# Patient Record
Sex: FEMALE | Race: BLACK OR AFRICAN AMERICAN | NOT HISPANIC OR LATINO | Employment: OTHER | URBAN - METROPOLITAN AREA
[De-identification: names, ages, dates, MRNs, and addresses within clinical notes are randomized per-mention and may not be internally consistent; named-entity substitution may affect disease eponyms.]

---

## 2018-11-27 ENCOUNTER — HOSPITAL ENCOUNTER (EMERGENCY)
Facility: HOSPITAL | Age: 33
Discharge: HOME | End: 2018-11-27
Attending: EMERGENCY MEDICINE | Admitting: EMERGENCY MEDICINE
Payer: MEDICARE

## 2018-11-27 VITALS
SYSTOLIC BLOOD PRESSURE: 124 MMHG | HEART RATE: 74 BPM | DIASTOLIC BLOOD PRESSURE: 79 MMHG | OXYGEN SATURATION: 98 % | TEMPERATURE: 98.1 F | RESPIRATION RATE: 16 BRPM

## 2018-11-27 DIAGNOSIS — M54.50 ACUTE LOW BACK PAIN: Primary | ICD-10-CM

## 2018-11-27 LAB
ALBUMIN SERPL BCP-MCNC: 3.1 G/DL (ref 3.5–5)
ALP SERPL-CCNC: 103 U/L (ref 46–116)
ALT SERPL W P-5'-P-CCNC: 39 U/L (ref 12–78)
ANION GAP SERPL CALCULATED.3IONS-SCNC: 6 MMOL/L (ref 4–13)
AST SERPL W P-5'-P-CCNC: 14 U/L (ref 5–45)
BACTERIA UR QL AUTO: ABNORMAL /HPF
BASOPHILS # BLD AUTO: 0.02 THOUSANDS/ΜL (ref 0–0.1)
BASOPHILS NFR BLD AUTO: 0 % (ref 0–1)
BILIRUB SERPL-MCNC: 0.4 MG/DL (ref 0.2–1)
BILIRUB UR QL STRIP: NEGATIVE
BUN SERPL-MCNC: 11 MG/DL (ref 5–25)
CALCIUM SERPL-MCNC: 8.5 MG/DL (ref 8.3–10.1)
CHLORIDE SERPL-SCNC: 102 MMOL/L (ref 100–108)
CLARITY UR: CLEAR
CO2 SERPL-SCNC: 30 MMOL/L (ref 21–32)
COLOR UR: YELLOW
CREAT SERPL-MCNC: 0.61 MG/DL (ref 0.6–1.3)
EOSINOPHIL # BLD AUTO: 0.12 THOUSAND/ΜL (ref 0–0.61)
EOSINOPHIL NFR BLD AUTO: 2 % (ref 0–6)
ERYTHROCYTE [DISTWIDTH] IN BLOOD BY AUTOMATED COUNT: 13.8 % (ref 11.6–15.1)
GFR SERPL CREATININE-BSD FRML MDRD: 140 ML/MIN/1.73SQ M
GLUCOSE SERPL-MCNC: 264 MG/DL (ref 65–140)
GLUCOSE UR STRIP-MCNC: ABNORMAL MG/DL
HCT VFR BLD AUTO: 38.8 % (ref 34.8–46.1)
HGB BLD-MCNC: 13.9 G/DL (ref 11.5–15.4)
HGB UR QL STRIP.AUTO: NEGATIVE
IMM GRANULOCYTES # BLD AUTO: 0.04 THOUSAND/UL (ref 0–0.2)
IMM GRANULOCYTES NFR BLD AUTO: 1 % (ref 0–2)
KETONES UR STRIP-MCNC: NEGATIVE MG/DL
LEUKOCYTE ESTERASE UR QL STRIP: NEGATIVE
LIPASE SERPL-CCNC: 79 U/L (ref 73–393)
LYMPHOCYTES # BLD AUTO: 1.98 THOUSANDS/ΜL (ref 0.6–4.47)
LYMPHOCYTES NFR BLD AUTO: 29 % (ref 14–44)
MCH RBC QN AUTO: 27.5 PG (ref 26.8–34.3)
MCHC RBC AUTO-ENTMCNC: 35.8 G/DL (ref 31.4–37.4)
MCV RBC AUTO: 77 FL (ref 82–98)
MONOCYTES # BLD AUTO: 0.45 THOUSAND/ΜL (ref 0.17–1.22)
MONOCYTES NFR BLD AUTO: 7 % (ref 4–12)
NEUTROPHILS # BLD AUTO: 4.2 THOUSANDS/ΜL (ref 1.85–7.62)
NEUTS SEG NFR BLD AUTO: 61 % (ref 43–75)
NITRITE UR QL STRIP: NEGATIVE
NON-SQ EPI CELLS URNS QL MICRO: ABNORMAL /HPF
NRBC BLD AUTO-RTO: 0 /100 WBCS
PH UR STRIP.AUTO: 7.5 [PH] (ref 4.5–8)
PLATELET # BLD AUTO: 283 THOUSANDS/UL (ref 149–390)
PMV BLD AUTO: 10.4 FL (ref 8.9–12.7)
POTASSIUM SERPL-SCNC: 3.8 MMOL/L (ref 3.5–5.3)
PROT SERPL-MCNC: 6.9 G/DL (ref 6.4–8.2)
PROT UR STRIP-MCNC: ABNORMAL MG/DL
RBC # BLD AUTO: 5.05 MILLION/UL (ref 3.81–5.12)
RBC #/AREA URNS AUTO: ABNORMAL /HPF
SODIUM SERPL-SCNC: 138 MMOL/L (ref 136–145)
SP GR UR STRIP.AUTO: 1.02 (ref 1–1.03)
UROBILINOGEN UR QL STRIP.AUTO: 2 E.U./DL
WBC # BLD AUTO: 6.81 THOUSAND/UL (ref 4.31–10.16)
WBC #/AREA URNS AUTO: ABNORMAL /HPF

## 2018-11-27 PROCEDURE — 36415 COLL VENOUS BLD VENIPUNCTURE: CPT | Performed by: PHYSICIAN ASSISTANT

## 2018-11-27 PROCEDURE — 85025 COMPLETE CBC W/AUTO DIFF WBC: CPT | Performed by: PHYSICIAN ASSISTANT

## 2018-11-27 PROCEDURE — 81025 URINE PREGNANCY TEST: CPT | Performed by: PHYSICIAN ASSISTANT

## 2018-11-27 PROCEDURE — 81001 URINALYSIS AUTO W/SCOPE: CPT

## 2018-11-27 PROCEDURE — 96374 THER/PROPH/DIAG INJ IV PUSH: CPT

## 2018-11-27 PROCEDURE — 80053 COMPREHEN METABOLIC PANEL: CPT | Performed by: PHYSICIAN ASSISTANT

## 2018-11-27 PROCEDURE — 83690 ASSAY OF LIPASE: CPT | Performed by: PHYSICIAN ASSISTANT

## 2018-11-27 PROCEDURE — 99283 EMERGENCY DEPT VISIT LOW MDM: CPT

## 2018-11-27 PROCEDURE — 96375 TX/PRO/DX INJ NEW DRUG ADDON: CPT

## 2018-11-27 RX ORDER — KETOROLAC TROMETHAMINE 30 MG/ML
15 INJECTION, SOLUTION INTRAMUSCULAR; INTRAVENOUS ONCE
Status: COMPLETED | OUTPATIENT
Start: 2018-11-27 | End: 2018-11-27

## 2018-11-27 RX ORDER — TIZANIDINE HYDROCHLORIDE 2 MG/1
2 CAPSULE, GELATIN COATED ORAL 3 TIMES DAILY
Qty: 30 CAPSULE | Refills: 0 | Status: SHIPPED | OUTPATIENT
Start: 2018-11-27 | End: 2019-05-15

## 2018-11-27 RX ORDER — NAPROXEN 500 MG/1
500 TABLET ORAL 2 TIMES DAILY WITH MEALS
Qty: 60 TABLET | Refills: 0 | Status: SHIPPED | OUTPATIENT
Start: 2018-11-27 | End: 2018-11-27

## 2018-11-27 RX ORDER — KETOROLAC TROMETHAMINE 10 MG/1
10 TABLET, FILM COATED ORAL EVERY 6 HOURS PRN
Qty: 20 TABLET | Refills: 0 | Status: SHIPPED | OUTPATIENT
Start: 2018-11-27 | End: 2020-08-11 | Stop reason: ALTCHOICE

## 2018-11-27 RX ORDER — LIDOCAINE 50 MG/G
1 PATCH TOPICAL DAILY
Qty: 30 PATCH | Refills: 0 | Status: SHIPPED | OUTPATIENT
Start: 2018-11-27 | End: 2019-05-15

## 2018-11-27 RX ORDER — DIAZEPAM 5 MG/ML
2.5 INJECTION, SOLUTION INTRAMUSCULAR; INTRAVENOUS ONCE
Status: COMPLETED | OUTPATIENT
Start: 2018-11-27 | End: 2018-11-27

## 2018-11-27 RX ORDER — LIDOCAINE 50 MG/G
1 PATCH TOPICAL ONCE
Status: DISCONTINUED | OUTPATIENT
Start: 2018-11-27 | End: 2018-11-27 | Stop reason: HOSPADM

## 2018-11-27 RX ADMIN — LIDOCAINE 1 PATCH: 50 PATCH TOPICAL at 09:38

## 2018-11-27 RX ADMIN — Medication 2.5 MG: at 10:35

## 2018-11-27 RX ADMIN — KETOROLAC TROMETHAMINE 15 MG: 30 INJECTION, SOLUTION INTRAMUSCULAR at 09:38

## 2018-11-27 NOTE — ED PROVIDER NOTES
History  Chief Complaint   Patient presents with    Back Pain     severe lower back pain with urgency  frequency and burning on urination     Patient presents emergency department with low back pain this been getting worse over the past 4 days however she later admits that she has had had this pain off and on for months  She has been seeing [facility]  She is scheduled for an MRI in a couple of days for her L spine  Patient states she just can't stand the pain anymore she has been on various NSAIDs and muscle relaxants and is just not getting relief  Patient is tearful with symptoms  She also reports urinary frequency and urgency  She states that her urinary symptoms are not new and have been present ever since she became a diabetic but has been worse the past couple of days  Patient has had normal bowel movements  No radicular symptoms  She reports some mild abdominal pain today  She has been able to eat and drink  She ate normally last night  There has been no fevers or chills  No injury or trauma  None       Past Medical History:   Diagnosis Date    Asthma     Bipolar 1 disorder ([facility] [address] )     [facility]ch[facility] obstructive [facility]     Depression     Diabetes mellitus ([facility])     Hypertension     Psychiatric disorder     PTSD (post-traumatic stress disorder)     Tendonitis     L shoulder       History reviewed  No pertinent surgical history  History reviewed  No pertinent family history  I have reviewed and agree with the history as documented  Social History   Substance Use Topics    Smoking status: Current Every Day Smoker    Smokeless tobacco: Never Used    Alcohol use No        Review of Systems   All other systems reviewed and are negative  Physical Exam  Physical Exam   Constitutional: She appears well-developed and well-nourished  HENT:   Head: Normocephalic and atraumatic     Right Ear: External ear normal    Left Ear: External ear normal  Mouth/Throat: Oropharynx is clear and moist    Eyes: Conjunctivae and EOM are normal    Neck: Neck supple  Cardiovascular: Normal rate, regular rhythm, normal heart sounds and intact distal pulses  Pulmonary/Chest: Effort normal and breath sounds normal    Abdominal: Soft  Bowel sounds are normal    Mild tenderness in suprapubic area  No CVA tenderness bilaterally   Musculoskeletal:        Lumbar back: She exhibits tenderness, bony tenderness, pain and spasm  She exhibits normal pulse  Back:    Neurological: She is alert  Skin: Skin is warm  Psychiatric: She has a normal mood and affect  Her behavior is normal    Nursing note and vitals reviewed        Vital Signs  ED Triage Vitals [11/27/18 0841]   Temperature Pulse Respirations Blood Pressure SpO2   98 1 °F (36 7 °C) 84 16 151/91 100 %      Temp Source Heart Rate Source Patient Position - Orthostatic VS BP Location FiO2 (%)   Oral Monitor Lying Right arm --      Pain Score       Worst Possible Pain           Vitals:    11/27/18 0841 11/27/18 1038   BP: 151/91 124/79   Pulse: 84 74   Patient Position - Orthostatic VS: Lying Sitting       Visual Acuity      ED Medications  Medications   ketorolac (TORADOL) injection 15 mg (15 mg Intravenous Given 11/27/18 0938)   diazepam (VALIUM) injection 2 5 mg (2 5 mg Intravenous Given 11/27/18 1035)       Diagnostic Studies  Results Reviewed     Procedure Component Value Units Date/Time    Comprehensive metabolic panel [[id number]]  (Abnormal) Collected:  11/27/18 0937    Lab Status:  Final result Specimen:  Blood from Arm, Right Updated:  11/27/18 1011     Sodium 138 mmol/L      Potassium 3 8 mmol/L      Chloride 102 mmol/L      CO2 30 mmol/L      ANION GAP 6 mmol/L      BUN 11 mg/dL      Creatinine 0 61 mg/dL      Glucose 264 (H) mg/dL      Calcium 8 5 mg/dL      AST 14 U/L      ALT 39 U/L      Alkaline Phosphatase 103 U/L      Total Protein 6 9 g/dL      Albumin 3 1 (L) g/dL      Total Bilirubin 0 40 mg/dL eGFR 140 ml/min/1 73sq m     Narrative:         National Kidney Disease Education Program recommendations are as follows:  GFR calculation is accurate only with a steady state creatinine  Chronic Kidney disease less than 60 ml/min/1 73 sq  meters  Kidney failure less than 15 ml/min/1 73 sq  meters      Lipase [[id number]]  (Normal) Collected:  11/27/18 0937    Lab Status:  Final result Specimen:  Blood from Arm, Right Updated:  11/27/18 1011     Lipase 79 u/L     Urine Microscopic [[id number]]  (Abnormal) Collected:  11/27/18 0922    Lab Status:  Final result Specimen:  Urine from Urine, Clean Catch Updated:  11/27/18 0958     RBC, UA None Seen /hpf      WBC, UA 2-4 (A) /hpf      Epithelial Cells Occasional /hpf      Bacteria, UA Occasional /hpf     CBC and differential [[id number]]  (Abnormal) Collected:  11/27/18 0937    Lab Status:  Final result Specimen:  Blood from Arm, Right Updated:  11/27/18 0948     WBC 6 81 Thousand/uL      RBC 5 05 Million/uL      Hemoglobin 13 9 g/dL      Hematocrit 38 8 %      MCV 77 (L) fL      MCH 27 5 pg      MCHC 35 8 g/dL      RDW 13 8 %      MPV 10 4 fL      Platelets 985 Thousands/uL      nRBC 0 /100 WBCs      Neutrophils Relative 61 %      Immat GRANS % 1 %      Lymphocytes Relative 29 %      Monocytes Relative 7 %      Eosinophils Relative 2 %      Basophils Relative 0 %      Neutrophils Absolute 4 20 Thousands/µL      Immature Grans Absolute 0 04 Thousand/uL      Lymphocytes Absolute 1 98 Thousands/µL      Monocytes Absolute 0 45 Thousand/µL      Eosinophils Absolute 0 12 Thousand/µL      Basophils Absolute 0 02 Thousands/µL     POCT pregnancy, urine [[id number]]  (Normal) Resulted:  11/27/18 0938    Lab Status:  Final result Updated:  11/27/18 0938     EXT PREG TEST UR (Ref: Negative) --    ED Urine Macroscopic [[id number]]  (Abnormal) Collected:  11/27/18 0922    Lab Status:  Final result Specimen:  Urine Updated:  11/27/18 0925     Color, UA Yellow     Clarity, UA Clear pH, UA 7 5     Leukocytes, UA Negative     Nitrite, UA Negative     Protein, UA Trace (A) mg/dl      Glucose,  (1/2%) (A) mg/dl      Ketones, UA Negative mg/dl      Urobilinogen, UA 2 0 (A) E U /dl      Bilirubin, UA Negative     Blood, UA Negative     Specific Gravity, UA 1 020    Narrative:       CLINITEK RESULT                 No orders to display              Procedures  Procedures       Phone Contacts  ED Phone Contact    ED Course                               MDM  Number of Diagnoses or Management Options  Acute low back pain: new and requires workup  Diagnosis management comments: Will check a urine concern for possible UTI or stone however patient has no flank pain all of her back pain is located lower back  There is no evidence of UTI and no blood in the urine doubt stone and she has no CVA tenderness and there is no blood in the urine  Patient had some mild lower abdominal-suprapubic tenderness initially but this resolved  Suspect patient's urinary symptoms is secondary to her diabetes and spilling glucose into her urine  Discussed this with patient discussed importance of close follow up with her family doctor for better control of her diabetes and she understands  Patient has been on prednisone within the past month and with her diabetes I do not feel a repeat course of this would be beneficial   She understands this  Patient is scheduled to have an MRI done through [facility]  later this week and so no additional imaging here is felt to be warranted  Patient understands this as well  Patient appears to be resting comfortably in bed however when I go when she then can become tearful complaining of the pain  Amount and/or Complexity of Data Reviewed  Clinical lab tests: reviewed    Risk of Complications, Morbidity, and/or Mortality  General comments:  Instructions reviewed with patient discussed importance of following up with her family doctor as well as with her doctors /comprehensive pain management/[facility]  Instructions reviewed with patient  She is requesting oral Toradol stating that she feels that works better than Naprosyn or ibuprofen  Discussed increased risk of using this orally she accepts these risks and is still requesting the medication  Patient Progress  Patient progress: improved    CritCare Time    Disposition  Final diagnoses:   Acute low back pain     Time reflects when diagnosis was documented in both MDM as applicable and the Disposition within this note     Time User Action Codes Description Comment    11/27/2018 10:44 AM Rosalina Wagner [M54 5] Acute low back pain       ED Disposition     ED Disposition Condition Comment    Discharge  [address] discharge to home/self care  Condition at discharge: Good        Follow-up Information     Follow up With Specialties Details Why Contact Info Additional Information    [facility] Physical Therapy   [address] [place] [id number]  [address] [place] [address] [place], [place], [phone]          Discharge Medication List as of 11/27/2018 10:50 AM      START taking these medications    Details   lidocaine (LIDODERM) 5 % Apply 1 patch topically daily Remove & Discard patch within 12 hours or as directed by MD, Starting Tue 11/27/2018, Print      TiZANidine (ZANAFLEX) 2 MG capsule Take 1 capsule (2 mg total) by mouth 3 (three) times a day After 2 days may Increase to 2 tabs if needed, Starting Tue 11/27/2018, Print      naproxen (EC NAPROSYN) 500 MG EC tablet Take 1 tablet (500 mg total) by mouth 2 (two) times a day with meals, Starting Tue 11/27/2018, Until Wed 11/27/2019, Print           No discharge procedures on file      ED Provider  Electronically Signed by           Keely Edwards PA-C  11/27/18 6282

## 2018-11-27 NOTE — DISCHARGE INSTRUCTIONS
Warm compresses to the area  Medication as directed  FU with your family doctor, return to the ED for worsening symptoms  Acute Low Back Pain, Ambulatory Care   GENERAL INFORMATION:   Acute low back pain  is discomfort in your lower back area that lasts for less than 12 weeks  The word acute is used to describe pain that starts suddenly, worsens quickly, and lasts for a short time  Common symptoms include the following:   · Back stiffness or spasms    · Pain down the back or side of one leg    · Holding yourself in an unusual position or posture to decrease your back pain    · Not being able to find a sitting position that is comfortable    · Slow increase in your pain for 24 to 48 hours after you stress your back    · Tenderness on your lower back or severe pain when you move your back  Seek immediate care for the following symptoms:   · Severe pain    · Sudden stiffness and heaviness in both buttocks down to both legs    · Numbness or weakness in one leg, or pain in both legs    · Numbness in your genital area or across your lower back    · Unable to control your urine or bowel movements  Treatment for acute low back pain  may include any of the following:  · Medicines:      ¨ NSAIDs  help decrease swelling and pain or fever  This medicine is available with or without a doctor's order  NSAIDs can cause stomach bleeding or kidney problems in certain people  If you take blood thinner medicine, always ask your healthcare provider if NSAIDs are safe for you  Always read the medicine label and follow directions  ¨ Muscle relaxers  help decrease muscle spasms pain  ¨ Prescription pain medicine  may be given  Ask how to take this medicine safely  · Surgery  may be needed if your pain is severe and other treatments do not work  Surgery may be needed for conditions of the lumbar spine, such as herniated disc or spinal stenosis  Manage your symptoms:   · Sleep on a firm mattress    If you do not have a firm mattress, have someone move your mattress to the floor for a few days  A piece of plywood under your mattress can also help make it firmer  · Apply ice  on your lower back for 15 to 20 minutes every hour or as directed  Use an ice pack, or put crushed ice in a plastic bag  Cover it with a towel  Ice helps prevent tissue damage and decreases swelling and pain  You can alternate ice and heat  · Apply heat  on your lower back for 20 to 30 minutes every 2 hours for as many days as directed  Heat helps decrease pain and muscle spasms  · Go to physical therapy  A physical therapist teaches you exercises to help improve movement and strength, and to decrease pain  Prevent acute low back pain:   · Use proper body mechanics  ¨ Bend at the hips and knees when you  objects  Do not bend from the waist  Use your leg muscles as you lift the load  Do not use your back  Keep the object close to your chest as you lift it  Try not to twist or lift anything above your waist     ¨ Change your position often when you stand for long periods of time  Rest one foot on a small box or footrest, and then switch to the other foot often  ¨ Try not to sit for long periods of time  When you do, sit in a straight-backed chair with your feet flat on the floor  Never reach, pull, or push while you are sitting  · Exercise regularly  Warm up before you exercise  Do exercises that strengthen your back muscles  Ask about the best exercise plan for you  · Maintain a healthy weight  Ask your healthcare provider how much you should weigh  Ask him to help you create a weight loss plan if you are overweight  Follow up with your healthcare provider as directed:  Return for a follow-up visit if you still have pain after 1 to 3 weeks of treatment  You may need to visit an orthopedist if your back pain lasts more than 6 to 12 weeks  Write down your questions so you remember to ask them during your visits    CARE AGREEMENT:   You have the right to help plan your care  Learn about your health condition and how it may be treated  Discuss treatment options with your caregivers to decide what care you want to receive  You always have the right to refuse treatment  The above information is an  only  It is not intended as medical advice for individual conditions or treatments  Talk to your doctor, nurse or pharmacist before following any medical regimen to see if it is safe and effective for you  © 2014 [address] is for End User's use only and may not be sold, redistributed or otherwise used for commercial purposes  All illustrations and images included in CareNotes® are the copyrighted property of [employer]  or [employer]

## 2018-11-28 ENCOUNTER — HOSPITAL ENCOUNTER (EMERGENCY)
Facility: HOSPITAL | Age: 33
Discharge: HOME | End: 2018-11-28
Attending: EMERGENCY MEDICINE | Admitting: EMERGENCY MEDICINE
Payer: MEDICARE

## 2018-11-28 ENCOUNTER — APPOINTMENT (EMERGENCY)
Dept: CT IMAGING | Facility: HOSPITAL | Age: 33
End: 2018-11-28
Payer: MEDICARE

## 2018-11-28 VITALS
SYSTOLIC BLOOD PRESSURE: 164 MMHG | RESPIRATION RATE: 18 BRPM | OXYGEN SATURATION: 98 % | HEART RATE: 76 BPM | TEMPERATURE: 97.9 F | DIASTOLIC BLOOD PRESSURE: 102 MMHG | WEIGHT: 260.14 LBS

## 2018-11-28 DIAGNOSIS — R10.9 ABDOMINAL PAIN: ICD-10-CM

## 2018-11-28 DIAGNOSIS — M54.16 LUMBAR RADICULOPATHY: Primary | ICD-10-CM

## 2018-11-28 LAB — GLUCOSE SERPL-MCNC: 206 MG/DL (ref 65–140)

## 2018-11-28 PROCEDURE — 96374 THER/PROPH/DIAG INJ IV PUSH: CPT

## 2018-11-28 PROCEDURE — 82948 REAGENT STRIP/BLOOD GLUCOSE: CPT

## 2018-11-28 PROCEDURE — 96375 TX/PRO/DX INJ NEW DRUG ADDON: CPT

## 2018-11-28 PROCEDURE — 74177 CT ABD & PELVIS W/CONTRAST: CPT

## 2018-11-28 PROCEDURE — 99285 EMERGENCY DEPT VISIT HI MDM: CPT

## 2018-11-28 RX ORDER — LIDOCAINE 50 MG/G
1 PATCH TOPICAL ONCE
Status: DISCONTINUED | OUTPATIENT
Start: 2018-11-28 | End: 2018-11-29 | Stop reason: HOSPADM

## 2018-11-28 RX ORDER — HYDROMORPHONE HCL/PF 1 MG/ML
1 SYRINGE (ML) INJECTION ONCE
Status: COMPLETED | OUTPATIENT
Start: 2018-11-28 | End: 2018-11-28

## 2018-11-28 RX ORDER — ONDANSETRON 2 MG/ML
4 INJECTION INTRAMUSCULAR; INTRAVENOUS ONCE
Status: COMPLETED | OUTPATIENT
Start: 2018-11-28 | End: 2018-11-28

## 2018-11-28 RX ADMIN — LIDOCAINE 1 PATCH: 50 PATCH TOPICAL at 23:35

## 2018-11-28 RX ADMIN — HYDROMORPHONE HYDROCHLORIDE 1 MG: 1 INJECTION, SOLUTION INTRAMUSCULAR; INTRAVENOUS; SUBCUTANEOUS at 22:40

## 2018-11-28 RX ADMIN — ONDANSETRON 4 MG: 2 INJECTION INTRAMUSCULAR; INTRAVENOUS at 22:40

## 2018-11-28 RX ADMIN — IOHEXOL 100 ML: 350 INJECTION, SOLUTION INTRAVENOUS at 22:20

## 2018-11-29 NOTE — DISCHARGE INSTRUCTIONS
Lumbar Radiculopathy   WHAT YOU NEED TO KNOW:   Lumbar radiculopathy is a painful condition that happens when a nerve in your lumbar spine (lower back) is pinched or irritated  Nerves control feeling and movement in your body  You may have numbness or pain that shoots down from your lower back towards your foot  DISCHARGE INSTRUCTIONS:   Medicines:   · Medicines:     ¨ NSAIDs , such as ibuprofen, help decrease swelling, pain, and fever  This medicine is available with or without a doctor's order  NSAIDs can cause stomach bleeding or kidney problems in certain people  If you take blood thinner medicine, always ask your healthcare provider if NSAIDs are safe for you  Always read the medicine label and follow directions  ¨ Muscle relaxers  help decrease pain and muscle spasms  ¨ Opioids: This is a strong medicine given to reduce severe pain  It is also called narcotic pain medicine  Take this medicine exactly as directed by your healthcare provider  ¨ Oral steroids: Steroids may also be given to reduce pain and swelling  ¨ Take your medicine as directed  Contact your healthcare provider if you think your medicine is not helping or if you have side effects  Tell him of her if you are allergic to any medicine  Keep a list of the medicines, vitamins, and herbs you take  Include the amounts, and when and why you take them  Bring the list or the pill bottles to follow-up visits  Carry your medicine list with you in case of an emergency  Follow up with your healthcare provider or spine specialist within 1 to 3 weeks:  After your first follow-up appointment, return to your healthcare provider or spine specialist every 2 weeks until you have healed  Ask for information about physical therapy for your condition  Write down your questions so you remember to ask them during your visits  Physical therapy:  You may need physical therapy to improve your condition   Your physical therapist may teach you certain exercises to improve posture (the way you stand and sit), flexibility, and strength in your lower back  Self care:   · Stay active: It is best to be active when you have lumbar radiculopathy  Your physical therapist or healthcare provider may tell you to take walks to ease yourself back into your daily routine  Avoid long periods of bed rest  Bed rest could worsen your symptoms  Do not move in ways that increase your pain  Ask for more information about the best ways to stay active  · Use ice or heat packs:  Use ice or heat packs as directed on the sore area of your body to decrease the pain and swelling  Put ice in a plastic bag covered with a towel on your low back  Cover heated items with a towel to avoid burns  Use ice and heat as directed  · Avoid heavy lifting: Your condition may worsen if you lift heavy things  Avoid lifting if possible  · Maintain a healthy weight:  Excess body weight may strain your back  Talk with your healthcare provider about ways to lose excess weight if you are overweight  Contact your healthcare provider or spine specialist if:   · Your pain does not improve within 1 to 3 weeks after treatment  · Your pain and weakness keep you from your normal activities at work, home, or school  · You lose more than 10 pounds in 6 months without trying  · You become depressed or sad because of the pain  · You have questions or concerns about your condition or care  Return to the emergency department if:   · You have a fever greater than 100 4°F for longer than 2 days  · You have new, severe back or leg pain, or your pain spreads to both legs  · You have any new signs of numbness or weakness, especially in your lower back, legs, arms, or genital area  · You have new trouble controlling your urine and bowel movements  · You do not feel like your bladder empties when you urinate    © 2017 [address] Information is for End User's use only and may not be sold, redistributed or otherwise used for commercial purposes  All illustrations and images included in CareNotes® are the copyrighted property of [employer]  or [employer]  The above information is an  only  It is not intended as medical advice for individual conditions or treatments  Talk to your doctor, nurse or pharmacist before following any medical regimen to see if it is safe and effective for you  Abdominal Pain   WHAT YOU NEED TO KNOW:   Abdominal pain can be dull, achy, or sharp  You may have pain in one area of your abdomen, or in your entire abdomen  Your pain may be caused by a condition such as constipation, food sensitivity or poisoning, infection, or a blockage  Abdominal pain can also be from a hernia, appendicitis, or an ulcer  Liver, gallbladder, or kidney conditions can also cause abdominal pain  The cause of your abdominal pain may be unknown  DISCHARGE INSTRUCTIONS:   Return to the emergency department if:   · You have new chest pain or shortness of breath  · You have pulsing pain in your upper abdomen or lower back that suddenly becomes constant  · Your pain is in the right lower abdominal area and worsens with movement  · You have a fever over 100 4°F (38°C) or shaking chills  · You are vomiting and cannot keep food or liquids down  · Your pain does not improve or gets worse over the next 8 to 12 hours  · You see blood in your vomit or bowel movements, or they look black and tarry  · Your skin or the whites of your eyes turn yellow  · You are a woman and have a large amount of vaginal bleeding that is not your monthly period  Contact your healthcare provider if:   · You have pain in your lower back  · You are a man and have pain in your testicles  · You have pain when you urinate  · You have questions or concerns about your condition or care    Follow up with your healthcare provider within 24 hours or as directed:  Write down your questions so you remember to ask them during your visits  Medicines:   · Medicines  may be given to calm your stomach and prevent vomiting or to decrease pain  Ask how to take pain medicine safely  · Take your medicine as directed  Contact your healthcare provider if you think your medicine is not helping or if you have side effects  Tell him of her if you are allergic to any medicine  Keep a list of the medicines, vitamins, and herbs you take  Include the amounts, and when and why you take them  Bring the list or the pill bottles to follow-up visits  Carry your medicine list with you in case of an emergency  © 2017 [address] Information is for End User's use only and may not be sold, redistributed or otherwise used for commercial purposes  All illustrations and images included in CareNotes® are the copyrighted property of [employer]  or [employer] Frost  The above information is an  only  It is not intended as medical advice for individual conditions or treatments  Talk to your doctor, nurse or pharmacist before following any medical regimen to see if it is safe and effective for you

## 2018-11-29 NOTE — ED PROVIDER NOTES
History  Chief Complaint   Patient presents with    Back Pain     patient comes in with lower back pain that started about 2 weeks ago  Patient was seen here yesterday morning and given meds but her insurance wont cover the prescription  Patient's pain not getting any better with time and states the pain is radiating down the right leg,   Black or Bloody Stool     patient also states she had 1 episode of bloody stool this evening after a bowel movement  When she wiped there was blood on the toilet paper  Denies any diarrhea  States she has a hx of hemorrhoids      26yo female who presents to ER for evaluation of low back pain  Worse on the right  Onset about 2 weeks ago  Denies injury  She was seen and evaluated here last night for this  Since then symptoms have a increased and began to radiate into the right buttock and right posterior thigh  Patient describes a tight tingling sensation in this area  She is able to walk with pain  She also describes some discomfort in her abdomen described as a knot  Today she had 1 episode of normal stool that was bloody and painful  Patient states she has had a hemorrhoid in the past but does not currently have 1 that she knows of  Denies recent bouts of diarrhea or constipation  She states it she urinates frequently however does have diabetes and this is likely related to uncontrolled sugars as it is not new  She denies fever  Denies rash  Denies recreational intravenous drug abuse  States she is not sexually active  History provided by:  Patient  Back Pain   Associated symptoms: no chest pain, no dysuria, no fever and no pelvic pain    Black or Bloody Stool   Associated symptoms: no fever and no vomiting        Prior to Admission Medications   Prescriptions Last Dose Informant Patient Reported? Taking?    TiZANidine (ZANAFLEX) 2 MG capsule   No No   Sig: Take 1 capsule (2 mg total) by mouth 3 (three) times a day After 2 days may Increase to 2 tabs if needed   ketorolac (TORADOL) 10 mg tablet   No No   Sig: Take 1 tablet (10 mg total) by mouth every 6 (six) hours as needed for moderate pain   lidocaine (LIDODERM) 5 %   No No   Sig: Apply 1 patch topically daily Remove & Discard patch within 12 hours or as directed by MD      Facility-Administered Medications: None       Past Medical History:   Diagnosis Date    Asthma     Bipolar 1 disorder ([place] [address] )     COPD (chronic obstructive pulmonary disease) ([place] [address] )     Depression     Diabetes mellitus ([place] [address] )     Hypertension     Psychiatric disorder     PTSD (post-traumatic stress disorder)     Tendonitis     L shoulder       History reviewed  No pertinent surgical history  History reviewed  No pertinent family history  I have reviewed and agree with the history as documented  Social History   Substance Use Topics    Smoking status: Current Every Day Smoker    Smokeless tobacco: Never Used    Alcohol use No        Review of Systems   Constitutional: Negative for chills and fever  Respiratory: Negative for shortness of breath  Cardiovascular: Negative for chest pain  Gastrointestinal: Positive for blood in stool  Negative for vomiting  Genitourinary: Positive for frequency  Negative for dysuria and pelvic pain  Musculoskeletal: Positive for back pain  Negative for neck pain  Skin: Negative for rash  Physical Exam  Physical Exam   Constitutional: She appears well-developed and well-nourished  HENT:   Right Ear: External ear normal    Left Ear: External ear normal    Eyes: Conjunctivae are normal    Neck: Neck supple  Cardiovascular: Normal rate, regular rhythm and normal heart sounds  Pulmonary/Chest: Effort normal and breath sounds normal    Abdominal: Soft  Bowel sounds are normal  She exhibits no distension and no mass  There is tenderness (lower abdomen)  There is no rebound, no guarding and no CVA tenderness  No hernia     Genitourinary: Rectal exam shows anal tone normal and guaiac negative stool  Musculoskeletal:        Lumbar back: She exhibits decreased range of motion, tenderness and bony tenderness  She exhibits no swelling, no edema and no deformity  Back:    Negative straight leg raise  No foot drop  Able to walk   Neurological: She is alert  Skin: Skin is warm and dry  No rash noted  Psychiatric: She has a normal mood and affect  Nursing note and vitals reviewed  Vital Signs  ED Triage Vitals   Temperature Pulse Respirations Blood Pressure SpO2   11/28/18 2124 11/28/18 2121 11/28/18 2121 11/28/18 2121 11/28/18 2121   97 9 °F (36 6 °C) 83 20 (!) 175/107 98 %      Temp Source Heart Rate Source Patient Position - Orthostatic VS BP Location FiO2 (%)   11/28/18 2121 11/28/18 2245 11/28/18 2121 11/28/18 2121 --   Oral Monitor Lying Right arm       Pain Score       11/28/18 2121       Worst Possible Pain           Vitals:    11/28/18 2121 11/28/18 2245 11/28/18 2300   BP: (!) 175/107 (!) 171/101 (!) 164/102   Pulse: 83 88 76   Patient Position - Orthostatic VS: Lying Lying Lying       Visual Acuity      ED Medications  Medications   lidocaine (LIDODERM) 5 % patch 1 patch (not administered)   HYDROmorphone (DILAUDID) injection 1 mg (1 mg Intravenous Given 11/28/18 2240)   ondansetron (ZOFRAN) injection 4 mg (4 mg Intravenous Given 11/28/18 2240)   iohexol (OMNIPAQUE) 350 MG/ML injection (MULTI-DOSE) 100 mL (100 mL Intravenous Given 11/28/18 2220)       Diagnostic Studies  Results Reviewed     Procedure Component Value Units Date/Time    Fingerstick Glucose (POCT) [[id number]]  (Abnormal) Collected:  11/28/18 2316    Lab Status:  Final result Updated:  11/28/18 2317     POC Glucose 206 (H) mg/dl                  CT abdomen pelvis with contrast   Final Result by Wenceslao Garcia MD (11/28 2308)      No acute inflammatory process identified within the abdomen or pelvis              Workstation performed: [id number]         CT recon only lumbar spine   Final Result by Truett Burkitt Harpal Miller MD (11/28 2308)      No fracture or traumatic subluxation  No significant degenerative changes  Workstation performed: [id number]                    Procedures  Procedures       Phone Contacts  ED Phone Contact    ED Course                               MDM  Number of Diagnoses or Management Options  Abdominal pain:   Lumbar radiculopathy:      Amount and/or Complexity of Data Reviewed  Clinical lab tests: ordered and reviewed  Tests in the radiology section of CPT®: ordered and reviewed  Decide to obtain previous medical records or to obtain history from someone other than the patient: yes (Labs and urine from yesterday consistent with hyperglycemia  Patient has lumbar MRI scheduled for tomorrow )    Risk of Complications, Morbidity, and/or Mortality  General comments: Differential diagnosis includes but is not limited to: diverticulitis, hernia, muscle strain/spasm, sciatica, ddd, buldging disc, hyperglycemia, less likely uti/pyelo as urine was normal yesterday    Patient Progress  Patient progress: stable    CritCare Time    Disposition  Final diagnoses:   Lumbar radiculopathy   Abdominal pain     Time reflects when diagnosis was documented in both MDM as applicable and the Disposition within this note     Time User Action Codes Description Comment    11/28/2018 11:27 PM Negro Valiente Add [M54 16] Lumbar radiculopathy     11/28/2018 11:27 PM Negro Valiente Add [R10 9] Abdominal pain       ED Disposition     ED Disposition Condition Comment    Discharge  Lamount Cogan discharge to home/self care      Condition at discharge: Good        Follow-up Information     Follow up With Specialties Details Why Contact Info Additional [address] [facility], DO Family Medicine In 3 days  [address] [place] [address] [address] Emergency Department Emergency Medicine  If symptoms worsen [address]  [phone] [facility], [address], [place], [place], [address]          Patient's Medications   Discharge Prescriptions    No medications on file     No discharge procedures on file      ED Provider  Electronically Signed by           Phillip Lockett PA-C  11/28/18 0689

## 2019-01-23 ENCOUNTER — HOSPITAL ENCOUNTER (EMERGENCY)
Facility: HOSPITAL | Age: 34
Discharge: HOME | End: 2019-01-23
Attending: EMERGENCY MEDICINE | Admitting: EMERGENCY MEDICINE
Payer: MEDICARE

## 2019-01-23 ENCOUNTER — APPOINTMENT (EMERGENCY)
Dept: RADIOLOGY | Facility: HOSPITAL | Age: 34
End: 2019-01-23
Payer: MEDICARE

## 2019-01-23 VITALS
TEMPERATURE: 98 F | HEART RATE: 107 BPM | RESPIRATION RATE: 18 BRPM | OXYGEN SATURATION: 98 % | DIASTOLIC BLOOD PRESSURE: 87 MMHG | SYSTOLIC BLOOD PRESSURE: 180 MMHG

## 2019-01-23 DIAGNOSIS — R73.9 HYPERGLYCEMIA: ICD-10-CM

## 2019-01-23 DIAGNOSIS — G43.909 MIGRAINE: Primary | ICD-10-CM

## 2019-01-23 DIAGNOSIS — L02.214 ABSCESS OF GROIN, RIGHT: ICD-10-CM

## 2019-01-23 DIAGNOSIS — L03.116 CELLULITIS OF LEFT FOOT: ICD-10-CM

## 2019-01-23 LAB
ANION GAP SERPL CALCULATED.3IONS-SCNC: 7 MMOL/L (ref 4–13)
BUN SERPL-MCNC: 15 MG/DL (ref 5–25)
CALCIUM SERPL-MCNC: 9.1 MG/DL (ref 8.3–10.1)
CHLORIDE SERPL-SCNC: 100 MMOL/L (ref 100–108)
CO2 SERPL-SCNC: 30 MMOL/L (ref 21–32)
CREAT SERPL-MCNC: 0.65 MG/DL (ref 0.6–1.3)
GFR SERPL CREATININE-BSD FRML MDRD: 136 ML/MIN/1.73SQ M
GLUCOSE SERPL-MCNC: 246 MG/DL (ref 65–140)
POTASSIUM SERPL-SCNC: 4.3 MMOL/L (ref 3.5–5.3)
SODIUM SERPL-SCNC: 137 MMOL/L (ref 136–145)

## 2019-01-23 PROCEDURE — 96365 THER/PROPH/DIAG IV INF INIT: CPT

## 2019-01-23 PROCEDURE — 80048 BASIC METABOLIC PNL TOTAL CA: CPT | Performed by: PHYSICIAN ASSISTANT

## 2019-01-23 PROCEDURE — 99284 EMERGENCY DEPT VISIT MOD MDM: CPT

## 2019-01-23 PROCEDURE — 36415 COLL VENOUS BLD VENIPUNCTURE: CPT | Performed by: PHYSICIAN ASSISTANT

## 2019-01-23 PROCEDURE — 73630 X-RAY EXAM OF FOOT: CPT

## 2019-01-23 PROCEDURE — 96375 TX/PRO/DX INJ NEW DRUG ADDON: CPT

## 2019-01-23 RX ORDER — ACETAMINOPHEN 325 MG/1
650 TABLET ORAL ONCE
Status: DISCONTINUED | OUTPATIENT
Start: 2019-01-23 | End: 2019-01-23

## 2019-01-23 RX ORDER — SULFAMETHOXAZOLE AND TRIMETHOPRIM 800; 160 MG/1; MG/1
1 TABLET ORAL 2 TIMES DAILY
Qty: 20 TABLET | Refills: 0 | Status: SHIPPED | OUTPATIENT
Start: 2019-01-23 | End: 2019-02-02

## 2019-01-23 RX ORDER — LIDOCAINE 50 MG/G
1 PATCH TOPICAL ONCE
Status: DISCONTINUED | OUTPATIENT
Start: 2019-01-23 | End: 2019-01-23

## 2019-01-23 RX ORDER — CEPHALEXIN 500 MG/1
500 CAPSULE ORAL 3 TIMES DAILY
Qty: 30 CAPSULE | Refills: 0 | Status: SHIPPED | OUTPATIENT
Start: 2019-01-23 | End: 2019-02-02

## 2019-01-23 RX ORDER — MAGNESIUM SULFATE HEPTAHYDRATE 40 MG/ML
2 INJECTION, SOLUTION INTRAVENOUS ONCE
Status: COMPLETED | OUTPATIENT
Start: 2019-01-23 | End: 2019-01-23

## 2019-01-23 RX ORDER — METOCLOPRAMIDE HYDROCHLORIDE 5 MG/ML
10 INJECTION INTRAMUSCULAR; INTRAVENOUS ONCE
Status: COMPLETED | OUTPATIENT
Start: 2019-01-23 | End: 2019-01-23

## 2019-01-23 RX ORDER — KETOROLAC TROMETHAMINE 30 MG/ML
30 INJECTION, SOLUTION INTRAMUSCULAR; INTRAVENOUS ONCE
Status: COMPLETED | OUTPATIENT
Start: 2019-01-23 | End: 2019-01-23

## 2019-01-23 RX ORDER — CEPHALEXIN 250 MG/1
500 CAPSULE ORAL ONCE
Status: COMPLETED | OUTPATIENT
Start: 2019-01-23 | End: 2019-01-23

## 2019-01-23 RX ORDER — DIPHENHYDRAMINE HYDROCHLORIDE 50 MG/ML
25 INJECTION INTRAMUSCULAR; INTRAVENOUS ONCE
Status: COMPLETED | OUTPATIENT
Start: 2019-01-23 | End: 2019-01-23

## 2019-01-23 RX ORDER — KETOROLAC TROMETHAMINE 10 MG/1
10 TABLET, FILM COATED ORAL EVERY 6 HOURS PRN
Qty: 20 TABLET | Refills: 0 | Status: SHIPPED | OUTPATIENT
Start: 2019-01-23 | End: 2019-05-15

## 2019-01-23 RX ORDER — IBUPROFEN 400 MG/1
400 TABLET ORAL ONCE
Status: DISCONTINUED | OUTPATIENT
Start: 2019-01-23 | End: 2019-01-23

## 2019-01-23 RX ORDER — SULFAMETHOXAZOLE AND TRIMETHOPRIM 800; 160 MG/1; MG/1
1 TABLET ORAL ONCE
Status: COMPLETED | OUTPATIENT
Start: 2019-01-23 | End: 2019-01-23

## 2019-01-23 RX ADMIN — METOCLOPRAMIDE 10 MG: 5 INJECTION, SOLUTION INTRAMUSCULAR; INTRAVENOUS at 16:42

## 2019-01-23 RX ADMIN — SULFAMETHOXAZOLE AND TRIMETHOPRIM 1 TABLET: 800; 160 TABLET ORAL at 16:16

## 2019-01-23 RX ADMIN — CEPHALEXIN 500 MG: 250 CAPSULE ORAL at 16:16

## 2019-01-23 RX ADMIN — SODIUM CHLORIDE 1000 ML: 0.9 INJECTION, SOLUTION INTRAVENOUS at 16:43

## 2019-01-23 RX ADMIN — DIPHENHYDRAMINE HYDROCHLORIDE 25 MG: 50 INJECTION, SOLUTION INTRAMUSCULAR; INTRAVENOUS at 16:42

## 2019-01-23 RX ADMIN — MAGNESIUM SULFATE HEPTAHYDRATE 2 G: 40 INJECTION, SOLUTION INTRAVENOUS at 16:46

## 2019-01-23 RX ADMIN — KETOROLAC TROMETHAMINE 30 MG: 30 INJECTION, SOLUTION INTRAMUSCULAR at 16:42

## 2019-01-23 NOTE — DISCHARGE INSTRUCTIONS
Abscess   WHAT YOU NEED TO KNOW:   A warm compress may help your abscess drain  Your healthcare provider may make a cut in the abscess so it can drain  You may need surgery to remove an abscess that is on your hands or buttocks  DISCHARGE INSTRUCTIONS:   Return to the emergency department if:   · The area around your abscess becomes very painful, warm, or has red streaks  · You have a fever and chills  · Your heart is beating faster than usual      · You feel faint or confused  Contact your healthcare provider if:   · Your abscess gets bigger or does not get better  · Your abscess returns  · You have questions or concerns about your condition or care  Medicines: You may  need any of the following:  · Antibiotics  help treat a bacterial infection  · Acetaminophen  decreases pain and fever  It is available without a doctor's order  Ask how much to take and how often to take it  Follow directions  Acetaminophen can cause liver damage if not taken correctly  · NSAIDs , such as ibuprofen, help decrease swelling, pain, and fever  This medicine is available with or without a doctor's order  NSAIDs can cause stomach bleeding or kidney problems in certain people  If you take blood thinner medicine, always ask your healthcare provider if NSAIDs are safe for you  Always read the medicine label and follow directions  · Take your medicine as directed  Contact your healthcare provider if you think your medicine is not helping or if you have side effects  Tell him or her if you are allergic to any medicine  Keep a list of the medicines, vitamins, and herbs you take  Include the amounts, and when and why you take them  Bring the list or the pill bottles to follow-up visits  Carry your medicine list with you in case of an emergency  Self-care:   · Apply a warm compress to your abscess  This will help it open and drain  Wet a washcloth in warm, but not hot, water  Apply the compress for 10 minutes  Repeat this 4 times each day  Do not  press on an abscess or try to open it with a needle  You may push the bacteria deeper or into your blood  · Do not share your clothes, towels, or sheets with anyone  This can spread the infection to others  · Wash your hands often  This can help prevent the spread of germs  Use soap and water or an alcohol-based hand rub  Care for your wound after it is drained:   · Care for your wound as directed  If your healthcare provider says it is okay, carefully remove the bandage and gauze packing  You may need to soak the gauze to get it out of your wound  Clean your wound and the area around it as directed  Dry the area and put on new, clean bandages  Change your bandages when they get wet or dirty  · Ask your healthcare provider how to change the gauze in your wound  Keep track of how many pieces of gauze are placed inside the wound  Do not put too much packing in the wound  Do not pack the gauze too tightly in your wound  Follow up with your healthcare provider in 1 to 3 days: You may need to have your packing removed or your bandage changed  Write down your questions so you remember to ask them during your visits  © 2017 [facility] Information is for End User's use only and may not be sold, redistributed or otherwise used for commercial purposes  All illustrations and images included in CareNotes® are the copyrighted property of A D A M , Inc  or [employer]  The above information is an  only  It is not intended as medical advice for individual conditions or treatments  Talk to your doctor, nurse or pharmacist before following any medical regimen to see if it is safe and effective for you  Migraine Headache   WHAT YOU SHOULD KNOW:   A migraine is a severe headache  The pain can be so severe that it interferes with your daily activities  A migraine can last a few hours up to several days   The exact cause of migraines is not known  It may be caused by changes in your body chemicals and extra sensitive nerves in your brain  AFTER YOU LEAVE:   Medicines:  Take medicine as soon as you feel a migraine begin  · Pain medicine: You may need medicine to take away or decrease pain  You may need a doctor's order for this medicine  Do not wait until the pain is severe before you take your medicine  · Migraine medicines: These are used to help prevent a migraine or stop it once it starts  · Antinausea medicine: This medicine may be given to calm your stomach and to help prevent vomiting  They can also help relieve pain  · Take your medicine as directed  Call your healthcare provider if you think your medicine is not helping or if you have side effects  Tell him if you are allergic to any medicine  Keep a list of the medicines, vitamins, and herbs you take  Include the amounts, and when and why you take them  Bring the list or the pill bottles to follow-up visits  Carry your medicine list with you in case of an emergency  Manage your symptoms:   · Rest:  Rest in a dark, quiet room  This will help decrease your pain  · Ice:  Ice helps decrease pain  Use an ice pack or put crushed ice in a plastic bag  Cover the ice pack with a towel and place it on your head where it hurts for 15 to 20 minutes every hour  · Heat:  Heat helps decrease pain and muscle spasms  Use a small towel dampened with warm water or a heating pad, or sit in a warm bath  Apply heat on the area for 20 to 30 minutes every 2 hours  You may alternate heat and ice  Keep a headache diary:  Write down when your migraines start and stop  Include your symptoms and what you were doing when a migraine began  Record what you ate or drank for 24 hours before the migraine started  Describe the pain and where it hurts  Keep track of what you did to treat your migraine and whether it worked     Follow up with your primary healthcare provider or neurologist as directed:  Bring your headache diary with you when you see your primary healthcare provider  Write down your questions so you remember to ask them during your visits  Prevent another migraine:   · Do not smoke: If you smoke, it is never too late to quit  Tobacco smoke can trigger a migraine  It can also cause heart disease, lung disease, cancer, and other health problems  Quitting smoking will improve your health and the health of those around you  If you smoke, ask for information about how to stop  · Do not drink alcohol:  Alcohol can trigger a migraine  It can also interfere with the medicines used to treat your migraine  · Get regular exercise:  Exercise may help prevent migraines  Talk to your primary healthcare provider about the best exercise plan for you  · Manage stress:  Stress may trigger a migraine  Learn new ways to relax, such as deep breathing  · Stick to a sleep schedule:  Go to bed and get up at the same time each day  · Eat regular meals:  Include healthy foods such as include fruit, vegetables, whole-grain breads, low-fat dairy products, beans, lean meat, and fish  Avoid trigger foods like chocolate, hard cheese, and red wine  Foods that contain gluten, nitrates, MSG, or artificial sweeteners may also trigger migraines  Caffeine, which is often used to treat migraines, can also trigger them  Contact your primary healthcare provider or neurologist if:   · You have a fever  · Your migraines interfere with your daily activities  · Your medicines or treatments stop working  · You have questions about your condition or care  Seek care immediately or call 911 if:   · You have a headache that seems different or much worse than your usual migraine headache  · You have a severe headache with a fever or a stiff neck  · You have new problems with speech, vision, balance, or movement      · You feel like you are going to faint, you become confused, or you have a seizure  © 2014 [address] is for End User's use only and may not be sold, redistributed or otherwise used for commercial purposes  All illustrations and images included in CareNotes® are the copyrighted property of A D A M , Inc  or [employer]  The above information is an  only  It is not intended as medical advice for individual conditions or treatments  Talk to your doctor, nurse or pharmacist before following any medical regimen to see if it is safe and effective for you  Cellulitis   WHAT YOU NEED TO KNOW:   Cellulitis is a skin infection caused by bacteria  Cellulitis may go away on its own or you may need treatment  Your healthcare provider may draw a [place] around the outside edges of your cellulitis  If your cellulitis spreads, your healthcare provider will see it outside of the [place]  DISCHARGE INSTRUCTIONS:   Call 911 if:   · You have sudden trouble breathing or chest pain  Return to the emergency department if:   · Your wound gets larger and more painful  · You feel a crackling under your skin when you touch it  · You have purple dots or bumps on your skin, or you see bleeding under your skin  · You have new swelling and pain in your legs  · The red, warm, swollen area gets larger  · You see red streaks coming from the infected area  Contact your healthcare provider if:   · You have a fever  · Your fever or pain does not go away or gets worse  · The area does not get smaller after 2 days of antibiotics  · Your skin is flaking or peeling off  · You have questions or concerns about your condition or care  Medicines:   · Antibiotics  help treat the bacterial infection  · NSAIDs , such as ibuprofen, help decrease swelling, pain, and fever  NSAIDs can cause stomach bleeding or kidney problems in certain people  If you take blood thinner medicine, always ask if NSAIDs are safe for you   Always read the medicine label and follow directions  Do not give these medicines to children under 10months of age without direction from your child's healthcare provider  · Acetaminophen  decreases pain and fever  It is available without a doctor's order  Ask how much to take and how often to take it  Follow directions  Read the labels of all other medicines you are using to see if they also contain acetaminophen, or ask your doctor or pharmacist  Acetaminophen can cause liver damage if not taken correctly  Do not use more than 4 grams (4,000 milligrams) total of acetaminophen in one day  · Take your medicine as directed  Contact your healthcare provider if you think your medicine is not helping or if you have side effects  Tell him or her if you are allergic to any medicine  Keep a list of the medicines, vitamins, and herbs you take  Include the amounts, and when and why you take them  Bring the list or the pill bottles to follow-up visits  Carry your medicine list with you in case of an emergency  Self-care:   · Elevate the area above the level of your heart  as often as you can  This will help decrease swelling and pain  Prop the area on pillows or blankets to keep it elevated comfortably  · Clean the area daily until the wound scabs over  Gently wash the area with soap and water  Pat dry  Use dressings as directed  · Place cool or warm, wet cloths on the area as directed  Use clean cloths and clean water  Leave it on the area until the cloth is room temperature  Pat the area dry with a clean, dry cloth  The cloths may help decrease pain  Prevent cellulitis:   · Do not scratch bug bites or areas of injury  You increase your risk for cellulitis by scratching these areas  · Do not share personal items, such as towels, clothing, and razors  · Clean exercise equipment  with germ-killing  before and after you use it  · Wash your hands often  Use soap and water   Wash your hands after you use the bathroom, change a child's diapers, or sneeze  Wash your hands before you prepare or eat food  Use lotion to prevent dry, cracked skin  · Wear pressure stockings as directed  You may be told to wear the stockings if you have peripheral edema  The stockings improve blood flow and decrease swelling  · Treat athlete's foot  This can help prevent the spread of a bacterial skin infection  Follow up with your healthcare provider within 3 days, or as directed: Your healthcare provider will check if your cellulitis is getting better  You may need different medicine  Write down your questions so you remember to ask them during your visits  © 2017 [facility] [facility] Information is for End User's use only and may not be sold, redistributed or otherwise used for commercial purposes  All illustrations and images included in CareNotes® are the copyrighted property of A D A M , Inc  or [employer]  The above information is an  only  It is not intended as medical advice for individual conditions or treatments  Talk to your doctor, nurse or pharmacist before following any medical regimen to see if it is safe and effective for you

## 2019-01-23 NOTE — ED PROVIDER NOTES
History  Chief Complaint   Patient presents with    Cyst     Pt presents w/ c/o HA, multiple cysts around her vagina, and left sided foot pain starting 3 days ago  Denies any injury, pt has diabetic neuropathy     Foot Pain     20-year-old female presents emergency room for evaluation of multiple complaints  Patient complains of migraine headache, left foot pain, and groin abscess  Patient states that abscess has come and gone for a very long time  She recently finished course of doxycycline 2 days ago and it has not improved  She tried draining at home with a needle but did not get anything out  She denies fever, nausea vomiting  Patient admits to having abscesses drained in the past   She does not currently have any type of follow-up with OBGYN  In regards to patient's headache is described behind her forehead causing light sensitivity, pain is difficult to describe  Admits to feeling lightheaded or dizzy with this  She denies any weakness, confusion, numbness  Patient admits to history of migraines in the past but not in the very long time  States she has also had a history of nerve damage in the brain for which she had a a lumbar puncture and MRI  Patient states she never followed up with a neurologist after this  Admits to a history of difficulty focusing the left eye  Furthermore the left foot pain also began around 3 days ago  Described as a throbbing burning sensation  States there is a red spot along the top of her foot that is painful  Admits to a generalized mild swelling  She denies injury to the foot  Denies itching  Denies drainage from the foot  Admits to chronic tingling of her feet secondary to her diabetes  Patient has been taking 800 mg ibuprofen without relief  History provided by:  Patient      Prior to Admission Medications   Prescriptions Last Dose Informant Patient Reported? Taking?    TiZANidine (ZANAFLEX) 2 MG capsule   No No   Sig: Take 1 capsule (2 mg total) by mouth 3 (three) times a day After 2 days may Increase to 2 tabs if needed   ketorolac (TORADOL) 10 mg tablet   No No   Sig: Take 1 tablet (10 mg total) by mouth every 6 (six) hours as needed for moderate pain   lidocaine (LIDODERM) 5 %   No No   Sig: Apply 1 patch topically daily Remove & Discard patch within 12 hours or as directed by MD      Facility-Administered Medications: None       Past Medical History:   Diagnosis Date    Asthma     Bipolar 1 disorder ([place] [address] )     COPD (chronic obstructive pulmonary disease) ([place] [address] )     Depression     Diabetes mellitus ([place] [address] )     Hypertension     Psychiatric disorder     PTSD (post-traumatic stress disorder)     Tendonitis     L shoulder       History reviewed  No pertinent surgical history  History reviewed  No pertinent family history  I have reviewed and agree with the history as documented  Social History   Substance Use Topics    Smoking status: Current Every Day Smoker    Smokeless tobacco: Never Used    Alcohol use No        Review of Systems   Constitutional: Negative for chills and fever  Eyes: Positive for photophobia  Gastrointestinal: Negative for abdominal pain and vomiting  Skin: Positive for rash  Neurological: Positive for dizziness and headaches  Psychiatric/Behavioral: Negative for confusion  Physical Exam  Physical Exam   Constitutional: She is oriented to person, place, and time  She appears well-developed and well-nourished  HENT:   Head: Normocephalic and atraumatic  Right Ear: External ear normal    Left Ear: External ear normal    Mouth/Throat: Oropharynx is clear and moist    Eyes: Pupils are equal, round, and reactive to light  Conjunctivae and EOM are normal    Neck: Normal range of motion  Neck supple  Cardiovascular: Normal rate, regular rhythm, normal heart sounds and intact distal pulses  No murmur heard  Pulmonary/Chest: Effort normal and breath sounds normal    Abdominal: Soft         Musculoskeletal: Left ankle: Normal         Left foot: There is decreased range of motion, tenderness and swelling  There is normal capillary refill and no deformity  Feet:    Lymphadenopathy:     She has no cervical adenopathy  Neurological: She is alert and oriented to person, place, and time  No cranial nerve deficit  She exhibits normal muscle tone  Coordination normal    Skin: Skin is warm and dry  No pallor  Psychiatric: She has a normal mood and affect  Nursing note and vitals reviewed        Vital Signs  ED Triage Vitals [01/23/19 1403]   Temperature Pulse Respirations Blood Pressure SpO2   98 °F (36 7 °C) (!) 107 18 (!) 180/87 98 %      Temp Source Heart Rate Source Patient Position - Orthostatic VS BP Location FiO2 (%)   Oral Monitor Sitting Left arm --      Pain Score       Worst Possible Pain           Vitals:    01/23/19 1403   BP: (!) 180/87   Pulse: (!) 107   Patient Position - Orthostatic VS: Sitting       Visual Acuity      ED Medications  Medications   diphenhydrAMINE (BENADRYL) injection 25 mg (25 mg Intravenous Given 1/23/19 1642)   metoclopramide (REGLAN) injection 10 mg (10 mg Intravenous Given 1/23/19 1642)   ketorolac (TORADOL) injection 30 mg (30 mg Intravenous Given 1/23/19 1642)   magnesium sulfate 2 g/50 mL IVPB (premix) 2 g (0 g Intravenous Stopped 1/23/19 1805)   sodium chloride 0 9 % bolus 1,000 mL (0 mL Intravenous Stopped 1/23/19 1805)   cephalexin (KEFLEX) capsule 500 mg (500 mg Oral Given 1/23/19 1616)   sulfamethoxazole-trimethoprim (BACTRIM DS) 800-160 mg per tablet 1 tablet (1 tablet Oral Given 1/23/19 1616)       Diagnostic Studies  Results Reviewed     Procedure Component Value Units Date/Time    Basic metabolic panel [[id number]]  (Abnormal) Collected:  01/23/19 1648    Lab Status:  Final result Specimen:  Blood from Arm, Left Updated:  01/23/19 1711     Sodium 137 mmol/L      Potassium 4 3 mmol/L      Chloride 100 mmol/L      CO2 30 mmol/L      ANION GAP 7 mmol/L      BUN 15 mg/dL      Creatinine 0 65 mg/dL      Glucose 246 (H) mg/dL      Calcium 9 1 mg/dL      eGFR 136 ml/min/1 73sq m     Narrative:         National Kidney Disease Education Program recommendations are as follows:  GFR calculation is accurate only with a steady state creatinine  Chronic Kidney disease less than 60 ml/min/1 73 sq  meters  Kidney failure less than 15 ml/min/1 73 sq  meters  XR foot 3+ views LEFT   ED Interpretation by Adán Rahman PA-C (01/23 1720)   No acute disease                 Procedures  Procedures       Phone Contacts  ED Phone Contact    ED Course  ED Course as of Jan 23 1810 Wed Jan 23, 2019   1730 Headache greatly improved                                MDM  Number of Diagnoses or Management Options  Abscess of groin, right:   Cellulitis of left foot:   Hyperglycemia:   Migraine:      Amount and/or Complexity of Data Reviewed  Clinical lab tests: ordered and reviewed  Tests in the radiology section of CPT®: ordered and reviewed    Patient Progress  Patient progress: improved    CritCare Time    Disposition  Final diagnoses:   Migraine   Abscess of groin, right   Cellulitis of left foot   Hyperglycemia     Time reflects when diagnosis was documented in both MDM as applicable and the Disposition within this note     Time User Action Codes Description Comment    1/23/2019  5:57 PM Marcianne Gun Add [G43 909] Migraine     1/23/2019  5:57 PM Marcianne Gun Add [L02 214] Abscess of groin, right     1/23/2019  6:05 PM Mitra Piper L Add [L03 116] Cellulitis of left foot     1/23/2019  6:05 PM Mitra Piper L Add [R73 9] Hyperglycemia       ED Disposition     ED Disposition Condition Comment    Discharge  Thad Dickens discharge to home/self care  Condition at discharge: Good        Follow-up Information     Follow up With Specialties Details Why Contact Info Additional Information    Eloy Chavez DPM Podiatry In 3 days foot [address] [facility],[address] [facility], DO Family Medicine In 3 days migraine and abscess [address] [place] [address] [address]       [place] [address] Emergency Department Emergency Medicine  If symptoms worsen [address] [facility] [address] [phone] AN ED,  [address], [place], [place], [address]          Patient's Medications   Discharge Prescriptions    CEPHALEXIN (KEFLEX) 500 MG CAPSULE    Take 1 capsule (500 mg total) by mouth 3 (three) times a day for 10 days       Start Date: 1/23/2019 End Date: 2/2/2019       Order Dose: 500 mg       Quantity: 30 capsule    Refills: 0    KETOROLAC (TORADOL) 10 MG TABLET    Take 1 tablet (10 mg total) by mouth every 6 (six) hours as needed for moderate pain Take with food       Start Date: 1/23/2019 End Date: --       Order Dose: 10 mg       Quantity: 20 tablet    Refills: 0    SULFAMETHOXAZOLE-TRIMETHOPRIM (BACTRIM DS) 800-160 MG PER TABLET    Take 1 tablet by mouth 2 (two) times a day for 10 days       Start Date: 1/23/2019 End Date: 2/2/2019       Order Dose: 1 tablet       Quantity: 20 tablet    Refills: 0     No discharge procedures on file      ED Provider  Electronically Signed by           Adán Rahman PA-C  01/23/19 6810

## 2019-05-15 ENCOUNTER — HOSPITAL ENCOUNTER (EMERGENCY)
Facility: HOSPITAL | Age: 34
Discharge: HOME | End: 2019-05-15
Attending: EMERGENCY MEDICINE
Payer: MEDICARE

## 2019-05-15 VITALS
DIASTOLIC BLOOD PRESSURE: 67 MMHG | HEART RATE: 108 BPM | RESPIRATION RATE: 20 BRPM | SYSTOLIC BLOOD PRESSURE: 126 MMHG | OXYGEN SATURATION: 99 % | WEIGHT: 260.14 LBS | TEMPERATURE: 97.5 F

## 2019-05-15 DIAGNOSIS — R11.0 NAUSEA: ICD-10-CM

## 2019-05-15 DIAGNOSIS — R51.9 ACUTE NONINTRACTABLE HEADACHE, UNSPECIFIED HEADACHE TYPE: Primary | ICD-10-CM

## 2019-05-15 DIAGNOSIS — R73.9 HYPERGLYCEMIA: ICD-10-CM

## 2019-05-15 LAB
ALBUMIN SERPL BCP-MCNC: 3.3 G/DL (ref 3.5–5)
ALP SERPL-CCNC: 102 U/L (ref 46–116)
ALT SERPL W P-5'-P-CCNC: 28 U/L (ref 12–78)
ANION GAP SERPL CALCULATED.3IONS-SCNC: 9 MMOL/L (ref 4–13)
AST SERPL W P-5'-P-CCNC: 12 U/L (ref 5–45)
BASE EX.OXY STD BLDV CALC-SCNC: 73 % (ref 60–80)
BASE EXCESS BLDV CALC-SCNC: 0.8 MMOL/L
BASOPHILS # BLD AUTO: 0.02 THOUSANDS/ΜL (ref 0–0.1)
BASOPHILS NFR BLD AUTO: 0 % (ref 0–1)
BETA-HYDROXYBUTYRATE: 0.3 MMOL/L
BILIRUB SERPL-MCNC: 0.4 MG/DL (ref 0.2–1)
BILIRUB UR QL STRIP: NEGATIVE
BUN SERPL-MCNC: 15 MG/DL (ref 5–25)
CALCIUM SERPL-MCNC: 8.9 MG/DL (ref 8.3–10.1)
CHLORIDE SERPL-SCNC: 103 MMOL/L (ref 100–108)
CLARITY UR: CLEAR
CO2 SERPL-SCNC: 26 MMOL/L (ref 21–32)
COLOR UR: YELLOW
CREAT SERPL-MCNC: 0.65 MG/DL (ref 0.6–1.3)
EOSINOPHIL # BLD AUTO: 0.14 THOUSAND/ΜL (ref 0–0.61)
EOSINOPHIL NFR BLD AUTO: 2 % (ref 0–6)
ERYTHROCYTE [DISTWIDTH] IN BLOOD BY AUTOMATED COUNT: 14.1 % (ref 11.6–15.1)
EXT PREG TEST URINE: NEGATIVE
GFR SERPL CREATININE-BSD FRML MDRD: 136 ML/MIN/1.73SQ M
GLUCOSE SERPL-MCNC: 192 MG/DL (ref 65–140)
GLUCOSE UR STRIP-MCNC: NEGATIVE MG/DL
HCO3 BLDV-SCNC: 26.6 MMOL/L (ref 24–30)
HCT VFR BLD AUTO: 42.3 % (ref 34.8–46.1)
HGB BLD-MCNC: 15.1 G/DL (ref 11.5–15.4)
HGB UR QL STRIP.AUTO: NEGATIVE
IMM GRANULOCYTES # BLD AUTO: 0.02 THOUSAND/UL (ref 0–0.2)
IMM GRANULOCYTES NFR BLD AUTO: 0 % (ref 0–2)
KETONES UR STRIP-MCNC: NEGATIVE MG/DL
LEUKOCYTE ESTERASE UR QL STRIP: NEGATIVE
LYMPHOCYTES # BLD AUTO: 2.12 THOUSANDS/ΜL (ref 0.6–4.47)
LYMPHOCYTES NFR BLD AUTO: 30 % (ref 14–44)
MCH RBC QN AUTO: 27 PG (ref 26.8–34.3)
MCHC RBC AUTO-ENTMCNC: 35.7 G/DL (ref 31.4–37.4)
MCV RBC AUTO: 76 FL (ref 82–98)
MONOCYTES # BLD AUTO: 0.33 THOUSAND/ΜL (ref 0.17–1.22)
MONOCYTES NFR BLD AUTO: 5 % (ref 4–12)
NEUTROPHILS # BLD AUTO: 4.43 THOUSANDS/ΜL (ref 1.85–7.62)
NEUTS SEG NFR BLD AUTO: 63 % (ref 43–75)
NITRITE UR QL STRIP: NEGATIVE
NRBC BLD AUTO-RTO: 0 /100 WBCS
O2 CT BLDV-SCNC: 16.3 ML/DL
PCO2 BLDV: 46.3 MM HG (ref 42–50)
PH BLDV: 7.38 [PH] (ref 7.3–7.4)
PH UR STRIP.AUTO: 6 [PH]
PLATELET # BLD AUTO: 312 THOUSANDS/UL (ref 149–390)
PMV BLD AUTO: 10.3 FL (ref 8.9–12.7)
PO2 BLDV: 41.4 MM HG (ref 35–45)
POTASSIUM SERPL-SCNC: 3.5 MMOL/L (ref 3.5–5.3)
PROT SERPL-MCNC: 7.3 G/DL (ref 6.4–8.2)
PROT UR STRIP-MCNC: NEGATIVE MG/DL
RBC # BLD AUTO: 5.59 MILLION/UL (ref 3.81–5.12)
SODIUM SERPL-SCNC: 138 MMOL/L (ref 136–145)
SP GR UR STRIP.AUTO: 1.02 (ref 1–1.03)
UROBILINOGEN UR QL STRIP.AUTO: 0.2 E.U./DL
WBC # BLD AUTO: 7.06 THOUSAND/UL (ref 4.31–10.16)

## 2019-05-15 PROCEDURE — 96361 HYDRATE IV INFUSION ADD-ON: CPT

## 2019-05-15 PROCEDURE — 96374 THER/PROPH/DIAG INJ IV PUSH: CPT

## 2019-05-15 PROCEDURE — 96375 TX/PRO/DX INJ NEW DRUG ADDON: CPT

## 2019-05-15 PROCEDURE — 99284 EMERGENCY DEPT VISIT MOD MDM: CPT | Performed by: EMERGENCY MEDICINE

## 2019-05-15 PROCEDURE — 81003 URINALYSIS AUTO W/O SCOPE: CPT | Performed by: EMERGENCY MEDICINE

## 2019-05-15 PROCEDURE — 80053 COMPREHEN METABOLIC PANEL: CPT | Performed by: EMERGENCY MEDICINE

## 2019-05-15 PROCEDURE — 82010 KETONE BODYS QUAN: CPT | Performed by: EMERGENCY MEDICINE

## 2019-05-15 PROCEDURE — 81025 URINE PREGNANCY TEST: CPT | Performed by: EMERGENCY MEDICINE

## 2019-05-15 PROCEDURE — 99284 EMERGENCY DEPT VISIT MOD MDM: CPT

## 2019-05-15 PROCEDURE — 85025 COMPLETE CBC W/AUTO DIFF WBC: CPT | Performed by: EMERGENCY MEDICINE

## 2019-05-15 PROCEDURE — 93005 ELECTROCARDIOGRAM TRACING: CPT

## 2019-05-15 PROCEDURE — 82805 BLOOD GASES W/O2 SATURATION: CPT | Performed by: EMERGENCY MEDICINE

## 2019-05-15 PROCEDURE — 36415 COLL VENOUS BLD VENIPUNCTURE: CPT | Performed by: EMERGENCY MEDICINE

## 2019-05-15 RX ORDER — KETOROLAC TROMETHAMINE 30 MG/ML
15 INJECTION, SOLUTION INTRAMUSCULAR; INTRAVENOUS ONCE
Status: COMPLETED | OUTPATIENT
Start: 2019-05-15 | End: 2019-05-15

## 2019-05-15 RX ORDER — METOCLOPRAMIDE 10 MG/1
10 TABLET ORAL 3 TIMES DAILY PRN
Qty: 12 TABLET | Refills: 0 | Status: SHIPPED | OUTPATIENT
Start: 2019-05-15 | End: 2020-08-11 | Stop reason: ALTCHOICE

## 2019-05-15 RX ORDER — MORPHINE SULFATE 4 MG/ML
4 INJECTION, SOLUTION INTRAMUSCULAR; INTRAVENOUS ONCE
Status: DISCONTINUED | OUTPATIENT
Start: 2019-05-15 | End: 2019-05-15

## 2019-05-15 RX ORDER — KETOROLAC TROMETHAMINE 10 MG/1
10 TABLET, FILM COATED ORAL EVERY 6 HOURS PRN
Qty: 12 TABLET | Refills: 0 | Status: SHIPPED | OUTPATIENT
Start: 2019-05-15 | End: 2020-08-11 | Stop reason: ALTCHOICE

## 2019-05-15 RX ORDER — ONDANSETRON 2 MG/ML
4 INJECTION INTRAMUSCULAR; INTRAVENOUS ONCE
Status: COMPLETED | OUTPATIENT
Start: 2019-05-15 | End: 2019-05-15

## 2019-05-15 RX ORDER — METOCLOPRAMIDE HYDROCHLORIDE 5 MG/ML
10 INJECTION INTRAMUSCULAR; INTRAVENOUS ONCE
Status: COMPLETED | OUTPATIENT
Start: 2019-05-15 | End: 2019-05-15

## 2019-05-15 RX ORDER — DIPHENHYDRAMINE HYDROCHLORIDE 50 MG/ML
25 INJECTION INTRAMUSCULAR; INTRAVENOUS ONCE
Status: COMPLETED | OUTPATIENT
Start: 2019-05-15 | End: 2019-05-15

## 2019-05-15 RX ORDER — TRAZODONE HYDROCHLORIDE 100 MG/1
200 TABLET ORAL
COMMUNITY
End: 2020-08-11 | Stop reason: SDUPTHER

## 2019-05-15 RX ADMIN — KETOROLAC TROMETHAMINE 15 MG: 30 INJECTION, SOLUTION INTRAMUSCULAR at 16:10

## 2019-05-15 RX ADMIN — SODIUM CHLORIDE 1000 ML: 0.9 INJECTION, SOLUTION INTRAVENOUS at 16:11

## 2019-05-15 RX ADMIN — ONDANSETRON 4 MG: 2 INJECTION INTRAMUSCULAR; INTRAVENOUS at 16:10

## 2019-05-15 RX ADMIN — METOCLOPRAMIDE 10 MG: 5 INJECTION, SOLUTION INTRAMUSCULAR; INTRAVENOUS at 16:50

## 2019-05-15 RX ADMIN — DIPHENHYDRAMINE HYDROCHLORIDE 25 MG: 50 INJECTION, SOLUTION INTRAMUSCULAR; INTRAVENOUS at 16:48

## 2019-05-16 LAB
ATRIAL RATE: 81 BPM
P AXIS: 69 DEGREES
PR INTERVAL: 150 MS
QRS AXIS: 73 DEGREES
QRSD INTERVAL: 84 MS
QT INTERVAL: 360 MS
QTC INTERVAL: 418 MS
T WAVE AXIS: 24 DEGREES
VENTRICULAR RATE: 81 BPM

## 2019-05-16 PROCEDURE — 93010 ELECTROCARDIOGRAM REPORT: CPT | Performed by: INTERNAL MEDICINE

## 2020-02-10 ENCOUNTER — TRANSCRIBE ORDERS (OUTPATIENT)
Dept: LAB | Facility: CLINIC | Age: 35
End: 2020-02-10

## 2020-02-10 ENCOUNTER — OFFICE VISIT (OUTPATIENT)
Dept: OBGYN CLINIC | Facility: CLINIC | Age: 35
End: 2020-02-10

## 2020-02-10 ENCOUNTER — LAB (OUTPATIENT)
Dept: LAB | Facility: CLINIC | Age: 35
End: 2020-02-10
Payer: MEDICARE

## 2020-02-10 VITALS
BODY MASS INDEX: 43.05 KG/M2 | HEIGHT: 61 IN | WEIGHT: 228 LBS | DIASTOLIC BLOOD PRESSURE: 84 MMHG | SYSTOLIC BLOOD PRESSURE: 139 MMHG | HEART RATE: 81 BPM

## 2020-02-10 DIAGNOSIS — Z20.2 POSSIBLE EXPOSURE TO STD: ICD-10-CM

## 2020-02-10 DIAGNOSIS — Z01.419 ENCOUNTER FOR GYNECOLOGICAL EXAMINATION WITHOUT ABNORMAL FINDING: Primary | ICD-10-CM

## 2020-02-10 LAB — HBV SURFACE AG SER QL: NORMAL

## 2020-02-10 PROCEDURE — 87389 HIV-1 AG W/HIV-1&-2 AB AG IA: CPT

## 2020-02-10 PROCEDURE — 87624 HPV HI-RISK TYP POOLED RSLT: CPT | Performed by: NURSE PRACTITIONER

## 2020-02-10 PROCEDURE — 87340 HEPATITIS B SURFACE AG IA: CPT

## 2020-02-10 PROCEDURE — 86592 SYPHILIS TEST NON-TREP QUAL: CPT

## 2020-02-10 PROCEDURE — G0101 CA SCREEN;PELVIC/BREAST EXAM: HCPCS | Performed by: NURSE PRACTITIONER

## 2020-02-10 PROCEDURE — 87591 N.GONORRHOEAE DNA AMP PROB: CPT | Performed by: NURSE PRACTITIONER

## 2020-02-10 PROCEDURE — 36415 COLL VENOUS BLD VENIPUNCTURE: CPT

## 2020-02-10 PROCEDURE — G0145 SCR C/V CYTO,THINLAYER,RESCR: HCPCS | Performed by: NURSE PRACTITIONER

## 2020-02-10 PROCEDURE — 87491 CHLMYD TRACH DNA AMP PROBE: CPT | Performed by: NURSE PRACTITIONER

## 2020-02-10 RX ORDER — ATORVASTATIN CALCIUM 10 MG/1
10 TABLET, FILM COATED ORAL DAILY
Status: ON HOLD | COMMUNITY
Start: 2018-07-18 | End: 2020-08-08 | Stop reason: SDUPTHER

## 2020-02-10 RX ORDER — ALBUTEROL SULFATE 90 UG/1
2 AEROSOL, METERED RESPIRATORY (INHALATION) EVERY 6 HOURS PRN
COMMUNITY
Start: 2019-10-07 | End: 2020-08-11 | Stop reason: SDUPTHER

## 2020-02-10 RX ORDER — LAMOTRIGINE 25 MG/1
TABLET ORAL
COMMUNITY
Start: 2020-02-04 | End: 2020-08-11 | Stop reason: SDUPTHER

## 2020-02-10 RX ORDER — QUETIAPINE FUMARATE 300 MG/1
TABLET, FILM COATED ORAL
COMMUNITY
Start: 2020-02-06 | End: 2020-08-11 | Stop reason: SDUPTHER

## 2020-02-10 RX ORDER — INSULIN GLARGINE 100 [IU]/ML
INJECTION, SOLUTION SUBCUTANEOUS
COMMUNITY
Start: 2019-12-16 | End: 2020-08-11 | Stop reason: ALTCHOICE

## 2020-02-10 NOTE — PATIENT INSTRUCTIONS
Cigarette Smoking and Your Health, Ambulatory Care   GENERAL INFORMATION:   What are the risks to my health if I smoke? Chemicals in tobacco are addictive and damage every cell in your body  All tobacco products are dangerous to you and to nonsmokers who breathe your secondhand smoke  Even if you are a light smoker or a social smoker, you have an increased risk for cancer, heart disease, and lung disease  If you are pregnant or have diabetes, smoking increases your risk for complications  What are the benefits to my health if I stop smoking? · Lower risk of cancer, heart disease, blood clots, heart attack, and stroke    · Lower risk of diabetes complications, such as kidney, artery, or eye disease, and nerve damage that can result in amputations    · Lower risk of lung infections and diseases, such as pneumonia, asthma, chronic bronchitis, and emphysema           · Increased benefits of chemotherapy if you already have cancer, and decreased risk for cancer returning after treatment    · Improved circulation, allowing more oxygen to be delivered to your body    · Improved ability to heal and to fight infections  What are the benefits to the health of others if I stop smoking? · Lower risk of lung cancer and heart disease in nonsmokers    · Lower risk of miscarriage, early delivery, low birth weight, and stillbirth    · Lower risk of SIDS, obesity, developmental delay, ear infections, colds, pneumonia, bronchitis, asthma, and ADHD in your child  Where can I find more information and support to stop smoking? It is never too late to stop smoking  Ask your healthcare provider for more information if you need help  · Smokefree  [employer]  Phone: 3- 203 - [phone]  Web Address: [employer]  Follow up with your healthcare provider as directed:  Write down your questions so you remember to ask them during your visits  CARE AGREEMENT:   You have the right to help plan your care   Learn about your health condition

## 2020-02-10 NOTE — PROGRESS NOTES
ASSESSMENT & PLAN: Courtney Cruz is a 36 y o  [id number] obstetric history on file  with normal gynecologic exam     1   Routine well woman exam done today  2  Pap and HPV:  The patient's last pap and hpv was 1-2  years ago per patient, was done in [place]  I do not have record of this  It was normal     Pap and cotesting was done today  Current ASCCP Guidelines reviewed  3   The following were reviewed in today's visit: breast self exam, STD testing, HIV risk factors and prevention, adequate intake of calcium and vitamin D, exercise, healthy diet and tobacco cessation  Desires STD testing, cultures for chlamydia gonorrhea, HIV, RPR, hepatitis-B S Ag given to patient to complete  ABN  Form given to patient to sign, reviewed insurance may not cover and patient states she wants testing done and will pay out-of-pocket  4  RTO in 1 year and if any concerns  5  Patient scored a 15 on depression screen,  Reports she goes to a daily clinic for her mental health,  She reports she just got her  Insurance in [place] and will follow up with [facility] mental health services  Declines social work consultation  She reports that her depression is from the loss of her mother 2 years ago  6  She is to have follow-up appointment with her PCP    CC:  Annual Gynecologic Examination    HPI: Courtney Cruz is a 36 y o    obstetric history on file  who presents for annual gynecologic examination  History of  section, her 2 children live with other family members  She is a new patient to us, recently moved from [place]  Patient with medical history of diabetes, hypertension,  Bipolar,  PTSD, COPD,  and asthma  To have follow-up appointments her PCP  Her mother  from HIV, diabetes, heart disease and hypertension  patient has had a tubal    Her LMP was 2020 menses are regular, monthly, last 7 days   she is a current every day smoker- does not want quit smoking yet    She has the following concerns:   She desires STD testing, she states her partner was with other female partners and patient currently was with 2 male partners and 3 female partner  Reports she is  and wants to divorce her female spouse, but unable to get consent from her  Health Maintenance:    She wears her seatbelt routinely  She does perform irregular monthly self breast exams  She feels safe at home  Tobacco Cessation Counseling: Tobacco cessation counseling and education was provided  The patient is sincerely urged to quit consumption of tobacco  She is not ready to quit tobacco  The numerous health risks of tobacco consumption were discussed  If she decides to quit, there are a number of helpful adjunctive aids, and she can see me to discuss nicotine replacement therapy, chantix, or bupropion anytime in the future  BMI Counseling: Body mass index is 42 73 kg/m²  The BMI is above normal  Nutrition recommendations include consuming healthier snacks  Exercise recommendations include exercising 3-5 times per week  Past Medical History:   Diagnosis Date    Asthma     Bipolar 1 disorder ([facility] )     COPD (chronic obstructive pulmonary disease) ([facility] )     Depression     Diabetes mellitus ([place] [address] )     Hypertension     Psychiatric disorder     PTSD (post-traumatic stress disorder)     Tendonitis     L shoulder       No past surgical history on file  Past OB/Gyn History:  OB History    None       Pt does not have menstrual issues  History of sexually transmitted infection: Yes   trichomonas  History of abnormal pap smears: No      Patient is currently sexually active  bisexual   The current method of family planning is tubal ligation  No family history on file      Social History:  Social History     Socioeconomic History    Marital status: /Civil Union     Spouse name: Not on file    Number of children: Not on file    Years of education: Not on file    Highest education level: Not on file Occupational History    Not on file   Social Needs    Financial resource strain: Not on file    Food insecurity:     Worry: Not on file     Inability: Not on file    Transportation needs:     Medical: Not on file     Non-medical: Not on file   Tobacco Use    Smoking status: Current Every Day Smoker     Packs/day: 0 50    Smokeless tobacco: Never Used   Substance and Sexual Activity    Alcohol use: No    Drug use: Yes     Types: Cocaine     Comment: 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    Sexual activity: Never   Lifestyle    Physical activity:     Days per week: Not on file     Minutes per session: Not on file    Stress: Not on file   Relationships    Social connections:     Talks on phone: Not on file     Gets together: Not on file     Attends Rastafari service: Not on file     Active member of club or organization: Not on file     Attends meetings of clubs or organizations: Not on file     Relationship status: Not on file    Intimate partner violence:     Fear of current or ex partner: Not on file     Emotionally abused: Not on file     Physically abused: Not on file     Forced sexual activity: Not on file   Other Topics Concern    Not on file   Social History Narrative    Not on file     Presently lives with self  Patient is     Patient is currently unemployed     No Known Allergies      Current Outpatient Medications:     Dulaglutide (TRULICITY SC), Inject under the skin, Disp: , Rfl:     insulin detemir (LEVEMIR) 100 units/mL subcutaneous injection, Inject under the skin 2 (two) times a day, Disp: , Rfl:     insulin lispro (HumaLOG) 100 units/mL injection, Inject under the skin 2 (two) times a day, Disp: , Rfl:     ketorolac (TORADOL) 10 mg tablet, Take 1 tablet (10 mg total) by mouth every 6 (six) hours as needed for moderate pain, Disp: 20 tablet, Rfl: 0    ketorolac (TORADOL) 10 mg tablet, Take 1 tablet (10 mg total) by mouth every 6 (six) hours as needed for moderate pain, Disp: 12 tablet, Rfl: 0    LISINOPRIL PO, Take 20 mg by mouth, Disp: , Rfl:     metFORMIN (GLUCOPHAGE) 1000 MG tablet, Take 1,000 mg by mouth 2 (two) times a day with meals, Disp: , Rfl:     metoclopramide (REGLAN) 10 mg tablet, Take 1 tablet (10 mg total) by mouth 3 (three) times a day as needed (Nausea), Disp: 12 tablet, Rfl: 0    traZODone (DESYREL) 100 mg tablet, Take 200 mg by mouth daily at bedtime, Disp: , Rfl:     UNKNOWN TO PATIENT, , Disp: , Rfl:       Review of Systems  Constitutional :no fever, feels well, no tiredness, no recent weight gain or loss  ENT: no ear ache, no loss of hearing, no nosebleeds or nasal discharge, no sore throat or hoarseness  Cardiovascular: no complaints of slow or fast heart beat, no chest pain, no palpitations, no leg claudication or lower extremity edema  Respiratory: no complaints of shortness of shortness of breath, no FRIAS  Breasts:no complaints of breast pain, breast lump, or nipple discharge  Gastrointestinal: no complaints of abdominal pain, constipation, nausea, vomiting, or diarrhea or bloody stools  Genitourinary : no complaints of dysuria, incontinence, pelvic pain, no dysmenorrhea, vaginal discharge or abnormal vaginal bleeding and as noted in HPI  Musculoskeletal: no complaints of arthralgia, no myalgia, no joint swelling or stiffness, no limb pain or swelling  Integumentary: no complaints of skin rash or lesion, itching or dry skin  Neurological: no complaints of headache, no confusion, no numbness or tingling, no dizziness or fainting    Objective      There were no vitals taken for this visit    General:   appears stated age, cooperative, alert normal mood and affect   Neck: normal, supple,trachea midline, no masses   Heart: regular rate and rhythm, S1, S2 normal, no murmur, click, rub or gallop   Lungs: clear to auscultation bilaterally   Breasts: normal appearance, no masses or tenderness, Inspection negative, No nipple retraction or dimpling, No nipple discharge or bleeding, No axillary or supraclavicular adenopathy, Normal to palpation without dominant masses, Taught monthly breast self examination   Abdomen: soft, non-tender, without masses or organomegaly   Vulva: normal female genitalia, Bartholin's, Urethra, [place] normal   Vagina: normal vagina, no discharge, exudate, lesion, or erythema   Urethra: normal   Cervix: Normal, no discharge  PAP done  GCC done  Nontender  Uterus: normal size, contour, position, consistency, mobility, non-tender   Adnexa: no mass, fullness, tenderness   Lymphatic palpation of lymph nodes in neck, axilla, groin and/or other locations: no lymphadenopathy or masses noted   Skin normal skin turgor and no rashes     Psychiatric orientation to person, place, and time: normal  mood and affect: normal

## 2020-02-11 LAB
C TRACH DNA SPEC QL NAA+PROBE: NEGATIVE
HIV 1+2 AB+HIV1 P24 AG SERPL QL IA: NORMAL
HPV HR 12 DNA CVX QL NAA+PROBE: NEGATIVE
HPV16 DNA CVX QL NAA+PROBE: NEGATIVE
HPV18 DNA CVX QL NAA+PROBE: NEGATIVE
N GONORRHOEA DNA SPEC QL NAA+PROBE: NEGATIVE
RPR SER QL: NORMAL

## 2020-02-12 LAB
LAB AP GYN PRIMARY INTERPRETATION: NORMAL
Lab: NORMAL

## 2020-02-13 ENCOUNTER — TELEPHONE (OUTPATIENT)
Dept: OBGYN CLINIC | Facility: CLINIC | Age: 35
End: 2020-02-13

## 2020-02-13 NOTE — TELEPHONE ENCOUNTER
Called to inform patient of negative pap smear result and negative STD culture/bloodwork results  Patient did not answer, left message to call office

## 2020-07-30 ENCOUNTER — OFFICE VISIT (OUTPATIENT)
Dept: OBGYN CLINIC | Facility: CLINIC | Age: 35
End: 2020-07-30

## 2020-07-30 VITALS
HEIGHT: 61 IN | SYSTOLIC BLOOD PRESSURE: 162 MMHG | DIASTOLIC BLOOD PRESSURE: 108 MMHG | TEMPERATURE: 97.5 F | BODY MASS INDEX: 39.95 KG/M2 | HEART RATE: 102 BPM | WEIGHT: 211.6 LBS

## 2020-07-30 DIAGNOSIS — N89.8 VAGINAL DISCHARGE: Primary | ICD-10-CM

## 2020-07-30 DIAGNOSIS — B37.3 VAGINAL YEAST INFECTION: ICD-10-CM

## 2020-07-30 LAB
BV WHIFF TEST VAG QL: ABNORMAL
CLUE CELLS SPEC QL WET PREP: ABNORMAL
SL AMB POCT WET MOUNT: ABNORMAL
T VAGINALIS VAG QL WET PREP: ABNORMAL
YEAST VAG QL WET PREP: ABNORMAL

## 2020-07-30 PROCEDURE — 99213 OFFICE O/P EST LOW 20 MIN: CPT | Performed by: NURSE PRACTITIONER

## 2020-07-30 PROCEDURE — 87210 SMEAR WET MOUNT SALINE/INK: CPT | Performed by: NURSE PRACTITIONER

## 2020-07-30 RX ORDER — FLUCONAZOLE 150 MG/1
150 TABLET ORAL ONCE
Qty: 1 TABLET | Refills: 0 | Status: SHIPPED | OUTPATIENT
Start: 2020-07-30 | End: 2020-07-30

## 2020-07-30 NOTE — PROGRESS NOTES
Assessment/Plan:    No problem-specific Assessment & Plan notes found for this encounter  annual due 02/10/2021     Diagnoses and all orders for this visit:    Vaginal discharge  -     POCT wet mount  -     fluconazole (DIFLUCAN) 150 mg tablet; Take 1 tablet (150 mg total) by mouth once for 1 dose    Vaginal yeast infection  -     fluconazole (DIFLUCAN) 150 mg tablet; Take 1 tablet (150 mg total) by mouth once for 1 dose   call symptoms not improved     information provided for free STD testing at [facility]     blood pressure increased today in office- recommend for patient to go to the emergency room if symptomatic, pt declines at this time,   phone number given for [facility] which she states she will establish a PCP        Subjective:      Patient ID: Jannie Andres is a 36 y o  female  HPI  23-year-old  here with complaints of vaginal discharge x1 month  She complains of a white thick discharge that has a fishy smell  She states that she frequently douches with soap and water  She is a diabetic, not currently on insulin and does not routinely check her blood sugar  She complains of urinary urgency, frequency, and post void residual  She states that she had sexual relations with her ex-boyfriend one month ago and that she noticed a foul odor and discharge from her partner at that time  She denies fevers, chills, flank pain   history of tubal    The following portions of the patient's history were reviewed and updated as appropriate: allergies, current medications, past family history, past medical history, past social history, past surgical history and problem list     Review of Systems   Respiratory: Negative  Cardiovascular: Negative  Genitourinary: Positive for vaginal discharge  Negative for dysuria, frequency, genital sores and pelvic pain  Neurological: Positive for headaches  Negative for dizziness           Objective:      BP (!) 162/108 (BP Location: Left arm, Patient Position: Sitting, Cuff Size: Large)   Pulse 102   Temp 97 5 °F (36 4 °C) (Temporal)   Ht 5' 1" (1 549 m)   Wt 96 kg (211 lb 9 6 oz)   BMI 39 98 kg/m²          Physical Exam   Constitutional: She is oriented to person, place, and time  She appears well-nourished  Cardiovascular: Normal rate, regular rhythm and normal heart sounds  Pulmonary/Chest: Effort normal and breath sounds normal    Abdominal: Soft  There is no tenderness  Neurological: She is alert and oriented to person, place, and time  Skin: Skin is warm and dry  Psychiatric: She has a normal mood and affect   Her behavior is normal       External genitalia- no lesions   vagina- small amount thick white discharge   cervix- negative CMT   uterus- anteverted, some tenderness with exam   adnexa- no masses, nontender     Wet mount KOH- positive yeast buds,  Negative BV negative for trich

## 2020-07-30 NOTE — PATIENT INSTRUCTIONS
Vulvovaginal Candidiasis   AMBULATORY CARE:   Vulvovaginal candidiasis,  or yeast infection, is a common vaginal infection  Vulvovaginal candidiasis is caused by a fungus, or yeast-like germ  Fungi are normally found in your vagina  When there are too many fungi, it can cause an infection  Seek care immediately if:   · You have fever and chills  · You are bleeding from your vagina and it is not your monthly period  · You develop abdominal or pelvic pain  Contact your healthcare provider if:   · Your signs and symptoms get worse, even after treatment  · You have questions or concerns about your condition or care  Signs and symptoms:   · Thick, white, cheese-like discharge from your vagina    · Itching, swelling, or redness in your vagina    · Burning when you urinate  Treatment for vulvovaginal candidiasis  includes medicines to treat the fungal infection and decrease inflammation  The medicine may be a pill, topical cream, or vaginal suppository  Manage your symptoms:   · Wear cotton underwear  · Keep the vaginal area clean and dry  · Do not have sex until your symptoms are gone  · Do not douche  · Do not use feminine hygiene sprays, powders, or bubble bath  Prevent another infection:   · Take showers instead of baths  · Eat yogurt  · Limit the amount of alcohol you drink  · Control your blood sugar if you are diabetic  · Limit your time in hot tubs  Follow up with your healthcare provider as directed:  Write down your questions so you remember to ask them during your visits  © 2017 [address] Information is for End User's use only and may not be sold, redistributed or otherwise used for commercial purposes  All illustrations and images included in CareNotes® are the copyrighted property of [employer]  or [employer]  The above information is an  only   It is not intended as medical advice for individual conditions or treatments  Talk to your doctor, nurse or pharmacist before following any medical regimen to see if it is safe and effective for you

## 2020-08-06 ENCOUNTER — APPOINTMENT (EMERGENCY)
Dept: CT IMAGING | Facility: HOSPITAL | Age: 35
DRG: 103 | End: 2020-08-06
Payer: MEDICARE

## 2020-08-06 ENCOUNTER — HOSPITAL ENCOUNTER (INPATIENT)
Facility: HOSPITAL | Age: 35
LOS: 1 days | Discharge: HOME | DRG: 103 | End: 2020-08-08
Attending: EMERGENCY MEDICINE | Admitting: HOSPITALIST
Payer: MEDICARE

## 2020-08-06 DIAGNOSIS — R51.9 HEADACHE: ICD-10-CM

## 2020-08-06 DIAGNOSIS — R51.9 INTRACTABLE HEADACHE: Primary | ICD-10-CM

## 2020-08-06 DIAGNOSIS — S09.90XA HEAD INJURY: ICD-10-CM

## 2020-08-06 DIAGNOSIS — F31.9 BIPOLAR DISORDER (HCC): ICD-10-CM

## 2020-08-06 DIAGNOSIS — R20.2 PARESTHESIA: ICD-10-CM

## 2020-08-06 DIAGNOSIS — R94.31 ABNORMAL EKG: ICD-10-CM

## 2020-08-06 DIAGNOSIS — R55 SYNCOPE: ICD-10-CM

## 2020-08-06 LAB
ALBUMIN SERPL BCP-MCNC: 3.4 G/DL (ref 3.5–5)
ALP SERPL-CCNC: 114 U/L (ref 46–116)
ALT SERPL W P-5'-P-CCNC: 24 U/L (ref 12–78)
ANION GAP SERPL CALCULATED.3IONS-SCNC: 8 MMOL/L (ref 4–13)
APTT PPP: 28 SECONDS (ref 23–37)
AST SERPL W P-5'-P-CCNC: 15 U/L (ref 5–45)
BASOPHILS # BLD AUTO: 0.03 THOUSANDS/ΜL (ref 0–0.1)
BASOPHILS NFR BLD AUTO: 0 % (ref 0–1)
BILIRUB SERPL-MCNC: 0.22 MG/DL (ref 0.2–1)
BILIRUB UR QL STRIP: NEGATIVE
BUN SERPL-MCNC: 16 MG/DL (ref 5–25)
CALCIUM SERPL-MCNC: 8.9 MG/DL (ref 8.3–10.1)
CHLORIDE SERPL-SCNC: 99 MMOL/L (ref 100–108)
CLARITY UR: CLEAR
CO2 SERPL-SCNC: 29 MMOL/L (ref 21–32)
COLOR UR: YELLOW
CREAT SERPL-MCNC: 0.64 MG/DL (ref 0.6–1.3)
EOSINOPHIL # BLD AUTO: 0.13 THOUSAND/ΜL (ref 0–0.61)
EOSINOPHIL NFR BLD AUTO: 2 % (ref 0–6)
ERYTHROCYTE [DISTWIDTH] IN BLOOD BY AUTOMATED COUNT: 13.4 % (ref 11.6–15.1)
GFR SERPL CREATININE-BSD FRML MDRD: 136 ML/MIN/1.73SQ M
GLUCOSE SERPL-MCNC: 236 MG/DL (ref 65–140)
GLUCOSE SERPL-MCNC: 261 MG/DL (ref 65–140)
GLUCOSE UR STRIP-MCNC: ABNORMAL MG/DL
HCT VFR BLD AUTO: 41.7 % (ref 34.8–46.1)
HGB BLD-MCNC: 14.8 G/DL (ref 11.5–15.4)
HGB UR QL STRIP.AUTO: ABNORMAL
IMM GRANULOCYTES # BLD AUTO: 0.02 THOUSAND/UL (ref 0–0.2)
IMM GRANULOCYTES NFR BLD AUTO: 0 % (ref 0–2)
INR PPP: 0.93 (ref 0.84–1.19)
KETONES UR STRIP-MCNC: NEGATIVE MG/DL
LEUKOCYTE ESTERASE UR QL STRIP: NEGATIVE
LYMPHOCYTES # BLD AUTO: 2.62 THOUSANDS/ΜL (ref 0.6–4.47)
LYMPHOCYTES NFR BLD AUTO: 36 % (ref 14–44)
MCH RBC QN AUTO: 27.6 PG (ref 26.8–34.3)
MCHC RBC AUTO-ENTMCNC: 35.5 G/DL (ref 31.4–37.4)
MCV RBC AUTO: 78 FL (ref 82–98)
MONOCYTES # BLD AUTO: 0.46 THOUSAND/ΜL (ref 0.17–1.22)
MONOCYTES NFR BLD AUTO: 6 % (ref 4–12)
NEUTROPHILS # BLD AUTO: 4.02 THOUSANDS/ΜL (ref 1.85–7.62)
NEUTS SEG NFR BLD AUTO: 56 % (ref 43–75)
NITRITE UR QL STRIP: NEGATIVE
NRBC BLD AUTO-RTO: 0 /100 WBCS
PH UR STRIP.AUTO: 7.5 [PH] (ref 4.5–8)
PLATELET # BLD AUTO: 321 THOUSANDS/UL (ref 149–390)
PMV BLD AUTO: 10.9 FL (ref 8.9–12.7)
POTASSIUM SERPL-SCNC: 3.9 MMOL/L (ref 3.5–5.3)
PROT SERPL-MCNC: 7.5 G/DL (ref 6.4–8.2)
PROT UR STRIP-MCNC: NEGATIVE MG/DL
PROTHROMBIN TIME: 12.6 SECONDS (ref 11.6–14.5)
RBC # BLD AUTO: 5.37 MILLION/UL (ref 3.81–5.12)
SODIUM SERPL-SCNC: 136 MMOL/L (ref 136–145)
SP GR UR STRIP.AUTO: 1.02 (ref 1–1.03)
TROPONIN I SERPL-MCNC: <0.02 NG/ML
UROBILINOGEN UR QL STRIP.AUTO: 0.2 E.U./DL
WBC # BLD AUTO: 7.28 THOUSAND/UL (ref 4.31–10.16)

## 2020-08-06 PROCEDURE — 80053 COMPREHEN METABOLIC PANEL: CPT | Performed by: EMERGENCY MEDICINE

## 2020-08-06 PROCEDURE — G1004 CDSM NDSC: HCPCS

## 2020-08-06 PROCEDURE — 99285 EMERGENCY DEPT VISIT HI MDM: CPT

## 2020-08-06 PROCEDURE — 96375 TX/PRO/DX INJ NEW DRUG ADDON: CPT

## 2020-08-06 PROCEDURE — 99285 EMERGENCY DEPT VISIT HI MDM: CPT | Performed by: EMERGENCY MEDICINE

## 2020-08-06 PROCEDURE — 72125 CT NECK SPINE W/O DYE: CPT

## 2020-08-06 PROCEDURE — 85025 COMPLETE CBC W/AUTO DIFF WBC: CPT | Performed by: EMERGENCY MEDICINE

## 2020-08-06 PROCEDURE — 93005 ELECTROCARDIOGRAM TRACING: CPT

## 2020-08-06 PROCEDURE — 85730 THROMBOPLASTIN TIME PARTIAL: CPT | Performed by: EMERGENCY MEDICINE

## 2020-08-06 PROCEDURE — 84484 ASSAY OF TROPONIN QUANT: CPT | Performed by: EMERGENCY MEDICINE

## 2020-08-06 PROCEDURE — 82948 REAGENT STRIP/BLOOD GLUCOSE: CPT

## 2020-08-06 PROCEDURE — 96365 THER/PROPH/DIAG IV INF INIT: CPT

## 2020-08-06 PROCEDURE — 36415 COLL VENOUS BLD VENIPUNCTURE: CPT | Performed by: EMERGENCY MEDICINE

## 2020-08-06 PROCEDURE — 70450 CT HEAD/BRAIN W/O DYE: CPT

## 2020-08-06 PROCEDURE — 83036 HEMOGLOBIN GLYCOSYLATED A1C: CPT | Performed by: NURSE PRACTITIONER

## 2020-08-06 PROCEDURE — 96361 HYDRATE IV INFUSION ADD-ON: CPT

## 2020-08-06 PROCEDURE — 85610 PROTHROMBIN TIME: CPT | Performed by: EMERGENCY MEDICINE

## 2020-08-06 RX ORDER — DIPHENHYDRAMINE HYDROCHLORIDE 50 MG/ML
25 INJECTION INTRAMUSCULAR; INTRAVENOUS ONCE
Status: COMPLETED | OUTPATIENT
Start: 2020-08-06 | End: 2020-08-06

## 2020-08-06 RX ORDER — KETOROLAC TROMETHAMINE 30 MG/ML
10 INJECTION, SOLUTION INTRAMUSCULAR; INTRAVENOUS ONCE
Status: COMPLETED | OUTPATIENT
Start: 2020-08-06 | End: 2020-08-06

## 2020-08-06 RX ORDER — METOCLOPRAMIDE HYDROCHLORIDE 5 MG/ML
10 INJECTION INTRAMUSCULAR; INTRAVENOUS ONCE
Status: COMPLETED | OUTPATIENT
Start: 2020-08-06 | End: 2020-08-06

## 2020-08-06 RX ORDER — MAGNESIUM SULFATE HEPTAHYDRATE 40 MG/ML
2 INJECTION, SOLUTION INTRAVENOUS ONCE
Status: COMPLETED | OUTPATIENT
Start: 2020-08-06 | End: 2020-08-07

## 2020-08-06 RX ORDER — ONDANSETRON 2 MG/ML
4 INJECTION INTRAMUSCULAR; INTRAVENOUS ONCE
Status: COMPLETED | OUTPATIENT
Start: 2020-08-06 | End: 2020-08-06

## 2020-08-06 RX ORDER — FENTANYL CITRATE 50 UG/ML
50 INJECTION, SOLUTION INTRAMUSCULAR; INTRAVENOUS ONCE
Status: COMPLETED | OUTPATIENT
Start: 2020-08-06 | End: 2020-08-06

## 2020-08-06 RX ADMIN — KETOROLAC TROMETHAMINE 9.9 MG: 30 INJECTION, SOLUTION INTRAMUSCULAR at 23:39

## 2020-08-06 RX ADMIN — METOCLOPRAMIDE HYDROCHLORIDE 10 MG: 5 INJECTION INTRAMUSCULAR; INTRAVENOUS at 23:40

## 2020-08-06 RX ADMIN — SODIUM CHLORIDE 1000 ML: 0.9 INJECTION, SOLUTION INTRAVENOUS at 21:41

## 2020-08-06 RX ADMIN — ONDANSETRON 4 MG: 2 INJECTION INTRAMUSCULAR; INTRAVENOUS at 21:40

## 2020-08-06 RX ADMIN — MAGNESIUM SULFATE IN WATER 2 G: 40 INJECTION, SOLUTION INTRAVENOUS at 23:44

## 2020-08-06 RX ADMIN — FENTANYL CITRATE 50 MCG: 50 INJECTION INTRAMUSCULAR; INTRAVENOUS at 21:40

## 2020-08-06 RX ADMIN — DIPHENHYDRAMINE HYDROCHLORIDE 25 MG: 50 INJECTION, SOLUTION INTRAMUSCULAR; INTRAVENOUS at 23:37

## 2020-08-07 ENCOUNTER — APPOINTMENT (OUTPATIENT)
Dept: NON INVASIVE DIAGNOSTICS | Facility: HOSPITAL | Age: 35
DRG: 103 | End: 2020-08-07
Payer: MEDICARE

## 2020-08-07 ENCOUNTER — APPOINTMENT (OUTPATIENT)
Dept: MRI IMAGING | Facility: HOSPITAL | Age: 35
DRG: 103 | End: 2020-08-07
Payer: MEDICARE

## 2020-08-07 PROBLEM — R55 SYNCOPE: Status: ACTIVE | Noted: 2020-08-07

## 2020-08-07 PROBLEM — IMO0002 UNCONTROLLED TYPE 2 DIABETES MELLITUS: Status: ACTIVE | Noted: 2020-08-07

## 2020-08-07 PROBLEM — J44.9 COPD (CHRONIC OBSTRUCTIVE PULMONARY DISEASE) (HCC): Status: ACTIVE | Noted: 2020-08-07

## 2020-08-07 PROBLEM — F31.9 BIPOLAR DISORDER (HCC): Status: ACTIVE | Noted: 2020-08-07

## 2020-08-07 PROBLEM — F17.200 NICOTINE DEPENDENCE: Status: ACTIVE | Noted: 2020-08-07

## 2020-08-07 PROBLEM — I16.0 HYPERTENSIVE URGENCY: Status: ACTIVE | Noted: 2020-08-07

## 2020-08-07 PROBLEM — R29.90 STROKE-LIKE SYMPTOMS: Status: ACTIVE | Noted: 2020-08-07

## 2020-08-07 PROBLEM — I10 HYPERTENSION: Status: ACTIVE | Noted: 2020-08-07

## 2020-08-07 PROBLEM — R51.9 HEADACHE: Status: ACTIVE | Noted: 2020-08-07

## 2020-08-07 PROBLEM — R20.2 PARESTHESIA: Status: ACTIVE | Noted: 2020-08-07

## 2020-08-07 LAB
AMPHETAMINES SERPL QL SCN: NEGATIVE
ANION GAP SERPL CALCULATED.3IONS-SCNC: 8 MMOL/L (ref 4–13)
ATRIAL RATE: 65 BPM
BACTERIA UR QL AUTO: ABNORMAL /HPF
BARBITURATES UR QL: NEGATIVE
BENZODIAZ UR QL: NEGATIVE
BUN SERPL-MCNC: 23 MG/DL (ref 5–25)
CALCIUM SERPL-MCNC: 8.4 MG/DL (ref 8.3–10.1)
CHLORIDE SERPL-SCNC: 102 MMOL/L (ref 100–108)
CHOLEST SERPL-MCNC: 185 MG/DL (ref 50–200)
CO2 SERPL-SCNC: 25 MMOL/L (ref 21–32)
COCAINE UR QL: POSITIVE
CREAT SERPL-MCNC: 0.93 MG/DL (ref 0.6–1.3)
ERYTHROCYTE [DISTWIDTH] IN BLOOD BY AUTOMATED COUNT: 13.5 % (ref 11.6–15.1)
EST. AVERAGE GLUCOSE BLD GHB EST-MCNC: 229 MG/DL
EST. AVERAGE GLUCOSE BLD GHB EST-MCNC: 232 MG/DL
GFR SERPL CREATININE-BSD FRML MDRD: 93 ML/MIN/1.73SQ M
GLUCOSE SERPL-MCNC: 219 MG/DL (ref 65–140)
GLUCOSE SERPL-MCNC: 228 MG/DL (ref 65–140)
GLUCOSE SERPL-MCNC: 250 MG/DL (ref 65–140)
GLUCOSE SERPL-MCNC: 261 MG/DL (ref 65–140)
GLUCOSE SERPL-MCNC: 463 MG/DL (ref 65–140)
HBA1C MFR BLD: 9.6 %
HBA1C MFR BLD: 9.7 %
HCT VFR BLD AUTO: 37 % (ref 34.8–46.1)
HDLC SERPL-MCNC: 46 MG/DL
HGB BLD-MCNC: 13.2 G/DL (ref 11.5–15.4)
LDLC SERPL CALC-MCNC: 111 MG/DL (ref 0–100)
MCH RBC QN AUTO: 28.2 PG (ref 26.8–34.3)
MCHC RBC AUTO-ENTMCNC: 35.7 G/DL (ref 31.4–37.4)
MCV RBC AUTO: 79 FL (ref 82–98)
METHADONE UR QL: NEGATIVE
NON-SQ EPI CELLS URNS QL MICRO: ABNORMAL /HPF
OPIATES UR QL SCN: NEGATIVE
OXYCODONE+OXYMORPHONE UR QL SCN: NEGATIVE
P AXIS: 51 DEGREES
PCP UR QL: NEGATIVE
PLATELET # BLD AUTO: 306 THOUSANDS/UL (ref 149–390)
PMV BLD AUTO: 10.8 FL (ref 8.9–12.7)
POTASSIUM SERPL-SCNC: 4.1 MMOL/L (ref 3.5–5.3)
PR INTERVAL: 144 MS
QRS AXIS: 70 DEGREES
QRSD INTERVAL: 74 MS
QT INTERVAL: 386 MS
QTC INTERVAL: 386 MS
RBC # BLD AUTO: 4.68 MILLION/UL (ref 3.81–5.12)
RBC #/AREA URNS AUTO: ABNORMAL /HPF
SODIUM SERPL-SCNC: 135 MMOL/L (ref 136–145)
T WAVE AXIS: 55 DEGREES
THC UR QL: POSITIVE
TRIGL SERPL-MCNC: 139 MG/DL
TROPONIN I SERPL-MCNC: <0.02 NG/ML
TROPONIN I SERPL-MCNC: <0.02 NG/ML
VENTRICULAR RATE: 60 BPM
WBC # BLD AUTO: 12.44 THOUSAND/UL (ref 4.31–10.16)
WBC #/AREA URNS AUTO: ABNORMAL /HPF

## 2020-08-07 PROCEDURE — 93010 ELECTROCARDIOGRAM REPORT: CPT | Performed by: INTERNAL MEDICINE

## 2020-08-07 PROCEDURE — 83036 HEMOGLOBIN GLYCOSYLATED A1C: CPT | Performed by: NURSE PRACTITIONER

## 2020-08-07 PROCEDURE — 84484 ASSAY OF TROPONIN QUANT: CPT | Performed by: NURSE PRACTITIONER

## 2020-08-07 PROCEDURE — 80307 DRUG TEST PRSMV CHEM ANLYZR: CPT | Performed by: NURSE PRACTITIONER

## 2020-08-07 PROCEDURE — 80061 LIPID PANEL: CPT | Performed by: NURSE PRACTITIONER

## 2020-08-07 PROCEDURE — 99220 PR INITIAL OBSERVATION CARE/DAY 70 MINUTES: CPT | Performed by: NURSE PRACTITIONER

## 2020-08-07 PROCEDURE — 93306 TTE W/DOPPLER COMPLETE: CPT | Performed by: INTERNAL MEDICINE

## 2020-08-07 PROCEDURE — 96367 TX/PROPH/DG ADDL SEQ IV INF: CPT

## 2020-08-07 PROCEDURE — 81001 URINALYSIS AUTO W/SCOPE: CPT

## 2020-08-07 PROCEDURE — 99221 1ST HOSP IP/OBS SF/LOW 40: CPT | Performed by: PSYCHIATRY & NEUROLOGY

## 2020-08-07 PROCEDURE — 93306 TTE W/DOPPLER COMPLETE: CPT

## 2020-08-07 PROCEDURE — 82948 REAGENT STRIP/BLOOD GLUCOSE: CPT

## 2020-08-07 PROCEDURE — 80048 BASIC METABOLIC PNL TOTAL CA: CPT | Performed by: NURSE PRACTITIONER

## 2020-08-07 PROCEDURE — 99223 1ST HOSP IP/OBS HIGH 75: CPT | Performed by: PSYCHIATRY & NEUROLOGY

## 2020-08-07 PROCEDURE — 85027 COMPLETE CBC AUTOMATED: CPT | Performed by: NURSE PRACTITIONER

## 2020-08-07 RX ORDER — DIPHENHYDRAMINE HYDROCHLORIDE 50 MG/ML
25 INJECTION INTRAMUSCULAR; INTRAVENOUS EVERY 8 HOURS
Status: DISCONTINUED | OUTPATIENT
Start: 2020-08-07 | End: 2020-08-08 | Stop reason: HOSPADM

## 2020-08-07 RX ORDER — ACETAMINOPHEN 325 MG/1
650 TABLET ORAL EVERY 6 HOURS PRN
Status: DISCONTINUED | OUTPATIENT
Start: 2020-08-07 | End: 2020-08-08 | Stop reason: HOSPADM

## 2020-08-07 RX ORDER — NICOTINE 21 MG/24HR
1 PATCH, TRANSDERMAL 24 HOURS TRANSDERMAL DAILY
Status: DISCONTINUED | OUTPATIENT
Start: 2020-08-07 | End: 2020-08-08 | Stop reason: HOSPADM

## 2020-08-07 RX ORDER — FLUTICASONE FUROATE AND VILANTEROL 100; 25 UG/1; UG/1
1 POWDER RESPIRATORY (INHALATION) DAILY
Status: DISCONTINUED | OUTPATIENT
Start: 2020-08-07 | End: 2020-08-08 | Stop reason: HOSPADM

## 2020-08-07 RX ORDER — QUETIAPINE FUMARATE 100 MG/1
300 TABLET, FILM COATED ORAL
Status: DISCONTINUED | OUTPATIENT
Start: 2020-08-07 | End: 2020-08-07

## 2020-08-07 RX ORDER — FLUCONAZOLE 100 MG/1
200 TABLET ORAL DAILY
Status: DISCONTINUED | OUTPATIENT
Start: 2020-08-07 | End: 2020-08-08 | Stop reason: HOSPADM

## 2020-08-07 RX ORDER — KETOROLAC TROMETHAMINE 30 MG/ML
15 INJECTION, SOLUTION INTRAMUSCULAR; INTRAVENOUS EVERY 6 HOURS PRN
Status: DISCONTINUED | OUTPATIENT
Start: 2020-08-07 | End: 2020-08-08 | Stop reason: HOSPADM

## 2020-08-07 RX ORDER — MAGNESIUM SULFATE HEPTAHYDRATE 40 MG/ML
2 INJECTION, SOLUTION INTRAVENOUS ONCE
Status: DISCONTINUED | OUTPATIENT
Start: 2020-08-07 | End: 2020-08-08 | Stop reason: HOSPADM

## 2020-08-07 RX ORDER — ACETAMINOPHEN 325 MG/1
975 TABLET ORAL ONCE
Status: COMPLETED | OUTPATIENT
Start: 2020-08-07 | End: 2020-08-07

## 2020-08-07 RX ORDER — KETOROLAC TROMETHAMINE 30 MG/ML
30 INJECTION, SOLUTION INTRAMUSCULAR; INTRAVENOUS 3 TIMES DAILY
Status: DISCONTINUED | OUTPATIENT
Start: 2020-08-07 | End: 2020-08-08 | Stop reason: HOSPADM

## 2020-08-07 RX ORDER — ASPIRIN 81 MG/1
81 TABLET, CHEWABLE ORAL DAILY
Status: DISCONTINUED | OUTPATIENT
Start: 2020-08-07 | End: 2020-08-08 | Stop reason: HOSPADM

## 2020-08-07 RX ORDER — ALBUTEROL SULFATE 90 UG/1
2 AEROSOL, METERED RESPIRATORY (INHALATION) EVERY 6 HOURS PRN
Status: DISCONTINUED | OUTPATIENT
Start: 2020-08-07 | End: 2020-08-08 | Stop reason: HOSPADM

## 2020-08-07 RX ORDER — QUETIAPINE FUMARATE 100 MG/1
100 TABLET, FILM COATED ORAL
Status: DISCONTINUED | OUTPATIENT
Start: 2020-08-07 | End: 2020-08-08 | Stop reason: HOSPADM

## 2020-08-07 RX ORDER — ATORVASTATIN CALCIUM 40 MG/1
40 TABLET, FILM COATED ORAL EVERY EVENING
Status: DISCONTINUED | OUTPATIENT
Start: 2020-08-07 | End: 2020-08-08 | Stop reason: HOSPADM

## 2020-08-07 RX ORDER — METOCLOPRAMIDE HYDROCHLORIDE 5 MG/ML
10 INJECTION INTRAMUSCULAR; INTRAVENOUS 3 TIMES DAILY
Status: DISCONTINUED | OUTPATIENT
Start: 2020-08-07 | End: 2020-08-08 | Stop reason: HOSPADM

## 2020-08-07 RX ORDER — LISINOPRIL 20 MG/1
20 TABLET ORAL DAILY
Status: DISCONTINUED | OUTPATIENT
Start: 2020-08-07 | End: 2020-08-08 | Stop reason: HOSPADM

## 2020-08-07 RX ORDER — MAGNESIUM SULFATE 1 G/100ML
1 INJECTION INTRAVENOUS 2 TIMES DAILY
Status: DISCONTINUED | OUTPATIENT
Start: 2020-08-07 | End: 2020-08-08 | Stop reason: HOSPADM

## 2020-08-07 RX ADMIN — INSULIN DETEMIR 30 UNITS: 100 INJECTION, SOLUTION SUBCUTANEOUS at 22:08

## 2020-08-07 RX ADMIN — FLUCONAZOLE 200 MG: 100 TABLET ORAL at 14:45

## 2020-08-07 RX ADMIN — INSULIN DETEMIR 30 UNITS: 100 INJECTION, SOLUTION SUBCUTANEOUS at 08:22

## 2020-08-07 RX ADMIN — QUETIAPINE FUMARATE 100 MG: 100 TABLET ORAL at 22:07

## 2020-08-07 RX ADMIN — LISINOPRIL 20 MG: 20 TABLET ORAL at 08:24

## 2020-08-07 RX ADMIN — ASPIRIN 81 MG 81 MG: 81 TABLET ORAL at 08:24

## 2020-08-07 RX ADMIN — ACETAMINOPHEN 975 MG: 325 TABLET, FILM COATED ORAL at 00:55

## 2020-08-07 RX ADMIN — DIPHENHYDRAMINE HYDROCHLORIDE 25 MG: 50 INJECTION, SOLUTION INTRAMUSCULAR; INTRAVENOUS at 17:16

## 2020-08-07 RX ADMIN — INSULIN LISPRO 2 UNITS: 100 INJECTION, SOLUTION INTRAVENOUS; SUBCUTANEOUS at 14:46

## 2020-08-07 RX ADMIN — KETOROLAC TROMETHAMINE 30 MG: 30 INJECTION, SOLUTION INTRAMUSCULAR at 17:16

## 2020-08-07 RX ADMIN — ACETAMINOPHEN 650 MG: 325 TABLET, FILM COATED ORAL at 05:50

## 2020-08-07 RX ADMIN — FLUTICASONE FUROATE AND VILANTEROL TRIFENATATE 1 PUFF: 100; 25 POWDER RESPIRATORY (INHALATION) at 08:23

## 2020-08-07 RX ADMIN — KETOROLAC TROMETHAMINE 15 MG: 30 INJECTION, SOLUTION INTRAMUSCULAR at 14:45

## 2020-08-07 RX ADMIN — VALPROATE SODIUM 1000 MG: 100 INJECTION, SOLUTION INTRAVENOUS at 00:59

## 2020-08-07 RX ADMIN — INSULIN LISPRO 3 UNITS: 100 INJECTION, SOLUTION INTRAVENOUS; SUBCUTANEOUS at 08:23

## 2020-08-07 RX ADMIN — INSULIN LISPRO 3 UNITS: 100 INJECTION, SOLUTION INTRAVENOUS; SUBCUTANEOUS at 17:18

## 2020-08-07 RX ADMIN — METOCLOPRAMIDE HYDROCHLORIDE 10 MG: 5 INJECTION INTRAMUSCULAR; INTRAVENOUS at 17:16

## 2020-08-07 RX ADMIN — ATORVASTATIN CALCIUM 40 MG: 40 TABLET, FILM COATED ORAL at 17:17

## 2020-08-07 RX ADMIN — INSULIN LISPRO 2 UNITS: 100 INJECTION, SOLUTION INTRAVENOUS; SUBCUTANEOUS at 23:10

## 2020-08-07 RX ADMIN — NICOTINE 1 PATCH: 14 PATCH TRANSDERMAL at 08:23

## 2020-08-07 RX ADMIN — INSULIN LISPRO 5 UNITS: 100 INJECTION, SOLUTION INTRAVENOUS; SUBCUTANEOUS at 22:10

## 2020-08-07 RX ADMIN — KETOROLAC TROMETHAMINE 15 MG: 30 INJECTION, SOLUTION INTRAMUSCULAR at 05:32

## 2020-08-07 NOTE — ASSESSMENT & PLAN NOTE
· Home regimen of Advair and Proventil; however, patient has not been taking her home medications  · Will order Breo and albuterol while inpatient

## 2020-08-07 NOTE — ASSESSMENT & PLAN NOTE
· Patient reports that on 08/05/2020 around 2100 she passed out after having a verbal argument  She reports that she felt very anxious and paranoid at the time  Patient reports that she struck the right side of her head on a curb off the ground  She reports that she woke up on 08/06/2020 with a headache  She presents to the ER for worsening headache  · Etiology: Vasovagal versus psychiatric source versus TIA/CVA versus seizure  · Obtain urine drug screen  · Troponin negative  · Telemetry monitoring  · Obtain echo  · Stroke pathway  · Neurology consult

## 2020-08-07 NOTE — ASSESSMENT & PLAN NOTE
· Patient reports she has only been taking her Seroquel dose daily  · Patient is post failed Lamictal, trazodone and Seroquel  · Psychiatry consult

## 2020-08-07 NOTE — H&P
[facility]    H&P- Jermaine Bebo 1985, 36 y o  female MRN: [id number]    Unit/Bed#: S -01 Encounter: [id number]    Primary Care Provider: Scarlet Taveras DO   Date and time admitted to hospital: 8/6/2020  8:44 PM      * Headache  Assessment & Plan  · Complaints of headache after syncopal episode on 08/05/2020 which involved hitting the right side of her head  She reports double vision, right-sided paresthesia in right hand an right lower leg that started yesterday  · Patient reports history of migraines; however, patient reports that this headache feels different than previous migraines  · Patient received headache cocktail in ER without little relief  · Etiology:  Concussion related to fall versus hypertensive urgency versus migraine versus TIA/CVA  · CT of head: "No acute intracranial abnormality    · CT cervical spine: "No acute cervical spine fracture or traumatic malalignment    · Pain control  · Blood pressure control  · Stroke pathway  · Neurology consult  Syncope  Assessment & Plan  · Patient reports that on 08/05/2020 around 2100 she passed out after having a verbal argument  She reports that she felt very anxious and paranoid at the time  Patient reports that she struck the right side of her head on a curb off the ground  She reports that she woke up on 08/06/2020 with a headache  She presents to the ER for worsening headache  · Etiology: Vasovagal versus psychiatric source versus TIA/CVA versus seizure  · Obtain urine drug screen  · Troponin negative  · Telemetry monitoring  · Obtain echo  · Stroke pathway  · Neurology consult  Paresthesia  Assessment & Plan  · Patient reports varices in right hand and right lower leg that started today  · Etiology:  TIA/CVA versus diabetic neuropathy  · Neuro checks per stroke pathway protocol  · Neurology consult      Bipolar disorder [facility])  Assessment & Plan  · Patient reports she has only been taking her Seroquel dose daily  · Patient is post failed Lamictal, trazodone and Seroquel  · Psychiatry consult  Hypertensive urgency  Assessment & Plan  · Patient reports that she has not been taking any of her home medications except for her Seroquel  She reports that due to insurance and inability to obtain medications she has not taken her medications for several months  · Present on admission, blood pressure of 195/102  · Patient did not receive any antihypertensives in the ER  · Most recent blood pressure 160/81  · Restart home regimen of lisinopril  Uncontrolled type 2 diabetes mellitus ([facility] [address] )  Assessment & Plan  Lab Results   Component Value Date    HGBA1C 9 2 (H) 10/24/2018       Recent Labs     08/06/20  2147   POCGLU 236*       Blood Sugar Average: Last 72 hrs:  (P) 236   · Patient reports that she has not been taking her insulin or oral antidiabetic medications due to insurance issues and inability to obtain medications  · Obtain A1c  Consider endocrinology consult pending A1c results  · Patient's home regimen is supposed to be Levemir 30 units b i d , Trulicity and metformin  · Q i d  Accu-Cheks  · Restart Levemir 30 units b i d  And sliding scale insulin while inpatient  · Hypoglycemia protocol  COPD (chronic obstructive pulmonary disease) (HCC)  Assessment & Plan  · Home regimen of Advair and Proventil; however, patient has not been taking her home medications  · Will order Breo and albuterol while inpatient  Nicotine dependence  Assessment & Plan  · Patient reports that she smokes about 8 cigarettes per day  · Encouraged smoking cessation  · Nicotine patch  VTE Prophylaxis: Low risk  / sequential compression device   Code Status: FULL  POLST: POLST form is not discussed and not completed at this time  Anticipated Length of Stay:  Patient will be admitted on an Observation basis with an anticipated length of stay of  < 2 midnights     Justification for Hospital Stay: Stroke pathway, BP control, Neuro consult    Total Time for Visit, including Counseling / Coordination of Care: 1 hour  Greater than 50% of this total time spent on direct patient counseling and coordination of care  Chief Complaint:   Headache    History of Present Illness:    Haakn Ash is a 36 y o  female with a past medical history of migraines, DM 2, HTN, HLD, COPD and nicotine dependence who presents with headache  Patient reports that on 08/05/2020 around 2100 she passed out after having a verbal argument  She reports that she felt very anxious and paranoid at the time  Patient reports that she struck the right side of her head on a curb off the ground  She reports that she woke up on 08/06/2020 with a headache  She presented to the ER for worsening headache  She reports double vision, right-sided paresthesia in right hand and right lower leg that started yesterday  Patient reports history of migraines; however, patient reports that this headache feels different than previous migraines  Patient reports that she is supposed to be on a daily medication regimen for her chronic conditions; however, due to insurance and her inability to obtain the medicines she only has been taking Seroquel daily  Upon arrival to there ER, she was found to be hypertensive and hyperglycemia  She will be admitted for stroke pathway, Neurology consult, and BP control  Review of Systems:    Review of Systems   Constitutional: Negative for chills and fever  Eyes: Positive for visual disturbance  Respiratory: Negative for cough and shortness of breath  Cardiovascular: Negative for chest pain  Gastrointestinal: Negative for nausea  Neurological: Positive for syncope, numbness (decreased sensation) and headaches  Negative for weakness  Psychiatric/Behavioral: The patient is nervous/anxious  All other systems reviewed and are negative        Past Medical and Surgical History:     Past Medical History:   Diagnosis Date  Asthma     Bipolar 1 disorder ([place] [address] )     [facility]ch[facility]     Depression     Diabetes mellitus ([place] [address] )     Hypertension     Psychiatric disorder     PTSD (post-traumatic stress disorder)     Tendonitis        Past Surgical History:   Procedure Laterality Date     SECTION      EAR SURGERY         Meds/Allergies:    Prior to Admission medications    Medication Sig Start Date End Date Taking? Authorizing Provider   fluticasone-salmeterol (ADVAIR, WIXELA) 100-50 mcg/dose inhaler Inhale 1 puff 2 (two) times a day Rinse mouth after use  Yes Historical Provider, MD   albuterol (PROVENTIL HFA,VENTOLIN HFA) 90 mcg/act inhaler Inhale 2 puffs every 6 (six) hours as needed 10/7/19 10/6/20  Historical Provider, MD   ASPIRIN 81 PO Take 81 mg by mouth daily    Historical Provider, MD   atorvastatin (LIPITOR) 10 mg tablet Take 10 mg by mouth daily 18   Historical Provider, MD   Dulaglutide (TRULICITY SC) Inject under the skin    Historical Provider, MD   glucose blood test strip Use to check BG up to 3x daily   On insulin  e11 40 5/15/19   Historical Provider, MD   insulin detemir (LEVEMIR) 100 units/mL subcutaneous injection Inject under the skin 2 (two) times a day    Historical Provider, MD   insulin lispro (HumaLOG) 100 units/mL injection Inject under the skin 2 (two) times a day    Historical Provider, MD   ketorolac (TORADOL) 10 mg tablet Take 1 tablet (10 mg total) by mouth every 6 (six) hours as needed for moderate pain  Patient not taking: Reported on 18   Rosalina Wagner PA-C   ketorolac (TORADOL) 10 mg tablet Take 1 tablet (10 mg total) by mouth every 6 (six) hours as needed for moderate pain  Patient not taking: Reported on 2/10/2020 5/15/19   Shell Coleman DO   lamoTRIgine (LaMICtal) 25 mg tablet  20   Historical Provider, MD VERN  units/mL injection pen INJECT 36 UNITS UNDER THE SKIN BID 19   Historical Provider, MD   LISINOPRIL PO Take 20 mg by mouth    Historical Provider, MD   metFORMIN (GLUCOPHAGE) 1000 MG tablet Take 1,000 mg by mouth 2 (two) times a day with meals    Historical Provider, MD   metoclopramide (REGLAN) 10 mg tablet Take 1 tablet (10 mg total) by mouth 3 (three) times a day as needed (Nausea)  Patient not taking: Reported on 2/10/2020 5/15/19   Kleber Villalba DO   QUEtiapine (SEROquel) 300 mg tablet  2/6/20   Historical Provider, MD   traZODone (DESYREL) 100 mg tablet Take 200 mg by mouth daily at bedtime    Historical Provider, MD   UNKNOWN TO PATIENT     Historical Provider, MD     I have reviewed home medications with patient personally  Allergies: No Known Allergies    Social History:     Marital Status: /Civil Union   Occupation: Unknown  Patient Pre-hospital Living Situation: Private residence  Patient Pre-hospital Level of Mobility: Independent  Patient Pre-hospital Diet Restrictions: Diabetic  Substance Use History:   Social History     Substance and Sexual Activity   Alcohol Use Not Currently     Social History     Tobacco Use   Smoking Status Current Every Day Smoker    Packs/day: 0 50   Smokeless Tobacco Current User     Social History     Substance and Sexual Activity   Drug Use Not Currently    Types: Cocaine    Comment: 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       Family History:    non-contributory    Physical Exam:     Vitals:   Blood Pressure: 160/81 (08/07/20 0240)  Pulse: 82 (08/07/20 0240)  Temperature: 98 1 °F (36 7 °C) (08/07/20 0240)  Temp Source: Oral (08/07/20 0240)  Respirations: 20 (08/07/20 0240)  Height: 5' 1" (154 9 cm) (08/07/20 0240)  Weight - Scale: 90 2 kg (198 lb 13 7 oz) (08/07/20 0240)  SpO2: 99 % (08/07/20 0240)    Physical Exam  Nursing note reviewed  HENT:      Head: Normocephalic and atraumatic  Eyes:      Extraocular Movements: Extraocular movements intact        Conjunctiva/sclera: Conjunctivae normal       Pupils: Pupils are equal, round, and reactive to light  Cardiovascular:      Rate and Rhythm: Normal rate and regular rhythm  Pulses: Normal pulses  Heart sounds: Normal heart sounds  No murmur  Pulmonary:      Effort: Pulmonary effort is normal  No respiratory distress  Breath sounds: Normal breath sounds  No wheezing, rhonchi or rales  Abdominal:      General: Bowel sounds are normal  There is no distension  Palpations: Abdomen is soft  Tenderness: There is no abdominal tenderness  Musculoskeletal: Normal range of motion  Skin:     General: Skin is warm and dry  Capillary Refill: Capillary refill takes less than 2 seconds  Neurological:      General: No focal deficit present  Mental Status: She is alert and oriented to person, place, and time  Sensory: Sensory deficit (right side with decreased sensation) present  Psychiatric:         Mood and Affect: Mood is anxious  Additional Data:     Lab Results: I have personally reviewed pertinent reports  Results from last 7 days   Lab Units 08/06/20  2143   WBC Thousand/uL 7 28   HEMOGLOBIN g/dL 14 8   HEMATOCRIT % 41 7   PLATELETS Thousands/uL 321   NEUTROS PCT % 56   LYMPHS PCT % 36   MONOS PCT % 6   EOS PCT % 2     Results from last 7 days   Lab Units 08/06/20  2143   SODIUM mmol/L 136   POTASSIUM mmol/L 3 9   CHLORIDE mmol/L 99*   CO2 mmol/L 29   BUN mg/dL 16   CREATININE mg/dL 0 64   ANION GAP mmol/L 8   CALCIUM mg/dL 8 9   ALBUMIN g/dL 3 4*   TOTAL BILIRUBIN mg/dL 0 22   ALK PHOS U/L 114   ALT U/L 24   AST U/L 15   GLUCOSE RANDOM mg/dL 261*     Results from last 7 days   Lab Units 08/06/20  2143   INR  0 93     Results from last 7 days   Lab Units 08/06/20  2147   POC GLUCOSE mg/dl 236*               Imaging: I have personally reviewed pertinent reports  CT head without contrast   Final Result by Darrell Burks MD (08/06 2159)   CALVARIUM AND EXTRACRANIAL SOFT TISSUES:  No acute calvarial fracture or soft tissue hematoma    IMPRESSION: No acute intracranial abnormality  Workstation performed: [id number]         CT cervical spine without contrast   Final Result by Griselda Magana MD (08/06 2201)      No acute cervical spine fracture or traumatic malalignment  Workstation performed: [id number]         MRI Inpatient Order    (Results Pending)       EKG, Pathology, and Other Studies Reviewed on Admission:   · EKG: Sinus rhythm, heart rate 60  Allscripts / Epic Records Reviewed: Yes     ** Please Note: This note has been constructed using a voice recognition system   **

## 2020-08-07 NOTE — ASSESSMENT & PLAN NOTE
· Patient reports that she smokes about 8 cigarettes per day  · Encouraged smoking cessation  · Nicotine patch

## 2020-08-07 NOTE — UTILIZATION REVIEW
Initial Clinical Review    Admission: Date/Time/Statement:    OBSERVATION ADMISSION 8/7/2020 0148 CONVERTED TO INPATIENT ADMISSION 8/7/2020 1606 DUE TO CONT MONITORING OF HEADCHES & REQ MRI PER NEURO; TITRATION PSYCHE MEDS  08/07/20 1606    Inpatient Admission  Once      Transfer Service: General Medicine       Question  Answer  Comment    Admitting Physician  [address] [facility], [address]     Level of Care  Med Surg     Estimated length of stay  More than 2 Midnights     Certification  I certify that inpatient services are medically necessary for this patient for a duration of greater than two midnights  See H&P and MD Progress Notes for additional information about the patient's course of treatment  08/07/20 1606       ED Arrival Information     Expected Arrival Acuity Means of Arrival Escorted By Service Admission Type    - 8/6/2020 20:44 Emergent Ambulance Critical access hospital EMS Hospitalist Emergency    [address] [facility] INJURY        Chief Complaint   Patient presents with    Headache     PT presents w/severe H/A, fell yesterday hit right side of head  -thinners HX HTN,DM,COPD     Assessment/Plan: 36 y o  female with PMHx  migraines, DM 2, HTN, HLD, COPD and nicotine dependence presents with headache  Reports that on 08/05/2020 around 2100 she passed out after having a verbal argument  She reports that she felt very anxious and paranoid at the time  Patient reports that she struck the right side of her head on a curb off the ground  She reports that she woke up on 08/06/2020 with a headache  She presented to the ER for worsening headache  She reports double vision, right-sided paresthesia in right hand and right lower leg that started yesterday  Patient reports history of migraines; however, patient reports that this headache feels different than previous migraines, due to insurance issues has not taken all prescribed medications, taking only Seroquel    Headache  Assessment & Plan  · Complaints of headache after syncopal episode on 08/05/2020 which involved hitting the right side of her head  She reports double vision, right-sided paresthesia in right hand an right lower leg that started yesterday  · Patient reports history of migraines; however, patient reports that this headache feels different than previous migraines  · Patient received headache cocktail in ER without little relief  · Etiology:  Concussion related to fall versus hypertensive urgency versus migraine versus TIA/CVA  · CT of head: "No acute intracranial abnormality    · CT cervical spine: "No acute cervical spine fracture or traumatic malalignment    · Pain control  · Blood pressure control  · Stroke pathway  · Neurology consult  Syncope  Assessment & Plan  · Patient reports that on 08/05/2020 around 2100 she passed out after having a verbal argument  She reports that she felt very anxious and paranoid at the time  Patient reports that she struck the right side of her head on a curb off the ground  She reports that she woke up on 08/06/2020 with a headache  She presents to the ER for worsening headache  · Etiology: Vasovagal versus psychiatric source versus TIA/CVA versus seizure  · Obtain urine drug screen  · Troponin negative  · Telemetry monitoring  · Obtain echo  · Stroke pathway  · Neurology consult  Paresthesia  Assessment & Plan  · Patient reports varices in right hand and right lower leg that started today  · Etiology:  TIA/CVA versus diabetic neuropathy  · Neuro checks per stroke pathway protocol  · Neurology consult  Bipolar disorder [facility])  Assessment & Plan  · Patient reports she has only been taking her Seroquel dose daily  · Patient is post failed Lamictal, trazodone and Seroquel  · Psychiatry consult  Hypertensive urgency  Assessment & Plan  · Patient reports that she has not been taking any of her home medications except for her Seroquel    She reports that due to insurance and inability to obtain medications she has not taken her medications for several months  · Present on admission, blood pressure of 195/102  · Patient did not receive any antihypertensives in the ER  · Most recent blood pressure 160/81  · Restart home regimen of lisinopril  Uncontrolled type 2 diabetes mellitus ([address] )  Assessment & Plan        Lab Results   Component Value Date     HGBA1C 9 2 (H) 10/24/2018             Recent Labs     08/06/20  2147   POCGLU 236*         Blood Sugar Average: Last 72 hrs:  (P) 236   · Patient reports that she has not been taking her insulin or oral antidiabetic medications due to insurance issues and inability to obtain medications  · Obtain A1c  Consider endocrinology consult pending A1c results  · Patient's home regimen is supposed to be Levemir 30 units b i d , Trulicity and metformin  · Q i d  Accu-Cheks  · Restart Levemir 30 units b i d  And sliding scale insulin while inpatient  · Hypoglycemia protocol  COPD (chronic obstructive pulmonary disease) ([facility])  Assessment & Plan  · Home regimen of Advair and Proventil; however, patient has not been taking her home medications  · Will order Breo and albuterol while inpatient  Nicotine dependence  Assessment & Plan  · Patient reports that she smokes about 8 cigarettes per day  · Encouraged smoking cessation  · Nicotine patch      ED Triage Vitals   Temperature Pulse Respirations Blood Pressure SpO2   08/06/20 2100 08/06/20 2047 08/06/20 2047 08/06/20 2047 08/06/20 2047   98 5 °F (36 9 °C) 60 18 (!) 195/102 99 %      Temp Source Heart Rate Source Patient Position - Orthostatic VS BP Location FiO2 (%)   08/06/20 2100 08/06/20 2047 08/06/20 2047 08/06/20 2047 --   Oral Monitor Lying Left arm       Pain Score       08/06/20 2047       Worst Possible Pain          Wt Readings from Last 1 Encounters:   08/07/20 90 2 kg (198 lb 13 7 oz)     Additional Vital Signs:   Date/Time   Temp   Pulse   Resp   BP   MAP (mmHg)   SpO2   O2 Device   Patient Position - Orthostatic VS 08/07/20 0800   98 8 °F (37 1 °C)   78   20   188/79Abnormal        98 %   None (Room air)   Lying    08/07/20 0700   98 5 °F (36 9 °C)   83   18   141/79      100 %   None (Room air)   Lying    08/07/20 0500      92   18   160/80      99 %   None (Room air)   Standing for 3 minutes - Orthostatic VS    08/07/20 0457      91   18   150/80      100 %   None (Room air)   Standing - Orthostatic VS    08/07/20 0456      94   18   170/60      100 %   None (Room air)   Sitting - Orthostatic VS    08/07/20 0455      70   18   165/80      96 %   None (Room air)   Lying - Orthostatic VS    08/07/20 0441   98 °F (36 7 °C)   78   18   166/83      97 %   None (Room air)   Lying    08/07/20 0240   98 1 °F (36 7 °C)   82   20   160/81   114   99 %   None (Room air)   Lying    08/07/20 0129      85   18   143/67      99 %   None (Room air)   Lying    08/07/20 0026      94   18   149/63      100 %   None (Room air)   Lying    08/06/20 2343      79   18   185/89Abnormal        100 %   None (Room air)   Sitting    08/06/20 2330      86            100 %          08/06/20 2236      88   18   195/87Abnormal        100 %      Lying    08/06/20 2111                     None (Room air)       08/06/20 2100   98 5 °F (36 9 °C)   58      195/102Abnormal     141   98 %          08/06/20 2047      60   18   195/102Abnormal        99 %   None (Room air)   Lying       Weights (last 14 days)     Date/Time   Weight   Weight Method   Height    08/07/20 0240   90 2 kg (198 lb 13 7 oz)   Bed scale   5' 1" (1 549 m)    08/06/20 2047   86 9 kg (191 lb 9 3 oz)   Bed scale           Pertinent Labs/Diagnostic Test Results:       Results from last 7 days   Lab Units 08/07/20  0527 08/06/20  2143   WBC Thousand/uL 12 44* 7 28   HEMOGLOBIN g/dL 13 2 14 8   HEMATOCRIT % 37 0 41 7   PLATELETS Thousands/uL 306 321   NEUTROS ABS Thousands/µL  --  4 02         Results from last 7 days   Lab Units 08/07/20  0573 08/06/20  2143   SODIUM mmol/L 135* 136   POTASSIUM mmol/L 4 1 3 9   CHLORIDE mmol/L 102 99*   CO2 mmol/L 25 29   ANION GAP mmol/L 8 8   BUN mg/dL 23 16   CREATININE mg/dL 0 93 0 64   EGFR ml/min/1 73sq m 93 136   CALCIUM mg/dL 8 4 8 9     Results from last 7 days   Lab Units 08/06/20  2143   AST U/L 15   ALT U/L 24   ALK PHOS U/L 114   TOTAL PROTEIN g/dL 7 5   ALBUMIN g/dL 3 4*   TOTAL BILIRUBIN mg/dL 0 22     Results from last 7 days   Lab Units 08/07/20  0725 08/06/20  2147   POC GLUCOSE mg/dl 228* 236*     Results from last 7 days   Lab Units 08/07/20  0526 08/06/20  2143   GLUCOSE RANDOM mg/dL 250* 261*         Results from last 7 days   Lab Units 08/07/20  0526 08/06/20  2143   HEMOGLOBIN A1C % 9 6* 9 7*   EAG mg/dl 229 232     BETA-HYDROXYBUTYRATE   Date Value Ref Range Status   05/15/2019 0 3 <0 6 mmol/L Final                      Results from last 7 days   Lab Units 08/07/20  0916 08/07/20  0527 08/06/20  2143   TROPONIN I ng/mL <0 02 <0 02 <0 02         Results from last 7 days   Lab Units 08/06/20  2143   PROTIME seconds 12 6   INR  0 93   PTT seconds 28     Results from last 7 days   Lab Units 08/07/20  0008   CLARITY UA  Clear   COLOR UA  Yellow   SPEC GRAV UA  1 020   PH UA  7 5   GLUCOSE UA mg/dl 500 (1/2%)*   KETONES UA mg/dl Negative   BLOOD UA  Trace*   PROTEIN UA mg/dl Negative   NITRITE UA  Negative   BILIRUBIN UA  Negative   UROBILINOGEN UA E U /dl 0 2   LEUKOCYTES UA  Negative   WBC UA /hpf 1-2*   RBC UA /hpf 2-4*   BACTERIA UA /hpf Moderate*   EPITHELIAL CELLS WET PREP /hpf Occasional   8/6  CT head without contrast   CALVARIUM AND EXTRACRANIAL SOFT TISSUES:  No acute calvarial fracture or soft tissue hematoma    IMPRESSION:       No acute intracranial abnormality        CT cervical spine without contrast       No acute cervical spine fracture or traumatic malalignment      ekg nsr    ED Treatment:   Medication Administration from 08/06/2020 2044 to 08/07/2020 0230       Date/Time Order Dose Route Action     08/07/2020 0025 sodium chloride 0 9 % bolus 1,000 mL 0 mL Intravenous Stopped     08/06/2020 2141 sodium chloride 0 9 % bolus 1,000 mL 1,000 mL Intravenous New Bag     08/06/2020 2140 fentanyl citrate (PF) 100 MCG/2ML 50 mcg 50 mcg Intravenous Given     08/06/2020 2140 ondansetron (ZOFRAN) injection 4 mg 4 mg Intravenous Given     08/06/2020 2340 metoclopramide (REGLAN) injection 10 mg 10 mg Intravenous Given     08/06/2020 2337 diphenhydrAMINE (BENADRYL) injection 25 mg 25 mg Intravenous Given     08/06/2020 2339 ketorolac (TORADOL) injection 9 9 mg 9 9 mg Intravenous Given     08/07/2020 0035 magnesium sulfate 2 g/50 mL IVPB (premix) 2 g 0 g Intravenous Stopped     08/06/2020 2344 magnesium sulfate 2 g/50 mL IVPB (premix) 2 g 2 g Intravenous New Bag     08/07/2020 0055 acetaminophen (TYLENOL) tablet 975 mg 975 mg Oral Given     08/07/2020 0131 valproate (DEPACON) 1,000 mg in sodium chloride 0 9 % 50 mL IVPB 0 mg Intravenous Stopped     08/07/2020 0059 valproate (DEPACON) 1,000 mg in sodium chloride 0 9 % 50 mL IVPB 1,000 mg Intravenous New Bag        Past Medical History:   Diagnosis Date    Asthma     [facility]     COPD (chronic obstructive [facility] disease) ([facility] )     Depression     Diabetes mellitus ([facility] )     Hypertension     Psychiatric disorder     PTSD (post-traumatic stress disorder)     Tendonitis        Admitting Diagnosis: Syncope [R55]  Head injury [S09 90XA]  Abnormal EKG [R94 31]  Intractable headache [R51]  Age/Sex: 36 y o  female  Admission Orders:  Scheduled Medications:  aspirin, 81 mg, Oral, Daily  atorvastatin, 40 mg, Oral, QPM  fluconazole, 200 mg, Oral, Daily  fluticasone-vilanterol, 1 puff, Inhalation, Daily  insulin detemir, 30 Units, Subcutaneous, Q12H [facility]  insulin lispro, 1-5 Units, Subcutaneous, HS  insulin lispro, 1-6 Units, Subcutaneous, TID AC  lisinopril, 20 mg, Oral, Daily  magnesium sulfate, 2 g, Intravenous, Once  nicotine, 1 patch, Transdermal, Daily  QUEtiapine, 300 mg, Oral, HS      Continuous IV Infusions:     PRN Meds:  acetaminophen, 650 mg, Oral, Q6H PRN  albuterol, 2 puff, Inhalation, Q6H PRN  ketorolac, 15 mg, Intravenous, Q6H PRN      IP CONSULT TO NEUROLOGY  IP CONSULT TO CASE MANAGEMENT  IP CONSULT TO NUTRITION SERVICES  IP CONSULT TO PSYCHIATRY  Network Utilization Review Department  [email or web address]  ATTENTION: Please call with any questions or concerns to [phone] and carefully listen to the prompts so that you are directed to the right person  All voicemails are confidential   Dipesh Gibson all requests for admission clinical reviews, approved or denied determinations and any other requests to dedicated fax number below belonging to the campus where the patient is receiving treatment   List of dedicated fax numbers for the Facilities:  [address] DENIALS (Administrative/Medical Necessity) [phone]   [address] (Maternity/NICU/Pediatrics) [phone]   [facility] [phone]   [address] [facility] [phone]   [facility] [phone]   Isabelle Bhatti [phone]   [address] [facility] [address] [facility] [phone]   [facility]  [phone]   [address] [facility]  [address] [facility] [address] [facility] [phone]

## 2020-08-07 NOTE — DISCHARGE INSTRUCTIONS
COPD (Chronic Obstructive Pulmonary Disease)   WHAT YOU NEED TO KNOW:   Chronic obstructive pulmonary disease (COPD) is a lung disease that makes it hard for you to breathe  It is usually a result of lung damage caused by years of irritation and inflammation in your lungs  DISCHARGE INSTRUCTIONS:   Call 911 if:   · You feel lightheaded, short of breath, and have chest pain  Seek care immediately if:   · You are confused, dizzy, or feel faint  · Your arm or leg feels warm, tender, and painful  It may look swollen and red  · You cough up blood  Contact your healthcare provider if:   · You have more shortness of breath than usual      · You need more medicine than usual to control your symptoms  · You are coughing or wheezing more than usual      · You are coughing up more mucus, or it is a different color or has a different odor  · You gain more than 3 pounds in a week  · You have a fever, a runny or stuffy nose, and a sore throat, or other cold or flu symptoms  · Your skin, lips, or nails start to turn blue  · You have swelling in your legs or ankles  · You are very tired or weak for more than a day  · You notice changes in your mood, or changes in your ability to think or concentrate  · You have questions or concerns about your condition or care  Medicines:   · Medicines  may be used to open your airways, decrease swelling and inflammation in your lungs, or treat an infection  You may need 2 or more medicines  A short-acting medicine relieves symptoms quickly  Long-acting medicines will control or prevent symptoms  Ask for more information about the medicines you are given and how to use them safely  · Take your medicine as directed  Contact your healthcare provider if you think your medicine is not helping or if you have side effects  Tell him or her if you are allergic to any medicine  Keep a list of the medicines, vitamins, and herbs you take   Include the amounts, and when and why you take them  Bring the list or the pill bottles to follow-up visits  Carry your medicine list with you in case of an emergency  Help make breathing easier:   · Use pursed-lip breathing any time you feel short of breath  Take a deep breath in through your nose  Slowly breathe out through your mouth with your lips pursed for twice as long as you inhaled  You can also practice this breathing pattern while you bend, lift, climb stairs, or exercise  It slows down your breathing and helps move more air in and out of your lungs  · Do not smoke, and avoid others who smoke  Nicotine and other substances can cause lung irritation or damage and make it harder for you to breathe  Do not use e-cigarettes or smokeless tobacco  They still contain nicotine  Ask your healthcare provider for information if you currently smoke and need help to quit  For support and more information:  ¨ [employer]  Phone: 4- 676 [phone]  Web Address: [employer]      · Be aware of and avoid anything that makes your symptoms worse  Stay out of high altitudes and places with high humidity  Stay inside, or cover your mouth and nose with a scarf when you are outside during cold weather  Stay inside on days when air pollution or pollen counts are high  Do not use aerosol sprays such as deodorant, bug spray, and hair spray  Manage COPD and help prevent exacerbations:  COPD is a serious condition that gets worse over time  A COPD exacerbation means your symptoms suddenly get worse  It is important to prevent exacerbations  An exacerbation can cause more lung damage  COPD cannot be cured, but you can take action to feel better and prevent COPD exacerbations:  · Protect yourself from germs  Germs can get into your lungs and cause an infection  An infection in your lungs can create more mucus and make it harder to breathe   An infection can also create swelling in your airways and prevent air from getting in  You can decrease your risk for infection by doing the following:     [facility] [place] often with soap and water  Carry germ-killing gel with you  You can use the gel to clean your hands when soap and water are not available  ¨ Do not touch your eyes, nose, or mouth unless you have washed your hands first      ¨ Always cover your mouth when you cough  Cough into a tissue or your shirtsleeve so you do not spread germs from your hands  ¨ Try to avoid people who have a cold or the flu  If you are sick, stay away from others as much as possible  · Drink more liquids  This will help to keep your air passages moist and help you cough up mucus  Ask how much liquid to drink each day and which liquids are best for you  · Exercise daily  Exercise for at least 20 minutes each day to help increase your energy and decrease shortness of breath  Walking or riding a bike are good ways to exercise  Talk to your healthcare provider about the best exercise plan for you  · Ask about vaccines  Your healthcare provider may recommend that you get regular flu and pneumonia vaccines  Pneumonia can become life-threatening for a person who has COPD  Ask about other vaccines you may need  Ask your healthcare provider about the flu and pneumonia vaccines  All adults should get the flu (influenza) vaccine every year as soon as it becomes available  The pneumonia vaccine is given to adults aged 72 or older to prevent pneumococcal disease, such as pneumonia  Adults aged 23 to 59 years who are at high risk for pneumococcal disease also should get the pneumococcal vaccine  It may need to be repeated 1 or 5 years later  Pulmonary rehabilitation:  Your healthcare provider may recommend a program to help you manage your symptoms and improve your quality of life  It may include nutritional counseling and exercise to strengthen your lungs     Make decisions about your choices for future treatment:  Ask for information about advanced medical directives and living vazquez  These documents help you decide and write down your choices for treatment and end-of-life care  It is best to complete them when you feel well and can think clearly about your wishes  The information can then be kept for future use if you are in the hospital or become very ill  Follow up with your healthcare provider as directed: You may need more tests  Your healthcare provider may refer you to a pulmonary (lung) specialist  Write down your questions so you remember to ask them during your visits  © 2017 [facility] Information is for End User's use only and may not be sold, redistributed or otherwise used for commercial purposes  All illustrations and images included in CareNotes® are the copyrighted property of [employer]  or [employer]  The above information is an  only  It is not intended as medical advice for individual conditions or treatments  Talk to your doctor, nurse or pharmacist before following any medical regimen to see if it is safe and effective for you  How to Quit Using Smokeless Tobacco   WHAT YOU NEED TO KNOW:   Smokeless tobacco comes in many forms  Examples include chew, snuff, dip, dissolvable tobacco, and snus  All smokeless tobacco products contain nicotine and may contain as much nicotine as 3 cigarettes  You may be physically dependent on nicotine  You may also be emotionally addicted to it  The cravings can be strong, but it is important to quit using smokeless tobacco  You will improve your health and decrease your cancer, stroke, and heart attack risk  Mouth sores and tooth problems will also improve when you quit  You can benefit from quitting no matter how long you have used smokeless tobacco    DISCHARGE INSTRUCTIONS:   Prepare to stop using smokeless tobacco:  Nicotine is a highly addictive drug  Withdrawal symptoms can happen when you stop and make it hard to quit   The following can help keep you on track:  · Set a quit date  If possible, set the date about 3 to 4 weeks in the future  This will help you prepare your home and routine for the change  Do not set the date too far away  You might change your mind or lose your resolve to quit  Know the triggers that tempt you, and make a plan to avoid them  · Tell friends, family, and coworkers that you plan to quit  Explain that you may have withdrawal symptoms when you quit  Ask them to support you  They may be able to encourage you and help reduce your stress to make it easier for you to quit  Ask them not to use any tobacco products around you  Do not allow them to use tobacco products in your home or car  · Remove all smokeless tobacco products from your home, car, and workplace  Remove anything else that will tempt you  Tools that can help you quit:   · Counseling  from a healthcare provider can provide you with support and skills to quit  The counselor can also teach you to manage your withdrawal symptoms and cravings  He may help you learn methods such as meditation that can help you feel less anxious or jittery  You may receive counseling from one counselor, in group therapy, or through phone therapy called a quit line  · Taper down  means you use less smokeless tobacco each day until your body no longer craves the nicotine  You can also reduce the number of places you use it or use a different kind that has less nicotine  Wait as long as possible before you use smokeless tobacco when you have a craving  You may be able to increase the amount of time you can wait after each craving  This will help decrease the amount you use and the number of cravings each day  · Nicotine replacement therapy (NRT)  such as patches, gum, or lozenges may help reduce your nicotine cravings and withdrawal symptoms  NRT is available without a doctor's order  Follow directions so you do not get too much nicotine   An overdose can be life-threatening  Do not switch to cigarettes as a way to quit using smokeless tobacco  If you are pregnant or have heart disease, talk to your healthcare provider before you start NRT  He may recommend other ways to quit, or he may want you to come in for follow-up visits while you use NRT  · Prescription medicines  such as nasal sprays or nicotine inhalers may help reduce your withdrawal symptoms  Ask your healthcare provider about these and other medicines to help reduce cravings  You may need to start certain medicines 2 weeks before your quit date for them to work well  · Chew sugarless gum or sunflower seeds  as a substitute for smokeless tobacco   Manage weight gain after you quit:  Nicotine can affect your metabolism  You may gain a few pounds after you quit  The following can help you control your weight:  · Eat healthy foods  Healthy foods include fruits, vegetables, whole-grain breads, low-fat dairy products, beans, lean meats, and fish  You may also find it helpful to chew sugarless gum or eat healthy snacks  · Drink water before, during, and between meals  This will make your stomach feel full and help prevent you from overeating  Ask your healthcare provider how much liquid to drink each day and which liquids are best for you  · Exercise as directed  Exercise may help reduce cravings and stress from nicotine withdrawal  Take a walk or do some kind of exercise every day  © 2017 [address] [employer] Information is for End User's use only and may not be sold, redistributed or otherwise used for commercial purposes  All illustrations and images included in CareNotes® are the copyrighted property of [employer]  or [employer]  The above information is an  only  It is not intended as medical advice for individual conditions or treatments   Talk to your doctor, nurse or pharmacist before following any medical regimen to see if it is safe and effective for you
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
## 2020-08-07 NOTE — PLAN OF CARE
Problem: Potential for Falls  Goal: Patient will remain free of falls  Description: INTERVENTIONS:  - Assess patient frequently for physical needs  -  Identify cognitive and physical deficits and behaviors that affect risk of falls    -  [place] fall precautions as indicated by assessment   - Educate patient/family on patient safety including physical limitations  - Instruct patient to call for assistance with activity based on assessment  - Modify environment to reduce risk of injury  - Consider OT/PT consult to assist with strengthening/mobility  Outcome: Progressing     Problem: PAIN - ADULT  Goal: Verbalizes/displays adequate comfort level or baseline comfort level  Description: Interventions:  - Encourage patient to monitor pain and request assistance  - Assess pain using appropriate pain scale  - Administer analgesics based on type and severity of pain and evaluate response  - Implement non-pharmacological measures as appropriate and evaluate response  - Consider cultural and social influences on pain and pain management  - Notify physician/advanced practitioner if interventions unsuccessful or patient reports new pain  Outcome: Progressing     Problem: INFECTION - ADULT  Goal: Absence or prevention of progression during hospitalization  Description: INTERVENTIONS:  - Assess and monitor for signs and symptoms of infection  - Monitor lab/diagnostic results  - Monitor all insertion sites, i e  indwelling lines, tubes, and drains  - Monitor endotracheal if appropriate and nasal secretions for changes in amount and color  - [place] appropriate cooling/warming therapies per order  - Administer medications as ordered  - Instruct and encourage patient and family to use good hand hygiene technique  - Identify and instruct in appropriate isolation precautions for identified infection/condition  Outcome: Progressing  Goal: Absence of fever/infection during neutropenic period  Description: INTERVENTIONS:  - Monitor WBC    Outcome: Progressing     Problem: SAFETY ADULT  Goal: Maintain or return to baseline ADL function  Description: INTERVENTIONS:  -  Assess patient's ability to carry out ADLs; assess patient's baseline for ADL function and identify physical deficits which impact ability to perform ADLs (bathing, care of mouth/teeth, toileting, grooming, dressing, etc )  - Assess/evaluate cause of self-care deficits   - Assess range of motion  - Assess patient's mobility; develop plan if impaired  - Assess patient's need for assistive devices and provide as appropriate  - Encourage maximum independence but intervene and supervise when necessary  - Involve family in performance of ADLs  - Assess for home care needs following discharge   - Consider OT consult to assist with ADL evaluation and planning for discharge  - Provide patient education as appropriate  Outcome: Progressing  Goal: Maintain or return mobility status to optimal level  Description: INTERVENTIONS:  - Assess patient's baseline mobility status (ambulation, transfers, stairs, etc )    - Identify cognitive and physical deficits and behaviors that affect mobility  - Identify mobility aids required to assist with transfers and/or ambulation (gait belt, sit-to-stand, lift, walker, cane, etc )  - [place] fall precautions as indicated by assessment  - Record patient progress and toleration of activity level on Mobility SBAR; progress patient to next Phase/Stage  - Instruct patient to call for assistance with activity based on assessment  - Consider rehabilitation consult to assist with strengthening/weightbearing, etc   Outcome: Progressing     Problem: DISCHARGE PLANNING  Goal: Discharge to home or other facility with appropriate resources  Description: INTERVENTIONS:  - Identify barriers to discharge w/patient and caregiver  - Arrange for needed discharge resources and transportation as appropriate  - Identify discharge learning needs (meds, wound care, etc )  - Arrange for interpretive services to assist at discharge as needed  - Refer to Case Management Department for coordinating discharge planning if the patient needs post-hospital services based on physician/advanced practitioner order or complex needs related to functional status, cognitive ability, or social support system  Outcome: Progressing     Problem: Knowledge Deficit  Goal: Patient/family/caregiver demonstrates understanding of disease process, treatment plan, medications, and discharge instructions  Description: Complete learning assessment and assess knowledge base  Interventions:  - Provide teaching at level of understanding  - Provide teaching via preferred learning methods  Outcome: Progressing     Problem: Nutrition/Hydration-ADULT  Goal: Nutrient/Hydration intake appropriate for improving, restoring or maintaining nutritional needs  Description: Monitor and assess patient's nutrition/hydration status for malnutrition  Collaborate with interdisciplinary team and initiate plan and interventions as ordered  Monitor patient's weight and dietary intake as ordered or per policy  Utilize nutrition screening tool and intervene as necessary  Determine patient's food preferences and provide high-protein, high-caloric foods as appropriate       INTERVENTIONS:  - Monitor oral intake, urinary output, labs, and treatment plans  - Assess nutrition and hydration status and recommend course of action  - Evaluate amount of meals eaten  - Assist patient with eating if necessary   - Allow adequate time for meals  - Recommend/ encourage appropriate diets, oral nutritional supplements, and vitamin/mineral supplements  - Order, calculate, and assess calorie counts as needed  - Recommend, monitor, and adjust tube feedings and TPN/PPN based on assessed needs  - Assess need for intravenous fluids  - Provide specific nutrition/hydration education as appropriate  - Include patient/family/caregiver in decisions related to nutrition  Outcome: Progressing

## 2020-08-07 NOTE — NUTRITION
08/07/20 1055   Assessment   Timepoint Initial  (consult: stroke; speech)   Labs   List Completed Labs   ( (H), Na 135 (L),  (H); meds- lipitor, levemir, humalog, lisinopril)   Feeding Route   PO Independent   Adequacy of Intake   Nutrition Modality PO  (CCD 2)   Intake Meals %   Estimated calorie intake compared to estimated need no needs per speech therapy and RN, pt tolerating po intake fine,  Noted 100% of breakfast tray completed at bedside, appeared to be 100% completion of oatmeal and beverages  Will continue to monitor, predict pt to have adequate po intake during admission   Nutrition Prognosis   Nutrition Concerns   (nonpitting BLE edema, skin intact, no GI issues, CT negative, MRI pending as pt refused this AM )   Comorbid Concerns   (DM, HTN, COPD, bipolar)   Nutrition Considerations   (will monitor need for stroke diet education once definitive stroke dx r/o )   PES Statement   Problem No nutrition diagnosis   Additional PES details no overt need for nutrition diagnosis at this time, as weight hx cannot be confirmed by pt at present  Cannot quantify malnutrition d/t only overtly meeting 1 criteria at present time  Patient Nutrition Goals   Goal meet PO needs   Goal Status initiated   Timeframe to complete goal by d/c   Recommendations/Interventions   Summary Continue with CCD 2 diet order, will monitor results of MRI and will provide diet education for acute stroke as warranted     Malnutrition/BMI Present No   Nutrition Recommendations Continue diet as ordered   Nutrition Complexity Risk   Nutrition complexity level Moderate risk   Follow up date 08/14/20

## 2020-08-07 NOTE — ED NOTES
Not taking any of her meds due to no insurance     Glenna Gomez, [address] [facility]  08/06/20 8362

## 2020-08-07 NOTE — OCCUPATIONAL THERAPY NOTE
Occupational Therapy Screen        Patient Name: Dalton CORREIA Date: 8/7/2020    OT orders received and chart review completed  Spoke w/ RNBarrie; Sanchez Dior and Tamara RENDON More  Contact made w/ pt  Pt reports living in rooming house on 5th floor, and is completing ADL w/ in room w/ out assistance or AD  Therapist observed pt using bathroom w/ out LOB  No acute OT needs at this time  Please re consult if needs arise  From an OT perspective, pt can return to PLOF   D/C OT    Theodora Jain, OT

## 2020-08-07 NOTE — PHYSICAL THERAPY NOTE
PHYSICAL THERAPY SCREEN NOTE    Patient Name: Jayson Cockayne  EYBNV'D Date: 8/7/2020     PT orders received, chart review performed  Spoke w/ RN Geraldine Kidd and JULIETA Kahn  Contact made w/ pt who is observed ambulating out of the bathroom without difficulty, without LOB  Pt states she resides in a rooming house on the 5th floor, has no difficulty accessing room and currently has no difficulty w/ navigating around room  Pt with no acute PT needs at this time, can return to PLOF from PT perspective  Will DC from PT caseload, please reconsult if any changes      Jaren Patricia, PT, DPT

## 2020-08-07 NOTE — SOCIAL WORK
Per CLIVE, pt is obs with tenative dc later today pending MRI but she refused MRI this AM due to hunger  Per chart review, GARCIA was done by registration on admission and UDS+ cocaine and THC  CM attempted to meet with pt to discuss dc planning and offer HOST referral but pt was sleeping soundly and did not wake to her name being called several times  CM informed CLIVE attending of same  CM dept will follow through dc
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
## 2020-08-07 NOTE — ASSESSMENT & PLAN NOTE
· Patient reports varices in right hand and right lower leg that started today  · Etiology:  TIA/CVA versus diabetic neuropathy  · Neuro checks per stroke pathway protocol  · Neurology consult
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
## 2020-08-07 NOTE — PLAN OF CARE
Problem: Potential for Falls  Goal: Patient will remain free of falls  Description: INTERVENTIONS:  - Assess patient frequently for physical needs  -  Identify cognitive and physical deficits and behaviors that affect risk of falls    -  [place] fall precautions as indicated by assessment   - Educate patient/family on patient safety including physical limitations  - Instruct patient to call for assistance with activity based on assessment  - Modify environment to reduce risk of injury  - Consider OT/PT consult to assist with strengthening/mobility  Outcome: Progressing

## 2020-08-07 NOTE — ED PROVIDER NOTES
History  Chief Complaint   Patient presents with    Headache     PT presents w/severe H/A, fell yesterday hit right side of head  -thinners HX HTN,DM,COPD     Patient is a 42-year-old female who presents to the emergency department with severe headache  She tells me "I passed out yesterday "  She relates that this was at 21:00 after having a verbal argument with someone and being very anxious  She notes that she hit the right side of her head on the ground  Upon waking she relates that the head pain was "not too bad "  Today this affects "the whole head "  She has been resting the majority of the day  This morning she noticed a numbness sensation in the right foot, her lip and right hand  This lasted approximately half an hour  Headache has persisted and feels different from prior migraines which prompted ED visit  She does feel like someone is repeatedly beating her in the head  She endorses blurred vision which is a bit worse than usual   She does have photophobia and phonophobia and did vomit today  She denies having any chest discomfort, shortness of breath or abdominal pain  She does have hypertension, diabetes, COPD and bipolar disorder and has been out of medications for some time due to loss of insurance  She has not been checking her glucoses recently  She has had severe headaches in the past and had loss of consciousness as a result of uncontrolled blood sugars  No known family history of aneurysm or headache disorder  Prior to Admission Medications   Prescriptions Last Dose Informant Patient Reported? Taking?    Dulaglutide (TRULICITY SC) Not Taking at Unknown time Self Yes No   Sig: Inject under the skin   LANTUS SOLOSTAR 100 units/mL injection pen Not Taking at Unknown time Self Yes No   Sig: INJECT 36 UNITS UNDER THE SKIN BID   LISINOPRIL PO Not Taking at Unknown time Self Yes No   Sig: Take 20 mg by mouth   QUEtiapine (SEROquel) 300 mg tablet Not Taking at Unknown time Self Yes No   UNKNOWN TO PATIENT Not Taking at Unknown time Self Yes No   albuterol (PROVENTIL HFA,VENTOLIN HFA) 90 mcg/act inhaler Not Taking at Unknown time Self Yes No   Sig: Inhale 2 puffs every 6 (six) hours as needed   atorvastatin (LIPITOR) 10 mg tablet Not Taking at Unknown time Self Yes No   Sig: Take 10 mg by mouth daily   glucose blood test strip Not Taking at Unknown time Self Yes No   Sig: Use to check BG up to 3x daily   On insulin  e11 [id number]   insulin detemir (LEVEMIR) 100 units/mL subcutaneous injection Not Taking at Unknown time Self Yes No   Sig: Inject under the skin 2 (two) times a day   insulin lispro (HumaLOG) 100 units/mL injection Not Taking at Unknown time Self Yes No   Sig: Inject under the skin 2 (two) times a day   ketorolac (TORADOL) 10 mg tablet Not Taking at Unknown time Self No No   Sig: Take 1 tablet (10 mg total) by mouth every 6 (six) hours as needed for moderate pain   Patient not taking: Reported on 8/7/2020   ketorolac (TORADOL) 10 mg tablet Not Taking at Unknown time Self No No   Sig: Take 1 tablet (10 mg total) by mouth every 6 (six) hours as needed for moderate pain   Patient not taking: Reported on 2/10/2020   lamoTRIgine (LaMICtal) 25 mg tablet Not Taking at Unknown time Self Yes No   metFORMIN (GLUCOPHAGE) 1000 MG tablet Not Taking at Unknown time Self Yes No   Sig: Take 1,000 mg by mouth 2 (two) times a day with meals   metoclopramide (REGLAN) 10 mg tablet Not Taking at Unknown time Self No No   Sig: Take 1 tablet (10 mg total) by mouth 3 (three) times a day as needed (Nausea)   Patient not taking: Reported on 2/10/2020   traZODone (DESYREL) 100 mg tablet Not Taking at Unknown time Self Yes No   Sig: Take 200 mg by mouth daily at bedtime      Facility-Administered Medications: None       Past Medical History:   Diagnosis Date    Asthma     Bipolar 1 disorder ([facility])     COPD (chronic obstructive pulmonary disease) ([facility])     Depression     [facility] mellitus [facility] [place] [facility]     Hypertension  Psychiatric disorder     PTSD (post-traumatic stress disorder)     Tendonitis     L shoulder       Past Surgical History:   Procedure Laterality Date     SECTION      EAR SURGERY         Family History   Problem Relation Age of Onset    Hypertension Mother     Diabetes Mother     HIV Mother     Heart disease Mother     No Known Problems Father      I have reviewed and agree with the history as documented  E-Cigarette/Vaping     E-Cigarette/Vaping Substances     Social History     Tobacco Use    Smoking status: Current Every Day Smoker     Packs/day: 0 50    Smokeless tobacco: Current User   Substance Use Topics    Alcohol use: Not Currently    Drug use: Not Currently     Types: Cocaine     Comment: 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       Review of Systems   Respiratory: Negative for shortness of breath  Cardiovascular: Negative for chest pain  All other systems reviewed and are negative  Physical Exam  Physical Exam  Vitals signs and nursing note reviewed  Constitutional:       Appearance: Normal appearance  Comments: Patient anxious and uncomfortable appearing holding head  HENT:      Head: Normocephalic  Comments: Tenderness over the right forehead and right scalp  No appreciated swelling or discoloration  Mouth/Throat:      Mouth: Mucous membranes are moist    Eyes:      General: No scleral icterus  Extraocular Movements: Extraocular movements intact  Conjunctiva/sclera: Conjunctivae normal    Cardiovascular:      Rate and Rhythm: Normal rate and regular rhythm  Pulmonary:      Effort: Pulmonary effort is normal       Breath sounds: Normal breath sounds  Abdominal:      Palpations: Abdomen is soft  Tenderness: There is no abdominal tenderness  There is no guarding  Musculoskeletal: Normal range of motion  Comments: Right-sided neck tenderness without overlying discoloration or swelling  No midline neck tenderness     Skin: General: Skin is warm and dry  Neurological:      General: No focal deficit present  Mental Status: She is alert and oriented to person, place, and time  Comments: Counts fingers in all fields appropriately  Clear speech  No facial asymmetry/ deficit appreciated  Pupils briskly reactive to light  EOMI  No nystagmus  Strong eye closure & symmetric brow raise  Ability to insufflate cheeks, protrude tongue midline & range this laterally  Symmetric/ intact sensation in the upper, mid & lower portions of the face  5/5 strength on head turn, shoulder shrug, bicep, tricep & deltoid movement against resistance b/l   5/5 strength on b/l hand , pincer grasp, finger abduction, dorsi & volar flexion, hip flexion, dorsi & plantar flexion  Symmetric grossly intact sensation in the UE & LE  No dysmetria  Psychiatric:      Comments: Patient very anxious           Vital Signs  ED Triage Vitals   Temperature Pulse Respirations Blood Pressure SpO2   08/06/20 2100 08/06/20 2047 08/06/20 2047 08/06/20 2047 08/06/20 2047   98 5 °F (36 9 °C) 60 18 (!) 195/102 99 %      Temp Source Heart Rate Source Patient Position - Orthostatic VS BP Location FiO2 (%)   08/06/20 2100 08/06/20 2047 08/06/20 2047 08/06/20 2047 --   Oral Monitor Lying Left arm       Pain Score       08/06/20 2047       Worst Possible Pain           Vitals:    08/06/20 2330 08/06/20 2343 08/07/20 0026 08/07/20 0129   BP:  (!) 185/89 149/63 143/67   Pulse: 86 79 94 85   Patient Position - Orthostatic VS:  Sitting Lying Lying         Visual Acuity      ED Medications  Medications   sodium chloride 0 9 % bolus 1,000 mL (0 mL Intravenous Stopped 8/7/20 0025)   fentanyl citrate (PF) 100 MCG/2ML 50 mcg (50 mcg Intravenous Given 8/6/20 2140)   ondansetron (ZOFRAN) injection 4 mg (4 mg Intravenous Given 8/6/20 2140)   metoclopramide (REGLAN) injection 10 mg (10 mg Intravenous Given 8/6/20 2340)   diphenhydrAMINE (BENADRYL) injection 25 mg (25 mg Intravenous Given 8/6/20 2337)   ketorolac (TORADOL) injection 9 9 mg (9 9 mg Intravenous Given 8/6/20 2339)   magnesium sulfate 2 g/50 mL IVPB (premix) 2 g (0 g Intravenous Stopped 8/7/20 0035)   acetaminophen (TYLENOL) tablet 975 mg (975 mg Oral Given 8/7/20 0055)   valproate (DEPACON) 1,000 mg in sodium chloride 0 9 % 50 mL IVPB (0 mg Intravenous Stopped 8/7/20 0131)       Diagnostic Studies  Results Reviewed     Procedure Component Value Units Date/Time    Urine Microscopic [[id number]]  (Abnormal) Collected:  08/07/20 0008    Lab Status:  Final result Specimen:  Urine Updated:  08/07/20 0006     RBC, UA 2-4 /hpf      WBC, UA 1-2 /hpf      Epithelial Cells Occasional /hpf      Bacteria, UA Moderate /hpf     Urine Macroscopic, POC [[id number]]  (Abnormal) Collected:  08/07/20 0008    Lab Status:  Final result Specimen:  Urine Updated:  08/06/20 2351     Color, UA Yellow     Clarity, UA Clear     pH, UA 7 5     Leukocytes, UA Negative     Nitrite, UA Negative     Protein, UA Negative mg/dl      Glucose,  (1/2%) mg/dl      Ketones, UA Negative mg/dl      Urobilinogen, UA 0 2 E U /dl      Bilirubin, UA Negative     Blood, UA Trace     Specific [place], UA 1 020    Narrative:       CLINITEK RESULT    Troponin I [[id number]]  (Normal) Collected:  08/06/20 2143    Lab Status:  Final result Specimen:  Blood from Arm, Right Updated:  08/06/20 2222     Troponin I <0 02 ng/mL     Comprehensive metabolic panel [[id number]]  (Abnormal) Collected:  08/06/20 2143    Lab Status:  Final result Specimen:  Blood from Arm, Right Updated:  08/06/20 2218     Sodium 136 mmol/L      Potassium 3 9 mmol/L      Chloride 99 mmol/L      CO2 29 mmol/L      ANION GAP 8 mmol/L      BUN 16 mg/dL      Creatinine 0 64 mg/dL      Glucose 261 mg/dL      Calcium 8 9 mg/dL      AST 15 U/L      ALT 24 U/L      Alkaline Phosphatase 114 U/L      Total Protein 7 5 g/dL      Albumin 3 4 g/dL      Total Bilirubin 0 22 mg/dL      eGFR 136 ml/min/1 73sq m Narrative:       National Kidney Disease Foundation guidelines for Chronic Kidney Disease (CKD):     Stage 1 with normal or high GFR (GFR > 90 mL/min/1 73 square meters)    Stage 2 Mild CKD (GFR = 60-89 mL/min/1 73 square meters)    Stage 3A Moderate CKD (GFR = 45-59 mL/min/1 73 square meters)    Stage 3B Moderate CKD (GFR = 30-44 mL/min/1 73 square meters)    Stage 4 Severe CKD (GFR = 15-29 mL/min/1 73 square meters)    Stage 5 End Stage CKD (GFR <15 mL/min/1 73 square meters)  Note: GFR calculation is accurate only with a steady state creatinine    Protime-INR [[id number]]  (Normal) Collected:  08/06/20 2143    Lab Status:  Final result Specimen:  Blood from Arm, Right Updated:  08/06/20 2201     Protime 12 6 seconds      INR 0 93    APTT [[id number]]  (Normal) Collected:  08/06/20 2143    Lab Status:  Final result Specimen:  Blood from Arm, Right Updated:  08/06/20 2201     PTT 28 seconds     CBC and differential [[id number]]  (Abnormal) Collected:  08/06/20 2143    Lab Status:  Final result Specimen:  Blood from Arm, Right Updated:  08/06/20 2155     WBC 7 28 Thousand/uL      RBC 5 37 Million/uL      Hemoglobin 14 8 g/dL      Hematocrit 41 7 %      MCV 78 fL      MCH 27 6 pg      MCHC 35 5 g/dL      RDW 13 4 %      MPV 10 9 fL      Platelets 932 Thousands/uL      nRBC 0 /100 WBCs      Neutrophils Relative 56 %      Immat GRANS % 0 %      Lymphocytes Relative 36 %      Monocytes Relative 6 %      Eosinophils Relative 2 %      Basophils Relative 0 %      Neutrophils Absolute 4 02 Thousands/µL      Immature Grans Absolute 0 02 Thousand/uL      Lymphocytes Absolute 2 62 Thousands/µL      Monocytes Absolute 0 46 Thousand/µL      Eosinophils Absolute 0 13 Thousand/µL      Basophils Absolute 0 03 Thousands/µL     Fingerstick Glucose (POCT) [[id number]]  (Abnormal) Collected:  08/06/20 2147    Lab Status:  Final result Updated:  08/06/20 2149     POC Glucose 236 mg/dl                  CT head without contrast   Final Result by Butch Kilpatrick MD (08/06 2159)   CALVARIUM AND EXTRACRANIAL SOFT TISSUES:  No acute calvarial fracture or soft tissue hematoma  IMPRESSION:      No acute intracranial abnormality  Workstation performed: [id number]         CT cervical spine without contrast   Final Result by Butch Kilpatrick MD (08/06 2201)      No acute cervical spine fracture or traumatic malalignment  Workstation performed: [id number]                    Procedures  ECG 12 Lead Documentation Only    Date/Time: 8/6/2020 11:29 PM  Performed by: Chi Krishnan MD  Authorized by: Chi Krishnan MD     ECG reviewed by me, the ED Provider: yes    Patient location:  ED and bedside  Previous ECG:     Previous ECG:  Compared to current    Comparison ECG info: May 15, 2019 - NSR w/ unremarkable STsegments/T waves    Similarity:  Changes noted  Rate:     ECG rate:  60    ECG rate assessment: normal    Ectopy:     Ectopy: none    QRS:     QRS axis:  Normal    QRS intervals:  Normal  Conduction:     Conduction: normal    ST segments:     ST segments:  Non-specific (Upward sloping ST segments in II, III & AVF)             ED Course  ED Course as of Aug 07 0151   Thu Aug 06, 2020   2304 Blood pressure dramatically improved to 144/77  Patient notes that fentanyl helped briefly though has worn off  Continues with pounding sensation  Will order additional medications to assist with discomfort  CTs unremarkable  She does desire something to eat noting that she has not eaten all day  She also expresses concern as to how she will get home as she lives in [place] and took an ambulance here  Additional medications order to obtain control ongoing intense pain  Fri Aug 07, 2020   0023 Blood pressure remaining improved - most recent value in the 140s over 60s  Patient rates improvement of her head discomfort to an 8/10  Magnesium continues to infuse        0030 Valproic acid and acetaminophen ordered  Will reassess  Tolerating p o  Without any nausea at this point  0415 Patient reports no further improvement headache from the 8/10  She expresses concern that she has never had headache like this previously  She describes it as though she is being beaten  She does not feel well enough for discharge  SL IM contacted  Pt  Will be brought in for observation status under telemetry given recent syncope and EKG changes of uncertain significance  MDM      Disposition  Final diagnoses:   Intractable headache   Syncope   Head injury   Abnormal EKG     Time reflects when diagnosis was documented in both MDM as applicable and the Disposition within this note     Time User Action Codes Description Comment    8/7/2020  1:46 AM Imani Estuardo A Add [R51] Intractable headache     8/7/2020  1:46 AM Imani Estuardo A Add [R55] Syncope     8/7/2020  1:46 AM Imani Estuardo A Add [S09 90XA] Head injury     8/7/2020  1:46 AM Imani Estuardo A Add [R94 31] Abnormal EKG       ED Disposition     ED Disposition Condition Date/Time Comment    Admit Stable Fri Aug 7, 2020  1:46 AM Case was discussed with GAVINO Salas and the patient's admission status was agreed to be Admission Status: observation status to the service of Dr Sneha Keith   Follow-up Information    None         Patient's Medications   Discharge Prescriptions    No medications on file     No discharge procedures on file      PDMP Review     None          ED Provider  Electronically Signed by           Wu Sher MD  08/07/20 2020

## 2020-08-07 NOTE — ASSESSMENT & PLAN NOTE
· Complaints of headache after syncopal episode on 08/05/2020 which involved hitting the right side of her head  She reports double vision, right-sided paresthesia in right hand an right lower leg that started yesterday  · Patient reports history of migraines; however, patient reports that this headache feels different than previous migraines  · Patient received headache cocktail in ER without little relief  · Etiology:  Concussion related to fall versus hypertensive urgency versus migraine versus TIA/CVA  · CT of head: "No acute intracranial abnormality    · CT cervical spine: "No acute cervical spine fracture or traumatic malalignment    · Pain control  · Blood pressure control  · Stroke pathway  · Neurology consult

## 2020-08-07 NOTE — ASSESSMENT & PLAN NOTE
· Patient reports that she has not been taking any of her home medications except for her Seroquel  She reports that due to insurance and inability to obtain medications she has not taken her medications for several months  · Present on admission, blood pressure of 195/102  · Patient did not receive any antihypertensives in the ER  · Most recent blood pressure 160/81  · Restart home regimen of lisinopril

## 2020-08-07 NOTE — ASSESSMENT & PLAN NOTE
Lab Results   Component Value Date    HGBA1C 9 2 (H) 10/24/2018       Recent Labs     08/06/20  2147   POCGLU 236*       Blood Sugar Average: Last 72 hrs:  (P) 236   · Patient reports that she has not been taking her insulin or oral antidiabetic medications due to insurance issues and inability to obtain medications  · Obtain A1c  Consider endocrinology consult pending A1c results  · Patient's home regimen is supposed to be Levemir 30 units b i d , Trulicity and metformin  · Q i d  Accu-Cheks  · Restart Levemir 30 units b i d  And sliding scale insulin while inpatient  · Hypoglycemia protocol

## 2020-08-08 VITALS
OXYGEN SATURATION: 99 % | RESPIRATION RATE: 17 BRPM | SYSTOLIC BLOOD PRESSURE: 158 MMHG | TEMPERATURE: 98 F | BODY MASS INDEX: 37.54 KG/M2 | HEIGHT: 61 IN | WEIGHT: 198.85 LBS | DIASTOLIC BLOOD PRESSURE: 84 MMHG | HEART RATE: 80 BPM

## 2020-08-08 LAB
GLUCOSE SERPL-MCNC: 219 MG/DL (ref 65–140)
GLUCOSE SERPL-MCNC: 243 MG/DL (ref 65–140)
GLUCOSE SERPL-MCNC: 90 MG/DL (ref 65–140)

## 2020-08-08 PROCEDURE — 82948 REAGENT STRIP/BLOOD GLUCOSE: CPT

## 2020-08-08 PROCEDURE — 99217 PR OBSERVATION CARE DISCHARGE MANAGEMENT: CPT | Performed by: FAMILY MEDICINE

## 2020-08-08 RX ORDER — ATORVASTATIN CALCIUM 10 MG/1
40 TABLET, FILM COATED ORAL DAILY
Qty: 60 TABLET | Refills: 0 | Status: SHIPPED | OUTPATIENT
Start: 2020-08-08 | End: 2020-08-11 | Stop reason: ALTCHOICE

## 2020-08-08 RX ADMIN — INSULIN LISPRO 2 UNITS: 100 INJECTION, SOLUTION INTRAVENOUS; SUBCUTANEOUS at 12:15

## 2020-08-08 RX ADMIN — METOCLOPRAMIDE HYDROCHLORIDE 10 MG: 5 INJECTION INTRAMUSCULAR; INTRAVENOUS at 09:46

## 2020-08-08 RX ADMIN — ASPIRIN 81 MG 81 MG: 81 TABLET ORAL at 09:47

## 2020-08-08 RX ADMIN — MAGNESIUM SULFATE HEPTAHYDRATE 1 G: 1 INJECTION, SOLUTION INTRAVENOUS at 09:48

## 2020-08-08 RX ADMIN — INSULIN DETEMIR 30 UNITS: 100 INJECTION, SOLUTION SUBCUTANEOUS at 09:49

## 2020-08-08 RX ADMIN — DIPHENHYDRAMINE HYDROCHLORIDE 25 MG: 50 INJECTION, SOLUTION INTRAMUSCULAR; INTRAVENOUS at 09:46

## 2020-08-08 RX ADMIN — DIPHENHYDRAMINE HYDROCHLORIDE 25 MG: 50 INJECTION, SOLUTION INTRAMUSCULAR; INTRAVENOUS at 01:06

## 2020-08-08 RX ADMIN — KETOROLAC TROMETHAMINE 30 MG: 30 INJECTION, SOLUTION INTRAMUSCULAR at 09:45

## 2020-08-08 RX ADMIN — FLUTICASONE FUROATE AND VILANTEROL TRIFENATATE 1 PUFF: 100; 25 POWDER RESPIRATORY (INHALATION) at 09:49

## 2020-08-08 RX ADMIN — VALPROATE SODIUM 1000 MG: 100 INJECTION, SOLUTION INTRAVENOUS at 01:08

## 2020-08-08 RX ADMIN — METOCLOPRAMIDE HYDROCHLORIDE 10 MG: 5 INJECTION INTRAMUSCULAR; INTRAVENOUS at 00:59

## 2020-08-08 RX ADMIN — ACETAMINOPHEN 650 MG: 325 TABLET, FILM COATED ORAL at 09:47

## 2020-08-08 RX ADMIN — KETOROLAC TROMETHAMINE 30 MG: 30 INJECTION, SOLUTION INTRAMUSCULAR at 00:59

## 2020-08-08 RX ADMIN — LISINOPRIL 20 MG: 20 TABLET ORAL at 09:47

## 2020-08-08 RX ADMIN — NICOTINE 1 PATCH: 14 PATCH TRANSDERMAL at 09:49

## 2020-08-08 RX ADMIN — FLUCONAZOLE 200 MG: 100 TABLET ORAL at 09:46

## 2020-08-08 NOTE — ASSESSMENT & PLAN NOTE
Lab Results   Component Value Date    HGBA1C 9 6 (H) 08/07/2020       Recent Labs     08/07/20  2118 08/08/20  0212 08/08/20  0713 08/08/20  1102   POCGLU 463* 243* 90 219*       Blood Sugar Average: Last 72 hrs:  (P) 244 875   · Patient reports that she has not been taking her insulin or oral antidiabetic medications due to insurance issues and inability to obtain medications  · Obtain A1c  Consider endocrinology consult pending A1c results  · Patient's home regimen is supposed to be Levemir 30 units b i d , Trulicity and metformin    · Continue home medication

## 2020-08-08 NOTE — ASSESSMENT & PLAN NOTE
· Patient reports she has only been taking her Seroquel dose daily  · Patient is post failed Lamictal, trazodone and Seroquel

## 2020-08-08 NOTE — PLAN OF CARE
Problem: Potential for Falls  Goal: Patient will remain free of falls  Description: INTERVENTIONS:  - Assess patient frequently for physical needs  -  Identify cognitive and physical deficits and behaviors that affect risk of falls    -  [place] fall precautions as indicated by assessment   - Educate patient/family on patient safety including physical limitations  - Instruct patient to call for assistance with activity based on assessment  - Modify environment to reduce risk of injury  - Consider OT/PT consult to assist with strengthening/mobility  Outcome: Progressing     Problem: PAIN - ADULT  Goal: Verbalizes/displays adequate comfort level or baseline comfort level  Description: Interventions:  - Encourage patient to monitor pain and request assistance  - Assess pain using appropriate pain scale  - Administer analgesics based on type and severity of pain and evaluate response  - Implement non-pharmacological measures as appropriate and evaluate response  - Consider cultural and social influences on pain and pain management  - Notify physician/advanced practitioner if interventions unsuccessful or patient reports new pain  Outcome: Progressing     Problem: INFECTION - ADULT  Goal: Absence or prevention of progression during hospitalization  Description: INTERVENTIONS:  - Assess and monitor for signs and symptoms of infection  - Monitor lab/diagnostic results  - Monitor all insertion sites, i e  indwelling lines, tubes, and drains  - Monitor endotracheal if appropriate and nasal secretions for changes in amount and color  - Bear appropriate cooling/warming therapies per order  - Administer medications as ordered  - Instruct and encourage patient and family to use good hand hygiene technique  - Identify and instruct in appropriate isolation precautions for identified infection/condition  Outcome: Progressing  Goal: Absence of fever/infection during neutropenic period  Description: INTERVENTIONS:  - Monitor WBC    Outcome: Progressing     Problem: SAFETY ADULT  Goal: Maintain or return to baseline ADL function  Description: INTERVENTIONS:  -  Assess patient's ability to carry out ADLs; assess patient's baseline for ADL function and identify physical deficits which impact ability to perform ADLs (bathing, care of mouth/teeth, toileting, grooming, dressing, etc )  - Assess/evaluate cause of self-care deficits   - Assess range of motion  - Assess patient's mobility; develop plan if impaired  - Assess patient's need for assistive devices and provide as appropriate  - Encourage maximum independence but intervene and supervise when necessary  - Involve family in performance of ADLs  - Assess for home care needs following discharge   - Consider OT consult to assist with ADL evaluation and planning for discharge  - Provide patient education as appropriate  Outcome: Progressing  Goal: Maintain or return mobility status to optimal level  Description: INTERVENTIONS:  - Assess patient's baseline mobility status (ambulation, transfers, stairs, etc )    - Identify cognitive and physical deficits and behaviors that affect mobility  - Identify mobility aids required to assist with transfers and/or ambulation (gait belt, sit-to-stand, lift, walker, cane, etc )  - [place] fall precautions as indicated by assessment  - Record patient progress and toleration of activity level on Mobility SBAR; progress patient to next Phase/Stage  - Instruct patient to call for assistance with activity based on assessment  - Consider rehabilitation consult to assist with strengthening/weightbearing, etc   Outcome: Progressing     Problem: DISCHARGE PLANNING  Goal: Discharge to home or other facility with appropriate resources  Description: INTERVENTIONS:  - Identify barriers to discharge w/patient and caregiver  - Arrange for needed discharge resources and transportation as appropriate  - Identify discharge learning needs (meds, wound care, etc )  - Arrange for interpretive services to assist at discharge as needed  - Refer to Case Management Department for coordinating discharge planning if the patient needs post-hospital services based on physician/advanced practitioner order or complex needs related to functional status, cognitive ability, or social support system  Outcome: Progressing     Problem: Knowledge Deficit  Goal: Patient/family/caregiver demonstrates understanding of disease process, treatment plan, medications, and discharge instructions  Description: Complete learning assessment and assess knowledge base  Interventions:  - Provide teaching at level of understanding  - Provide teaching via preferred learning methods  Outcome: Progressing     Problem: Nutrition/Hydration-ADULT  Goal: Nutrient/Hydration intake appropriate for improving, restoring or maintaining nutritional needs  Description: Monitor and assess patient's nutrition/hydration status for malnutrition  Collaborate with interdisciplinary team and initiate plan and interventions as ordered  Monitor patient's weight and dietary intake as ordered or per policy  Utilize nutrition screening tool and intervene as necessary  Determine patient's food preferences and provide high-protein, high-caloric foods as appropriate       INTERVENTIONS:  - Monitor oral intake, urinary output, labs, and treatment plans  - Assess nutrition and hydration status and recommend course of action  - Evaluate amount of meals eaten  - Assist patient with eating if necessary   - Allow adequate time for meals  - Recommend/ encourage appropriate diets, oral nutritional supplements, and vitamin/mineral supplements  - Order, calculate, and assess calorie counts as needed  - Recommend, monitor, and adjust tube feedings and TPN/PPN based on assessed needs  - Assess need for intravenous fluids  - Provide specific nutrition/hydration education as appropriate  - Include patient/family/caregiver in decisions related to nutrition  Outcome: Progressing

## 2020-08-08 NOTE — ASSESSMENT & PLAN NOTE
· Patient reports that on 08/05/2020 around 2100 she passed out after having a verbal argument  She reports that she felt very anxious and paranoid at the time  Patient reports that she struck the right side of her head on a curb off the ground  She reports that she woke up on 08/06/2020 with a headache  She presents to the ER for worsening headache  · Etiology: Vasovagal versus psychiatric source versus TIA/CVA versus seizure  · Obtain urine drug screen  · Troponin negative

## 2020-08-08 NOTE — ASSESSMENT & PLAN NOTE
· Home regimen of Advair and Proventil; however, patient has not been taking her home medications    ·

## 2020-08-08 NOTE — ASSESSMENT & PLAN NOTE
· Patient reports that she has not been taking any of her home medications except for her Seroquel  She reports that due to insurance and inability to obtain medications she has not taken her medications for several months  · Present on admission, blood pressure of 195/102  · Patient did not receive any antihypertensives in the ER  · Most recent blood pressure 160/81    · Continue home medication

## 2020-08-08 NOTE — DISCHARGE SUMMARY
Discharge- [facility] 1985, 36 y o  female MRN: [id number]    Unit/Bed#: S -01 Encounter: [id number]    Primary Care Provider: Melanie Patton DO   Date and time admitted to hospital: 8/6/2020  8:44 PM        * Headache  Assessment & Plan  · Complaints of headache after syncopal episode on 08/05/2020 which involved hitting the right side of her head  She reports double vision, right-sided paresthesia in right hand an right lower leg that started yesterday  · Patient reports history of migraines; however, patient reports that this headache feels different than previous migraines  · Patient received headache cocktail in ER without little relief  · Etiology:  Concussion related to fall versus hypertensive urgency versus migraine versus TIA/CVA  · CT of head: "No acute intracranial abnormality    · CT cervical spine: "No acute cervical spine fracture or traumatic malalignment    · Discharge home    Syncope  Assessment & Plan  · Patient reports that on 08/05/2020 around 2100 she passed out after having a verbal argument  She reports that she felt very anxious and paranoid at the time  Patient reports that she struck the right side of her head on a curb off the ground  She reports that she woke up on 08/06/2020 with a headache  She presents to the ER for worsening headache  · Etiology: Vasovagal versus psychiatric source versus TIA/CVA versus seizure  · Obtain urine drug screen  · Troponin negative  Bipolar disorder [facility])  Assessment & Plan  · Patient reports she has only been taking her Seroquel dose daily  · Patient is post failed Lamictal, trazodone and Seroquel  Nicotine dependence  Assessment & Plan  · Patient reports that she smokes about 8 cigarettes per day  · Encouraged smoking cessation  · Nicotine patch      COPD (chronic obstructive pulmonary disease) ([facility])  Assessment & Plan  · Home regimen of Advair and Proventil; however, patient has not been taking her home medications  ·     Paresthesia  Assessment & Plan  · Patient reports varices in right hand and right lower leg that started today  · Etiology:  TIA/CVA versus diabetic neuropathy  ·     Hypertensive urgency  Assessment & Plan  · Patient reports that she has not been taking any of her home medications except for her Seroquel  She reports that due to insurance and inability to obtain medications she has not taken her medications for several months  · Present on admission, blood pressure of 195/102  · Patient did not receive any antihypertensives in the ER  · Most recent blood pressure 160/81  · Continue home medication    Uncontrolled type 2 diabetes mellitus [facility])  Assessment & Plan  Lab Results   Component Value Date    HGBA1C 9 6 (H) 08/07/2020       Recent Labs     08/07/20  2118 08/08/20  0212 08/08/20  0713 08/08/20  1102   POCGLU 463* 243* 90 219*       Blood Sugar Average: Last 72 hrs:  (P) 244 875   · Patient reports that she has not been taking her insulin or oral antidiabetic medications due to insurance issues and inability to obtain medications  · Obtain A1c  Consider endocrinology consult pending A1c results  · Patient's home regimen is supposed to be Levemir 30 units b i d , Trulicity and metformin  · Continue home medication        Discharging Physician / Practitioner: Crow Pereyra MD  PCP: Fabiana Horowitz DO  Admission Date:   Admission Orders (From admission, onward)     Ordered        08/07/20 1606  Inpatient Admission  Once         08/07/20 0148  Place in Observation (expected length of stay for this patient is less than two midnights)  Once                   Discharge Date: 08/08/20    Resolved Problems  Date Reviewed: 8/8/2020    None          Consultations During Hospital Stay:  · Psyquiatry    Procedures Performed:   Significant Findings / Test Results:   · CT Head:No acute cervical spine fracture or traumatic malalignment      Incidental Findings:   ·     Test Results Pending at Discharge (will require follow up):   ·      Outpatient Tests Requested:  ·     Complications:     Reason for Admission: Headache     Hospital Course:     Rafa Post is a 36 y o  female patient who originally presented to the hospital on 8/6/2020 due to severe headache after a syncopal episode requiring pain medication  Evaluated by Neurology, no acute finding, negative imaging  Patient was evaluated by psychiatric department which recommend continue Seroquel  Patient has been clinically stable  She will be discharged home  Please see above list of diagnoses and related plan for additional information  Condition at Discharge: stable     Discharge Day Visit / Exam:     Subjective:  Headache is better  Vitals: Blood Pressure: 158/84 (08/08/20 1100)  Pulse: 80 (08/08/20 1100)  Temperature: 98 °F (36 7 °C) (08/08/20 1100)  Temp Source: Oral (08/08/20 1100)  Respirations: 17 (08/08/20 1100)  Height: 5' 1" (154 9 cm) (08/07/20 1051)  Weight - Scale: 90 2 kg (198 lb 13 7 oz) (08/07/20 1051)  SpO2: 99 % (08/08/20 1100)  Exam:   Physical Exam  Constitutional:       Appearance: Normal appearance  She is obese  HENT:      Head: Normocephalic and atraumatic  Neck:      Musculoskeletal: Normal range of motion  Cardiovascular:      Rate and Rhythm: Normal rate and regular rhythm  Pulses: Normal pulses  Heart sounds: Normal heart sounds  Pulmonary:      Effort: Pulmonary effort is normal       Breath sounds: Normal breath sounds  No stridor  No rhonchi  Abdominal:      General: Bowel sounds are normal  There is no distension  Skin:     General: Skin is warm  Neurological:      General: No focal deficit present  Mental Status: She is alert and oriented to person, place, and time  Discussion with Family:     Discharge instructions/Information to patient and family:   See after visit summary for information provided to patient and family        Provisions for Follow-Up Care:  See after visit summary for information related to follow-up care and any pertinent home health orders  Disposition:     Home    For Discharges to [facility]:   · Not Applicable to this Patient - Not Applicable to this Patient    Planned Readmission:      Discharge Statement:  I spent 30 minutes discharging the patient  This time was spent on the day of discharge  I had direct contact with the patient on the day of discharge  Greater than 50% of the total time was spent examining patient, answering all patient questions, arranging and discussing plan of care with patient as well as directly providing post-discharge instructions  Additional time then spent on discharge activities  Discharge Medications:  See after visit summary for reconciled discharge medications provided to patient and family        ** Please Note: This note has been constructed using a voice recognition system **

## 2020-08-08 NOTE — PLAN OF CARE
Problem: Potential for Falls  Goal: Patient will remain free of falls  Description: INTERVENTIONS:  - Assess patient frequently for physical needs  -  Identify cognitive and physical deficits and behaviors that affect risk of falls    -  [place] fall precautions as indicated by assessment   - Educate patient/family on patient safety including physical limitations  - Instruct patient to call for assistance with activity based on assessment  - Modify environment to reduce risk of injury  - Consider OT/PT consult to assist with strengthening/mobility  Outcome: Progressing     Problem: PAIN - ADULT  Goal: Verbalizes/displays adequate comfort level or baseline comfort level  Description: Interventions:  - Encourage patient to monitor pain and request assistance  - Assess pain using appropriate pain scale  - Administer analgesics based on type and severity of pain and evaluate response  - Implement non-pharmacological measures as appropriate and evaluate response  - Consider cultural and social influences on pain and pain management  - Notify physician/advanced practitioner if interventions unsuccessful or patient reports new pain  Outcome: Progressing     Problem: INFECTION - ADULT  Goal: Absence or prevention of progression during hospitalization  Description: INTERVENTIONS:  - Assess and monitor for signs and symptoms of infection  - Monitor lab/diagnostic results  - Monitor all insertion sites, i e  indwelling lines, tubes, and drains  - Monitor endotracheal if appropriate and nasal secretions for changes in amount and color  - [place] appropriate cooling/warming therapies per order  - Administer medications as ordered  - Instruct and encourage patient and family to use good hand hygiene technique  - Identify and instruct in appropriate isolation precautions for identified infection/condition  Outcome: Progressing  Goal: Absence of fever/infection during neutropenic period  Description: INTERVENTIONS:  - Monitor WBC    Outcome: Progressing     Problem: SAFETY ADULT  Goal: Maintain or return to baseline ADL function  Description: INTERVENTIONS:  -  Assess patient's ability to carry out ADLs; assess patient's baseline for ADL function and identify physical deficits which impact ability to perform ADLs (bathing, care of mouth/teeth, toileting, grooming, dressing, etc )  - Assess/evaluate cause of self-care deficits   - Assess range of motion  - Assess patient's mobility; develop plan if impaired  - Assess patient's need for assistive devices and provide as appropriate  - Encourage maximum independence but intervene and supervise when necessary  - Involve family in performance of ADLs  - Assess for home care needs following discharge   - Consider OT consult to assist with ADL evaluation and planning for discharge  - Provide patient education as appropriate  Outcome: Progressing  Goal: Maintain or return mobility status to optimal level  Description: INTERVENTIONS:  - Assess patient's baseline mobility status (ambulation, transfers, stairs, etc )    - Identify cognitive and physical deficits and behaviors that affect mobility  - Identify mobility aids required to assist with transfers and/or ambulation (gait belt, sit-to-stand, lift, walker, cane, etc )  - [place] fall precautions as indicated by assessment  - Record patient progress and toleration of activity level on Mobility SBAR; progress patient to next Phase/Stage  - Instruct patient to call for assistance with activity based on assessment  - Consider rehabilitation consult to assist with strengthening/weightbearing, etc   Outcome: Progressing     Problem: DISCHARGE PLANNING  Goal: Discharge to home or other facility with appropriate resources  Description: INTERVENTIONS:  - Identify barriers to discharge w/patient and caregiver  - Arrange for needed discharge resources and transportation as appropriate  - Identify discharge learning needs (meds, wound care, etc )  - Arrange for interpretive services to assist at discharge as needed  - Refer to Case Management Department for coordinating discharge planning if the patient needs post-hospital services based on physician/advanced practitioner order or complex needs related to functional status, cognitive ability, or social support system  Outcome: Progressing     Problem: Knowledge Deficit  Goal: Patient/family/caregiver demonstrates understanding of disease process, treatment plan, medications, and discharge instructions  Description: Complete learning assessment and assess knowledge base  Interventions:  - Provide teaching at level of understanding  - Provide teaching via preferred learning methods  Outcome: Progressing     Problem: Nutrition/Hydration-ADULT  Goal: Nutrient/Hydration intake appropriate for improving, restoring or maintaining nutritional needs  Description: Monitor and assess patient's nutrition/hydration status for malnutrition  Collaborate with interdisciplinary team and initiate plan and interventions as ordered  Monitor patient's weight and dietary intake as ordered or per policy  Utilize nutrition screening tool and intervene as necessary  Determine patient's food preferences and provide high-protein, high-caloric foods as appropriate       INTERVENTIONS:  - Monitor oral intake, urinary output, labs, and treatment plans  - Assess nutrition and hydration status and recommend course of action  - Evaluate amount of meals eaten  - Assist patient with eating if necessary   - Allow adequate time for meals  - Recommend/ encourage appropriate diets, oral nutritional supplements, and vitamin/mineral supplements  - Order, calculate, and assess calorie counts as needed  - Recommend, monitor, and adjust tube feedings and TPN/PPN based on assessed needs  - Assess need for intravenous fluids  - Provide specific nutrition/hydration education as appropriate  - Include patient/family/caregiver in decisions related to nutrition  Outcome: Progressing

## 2020-08-08 NOTE — ASSESSMENT & PLAN NOTE
· Patient reports varices in right hand and right lower leg that started today  · Etiology:  TIA/CVA versus diabetic neuropathy    ·

## 2020-08-08 NOTE — ASSESSMENT & PLAN NOTE
· Complaints of headache after syncopal episode on 08/05/2020 which involved hitting the right side of her head  She reports double vision, right-sided paresthesia in right hand an right lower leg that started yesterday  · Patient reports history of migraines; however, patient reports that this headache feels different than previous migraines  · Patient received headache cocktail in ER without little relief  · Etiology:  Concussion related to fall versus hypertensive urgency versus migraine versus TIA/CVA  · CT of head: "No acute intracranial abnormality    · CT cervical spine: "No acute cervical spine fracture or traumatic malalignment    · Discharge home

## 2020-08-08 NOTE — PLAN OF CARE
Problem: Potential for Falls  Goal: Patient will remain free of falls  Description: INTERVENTIONS:  - Assess patient frequently for physical needs  -  Identify cognitive and physical deficits and behaviors that affect risk of falls    -  [place] fall precautions as indicated by assessment   - Educate patient/family on patient safety including physical limitations  - Instruct patient to call for assistance with activity based on assessment  - Modify environment to reduce risk of injury  - Consider OT/PT consult to assist with strengthening/mobility  8/8/2020 1418 by Gayle Sanchez RN  Outcome: Adequate for Discharge  8/8/2020 1128 by Gayle Sanchez RN  Outcome: Progressing     Problem: PAIN - ADULT  Goal: Verbalizes/displays adequate comfort level or baseline comfort level  Description: Interventions:  - Encourage patient to monitor pain and request assistance  - Assess pain using appropriate pain scale  - Administer analgesics based on type and severity of pain and evaluate response  - Implement non-pharmacological measures as appropriate and evaluate response  - Consider cultural and social influences on pain and pain management  - Notify physician/advanced practitioner if interventions unsuccessful or patient reports new pain  8/8/2020 1418 by Gayle Sanchez RN  Outcome: Adequate for Discharge  8/8/2020 1128 by Gayle Sanchez RN  Outcome: Progressing     Problem: INFECTION - ADULT  Goal: Absence or prevention of progression during hospitalization  Description: INTERVENTIONS:  - Assess and monitor for signs and symptoms of infection  - Monitor lab/diagnostic results  - Monitor all insertion sites, i e  indwelling lines, tubes, and drains  - Monitor endotracheal if appropriate and nasal secretions for changes in amount and color  - [place] appropriate cooling/warming therapies per order  - Administer medications as ordered  - Instruct and encourage patient and family to use good hand hygiene technique  - Identify and instruct in appropriate isolation precautions for identified infection/condition  8/8/2020 1418 by Gita Le RN  Outcome: Adequate for Discharge  8/8/2020 1128 by Gita Le RN  Outcome: Progressing  Goal: Absence of fever/infection during neutropenic period  Description: INTERVENTIONS:  - Monitor WBC    8/8/2020 1418 by Gita Le RN  Outcome: Adequate for Discharge  8/8/2020 1128 by Gita Le RN  Outcome: Progressing     Problem: SAFETY ADULT  Goal: Maintain or return to baseline ADL function  Description: INTERVENTIONS:  -  Assess patient's ability to carry out ADLs; assess patient's baseline for ADL function and identify physical deficits which impact ability to perform ADLs (bathing, care of mouth/teeth, toileting, grooming, dressing, etc )  - Assess/evaluate cause of self-care deficits   - Assess range of motion  - Assess patient's mobility; develop plan if impaired  - Assess patient's need for assistive devices and provide as appropriate  - Encourage maximum independence but intervene and supervise when necessary  - Involve family in performance of ADLs  - Assess for home care needs following discharge   - Consider OT consult to assist with ADL evaluation and planning for discharge  - Provide patient education as appropriate  8/8/2020 1418 by Gita Le RN  Outcome: Adequate for Discharge  8/8/2020 1128 by Gita Le RN  Outcome: Progressing  Goal: Maintain or return mobility status to optimal level  Description: INTERVENTIONS:  - Assess patient's baseline mobility status (ambulation, transfers, stairs, etc )    - Identify cognitive and physical deficits and behaviors that affect mobility  - Identify mobility aids required to assist with transfers and/or ambulation (gait belt, sit-to-stand, lift, walker, cane, etc )  - [place] fall precautions as indicated by assessment  - Record patient progress and toleration of activity level on Mobility SBAR; progress patient to next Phase/Stage  - Instruct patient to call for assistance with activity based on assessment  - Consider rehabilitation consult to assist with strengthening/weightbearing, etc   8/8/2020 1418 by Kiara Wilhelm RN  Outcome: Adequate for Discharge  8/8/2020 1128 by Kiara Wilhelm RN  Outcome: Progressing     Problem: DISCHARGE PLANNING  Goal: Discharge to home or other facility with appropriate resources  Description: INTERVENTIONS:  - Identify barriers to discharge w/patient and caregiver  - Arrange for needed discharge resources and transportation as appropriate  - Identify discharge learning needs (meds, wound care, etc )  - Arrange for interpretive services to assist at discharge as needed  - Refer to Case Management Department for coordinating discharge planning if the patient needs post-hospital services based on physician/advanced practitioner order or complex needs related to functional status, cognitive ability, or social support system  8/8/2020 1418 by Kiara Wilhelm RN  Outcome: Adequate for Discharge  8/8/2020 1128 by Kiara Wilhelm RN  Outcome: Progressing     Problem: Knowledge Deficit  Goal: Patient/family/caregiver demonstrates understanding of disease process, treatment plan, medications, and discharge instructions  Description: Complete learning assessment and assess knowledge base  Interventions:  - Provide teaching at level of understanding  - Provide teaching via preferred learning methods  8/8/2020 1418 by Kiara Wilhelm RN  Outcome: Adequate for Discharge  8/8/2020 1128 by Kiara Wilhelm RN  Outcome: Progressing     Problem: Nutrition/Hydration-ADULT  Goal: Nutrient/Hydration intake appropriate for improving, restoring or maintaining nutritional needs  Description: Monitor and assess patient's nutrition/hydration status for malnutrition  Collaborate with interdisciplinary team and initiate plan and interventions as ordered    Monitor patient's weight and dietary intake as ordered or per policy  Utilize nutrition screening tool and intervene as necessary  Determine patient's food preferences and provide high-protein, high-caloric foods as appropriate       INTERVENTIONS:  - Monitor oral intake, urinary output, labs, and treatment plans  - Assess nutrition and hydration status and recommend course of action  - Evaluate amount of meals eaten  - Assist patient with eating if necessary   - Allow adequate time for meals  - Recommend/ encourage appropriate diets, oral nutritional supplements, and vitamin/mineral supplements  - Order, calculate, and assess calorie counts as needed  - Recommend, monitor, and adjust tube feedings and TPN/PPN based on assessed needs  - Assess need for intravenous fluids  - Provide specific nutrition/hydration education as appropriate  - Include patient/family/caregiver in decisions related to nutrition  8/8/2020 1418 by Lucero Dueñas RN  Outcome: Adequate for Discharge  8/8/2020 1128 by Lucero Dueñas, RN  Outcome: Progressing

## 2020-08-11 ENCOUNTER — OFFICE VISIT (OUTPATIENT)
Dept: FAMILY MEDICINE CLINIC | Facility: CLINIC | Age: 35
End: 2020-08-11
Payer: MEDICARE

## 2020-08-11 VITALS
SYSTOLIC BLOOD PRESSURE: 150 MMHG | HEART RATE: 82 BPM | HEIGHT: 61 IN | DIASTOLIC BLOOD PRESSURE: 86 MMHG | WEIGHT: 218.6 LBS | RESPIRATION RATE: 18 BRPM | OXYGEN SATURATION: 97 % | BODY MASS INDEX: 41.27 KG/M2 | TEMPERATURE: 98.6 F

## 2020-08-11 DIAGNOSIS — J44.9 COPD (CHRONIC OBSTRUCTIVE PULMONARY DISEASE) (HCC): ICD-10-CM

## 2020-08-11 DIAGNOSIS — E11.9 DIABETES (HCC): Primary | ICD-10-CM

## 2020-08-11 DIAGNOSIS — F31.9 BIPOLAR DISORDER (HCC): ICD-10-CM

## 2020-08-11 DIAGNOSIS — F31.9 BIPOLAR 1 DISORDER (HCC): ICD-10-CM

## 2020-08-11 DIAGNOSIS — E11.40 DIABETIC NEUROPATHY (HCC): ICD-10-CM

## 2020-08-11 DIAGNOSIS — I10 HYPERTENSION: ICD-10-CM

## 2020-08-11 PROCEDURE — 4004F PT TOBACCO SCREEN RCVD TLK: CPT | Performed by: FAMILY MEDICINE

## 2020-08-11 PROCEDURE — 1111F DSCHRG MED/CURRENT MED MERGE: CPT | Performed by: FAMILY MEDICINE

## 2020-08-11 PROCEDURE — 3008F BODY MASS INDEX DOCD: CPT | Performed by: FAMILY MEDICINE

## 2020-08-11 PROCEDURE — 3046F HEMOGLOBIN A1C LEVEL >9.0%: CPT | Performed by: FAMILY MEDICINE

## 2020-08-11 PROCEDURE — 99203 OFFICE O/P NEW LOW 30 MIN: CPT | Performed by: FAMILY MEDICINE

## 2020-08-11 PROCEDURE — 4010F ACE/ARB THERAPY RXD/TAKEN: CPT | Performed by: FAMILY MEDICINE

## 2020-08-11 RX ORDER — QUETIAPINE FUMARATE 300 MG/1
300 TABLET, FILM COATED ORAL DAILY
Qty: 30 TABLET | Refills: 0 | Status: SHIPPED | OUTPATIENT
Start: 2020-08-11 | End: 2020-11-19

## 2020-08-11 RX ORDER — GABAPENTIN 300 MG/1
300 CAPSULE ORAL 3 TIMES DAILY
Qty: 90 CAPSULE | Refills: 0 | Status: SHIPPED | OUTPATIENT
Start: 2020-08-11 | End: 2020-09-09 | Stop reason: SDUPTHER

## 2020-08-11 RX ORDER — TRAZODONE HYDROCHLORIDE 100 MG/1
200 TABLET ORAL
Qty: 60 TABLET | Refills: 0 | Status: SHIPPED | OUTPATIENT
Start: 2020-08-11 | End: 2020-11-19

## 2020-08-11 RX ORDER — LAMOTRIGINE 25 MG/1
25 TABLET ORAL DAILY
Qty: 30 TABLET | Refills: 0 | Status: SHIPPED | OUTPATIENT
Start: 2020-08-11 | End: 2021-06-25

## 2020-08-11 RX ORDER — LISINOPRIL 10 MG/1
20 TABLET ORAL DAILY
Qty: 60 TABLET | Refills: 0 | Status: SHIPPED | OUTPATIENT
Start: 2020-08-11 | End: 2020-09-17

## 2020-08-11 RX ORDER — DULAGLUTIDE 1.5 MG/.5ML
1.5 INJECTION, SOLUTION SUBCUTANEOUS WEEKLY
Qty: 2 ML | Refills: 0 | Status: SHIPPED | OUTPATIENT
Start: 2020-08-11 | End: 2020-09-25

## 2020-08-11 RX ORDER — ALBUTEROL SULFATE 90 UG/1
2 AEROSOL, METERED RESPIRATORY (INHALATION) EVERY 6 HOURS PRN
Qty: 1 INHALER | Refills: 0 | Status: SHIPPED | OUTPATIENT
Start: 2020-08-11 | End: 2021-01-04 | Stop reason: SDUPTHER

## 2020-08-11 RX ORDER — FLUCONAZOLE 150 MG/1
TABLET ORAL
COMMUNITY
Start: 2020-07-30 | End: 2021-08-22

## 2020-08-11 RX ORDER — ATORVASTATIN CALCIUM 40 MG/1
TABLET, FILM COATED ORAL
COMMUNITY
Start: 2020-08-08

## 2020-08-11 NOTE — ASSESSMENT & PLAN NOTE
Lab Results   Component Value Date    HGBA1C 9 6 (H) 16/54/7726     · Complicated by diabetic neuropathy  · Diabetes poorly controlled, likely due to poor compliance     · Represcribe medications  · Patient to log sugars, and will discuss at next visit in 2 weeks  · At this visit, will refer to podiatry and opthalmology
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
## 2020-08-11 NOTE — ASSESSMENT & PLAN NOTE
· blood pressure today 150/86, poorly controlled  Likely due to poor compliance, also concurrent tobacco use and h/o ANNA  Would like to quit, but she is not sure insurance will cover the patch  In the contemplative stage  Not endorsing symptoms of hypertensive urgency      · Refill lisinopril  · Follow up in 2 weeks  · Encourage tobacco cessation; discuss further at next visit  · Patient will need referral to sleep medicine to renew CPAP

## 2020-08-11 NOTE — PATIENT INSTRUCTIONS
-Follow up in 2 weeks  -We will place referral for podiatry and ophthalmology then  -Take log of blood sugars for the next couple weeks: first thing in the morning before you eat, and 2 hours after your largest meal  We will discuss at next visit
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
## 2020-08-12 NOTE — PHYSICIAN ADVISOR
Current patient class: Inpatient  The patient is currently on Hospital Day: 3 at [facility]      The patient was admitted to the hospital at [phone] on 8/7/20 for the following diagnosis:  Syncope [R55]  Head injury [S09 90XA]  Abnormal EKG [R94 31]  Intractable headache [R51]       There is documentation in the medical record of an expected length of stay of at least 2 midnights  The patient is therefore expected to satisfy the 2 midnight benchmark and given the 2 midnight presumption is appropriate for INPATIENT ADMISSION  Given this expectation of a satisfying stay, CMS instructs us that the patient is most often appropriate for inpatient admission under part A provided medical necessity is documented in the chart  After review of the relevant documentation, labs, vital signs and test results, the patient is appropriate for INPATIENT ADMISSION  Admission to the hospital as an inpatient is a complex decision making process which requires the practitioner to consider the patients presenting complaint, history and physical examination and all relevant testing  With this in mind, in this case, the patient was deemed appropriate for INPATIENT ADMISSION  After review of the documentation and testing available at the time of the admission I concur with this clinical determination of medical necessity  Rationale is as follows: The patient is a 36 yrs old Female who presented to the ED at 8/6/2020  8:44 PM with a chief complaint of Headache (PT presents w/severe H/A, fell yesterday hit right side of head  -thinners HX HTN,DM,COPD)  The patient did have a that syncopal episode then had the intractable headache  The patient came to the emergency department  On day 2 of admission the patient was receiving IV Toradol  They did also recommend an MRI  On day 2 of admission the patient was awaiting the MRI as well as neurology evaluation for recommendations    Given the need for further diagnostic studies in a patient came intractable headaches who needed IV medications the patient did cross the 2 midnight benchmark set by Medicare and is inpatient status appropriate  The patients vitals on arrival were   ED Triage Vitals   Temperature Pulse Respirations Blood Pressure SpO2   20   98 5 °F (36 9 °C) 60 18 (!) 195/102 99 %      Temp Source Heart Rate Source Patient Position - Orthostatic VS BP Location FiO2 (%)   20 --   Oral Monitor Lying Left arm       Pain Score       20       Worst Possible Pain           Past Medical History:   Diagnosis Date    Asthma     Bipolar [facility] disorder ([facility]     [facility]     Depression     Diabetes mellitus ([facility] )     Hypertension     Psychiatric disorder     PTSD (post-traumatic stress disorder)     Tendonitis      Past Surgical History:   Procedure Laterality Date     SECTION      EAR SURGERY             Consults have been placed to:   IP CONSULT TO NEUROLOGY  IP CONSULT TO NUTRITION SERVICES  IP CONSULT TO PSYCHIATRY    Vitals:    20 2246 20 0300 20 0700 20 1100   BP: (!) 171/86 131/79 165/85 158/84   BP Location: Right arm Left arm Left arm Left arm   Pulse: 84 73 79 80   Resp: 18 18 18 17   Temp: (!) 97 4 °F (36 3 °C) 98 5 °F (36 9 °C) 98 °F (36 7 °C) 98 °F (36 7 °C)   TempSrc: Oral Oral Oral Oral   SpO2: 97% 98% 97% 99%   Weight:       Height:           Most recent labs:    No results for input(s): WBC, HGB, HCT, PLT, K, NA, CALCIUM, BUN, CREATININE, LIPASE, AMYLASE, INR, TROPONINI, CKTOTAL, AST, ALT, ALKPHOS, BILITOT in the last 72 hours  Scheduled Meds:  Continuous Infusions:No current facility-administered medications for this encounter  PRN Meds:      Surgical procedures (if appropriate):

## 2020-08-13 ENCOUNTER — TELEPHONE (OUTPATIENT)
Dept: FAMILY MEDICINE CLINIC | Facility: CLINIC | Age: 35
End: 2020-08-13

## 2020-08-13 DIAGNOSIS — E11.65 UNCONTROLLED TYPE 2 DIABETES MELLITUS WITH HYPERGLYCEMIA (HCC): Primary | ICD-10-CM

## 2020-08-13 DIAGNOSIS — E11.65 UNCONTROLLED TYPE 2 DIABETES MELLITUS WITH HYPERGLYCEMIA (HCC): ICD-10-CM

## 2020-08-13 NOTE — TELEPHONE ENCOUNTER
Medicare part B forms needing to be completed placed in Dr Colvin Sessions folder in preceptor room   Thank you

## 2020-08-14 NOTE — PROGRESS NOTES
Reviewed prior endocrinology note from 5/15/2019, which stated that patient should take lantus 26 U twice daily due to formulary switch from levemir  We will start with this medication for her insulin, and adjust as needed based on blood sugar log and A1C   Discussed with pharmacist

## 2020-08-15 RX ORDER — LANCETS
EACH MISCELLANEOUS
Qty: 100 EACH | Refills: 0 | OUTPATIENT
Start: 2020-08-15

## 2020-08-15 RX ORDER — INSULIN DETEMIR 100 [IU]/ML
INJECTION, SOLUTION SUBCUTANEOUS
Refills: 0 | OUTPATIENT
Start: 2020-08-15

## 2020-09-09 DIAGNOSIS — J44.9 COPD (CHRONIC OBSTRUCTIVE PULMONARY DISEASE) (HCC): ICD-10-CM

## 2020-09-09 DIAGNOSIS — E11.9 DIABETES (HCC): ICD-10-CM

## 2020-09-14 RX ORDER — GABAPENTIN 300 MG/1
300 CAPSULE ORAL 3 TIMES DAILY
Qty: 90 CAPSULE | Refills: 0 | Status: SHIPPED | OUTPATIENT
Start: 2020-09-14 | End: 2020-10-13 | Stop reason: SDUPTHER

## 2020-09-15 ENCOUNTER — APPOINTMENT (EMERGENCY)
Dept: RADIOLOGY | Facility: HOSPITAL | Age: 35
End: 2020-09-15
Payer: MEDICARE

## 2020-09-15 ENCOUNTER — HOSPITAL ENCOUNTER (EMERGENCY)
Facility: HOSPITAL | Age: 35
Discharge: HOME | End: 2020-09-15
Attending: EMERGENCY MEDICINE | Admitting: EMERGENCY MEDICINE
Payer: MEDICARE

## 2020-09-15 VITALS
HEART RATE: 90 BPM | OXYGEN SATURATION: 98 % | TEMPERATURE: 98.1 F | WEIGHT: 218 LBS | BODY MASS INDEX: 41.16 KG/M2 | HEIGHT: 61 IN | SYSTOLIC BLOOD PRESSURE: 130 MMHG | DIASTOLIC BLOOD PRESSURE: 80 MMHG | RESPIRATION RATE: 18 BRPM

## 2020-09-15 DIAGNOSIS — L03.032 CELLULITIS OF TOE OF LEFT FOOT: Primary | ICD-10-CM

## 2020-09-15 PROCEDURE — 99283 EMERGENCY DEPT VISIT LOW MDM: CPT

## 2020-09-15 PROCEDURE — 73660 X-RAY EXAM OF TOE(S): CPT

## 2020-09-15 PROCEDURE — 96372 THER/PROPH/DIAG INJ SC/IM: CPT

## 2020-09-15 PROCEDURE — 99284 EMERGENCY DEPT VISIT MOD MDM: CPT | Performed by: EMERGENCY MEDICINE

## 2020-09-15 RX ORDER — KETOROLAC TROMETHAMINE 30 MG/ML
30 INJECTION, SOLUTION INTRAMUSCULAR; INTRAVENOUS ONCE
Status: COMPLETED | OUTPATIENT
Start: 2020-09-15 | End: 2020-09-15

## 2020-09-15 RX ORDER — SULFAMETHOXAZOLE AND TRIMETHOPRIM 800; 160 MG/1; MG/1
1 TABLET ORAL 2 TIMES DAILY
Qty: 14 TABLET | Refills: 0 | Status: SHIPPED | OUTPATIENT
Start: 2020-09-15 | End: 2020-09-22

## 2020-09-15 RX ORDER — SULFAMETHOXAZOLE AND TRIMETHOPRIM 800; 160 MG/1; MG/1
1 TABLET ORAL ONCE
Status: COMPLETED | OUTPATIENT
Start: 2020-09-15 | End: 2020-09-15

## 2020-09-15 RX ADMIN — SULFAMETHOXAZOLE AND TRIMETHOPRIM 1 TABLET: 800; 160 TABLET ORAL at 17:26

## 2020-09-15 RX ADMIN — KETOROLAC TROMETHAMINE 30 MG: 30 INJECTION, SOLUTION INTRAMUSCULAR at 16:18

## 2020-09-15 NOTE — ED NOTES
Pt reports she is ready for discharge   Dr Kiersten Eaton aware     Odette Murray, RN  09/15/20 [id number]

## 2020-09-15 NOTE — ED PROVIDER NOTES
History  Chief Complaint   Patient presents with    Toe Pain     left 2nd toe burning pain x 1 month, worse x 2 weeks  history of diabetes  This is a 69-year-old female presents to the emergency department complaining of left 2nd toe pain  Patient states the pain has been ongoing for approximately 1 month  She states it has gotten worse over the last 2 weeks  She states that the pain is sharp and stabbing to the left toe  It does not radiate  Nothing makes it better, walking makes it worse  The patient denies any fevers  The patient denies any prior history of infection to the toe  The patient does not see a podiatrist   Patient denies nausea or vomiting  The patient denies any additional issues to her feet  Prior to Admission Medications   Prescriptions Last Dose Informant Patient Reported? Taking?    ASPIRIN 81 PO Not Taking at Unknown time  Yes No   Sig: Take 81 mg by mouth daily   Dulaglutide (Trulicity) 1 5 EP/5 1YB SOPN   No No   Sig: Inject 0 5 mL (1 5 mg total) under the skin once a week   Insulin Pen Needle 31G X 6 MM MISC   No No   Sig: by Does not apply route 2 (two) times a day   Lancets (accu-chek soft touch) lancets   No No   Sig: Use as instructed   QUEtiapine (SEROquel) 300 mg tablet 9/14/2020 at Unknown time  No Yes   Sig: Take 1 tablet (300 mg total) by mouth daily   albuterol (PROVENTIL HFA,VENTOLIN HFA) 90 mcg/act inhaler Unknown at Unknown time  No No   Sig: Inhale 2 puffs every 6 (six) hours as needed for wheezing or shortness of breath   atorvastatin (LIPITOR) 40 mg tablet 9/15/2020 at Unknown time  Yes Yes   fluconazole (DIFLUCAN) 150 mg tablet   Yes No   fluticasone-salmeterol (ADVAIR, WIXELA) 100-50 mcg/dose inhaler 9/15/2020 at Unknown time  No Yes   Sig: Inhale 1 puff 2 (two) times a day Rinse mouth after use    gabapentin (NEURONTIN) 300 mg capsule Past Month at Unknown time  No Yes   Sig: Take 1 capsule (300 mg total) by mouth 3 (three) times a day   glucagon 1 MG injection   No No   Sig: Inject 1 mg under the skin once as needed for low blood sugar for up to 1 dose Inject 1mg under the skin as needed for blood sugar <70 for up to 1 dose if unable to swallow   glucose blood test strip 9/15/2020 at Unknown time Self Yes Yes   Sig: Use to check BG up to 3x daily  On insulin  e11 40   insulin detemir (LEVEMIR) 100 units/mL subcutaneous injection   No No   Sig: Inject 30 Units under the skin 2 (two) times a day   insulin lispro (HumaLOG) 100 units/mL injection  Self Yes No   Sig: Inject under the skin 2 (two) times a day   lamoTRIgine (LaMICtal) 25 mg tablet 2020 at Unknown time  No Yes   Sig: Take 1 tablet (25 mg total) by mouth daily   lisinopril (ZESTRIL) 10 mg tablet 2020 at Unknown time  No Yes   Sig: Take 2 tablets (20 mg total) by mouth daily   metFORMIN (GLUCOPHAGE) 1000 MG tablet 2020 at Unknown time  No Yes   Sig: Take 1 tablet (1,000 mg total) by mouth 2 (two) times a day with meals   traZODone (DESYREL) 100 mg tablet 2020 at Unknown time  No Yes   Sig: Take 2 tablets (200 mg total) by mouth daily at bedtime      Facility-Administered Medications: None       Past Medical History:   Diagnosis Date    Asthma     Bipolar 1 disorder (HCC)     [facility]     Depression     Diabetes mellitus ([facility] )     Hypertension     Psychiatric disorder     PTSD (post-traumatic stress disorder)     Tendonitis        Past Surgical History:   Procedure Laterality Date     SECTION      EAR SURGERY         Family History   Problem Relation Age of Onset    Hypertension Mother     Diabetes Mother     HIV Mother     Heart disease Mother     No Known Problems Father      I have reviewed and agree with the history as documented      E-Cigarette/Vaping    E-Cigarette Use Never User      E-Cigarette/Vaping Substances    Nicotine No     THC No     CBD No     Flavoring No     Other No     Unknown No      Social History     Tobacco Use    Smoking status: Current Every Day Smoker     Packs/day: 0 50     Types: Cigarettes    Smokeless tobacco: Never Used   Substance Use Topics    Alcohol use: Not Currently    Drug use: Not Currently     Types: Cocaine, Marijuana     Comment: 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       Review of Systems   All other systems reviewed and are negative        Physical Exam  Physical Exam  Constitutional: Vital signs reviewed, patient well-appearing, nontoxic  Eyes: Extraocular movements intact   HEENT: Trachea midline, no JVD, moist mucous membranes   Respiratory: Equal chest expansion   Cardiovascular: Well perfused   Abdomen: No distension   Extremities:  Mild swelling to the left 2nd toe, mild erythema, full range of motion of the toe, no puncture wounds noted   Neuro: awake, alert, oriented, no focal weakness   Skin: warm, dry, intact, no rashes noted     Vital Signs  ED Triage Vitals [09/15/20 1604]   Temperature Pulse Respirations Blood Pressure SpO2   98 1 °F (36 7 °C) 90 18 130/80 98 %      Temp src Heart Rate Source Patient Position - Orthostatic VS BP Location FiO2 (%)   -- -- Sitting Left arm --      Pain Score       8           Vitals:    09/15/20 1604   BP: 130/80   Pulse: 90   Patient Position - Orthostatic VS: Sitting         Visual Acuity      ED Medications  Medications   ketorolac (TORADOL) injection 30 mg (30 mg Intramuscular Given 9/15/20 1618)   sulfamethoxazole-trimethoprim (BACTRIM DS) 800-160 mg per tablet 1 tablet (1 tablet Oral Given 9/15/20 1726)       Diagnostic Studies  Results Reviewed     None                 XR toe second min 2 views LEFT   ED Interpretation by Katherine Sinclair DO (09/15 1724)   No fracture, no obvious signs of infection                 Procedures  Procedures         ED Course  ED Course as of Sep 15 1743   Tue Sep 15, 2020   1724 The patient had an x-ray of her left 2nd toe that was interpreted by me that shows no acute fracture or signs of infection  1724 The patient was given Toradol for pain and then Bactrim for infection  The patient was discharged with follow-up to Podiatry  MDM  Number of Diagnoses or Management Options  Diagnosis management comments: This is a 78-year-old female presented to the emergency department complaining of left 2nd toe pain  I considered infection, osteomyelitis, trauma  These and other diagnoses were considered  Amount and/or Complexity of Data Reviewed  Tests in the radiology section of CPT®: ordered and reviewed  Independent visualization of images, tracings, or specimens: yes        Disposition  Final diagnoses:   Cellulitis of toe of left foot     Time reflects when diagnosis was documented in both MDM as applicable and the Disposition within this note     Time User Action Codes Description Comment    9/15/2020  5:23 PM Dain Rivers Add [L03 032] Cellulitis of toe of left foot       ED Disposition     ED Disposition Condition Date/Time Comment    Discharge Stable Tue Sep 15, 2020  5:22 PM Carol Ocampo discharge to home/self care              Follow-up Information     Follow up With Specialties Details Why Kelly Westfall   Call for a podiatrist [phone]            Discharge Medication List as of 9/15/2020  5:24 PM      START taking these medications    Details   sulfamethoxazole-trimethoprim (BACTRIM DS) 800-160 mg per tablet Take 1 tablet by mouth 2 (two) times a day for 7 days smx-tmp DS (BACTRIM) 800-160 mg tabs (1tab q12 D10), Starting Tue 9/15/2020, Until Tue 9/22/2020, Normal         CONTINUE these medications which have NOT CHANGED    Details   atorvastatin (LIPITOR) 40 mg tablet Starting Sat 8/8/2020, Historical Med      fluticasone-salmeterol (ADVAIR, WIXELA) 100-50 mcg/dose inhaler Inhale 1 puff 2 (two) times a day Rinse mouth after use , Starting Mon 9/14/2020, Until Wed 10/14/2020, Normal      gabapentin (NEURONTIN) 300 mg capsule Take 1 capsule (300 mg total) by mouth 3 (three) times a day, Starting Mon 9/14/2020, Until Wed 10/14/2020, Normal      glucose blood test strip Use to check BG up to 3x daily   On insulin  e11 40, Historical Med      lamoTRIgine (LaMICtal) 25 mg tablet Take 1 tablet (25 mg total) by mouth daily, Starting Tue 8/11/2020, Until Tue 9/15/2020, Normal      lisinopril (ZESTRIL) 10 mg tablet Take 2 tablets (20 mg total) by mouth daily, Starting Tue 8/11/2020, Until Tue 9/15/2020, Normal      metFORMIN (GLUCOPHAGE) 1000 MG tablet Take 1 tablet (1,000 mg total) by mouth 2 (two) times a day with meals, Starting Tue 8/11/2020, Until Tue 9/15/2020, Normal      QUEtiapine (SEROquel) 300 mg tablet Take 1 tablet (300 mg total) by mouth daily, Starting Tue 8/11/2020, Until Tue 9/15/2020, Normal      traZODone (DESYREL) 100 mg tablet Take 2 tablets (200 mg total) by mouth daily at bedtime, Starting Tue 8/11/2020, Until Tue 9/15/2020, Normal      albuterol (PROVENTIL HFA,VENTOLIN HFA) 90 mcg/act inhaler Inhale 2 puffs every 6 (six) hours as needed for wheezing or shortness of breath, Starting Tue 8/11/2020, Until Wed 8/11/2021, Normal      ASPIRIN 81 PO Take 81 mg by mouth daily, Historical Med      Dulaglutide (Trulicity) 1 5 WM/0 8GJ SOPN Inject 0 5 mL (1 5 mg total) under the skin once a week, Starting Tue 8/11/2020, Until Thu 9/10/2020, Normal      fluconazole (DIFLUCAN) 150 mg tablet Starting Thu 7/30/2020, Historical Med      glucagon 1 MG injection Inject 1 mg under the skin once as needed for low blood sugar for up to 1 dose Inject 1mg under the skin as needed for blood sugar <70 for up to 1 dose if unable to swallow, Starting Thu 8/13/2020, Normal      insulin detemir (LEVEMIR) 100 units/mL subcutaneous injection Inject 30 Units under the skin 2 (two) times a day, Starting Thu 8/13/2020, Until Sat 9/12/2020, Normal      insulin lispro (HumaLOG) 100 units/mL injection Inject under the skin 2 (two) times a day, Historical Med      Insulin Pen Needle 31G X 6 MM MISC by Does not apply route 2 (two) times a day, Starting u 8/13/2020, Normal      Lancets (accu-chek soft touch) lancets Use as instructed, Normal           No discharge procedures on file      PDMP Review     None          ED Provider  Electronically Signed by           Vidya Beyer DO  09/15/20 0890

## 2020-09-16 DIAGNOSIS — I10 HYPERTENSION: ICD-10-CM

## 2020-09-17 RX ORDER — LISINOPRIL 10 MG/1
TABLET ORAL
Qty: 180 TABLET | Refills: 0 | Status: SHIPPED | OUTPATIENT
Start: 2020-09-17 | End: 2021-08-06

## 2020-09-21 DIAGNOSIS — E11.9 DIABETES (HCC): ICD-10-CM

## 2020-09-22 ENCOUNTER — HOSPITAL ENCOUNTER (EMERGENCY)
Facility: HOSPITAL | Age: 35
Discharge: HOME | End: 2020-09-22
Attending: EMERGENCY MEDICINE | Admitting: EMERGENCY MEDICINE
Payer: MEDICARE

## 2020-09-22 ENCOUNTER — APPOINTMENT (EMERGENCY)
Dept: CT IMAGING | Facility: HOSPITAL | Age: 35
End: 2020-09-22
Payer: MEDICARE

## 2020-09-22 VITALS
OXYGEN SATURATION: 100 % | TEMPERATURE: 97.9 F | RESPIRATION RATE: 18 BRPM | SYSTOLIC BLOOD PRESSURE: 155 MMHG | WEIGHT: 218 LBS | BODY MASS INDEX: 41.19 KG/M2 | DIASTOLIC BLOOD PRESSURE: 81 MMHG | HEART RATE: 95 BPM

## 2020-09-22 DIAGNOSIS — L08.9 TOE INFECTION: ICD-10-CM

## 2020-09-22 DIAGNOSIS — L03.90 CELLULITIS: Primary | ICD-10-CM

## 2020-09-22 LAB
ALBUMIN SERPL BCP-MCNC: 3.6 G/DL (ref 3.4–4.8)
ALP SERPL-CCNC: 102.4 U/L (ref 35–140)
ALT SERPL W P-5'-P-CCNC: 22 U/L (ref 5–54)
ANION GAP SERPL CALCULATED.3IONS-SCNC: 7 MMOL/L (ref 4–13)
AST SERPL W P-5'-P-CCNC: 18 U/L (ref 15–41)
BASOPHILS # BLD AUTO: 0.07 THOUSANDS/ΜL (ref 0–0.1)
BASOPHILS NFR BLD AUTO: 1 % (ref 0–1)
BILIRUB SERPL-MCNC: 0.18 MG/DL (ref 0.3–1.2)
BUN SERPL-MCNC: 26 MG/DL (ref 6–20)
CALCIUM SERPL-MCNC: 8.3 MG/DL (ref 8.4–10.2)
CHLORIDE SERPL-SCNC: 104 MMOL/L (ref 96–108)
CO2 SERPL-SCNC: 25 MMOL/L (ref 22–33)
CREAT SERPL-MCNC: 0.72 MG/DL (ref 0.4–1.1)
EOSINOPHIL # BLD AUTO: 0.31 THOUSAND/ΜL (ref 0–0.61)
EOSINOPHIL NFR BLD AUTO: 4 % (ref 0–6)
ERYTHROCYTE [DISTWIDTH] IN BLOOD BY AUTOMATED COUNT: 14 % (ref 11.6–15.1)
EXT PREG TEST URINE: NEGATIVE
EXT. CONTROL ED NAV: NORMAL
GFR SERPL CREATININE-BSD FRML MDRD: 127 ML/MIN/1.73SQ M
GLUCOSE SERPL-MCNC: 253 MG/DL (ref 65–140)
HCT VFR BLD AUTO: 37.8 % (ref 34.8–46.1)
HGB BLD-MCNC: 13.7 G/DL (ref 11.5–15.4)
IMM GRANULOCYTES # BLD AUTO: 0.03 THOUSAND/UL (ref 0–0.2)
IMM GRANULOCYTES NFR BLD AUTO: 0 % (ref 0–2)
LACTATE SERPL-SCNC: 1.3 MMOL/L (ref 0–2)
LYMPHOCYTES # BLD AUTO: 3.33 THOUSANDS/ΜL (ref 0.6–4.47)
LYMPHOCYTES NFR BLD AUTO: 38 % (ref 14–44)
MCH RBC QN AUTO: 27.7 PG (ref 26.8–34.3)
MCHC RBC AUTO-ENTMCNC: 36.2 G/DL (ref 31.4–37.4)
MCV RBC AUTO: 77 FL (ref 82–98)
MONOCYTES # BLD AUTO: 0.43 THOUSAND/ΜL (ref 0.17–1.22)
MONOCYTES NFR BLD AUTO: 5 % (ref 4–12)
NEUTROPHILS # BLD AUTO: 4.63 THOUSANDS/ΜL (ref 1.85–7.62)
NEUTS SEG NFR BLD AUTO: 52 % (ref 43–75)
PLATELET # BLD AUTO: 328 THOUSANDS/UL (ref 149–390)
PMV BLD AUTO: 10.6 FL (ref 8.9–12.7)
POTASSIUM SERPL-SCNC: 4.5 MMOL/L (ref 3.5–5)
PROT SERPL-MCNC: 6.2 G/DL (ref 6.4–8.3)
RBC # BLD AUTO: 4.94 MILLION/UL (ref 3.81–5.12)
SODIUM SERPL-SCNC: 136 MMOL/L (ref 133–145)
WBC # BLD AUTO: 8.8 THOUSAND/UL (ref 4.31–10.16)

## 2020-09-22 PROCEDURE — 99284 EMERGENCY DEPT VISIT MOD MDM: CPT

## 2020-09-22 PROCEDURE — 96375 TX/PRO/DX INJ NEW DRUG ADDON: CPT

## 2020-09-22 PROCEDURE — 80053 COMPREHEN METABOLIC PANEL: CPT | Performed by: EMERGENCY MEDICINE

## 2020-09-22 PROCEDURE — 73701 CT LOWER EXTREMITY W/DYE: CPT

## 2020-09-22 PROCEDURE — 96365 THER/PROPH/DIAG IV INF INIT: CPT

## 2020-09-22 PROCEDURE — 96366 THER/PROPH/DIAG IV INF ADDON: CPT

## 2020-09-22 PROCEDURE — 85025 COMPLETE CBC W/AUTO DIFF WBC: CPT | Performed by: EMERGENCY MEDICINE

## 2020-09-22 PROCEDURE — 99284 EMERGENCY DEPT VISIT MOD MDM: CPT | Performed by: EMERGENCY MEDICINE

## 2020-09-22 PROCEDURE — 87040 BLOOD CULTURE FOR BACTERIA: CPT | Performed by: EMERGENCY MEDICINE

## 2020-09-22 PROCEDURE — 83605 ASSAY OF LACTIC ACID: CPT | Performed by: EMERGENCY MEDICINE

## 2020-09-22 PROCEDURE — 81025 URINE PREGNANCY TEST: CPT | Performed by: EMERGENCY MEDICINE

## 2020-09-22 PROCEDURE — 36415 COLL VENOUS BLD VENIPUNCTURE: CPT | Performed by: EMERGENCY MEDICINE

## 2020-09-22 RX ORDER — DOXYCYCLINE HYCLATE 100 MG/1
100 CAPSULE ORAL 2 TIMES DAILY
Qty: 20 CAPSULE | Refills: 0 | Status: SHIPPED | OUTPATIENT
Start: 2020-09-22 | End: 2020-10-02

## 2020-09-22 RX ORDER — HYDROCODONE BITARTRATE AND ACETAMINOPHEN 5; 325 MG/1; MG/1
1 TABLET ORAL ONCE
Status: COMPLETED | OUTPATIENT
Start: 2020-09-22 | End: 2020-09-22

## 2020-09-22 RX ORDER — IBUPROFEN 600 MG/1
600 TABLET ORAL EVERY 6 HOURS PRN
Qty: 60 TABLET | Refills: 0 | Status: SHIPPED | OUTPATIENT
Start: 2020-09-22 | End: 2020-10-13 | Stop reason: SDUPTHER

## 2020-09-22 RX ORDER — KETOROLAC TROMETHAMINE 30 MG/ML
30 INJECTION, SOLUTION INTRAMUSCULAR; INTRAVENOUS ONCE
Status: COMPLETED | OUTPATIENT
Start: 2020-09-22 | End: 2020-09-22

## 2020-09-22 RX ORDER — VANCOMYCIN HYDROCHLORIDE 1 G/200ML
15 INJECTION, SOLUTION INTRAVENOUS ONCE
Status: COMPLETED | OUTPATIENT
Start: 2020-09-22 | End: 2020-09-22

## 2020-09-22 RX ADMIN — KETOROLAC TROMETHAMINE 30 MG: 30 INJECTION, SOLUTION INTRAMUSCULAR at 18:22

## 2020-09-22 RX ADMIN — SODIUM CHLORIDE 1000 ML: 0.9 INJECTION, SOLUTION INTRAVENOUS at 18:06

## 2020-09-22 RX ADMIN — IOHEXOL 100 ML: 350 INJECTION, SOLUTION INTRAVENOUS at 19:03

## 2020-09-22 RX ADMIN — VANCOMYCIN HYDROCHLORIDE 1000 MG: 1 INJECTION, SOLUTION INTRAVENOUS at 18:32

## 2020-09-22 RX ADMIN — HYDROCODONE BITARTRATE AND ACETAMINOPHEN 1 TABLET: 5; 325 TABLET ORAL at 18:31

## 2020-09-22 RX ADMIN — SODIUM CHLORIDE 1000 ML: 0.9 INJECTION, SOLUTION INTRAVENOUS at 19:05

## 2020-09-22 NOTE — DISCHARGE INSTRUCTIONS
I have referred you to a podiatrist for follow up - you will be receiving a call from [facility] to assist you in scheduling an infection

## 2020-09-22 NOTE — ED NOTES
Patient transported to [address] [place] [address], [address] [place] [address]  09/22/20 2253

## 2020-09-22 NOTE — ED PROVIDER NOTES
History  Chief Complaint   Patient presents with    Toe Pain     1 month with toe pain, missed appt with PCP yesterday  This is a 36year old female with a PMH of insulin dependant diabetes that presented to the ED via EMS this afternoon reporting she has pain of her left 2nd toe    Reports that she developed an infection of the toe appx 2 weeks and finished a course of Bactrim with no relief     She is an insulin dependant diabetic - reports that her glucose has been elevated at 400 today - reports that when the infection initially started her glucose was high however she was then able to control it until today    Reports that the pain and swelling never improved with the Bactrim - now the toe is swollen and there has been benito discharge from the corner area of her toe  No aggravating or alleviating factors    Has a past hx of narcotic addiction however reports that she has been "clean" for 3 years - denies use of suboxone              Prior to Admission Medications   Prescriptions Last Dose Informant Patient Reported? Taking?    ASPIRIN 81 PO Not Taking at Unknown time  Yes No   Sig: Take 81 mg by mouth daily   Dulaglutide (Trulicity) 1 5 TV/6 6DF SOPN   No No   Sig: Inject 0 5 mL (1 5 mg total) under the skin once a week   Insulin Pen Needle 31G X 6 MM MISC   No Yes   Sig: by Does not apply route 2 (two) times a day   Lancets (accu-chek soft touch) lancets   No Yes   Sig: Use as instructed   QUEtiapine (SEROquel) 300 mg tablet   No No   Sig: Take 1 tablet (300 mg total) by mouth daily   albuterol (PROVENTIL HFA,VENTOLIN HFA) 90 mcg/act inhaler   No Yes   Sig: Inhale 2 puffs every 6 (six) hours as needed for wheezing or shortness of breath   atorvastatin (LIPITOR) 40 mg tablet   Yes Yes   fluconazole (DIFLUCAN) 150 mg tablet   Yes Yes   fluticasone-salmeterol (ADVAIR, WIXELA) 100-50 mcg/dose inhaler   No Yes   Sig: Inhale 1 puff 2 (two) times a day Rinse mouth after use    gabapentin (NEURONTIN) 300 mg capsule   No Yes   Sig: Take 1 capsule (300 mg total) by mouth 3 (three) times a day   glucagon 1 MG injection   No Yes   Sig: Inject 1 mg under the skin once as needed for low blood sugar for up to 1 dose Inject 1mg under the skin as needed for blood sugar <70 for up to 1 dose if unable to swallow   glucose blood test strip  Self Yes Yes   Sig: Use to check BG up to 3x daily  On insulin  e11 40   insulin detemir (LEVEMIR) 100 units/mL subcutaneous injection   No No   Sig: Inject 30 Units under the skin 2 (two) times a day   insulin lispro (HumaLOG) 100 units/mL injection  Self Yes Yes   Sig: Inject under the skin 2 (two) times a day   lamoTRIgine (LaMICtal) 25 mg tablet   No No   Sig: Take 1 tablet (25 mg total) by mouth daily   lisinopril (ZESTRIL) 10 mg tablet   No Yes   Sig: TAKE 2 TABLETS BY MOUTH DAILY   metFORMIN (GLUCOPHAGE) 1000 MG tablet   No No   Sig: Take 1 tablet (1,000 mg total) by mouth 2 (two) times a day with meals   sulfamethoxazole-trimethoprim (BACTRIM DS) 800-160 mg per tablet   No Yes   Sig: Take 1 tablet by mouth 2 (two) times a day for 7 days smx-tmp DS (BACTRIM) 800-160 mg tabs (1tab q12 D10)   traZODone (DESYREL) 100 mg tablet   No No   Sig: Take 2 tablets (200 mg total) by mouth daily at bedtime      Facility-Administered Medications: None       Past Medical History:   Diagnosis Date    Asthma     Bipolar 1 disorder (HCC)     COPD (chronic obstructive pulmonary disease) (HCC)     Depression     Diabetes mellitus (HCC)     Hypertension     Psychiatric disorder     PTSD (post-traumatic stress disorder)     Tendonitis        Past Surgical History:   Procedure Laterality Date     SECTION      EAR SURGERY         Family History   Problem Relation Age of Onset    Hypertension Mother     Diabetes Mother     HIV Mother     Heart disease Mother     No Known Problems Father      I have reviewed and agree with the history as documented      E-Cigarette/Vaping    E-Cigarette Use Never User      E-Cigarette/Vaping Substances    Nicotine No     THC No     CBD No     Flavoring No     Other No     Unknown No      Social History     Tobacco Use    Smoking status: Current Every Day Smoker     Packs/day: 0 50     Types: Cigarettes    Smokeless tobacco: Never Used   Substance Use Topics    Alcohol use: Not Currently    Drug use: Not Currently     Types: Cocaine, Marijuana     Comment: 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       Review of Systems   Constitutional: Negative for activity change, appetite change, chills, diaphoresis, fatigue, fever and unexpected weight change  HENT: Negative for congestion, ear discharge, ear pain, mouth sores, sinus pressure, sinus pain, sneezing, sore throat, trouble swallowing and voice change  Eyes: Negative for photophobia, pain, discharge, redness, itching and visual disturbance  Respiratory: Negative for cough, chest tightness and shortness of breath  Cardiovascular: Negative for chest pain, palpitations and leg swelling  Gastrointestinal: Negative for abdominal pain, constipation, nausea and vomiting  Endocrine: Negative for cold intolerance, heat intolerance, polydipsia, polyphagia and polyuria  IDDM   Genitourinary: Negative for decreased urine volume, difficulty urinating, dysuria, frequency, hematuria and urgency  Musculoskeletal: Negative for arthralgias (left second toe), back pain, gait problem, joint swelling, myalgias, neck pain and neck stiffness  Skin: Negative for color change and rash  Allergic/Immunologic: Negative for immunocompromised state  Neurological: Negative for dizziness, tremors, seizures, syncope, speech difficulty, weakness, light-headedness, numbness and headaches  Hematological: Does not bruise/bleed easily  Psychiatric/Behavioral: Negative for behavioral problems and suicidal ideas  Physical Exam  Physical Exam  Vitals signs and nursing note reviewed     Constitutional:       General: She is not in acute distress  Appearance: Normal appearance  She is well-developed  She is obese  She is not ill-appearing, toxic-appearing or diaphoretic  HENT:      Head: Normocephalic and atraumatic  Right Ear: Tympanic membrane, ear canal and external ear normal  There is no impacted cerumen  Left Ear: Tympanic membrane, ear canal and external ear normal  There is no impacted cerumen  Nose: Nose normal  No congestion or rhinorrhea  Mouth/Throat:      Mouth: Mucous membranes are moist       Pharynx: Oropharynx is clear  No oropharyngeal exudate or posterior oropharyngeal erythema  Eyes:      General: No scleral icterus  Right eye: No discharge  Left eye: No discharge  Extraocular Movements: Extraocular movements intact  Conjunctiva/sclera: Conjunctivae normal       Pupils: Pupils are equal, round, and reactive to light  Neck:      Musculoskeletal: Normal range of motion and neck supple  No neck rigidity or muscular tenderness  Thyroid: No thyromegaly  Vascular: No hepatojugular reflux or JVD  Trachea: No tracheal deviation  Cardiovascular:      Rate and Rhythm: Normal rate and regular rhythm  Pulses: Normal pulses  Carotid pulses are 2+ on the right side and 2+ on the left side  Radial pulses are 2+ on the right side and 2+ on the left side  Dorsalis pedis pulses are 2+ on the right side and 2+ on the left side  Posterior tibial pulses are 2+ on the right side and 2+ on the left side  Heart sounds: Normal heart sounds  No murmur  Pulmonary:      Effort: Pulmonary effort is normal  No accessory muscle usage or respiratory distress  Breath sounds: Normal breath sounds  No wheezing or rales  Chest:      Chest wall: No mass, deformity, tenderness, crepitus or edema  Abdominal:      General: Bowel sounds are normal  There is no distension or abdominal bruit  Palpations: Abdomen is soft   There is no hepatomegaly, splenomegaly or mass  Tenderness: There is no abdominal tenderness  There is no right CVA tenderness, left CVA tenderness, guarding or rebound  Hernia: No hernia is present  Musculoskeletal: Normal range of motion  General: No tenderness  Right lower leg: She exhibits no tenderness  No edema  Left lower leg: She exhibits no tenderness  No edema  Comments: Left 2nd toe edematous - no discharge - no change in color   Lymphadenopathy:      Cervical: No cervical adenopathy  Skin:     General: Skin is warm and dry  Capillary Refill: Capillary refill takes less than 2 seconds  Findings: No ecchymosis or rash  Neurological:      General: No focal deficit present  Mental Status: She is alert and oriented to person, place, and time  Cranial Nerves: No cranial nerve deficit  Sensory: No sensory deficit  Motor: No weakness or abnormal muscle tone  Deep Tendon Reflexes: Reflexes normal    Psychiatric:         Mood and Affect: Mood normal          Behavior: Behavior normal          Thought Content:  Thought content normal          Judgment: Judgment normal          Vital Signs  ED Triage Vitals [09/22/20 1747]   Temperature Pulse Respirations Blood Pressure SpO2   97 9 °F (36 6 °C) 95 18 155/81 100 %      Temp Source Heart Rate Source Patient Position - Orthostatic VS BP Location FiO2 (%)   Tympanic Monitor Lying Left arm --      Pain Score       Worst Possible Pain           Vitals:    09/22/20 1747   BP: 155/81   Pulse: 95   Patient Position - Orthostatic VS: Lying         Visual Acuity      ED Medications  Medications   sodium chloride 0 9 % bolus 1,000 mL (1,000 mL Intravenous New Bag 9/22/20 1905)   sodium chloride 0 9 % bolus 1,000 mL (0 mL Intravenous Stopped 9/22/20 1902)   ketorolac (TORADOL) injection 30 mg (30 mg Intravenous Given 9/22/20 1822)   vancomycin (VANCOCIN) IVPB (premix in dextrose) 1,000 mg 200 mL (1,000 mg Intravenous New Bag 9/22/20 1832)   HYDROcodone-acetaminophen (NORCO) 5-325 mg per tablet 1 tablet (1 tablet Oral Given 9/22/20 1831)   iohexol (OMNIPAQUE) 350 MG/ML injection (SINGLE-DOSE) 100 mL (100 mL Intravenous Given 9/22/20 1903)       Diagnostic Studies  Results Reviewed     Procedure Component Value Units Date/Time    POCT pregnancy, urine [[id number]]  (Normal) Resulted:  09/22/20 1808    Lab Status:  Final result Updated:  09/22/20 1835     EXT PREG TEST UR (Ref: Negative) negative     Control valid    Comprehensive metabolic panel [[id number]]  (Abnormal) Collected:  09/22/20 1800    Lab Status:  Final result Specimen:  Blood from Arm, Right Updated:  09/22/20 1826     Sodium 136 mmol/L      Potassium 4 5 mmol/L      Chloride 104 mmol/L      CO2 25 mmol/L      ANION GAP 7 mmol/L      BUN 26 mg/dL      Creatinine 0 72 mg/dL      Glucose 253 mg/dL      Calcium 8 3 mg/dL      AST 18 U/L      ALT 22 U/L      Alkaline Phosphatase 102 4 U/L      Total Protein 6 2 g/dL      Albumin 3 6 g/dL      Total Bilirubin 0 18 mg/dL      eGFR 127 ml/min/1 73sq m     Narrative:       [place] guidelines for Chronic Kidney Disease (CKD):     Stage 1 with normal or high GFR (GFR > 90 mL/min/1 73 square meters)    Stage 2 Mild CKD (GFR = 60-89 mL/min/1 73 square meters)    Stage 3A Moderate CKD (GFR = 45-59 mL/min/1 73 square meters)    Stage 3B Moderate CKD (GFR = 30-44 mL/min/1 73 square meters)    Stage 4 Severe CKD (GFR = 15-29 mL/min/1 73 square meters)    Stage 5 End Stage CKD (GFR <15 mL/min/1 73 square meters)  Note: GFR calculation is accurate only with a steady state creatinine    Lactic acid [[id number]]  (Normal) Collected:  09/22/20 1800    Lab Status:  Final result Specimen:  Blood from Arm, Right Updated:  09/22/20 1825     LACTIC ACID 1 3 mmol/L     Narrative:       Result may be elevated if tourniquet was used during collection      CBC and differential [[id number]]  (Abnormal) Collected:  09/22/20 1800    Lab Status:  Final result Specimen:  Blood from Arm, Right Updated:  09/22/20 1816     WBC 8 80 Thousand/uL      RBC 4 94 Million/uL      Hemoglobin 13 7 g/dL      Hematocrit 37 8 %      MCV 77 fL      MCH 27 7 pg      MCHC 36 2 g/dL      RDW 14 0 %      MPV 10 6 fL      Platelets 090 Thousands/uL      Neutrophils Relative 52 %      Immat GRANS % 0 %      Lymphocytes Relative 38 %      Monocytes Relative 5 %      Eosinophils Relative 4 %      Basophils Relative 1 %      Neutrophils Absolute 4 63 Thousands/µL      Immature Grans Absolute 0 03 Thousand/uL      Lymphocytes Absolute 3 33 Thousands/µL      Monocytes Absolute 0 43 Thousand/µL      Eosinophils Absolute 0 31 Thousand/µL      Basophils Absolute 0 07 Thousands/µL     Blood culture #1 [[id number]] Collected:  09/22/20 1800    Lab Status: In process Specimen:  Blood from Arm, Right Updated:  09/22/20 1816    Blood culture #2 [[id number]] Collected:  09/22/20 1756    Lab Status: In process Specimen:  Blood from Arm, Left Updated:  09/22/20 1805                 CT lower extremity w contrast left   Final Result by Kareem Solares MD (09/22 1921)      No CT evident osteomyelitis  MRI evaluation would be more sensitive  Workstation performed: [id number]                    Procedures  Procedures         ED Course  ED Course as of Sep 22 1957   Tue Sep 22, 2020   233 This 60-year-old female with a history of insulin-dependent diabetes presented to emergency department via EMS with cellulitis of the left 2nd toe  Patient states this started approximately 2 weeks ago  She was seen by her PCP and was started on Bactrim  She has since finished the Bactrim with no relief  She denies fever chills  Denies nausea vomiting diarrhea  She states that her blood sugar had been elevated at however during the treatment she was able to control and today it was in the 400s  Blood sugar here is 253  Lactate was normal 1 3 pregnancy test was negative blood cultures are drawn and are pending  CBC was unremarkable with a WBC count of 8 8, stable hematocrits and hemoglobin at 37 8 and 13 7  Platelet count is 553  CMP unremarkable except for the glucose and elevated BUN at 26  Patient hydrated with 2 L of normal saline  Creatinine normal at 0 72  His concern for osteomyelitis or an abscess  I did do a CT scan with contrast of the toe  The scan was interpreted as having no abscess or osteomyelitis  Patient was given vancomycin IV after blood cultures were drawn  I initially treated patient's pain with Toradol which she states gave her no relief  Then I gave her a Norco   She stated she had relief however then was saying that she was in pain again however she was able to ambulate  I then went in and discussed the findings with her and patient did not even mention the pain  I ordered doxycycline for the patient for an outpatient course also cover MRSA and other pathogens  Also cover mupirocin to use externally  Also ordered ibuprofen    I did make a referral to Podiatry for follow-up     Stable for discharge                                            MDM  Number of Diagnoses or Management Options  Cellulitis:   Toe infection:   Diagnosis management comments: Infection - MRSA, osteo, abscess       Amount and/or Complexity of Data Reviewed  Clinical lab tests: ordered and reviewed  Tests in the radiology section of CPT®: ordered and reviewed  Obtain history from someone other than the patient: yes (MOm)  Review and summarize past medical records: yes    Risk of Complications, Morbidity, and/or Mortality  Presenting problems: high  Diagnostic procedures: low  Management options: low    Patient Progress  Patient progress: stable      Disposition  Final diagnoses:   Cellulitis   Toe infection     Time reflects when diagnosis was documented in both MDM as applicable and the Disposition within this note     Time User Action Codes Description Comment    9/22/2020  7:47 PM Primitivo Brown Add [L03 90] Cellulitis     9/22/2020  7:47 PM Primitivo Brown Add [L08 9] Toe infection       ED Disposition     ED Disposition Condition Date/Time Comment    Discharge Stable Tue Sep 22, 2020  7:47 PM Abraham Tracy discharge to home/self care              Follow-up Information    None         Patient's Medications   Discharge Prescriptions    DOXYCYCLINE HYCLATE (VIBRAMYCIN) 100 MG CAPSULE    Take 1 capsule (100 mg total) by mouth 2 (two) times a day for 10 days       Start Date: 9/22/2020 End Date: 10/2/2020       Order Dose: 100 mg       Quantity: 20 capsule    Refills: 0    IBUPROFEN (MOTRIN) 600 MG TABLET    Take 1 tablet (600 mg total) by mouth every 6 (six) hours as needed for mild pain or moderate pain       Start Date: 9/22/2020 End Date: --       Order Dose: 600 mg       Quantity: 60 tablet    Refills: 0    MUPIROCIN (BACTROBAN) 2 % OINTMENT    Apply topically 3 (three) times a day       Start Date: 9/22/2020 End Date: --       Order Dose: --       Quantity: 15 g    Refills: 0         PDMP Review       Value Time User    PDMP Reviewed  Yes 9/22/2020  7:47 PM Aleah Marin MD          ED Provider  Electronically Signed by           Aleah Marin MD  09/22/20 6073

## 2020-09-25 RX ORDER — DULAGLUTIDE 1.5 MG/.5ML
INJECTION, SOLUTION SUBCUTANEOUS
Qty: 2 ML | Refills: 0 | Status: SHIPPED | OUTPATIENT
Start: 2020-09-25 | End: 2020-10-13 | Stop reason: SDUPTHER

## 2020-09-28 LAB
BACTERIA BLD CULT: NORMAL
BACTERIA BLD CULT: NORMAL

## 2020-10-13 ENCOUNTER — APPOINTMENT (EMERGENCY)
Dept: RADIOLOGY | Facility: HOSPITAL | Age: 35
End: 2020-10-13
Payer: MEDICARE

## 2020-10-13 ENCOUNTER — OFFICE VISIT (OUTPATIENT)
Dept: FAMILY MEDICINE CLINIC | Facility: CLINIC | Age: 35
End: 2020-10-13
Payer: MEDICARE

## 2020-10-13 ENCOUNTER — HOSPITAL ENCOUNTER (EMERGENCY)
Facility: HOSPITAL | Age: 35
Discharge: HOME | End: 2020-10-13
Attending: EMERGENCY MEDICINE | Admitting: EMERGENCY MEDICINE
Payer: MEDICARE

## 2020-10-13 VITALS
RESPIRATION RATE: 18 BRPM | TEMPERATURE: 97.8 F | OXYGEN SATURATION: 100 % | DIASTOLIC BLOOD PRESSURE: 87 MMHG | HEART RATE: 90 BPM | SYSTOLIC BLOOD PRESSURE: 142 MMHG

## 2020-10-13 VITALS
HEIGHT: 61 IN | BODY MASS INDEX: 42.03 KG/M2 | WEIGHT: 222.6 LBS | RESPIRATION RATE: 20 BRPM | HEART RATE: 82 BPM | OXYGEN SATURATION: 98 % | SYSTOLIC BLOOD PRESSURE: 140 MMHG | DIASTOLIC BLOOD PRESSURE: 82 MMHG

## 2020-10-13 DIAGNOSIS — M79.675 TOE PAIN, LEFT: Primary | ICD-10-CM

## 2020-10-13 DIAGNOSIS — E11.65 UNCONTROLLED TYPE 2 DIABETES MELLITUS WITH HYPERGLYCEMIA (HCC): ICD-10-CM

## 2020-10-13 DIAGNOSIS — Z72.89 DELIBERATE SELF-CUTTING: ICD-10-CM

## 2020-10-13 DIAGNOSIS — E11.40 DIABETIC NEUROPATHY (HCC): ICD-10-CM

## 2020-10-13 DIAGNOSIS — E11.9 DIABETES (HCC): ICD-10-CM

## 2020-10-13 DIAGNOSIS — Z76.0 MEDICATION REFILL: ICD-10-CM

## 2020-10-13 DIAGNOSIS — L03.90 CELLULITIS: ICD-10-CM

## 2020-10-13 LAB — GLUCOSE SERPL-MCNC: 290 MG/DL (ref 65–140)

## 2020-10-13 PROCEDURE — 96372 THER/PROPH/DIAG INJ SC/IM: CPT

## 2020-10-13 PROCEDURE — 82948 REAGENT STRIP/BLOOD GLUCOSE: CPT

## 2020-10-13 PROCEDURE — 73660 X-RAY EXAM OF TOE(S): CPT

## 2020-10-13 PROCEDURE — 99284 EMERGENCY DEPT VISIT MOD MDM: CPT

## 2020-10-13 PROCEDURE — 99213 OFFICE O/P EST LOW 20 MIN: CPT | Performed by: FAMILY MEDICINE

## 2020-10-13 PROCEDURE — 99284 EMERGENCY DEPT VISIT MOD MDM: CPT | Performed by: EMERGENCY MEDICINE

## 2020-10-13 RX ORDER — DULAGLUTIDE 1.5 MG/.5ML
0.5 INJECTION, SOLUTION SUBCUTANEOUS WEEKLY
Qty: 2 ML | Refills: 0 | Status: SHIPPED | OUTPATIENT
Start: 2020-10-13 | End: 2021-03-18

## 2020-10-13 RX ORDER — IBUPROFEN 600 MG/1
600 TABLET ORAL EVERY 6 HOURS PRN
Qty: 60 TABLET | Refills: 0 | Status: SHIPPED | OUTPATIENT
Start: 2020-10-13 | End: 2020-11-18

## 2020-10-13 RX ORDER — CEPHALEXIN 500 MG/1
500 CAPSULE ORAL EVERY 8 HOURS SCHEDULED
Qty: 21 CAPSULE | Refills: 0 | Status: SHIPPED | OUTPATIENT
Start: 2020-10-13 | End: 2020-10-20

## 2020-10-13 RX ORDER — GABAPENTIN 300 MG/1
300 CAPSULE ORAL 3 TIMES DAILY
Qty: 90 CAPSULE | Refills: 0 | Status: SHIPPED | OUTPATIENT
Start: 2020-10-13 | End: 2021-02-16

## 2020-10-13 RX ORDER — INSULIN DETEMIR 100 [IU]/ML
INJECTION, SOLUTION SUBCUTANEOUS
Refills: 0 | OUTPATIENT
Start: 2020-10-13

## 2020-10-13 RX ORDER — OXYCODONE HYDROCHLORIDE AND ACETAMINOPHEN 5; 325 MG/1; MG/1
1 TABLET ORAL ONCE
Status: COMPLETED | OUTPATIENT
Start: 2020-10-13 | End: 2020-10-13

## 2020-10-13 RX ORDER — INSULIN GLARGINE 100 [IU]/ML
30 INJECTION, SOLUTION SUBCUTANEOUS DAILY
Qty: 20 PEN | Refills: 0 | Status: SHIPPED | OUTPATIENT
Start: 2020-10-13 | End: 2021-03-09

## 2020-10-13 RX ORDER — LIDOCAINE 40 MG/G
CREAM TOPICAL ONCE
Status: COMPLETED | OUTPATIENT
Start: 2020-10-13 | End: 2020-10-13

## 2020-10-13 RX ORDER — INSULIN GLARGINE 100 [IU]/ML
39 INJECTION, SOLUTION SUBCUTANEOUS ONCE
Status: COMPLETED | OUTPATIENT
Start: 2020-10-13 | End: 2020-10-13

## 2020-10-13 RX ORDER — ACETAMINOPHEN 500 MG
500 TABLET ORAL EVERY 6 HOURS PRN
Qty: 56 TABLET | Refills: 0 | Status: SHIPPED | OUTPATIENT
Start: 2020-10-13 | End: 2020-11-18

## 2020-10-13 RX ADMIN — INSULIN GLARGINE 39 UNITS: 100 INJECTION, SOLUTION SUBCUTANEOUS at 07:53

## 2020-10-13 RX ADMIN — LIDOCAINE 1 APPLICATION: 4 CREAM TOPICAL at 08:05

## 2020-10-13 RX ADMIN — OXYCODONE HYDROCHLORIDE AND ACETAMINOPHEN 1 TABLET: 5; 325 TABLET ORAL at 07:17

## 2020-11-18 DIAGNOSIS — E11.65 UNCONTROLLED TYPE 2 DIABETES MELLITUS WITH HYPERGLYCEMIA (HCC): ICD-10-CM

## 2020-11-18 DIAGNOSIS — E11.40 DIABETIC NEUROPATHY (HCC): ICD-10-CM

## 2020-11-18 DIAGNOSIS — M79.675 TOE PAIN, LEFT: ICD-10-CM

## 2020-11-18 DIAGNOSIS — E11.65 UNCONTROLLED TYPE 2 DIABETES MELLITUS WITH HYPERGLYCEMIA (HCC): Primary | ICD-10-CM

## 2020-11-18 DIAGNOSIS — F31.9 BIPOLAR 1 DISORDER (HCC): ICD-10-CM

## 2020-11-18 RX ORDER — PSEUDOEPHED/ACETAMINOPH/DIPHEN 30MG-500MG
TABLET ORAL
Qty: 56 TABLET | Refills: 0 | Status: SHIPPED | OUTPATIENT
Start: 2020-11-18 | End: 2021-08-22

## 2020-11-18 RX ORDER — DIPHENHYDRAMINE HCL 25 MG
TABLET ORAL
Qty: 1 KIT | Refills: 0 | Status: SHIPPED | OUTPATIENT
Start: 2020-11-18

## 2020-11-18 RX ORDER — IBUPROFEN 600 MG/1
TABLET ORAL
Qty: 60 TABLET | Refills: 0 | Status: SHIPPED | OUTPATIENT
Start: 2020-11-18 | End: 2021-03-17 | Stop reason: SDUPTHER

## 2020-11-19 ENCOUNTER — TELEPHONE (OUTPATIENT)
Dept: FAMILY MEDICINE CLINIC | Facility: CLINIC | Age: 35
End: 2020-11-19

## 2020-11-19 ENCOUNTER — TELEPHONE (OUTPATIENT)
Dept: SURGERY | Facility: CLINIC | Age: 35
End: 2020-11-19

## 2020-11-19 RX ORDER — GLUCAGON HYDROCHLORIDE 1 MG
KIT INJECTION
Qty: 1 EACH | Refills: 0 | Status: SHIPPED | OUTPATIENT
Start: 2020-11-19 | End: 2021-08-27 | Stop reason: HOSPADM

## 2020-11-19 RX ORDER — TRAZODONE HYDROCHLORIDE 100 MG/1
TABLET ORAL
Qty: 60 TABLET | Refills: 0 | Status: SHIPPED | OUTPATIENT
Start: 2020-11-19 | End: 2021-09-30 | Stop reason: ALTCHOICE

## 2020-11-19 RX ORDER — PEN NEEDLE, DIABETIC 32 GX 1/4"
NEEDLE, DISPOSABLE MISCELLANEOUS
Qty: 100 EACH | Refills: 0 | Status: SHIPPED | OUTPATIENT
Start: 2020-11-19

## 2020-11-19 RX ORDER — QUETIAPINE FUMARATE 300 MG/1
TABLET, FILM COATED ORAL
Qty: 30 TABLET | Refills: 0 | Status: SHIPPED | OUTPATIENT
Start: 2020-11-19 | End: 2021-09-30 | Stop reason: ALTCHOICE

## 2020-11-24 ENCOUNTER — OFFICE VISIT (OUTPATIENT)
Dept: FAMILY MEDICINE CLINIC | Facility: CLINIC | Age: 35
End: 2020-11-24
Payer: MEDICARE

## 2020-11-24 ENCOUNTER — TELEPHONE (OUTPATIENT)
Dept: FAMILY MEDICINE CLINIC | Facility: CLINIC | Age: 35
End: 2020-11-24

## 2020-11-24 VITALS
OXYGEN SATURATION: 98 % | BODY MASS INDEX: 42.1 KG/M2 | HEART RATE: 87 BPM | TEMPERATURE: 97.3 F | HEIGHT: 61 IN | DIASTOLIC BLOOD PRESSURE: 94 MMHG | SYSTOLIC BLOOD PRESSURE: 160 MMHG | WEIGHT: 223 LBS

## 2020-11-24 DIAGNOSIS — I10 HYPERTENSION, UNSPECIFIED TYPE: ICD-10-CM

## 2020-11-24 DIAGNOSIS — E11.65 UNCONTROLLED TYPE 2 DIABETES MELLITUS WITH HYPERGLYCEMIA (HCC): Primary | ICD-10-CM

## 2020-11-24 DIAGNOSIS — J44.9 COPD (CHRONIC OBSTRUCTIVE PULMONARY DISEASE) (HCC): ICD-10-CM

## 2020-11-24 DIAGNOSIS — F17.219 CIGARETTE NICOTINE DEPENDENCE WITH NICOTINE-INDUCED DISORDER: ICD-10-CM

## 2020-11-24 LAB — SL AMB POCT HEMOGLOBIN AIC: 8.9 (ref ?–6.5)

## 2020-11-24 PROCEDURE — 36416 COLLJ CAPILLARY BLOOD SPEC: CPT | Performed by: FAMILY MEDICINE

## 2020-11-24 PROCEDURE — 99214 OFFICE O/P EST MOD 30 MIN: CPT | Performed by: FAMILY MEDICINE

## 2020-11-24 PROCEDURE — 83036 HEMOGLOBIN GLYCOSYLATED A1C: CPT | Performed by: FAMILY MEDICINE

## 2020-11-24 RX ORDER — GABAPENTIN 100 MG/1
100 CAPSULE ORAL
COMMUNITY
Start: 2020-11-18 | End: 2021-02-16 | Stop reason: SDUPTHER

## 2020-11-24 RX ORDER — HYDROXYZINE 50 MG/1
50 TABLET, FILM COATED ORAL 3 TIMES DAILY
COMMUNITY
Start: 2020-11-18

## 2020-11-24 RX ORDER — PRAZOSIN HYDROCHLORIDE 1 MG/1
1 CAPSULE ORAL
COMMUNITY
Start: 2020-11-18

## 2020-11-24 RX ORDER — IBUPROFEN 600 MG/1
TABLET ORAL
COMMUNITY
Start: 2020-11-19 | End: 2021-08-27 | Stop reason: HOSPADM

## 2020-11-24 RX ORDER — CHLORPROMAZINE HYDROCHLORIDE 100 MG/1
100 TABLET, FILM COATED ORAL
COMMUNITY
Start: 2020-11-18

## 2020-11-24 RX ORDER — LIDOCAINE HCL 4 %
CREAM (GRAM) TOPICAL
Qty: 100 EACH | Refills: 3 | Status: SHIPPED | OUTPATIENT
Start: 2020-11-24 | End: 2021-07-26 | Stop reason: SDUPTHER

## 2020-11-24 RX ORDER — BLOOD-GLUCOSE METER
1 KIT MISCELLANEOUS DAILY
Qty: 1 EACH | Refills: 0 | Status: SHIPPED | OUTPATIENT
Start: 2020-11-24 | End: 2021-10-31 | Stop reason: SDUPTHER

## 2020-11-24 RX ORDER — LANCETS 28 GAUGE
EACH MISCELLANEOUS
Qty: 100 EACH | Refills: 3 | Status: SHIPPED | OUTPATIENT
Start: 2020-11-24 | End: 2021-07-26 | Stop reason: ALTCHOICE

## 2020-11-30 ENCOUNTER — TELEPHONE (OUTPATIENT)
Dept: FAMILY MEDICINE CLINIC | Facility: CLINIC | Age: 35
End: 2020-11-30

## 2020-12-01 DIAGNOSIS — J44.9 CHRONIC OBSTRUCTIVE PULMONARY DISEASE, UNSPECIFIED COPD TYPE (HCC): Primary | ICD-10-CM

## 2020-12-01 RX ORDER — FLUTICASONE PROPIONATE AND SALMETEROL XINAFOATE 115; 21 UG/1; UG/1
2 AEROSOL, METERED RESPIRATORY (INHALATION) 2 TIMES DAILY
Qty: 1 INHALER | Refills: 2 | Status: SHIPPED | OUTPATIENT
Start: 2020-12-01 | End: 2021-01-04 | Stop reason: SDUPTHER

## 2020-12-17 DIAGNOSIS — F31.9 BIPOLAR 1 DISORDER (HCC): ICD-10-CM

## 2020-12-17 DIAGNOSIS — E11.40 DIABETIC NEUROPATHY (HCC): ICD-10-CM

## 2021-01-04 ENCOUNTER — TELEMEDICINE (OUTPATIENT)
Dept: FAMILY MEDICINE CLINIC | Facility: CLINIC | Age: 36
End: 2021-01-04
Payer: MEDICARE

## 2021-01-04 DIAGNOSIS — J44.9 CHRONIC OBSTRUCTIVE PULMONARY DISEASE, UNSPECIFIED COPD TYPE (HCC): ICD-10-CM

## 2021-01-04 DIAGNOSIS — J44.9 COPD (CHRONIC OBSTRUCTIVE PULMONARY DISEASE) (HCC): ICD-10-CM

## 2021-01-04 DIAGNOSIS — J44.1 CHRONIC OBSTRUCTIVE PULMONARY DISEASE WITH ACUTE EXACERBATION (HCC): Primary | ICD-10-CM

## 2021-01-04 PROCEDURE — 99214 OFFICE O/P EST MOD 30 MIN: CPT | Performed by: FAMILY MEDICINE

## 2021-01-04 RX ORDER — ALBUTEROL SULFATE 90 UG/1
2 AEROSOL, METERED RESPIRATORY (INHALATION) EVERY 6 HOURS PRN
Qty: 1 INHALER | Refills: 0 | Status: SHIPPED | OUTPATIENT
Start: 2021-01-04 | End: 2021-02-08

## 2021-01-04 RX ORDER — FLUTICASONE PROPIONATE AND SALMETEROL XINAFOATE 115; 21 UG/1; UG/1
2 AEROSOL, METERED RESPIRATORY (INHALATION) 2 TIMES DAILY
Qty: 1 INHALER | Refills: 2 | Status: SHIPPED | OUTPATIENT
Start: 2021-01-04 | End: 2021-04-14

## 2021-01-04 RX ORDER — PREDNISONE 20 MG/1
40 TABLET ORAL DAILY
Qty: 10 TABLET | Refills: 0 | Status: SHIPPED | OUTPATIENT
Start: 2021-01-04 | End: 2021-01-09

## 2021-01-04 RX ORDER — AZITHROMYCIN 250 MG/1
TABLET, FILM COATED ORAL
Qty: 6 TABLET | Refills: 0 | Status: SHIPPED | OUTPATIENT
Start: 2021-01-04 | End: 2021-01-08

## 2021-01-04 NOTE — PROGRESS NOTES
COVID-19 Virtual Visit     Assessment/Plan:    Problem List Items Addressed This Visit     None         Disposition:     I referred patient to one of our centralized sites for a COVID-19 swab  Patient is symptomatic for respiratory infection, likely w/ multifactorial respiratory sx, but with positive COVID cases within her building  Patient has had these symptoms for at least 5 days and was referred to the [address] [place] [address] swab sites for testing  Given her chronic respiratory conditions, she was also given prednisone, azithromycin, and had her inhalers refilled  Patient understands that if she develops difficulty breathing or more acute/severe sx, she needs to report to the ED  She is aware of red flag sx  I have spent 12 minutes directly with the patient  Greater than 50% of this time was spent in counseling/coordination of care regarding: instructions for management and impressions  Encounter provider Sharma Goldberg, MD    Provider located at [address] [place] [address]  [phone]    Recent Visits  No visits were found meeting these conditions  Showing recent visits within past 7 days and meeting all other requirements     Today's Visits  Date Type Provider Dept   01/04/21 Telemedicine Yaz Colon 1452 today's visits and meeting all other requirements     Future Appointments  No visits were found meeting these conditions  Showing future appointments within next 150 days and meeting all other requirements      This virtual check-in was done via [employer] and patient was informed that this is a secure, HIPAA-compliant platform  She agrees to proceed  Patient agrees to participate in a virtual check in via telephone or video visit instead of presenting to the office to address urgent/immediate medical needs  Patient is aware this is a billable service      After connecting through [facility], the patient was identified by name and date of birth  Courtney Cruz was informed that this was a telemedicine visit and that the exam was being conducted confidentially over secure lines  My office door was closed  Courtney Cruz acknowledged consent and understanding of privacy and security of the telemedicine visit  I informed the patient that I have reviewed her record in Epic and presented the opportunity for her to ask any questions regarding the visit today  The patient agreed to participate  Subjective:   Courtney Cruz is a 30 y o  female who is concerned about COVID-19  Date of symptom onset: 12/30/2020    Patient's symptoms include chills, fatigue, rhinorrhea, shortness of breath, chest tightness, myalgias and headache  Patient denies fever, malaise, sore throat, anosmia, loss of taste, cough, abdominal pain, nausea, vomiting and diarrhea  Exposure:   Contact with a person who is under investigation (PUI) for or who is positive for COVID-19 within the last 14 days?: No    Hospitalized recently for fever and/or lower respiratory symptoms?: No      Currently a healthcare worker that is involved in direct patient care?: No      Works in a special setting where the risk of COVID-19 transmission may be high? (this may include long-term care, correctional and assisted facilities; homeless shelters; assisted-living facilities and group homes ): No      Resident in a special setting where the risk of COVID-19 transmission may be high? (this may include long-term care, correctional and assisted facilities; homeless shelters; assisted-living facilities and group homes ): No      Patient is presenting for about a week of difficulty breathing, with chest tightness, and positive COVID cases in her building  Patient reports that she also has not had her regular asthma/COPD inhalers all week, and continues to smokes   She reports wheezing, particularly at night, and feelings of chills and muscle aches but no fevers or GI sx      No results found for: Gracie Ayala, DIRK, [address]  Past Medical History:   Diagnosis Date    Asthma     Bipolar 1 disorder ([facility] )     COPD (chronic obstructive pulmonary disease) ([facility] )     Depression     Diabetes mellitus ([facility] )     Hypertension     Psychiatric disorder     PTSD (post-traumatic stress disorder)     Tendonitis      Past Surgical History:   Procedure Laterality Date     SECTION      EAR SURGERY       Current Outpatient Medications   Medication Sig Dispense Refill    Acetaminophen Extra Strength 500 MG tablet take 1 tablet by mouth every 6 hours if needed for mild pain or moderate pain for up to 14 days (Patient not taking: Reported on 2020) 56 tablet 0    albuterol (PROVENTIL HFA,VENTOLIN HFA) 90 mcg/act inhaler Inhale 2 puffs every 6 (six) hours as needed for wheezing or shortness of breath 1 Inhaler 0    ASPIRIN 81 PO Take 81 mg by mouth daily      atorvastatin (LIPITOR) 40 mg tablet       BD Pen Needle Micro U/F 32G X 6 MM MISC use 1 NEEDLE to inject MEDICATION subcutaneously twice a day 100 each 0    Blood Glucose Monitoring Suppl (True Metrix Air Glucose Meter) w/Device KIT use as directed 1 kit 0    chlorproMAZINE (THORAZINE) 100 mg tablet Take 100 mg by mouth daily at bedtime      Dulaglutide (Trulicity) 1 5 SS/5 0NP SOPN Inject 0 5 mL (1 5 mg total) under the skin once a week 2 mL 0    fluconazole (DIFLUCAN) 150 mg tablet       fluticasone-salmeterol (Advair HFA) 115-21 MCG/ACT inhaler Inhale 2 puffs 2 (two) times a day Rinse mouth after use  1 Inhaler 2    fluticasone-salmeterol (ADVAIR, WIXELA) 100-50 mcg/dose inhaler Inhale 1 puff 2 (two) times a day Rinse mouth after use   60 each 0    gabapentin (NEURONTIN) 100 mg capsule Take 100 mg by mouth daily in the early morning      gabapentin (NEURONTIN) 300 mg capsule Take 1 capsule (300 mg total) by mouth 3 (three) times a day 90 capsule 0    GlucaGen HypoKit 1 MG injection INJECT 1MG UNDER THE SKIN ONCE AS NEEDED FOR LOW BLOOD SUGAR FOR UP TO 1 DOSE  INJECT 1MG UNDER THE SKIN AS NEEDED FOR BLOOD SUGAR LESS THAN 70 FOR UP TO 1 DOSW IF UNABLE TO SWALLOW 1 each 0    Glucagon Emergency 1 MG injection       glucose blood ([employer] Glucose Meter Test Strips) test strip Use as instructed to check blood sugar every day 100 each 3    glucose blood test strip Use one strip 3 times daily for blood glucose testing  100 each 0    glucose monitoring kit (FREESTYLE) monitoring kit Use 1 each daily Use one each daily to check blood sugars  Please refill with freestyle ed or with whichever brand is covered by insurance 1 each 0    hydrOXYzine HCL (ATARAX) 50 mg tablet Take 50 mg by mouth 3 (three) times a day      ibuprofen (MOTRIN) 600 mg tablet take 1 tablet by mouth every 6 hours if needed for mild pain or moderate pain 60 tablet 0    insulin glargine (Lantus SoloStar) 100 units/mL injection pen Inject 30 Units under the skin daily 20 pen 0    insulin lispro (HumaLOG) 100 units/mL injection Inject under the skin 2 (two) times a day      lamoTRIgine (LaMICtal) 25 mg tablet Take 1 tablet (25 mg total) by mouth daily 30 tablet 0    Lancets (accu-chek soft touch) lancets Use as instructed 100 each 0    Lancets (freestyle) lancets Use as instructed, check blood sugar daily 100 each 3    lisinopril (ZESTRIL) 10 mg tablet TAKE 2 TABLETS BY MOUTH DAILY 180 tablet 0    metFORMIN (GLUCOPHAGE) 1000 MG tablet Take 1 tablet (1,000 mg total) by mouth 2 (two) times a day with meals 60 tablet 0    prazosin (MINIPRESS) 1 mg capsule Take 1 mg by mouth daily at bedtime      QUEtiapine (SEROquel) 300 mg tablet take 1 tablet by mouth once daily 30 tablet 0    traZODone (DESYREL) 100 mg tablet TAKE 2 TABLETS BY MOUTH DAILY AT BEDTIME 60 tablet 0     No current facility-administered medications for this visit  No Known Allergies    Review of Systems   Constitutional: Positive for chills and fatigue  Negative for fever  HENT: Positive for rhinorrhea  Negative for sore throat  Respiratory: Positive for chest tightness, shortness of breath and wheezing  Negative for cough  Cardiovascular: Positive for chest pain  Gastrointestinal: Negative for abdominal pain, diarrhea, nausea and vomiting  Musculoskeletal: Positive for myalgias  Negative for arthralgias and back pain  Neurological: Positive for headaches  Negative for dizziness  Objective: There were no vitals filed for this visit  Physical Exam  Constitutional:       General: She is not in acute distress  Appearance: Normal appearance  She is not ill-appearing  HENT:      Head: Normocephalic  Pulmonary:      Effort: No respiratory distress  Comments: No conversational dyspnea or cough during appointment  Neurological:      Mental Status: She is alert  VIRTUAL VISIT DISCLAIMER    Harpreet Lugo acknowledges that she has consented to an online visit or consultation  She understands that the online visit is based solely on information provided by her, and that, in the absence of a face-to-face physical evaluation by the physician, the diagnosis she receives is both limited and provisional in terms of accuracy and completeness  This is not intended to replace a full medical face-to-face evaluation by the physician  Harpreet Lugo understands and accepts these terms

## 2021-01-08 DIAGNOSIS — J44.1 CHRONIC OBSTRUCTIVE PULMONARY DISEASE WITH ACUTE EXACERBATION (HCC): ICD-10-CM

## 2021-01-08 PROCEDURE — 87637 SARSCOV2&INF A&B&RSV AMP PRB: CPT | Performed by: PSYCHIATRY & NEUROLOGY

## 2021-01-09 LAB
FLUAV RNA NPH QL NAA+PROBE: NOT DETECTED
FLUBV RNA NPH QL NAA+PROBE: NOT DETECTED
RSV RNA NPH QL NAA+PROBE: NOT DETECTED
SARS-COV-2 RNA NPH QL NAA+PROBE: NOT DETECTED

## 2021-01-12 ENCOUNTER — TELEPHONE (OUTPATIENT)
Dept: FAMILY MEDICINE CLINIC | Facility: CLINIC | Age: 36
End: 2021-01-12

## 2021-01-20 DIAGNOSIS — F31.9 BIPOLAR 1 DISORDER (HCC): ICD-10-CM

## 2021-01-20 DIAGNOSIS — E11.40 DIABETIC NEUROPATHY (HCC): ICD-10-CM

## 2021-01-20 RX ORDER — TRAZODONE HYDROCHLORIDE 100 MG/1
200 TABLET ORAL
Qty: 60 TABLET | Refills: 0 | Status: CANCELLED | OUTPATIENT
Start: 2021-01-20

## 2021-01-20 RX ORDER — QUETIAPINE FUMARATE 300 MG/1
300 TABLET, FILM COATED ORAL DAILY
Qty: 30 TABLET | Refills: 0 | Status: CANCELLED | OUTPATIENT
Start: 2021-01-20

## 2021-01-20 NOTE — TELEPHONE ENCOUNTER
Can you please confirm that Ms Zora Erwin has a psychiatrist, Dr Joe Garcia at [facility] in [facility]? If so, these medications should be managed by him to avoid overlap

## 2021-01-20 NOTE — TELEPHONE ENCOUNTER
[facility] and spoke with  to confirm that patient is seen by Dr Martin  As per  patient last seen on 1/12/2021  Left message for someone from the nursing staff to return call to [phone]

## 2021-01-20 NOTE — TELEPHONE ENCOUNTER
Pharmacy called requesting refills on attached medication desyrel and seroquel  Please advise  Thank you

## 2021-02-02 RX ORDER — TRAZODONE HYDROCHLORIDE 100 MG/1
TABLET ORAL
Qty: 60 TABLET | Refills: 0 | OUTPATIENT
Start: 2021-02-02

## 2021-02-02 RX ORDER — QUETIAPINE FUMARATE 300 MG/1
TABLET, FILM COATED ORAL
Qty: 30 TABLET | Refills: 0 | OUTPATIENT
Start: 2021-02-02

## 2021-02-02 NOTE — TELEPHONE ENCOUNTER
Patient sees Dr Ashanti Cornell for psychiatry needs  They should refill this medication- please reach back to me if this does not happen

## 2021-02-07 DIAGNOSIS — J44.9 COPD (CHRONIC OBSTRUCTIVE PULMONARY DISEASE) (HCC): ICD-10-CM

## 2021-02-08 RX ORDER — ALBUTEROL SULFATE 90 UG/1
AEROSOL, METERED RESPIRATORY (INHALATION)
Qty: 8.5 G | Refills: 0 | Status: SHIPPED | OUTPATIENT
Start: 2021-02-08 | End: 2021-03-22 | Stop reason: SDUPTHER

## 2021-02-16 ENCOUNTER — OFFICE VISIT (OUTPATIENT)
Dept: FAMILY MEDICINE CLINIC | Facility: CLINIC | Age: 36
End: 2021-02-16
Payer: MEDICARE

## 2021-02-16 DIAGNOSIS — E11.42 TYPE 2 DIABETES MELLITUS WITH DIABETIC POLYNEUROPATHY, WITH LONG-TERM CURRENT USE OF INSULIN (HCC): ICD-10-CM

## 2021-02-16 DIAGNOSIS — E11.9 DIABETES (HCC): ICD-10-CM

## 2021-02-16 DIAGNOSIS — Z79.4 TYPE 2 DIABETES MELLITUS WITH DIABETIC POLYNEUROPATHY, WITH LONG-TERM CURRENT USE OF INSULIN (HCC): ICD-10-CM

## 2021-02-16 DIAGNOSIS — I10 HYPERTENSION, UNSPECIFIED TYPE: ICD-10-CM

## 2021-02-16 DIAGNOSIS — M79.675 TOE PAIN, LEFT: Primary | ICD-10-CM

## 2021-02-16 DIAGNOSIS — E11.65 UNCONTROLLED TYPE 2 DIABETES MELLITUS WITH HYPERGLYCEMIA (HCC): ICD-10-CM

## 2021-02-16 PROCEDURE — 99213 OFFICE O/P EST LOW 20 MIN: CPT | Performed by: FAMILY MEDICINE

## 2021-02-16 RX ORDER — ADHESIVE BANDAGE 3/4"
BANDAGE TOPICAL DAILY
Qty: 1 EACH | Refills: 0 | Status: SHIPPED | OUTPATIENT
Start: 2021-02-16 | End: 2021-03-05

## 2021-02-16 RX ORDER — GABAPENTIN 300 MG/1
600 CAPSULE ORAL 3 TIMES DAILY
Qty: 180 CAPSULE | Refills: 0 | Status: SHIPPED | OUTPATIENT
Start: 2021-02-16 | End: 2021-08-22

## 2021-02-16 NOTE — PROGRESS NOTES
Assessment/Plan:    Toe pain, left  Likely due to poorly controlled diabetic neuropathy due to poorly controlled diabetes  Patient also has a callus formation with centralized nucleus which can also contribute to hyperalgesia  Difficult to assess whether there is a wound as wounds can develop underneath callus  Explained this to patient and urged her to try to get seen by podiatry sooner  Discussed expectation, as pain may not completely resolve with medication and will improve with enhanced control of diabetes and with callus removal  · Follow up with podiatry  · Increase gabapentin to 600mg TID  · Encouraged adherence to diabetic regimen  · Discussed lifestyle changes - including healthy diet and exercise  · Discussed ED precautions, including if pain worsens or develops systemic symptoms and signs of infection including fevers, chills, worsening pain in feet with wound  · Follow up in 1-3 weeks      Type 2 diabetes mellitus with diabetic polyneuropathy, with long-term current use of insulin ([facility])    Lab Results   Component Value Date    HGBA1C 8 9 (A) 70/60/0743     · Complicated by diabetic neuropathy, on gabapentin 300mg TID and 100mg in the monring  · Diabetes poorly controlled, however, improved compared to A1C in August 2020  Likely due to poor compliance  · recommendd she log sugars- although was recommended to do this in the past and she had difficulty doing this  · Recommend regular follow up with podiatry and opthalmology  · Gave ED precautions for low/high blood sugars       Diagnoses and all orders for this visit:    Toe pain, left    Uncontrolled type 2 diabetes mellitus with hyperglycemia ([facility])  -     Lancets (accu-chek soft touch) lancets; Use as instructed    Diabetes ([facility])  -     gabapentin (NEURONTIN) 300 mg capsule;  Take 2 capsules (600 mg total) by mouth 3 (three) times a day    Hypertension, unspecified type  -     Blood Pressure Monitoring (Blood Pressure Cuff) MISC; Use daily    Type 2 diabetes mellitus with diabetic polyneuropathy, with long-term current use of insulin ([facility])          Subjective:      Patient ID: Deward Hodgkins is a 30 y o  female with uncontrolled type 2 diabetes  Patient reports she has had severe, sharp burning pain in her L foot for the past few weeks which has been gradually worsening  She is at the point where it is intolerable and it hurts to walk  Pain improves with rest  Denies any foot wounds  She reports she has an appointment with podiatry within the next few weeks  HPI    The following portions of the patient's history were reviewed and updated as appropriate: allergies, current medications, past family history, past medical history, past social history, past surgical history and problem list     Review of Systems   Constitutional: Negative for chills and fever  Gastrointestinal: Negative for diarrhea, nausea and vomiting  Endocrine: Negative for polydipsia and polyuria  Musculoskeletal: Positive for gait problem  Negative for joint swelling and myalgias  Foot pain     Skin: Negative for rash and wound  Neurological: Positive for numbness  Negative for weakness  Objective: There were no vitals taken for this visit  Physical Exam  Constitutional:       General: She is in acute distress  Appearance: She is not ill-appearing, toxic-appearing or diaphoretic  HENT:      Head: Normocephalic and atraumatic  Cardiovascular:      Pulses: Normal pulses  Pulmonary:      Effort: Pulmonary effort is normal  No respiratory distress  Breath sounds: No wheezing  Musculoskeletal:      Right lower leg: No edema  Left lower leg: No edema  Skin:     General: Skin is warm  Coloration: Skin is not pale  Findings: No erythema or rash  Comments: Callus formation with a central nucleus at the lateral aspect at the base of the 5th metatarsal  No bleeding or discharge  Hyperalgesia to light touch   Tenderness to palpation at the base of the first metatarsal on the dorsal aspect of the foot  Soft skin on feet  Neurological:      Mental Status: She is alert  Comments: Loss of pinprick sensation in the entire L foot      Psychiatric:         Mood and Affect: Mood normal          Behavior: Behavior normal            Signature: Nury Jones DO, [address], PGY-2  02/17/21

## 2021-02-17 PROBLEM — Z79.4 TYPE 2 DIABETES MELLITUS WITH DIABETIC POLYNEUROPATHY, WITH LONG-TERM CURRENT USE OF INSULIN (HCC): Status: ACTIVE | Noted: 2020-08-07

## 2021-02-17 PROBLEM — E11.42 TYPE 2 DIABETES MELLITUS WITH DIABETIC POLYNEUROPATHY, WITH LONG-TERM CURRENT USE OF INSULIN (HCC): Status: ACTIVE | Noted: 2020-08-07

## 2021-02-18 NOTE — ASSESSMENT & PLAN NOTE
Lab Results   Component Value Date    HGBA1C 8 9 (A) 72/97/5685     · Complicated by diabetic neuropathy, on gabapentin 300mg TID and 100mg in the monring  · Diabetes poorly controlled, however, improved compared to A1C in August 2020   Likely due to poor compliance  · recommendd she log sugars- although was recommended to do this in the past and she had difficulty doing this  · Recommend regular follow up with podiatry and opthalmology  · Gave ED precautions for low/high blood sugars

## 2021-02-18 NOTE — ASSESSMENT & PLAN NOTE
Likely due to poorly controlled diabetic neuropathy due to poorly controlled diabetes  Patient also has a callus formation with centralized nucleus which can also contribute to hyperalgesia  Difficult to assess whether there is a wound as wounds can develop underneath callus  Explained this to patient and urged her to try to get seen by podiatry sooner   Discussed expectation, as pain may not completely resolve with medication and will improve with enhanced control of diabetes and with callus removal  · Follow up with podiatry  · Increase gabapentin to 600mg TID  · Encouraged adherence to diabetic regimen  · Discussed lifestyle changes - including healthy diet and exercise  · Discussed ED precautions, including if pain worsens or develops systemic symptoms and signs of infection including fevers, chills, worsening pain in feet with wound  · Follow up in 1-3 weeks

## 2021-03-05 DIAGNOSIS — I10 HYPERTENSION, UNSPECIFIED TYPE: ICD-10-CM

## 2021-03-05 DIAGNOSIS — E11.65 UNCONTROLLED TYPE 2 DIABETES MELLITUS WITH HYPERGLYCEMIA (HCC): ICD-10-CM

## 2021-03-09 RX ORDER — INSULIN GLARGINE 100 [IU]/ML
30 INJECTION, SOLUTION SUBCUTANEOUS DAILY
Qty: 18 ML | Refills: 2 | Status: SHIPPED | OUTPATIENT
Start: 2021-03-09 | End: 2021-07-23 | Stop reason: SDUPTHER

## 2021-03-10 DIAGNOSIS — E11.9 DIABETES (HCC): ICD-10-CM

## 2021-03-17 ENCOUNTER — OFFICE VISIT (OUTPATIENT)
Dept: FAMILY MEDICINE CLINIC | Facility: CLINIC | Age: 36
End: 2021-03-17
Payer: MEDICARE

## 2021-03-17 VITALS
TEMPERATURE: 97.1 F | DIASTOLIC BLOOD PRESSURE: 98 MMHG | OXYGEN SATURATION: 98 % | BODY MASS INDEX: 43.19 KG/M2 | SYSTOLIC BLOOD PRESSURE: 130 MMHG | RESPIRATION RATE: 20 BRPM | WEIGHT: 228.6 LBS | HEART RATE: 96 BPM

## 2021-03-17 DIAGNOSIS — M79.672 LEFT FOOT PAIN: Primary | ICD-10-CM

## 2021-03-17 PROCEDURE — 99213 OFFICE O/P EST LOW 20 MIN: CPT | Performed by: FAMILY MEDICINE

## 2021-03-17 NOTE — PROGRESS NOTES
Family Medicine Follow-Up Office Visit  Mishel Whittaker 30 y o  female   MRN: [id number] : 1985  ENCOUNTER: 3/17/2021 3:13 PM    Assessment and Plan   Left foot pain  The patient has severe left foot pain, primarily at the left 5th MPJ and surrounding soft tissues  She was recently seen by [facility] but has not had xrays or any assistive devices  Plan: Elevate left foot, apply cool compresses  Off work to rest until   Voltaren gel ordered  Friend will help her get to xray tomorrow  Will follow up with Dr Angelyn Snellen next week  Chief Complaint     Chief Complaint   Patient presents with    Follow-up     l foot pain did see podiatry needs xray had this for months       History of Present Illness   Mishel Whittaker is a 30y o -year-old female who presents today for acute worsening left foot pain and left small toe swelling  This has been present for more than 1 week and is not associated with any trauma  She did see DPM and was sent for xrays she has not yet obtained  Review of Systems   Review of Systems   Constitutional: Negative for chills and fever  Respiratory: Negative for shortness of breath  Cardiovascular: Negative for chest pain  Musculoskeletal: Positive for gait problem and joint swelling (left 5th foot MPJ)         Active Problem List     Patient Active Problem List   Diagnosis    Encounter for gynecological examination without abnormal finding    Possible exposure to STD    Headache    Hypertension    Type 2 diabetes mellitus with diabetic polyneuropathy, with long-term current use of insulin ([facility] [address] )    Hypertensive urgency    Paresthesia    [facility]ch[facility] disease) ([facility])    Nicotine dependence    Bipolar disorder ([facility] [address] )    Syncope    Cellulitis    Toe infection    Toe pain, left    Deliberate self-cutting    Left foot pain       Past Medical History, Past Surgical History, Family History, and Social History were reviewed and updated today as appropriate  Objective   /98 (BP Location: Left arm, Patient Position: Sitting, Cuff Size: Large)   Pulse 96   Temp (!) 97 1 °F (36 2 °C)   Resp 20   Wt 104 kg (228 lb 9 6 oz)   SpO2 98%   BMI 43 19 kg/m²     Physical Exam  Constitutional:       General: She is not in acute distress (pain at rest and marked difficulty walking)  HENT:      Head: Atraumatic  Eyes:      Extraocular Movements: Extraocular movements intact  Musculoskeletal:         General: Tenderness (extreme left 5th MPJ and distal metatarsal ) present  No swelling  Skin:     General: Skin is warm and dry  Neurological:      Mental Status: Mental status is at baseline           Pertinent Laboratory/Diagnostic Studies:  Lab Results   Component Value Date    BUN 26 (H) 09/22/2020    CREATININE 0 72 09/22/2020    CALCIUM 8 3 (L) 09/22/2020    K 4 5 09/22/2020    CO2 25 09/22/2020     09/22/2020     Lab Results   Component Value Date    ALT 22 09/22/2020    AST 18 09/22/2020    ALKPHOS 102 4 09/22/2020       Lab Results   Component Value Date    WBC 8 80 09/22/2020    HGB 13 7 09/22/2020    HCT 37 8 09/22/2020    MCV 77 (L) 09/22/2020     09/22/2020       No results found for: TSH    No results found for: CHOL  Lab Results   Component Value Date    TRIG 139 08/07/2020     Lab Results   Component Value Date    HDL 46 08/07/2020     Lab Results   Component Value Date    LDLCALC 111 (H) 08/07/2020     Lab Results   Component Value Date    HGBA1C 8 9 (A) 11/24/2020       Results for orders placed or performed in visit on 01/08/21   Novel Coronavirus 2019(COVID-19), Influenza A/B, RSV [facility] Collected at [facility] or [facility]    Specimen: Nose; Nares   Result Value Ref Range    SARS-COV-2 Not Detected Not Detected    INFLUENZA A PCR Not Detected Not Detected    INFLUENZA B PCR Not Detected Not Detected    RSV PCR Not Detected Not Detected       Orders Placed This Encounter   Procedures    XR foot 3+ vw left Current Medications     Current Outpatient Medications   Medication Sig Dispense Refill    Acetaminophen Extra Strength 500 MG tablet take 1 tablet by mouth every 6 hours if needed for mild pain or moderate pain for up to 14 days 56 tablet 0    atorvastatin (LIPITOR) 40 mg tablet       chlorproMAZINE (THORAZINE) 100 mg tablet Take 100 mg by mouth daily at bedtime      Dulaglutide (Trulicity) 1 5 HM/4 5RH SOPN Inject 0 5 mL (1 5 mg total) under the skin once a week 2 mL 0    fluticasone-salmeterol (ADVAIR, WIXELA) 100-50 mcg/dose inhaler Inhale 1 puff 2 (two) times a day Rinse mouth after use   60 each 0    gabapentin (NEURONTIN) 300 mg capsule Take 2 capsules (600 mg total) by mouth 3 (three) times a day 180 capsule 0    hydrOXYzine HCL (ATARAX) 50 mg tablet Take 50 mg by mouth 3 (three) times a day      insulin lispro (HumaLOG) 100 units/mL injection Inject under the skin 2 (two) times a day      lamoTRIgine (LaMICtal) 25 mg tablet Take 1 tablet (25 mg total) by mouth daily 30 tablet 0    Lantus SoloStar 100 units/mL injection pen INJECT 30 UNITS UNDER THE SKIN DAILY 18 mL 2    lisinopril (ZESTRIL) 10 mg tablet TAKE 2 TABLETS BY MOUTH DAILY 180 tablet 0    lurasidone (Latuda) 40 mg tablet Take 20 mg by mouth daily with breakfast      metFORMIN (GLUCOPHAGE) 1000 MG tablet TAKE ONE (1) TABLET BY MOUTH TWICE DAILY WITH FOOD 60 tablet 0    prazosin (MINIPRESS) 1 mg capsule Take 1 mg by mouth daily at bedtime      QUEtiapine (SEROquel) 300 mg tablet take 1 tablet by mouth once daily 30 tablet 0    traZODone (DESYREL) 100 mg tablet TAKE 2 TABLETS BY MOUTH DAILY AT BEDTIME 60 tablet 0    albuterol (PROVENTIL HFA,VENTOLIN HFA) 90 mcg/act inhaler INHALE 2 PUFFS EVERY 6 HOURS AS NEEDED FOR WHEEZING OR SHORTNESS OF BREATH 8 5 g 0    ASPIRIN 81 PO Take 81 mg by mouth daily      BD Pen Needle Micro U/F 32G X 6 MM MISC use 1 NEEDLE to inject MEDICATION subcutaneously twice a day 100 each 0    Blood Glucose Monitoring Suppl (True Metrix Air Glucose Meter) w/Device KIT use as directed 1 kit 0    Blood Pressure Monitoring (Blood Pressure Monitor Automat) KATLYN Use Daily as Directed 1 Device 0    Diclofenac Sodium (VOLTAREN) 1 % Apply 2 g topically 4 (four) times a day 1 Tube 0    fluconazole (DIFLUCAN) 150 mg tablet       fluticasone-salmeterol (Advair HFA) 115-21 MCG/ACT inhaler Inhale 2 puffs 2 (two) times a day Rinse mouth after use  1 Inhaler 2    GlucaGen HypoKit 1 MG injection INJECT 1MG UNDER THE SKIN ONCE AS NEEDED FOR LOW BLOOD SUGAR FOR UP TO 1 DOSE  INJECT 1MG UNDER THE SKIN AS NEEDED FOR BLOOD SUGAR LESS THAN 70 FOR UP TO 1 DOSW IF UNABLE TO SWALLOW 1 each 0    Glucagon Emergency 1 MG injection       glucose blood ([employer]S Glucose Meter Test Strips) test strip Use as instructed to check blood sugar every day 100 each 3    glucose blood test strip Use one strip 3 times daily for blood glucose testing  100 each 0    glucose monitoring kit (FREESTYLE) monitoring kit Use 1 each daily Use one each daily to check blood sugars  Please refill with freestyle ed or with whichever brand is covered by insurance 1 each 0    Lancets (accu-chek soft touch) lancets Use as instructed 100 each 0    Lancets (freestyle) lancets Use as instructed, check blood sugar daily 100 each 3     No current facility-administered medications for this visit          ALLERGIES:  No Known Allergies    Health Maintenance     Health Maintenance   Topic Date Due    Medicare Annual Wellness Visit (AWV)  Never done    DM Eye Exam  Never done    Influenza Vaccine (1) 09/01/2020    BMI: Followup Plan  02/10/2021    HEMOGLOBIN A1C  05/24/2021    Diabetic Foot Exam  08/11/2021    BMI: Adult  03/17/2022    Cervical Cancer Screening  02/10/2025    DTaP,Tdap,and Td Vaccines (2 - Td) 04/10/2028    HIV Screening  Completed    Pneumococcal Vaccine: Pediatrics (0 to 5 Years) and At-Risk Patients (6 to 59 Years)  Completed    HIB Vaccine  Aged Out    Hepatitis B Vaccine  Aged Out    IPV Vaccine  Aged Out    Hepatitis A Vaccine  Aged Out    Meningococcal ACWY Vaccine  Aged Out    HPV Vaccine  Aged Out     Immunization History   Administered Date(s) Administered    INFLUENZA 04/10/2018, 04/10/2018, 11/28/2018, 11/28/2018    Pneumococcal Polysaccharide PPV23 04/10/2018    Tdap 04/10/2018         Roland Garcia MD   [address]  3/17/2021  3:13 PM    Parts of this note were dictated using revoPT dictation software and may have sounds-like errors due to variation in pronunciation

## 2021-03-17 NOTE — LETTER
March 17, 2021     Patient: Amy Menard   YOB: 1985   Date of Visit: 3/17/2021       To Whom it May Concern:    Harvis Brittle is under my professional care  She was seen in my office on 3/17/2021  She may return to work on 03/22/21  If you have any questions or concerns, please don't hesitate to call           Sincerely,          Juana Kam MD        CC: No Recipients

## 2021-03-17 NOTE — ASSESSMENT & PLAN NOTE
The patient has severe left foot pain, primarily at the left 5th MPJ and surrounding soft tissues  She was recently seen by [facility] but has not had xrays or any assistive devices  Plan: Elevate left foot, apply cool compresses  Off work to rest until 03/22  Voltaren gel ordered  Friend will help her get to xray tomorrow  Will follow up with Dr Arnetta Apley next week

## 2021-03-18 DIAGNOSIS — E11.65 UNCONTROLLED TYPE 2 DIABETES MELLITUS WITH HYPERGLYCEMIA (HCC): ICD-10-CM

## 2021-03-18 RX ORDER — DULAGLUTIDE 1.5 MG/.5ML
INJECTION, SOLUTION SUBCUTANEOUS
Qty: 2 ML | Refills: 0 | Status: SHIPPED | OUTPATIENT
Start: 2021-03-18 | End: 2021-03-18 | Stop reason: SDUPTHER

## 2021-03-18 RX ORDER — DULAGLUTIDE 1.5 MG/.5ML
INJECTION, SOLUTION SUBCUTANEOUS
Qty: 2 ML | Refills: 0 | Status: SHIPPED | OUTPATIENT
Start: 2021-03-18 | End: 2021-07-23 | Stop reason: SDUPTHER

## 2021-03-19 ENCOUNTER — HOSPITAL ENCOUNTER (OUTPATIENT)
Dept: RADIOLOGY | Facility: HOSPITAL | Age: 36
Discharge: HOME | End: 2021-03-19
Payer: MEDICARE

## 2021-03-19 DIAGNOSIS — M79.672 LEFT FOOT PAIN: ICD-10-CM

## 2021-03-19 PROCEDURE — 73630 X-RAY EXAM OF FOOT: CPT

## 2021-03-22 DIAGNOSIS — J44.9 COPD (CHRONIC OBSTRUCTIVE PULMONARY DISEASE) (HCC): ICD-10-CM

## 2021-03-22 RX ORDER — ALBUTEROL SULFATE 90 UG/1
2 AEROSOL, METERED RESPIRATORY (INHALATION) EVERY 6 HOURS PRN
Qty: 8.5 G | Refills: 3 | Status: SHIPPED | OUTPATIENT
Start: 2021-03-22 | End: 2021-08-22 | Stop reason: ALTCHOICE

## 2021-04-01 ENCOUNTER — TELEPHONE (OUTPATIENT)
Dept: FAMILY MEDICINE CLINIC | Facility: CLINIC | Age: 36
End: 2021-04-01

## 2021-04-01 NOTE — TELEPHONE ENCOUNTER
I called to Monae Moore her know that I did call and speak to milagros and faxed the script for BP cuff and confirmed  Receipt of bp cuff, I also provided by phone the insurance info to the representative at [place] for her and her wife

## 2021-04-03 ENCOUNTER — APPOINTMENT (EMERGENCY)
Dept: RADIOLOGY | Facility: HOSPITAL | Age: 36
End: 2021-04-03
Payer: MEDICARE

## 2021-04-03 ENCOUNTER — HOSPITAL ENCOUNTER (EMERGENCY)
Facility: HOSPITAL | Age: 36
Discharge: HOME | End: 2021-04-03
Attending: EMERGENCY MEDICINE | Admitting: EMERGENCY MEDICINE
Payer: MEDICARE

## 2021-04-03 VITALS
HEART RATE: 90 BPM | OXYGEN SATURATION: 98 % | RESPIRATION RATE: 20 BRPM | DIASTOLIC BLOOD PRESSURE: 93 MMHG | TEMPERATURE: 97.5 F | SYSTOLIC BLOOD PRESSURE: 146 MMHG

## 2021-04-03 DIAGNOSIS — M79.672 FOOT PAIN, LEFT: Primary | ICD-10-CM

## 2021-04-03 DIAGNOSIS — S91.309A WOUND OF FOOT: ICD-10-CM

## 2021-04-03 LAB — GLUCOSE SERPL-MCNC: 333 MG/DL (ref 65–140)

## 2021-04-03 PROCEDURE — 99283 EMERGENCY DEPT VISIT LOW MDM: CPT

## 2021-04-03 PROCEDURE — 73630 X-RAY EXAM OF FOOT: CPT

## 2021-04-03 PROCEDURE — 99284 EMERGENCY DEPT VISIT MOD MDM: CPT | Performed by: EMERGENCY MEDICINE

## 2021-04-03 PROCEDURE — 82948 REAGENT STRIP/BLOOD GLUCOSE: CPT

## 2021-04-03 PROCEDURE — 96372 THER/PROPH/DIAG INJ SC/IM: CPT

## 2021-04-03 RX ORDER — NAPROXEN 500 MG/1
500 TABLET ORAL 2 TIMES DAILY WITH MEALS
Qty: 30 TABLET | Refills: 0 | Status: SHIPPED | OUTPATIENT
Start: 2021-04-03 | End: 2021-08-22

## 2021-04-03 RX ORDER — CEPHALEXIN 500 MG/1
500 CAPSULE ORAL EVERY 6 HOURS SCHEDULED
Qty: 28 CAPSULE | Refills: 0 | Status: SHIPPED | OUTPATIENT
Start: 2021-04-03 | End: 2021-04-10

## 2021-04-03 RX ORDER — ACETAMINOPHEN 325 MG/1
975 TABLET ORAL ONCE
Status: COMPLETED | OUTPATIENT
Start: 2021-04-03 | End: 2021-04-03

## 2021-04-03 RX ORDER — OXYCODONE HYDROCHLORIDE 5 MG/1
5 TABLET ORAL ONCE
Status: COMPLETED | OUTPATIENT
Start: 2021-04-03 | End: 2021-04-03

## 2021-04-03 RX ORDER — KETOROLAC TROMETHAMINE 30 MG/ML
15 INJECTION, SOLUTION INTRAMUSCULAR; INTRAVENOUS ONCE
Status: COMPLETED | OUTPATIENT
Start: 2021-04-03 | End: 2021-04-03

## 2021-04-03 RX ADMIN — OXYCODONE HYDROCHLORIDE 5 MG: 5 TABLET ORAL at 09:35

## 2021-04-03 RX ADMIN — ACETAMINOPHEN 975 MG: 325 TABLET, FILM COATED ORAL at 09:06

## 2021-04-03 RX ADMIN — KETOROLAC TROMETHAMINE 15 MG: 30 INJECTION, SOLUTION INTRAMUSCULAR at 09:08

## 2021-04-03 NOTE — ED PROVIDER NOTES
History  Chief Complaint   Patient presents with    Foot Pain     pt with a hx of diabetes and neuropathy c/o left outer foot pain x months, have seen podiatry, "they are waiting for it to heal, so I can have surgery"     67-year-old female with history of diabetes and neuropathy presenting for left foot pain  Patient says she has seen a podiatrist in the past for  She has a wound to the left toe which she says the podiatrist is waiting to heal for he can do surgery    She denies any increasing redness or drainage from the wound  Patient has been seen her in the past for her foot pain  She is on gabapentin at home  Prior to Admission Medications   Prescriptions Last Dose Informant Patient Reported? Taking? ASPIRIN 81 PO   Yes No   Sig: Take 81 mg by mouth daily   Acetaminophen Extra Strength 500 MG tablet   No No   Sig: take 1 tablet by mouth every 6 hours if needed for mild pain or moderate pain for up to 14 days   BD Pen Needle Micro U/F 32G X 6 MM MISC   No No   Sig: use 1 NEEDLE to inject MEDICATION subcutaneously twice a day   Blood Glucose Monitoring Suppl (True Metrix Air Glucose Meter) w/Device KIT   No No   Sig: use as directed   Blood Pressure Monitoring (Blood Pressure Monitor Automat) KATLYN   No No   Sig: Use Daily as Directed   Diclofenac Sodium (VOLTAREN) 1 %   No No   Sig: Apply 2 g topically 4 (four) times a day   Dulaglutide (Trulicity) 1 5 OL/1 5YN SOPN   No No   Sig: Tony Roberto   GlucaGen HypoKit 1 MG injection   No No   Sig: INJECT 1MG UNDER THE SKIN ONCE AS NEEDED FOR LOW BLOOD SUGAR FOR UP TO 1 DOSE   INJECT 1MG UNDER THE SKIN AS NEEDED FOR BLOOD SUGAR LESS THAN 70 FOR UP TO 1 DOSW IF UNABLE TO SWALLOW   Glucagon Emergency 1 MG injection   Yes No   Lancets (accu-chek soft touch) lancets   No No   Sig: Use as instructed   Lancets (freestyle) lancets   No No   Sig: Use as instructed, check blood sugar daily   Lantus SoloStar 100 units/mL injection pen   No No   Sig: INJECT 30 UNITS UNDER THE SKIN DAILY   QUEtiapine (SEROquel) 300 mg tablet   No No   Sig: take 1 tablet by mouth once daily   albuterol (PROVENTIL HFA,VENTOLIN HFA) 90 mcg/act inhaler   No No   Sig: Inhale 2 puffs every 6 (six) hours as needed for wheezing   atorvastatin (LIPITOR) 40 mg tablet   Yes No   chlorproMAZINE (THORAZINE) 100 mg tablet   Yes No   Sig: Take 100 mg by mouth daily at bedtime   fluconazole (DIFLUCAN) 150 mg tablet   Yes No   fluticasone-salmeterol (ADVAIR, WIXELA) 100-50 mcg/dose inhaler   No No   Sig: Inhale 1 puff 2 (two) times a day Rinse mouth after use  fluticasone-salmeterol (Advair HFA) 115-21 MCG/ACT inhaler   No No   Sig: Inhale 2 puffs 2 (two) times a day Rinse mouth after use    gabapentin (NEURONTIN) 300 mg capsule   No No   Sig: Take 2 capsules (600 mg total) by mouth 3 (three) times a day   glucose blood (CVS Glucose Meter Test Strips) test strip   No No   Sig: Use as instructed to check blood sugar every day   glucose blood test strip   No No   Sig: Use one strip 3 times daily for blood glucose testing  glucose monitoring kit (FREESTYLE) monitoring kit   No No   Sig: Use 1 each daily Use one each daily to check blood sugars   Please refill with freestyle ed or with whichever brand is covered by insurance   hydrOXYzine HCL (ATARAX) 50 mg tablet   Yes No   Sig: Take 50 mg by mouth 3 (three) times a day   insulin lispro (HumaLOG) 100 units/mL injection  Self Yes No   Sig: Inject under the skin 2 (two) times a day   lamoTRIgine (LaMICtal) 25 mg tablet   No No   Sig: Take 1 tablet (25 mg total) by mouth daily   lisinopril (ZESTRIL) 10 mg tablet   No No   Sig: TAKE 2 TABLETS BY MOUTH DAILY   lurasidone (Latuda) 40 mg tablet   Yes No   Sig: Take 20 mg by mouth daily with breakfast   metFORMIN (GLUCOPHAGE) 1000 MG tablet   No No   Sig: TAKE ONE (1) TABLET BY MOUTH TWICE DAILY WITH FOOD   prazosin (MINIPRESS) 1 mg capsule   Yes No   Sig: Take 1 mg by mouth daily at bedtime   traZODone (DESYREL) 100 mg tablet   No No   Sig: TAKE 2 TABLETS BY MOUTH DAILY AT BEDTIME      Facility-Administered Medications: None       Past Medical History:   Diagnosis Date    Asthma     Bipolar 1 disorder (HCC)     COPD (chronic obstructive pulmonary disease) (HCC)     Depression     Diabetes mellitus (HCC)     Hypertension     Psychiatric disorder     PTSD (post-traumatic stress disorder)     Tendonitis        Past Surgical History:   Procedure Laterality Date     SECTION      EAR SURGERY         Family History   Problem Relation Age of Onset    Hypertension Mother     Diabetes Mother     HIV Mother     Heart disease Mother     No Known Problems Father      I have reviewed and agree with the history as documented  E-Cigarette/Vaping    E-Cigarette Use Never User      E-Cigarette/Vaping Substances    Nicotine No     THC No     CBD No     Flavoring No     Other No     Unknown No      Social History     Tobacco Use    Smoking status: Current Every Day Smoker     Packs/day: 0 50     Types: Cigarettes    Smokeless tobacco: Never Used   Substance Use Topics    Alcohol use: Not Currently    Drug use: Not Currently     Types: Cocaine, Marijuana     Comment: 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       Review of Systems   Constitutional: Negative for fever and unexpected weight change  HENT: Negative for congestion, ear pain, sore throat and trouble swallowing  Eyes: Negative for pain and redness  Respiratory: Negative for cough, chest tightness and shortness of breath  Cardiovascular: Negative for chest pain and leg swelling  Gastrointestinal: Negative for abdominal distention, abdominal pain, diarrhea and vomiting  Endocrine: Negative for polyuria  Genitourinary: Negative for dysuria, hematuria, pelvic pain and vaginal bleeding  Musculoskeletal: Positive for arthralgias (left foot)  Negative for back pain and myalgias  Skin: Positive for wound (left foot)   Negative for rash    Neurological: Negative for dizziness, syncope, weakness, light-headedness and headaches  Physical Exam  Physical Exam  Vitals signs and nursing note reviewed  Constitutional:       General: She is not in acute distress  Appearance: She is well-developed  HENT:      Head: Normocephalic and atraumatic  Right Ear: External ear normal       Left Ear: External ear normal       Mouth/Throat:      Pharynx: No oropharyngeal exudate  Eyes:      Conjunctiva/sclera: Conjunctivae normal       Pupils: Pupils are equal, round, and reactive to light  Neck:      Musculoskeletal: Normal range of motion and neck supple  Cardiovascular:      Rate and Rhythm: Normal rate and regular rhythm  Heart sounds: Normal heart sounds  No murmur  No friction rub  No gallop  Pulmonary:      Effort: Pulmonary effort is normal  No respiratory distress  Breath sounds: Normal breath sounds  No wheezing or rales  Abdominal:      General: There is no distension  Palpations: Abdomen is soft  Tenderness: There is no abdominal tenderness  There is no guarding  Musculoskeletal: Normal range of motion  General: No tenderness or deformity  Feet:    Lymphadenopathy:      Cervical: No cervical adenopathy  Skin:     General: Skin is warm and dry  Neurological:      Mental Status: She is alert and oriented to person, place, and time  Cranial Nerves: No cranial nerve deficit  Sensory: No sensory deficit  Motor: No abnormal muscle tone           Vital Signs  ED Triage Vitals   Temperature Pulse Respirations Blood Pressure SpO2   04/03/21 0833 04/03/21 0833 04/03/21 0833 04/03/21 0835 04/03/21 0833   97 5 °F (36 4 °C) 90 20 146/93 98 %      Temp Source Heart Rate Source Patient Position - Orthostatic VS BP Location FiO2 (%)   04/03/21 0833 04/03/21 0833 04/03/21 0833 04/03/21 0833 --   Oral Monitor Lying Left arm       Pain Score       04/03/21 0833       Worst Possible Pain Vitals:    04/03/21 0833 04/03/21 0835   BP:  146/93   Pulse: 90    Patient Position - Orthostatic VS: Lying Lying         Visual Acuity      ED Medications  Medications   ketorolac (TORADOL) injection 15 mg (15 mg Intramuscular Given 4/3/21 0908)   acetaminophen (TYLENOL) tablet 975 mg (975 mg Oral Given 4/3/21 0906)   oxyCODONE (ROXICODONE) IR tablet 5 mg (5 mg Oral Given 4/3/21 0935)       Diagnostic Studies  Results Reviewed     None                 XR foot 3+ views LEFT    (Results Pending)              Procedures  Procedures         ED Course                                           MDM  Number of Diagnoses or Management Options  Foot pain, left: Wound of foot:   Diagnosis management comments: 31 yo F presenting for left foot pain  Has been seen in the past for this  History of neuropathy  Sees a podiatrist last saw 2 weeks ago  Has a small wound to the left foot  No increasing redness or drainage  No fevers or chills  Vitals within normal limits  No significant cellulitis on exam   Will obtain x-ray of left foot  Given increasing pain, will treat with Keflex  Patient told it is important that she follows up with a podiatrist       Disposition  Final diagnoses: Foot pain, left   Wound of foot - left     Time reflects when diagnosis was documented in both MDM as applicable and the Disposition within this note     Time User Action Codes Description Comment    4/3/2021  9:46 AM Vera Later Add [H37 935] Foot pain, left     4/3/2021  9:47 AM Vera Later Add [C92 414F] Wound of foot     4/3/2021  9:47 AM Vera Later Modify [S91 309A] Wound of foot left      ED Disposition     ED Disposition Condition Date/Time Comment    Discharge Stable Sat Apr 3, 2021  9:46 AM Jannie Andres discharge to home/self care              Follow-up Information     Follow up With Specialties Details Why Contact Info    your podiatrist  Schedule an appointment as soon as possible for a visit  For follow up of foot pain           Patient's Medications   Discharge Prescriptions    CEPHALEXIN (KEFLEX) 500 MG CAPSULE    Take 1 capsule (500 mg total) by mouth every 6 (six) hours for 7 days       Start Date: 4/3/2021  End Date: 4/10/2021       Order Dose: 500 mg       Quantity: 28 capsule    Refills: 0    NAPROXEN (NAPROSYN) 500 MG TABLET    Take 1 tablet (500 mg total) by mouth 2 (two) times a day with meals       Start Date: 4/3/2021  End Date: --       Order Dose: 500 mg       Quantity: 30 tablet    Refills: 0     No discharge procedures on file      PDMP Review       Value Time User    PDMP Reviewed  Yes 9/22/2020  7:47 PM Taylor Teague MD          ED Provider  Electronically Signed by           Laan Bond DO  04/03/21 1838

## 2021-04-03 NOTE — Clinical Note
Axel Guerrero was seen and treated in our emergency department on 4/3/2021  Diagnosis: foot pain, foot wound    Tony    She may return on this date: 04/04/2021         If you have any questions or concerns, please don't hesitate to call        Axel Villasenor DO    ______________________________           _______________          _______________  Hospital Representative                              Date                                Time

## 2021-04-13 DIAGNOSIS — J44.9 CHRONIC OBSTRUCTIVE PULMONARY DISEASE, UNSPECIFIED COPD TYPE (HCC): ICD-10-CM

## 2021-04-14 RX ORDER — FLUTICASONE PROPIONATE AND SALMETEROL XINAFOATE 115; 21 UG/1; UG/1
AEROSOL, METERED RESPIRATORY (INHALATION)
Qty: 1 INHALER | Refills: 3 | Status: SHIPPED | OUTPATIENT
Start: 2021-04-14 | End: 2021-08-19

## 2021-04-15 ENCOUNTER — APPOINTMENT (EMERGENCY)
Dept: RADIOLOGY | Facility: HOSPITAL | Age: 36
End: 2021-04-15
Payer: MEDICARE

## 2021-04-15 ENCOUNTER — HOSPITAL ENCOUNTER (EMERGENCY)
Facility: HOSPITAL | Age: 36
Discharge: HOME | End: 2021-04-15
Attending: EMERGENCY MEDICINE | Admitting: EMERGENCY MEDICINE
Payer: MEDICARE

## 2021-04-15 VITALS
HEART RATE: 93 BPM | TEMPERATURE: 98.7 F | SYSTOLIC BLOOD PRESSURE: 151 MMHG | BODY MASS INDEX: 43.27 KG/M2 | OXYGEN SATURATION: 100 % | WEIGHT: 229 LBS | RESPIRATION RATE: 18 BRPM | DIASTOLIC BLOOD PRESSURE: 81 MMHG

## 2021-04-15 DIAGNOSIS — M79.672 LEFT FOOT PAIN: ICD-10-CM

## 2021-04-15 DIAGNOSIS — E11.621 DIABETIC FOOT ULCER (HCC): Primary | ICD-10-CM

## 2021-04-15 DIAGNOSIS — L97.509 DIABETIC FOOT ULCER (HCC): Primary | ICD-10-CM

## 2021-04-15 PROCEDURE — 99284 EMERGENCY DEPT VISIT MOD MDM: CPT | Performed by: EMERGENCY MEDICINE

## 2021-04-15 PROCEDURE — U0003 INFECTIOUS AGENT DETECTION BY NUCLEIC ACID (DNA OR RNA); SEVERE ACUTE RESPIRATORY SYNDROME CORONAVIRUS 2 (SARS-COV-2) (CORONAVIRUS DISEASE [COVID-19]), AMPLIFIED PROBE TECHNIQUE, MAKING USE OF HIGH THROUGHPUT TECHNOLOGIES AS DESCRIBED BY CMS-2020-01-R: HCPCS | Performed by: EMERGENCY MEDICINE

## 2021-04-15 PROCEDURE — U0005 INFEC AGEN DETEC AMPLI PROBE: HCPCS | Performed by: EMERGENCY MEDICINE

## 2021-04-15 PROCEDURE — 99284 EMERGENCY DEPT VISIT MOD MDM: CPT

## 2021-04-15 RX ORDER — DICLOFENAC SODIUM 25 MG/1
50 TABLET, DELAYED RELEASE ORAL 2 TIMES DAILY PRN
Qty: 14 TABLET | Refills: 0 | Status: SHIPPED | OUTPATIENT
Start: 2021-04-15 | End: 2021-08-22

## 2021-04-15 RX ORDER — DOXYCYCLINE HYCLATE 100 MG/1
100 CAPSULE ORAL 2 TIMES DAILY
Qty: 14 CAPSULE | Refills: 0 | Status: SHIPPED | OUTPATIENT
Start: 2021-04-15 | End: 2021-04-22

## 2021-04-15 RX ORDER — DOXYCYCLINE HYCLATE 100 MG/1
100 CAPSULE ORAL ONCE
Status: COMPLETED | OUTPATIENT
Start: 2021-04-15 | End: 2021-04-15

## 2021-04-15 RX ADMIN — DOXYCYCLINE 100 MG: 100 CAPSULE ORAL at 15:06

## 2021-04-15 NOTE — ED PROVIDER NOTES
History  Chief Complaint   Patient presents with    Foot Ulcer     Pt reports ulcer on her left toe      44-year-old female presenting by ambulance for left toe pain  This appears to be chronic and she used to follow up with a podiatrist but she no longer sees them  Throughout the encounter, patient refusing to get off of her cellphone and continues her normal conversations with her mother on the other side of the phone  Patient will intermittently stopped talking to her mother and request a COVID test and a hard-soled boot  Apparently, patient was exposed to [employer] at work a few days ago but denies any symptoms          Prior to Admission Medications   Prescriptions Last Dose Informant Patient Reported? Taking? ASPIRIN 81 PO   Yes No   Sig: Take 81 mg by mouth daily   Acetaminophen Extra Strength 500 MG tablet   No No   Sig: take 1 tablet by mouth every 6 hours if needed for mild pain or moderate pain for up to 14 days   Advair -21 MCG/ACT inhaler   No No   Sig: INHALE 2 PUFFS BY MOUTH TWICE DAILY   RINSE MOUTH AFTER USE   BD Pen Needle Micro U/F 32G X 6 MM MISC   No No   Sig: use 1 NEEDLE to inject MEDICATION subcutaneously twice a day   Blood Glucose Monitoring Suppl (True Metrix Air Glucose Meter) w/Device KIT   No No   Sig: use as directed   Blood Pressure Monitoring (Blood Pressure Monitor Automat) KATLYN   No No   Sig: Use Daily as Directed   Diclofenac Sodium (VOLTAREN) 1 %   No No   Sig: Apply 2 g topically 4 (four) times a day   Dulaglutide (Trulicity) 1 5 RW/5 4CZ SOPN   No No   Sig: Tony Roberto   GlucaGen HypoKit 1 MG injection   No No   Sig: INJECT 1MG UNDER THE SKIN ONCE AS NEEDED FOR LOW BLOOD SUGAR FOR UP TO 1 DOSE   INJECT 1MG UNDER THE SKIN AS NEEDED FOR BLOOD SUGAR LESS THAN 70 FOR UP TO 1 DOSW IF UNABLE TO SWALLOW   Glucagon Emergency 1 MG injection   Yes No   Lancets (accu-chek soft touch) lancets   No No   Sig: Use as instructed   Lancets (freestyle) lancets   No No   Sig: Use as instructed, check blood sugar daily   Lantus SoloStar 100 units/mL injection pen   No No   Sig: INJECT 30 UNITS UNDER THE SKIN DAILY   QUEtiapine (SEROquel) 300 mg tablet   No No   Sig: take 1 tablet by mouth once daily   albuterol (PROVENTIL HFA,VENTOLIN HFA) 90 mcg/act inhaler   No No   Sig: Inhale 2 puffs every 6 (six) hours as needed for wheezing   atorvastatin (LIPITOR) 40 mg tablet   Yes No   chlorproMAZINE (THORAZINE) 100 mg tablet   Yes No   Sig: Take 100 mg by mouth daily at bedtime   fluconazole (DIFLUCAN) 150 mg tablet   Yes No   gabapentin (NEURONTIN) 300 mg capsule   No No   Sig: Take 2 capsules (600 mg total) by mouth 3 (three) times a day   glucose blood ([employer] Glucose Meter Test Strips) test strip   No No   Sig: Use as instructed to check blood sugar every day   glucose blood test strip   No No   Sig: Use one strip 3 times daily for blood glucose testing  glucose monitoring kit (FREESTYLE) monitoring kit   No No   Sig: Use 1 each daily Use one each daily to check blood sugars   Please refill with freestyle ed or with whichever brand is covered by insurance   hydrOXYzine HCL (ATARAX) 50 mg tablet   Yes No   Sig: Take 50 mg by mouth 3 (three) times a day   insulin lispro (HumaLOG) 100 units/mL injection  Self Yes No   Sig: Inject under the skin 2 (two) times a day   lamoTRIgine (LaMICtal) 25 mg tablet   No No   Sig: Take 1 tablet (25 mg total) by mouth daily   lisinopril (ZESTRIL) 10 mg tablet   No No   Sig: TAKE 2 TABLETS BY MOUTH DAILY   lurasidone (Latuda) 40 mg tablet   Yes No   Sig: Take 20 mg by mouth daily with breakfast   metFORMIN (GLUCOPHAGE) 1000 MG tablet   No No   Sig: TAKE ONE (1) TABLET BY MOUTH TWICE DAILY WITH FOOD   naproxen (NAPROSYN) 500 mg tablet   No No   Sig: Take 1 tablet (500 mg total) by mouth 2 (two) times a day with meals   prazosin (MINIPRESS) 1 mg capsule   Yes No   Sig: Take 1 mg by mouth daily at bedtime   traZODone (DESYREL) 100 mg tablet   No No   Sig: TAKE 2 TABLETS BY MOUTH DAILY AT BEDTIME      Facility-Administered Medications: None       Past Medical History:   Diagnosis Date    Asthma     Bipolar 1 disorder (HCC)     COPD (chronic obstructive pulmonary disease) (HCC)     Depression     Diabetes mellitus (HCC)     Hypertension     Psychiatric disorder     PTSD (post-traumatic stress disorder)     Tendonitis        Past Surgical History:   Procedure Laterality Date     SECTION      EAR SURGERY         Family History   Problem Relation Age of Onset    Hypertension Mother     Diabetes Mother     HIV Mother     Heart disease Mother     No Known Problems Father      I have reviewed and agree with the history as documented  E-Cigarette/Vaping    E-Cigarette Use Never User      E-Cigarette/Vaping Substances    Nicotine No     THC No     CBD No     Flavoring No     Other No     Unknown No      Social History     Tobacco Use    Smoking status: Current Every Day Smoker     Packs/day: 0 50     Types: Cigarettes    Smokeless tobacco: Never Used   Substance Use Topics    Alcohol use: Not Currently    Drug use: Not Currently     Types: Cocaine, Marijuana     Comment: 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       Review of Systems   Constitutional: Negative for fever  HENT: Negative for congestion  Eyes: Negative for visual disturbance  Respiratory: Negative for shortness of breath  Cardiovascular: Negative for chest pain  Gastrointestinal: Negative for abdominal pain  Musculoskeletal: Negative for neck pain  Skin: Positive for wound (Left 5th toe)  Neurological: Negative for weakness  Psychiatric/Behavioral: The patient is not nervous/anxious  Physical Exam  Physical Exam  Vitals signs and nursing note reviewed  Constitutional:       General: She is not in acute distress  HENT:      Head: Normocephalic and atraumatic  Mouth/Throat:      Pharynx: No posterior oropharyngeal erythema     Eyes:      Conjunctiva/sclera: Conjunctivae normal    Neck:      Musculoskeletal: No neck rigidity  Cardiovascular:      Rate and Rhythm: Regular rhythm  Pulmonary:      Effort: Pulmonary effort is normal  No respiratory distress  Abdominal:      General: There is no distension  Musculoskeletal:      Comments: Small ulceration and swelling to left lateral 5th MTP   Skin:     General: Skin is warm and dry  Neurological:      Mental Status: She is alert  Mental status is at baseline  Psychiatric:         Mood and Affect: Mood normal          Vital Signs  ED Triage Vitals [04/15/21 1434]   Temperature Pulse Respirations Blood Pressure SpO2   98 7 °F (37 1 °C) 93 18 151/81 100 %      Temp Source Heart Rate Source Patient Position - Orthostatic VS BP Location FiO2 (%)   Oral Monitor Lying Left arm --      Pain Score       8           Vitals:    04/15/21 1434   BP: 151/81   Pulse: 93   Patient Position - Orthostatic VS: Lying         Visual Acuity      ED Medications  Medications   doxycycline hyclate (VIBRAMYCIN) capsule 100 mg (has no administration in time range)       Diagnostic Studies  Results Reviewed     Procedure Component Value Units Date/Time    [id number] Collected: 04/15/21 1447    Lab Status: In process Specimen: Nares from Nasopharyngeal Swab Updated: 04/15/21 1450                 No orders to display              Procedures  Procedures         ED Course                                           MDM  Number of Diagnoses or Management Options  Diagnosis management comments: 52-year-old female presenting by EMS for left 5th toe pain  Patient has small ulceration to left lateral 5th MTP  She is a diabetic  This appears to be chronic  Patient recently had x-rays of her foot showing no osseous abnormality  Patient states she is on Keflex which is not working  Will add doxycycline        Disposition  Final diagnoses:   Diabetic foot ulcer ([facility] [address] )     Time reflects when diagnosis was documented in both MDM as applicable and the Disposition within this note     Time User Action Codes Description Comment    4/15/2021  2:57 PM Tulsa Benson Add [F05 125,  L97 [id number]] Diabetic foot ulcer [facility])       ED Disposition     ED Disposition Condition Date/Time Comment    Discharge Stable Thu Apr 15, 2021  2:57 PM Katherine Ahn discharge to home/self care  Follow-up Information    None         Patient's Medications   Discharge Prescriptions    DICLOFENAC (VOLTAREN) 25 MG EC TABLET    Take 2 tablets (50 mg total) by mouth 2 (two) times a day as needed (pain)       Start Date: 4/15/2021 End Date: --       Order Dose: 50 mg       Quantity: 14 tablet    Refills: 0    DOXYCYCLINE HYCLATE (VIBRAMYCIN) 100 MG CAPSULE    Take 1 capsule (100 mg total) by mouth 2 (two) times a day for 7 days       Start Date: 4/15/2021 End Date: 4/22/2021       Order Dose: 100 mg       Quantity: 14 capsule    Refills: 0     No discharge procedures on file      PDMP Review       Value Time User    PDMP Reviewed  Yes 9/22/2020  7:47 PM Yessi Kent MD          ED Provider  Electronically Signed by           Angelia Dawson DO  04/15/21 9751

## 2021-04-16 LAB — SARS-COV-2 RNA RESP QL NAA+PROBE: NEGATIVE

## 2021-04-23 ENCOUNTER — TELEMEDICINE (OUTPATIENT)
Dept: FAMILY MEDICINE CLINIC | Facility: CLINIC | Age: 36
End: 2021-04-23
Payer: MEDICARE

## 2021-04-23 DIAGNOSIS — M79.672 LEFT FOOT PAIN: Primary | ICD-10-CM

## 2021-04-23 PROCEDURE — 99213 OFFICE O/P EST LOW 20 MIN: CPT | Performed by: FAMILY MEDICINE

## 2021-04-23 RX ORDER — LIDOCAINE 40 MG/G
CREAM TOPICAL DAILY PRN
Status: SHIPPED | OUTPATIENT
Start: 2021-04-23

## 2021-04-23 NOTE — ASSESSMENT & PLAN NOTE
The patient reports she is still experiencing significant left foot pain, despite taking her previously prescribed medications as directed, including medications recently prescribed at an emergency room visit  She states there is no active signs of pus and no bleeding  She was unable to get to the office or do a video visit today  Plan:  Lidocaine gel was ordered, and the patient was advised to follow-up with an in office visit ASAP

## 2021-04-23 NOTE — PROGRESS NOTES
Virtual Brief Telephone Visit    Assessment/Plan:    Problem List Items Addressed This Visit        Other    Left foot pain - Primary       The patient reports she is still experiencing significant left foot pain, despite taking her previously prescribed medications as directed, including medications recently prescribed at an emergency room visit  She states there is no active signs of pus and no bleeding  She was unable to get to the office or do a video visit today  Plan:  Lidocaine gel was ordered, and the patient was advised to follow-up with an in office visit ASAP  Reason for visit is  Continued left foot pain     Encounter provider Lawrence Villela MD    Provider located at [address] [place] [address]  [phone]    Recent Visits  No visits were found meeting these conditions  Showing recent visits within past 7 days and meeting all other requirements     Today's Visits  Date Type Provider Dept   04/23/21 Telemedicine Y[facility] Jules 5711 today's visits and meeting all other requirements     Future Appointments  No visits were found meeting these conditions  Showing future appointments within next 150 days and meeting all other requirements        After connecting through [employer] and patient was informed that this is a secure, HIPAA-compliant platform  She agrees to proceed  , the patient was identified by name and date of birth  Katherine Ahn was informed that this is a telemedicine visit and that the visit is being conducted through [employer] and patient was informed that this is a secure, HIPAA-compliant platform  She agrees to proceed     My office door was closed  No one else was in the room  She acknowledged consent and understanding of privacy and security of the platform  The patient has agreed to participate and understands she can discontinue the visit at any time      Patient is aware this is a billable service  Subjective    Jannie Andres is a 30 y o  female  With continued left foot pain and tenderness, unable to do a video or in-person visit today  The patient was contacted by phone, as she states she is unable to do a video visit at this time  She is also unable to get to the clinic  Her chief complaints are significant and continued left foot pain, despite her most recent visit to the emergency department and prescribed medication  She asked if there is anything else we could do in the interim  She denied any visible signs of pus, or bleeding  She denied any other concerns at this time  Past Medical History:   Diagnosis Date    Asthma     Bipolar [facility] disorder ([facility] )     [facility]     Depression     Diabetes mellitus ([place] [address] )     Hypertension     Psychiatric disorder     PTSD (post-traumatic stress disorder)     Tendonitis        Past Surgical History:   Procedure Laterality Date     SECTION      EAR SURGERY         Current Outpatient Medications   Medication Sig Dispense Refill    Acetaminophen Extra Strength 500 MG tablet take 1 tablet by mouth every 6 hours if needed for mild pain or moderate pain for up to 14 days 56 tablet 0    Advair -21 MCG/ACT inhaler INHALE 2 PUFFS BY MOUTH TWICE DAILY    RINSE MOUTH AFTER USE 1 Inhaler 3    albuterol (PROVENTIL HFA,VENTOLIN HFA) 90 mcg/act inhaler Inhale 2 puffs every 6 (six) hours as needed for wheezing 8 5 g 3    ASPIRIN 81 PO Take 81 mg by mouth daily      atorvastatin (LIPITOR) 40 mg tablet       BD Pen Needle Micro U/F 32G X 6 MM MISC use 1 NEEDLE to inject MEDICATION subcutaneously twice a day 100 each 0    Blood Glucose Monitoring Suppl (True Metrix Air Glucose Meter) w/Device KIT use as directed 1 kit 0    Blood Pressure Monitoring (Blood Pressure Monitor Automat) KATLYN Use Daily as Directed 1 Device 0    chlorproMAZINE (THORAZINE) 100 mg tablet Take 100 mg by mouth daily at bedtime      diclofenac (VOLTAREN) 25 MG EC tablet Take 2 tablets (50 mg total) by mouth 2 (two) times a day as needed (pain) 14 tablet 0    Diclofenac Sodium (VOLTAREN) 1 % APPLY 2 G TOPICALLY 4 (FOUR) TIMES A  g 1    Dulaglutide (Trulicity) 1 5 RB/6 1TT SOPN Tony COLLADO Pardeep 2 mL 0    fluconazole (DIFLUCAN) 150 mg tablet       gabapentin (NEURONTIN) 300 mg capsule Take 2 capsules (600 mg total) by mouth 3 (three) times a day 180 capsule 0    GlucaGen HypoKit 1 MG injection INJECT 1MG UNDER THE SKIN ONCE AS NEEDED FOR LOW BLOOD SUGAR FOR UP TO 1 DOSE  INJECT 1MG UNDER THE SKIN AS NEEDED FOR BLOOD SUGAR LESS THAN 70 FOR UP TO 1 DOSW IF UNABLE TO SWALLOW 1 each 0    Glucagon Emergency 1 MG injection       glucose blood (CVS Glucose Meter Test Strips) test strip Use as instructed to check blood sugar every day 100 each 3    glucose blood test strip Use one strip 3 times daily for blood glucose testing  100 each 0    glucose monitoring kit (FREESTYLE) monitoring kit Use 1 each daily Use one each daily to check blood sugars   Please refill with freestyle ed or with whichever brand is covered by insurance 1 each 0    hydrOXYzine HCL (ATARAX) 50 mg tablet Take 50 mg by mouth 3 (three) times a day      insulin lispro (HumaLOG) 100 units/mL injection Inject under the skin 2 (two) times a day      lamoTRIgine (LaMICtal) 25 mg tablet Take 1 tablet (25 mg total) by mouth daily 30 tablet 0    Lancets (accu-chek soft touch) lancets Use as instructed 100 each 0    Lancets (freestyle) lancets Use as instructed, check blood sugar daily 100 each 3    Lantus SoloStar 100 units/mL injection pen INJECT 30 UNITS UNDER THE SKIN DAILY 18 mL 2    lisinopril (ZESTRIL) 10 mg tablet TAKE 2 TABLETS BY MOUTH DAILY 180 tablet 0    lurasidone (Latuda) 40 mg tablet Take 20 mg by mouth daily with breakfast      metFORMIN (GLUCOPHAGE) 1000 MG tablet TAKE ONE (1) TABLET BY MOUTH TWICE DAILY WITH FOOD 60 tablet 0    naproxen (NAPROSYN) 500 mg tablet Take 1 tablet (500 mg total) by mouth 2 (two) times a day with meals 30 tablet 0    prazosin (MINIPRESS) 1 mg capsule Take 1 mg by mouth daily at bedtime      QUEtiapine (SEROquel) 300 mg tablet take 1 tablet by mouth once daily 30 tablet 0    traZODone (DESYREL) 100 mg tablet TAKE 2 TABLETS BY MOUTH DAILY AT BEDTIME 60 tablet 0     No current facility-administered medications for this visit  No Known Allergies    Review of Systems   Constitutional: Negative for chills and fever  Skin: Negative for wound  Denies any visible pus or signs of left foot bleeding  There were no vitals filed for this visit  I spent 10 minutes directly with the patient during this visit    VIRTUAL VISIT DISCLAIMER    Kimberley Fonseca acknowledges that she has consented to an online visit or consultation  She understands that the online visit is based solely on information provided by her, and that, in the absence of a face-to-face physical evaluation by the physician, the diagnosis she receives is both limited and provisional in terms of accuracy and completeness  This is not intended to replace a full medical face-to-face evaluation by the physician  Kimberley Fonseca understands and accepts these terms

## 2021-04-25 ENCOUNTER — HOSPITAL ENCOUNTER (EMERGENCY)
Facility: HOSPITAL | Age: 36
Discharge: HOME | End: 2021-04-25
Attending: EMERGENCY MEDICINE
Payer: MEDICARE

## 2021-04-25 VITALS
OXYGEN SATURATION: 100 % | WEIGHT: 220 LBS | RESPIRATION RATE: 22 BRPM | TEMPERATURE: 98.7 F | HEART RATE: 87 BPM | DIASTOLIC BLOOD PRESSURE: 91 MMHG | BODY MASS INDEX: 41.54 KG/M2 | SYSTOLIC BLOOD PRESSURE: 153 MMHG | HEIGHT: 61 IN

## 2021-04-25 DIAGNOSIS — R19.7 DIARRHEA: ICD-10-CM

## 2021-04-25 DIAGNOSIS — U07.1 COVID-19: Primary | ICD-10-CM

## 2021-04-25 LAB — SARS-COV-2 RNA RESP QL NAA+PROBE: NEGATIVE

## 2021-04-25 PROCEDURE — U0003 INFECTIOUS AGENT DETECTION BY NUCLEIC ACID (DNA OR RNA); SEVERE ACUTE RESPIRATORY SYNDROME CORONAVIRUS 2 (SARS-COV-2) (CORONAVIRUS DISEASE [COVID-19]), AMPLIFIED PROBE TECHNIQUE, MAKING USE OF HIGH THROUGHPUT TECHNOLOGIES AS DESCRIBED BY CMS-2020-01-R: HCPCS | Performed by: EMERGENCY MEDICINE

## 2021-04-25 PROCEDURE — 99284 EMERGENCY DEPT VISIT MOD MDM: CPT | Performed by: EMERGENCY MEDICINE

## 2021-04-25 PROCEDURE — 96372 THER/PROPH/DIAG INJ SC/IM: CPT

## 2021-04-25 PROCEDURE — 87635 SARS-COV-2 COVID-19 AMP PRB: CPT | Performed by: EMERGENCY MEDICINE

## 2021-04-25 PROCEDURE — 99285 EMERGENCY DEPT VISIT HI MDM: CPT

## 2021-04-25 RX ORDER — FLUTICASONE PROPIONATE 50 MCG
1 SPRAY, SUSPENSION (ML) NASAL DAILY
Qty: 16 G | Refills: 0 | Status: SHIPPED | OUTPATIENT
Start: 2021-04-25 | End: 2021-08-22

## 2021-04-25 RX ORDER — MELATONIN
2000 DAILY
Qty: 7 TABLET | Refills: 0 | Status: SHIPPED | OUTPATIENT
Start: 2021-04-25 | End: 2021-08-22

## 2021-04-25 RX ORDER — ACETAMINOPHEN 500 MG
500 TABLET ORAL EVERY 6 HOURS PRN
Qty: 30 TABLET | Refills: 0 | Status: SHIPPED | OUTPATIENT
Start: 2021-04-25 | End: 2021-08-22 | Stop reason: ALTCHOICE

## 2021-04-25 RX ORDER — ALBUTEROL SULFATE 90 UG/1
2 AEROSOL, METERED RESPIRATORY (INHALATION) EVERY 4 HOURS PRN
Qty: 1 INHALER | Refills: 0 | Status: SHIPPED | OUTPATIENT
Start: 2021-04-25 | End: 2021-07-23 | Stop reason: SDUPTHER

## 2021-04-25 RX ORDER — ZINC GLUCONATE 50 MG
50 TABLET ORAL DAILY
Qty: 7 TABLET | Refills: 0 | Status: SHIPPED | OUTPATIENT
Start: 2021-04-25 | End: 2021-08-22

## 2021-04-25 RX ORDER — GUAIFENESIN 100 MG/5ML
200 SYRUP ORAL 3 TIMES DAILY PRN
Qty: 120 ML | Refills: 0 | Status: SHIPPED | OUTPATIENT
Start: 2021-04-25 | End: 2021-05-05

## 2021-04-25 RX ORDER — IBUPROFEN 600 MG/1
600 TABLET ORAL EVERY 6 HOURS PRN
Qty: 60 TABLET | Refills: 0 | Status: SHIPPED | OUTPATIENT
Start: 2021-04-25 | End: 2021-08-22

## 2021-04-25 RX ORDER — MULTIVIT WITH MINERALS/LUTEIN
1000 TABLET ORAL DAILY
Qty: 7 TABLET | Refills: 0 | Status: SHIPPED | OUTPATIENT
Start: 2021-04-25 | End: 2021-08-22

## 2021-04-25 RX ORDER — MULTIVITAMIN
1 CAPSULE ORAL DAILY
Qty: 7 CAPSULE | Refills: 0 | Status: SHIPPED | OUTPATIENT
Start: 2021-04-25 | End: 2021-08-22

## 2021-04-25 RX ORDER — KETOROLAC TROMETHAMINE 30 MG/ML
30 INJECTION, SOLUTION INTRAMUSCULAR; INTRAVENOUS ONCE
Status: COMPLETED | OUTPATIENT
Start: 2021-04-25 | End: 2021-04-25

## 2021-04-25 RX ORDER — ONDANSETRON 4 MG/1
4 TABLET, FILM COATED ORAL EVERY 6 HOURS
Qty: 12 TABLET | Refills: 0 | Status: SHIPPED | OUTPATIENT
Start: 2021-04-25 | End: 2021-08-22

## 2021-04-25 RX ORDER — LOPERAMIDE HYDROCHLORIDE 2 MG/1
2 CAPSULE ORAL 4 TIMES DAILY PRN
Qty: 12 CAPSULE | Refills: 0 | Status: SHIPPED | OUTPATIENT
Start: 2021-04-25 | End: 2021-08-22

## 2021-04-25 RX ORDER — ACETAMINOPHEN 325 MG/1
650 TABLET ORAL ONCE
Status: COMPLETED | OUTPATIENT
Start: 2021-04-25 | End: 2021-04-25

## 2021-04-25 RX ADMIN — ACETAMINOPHEN 650 MG: 325 TABLET, FILM COATED ORAL at 21:08

## 2021-04-25 RX ADMIN — KETOROLAC TROMETHAMINE 30 MG: 30 INJECTION, SOLUTION INTRAMUSCULAR at 21:08

## 2021-04-25 NOTE — Clinical Note
Ronnell Branch was seen and treated in our emergency department on 4/25/2021  Diagnosis:     Valentin Posey    She may return on this date:     Valentin Posey was tested for COVID in the emergency department tonight  The results will not be available until 24 hours from now - she may return to work if the results are negative - if the results are positive you may not return to work for 10 days from today and must be fever free without the use of antipyretics for a 24 hour consecutive period     If you have any questions or concerns, please don't hesitate to call        Parris Ortiz MD    ______________________________           _______________          _______________  Hospital Representative                              Date                                Time

## 2021-04-26 NOTE — ED TRIAGE NOTES
Pt brought in via EMS for multiple complaints  Pt laying on side crying when RN went in to triage, was very reluctant to lay on back so this writer could get VS  Pt spent a few minutes crying prior to informing this RN why she was here  Pt with multiple sx, including SOB, diarrhea, headache, loss of taste  Pt tested for covid last week, was neg  Left room to get thermometer, went back in and pt was quiet and no longer tearful and was using her cell phone  Pt then informed this writer her boss wants her to get another covid swab   Relayed all information to MD

## 2021-04-26 NOTE — DISCHARGE INSTRUCTIONS

## 2021-04-26 NOTE — RESULT ENCOUNTER NOTE
I called Deysi Spear and let her know that her COVID-19 swab was negative  I advised her to continue social distancing procedures

## 2021-04-26 NOTE — ED PROVIDER NOTES
History  Chief Complaint   Patient presents with    Diarrhea    Shortness of Breath    Headache    Loss of Appetite     This is a 55-year-old female who presents to the emergency department via EMS  Patient states that she has had feeling of being able to take a full breath, headache loss appetite and diarrhea for the past day  Patient states she has had COVID positive contacts  She has intermittently been taking ibuprofen for her headache  She has not had ibuprofen since this morning  She also is taking her Neurontin which she has for her foot pain in that is not helping  Patient has not had COVID vaccine nor has she had COVID              Prior to Admission Medications   Prescriptions Last Dose Informant Patient Reported? Taking? ASPIRIN 81 PO   Yes No   Sig: Take 81 mg by mouth daily   Acetaminophen Extra Strength 500 MG tablet   No No   Sig: take 1 tablet by mouth every 6 hours if needed for mild pain or moderate pain for up to 14 days   Advair -21 MCG/ACT inhaler   No No   Sig: INHALE 2 PUFFS BY MOUTH TWICE DAILY   RINSE MOUTH AFTER USE   BD Pen Needle Micro U/F 32G X 6 MM MISC   No No   Sig: use 1 NEEDLE to inject MEDICATION subcutaneously twice a day   Blood Glucose Monitoring Suppl (True Metrix Air Glucose Meter) w/Device KIT   No No   Sig: use as directed   Blood Pressure Monitoring (Blood Pressure Monitor Automat) KATLYN   No No   Sig: Use Daily as Directed   Diclofenac Sodium (VOLTAREN) 1 %   No No   Sig: APPLY 2 G TOPICALLY 4 (FOUR) TIMES A DAY   Dulaglutide (Trulicity) 1 5 DI/0 5KO SOPN   No No   Sig: Tony Roberto   GlucaGen HypoKit 1 MG injection   No No   Sig: INJECT 1MG UNDER THE SKIN ONCE AS NEEDED FOR LOW BLOOD SUGAR FOR UP TO 1 DOSE   INJECT 1MG UNDER THE SKIN AS NEEDED FOR BLOOD SUGAR LESS THAN 70 FOR UP TO 1 DOSW IF UNABLE TO SWALLOW   Glucagon Emergency 1 MG injection   Yes No   Lancets (accu-chek soft touch) lancets   No No   Sig: Use as instructed   Lancets (freestyle) lancets   No No   Sig: Use as instructed, check blood sugar daily   Lantus SoloStar 100 units/mL injection pen   No No   Sig: INJECT 30 UNITS UNDER THE SKIN DAILY   QUEtiapine (SEROquel) 300 mg tablet   No No   Sig: take 1 tablet by mouth once daily   albuterol (PROVENTIL HFA,VENTOLIN HFA) 90 mcg/act inhaler   No No   Sig: Inhale 2 puffs every 6 (six) hours as needed for wheezing   atorvastatin (LIPITOR) 40 mg tablet   Yes No   chlorproMAZINE (THORAZINE) 100 mg tablet   Yes No   Sig: Take 100 mg by mouth daily at bedtime   diclofenac (VOLTAREN) 25 MG EC tablet   No No   Sig: Take 2 tablets (50 mg total) by mouth 2 (two) times a day as needed (pain)   fluconazole (DIFLUCAN) 150 mg tablet   Yes No   gabapentin (NEURONTIN) 300 mg capsule   No No   Sig: Take 2 capsules (600 mg total) by mouth 3 (three) times a day   glucose blood ([employer] Glucose Meter Test Strips) test strip   No No   Sig: Use as instructed to check blood sugar every day   glucose blood test strip   No No   Sig: Use one strip 3 times daily for blood glucose testing  glucose monitoring kit (FREESTYLE) monitoring kit   No No   Sig: Use 1 each daily Use one each daily to check blood sugars   Please refill with freestyle ed or with whichever brand is covered by insurance   hydrOXYzine HCL (ATARAX) 50 mg tablet   Yes No   Sig: Take 50 mg by mouth 3 (three) times a day   insulin lispro (HumaLOG) 100 units/mL injection  Self Yes No   Sig: Inject under the skin 2 (two) times a day   lamoTRIgine (LaMICtal) 25 mg tablet   No No   Sig: Take 1 tablet (25 mg total) by mouth daily   lisinopril (ZESTRIL) 10 mg tablet   No No   Sig: TAKE 2 TABLETS BY MOUTH DAILY   lurasidone (Latuda) 40 mg tablet   Yes No   Sig: Take 20 mg by mouth daily with breakfast   metFORMIN (GLUCOPHAGE) 1000 MG tablet   No No   Sig: TAKE ONE (1) TABLET BY MOUTH TWICE DAILY WITH FOOD   naproxen (NAPROSYN) 500 mg tablet   No No   Sig: Take 1 tablet (500 mg total) by mouth 2 (two) times a day with meals   prazosin (MINIPRESS) 1 mg capsule   Yes No   Sig: Take 1 mg by mouth daily at bedtime   traZODone (DESYREL) 100 mg tablet   No No   Sig: TAKE 2 TABLETS BY MOUTH DAILY AT BEDTIME      Facility-Administered Medications Last Administration Doses Remaining   lidocaine (LMX) 4 % cream None recorded           Past Medical History:   Diagnosis Date    Asthma     Bipolar 1 disorder (HCC)     COPD (chronic obstructive pulmonary disease) (HCC)     Depression     Diabetes mellitus (HCC)     Hypertension     Psychiatric disorder     PTSD (post-traumatic stress disorder)     Tendonitis        Past Surgical History:   Procedure Laterality Date     SECTION      EAR SURGERY         Family History   Problem Relation Age of Onset    Hypertension Mother     Diabetes Mother     HIV Mother     Heart disease Mother     No Known Problems Father      I have reviewed and agree with the history as documented  E-Cigarette/Vaping    E-Cigarette Use Never User      E-Cigarette/Vaping Substances    Nicotine No     THC No     CBD No     Flavoring No     Other No     Unknown No      Social History     Tobacco Use    Smoking status: Current Every Day Smoker     Packs/day: 0 50     Types: Cigarettes    Smokeless tobacco: Never Used   Substance Use Topics    Alcohol use: Not Currently    Drug use: Not Currently     Types: Cocaine, Marijuana     Comment: 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       Review of Systems   Constitutional: Positive for appetite change, chills and fatigue  Negative for activity change, diaphoresis, fever and unexpected weight change  HENT: Negative for congestion, ear discharge, ear pain, mouth sores, sinus pressure, sinus pain, sneezing, sore throat, trouble swallowing and voice change  Eyes: Negative for photophobia, pain, discharge, redness, itching and visual disturbance  Respiratory: Negative for cough, chest tightness and shortness of breath      Cardiovascular: Negative for chest pain, palpitations and leg swelling  Gastrointestinal: Negative for abdominal pain, constipation, nausea and vomiting  Endocrine: Negative for cold intolerance, heat intolerance, polydipsia, polyphagia and polyuria  Genitourinary: Negative for decreased urine volume, difficulty urinating, dysuria, frequency, hematuria and urgency  Musculoskeletal: Positive for myalgias  Negative for arthralgias, back pain, gait problem, joint swelling, neck pain and neck stiffness  Skin: Negative for color change and rash  Allergic/Immunologic: Negative for immunocompromised state  Neurological: Positive for weakness and headaches  Negative for dizziness, tremors, seizures, syncope, speech difficulty, light-headedness and numbness  Hematological: Does not bruise/bleed easily  Psychiatric/Behavioral: Negative for behavioral problems and suicidal ideas  Physical Exam  Physical Exam  Vitals signs and nursing note reviewed  Constitutional:       General: She is not in acute distress  Appearance: Normal appearance  She is well-developed and normal weight  She is not ill-appearing, toxic-appearing or diaphoretic  HENT:      Head: Normocephalic and atraumatic  Right Ear: Tympanic membrane, ear canal and external ear normal  There is no impacted cerumen  Left Ear: Tympanic membrane, ear canal and external ear normal  There is no impacted cerumen  Nose: Nose normal  No congestion or rhinorrhea  Mouth/Throat:      Mouth: Mucous membranes are moist       Pharynx: No oropharyngeal exudate or posterior oropharyngeal erythema  Eyes:      General: No scleral icterus  Right eye: No discharge  Left eye: No discharge  Extraocular Movements: Extraocular movements intact  Conjunctiva/sclera: Conjunctivae normal       Pupils: Pupils are equal, round, and reactive to light  Neck:      Musculoskeletal: Normal range of motion and neck supple   No neck rigidity or muscular tenderness  Thyroid: No thyromegaly  Vascular: No hepatojugular reflux or JVD  Trachea: No tracheal deviation  Cardiovascular:      Rate and Rhythm: Normal rate and regular rhythm  Pulses: Normal pulses  Carotid pulses are 2+ on the right side and 2+ on the left side  Radial pulses are 2+ on the right side and 2+ on the left side  Dorsalis pedis pulses are 2+ on the right side and 2+ on the left side  Posterior tibial pulses are 2+ on the right side and 2+ on the left side  Heart sounds: Normal heart sounds  No murmur  Pulmonary:      Effort: Pulmonary effort is normal  No accessory muscle usage or respiratory distress  Breath sounds: Normal breath sounds  No wheezing or rales  Chest:      Chest wall: No mass, deformity, tenderness, crepitus or edema  Abdominal:      General: Bowel sounds are normal  There is no distension or abdominal bruit  Palpations: Abdomen is soft  There is no hepatomegaly, splenomegaly or mass  Tenderness: There is no abdominal tenderness  There is no right CVA tenderness, left CVA tenderness, guarding or rebound  Hernia: No hernia is present  Musculoskeletal: Normal range of motion  General: No tenderness  Right lower leg: She exhibits no tenderness  No edema  Left lower leg: She exhibits no tenderness  No edema  Lymphadenopathy:      Cervical: No cervical adenopathy  Skin:     General: Skin is warm and dry  Capillary Refill: Capillary refill takes less than 2 seconds  Findings: No ecchymosis or rash  Neurological:      General: No focal deficit present  Mental Status: She is alert and oriented to person, place, and time  Cranial Nerves: No cranial nerve deficit  Sensory: No sensory deficit  Motor: No weakness or abnormal muscle tone        Deep Tendon Reflexes: Reflexes normal    Psychiatric:         Behavior: Behavior normal          Thought Content: Thought content normal          Judgment: Judgment normal       Comments: Crying         Vital Signs  ED Triage Vitals   Temperature Pulse Respirations Blood Pressure SpO2   04/25/21 2040 04/25/21 2040 04/25/21 2040 04/25/21 2040 04/25/21 2040   98 7 °F (37 1 °C) 87 22 153/91 100 %      Temp src Heart Rate Source Patient Position - Orthostatic VS BP Location FiO2 (%)   -- -- -- -- --             Pain Score       04/25/21 2108       4           Vitals:    04/25/21 2040   BP: 153/91   Pulse: 87         Visual Acuity      ED Medications  Medications   ketorolac (TORADOL) injection 30 mg (has no administration in time range)   acetaminophen (TYLENOL) tablet 650 mg (650 mg Oral Given 4/25/21 2108)       Diagnostic Studies  Results Reviewed     Procedure Component Value Units Date/Time    Novel Coronavirus (Covid-19),PCR [facility] - 24 Hour Routine [[id number]] Collected: 04/25/21 2104    Lab Status: In process Specimen: Nasopharyngeal Swab Updated: 04/25/21 2108                 No orders to display              Procedures  Procedures         ED Course                                           MDM  Number of Diagnoses or Management Options  COVID-19: new and requires workup  Diarrhea: new and does not require workup  Diagnosis management comments: This is a 64-year-old female who presents emergency department this evening by EMS  Patient has COVID symptoms  Patient states she has had positive COVID contacts  Patient has not had COVID with a COVID vaccine  She is afebrile vital signs are stable  Lungs are clear to auscultation there is good air movement throughout  Patient's headache treated with ibuprofen and Tylenol  I did order all of the COVID medications for her symptoms  Patient was swabbed for COVID  Discussed with patient that the results will not be back for at least 24 hours and that she will be called with results  She verbalized understanding    Also discussed with her that she cannot go back to work until she is either negative or 10 days after  Today if she is positive  Patient was reexamined at this time and informed of laboratory and/or imaging results and was found to be stable for discharge  Return to emergency department criteria was reviewed with the patient who verbalized understanding and was agreeable to discharge and the treatment plan at this time  Portions of the record may have been created with voice recognition software  Occasional wrong word or "sound a like" substitutions may have occurred due to the inherent limitations of voice recognition software  Read the chart carefully and recognize, using context, where substitutions have occurred  Amount and/or Complexity of Data Reviewed  Clinical lab tests: ordered    Risk of Complications, Morbidity, and/or Mortality  Presenting problems: moderate  Management options: moderate    Patient Progress  Patient progress: stable      Disposition  Final diagnoses:   COVID-19   Diarrhea     Time reflects when diagnosis was documented in both MDM as applicable and the Disposition within this note     Time User Action Codes Description Comment    4/25/2021  8:59 PM Vera Servin [U07 1] COVID-19     4/25/2021  8:59 PM Vera Servin [R19 7] Diarrhea       ED Disposition     ED Disposition Condition Date/Time Comment    Discharge Stable Sun Apr 25, 2021  8:59 PM Rafa Post discharge to home/self care              Follow-up Information    None         Patient's Medications   Discharge Prescriptions    ACETAMINOPHEN (TYLENOL) 500 MG TABLET    Take 1 tablet (500 mg total) by mouth every 6 (six) hours as needed for mild pain       Start Date: 4/25/2021 End Date: --       Order Dose: 500 mg       Quantity: 30 tablet    Refills: 0    ALBUTEROL (PROVENTIL HFA,VENTOLIN HFA) 90 MCG/ACT INHALER    Inhale 2 puffs every 4 (four) hours as needed for wheezing       Start Date: 4/25/2021 End Date: --       Order Dose: 2 puffs       Quantity: 1 Inhaler    Refills: 0    ASCORBIC ACID (VITAMIN C) 1000 MG TABLET    Take 1 tablet (1,000 mg total) by mouth daily       Start Date: 4/25/2021 End Date: --       Order Dose: 1,000 mg       Quantity: 7 tablet    Refills: 0    CHOLECALCIFEROL (VITAMIN D3) 1,000 UNITS TABLET    Take 2 tablets (2,000 Units total) by mouth daily       Start Date: 4/25/2021 End Date: --       Order Dose: 2,000 Units       Quantity: 7 tablet    Refills: 0    FLUTICASONE (FLONASE) 50 MCG/ACT NASAL SPRAY    1 spray into each nostril daily       Start Date: 4/25/2021 End Date: --       Order Dose: 1 spray       Quantity: 16 g    Refills: 0    GUAIFENESIN (ROBITUSSIN) 100 MG/5 ML SYRUP    Take 10 mL (200 mg total) by mouth 3 (three) times a day as needed for cough for up to 10 days       Start Date: 4/25/2021 End Date: 5/5/2021       Order Dose: 200 mg       Quantity: 120 mL    Refills: 0    IBUPROFEN (MOTRIN) 600 MG TABLET    Take 1 tablet (600 mg total) by mouth every 6 (six) hours as needed for mild pain       Start Date: 4/25/2021 End Date: --       Order Dose: 600 mg       Quantity: 60 tablet    Refills: 0    LOPERAMIDE (IMODIUM) 2 MG CAPSULE    Take 1 capsule (2 mg total) by mouth 4 (four) times a day as needed for diarrhea       Start Date: 4/25/2021 End Date: --       Order Dose: 2 mg       Quantity: 12 capsule    Refills: 0    MULTIPLE VITAMIN (MULTIVITAMIN) CAPSULE    Take 1 capsule by mouth daily       Start Date: 4/25/2021 End Date: --       Order Dose: 1 capsule       Quantity: 7 capsule    Refills: 0    ONDANSETRON (ZOFRAN) 4 MG TABLET    Take 1 tablet (4 mg total) by mouth every 6 (six) hours       Start Date: 4/25/2021 End Date: --       Order Dose: 4 mg       Quantity: 12 tablet    Refills: 0    ZINC GLUCONATE 50 MG TABLET    Take 1 tablet (50 mg total) by mouth daily       Start Date: 4/25/2021 End Date: --       Order Dose: 50 mg       Quantity: 7 tablet    Refills: 0         PDMP Review       Value Time User    PDMP Reviewed  Yes 4/25/2021  8:59 PM Iliana Duran MD          ED Provider  Electronically Signed by           Iliana Duran MD  04/25/21 0951

## 2021-05-12 DIAGNOSIS — M79.672 LEFT FOOT PAIN: ICD-10-CM

## 2021-05-25 ENCOUNTER — APPOINTMENT (EMERGENCY)
Dept: RADIOLOGY | Facility: HOSPITAL | Age: 36
End: 2021-05-25
Payer: MEDICARE

## 2021-05-25 ENCOUNTER — HOSPITAL ENCOUNTER (EMERGENCY)
Facility: HOSPITAL | Age: 36
Discharge: HOME | End: 2021-05-25
Attending: EMERGENCY MEDICINE
Payer: MEDICARE

## 2021-05-25 VITALS
OXYGEN SATURATION: 99 % | BODY MASS INDEX: 41.54 KG/M2 | WEIGHT: 220 LBS | TEMPERATURE: 98.3 F | SYSTOLIC BLOOD PRESSURE: 169 MMHG | HEIGHT: 61 IN | RESPIRATION RATE: 18 BRPM | HEART RATE: 84 BPM | DIASTOLIC BLOOD PRESSURE: 108 MMHG

## 2021-05-25 DIAGNOSIS — L97.509 ULCERATED, FOOT (HCC): Primary | ICD-10-CM

## 2021-05-25 LAB
ALBUMIN SERPL BCP-MCNC: 3.6 G/DL (ref 3.5–5)
ALP SERPL-CCNC: 112 U/L (ref 46–116)
ALT SERPL W P-5'-P-CCNC: 39 U/L (ref 12–78)
ANION GAP SERPL CALCULATED.3IONS-SCNC: 7 MMOL/L (ref 4–13)
AST SERPL W P-5'-P-CCNC: 19 U/L (ref 5–45)
BILIRUB SERPL-MCNC: 0.29 MG/DL (ref 0.2–1)
BUN SERPL-MCNC: 21 MG/DL (ref 5–25)
CALCIUM SERPL-MCNC: 8.9 MG/DL (ref 8.3–10.1)
CHLORIDE SERPL-SCNC: 101 MMOL/L (ref 100–108)
CO2 SERPL-SCNC: 27 MMOL/L (ref 21–32)
CREAT SERPL-MCNC: 0.79 MG/DL (ref 0.6–1.3)
ERYTHROCYTE [DISTWIDTH] IN BLOOD BY AUTOMATED COUNT: 13.5 % (ref 11.6–15.1)
GFR SERPL CREATININE-BSD FRML MDRD: 113 ML/MIN/1.73SQ M
GLUCOSE SERPL-MCNC: 288 MG/DL (ref 65–140)
HCT VFR BLD AUTO: 44.8 % (ref 34.8–46.1)
HGB BLD-MCNC: 15.8 G/DL (ref 11.5–15.4)
MCH RBC QN AUTO: 27.5 PG (ref 26.8–34.3)
MCHC RBC AUTO-ENTMCNC: 35.3 G/DL (ref 31.4–37.4)
MCV RBC AUTO: 78 FL (ref 82–98)
PLATELET # BLD AUTO: 319 THOUSANDS/UL (ref 149–390)
PMV BLD AUTO: 10.6 FL (ref 8.9–12.7)
POTASSIUM SERPL-SCNC: 4.2 MMOL/L (ref 3.5–5.3)
PROT SERPL-MCNC: 7.9 G/DL (ref 6.4–8.2)
RBC # BLD AUTO: 5.74 MILLION/UL (ref 3.81–5.12)
SODIUM SERPL-SCNC: 135 MMOL/L (ref 136–145)
WBC # BLD AUTO: 7.1 THOUSAND/UL (ref 4.31–10.16)

## 2021-05-25 PROCEDURE — 87040 BLOOD CULTURE FOR BACTERIA: CPT | Performed by: STUDENT IN AN ORGANIZED HEALTH CARE EDUCATION/TRAINING PROGRAM

## 2021-05-25 PROCEDURE — 85027 COMPLETE CBC AUTOMATED: CPT | Performed by: STUDENT IN AN ORGANIZED HEALTH CARE EDUCATION/TRAINING PROGRAM

## 2021-05-25 PROCEDURE — 99285 EMERGENCY DEPT VISIT HI MDM: CPT | Performed by: EMERGENCY MEDICINE

## 2021-05-25 PROCEDURE — 99283 EMERGENCY DEPT VISIT LOW MDM: CPT

## 2021-05-25 PROCEDURE — 80053 COMPREHEN METABOLIC PANEL: CPT | Performed by: STUDENT IN AN ORGANIZED HEALTH CARE EDUCATION/TRAINING PROGRAM

## 2021-05-25 PROCEDURE — 96375 TX/PRO/DX INJ NEW DRUG ADDON: CPT

## 2021-05-25 PROCEDURE — 73620 X-RAY EXAM OF FOOT: CPT

## 2021-05-25 PROCEDURE — 96374 THER/PROPH/DIAG INJ IV PUSH: CPT

## 2021-05-25 PROCEDURE — 36415 COLL VENOUS BLD VENIPUNCTURE: CPT | Performed by: STUDENT IN AN ORGANIZED HEALTH CARE EDUCATION/TRAINING PROGRAM

## 2021-05-25 RX ORDER — MORPHINE SULFATE 4 MG/ML
4 INJECTION, SOLUTION INTRAMUSCULAR; INTRAVENOUS ONCE
Status: COMPLETED | OUTPATIENT
Start: 2021-05-25 | End: 2021-05-25

## 2021-05-25 RX ORDER — KETOROLAC TROMETHAMINE 30 MG/ML
30 INJECTION, SOLUTION INTRAMUSCULAR; INTRAVENOUS ONCE
Status: COMPLETED | OUTPATIENT
Start: 2021-05-25 | End: 2021-05-25

## 2021-05-25 RX ORDER — OXYCODONE HYDROCHLORIDE AND ACETAMINOPHEN 5; 325 MG/1; MG/1
1 TABLET ORAL EVERY 4 HOURS PRN
Qty: 15 TABLET | Refills: 0 | Status: SHIPPED | OUTPATIENT
Start: 2021-05-25 | End: 2021-08-22

## 2021-05-25 RX ADMIN — MORPHINE SULFATE 4 MG: 4 INJECTION INTRAVENOUS at 15:26

## 2021-05-25 RX ADMIN — KETOROLAC TROMETHAMINE 30 MG: 30 INJECTION, SOLUTION INTRAMUSCULAR at 14:24

## 2021-05-25 NOTE — ED NOTES
Patient provided food and beverage  Patient requested new surgical shoe, request denied by Dr Rose Stage  Patient states she needs a ride home as well       Barb Díaz RN  05/25/21 7800

## 2021-05-25 NOTE — ED ATTENDING ATTESTATION
5/25/2021  I, Melissa Holley MD, saw and evaluated the patient  I have discussed the patient with the resident/non-physician practitioner and agree with the resident's/non-physician practitioner's findings, Plan of Care, and MDM as documented in the resident's/non-physician practitioner's note, except where noted  All available labs and Radiology studies were reviewed  I was present for key portions of any procedure(s) performed by the resident/non-physician practitioner and I was immediately available to provide assistance  At this point I agree with the current assessment done in the Emergency Department  I have conducted an independent evaluation of this patient a history and physical is as follows:    ED Course     S:  30 y o  female presents for evaluation of pain related to an ulcerative wound lateral to the base of her small toe on the left foot  This is her third ED visit in the last two months for the same  She reports that her discomfort started after a procedure done at a podiatrist's office in [place] ([facility] sp?)  She reports that the podiatrist told her she would need to see a specialist due to her diabetes history  She reports since then she has had a burning type pain that has been unresponsive to nsaids, topical creams and antibiotics  No fevers or chills  O:  Vitals:    05/25/21 1241   BP: 156/84   Pulse: 82   Resp: 18   Temp: 98 3 °F (36 8 °C)   SpO2: 99%     Physical Exam  Vitals signs and nursing note reviewed  Constitutional:       General: She is in acute distress (out of proportion to observable disease)  Appearance: She is well-developed  HENT:      Head: Normocephalic and atraumatic  Eyes:      Pupils: Pupils are equal, round, and reactive to light  Neck:      Musculoskeletal: Normal range of motion  Vascular: No JVD  Cardiovascular:      Rate and Rhythm: Normal rate and regular rhythm  Heart sounds: Normal heart sounds  No murmur  No friction rub  No gallop  Pulmonary:      Effort: Pulmonary effort is normal  No respiratory distress  Breath sounds: Normal breath sounds  No wheezing or rales  Chest:      Chest wall: No tenderness  Musculoskeletal: Normal range of motion  General: No tenderness  Skin:     General: Skin is warm and dry  Comments: Small nickel sized area of ulceration to lateral aspect of left foot at base of small toe  The actual ulcerated portion is only about 2 cm in diameter and there is indurated skin surrounding it  There is no fluctuance or significant edema  Neurological:      General: No focal deficit present  Mental Status: She is alert and oriented to person, place, and time  Psychiatric:         Behavior: Behavior normal          Thought Content:  Thought content normal          Judgment: Judgment normal        A/P:  Background: 30 y o  female presents with chronic foot wound    Differential DX includes but is not limited to: doubt osteomyelitis but possible    Plan: xray, cbc, metabolic panel, blood cultures, symptomatic treatment    Labs Reviewed   CBC AND PLATELET - Abnormal       Result Value Ref Range Status    WBC 7 10  4 31 - 10 16 Thousand/uL Final    RBC 5 74 (*) 3 81 - 5 12 Million/uL Final    Hemoglobin 15 8 (*) 11 5 - 15 4 g/dL Final    Hematocrit 44 8  34 8 - 46 1 % Final    MCV 78 (*) 82 - 98 fL Final    MCH 27 5  26 8 - 34 3 pg Final    MCHC 35 3  31 4 - 37 4 g/dL Final    RDW 13 5  11 6 - 15 1 % Final    Platelets 450  969 - 390 Thousands/uL Final    MPV 10 6  8 9 - 12 7 fL Final   COMPREHENSIVE METABOLIC PANEL - Abnormal    Sodium 135 (*) 136 - 145 mmol/L Final    Potassium 4 2  3 5 - 5 3 mmol/L Final    Chloride 101  100 - 108 mmol/L Final    CO2 27  21 - 32 mmol/L Final    ANION GAP 7  4 - 13 mmol/L Final    BUN 21  5 - 25 mg/dL Final    Creatinine 0 79  0 60 - 1 30 mg/dL Final    Comment: Standardized to IDMS reference method    Glucose 288 (*) 65 - 140 mg/dL Final    Comment: If the patient is fasting, the ADA then defines impaired fasting glucose as > 100 mg/dL and diabetes as > or equal to 123 mg/dL  Specimen collection should occur prior to Sulfasalazine administration due to the potential for falsely depressed results  Specimen collection should occur prior to Sulfapyridine administration due to the potential for falsely elevated results  Calcium 8 9  8 3 - 10 1 mg/dL Final    AST 19  5 - 45 U/L Final    Comment: Specimen collection should occur prior to Sulfasalazine administration due to the potential for falsely depressed results  ALT 39  12 - 78 U/L Final    Comment: Specimen collection should occur prior to Sulfasalazine administration due to the potential for falsely depressed results  Alkaline Phosphatase 112  46 - 116 U/L Final    Total Protein 7 9  6 4 - 8 2 g/dL Final    Albumin 3 6  3 5 - 5 0 g/dL Final    Total Bilirubin 0 29  0 20 - 1 00 mg/dL Final    Comment: Use of this assay is not recommended for patients undergoing treatment with eltrombopag due to the potential for falsely elevated results  eGFR 113  ml/min/1 73sq m Final    Narrative:     [place] guidelines for Chronic Kidney Disease (CKD):     Stage 1 with normal or high GFR (GFR > 90 mL/min/1 73 square meters)    Stage 2 Mild CKD (GFR = 60-89 mL/min/1 73 square meters)    Stage 3A Moderate CKD (GFR = 45-59 mL/min/1 73 square meters)    Stage 3B Moderate CKD (GFR = 30-44 mL/min/1 73 square meters)    Stage 4 Severe CKD (GFR = 15-29 mL/min/1 73 square meters)    Stage 5 End Stage CKD (GFR <15 mL/min/1 73 square meters)  Note: GFR calculation is accurate only with a steady state creatinine   BLOOD CULTURE   BLOOD CULTURE     XR foot 2 views LEFT   ED Interpretation   This film was interpreted independently by me  No evidence of osteomyelitis or gas forming infection             Time reflects when diagnosis was documented in both MDM as applicable and the Disposition within this note     Time User Action Codes Description Comment    5/25/2021  3:05 PM Julián Lopez Add [R07 237] Ulcerated, foot [facility])       ED Disposition     ED Disposition Condition Date/Time Comment    Discharge Stable Tue May 25, 2021  3:06 PM Thad Dickens discharge to home/self care              Follow-up Information     Follow up With Specialties Details Why Contact Info Additional Information    [facility] Schedule an appointment as soon as possible for a visit in 1 week  [address] [place] [phone]  [address] [place], [place], [address]              Critical Care Time  Procedures

## 2021-05-25 NOTE — ED NOTES
Patient on the phone talking with pharmacy while trying to obtain vital signs       James Carson RN  05/25/21 5656

## 2021-05-25 NOTE — ED PROVIDER NOTES
History  Chief Complaint   Patient presents with    Abscess     pt reports abscess on left side of foot for a few months  has been on antibiotics without being resolved  states it is starting to split open and drain     78-year-old female past medical history hypertension diabetes bipolar disorder presenting to the ED because of left lower extremity pain  Patient refers she had an ulcer of the 5th digit of left lower extremity for about 3 months which has progressively been getting worse  Reports that started noticing purulent separation 3 days ago along with increasing pain 10/10 and sensation of tightness  Patient denies fevers but does report chills  Prior to Admission Medications   Prescriptions Last Dose Informant Patient Reported? Taking? ASPIRIN 81 PO   Yes No   Sig: Take 81 mg by mouth daily   Acetaminophen Extra Strength 500 MG tablet   No No   Sig: take 1 tablet by mouth every 6 hours if needed for mild pain or moderate pain for up to 14 days   Advair -21 MCG/ACT inhaler   No No   Sig: INHALE 2 PUFFS BY MOUTH TWICE DAILY   RINSE MOUTH AFTER USE   Ascorbic Acid (vitamin C) 1000 MG tablet   No No   Sig: Take 1 tablet (1,000 mg total) by mouth daily   BD Pen Needle Micro U/F 32G X 6 MM MISC   No No   Sig: use 1 NEEDLE to inject MEDICATION subcutaneously twice a day   Blood Glucose Monitoring Suppl (True Metrix Air Glucose Meter) w/Device KIT   No No   Sig: use as directed   Blood Pressure Monitoring (Blood Pressure Monitor Automat) KATLYN   No No   Sig: Use Daily as Directed   Diclofenac Sodium (VOLTAREN) 1 %   No No   Sig: APPLY 2 G TOPICALLY 4 (FOUR) TIMES A DAY   Dulaglutide (Trulicity) 1 5 HA/2 1QG SOPN   No No   Sig: Tony Roberto   GlucaGen HypoKit 1 MG injection   No No   Sig: INJECT 1MG UNDER THE SKIN ONCE AS NEEDED FOR LOW BLOOD SUGAR FOR UP TO 1 DOSE   INJECT 1MG UNDER THE SKIN AS NEEDED FOR BLOOD SUGAR LESS THAN 70 FOR UP TO 1 DOSW IF UNABLE TO SWALLOW   Glucagon Emergency 1 MG injection   Yes No   Lancets (accu-chek soft touch) lancets   No No   Sig: Use as instructed   Lancets (freestyle) lancets   No No   Sig: Use as instructed, check blood sugar daily   Lantus SoloStar 100 units/mL injection pen   No No   Sig: INJECT 30 UNITS UNDER THE SKIN DAILY   Multiple Vitamin (multivitamin) capsule   No No   Sig: Take 1 capsule by mouth daily   QUEtiapine (SEROquel) 300 mg tablet   No No   Sig: take 1 tablet by mouth once daily   acetaminophen (TYLENOL) 500 mg tablet   No No   Sig: Take 1 tablet (500 mg total) by mouth every 6 (six) hours as needed for mild pain   albuterol (PROVENTIL HFA,VENTOLIN HFA) 90 mcg/act inhaler   No No   Sig: Inhale 2 puffs every 6 (six) hours as needed for wheezing   albuterol (PROVENTIL HFA,VENTOLIN HFA) 90 mcg/act inhaler   No No   Sig: Inhale 2 puffs every 4 (four) hours as needed for wheezing   atorvastatin (LIPITOR) 40 mg tablet   Yes No   chlorproMAZINE (THORAZINE) 100 mg tablet   Yes No   Sig: Take 100 mg by mouth daily at bedtime   cholecalciferol (VITAMIN D3) 1,000 units tablet   No No   Sig: Take 2 tablets (2,000 Units total) by mouth daily   diclofenac (VOLTAREN) 25 MG EC tablet   No No   Sig: Take 2 tablets (50 mg total) by mouth 2 (two) times a day as needed (pain)   fluconazole (DIFLUCAN) 150 mg tablet   Yes No   fluticasone (FLONASE) 50 mcg/act nasal spray   No No   Si spray into each nostril daily   gabapentin (NEURONTIN) 300 mg capsule   No No   Sig: Take 2 capsules (600 mg total) by mouth 3 (three) times a day   glucose blood ([employer] Glucose Meter Test Strips) test strip   No No   Sig: Use as instructed to check blood sugar every day   glucose blood test strip   No No   Sig: Use one strip 3 times daily for blood glucose testing  glucose monitoring kit (FREESTYLE) monitoring kit   No No   Sig: Use 1 each daily Use one each daily to check blood sugars   Please refill with freestyle ed or with whichever brand is covered by insurance hydrOXYzine HCL (ATARAX) 50 mg tablet   Yes No   Sig: Take 50 mg by mouth 3 (three) times a day   ibuprofen (MOTRIN) 600 mg tablet   No No   Sig: Take 1 tablet (600 mg total) by mouth every 6 (six) hours as needed for mild pain   insulin lispro (HumaLOG) 100 units/mL injection  Self Yes No   Sig: Inject under the skin 2 (two) times a day   lamoTRIgine (LaMICtal) 25 mg tablet   No No   Sig: Take 1 tablet (25 mg total) by mouth daily   lisinopril (ZESTRIL) 10 mg tablet   No No   Sig: TAKE 2 TABLETS BY MOUTH DAILY   loperamide (IMODIUM) 2 mg capsule   No No   Sig: Take 1 capsule (2 mg total) by mouth 4 (four) times a day as needed for diarrhea   lurasidone (Latuda) 40 mg tablet   Yes No   Sig: Take 20 mg by mouth daily with breakfast   metFORMIN (GLUCOPHAGE) 1000 MG tablet   No No   Sig: TAKE ONE (1) TABLET BY MOUTH TWICE DAILY WITH FOOD   naproxen (NAPROSYN) 500 mg tablet   No No   Sig: Take 1 tablet (500 mg total) by mouth 2 (two) times a day with meals   ondansetron (ZOFRAN) 4 mg tablet   No No   Sig: Take 1 tablet (4 mg total) by mouth every 6 (six) hours   prazosin (MINIPRESS) 1 mg capsule   Yes No   Sig: Take 1 mg by mouth daily at bedtime   traZODone (DESYREL) 100 mg tablet   No No   Sig: TAKE 2 TABLETS BY MOUTH DAILY AT BEDTIME   zinc gluconate 50 mg tablet   No No   Sig: Take 1 tablet (50 mg total) by mouth daily      Facility-Administered Medications Last Administration Doses Remaining   lidocaine (LMX) 4 % cream None recorded           Past Medical History:   Diagnosis Date    Asthma     Bipolar 1 disorder (HCC)     COPD (chronic obstructive pulmonary disease) (HCC)     Depression     Diabetes mellitus (HCC)     Hypertension     Psychiatric disorder     PTSD (post-traumatic stress disorder)     Tendonitis        Past Surgical History:   Procedure Laterality Date     SECTION      EAR SURGERY         Family History   Problem Relation Age of Onset    Hypertension Mother     Diabetes Mother     HIV Mother     Heart disease Mother     No Known Problems Father      I have reviewed and agree with the history as documented  E-Cigarette/Vaping    E-Cigarette Use Never User      E-Cigarette/Vaping Substances    Nicotine No     THC No     CBD No     Flavoring No     Other No     Unknown No      Social History     Tobacco Use    Smoking status: Current Every Day Smoker     Packs/day: 0 50     Types: Cigarettes    Smokeless tobacco: Never Used   Substance Use Topics    Alcohol use: Not Currently    Drug use: Not Currently     Types: Cocaine, Marijuana     Comment: 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        Review of Systems   Constitutional: Positive for chills  Negative for fever  HENT: Negative  Skin: Positive for wound  All other systems reviewed and are negative  Physical Exam  ED Triage Vitals   Temperature Pulse Respirations Blood Pressure SpO2   05/25/21 1241 05/25/21 1241 05/25/21 1241 05/25/21 1241 05/25/21 1241   98 3 °F (36 8 °C) 82 18 156/84 99 %      Temp Source Heart Rate Source Patient Position - Orthostatic VS BP Location FiO2 (%)   05/25/21 1241 05/25/21 1241 05/25/21 1241 05/25/21 1241 --   Oral Monitor Sitting Left arm       Pain Score       05/25/21 1421       Worst Possible Pain             Orthostatic Vital Signs  Vitals:    05/25/21 1241   BP: 156/84   Pulse: 82   Patient Position - Orthostatic VS: Sitting       Physical Exam  Constitutional:       General: She is in acute distress  Appearance: She is obese  HENT:      Head: Normocephalic  Nose: Nose normal    Eyes:      Extraocular Movements: Extraocular movements intact  Pupils: Pupils are equal, round, and reactive to light  Neck:      Musculoskeletal: Normal range of motion  Cardiovascular:      Rate and Rhythm: Normal rate and regular rhythm  Pulses: Normal pulses  Heart sounds: Normal heart sounds     Pulmonary:      Effort: Pulmonary effort is normal       Breath sounds: Normal breath sounds  Abdominal:      General: Abdomen is flat  Palpations: Abdomen is soft  Musculoskeletal: Normal range of motion  Skin:     General: Skin is warm  Capillary Refill: Capillary refill takes less than 2 seconds  Neurological:      Mental Status: She is alert and oriented to person, place, and time  Cranial Nerves: No cranial nerve deficit  Sensory: Sensory deficit present  Motor: No weakness        Comments: Sensation deficits the left lower extremity at the level of the digits   Psychiatric:      Comments: Patient is agitated and anxious         ED Medications  Medications   ketorolac (TORADOL) injection 30 mg (30 mg Intravenous Given 5/25/21 1424)   morphine (PF) 4 mg/mL injection 4 mg (4 mg Intravenous Given 5/25/21 1526)       Diagnostic Studies  Results Reviewed     Procedure Component Value Units Date/Time    Comprehensive metabolic panel [[id number]]  (Abnormal) Collected: 05/25/21 1418    Lab Status: Final result Specimen: Blood from Arm, Right Updated: 05/25/21 1454     Sodium 135 mmol/L      Potassium 4 2 mmol/L      Chloride 101 mmol/L      CO2 27 mmol/L      ANION GAP 7 mmol/L      BUN 21 mg/dL      Creatinine 0 79 mg/dL      Glucose 288 mg/dL      Calcium 8 9 mg/dL      AST 19 U/L      ALT 39 U/L      Alkaline Phosphatase 112 U/L      Total Protein 7 9 g/dL      Albumin 3 6 g/dL      Total Bilirubin 0 29 mg/dL      eGFR 113 ml/min/1 73sq m     Narrative:      [place] guidelines for Chronic Kidney Disease (CKD):     Stage 1 with normal or high GFR (GFR > 90 mL/min/1 73 square meters)    Stage 2 Mild CKD (GFR = 60-89 mL/min/1 73 square meters)    Stage 3A Moderate CKD (GFR = 45-59 mL/min/1 73 square meters)    Stage 3B Moderate CKD (GFR = 30-44 mL/min/1 73 square meters)    Stage 4 Severe CKD (GFR = 15-29 mL/min/1 73 square meters)    Stage 5 End Stage CKD (GFR <15 mL/min/1 73 square meters)  Note: GFR calculation is accurate only with a steady state creatinine    Blood culture [[id number]] Collected: 05/25/21 1432    Lab Status: In process Specimen: Blood from Arm, Left Updated: 05/25/21 1441    CBC [[id number]]  (Abnormal) Collected: 05/25/21 1418    Lab Status: Final result Specimen: Blood from Arm, Right Updated: 05/25/21 1429     WBC 7 10 Thousand/uL      RBC 5 74 Million/uL      Hemoglobin 15 8 g/dL      Hematocrit 44 8 %      MCV 78 fL      MCH 27 5 pg      MCHC 35 3 g/dL      RDW 13 5 %      Platelets 566 Thousands/uL      MPV 10 6 fL     Blood culture [[id number]] Collected: 05/25/21 1418    Lab Status: In process Specimen: Blood from Arm, Right Updated: 05/25/21 1425                 XR foot 2 views LEFT   ED Interpretation by Polo Haley MD (05/25 1435)   This film was interpreted independently by me  No evidence of osteomyelitis or gas forming infection  Procedures  Procedures      ED Course          foot x-ray shows no signs of osteomyelitis/ infection  CBC BMP within normal limits  Patient given medication for pain control  Follow-up with [address] [place] [address] podiatry                              [facility]  Number of Diagnoses or Management Options  Ulcerated, foot [facility]):   Diagnosis management comments: Patient with left lower extremity foot ulcer  No reporting purulence suppuration in increased pain  Will order foot x-ray CBC BMP blood cultures       Amount and/or Complexity of Data Reviewed  Independent visualization of images, tracings, or specimens: yes        Disposition  Final diagnoses:   Ulcerated, foot ([facility] )     Time reflects when diagnosis was documented in both MDM as applicable and the Disposition within this note     Time User Action Codes Description Comment    5/25/2021  3:05 PM Jermaine Carson Add [[id number]] Ulcerated, foot [facility])       ED Disposition     ED Disposition Condition Date/Time Comment    Discharge Stable Tue May 25, 2021  3:06 PM Abraham Tracy discharge to home/self care  Follow-up Information     Follow up With Specialties Details Why Contact Info Additional Information    [facility] Schedule an appointment as soon as possible for a visit in 1 week  [address] [address]  [address], [address] [place] [address], [address] [facility]          Patient's Medications   Discharge Prescriptions    OXYCODONE-ACETAMINOPHEN (PERCOCET) 5-325 MG PER TABLET    Take 1 tablet by mouth every 4 (four) hours as needed for moderate pain for up to 15 dosesMax Daily Amount: 6 tablets       Start Date: 5/25/2021 End Date: --       Order Dose: 1 tablet       Quantity: 15 tablet    Refills: 0     No discharge procedures on file  PDMP Review       Value Time User    PDMP Reviewed  Yes 4/25/2021  8:59 PM Aleah Marin MD           ED Provider  Attending physically available and evaluated Abraham Tracy I managed the patient along with the ED Attending      Electronically Signed by         Army Seun MD  05/25/21 8078

## 2021-05-25 NOTE — ED NOTES
Eulalia called for patient  She is waiting outside ED on bench for transport       Indy Adam RN  05/25/21 2913

## 2021-05-25 NOTE — ED NOTES
Patient to waiting room while this RN calling for maty to take patient home  Patient stated she needed to go out there because her phone was about to die and there is a  in the waiting room       Raymon Jimenez RN  05/25/21 0297

## 2021-05-30 LAB
BACTERIA BLD CULT: NORMAL
BACTERIA BLD CULT: NORMAL

## 2021-06-22 DIAGNOSIS — F31.9 BIPOLAR 1 DISORDER (HCC): ICD-10-CM

## 2021-06-22 DIAGNOSIS — E11.9 DIABETES (HCC): ICD-10-CM

## 2021-06-25 ENCOUNTER — TELEPHONE (OUTPATIENT)
Dept: FAMILY MEDICINE CLINIC | Facility: CLINIC | Age: 36
End: 2021-06-25

## 2021-06-25 RX ORDER — LAMOTRIGINE 25 MG/1
TABLET ORAL
Qty: 30 TABLET | Refills: 0 | Status: SHIPPED | OUTPATIENT
Start: 2021-06-25 | End: 2021-07-23

## 2021-06-25 NOTE — TELEPHONE ENCOUNTER
Spoke on the phone with Mike Edward  She wants to schedule visit  Please schedule back to back visits so I can see her and her wife back to back  Her wife, Caro Sunshine, 1/20/1973 - you can schedule her wife with Dr Tawny Vuong

## 2021-06-30 ENCOUNTER — HOSPITAL ENCOUNTER (OUTPATIENT)
Dept: NON INVASIVE DIAGNOSTICS | Facility: CLINIC | Age: 36
Discharge: HOME | End: 2021-06-30
Payer: MEDICARE

## 2021-06-30 DIAGNOSIS — E11.621 TYPE 2 DIABETES MELLITUS WITH FOOT ULCER (CODE) (HCC): ICD-10-CM

## 2021-06-30 PROCEDURE — 93925 LOWER EXTREMITY STUDY: CPT

## 2021-06-30 PROCEDURE — 93925 LOWER EXTREMITY STUDY: CPT | Performed by: SURGERY

## 2021-06-30 PROCEDURE — 93923 UPR/LXTR ART STDY 3+ LVLS: CPT

## 2021-06-30 PROCEDURE — 93922 UPR/L XTREMITY ART 2 LEVELS: CPT | Performed by: SURGERY

## 2021-07-05 DIAGNOSIS — M79.672 LEFT FOOT PAIN: ICD-10-CM

## 2021-07-08 ENCOUNTER — TELEPHONE (OUTPATIENT)
Dept: FAMILY MEDICINE CLINIC | Facility: CLINIC | Age: 36
End: 2021-07-08

## 2021-07-08 NOTE — TELEPHONE ENCOUNTER
Called to the patient and was able to get her an appointment Friday 7/9/21 at 1pm  Patient informed and she will call us back if she cannot get a ride to the appt

## 2021-07-09 ENCOUNTER — OFFICE VISIT (OUTPATIENT)
Dept: VASCULAR SURGERY | Facility: CLINIC | Age: 36
End: 2021-07-09
Payer: MEDICARE

## 2021-07-09 ENCOUNTER — TELEPHONE (OUTPATIENT)
Dept: VASCULAR SURGERY | Facility: CLINIC | Age: 36
End: 2021-07-09

## 2021-07-09 ENCOUNTER — TELEPHONE (OUTPATIENT)
Dept: PULMONOLOGY | Facility: CLINIC | Age: 36
End: 2021-07-09

## 2021-07-09 VITALS
HEART RATE: 78 BPM | WEIGHT: 221 LBS | DIASTOLIC BLOOD PRESSURE: 90 MMHG | BODY MASS INDEX: 41.72 KG/M2 | TEMPERATURE: 99 F | HEIGHT: 61 IN | SYSTOLIC BLOOD PRESSURE: 148 MMHG

## 2021-07-09 DIAGNOSIS — I70.245 ATHEROSCLEROSIS OF NATIVE ARTERIES OF LEFT LEG WITH ULCERATION OF OTHER PART OF FOOT (HCC): Primary | ICD-10-CM

## 2021-07-09 PROCEDURE — 99204 OFFICE O/P NEW MOD 45 MIN: CPT | Performed by: NURSE PRACTITIONER

## 2021-07-09 RX ORDER — NICOTINE 21 MG/24HR
1 PATCH, TRANSDERMAL 24 HOURS TRANSDERMAL EVERY 24 HOURS
Qty: 28 PATCH | Refills: 0 | Status: SHIPPED | OUTPATIENT
Start: 2021-07-09 | End: 2021-08-22

## 2021-07-09 NOTE — ASSESSMENT & PLAN NOTE
72-year-old female with HTN, HLD, DM, polyneuropathy, COPD, smoker, bipolar, TIA, not compliant with medications who was referred for vascular evaluation due to left 5th digit nonhealing ulceration   -LEAD 6/30/21 showed no evidence of right lower extremity occlusive disease, right NIKKI 1 14/174/109 and left proximal SFA occlusion with reconstitution of the mid SFA, left NIKKI 0 8/136/46   -X-ray 5/29/25 no osseous abnormality   -Short distance left lower extremity claudication symptoms and left lateral 5th digit ulceration x4 months and ischemic rest pain   -Reviewed study in detail with patient and recommended angiogram with left lower extremity runoff possible angioplasty and stenting  Will need anesthesia due to inability to lie flat  -She has difficulty with lying flat for exam for more than 30 seconds due to significant shortness of breath  Will have her see pulmonology prior to angiogram  Will need anesthesia consult as well   -non compliant with medications  Needs follow-up with p renu  PCP  Reinforced importance of diabetic regimen and aspirin and statin therapies     -Smoking cessation    Patch provided as this worked well for the hospital   Also given information for quit line  -Follow up after angiogram

## 2021-07-09 NOTE — TELEPHONE ENCOUNTER
This patient would be a new patient needing pre-op clearance      Surgery: Angiogram  Surgery date: TBS in 2-3 weeks  On Oxygen: No  Pulmonary Issues: COPD  Kind of sedation: Conscious   Length of surgery: 2 hours  Surgeon: ELEN doctor  Office contact: [phone]  Office note: [phone]      Schedule for 7/23

## 2021-07-09 NOTE — PROGRESS NOTES
Assessment/Plan:    Atherosclerosis 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 [facility])  61-year-old female with HTN, HLD, DM, polyneuropathy, COPD, smoker, bipolar, TIA, not compliant with medications who was referred for vascular evaluation due to left 5th digit nonhealing ulceration   -LEAD 6/30/21 showed no evidence of right lower extremity occlusive disease, right NIKKI 1 14/174/109 and left proximal SFA occlusion with reconstitution of the mid SFA, left NIKKI 0 8/136/46   -X-ray 5/29/25 no osseous abnormality   -Short distance left lower extremity claudication symptoms and left lateral 5th digit ulceration x4 months and ischemic rest pain   -Reviewed study in detail with patient and recommended angiogram with left lower extremity runoff possible angioplasty and stenting  Will need anesthesia due to inability to lie flat  -She has difficulty with lying flat for exam for more than 30 seconds due to significant shortness of breath  Will have her see pulmonology prior to angiogram  Will need anesthesia consult as well   -non compliant with medications  Needs follow-up with p m  PCP  Reinforced importance of diabetic regimen and aspirin and statin therapies     -Smoking cessation  Patch provided as this worked well for the hospital   Also given information for quit line  -Follow up after angiogram          Diagnoses and all orders for this visit:    Atherosclerosis 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 ([address] )  -     Ambulatory referral to Pulmonology; Future  -     IR lower extremity angiogram; Future  -     Basic metabolic panel; Future  -     CBC and Platelet; Future  -     Protime-INR;  Future  -     nicotine (NICODERM CQ) 21 mg/24 hr TD 24 hr patch; Place 1 patch on the skin every 24 hours          Operative Scheduling Information:    Hospital:  Any IR/endo suite    Physician:  Any other surgeon and Any IR doc    Surgery:  Angiogram with left lower extremity runoff possible angioplasty and stenting w/ general anesthesia     Urgency:  Standard    Level:  Level 2: Outpatients to be scheduled for surgery with time dependent medical necessity within 2 weeks    Case Length:  Normal    Post-op Bed:  Outpatient    OR Table:  IR    Equipment Needs:  None    Medication Instructions:  Aspirin:   Continue (do not hold)    Hydration:  No  Subjective:      Patient ID: Micheal Kaplan is a 30 y o  female  Patient is new and referred by PCP  Patient is here as an ED f/u on 4/03/21, 4/15/21, and 5/25/21 for pain on the Left foot from an ulcer  Patient is here to rev EZEKIEL done on 6/30/21  Patient has an ulcer on the 5th digit of the LLE for about 4 months  Patient does not currently see wound care but sees wound care  Patient has short distance cramping in her left hip that causes her to stop for a couple minutes before continuing  Patient is taking ASA 81mg, atorvastatin, and is a current smoker 0 5 ppd  HPI  81-year-old female with HTN, HLD, DM, polyneuropathy, COPD, smoker, bipolar, TIA, not compliant with medications who was referred for vascular evaluation due to left 5th digit nonhealing ulceration   Patient had frequent visits to the emergency department for left foot pain and ulceration  She had arterial duplex which showed proximal SFA occlusion on the left side with decreased NIKKI 0 8/136/46  She complains of left lower extremity claudication symptoms  Spontaneous wound to left lateral foot in March  She denies any trauma to the area  She has significant pain at night relieved with dependency  She has to rest 2-3 times when walking 1 block to relieve left leg claudication symptoms  She is a diabetic since age 14  She does not have a glucometer and is not checking her blood sugars  She is not taking all of her medications as prescribed  She is not taking meds that are not covered by   She is not taking insulin  Does not know last hemoglobin A1c    She was started on aspirin and atorvastatin after TIA episode  She does not always take these meds  She is smoking half pack of cigarettes a day  She had some barriers to get a nicotine patch due to cost and not covered by insurance though did note nicotine patch worked well for her in past  Denies history of COVID  Prior COVID tests reviewed in epic were negative  The following portions of the patient's history were reviewed and updated as appropriate: allergies, current medications, past family history, past medical history, past social history, past surgical history and problem list   ROS reviewed     Review of Systems   Constitutional: Negative  HENT: Negative  Eyes: Negative  Respiratory: Negative  Cardiovascular: Negative  Gastrointestinal: Negative  Endocrine: Negative  Genitourinary: Negative  Musculoskeletal: Negative  Skin: Positive for wound (5th digit LLE)  Allergic/Immunologic: Negative  Neurological: Negative  Hematological: Negative  Psychiatric/Behavioral: Negative  Objective:  I have reviewed and made appropriate changes to the review of systems input by the medical assistant      Vitals:    07/09/21 1301   BP: 148/90   BP Location: Right arm   Patient Position: Sitting   Cuff Size: Standard   Pulse: 78   Temp: 99 °F (37 2 °C)   TempSrc: Tympanic   Weight: 100 kg (221 lb)   Height: 5' 1" (1 549 m)       Patient Active Problem List   Diagnosis    Encounter for gynecological examination without abnormal finding    Possible exposure to STD    Headache    Hypertension    Type 2 diabetes mellitus with diabetic polyneuropathy, with long-term current use of insulin ([facility])    Hypertensive urgency    Paresthesia    COPD (chronic obstructive pulmonary disease) ([facility]    Nicotine dependence    Bipolar disorder ([facility])    Syncope    Cellulitis    Toe infection    Toe pain, left    Deliberate self-cutting    Left foot pain    COVID-19    Diarrhea    Atherosclerosis 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 ([address] )       Past Surgical History:   Procedure Laterality Date     SECTION      EAR SURGERY         Family History   Problem Relation Age of Onset   Davida Cosby Hypertension Mother     Diabetes Mother    Davida Cosby HIV Mother     Heart disease Mother     No Known Problems Father        Social History     Socioeconomic History    Marital status: /Civil Union     Spouse name: Not on file    Number of children: Not on file    Years of education: Not on file    Highest education level: Not on file   Occupational History    Not on file   Tobacco Use    Smoking status: Current Every Day Smoker     Packs/day: 0 50     Types: Cigarettes    Smokeless tobacco: Never Used   Vaping Use    Vaping Use: Never used   Substance and Sexual Activity    Alcohol use: Not Currently    Drug use: Not Currently     Types: Cocaine, Marijuana     Comment: 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    Sexual activity: Yes     Partners: Female, Male     Birth control/protection: None, Condom   Other Topics Concern    Not on file   Social History Narrative    Not on file     Social Determinants of Health     Financial Resource Strain:     Difficulty of Paying Living Expenses:    Food Insecurity:     Worried About Running Out of Food in the Last Year:     Ran Out of Food in the Last Year:    Transportation Needs:     Lack of Transportation (Medical):      Lack of Transportation (Non-Medical):    Physical Activity:     Days of Exercise per Week:     Minutes of Exercise per Session:    Stress:     Feeling of Stress :    Social Connections:     Frequency of Communication with Friends and Family:     Frequency of Social Gatherings with Friends and Family:     Attends [employer]oration[employer] Services:     Active Member of Clubs or Organizations:     Attends Club or Organization Meetings:     Marital Status:    Intimate Partner Violence:     Fear of Current or Ex-Partner:     Emotionally Abused:     Physically Abused:     Sexually Abused:        No Known Allergies      Current Outpatient Medications:     acetaminophen (TYLENOL) 500 mg tablet, Take 1 tablet (500 mg total) by mouth every 6 (six) hours as needed for mild pain, Disp: 30 tablet, Rfl: 0    Acetaminophen Extra Strength 500 MG tablet, take 1 tablet by mouth every 6 hours if needed for mild pain or moderate pain for up to 14 days, Disp: 56 tablet, Rfl: 0    Advair -21 MCG/ACT inhaler, INHALE 2 PUFFS BY MOUTH TWICE DAILY   RINSE MOUTH AFTER USE, Disp: 1 Inhaler, Rfl: 3    albuterol (PROVENTIL HFA,VENTOLIN HFA) 90 mcg/act inhaler, Inhale 2 puffs every 6 (six) hours as needed for wheezing, Disp: 8 5 g, Rfl: 3    albuterol (PROVENTIL HFA,VENTOLIN HFA) 90 mcg/act inhaler, Inhale 2 puffs every 4 (four) hours as needed for wheezing, Disp: 1 Inhaler, Rfl: 0    atorvastatin (LIPITOR) 40 mg tablet, , Disp: , Rfl:     BD Pen Needle Micro U/F 32G X 6 MM MISC, use 1 NEEDLE to inject MEDICATION subcutaneously twice a day, Disp: 100 each, Rfl: 0    Blood Glucose Monitoring Suppl (True Metrix Air Glucose Meter) w/Device KIT, use as directed, Disp: 1 kit, Rfl: 0    Blood Pressure Monitoring (Blood Pressure Monitor Automat) KATLYN, Use Daily as Directed, Disp: 1 Device, Rfl: 0    chlorproMAZINE (THORAZINE) 100 mg tablet, Take 100 mg by mouth daily at bedtime, Disp: , Rfl:     gabapentin (NEURONTIN) 300 mg capsule, Take 2 capsules (600 mg total) by mouth 3 (three) times a day, Disp: 180 capsule, Rfl: 0    glucose blood ([employer] Glucose Meter Test Strips) test strip, Use as instructed to check blood sugar every day, Disp: 100 each, Rfl: 3    glucose blood test strip, Use one strip 3 times daily for blood glucose testing , Disp: 100 each, Rfl: 0    glucose monitoring kit (FREESTYLE) monitoring kit, Use 1 each daily Use one each daily to check blood sugars   Please refill with freestyle ed or with whichever brand is covered by insurance, Disp: 1 each, Rfl: 0    hydrOXYzine HCL (ATARAX) 50 mg tablet, Take 50 mg by mouth 3 (three) times a day, Disp: , Rfl:     Lancets (accu-chek soft touch) lancets, Use as instructed, Disp: 100 each, Rfl: 0    Lancets (freestyle) lancets, Use as instructed, check blood sugar daily, Disp: 100 each, Rfl: 3    Lantus SoloStar 100 units/mL injection pen, INJECT 30 UNITS UNDER THE SKIN DAILY, Disp: 18 mL, Rfl: 2    lisinopril (ZESTRIL) 10 mg tablet, TAKE 2 TABLETS BY MOUTH DAILY, Disp: 180 tablet, Rfl: 0    lurasidone (Latuda) 40 mg tablet, Take 20 mg by mouth daily with breakfast, Disp: , Rfl:     metFORMIN (GLUCOPHAGE) 1000 MG tablet, TAKE ONE (1) TABLET BY MOUTH TWICE DAILY WITH FOOD, Disp: 60 tablet, Rfl: 0    prazosin (MINIPRESS) 1 mg capsule, Take 1 mg by mouth daily at bedtime, Disp: , Rfl:     QUEtiapine (SEROquel) 300 mg tablet, take 1 tablet by mouth once daily, Disp: 30 tablet, Rfl: 0    Ascorbic Acid (vitamin C) 1000 MG tablet, Take 1 tablet (1,000 mg total) by mouth daily (Patient not taking: Reported on 7/9/2021), Disp: 7 tablet, Rfl: 0    ASPIRIN 81 PO, Take 81 mg by mouth daily (Patient not taking: Reported on 7/9/2021), Disp: , Rfl:     cholecalciferol (VITAMIN D3) 1,000 units tablet, Take 2 tablets (2,000 Units total) by mouth daily (Patient not taking: Reported on 7/9/2021), Disp: 7 tablet, Rfl: 0    diclofenac (VOLTAREN) 25 MG EC tablet, Take 2 tablets (50 mg total) by mouth 2 (two) times a day as needed (pain) (Patient not taking: Reported on 7/9/2021), Disp: 14 tablet, Rfl: 0    Diclofenac Sodium (VOLTAREN) 1 %, APPLY 2 G TOPICALLY 4 (FOUR) TIMES A DAY (Patient not taking: Reported on 7/9/2021), Disp: 100 g, Rfl: 1    Dulaglutide (Trulicity) 1 5 VC/2 Mary Jo MYERS (Patient not taking: Reported on 7/9/2021), Disp: 2 mL, Rfl: 0    fluconazole (DIFLUCAN) 150 mg tablet, , Disp: , Rfl:     fluticasone (FLONASE) 50 mcg/act nasal spray, 1 spray into each nostril daily (Patient not taking: Reported on 7/9/2021), Disp: 16 g, Rfl: 0    GlucaGen HypoKit 1 MG injection, INJECT 1MG UNDER THE SKIN ONCE AS NEEDED FOR LOW BLOOD SUGAR FOR UP TO 1 DOSE   INJECT 1MG UNDER THE SKIN AS NEEDED FOR BLOOD SUGAR LESS THAN 70 FOR UP TO 1 DOSW IF UNABLE TO SWALLOW (Patient not taking: Reported on 7/9/2021), Disp: 1 each, Rfl: 0    Glucagon Emergency 1 MG injection, , Disp: , Rfl:     ibuprofen (MOTRIN) 600 mg tablet, Take 1 tablet (600 mg total) by mouth every 6 (six) hours as needed for mild pain (Patient not taking: Reported on 7/9/2021), Disp: 60 tablet, Rfl: 0    insulin lispro (HumaLOG) 100 units/mL injection, Inject under the skin 2 (two) times a day (Patient not taking: Reported on 7/9/2021), Disp: , Rfl:     lamoTRIgine (LaMICtal) 25 mg tablet, TAKE ONE (1) TABLET BY MOUTH DAILY (Patient not taking: Reported on 7/9/2021), Disp: 30 tablet, Rfl: 0    loperamide (IMODIUM) 2 mg capsule, Take 1 capsule (2 mg total) by mouth 4 (four) times a day as needed for diarrhea (Patient not taking: Reported on 7/9/2021), Disp: 12 capsule, Rfl: 0    Multiple Vitamin (multivitamin) capsule, Take 1 capsule by mouth daily (Patient not taking: Reported on 7/9/2021), Disp: 7 capsule, Rfl: 0    naproxen (NAPROSYN) 500 mg tablet, Take 1 tablet (500 mg total) by mouth 2 (two) times a day with meals (Patient not taking: Reported on 7/9/2021), Disp: 30 tablet, Rfl: 0    nicotine (NICODERM CQ) 21 mg/24 hr TD 24 hr patch, Place 1 patch on the skin every 24 hours, Disp: 28 patch, Rfl: 0    ondansetron (ZOFRAN) 4 mg tablet, Take 1 tablet (4 mg total) by mouth every 6 (six) hours (Patient not taking: Reported on 7/9/2021), Disp: 12 tablet, Rfl: 0    oxyCODONE-acetaminophen (PERCOCET) 5-325 mg per tablet, Take 1 tablet by mouth every 4 (four) hours as needed for moderate pain for up to 15 dosesMax Daily Amount: 6 tablets (Patient not taking: Reported on 7/9/2021), Disp: 15 tablet, Rfl: 0   traZODone (DESYREL) 100 mg tablet, TAKE 2 TABLETS BY MOUTH DAILY AT BEDTIME (Patient not taking: Reported on 7/9/2021), Disp: 60 tablet, Rfl: 0    zinc gluconate 50 mg tablet, Take 1 tablet (50 mg total) by mouth daily (Patient not taking: Reported on 7/9/2021), Disp: 7 tablet, Rfl: 0    Current Facility-Administered Medications:     lidocaine (LMX) 4 % cream, , Topical, Daily PRN, Carina Powers MD      /90 (BP Location: Right arm, Patient Position: Sitting, Cuff Size: Standard)   Pulse 78   Temp 99 °F (37 2 °C) (Tympanic)   Ht 5' 1" (1 549 m)   Wt 100 kg (221 lb)   BMI 41 76 kg/m²          Physical Exam  Vitals and nursing note reviewed  Constitutional:       Appearance: Normal appearance  HENT:      Head: Normocephalic and atraumatic  Eyes:      Extraocular Movements: Extraocular movements intact  Cardiovascular:      Rate and Rhythm: Normal rate  Pulses:           Dorsalis pedis pulses are 1+ on the right side and 0 on the left side  Posterior tibial pulses are 1+ on the right side and 0 on the left side  Heart sounds: Normal heart sounds  Comments: Patient not able to lay flat without shortness of breath  Unable to properly assess femoral pulses  Pulmonary:      Effort: Pulmonary effort is normal       Breath sounds: Normal breath sounds  Abdominal:      Palpations: Abdomen is soft  Musculoskeletal:         General: No swelling  Skin:     Comments: Left lateral 5th digit ischemic ulceration   Neurological:      General: No focal deficit present  Mental Status: She is alert and oriented to person, place, and time     Psychiatric:      Comments: Anxious/tearful

## 2021-07-09 NOTE — PATIENT INSTRUCTIONS
Peripheral arterial disease with non healing left 5th digit ulceration  Recommended angiogram procedure with intervention to improve perfusion and maximize healing potential   Betadine paint to left 5th toe wound  Will have you see pulmonology prior to angiogram since you will need anesthesia for angiogram to lay flat  You need to quit smoking  I sent to script for nicotine patch  You can also call the [facility] I-MD  quit Line 1800quit now  You need to follow-up with your family doctor to get your diabetes under control and get a glucometer  Start taking your aspirin and atorvastatin

## 2021-07-09 NOTE — TELEPHONE ENCOUNTER
REMINDER: Under Reason For Call, comments MUST be formatted as:   (Surgeon's Initials) / (Procedure)    Physician / Hospital: TO BE DONE BY IR  / Any Facility     Procedure: IR Lower Extremity Angiogram    Level: 3 - Route clearance(s) to [facility] Clearance Pool  (within 2 wks)    Equipment / Rep Needs: Unknown    Assistant Surgeon: No    Allergies: Patient has no known allergies  Instructions Given: Angiogram Instructions    Blood Thinners / Medication Hold: Patient is not taking any blood thinners  Hydration Required: Patient does not require hydration  Dialysis: Patient is not on dialysis  Consent: Consent will be signed day of procedure  No, I have LABELED the consent in Epic as Consent for Vascular Procedure  Clearances     Levels   1-3 ROUTE this encounter to [facility] Clearance Pool   AND   SEND Clearance Form(s) to Vascular Nursing e-mail group   Level   4 ROUTE this encounter to [facility] Surgery Coordinator Pool  AND   SEND Clearance Form(s) to Vascular Surgery Schedulers e-mail group     (1) ONE CLEARANCE NEEDED - Patient requires Pulmonology  clearance  Spoke with, Shmuel Leung, at [facility]  Patient's appointment with Nephrology has not been set yet, per pulmonology, they will call pt to set up the apt  Office contact information P: [phone] - F: [phone]    Yes, I have ROUTED this encounter to [facility] Surgery Coordinator and/or [facility] Clearance Pool

## 2021-07-23 ENCOUNTER — OFFICE VISIT (OUTPATIENT)
Dept: PULMONOLOGY | Facility: CLINIC | Age: 36
End: 2021-07-23
Payer: MEDICARE

## 2021-07-23 VITALS
DIASTOLIC BLOOD PRESSURE: 90 MMHG | OXYGEN SATURATION: 96 % | BODY MASS INDEX: 41.39 KG/M2 | TEMPERATURE: 96.8 F | HEIGHT: 61 IN | WEIGHT: 219.2 LBS | HEART RATE: 71 BPM | RESPIRATION RATE: 18 BRPM | SYSTOLIC BLOOD PRESSURE: 144 MMHG

## 2021-07-23 DIAGNOSIS — F17.200 NICOTINE DEPENDENCE, UNCOMPLICATED, UNSPECIFIED NICOTINE PRODUCT TYPE: ICD-10-CM

## 2021-07-23 DIAGNOSIS — J44.9 CHRONIC OBSTRUCTIVE PULMONARY DISEASE, UNSPECIFIED COPD TYPE (HCC): Primary | ICD-10-CM

## 2021-07-23 DIAGNOSIS — E11.65 UNCONTROLLED TYPE 2 DIABETES MELLITUS WITH HYPERGLYCEMIA (HCC): ICD-10-CM

## 2021-07-23 DIAGNOSIS — U07.1 COVID-19: ICD-10-CM

## 2021-07-23 DIAGNOSIS — G47.33 OBSTRUCTIVE SLEEP APNEA SYNDROME: ICD-10-CM

## 2021-07-23 PROCEDURE — 99205 OFFICE O/P NEW HI 60 MIN: CPT | Performed by: INTERNAL MEDICINE

## 2021-07-23 PROCEDURE — 94010 BREATHING CAPACITY TEST: CPT | Performed by: INTERNAL MEDICINE

## 2021-07-23 RX ORDER — HYDROXYZINE PAMOATE 50 MG/1
CAPSULE ORAL
COMMUNITY
Start: 2021-07-19 | End: 2021-08-22

## 2021-07-23 RX ORDER — ALBUTEROL SULFATE 2.5 MG/3ML
2.5 SOLUTION RESPIRATORY (INHALATION) EVERY 6 HOURS PRN
Qty: 180 ML | Refills: 2 | Status: SHIPPED | OUTPATIENT
Start: 2021-07-23 | End: 2021-08-09

## 2021-07-23 RX ORDER — ALBUTEROL SULFATE 90 UG/1
2 AEROSOL, METERED RESPIRATORY (INHALATION) 4 TIMES DAILY
Qty: 18 G | Refills: 3 | Status: SHIPPED | OUTPATIENT
Start: 2021-07-23 | End: 2021-08-22 | Stop reason: ALTCHOICE

## 2021-07-23 NOTE — ASSESSMENT & PLAN NOTE
We talked today about different strategies for tobacco cessation  She is working on cutting down the amount of cigarettes she smokes a day and is interested in nicotine replacement

## 2021-07-23 NOTE — TELEPHONE ENCOUNTER
Patient requesting refills on the attached medication  Patient also does not have a glucometer  Please advise  Thank you

## 2021-07-23 NOTE — ASSESSMENT & PLAN NOTE
I did confirm in the office with spirometry that Patel Smith has mild COPD  Given this is likely asthma overlap and she is doing well I continue her on Advair 1 puff twice a day  She will use her rescue inhaler as needed  She is too young for lung cancer screening CT  We talked today about tobacco cessation and she will come  samples of nicotine gum later today  She is working with her insurance to trying get tobacco/replacement products approved  We talked about exacerbation and I asked her to give me a call if she has any change in her breathing wheeze cough or shortness of breath  She is here today for preoperative evaluation for upcoming surgery  Given her age oxygenation length of surgery and type of surgery I would consider her a low risk for perioperative complications from pulmonary standpoint  I would clear her for her upcoming surgery

## 2021-07-23 NOTE — PROGRESS NOTES
Assessment/Plan:    COPD (chronic obstructive pulmonary disease) ([facility] )   I did confirm in the office with spirometry that Alecia Brown has mild COPD  Given this is likely asthma overlap and she is doing well I continue her on Advair 1 puff twice a day  She will use her rescue inhaler as needed  She is too young for lung cancer screening CT  We talked today about tobacco cessation and she will come  samples of nicotine gum later today  She is working with her insurance to trying get tobacco/replacement products approved  We talked about exacerbation and I asked her to give me a call if she has any change in her breathing wheeze cough or shortness of breath  She is here today for preoperative evaluation for upcoming surgery  Given her age oxygenation length of surgery and type of surgery I would consider her a low risk for perioperative complications from pulmonary standpoint  I would clear her for her upcoming surgery  Hemant Cowan recovered from her recent infection with COVID in April  Nicotine dependence    We talked today about different strategies for tobacco cessation  She is working on cutting down the amount of cigarettes she smokes a day and is interested in nicotine replacement  Diagnoses and all orders for this visit:    Chronic obstructive pulmonary disease, unspecified COPD type ([facility] )  -     POCT spirometry  -     POCT spirometry    COVID-19    Nicotine dependence, uncomplicated, unspecified nicotine product type    Other orders  -     hydrOXYzine pamoate (VISTARIL) 50 mg capsule; TAKE 1 CAPSULE (50 MG) BY ORAL ROUTE 3 TIMES PER DAY          Subjective:      Patient ID: Juancarlos Guerra is a 30 y o  female  Do now here for preoperative clearance for upcoming vascular surgery  She has had asthma since she was a teenager but has progressed to COPD per her records    She is hospitalized 3 or 4 times in the emergency department is been steroids 1 or 2 times in her life   For the most part she maintains on Advair b i d  and uses her albuterol 3 to 4 times a day  She denies any chronic cough or frequent bronchitis  She is actually able to walk up 5 flights of stairs if she lives on the 5th floor walk-up  She denies any limitations with regards to her breathing  She does have black mold in her hotel room which is affecting her breathing  She denies any pets  She has a 2 pack-per-day smoker for 15 years has weaned down to half a pack per day  She smokes crack cocaine but quit 7 years ago  The following portions of the patient's history were reviewed and updated as appropriate: allergies, current medications, past family history, past medical history, past social history, past surgical history and problem list     Review of Systems   Constitutional: Negative  Negative for unexpected weight change  HENT: Negative  Negative for postnasal drip  Eyes: Negative  Respiratory: Negative  Negative for cough, shortness of breath and wheezing  Cardiovascular: Negative  Negative for chest pain and leg swelling  Gastrointestinal: Negative  Endocrine: Negative  Genitourinary: Negative  Musculoskeletal: Negative  Allergic/Immunologic: Negative  Neurological: Negative  Hematological: Negative  Objective:      /90 (BP Location: Right arm, Patient Position: Sitting, Cuff Size: Adult)   Pulse 71   Temp (!) 96 8 °F (36 °C) (Tympanic)   Resp 18   Ht 5' 1" (1 549 m)   Wt 99 4 kg (219 lb 3 2 oz)   SpO2 96%   BMI 41 42 kg/m²          Physical Exam  Constitutional:       Appearance: She is well-developed  HENT:      Head: Normocephalic  Eyes:      Pupils: Pupils are equal, round, and reactive to light  Cardiovascular:      Rate and Rhythm: Normal rate  Heart sounds: No murmur heard  Pulmonary:      Effort: Pulmonary effort is normal  No respiratory distress  Breath sounds: Normal breath sounds  No wheezing or rales  Abdominal:      Palpations: Abdomen is soft  Musculoskeletal:         General: Normal range of motion  Cervical back: Normal range of motion and neck supple  Skin:     General: Skin is warm and dry  Neurological:      Mental Status: She is alert and oriented to person, place, and time

## 2021-07-23 NOTE — TELEPHONE ENCOUNTER
Patient called back and found her glucometer  She has true metrix and will need test strips and lancets for it  Please advise  Thank you

## 2021-07-26 DIAGNOSIS — E11.65 UNCONTROLLED TYPE 2 DIABETES MELLITUS WITH HYPERGLYCEMIA (HCC): ICD-10-CM

## 2021-07-26 RX ORDER — DULAGLUTIDE 1.5 MG/.5ML
INJECTION, SOLUTION SUBCUTANEOUS
Qty: 2 ML | Refills: 0 | Status: SHIPPED | OUTPATIENT
Start: 2021-07-26 | End: 2021-09-30 | Stop reason: SDUPTHER

## 2021-07-26 RX ORDER — INSULIN GLARGINE 100 [IU]/ML
30 INJECTION, SOLUTION SUBCUTANEOUS DAILY
Qty: 18 ML | Refills: 2 | Status: ON HOLD | OUTPATIENT
Start: 2021-07-26 | End: 2021-08-27 | Stop reason: SDUPTHER

## 2021-07-26 RX ORDER — ALBUTEROL SULFATE 90 UG/1
2 AEROSOL, METERED RESPIRATORY (INHALATION) EVERY 4 HOURS PRN
Qty: 18 G | Refills: 0 | Status: SHIPPED | OUTPATIENT
Start: 2021-07-26 | End: 2021-08-22 | Stop reason: ALTCHOICE

## 2021-07-26 NOTE — H&P (VIEW-ONLY)
Patient has glucometer true metrix and will need test strips and lancets for it   Reordered per patient request

## 2021-07-27 DIAGNOSIS — I70.245 ATHEROSCLEROSIS OF NATIVE ARTERIES OF LEFT LEG WITH ULCERATION OF OTHER PART OF FOOT (HCC): Primary | ICD-10-CM

## 2021-07-27 NOTE — TELEPHONE ENCOUNTER
From: Bernardino Daugherty <[email or web address]   Sent: Tuesday, July 27, 2021 10:35 AM  To: Rosemary Tena <[email or web address]; IR [email or web address] [email or web address]  Cc: Vascular Surgery Schedulers [email or web address]  Subject: RE: Eloisa for IR doc    Hi again Jewelene Gosselin, could an IR referral also be placed? Liseth Yancey did call back and is scheduled for 8/11 but we would just need the referral for our doctors to review  Thank you!

## 2021-07-27 NOTE — TELEPHONE ENCOUNTER
Letter taken back from mail and not sent  Is patient requesting a call when authorization has been obtained? Patient did not request a call  Surgery Date: 8/11/21  Primary Surgeon: TO BE DONE BY IR   Assisting Surgeon: Not Applicable (N/A)  Facility: [facility] (Tax: [id number] / NPI: [id number])  Inpatient / Outpatient: Outpatient  Level: 2    Clearance Received: No clearance ordered  Consent Received: Consent will be signed day of procedure  Medication Hold / Last Dose: Not Applicable (N/A)  VQI Spreadsheet: Not Applicable (N/A)  IR Notified: 7/23/21  Rep  Notified: Not Applicable (N/A)  Equipment Needs: Not Applicable (N/A)  Vas Lab Requested: Not Applicable (N/A)  Patient Contacted: 7/27/21    Diagnosis: I70 245  Procedure/ CPT Code(s): Angiogram // CPT: 17558, 98243, 34718 and 72698    For varicose vein related procedures, last LEVDR?  Not Applicable (N/A)    Post Operative Date/ Time: To Be Determined (TBD)

## 2021-07-27 NOTE — TELEPHONE ENCOUNTER
From: Jez Jeff <[email or web address]   Sent: Tuesday, July 27, 2021 9:57 AM  To: Uyen Rocha <[email or web address]; [email or web address]  Cc: Vascular Surgery Schedulers [email or web address]  Subject: RE: Eloisa for IR doc    Valerio Will left a few messages for Tony but havent heard back from her about scheduling  We provided our number to call back to schedule, but maybe someone from the office could try reaching out as well? Our work flow now is to just do 3 calls and then follow up with the referring doctor if we dont hear back from the patient  Thank you!

## 2021-07-29 ENCOUNTER — TELEPHONE (OUTPATIENT)
Dept: SLEEP CENTER | Facility: CLINIC | Age: 36
End: 2021-07-29

## 2021-07-29 NOTE — TELEPHONE ENCOUNTER
----- Message from Jaclyn Garza MD sent at 7/29/2021 12:21 PM EDT -----  Approved  ----- Message -----  From: Patricia David  Sent: 1/56/9098   9:11 AM EDT  To: Sleep Medicine [facility] Provider    This sleep study needs approval      If approved please sign and return to clerical pool  If denied please include reasons why  Also provide alternative testing if warranted  Please sign and return to clerical pool

## 2021-07-30 NOTE — TELEPHONE ENCOUNTER
Authorization requirements reviewed  Please refer to Sara Corrigan / Chad Dyer number [id number] for case updates

## 2021-08-05 ENCOUNTER — TELEPHONE (OUTPATIENT)
Dept: RADIOLOGY | Facility: HOSPITAL | Age: 36
End: 2021-08-05

## 2021-08-05 DIAGNOSIS — J44.9 CHRONIC OBSTRUCTIVE PULMONARY DISEASE, UNSPECIFIED COPD TYPE (HCC): ICD-10-CM

## 2021-08-05 RX ORDER — SODIUM CHLORIDE 9 MG/ML
75 INJECTION, SOLUTION INTRAVENOUS CONTINUOUS
Status: CANCELLED | OUTPATIENT
Start: 2021-08-05

## 2021-08-05 NOTE — PRE-PROCEDURE INSTRUCTIONS
Pre-procedure Instructions for Interventional Radiology  Alfredo Ridley  [facility] [address] [phone]    You are scheduled for a/an left leg arteriogram     On Wednesday 8/11/21  Your tentative arrival time is 0745  Short stay will notify you the day before your procedure with the exact arrival time and the location to arrive  To prepare for your procedure:  1  Please arrange for someone to drive you home after the procedure and stay with you until the next morning if you are instructed to do so  This is typically for patients receiving some type of sedative or anesthetic for the procedure  2  DO NOT EAT OR DRINK ANYTHING after midnight on the evening before your procedure including candy & gum   3  ONLY SIPS OF WATER with your medications are allowed on the morning of your procedure  4  TAKE ALL OF YOUR REGULAR MEDICATIONS THE MORNING OF YOUR PROCEDURE with sips of water! We may call you to stop some of your blood sugar, blood pressure and blood thinning medications depending on the procedure  Please take all of these medications unless we instruct you to stop them  5  If you have an allergy to x-ray dye, please contact Interventional Radiology for an x-ray dye preparation which usually consists of an oral steroid and Benadryl  The day of your procedure:  1  Bring a list of the medications you take at home  2  Bring medications you take for breathing problems (such as inhalers), medications for chest pain, or both  3  Bring a case for your glasses or contacts  4  Bring your insurance card and a form of photo ID   5  Please leave all valuables such as credit cards and jewelry at home  6  Report to the registration desk in the main lobby at the [facility]  Ask to be directed to [facility]    7  While your procedure is being performed, your family may wait in the Radiology Waiting Room on the 1st floor in Radiology  if they need to leave, they may provide a number to be called following the procedure  8  Be prepared to stay overnight just in case  Sometimes procedures will indicate the need for further observation or treatment  9  If you are scheduled for a follow-up visit with the Interventional Radiologist after your procedure, you will be called with a date and time  10  Covid vaccine completed June 2021  Special Instructions (Medications to stop taking before your procedure etc ):    Holding Metformin 8/11/21 and 8/12/21

## 2021-08-06 DIAGNOSIS — I10 HYPERTENSION: ICD-10-CM

## 2021-08-06 DIAGNOSIS — E11.9 DIABETES (HCC): ICD-10-CM

## 2021-08-06 DIAGNOSIS — F31.9 BIPOLAR 1 DISORDER (HCC): ICD-10-CM

## 2021-08-06 RX ORDER — LISINOPRIL 10 MG/1
TABLET ORAL
Qty: 180 TABLET | Refills: 0 | Status: SHIPPED | OUTPATIENT
Start: 2021-08-06 | End: 2021-11-03

## 2021-08-06 RX ORDER — LAMOTRIGINE 25 MG/1
TABLET ORAL
Qty: 30 TABLET | Refills: 0 | Status: SHIPPED | OUTPATIENT
Start: 2021-08-06 | End: 2021-09-30 | Stop reason: ALTCHOICE

## 2021-08-09 RX ORDER — ALBUTEROL SULFATE 2.5 MG/3ML
SOLUTION RESPIRATORY (INHALATION)
Qty: 180 ML | Refills: 2 | Status: SHIPPED | OUTPATIENT
Start: 2021-08-09 | End: 2021-08-22

## 2021-08-11 ENCOUNTER — ANESTHESIA EVENT (OUTPATIENT)
Dept: RADIOLOGY | Facility: HOSPITAL | Age: 36
End: 2021-08-11
Payer: MEDICARE

## 2021-08-11 ENCOUNTER — ANESTHESIA (OUTPATIENT)
Dept: RADIOLOGY | Facility: HOSPITAL | Age: 36
End: 2021-08-11
Payer: MEDICARE

## 2021-08-11 ENCOUNTER — HOSPITAL ENCOUNTER (OUTPATIENT)
Dept: RADIOLOGY | Facility: HOSPITAL | Age: 36
Discharge: HOME | End: 2021-08-11
Attending: RADIOLOGY | Admitting: RADIOLOGY
Payer: MEDICARE

## 2021-08-11 VITALS
DIASTOLIC BLOOD PRESSURE: 103 MMHG | TEMPERATURE: 98.3 F | SYSTOLIC BLOOD PRESSURE: 150 MMHG | OXYGEN SATURATION: 98 % | WEIGHT: 219.8 LBS | HEIGHT: 61 IN | HEART RATE: 82 BPM | RESPIRATION RATE: 18 BRPM | BODY MASS INDEX: 41.5 KG/M2

## 2021-08-11 DIAGNOSIS — I70.245 ATHEROSCLEROSIS OF NATIVE ARTERIES OF LEFT LEG WITH ULCERATION OF OTHER PART OF FOOT (HCC): ICD-10-CM

## 2021-08-11 LAB
INR PPP: 0.96 (ref 0.84–1.19)
PROTHROMBIN TIME: 12.8 SECONDS (ref 11.6–14.5)

## 2021-08-11 PROCEDURE — C1887 CATHETER, GUIDING: HCPCS

## 2021-08-11 PROCEDURE — C1760 CLOSURE DEV, VASC: HCPCS

## 2021-08-11 PROCEDURE — G9198 NO ORDER FOR CEPH NO REASON: HCPCS | Performed by: RADIOLOGY

## 2021-08-11 PROCEDURE — 85610 PROTHROMBIN TIME: CPT | Performed by: RADIOLOGY

## 2021-08-11 PROCEDURE — 37226 HB FEM/POPL REVASC W/STENT: CPT

## 2021-08-11 PROCEDURE — 76937 US GUIDE VASCULAR ACCESS: CPT

## 2021-08-11 PROCEDURE — 37226 PR REVASCULARIZE FEM/POP ARTERY,ANGIOPLASTY/STENT: CPT | Performed by: RADIOLOGY

## 2021-08-11 PROCEDURE — C1874 STENT, COATED/COV W/DEL SYS: HCPCS

## 2021-08-11 PROCEDURE — C1769 GUIDE WIRE: HCPCS

## 2021-08-11 PROCEDURE — C1725 CATH, TRANSLUMIN NON-LASER: HCPCS

## 2021-08-11 PROCEDURE — 75625 CONTRAST EXAM ABDOMINL AORTA: CPT

## 2021-08-11 PROCEDURE — C1894 INTRO/SHEATH, NON-LASER: HCPCS

## 2021-08-11 PROCEDURE — 76937 US GUIDE VASCULAR ACCESS: CPT | Performed by: RADIOLOGY

## 2021-08-11 PROCEDURE — 75710 ARTERY X-RAYS ARM/LEG: CPT | Performed by: RADIOLOGY

## 2021-08-11 RX ORDER — HEPARIN SODIUM 1000 [USP'U]/ML
INJECTION, SOLUTION INTRAVENOUS; SUBCUTANEOUS AS NEEDED
Status: DISCONTINUED | OUTPATIENT
Start: 2021-08-11 | End: 2021-08-11

## 2021-08-11 RX ORDER — ACETAMINOPHEN 325 MG/1
650 TABLET ORAL ONCE
Status: COMPLETED | OUTPATIENT
Start: 2021-08-11 | End: 2021-08-11

## 2021-08-11 RX ORDER — LIDOCAINE HYDROCHLORIDE 10 MG/ML
INJECTION, SOLUTION EPIDURAL; INFILTRATION; INTRACAUDAL; PERINEURAL AS NEEDED
Status: DISCONTINUED | OUTPATIENT
Start: 2021-08-11 | End: 2021-08-11

## 2021-08-11 RX ORDER — CLOPIDOGREL BISULFATE 75 MG/1
75 TABLET ORAL DAILY
Qty: 30 TABLET | Refills: 2 | Status: SHIPPED | OUTPATIENT
Start: 2021-08-11 | End: 2021-09-17 | Stop reason: HOSPADM

## 2021-08-11 RX ORDER — SODIUM CHLORIDE 9 MG/ML
75 INJECTION, SOLUTION INTRAVENOUS CONTINUOUS
Status: DISCONTINUED | OUTPATIENT
Start: 2021-08-11 | End: 2021-08-11 | Stop reason: HOSPADM

## 2021-08-11 RX ORDER — MIDAZOLAM HYDROCHLORIDE 2 MG/2ML
INJECTION, SOLUTION INTRAMUSCULAR; INTRAVENOUS AS NEEDED
Status: DISCONTINUED | OUTPATIENT
Start: 2021-08-11 | End: 2021-08-11

## 2021-08-11 RX ORDER — GLYCOPYRROLATE 0.2 MG/ML
INJECTION INTRAMUSCULAR; INTRAVENOUS AS NEEDED
Status: DISCONTINUED | OUTPATIENT
Start: 2021-08-11 | End: 2021-08-11

## 2021-08-11 RX ORDER — HYDRALAZINE HYDROCHLORIDE 10 MG/1
10 TABLET, FILM COATED ORAL ONCE
Status: DISCONTINUED | OUTPATIENT
Start: 2021-08-11 | End: 2021-08-11 | Stop reason: HOSPADM

## 2021-08-11 RX ORDER — FENTANYL CITRATE 50 UG/ML
INJECTION, SOLUTION INTRAMUSCULAR; INTRAVENOUS AS NEEDED
Status: DISCONTINUED | OUTPATIENT
Start: 2021-08-11 | End: 2021-08-11

## 2021-08-11 RX ORDER — PROPOFOL 10 MG/ML
INJECTION, EMULSION INTRAVENOUS CONTINUOUS PRN
Status: DISCONTINUED | OUTPATIENT
Start: 2021-08-11 | End: 2021-08-11

## 2021-08-11 RX ORDER — CLOPIDOGREL BISULFATE 75 MG/1
300 TABLET ORAL ONCE
Status: COMPLETED | OUTPATIENT
Start: 2021-08-11 | End: 2021-08-11

## 2021-08-11 RX ADMIN — PROPOFOL 130 MCG/KG/MIN: 10 INJECTION, EMULSION INTRAVENOUS at 09:37

## 2021-08-11 RX ADMIN — HEPARIN SODIUM 1000 UNITS: 1000 INJECTION INTRAVENOUS; SUBCUTANEOUS at 11:15

## 2021-08-11 RX ADMIN — LIDOCAINE HYDROCHLORIDE 50 MG: 10 INJECTION, SOLUTION EPIDURAL; INFILTRATION; INTRACAUDAL; PERINEURAL at 09:37

## 2021-08-11 RX ADMIN — ACETAMINOPHEN 650 MG: 325 TABLET, FILM COATED ORAL at 15:06

## 2021-08-11 RX ADMIN — FENTANYL CITRATE 50 MCG: 50 INJECTION INTRAMUSCULAR; INTRAVENOUS at 11:31

## 2021-08-11 RX ADMIN — SODIUM CHLORIDE: 0.9 INJECTION, SOLUTION INTRAVENOUS at 09:04

## 2021-08-11 RX ADMIN — GLYCOPYRROLATE 0.2 MG: 0.2 INJECTION, SOLUTION INTRAMUSCULAR; INTRAVENOUS at 11:35

## 2021-08-11 RX ADMIN — HEPARIN SODIUM 1000 UNITS: 1000 INJECTION INTRAVENOUS; SUBCUTANEOUS at 10:53

## 2021-08-11 RX ADMIN — HEPARIN SODIUM 1000 UNITS: 1000 INJECTION INTRAVENOUS; SUBCUTANEOUS at 11:22

## 2021-08-11 RX ADMIN — CLOPIDOGREL BISULFATE 300 MG: 75 TABLET ORAL at 15:06

## 2021-08-11 RX ADMIN — IODIXANOL 105 ML: 320 INJECTION, SOLUTION INTRAVASCULAR at 12:14

## 2021-08-11 RX ADMIN — HEPARIN SODIUM 5000 UNITS: 1000 INJECTION INTRAVENOUS; SUBCUTANEOUS at 10:17

## 2021-08-11 RX ADMIN — MIDAZOLAM 2 MG: 1 INJECTION INTRAMUSCULAR; INTRAVENOUS at 09:38

## 2021-08-11 NOTE — BRIEF OP NOTE (RAD/CATH)
INTERVENTIONAL RADIOLOGY PROCEDURE NOTE    Date: 8/11/2021    Procedure: IR LOWER EXTREMITY ANGIOGRAM    Preoperative diagnosis:   1  Atherosclerosis 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 ([facility] [address] )         Postoperative diagnosis: Same  Surgeon: Faustina Freed MD     Assistant: None  No qualified resident was available  Blood loss: Minimal    Specimens: None     Findings: Long segment SFA occlusion  Lesion crossed and treated with 2, 120 x 6 mm Viola stents  Moderate disease in the mid/distal peroneal artery  The patient was coughing and having difficulty holding still so the decision to not treat this area at this time was made  Complications: None immediate  Anesthesia: conscious sedation     Vascular Quality Initiative - Peripheral Vascular Intervention     Urgency: Elective    Functional Status:  Fully active; able to carry on all predisease activities without restriction  Ambulation: Amb = independently ambulatory    Leg Symptoms    Left: Ulcer/necrosis (gangrene): de meliton tissue loss due to peripheral arterial disease, not due to non-healing prior amputation       Tissue Loss Severity: Grade 1, Shallow = small shallow ulcer(s) on distal leg or foot, any exposed bone is only limited to distal phalanx (ie, minor tissue loss: limb salvage possible with simple digital amputation [1 or 2 digits], or skin coverage)  Infection: Grade 0, None = No symptoms or signs of infection  Right: Asymptomatic:  documented peripheral arterial disease without symptoms of claudication or ischemic pain      Treatment of Native Artery to Maintain Bypass Patency?:  No    COVID Information  COVID Symptoms Pre-Procedure: Asymptomatic    Treatment Delayed by Pandemic: None    Access   Number of Sites: 1     Access Site 1:     Side 1: Right    Site 1: Femoral Retrograde    Access Guidance 1:U/S    Largest Sheath Size 1: 6 Fr      Closure Device 1: MynxGrip      Number of Closure Devices: 1     Closure Device Outcome: Closure device successful         Procedure  Fluoro Time: 27 9 minutes  Contrast Volume: Visipaque 105 ml  CO2: no  Anticoagulant: Heparin  Protamine: No  If Creatinine is > 1 2 or missing, ASHLEY Prophylaxis none     Treatment Details  Indication: Occlusive Disease,    Completion Assessment  Artery 1 treated: SFA        Left               Outflow: AT,PT,Peroneal: 2                  Was this Site previously treated?: No          TASC Grade: C          Total Treated Length: 20 5 cm          Total Occluded Length: 6 cm          Calcification: None (no calcification visible on fluoroscopic, CT or IVUS imaging)          Number of Treatment types (Devices):    2           Device 1          Treatment Type: Plain Balloon         Device 2          Treatment Type: Stent,  Drug Eluting Stent                Diameter: 6 mm          Length: 120 mm x 2            Concomitant: None          Technical result: Successful (stenosis <=30%)      None     Post Procedure  Procedure Complications: No

## 2021-08-11 NOTE — INTERVAL H&P NOTE
Update: (This section must be completed if the H&P was completed greater than 24 hrs to procedure or admission)    H&P reviewed  After examining the patient, I find no changed to the H&P since it had been written  Patient re-evaluated   Accept as history and physical     Julio Keane MD/August 11, 2021/9:50 AM

## 2021-08-11 NOTE — SEDATION DOCUMENTATION
Lower left leg angiogram with intervention completed by Dr Xander Ribera without complications   Pt care managed by anesthesia, pt is for a 4 hour bedrest starting at 1145

## 2021-08-11 NOTE — ANESTHESIA PREPROCEDURE EVALUATION
Procedure:  IR LOWER EXTREMITY ANGIOGRAM    Relevant Problems   CARDIO   (+) Atherosclerosis 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 ([facility])   (+) Hypertension   (+) Hypertensive urgency      ENDO   (+) Type 2 diabetes mellitus with diabetic polyneuropathy, with long-term current use of insulin ([facility])      NEURO/PSYCH   (+) Headache   (+) Paresthesia      PULMONARY   (+) COPD (chronic obstructive pulmonary disease) ([facility])      Other   (+) Bipolar disorder ([facility])   (+) Body mass index (BMI)40 0-44 9, adult ([facility])   (+) Cellulitis   (+) Deliberate self-cutting   (+) Left foot pain   (+) Nicotine dependence   (+) Possible exposure to STD   (+) Syncope   (+) Toe infection   (+) Toe pain, left      Lab Results   Component Value Date    SODIUM 135 (L) 05/25/2021    K 4 2 05/25/2021     05/25/2021    CO2 27 05/25/2021    AGAP 7 05/25/2021    BUN 21 05/25/2021    CREATININE 0 79 05/25/2021    GLUC 288 (H) 05/25/2021    CALCIUM 8 9 05/25/2021    AST 19 05/25/2021    ALT 39 05/25/2021    ALKPHOS 112 05/25/2021    TP 7 9 05/25/2021    TBILI 0 29 05/25/2021    EGFR 113 05/25/2021     Lab Results   Component Value Date    WBC 7 10 05/25/2021    HGB 15 8 (H) 05/25/2021    HCT 44 8 05/25/2021    MCV 78 (L) 05/25/2021     05/25/2021       Physical Exam    Airway    Mallampati score: III  TM Distance: <3 FB  Neck ROM: full     Dental       Cardiovascular      Pulmonary      Other Findings        Anesthesia Plan  ASA Score- 3     Anesthesia Type- IV sedation with anesthesia with ASA Monitors  Additional Monitors:   Airway Plan:           Plan Factors-Exercise tolerance (METS): >4 METS  Chart reviewed  Existing labs reviewed  Patient is a current smoker  Induction- intravenous  Postoperative Plan-     Informed Consent- Anesthetic plan and risks discussed with patient  I personally reviewed this patient with the CRNA   Discussed and agreed on the Anesthesia Plan with the KARTIK Torres

## 2021-08-11 NOTE — ANESTHESIA POSTPROCEDURE EVALUATION
Post-Op Assessment Note    CV Status:  Stable  Pain Score: 0    Pain management: adequate     Mental Status:  Alert and awake   Hydration Status:  Euvolemic   PONV Controlled:  Controlled   Airway Patency:  Patent      Post Op Vitals Reviewed: Yes      Staff: CRNA         No complications documented      BP   168/87   Temp      Pulse  92   Resp   16   SpO2   97%

## 2021-08-11 NOTE — PROGRESS NOTES
Pt non compliant with keeping legs straight  Shouting out now regarding back pain  650 tylenol order taken as verbal from dr Lenny Talbot  Pt reminded to keep right lower extremity straight

## 2021-08-11 NOTE — DISCHARGE INSTRUCTIONS
Deep Sedation   AMBULATORY CARE:   Deep sedation is medicine given during procedures or treatments to keep you asleep and comfortable  It will also prevent you from remembering the procedure or treatment  Deep sedation can be given as an IV injection, a shot, a pill, or through an inhaled solution  You cannot be easily woken up during deep sedation, and you may need help to breathe  Why deep sedation is given: Deep sedation may be used to help your body heal after an injury or illness  It may be used to relax a person who is on a ventilator  It may also be used during painful procedures such as bandage changes, repair of a laceration, or drainage of an abscess  Deep sedation may be given to prevent you from moving during a test such as a lumbar puncture or bone biopsy  Deep sedation can be used for cardiac catheterization, craniotomy, or fracture repair  How to prepare for deep sedation: Your healthcare provider will talk to you about how to prepare for deep sedation  He may tell you not to eat or drink anything for 8 hours before deep sedation  You may be able to drink clear liquids up until 2 hours before deep sedation  Tell healthcare providers if you have any allergies, heart problems, or breathing problems  Arrange for someone to drive you home and stay with you for 24 hours after deep sedation  You may feel sleepy and need help doing things at home  Another person may need to call 911 if you cannot be woken  What will happen during deep sedation:   · Your healthcare provider will give you enough medicine to keep you asleep and comfortable  Your healthcare provider will monitor your blood pressure, heart rate, and breathing  You will be on a heart monitor and a pulse oximeter  A heart monitor is a safety device that stays on continuously to record your heart's electrical activity  A pulse oximeter is a device that measures the amount of oxygen in your blood        · You may get oxygen through a mask placed over your nose and mouth or through small tubes placed in your nostrils  If you cannot breathe well on your own during deep sedation, you may need an endotracheal tube  An endotracheal tube is a thin, plastic tube that is inserted through the nose or mouth and into the lungs  It is attached to a ventilator  A ventilator is a machine that gives you oxygen and breathes for you when you cannot breathe well on your own  What will happen after deep sedation: Healthcare providers will monitor you until you are awake  You may need extra oxygen if your blood oxygen level is lower than it should be  Ask your healthcare provider before you take off the mask or oxygen tubing  You may be able to go home when you are alert and can stand up  This may take 1 to 2 hours after you have received deep sedation  You may feel tired, weak, or unsteady on your feet after you get sedation  You may also have trouble concentrating or short-term memory loss  These symptoms should go away in 24 hours or less  Risks of deep sedation:   · You may get a headache or nausea from the medicine  You may have problems with your short-term memory  Your skin may itch or your eyes may water  You may not get enough sedation, or it may wear off quickly  You may feel restless during the procedure or as you wake up  · Too much medicine can cause you to be unconscious  Your healthcare provider may have trouble waking you, and you may need medicine to help you wake up  Your breathing may not be regular, or it may stop  You may need a ventilator to help you breathe  Your risk for problems with sedation is higher if you have heart or lung disease, a head injury, or drink alcohol  Call 911 or have someone else call for any of the following:   · You have sudden trouble breathing  · You cannot be woken  Seek care immediately if:   · You have a severe headache or dizziness       · Your heart is beating faster than usual      Contact your healthcare provider if:   · You have a fever  · You have nausea or are vomiting for more than 8 hours after the procedure  · Your skin is itchy, swollen, or you have a rash  · You have questions or concerns about your condition or care  Self-care:   · Have someone stay with you for 24 hours  This person can drive you to errands and help you do things around the house  This person can also watch for problems  · Rest and do quiet activities for 24 hours  Do not exercise, ride a bike, or play sports  Stand up slowly to prevent dizziness and falls  Take short walks around the house with another person  Slowly return to your usual activities the next day  · Do not drive or use dangerous machines or tools for 24 hours  You may injure yourself or others  Examples include a lawnmower, saw, or drill  Do not return to work for 24 hours if you use dangerous machines or tools for work  · Do not make important decisions for 24 hours  For example, do not sign important papers or invest money  · Drink liquids as directed  Liquids help flush the sedation medicine out of your body  Ask how much liquid to drink each day and which liquids are best for you  · Eat small, frequent meals to prevent nausea and vomiting  Start with clear liquids such as juice or broth  If you do not vomit after clear liquids, you can eat your usual foods  · Do not drink alcohol or take medicines that make you drowsy  This includes medicines that help you sleep and anxiety medicines  Ask your healthcare provider if it is safe for you to take pain medicine  Follow up with your healthcare provider as directed: Write down your questions so you remember to ask them during your visits  © Copyright [employer] 2021 Information is for End User's use only and may not be sold, redistributed or otherwise used for commercial purposes   All illustrations and images included in CareNotes® are the copyrighted property of A  [employer]  or [facility] Izabela   The above information is an  only  It is not intended as medical advice for individual conditions or treatments  Talk to your doctor, nurse or pharmacist before following any medical regimen to see if it is safe and effective for you  ARTERIOGRAM    WHAT YOU SHOULD KNOW:   An angiogram is a procedure to look at arteries in your body  Arteries are the blood vessels that carry blood from your heart to your body  AFTER YOU LEAVE:     Self-care:   · Limit activity: Rest for the remainder of the day of your procedure  Have some one with you until the next morning  Keep your arm or leg straight as much as possible  Rest as much as possible, sitting lying or reclining  Walk only to go to the bathroom, to bed or to eat  If the angiogram catheter was put in your leg, use the stairs as little as possible  No driving  · Keep your wound clean and dry  You may shower 24 hours after your procedure  The bandage you have on should fall off in 2-3 days  If there is any drainage from the puncture site, you should put on a clean bandage  · Watch for bleeding and bruising: It is normal to have a bruise and soreness where the angiogram catheter went in  · Diet:   · You may resume your regular diet, Sips of flat soda will help with mild nausea  · Drink more liquids than usual for the next 24 hours      · IMMEDIATELY Contact Interventional Radiology at [phone] Corinne PATIENTS: Contact Interventional Radiology at [phone]) Lo Parrish PATIENTS: Contact Interventional Radiology at [phone]) if any of the following occur:  · If your bruise gets larger or if you notice any active bleeding  APPLY DIRECT PRESSURE TO THE BLEEDING SITE  · If you notice increased swelling or have increased pain at the puncture site   · If you have any numbness or pain in the extremity of the puncture site   · If that extremity seems cold or pale      · You have fever greater than 101  · Persistent nausea or vomitting    Follow up with your primary healthcare provider  as directed: Write down your questions so you remember to ask them during your visits

## 2021-08-14 DIAGNOSIS — J44.9 CHRONIC OBSTRUCTIVE PULMONARY DISEASE, UNSPECIFIED COPD TYPE (HCC): ICD-10-CM

## 2021-08-18 DIAGNOSIS — E11.9 DIABETES (HCC): ICD-10-CM

## 2021-08-18 DIAGNOSIS — M79.672 LEFT FOOT PAIN: ICD-10-CM

## 2021-08-19 RX ORDER — FLUTICASONE PROPIONATE AND SALMETEROL XINAFOATE 115; 21 UG/1; UG/1
AEROSOL, METERED RESPIRATORY (INHALATION)
Qty: 12 G | Refills: 3 | Status: SHIPPED | OUTPATIENT
Start: 2021-08-19 | End: 2021-08-22 | Stop reason: ALTCHOICE

## 2021-08-22 ENCOUNTER — APPOINTMENT (EMERGENCY)
Dept: VASCULAR ULTRASOUND | Facility: HOSPITAL | Age: 36
End: 2021-08-22
Attending: EMERGENCY MEDICINE
Payer: MEDICARE

## 2021-08-22 ENCOUNTER — APPOINTMENT (EMERGENCY)
Dept: RADIOLOGY | Facility: HOSPITAL | Age: 36
End: 2021-08-22
Payer: MEDICARE

## 2021-08-22 ENCOUNTER — APPOINTMENT (EMERGENCY)
Dept: RADIOLOGY | Facility: HOSPITAL | Age: 36
DRG: 271 | End: 2021-08-22
Payer: MEDICARE

## 2021-08-22 ENCOUNTER — HOSPITAL ENCOUNTER (INPATIENT)
Facility: HOSPITAL | Age: 36
LOS: 5 days | Discharge: HOME | DRG: 271 | End: 2021-08-27
Attending: INTERNAL MEDICINE | Admitting: INTERNAL MEDICINE
Payer: MEDICARE

## 2021-08-22 ENCOUNTER — HOSPITAL ENCOUNTER (EMERGENCY)
Facility: HOSPITAL | Age: 36
End: 2021-08-22
Attending: EMERGENCY MEDICINE | Admitting: EMERGENCY MEDICINE
Payer: MEDICARE

## 2021-08-22 VITALS
DIASTOLIC BLOOD PRESSURE: 109 MMHG | RESPIRATION RATE: 18 BRPM | OXYGEN SATURATION: 98 % | TEMPERATURE: 98.5 F | WEIGHT: 213 LBS | BODY MASS INDEX: 40.25 KG/M2 | HEART RATE: 76 BPM | SYSTOLIC BLOOD PRESSURE: 190 MMHG

## 2021-08-22 DIAGNOSIS — E11.42 TYPE 2 DIABETES MELLITUS WITH DIABETIC POLYNEUROPATHY, WITH LONG-TERM CURRENT USE OF INSULIN (HCC): ICD-10-CM

## 2021-08-22 DIAGNOSIS — R07.9 CHEST PAIN: ICD-10-CM

## 2021-08-22 DIAGNOSIS — Z79.4 TYPE 2 DIABETES MELLITUS WITH DIABETIC POLYNEUROPATHY, WITH LONG-TERM CURRENT USE OF INSULIN (HCC): ICD-10-CM

## 2021-08-22 DIAGNOSIS — M79.672 LEFT FOOT PAIN: ICD-10-CM

## 2021-08-22 DIAGNOSIS — E11.65 UNCONTROLLED TYPE 2 DIABETES MELLITUS WITH HYPERGLYCEMIA (HCC): ICD-10-CM

## 2021-08-22 DIAGNOSIS — L03.90 CELLULITIS: ICD-10-CM

## 2021-08-22 DIAGNOSIS — T82.868A ARTERIAL STENT THROMBOSIS, INITIAL ENCOUNTER (HCC): Primary | ICD-10-CM

## 2021-08-22 DIAGNOSIS — M79.605 LEG PAIN, CENTRAL, LEFT: ICD-10-CM

## 2021-08-22 DIAGNOSIS — I70.245 ATHEROSCLEROSIS OF NATIVE ARTERIES OF LEFT LEG WITH ULCERATION OF OTHER PART OF FOOT (HCC): Primary | ICD-10-CM

## 2021-08-22 DIAGNOSIS — M25.562 ACUTE PAIN OF LEFT KNEE: ICD-10-CM

## 2021-08-22 DIAGNOSIS — E11.9 DIABETES (HCC): ICD-10-CM

## 2021-08-22 DIAGNOSIS — M70.42 PREPATELLAR BURSITIS OF LEFT KNEE: ICD-10-CM

## 2021-08-22 DIAGNOSIS — I70.209 SUPERFICIAL FEMORAL ARTERY OCCLUSION (HCC): ICD-10-CM

## 2021-08-22 PROBLEM — M70.52 BURSITIS OF LEFT KNEE: Status: ACTIVE | Noted: 2021-08-22

## 2021-08-22 LAB
ANION GAP SERPL CALCULATED.3IONS-SCNC: 9 MMOL/L (ref 4–13)
APTT PPP: 25 SECONDS (ref 23–31)
APTT PPP: >210 SECONDS (ref 23–37)
BASOPHILS # BLD AUTO: 0.02 THOUSANDS/ΜL (ref 0–0.1)
BASOPHILS NFR BLD AUTO: 0 % (ref 0–1)
BUN SERPL-MCNC: 14 MG/DL (ref 6–20)
CALCIUM SERPL-MCNC: 9.1 MG/DL (ref 8.4–10.2)
CHLORIDE SERPL-SCNC: 100 MMOL/L (ref 96–108)
CO2 SERPL-SCNC: 26 MMOL/L (ref 22–33)
CREAT SERPL-MCNC: 0.65 MG/DL (ref 0.4–1.1)
EOSINOPHIL # BLD AUTO: 0.2 THOUSAND/ΜL (ref 0–0.61)
EOSINOPHIL NFR BLD AUTO: 3 % (ref 0–6)
ERYTHROCYTE [DISTWIDTH] IN BLOOD BY AUTOMATED COUNT: 12.8 % (ref 11.6–15.1)
GFR SERPL CREATININE-BSD FRML MDRD: 134 ML/MIN/1.73SQ M
GLUCOSE SERPL-MCNC: 330 MG/DL (ref 65–140)
HCG SERPL QL: NEGATIVE
HCT VFR BLD AUTO: 40.5 % (ref 34.8–46.1)
HGB BLD-MCNC: 14.9 G/DL (ref 11.5–15.4)
IMM GRANULOCYTES # BLD AUTO: 0.03 THOUSAND/UL (ref 0–0.2)
IMM GRANULOCYTES NFR BLD AUTO: 0 % (ref 0–2)
INR PPP: 0.91 (ref 0.9–1.1)
LACTATE SERPL-SCNC: 1.1 MMOL/L (ref 0–2)
LYMPHOCYTES # BLD AUTO: 1.59 THOUSANDS/ΜL (ref 0.6–4.47)
LYMPHOCYTES NFR BLD AUTO: 20 % (ref 14–44)
MCH RBC QN AUTO: 28.3 PG (ref 26.8–34.3)
MCHC RBC AUTO-ENTMCNC: 36.8 G/DL (ref 31.4–37.4)
MCV RBC AUTO: 77 FL (ref 82–98)
MONOCYTES # BLD AUTO: 0.42 THOUSAND/ΜL (ref 0.17–1.22)
MONOCYTES NFR BLD AUTO: 5 % (ref 4–12)
NEUTROPHILS # BLD AUTO: 5.8 THOUSANDS/ΜL (ref 1.85–7.62)
NEUTS SEG NFR BLD AUTO: 72 % (ref 43–75)
PLATELET # BLD AUTO: 402 THOUSANDS/UL (ref 149–390)
PMV BLD AUTO: 10.7 FL (ref 8.9–12.7)
POTASSIUM SERPL-SCNC: 3.4 MMOL/L (ref 3.5–5)
PROCALCITONIN SERPL-MCNC: <0.05 NG/ML
PROTHROMBIN TIME: 10.3 SECONDS (ref 9.5–12.1)
RBC # BLD AUTO: 5.26 MILLION/UL (ref 3.81–5.12)
SODIUM SERPL-SCNC: 135 MMOL/L (ref 133–145)
TROPONIN I SERPL-MCNC: <0.03 NG/ML (ref 0–0.07)
WBC # BLD AUTO: 8.06 THOUSAND/UL (ref 4.31–10.16)

## 2021-08-22 PROCEDURE — 85610 PROTHROMBIN TIME: CPT | Performed by: EMERGENCY MEDICINE

## 2021-08-22 PROCEDURE — 93971 EXTREMITY STUDY: CPT

## 2021-08-22 PROCEDURE — 73701 CT LOWER EXTREMITY W/DYE: CPT

## 2021-08-22 PROCEDURE — 87040 BLOOD CULTURE FOR BACTERIA: CPT | Performed by: EMERGENCY MEDICINE

## 2021-08-22 PROCEDURE — 99285 EMERGENCY DEPT VISIT HI MDM: CPT | Performed by: EMERGENCY MEDICINE

## 2021-08-22 PROCEDURE — 36415 COLL VENOUS BLD VENIPUNCTURE: CPT | Performed by: EMERGENCY MEDICINE

## 2021-08-22 PROCEDURE — 85730 THROMBOPLASTIN TIME PARTIAL: CPT | Performed by: EMERGENCY MEDICINE

## 2021-08-22 PROCEDURE — 93005 ELECTROCARDIOGRAM TRACING: CPT

## 2021-08-22 PROCEDURE — 85730 THROMBOPLASTIN TIME PARTIAL: CPT | Performed by: SURGERY

## 2021-08-22 PROCEDURE — 96375 TX/PRO/DX INJ NEW DRUG ADDON: CPT

## 2021-08-22 PROCEDURE — 83036 HEMOGLOBIN GLYCOSYLATED A1C: CPT | Performed by: INTERNAL MEDICINE

## 2021-08-22 PROCEDURE — 84145 PROCALCITONIN (PCT): CPT | Performed by: INTERNAL MEDICINE

## 2021-08-22 PROCEDURE — 96365 THER/PROPH/DIAG IV INF INIT: CPT

## 2021-08-22 PROCEDURE — 87040 BLOOD CULTURE FOR BACTERIA: CPT | Performed by: INTERNAL MEDICINE

## 2021-08-22 PROCEDURE — 71045 X-RAY EXAM CHEST 1 VIEW: CPT

## 2021-08-22 PROCEDURE — 99223 1ST HOSP IP/OBS HIGH 75: CPT | Performed by: INTERNAL MEDICINE

## 2021-08-22 PROCEDURE — 83605 ASSAY OF LACTIC ACID: CPT | Performed by: EMERGENCY MEDICINE

## 2021-08-22 PROCEDURE — 84484 ASSAY OF TROPONIN QUANT: CPT | Performed by: EMERGENCY MEDICINE

## 2021-08-22 PROCEDURE — 99285 EMERGENCY DEPT VISIT HI MDM: CPT

## 2021-08-22 PROCEDURE — 87081 CULTURE SCREEN ONLY: CPT | Performed by: INTERNAL MEDICINE

## 2021-08-22 PROCEDURE — 80048 BASIC METABOLIC PNL TOTAL CA: CPT | Performed by: EMERGENCY MEDICINE

## 2021-08-22 PROCEDURE — 93926 LOWER EXTREMITY STUDY: CPT

## 2021-08-22 PROCEDURE — 85025 COMPLETE CBC W/AUTO DIFF WBC: CPT | Performed by: EMERGENCY MEDICINE

## 2021-08-22 PROCEDURE — 84703 CHORIONIC GONADOTROPIN ASSAY: CPT | Performed by: EMERGENCY MEDICINE

## 2021-08-22 RX ORDER — HEPARIN SODIUM 1000 [USP'U]/ML
7600 INJECTION, SOLUTION INTRAVENOUS; SUBCUTANEOUS ONCE
Status: COMPLETED | OUTPATIENT
Start: 2021-08-22 | End: 2021-08-22

## 2021-08-22 RX ORDER — ONDANSETRON 2 MG/ML
4 INJECTION INTRAMUSCULAR; INTRAVENOUS EVERY 6 HOURS PRN
Status: DISCONTINUED | OUTPATIENT
Start: 2021-08-22 | End: 2021-08-27 | Stop reason: HOSPADM

## 2021-08-22 RX ORDER — ATORVASTATIN CALCIUM 40 MG/1
40 TABLET, FILM COATED ORAL
Status: DISCONTINUED | OUTPATIENT
Start: 2021-08-23 | End: 2021-08-27 | Stop reason: HOSPADM

## 2021-08-22 RX ORDER — HEPARIN SODIUM 10000 [USP'U]/100ML
3-30 INJECTION, SOLUTION INTRAVENOUS
Status: DISCONTINUED | OUTPATIENT
Start: 2021-08-22 | End: 2021-08-27

## 2021-08-22 RX ORDER — HEPARIN SODIUM 1000 [USP'U]/ML
3800 INJECTION, SOLUTION INTRAVENOUS; SUBCUTANEOUS
Status: DISCONTINUED | OUTPATIENT
Start: 2021-08-22 | End: 2021-08-27

## 2021-08-22 RX ORDER — HEPARIN SODIUM 1000 [USP'U]/ML
7600 INJECTION, SOLUTION INTRAVENOUS; SUBCUTANEOUS
Status: DISCONTINUED | OUTPATIENT
Start: 2021-08-22 | End: 2021-08-22 | Stop reason: HOSPADM

## 2021-08-22 RX ORDER — TRAZODONE HYDROCHLORIDE 100 MG/1
200 TABLET ORAL
Status: DISCONTINUED | OUTPATIENT
Start: 2021-08-22 | End: 2021-08-27 | Stop reason: HOSPADM

## 2021-08-22 RX ORDER — DOCUSATE SODIUM 100 MG/1
100 CAPSULE, LIQUID FILLED ORAL 2 TIMES DAILY PRN
Status: DISCONTINUED | OUTPATIENT
Start: 2021-08-22 | End: 2021-08-27 | Stop reason: HOSPADM

## 2021-08-22 RX ORDER — CHLORPROMAZINE HYDROCHLORIDE 100 MG/1
100 TABLET, FILM COATED ORAL
Status: DISCONTINUED | OUTPATIENT
Start: 2021-08-22 | End: 2021-08-23 | Stop reason: RX

## 2021-08-22 RX ORDER — SODIUM CHLORIDE 9 MG/ML
3 INJECTION INTRAVENOUS
Status: DISCONTINUED | OUTPATIENT
Start: 2021-08-22 | End: 2021-08-22 | Stop reason: HOSPADM

## 2021-08-22 RX ORDER — PRAZOSIN HYDROCHLORIDE 1 MG/1
1 CAPSULE ORAL
Status: DISCONTINUED | OUTPATIENT
Start: 2021-08-22 | End: 2021-08-27 | Stop reason: HOSPADM

## 2021-08-22 RX ORDER — OXYCODONE HYDROCHLORIDE 5 MG/1
5 TABLET ORAL EVERY 4 HOURS PRN
Status: DISCONTINUED | OUTPATIENT
Start: 2021-08-22 | End: 2021-08-24

## 2021-08-22 RX ORDER — KETOROLAC TROMETHAMINE 30 MG/ML
15 INJECTION, SOLUTION INTRAMUSCULAR; INTRAVENOUS ONCE
Status: COMPLETED | OUTPATIENT
Start: 2021-08-22 | End: 2021-08-22

## 2021-08-22 RX ORDER — ACETAMINOPHEN 325 MG/1
650 TABLET ORAL EVERY 6 HOURS PRN
Status: DISCONTINUED | OUTPATIENT
Start: 2021-08-22 | End: 2021-08-27 | Stop reason: HOSPADM

## 2021-08-22 RX ORDER — HEPARIN SODIUM 1000 [USP'U]/ML
7600 INJECTION, SOLUTION INTRAVENOUS; SUBCUTANEOUS
Status: DISCONTINUED | OUTPATIENT
Start: 2021-08-22 | End: 2021-08-27

## 2021-08-22 RX ORDER — HYDROXYZINE HYDROCHLORIDE 25 MG/1
50 TABLET, FILM COATED ORAL 3 TIMES DAILY
Status: DISCONTINUED | OUTPATIENT
Start: 2021-08-22 | End: 2021-08-24

## 2021-08-22 RX ORDER — SODIUM CHLORIDE 9 MG/ML
75 INJECTION, SOLUTION INTRAVENOUS CONTINUOUS
Status: DISCONTINUED | OUTPATIENT
Start: 2021-08-23 | End: 2021-08-27 | Stop reason: HOSPADM

## 2021-08-22 RX ORDER — QUETIAPINE FUMARATE 100 MG/1
300 TABLET, FILM COATED ORAL DAILY
Status: DISCONTINUED | OUTPATIENT
Start: 2021-08-23 | End: 2021-08-24

## 2021-08-22 RX ORDER — NICOTINE 21 MG/24HR
1 PATCH, TRANSDERMAL 24 HOURS TRANSDERMAL DAILY
Status: DISCONTINUED | OUTPATIENT
Start: 2021-08-23 | End: 2021-08-27 | Stop reason: HOSPADM

## 2021-08-22 RX ORDER — MAGNESIUM HYDROXIDE/ALUMINUM HYDROXICE/SIMETHICONE 120; 1200; 1200 MG/30ML; MG/30ML; MG/30ML
30 SUSPENSION ORAL EVERY 6 HOURS PRN
Status: DISCONTINUED | OUTPATIENT
Start: 2021-08-22 | End: 2021-08-27 | Stop reason: HOSPADM

## 2021-08-22 RX ORDER — HEPARIN SODIUM 1000 [USP'U]/ML
3800 INJECTION, SOLUTION INTRAVENOUS; SUBCUTANEOUS
Status: DISCONTINUED | OUTPATIENT
Start: 2021-08-22 | End: 2021-08-22 | Stop reason: HOSPADM

## 2021-08-22 RX ORDER — INSULIN GLARGINE 100 [IU]/ML
30 INJECTION, SOLUTION SUBCUTANEOUS EVERY MORNING
Status: DISCONTINUED | OUTPATIENT
Start: 2021-08-23 | End: 2021-08-23

## 2021-08-22 RX ORDER — HYDROMORPHONE HCL/PF 1 MG/ML
0.5 SYRINGE (ML) INJECTION
Status: DISCONTINUED | OUTPATIENT
Start: 2021-08-22 | End: 2021-08-23

## 2021-08-22 RX ORDER — LAMOTRIGINE 25 MG/1
25 TABLET ORAL DAILY
Status: DISCONTINUED | OUTPATIENT
Start: 2021-08-23 | End: 2021-08-27 | Stop reason: HOSPADM

## 2021-08-22 RX ORDER — LISINOPRIL 20 MG/1
20 TABLET ORAL DAILY
Status: DISCONTINUED | OUTPATIENT
Start: 2021-08-23 | End: 2021-08-23

## 2021-08-22 RX ORDER — HEPARIN SODIUM 10000 [USP'U]/100ML
3-30 INJECTION, SOLUTION INTRAVENOUS
Status: DISCONTINUED | OUTPATIENT
Start: 2021-08-22 | End: 2021-08-22 | Stop reason: HOSPADM

## 2021-08-22 RX ADMIN — HEPARIN SODIUM 7600 UNITS: 1000 INJECTION INTRAVENOUS; SUBCUTANEOUS at 18:56

## 2021-08-22 RX ADMIN — HEPARIN SODIUM 18 UNITS/KG/HR: 10000 INJECTION, SOLUTION INTRAVENOUS at 21:38

## 2021-08-22 RX ADMIN — HEPARIN SODIUM 18 UNITS/KG/HR: 10000 INJECTION, SOLUTION INTRAVENOUS at 19:07

## 2021-08-22 RX ADMIN — HEPARIN SODIUM 7600 UNITS: 1000 INJECTION, SOLUTION INTRAVENOUS; SUBCUTANEOUS at 21:38

## 2021-08-22 RX ADMIN — KETOROLAC TROMETHAMINE 15 MG: 30 INJECTION, SOLUTION INTRAMUSCULAR; INTRAVENOUS at 15:52

## 2021-08-22 RX ADMIN — IOHEXOL 100 ML: 350 INJECTION, SOLUTION INTRAVENOUS at 22:40

## 2021-08-22 NOTE — ED NOTES
Pt significant other provided with meal  Pt verbalized understanding of not being able to eat  Both were provided with blankets       Oliver Singh RN  08/22/21 8995

## 2021-08-22 NOTE — ED NOTES
Spoke with vascular tech on call who is aware of ordered vascular study     Theresa Gatica RN  08/22/21 0066

## 2021-08-22 NOTE — ED PROVIDER NOTES
History  Chief Complaint   Patient presents with    Leg Pain     left leg pain with knee burning  Reports starting with a puss bubble since last Saturday  Had stent placed in leg the 11th  Reports constant chest pain into right arm, reports muscle pulling when breathing and pain when breathing     81-year-old female history of asthma, bipolar, COPD, PTSD, diabetes presents the emergency department for left lower leg pain  Present since she had a stent placed on August 11th by Interventional Radiology at [facility]  Patient notes warmth in the knee for the last 2 days and increasing pain  No fevers, nausea, vomiting  Patient also complains of chest pain  Chest pain located in the left chest, radiates to the right shoulder  Present since this morning  Constant  No diaphoresis, nausea, vomiting or other associated symptoms  Cardiac risk factors of hypertension, hypercholesterolemia, diabetes, obesity, family history of CAD before age 72  No recent travel hospitalizations  No hemoptysis  No VTE history  Not on estrogen supplementation  Patient is taking Plavix daily since stent placement  Chest Pain  Pain location:  Substernal area  Pain quality: stabbing    Pain radiates to:  R shoulder  Pain severity:  Moderate  Onset quality:  Gradual  Duration:  6 hours  Timing:  Constant  Progression:  Unchanged  Knee Pain      Prior to Admission Medications   Prescriptions Last Dose Informant Patient Reported? Taking?    ASPIRIN 81 PO Past Week at Unknown time Self Yes Yes   Sig: Take 81 mg by mouth daily    BD Pen Needle Micro U/F 32G X 6 MM MISC Unknown at Unknown time Self No No   Sig: use 1 NEEDLE to inject MEDICATION subcutaneously twice a day   Blood Glucose Monitoring Suppl (True Metrix Air Glucose Meter) w/Device KIT Unknown at Unknown time Self No No   Sig: use as directed   Blood Pressure Monitoring (Blood Pressure Monitor Automat) KATLYN Unknown at Unknown time Self No No   Sig: Use Daily as Directed   Dulaglutide (Trulicity) 1 5 LJ/2 0RI SOPN More than a month at Unknown time  No No   Sig: Tony Roberto   GlucaGen HypoKit 1 MG injection Unknown at Unknown time Self No No   Sig: INJECT 1MG UNDER THE SKIN ONCE AS NEEDED FOR LOW BLOOD SUGAR FOR UP TO 1 DOSE  INJECT 1MG UNDER THE SKIN AS NEEDED FOR BLOOD SUGAR LESS THAN 70 FOR UP TO 1 DOSW IF UNABLE TO SWALLOW   Patient not taking: Reported on 7/9/2021   Glucagon Emergency 1 MG injection Unknown at Unknown time Self Yes No   Patient not taking: Reported on 7/9/2021   QUEtiapine (SEROquel) 300 mg tablet 8/21/2021 at Unknown time Self No Yes   Sig: take 1 tablet by mouth once daily   atorvastatin (LIPITOR) 40 mg tablet Past Week at Unknown time Self Yes Yes   chlorproMAZINE (THORAZINE) 100 mg tablet 8/21/2021 at Unknown time Self Yes Yes   Sig: Take 100 mg by mouth daily at bedtime   clopidogrel (PLAVIX) 75 mg tablet 8/22/2021 at Unknown time  No Yes   Sig: Take 1 tablet (75 mg total) by mouth daily   glucose blood test strip Unknown at Unknown time  No No   Sig: Use one strip 3 times daily for blood glucose testing  Patient has True Metrix glucometer   glucose monitoring kit (FREESTYLE) monitoring kit Unknown at Unknown time Self No No   Sig: Use 1 each daily Use one each daily to check blood sugars   Please refill with freestyle ed or with whichever brand is covered by insurance   hydrOXYzine HCL (ATARAX) 50 mg tablet Past Week at Unknown time Self Yes Yes   Sig: Take 50 mg by mouth 3 (three) times a day   insulin glargine (Lantus SoloStar) 100 units/mL injection pen More than a month at Unknown time  No No   Sig: Inject 30 Units under the skin daily   insulin lispro (HumaLOG) 100 units/mL injection More than a month at Unknown time Self Yes No   Sig: Inject under the skin 2 (two) times a day   Patient not taking: Reported on 7/9/2021   lamoTRIgine (LaMICtal) 25 mg tablet More than a month at Unknown time  No No   Sig: TAKE ONE (1) TABLET BY MOUTH DAILY   lisinopril (ZESTRIL) 10 mg tablet 2021 at Unknown time  No Yes   Sig: TAKE TWO (2) TABLETS BY MOUTH DAILY   lurasidone (Latuda) 40 mg tablet 2021 at Unknown time Self Yes Yes   Sig: Take 20 mg by mouth daily with breakfast   metFORMIN (GLUCOPHAGE) 1000 MG tablet 2021 at Unknown time  No Yes   Sig: TAKE ONE (1) TABLET BY MOUTH TWICE DAILY WITH FOOD   prazosin (MINIPRESS) 1 mg capsule 2021 at Unknown time Self Yes Yes   Sig: Take 1 mg by mouth daily at bedtime   traZODone (DESYREL) 100 mg tablet 2021 at Unknown time Self No Yes   Sig: TAKE 2 TABLETS BY MOUTH DAILY AT BEDTIME      Facility-Administered Medications Last Administration Doses Remaining   lidocaine (LMX) 4 % cream None recorded           Past Medical History:   Diagnosis Date    Asthma     Bipolar 1 disorder (HCC)     COPD (chronic obstructive pulmonary disease) (HCC)     Depression     Diabetes mellitus (HCC)     Hypertension     Psychiatric disorder     cutting history    PTSD (post-traumatic stress disorder)     Tendonitis        Past Surgical History:   Procedure Laterality Date     SECTION      EAR SURGERY      IR LOWER EXTREMITY ANGIOGRAM  2021       Family History   Problem Relation Age of Onset    Hypertension Mother     Diabetes Mother     HIV Mother     Heart disease Mother     No Known Problems Father      I have reviewed and agree with the history as documented      E-Cigarette/Vaping    E-Cigarette Use Never User      E-Cigarette/Vaping Substances    Nicotine No     THC No     CBD No     Flavoring No     Other No     Unknown No      Social History     Tobacco Use    Smoking status: Current Every Day Smoker     Packs/day: 2 00     Years: 17 00     Pack years: 34 00     Types: Cigarettes    Smokeless tobacco: Never Used    Tobacco comment: pt is down to 4 cigarettes a day    Vaping Use    Vaping Use: Never used   Substance Use Topics    Alcohol use: Not Currently    Drug use: Not Currently     Types: Cocaine, Marijuana     Comment: 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       Review of Systems   Cardiovascular: Positive for chest pain  Musculoskeletal: Positive for arthralgias and myalgias  Skin: Positive for color change  All other systems reviewed and are negative  Physical Exam  Physical Exam  Vitals and nursing note reviewed  Constitutional:       General: She is not in acute distress  Appearance: Normal appearance  She is not ill-appearing  HENT:      Head: Normocephalic and atraumatic  Right Ear: External ear normal       Left Ear: External ear normal       Nose: Nose normal       Mouth/Throat:      Mouth: Mucous membranes are moist    Eyes:      General:         Right eye: No discharge  Left eye: No discharge  Conjunctiva/sclera: Conjunctivae normal    Cardiovascular:      Rate and Rhythm: Normal rate and regular rhythm  Heart sounds: No murmur heard  Comments: Monophasic Dopplerable PT pulse on left  No DP pulse dopplerable  Tissue is well perfused in LLE  Pulmonary:      Effort: Pulmonary effort is normal       Breath sounds: Normal breath sounds  Abdominal:      General: Abdomen is flat  There is no distension  Tenderness: There is no abdominal tenderness  Musculoskeletal:         General: Tenderness (to palpation left knee anteriorl) present  Normal range of motion  Cervical back: Normal range of motion  Comments: Increased warmth anterior left knee  No increased warmth on lateral aspect of knee  Not erythematous   Skin:     General: Skin is warm  Capillary Refill: Capillary refill takes less than 2 seconds  Findings: No rash  Neurological:      General: No focal deficit present  Mental Status: She is alert  Mental status is at baseline     Psychiatric:         Mood and Affect: Mood normal          Behavior: Behavior normal          Vital Signs  ED Triage Vitals   Temperature Pulse Respirations Blood Pressure SpO2   08/22/21 1440 08/22/21 1440 08/22/21 1440 08/22/21 1440 08/22/21 1440   98 5 °F (36 9 °C) 75 18 (!) 196/95 98 %      Temp Source Heart Rate Source Patient Position - Orthostatic VS BP Location FiO2 (%)   08/22/21 1440 08/22/21 1440 08/22/21 1520 08/22/21 1440 --   Oral Monitor Lying Left arm       Pain Score       08/22/21 1440       8           Vitals:    08/22/21 1440 08/22/21 1520 08/22/21 1721 08/22/21 2018   BP: (!) 196/95 (!) 174/88 (!) 190/109    Pulse: 75 63 72 76   Patient Position - Orthostatic VS:  Lying Lying          Visual Acuity      ED Medications  Medications   ketorolac (TORADOL) injection 15 mg (15 mg Intravenous Given 8/22/21 1552)   heparin (porcine) injection 7,600 Units (7,600 Units Intravenous Given 8/22/21 1856)       Diagnostic Studies  Results Reviewed     Procedure Component Value Units Date/Time    APTT [[id number]]  (Normal) Collected: 08/22/21 1855    Lab Status: Final result Specimen: Blood from Arm, Right Updated: 08/22/21 1917     PTT 25 seconds     Protime-INR [[id number]]  (Normal) Collected: 08/22/21 1855    Lab Status: Final result Specimen: Blood from Arm, Right Updated: 08/22/21 1917     Protime 10 3 seconds      INR 0 91    Narrative:      INR Reference Ranges:  No Anticoagulant, Normal:           0 9-1 1  Standard Dose, Oral Anticoagulant:  2 0-3 0  High Dose, Oral Anticoagulant:      2 5-3 5    Blood culture [[id number]] Collected: 08/22/21 1518    Lab Status: Preliminary result Specimen: Blood from Arm, Right Updated: 08/22/21 1801     Blood Culture Received in Microbiology Lab  Culture in Progress  Blood culture [[id number]] Collected: 08/22/21 1552    Lab Status:  In process Specimen: Blood from Hand, Right Updated: 08/22/21 1559    hCG, qualitative pregnancy [[id number]]  (Normal) Collected: 08/22/21 1518    Lab Status: Final result Specimen: Blood from Arm, Right Updated: 08/22/21 1552     Preg, Serum Negative    Troponin I [[id number]] (Normal) Collected: 08/22/21 1518    Lab Status: Final result Specimen: Blood from Arm, Right Updated: 08/22/21 1548     Troponin I <0 03 ng/mL     Basic metabolic panel [[id number]]  (Abnormal) Collected: 08/22/21 1518    Lab Status: Final result Specimen: Blood from Arm, Right Updated: 08/22/21 1546     Sodium 135 mmol/L      Potassium 3 4 mmol/L      Chloride 100 mmol/L      CO2 26 mmol/L      ANION GAP 9 mmol/L      BUN 14 mg/dL      Creatinine 0 65 mg/dL      Glucose 330 mg/dL      Calcium 9 1 mg/dL      eGFR 134 ml/min/1 73sq m     Narrative:      [place] guidelines for Chronic Kidney Disease (CKD):     Stage 1 with normal or high GFR (GFR > 90 mL/min/1 73 square meters)    Stage 2 Mild CKD (GFR = 60-89 mL/min/1 73 square meters)    Stage 3A Moderate CKD (GFR = 45-59 mL/min/1 73 square meters)    Stage 3B Moderate CKD (GFR = 30-44 mL/min/1 73 square meters)    Stage 4 Severe CKD (GFR = 15-29 mL/min/1 73 square meters)    Stage 5 End Stage CKD (GFR <15 mL/min/1 73 square meters)  Note: GFR calculation is accurate only with a steady state creatinine    Lactic acid, plasma [[id number]]  (Normal) Collected: 08/22/21 1518    Lab Status: Final result Specimen: Blood from Arm, Right Updated: 08/22/21 1546     LACTIC ACID 1 1 mmol/L     Narrative:      Result may be elevated if tourniquet was used during collection      CBC and differential [[id number]]  (Abnormal) Collected: 08/22/21 1518    Lab Status: Final result Specimen: Blood from Arm, Right Updated: 08/22/21 1532     WBC 8 06 Thousand/uL      RBC 5 26 Million/uL      Hemoglobin 14 9 g/dL      Hematocrit 40 5 %      MCV 77 fL      MCH 28 3 pg      MCHC 36 8 g/dL      RDW 12 8 %      MPV 10 7 fL      Platelets 507 Thousands/uL      Neutrophils Relative 72 %      Immat GRANS % 0 %      Lymphocytes Relative 20 %      Monocytes Relative 5 %      Eosinophils Relative 3 %      Basophils Relative 0 %      Neutrophils Absolute 5 80 Thousands/µL      Immature Grans Absolute 0 03 Thousand/uL      Lymphocytes Absolute 1 59 Thousands/µL      Monocytes Absolute 0 42 Thousand/µL      Eosinophils Absolute 0 20 Thousand/µL      Basophils Absolute 0 02 Thousands/µL                  X-ray chest 1 view portable   ED Interpretation by Sapphire Storm DO (08/22 1639)   No acute cardiopulmonary process  VAS lower limb venous duplex study, unilateral/limited    (Results Pending)   VAS lower limb arterial duplex, limited, unilateral    (Results Pending)              Procedures  Procedures         ED Course             HEART Risk Score      Most Recent Value   Heart Score Risk Calculator   History  0 Filed at: 08/22/2021 2105   ECG  0 Filed at: 08/22/2021 2105   Age  0 Filed at: 08/22/2021 2105   Risk Factors  2 Filed at: 08/22/2021 2105   Troponin  0 Filed at: 08/22/2021 2105   HEART Score  2 Filed at: 08/22/2021 2105                                    MDM  Number of Diagnoses or Management Options  Arterial stent thrombosis, initial encounter [facility]): new and requires workup  Chest pain: new and requires workup  Leg pain, central, left: new and requires workup  Diagnosis management comments: Concern for DVT, Stent thrombosis, cellulitis  Knee not typical for septic arthritis  Pain with active movement primarily  No warmth surrounding knee  Primarily just anteriorly  Will get blood cultures, duplex, lactic  Blood work without acute findings  Duplex shows no DVT  SFA stent is occluded  Called vascular  Will start heparin ggt and send to [place]  Regarding chest pain  Eval for ACS, arrhtymia, electrolyte disturbance, acute thoracic process  No acute findings  Low suspicion of cardiac origin         Amount and/or Complexity of Data Reviewed  Clinical lab tests: ordered and reviewed  Tests in the radiology section of CPT®: ordered and reviewed  Review and summarize past medical records: yes  Discuss the patient with other providers: yes  Independent visualization of images, tracings, or specimens: yes    Risk of Complications, Morbidity, and/or Mortality  Presenting problems: high  Diagnostic procedures: high  Management options: high        Disposition  Final diagnoses:   Arterial stent thrombosis, initial encounter ([facility] )   Leg pain, central, left   Chest pain     Time reflects when diagnosis was documented in both MDM as applicable and the Disposition within this note     Time User Action Codes Description Comment    8/22/2021  6:27 PM Kathern Severs Add [U82 236P] Arterial stent thrombosis, initial encounter ([facility] 75 )     8/22/2021  6:27 PM Kathern Severs Add [M79 [id number]] Leg pain, central, left     8/22/2021  6:27 PM Kathern Severs Add [R07 [id number]] Chest pain       ED Disposition     ED Disposition Condition Date/Time Comment    Transfer to Another Facility-In Network  [place] Aug 22, 2021  6:26 PM Annamarie Shirts should be transferred out to [facility]          MD Documentation      Most Recent Value   Patient Condition  The patient has been stabilized such that within reasonable medical probability, no material deterioration of the patient condition or the condition of the unborn child(rosalie) is likely to result from the transfer   Reason for Transfer  Level of Care needed not available at this facility   Benefits of Transfer  Specialized equipment and/or services available at the receiving facility (Include comment)________________________   Risks of Transfer  Potential for delay in receiving treatment, Potential deterioration of medical condition, Loss of IV, Increased discomfort during transfer, Possible worsening of condition or death during transfer   Accepting Physician  Dr Angeli May Name, [address] [place] [address] [address]    (Name & Tel number)  Kian Noble     Sending MD Dr Emilie Johnson   Provider Certification  General risk, such as traffic hazards, adverse weather conditions, rough terrain or turbulence, possible failure of equipment (including vehicle or aircraft), or consequences of actions of persons outside the control of the transport personnel, Unanticipated needs of medical equipment and personnel during transport, Risk of worsening condition, The possibility of a transport vehicle being unavailable      RN Documentation      [facility], [facility]   [facility] (Name & Tel number)  Daiana Joya        Follow-up Information    None         Discharge Medication List as of 8/22/2021  8:31 PM      CONTINUE these medications which have NOT CHANGED    Details   ASPIRIN 81 PO Take 81 mg by mouth daily , Historical Med      atorvastatin (LIPITOR) 40 mg tablet Starting Sat 8/8/2020, Historical Med      BD Pen Needle Micro U/F 32G X 6 MM MISC use 1 NEEDLE to inject MEDICATION subcutaneously twice a day, Normal      Blood Glucose Monitoring Suppl (True Metrix Air Glucose Meter) w/Device KIT use as directed, Normal      Blood Pressure Monitoring (Blood Pressure Monitor Automat) KATLYN Use Daily as Directed, Print      chlorproMAZINE (THORAZINE) 100 mg tablet Take 100 mg by mouth daily at bedtime, Starting Wed 11/18/2020, Historical Med      clopidogrel (PLAVIX) 75 mg tablet Take 1 tablet (75 mg total) by mouth daily, Starting Wed 8/11/2021, Normal      Dulaglutide (Trulicity) 1 5 CP/7 7AT DANIELLE Roberto, Print      GlucaGen HypoKit 1 MG injection INJECT 1MG UNDER THE SKIN ONCE AS NEEDED FOR LOW BLOOD SUGAR FOR UP TO 1 DOSE  INJECT 1MG UNDER THE SKIN AS NEEDED FOR BLOOD SUGAR LESS THAN 70 FOR UP TO 1 DOSW IF UNABLE TO SWALLOW, Normal      Glucagon Emergency 1 MG injection Starting Thu 11/19/2020, Historical Med      glucose blood test strip Use one strip 3 times daily for blood glucose testing   Patient has True Metrix glucometer, Normal      glucose monitoring kit (FREESTYLE) monitoring kit Use 1 each daily Use one each daily to check blood sugars  Please refill with freestyle ed or with whichever brand is covered by insurance, Starting Tue 11/24/2020, Normal      hydrOXYzine HCL (ATARAX) 50 mg tablet Take 50 mg by mouth 3 (three) times a day, Starting Wed 11/18/2020, Historical Med      insulin glargine (Lantus SoloStar) 100 units/mL injection pen Inject 30 Units under the skin daily, Starting Mon 7/26/2021, Normal      insulin lispro (HumaLOG) 100 units/mL injection Inject under the skin 2 (two) times a day, Historical Med      lamoTRIgine (LaMICtal) 25 mg tablet TAKE ONE (1) TABLET BY MOUTH DAILY, Normal      lisinopril (ZESTRIL) 10 mg tablet TAKE TWO (2) TABLETS BY MOUTH DAILY, Normal      lurasidone (Latuda) 40 mg tablet Take 20 mg by mouth daily with breakfast, Historical Med      metFORMIN (GLUCOPHAGE) 1000 MG tablet TAKE ONE (1) TABLET BY MOUTH TWICE DAILY WITH FOOD, Normal      prazosin (MINIPRESS) 1 mg capsule Take 1 mg by mouth daily at bedtime, Starting Wed 11/18/2020, Historical Med      QUEtiapine (SEROquel) 300 mg tablet take 1 tablet by mouth once daily, Normal      traZODone (DESYREL) 100 mg tablet TAKE 2 TABLETS BY MOUTH DAILY AT BEDTIME, Normal           No discharge procedures on file      PDMP Review       Value Time User    PDMP Reviewed  Yes 4/25/2021  8:59 PM Bert Olmedo MD          ED Provider  Electronically Signed by           Mali Carrizales DO  08/22/21 0171

## 2021-08-22 NOTE — ED NOTES
Going to B ED   2030 p/u [facility] is accepting   [phone]     Maddi Vieyra, RN  08/22/21 7629

## 2021-08-23 PROBLEM — I73.9 PERIPHERAL ARTERY DISEASE (HCC): Status: ACTIVE | Noted: 2021-07-09

## 2021-08-23 PROBLEM — M25.562 ACUTE PAIN OF LEFT KNEE: Status: ACTIVE | Noted: 2021-08-23

## 2021-08-23 LAB
ALBUMIN SERPL BCP-MCNC: 2.2 G/DL (ref 3.5–5)
ALP SERPL-CCNC: 92 U/L (ref 46–116)
ALT SERPL W P-5'-P-CCNC: 15 U/L (ref 12–78)
ANION GAP SERPL CALCULATED.3IONS-SCNC: 4 MMOL/L (ref 4–13)
APTT PPP: 192 SECONDS (ref 23–37)
APTT PPP: 93 SECONDS (ref 23–37)
AST SERPL W P-5'-P-CCNC: 5 U/L (ref 5–45)
ATRIAL RATE: 66 BPM
BASOPHILS # BLD AUTO: 0.06 THOUSANDS/ΜL (ref 0–0.1)
BASOPHILS NFR BLD AUTO: 1 % (ref 0–1)
BILIRUB SERPL-MCNC: 0.15 MG/DL (ref 0.2–1)
BUN SERPL-MCNC: 17 MG/DL (ref 5–25)
CALCIUM ALBUM COR SERPL-MCNC: 9.3 MG/DL (ref 8.3–10.1)
CALCIUM SERPL-MCNC: 7.9 MG/DL (ref 8.3–10.1)
CHLORIDE SERPL-SCNC: 105 MMOL/L (ref 100–108)
CO2 SERPL-SCNC: 27 MMOL/L (ref 21–32)
CREAT SERPL-MCNC: 0.57 MG/DL (ref 0.6–1.3)
EOSINOPHIL # BLD AUTO: 0.33 THOUSAND/ΜL (ref 0–0.61)
EOSINOPHIL NFR BLD AUTO: 4 % (ref 0–6)
ERYTHROCYTE [DISTWIDTH] IN BLOOD BY AUTOMATED COUNT: 12.7 % (ref 11.6–15.1)
EST. AVERAGE GLUCOSE BLD GHB EST-MCNC: 226 MG/DL
GFR SERPL CREATININE-BSD FRML MDRD: 140 ML/MIN/1.73SQ M
GLUCOSE SERPL-MCNC: 222 MG/DL (ref 65–140)
GLUCOSE SERPL-MCNC: 247 MG/DL (ref 65–140)
GLUCOSE SERPL-MCNC: 279 MG/DL (ref 65–140)
GLUCOSE SERPL-MCNC: 281 MG/DL (ref 65–140)
GLUCOSE SERPL-MCNC: 303 MG/DL (ref 65–140)
GLUCOSE SERPL-MCNC: 327 MG/DL (ref 65–140)
HBA1C MFR BLD: 9.5 %
HCT VFR BLD AUTO: 34.9 % (ref 34.8–46.1)
HGB BLD-MCNC: 12.6 G/DL (ref 11.5–15.4)
IMM GRANULOCYTES # BLD AUTO: 0.04 THOUSAND/UL (ref 0–0.2)
IMM GRANULOCYTES NFR BLD AUTO: 1 % (ref 0–2)
INR PPP: 1.01 (ref 0.84–1.19)
LYMPHOCYTES # BLD AUTO: 2.77 THOUSANDS/ΜL (ref 0.6–4.47)
LYMPHOCYTES NFR BLD AUTO: 32 % (ref 14–44)
MAGNESIUM SERPL-MCNC: 1.9 MG/DL (ref 1.6–2.6)
MCH RBC QN AUTO: 28.8 PG (ref 26.8–34.3)
MCHC RBC AUTO-ENTMCNC: 36.1 G/DL (ref 31.4–37.4)
MCV RBC AUTO: 80 FL (ref 82–98)
MONOCYTES # BLD AUTO: 0.48 THOUSAND/ΜL (ref 0.17–1.22)
MONOCYTES NFR BLD AUTO: 6 % (ref 4–12)
NEUTROPHILS # BLD AUTO: 5.02 THOUSANDS/ΜL (ref 1.85–7.62)
NEUTS SEG NFR BLD AUTO: 56 % (ref 43–75)
NRBC BLD AUTO-RTO: 0 /100 WBCS
P AXIS: 35 DEGREES
PHOSPHATE SERPL-MCNC: 3.4 MG/DL (ref 2.7–4.5)
PLATELET # BLD AUTO: 333 THOUSANDS/UL (ref 149–390)
PMV BLD AUTO: 10.5 FL (ref 8.9–12.7)
POTASSIUM SERPL-SCNC: 3.7 MMOL/L (ref 3.5–5.3)
PR INTERVAL: 146 MS
PROCALCITONIN SERPL-MCNC: <0.05 NG/ML
PROT SERPL-MCNC: 5.9 G/DL (ref 6.4–8.2)
PROTHROMBIN TIME: 13.3 SECONDS (ref 11.6–14.5)
QRS AXIS: 66 DEGREES
QRSD INTERVAL: 88 MS
QT INTERVAL: 418 MS
QTC INTERVAL: 435 MS
RBC # BLD AUTO: 4.38 MILLION/UL (ref 3.81–5.12)
SARS-COV-2 RNA RESP QL NAA+PROBE: NEGATIVE
SODIUM SERPL-SCNC: 136 MMOL/L (ref 136–145)
T WAVE AXIS: 38 DEGREES
TSH SERPL DL<=0.05 MIU/L-ACNC: 2.03 UIU/ML (ref 0.36–3.74)
VENTRICULAR RATE: 65 BPM
WBC # BLD AUTO: 8.7 THOUSAND/UL (ref 4.31–10.16)

## 2021-08-23 PROCEDURE — 84443 ASSAY THYROID STIM HORMONE: CPT | Performed by: INTERNAL MEDICINE

## 2021-08-23 PROCEDURE — 83735 ASSAY OF MAGNESIUM: CPT | Performed by: INTERNAL MEDICINE

## 2021-08-23 PROCEDURE — 84100 ASSAY OF PHOSPHORUS: CPT | Performed by: INTERNAL MEDICINE

## 2021-08-23 PROCEDURE — 82948 REAGENT STRIP/BLOOD GLUCOSE: CPT

## 2021-08-23 PROCEDURE — 99232 SBSQ HOSP IP/OBS MODERATE 35: CPT | Performed by: INTERNAL MEDICINE

## 2021-08-23 PROCEDURE — 94760 N-INVAS EAR/PLS OXIMETRY 1: CPT

## 2021-08-23 PROCEDURE — 85610 PROTHROMBIN TIME: CPT | Performed by: INTERNAL MEDICINE

## 2021-08-23 PROCEDURE — 85730 THROMBOPLASTIN TIME PARTIAL: CPT | Performed by: INTERNAL MEDICINE

## 2021-08-23 PROCEDURE — 84145 PROCALCITONIN (PCT): CPT | Performed by: INTERNAL MEDICINE

## 2021-08-23 PROCEDURE — 93010 ELECTROCARDIOGRAM REPORT: CPT | Performed by: INTERNAL MEDICINE

## 2021-08-23 PROCEDURE — 85730 THROMBOPLASTIN TIME PARTIAL: CPT | Performed by: SURGERY

## 2021-08-23 PROCEDURE — 99222 1ST HOSP IP/OBS MODERATE 55: CPT | Performed by: SURGERY

## 2021-08-23 PROCEDURE — 36415 COLL VENOUS BLD VENIPUNCTURE: CPT | Performed by: INTERNAL MEDICINE

## 2021-08-23 PROCEDURE — 93971 EXTREMITY STUDY: CPT | Performed by: SURGERY

## 2021-08-23 PROCEDURE — 94002 VENT MGMT INPAT INIT DAY: CPT

## 2021-08-23 PROCEDURE — 99222 1ST HOSP IP/OBS MODERATE 55: CPT | Performed by: INTERNAL MEDICINE

## 2021-08-23 PROCEDURE — U0003 INFECTIOUS AGENT DETECTION BY NUCLEIC ACID (DNA OR RNA); SEVERE ACUTE RESPIRATORY SYNDROME CORONAVIRUS 2 (SARS-COV-2) (CORONAVIRUS DISEASE [COVID-19]), AMPLIFIED PROBE TECHNIQUE, MAKING USE OF HIGH THROUGHPUT TECHNOLOGIES AS DESCRIBED BY CMS-2020-01-R: HCPCS | Performed by: INTERNAL MEDICINE

## 2021-08-23 PROCEDURE — 85025 COMPLETE CBC W/AUTO DIFF WBC: CPT | Performed by: INTERNAL MEDICINE

## 2021-08-23 PROCEDURE — U0005 INFEC AGEN DETEC AMPLI PROBE: HCPCS | Performed by: INTERNAL MEDICINE

## 2021-08-23 PROCEDURE — ND001 PR NO DOCUMENTATION: Performed by: PHYSICIAN ASSISTANT

## 2021-08-23 PROCEDURE — 80053 COMPREHEN METABOLIC PANEL: CPT | Performed by: INTERNAL MEDICINE

## 2021-08-23 PROCEDURE — NC001 PR NO CHARGE: Performed by: PHYSICIAN ASSISTANT

## 2021-08-23 RX ORDER — HYDROMORPHONE HCL/PF 1 MG/ML
0.5 SYRINGE (ML) INJECTION EVERY 4 HOURS PRN
Status: DISCONTINUED | OUTPATIENT
Start: 2021-08-23 | End: 2021-08-24

## 2021-08-23 RX ORDER — CLOPIDOGREL BISULFATE 75 MG/1
75 TABLET ORAL DAILY
Status: DISCONTINUED | OUTPATIENT
Start: 2021-08-23 | End: 2021-08-27 | Stop reason: HOSPADM

## 2021-08-23 RX ORDER — INSULIN GLARGINE 100 [IU]/ML
15 INJECTION, SOLUTION SUBCUTANEOUS EVERY MORNING
Status: DISCONTINUED | OUTPATIENT
Start: 2021-08-24 | End: 2021-08-23

## 2021-08-23 RX ORDER — CHLORPROMAZINE HYDROCHLORIDE 25 MG/1
100 TABLET, FILM COATED ORAL
Status: DISCONTINUED | OUTPATIENT
Start: 2021-08-23 | End: 2021-08-27 | Stop reason: HOSPADM

## 2021-08-23 RX ORDER — IPRATROPIUM BROMIDE AND ALBUTEROL SULFATE 2.5; .5 MG/3ML; MG/3ML
3 SOLUTION RESPIRATORY (INHALATION) EVERY 6 HOURS PRN
Status: DISCONTINUED | OUTPATIENT
Start: 2021-08-23 | End: 2021-08-23

## 2021-08-23 RX ORDER — OXYCODONE HYDROCHLORIDE 10 MG/1
10 TABLET ORAL EVERY 4 HOURS PRN
Status: DISCONTINUED | OUTPATIENT
Start: 2021-08-23 | End: 2021-08-24

## 2021-08-23 RX ORDER — CEFAZOLIN SODIUM 2 G/50ML
2000 SOLUTION INTRAVENOUS EVERY 8 HOURS
Status: DISCONTINUED | OUTPATIENT
Start: 2021-08-23 | End: 2021-08-24

## 2021-08-23 RX ORDER — INSULIN GLARGINE 100 [IU]/ML
30 INJECTION, SOLUTION SUBCUTANEOUS EVERY MORNING
Status: DISCONTINUED | OUTPATIENT
Start: 2021-08-24 | End: 2021-08-26

## 2021-08-23 RX ADMIN — INSULIN LISPRO 2 UNITS: 100 INJECTION, SOLUTION INTRAVENOUS; SUBCUTANEOUS at 21:08

## 2021-08-23 RX ADMIN — INSULIN LISPRO 4 UNITS: 100 INJECTION, SOLUTION INTRAVENOUS; SUBCUTANEOUS at 06:53

## 2021-08-23 RX ADMIN — LISINOPRIL 20 MG: 20 TABLET ORAL at 09:52

## 2021-08-23 RX ADMIN — LURASIDONE HYDROCHLORIDE 80 MG: 80 TABLET, FILM COATED ORAL at 14:46

## 2021-08-23 RX ADMIN — DEXTROSE 2000 MG: 50 INJECTION, SOLUTION INTRAVENOUS at 00:19

## 2021-08-23 RX ADMIN — OXYCODONE HYDROCHLORIDE 5 MG: 5 TABLET ORAL at 15:38

## 2021-08-23 RX ADMIN — CEFAZOLIN SODIUM 2000 MG: 2 SOLUTION INTRAVENOUS at 10:47

## 2021-08-23 RX ADMIN — HYDROXYZINE HYDROCHLORIDE 50 MG: 25 TABLET, FILM COATED ORAL at 18:15

## 2021-08-23 RX ADMIN — SODIUM CHLORIDE 125 ML/HR: 0.9 INJECTION, SOLUTION INTRAVENOUS at 10:56

## 2021-08-23 RX ADMIN — OXYCODONE HYDROCHLORIDE 5 MG: 5 TABLET ORAL at 06:20

## 2021-08-23 RX ADMIN — ATORVASTATIN CALCIUM 40 MG: 40 TABLET, FILM COATED ORAL at 18:15

## 2021-08-23 RX ADMIN — QUETIAPINE FUMARATE 300 MG: 100 TABLET ORAL at 09:52

## 2021-08-23 RX ADMIN — CLOPIDOGREL BISULFATE 75 MG: 75 TABLET ORAL at 09:52

## 2021-08-23 RX ADMIN — HEPARIN SODIUM 10.6 UNITS/KG/HR: 10000 INJECTION, SOLUTION INTRAVENOUS at 19:28

## 2021-08-23 RX ADMIN — HYDROMORPHONE HYDROCHLORIDE 0.5 MG: 1 INJECTION, SOLUTION INTRAMUSCULAR; INTRAVENOUS; SUBCUTANEOUS at 10:02

## 2021-08-23 RX ADMIN — HYDROXYZINE HYDROCHLORIDE 50 MG: 25 TABLET, FILM COATED ORAL at 21:08

## 2021-08-23 RX ADMIN — INSULIN GLARGINE 30 UNITS: 100 INJECTION, SOLUTION SUBCUTANEOUS at 09:52

## 2021-08-23 RX ADMIN — HYDROXYZINE HYDROCHLORIDE 50 MG: 25 TABLET, FILM COATED ORAL at 00:02

## 2021-08-23 RX ADMIN — OXYCODONE HYDROCHLORIDE 5 MG: 5 TABLET ORAL at 11:18

## 2021-08-23 RX ADMIN — SODIUM CHLORIDE 125 ML/HR: 0.9 INJECTION, SOLUTION INTRAVENOUS at 20:22

## 2021-08-23 RX ADMIN — TRAZODONE HYDROCHLORIDE 200 MG: 100 TABLET ORAL at 00:05

## 2021-08-23 RX ADMIN — HYDROXYZINE HYDROCHLORIDE 50 MG: 25 TABLET, FILM COATED ORAL at 09:52

## 2021-08-23 RX ADMIN — SODIUM CHLORIDE 125 ML/HR: 0.9 INJECTION, SOLUTION INTRAVENOUS at 00:26

## 2021-08-23 RX ADMIN — INSULIN LISPRO 4 UNITS: 100 INJECTION, SOLUTION INTRAVENOUS; SUBCUTANEOUS at 18:14

## 2021-08-23 RX ADMIN — PHENYTOIN 1 MG: 125 SUSPENSION ORAL at 00:02

## 2021-08-23 RX ADMIN — CEFAZOLIN SODIUM 2000 MG: 2 SOLUTION INTRAVENOUS at 18:15

## 2021-08-23 RX ADMIN — OXYCODONE HYDROCHLORIDE 5 MG: 5 TABLET ORAL at 21:08

## 2021-08-23 RX ADMIN — NICOTINE 1 PATCH: 21 PATCH, EXTENDED RELEASE TRANSDERMAL at 09:52

## 2021-08-23 RX ADMIN — VANCOMYCIN HYDROCHLORIDE 750 MG: 750 INJECTION, SOLUTION INTRAVENOUS at 00:54

## 2021-08-23 RX ADMIN — INSULIN LISPRO 4 UNITS: 100 INJECTION, SOLUTION INTRAVENOUS; SUBCUTANEOUS at 12:55

## 2021-08-23 RX ADMIN — HYDROMORPHONE HYDROCHLORIDE 0.5 MG: 1 INJECTION, SOLUTION INTRAMUSCULAR; INTRAVENOUS; SUBCUTANEOUS at 06:20

## 2021-08-23 RX ADMIN — PHENYTOIN 1 MG: 125 SUSPENSION ORAL at 21:09

## 2021-08-23 RX ADMIN — INSULIN LISPRO 5 UNITS: 100 INJECTION, SOLUTION INTRAVENOUS; SUBCUTANEOUS at 01:03

## 2021-08-23 RX ADMIN — DOCUSATE SODIUM 100 MG: 100 CAPSULE, LIQUID FILLED ORAL at 00:05

## 2021-08-23 RX ADMIN — HYDROMORPHONE HYDROCHLORIDE 0.5 MG: 1 INJECTION, SOLUTION INTRAMUSCULAR; INTRAVENOUS; SUBCUTANEOUS at 00:07

## 2021-08-23 RX ADMIN — OXYCODONE HYDROCHLORIDE 5 MG: 5 TABLET ORAL at 00:07

## 2021-08-23 NOTE — ASSESSMENT & PLAN NOTE
Patient has warmth of the anterior knee with note of fluid collection at the size of a dollar coin  Most likely is a bursitis but may be infected due to the presence of warmth  CT scan of the knee is ordered  If bursitis is localized, possibly surgery do incision and drainage? If whole knee is involved, then consider orthopedic consult instead  Blood cultures x2  Nasal MRSA culture  Will also get procalcitonin in addition to usual CBC and metabolic profile  Started on Rocephin with vancomycin considering that region involved is near the knee joint and we need to be aggressive with antibiosis  Pain control:  Oxycodone 5 mg every 4 hours for moderate pain and Dilaudid 0 5 mg every hour as needed for severe pain

## 2021-08-23 NOTE — ASSESSMENT & PLAN NOTE
Lab Results   Component Value Date    HGBA1C 8 9 (A) 11/24/2020   NPO post midnight; on Accu-Cheks per protocol    Hold Trulicity and metformin Syncope, unspecified syncope type

## 2021-08-23 NOTE — H&P
[address] [facility]  H&P- Blaine Clark 1985, 30 y o  female MRN: [id number]  Unit/Bed#: ED 14 Encounter: [id number]  Primary Care Provider: Shaina Abrams DO   Date and time admitted to hospital: 8/22/2021  8:58 PM    Bursitis of left knee  Assessment & Plan  Patient has warmth of the anterior knee with note of fluid collection at the size of a dollar coin  Most likely is a bursitis but may be infected due to the presence of warmth  CT scan of the knee is ordered  If bursitis is localized, possibly surgery do incision and drainage? If whole knee is involved, then consider orthopedic consult instead  Blood cultures x2  Nasal MRSA culture  Will also get procalcitonin in addition to usual CBC and metabolic profile  Started on Rocephin with vancomycin considering that region involved is near the knee joint and we need to be aggressive with antibiosis  Pain control:  Oxycodone 5 mg every 4 hours for moderate pain and Dilaudid 0 5 mg every hour as needed for severe pain  Superficial femoral artery occlusion [facility])  Assessment & Plan  Recently had left-sided superficial femoral artery stenting and comes back with the pain of the knee however also found to have occlusion of the stent  Consulted vascular surgery  Currently on heparin drip  They consulted Interventional Radiology as well for angiogram and possible lysis versus thrombectomy  [facility]  Assessment & Plan  Currently on Desyrel 200 mg HS  Quetiapine 300 mg daily  Nicotine patch 21  Latuda 20 mg daily  Atarax and Thorazine  Nicotine dependence  Assessment & Plan  Continue nicotine replacement  COPD (chronic obstructive pulmonary disease) ([facility] )  Assessment & Plan  Currently without shortness of breath      Type 2 diabetes mellitus with diabetic polyneuropathy, with long-term current use of insulin [facility])  Assessment & Plan    Lab Results   Component Value Date    HGBA1C 8 9 (A) 11/24/2020 NPO post midnight; on Accu-Cheks per protocol  Hold Trulicity and metformin    Hypertension  Assessment & Plan  On prazosin 1 mg HS  Zestril 10 mg daily        VTE Prophylaxis: Heparin Drip  / sequential compression device   Code Status: Level 1 - Full Code as discussed  POLST: There is no POLST form on file for this patient (pre-hospital)    Anticipated Length of Stay:  Patient will be admitted on an Emergency inpatient  basis with an anticipated length of stay of  greater than 2 midnights  Justification for Hospital Stay: Please see detailed plans noted above  Chief Complaint:     Left knee pain  History of Present Illness:  Marychuy Barksdale is a 30 y o  female who has past medical history significant for morbid obesity,, hyperlipidemia, gastroesophageal reflux disease, hypertension, and nicotine use who recently had an admission for occlusion of the left superficial femoral artery status post stenting  She was doing quite well at home and reportedly she did not fall nor was she on her knees however the past 24 hours she noted that there is increasing pain at the superficial knee area with note of warmth  By palpation, there is somewhat a dollar coin sized collection of fluid superficially  which is tender to the touch  It also has warmth  The patient went to the emergency room of [facility]  A vascular ultrasound showed the occlusion of the newly placed stent and therefore patient was transferred to [facility] for further management with vascular  He was then found out that the patient's left knee superficial swelling and assessed also by vascular that it has nothing to do with the occlusion of the stent itself  With that, the patient was then transferred to [facility] due to the primary problem of the left knee pain and its potential for any infection      Currently, patient is lying flat on bed and is relatively comfortable however with pain when palpated at the left knee specifically at the collection of fluid superficially  Otherwise, the patient denies any fever or cough or cold  She denies any trauma to the knees  She has not been on her knees lately  She also does not have any complaints of nausea vomiting or diarrhea  No rashes or open sores  Review of Systems:    Constitutional:  Denies fever or chills   Eyes:  Denies change in visual acuity   HENT:  Denies nasal congestion or sore throat   Respiratory:  Denies cough or shortness of breath   Cardiovascular:  Denies chest pain or edema   GI:  Denies abdominal pain, nausea, vomiting, bloody stools or diarrhea   :  Denies dysuria   Musculoskeletal:  Denies back pain joint pain at the left knee  Also warmth that region    Integument:  Denies rash   Neurologic:  Denies headache, focal weakness or sensory changes   Endocrine:  Denies polyuria or polydipsia   Lymphatic:  Denies swollen glands   Psychiatric:  Denies depression or anxiety     Past Medical and Surgical History:   Past Medical History:   Diagnosis Date    Asthma     Bipolar 1 disorder ([place] [address] )     COPD (chronic obstructive pulmonary disease) ([place] [address] )     Depression     Diabetes mellitus ([place] [address] )     Hypertension     Psychiatric disorder     cutting history    PTSD (post-traumatic stress disorder)     Tendonitis      Past Surgical History:   Procedure Laterality Date     SECTION      EAR SURGERY      IR LOWER EXTREMITY ANGIOGRAM  2021       Meds/Allergies:  (Not in a hospital admission)      Allergies: No Known Allergies  History:  Marital Status: /Civil Union   Occupation:  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  Patient Pre-hospital Living Situation:  Lives at home with wife  Patient Pre-hospital Level of Mobility:  Ambulatory  Patient Pre-hospital Diet Restrictions:  Regular diabetic cardiac  Substance Use History:   Social History     Substance and Sexual Activity   Alcohol Use Not Currently     Social History     Tobacco Use   Smoking Status Current Every Day Smoker    Packs/day: 2 00    Years: 17 00    Pack years: 34 00    Types: Cigarettes   Smokeless Tobacco Never Used   Tobacco Comment    pt is down to 4 cigarettes a day      Social History     Substance and Sexual Activity   Drug Use Not Currently    Types: Cocaine, Marijuana    Comment: 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       Family History:  Family History   Problem Relation Age of Onset    Hypertension Mother     Diabetes Mother     HIV Mother     Heart disease Mother     No Known Problems Father        Physical Exam:     Vitals:   Blood Pressure: 170/98 (08/22/21 2106)  Pulse: 68 (08/22/21 2106)  Temperature: 98 2 °F (36 8 °C) (08/22/21 2106)  Temp Source: Oral (08/22/21 2106)  Respirations: 18 (08/22/21 2106)  SpO2: 99 % (08/22/21 2106)    Constitutional:  Well developed, morbidly obese no acute distress, non-toxic appearance and very gregarious  Eyes:  PERRL, conjunctiva normal   HENT:  Atraumatic, external ears normal, nose normal, oropharynx moist, no pharyngeal exudates  Neck- normal range of motion, no tenderness, supple   Respiratory:  No respiratory distress, normal breath sounds, no rales, no wheezing   Cardiovascular:  Normal rate, normal rhythm, no murmurs, no gallops, no rubs   GI:  Soft, nondistended, normal bowel sounds, nontender, no organomegaly, no mass, no rebound, no guarding   :  No costovertebral angle tenderness   Musculoskeletal:  Swelling of the superficial region of the left knee with a fluid collection amounting to approximately a dollar coin, tenderness when palpated, warm to touch, no purulence currently seen    Back- no tenderness  Integument:  Well hydrated, no rash   Lymphatic:  No lymphadenopathy noted   Neurologic:  Alert &awake, communicative, CN 2-12 normal, normal motor function, normal sensory function, no focal deficits noted   Psychiatric:  Speech and behavior appropriate Lab Results: I have personally reviewed pertinent reports  Results from last 7 days   Lab Units 08/22/21  1518   WBC Thousand/uL 8 06   HEMOGLOBIN g/dL 14 9   HEMATOCRIT % 40 5   PLATELETS Thousands/uL 402*   NEUTROS PCT % 72   LYMPHS PCT % 20   MONOS PCT % 5   EOS PCT % 3     Results from last 7 days   Lab Units 08/22/21  1518   POTASSIUM mmol/L 3 4*   CHLORIDE mmol/L 100   CO2 mmol/L 26   BUN mg/dL 14   CREATININE mg/dL 0 65   CALCIUM mg/dL 9 1     Results from last 7 days   Lab Units 08/22/21  1855   INR  0 91           Imaging: I have personally reviewed pertinent reports  IR lower extremity angiogram    Result Date: 8/11/2021  Narrative: PROCEDURE: Ultrasound-guided right femoral artery access  Abdominal aortogram and pelvic arteriogram  Left lower extremity arteriogram  Left superficial femoral artery balloon angioplasty and stenting x2  Post stent placement left lower extremity angiogram  Right femoral artery sheathogram  Right femoral artery vascular closure device  STAFF: RALPH Jones  CONTRAST: 105 mL Visipaque 320  FLUOROSCOPY TIME: 27 9 minutes  NUMBER OF IMAGES: Multiple  COMPLICATIONS: None  MEDICATIONS: 1% lidocaine Sedation was provided by the anesthesia service  INDICATION: Nonhealing left 5th digit ulceration  PROCEDURE: Real-time ultrasound guidance was used to access the right common femoral artery  An image was stored in PACS  A sheath was placed  A catheter was extended to the level of the renal arteries, and aortography and pelvic arteriography in multiple obliquities was performed  A wire and catheter combination was then extended into the left common femoral artery, and further arteriography of the left lower extremity was performed  Marked disease of the proximal and mid left superficial femoral artery with proximal stenosis and approximately 6 cm mid left SFA complete occlusion  There is distal reconstitution of the mid superficial femoral artery   The posterior tibial artery and peroneal artery supply two-vessel runoff to the foot  Short segment moderate disease in the mid/distal peroneal artery  The proximal anterior tibial artery is patent, however becomes occluded at the proximal/mid artery  There is no distal reconstitution of the anterior tibial artery  The short 5-Occitan vascular sheath was exchanged for a 6-Occitan by 65 cm destination sheath with the tip placed in the left common femoral artery  A 4-Occitan glide catheter and 035 glide advantage wire were advanced through the vascular sheath and multiple attempts to cross the occluded left superficial femoral artery were unsuccessful  A 014 glide advantage wire and Viance crossing catheter were advanced to the level of the occlusion  The catheter and wire combination were successfully advanced through the occlusion and into the distal left superficial femoral artery  Contrast injected through the catheter confirmed intraluminal position  Next the crossing catheter was removed and the 4-Occitan glide catheter was advanced over the 014 wire  The 014 wire was exchanged for a 035 wire  A 4 x 200 mm  balloon was advanced over the wire and across the occlusion  Balloon angioplasty was performed for approximately 120 seconds  Post angioplasty arteriogram demonstrated improved luminal gain with multiple short segment dissections throughout the left superficial femoral artery  Next 2, 6 x 120 mm Viola stents were deployed in the proximal and mid left superficial femoral artery  The stents were dilated with a 4 mm  balloon  Post stent deployment angiography demonstrated a patent left superficial femoral artery with markedly improved luminal gain and improved flow  Two-vessel runoff was again demonstrated with persistent moderate short segment disease in the mid/distal peroneal artery    The decision to not treat the peroneal artery was made at this time as the patient was coughing and unable to hold her lower extremity still  At this point the long 6-Kazakh vascular sheath was exchanged for a short 6-Kazakh vascular sheath  A Mynx vascular closure device was deployed successfully  A sterile dressing was applied  FINDINGS: 1   Real-time ultrasound guidance showed the right common femoral artery to be anechoic and freely compressible  2   Aortography above the level of the renal arteries showed single renal arteries bilaterally  3   Arteriography of the left lower extremity showed the left common and external iliac arteries to have no significant stenosis  Visualized branches of the internal iliac artery were normal   The common femoral artery had no significant stenosis  Visualized branches of the profunda artery had no significant stenosis  The superficial femoral artery has greater than 50% stenosis of the proximal artery with approximately 6 cm complete occlusion of the mid artery  There is distal reconstitution of the mid superficial femoral artery  The popliteal artery is patent  In-line flow to the foot was provided by the posterior tibial artery and peroneal artery  Impression: 1  Technically successful crossing of 6 cm complete occlusion of the left superficial femoral artery followed by placement of 2, 6 x 120 mm Viola stents  2   Moderate short segment disease of the mid/distal peroneal artery which was not treated today as the patient was having difficulty holding still secondary to continued coughing  PLAN: The patient was loaded with 300 mg Plavix  A prescription for 75 mg Plavix to be taken daily was also written for the patient  The patient was instructed to begin taking the 75 mg of Plavix tomorrow  Workstation performed: [id number]     VAS lower limb venous duplex study, unilateral/limited    Result Date: 8/22/2021  Narrative:  [facility] REPORT CLINICAL: Indications: Patient presents with left lower extremity pain x 7 days   Operative History: No cardiovascular surgeries Risk Factors The patient has history of HTN and Diabetes (IDDM)  FINDINGS:  Left     Impression       CFV      Normal (Patent)     CONCLUSION: RIGHT LOWER LIMB LIMITED: Evaluation shows no evidence of thrombus in the common femoral vein  Doppler evaluation shows a normal response to augmentation maneuvers  LEFT LOWER LIMB: No evidence of acute or chronic deep vein thrombosis No evidence of superficial thrombophlebitis noted  Doppler evaluation shows a normal response to augmentation maneuvers  Popliteal, posterior tibial and anterior tibial arterial Doppler waveforms are monophasic  Technical findings were given to Dr VASQUEZ [facility] 17:20        ** Please Note: Dragon 360 Dictation voice to text software was used in the creation of this document   **

## 2021-08-23 NOTE — ASSESSMENT & PLAN NOTE
Recently had left-sided superficial femoral artery stenting and comes back with the pain of the knee however also found to have occlusion of the stent  Consulted vascular surgery  Currently on heparin drip  They consulted Interventional Radiology as well for angiogram and possible lysis versus thrombectomy

## 2021-08-23 NOTE — TELEMEDICINE
e-Consult ([facility])  - Interventional Radiology  Randell Fry 30 y o  female MRN: [id number]  Unit/Bed#: -01 Encounter: [id number]    IR has been consulted to evaluate the patient, determine the appropriate procedure, and whether or not a procedure can and should be performed regarding the care of Randell Fry  We were consulted by vascular surgery concerning LLE SFA stent occlusion, and to possibly perform a LLE agram with possible intervention if medically appropriate for the patient  IP Consult to IR  Consult performed by: Thomas Nguyen PA-C  Consult ordered by: Parke Holter, MD        08/23/21      Assessment/Recommendation:     30year old female with pmh of DM, COPD, TIA, left SFA stent placement 8/11/21, presenting with left knee pain, redness, purulent drainage  Found to have left SFA stent occlusion and popliteal stenosis  - will plan for LLE agram with possible intervention Wednesday, 8/25/21  - please keep npo after midnight Tuesday into Wednesday      Total time spent in review of data, discussion with requesting provider and rendering advice was 25 minutes       Patient or appropriate family member was verbally informed by vascular surgery of this consultative service on their behalf to provide more timely access to specialty care in lieu of an in person consultation  Verbal consent was obtained  Thank you for allowing Interventional Radiology to participate in the care of Randell Fry  Please don't hesitate to call or TigerText us with any questions       Thomas Nguyen PA-C

## 2021-08-23 NOTE — CONSULTS
Consultation - Pulmonary Medicine   Jeanine Storm 30 y o  female MRN: [id number]  Unit/Bed#: -01 Encounter: [id number]      Physician Requesting Consult: GAVINO Terrazas  Reason for Consult:  Copd and perioperative assessment    Assessment/Plan:    80-year-old woman with significant tobacco history, possible underlying COPD admitted with leg pain in the setting of previous left SFA stent placement on August 11, 2021       1   COPD-  Is unclear whether not patient has underlying COPD  No pulmonary function test on file  She is not prescribed any inhalers at home  There is no current evidence of an exacerbation  2  Obstructive sleep apnea      Recommendations   1  Pain management per primary service but will need to closely watch patient's mental status/ somnolence   2  Have ordered CPAP for sleep   3  Have ordered p r n  bronchodilators  4  In regards to vincent operative/anesthesia  Recommendations: would continue bronchodilators, may benefit from noninvasive ventilation strategy post operatively   If she does have respiratory distress     Does not require IV or oral steroids at this time  5    Would likely benefit from outpatient pulmonary follow-up, PFTs and further at assessment     ______________________________________________________________________    HPI:    Jeanine Storm is a 30 y o  female with a possible history of COPD (no PFTs on file), significant tobacco history (  1 5 packs per day), diabetes, obesity who underwent left SFA stent placement on August 11, 2021  Patient presented back to the hospital due to left knee pain, redness and purulent drainage  She was found to have left SFA stent occlusion and popliteal stenosis  Pulmonary service is consulted for recommendations regarding vincent pulmonary management for pending left lower extremity arteriogram on 08/25/2021  When I assessed the patient this afternoon she was very sleepy    She is receiving pain medications for her lower extremity pain  However she is easily redirected/arroused  She reported no respiratory issues  She states that she does smoke but has never been formally diagnosed with respiratory issues  She does not follow with a pulmonologist   She has never undergone pulmonary function test   She does have obstructive sleep apnea and uses a CPAP which she left home  Patient denies any other symptoms the than aforementioned  PFT results:    None on file    Review of Systems:    Full 12 point review of systems was performed  Aside from what was mentioned in the HPI, it is otherwise negative  Historical Information   Past Medical History:   Diagnosis Date    Asthma     Bipolar 1 disorder ([facility] )     COPD (chronic obstructive pulmonary disease) ([facility] )     Depression     Diabetes mellitus (HCC)     Hypertension     Psychiatric disorder     cutting history    PTSD (post-traumatic stress disorder)     Tendonitis      Past Surgical History:   Procedure Laterality Date     SECTION      EAR SURGERY      IR LOWER EXTREMITY ANGIOGRAM  2021     Social History   Social History     Substance and Sexual Activity   Alcohol Use Not Currently     Social History     Tobacco Use   Smoking Status Current Every Day Smoker    Packs/day: 2 00    Years: 17 00    Pack years: 34 00    Types: Cigarettes   Smokeless Tobacco Never Used   Tobacco Comment    pt is down to 4 cigarettes a day        Occupational history:    Patient states that she does not work  Family History:   Family History   Problem Relation Age of Onset    Hypertension Mother     Diabetes Mother    Leoncio HIV Mother     Heart disease Mother     No Known Problems Father        Medications: The patient's active and prehospital medications were reviewed    Current Facility-Administered Medications   Medication Dose Route Frequency Provider Last Rate    acetaminophen  650 mg Oral Q6H PRN MD Leoncio Cervantes aluminum-magnesium hydroxide-simethicone  30 mL Oral Q6H PRN Wayne Massey MD      atorvastatin  40 mg Oral Daily With Josy Pierre MD      cefazolin  2,000 mg Intravenous Q8H Valorie Barclay, DO 2,000 mg (08/23/21 1047)    chlorproMAZINE  100 mg Oral HS Wayne Massey MD      clopidogrel  75 mg Oral Daily Scot Sanz MD      docusate sodium  100 mg Oral BID PRN Wayne Massey MD      heparin (porcine)  3-30 Units/kg/hr (Order-Specific) Intravenous Titrated Wayne Massey MD 10 6 Units/kg/hr (08/23/21 1723)    heparin (porcine)  3,800 Units Intravenous Q1H PRN Wayne Massey MD      heparin (porcine)  7,600 Units Intravenous Q1H PRN Wayne Massey MD      HYDROmorphone  0 5 mg Intravenous Q4H PRN Valorie Barclay,       hydrOXYzine HCL  50 mg Oral TID Wayne Massey MD      insulin glargine  30 Units Subcutaneous QAM Wayne Massey MD      insulin lispro  1-5 Units Subcutaneous HS Aaliyah Agudelo, DO      insulin lispro  2-12 Units Subcutaneous TID AC Aaliyah Robles, DO      lamoTRIgine  25 mg Oral Daily Wanye Massey MD      lurasidone  80 mg Oral Daily With Breakfast Valorie Barclay, DO      nicotine  1 patch Transdermal Daily Wayne Massey MD      ondansetron  4 mg Intravenous Q6H PRN Wayne Massey MD      oxyCODONE  10 mg Oral Q4H PRN Valorie Barclay, DO      oxyCODONE  5 mg Oral Q4H PRN Wayne Massey MD      prazosin  1 mg Oral HS Wayne Massey MD      QUEtiapine  300 mg Oral Daily Wayne Massey MD      sodium chloride  125 mL/hr Intravenous Continuous Wayne Massey  mL/hr (08/23/21 1056)    traZODone  200 mg Oral HS Wayne Massey MD           PhysicalExamination:  Vitals:   Vitals:    08/23/21 0630 08/23/21 0700 08/23/21 0801 08/23/21 1519   BP: 158/70 142/65 166/95 143/78   BP Location: Right arm      Pulse: 76 70 72 72   Resp: 18 16 20 14   Temp:   98 °F (36 7 °C) 97 9 °F (36 6 °C)   TempSrc:   Oral    SpO2: 98% 99% 98% 97%   Weight:   98 6 kg (217 lb 6 oz)    Height:   5' 1" (1 549 m)      Body mass index is 41 07 kg/m²    Temp  Min: 97 9 °F (36 6 °C)  Max: 98 5 °F (36 9 °C)  IBW (Ideal Body Weight): 47 8 kg    SpO2: 97 %,   SpO2 Activity: At Rest,   O2 Device: None (Room air)   /78   Pulse 72   Temp 97 9 °F (36 6 °C)   Resp 14   Ht 5' 1" (1 549 m)   Wt 98 6 kg (217 lb 6 oz)   LMP 08/22/2021   SpO2 97%   BMI 41 07 kg/m²     General Appearance:    Alert, cooperative, no distress, appears stated age   Head:    Normocephalic, without obvious abnormality, atraumatic   Eyes:    PERRL, conjunctiva/corneas clear, EOM's intact, fundi     benign, both eyes   Ears:    Normal TM's and external ear canals, both ears   Nose:   Nares normal, septum midline, mucosa normal, no drainage     or sinus tenderness   Throat:   Lips, mucosa, and tongue normal; teeth and gums normal   Neck:   Supple, symmetrical, trachea midline, no adenopathy;     thyroid:  no enlargement/tenderness/nodules; no carotid    bruit or JVD       Lungs:     Clear to auscultation bilaterally, respirations unlabored        Heart:    Regular rate and rhythm, S1 and S2 normal, no murmur, rub    or gallop       Abdomen:     Soft, non-tender, bowel sounds active all four quadrants,     no masses, no organomegaly           Extremities:   Extremities normal, atraumatic, no cyanosis or edema   Pulses:   2+ and symmetric all extremities   Skin:   Skin color, texture, turgor normal, no rashes or lesions   Lymph nodes:   Cervical, supraclavicular, and axillary nodes normal   Neurologic:   CNII-XII intact, normal strength, sensation and reflexes     throughout       Diagnostic Data:  CBC:   Results from last 7 days   Lab Units 08/23/21  0616 08/22/21  1518   WBC Thousand/uL 8 70 8 06   HEMOGLOBIN g/dL 12 6 14 9   HEMATOCRIT % 34 9 40 5   PLATELETS Thousands/uL 333 402*       CMP:   Results from last 7 days   Lab Units 08/23/21  0616 08/22/21  1518   SODIUM mmol/L 136 135   POTASSIUM mmol/L 3 7 3 4*   CHLORIDE mmol/L 105 100   CO2 mmol/L 27 26   BUN mg/dL 17 14   CREATININE mg/dL 0 57* 0 65   CALCIUM mg/dL 7 9* 9 1   ALK PHOS U/L 92  --    ALT U/L 15  --    AST U/L 5  --        Microbiology:  Results from last 7 days   Lab Units 08/22/21  2309 08/22/21  1552 08/22/21  1518   BLOOD CULTURE  Received in Microbiology Lab  Culture in Progress  Received in Microbiology Lab  Culture in Progress  Received in Microbiology Lab  Culture in Progress  No Growth at 24 hrs  ABG: No results found for: PHART, YYX4NEL, PO2ART, LBX7PIS, B9ZNBKLJ, BEART, SOURCE    Imaging:      Chest x-ray August 23, 2021    Impression:       No acute cardiopulmonary disease          Cardiac lab/EKG/telemetry/ECHO:   Results from last 7 days   Lab Units 08/22/21  1518   TROPONIN I ng/mL <0 03         Kian Kong MD

## 2021-08-23 NOTE — ASSESSMENT & PLAN NOTE
35F with PMH significant for DM, COPD, HTN, h/o TIA, Bipolar with nonhealing left 5th toe ulcer s/p LLE angiogram with L SFA stent placement on 8/11/21 (primary runoff PT and peroneal) presenting with primary complaints of left knee pain, redness and purulent drainage  Work-up included arterial duplex with evidence of SFA stent occlusion and popliteal stenosis  Her primary complaints are concerning for either superficial abscess or underlying infectious process of the knee  This is unrelated to SFA stent occlusion and currently has no evidence of acute limb ischemia requiring emergent intervention at this time but will require angiogram at some point      Plan:  -Continue Plavix, hep gtt, statin  [email or web address]   -IR consult for angiogram tomorrow 8/25   -Cleared by pulmonology for anesthesia  -Continue antibiotics per primary team  -Vascular surgery following

## 2021-08-23 NOTE — ED NOTES
Paperwork and report provided to EMS crew and patient prepared for transport to [facility]       Munir Canales RN  08/22/21 2022

## 2021-08-23 NOTE — CONSULTS
Please see Vascular consult as completed by Mary Segovia MD- Vascular fellow on 8/22/2021      GAVINO Gamboa-XIN  8/23/2021

## 2021-08-23 NOTE — ASSESSMENT & PLAN NOTE
Currently on Desyrel 200 mg HS  Quetiapine 300 mg daily  Nicotine patch 21  Latuda 20 mg daily  Atarax and Thorazine

## 2021-08-23 NOTE — PROGRESS NOTES
Vancomycin Assessment    Bailee Suarez is a 30 y o  female who is currently receiving vancomycin 750mg IV q8h for skin-soft tissue infection     Relevant clinical data and objective history reviewed:  Creatinine   Date Value Ref Range Status   08/22/2021 0 65 0 40 - 1 10 mg/dL Final     Comment:     Standardized to IDMS reference method   05/25/2021 0 79 0 60 - 1 30 mg/dL Final     Comment:     Standardized to IDMS reference method   09/22/2020 0 72 0 40 - 1 10 mg/dL Final     Comment:     Standardized to IDMS reference method     BP (!) 255/116 (BP Location: Left arm)   Pulse 75   Temp 98 2 °F (36 8 °C) (Oral)   Resp 18   LMP 08/22/2021   SpO2 98%   No intake/output data recorded  Lab Results   Component Value Date/Time    BUN 14 08/22/2021 03:18 PM    WBC 8 06 08/22/2021 03:18 PM    HGB 14 9 08/22/2021 03:18 PM    HCT 40 5 08/22/2021 03:18 PM    MCV 77 (L) 08/22/2021 03:18 PM     (H) 08/22/2021 03:18 PM     Temp Readings from Last 3 Encounters:   08/22/21 98 2 °F (36 8 °C) (Oral)   08/22/21 98 5 °F (36 9 °C) (Oral)   08/11/21 98 3 °F (36 8 °C) (Oral)     Vancomycin Days of Therapy: 1    Assessment/Plan  The patient is currently on vancomycin utilizing scheduled dosing based on adjusted body weight (due to obesity)  Baseline risks associated with therapy include:  n/a   The patient is currently receiving 750mg IV q8h and is clinically appropriate and dose will be continued  Pharmacy will also follow closely for s/sx of nephrotoxicity, infusion reactions, and appropriateness of therapy  BMP and CBC will be ordered per protocol  Plan for trough as patient approaches steady state, prior to the 5th  dose at approximately 0830 on 8/24  Due to infection severity, will target a trough of 10-15 (mild infection/cellulitis)   Pharmacy will continue to follow the patients culture results and clinical progress daily      Madeline Cisnerosper, Pharmacist

## 2021-08-23 NOTE — CONSULTS
Consultation - Vascular Surgery   Mikel Diana 30 y o  female MRN: [id number]  Unit/Bed#: ED 14 Encounter: [id number]      Atherosclerosis 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 [facility])  Assessment & Plan  35F with PMH significant for DM, COPD, HTN, h/o TIA, Bipolar with nonhealing left 5th toe ulcer s/p LLE angiogram with L SFA stent placement on 8/11/21 (primary runoff PT and peroneal) presenting with primary complaints of left knee pain, redness and purulent drainage  Work-up included arterial duplex with evidence of SFA stent occlusion and popliteal stenosis  Her primary complaints are concerning for either superficial abscess or underlying infectious process of the knee  This is unrelated to SFA stent occlusion and currently has no evidence of acute limb ischemia requiring emergent intervention at this time but will require angiogram at some point  Plan:  -Continue Plavix, hep gtt, statin  -[email or web address]  -IR consult for angiogram, timing to be determined  -She has history of inability to lie flat due to shortness of breath - was evaluated by pulmonology and cleared by pulmonology prior to previous angiogram  However, she was unable to keep leg still during the procedure and may require general anesthesia  -Podiatry for left foot wound  -Further workup for left knee swelling and drainage per primary team  -Vascular surgery following    History of Present Illness   Physician Requesting Consult: No att  providers found  Reason for Consult / Principal Problem: L SFA stent occlusion    HPI: Mikel Diana is a 30y o  year old female who initially presented to [facility] ED due to primary complaints of left knee pain, redness and purulent drainage  She was transferred to  ED due to concerns of left SFA stent occlusion   She has PMH significant for DM, COPD, HTN, h/o TIA, Bipolar with nonhealing left 5th toe ulcer and recently underwent LLE angiogram with L SFA stent placement on 21 (primary runoff PT and peroneal)  She reports that 2-3 days after the procedure she noted a pustule on her left knee with purulent drainage and increasing pain around the knee  She also reports having chest pain and thinks its her anxiety from the knee pain  As far as ischemic symptoms, she still reports claudication symptoms with ambulation of her left calf and foot that is unchanged from prior to the procedure  She has baseline neuropathy of bilateral lower extremities L > R that is unchanged  Denies any loss of motor function  In the ED, she is not tachycardic, hypertensive to 190s/90s, on room air  Labs without leukocytosis  Cr 0 65/NIg720  First set of troponin negative  No EKG available  LLE arterial duplex demonstrated occluded SFA stent with 50-75% stenosis of popliteal artery  Venous duplex no DVT  She was started on heparin gtt and transferred for further management  Inpatient consult to Vascular Surgery  Consult performed by: Angelina Dandy, MD  Consult ordered by: Angelina Dandy, MD          Review of Systems   All other systems reviewed and are negative        Historical Information   Past Medical History:   Diagnosis Date    Asthma     Bipolar 1 disorder ([facility] )     [facility]     Depression     Diabetes mellitus ([facility] )     Hypertension     Psychiatric disorder     cutting history    PTSD (post-traumatic stress disorder)     Tendonitis      Past Surgical History:   Procedure Laterality Date     SECTION      EAR SURGERY      IR LOWER EXTREMITY ANGIOGRAM  2021     Social History   Social History     Substance and Sexual Activity   Alcohol Use Not Currently     Social History     Substance and Sexual Activity   Drug Use Not Currently    Types: Cocaine, Marijuana    Comment: 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     E-Cigarette/Vaping    E-Cigarette Use Never User      E-Cigarette/Vaping Substances    Nicotine No     THC No     CBD No  Flavoring No     Other No     Unknown No      Social History     Tobacco Use   Smoking Status Current Every Day Smoker    Packs/day: 2 00    Years: 17 00    Pack years: 34 00    Types: Cigarettes   Smokeless Tobacco Never Used   Tobacco Comment    pt is down to 4 cigarettes a day      Family History: non-contributory}    Meds/Allergies   all current active meds have been reviewed  No Known Allergies    Objective   Vitals: Blood pressure 170/98, pulse 68, temperature 98 2 °F (36 8 °C), temperature source Oral, resp  rate 18, last menstrual period 08/22/2021, SpO2 99 %, not currently breastfeeding  ,There is no height or weight on file to calculate BMI  No intake or output data in the 24 hours ending 08/22/21 2214  Invasive Devices     Peripheral Intravenous Line            Peripheral IV 08/22/21 Right Antecubital <1 day                Physical Exam  Vitals reviewed  HENT:      Head: Normocephalic  Nose: Nose normal    Eyes:      Extraocular Movements: Extraocular movements intact  Cardiovascular:      Rate and Rhythm: Normal rate  Comments: Bilateral palpable femoral pulses  Bilateral feet warm  Right PT signal, no DP signal  Left palpable DP and PT pulses  Pulmonary:      Effort: Pulmonary effort is normal    Abdominal:      Palpations: Abdomen is soft  Musculoskeletal:         General: No swelling  Cervical back: Neck supple  Skin:     General: Skin is warm  Comments: Cellulitis of left skin overlying left patella with pustule that looks like it previously spontaneously drained  Tenderness of left knee  Left 5th toe wound    Neurological:      Mental Status: She is alert and oriented to person, place, and time  Comments: Baseline neuropathy of the foot, difficult to get accurate exam but per patient she has worse neuropathy on the L>R but reports its at baseline  Has sensation of bilateral feet but less discrimination on the left   Motor able to move toes and dorsi/plantar flex ankle bilaterally  Lab Results: I have personally reviewed pertinent reports  Imaging Studies: I have personally reviewed pertinent reports  EKG, Pathology, and Other Studies: I have personally reviewed pertinent reports      VTE Prophylaxis: Heparin

## 2021-08-23 NOTE — CONSULTS
Vancomycin IV Pharmacy-to-Dose Consultation    Jaki Dubois is a 30 y o  female who was receiving Vancomycin IV with management by the Pharmacy Consult service for treatment of skin-soft tissue infection    The patients Vancomycin therapy has been completed / discontinued  Thank you for allowing us to take part in this patient's care  Pharmacy will sign-off now; please call or re-consult if there are any questions      Ramon Waldrop  Staff Pharmacist

## 2021-08-24 ENCOUNTER — APPOINTMENT (INPATIENT)
Dept: RADIOLOGY | Facility: HOSPITAL | Age: 36
DRG: 271 | End: 2021-08-24
Payer: MEDICARE

## 2021-08-24 LAB
ANION GAP SERPL CALCULATED.3IONS-SCNC: 5 MMOL/L (ref 4–13)
APTT PPP: 68 SECONDS (ref 23–37)
APTT PPP: 69 SECONDS (ref 23–37)
BACTERIA UR QL AUTO: NORMAL /HPF
BASE EX.OXY STD BLDV CALC-SCNC: 84.9 % (ref 60–80)
BASE EXCESS BLDV CALC-SCNC: -1.3 MMOL/L
BILIRUB UR QL STRIP: NEGATIVE
BUN SERPL-MCNC: 19 MG/DL (ref 5–25)
CALCIUM SERPL-MCNC: 7.8 MG/DL (ref 8.3–10.1)
CHLORIDE SERPL-SCNC: 107 MMOL/L (ref 100–108)
CLARITY UR: CLEAR
CO2 SERPL-SCNC: 24 MMOL/L (ref 21–32)
COLOR UR: YELLOW
CREAT SERPL-MCNC: 0.54 MG/DL (ref 0.6–1.3)
ERYTHROCYTE [DISTWIDTH] IN BLOOD BY AUTOMATED COUNT: 12.9 % (ref 11.6–15.1)
EST. AVERAGE GLUCOSE BLD GHB EST-MCNC: 237 MG/DL
GFR SERPL CREATININE-BSD FRML MDRD: 142 ML/MIN/1.73SQ M
GLUCOSE SERPL-MCNC: 198 MG/DL (ref 65–140)
GLUCOSE SERPL-MCNC: 199 MG/DL (ref 65–140)
GLUCOSE SERPL-MCNC: 208 MG/DL (ref 65–140)
GLUCOSE SERPL-MCNC: 242 MG/DL (ref 65–140)
GLUCOSE SERPL-MCNC: 91 MG/DL (ref 65–140)
GLUCOSE UR STRIP-MCNC: ABNORMAL MG/DL
HBA1C MFR BLD: 9.9 %
HCO3 BLDV-SCNC: 23.4 MMOL/L (ref 24–30)
HCT VFR BLD AUTO: 35.8 % (ref 34.8–46.1)
HGB BLD-MCNC: 12.7 G/DL (ref 11.5–15.4)
HGB UR QL STRIP.AUTO: ABNORMAL
HYALINE CASTS #/AREA URNS LPF: NORMAL /LPF
KETONES UR STRIP-MCNC: NEGATIVE MG/DL
LEUKOCYTE ESTERASE UR QL STRIP: NEGATIVE
MCH RBC QN AUTO: 28.3 PG (ref 26.8–34.3)
MCHC RBC AUTO-ENTMCNC: 35.5 G/DL (ref 31.4–37.4)
MCV RBC AUTO: 80 FL (ref 82–98)
MRSA NOSE QL CULT: ABNORMAL
MRSA NOSE QL CULT: ABNORMAL
NITRITE UR QL STRIP: NEGATIVE
NON-SQ EPI CELLS URNS QL MICRO: NORMAL /HPF
O2 CT BLDV-SCNC: 15.5 ML/DL
PCO2 BLDV: 39.3 MM HG (ref 42–50)
PH BLDV: 7.39 [PH] (ref 7.3–7.4)
PH UR STRIP.AUTO: 5.5 [PH]
PLATELET # BLD AUTO: 334 THOUSANDS/UL (ref 149–390)
PMV BLD AUTO: 10.4 FL (ref 8.9–12.7)
PO2 BLDV: 52.2 MM HG (ref 35–45)
POTASSIUM SERPL-SCNC: 4 MMOL/L (ref 3.5–5.3)
PROT UR STRIP-MCNC: NEGATIVE MG/DL
RBC # BLD AUTO: 4.48 MILLION/UL (ref 3.81–5.12)
RBC #/AREA URNS AUTO: NORMAL /HPF
SODIUM SERPL-SCNC: 136 MMOL/L (ref 136–145)
SP GR UR STRIP.AUTO: 1.02 (ref 1–1.03)
UROBILINOGEN UR QL STRIP.AUTO: 0.2 E.U./DL
VANCOMYCIN TROUGH SERPL-MCNC: <0.8 UG/ML (ref 10–20)
WBC # BLD AUTO: 6.71 THOUSAND/UL (ref 4.31–10.16)
WBC #/AREA URNS AUTO: NORMAL /HPF

## 2021-08-24 PROCEDURE — 85027 COMPLETE CBC AUTOMATED: CPT | Performed by: PHYSICIAN ASSISTANT

## 2021-08-24 PROCEDURE — 93926 LOWER EXTREMITY STUDY: CPT | Performed by: SURGERY

## 2021-08-24 PROCEDURE — 99232 SBSQ HOSP IP/OBS MODERATE 35: CPT | Performed by: SURGERY

## 2021-08-24 PROCEDURE — 80202 ASSAY OF VANCOMYCIN: CPT | Performed by: INTERNAL MEDICINE

## 2021-08-24 PROCEDURE — 82948 REAGENT STRIP/BLOOD GLUCOSE: CPT

## 2021-08-24 PROCEDURE — 82805 BLOOD GASES W/O2 SATURATION: CPT | Performed by: PHYSICIAN ASSISTANT

## 2021-08-24 PROCEDURE — 81001 URINALYSIS AUTO W/SCOPE: CPT | Performed by: INTERNAL MEDICINE

## 2021-08-24 PROCEDURE — 73560 X-RAY EXAM OF KNEE 1 OR 2: CPT

## 2021-08-24 PROCEDURE — 94660 CPAP INITIATION&MGMT: CPT

## 2021-08-24 PROCEDURE — 94760 N-INVAS EAR/PLS OXIMETRY 1: CPT

## 2021-08-24 PROCEDURE — 93922 UPR/L XTREMITY ART 2 LEVELS: CPT | Performed by: SURGERY

## 2021-08-24 PROCEDURE — 83036 HEMOGLOBIN GLYCOSYLATED A1C: CPT | Performed by: PHYSICIAN ASSISTANT

## 2021-08-24 PROCEDURE — 99232 SBSQ HOSP IP/OBS MODERATE 35: CPT | Performed by: PHYSICIAN ASSISTANT

## 2021-08-24 PROCEDURE — 85730 THROMBOPLASTIN TIME PARTIAL: CPT | Performed by: INTERNAL MEDICINE

## 2021-08-24 PROCEDURE — 80048 BASIC METABOLIC PNL TOTAL CA: CPT | Performed by: PHYSICIAN ASSISTANT

## 2021-08-24 RX ORDER — QUETIAPINE FUMARATE 100 MG/1
300 TABLET, FILM COATED ORAL
Status: DISCONTINUED | OUTPATIENT
Start: 2021-08-25 | End: 2021-08-27 | Stop reason: HOSPADM

## 2021-08-24 RX ORDER — OXYCODONE HYDROCHLORIDE 5 MG/1
5 TABLET ORAL EVERY 4 HOURS PRN
Status: DISCONTINUED | OUTPATIENT
Start: 2021-08-24 | End: 2021-08-27 | Stop reason: HOSPADM

## 2021-08-24 RX ORDER — HYDROMORPHONE HCL IN WATER/PF 6 MG/30 ML
0.2 PATIENT CONTROLLED ANALGESIA SYRINGE INTRAVENOUS EVERY 4 HOURS PRN
Status: DISCONTINUED | OUTPATIENT
Start: 2021-08-24 | End: 2021-08-27 | Stop reason: HOSPADM

## 2021-08-24 RX ORDER — HYDROXYZINE HYDROCHLORIDE 25 MG/1
50 TABLET, FILM COATED ORAL 3 TIMES DAILY PRN
Status: DISCONTINUED | OUTPATIENT
Start: 2021-08-24 | End: 2021-08-27 | Stop reason: HOSPADM

## 2021-08-24 RX ADMIN — CEFAZOLIN SODIUM 2000 MG: 2 SOLUTION INTRAVENOUS at 17:23

## 2021-08-24 RX ADMIN — ATORVASTATIN CALCIUM 40 MG: 40 TABLET, FILM COATED ORAL at 17:23

## 2021-08-24 RX ADMIN — QUETIAPINE FUMARATE 300 MG: 100 TABLET ORAL at 08:00

## 2021-08-24 RX ADMIN — OXYCODONE HYDROCHLORIDE 10 MG: 10 TABLET ORAL at 05:55

## 2021-08-24 RX ADMIN — VANCOMYCIN HYDROCHLORIDE 1500 MG: 5 INJECTION, POWDER, LYOPHILIZED, FOR SOLUTION INTRAVENOUS at 19:25

## 2021-08-24 RX ADMIN — INSULIN GLARGINE 30 UNITS: 100 INJECTION, SOLUTION SUBCUTANEOUS at 08:07

## 2021-08-24 RX ADMIN — INSULIN LISPRO 1 UNITS: 100 INJECTION, SOLUTION INTRAVENOUS; SUBCUTANEOUS at 22:23

## 2021-08-24 RX ADMIN — CHLORPROMAZINE HYDROCHLORIDE 100 MG: 25 TABLET, FILM COATED ORAL at 22:23

## 2021-08-24 RX ADMIN — SODIUM CHLORIDE 125 ML/HR: 0.9 INJECTION, SOLUTION INTRAVENOUS at 05:57

## 2021-08-24 RX ADMIN — CLOPIDOGREL BISULFATE 75 MG: 75 TABLET ORAL at 08:00

## 2021-08-24 RX ADMIN — INSULIN LISPRO 4 UNITS: 100 INJECTION, SOLUTION INTRAVENOUS; SUBCUTANEOUS at 07:52

## 2021-08-24 RX ADMIN — LURASIDONE HYDROCHLORIDE 80 MG: 80 TABLET, FILM COATED ORAL at 07:51

## 2021-08-24 RX ADMIN — NICOTINE 1 PATCH: 21 PATCH, EXTENDED RELEASE TRANSDERMAL at 08:01

## 2021-08-24 RX ADMIN — PHENYTOIN 1 MG: 125 SUSPENSION ORAL at 22:23

## 2021-08-24 RX ADMIN — HYDROXYZINE HYDROCHLORIDE 50 MG: 25 TABLET, FILM COATED ORAL at 08:00

## 2021-08-24 RX ADMIN — OXYCODONE HYDROCHLORIDE 5 MG: 5 TABLET ORAL at 19:25

## 2021-08-24 RX ADMIN — INSULIN LISPRO 5 UNITS: 100 INJECTION, SOLUTION INTRAVENOUS; SUBCUTANEOUS at 12:49

## 2021-08-24 RX ADMIN — INSULIN LISPRO 2 UNITS: 100 INJECTION, SOLUTION INTRAVENOUS; SUBCUTANEOUS at 12:48

## 2021-08-24 RX ADMIN — INSULIN LISPRO 5 UNITS: 100 INJECTION, SOLUTION INTRAVENOUS; SUBCUTANEOUS at 17:23

## 2021-08-24 RX ADMIN — SODIUM CHLORIDE 75 ML/HR: 0.9 INJECTION, SOLUTION INTRAVENOUS at 14:16

## 2021-08-24 RX ADMIN — HYDROMORPHONE HYDROCHLORIDE 0.5 MG: 1 INJECTION, SOLUTION INTRAMUSCULAR; INTRAVENOUS; SUBCUTANEOUS at 07:51

## 2021-08-24 RX ADMIN — INSULIN LISPRO 5 UNITS: 100 INJECTION, SOLUTION INTRAVENOUS; SUBCUTANEOUS at 07:53

## 2021-08-24 RX ADMIN — CEFAZOLIN SODIUM 2000 MG: 2 SOLUTION INTRAVENOUS at 10:21

## 2021-08-24 RX ADMIN — HEPARIN SODIUM 10.6 UNITS/KG/HR: 10000 INJECTION, SOLUTION INTRAVENOUS at 19:25

## 2021-08-24 RX ADMIN — CEFAZOLIN SODIUM 2000 MG: 2 SOLUTION INTRAVENOUS at 02:13

## 2021-08-24 RX ADMIN — HYDROMORPHONE HYDROCHLORIDE 0.2 MG: 0.2 INJECTION, SOLUTION INTRAMUSCULAR; INTRAVENOUS; SUBCUTANEOUS at 22:30

## 2021-08-24 NOTE — ASSESSMENT & PLAN NOTE
· Appreciate input per pulmonary, s/p perioperative assessment  · No PFT on file, unclear copd hx  · Regardless no signs of exacerbation, no indication for steroids at this time  · Ordered cpap qhs  · Pulm toilet  · PFT as outpatient

## 2021-08-24 NOTE — PROGRESS NOTES
[address] [facility]  Progress Note - Hellen Ban 1985, 30 y o  female MRN: [id number]  Unit/Bed#: -01 Encounter: [id number]  Primary Care Provider: Jonah Bazan DO   Date and time admitted to hospital: 8/22/2021  8:58 PM     DOS: 8/24/2021  ADDENDUM: pt's MRSA swab is positive, will d/c ancef and switch to IV vancomycin for now  Monitor for improvement  Orthopedic consultation appreciated  * Acute pain of left knee  Assessment & Plan  · Pt presented with worsening pain of the left knee, redness and drainage   · S/p CT LE revealing:  Skin thickening overlying the prepatellar soft tissues which demonstrates underlying focal subcutaneous stranding which may be due to cellulitis or early prepatellar bursitis  No evidence of discrete fluid collection to suggest drainable abscess  No evidence of knee joint effusion  · Likely in setting of underlying cellulitis   · Currently on IV Ancef 2 g Q8H  · Obtain orthopedics consultation to evaluate for possible joint infection   · Blood cultures negative x 24 hours, pt not meeting SIRS or sepsis criteria   · Arterial duplex with evidence of SFA stent occlusion and popliteal stenosis   · PRN pain management  · Vascular following, plan for IR angiogram tomorrow 8/25    Peripheral artery disease ([facility] )  Assessment & Plan  · Hx of nonhealing left 5th toe ulcer s/p LLE angiogram with L SFA stent placement on 8/11/21     · Arterial duplex performed here with evidence of SFA stent occlusion and popliteal stenosis   · Vascular surgery following,  · Plan for IR angiogram tomorrow 8/25  · NPO after midnight   · Decrease IVF hydration to 75 cc/hr  · Pulmonary consulted for clearance from respiratory standpoint for anesthesia   · Continue IV heparin gtt, plavix and statin    Bipolar disorder ([facility] )  Assessment & Plan  · Mood currently appears stable  · However noted to have intermittent waxing and waning mentation here with increased somnolence and lethargy   · Pt has not utilized a significant amount of narcotics here, however will decrease PRN IV dilaudid to 0 2 mg Q4H for severe pain, d/c oxycodone 10 mg and continue PRN oxycodone 5 mg for severe pain   · Transition pt from scheduled atarax to PRN for anxiety and switch Seroquel 300 mg to QHS rather than in the AM  · Likely combination of significant psych medications in addition to narcotics for pain control   · Monitor mentation closely, if no improvement of adjustments consider further work up with imaging and blood work  · Continue Thorazine 100 mg QHS, Latuda 80 mg daily, Trazodone 200 mg daily  · Monitor    Nicotine dependence  Assessment & Plan  · Continue nicotine replacement  · Encourage cessation    COPD (chronic obstructive pulmonary disease) ([facility])  Assessment & Plan  · Appreciate input per pulmonary, s/p perioperative assessment  · No PFT on file, unclear copd hx  · Regardless no signs of exacerbation, no indication for steroids at this time  · Ordered cpap qhs  · Pulm toilet  · PFT as outpatient    Type 2 diabetes mellitus with diabetic polyneuropathy, with long-term current use of insulin ([facility] )  Assessment & Plan    Lab Results   Component Value Date    HGBA1C 9 5 (H) 08/22/2021   · Appears uncontrolled as an outpatient as evidenced by hgbA1c  · Continue Lantus 30 units with scheduled humalog 5 units TID with meals (recently added)  · Continue SSI coverage   · QID glucose checks  · Consistent carb diet   · Monitor and adjust regimen as needed, consider increasing meal time insulin if continued hyperglycemia    Hypertension  Assessment & Plan  · Initially presenting w/ htn urgency, now significantly improved   · Continue prazosin 1 mg QHS  · Lisinopril on hold   · Monitor     VTE Pharmacologic Prophylaxis:   High Risk (Score >/= 5) - Pharmacological DVT Prophylaxis Ordered: heparin drip  Sequential Compression Devices Ordered      Patient Centered Rounds: I performed bedside rounds with nursing staff today  Discussions with Specialists or Other Care Team Provider: Discussed with vascular surgery, ortho, RN, CM and reviewed previous notes     Education and Discussions with Family / Patient: Updated  (wife) at bedside  Time Spent for Care: 30 minutes  More than 50% of total time spent on counseling and coordination of care as described above  Current Length of Stay: 1 day(s)  Current Patient Status: Inpatient   Certification Statement: The patient will continue to require additional inpatient hospital stay due to IR angiogram tomorrow, improvement of mentation, ortho consult and IV abx  Discharge Plan: Anticipate discharge in 48-72 hrs to discharge location to be determined pending rehab evaluations  Code Status: Level 1 - Full Code    Subjective:   Pt is very somnolent this morning  Wife at bedside reports she was more alert this morning before medications given  Mentation has been waxing and waning here, states she has been more fatigued and goes to sleep easily  Pt awakens to tactile stimuli but goes to sleep easily  Denies any pain currently  Objective:     Vitals:   Temp (24hrs), Av 9 °F (36 6 °C), Min:97 8 °F (36 6 °C), Max:97 9 °F (36 6 °C)    Temp:  [97 8 °F (36 6 °C)-97 9 °F (36 6 °C)] 97 8 °F (36 6 °C)  HR:  [72-82] 73  Resp:  [14-20] 20  BP: (134-147)/(74-85) 147/85  SpO2:  [93 %-97 %] 93 %  Body mass index is 41 07 kg/m²  Input and Output Summary (last 24 hours): Intake/Output Summary (Last 24 hours) at 2021 1133  Last data filed at 2021 0557  Gross per 24 hour   Intake 2727 39 ml   Output 600 ml   Net 2127 39 ml       Physical Exam:   Physical Exam  Vitals reviewed  Constitutional:       General: She is not in acute distress  Appearance: She is not toxic-appearing  Comments: Pt is in no acute distress lying in her hospital bed resting comfortably  Somnolent, lethargic   Awakens to tactile stimuli, goes back to sleep easily    HENT:      Head: Normocephalic and atraumatic  Eyes:      Extraocular Movements: Extraocular movements intact  Conjunctiva/sclera: Conjunctivae normal    Cardiovascular:      Rate and Rhythm: Normal rate and regular rhythm  Pulses: Normal pulses  Pulmonary:      Effort: Pulmonary effort is normal  No respiratory distress  Breath sounds: No wheezing  Abdominal:      General: Bowel sounds are normal  There is no distension  Palpations: Abdomen is soft  Tenderness: There is no abdominal tenderness  Musculoskeletal:      Right lower leg: No edema  Left lower leg: No edema  Comments: Mild warmth of the left knee    Skin:     General: Skin is warm and dry            Additional Data:     Labs:  Results from last 7 days   Lab Units 08/24/21  0554 08/23/21  0616   WBC Thousand/uL 6 71 8 70   HEMOGLOBIN g/dL 12 7 12 6   HEMATOCRIT % 35 8 34 9   PLATELETS Thousands/uL 334 333   NEUTROS PCT %  --  56   LYMPHS PCT %  --  32   MONOS PCT %  --  6   EOS PCT %  --  4     Results from last 7 days   Lab Units 08/24/21  0554 08/23/21  0616   SODIUM mmol/L 136 136   POTASSIUM mmol/L 4 0 3 7   CHLORIDE mmol/L 107 105   CO2 mmol/L 24 27   BUN mg/dL 19 17   CREATININE mg/dL 0 54* 0 57*   ANION GAP mmol/L 5 4   CALCIUM mg/dL 7 8* 7 9*   ALBUMIN g/dL  --  2 2*   TOTAL BILIRUBIN mg/dL  --  0 15*   ALK PHOS U/L  --  92   ALT U/L  --  15   AST U/L  --  5   GLUCOSE RANDOM mg/dL 208* 281*     Results from last 7 days   Lab Units 08/23/21  0616   INR  1 01     Results from last 7 days   Lab Units 08/24/21  1039 08/24/21  0603 08/23/21  2048 08/23/21  1611 08/23/21  1208 08/23/21  0651 08/23/21  0016   POC GLUCOSE mg/dl 199* 242* 247* 222* 279* 303* 327*     Results from last 7 days   Lab Units 08/24/21  0554 08/22/21  2309   HEMOGLOBIN A1C % 9 9* 9 5*     Results from last 7 days   Lab Units 08/23/21  0616 08/22/21  2309 08/22/21  1518   LACTIC ACID mmol/L  --   --  1 1   PROCALCITONIN ng/ml <0 05 <0 05  --        Lines/Drains:  Invasive Devices     Peripheral Intravenous Line            Peripheral IV 08/22/21 Right Antecubital 1 day    Peripheral IV 08/23/21 Left Antecubital 1 day                      Imaging: Reviewed radiology reports from this admission including: CT lower extremity    Recent Cultures (last 7 days):   Results from last 7 days   Lab Units 08/22/21  2309 08/22/21  1552 08/22/21  1518   BLOOD CULTURE  No Growth at 24 hrs  No Growth at 24 hrs  No Growth at 24 hrs  No Growth at 24 hrs         Last 24 Hours Medication List:   Current Facility-Administered Medications   Medication Dose Route Frequency Provider Last Rate    acetaminophen  650 mg Oral Q6H PRN Masha Hough MD      aluminum-magnesium hydroxide-simethicone  30 mL Oral Q6H PRN Masha Hough MD      atorvastatin  40 mg Oral Daily With Tato Moser MD      cefazolin  2,000 mg Intravenous Q8H Myron Hidden, DO Stopped (08/24/21 1104)    chlorproMAZINE  100 mg Oral HS Masha Hough MD      clopidogrel  75 mg Oral Daily Mukesh Parrish MD      docusate sodium  100 mg Oral BID PRN Masha Hough MD      heparin (porcine)  3-30 Units/kg/hr (Order-Specific) Intravenous Titrated Masha Hough MD 10 6 Units/kg/hr (08/23/21 1928)    heparin (porcine)  3,800 Units Intravenous Q1H PRN Masha Hough MD      heparin (porcine)  7,600 Units Intravenous Q1H PRN Masha Hough MD      HYDROmorphone  0 5 mg Intravenous Q4H PRN Myron Hidden, DO      hydrOXYzine HCL  50 mg Oral TID PRN Josue Page PA-C      insulin glargine  30 Units Subcutaneous QAM Aaliyah Agudelo, DO      insulin lispro  1-5 Units Subcutaneous HS Aaliyahyair Agudelo, DO      insulin lispro  2-12 Units Subcutaneous TID AC Aaliyah Agudelo, DO      insulin lispro  5 Units Subcutaneous TID With Meals Myron Hidden, DO      lamoTRIgine  25 mg Oral Daily Masha Hough MD     [facility]

## 2021-08-24 NOTE — ASSESSMENT & PLAN NOTE
· Hx of nonhealing left 5th toe ulcer s/p LLE angiogram with L SFA stent placement on 8/11/21     · Arterial duplex performed here with evidence of SFA stent occlusion and popliteal stenosis   · Vascular surgery following,  · Plan for IR angiogram tomorrow 8/25  · NPO after midnight   · Decrease IVF hydration to 75 cc/hr  · Pulmonary consulted for clearance from respiratory standpoint for anesthesia   · Continue IV heparin gtt, plavix and statin

## 2021-08-24 NOTE — ASSESSMENT & PLAN NOTE
Hx of nonhealing left 5th toe ulcer s/p LLE angiogram with L SFA stent placement on 8/11/21  Further eval ncluding arterial duplex with evidence of SFA stent occlusion and popliteal stenosis     Vascular surgery on board  On heparin gtt  Planned for angiogram per IR tomorrow  S/p perioperative assessment from  Pulmonary standpoint  Cont prn pain analgesics

## 2021-08-24 NOTE — PROGRESS NOTES
Vancomycin IV Pharmacy-to-Dose Consultation    Shikha Pal is a 30 y o  female who is currently receiving Vancomycin IV with management by the Pharmacy Consult service  Relevant clinical data and objective history reviewed:  Temp Readings from Last 3 Encounters:   08/24/21 98 1 °F (36 7 °C) (Oral)   08/22/21 98 5 °F (36 9 °C) (Oral)   08/11/21 98 3 °F (36 8 °C) (Oral)     BP (!) 165/102 (BP Location: Right arm)   Pulse 83   Temp 98 1 °F (36 7 °C) (Oral)   Resp 16   Ht 5' 1" (1 549 m)   Wt 98 6 kg (217 lb 6 oz)   LMP 08/22/2021   SpO2 98%   BMI 41 07 kg/m²     I/O last 3 completed shifts: In: 3343 6 [P O :240; I V :3053 6; IV Piggyback:50]  Out: 600 [Urine:600]    Lab Results   Component Value Date/Time    BUN 19 08/24/2021 05:54 AM    WBC 6 71 08/24/2021 05:54 AM    HGB 12 7 08/24/2021 05:54 AM    HCT 35 8 08/24/2021 05:54 AM    MCV 80 (L) 08/24/2021 05:54 AM     08/24/2021 05:54 AM     Creatinine   Date Value Ref Range Status   08/24/2021 0 54 (L) 0 60 - 1 30 mg/dL Final     Comment:     Standardized to IDMS reference method   08/23/2021 0 57 (L) 0 60 - 1 30 mg/dL Final     Comment:     Standardized to IDMS reference method     Vancomycin Tr   Date Value Ref Range Status   08/24/2021 <0 8 (L) 10 0 - 20 0 ug/mL Final         Vancomycin Assessment:  Indication: knee infection  Status: stable  Micro: blood cultures pending, no growth after 24hr  Procalcitonin: <0 05 on 8/23  Renal Function: Scr 0 54 mg/dL on 8/24  Potential Nephrotoxicity Factors:  Medications: None  Patient-Factors: None  Days of Therapy: Day 1  Current Dose: 1500mg IV Q8h  Goal Trough: 15-20 (appropriate for most indications)   Goal AUC(24h): 400-600    Vancomycin Plan:  New Dosing: change to 1500mg IV Q8h  Next Level: 8/26 at 0930  Renal Function Monitoring: daily BMP      Pharmacy will continue to follow closely for s/sx of nephrotoxicity, infusion reactions and appropriateness of therapy    BMP and CBC will be ordered per protocol  We will continue to follow the patient's culture results and clinical progress daily      Andrew Graham, UnaD

## 2021-08-24 NOTE — ASSESSMENT & PLAN NOTE
Secondary to surrounding cellulitis  S/p CT LE revealing:  Skin thickening overlying the prepatellar soft tissues which demonstrates underlying focal subcutaneous stranding which may be due to cellulitis or early prepatellar bursitis  No evidence of discrete fluid collection to suggest drainable abscess  No evidence of knee joint effusion    Pt did not meet SIRs criteria  Change abx to IV ancef  Cont to monitor  Prn pain analgesic

## 2021-08-24 NOTE — PROGRESS NOTES
[address] [facility]  Progress Note - Bailee Suarez 1985, 30 y o  female MRN: [id number]  Unit/Bed#: -Y[place]ick Encounter: [id number]  Primary Care Provider: Diamond Guzmán DO   Date and time admitted to hospital: 8/22/2021  8:58 PM    * Acute pain of left knee  Assessment & Plan  Secondary to surrounding cellulitis  S/p CT LE revealing:  Skin thickening overlying the prepatellar soft tissues which demonstrates underlying focal subcutaneous stranding which may be due to cellulitis or early prepatellar bursitis  No evidence of discrete fluid collection to suggest drainable abscess  No evidence of knee joint effusion  Pt did not meet SIRs criteria  Change abx to IV ancef  Cont to monitor  Prn pain analgesic    Peripheral artery disease ([facility])  Assessment & Plan  Hx of nonhealing left 5th toe ulcer s/p LLE angiogram with L SFA stent placement on 8/11/21  Further eval ncluding arterial duplex with evidence of SFA stent occlusion and popliteal stenosis  Vascular surgery on board  On heparin gtt  Planned for angiogram per IR on wednesday  S/p perioperative assessment from  Pulmonary standpoint  Cont prn pain analgesics    COPD (chronic obstructive pulmonary disease) ([facility] )  Assessment & Plan  Appreciate input per pulmonary, s/p perioperative assessment  No PFT on file, unclear copd hx  Regardless no sign of exacerbation  Ordered cpap qhs  Pulm toilet  PFT as outpatient    Bursitis of left knee  Assessment & Plan        Bipolar disorder [facility])  Assessment & Plan  Cont psych meds  Verified per pharmacy, latuda dosing of 80mg daily    Nicotine dependence  Assessment & Plan  Continue nicotine replacement      Type 2 diabetes mellitus with diabetic polyneuropathy, with long-term current use of insulin ([facility])  Assessment & Plan    Lab Results   Component Value Date    HGBA1C 9 5 (H) 08/22/2021   w/ hyperglycemia  Cont lantus  Added ow dose meal coverage  Cont ISS  Cont to monitor    Hypertension  Assessment & Plan  Initially presenting w/ htn urgency now much improved  Cont meds      VTE Pharmacologic Prophylaxis:   Pharmacologic: Heparin Drip  Mechanical VTE Prophylaxis in Place: No    Patient Centered Rounds: I have performed bedside rounds with nursing staff today  Discussions with Specialists or Other Care Team Provider:     Education and Discussions with Family / Patient: Patient, called pt's partner, no answer, left msg    Time Spent for Care: 30 minutes  More than 50% of total time spent on counseling and coordination of care as described above  Current Length of Stay: 0 day(s)    Current Patient Status: Inpatient   Certification Statement: The patient will continue to require additional inpatient hospital stay due to arterial occulusion, LE cellulitis    Discharge Plan:     Code Status: Level 1 - Full Code      Subjective:   Pt seen and examined, LE pain relieved with pain analgesics  Afebrile, non toxic appearing  Reports of improved RT erythema    Objective:     Vitals:   Temp (24hrs), Av °F (36 7 °C), Min:97 9 °F (36 6 °C), Max:98 2 °F (36 8 °C)    Temp:  [97 9 °F (36 6 °C)-98 2 °F (36 8 °C)] 97 9 °F (36 6 °C)  HR:  [68-76] 72  Resp:  [14-20] 14  BP: (142-255)/() 143/78  SpO2:  [97 %-99 %] 97 %  Body mass index is 41 07 kg/m²  Input and Output Summary (last 24 hours): Intake/Output Summary (Last 24 hours) at 2021  Last data filed at 2021  Gross per 24 hour   Intake 2168 58 ml   Output 600 ml   Net 1568 58 ml       Physical Exam:     Physical Exam  Constitutional:       Appearance: She is obese  Cardiovascular:      Rate and Rhythm: Normal rate and regular rhythm  Pulses: Normal pulses  Heart sounds: Normal heart sounds  No murmur heard  Pulmonary:      Effort: Pulmonary effort is normal  No respiratory distress  Breath sounds: Normal breath sounds  No wheezing or rales     Abdominal:      General: Abdomen is flat  Bowel sounds are normal  There is no distension  Palpations: Abdomen is soft  Tenderness: There is no abdominal tenderness  There is no guarding  Musculoskeletal:      Cervical back: Normal range of motion and neck supple  Comments: Mild erythema over L knee, small superficial wound, no drainage, tender to touch  Lt 5th toe ulcer   Neurological:      General: No focal deficit present  Mental Status: She is alert and oriented to person, place, and time  Mental status is at baseline  Cranial Nerves: No cranial nerve deficit  Motor: No weakness  Additional Data:     Labs:    Results from last 7 days   Lab Units 08/23/21  0616   WBC Thousand/uL 8 70   HEMOGLOBIN g/dL 12 6   HEMATOCRIT % 34 9   PLATELETS Thousands/uL 333   NEUTROS PCT % 56   LYMPHS PCT % 32   MONOS PCT % 6   EOS PCT % 4     Results from last 7 days   Lab Units 08/23/21  0616   SODIUM mmol/L 136   POTASSIUM mmol/L 3 7   CHLORIDE mmol/L 105   CO2 mmol/L 27   BUN mg/dL 17   CREATININE mg/dL 0 57*   ANION GAP mmol/L 4   CALCIUM mg/dL 7 9*   ALBUMIN g/dL 2 2*   TOTAL BILIRUBIN mg/dL 0 15*   ALK PHOS U/L 92   ALT U/L 15   AST U/L 5   GLUCOSE RANDOM mg/dL 281*     Results from last 7 days   Lab Units 08/23/21  0616   INR  1 01     Results from last 7 days   Lab Units 08/23/21  1611 08/23/21  1208 08/23/21  0651 08/23/21  0016   POC GLUCOSE mg/dl 222* 279* 303* 327*     Results from last 7 days   Lab Units 08/22/21  2309   HEMOGLOBIN A1C % 9 5*     Results from last 7 days   Lab Units 08/23/21  0616 08/22/21  2309 08/22/21  1518   LACTIC ACID mmol/L  --   --  1 1   PROCALCITONIN ng/ml <0 05 <0 05  --            * I Have Reviewed All Lab Data Listed Above  * Additional Pertinent Lab Tests Reviewed:  All Labs Within Last 24 Hours Reviewed    Imaging:    Imaging Reports Reviewed Today Include:   Imaging Personally Reviewed by Myself Includes:      Recent Cultures (last 7 days):     Results from last 7 days   Lab Units 08/22/21  2309 08/22/21  1552 08/22/21  1518   BLOOD CULTURE  Received in Microbiology Lab  Culture in Progress  Received in Microbiology Lab  Culture in Progress  Received in Microbiology Lab  Culture in Progress  No Growth at 24 hrs         Last 24 Hours Medication List:   Current Facility-Administered Medications   Medication Dose Route Frequency Provider Last Rate    acetaminophen  650 mg Oral Q6H PRN Rebel Alexandra MD      aluminum-magnesium hydroxide-simethicone  30 mL Oral Q6H PRN Rebel Alexandra MD      atorvastatin  40 mg Oral Daily With Jackie Wells MD      cefazolin  2,000 mg Intravenous Q8H Scott Matthew DO 2,000 mg (08/23/21 1815)    chlorproMAZINE  100 mg Oral HS Rebel Alexandra MD      clopidogrel  75 mg Oral Daily Marian Trujillo MD      docusate sodium  100 mg Oral BID PRN Rebel Alexandra MD      heparin (porcine)  3-30 Units/kg/hr (Order-Specific) Intravenous Titrated Rebel Alexandra MD 10 6 Units/kg/hr (08/23/21 1928)    heparin (porcine)  3,800 Units Intravenous Q1H PRN Rebel Alexandra MD      heparin (porcine)  7,600 Units Intravenous Q1H PRN Rebel Alexandra MD      HYDROmorphone  0 5 mg Intravenous Q4H PRN Scott Matthew DO      hydrOXYzine HCL  50 mg Oral TID Rebel Alexandra MD      [START ON 8/24/2021] insulin glargine  15 Units Subcutaneous QAM Aaliyah Agudelo DO      insulin lispro  1-5 Units Subcutaneous HS Aaliyah Agudelo DO      insulin lispro  2-12 Units Subcutaneous TID AC Aaliyah Agudelo DO      [START ON 8/24/2021] insulin lispro  5 Units Subcutaneous TID With Meals Scott Matthew DO      lamoTRIgine  25 mg Oral Daily Rebel Alexandra MD      lurasidone  80 mg Oral Daily With Breakfast Scott Matthew DO      nicotine  1 patch Transdermal Daily Rebel Alexandra MD      ondansetron  4 mg Intravenous Q6H PRN Rebel Alexandra MD      oxyCODONE  10 mg Oral Q4H PRN Adela Mullins Michelle Mathur DO      oxyCODONE  5 mg Oral Q4H PRN Julia Paz MD      prazosin  1 mg Oral HS Julia Paz MD      QUEtiapine  300 mg Oral Daily Julia Paz MD      sodium chloride  125 mL/hr Intravenous Continuous Julia Paz  mL/hr (08/23/21 2022)    traZODone  200 mg Oral HS Julia Paz MD          Today, Patient Was Seen By: Rob Betancourt DO    ** Please Note: Dictation voice to text software may have been used in the creation of this document   **

## 2021-08-24 NOTE — ASSESSMENT & PLAN NOTE
· Initially presenting w/ htn urgency, now significantly improved   · Continue prazosin 1 mg QHS  · Lisinopril on hold   · Monitor

## 2021-08-24 NOTE — ASSESSMENT & PLAN NOTE
Appreciate input per pulmonary, s/p perioperative assessment  No PFT on file, unclear copd hx  Regardless no sign of exacerbation  Ordered cpap qhs  Pulm toilet  PFT as outpatient

## 2021-08-24 NOTE — ASSESSMENT & PLAN NOTE
· Pt presented with worsening pain of the left knee, redness and drainage   · S/p CT LE revealing:  Skin thickening overlying the prepatellar soft tissues which demonstrates underlying focal subcutaneous stranding which may be due to cellulitis or early prepatellar bursitis  No evidence of discrete fluid collection to suggest drainable abscess  No evidence of knee joint effusion    · Likely in setting of underlying cellulitis   · Currently on IV Ancef 2 g Q8H  · Obtain orthopedics consultation to evaluate for possible joint infection   · Blood cultures negative x 24 hours, pt not meeting SIRS or sepsis criteria   · Arterial duplex with evidence of SFA stent occlusion and popliteal stenosis   · PRN pain management  · Vascular following, plan for IR angiogram tomorrow 8/25

## 2021-08-24 NOTE — CONSULTS
Orthopedics   David Ferro 30 y o  female MRN: [id number]  Unit/Bed#: TANIA MEHTA 577-01      Chief Complaint:   left knee pain    HPI:   30 y  o female complaining of left knee erythema and pain  Her left knee pain started 3 days ago and is atraumatic in nature  She denies any recent falls or history of trauma to that knee  She noted a superficial "bump" over her patella that was tender and warm to touch  She denies pain with knee motion and although is not that mobile at baseline, she has been able to bear weight on the left leg  She denies fevers or chills  She was initially evaluated [facility] and transferred to [facility] for an occluded left SFA stent and evaluation of her left knee pain  She has been on IV ancef since transfer  LLE CT showed superficial subcutaneous stranding overlying the prepatellar bursa  Orthopedics was consulted for evaluation of possible intraarticular knee infection  PMH significant for DM with last A1c of 9 9, HTN, COPD, TIA, and left foot ulcer 2/2 to PAD with recent left SFA stent placement       Review Of Systems:   · Skin: left knee erythema and swelling   · Neuro: See HPI  · Musculoskeletal: See HPI  · 14 point review of systems negative except as stated above     Past Medical History:   Past Medical History:   Diagnosis Date    Asthma     [facility] disorder [facility]     COPD [facility]chronic obstructive pulmonary disease) [facility]     Depression     Diabetes mellitus ([facility]     Hypertension     Psychiatric disorder     cutting history    PTSD (post-traumatic stress disorder)     Tendonitis        Past Surgical History:   Past Surgical History:   Procedure Laterality Date     SECTION      EAR SURGERY      IR LOWER EXTREMITY ANGIOGRAM  2021       Family History:  Family history reviewed and non-contributory  Family History   Problem Relation Age of Onset    Hypertension Mother     Diabetes Mother     HIV Mother     Heart disease Mother     No Known Problems Father Social History:  Social History     Socioeconomic History    Marital status: /Civil Union     Spouse name: None    Number of children: None    Years of education: None    Highest education level: None   Occupational History    None   Tobacco Use    Smoking status: Current Every Day Smoker     Packs/day: 2 00     Years: 17 00     Pack years: 34 00     Types: Cigarettes    Smokeless tobacco: Never Used    Tobacco comment: pt is down to 4 cigarettes a day    Vaping Use    Vaping Use: Never used   Substance and Sexual Activity    Alcohol use: Not Currently    Drug use: Not Currently     Types: Cocaine, Marijuana     Comment: 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    Sexual activity: Yes     Partners: Female, Male     Birth control/protection: None, Condom   Other Topics Concern    None   Social History Narrative    None     Social Determinants of Health     Financial Resource Strain:     Difficulty of Paying Living Expenses:    Food Insecurity:     Worried About Running Out of Food in the Last Year:     Ran Out of Food in the Last Year:    Transportation Needs:     Lack of Transportation (Medical):  Lack of Transportation (Non-Medical):    Physical Activity:     Days of Exercise per Week:     Minutes of Exercise per Session:    Stress:     Feeling of Stress :    Social Connections:     Frequency of Communication with Friends and Family:     Frequency of Social Gatherings with Friends and Family:     Attends Spiritism Services:     Active Member of Clubs or Organizations:     Attends Club or Organization Meetings:     Marital Status:    Intimate Partner Violence:     Fear of Current or Ex-Partner:     Emotionally Abused:     Physically Abused:     Sexually Abused:         Allergies:   No Known Allergies        Labs:  0   Lab Value Date/Time    HCT 35 8 08/24/2021 0554    HCT 34 9 08/23/2021 0616    HCT 40 5 08/22/2021 1518    HGB 12 7 08/24/2021 0554    HGB 12 6 08/23/2021 0616 HGB 14 9 08/22/2021 1518    INR 1 01 08/23/2021 0616    WBC 6 71 08/24/2021 0554    WBC 8 70 08/23/2021 0616    WBC 8 06 08/22/2021 1518       Meds:    Current Facility-Administered Medications:     acetaminophen (TYLENOL) tablet 650 mg, 650 mg, Oral, Q6H PRN, Sharda Burnham MD    aluminum-magnesium hydroxide-simethicone (MYLANTA) oral suspension 30 mL, 30 mL, Oral, Q6H PRN, Sharda Burnham MD    atorvastatin (LIPITOR) tablet 40 mg, 40 mg, Oral, Daily With Dinner, Sharda Burnham MD, 40 mg at 08/23/21 1815    ceFAZolin (ANCEF) IVPB (premix in dextrose) 2,000 mg 50 mL, 2,000 mg, Intravenous, Q8H, Tung Guerra DO, Stopped at 08/24/21 1104    chlorproMAZINE (THORAZINE) tablet 100 mg, 100 mg, Oral, HS, Sharda Burnham MD    clopidogrel (PLAVIX) tablet 75 mg, 75 mg, Oral, Daily, Angle Benoit MD, 75 mg at 08/24/21 0800    docusate sodium (COLACE) capsule 100 mg, 100 mg, Oral, BID PRN, Sharda Burnham MD, 100 mg at 08/23/21 0005    heparin (porcine) 25,000 units in 0 45% NaCl 250 mL infusion (premix), 3-30 Units/kg/hr (Order-Specific), Intravenous, Titrated, Sharda Burnham MD, Last Rate: 10 1 mL/hr at 08/23/21 1928, 10 6 Units/kg/hr at 08/23/21 1928    heparin (porcine) injection 3,800 Units, 3,800 Units, Intravenous, Q1H PRN, Sharda Burnham MD    heparin (porcine) injection 7,600 Units, 7,600 Units, Intravenous, Q1H PRN, Sharda Burnham MD    HYDROmorphone HCl (DILAUDID) injection 0 2 mg, 0 2 mg, Intravenous, Q4H PRN, Yassine Madison PA-C    hydrOXYzine HCL (ATARAX) tablet 50 mg, 50 mg, Oral, TID PRN, Yassine Madison PA-C    insulin glargine (LANTUS) subcutaneous injection 30 Units 0 3 mL, 30 Units, Subcutaneous, QAM, Tung Guardian, DO, 30 Units at 08/24/21 0807    insulin lispro (HumaLOG) 100 units/mL subcutaneous injection 1-5 Units, 1-5 Units, Subcutaneous, HS, Tung Guardian, DO, 2 Units at 08/23/21 2108    insulin lispro (HumaLOG) 100 units/mL subcutaneous injection 2-12 Units, 2-12 Units, Subcutaneous, TID AC, 4 Units at 08/24/21 0752 **AND** Fingerstick Glucose (POCT), , , TID AC, Aaliyah Agudelo DO    insulin lispro (HumaLOG) 100 units/mL subcutaneous injection 5 Units, 5 Units, Subcutaneous, TID With Meals, Jeannie Dey DO, 5 Units at 08/24/21 0753    lamoTRIgine (LaMICtal) tablet 25 mg, 25 mg, Oral, Daily, Savanna Mijares MD    lurasidone (LATUDA) tablet 80 mg, 80 mg, Oral, Daily With Breakfast, Jeannie Dey DO, 80 mg at 08/24/21 0751    nicotine (NICODERM CQ) 21 mg/24 hr TD 24 hr patch 1 patch, 1 patch, Transdermal, Daily, Savanna Mijares MD, 1 patch at 08/24/21 0801    ondansetron (ZOFRAN) injection 4 mg, 4 mg, Intravenous, Q6H PRN, Savanna Mijares MD    oxyCODONE (ROXICODONE) IR tablet 5 mg, 5 mg, Oral, Q4H PRN, Hal Jackson PA-C    prazosin (MINIPRESS) capsule 1 mg, 1 mg, Oral, HS, Savanna Mijares MD, 1 mg at 08/23/21 2109    [START ON 8/25/2021] QUEtiapine (SEROquel) tablet 300 mg, 300 mg, Oral, HS, Kaya Beltran PA-C    sodium chloride 0 9 % infusion, 75 mL/hr, Intravenous, Continuous, Kaya Beltran PA-C, Last Rate: 75 mL/hr at 08/24/21 1134, 75 mL/hr at 08/24/21 1134    traZODone (DESYREL) tablet 200 mg, 200 mg, Oral, HS, Savanna Mijares MD, 200 mg at 08/23/21 0005    Blood Culture:   Lab Results   Component Value Date    BLOODCX No Growth at 24 hrs  08/22/2021    BLOODCX No Growth at 24 hrs  08/22/2021       Wound Culture:   No results found for: WOUNDCULT    Ins and Outs:  I/O last 24 hours: In: 3227 4 [P O :240; I V :2937 4;  IV Piggyback:50]  Out: 600 [Urine:600]          Physical Exam:   /85   Pulse 73   Temp 97 8 °F (36 6 °C) (Oral)   Resp 20   Ht 5' 1" (1 549 m)   Wt 98 6 kg (217 lb 6 oz)   LMP 08/22/2021   SpO2 93%   BMI 41 07 kg/m²   Gen: Sleeping in bed comfortably, difficult to arouse, provides short responses to questions before falling back asleep   HEENT: Eyes clear,

## 2021-08-24 NOTE — MALNUTRITION/BMI
This medical record reflects one or more clinical indicators suggestive of morbid obesity  BMI Findings:  Adult BMI Classifications: Morbid Obesity 40-44 9     Body mass index is 41 07 kg/m²  See Nutrition note dated 8/23/21 for additional details  Completed nutrition assessment is viewable in the nutrition documentation

## 2021-08-24 NOTE — ASSESSMENT & PLAN NOTE
Lab Results   Component Value Date    HGBA1C 9 5 (H) 08/22/2021   · Appears uncontrolled as an outpatient as evidenced by hgbA1c  · Continue Lantus 30 units with scheduled humalog 5 units TID with meals (recently added)  · Continue SSI coverage   · QID glucose checks  · Consistent carb diet   · Monitor and adjust regimen as needed, consider increasing meal time insulin if continued hyperglycemia

## 2021-08-24 NOTE — ASSESSMENT & PLAN NOTE
· Mood currently appears stable  · However noted to have intermittent waxing and waning mentation here with increased somnolence and lethargy   · Pt has not utilized a significant amount of narcotics here, however will decrease PRN IV dilaudid to 0 2 mg Q4H for severe pain, d/c oxycodone 10 mg and continue PRN oxycodone 5 mg for severe pain   · Transition pt from scheduled atarax to PRN for anxiety and switch Seroquel 300 mg to QHS rather than in the AM  · Likely combination of significant psych medications in addition to narcotics for pain control   · Monitor mentation closely, if no improvement of adjustments consider further work up with imaging and blood work  · Continue Thorazine 100 mg QHS, Latuda 80 mg daily, Trazodone 200 mg daily  · Monitor

## 2021-08-24 NOTE — PROGRESS NOTES
[facility]  Progress Note - Tank Wilkerson 1985, 30 y o  female MRN: [id number]  Unit/Bed#: MS 57Anthony-01 Encounter: [id number]  Primary Care Provider: Beryle Dross, DO   Date and time admitted to hospital: 8/22/2021  8:58 PM    Peripheral artery disease [facility])  Assessment & Plan  35F with PMH significant for DM, COPD, HTN, h/o TIA, Bipolar with nonhealing left 5th toe ulcer s/p LLE angiogram with L SFA stent placement on 8/11/21 (primary runoff PT and peroneal) presenting with primary complaints of left knee pain, redness and purulent drainage  Work-up included arterial duplex with evidence of SFA stent occlusion and popliteal stenosis  Her primary complaints are concerning for either superficial abscess or underlying infectious process of the knee  This is unrelated to SFA stent occlusion and currently has no evidence of acute limb ischemia requiring emergent intervention at this time but will require angiogram at some point  Plan:  -Continue Plavix, hep gtt, statin  -[facility]   -IR consult for angiogram tomorrow 8/25   -Cleared by pulmonology for anesthesia  -Continue antibiotics per primary team  -Vascular surgery following          Subjective:  No acute events overnight  Continues to complain of left knee pain  Denies any pain in left foot or motor/sensory changes  Vitals:  /85   Pulse 73   Temp 97 8 °F (36 6 °C) (Oral)   Resp 20   Ht 5' 1" (1 549 m)   Wt 98 6 kg (217 lb 6 oz)   LMP 08/22/2021   SpO2 93%   BMI 41 07 kg/m²     I/Os:  I/O last 3 completed shifts: In: 3343 6 [P O :240; I V :3053 6; IV Piggyback:50]  Out: 600 [Urine:600]  No intake/output data recorded      Lab Results and Cultures:   Lab Results   Component Value Date    WBC 6 71 08/24/2021    HGB 12 7 08/24/2021    HCT 35 8 08/24/2021    MCV 80 (L) 08/24/2021     08/24/2021     Lab Results   Component Value Date    CALCIUM 7 8 (L) 08/24/2021    K 4 0 08/24/2021    CO2 24 08/24/2021     08/24/2021    BUN 19 08/24/2021    CREATININE 0 54 (L) 08/24/2021     Lab Results   Component Value Date    INR 1 01 08/23/2021    INR 0 91 08/22/2021    INR 0 96 08/11/2021    PROTIME 13 3 08/23/2021    PROTIME 10 3 08/22/2021    PROTIME 12 8 08/11/2021        Blood Culture:   Lab Results   Component Value Date    BLOODCX No Growth at 24 hrs  08/22/2021    BLOODCX No Growth at 24 hrs  08/22/2021   ,   Urinalysis:   Lab Results   Component Value Date    COLORU Yellow 08/07/2020    CLARITYU Clear 08/07/2020    SPECGRAV 1 020 08/07/2020    PHUR 7 5 08/07/2020    LEUKOCYTESUR Negative 08/07/2020    NITRITE Negative 08/07/2020    GLUCOSEU 500 (1/2%) (A) 08/07/2020    KETONESU Negative 08/07/2020    BILIRUBINUR Negative 08/07/2020    BLOODU Trace (A) 08/07/2020   ,   Urine Culture: No results found for: URINECX,   Wound Culure: No results found for: WOUNDCULT    Medications:  Current Facility-Administered Medications   Medication Dose Route Frequency    acetaminophen (TYLENOL) tablet 650 mg  650 mg Oral Q6H PRN    aluminum-magnesium hydroxide-simethicone (MYLANTA) oral suspension 30 mL  30 mL Oral Q6H PRN    atorvastatin (LIPITOR) tablet 40 mg  40 mg Oral Daily With Dinner    ceFAZolin (ANCEF) IVPB (premix in dextrose) 2,000 mg 50 mL  2,000 mg Intravenous Q8H    chlorproMAZINE (THORAZINE) tablet 100 mg  100 mg Oral HS    clopidogrel (PLAVIX) tablet 75 mg  75 mg Oral Daily    docusate sodium (COLACE) capsule 100 mg  100 mg Oral BID PRN    heparin (porcine) 25,000 units in 0 45% NaCl 250 mL infusion (premix)  3-30 Units/kg/hr (Order-Specific) Intravenous Titrated    heparin (porcine) injection 3,800 Units  3,800 Units Intravenous Q1H PRN    heparin (porcine) injection 7,600 Units  7,600 Units Intravenous Q1H PRN    HYDROmorphone (DILAUDID) injection 0 5 mg  0 5 mg Intravenous Q4H PRN    hydrOXYzine HCL (ATARAX) tablet 50 mg  50 mg Oral TID    insulin glargine (LANTUS) subcutaneous injection 30 Units 0 3 mL  30 Units Subcutaneous QAM    insulin lispro (HumaLOG) 100 units/mL subcutaneous injection 1-5 Units  1-5 Units Subcutaneous HS    insulin lispro (HumaLOG) 100 units/mL subcutaneous injection 2-12 Units  2-12 Units Subcutaneous TID AC    insulin lispro (HumaLOG) 100 units/mL subcutaneous injection 5 Units  5 Units Subcutaneous TID With Meals    lamoTRIgine (LaMICtal) tablet 25 mg  25 mg Oral Daily    lurasidone (LATUDA) tablet 80 mg  80 mg Oral Daily With Breakfast    nicotine (NICODERM CQ) 21 mg/24 hr TD 24 hr patch 1 patch  1 patch Transdermal Daily    ondansetron (ZOFRAN) injection 4 mg  4 mg Intravenous Q6H PRN    oxyCODONE (ROXICODONE) immediate release tablet 10 mg  10 mg Oral Q4H PRN    oxyCODONE (ROXICODONE) IR tablet 5 mg  5 mg Oral Q4H PRN    prazosin (MINIPRESS) capsule 1 mg  1 mg Oral HS    QUEtiapine (SEROquel) tablet 300 mg  300 mg Oral Daily    sodium chloride 0 9 % infusion  125 mL/hr Intravenous Continuous    traZODone (DESYREL) tablet 200 mg  200 mg Oral HS       Physical Exam:  General: alert and awake this morning, no acute distress  Left lower extremity: continues to have significant pain along left knee, no purulent drainage, cellulitis improving, Baseline neuropathy unchanged  Motor intact  +PT and DP signal   Right lower extremity: motor and sensation intact  Palpable DP and PT         Mike Austin MD  8/24/2021

## 2021-08-24 NOTE — ASSESSMENT & PLAN NOTE
Lab Results   Component Value Date    HGBA1C 9 5 (H) 08/22/2021   w/ hyperglycemia  Given to NPO decrease dose of lantus by 50%  Cont low dose meal coverage  Cont ISS  Cont to monitor

## 2021-08-25 ENCOUNTER — ANESTHESIA EVENT (INPATIENT)
Dept: RADIOLOGY | Facility: HOSPITAL | Age: 36
DRG: 271 | End: 2021-08-25
Payer: MEDICARE

## 2021-08-25 ENCOUNTER — ANESTHESIA (INPATIENT)
Dept: RADIOLOGY | Facility: HOSPITAL | Age: 36
DRG: 271 | End: 2021-08-25
Payer: MEDICARE

## 2021-08-25 ENCOUNTER — APPOINTMENT (INPATIENT)
Dept: RADIOLOGY | Facility: HOSPITAL | Age: 36
DRG: 271 | End: 2021-08-25
Payer: MEDICARE

## 2021-08-25 LAB
ANION GAP SERPL CALCULATED.3IONS-SCNC: 2 MMOL/L (ref 4–13)
APTT PPP: 57 SECONDS (ref 23–37)
APTT PPP: 59 SECONDS (ref 23–37)
ATRIAL RATE: 66 BPM
BASOPHILS # BLD AUTO: 0.03 THOUSANDS/ΜL (ref 0–0.1)
BASOPHILS NFR BLD AUTO: 1 % (ref 0–1)
BUN SERPL-MCNC: 15 MG/DL (ref 5–25)
CALCIUM SERPL-MCNC: 8.1 MG/DL (ref 8.3–10.1)
CHLORIDE SERPL-SCNC: 108 MMOL/L (ref 100–108)
CO2 SERPL-SCNC: 28 MMOL/L (ref 21–32)
CREAT SERPL-MCNC: 0.43 MG/DL (ref 0.6–1.3)
EOSINOPHIL # BLD AUTO: 0.29 THOUSAND/ΜL (ref 0–0.61)
EOSINOPHIL NFR BLD AUTO: 5 % (ref 0–6)
ERYTHROCYTE [DISTWIDTH] IN BLOOD BY AUTOMATED COUNT: 13 % (ref 11.6–15.1)
GFR SERPL CREATININE-BSD FRML MDRD: 153 ML/MIN/1.73SQ M
GLUCOSE SERPL-MCNC: 110 MG/DL (ref 65–140)
GLUCOSE SERPL-MCNC: 114 MG/DL (ref 65–140)
GLUCOSE SERPL-MCNC: 121 MG/DL (ref 65–140)
GLUCOSE SERPL-MCNC: 134 MG/DL (ref 65–140)
GLUCOSE SERPL-MCNC: 138 MG/DL (ref 65–140)
GRAM STN SPEC: NORMAL
HCT VFR BLD AUTO: 35.2 % (ref 34.8–46.1)
HGB BLD-MCNC: 12.5 G/DL (ref 11.5–15.4)
IMM GRANULOCYTES # BLD AUTO: 0.06 THOUSAND/UL (ref 0–0.2)
IMM GRANULOCYTES NFR BLD AUTO: 1 % (ref 0–2)
LYMPHOCYTES # BLD AUTO: 2.3 THOUSANDS/ΜL (ref 0.6–4.47)
LYMPHOCYTES NFR BLD AUTO: 35 % (ref 14–44)
MCH RBC QN AUTO: 28.7 PG (ref 26.8–34.3)
MCHC RBC AUTO-ENTMCNC: 35.5 G/DL (ref 31.4–37.4)
MCV RBC AUTO: 81 FL (ref 82–98)
MONOCYTES # BLD AUTO: 0.39 THOUSAND/ΜL (ref 0.17–1.22)
MONOCYTES NFR BLD AUTO: 6 % (ref 4–12)
NEUTROPHILS # BLD AUTO: 3.42 THOUSANDS/ΜL (ref 1.85–7.62)
NEUTS SEG NFR BLD AUTO: 52 % (ref 43–75)
NRBC BLD AUTO-RTO: 0 /100 WBCS
P AXIS: 35 DEGREES
PLATELET # BLD AUTO: 339 THOUSANDS/UL (ref 149–390)
PMV BLD AUTO: 10.7 FL (ref 8.9–12.7)
POTASSIUM SERPL-SCNC: 3.7 MMOL/L (ref 3.5–5.3)
PR INTERVAL: 146 MS
QRS AXIS: 66 DEGREES
QRSD INTERVAL: 88 MS
QT INTERVAL: 418 MS
QTC INTERVAL: 435 MS
RBC # BLD AUTO: 4.36 MILLION/UL (ref 3.81–5.12)
SODIUM SERPL-SCNC: 138 MMOL/L (ref 136–145)
T WAVE AXIS: 38 DEGREES
VENTRICULAR RATE: 65 BPM
WBC # BLD AUTO: 6.49 THOUSAND/UL (ref 4.31–10.16)

## 2021-08-25 PROCEDURE — 99222 1ST HOSP IP/OBS MODERATE 55: CPT | Performed by: ORTHOPAEDIC SURGERY

## 2021-08-25 PROCEDURE — G9196 MED REASON FOR NO CEPH: HCPCS | Performed by: RADIOLOGY

## 2021-08-25 PROCEDURE — C1725 CATH, TRANSLUMIN NON-LASER: HCPCS

## 2021-08-25 PROCEDURE — 04CL3ZZ EXTIRPATION OF MATTER FROM LEFT FEMORAL ARTERY, PERCUTANEOUS APPROACH: ICD-10-PCS | Performed by: RADIOLOGY

## 2021-08-25 PROCEDURE — C1769 GUIDE WIRE: HCPCS

## 2021-08-25 PROCEDURE — 94660 CPAP INITIATION&MGMT: CPT

## 2021-08-25 PROCEDURE — NC001 PR NO CHARGE: Performed by: SURGERY

## 2021-08-25 PROCEDURE — C1760 CLOSURE DEV, VASC: HCPCS

## 2021-08-25 PROCEDURE — 85730 THROMBOPLASTIN TIME PARTIAL: CPT | Performed by: INTERNAL MEDICINE

## 2021-08-25 PROCEDURE — 99232 SBSQ HOSP IP/OBS MODERATE 35: CPT | Performed by: SURGERY

## 2021-08-25 PROCEDURE — 87070 CULTURE OTHR SPECIMN AEROBIC: CPT | Performed by: ORTHOPAEDIC SURGERY

## 2021-08-25 PROCEDURE — 75710 ARTERY X-RAYS ARM/LEG: CPT | Performed by: RADIOLOGY

## 2021-08-25 PROCEDURE — 37226 PR REVASCULARIZE FEM/POP ARTERY,ANGIOPLASTY/STENT: CPT | Performed by: RADIOLOGY

## 2021-08-25 PROCEDURE — 93010 ELECTROCARDIOGRAM REPORT: CPT | Performed by: INTERNAL MEDICINE

## 2021-08-25 PROCEDURE — 76937 US GUIDE VASCULAR ACCESS: CPT | Performed by: RADIOLOGY

## 2021-08-25 PROCEDURE — 80048 BASIC METABOLIC PNL TOTAL CA: CPT | Performed by: STUDENT IN AN ORGANIZED HEALTH CARE EDUCATION/TRAINING PROGRAM

## 2021-08-25 PROCEDURE — 82948 REAGENT STRIP/BLOOD GLUCOSE: CPT

## 2021-08-25 PROCEDURE — C1894 INTRO/SHEATH, NON-LASER: HCPCS

## 2021-08-25 PROCEDURE — 75710 ARTERY X-RAYS ARM/LEG: CPT

## 2021-08-25 PROCEDURE — C1874 STENT, COATED/COV W/DEL SYS: HCPCS

## 2021-08-25 PROCEDURE — 37226 HB FEM/POPL REVASC W/STENT: CPT

## 2021-08-25 PROCEDURE — C1887 CATHETER, GUIDING: HCPCS

## 2021-08-25 PROCEDURE — 37184 PRIM ART M-THRMBC 1ST VSL: CPT

## 2021-08-25 PROCEDURE — C1757 CATH, THROMBECTOMY/EMBOLECT: HCPCS

## 2021-08-25 PROCEDURE — 87075 CULTR BACTERIA EXCEPT BLOOD: CPT | Performed by: ORTHOPAEDIC SURGERY

## 2021-08-25 PROCEDURE — 047L34Z DILATION OF LEFT FEMORAL ARTERY WITH DRUG-ELUTING INTRALUMINAL DEVICE, PERCUTANEOUS APPROACH: ICD-10-PCS | Performed by: RADIOLOGY

## 2021-08-25 PROCEDURE — 99222 1ST HOSP IP/OBS MODERATE 55: CPT | Performed by: INTERNAL MEDICINE

## 2021-08-25 PROCEDURE — NC001 PR NO CHARGE: Performed by: ORTHOPAEDIC SURGERY

## 2021-08-25 PROCEDURE — 87205 SMEAR GRAM STAIN: CPT | Performed by: ORTHOPAEDIC SURGERY

## 2021-08-25 PROCEDURE — 94760 N-INVAS EAR/PLS OXIMETRY 1: CPT

## 2021-08-25 PROCEDURE — 87186 SC STD MICRODIL/AGAR DIL: CPT | Performed by: ORTHOPAEDIC SURGERY

## 2021-08-25 PROCEDURE — 0S9D3ZZ DRAINAGE OF LEFT KNEE JOINT, PERCUTANEOUS APPROACH: ICD-10-PCS | Performed by: ORTHOPAEDIC SURGERY

## 2021-08-25 PROCEDURE — 047N34Z DILATION OF LEFT POPLITEAL ARTERY WITH DRUG-ELUTING INTRALUMINAL DEVICE, PERCUTANEOUS APPROACH: ICD-10-PCS | Performed by: RADIOLOGY

## 2021-08-25 PROCEDURE — 37184 PRIM ART M-THRMBC 1ST VSL: CPT | Performed by: RADIOLOGY

## 2021-08-25 PROCEDURE — 85025 COMPLETE CBC W/AUTO DIFF WBC: CPT | Performed by: STUDENT IN AN ORGANIZED HEALTH CARE EDUCATION/TRAINING PROGRAM

## 2021-08-25 RX ORDER — ALBUTEROL SULFATE 2.5 MG/3ML
2.5 SOLUTION RESPIRATORY (INHALATION) ONCE AS NEEDED
Status: DISCONTINUED | OUTPATIENT
Start: 2021-08-25 | End: 2021-08-25 | Stop reason: HOSPADM

## 2021-08-25 RX ORDER — METOCLOPRAMIDE HYDROCHLORIDE 5 MG/ML
10 INJECTION INTRAMUSCULAR; INTRAVENOUS ONCE AS NEEDED
Status: DISCONTINUED | OUTPATIENT
Start: 2021-08-25 | End: 2021-08-25 | Stop reason: HOSPADM

## 2021-08-25 RX ORDER — ONDANSETRON 2 MG/ML
4 INJECTION INTRAMUSCULAR; INTRAVENOUS ONCE AS NEEDED
Status: DISCONTINUED | OUTPATIENT
Start: 2021-08-25 | End: 2021-08-25 | Stop reason: HOSPADM

## 2021-08-25 RX ORDER — FENTANYL CITRATE/PF 50 MCG/ML
25 SYRINGE (ML) INJECTION
Status: DISCONTINUED | OUTPATIENT
Start: 2021-08-25 | End: 2021-08-25 | Stop reason: HOSPADM

## 2021-08-25 RX ORDER — LIDOCAINE HYDROCHLORIDE 10 MG/ML
20 INJECTION, SOLUTION EPIDURAL; INFILTRATION; INTRACAUDAL; PERINEURAL ONCE
Status: COMPLETED | OUTPATIENT
Start: 2021-08-25 | End: 2021-08-25

## 2021-08-25 RX ORDER — FENTANYL CITRATE 50 UG/ML
INJECTION, SOLUTION INTRAMUSCULAR; INTRAVENOUS AS NEEDED
Status: DISCONTINUED | OUTPATIENT
Start: 2021-08-25 | End: 2021-08-25

## 2021-08-25 RX ORDER — KETOROLAC TROMETHAMINE 30 MG/ML
INJECTION, SOLUTION INTRAMUSCULAR; INTRAVENOUS AS NEEDED
Status: DISCONTINUED | OUTPATIENT
Start: 2021-08-25 | End: 2021-08-25

## 2021-08-25 RX ORDER — HEPARIN SODIUM 1000 [USP'U]/ML
INJECTION, SOLUTION INTRAVENOUS; SUBCUTANEOUS AS NEEDED
Status: DISCONTINUED | OUTPATIENT
Start: 2021-08-25 | End: 2021-08-25

## 2021-08-25 RX ORDER — LISINOPRIL 20 MG/1
20 TABLET ORAL DAILY
Status: DISCONTINUED | OUTPATIENT
Start: 2021-08-26 | End: 2021-08-26

## 2021-08-25 RX ORDER — LIDOCAINE HYDROCHLORIDE 10 MG/ML
INJECTION, SOLUTION EPIDURAL; INFILTRATION; INTRACAUDAL; PERINEURAL AS NEEDED
Status: DISCONTINUED | OUTPATIENT
Start: 2021-08-25 | End: 2021-08-25

## 2021-08-25 RX ORDER — MIDAZOLAM HYDROCHLORIDE 2 MG/2ML
INJECTION, SOLUTION INTRAMUSCULAR; INTRAVENOUS AS NEEDED
Status: DISCONTINUED | OUTPATIENT
Start: 2021-08-25 | End: 2021-08-25

## 2021-08-25 RX ORDER — ONDANSETRON 2 MG/ML
INJECTION INTRAMUSCULAR; INTRAVENOUS AS NEEDED
Status: DISCONTINUED | OUTPATIENT
Start: 2021-08-25 | End: 2021-08-25

## 2021-08-25 RX ORDER — SODIUM CHLORIDE 9 MG/ML
INJECTION, SOLUTION INTRAVENOUS CONTINUOUS PRN
Status: DISCONTINUED | OUTPATIENT
Start: 2021-08-25 | End: 2021-08-25

## 2021-08-25 RX ORDER — HYDROMORPHONE HCL/PF 1 MG/ML
SYRINGE (ML) INJECTION AS NEEDED
Status: DISCONTINUED | OUTPATIENT
Start: 2021-08-25 | End: 2021-08-25

## 2021-08-25 RX ORDER — PROPOFOL 10 MG/ML
INJECTION, EMULSION INTRAVENOUS AS NEEDED
Status: DISCONTINUED | OUTPATIENT
Start: 2021-08-25 | End: 2021-08-25

## 2021-08-25 RX ORDER — PROPOFOL 10 MG/ML
INJECTION, EMULSION INTRAVENOUS CONTINUOUS PRN
Status: DISCONTINUED | OUTPATIENT
Start: 2021-08-25 | End: 2021-08-25

## 2021-08-25 RX ADMIN — OXYCODONE HYDROCHLORIDE 5 MG: 5 TABLET ORAL at 05:28

## 2021-08-25 RX ADMIN — HEPARIN SODIUM 2000 UNITS: 1000 INJECTION INTRAVENOUS; SUBCUTANEOUS at 18:58

## 2021-08-25 RX ADMIN — ONDANSETRON 4 MG: 2 INJECTION INTRAMUSCULAR; INTRAVENOUS at 17:09

## 2021-08-25 RX ADMIN — ALTEPLASE 5 MG: 2.2 INJECTION, POWDER, LYOPHILIZED, FOR SOLUTION INTRAVENOUS at 17:06

## 2021-08-25 RX ADMIN — FENTANYL CITRATE 25 MCG: 50 INJECTION INTRAMUSCULAR; INTRAVENOUS at 16:16

## 2021-08-25 RX ADMIN — PROPOFOL 150 MG: 10 INJECTION, EMULSION INTRAVENOUS at 15:54

## 2021-08-25 RX ADMIN — CHLORPROMAZINE HYDROCHLORIDE 100 MG: 25 TABLET, FILM COATED ORAL at 22:13

## 2021-08-25 RX ADMIN — FENTANYL CITRATE 25 MCG: 50 INJECTION INTRAMUSCULAR; INTRAVENOUS at 17:18

## 2021-08-25 RX ADMIN — HYDROMORPHONE HYDROCHLORIDE 0.5 MG: 1 INJECTION, SOLUTION INTRAMUSCULAR; INTRAVENOUS; SUBCUTANEOUS at 17:46

## 2021-08-25 RX ADMIN — ACETAMINOPHEN 650 MG: 325 TABLET, FILM COATED ORAL at 22:21

## 2021-08-25 RX ADMIN — NICOTINE 1 PATCH: 21 PATCH, EXTENDED RELEASE TRANSDERMAL at 08:32

## 2021-08-25 RX ADMIN — VANCOMYCIN HYDROCHLORIDE 1500 MG: 5 INJECTION, POWDER, LYOPHILIZED, FOR SOLUTION INTRAVENOUS at 03:19

## 2021-08-25 RX ADMIN — SODIUM CHLORIDE: 0.9 INJECTION, SOLUTION INTRAVENOUS at 19:29

## 2021-08-25 RX ADMIN — HYDROMORPHONE HYDROCHLORIDE 0.2 MG: 0.2 INJECTION, SOLUTION INTRAMUSCULAR; INTRAVENOUS; SUBCUTANEOUS at 10:26

## 2021-08-25 RX ADMIN — LIDOCAINE HYDROCHLORIDE 20 ML: 10 INJECTION, SOLUTION EPIDURAL; INFILTRATION; INTRACAUDAL at 08:58

## 2021-08-25 RX ADMIN — QUETIAPINE FUMARATE 300 MG: 100 TABLET ORAL at 22:12

## 2021-08-25 RX ADMIN — OXYCODONE HYDROCHLORIDE 5 MG: 5 TABLET ORAL at 09:33

## 2021-08-25 RX ADMIN — PHENYLEPHRINE HYDROCHLORIDE 30 MCG/MIN: 10 INJECTION INTRAVENOUS at 15:56

## 2021-08-25 RX ADMIN — KETOROLAC TROMETHAMINE 30 MG: 30 INJECTION, SOLUTION INTRAMUSCULAR; INTRAVENOUS at 19:35

## 2021-08-25 RX ADMIN — CLOPIDOGREL BISULFATE 75 MG: 75 TABLET ORAL at 08:31

## 2021-08-25 RX ADMIN — HYDROMORPHONE HYDROCHLORIDE 0.2 MG: 0.2 INJECTION, SOLUTION INTRAMUSCULAR; INTRAVENOUS; SUBCUTANEOUS at 05:57

## 2021-08-25 RX ADMIN — FENTANYL CITRATE 25 MCG: 50 INJECTION INTRAMUSCULAR; INTRAVENOUS at 15:57

## 2021-08-25 RX ADMIN — ALTEPLASE 10 MG: 2.2 INJECTION, POWDER, LYOPHILIZED, FOR SOLUTION INTRAVENOUS at 16:58

## 2021-08-25 RX ADMIN — LAMOTRIGINE 25 MG: 25 TABLET ORAL at 08:31

## 2021-08-25 RX ADMIN — LURASIDONE HYDROCHLORIDE 80 MG: 80 TABLET, FILM COATED ORAL at 07:11

## 2021-08-25 RX ADMIN — HYDROMORPHONE HYDROCHLORIDE 0.5 MG: 1 INJECTION, SOLUTION INTRAMUSCULAR; INTRAVENOUS; SUBCUTANEOUS at 17:51

## 2021-08-25 RX ADMIN — SODIUM CHLORIDE: 0.9 INJECTION, SOLUTION INTRAVENOUS at 15:44

## 2021-08-25 RX ADMIN — PHENYTOIN 1 MG: 125 SUSPENSION ORAL at 22:13

## 2021-08-25 RX ADMIN — HEPARIN SODIUM 3800 UNITS: 1000 INJECTION INTRAVENOUS; SUBCUTANEOUS at 07:10

## 2021-08-25 RX ADMIN — FENTANYL CITRATE 25 MCG: 50 INJECTION INTRAMUSCULAR; INTRAVENOUS at 17:06

## 2021-08-25 RX ADMIN — NICOTINE 1 PATCH: 21 PATCH, EXTENDED RELEASE TRANSDERMAL at 05:41

## 2021-08-25 RX ADMIN — VANCOMYCIN HYDROCHLORIDE 1500 MG: 5 INJECTION, POWDER, LYOPHILIZED, FOR SOLUTION INTRAVENOUS at 17:35

## 2021-08-25 RX ADMIN — IODIXANOL 150 ML: 320 INJECTION, SOLUTION INTRAVASCULAR at 19:58

## 2021-08-25 RX ADMIN — VANCOMYCIN HYDROCHLORIDE 1500 MG: 5 INJECTION, POWDER, LYOPHILIZED, FOR SOLUTION INTRAVENOUS at 09:34

## 2021-08-25 RX ADMIN — LIDOCAINE HYDROCHLORIDE 50 MG: 10 INJECTION, SOLUTION EPIDURAL; INFILTRATION; INTRACAUDAL; PERINEURAL at 15:54

## 2021-08-25 RX ADMIN — MIDAZOLAM 2 MG: 1 INJECTION INTRAMUSCULAR; INTRAVENOUS at 15:51

## 2021-08-25 RX ADMIN — TRAZODONE HYDROCHLORIDE 200 MG: 100 TABLET ORAL at 22:13

## 2021-08-25 RX ADMIN — PROPOFOL 50 MCG/KG/MIN: 10 INJECTION, EMULSION INTRAVENOUS at 15:56

## 2021-08-25 RX ADMIN — HEPARIN SODIUM 5000 UNITS: 1000 INJECTION INTRAVENOUS; SUBCUTANEOUS at 17:56

## 2021-08-25 NOTE — PROGRESS NOTES
[facility]  Progress Note - Leobardo Cristina 1985, 30 y o  female MRN: [id number]  Unit/Bed#: -Yanick Encounter: [id number]  Primary Care Provider: Carlos Zuleta DO   Date and time admitted to hospital: 8/22/2021  8:58 PM     DOS: 8/25/2021  * Acute pain of left knee  Assessment & Plan  · Pt presented with worsening pain of the left knee, redness and drainage   · S/p CT LE revealing:  Skin thickening overlying the prepatellar soft tissues which demonstrates underlying focal subcutaneous stranding which may be due to cellulitis or early prepatellar bursitis  No evidence of discrete fluid collection to suggest drainable abscess  No evidence of knee joint effusion  · Likely in setting of underlying cellulitis, possible joint involvement    · Currently on IV Vancomycin Q8H, pharmacy consultation for dosing   · Ortho following,  · Pt is s/p left knee prepatellar bursa aspiration  · Follow up culture results and titrate abx based on this  · MRSA swab nares positive  · Blood cultures negative x 48 hours, pt not meeting SIRS or sepsis criteria   · Arterial duplex with evidence of SFA stent occlusion and popliteal stenosis   · PRN pain management  · Vascular following, pt for IR angiogram with possible intervention today    Peripheral artery disease ([facility] )  Assessment & Plan  · Hx of nonhealing left 5th toe ulcer s/p LLE angiogram with L SFA stent placement on 8/11/21     · Arterial duplex performed here with evidence of SFA stent occlusion and popliteal stenosis   · Vascular surgery following,  · Plan for IR angiogram with possible intervention today   · Continue IVF hydration at 75 cc/hr  · Pulmonary consulted for clearance from respiratory standpoint for anesthesia   · Continue IV heparin gtt, plavix and statin per vascular     Bipolar disorder ([facility] )  Assessment & Plan  · Mood currently appears stable  · However noted to have intermittent waxing and waning mentation here with increased somnolence and lethargy, per nursing report pt was much more alert today after medication adjustments as below   · Pt has not utilized a significant amount of narcotics here, continue decreased PRN IV dilaudid to 0 2 mg Q4H for severe pain with PRN oxycodone 5 mg for severe pain   · Transition pt from scheduled atarax to PRN for anxiety and switch Seroquel 300 mg to QHS rather than in the AM  · Likely combination of significant psych medications in addition to narcotics for pain control   · Continue Thorazine 100 mg QHS, Latuda 80 mg daily, Trazodone 200 mg daily  · Monitor    Nicotine dependence  Assessment & Plan  · Continue nicotine replacement  · Encourage cessation    COPD (chronic obstructive pulmonary disease) ([facility])  Assessment & Plan  · Appreciate input per pulmonary, s/p perioperative assessment  · No PFT on file, unclear copd hx  · Regardless no signs of exacerbation, no indication for steroids at this time  · Ordered cpap qhs  · Pulm toilet  · PFT as outpatient    Type 2 diabetes mellitus with diabetic polyneuropathy, with long-term current use of insulin ([facility] )  Assessment & Plan    Lab Results   Component Value Date    HGBA1C 9 9 (H) 08/24/2021   · Appears uncontrolled as an outpatient as evidenced by hgbA1c  · Endocrinology following,  · Continue Lantus 30 units with scheduled humalog 5 units TID with meals   · Continue SSI coverage   · QID glucose checks  · Consistent carb diet   · Monitor and adjust regimen as needed    Hypertension  Assessment & Plan  · Initially presenting w/ htn urgency, now significantly improved   · Continue prazosin 1 mg QHS  · Resume lisinopril 20 mg daily tomorrow  · Monitor     Attempted to round on patient several times throughout the day, however pt was down in IR for LLE angiogram for several hours  Unable to evaluate pt at bedside due to this  Did discuss with patient's nurse regarding plan of care   Pt noted to be more alert throughout today upon discussion with nursing staff

## 2021-08-25 NOTE — ASSESSMENT & PLAN NOTE
· Pt presented with worsening pain of the left knee, redness and drainage   · S/p CT LE revealing:  Skin thickening overlying the prepatellar soft tissues which demonstrates underlying focal subcutaneous stranding which may be due to cellulitis or early prepatellar bursitis  No evidence of discrete fluid collection to suggest drainable abscess  No evidence of knee joint effusion    · Likely in setting of underlying cellulitis, possible joint involvement    · Currently on IV Vancomycin Q8H, pharmacy consultation for dosing   · Ortho following,  · Pt is s/p left knee prepatellar bursa aspiration  · Follow up culture results and titrate abx based on this  · MRSA swab nares positive  · Blood cultures negative x 48 hours, pt not meeting SIRS or sepsis criteria   · Arterial duplex with evidence of SFA stent occlusion and popliteal stenosis   · PRN pain management  · Vascular following, pt for IR angiogram with possible intervention today

## 2021-08-25 NOTE — ASSESSMENT & PLAN NOTE
· Initially presenting w/ htn urgency, now significantly improved   · Continue prazosin 1 mg QHS  · Resume lisinopril 20 mg daily tomorrow  · Monitor

## 2021-08-25 NOTE — PROGRESS NOTES
[address] [facility]  Progress Note - Abimael Christiansen 1985, 30 y o  female MRN: [id number]  Unit/Bed#: -01 Encounter: [id number]  Primary Care Provider: Serina Gitelman, DO   Date and time admitted to hospital: 8/22/2021  8:58 PM    Peripheral artery disease [facility])  Assessment & Plan  76-year-old female with PMHx significant for DM, COPD, HTN, h/o TIA, Bipolar with nonhealing left 5th toe ulcer s/p LLE angiogram with L SFA stent placement on 8/11/21 (primary runoff PT and peroneal) presenting with primary complaints of left knee pain, redness and purulent drainage  Work-up included arterial duplex with evidence of SFA stent occlusion and popliteal stenosis  Her primary complaints are concerning for either superficial abscess or underlying infectious process of the knee  This is unrelated to SFA stent occlusion and currently has no evidence of acute limb ischemia requiring emergent intervention at this time but will require angiogram       Plan:  -Continue Plavix, hep gtt, statin   -Angiogram with IR today   -Cleared by pulmonology for anesthesia   -[email or web address], [email or web address]  -Continue antibiotics per primary team  -Vascular surgery following      8/25 - Afebrile/Cre: 0 43/Hgb 12 5/WBC 6 49/PTT 57  8/23 - INR 1 01    SUBJECTIVE:    No acute overnight events  Patient endorsing continued L knee pain  More alert this morning after pain regimen changed    Denies N/V/CP/SOB/fevers/chills      OBJECTIVE:    Vitals:    08/24/21 2225   BP: 144/90   Pulse: 90   Resp: 20   Temp: 98 6 °F (37 °C)   SpO2: 97%       Current Vitals:   Blood Pressure: 144/90 (08/24/21 2225)  Pulse: 90 (08/24/21 2225)  Temperature: 98 6 °F (37 °C) (08/24/21 2225)  Temp Source: Oral (08/24/21 2225)  Respirations: 20 (08/24/21 2225)  Height: 5' 1" (154 9 cm) (08/23/21 0801)  Weight - Scale: 104 kg (228 lb 2 8 oz) (08/25/21 0555)  SpO2: 97 % (08/24/21 2225)    I/O       08/23 0701 - 08/24 0700 08/24 0701 - 08/25 0700 08/25 0701 - 08/26 0700    P  O  240 1060     I V  (mL/kg) 2937 4 (29 8) 2514 6 (24 4)     IV Piggyback 50 121 7     Total Intake(mL/kg) 3227 4 (32 7) 3696 3 (35 9)     Urine (mL/kg/hr) 600 (0 3)      Total Output 600      Net +2627 4 +3696 3                    Meds/Allergies   current meds:   Current Facility-Administered Medications   Medication Dose Route Frequency    acetaminophen (TYLENOL) tablet 650 mg  650 mg Oral Q6H PRN    aluminum-magnesium hydroxide-simethicone (MYLANTA) oral suspension 30 mL  30 mL Oral Q6H PRN    atorvastatin (LIPITOR) tablet 40 mg  40 mg Oral Daily With Dinner    chlorproMAZINE (THORAZINE) tablet 100 mg  100 mg Oral HS    clopidogrel (PLAVIX) tablet 75 mg  75 mg Oral Daily    docusate sodium (COLACE) capsule 100 mg  100 mg Oral BID PRN    heparin (porcine) 25,000 units in 0 45% NaCl 250 mL infusion (premix)  3-30 Units/kg/hr (Order-Specific) Intravenous Titrated    heparin (porcine) injection 3,800 Units  3,800 Units Intravenous Q1H PRN    heparin (porcine) injection 7,600 Units  7,600 Units Intravenous Q1H PRN    HYDROmorphone HCl (DILAUDID) injection 0 2 mg  0 2 mg Intravenous Q4H PRN    hydrOXYzine HCL (ATARAX) tablet 50 mg  50 mg Oral TID PRN    insulin glargine (LANTUS) subcutaneous injection 30 Units 0 3 mL  30 Units Subcutaneous QAM    insulin lispro (HumaLOG) 100 units/mL subcutaneous injection 1-5 Units  1-5 Units Subcutaneous HS    insulin lispro (HumaLOG) 100 units/mL subcutaneous injection 2-12 Units  2-12 Units Subcutaneous TID AC    insulin lispro (HumaLOG) 100 units/mL subcutaneous injection 5 Units  5 Units Subcutaneous TID With Meals    lamoTRIgine (LaMICtal) tablet 25 mg  25 mg Oral Daily    lidocaine (PF) (XYLOCAINE-MPF) 1 % injection 20 mL  20 mL Infiltration Once    lurasidone (LATUDA) tablet 80 mg  80 mg Oral Daily With Breakfast    nicotine (NICODERM CQ) 21 mg/24 hr TD 24 hr patch 1 patch  1 patch Transdermal Daily    ondansetron (ZOFRAN) injection 4 mg  4 mg Intravenous Q6H PRN    oxyCODONE (ROXICODONE) IR tablet 5 mg  5 mg Oral Q4H PRN    prazosin (MINIPRESS) capsule 1 mg  1 mg Oral HS    QUEtiapine (SEROquel) tablet 300 mg  300 mg Oral HS    sodium chloride 0 9 % infusion  75 mL/hr Intravenous Continuous    traZODone (DESYREL) tablet 200 mg  200 mg Oral HS    vancomycin (VANCOCIN) 1500 mg in sodium chloride 0 9% 250 mL IVPB  15 mg/kg Intravenous Q8H     No Known Allergies          Physical Exam:    Gen: No acute distress  CV: Warm, regular rate, hypertensive   Pulm: Normal work of breathing, no respiratory distress   Abd: Soft, nondistended  Ext: L knee with reduced erythema and swelling from prior, small punctate lesion to anterior knee, tender to palpation  Neuro: A&O, moving all extremities, m/s intact, nonpalpable L DP/PT      Labs:    Lab Results   Component Value Date    WBC 6 49 08/25/2021    HGB 12 5 08/25/2021    HCT 35 2 08/25/2021    MCV 81 (L) 08/25/2021     08/25/2021        Imaging:    X-ray chest 1 view portable    Result Date: 8/23/2021  Impression: No acute cardiopulmonary disease  Workstation performed: [id number]     CT lower extremity w contrast left    Result Date: 8/23/2021  Impression: 1  Skin thickening overlying the prepatellar soft tissues which demonstrates underlying focal subcutaneous stranding which may be due to cellulitis or early prepatellar bursitis  No evidence of discrete fluid collection to suggest drainable abscess  No evidence of knee joint effusion   Workstation performed: [id number]             Wendy Valdes MD   PGY1 General Surgery

## 2021-08-25 NOTE — ASSESSMENT & PLAN NOTE
Lab Results   Component Value Date    HGBA1C 9 9 (H) 08/24/2021   · Appears uncontrolled as an outpatient as evidenced by hgbA1c  · Endocrinology following,  · Continue Lantus 30 units with scheduled humalog 5 units TID with meals   · Continue SSI coverage   · QID glucose checks  · Consistent carb diet   · Monitor and adjust regimen as needed

## 2021-08-25 NOTE — ASSESSMENT & PLAN NOTE
Lab Results   Component Value Date    HGBA1C 9 9 (H) 08/24/2021       Recent Labs     08/24/21  1559 08/24/21  2057 08/25/21  0627 08/25/21  1053   POCGLU 91 198* 138 110     Uncontrolled with the most recent A1c of 9 9%  The goal is less than 7%  Home regimen consists of Lantus 30 units q h s , Humalog with correctional insulin, Trulicity 1 5 mg once weekly, metformin 1 g b i d  Currently on Lantus 30 units q h s  And Humalog 5 units t i d  A c, however patient's blood sugar are above target  We increased Lantus to 45 units q h s  And Humalog 15 units t i d  A c  Continue correctional insulin    Continue to monitor blood sugar over time and make adjustments to the regimen if necessary

## 2021-08-25 NOTE — PHYSICAL THERAPY NOTE
Physical Therapy Cancellation Note       08/25/21 1108   PT Last Visit   PT Visit Date 08/25/21   Note Type   Note type Evaluation   Cancel Reasons Medical status     PT orders received, chart reviewed  Pt admit with acute pain in L knee  Pt is s/p aspiration with ortho  Pt pending x-ray read of L knee  PT to continue to follow and see pt as appropriate and able      Kiran Desai, PT, DPT

## 2021-08-25 NOTE — BRIEF OP NOTE (RAD/CATH)
INTERVENTIONAL RADIOLOGY PROCEDURE NOTE    Date: 8/25/2021    Procedure: IR LOWER EXTREMITY ANGIOGRAM    Preoperative diagnosis:   1  Atherosclerosis 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 ([address] )    2  Left foot pain    3  Cellulitis    4  Acute pain of left knee    5  Type 2 diabetes mellitus with diabetic polyneuropathy, with long-term current use of insulin (HCC)         Postoperative diagnosis: Same  Surgeon: MD Joana Laurent MD    Blood loss: 350 mL    Specimens: None     Findings:   1  Successful recanalization of occluded left SFA stent with thrombectomy  2  There were chronic appearing stenoses at the proximal and distal ends of the stent which were angioplastied using 6 mm balloon  3  Residual clots and stenoses noted at distal end of stent  4  Stent was extended distally into the proximal popliteal artery using 6 mm x 120 mm Viola stent  5  There remained dominant flow to left foot through the PT  Peroneal appeared diminutive  AT was chronically occluded  Complications: None immediate  Anesthesia: local and general                   Vascular Quality Initiative - Peripheral Vascular Intervention     Urgency: Urgent    Functional Status:  Fully active; able to carry on all predisease activities without restriction     Ambulation: Amb = independently ambulatory    Leg Symptoms    Right: Asymptomatic:  documented peripheral arterial disease without symptoms of claudication or ischemic pain      Treatment of Native Artery to Maintain Bypass Patency?:  No  Left: Mild Claudication:  ischemic limb muscle pain that does not limit walking or limits walking only after > 2 blocks (>600 feet or 2 football fields)    COVID Information  COVID Symptoms Pre-Procedure: Asymptomatic    Treatment Delayed by Pandemic: None    Access   Number of Sites: 1     Access Site 1:     Side 1: Right    Site 1: Femoral Retrograde    Access Guidance 1:U/S    Largest Sheath Size 1: 6 Fr     Closure Device 1: Perclose      Number of Closure Devices: 1     Closure Device Outcome: Closure device successful         Procedure  Fluoro Time: 52 3 minutes  Contrast Volume: Visipaque 150 ml  DAP: 199 mGy  CO2: no  Anticoagulant: Heparin  Protamine: No  If Creatinine is > 1 2 or missing, ASHLEY Prophylaxis none     Treatment Details  Indication: Occlusive Disease,    Completion Assessment  Artery 1 treated: SFA        Left               Outflow: AT,PT,Peroneal: 2                  Was this Site previously treated?: Yes, Stent          TASC Grade: C          Total Treated Length: 30 cm          Total Occluded Length: 20 cm          Calcification: None (no calcification visible on fluoroscopic, CT or IVUS imaging)          Number of Treatment types (Devices): 2           Device 1          Treatment Type: Plain Balloon         Device 2          Treatment Type: Stent,  Drug Eluting Stent                Diameter: 6 mm          Length: 120 mm            Concomitant:  Thrombolysis, Pharmacologic, Thrombectomy (Angiojet, Penumbra)   Planned, Current Procedure          Technical result: Successful (stenosis <=30%)      None     Post Procedure  Procedure Complications: No

## 2021-08-25 NOTE — ANESTHESIA POSTPROCEDURE EVALUATION
Post-Op Assessment Note    CV Status:  Stable  Pain Score: 0    Pain management: adequate     Mental Status:  Arousable and sleepy   Hydration Status:  Euvolemic   PONV Controlled:  Controlled   Airway Patency:  Patent      Post Op Vitals Reviewed: Yes      Staff: CRNA         No complications documented      BP  109/86   Temp 97 6   Pulse 99   Resp 17   SpO2 100%

## 2021-08-25 NOTE — OCCUPATIONAL THERAPY NOTE
Occupational Therapy Cancellation Note       08/25/21 1234   OT Last Visit   OT Visit Date 08/25/21   Note Type   Note type Evaluation   Cancel Reasons Medical status       Orders received, chart reviewed  Pt currently pending read of X-ray L knee  Pt also planned for LLE angiogram w/ intervention today  OT to continue to follow and attempt evaluation as able and as medically appropriate        Awa Rojo, OT

## 2021-08-25 NOTE — PROGRESS NOTES
Vancomycin IV Pharmacy-to-Dose Consultation    Kim Duvall is a 30 y o  female who is currently receiving Vancomycin IV for knee infection with management by the Pharmacy Consult service  Assessment/Plan:  The patient was reviewed  Renal function is stable/improving and no signs or symptoms of nephrotoxicity and/or infusion reactions were documented in the chart  Based on todays assessment, continue current vancomycin (day #2) dosing of 1500mg q8h, with a plan for trough to be drawn at 0930 on 8/26 prior to the 5th dose  We will continue to follow the patients culture results and clinical progress daily      Ángel Tamez, Pharmacist

## 2021-08-25 NOTE — ASSESSMENT & PLAN NOTE
67-year-old female with PMHx significant for DM, COPD, HTN, h/o TIA, Bipolar with nonhealing left 5th toe ulcer s/p LLE angiogram with L SFA stent placement on 8/11/21 (primary runoff PT and peroneal) presenting with primary complaints of left knee pain, redness and purulent drainage  Work-up included arterial duplex with evidence of SFA stent occlusion and popliteal stenosis  Her primary complaints are concerning for either superficial abscess or underlying infectious process of the knee   This is unrelated to SFA stent occlusion and currently has no evidence of acute limb ischemia requiring emergent intervention at this time but will require angiogram       Plan:  -Continue Plavix, hep gtt, statin   -Angiogram with IR today   -Cleared by pulmonology for anesthesia   -[email or web address]  -Continue antibiotics per primary team  -Vascular surgery following

## 2021-08-25 NOTE — PROGRESS NOTES
Progress Note - Orthopedics   Alexandre Gant 30 y o  female MRN: [id number]  Unit/Bed#: [facility] 577-01      Subjective:    30 y  o female with left knee prepatellar bursitis  Continues to endorse left knee pain but reports no significant progression in severity  Pain controlled   Denies fevers chills, CP, SOB    Labs:  0   Lab Value Date/Time    HCT 35 8 08/24/2021 0554    HCT 34 9 08/23/2021 0616    HCT 40 5 08/22/2021 1518    HGB 12 7 08/24/2021 0554    HGB 12 6 08/23/2021 0616    HGB 14 9 08/22/2021 1518    INR 1 01 08/23/2021 0616    WBC 6 71 08/24/2021 0554    WBC 8 70 08/23/2021 0616    WBC 8 06 08/22/2021 1518       Meds:    Current Facility-Administered Medications:     acetaminophen (TYLENOL) tablet 650 mg, 650 mg, Oral, Q6H PRN, Wayne Massey MD    aluminum-magnesium hydroxide-simethicone (MYLANTA) oral suspension 30 mL, 30 mL, Oral, Q6H PRN, Wayne Massey MD    atorvastatin (LIPITOR) tablet 40 mg, 40 mg, Oral, Daily With Thelma Avilez MD, 40 mg at 08/24/21 1723    chlorproMAZINE (THORAZINE) tablet 100 mg, 100 mg, Oral, HS, Wayne Massey MD, 100 mg at 08/24/21 2223    clopidogrel (PLAVIX) tablet 75 mg, 75 mg, Oral, Daily, Scot Sanz MD, 75 mg at 08/24/21 0800    docusate sodium (COLACE) capsule 100 mg, 100 mg, Oral, BID PRN, Wayne Massey MD, 100 mg at 08/23/21 0005    heparin (porcine) 25,000 units in 0 45% NaCl 250 mL infusion (premix), 3-30 Units/kg/hr (Order-Specific), Intravenous, Titrated, Wayne Massey MD, Last Rate: 10 1 mL/hr at 08/24/21 1925, 10 6 Units/kg/hr at 08/24/21 1925    heparin (porcine) injection 3,800 Units, 3,800 Units, Intravenous, Q1H PRN, Wayne Massey MD    heparin (porcine) injection 7,600 Units, 7,600 Units, Intravenous, Q1H PRN, Wayne Massey MD    HYDROmorphone HCl (DILAUDID) injection 0 2 mg, 0 2 mg, Intravenous, Q4H PRN, Curry Beltran PA-C, 0 2 mg at 08/25/21 0557    hydrOXYzine HCL (ATARAX) tablet 50 mg, 50 mg, Oral, TID PRN, Aloysius Schilder, PA-C    insulin glargine (LANTUS) subcutaneous injection 30 Units 0 3 mL, 30 Units, Subcutaneous, QAM, Sharlet Borne, DO, 30 Units at 08/24/21 0807    insulin lispro (HumaLOG) 100 units/mL subcutaneous injection 1-5 Units, 1-5 Units, Subcutaneous, HS, Sharlet Borne, DO, 1 Units at 08/24/21 2223    insulin lispro (HumaLOG) 100 units/mL subcutaneous injection 2-12 Units, 2-12 Units, Subcutaneous, TID AC, 2 Units at 08/24/21 1248 **AND** Fingerstick Glucose (POCT), , , TID AC, Aaliyah Agudelo DO    insulin lispro (HumaLOG) 100 units/mL subcutaneous injection 5 Units, 5 Units, Subcutaneous, TID With Meals, Sharlet Borne, DO, 5 Units at 08/24/21 1723    lamoTRIgine (LaMICtal) tablet 25 mg, 25 mg, Oral, Daily, Wayne Massey MD    lurasidone (LATUDA) tablet 80 mg, 80 mg, Oral, Daily With Breakfast, Sharlet Borne, DO, 80 mg at 08/24/21 0751    nicotine (NICODERM CQ) 21 mg/24 hr TD 24 hr patch 1 patch, 1 patch, Transdermal, Daily, Wayne Massey MD, 1 patch at 08/25/21 0541    ondansetron (ZOFRAN) injection 4 mg, 4 mg, Intravenous, Q6H PRN, Wayne Massey MD    oxyCODONE (ROXICODONE) IR tablet 5 mg, 5 mg, Oral, Q4H PRN, Curry Beltran PA-C, 5 mg at 08/25/21 9839    prazosin (MINIPRESS) capsule 1 mg, 1 mg, Oral, HS, Wayne Massey MD, 1 mg at 08/24/21 2223    QUEtiapine (SEROquel) tablet 300 mg, 300 mg, Oral, HS, Kaya Beltran PA-C    sodium chloride 0 9 % infusion, 75 mL/hr, Intravenous, Continuous, Kaya Beltran PA-C, Last Rate: 75 mL/hr at 08/24/21 1600, 75 mL/hr at 08/24/21 1600    traZODone (DESYREL) tablet 200 mg, 200 mg, Oral, HS, Wayne Massey MD, 200 mg at 08/23/21 0005    vancomycin (VANCOCIN) 1500 mg in sodium chloride 0 9% 250 mL IVPB, 15 mg/kg, Intravenous, Q8H, Kaya Beltran PA-C, Stopped at 08/25/21 0446    Blood Culture:   Lab Results   Component Value Date    BLOODCX No Growth at 24 hrs  08/22/2021 BLOODCX No Growth at 24 hrs  08/22/2021       Wound Culture:   No results found for: WOUNDCULT    Ins and Outs:  I/O last 24 hours: In: 5453 9 [P O :1060; I V :4222 2; IV Piggyback:171 7]  Out: 0           Physical:  Vitals:    08/24/21 2225   BP: 144/90   Pulse: 90   Resp: 20   Temp: 98 6 °F (37 °C)   SpO2: 97%     Musculoskeletal: left Lower Extremity  · Mild erythema over superior aspect of patella is which is improved   · Palpable superficial fluid collection,  Approximately 1 5 cm  · TTP over patella   · Nontender over medial or lateral joint line   · No effusion   · SILT s/s/sp/dp/t    · + Knee flexion/extension is+fhl/ehl, +ankle dorsi/plantar flexion  · Palpable DP and PT pulses    Assessment:    30 y  o female with left knee prepatellar bursitis  Will aspirate at bedside today and continue to monitor clinical exam on IV abx  Plan:  · WBAT LLE   · Aspiration bedside with cultures  · Cont   Abx per primary   · Will monitor clinical exam   · Pain control   · Medical management per primary   · Dispo: Ortho will follow    Darcy Almaguer MD

## 2021-08-25 NOTE — PROCEDURES
Procedure- Orthopedics   Blaine Clark 30 y o  female MRN: [id number]  Unit/Bed#: [facility] 577-01    Procedure: left knee prepatellar bursa aspiration    After sterile preparation of the skin overlying the knee local anesthetic was provided with 5cc of 1% lidocaine  An 18 gauge needle was inserted into the prepatellar bursa  Approx 4cc of seropurulent fluid was aspirated and sent for gram stain and culture  Sterile dressing was then applied  Pt tolerated the procedure well       Marii Cortés MD

## 2021-08-25 NOTE — CONSULTS
Consultation - Deepti Rodriguez 30 y o  female MRN: [id number]    Unit/Bed#: -01 Encounter: [id number]      Assessment/Plan     Peripheral artery disease [facility])  Assessment & Plan  Recent  left SFA stent and now with stent occlusion  Management per primary team      Type 2 diabetes mellitus with diabetic polyneuropathy, with long-term current use of insulin [facility])  Assessment & Plan  Lab Results   Component Value Date    HGBA1C 9 9 (H) 08/24/2021       Recent Labs     08/24/21  1559 08/24/21  2057 08/25/21  0627 08/25/21  1053   POCGLU 91 198* 138 110     Uncontrolled with the most recent A1c of 9 9%  The goal is less than 7%  Home regimen consists of Lantus 30 units q h s , Humalog with correctional insulin, Trulicity 1 5 mg once weekly, metformin 1 g b i d  Not perform home glucose monitoring,   Patient is on medications include gabapentin, trazodone and srequel which might be contributing in weight gain and increasing appetite, causing hyperglycemia and uncontrolled diabetes, she will need to discussed with PCP and psychiatrist for possible alternatives  Currently on Lantus 30 units q h s  And Humalog 5 units t i d  A c  was added yesterday which helped improving blood sugars  Will continue current management Continue to monitor blood sugar over time and make adjustments to the regimen if necessary          CC: Diabetes Consult    History of Present Illness     HPI: Deepti Rodriguez is a 30y o  year old female with type 2 diabetes, Bipolar disorder, HTN, COPD, non healing left 5th toe ulcer s/p left SFA stent who presented for knee pain and redness and is admitted for for stent occlusion  Endocrinology consult requested to assist with diabetes management  Patient was seen and examined at bedside, she reports T2D for more than 20 years, home regimen consist of Lantus 30 units q a m and Humalog with correctional insulin, Metformin 1000 mg bid and Trulucity 1 5 mg once weekly     She was not performing home glucose monitoring as her glucometer was broken, most recent A1C of 9 9 %  Diabetes is managed by PCP,  Diabetes complicated by neuropathy, PAD, TID, denies nephropathy,   Has not seen ophthalmology for evaluation for retinopathy  Patient is currently on Lantus 30 units q h s, and Humalog 5 units TID  A C, she is currently NPO for LLE angiogram              Inpatient consult to Endocrinology     Performed by  Mary Rosenthal MD     Authorized by Arthur Aparicio PA-C              Review of Systems   Constitutional: Negative for activity change, appetite change, chills, diaphoresis, fatigue, fever and unexpected weight change  HENT: Negative for congestion, drooling, ear discharge, ear pain, trouble swallowing and voice change  Eyes: Negative for photophobia, pain, discharge, redness, itching and visual disturbance  Respiratory: Negative for cough, chest tightness, shortness of breath and wheezing  Cardiovascular: Negative for chest pain, palpitations and leg swelling  Gastrointestinal: Negative for abdominal distention, abdominal pain, blood in stool, diarrhea, nausea and vomiting  Endocrine: Negative for cold intolerance, heat intolerance, polydipsia, polyphagia and polyuria  Genitourinary: Negative for dysuria, flank pain, frequency and hematuria  Musculoskeletal: Positive for arthralgias and joint swelling  Negative for back pain, gait problem, myalgias, neck pain and neck stiffness  Skin: Negative for color change, pallor, rash and wound  Neurological: Negative for dizziness, tremors, seizures, syncope, speech difficulty, weakness, numbness and headaches  Psychiatric/Behavioral: Negative for agitation  All other systems reviewed and are negative        Historical Information   Past Medical History:   Diagnosis Date    Asthma     Bipolar 1 disorder ([facility] )     COPD (chronic obstructive pulmonary disease) ([facility] )     Depression     Diabetes mellitus ([facility] )     Hypertension     Psychiatric disorder     cutting history    PTSD (post-traumatic stress disorder)     Tendonitis      Past Surgical History:   Procedure Laterality Date     SECTION      EAR SURGERY      IR LOWER EXTREMITY ANGIOGRAM  2021     Social History   Social History     Substance and Sexual Activity   Alcohol Use Not Currently     Social History     Substance and Sexual Activity   Drug Use Not Currently    Types: Cocaine, Marijuana    Comment: 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     Social History     Tobacco Use   Smoking Status Current Every Day Smoker    Packs/day: 2 00    Years: 17 00    Pack years: 34 00    Types: Cigarettes   Smokeless Tobacco Never Used   Tobacco Comment    pt is down to 4 cigarettes a day      Family History:   Family History   Problem Relation Age of Onset    Hypertension Mother     Diabetes Mother     HIV Mother     Heart disease Mother     No Known Problems Father        Meds/Allergies   Current Facility-Administered Medications   Medication Dose Route Frequency Provider Last Rate Last Admin    acetaminophen (TYLENOL) tablet 650 mg  650 mg Oral Q6H PRN Myesha Kilpatrick MD        aluminum-magnesium hydroxide-simethicone (MYLANTA) oral suspension 30 mL  30 mL Oral Q6H PRN Myesha Kilpatrick MD        atorvastatin (LIPITOR) tablet 40 mg  40 mg Oral Daily With Irven MD Toño   40 mg at 21 1723    chlorproMAZINE (THORAZINE) tablet 100 mg  100 mg Oral HS Myesha Kilpatrick MD   100 mg at 21 2223    clopidogrel (PLAVIX) tablet 75 mg  75 mg Oral Daily Yao Villarreal MD   75 mg at 21 0831    docusate sodium (COLACE) capsule 100 mg  100 mg Oral BID PRN Myesha Kilpatrick MD   100 mg at 21 0005    heparin (porcine) 25,000 units in 0 45% NaCl 250 mL infusion (premix)  3-30 Units/kg/hr (Order-Specific) Intravenous Titrated Myesha Kilpatrick MD 12 mL/hr at 21 0711 12 6 Units/kg/hr at 21 0711    heparin (porcine) injection 3,800 Units 3,800 Units Intravenous Q1H PRN Matt Niño MD   3,800 Units at 08/25/21 0710    heparin (porcine) injection 7,600 Units  7,600 Units Intravenous Q1H PRN Matt Niño MD        HYDROmorphone HCl (DILAUDID) injection 0 2 mg  0 2 mg Intravenous Q4H PRN Denilson Worrell PA-C   0 2 mg at 08/25/21 0557    hydrOXYzine HCL (ATARAX) tablet 50 mg  50 mg Oral TID PRN Denilson Worrell PA-C        insulin glargine (LANTUS) subcutaneous injection 30 Units 0 3 mL  30 Units Subcutaneous QAM Cesar Naranjo, DO   30 Units at 08/24/21 0807    insulin lispro (HumaLOG) 100 units/mL subcutaneous injection 1-5 Units  1-5 Units Subcutaneous HS Cesar Naranjo, DO   1 Units at 08/24/21 2223    insulin lispro (HumaLOG) 100 units/mL subcutaneous injection 2-12 Units  2-12 Units Subcutaneous TID AC Cesar Naranjo, DO   2 Units at 08/24/21 1248    insulin lispro (HumaLOG) 100 units/mL subcutaneous injection 5 Units  5 Units Subcutaneous TID With Meals Cesar Naranjo, DO   5 Units at 08/24/21 1723    lamoTRIgine (LaMICtal) tablet 25 mg  25 mg Oral Daily Matt Niño MD   25 mg at 08/25/21 0831    lurasidone (LATUDA) tablet 80 mg  80 mg Oral Daily With Breakfast Cesar Naranjo, DO   80 mg at 08/25/21 0711    nicotine (NICODERM CQ) 21 mg/24 hr TD 24 hr patch 1 patch  1 patch Transdermal Daily Matt Niño MD   1 patch at 08/25/21 0832    ondansetron (ZOFRAN) injection 4 mg  4 mg Intravenous Q6H PRN Matt Niño MD        oxyCODONE (ROXICODONE) IR tablet 5 mg  5 mg Oral Q4H PRN Denilson Worrell PA-C   5 mg at 08/25/21 0933    prazosin (MINIPRESS) capsule 1 mg  1 mg Oral HS Matt Niño MD   1 mg at 08/24/21 2223    QUEtiapine (SEROquel) tablet 300 mg  300 mg Oral HS Kaya Beltran PA-C        sodium chloride 0 9 % infusion  75 mL/hr Intravenous Continuous Denilson Worrell PA-C 75 mL/hr at 08/24/21 1600 75 mL/hr at 08/24/21 1600    traZODone (DESYREL) tablet 200 mg  200 mg Oral HS Dallas Ge MD   200 mg at 08/23/21 0005    vancomycin (VANCOCIN) 1500 mg in sodium chloride 0 9% 250 mL IVPB  15 mg/kg Intravenous Q8H Kaya Beltran PA-C 133 6 mL/hr at 08/25/21 0934 1,500 mg at 08/25/21 0934     No Known Allergies    Objective   Vitals: Blood pressure (!) 143/107, pulse 82, temperature 97 9 °F (36 6 °C), temperature source Oral, resp  rate 18, height 5' 1" (1 549 m), weight 104 kg (228 lb 2 8 oz), last menstrual period 08/22/2021, SpO2 96 %, not currently breastfeeding  Intake/Output Summary (Last 24 hours) at 8/25/2021 0942  Last data filed at 8/25/2021 0401  Gross per 24 hour   Intake 3313 42 ml   Output --   Net 3313 42 ml     Invasive Devices     Peripheral Intravenous Line            Peripheral IV 08/22/21 Right Antecubital 2 days    Peripheral IV 08/23/21 Left Antecubital 2 days                Physical Exam  Musculoskeletal:         General: Swelling and tenderness present  Comments: Left knee in clean dressing          The history was obtained from the review of the chart, patient  Lab Results:   Results from last 7 days   Lab Units 08/24/21  0554   HEMOGLOBIN A1C % 9 9*     Lab Results   Component Value Date    WBC 6 49 08/25/2021    HGB 12 5 08/25/2021    HCT 35 2 08/25/2021    MCV 81 (L) 08/25/2021     08/25/2021     Lab Results   Component Value Date/Time    BUN 15 08/25/2021 06:28 AM    K 3 7 08/25/2021 06:28 AM     08/25/2021 06:28 AM    CO2 28 08/25/2021 06:28 AM    CREATININE 0 43 (L) 08/25/2021 06:28 AM    AST 5 08/23/2021 06:16 AM    ALT 15 08/23/2021 06:16 AM    ALB 2 2 (L) 08/23/2021 06:16 AM     No results for input(s): CHOL, HDL, LDL, TRIG, VLDL in the last 72 hours    No results found for: Anand Crystal  POC Glucose (mg/dl)   Date Value   08/25/2021 138   08/24/2021 198 (H)   08/24/2021 91   08/24/2021 199 (H)   08/24/2021 242 (H)   08/23/2021 247 (H)   08/23/2021 222 (H)   08/23/2021 279 (H)   08/23/2021 303 (H) 08/23/2021 327 (H)       Imaging Studies: I have personally reviewed pertinent reports  Portions of the record may have been created with voice recognition software

## 2021-08-25 NOTE — ASSESSMENT & PLAN NOTE
· Mood currently appears stable  · However noted to have intermittent waxing and waning mentation here with increased somnolence and lethargy, per nursing report pt was much more alert today after medication adjustments as below   · Pt has not utilized a significant amount of narcotics here, continue decreased PRN IV dilaudid to 0 2 mg Q4H for severe pain with PRN oxycodone 5 mg for severe pain   · Transition pt from scheduled atarax to PRN for anxiety and switch Seroquel 300 mg to QHS rather than in the AM  · Likely combination of significant psych medications in addition to narcotics for pain control   · Continue Thorazine 100 mg QHS, Latuda 80 mg daily, Trazodone 200 mg daily  · Monitor

## 2021-08-25 NOTE — ASSESSMENT & PLAN NOTE
· Hx of nonhealing left 5th toe ulcer s/p LLE angiogram with L SFA stent placement on 8/11/21     · Arterial duplex performed here with evidence of SFA stent occlusion and popliteal stenosis   · Vascular surgery following,  · Plan for IR angiogram with possible intervention today   · Continue IVF hydration at 75 cc/hr  · Pulmonary consulted for clearance from respiratory standpoint for anesthesia   · Continue IV heparin gtt, plavix and statin per vascular

## 2021-08-26 LAB
ABO GROUP BLD: NORMAL
ANION GAP SERPL CALCULATED.3IONS-SCNC: 3 MMOL/L (ref 4–13)
APTT PPP: 162 SECONDS (ref 23–37)
APTT PPP: 42 SECONDS (ref 23–37)
APTT PPP: >210 SECONDS (ref 23–37)
BLD GP AB SCN SERPL QL: NEGATIVE
BUN SERPL-MCNC: 19 MG/DL (ref 5–25)
CALCIUM SERPL-MCNC: 7.9 MG/DL (ref 8.3–10.1)
CHLORIDE SERPL-SCNC: 108 MMOL/L (ref 100–108)
CO2 SERPL-SCNC: 27 MMOL/L (ref 21–32)
CREAT SERPL-MCNC: 1.1 MG/DL (ref 0.6–1.3)
ERYTHROCYTE [DISTWIDTH] IN BLOOD BY AUTOMATED COUNT: 13.2 % (ref 11.6–15.1)
GFR SERPL CREATININE-BSD FRML MDRD: 76 ML/MIN/1.73SQ M
GLUCOSE SERPL-MCNC: 140 MG/DL (ref 65–140)
GLUCOSE SERPL-MCNC: 167 MG/DL (ref 65–140)
GLUCOSE SERPL-MCNC: 221 MG/DL (ref 65–140)
GLUCOSE SERPL-MCNC: 271 MG/DL (ref 65–140)
GLUCOSE SERPL-MCNC: 381 MG/DL (ref 65–140)
GLUCOSE SERPL-MCNC: 94 MG/DL (ref 65–140)
HCT VFR BLD AUTO: 31.1 % (ref 34.8–46.1)
HGB BLD-MCNC: 11.1 G/DL (ref 11.5–15.4)
MCH RBC QN AUTO: 28.4 PG (ref 26.8–34.3)
MCHC RBC AUTO-ENTMCNC: 35.7 G/DL (ref 31.4–37.4)
MCV RBC AUTO: 80 FL (ref 82–98)
PLATELET # BLD AUTO: 275 THOUSANDS/UL (ref 149–390)
PMV BLD AUTO: 10.3 FL (ref 8.9–12.7)
POTASSIUM SERPL-SCNC: 3.8 MMOL/L (ref 3.5–5.3)
RBC # BLD AUTO: 3.91 MILLION/UL (ref 3.81–5.12)
RH BLD: POSITIVE
SODIUM SERPL-SCNC: 138 MMOL/L (ref 136–145)
SPECIMEN EXPIRATION DATE: NORMAL
VANCOMYCIN TROUGH SERPL-MCNC: 26.9 UG/ML (ref 10–20)
WBC # BLD AUTO: 8.38 THOUSAND/UL (ref 4.31–10.16)

## 2021-08-26 PROCEDURE — 02HV33Z INSERTION OF INFUSION DEVICE INTO SUPERIOR VENA CAVA, PERCUTANEOUS APPROACH: ICD-10-PCS | Performed by: INTERNAL MEDICINE

## 2021-08-26 PROCEDURE — 85730 THROMBOPLASTIN TIME PARTIAL: CPT | Performed by: PHYSICIAN ASSISTANT

## 2021-08-26 PROCEDURE — 99232 SBSQ HOSP IP/OBS MODERATE 35: CPT | Performed by: SURGERY

## 2021-08-26 PROCEDURE — 97163 PT EVAL HIGH COMPLEX 45 MIN: CPT

## 2021-08-26 PROCEDURE — 99232 SBSQ HOSP IP/OBS MODERATE 35: CPT | Performed by: INTERNAL MEDICINE

## 2021-08-26 PROCEDURE — NC001 PR NO CHARGE: Performed by: ORTHOPAEDIC SURGERY

## 2021-08-26 PROCEDURE — 86901 BLOOD TYPING SEROLOGIC RH(D): CPT | Performed by: PHYSICIAN ASSISTANT

## 2021-08-26 PROCEDURE — 80202 ASSAY OF VANCOMYCIN: CPT | Performed by: INTERNAL MEDICINE

## 2021-08-26 PROCEDURE — C1751 CATH, INF, PER/CENT/MIDLINE: HCPCS

## 2021-08-26 PROCEDURE — 85730 THROMBOPLASTIN TIME PARTIAL: CPT | Performed by: INTERNAL MEDICINE

## 2021-08-26 PROCEDURE — 82948 REAGENT STRIP/BLOOD GLUCOSE: CPT

## 2021-08-26 PROCEDURE — 36569 INSJ PICC 5 YR+ W/O IMAGING: CPT

## 2021-08-26 PROCEDURE — 80048 BASIC METABOLIC PNL TOTAL CA: CPT | Performed by: INTERNAL MEDICINE

## 2021-08-26 PROCEDURE — 94660 CPAP INITIATION&MGMT: CPT

## 2021-08-26 PROCEDURE — 85027 COMPLETE CBC AUTOMATED: CPT | Performed by: INTERNAL MEDICINE

## 2021-08-26 PROCEDURE — 86850 RBC ANTIBODY SCREEN: CPT | Performed by: PHYSICIAN ASSISTANT

## 2021-08-26 PROCEDURE — 86900 BLOOD TYPING SEROLOGIC ABO: CPT | Performed by: PHYSICIAN ASSISTANT

## 2021-08-26 PROCEDURE — 99223 1ST HOSP IP/OBS HIGH 75: CPT | Performed by: INTERNAL MEDICINE

## 2021-08-26 PROCEDURE — 94760 N-INVAS EAR/PLS OXIMETRY 1: CPT

## 2021-08-26 PROCEDURE — 99232 SBSQ HOSP IP/OBS MODERATE 35: CPT | Performed by: NURSE PRACTITIONER

## 2021-08-26 RX ORDER — INSULIN GLARGINE 100 [IU]/ML
45 INJECTION, SOLUTION SUBCUTANEOUS EVERY MORNING
Status: DISCONTINUED | OUTPATIENT
Start: 2021-08-27 | End: 2021-08-27 | Stop reason: HOSPADM

## 2021-08-26 RX ORDER — LISINOPRIL 20 MG/1
20 TABLET ORAL DAILY
Status: DISCONTINUED | OUTPATIENT
Start: 2021-08-28 | End: 2021-08-27 | Stop reason: HOSPADM

## 2021-08-26 RX ORDER — VANCOMYCIN HYDROCHLORIDE 1 G/200ML
15 INJECTION, SOLUTION INTRAVENOUS DAILY PRN
Status: DISCONTINUED | OUTPATIENT
Start: 2021-08-27 | End: 2021-08-27

## 2021-08-26 RX ADMIN — OXYCODONE HYDROCHLORIDE 5 MG: 5 TABLET ORAL at 08:29

## 2021-08-26 RX ADMIN — HEPARIN SODIUM 7600 UNITS: 1000 INJECTION, SOLUTION INTRAVENOUS; SUBCUTANEOUS at 13:47

## 2021-08-26 RX ADMIN — OXYCODONE HYDROCHLORIDE 5 MG: 5 TABLET ORAL at 20:10

## 2021-08-26 RX ADMIN — HEPARIN SODIUM 10.6 UNITS/KG/HR: 10000 INJECTION, SOLUTION INTRAVENOUS at 22:16

## 2021-08-26 RX ADMIN — VANCOMYCIN HYDROCHLORIDE 1500 MG: 5 INJECTION, POWDER, LYOPHILIZED, FOR SOLUTION INTRAVENOUS at 10:18

## 2021-08-26 RX ADMIN — OXYCODONE HYDROCHLORIDE 5 MG: 5 TABLET ORAL at 12:22

## 2021-08-26 RX ADMIN — PHENYTOIN 1 MG: 125 SUSPENSION ORAL at 21:04

## 2021-08-26 RX ADMIN — TRAZODONE HYDROCHLORIDE 200 MG: 100 TABLET ORAL at 21:05

## 2021-08-26 RX ADMIN — SODIUM CHLORIDE 75 ML/HR: 0.9 INJECTION, SOLUTION INTRAVENOUS at 22:17

## 2021-08-26 RX ADMIN — ATORVASTATIN CALCIUM 40 MG: 40 TABLET, FILM COATED ORAL at 17:13

## 2021-08-26 RX ADMIN — LURASIDONE HYDROCHLORIDE 80 MG: 80 TABLET, FILM COATED ORAL at 08:14

## 2021-08-26 RX ADMIN — CLOPIDOGREL BISULFATE 75 MG: 75 TABLET ORAL at 08:11

## 2021-08-26 RX ADMIN — INSULIN LISPRO 4 UNITS: 100 INJECTION, SOLUTION INTRAVENOUS; SUBCUTANEOUS at 08:11

## 2021-08-26 RX ADMIN — NICOTINE 1 PATCH: 21 PATCH, EXTENDED RELEASE TRANSDERMAL at 08:14

## 2021-08-26 RX ADMIN — CHLORPROMAZINE HYDROCHLORIDE 100 MG: 25 TABLET, FILM COATED ORAL at 21:04

## 2021-08-26 RX ADMIN — LISINOPRIL 20 MG: 20 TABLET ORAL at 08:11

## 2021-08-26 RX ADMIN — INSULIN LISPRO 5 UNITS: 100 INJECTION, SOLUTION INTRAVENOUS; SUBCUTANEOUS at 12:17

## 2021-08-26 RX ADMIN — INSULIN LISPRO 1 UNITS: 100 INJECTION, SOLUTION INTRAVENOUS; SUBCUTANEOUS at 22:06

## 2021-08-26 RX ADMIN — INSULIN GLARGINE 30 UNITS: 100 INJECTION, SOLUTION SUBCUTANEOUS at 08:12

## 2021-08-26 RX ADMIN — VANCOMYCIN HYDROCHLORIDE 1500 MG: 5 INJECTION, POWDER, LYOPHILIZED, FOR SOLUTION INTRAVENOUS at 02:46

## 2021-08-26 RX ADMIN — SODIUM CHLORIDE 75 ML/HR: 0.9 INJECTION, SOLUTION INTRAVENOUS at 10:16

## 2021-08-26 RX ADMIN — INSULIN LISPRO 10 UNITS: 100 INJECTION, SOLUTION INTRAVENOUS; SUBCUTANEOUS at 12:16

## 2021-08-26 RX ADMIN — HYDROMORPHONE HYDROCHLORIDE 0.2 MG: 0.2 INJECTION, SOLUTION INTRAMUSCULAR; INTRAVENOUS; SUBCUTANEOUS at 21:07

## 2021-08-26 RX ADMIN — VANCOMYCIN HYDROCHLORIDE 1500 MG: 5 INJECTION, POWDER, LYOPHILIZED, FOR SOLUTION INTRAVENOUS at 17:13

## 2021-08-26 RX ADMIN — INSULIN LISPRO 5 UNITS: 100 INJECTION, SOLUTION INTRAVENOUS; SUBCUTANEOUS at 08:13

## 2021-08-26 NOTE — PHYSICAL THERAPY NOTE
Physical Therapy Initial Evaluation       08/26/21 1040   PT Last Visit   PT Visit Date 08/26/21   Note Type   Note type Evaluation   Pain Assessment   Pain Assessment Tool 0-10   Pain Score Worst Possible Pain   Pain Location/Orientation Orientation: Left; Location: Knee   Hospital Pain Intervention(s) Repositioned; Ambulation/increased activity; Other (Comment)  (RN aware)   Home Living   Type of Home Other (Comment)  (rents a room)   Home Layout Elevator  (5th floor room, elevator access, no DOROTHY, shared bathroom)   Bathroom Shower/Tub Walk-in shower   [address]   (none)   Prior Function   Level of [place] Independent with ADLs and functional mobility   Lives With [email or web address] in the last 6 months   (unknown per pt )   Comments pt reports she rents a room and lives alone, but her wife stays with her every night and typically goes home during the day   Restrictions/Precautions   Weight Bearing Precautions Per Order Yes   LLE Weight Bearing Per Order WBAT   Braces or Orthoses   (n/a)   Other Precautions Cognitive; Impulsive; Fall Risk;Pain;Multiple lines   General   Family/Caregiver Present No   Cognition   Overall Cognitive Status Impaired   Arousal/Participation Cooperative   Orientation Level Oriented X4   Memory Decreased recall of precautions   Following Commands Follows one step commands with increased time or repetition   Comments pt impulsive requiring cues for safety    RUE Assessment   RUE Assessment WFL   LUE Assessment   LUE Assessment WFL   RLE Assessment   RLE Assessment WFL   LLE Assessment   LLE Assessment X   LLE Overall AROM   L Knee Flexion AROM to ~90 degrees with increased pain   Strength LLE   LLE Overall Strength   (hip flexion 2+/5, knee ext 3+/5, DF 4/5)   Coordination   Movements are Fluid and Coordinated 1   Sensation WFL   Light Touch   RLE Light Touch Grossly intact   LLE Light Touch Grossly intact   Bed Mobility   Supine to Sit 5  Supervision   Additional items Verbal cues; Impulsive   Transfers   Sit to Stand 5  Supervision   Additional items Impulsive;Verbal cues   Stand to Sit 5  Supervision   Additional items Impulsive;Verbal cues   Ambulation/Elevation   Gait pattern Antalgic;Decreased L stance; Inconsistent juanita   Gait Assistance 5  Supervision   Additional items Verbal cues  (VC for safety and appropriate use of RW to offload L LE )   Assistive Device Rolling walker   Distance 12 ft x 2    Balance   Static Sitting Good   Dynamic Sitting Good   Static Standing Fair +   Dynamic Standing Fair   Ambulatory Fair   Endurance Deficit   Endurance Deficit Yes   Endurance Deficit Description reports fatigue with ambulation   Activity Tolerance   Activity Tolerance Patient tolerated treatment well   Nurse Made Aware yes    Assessment   Prognosis Good   Problem List Decreased strength;Decreased range of motion;Decreased endurance; Impaired balance;Decreased mobility; Decreased safety awareness;Pain   Assessment Pt is a 30year old female admitted with L knee pain, found to have occlusion of L SFA stent s/p recent stenting and now s/p L LE arteriogram with recanalization of SFA on 8/25  Also diagnosed with L Knee prepatellar bursitis s/p bedside aspiration on 8/25 with fluid growing gram+ cocci, pending ID consult, WBAT per ortho  She has PMHX including but not limited to bipolar disorder, TIA, COPD, DM  She reports she lives alone and is independent at baseline, rents a 5th floor room with elevator access, wife stays with her every night but goes to her own home during the day  Pt currently cooperative, but very impulsive, presents with increased L knee pain, decreased L knee ROM, decreased strength, decreased activity tolerance and decreased balance    She is at a supervision level with bed mobility, transfers and ambulation for ~12 ft with RW, cuing required throughout session for safety due to impulsivity  She will benefit from continued skilled PT services while in the hospital to address the above impairments and maximize safety and independence with mobility  Anticipate with continued progress she will be safe for d/c home with home PT and use of RW  She will likely need a RW for home as she reports she has no DME  Barriers to Discharge Decreased caregiver support   Goals   Patient Goals be able to walk   STG Expiration Date 09/09/21   Short Term Goal #1 1  Pt will perform all bed mobility activities independently 2  Pt will perform all functional transfers mod I using LRAD 3  Pt will ambulate 150 ft mod I using LRAD 4  Pt will be independent w/ HEP for L LE strengthening and ROM 5  Pt will improve overall standing balance to good to decrease fall risk     PT Treatment Day 0   Plan   Treatment/Interventions Functional transfer training;LE strengthening/ROM; Therapeutic exercise; Endurance training;Patient/family training;Gait training;Bed mobility;Spoke to nursing   PT Frequency Other (Comment)  (3-5x/wk)   Recommendation   PT Discharge Recommendation Home with home health rehabilitation   Equipment Recommended [facility] Recommended Wheeled walker   PT - OK to Discharge No   AM-PAC Basic Mobility Inpatient   Turning in Bed Without Bedrails 3   Lying on Back to Sitting on Edge of Flat Bed 3   Moving Bed to Chair 3   Standing Up From Chair 3   Walk in Room 3   Climb 3-5 Stairs 2   Basic Mobility Inpatient Raw Score 17   Basic Mobility Standardized Score 39 67     Graciela Hutson, PT

## 2021-08-26 NOTE — PROGRESS NOTES
Progress Note - Orthopedics   Juancarlos Guerra 30 y o  female MRN: [id number]  Unit/Bed#: TANIA -01      Subjective:    30 y  o female left knee prepatellar bursitis  Pain much improved  No acute events, no complaints   Denies fevers chills, CP, SOB    Labs:  0   Lab Value Date/Time    HCT 35 2 08/25/2021 0628    HCT 35 8 08/24/2021 0554    HCT 34 9 08/23/2021 0616    HGB 12 5 08/25/2021 0628    HGB 12 7 08/24/2021 0554    HGB 12 6 08/23/2021 0616    INR 1 01 08/23/2021 0616    WBC 6 49 08/25/2021 0628    WBC 6 71 08/24/2021 0554    WBC 8 70 08/23/2021 0616       Meds:    Current Facility-Administered Medications:     acetaminophen (TYLENOL) tablet 650 mg, 650 mg, Oral, Q6H PRN, Geo Suarez MD, 650 mg at 08/25/21 2221    aluminum-magnesium hydroxide-simethicone (MYLANTA) oral suspension 30 mL, 30 mL, Oral, Q6H PRN, Geo Suarez MD    atorvastatin (LIPITOR) tablet 40 mg, 40 mg, Oral, Daily With Cristel Christensen MD, 40 mg at 08/24/21 1723    chlorproMAZINE (THORAZINE) tablet 100 mg, 100 mg, Oral, HS, Geo Suarez MD, 100 mg at 08/25/21 2213    clopidogrel (PLAVIX) tablet 75 mg, 75 mg, Oral, Daily, Magalie Osullivan MD, 75 mg at 08/25/21 0831    docusate sodium (COLACE) capsule 100 mg, 100 mg, Oral, BID PRN, Geo Suarez MD, 100 mg at 08/23/21 0005    heparin (porcine) 25,000 units in 0 45% NaCl 250 mL infusion (premix), 3-30 Units/kg/hr (Order-Specific), Intravenous, Titrated, Geo Suarez MD, Last Rate: 9 1 mL/hr at 08/26/21 0115, 9 6 Units/kg/hr at 08/26/21 0115    heparin (porcine) injection 3,800 Units, 3,800 Units, Intravenous, Q1H PRN, Geo Suarez MD, 3,800 Units at 08/25/21 0710    heparin (porcine) injection 7,600 Units, 7,600 Units, Intravenous, Q1H PRN, Geo Suarez MD    HYDROmorphone HCl (DILAUDID) injection 0 2 mg, 0 2 mg, Intravenous, Q4H PRN, Lisha Beltran PA-C, 0 2 mg at 08/25/21 1026    hydrOXYzine HCL (ATARAX) tablet 50 mg, 50 mg, Oral, TID PRN, Lisha Braxton PREETI Beltran PA-C    insulin glargine (LANTUS) subcutaneous injection 30 Units 0 3 mL, 30 Units, Subcutaneous, QAM, Teresa Rampart, DO, 30 Units at 08/24/21 0807    insulin lispro (HumaLOG) 100 units/mL subcutaneous injection 1-5 Units, 1-5 Units, Subcutaneous, HS, Teresa Rampart, DO, 1 Units at 08/24/21 2223    insulin lispro (HumaLOG) 100 units/mL subcutaneous injection 2-12 Units, 2-12 Units, Subcutaneous, TID AC, 2 Units at 08/24/21 1248 **AND** Fingerstick Glucose (POCT), , , TID AC, Aaliyah Agudelo DO    insulin lispro (HumaLOG) 100 units/mL subcutaneous injection 5 Units, 5 Units, Subcutaneous, TID With Meals, Teresa Rampart, DO, 5 Units at 08/24/21 1723    lamoTRIgine (LaMICtal) tablet 25 mg, 25 mg, Oral, Daily, Celso Wesley MD, 25 mg at 08/25/21 0831    lisinopril (ZESTRIL) tablet 20 mg, 20 mg, Oral, Daily, Kaya Beltran PA-C    lurasidone (LATUDA) tablet 80 mg, 80 mg, Oral, Daily With Breakfast, Teresa [place], DO, 80 mg at 08/25/21 0711    nicotine (NICODERM CQ) 21 mg/24 hr TD 24 hr patch 1 patch, 1 patch, Transdermal, Daily, Celso Wesley MD, 1 patch at 08/25/21 0832    ondansetron (ZOFRAN) injection 4 mg, 4 mg, Intravenous, Q6H PRN, Celso Wesley MD    oxyCODONE (ROXICODONE) IR tablet 5 mg, 5 mg, Oral, Q4H PRN, Zack Beltran PA-C, 5 mg at 08/25/21 0933    prazosin (MINIPRESS) capsule 1 mg, 1 mg, Oral, HS, Celso Wesley MD, 1 mg at 08/25/21 2213    QUEtiapine (SEROquel) tablet 300 mg, 300 mg, Oral, HS, Kaya Beltran PA-C, 300 mg at 08/25/21 2212    sodium chloride 0 9 % infusion, 75 mL/hr, Intravenous, Continuous, Kaya Beltran PA-C, Last Rate: 75 mL/hr at 08/25/21 2000, 75 mL/hr at 08/25/21 2000    traZODone (DESYREL) tablet 200 mg, 200 mg, Oral, HS, Celso Wesley MD, 200 mg at 08/25/21 2213    vancomycin (VANCOCIN) 1500 mg in sodium chloride 0 9% 250 mL IVPB, 15 mg/kg, Intravenous, Q8H, Kaya Beltran PA-C, Last Rate: 166 7 mL/hr at 08/26/21 0246, 1,500 mg at 08/26/21 0246    Blood Culture:   Lab Results   Component Value Date    BLOODCX No Growth at 48 hrs  08/22/2021    BLOODCX No Growth at 48 hrs  08/22/2021       Wound Culture:   No results found for: WOUNDCULT    Ins and Outs:  I/O last 24 hours: In: 3063 4 [P O :580; I V :2483 4]  Out: 900 [Urine:600; Blood:300]          Physical:  Vitals:    08/26/21 0156   BP:    Pulse:    Resp:    Temp:    SpO2: 98%     Musculoskeletal: left Lower Extremity  · Minimal erythema over anterior knee over patella   · Decreased size of palpable fluid collection with mild fluctuance   · Mild TTP over patella  · No effusion   · SILT  · SILT s/s/sp/dp/t    · +knee flex/extension, +fhl/ehl, +ankle dorsi/plantar flexion  · 2+ DP pulse    Assessment:    35 y  o female with left prepatellar bursitis s/p bedside aspiration  Exam much improved this AM with significant reduction in tenderness and resolving fluid collection  Will continue to monitor        Plan:  · WBAT LLE in knee compressive wrap   · Aspiration cultures NGTD, final result pending   · IV abx per primary  · Pain control   · DVT ppx per primary   · Monitor clinical exam   · Dispo: Ortho will follow    Aleen Scheuermann, MD

## 2021-08-26 NOTE — PROGRESS NOTES
Progress Note - Bailee Suarez 30 y o  female MRN: [id number]    Unit/Bed#: -01 Encounter: [id number]      CC: diabetes f/u    Subjective:   Bailee Suarez is a 30y o  year old female with type 2 diabetes on long-term use of insulin,  is admitted for non healing left 5th toe ulcer s/p left SFA stent who presented for knee pain and redness and is admitted for for stent occlusion s/p Successful recanalization of occluded L-SFA stent w/thrombectomy  Patient was seen and examined at bedside  Complains of knee pain, reports good appetite      Objective:     Vitals: Blood pressure 131/77, pulse 99, temperature 99 1 °F (37 3 °C), resp  rate 19, height 5' 1" (1 549 m), weight 104 kg (229 lb 8 oz), last menstrual period 08/22/2021, SpO2 97 %, not currently breastfeeding  ,Body mass index is 43 36 kg/m²  Intake/Output Summary (Last 24 hours) at 8/26/2021 1611  Last data filed at 8/26/2021 1016  Gross per 24 hour   Intake 4625 18 ml   Output 2100 ml   Net 2525 18 ml       Physical Exam:  General Appearance: awake, appears stated age and cooperative  Head: Normocephalic, without obvious abnormality, atraumatic  Extremities: moves all extremities  Skin: Skin color and temperature normal    Pulm: no labored breathing    Lab, Imaging and other studies: I have personally reviewed pertinent reports        POC Glucose (mg/dl)   Date Value   08/26/2021 381 (H)   08/26/2021 221 (H)   08/25/2021 134   08/25/2021 114   08/25/2021 94   08/25/2021 110   08/25/2021 138   08/24/2021 198 (H)   08/24/2021 91   08/24/2021 199 (H)       Assessment and plan:    Peripheral artery disease (HCC)  Assessment & Plan  Recent  left SFA stent and now with stent occlusion  Management per primary team      Type 2 diabetes mellitus with diabetic polyneuropathy, with long-term current use of insulin [facility])  Assessment & Plan  Lab Results   Component Value Date    HGBA1C 9 9 (H) 08/24/2021       Recent Labs     08/24/21  1559 08/24/21 2057 08/25/21  0627 08/25/21  1053   POCGLU 91 198* 138 110     Uncontrolled with the most recent A1c of 9 9%  The goal is less than 7%  Home regimen consists of Lantus 30 units q h s , Humalog with correctional insulin, Trulicity 1 5 mg once weekly, metformin 1 g b i d  Currently on Lantus 30 units q h s  And Humalog 5 units t i d  A c, however patient's blood sugar are above target  We increased Lantus to 45 units q h s  And Humalog 15 units t i d  A c  Continue correctional insulin  Continue to monitor blood sugar over time and make adjustments to the regimen if necessary              Portions of the record may have been created with voice recognition software

## 2021-08-26 NOTE — PROGRESS NOTES
Vancomycin Assessment    Michael Brandon is a 30 y o  female who is currently receiving vancomycin 1500mg IV Q8 for a knee infection   Relevant clinical data and objective history reviewed:  Creatinine   Date Value Ref Range Status   08/26/2021 1 10 0 60 - 1 30 mg/dL Final     Comment:     Standardized to IDMS reference method   08/25/2021 0 43 (L) 0 60 - 1 30 mg/dL Final     Comment:     Standardized to IDMS reference method   08/24/2021 0 54 (L) 0 60 - 1 30 mg/dL Final     Comment:     Standardized to IDMS reference method     /77   Pulse 99   Temp 99 1 °F (37 3 °C)   Resp 19   Ht 5' 1" (1 549 m)   Wt 104 kg (229 lb 8 oz)   LMP 08/22/2021   SpO2 97%   BMI 43 36 kg/m²   I/O last 3 completed shifts: In: 4137 3 [P O :1480; I V :2657 3]  Out: 1500 [Urine:1200; Blood:300]  Lab Results   Component Value Date/Time    BUN 19 08/26/2021 12:14 PM    WBC 8 38 08/26/2021 12:14 PM    HGB 11 1 (L) 08/26/2021 12:14 PM    HCT 31 1 (L) 08/26/2021 12:14 PM    MCV 80 (L) 08/26/2021 12:14 PM     08/26/2021 12:14 PM     Temp Readings from Last 3 Encounters:   08/26/21 99 1 °F (37 3 °C)   08/22/21 98 5 °F (36 9 °C) (Oral)   08/11/21 98 3 °F (36 8 °C) (Oral)     Vancomycin Days of Therapy: 3    Assessment/Plan  The patient is currently on vancomycin utilizing scheduled dosing  The patient is receiving 1500mg IV Q8 with the most recent vancomycin level being at steady-state and supratherapeutic based on a goal of 15-20 (appropriate for most indications); therefore, after clinical evaluation will be changed to 1000mg IV PRN random level <20   Pharmacy will continue to follow closely for s/sx of nephrotoxicity, infusion reactions, and appropriateness of therapy  BMP and CBC will be ordered per protocol  Plan for random level with morning labs tomorrow at approximately 0600  Pharmacy will continue to follow the patients culture results and clinical progress daily      Víctor Youssef

## 2021-08-26 NOTE — PLAN OF CARE
Problem: PHYSICAL THERAPY ADULT  Goal: Performs mobility at highest level of function for planned discharge setting  See evaluation for individualized goals  Description: Treatment/Interventions: Functional transfer training, LE strengthening/ROM, Therapeutic exercise, Endurance training, Patient/family training, Gait training, Bed mobility, Spoke to nursing  Equipment Recommended: Jacquie       See flowsheet documentation for full assessment, interventions and recommendations  Note: Prognosis: Good  Problem List: Decreased strength, Decreased range of motion, Decreased endurance, Impaired balance, Decreased mobility, Decreased safety awareness, Pain  Assessment: Pt is a 30year old female admitted with L knee pain, found to have occlusion of L SFA stent s/p recent stenting and now s/p L LE arteriogram with recanalization of SFA on 8/25  Also diagnosed with L Knee prepatellar bursitis s/p bedside aspiration on 8/25 with fluid growing gram+ cocci, pending ID consult, WBAT per ortho  She has PMHX including but not limited to bipolar disorder, TIA, COPD, DM  She reports she lives alone and is independent at baseline, rents a 5th floor room with elevator access, wife stays with her every night but goes to her own home during the day  Pt currently cooperative, but very impulsive, presents with increased L knee pain, decreased L knee ROM, decreased strength, decreased activity tolerance and decreased balance  She is at a supervision level with bed mobility, transfers and ambulation for ~12 ft with RW, cuing required throughout session for safety due to impulsivity  She will benefit from continued skilled PT services while in the hospital to address the above impairments and maximize safety and independence with mobility  Anticipate with continued progress she will be safe for d/c home with home PT and use of RW  She will likely need a RW for home as she reports she has no DME      Barriers to Discharge: Decreased caregiver support        PT Discharge Recommendation: Home with home health rehabilitation     PT - OK to Discharge: No    See flowsheet documentation for full assessment  Problem: PHYSICAL THERAPY ADULT  Goal: Performs mobility at highest level of function for planned discharge setting  See evaluation for individualized goals  Description: Treatment/Interventions: Functional transfer training, LE strengthening/ROM, Therapeutic exercise, Endurance training, Patient/family training, Gait training, Bed mobility, Spoke to nursing  Equipment Recommended: Adam Patterson       See flowsheet documentation for full assessment, interventions and recommendations  Note: Prognosis: Good  Problem List: Decreased strength, Decreased range of motion, Decreased endurance, Impaired balance, Decreased mobility, Decreased safety awareness, Pain  Assessment: Pt is a 30year old female admitted with L knee pain, found to have occlusion of L SFA stent s/p recent stenting and now s/p L LE arteriogram with recanalization of SFA on 8/25  Also diagnosed with L Knee prepatellar bursitis s/p bedside aspiration on 8/25 with fluid growing gram+ cocci, pending ID consult  She has PMHX including but not limited to bipolar disorder, TIA, COPD, DM  She reports she lives alone and is independent at baseline, rents a 5th floor room with elevator access, wife stays with her every night but goes to her own home during the day  Pt currently cooperative, but very impulsive, presents with increased L knee pain, decreased L knee ROM, decreased strength, decreased activity tolerance and decreased balance  She is at a supervision level with bed mobility, transfers and ambulation for ~12 ft with RW, cuing required throughout session for safety due to impulsivity  She will benefit from continued skilled PT services while in the hospital to address the above impairments and maximize safety and independence with mobility    Anticipate with continued progress she will be safe for d/c home with home PT and use of RW  She will likely need a RW for home as she reports she has no DME  Barriers to Discharge: Decreased caregiver support        PT Discharge Recommendation: Home with home health rehabilitation     PT - OK to Discharge: No    See flowsheet documentation for full assessment       Libia Reyes, PT

## 2021-08-26 NOTE — ASSESSMENT & PLAN NOTE
· Hx of nonhealing left 5th toe ulcer s/p LLE angiogram with L SFA stent placement on 8/11/21     · Arterial duplex performed here with evidence of SFA stent occlusion and popliteal stenosis   · Vascular surgery following,  S/P IR angiogram : - "Successful recanalization of occluded L-SFA stent w/thrombectomy  - Chronic appearing stenoses at prox/distal ends of stent, angioplastied using 6mm balloon  - Residual clot and stenoses at distal end of stent  - Stent extended distally into prox pop artery using 6mm x 120mm Viola stent" per IR and vascular   · Continue IVF hydration at 75 cc/hr  · Pulmonary consulted for clearance from respiratory standpoint for anesthesia   · Continue IV heparin gtt, plavix and statin per vascular

## 2021-08-26 NOTE — PLAN OF CARE
Problem: Potential for Falls  Goal: Patient will remain free of falls  Description: INTERVENTIONS:  - Educate patient/family on patient safety including physical limitations  - Instruct patient to call for assistance with activity   - Consult OT/PT to assist with strengthening/mobility   - Keep Call bell within reach  - Keep bed low and locked with side rails adjusted as appropriate  - Keep care items and personal belongings within reach  - Initiate and maintain comfort rounds  - Make Fall Risk Sign visible to staff  -  - Apply yellow socks and bracelet for high fall risk patients  - Consider moving patient to room near nurses station  Outcome: Progressing

## 2021-08-26 NOTE — PROGRESS NOTES
Vascular Surgery Post-Op Check  Rebecca Santana 30 y o  female MRN: [id number]  Unit/Bed#: -01 Encounter: [id number]     S:   Patient seen and examined bedside  Endorses hunger post procedure  Denies pain to groin, endorses continued pain to L knee unchanged from prior  No chest pain, SOB  No nausea, fevers, or chills  O:   Vitals:    08/25/21 2030   BP:    Pulse: 86   Resp: 17   Temp:    SpO2: 98%     I/O       08/24 0701 - 08/25 0700 08/25 0701 - 08/26 0700    P  O  1060 0    I V  (mL/kg) 2514 6 (24 4) 1000 (9 7)    IV Piggyback 121 7     Total Intake(mL/kg) 3696 3 (35 9) 1000 (9 7)    Urine (mL/kg/hr)  0 (0)    Blood  300    Total Output  300    Net +3696 3 +700          Unmeasured Urine Occurrence  1 x        PE:  Gen:  NAD  CV:  Regular rate  Lung:  normal effort  Abd:  soft, nondistended  Ext:  R Dopplerable DP/PT signals, R groin dressing c/d/i, no expanding hematoma, L Dopplerable AT/PT signals (weak but present AT signal), L knee covered with ACE bandage  Neuro:  M/S grossly intact throughout b/l lower extremities      Lab Results   Component Value Date    WBC 6 49 08/25/2021    HGB 12 5 08/25/2021    HCT 35 2 08/25/2021    MCV 81 (L) 08/25/2021     08/25/2021     Lab Results   Component Value Date    CALCIUM 8 1 (L) 08/25/2021    K 3 7 08/25/2021    CO2 28 08/25/2021     08/25/2021    BUN 15 08/25/2021    CREATININE 0 43 (L) 08/25/2021         A/P: 30 y o  female  w/PMHx DM, COPD, BPD, PAD w/L 5th toe wound s/p angiogram w/L SFA PTA/stent 8/11/21, p/w L knee pain and found to have SFA stent occlusion now s/p lower extremity angiogram with IR     Per IR procedure note:  - Successful recanalization of occluded L-SFA stent w/thrombectomy  - Chronic appearing stenoses at prox/distal ends of stent, angioplastied using 6mm balloon  - Residual clot and stenoses at distal end of stent  - Stent extended distally into prox pop artery using 6mm x 120mm Viola stent  - Dominant flow to left foot through the PT, peroneal appeared diminutive, AT was chronically occluded      On exam - Left: weakly dopplerable AT signal, dopplerable PT signal, no DP signal appreciated    Plan:   Continue statin, plavix, hep gtt   Per IR: bedrest x 2hrs; Monitor for improvement in left lower extremity ischemic symptoms   Neurovascular checks   Medical management per primary team        Basilio Hu MD  PGY1, General Surgery

## 2021-08-26 NOTE — ASSESSMENT & PLAN NOTE
· Appreciate input per pulmonary, s/p perioperative assessment  · No PFT on file, unclear copd hx  · Regardless no signs of exacerbation, no indication for steroids at this time  · Ordered cpap qhs  · Pulm toilet -   · PFT as outpatient

## 2021-08-26 NOTE — ASSESSMENT & PLAN NOTE
· Initially presenting w/ htn urgency, now significantly improved   · Continue prazosin 1 mg QHS  · Resume lisinopril 20 mg daily   · Monitor

## 2021-08-26 NOTE — PROGRESS NOTES
[facility]  Progress Note - Carlos Olvera 1985, 30 y o  female MRN: [id number]  Unit/Bed#: -Y[place]ick Encounter: [id number]  Primary Care Provider: Alex Dillon DO   Date and time admitted to hospital: 8/22/2021  8:58 PM    * Acute pain of left knee  Assessment & Plan  · Pt presented with worsening pain of the left knee, redness and drainage   · S/p CT LE revealing:  Skin thickening overlying the prepatellar soft tissues which demonstrates underlying focal subcutaneous stranding which may be due to cellulitis or early prepatellar bursitis  No evidence of discrete fluid collection to suggest drainable abscess  No evidence of knee joint effusion    · Likely in setting of underlying cellulitis, possible joint involvement    · Currently on IV Vancomycin Q8H, pharmacy consultation for dosing   · Ortho following,  · Pt is s/p left knee prepatellar bursa aspiration - now growing gram positive cocci in clusters  · - discussed with id will see for plan moving forward   · Follow up culture results and titrate abx based on this  · MRSA swab nares positive  · Due to difficulty stick inability to obtain labs will place picc line - blood cultures remain negative 72 hr   · Blood cultures negative x 72 hours, pt not meeting SIRS or sepsis criteria   · Arterial duplex with evidence of SFA stent occlusion and popliteal stenosis   · PRN pain management  · Vascular following, pt for IR angiogram with possible intervention today    Bipolar disorder ([facility] [address] )  Assessment & Plan  · Mood currently appears stable  · However noted to have intermittent waxing and waning mentation here with increased somnolence and lethargy, per nursing report pt was much more alert today after medication adjustments as below   · Pt has not utilized a significant amount of narcotics here, continue decreased PRN IV dilaudid to 0 2 mg Q4H for severe pain with PRN oxycodone 5 mg for severe pain   · Transition pt from scheduled atarax to PRN for anxiety and switch Seroquel 300 mg to QHS rather than in the AM  · Likely combination of significant psych medications in addition to narcotics for pain control   · Continue Thorazine 100 mg QHS, Latuda 80 mg daily, Trazodone 200 mg daily  · Monitor    Type 2 diabetes mellitus with diabetic polyneuropathy, with long-term current use of insulin ([facility])  Assessment & Plan    Lab Results   Component Value Date    HGBA1C 9 9 (H) 08/24/2021   · Appears uncontrolled as an outpatient as evidenced by hgbA1c  · Endocrinology following,  · Continue Lantus 30 units with scheduled humalog 5 units TID with meals   · Continue SSI coverage   · QID glucose checks  · Consistent carb diet   · Monitor and adjust regimen as needed    Peripheral artery disease ([facility] [address] )  Assessment & Plan  · Hx of nonhealing left 5th toe ulcer s/p LLE angiogram with L SFA stent placement on 8/11/21  · Arterial duplex performed here with evidence of SFA stent occlusion and popliteal stenosis   · Vascular surgery following,  S/P IR angiogram : - "Successful recanalization of occluded L-SFA stent w/thrombectomy  - Chronic appearing stenoses at prox/distal ends of stent, angioplastied using 6mm balloon  - Residual clot and stenoses at distal end of stent  - Stent extended distally into prox pop artery using 6mm x 120mm Viola stent" per IR and vascular   · Continue IVF hydration at 75 cc/hr  · Pulmonary consulted for clearance from respiratory standpoint for anesthesia   · Continue IV heparin gtt, plavix and statin per vascular     Nicotine dependence  Assessment & Plan  · Continue nicotine replacement    · Encourage cessation    COPD (chronic obstructive pulmonary disease) ([facility])  Assessment & Plan  · Appreciate input per pulmonary, s/p perioperative assessment  · No PFT on file, unclear copd hx  · Regardless no signs of exacerbation, no indication for steroids at this time  · Ordered cpap qhs  · Pulm toilet -   · PFT as outpatient     Hypertension  Assessment & Plan  · Initially presenting w/ htn urgency, now significantly improved   · Continue prazosin 1 mg QHS  · Resume lisinopril 20 mg daily   · Monitor         VTE Pharmacologic Prophylaxis: VTE Score: 3 High Risk (Score >/= 5) - Pharmacological DVT Prophylaxis Ordered: heparin drip  Sequential Compression Devices Ordered  Patient Centered Rounds: I performed bedside rounds with nursing staff today  Discussions with Specialists or Other Care Team Provider: nursing     Education and Discussions with Family / Patient: pt will update her significant other (wife)   Time Spent for Care: 45 minutes  More than 50% of total time spent on counseling and coordination of care as described above  Current Length of Stay: 3 day(s)  Current Patient Status: Inpatient   Certification Statement: The patient will continue to require additional inpatient hospital stay due to ongong need for iv abx at this time   Discharge Plan: unclear at this time pending culture growth and determined need for treatment     Code Status: Level 1 - Full Code    Subjective:   Pt is very anxious very distracted when talking to her  We discussed placement of picc line she was agreeable and signed consent risks discussed  Pt has pain in the left knee     Objective:     Vitals:   Temp (24hrs), Av 1 °F (36 7 °C), Min:97 4 °F (36 3 °C), Max:98 8 °F (37 1 °C)    Temp:  [97 4 °F (36 3 °C)-98 8 °F (37 1 °C)] 98 8 °F (37 1 °C)  HR:  [82-95] 95  Resp:  [8-23] 16  BP: (109-160)/() 156/88  SpO2:  [98 %-99 %] 98 %  Body mass index is 43 36 kg/m²  Input and Output Summary (last 24 hours): Intake/Output Summary (Last 24 hours) at 2021 1054  Last data filed at 2021 1016  Gross per 24 hour   Intake 4342 66 ml   Output 2100 ml   Net 2242 66 ml       Physical Exam:   Physical Exam  Constitutional:       General: She is not in acute distress       Appearance: She is not ill-appearing, toxic-appearing or diaphoretic  HENT:      Head: Normocephalic and atraumatic  Mouth/Throat:      Pharynx: Oropharynx is clear  Eyes:      Conjunctiva/sclera: Conjunctivae normal    Cardiovascular:      Rate and Rhythm: Normal rate  Heart sounds: No murmur heard  No friction rub  No gallop  Pulmonary:      Effort: No respiratory distress  Breath sounds: No stridor  No wheezing, rhonchi or rales  Chest:      Chest wall: No tenderness  Abdominal:      General: There is no distension  Palpations: There is no mass  Tenderness: There is no abdominal tenderness  There is no right CVA tenderness, left CVA tenderness, guarding or rebound  Hernia: No hernia is present  Musculoskeletal:         General: Swelling, tenderness (left knee ) and signs of injury (left knee ace awrap in place ) present  No deformity  Right lower leg: No edema  Left lower leg: No edema  Skin:     Coloration: Skin is not jaundiced or pale  Findings: No bruising, erythema, lesion or rash  Neurological:      Mental Status: She is oriented to person, place, and time     Psychiatric:      Comments: Very talkative a little anxious           Additional Data:     Labs:  Results from last 7 days   Lab Units 08/25/21  0628   WBC Thousand/uL 6 49   HEMOGLOBIN g/dL 12 5   HEMATOCRIT % 35 2   PLATELETS Thousands/uL 339   NEUTROS PCT % 52   LYMPHS PCT % 35   MONOS PCT % 6   EOS PCT % 5     Results from last 7 days   Lab Units 08/25/21  0628 08/23/21  0616   SODIUM mmol/L 138 136   POTASSIUM mmol/L 3 7 3 7   CHLORIDE mmol/L 108 105   CO2 mmol/L 28 27   BUN mg/dL 15 17   CREATININE mg/dL 0 43* 0 57*   ANION GAP mmol/L 2* 4   CALCIUM mg/dL 8 1* 7 9*   ALBUMIN g/dL  --  2 2*   TOTAL BILIRUBIN mg/dL  --  0 15*   ALK PHOS U/L  --  92   ALT U/L  --  15   AST U/L  --  5   GLUCOSE RANDOM mg/dL 121 281*     Results from last 7 days   Lab Units 08/23/21  0616   INR  1 01     Results from last 7 days   Lab Units 08/26/21  1031 08/26/21  0727 08/25/21  2047 08/25/21  1951 08/25/21  1053 08/25/21  0627 08/24/21  2057 08/24/21  1559 08/24/21  1039 08/24/21  0603 08/23/21  2048 08/23/21  1611   POC GLUCOSE mg/dl 381* 221* 134 114 110 138 198* 91 199* 242* 247* 222*     Results from last 7 days   Lab Units 08/24/21  0554 08/22/21  2309   HEMOGLOBIN A1C % 9 9* 9 5*     Results from last 7 days   Lab Units 08/23/21  0616 08/22/21  2309 08/22/21  1518   LACTIC ACID mmol/L  --   --  1 1   PROCALCITONIN ng/ml <0 05 <0 05  --        Lines/Drains:  Invasive Devices     Peripheral Intravenous Line            Peripheral IV 08/23/21 Left Antecubital 3 days    Peripheral IV 08/25/21 Dorsal (posterior); Right Forearm 1 day                      Imaging: Reviewed radiology reports from this admission including: lianne smith and imaging     Recent Cultures (last 7 days):   Results from last 7 days   Lab Units 08/25/21  1002 08/25/21  0826 08/22/21  2309 08/22/21  1552 08/22/21  1518   BLOOD CULTURE   --   --  No Growth at 72 hrs  No Growth at 72 hrs  No Growth at 72 hrs  No Growth at 72 hrs     GRAM STAIN RESULT  No No Polys or Bacteria seen 2+ Polys*  1+ Gram positive cocci in clusters*  --   --   --        Last 24 Hours Medication List:   Current Facility-Administered Medications   Medication Dose Route Frequency Provider Last Rate    acetaminophen  650 mg Oral Q6H PRN Tobi Moya MD      aluminum-magnesium hydroxide-simethicone  30 mL Oral Q6H PRN Tobi Moya MD      atorvastatin  40 mg Oral Daily With Dayday Wall MD      chlorproMAZINE  100 mg Oral HS Tobi Moya MD      clopidogrel  75 mg Oral Daily Makenna Marin MD      docusate sodium  100 mg Oral BID PRN Tobi Moya MD      heparin (porcine)  3-30 Units/kg/hr (Order-Specific) Intravenous Titrated Tobi Moya MD 9 6 Units/kg/hr (08/26/21 0649)    heparin (porcine)  3,800 Units Intravenous Q1H PRN Tobi Moya MD      heparin (porcine)  7,600 Units Intravenous Q1H PRN Erika Fournier, MD      HYDROmorphone  0 2 mg Intravenous Q4H PRN Prabhakar Hearing, PA-C      hydrOXYzine HCL  50 mg Oral TID PRN Prabhakar Hearing, PA-C      insulin glargine  30 Units Subcutaneous QAM Charmian Lipps, DO      insulin lispro  1-5 Units Subcutaneous HS Aaliyah Sary Magnus, DO      insulin lispro  2-12 Units Subcutaneous TID AC Aaliyah Sary Magnus, DO      insulin lispro  5 Units Subcutaneous TID With Meals Charmian Lipps, DO      lamoTRIgine  25 mg Oral Daily Erika Fournier MD      lisinopril  20 mg Oral Daily Kaya Beltran, ENOCH      lurasidone  80 mg Oral Daily With Breakfast Charmian Lipps, DO      nicotine  1 patch Transdermal Daily Erika Fournier MD      ondansetron  4 mg Intravenous Q6H PRN Erika Fournier, MD      oxyCODONE  5 mg Oral Q4H PRN Prabhakar Hearing, ENOCH      prazosin  1 mg Oral HS Erika Fournier, MD      QUEtiapine  300 mg Oral HS Prabhakar Hearing, PA-C      sodium chloride  75 mL/hr Intravenous Continuous Prabhakar Hearing, PA-C 75 mL/hr (08/26/21 1016)    traZODone  200 mg Oral HS Erika Fournier, MD      vancomycin  15 mg/kg Intravenous Q8H Prabhakar Hearing, PA-C 1,500 mg (08/26/21 0246)        Today, Patient Was Seen By: EMILE Richardson    **Please Note: This note may have been constructed using a voice recognition system  **

## 2021-08-26 NOTE — ASSESSMENT & PLAN NOTE
· Pt presented with worsening pain of the left knee, redness and drainage   · S/p CT LE revealing:  Skin thickening overlying the prepatellar soft tissues which demonstrates underlying focal subcutaneous stranding which may be due to cellulitis or early prepatellar bursitis  No evidence of discrete fluid collection to suggest drainable abscess  No evidence of knee joint effusion    · Likely in setting of underlying cellulitis, possible joint involvement    · Currently on IV Vancomycin Q8H, pharmacy consultation for dosing   · Ortho following,  · Pt is s/p left knee prepatellar bursa aspiration - now growing gram positive cocci in clusters  · - discussed with id will see for plan moving forward   · Follow up culture results and titrate abx based on this  · MRSA swab nares positive  · Due to difficulty stick inability to obtain labs will place picc line - blood cultures remain negative 72 hr   · Blood cultures negative x 72 hours, pt not meeting SIRS or sepsis criteria   · Arterial duplex with evidence of SFA stent occlusion and popliteal stenosis   · PRN pain management  · Vascular following, pt for IR angiogram with possible intervention today

## 2021-08-26 NOTE — CONSULTS
Consultation - Infectious Disease   Deepti Rodriguez 30 y o  female MRN: [id number]  Unit/Bed#: -01 Encounter: [id number]      IMPRESSION & RECOMMENDATIONS:   1  Left prepatellar bursitis  -patient presented with left knee wound, erythema and pain  -patient is afebrile, no leukocytosis  -CT Left extremity revealed Skin thickening overlying the prepatellar soft tissues which demonstrates underlying focal subcutaneous stranding which may be due to cellulitis or early prepatellar bursitis  -s/p prepatellar bursa aspiration by ortho on 8/25/2021, 4 ml of seropurulent fluid was aspirated  G stain reviewed 1+ g positive cocci in cluster  Fluid Cx pending   -a blood culture negative, MRSA culture positive  -likely septic bursitis due to Staph infection  -patient is afebrile and hemodynamically stable  -continue vanc, will transition to po bactrim if patient is ready for discharge  -continue to trend fever curve, vitals and WBC  -follow-up aspiration fluid culture  -counseling patient on avoiding bending on knee directly and use cushion whenever possible    2  PAD  - s/p LLE angiogram with L SFA stent placement on 8/11/21  - SFA stent occlusion s/p LLE intervention w/ recannalization on 8/25/21  -Left foot wound with black eschar, no drainage, no sign of infection    3  T2DM   - uncontrolled, recent HbA1C 9 9  -management per SLIM    4  COPD  -stable, not in exacerbation  -continue monitor    5  Bipolar disorder  -Mood appears stable     6  Hypertension  -BP is elevated  -continue monitor BP  -management per SLIM    7  Morbid obesity  -BMI 43 4  -diet and weight loss    Antibiotics:  #1 Vancomycin D3  Total antibiotics 5 days      HISTORY OF PRESENT ILLNESS:  Reason for Consult:  Left knee drainage gram stain positive    HPI: Deepti Rodriguez is a 30 y o  female with a past medical history of mobility obesity, PAD status post SFA stenting, hypertension and type 2 diabetes presented with left knee pain    Patient initially presented to [facility] ED on 2021 due to left knee pain, warmth and erythema  Patient denies any wound, fell on left knee  Patient denies fever or chills  Patient was recently discharged due to PDA and SFA stenting  Vascular ultrasound performed at [facility] ED reviewed reocclusion of recently placed stent  Patient was [facility] from [facility] to [facility] for further management  CT Left extremity revealed Skin thickening overlying the prepatellar soft tissues which demonstrates underlying focal subcutaneous stranding which may be due to cellulitis or early prepatellar bursitis  Orthopedic consulted and consider no evidence of septic joint  Aspiration of prepatellar bursa obtained for meal of serum fluent fluid and Gram stain revealed 1+ Gram-positive coccus in clusters, likely on staph  MRSA culture was positive  Patient was treated with the cefazolin for 2 days and followed by vancomycin  ID was consulted for left knee drainage gram stain positive  REVIEW OF SYSTEMS:    A complete system-based review of systems is negative other than noted in HPI      PAST MEDICAL HISTORY:  Past Medical History:   Diagnosis Date    Asthma     [facility]     COPD (chronic obstructive pulmonary disease) ([facility]     Depression     Diabetes mellitus ([facility]     Hypertension     Psychiatric disorder     cutting history    PTSD (post-traumatic stress disorder)     Tendonitis      Past Surgical History:   Procedure Laterality Date     SECTION      EAR SURGERY      IR LOWER EXTREMITY ANGIOGRAM  2021    IR LOWER EXTREMITY ANGIOGRAM  2021       FAMILY HISTORY:  Non-contributory    SOCIAL HISTORY:  Social History     Substance and Sexual Activity   Alcohol Use Not Currently     Social History     Substance and Sexual Activity   Drug Use Not Currently    Types: Cocaine, Marijuana    Comment: 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     Social History     Tobacco Use Smoking Status Current Every Day Smoker    Packs/day: 2 00    Years: 17 00    Pack years: 34 00    Types: Cigarettes   Smokeless Tobacco Never Used   Tobacco Comment    pt is down to 4 cigarettes a day        ALLERGIES:  No Known Allergies    MEDICATIONS:  All current active medications have been reviewed  PHYSICAL EXAM:  Vitals:  Temp:  [97 4 °F (36 3 °C)-98 8 °F (37 1 °C)] 98 8 °F (37 1 °C)  HR:  [82-95] 95  Resp:  [8-23] 16  BP: (109-160)/() 156/88  SpO2:  [98 %-99 %] 98 %  Temp (24hrs), Av 1 °F (36 7 °C), Min:97 4 °F (36 3 °C), Max:98 8 °F (37 1 °C)  Current: Temperature: 98 8 °F (37 1 °C)     Physical Exam:    General:  Well-nourished, well-developed, in no acute distress  Eyes:  Conjunctive clear with no hemorrhages or effusions  Oropharynx:  No ulcers, no lesions  Neck:  Supple, no lymphadenopathy  Lungs:  Clear to auscultation bilaterally, no accessory muscle use  Cardiac:  Regular rate and rhythm, no murmurs  Abdomen:  Soft, non-tender, non-distended  Extremities:  Left knee was wrapped status post drainage  Left black scar presented on lateral left foot  No peripheral cyanosis, clubbing, or edema  Skin:  No rashes  Neurological:  Moves all four extremities spontaneously, sensation grossly intact      LABS, IMAGING, & OTHER STUDIES:  Lab Results:  I have personally reviewed pertinent labs    Results from last 7 days   Lab Units 21  0628 21  0554 21  0616   POTASSIUM mmol/L 3 7 4 0 3 7   CHLORIDE mmol/L 108 107 105   CO2 mmol/L 28 24 27   BUN mg/dL 15 19 17   CREATININE mg/dL 0 43* 0 54* 0 57*   EGFR ml/min/1 73sq m 153 142 140   CALCIUM mg/dL 8 1* 7 8* 7 9*   AST U/L  --   --  5   ALT U/L  --   --  15   ALK PHOS U/L  --   --  92     Results from last 7 days   Lab Units 21  1214 21  0628 21  0554   WBC Thousand/uL 8 38 6 49 6 71   HEMOGLOBIN g/dL 11 1* 12 5 12 7   PLATELETS Thousands/uL 275 339 334     Results from last 7 days   Lab Units 08/25/21  1002 08/25/21  0826 08/22/21  2309 08/22/21  1552 08/22/21  1518   BLOOD CULTURE   --   --  No Growth at 72 hrs  No Growth at 72 hrs  No Growth at 72 hrs  No Growth at 72 hrs  GRAM STAIN RESULT  No No Polys or Bacteria seen 2+ Polys*  1+ Gram positive cocci in clusters*  --   --   --    MRSA CULTURE ONLY   --   --  Methicillin Resistant Staphylococcus aureus isolated*  This patient requires contact isolation precautions per [place] law  Contact precautions are not required in [place] for nasal surveillance cultures  --   --          Imaging Studies:   I have personally reviewed pertinent imaging study reports and images in PACS  EKG, Pathology, and Other Studies:   I have personally reviewed pertinent reports

## 2021-08-26 NOTE — OCCUPATIONAL THERAPY NOTE
OT cancel note:    Pt fast asleep, difficulty to wake up for OT evaluation  Will continue to follow and evaluate at a better time

## 2021-08-26 NOTE — PROCEDURES
Insert PICC line    Date/Time: 8/26/2021 11:53 AM  Performed by: Min Pollock RN  Authorized by: EMILE Aiken     Patient location:  Bedside  Other Assisting Provider: Yes (comment) Trina LEIVA)    Consent:     Consent obtained:  Written (Consent obtained by physician)    Consent given by:  Patient  Universal protocol:     Procedure explained and questions answered to patient or proxy's satisfaction: yes      Relevant documents present and verified: yes      Test results available and properly labeled: yes      Radiology Images displayed and confirmed  If images not available, report reviewed: yes      Required blood products, implants, devices, and special equipment available: yes      Site/side marked: yes      Immediately prior to procedure, a time out was called: yes      Patient identity confirmed:  Verbally with patient and arm band  Pre-procedure details:     Hand hygiene: Hand hygiene performed prior to insertion      Sterile barrier technique: All elements of maximal sterile technique followed      Skin preparation:  ChloraPrep    Skin preparation agent: Skin preparation agent completely dried prior to procedure    Indications:     PICC line indications: no peripheral vascular access    Anesthesia (see MAR for exact dosages):      Anesthesia method:  Local infiltration    Local anesthetic:  Lidocaine 1% w/o epi (3ml)  Procedure details:     Location:  Basilic    Vessel type: vein      Laterality:  Right    Approach: percutaneous technique used      Patient position:  Flat    Procedural supplies:  Double lumen    Catheter size:  5 Fr    Landmarks identified: yes      Ultrasound guidance: yes      Ultrasound image availability:  Not saved    Sterile ultrasound techniques: Sterile gel and sterile probe covers were used      Number of attempts:  1    Successful placement: yes      Vessel of catheter tip end:  Sherlock 3CG confirmed    Total catheter length (cm):  44    Catheter out on skin (cm):  1    Max flow rate:  999    Arm circumference:  37  Post-procedure details:     Post-procedure:  Securement device placed and dressing applied    Assessment:  Blood return through all ports and free fluid flow    Post-procedure complications: none      Patient tolerance of procedure:   Tolerated well, no immediate complications

## 2021-08-26 NOTE — ASSESSMENT & PLAN NOTE
29-year-old female with PMHx significant for DM, COPD, HTN, h/o TIA, Bipolar with nonhealing left 5th toe ulcer s/p LLE angiogram with L SFA stent placement on 8/11/21 (primary runoff PT and peroneal) presenting with primary complaints of left knee pain, redness and purulent drainage  Work-up included arterial duplex with evidence of SFA stent occlusion and popliteal stenosis  Her primary complaints are concerning for either superficial abscess or underlying infectious process of the knee  This is unrelated to SFA stent occlusion and currently has no evidence of acute limb ischemia requiring emergent intervention at this time       Now s/p Agram with IR:   - Successful recanalization of occluded L-SFA stent w/thrombectomy  - Chronic appearing stenoses at prox/distal ends of stent, angioplastied using 6mm balloon  - Residual clot and stenoses at distal end of stent  - Stent extended distally into prox pop artery using 6mm x 120mm Viola stent  - Dominant flow to left foot through the PT, peroneal appeared diminutive, AT was chronically occluded    Plan:  -Continue Plavix, hep gtt, statin   -Primary team to consider transition from hep gtt to oral anticoagulation   -No plan for further vascular interventions at this time  -Continue antibiotics per primary team  -Medical management per primary team

## 2021-08-26 NOTE — PROGRESS NOTES
Vitals:   Blood Pressure: 156/88 (08/26/21 0729)  Pulse: 95 (08/26/21 0729)  Temperature: 98 8 °F (37 1 °C) (08/26/21 0729)  Temp Source: Oral (08/26/21 0729)  Respirations: 16 (08/26/21 0729)  Height: 5' 1" (154 9 cm) (08/23/21 0801)  Weight - Scale: 104 kg (229 lb 8 oz) (08/26/21 0600)  SpO2: 98 % (08/26/21 0729)    I/O       08/24 0701 - 08/25 0700 08/25 0701 - 08/26 0700 08/26 0701 - 08/27 0700    P  O  1060 900     I V  (mL/kg) 2514 6 (24 4) 1173 9 (11 3)     IV Piggyback 121 7      Total Intake(mL/kg) 3696 3 (35 9) 2073 9 (19 9)     Urine (mL/kg/hr)  1200 (0 5)     Blood  300     Total Output  1500     Net +3696 3 +573 9            Unmeasured Urine Occurrence  2 x             Meds/Allergies   current meds:   Current Facility-Administered Medications   Medication Dose Route Frequency    acetaminophen (TYLENOL) tablet 650 mg  650 mg Oral Q6H PRN    aluminum-magnesium hydroxide-simethicone (MYLANTA) oral suspension 30 mL  30 mL Oral Q6H PRN    atorvastatin (LIPITOR) tablet 40 mg  40 mg Oral Daily With Dinner    chlorproMAZINE (THORAZINE) tablet 100 mg  100 mg Oral HS    clopidogrel (PLAVIX) tablet 75 mg  75 mg Oral Daily    docusate sodium (COLACE) capsule 100 mg  100 mg Oral BID PRN    heparin (porcine) 25,000 units in 0 45% NaCl 250 mL infusion (premix)  3-30 Units/kg/hr (Order-Specific) Intravenous Titrated    heparin (porcine) injection 3,800 Units  3,800 Units Intravenous Q1H PRN    heparin (porcine) injection 7,600 Units  7,600 Units Intravenous Q1H PRN    HYDROmorphone HCl (DILAUDID) injection 0 2 mg  0 2 mg Intravenous Q4H PRN    hydrOXYzine HCL (ATARAX) tablet 50 mg  50 mg Oral TID PRN    insulin glargine (LANTUS) subcutaneous injection 30 Units 0 3 mL  30 Units Subcutaneous QAM    insulin lispro (HumaLOG) 100 units/mL subcutaneous injection 1-5 Units  1-5 Units Subcutaneous HS    insulin lispro (HumaLOG) 100 units/mL subcutaneous injection 2-12 Units  2-12 Units Subcutaneous TID AC    insulin lispro (HumaLOG) 100 units/mL subcutaneous injection 5 Units  5 Units Subcutaneous TID With Meals    lamoTRIgine (LaMICtal) tablet 25 mg  25 mg Oral Daily    lisinopril (ZESTRIL) tablet 20 mg  20 mg Oral Daily    lurasidone (LATUDA) tablet 80 mg  80 mg Oral Daily With Breakfast    nicotine (NICODERM CQ) 21 mg/24 hr TD 24 hr patch 1 patch  1 patch Transdermal Daily    ondansetron (ZOFRAN) injection 4 mg  4 mg Intravenous Q6H PRN    oxyCODONE (ROXICODONE) IR tablet 5 mg  5 mg Oral Q4H PRN    prazosin (MINIPRESS) capsule 1 mg  1 mg Oral HS    QUEtiapine (SEROquel) tablet 300 mg  300 mg Oral HS    sodium chloride 0 9 % infusion  75 mL/hr Intravenous Continuous    traZODone (DESYREL) tablet 200 mg  200 mg Oral HS    vancomycin (VANCOCIN) 1500 mg in sodium chloride 0 9% 250 mL IVPB  15 mg/kg Intravenous Q8H     No Known Allergies          Physical Exam:    Gen: No acute distress  CV: Warm, regular rate   Pulm: Normal work of breathing, no respiratory distress   Abd: Soft, nondistended  Groin dressing clean/dry/intact, no expanding hematoma, L dopplerable AT/PT signals, L knee wrapped with ACE, R Dopplerable DP/PT signals  Neuro: moving all extremities, m/s grossly intact throughout b/l lower extremities      Labs:    Lab Results   Component Value Date    WBC 6 49 08/25/2021    HGB 12 5 08/25/2021    HCT 35 2 08/25/2021    MCV 81 (L) 08/25/2021     08/25/2021        Imaging:    X-ray chest 1 view portable    Result Date: 8/23/2021  Impression: No acute cardiopulmonary disease  Workstation performed: [id number]     XR knee 1 or 2 vw left    Result Date: 8/25/2021  Impression: No acute osseous abnormality  Workstation performed: [id number]     IR lower extremity angiogram    Result Date: 8/25/2021  Impression: 1  Successful recanalization of completely occluded left SFA stent   2  Thrombectomy of the occluded stent performed using TPA PulseSpray thrombolysis, AngioJet thrombectomy, [facility] thrombectomy  3  There remained chronic-appearing stenoses at the proximal and distal margins of the stent which were successfully angioplastied using 6 mm balloon  4  There appeared to be chronic stenoses immediately distal to the stent  The left SFA stent was extended distally into the proximal popliteal artery using 6 mm x 120 mm Viola stent  5  There remained dominant flow into the left foot through the posterior tibial artery  Peroneal artery appeared to be diminutive  Anterior tibial artery appeared chronically occluded  Plan: - Flat bed rest for 2 hours postprocedure - Monitor for improvement in left lower extremity ischemic symptoms _______________________________________________________________ PROCEDURE SUMMARY: - Arterial access with ultrasound guidance - Left lower extremity angiography - Thrombolysis and thrombectomy of occluded left SFA stent - Distal left SFA and proximal left popliteal artery stenting PROCEDURE DETAILS: Pre-procedure Consent: Informed consent for the procedure including risks, benefits and alternatives was obtained and time-out was performed prior to the procedure  Preparation: The site was prepared and draped using maximal sterile barrier technique including cutaneous antisepsis  Anesthesia/sedation Level of anesthesia/sedation: General anesthesia Anesthesia/sedation administered by: Anesthesiology Total intra-service sedation time (minutes): 180 Access Local anesthesia was administered  The vessel was sonographically evaluated and judged to be patent  Real time ultrasound was used to visualize needle entry into the vessel and a permanent image was stored  A 5 Albanian sheath was placed  Vessel accessed: Right common femoral artery Access technique: Micropuncture set with 21 gauge needle Left lower extremity angiography and interventions Omni Flush catheter was used to advanced across the aortic bifurcation from the right CFA access into the left CFA   Left lower extremity arterial runoff was performed  The 5 Uzbek sheath in the right CFA was exchanged for a 6 Uzbek 45 cm sheath which was advanced into the left CFA  Patient was systemically heparinized  PulseSpray TPA thrombolysis was performed throughout the occluded left SFA stent using AngioJet device  This was followed by AngioJet thrombectomy throughout the left SFA stent  Residual clots were noted within the distal portions of the stent and into the proximal popliteal artery  Additional suction thrombectomy was performed using Penumbra Cat 6 device  There was residual chronic appearing stenoses at the proximal and distal margins of the stent  Angioplasty was performed at these areas using 6 mm conventional angioplasty balloons  There appeared to be residual chronic clot in the distal SFA/proximal popliteal artery  Decision was made to extend the stent in the left SFA distally  Viola 6 mm x 120 mm stent was deployed extending from the distal SFA into the proximal popliteal artery  The newly placed stent was postdilated using 6 mm balloon at the proximal end  Post stent placement angiogram showed widely patent stents in the left SFA  Runoff angiogram into the left foot was performed to assess for distal emboli  Closure Access site angiography performed: Yes Findings: Patent vessel with appropriate access level Arterial closure technique: Perclose Hemostasis achieved from closure technique: Yes Contrast Contrast agent: Visipaque Contrast volume (mL): 150 Radiation Dose Fluoroscopy time (minutes): 52 3  Reference air kerma (mGy): 199 Additional Details Estimated blood loss (mL): 350 Attestation Signer name: [facility] I attest that I was present for the entire procedure  I reviewed the stored images and agree with the report as written  Workstation performed: [id number]     CT lower extremity w contrast left    Result Date: 8/23/2021  Impression: 1    Skin thickening overlying the prepatellar soft tissues which demonstrates underlying focal subcutaneous stranding which may be due to cellulitis or early prepatellar bursitis  No evidence of discrete fluid collection to suggest drainable abscess  No evidence of knee joint effusion   Workstation performed: [id number]             Shanell Kaplan MD   PGY1 General Surgery

## 2021-08-27 VITALS
RESPIRATION RATE: 21 BRPM | SYSTOLIC BLOOD PRESSURE: 132 MMHG | HEART RATE: 99 BPM | HEIGHT: 61 IN | DIASTOLIC BLOOD PRESSURE: 76 MMHG | BODY MASS INDEX: 43.33 KG/M2 | OXYGEN SATURATION: 97 % | TEMPERATURE: 99.1 F | WEIGHT: 229.5 LBS

## 2021-08-27 LAB
ANION GAP SERPL CALCULATED.3IONS-SCNC: 3 MMOL/L (ref 4–13)
ANION GAP SERPL CALCULATED.3IONS-SCNC: 4 MMOL/L (ref 4–13)
APTT PPP: 81 SECONDS (ref 23–37)
BACTERIA BLD CULT: NORMAL
BACTERIA BLD CULT: NORMAL
BACTERIA SPEC ANAEROBE CULT: NORMAL
BACTERIA SPEC BFLD CULT: ABNORMAL
BASOPHILS # BLD AUTO: 0.02 THOUSANDS/ΜL (ref 0–0.1)
BASOPHILS NFR BLD AUTO: 0 % (ref 0–1)
BUN SERPL-MCNC: 15 MG/DL (ref 5–25)
BUN SERPL-MCNC: 18 MG/DL (ref 5–25)
CALCIUM SERPL-MCNC: 8 MG/DL (ref 8.3–10.1)
CALCIUM SERPL-MCNC: 8.2 MG/DL (ref 8.3–10.1)
CHLORIDE SERPL-SCNC: 108 MMOL/L (ref 100–108)
CHLORIDE SERPL-SCNC: 108 MMOL/L (ref 100–108)
CO2 SERPL-SCNC: 26 MMOL/L (ref 21–32)
CO2 SERPL-SCNC: 28 MMOL/L (ref 21–32)
CREAT SERPL-MCNC: 0.8 MG/DL (ref 0.6–1.3)
CREAT SERPL-MCNC: 0.9 MG/DL (ref 0.6–1.3)
EOSINOPHIL # BLD AUTO: 0.34 THOUSAND/ΜL (ref 0–0.61)
EOSINOPHIL NFR BLD AUTO: 4 % (ref 0–6)
ERYTHROCYTE [DISTWIDTH] IN BLOOD BY AUTOMATED COUNT: 13.2 % (ref 11.6–15.1)
GFR SERPL CREATININE-BSD FRML MDRD: 111 ML/MIN/1.73SQ M
GFR SERPL CREATININE-BSD FRML MDRD: 96 ML/MIN/1.73SQ M
GLUCOSE SERPL-MCNC: 157 MG/DL (ref 65–140)
GLUCOSE SERPL-MCNC: 175 MG/DL (ref 65–140)
GLUCOSE SERPL-MCNC: 184 MG/DL (ref 65–140)
GLUCOSE SERPL-MCNC: 195 MG/DL (ref 65–140)
GRAM STN SPEC: ABNORMAL
GRAM STN SPEC: ABNORMAL
HCT VFR BLD AUTO: 27.2 % (ref 34.8–46.1)
HGB BLD-MCNC: 10 G/DL (ref 11.5–15.4)
IMM GRANULOCYTES # BLD AUTO: 0.07 THOUSAND/UL (ref 0–0.2)
IMM GRANULOCYTES NFR BLD AUTO: 1 % (ref 0–2)
LYMPHOCYTES # BLD AUTO: 1.65 THOUSANDS/ΜL (ref 0.6–4.47)
LYMPHOCYTES NFR BLD AUTO: 19 % (ref 14–44)
MCH RBC QN AUTO: 29.2 PG (ref 26.8–34.3)
MCHC RBC AUTO-ENTMCNC: 36.8 G/DL (ref 31.4–37.4)
MCV RBC AUTO: 79 FL (ref 82–98)
MONOCYTES # BLD AUTO: 0.62 THOUSAND/ΜL (ref 0.17–1.22)
MONOCYTES NFR BLD AUTO: 7 % (ref 4–12)
NEUTROPHILS # BLD AUTO: 5.9 THOUSANDS/ΜL (ref 1.85–7.62)
NEUTS SEG NFR BLD AUTO: 69 % (ref 43–75)
NRBC BLD AUTO-RTO: 0 /100 WBCS
PLATELET # BLD AUTO: 248 THOUSANDS/UL (ref 149–390)
PMV BLD AUTO: 10 FL (ref 8.9–12.7)
POTASSIUM SERPL-SCNC: 3.5 MMOL/L (ref 3.5–5.3)
POTASSIUM SERPL-SCNC: 3.5 MMOL/L (ref 3.5–5.3)
RBC # BLD AUTO: 3.43 MILLION/UL (ref 3.81–5.12)
SODIUM SERPL-SCNC: 138 MMOL/L (ref 136–145)
SODIUM SERPL-SCNC: 139 MMOL/L (ref 136–145)
VANCOMYCIN SERPL-MCNC: 23.1 UG/ML
VANCOMYCIN TROUGH SERPL-MCNC: 13.4 UG/ML (ref 10–20)
WBC # BLD AUTO: 8.6 THOUSAND/UL (ref 4.31–10.16)

## 2021-08-27 PROCEDURE — 99232 SBSQ HOSP IP/OBS MODERATE 35: CPT | Performed by: INTERNAL MEDICINE

## 2021-08-27 PROCEDURE — 82948 REAGENT STRIP/BLOOD GLUCOSE: CPT

## 2021-08-27 PROCEDURE — 99239 HOSP IP/OBS DSCHRG MGMT >30: CPT | Performed by: NURSE PRACTITIONER

## 2021-08-27 PROCEDURE — 85025 COMPLETE CBC W/AUTO DIFF WBC: CPT | Performed by: PHYSICIAN ASSISTANT

## 2021-08-27 PROCEDURE — NC001 PR NO CHARGE: Performed by: ORTHOPAEDIC SURGERY

## 2021-08-27 PROCEDURE — 80202 ASSAY OF VANCOMYCIN: CPT | Performed by: INTERNAL MEDICINE

## 2021-08-27 PROCEDURE — 85730 THROMBOPLASTIN TIME PARTIAL: CPT | Performed by: INTERNAL MEDICINE

## 2021-08-27 PROCEDURE — 80048 BASIC METABOLIC PNL TOTAL CA: CPT | Performed by: INTERNAL MEDICINE

## 2021-08-27 RX ORDER — INSULIN GLARGINE 100 [IU]/ML
45 INJECTION, SOLUTION SUBCUTANEOUS EVERY MORNING
Qty: 10 ML | Refills: 0 | Status: SHIPPED | OUTPATIENT
Start: 2021-08-28 | End: 2021-08-27 | Stop reason: HOSPADM

## 2021-08-27 RX ORDER — NICOTINE 21 MG/24HR
1 PATCH, TRANSDERMAL 24 HOURS TRANSDERMAL DAILY
Qty: 7 PATCH | Refills: 0 | Status: SHIPPED | OUTPATIENT
Start: 2021-08-28

## 2021-08-27 RX ORDER — OXYCODONE HYDROCHLORIDE 5 MG/1
5 TABLET ORAL EVERY 6 HOURS PRN
Qty: 20 TABLET | Refills: 0 | Status: SHIPPED | OUTPATIENT
Start: 2021-08-27 | End: 2021-09-01

## 2021-08-27 RX ORDER — DOXYCYCLINE HYCLATE 100 MG/1
100 CAPSULE ORAL EVERY 12 HOURS SCHEDULED
Status: DISCONTINUED | OUTPATIENT
Start: 2021-08-27 | End: 2021-08-27 | Stop reason: HOSPADM

## 2021-08-27 RX ORDER — DOXYCYCLINE HYCLATE 100 MG/1
100 CAPSULE ORAL EVERY 12 HOURS SCHEDULED
Qty: 11 CAPSULE | Refills: 0 | Status: SHIPPED | OUTPATIENT
Start: 2021-08-27 | End: 2021-09-02

## 2021-08-27 RX ORDER — ACETAMINOPHEN 325 MG/1
650 TABLET ORAL EVERY 6 HOURS PRN
Refills: 0
Start: 2021-08-27

## 2021-08-27 RX ORDER — INSULIN GLARGINE 100 [IU]/ML
40 INJECTION, SOLUTION SUBCUTANEOUS DAILY
Qty: 18 ML | Refills: 0 | Status: SHIPPED | OUTPATIENT
Start: 2021-08-27 | End: 2021-09-30 | Stop reason: SDUPTHER

## 2021-08-27 RX ADMIN — DOXYCYCLINE 100 MG: 100 CAPSULE ORAL at 13:48

## 2021-08-27 RX ADMIN — NICOTINE 1 PATCH: 21 PATCH, EXTENDED RELEASE TRANSDERMAL at 09:09

## 2021-08-27 RX ADMIN — HYDROMORPHONE HYDROCHLORIDE 0.2 MG: 0.2 INJECTION, SOLUTION INTRAMUSCULAR; INTRAVENOUS; SUBCUTANEOUS at 11:57

## 2021-08-27 RX ADMIN — INSULIN LISPRO 2 UNITS: 100 INJECTION, SOLUTION INTRAVENOUS; SUBCUTANEOUS at 11:55

## 2021-08-27 RX ADMIN — LURASIDONE HYDROCHLORIDE 80 MG: 80 TABLET, FILM COATED ORAL at 09:06

## 2021-08-27 RX ADMIN — RIVAROXABAN 15 MG: 15 TABLET, FILM COATED ORAL at 11:55

## 2021-08-27 RX ADMIN — INSULIN LISPRO 2 UNITS: 100 INJECTION, SOLUTION INTRAVENOUS; SUBCUTANEOUS at 09:06

## 2021-08-27 RX ADMIN — SODIUM CHLORIDE 75 ML/HR: 0.9 INJECTION, SOLUTION INTRAVENOUS at 11:57

## 2021-08-27 RX ADMIN — INSULIN GLARGINE 45 UNITS: 100 INJECTION, SOLUTION SUBCUTANEOUS at 09:06

## 2021-08-27 RX ADMIN — CLOPIDOGREL BISULFATE 75 MG: 75 TABLET ORAL at 09:06

## 2021-08-27 RX ADMIN — OXYCODONE HYDROCHLORIDE 5 MG: 5 TABLET ORAL at 09:15

## 2021-08-27 NOTE — PROGRESS NOTES
Progress Note - Infectious Disease   Seth Olivares 30 y o  female MRN: [id number]  Unit/Bed#: -01 Encounter: [id number]      IMPRESSION & RECOMMENDATIONS:   1  Left septic prepatellar bursitis  -patient presented with left knee wound, erythema and pain  -patient is afebrile, no leukocytosis  -CT Left extremity revealed Skin thickening overlying the prepatellar soft tissues which demonstrates underlying focal subcutaneous stranding which may be due to cellulitis or early prepatellar bursitis  -s/p prepatellar bursa aspiration by ortho on 8/25/2021, 4 ml of seropurulent fluid was aspirated  G stain reviewed 1+ g positive cocci in cluster    -Body fluid culture grows 3+ MRSA  -a blood culture negative, MRSA screen positive  -patient is afebrile and hemodynamically stable  -continue vanc, will transition to po bactrim if patient clinically improves  -continue to trend fever curve, vitals and WBC  -counseling patient on avoiding bending on knee directly and use cushion whenever possible     2  PAD  - s/p LLE angiogram with L SFA stent placement on 8/11/21  - SFA stent occlusion s/p LLE intervention w/ recannalization on 8/25/21  -Left foot wound with black eschar, no drainage, no sign of infection     3  T2DM   - uncontrolled, recent HbA1C 9 9  -management per SLIM     4  COPD  -stable, not in exacerbation  -continue monitor     5  Bipolar disorder  -Mood appears stable      6  Hypertension  -BP is elevated  -continue monitor BP  -management per SLIM     7  Morbid obesity  -BMI 43 4  -diet and weight loss     Antibiotics:  #1 Vancomycin D4  Total antibiotics 6 days    Subjective:  Patient seen on AM rounds  Patient still complains of left knee pain, but slightly improved  Denies fevers, chills, or sweats  Denies nausea, vomiting, or diarrhea        Objective:  Vitals:  Temp:  [99 1 °F (37 3 °C)] 99 1 °F (37 3 °C)  HR:  [99] 99  Resp:  [19-21] 21  BP: (131-132)/(76-77) 132/76  SpO2:  [97 %] 97 %  Temp (24hrs), Av 1 °F (37 3 °C), Min:99 1 °F (37 3 °C), Max:99 1 °F (37 3 °C)  Current: Temperature: 99 1 °F (37 3 °C)    Physical Exam:   General:  Well-nourished, well-developed, in no acute distress  Eyes:  Conjunctive clear with no hemorrhages or effusions  Oropharynx:  No ulcers, no lesions  Neck:  Supple, no lymphadenopathy  Lungs:  Clear to auscultation bilaterally, no accessory muscle use  Cardiac:  Regular rate and rhythm, no murmurs  Abdomen:  Soft, non-tender, non-distended  Extremities:  Left knee was wrapped status post drainage  Callous present on both knees  Left black scar presented on lateral left foot  No peripheral cyanosis, clubbing, or edema  Skin:  No rashes  Neurological:  Moves all four extremities spontaneously, sensation grossly intact  Lab Results:  I have personally reviewed pertinent labs  Results from last 7 days   Lab Units 21  0259 21  1214 21  0628 21  0616   POTASSIUM mmol/L 3 5 3 8 3 7 3 7   CHLORIDE mmol/L 108 108 108 105   CO2 mmol/L 28 27 28 27   BUN mg/dL 18 19 15 17   CREATININE mg/dL 0 80 1 10 0 43* 0 57*   EGFR ml/min/1 73sq m 111 76 153 140   CALCIUM mg/dL 8 0* 7 9* 8 1* 7 9*   AST U/L  --   --   --  5   ALT U/L  --   --   --  15   ALK PHOS U/L  --   --   --  92     Results from last 7 days   Lab Units 21  1214 21  0628 21  0554   WBC Thousand/uL 8 38 6 49 6 71   HEMOGLOBIN g/dL 11 1* 12 5 12 7   PLATELETS Thousands/uL 275 339 334     Results from last 7 days   Lab Units 21  1002 21  0826 21  2309 21  1552 21  1518   BLOOD CULTURE   --   --  No Growth After 4 Days  No Growth After 4 Days  No Growth After 4 Days  No Growth After 4 Days     GRAM STAIN RESULT  No No Polys or Bacteria seen 2+ Polys*  1+ Gram positive cocci in clusters*  --   --   --    BODY FLUID CULTURE, STERILE   --  3+ Growth of Methicillin Resistant Staphylococcus aureus*  --   --   --    MRSA CULTURE ONLY   --   --  Methicillin Resistant Staphylococcus aureus isolated*  This patient requires contact isolation precautions per [place] law  Contact precautions are not required in [place] for nasal surveillance cultures  --   --        Imaging Studies:   I have personally reviewed pertinent imaging study reports and images in PACS  EKG, Pathology, and Other Studies:   I have personally reviewed pertinent reports

## 2021-08-27 NOTE — CONSULTS
Vancomycin Pharmacy to Dose Assessment  Patient's vancomycin has been discontinued and PO Doxycycline has been started  Pharmacy appreciates this consult and will sign off      Velma Spivey, UnaD

## 2021-08-27 NOTE — DISCHARGE SUMMARY
[address] [facility]  Discharge- [facility] 1985, 30 y o  female MRN: [id number]  Unit/Bed#: -[place] Encounter: [id number]  Primary Care Provider: Blayne Montes DO   Date and time admitted to hospital: 8/22/2021  8:58 PM    * Acute pain of left knee  Assessment & Plan  · Pt presented with worsening pain of the left knee, redness and drainage   · S/p CT LE revealing:  Skin thickening overlying the prepatellar soft tissues which demonstrates underlying focal subcutaneous stranding which may be due to cellulitis or early prepatellar bursitis  No evidence of discrete fluid collection to suggest drainable abscess  No evidence of knee joint effusion    · Likely in setting of underlying cellulitis, possible joint involvement    · Currently on IV Vancomycin Q8H, pharmacy consultation for dosing   · Ortho following,  · Pt is s/p left knee prepatellar bursa aspiration - today resulted in MRSA   · - discussed with id  Recommend dc home with po doxy for 10 days total abx   · MRSA swab nares positive  · Difficult to administer iv a  · Blood cultures negative x 72 hours, pt not meeting SIRS or sepsis criteria   · Arterial duplex with evidence of SFA stent occlusion and popliteal stenosis   · PRN pain management  · Vascular following, pt for IR angiogram with possible intervention today    Bipolar disorder ([facility] [address] )  Assessment & Plan  · Mood currently appears stable  · However noted to have intermittent waxing and waning mentation here with increased somnolence and lethargy, per nursing report pt was much more alert today after medication adjustments as below   · Pt has not utilized a significant amount of narcotics here, continue decreased PRN IV dilaudid to 0 2 mg Q4H for severe pain with PRN oxycodone 5 mg for severe pain   · Transition pt from scheduled atarax to PRN for anxiety and switch Seroquel 300 mg to QHS rather than in the AM  · Likely combination of significant psych medications in addition to narcotics for pain control   · Continue Thorazine 100 mg QHS, Latuda 80 mg daily, Trazodone 200 mg daily  · Monitor    Type 2 diabetes mellitus with diabetic polyneuropathy, with long-term current use of insulin ([facility])  Assessment & Plan    Lab Results   Component Value Date    HGBA1C 9 9 (H) 08/24/2021   · Appears uncontrolled as an outpatient as evidenced by hgbA1c  · Endocrinology following,  · Continue Lantus 30 units with scheduled humalog 5 units TID with meals   · Continue SSI coverage   · QID glucose checks  · Consistent carb diet   · Monitor and adjust regimen as needed    Peripheral artery disease ([facility] [address] )  Assessment & Plan  · Hx of nonhealing left 5th toe ulcer s/p LLE angiogram with L SFA stent placement on 8/11/21  · Arterial duplex performed here with evidence of SFA stent occlusion and popliteal stenosis   · Vascular surgery following,  S/P IR angiogram : - "Successful recanalization of occluded L-SFA stent w/thrombectomy  - Chronic appearing stenoses at prox/distal ends of stent, angioplastied using 6mm balloon  - Residual clot and stenoses at distal end of stent  - Stent extended distally into prox pop artery using 6mm x 120mm Viola stent" per IR and vascular   · Dc iv fluids today   · Pulmonary appreciated  for clearance from respiratory standpoint for anesthesia   · Discontinue IV heparin gtt transition to po xarelto , plavix and statin per vascular     Nicotine dependence  Assessment & Plan  · Continue nicotine replacement    · Encourage cessation    COPD (chronic obstructive pulmonary disease) ([facility])  Assessment & Plan  · Appreciate input per pulmonary, s/p perioperative assessment  · No PFT on file, unclear copd hx  · Regardless no signs of exacerbation, no indication for steroids at this time  · Ordered cpap qhs  · Pulm toilet -   · PFT as outpatient       Medical Problems     Resolved Problems  Date Reviewed: 8/27/2021    None              Discharging Physician / Practitioner: EMILE Garrison  PCP: Ksenia Bryant DO  Admission Date:   Admission Orders (From admission, onward)     Ordered        08/22/21 2158  Inpatient Admission  Once                   Discharge Date: 08/27/21    Consultations During Hospital Stay:  · Dr Jocelyn Ramírez infectious disease  · Dr Mariano Goldberg endocrinology   · [facility] orthopedics     Procedures Performed:   · 8/22/21 blood cultures x4 show no growth at 4 days  · Nasal swab positive for MRSA   · covid negative   · Gram stain from knee on 8/25  - no anaerobes  · Gram stain from knee  8/25 positive for MRSA   · Gram stain from knee on 8/25: no polys or bacteria   Significant Findings / Test Results:   · See above     Incidental Findings:   · None      Test Results Pending at Discharge (will require follow up):   8/22/21 blood cultures x4 show no growth at 4 days     Outpatient Tests Requested:  · Call to schedule follow up within the next week with your pcp   · Call to schedule follow up with     Complications:      Reason for Admission: left knee pain     Hospital Course:   Micheal Kaplan is a 30 y o  female patient who originally presented to the hospital on 8/22/2021 due to left knee pain  Patient has past medical history significant for morbid obesity, hyperlipidemia, GERD, attention and nicotine patch recently admitted for lesion of left superficial femoral artery status post  Stenting  Patient is doing well at home reportedly did not formal why she on her knees however over the past 24 hours she has noted that she had increased pain at the superficial knee area noted of them that tenderness  Also noted on admission by patient and provided that there was a dollar sized collection of fluid superficially  The patient originally went to [facility]'s vascular ultrasound demonstrated occlusion of the newly placed stent and therefore the patient was transferred to [facility] for further management with vascular    Patient was noted to have a left knee superficial swelling assessed by vascular that was nothing to do with the occlusion of stent itself  Patient was then transferred to [facility] Internal Medicine Service and Orthopedics were consulted for left knee pain with concern for left knee infection  Upon arrival patient was seen by vascular Service who recommended that patient would benefit from lower left extremity angiogram with intervention and orthopedic consultation for workup of left knee pain which was patient's original primary concern  Patient was seen by Orthopedics and on 08/25/2021 patient underwent left knee prepatellar bursa aspiration eventually resulting in positive MRSA  Patient also noted to have positive MRSA nasal swab  On 08/25/2021 patient underwent successful recanalization of completely occluded left SFA stent  Thrombectomy of occluded stent performed using tPA and the remaining chronic appearing stenosis of the proximal and distal margins and the stent was successfully angioplastied using 6 mm balloon  There appeared to be chronic stenosis immediately distal to the stent  The left SFA stent was extended distally into the proximal popliteal artery using 6 mm x 120 mm Viola stent  Postprocedure recommendations per vascular were to initiate Xarelto 15 mg b i d   Patient was seen by infectious disease for positive MRSA cultures on aspiration of left knee and patient had already been on vancomycin IV for total of 4 days and start of doxy on 8/27   Scripts will be call to pharmacy at discharge   Please see above list of diagnoses and related plan for additional information  Condition at Discharge: good    Discharge Day Visit / Exam:   Subjective:  Pt asks can I go home today ? She is excited to know that she can go home  She is having some pain in left knee that is now improving she is able to bend her leg   She has no n/v/d she has no fever chills   Vitals: Blood Pressure: 132/76 (08/27/21 0017)  Pulse: 99 (08/26/21 1528)  Temperature: 99 1 °F (37 3 °C) (08/26/21 1528)  Temp Source: Oral (08/26/21 0729)  Respirations: 21 (08/27/21 0017)  Height: 5' 1" (154 9 cm) (08/23/21 0801)  Weight - Scale: 104 kg (229 lb 8 oz) (08/26/21 0600)  SpO2: 97 % (08/26/21 1927)  Exam:   Physical Exam  Constitutional:       General: She is not in acute distress  Appearance: She is obese  She is not ill-appearing, toxic-appearing or diaphoretic  HENT:      Head: Normocephalic  Eyes:      General:         Right eye: No discharge  Left eye: No discharge  Conjunctiva/sclera: Conjunctivae normal    Cardiovascular:      Rate and Rhythm: Normal rate  Heart sounds: No murmur heard  No friction rub  No gallop  Pulmonary:      Effort: No respiratory distress  Breath sounds: No stridor  No wheezing, rhonchi or rales  Chest:      Chest wall: No tenderness  Abdominal:      General: There is no distension  Palpations: There is no mass  Tenderness: There is no abdominal tenderness  There is no right CVA tenderness, left CVA tenderness, guarding or rebound  Hernia: No hernia is present  Musculoskeletal:         General: No swelling, tenderness (left knee with ace able to bend ), deformity or signs of injury  Right lower leg: No edema  Left lower leg: No edema  Skin:     Coloration: Skin is not jaundiced or pale  Findings: No bruising, erythema, lesion or rash  Neurological:      Mental Status: She is oriented to person, place, and time  Psychiatric:      Comments: Very talkative and excited to be going home           Discussion with Family: Updated  (significant other) at bedside  wife at bedside     Discharge instructions/Information to patient and family:   See after visit summary for information provided to patient and family  Provisions for Follow-Up Care:  See after visit summary for information related to follow-up care and any pertinent home health orders  Disposition:   Home    Planned Readmission:      Discharge Statement:  I spent 40 minutes discharging the patient  This time was spent on the day of discharge  I had direct contact with the patient on the day of discharge  Greater than 50% of the total time was spent examining patient, answering all patient questions, arranging and discussing plan of care with patient as well as directly providing post-discharge instructions  Additional time then spent on discharge activities  Discharge Medications:  See after visit summary for reconciled discharge medications provided to patient and/or family        **Please Note: This note may have been constructed using a voice recognition system**

## 2021-08-27 NOTE — DISCHARGE INSTRUCTIONS
DISCHARGE INSTRUCTIONS  ARTERIOGRAM/ANGIOPLASTY/STENT    Following discharge from the hospital, you may have some questions about your procedure, your activities or your general condition  These instructions may answer some of your questions and help you adjust during the first few weeks following your operation  ACTIVITY: The evening following the procedure you should be sure someone remains with you until the next morning  Rest as much as possible, sitting, lying or reclining  Use the stairs as little as possible  The following day, limit your activity to walking  Avoid stooping or heavy lifting (no more than 30 lbs) for the first three days  Walking up steps and normal activities may be resumed as you feel ready  You should not drive a car for at least two days following discharge from the hospital  You may ride in a car  If you have any questions regarding a particular activity, please discuss with your doctor or nurse before you are discharged  DIET:  Resume your normal diet  Try to eat low fat and low cholesterol foods  Drink more liquids than usual for the next 24 hours  INCISION: Your doctor may have chosen to use a type of adhesive glue, to close your incision  The glue is used to cover the incision, assist in closure, and prevent contamination  This adhesive will darken and peel away on its own within one to two weeks  You may shower after the procedure, but do not scrub the incision  Sitting in a tub is not recommended for the two days following the procedure or if you have any open wounds  It is normal to have some bruising, swelling or mild discoloration around the incision  IF increasing redness or pain develops, call our office immediately  If present, you may remove the band-aid or steri-strips over your wound after two days  If you notice any active bleeding at the site, apply pressure to the site and call our office ([phone])      FOLLOW UP STUDIES:  Your doctor will discuss whether further treatments or follow-up studies are necessary at your first post procedure visit  Appt w/ Dr Arreola Mood: 9/15/2021 at 1:30pm, [facility] office  [address] [facility], [address] [facility], [facility], [address] [facility]    [address] [facility] THE OFFICE IF YOU HAVE ANY QUESTIONS    [phone] Gilbert Ramsay [phone] [facility] FREE [phone]  [address] [place] [address] , [address], [facility] [place], [address]  [address], [address], [facility], 210 [facility]  [address], [place], [address]  611 [facility], One [facility],[address], [facility], [address] [facility] [address]    [address]rav[address] Verdin [address], 1st Floor, Guillermina Bess 34  [facility] 19, [address] [facility], [address] [address] [facility]  [address] [facility], [address] [facility], [facility], 9[address] [facility]  One [facility], 532 [facility], 30 [facility] 6  201 [facility], [facility], [address]  [address] [place] [address], [facility] 89                 ARTERIOGRAM    WHAT YOU SHOULD KNOW:   An angiogram is a procedure to look at arteries in your body  Arteries are the blood vessels that carry blood from your heart to your body  AFTER YOU LEAVE:     Self-care:   · Limit activity: Rest for the remainder of the day of your procedure  Have some one with you until the next morning  Keep your arm or leg straight as much as possible  Rest as much as possible, sitting lying or reclining  Walk only to go to the bathroom, to bed or to eat  If the angiogram catheter was put in your leg, use the stairs as little as possible  No driving  · Keep your wound clean and dry  You may shower 24 hours after your procedure  The bandage you have on should fall off in 2-3 days  If there is any drainage from the puncture site, you should put on a clean bandage  · Watch for bleeding and bruising: It is normal to have a bruise and soreness where the angiogram catheter went in  · Diet:   · You may resume your regular diet, Sips of flat soda will help with mild nausea    · Drink more liquids than usual for the next 24 hours      · IMMEDIATELY Contact Interventional Radiology at [phone] Corinne PATIENTS: Contact Interventional Radiology at [phone]) Nilda Lashell PATIENTS: Contact Interventional Radiology at [phone]) if any of the following occur:  · If your bruise gets larger or if you notice any active bleeding  APPLY DIRECT PRESSURE TO THE BLEEDING SITE  · If you notice increased swelling or have increased pain at the puncture site   · If you have any numbness or pain in the extremity of the puncture site   · If that extremity seems cold or pale  · You have fever greater than 101  · Persistent nausea or vomitting    Follow up with your primary healthcare provider  as directed: Write down your questions so you remember to ask them during your visits        Discharge Instructions - Orthopedics  Paul Young 30 y o  female MRN: [id number]  Unit/Bed#: [facility] 57-01    Weight Bearing Status:                                           As tolerated    DVT prophylaxis  Per primary team    Antibiotics:  Per Infectious disease recommendations    Pain:  Continue analgesics as directed    Dressing Instructions:   Please keep clean, dry and intact until follow up     Appt Instructions: If you do not have your appointment, please call the clinic at [phone] t  Otherwise followup as scheduled     Contact the office sooner if you experience any increased numbness/tingling in the extremities        Miscellaneous:  none

## 2021-08-27 NOTE — PROGRESS NOTES
Progress Note - Orthopedics   Annamarie Miller 30 y o  female MRN: [id number]  Unit/Bed#: [facility] 577-01      Subjective:    30 y  o female with left prepatellar bursitis  No acute events  Pain is Improving   Denies fevers chills, CP, SOB    Labs:  0   Lab Value Date/Time    HCT 31 1 (L) 08/26/2021 1214    HCT 35 2 08/25/2021 0628    HCT 35 8 08/24/2021 0554    HGB 11 1 (L) 08/26/2021 1214    HGB 12 5 08/25/2021 0628    HGB 12 7 08/24/2021 0554    INR 1 01 08/23/2021 0616    WBC 8 38 08/26/2021 1214    WBC 6 49 08/25/2021 0628    WBC 6 71 08/24/2021 0554       Meds:    Current Facility-Administered Medications:     acetaminophen (TYLENOL) tablet 650 mg, 650 mg, Oral, Q6H PRN, Aiden Sullivan MD, 650 mg at 08/25/21 2221    aluminum-magnesium hydroxide-simethicone (MYLANTA) oral suspension 30 mL, 30 mL, Oral, Q6H PRN, Aiden Sullivan MD    atorvastatin (LIPITOR) tablet 40 mg, 40 mg, Oral, Daily With Missy Becerra MD, 40 mg at 08/26/21 1713    chlorproMAZINE (THORAZINE) tablet 100 mg, 100 mg, Oral, HS, Aiden Sullivan MD, 100 mg at 08/26/21 2104    clopidogrel (PLAVIX) tablet 75 mg, 75 mg, Oral, Daily, Christiano Lamar MD, 75 mg at 08/26/21 4316    docusate sodium (COLACE) capsule 100 mg, 100 mg, Oral, BID PRN, Aiden Sullivan MD, 100 mg at 08/23/21 0005    heparin (porcine) 25,000 units in 0 45% NaCl 250 mL infusion (premix), 3-30 Units/kg/hr (Order-Specific), Intravenous, Titrated, Aiden Sullivan MD, Last Rate: 10 1 mL/hr at 08/26/21 2216, 10 6 Units/kg/hr at 08/26/21 2216    heparin (porcine) injection 3,800 Units, 3,800 Units, Intravenous, Q1H PRN, Aiedn Sullivan MD, 3,800 Units at 08/25/21 0710    heparin (porcine) injection 7,600 Units, 7,600 Units, Intravenous, Q1H PRN, Aiden Sullivan MD, 7,600 Units at 08/26/21 1347    HYDROmorphone HCl (DILAUDID) injection 0 2 mg, 0 2 mg, Intravenous, Q4H PRN, Shobha Lopes PA-C, 0 2 mg at 08/26/21 2107    hydrOXYzine HCL (ATARAX) tablet 50 mg, 50 mg, Oral, TID PRN, William Beltran PA-C    insulin glargine (LANTUS) subcutaneous injection 45 Units 0 45 mL, 45 Units, Subcutaneous, QAM, Kriss Galdmaez MD    insulin lispro (HumaLOG) 100 units/mL subcutaneous injection 1-5 Units, 1-5 Units, Subcutaneous, HS, Estela Flowers DO, 1 Units at 08/26/21 2206    insulin lispro (HumaLOG) 100 units/mL subcutaneous injection 15 Units, 15 Units, Subcutaneous, TID With Meals, Kriss Galdamez MD, 15 Units at 08/26/21 1713    insulin lispro (HumaLOG) 100 units/mL subcutaneous injection 2-12 Units, 2-12 Units, Subcutaneous, TID AC, 10 Units at 08/26/21 1216 **AND** Fingerstick Glucose (POCT), , , TID AC, Estela Flowers DO    lamoTRIgine (LaMICtal) tablet 25 mg, 25 mg, Oral, Daily, Doron Richmond MD, 25 mg at 08/25/21 0831    [START ON 8/28/2021] lisinopril (ZESTRIL) tablet 20 mg, 20 mg, Oral, Daily, EMILE Ventura    lurasidone (LATUDA) tablet 80 mg, 80 mg, Oral, Daily With Breakfast, Estela Flowers DO, 80 mg at 08/26/21 0814    nicotine (NICODERM CQ) 21 mg/24 hr TD 24 hr patch 1 patch, 1 patch, Transdermal, Daily, Doron Richmond MD, 1 patch at 08/26/21 0814    ondansetron (ZOFRAN) injection 4 mg, 4 mg, Intravenous, Q6H PRN, Doron Richmond MD    oxyCODONE (ROXICODONE) IR tablet 5 mg, 5 mg, Oral, Q4H PRN, William Beltran PA-C, 5 mg at 08/26/21 2010    prazosin (MINIPRESS) capsule 1 mg, 1 mg, Oral, HS, Doron Richmond MD, 1 mg at 08/26/21 2104    QUEtiapine (SEROquel) tablet 300 mg, 300 mg, Oral, HS, Kaya Beltran PA-C, 300 mg at 08/25/21 2212    sodium chloride 0 9 % infusion, 75 mL/hr, Intravenous, Continuous, Sonaj Carey PA-C, Last Rate: 75 mL/hr at 08/26/21 2217, 75 mL/hr at 08/26/21 2217    traZODone (DESYREL) tablet 200 mg, 200 mg, Oral, HS, oDron Richmond MD, 200 mg at 08/26/21 2105    vancomycin (VANCOCIN) IVPB (premix in dextrose) 1,000 mg 200 mL, 15 mg/kg (Adjusted), Intravenous, Daily PRN, Sonja Carey, ENOCH    Blood Culture:   Lab Results   Component Value Date    BLOODCX No Growth at 72 hrs  08/22/2021    BLOODCX No Growth at 72 hrs  08/22/2021       Wound Culture:   No results found for: WOUNDCULT    Ins and Outs:  I/O last 24 hours: In: 5886 4 [P O :1500; I V :4386 4]  Out: 1900 [Urine:1800; Blood:100]          Physical:  Vitals:    08/27/21 0017   BP: 132/76   Pulse:    Resp: 21   Temp:    SpO2:      Musculoskeletal: left Lower Extremity  · No erythema over anterior knee at superior pole of patella   · No palpable fluctuance   · Mild tenderness to palpation over superior aspect of patella  · SILT s/s/sp/dp/t    · +knee flex/extension, +fhl/ehl, +ankle dorsi/plantar flexion  · 2+ DP pulse    Assessment:    35 y  o female with left prepatellar bursitis s/p bedside aspiration  Cultures growing staph aureus  Clinical exam improving on IV antibiotics        Plan:  · WBAT LLE   · IV abx per primary   · Pain control per primary   · Medical management per primary   · Monitor clinical exam   · Dispo: Ortho will follow    Panfilo Marte MD

## 2021-08-27 NOTE — OCCUPATIONAL THERAPY NOTE
Occupational Therapy Cancellation Note        Patient Name: Marychuy Barksdale  UYZVOPaulO Date: 8/27/2021 08/27/21 1552   OT Last Visit   OT Visit Date 08/27/21   Note Type   Note type Screen     OT consult received, chart reviewed  Pt declining OT therapy at this time stating 'I am getting up and moving myself I have my wife at home to help me if I need it"  Pt denies need for DME  Will discharge pt from caseload at this time  Please re-consult if any concerns   Christiano CORADO, OTR/L

## 2021-08-27 NOTE — ASSESSMENT & PLAN NOTE
· Hx of nonhealing left 5th toe ulcer s/p LLE angiogram with L SFA stent placement on 8/11/21     · Arterial duplex performed here with evidence of SFA stent occlusion and popliteal stenosis   · Vascular surgery following,  S/P IR angiogram : - "Successful recanalization of occluded L-SFA stent w/thrombectomy  - Chronic appearing stenoses at prox/distal ends of stent, angioplastied using 6mm balloon  - Residual clot and stenoses at distal end of stent  - Stent extended distally into prox pop artery using 6mm x 120mm Viola stent" per IR and vascular   · Dc iv fluids today   · Pulmonary appreciated  for clearance from respiratory standpoint for anesthesia   · Discontinue IV heparin gtt transition to po xarelto , plavix and statin per vascular

## 2021-08-27 NOTE — ASSESSMENT & PLAN NOTE
· Pt presented with worsening pain of the left knee, redness and drainage   · S/p CT LE revealing:  Skin thickening overlying the prepatellar soft tissues which demonstrates underlying focal subcutaneous stranding which may be due to cellulitis or early prepatellar bursitis  No evidence of discrete fluid collection to suggest drainable abscess  No evidence of knee joint effusion    · Likely in setting of underlying cellulitis, possible joint involvement    · Currently on IV Vancomycin Q8H, pharmacy consultation for dosing   · Ortho following,  · Pt is s/p left knee prepatellar bursa aspiration - today resulted in MRSA   · - discussed with id  Recommend dc home with po doxy for 10 days total abx   · MRSA swab nares positive  · Difficult to administer iv a  · Blood cultures negative x 72 hours, pt not meeting SIRS or sepsis criteria   · Arterial duplex with evidence of SFA stent occlusion and popliteal stenosis   · PRN pain management  · Vascular following, pt for IR angiogram with possible intervention today

## 2021-08-27 NOTE — CASE MANAGEMENT
Cm advised pt is stable for d/c  Cm spoke with pt she does not need VNA however has no ride home  Cm scheduled lyft transport for pt and her spouse for pick-up at [address]

## 2021-08-28 LAB
BACTERIA BLD CULT: NORMAL
BACTERIA BLD CULT: NORMAL

## 2021-08-30 DIAGNOSIS — J44.9 CHRONIC OBSTRUCTIVE PULMONARY DISEASE, UNSPECIFIED COPD TYPE (HCC): ICD-10-CM

## 2021-08-31 RX ORDER — ALBUTEROL SULFATE 2.5 MG/3ML
SOLUTION RESPIRATORY (INHALATION)
Qty: 150 ML | Refills: 5 | Status: SHIPPED | OUTPATIENT
Start: 2021-08-31

## 2021-09-01 ENCOUNTER — TELEPHONE (OUTPATIENT)
Dept: OBGYN CLINIC | Facility: HOSPITAL | Age: 36
End: 2021-09-01

## 2021-09-01 NOTE — TELEPHONE ENCOUNTER
Dr Mini Zhang - do you want to see this patient back for L knee MRSA infection - post hosp consult in 1 week ?

## 2021-09-08 ENCOUNTER — HOSPITAL ENCOUNTER (EMERGENCY)
Facility: HOSPITAL | Age: 36
End: 2021-09-08
Attending: EMERGENCY MEDICINE
Payer: MEDICARE

## 2021-09-08 ENCOUNTER — APPOINTMENT (OUTPATIENT)
Dept: NON INVASIVE DIAGNOSTICS | Facility: HOSPITAL | Age: 36
DRG: 253 | End: 2021-09-08
Payer: MEDICARE

## 2021-09-08 ENCOUNTER — HOSPITAL ENCOUNTER (INPATIENT)
Facility: HOSPITAL | Age: 36
LOS: 8 days | Discharge: HOME HEALTH | DRG: 253 | End: 2021-09-17
Attending: SURGERY | Admitting: FAMILY MEDICINE
Payer: MEDICARE

## 2021-09-08 VITALS
SYSTOLIC BLOOD PRESSURE: 138 MMHG | RESPIRATION RATE: 20 BRPM | DIASTOLIC BLOOD PRESSURE: 80 MMHG | HEART RATE: 106 BPM | WEIGHT: 215 LBS | OXYGEN SATURATION: 100 % | HEIGHT: 61 IN | BODY MASS INDEX: 40.59 KG/M2 | TEMPERATURE: 98.1 F

## 2021-09-08 DIAGNOSIS — I70.209 SUPERFICIAL FEMORAL ARTERY OCCLUSION (HCC): Primary | ICD-10-CM

## 2021-09-08 DIAGNOSIS — E11.42 TYPE 2 DIABETES MELLITUS WITH DIABETIC POLYNEUROPATHY, WITH LONG-TERM CURRENT USE OF INSULIN (HCC): ICD-10-CM

## 2021-09-08 DIAGNOSIS — M79.672 LEFT FOOT PAIN: ICD-10-CM

## 2021-09-08 DIAGNOSIS — M25.562 ACUTE PAIN OF LEFT KNEE: ICD-10-CM

## 2021-09-08 DIAGNOSIS — I70.222 ATHEROSCLEROSIS OF NATIVE ARTERY OF LEFT LEG WITH REST PAIN (HCC): Primary | ICD-10-CM

## 2021-09-08 DIAGNOSIS — Z79.4 TYPE 2 DIABETES MELLITUS WITH DIABETIC POLYNEUROPATHY, WITH LONG-TERM CURRENT USE OF INSULIN (HCC): ICD-10-CM

## 2021-09-08 DIAGNOSIS — K59.00 CONSTIPATION, UNSPECIFIED CONSTIPATION TYPE: ICD-10-CM

## 2021-09-08 DIAGNOSIS — I73.9 PERIPHERAL ARTERY DISEASE (HCC): ICD-10-CM

## 2021-09-08 PROBLEM — M79.605 ACUTE LEG PAIN, LEFT: Status: ACTIVE | Noted: 2021-09-08

## 2021-09-08 LAB
ALBUMIN SERPL BCP-MCNC: 3.5 G/DL (ref 3.4–4.8)
ALP SERPL-CCNC: 76.9 U/L (ref 35–140)
ALT SERPL W P-5'-P-CCNC: 16 U/L (ref 5–54)
ANION GAP SERPL CALCULATED.3IONS-SCNC: 11 MMOL/L (ref 4–13)
APTT PPP: 31 SECONDS (ref 23–31)
APTT PPP: >210 SECONDS (ref 23–37)
AST SERPL W P-5'-P-CCNC: 11 U/L (ref 15–41)
BASOPHILS # BLD AUTO: 0.02 THOUSANDS/ΜL (ref 0–0.1)
BASOPHILS NFR BLD AUTO: 0 % (ref 0–1)
BILIRUB SERPL-MCNC: 0.24 MG/DL (ref 0.3–1.2)
BUN SERPL-MCNC: 18 MG/DL (ref 6–20)
CALCIUM SERPL-MCNC: 8.8 MG/DL (ref 8.4–10.2)
CHLORIDE SERPL-SCNC: 102 MMOL/L (ref 96–108)
CO2 SERPL-SCNC: 27 MMOL/L (ref 22–33)
CREAT SERPL-MCNC: 1.08 MG/DL (ref 0.4–1.1)
EOSINOPHIL # BLD AUTO: 0.02 THOUSAND/ΜL (ref 0–0.61)
EOSINOPHIL NFR BLD AUTO: 0 % (ref 0–6)
ERYTHROCYTE [DISTWIDTH] IN BLOOD BY AUTOMATED COUNT: 12.8 % (ref 11.6–15.1)
EST. AVERAGE GLUCOSE BLD GHB EST-MCNC: 197 MG/DL
GFR SERPL CREATININE-BSD FRML MDRD: 77 ML/MIN/1.73SQ M
GLUCOSE SERPL-MCNC: 162 MG/DL (ref 65–140)
GLUCOSE SERPL-MCNC: 173 MG/DL (ref 65–140)
GLUCOSE SERPL-MCNC: 213 MG/DL (ref 65–140)
GLUCOSE SERPL-MCNC: 296 MG/DL (ref 65–140)
GLUCOSE SERPL-MCNC: 414 MG/DL (ref 65–140)
HBA1C MFR BLD: 8.5 %
HCT VFR BLD AUTO: 30.1 % (ref 34.8–46.1)
HGB BLD-MCNC: 10.9 G/DL (ref 11.5–15.4)
IMM GRANULOCYTES # BLD AUTO: 0.03 THOUSAND/UL (ref 0–0.2)
IMM GRANULOCYTES NFR BLD AUTO: 0 % (ref 0–2)
INR PPP: 1.21 (ref 0.9–1.1)
LACTATE SERPL-SCNC: 2.7 MMOL/L (ref 0–2)
LYMPHOCYTES # BLD AUTO: 1.59 THOUSANDS/ΜL (ref 0.6–4.47)
LYMPHOCYTES NFR BLD AUTO: 14 % (ref 14–44)
MAGNESIUM SERPL-MCNC: 1.5 MG/DL (ref 1.6–2.6)
MCH RBC QN AUTO: 28.4 PG (ref 26.8–34.3)
MCHC RBC AUTO-ENTMCNC: 36.2 G/DL (ref 31.4–37.4)
MCV RBC AUTO: 78 FL (ref 82–98)
MONOCYTES # BLD AUTO: 0.45 THOUSAND/ΜL (ref 0.17–1.22)
MONOCYTES NFR BLD AUTO: 4 % (ref 4–12)
NEUTROPHILS # BLD AUTO: 8.96 THOUSANDS/ΜL (ref 1.85–7.62)
NEUTS SEG NFR BLD AUTO: 82 % (ref 43–75)
PLATELET # BLD AUTO: 374 THOUSANDS/UL (ref 149–390)
PMV BLD AUTO: 10.6 FL (ref 8.9–12.7)
POTASSIUM SERPL-SCNC: 3.1 MMOL/L (ref 3.5–5)
PROT SERPL-MCNC: 6.6 G/DL (ref 6.4–8.3)
PROTHROMBIN TIME: 13.5 SECONDS (ref 9.5–12.1)
RBC # BLD AUTO: 3.84 MILLION/UL (ref 3.81–5.12)
SODIUM SERPL-SCNC: 140 MMOL/L (ref 133–145)
WBC # BLD AUTO: 11.07 THOUSAND/UL (ref 4.31–10.16)

## 2021-09-08 PROCEDURE — 93926 LOWER EXTREMITY STUDY: CPT | Performed by: SURGERY

## 2021-09-08 PROCEDURE — 83036 HEMOGLOBIN GLYCOSYLATED A1C: CPT | Performed by: STUDENT IN AN ORGANIZED HEALTH CARE EDUCATION/TRAINING PROGRAM

## 2021-09-08 PROCEDURE — 82948 REAGENT STRIP/BLOOD GLUCOSE: CPT

## 2021-09-08 PROCEDURE — 80053 COMPREHEN METABOLIC PANEL: CPT | Performed by: STUDENT IN AN ORGANIZED HEALTH CARE EDUCATION/TRAINING PROGRAM

## 2021-09-08 PROCEDURE — NC001 PR NO CHARGE: Performed by: RADIOLOGY

## 2021-09-08 PROCEDURE — 85730 THROMBOPLASTIN TIME PARTIAL: CPT | Performed by: STUDENT IN AN ORGANIZED HEALTH CARE EDUCATION/TRAINING PROGRAM

## 2021-09-08 PROCEDURE — 96365 THER/PROPH/DIAG IV INF INIT: CPT

## 2021-09-08 PROCEDURE — 85610 PROTHROMBIN TIME: CPT | Performed by: STUDENT IN AN ORGANIZED HEALTH CARE EDUCATION/TRAINING PROGRAM

## 2021-09-08 PROCEDURE — 99285 EMERGENCY DEPT VISIT HI MDM: CPT | Performed by: STUDENT IN AN ORGANIZED HEALTH CARE EDUCATION/TRAINING PROGRAM

## 2021-09-08 PROCEDURE — 85730 THROMBOPLASTIN TIME PARTIAL: CPT | Performed by: FAMILY MEDICINE

## 2021-09-08 PROCEDURE — 96372 THER/PROPH/DIAG INJ SC/IM: CPT

## 2021-09-08 PROCEDURE — 96375 TX/PRO/DX INJ NEW DRUG ADDON: CPT

## 2021-09-08 PROCEDURE — 99223 1ST HOSP IP/OBS HIGH 75: CPT | Performed by: SURGERY

## 2021-09-08 PROCEDURE — 93922 UPR/L XTREMITY ART 2 LEVELS: CPT | Performed by: SURGERY

## 2021-09-08 PROCEDURE — 83605 ASSAY OF LACTIC ACID: CPT | Performed by: STUDENT IN AN ORGANIZED HEALTH CARE EDUCATION/TRAINING PROGRAM

## 2021-09-08 PROCEDURE — 36415 COLL VENOUS BLD VENIPUNCTURE: CPT | Performed by: STUDENT IN AN ORGANIZED HEALTH CARE EDUCATION/TRAINING PROGRAM

## 2021-09-08 PROCEDURE — 83735 ASSAY OF MAGNESIUM: CPT | Performed by: STUDENT IN AN ORGANIZED HEALTH CARE EDUCATION/TRAINING PROGRAM

## 2021-09-08 PROCEDURE — 99285 EMERGENCY DEPT VISIT HI MDM: CPT

## 2021-09-08 PROCEDURE — 99226 PR SBSQ OBSERVATION CARE/DAY 35 MINUTES: CPT | Performed by: FAMILY MEDICINE

## 2021-09-08 PROCEDURE — 99284 EMERGENCY DEPT VISIT MOD MDM: CPT

## 2021-09-08 PROCEDURE — 85025 COMPLETE CBC W/AUTO DIFF WBC: CPT | Performed by: STUDENT IN AN ORGANIZED HEALTH CARE EDUCATION/TRAINING PROGRAM

## 2021-09-08 PROCEDURE — 93926 LOWER EXTREMITY STUDY: CPT

## 2021-09-08 RX ORDER — TRAZODONE HYDROCHLORIDE 50 MG/1
200 TABLET ORAL
Status: DISCONTINUED | OUTPATIENT
Start: 2021-09-08 | End: 2021-09-08

## 2021-09-08 RX ORDER — HEPARIN SODIUM 1000 [USP'U]/ML
7600 INJECTION, SOLUTION INTRAVENOUS; SUBCUTANEOUS
Status: DISCONTINUED | OUTPATIENT
Start: 2021-09-08 | End: 2021-09-14 | Stop reason: SDUPTHER

## 2021-09-08 RX ORDER — KETOROLAC TROMETHAMINE 30 MG/ML
15 INJECTION, SOLUTION INTRAMUSCULAR; INTRAVENOUS ONCE
Status: COMPLETED | OUTPATIENT
Start: 2021-09-08 | End: 2021-09-08

## 2021-09-08 RX ORDER — ALBUTEROL SULFATE 2.5 MG/3ML
2.5 SOLUTION RESPIRATORY (INHALATION) EVERY 6 HOURS PRN
Status: DISCONTINUED | OUTPATIENT
Start: 2021-09-08 | End: 2021-09-17 | Stop reason: HOSPADM

## 2021-09-08 RX ORDER — HEPARIN SODIUM 1000 [USP'U]/ML
4000 INJECTION, SOLUTION INTRAVENOUS; SUBCUTANEOUS
Status: DISCONTINUED | OUTPATIENT
Start: 2021-09-08 | End: 2021-09-08 | Stop reason: HOSPADM

## 2021-09-08 RX ORDER — HYDROMORPHONE HCL/PF 1 MG/ML
0.5 SYRINGE (ML) INJECTION
Status: DISCONTINUED | OUTPATIENT
Start: 2021-09-08 | End: 2021-09-08

## 2021-09-08 RX ORDER — HEPARIN SODIUM 1000 [USP'U]/ML
3800 INJECTION, SOLUTION INTRAVENOUS; SUBCUTANEOUS
Status: DISCONTINUED | OUTPATIENT
Start: 2021-09-08 | End: 2021-09-14 | Stop reason: SDUPTHER

## 2021-09-08 RX ORDER — ACETAMINOPHEN 325 MG/1
975 TABLET ORAL EVERY 6 HOURS SCHEDULED
Status: DISCONTINUED | OUTPATIENT
Start: 2021-09-08 | End: 2021-09-17 | Stop reason: HOSPADM

## 2021-09-08 RX ORDER — CLOPIDOGREL BISULFATE 75 MG/1
75 TABLET ORAL DAILY
Status: DISCONTINUED | OUTPATIENT
Start: 2021-09-09 | End: 2021-09-08

## 2021-09-08 RX ORDER — HEPARIN SODIUM 1000 [USP'U]/ML
4000 INJECTION, SOLUTION INTRAVENOUS; SUBCUTANEOUS ONCE
Status: COMPLETED | OUTPATIENT
Start: 2021-09-08 | End: 2021-09-08

## 2021-09-08 RX ORDER — CHLORPROMAZINE HYDROCHLORIDE 25 MG/1
100 TABLET, FILM COATED ORAL
Status: DISCONTINUED | OUTPATIENT
Start: 2021-09-08 | End: 2021-09-17 | Stop reason: HOSPADM

## 2021-09-08 RX ORDER — HEPARIN SODIUM 1000 [USP'U]/ML
2000 INJECTION, SOLUTION INTRAVENOUS; SUBCUTANEOUS
Status: DISCONTINUED | OUTPATIENT
Start: 2021-09-08 | End: 2021-09-08 | Stop reason: HOSPADM

## 2021-09-08 RX ORDER — DIPHENHYDRAMINE HCL 25 MG
25 TABLET ORAL EVERY 6 HOURS PRN
Status: DISCONTINUED | OUTPATIENT
Start: 2021-09-08 | End: 2021-09-17 | Stop reason: HOSPADM

## 2021-09-08 RX ORDER — QUETIAPINE FUMARATE 25 MG/1
100 TABLET, FILM COATED ORAL
Status: DISCONTINUED | OUTPATIENT
Start: 2021-09-08 | End: 2021-09-08

## 2021-09-08 RX ORDER — HYDROMORPHONE HCL/PF 1 MG/ML
1 SYRINGE (ML) INJECTION
Status: DISCONTINUED | OUTPATIENT
Start: 2021-09-08 | End: 2021-09-17

## 2021-09-08 RX ORDER — QUETIAPINE FUMARATE 100 MG/1
300 TABLET, FILM COATED ORAL DAILY
Status: DISCONTINUED | OUTPATIENT
Start: 2021-09-09 | End: 2021-09-08

## 2021-09-08 RX ORDER — HYDROMORPHONE HCL/PF 1 MG/ML
1 SYRINGE (ML) INJECTION ONCE
Status: COMPLETED | OUTPATIENT
Start: 2021-09-08 | End: 2021-09-08

## 2021-09-08 RX ORDER — HEPARIN SODIUM 10000 [USP'U]/100ML
3-30 INJECTION, SOLUTION INTRAVENOUS
Status: DISCONTINUED | OUTPATIENT
Start: 2021-09-08 | End: 2021-09-14

## 2021-09-08 RX ORDER — POTASSIUM CHLORIDE 20 MEQ/1
40 TABLET, EXTENDED RELEASE ORAL ONCE
Status: DISCONTINUED | OUTPATIENT
Start: 2021-09-08 | End: 2021-09-09

## 2021-09-08 RX ORDER — INSULIN GLARGINE 100 [IU]/ML
40 INJECTION, SOLUTION SUBCUTANEOUS
Status: DISCONTINUED | OUTPATIENT
Start: 2021-09-08 | End: 2021-09-13

## 2021-09-08 RX ORDER — OXYCODONE HCL 5 MG/5 ML
10 SOLUTION, ORAL ORAL EVERY 4 HOURS PRN
Status: DISCONTINUED | OUTPATIENT
Start: 2021-09-08 | End: 2021-09-10

## 2021-09-08 RX ORDER — SODIUM CHLORIDE 9 MG/ML
75 INJECTION, SOLUTION INTRAVENOUS CONTINUOUS
Status: DISCONTINUED | OUTPATIENT
Start: 2021-09-08 | End: 2021-09-09

## 2021-09-08 RX ORDER — NICOTINE 21 MG/24HR
1 PATCH, TRANSDERMAL 24 HOURS TRANSDERMAL DAILY
Status: DISCONTINUED | OUTPATIENT
Start: 2021-09-08 | End: 2021-09-08

## 2021-09-08 RX ORDER — OXYCODONE HYDROCHLORIDE 5 MG/1
5 TABLET ORAL EVERY 4 HOURS PRN
Status: DISCONTINUED | OUTPATIENT
Start: 2021-09-08 | End: 2021-09-15

## 2021-09-08 RX ORDER — MAGNESIUM SULFATE HEPTAHYDRATE 40 MG/ML
2 INJECTION, SOLUTION INTRAVENOUS ONCE
Status: COMPLETED | OUTPATIENT
Start: 2021-09-08 | End: 2021-09-08

## 2021-09-08 RX ORDER — ATORVASTATIN CALCIUM 40 MG/1
40 TABLET, FILM COATED ORAL
Status: DISCONTINUED | OUTPATIENT
Start: 2021-09-08 | End: 2021-09-17 | Stop reason: HOSPADM

## 2021-09-08 RX ORDER — CLOPIDOGREL BISULFATE 75 MG/1
75 TABLET ORAL DAILY
Status: DISCONTINUED | OUTPATIENT
Start: 2021-09-08 | End: 2021-09-13

## 2021-09-08 RX ORDER — QUETIAPINE FUMARATE 100 MG/1
300 TABLET, FILM COATED ORAL DAILY
Status: DISCONTINUED | OUTPATIENT
Start: 2021-09-08 | End: 2021-09-08

## 2021-09-08 RX ORDER — MORPHINE SULFATE 4 MG/ML
4 INJECTION, SOLUTION INTRAMUSCULAR; INTRAVENOUS ONCE
Status: COMPLETED | OUTPATIENT
Start: 2021-09-08 | End: 2021-09-08

## 2021-09-08 RX ORDER — POLYETHYLENE GLYCOL 3350 17 G/17G
17 POWDER, FOR SOLUTION ORAL DAILY PRN
Status: DISCONTINUED | OUTPATIENT
Start: 2021-09-08 | End: 2021-09-17 | Stop reason: HOSPADM

## 2021-09-08 RX ORDER — LISINOPRIL 20 MG/1
20 TABLET ORAL DAILY
Status: DISCONTINUED | OUTPATIENT
Start: 2021-09-08 | End: 2021-09-08

## 2021-09-08 RX ORDER — NICOTINE 21 MG/24HR
1 PATCH, TRANSDERMAL 24 HOURS TRANSDERMAL DAILY
Status: DISCONTINUED | OUTPATIENT
Start: 2021-09-08 | End: 2021-09-17 | Stop reason: HOSPADM

## 2021-09-08 RX ORDER — HEPARIN SODIUM 10000 [USP'U]/100ML
3-20 INJECTION, SOLUTION INTRAVENOUS
Status: DISCONTINUED | OUTPATIENT
Start: 2021-09-08 | End: 2021-09-08 | Stop reason: HOSPADM

## 2021-09-08 RX ORDER — POTASSIUM CHLORIDE 20 MEQ/1
40 TABLET, EXTENDED RELEASE ORAL ONCE
Status: COMPLETED | OUTPATIENT
Start: 2021-09-08 | End: 2021-09-08

## 2021-09-08 RX ORDER — LISINOPRIL 20 MG/1
20 TABLET ORAL DAILY
Status: DISCONTINUED | OUTPATIENT
Start: 2021-09-09 | End: 2021-09-09

## 2021-09-08 RX ORDER — HEPARIN SODIUM 1000 [USP'U]/ML
7600 INJECTION, SOLUTION INTRAVENOUS; SUBCUTANEOUS ONCE
Status: COMPLETED | OUTPATIENT
Start: 2021-09-08 | End: 2021-09-08

## 2021-09-08 RX ADMIN — HYDROMORPHONE HYDROCHLORIDE 1 MG: 1 INJECTION, SOLUTION INTRAMUSCULAR; INTRAVENOUS; SUBCUTANEOUS at 20:19

## 2021-09-08 RX ADMIN — HYDROMORPHONE HYDROCHLORIDE 1 MG: 1 INJECTION, SOLUTION INTRAMUSCULAR; INTRAVENOUS; SUBCUTANEOUS at 13:03

## 2021-09-08 RX ADMIN — NICOTINE 1 PATCH: 21 PATCH, EXTENDED RELEASE TRANSDERMAL at 10:02

## 2021-09-08 RX ADMIN — ACETAMINOPHEN 975 MG: 325 TABLET, FILM COATED ORAL at 23:35

## 2021-09-08 RX ADMIN — SODIUM CHLORIDE 75 ML/HR: 0.9 INJECTION, SOLUTION INTRAVENOUS at 10:14

## 2021-09-08 RX ADMIN — INSULIN HUMAN 5 UNITS: 100 INJECTION, SOLUTION PARENTERAL at 05:42

## 2021-09-08 RX ADMIN — OXYCODONE HYDROCHLORIDE 5 MG: 5 TABLET ORAL at 10:02

## 2021-09-08 RX ADMIN — OXYCODONE HYDROCHLORIDE 10 MG: 5 SOLUTION ORAL at 18:36

## 2021-09-08 RX ADMIN — MORPHINE SULFATE 4 MG: 4 INJECTION INTRAVENOUS at 04:47

## 2021-09-08 RX ADMIN — ATORVASTATIN CALCIUM 40 MG: 40 TABLET, FILM COATED ORAL at 16:34

## 2021-09-08 RX ADMIN — HEPARIN SODIUM 7600 UNITS: 1000 INJECTION INTRAVENOUS; SUBCUTANEOUS at 08:00

## 2021-09-08 RX ADMIN — HEPARIN SODIUM 18 UNITS/KG/HR: 10000 INJECTION, SOLUTION INTRAVENOUS at 08:02

## 2021-09-08 RX ADMIN — INSULIN LISPRO 2 UNITS: 100 INJECTION, SOLUTION INTRAVENOUS; SUBCUTANEOUS at 23:50

## 2021-09-08 RX ADMIN — HEPARIN SODIUM 11.1 UNITS/KG/HR: 10000 INJECTION, SOLUTION INTRAVENOUS at 05:27

## 2021-09-08 RX ADMIN — INSULIN LISPRO 4 UNITS: 100 INJECTION, SOLUTION INTRAVENOUS; SUBCUTANEOUS at 10:02

## 2021-09-08 RX ADMIN — HEPARIN SODIUM 15 UNITS/KG/HR: 10000 INJECTION, SOLUTION INTRAVENOUS at 23:45

## 2021-09-08 RX ADMIN — MAGNESIUM SULFATE HEPTAHYDRATE 2 G: 40 INJECTION, SOLUTION INTRAVENOUS at 13:04

## 2021-09-08 RX ADMIN — HYDROMORPHONE HYDROCHLORIDE 1 MG: 1 INJECTION, SOLUTION INTRAMUSCULAR; INTRAVENOUS; SUBCUTANEOUS at 23:36

## 2021-09-08 RX ADMIN — SODIUM CHLORIDE 1000 ML: 0.9 INJECTION, SOLUTION INTRAVENOUS at 05:42

## 2021-09-08 RX ADMIN — HEPARIN SODIUM 4000 UNITS: 1000 INJECTION INTRAVENOUS; SUBCUTANEOUS at 05:26

## 2021-09-08 RX ADMIN — POTASSIUM CHLORIDE 40 MEQ: 1500 TABLET, EXTENDED RELEASE ORAL at 05:42

## 2021-09-08 RX ADMIN — INSULIN GLARGINE 40 UNITS: 100 INJECTION, SOLUTION SUBCUTANEOUS at 23:36

## 2021-09-08 RX ADMIN — KETOROLAC TROMETHAMINE 15 MG: 30 INJECTION, SOLUTION INTRAMUSCULAR; INTRAVENOUS at 05:11

## 2021-09-08 RX ADMIN — ACETAMINOPHEN 975 MG: 325 TABLET, FILM COATED ORAL at 18:36

## 2021-09-08 RX ADMIN — HYDROMORPHONE HYDROCHLORIDE 0.5 MG: 1 INJECTION, SOLUTION INTRAMUSCULAR; INTRAVENOUS; SUBCUTANEOUS at 11:31

## 2021-09-08 NOTE — EMTALA/ACUTE CARE TRANSFER
[facility] EMERGENCY DEPARTMENT  [address] [facility] [address]  Dept: [phone]      NWQRZU TRANSFER CONSENT    NAME Maria Luz WALSH 1985                              MRN [id number]    I have been informed of my rights regarding examination, treatment, and transfer   by Dr Rashida Goodson MD    Benefits: Specialized equipment and/or services available at the receiving facility (Include comment)________________________ (Vascular surgery)    Risks: Potential for delay in receiving treatment, Potential deterioration of medical condition, Loss of IV, Increased discomfort during transfer, Possible worsening of condition or death during transfer      Consent for Transfer:  I acknowledge that my medical condition has been evaluated and explained to me by the emergency department physician or other qualified medical person and/or my attending physician, who has recommended that I be transferred to the service of  Accepting Physician: Kelsey Yi at [address] Name, [address] : [facility]  The above potential benefits of such transfer, the potential risks associated with such transfer, and the probable risks of not being transferred have been explained to me, and I fully understand them  The doctor has explained that, in my case, the benefits of transfer outweigh the risks  I agree to be transferred  I authorize the performance of emergency medical procedures and treatments upon me in both transit and upon arrival at the receiving facility  Additionally, I authorize the release of any and all medical records to the receiving facility and request they be transported with me, if possible  I understand that the safest mode of transportation during a medical emergency is an ambulance and that the Hospital advocates the use of this mode of transport   Risks of traveling to the receiving facility by car, including absence of medical control, life sustaining equipment, such as oxygen, and medical personnel has been explained to me and I fully understand them  (MARIFER CORRECT BOX BELOW)  [  ]  I consent to the stated transfer and to be transported by ambulance/helicopter  [  ]  I consent to the stated transfer, but refuse transportation by ambulance and accept full responsibility for my transportation by car  I understand the risks of non-ambulance transfers and I exonerate the Hospital and its staff from any deterioration in my condition that results from this refusal     X___________________________________________    DATE  21  TIME________  Signature of patient or legally responsible individual signing on patient behalf           RELATIONSHIP TO PATIENT_________________________          Provider Certification    NAME Juancarlos Guerra                                         1985                              MRN [id number]    A medical screening exam was performed on the above named patient  Based on the examination:    Condition Necessitating Transfer The encounter diagnosis was Atherosclerosis of native artery of left leg with rest pain ([address] )      Patient Condition: The patient has been stabilized such that within reasonable medical probability, no material deterioration of the patient condition or the condition of the unborn child(rosalie) is likely to result from the transfer    Reason for Transfer: Level of Care needed not available at this facility    Transfer Requirements: Facility [facility]   · Space available and qualified personnel available for treatment as acknowledged by    · Agreed to accept transfer and to provide appropriate medical treatment as acknowledged by       Rebecca Ramirez  · Appropriate medical records of the examination and treatment of the patient are provided at the time of transfer   [address] _______  · Transfer will be performed by qualified personnel from    and appropriate transfer equipment as required, including the use of necessary and appropriate life support measures  Provider Certification: I have examined the patient and explained the following risks and benefits of being transferred/refusing transfer to the patient/family:  General risk, such as traffic hazards, adverse weather conditions, rough terrain or turbulence, possible failure of equipment (including vehicle or aircraft), or consequences of actions of persons outside the control of the transport personnel, Unanticipated needs of medical equipment and personnel during transport, Risk of worsening condition, The possibility of a transport vehicle being unavailable      Based on these reasonable risks and benefits to the patient and/or the unborn child(rosalie), and based upon the information available at the time of the patients examination, I certify that the medical benefits reasonably to be expected from the provision of appropriate medical treatments at another medical facility outweigh the increasing risks, if any, to the individuals medical condition, and in the case of labor to the unborn child, from effecting the transfer      X____________________________________________ DATE 09/08/21        TIME_______      ORIGINAL - SEND TO MEDICAL RECORDS   COPY - SEND WITH PATIENT DURING TRANSFER

## 2021-09-08 NOTE — ASSESSMENT & PLAN NOTE
History of  Left leg atherosclerosis status post left SFA stent on 02/55, complicated by subsequent stent thrombosis and thrombectomy on 08/25 ,  Presenting to ED with severe left leg pain and numbness concerning for left SFA stent reocclusion most likely in the setting of  Plavix noncompliance  Pt was on PTA Xarelto and plavix  Vascular surgery  and IR following, appreciate recommendations  Continue heparin drip, may need fem-pop bypass next week  Pain management- Tylenol scheduled, Oxy 5 p r n  for moderate pain, Oxy 10 p r n  for severe pain, Dilaudid 1 mg IV q3 p r n   breakthrough pain  LA 2 7, most likely in the setting of L SFA stent occlusion

## 2021-09-08 NOTE — ASSESSMENT & PLAN NOTE
mood currently stable   patient however with lethargy   PTA medications Thorazine 100 mg q h s , Latuda 80 mg, trazodone 200 mg,  Seroquel 300 mg daily    held PTA medications in the setting of lethargy and will continue to monitor mentation very closely  Resumed Thorazine, Latuda

## 2021-09-08 NOTE — ED NOTES
Pt yelling loudly, able to be heard throughout the department  Call bell rang and answered within 30 seconds by RN  Upon RN arrival to room, pt's eyes were closed and RN stated "how can I help you?" Pt stated to RN "I've been ringing and ringing this bell and no one is answering  I'm in pain, that morphine didn't help, when it doesn't help after 20 minutes you're supposed to give me something else " RN again explained rationale of why the pain is being experienced and provider will be informed of pain complaint        Trino Mcleod, JEN  09/08/21 8177

## 2021-09-08 NOTE — CONSULTS
Consultation - Vascular Surgery   Jeanine Storm 30 y o  female MRN: [id number]  Unit/Bed#: ED 22 Encounter: [id number]      Assessment/Plan      Assessment:  35F with DM, COPD, BPD, PAD w/L SFA PTA/stent 8/11/2021, subsequent thrombectomy 8/25/2021, who p/w acute L SFA stent occlusion  Plan:  - to IR today for repeat thrombectomy  - patient needs to be compliant with her xarelto AND plavix, stressed this to patient today  - continue hep gtt now at VTE dose until ready to transition back to oral  - PRN analgesia  - NPO until after IR  - will ask family medicine to admit, appreciate input      History of Present Illness   Physician Requesting Consult: Yrn Horowitz MD  Reason for Consult / Principal Problem: L SFA stent occlusion    HPI: Jeanine Storm is a 30y o  year old female who presents with numbness of her left foot and pain of her left leg below the knee  She states her symptoms started yesterday morning and she waited until going to the ER until yesterday evening as she thought it would resolve on its own  She has a history of PAD and L SFA stent/PTA 8/11/2021, subsequent thrombectomy 8/25/2021 for early stent occlusion  She was discharged to home with instructions to take xarelto and plavix  She takes xarelto "most of the time" but does not report ever taking plavix since being discharged  Consults    Review of Systems   Constitutional: Negative for chills and fever  HENT: Negative for ear pain and sore throat  Eyes: Negative for pain and visual disturbance  Respiratory: Negative for cough and shortness of breath  Cardiovascular: Negative for chest pain and palpitations  Gastrointestinal: Negative for abdominal pain and vomiting  Genitourinary: Negative for dysuria and hematuria  Musculoskeletal: Negative for arthralgias and back pain  Numbness/pain LLE   Skin: Negative for color change and rash  Neurological: Negative for seizures and syncope     All other systems reviewed and are negative  Historical Information   Past Medical History:   Diagnosis Date    Asthma     Bipolar 1 disorder ([facility] [address] )     [facility]     Depression     Diabetes mellitus (HCC)     Hypertension     Psychiatric disorder     cutting history    PTSD (post-traumatic stress disorder)     Tendonitis      Past Surgical History:   Procedure Laterality Date     SECTION      EAR SURGERY      IR LOWER EXTREMITY ANGIOGRAM  2021    IR LOWER EXTREMITY ANGIOGRAM  2021     Social History   Social History     Substance and Sexual Activity   Alcohol Use Not Currently     Social History     Substance and Sexual Activity   Drug Use Not Currently    Types: Cocaine, Marijuana    Comment: 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     E-Cigarette/Vaping    E-Cigarette Use Never User      E-Cigarette/Vaping Substances    Nicotine No     THC No     CBD No     Flavoring No     Other No     Unknown No      Social History     Tobacco Use   Smoking Status Current Every Day Smoker    Packs/day: 2 00    Years: 17 00    Pack years: 34 00    Types: Cigarettes   Smokeless Tobacco Never Used   Tobacco Comment    pt is down to 4 cigarettes a day      Family History: non-contributory}    Meds/Allergies   all current active meds have been reviewed  No Known Allergies    Objective   Vitals: Blood pressure 141/67, pulse 95, temperature 97 9 °F (36 6 °C), temperature source Oral, resp  rate 18, last menstrual period 2021, SpO2 98 %, not currently breastfeeding  ,There is no height or weight on file to calculate BMI  No intake or output data in the 24 hours ending 21 0802  Invasive Devices     Peripheral Intravenous Line            Peripheral IV 21 Right Hand <1 day                Physical Exam  Vitals and nursing note reviewed  Constitutional:       General: She is not in acute distress  Appearance: She is well-developed     HENT: Head: Normocephalic and atraumatic  Eyes:      Conjunctiva/sclera: Conjunctivae normal    Cardiovascular:      Rate and Rhythm: Normal rate and regular rhythm  Heart sounds: No murmur heard  Comments: R palp fem/DP/PT  L palp fem, absent pop/DP/PT    Decreased sensation over webspaces/toes  Decreased motor in toes  Pulmonary:      Effort: Pulmonary effort is normal  No respiratory distress  Breath sounds: Normal breath sounds  Abdominal:      Palpations: Abdomen is soft  Tenderness: There is no abdominal tenderness  Musculoskeletal:      Cervical back: Neck supple  Skin:     General: Skin is warm and dry  Capillary Refill: Capillary refill takes 2 to 3 seconds  Neurological:      Mental Status: She is alert and oriented to person, place, and time  Mental status is at baseline  Psychiatric:         Mood and Affect: Mood normal          Lab Results:   Coags:   Lab Results   Component Value Date    PTT 31 09/08/2021    INR 1 21 (H) 09/08/2021   , CBC with diff:   Lab Results   Component Value Date    WBC 11 07 (H) 09/08/2021    HGB 10 9 (L) 09/08/2021    HCT 30 1 (L) 09/08/2021    MCV 78 (L) 09/08/2021     09/08/2021    MCH 28 4 09/08/2021    MCHC 36 2 09/08/2021    RDW 12 8 09/08/2021    MPV 10 6 09/08/2021   , BMP/CMP:   Lab Results   Component Value Date    SODIUM 140 09/08/2021    K 3 1 (L) 09/08/2021     09/08/2021    CO2 27 09/08/2021    BUN 18 09/08/2021    CREATININE 1 08 09/08/2021    CALCIUM 8 8 09/08/2021    AST 11 (L) 09/08/2021    ALT 16 09/08/2021    ALKPHOS 76 9 09/08/2021    EGFR 77 09/08/2021     Imaging Studies: I have personally reviewed pertinent reports  EKG, Pathology, and Other Studies: I have personally reviewed pertinent reports      VTE Prophylaxis: Sequential compression device (Venodyne)  and Heparin     Code Status: Prior  Advance Directive and Living Will:      Power of :    POLST:      Counseling / Coordination of Care  None

## 2021-09-08 NOTE — ASSESSMENT & PLAN NOTE
unclear history of COPD   patient currently only on albuterol neb p r n  no other active inhalers noted   will continue to monitor

## 2021-09-08 NOTE — PLAN OF CARE
Problem: Nutrition/Hydration-ADULT  Goal: Nutrient/Hydration intake appropriate for improving, restoring or maintaining nutritional needs  Description: Monitor and assess patient's nutrition/hydration status for malnutrition  Collaborate with interdisciplinary team and initiate plan and interventions as ordered  Monitor patient's weight and dietary intake as ordered or per policy  Utilize nutrition screening tool and intervene as necessary  Determine patient's food preferences and provide high-protein, high-caloric foods as appropriate       INTERVENTIONS:  - Monitor oral intake, urinary output, labs, and treatment plans  - Assess nutrition and hydration status and recommend course of action  - Evaluate amount of meals eaten  - Assist patient with eating if necessary   - Allow adequate time for meals  - Recommend/ encourage appropriate diets, oral nutritional supplements, and vitamin/mineral supplements  - Order, calculate, and assess calorie counts as needed  - Recommend, monitor, and adjust tube feedings and TPN/PPN based on assessed needs  - Assess need for intravenous fluids  - Provide specific nutrition/hydration education as appropriate  - Include patient/family/caregiver in decisions related to nutrition  Outcome: Progressing     Problem: Prexisting or High Potential for Compromised Skin Integrity  Goal: Skin integrity is maintained or improved  Description: INTERVENTIONS:  - Identify patients at risk for skin breakdown  - Assess and monitor skin integrity  - Assess and monitor nutrition and hydration status  - Monitor labs   - Assess for incontinence   - Turn and reposition patient  - Assist with mobility/ambulation  - Relieve pressure over bony prominences  - Avoid friction and shearing  - Provide appropriate hygiene as needed including keeping skin clean and dry  - Evaluate need for skin moisturizer/barrier cream  - Collaborate with interdisciplinary team   - Patient/family teaching  - Consider wound care consult   Outcome: Progressing     Problem: Potential for Falls  Goal: Patient will remain free of falls  Description: INTERVENTIONS:  - Educate patient/family on patient safety including physical limitations  - Instruct patient to call for assistance with activity   - Consult OT/PT to assist with strengthening/mobility   - Keep Call bell within reach  - Keep bed low and locked with side rails adjusted as appropriate  - Keep care items and personal belongings within reach  - Initiate and maintain comfort rounds  - Make Fall Risk Sign visible to staff  - - Apply yellow socks and bracelet for high fall risk patients  - Consider moving patient to room near nurses station  Outcome: Progressing

## 2021-09-08 NOTE — H&P
H&P - Family Medicine Residency, Teri Houston 1985, 30 y o  female  MRN: [id number]    Unit/Bed#: ED 22 Encounter: [id number]  Primary Care Provider: Lorraine Hills DO      Admission Date: 9/8/2021 0171    Assessments & Plans:   Plans discussed with [facility] team and finalization is pending attending physician  attestation      * Acute leg pain, left  Assessment & Plan  History of  Left leg atherosclerosis status post left SFA stent on 98/85, complicated by subsequent stent thrombosis and thrombectomy on 08/25 ,  Presenting today with severe left leg pain and numbness concerning for left SFA stent reocclusion most likely  In the setting of  Plavix noncompliance  Pt was on PTA Xarelto and plavix   vascular surgery  And IR following appreciate recommendations   planning for possible IR  Lower extremity angiogram today    NPO   continue heparin drip    Pain management- Tylenol scheduled, Oxy 5 p r n  for moderate pain, Oxy 10 p r n  for severe pain, Dilaudid 0 5 q3 p r n   breakthrough pain  LA 2 7, most likely in the setting of L SFA stent occlusion, added IVFluids @75 ml/hr     Peripheral artery disease ([facility] [address] )  Assessment & Plan   History of left leg atherosclerosis status post left SFA stent on 82/25/5094, complicated by thrombosis, thrombectomy on 08/25, presenting today with possible reocclusion of the L  SFA stent in the setting of most likely medication noncompliance   please refer to left leg pain for the plan         Bipolar disorder [facility])  Assessment & Plan   mood currently stable   patient however with lethargy   PTA medications Thorazine 100 mg q h s , Latuda 80 mg, trazodone 200 mg,  Seroquel 300 mg daily    held PTA medications in the setting of lethargy and will continue to monitor mentation very closely   patient reports taking a dose of Seroquel today in the morning   consider  Re-evaluating and restarting PTA medications  After the procedure today or tomorrow    Nicotine dependence  Assessment & Plan   continue nicotine replacement   tobacco cessation counseling    COPD (chronic obstructive pulmonary disease) ([facility])  Assessment & Plan   unclear history of COPD   patient currently only on albuterol neb p r n  no other active inhalers noted   will continue to monitor    Type 2 diabetes mellitus with diabetic polyneuropathy, with long-term current use of insulin ([facility])  Assessment & Plan  Lab Results   Component Value Date    HGBA1C 9 9 (H) 08/24/2021       Recent Labs     09/08/21  0702   POCGLU 296*       Blood Sugar Average: Last 72 hrs:  (P) 296        appears uncontrolled as outpatient   PTA Lantus 40 units q h s    continue Lantus at 40 units q h s    added sliding scale coverage algorithm 3   consider adding to the regimen based on blood sugars    Hypertension  Assessment & Plan   currently stable   patient admits taking lisinopril today in the morning   PTA lisinopril 20 mg, prazosin 1 mg q h s    will consider  Restarting prazosin based upon blood pressure level      Patient Active Problem List   Diagnosis    Encounter for gynecological examination without abnormal finding    Possible exposure to STD    Headache    Hypertension    Type 2 diabetes mellitus with diabetic polyneuropathy, with long-term current use of insulin ([facility] [address] )    Hypertensive urgency    Paresthesia    COPD (chronic obstructive pulmonary disease) ([facility])    Nicotine dependence    Bipolar disorder ([facility] [address] )    Syncope    Cellulitis    Toe infection    Toe pain, left    Deliberate self-cutting    Left foot pain    COVID-19    Diarrhea    Peripheral artery disease ([facility] [address] )    Body mass index (BMI)40 0-44 9, adult ([facility])    Bursitis of left knee    Superficial femoral artery occlusion ([facility])    Acute pain of left knee    Acute leg pain, left       Diet: Diet NPO; Sips with meds  VTE Pharm PPX: Reason for no pharmacologic prophylaxis on IV heparin   VTE [facility] PPX: sequential compression device    Code Status:  Level 1 - Full Code      Disposition: Admit to Observation under Med-surg  Consult: INPATIENT CONSULT TO IR    Chief Complaints:   Numbness (pt transfer from [facility], c/o left leg numbess/tingling starting yesterday  pt here for consult to vascular )      History of Presenting Illness:    72-year-old female with past medical history of bipolar disorder, type 2 diabetes, hypertension, atherosclerosis of left leg with status post left SFA stent on 78/84, complicated by stent thrombosis, had thrombectomy on 08/25 presented today for left leg pain and numbness  Patient was on Xarelto and Plavix after the stent placement  Patient reports she started noticing the  Severe left leg pain yesterday around 6:00 a m  in the morning associated with numbness and  Tingling  Patient denies other symptoms including headaches, lightheadedness, shortness of breath, chest pain, fevers, abdominal pain, changes in bladder or bowel habits  Patient reports not taking Plavix stating that she does not have it at home      ED Management:     ED Triage Vitals   Temperature Pulse Respirations Blood Pressure SpO2   09/08/21 0708 09/08/21 0657 09/08/21 0657 09/08/21 0657 09/08/21 0657   97 9 °F (36 6 °C) 95 18 141/67 98 %      Temp Source Heart Rate Source Patient Position - Orthostatic VS BP Location FiO2 (%)   09/08/21 0708 09/08/21 0657 -- -- --   Oral Monitor         Pain Score       09/08/21 1002       8         Medications   heparin (porcine) 25,000 units in 0 45% NaCl 250 mL infusion (premix) (18 Units/kg/hr × 95 kg (Order-Specific) Intravenous New Bag 9/8/21 0802)   heparin (porcine) injection 7,600 Units (has no administration in time range)   heparin (porcine) injection 3,800 Units (has no administration in time range)   albuterol inhalation solution 2 5 mg (has no administration in time range)   atorvastatin (LIPITOR) tablet 40 mg (has no administration in time range)   insulin glargine (LANTUS) subcutaneous injection 40 Units 0 4 mL (has no administration in time range)   insulin lispro (HumaLOG) 100 units/mL subcutaneous injection 1-6 Units (4 Units Subcutaneous Given 9/8/21 1002)   nicotine (NICODERM CQ) 21 mg/24 hr TD 24 hr patch 1 patch (1 patch Transdermal Medication Applied 9/8/21 1002)   lisinopril (ZESTRIL) tablet 20 mg (has no administration in time range)   polyethylene glycol (MIRALAX) packet 17 g (has no administration in time range)   oxyCODONE (ROXICODONE) IR tablet 5 mg (5 mg Oral Given 9/8/21 1002)   oxyCODONE (ROXICODONE) oral solution 10 mg (has no administration in time range)   sodium chloride 0 9 % infusion (has no administration in time range)   acetaminophen (TYLENOL) tablet 975 mg (has no administration in time range)   HYDROmorphone (DILAUDID) injection 0 5 mg (has no administration in time range)   potassium chloride (K-DUR,KLOR-CON) CR tablet 40 mEq (has no administration in time range)   magnesium sulfate 2 g/50 mL IVPB (premix) 2 g (has no administration in time range)   heparin (porcine) injection 7,600 Units (7,600 Units Intravenous Given 9/8/21 0800)       Review of System:   Review of Systems   Constitutional: Negative for chills and fever  HENT: Negative for ear pain and sore throat  Eyes: Negative for pain and visual disturbance  Respiratory: Negative for cough and shortness of breath  Cardiovascular: Negative for chest pain and palpitations  Gastrointestinal: Negative for abdominal pain and vomiting  Genitourinary: Negative for dysuria and hematuria  Musculoskeletal: Negative for arthralgias and back pain  Left leg pain, numbness and tingling    Skin: Negative for color change and rash  Neurological: Negative for seizures, syncope, facial asymmetry and headaches  All other systems reviewed and are negative        History:     Past Medical History:   Diagnosis Date    Asthma     B[facility] 1 [facility]     COPD (chronic obstructive pulmonary disease) [facility] )     Depression     Diabetes mellitus ([facility] [address] )     Hypertension     Psychiatric disorder     cutting history    PTSD (post-traumatic stress disorder)     Tendonitis      Past Surgical History:   Procedure Laterality Date     SECTION      EAR SURGERY      IR LOWER EXTREMITY ANGIOGRAM  2021    IR LOWER EXTREMITY ANGIOGRAM  2021     Social History     Socioeconomic History    Marital status: /Civil Union     Spouse name: None    Number of children: None    Years of education: None    Highest education level: None   Occupational History    None   Tobacco Use    Smoking status: Current Every Day Smoker     Packs/day: 2 00     Years: 17 00     Pack years: 34 00     Types: Cigarettes    Smokeless tobacco: Never Used    Tobacco comment: pt is down to 4 cigarettes a day    Vaping Use    Vaping Use: Never used   Substance and Sexual Activity    Alcohol use: Not Currently    Drug use: Not Currently     Types: Cocaine, Marijuana     Comment: 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    Sexual activity: Yes     Partners: Female, Male     Birth control/protection: None, Condom   Other Topics Concern    None   Social History Narrative    None     Social Determinants of Health     Financial Resource Strain:     Difficulty of Paying Living Expenses:    Food Insecurity:     Worried About Running Out of Food in the Last Year:     Ran Out of Food in the Last Year:    Transportation Needs:     Lack of Transportation (Medical):      Lack of Transportation (Non-Medical):    Physical Activity:     Days of Exercise per Week:     Minutes of Exercise per Session:    Stress:     Feeling of Stress :    Social Connections:     Frequency of Communication with Friends and Family:     Frequency of Social Gatherings with Friends and Family:     Attends [employer] Services:     Active Member of Clubs or Organizations:     Attends Club or Organization Meetings:     Marital Status:    Intimate Partner Violence:     Fear of Current or Ex-Partner:     Emotionally Abused:     Physically Abused:     Sexually Abused:      Family History   Problem Relation Age of Onset    Hypertension Mother     Diabetes Mother     HIV Mother     Heart disease Mother     No Known Problems Father        Medications & Allergies:   all medications and allergies reviewed  No Known Allergies    PTA Medications:  Current Facility-Administered Medications on File Prior to Encounter   Medication Dose Route Frequency Provider Last Rate Last Admin    [COMPLETED] heparin (porcine) injection 4,000 Units  4,000 Units Intravenous Once Philemon , PA-C   4,000 Units at 09/08/21 0526    [COMPLETED] insulin regular (HumuLIN R,NovoLIN R) injection 5 Units  5 Units Subcutaneous Once Philemon , PA-C   5 Units at 09/08/21 0542    [COMPLETED] ketorolac (TORADOL) injection 15 mg  15 mg Intravenous Once Philemon , PA-C   15 mg at 09/08/21 0511    lidocaine (LMX) 4 % cream   Topical Daily PRN Timothy Lakhani MD        [COMPLETED] morphine (PF) 4 mg/mL injection 4 mg  4 mg Intravenous Once Philemon , PA-C   4 mg at 09/08/21 0447    [COMPLETED] potassium chloride (K-DUR,KLOR-CON) CR tablet 40 mEq  40 mEq Oral Once Philemon , PA-C   40 mEq at 09/08/21 0542    [COMPLETED] sodium chloride 0 9 % bolus 1,000 mL  1,000 mL Intravenous Once Philemon , PA-C 1,000 mL/hr at 09/08/21 0542 1,000 mL at 09/08/21 0542    [DISCONTINUED] heparin (ACS LOW)   Intravenous Once Philemon , PA-C        [DISCONTINUED] heparin (porcine) 25,000 units in 0 45% NaCl 250 mL infusion (premix)  3-20 Units/kg/hr (Order-Specific) Intravenous Titrated Philemon , PA-C 10 mL/hr at 09/08/21 0527 11 1 Units/kg/hr at 09/08/21 0527    [DISCONTINUED] heparin (porcine) injection 2,000 Units  2,000 Units Intravenous Q1H PRN Philemon , PA-C        [DISCONTINUED] heparin (porcine) injection 4,000 Units  4,000 Units Intravenous Q1H PRN Artis Rust Matthias Marin PA-C         Current Outpatient Medications on File Prior to Encounter   Medication Sig Dispense Refill    acetaminophen (TYLENOL) 325 mg tablet Take 2 tablets (650 mg total) by mouth every 6 (six) hours as needed for mild pain  0    albuterol (2 5 mg/3 mL) 0 083 % nebulizer solution TAKE 3 ML VIA NEBULIZER EVERY 6 (SIX) HOURS AS NEEDED FOR WHEEZING OR SHORTNESS OF BREATH 150 mL 5    BD Pen Needle Micro U/F 32G X 6 MM MISC use 1 NEEDLE to inject MEDICATION subcutaneously twice a day 100 each 0    Blood Glucose Monitoring Suppl (True Metrix Air Glucose Meter) w/Device KIT use as directed 1 kit 0    chlorproMAZINE (THORAZINE) 100 mg tablet Take 100 mg by mouth daily at bedtime      clopidogrel (PLAVIX) 75 mg tablet Take 1 tablet (75 mg total) by mouth daily 30 tablet 2    Dulaglutide (Trulicity) 1 5 ZF/0 8TF SOPN Tony Cacereson 2 mL 0    glucose blood test strip Use one strip 3 times daily for blood glucose testing  Patient has True Metrix glucometer 100 each 0    glucose monitoring kit (FREESTYLE) monitoring kit Use 1 each daily Use one each daily to check blood sugars   Please refill with freestyle ed or with whichever brand is covered by insurance 1 each 0    hydrOXYzine HCL (ATARAX) 50 mg tablet Take 50 mg by mouth 3 (three) times a day      insulin glargine (Lantus SoloStar) 100 units/mL injection pen Inject 40 Units under the skin daily 18 mL 0    lisinopril (ZESTRIL) 10 mg tablet TAKE TWO (2) TABLETS BY MOUTH DAILY 180 tablet 0    lurasidone (LATUDA) 80 mg tablet Take 1 tablet (80 mg total) by mouth daily with breakfast 30 tablet 0    metFORMIN (GLUCOPHAGE) 1000 MG tablet TAKE ONE (1) TABLET BY MOUTH TWICE DAILY WITH FOOD 60 tablet 0    prazosin (MINIPRESS) 1 mg capsule Take 1 mg by mouth daily at bedtime      QUEtiapine (SEROquel) 300 mg tablet take 1 tablet by mouth once daily 30 tablet 0    rivaroxaban (XARELTO) 15 mg tablet Take 1 tablet (15 mg total) by mouth 2 (two) times a day with meals 60 tablet 0    atorvastatin (LIPITOR) 40 mg tablet  (Patient not taking: Reported on 9/8/2021)      Blood Pressure Monitoring (Blood Pressure Monitor Automat) KATLYN Use Daily as Directed 1 Device 0    lamoTRIgine (LaMICtal) 25 mg tablet TAKE ONE (1) TABLET BY MOUTH DAILY (Patient not taking: Reported on 9/8/2021) 30 tablet 0    nicotine (NICODERM CQ) 21 mg/24 hr TD 24 hr patch Place 1 patch on the skin daily (Patient not taking: Reported on 9/8/2021) 7 patch 0    traZODone (DESYREL) 100 mg tablet TAKE 2 TABLETS BY MOUTH DAILY AT BEDTIME (Patient not taking: Reported on 9/8/2021) 60 tablet 0    [DISCONTINUED] Diclofenac Sodium (VOLTAREN) 1 % APPLY 2 G TOPICALLY 4 (FOUR) TIMES A  g 1         Objective & Vitals:     Vitals: /67   Pulse 95   Temp 97 9 °F (36 6 °C) (Oral)   Resp 18   LMP 08/22/2021   SpO2 98%     No intake or output data in the 24 hours ending 09/08/21 1007    Invasive Devices     Peripheral Intravenous Line            Peripheral IV 09/08/21 Right Hand <1 day                  Labs: I have personally reviewed pertinent reports      Recent Results (from the past 24 hour(s))   Comprehensive metabolic panel    Collection Time: 09/08/21  4:47 AM   Result Value Ref Range    Sodium 140 133 - 145 mmol/L    Potassium 3 1 (L) 3 5 - 5 0 mmol/L    Chloride 102 96 - 108 mmol/L    CO2 27 22 - 33 mmol/L    ANION GAP 11 4 - 13 mmol/L    BUN 18 6 - 20 mg/dL    Creatinine 1 08 0 40 - 1 10 mg/dL    Glucose 414 (H) 65 - 140 mg/dL    Calcium 8 8 8 4 - 10 2 mg/dL    AST 11 (L) 15 - 41 U/L    ALT 16 5 - 54 U/L    Alkaline Phosphatase 76 9 35 - 140 U/L    Total Protein 6 6 6 4 - 8 3 g/dL    Albumin 3 5 3 4 - 4 8 g/dL    Total Bilirubin 0 24 (L) 0 30 - 1 20 mg/dL    eGFR 77 ml/min/1 73sq m   Magnesium    Collection Time: 09/08/21  4:47 AM   Result Value Ref Range    Magnesium 1 5 (L) 1 6 - 2 6 mg/dL   Lactic acid    Collection Time: 09/08/21  4:47 AM   Result Value Ref Range    LACTIC ACID 2 7 (HH) 0 0 - 2 0 mmol/L   CBC and differential    Collection Time: 09/08/21  4:48 AM   Result Value Ref Range    WBC 11 07 (H) 4 31 - 10 16 Thousand/uL    RBC 3 84 3 81 - 5 12 Million/uL    Hemoglobin 10 9 (L) 11 5 - 15 4 g/dL    Hematocrit 30 1 (L) 34 8 - 46 1 %    MCV 78 (L) 82 - 98 fL    MCH 28 4 26 8 - 34 3 pg    MCHC 36 2 31 4 - 37 4 g/dL    RDW 12 8 11 6 - 15 1 %    MPV 10 6 8 9 - 12 7 fL    Platelets 833 016 - 262 Thousands/uL    Neutrophils Relative 82 (H) 43 - 75 %    Immat GRANS % 0 0 - 2 %    Lymphocytes Relative 14 14 - 44 %    Monocytes Relative 4 4 - 12 %    Eosinophils Relative 0 0 - 6 %    Basophils Relative 0 0 - 1 %    Neutrophils Absolute 8 96 (H) 1 85 - 7 62 Thousands/µL    Immature Grans Absolute 0 03 0 00 - 0 20 Thousand/uL    Lymphocytes Absolute 1 59 0 60 - 4 47 Thousands/µL    Monocytes Absolute 0 45 0 17 - 1 22 Thousand/µL    Eosinophils Absolute 0 02 0 00 - 0 61 Thousand/µL    Basophils Absolute 0 02 0 00 - 0 10 Thousands/µL   Protime-INR    Collection Time: 09/08/21  4:48 AM   Result Value Ref Range    Protime 13 5 (H) 9 5 - 12 1 seconds    INR 1 21 (H) 0 90 - 1 10   APTT    Collection Time: 09/08/21  4:48 AM   Result Value Ref Range    PTT 31 23 - 31 seconds   Fingerstick Glucose (POCT)    Collection Time: 09/08/21  7:02 AM   Result Value Ref Range    POC Glucose 296 (H) 65 - 140 mg/dl       Imaging:     I have personally reviewed pertinent reports  No results found  EKG, Pathology, and Other Studies:   I have personally reviewed pertinent reports  Physical Exam:   Physical Exam  Vitals reviewed  Constitutional:       Comments: lethargic   HENT:      Head: Normocephalic and atraumatic  Mouth/Throat:      Mouth: Mucous membranes are moist    Eyes:      Conjunctiva/sclera: Conjunctivae normal    Cardiovascular:      Rate and Rhythm: Normal rate and regular rhythm  Pulses:           Dorsalis pedis pulses are 1+ on the right side and 0 on the left side  Posterior tibial pulses are 0 on the left side  Heart sounds: Normal heart sounds  Pulmonary:      Effort: Pulmonary effort is normal  No respiratory distress  Breath sounds: Normal breath sounds  No wheezing  Abdominal:      General: Abdomen is flat  Bowel sounds are normal    Musculoskeletal:      Cervical back: Normal range of motion  Right lower leg: No edema  Left lower leg: No edema  Comments: Decreased sensation left foot     Skin:     Capillary Refill: Capillary refill takes 2 to 3 seconds  Neurological:      General: No focal deficit present  Mental Status: She is oriented to person, place, and time  Comments: lethargic           Stevan Beard MD  PGY-2, Family Medicine  09/08/21  10:07 AM    Dear reader, please be aware that portions of my note contain dictated text  I have done my best to proof-read this note prior to signing  However, there may be occasional unnoticed errors pertaining to "sound-alike" words and/or grammar during my dictation process  If there is any words or information that is unclear or appears erroneous, please kindly let me know and I will clarify and/or addend my notes accordingly  Thank you for your understanding

## 2021-09-08 NOTE — ED PROVIDER NOTES
History  Chief Complaint   Patient presents with    Leg Pain     Left Leg Pain, surgery 6 weeks ago     Juan Springer is a 30year old female with a PMHx of bipolar disorder, COPD, T2DM, HTN, atherosclerosis of left leg s/p left superior femoral artery stent placement 8/11/21 on xarelto and plavix, who presents to the emergency department for evaluation of 10/10, constant, diffuse left lower leg pain from knee to toes that began yesterday morning around 8AM with associated numbness/tingling, difficulty ambulating, with toes feeling cool to the touch  Patient states she missed her Xarelto dose this evening  Patient states pain more severe, but similar in nature to when she presented 8/22 where she required recanalization of L-SFA stent  Patient denies any other associated symptoms including lightheadedness, syncope, fevers/chills, shortness of breath, chest pain, abdominal pain, n/v/d, urinary symptoms or any complaints time  Patient denies pain medication prior to arrival  Left toes cool to the touch, strong left femoral pulse, unable to find left DP, PT, and popliteal pulse with doppler, strong femoral, DP, and PT pulses on doppler on right  Patient s/p L SFA stent placement 8/11/21 d/t non-healing ulcer to left 5th toe with long segment SFA occlusion  "Lesion crossed and treated with 2, 120 x 6 mm Viola stents  Moderate disease in the mid/distal peroneal artery  The patient was coughing and having difficulty holding still so the decision to not treat this area at this time was made "     Patient presented to [facility] with left lower leg pain 8/22/21, transferred to [facility] [place] where arterial duplex performed with evidence of SFA stent occlusion and popliteal stenosis  8/25/21 patient underwent IR angiogram with "Successful recanalization of occluded L-SFA stent w/thrombectomy" Findings include "chronic appearing stenoses at prox/distal ends of stent, angioplastied using 6mm balloon   Residual clot and stenoses at distal end of stent  Stent extended distally into prox pop artery using 6mm x 120mm Viola stent  There remained dominant flow to left foot through the PT  Peroneal appeared diminutive  AT was chronically occluded " Patient discharged on xarelto, plavix, and statin  During this admission, patient also found to have cellulitis with possible joint involvement, started on vanco, left knee prepatellar bursa aspiration with MRSA, blood cultures negative x 72 hours, discharged on doxy for total 10 days of abx  Patient states finished antibiotic, notes pain to left knee resolved  History provided by:  Patient and medical records   used: No        Prior to Admission Medications   Prescriptions Last Dose Informant Patient Reported? Taking? BD Pen Needle Micro U/F 32G X 6 MM MISC  Self No No   Sig: use 1 NEEDLE to inject MEDICATION subcutaneously twice a day   Blood Glucose Monitoring Suppl (True Metrix Air Glucose Meter) w/Device KIT  Self No No   Sig: use as directed   Blood Pressure Monitoring (Blood Pressure Monitor Automat) KATLYN  Self No No   Sig: Use Daily as Directed   Dulaglutide (Trulicity) 1 5 SS/4 4AG SOPN   No No   Sig: Tony Roberto   QUEtiapine (SEROquel) 300 mg tablet  Self No No   Sig: take 1 tablet by mouth once daily   acetaminophen (TYLENOL) 325 mg tablet   No No   Sig: Take 2 tablets (650 mg total) by mouth every 6 (six) hours as needed for mild pain   albuterol (2 5 mg/3 mL) 0 083 % nebulizer solution   No No   Sig: TAKE 3 ML VIA NEBULIZER EVERY 6 (SIX) HOURS AS NEEDED FOR WHEEZING OR SHORTNESS OF BREATH   atorvastatin (LIPITOR) 40 mg tablet  Self Yes No   chlorproMAZINE (THORAZINE) 100 mg tablet  Self Yes No   Sig: Take 100 mg by mouth daily at bedtime   clopidogrel (PLAVIX) 75 mg tablet   No No   Sig: Take 1 tablet (75 mg total) by mouth daily   glucose blood test strip   No No   Sig: Use one strip 3 times daily for blood glucose testing   Patient has True Metrix glucometer glucose monitoring kit (FREESTYLE) monitoring kit  Self No No   Sig: Use 1 each daily Use one each daily to check blood sugars   Please refill with freestyle ed or with whichever brand is covered by insurance   hydrOXYzine HCL (ATARAX) 50 mg tablet  Self Yes No   Sig: Take 50 mg by mouth 3 (three) times a day   insulin glargine (Lantus SoloStar) 100 units/mL injection pen   No No   Sig: Inject 40 Units under the skin daily   lamoTRIgine (LaMICtal) 25 mg tablet   No No   Sig: TAKE ONE (1) TABLET BY MOUTH DAILY   lisinopril (ZESTRIL) 10 mg tablet   No No   Sig: TAKE TWO (2) TABLETS BY MOUTH DAILY   lurasidone (LATUDA) 80 mg tablet   No No   Sig: Take 1 tablet (80 mg total) by mouth daily with breakfast   metFORMIN (GLUCOPHAGE) 1000 MG tablet   No No   Sig: TAKE ONE (1) TABLET BY MOUTH TWICE DAILY WITH FOOD   nicotine (NICODERM CQ) 21 mg/24 hr TD 24 hr patch   No No   Sig: Place 1 patch on the skin daily   prazosin (MINIPRESS) 1 mg capsule  Self Yes No   Sig: Take 1 mg by mouth daily at bedtime   rivaroxaban (XARELTO) 15 mg tablet   No No   Sig: Take 1 tablet (15 mg total) by mouth 2 (two) times a day with meals   traZODone (DESYREL) 100 mg tablet  Self No No   Sig: TAKE 2 TABLETS BY MOUTH DAILY AT BEDTIME      Facility-Administered Medications Last Administration Doses Remaining   lidocaine (LMX) 4 % cream None recorded           Past Medical History:   Diagnosis Date    Asthma     Bipolar 1 disorder (HCC)     COPD (chronic obstructive pulmonary disease) (HCC)     Depression     Diabetes mellitus (HCC)     Hypertension     Psychiatric disorder     cutting history    PTSD (post-traumatic stress disorder)     Tendonitis        Past Surgical History:   Procedure Laterality Date     SECTION      EAR SURGERY      IR LOWER EXTREMITY ANGIOGRAM  2021    IR LOWER EXTREMITY ANGIOGRAM  2021       Family History   Problem Relation Age of Onset    Hypertension Mother     Diabetes Mother    Sakina Arana HIV Mother     Heart disease Mother     No Known Problems Father      I have reviewed and agree with the history as documented  E-Cigarette/Vaping    E-Cigarette Use Never User      E-Cigarette/Vaping Substances    Nicotine No     THC No     CBD No     Flavoring No     Other No     Unknown No      Social History     Tobacco Use    Smoking status: Current Every Day Smoker     Packs/day: 2 00     Years: 17 00     Pack years: 34 00     Types: Cigarettes    Smokeless tobacco: Never Used    Tobacco comment: pt is down to 4 cigarettes a day    Vaping Use    Vaping Use: Never used   Substance Use Topics    Alcohol use: Not Currently    Drug use: Not Currently     Types: Cocaine, Marijuana     Comment: 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       Review of Systems   Constitutional: Negative for chills  HENT: Negative for congestion, drooling, ear pain, rhinorrhea, sore throat, trouble swallowing and voice change  Eyes: Negative for pain, redness and visual disturbance  Respiratory: Negative for cough, shortness of breath, wheezing and stridor  Cardiovascular: Negative for chest pain, palpitations and leg swelling  Gastrointestinal: Negative for abdominal pain, diarrhea, nausea and vomiting  Genitourinary: Negative for dysuria and frequency  Skin: Negative for color change and rash  Neurological: Positive for numbness  Negative for dizziness, syncope, speech difficulty, light-headedness and headaches  All other systems reviewed and are negative  Physical Exam  Physical Exam  Vitals and nursing note reviewed  Constitutional:       General: She is in acute distress  Appearance: She is well-developed  Comments: Patient in acute distress, reporting severe pain, unable to sit still on stretcher, patient tachycardic to 106 bpm, remainder of VSS   HENT:      Head: Normocephalic and atraumatic        Right Ear: External ear normal       Left Ear: External ear normal       Nose: Nose normal  No congestion or rhinorrhea  Mouth/Throat:      Mouth: Mucous membranes are moist    Eyes:      General:         Right eye: No discharge  Left eye: No discharge  Extraocular Movements: Extraocular movements intact  Conjunctiva/sclera: Conjunctivae normal    Cardiovascular:      Rate and Rhythm: Normal rate and regular rhythm  Heart sounds: No murmur heard  No friction rub  No gallop  Pulmonary:      Effort: Pulmonary effort is normal  No respiratory distress  Breath sounds: Normal breath sounds  No stridor  No wheezing, rhonchi or rales  Abdominal:      General: Bowel sounds are normal  There is no distension  Palpations: Abdomen is soft  Tenderness: There is no abdominal tenderness  There is no right CVA tenderness, left CVA tenderness, guarding or rebound  Musculoskeletal:      Cervical back: Normal range of motion and neck supple  Right lower leg: No edema  Left lower leg: No edema  Comments: Left toes cool to touch, remainder of left LE and right LE warm to touch, strong left femoral pulse present, unable to find left popliteal, DP, PT pulse with doppler  Strong right femoral, DP, and PT pulses  Patient in severe pain, unchanged with palpation, no erythema or swelling  Skin:     General: Skin is warm and dry  Capillary Refill: Capillary refill takes less than 2 seconds  Findings: No bruising, erythema or rash  Neurological:      General: No focal deficit present  Mental Status: She is alert and oriented to person, place, and time     Psychiatric:         Mood and Affect: Mood normal          Vital Signs  ED Triage Vitals [09/08/21 0420]   Temperature Pulse Respirations Blood Pressure SpO2   98 1 °F (36 7 °C) (!) 110 16 130/80 98 %      Temp Source Heart Rate Source Patient Position - Orthostatic VS BP Location FiO2 (%)   Oral Monitor Lying -- --      Pain Score       Worst Possible Pain           Vitals:    09/08/21 0420 09/08/21 0500   BP: 130/80 138/80   Pulse: (!) 110 (!) 106   Patient Position - Orthostatic VS: Lying          Visual Acuity      ED Medications  Medications   heparin (porcine) 25,000 units in 0 45% NaCl 250 mL infusion (premix) (11 1 Units/kg/hr × 90 kg (Order-Specific) Intravenous New Bag 9/8/21 0527)   heparin (porcine) injection 4,000 Units (has no administration in time range)   heparin (porcine) injection 2,000 Units (has no administration in time range)   sodium chloride 0 9 % bolus 1,000 mL (1,000 mL Intravenous New Bag 9/8/21 0542)   morphine (PF) 4 mg/mL injection 4 mg (4 mg Intravenous Given 9/8/21 0447)   ketorolac (TORADOL) injection 15 mg (15 mg Intravenous Given 9/8/21 0511)   heparin (porcine) injection 4,000 Units (4,000 Units Intravenous Given 9/8/21 0526)   insulin regular (HumuLIN R,NovoLIN R) injection 5 Units (5 Units Subcutaneous Given 9/8/21 0542)   potassium chloride (K-DUR,KLOR-CON) CR tablet 40 mEq (40 mEq Oral Given 9/8/21 0542)       Diagnostic Studies  Results Reviewed     Procedure Component Value Units Date/Time    Hemoglobin A1C [[id number]] Collected: 09/08/21 0448    Lab Status: In process Specimen: Blood from Arm, Right Updated: 09/08/21 0604    Lactic acid [[id number]]  (Abnormal) Collected: 09/08/21 0447    Lab Status: Final result Specimen: Blood from Arm, Right Updated: 09/08/21 0535     LACTIC ACID 2 7 mmol/L     Narrative:      Result may be elevated if tourniquet was used during collection      Lactic acid 2 Hours [[id number]]     Lab Status: No result Specimen: Blood     Comprehensive metabolic panel [[id number]]  (Abnormal) Collected: 09/08/21 0447    Lab Status: Final result Specimen: Blood from Arm, Right Updated: 09/08/21 0532     Sodium 140 mmol/L      Potassium 3 1 mmol/L      Chloride 102 mmol/L      CO2 27 mmol/L      ANION GAP 11 mmol/L      BUN 18 mg/dL      Creatinine 1 08 mg/dL      Glucose 414 mg/dL      Calcium 8 8 mg/dL      AST 11 U/L      ALT 16 U/L Alkaline Phosphatase 76 9 U/L      Total Protein 6 6 g/dL      Albumin 3 5 g/dL      Total Bilirubin 0 24 mg/dL      eGFR 77 ml/min/1 73sq m     Narrative:      [place] guidelines for Chronic Kidney Disease (CKD):     Stage 1 with normal or high GFR (GFR > 90 mL/min/1 73 square meters)    Stage 2 Mild CKD (GFR = 60-89 mL/min/1 73 square meters)    Stage 3A Moderate CKD (GFR = 45-59 mL/min/1 73 square meters)    Stage 3B Moderate CKD (GFR = 30-44 mL/min/1 73 square meters)    Stage 4 Severe CKD (GFR = 15-29 mL/min/1 73 square meters)    Stage 5 End Stage CKD (GFR <15 mL/min/1 73 square meters)  Note: GFR calculation is accurate only with a steady state creatinine    Magnesium [[id number]]  (Abnormal) Collected: 09/08/21 0447    Lab Status: Final result Specimen: Blood from Arm, Right Updated: 09/08/21 0523     Magnesium 1 5 mg/dL     Protime-INR [[id number]]  (Abnormal) Collected: 09/08/21 0448    Lab Status: Final result Specimen: Blood from Arm, Right Updated: 09/08/21 0510     Protime 13 5 seconds      INR 1 21    Narrative:      INR Reference Ranges:  No Anticoagulant, Normal:           0 9-1 1  Standard Dose, Oral Anticoagulant:  2 0-3 0  High Dose, Oral Anticoagulant:      2 5-3 5    APTT [[id number]]  (Normal) Collected: 09/08/21 0448    Lab Status: Final result Specimen: Blood from Arm, Right Updated: 09/08/21 0510     PTT 31 seconds     CBC and differential [[id number]]  (Abnormal) Collected: 09/08/21 0448    Lab Status: Final result Specimen: Blood from Arm, Right Updated: 09/08/21 0500     WBC 11 07 Thousand/uL      RBC 3 84 Million/uL      Hemoglobin 10 9 g/dL      Hematocrit 30 1 %      MCV 78 fL      MCH 28 4 pg      MCHC 36 2 g/dL      RDW 12 8 %      MPV 10 6 fL      Platelets 282 Thousands/uL      Neutrophils Relative 82 %      Immat GRANS % 0 %      Lymphocytes Relative 14 %      Monocytes Relative 4 %      Eosinophils Relative 0 %      Basophils Relative 0 % Neutrophils Absolute 8 96 Thousands/µL      Immature Grans Absolute 0 03 Thousand/uL      Lymphocytes Absolute 1 59 Thousands/µL      Monocytes Absolute 0 45 Thousand/µL      Eosinophils Absolute 0 02 Thousand/µL      Basophils Absolute 0 02 Thousands/µL                  No orders to display              Procedures  Procedures         ED Course  ED Course as of Sep 08 0619   Wed Sep 08, 2021   0520 Discussed case with vascular surgeon Dr Leigh Odom who recommends heparin bolus with subsequent heparin infusion  Patient to be transferred to [facility]       0534 K to 3 1, will replete  Glucose to 414, will give 5 units humulin and IVF as patient to be transferred to [facility] shortly  3159 Lactic to 2 7      0546 Dr Wang [place] Doctor accepts patient  SBIRT 22yo+      Most Recent Value   SBIRT (24 yo +)   In order to provide better care to our patients, we are screening all of our patients for alcohol and drug use  Would it be okay to ask you these screening questions? No Filed at: 09/08/2021 0450                    MDM  Number of Diagnoses or Management Options  Atherosclerosis of native artery of left leg with rest pain [facility])  Diagnosis management comments: Patel Smith is a 30year old female with a pertinent PMHx of T2DM and atherosclerosis of left leg s/p left superior femoral artery stent placement 8/11/21 and recannulization 8/25 with extension distally into prox pop artery, on xarelto and plavix, who presents to the ED with diffuse left lower leg pain from knee to toes that began yesterday morning around 8AM with associated numbness/tingling, difficulty ambulating, with toes feeling cool to the touch, missed Xarelto dose this evening  Upon on examination, left toes cool to the touch, strong left femoral pulse, unable to find left DP, PT, and popliteal pulse with doppler, strong femoral, DP, and PT pulses on doppler on right   Case discussed with vascular surgery resident Dr Leigh Odom who recommends heparin bolus with subsequent infusion  Lactic to 2 7, glucose to 414, K to 3 1, was repleted  Started with 5 units humulin with IVF to prevent hypoglycemia during transfer to [facility], Dr Aleja Galdamez Doctor accepts patient  Results discussed with patient, who verbalized understanding and agreement with the management plan  Disposition  Final diagnoses:   Atherosclerosis of native artery of left leg with rest pain ([facility] [address] )     Time reflects when diagnosis was documented in both MDM as applicable and the Disposition within this note     Time User Action Codes Description Comment    9/8/2021  5:17 AM Paz Vasu Add [M79 605] Left leg pain     9/8/2021  5:19 AM Paz [place] Add [I70 222] Atherosclerosis of native artery of left leg with rest pain ([facility] [address] )     9/8/2021  5:19 AM Paz [place] Modify [M79 605] Left leg pain     9/8/2021  5:19 AM Paz Vasu Modify [I70 222] Atherosclerosis of native artery of left leg with rest pain ([facility] [address] )     9/8/2021  5:19 AM Paz Vasu Remove [M79 605] Left leg pain       ED Disposition     ED Disposition Condition Date/Time Comment    Transfer to Another Facility-In Network  Wed Sep 8, 2021  5:17 AM Juancarloshong Guerra should be transferred out to [facility]          MD Documentation      Most Recent Value   Patient Condition  The patient has been stabilized such that within reasonable medical probability, no material deterioration of the patient condition or the condition of the unborn child(rosalie) is likely to result from the transfer   Reason for Transfer  Level of Care needed not available at this facility   Benefits of Transfer  Specialized equipment and/or services available at the receiving facility (Include comment)________________________ [facility]don Frohlich surgery]   Risks of Transfer  Potential for delay in receiving treatment, Potential deterioration of medical condition, Loss of IV, Increased discomfort during transfer, Possible worsening of condition or death during transfer   Accepting Physician  Dr Jay Melo Name, Italia Rivear   Sending MD Carpio/Monster KENDALL   Provider Certification  General risk, such as traffic hazards, adverse weather conditions, rough terrain or turbulence, possible failure of equipment (including vehicle or aircraft), or consequences of actions of persons outside the control of the transport personnel, Unanticipated needs of medical equipment and personnel during transport, Risk of worsening condition, The possibility of a transport vehicle being unavailable      RN Documentation      [address] [facility], [address]   SLB      Follow-up Information    None         Discharge Medication List as of 9/8/2021  6:12 AM      CONTINUE these medications which have NOT CHANGED    Details   acetaminophen (TYLENOL) 325 mg tablet Take 2 tablets (650 mg total) by mouth every 6 (six) hours as needed for mild pain, Starting Fri 8/27/2021, No Print      albuterol (2 5 mg/3 mL) 0 083 % nebulizer solution TAKE 3 ML VIA NEBULIZER EVERY 6 (SIX) HOURS AS NEEDED FOR WHEEZING OR SHORTNESS OF BREATH, Normal      atorvastatin (LIPITOR) 40 mg tablet Starting Sat 8/8/2020, Historical Med      BD Pen Needle Micro U/F 32G X 6 MM MISC use 1 NEEDLE to inject MEDICATION subcutaneously twice a day, Normal      Blood Glucose Monitoring Suppl (True Metrix Air Glucose Meter) w/Device KIT use as directed, Normal      Blood Pressure Monitoring (Blood Pressure Monitor Automat) KATLYN Use Daily as Directed, Print      chlorproMAZINE (THORAZINE) 100 mg tablet Take 100 mg by mouth daily at bedtime, Starting Wed 11/18/2020, Historical Med      clopidogrel (PLAVIX) 75 mg tablet Take 1 tablet (75 mg total) by mouth daily, Starting Wed 8/11/2021, Normal      Dulaglutide (Trulicity) 1 5 DEANNA/8 9XD SOPN Tony Roberto, Print      glucose blood test strip Use one strip 3 times daily for blood glucose testing   Patient has True Metrix glucometer, Normal      glucose monitoring kit (FREESTYLE) monitoring kit Use 1 each daily Use one each daily to check blood sugars  Please refill with freestyle ed or with whichever brand is covered by insurance, Starting Tue 11/24/2020, Normal      hydrOXYzine HCL (ATARAX) 50 mg tablet Take 50 mg by mouth 3 (three) times a day, Starting Wed 11/18/2020, Historical Med      insulin glargine (Lantus SoloStar) 100 units/mL injection pen Inject 40 Units under the skin daily, Starting Fri 8/27/2021, Normal      lamoTRIgine (LaMICtal) 25 mg tablet TAKE ONE (1) TABLET BY MOUTH DAILY, Normal      lisinopril (ZESTRIL) 10 mg tablet TAKE TWO (2) TABLETS BY MOUTH DAILY, Normal      lurasidone (LATUDA) 80 mg tablet Take 1 tablet (80 mg total) by mouth daily with breakfast, Starting Sat 8/28/2021, Normal      metFORMIN (GLUCOPHAGE) 1000 MG tablet TAKE ONE (1) TABLET BY MOUTH TWICE DAILY WITH FOOD, Normal      nicotine (NICODERM CQ) 21 mg/24 hr TD 24 hr patch Place 1 patch on the skin daily, Starting Sat 8/28/2021, Normal      prazosin (MINIPRESS) 1 mg capsule Take 1 mg by mouth daily at bedtime, Starting Wed 11/18/2020, Historical Med      QUEtiapine (SEROquel) 300 mg tablet take 1 tablet by mouth once daily, Normal      rivaroxaban (XARELTO) 15 mg tablet Take 1 tablet (15 mg total) by mouth 2 (two) times a day with meals, Starting Fri 8/27/2021, Normal      traZODone (DESYREL) 100 mg tablet TAKE 2 TABLETS BY MOUTH DAILY AT BEDTIME, Normal           No discharge procedures on file      PDMP Review       Value Time User    PDMP Reviewed  Yes 4/25/2021  8:59 PM Bert Olmedo MD          ED Provider  Electronically Signed by           Lissette Alcantar PA-C  09/08/21 0289

## 2021-09-08 NOTE — EMTALA/ACUTE CARE TRANSFER
[facility] EMERGENCY DEPARTMENT  [address] [facility] [address]  Dept: [phone]      EMTALA TRANSFER CONSENT    NAME Jeanine WALSH 1985                              MRN [id number]    I have been informed of my rights regarding examination, treatment, and transfer   by Dr Brianna Faye MD    Benefits: Specialized equipment and/or services available at the receiving facility (Include comment)________________________ (Vascular surgery)    Risks: Potential for delay in receiving treatment, Potential deterioration of medical condition, Loss of IV, Increased discomfort during transfer, Possible worsening of condition or death during transfer      Consent for Transfer:  I acknowledge that my medical condition has been evaluated and explained to me by the emergency department physician or other qualified medical person and/or my attending physician, who has recommended that I be transferred to the service of  Accepting Physician: Dr Zhang Gorman Doctor at [address] Name, [address] : [facility] AND [facility]  The above potential benefits of such transfer, the potential risks associated with such transfer, and the probable risks of not being transferred have been explained to me, and I fully understand them  The doctor has explained that, in my case, the benefits of transfer outweigh the risks  I agree to be transferred  I authorize the performance of emergency medical procedures and treatments upon me in both transit and upon arrival at the receiving facility  Additionally, I authorize the release of any and all medical records to the receiving facility and request they be transported with me, if possible  I understand that the safest mode of transportation during a medical emergency is an ambulance and that the Hospital advocates the use of this mode of transport   Risks of traveling to the receiving facility by car, including absence of medical control, life sustaining equipment, such as oxygen, and medical personnel has been explained to me and I fully understand them  (MARIFER CORRECT BOX BELOW)  [  ]  I consent to the stated transfer and to be transported by ambulance/helicopter  [  ]  I consent to the stated transfer, but refuse transportation by ambulance and accept full responsibility for my transportation by car  I understand the risks of non-ambulance transfers and I exonerate the Hospital and its staff from any deterioration in my condition that results from this refusal     X___________________________________________    DATE  21  TIME________  Signature of patient or legally responsible individual signing on patient behalf           RELATIONSHIP TO PATIENT_________________________          Provider Certification    NAME Michael Brandon                                         1985                              MRN [id number]    A medical screening exam was performed on the above named patient  Based on the examination:    Condition Necessitating Transfer The encounter diagnosis was Atherosclerosis of native artery of left leg with rest pain ([address] )      Patient Condition: The patient has been stabilized such that within reasonable medical probability, no material deterioration of the patient condition or the condition of the unborn child(rosalie) is likely to result from the transfer    Reason for Transfer: Level of Care needed not available at this facility    Transfer Requirements: Facility [facility]   · Space available and qualified personnel available for treatment as acknowledged by    · Agreed to accept transfer and to provide appropriate medical treatment as acknowledged by       Dr Main Sinclair Doctor  · Appropriate medical records of the examination and treatment of the patient are provided at the time of transfer   [address] _______  · Transfer will be performed by qualified personnel from    and appropriate transfer equipment as required, including the use of necessary and appropriate life support measures  Provider Certification: I have examined the patient and explained the following risks and benefits of being transferred/refusing transfer to the patient/family:  General risk, such as traffic hazards, adverse weather conditions, rough terrain or turbulence, possible failure of equipment (including vehicle or aircraft), or consequences of actions of persons outside the control of the transport personnel, Unanticipated needs of medical equipment and personnel during transport, Risk of worsening condition, The possibility of a transport vehicle being unavailable      Based on these reasonable risks and benefits to the patient and/or the unborn child(rosalie), and based upon the information available at the time of the patients examination, I certify that the medical benefits reasonably to be expected from the provision of appropriate medical treatments at another medical facility outweigh the increasing risks, if any, to the individuals medical condition, and in the case of labor to the unborn child, from effecting the transfer      X____________________________________________ DATE 09/08/21        TIME_______      ORIGINAL - SEND TO MEDICAL RECORDS   COPY - SEND WITH PATIENT DURING TRANSFER

## 2021-09-08 NOTE — ED NOTES
As per PACS, [facility] will be transporting pt to B ED at approx 0615  Accepting physician Dr Quita Crigler Doctor   Number for report [phone] Vega Rangel RN  09/08/21 4030

## 2021-09-08 NOTE — CONSULTS
e-Consult (IPC)  - Interventional Radiology  Michael Nose 30 y o  female MRN: [id number]  Unit/Bed#: ED 22 Encounter: [id number]    IR has been consulted to evaluate the patient, determine the appropriate procedure, and whether or not a procedure can and should be performed regarding the care of Michael Nose  Consults  09/08/21      Assessment/Recommendation:   60-year-old female with history of diabetes, COPD, bipolar, peripheral arterial disease status post left SFA stenting on 08/11/2021 followed by subsequent stent thrombosis and thrombectomy on 08/25/2021 presents with left leg pain concerning for left SFA stent reocclusion     - plan for left lower extremity arteriogram with possible intervention today  - please keep patient NPO      Total time spent in review of data, discussion with requesting provider and rendering advice was 10 min  Thank you for allowing Interventional Radiology to participate in the care of Michael Nose  Please don't hesitate to call or TigerText us with any questions       Kristina Vitale MD

## 2021-09-08 NOTE — ED NOTES
Heparin ACS/LOW at 11 1mg/kg/hr continued from pre-hospital at this time        Gisela Munson RN  09/08/21 1372

## 2021-09-08 NOTE — ASSESSMENT & PLAN NOTE
Lab Results   Component Value Date    HGBA1C 9 9 (H) 08/24/2021       Recent Labs     09/08/21  0702   POCGLU 296*       Blood Sugar Average: Last 72 hrs:  (P) 296   uncontrolled as outpatient   PTA Lantus 40 units q h s    continue Lantus at 40 units q h s    added sliding scale coverage algorithm 3

## 2021-09-08 NOTE — ED NOTES
Pt requesting pain medication at this time  Dr Calista Albright Doctor made aware        Margoth Shine, RN  09/08/21 0222

## 2021-09-08 NOTE — ASSESSMENT & PLAN NOTE
Elevated BP OVN, likely due to pain, improved this morning  PTA lisinopril 20 mg, prazosin 1 mg q h s    Will increase lisinopril to 40 mg, continue prazosin qhs

## 2021-09-08 NOTE — ASSESSMENT & PLAN NOTE
History of left leg atherosclerosis status post left SFA stent complicated by thrombosis, thrombectomy on 08/25, now with reocclusion of the L  SFA stent in the setting of most likely medication noncompliance   please refer to left leg pain for the plan

## 2021-09-09 ENCOUNTER — APPOINTMENT (INPATIENT)
Dept: RADIOLOGY | Facility: HOSPITAL | Age: 36
DRG: 253 | End: 2021-09-09
Payer: MEDICARE

## 2021-09-09 ENCOUNTER — APPOINTMENT (OUTPATIENT)
Dept: NON INVASIVE DIAGNOSTICS | Facility: HOSPITAL | Age: 36
DRG: 253 | End: 2021-09-09
Payer: MEDICARE

## 2021-09-09 ENCOUNTER — TELEPHONE (OUTPATIENT)
Dept: VASCULAR SURGERY | Facility: CLINIC | Age: 36
End: 2021-09-09

## 2021-09-09 LAB
ANION GAP SERPL CALCULATED.3IONS-SCNC: 2 MMOL/L (ref 4–13)
APTT PPP: 75 SECONDS (ref 23–37)
APTT PPP: 89 SECONDS (ref 23–37)
BUN SERPL-MCNC: 19 MG/DL (ref 5–25)
CALCIUM SERPL-MCNC: 7.9 MG/DL (ref 8.3–10.1)
CHLORIDE SERPL-SCNC: 106 MMOL/L (ref 100–108)
CO2 SERPL-SCNC: 29 MMOL/L (ref 21–32)
CREAT SERPL-MCNC: 0.61 MG/DL (ref 0.6–1.3)
ERYTHROCYTE [DISTWIDTH] IN BLOOD BY AUTOMATED COUNT: 13.5 % (ref 11.6–15.1)
GFR SERPL CREATININE-BSD FRML MDRD: 137 ML/MIN/1.73SQ M
GLUCOSE P FAST SERPL-MCNC: 126 MG/DL (ref 65–99)
GLUCOSE SERPL-MCNC: 126 MG/DL (ref 65–140)
GLUCOSE SERPL-MCNC: 141 MG/DL (ref 65–140)
GLUCOSE SERPL-MCNC: 142 MG/DL (ref 65–140)
GLUCOSE SERPL-MCNC: 185 MG/DL (ref 65–140)
GLUCOSE SERPL-MCNC: 206 MG/DL (ref 65–140)
HCT VFR BLD AUTO: 29 % (ref 34.8–46.1)
HGB BLD-MCNC: 10.4 G/DL (ref 11.5–15.4)
MCH RBC QN AUTO: 28.7 PG (ref 26.8–34.3)
MCHC RBC AUTO-ENTMCNC: 35.9 G/DL (ref 31.4–37.4)
MCV RBC AUTO: 80 FL (ref 82–98)
PLATELET # BLD AUTO: 323 THOUSANDS/UL (ref 149–390)
PMV BLD AUTO: 10.3 FL (ref 8.9–12.7)
POTASSIUM SERPL-SCNC: 3.4 MMOL/L (ref 3.5–5.3)
RBC # BLD AUTO: 3.63 MILLION/UL (ref 3.81–5.12)
SODIUM SERPL-SCNC: 137 MMOL/L (ref 136–145)
WBC # BLD AUTO: 8.76 THOUSAND/UL (ref 4.31–10.16)

## 2021-09-09 PROCEDURE — 99232 SBSQ HOSP IP/OBS MODERATE 35: CPT | Performed by: SURGERY

## 2021-09-09 PROCEDURE — 99204 OFFICE O/P NEW MOD 45 MIN: CPT | Performed by: INTERNAL MEDICINE

## 2021-09-09 PROCEDURE — 85027 COMPLETE CBC AUTOMATED: CPT | Performed by: PHYSICIAN ASSISTANT

## 2021-09-09 PROCEDURE — G9197 ORDER FOR CEPH: HCPCS | Performed by: SURGERY

## 2021-09-09 PROCEDURE — 02HV33Z INSERTION OF INFUSION DEVICE INTO SUPERIOR VENA CAVA, PERCUTANEOUS APPROACH: ICD-10-PCS | Performed by: SURGERY

## 2021-09-09 PROCEDURE — 82948 REAGENT STRIP/BLOOD GLUCOSE: CPT

## 2021-09-09 PROCEDURE — 99232 SBSQ HOSP IP/OBS MODERATE 35: CPT | Performed by: FAMILY MEDICINE

## 2021-09-09 PROCEDURE — A9502 TC99M TETROFOSMIN: HCPCS

## 2021-09-09 PROCEDURE — 80048 BASIC METABOLIC PNL TOTAL CA: CPT | Performed by: PHYSICIAN ASSISTANT

## 2021-09-09 PROCEDURE — 85730 THROMBOPLASTIN TIME PARTIAL: CPT | Performed by: FAMILY MEDICINE

## 2021-09-09 PROCEDURE — C1751 CATH, INF, PER/CENT/MIDLINE: HCPCS

## 2021-09-09 PROCEDURE — 99222 1ST HOSP IP/OBS MODERATE 55: CPT | Performed by: INTERNAL MEDICINE

## 2021-09-09 PROCEDURE — 93017 CV STRESS TEST TRACING ONLY: CPT

## 2021-09-09 PROCEDURE — 36569 INSJ PICC 5 YR+ W/O IMAGING: CPT

## 2021-09-09 PROCEDURE — 78452 HT MUSCLE IMAGE SPECT MULT: CPT

## 2021-09-09 PROCEDURE — G1004 CDSM NDSC: HCPCS

## 2021-09-09 PROCEDURE — 93971 EXTREMITY STUDY: CPT

## 2021-09-09 PROCEDURE — 93971 EXTREMITY STUDY: CPT | Performed by: SURGERY

## 2021-09-09 RX ORDER — GABAPENTIN 100 MG/1
200 CAPSULE ORAL ONCE
Status: DISCONTINUED | OUTPATIENT
Start: 2021-09-09 | End: 2021-09-09

## 2021-09-09 RX ORDER — POTASSIUM CHLORIDE 20 MEQ/1
40 TABLET, EXTENDED RELEASE ORAL ONCE
Status: COMPLETED | OUTPATIENT
Start: 2021-09-09 | End: 2021-09-09

## 2021-09-09 RX ORDER — LISINOPRIL 20 MG/1
20 TABLET ORAL ONCE
Status: COMPLETED | OUTPATIENT
Start: 2021-09-09 | End: 2021-09-09

## 2021-09-09 RX ORDER — GABAPENTIN 300 MG/1
300 CAPSULE ORAL ONCE
Status: COMPLETED | OUTPATIENT
Start: 2021-09-09 | End: 2021-09-09

## 2021-09-09 RX ORDER — LISINOPRIL 20 MG/1
40 TABLET ORAL DAILY
Status: DISCONTINUED | OUTPATIENT
Start: 2021-09-10 | End: 2021-09-13

## 2021-09-09 RX ORDER — CYCLOBENZAPRINE HCL 10 MG
5 TABLET ORAL 3 TIMES DAILY
Status: DISCONTINUED | OUTPATIENT
Start: 2021-09-09 | End: 2021-09-17 | Stop reason: HOSPADM

## 2021-09-09 RX ORDER — PRAZOSIN HYDROCHLORIDE 1 MG/1
1 CAPSULE ORAL
Status: DISCONTINUED | OUTPATIENT
Start: 2021-09-09 | End: 2021-09-13

## 2021-09-09 RX ADMIN — ACETAMINOPHEN 975 MG: 325 TABLET, FILM COATED ORAL at 20:49

## 2021-09-09 RX ADMIN — OXYCODONE HYDROCHLORIDE 10 MG: 5 SOLUTION ORAL at 00:58

## 2021-09-09 RX ADMIN — GABAPENTIN 300 MG: 300 CAPSULE ORAL at 20:49

## 2021-09-09 RX ADMIN — INSULIN LISPRO 2 UNITS: 100 INJECTION, SOLUTION INTRAVENOUS; SUBCUTANEOUS at 11:38

## 2021-09-09 RX ADMIN — CHLORPROMAZINE HYDROCHLORIDE 100 MG: 25 TABLET, FILM COATED ORAL at 23:30

## 2021-09-09 RX ADMIN — HEPARIN SODIUM 14.3 UNITS/KG/HR: 10000 INJECTION, SOLUTION INTRAVENOUS at 23:26

## 2021-09-09 RX ADMIN — OXYCODONE HYDROCHLORIDE 10 MG: 5 SOLUTION ORAL at 23:30

## 2021-09-09 RX ADMIN — ACETAMINOPHEN 975 MG: 325 TABLET, FILM COATED ORAL at 06:11

## 2021-09-09 RX ADMIN — CLOPIDOGREL BISULFATE 75 MG: 75 TABLET ORAL at 10:07

## 2021-09-09 RX ADMIN — CHLORPROMAZINE HYDROCHLORIDE 100 MG: 25 TABLET, FILM COATED ORAL at 02:24

## 2021-09-09 RX ADMIN — HYDROMORPHONE HYDROCHLORIDE 1 MG: 1 INJECTION, SOLUTION INTRAMUSCULAR; INTRAVENOUS; SUBCUTANEOUS at 20:50

## 2021-09-09 RX ADMIN — HYDROMORPHONE HYDROCHLORIDE 1 MG: 1 INJECTION, SOLUTION INTRAMUSCULAR; INTRAVENOUS; SUBCUTANEOUS at 11:33

## 2021-09-09 RX ADMIN — NICOTINE 1 PATCH: 21 PATCH, EXTENDED RELEASE TRANSDERMAL at 10:07

## 2021-09-09 RX ADMIN — ATORVASTATIN CALCIUM 40 MG: 40 TABLET, FILM COATED ORAL at 17:00

## 2021-09-09 RX ADMIN — POTASSIUM CHLORIDE 40 MEQ: 1500 TABLET, EXTENDED RELEASE ORAL at 10:07

## 2021-09-09 RX ADMIN — DIPHENHYDRAMINE HCL 25 MG: 25 TABLET ORAL at 23:37

## 2021-09-09 RX ADMIN — HYDROMORPHONE HYDROCHLORIDE 1 MG: 1 INJECTION, SOLUTION INTRAMUSCULAR; INTRAVENOUS; SUBCUTANEOUS at 17:00

## 2021-09-09 RX ADMIN — CYCLOBENZAPRINE HYDROCHLORIDE 5 MG: 5 TABLET, FILM COATED ORAL at 20:50

## 2021-09-09 RX ADMIN — ACETAMINOPHEN 975 MG: 325 TABLET, FILM COATED ORAL at 11:33

## 2021-09-09 RX ADMIN — INSULIN GLARGINE 40 UNITS: 100 INJECTION, SOLUTION SUBCUTANEOUS at 23:39

## 2021-09-09 RX ADMIN — INSULIN LISPRO 1 UNITS: 100 INJECTION, SOLUTION INTRAVENOUS; SUBCUTANEOUS at 23:31

## 2021-09-09 RX ADMIN — HYDROMORPHONE HYDROCHLORIDE 1 MG: 1 INJECTION, SOLUTION INTRAMUSCULAR; INTRAVENOUS; SUBCUTANEOUS at 02:24

## 2021-09-09 RX ADMIN — LISINOPRIL 20 MG: 20 TABLET ORAL at 11:33

## 2021-09-09 RX ADMIN — OXYCODONE HYDROCHLORIDE 10 MG: 5 SOLUTION ORAL at 06:10

## 2021-09-09 RX ADMIN — LISINOPRIL 20 MG: 20 TABLET ORAL at 10:07

## 2021-09-09 NOTE — PROCEDURES
Insert PICC line    Date/Time: 9/9/2021 11:24 AM  Performed by: Cristel Lincoln RN  Authorized by: Janae Simms MD     Patient location:  Bedside  Other Assisting Provider: Yes (comment) ([email or web address])    Consent:     Consent obtained:  Written (obtained by MD)    Consent given by:  Patient    Procedural risks discussed: reviewed by MD   Universal protocol:     Procedure explained and questions answered to patient or proxy's satisfaction: yes      Relevant documents present and verified: yes      Test results available and properly labeled: yes      Radiology Images displayed and confirmed  If images not available, report reviewed: yes      Required blood products, implants, devices, and special equipment available: yes      Site/side marked: yes      Immediately prior to procedure, a time out was called: yes      Patient identity confirmed:  Verbally with patient and arm band  Pre-procedure details:     Hand hygiene: Hand hygiene performed prior to insertion      Sterile barrier technique: All elements of maximal sterile technique followed      Skin preparation:  ChloraPrep    Skin preparation agent: Skin preparation agent completely dried prior to procedure    Indications:     PICC line indications: no peripheral vascular access    Anesthesia (see MAR for exact dosages):      Anesthesia method:  Local infiltration    Local anesthetic:  Lidocaine 1% w/o epi (2mL)  Procedure details:     Location:  Basilic    Vessel type: vein      Laterality:  Left    Site selection rationale:  Easeir side at the time with room setup    Approach: percutaneous technique used      Patient position:  Flat    Procedural supplies:  Double lumen    Catheter size:  5 Fr    Landmarks identified: yes      Ultrasound guidance: yes      Sterile ultrasound techniques: Sterile gel and sterile probe covers were used      Number of attempts:  1    Successful placement: yes      Vessel of catheter tip end:  Sherlock 3CG confirmed    Total catheter length (cm):  47    Catheter out on skin (cm):  1    Max flow rate:  999    Arm circumference:  43  Post-procedure details:     Post-procedure:  Dressing applied and securement device placed    Assessment:  Blood return through all ports and free fluid flow    Post-procedure complications: none      Patient tolerance of procedure:   Tolerated well, no immediate complications

## 2021-09-09 NOTE — CONSULTS
Hematology & Medical Oncology Consultation     Name: Elisa Key   Age & Sex: 30 y o  female   MRN: [id number]  [address]Bed#: [address]   Encounter: [id number]    PATIENT INFORMATION     Name: Elisa Key   Age & Sex: 30 y o  female   MRN: [id number]  Hospital Stay Days: 0    ASSESSMENT/PLAN      Ms Zonia Fabry is a 30year old female with recurrent arterial stent thrombosis in the setting of L SFA stent placement due to atherosclerosis  Given the newly diagnosed thrombosis in the setting of Clopidogrel non-adherence due to a misunderstanding, this new thrombosis likely represents a provoked thrombosis due to medication non-adherence versus a true medication failure versus pro-thrombotic conditions  A thrombosis panel in the setting of acute thrombosis is not recommended, although she will continue to require anticoagulation until she can follow up with our office in the outpatient setting as a pro-thrombotic condition cannot be ruled out at this time given the recurrent nature and a slight family history  Plan:  · Continue heparin gtt and Plavix per primary team  Vascular surgery and IR are on board and further recommendations regarding treatment of the SFA occlusion are appreciated  · Recommend continuing a DOAC and Plavix once stable for discharge  The DOAC would have to be at therapeutic dose for 10 days since admission before it can be transitioned to PPx dosing  · Recommend outpatient appointment with Hematology within 2-3 weeks from discharge for further evaluation  SUBJECTIVE   Reason for Consultation: Recurrent SFA stent thrombosis     HPI: Ms Zonia Fabry is a 30year old female with a past medical history significant for bipolar disorder, T2DM, HTN and atherosclerosis of the LLE who is consulted to our service due to recurrent SFA stent thrombosis  Of note, she originally had 2 142u0ro Viola stents placed on 8/11/2021 due to atherosclerotic occlusion of the L SFA   At that time she states that she was discharged home on ASA 81 mg daily and Plavix which she denies any difficulty taking  She then presented to [facility] on 8/22 with L knee pain and was subsequently diagnosed, and treated for, MRSA related prepatellar bursitis  During the initial workup, doppler studies of the lower extremities revealed that there was occlusion of the recently placed L SFA stent  Ms Royce Mon subsequently underwent thrombectomy and was ultimately discharged home on Rivaroxaban and Clopidogrel  She then presented to the [facility] ED on 9/8 complaining of approximately 24 hours of worsening L foot and leg pain  She describes this pain as "nothing I've ever felt before" and was not resolved with over the counter medications  Also expressed concern that she was not able to "feel things" in her foot, and felt that she had to drag it behind her  Lower limb doppler studies of the LLE revealed occlusion vs high grade stenosis of the proximal to distal SFA stent with an NIKKI of 0 41  Of note, Ms Royce Mon reports that she self-discontinued the Plavix at home over the past week as she thought that it was an "either or situation" with the Plavix and Rivaroxaban  Talking with her today, she states that she has had minimal improvement of her pain on the current regimen and remains concerned that she is unable to "feel things like I should" in the left lower extremity  Denies any pain in the RLE  Denies any fever, chills, nausea, vomiting, or abdominal pain  Reports that her appetite has been good with normal urine and stool output as well  Family history is notable only for a DVT in her mother although the patient is unsure of the circumstances surrounding this diagnosis and her mother has since passed away from other causes  Review of Systems: As per HPI, a pertinent ROS is otherwise negative      Past Medical History:   Diagnosis Date    Asthma     Bipolar 1 disorder ([facility] [address] )     COPD (chronic obstructive pulmonary disease) ([place] [address] )     Depression     Diabetes mellitus ([place] [address] )     Hypertension     Psychiatric disorder     cutting history    PTSD (post-traumatic stress disorder)     Tendonitis        Past Surgical History:   Procedure Laterality Date     SECTION      EAR SURGERY      IR LOWER EXTREMITY ANGIOGRAM  2021    IR LOWER EXTREMITY ANGIOGRAM  2021       Family History   Problem Relation Age of Onset    Hypertension Mother     Diabetes Mother     HIV Mother     Heart disease Mother     No Known Problems Father        Social History     Socioeconomic History    Marital status: /Civil Union     Spouse name: None    Number of children: None    Years of education: None    Highest education level: None   Occupational History    None   Tobacco Use    Smoking status: Current Every Day Smoker     Packs/day: 2 00     Years: 17 00     Pack years: 34 00     Types: Cigarettes    Smokeless tobacco: Never Used    Tobacco comment: pt is down to 4 cigarettes a day    Vaping Use    Vaping Use: Never used   Substance and Sexual Activity    Alcohol use: Not Currently     Alcohol/week: 1 0 standard drinks     Types: 1 Shots of liquor per week     Comment: 'I would drink whatever "    Drug use: Not Currently     Types: Cocaine, Marijuana     Comment: 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    Sexual activity: Yes     Partners: Female, Male     Birth control/protection: None, Condom   Other Topics Concern    None   Social History Narrative    None     Social Determinants of Health     Financial Resource Strain:     Difficulty of Paying Living Expenses:    Food Insecurity:     Worried About Running Out of Food in the Last Year:     Ran Out of Food in the Last Year:    Transportation Needs:     Lack of Transportation (Medical):      Lack of Transportation (Non-Medical):    Physical Activity:     Days of Exercise per Week:     Minutes of Exercise per Session:    Stress:     Feeling of Stress :    Social Connections:     Frequency of Communication with Friends and Family:     Frequency of Social Gatherings with Friends and Family:     Attends [employer] Services:     Active Member of Clubs or Organizations:     Attends Club or Organization Meetings:     Marital Status:    Intimate Partner Violence:     Fear of Current or Ex-Partner:     Emotionally Abused:     Physically Abused:     Sexually Abused:        Facility-Administered Medications Prior to Admission   Medication    lidocaine (LMX) 4 % cream     Medications Prior to Admission   Medication    acetaminophen (TYLENOL) 325 mg tablet    albuterol (2 5 mg/3 mL) 0 083 % nebulizer solution    BD Pen Needle Micro U/F 32G X 6 MM MISC    Blood Glucose Monitoring Suppl (True Metrix Air Glucose Meter) w/Device KIT    chlorproMAZINE (THORAZINE) 100 mg tablet    clopidogrel (PLAVIX) 75 mg tablet    Dulaglutide (Trulicity) 1 5 ZH/3 4ZX SOPN    glucose blood test strip    glucose monitoring kit (FREESTYLE) monitoring kit    hydrOXYzine HCL (ATARAX) 50 mg tablet    insulin glargine (Lantus SoloStar) 100 units/mL injection pen    lisinopril (ZESTRIL) 10 mg tablet    lurasidone (LATUDA) 80 mg tablet    metFORMIN (GLUCOPHAGE) 1000 MG tablet    prazosin (MINIPRESS) 1 mg capsule    QUEtiapine (SEROquel) 300 mg tablet    rivaroxaban (XARELTO) 15 mg tablet    atorvastatin (LIPITOR) 40 mg tablet    Blood Pressure Monitoring (Blood Pressure Monitor Automat) KATLYN    lamoTRIgine (LaMICtal) 25 mg tablet    nicotine (NICODERM CQ) 21 mg/24 hr TD 24 hr patch    traZODone (DESYREL) 100 mg tablet       No Known Allergies    OBJECTIVE     Vitals:    21 1955 21 0015 21 0200 21 0700   BP:  (!) 192/111 158/68 159/78   BP Location:   Left arm Left arm   Pulse: 92 81 78 77   Resp:  20  14   Temp:  98 1 °F (36 7 °C)     TempSrc:    Oral   SpO2: 99% 97%  97%      Temperature:   Temp (24hrs), Av 8 °F (36 6 °C), Min:97 5 °F (36 4 °C), Max:98 1 °F (36 7 °C)    Temperature: 98 1 °F (36 7 °C)    Physical Exam  Vitals reviewed  Constitutional:       General: She is awake  HENT:      Head: Normocephalic and atraumatic  Nose: Nose normal    Eyes:      Conjunctiva/sclera: Conjunctivae normal    Cardiovascular:      Rate and Rhythm: Normal rate and regular rhythm  Pulses:           Dorsalis pedis pulses are 2+ on the right side and 1+ on the left side  Posterior tibial pulses are 2+ on the right side and 1+ on the left side  Pulmonary:      Effort: Pulmonary effort is normal       Breath sounds: Normal breath sounds  Abdominal:      General: Bowel sounds are normal       Palpations: Abdomen is soft  Tenderness: There is no abdominal tenderness  Musculoskeletal:      Comments: LLE notably cooler compared to the RLE   Neurological:      Mental Status: She is alert and oriented to person, place, and time  Sensory: Sensory deficit (diminished sensation in the LLE below the knee in a non-dermatomal distribution) present  Psychiatric:         Behavior: Behavior is cooperative  LABORATORY DATA     Labs: I have personally reviewed pertinent reports    Results from last 7 days   Lab Units 09/09/21  0610 09/08/21  0448   WBC Thousand/uL 8 76 11 07*   HEMOGLOBIN g/dL 10 4* 10 9*   HEMATOCRIT % 29 0* 30 1*   PLATELETS Thousands/uL 323 374   NEUTROS PCT %  --  82*   MONOS PCT %  --  4      Results from last 7 days   Lab Units 09/09/21  0610 09/08/21  0447   POTASSIUM mmol/L 3 4* 3 1*   CHLORIDE mmol/L 106 102   CO2 mmol/L 29 27   BUN mg/dL 19 18   CREATININE mg/dL 0 61 1 08   CALCIUM mg/dL 7 9* 8 8   ALK PHOS U/L  --  76 9   ALT U/L  --  16   AST U/L  --  11*     Results from last 7 days   Lab Units 09/08/21  0447   MAGNESIUM mg/dL 1 5*          Results from last 7 days   Lab Units 09/09/21  0610 09/08/21  2350 09/08/21  1524 09/08/21  0448   INR   --   --   --  1 21*   PTT seconds 75* 89* >210* 31       IMAGING & DIAGNOSTIC TESTING Radiology Results: I have personally reviewed pertinent reports  No results found  Other Diagnostic Testing: I have personally reviewed pertinent reports  ACTIVE MEDICATIONS     Current Facility-Administered Medications   Medication Dose Route Frequency    acetaminophen (TYLENOL) tablet 975 mg  975 mg Oral Q6H [facility]    albuterol inhalation solution 2 5 mg  2 5 mg Nebulization Q6H PRN    atorvastatin (LIPITOR) tablet 40 mg  40 mg Oral Daily With Dinner    chlorproMAZINE (THORAZINE) tablet 100 mg  100 mg Oral HS    clopidogrel (PLAVIX) tablet 75 mg  75 mg Oral Daily    diphenhydrAMINE (BENADRYL) tablet 25 mg  25 mg Oral Q6H PRN    heparin (porcine) 25,000 units in 0 45% NaCl 250 mL infusion (premix)  3-30 Units/kg/hr (Order-Specific) Intravenous Titrated    heparin (porcine) injection 3,800 Units  3,800 Units Intravenous Q1H PRN    heparin (porcine) injection 7,600 Units  7,600 Units Intravenous Q1H PRN    HYDROmorphone (DILAUDID) injection 1 mg  1 mg Intravenous Q3H PRN    insulin glargine (LANTUS) subcutaneous injection 40 Units 0 4 mL  40 Units Subcutaneous HS    insulin lispro (HumaLOG) 100 units/mL subcutaneous injection 1-6 Units  1-6 Units Subcutaneous TID AC    insulin lispro (HumaLOG) 100 units/mL subcutaneous injection 1-6 Units  1-6 Units Subcutaneous HS    lisinopril (ZESTRIL) tablet 20 mg  20 mg Oral Daily    nicotine (NICODERM CQ) 21 mg/24 hr TD 24 hr patch 1 patch  1 patch Transdermal Daily    oxyCODONE (ROXICODONE) IR tablet 5 mg  5 mg Oral Q4H PRN    oxyCODONE (ROXICODONE) oral solution 10 mg  10 mg Oral Q4H PRN    polyethylene glycol (MIRALAX) packet 17 g  17 g Oral Daily PRN       Portions of the record may have been created with voice recognition software  Occasional wrong word or "sound a like" substitutions may have occurred due to the inherent limitations of voice recognition software    Read the chart carefully and recognize, using context, where substitutions have occurred   ==  [address]  MS4

## 2021-09-09 NOTE — RESPIRATORY THERAPY NOTE
resp care      09/09/21 7975   Respiratory Assessment   Assessment Type Assess only   General Appearance Alert; Awake   Respiratory Pattern Normal   Chest Assessment Chest expansion symmetrical   Bilateral Breath Sounds Clear;Diminished   Cough None   Resp Comments no prn indicated,no distress noted, pt appears comfortable, will continue to monitor

## 2021-09-09 NOTE — QUICK NOTE
Called by patient's nurse, Kimmy Buitrago,  that Cecilia Breaux was yelling and wanting to sign out MARIBETH  Went immediately to the room to talk with the patient  She was on the phone yelling and upon seeing me yelled that she wants to go home immediately  Tried to talk to the patient regarding why she wants to go home and she proceeded to interrupt me and yell that she does not want to talk about it and wants to sign out  Went to attain the [facility] paperwork and after returning to the room tried to explain the risk of the patient leaving against medical advise  She said she understood that she can die and "did not care"  However upon giving the paperwork to the patient to sign she started to become tearful and states she has increased pain, she wants something to help her sleep, and disclosed that she is "tired of going through multiple procedures" and feels as though is alone with no one to help her through this  She then stated that she understands she needs the IV heparin  Asked the patient to clarify if she truly wants to leave and thus have her IV heparin drip discontinued and she responded that she wants the IV heparin  Discussed with patient regarding her PTA medication at home  She states she does not take the trazodone but takes  Seroquel however only 100mg a day and not 300mg  She is agreeable to staying for continued IV heparin treatment  Will restart PTA Thorazine 100mg qHS and one time dose of Dilaudid 1mg for pain and PO benadryl for pruritis that the patient reports she developed after getting the oxycodone  Will continue to monitor

## 2021-09-09 NOTE — PROGRESS NOTES
[address] [facility]  Progress Note - Marychuy Barksdale 1985, 30 y o  female MRN: [id number]  Unit/Bed#: [facility] 312-01 Encounter: [id number]  Primary Care Provider: Ariella Dyer DO   Date and time admitted to hospital: 9/8/2021  6:48 AM    Peripheral artery disease [facility])  Assessment & Plan  29 y/o F with DM, COPD, BPD, PAD w/ L SFA PTA/stent 8/11/2021, subsequent stent thrombectomy 8/25/2021, who presents with acute Left leg pain in setting of recurrent  L SFA stent occlusion  LEAD 9/8:  L NIKKI 0 41/0/0    Plan:  Continue Heparin gtt, (Xarelto held)  Continue Plavix  Hematology consult pending for thrombotic evaluation  Continue neurovascular monitoring  Analgesia      Subjective:  Pt c/o LLE pain     Vitals:  /68 (BP Location: Left arm)   Pulse 78   Temp 98 1 °F (36 7 °C)   Resp 20   LMP 08/22/2021   SpO2 97%     I/Os:  I/O last 3 completed shifts: In: 714 [I V :664; IV Piggyback:50]  Out: -   No intake/output data recorded  Lab Results and Cultures:   Lab Results   Component Value Date    WBC 8 76 09/09/2021    HGB 10 4 (L) 09/09/2021    HCT 29 0 (L) 09/09/2021    MCV 80 (L) 09/09/2021     09/09/2021     Lab Results   Component Value Date    CALCIUM 7 9 (L) 09/09/2021    K 3 4 (L) 09/09/2021    CO2 29 09/09/2021     09/09/2021    BUN 19 09/09/2021    CREATININE 0 61 09/09/2021     Lab Results   Component Value Date    INR 1 21 (H) 09/08/2021    INR 1 01 08/23/2021    INR 0 91 08/22/2021    PROTIME 13 5 (H) 09/08/2021    PROTIME 13 3 08/23/2021    PROTIME 10 3 08/22/2021        Blood Culture:   Lab Results   Component Value Date    BLOODCX No Growth After 5 Days  08/22/2021    BLOODCX No Growth After 5 Days   08/22/2021   ,   Urinalysis:   Lab Results   Component Value Date    COLORU Yellow 08/24/2021    CLARITYU Clear 08/24/2021    SPECGRAV 1 017 08/24/2021    PHUR 5 5 08/24/2021    PHUR 7 5 08/07/2020    LEUKOCYTESUR Negative 08/24/2021    NITRITE Negative 08/24/2021    GLUCOSEU 500 (1/2%) (A) 08/24/2021    KETONESU Negative 08/24/2021    BILIRUBINUR Negative 08/24/2021    BLOODU Moderate (A) 08/24/2021   ,   Urine Culture: No results found for: URINECX,   Wound Culure: No results found for: WOUNDCULT    Medications:  Current Facility-Administered Medications   Medication Dose Route Frequency    acetaminophen (TYLENOL) tablet 975 mg  975 mg Oral Q6H [address]    albuterol inhalation solution 2 5 mg  2 5 mg Nebulization Q6H PRN    atorvastatin (LIPITOR) tablet 40 mg  40 mg Oral Daily With Dinner    chlorproMAZINE (THORAZINE) tablet 100 mg  100 mg Oral HS    clopidogrel (PLAVIX) tablet 75 mg  75 mg Oral Daily    diphenhydrAMINE (BENADRYL) tablet 25 mg  25 mg Oral Q6H PRN    heparin (porcine) 25,000 units in 0 45% NaCl 250 mL infusion (premix)  3-30 Units/kg/hr (Order-Specific) Intravenous Titrated    heparin (porcine) injection 3,800 Units  3,800 Units Intravenous Q1H PRN    heparin (porcine) injection 7,600 Units  7,600 Units Intravenous Q1H PRN    HYDROmorphone (DILAUDID) injection 1 mg  1 mg Intravenous Q3H PRN    insulin glargine (LANTUS) subcutaneous injection 40 Units 0 4 mL  40 Units Subcutaneous HS    insulin lispro (HumaLOG) 100 units/mL subcutaneous injection 1-6 Units  1-6 Units Subcutaneous TID AC    insulin lispro (HumaLOG) 100 units/mL subcutaneous injection 1-6 Units  1-6 Units Subcutaneous HS    lisinopril (ZESTRIL) tablet 20 mg  20 mg Oral Daily    nicotine (NICODERM CQ) 21 mg/24 hr TD 24 hr patch 1 patch  1 patch Transdermal Daily    oxyCODONE (ROXICODONE) IR tablet 5 mg  5 mg Oral Q4H PRN    oxyCODONE (ROXICODONE) oral solution 10 mg  10 mg Oral Q4H PRN    polyethylene glycol (MIRALAX) packet 17 g  17 g Oral Daily PRN    potassium chloride (K-DUR,KLOR-CON) CR tablet 40 mEq  40 mEq Oral Once    sodium chloride 0 9 % infusion  75 mL/hr Intravenous Continuous       Physical Exam:    General appearance: alert and oriented, in no acute distress  Lungs: clear to auscultation bilaterally  Heart: S1, S2 normal  Abdomen: soft, non-tender; bowel sounds normal; no masses,  no organomegaly  Extremities: Left foot warm, M/S grossly intact      Pulse exam:  DP:   Left: 0  PT:  Left: doppler signal      Tammy Nowak PA-C  9/9/2021

## 2021-09-09 NOTE — ASSESSMENT & PLAN NOTE
29 y/o F with DM, COPD, BPD, PAD w/ L SFA PTA/stent 8/11/2021, subsequent stent thrombectomy 8/25/2021, who presents with acute Left leg pain in setting of recurrent  L SFA stent occlusion      LEAD 9/8:  L NIKKI 0 41/0/0    Plan:  Continue Heparin gtt, (Xarelto held)  Continue Plavix  Hematology consult pending for thrombotic evaluation  Continue neurovascular monitoring  Analgesia

## 2021-09-09 NOTE — TELEPHONE ENCOUNTER
From: Keshav Yang   Sent:  3:25 PM  To: Vascular Surgery Schedulers [email or web address]  Subject: schedule left fem-pop bypass at [facility] all,  Need to start looking for time for a left femoral-popliteal bypass with GSV for Yasemin Peoples, JILLIAN 87, inpatient at [facility]      Thanks,  Nory Arana

## 2021-09-09 NOTE — CONSULTS
Consultation - Cardiology Team One  Annamarie Miller 30 y o  female MRN: [id number]  Unit/Bed#: VQSJ 638-01 Encounter: [id number]    Inpatient consult to Cardiology  Consult performed by: Sravanthi Baca PA-C  Consult ordered by: Neema Sevilla PA-C          Physician Requesting Consult: Darvin Gorman  Reason for Consult / Principal Problem:  Preoperative cardiac risk assessment    Assessment:    1  Preoperative cardiac risk assessment:  For possible left lower extremity bypass surgery  Patient reports exertional dyspnea over the last few years that has been recently worse with episodes of substernal chest discomfort that improves with rest   She has also had 2 episodes of chest pain at rest lasting 5 minutes  EKG with no acute ischemic change  2  Peripheral arterial disease: s/p left SFA stenting 8/11 with subsequent lysis and thrombectomy 8/24 for stent occlusion  Now with recurrent stent occlusion likely in the setting of medication noncompliance as was only intermittently taking Xarelto and no plavix  Hematology consulted for hypercoagulable workup  Currently on heparin infusion and high-intensity statin  3  Preserved biventricular systolic function:  EF 75%, no WMA or significant valvular abnormality on echocardiogram 08/2020     4  Essential hypertension:  Average /89 on lisinopril 20 mg daily  5  Uncontrolled Type 2 DM:  Hemoglobin A1c 8 5  Management per primary team  6  Bipolar disorder  7  Tobacco abuse:  16 pack year history  Complete smoking cessation advised  8  Poly substance abuse: Reports history of cocaine and marijuana use  Would not disclose when last used cocaine  9  Medication noncompliance      Plan/Recommendations:  · Patient will need an ischemic evaluation for her symptoms of exertional dyspnea and chest discomfort    Plan for nuclear stress test tomorrow  · No caffeine stress diet  · Will repeat echocardiogram to evaluate heart function valvular smoke 1 PPD  Family history: Mother had a murmur otherwise no details known by the patient  Echocardiogram 2020:  EF 60%, no WMA  Normal RV size and function  No significant valvular abnormality  EKG reviewed personally: 2021-sinus rhythm at a rate of 65 beats per minute with normal axis and intervals with no acute ischemic change  No significant change when compared to EKG from 2020  Telemetry reviewed personally: Not on telemetry      Review of Systems   Constitutional: Negative  Negative for chills  Cardiovascular: Positive for dyspnea on exertion  Negative for chest pain, leg swelling, near-syncope, orthopnea, palpitations, paroxysmal nocturnal dyspnea and syncope  Respiratory: Negative  Negative for cough, shortness of breath and wheezing  Endocrine: Negative  Hematologic/Lymphatic: Negative  Skin: Negative  Musculoskeletal: Positive for myalgias  Gastrointestinal: Negative  Negative for diarrhea, nausea and vomiting  Neurological: Negative for dizziness, light-headedness and weakness  Psychiatric/Behavioral: Negative  Negative for altered mental status  All other systems reviewed and are negative      Historical Information   Past Medical History:   Diagnosis Date    Asthma     Bipolar 1 disorder ([place] [address] )     COPD (chronic obstructive pulmonary disease) ([place] [address] )     Depression     Diabetes mellitus ([place] [address] )     Hypertension     Psychiatric disorder     cutting history    PTSD (post-traumatic stress disorder)     Tendonitis      Past Surgical History:   Procedure Laterality Date     SECTION      EAR SURGERY      IR LOWER EXTREMITY ANGIOGRAM  2021    IR LOWER EXTREMITY ANGIOGRAM  2021     Social History     Substance and Sexual Activity   Alcohol Use Not Currently    Alcohol/week: 1 0 standard drinks    Types: 1 Shots of liquor per week    Comment: 'I would drink whatever "     Social History     Substance and Sexual Activity Drug Use Not Currently    Types: Cocaine, Marijuana    Comment: 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     Social History     Tobacco Use   Smoking Status Current Every Day Smoker    Packs/day: 2 00    Years: 17 00    Pack years: 34 00    Types: Cigarettes   Smokeless Tobacco Never Used   Tobacco Comment    pt is down to 4 cigarettes a day      Family History:   Family History   Problem Relation Age of Onset    Hypertension Mother     Diabetes Mother     HIV Mother     Heart disease Mother     No Known Problems Father        Meds/Allergies   all current active meds have been reviewed, current meds:   Current Facility-Administered Medications   Medication Dose Route Frequency    acetaminophen (TYLENOL) tablet 975 mg  975 mg Oral Q6H [address]    albuterol inhalation solution 2 5 mg  2 5 mg Nebulization Q6H PRN    atorvastatin (LIPITOR) tablet 40 mg  40 mg Oral Daily With Dinner    chlorproMAZINE (THORAZINE) tablet 100 mg  100 mg Oral HS    clopidogrel (PLAVIX) tablet 75 mg  75 mg Oral Daily    diphenhydrAMINE (BENADRYL) tablet 25 mg  25 mg Oral Q6H PRN    heparin (porcine) 25,000 units in 0 45% NaCl 250 mL infusion (premix)  3-30 Units/kg/hr (Order-Specific) Intravenous Titrated    heparin (porcine) injection 3,800 Units  3,800 Units Intravenous Q1H PRN    heparin (porcine) injection 7,600 Units  7,600 Units Intravenous Q1H PRN    HYDROmorphone (DILAUDID) injection 1 mg  1 mg Intravenous Q3H PRN    insulin glargine (LANTUS) subcutaneous injection 40 Units 0 4 mL  40 Units Subcutaneous HS    insulin lispro (HumaLOG) 100 units/mL subcutaneous injection 1-6 Units  1-6 Units Subcutaneous TID AC    insulin lispro (HumaLOG) 100 units/mL subcutaneous injection 1-6 Units  1-6 Units Subcutaneous HS    lisinopril (ZESTRIL) tablet 20 mg  20 mg Oral Daily    nicotine (NICODERM CQ) 21 mg/24 hr TD 24 hr patch 1 patch  1 patch Transdermal Daily    oxyCODONE (ROXICODONE) IR tablet 5 mg  5 mg Oral Q4H PRN    oxyCODONE (ROXICODONE) oral solution 10 mg  10 mg Oral Q4H PRN    polyethylene glycol (MIRALAX) packet 17 g  17 g Oral Daily PRN    and PTA meds:   Prior to Admission Medications   Prescriptions Last Dose Informant Patient Reported? Taking? BD Pen Needle Micro U/F 32G X 6 MM MISC 9/7/2021 at Unknown time Self No Yes   Sig: use 1 NEEDLE to inject MEDICATION subcutaneously twice a day   Blood Glucose Monitoring Suppl (True Metrix Air Glucose Meter) w/Device KIT 9/7/2021 at Unknown time Self No Yes   Sig: use as directed   Blood Pressure Monitoring (Blood Pressure Monitor Automat) KTALYN  Self No No   Sig: Use Daily as Directed   Dulaglutide (Trulicity) 1 5 OT/0 5AI SOPN 9/7/2021 at Unknown time  No Yes   Sig: Tony Roberto   QUEtiapine (SEROquel) 300 mg tablet 9/7/2021 at Unknown time Self No Yes   Sig: take 1 tablet by mouth once daily   acetaminophen (TYLENOL) 325 mg tablet Past Month at Unknown time  No Yes   Sig: Take 2 tablets (650 mg total) by mouth every 6 (six) hours as needed for mild pain   albuterol (2 5 mg/3 mL) 0 083 % nebulizer solution 9/7/2021 at Unknown time  No Yes   Sig: TAKE 3 ML VIA NEBULIZER EVERY 6 (SIX) HOURS AS NEEDED FOR WHEEZING OR SHORTNESS OF BREATH   atorvastatin (LIPITOR) 40 mg tablet Not Taking at Unknown time Self Yes No   Patient not taking: Reported on 9/8/2021   chlorproMAZINE (THORAZINE) 100 mg tablet 9/7/2021 at Unknown time Self Yes Yes   Sig: Take 100 mg by mouth daily at bedtime   clopidogrel (PLAVIX) 75 mg tablet Past Month at Unknown time  No Yes   Sig: Take 1 tablet (75 mg total) by mouth daily   glucose blood test strip 9/7/2021 at Unknown time  No Yes   Sig: Use one strip 3 times daily for blood glucose testing  Patient has True Metrix glucometer   glucose monitoring kit (FREESTYLE) monitoring kit 9/7/2021 at Unknown time Self No Yes   Sig: Use 1 each daily Use one each daily to check blood sugars   Please refill with freestyle ed or with whichever brand is covered by insurance   hydrOXYzine HCL (ATARAX) 50 mg tablet 9/7/2021 at Unknown time Self Yes Yes   Sig: Take 50 mg by mouth 3 (three) times a day   insulin glargine (Lantus SoloStar) 100 units/mL injection pen Past Week at Unknown time  No Yes   Sig: Inject 40 Units under the skin daily   lamoTRIgine (LaMICtal) 25 mg tablet Not Taking at Unknown time  No No   Sig: TAKE ONE (1) TABLET BY MOUTH DAILY   Patient not taking: Reported on 9/8/2021   lisinopril (ZESTRIL) 10 mg tablet 9/7/2021 at Unknown time  No Yes   Sig: TAKE TWO (2) TABLETS BY MOUTH DAILY   lurasidone (LATUDA) 80 mg tablet 9/7/2021 at Unknown time  No Yes   Sig: Take 1 tablet (80 mg total) by mouth daily with breakfast   metFORMIN (GLUCOPHAGE) 1000 MG tablet 9/7/2021 at Unknown time  No Yes   Sig: TAKE ONE (1) TABLET BY MOUTH TWICE DAILY WITH FOOD   nicotine (NICODERM CQ) 21 mg/24 hr TD 24 hr patch Not Taking at Unknown time  No No   Sig: Place 1 patch on the skin daily   Patient not taking: Reported on 9/8/2021   prazosin (MINIPRESS) 1 mg capsule 9/7/2021 at Unknown time Self Yes Yes   Sig: Take 1 mg by mouth daily at bedtime   rivaroxaban (XARELTO) 15 mg tablet 9/7/2021 at Unknown time  No Yes   Sig: Take 1 tablet (15 mg total) by mouth 2 (two) times a day with meals   traZODone (DESYREL) 100 mg tablet Not Taking at Unknown time Self No No   Sig: TAKE 2 TABLETS BY MOUTH DAILY AT BEDTIME   Patient not taking: Reported on 9/8/2021      Facility-Administered Medications Last Administration Doses Remaining   lidocaine (LMX) 4 % cream None recorded         heparin (porcine), 3-30 Units/kg/hr (Order-Specific), Last Rate: 15 Units/kg/hr (09/09/21 0134)        No Known Allergies    Objective   Vitals: Blood pressure 159/78, pulse 77, temperature 98 1 °F (36 7 °C), resp  rate 14, last menstrual period 08/22/2021, SpO2 97 %, not currently breastfeeding  ,     There is no height or weight on file to calculate BMI ,     Systolic (09XNF), WBO:956 , Min:158 , Max:194 Diastolic (92QVA), YPW:72, Min:68, Max:111    Wt Readings from Last 3 Encounters:   09/08/21 97 5 kg (215 lb)   08/26/21 104 kg (229 lb 8 oz)   08/22/21 96 6 kg (213 lb)      Lab Results   Component Value Date    CREATININE 0 61 09/09/2021    CREATININE 1 08 09/08/2021    CREATININE 0 90 08/27/2021             Intake/Output Summary (Last 24 hours) at 9/9/2021 1007  Last data filed at 9/9/2021 0700  Gross per 24 hour   Intake 714 04 ml   Output 800 ml   Net -85 96 ml     Weight (last 2 days)     None        Invasive Devices     Peripheral Intravenous Line            Peripheral IV 09/08/21 Right Hand 1 day                  Physical Exam  Vitals and nursing note reviewed  Constitutional:       General: She is not in acute distress  Appearance: She is well-developed  She is obese  Comments: Lying flat in bed on RA in NAD   HENT:      Head: Normocephalic and atraumatic  Neck:      Vascular: No JVD  Cardiovascular:      Rate and Rhythm: Normal rate and regular rhythm  Heart sounds: Normal heart sounds  No murmur heard  No friction rub  Pulmonary:      Effort: Pulmonary effort is normal  No respiratory distress  Breath sounds: Normal breath sounds  No wheezing or rales  Abdominal:      General: Bowel sounds are normal  There is no distension  Palpations: Abdomen is soft  Tenderness: There is no abdominal tenderness  Musculoskeletal:         General: No tenderness  Normal range of motion  Cervical back: Normal range of motion and neck supple  Right lower leg: No edema  Left lower leg: No edema  Skin:     General: Skin is warm and dry  Findings: No erythema  Neurological:      Mental Status: She is alert and oriented to person, place, and time  Psychiatric:         Mood and Affect: Mood normal          Behavior: Behavior normal          Thought Content:  Thought content normal          Judgment: Judgment normal            LABORATORY RESULTS:      CBC with diff:   Results from last 7 days   Lab Units 21  0610 21  0448   WBC Thousand/uL 8 76 11 07*   HEMOGLOBIN g/dL 10 4* 10 9*   HEMATOCRIT % 29 0* 30 1*   MCV fL 80* 78*   PLATELETS Thousands/uL 323 374   MCH pg 28 7 28 4   MCHC g/dL 35 9 36 2   RDW % 13 5 12 8   MPV fL 10 3 10 6       CMP:  Results from last 7 days   Lab Units 21  0610 21  0447   POTASSIUM mmol/L 3 4* 3 1*   CHLORIDE mmol/L 106 102   CO2 mmol/L 29 27   BUN mg/dL 19 18   CREATININE mg/dL 0 61 1 08   CALCIUM mg/dL 7 9* 8 8   AST U/L  --  11*   ALT U/L  --  16   ALK PHOS U/L  --  76 9   EGFR ml/min/1 73sq m 137 77       BMP:  Results from last 7 days   Lab Units 21  0610 21  0447   POTASSIUM mmol/L 3 4* 3 1*   CHLORIDE mmol/L 106 102   CO2 mmol/L 29 27   BUN mg/dL 19 18   CREATININE mg/dL 0 61 1 08   CALCIUM mg/dL 7 9* 8 8          No results found for: NTBNP         Results from last 7 days   Lab Units 21  0447   MAGNESIUM mg/dL 1 5*          Results from last 7 days   Lab Units 21  0448   HEMOGLOBIN A1C % 8 5*              Results from last 7 days   Lab Units 21  0448   INR  1 21*     Lipid Profile:   No results found for: CHOL  Lab Results   Component Value Date    HDL 46 2020     Lab Results   Component Value Date    LDLCALC 111 (H) 2020     Lab Results   Component Value Date    TRIG 139 2020         Cardiac testing:   Results for orders placed during the hospital encounter of 20    Echo complete with contrast if indicated    Narrative  [place] [address], [address] [facility]  [phone]    Transthoracic Echocardiogram  2D, M-mode, Doppler, and Color Doppler    Study date:  07-Aug-2020    Patient: Shelley Bautista  MR number: [id number]  Account number: [de-identified]  : 1985  Age: 36 years  Gender: Female  Status: Outpatient  Location: Bedside  Height: 61 in  Weight: 197 6 lb  BP: 160/ 80 mmHg    Indications: Syncope      Diagnoses: R55  - Syncope and collapse    Sonographer:  Eder Fournier [facility]  Primary Physician:  Marquise Rey DO  Referring Physician:  EMILE Delgadillo  Group:  [facility]  Interpreting Physician:  Christiana Wilson DO    SUMMARY    LEFT VENTRICLE:  Systolic function was normal  Ejection fraction was estimated to be 60 %  There were no regional wall motion abnormalities  TRICUSPID VALVE:  There was trace regurgitation  HISTORY: PRIOR HISTORY: HTN, DM2, COPD, current smoker, bipolar disorder, syncope, former cocaine use  PROCEDURE: The procedure was performed at the bedside  This was a routine study  The transthoracic approach was used  The study included complete 2D imaging, M-mode, complete spectral Doppler, and color Doppler  The heart rate was 70 bpm,  at the start of the study  Echocardiographic views were limited due to poor patient compliance and poor acoustic window availability  This was a technically difficult study  LEFT VENTRICLE: Size was normal  Systolic function was normal  Ejection fraction was estimated to be 60 %  There were no regional wall motion abnormalities  Wall thickness was normal  DOPPLER: Left ventricular diastolic function parameters  were normal     RIGHT VENTRICLE: The size was normal  Systolic function was normal  Wall thickness was normal     LEFT ATRIUM: Size was normal     RIGHT ATRIUM: Size was normal     MITRAL VALVE: Valve structure was normal  There was normal leaflet separation  DOPPLER: The transmitral velocity was within the normal range  There was no evidence for stenosis  There was no regurgitation  AORTIC VALVE: The valve was trileaflet  Leaflets exhibited normal thickness and normal cuspal separation  DOPPLER: Transaortic velocity was within the normal range  There was no evidence for stenosis  There was no regurgitation  TRICUSPID VALVE: The valve structure was normal  There was normal leaflet separation   DOPPLER: The transtricuspid velocity was within the normal range  There was no evidence for stenosis  There was trace regurgitation  PULMONIC VALVE: Leaflets exhibited normal thickness, no calcification, and normal cuspal separation  DOPPLER: The transpulmonic velocity was within the normal range  There was trace regurgitation  PERICARDIUM: There was no pericardial effusion  The pericardium was normal in appearance  AORTA: The root exhibited normal size  SYSTEMIC VEINS: IVC: The inferior vena cava was normal in size and course  Respirophasic changes were normal     SYSTEM MEASUREMENT TABLES    2D  %FS: 38 86 %  AV Diam: 2 81 cm  EDV(Teich): 116 76 ml  EF(Cube): 77 15 %  EF(Teich): 69 03 %  ESV(Cube): 28 11 ml  ESV(Teich): 36 16 ml  IVSd: 0 96 cm  LA Area: 16 27 cm2  LA Diam: 4 1 cm  LVEDV MOD A4C: 150 97 ml  LVEF MOD A4C: 76 08 %  LVESV MOD A4C: 36 11 ml  LVIDd: 4 97 cm  LVIDs: 3 04 cm  LVLd A4C: 8 74 cm  LVLs A4C: 6 69 cm  LVPWd: 1 04 cm  RA Area: 15 49 cm2  RV Diam: 3 41 cm  SI(Cube): 50 47 ml/m2  SI(Teich): 42 87 ml/m2  SV MOD A4C: 114 86 ml  SV(Cube): 94 88 ml  SV(Teich): 80 6 ml    CW  AV Env  Ti: 266 17 ms  AV MaxP 67 mmHg  AV SI: 88 69 ml/m2  AV SV: 166 73 ml  AV VTI: 26 88 cm  AV Vmax: 1 56 m/s  AV Vmean: 1 01 m/s  AV meanP 6 mmHg    MM  TAPSE: 2 63 cm    PW  E': 0 13 m/s  E/E': 8 07  HR: 73 11 BPM  LVOT Env  Ti: 273 57 ms  LVOT VTI: 18 01 cm  LVOT Vmax: 1 04 m/s  LVOT Vmean: 0 66 m/s  LVOT maxP 3 mmHg  LVOT meanP 07 mmHg  MV A Kevin: 0 5 m/s  MV Dec [place]: 6 62 m/s2  MV DecT: 163 39 ms  MV E Kevin: 1 08 m/s  MV E/A Ratio: 2 17    [address] Accredited Echocardiography [facility]    Prepared and electronically signed by    Ramon Sherman DO  Signed 07-Aug-2020 16:00:40    No results found for this or any previous visit  No valid procedures specified  No results found for this or any previous visit  Imaging: I have personally reviewed pertinent reports      X-ray chest 1 view portable    Result Date: 8/23/2021  Narrative: CHEST INDICATION:   chest pain  COMPARISON:  Chest x-ray 4/20/2020 EXAM PERFORMED/VIEWS:  XR CHEST PORTABLE  Image: 1 FINDINGS:  Cardiomediastinal silhouette appears stable  The lungs are clear  No pneumothorax or pleural effusion  Osseous structures appear within normal limits for patient age  Impression: No acute cardiopulmonary disease  Workstation performed: [id number]     XR knee 1 or 2 vw left    Result Date: 8/25/2021  Narrative: LEFT KNEE INDICATION:   Left knee pain  COMPARISON: Radiographs of left tibia and fibula March 20, 2017 CT left lower extremity August 22, 2021 VIEWS:  XR KNEE 1 OR 2 VW LEFT FINDINGS: There is no acute fracture or dislocation  0 9 cm calcific fragment along the medial femoral condyle is not significantly changed since the March 20, 2017 radiograph, possible sequelae of remote injury Productive enthesopathic changes along the superior patellar pole There is no joint effusion  No significant degenerative changes  No lytic or blastic osseous lesion  Partially imaged vascular stent within the posteromedial soft tissues of the thigh Soft tissue swelling is seen to advantage on the August 22, 2021 CT  Impression: No acute osseous abnormality  Workstation performed: [id number]     IR lower extremity angiogram    Result Date: 8/25/2021  Narrative: PROCEDURE: Lower extremity angiography and interventions Procedural Personnel Attending physician(s): Dr Philip Tatum Pre-procedure diagnosis: Atherosclerosis of native arteries of left leg Post-procedure diagnosis: Same Indication: Patient with history of left SFA occlusion underwent left SFA stenting 2 weeks ago  Patient returns with thrombosed stent  Complications: No immediate complications  Impression: 1  Successful recanalization of completely occluded left SFA stent   2  Thrombectomy of the occluded stent performed using TPA PulseSpray thrombolysis, AngioJet thrombectomy, Penumbra thrombectomy  3  There remained chronic-appearing stenoses at the proximal and distal margins of the stent which were successfully angioplastied using 6 mm balloon  4  There appeared to be chronic stenoses immediately distal to the stent  The left SFA stent was extended distally into the proximal popliteal artery using 6 mm x 120 mm Viola stent  5  There remained dominant flow into the left foot through the posterior tibial artery  Peroneal artery appeared to be diminutive  Anterior tibial artery appeared chronically occluded  Plan: - Flat bed rest for 2 hours postprocedure - Monitor for improvement in left lower extremity ischemic symptoms _______________________________________________________________ PROCEDURE SUMMARY: - Arterial access with ultrasound guidance - Left lower extremity angiography - Thrombolysis and thrombectomy of occluded left SFA stent - Distal left SFA and proximal left popliteal artery stenting PROCEDURE DETAILS: Pre-procedure Consent: Informed consent for the procedure including risks, benefits and alternatives was obtained and time-out was performed prior to the procedure  Preparation: The site was prepared and draped using maximal sterile barrier technique including cutaneous antisepsis  Anesthesia/sedation Level of anesthesia/sedation: General anesthesia Anesthesia/sedation administered by: Anesthesiology Total intra-service sedation time (minutes): 180 Access Local anesthesia was administered  The vessel was sonographically evaluated and judged to be patent  Real time ultrasound was used to visualize needle entry into the vessel and a permanent image was stored  A 5 [place] sheath was placed  Vessel accessed: Right common femoral artery Access technique: Micropuncture set with 21 gauge needle Left lower extremity angiography and interventions Omni Flush catheter was used to advanced across the aortic bifurcation from the right CFA access into the left CFA   Left lower extremity arterial runoff was performed  The 5 [place] sheath in the right CFA was exchanged for a 6 [place] 45 cm sheath which was advanced into the left CFA  Patient was systemically heparinized  PulseSpray TPA thrombolysis was performed throughout the occluded left SFA stent using AngioJet device  This was followed by AngioJet thrombectomy throughout the left SFA stent  Residual clots were noted within the distal portions of the stent and into the proximal popliteal artery  Additional suction thrombectomy was performed using Penumbra Cat 6 device  There was residual chronic appearing stenoses at the proximal and distal margins of the stent  Angioplasty was performed at these areas using 6 mm conventional angioplasty balloons  There appeared to be residual chronic clot in the distal SFA/proximal popliteal artery  Decision was made to extend the stent in the left SFA distally  Viola 6 mm x 120 mm stent was deployed extending from the distal SFA into the proximal popliteal artery  The newly placed stent was postdilated using 6 mm balloon at the proximal end  Post stent placement angiogram showed widely patent stents in the left SFA  Runoff angiogram into the left foot was performed to assess for distal emboli  Closure Access site angiography performed: Yes Findings: Patent vessel with appropriate access level Arterial closure technique: Perclose Hemostasis achieved from closure technique: Yes Contrast Contrast agent: Visipaque Contrast volume (mL): 150 Radiation Dose Fluoroscopy time (minutes): 52 3  Reference air kerma (mGy): 199 Additional Details Estimated blood loss (mL): 350 Attestation Signer name: Henry Ross I attest that I was present for the entire procedure  I reviewed the stored images and agree with the report as written  Workstation performed: [id number]     IR lower extremity angiogram    Result Date: 8/11/2021  Narrative: PROCEDURE: Ultrasound-guided right femoral artery access   Abdominal aortogram and pelvic arteriogram  Left lower extremity arteriogram  Left superficial femoral artery balloon angioplasty and stenting x2  Post stent placement left lower extremity angiogram  Right femoral artery sheathogram  Right femoral artery vascular closure device  STAFF: RALPH Timmons  CONTRAST: 105 mL Visipaque 320  FLUOROSCOPY TIME: 27 9 minutes  NUMBER OF IMAGES: Multiple  COMPLICATIONS: None  MEDICATIONS: 1% lidocaine Sedation was provided by the anesthesia service  INDICATION: Nonhealing left 5th digit ulceration  PROCEDURE: Real-time ultrasound guidance was used to access the right common femoral artery  An image was stored in PACS  A sheath was placed  A catheter was extended to the level of the renal arteries, and aortography and pelvic arteriography in multiple obliquities was performed  A wire and catheter combination was then extended into the left common femoral artery, and further arteriography of the left lower extremity was performed  Marked disease of the proximal and mid left superficial femoral artery with proximal stenosis and approximately 6 cm mid left SFA complete occlusion  There is distal reconstitution of the mid superficial femoral artery  The posterior tibial artery and peroneal artery supply two-vessel runoff to the foot  Short segment moderate disease in the mid/distal peroneal artery  The proximal anterior tibial artery is patent, however becomes occluded at the proximal/mid artery  There is no distal reconstitution of the anterior tibial artery  The short 5-Vietnamese vascular sheath was exchanged for a 6-Vietnamese by 65 cm destination sheath with the tip placed in the left common femoral artery  A 4-Vietnamese glide catheter and 035 glide advantage wire were advanced through the vascular sheath and multiple attempts to cross the occluded left superficial femoral artery were unsuccessful  A 014 glide advantage wire and Viance crossing catheter were advanced to the level of the occlusion    The catheter and wire combination were successfully advanced through the occlusion and into the distal left superficial femoral artery  Contrast injected through the catheter confirmed intraluminal position  Next the crossing catheter was removed and the 4-[place] glide catheter was advanced over the 014 wire  The 014 wire was exchanged for a 035 wire  A 4 x 200 mm  balloon was advanced over the wire and across the occlusion  Balloon angioplasty was performed for approximately 120 seconds  Post angioplasty arteriogram demonstrated improved luminal gain with multiple short segment dissections throughout the left superficial femoral artery  Next 2, 6 x 120 mm Viola stents were deployed in the proximal and mid left superficial femoral artery  The stents were dilated with a 4 mm  balloon  Post stent deployment angiography demonstrated a patent left superficial femoral artery with markedly improved luminal gain and improved flow  Two-vessel runoff was again demonstrated with persistent moderate short segment disease in the mid/distal peroneal artery  The decision to not treat the peroneal artery was made at this time as the patient was coughing and unable to hold her lower extremity still  At this point the long 6-[place] vascular sheath was exchanged for a short 6-[place] vascular sheath  A Mynx vascular closure device was deployed successfully  A sterile dressing was applied  FINDINGS: 1   Real-time ultrasound guidance showed the right common femoral artery to be anechoic and freely compressible  2   Aortography above the level of the renal arteries showed single renal arteries bilaterally  3   Arteriography of the left lower extremity showed the left common and external iliac arteries to have no significant stenosis  Visualized branches of the internal iliac artery were normal   The common femoral artery had no significant stenosis    Visualized branches of the profunda artery had no significant stenosis  The superficial femoral artery has greater than 50% stenosis of the proximal artery with approximately 6 cm complete occlusion of the mid artery  There is distal reconstitution of the mid superficial femoral artery  The popliteal artery is patent  In-line flow to the foot was provided by the posterior tibial artery and peroneal artery  Impression: 1  Technically successful crossing of 6 cm complete occlusion of the left superficial femoral artery followed by placement of 2, 6 x 120 mm Viola stents  2   Moderate short segment disease of the mid/distal peroneal artery which was not treated today as the patient was having difficulty holding still secondary to continued coughing  PLAN: The patient was loaded with 300 mg Plavix  A prescription for 75 mg Plavix to be taken daily was also written for the patient  The patient was instructed to begin taking the 75 mg of Plavix tomorrow  Workstation performed: [id number]     VAS lower limb arterial duplex, limited, unilateral    Result Date: 9/8/2021  Narrative:  [facility] REPORT [facility]INICAL: Indications:  Patient presents with constant left leg pain  Denies any open wounds or ulcers at this time  Patient s/p left proximal to distal SFA stent on 08-11-21  Operative History: 2021-08-11 Left prox to distal SFA stent Risk Factors The patient has history of HTN and Diabetes (IDDM)  Clinical Right Pressure: IV site  , Left Pressure:  149/ mm Hg  FINDINGS:  Segment                Right      Left                                          Waveform   Waveform    PSV (cm/s)  EDV  Post  Tibial           Triphasic  Monophasic                   Ant   Tibial            Triphasic  Monophasic                   Common Femoral Artery                                 68   14  Prox Profunda                                         87   13  Proximal Pop                                          29   15  Distal Pop                                            20   10  Dist Post Tibial                                      27   15  Dist  Ant  Tibial                                     13    8  Dist Peroneal                                         16    8     CONCLUSION:  Impression: RIGHT LOWER LIMB: There is no evidence of lower extremity arterial occlusive disease in this resting evaluation  Ankle/Brachial Index: 1 2 which is normal range  Prior was not obtained  PVR/ PPG tracings are normal  Metatarsal pressure of 169 mmHg Great toe pressure of 142 mmHg,  within the healing range  LEFT LOWER LIMB: Evaluation shows an occlusion vs high grade stenosis of the proximal to distal superficial femoral artery stent with reconstitution of the proximal popliteal artery  Ankle/Brachial Index:  0 41 which is in the ischemic range  Prior not obtained  PVR tracings are dampened  PPG signals could not be obtained  Metatarsal pressure of 0 mmHg  Prior not obtained  Great toe pressure of 0  mmHg,  within the healing range  Prior not obtained  In comparison to the study dated 08-22-21 there is no significant interval change in the disease process  Technical findings given to [address] [place] [address] Avenue: Electronically Signed by: Panchito Jeffrey on 2021-09-08 01:34:37 PM    VAS lower limb arterial duplex, limited, unilateral    Result Date: 8/24/2021  Narrative:  [facility] REPORT CLINICAL: Indications:  Patient presents with pain in her left leg with knee pain  Denies any open wounds or ulcers at this time  S/P SFA stent on 8/11/2021  Operative History: No cardiovascular surgeries Risk Factors The patient has history of HTN and Diabetes (IDDM)    FINDINGS:  Left                   Impression  PSV (cm/s)  Common Femoral Artery                      58  Prox Profunda                              80  Prox SFA - Stent       Occluded                Mid SFA - Stent        Occluded                Dist SFA - Stent       Occluded                Proximal Pop           50-75%             138 Distal Pop                                 25  Tibioperoneal                              19  Dist Post Tibial                           29  Prox  Ant  Tibial                           8  Dist  Ant  Tibial      Occluded             0  Dist Peroneal                              19     CONCLUSION: Impression: RIGHT LOWER LIMB: Left leg only with no NIKKI's  LEFT LOWER LIMB: Superficial femoral artery stent is occluded throughout with recanalization at the proximal popliteal artery with a 50-75% stenosis  Anterior tibial artery distally appears occluded  The superficial femoral artery stents are occluded  Technical findings given to Dr Gisella Lujan 17:55  SIGNATURE: Electronically Signed by: Janki Yung MD on 2021-08-24 02:18:31 PM    VAS lower limb venous duplex study, unilateral/limited    Result Date: 8/23/2021  Narrative:  [facility] REPORT CLINICAL: Indications: Patient presents with left lower extremity pain x 7 days  Operative History: No cardiovascular surgeries Risk Factors The patient has history of HTN and Diabetes (IDDM)  FINDINGS:  Left     Impression       CFV      Normal (Patent)     CONCLUSION: Impression: RIGHT LOWER LIMB LIMITED: Evaluation shows no evidence of thrombus in the common femoral vein  Doppler evaluation shows a normal response to augmentation maneuvers  LEFT LOWER LIMB: No evidence of acute or chronic deep vein thrombosis No evidence of superficial thrombophlebitis noted  Doppler evaluation shows a normal response to augmentation maneuvers  Popliteal, posterior tibial and anterior tibial arterial Doppler waveforms are monophasic  Technical findings were given to Dr Gisella Lujan 17:20  SIGNATURE: Electronically Signed by: Santhosh Bansal MD, [address] on 2021-08-23 65:61:02 PM    CT lower extremity w contrast left    Result Date: 8/23/2021  Narrative: CT left lower extremity with IV contrast INDICATION: left knee pain with swelling and drainage, occluded sfa stent  COMPARISON: None  TECHNIQUE: CT examination of the left lower extremity was performed  This examination, like all CT scans performed in the [facility], was performed utilizing techniques to minimize radiation dose exposure, including the use of iterative  reconstruction and automated exposure control software  Sagittal and coronal two dimensional reconstructed images were also submitted for interpretation  IV Contrast: 100 mL of iohexol (OMNIPAQUE)  was administered intravenously without immediate adverse reaction  Rad dose  1238 98 mGy-cm FINDINGS: OSSEOUS STRUCTURES:  No fracture, dislocation or destructive osseous lesion  VISUALIZED MUSCULATURE:  Unremarkable  SOFT TISSUES:  Skin thickening overlying the prepatellar soft tissues which demonstrates underlying focal subcutaneous stranding (series 2, image 191)  No discrete fluid collection to suggest drainable abscess  No subcutaneous emphysema  The patellar tendon is not thickened  No suprapatellar joint effusion  OTHER PERTINENT FINDINGS:  There is an occluded left superficial femoral artery stent  A small amount of nonocclusive thrombus is also seen within the distal superficial femoral artery distal to the stent (series 2, image 147)  Although the study is not tailored to evaluate the arterial system, the popliteal, posterior tibial, and peroneal arteries are patent  The anterior tibial artery does not opacify past the mid calf  Impression: 1  Skin thickening overlying the prepatellar soft tissues which demonstrates underlying focal subcutaneous stranding which may be due to cellulitis or early prepatellar bursitis  No evidence of discrete fluid collection to suggest drainable abscess  No evidence of knee joint effusion  Workstation performed: [id number]       Counseling / Coordination of Care  Total floor / unit time spent today 45 minutes    Greater than 50% of total time was spent with the patient and / or family counseling and / or coordination of care  A description of the counseling / coordination of care: Review of history, current assessment, development of a plan  Code Status: Level 1 - Full Code    ** Please Note: Dragon 360 Dictation voice to text software may have been used in the creation of this document   **

## 2021-09-09 NOTE — PROGRESS NOTES
[address] [facility]  Progress Note - Carlos Olvera 1985, 30 y o  female MRN: [id number]  Unit/Bed#: [facility] 312-01 Encounter: [id number]  Primary Care Provider: Alex Dillon DO   Date and time admitted to hospital: 9/8/2021  6:48 AM    * Acute leg pain, left  Assessment & Plan  History of  Left leg atherosclerosis status post left SFA stent on 58/57, complicated by subsequent stent thrombosis and thrombectomy on 08/25 ,  Presenting to ED with severe left leg pain and numbness concerning for left SFA stent reocclusion most likely in the setting of  Plavix noncompliance  Pt was on PTA Xarelto and plavix  Vascular surgery  and IR following, appreciate recommendations  Continue heparin drip, may need fem-pop bypass next week  Pain management- Tylenol scheduled, Oxy 5 p r n  for moderate pain, Oxy 10 p r n  for severe pain, Dilaudid 1 mg IV q3 p r n   breakthrough pain  LA 2 7, most likely in the setting of L SFA stent occlusion    Peripheral artery disease (RUST 75 )  Assessment & Plan   History of left leg atherosclerosis status post left SFA stent complicated by thrombosis, thrombectomy on 08/25, now with reocclusion of the L  SFA stent in the setting of most likely medication noncompliance   please refer to left leg pain for the plan       Bipolar disorder [facility])  Assessment & Plan  mood currently stable   patient however with lethargy   PTA medications Thorazine 100 mg q h s , Latuda 80 mg, trazodone 200 mg,  Seroquel 300 mg daily    held PTA medications in the setting of lethargy and will continue to monitor mentation very closely  Resumed Thorazine, Latuda      Nicotine dependence  Assessment & Plan   continue nicotine replacement   tobacco cessation counseling    COPD (chronic obstructive pulmonary disease) (RUST 75 )  Assessment & Plan   unclear history of COPD   patient currently only on albuterol neb p r n  no other active inhalers noted   will continue to monitor    Type 2 diabetes mellitus with diabetic polyneuropathy, with long-term current use of insulin [facility])  Assessment & Plan  Lab Results   Component Value Date    HGBA1C 9 9 (H) 08/24/2021       Recent Labs     09/08/21  0702   POCGLU 296*       Blood Sugar Average: Last 72 hrs:  (P) 296   uncontrolled as outpatient   PTA Lantus 40 units q h s    continue Lantus at 40 units q h s    added sliding scale coverage algorithm 3    Hypertension  Assessment & Plan  Elevated BP OVN, likely due to pain, improved this morning  PTA lisinopril 20 mg, prazosin 1 mg q h s  Will increase lisinopril to 40 mg, continue prazosin qhs    VTE Pharmacologic Prophylaxis: Heparin  VTE Mechanical Prophylaxis: sequential compression device  Diet:       Diet Orders   (From admission, onward)             Start     Ordered    09/09/21 1102  Diet Cardiovascular; Stress Test (1 Meal)  Diet effective now     Question Answer Comment   Diet Type Cardiovascular    Cardiac Stress Test (1 Meal)    RD to adjust diet per protocol? Yes        09/09/21 1103                Code Status: level 1  Disposition: inpatient care    Discussed with attending physician, Dr Anjana Trejo, and [facility] team     Subjective:    patient is lying in bed, complains of left leg pain  No IV access per nursing report  PICC line consent form signed this morning  patient also wants to discuss on level of care  She agrees to have full code level 1 at this point      Objective:   Current Facility-Administered Medications   Medication Dose Route Frequency    acetaminophen (TYLENOL) tablet 975 mg  975 mg Oral Q6H [facility]    albuterol inhalation solution 2 5 mg  2 5 mg Nebulization Q6H PRN    atorvastatin (LIPITOR) tablet 40 mg  40 mg Oral Daily With Dinner    chlorproMAZINE (THORAZINE) tablet 100 mg  100 mg Oral HS    clopidogrel (PLAVIX) tablet 75 mg  75 mg Oral Daily    diphenhydrAMINE (BENADRYL) tablet 25 mg  25 mg Oral Q6H PRN    heparin (porcine) 25,000 units in 0 45% NaCl 250 mL infusion (premix)  3-30 Units/kg/hr (Order-Specific) Intravenous Titrated    heparin (porcine) injection 3,800 Units  3,800 Units Intravenous Q1H PRN    heparin (porcine) injection 7,600 Units  7,600 Units Intravenous Q1H PRN    HYDROmorphone (DILAUDID) injection 1 mg  1 mg Intravenous Q3H PRN    insulin glargine (LANTUS) subcutaneous injection 40 Units 0 4 mL  40 Units Subcutaneous HS    insulin lispro (HumaLOG) 100 units/mL subcutaneous injection 1-6 Units  1-6 Units Subcutaneous TID AC    insulin lispro (HumaLOG) 100 units/mL subcutaneous injection 1-6 Units  1-6 Units Subcutaneous HS    [START ON 9/10/2021] lisinopril (ZESTRIL) tablet 40 mg  40 mg Oral Daily    [START ON 9/10/2021] lurasidone (LATUDA) tablet 80 mg  80 mg Oral Daily With Breakfast    nicotine (NICODERM CQ) 21 mg/24 hr TD 24 hr patch 1 patch  1 patch Transdermal Daily    oxyCODONE (ROXICODONE) IR tablet 5 mg  5 mg Oral Q4H PRN    oxyCODONE (ROXICODONE) oral solution 10 mg  10 mg Oral Q4H PRN    polyethylene glycol (MIRALAX) packet 17 g  17 g Oral Daily PRN    prazosin (MINIPRESS) capsule 1 mg  1 mg Oral HS     No Known Allergies    Labs:  Results from last 7 days   Lab Units 09/09/21  0610 09/08/21  0448   WBC Thousand/uL 8 76 11 07*   HEMOGLOBIN g/dL 10 4* 10 9*   HEMATOCRIT % 29 0* 30 1*   PLATELETS Thousands/uL 323 374   NEUTROS PCT %  --  82*   MONOS PCT %  --  4     Results from last 7 days   Lab Units 09/09/21  0610 09/08/21  0447   POTASSIUM mmol/L 3 4* 3 1*   CHLORIDE mmol/L 106 102   CO2 mmol/L 29 27   BUN mg/dL 19 18   CREATININE mg/dL 0 61 1 08   CALCIUM mg/dL 7 9* 8 8   ALK PHOS U/L  --  76 9   ALT U/L  --  16   AST U/L  --  11*     Results from last 7 days   Lab Units 09/08/21  0447   MAGNESIUM mg/dL 1 5*     Lab Results   Component Value Date    PHOS 3 4 08/23/2021      Results from last 7 days   Lab Units 09/09/21  0610 09/08/21  2350 09/08/21  1524 09/08/21  0448   INR   --   --   --  1 21*   PTT seconds 75* 89* >210* 31 Results from last 7 days   Lab Units 09/08/21  0447   LACTIC ACID mmol/L 2 7*       Imaging/Other:        Vitals:  Vitals:    09/08/21 1955 09/09/21 0015 09/09/21 0200 09/09/21 0700   BP:  (!) 192/111 158/68 159/78   BP Location:   Left arm Left arm   Pulse: 92 81 78 77   Resp:  20  14   Temp:  98 1 °F (36 7 °C)     TempSrc:    Oral   SpO2: 99% 97%  97%         Intake/Output Summary (Last 24 hours) at 9/9/2021 1805  Last data filed at 9/9/2021 1245  Gross per 24 hour   Intake 886 23 ml   Output 800 ml   Net 86 23 ml       Physical Exam:   General: No acute distress  HEENT  Head: NC/AT  Eyes: Pupils equal and reactive to light  Mouth: Mucus membranes moist  Neck: No lymphadenopathy   Cardio: Normal S1 S2, regular rate and rhythm  Resp: Good respiratory effort, lungs clear to auscultation bilaterally  Abdomen: Soft, non distended, non tender to palpation, normal active bowel sounds  Extremities: No LE edema  Skin: Warm, dry, no rash  Neuro: oriented x3, 5/5 strength in UE and LE bilaterally, CN II-XII grossly intact  Psych: normal behavior and effect      Invasive Devices     Peripherally Inserted Central Catheter Line            PICC Line 46/23/37 Left Basilic <1 day                  Carlos Lopez, PGY-3  Rubens Chen

## 2021-09-10 ENCOUNTER — APPOINTMENT (INPATIENT)
Dept: NON INVASIVE DIAGNOSTICS | Facility: HOSPITAL | Age: 36
DRG: 253 | End: 2021-09-10
Payer: MEDICARE

## 2021-09-10 ENCOUNTER — APPOINTMENT (INPATIENT)
Dept: RADIOLOGY | Facility: HOSPITAL | Age: 36
DRG: 253 | End: 2021-09-10
Payer: MEDICARE

## 2021-09-10 LAB
ANION GAP SERPL CALCULATED.3IONS-SCNC: 18 MMOL/L (ref 4–13)
APTT PPP: 88 SECONDS (ref 23–37)
APTT PPP: >210 SECONDS (ref 23–37)
BUN SERPL-MCNC: 28 MG/DL (ref 5–25)
CALCIUM SERPL-MCNC: 7.9 MG/DL (ref 8.3–10.1)
CHLORIDE SERPL-SCNC: 106 MMOL/L (ref 100–108)
CO2 SERPL-SCNC: 11 MMOL/L (ref 21–32)
CREAT SERPL-MCNC: 0.57 MG/DL (ref 0.6–1.3)
GFR SERPL CREATININE-BSD FRML MDRD: 140 ML/MIN/1.73SQ M
GLUCOSE SERPL-MCNC: 112 MG/DL (ref 65–140)
GLUCOSE SERPL-MCNC: 159 MG/DL (ref 65–140)
GLUCOSE SERPL-MCNC: 190 MG/DL (ref 65–140)
GLUCOSE SERPL-MCNC: 195 MG/DL (ref 65–140)
GLUCOSE SERPL-MCNC: 93 MG/DL (ref 65–140)
POTASSIUM SERPL-SCNC: 3.6 MMOL/L (ref 3.5–5.3)
SODIUM SERPL-SCNC: 135 MMOL/L (ref 136–145)

## 2021-09-10 PROCEDURE — 93016 CV STRESS TEST SUPVJ ONLY: CPT | Performed by: INTERNAL MEDICINE

## 2021-09-10 PROCEDURE — 93306 TTE W/DOPPLER COMPLETE: CPT

## 2021-09-10 PROCEDURE — 85730 THROMBOPLASTIN TIME PARTIAL: CPT | Performed by: FAMILY MEDICINE

## 2021-09-10 PROCEDURE — 93018 CV STRESS TEST I&R ONLY: CPT | Performed by: INTERNAL MEDICINE

## 2021-09-10 PROCEDURE — 78452 HT MUSCLE IMAGE SPECT MULT: CPT | Performed by: INTERNAL MEDICINE

## 2021-09-10 PROCEDURE — 82948 REAGENT STRIP/BLOOD GLUCOSE: CPT

## 2021-09-10 PROCEDURE — 80048 BASIC METABOLIC PNL TOTAL CA: CPT | Performed by: PHYSICIAN ASSISTANT

## 2021-09-10 PROCEDURE — 93306 TTE W/DOPPLER COMPLETE: CPT | Performed by: INTERNAL MEDICINE

## 2021-09-10 PROCEDURE — NC001 PR NO CHARGE: Performed by: SURGERY

## 2021-09-10 PROCEDURE — 99232 SBSQ HOSP IP/OBS MODERATE 35: CPT | Performed by: FAMILY MEDICINE

## 2021-09-10 PROCEDURE — 94760 N-INVAS EAR/PLS OXIMETRY 1: CPT

## 2021-09-10 RX ORDER — OXYCODONE HYDROCHLORIDE 10 MG/1
10 TABLET ORAL EVERY 4 HOURS PRN
Status: DISCONTINUED | OUTPATIENT
Start: 2021-09-10 | End: 2021-09-15

## 2021-09-10 RX ORDER — AMINOPHYLLINE DIHYDRATE 25 MG/ML
INJECTION, SOLUTION INTRAVENOUS
Status: DISCONTINUED
Start: 2021-09-10 | End: 2021-09-10 | Stop reason: WASHOUT

## 2021-09-10 RX ADMIN — NICOTINE 1 PATCH: 21 PATCH, EXTENDED RELEASE TRANSDERMAL at 10:32

## 2021-09-10 RX ADMIN — CLOPIDOGREL BISULFATE 75 MG: 75 TABLET ORAL at 10:19

## 2021-09-10 RX ADMIN — ATORVASTATIN CALCIUM 40 MG: 40 TABLET, FILM COATED ORAL at 16:27

## 2021-09-10 RX ADMIN — PHENYTOIN 1 MG: 125 SUSPENSION ORAL at 06:54

## 2021-09-10 RX ADMIN — HYDROMORPHONE HYDROCHLORIDE 1 MG: 1 INJECTION, SOLUTION INTRAMUSCULAR; INTRAVENOUS; SUBCUTANEOUS at 20:15

## 2021-09-10 RX ADMIN — CHLORPROMAZINE HYDROCHLORIDE 100 MG: 25 TABLET, FILM COATED ORAL at 23:39

## 2021-09-10 RX ADMIN — CYCLOBENZAPRINE HYDROCHLORIDE 5 MG: 5 TABLET, FILM COATED ORAL at 20:15

## 2021-09-10 RX ADMIN — OXYCODONE HYDROCHLORIDE 10 MG: 10 TABLET ORAL at 23:41

## 2021-09-10 RX ADMIN — CYCLOBENZAPRINE HYDROCHLORIDE 5 MG: 5 TABLET, FILM COATED ORAL at 16:34

## 2021-09-10 RX ADMIN — OXYCODONE HYDROCHLORIDE 10 MG: 10 TABLET ORAL at 10:19

## 2021-09-10 RX ADMIN — HEPARIN SODIUM 15 UNITS/KG/HR: 10000 INJECTION, SOLUTION INTRAVENOUS at 18:23

## 2021-09-10 RX ADMIN — PHENYTOIN 1 MG: 125 SUSPENSION ORAL at 23:41

## 2021-09-10 RX ADMIN — INSULIN LISPRO 1 UNITS: 100 INJECTION, SOLUTION INTRAVENOUS; SUBCUTANEOUS at 16:37

## 2021-09-10 RX ADMIN — ACETAMINOPHEN 975 MG: 325 TABLET, FILM COATED ORAL at 23:40

## 2021-09-10 RX ADMIN — CYCLOBENZAPRINE HYDROCHLORIDE 5 MG: 5 TABLET, FILM COATED ORAL at 09:00

## 2021-09-10 RX ADMIN — INSULIN LISPRO 2 UNITS: 100 INJECTION, SOLUTION INTRAVENOUS; SUBCUTANEOUS at 23:44

## 2021-09-10 RX ADMIN — LISINOPRIL 40 MG: 20 TABLET ORAL at 10:19

## 2021-09-10 RX ADMIN — OXYCODONE HYDROCHLORIDE 10 MG: 10 TABLET ORAL at 16:26

## 2021-09-10 RX ADMIN — INSULIN GLARGINE 40 UNITS: 100 INJECTION, SOLUTION SUBCUTANEOUS at 23:45

## 2021-09-10 RX ADMIN — LURASIDONE HYDROCHLORIDE 80 MG: 80 TABLET, FILM COATED ORAL at 10:21

## 2021-09-10 RX ADMIN — ACETAMINOPHEN 975 MG: 325 TABLET, FILM COATED ORAL at 18:21

## 2021-09-10 RX ADMIN — REGADENOSON 0.4 MG: 0.08 INJECTION, SOLUTION INTRAVENOUS at 14:00

## 2021-09-10 NOTE — PROGRESS NOTES
[address] [facility]  Progress Note - Seth Olivares 1985, 30 y o  female MRN: [id number]  Unit/Bed#: [facility] 312-01 Encounter: [id number]  Primary Care Provider: Lucy Mathews DO   Date and time admitted to hospital: 9/8/2021  6:48 AM    * Acute leg pain, left  Assessment & Plan  History of  Left leg atherosclerosis status post left SFA stent on 11/37, complicated by subsequent stent thrombosis and thrombectomy on 08/25 ,  Presenting to ED with severe left leg pain and numbness concerning for left SFA stent reocclusion most likely in the setting of  Plavix noncompliance  Pt was on PTA Xarelto and Plavix  Vascular surgery  and IR following, appreciate recommendations  Continue heparin drip, may need fem-pop bypass next week  Pain management- Tylenol scheduled, Oxy 5 p r n  for moderate pain, Oxy 10 p r n  for severe pain, Dilaudid 1 mg IV q3 p r n   breakthrough pain, Gabapentin 300 QRS, Cyclobenzaprine q8 prn  Peripheral artery disease [facility])  Assessment & Plan   History of left leg atherosclerosis status post left SFA stent complicated by thrombosis, thrombectomy on 08/25, now with reocclusion of the L  SFA stent in the setting of most likely medication noncompliance   please refer to left leg pain for the plan       Bipolar disorder [facility])  Assessment & Plan  mood currently stable, patient however with lethargy  PTA medications Thorazine 100 mg q h s , Latuda 80 mg, trazodone 200 mg,  Seroquel 300 mg daily   Resumed Thorazine, Latuda    continue to monitor mentation very closely    Nicotine dependence  Assessment & Plan   continue nicotine replacement   tobacco cessation counseling    COPD (chronic obstructive pulmonary disease) ([facility] [address] )  Assessment & Plan   unclear history of COPD   patient currently only on albuterol neb p r n  no other active inhalers noted   will continue to monitor    Type 2 diabetes mellitus with diabetic polyneuropathy, with long-term current use of insulin [facility])  Assessment & Plan  Lab Results   Component Value Date    HGBA1C 8 5 (H) 09/08/2021       Recent Labs     09/09/21  1655 09/09/21  2152 09/10/21  0816 09/10/21  1207   POCGLU 141* 185* 93 195*       Blood Sugar Average: Last 72 hrs:  (P) 180 6   uncontrolled as outpatient   PTA Lantus 40 units q h s  Patient received 40 units Lantus and 3 units lispro over 24 hours  Continue current regimen with Lantus at 40 units q h s and sliding scale coverage algorithm 3    Hypertension  Assessment & Plan  BP stable  PTA lisinopril 20 mg, prazosin 1 mg q h s  Lisinopril was increased to 40 mg yesterday due to elevated BP overnight  Continue lisinopril 40 mg, continue prazosin qhs    VTE Pharmacologic Prophylaxis: Heparin  VTE Mechanical Prophylaxis: foot pump applied  Diet:       Diet Orders   (From admission, onward)             Start     Ordered    09/09/21 2038  Diet Cardiovascular; Stress Test (1 Meal)  Diet effective now     Comments: No caffeine due to future stress test   Question Answer Comment   Diet Type Cardiovascular    Cardiac Stress Test (1 Meal)    RD to adjust diet per protocol? Yes        09/09/21 2037              Code Status:  Level 1  Disposition:  Inpatient care    Discussed with attending physician, Dr Arceo, and  FM team     Subjective:   31 yo female with a history of Bipolar disorder, smoking and drug use presenting with worsening left leg pain and claudication secondary to thrombosis of SFA stent  She is lying in bed, still complains of leg pain      Objective:   Current Facility-Administered Medications   Medication Dose Route Frequency    acetaminophen (TYLENOL) tablet 975 mg  975 mg Oral Q6H [facility] & halfway    albuterol inhalation solution 2 5 mg  2 5 mg Nebulization Q6H PRN    atorvastatin (LIPITOR) tablet 40 mg  40 mg Oral Daily With Dinner    chlorproMAZINE (THORAZINE) tablet 100 mg  100 mg Oral HS    clopidogrel (PLAVIX) tablet 75 mg  75 mg Oral Daily    cyclobenzaprine (FLEXERIL) tablet 5 mg  5 mg Oral TID    diphenhydrAMINE (BENADRYL) tablet 25 mg  25 mg Oral Q6H PRN    heparin (porcine) 25,000 units in 0 45% NaCl 250 mL infusion (premix)  3-30 Units/kg/hr (Order-Specific) Intravenous Titrated    heparin (porcine) injection 3,800 Units  3,800 Units Intravenous Q1H PRN    heparin (porcine) injection 7,600 Units  7,600 Units Intravenous Q1H PRN    HYDROmorphone (DILAUDID) injection 1 mg  1 mg Intravenous Q3H PRN    insulin glargine (LANTUS) subcutaneous injection 40 Units 0 4 mL  40 Units Subcutaneous HS    insulin lispro (HumaLOG) 100 units/mL subcutaneous injection 1-6 Units  1-6 Units Subcutaneous TID AC    insulin lispro (HumaLOG) 100 units/mL subcutaneous injection 1-6 Units  1-6 Units Subcutaneous HS    lisinopril (ZESTRIL) tablet 40 mg  40 mg Oral Daily    lurasidone (LATUDA) tablet 80 mg  80 mg Oral Daily With Breakfast    nicotine (NICODERM CQ) 21 mg/24 hr TD 24 hr patch 1 patch  1 patch Transdermal Daily    oxyCODONE (ROXICODONE) immediate release tablet 10 mg  10 mg Oral Q4H PRN    oxyCODONE (ROXICODONE) IR tablet 5 mg  5 mg Oral Q4H PRN    polyethylene glycol (MIRALAX) packet 17 g  17 g Oral Daily PRN    prazosin (MINIPRESS) capsule 1 mg  1 mg Oral HS     No Known Allergies    Labs:  Results from last 7 days   Lab Units 09/09/21  0610 09/08/21  0448   WBC Thousand/uL 8 76 11 07*   HEMOGLOBIN g/dL 10 4* 10 9*   HEMATOCRIT % 29 0* 30 1*   PLATELETS Thousands/uL 323 374   NEUTROS PCT %  --  82*   MONOS PCT %  --  4     Results from last 7 days   Lab Units 09/10/21  0533 09/09/21  0610 09/08/21  0447   POTASSIUM mmol/L 3 6 3 4* 3 1*   CHLORIDE mmol/L 106 106 102   CO2 mmol/L 11* 29 27   BUN mg/dL 28* 19 18   CREATININE mg/dL 0 57* 0 61 1 08   CALCIUM mg/dL 7 9* 7 9* 8 8   ALK PHOS U/L  --   --  76 9   ALT U/L  --   --  16   AST U/L  --   --  11*     Results from last 7 days   Lab Units 09/08/21  0447   MAGNESIUM mg/dL 1 5*     Lab Results   Component Value Date    PHOS 3 4 08/23/2021      Results from last 7 days   Lab Units 09/10/21  1010 09/10/21  0715 09/09/21  0610 09/08/21  0448   INR   --   --   --  1 21*   PTT seconds 88* >210* 75* 31     Results from last 7 days   Lab Units 09/08/21  0447   LACTIC ACID mmol/L 2 7*       Imaging/Other:        Vitals:  Vitals:    09/09/21 0200 09/09/21 0700 09/09/21 2330 09/10/21 1019   BP: 158/68 159/78 139/79 144/76   BP Location: Left arm Left arm Left arm Left arm   Pulse: 78 77 81 74   Resp:  14 16 16   Temp:   97 6 °F (36 4 °C) 97 9 °F (36 6 °C)   TempSrc:  Oral Oral Oral   SpO2:  97% 100% 98%       No intake or output data in the 24 hours ending 09/10/21 1612    Physical Exam:   General: No acute distress  HEENT  Head: NC/AT  Eyes: Pupils equal and reactive to light  Mouth: Mucus membranes moist  Neck: No lymphadenopathy   Cardio: Normal S1 S2, regular rate and rhythm  Resp: Good respiratory effort, lungs clear to auscultation bilaterally  Abdomen: Soft, non distended, non tender to palpation, normal active bowel sounds  Extremities: No LE edema  Skin: Warm, dry, no rash  Neuro: oriented x3, 5/5 strength in UE and LE bilaterally, CN II-XII grossly intact  Psych: normal behavior and affect      Invasive Devices     Peripherally Inserted Central Catheter Line            PICC Line 38/39/21 Left Basilic 1 day

## 2021-09-10 NOTE — PROGRESS NOTES
[address] [facility]  Progress Note - Ingrid Conner 1985, 30 y o  female MRN: [id number]  Unit/Bed#: [facility] 312-01 Encounter: [id number]  Primary Care Provider: Radha Phillips DO   Date and time admitted to hospital: 9/8/2021  6:48 AM    Peripheral artery disease [facility])  Assessment & Plan  31 y/o F with DM, COPD, BPD, PAD w/ L SFA PTA/stent 8/11/2021, subsequent stent thrombectomy 8/25/2021, who presents with acute Left leg pain in setting of recurrent  L SFA stent occlusion  LEAD 9/8:  L NIKKI 0 41/0/0    Plan:  Continue Heparin gtt, (Xarelto held)  Continue Plavix  Awaiting stress test and echo results for preop risk stratification  Continue neurovascular monitoring  Analgesia            Vitals:  /79 (BP Location: Left arm)   Pulse 81   Temp 97 6 °F (36 4 °C) (Oral)   Resp 16   LMP 08/22/2021   SpO2 100%     I/Os:  I/O last 3 completed shifts: In: 257 4 [I V :257 4]  Out: 800 [Urine:800]  No intake/output data recorded  Lab Results and Cultures:   CBC with diff: Lab Results   Component Value Date    WBC 8 76 09/09/2021    HGB 10 4 (L) 09/09/2021    HCT 29 0 (L) 09/09/2021    MCV 80 (L) 09/09/2021     09/09/2021    MCH 28 7 09/09/2021    MCHC 35 9 09/09/2021    RDW 13 5 09/09/2021    MPV 10 3 09/09/2021    NRBC 0 08/27/2021   ,   BMP/CMP:  Lab Results   Component Value Date    SODIUM 135 (L) 09/10/2021    K 3 6 09/10/2021     09/10/2021    CO2 11 (L) 09/10/2021    BUN 28 (H) 09/10/2021    CREATININE 0 57 (L) 09/10/2021    CALCIUM 7 9 (L) 09/10/2021    AST 11 (L) 09/08/2021    ALT 16 09/08/2021    ALKPHOS 76 9 09/08/2021    EGFR 140 09/10/2021   ,   Lipid Panel: No results found for: CHOL,   Coags:   Lab Results   Component Value Date    PTT 75 (H) 09/09/2021    INR 1 21 (H) 09/08/2021   ,     Blood Culture:   Lab Results   Component Value Date    BLOODCX No Growth After 5 Days  08/22/2021    BLOODCX No Growth After 5 Days   08/22/2021   , Urinalysis: Lab Results   Component Value Date    COLORU Yellow 08/24/2021    CLARITYU Clear 08/24/2021    SPECGRAV 1 017 08/24/2021    PHUR 5 5 08/24/2021    PHUR 7 5 08/07/2020    LEUKOCYTESUR Negative 08/24/2021    NITRITE Negative 08/24/2021    GLUCOSEU 500 (1/2%) (A) 08/24/2021    KETONESU Negative 08/24/2021    BILIRUBINUR Negative 08/24/2021    BLOODU Moderate (A) 08/24/2021   ,   Urine Culture: No results found for: URINECX,   Wound Culure: No results found for: WOUNDCULT    Medications:  Current Facility-Administered Medications   Medication Dose Route Frequency    acetaminophen (TYLENOL) tablet 975 mg  975 mg Oral Q6H [address]    albuterol inhalation solution 2 5 mg  2 5 mg Nebulization Q6H PRN    atorvastatin (LIPITOR) tablet 40 mg  40 mg Oral Daily With Dinner    chlorproMAZINE (THORAZINE) tablet 100 mg  100 mg Oral HS    clopidogrel (PLAVIX) tablet 75 mg  75 mg Oral Daily    cyclobenzaprine (FLEXERIL) tablet 5 mg  5 mg Oral TID    diphenhydrAMINE (BENADRYL) tablet 25 mg  25 mg Oral Q6H PRN    heparin (porcine) 25,000 units in 0 45% NaCl 250 mL infusion (premix)  3-30 Units/kg/hr (Order-Specific) Intravenous Titrated    heparin (porcine) injection 3,800 Units  3,800 Units Intravenous Q1H PRN    heparin (porcine) injection 7,600 Units  7,600 Units Intravenous Q1H PRN    HYDROmorphone (DILAUDID) injection 1 mg  1 mg Intravenous Q3H PRN    insulin glargine (LANTUS) subcutaneous injection 40 Units 0 4 mL  40 Units Subcutaneous HS    insulin lispro (HumaLOG) 100 units/mL subcutaneous injection 1-6 Units  1-6 Units Subcutaneous TID AC    insulin lispro (HumaLOG) 100 units/mL subcutaneous injection 1-6 Units  1-6 Units Subcutaneous HS    lisinopril (ZESTRIL) tablet 40 mg  40 mg Oral Daily    lurasidone (LATUDA) tablet 80 mg  80 mg Oral Daily With Breakfast    nicotine (NICODERM CQ) 21 mg/24 hr TD 24 hr patch 1 patch  1 patch Transdermal Daily    oxyCODONE (ROXICODONE) IR tablet 5 mg  5 mg Oral Q4H PRN    oxyCODONE (ROXICODONE) oral solution 10 mg  10 mg Oral Q4H PRN    polyethylene glycol (MIRALAX) packet 17 g  17 g Oral Daily PRN    prazosin (MINIPRESS) capsule 1 mg  1 mg Oral HS           Physical Exam:  NAD, alert and oriented x3  Normocephalic, atraumatic  MMM, EOMI, PERRLA  Norm resp effort on RA  RRR  Abd soft, NT/ND  No calf tenderness or peripheral edema  Dopp L PT, motor/sensation intact  Motor/sensation intact in distal extremities  CN grossly intact  -rash/lesions      Monse Simon MD  9/10/2021

## 2021-09-10 NOTE — PLAN OF CARE
Problem: Nutrition/Hydration-ADULT  Goal: Nutrient/Hydration intake appropriate for improving, restoring or maintaining nutritional needs  Description: Monitor and assess patient's nutrition/hydration status for malnutrition  Collaborate with interdisciplinary team and initiate plan and interventions as ordered  Monitor patient's weight and dietary intake as ordered or per policy  Utilize nutrition screening tool and intervene as necessary  Determine patient's food preferences and provide high-protein, high-caloric foods as appropriate       INTERVENTIONS:  - Monitor oral intake, urinary output, labs, and treatment plans  - Assess nutrition and hydration status and recommend course of action  - Evaluate amount of meals eaten  - Assist patient with eating if necessary   - Allow adequate time for meals  - Recommend/ encourage appropriate diets, oral nutritional supplements, and vitamin/mineral supplements  - Order, calculate, and assess calorie counts as needed  - Recommend, monitor, and adjust tube feedings and TPN/PPN based on assessed needs  - Assess need for intravenous fluids  - Provide specific nutrition/hydration education as appropriate  - Include patient/family/caregiver in decisions related to nutrition  Outcome: Progressing     Problem: Prexisting or High Potential for Compromised Skin Integrity  Goal: Skin integrity is maintained or improved  Description: INTERVENTIONS:  - Identify patients at risk for skin breakdown  - Assess and monitor skin integrity  - Assess and monitor nutrition and hydration status  - Monitor labs   - Assess for incontinence   - Turn and reposition patient  - Assist with mobility/ambulation  - Relieve pressure over bony prominences  - Avoid friction and shearing  - Provide appropriate hygiene as needed including keeping skin clean and dry  - Evaluate need for skin moisturizer/barrier cream  - Collaborate with interdisciplinary team   - Patient/family teaching  - Consider wound care consult   Outcome: Progressing     Problem: Potential for Falls  Goal: Patient will remain free of falls  Description: INTERVENTIONS:  - Educate patient/family on patient safety including physical limitations  - Instruct patient to call for assistance with activity   - Consult OT/PT to assist with strengthening/mobility   - Keep Call bell within reach  - Keep bed low and locked with side rails adjusted as appropriate  - Keep care items and personal belongings within reach  - Initiate and maintain comfort rounds  - Make Fall Risk Sign visible to staff  - Apply yellow socks and bracelet for high fall risk patients  - Consider moving patient to room near nurses station  Outcome: Progressing     Problem: Knowledge Deficit  Goal: Patient/family/caregiver demonstrates understanding of disease process, treatment plan, medications, and discharge instructions  Description: Complete learning assessment and assess knowledge base    Interventions:  - Provide teaching at level of understanding  - Provide teaching via preferred learning methods  Outcome: Progressing

## 2021-09-10 NOTE — ASSESSMENT & PLAN NOTE
Lab Results   Component Value Date    HGBA1C 8 5 (H) 09/08/2021       Recent Labs     09/09/21  1655 09/09/21  2152 09/10/21  0816 09/10/21  1207   POCGLU 141* 185* 93 195*       Blood Sugar Average: Last 72 hrs:  (P) 180 6   uncontrolled as outpatient   PTA Lantus 40 units q h s    Patient received 40 units Lantus and 3 units lispro over 24 hours  Continue current regimen with Lantus at 40 units q h s and sliding scale coverage algorithm 3

## 2021-09-10 NOTE — CASE MANAGEMENT
Met with pt, explained CM program/CM role  LOS-1  Bundle-no  Unplanned readmission color-yellow  30 day readmission-yes  Resides alone, apartment, elevator to reach  I PTA for ADL's/ambulation, disabled  [facility]P [facility]  Denies HC/DME  States IP medical rehab  Laith  Hx Bipolar, PTSD, [address] [facility] yrs ago  Hx D&A abuse, last use 2013      CM reviewed d/c planning process including the following: identifying help at home, patient preference for d/c planning needs, Discharge Lounge, [facility] Meds to Bed program, availability of treatment team to discuss questions or concerns patient and/or family may have regarding understanding medications and recognizing signs and symptoms once discharged  CM also encouraged patient to follow up with all recommended appointments after discharge  Patient advised of importance for patient and family to participate in managing patients medical well being  Patient/caregiver received discharge checklist  Content reviewed  Patient/caregiver encouraged to participate in discharge plan of care prior to discharge home

## 2021-09-10 NOTE — ASSESSMENT & PLAN NOTE
29 y/o F with DM, COPD, BPD, PAD w/ L SFA PTA/stent 8/11/2021, subsequent stent thrombectomy 8/25/2021, who presents with acute Left leg pain in setting of recurrent  L SFA stent occlusion      LEAD 9/8:  L NIKKI 0 41/0/0    Plan:  Continue Heparin gtt, (Xarelto held)  Continue Plavix  Awaiting stress test and echo results for preop risk stratification  Continue neurovascular monitoring  Analgesia

## 2021-09-10 NOTE — ASSESSMENT & PLAN NOTE
BP stable  PTA lisinopril 20 mg, prazosin 1 mg q h s  Lisinopril was increased to 40 mg yesterday due to elevated BP overnight    Continue lisinopril 40 mg, continue prazosin qhs

## 2021-09-10 NOTE — ASSESSMENT & PLAN NOTE
mood currently stable, patient however with lethargy  PTA medications Thorazine 100 mg q h s , Latuda 80 mg, trazodone 200 mg,  Seroquel 300 mg daily   Resumed Thorazine, Latuda    continue to monitor mentation very closely

## 2021-09-10 NOTE — PROGRESS NOTES
PTT this am drawn from PICC line >210 - redraw ordered and therapeutic at 88- will continue Heparin gtt per protocol

## 2021-09-10 NOTE — QUICK NOTE
Patient off floor at testing today  Reviewed test results, there was a reversible defect of the basal-mid anterior wall  EF was normal  Echo similarly with normal EF  Distribution of the perfusion defect does not really correlate physiologically with a coronary distribution, may be shifting breast artifact  There is no high risk lesion, so I am not inclined to recommend a cath prior to her surgery, particularly in the setting of medication issues in the past   Surgery is planned for Tuesday  Will review in detail tomorrow

## 2021-09-10 NOTE — ASSESSMENT & PLAN NOTE
History of  Left leg atherosclerosis status post left SFA stent on 35/87, complicated by subsequent stent thrombosis and thrombectomy on 08/25 ,  Presenting to ED with severe left leg pain and numbness concerning for left SFA stent reocclusion most likely in the setting of  Plavix noncompliance  Pt was on PTA Xarelto and Plavix  Vascular surgery  and IR following, appreciate recommendations  Continue heparin drip, may need fem-pop bypass next week  Pain management- Tylenol scheduled, Oxy 5 p r n  for moderate pain, Oxy 10 p r n  for severe pain, Dilaudid 1 mg IV q3 p r n   breakthrough pain, Gabapentin 300 QRS, Cyclobenzaprine q8 prn

## 2021-09-11 LAB
ANION GAP SERPL CALCULATED.3IONS-SCNC: 4 MMOL/L (ref 4–13)
APTT PPP: 60 SECONDS (ref 23–37)
APTT PPP: 70 SECONDS (ref 23–37)
APTT PPP: 99 SECONDS (ref 23–37)
BUN SERPL-MCNC: 22 MG/DL (ref 5–25)
CALCIUM SERPL-MCNC: 8.2 MG/DL (ref 8.3–10.1)
CHLORIDE SERPL-SCNC: 105 MMOL/L (ref 100–108)
CO2 SERPL-SCNC: 26 MMOL/L (ref 21–32)
CREAT SERPL-MCNC: 0.74 MG/DL (ref 0.6–1.3)
ERYTHROCYTE [DISTWIDTH] IN BLOOD BY AUTOMATED COUNT: 13.2 % (ref 11.6–15.1)
GFR SERPL CREATININE-BSD FRML MDRD: 122 ML/MIN/1.73SQ M
GLUCOSE SERPL-MCNC: 121 MG/DL (ref 65–140)
GLUCOSE SERPL-MCNC: 203 MG/DL (ref 65–140)
GLUCOSE SERPL-MCNC: 231 MG/DL (ref 65–140)
GLUCOSE SERPL-MCNC: 298 MG/DL (ref 65–140)
HCT VFR BLD AUTO: 30 % (ref 34.8–46.1)
HGB BLD-MCNC: 10.5 G/DL (ref 11.5–15.4)
MCH RBC QN AUTO: 28.3 PG (ref 26.8–34.3)
MCHC RBC AUTO-ENTMCNC: 35 G/DL (ref 31.4–37.4)
MCV RBC AUTO: 81 FL (ref 82–98)
PLATELET # BLD AUTO: 299 THOUSANDS/UL (ref 149–390)
PMV BLD AUTO: 10.5 FL (ref 8.9–12.7)
POTASSIUM SERPL-SCNC: 3.6 MMOL/L (ref 3.5–5.3)
RBC # BLD AUTO: 3.71 MILLION/UL (ref 3.81–5.12)
SODIUM SERPL-SCNC: 135 MMOL/L (ref 136–145)
WBC # BLD AUTO: 7.28 THOUSAND/UL (ref 4.31–10.16)

## 2021-09-11 PROCEDURE — 85027 COMPLETE CBC AUTOMATED: CPT

## 2021-09-11 PROCEDURE — 99232 SBSQ HOSP IP/OBS MODERATE 35: CPT | Performed by: FAMILY MEDICINE

## 2021-09-11 PROCEDURE — 85730 THROMBOPLASTIN TIME PARTIAL: CPT | Performed by: FAMILY MEDICINE

## 2021-09-11 PROCEDURE — 82948 REAGENT STRIP/BLOOD GLUCOSE: CPT

## 2021-09-11 PROCEDURE — 99213 OFFICE O/P EST LOW 20 MIN: CPT | Performed by: INTERNAL MEDICINE

## 2021-09-11 PROCEDURE — 80048 BASIC METABOLIC PNL TOTAL CA: CPT

## 2021-09-11 RX ADMIN — HYDROMORPHONE HYDROCHLORIDE 1 MG: 1 INJECTION, SOLUTION INTRAMUSCULAR; INTRAVENOUS; SUBCUTANEOUS at 01:14

## 2021-09-11 RX ADMIN — OXYCODONE HYDROCHLORIDE 10 MG: 10 TABLET ORAL at 19:32

## 2021-09-11 RX ADMIN — HEPARIN SODIUM 15 UNITS/KG/HR: 10000 INJECTION, SOLUTION INTRAVENOUS at 17:50

## 2021-09-11 RX ADMIN — CYCLOBENZAPRINE HYDROCHLORIDE 5 MG: 5 TABLET, FILM COATED ORAL at 21:32

## 2021-09-11 RX ADMIN — PHENYTOIN 1 MG: 125 SUSPENSION ORAL at 21:32

## 2021-09-11 RX ADMIN — NICOTINE 1 PATCH: 21 PATCH, EXTENDED RELEASE TRANSDERMAL at 08:38

## 2021-09-11 RX ADMIN — ATORVASTATIN CALCIUM 40 MG: 40 TABLET, FILM COATED ORAL at 16:49

## 2021-09-11 RX ADMIN — HYDROMORPHONE HYDROCHLORIDE 1 MG: 1 INJECTION, SOLUTION INTRAMUSCULAR; INTRAVENOUS; SUBCUTANEOUS at 12:23

## 2021-09-11 RX ADMIN — HYDROMORPHONE HYDROCHLORIDE 1 MG: 1 INJECTION, SOLUTION INTRAMUSCULAR; INTRAVENOUS; SUBCUTANEOUS at 06:32

## 2021-09-11 RX ADMIN — HYDROMORPHONE HYDROCHLORIDE 1 MG: 1 INJECTION, SOLUTION INTRAMUSCULAR; INTRAVENOUS; SUBCUTANEOUS at 16:42

## 2021-09-11 RX ADMIN — ACETAMINOPHEN 975 MG: 325 TABLET, FILM COATED ORAL at 05:48

## 2021-09-11 RX ADMIN — OXYCODONE HYDROCHLORIDE 10 MG: 10 TABLET ORAL at 04:22

## 2021-09-11 RX ADMIN — CYCLOBENZAPRINE HYDROCHLORIDE 5 MG: 5 TABLET, FILM COATED ORAL at 08:37

## 2021-09-11 RX ADMIN — OXYCODONE HYDROCHLORIDE 10 MG: 10 TABLET ORAL at 14:44

## 2021-09-11 RX ADMIN — CLOPIDOGREL BISULFATE 75 MG: 75 TABLET ORAL at 08:35

## 2021-09-11 RX ADMIN — HYDROMORPHONE HYDROCHLORIDE 1 MG: 1 INJECTION, SOLUTION INTRAMUSCULAR; INTRAVENOUS; SUBCUTANEOUS at 21:31

## 2021-09-11 RX ADMIN — OXYCODONE HYDROCHLORIDE 10 MG: 10 TABLET ORAL at 08:52

## 2021-09-11 RX ADMIN — CHLORPROMAZINE HYDROCHLORIDE 100 MG: 25 TABLET, FILM COATED ORAL at 22:54

## 2021-09-11 RX ADMIN — INSULIN LISPRO 2 UNITS: 100 INJECTION, SOLUTION INTRAVENOUS; SUBCUTANEOUS at 12:22

## 2021-09-11 RX ADMIN — INSULIN GLARGINE 40 UNITS: 100 INJECTION, SOLUTION SUBCUTANEOUS at 21:33

## 2021-09-11 RX ADMIN — INSULIN LISPRO 3 UNITS: 100 INJECTION, SOLUTION INTRAVENOUS; SUBCUTANEOUS at 05:51

## 2021-09-11 RX ADMIN — LISINOPRIL 40 MG: 20 TABLET ORAL at 08:35

## 2021-09-11 RX ADMIN — LURASIDONE HYDROCHLORIDE 80 MG: 80 TABLET, FILM COATED ORAL at 08:37

## 2021-09-11 RX ADMIN — ACETAMINOPHEN 975 MG: 325 TABLET, FILM COATED ORAL at 12:22

## 2021-09-11 NOTE — PROGRESS NOTES
Patients morning PTT was 99  Heparin gtt running at 15u/kg/hr without being decreased by 2 units  Enrique Farrar with Family Medicine made aware  Orders to recheck PTT at this time  Will continue to monitor

## 2021-09-11 NOTE — PROGRESS NOTES
Progress Note - Cardiology   Tank Wilkerson 30 y o  female MRN: [id number]  Unit/Bed#: [facility] 312-01 Encounter: [id number]    Assessment:  Principal Problem:    Acute leg pain, left  Active Problems:    Hypertension    Type 2 diabetes mellitus with diabetic polyneuropathy, with long-term current use of insulin (HCC)    COPD (chronic obstructive pulmonary disease) ([facility])    Nicotine dependence    Bipolar disorder ([facility] )    Peripheral artery disease ([facility] )    PAD with stent occlusion  HTN  History of drug use  DM  Needs fem pop bypass  Asked for preoperative evaluation    Plan:    Given the patient's reported symptoms and history, we did a stress test  There was a small amount of ischemia noted in the anterior wall, but I actually think this may be artifact from shifting breast  Her EF was normal  She had atypical pain  Regardless, I do not think this is a high risk abnormality even it were true, therefore, would not delay her vascular surgery  Continue with medical management with plavix, anticoagulation, lipid lowering therapy with atorvastatin  BP control with lisinopril  OK to proceed to OR  Will follow up post operatively  Subjective/Objective     Subjective:   She denies any complaints to me of shortness of breath, chest pain  Stress test results were reviewed with patient, family  Echo unremarkable  Objective:    Vitals: /62 (BP Location: Right arm)   Pulse 88   Temp (!) 97 3 °F (36 3 °C) (Oral)   Resp 16   LMP 08/22/2021   SpO2 90%   There were no vitals filed for this visit    Orthostatic Blood Pressures      Most Recent Value   Blood Pressure  136/62 filed at 09/11/2021 1100   Patient Position - Orthostatic VS  Lying filed at 09/11/2021 1100          No intake or output data in the 24 hours ending 09/11/21 1324    Physical Exam:   General appearance: alert and in no acute distress  Head: Normocephalic, without obvious abnormality, atraumatic  Neck: no carotid bruit, no JVD and supple, symmetrical, trachea midline  Lungs: clear to auscultation bilaterally  Normal air entry  Normal effort  Heart: S1, S2 normal and no S3 or S4  No murmurs  Abdomen: soft, non-tender; bowel sounds normal; no masses,  no organomegaly  Extremities: extremities normal, atraumatic, no cyanosis or edema  Skin: Skin color, texture, turgor normal  No rashes or lesions  Neurologic: Grossly normal  Alert and oriented      Medications:    Current Facility-Administered Medications:     acetaminophen (TYLENOL) tablet 975 mg, 975 mg, Oral, Q6H [facility], Mona Brandon MD, 975 mg at 09/11/21 1222    albuterol inhalation solution 2 5 mg, 2 5 mg, Nebulization, Q6H PRN, Jaxson Lay MD    atorvastatin (LIPITOR) tablet 40 mg, 40 mg, Oral, Daily With Chantel Villalba MD, 40 mg at 09/10/21 1627    chlorproMAZINE (THORAZINE) tablet 100 mg, 100 mg, Oral, HS, Angelica Schumacher, DO, 100 mg at 09/10/21 2339    clopidogrel (PLAVIX) tablet 75 mg, 75 mg, Oral, Daily, Riccardo Primrose, PA-C, 75 mg at 09/11/21 0835    cyclobenzaprine (FLEXERIL) tablet 5 mg, 5 mg, Oral, TID, Reina Huma, DO, 5 mg at 09/11/21 0837    diphenhydrAMINE (BENADRYL) tablet 25 mg, 25 mg, Oral, Q6H PRN, Reina Huma, DO, 25 mg at 09/09/21 2337    heparin (porcine) 25,000 units in 0 45% NaCl 250 mL infusion (premix), 3-30 Units/kg/hr (Order-Specific), Intravenous, Titrated, Mona Brandon MD, Last Rate: 14 3 mL/hr at 09/10/21 1823, 15 Units/kg/hr at 09/10/21 1823    heparin (porcine) injection 3,800 Units, 3,800 Units, Intravenous, Q1H PRN, Jaxson Lay MD    heparin (porcine) injection 7,600 Units, 7,600 Units, Intravenous, Q1H PRN, Jaxson Lay MD    HYDROmorphone (DILAUDID) injection 1 mg, 1 mg, Intravenous, Q3H PRN, Gary Brand MD, 1 mg at 09/11/21 1223    insulin glargine (LANTUS) subcutaneous injection 40 Units 0 4 mL, 40 Units, Subcutaneous, HS, Mona Brandon MD, 40 Units at 09/10/21 5385    insulin lispro (HumaLOG) 100 units/mL subcutaneous injection 1-6 Units, 1-6 Units, Subcutaneous, TID AC, 2 Units at 09/11/21 1222 **AND** Fingerstick Glucose (POCT), , , TID AC, Letha Barrow MD    insulin lispro (HumaLOG) 100 units/mL subcutaneous injection 1-6 Units, 1-6 Units, Subcutaneous, HS, Letha Barrow MD, 2 Units at 09/10/21 2344    lisinopril (ZESTRIL) tablet 40 mg, 40 mg, Oral, Daily, John Duncan PA-C, 40 mg at 09/11/21 0835    lurasidone (LATUDA) tablet 80 mg, 80 mg, Oral, Daily With Breakfast, Letha Barrow MD, 80 mg at 09/11/21 0837    nicotine (NICODERM CQ) 21 mg/24 hr TD 24 hr patch 1 patch, 1 patch, Transdermal, Daily, Mona Brandon MD, 1 patch at 09/11/21 0838    oxyCODONE (ROXICODONE) immediate release tablet 10 mg, 10 mg, Oral, Q4H PRN, Sandy [facility], 10 mg at 09/11/21 6297    oxyCODONE (ROXICODONE) IR tablet 5 mg, 5 mg, Oral, Q4H PRN, Mona Brandon MD, 5 mg at 09/08/21 1002    polyethylene glycol (MIRALAX) packet 17 g, 17 g, Oral, Daily PRN, Jeremy Hernandez MD    prazosin (MINIPRESS) capsule 1 mg, 1 mg, Oral, HS, Letha Barrow MD, 1 mg at 09/10/21 2341    Lab Results:      Results from last 7 days   Lab Units 09/11/21  0638 09/09/21  0610 09/08/21  0448   WBC Thousand/uL 7 28 8 76 11 07*   HEMOGLOBIN g/dL 10 5* 10 4* 10 9*   HEMATOCRIT % 30 0* 29 0* 30 1*   PLATELETS Thousands/uL 299 323 374         Results from last 7 days   Lab Units 09/11/21  0638 09/10/21  0533 09/09/21  0610 09/08/21  0447   SODIUM mmol/L 135* 135* 137 140   POTASSIUM mmol/L 3 6 3 6 3 4* 3 1*   CHLORIDE mmol/L 105 106 106 102   CO2 mmol/L 26 11* 29 27   BUN mg/dL 22 28* 19 18   CREATININE mg/dL 0 74 0 57* 0 61 1 08   CALCIUM mg/dL 8 2* 7 9* 7 9* 8 8   ALK PHOS U/L  --   --   --  76 9   ALT U/L  --   --   --  16   AST U/L  --   --   --  11*     Results from last 7 days   Lab Units 09/11/21  0638 09/10/21  1010 09/10/21  0715 09/08/21  0448   INR   --   --   --  1 21*   PTT seconds 99* 88* >210* 31     Results from last 7 days   Lab Units 09/08/21  0447   MAGNESIUM mg/dL 1 5*     Echo:  LEFT VENTRICLE:  Systolic function was normal  Ejection fraction was estimated to be 60 %  There were no regional wall motion abnormalities  Wall thickness was mildly increased  There was mild concentric hypertrophy  Left ventricular diastolic function parameters were normal      RIGHT VENTRICLE:  The size was normal   Systolic function was normal     Nuclear stress test:  SUMMARY:  -  Stress results: There was no chest pain during stress  -  ECG conclusions: The stress ECG was non-diagnostic   -  Perfusion imaging: There was a small, moderately severe, reversible myocardial perfusion defect of the basal to mid anterior wall  -  Gated SPECT: The calculated left ventricular ejection fraction was 50 %  Left ventricular ejection fraction was within normal limits by visual estimate  There was no diagnostic evidence for left ventricular regional abnormality      IMPRESSIONS: Abnormal study after pharmacologic vasodilation without reproduction of symptoms  There was a small amount of ischemia in the distribution of the left anterior descending coronary artery   Left ventricular systolic function was  normal

## 2021-09-11 NOTE — PROGRESS NOTES
[address] [facility]  Progress Note - Michael Nose 1985, 30 y o  female MRN: [id number]  Unit/Bed#: [facility] 312-01 Encounter: [id number]  Primary Care Provider: Rut Du DO   Date and time admitted to hospital: 9/8/2021  6:48 AM    * Acute leg pain, left  Assessment & Plan  History of  Left leg atherosclerosis status post left SFA stent on 40/61, complicated by subsequent stent thrombosis and thrombectomy on 08/25 ,  Presenting to ED with severe left leg pain and numbness concerning for left SFA stent reocclusion most likely in the setting of  Plavix noncompliance  Pt was on PTA Xarelto and Plavix  Vascular surgery  and IR following, appreciate recommendations  Patient cleared by Cardiology for surgery  Continue heparin drip, plans for at femoral bypass scheduled on 9/14  Pain management- Tylenol scheduled, Oxy 5 p r n  for moderate pain, Oxy 10 p r n  for severe pain, Dilaudid 1 mg IV q3 p r n   breakthrough pain, Gabapentin 300 QRS, Cyclobenzaprine q8 prn  Peripheral artery disease (HCC)  Assessment & Plan   History of left leg atherosclerosis status post left SFA stent complicated by thrombosis, thrombectomy on 08/25, now with reocclusion of the L  SFA stent in the setting of most likely medication noncompliance   please refer to left leg pain for the plan       Bipolar disorder [facility])  Assessment & Plan  mood currently stable  PTA medications Thorazine 100 mg q h s , Latuda 80 mg, trazodone 200 mg,  Seroquel 300 mg daily   Resumed Thorazine, Latuda    continue to monitor mentation very closely    Nicotine dependence  Assessment & Plan   continue nicotine replacement   tobacco cessation counseling    COPD (chronic obstructive pulmonary disease) ([facility] )  Assessment & Plan   unclear history of COPD   patient currently only on albuterol neb p r n  no other active inhalers noted   will continue to monitor    Type 2 diabetes mellitus with diabetic polyneuropathy, with long-term current use of insulin [facility])  Assessment & Plan  Lab Results   Component Value Date    HGBA1C 8 5 (H) 09/08/2021       Recent Labs     09/10/21  1207 09/10/21  1632 09/10/21  2343 09/11/21  0550   POCGLU 195* 159* 190* 231*       Blood Sugar Average: Last 72 hrs:  (P) 247 [id number]   uncontrolled as outpatient   PTA Lantus 40 units q h s  Patient received 40 units Lantus and 3 units lispro over 24 hours  Continue current regimen with Lantus at 40 units q h s and sliding scale coverage algorithm 3  If patient continues to have elevated blood sugars will consider increasing Lantus  Referral to dietitian/nutritionist for dietary noncompliance    Hypertension  Assessment & Plan  BP stable  PTA lisinopril 20 mg, prazosin 1 mg q h s  Lisinopril was increased to 40 mg  due to elevated BP  Continue lisinopril 40 mg, continue prazosin qhs          Subjective/Objective     Subjective:   Patient seen and examined at bedside  No overnight events  Patient continues to endorse the pain in her legs  Reports the pain as 10/10 and states it only improved with Dilaudid  Patient also advised on dietary compliance due to elevated blood sugars  Referral placed for dietitian as patient states she did not understand what kind of diet she should take as a diabetic  Patient verbalized understanding of mentating well controlled blood sugars as she is scheduled for surgery and elevated blood sugars might prevent wound healing  Besides complaints of pain, patient denies any acute complaints or concerns  Objective:  Vitals: Blood pressure 136/62, pulse 88, temperature (!) 97 3 °F (36 3 °C), temperature source Oral, resp  rate 16, last menstrual period 08/22/2021, SpO2 90 %, not currently breastfeeding  ,There is no height or weight on file to calculate BMI      No intake or output data in the 24 hours ending 09/11/21 1412    Invasive Devices     Peripherally Inserted Central Catheter Line            PICC Line 60/64/84 Left Basilic 2 days                  Physical Exam  Constitutional:       General: She is not in acute distress  Appearance: Normal appearance  She is obese  She is not ill-appearing, toxic-appearing or diaphoretic  HENT:      Head: Normocephalic and atraumatic  Cardiovascular:      Rate and Rhythm: Normal rate and regular rhythm  Pulses: Normal pulses  Heart sounds: Normal heart sounds  Pulmonary:      Effort: Pulmonary effort is normal       Breath sounds: Normal breath sounds  Abdominal:      General: Abdomen is flat  Bowel sounds are normal  There is no distension  Palpations: Abdomen is soft  Musculoskeletal:      Comments: Tenderness to touch in lower extremities bilaterally   Skin:     General: Skin is warm and dry  Capillary Refill: Capillary refill takes less than 2 seconds  Neurological:      Mental Status: She is alert  Mental status is at baseline  Psychiatric:         Mood and Affect: Mood normal          Behavior: Behavior normal          Thought Content: Thought content normal          Lab, Imaging and other studies: I have personally reviewed pertinent reports       Results from last 7 days   Lab Units 09/11/21  0638 09/09/21  0610 09/08/21  0448   WBC Thousand/uL 7 28 8 76 11 07*   HEMOGLOBIN g/dL 10 5* 10 4* 10 9*   HEMATOCRIT % 30 0* 29 0* 30 1*   PLATELETS Thousands/uL 299 323 374   NEUTROS PCT %  --   --  82*   MONOS PCT %  --   --  4     Results from last 7 days   Lab Units 09/11/21  0638 09/10/21  0533 09/09/21  0610 09/08/21  0447   POTASSIUM mmol/L 3 6 3 6 3 4* 3 1*   CHLORIDE mmol/L 105 106 106 102   CO2 mmol/L 26 11* 29 27   BUN mg/dL 22 28* 19 18   CREATININE mg/dL 0 74 0 57* 0 61 1 08   CALCIUM mg/dL 8 2* 7 9* 7 9* 8 8   ALK PHOS U/L  --   --   --  76 9   ALT U/L  --   --   --  16   AST U/L  --   --   --  11*     Results from last 7 days   Lab Units 09/08/21  0447   MAGNESIUM mg/dL 1 5*     Lab Results   Component Value Date    PHOS 3 4 08/23/2021      Results from last 7 days   Lab Units 09/11/21  0638 09/10/21  1010 09/10/21  0715 09/08/21  0448   INR   --   --   --  1 21*   PTT seconds 99* 88* >210* 31     Results from last 7 days   Lab Units 09/08/21  0447   LACTIC ACID mmol/L 2 7*     0   Lab Value Date/Time    TROPONINI <0 03 08/22/2021 1518    TROPONINI <0 02 08/07/2020 0916    TROPONINI <0 02 08/07/2020 0527    TROPONINI <0 02 08/06/2020 2143     VTE Pharmacologic Prophylaxis: Heparin     VTE Mechanical Prophylaxis: sequential compression device

## 2021-09-11 NOTE — ASSESSMENT & PLAN NOTE
Lab Results   Component Value Date    HGBA1C 8 5 (H) 09/08/2021       Recent Labs     09/10/21  1207 09/10/21  1632 09/10/21  2343 09/11/21  0550   POCGLU 195* 159* 190* 231*       Blood Sugar Average: Last 72 hrs:  (P) 422 [id number]   uncontrolled as outpatient   PTA Lantus 40 units q h s  Patient received 40 units Lantus and 3 units lispro over 24 hours  Continue current regimen with Lantus at 40 units q h s and sliding scale coverage algorithm 3  If patient continues to have elevated blood sugars will consider increasing Lantus    Referral to dietitian/nutritionist for dietary noncompliance

## 2021-09-11 NOTE — ASSESSMENT & PLAN NOTE
History of  Left leg atherosclerosis status post left SFA stent on 17/70, complicated by subsequent stent thrombosis and thrombectomy on 08/25 ,  Presenting to ED with severe left leg pain and numbness concerning for left SFA stent reocclusion most likely in the setting of  Plavix noncompliance  Pt was on PTA Xarelto and Plavix  Vascular surgery  and IR following, appreciate recommendations  Patient cleared by Cardiology for surgery  Continue heparin drip, plans for at femoral bypass scheduled on 9/14  Pain management- Tylenol scheduled, Oxy 5 p r n  for moderate pain, Oxy 10 p r n  for severe pain, Dilaudid 1 mg IV q3 p r n   breakthrough pain, Gabapentin 300 QRS, Cyclobenzaprine q8 prn

## 2021-09-11 NOTE — ASSESSMENT & PLAN NOTE
mood currently stable  PTA medications Thorazine 100 mg q h s , Latuda 80 mg, trazodone 200 mg,  Seroquel 300 mg daily   Resumed Thorazine, Latuda    continue to monitor mentation very closely

## 2021-09-11 NOTE — ASSESSMENT & PLAN NOTE
BP stable  PTA lisinopril 20 mg, prazosin 1 mg q h s  Lisinopril was increased to 40 mg  due to elevated BP    Continue lisinopril 40 mg, continue prazosin qhs

## 2021-09-12 LAB
APTT PPP: 78 SECONDS (ref 23–37)
APTT PPP: 86 SECONDS (ref 23–37)
APTT PPP: 93 SECONDS (ref 23–37)
GLUCOSE SERPL-MCNC: 105 MG/DL (ref 65–140)
GLUCOSE SERPL-MCNC: 106 MG/DL (ref 65–140)
GLUCOSE SERPL-MCNC: 118 MG/DL (ref 65–140)

## 2021-09-12 PROCEDURE — 82948 REAGENT STRIP/BLOOD GLUCOSE: CPT

## 2021-09-12 PROCEDURE — 99232 SBSQ HOSP IP/OBS MODERATE 35: CPT | Performed by: FAMILY MEDICINE

## 2021-09-12 PROCEDURE — 85730 THROMBOPLASTIN TIME PARTIAL: CPT | Performed by: FAMILY MEDICINE

## 2021-09-12 RX ORDER — GABAPENTIN 300 MG/1
300 CAPSULE ORAL 2 TIMES DAILY
Status: DISCONTINUED | OUTPATIENT
Start: 2021-09-12 | End: 2021-09-13

## 2021-09-12 RX ADMIN — CHLORPROMAZINE HYDROCHLORIDE 100 MG: 25 TABLET, FILM COATED ORAL at 22:15

## 2021-09-12 RX ADMIN — HEPARIN SODIUM 13 UNITS/KG/HR: 10000 INJECTION, SOLUTION INTRAVENOUS at 14:48

## 2021-09-12 RX ADMIN — ACETAMINOPHEN 975 MG: 325 TABLET, FILM COATED ORAL at 06:14

## 2021-09-12 RX ADMIN — CLOPIDOGREL BISULFATE 75 MG: 75 TABLET ORAL at 08:39

## 2021-09-12 RX ADMIN — LURASIDONE HYDROCHLORIDE 80 MG: 80 TABLET, FILM COATED ORAL at 09:55

## 2021-09-12 RX ADMIN — HYDROMORPHONE HYDROCHLORIDE 1 MG: 1 INJECTION, SOLUTION INTRAMUSCULAR; INTRAVENOUS; SUBCUTANEOUS at 19:47

## 2021-09-12 RX ADMIN — ATORVASTATIN CALCIUM 40 MG: 40 TABLET, FILM COATED ORAL at 16:42

## 2021-09-12 RX ADMIN — CYCLOBENZAPRINE HYDROCHLORIDE 5 MG: 5 TABLET, FILM COATED ORAL at 16:42

## 2021-09-12 RX ADMIN — ACETAMINOPHEN 975 MG: 325 TABLET, FILM COATED ORAL at 12:54

## 2021-09-12 RX ADMIN — PHENYTOIN 1 MG: 125 SUSPENSION ORAL at 22:15

## 2021-09-12 RX ADMIN — HYDROMORPHONE HYDROCHLORIDE 1 MG: 1 INJECTION, SOLUTION INTRAMUSCULAR; INTRAVENOUS; SUBCUTANEOUS at 14:52

## 2021-09-12 RX ADMIN — HYDROMORPHONE HYDROCHLORIDE 1 MG: 1 INJECTION, SOLUTION INTRAMUSCULAR; INTRAVENOUS; SUBCUTANEOUS at 09:56

## 2021-09-12 RX ADMIN — OXYCODONE HYDROCHLORIDE 10 MG: 10 TABLET ORAL at 08:43

## 2021-09-12 RX ADMIN — OXYCODONE HYDROCHLORIDE 10 MG: 10 TABLET ORAL at 23:48

## 2021-09-12 RX ADMIN — INSULIN GLARGINE 40 UNITS: 100 INJECTION, SOLUTION SUBCUTANEOUS at 22:17

## 2021-09-12 RX ADMIN — LISINOPRIL 40 MG: 20 TABLET ORAL at 08:38

## 2021-09-12 RX ADMIN — OXYCODONE HYDROCHLORIDE 10 MG: 10 TABLET ORAL at 18:54

## 2021-09-12 RX ADMIN — OXYCODONE HYDROCHLORIDE 10 MG: 10 TABLET ORAL at 12:54

## 2021-09-12 RX ADMIN — CYCLOBENZAPRINE HYDROCHLORIDE 5 MG: 5 TABLET, FILM COATED ORAL at 08:39

## 2021-09-12 RX ADMIN — CYCLOBENZAPRINE HYDROCHLORIDE 5 MG: 5 TABLET, FILM COATED ORAL at 22:15

## 2021-09-12 RX ADMIN — NICOTINE 1 PATCH: 21 PATCH, EXTENDED RELEASE TRANSDERMAL at 08:40

## 2021-09-12 NOTE — NURSING NOTE
Pt is attempting to get transportation home for visitors but is unable to do so; she will continue to get someone to take them home in the AM

## 2021-09-12 NOTE — PROGRESS NOTES
[address] [facility]  Progress Note - Alejandro Schaffer 1985, 30 y o  female MRN: [id number]  Unit/Bed#: [facility] 368-39 Encounter: [id number]  Primary Care Provider: Dalia Drew DO   Date and time admitted to hospital: 9/8/2021  6:48 AM    * Acute leg pain, left  Assessment & Plan  History of  Left leg atherosclerosis status post left SFA stent on 08/65, complicated by subsequent stent thrombosis and thrombectomy on 08/25 ,  Presenting to ED with severe left leg pain and numbness concerning for left SFA stent reocclusion most likely in the setting of  Plavix noncompliance  Pt was on PTA Xarelto and Plavix  Vascular surgery  and IR following, appreciate recommendations  Patient cleared by Cardiology for surgery  Continue heparin drip, plans for at femoral bypass scheduled on 9/14  Pain management- Tylenol scheduled, Oxy 5 p r n  for moderate pain, Oxy 10 p r n  for severe pain, Dilaudid 1 mg IV q3 p r n   breakthrough pain, Gabapentin 300 QRS, Cyclobenzaprine q8 prn  Peripheral artery disease (HCC)  Assessment & Plan   History of left leg atherosclerosis status post left SFA stent complicated by thrombosis, thrombectomy on 08/25, now with reocclusion of the L  SFA stent in the setting of most likely medication noncompliance   please refer to left leg pain for the plan      Bipolar disorder [facility])  Assessment & Plan  mood currently stable  PTA medications Thorazine 100 mg q h s , Latuda 80 mg, trazodone 200 mg,  Seroquel 300 mg daily   Resumed Thorazine, Latuda    continue to monitor mentation very closely    Nicotine dependence  Assessment & Plan  continue nicotine replacement   tobacco cessation counseling    COPD (chronic obstructive pulmonary disease) ([facility] )  Assessment & Plan   unclear history of COPD   patient currently only on albuterol neb p r n  no other active inhalers noted   will continue to monitor    Type 2 diabetes mellitus with diabetic polyneuropathy, with long-term current use of insulin [facility])  Assessment & Plan  Lab Results   Component Value Date    HGBA1C 8 5 (H) 09/08/2021       Recent Labs     09/11/21  0550 09/11/21  1108 09/11/21  1626 09/12/21  1112   POCGLU 231* 203* 121 106     uncontrolled as outpatient   PTA Lantus 40 units q h s  Patient received 40 units Lantus and 2 units lispro over 24 hours  Continue current regimen with Lantus at 40 units q h s and sliding scale coverage algorithm 3  If patient continues to have elevated blood sugars will consider increasing Lantus  Referral to dietitian/nutritionist for dietary noncompliance    Hypertension  Assessment & Plan  Elevated /115 last night, however, recheck this morning was normal 139/85, 146/86, likely 2/2 her leg pain  PTA lisinopril 20 mg, prazosin 1 mg q h s  Lisinopril was increased to 40 mg  due to elevated BP  Continue lisinopril 40 mg, continue prazosin qhs  Controlled pain      VTE Pharmacologic Prophylaxis: Heparin  VTE Mechanical Prophylaxis: sequential compression device  Diet:       Diet Orders   (From admission, onward)             Start     Ordered    09/10/21 1624  Diet Paul/CHO Controlled; Consistent Carbohydrate Diet Level 2 (5 carb servings/75 grams CHO/meal)  Diet effective now     Comments: No caffeine due to future stress test   Question Answer Comment   Diet Type Paul/CHO Controlled    Paul/CHO Controlled Consistent Carbohydrate Diet Level 2 (5 carb servings/75 grams CHO/meal)    RD to adjust diet per protocol? Yes        09/10/21 1624                Code Status:level 1  Disposition:inpatient care    Discussed with attending physician, Dr Aly Brown, and [facility] team     Subjective:   Patient is lying in bed, denies any headaches, chest pain, shortness of breath, or palpitations  She had bowel movement last night  She states that nutrition service has talked to her about [employer] for her diabetes       Objective:   Current Facility-Administered Medications   Medication Dose Route Frequency    acetaminophen (TYLENOL) tablet 975 mg  975 mg Oral Q6H [address]    albuterol inhalation solution 2 5 mg  2 5 mg Nebulization Q6H PRN    atorvastatin (LIPITOR) tablet 40 mg  40 mg Oral Daily With Dinner    chlorproMAZINE (THORAZINE) tablet 100 mg  100 mg Oral HS    clopidogrel (PLAVIX) tablet 75 mg  75 mg Oral Daily    cyclobenzaprine (FLEXERIL) tablet 5 mg  5 mg Oral TID    diphenhydrAMINE (BENADRYL) tablet 25 mg  25 mg Oral Q6H PRN    heparin (porcine) 25,000 units in 0 45% NaCl 250 mL infusion (premix)  3-30 Units/kg/hr (Order-Specific) Intravenous Titrated    heparin (porcine) injection 3,800 Units  3,800 Units Intravenous Q1H PRN    heparin (porcine) injection 7,600 Units  7,600 Units Intravenous Q1H PRN    HYDROmorphone (DILAUDID) injection 1 mg  1 mg Intravenous Q3H PRN    insulin glargine (LANTUS) subcutaneous injection 40 Units 0 4 mL  40 Units Subcutaneous HS    insulin lispro (HumaLOG) 100 units/mL subcutaneous injection 2-12 Units  2-12 Units Subcutaneous TID AC    lisinopril (ZESTRIL) tablet 40 mg  40 mg Oral Daily    lurasidone (LATUDA) tablet 80 mg  80 mg Oral Daily With Breakfast    nicotine (NICODERM CQ) 21 mg/24 hr TD 24 hr patch 1 patch  1 patch Transdermal Daily    oxyCODONE (ROXICODONE) immediate release tablet 10 mg  10 mg Oral Q4H PRN    oxyCODONE (ROXICODONE) IR tablet 5 mg  5 mg Oral Q4H PRN    polyethylene glycol (MIRALAX) packet 17 g  17 g Oral Daily PRN    prazosin (MINIPRESS) capsule 1 mg  1 mg Oral HS     No Known Allergies    Labs:  Results from last 7 days   Lab Units 09/11/21  0638 09/09/21  0610 09/08/21  0448   WBC Thousand/uL 7 28 8 76 11 07*   HEMOGLOBIN g/dL 10 5* 10 4* 10 9*   HEMATOCRIT % 30 0* 29 0* 30 1*   PLATELETS Thousands/uL 299 323 374   NEUTROS PCT %  --   --  82*   MONOS PCT %  --   --  4     Results from last 7 days   Lab Units 09/11/21  0638 09/10/21  0533 09/09/21  0610 09/08/21  0447   POTASSIUM mmol/L 3 6 3 6 3 4* 3 1*   CHLORIDE mmol/L 105 106 106 102   CO2 mmol/L 26 11* 29 27   BUN mg/dL 22 28* 19 18   CREATININE mg/dL 0 74 0 57* 0 61 1 08   CALCIUM mg/dL 8 2* 7 9* 7 9* 8 8   ALK PHOS U/L  --   --   --  76 9   ALT U/L  --   --   --  16   AST U/L  --   --   --  11*     Results from last 7 days   Lab Units 09/08/21  0447   MAGNESIUM mg/dL 1 5*     Lab Results   Component Value Date    PHOS 3 4 08/23/2021      Results from last 7 days   Lab Units 09/12/21  0507 09/11/21  2312 09/11/21  1649 09/08/21  0448   INR   --   --   --  1 21*   PTT seconds 93* 70* 60* 31     Results from last 7 days   Lab Units 09/08/21  0447   LACTIC ACID mmol/L 2 7*       Imaging/Other:        Vitals:  Vitals:    09/12/21 1043 09/12/21 1046 09/12/21 1111 09/12/21 1301   BP:   146/86    BP Location:   Right arm    Pulse:   81    Resp:    16   Temp:   97 9 °F (36 6 °C)    TempSrc:   Oral    SpO2:   97%    Weight: 97 5 kg (214 lb 15 2 oz)      Height: 5' 1" (1 549 m) 5' 1" (1 549 m)           Intake/Output Summary (Last 24 hours) at 9/12/2021 1310  Last data filed at 9/12/2021 1301  Gross per 24 hour   Intake 3082 72 ml   Output --   Net 3082 72 ml       Physical Exam:   General: No acute distress  HEENT  Head: NC/AT  Eyes: Pupils equal and reactive to light  Mouth: Mucus membranes moist  Neck: No lymphadenopathy   Cardio: Normal S1 S2, regular rate and rhythm  Resp: Good respiratory effort, lungs clear to auscultation bilaterally  Abdomen: Soft, non distended, non tender to palpation, normal active bowel sounds  Extremities: No LE edema  Skin: Warm, dry, no rash  Neuro: oriented x3, 5/5 strength in UE and LE bilaterally, CN II-XII grossly intact  Psych: normal behavior and effect      Invasive Devices     Peripherally Inserted Central Catheter Line            PICC Line 30/06/10 Left Basilic 3 days                  Trevin Montanez, PGY-3  [address]

## 2021-09-12 NOTE — ASSESSMENT & PLAN NOTE
History of  Left leg atherosclerosis status post left SFA stent on 03/18, complicated by subsequent stent thrombosis and thrombectomy on 08/25 ,  Presenting to ED with severe left leg pain and numbness concerning for left SFA stent reocclusion most likely in the setting of  Plavix noncompliance  Pt was on PTA Xarelto and Plavix  Vascular surgery  and IR following, appreciate recommendations  Patient cleared by Cardiology for surgery  Continue heparin drip, plans for at femoral bypass scheduled on 9/14  Pain management- Tylenol scheduled, Oxy 5 p r n  for moderate pain, Oxy 10 p r n  for severe pain, Dilaudid 1 mg IV q3 p r n   breakthrough pain, Gabapentin 300 QRS, Cyclobenzaprine q8 prn

## 2021-09-12 NOTE — ASSESSMENT & PLAN NOTE
Lab Results   Component Value Date    HGBA1C 8 5 (H) 09/08/2021       Recent Labs     09/11/21  0550 09/11/21  1108 09/11/21  1626 09/12/21  1112   POCGLU 231* 203* 121 106     uncontrolled as outpatient   PTA Lantus 40 units q h s  Patient received 40 units Lantus and 2 units lispro over 24 hours  Continue current regimen with Lantus at 40 units q h s and sliding scale coverage algorithm 3  If patient continues to have elevated blood sugars will consider increasing Lantus    Referral to dietitian/nutritionist for dietary noncompliance

## 2021-09-12 NOTE — ASSESSMENT & PLAN NOTE
Elevated /115 last night, however, recheck this morning was normal 139/85, 146/86, likely 2/2 her leg pain  PTA lisinopril 20 mg, prazosin 1 mg q h s  Lisinopril was increased to 40 mg  due to elevated BP  Continue lisinopril 40 mg, continue prazosin qhs   Controlled pain

## 2021-09-13 ENCOUNTER — TELEPHONE (OUTPATIENT)
Dept: SURGICAL ONCOLOGY | Facility: CLINIC | Age: 36
End: 2021-09-13

## 2021-09-13 ENCOUNTER — ANESTHESIA EVENT (INPATIENT)
Dept: PERIOP | Facility: HOSPITAL | Age: 36
DRG: 253 | End: 2021-09-13
Payer: MEDICARE

## 2021-09-13 LAB
ABO GROUP BLD: NORMAL
APTT PPP: 76 SECONDS (ref 23–37)
BLD GP AB SCN SERPL QL: NEGATIVE
GLUCOSE SERPL-MCNC: 162 MG/DL (ref 65–140)
GLUCOSE SERPL-MCNC: 171 MG/DL (ref 65–140)
GLUCOSE SERPL-MCNC: 76 MG/DL (ref 65–140)
GLUCOSE SERPL-MCNC: 83 MG/DL (ref 65–140)
RH BLD: POSITIVE
SPECIMEN EXPIRATION DATE: NORMAL

## 2021-09-13 PROCEDURE — 86923 COMPATIBILITY TEST ELECTRIC: CPT

## 2021-09-13 PROCEDURE — 85730 THROMBOPLASTIN TIME PARTIAL: CPT | Performed by: FAMILY MEDICINE

## 2021-09-13 PROCEDURE — 99232 SBSQ HOSP IP/OBS MODERATE 35: CPT | Performed by: SURGERY

## 2021-09-13 PROCEDURE — 94760 N-INVAS EAR/PLS OXIMETRY 1: CPT

## 2021-09-13 PROCEDURE — 86850 RBC ANTIBODY SCREEN: CPT | Performed by: PHYSICIAN ASSISTANT

## 2021-09-13 PROCEDURE — 86901 BLOOD TYPING SEROLOGIC RH(D): CPT | Performed by: PHYSICIAN ASSISTANT

## 2021-09-13 PROCEDURE — 99232 SBSQ HOSP IP/OBS MODERATE 35: CPT | Performed by: FAMILY MEDICINE

## 2021-09-13 PROCEDURE — 86900 BLOOD TYPING SEROLOGIC ABO: CPT | Performed by: PHYSICIAN ASSISTANT

## 2021-09-13 PROCEDURE — 82948 REAGENT STRIP/BLOOD GLUCOSE: CPT

## 2021-09-13 RX ORDER — PRAZOSIN HYDROCHLORIDE 1 MG/1
1 CAPSULE ORAL
Status: DISCONTINUED | OUTPATIENT
Start: 2021-09-14 | End: 2021-09-17 | Stop reason: HOSPADM

## 2021-09-13 RX ORDER — INSULIN GLARGINE 100 [IU]/ML
15 INJECTION, SOLUTION SUBCUTANEOUS
Status: DISCONTINUED | OUTPATIENT
Start: 2021-09-13 | End: 2021-09-15

## 2021-09-13 RX ORDER — GABAPENTIN 300 MG/1
300 CAPSULE ORAL 3 TIMES DAILY
Status: DISCONTINUED | OUTPATIENT
Start: 2021-09-13 | End: 2021-09-17 | Stop reason: HOSPADM

## 2021-09-13 RX ORDER — SODIUM CHLORIDE 9 MG/ML
100 INJECTION, SOLUTION INTRAVENOUS CONTINUOUS
Status: DISCONTINUED | OUTPATIENT
Start: 2021-09-14 | End: 2021-09-15

## 2021-09-13 RX ORDER — CHLORHEXIDINE GLUCONATE 0.12 MG/ML
15 RINSE ORAL ONCE
Status: COMPLETED | OUTPATIENT
Start: 2021-09-14 | End: 2021-09-14

## 2021-09-13 RX ORDER — CLOPIDOGREL BISULFATE 75 MG/1
75 TABLET ORAL DAILY
Status: DISCONTINUED | OUTPATIENT
Start: 2021-09-13 | End: 2021-09-15

## 2021-09-13 RX ADMIN — CYCLOBENZAPRINE HYDROCHLORIDE 5 MG: 5 TABLET, FILM COATED ORAL at 21:02

## 2021-09-13 RX ADMIN — LISINOPRIL 40 MG: 20 TABLET ORAL at 09:01

## 2021-09-13 RX ADMIN — ACETAMINOPHEN 975 MG: 325 TABLET, FILM COATED ORAL at 17:42

## 2021-09-13 RX ADMIN — OXYCODONE HYDROCHLORIDE 10 MG: 10 TABLET ORAL at 21:10

## 2021-09-13 RX ADMIN — HYDROMORPHONE HYDROCHLORIDE 1 MG: 1 INJECTION, SOLUTION INTRAMUSCULAR; INTRAVENOUS; SUBCUTANEOUS at 03:27

## 2021-09-13 RX ADMIN — HYDROMORPHONE HYDROCHLORIDE 1 MG: 1 INJECTION, SOLUTION INTRAMUSCULAR; INTRAVENOUS; SUBCUTANEOUS at 12:01

## 2021-09-13 RX ADMIN — INSULIN GLARGINE 15 UNITS: 100 INJECTION, SOLUTION SUBCUTANEOUS at 21:03

## 2021-09-13 RX ADMIN — SODIUM CHLORIDE 100 ML/HR: 0.9 INJECTION, SOLUTION INTRAVENOUS at 23:47

## 2021-09-13 RX ADMIN — ACETAMINOPHEN 975 MG: 325 TABLET, FILM COATED ORAL at 11:58

## 2021-09-13 RX ADMIN — HYDROMORPHONE HYDROCHLORIDE 1 MG: 1 INJECTION, SOLUTION INTRAMUSCULAR; INTRAVENOUS; SUBCUTANEOUS at 17:50

## 2021-09-13 RX ADMIN — HYDROMORPHONE HYDROCHLORIDE 1 MG: 1 INJECTION, SOLUTION INTRAMUSCULAR; INTRAVENOUS; SUBCUTANEOUS at 22:28

## 2021-09-13 RX ADMIN — CYCLOBENZAPRINE HYDROCHLORIDE 5 MG: 5 TABLET, FILM COATED ORAL at 09:01

## 2021-09-13 RX ADMIN — CLOPIDOGREL BISULFATE 75 MG: 75 TABLET ORAL at 11:58

## 2021-09-13 RX ADMIN — GABAPENTIN 300 MG: 300 CAPSULE ORAL at 09:01

## 2021-09-13 RX ADMIN — OXYCODONE HYDROCHLORIDE 10 MG: 10 TABLET ORAL at 09:02

## 2021-09-13 RX ADMIN — HEPARIN SODIUM 13 UNITS/KG/HR: 10000 INJECTION, SOLUTION INTRAVENOUS at 06:02

## 2021-09-13 RX ADMIN — GABAPENTIN 300 MG: 300 CAPSULE ORAL at 21:03

## 2021-09-13 RX ADMIN — CYCLOBENZAPRINE HYDROCHLORIDE 5 MG: 5 TABLET, FILM COATED ORAL at 15:56

## 2021-09-13 RX ADMIN — OXYCODONE HYDROCHLORIDE 10 MG: 10 TABLET ORAL at 15:56

## 2021-09-13 RX ADMIN — LURASIDONE HYDROCHLORIDE 80 MG: 80 TABLET, FILM COATED ORAL at 09:00

## 2021-09-13 RX ADMIN — CHLORPROMAZINE HYDROCHLORIDE 100 MG: 25 TABLET, FILM COATED ORAL at 21:03

## 2021-09-13 RX ADMIN — ATORVASTATIN CALCIUM 40 MG: 40 TABLET, FILM COATED ORAL at 15:56

## 2021-09-13 NOTE — PROGRESS NOTES
[facility]  Progress Note - Duwaine Duverney 1985, 30 y o  female MRN: [id number]  Unit/Bed#: [facility] 836-42 Encounter: [id number]  Primary Care Provider: Meredith Early DO   Date and time admitted to hospital: 9/8/2021  6:48 AM    * Acute leg pain, left  Assessment & Plan  History of  Left leg atherosclerosis status post left SFA stent on 63/94, complicated by subsequent stent thrombosis and thrombectomy on 08/25 ,  Presenting to ED with severe left leg pain and numbness concerning for left SFA stent reocclusion most likely in the setting of  Plavix noncompliance  Pt was on PTA Xarelto and Plavix  Vascular surgery  and IR following, appreciate recommendations  Patient cleared by Cardiology for surgery  Continue heparin drip, plans for a femoral bypass scheduled on 9/14   - NPO after midnight tonight, 9/13   - Consult vascular surgery regarding when to stop her heparin drip before the proceedure  Pain management- Tylenol scheduled, Oxy 5 p r n  for moderate pain, Oxy 10 p r n  for severe pain, Dilaudid 1 mg IV q3 p r n   breakthrough pain, Gabapentin 300 QRS, Cyclobenzaprine q8 prn  Type 2 diabetes mellitus with diabetic polyneuropathy, with long-term current use of insulin [facility])  Assessment & Plan  Lab Results   Component Value Date    HGBA1C 8 5 (H) 09/08/2021       Recent Labs     09/12/21  1558 09/12/21  2058 09/13/21  0633 09/13/21  1138   POCGLU 105 118 76 83     uncontrolled as outpatient   PTA Lantus 40 units q h s  Patient received 40 units Lantus and 2 units lispro over 24 hours  Glucose levels have ranged from 118 - 76 over the past 24 hrs  Decrease Lantus to 15 units tonight, in preparation for surgery  NPO at midnight  Continue on regimen of Lantus 40 units following surgery         Peripheral artery disease ([address] )  Assessment & Plan   History of left leg atherosclerosis status post left SFA stent complicated by thrombosis, thrombectomy on 08/25, now with reocclusion of the L  SFA stent in the setting of most likely medication noncompliance   please refer to left leg pain for the plan      Bipolar disorder [facility])  Assessment & Plan  mood currently stable  PTA medications Thorazine 100 mg q h s , Latuda 80 mg, trazodone 200 mg,  Seroquel 300 mg daily   Resumed Thorazine, Latuda  continue to monitor mentation very closely    Nicotine dependence  Assessment & Plan  continue nicotine replacement   tobacco cessation counseling    COPD (chronic obstructive pulmonary disease) ([facility] [address] )  Assessment & Plan   unclear history of COPD   patient currently only on albuterol neb p r n  no other active inhalers noted   will continue to monitor    Hypertension  Assessment & Plan  -BP has been well controlled at 106/70 this morning   -Continue lisinopril 40 mg, continue prazosin qhs  Subjective:   Pt is lying in bed this morning and complains of a bad headache along with nausea  She received oxy 10 x2 and Dilaudid x1 overnight and Oxyx1 and Dilaudid x1 this morning  Per nursing, she refused tylenol for her headache  Her femoral popliteal bypass surgery is scheduled for tomorrow, 9/14  Objective:     Vitals: Blood pressure 106/70, pulse 87, temperature 98 1 °F (36 7 °C), temperature source Oral, resp  rate 18, height 5' 1" (1 549 m), weight 97 5 kg (214 lb 15 2 oz), last menstrual period 08/22/2021, SpO2 97 %, not currently breastfeeding  ,Body mass index is 40 61 kg/m²  Intake/Output Summary (Last 24 hours) at 9/13/2021 1246  Last data filed at 9/13/2021 1000  Gross per 24 hour   Intake 1444 83 ml   Output 0 ml   Net 1444 83 ml       Physical Exam:   Physical Exam  Vitals reviewed  HENT:      Head: Normocephalic  Mouth/Throat:      Mouth: Mucous membranes are moist    Eyes:      Conjunctiva/sclera: Conjunctivae normal    Cardiovascular:      Rate and Rhythm: Normal rate and regular rhythm  Heart sounds: Normal heart sounds     Pulmonary:      Effort: Pulmonary effort is normal       Breath sounds: Normal breath sounds  Musculoskeletal:      Cervical back: Normal range of motion  Skin:     General: Skin is warm  Neurological:      Mental Status: She is alert  Psychiatric:         Mood and Affect: Mood normal          Invasive Devices     Peripherally Inserted Central Catheter Line            PICC Line 97/88/71 Left Basilic 4 days                           Lab and other studies:  I have personally reviewed pertinent reports       Admission on 09/08/2021   Component Date Value    POC Glucose 09/08/2021 296*    POC Glucose 09/08/2021 173*    PTT 09/08/2021 >210*    POC Glucose 09/08/2021 162*    PTT 09/08/2021 89*    POC Glucose 09/08/2021 213*    PTT 09/09/2021 75*    WBC 09/09/2021 8 76     RBC 09/09/2021 3 63*    Hemoglobin 09/09/2021 10 4*    Hematocrit 09/09/2021 29 0*    MCV 09/09/2021 80*    MCH 09/09/2021 28 7     MCHC 09/09/2021 35 9     RDW 09/09/2021 13 5     Platelets 02/85/6696 323     MPV 09/09/2021 10 3     Sodium 09/09/2021 137     Potassium 09/09/2021 3 4*    Chloride 09/09/2021 106     CO2 09/09/2021 29     ANION GAP 09/09/2021 2*    BUN 09/09/2021 19     Creatinine 09/09/2021 0 61     Glucose 09/09/2021 126     Glucose, Fasting 09/09/2021 126*    Calcium 09/09/2021 7 9*    eGFR 09/09/2021 137     POC Glucose 09/09/2021 142*    POC Glucose 09/09/2021 206*    POC Glucose 09/09/2021 141*    POC Glucose 09/09/2021 185*    Sodium 09/10/2021 135*    Potassium 09/10/2021 3 6     Chloride 09/10/2021 106     CO2 09/10/2021 11*    ANION GAP 09/10/2021 18*    BUN 09/10/2021 28*    Creatinine 09/10/2021 0 57*    Glucose 09/10/2021 112     Calcium 09/10/2021 7 9*    eGFR 09/10/2021 140     PTT 09/10/2021 >210*    POC Glucose 09/10/2021 93     PTT 09/10/2021 88*    POC Glucose 09/10/2021 195*    POC Glucose 09/10/2021 159*    POC Glucose 09/10/2021 190*    PTT 09/11/2021 99*    WBC 09/11/2021 7 28     RBC 09/11/2021 3 71*    Hemoglobin 09/11/2021 10 5*    Hematocrit 09/11/2021 30 0*    MCV 09/11/2021 81*    MCH 09/11/2021 28 3     MCHC 09/11/2021 35 0     RDW 09/11/2021 13 2     Platelets 85/57/9038 299     MPV 09/11/2021 10 5     Sodium 09/11/2021 135*    Potassium 09/11/2021 3 6     Chloride 09/11/2021 105     CO2 09/11/2021 26     ANION GAP 09/11/2021 4     BUN 09/11/2021 22     Creatinine 09/11/2021 0 74     Glucose 09/11/2021 298*    Calcium 09/11/2021 8 2*    eGFR 09/11/2021 122     POC Glucose 09/11/2021 231*    POC Glucose 09/11/2021 203*    PTT 09/11/2021 60*    POC Glucose 09/11/2021 121     PTT 09/11/2021 70*    PTT 09/12/2021 93*    PTT 09/12/2021 86*    POC Glucose 09/12/2021 106     PTT 09/12/2021 78*    POC Glucose 09/12/2021 105     POC Glucose 09/12/2021 118     PTT 09/13/2021 76*    ABO Grouping 09/13/2021 B     Rh Factor 09/13/2021 Positive     Antibody Screen 09/13/2021 Negative     Specimen Expiration Date 09/13/2021 04958307     Unit Product Code 09/13/2021 A5080D93     Unit Number 09/13/2021 [id number]     Unit ABO 09/13/2021 B     Unit RH 09/13/2021 POS     Crossmatch 09/13/2021 Compatible     Unit Dispense Status 09/13/2021 Crossmatched     Unit Product Volume 09/13/2021 350     Unit Product Code 09/13/2021 M9532F34     Unit Number 09/13/2021 [id number]     Unit ABO 09/13/2021 B     Unit RH 09/13/2021 POS     Crossmatch 09/13/2021 Compatible     Unit Dispense Status 09/13/2021 Crossmatched     Unit Product Volume 09/13/2021 350     POC Glucose 09/13/2021 76     Unit Product Code 09/13/2021 Q5932H60     Unit Number 09/13/2021 [id number]     Unit ABO 09/13/2021 B     Unit [facility] 09/13/2021 POS     Crossmatch 09/13/2021 Compatible     Unit Dispense Status 09/13/2021 Crossmatched     Unit Product Volume 09/13/2021 350     Unit Product Code 09/13/2021 R9101Y41     Unit Number 09/13/2021 [id number]     Unit ABO 09/13/2021 B     Unit RH 09/13/2021 POS     Crossmatch 09/13/2021 Compatible     Unit Dispense Status 09/13/2021 Crossmatched     Unit Product Volume 09/13/2021 350     POC Glucose 09/13/2021 83        Recent Results (from the past 24 hour(s))   APTT    Collection Time: 09/12/21 12:56 PM   Result Value Ref Range    PTT 86 (H) 23 - 37 seconds   Fingerstick Glucose (POCT)    Collection Time: 09/12/21  3:58 PM   Result Value Ref Range    POC Glucose 105 65 - 140 mg/dl   APTT    Collection Time: 09/12/21  7:01 PM   Result Value Ref Range    PTT 78 (H) 23 - 37 seconds   Fingerstick Glucose (POCT)    Collection Time: 09/12/21  8:58 PM   Result Value Ref Range    POC Glucose 118 65 - 140 mg/dl   APTT    Collection Time: 09/13/21  6:01 AM   Result Value Ref Range    PTT 76 (H) 23 - 37 seconds   Type and screen    Collection Time: 09/13/21  6:01 AM   Result Value Ref Range    ABO Grouping B     Rh Factor Positive     Antibody Screen Negative     Specimen Expiration Date [id number]    Fingerstick Glucose (POCT)    Collection Time: 09/13/21  6:33 AM   Result Value Ref Range    POC Glucose 76 65 - 140 mg/dl   Prepare Leukoreduced RBC: 2 Units    Collection Time: 09/13/21  7:38 AM   Result Value Ref Range    Unit Product Code S4576T38     Unit Number [id number]     Unit ABO B     Unit [facility] POS     Crossmatch Compatible     Unit Dispense Status Crossmatched     Unit Product Volume 350 mL    Unit Product Code T8456C17     Unit Number [id number]     Unit ABO B     Unit [facility] POS     Crossmatch Compatible     Unit Dispense Status Crossmatched     Unit Product Volume 350 mL   Prepare Leukoreduced RBC: 2 Units    Collection Time: 09/13/21 11:05 AM   Result Value Ref Range    Unit Product Code T3036P94     Unit Number [id number]     Unit ABO B     Unit [facility] POS     Crossmatch Compatible     Unit Dispense Status Crossmatched     Unit Product Volume 350 ml    Unit Product Code W1681V51     Unit Number [id number]     Unit ABO B     Unit RH POS     Crossmatch Compatible     Unit Dispense Status Crossmatched     Unit Product Volume 350 mL   Fingerstick Glucose (POCT)    Collection Time: 09/13/21 11:38 AM   Result Value Ref Range    POC Glucose 83 65 - 140 mg/dl     Blood Culture:   Lab Results   Component Value Date    BLOODCX No Growth After 5 Days  08/22/2021    BLOODCX No Growth After 5 Days   08/22/2021   ,   Urinalysis:   Lab Results   Component Value Date    COLORU Yellow 08/24/2021    CLARITYU Clear 08/24/2021    SPECGRAV 1 017 08/24/2021    PHUR 5 5 08/24/2021    PHUR 7 5 08/07/2020    LEUKOCYTESUR Negative 08/24/2021    NITRITE Negative 08/24/2021    GLUCOSEU 500 (1/2%) (A) 08/24/2021    KETONESU Negative 08/24/2021    BILIRUBINUR Negative 08/24/2021    BLOODU Moderate (A) 08/24/2021   ,   Urine Culture: No results found for: URINECX,   Wound Culure: No results found for: WOUNDCULT      Imaging:  None       VTE Pharmacologic Prophylaxis: Heparin and Plavix  VTE Mechanical Prophylaxis: sequential compression device    Current Facility-Administered Medications   Medication Dose Route Frequency    acetaminophen (TYLENOL) tablet 975 mg  975 mg Oral Q6H [address]    albuterol inhalation solution 2 5 mg  2 5 mg Nebulization Q6H PRN    atorvastatin (LIPITOR) tablet 40 mg  40 mg Oral Daily With Dinner    [START ON 9/14/2021] chlorhexidine (PERIDEX) 0 12 % oral rinse 15 mL  15 mL Swish & Spit Once    chlorproMAZINE (THORAZINE) tablet 100 mg  100 mg Oral HS    clopidogrel (PLAVIX) tablet 75 mg  75 mg Oral Daily    cyclobenzaprine (FLEXERIL) tablet 5 mg  5 mg Oral TID    diphenhydrAMINE (BENADRYL) tablet 25 mg  25 mg Oral Q6H PRN    gabapentin (NEURONTIN) capsule 300 mg  300 mg Oral BID    heparin (porcine) 25,000 units in 0 45% NaCl 250 mL infusion (premix)  3-30 Units/kg/hr (Order-Specific) Intravenous Titrated    heparin (porcine) injection 3,800 Units  3,800 Units Intravenous Q1H PRN    heparin (porcine) injection 7,600 Units 7,600 Units Intravenous Q1H PRN    HYDROmorphone (DILAUDID) injection 1 mg  1 mg Intravenous Q3H PRN    insulin glargine (LANTUS) subcutaneous injection 40 Units 0 4 mL  40 Units Subcutaneous HS    insulin lispro (HumaLOG) 100 units/mL subcutaneous injection 2-12 Units  2-12 Units Subcutaneous TID AC    lurasidone (LATUDA) tablet 80 mg  80 mg Oral Daily With Breakfast    nicotine (NICODERM CQ) 21 mg/24 hr TD 24 hr patch 1 patch  1 patch Transdermal Daily    oxyCODONE (ROXICODONE) immediate release tablet 10 mg  10 mg Oral Q4H PRN    oxyCODONE (ROXICODONE) IR tablet 5 mg  5 mg Oral Q4H PRN    polyethylene glycol (MIRALAX) packet 17 g  17 g Oral Daily PRN    prazosin (MINIPRESS) capsule 1 mg  1 mg Oral HS    [START ON 9/14/2021] sodium chloride 0 9 % infusion  100 mL/hr Intravenous Continuous         PPX:  Heparin drip  Diet:  Diabetic diet  Code Status:  Level 1  Dispo:  Plan for OR tomorrow    Plan D/W Dr Arceo and [facility] Team      Alee Vincent MD  Family Medicine Resident PGY1

## 2021-09-13 NOTE — TELEPHONE ENCOUNTER
New Patient Encounter    New Patient Intake Form   Patient Details:  Mikel Diana  1985  [id number]    Background Information:  [address] starts by opening a telephone encounter and gathering the following information   Who is calling to schedule? If not self, relationship to patient? 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 Non-Cancer   Is this diagnosis confirmed? Yes   When was the diagnosis? 9/2021   Is there a confirmed diagnosis from a biopsy/tissue reviewed by pathology? No   Were outside slides requested? No   Is patient aware of diagnosis? Did Not Speak to Patient   Is there a personal history and what kind? Did Not Speak to Patient / Received via In Basket   Is there a family history and what kind? Did Not Speak to Patient / Received via In Basket   Reason for visit? Hospital Follow Up   Have you had any imaging or labs done? If so: when, where? yes  [facility]   Are records in [facility]? yes   Are records needed form an outside facility? Did Not Speak to Patient / Received via In Basket   If yes, name, city, state where facility is located  If patient has a prior history of cancer were old records obtained? NA   Was the patient told to bring a disk? Did Not Speak to Patient / Received via In Basket   Does the patient smoke or Vape? Did Not Speak to Patient / Received via In Basket   If yes, how many packs or cartridges per day? Scheduling Information:   Preferred [place]:  Any     Are there any dates/time the patient cannot be seen?     Miscellaneous:

## 2021-09-13 NOTE — ANESTHESIA PREPROCEDURE EVALUATION
Procedure:  BYPASS FEMORAL-POPLITEAL (Left Leg Upper)    30year old F with PMHx of insulin-dependent T2DM c/b PN, HTN, bipolar disorder on thorazine and latuda, PAD s/p left SFA stent c/b thrombosis s/p thrombectomy on 8/25, now with re-occlusion, previous substance use (cocaine), CAD c/b HFrEF (LVEF 50-50% with normal RV function, +defect on stress MPI in LAD distribution), chronic anemia, COPD not on O2 who presents for procedure above      Chemistry        Component Value Date/Time    K 3 6 09/11/2021 0638     09/11/2021 0638    CO2 26 09/11/2021 0638    BUN 22 09/11/2021 0638    CREATININE 0 74 09/11/2021 0638        Component Value Date/Time    CALCIUM 8 2 (L) 09/11/2021 0638    ALKPHOS 76 9 09/08/2021 0447    AST 11 (L) 09/08/2021 0447    ALT 16 09/08/2021 0447        Hb 10 5 (9/11)  Plts 299 (9/11)    Relevant Problems   CARDIO   (+) Hypertension   (+) Hypertensive urgency      ENDO   (+) Type 2 diabetes mellitus with diabetic polyneuropathy, with long-term current use of insulin (HCC)      MUSCULOSKELETAL   (+) Bursitis of left knee      NEURO/PSYCH   (+) Headache   (+) Paresthesia      PULMONARY   (+) COPD (chronic obstructive pulmonary disease) (HCC)        Physical Exam    Airway    Mallampati score: II  TM Distance: >3 FB  Neck ROM: full     Dental   No notable dental hx     Cardiovascular  Rhythm: regular, Rate: normal,     Pulmonary  Breath sounds clear to auscultation,     Other Findings    Intercisor Distance > 3cm          Anesthesia Plan  ASA Score- 3     Anesthesia Type- general with ASA Monitors  Additional Monitors: arterial line  Airway Plan: ETT  Comment: NPO appropriate  Discussed GA with arterial line and possible central line  Patient understands risks/benefits and wishes to proceed          Plan Factors-Exercise tolerance (METS): >4 METS  Chart reviewed  EKG reviewed  Imaging results reviewed  Existing labs reviewed               Induction- intravenous  Postoperative Plan- Plan for postoperative opioid use  Planned trial extubation    Informed Consent- Anesthetic plan and risks discussed with patient

## 2021-09-13 NOTE — ASSESSMENT & PLAN NOTE
31 y/o F with DM, COPD, BPD, PAD w/ L SFA PTA/stent 8/11/2021, subsequent stent thrombectomy 8/25/2021, who presents with acute Left leg pain in setting of recurrent  L SFA stent occlusion      LEAD 9/8:  L NIKKI 0 41/0/0    Plan:  NPO at midnight for OR tomorrow for fem-pop bypass with GSV  IVF p mn  Continue Heparin gtt, (Xarelto held)  Continue Plavix  Continue neurovascular monitoring  PRN analgesia

## 2021-09-13 NOTE — ASSESSMENT & PLAN NOTE
-BP has been well controlled at 106/70 this morning   -Continue lisinopril 40 mg, continue prazosin qhs

## 2021-09-13 NOTE — ASSESSMENT & PLAN NOTE
Lab Results   Component Value Date    HGBA1C 8 5 (H) 09/08/2021       Recent Labs     09/12/21  1558 09/12/21 2058 09/13/21  0633 09/13/21  1138   POCGLU 105 118 76 83     uncontrolled as outpatient   PTA Lantus 40 units q h s  Patient received 40 units Lantus and 2 units lispro over 24 hours  Glucose levels have ranged from 118 - 76 over the past 24 hrs  Decrease Lantus to 15 units tonight, in preparation for surgery  NPO at midnight  Continue on regimen of Lantus 40 units following surgery

## 2021-09-13 NOTE — ASSESSMENT & PLAN NOTE
History of  Left leg atherosclerosis status post left SFA stent on 03/67, complicated by subsequent stent thrombosis and thrombectomy on 08/25 ,  Presenting to ED with severe left leg pain and numbness concerning for left SFA stent reocclusion most likely in the setting of  Plavix noncompliance  Pt was on PTA Xarelto and Plavix  Vascular surgery  and IR following, appreciate recommendations  Patient cleared by Cardiology for surgery  Continue heparin drip, plans for a femoral bypass scheduled on 9/14   - NPO after midnight tonight, 9/13   - Consult vascular surgery regarding when to stop her heparin drip before the proceedure  Pain management- Tylenol scheduled, Oxy 5 p r n  for moderate pain, Oxy 10 p r n  for severe pain, Dilaudid 1 mg IV q3 p r n   breakthrough pain, Gabapentin 300 QRS, Cyclobenzaprine q8 prn

## 2021-09-13 NOTE — PROGRESS NOTES
[address] [facility]  Progress Note - Michael Nose 1985, 30 y o  female MRN: [id number]  Unit/Bed#: [facility] 263-51 Encounter: [id number]  Primary Care Provider: Rut Du,    Date and time admitted to hospital: 9/8/2021  6:48 AM    Peripheral artery disease [facility])  Assessment & Plan  29 y/o F with DM, COPD, BPD, PAD w/ L SFA PTA/stent 8/11/2021, subsequent stent thrombectomy 8/25/2021, who presents with acute Left leg pain in setting of recurrent  L SFA stent occlusion  LEAD 9/8:  L NIKKI 0 41/0/0    Plan:  NPO at midnight for OR tomorrow for fem-pop bypass with GSV  IVF p mn  Continue Heparin gtt, (Xarelto held)  Continue Plavix  Continue neurovascular monitoring  PRN analgesia            Vitals:  /70 (BP Location: Right arm)   Pulse 87   Temp 98 1 °F (36 7 °C) (Oral)   Resp 18   Ht 5' 1" (1 549 m)   Wt 97 5 kg (214 lb 15 2 oz)   LMP 08/22/2021   SpO2 97%   BMI 40 61 kg/m²     I/Os:  I/O last 3 completed shifts: In: 3879 7 [P O :2922; I V :957 7]  Out: 0   No intake/output data recorded      Subjective:  Patient states her left foot actually feels a little better today, only pain in the toes now, the rest of her foot feels warmer        Lab Results and Cultures:   CBC with diff: Lab Results   Component Value Date    WBC 7 28 09/11/2021    HGB 10 5 (L) 09/11/2021    HCT 30 0 (L) 09/11/2021    MCV 81 (L) 09/11/2021     09/11/2021    MCH 28 3 09/11/2021    MCHC 35 0 09/11/2021    RDW 13 2 09/11/2021    MPV 10 5 09/11/2021    NRBC 0 08/27/2021   ,   BMP/CMP:  Lab Results   Component Value Date    SODIUM 135 (L) 09/11/2021    K 3 6 09/11/2021     09/11/2021    CO2 26 09/11/2021    BUN 22 09/11/2021    CREATININE 0 74 09/11/2021    CALCIUM 8 2 (L) 09/11/2021    AST 11 (L) 09/08/2021    ALT 16 09/08/2021    ALKPHOS 76 9 09/08/2021    EGFR 122 09/11/2021   ,   Lipid Panel: No results found for: CHOL,   Coags:   Lab Results   Component Value Date    PTT 78 (H) 09/12/2021    INR 1 21 (H) 09/08/2021   ,     Blood Culture:   Lab Results   Component Value Date    BLOODCX No Growth After 5 Days  08/22/2021    BLOODCX No Growth After 5 Days   08/22/2021   ,   Urinalysis: Lab Results   Component Value Date    COLORU Yellow 08/24/2021    CLARITYU Clear 08/24/2021    SPECGRAV 1 017 08/24/2021    PHUR 5 5 08/24/2021    PHUR 7 5 08/07/2020    LEUKOCYTESUR Negative 08/24/2021    NITRITE Negative 08/24/2021    GLUCOSEU 500 (1/2%) (A) 08/24/2021    KETONESU Negative 08/24/2021    BILIRUBINUR Negative 08/24/2021    BLOODU Moderate (A) 08/24/2021   ,   Urine Culture: No results found for: URINECX,   Wound Culure: No results found for: WOUNDCULT    Medications:  Current Facility-Administered Medications   Medication Dose Route Frequency    acetaminophen (TYLENOL) tablet 975 mg  975 mg Oral Q6H [facility]    albuterol inhalation solution 2 5 mg  2 5 mg Nebulization Q6H PRN    atorvastatin (LIPITOR) tablet 40 mg  40 mg Oral Daily With Dinner    chlorproMAZINE (THORAZINE) tablet 100 mg  100 mg Oral HS    cyclobenzaprine (FLEXERIL) tablet 5 mg  5 mg Oral TID    diphenhydrAMINE (BENADRYL) tablet 25 mg  25 mg Oral Q6H PRN    gabapentin (NEURONTIN) capsule 300 mg  300 mg Oral BID    heparin (porcine) 25,000 units in 0 45% NaCl 250 mL infusion (premix)  3-30 Units/kg/hr (Order-Specific) Intravenous Titrated    heparin (porcine) injection 3,800 Units  3,800 Units Intravenous Q1H PRN    heparin (porcine) injection 7,600 Units  7,600 Units Intravenous Q1H PRN    HYDROmorphone (DILAUDID) injection 1 mg  1 mg Intravenous Q3H PRN    insulin glargine (LANTUS) subcutaneous injection 40 Units 0 4 mL  40 Units Subcutaneous HS    insulin lispro (HumaLOG) 100 units/mL subcutaneous injection 2-12 Units  2-12 Units Subcutaneous TID AC    lisinopril (ZESTRIL) tablet 40 mg  40 mg Oral Daily    lurasidone (LATUDA) tablet 80 mg  80 mg Oral Daily With Breakfast    nicotine (NICODERM CQ) 21 mg/24 hr TD 24 hr patch 1 patch  1 patch Transdermal Daily    oxyCODONE (ROXICODONE) immediate release tablet 10 mg  10 mg Oral Q4H PRN    oxyCODONE (ROXICODONE) IR tablet 5 mg  5 mg Oral Q4H PRN    polyethylene glycol (MIRALAX) packet 17 g  17 g Oral Daily PRN    prazosin (MINIPRESS) capsule 1 mg  1 mg Oral HS    [START ON 9/14/2021] sodium chloride 0 9 % infusion  100 mL/hr Intravenous Continuous         Physical Exam:  NAD, alert and oriented x3  Normocephalic, atraumatic  MMM, EOMI, PERRLA  Norm resp effort on RA  RRR  Abd soft, NT/ND  L foot warm, m/s intact, dopp PT  No calf tenderness or peripheral edema  Motor/sensation intact in distal extremities  CN grossly intact  -rash/lesions      Sanya Pete MD  9/13/2021

## 2021-09-13 NOTE — QUICK NOTE
[address] [facility], [facility] 1985, 30 y o  female  MRN: [id number]    Unit/Bed#: [facility] 934-67 Encounter: [id number]  Primary Care Provider: Shaina Abrams DO        Assessments & Plans:     30 y o  female admitted on 9/8/2021, now HD# 3, with:    Principal Problem:    Acute leg pain, left  Active Problems:    Hypertension    Type 2 diabetes mellitus with diabetic polyneuropathy, with long-term current use of insulin ([facility])    COPD (chronic obstructive pulmonary disease) ([facility])    Nicotine dependence    Bipolar disorder ([facility] )    Peripheral artery disease ([facility] )  Resolved Problems:    * No resolved hospital problems  *      - Vitals stable  - Pain level tolerable with current regimen  - Continue current management plan      Subjective:     Patient is seen and evaluated at bedside during evening round  Patient is lying in bed, appears comfortable with normal respiratory effort and without apparent distress  she is awake, alert, responds appropriately to questions  Pt states her LLE still feel numb, cold, and painful with cramping sensation  Pt states her pain is tolerable with current pain regimen  Otherwise pt denies any symptoms including headache, dizziness, cough, SOB, CP, abdo pain, N/V/D/C, fever, chills, or excessive fatigue  Nursing staff was communicated with and do not have any concerns  Objective & Vitals:     Last vitals:  Blood Pressure: 136/87 (09/12/21 1547)  Temperature: 98 °F (36 7 °C) (09/12/21 1547)  Temp Source: Oral (09/12/21 1111)  Pulse: 77 (09/12/21 1547)  Respirations: 17 (09/12/21 1547)  SpO2: 94 % (09/12/21 1547)      Intake/Output Summary (Last 24 hours) at 9/12/2021 2140  Last data filed at 9/12/2021 1904  Gross per 24 hour   Intake 3379 74 ml   Output --   Net 3379 74 ml       Physical Exam:     Physical Exam:  General: Pt observed lying comfortably in bed, NAD  Not toxic/ill-appearing  No cachectic or diaphoresis   No obvious sign of trauma or bleeding  Psych: Awake, alert, able to converse appropriately  Head: atraumatic, normocephalic  Eyes: open spontaneously, normal EOM, conjunctiva non-injected, no scleral icterus, no discharge  Ear: normal external ear, no visible drainage at external auditory orifice  Nose: clear, no epistaxis, no rhinorrhea  Throat: clear, no hoarse voice, no cough  Neck: supple, normal ROM  Heart: RRR, no murmur/distant heart sound appreciated  Lungs: LCTABL, nml respiratory effort, no agonal/labored breathing, no accessory muscle use  Abdomen: soft, nontender, nondistended, normal bowel sound  Extremities:  + LLE coldness, tender, and pulselessness   + LLE numbness up to left knee  + absent left pedal pulses  +4 strengths b/l  No weakness/edema  Golden Smith MD  PGY-2, Family Medicine  09/12/21  9:40 PM    Dear reader, please be aware that portions of my note contain dictated text  I have done my best to proof-read this note prior to signing  However, there may be occasional unnoticed errors pertaining to "sound-alike" words and/or grammar during my dictation process  If there is any words or information that is unclear or appears erroneous, please kindly let me know and I will clarify and/or addend my notes accordingly  Thank you for your understanding

## 2021-09-14 ENCOUNTER — ANESTHESIA (INPATIENT)
Dept: PERIOP | Facility: HOSPITAL | Age: 36
DRG: 253 | End: 2021-09-14
Payer: MEDICARE

## 2021-09-14 ENCOUNTER — APPOINTMENT (INPATIENT)
Dept: RADIOLOGY | Facility: HOSPITAL | Age: 36
DRG: 253 | End: 2021-09-14
Payer: MEDICARE

## 2021-09-14 LAB
ANION GAP SERPL CALCULATED.3IONS-SCNC: 1 MMOL/L (ref 4–13)
APTT PPP: 76 SECONDS (ref 23–37)
B-HCG SERPL-ACNC: <2 MIU/ML
BASE EXCESS BLDA CALC-SCNC: -1 MMOL/L (ref -2–3)
BASE EXCESS BLDA CALC-SCNC: 0 MMOL/L (ref -2–3)
BASE EXCESS BLDA CALC-SCNC: 1 MMOL/L (ref -2–3)
BASOPHILS # BLD AUTO: 0.03 THOUSANDS/ΜL (ref 0–0.1)
BASOPHILS NFR BLD AUTO: 0 % (ref 0–1)
BUN SERPL-MCNC: 27 MG/DL (ref 5–25)
CA-I BLD-SCNC: 1.04 MMOL/L (ref 1.12–1.32)
CA-I BLD-SCNC: 1.05 MMOL/L (ref 1.12–1.32)
CA-I BLD-SCNC: 1.06 MMOL/L (ref 1.12–1.32)
CA-I BLD-SCNC: 1.13 MMOL/L (ref 1.12–1.32)
CA-I BLD-SCNC: 1.14 MMOL/L (ref 1.12–1.32)
CALCIUM SERPL-MCNC: 8.5 MG/DL (ref 8.3–10.1)
CHEST PAIN STATEMENT: NORMAL
CHLORIDE SERPL-SCNC: 105 MMOL/L (ref 100–108)
CO2 SERPL-SCNC: 28 MMOL/L (ref 21–32)
CREAT SERPL-MCNC: 0.76 MG/DL (ref 0.6–1.3)
EOSINOPHIL # BLD AUTO: 0.33 THOUSAND/ΜL (ref 0–0.61)
EOSINOPHIL NFR BLD AUTO: 5 % (ref 0–6)
ERYTHROCYTE [DISTWIDTH] IN BLOOD BY AUTOMATED COUNT: 13.7 % (ref 11.6–15.1)
GFR SERPL CREATININE-BSD FRML MDRD: 118 ML/MIN/1.73SQ M
GLUCOSE SERPL-MCNC: 103 MG/DL (ref 65–140)
GLUCOSE SERPL-MCNC: 115 MG/DL (ref 65–140)
GLUCOSE SERPL-MCNC: 156 MG/DL (ref 65–140)
GLUCOSE SERPL-MCNC: 162 MG/DL (ref 65–140)
GLUCOSE SERPL-MCNC: 163 MG/DL (ref 65–140)
GLUCOSE SERPL-MCNC: 173 MG/DL (ref 65–140)
GLUCOSE SERPL-MCNC: 177 MG/DL (ref 65–140)
GLUCOSE SERPL-MCNC: 207 MG/DL (ref 65–140)
GLUCOSE SERPL-MCNC: 212 MG/DL (ref 65–140)
GLUCOSE SERPL-MCNC: 223 MG/DL (ref 65–140)
HCO3 BLDA-SCNC: 23.9 MMOL/L (ref 22–28)
HCO3 BLDA-SCNC: 25 MMOL/L (ref 22–28)
HCO3 BLDA-SCNC: 25.2 MMOL/L (ref 22–28)
HCO3 BLDA-SCNC: 25.4 MMOL/L (ref 22–28)
HCO3 BLDA-SCNC: 25.9 MMOL/L (ref 22–28)
HCT VFR BLD AUTO: 32.8 % (ref 34.8–46.1)
HCT VFR BLD CALC: 26 % (ref 34.8–46.1)
HCT VFR BLD CALC: 27 % (ref 34.8–46.1)
HCT VFR BLD CALC: 27 % (ref 34.8–46.1)
HCT VFR BLD CALC: 28 % (ref 34.8–46.1)
HCT VFR BLD CALC: 29 % (ref 34.8–46.1)
HGB BLD-MCNC: 11.4 G/DL (ref 11.5–15.4)
HGB BLDA-MCNC: 8.8 G/DL (ref 11.5–15.4)
HGB BLDA-MCNC: 9.2 G/DL (ref 11.5–15.4)
HGB BLDA-MCNC: 9.2 G/DL (ref 11.5–15.4)
HGB BLDA-MCNC: 9.5 G/DL (ref 11.5–15.4)
HGB BLDA-MCNC: 9.9 G/DL (ref 11.5–15.4)
IMM GRANULOCYTES # BLD AUTO: 0.11 THOUSAND/UL (ref 0–0.2)
IMM GRANULOCYTES NFR BLD AUTO: 2 % (ref 0–2)
INR PPP: 0.98 (ref 0.84–1.19)
KCT BLD-ACNC: 225 SEC (ref 89–137)
KCT BLD-ACNC: 237 SEC (ref 89–137)
KCT BLD-ACNC: 243 SEC (ref 89–137)
KCT BLD-ACNC: 249 SEC (ref 89–137)
KCT BLD-ACNC: 286 SEC (ref 89–137)
LYMPHOCYTES # BLD AUTO: 2.42 THOUSANDS/ΜL (ref 0.6–4.47)
LYMPHOCYTES NFR BLD AUTO: 33 % (ref 14–44)
MAX DIASTOLIC BP: 88 MMHG
MAX HEART RATE: 123 BPM
MAX PREDICTED HEART RATE: 186 BPM
MAX. SYSTOLIC BP: 152 MMHG
MCH RBC QN AUTO: 27.7 PG (ref 26.8–34.3)
MCHC RBC AUTO-ENTMCNC: 34.8 G/DL (ref 31.4–37.4)
MCV RBC AUTO: 80 FL (ref 82–98)
MONOCYTES # BLD AUTO: 0.58 THOUSAND/ΜL (ref 0.17–1.22)
MONOCYTES NFR BLD AUTO: 8 % (ref 4–12)
NEUTROPHILS # BLD AUTO: 3.87 THOUSANDS/ΜL (ref 1.85–7.62)
NEUTS SEG NFR BLD AUTO: 52 % (ref 43–75)
NRBC BLD AUTO-RTO: 0 /100 WBCS
PCO2 BLD: 25 MMOL/L (ref 21–32)
PCO2 BLD: 26 MMOL/L (ref 21–32)
PCO2 BLD: 26 MMOL/L (ref 21–32)
PCO2 BLD: 27 MMOL/L (ref 21–32)
PCO2 BLD: 27 MMOL/L (ref 21–32)
PCO2 BLD: 39.1 MM HG (ref 36–44)
PCO2 BLD: 39.3 MM HG (ref 36–44)
PCO2 BLD: 41.2 MM HG (ref 36–44)
PCO2 BLD: 41.4 MM HG (ref 36–44)
PCO2 BLD: 41.6 MM HG (ref 36–44)
PH BLD: 7.39 [PH] (ref 7.35–7.45)
PH BLD: 7.4 [PH] (ref 7.35–7.45)
PH BLD: 7.41 [PH] (ref 7.35–7.45)
PLATELET # BLD AUTO: 281 THOUSANDS/UL (ref 149–390)
PMV BLD AUTO: 10.5 FL (ref 8.9–12.7)
PO2 BLD: 136 MM HG (ref 75–129)
PO2 BLD: 167 MM HG (ref 75–129)
PO2 BLD: 167 MM HG (ref 75–129)
PO2 BLD: 174 MM HG (ref 75–129)
PO2 BLD: 181 MM HG (ref 75–129)
POTASSIUM BLD-SCNC: 4.3 MMOL/L (ref 3.5–5.3)
POTASSIUM BLD-SCNC: 4.4 MMOL/L (ref 3.5–5.3)
POTASSIUM BLD-SCNC: 4.6 MMOL/L (ref 3.5–5.3)
POTASSIUM BLD-SCNC: 4.7 MMOL/L (ref 3.5–5.3)
POTASSIUM BLD-SCNC: 5 MMOL/L (ref 3.5–5.3)
POTASSIUM SERPL-SCNC: 4.3 MMOL/L (ref 3.5–5.3)
PROTHROMBIN TIME: 12.6 SECONDS (ref 11.6–14.5)
PROTOCOL NAME: NORMAL
RBC # BLD AUTO: 4.11 MILLION/UL (ref 3.81–5.12)
REASON FOR TERMINATION: NORMAL
SAO2 % BLD FROM PO2: 100 % (ref 60–85)
SAO2 % BLD FROM PO2: 99 % (ref 60–85)
SAO2 % BLD FROM PO2: 99 % (ref 60–85)
SODIUM BLD-SCNC: 134 MMOL/L (ref 136–145)
SODIUM BLD-SCNC: 135 MMOL/L (ref 136–145)
SODIUM BLD-SCNC: 135 MMOL/L (ref 136–145)
SODIUM BLD-SCNC: 136 MMOL/L (ref 136–145)
SODIUM BLD-SCNC: 136 MMOL/L (ref 136–145)
SODIUM SERPL-SCNC: 134 MMOL/L (ref 136–145)
SPECIMEN SOURCE: ABNORMAL
TARGET HR FORMULA: NORMAL
TEST INDICATION: NORMAL
TIME IN EXERCISE PHASE: NORMAL
WBC # BLD AUTO: 7.34 THOUSAND/UL (ref 4.31–10.16)

## 2021-09-14 PROCEDURE — 94760 N-INVAS EAR/PLS OXIMETRY 1: CPT

## 2021-09-14 PROCEDURE — 85730 THROMBOPLASTIN TIME PARTIAL: CPT | Performed by: FAMILY MEDICINE

## 2021-09-14 PROCEDURE — 82948 REAGENT STRIP/BLOOD GLUCOSE: CPT

## 2021-09-14 PROCEDURE — 82947 ASSAY GLUCOSE BLOOD QUANT: CPT

## 2021-09-14 PROCEDURE — 041L09L BYPASS LEFT FEMORAL ARTERY TO POPLITEAL ARTERY WITH AUTOLOGOUS VENOUS TISSUE, OPEN APPROACH: ICD-10-PCS | Performed by: SURGERY

## 2021-09-14 PROCEDURE — 80048 BASIC METABOLIC PNL TOTAL CA: CPT | Performed by: STUDENT IN AN ORGANIZED HEALTH CARE EDUCATION/TRAINING PROGRAM

## 2021-09-14 PROCEDURE — C1894 INTRO/SHEATH, NON-LASER: HCPCS | Performed by: SURGERY

## 2021-09-14 PROCEDURE — NC001 PR NO CHARGE: Performed by: SURGERY

## 2021-09-14 PROCEDURE — 35583 VEIN BYP GRFT FEM-POPLITEAL: CPT | Performed by: SURGERY

## 2021-09-14 PROCEDURE — 85014 HEMATOCRIT: CPT

## 2021-09-14 PROCEDURE — 82330 ASSAY OF CALCIUM: CPT

## 2021-09-14 PROCEDURE — 99232 SBSQ HOSP IP/OBS MODERATE 35: CPT | Performed by: FAMILY MEDICINE

## 2021-09-14 PROCEDURE — B41G1ZZ FLUOROSCOPY OF LEFT LOWER EXTREMITY ARTERIES USING LOW OSMOLAR CONTRAST: ICD-10-PCS | Performed by: SURGERY

## 2021-09-14 PROCEDURE — 84132 ASSAY OF SERUM POTASSIUM: CPT

## 2021-09-14 PROCEDURE — 75710 ARTERY X-RAYS ARM/LEG: CPT

## 2021-09-14 PROCEDURE — 85347 COAGULATION TIME ACTIVATED: CPT

## 2021-09-14 PROCEDURE — 99024 POSTOP FOLLOW-UP VISIT: CPT | Performed by: SURGERY

## 2021-09-14 PROCEDURE — 84295 ASSAY OF SERUM SODIUM: CPT

## 2021-09-14 PROCEDURE — 82803 BLOOD GASES ANY COMBINATION: CPT

## 2021-09-14 PROCEDURE — 84702 CHORIONIC GONADOTROPIN TEST: CPT | Performed by: SURGERY

## 2021-09-14 PROCEDURE — 85610 PROTHROMBIN TIME: CPT | Performed by: STUDENT IN AN ORGANIZED HEALTH CARE EDUCATION/TRAINING PROGRAM

## 2021-09-14 PROCEDURE — 85025 COMPLETE CBC W/AUTO DIFF WBC: CPT | Performed by: STUDENT IN AN ORGANIZED HEALTH CARE EDUCATION/TRAINING PROGRAM

## 2021-09-14 RX ORDER — ROCURONIUM BROMIDE 10 MG/ML
INJECTION, SOLUTION INTRAVENOUS AS NEEDED
Status: DISCONTINUED | OUTPATIENT
Start: 2021-09-14 | End: 2021-09-14

## 2021-09-14 RX ORDER — ESMOLOL HYDROCHLORIDE 10 MG/ML
INJECTION INTRAVENOUS AS NEEDED
Status: DISCONTINUED | OUTPATIENT
Start: 2021-09-14 | End: 2021-09-14

## 2021-09-14 RX ORDER — DEXAMETHASONE SODIUM PHOSPHATE 10 MG/ML
INJECTION, SOLUTION INTRAMUSCULAR; INTRAVENOUS AS NEEDED
Status: DISCONTINUED | OUTPATIENT
Start: 2021-09-14 | End: 2021-09-14

## 2021-09-14 RX ORDER — EPHEDRINE SULFATE 50 MG/ML
INJECTION INTRAVENOUS AS NEEDED
Status: DISCONTINUED | OUTPATIENT
Start: 2021-09-14 | End: 2021-09-14

## 2021-09-14 RX ORDER — ONDANSETRON 2 MG/ML
4 INJECTION INTRAMUSCULAR; INTRAVENOUS ONCE AS NEEDED
Status: DISCONTINUED | OUTPATIENT
Start: 2021-09-14 | End: 2021-09-14 | Stop reason: HOSPADM

## 2021-09-14 RX ORDER — CALCIUM CHLORIDE 100 MG/ML
INJECTION INTRAVENOUS; INTRAVENTRICULAR AS NEEDED
Status: DISCONTINUED | OUTPATIENT
Start: 2021-09-14 | End: 2021-09-14

## 2021-09-14 RX ORDER — CEFAZOLIN SODIUM 2 G/50ML
2000 SOLUTION INTRAVENOUS ONCE
Status: COMPLETED | OUTPATIENT
Start: 2021-09-14 | End: 2021-09-14

## 2021-09-14 RX ORDER — LABETALOL 20 MG/4 ML (5 MG/ML) INTRAVENOUS SYRINGE
AS NEEDED
Status: DISCONTINUED | OUTPATIENT
Start: 2021-09-14 | End: 2021-09-14

## 2021-09-14 RX ORDER — HEPARIN SODIUM 1000 [USP'U]/ML
7600 INJECTION, SOLUTION INTRAVENOUS; SUBCUTANEOUS
Status: DISCONTINUED | OUTPATIENT
Start: 2021-09-14 | End: 2021-09-15

## 2021-09-14 RX ORDER — HEPARIN SODIUM 1000 [USP'U]/ML
3800 INJECTION, SOLUTION INTRAVENOUS; SUBCUTANEOUS
Status: DISCONTINUED | OUTPATIENT
Start: 2021-09-14 | End: 2021-09-15

## 2021-09-14 RX ORDER — KETOROLAC TROMETHAMINE 30 MG/ML
INJECTION, SOLUTION INTRAMUSCULAR; INTRAVENOUS AS NEEDED
Status: DISCONTINUED | OUTPATIENT
Start: 2021-09-14 | End: 2021-09-14

## 2021-09-14 RX ORDER — SODIUM CHLORIDE, SODIUM GLUCONATE, SODIUM ACETATE, POTASSIUM CHLORIDE, MAGNESIUM CHLORIDE, SODIUM PHOSPHATE, DIBASIC, AND POTASSIUM PHOSPHATE .53; .5; .37; .037; .03; .012; .00082 G/100ML; G/100ML; G/100ML; G/100ML; G/100ML; G/100ML; G/100ML
INJECTION, SOLUTION INTRAVENOUS AS NEEDED
Status: DISCONTINUED | OUTPATIENT
Start: 2021-09-14 | End: 2021-09-14

## 2021-09-14 RX ORDER — PROPOFOL 10 MG/ML
INJECTION, EMULSION INTRAVENOUS AS NEEDED
Status: DISCONTINUED | OUTPATIENT
Start: 2021-09-14 | End: 2021-09-14

## 2021-09-14 RX ORDER — ONDANSETRON 2 MG/ML
INJECTION INTRAMUSCULAR; INTRAVENOUS AS NEEDED
Status: DISCONTINUED | OUTPATIENT
Start: 2021-09-14 | End: 2021-09-14

## 2021-09-14 RX ORDER — HEPARIN SODIUM 10000 [USP'U]/100ML
3-30 INJECTION, SOLUTION INTRAVENOUS
Status: DISCONTINUED | OUTPATIENT
Start: 2021-09-15 | End: 2021-09-15

## 2021-09-14 RX ORDER — MIDAZOLAM HYDROCHLORIDE 2 MG/2ML
INJECTION, SOLUTION INTRAMUSCULAR; INTRAVENOUS AS NEEDED
Status: DISCONTINUED | OUTPATIENT
Start: 2021-09-14 | End: 2021-09-14

## 2021-09-14 RX ORDER — SODIUM CHLORIDE, SODIUM GLUCONATE, SODIUM ACETATE, POTASSIUM CHLORIDE, MAGNESIUM CHLORIDE, SODIUM PHOSPHATE, DIBASIC, AND POTASSIUM PHOSPHATE .53; .5; .37; .037; .03; .012; .00082 G/100ML; G/100ML; G/100ML; G/100ML; G/100ML; G/100ML; G/100ML
INJECTION, SOLUTION INTRAVENOUS CONTINUOUS PRN
Status: DISCONTINUED | OUTPATIENT
Start: 2021-09-14 | End: 2021-09-14

## 2021-09-14 RX ORDER — SODIUM CHLORIDE, SODIUM LACTATE, POTASSIUM CHLORIDE, CALCIUM CHLORIDE 600; 310; 30; 20 MG/100ML; MG/100ML; MG/100ML; MG/100ML
75 INJECTION, SOLUTION INTRAVENOUS CONTINUOUS
Status: DISCONTINUED | OUTPATIENT
Start: 2021-09-14 | End: 2021-09-15

## 2021-09-14 RX ORDER — FENTANYL CITRATE 50 UG/ML
INJECTION, SOLUTION INTRAMUSCULAR; INTRAVENOUS AS NEEDED
Status: DISCONTINUED | OUTPATIENT
Start: 2021-09-14 | End: 2021-09-14

## 2021-09-14 RX ORDER — LABETALOL 20 MG/4 ML (5 MG/ML) INTRAVENOUS SYRINGE
5
Status: DISCONTINUED | OUTPATIENT
Start: 2021-09-14 | End: 2021-09-14 | Stop reason: HOSPADM

## 2021-09-14 RX ORDER — HYDROMORPHONE HCL/PF 1 MG/ML
0.5 SYRINGE (ML) INJECTION
Status: DISCONTINUED | OUTPATIENT
Start: 2021-09-14 | End: 2021-09-14 | Stop reason: HOSPADM

## 2021-09-14 RX ORDER — ACETAMINOPHEN 325 MG/1
975 TABLET ORAL ONCE
Status: COMPLETED | OUTPATIENT
Start: 2021-09-14 | End: 2021-09-14

## 2021-09-14 RX ORDER — ETOMIDATE 2 MG/ML
INJECTION INTRAVENOUS AS NEEDED
Status: DISCONTINUED | OUTPATIENT
Start: 2021-09-14 | End: 2021-09-14

## 2021-09-14 RX ORDER — SODIUM CHLORIDE, SODIUM LACTATE, POTASSIUM CHLORIDE, CALCIUM CHLORIDE 600; 310; 30; 20 MG/100ML; MG/100ML; MG/100ML; MG/100ML
INJECTION, SOLUTION INTRAVENOUS CONTINUOUS PRN
Status: DISCONTINUED | OUTPATIENT
Start: 2021-09-14 | End: 2021-09-14

## 2021-09-14 RX ORDER — HEPARIN SODIUM 1000 [USP'U]/ML
INJECTION, SOLUTION INTRAVENOUS; SUBCUTANEOUS AS NEEDED
Status: DISCONTINUED | OUTPATIENT
Start: 2021-09-14 | End: 2021-09-14

## 2021-09-14 RX ORDER — KETAMINE HYDROCHLORIDE 50 MG/ML
INJECTION, SOLUTION, CONCENTRATE INTRAMUSCULAR; INTRAVENOUS AS NEEDED
Status: DISCONTINUED | OUTPATIENT
Start: 2021-09-14 | End: 2021-09-14

## 2021-09-14 RX ORDER — PROTAMINE SULFATE 10 MG/ML
INJECTION, SOLUTION INTRAVENOUS AS NEEDED
Status: DISCONTINUED | OUTPATIENT
Start: 2021-09-14 | End: 2021-09-14

## 2021-09-14 RX ADMIN — SODIUM CHLORIDE, SODIUM GLUCONATE, SODIUM ACETATE, POTASSIUM CHLORIDE, MAGNESIUM CHLORIDE, SODIUM PHOSPHATE, DIBASIC, AND POTASSIUM PHOSPHATE 1000 ML: .53; .5; .37; .037; .03; .012; .00082 INJECTION, SOLUTION INTRAVENOUS at 13:17

## 2021-09-14 RX ADMIN — HYDROMORPHONE HYDROCHLORIDE 0.5 MG: 1 INJECTION, SOLUTION INTRAMUSCULAR; INTRAVENOUS; SUBCUTANEOUS at 18:50

## 2021-09-14 RX ADMIN — CEFAZOLIN SODIUM 2000 MG: 2 SOLUTION INTRAVENOUS at 10:57

## 2021-09-14 RX ADMIN — LURASIDONE HYDROCHLORIDE 80 MG: 80 TABLET, FILM COATED ORAL at 08:01

## 2021-09-14 RX ADMIN — KETAMINE HYDROCHLORIDE 0.1 MG/KG/HR: 50 INJECTION, SOLUTION INTRAMUSCULAR; INTRAVENOUS at 19:31

## 2021-09-14 RX ADMIN — MIDAZOLAM 0.5 MG: 1 INJECTION INTRAMUSCULAR; INTRAVENOUS at 10:21

## 2021-09-14 RX ADMIN — DEXAMETHASONE SODIUM PHOSPHATE 4 MG: 10 INJECTION, SOLUTION INTRAMUSCULAR; INTRAVENOUS at 11:15

## 2021-09-14 RX ADMIN — ROCURONIUM BROMIDE 50 MG: 50 INJECTION, SOLUTION INTRAVENOUS at 10:27

## 2021-09-14 RX ADMIN — LABETALOL 20 MG/4 ML (5 MG/ML) INTRAVENOUS SYRINGE 10 MG: at 17:59

## 2021-09-14 RX ADMIN — HEPARIN SODIUM 2000 UNITS: 1000 INJECTION INTRAVENOUS; SUBCUTANEOUS at 15:20

## 2021-09-14 RX ADMIN — KETOROLAC TROMETHAMINE 15 MG: 30 INJECTION, SOLUTION INTRAMUSCULAR at 17:45

## 2021-09-14 RX ADMIN — PROPOFOL 30 MG: 10 INJECTION, EMULSION INTRAVENOUS at 10:27

## 2021-09-14 RX ADMIN — ROCURONIUM BROMIDE 20 MG: 50 INJECTION, SOLUTION INTRAVENOUS at 16:49

## 2021-09-14 RX ADMIN — FENTANYL CITRATE 50 MCG: 50 INJECTION INTRAMUSCULAR; INTRAVENOUS at 15:07

## 2021-09-14 RX ADMIN — FENTANYL CITRATE 50 MCG: 50 INJECTION INTRAMUSCULAR; INTRAVENOUS at 16:39

## 2021-09-14 RX ADMIN — NICOTINE 1 PATCH: 21 PATCH, EXTENDED RELEASE TRANSDERMAL at 08:16

## 2021-09-14 RX ADMIN — ACETAMINOPHEN 975 MG: 325 TABLET, FILM COATED ORAL at 05:39

## 2021-09-14 RX ADMIN — MIDAZOLAM 0.5 MG: 1 INJECTION INTRAMUSCULAR; INTRAVENOUS at 10:23

## 2021-09-14 RX ADMIN — CEFAZOLIN SODIUM 2000 MG: 2 SOLUTION INTRAVENOUS at 14:54

## 2021-09-14 RX ADMIN — HEPARIN SODIUM 9000 UNITS: 1000 INJECTION INTRAVENOUS; SUBCUTANEOUS at 14:00

## 2021-09-14 RX ADMIN — OXYCODONE HYDROCHLORIDE 10 MG: 10 TABLET ORAL at 08:02

## 2021-09-14 RX ADMIN — FENTANYL CITRATE 150 MCG: 50 INJECTION INTRAMUSCULAR; INTRAVENOUS at 10:27

## 2021-09-14 RX ADMIN — FENTANYL CITRATE 50 MCG: 50 INJECTION INTRAMUSCULAR; INTRAVENOUS at 17:34

## 2021-09-14 RX ADMIN — ESMOLOL HYDROCHLORIDE 30 MG: 100 INJECTION, SOLUTION INTRAVENOUS at 10:27

## 2021-09-14 RX ADMIN — FENTANYL CITRATE 50 MCG: 50 INJECTION INTRAMUSCULAR; INTRAVENOUS at 14:06

## 2021-09-14 RX ADMIN — INSULIN HUMAN 5 UNITS: 100 INJECTION, SOLUTION PARENTERAL at 18:51

## 2021-09-14 RX ADMIN — PHENYLEPHRINE HYDROCHLORIDE 30 MCG/MIN: 10 INJECTION INTRAVENOUS at 11:01

## 2021-09-14 RX ADMIN — CALCIUM CHLORIDE 0.2 G: 100 INJECTION, SOLUTION INTRAVENOUS; INTRAVENTRICULAR at 13:24

## 2021-09-14 RX ADMIN — HYDROMORPHONE HYDROCHLORIDE 1 MG: 1 INJECTION, SOLUTION INTRAMUSCULAR; INTRAVENOUS; SUBCUTANEOUS at 05:39

## 2021-09-14 RX ADMIN — LABETALOL 20 MG/4 ML (5 MG/ML) INTRAVENOUS SYRINGE 5 MG: at 17:55

## 2021-09-14 RX ADMIN — ROCURONIUM BROMIDE 30 MG: 50 INJECTION, SOLUTION INTRAVENOUS at 11:52

## 2021-09-14 RX ADMIN — ONDANSETRON 4 MG: 2 INJECTION INTRAMUSCULAR; INTRAVENOUS at 18:02

## 2021-09-14 RX ADMIN — INSULIN HUMAN 10 UNITS: 100 INJECTION, SOLUTION PARENTERAL at 19:58

## 2021-09-14 RX ADMIN — FENTANYL CITRATE 50 MCG: 50 INJECTION INTRAMUSCULAR; INTRAVENOUS at 12:47

## 2021-09-14 RX ADMIN — HYDROMORPHONE HYDROCHLORIDE 1 MG: 1 INJECTION, SOLUTION INTRAMUSCULAR; INTRAVENOUS; SUBCUTANEOUS at 09:31

## 2021-09-14 RX ADMIN — PROTAMINE SULFATE 5 MG: 10 INJECTION, SOLUTION INTRAVENOUS at 17:08

## 2021-09-14 RX ADMIN — CYCLOBENZAPRINE HYDROCHLORIDE 5 MG: 5 TABLET, FILM COATED ORAL at 21:30

## 2021-09-14 RX ADMIN — FENTANYL CITRATE 50 MCG: 50 INJECTION INTRAMUSCULAR; INTRAVENOUS at 17:01

## 2021-09-14 RX ADMIN — FENTANYL CITRATE 50 MCG: 50 INJECTION INTRAMUSCULAR; INTRAVENOUS at 17:20

## 2021-09-14 RX ADMIN — ROCURONIUM BROMIDE 10 MG: 50 INJECTION, SOLUTION INTRAVENOUS at 13:50

## 2021-09-14 RX ADMIN — INSULIN GLARGINE 15 UNITS: 100 INJECTION, SOLUTION SUBCUTANEOUS at 21:30

## 2021-09-14 RX ADMIN — PROTAMINE SULFATE 5 MG: 10 INJECTION, SOLUTION INTRAVENOUS at 17:07

## 2021-09-14 RX ADMIN — KETAMINE HYDROCHLORIDE 50 MG: 50 INJECTION, SOLUTION INTRAMUSCULAR; INTRAVENOUS at 10:32

## 2021-09-14 RX ADMIN — CHLORHEXIDINE GLUCONATE 15 ML: 1.2 SOLUTION ORAL at 05:40

## 2021-09-14 RX ADMIN — FENTANYL CITRATE 50 MCG: 50 INJECTION INTRAMUSCULAR; INTRAVENOUS at 13:28

## 2021-09-14 RX ADMIN — ROCURONIUM BROMIDE 20 MG: 50 INJECTION, SOLUTION INTRAVENOUS at 12:29

## 2021-09-14 RX ADMIN — PRAZOSIN HYDROCHLORIDE 1 MG: 1 CAPSULE ORAL at 21:30

## 2021-09-14 RX ADMIN — ETOMIDATE 14 MG: 20 INJECTION, SOLUTION INTRAVENOUS at 10:27

## 2021-09-14 RX ADMIN — FENTANYL CITRATE 50 MCG: 50 INJECTION INTRAMUSCULAR; INTRAVENOUS at 12:17

## 2021-09-14 RX ADMIN — ROCURONIUM BROMIDE 10 MG: 50 INJECTION, SOLUTION INTRAVENOUS at 15:33

## 2021-09-14 RX ADMIN — CYCLOBENZAPRINE HYDROCHLORIDE 5 MG: 5 TABLET, FILM COATED ORAL at 08:01

## 2021-09-14 RX ADMIN — HYDROMORPHONE HYDROCHLORIDE 0.5 MG: 1 INJECTION, SOLUTION INTRAMUSCULAR; INTRAVENOUS; SUBCUTANEOUS at 19:22

## 2021-09-14 RX ADMIN — FENTANYL CITRATE 50 MCG: 50 INJECTION INTRAMUSCULAR; INTRAVENOUS at 16:16

## 2021-09-14 RX ADMIN — HEPARIN SODIUM 1000 UNITS: 1000 INJECTION INTRAVENOUS; SUBCUTANEOUS at 14:47

## 2021-09-14 RX ADMIN — SODIUM CHLORIDE, SODIUM GLUCONATE, SODIUM ACETATE, POTASSIUM CHLORIDE, MAGNESIUM CHLORIDE, SODIUM PHOSPHATE, DIBASIC, AND POTASSIUM PHOSPHATE 100 ML/HR: .53; .5; .37; .037; .03; .012; .00082 INJECTION, SOLUTION INTRAVENOUS at 11:27

## 2021-09-14 RX ADMIN — EPHEDRINE SULFATE 10 MG: 50 INJECTION, SOLUTION INTRAVENOUS at 17:41

## 2021-09-14 RX ADMIN — FENTANYL CITRATE 50 MCG: 50 INJECTION INTRAMUSCULAR; INTRAVENOUS at 16:00

## 2021-09-14 RX ADMIN — PROTAMINE SULFATE 10 MG: 10 INJECTION, SOLUTION INTRAVENOUS at 17:27

## 2021-09-14 RX ADMIN — OXYCODONE HYDROCHLORIDE 10 MG: 10 TABLET ORAL at 02:46

## 2021-09-14 RX ADMIN — SODIUM CHLORIDE, SODIUM LACTATE, POTASSIUM CHLORIDE, AND CALCIUM CHLORIDE 75 ML/HR: .6; .31; .03; .02 INJECTION, SOLUTION INTRAVENOUS at 19:14

## 2021-09-14 RX ADMIN — FENTANYL CITRATE 50 MCG: 50 INJECTION INTRAMUSCULAR; INTRAVENOUS at 16:45

## 2021-09-14 RX ADMIN — GABAPENTIN 300 MG: 300 CAPSULE ORAL at 21:30

## 2021-09-14 RX ADMIN — LABETALOL 20 MG/4 ML (5 MG/ML) INTRAVENOUS SYRINGE 5 MG: at 17:51

## 2021-09-14 RX ADMIN — INSULIN HUMAN 10 UNITS: 100 INJECTION, SOLUTION PARENTERAL at 19:35

## 2021-09-14 RX ADMIN — HYDROMORPHONE HYDROCHLORIDE 0.5 MG: 1 INJECTION, SOLUTION INTRAMUSCULAR; INTRAVENOUS; SUBCUTANEOUS at 18:40

## 2021-09-14 RX ADMIN — FENTANYL CITRATE 50 MCG: 50 INJECTION INTRAMUSCULAR; INTRAVENOUS at 12:25

## 2021-09-14 RX ADMIN — FENTANYL CITRATE 50 MCG: 50 INJECTION INTRAMUSCULAR; INTRAVENOUS at 11:05

## 2021-09-14 RX ADMIN — SODIUM CHLORIDE, SODIUM LACTATE, POTASSIUM CHLORIDE, AND CALCIUM CHLORIDE: .6; .31; .03; .02 INJECTION, SOLUTION INTRAVENOUS at 10:21

## 2021-09-14 RX ADMIN — KETAMINE HYDROCHLORIDE 0.2 MG/KG/HR: 50 INJECTION, SOLUTION INTRAMUSCULAR; INTRAVENOUS at 10:33

## 2021-09-14 RX ADMIN — ROCURONIUM BROMIDE 10 MG: 50 INJECTION, SOLUTION INTRAVENOUS at 14:47

## 2021-09-14 RX ADMIN — HEPARIN SODIUM 13.1 UNITS/KG/HR: 10000 INJECTION, SOLUTION INTRAVENOUS at 02:47

## 2021-09-14 RX ADMIN — CALCIUM CHLORIDE 0.4 G: 100 INJECTION, SOLUTION INTRAVENOUS; INTRAVENTRICULAR at 17:41

## 2021-09-14 RX ADMIN — CHLORPROMAZINE HYDROCHLORIDE 100 MG: 25 TABLET, FILM COATED ORAL at 21:31

## 2021-09-14 RX ADMIN — SUGAMMADEX 200 MG: 100 INJECTION, SOLUTION INTRAVENOUS at 17:48

## 2021-09-14 RX ADMIN — FENTANYL CITRATE 50 MCG: 50 INJECTION INTRAMUSCULAR; INTRAVENOUS at 13:09

## 2021-09-14 RX ADMIN — SODIUM CHLORIDE, SODIUM GLUCONATE, SODIUM ACETATE, POTASSIUM CHLORIDE, MAGNESIUM CHLORIDE, SODIUM PHOSPHATE, DIBASIC, AND POTASSIUM PHOSPHATE 1000 ML: .53; .5; .37; .037; .03; .012; .00082 INJECTION, SOLUTION INTRAVENOUS at 16:30

## 2021-09-14 RX ADMIN — ACETAMINOPHEN 975 MG: 325 TABLET, FILM COATED ORAL at 10:13

## 2021-09-14 RX ADMIN — CALCIUM CHLORIDE 0.2 G: 100 INJECTION, SOLUTION INTRAVENOUS; INTRAVENTRICULAR at 16:15

## 2021-09-14 RX ADMIN — FENTANYL CITRATE 50 MCG: 50 INJECTION INTRAMUSCULAR; INTRAVENOUS at 17:12

## 2021-09-14 RX ADMIN — GABAPENTIN 300 MG: 300 CAPSULE ORAL at 08:01

## 2021-09-14 RX ADMIN — CLOPIDOGREL BISULFATE 75 MG: 75 TABLET ORAL at 08:01

## 2021-09-14 RX ADMIN — FENTANYL CITRATE 50 MCG: 50 INJECTION INTRAMUSCULAR; INTRAVENOUS at 13:03

## 2021-09-14 RX ADMIN — FENTANYL CITRATE 100 MCG: 50 INJECTION INTRAMUSCULAR; INTRAVENOUS at 11:53

## 2021-09-14 RX ADMIN — CALCIUM CHLORIDE 0.2 G: 100 INJECTION, SOLUTION INTRAVENOUS; INTRAVENTRICULAR at 16:51

## 2021-09-14 RX ADMIN — OXYCODONE HYDROCHLORIDE 10 MG: 10 TABLET ORAL at 21:39

## 2021-09-14 NOTE — ANESTHESIA POSTPROCEDURE EVALUATION
Post-Op Assessment Note    CV Status:  Stable  Pain Score: 6    Pain management: adequate     Mental Status:  Alert and awake   Hydration Status:  Euvolemic   PONV Controlled:  Controlled   Airway Patency:  Patent      Post Op Vitals Reviewed: Yes      Staff: Anesthesiologist         No complications documented  BP      Temp      Pulse     Resp      SpO2      Patient complaining of surgical site pain although noting mild-moderate quality  Plan on starting sub-anesthetic ketamine infusion to enhance post-operative analgesia  Dr Vinicius Jerez from vascular will place APS consult for post-operative pain management  Would reocmmend PO tylenol 1g q6, ketamine gtt, and opioids PRN  Please minimize opioids as possible to minimize tolerance and hyperalgesia

## 2021-09-14 NOTE — ASSESSMENT & PLAN NOTE
-BP has been well controlled , 132/98 this morning   -Continue lisinopril 40 mg, continue prazosin qhs    -Prazosin was held last night given patient is going to OR today

## 2021-09-14 NOTE — BRIEF OP NOTE (RAD/CATH)
Left Common Femoral Below Knee to Popliteal Bypass with Insitu GSV graft  Left Lower Extremity Angiogram  Postoperative Note  PATIENT NAME: Deepti Rodriguez  : 1985  MRN: [id number]  BE OR ROOM 07    Surgery Date: 2021    Preop Diagnosis:  Superficial femoral artery occlusion ([address] ) [I70 209]    Post-Op Diagnosis Codes:     * Superficial femoral artery occlusion (HCC) [I70 209]    Procedure(s) (LRB):  Left Common Femoral Below Knee to Popliteal Bypass with Insitu GSV graft  Left Lower Extremity Angiogram (Left)    Surgeon(s) and Role:     * Lis Garza MD - Primary     * Meliton Brown DO - [address] Son Joesph Sharma MD - Assisting    Specimens:  * No specimens in log *    Estimated Blood Loss:   200 mL    Anesthesia Type:   General     Findings:   Prior stent noted to be occluding profunda branch but there was large aberrant medial circumflex branch patent off of CFA  Completion angiogram demonstrates patent bypass, the AT and peroneal diminishes in mid-leg with PT primary runoff  +left PT signal at the end of the case      Complications:   None    SIGNATURE: Arleen Hardin MD   DATE: 2021   TIME: 6:13 PM

## 2021-09-14 NOTE — ASSESSMENT & PLAN NOTE
History of  Left leg atherosclerosis status post left SFA stent on 38/87, complicated by subsequent stent thrombosis and thrombectomy on 08/25 ,  Presenting to ED with severe left leg pain and numbness concerning for left SFA stent reocclusion most likely in the setting of  Plavix noncompliance  Pt was on PTA Xarelto and Plavix  Vascular surgery  and IR following, appreciate recommendations  Patient cleared by Cardiology for surgery  Patient is going to OR for femoral bypass scheduled today, 9/14  Pain management- Tylenol scheduled, Oxy 5 p r n  for moderate pain, Oxy 10 p r n  for severe pain, Dilaudid 1 mg IV q3 p r n   breakthrough pain, Gabapentin 300 TID, Cyclobenzaprine q8 prn

## 2021-09-14 NOTE — PROGRESS NOTES
[facility] [facility]  Progress Note - Mimi Hidden 1985, 30 y o  female MRN: [id number]  Unit/Bed#: [facility] 994-39 Encounter: [id number]  Primary Care Provider: Jadon Charlton DO   Date and time admitted to hospital: 9/8/2021  6:48 AM    Peripheral artery disease [facility])  Assessment & Plan  31 y/o F with DM, COPD, BPD, PAD w/ L SFA PTA/stent 8/11/2021, subsequent stent thrombectomy 8/25/2021, who presents with acute Left leg pain in setting of recurrent  L SFA stent occlusion  LEAD 9/8:  L NIKKI 0 41/0/0    Plan: To OR today for Left fem-pop bypass with GSV  NPO/IVF  Continue Heparin gtt, (Xarelto held)  Continue Plavix  Continue neurovascular monitoring  PRN analgesia        Subjective:  No events overnight    Vitals:  /98 (BP Location: Left arm)   Pulse 89   Temp 98 8 °F (37 1 °C) (Oral)   Resp 18   Ht 5' 1" (1 549 m)   Wt 97 5 kg (214 lb 15 2 oz)   LMP 08/22/2021   SpO2 97%   BMI 40 61 kg/m²     I/Os:  I/O last 3 completed shifts: In: 1355 8 [P O :1326; I V :29 8]  Out: 0   No intake/output data recorded  Lab Results and Cultures:   Lab Results   Component Value Date    WBC 7 34 09/14/2021    HGB 11 4 (L) 09/14/2021    HCT 32 8 (L) 09/14/2021    MCV 80 (L) 09/14/2021     09/14/2021     Lab Results   Component Value Date    CALCIUM 8 5 09/14/2021    K 4 3 09/14/2021    CO2 28 09/14/2021     09/14/2021    BUN 27 (H) 09/14/2021    CREATININE 0 76 09/14/2021     Lab Results   Component Value Date    INR 0 98 09/14/2021    INR 1 21 (H) 09/08/2021    INR 1 01 08/23/2021    PROTIME 12 6 09/14/2021    PROTIME 13 5 (H) 09/08/2021    PROTIME 13 3 08/23/2021        Blood Culture:   Lab Results   Component Value Date    BLOODCX No Growth After 5 Days  08/22/2021    BLOODCX No Growth After 5 Days   08/22/2021   ,   Urinalysis:   Lab Results   Component Value Date    COLORU Yellow 08/24/2021    CLARITYU Clear 08/24/2021    SPECGRAV 1 017 08/24/2021    PHUR 5 5 08/24/2021    PHUR 7 5 08/07/2020    LEUKOCYTESUR Negative 08/24/2021    NITRITE Negative 08/24/2021    GLUCOSEU 500 (1/2%) (A) 08/24/2021    KETONESU Negative 08/24/2021    BILIRUBINUR Negative 08/24/2021    BLOODU Moderate (A) 08/24/2021   ,   Urine Culture: No results found for: URINECX,   Wound Culure: No results found for: WOUNDCULT    Medications:  Current Facility-Administered Medications   Medication Dose Route Frequency    acetaminophen (TYLENOL) tablet 975 mg  975 mg Oral Q6H [address]    albuterol inhalation solution 2 5 mg  2 5 mg Nebulization Q6H PRN    atorvastatin (LIPITOR) tablet 40 mg  40 mg Oral Daily With Dinner    chlorproMAZINE (THORAZINE) tablet 100 mg  100 mg Oral HS    clopidogrel (PLAVIX) tablet 75 mg  75 mg Oral Daily    cyclobenzaprine (FLEXERIL) tablet 5 mg  5 mg Oral TID    diphenhydrAMINE (BENADRYL) tablet 25 mg  25 mg Oral Q6H PRN    gabapentin (NEURONTIN) capsule 300 mg  300 mg Oral TID    heparin (porcine) 25,000 units in 0 45% NaCl 250 mL infusion (premix)  3-30 Units/kg/hr (Order-Specific) Intravenous Titrated    heparin (porcine) injection 3,800 Units  3,800 Units Intravenous Q1H PRN    heparin (porcine) injection 7,600 Units  7,600 Units Intravenous Q1H PRN    HYDROmorphone (DILAUDID) injection 1 mg  1 mg Intravenous Q3H PRN    insulin glargine (LANTUS) subcutaneous injection 15 Units 0 15 mL  15 Units Subcutaneous HS    insulin lispro (HumaLOG) 100 units/mL subcutaneous injection 2-12 Units  2-12 Units Subcutaneous TID AC    lurasidone (LATUDA) tablet 80 mg  80 mg Oral Daily With Breakfast    nicotine (NICODERM CQ) 21 mg/24 hr TD 24 hr patch 1 patch  1 patch Transdermal Daily    oxyCODONE (ROXICODONE) immediate release tablet 10 mg  10 mg Oral Q4H PRN    oxyCODONE (ROXICODONE) IR tablet 5 mg  5 mg Oral Q4H PRN    polyethylene glycol (MIRALAX) packet 17 g  17 g Oral Daily PRN    prazosin (MINIPRESS) capsule 1 mg  1 mg Oral HS    sodium chloride 0 9 % infusion 100 mL/hr Intravenous Continuous       Physical Exam:    General appearance: alert and oriented, in no acute distress  Lungs: clear to auscultation bilaterally  Heart: S1, S2 normal  Abdomen: soft, non-tender; bowel sounds normal; no masses,  no organomegaly  Extremities: +M/S B/L but diminished on Left, Left lat foot wound dry    Pulse exam:  DP: Right: 2+ Left: non-palpable  PT: Right: 2+ Left: non-palpable      Tammy Nowak PA-C  9/14/2021

## 2021-09-14 NOTE — ASSESSMENT & PLAN NOTE
31 y/o F with DM, COPD, BPD, PAD w/ L SFA PTA/stent 8/11/2021, subsequent stent thrombectomy 8/25/2021, who presents with acute Left leg pain in setting of recurrent  L SFA stent occlusion  LEAD 9/8:  L NIKKI 0 41/0/0    Plan:   To OR today for Left fem-pop bypass with GSV  NPO/IVF  Continue Heparin gtt, (Xarelto held)  Continue Plavix  Continue neurovascular monitoring  PRN analgesia

## 2021-09-14 NOTE — ASSESSMENT & PLAN NOTE
Lab Results   Component Value Date    HGBA1C 8 5 (H) 09/08/2021       Recent Labs     09/13/21  1138 09/13/21  1618 09/13/21 2053 09/14/21  0613   POCGLU 83 162* 171* 163*     uncontrolled as outpatient   PTA Lantus 40 units q h s  Patient received 40 units Lantus and 2 units lispro over 24 hours  Glucose levels have ranged from 118 - 76 over the past 24 hrs  Decrease Lantus to 15 units last night in preparation for surgery  NPO at midnight  Continue on regimen of Lantus 40 units following surgery

## 2021-09-14 NOTE — PROGRESS NOTES
[address] [facility]  Progress Note - Hodan Daley 1985, 30 y o  female MRN: [id number]  Unit/Bed#: OR [place] Encounter: [id number]  Primary Care Provider: Elena Guerrero DO   Date and time admitted to hospital: 9/8/2021  6:48 AM    * Acute leg pain, left  Assessment & Plan  History of  Left leg atherosclerosis status post left SFA stent on 89/03, complicated by subsequent stent thrombosis and thrombectomy on 08/25 ,  Presenting to ED with severe left leg pain and numbness concerning for left SFA stent reocclusion most likely in the setting of  Plavix noncompliance  Pt was on PTA Xarelto and Plavix  Vascular surgery  and IR following, appreciate recommendations  Patient cleared by Cardiology for surgery  Patient is going to OR for femoral bypass scheduled today, 9/14  Pain management- Tylenol scheduled, Oxy 5 p r n  for moderate pain, Oxy 10 p r n  for severe pain, Dilaudid 1 mg IV q3 p r n   breakthrough pain, Gabapentin 300 TID, Cyclobenzaprine q8 prn  Peripheral artery disease (HCC)  Assessment & Plan   History of left leg atherosclerosis status post left SFA stent complicated by thrombosis, thrombectomy on 08/25, now with reocclusion of the L  SFA stent in the setting of most likely medication noncompliance   please refer to left leg pain for the plan    Bipolar disorder [facility])  Assessment & Plan  mood currently stable  PTA medications Thorazine 100 mg q h s , Latuda 80 mg, trazodone 200 mg,  Seroquel 300 mg daily   Resumed Thorazine, Latuda    continue to monitor mentation very closely    Nicotine dependence  Assessment & Plan  continue nicotine replacement   tobacco cessation counseling    COPD (chronic obstructive pulmonary disease) ([facility] [address] )  Assessment & Plan   unclear history of COPD   patient currently only on albuterol neb p r n  no other active inhalers noted   will continue to monitor    Type 2 diabetes mellitus with diabetic polyneuropathy, with long-term current use of insulin [facility])  Assessment & Plan  Lab Results   Component Value Date    HGBA1C 8 5 (H) 09/08/2021       Recent Labs     09/13/21  1138 09/13/21  1618 09/13/21 2053 09/14/21  0613   POCGLU 83 162* 171* 163*     uncontrolled as outpatient   PTA Lantus 40 units q h s  Patient received 40 units Lantus and 2 units lispro over 24 hours  Glucose levels have ranged from 118 - 76 over the past 24 hrs  Decrease Lantus to 15 units last night in preparation for surgery  NPO at midnight  Continue on regimen of Lantus 40 units following surgery  Hypertension  Assessment & Plan  -BP has been well controlled , 132/98 this morning   -Continue lisinopril 40 mg, continue prazosin qhs    -Prazosin was held last night given patient is going to OR today  VTE Pharmacologic Prophylaxis: Heparin  VTE Mechanical Prophylaxis: sequential compression device  Diet:       Diet Orders   (From admission, onward)             Start     Ordered    09/14/21 0001  Diet NPO; Sips with meds  Diet effective midnight     Question Answer Comment   Diet Type NPO    NPO Except: Sips with meds    RD to adjust diet per protocol? Yes        09/13/21 0556              Code Status:  Level 1  Disposition:continue inpatient care    Discussed with attending physician, Dr Jeremy Carr, and [facility] team     Subjective:   Patient is lying in bed, complains of mild headache and pain in her left leg  She denies nausea, vomiting, chest pain, shortness of breath palpitations, abdominal pain  She had bowel movement yesterday      Objective:   Current Facility-Administered Medications   Medication Dose Route Frequency    [MAR Hold] acetaminophen (TYLENOL) tablet 975 mg  975 mg Oral Q6H [facility]    [MAR Hold] albuterol inhalation solution 2 5 mg  2 5 mg Nebulization Q6H PRN    [MAR Hold] atorvastatin (LIPITOR) tablet 40 mg  40 mg Oral Daily With Dinner    [MAR Hold] chlorproMAZINE (THORAZINE) tablet 100 mg  100 mg Oral HS    [MAR Hold] clopidogrel (PLAVIX) tablet 75 mg  75 mg Oral Daily    [MAR Hold] cyclobenzaprine (FLEXERIL) tablet 5 mg  5 mg Oral TID    [MAR Hold] diphenhydrAMINE (BENADRYL) tablet 25 mg  25 mg Oral Q6H PRN    [MAR Hold] gabapentin (NEURONTIN) capsule 300 mg  300 mg Oral TID    heparin (porcine) 25,000 units in 0 45% NaCl 250 mL infusion (premix)  3-30 Units/kg/hr (Order-Specific) Intravenous Titrated    [MAR Hold] heparin (porcine) injection 3,800 Units  3,800 Units Intravenous Q1H PRN    [MAR Hold] heparin (porcine) injection 7,600 Units  7,600 Units Intravenous Q1H PRN    [MAR Hold] HYDROmorphone (DILAUDID) injection 1 mg  1 mg Intravenous Q3H PRN    [MAR Hold] insulin glargine (LANTUS) subcutaneous injection 15 Units 0 15 mL  15 Units Subcutaneous HS    [MAR Hold] insulin lispro (HumaLOG) 100 units/mL subcutaneous injection 2-12 Units  2-12 Units Subcutaneous TID AC    [MAR Hold] lurasidone (LATUDA) tablet 80 mg  80 mg Oral Daily With Breakfast    [MAR Hold] nicotine (NICODERM CQ) 21 mg/24 hr TD 24 hr patch 1 patch  1 patch Transdermal Daily    [MAR Hold] oxyCODONE (ROXICODONE) immediate release tablet 10 mg  10 mg Oral Q4H PRN    [MAR Hold] oxyCODONE (ROXICODONE) IR tablet 5 mg  5 mg Oral Q4H PRN    [MAR Hold] polyethylene glycol (MIRALAX) packet 17 g  17 g Oral Daily PRN    [MAR Hold] prazosin (MINIPRESS) capsule 1 mg  1 mg Oral HS    sodium chloride 0 9 % infusion  100 mL/hr Intravenous Continuous     Facility-Administered Medications Ordered in Other Encounters   Medication Dose Route Frequency    dexamethasone (PF) (DECADRON) injection   Intravenous PRN    esmolol (BREVIBLOC) IV bolus   Intravenous PRN    etomidate (AMIDATE) 2 mg/mL injection   Intravenous PRN    fentanyl citrate (PF) 100 MCG/2ML   Intravenous PRN    ketamine (KETALAR)   Intravenous PRN    ketamine (KETALAR)   Intravenous Continuous PRN    lactated ringers infusion   Intravenous Continuous PRN    midazolam (VERSED) injection   Intravenous PRN    phenylephrine (TAE-SYNEPHRINE) 50 mg (STANDARD CONCENTRATION) in sodium chloride 0 9% 250 mL   Intravenous Continuous PRN    phenylephrine bolus from bag   Intravenous PRN    propofol (DIPRIVAN) 200 MG/20ML bolus injection   Intravenous PRN    ROCuronium (ZEMURON) injection   Intravenous PRN     No Known Allergies    Labs:  Results from last 7 days   Lab Units 09/14/21  0548 09/11/21  0638 09/09/21  0610 09/08/21  0448   WBC Thousand/uL 7 34 7 28 8 76 11 07*   HEMOGLOBIN g/dL 11 4* 10 5* 10 4* 10 9*   HEMATOCRIT % 32 8* 30 0* 29 0* 30 1*   PLATELETS Thousands/uL 281 299 323 374   NEUTROS PCT % 52  --   --  82*   MONOS PCT % 8  --   --  4     Results from last 7 days   Lab Units 09/14/21  0548 09/11/21  0638 09/10/21  0533 09/08/21  0447   POTASSIUM mmol/L 4 3 3 6 3 6 3 1*   CHLORIDE mmol/L 105 105 106 102   CO2 mmol/L 28 26 11* 27   BUN mg/dL 27* 22 28* 18   CREATININE mg/dL 0 76 0 74 0 57* 1 08   CALCIUM mg/dL 8 5 8 2* 7 9* 8 8   ALK PHOS U/L  --   --   --  76 9   ALT U/L  --   --   --  16   AST U/L  --   --   --  11*     Results from last 7 days   Lab Units 09/08/21  0447   MAGNESIUM mg/dL 1 5*     Lab Results   Component Value Date    PHOS 3 4 08/23/2021      Results from last 7 days   Lab Units 09/14/21  0548 09/13/21  0601 09/12/21  1901 09/08/21  0448   INR  0 98  --   --  1 21*   PTT seconds 76* 76* 78* 31     Results from last 7 days   Lab Units 09/08/21  0447   LACTIC ACID mmol/L 2 7*     0   Lab Value Date/Time    TROPONINI <0 03 08/22/2021 1518    TROPONINI <0 02 08/07/2020 0916    TROPONINI <0 02 08/07/2020 0527    TROPONINI <0 02 08/06/2020 2143     ABG:No results found for: PHART, XUL5AWP, PO2ART, HMH1TYS, C8NVSRRV, BEART, SOURCE     Imaging/Other:        Vitals:  Vitals:    09/13/21 1900 09/13/21 2227 09/14/21 0803 09/14/21 0956   BP:  133/98     BP Location:  Left arm     Pulse:  89 85    Resp:  18     Temp:  98 8 °F (37 1 °C)  (!) 96 6 °F (35 9 °C)   TempSrc:  Oral     SpO2: 95% 97% 100% Weight:       Height:             Intake/Output Summary (Last 24 hours) at 9/14/2021 1251  Last data filed at 9/14/2021 1240  Gross per 24 hour   Intake 708 ml   Output 385 ml   Net 323 ml       Physical Exam:   General: No acute distress  HEENT  Head: NC/AT  Eyes: Pupils equal and reactive to light  Mouth: Mucus membranes moist  Neck: No lymphadenopathy   Cardio: Normal S1 S2, regular rate and rhythm  Resp: Good respiratory effort, lungs clear to auscultation bilaterally  Abdomen: Soft, non distended, non tender to palpation, normal active bowel sounds  Extremities: No LE edema  Skin: Warm, dry, no rash  Neuro: oriented x3, 5/5 strength in UE and LE right side, 5/5LUE, refused to move left leg due to pain  Psych: normal behavior and effect      Invasive Devices     Peripherally Inserted Central Catheter Line            PICC Line 48/10/90 Left Basilic 5 days          Peripheral Intravenous Line            Peripheral IV 09/14/21 Right Antecubital <1 day    Peripheral IV 09/14/21 Right Hand <1 day          Arterial Line            Arterial Line 09/14/21 Left Radial <1 day          Drain            Urethral Catheter Latex 16 Fr  <1 day          Airway            ETT  Cuffed;Oral;Inflated 7 mm <1 day                  KARSON NEWELL, PGY-3  [address]

## 2021-09-15 LAB
ABO GROUP BLD BPU: NORMAL
ANION GAP SERPL CALCULATED.3IONS-SCNC: 5 MMOL/L (ref 4–13)
APTT PPP: 153 SECONDS (ref 23–37)
APTT PPP: 30 SECONDS (ref 23–37)
BPU ID: NORMAL
BUN SERPL-MCNC: 18 MG/DL (ref 5–25)
CALCIUM SERPL-MCNC: 7.6 MG/DL (ref 8.3–10.1)
CHLORIDE SERPL-SCNC: 106 MMOL/L (ref 100–108)
CO2 SERPL-SCNC: 26 MMOL/L (ref 21–32)
CREAT SERPL-MCNC: 0.75 MG/DL (ref 0.6–1.3)
CROSSMATCH: NORMAL
ERYTHROCYTE [DISTWIDTH] IN BLOOD BY AUTOMATED COUNT: 13.5 % (ref 11.6–15.1)
GFR SERPL CREATININE-BSD FRML MDRD: 120 ML/MIN/1.73SQ M
GLUCOSE SERPL-MCNC: 217 MG/DL (ref 65–140)
GLUCOSE SERPL-MCNC: 238 MG/DL (ref 65–140)
GLUCOSE SERPL-MCNC: 252 MG/DL (ref 65–140)
GLUCOSE SERPL-MCNC: 266 MG/DL (ref 65–140)
GLUCOSE SERPL-MCNC: 340 MG/DL (ref 65–140)
HCT VFR BLD AUTO: 22.5 % (ref 34.8–46.1)
HGB BLD-MCNC: 7.9 G/DL (ref 11.5–15.4)
MCH RBC QN AUTO: 28 PG (ref 26.8–34.3)
MCHC RBC AUTO-ENTMCNC: 35.1 G/DL (ref 31.4–37.4)
MCV RBC AUTO: 80 FL (ref 82–98)
PLATELET # BLD AUTO: 212 THOUSANDS/UL (ref 149–390)
PMV BLD AUTO: 10.6 FL (ref 8.9–12.7)
POTASSIUM SERPL-SCNC: 3.9 MMOL/L (ref 3.5–5.3)
RBC # BLD AUTO: 2.82 MILLION/UL (ref 3.81–5.12)
SODIUM SERPL-SCNC: 137 MMOL/L (ref 136–145)
UNIT DISPENSE STATUS: NORMAL
UNIT PRODUCT CODE: NORMAL
UNIT PRODUCT VOLUME: 350 ML
UNIT RH: NORMAL
WBC # BLD AUTO: 6.77 THOUSAND/UL (ref 4.31–10.16)

## 2021-09-15 PROCEDURE — 93005 ELECTROCARDIOGRAM TRACING: CPT

## 2021-09-15 PROCEDURE — 97166 OT EVAL MOD COMPLEX 45 MIN: CPT

## 2021-09-15 PROCEDURE — 99232 SBSQ HOSP IP/OBS MODERATE 35: CPT | Performed by: INTERNAL MEDICINE

## 2021-09-15 PROCEDURE — 80048 BASIC METABOLIC PNL TOTAL CA: CPT | Performed by: SURGERY

## 2021-09-15 PROCEDURE — 82948 REAGENT STRIP/BLOOD GLUCOSE: CPT

## 2021-09-15 PROCEDURE — 99232 SBSQ HOSP IP/OBS MODERATE 35: CPT | Performed by: NURSE PRACTITIONER

## 2021-09-15 PROCEDURE — 85027 COMPLETE CBC AUTOMATED: CPT | Performed by: SURGERY

## 2021-09-15 PROCEDURE — 85730 THROMBOPLASTIN TIME PARTIAL: CPT | Performed by: FAMILY MEDICINE

## 2021-09-15 PROCEDURE — 99024 POSTOP FOLLOW-UP VISIT: CPT | Performed by: SURGERY

## 2021-09-15 PROCEDURE — 94760 N-INVAS EAR/PLS OXIMETRY 1: CPT

## 2021-09-15 PROCEDURE — 99232 SBSQ HOSP IP/OBS MODERATE 35: CPT | Performed by: FAMILY MEDICINE

## 2021-09-15 PROCEDURE — 97163 PT EVAL HIGH COMPLEX 45 MIN: CPT

## 2021-09-15 RX ORDER — ONDANSETRON 2 MG/ML
4 INJECTION INTRAMUSCULAR; INTRAVENOUS EVERY 6 HOURS PRN
Status: DISCONTINUED | OUTPATIENT
Start: 2021-09-15 | End: 2021-09-17 | Stop reason: HOSPADM

## 2021-09-15 RX ORDER — HYDROMORPHONE HYDROCHLORIDE 2 MG/1
2 TABLET ORAL EVERY 4 HOURS PRN
Status: DISCONTINUED | OUTPATIENT
Start: 2021-09-15 | End: 2021-09-17 | Stop reason: HOSPADM

## 2021-09-15 RX ORDER — HYDROMORPHONE HYDROCHLORIDE 2 MG/1
4 TABLET ORAL EVERY 4 HOURS PRN
Status: DISCONTINUED | OUTPATIENT
Start: 2021-09-15 | End: 2021-09-17 | Stop reason: HOSPADM

## 2021-09-15 RX ORDER — INSULIN GLARGINE 100 [IU]/ML
40 INJECTION, SOLUTION SUBCUTANEOUS
Status: DISCONTINUED | OUTPATIENT
Start: 2021-09-15 | End: 2021-09-16

## 2021-09-15 RX ORDER — ASPIRIN 81 MG/1
81 TABLET, CHEWABLE ORAL DAILY
Status: DISCONTINUED | OUTPATIENT
Start: 2021-09-15 | End: 2021-09-17 | Stop reason: HOSPADM

## 2021-09-15 RX ORDER — AMOXICILLIN 250 MG
2 CAPSULE ORAL
Status: DISCONTINUED | OUTPATIENT
Start: 2021-09-15 | End: 2021-09-17 | Stop reason: HOSPADM

## 2021-09-15 RX ADMIN — INSULIN LISPRO 6 UNITS: 100 INJECTION, SOLUTION INTRAVENOUS; SUBCUTANEOUS at 11:49

## 2021-09-15 RX ADMIN — HYDROMORPHONE HYDROCHLORIDE 4 MG: 2 TABLET ORAL at 18:16

## 2021-09-15 RX ADMIN — HYDROMORPHONE HYDROCHLORIDE 4 MG: 2 TABLET ORAL at 22:40

## 2021-09-15 RX ADMIN — HYDROMORPHONE HYDROCHLORIDE 1 MG: 1 INJECTION, SOLUTION INTRAMUSCULAR; INTRAVENOUS; SUBCUTANEOUS at 11:48

## 2021-09-15 RX ADMIN — HEPARIN SODIUM 18 UNITS/KG/HR: 10000 INJECTION, SOLUTION INTRAVENOUS at 00:18

## 2021-09-15 RX ADMIN — RIVAROXABAN 2.5 MG: 2.5 TABLET, FILM COATED ORAL at 11:49

## 2021-09-15 RX ADMIN — CYCLOBENZAPRINE HYDROCHLORIDE 5 MG: 5 TABLET, FILM COATED ORAL at 21:26

## 2021-09-15 RX ADMIN — INSULIN LISPRO 6 UNITS: 100 INJECTION, SOLUTION INTRAVENOUS; SUBCUTANEOUS at 17:15

## 2021-09-15 RX ADMIN — GABAPENTIN 300 MG: 300 CAPSULE ORAL at 21:26

## 2021-09-15 RX ADMIN — ACETAMINOPHEN 975 MG: 325 TABLET, FILM COATED ORAL at 11:48

## 2021-09-15 RX ADMIN — KETAMINE HYDROCHLORIDE 0.2 MG/KG/HR: 50 INJECTION, SOLUTION INTRAMUSCULAR; INTRAVENOUS at 19:14

## 2021-09-15 RX ADMIN — ASPIRIN 81 MG CHEWABLE TABLET 81 MG: 81 TABLET CHEWABLE at 10:02

## 2021-09-15 RX ADMIN — ACETAMINOPHEN 975 MG: 325 TABLET, FILM COATED ORAL at 05:49

## 2021-09-15 RX ADMIN — CYCLOBENZAPRINE HYDROCHLORIDE 5 MG: 5 TABLET, FILM COATED ORAL at 15:55

## 2021-09-15 RX ADMIN — NICOTINE 1 PATCH: 21 PATCH, EXTENDED RELEASE TRANSDERMAL at 10:02

## 2021-09-15 RX ADMIN — PRAZOSIN HYDROCHLORIDE 1 MG: 1 CAPSULE ORAL at 21:25

## 2021-09-15 RX ADMIN — CHLORPROMAZINE HYDROCHLORIDE 100 MG: 25 TABLET, FILM COATED ORAL at 21:26

## 2021-09-15 RX ADMIN — SENNOSIDES AND DOCUSATE SODIUM 2 TABLET: 8.6; 5 TABLET ORAL at 21:26

## 2021-09-15 RX ADMIN — CYCLOBENZAPRINE HYDROCHLORIDE 5 MG: 5 TABLET, FILM COATED ORAL at 10:03

## 2021-09-15 RX ADMIN — GABAPENTIN 300 MG: 300 CAPSULE ORAL at 10:02

## 2021-09-15 RX ADMIN — RIVAROXABAN 2.5 MG: 2.5 TABLET, FILM COATED ORAL at 15:55

## 2021-09-15 RX ADMIN — HYDROMORPHONE HYDROCHLORIDE 1 MG: 1 INJECTION, SOLUTION INTRAMUSCULAR; INTRAVENOUS; SUBCUTANEOUS at 21:28

## 2021-09-15 RX ADMIN — OXYCODONE HYDROCHLORIDE 10 MG: 10 TABLET ORAL at 09:46

## 2021-09-15 RX ADMIN — INSULIN LISPRO 4 UNITS: 100 INJECTION, SOLUTION INTRAVENOUS; SUBCUTANEOUS at 10:06

## 2021-09-15 RX ADMIN — HYDROMORPHONE HYDROCHLORIDE 1 MG: 1 INJECTION, SOLUTION INTRAMUSCULAR; INTRAVENOUS; SUBCUTANEOUS at 15:55

## 2021-09-15 RX ADMIN — ACETAMINOPHEN 975 MG: 325 TABLET, FILM COATED ORAL at 17:15

## 2021-09-15 RX ADMIN — LURASIDONE HYDROCHLORIDE 80 MG: 80 TABLET, FILM COATED ORAL at 10:05

## 2021-09-15 RX ADMIN — GABAPENTIN 300 MG: 300 CAPSULE ORAL at 15:55

## 2021-09-15 RX ADMIN — ATORVASTATIN CALCIUM 40 MG: 40 TABLET, FILM COATED ORAL at 15:55

## 2021-09-15 RX ADMIN — OXYCODONE HYDROCHLORIDE 10 MG: 10 TABLET ORAL at 14:03

## 2021-09-15 RX ADMIN — INSULIN GLARGINE 40 UNITS: 100 INJECTION, SOLUTION SUBCUTANEOUS at 21:25

## 2021-09-15 NOTE — PROGRESS NOTES
Hematology & Medical Oncology Progress Note     Name: Randell Fry   Age & Sex: 30 y o  female   MRN: [id number]  Unit/Bed#: [facility] 511-01   Encounter: [id number]    PATIENT INFORMATION     Name: Randell Fry   Age & Sex: 30 y o  female   MRN: [id number]  Hospital Stay Days: 6    ASSESSMENT/PLAN     1  Recurrent SFA stent thrombosis: 30year old female with recurrent arterial stent thrombosis in the setting of L SFA stent placement due to atherosclerosis  She is now POD #1 from a L femoral-popliteal bypass with improvement in her symptoms post-operatively  Continue to suspect that these recurrent thromboses are due to the Clopidogrel non-adherence due to a misunderstanding versus a true medication failure versus pro-thrombotic conditions  A thrombosis panel in the setting of acute thrombosis is not recommended, although she will continue to require anticoagulation until she can follow up with our office in the outpatient setting as a pro-thrombotic condition cannot be ruled out at this time given the recurrent nature and a slight family history     Plan:  · Continue heparin gtt and Plavix per primary team  Vascular surgery has is on board and further recommendations are appreciated  · Would recommend transitioning back to Rivaroxaban prior to discharge as well as continuing anti-platelet medication  Would recommend DOAC  at a therapeutic dose for 10 days since admission before it can be transitioned to PPx dosing  · Pt to see Dr Daniel Restrepo 10/7 in follow up given her complicated history  I did review this patient with Violet Marrufo and he is in agreement with this plan  Layne Montoya, [facility]  Hematology/Oncology Nurse Practitioner         SUBJECTIVE     Patient seen and examined  No acute events overnight  Ms Barbara Angel is now POD #1 from a left femoral-popliteal bypass procedure   Today, she reports that she still has significant pain in the left lower extremity but that it has improved since prior procedure  She also expressed that she now has sensation in the left leg and is able to move her toes which she was unable to do prior  Denies any fever, chills, nausea, vomiting, abdominal pain, or shortness of breath  Denies any bleeding  Reports that her appetite has been good  No further complaints  OBJECTIVE     Vitals:    09/15/21 0302 09/15/21 0741 09/15/21 0744 09/15/21 1107   BP: 121/64  104/56 137/72   BP Location: Left arm  Right arm    Pulse: 102  98    Resp: 18  16 18   Temp:   98 2 °F (36 8 °C) (!) 97 4 °F (36 3 °C)   TempSrc:   Oral    SpO2: 98% 99% 98%    Weight:       Height:          Temperature:   Temp (24hrs), Av °F (36 7 °C), Min:97 4 °F (36 3 °C), Max:98 6 °F (37 °C)    Temperature: (!) 97 4 °F (36 3 °C)    Physical Exam  Constitutional:       General: She is awake  Appearance: She is not ill-appearing  HENT:      Head: Normocephalic and atraumatic  Nose: Nose normal    Eyes:      Conjunctiva/sclera: Conjunctivae normal    Cardiovascular:      Rate and Rhythm: Normal rate and regular rhythm  Pulmonary:      Effort: Pulmonary effort is normal       Breath sounds: Normal breath sounds  Abdominal:      General: Bowel sounds are normal       Palpations: Abdomen is soft  Tenderness: There is no abdominal tenderness  Skin:     General: Skin is warm  Comments: Wrap with dressing in place on the left lower extremity  Lower extremities warm to the touch bilaterally   Neurological:      Mental Status: She is alert and oriented to person, place, and time  Comments: Improved sensation to the LLE with patient now able to respond to palpation  Further able to move toes on exam today which she was unable to do prior to procedure  Psychiatric:         Behavior: Behavior is cooperative  LABORATORY DATA     Labs: I have personally reviewed pertinent reports    Results from last 7 days   Lab Units 09/15/21  0728 21  1740 21  1657 21  7037 09/11/21  0638 09/11/21  0638   WBC Thousand/uL 6 77  --   --  7 34  --  7 28   HEMOGLOBIN g/dL 7 9*  --   --  11 4*  --  10 5*   I STAT HEMOGLOBIN g/dl  --  9 2* 8 8*  --    < >  --    HEMATOCRIT % 22 5*  --   --  32 8*  --  30 0*   HEMATOCRIT, ISTAT %  --  27* 26*  --    < >  --    PLATELETS Thousands/uL 212  --   --  281  --  299   NEUTROS PCT %  --   --   --  52  --   --    MONOS PCT %  --   --   --  8  --   --     < > = values in this interval not displayed  Results from last 7 days   Lab Units 09/15/21  0728 09/14/21  1740 09/14/21  1657 09/14/21  1527 09/14/21  0548 09/11/21  0651 09/11/21  6085   POTASSIUM mmol/L 3 9  --   --   --  4 3  --  3 6   CHLORIDE mmol/L 106  --   --   --  105  --  105   CO2 mmol/L 26  --   --   --  28  --  26   CO2, I-STAT mmol/L  --  26 25 26  --    < >  --    BUN mg/dL 18  --   --   --  27*  --  22   CREATININE mg/dL 0 75  --   --   --  0 76  --  0 74   CALCIUM mg/dL 7 6*  --   --   --  8 5  --  8 2*   GLUCOSE, ISTAT mg/dl  --  177* 173* 162*  --    < >  --     < > = values in this interval not displayed  Results from last 7 days   Lab Units 09/15/21  0728 09/15/21  0039 09/14/21  0548   INR   --   --  0 98   PTT seconds 153* 30 76*       IMAGING & DIAGNOSTIC TESTING     Radiology Results: I have personally reviewed pertinent reports  No results found  Other Diagnostic Testing: I have personally reviewed pertinent reports      ACTIVE MEDICATIONS     Current Facility-Administered Medications   Medication Dose Route Frequency    acetaminophen (TYLENOL) tablet 975 mg  975 mg Oral Q6H [facility]    albuterol inhalation solution 2 5 mg  2 5 mg Nebulization Q6H PRN    aspirin chewable tablet 81 mg  81 mg Oral Daily    atorvastatin (LIPITOR) tablet 40 mg  40 mg Oral Daily With Dinner    chlorproMAZINE (THORAZINE) tablet 100 mg  100 mg Oral HS    cyclobenzaprine (FLEXERIL) tablet 5 mg  5 mg Oral TID    diphenhydrAMINE (BENADRYL) tablet 25 mg  25 mg Oral Q6H PRN    gabapentin (NEURONTIN) capsule 300 mg  300 mg Oral TID    HYDROmorphone (DILAUDID) injection 1 mg  1 mg Intravenous Q3H PRN    HYDROmorphone (DILAUDID) tablet 2 mg  2 mg Oral Q4H PRN    HYDROmorphone (DILAUDID) tablet 4 mg  4 mg Oral Q4H PRN    insulin glargine (LANTUS) subcutaneous injection 40 Units 0 4 mL  40 Units Subcutaneous HS    insulin lispro (HumaLOG) 100 units/mL subcutaneous injection 2-12 Units  2-12 Units Subcutaneous TID AC    ketamine 250 mg (STANDARD CONCENTRATION) IV in sodium chloride 0 9% 250 mL  0 2 mg/kg/hr Intravenous Continuous    lurasidone (LATUDA) tablet 80 mg  80 mg Oral Daily With Breakfast    nicotine (NICODERM CQ) 21 mg/24 hr TD 24 hr patch 1 patch  1 patch Transdermal Daily    polyethylene glycol (MIRALAX) packet 17 g  17 g Oral Daily PRN    prazosin (MINIPRESS) capsule 1 mg  1 mg Oral HS    rivaroxaban (XARELTO) tablet 2 5 mg  2 5 mg Oral BID With Meals    senna-docusate sodium (SENOKOT S) 8 6-50 mg per tablet 2 tablet  2 tablet Oral HS       Portions of the record may have been created with voice recognition software  Occasional wrong word or "sound a like" substitutions may have occurred due to the inherent limitations of voice recognition software    Read the chart carefully and recognize, using context, where substitutions have occurred   ==    [address] [employer]

## 2021-09-15 NOTE — PROGRESS NOTES
VASCULAR SURGERY FREE TEXT PROGRESS NOTE      Patient seen and examined due to wound bleeding  Upon evaluation the mepiliex just below her inguinal crease was blood stained, there was no active bleeding    A dry 4x4 gauze with tape was applied after the mepilex dressing was removed      Of note the patient has robust doppler signals in DP and PT, additionally she states that she can feel me touching her foot (previously unable to sense touch of her foot)

## 2021-09-15 NOTE — PHYSICAL THERAPY NOTE
Physical Therapy Evaluation     Patient's Name: Elisa Key    Admitting Diagnosis  Numbness [R20 0]    Problem List  Patient Active Problem List   Diagnosis    Encounter for gynecological examination without abnormal finding    Possible exposure to STD    Headache    Hypertension    Type 2 diabetes mellitus with diabetic polyneuropathy, with long-term current use of insulin ([place] [address] )    Hypertensive urgency    Paresthesia    COPD [facility]chronic obstructive [facility] disease) [facility]    Nicotine dependence    Bipolar disorder ([place] [address] )    Syncope    Cellulitis    Toe infection    Toe pain, left    Deliberate self-cutting    Left foot pain    COVID-19    Diarrhea    Peripheral artery disease ([place] [address] )    Body mass index (BMI)40 0-44 9, adult ([facility])    Bursitis of left knee    Superficial femoral artery occlusion ([facility])    Acute pain of left knee    Acute leg pain, left       Past Medical History  Past Medical History:   Diagnosis Date    Asthma     Bipolar 1 disorder ([place] [address] )     COPD (chronic obstructive pulmonary disease) ([place] [address] )     Depression     Diabetes mellitus ([place] [address] )     Hypertension     Psychiatric disorder     cutting history    PTSD (post-traumatic stress disorder)     Tendonitis        Past Surgical History  Past Surgical History:   Procedure Laterality Date    BYPASS FEMORAL-POPLITEAL Left 2021    Procedure: Left Common Femoral Below Knee to Popliteal Bypass with Insitu GSV graft  Left Lower Extremity Angiogram;  Surgeon: Deborah Lauren MD;  [facility];  Service: Vascular     SECTION      EAR SURGERY      IR LOWER EXTREMITY ANGIOGRAM  2021    IR LOWER EXTREMITY ANGIOGRAM  2021          09/15/21 1331   PT Last Visit   PT Visit Date 09/15/21   Note Type   Note type Evaluation   Pain Assessment   Pain Assessment Tool 0-10   Pain Score Worst Possible Pain   Pain Location/Orientation Orientation: Left; Location: Leg   Home Living   Type of Home Other (Comment)  (rents a 5th floor room in a hotel)   Home Layout One level  (elevator to 4th floor and then full flight+ to room)   Bathroom Shower/Tub   (shared bathroom)   Bathroom Equipment   (denies)   Home Equipment   (denies)   Prior Function   Level of [place] Independent with ADLs and functional mobility  (but reports recent difficulty)   Lives With Alone   ADL Assistance Independent   IADLs Needs assistance   Falls in the last 6 months 0   Comments Pt reports wife visits every night to assist   Restrictions/Precautions   Weight Bearing Precautions Per Order No   Other Precautions Chair Alarm; Bed Alarm;Cognitive;Multiple lines;Telemetry; Fall Risk;Pain   General   Family/Caregiver Present Yes   Cognition   Arousal/Participation Alert   Orientation Level Oriented X4   Memory Decreased recall of precautions   Following Commands Follows one step commands with increased time or repetition   Comments Pt presents very impulsive  Pt with decreased safety awareness and insight into deficits  Pt distracted by pain  RLE Assessment   RLE Assessment   (functionally 4/5)   LLE Assessment   LLE Assessment   (unable to assess 2/2 pain)   Bed Mobility   Supine to Sit 5  Supervision   Additional items HOB elevated; Bedrails   Sit to Supine 5  Supervision   Additional items HOB elevated; Bedrails   Additional Comments Supine in bed upon PT arrival   Pt left supine in bed with bed alarm intact and all needs in reach  Transfers   Sit to Stand 5  Supervision   Additional items Verbal cues; Impulsive   Stand to Sit 5  Supervision   Additional items Impulsive;Verbal cues   Additional Comments Transfers with RW   VC for hand placement, safety, sequecing  Ambulation/Elevation   Gait pattern Excessively slow; Step to;Short stride;Decreased L stance;Decreased foot clearance; Improper Weight shift   Gait Assistance 5  Supervision   Additional items   (assist of second and third for chair follow and lines)   Assistive Device Rolling walker   Distance 35 ft x 2   Balance   Static Sitting Fair   Dynamic Sitting Fair   Static Standing Fair -   Dynamic Standing Fair -   Ambulatory Poor +   Endurance Deficit   Endurance Deficit Yes   Endurance Deficit Description fatigue, weakness, pain, light headedness   Activity Tolerance   Activity Tolerance Patient limited by fatigue;Patient limited by pain; Other (Comment)  (light headedness)   Medical Staff Made Aware OT, OTS   Nurse Made Aware RN cleared pt to be seen by PT   Assessment   Prognosis Good   Problem List Decreased strength;Decreased endurance; Impaired balance;Decreased mobility; Decreased safety awareness;Pain   Assessment Pt seen for high complexity PT evaluation due to decrease in functional mobility status compared to baseline  Pt with active PT eval/treat and up in chair orders at this time  Pt is a 30 y o  F who [facility] with acute leg pain on 9/8/21  Pt is s/p L CFA to BK-pop bypass on 9/14/21  Pt  has a past medical history of Asthma, Bipolar 1 disorder ([facility] [address] ), COPD (chronic obstructive pulmonary disease) ([address] ), Depression, Diabetes mellitus ([address] ), Hypertension, Psychiatric disorder, PTSD (post-traumatic stress disorder), and Tendonitis  Pt resides alone in 5th floor hotel room with elevator+full flight steps to enter  Pt presents with decreased strength, balance, endurance that contribute to limitations in bed mobility, functional transfers, functional mobility  Pt requires supervision for all mobility at this time  Pt left supine in bed with bed alarm intact and with all needs in reach  Pt will benefit from skilled therapy in order to address current impairments and functional limitations  PT to follow pt and recommending home pending progress    The patient's AM-PAC Basic Mobility Inpatient Short Form Raw Score is 18, Standardized Score is 41 05  A standardized score less than 42 9 suggests the patient may benefit from discharge to post-acute rehabilitation services  Please also refer to the recommendation of the Physical Therapist for safe discharge planning  Anticipate pt to progress to DC to home  Barriers to Discharge Inaccessible home environment   Goals   Patient Goals to have less pain   STG Expiration Date 09/29/21   Short Term Goal #1 1  Pt will demonstrate ability to perform all aspects of bed mobility with I in order to increase independence and decrease burden on caregivers  2  Pt will demonstrate ability to perform functional transfers with Mod I in order to increase independence and decrease burden on caregivers  3  Pt will demonstrate ability to ambulate 200 ft with least restrictive AD with Mod I in order to return to mobility safely  4  Pt will demonstrate ability to negotiate full flight steps with/without HR and Mod I in order to return to household/community mobility safely  5  Pt will demonstrate improved balance by one grade order to decrease risk of falls  6  Pt will increase b/l LE strength by 1 grade in order to increase ease of functional mobility and transfers  Plan   Treatment/Interventions Functional transfer training;LE strengthening/ROM; Elevations; Therapeutic exercise; Endurance training;Patient/family training;Equipment eval/education; Bed mobility;Gait training;Spoke to nursing   PT Frequency Other (Comment)  (3-5x/wk)   Recommendation   PT Discharge Recommendation No rehabilitation needs   Equipment Recommended [address] [facility] [place] Recommended Wheeled walker   Change/add to [facility]?  No   PT - OK to Discharge No  (pending stair trial)   AM-PAC Basic Mobility Inpatient   Turning in Bed Without Bedrails 3   Lying on Back to Sitting on Edge of Flat Bed 3   Moving Bed to Chair 3   Standing Up From Chair 3   Walk in Room 3   Climb 3-5 Stairs 3   Basic Mobility Inpatient Raw Score 18   Basic Mobility Standardized Score 41 05   Modified [place] Scale   Modified Sharri Scale 4         Jackie Ellington, PT, DPT

## 2021-09-15 NOTE — ASSESSMENT & PLAN NOTE
Hypomanic mood  - On home Thorazine 100 mg q h s , Latuda 80 mg, trazodone 200 mg,  Seroquel 300 mg daily   - C/w Thorazine and Latuda  - Restarted trazodone 200 mg yesterday

## 2021-09-15 NOTE — ASSESSMENT & PLAN NOTE
Uncontrolled as outpatient  - HgbA1c 8 5 on 9/8/21  - On home Lantus 40U qHS  - Will d/c today on Lantus 40U and diabetic diet counseling   - f/u with PCP

## 2021-09-15 NOTE — PERIOPERATIVE NURSING NOTE
Elevated blood sugar communicated with Dr Kelsey Yi (anesthsia), orders for insulin, given, see MAR, follow up blood sugar communicated with dr Kelsey Yi x2 and insulin given as ordered, JEN Palomo aware

## 2021-09-15 NOTE — ASSESSMENT & PLAN NOTE
BP has been well controlled during hospital stay  - Last Blood Pressure: 118/82 ( 5781)  - Systolic (57SXG), F , Min:104 , Max:137   - C/w Prazosin 1mg qHS

## 2021-09-15 NOTE — ASSESSMENT & PLAN NOTE
29 y/o F with DM, COPD, BPD, PAD w/ L SFA PTA/stent 8/11/2021, subsequent stent thrombectomy 8/25/2021, who presents with acute Left leg pain in setting of recurrent  L SFA stent occlusion now s/p L CFA to BK-pop bypass with in situ GSV 9/14   Left PT and DP signal       Plan:  Hematology note reviewed - from vascular surgery standpoint ASA and xarelto 2 5mg BID PAD dosing is sufficient for bypass patency, will defer anticoagulation dosing to primary team  Gen diet  Continue neurovascular monitoring  Appreciate APS to assist with pain control

## 2021-09-15 NOTE — PLAN OF CARE
Problem: Nutrition/Hydration-ADULT  Goal: Nutrient/Hydration intake appropriate for improving, restoring or maintaining nutritional needs  Description: Monitor and assess patient's nutrition/hydration status for malnutrition  Collaborate with interdisciplinary team and initiate plan and interventions as ordered  Monitor patient's weight and dietary intake as ordered or per policy  Utilize nutrition screening tool and intervene as necessary  Determine patient's food preferences and provide high-protein, high-caloric foods as appropriate       INTERVENTIONS:  - Monitor oral intake, urinary output, labs, and treatment plans  - Assess nutrition and hydration status and recommend course of action  - Evaluate amount of meals eaten  - Assist patient with eating if necessary   - Allow adequate time for meals  - Recommend/ encourage appropriate diets, oral nutritional supplements, and vitamin/mineral supplements  - Order, calculate, and assess calorie counts as needed  - Recommend, monitor, and adjust tube feedings and TPN/PPN based on assessed needs  - Assess need for intravenous fluids  - Provide specific nutrition/hydration education as appropriate  - Include patient/family/caregiver in decisions related to nutrition  Outcome: Progressing     Problem: Prexisting or High Potential for Compromised Skin Integrity  Goal: Skin integrity is maintained or improved  Description: INTERVENTIONS:  - Identify patients at risk for skin breakdown  - Assess and monitor skin integrity  - Assess and monitor nutrition and hydration status  - Monitor labs   - Assess for incontinence   - Turn and reposition patient  - Assist with mobility/ambulation  - Relieve pressure over bony prominences  - Avoid friction and shearing  - Provide appropriate hygiene as needed including keeping skin clean and dry  - Evaluate need for skin moisturizer/barrier cream  - Collaborate with interdisciplinary team   - Patient/family teaching  - Consider wound care consult   Outcome: Progressing     Problem: Potential for Falls  Goal: Patient will remain free of falls  Description: INTERVENTIONS:  - Educate patient/family on patient safety including physical limitations  - Instruct patient to call for assistance with activity   - Consult OT/PT to assist with strengthening/mobility   - Keep Call bell within reach  - Keep bed low and locked with side rails adjusted as appropriate  - Keep care items and personal belongings within reach  - Initiate and maintain comfort rounds  - Make Fall Risk Sign visible to staff  - Offer Toileting every  Hours, in advance of need  - Initiate/Maintain alarm  - Obtain necessary fall risk management equipment:   - Apply yellow socks and bracelet for high fall risk patients  - Consider moving patient to room near nurses station  Outcome: Progressing     Problem: Knowledge Deficit  Goal: Patient/family/caregiver demonstrates understanding of disease process, treatment plan, medications, and discharge instructions  Description: Complete learning assessment and assess knowledge base    Interventions:  - Provide teaching at level of understanding  - Provide teaching via preferred learning methods  Outcome: Progressing     Problem: MOBILITY - ADULT  Goal: Maintain or return to baseline ADL function  Description: INTERVENTIONS:  -  Assess patient's ability to carry out ADLs; assess patient's baseline for ADL function and identify physical deficits which impact ability to perform ADLs (bathing, care of mouth/teeth, toileting, grooming, dressing, etc )  - Assess/evaluate cause of self-care deficits   - Assess range of motion  - Assess patient's mobility; develop plan if impaired  - Assess patient's need for assistive devices and provide as appropriate  - Encourage maximum independence but intervene and supervise when necessary  - Involve family in performance of ADLs  - Assess for home care needs following discharge   - Consider OT consult to assist with ADL evaluation and planning for discharge  - Provide patient education as appropriate  Outcome: Progressing  Goal: Maintains/Returns to pre admission functional level  Description: INTERVENTIONS:  - Perform BMAT or MOVE assessment daily    - Set and communicate daily mobility goal to care team and patient/family/caregiver  - Collaborate with rehabilitation services on mobility goals if consulted  - Perform Range of Motion  times a day  - Reposition patient every  hours    - Dangle patient  times a day  - Stand patient  times a day  - Ambulate patient  times a day  - Out of bed to chair  times a day   - Out of bed for meals  times a day  - Out of bed for toileting  - Record patient progress and toleration of activity level   Outcome: Progressing

## 2021-09-15 NOTE — PROGRESS NOTES
[address] [facility]  Progress Note - Kim Duvall 1985, 30 y o  female MRN: [id number]  Unit/Bed#: [facility] 347-52 Encounter: [id number]  Primary Care Provider: Maria G Cotto DO   Date and time admitted to hospital: 9/8/2021  6:48 AM    Peripheral artery disease [facility])  Assessment & Plan  31 y/o F with DM, COPD, BPD, PAD w/ L SFA PTA/stent 8/11/2021, subsequent stent thrombectomy 8/25/2021, who presents with acute Left leg pain in setting of recurrent  L SFA stent occlusion now s/p L CFA to BK-pop bypass with in situ GSV 9/14  Left PT and DP signal       Plan:  Stop Plavix and transition to bASA and xarelto 2 5mg BID  Gen diet  Continue neurovascular monitoring  Appreciate APS to assist with pain control      Subjective:  No acute events overnight  Complains of pain of left groin incision  Otherwise motor and sensation of left foot returned  No chest pain or SOB  Vitals:  /56 (BP Location: Right arm)   Pulse 98   Temp 98 2 °F (36 8 °C) (Oral)   Resp 16   Ht 5' 1" (1 549 m)   Wt 97 5 kg (214 lb 15 2 oz)   LMP 08/22/2021   SpO2 98%   BMI 40 61 kg/m²     I/Os:  I/O last 3 completed shifts: In: 1072 [P O :222; I V :850]  Out: 9381 [WWJEC:1305; Blood:200]  No intake/output data recorded  Lab Results and Cultures:   Lab Results   Component Value Date    WBC 7 34 09/14/2021    HGB 9 2 (L) 09/14/2021    HCT 27 (L) 09/14/2021    MCV 80 (L) 09/14/2021     09/14/2021     Lab Results   Component Value Date    GLUCOSE 177 (H) 09/14/2021    CALCIUM 7 6 (L) 09/15/2021    K 3 9 09/15/2021    CO2 26 09/15/2021     09/15/2021    BUN 18 09/15/2021    CREATININE 0 75 09/15/2021     Lab Results   Component Value Date    INR 0 98 09/14/2021    INR 1 21 (H) 09/08/2021    INR 1 01 08/23/2021    PROTIME 12 6 09/14/2021    PROTIME 13 5 (H) 09/08/2021    PROTIME 13 3 08/23/2021        Blood Culture:   Lab Results   Component Value Date    BLOODCX No Growth After 5 Days  08/22/2021    BLOODCX No Growth After 5 Days   08/22/2021   ,   Urinalysis:   Lab Results   Component Value Date    COLORU Yellow 08/24/2021    CLARITYU Clear 08/24/2021    SPECGRAV 1 017 08/24/2021    PHUR 5 5 08/24/2021    PHUR 7 5 08/07/2020    LEUKOCYTESUR Negative 08/24/2021    NITRITE Negative 08/24/2021    GLUCOSEU 500 (1/2%) (A) 08/24/2021    KETONESU Negative 08/24/2021    BILIRUBINUR Negative 08/24/2021    BLOODU Moderate (A) 08/24/2021   ,   Urine Culture: No results found for: URINECX,   Wound Culure: No results found for: WOUNDCULT    Medications:  Current Facility-Administered Medications   Medication Dose Route Frequency    acetaminophen (TYLENOL) tablet 975 mg  975 mg Oral Q6H [address]    albuterol inhalation solution 2 5 mg  2 5 mg Nebulization Q6H PRN    aspirin chewable tablet 81 mg  81 mg Oral Daily    atorvastatin (LIPITOR) tablet 40 mg  40 mg Oral Daily With Dinner    chlorproMAZINE (THORAZINE) tablet 100 mg  100 mg Oral HS    cyclobenzaprine (FLEXERIL) tablet 5 mg  5 mg Oral TID    diphenhydrAMINE (BENADRYL) tablet 25 mg  25 mg Oral Q6H PRN    gabapentin (NEURONTIN) capsule 300 mg  300 mg Oral TID    heparin (porcine) 25,000 units in 0 45% NaCl 250 mL infusion (premix)  3-30 Units/kg/hr (Order-Specific) Intravenous Titrated    heparin (porcine) injection 3,800 Units  3,800 Units Intravenous Q1H PRN    heparin (porcine) injection 7,600 Units  7,600 Units Intravenous Q1H PRN    HYDROmorphone (DILAUDID) injection 1 mg  1 mg Intravenous Q3H PRN    insulin glargine (LANTUS) subcutaneous injection 40 Units 0 4 mL  40 Units Subcutaneous HS    insulin lispro (HumaLOG) 100 units/mL subcutaneous injection 2-12 Units  2-12 Units Subcutaneous TID AC    ketamine 250 mg (STANDARD CONCENTRATION) IV in sodium chloride 0 9% 250 mL  0 1 mg/kg/hr Intravenous Continuous    lactated ringers bolus 500 mL  500 mL Intravenous Once PRN    And    lactated ringers bolus 500 mL  500 mL Intravenous Once PRN    lactated ringers infusion  75 mL/hr Intravenous Continuous    lurasidone (LATUDA) tablet 80 mg  80 mg Oral Daily With Breakfast    nicotine (NICODERM CQ) 21 mg/24 hr TD 24 hr patch 1 patch  1 patch Transdermal Daily    oxyCODONE (ROXICODONE) immediate release tablet 10 mg  10 mg Oral Q4H PRN    oxyCODONE (ROXICODONE) IR tablet 5 mg  5 mg Oral Q4H PRN    polyethylene glycol (MIRALAX) packet 17 g  17 g Oral Daily PRN    prazosin (MINIPRESS) capsule 1 mg  1 mg Oral HS    sodium chloride 0 9 % bolus 500 mL  500 mL Intravenous Once PRN    And    sodium chloride 0 9 % bolus 500 mL  500 mL Intravenous Once PRN    sodium chloride 0 9 % infusion  100 mL/hr Intravenous Continuous       Physical Exam:    General appearance: alert and oriented, in no acute distress  Lungs: clear to auscultation bilaterally  Heart: S1, S2 normal  Abdomen: soft, non-tender; bowel sounds normal; no masses,  no organomegaly  Extremities: +M/S bilaterally, Left lat foot wound dry  Left groin incision with skin glue without hematoma, mild strikethrough of mepilex along leg incision    Pulse exam:  DP: Right: 2+ Left: doppler signal  PT: Right: 2+ Left: doppler signal      Hanny Hurtado MD  9/15/2021

## 2021-09-15 NOTE — CASE MANAGEMENT
Met with patient who was upset that staff would not speak to her friend, [facility], [phone]  Explained policy to designate main contact which is patient spouse, Paulina Chapman [phone]  Added name and phone # of friend to contact list  Patient was on phone with her ICM, Melody Abreu from [facility], [phone]  Patient asked CM to call [employer] and she signed CODIE form  Called and left message requesting [employer] call CM  Spoke to patient to inform her of therapy recommendation for commode and walker  She consents to order these through [employer]  Order was placed

## 2021-09-15 NOTE — PROGRESS NOTES
Progress Notes - Family Medicine Residency, Tyrone Hidden 1985, 30 y o  female  MRN: [id number]    Unit/Bed#: [facility] 452-88 Encounter: [id number]  Primary Care Provider: Serina Gitelman, DO      Admission Date: 9/8/2021 0648  Length of Stay: 6 days  Code Status:  Level 1 - Full Code  Consult:   INPATIENT CONSULT TO IR  IP CONSULT TO HEMATOLOGY  IP CONSULT TO CASE MANAGEMENT  IP CONSULT TO CARDIOLOGY  IP CONSULT TO PICC TEAM  IP CONSULT TO NUTRITION SERVICES  IP CONSULT TO ACUTE PAIN SERVICE      Assessments & Plans:   Plans discussed with [facility] team and finalization is pending attending physician attestation  * Acute leg pain, left  Assessment & Plan  H/o Left leg atherosclerosis s/p left SFA stent on 8/03, complicated by subsequent stent thrombosis and thrombectomy on 8/25  Pt presented to ED with severe left leg pain and numbness concerning for left SFA stent reocclusion most likely in the setting of  Plavix noncompliance    - Pt was on home Xarelto and Plavix  - S/p ududjod-zv-xxhfklwmg bypass 9/14  - UFH gtt resumed 9/15  - C/w Plavix 75mg daily  - Pain mgt: Tylenol scheduled, Oxy 5/10 PRN for mod/sev pain, Dilaudid 1mg q3 PRN for BTP, Gabapentin 300 TID, Cyclobenzaprine q8 PRN  - Vascular surgery and IR following    Hypertension  Assessment & Plan  BP has been well controlled  - Last Blood Pressure: 121/64 (09/15/21 0302)  - C/w Prazosin 1mg qHS  - Vitals per unit routine    Type 2 diabetes mellitus with diabetic polyneuropathy, with long-term current use of insulin ([address] )  Assessment & Plan  Uncontrolled as outpatient  - HgbA1c 8 5 on 9/8/21  - On home Lantus 40U qHS  - Average BG last 72 hours: (P) 147 [id number]  - received 15U Glargine and 24U regular insulin last 24 hr  - Continue on regimen of Lantus 40 units following surgery   Results from last 7 days   Lab Units 09/14/21  1956 09/14/21  1924 09/14/21  1820 09/14/21  0613 09/13/21  2053 09/13/21  1618 09/13/21  1138   POC GLUCOSE mg/dl 212* 223* 207* 163* 171* 162* 83       Peripheral artery disease (HCC)  Assessment & Plan  Please see A&P on "Acute Left Leg Pain"    Bipolar disorder ([place] [address] )  Assessment & Plan  Mood currently stable  - On home Thorazine 100 mg q h s , Latuda 80 mg, trazodone 200 mg,  Seroquel 300 mg daily   - C/w Thorazine and Latuda  - Closely monitor mentation      Patient Active Problem List   Diagnosis    Encounter for gynecological examination without abnormal finding    Possible exposure to STD    Headache    Hypertension    Type 2 diabetes mellitus with diabetic polyneuropathy, with long-term current use of insulin ([place] [address] )    Hypertensive urgency    Paresthesia    COPD (chronic obstructive pulmonary disease) ([facility])    Nicotine dependence    Bipolar disorder ([place] [address] )    Syncope    Cellulitis    Toe infection    Toe pain, left    Deliberate self-cutting    Left foot pain    COVID-19    Diarrhea    Peripheral artery disease ([place] [address] )    Body mass index (BMI)40 0-44 9, adult ([facility])    Bursitis of left knee    Superficial femoral artery occlusion ([facility])    Acute pain of left knee    Acute leg pain, left       Diet: Diet Paul/CHO Controlled; Consistent Carbohydrate Diet Level 3 (6 carb servings/90 grams CHO/meal)    VTE Pharm PPX: Heparin  VTE Mech PPX: sequential compression device    Hospital Course:   30 y o  female admitted 9/8/2021 for Left leg atherosclerosis concerning for left SFA stent reocclusion most likely in the setting of Plavix noncompliance  Today 09/15/21, HD# 6, pt is POD#1 s/p bssplup-qs-azrvjpuhs bypass (9/14) and resumed on UFH gtt and Plavix  Pt states she has regained sensation and motor func to her LLE  Left pedal pulse is still absent and LLE is still slightly colder than right  Otherwise pain is tolerable with current regimen  Objective:    Per handoff, patient without acute events overnight   Per nursing staff, no concern or report     Patient seen and examined at bedside and without questions or concerns  Patient denies chest pain, dizziness, shortness of breath, palpitations or headaches   Patient understands and agrees with plan      Vitals:     Vitals:    09/14/21 2000 09/14/21 2100 09/14/21 2130 09/15/21 0302   BP:   134/77 121/64   BP Location:    Left arm   Pulse: 94   102   Resp: (!) 25   18   Temp: 97 8 °F (36 6 °C)      TempSrc:       SpO2: 97% 96%  98%   Weight:       Height:         Temp:  [96 6 °F (35 9 °C)-98 6 °F (37 °C)] 97 8 °F (36 6 °C)  HR:  [] 102  Resp:  [14-25] 18  BP: (121-160)/() 121/64  Arterial Line BP: (132-144)/() 132/68  Weight (last 2 days)     None          Intake/Output Summary (Last 24 hours) at 9/15/2021 0629  Last data filed at 9/15/2021 0558  Gross per 24 hour   Intake 850 ml   Output 2645 ml   Net -1795 ml     Invasive Devices     Peripherally Inserted Central Catheter Line            PICC Line 18/73/70 Left Basilic 5 days          Peripheral Intravenous Line            Peripheral IV 09/14/21 Right Hand <1 day                Labs:     CBC:  Results from last 7 days   Lab Units 09/14/21  1740 09/14/21  1657 09/14/21  1527 09/14/21  1321 09/14/21  1127 09/14/21  0548 09/11/21  0638 09/09/21  0610   WBC Thousand/uL  --   --   --   --   --  7 34 7 28 8 76   HEMOGLOBIN g/dL  --   --   --   --   --  11 4* 10 5* 10 4*   I STAT HEMOGLOBIN g/dl 9 2* 8 8* 9 5* 9 2* 9 9*  --   --   --    HEMATOCRIT %  --   --   --   --   --  32 8* 30 0* 29 0*   HEMATOCRIT, ISTAT % 27* 26* 28* 27* 29*  --   --   --    PLATELETS Thousands/uL  --   --   --   --   --  281 299 323   NEUTROS ABS Thousands/µL  --   --   --   --   --  3 87  --   --        CMP:  Results from last 7 days   Lab Units 09/14/21  1740 09/14/21  1657 09/14/21  1527 09/14/21  1321 09/14/21  1127 09/14/21  0548 09/11/21  0638 09/10/21  0533 09/09/21  0610   POTASSIUM mmol/L  --   --   --   --   --  4 3 3 6 3 6 3 4*   CHLORIDE mmol/L  --   --   --   --   --  105 105 106 106   CO2 mmol/L  --   --   --   --   --  28 26 11* 29   CO2, I-STAT mmol/L 26 25 26 27 27  --   --   --   --    BUN mg/dL  --   --   --   --   --  27* 22 28* 19   CREATININE mg/dL  --   --   --   --   --  0 76 0 74 0 57* 0 61   GLUCOSE, ISTAT mg/dl 177* 173* 162* 115 103  --   --   --   --    CALCIUM mg/dL  --   --   --   --   --  8 5 8 2* 7 9* 7 9*   EGFR ml/min/1 73sq m  --   --   --   --   --  118 122 140 137       Sepsis:        Micro:  Lab Results   Component Value Date/Time    Blood Culture No Growth After 5 Days  08/22/2021 11:09 PM    Blood Culture No Growth After 5 Days  08/22/2021 11:09 PM    Gram Stain Result No No Polys or Bacteria seen 08/25/2021 10:02 AM    Body Fluid Culture, Sterile (A) 08/25/2021 08:26 AM     3+ Growth of Methicillin Resistant Staphylococcus aureus       Imaging:   No results found      Medications:     Current Facility-Administered Medications   Medication Dose Route Frequency    acetaminophen (TYLENOL) tablet 975 mg  975 mg Oral Q6H [facility]    albuterol inhalation solution 2 5 mg  2 5 mg Nebulization Q6H PRN    aspirin chewable tablet 81 mg  81 mg Oral Daily    atorvastatin (LIPITOR) tablet 40 mg  40 mg Oral Daily With Dinner    chlorproMAZINE (THORAZINE) tablet 100 mg  100 mg Oral HS    cyclobenzaprine (FLEXERIL) tablet 5 mg  5 mg Oral TID    diphenhydrAMINE (BENADRYL) tablet 25 mg  25 mg Oral Q6H PRN    gabapentin (NEURONTIN) capsule 300 mg  300 mg Oral TID    heparin (porcine) 25,000 units in 0 45% NaCl 250 mL infusion (premix)  3-30 Units/kg/hr (Order-Specific) Intravenous Titrated    heparin (porcine) injection 3,800 Units  3,800 Units Intravenous Q1H PRN    heparin (porcine) injection 7,600 Units  7,600 Units Intravenous Q1H PRN    HYDROmorphone (DILAUDID) injection 1 mg  1 mg Intravenous Q3H PRN    insulin glargine (LANTUS) subcutaneous injection 40 Units 0 4 mL  40 Units Subcutaneous HS    insulin lispro (HumaLOG) 100 units/mL subcutaneous injection 2-12 Units  2-12 Units Subcutaneous TID AC    ketamine 250 mg (STANDARD CONCENTRATION) IV in sodium chloride 0 9% 250 mL  0 1 mg/kg/hr Intravenous Continuous    lactated ringers bolus 500 mL  500 mL Intravenous Once PRN    And    lactated ringers bolus 500 mL  500 mL Intravenous Once PRN    lactated ringers infusion  75 mL/hr Intravenous Continuous    lurasidone (LATUDA) tablet 80 mg  80 mg Oral Daily With Breakfast    nicotine (NICODERM CQ) 21 mg/24 hr TD 24 hr patch 1 patch  1 patch Transdermal Daily    oxyCODONE (ROXICODONE) immediate release tablet 10 mg  10 mg Oral Q4H PRN    oxyCODONE (ROXICODONE) IR tablet 5 mg  5 mg Oral Q4H PRN    polyethylene glycol (MIRALAX) packet 17 g  17 g Oral Daily PRN    prazosin (MINIPRESS) capsule 1 mg  1 mg Oral HS    sodium chloride 0 9 % bolus 500 mL  500 mL Intravenous Once PRN    And    sodium chloride 0 9 % bolus 500 mL  500 mL Intravenous Once PRN    sodium chloride 0 9 % infusion  100 mL/hr Intravenous Continuous       Physical Exam:       Physical Exam:    General: +Obese  Pt observed lying comfortably in bed, NAD  Not toxic/ill-appearing  No cachectic or diaphoresis  No obvious sign of trauma or bleeding  Psych: AAOx4, able to converse appropriately  Neuro: no gross neurological deficits, CN 2-12 grossly intact  Head: atraumatic, normocephalic  Eyes: open spontaneously, EOM intact, DAMON, conjunctiva non-injected, no scleral icterus, no discharge  Ear: normal external ear, no visible drainage at external auditory orifice  Nose: clear, no epistaxis, no rhinorrhea  Throat: clear, no hoarse voice, no cough  Neck: supple, normal ROM  Heart: RRR, no murmur/distant heart sound appreciated  Lungs: LCTABL, nml respiratory effort, no agonal/labored breathing, no accessory muscle use  Abdomen: soft, nontender, nondistended, normal bowel sound  Extremities:  Left leg wrapped in compression bandage with minimal bleed through  Improved sensation of LLE, however still < right    Pt is able to wiggle her left toes and foot but have trouble bending left knee due to pain  Absent left pedal pulse  No LE edema  Areli Shaw MD  PGY-2, Family Medicine  09/15/21  6:29 AM    Dear reader, please be aware that portions of my note contain dictated text  I have done my best to proof-read this note prior to signing  However, there may be occasional unnoticed errors pertaining to "sound-alike" words and/or grammar during my dictation process  If there is any words or information that is unclear or appears erroneous, please kindly let me know and I will clarify and/or addend my notes accordingly  Thank you for your understanding

## 2021-09-15 NOTE — ASSESSMENT & PLAN NOTE
H/o Left leg atherosclerosis s/p left SFA stent on 7/07, complicated by subsequent stent thrombosis and thrombectomy on 8/25  Pt presented to ED with severe left leg pain and numbness concerning for left SFA stent reocclusion most likely in the setting of Plavix noncompliance    - Pt was on home Xarelto and Plavix  - S/p gbsgenf-yz-cdkvpcfng bypass 9/14  - Vascular surgery, IR, and APS following  - Per Vascular surgery resident Nolberto Franklin, pt is is okay to d/c on home aspirin and xarelto  - D/c with ASA 81mg daily and Xarelto 2 5mg BID  - D/c pain medications will include: Tylenol 975 mg prn q 6 hrs for mild pain, flexeril 5 mg TID, Dilaudid 2 mg prn q 6 hrs for severe pain (5 days), Neurontin 300 TID  - f/u with outpatient vascular surgery and PCP

## 2021-09-15 NOTE — PROGRESS NOTES
Progress Note - Cardiology   Randell Fry 30 y o  female MRN: [id number]  Unit/Bed#: [facility] 511-01 Encounter: [id number]        Principal Problem:    Acute leg pain, left  Active Problems:    Hypertension    Type 2 diabetes mellitus with diabetic polyneuropathy, with long-term current use of insulin (Formerly [facility])    COPD (chronic obstructive pulmonary disease) (Formerly [facility])    Nicotine dependence    Bipolar disorder ([address] )    Peripheral artery disease (Formerly [facility])      Assessment/Plan    1  PAD S/P Lt below knee to popliteal bypass with Insitu GSV graft  H O Lt SFA stent 8/11 with subsequent lysis and thrombectomy 8/24 for stent occlusion  Presented with recurrent stent occlusion likely in the setting of medication noncompliance  Asa 81mg / AP- eliquis 2 5mg BID  Statin  Preop TTE-LVEF 60% with mild concentric LVH  2  IDDM-hemoglobin A1c 8 5  Per Medicine    3  Abnormal stress test  Done preoperatively-small, moderately severe, reversible defect basal to mid anterior wall  ? artifact  Medical management at this time and importance of compliance  Pre hospital exertional few episodes  CP/SOB  Advise for outpatient followup  Asa, statin, reluctant to add BB d/t cocaine useT  maximiliano- 's    4  COPD- tobacco abuse  Advised complete cessation    5  HTN-normotensive  PTA- lisinopril 40 mg, on hold  Prazosin 1mg HS    6  Polysubstance abuse-history of cocaine and marijuana use  Ongoing tobacco abuse- nicotine patch  Advised complete cessation        Subjective/Objective   Chief Complaint/Subjective  Happy she can move her left foot  No complaints of chest pain or shortness of breath          Vitals: /56 (BP Location: Right arm)   Pulse 98   Temp 98 2 °F (36 8 °C) (Oral)   Resp 16   Ht 5' 1" (1 549 m)   Wt 97 5 kg (214 lb 15 2 oz)   LMP 08/22/2021   SpO2 98%   BMI 40 61 kg/m²     Vitals:    09/12/21 1043   Weight: 97 5 kg (214 lb 15 2 oz)     Orthostatic Blood Pressures      Most Recent Value   Blood Pressure  104/56 filed at 09/15/2021 0744   Patient Position - Orthostatic VS  Lying filed at 09/15/2021 0744            Intake/Output Summary (Last 24 hours) at 9/15/2021 1021  Last data filed at 9/15/2021 0857  Gross per 24 hour   Intake 1030 ml   Output 2645 ml   Net -1615 ml       Invasive Devices     Peripherally Inserted Central Catheter Line            PICC Line 03/81/01 Left Basilic 5 days          Peripheral Intravenous Line            Peripheral IV 09/14/21 Right Hand <1 day                Current Facility-Administered Medications   Medication Dose Route Frequency    acetaminophen (TYLENOL) tablet 975 mg  975 mg Oral Q6H [address]    albuterol inhalation solution 2 5 mg  2 5 mg Nebulization Q6H PRN    aspirin chewable tablet 81 mg  81 mg Oral Daily    atorvastatin (LIPITOR) tablet 40 mg  40 mg Oral Daily With Dinner    chlorproMAZINE (THORAZINE) tablet 100 mg  100 mg Oral HS    cyclobenzaprine (FLEXERIL) tablet 5 mg  5 mg Oral TID    diphenhydrAMINE (BENADRYL) tablet 25 mg  25 mg Oral Q6H PRN    gabapentin (NEURONTIN) capsule 300 mg  300 mg Oral TID    HYDROmorphone (DILAUDID) injection 1 mg  1 mg Intravenous Q3H PRN    insulin glargine (LANTUS) subcutaneous injection 40 Units 0 4 mL  40 Units Subcutaneous HS    insulin lispro (HumaLOG) 100 units/mL subcutaneous injection 2-12 Units  2-12 Units Subcutaneous TID AC    ketamine 250 mg (STANDARD CONCENTRATION) IV in sodium chloride 0 9% 250 mL  0 1 mg/kg/hr Intravenous Continuous    lurasidone (LATUDA) tablet 80 mg  80 mg Oral Daily With Breakfast    nicotine (NICODERM CQ) 21 mg/24 hr TD 24 hr patch 1 patch  1 patch Transdermal Daily    oxyCODONE (ROXICODONE) immediate release tablet 10 mg  10 mg Oral Q4H PRN    oxyCODONE (ROXICODONE) IR tablet 5 mg  5 mg Oral Q4H PRN    polyethylene glycol (MIRALAX) packet 17 g  17 g Oral Daily PRN    prazosin (MINIPRESS) capsule 1 mg  1 mg Oral HS    rivaroxaban (XARELTO) tablet 2 5 mg  2 5 mg Oral BID With Meals Physical Exam: /56 (BP Location: Right arm)   Pulse 98   Temp 98 2 °F (36 8 °C) (Oral)   Resp 16   Ht 5' 1" (1 549 m)   Wt 97 5 kg (214 lb 15 2 oz)   LMP 08/22/2021   SpO2 98%   BMI 40 61 kg/m²     General Appearance:    Alert, cooperative, no distress, appears stated age   Head:    Normocephalic, no scleral icterus   Eyes:    PERRL   Nose:   Nares normal, septum midline, no drainage    Throat:   Lips, mucosa, and tongue normal   Neck:   Supple, symmetrical, trachea midline,                  Chest Wall:    No tenderness or deformity    Heart:    Regular rate and rhythm, S1 and S2 normal, no murmur   Abdomen:     Soft, non-tender, bowel sounds active all four quadrants,     no masses, no organomegaly   Extremities:   Extremities normal, atraumatic, no cyanosis or edema       Skin:   Skin warm   Neurologic:   Alert and oriented to person place and time, no focal deficits                 Lab Results:   Recent Results (from the past 72 hour(s))   Fingerstick Glucose (POCT)    Collection Time: 09/12/21 11:12 AM   Result Value Ref Range    POC Glucose 106 65 - 140 mg/dl   APTT    Collection Time: 09/12/21 12:56 PM   Result Value Ref Range    PTT 86 (H) 23 - 37 seconds   Fingerstick Glucose (POCT)    Collection Time: 09/12/21  3:58 PM   Result Value Ref Range    POC Glucose 105 65 - 140 mg/dl   APTT    Collection Time: 09/12/21  7:01 PM   Result Value Ref Range    PTT 78 (H) 23 - 37 seconds   Fingerstick Glucose (POCT)    Collection Time: 09/12/21  8:58 PM   Result Value Ref Range    POC Glucose 118 65 - 140 mg/dl   APTT    Collection Time: 09/13/21  6:01 AM   Result Value Ref Range    PTT 76 (H) 23 - 37 seconds   Type and screen    Collection Time: 09/13/21  6:01 AM   Result Value Ref Range    ABO Grouping B     Rh Factor Positive     Antibody Screen Negative     Specimen Expiration Date [id number]    Fingerstick Glucose (POCT)    Collection Time: 09/13/21  6:33 AM   Result Value Ref Range    POC Glucose 76 65 - 140 mg/dl   Fingerstick Glucose (POCT)    Collection Time: 09/13/21 11:38 AM   Result Value Ref Range    POC Glucose 83 65 - 140 mg/dl   Fingerstick Glucose (POCT)    Collection Time: 09/13/21  4:18 PM   Result Value Ref Range    POC Glucose 162 (H) 65 - 140 mg/dl   Fingerstick Glucose (POCT)    Collection Time: 09/13/21  8:53 PM   Result Value Ref Range    POC Glucose 171 (H) 65 - 140 mg/dl   hCG, quantitative, pregnancy    Collection Time: 09/14/21  5:38 AM   Result Value Ref Range    HCG, Quant <2 <=6 mIU/mL   APTT    Collection Time: 09/14/21  5:48 AM   Result Value Ref Range    PTT 76 (H) 23 - 37 seconds   CBC and differential    Collection Time: 09/14/21  5:48 AM   Result Value Ref Range    WBC 7 34 4 31 - 10 16 Thousand/uL    RBC 4 11 3 81 - 5 12 Million/uL    Hemoglobin 11 4 (L) 11 5 - 15 4 g/dL    Hematocrit 32 8 (L) 34 8 - 46 1 %    MCV 80 (L) 82 - 98 fL    MCH 27 7 26 8 - 34 3 pg    MCHC 34 8 31 4 - 37 4 g/dL    RDW 13 7 11 6 - 15 1 %    MPV 10 5 8 9 - 12 7 fL    Platelets 447 217 - 774 Thousands/uL    nRBC 0 /100 WBCs    Neutrophils Relative 52 43 - 75 %    Immat GRANS % 2 0 - 2 %    Lymphocytes Relative 33 14 - 44 %    Monocytes Relative 8 4 - 12 %    Eosinophils Relative 5 0 - 6 %    Basophils Relative 0 0 - 1 %    Neutrophils Absolute 3 87 1 85 - 7 62 Thousands/µL    Immature Grans Absolute 0 11 0 00 - 0 20 Thousand/uL    Lymphocytes Absolute 2 42 0 60 - 4 47 Thousands/µL    Monocytes Absolute 0 58 0 17 - 1 22 Thousand/µL    Eosinophils Absolute 0 33 0 00 - 0 61 Thousand/µL    Basophils Absolute 0 03 0 00 - 0 10 Thousands/µL   Basic metabolic panel    Collection Time: 09/14/21  5:48 AM   Result Value Ref Range    Sodium 134 (L) 136 - 145 mmol/L    Potassium 4 3 3 5 - 5 3 mmol/L    Chloride 105 100 - 108 mmol/L    CO2 28 21 - 32 mmol/L    ANION GAP 1 (L) 4 - 13 mmol/L    BUN 27 (H) 5 - 25 mg/dL    Creatinine 0 76 0 60 - 1 30 mg/dL    Glucose 156 (H) 65 - 140 mg/dL    Calcium 8 5 8 3 - 10 1 mg/dL    eGFR 118 ml/min/1 73sq m   Protime-INR    Collection Time: 09/14/21  5:48 AM   Result Value Ref Range    Protime 12 6 11 6 - 14 5 seconds    INR 0 98 0 84 - 1 19   Fingerstick Glucose (POCT)    Collection Time: 09/14/21  6:13 AM   Result Value Ref Range    POC Glucose 163 (H) 65 - 140 mg/dl   POCT Blood Gas (CG8+)    Collection Time: 09/14/21 11:27 AM   Result Value Ref Range    pH, Art i-STAT 7 404 7 350 - 7 450    pCO2, Art i-STAT 41 4 36 0 - 44 0 mm HG    pO2, ART i-STAT 167 0 (H) 75 0 - 129 0 mm HG    BE, i-STAT 1 -2 - 3 mmol/L    HCO3, Art i-STAT 25 9 22 0 - 28 0 mmol/L    CO2, i-STAT 27 21 - 32 mmol/L    O2 Sat, i-STAT 100 (H) 60 - 85 %    SODIUM, I-STAT 136 136 - 145 mmol/l    Potassium, i-STAT 4 3 3 5 - 5 3 mmol/L    Calcium, Ionized i-STAT 1 14 1 12 - 1 32 mmol/L    Hct, i-STAT 29 (L) 34 8 - 46 1 %    Hgb, i-STAT 9 9 (L) 11 5 - 15 4 g/dl    Glucose, i-STAT 103 65 - 140 mg/dl    Specimen Type ARTERIAL    POCT Blood Gas (CG8+)    Collection Time: 09/14/21  1:21 PM   Result Value Ref Range    pH, Art i-STAT 7 394 7 350 - 7 450    pCO2, Art i-STAT 41 6 36 0 - 44 0 mm HG    pO2, ART i-STAT 181 0 (H) 75 0 - 129 0 mm HG    BE, i-STAT 0 -2 - 3 mmol/L    HCO3, Art i-STAT 25 4 22 0 - 28 0 mmol/L    CO2, i-STAT 27 21 - 32 mmol/L    O2 Sat, i-STAT 100 (H) 60 - 85 %    SODIUM, I-STAT 136 136 - 145 mmol/l    Potassium, i-STAT 4 4 3 5 - 5 3 mmol/L    Calcium, Ionized i-STAT 1 04 (L) 1 12 - 1 32 mmol/L    Hct, i-STAT 27 (L) 34 8 - 46 1 %    Hgb, i-STAT 9 2 (L) 11 5 - 15 4 g/dl    Glucose, i-STAT 115 65 - 140 mg/dl    Specimen Type ARTERIAL    POCT activated clotting time    Collection Time: 09/14/21  2:07 PM   Result Value Ref Range    Activated Clotting Time, i-STAT 286 (H) 89 - 137 sec    Specimen Type ARTERIAL    POCT activated clotting time    Collection Time: 09/14/21  2:42 PM   Result Value Ref Range    Activated Clotting Time, i-STAT 243 (H) 89 - 137 sec    Specimen Type ARTERIAL    POCT activated clotting time    Collection Time: 09/14/21  3:16 PM   Result Value Ref Range    Activated Clotting Time, i-STAT 237 (H) 89 - 137 sec    Specimen Type ARTERIAL    POCT Blood Gas (CG8+)    Collection Time: 09/14/21  3:27 PM   Result Value Ref Range    pH, Art i-STAT 7 411 7 350 - 7 450    pCO2, Art i-STAT 39 3 36 0 - 44 0 mm HG    pO2, ART i-STAT 136 0 (H) 75 0 - 129 0 mm HG    BE, i-STAT 0 -2 - 3 mmol/L    HCO3, Art i-STAT 25 0 22 0 - 28 0 mmol/L    CO2, i-STAT 26 21 - 32 mmol/L    O2 Sat, i-STAT 99 (H) 60 - 85 %    SODIUM, I-STAT 135 (L) 136 - 145 mmol/l    Potassium, i-STAT 4 6 3 5 - 5 3 mmol/L    Calcium, Ionized i-STAT 1 05 (L) 1 12 - 1 32 mmol/L    Hct, i-STAT 28 (L) 34 8 - 46 1 %    Hgb, i-STAT 9 5 (L) 11 5 - 15 4 g/dl    Glucose, i-STAT 162 (H) 65 - 140 mg/dl    Specimen Type ARTERIAL    POCT activated clotting time    Collection Time: 09/14/21  3:50 PM   Result Value Ref Range    Activated Clotting Time, i-STAT 249 (H) 89 - 137 sec    Specimen Type ARTERIAL    POCT activated clotting time    Collection Time: 09/14/21  4:26 PM   Result Value Ref Range    Activated Clotting Time, i-STAT 225 (H) 89 - 137 sec    Specimen Type ARTERIAL    POCT Blood Gas (CG8+)    Collection Time: 09/14/21  4:57 PM   Result Value Ref Range    pH, Art i-STAT 7 394 7 350 - 7 450    pCO2, Art i-STAT 39 1 36 0 - 44 0 mm HG    pO2, ART i-STAT 167 0 (H) 75 0 - 129 0 mm HG    BE, i-STAT -1 -2 - 3 mmol/L    HCO3, Art i-STAT 23 9 22 0 - 28 0 mmol/L    CO2, i-STAT 25 21 - 32 mmol/L    O2 Sat, i-STAT 99 (H) 60 - 85 %    SODIUM, I-STAT 135 (L) 136 - 145 mmol/l    Potassium, i-STAT 4 7 3 5 - 5 3 mmol/L    Calcium, Ionized i-STAT 1 13 1 12 - 1 32 mmol/L    Hct, i-STAT 26 (L) 34 8 - 46 1 %    Hgb, i-STAT 8 8 (L) 11 5 - 15 4 g/dl    Glucose, i-STAT 173 (H) 65 - 140 mg/dl    Specimen Type ARTERIAL    POCT Blood Gas (CG8+)    Collection Time: 09/14/21  5:40 PM   Result Value Ref Range    pH, Art i-STAT 7 394 7 350 - 7 450    pCO2, Art i-STAT 41 2 36 0 - 44 0 mm HG    pO2, ART i-STAT 174 0 (H) 75 0 - 129 0 mm HG    BE, i-STAT 0 -2 - 3 mmol/L    HCO3, Art i-STAT 25 2 22 0 - 28 0 mmol/L    CO2, i-STAT 26 21 - 32 mmol/L    O2 Sat, i-STAT 100 (H) 60 - 85 %    SODIUM, I-STAT 134 (L) 136 - 145 mmol/l    Potassium, i-STAT 5 0 3 5 - 5 3 mmol/L    Calcium, Ionized i-STAT 1 06 (L) 1 12 - 1 32 mmol/L    Hct, i-STAT 27 (L) 34 8 - 46 1 %    Hgb, i-STAT 9 2 (L) 11 5 - 15 4 g/dl    Glucose, i-STAT 177 (H) 65 - 140 mg/dl    Specimen Type ARTERIAL    Fingerstick Glucose (POCT)    Collection Time: 09/14/21  6:20 PM   Result Value Ref Range    POC Glucose 207 (H) 65 - 140 mg/dl   Fingerstick Glucose (POCT)    Collection Time: 09/14/21  7:24 PM   Result Value Ref Range    POC Glucose 223 (H) 65 - 140 mg/dl   Fingerstick Glucose (POCT)    Collection Time: 09/14/21  7:56 PM   Result Value Ref Range    POC Glucose 212 (H) 65 - 140 mg/dl   APTT    Collection Time: 09/15/21 12:39 AM   Result Value Ref Range    PTT 30 23 - 37 seconds   Fingerstick Glucose (POCT)    Collection Time: 09/15/21  6:07 AM   Result Value Ref Range    POC Glucose 238 (H) 65 - 140 mg/dl   CBC    Collection Time: 09/15/21  7:28 AM   Result Value Ref Range    WBC 6 77 4 31 - 10 16 Thousand/uL    RBC 2 82 (L) 3 81 - 5 12 Million/uL    Hemoglobin 7 9 (L) 11 5 - 15 4 g/dL    Hematocrit 22 5 (L) 34 8 - 46 1 %    MCV 80 (L) 82 - 98 fL    MCH 28 0 26 8 - 34 3 pg    MCHC 35 1 31 4 - 37 4 g/dL    RDW 13 5 11 6 - 15 1 %    Platelets 168 903 - 883 Thousands/uL    MPV 10 6 8 9 - 12 7 fL   Basic metabolic panel    Collection Time: 09/15/21  7:28 AM   Result Value Ref Range    Sodium 137 136 - 145 mmol/L    Potassium 3 9 3 5 - 5 3 mmol/L    Chloride 106 100 - 108 mmol/L    CO2 26 21 - 32 mmol/L    ANION GAP 5 4 - 13 mmol/L    BUN 18 5 - 25 mg/dL    Creatinine 0 75 0 60 - 1 30 mg/dL    Glucose 217 (H) 65 - 140 mg/dL    Calcium 7 6 (L) 8 3 - 10 1 mg/dL    eGFR 120 ml/min/1 73sq m   APTT    Collection Time: 09/15/21  7:28 AM Result Value Ref Range     (HH) 23 - 37 seconds   Prepare Leukoreduced RBC: 2 Units    Collection Time: 09/15/21  9:17 AM   Result Value Ref Range    Unit Product Code H3341A59     Unit Number [id number]     Unit ABO B     Unit RH POS     Crossmatch Compatible     Unit Dispense Status Return to [facility]     Unit Product Volume 350 mL    Unit Product Code C9342I79     Unit Number [id number]     Unit ABO B     Unit RH POS     Crossmatch Compatible     Unit Dispense Status Return to Inv     Unit Product Volume 350 mL   Prepare Leukoreduced RBC: 2 Units    Collection Time: 09/15/21  9:18 AM   Result Value Ref Range    Unit Product Code G7552X94     Unit Number [id number]     Unit ABO B     Unit RH POS     Crossmatch Compatible     Unit Dispense Status Return to [facility]     Unit Product Volume 350 ml    Unit Product Code B3021L59     Unit Number [id number]     Unit ABO B     Unit RH POS     Crossmatch Compatible     Unit Dispense Status Return to Inv     Unit Product Volume 350 mL     Imaging: I have personally reviewed pertinent reports  Tele- nsr    Counseling / Coordination of Care  Total time spent today 25 minutes  Greater than 50% of total time was spent with the patient and / or family counseling and / or coordination of care

## 2021-09-15 NOTE — PLAN OF CARE
Problem: PHYSICAL THERAPY ADULT  Goal: Performs mobility at highest level of function for planned discharge setting  See evaluation for individualized goals  Description: Treatment/Interventions: Functional transfer training, LE strengthening/ROM, Elevations, Therapeutic exercise, Endurance training, Patient/family training, Equipment eval/education, Bed mobility, Gait training, Spoke to nursing  Equipment Recommended: Jena Narvaez       See flowsheet documentation for full assessment, interventions and recommendations  Note: Prognosis: Good  Problem List: Decreased strength, Decreased endurance, Impaired balance, Decreased mobility, Decreased safety awareness, Pain  Assessment: Pt seen for high complexity PT evaluation due to decrease in functional mobility status compared to baseline  Pt with active PT eval/treat and up in chair orders at this time  Pt is a 30 y o  F who presented to [facility] with acute leg pain on 9/8/21  Pt is s/p L CFA to BK-pop bypass on 9/14/21  Pt  has a past medical history of Asthma, Bipolar 1 disorder ([facility] [address] ), COPD (chronic obstructive pulmonary disease) ([facility] [address] ), Depression, Diabetes mellitus ([facility] [address] ), Hypertension, Psychiatric disorder, PTSD (post-traumatic stress disorder), and Tendonitis  Pt resides alone in 5th floor hotel room with elevator+full flight steps to enter  Pt presents with decreased strength, balance, endurance that contribute to limitations in bed mobility, functional transfers, functional mobility  Pt requires supervision for all mobility at this time  Pt left supine in bed with bed alarm intact and with all needs in reach  Pt will benefit from skilled therapy in order to address current impairments and functional limitations  PT to follow pt and recommending home pending progress    The patient's AM-PAC Basic Mobility Inpatient Short Form Raw Score is 18, Standardized Score is 41 05  A standardized score less than 42 9 suggests the patient may benefit from discharge to post-acute rehabilitation services  Please also refer to the recommendation of the Physical Therapist for safe discharge planning  Anticipate pt to progress to DC to home  Barriers to Discharge: Inaccessible home environment        PT Discharge Recommendation: No rehabilitation needs     PT - OK to Discharge: No (pending stair trial)    See flowsheet documentation for full assessment

## 2021-09-15 NOTE — OCCUPATIONAL THERAPY NOTE
Occupational Therapy Evaluation     Patient Name: Deepti Rodriguez  KMNAJ'L Date: 9/15/2021  Problem List  Principal Problem:    Acute leg pain, left  Active Problems:    Hypertension    Type 2 diabetes mellitus with diabetic polyneuropathy, with long-term current use of insulin ([facility])    COPD (chronic obstructive pulmonary disease) ([facility])    Nicotine dependence    Bipolar disorder ([place] [address] )    Peripheral artery disease ([facility])    Past Medical History  Past Medical History:   Diagnosis Date    Asthma     Bipolar 1 disorder ([place] [address] )     COPD (chronic obstructive pulmonary disease) ([place] [address] )     Depression     Diabetes mellitus ([place] [address] )     Hypertension     Psychiatric disorder     cutting history    PTSD (post-traumatic stress disorder)     Tendonitis      Past Surgical History  Past Surgical History:   Procedure Laterality Date    BYPASS FEMORAL-POPLITEAL Left 2021    Procedure: Left Common Femoral Below Knee to Popliteal Bypass with Insitu GSV graft  Left Lower Extremity Angiogram;  Surgeon: Lis Garza MD;  [facility];  Service: Vascular     SECTION      EAR SURGERY      IR LOWER EXTREMITY ANGIOGRAM  2021    IR LOWER EXTREMITY ANGIOGRAM  2021         09/15/21 1335   OT Last Visit   OT Visit Date 09/15/21   Note Type   Note type Evaluation   Restrictions/Precautions   Weight Bearing Precautions Per Order No   Other Precautions Cognitive;Multiple lines;Telemetry; Fall Risk;Pain   Pain Assessment   Pain Assessment Tool 0-10   Pain Score Worst Possible Pain   Pain Location/Orientation Orientation: Left; Location: Leg   Home Living   Type of Home Other (Comment)  (hotel- 5th floor room)   Home Layout One level;Elevator  (elevator to 5th floor + FF to room)   Bathroom Shower/Tub Walk-in shower  (in shared bathroom)   Farhat Thacker Other (Comment)  (Pt denies)   Ann [employer] Harry Other (Comment)  (Pt denies)   Additional Comments Pt reports living on the 5th floor of a hotel with limited elevator access- FF of stairs up to room   Prior Function   Level of [place] Needs assistance with ADLs and functional mobility; Needs assistance with IADLs   Lives With Alone   Receives Help From Family;Friend(s)   ADL Assistance Needs assistance   IADLs Needs assistance   Falls in the last 6 months 0   Vocational On disability   Comments Pt reports her wife visits every night to assist her   Lifestyle   Autonomy needs assistance with ADLs, IADLs, and functional mobility PTA   Reciprocal Relationships supportive wife and brother   Service to Others on disability   Intrinsic Gratification mostly sedentary PTA   Psychosocial   Psychosocial (WDL) WDL   Subjective   Subjective "My leg hurts"   ADL   Where Assessed Edge of bed   Eating Assistance 5  Supervision/Setup   Grooming Assistance 5  Supervision/Setup   UB Bathing Assistance 5  Supervision/Setup   LB Bathing Assistance 5  Supervision/Setup   UB Dressing Assistance 5  Supervision/Setup   LB Dressing Assistance 5  [address]  5  Supervision/Setup   Bed Mobility   Supine to Sit 5  Supervision   Additional items HOB elevated; Bedrails; Increased time required;Verbal cues   Sit to Supine 5  Supervision   Additional items HOB elevated; Bedrails; Increased time required;Verbal cues   Additional Comments Pt lying supine in bed upon OT arrival, Pt left in bed per Pt request with all needs met and call bell in reach   Transfers   Sit to Stand 5  Supervision   Additional items Increased time required;Verbal cues   Stand to Sit 5  Supervision   Additional items Impulsive;Verbal cues; Increased time required   Additional Comments Pt utilized RW for functional transfers   Functional Mobility   Functional Mobility 5  Supervision   Additional Comments Pt utilized RW for functional mobility   Additional items Rolling walker   Balance   Static Sitting Fair   Dynamic Sitting Fair Static Standing Fair -   Dynamic Standing Fair -   Ambulatory Poor +   Activity Tolerance   Activity Tolerance Patient limited by pain; Patient limited by fatigue   Medical Staff Made Aware OT Chise   Nurse Made Aware nursing cleared session   RUE Assessment   RUE Assessment WFL   LUE Assessment   LUE Assessment WFL   Cognition   Overall Cognitive Status Impaired   Arousal/Participation Alert; Responsive; Cooperative   Attention Attends with cues to redirect   Orientation Level Oriented X4   Memory Decreased recall of precautions   Following Commands Follows one step commands with increased time or repetition   Comments Pt cooperative with therapy after encourgement- Pt very impulsive and demonstrated decreased recall of precautions   Assessment   Limitation Decreased ADL status; Decreased Safe judgement during ADL;Decreased cognition;Decreased high-level ADLs; Decreased self-care trans   Prognosis Good   Assessment Pt is a 30 y o  female admitted to [facility] on 9/8/2021 with Acute leg pain, left  Pt seen s/p  "H  O Lt SFA stent 8/11 with subsequent lysis and thrombectomy 8/24 for stent occlusion" and "Left Common Femoral Below Knee to Popliteal Bypass with Insitu GSV graft,  Left Lower Extremity Angiogram (Left)" on 9/14/2021  Pt  has a past medical history of Asthma, Bipolar 1 disorder ([facility] [address] ), COPD (chronic obstructive pulmonary disease) ([facility] [address] ), Depression, Diabetes mellitus ([facility] [address] ), Hypertension, Psychiatric disorder, PTSD (post-traumatic stress disorder), and Tendonitis  PTA, Pt reports needing assistance in ADLs, IADLs, and functional mobility 2* pain  Pt lives in a hotel - on the 5th floor - with elevator access + FF of stairs to room  Pt is currently Supervision with ADLs and functional mobility  Pt is demonstrating deficits including decreased ADL status, decreased endurance and decreased high-level ADLs  Pt has supportive wife and brother that can assist prn   No further OT needs indicated at this time- Home with social support- Commode recommended upon d/c  Pt ok to d/c when medically cleared  D/c from caseload  Goals   Patient Goals to feel better   Plan   OT Frequency Eval only   Recommendation   OT Discharge Recommendation No rehabilitation needs  (home with increased social support)   Equipment Recommended Bedside commode   OT - OK to Discharge Yes  (when medically cleared)   AM-PAC Daily Activity Inpatient   Lower Body Dressing 3   Bathing 3   Toileting 4   Upper Body Dressing 4   Grooming 4   Eating 4   Daily Activity Raw Score 22   Daily Activity Standardized Score (Calc for Raw Score >=11) 47  1   AM-PAC Applied Cognition Inpatient   Following a Speech/Presentation 3   Understanding Ordinary Conversation 4   Taking Medications 3   Remembering Where Things Are Placed or Put Away 4   Remembering List of 4-5 Errands 4   Taking Care of Complicated Tasks 3   Applied Cognition Raw Score 21   Applied Cognition Standardized Score 44 3       The patient's raw score on the AM-PAC Daily Activity inpatient short form is 22, standardized score is 47 1, greater than 39 4  Patients at this level are likely to benefit from discharge to home  Please refer to the recommendation of the Occupational Therapist for safe discharge planning        Shakeel Monteiro, ALPA

## 2021-09-15 NOTE — CONSULTS
Consult Note- Acute Pain Service   Rebecca Santana 30 y o  female MRN: [id number]  Unit/Bed#: [facility] 1-1 Encounter: [id number]               Assessment/Plan     Assessment:   Patient Active Problem List   Diagnosis    Encounter for gynecological examination without abnormal finding    Possible exposure to STD    Headache    Hypertension    Type 2 diabetes mellitus with diabetic polyneuropathy, with long-term current use of insulin ([place] [address] )    Hypertensive urgency    Paresthesia    COPD (chronic obstructive pulmonary disease) ([place] [address] )    Nicotine dependence    Bipolar disorder ([place] [address] )    Syncope    Cellulitis    Toe infection    Toe pain, left    Deliberate self-cutting    Left foot pain    COVID-19    Diarrhea    Peripheral artery disease ([place] [address] )    Body mass index (BMI)40 0-44 9, adult ([place] [address] )    Bursitis of left knee    Superficial femoral artery occlusion (HCC)    Acute pain of left knee    Acute leg pain, left      Rebecca Santana is a 30 y o  female  With a istory of peripheral arterial disease status post LEFT SFA stent with subsequent lysis and thrombectomy on 08/24, now status below-knee to popliteal bypass 9/14 with ongoing left lower extremity pain  Plan:   -Continue Tylenol 975mg PO q 6 hours scheduled  -continue Flexeril 5 mg PO t i d   -continue gabapentin 300 mg p o  T i d   -increase ketamine infusion 0 2 milligrams/kilogram per hour  -discontinue oxycodone  -initiate ORAL Dilaudid 2-4 mg p o  Q 4 hours p r n  Moderate to severe pain  -would continue hydromorphone 1 mg q 3 hours p r n  For breakthrough pain today with plan to deescalate over the next few days  -patient's only current bowel regimen medication is MiraLax p r n  Daily, would consider augmentation with senna 7 6 mg to 17 2 mg q h s  Depending on how patient's bowels respond to her opioid regimen  APS will continue to follow   Please contact Acute Pain Service - [facility] via [employer] from [phone] with additional questions or concerns  See Patrick Finnegan for additional contacts and after hours information  History of Present Illness    Admit Date:  9/8/2021  Hospital Day:  6 days  Primary Service:  Vascular Surgery  Attending Provider:  Rosas Bunch MD  Reason for Consult / Principal Problem: LLE PAD s/p bypass graft  HPI: Mimi Mauro is a 30 y o  female who presents with history of peripheral arterial disease, diabetes, smoking history, GERD, obesity, presented to the emergency department on 09/08 numbness of her left foot and pain in her left leg below the knee, history of prior SFA PTA/stent with subsequent thrombectomy, now status post left common femoral below-knee popliteal bypass with in-situ GSV graft on 09/14  Patient was placed on ketamine infusion postoperatively  Complaining of significant pain related to the surgery  Foot feels feels much better than it has in the past month, making me think her pain is currently primarily 2/2 the operation as opposed to being due to vascular insufficiency  No side effects from the ketamine  Requesting we change from Oxycodone as she has very negative feelings about this medication based on experiences of some of her close friends  Current pain location(s): Left leg  Pain Scale:   10/10  Quality: sharp  Current Analgesic regimen:      TYLENOL 975 MG P O  Q 6  FLEXERIL 5 MG T I D  GABAPENTIN 300 MG T I D  KETAMINE 0 1 MILLIGRAMS/KILOGRAM PER HOUR  DILAUDID 1 MG Q 3 HOURS IV P R N  BREAKTHROUGH PAIN  OXY 5-10 MG P O  Q 4 HOURS P R N  Pain History:  Patient has multiple prescriptions of various lengths for various opiate medications over the past year for pain associated with her peripheral arterial disease    Pain Management Provider:  No chronic pain provider    I have reviewed the patient's controlled substance dispensing history in the Prescription Drug Monitoring Program in compliance with the [facility] regulations before prescribing any controlled substances  Inpatient consult to Acute Pain Service     Performed by  Arin Coyle MD     Authorized by Pelon Clarke MD              Review of Systems   Constitutional: Negative for fever  HENT: Negative for ear pain  Eyes: Negative for pain  Respiratory: Negative for chest tightness  Cardiovascular: Negative for chest pain  Gastrointestinal: Negative for abdominal pain  Genitourinary: Negative for dysuria  Musculoskeletal:        L leg pain   Skin: Negative for color change  Neurological: Negative for dizziness and numbness  Psychiatric/Behavioral: Negative for agitation         Historical Information   Past Medical History:   Diagnosis Date    Asthma     Bipolar 1 disorder ([place] [address] )     COPD (chronic obstructive pulmonary disease) ([place] [address] )     Depression     Diabetes mellitus (HCC)     Hypertension     Psychiatric disorder     cutting history    PTSD (post-traumatic stress disorder)     Tendonitis      Past Surgical History:   Procedure Laterality Date     SECTION      EAR SURGERY      IR LOWER EXTREMITY ANGIOGRAM  2021    IR LOWER EXTREMITY ANGIOGRAM  2021     Social History   Social History     Substance and Sexual Activity   Alcohol Use Not Currently    Alcohol/week: 1 0 standard drinks    Types: 1 Shots of liquor per week    Comment: 'I would drink whatever "     Social History     Substance and Sexual Activity   Drug Use Not Currently    Types: Cocaine, Marijuana    Comment: 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     Social History     Tobacco Use   Smoking Status Current Every Day Smoker    Packs/day: 2 00    Years: 17 00    Pack years: 34 00    Types: Cigarettes   Smokeless Tobacco Never Used   Tobacco Comment    pt is down to 4 cigarettes a day      Family History: non-contributory    Meds/Allergies   all current active meds have been reviewed    No Known Allergies    Objective   Temp:  [97 5 °F (36 4 °C)-98 6 °F (37 °C)] 98 2 °F (36 8 °C)  HR: [] 98  Resp:  [14-25] 16  BP: (104-160)/() 104/56  Arterial Line BP: (132-144)/() 132/68    Intake/Output Summary (Last 24 hours) at 9/15/2021 1046  Last data filed at 9/15/2021 0857  Gross per 24 hour   Intake 1030 ml   Output 2645 ml   Net -1615 ml       Physical Exam  Vitals and nursing note reviewed  Constitutional:       General: She is in acute distress  HENT:      Head: Normocephalic  Cardiovascular:      Rate and Rhythm: Normal rate and regular rhythm  Pulses: Normal pulses  Pulmonary:      Effort: Pulmonary effort is normal    Abdominal:      General: Abdomen is flat  Musculoskeletal:      Cervical back: Normal range of motion  Comments: LLE with ace wrap and dressing from groin to below the knee, distal sensation and circulation intact   Neurological:      General: No focal deficit present  Mental Status: She is alert  Psychiatric:         Mood and Affect: Mood normal          Lab Results: I have personally reviewed pertinent labs  Imaging Studies: I have personally reviewed pertinent reports  EKG, Pathology, and Other Studies: I have personally reviewed pertinent reports  Counseling / Coordination of Care  Total floor / unit time spent today Level 3 = 55 minutes  Greater than 50% of total time was spent with the patient and / or family counseling and / or coordination of care  A description of the counseling / coordination of care: chart review, symptom assessment, coordination with primary team, consideration of multimodal pain regimen and titration of medications being given by constant infusion  Please note that the APS provides consultative services regarding pain management only  With the exception of ketamine and epidural infusions and except when indicated, final decisions regarding starting or changing doses of analgesic medications are at the discretion of the consulting service    Off hours consultation and/or medication management is generally not available      Demetria Ravi MD  Acute Pain Service

## 2021-09-15 NOTE — OP NOTE
OPERATIVE REPORT  PATIENT NAME: Josh Malik    :  1985  MRN: [id number]  Pt Location: BE OR ROOM 07    SURGERY DATE: 2021    Surgeon(s) and Role:     * Isaura Patten MD - Primary     * Feng Gil DO - Marcial 58 Son Francheska Rhodes MD - Assisting    Preop Diagnosis:  Superficial femoral artery occlusion ([address] ) [I70 209]    Post-Op Diagnosis Codes:     * Superficial femoral artery occlusion ([address] ) [I70 209]    Procedure(s) (LRB):  Left Common Femoral Below Knee to Popliteal Bypass with Insitu GSV graft  Left Lower Extremity Angiogram (Left)    Specimen(s):  * No specimens in log *    Estimated Blood Loss:   200 mL    Drains:  External Urinary Catheter (Active)   Collection Container Canister and suction tubing (For Female) 09/15/21 1201   Suction Pressure (mmHg) 100 mmHg 09/15/21 1201   Interventions Suction tubing changed;Suction canister changed;Device changed 09/15/21 1201   Number of days: 0       [REMOVED] Urethral Catheter Latex 16 Fr  (Removed)   Chen Care Done 21 2100   Collection Container Standard drainage bag 21   Securement Method Securing device (Describe) 21   Output (mL) 975 mL 09/15/21 0558   Number of days: 1       Anesthesia Type:   General    Operative Indications:  Superficial femoral artery occlusion ([address] ) [I70 209]    Ms Thurmond Osler is a 31yo female with PAD and recurrent left SFA stent occlusion with left leg rest pain  She presents for a left femoral - popliteal bypass with greater saphenous vein  All risks and benefits of the procedure were discussed with the patient and informed consent was obtained  Operative Findings:  Left leg angiogram - patent CFA  Profunda is patent but the SFA stent is mostly covering the origin of the profunda  The origin of the SFA stent is patent but the remainder of the SFA is occluded   There is a very large aberrant medial circumflex branch coming off the CFA which appears to be providing the majority of the perfusion to the left thigh  The CFA-below knee popliteal artery bypass is patent  Two large branches were noted in the mid-distal thigh and were ligated with improved flow down through the bypass post-ligation  The distal anastomosis is patulous but widely patent without significant stenosis  The AT and peroneal arteries become diminutive in the mid-calf with primarily single vessel runoff to the foot via the posterior tibial artery  Palpable bypass graft pulse and multiphasic left PT signal at the end of the procedure  Complications:   None    Procedure and Technique:    A qualified surgical resident was available to assist in this procedure  Assistance was required to aid in dissection, retraction and arterial reconstruction  General anesthesia was administered  A radial A-line was placed  The left greater saphenous vein was identified with ultrasound and marked  Tributaries to the greater saphenous vein were also marked under ultrasound guidance  The left leg was prepped and draped in the normal sterile fashion with chlorhexidine prep  An Ioban drape was placed to secure the drapes proximally  A curvilinear incision was made below the inguinal ligament overlying the left common femoral artery  The common femoral artery, profunda femoris artery and superficial femoral artery were individually identified and dissected free, then encircled with vessel loops  The greater saphenous vein was identified and dissected free over the length of the incision  Multiple branches which had been marked previously were then exposed via separate incisions in the thigh  Any branches were ligated with clips or ties  A 2nd incision was made overlying the below knee popliteal artery  The greater saphenous vein was initially identified and dissected free over the length of the incisions  Multiple branches were ligated    The below knee popliteal artery was then exposed via the same incision and encircled proximally and distally with vessel loops  Dissection was challenging due to obesity and depth of the artery  IV heparin was then administered  ACT levels were obtained  Further heparin boluses were administered to maintain a therapeutic level  The greater saphenous vein was then clamped just distal to the common femoral vein  There was a common junction with the anterior accessory saphenous vein as well requiring ligation of both superficial veins  The GSV was transected and the stump on the CFV was oversewn in a Tay fashion with 5-0 prolene  The AASV was ligated distally and transected  The common femoral artery, profunda femoris artery, superficial femoral artery was then occluded with a combination of vessel loops and vascular clamps  An arteriotomy was created on the anterior surface with an 11 blade and Chapman scissors  The GSV was then spatulated at the appropriate length and anastomosed with a running  6-0 prolene suture  Once the anastomosis was nearly completed all vessels were back bled and flushed appropriately  No backbleeding was noted from the SFA  There was good backbleeding from the PFA  The anastomosis was then fully completed and flow was restored  No bleeding points were encountered  The greater saphenous vein was then ligated distally  The exposed greater saphenous vein was then dilated  The vein was adequate in caliber  A Urisol valvulotome was then passed proximally and valves were lysed  Inflow was obtained and was pulsatile  A bulldog clamp was then placed on the distal vein and attention was turned to the distal anastomosis  The vein was then occluded proximally distally with vessel loops  An arteriotomy was created with 11 blade and Chapman scissors  The graft was spatulated at the appropriate length and anastomosed with a running 7-0 prolene suture  Once this anastomosis was nearly completed all vessels were back bled and flushed appropriately    The anastomosis was then completed and flow was restored  No bleeding points were encountered  An arteriogram was then performed by puncturing the common femoral artery with a micropuncture needle  The inner cannula of the micropuncture set was then passed and connected to IV tubing  Stepped images of the arteriogram were then performed which showed wide patency of the proximal and distal anastomoses  There was sluggish flow noted in the distal segment of the vein to the distal anastomosis  Large branches were identified in the mid-distal thigh coming off the GSV  These branches were ligated  Repeat images were performed with rapid flow through the distal anastomosis but the contrast appeared to fade out  The mid-distal GSV was accessed with a butterfly needle and additional images were performed of the outflow  Rapid flow was noted through the remainder of the bypass at this level and beyond  Outflow was via the posterior tibial artery  All additional findings are described above  The decision was made not to remove the proximal SFA stent given that the profunda femoris artery was small in caliber and did not provide most of the perfusion to the thigh  The micropuncture catheter was removed and a 5-0 prolene Prolene suture was used to close this puncture site  The butterfly needle was removed and the puncture site was also closed with a 6-0 prolene suture with good hemostasis  There was a bounding pulse over the bypass graft and multiphasic signals beyond the anastomosis and PT artery at the medial malleolus  The wounds were then irrigated with antibiotic laden solution  Any residual bleeding points were coagulated or clipped  The incisions were then closed in multiple layers using a combination of 2 0 and 3 0 Monocryl suture  The skin was then closed with 4-0 monocryl at the groin and staples at the remaining incisions  Sterile dressings were placed with mepilex      At the conclusion of the procedure the patient had a dopplerable multiphasic signal in the posterior tibial artery  The patient was then woken from anesthesia, extubated and transferred to the recovery room  I was present for the entire procedure    Patient Disposition:  PACU  and hemodynamically stable    Vascular Quality Initiative - Infra-Inguinal Bypass      Urgency: Urgent Anesthesia: General    Side: left  Skin Prep: Chlorhexidine    Graft Origin: Common Femoral    Graft Recipient: BK Pop    Graft Vein Type: In Situ GSV    Number of Vein Segments: 1    Prosthetic: None      Groin Incision: NONE/HORIZONTAL/VERTICAL: Horizontal   Groin Closure Type: Absorbable Subcuticular  Closure Dressing is: Cyanoacrylate Adhesive        Total procedure Time: 6h 25min    If Graft Vein: Vein [place] Incision: Skip    Vein Graft Location: Sub-cutaneous    Adjuncts:  Vein Cuff:  no Sequential Graft:no    Concomitant Proximal Ipsilateral:   PVI: no   Endarterectomy:no   Supra-inguinal Bypass: no    Completion Study:   Doppler: yes   Duplex: no    Arteriogram: yes      SIGNATURE: Mishel Hankins MD  DATE: September 15, 2021  TIME: 4:40 PM

## 2021-09-16 ENCOUNTER — DOCUMENTATION (OUTPATIENT)
Dept: VASCULAR SURGERY | Facility: CLINIC | Age: 36
End: 2021-09-16

## 2021-09-16 PROBLEM — I70.202: Status: ACTIVE | Noted: 2021-09-08

## 2021-09-16 LAB
ANION GAP SERPL CALCULATED.3IONS-SCNC: 3 MMOL/L (ref 4–13)
BUN SERPL-MCNC: 16 MG/DL (ref 5–25)
CALCIUM SERPL-MCNC: 8 MG/DL (ref 8.3–10.1)
CHLORIDE SERPL-SCNC: 104 MMOL/L (ref 100–108)
CO2 SERPL-SCNC: 27 MMOL/L (ref 21–32)
CREAT SERPL-MCNC: 0.61 MG/DL (ref 0.6–1.3)
ERYTHROCYTE [DISTWIDTH] IN BLOOD BY AUTOMATED COUNT: 13.6 % (ref 11.6–15.1)
GFR SERPL CREATININE-BSD FRML MDRD: 137 ML/MIN/1.73SQ M
GLUCOSE SERPL-MCNC: 155 MG/DL (ref 65–140)
GLUCOSE SERPL-MCNC: 185 MG/DL (ref 65–140)
GLUCOSE SERPL-MCNC: 185 MG/DL (ref 65–140)
GLUCOSE SERPL-MCNC: 214 MG/DL (ref 65–140)
GLUCOSE SERPL-MCNC: 240 MG/DL (ref 65–140)
HCT VFR BLD AUTO: 37.1 % (ref 34.8–46.1)
HGB BLD-MCNC: 13.2 G/DL (ref 11.5–15.4)
MCH RBC QN AUTO: 28.3 PG (ref 26.8–34.3)
MCHC RBC AUTO-ENTMCNC: 35.6 G/DL (ref 31.4–37.4)
MCV RBC AUTO: 80 FL (ref 82–98)
PLATELET # BLD AUTO: 140 THOUSANDS/UL (ref 149–390)
PMV BLD AUTO: 10.5 FL (ref 8.9–12.7)
POTASSIUM SERPL-SCNC: 4.2 MMOL/L (ref 3.5–5.3)
RBC # BLD AUTO: 4.66 MILLION/UL (ref 3.81–5.12)
SODIUM SERPL-SCNC: 134 MMOL/L (ref 136–145)
WBC # BLD AUTO: 3.92 THOUSAND/UL (ref 4.31–10.16)

## 2021-09-16 PROCEDURE — 85027 COMPLETE CBC AUTOMATED: CPT | Performed by: PHYSICIAN ASSISTANT

## 2021-09-16 PROCEDURE — 99231 SBSQ HOSP IP/OBS SF/LOW 25: CPT | Performed by: FAMILY MEDICINE

## 2021-09-16 PROCEDURE — 80048 BASIC METABOLIC PNL TOTAL CA: CPT | Performed by: PHYSICIAN ASSISTANT

## 2021-09-16 PROCEDURE — 99232 SBSQ HOSP IP/OBS MODERATE 35: CPT | Performed by: INTERNAL MEDICINE

## 2021-09-16 PROCEDURE — 93005 ELECTROCARDIOGRAM TRACING: CPT

## 2021-09-16 PROCEDURE — 99232 SBSQ HOSP IP/OBS MODERATE 35: CPT | Performed by: PHYSICIAN ASSISTANT

## 2021-09-16 PROCEDURE — 94760 N-INVAS EAR/PLS OXIMETRY 1: CPT

## 2021-09-16 PROCEDURE — 82948 REAGENT STRIP/BLOOD GLUCOSE: CPT

## 2021-09-16 PROCEDURE — NC001 PR NO CHARGE: Performed by: SURGERY

## 2021-09-16 RX ORDER — TRAZODONE HYDROCHLORIDE 50 MG/1
200 TABLET ORAL
Status: DISCONTINUED | OUTPATIENT
Start: 2021-09-16 | End: 2021-09-17 | Stop reason: HOSPADM

## 2021-09-16 RX ORDER — INSULIN GLARGINE 100 [IU]/ML
45 INJECTION, SOLUTION SUBCUTANEOUS
Status: DISCONTINUED | OUTPATIENT
Start: 2021-09-16 | End: 2021-09-17 | Stop reason: HOSPADM

## 2021-09-16 RX ADMIN — HYDROMORPHONE HYDROCHLORIDE 1 MG: 1 INJECTION, SOLUTION INTRAMUSCULAR; INTRAVENOUS; SUBCUTANEOUS at 07:25

## 2021-09-16 RX ADMIN — GABAPENTIN 300 MG: 300 CAPSULE ORAL at 08:22

## 2021-09-16 RX ADMIN — NICOTINE 1 PATCH: 21 PATCH, EXTENDED RELEASE TRANSDERMAL at 08:27

## 2021-09-16 RX ADMIN — GABAPENTIN 300 MG: 300 CAPSULE ORAL at 16:40

## 2021-09-16 RX ADMIN — ACETAMINOPHEN 975 MG: 325 TABLET, FILM COATED ORAL at 12:26

## 2021-09-16 RX ADMIN — SENNOSIDES AND DOCUSATE SODIUM 2 TABLET: 8.6; 5 TABLET ORAL at 22:19

## 2021-09-16 RX ADMIN — CHLORPROMAZINE HYDROCHLORIDE 100 MG: 25 TABLET, FILM COATED ORAL at 22:23

## 2021-09-16 RX ADMIN — ACETAMINOPHEN 975 MG: 325 TABLET, FILM COATED ORAL at 00:33

## 2021-09-16 RX ADMIN — INSULIN LISPRO 2 UNITS: 100 INJECTION, SOLUTION INTRAVENOUS; SUBCUTANEOUS at 11:54

## 2021-09-16 RX ADMIN — HYDROMORPHONE HYDROCHLORIDE 4 MG: 2 TABLET ORAL at 05:10

## 2021-09-16 RX ADMIN — LURASIDONE HYDROCHLORIDE 80 MG: 80 TABLET, FILM COATED ORAL at 08:21

## 2021-09-16 RX ADMIN — RIVAROXABAN 2.5 MG: 2.5 TABLET, FILM COATED ORAL at 16:44

## 2021-09-16 RX ADMIN — RIVAROXABAN 2.5 MG: 2.5 TABLET, FILM COATED ORAL at 08:21

## 2021-09-16 RX ADMIN — HYDROMORPHONE HYDROCHLORIDE 1 MG: 1 INJECTION, SOLUTION INTRAMUSCULAR; INTRAVENOUS; SUBCUTANEOUS at 00:34

## 2021-09-16 RX ADMIN — ATORVASTATIN CALCIUM 40 MG: 40 TABLET, FILM COATED ORAL at 16:40

## 2021-09-16 RX ADMIN — CYCLOBENZAPRINE HYDROCHLORIDE 5 MG: 5 TABLET, FILM COATED ORAL at 22:19

## 2021-09-16 RX ADMIN — HYDROMORPHONE HYDROCHLORIDE 4 MG: 2 TABLET ORAL at 09:12

## 2021-09-16 RX ADMIN — CYCLOBENZAPRINE HYDROCHLORIDE 5 MG: 5 TABLET, FILM COATED ORAL at 16:40

## 2021-09-16 RX ADMIN — INSULIN LISPRO 4 UNITS: 100 INJECTION, SOLUTION INTRAVENOUS; SUBCUTANEOUS at 08:22

## 2021-09-16 RX ADMIN — HYDROMORPHONE HYDROCHLORIDE 1 MG: 1 INJECTION, SOLUTION INTRAMUSCULAR; INTRAVENOUS; SUBCUTANEOUS at 10:53

## 2021-09-16 RX ADMIN — INSULIN GLARGINE 45 UNITS: 100 INJECTION, SOLUTION SUBCUTANEOUS at 22:20

## 2021-09-16 RX ADMIN — HYDROMORPHONE HYDROCHLORIDE 4 MG: 2 TABLET ORAL at 15:12

## 2021-09-16 RX ADMIN — HYDROMORPHONE HYDROCHLORIDE 4 MG: 2 TABLET ORAL at 22:22

## 2021-09-16 RX ADMIN — GABAPENTIN 300 MG: 300 CAPSULE ORAL at 22:20

## 2021-09-16 RX ADMIN — ACETAMINOPHEN 975 MG: 325 TABLET, FILM COATED ORAL at 17:09

## 2021-09-16 RX ADMIN — ASPIRIN 81 MG CHEWABLE TABLET 81 MG: 81 TABLET CHEWABLE at 08:22

## 2021-09-16 RX ADMIN — HYDROMORPHONE HYDROCHLORIDE 1 MG: 1 INJECTION, SOLUTION INTRAMUSCULAR; INTRAVENOUS; SUBCUTANEOUS at 16:41

## 2021-09-16 RX ADMIN — TRAZODONE HYDROCHLORIDE 200 MG: 50 TABLET ORAL at 22:20

## 2021-09-16 RX ADMIN — CYCLOBENZAPRINE HYDROCHLORIDE 5 MG: 5 TABLET, FILM COATED ORAL at 08:21

## 2021-09-16 RX ADMIN — INSULIN LISPRO 4 UNITS: 100 INJECTION, SOLUTION INTRAVENOUS; SUBCUTANEOUS at 16:43

## 2021-09-16 RX ADMIN — KETAMINE HYDROCHLORIDE 0.2 MG/KG/HR: 50 INJECTION, SOLUTION INTRAMUSCULAR; INTRAVENOUS at 09:09

## 2021-09-16 RX ADMIN — PRAZOSIN HYDROCHLORIDE 1 MG: 1 CAPSULE ORAL at 22:19

## 2021-09-16 NOTE — PROGRESS NOTES
Progress Notes - Family Medicine Residency, [facility] [place] 1985, 30 y o  female  MRN: [id number]    Unit/Bed#: [facility] 716-93 Encounter: [id number]  Primary Care Provider: Garry Mendiola DO      Admission Date: 9/8/2021 0648  Length of Stay: 7 days  Code Status:  Level 1 - Full Code  Consult:   INPATIENT CONSULT TO IR  IP CONSULT TO HEMATOLOGY  IP CONSULT TO CASE MANAGEMENT  IP CONSULT TO CARDIOLOGY  IP CONSULT TO PICC TEAM  IP CONSULT TO NUTRITION SERVICES  IP CONSULT TO [address] [place] [address] Course & 24hr events:     35-YOM admitted 9/8/2021, now HD#8, for left leg atherosclerosis concerning for left SFA stent reocclusion most likely in the setting of Plavix noncompliance  Pt is POD#2 s/p cwlbzrv-cz-fvyqheihi bypass (9/14) and resumed on her home ASA and Xarelto  Vascular surgery, IR, and APS following  Overnight nursing staff noted increased manic behavior on Ketamine gtt and did not sleep much last night  Pt noted increasing pain to left groin but is very happy she has regained sensation and motor func to her LLE  Left pedal pulse is still absent and left toes are still slightly colder than right  Otherwise pain is tolerable with current regimen and APS is following  Plan to continue to optimize pain and blood sugar control  Assessments & Plans:   Plans discussed with [facility] team and finalization is pending attending physician attestation  * Atherosclerosis of left leg ([facility] )  Assessment & Plan  H/o Left leg atherosclerosis s/p left SFA stent on 0/27, complicated by subsequent stent thrombosis and thrombectomy on 8/25  Pt presented to ED with severe left leg pain and numbness concerning for left SFA stent reocclusion most likely in the setting of Plavix noncompliance    - Pt was on home Xarelto and Plavix  - S/p gtgtrxa-hx-cihrwxdlm bypass 9/14  - C/w ASA 81mg daily and Xarelto 2 5mg BID  - Analgesic: Tylenol scheduled, Ketamine @ 0 2mg/kg/hr, Dilaudid PO 2/4mg q3 for mod/sev, Dilaudid 1mg IV q3 for BTP   - Vascular surgery, IR, and APS following    Hypertension  Assessment & Plan  BP has been well controlled  - Last Blood Pressure: 118/82 (97/17/62 6655)  - Systolic (50EJH), QWN:998 , Min:104 , Max:137   - C/w Prazosin 1mg qHS  - Vitals per unit routine    Type 2 diabetes mellitus with diabetic polyneuropathy, with long-term current use of insulin ([facility] )  Assessment & Plan  Uncontrolled as outpatient  - HgbA1c 8 5 on 9/8/21  - On home Lantus 40U qHS  - Average BG last 72 hours: (P) 499 [id number]  - C/w Lantus 40U qHS post-op  - Pt received 16U Lispro over last 24 hr  - Consider increasing Lantus to 45U qHS given elevated BG    Results from last 7 days   Lab Units 09/15/21  2046 09/15/21  1553 09/15/21  1106 09/15/21  0607 09/14/21  1956   POC GLUCOSE mg/dl 340* 266* 252* 238* 212*       Bipolar disorder (HCC)  Assessment & Plan  Hypomanic mood  - On home Thorazine 100 mg q h s , Latuda 80 mg, trazodone 200 mg,  Seroquel 300 mg daily   - C/w Thorazine and Latuda  - Closely monitor mentation  - Consider resuming home Trazodone and Seroquel as appropriate    Peripheral artery disease (HCC)  Assessment & Plan  Please see A&P on "Atherosclerosis of left leg"      Patient Active Problem List   Diagnosis    Encounter for gynecological examination without abnormal finding    Possible exposure to STD    Headache    Hypertension    Type 2 diabetes mellitus with diabetic polyneuropathy, with long-term current use of insulin ([facility] )    Hypertensive urgency    Paresthesia    COPD (chronic obstructive pulmonary disease) ([facility])    Nicotine dependence    Bipolar disorder ([facility])    Syncope    Cellulitis    Toe infection    Toe pain, left    Deliberate self-cutting    Left foot pain    COVID-19    Diarrhea    Peripheral artery disease ([facility] )    Body mass index (BMI)40 0-44 9, adult ([facility])    Bursitis of left knee    Superficial femoral artery occlusion ([facility])    Acute pain of left knee    Atherosclerosis of left leg (HCC)       Diet: Diet Paul/CHO Controlled; Consistent Carbohydrate Diet Level 3 (6 carb servings/90 grams CHO/meal)    VTE Pharm PPX: Reason for no pharmacologic prophylaxis : On home Xarelto  VTE [facility] PPX: sequential compression device      Vitals:     Vitals:    09/15/21 1509 09/15/21 2010 09/15/21 2310 09/16/21 0253   BP: 105/65  118/75 118/82   BP Location: Left arm  Right arm Right arm   Pulse: (!) 107  101 101   Resp: 18  17 17   Temp: 98 5 °F (36 9 °C)  98 6 °F (37 °C) 98 5 °F (36 9 °C)   TempSrc: Oral  Oral Oral   SpO2: 97% 100% 97% 97%   Weight:       Height:         Temp:  [97 4 °F (36 3 °C)-98 6 °F (37 °C)] 98 5 °F (36 9 °C)  HR:  [] 101  Resp:  [16-18] 17  BP: (104-137)/(56-82) 118/82  Weight (last 2 days)     None          Intake/Output Summary (Last 24 hours) at 9/16/2021 0654  Last data filed at 9/16/2021 0037  Gross per 24 hour   Intake 1828 41 ml   Output 1350 ml   Net 478 41 ml     Invasive Devices     Peripherally Inserted Central Catheter Line            PICC Line 02/81/75 Left Basilic 6 days          Peripheral Intravenous Line            Peripheral IV 09/14/21 Right Hand 1 day          Drain            External Urinary Catheter <1 day                Labs:     CBC:  Results from last 7 days   Lab Units 09/15/21  0728 09/14/21  1740 09/14/21  1657 09/14/21  1527 09/14/21  1321 09/14/21  1127 09/14/21  0548 09/11/21  0638   WBC Thousand/uL 6 77  --   --   --   --   --  7 34 7 28   HEMOGLOBIN g/dL 7 9*  --   --   --   --   --  11 4* 10 5*   I STAT HEMOGLOBIN g/dl  --  9 2* 8 8* 9 5* 9 2* 9 9*  --   --    HEMATOCRIT % 22 5*  --   --   --   --   --  32 8* 30 0*   HEMATOCRIT, ISTAT %  --  27* 26* 28* 27* 29*  --   --    PLATELETS Thousands/uL 212  --   --   --   --   --  281 299   NEUTROS ABS Thousands/µL  --   --   --   --   --   --  3 87  --        CMP:  Results from last 7 days   Lab Units 09/16/21  0520 09/15/21  0728 09/14/21  1740 09/14/21  9342 09/14/21  1527 09/14/21  1321 09/14/21  1127 09/14/21  0548 09/11/21  0638 09/10/21  0533   POTASSIUM mmol/L 4 2 3 9  --   --   --   --   --  4 3 3 6 3 6   CHLORIDE mmol/L 104 106  --   --   --   --   --  105 105 106   CO2 mmol/L 27 26  --   --   --   --   --  28 26 11*   CO2, I-STAT mmol/L  --   --  26 25 26 27 27  --   --   --    BUN mg/dL 16 18  --   --   --   --   --  27* 22 28*   CREATININE mg/dL 0 61 0 75  --   --   --   --   --  0 76 0 74 0 57*   GLUCOSE, ISTAT mg/dl  --   --  177* 173* 162* 115 103  --   --   --    CALCIUM mg/dL 8 0* 7 6*  --   --   --   --   --  8 5 8 2* 7 9*   EGFR ml/min/1 73sq m 137 120  --   --   --   --   --  118 122 140       Sepsis:        Micro:  Lab Results   Component Value Date/Time    Blood Culture No Growth After 5 Days  08/22/2021 11:09 PM    Blood Culture No Growth After 5 Days  08/22/2021 11:09 PM    Gram Stain Result No No Polys or Bacteria seen 08/25/2021 10:02 AM    Body Fluid Culture, Sterile (A) 08/25/2021 08:26 AM     3+ Growth of Methicillin Resistant Staphylococcus aureus       Imaging:   No results found      Medications:     Current Facility-Administered Medications   Medication Dose Route Frequency    acetaminophen (TYLENOL) tablet 975 mg  975 mg Oral Q6H [address]    albuterol inhalation solution 2 5 mg  2 5 mg Nebulization Q6H PRN    aspirin chewable tablet 81 mg  81 mg Oral Daily    atorvastatin (LIPITOR) tablet 40 mg  40 mg Oral Daily With Dinner    chlorproMAZINE (THORAZINE) tablet 100 mg  100 mg Oral HS    cyclobenzaprine (FLEXERIL) tablet 5 mg  5 mg Oral TID    diphenhydrAMINE (BENADRYL) tablet 25 mg  25 mg Oral Q6H PRN    gabapentin (NEURONTIN) capsule 300 mg  300 mg Oral TID    HYDROmorphone (DILAUDID) injection 1 mg  1 mg Intravenous Q3H PRN    HYDROmorphone (DILAUDID) tablet 2 mg  2 mg Oral Q4H PRN    HYDROmorphone (DILAUDID) tablet 4 mg  4 mg Oral Q4H PRN    insulin glargine (LANTUS) subcutaneous injection 40 Units 0 4 mL  40 Units Subcutaneous HS  insulin lispro (HumaLOG) 100 units/mL subcutaneous injection 2-12 Units  2-12 Units Subcutaneous TID AC    ketamine 250 mg (STANDARD CONCENTRATION) IV in sodium chloride 0 9% 250 mL  0 2 mg/kg/hr Intravenous Continuous    lurasidone (LATUDA) tablet 80 mg  80 mg Oral Daily With Breakfast    nicotine (NICODERM CQ) 21 mg/24 hr TD 24 hr patch 1 patch  1 patch Transdermal Daily    ondansetron (ZOFRAN) injection 4 mg  4 mg Intravenous Q6H PRN    polyethylene glycol (MIRALAX) packet 17 g  17 g Oral Daily PRN    prazosin (MINIPRESS) capsule 1 mg  1 mg Oral HS    rivaroxaban (XARELTO) tablet 2 5 mg  2 5 mg Oral BID With Meals    senna-docusate sodium (SENOKOT S) 8 6-50 mg per tablet 2 tablet  2 tablet Oral HS       Physical Exam:     Physical Exam:     General: +Obese  Pt observed lying comfortably in bed, NAD  Not toxic/ill-appearing  No cachectic or diaphoresis  No obvious sign of trauma or bleeding  Psych: AAOx4, able to converse appropriately  Neuro: no gross neurological deficits, CN 2-12 grossly intact  Head: atraumatic, normocephalic  Eyes: open spontaneously, EOM intact, DAMON, conjunctiva non-injected, no scleral icterus, no discharge  Ear: normal external ear, no visible drainage at external auditory orifice  Nose: clear, no epistaxis, no rhinorrhea  Throat: clear, no hoarse voice, no cough  Neck: supple, normal ROM  Heart: RRR, no murmur/distant heart sound appreciated  Lungs: LCTABL, nml respiratory effort, no agonal/labored breathing, no accessory muscle use  Abdomen: soft, nontender, nondistended, normal bowel sound  Extremities:  Left groin tenderness but no swelling/fluctuance appreciated  Left leg wrapped in compression bandage with some bleed through  Improved sensation of LLE, however still < right  Pt is able to wiggle her left toes and foot but have trouble bending left knee due to pain  Absent left pedal pulse    Trace LLE edema        Dante Smith MD  PGY-2, Family Medicine  09/16/21  6:54 AM    Dear reader, please be aware that portions of my note contain dictated text  I have done my best to proof-read this note prior to signing  However, there may be occasional unnoticed errors pertaining to "sound-alike" words and/or grammar during my dictation process  If there is any words or information that is unclear or appears erroneous, please kindly let me know and I will clarify and/or addend my notes accordingly  Thank you for your understanding

## 2021-09-16 NOTE — PROGRESS NOTES
Vascular Nurse Navigator Post Op Education    Met with patient and spouse Bhanu Adams and brother in law to introduce myself as Vascular Nurse Navigator and explained my role  Patient is appropriate and accepting to education  Patient was educated with Review of written materials provided, Teachback, Explanation, Demonstration and Question & Answer on expectations of post op care and recovery on left CFA to BK-pop bypass  Patient is a smoker  (2 ppd x 17 yrs), as such Smoking effects on the lungs, tobacco triggers and Smoking cessation was reviewed  Education provided to patient and her spouse Bhanu Adams and brother in law on infection prevention, activity limitations, when to call the office, importance of follow up, and incisional care  Discharge instruction handout provided to patient to review  Provided patient with a pack of disposable washcloths, a pack of guaze, and a bottle of chlorhexidine

## 2021-09-16 NOTE — CASE MANAGEMENT
Call received from Romana Newport, [address] with [employer] [facility] through [facility] [phone]  This is program funded by [facility] and is for people involved in criminal justice system  She has contact with the patient several times per week and can transport home if needed  Patient follows with psychiatrist, Dr Christina Oliveira and counselor Scott Vila at [facility]  Patient is referred for an ICM worker or a wellness program and patient signed release for this today during Karen's visit  Patient lives in rented room on 5th floor of [address] [place] [address]  There is elevator to 4th floor and a flight of steps up  Patient is approved for housing voucher for first floor apt but there are limited options in her financial category  Patient had no IP BH adm and  no SA  in past 2 years  CM heard staff talk to patient about rehab which is NOT recommended  for the patient  A post acute care recommendation was made by your care team for [address]  Discussed Freedom of Choice with patient  [facility] referral made to [facility] as patient preference  Patient wants to return home at [place] and does NOT feel she needs rehab as someone discussed with her per her [address]

## 2021-09-16 NOTE — PROGRESS NOTES
[address] [facility]  Progress Note - Leobardo Cristina 1985, 30 y o  female MRN: [id number]  Unit/Bed#: [facility] 386-75 Encounter: [id number]  Primary Care Provider: Carlos Zuleta DO   Date and time admitted to hospital: 9/8/2021  6:48 AM    Peripheral artery disease [facility])  Assessment & Plan  29 y/o F with DM, COPD, BPD, PAD w/ L SFA PTA/stent 8/11/2021, subsequent stent thrombectomy 8/25/2021, who presents with acute Left leg pain in setting of recurrent  L SFA stent occlusion now s/p L CFA to BK-pop bypass with in situ GSV 9/14  Left PT and DP signal       Plan:  Hematology note reviewed - from vascular surgery standpoint ASA and xarelto 2 5mg BID PAD dosing is sufficient for bypass patency, will defer anticoagulation dosing to primary team  Gen diet  Continue neurovascular monitoring  Appreciate APS to assist with pain control      Subjective:   No acute events overnight  In good spirits this morning, pain is finally controlled  Motor and sensation of left foot returned  No chest pain or SOB  Vitals:  /82 (BP Location: Right arm)   Pulse 101   Temp 98 5 °F (36 9 °C) (Oral)   Resp 17   Ht 5' 1" (1 549 m)   Wt 97 5 kg (214 lb 15 2 oz)   LMP 08/22/2021   SpO2 97%   BMI 40 61 kg/m²     I/Os:  I/O last 3 completed shifts: In: 1978 4 [P O :360; I V :1618 4]  Out: 2625 [Urine:2625]  No intake/output data recorded      Lab Results and Cultures:   Lab Results   Component Value Date    WBC 6 77 09/15/2021    HGB 7 9 (L) 09/15/2021    HCT 22 5 (L) 09/15/2021    MCV 80 (L) 09/15/2021     09/15/2021     Lab Results   Component Value Date    GLUCOSE 177 (H) 09/14/2021    CALCIUM 8 0 (L) 09/16/2021    K 4 2 09/16/2021    CO2 27 09/16/2021     09/16/2021    BUN 16 09/16/2021    CREATININE 0 61 09/16/2021     Lab Results   Component Value Date    INR 0 98 09/14/2021    INR 1 21 (H) 09/08/2021    INR 1 01 08/23/2021    PROTIME 12 6 09/14/2021    PROTIME 13 5 (H) 09/08/2021    PROTIME 13 3 08/23/2021        Blood Culture:   Lab Results   Component Value Date    BLOODCX No Growth After 5 Days  08/22/2021    BLOODCX No Growth After 5 Days   08/22/2021   ,   Urinalysis:   Lab Results   Component Value Date    COLORU Yellow 08/24/2021    CLARITYU Clear 08/24/2021    SPECGRAV 1 017 08/24/2021    PHUR 5 5 08/24/2021    PHUR 7 5 08/07/2020    LEUKOCYTESUR Negative 08/24/2021    NITRITE Negative 08/24/2021    GLUCOSEU 500 (1/2%) (A) 08/24/2021    KETONESU Negative 08/24/2021    BILIRUBINUR Negative 08/24/2021    BLOODU Moderate (A) 08/24/2021   ,   Urine Culture: No results found for: URINECX,   Wound Culure: No results found for: WOUNDCULT    Medications:  Current Facility-Administered Medications   Medication Dose Route Frequency    acetaminophen (TYLENOL) tablet 975 mg  975 mg Oral Q6H [facility]    albuterol inhalation solution 2 5 mg  2 5 mg Nebulization Q6H PRN    aspirin chewable tablet 81 mg  81 mg Oral Daily    atorvastatin (LIPITOR) tablet 40 mg  40 mg Oral Daily With Dinner    chlorproMAZINE (THORAZINE) tablet 100 mg  100 mg Oral HS    cyclobenzaprine (FLEXERIL) tablet 5 mg  5 mg Oral TID    diphenhydrAMINE (BENADRYL) tablet 25 mg  25 mg Oral Q6H PRN    gabapentin (NEURONTIN) capsule 300 mg  300 mg Oral TID    HYDROmorphone (DILAUDID) injection 1 mg  1 mg Intravenous Q3H PRN    HYDROmorphone (DILAUDID) tablet 2 mg  2 mg Oral Q4H PRN    HYDROmorphone (DILAUDID) tablet 4 mg  4 mg Oral Q4H PRN    insulin glargine (LANTUS) subcutaneous injection 40 Units 0 4 mL  40 Units Subcutaneous HS    insulin lispro (HumaLOG) 100 units/mL subcutaneous injection 2-12 Units  2-12 Units Subcutaneous TID AC    ketamine 250 mg (STANDARD CONCENTRATION) IV in sodium chloride 0 9% 250 mL  0 2 mg/kg/hr Intravenous Continuous    lurasidone (LATUDA) tablet 80 mg  80 mg Oral Daily With Breakfast    nicotine (NICODERM CQ) 21 mg/24 hr TD 24 hr patch 1 patch  1 patch Transdermal Daily    ondansetron (ZOFRAN) injection 4 mg  4 mg Intravenous Q6H PRN    polyethylene glycol (MIRALAX) packet 17 g  17 g Oral Daily PRN    prazosin (MINIPRESS) capsule 1 mg  1 mg Oral HS    rivaroxaban (XARELTO) tablet 2 5 mg  2 5 mg Oral BID With Meals    senna-docusate sodium (SENOKOT S) 8 6-50 mg per tablet 2 tablet  2 tablet Oral HS       Physical Exam:    General appearance: alert and oriented, in no acute distress  Lungs: clear to auscultation bilaterally  Heart: S1, S2 normal  Abdomen: soft, non-tender; bowel sounds normal; no masses,  no organomegaly  Extremities: +M/S bilaterally, Left lat foot wound dry  Left groin incision dressing taken down, incision with staples in place c/d/i, no erythema or drainage    Pulse exam:  DP: Right: 2+ Left: doppler signal  PT: Right: 2+ Left: doppler signal      Dylan Fraser MD  9/16/2021

## 2021-09-16 NOTE — QUICK NOTE
Patient has outpatient appt with Dr Jj Hooker, 10/7  Hematology/Oncology will sign off at this point  If you have any questions or concerns in the future, please do not hesitate to reach out to us       Sara Blum [facility]-BC  Hematology/Oncology Nurse Practitioner

## 2021-09-16 NOTE — PROGRESS NOTES
Progress Note - Acute Pain Service    Abimael Christiansen 30 y o  female MRN: [id number]  Unit/Bed#: [facility] 511-01 Encounter: [id number]      Assessment:   Principal Problem: Atherosclerosis of left leg (HCC)  Active Problems:    Hypertension    Type 2 diabetes mellitus with diabetic polyneuropathy, with long-term current use of insulin ([facility])    COPD (chronic obstructive pulmonary disease) ([facility])    Nicotine dependence    Bipolar disorder ([facility] )    Peripheral artery disease ([facility] )    Abimael Christiansen is a 30 y o  female  With history of PAT status post  Left SFA stent with subsequent lysis and thrombectomy on 8/24/21  Continue to have left lower extremity ischemia and pain and underwent below knee to popliteal bypass on 9/14/21  Continue to have significant left lower extremity pain postoperatively  Plan:    Continue Tylenol 975 mg p o  q 6 hours scheduled   Continue Dilaudid 2 mg p o  q 4 hours p r n  moderate pain   Continue Dilaudid 4 mg p o  q 4 hours p r n  severe pain   Continue Dilaudid 1 mg IV q 3 hours p r n  breakthrough pain  Decrease to 0 5 mg IV q 3 hours p r n  breakthrough pain tomorrow morning   Continue ketamine infusion at 0 2 mg/ kg / HR continuous   Continue Flexeril 5 mg p o  t i d  scheduled   Continue Neurontin 300 mg p o  t i d  scheduled   Continue bowel regimen to avoid opioid induced constipation   Encourage mobility and activity  APS will continue to follow  Please contact Acute Pain Service - [facility] via [employer] from [phone] with additional questions or concerns  See Patrick or Sivan for additional contacts and after hours information  Pain History  Current pain location(s):   Left leg  Pain Scale:    9/10  Quality:  aching  24 hour history:  Patient states pain better controlled despite continued 10/10 pain scores  Ketamine increased to 0 2 mg/ kg / HR yesterday      Opioid requirement previous 24 hours:   Oxycodone 10 mg p o , Dilaudid 5 mg IV, Dilaudid 16 mg p o     Meds/Allergies   all current active meds have been reviewed, current meds:   Current Facility-Administered Medications   Medication Dose Route Frequency    acetaminophen (TYLENOL) tablet 975 mg  975 mg Oral Q6H [address]    albuterol inhalation solution 2 5 mg  2 5 mg Nebulization Q6H PRN    aspirin chewable tablet 81 mg  81 mg Oral Daily    atorvastatin (LIPITOR) tablet 40 mg  40 mg Oral Daily With Dinner    chlorproMAZINE (THORAZINE) tablet 100 mg  100 mg Oral HS    cyclobenzaprine (FLEXERIL) tablet 5 mg  5 mg Oral TID    diphenhydrAMINE (BENADRYL) tablet 25 mg  25 mg Oral Q6H PRN    gabapentin (NEURONTIN) capsule 300 mg  300 mg Oral TID    HYDROmorphone (DILAUDID) injection 1 mg  1 mg Intravenous Q3H PRN    HYDROmorphone (DILAUDID) tablet 2 mg  2 mg Oral Q4H PRN    HYDROmorphone (DILAUDID) tablet 4 mg  4 mg Oral Q4H PRN    insulin glargine (LANTUS) subcutaneous injection 40 Units 0 4 mL  40 Units Subcutaneous HS    insulin lispro (HumaLOG) 100 units/mL subcutaneous injection 2-12 Units  2-12 Units Subcutaneous TID AC    ketamine 250 mg (STANDARD CONCENTRATION) IV in sodium chloride 0 9% 250 mL  0 2 mg/kg/hr Intravenous Continuous    lurasidone (LATUDA) tablet 80 mg  80 mg Oral Daily With Breakfast    nicotine (NICODERM CQ) 21 mg/24 hr TD 24 hr patch 1 patch  1 patch Transdermal Daily    ondansetron (ZOFRAN) injection 4 mg  4 mg Intravenous Q6H PRN    polyethylene glycol (MIRALAX) packet 17 g  17 g Oral Daily PRN    prazosin (MINIPRESS) capsule 1 mg  1 mg Oral HS    rivaroxaban (XARELTO) tablet 2 5 mg  2 5 mg Oral BID With Meals    senna-docusate sodium (SENOKOT S) 8 6-50 mg per tablet 2 tablet  2 tablet Oral HS    and PTA meds:   Prior to Admission Medications   Prescriptions Last Dose Informant Patient Reported? Taking?    BD Pen Needle Micro U/F 32G X 6 MM MISC 9/7/2021 at Unknown time Self No Yes   Sig: use 1 NEEDLE to inject MEDICATION subcutaneously twice a day   Blood Glucose Monitoring Suppl (True Metrix Air Glucose Meter) w/Device KIT 9/7/2021 at Unknown time Self No Yes   Sig: use as directed   Blood Pressure Monitoring (Blood Pressure Monitor Automat) KATLYN  Self No No   Sig: Use Daily as Directed   Diclofenac Sodium (VOLTAREN) 1 %   No No   Sig: APPLY 2 G TOPICALLY 4 (FOUR) TIMES A DAY   Dulaglutide (Trulicity) 1 5 TL/4 8CK SOPN 9/7/2021 at Unknown time  No Yes   Sig: Tony HEAVEN Roberto   QUEtiapine (SEROquel) 300 mg tablet 9/7/2021 at Unknown time Self No Yes   Sig: take 1 tablet by mouth once daily   acetaminophen (TYLENOL) 325 mg tablet Past Month at Unknown time  No Yes   Sig: Take 2 tablets (650 mg total) by mouth every 6 (six) hours as needed for mild pain   albuterol (2 5 mg/3 mL) 0 083 % nebulizer solution 9/7/2021 at Unknown time  No Yes   Sig: TAKE 3 ML VIA NEBULIZER EVERY 6 (SIX) HOURS AS NEEDED FOR WHEEZING OR SHORTNESS OF BREATH   atorvastatin (LIPITOR) 40 mg tablet Not Taking at Unknown time Self Yes No   Patient not taking: Reported on 9/8/2021   chlorproMAZINE (THORAZINE) 100 mg tablet 9/7/2021 at Unknown time Self Yes Yes   Sig: Take 100 mg by mouth daily at bedtime   clopidogrel (PLAVIX) 75 mg tablet Past Month at Unknown time  No Yes   Sig: Take 1 tablet (75 mg total) by mouth daily   glucose blood test strip 9/7/2021 at Unknown time  No Yes   Sig: Use one strip 3 times daily for blood glucose testing  Patient has True Metrix glucometer   glucose monitoring kit (FREESTYLE) monitoring kit 9/7/2021 at Unknown time Self No Yes   Sig: Use 1 each daily Use one each daily to check blood sugars   Please refill with freestyle ed or with whichever brand is covered by insurance   hydrOXYzine HCL (ATARAX) 50 mg tablet 9/7/2021 at Unknown time Self Yes Yes   Sig: Take 50 mg by mouth 3 (three) times a day   insulin glargine (Lantus SoloStar) 100 units/mL injection pen Past Week at Unknown time  No Yes   Sig: Inject 40 Units under the skin daily   lamoTRIgine (LaMICtal) 25 mg tablet Not Taking at Unknown time  No No   Sig: TAKE ONE (1) TABLET BY MOUTH DAILY   Patient not taking: Reported on 9/8/2021   lisinopril (ZESTRIL) 10 mg tablet 9/7/2021 at Unknown time  No Yes   Sig: TAKE TWO (2) TABLETS BY MOUTH DAILY   lurasidone (LATUDA) 80 mg tablet 9/7/2021 at Unknown time  No Yes   Sig: Take 1 tablet (80 mg total) by mouth daily with breakfast   metFORMIN (GLUCOPHAGE) 1000 MG tablet 9/7/2021 at Unknown time  No Yes   Sig: TAKE ONE (1) TABLET BY MOUTH TWICE DAILY WITH FOOD   nicotine (NICODERM CQ) 21 mg/24 hr TD 24 hr patch Not Taking at Unknown time  No No   Sig: Place 1 patch on the skin daily   Patient not taking: Reported on 9/8/2021   prazosin (MINIPRESS) 1 mg capsule 9/7/2021 at Unknown time Self Yes Yes   Sig: Take 1 mg by mouth daily at bedtime   rivaroxaban (XARELTO) 15 mg tablet 9/7/2021 at Unknown time  No Yes   Sig: Take 1 tablet (15 mg total) by mouth 2 (two) times a day with meals   traZODone (DESYREL) 100 mg tablet Not Taking at Unknown time Self No No   Sig: TAKE 2 TABLETS BY MOUTH DAILY AT BEDTIME   Patient not taking: Reported on 9/8/2021      Facility-Administered Medications Last Administration Doses Remaining   lidocaine (LMX) 4 % cream None recorded           No Known Allergies    Objective     Temp:  [97 4 °F (36 3 °C)-98 6 °F (37 °C)] 98 1 °F (36 7 °C)  HR:  [100-107] 100  Resp:  [17-18] 17  BP: (105-137)/(65-82) 112/75    Physical Exam  Vitals and nursing note reviewed  Constitutional:       General: She is awake  She is not in acute distress  Appearance: She is not ill-appearing, toxic-appearing or diaphoretic  Eyes:      Conjunctiva/sclera: Conjunctivae normal       Pupils: Pupils are equal, round, and reactive to light  Cardiovascular:      Rate and Rhythm: Normal rate and regular rhythm  Skin:     General: Skin is warm and dry  Neurological:      Mental Status: She is alert and oriented to person, place, and time        GCS: GCS eye subscore is 4  GCS verbal subscore is 5  GCS motor subscore is 6  Psychiatric:         Speech: Speech normal          Behavior: Behavior normal  Behavior is cooperative  Lab Results:   Results from last 7 days   Lab Units 09/15/21  0728   WBC Thousand/uL 6 77   HEMOGLOBIN g/dL 7 9*   HEMATOCRIT % 22 5*   PLATELETS Thousands/uL 212      Results from last 7 days   Lab Units 09/16/21  0520 09/14/21  1740   POTASSIUM mmol/L 4 2  --    CHLORIDE mmol/L 104  --    CO2 mmol/L 27  --    CO2, I-STAT mmol/L  --  26   BUN mg/dL 16  --    CREATININE mg/dL 0 61  --    CALCIUM mg/dL 8 0*  --    GLUCOSE, ISTAT mg/dl  --  177*       Imaging Studies: I have personally reviewed pertinent reports  EKG, Pathology, and Other Studies: I have personally reviewed pertinent reports  Counseling / Coordination of Care  Total floor / unit time spent today 30 minutes  Greater than 50% of total time was spent with the patient and / or family counseling and / or coordination of care  A description of the counseling / coordination of care:  Patient interview, physical examination, review of medical record, review of imaging and laboratory data, development of pain management plan, discussion of pain management plan with patient and primary service  Please note that the APS provides consultative services regarding pain management only  With the exception of ketamine and epidural infusions and except when indicated, final decisions regarding starting or changing doses of analgesic medications are at the discretion of the consulting service  Off hours consultation and/or medication management is generally not available      Arthur Rawls PA-C  Acute Pain Service

## 2021-09-16 NOTE — DISCHARGE INSTR - OTHER ORDERS
Incisional care:  · Shower daily  · Wash incision daily with soap and water  Pat dry thoroughly    · Place clean, dry gauze to cover groin incision to keep area clean and dry and to prevent skin-skin contact

## 2021-09-16 NOTE — PROGRESS NOTES
Progress Note - Cardiology   Josh Malik 30 y o  female MRN: [id number]  Unit/Bed#: [facility] 511-01 Encounter: [id number]        Principal Problem: Atherosclerosis of left leg (HCC)  Active Problems:    Hypertension    Type 2 diabetes mellitus with diabetic polyneuropathy, with long-term current use of insulin (HCC)    COPD (chronic obstructive pulmonary disease) ([facility])    Nicotine dependence    Bipolar disorder ([address] )    Peripheral artery disease ([facility])      Assessment/Plan     1  PAD S/P Lt below knee to popliteal bypass with Insitu GSV graft  H O Lt SFA stent 8/11 with subsequent lysis and thrombectomy 8/24 for stent occlusion  Presented with recurrent stent occlusion likely in the setting of medication noncompliance  Asa 81mg / AP- eliquis 2 5mg BID  Statin  Preop TTE-LVEF 60% with mild concentric LVH      2  IDDM-hemoglobin A1c 8 5  Per Medicine     3  Abnormal stress test  Done preoperatively-small, moderately severe, reversible defect basal to mid anterior wall  ? artifact  Medical management at this time and importance of compliance  Pre hospital exertional few episodes  CP/SOB  Advise for outpatient followup  Asa, statin, reluctant to add BB d/t cocaine use  tele- 's     4  COPD- tobacco abuse  Advised complete cessation     5  HTN-normotensive  PTA- lisinopril 40 mg, on hold  Prazosin 1mg HS     6  Polysubstance abuse-history of cocaine and marijuana use  Ongoing tobacco abuse- nicotine patch  Advised complete cessation    No further cardiac recommendations  Please call with questions  followup scheduled          Subjective/Objective   Chief Complaint/Subjective  Wife at bedside  Patient without complaints of chest pain or shortness of  Discussed with her outpatient follow up and  she is agreeable   C/o groin/ incisional pain        Vitals: /75   Pulse 100   Temp 98 1 °F (36 7 °C)   Resp 17   Ht 5' 1" (1 549 m)   Wt 97 5 kg (214 lb 15 2 oz)   LMP 08/22/2021   SpO2 100%   BMI 40 61 kg/m²     Vitals:    09/12/21 1043   Weight: 97 5 kg (214 lb 15 2 oz)     Orthostatic Blood Pressures      Most Recent Value   Blood Pressure  112/75 filed at 09/16/2021 0710   Patient Position - Orthostatic VS  Lying filed at 09/16/2021 0253            Intake/Output Summary (Last 24 hours) at 9/16/2021 0925  Last data filed at 9/16/2021 0037  Gross per 24 hour   Intake 1376 76 ml   Output 1350 ml   Net 26 76 ml       Invasive Devices     Peripherally Inserted Central Catheter Line            PICC Line 23/64/26 Left Basilic 6 days          Drain            External Urinary Catheter <1 day                Current Facility-Administered Medications   Medication Dose Route Frequency    acetaminophen (TYLENOL) tablet 975 mg  975 mg Oral Q6H [address]    albuterol inhalation solution 2 5 mg  2 5 mg Nebulization Q6H PRN    aspirin chewable tablet 81 mg  81 mg Oral Daily    atorvastatin (LIPITOR) tablet 40 mg  40 mg Oral Daily With Dinner    chlorproMAZINE (THORAZINE) tablet 100 mg  100 mg Oral HS    cyclobenzaprine (FLEXERIL) tablet 5 mg  5 mg Oral TID    diphenhydrAMINE (BENADRYL) tablet 25 mg  25 mg Oral Q6H PRN    gabapentin (NEURONTIN) capsule 300 mg  300 mg Oral TID    HYDROmorphone (DILAUDID) injection 1 mg  1 mg Intravenous Q3H PRN    HYDROmorphone (DILAUDID) tablet 2 mg  2 mg Oral Q4H PRN    HYDROmorphone (DILAUDID) tablet 4 mg  4 mg Oral Q4H PRN    insulin glargine (LANTUS) subcutaneous injection 40 Units 0 4 mL  40 Units Subcutaneous HS    insulin lispro (HumaLOG) 100 units/mL subcutaneous injection 2-12 Units  2-12 Units Subcutaneous TID AC    ketamine 250 mg (STANDARD CONCENTRATION) IV in sodium chloride 0 9% 250 mL  0 2 mg/kg/hr Intravenous Continuous    lurasidone (LATUDA) tablet 80 mg  80 mg Oral Daily With Breakfast    nicotine (NICODERM CQ) 21 mg/24 hr TD 24 hr patch 1 patch  1 patch Transdermal Daily    ondansetron (ZOFRAN) injection 4 mg  4 mg Intravenous Q6H PRN    polyethylene glycol (MIRALAX) packet 17 g  17 g Oral Daily PRN    prazosin (MINIPRESS) capsule 1 mg  1 mg Oral HS    rivaroxaban (XARELTO) tablet 2 5 mg  2 5 mg Oral BID With Meals    senna-docusate sodium (SENOKOT S) 8 6-50 mg per tablet 2 tablet  2 tablet Oral HS         Physical Exam: /75   Pulse 100   Temp 98 1 °F (36 7 °C)   Resp 17   Ht 5' 1" (1 549 m)   Wt 97 5 kg (214 lb 15 2 oz)   LMP 08/22/2021   SpO2 100%   BMI 40 61 kg/m²     General Appearance:    Alert, cooperative, no distress, appears older than stated age   Head:    Normocephalic, no scleral icterus   Eyes:    PERRL   Nose:   Nares normal, septum midline, no drainage    Throat:   Lips, mucosa, and tongue normal   Neck:   Supple, symmetrical, trachea midline,              Lungs:     Clear to auscultation bilaterally, respirations unlabored        Heart:    Regular rate and rhythm, S1 and S2 normal, no murmur       Extremities:   Extremities normal, atraumatic, no cyanosis ,+ edema       Skin:   Skin warm   Neurologic:   Alert and oriented to person place and time, no focal deficits                 Lab Results:   Recent Results (from the past 72 hour(s))   Fingerstick Glucose (POCT)    Collection Time: 09/13/21 11:38 AM   Result Value Ref Range    POC Glucose 83 65 - 140 mg/dl   Fingerstick Glucose (POCT)    Collection Time: 09/13/21  4:18 PM   Result Value Ref Range    POC Glucose 162 (H) 65 - 140 mg/dl   Fingerstick Glucose (POCT)    Collection Time: 09/13/21  8:53 PM   Result Value Ref Range    POC Glucose 171 (H) 65 - 140 mg/dl   hCG, quantitative, pregnancy    Collection Time: 09/14/21  5:38 AM   Result Value Ref Range    HCG, Quant <2 <=6 mIU/mL   APTT    Collection Time: 09/14/21  5:48 AM   Result Value Ref Range    PTT 76 (H) 23 - 37 seconds   CBC and differential    Collection Time: 09/14/21  5:48 AM   Result Value Ref Range    WBC 7 34 4 31 - 10 16 Thousand/uL    RBC 4 11 3 81 - 5 12 Million/uL    Hemoglobin 11 4 (L) 11 5 - 15 4 g/dL Hematocrit 32 8 (L) 34 8 - 46 1 %    MCV 80 (L) 82 - 98 fL    MCH 27 7 26 8 - 34 3 pg    MCHC 34 8 31 4 - 37 4 g/dL    RDW 13 7 11 6 - 15 1 %    MPV 10 5 8 9 - 12 7 fL    Platelets 344 750 - 876 Thousands/uL    nRBC 0 /100 WBCs    Neutrophils Relative 52 43 - 75 %    Immat GRANS % 2 0 - 2 %    Lymphocytes Relative 33 14 - 44 %    Monocytes Relative 8 4 - 12 %    Eosinophils Relative 5 0 - 6 %    Basophils Relative 0 0 - 1 %    Neutrophils Absolute 3 87 1 85 - 7 62 Thousands/µL    Immature Grans Absolute 0 11 0 00 - 0 20 Thousand/uL    Lymphocytes Absolute 2 42 0 60 - 4 47 Thousands/µL    Monocytes Absolute 0 58 0 17 - 1 22 Thousand/µL    Eosinophils Absolute 0 33 0 00 - 0 61 Thousand/µL    Basophils Absolute 0 03 0 00 - 0 10 Thousands/µL   Basic metabolic panel    Collection Time: 09/14/21  5:48 AM   Result Value Ref Range    Sodium 134 (L) 136 - 145 mmol/L    Potassium 4 3 3 5 - 5 3 mmol/L    Chloride 105 100 - 108 mmol/L    CO2 28 21 - 32 mmol/L    ANION GAP 1 (L) 4 - 13 mmol/L    BUN 27 (H) 5 - 25 mg/dL    Creatinine 0 76 0 60 - 1 30 mg/dL    Glucose 156 (H) 65 - 140 mg/dL    Calcium 8 5 8 3 - 10 1 mg/dL    eGFR 118 ml/min/1 73sq m   Protime-INR    Collection Time: 09/14/21  5:48 AM   Result Value Ref Range    Protime 12 6 11 6 - 14 5 seconds    INR 0 98 0 84 - 1 19   Fingerstick Glucose (POCT)    Collection Time: 09/14/21  6:13 AM   Result Value Ref Range    POC Glucose 163 (H) 65 - 140 mg/dl   POCT Blood Gas (CG8+)    Collection Time: 09/14/21 11:27 AM   Result Value Ref Range    pH, Art i-STAT 7 404 7 350 - 7 450    pCO2, Art i-STAT 41 4 36 0 - 44 0 mm HG    pO2, ART i-STAT 167 0 (H) 75 0 - 129 0 mm HG    BE, i-STAT 1 -2 - 3 mmol/L    HCO3, Art i-STAT 25 9 22 0 - 28 0 mmol/L    CO2, i-STAT 27 21 - 32 mmol/L    O2 Sat, i-STAT 100 (H) 60 - 85 %    SODIUM, I-STAT 136 136 - 145 mmol/l    Potassium, i-STAT 4 3 3 5 - 5 3 mmol/L    Calcium, Ionized i-STAT 1 14 1 12 - 1 32 mmol/L    Hct, i-STAT 29 (L) 34 8 - 46 1 % Hgb, i-STAT 9 9 (L) 11 5 - 15 4 g/dl    Glucose, i-STAT 103 65 - 140 mg/dl    Specimen Type ARTERIAL    POCT Blood Gas (CG8+)    Collection Time: 09/14/21  1:21 PM   Result Value Ref Range    pH, Art i-STAT 7 394 7 350 - 7 450    pCO2, Art i-STAT 41 6 36 0 - 44 0 mm HG    pO2, ART i-STAT 181 0 (H) 75 0 - 129 0 mm HG    BE, i-STAT 0 -2 - 3 mmol/L    HCO3, Art i-STAT 25 4 22 0 - 28 0 mmol/L    CO2, i-STAT 27 21 - 32 mmol/L    O2 Sat, i-STAT 100 (H) 60 - 85 %    SODIUM, I-STAT 136 136 - 145 mmol/l    Potassium, i-STAT 4 4 3 5 - 5 3 mmol/L    Calcium, Ionized i-STAT 1 04 (L) 1 12 - 1 32 mmol/L    Hct, i-STAT 27 (L) 34 8 - 46 1 %    Hgb, i-STAT 9 2 (L) 11 5 - 15 4 g/dl    Glucose, i-STAT 115 65 - 140 mg/dl    Specimen Type ARTERIAL    POCT activated clotting time    Collection Time: 09/14/21  2:07 PM   Result Value Ref Range    Activated Clotting Time, i-STAT 286 (H) 89 - 137 sec    Specimen Type ARTERIAL    POCT activated clotting time    Collection Time: 09/14/21  2:42 PM   Result Value Ref Range    Activated Clotting Time, i-STAT 243 (H) 89 - 137 sec    Specimen Type ARTERIAL    POCT activated clotting time    Collection Time: 09/14/21  3:16 PM   Result Value Ref Range    Activated Clotting Time, i-STAT 237 (H) 89 - 137 sec    Specimen Type ARTERIAL    POCT Blood Gas (CG8+)    Collection Time: 09/14/21  3:27 PM   Result Value Ref Range    pH, Art i-STAT 7 411 7 350 - 7 450    pCO2, Art i-STAT 39 3 36 0 - 44 0 mm HG    pO2, ART i-STAT 136 0 (H) 75 0 - 129 0 mm HG    BE, i-STAT 0 -2 - 3 mmol/L    HCO3, Art i-STAT 25 0 22 0 - 28 0 mmol/L    CO2, i-STAT 26 21 - 32 mmol/L    O2 Sat, i-STAT 99 (H) 60 - 85 %    SODIUM, I-STAT 135 (L) 136 - 145 mmol/l    Potassium, i-STAT 4 6 3 5 - 5 3 mmol/L    Calcium, Ionized i-STAT 1 05 (L) 1 12 - 1 32 mmol/L    Hct, i-STAT 28 (L) 34 8 - 46 1 %    Hgb, i-STAT 9 5 (L) 11 5 - 15 4 g/dl    Glucose, i-STAT 162 (H) 65 - 140 mg/dl    Specimen Type ARTERIAL    POCT activated clotting time Collection Time: 09/14/21  3:50 PM   Result Value Ref Range    Activated Clotting Time, i-STAT 249 (H) 89 - 137 sec    Specimen Type ARTERIAL    POCT activated clotting time    Collection Time: 09/14/21  4:26 PM   Result Value Ref Range    Activated Clotting Time, i-STAT 225 (H) 89 - 137 sec    Specimen Type ARTERIAL    POCT Blood Gas (CG8+)    Collection Time: 09/14/21  4:57 PM   Result Value Ref Range    pH, Art i-STAT 7 394 7 350 - 7 450    pCO2, Art i-STAT 39 1 36 0 - 44 0 mm HG    pO2, ART i-STAT 167 0 (H) 75 0 - 129 0 mm HG    BE, i-STAT -1 -2 - 3 mmol/L    HCO3, Art i-STAT 23 9 22 0 - 28 0 mmol/L    CO2, i-STAT 25 21 - 32 mmol/L    O2 Sat, i-STAT 99 (H) 60 - 85 %    SODIUM, I-STAT 135 (L) 136 - 145 mmol/l    Potassium, i-STAT 4 7 3 5 - 5 3 mmol/L    Calcium, Ionized i-STAT 1 13 1 12 - 1 32 mmol/L    Hct, i-STAT 26 (L) 34 8 - 46 1 %    Hgb, i-STAT 8 8 (L) 11 5 - 15 4 g/dl    Glucose, i-STAT 173 (H) 65 - 140 mg/dl    Specimen Type ARTERIAL    POCT Blood Gas (CG8+)    Collection Time: 09/14/21  5:40 PM   Result Value Ref Range    pH, Art i-STAT 7 394 7 350 - 7 450    pCO2, Art i-STAT 41 2 36 0 - 44 0 mm HG    pO2, ART i-STAT 174 0 (H) 75 0 - 129 0 mm HG    BE, i-STAT 0 -2 - 3 mmol/L    HCO3, Art i-STAT 25 2 22 0 - 28 0 mmol/L    CO2, i-STAT 26 21 - 32 mmol/L    O2 Sat, i-STAT 100 (H) 60 - 85 %    SODIUM, I-STAT 134 (L) 136 - 145 mmol/l    Potassium, i-STAT 5 0 3 5 - 5 3 mmol/L    Calcium, Ionized i-STAT 1 06 (L) 1 12 - 1 32 mmol/L    Hct, i-STAT 27 (L) 34 8 - 46 1 %    Hgb, i-STAT 9 2 (L) 11 5 - 15 4 g/dl    Glucose, i-STAT 177 (H) 65 - 140 mg/dl    Specimen Type ARTERIAL    Fingerstick Glucose (POCT)    Collection Time: 09/14/21  6:20 PM   Result Value Ref Range    POC Glucose 207 (H) 65 - 140 mg/dl   Fingerstick Glucose (POCT)    Collection Time: 09/14/21  7:24 PM   Result Value Ref Range    POC Glucose 223 (H) 65 - 140 mg/dl   Fingerstick Glucose (POCT)    Collection Time: 09/14/21  7:56 PM   Result Value Ref Range    POC Glucose 212 (H) 65 - 140 mg/dl   APTT    Collection Time: 09/15/21 12:39 AM   Result Value Ref Range    PTT 30 23 - 37 seconds   Fingerstick Glucose (POCT)    Collection Time: 09/15/21  6:07 AM   Result Value Ref Range    POC Glucose 238 (H) 65 - 140 mg/dl   CBC    Collection Time: 09/15/21  7:28 AM   Result Value Ref Range    WBC 6 77 4 31 - 10 16 Thousand/uL    RBC 2 82 (L) 3 81 - 5 12 Million/uL    Hemoglobin 7 9 (L) 11 5 - 15 4 g/dL    Hematocrit 22 5 (L) 34 8 - 46 1 %    MCV 80 (L) 82 - 98 fL    MCH 28 0 26 8 - 34 3 pg    MCHC 35 1 31 4 - 37 4 g/dL    RDW 13 5 11 6 - 15 1 %    Platelets 442 262 - 511 Thousands/uL    MPV 10 6 8 9 - 12 7 fL   Basic metabolic panel    Collection Time: 09/15/21  7:28 AM   Result Value Ref Range    Sodium 137 136 - 145 mmol/L    Potassium 3 9 3 5 - 5 3 mmol/L    Chloride 106 100 - 108 mmol/L    CO2 26 21 - 32 mmol/L    ANION GAP 5 4 - 13 mmol/L    BUN 18 5 - 25 mg/dL    Creatinine 0 75 0 60 - 1 30 mg/dL    Glucose 217 (H) 65 - 140 mg/dL    Calcium 7 6 (L) 8 3 - 10 1 mg/dL    eGFR 120 ml/min/1 73sq m   APTT    Collection Time: 09/15/21  7:28 AM   Result Value Ref Range     (HH) 23 - 37 seconds   Prepare Leukoreduced RBC: 2 Units    Collection Time: 09/15/21  9:17 AM   Result Value Ref Range    Unit Product Code R5488M85     Unit Number [id number]     Unit ABO B     Unit RH POS     Crossmatch Compatible     Unit Dispense Status Return to [facility]     Unit Product Volume 350 mL    Unit Product Code Y6890T07     Unit Number [id number]     Unit ABO B     Unit RH POS     Crossmatch Compatible     Unit Dispense Status Return to Inv     Unit Product Volume 350 mL   Prepare Leukoreduced RBC: 2 Units    Collection Time: 09/15/21  9:18 AM   Result Value Ref Range    Unit Product Code T3980S88     Unit Number [id number]     Unit ABO B     Unit RH POS     Crossmatch Compatible     Unit Dispense Status Return to Inv     Unit Product Volume 350 ml    Unit Product Code I1617Y80     Unit Number [id number]     Unit ABO B     Unit RH POS     Crossmatch Compatible     Unit Dispense Status Return to Inv     Unit Product Volume 350 mL   Fingerstick Glucose (POCT)    Collection Time: 09/15/21 11:06 AM   Result Value Ref Range    POC Glucose 252 (H) 65 - 140 mg/dl   Fingerstick Glucose (POCT)    Collection Time: 09/15/21  3:53 PM   Result Value Ref Range    POC Glucose 266 (H) 65 - 140 mg/dl   Dong Harvey    Collection Time: 09/15/21  4:13 PM   Result Value Ref Range    Supplier Name [address]     Supplier Phone Number [phone]     Order Status Pending Further Review     Delivery Note      Delivery Request Date 09/15/2021     Item Description Dong Harvey, Adult    [employer]    Collection Time: 09/15/21  4:15 PM   Result Value Ref Range    Supplier Name [address]     Supplier Phone Number [phone]     Order Status Supplier Submitted     Delivery Note      Delivery Request Date 09/15/2021     Item Description 3 in 1 [employer]    Fingerstick Glucose (POCT)    Collection Time: 09/15/21  8:46 PM   Result Value Ref Range    POC Glucose 340 (H) 65 - 140 mg/dl   Basic metabolic panel    Collection Time: 09/16/21  5:20 AM   Result Value Ref Range    Sodium 134 (L) 136 - 145 mmol/L    Potassium 4 2 3 5 - 5 3 mmol/L    Chloride 104 100 - 108 mmol/L    CO2 27 21 - 32 mmol/L    ANION GAP 3 (L) 4 - 13 mmol/L    BUN 16 5 - 25 mg/dL    Creatinine 0 61 0 60 - 1 30 mg/dL    Glucose 185 (H) 65 - 140 mg/dL    Calcium 8 0 (L) 8 3 - 10 1 mg/dL    eGFR 137 ml/min/1 73sq m   Fingerstick Glucose (POCT)    Collection Time: 09/16/21  6:05 AM   Result Value Ref Range    POC Glucose 240 (H) 65 - 140 mg/dl     Imaging: I have personally reviewed pertinent reports  Tele- nsr    Counseling / Coordination of Care  Total time spent today 20 minutes   Greater than 50% of total time was spent with the patient and / or family counseling and / or coordination of care

## 2021-09-16 NOTE — DISCHARGE INSTRUCTIONS
DISCHARGE INSTRUCTIONS  LEG SURGERY    Following discharge from the hospital, you may have some questions about your operation, your activities or your general condition  These instructions may answer some of your questions and help you adjust during the first few weeks following your operation  ACTIVITY:  Limit your physical activity to slow walking  Avoid climbing or heavy lifting for the first four weeks after surgery  Initially some discomfort will be felt when walking as the skin and muscle tissues heal  You may require help with walking or feel more secure with someone to lean on  Walking up steps and normal activities may be resumed, as you feel ready  You should not drive a car for one week following discharge from the hospital   You may ride in a car for short trips upon discharge  DIET:  Resume your normal diet  Try to eat low fat and low cholesterol foods  Good nutrition is important for healing of your incision  INCISION:     · Wash incision daily with soap and water  Pat dry thoroughly  · Place clean, dry gauze to cover groin incision to keep area clean and dry and to prevent skin-skin contact     You may include the operated area in a shower  Do not apply lotions, ointments, creams or powders to incision  Sitting in a tub is not recommended for the first two weeks following surgery or if you have any open wounds  It is normal to have swelling or discoloration around the incision  If increasing redness or pain develops, call our office immediately  Numbness in the region of the incision may occur following the surgery  This normally resolves in six to twelve months  If any of your incisions are open and require dressing changes, you will be given instruction for your daily incision care  If you are not able to change the dressings, a visiting nurse will be arranged  Your physician may have chosen to use a type of adhesive glue to close your incision   There are stitches present under the skin which will absorb on their own  The glue is used to cover the incision, assist in closure, and prevent contamination  This adhesive will darken and peel away on its own within one to two weeks  Alternatively, your surgeon may have chosen to use skin staples to close your incision which will be removed in the office at the time of your follow-up appointment  You may wash the incision with the staples present  LEG SWELLING: Most patients have noticeable leg swelling after leg surgery  This usually disappears within a few weeks  If swelling is present, elevate the leg whenever possible  Avoid sitting with the leg hanging down for prolonged time periods  Walking is beneficial   An ACE bandage or support stocking may be helpful, but this should be discussed with your physician prior to use  FOLLOW UP STUDIES:  Doppler ultrasound studies are very important to your post-operative care  Your surgeon will arrange for them at your first postoperative visit  Repeat studies are then scheduled every three months for the first year and periodically after this  Appt w/ Dr De La Paz Draft: 9/27/2021 at [facility]      PLEASE CALL THE OFFICE IF Jacque Kovacs    [phone] Makayla Owen [phone] [facility] FREE [phone]  [address] [facility] , [address] [facility], [place], [address] [place] [address] [address]  [address], [address], [place], [address] [facility]  [address], \[facility] [place] [facility]\"", [address]  [address] [facility], [address] [facility],[address], [facility], [address] [facility]  [address] Ver[address], [address], Gu[address]erm[address] Bess [address]  [facility] 19, [address] [facility], [address] [facility] [address]  [address] [facility], [address] [facility] [place] [facility], [address]  One [facility], 532 [facility], 30 [facility] 6  201 [facility], [facility] [address], [address]  [address] [place], [place] [address]TRINA aponte [place]

## 2021-09-17 VITALS
DIASTOLIC BLOOD PRESSURE: 63 MMHG | RESPIRATION RATE: 18 BRPM | HEIGHT: 61 IN | HEART RATE: 60 BPM | TEMPERATURE: 98.1 F | BODY MASS INDEX: 40.58 KG/M2 | SYSTOLIC BLOOD PRESSURE: 133 MMHG | WEIGHT: 214.95 LBS | OXYGEN SATURATION: 99 %

## 2021-09-17 PROBLEM — L08.9 TOE INFECTION: Status: RESOLVED | Noted: 2020-09-22 | Resolved: 2021-09-17

## 2021-09-17 PROBLEM — Z20.2 POSSIBLE EXPOSURE TO STD: Status: RESOLVED | Noted: 2020-02-10 | Resolved: 2021-09-17

## 2021-09-17 PROBLEM — L03.90 CELLULITIS: Status: RESOLVED | Noted: 2020-09-22 | Resolved: 2021-09-17

## 2021-09-17 PROBLEM — R55 SYNCOPE: Status: RESOLVED | Noted: 2020-08-07 | Resolved: 2021-09-17

## 2021-09-17 PROBLEM — R19.7 DIARRHEA: Status: RESOLVED | Noted: 2021-04-25 | Resolved: 2021-09-17

## 2021-09-17 PROBLEM — Z01.419 ENCOUNTER FOR GYNECOLOGICAL EXAMINATION WITHOUT ABNORMAL FINDING: Status: RESOLVED | Noted: 2020-02-10 | Resolved: 2021-09-17

## 2021-09-17 PROBLEM — I16.0 HYPERTENSIVE URGENCY: Status: RESOLVED | Noted: 2020-08-07 | Resolved: 2021-09-17

## 2021-09-17 PROBLEM — U07.1 COVID-19: Status: RESOLVED | Noted: 2021-04-25 | Resolved: 2021-09-17

## 2021-09-17 PROBLEM — Z72.89 DELIBERATE SELF-CUTTING: Status: RESOLVED | Noted: 2020-10-13 | Resolved: 2021-09-17

## 2021-09-17 PROBLEM — M79.675 TOE PAIN, LEFT: Status: RESOLVED | Noted: 2020-10-13 | Resolved: 2021-09-17

## 2021-09-17 LAB
ANION GAP SERPL CALCULATED.3IONS-SCNC: 4 MMOL/L (ref 4–13)
ATRIAL RATE: 102 BPM
ATRIAL RATE: 95 BPM
BUN SERPL-MCNC: 15 MG/DL (ref 5–25)
CALCIUM SERPL-MCNC: 8.5 MG/DL (ref 8.3–10.1)
CHLORIDE SERPL-SCNC: 105 MMOL/L (ref 100–108)
CO2 SERPL-SCNC: 28 MMOL/L (ref 21–32)
CREAT SERPL-MCNC: 0.56 MG/DL (ref 0.6–1.3)
ERYTHROCYTE [DISTWIDTH] IN BLOOD BY AUTOMATED COUNT: 13.2 % (ref 11.6–15.1)
GFR SERPL CREATININE-BSD FRML MDRD: 141 ML/MIN/1.73SQ M
GLUCOSE SERPL-MCNC: 111 MG/DL (ref 65–140)
GLUCOSE SERPL-MCNC: 61 MG/DL (ref 65–140)
GLUCOSE SERPL-MCNC: 62 MG/DL (ref 65–140)
GLUCOSE SERPL-MCNC: 90 MG/DL (ref 65–140)
HCT VFR BLD AUTO: 20.4 % (ref 34.8–46.1)
HCT VFR BLD AUTO: 21.4 % (ref 34.8–46.1)
HGB BLD-MCNC: 7.2 G/DL (ref 11.5–15.4)
HGB BLD-MCNC: 7.5 G/DL (ref 11.5–15.4)
MCH RBC QN AUTO: 28.2 PG (ref 26.8–34.3)
MCHC RBC AUTO-ENTMCNC: 35 G/DL (ref 31.4–37.4)
MCV RBC AUTO: 81 FL (ref 82–98)
P AXIS: 58 DEGREES
P AXIS: 59 DEGREES
PLATELET # BLD AUTO: 198 THOUSANDS/UL (ref 149–390)
PMV BLD AUTO: 10.4 FL (ref 8.9–12.7)
POTASSIUM SERPL-SCNC: 3.7 MMOL/L (ref 3.5–5.3)
PR INTERVAL: 138 MS
PR INTERVAL: 144 MS
QRS AXIS: 53 DEGREES
QRS AXIS: 54 DEGREES
QRSD INTERVAL: 78 MS
QRSD INTERVAL: 84 MS
QT INTERVAL: 338 MS
QT INTERVAL: 346 MS
QTC INTERVAL: 434 MS
QTC INTERVAL: 440 MS
RBC # BLD AUTO: 2.66 MILLION/UL (ref 3.81–5.12)
SODIUM SERPL-SCNC: 137 MMOL/L (ref 136–145)
T WAVE AXIS: 16 DEGREES
T WAVE AXIS: 17 DEGREES
VENTRICULAR RATE: 102 BPM
VENTRICULAR RATE: 95 BPM
WBC # BLD AUTO: 6.51 THOUSAND/UL (ref 4.31–10.16)

## 2021-09-17 PROCEDURE — 99238 HOSP IP/OBS DSCHRG MGMT 30/<: CPT | Performed by: FAMILY MEDICINE

## 2021-09-17 PROCEDURE — 80048 BASIC METABOLIC PNL TOTAL CA: CPT

## 2021-09-17 PROCEDURE — 85027 COMPLETE CBC AUTOMATED: CPT

## 2021-09-17 PROCEDURE — 85014 HEMATOCRIT: CPT

## 2021-09-17 PROCEDURE — 82948 REAGENT STRIP/BLOOD GLUCOSE: CPT

## 2021-09-17 PROCEDURE — 93010 ELECTROCARDIOGRAM REPORT: CPT | Performed by: INTERNAL MEDICINE

## 2021-09-17 PROCEDURE — 99232 SBSQ HOSP IP/OBS MODERATE 35: CPT | Performed by: PHYSICIAN ASSISTANT

## 2021-09-17 PROCEDURE — 85018 HEMOGLOBIN: CPT

## 2021-09-17 RX ORDER — HYDROMORPHONE HCL/PF 1 MG/ML
0.5 SYRINGE (ML) INJECTION
Status: DISCONTINUED | OUTPATIENT
Start: 2021-09-17 | End: 2021-09-17 | Stop reason: HOSPADM

## 2021-09-17 RX ORDER — ASPIRIN 81 MG/1
81 TABLET, CHEWABLE ORAL DAILY
Qty: 30 TABLET | Refills: 0 | Status: SHIPPED | OUTPATIENT
Start: 2021-09-18

## 2021-09-17 RX ORDER — AMOXICILLIN 250 MG
2 CAPSULE ORAL
Qty: 30 TABLET | Refills: 0 | Status: SHIPPED | OUTPATIENT
Start: 2021-09-17

## 2021-09-17 RX ORDER — HYDROMORPHONE HYDROCHLORIDE 2 MG/1
2 TABLET ORAL EVERY 6 HOURS PRN
Qty: 20 TABLET | Refills: 0 | Status: ON HOLD | OUTPATIENT
Start: 2021-09-17 | End: 2021-09-25 | Stop reason: SDUPTHER

## 2021-09-17 RX ORDER — CYCLOBENZAPRINE HCL 5 MG
5 TABLET ORAL 3 TIMES DAILY
Qty: 45 TABLET | Refills: 0 | Status: SHIPPED | OUTPATIENT
Start: 2021-09-17

## 2021-09-17 RX ORDER — GABAPENTIN 300 MG/1
300 CAPSULE ORAL 3 TIMES DAILY
Qty: 90 CAPSULE | Refills: 0 | Status: SHIPPED | OUTPATIENT
Start: 2021-09-17 | End: 2021-10-28 | Stop reason: ALTCHOICE

## 2021-09-17 RX ADMIN — ATORVASTATIN CALCIUM 40 MG: 40 TABLET, FILM COATED ORAL at 15:12

## 2021-09-17 RX ADMIN — LURASIDONE HYDROCHLORIDE 80 MG: 80 TABLET, FILM COATED ORAL at 07:12

## 2021-09-17 RX ADMIN — GABAPENTIN 300 MG: 300 CAPSULE ORAL at 15:12

## 2021-09-17 RX ADMIN — CYCLOBENZAPRINE HYDROCHLORIDE 5 MG: 5 TABLET, FILM COATED ORAL at 15:12

## 2021-09-17 RX ADMIN — HYDROMORPHONE HYDROCHLORIDE 0.5 MG: 1 INJECTION, SOLUTION INTRAMUSCULAR; INTRAVENOUS; SUBCUTANEOUS at 07:20

## 2021-09-17 RX ADMIN — CYCLOBENZAPRINE HYDROCHLORIDE 5 MG: 5 TABLET, FILM COATED ORAL at 09:09

## 2021-09-17 RX ADMIN — HYDROMORPHONE HYDROCHLORIDE 4 MG: 2 TABLET ORAL at 04:48

## 2021-09-17 RX ADMIN — NICOTINE 1 PATCH: 21 PATCH, EXTENDED RELEASE TRANSDERMAL at 09:09

## 2021-09-17 RX ADMIN — RIVAROXABAN 2.5 MG: 2.5 TABLET, FILM COATED ORAL at 07:12

## 2021-09-17 RX ADMIN — GABAPENTIN 300 MG: 300 CAPSULE ORAL at 09:09

## 2021-09-17 RX ADMIN — ASPIRIN 81 MG CHEWABLE TABLET 81 MG: 81 TABLET CHEWABLE at 09:09

## 2021-09-17 RX ADMIN — RIVAROXABAN 2.5 MG: 2.5 TABLET, FILM COATED ORAL at 15:12

## 2021-09-17 RX ADMIN — ACETAMINOPHEN 975 MG: 325 TABLET, FILM COATED ORAL at 04:53

## 2021-09-17 NOTE — RESPIRATORY THERAPY NOTE
resp care      09/17/21 0753   Respiratory Assessment   Assessment Type Assess only   General Appearance Alert; Awake   Respiratory Pattern Normal   Chest Assessment Chest expansion symmetrical   Bilateral Breath Sounds Clear;Diminished   Resp Comments no indication for prn udn tx, no distress noted, will continue to monitor   O2 Device na

## 2021-09-17 NOTE — CASE MANAGEMENT
Cm informed by [facility] resident Dr Carlos Jackson pt is stable for d/c but needs a ride  Cm went to pt's room to discuss lyft, pt sleeping tried to arouse pt however she is unable to communicate with cm  RN informed cm pt is awake and is requesting to speak with cm  Pt signed lyft waiver and transport scheduled for 1530 at entrance A

## 2021-09-17 NOTE — PROGRESS NOTES
Progress Note - Acute Pain Service    Annamarie Miller 30 y o  female MRN: [id number]  Unit/Bed#: [facility] 511-01 Encounter: [id number]      Assessment:   Principal Problem: Atherosclerosis of left leg (HCC)  Active Problems:    Hypertension    Type 2 diabetes mellitus with diabetic polyneuropathy, with long-term current use of insulin ([facility])    COPD (chronic obstructive pulmonary disease) ([facility])    Nicotine dependence    Bipolar disorder ([facility] )    Peripheral artery disease ([facility] )    Annamarie Miller is a 30 y o  female  With history of PAT status post  Left SFA stent with subsequent lysis and thrombectomy on 21  Continue to have left lower extremity ischemia and pain and underwent below knee to popliteal bypass on 21  Continue to have significant left lower extremity pain postoperatively  Plan:    Continue Tylenol 975 mg p o  q 6 hours scheduled   Continue Dilaudid 2 mg p o  q 4 hours p r n  moderate pain   Continue Dilaudid 4 mg p o  q 4 hours p r n  severe pain   Change Dilaudid to 0 5 mg IV q 6 hours p r n  breakthrough pain  Attempt to DC over the weekend if possible or, if discharge soon, discontinue now   Continue Flexeril 5 mg p o  t i d  scheduled   Continue Neurontin 300 mg p o  t i d  scheduled   Ketamine infusion  last evening   Encourage activity and mobility based on recommendations by primary service and vascular surgery   At discharge, transition Tylenol to 975 mg p o  q 6 hours p r n  mild pain   At discharge, continue Flexeril, Neurontin as currently ordered   At discharge, suggest Dilaudid 2 mg p o  q 4 hours p r n  moderate to severe pain for 5-7 days  APS will continue to follow  Please contact Acute Pain Service - [facility] via [employer] from [phone] with additional questions or concerns  See Patrick or Sivan for additional contacts and after hours information      Pain History  Current pain location(s):  Left leg  Pain Scale: 8/10  Quality:  burning  24 hour history:  Patient continues to complain of pain at the incision site near her left knee, states the current pain regimen has been effective  Opioid requirement previous 24 hours:   Dilaudid 16 mg p o , Dilaudid 2 5 mg IV      Meds/Allergies   all current active meds have been reviewed, current meds:   Current Facility-Administered Medications   Medication Dose Route Frequency    acetaminophen (TYLENOL) tablet 975 mg  975 mg Oral Q6H [address]    albuterol inhalation solution 2 5 mg  2 5 mg Nebulization Q6H PRN    aspirin chewable tablet 81 mg  81 mg Oral Daily    atorvastatin (LIPITOR) tablet 40 mg  40 mg Oral Daily With Dinner    chlorproMAZINE (THORAZINE) tablet 100 mg  100 mg Oral HS    cyclobenzaprine (FLEXERIL) tablet 5 mg  5 mg Oral TID    diphenhydrAMINE (BENADRYL) tablet 25 mg  25 mg Oral Q6H PRN    gabapentin (NEURONTIN) capsule 300 mg  300 mg Oral TID    HYDROmorphone (DILAUDID) injection 0 5 mg  0 5 mg Intravenous Q3H PRN    HYDROmorphone (DILAUDID) tablet 2 mg  2 mg Oral Q4H PRN    HYDROmorphone (DILAUDID) tablet 4 mg  4 mg Oral Q4H PRN    insulin glargine (LANTUS) subcutaneous injection 45 Units 0 45 mL  45 Units Subcutaneous HS    insulin lispro (HumaLOG) 100 units/mL subcutaneous injection 2-12 Units  2-12 Units Subcutaneous TID AC    lurasidone (LATUDA) tablet 80 mg  80 mg Oral Daily With Breakfast    nicotine (NICODERM CQ) 21 mg/24 hr TD 24 hr patch 1 patch  1 patch Transdermal Daily    ondansetron (ZOFRAN) injection 4 mg  4 mg Intravenous Q6H PRN    polyethylene glycol (MIRALAX) packet 17 g  17 g Oral Daily PRN    prazosin (MINIPRESS) capsule 1 mg  1 mg Oral HS    rivaroxaban (XARELTO) tablet 2 5 mg  2 5 mg Oral BID With Meals    senna-docusate sodium (SENOKOT S) 8 6-50 mg per tablet 2 tablet  2 tablet Oral HS    traZODone (DESYREL) tablet 200 mg  200 mg Oral HS    and PTA meds:   Prior to Admission Medications   Prescriptions Last Dose Informant Patient Reported? Taking? BD Pen Needle Micro U/F 32G X 6 MM MISC 9/7/2021 at Unknown time Self No Yes   Sig: use 1 NEEDLE to inject MEDICATION subcutaneously twice a day   Blood Glucose Monitoring Suppl (True Metrix Air Glucose Meter) w/Device KIT 9/7/2021 at Unknown time Self No Yes   Sig: use as directed   Blood Pressure Monitoring (Blood Pressure Monitor Automat) KATLYN  Self No No   Sig: Use Daily as Directed   Diclofenac Sodium (VOLTAREN) 1 %   No No   Sig: APPLY 2 G TOPICALLY 4 (FOUR) TIMES A DAY   Dulaglutide (Trulicity) 1 5 AJ/5 5NK SOPN 9/7/2021 at Unknown time  No Yes   Sig: Tony COLLADO Pardeep   QUEtiapine (SEROquel) 300 mg tablet 9/7/2021 at Unknown time Self No Yes   Sig: take 1 tablet by mouth once daily   acetaminophen (TYLENOL) 325 mg tablet Past Month at Unknown time  No Yes   Sig: Take 2 tablets (650 mg total) by mouth every 6 (six) hours as needed for mild pain   albuterol (2 5 mg/3 mL) 0 083 % nebulizer solution 9/7/2021 at Unknown time  No Yes   Sig: TAKE 3 ML VIA NEBULIZER EVERY 6 (SIX) HOURS AS NEEDED FOR WHEEZING OR SHORTNESS OF BREATH   atorvastatin (LIPITOR) 40 mg tablet Not Taking at Unknown time Self Yes No   Patient not taking: Reported on 9/8/2021   chlorproMAZINE (THORAZINE) 100 mg tablet 9/7/2021 at Unknown time Self Yes Yes   Sig: Take 100 mg by mouth daily at bedtime   clopidogrel (PLAVIX) 75 mg tablet Past Month at Unknown time  No Yes   Sig: Take 1 tablet (75 mg total) by mouth daily   glucose blood test strip 9/7/2021 at Unknown time  No Yes   Sig: Use one strip 3 times daily for blood glucose testing  Patient has True Metrix glucometer   glucose monitoring kit (FREESTYLE) monitoring kit 9/7/2021 at Unknown time Self No Yes   Sig: Use 1 each daily Use one each daily to check blood sugars   Please refill with freestyle ed or with whichever brand is covered by insurance   hydrOXYzine HCL (ATARAX) 50 mg tablet 9/7/2021 at Unknown time Self Yes Yes   Sig: Take 50 mg by mouth 3 (three) times a day   insulin glargine (Lantus SoloStar) 100 units/mL injection pen Past Week at Unknown time  No Yes   Sig: Inject 40 Units under the skin daily   lamoTRIgine (LaMICtal) 25 mg tablet Not Taking at Unknown time  No No   Sig: TAKE ONE (1) TABLET BY MOUTH DAILY   Patient not taking: Reported on 9/8/2021   lisinopril (ZESTRIL) 10 mg tablet 9/7/2021 at Unknown time  No Yes   Sig: TAKE TWO (2) TABLETS BY MOUTH DAILY   lurasidone (LATUDA) 80 mg tablet 9/7/2021 at Unknown time  No Yes   Sig: Take 1 tablet (80 mg total) by mouth daily with breakfast   metFORMIN (GLUCOPHAGE) 1000 MG tablet 9/7/2021 at Unknown time  No Yes   Sig: TAKE ONE (1) TABLET BY MOUTH TWICE DAILY WITH FOOD   nicotine (NICODERM CQ) 21 mg/24 hr TD 24 hr patch Not Taking at Unknown time  No No   Sig: Place 1 patch on the skin daily   Patient not taking: Reported on 9/8/2021   prazosin (MINIPRESS) 1 mg capsule 9/7/2021 at Unknown time Self Yes Yes   Sig: Take 1 mg by mouth daily at bedtime   rivaroxaban (XARELTO) 15 mg tablet 9/7/2021 at Unknown time  No Yes   Sig: Take 1 tablet (15 mg total) by mouth 2 (two) times a day with meals   traZODone (DESYREL) 100 mg tablet Not Taking at Unknown time Self No No   Sig: TAKE 2 TABLETS BY MOUTH DAILY AT BEDTIME   Patient not taking: Reported on 9/8/2021      Facility-Administered Medications Last Administration Doses Remaining   lidocaine (LMX) 4 % cream None recorded           No Known Allergies    Objective     Temp:  [97 8 °F (36 6 °C)-98 9 °F (37 2 °C)] 97 8 °F (36 6 °C)  HR:  [] 60  Resp:  [18] 18  BP: ()/() 155/60    Physical Exam  Vitals and nursing note reviewed  Constitutional:       General: She is awake  She is not in acute distress  Comments:   Appears mildly uncomfortable  Eyes:      Conjunctiva/sclera: Conjunctivae normal       Pupils: Pupils are equal, round, and reactive to light  Cardiovascular:      Rate and Rhythm: Normal rate and regular rhythm  Skin:     General: Skin is warm and dry  Neurological:      Mental Status: She is alert and oriented to person, place, and time  GCS: GCS eye subscore is 4  GCS verbal subscore is 5  GCS motor subscore is 6  Psychiatric:         Speech: Speech normal          Behavior: Behavior normal  Behavior is cooperative  Lab Results:   Results from last 7 days   Lab Units 09/17/21  0453   WBC Thousand/uL 6 51   HEMOGLOBIN g/dL 7 5*   HEMATOCRIT % 21 4*   PLATELETS Thousands/uL 198      Results from last 7 days   Lab Units 09/17/21  0453 09/14/21  1740   POTASSIUM mmol/L 3 7  --    CHLORIDE mmol/L 105  --    CO2 mmol/L 28  --    CO2, I-STAT mmol/L  --  26   BUN mg/dL 15  --    CREATININE mg/dL 0 56*  --    CALCIUM mg/dL 8 5  --    GLUCOSE, ISTAT mg/dl  --  177*       Imaging Studies: I have personally reviewed pertinent reports  EKG, Pathology, and Other Studies: I have personally reviewed pertinent reports  Counseling / Coordination of Care  Total floor / unit time spent today 30 minutes  Greater than 50% of total time was spent with the patient and / or family counseling and / or coordination of care  A description of the counseling / coordination of care:  Patient interview, physical examination, review of medical record, review of imaging and laboratory data, development of pain management plan, discussion of pain management plan with patient and primary service  Please note that the APS provides consultative services regarding pain management only  With the exception of ketamine and epidural infusions and except when indicated, final decisions regarding starting or changing doses of analgesic medications are at the discretion of the consulting service  Off hours consultation and/or medication management is generally not available      Burt Mancuso PA-C  Acute Pain Service

## 2021-09-17 NOTE — DISCHARGE SUMMARY
DISCHARGE SUMMARY - Family Medicine Residency, Teri Houston 1985, 30 y o  female  MRN: [id number]    Unit/Bed#: [facility] 166-64 Encounter: [id number]  Primary Care Provider: Lorraine Hills DO    Admission Date: 9/8/2021 7406  Discharge Date: 09/17/21  Length of Stay: 8 days  Diagnosis:   Principal Problem: Atherosclerosis of left leg ([facility])  Active Problems:    Peripheral artery disease ([place] [address] )    Hypertension    Type 2 diabetes mellitus with diabetic polyneuropathy, with long-term current use of insulin ([facility])    COPD (chronic obstructive pulmonary disease) ([facility])    Nicotine dependence    Bipolar disorder ([place] [address] )      ASSESSMENTS & PLANS:     * Atherosclerosis of left leg ([place] [address] )  Assessment & Plan  H/o Left leg atherosclerosis s/p left SFA stent on 3/04, complicated by subsequent stent thrombosis and thrombectomy on 8/25  Pt presented to ED with severe left leg pain and numbness concerning for left SFA stent reocclusion most likely in the setting of Plavix noncompliance    - Pt was on home Xarelto and Plavix  - S/p xgsozcs-xq-cbeqhcrre bypass 9/14  - Vascular surgery, IR, and APS following  - Per Vascular surgery resident Modesto Adamson, pt is is okay to d/c on home aspirin and xarelto  - D/c with ASA 81mg daily and Xarelto 2 5mg BID  - D/c pain medications will include: Tylenol 975 mg prn q 6 hrs for mild pain, flexeril 5 mg TID, Dilaudid 2 mg prn q 6 hrs for severe pain (5 days), Neurontin 300 TID  - f/u with outpatient vascular surgery and PCP    Peripheral artery disease ([place] [address] )  Assessment & Plan  Please see A&P on "Atherosclerosis of left leg"    Bipolar disorder ([place] [address] )  Assessment & Plan  Hypomanic mood  - On home Thorazine 100 mg q h s , Latuda 80 mg, trazodone 200 mg,  Seroquel 300 mg daily   - C/w Thorazine and Latuda  - Restarted trazodone 200 mg yesterday    Nicotine dependence  Assessment & Plan  continue nicotine replacement   tobacco cessation counseling    COPD (chronic obstructive pulmonary disease) ([place] [address] )  Assessment & Plan   unclear history of COPD, no PFT in chart   patient currently only on albuterol neb p r n  no other active inhalers noted    Type 2 diabetes mellitus with diabetic polyneuropathy, with long-term current use of insulin ([place] [address] )  Assessment & Plan  Uncontrolled as outpatient  - HgbA1c 8 5 on 9/8/21  - On home Lantus 40U qHS  - Will d/c today on Lantus 40U and diabetic diet counseling   - f/u with PCP    Hypertension  Assessment & Plan  BP has been well controlled during hospital stay  - Last Blood Pressure: 118/82 (56/19/51 8155)  - Systolic (33KQE), DNF:313 , Min:104 , Max:137   - C/w Prazosin 1mg qHS    Patient Active Problem List   Diagnosis    Headache    Hypertension    Type 2 diabetes mellitus with diabetic polyneuropathy, with long-term current use of insulin (HCC)    Paresthesia    [facility]ch[facility]) [facility])    Nicotine dependence    Bipolar disorder ([place] [address] )    Left foot pain    Peripheral artery disease ([place] [address] )    Body mass index (BMI)40 0-44 9, adult (HCC)    Bursitis of left knee    Superficial femoral artery occlusion (HCC)    Acute pain of left knee    Atherosclerosis of left leg (HCC)         HPI (per admission H&P note on 9/8/21)     72-year-old female with past medical history of bipolar disorder, type 2 diabetes, hypertension, atherosclerosis of left leg with status post left SFA stent on 64/60, complicated by stent thrombosis, had thrombectomy on 08/25 presented today for left leg pain and numbness  Patient was on Xarelto and Plavix after the stent placement  Patient reports she started noticing the  Severe left leg pain yesterday around 6:00 a m  in the morning associated with numbness and  Tingling  Patient denies other symptoms including headaches, lightheadedness, shortness of breath, chest pain, fevers, abdominal pain, changes in bladder or bowel habits    Patient reports not taking Plavix stating that she does not have it at home  HOSPITAL COURSE:     Hospital Course:   30 y o  female admitted on 2021 for acute L leg pain s/p thrombosis and thrombectomy of SFA stent placed on   She had a Left fxumvvb-gi-gmopevydb bypass surgery on  during her hospital stay  Per vascular surgery and family medicine team, pt is cleared to go home on PAD prophylaxis and pain medications with outpatient f/u  On 21, HD# 8, pt remains stable and is eager to go home  She reports decreased pain and she is able to walk with no difficulty  Vital signs are stable and physical exam is normal       COMPLICATIONS:     Complications: NONE       PROCEDURES:     Procedures Performed:   No orders of the defined types were placed in this encounter  SIGNIFICANT FINDINGS / ABNORMAL RESULTS:     Significant Findings/Abnormal Results with this admission:  · Please see hospital course    Echo complete with contrast if indicated    Result Date: 9/10/2021  Narrative: [facility] [address] [place] [address] [phone] Transthoracic Echocardiogram 2D, M-mode, Doppler, and Color Doppler Study date:  10-Sep-2021 Patient: Miriam Aggarwal MR number: [id number] Account number: [de-identified] : 1985 Age: 30 years Gender: Female Status: Inpatient Location: Bedside Height: 61 in Weight: 215 lb BP: 159/ 78 mmHg Indications: Dyspnea Diagnoses: R06 00 - Dyspnea, unspecified Sonographer:  TUTU Conway Primary Physician:  Ron Edwards PA-C Group:  [id number] Cardiology Fellow: Catherine Lopez MD Interpreting Physician:  DO MK Barba LEFT VENTRICLE: Systolic function was normal  Ejection fraction was estimated to be 60 %  There were no regional wall motion abnormalities  Wall thickness was mildly increased  There was mild concentric hypertrophy   Left ventricular diastolic function parameters were normal  RIGHT VENTRICLE: The size was normal  Systolic function was normal  HISTORY: PRIOR HISTORY: Hypertension,DM2,COPD,COVID-19,Current Smoker  PROCEDURE: The procedure was performed at the bedside  This was a routine study  The transthoracic approach was used  The study included complete 2D imaging, M-mode, complete spectral Doppler, and color Doppler  The heart rate was 74 bpm, at the start of the study  Images were obtained from the parasternal, apical, subcostal, and suprasternal notch acoustic windows  Echocardiographic views were limited due to lung interference  Image quality was adequate  LEFT VENTRICLE: Size was normal  Systolic function was normal  Ejection fraction was estimated to be 60 %  There were no regional wall motion abnormalities  Wall thickness was mildly increased  There was mild concentric hypertrophy  DOPPLER: Left ventricular diastolic function parameters were normal  RIGHT VENTRICLE: The size was normal  Systolic function was normal  Wall thickness was normal  LEFT ATRIUM: Size was normal  RIGHT ATRIUM: Size was normal  MITRAL VALVE: Valve structure was normal  There was normal leaflet separation  DOPPLER: The transmitral velocity was within the normal range  There was no evidence for stenosis  There was trace regurgitation  AORTIC VALVE: The valve was trileaflet  Leaflets exhibited normal thickness and normal cuspal separation  DOPPLER: Transaortic velocity was within the normal range  There was no evidence for stenosis  There was no significant regurgitation  TRICUSPID VALVE: The valve structure was normal  There was normal leaflet separation  DOPPLER: The transtricuspid velocity was within the normal range  There was no evidence for stenosis  There was trace regurgitation  Pulmonary artery systolic pressure was within the normal range  Estimated peak PA pressure was 25 mmHg  PULMONIC VALVE: Leaflets exhibited normal thickness, no calcification, and normal cuspal separation   DOPPLER: The transpulmonic velocity was within the normal range  There was trace regurgitation  PERICARDIUM: There was no pericardial effusion  The pericardium was normal in appearance  AORTA: The root exhibited normal size  SYSTEMIC VEINS: IVC: The inferior vena cava was normal in size  Respirophasic changes were normal  SYSTEM MEASUREMENT TABLES 2D %FS: 37 55 % Ao Diam: 2 34 cm Ao asc: 2 73 cm EDV(Teich): 107 86 ml EF(Teich): 67 51 % ESV(Teich): 35 05 ml IVSd: 1 06 cm LA Area: 20 31 cm2 LA Diam: 3 57 cm LVEDV MOD A4C: 84 22 ml LVEF MOD A4C: 49 84 % LVESV MOD A4C: 42 24 ml LVIDd: 4 81 cm LVIDs: 3 cm LVLd A4C: 7 65 cm LVLs A4C: 6 44 cm LVPWd: 1 cm RA Area: 15 96 cm2 RVIDd: 3 31 cm SV MOD A4C: 41 98 ml SV(Teich): 72 82 ml CW TR Vmax: 2 22 m/s TR maxP 74 mmHg MM TAPSE: 2 08 cm PW E' Sept: 0 12 m/s E/E' Sept: 7 65 MV A Kevin: 0 44 m/s MV Dec Lea: 5 67 m/s2 MV DecT: 164 04 ms MV E Kevin: 0 93 m/s MV E/A Ratio: 2 09 MV PHT: 47 57 ms MVA By PHT: 4 62 cm2 [facility] Commission Accredited Echocardiography Laboratory Prepared and electronically signed by Yasmeen Rainey DO Signed 10-Sep-2021 12:31:11     X-ray chest 1 view portable    Result Date: 2021  Narrative: CHEST INDICATION:   chest pain  COMPARISON:  Chest x-ray 2020 EXAM PERFORMED/VIEWS:  XR CHEST PORTABLE  Image: 1 FINDINGS:  Cardiomediastinal silhouette appears stable  The lungs are clear  No pneumothorax or pleural effusion  Osseous structures appear within normal limits for patient age  Impression: No acute cardiopulmonary disease  Workstation performed: [id number]     XR knee 1 or 2 vw left    Result Date: 2021  Narrative: LEFT KNEE INDICATION:   Left knee pain  COMPARISON: Radiographs of left tibia and fibula 2017 CT left lower extremity 2021 VIEWS:  XR KNEE 1 OR 2 VW LEFT FINDINGS: There is no acute fracture or dislocation   0 9 cm calcific fragment along the medial femoral condyle is not significantly changed since the 2017 radiograph, possible sequelae of remote injury Productive enthesopathic changes along the superior patellar pole There is no joint effusion  No significant degenerative changes  No lytic or blastic osseous lesion  Partially imaged vascular stent within the posteromedial soft tissues of the thigh Soft tissue swelling is seen to advantage on the August 22, 2021 CT  Impression: No acute osseous abnormality  Workstation performed: [id number]     IR lower extremity angiogram    Result Date: 8/25/2021  Narrative: PROCEDURE: Lower extremity angiography and interventions Procedural Personnel Attending physician(s): Dr Lazaro Han Pre-procedure diagnosis: Atherosclerosis of native arteries of left leg Post-procedure diagnosis: Same Indication: Patient with history of left SFA occlusion underwent left SFA stenting 2 weeks ago  Patient returns with thrombosed stent  Complications: No immediate complications  Impression: 1  Successful recanalization of completely occluded left SFA stent  2  Thrombectomy of the occluded stent performed using TPA PulseSpray thrombolysis, AngioJet thrombectomy, Penumbra thrombectomy  3  There remained chronic-appearing stenoses at the proximal and distal margins of the stent which were successfully angioplastied using 6 mm balloon  4  There appeared to be chronic stenoses immediately distal to the stent  The left SFA stent was extended distally into the proximal popliteal artery using 6 mm x 120 mm Viola stent  5  There remained dominant flow into the left foot through the posterior tibial artery  Peroneal artery appeared to be diminutive  Anterior tibial artery appeared chronically occluded   Plan: - Flat bed rest for 2 hours postprocedure - Monitor for improvement in left lower extremity ischemic symptoms _______________________________________________________________ PROCEDURE SUMMARY: - Arterial access with ultrasound guidance - Left lower extremity angiography - Thrombolysis and thrombectomy of occluded left SFA stent - Distal left SFA and proximal left popliteal artery stenting PROCEDURE DETAILS: Pre-procedure Consent: Informed consent for the procedure including risks, benefits and alternatives was obtained and time-out was performed prior to the procedure  Preparation: The site was prepared and draped using maximal sterile barrier technique including cutaneous antisepsis  Anesthesia/sedation Level of anesthesia/sedation: General anesthesia Anesthesia/sedation administered by: Anesthesiology Total intra-service sedation time (minutes): 180 Access Local anesthesia was administered  The vessel was sonographically evaluated and judged to be patent  Real time ultrasound was used to visualize needle entry into the vessel and a permanent image was stored  A 5 Thai sheath was placed  Vessel accessed: Right common femoral artery Access technique: Micropuncture set with 21 gauge needle Left lower extremity angiography and interventions Omni Flush catheter was used to advanced across the aortic bifurcation from the right CFA access into the left CFA  Left lower extremity arterial runoff was performed  The 5 Thai sheath in the right CFA was exchanged for a 6 Thai 45 cm sheath which was advanced into the left CFA  Patient was systemically heparinized  PulseSpray TPA thrombolysis was performed throughout the occluded left SFA stent using AngioJet device  This was followed by AngioJet thrombectomy throughout the left SFA stent  Residual clots were noted within the distal portions of the stent and into the proximal popliteal artery  Additional suction thrombectomy was performed using Penumbra Cat 6 device  There was residual chronic appearing stenoses at the proximal and distal margins of the stent  Angioplasty was performed at these areas using 6 mm conventional angioplasty balloons  There appeared to be residual chronic clot in the distal SFA/proximal popliteal artery   Decision was made to extend the stent in the left SFA distally  Viola 6 mm x 120 mm stent was deployed extending from the distal SFA into the proximal popliteal artery  The newly placed stent was postdilated using 6 mm balloon at the proximal end  Post stent placement angiogram showed widely patent stents in the left SFA  Runoff angiogram into the left foot was performed to assess for distal emboli  Closure Access site angiography performed: Yes Findings: Patent vessel with appropriate access level Arterial closure technique: Perclose Hemostasis achieved from closure technique: Yes Contrast Contrast agent: Visipaque Contrast volume (mL): 150 Radiation Dose Fluoroscopy time (minutes): 52 3  Reference air kerma (mGy): 199 Additional Details Estimated blood loss (mL): 350 Attestation Signer name: Roberth Mccormack I attest that I was present for the entire procedure  I reviewed the stored images and agree with the report as written  Workstation performed: [id number]     Stress strip    Result Date: 9/14/2021  Narrative: Confirmed by Tony uLgo ([id number]),  Eleazar Tidwell ([id number]) on 9/14/2021 3:15:23 PM    VAS lower limb arterial duplex, limited, unilateral    Result Date: 9/8/2021  Narrative:  [facility] REPORT CLINICAL: Indications:  Patient presents with constant left leg pain  Denies any open wounds or ulcers at this time  Patient s/p left proximal to distal SFA stent on 08-11-21  Operative History: 2021-08-11 Left prox to distal SFA stent Risk Factors The patient has history of HTN and Diabetes (IDDM)  Clinical Right Pressure: IV site  , Left Pressure:  149/ mm Hg  FINDINGS:  Segment                Right      Left                                          Waveform   Waveform    PSV (cm/s)  EDV  Post  Tibial           Triphasic  Monophasic                   Ant   Tibial            Triphasic  Monophasic                   Common Femoral Artery                                 68   14  Prox Profunda                                         87   13 Proximal Pop                                          29   15  Distal Pop                                            20   10  Dist Post Tibial                                      27   15  Dist  Ant  Tibial                                     13    8  Dist Peroneal                                         16    8     CONCLUSION:  Impression: RIGHT LOWER LIMB: There is no evidence of lower extremity arterial occlusive disease in this resting evaluation  Ankle/Brachial Index: 1 2 which is normal range  Prior was not obtained  PVR/ PPG tracings are normal  Metatarsal pressure of 169 mmHg Great toe pressure of 142 mmHg,  within the healing range  LEFT LOWER LIMB: Evaluation shows an occlusion vs high grade stenosis of the proximal to distal superficial femoral artery stent with reconstitution of the proximal popliteal artery  Ankle/Brachial Index:  0 41 which is in the ischemic range  Prior not obtained  PVR tracings are dampened  PPG signals could not be obtained  Metatarsal pressure of 0 mmHg  Prior not obtained  Great toe pressure of 0  mmHg,  within the healing range  Prior not obtained  In comparison to the study dated 08-22-21 there is no significant interval change in the disease process  Technical findings given to [address] [facility] [address]: Electronically Signed by: Giselle Duarte on 2021-09-08 01:34:37 PM    VAS lower limb arterial duplex, limited, unilateral    Result Date: 8/24/2021  Narrative:  [facility] REPORT CLINICAL: Indications:  Patient presents with pain in her left leg with knee pain  Denies any open wounds or ulcers at this time  S/P SFA stent on 8/11/2021  Operative History: No cardiovascular surgeries Risk Factors The patient has history of HTN and Diabetes (IDDM)    FINDINGS:  Left                   Impression  PSV (cm/s)  Common Femoral Artery                      58  Prox Profunda                              80  Prox SFA - Stent       Occluded                Mid SFA - Stent Occluded                Dist SFA - Stent       Occluded                Proximal Pop           50-75%             138  Distal Pop                                 25  Tibioperoneal                              19  Dist Post Tibial                           29  Prox  Ant  Tibial                           8  Dist  Ant  Tibial      Occluded             0  Dist Peroneal                              19     CONCLUSION: Impression: RIGHT LOWER LIMB: Left leg only with no NIKKI's  LEFT LOWER LIMB: Superficial femoral artery stent is occluded throughout with recanalization at the proximal popliteal artery with a 50-75% stenosis  Anterior tibial artery distally appears occluded  The superficial femoral artery stents are occluded  Technical findings given to Dr Melvina Cordero 17:55  SIGNATURE: Electronically Signed by: Alejandra Brandon MD on 2021-08-24 02:18:31 PM    VAS lower limb vein mapping bypass graft    Result Date: 9/9/2021  Narrative:  [facility] REPORT CLINICAL: Indications: Pre-op Vein Mapping for Future Lower Extremity Bypass Graft Operative History: 2021-08-11 Left prox to distal SFA stent Risk Factors The patient has history of HTN and Diabetes (IDDM)  FINDINGS:  Segment         Right        Left                            Diameter AP  Diameter AP  GSV Inguinal            7 1          6 7  GSV Prox Thigh          4 3          4 7  GSV Mid Thigh           4 3          4 3  GSV Dist Thigh          3 6          4 1  GSV Knee                4 2          3 7  GSV Prox Calf           2 4          3 1  GSV Mid Calf            2 5          2 7  GSV Dist Calf           2 8          3 2  GSV Ankle               3 6          3 5     CONCLUSION:  Impression: RIGHT LOWER LIMB: The great saphenous vein is patent  from the groin to the ankle  The vein measures >2mm in caliber from the groin to the ankle  LEFT LOWER LIMB: The great saphenous vein is patent  from the groin to the ankle   The vein measures >2mm in caliber from the groin to the ankle  SIGNATURE: Electronically Signed by: Inés Trevino MD on 2021-09-09 03:25:33 PM    VAS lower limb venous duplex study, unilateral/limited    Result Date: 8/23/2021  Narrative:  [facility] REPORT CLINICAL: Indications: Patient presents with left lower extremity pain x 7 days  Operative History: No cardiovascular surgeries Risk Factors The patient has history of HTN and Diabetes (IDDM)  FINDINGS:  Left     Impression       CFV      Normal (Patent)     CONCLUSION: Impression: RIGHT LOWER LIMB LIMITED: Evaluation shows no evidence of thrombus in the common femoral vein  Doppler evaluation shows a normal response to augmentation maneuvers  LEFT LOWER LIMB: No evidence of acute or chronic deep vein thrombosis No evidence of superficial thrombophlebitis noted  Doppler evaluation shows a normal response to augmentation maneuvers  Popliteal, posterior tibial and anterior tibial arterial Doppler waveforms are monophasic  Technical findings were given to Dr Mansi Marinelli 17:20  SIGNATURE: Electronically Signed by: Ankita Chavez MD, [address] on 2021-08-23 40:55:78 PM    CT lower extremity w contrast left    Result Date: 8/23/2021  Narrative: CT left lower extremity with IV contrast INDICATION: left knee pain with swelling and drainage, occluded sfa stent  COMPARISON: None  TECHNIQUE: CT examination of the left lower extremity was performed  This examination, like all CT scans performed in the [facility], was performed utilizing techniques to minimize radiation dose exposure, including the use of iterative  reconstruction and automated exposure control software  Sagittal and coronal two dimensional reconstructed images were also submitted for interpretation  IV Contrast: 100 mL of iohexol (OMNIPAQUE)  was administered intravenously without immediate adverse reaction  Rad dose  1238 98 mGy-cm FINDINGS: OSSEOUS STRUCTURES:  No fracture, dislocation or destructive osseous lesion  VISUALIZED MUSCULATURE:  Unremarkable  SOFT TISSUES:  Skin thickening overlying the prepatellar soft tissues which demonstrates underlying focal subcutaneous stranding (series 2, image 191)  No discrete fluid collection to suggest drainable abscess  No subcutaneous emphysema  The patellar tendon is not thickened  No suprapatellar joint effusion  OTHER PERTINENT FINDINGS:  There is an occluded left superficial femoral artery stent  A small amount of nonocclusive thrombus is also seen within the distal superficial femoral artery distal to the stent (series 2, image 147)  Although the study is not tailored to evaluate the arterial system, the popliteal, posterior tibial, and peroneal arteries are patent  The anterior tibial artery does not opacify past the mid calf  Impression: 1  Skin thickening overlying the prepatellar soft tissues which demonstrates underlying focal subcutaneous stranding which may be due to cellulitis or early prepatellar bursitis  No evidence of discrete fluid collection to suggest drainable abscess  No evidence of knee joint effusion  Workstation performed: [id number]     NM myocardial perfusion spect (rx stress and/or rest)    Result Date: 9/10/2021  Narrative: [facility] [address] [place] [address] [phone] Rest/Stress Gated SPECT Myocardial Perfusion Imaging After Regadenoson Patient: Emely Marion MR number: [id number] Account number: [de-identified] : 1985 Age: 30 years Gender: Female Status: Inpatient Location: Stress lab Height: 61 in Weight: 215 lb BP: 138/ 78 mmHg Allergies: NO KNOWN ALLERGIES Diagnosis: R06 00 - Dyspnea, unspecified, V72 81 - PREOP CARDIOVSCLR EXAM Interpreting Physician:  Rc Alicia DO Referring Physician:  Saad Price PA-C Primary Physician:  Shannon Leger RN:  Tawny Fitzgerald RN BSN Group:  [facility] Cardiology Fellow: Sebas Umaña MD Report Prepared by[de-identified] Arielle Garza RN BSN RN:  Johnnie Lora RN INDICATIONS: Pre-operative risk assessment  HISTORY: The patient is a 30year old female  Chest pain status: chest pain, associated with dyspnea  Coronary artery disease risk factors: dyslipidemia, hypertension, smoking, and diabetes mellitus  Cardiovascular history: peripheral vascular occlusive disease  Co-morbidity: history of cocaine abuse, history of lung disease, and obesity  Medications: an ACE inhibitor/ARB, clopidogrel, a lipid lowering agent, and diabetic medications  PHYSICAL EXAM: Baseline physical exam screening: no evidence of significant aortic stenosis and no wheezes audible  REST ECG: Normal sinus rhythm  Normal baseline ECG  PROCEDURE: The study was performed in the the Stress lab  A regadenoson infusion pharmacologic stress test was performed  Gated SPECT myocardial perfusion imaging was performed after stress  Systolic blood pressure was 138 mmHg, at the start of the study  Diastolic blood pressure was 78 mmHg, at the start of the study  The heart rate was 90 bpm, at the start of the study  IV double checked  Regadenoson protocol: HR bpm SBP mmHg DBP mmHg Symptoms Baseline 90 138 78 none Immediate 123 132 70 moderate dyspnea 1 min 110 158 88 none 2 min 102 138 82 none No medications or fluids given  STRESS SUMMARY: Duration of pharmacologic stress was 3 min  Functional capacity could not be adequately assessed  Maximal heart rate during stress was 123 bpm  The rate-pressure product for the peak heart rate and blood pressure was 27821  There was no chest pain during stress  The stress test was terminated due to protocol completion  Pre oxygen saturation: 98 %  Peak oxygen saturation: 99 %  There were no stress arrhythmias or conduction abnormalities  The stress ECG was non-diagnostic   ISOTOPE ADMINISTRATION: Resting isotope administration Stress isotope administration Agent Tetrofosmin Tetrofosmin Dose 10 2 mCi 30 mCi Date 09/09/2021 09/10/2021 Injection time 15:05 14:01 Imaging time 15:36 15:13 Injection-image interval 31 min 72 min The radiopharmaceutical was injected at the peak effect of pharmacologic stress  MYOCARDIAL PERFUSION IMAGING: The image quality was good  Rotating projection images reveal mild breast attenuation and mild patient motion  Left ventricular size was normal  The TID ratio was 0 82  PERFUSION DEFECTS: -  There was a small, moderately severe, reversible myocardial perfusion defect of the basal to mid anterior wall  GATED SPECT: The calculated left ventricular ejection fraction was 50 %  Left ventricular ejection fraction was within normal limits by visual estimate  There was no diagnostic evidence for left ventricular regional abnormality  SUMMARY: -  Stress results: There was no chest pain during stress  -  ECG conclusions: The stress ECG was non-diagnostic  -  Perfusion imaging: There was a small, moderately severe, reversible myocardial perfusion defect of the basal to mid anterior wall  -  Gated SPECT: The calculated left ventricular ejection fraction was 50 %  Left ventricular ejection fraction was within normal limits by visual estimate  There was no diagnostic evidence for left ventricular regional abnormality  IMPRESSIONS: Abnormal study after pharmacologic vasodilation without reproduction of symptoms  There was a small amount of ischemia in the distribution of the left anterior descending coronary artery   Left ventricular systolic function was normal  Prepared and signed by Nathan Mariano DO Signed 09/10/2021 16:51:53     VITALS ON DISCHARGE DATE:     Vitals  Blood Pressure: 133/63 (09/17/21 1248)  Temperature: 98 1 °F (36 7 °C) (09/17/21 1248)  Temp Source: Oral (09/17/21 0312)  Pulse: 60 (09/17/21 0816)  Respirations: 18 (09/17/21 0312)  SpO2: 99 % (09/17/21 1248)  Height: 5' 1" (154 9 cm) (09/12/21 1046)  Weight - Scale: 97 5 kg (214 lb 15 2 oz) (09/12/21 1043)    Temp:  [97 8 °F (36 6 °C)-98 9 °F (37 2 °C)] 98 1 °F (36 7 °C)  HR:  [] 60  Resp:  [18] 18  BP: ()/(43-88) 133/63    Weight (last 2 days)     None            Intake/Output Summary (Last 24 hours) at 9/17/2021 1539  Last data filed at 9/16/2021 2101  Gross per 24 hour   Intake --   Output 200 ml   Net -200 ml       Invasive Devices     None                   PHYSICAL EXAM ON DAY OF DISCHARGE:       Physical Exam:     General: Pt observed lying comfortably in bed, NAD  Not toxic/ill-appearing  No cachectic or diaphoresis  No obvious sign of trauma or bleeding  Psych: AAOx4, able to converse appropriately  Neuro: no gross neurological deficits, CN 2-12 grossly intact  Head: atraumatic, normocephalic  Eyes: open spontaneously, EOM intact, DAMON, conjunctiva non-injected, no scleral icterus, no discharge  Ear: normal external ear, no visible drainage at external auditory orifice  Nose: clear, no epistaxis, no rhinorrhea  Throat: clear, no hoarse voice, no cough  Neck: supple, normal ROM  Heart: RRR, no murmur/distant heart sound appreciated  Lungs: LCTABL, nml respiratory effort, no agonal/labored breathing, no accessory muscle use  Abdomen: soft, nontender, nondistended, normal bowel sound  Extremities: +4 strengths b/l  Strong radial/pedal pulses  No weakness/paresthesia/edema  CONDITION AT DISCHARGE:   On day of discharge patient is seen and evaluated at bedside  Patient is stable and without concern  Patient denies any pain or SOB  Patient able to tolerate PO food without N/V/D and had bowel movement and baseline urine output  Patient able to ambulate independently without assistance  Patient is aware of current health status and understand plan of treatment and outpatient follow-up  The patient understood and agreed with the plan  All pertinent lab results, imaging studies, procedures, and/or any incidental findings have been disclosed to the patient  All pertinent questions are answered to patient's satisfaction   On day of discharge, the patient was hemodynamically stable and appropriate for discharge home  Condition at Discharge: stable       DISCHARGE MEDICATIONS:     Discharge Medications:  See after visit summary (AVS) for detailed reconciled discharge medications, which was provided to patient and family  Summary of medication changes made with this admission:    · START:  1  Tylenol 975 mg prn q 6 hrs for mild pain  2  Flexeril 5 mg TID  3  Dilaudid 2 mg prn q 6 hrs for 5 days for severe pain  4  Xarelto 2 5 mg BID  5  Aspirin 81 mg daily  6  Neurontin 300 mg TID    · STOP:  1  Plavix 75 mg    · CHANGE:  1  Xarelto 15 mg to Xarelto 2 5 mg BID    · RESUME:  1  All other home medications       FOLLOW-UP APPOINTMENTS / INSTRUCTION :     Important Physician Related Follow Up:     Appointment confirmed:  Future Appointments   Date Time Provider Jacque Weiss   9/27/2021  3:00 PM Anabelle Price MD VAS CTR EAS [facility]   9/28/2021  1:00 PM MD ELIJAH Paul [facility]   10/5/2021  8:00 PM BE SLEEP ROOM 01 BE Sleep lab BE MOB   10/7/2021  2:40 PM Lanre Fraga MD HEM ONC [facility]   11/12/2021 10:20 AM [facility]     Discharge instructions/Information to patient and family:   See after visit summary (AVS) for information provided to patient and family  Provisions for Follow-Up Care:  See after visit summary for information related to follow-up care and any pertinent home health orders  DISPOSITION:     Disposition: Home    Discharge Statement   I spent 30 minutes discharging the patient  This time was spent on the day of discharge  I had direct contact with the patient on the day of discharge  Additional documentation is required if more than 30 minutes were spent on discharge  Planned Readmission: No    The attending physician, Anton, agrees with the assessment and plan

## 2021-09-17 NOTE — CASE MANAGEMENT
Cm received TC from [address] with LUIS ARMANDO, provided information stating pt has moderate pain, has a flight of steps to her 5th floor apt and per physical therapy is limited by fatigue, pain, weakness and light-headedness  Per Johanny Smith pt will be accepted for SN

## 2021-09-17 NOTE — PROGRESS NOTES
[facility]  Progress Note - Abimael  1985, 30 y o  female MRN: [id number]  Unit/Bed#: [facility] 404-08 Encounter: [id number]  Primary Care Provider: Serina Gitelman, DO   Date and time admitted to hospital: 9/8/2021  6:48 AM    * Atherosclerosis of left leg [facility])  Assessment & Plan  H/o Left leg atherosclerosis s/p left SFA stent on 5/54, complicated by subsequent stent thrombosis and thrombectomy on 8/25  Pt presented to ED with severe left leg pain and numbness concerning for left SFA stent reocclusion most likely in the setting of Plavix noncompliance    - Pt was on home Xarelto and Plavix  - S/p bpepwxm-ey-dzivyhoiu bypass 9/14  - C/w ASA 81mg daily and Xarelto 2 5mg BID  - Analgesic: Tylenol scheduled, Ketamine @ 0 2mg/kg/hr, Dilaudid PO 2/4mg q3 for mod/sev, Dilaudid 1mg IV q3 for BTP   - Vascular surgery, IR, and APS following    Type 2 diabetes mellitus with diabetic polyneuropathy, with long-term current use of insulin (HCC)  Assessment & Plan  Uncontrolled as outpatient  - HgbA1c 8 5 on 9/8/21  - On home Lantus 40U qHS  - Pt received 10U Lispro over last 24 hr  - Increased Lantus dose to 45U last night  - Fasting BG this morning was 62 @ 6:51am, increased to 90 @ 7:20am  - Will decrease Lantus back down to 40U and focus on diet counseling   - Consider increasing sliding scale algorithm if glucose > 180        Peripheral artery disease (HCC)  Assessment & Plan  Please see A&P on "Atherosclerosis of left leg"    Bipolar disorder (HCC)  Assessment & Plan  Hypomanic mood  - On home Thorazine 100 mg q h s , Latuda 80 mg, trazodone 200 mg,  Seroquel 300 mg daily   - C/w Thorazine and Latuda  - Restarted trazodone 200 mg yesterday  - Consider restarting Quetiapine due to QTc 434-  - Closely monitor mentation      Nicotine dependence  Assessment & Plan  continue nicotine replacement   tobacco cessation counseling    COPD (chronic obstructive pulmonary disease) [facility])  Assessment & Plan   unclear history of COPD   patient currently only on albuterol neb p r n  no other active inhalers noted   will continue to monitor    Hypertension  Assessment & Plan  BP has been well controlled  - Last Blood Pressure: 118/82 (93/28/58 6830)  - Systolic (96TNA), DIV:213 , Min:104 , Max:137   - C/w Prazosin 1mg qHS  - Vitals per unit routine      Subjective:   ***    Objective:     Vitals: Blood pressure 133/63, pulse 60, temperature 98 1 °F (36 7 °C), resp  rate 18, height 5' 1" (1 549 m), weight 97 5 kg (214 lb 15 2 oz), last menstrual period 08/22/2021, SpO2 99 %, not currently breastfeeding  ,Body mass index is 40 61 kg/m²  Intake/Output Summary (Last 24 hours) at 9/17/2021 1307  Last data filed at 9/16/2021 2101  Gross per 24 hour   Intake --   Output 200 ml   Net -200 ml       Physical Exam:   Physical Exam    Invasive Devices     Peripherally Inserted Central Catheter Line            PICC Line 44/74/86 Left Basilic 8 days          Drain            External Urinary Catheter 2 days                           Lab and other studies:  {Results Review Statement:35225}   No results displayed because visit has over 200 results            Recent Results (from the past 24 hour(s))   Fingerstick Glucose (POCT)    Collection Time: 09/16/21  3:56 PM   Result Value Ref Range    POC Glucose 214 (H) 65 - 140 mg/dl   Fingerstick Glucose (POCT)    Collection Time: 09/16/21  8:49 PM   Result Value Ref Range    POC Glucose 155 (H) 65 - 140 mg/dl   CBC and Platelet    Collection Time: 09/17/21  4:53 AM   Result Value Ref Range    WBC 6 51 4 31 - 10 16 Thousand/uL    RBC 2 66 (L) 3 81 - 5 12 Million/uL    Hemoglobin 7 5 (L) 11 5 - 15 4 g/dL    Hematocrit 21 4 (L) 34 8 - 46 1 %    MCV 81 (L) 82 - 98 fL    MCH 28 2 26 8 - 34 3 pg    MCHC 35 0 31 4 - 37 4 g/dL    RDW 13 2 11 6 - 15 1 %    Platelets 372 110 - 677 Thousands/uL    MPV 10 4 8 9 - 12 7 fL   Basic metabolic panel    Collection Time: 09/17/21 4:53 AM   Result Value Ref Range    Sodium 137 136 - 145 mmol/L    Potassium 3 7 3 5 - 5 3 mmol/L    Chloride 105 100 - 108 mmol/L    CO2 28 21 - 32 mmol/L    ANION GAP 4 4 - 13 mmol/L    BUN 15 5 - 25 mg/dL    Creatinine 0 56 (L) 0 60 - 1 30 mg/dL    Glucose 61 (L) 65 - 140 mg/dL    Calcium 8 5 8 3 - 10 1 mg/dL    eGFR 141 ml/min/1 73sq m   Fingerstick Glucose (POCT)    Collection Time: 09/17/21  6:50 AM   Result Value Ref Range    POC Glucose 62 (L) 65 - 140 mg/dl   Fingerstick Glucose (POCT)    Collection Time: 09/17/21  7:17 AM   Result Value Ref Range    POC Glucose 90 65 - 140 mg/dl   Hemoglobin and hematocrit, blood    Collection Time: 09/17/21 10:33 AM   Result Value Ref Range    Hemoglobin 7 2 (L) 11 5 - 15 4 g/dL    Hematocrit 20 4 (L) 34 8 - 46 1 %   Fingerstick Glucose (POCT)    Collection Time: 09/17/21 12:00 PM   Result Value Ref Range    POC Glucose 111 65 - 140 mg/dl     Blood Culture:   Lab Results   Component Value Date    BLOODCX No Growth After 5 Days  08/22/2021    BLOODCX No Growth After 5 Days   08/22/2021   ,   Urinalysis:   Lab Results   Component Value Date    COLORU Yellow 08/24/2021    CLARITYU Clear 08/24/2021    SPECGRAV 1 017 08/24/2021    PHUR 5 5 08/24/2021    PHUR 7 5 08/07/2020    LEUKOCYTESUR Negative 08/24/2021    NITRITE Negative 08/24/2021    GLUCOSEU 500 (1/2%) (A) 08/24/2021    KETONESU Negative 08/24/2021    BILIRUBINUR Negative 08/24/2021    BLOODU Moderate (A) 08/24/2021   ,   Urine Culture: No results found for: URINECX,   Wound Culure: No results found for: WOUNDCULT      Imaging:  {Radiology Results:68466::"***"}       VTE Pharmacologic Prophylaxis: {Pharmacologic VTE Prophylaxis:257301172}  VTE Mechanical Prophylaxis: {Mechanical VTE Prophylaxis:37590}    Current Facility-Administered Medications   Medication Dose Route Frequency    acetaminophen (TYLENOL) tablet 975 mg  975 mg Oral Q6H [facility]    albuterol inhalation solution 2 5 mg  2 5 mg Nebulization Q6H PRN    aspirin chewable tablet 81 mg  81 mg Oral Daily    atorvastatin (LIPITOR) tablet 40 mg  40 mg Oral Daily With Dinner    chlorproMAZINE (THORAZINE) tablet 100 mg  100 mg Oral HS    cyclobenzaprine (FLEXERIL) tablet 5 mg  5 mg Oral TID    diphenhydrAMINE (BENADRYL) tablet 25 mg  25 mg Oral Q6H PRN    gabapentin (NEURONTIN) capsule 300 mg  300 mg Oral TID    HYDROmorphone (DILAUDID) injection 0 5 mg  0 5 mg Intravenous Q3H PRN    HYDROmorphone (DILAUDID) tablet 2 mg  2 mg Oral Q4H PRN    HYDROmorphone (DILAUDID) tablet 4 mg  4 mg Oral Q4H PRN    insulin glargine (LANTUS) subcutaneous injection 45 Units 0 45 mL  45 Units Subcutaneous HS    insulin lispro (HumaLOG) 100 units/mL subcutaneous injection 2-12 Units  2-12 Units Subcutaneous TID AC    lurasidone (LATUDA) tablet 80 mg  80 mg Oral Daily With Breakfast    nicotine (NICODERM CQ) 21 mg/24 hr TD 24 hr patch 1 patch  1 patch Transdermal Daily    ondansetron (ZOFRAN) injection 4 mg  4 mg Intravenous Q6H PRN    polyethylene glycol (MIRALAX) packet 17 g  17 g Oral Daily PRN    prazosin (MINIPRESS) capsule 1 mg  1 mg Oral HS    rivaroxaban (XARELTO) tablet 2 5 mg  2 5 mg Oral BID With Meals    senna-docusate sodium (SENOKOT S) 8 6-50 mg per tablet 2 tablet  2 tablet Oral HS    traZODone (DESYREL) tablet 200 mg  200 mg Oral HS         PPX:   Diet:  Code Status:   Dispo:     Plan D/W  *** [facility] Team      Janice Piña MD  Family Medicine Resident PGY1

## 2021-09-17 NOTE — ASSESSMENT & PLAN NOTE
unclear history of COPD, no PFT in chart   patient currently only on albuterol neb p r n  no other active inhalers noted

## 2021-09-20 ENCOUNTER — TELEPHONE (OUTPATIENT)
Dept: VASCULAR SURGERY | Facility: CLINIC | Age: 36
End: 2021-09-20

## 2021-09-20 ENCOUNTER — TELEPHONE (OUTPATIENT)
Dept: ADMINISTRATIVE | Facility: HOSPITAL | Age: 36
End: 2021-09-20

## 2021-09-20 DIAGNOSIS — I73.9 PERIPHERAL ARTERY DISEASE (HCC): Primary | ICD-10-CM

## 2021-09-20 NOTE — TELEPHONE ENCOUNTER
Spoke with EMILE Lane to discuss patient's symptoms and concerns  Provided her with information obtained from patient  She recommended to obtain a lower extremity arterial doppler as soon as possible and an office visit to assess her leg as soon as possible

## 2021-09-20 NOTE — TELEPHONE ENCOUNTER
Patient was transferred by nursing to be scheduled for a STAT EZEKIEL and OV to follow  I had patient confirm her insurance and then asked patient if she was able to go to the [facility] at 10:30am  Patient said she was a [place] resident and I said I am aware of that, but there is an appointment open at [address] [place] [address] at 10:30am tomorrow and they would like you to get the testing done as soon as possible  I said so if you could do that office that would be great, but if not I will call to see where I can get her in  She then said never mind and hung up on me  I tried to call her back a few minutes later and no one answered  I left a message for her to give us a call back so we could schedule the doppler

## 2021-09-20 NOTE — TELEPHONE ENCOUNTER
Contacted patient to inform her of recommendations from Eva Ward, Estre Cerrato  She was agreeable to same  Transferred call to [facility] in the Call Center to Schedule Left EZEKIEL and office visit as soon as possible

## 2021-09-20 NOTE — TELEPHONE ENCOUNTER
Vascular Nurse Navigator Post Op Call    Procedure: Left Common Femoral Below Knee to Popliteal Bypass with Insitu GSV graft  Left Lower Extremity Angiogram (Left)    Date of Procedure: 9/14/21    Surgeon:    Dmitri London MD - Primary     * Jacquelyn Curry, [facility] Son Leigh Odom MD - Assisting    Discharge Date: 9/17/21    Discharge Disposition: Home with [facility]    Leg Weakness?: No    Leg Swelling?: Yes    Leg Numbness?: Yes    Chest Pain?: No    Shortness of Breath?: No    Orthopnea?: No    Anticoagulation pt was discharged on post op?: Aspirin and Rivaroxaban (Xarelto)    Statin pt was discharged on post op?:  Lipitor (atorvastatin)    Bleeding?: No    Uncontrolled Pain?: Yes    Incision Concerns?: Yes    Fever or Chills?: No      Reviewed discharge instructions and incision care with patient  NEXT OFFICE VISIT SCHEDULED:  9/27/21 at 3 pm with Dr Edith [facility] Confirmed?: Yes      Any further questions/concerns? Patient stated that she is not doing well since she has been home  She stated that her toes are cold and this started on Saturday and started with the ipinky toe and then went tot he rest of the foot  She stated that she cannot feel her foot/toes, but per her wife, she is able to move her toes but she cannot feel that she is moving her toes  She stated that her leg is swollen and she has not been elevating her leg as she was not aware to elevate it  She stated that her leg is hard from her ankle up to her calf  She stated that the left side of her leg (opposite side of the staples) is numb, puffy and hard  She stated the color of her left foot is the same as her right foot  She stated that she has pain in the center of her thigh, groin and her calf  She stated that the calf area is worse  She stated there is a large lump in her groin     Her groin incision is intact, without any redness, drainage, or open areas    She stated that she has orange/yellow draiange leaking from her incisions on her legs and it is running down her leg  She feels like her staples are popping out  She rtes her pain as "100" on a pain scale of 1 to 10  Reviewed her dishcarge medications - Aspirin, Xarelto, and Lipitor  She stated that she has been taking both the 15 mg and 2 5 mg tablets of Xarelto  Informed her that she was prescribed to only be taking 2 5 mg twice a day of the Xarelto  Reviewed incision care with her - wash daily with soap and water  She stated the nurse has not been out from [address] [place] [address]  She stated the nurse called her this morning for a possible visit today, but she was unsure when the nurse would be coming  Advised her to elevate leg as much as possible to help with the swelling  Informed her that I will discuss above information with triage provider and contact her back with recommendation  She was agreeable to same

## 2021-09-21 ENCOUNTER — TELEPHONE (OUTPATIENT)
Dept: OBGYN CLINIC | Facility: CLINIC | Age: 36
End: 2021-09-21

## 2021-09-21 LAB
DME PARACHUTE DELIVERY DATE ACTUAL: NORMAL
DME PARACHUTE DELIVERY DATE REQUESTED: NORMAL
DME PARACHUTE ITEM DESCRIPTION: NORMAL
DME PARACHUTE ORDER STATUS: NORMAL
DME PARACHUTE SUPPLIER NAME: NORMAL
DME PARACHUTE SUPPLIER PHONE: NORMAL

## 2021-09-21 NOTE — TELEPHONE ENCOUNTER
Spoke to patient on the phone  She wants to have the doppler  I gave her the number for central scheduling to see if she can schedule it at [address]  I also told her that we need to see her in the office to review her medications  Her medication regimen has changed since she has been in and out of the hospital this past year, and I do not want to refill medications that she is not taking that could cause harm to the patient if refilled  She agreed to shcedule an appointment  I will have our staff call to schedule her  She agreed

## 2021-09-21 NOTE — TELEPHONE ENCOUNTER
Patient requesting to speak to you  I tried asking her what it pertained to and she just said please have my doctor call me  Please advise  Thank you

## 2021-09-21 NOTE — TELEPHONE ENCOUNTER
Attempted to call patient regarding refill request on lamictal  Also tried to discuss her scheduled doppler appointment at [place], likely to assess patency after vpybyrp-va-uxkknbmbf bypass which occurred during last hospitalization on 9/14/21  She explained to me that her insurance is not in [place], so she does not want to go there to get this imaging done  Per chart review, it looks like vascular surgery was aware of this, and this should not be an issue  However, she insisted on not going  She does have a   She hung up on me before we could conclude our conversation  Since lamictal is a mood stabilizer and I have not seen her in the office in months, it is not safe to refill without knowing whether or not she has been consistently taking this medication  With that being said, please call patient to schedule appointment to be seen in the office

## 2021-09-22 ENCOUNTER — DOCUMENTATION (OUTPATIENT)
Dept: SOCIAL WORK | Facility: HOSPITAL | Age: 36
End: 2021-09-22

## 2021-09-22 NOTE — PROGRESS NOTES
Admission Report at [facility] Scott'angel FRANCES has admitted your patient to [address] [place] [address] service with the following disciplines:      SN  This report is informational only, no responses is needed  Primary focus of home health care Incision check   DM   Patient stated goals of care to walk   Anticipated visit pattern and next visit date 1w4  Next vs 9/29  Significant clinical findings Non compliance with medications  Medications not in home:   Trulicity and Lantus  No glucometer  Pt not taking atorvastin nebulizer tx  lamictal  trazadone or using nicotine patch  Medication interactions Level 2  xarelto and diclofenc sodium  Chlorpromazine and hydroxine and quetinapine  Potential barriers to goal achievement mental status    Thank you for allowing us to participate in the care of your patient        Makenzie Sue RN BSN CWON

## 2021-09-23 ENCOUNTER — HOSPITAL ENCOUNTER (INPATIENT)
Facility: HOSPITAL | Age: 36
LOS: 1 days | Discharge: HOME HEALTH | DRG: 603 | End: 2021-09-25
Attending: EMERGENCY MEDICINE | Admitting: INTERNAL MEDICINE
Payer: MEDICARE

## 2021-09-23 ENCOUNTER — APPOINTMENT (EMERGENCY)
Dept: CT IMAGING | Facility: HOSPITAL | Age: 36
DRG: 603 | End: 2021-09-23
Payer: MEDICARE

## 2021-09-23 DIAGNOSIS — M79.672 LEFT FOOT PAIN: ICD-10-CM

## 2021-09-23 DIAGNOSIS — I70.209 SUPERFICIAL FEMORAL ARTERY OCCLUSION (HCC): ICD-10-CM

## 2021-09-23 DIAGNOSIS — F14.10 COCAINE ABUSE (HCC): ICD-10-CM

## 2021-09-23 DIAGNOSIS — L03.116 CELLULITIS OF LEFT LOWER EXTREMITY: ICD-10-CM

## 2021-09-23 DIAGNOSIS — I73.9 PVD (PERIPHERAL VASCULAR DISEASE) (HCC): Primary | ICD-10-CM

## 2021-09-23 DIAGNOSIS — I70.202 ATHEROSCLEROSIS OF LEFT LEG (HCC): ICD-10-CM

## 2021-09-23 DIAGNOSIS — M79.605 LEFT LEG PAIN: ICD-10-CM

## 2021-09-23 DIAGNOSIS — L03.90 CELLULITIS: ICD-10-CM

## 2021-09-23 LAB
ALBUMIN SERPL BCP-MCNC: 3.4 G/DL (ref 3.4–4.8)
ALP SERPL-CCNC: 86.5 U/L (ref 35–140)
ALT SERPL W P-5'-P-CCNC: 23 U/L (ref 5–54)
AMPHETAMINES SERPL QL SCN: NEGATIVE
ANION GAP SERPL CALCULATED.3IONS-SCNC: 7 MMOL/L (ref 4–13)
APTT PPP: 25 SECONDS (ref 23–31)
AST SERPL W P-5'-P-CCNC: 14 U/L (ref 15–41)
BACTERIA UR QL AUTO: NORMAL /HPF
BARBITURATES UR QL: NEGATIVE
BASOPHILS # BLD AUTO: 0.03 THOUSANDS/ΜL (ref 0–0.1)
BASOPHILS NFR BLD AUTO: 0 % (ref 0–1)
BENZODIAZ UR QL: NEGATIVE
BILIRUB SERPL-MCNC: 0.38 MG/DL (ref 0.3–1.2)
BILIRUB UR QL STRIP: NEGATIVE
BUN SERPL-MCNC: 12 MG/DL (ref 6–20)
CALCIUM ALBUM COR SERPL-MCNC: 9 MG/DL (ref 8.3–10.1)
CALCIUM SERPL-MCNC: 8.5 MG/DL (ref 8.4–10.2)
CHLORIDE SERPL-SCNC: 99 MMOL/L (ref 96–108)
CK MB SERPL-MCNC: 1.2 % (ref 0–2.5)
CK MB SERPL-MCNC: 4.7 NG/ML (ref 0.1–5.9)
CK SERPL-CCNC: 405.8 U/L (ref 38–234)
CLARITY UR: CLEAR
CO2 SERPL-SCNC: 28 MMOL/L (ref 22–33)
COCAINE UR QL: POSITIVE
COLOR UR: YELLOW
CREAT SERPL-MCNC: 0.79 MG/DL (ref 0.4–1.1)
EOSINOPHIL # BLD AUTO: 0.24 THOUSAND/ΜL (ref 0–0.61)
EOSINOPHIL NFR BLD AUTO: 2 % (ref 0–6)
ERYTHROCYTE [DISTWIDTH] IN BLOOD BY AUTOMATED COUNT: 13.1 % (ref 11.6–15.1)
GFR SERPL CREATININE-BSD FRML MDRD: 113 ML/MIN/1.73SQ M
GLUCOSE SERPL-MCNC: 265 MG/DL (ref 65–140)
GLUCOSE UR STRIP-MCNC: ABNORMAL MG/DL
HCT VFR BLD AUTO: 23.3 % (ref 34.8–46.1)
HGB BLD-MCNC: 8.1 G/DL (ref 11.5–15.4)
HGB UR QL STRIP.AUTO: ABNORMAL
IMM GRANULOCYTES # BLD AUTO: 0.03 THOUSAND/UL (ref 0–0.2)
IMM GRANULOCYTES NFR BLD AUTO: 0 % (ref 0–2)
INR PPP: 0.96 (ref 0.9–1.1)
KETONES UR STRIP-MCNC: ABNORMAL MG/DL
LACTATE SERPL-SCNC: 1.5 MMOL/L (ref 0–2)
LEUKOCYTE ESTERASE UR QL STRIP: NEGATIVE
LYMPHOCYTES # BLD AUTO: 1.92 THOUSANDS/ΜL (ref 0.6–4.47)
LYMPHOCYTES NFR BLD AUTO: 18 % (ref 14–44)
MCH RBC QN AUTO: 27.8 PG (ref 26.8–34.3)
MCHC RBC AUTO-ENTMCNC: 34.8 G/DL (ref 31.4–37.4)
MCV RBC AUTO: 80 FL (ref 82–98)
METHADONE UR QL: NEGATIVE
MONOCYTES # BLD AUTO: 0.58 THOUSAND/ΜL (ref 0.17–1.22)
MONOCYTES NFR BLD AUTO: 5 % (ref 4–12)
NEUTROPHILS # BLD AUTO: 8.07 THOUSANDS/ΜL (ref 1.85–7.62)
NEUTS SEG NFR BLD AUTO: 75 % (ref 43–75)
NITRITE UR QL STRIP: NEGATIVE
NON-SQ EPI CELLS URNS QL MICRO: NORMAL /HPF
OPIATES UR QL SCN: POSITIVE
OXYCODONE+OXYMORPHONE UR QL SCN: NEGATIVE
PCP UR QL: NEGATIVE
PH UR STRIP.AUTO: 6 [PH]
PLATELET # BLD AUTO: 430 THOUSANDS/UL (ref 149–390)
PMV BLD AUTO: 10.1 FL (ref 8.9–12.7)
POTASSIUM SERPL-SCNC: 3.7 MMOL/L (ref 3.5–5)
PROT SERPL-MCNC: 6.7 G/DL (ref 6.4–8.3)
PROT UR STRIP-MCNC: NEGATIVE MG/DL
PROTHROMBIN TIME: 10.9 SECONDS (ref 9.5–12.1)
RBC # BLD AUTO: 2.91 MILLION/UL (ref 3.81–5.12)
RBC #/AREA URNS AUTO: NORMAL /HPF
SODIUM SERPL-SCNC: 134 MMOL/L (ref 133–145)
SP GR UR STRIP.AUTO: 1.02 (ref 1–1.03)
THC UR QL: POSITIVE
UROBILINOGEN UR QL STRIP.AUTO: >=8 E.U./DL
WBC # BLD AUTO: 10.87 THOUSAND/UL (ref 4.31–10.16)
WBC #/AREA URNS AUTO: NORMAL /HPF

## 2021-09-23 PROCEDURE — 36415 COLL VENOUS BLD VENIPUNCTURE: CPT | Performed by: EMERGENCY MEDICINE

## 2021-09-23 PROCEDURE — 96374 THER/PROPH/DIAG INJ IV PUSH: CPT

## 2021-09-23 PROCEDURE — 85730 THROMBOPLASTIN TIME PARTIAL: CPT | Performed by: EMERGENCY MEDICINE

## 2021-09-23 PROCEDURE — 73706 CT ANGIO LWR EXTR W/O&W/DYE: CPT

## 2021-09-23 PROCEDURE — B41G1ZZ FLUOROSCOPY OF LEFT LOWER EXTREMITY ARTERIES USING LOW OSMOLAR CONTRAST: ICD-10-PCS | Performed by: RADIOLOGY

## 2021-09-23 PROCEDURE — 80307 DRUG TEST PRSMV CHEM ANLYZR: CPT | Performed by: EMERGENCY MEDICINE

## 2021-09-23 PROCEDURE — 82550 ASSAY OF CK (CPK): CPT | Performed by: EMERGENCY MEDICINE

## 2021-09-23 PROCEDURE — 81001 URINALYSIS AUTO W/SCOPE: CPT | Performed by: EMERGENCY MEDICINE

## 2021-09-23 PROCEDURE — 82553 CREATINE MB FRACTION: CPT | Performed by: EMERGENCY MEDICINE

## 2021-09-23 PROCEDURE — 87040 BLOOD CULTURE FOR BACTERIA: CPT | Performed by: EMERGENCY MEDICINE

## 2021-09-23 PROCEDURE — 80053 COMPREHEN METABOLIC PANEL: CPT | Performed by: EMERGENCY MEDICINE

## 2021-09-23 PROCEDURE — 99285 EMERGENCY DEPT VISIT HI MDM: CPT

## 2021-09-23 PROCEDURE — 83605 ASSAY OF LACTIC ACID: CPT | Performed by: EMERGENCY MEDICINE

## 2021-09-23 PROCEDURE — 85025 COMPLETE CBC W/AUTO DIFF WBC: CPT | Performed by: EMERGENCY MEDICINE

## 2021-09-23 PROCEDURE — 85610 PROTHROMBIN TIME: CPT | Performed by: EMERGENCY MEDICINE

## 2021-09-23 PROCEDURE — 96361 HYDRATE IV INFUSION ADD-ON: CPT

## 2021-09-23 PROCEDURE — 96375 TX/PRO/DX INJ NEW DRUG ADDON: CPT

## 2021-09-23 PROCEDURE — B41B1ZZ FLUOROSCOPY OF OTHER INTRA-ABDOMINAL ARTERIES USING LOW OSMOLAR CONTRAST: ICD-10-PCS | Performed by: RADIOLOGY

## 2021-09-23 PROCEDURE — 99285 EMERGENCY DEPT VISIT HI MDM: CPT | Performed by: EMERGENCY MEDICINE

## 2021-09-23 RX ORDER — FENTANYL CITRATE 50 UG/ML
100 INJECTION, SOLUTION INTRAMUSCULAR; INTRAVENOUS ONCE
Status: COMPLETED | OUTPATIENT
Start: 2021-09-23 | End: 2021-09-23

## 2021-09-23 RX ORDER — HYDROMORPHONE HCL/PF 1 MG/ML
1 SYRINGE (ML) INJECTION ONCE
Status: COMPLETED | OUTPATIENT
Start: 2021-09-23 | End: 2021-09-23

## 2021-09-23 RX ORDER — KETOROLAC TROMETHAMINE 30 MG/ML
15 INJECTION, SOLUTION INTRAMUSCULAR; INTRAVENOUS ONCE
Status: COMPLETED | OUTPATIENT
Start: 2021-09-23 | End: 2021-09-23

## 2021-09-23 RX ADMIN — FENTANYL CITRATE 100 MCG: 50 INJECTION, SOLUTION INTRAMUSCULAR; INTRAVENOUS at 21:28

## 2021-09-23 RX ADMIN — KETOROLAC TROMETHAMINE 15 MG: 30 INJECTION, SOLUTION INTRAMUSCULAR; INTRAVENOUS at 19:58

## 2021-09-23 RX ADMIN — IOHEXOL 100 ML: 350 INJECTION, SOLUTION INTRAVENOUS at 23:33

## 2021-09-23 RX ADMIN — IOHEXOL 20 ML: 350 INJECTION, SOLUTION INTRAVENOUS at 23:33

## 2021-09-23 RX ADMIN — HYDROMORPHONE HYDROCHLORIDE 1 MG: 1 INJECTION, SOLUTION INTRAMUSCULAR; INTRAVENOUS; SUBCUTANEOUS at 19:59

## 2021-09-23 RX ADMIN — SODIUM CHLORIDE 1000 ML: 0.9 INJECTION, SOLUTION INTRAVENOUS at 19:58

## 2021-09-24 ENCOUNTER — APPOINTMENT (INPATIENT)
Dept: VASCULAR ULTRASOUND | Facility: HOSPITAL | Age: 36
DRG: 603 | End: 2021-09-24
Payer: MEDICARE

## 2021-09-24 ENCOUNTER — APPOINTMENT (INPATIENT)
Dept: NON INVASIVE DIAGNOSTICS | Facility: CLINIC | Age: 36
DRG: 603 | End: 2021-09-24
Payer: MEDICARE

## 2021-09-24 PROBLEM — L03.116 CELLULITIS OF LEFT LOWER EXTREMITY: Status: ACTIVE | Noted: 2020-09-22

## 2021-09-24 PROBLEM — M79.604 BILATERAL LEG PAIN: Status: ACTIVE | Noted: 2021-03-17

## 2021-09-24 LAB
GLUCOSE SERPL-MCNC: 112 MG/DL (ref 65–140)
GLUCOSE SERPL-MCNC: 252 MG/DL (ref 65–140)
GLUCOSE SERPL-MCNC: 268 MG/DL (ref 65–140)

## 2021-09-24 PROCEDURE — NC001 PR NO CHARGE: Performed by: SURGERY

## 2021-09-24 PROCEDURE — 99223 1ST HOSP IP/OBS HIGH 75: CPT | Performed by: INTERNAL MEDICINE

## 2021-09-24 PROCEDURE — 99024 POSTOP FOLLOW-UP VISIT: CPT | Performed by: SURGERY

## 2021-09-24 PROCEDURE — 93971 EXTREMITY STUDY: CPT

## 2021-09-24 PROCEDURE — 93926 LOWER EXTREMITY STUDY: CPT

## 2021-09-24 PROCEDURE — 93971 EXTREMITY STUDY: CPT | Performed by: SURGERY

## 2021-09-24 PROCEDURE — 82948 REAGENT STRIP/BLOOD GLUCOSE: CPT

## 2021-09-24 PROCEDURE — 96375 TX/PRO/DX INJ NEW DRUG ADDON: CPT

## 2021-09-24 RX ORDER — OXYCODONE HYDROCHLORIDE 5 MG/1
5 TABLET ORAL EVERY 4 HOURS PRN
Status: DISCONTINUED | OUTPATIENT
Start: 2021-09-24 | End: 2021-09-24

## 2021-09-24 RX ORDER — OXYCODONE HYDROCHLORIDE 10 MG/1
10 TABLET ORAL EVERY 4 HOURS PRN
Status: DISCONTINUED | OUTPATIENT
Start: 2021-09-24 | End: 2021-09-24

## 2021-09-24 RX ORDER — HYDROXYZINE HYDROCHLORIDE 25 MG/1
50 TABLET, FILM COATED ORAL 3 TIMES DAILY
Status: DISCONTINUED | OUTPATIENT
Start: 2021-09-24 | End: 2021-09-25 | Stop reason: HOSPADM

## 2021-09-24 RX ORDER — ONDANSETRON 2 MG/ML
4 INJECTION INTRAMUSCULAR; INTRAVENOUS EVERY 4 HOURS PRN
Status: DISCONTINUED | OUTPATIENT
Start: 2021-09-24 | End: 2021-09-25 | Stop reason: HOSPADM

## 2021-09-24 RX ORDER — GABAPENTIN 300 MG/1
300 CAPSULE ORAL 3 TIMES DAILY
Status: DISCONTINUED | OUTPATIENT
Start: 2021-09-24 | End: 2021-09-25 | Stop reason: HOSPADM

## 2021-09-24 RX ORDER — ATORVASTATIN CALCIUM 40 MG/1
40 TABLET, FILM COATED ORAL
Status: DISCONTINUED | OUTPATIENT
Start: 2021-09-24 | End: 2021-09-25 | Stop reason: HOSPADM

## 2021-09-24 RX ORDER — NICOTINE 21 MG/24HR
1 PATCH, TRANSDERMAL 24 HOURS TRANSDERMAL DAILY
Status: DISCONTINUED | OUTPATIENT
Start: 2021-09-24 | End: 2021-09-25 | Stop reason: HOSPADM

## 2021-09-24 RX ORDER — CHLORPROMAZINE HYDROCHLORIDE 25 MG/1
100 TABLET, FILM COATED ORAL
Status: DISCONTINUED | OUTPATIENT
Start: 2021-09-24 | End: 2021-09-25 | Stop reason: HOSPADM

## 2021-09-24 RX ORDER — ASPIRIN 81 MG/1
81 TABLET, CHEWABLE ORAL DAILY
Status: DISCONTINUED | OUTPATIENT
Start: 2021-09-24 | End: 2021-09-25 | Stop reason: HOSPADM

## 2021-09-24 RX ORDER — PRAZOSIN HYDROCHLORIDE 1 MG/1
1 CAPSULE ORAL
Status: DISCONTINUED | OUTPATIENT
Start: 2021-09-24 | End: 2021-09-25 | Stop reason: HOSPADM

## 2021-09-24 RX ORDER — CEFAZOLIN SODIUM 2 G/50ML
2000 SOLUTION INTRAVENOUS EVERY 8 HOURS
Status: DISCONTINUED | OUTPATIENT
Start: 2021-09-24 | End: 2021-09-25 | Stop reason: HOSPADM

## 2021-09-24 RX ORDER — CYCLOBENZAPRINE HCL 10 MG
5 TABLET ORAL 3 TIMES DAILY
Status: DISCONTINUED | OUTPATIENT
Start: 2021-09-24 | End: 2021-09-25 | Stop reason: HOSPADM

## 2021-09-24 RX ORDER — MORPHINE SULFATE 4 MG/ML
4 INJECTION, SOLUTION INTRAMUSCULAR; INTRAVENOUS ONCE
Status: COMPLETED | OUTPATIENT
Start: 2021-09-24 | End: 2021-09-24

## 2021-09-24 RX ORDER — ACETAMINOPHEN 325 MG/1
975 TABLET ORAL EVERY 8 HOURS SCHEDULED
Status: DISCONTINUED | OUTPATIENT
Start: 2021-09-24 | End: 2021-09-25 | Stop reason: HOSPADM

## 2021-09-24 RX ORDER — OXYCODONE HYDROCHLORIDE 10 MG/1
10 TABLET ORAL EVERY 6 HOURS PRN
Status: DISCONTINUED | OUTPATIENT
Start: 2021-09-24 | End: 2021-09-25 | Stop reason: HOSPADM

## 2021-09-24 RX ORDER — LISINOPRIL 20 MG/1
20 TABLET ORAL DAILY
Status: DISCONTINUED | OUTPATIENT
Start: 2021-09-24 | End: 2021-09-25 | Stop reason: HOSPADM

## 2021-09-24 RX ORDER — TRAZODONE HYDROCHLORIDE 100 MG/1
200 TABLET ORAL
Status: DISCONTINUED | OUTPATIENT
Start: 2021-09-24 | End: 2021-09-25 | Stop reason: HOSPADM

## 2021-09-24 RX ORDER — INSULIN GLARGINE 100 [IU]/ML
40 INJECTION, SOLUTION SUBCUTANEOUS
Status: DISCONTINUED | OUTPATIENT
Start: 2021-09-24 | End: 2021-09-25 | Stop reason: HOSPADM

## 2021-09-24 RX ORDER — ALBUTEROL SULFATE 2.5 MG/3ML
2.5 SOLUTION RESPIRATORY (INHALATION) EVERY 6 HOURS PRN
Status: DISCONTINUED | OUTPATIENT
Start: 2021-09-24 | End: 2021-09-25 | Stop reason: HOSPADM

## 2021-09-24 RX ORDER — OXYCODONE HYDROCHLORIDE 5 MG/1
5 TABLET ORAL EVERY 6 HOURS PRN
Status: DISCONTINUED | OUTPATIENT
Start: 2021-09-24 | End: 2021-09-25 | Stop reason: HOSPADM

## 2021-09-24 RX ORDER — QUETIAPINE FUMARATE 100 MG/1
300 TABLET, FILM COATED ORAL DAILY
Status: DISCONTINUED | OUTPATIENT
Start: 2021-09-24 | End: 2021-09-25 | Stop reason: HOSPADM

## 2021-09-24 RX ADMIN — LURASIDONE HYDROCHLORIDE 80 MG: 80 TABLET, FILM COATED ORAL at 08:30

## 2021-09-24 RX ADMIN — NICOTINE 1 PATCH: 14 PATCH, EXTENDED RELEASE TRANSDERMAL at 09:50

## 2021-09-24 RX ADMIN — ASPIRIN 81 MG: 81 TABLET, CHEWABLE ORAL at 09:49

## 2021-09-24 RX ADMIN — CYCLOBENZAPRINE HYDROCHLORIDE 5 MG: 10 TABLET, FILM COATED ORAL at 22:33

## 2021-09-24 RX ADMIN — CEFAZOLIN SODIUM 2000 MG: 2 SOLUTION INTRAVENOUS at 22:33

## 2021-09-24 RX ADMIN — MORPHINE SULFATE 2 MG: 2 INJECTION, SOLUTION INTRAMUSCULAR; INTRAVENOUS at 11:10

## 2021-09-24 RX ADMIN — CEFAZOLIN SODIUM 2000 MG: 2 SOLUTION INTRAVENOUS at 03:33

## 2021-09-24 RX ADMIN — HYDROXYZINE HYDROCHLORIDE 50 MG: 25 TABLET ORAL at 22:34

## 2021-09-24 RX ADMIN — RIVAROXABAN 2.5 MG: 2.5 TABLET, FILM COATED ORAL at 08:30

## 2021-09-24 RX ADMIN — RIVAROXABAN 2.5 MG: 2.5 TABLET, FILM COATED ORAL at 22:32

## 2021-09-24 RX ADMIN — PHENYTOIN 1 MG: 125 SUSPENSION ORAL at 22:39

## 2021-09-24 RX ADMIN — OXYCODONE HYDROCHLORIDE 5 MG: 5 TABLET ORAL at 09:48

## 2021-09-24 RX ADMIN — QUETIAPINE FUMARATE 300 MG: 100 TABLET ORAL at 09:49

## 2021-09-24 RX ADMIN — MORPHINE SULFATE 2 MG: 2 INJECTION, SOLUTION INTRAMUSCULAR; INTRAVENOUS at 05:06

## 2021-09-24 RX ADMIN — TRAZODONE HYDROCHLORIDE 200 MG: 100 TABLET ORAL at 22:32

## 2021-09-24 RX ADMIN — ACETAMINOPHEN 975 MG: 325 TABLET, FILM COATED ORAL at 22:29

## 2021-09-24 RX ADMIN — MORPHINE SULFATE 4 MG: 4 INJECTION INTRAVENOUS at 01:41

## 2021-09-24 RX ADMIN — INSULIN GLARGINE 40 UNITS: 100 INJECTION, SOLUTION SUBCUTANEOUS at 22:35

## 2021-09-24 RX ADMIN — HYDROXYZINE HYDROCHLORIDE 50 MG: 25 TABLET ORAL at 09:48

## 2021-09-24 RX ADMIN — GABAPENTIN 300 MG: 300 CAPSULE ORAL at 09:49

## 2021-09-24 RX ADMIN — CEFAZOLIN SODIUM 2000 MG: 2 SOLUTION INTRAVENOUS at 11:14

## 2021-09-24 RX ADMIN — INSULIN LISPRO 3 UNITS: 100 INJECTION, SOLUTION INTRAVENOUS; SUBCUTANEOUS at 07:38

## 2021-09-24 RX ADMIN — CHLORPROMAZINE HYDROCHLORIDE 100 MG: 25 TABLET, SUGAR COATED ORAL at 22:34

## 2021-09-24 RX ADMIN — GABAPENTIN 300 MG: 300 CAPSULE ORAL at 22:34

## 2021-09-24 RX ADMIN — LISINOPRIL 20 MG: 20 TABLET ORAL at 09:50

## 2021-09-24 RX ADMIN — CYCLOBENZAPRINE HYDROCHLORIDE 5 MG: 10 TABLET, FILM COATED ORAL at 09:50

## 2021-09-24 NOTE — ED NOTES
Pt ate two lunch box meals, multiple sodas/juice, and asking for more food/soda  Pt ringing call bell repeatedly for pain meds and food         Eder Fournier, JEN  09/24/21 0204

## 2021-09-24 NOTE — CONSULTS
Consultation - Vascular Surgery   Chelsie Hernández 30 y o  female MRN: [id number]  Unit/Bed#: ED 13 Encounter: [id number]      Assessment/Plan      Assessment:  Presents several weeks status post left femoral to below-knee popliteal bypass for ischemic rest pain left foot  Plan:  Left leg bypass widely patent excellent perfusion left foot, patient having difficulty moving her left leg especially lifting her thigh possibly due to femoral nerve inflammation, no evidence of collection on CT or ultrasound, superficial wound infection possible on IV antibiotics would recommend warm moist soaks and consult to physical therapy and and may possibly need rehab stay  History of Present Illness   Physician Requesting Consult: Amauri Alanis MD  Reason for Consult / Principal Problem:  Week and half status post left leg bypass patient having left leg pain and greatly reduced mobility left leg  History, ROS and PFSH unobtainable from any source due to   HPI: Chelsie Hernández is a 30y o  year old female who presents with recent left leg bypass femoral to below-knee popliteal saphenous vein presents with left leg pain and decreased mobility, minimal exudate from upper thigh wound no evidence of deep collection at this time  Bedside lower extremity arterial study showing widely patent bypass with excellent perfusion of her left foot and easily palpable dorsalis pedis on the right where she complains of some numbness possibly due to neurogenic lower back issues      Consults    Review of Systems    Historical Information   Past Medical History:   Diagnosis Date    Asthma     Bipolar 1 disorder [facility]     COPD [facility]chronic obstructive pulmonary disease) [facility]     Depression     [facility]     Hypertension     Psychiatric disorder     cutting history    PTSD (post-traumatic stress disorder)     Tendonitis      Past Surgical History:   Procedure Laterality Date    BYPASS FEMORAL-POPLITEAL Left 9/14/2021 Procedure: Left Common Femoral Below Knee to Popliteal Bypass with Insitu GSV graft  Left Lower Extremity Angiogram;  Surgeon: Mishel Hankins MD;  Location: [facility];  Service: Vascular     SECTION      EAR SURGERY      IR LOWER EXTREMITY ANGIOGRAM  2021    IR LOWER EXTREMITY ANGIOGRAM  2021     Social History   Social History     Substance and Sexual Activity   Alcohol Use Not Currently    Alcohol/week: 1 0 standard drinks    Types: 1 Shots of liquor per week    Comment: 'I would drink whatever "     Social History     Substance and Sexual Activity   Drug Use Not Currently    Types: Cocaine, Marijuana    Comment: 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     Social History     Tobacco Use   Smoking Status Current Every Day Smoker    Packs/day: 2 00    Years: 17 00    Pack years: 34 00    Types: Cigarettes   Smokeless Tobacco Never Used   Tobacco Comment    pt is down to 4 cigarettes a day      Family History: non-contributory}    Meds/Allergies   all current active meds have been reviewed  No Known Allergies    Objective   Vitals: Blood pressure 115/64, pulse 91, temperature 98 1 °F (36 7 °C), temperature source Oral, resp  rate 20, height 5' 1" (1 549 m), weight 97 1 kg (214 lb), SpO2 99 %, not currently breastfeeding  ,Body mass index is 40 43 kg/m²  Intake/Output Summary (Last 24 hours) at 2021 0905  Last data filed at 2021 0419  Gross per 24 hour   Intake 1050 ml   Output --   Net 1050 ml     Invasive Devices     Peripheral Intravenous Line            Peripheral IV 21 Left Antecubital <1 day                Physical Exam examination left leg intact suture lines with staples, biphasic sounds throughout the left leg bypass with biphasic sounds at the posterior tibial and dorsalis pedis    Minimal exudate in the upper thigh wound, unable to raise thigh off of the bed in the supine position able to of wiggle toes, on the right easily palpable dorsalis pedis pulse with normal mobility  Complains of mild numbness in the foot likely neurogenic  Lab Results: I have personally reviewed pertinent reports  Imaging Studies: I have personally reviewed pertinent reports  EKG, Pathology, and Other Studies: I have personally reviewed pertinent reports  VTE Prophylaxis: Sequential compression device (Venodyne)  right leg only     Code Status: Level 1 - Full Code  Advance Directive and Living Will:      Power of :    POLST:      Counseling / Coordination of Care  Counseling/Coordination of Care: Total floor / unit time spent today Twenty minutes  Greater than 50% of total time was spent with the patient and / or family counseling and / or coordination of care   A description of the counseling / coordination of care: Bedside

## 2021-09-24 NOTE — ED PROVIDER NOTES
History  Chief Complaint   Patient presents with    Leg Pain     brought in by EMS, had angioplasty on left leg sept 9th, now complaining of left leg pain and numbness in right leg     This is a 30year old obese female that presented to the ED this evening via EMS  She reports increased pain and swelling of her left leg from her baseline and reports that she feels like her right foot "is cold inside"    She had a left fem-pop graft at the [facility] on 9/14 - she had 2 prior arteriograms with stent placement    She reports that she has had increasing pain and edema of the left surgical leg for the past several days - she is scheduled for a arterial duplex doppler tomorrow  Reports that she takes Dilaudid for the pain at home and is not having any relief from the pain    Denies fever or chills  No identified aggravating or alleviating factors  Left DP/PT pulses present with doppler - right PT DP pulses present per palpation          Prior to Admission Medications   Prescriptions Last Dose Informant Patient Reported? Taking?    BD Pen Needle Micro U/F 32G X 6 MM MISC  Self No No   Sig: use 1 NEEDLE to inject MEDICATION subcutaneously twice a day   Blood Glucose Monitoring Suppl (True Metrix Air Glucose Meter) w/Device KIT  Self No No   Sig: use as directed   Blood Pressure Monitoring (Blood Pressure Monitor Automat) KATLYN  Self No No   Sig: Use Daily as Directed   Diclofenac Sodium (VOLTAREN) 1 %   No No   Sig: APPLY 2 G TOPICALLY 4 (FOUR) TIMES A DAY   Dulaglutide (Trulicity) 1 5 TN/6 9YQ SOPN   No No   Sig: Tony Roberto   HYDROmorphone (DILAUDID) 2 mg tablet   No No   Sig: Take 1 tablet (2 mg total) by mouth every 6 (six) hours as needed for severe pain for up to 10 daysMax Daily Amount: 8 mg   QUEtiapine (SEROquel) 300 mg tablet  Self No No   Sig: take 1 tablet by mouth once daily   acetaminophen (TYLENOL) 325 mg tablet   No No   Sig: Take 2 tablets (650 mg total) by mouth every 6 (six) hours as needed for mild pain   albuterol (2 5 mg/3 mL) 0 083 % nebulizer solution   No No   Sig: TAKE 3 ML VIA NEBULIZER EVERY 6 (SIX) HOURS AS NEEDED FOR WHEEZING OR SHORTNESS OF BREATH   aspirin 81 mg chewable tablet   No No   Sig: Chew 1 tablet (81 mg total) daily   atorvastatin (LIPITOR) 40 mg tablet  Self Yes No   Patient not taking: Reported on 9/8/2021   chlorproMAZINE (THORAZINE) 100 mg tablet  Self Yes No   Sig: Take 100 mg by mouth daily at bedtime   cyclobenzaprine (FLEXERIL) 5 mg tablet   No No   Sig: Take 1 tablet (5 mg total) by mouth 3 (three) times a day   gabapentin (NEURONTIN) 300 mg capsule   No No   Sig: Take 1 capsule (300 mg total) by mouth 3 (three) times a day   glucose blood test strip   No No   Sig: Use one strip 3 times daily for blood glucose testing  Patient has True Metrix glucometer   glucose monitoring kit (FREESTYLE) monitoring kit  Self No No   Sig: Use 1 each daily Use one each daily to check blood sugars   Please refill with freestyle ed or with whichever brand is covered by insurance   hydrOXYzine HCL (ATARAX) 50 mg tablet  Self Yes No   Sig: Take 50 mg by mouth 3 (three) times a day   insulin glargine (Lantus SoloStar) 100 units/mL injection pen   No No   Sig: Inject 40 Units under the skin daily   lamoTRIgine (LaMICtal) 25 mg tablet   No No   Sig: TAKE ONE (1) TABLET BY MOUTH DAILY   Patient not taking: Reported on 9/8/2021   lisinopril (ZESTRIL) 10 mg tablet   No No   Sig: TAKE TWO (2) TABLETS BY MOUTH DAILY   lurasidone (LATUDA) 80 mg tablet   No No   Sig: Take 1 tablet (80 mg total) by mouth daily with breakfast   metFORMIN (GLUCOPHAGE) 1000 MG tablet   No No   Sig: TAKE ONE (1) TABLET BY MOUTH TWICE DAILY WITH FOOD   nicotine (NICODERM CQ) 21 mg/24 hr TD 24 hr patch   No No   Sig: Place 1 patch on the skin daily   Patient not taking: Reported on 9/8/2021   prazosin (MINIPRESS) 1 mg capsule  Self Yes No   Sig: Take 1 mg by mouth daily at bedtime   rivaroxaban ([place]) 2 5 mg tablet   No No   Sig: Take 1 tablet (2 5 mg total) by mouth 2 (two) times a day with meals   senna-docusate sodium (SENOKOT S) 8 6-50 mg per tablet   No No   Sig: Take 2 tablets by mouth daily at bedtime   traZODone (DESYREL) 100 mg tablet  Self No No   Sig: TAKE 2 TABLETS BY MOUTH DAILY AT BEDTIME   Patient not taking: Reported on 2021      Facility-Administered Medications Last Administration Doses Remaining   lidocaine (LMX) 4 % cream None recorded           Past Medical History:   Diagnosis Date    Asthma     Bipolar 1 disorder ([facility] )     COPD (chronic obstructive pulmonary disease) ([facility] )     Depression     Diabetes mellitus ([place] [address] )     Hypertension     Psychiatric disorder     cutting history    PTSD (post-traumatic stress disorder)     Tendonitis        Past Surgical History:   Procedure Laterality Date    BYPASS FEMORAL-POPLITEAL Left 2021    Procedure: Left Common Femoral Below Knee to Popliteal Bypass with Insitu GSV graft  Left Lower Extremity Angiogram;  Surgeon: Iraida Gunn MD;  [facility]: [facility];  Service: Vascular     SECTION      EAR SURGERY      IR LOWER EXTREMITY ANGIOGRAM  2021    IR LOWER EXTREMITY ANGIOGRAM  2021       Family History   Problem Relation Age of Onset    Hypertension Mother     Diabetes Mother     HIV Mother     Heart disease Mother     No Known Problems Father      I have reviewed and agree with the history as documented      E-Cigarette/Vaping    E-Cigarette Use Never User      E-Cigarette/Vaping Substances    Nicotine No     THC No     CBD No     Flavoring No     Other No     Unknown No      Social History     Tobacco Use    Smoking status: Current Every Day Smoker     Packs/day: 2 00     Years: 17 00     Pack years: 34 00     Types: Cigarettes    Smokeless tobacco: Never Used    Tobacco comment: pt is down to 4 cigarettes a day    Vaping Use    Vaping Use: Never used   Substance Use Topics    Alcohol use: Not Currently     Alcohol/week: 1 0 standard drinks     Types: 1 Shots of liquor per week     Comment: 'I would drink whatever "    Drug use: Not Currently     Types: Cocaine, Marijuana     Comment: 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       Review of Systems   Constitutional: Negative for activity change, appetite change, chills, diaphoresis, fatigue, fever and unexpected weight change  HENT: Negative for congestion, ear discharge, ear pain, mouth sores, sinus pressure, sinus pain, sneezing, sore throat, trouble swallowing and voice change  Eyes: Negative for photophobia, pain, discharge, redness, itching and visual disturbance  Respiratory: Negative for cough, chest tightness and shortness of breath  Cardiovascular: Negative for chest pain, palpitations and leg swelling  Gastrointestinal: Negative for abdominal pain, constipation, nausea and vomiting  Endocrine: Negative for cold intolerance, heat intolerance, polydipsia, polyphagia and polyuria  Genitourinary: Negative for decreased urine volume, difficulty urinating, dysuria, frequency, hematuria and urgency  Musculoskeletal: Negative for arthralgias, back pain, gait problem, joint swelling, myalgias, neck pain and neck stiffness  See hpi   Skin: Negative for color change and rash  Allergic/Immunologic: Negative for immunocompromised state  Neurological: Negative for dizziness, tremors, seizures, syncope, speech difficulty, weakness, light-headedness, numbness and headaches  Hematological: Does not bruise/bleed easily  Psychiatric/Behavioral: Negative for behavioral problems and suicidal ideas  Physical Exam  Physical Exam  Vitals and nursing note reviewed  Constitutional:       General: She is not in acute distress  Appearance: She is well-developed  She is obese  She is not ill-appearing, toxic-appearing or diaphoretic  HENT:      Head: Normocephalic and atraumatic        Right Ear: Tympanic membrane, ear canal and external ear normal  There is no impacted cerumen  Left Ear: Tympanic membrane, ear canal and external ear normal  There is no impacted cerumen  Nose: Nose normal  No congestion or rhinorrhea  Mouth/Throat:      Mouth: Mucous membranes are dry  Pharynx: Oropharynx is clear  No oropharyngeal exudate or posterior oropharyngeal erythema  Eyes:      General: No scleral icterus  Right eye: No discharge  Left eye: No discharge  Extraocular Movements: Extraocular movements intact  Conjunctiva/sclera: Conjunctivae normal       Pupils: Pupils are equal, round, and reactive to light  Neck:      Thyroid: No thyromegaly  Vascular: No hepatojugular reflux or JVD  Trachea: No tracheal deviation  Cardiovascular:      Rate and Rhythm: Normal rate and regular rhythm  Pulses: Normal pulses  Carotid pulses are 2+ on the right side and 2+ on the left side  Radial pulses are 2+ on the right side and 2+ on the left side  Dorsalis pedis pulses are 2+ on the right side and 2+ on the left side  Posterior tibial pulses are 2+ on the right side and 2+ on the left side  Heart sounds: Normal heart sounds  No murmur heard  Pulmonary:      Effort: Pulmonary effort is normal  No accessory muscle usage or respiratory distress  Breath sounds: Normal breath sounds  No wheezing or rales  Chest:      Chest wall: No mass, deformity, tenderness, crepitus or edema  Abdominal:      General: Bowel sounds are normal  There is no distension or abdominal bruit  Palpations: Abdomen is soft  There is no hepatomegaly, splenomegaly or mass  Tenderness: There is no abdominal tenderness  There is no right CVA tenderness, left CVA tenderness, guarding or rebound  Hernia: No hernia is present  Musculoskeletal:         General: Swelling (left edema edematous - staples intact - red and warm around knee staples   pulses palp fo left foot with doppler  foor warm to touch - bilateral feet warm to touch) present  No tenderness  Normal range of motion  Cervical back: Normal range of motion and neck supple  No rigidity  No muscular tenderness  Right lower leg: No tenderness  No edema  Left lower leg: No tenderness  No edema  Lymphadenopathy:      Cervical: No cervical adenopathy  Skin:     General: Skin is warm and dry  Capillary Refill: Capillary refill takes less than 2 seconds  Findings: No ecchymosis or rash  Neurological:      General: No focal deficit present  Mental Status: She is alert and oriented to person, place, and time  Cranial Nerves: No cranial nerve deficit  Sensory: No sensory deficit  Motor: No weakness or abnormal muscle tone  Deep Tendon Reflexes: Reflexes normal    Psychiatric:         Mood and Affect: Mood normal          Behavior: Behavior normal          Thought Content:  Thought content normal          Judgment: Judgment normal                  Vital Signs  ED Triage Vitals [09/23/21 1940]   Temperature Pulse Respirations Blood Pressure SpO2   98 1 °F (36 7 °C) 95 18 134/87 95 %      Temp Source Heart Rate Source Patient Position - Orthostatic VS BP Location FiO2 (%)   Oral Monitor Lying Right arm --      Pain Score       Worst Possible Pain           Vitals:    09/23/21 1940 09/23/21 2234 09/24/21 0045 09/24/21 0257   BP: 134/87 110/62 121/65 113/53   Pulse: 95 98 101 98   Patient Position - Orthostatic VS: Lying Lying Lying Lying         Visual Acuity      ED Medications  Medications   ceFAZolin (ANCEF) IVPB (premix in dextrose) 2,000 mg 50 mL (0 mg Intravenous Stopped 9/24/21 6293)   albuterol inhalation solution 2 5 mg (has no administration in time range)   aspirin chewable tablet 81 mg (has no administration in time range)   atorvastatin (LIPITOR) tablet 40 mg (has no administration in time range)   cyclobenzaprine (FLEXERIL) tablet 5 mg (has no administration in time range)   gabapentin (NEURONTIN) capsule 300 mg (has no administration in time range)   hydrOXYzine HCL (ATARAX) tablet 50 mg (has no administration in time range)   chlorproMAZINE (THORAZINE) tablet 100 mg (has no administration in time range)   lurasidone (LATUDA) tablet 80 mg (has no administration in time range)   lisinopril (ZESTRIL) tablet 20 mg (has no administration in time range)   prazosin (MINIPRESS) capsule 1 mg (has no administration in time range)   traZODone (DESYREL) tablet 200 mg (has no administration in time range)   rivaroxaban (XARELTO) tablet 2 5 mg (has no administration in time range)   QUEtiapine (SEROquel) tablet 300 mg (has no administration in time range)   nicotine (NICODERM CQ) 14 mg/24hr TD 24 hr patch 1 patch (has no administration in time range)   ondansetron (ZOFRAN) injection 4 mg (has no administration in time range)   acetaminophen (TYLENOL) tablet 975 mg (has no administration in time range)   oxyCODONE (ROXICODONE) IR tablet 5 mg (has no administration in time range)   oxyCODONE (ROXICODONE) immediate release tablet 10 mg (has no administration in time range)   morphine injection 2 mg (has no administration in time range)   insulin glargine (LANTUS) subcutaneous injection 40 Units 0 4 mL (has no administration in time range)   insulin lispro (HumaLOG) 100 units/mL subcutaneous injection 1-6 Units (has no administration in time range)   insulin lispro (HumaLOG) 100 units/mL subcutaneous injection 1-6 Units (has no administration in time range)   sodium chloride 0 9 % bolus 1,000 mL (0 mL Intravenous Stopped 9/23/21 2234)   ketorolac (TORADOL) injection 15 mg (15 mg Intravenous Given 9/23/21 1958)   HYDROmorphone (DILAUDID) injection 1 mg (1 mg Intravenous Given 9/23/21 1959)   fentanyl citrate (PF) 100 MCG/2ML 100 mcg (100 mcg Intravenous Given 9/23/21 2128)   iohexol (OMNIPAQUE) 350 MG/ML injection (SINGLE-DOSE) 100 mL (20 mL Intravenous Given 9/23/21 2333)   iohexol (OMNIPAQUE) 350 MG/ML injection (SINGLE-DOSE) 100 mL (100 mL Intravenous Given 9/23/21 2333)   morphine (PF) 4 mg/mL injection 4 mg (4 mg Intravenous Given 9/24/21 0141)       Diagnostic Studies  Results Reviewed     Procedure Component Value Units Date/Time    Blood culture #1 [[id number]] Collected: 09/23/21 2002    Lab Status: Preliminary result Specimen: Blood from Arm, Left Updated: 09/24/21 0201     Blood Culture Received in Microbiology Lab  Culture in Progress  Blood culture #2 [[id number]] Collected: 09/23/21 2002    Lab Status: Preliminary result Specimen: Blood from Arm, Left Updated: 09/24/21 0201     Blood Culture Received in Microbiology Lab  Culture in Progress  Rapid drug screen, urine [[id number]]  (Abnormal) Collected: 09/23/21 2300    Lab Status: Final result Specimen: Urine, Clean Catch Updated: 09/23/21 2321     Amph/Meth UR Negative     Barbiturate Ur Negative     Benzodiazepine Urine Negative     Cocaine Urine Positive     Methadone Urine Negative     Opiate Urine Positive     PCP Ur Negative     THC Urine Positive     Oxycodone Urine Negative    Narrative:      Presumptive report  If requested, specimen will be sent to reference lab for confirmation  FOR MEDICAL PURPOSES ONLY  IF CONFIRMATION NEEDED PLEASE CONTACT THE LAB WITHIN 5 DAYS      Drug Screen Cutoff Levels:  AMPHETAMINE/METHAMPHETAMINES  1000 ng/mL  BARBITURATES     200 ng/mL  BENZODIAZEPINES     200 ng/mL  COCAINE      300 ng/mL  METHADONE      300 ng/mL  OPIATES      300 ng/mL  PHENCYCLIDINE     25 ng/mL  THC       50 ng/mL  OXYCODONE      100 ng/mL    Urine Microscopic [[id number]]  (Normal) Collected: 09/23/21 2300    Lab Status: Final result Specimen: Urine, Clean Catch Updated: 09/23/21 2319     RBC, UA None Seen /hpf      WBC, UA 0-1 /hpf      Epithelial Cells Occasional /hpf      Bacteria, UA None Seen /hpf     UA (URINE) with reflex to Scope [[id number]]  (Abnormal) Collected: 09/23/21 2300    Lab Status: Final result Specimen: Urine, Clean Catch Updated: 09/23/21 2304     Color, UA Yellow     Clarity, UA Clear     Specific Gravity, UA 1 025     pH, UA 6 0     Leukocytes, UA Negative     Nitrite, UA Negative     Protein, UA Negative mg/dl      Glucose, UA 3+ mg/dl      Ketones, UA Trace mg/dl      Urobilinogen, UA >=8 0 E U /dl      Bilirubin, UA Negative     Blood, UA Trace-Intact    CKMB [[id number]]  (Normal) Collected: 09/23/21 2002    Lab Status: Final result Specimen: Blood from Arm, Left Updated: 09/23/21 2054     CK-MB Index 1 2 %      CK-MB 4 7 ng/mL     Comprehensive metabolic panel [[id number]]  (Abnormal) Collected: 09/23/21 2002    Lab Status: Final result Specimen: Blood from Arm, Left Updated: 09/23/21 2031     Sodium 134 mmol/L      Potassium 3 7 mmol/L      Chloride 99 mmol/L      CO2 28 mmol/L      ANION GAP 7 mmol/L      BUN 12 mg/dL      Creatinine 0 79 mg/dL      Glucose 265 mg/dL      Calcium 8 5 mg/dL      Corrected Calcium 9 0 mg/dL      AST 14 U/L      ALT 23 U/L      Alkaline Phosphatase 86 5 U/L      Total Protein 6 7 g/dL      Albumin 3 4 g/dL      Total Bilirubin 0 38 mg/dL      eGFR 113 ml/min/1 73sq m     Narrative:      Ignacio guidelines for Chronic Kidney Disease (CKD):     Stage 1 with normal or high GFR (GFR > 90 mL/min/1 73 square meters)    Stage 2 Mild CKD (GFR = 60-89 mL/min/1 73 square meters)    Stage 3A Moderate CKD (GFR = 45-59 mL/min/1 73 square meters)    Stage 3B Moderate CKD (GFR = 30-44 mL/min/1 73 square meters)    Stage 4 Severe CKD (GFR = 15-29 mL/min/1 73 square meters)    Stage 5 End Stage CKD (GFR <15 mL/min/1 73 square meters)  Note: GFR calculation is accurate only with a steady state creatinine    Lactic acid [[id number]]  (Normal) Collected: 09/23/21 2002    Lab Status: Final result Specimen: Blood from Arm, Left Updated: 09/23/21 2031     LACTIC ACID 1 5 mmol/L     Narrative:      Result may be elevated if tourniquet was used during collection      CK Total with Reflex CKMB [[id number]]  (Abnormal) Collected: 09/23/21 2002    Lab Status: Final result Specimen: Blood from Arm, Left Updated: 09/23/21 2031     Total  8 U/L     Protime-INR [[id number]]  (Normal) Collected: 09/23/21 2002    Lab Status: Final result Specimen: Blood from Arm, Left Updated: 09/23/21 2025     Protime 10 9 seconds      INR 0 96    Narrative:      INR Reference Ranges:  No Anticoagulant, Normal:           0 9-1 1  Standard Dose, Oral Anticoagulant:  2 0-3 0  High Dose, Oral Anticoagulant:      2 5-3 5    APTT [[id number]]  (Normal) Collected: 09/23/21 2002    Lab Status: Final result Specimen: Blood from Arm, Left Updated: 09/23/21 2025     PTT 25 seconds     CBC and differential [[id number]]  (Abnormal) Collected: 09/23/21 2002    Lab Status: Final result Specimen: Blood from Arm, Left Updated: 09/23/21 2012     WBC 10 87 Thousand/uL      RBC 2 91 Million/uL      Hemoglobin 8 1 g/dL      Hematocrit 23 3 %      MCV 80 fL      MCH 27 8 pg      MCHC 34 8 g/dL      RDW 13 1 %      MPV 10 1 fL      Platelets 408 Thousands/uL      Neutrophils Relative 75 %      Immat GRANS % 0 %      Lymphocytes Relative 18 %      Monocytes Relative 5 %      Eosinophils Relative 2 %      Basophils Relative 0 %      Neutrophils Absolute 8 07 Thousands/µL      Immature Grans Absolute 0 03 Thousand/uL      Lymphocytes Absolute 1 92 Thousands/µL      Monocytes Absolute 0 58 Thousand/µL      Eosinophils Absolute 0 24 Thousand/µL      Basophils Absolute 0 03 Thousands/µL                  CT VENOGRAM LOWER EXTREMITIES LEFT w/ contrast    (Results Pending)   CT VENOGRAM LOWER EXTREMITIES RIGHT w/ contrast    (Results Pending)   VAS lower limb arterial duplex, complete bilateral    (Results Pending)              Procedures  Procedures         ED Course  ED Course as of Sep 24 0443   Thu Sep 23, 2021   2329 UDS positive for cocaine      Fri Sep 24, 2021   0055 Awaiting reading of CTA for dispo    With the assistance of PACS I did have the opportunity to speak with pts vascular surgeon who did her fem pop  Requested CTA with run off bilateral to r/o DVTs      0139 Called and attempted to contact the radiology reading room regarding the status of the radiologist read of the CTAs - there was a technical problem with the phone line and unable to connect - attempted call several times      0440 Urinalysis 3+ glucose  Rapid drug screen positive for cocaine  Also positive for opiates which patient is taking for pain and THC  Blood cultures drawn and are pending  Lactic acid normal 1 5  Clotting times normal   Slight leukocytosis at 10 87  Patient has chronic anemia hemoglobin is 8 1 which is stable for patient hematocrit 23 3  Platelet count 320  Electrolytes are normal   Renal function normal with a BUN of 12 creatinine 0 79  Random glucose is 265  Liver enzymes are normal     Patient initially medicated with ketorolac and Dilaudid  Patient given a bolus of normal saline for hydration  Patient was then medicated with IV fentanyl as she had no relief of pain  Patient had a CT [de-identified] with runoff performed  The interpreted by the radiologist   Discussed results with Dr Lali Lance, patient's vascular surgeon  CTA that is of the left foot was interpreted as the fem-pop bypass graft was patent  There was evidence of cellulitis but no abscess  The CTA of the right lower extremities interpreted as normal     Vascular recommended the patient be admitted to this campus and treated for cellulitis  Started patient on Zosyn  I then discussed this with the hospitalist who accepted the patient for admission  I also did discuss with the hospitalist that vascular would like her to have a bilateral lower extremity arterial duplex Doppler in the [facility]  Kendrick Maravilla Discussed plan with patient and she is agreement with the plan        2440 All imaging and/or lab testing discussed with patient   Patient and/or family members verbalizes understanding and agrees with plan for admission  Patient is stable for admission      Portions of the record may have been created with voice recognition software  Occasional wrong word or "sound a like" substitutions may have occurred due to the inherent limitations of voice recognition software  Read the chart carefully and recognize, using context, where substitutions have occurred  SBIRT 20yo+      Most Recent Value   SBIRT (24 yo +)   In order to provide better care to our patients, we are screening all of our patients for alcohol and drug use  Would it be okay to ask you these screening questions? Unable to answer at this time Filed at: 09/24/2021 0021                    MDM  Number of Diagnoses or Management Options  Cellulitis: new and requires workup  Cocaine abuse [facility]): minor  Left leg pain: established and worsening  PVD (peripheral vascular disease) ([facility] [address] ): established and worsening  Diagnosis management comments: This is a 30year old obese female with PAD having recent left fem pop grafting, asthma, bipolar disorder, diabetes,hypertension, PTSD that presents to the ED with ioncreased pain and edema of the surgical left leg  Staples are intact  There is redness and warmth of the incision area of the right knee  Pulses of the left foot strong per doppler  Pulses of right foot palpable  The entire left leg is edematous and tender to touch  Pt screaming in pain  Review of last medical records shows cocaine in UDS - repeated today - continues to have cocaine in UDS  Concerned for graft occlusion, DVT, cellulitis, dependant edema  malingering,bursisitis of the left knee and other concerns    Immediately upon arrival I did check the pulse of both feet  Labs ordered and contacted vascular surgeon and advised of findings - requested CTA with runoff of both LE - she arrived shortly after 7 - vascular on call at this [place] until 8 pm however no response to tigertext          Amount and/or Complexity of Data Reviewed  Clinical lab tests: ordered and reviewed  Tests in the radiology section of CPT®: ordered and reviewed  Obtain history from someone other than the patient: yes (ems)  Discuss the patient with other providers: yes (Dr Sandy Meneses  hospitalist)  Independent visualization of images, tracings, or specimens: yes    Risk of Complications, Morbidity, and/or Mortality  Presenting problems: high  Diagnostic procedures: moderate  Management options: moderate    Patient Progress  Patient progress: stable      Disposition  Final diagnoses:   PVD (peripheral vascular disease) ([email or web address] )   Left leg pain   Cellulitis   Cocaine abuse ([place] [address] )     Time reflects when diagnosis was documented in both MDM as applicable and the Disposition within this note     Time User Action Codes Description Comment    9/23/2021  9:07 PM Nuala Boatman Add [I73 9] PVD (peripheral vascular disease) ([email or web address] 75 )     9/23/2021  9:07 PM Nuala Boatman Add [M79 605] Left leg pain     9/24/2021  2:27 AM Nuala Boatman Add [L03 90] Cellulitis     9/24/2021  2:32 AM Nuala Boatman Add [F14 10] Cocaine abuse ([place] [address] )     9/24/2021  2:42 AM JayantegassOseas Zenobia Add [I70 209] Superficial femoral artery occlusion ([email or web address] 75 )     9/24/2021  2:42 AM RichardssOseas Zenobia Add [I70 202] Atherosclerosis of left leg [facility])       ED Disposition     ED Disposition Condition Date/Time Comment    Admit Stable Fri Sep 24, 2021  2:27 AM         Follow-up Information    None         Patient's Medications   Discharge Prescriptions    No medications on file     No discharge procedures on file      PDMP Review       Value Time User    PDMP Reviewed  Yes 4/25/2021  8:59 PM Trev Gallagher MD          ED Provider  Electronically Signed by           Trev Gallagher MD  09/24/21 9215

## 2021-09-24 NOTE — CASE MANAGEMENT
Case Management Discharge Planning Note    Patient name Carlos Olvera  Location /-25 MRN [id number]  : 1985 Date 2021       Current Admission Date: 2021  Current Admission Diagnosis:  Cellulitis of left lower extremity  Previous Admission - Discharge Date:21   LOS (days): 0  Geometric Mean LOS (GMLOS) (days): 3 20  Days to GMLOS:2 6 Previous Discharge Diagnosis:  There are no discharge diagnoses documented for the most recent discharge  OBJECTIVE:  Risk of Unplanned Readmission Score: 40   Bundle(if applicable):    Current admission status: Inpatient  Preferred Pharmacy:   [address] [place],9D, [facility] [address] [facility]  Phone: [phone] Fax: [address] [address]  Phone: [phone] Fax: [phone]    Primary Care Provider: Alex Dillon DO    Primary Insurance: MEDICARE  Secondary Insurance: [address]:    [address] [place] [address]         Is the patient interested in [address] at discharge?: Yes  [address] Delgado requested[de-identified] Nursing, Occupational Therapy, Physical [address] Name[de-identified] [facility] Provider[de-identified] PCP  Home Health Services Needed[de-identified] COPD Management, Evaluate Functional Status and Safety, Gait/ADL Training, Strengthening/Theraputic Exercises to Improve Function, Diabetes Management  Homebound Criteria Met[de-identified] Uses an Assist Device (i e  cane, walker, etc), Requires the Assistance of Another Person for Safe Ambulation or to Leave the Home  Supporting Clincal Findings[de-identified] Fatigues Easliy in [employer], Limited Endurance  [facility] confirmed for LUIS CARLOS at discharge - AVS updated

## 2021-09-24 NOTE — H&P
Roshan U  67   H&P- Jeanine Storm 1985, 30 y o  female MRN: [id number]  Unit/Bed#: ED 13 Encounter: [id number]  Primary Care Provider: Omayra Molina DO   Date and time admitted to hospital: 9/23/2021  7:39 PM    * Cellulitis of left lower extremity  Assessment & Plan  · Patient w/ L leg pain, worsening since fem-pop bypass 9/9/21  · CT venogram performed-- fem-pop bypass is patent, subcutaneous edema and cellulitis  · IV abx w/ Ancef at this time  · Serial skin exams  · Elevate LLE  · Pain control  · Vascular surgery consult placed-- appreciate input    Body mass index (BMI)40 0-44 9, adult ([place] [address] )  Assessment & Plan  · Encourage lifestyle and dietary modification    PVD (peripheral vascular disease) ([place] [address] )  Assessment & Plan  · S/P L fem-pop bypass 9/9/21  · Bilateral LE pulses present by doppler; extremities warm   · Continue w/ aspirin, statin, Xarelto    Bilateral leg pain  Assessment & Plan  · Patient w/ L leg pain + swelling and R leg pain/paresthesias  · CTA w/ runoff performed bilaterally-- cellulitis noted LLE, no acute issues RLE  · Denies back pain/back injury/saddle anesthesia/urinary incontinence/bowel incontinence  · Continue w elevation   · Pain control-- continue gabapentin   · Serial examinations  · Per vascular: recommend bilateral arterial duplex; will place vascular surgery consult    Bipolar disorder (HCC)  Assessment & Plan  · Continue thorazine, Latuda, Seroquel, Trazodone    COPD (chronic obstructive pulmonary disease) ([place] [address] )  Assessment & Plan  · No wheezing appreciated  · Continue PRN albuterol     Type 2 diabetes mellitus with diabetic polyneuropathy, with long-term current use of insulin (HCC)  Assessment & Plan  Lab Results   Component Value Date    HGBA1C 8 5 (H) 09/08/2021       No results for input(s): POCGLU in the last 72 hours      Blood Sugar Average: Last 72 hrs:  · poorly controlled per most recent hgb T4S  · Hold Trulicity, Metformin  · Continue w Lantus 40U SQ QHS  · Add SSI for correction   · QID accuchecks    Hypertension  Assessment & Plan  · Continue lisinopril, prazosin    VTE Pharmacologic Prophylaxis: VTE Score: 3 Moderate Risk (Score 3-4) - Pharmacological DVT Prophylaxis Ordered: rivaroxaban (Xarelto)  Code Status: level 1  Discussion with family: Patient declined call to   Anticipated Length of Stay: Patient will be admitted on an inpatient basis with an anticipated length of stay of greater than 2 midnights secondary to tx cellulitis, pain  Total Time for Visit, including Counseling / Coordination of Care: 30 minutes Greater than 50% of this total time spent on direct patient counseling and coordination of care  Chief Complaint: bilateral leg pain    History of Present Illness:  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 with a PMH of PVD, HTN, DM w/ polyneuropathy, illicit drug use who presents with bilateral leg pain  Patient reports that she had surgery on her L leg about 2 weeks ago  Since then she has had worsening pain/swelling in her L leg  Pain starts at her mid calf and radiates into her groin  She reports that her L leg feels 'heavy ' on a scale of 1-10, her pain in '100 ' She reports compliance w/ her medications and wound care  She has been taking pain medication at home w/ minimal relief  She also complains of R leg paresthesias and says that the inside of her right leg 'feels cold ' she denies any back pain, incontinence, groin numbness/tingling  She did speak to vascular provider about 4 days ago for the same sx, and she was recommended to get STAT arterial US which was not completed  Review of Systems:  Review of Systems   Constitutional: Negative  HENT: Negative  Eyes: Negative  Respiratory: Negative  Cardiovascular: Positive for leg swelling  Gastrointestinal: Negative  Genitourinary: Negative  Musculoskeletal: Negative  Skin: Positive for wound     Neurological: Positive for numbness  Hematological: Negative  Psychiatric/Behavioral: Negative  Past Medical and Surgical History:   Past Medical History:   Diagnosis Date    Asthma     Bipolar 1 disorder ([facility] )     COPD (chronic obstructive pulmonary disease) ([facility] )     Depression     Diabetes mellitus ([place] [address] )     Hypertension     Psychiatric disorder     cutting history    PTSD (post-traumatic stress disorder)     Tendonitis        Past Surgical History:   Procedure Laterality Date    BYPASS FEMORAL-POPLITEAL Left 2021    Procedure: Left Common Femoral Below Knee to Popliteal Bypass with Insitu GSV graft  Left Lower Extremity Angiogram;  Surgeon: Fernando Hall MD;  [facility];  Service: Vascular     SECTION      EAR SURGERY      IR LOWER EXTREMITY ANGIOGRAM  2021    IR LOWER EXTREMITY ANGIOGRAM  2021       Meds/Allergies:  Prior to Admission medications    Medication Sig Start Date End Date Taking?  Authorizing Provider   acetaminophen (TYLENOL) 325 mg tablet Take 2 tablets (650 mg total) by mouth every 6 (six) hours as needed for mild pain 21   EMILE Ventura   albuterol (2 5 mg/3 mL) 0 083 % nebulizer solution TAKE 3 ML VIA NEBULIZER EVERY 6 (SIX) HOURS AS NEEDED FOR WHEEZING OR SHORTNESS OF BREATH 21   Radha Macdonald DO   aspirin 81 mg chewable tablet Chew 1 tablet (81 mg total) daily 21   Javier Quiroz MD   atorvastatin (LIPITOR) 40 mg tablet  20   Historical Provider, MD   BD Pen Needle Micro U/F 32G X 6 MM MISC use 1 NEEDLE to inject MEDICATION subcutaneously twice a day 20   Saad Dus, DO   Blood Glucose Monitoring Suppl (True Metrix Air Glucose Meter) w/Device KIT use as directed 20   Jhony Lua DO   Blood Pressure Monitoring (Blood Pressure Monitor Automat) KATLYN Use Daily as Directed 3/5/21   Jhony Lua DO   chlorproMAZINE (THORAZINE) 100 mg tablet Take 100 mg by mouth daily at bedtime 20   Historical Provider, MD cyclobenzaprine (FLEXERIL) 5 mg tablet Take 1 tablet (5 mg total) by mouth 3 (three) times a day 9/17/21   Reshma Kapadia MD   Diclofenac Sodium (VOLTAREN) 1 % APPLY 2 G TOPICALLY 4 (FOUR) TIMES A DAY 9/9/21   Eleazar Rodriguez MD   Dulaglutide (Trulicity) 1 5 OT/7 [facility] 7/26/21   Meredith Early DO   gabapentin (NEURONTIN) 300 mg capsule Take 1 capsule (300 mg total) by mouth 3 (three) times a day 9/17/21   Reshma Kapadia MD   glucose blood test strip Use one strip 3 times daily for blood glucose testing  Patient has True Metrix glucometer 7/26/21   Meredith Early DO   glucose monitoring kit (FREESTYLE) monitoring kit Use 1 each daily Use one each daily to check blood sugars   Please refill with freestyle ed or with whichever brand is covered by insurance 11/24/20   Meredith Early DO   HYDROmorphone (DILAUDID) 2 mg tablet Take 1 tablet (2 mg total) by mouth every 6 (six) hours as needed for severe pain for up to 10 daysMax Daily Amount: 8 mg 9/17/21 9/27/21  Rema Green MD   hydrOXYzine HCL (ATARAX) 50 mg tablet Take 50 mg by mouth 3 (three) times a day 11/18/20   Historical Provider, MD   insulin glargine (Lantus SoloStar) 100 units/mL injection pen Inject 40 Units under the skin daily 8/27/21   EMILE Weston   lamoTRIgine (LaMICtal) 25 mg tablet TAKE ONE (1) TABLET BY MOUTH DAILY  Patient not taking: Reported on 9/8/2021 8/6/21   Meredith Early DO   lisinopril (ZESTRIL) 10 mg tablet TAKE TWO (2) TABLETS BY MOUTH DAILY 8/6/21   Meredith Early DO   lurasidone (LATUDA) 80 mg tablet Take 1 tablet (80 mg total) by mouth daily with breakfast 8/28/21   EMILE Weston   metFORMIN (GLUCOPHAGE) 1000 MG tablet TAKE ONE (1) TABLET BY MOUTH TWICE DAILY WITH FOOD 8/20/21   Meredith Early DO   nicotine (NICODERM CQ) 21 mg/24 hr TD 24 hr patch Place 1 patch on the skin daily  Patient not taking: Reported on 9/8/2021 8/28/21   EMILE Weston   prazosin (MINIPRESS) 1 mg capsule Take 1 mg by mouth daily at bedtime 11/18/20   Historical Provider, MD   QUEtiapine (SEROquel) 300 mg tablet take 1 tablet by mouth once daily 11/19/20   Kraig Caro DO   rivaroxaban (XARELTO) 2 5 mg tablet Take 1 tablet (2 5 mg total) by mouth 2 (two) times a day with meals 9/17/21   Alex Sage MD   senna-docusate sodium (SENOKOT S) 8 6-50 mg per tablet Take 2 tablets by mouth daily at bedtime 9/17/21   Alex Sage MD   traZODone (DESYREL) 100 mg tablet TAKE 2 TABLETS BY MOUTH DAILY AT BEDTIME  Patient not taking: Reported on 9/8/2021 11/19/20   Kraig Caro DO   Diclofenac Sodium (VOLTAREN) 1 % APPLY 2 G TOPICALLY 4 (FOUR) TIMES A DAY 5/12/21   Torri Zambrano MD     I have reviewed home medications using recent Epic encounter      Allergies: No Known Allergies    Social History:  Marital Status: /Civil Union   Occupation:   Patient Pre-hospital Living Situation: Home, With spouse  Patient Pre-hospital Level of Mobility: walks  Patient Pre-hospital Diet Restrictions: none  Substance Use History:   Social History     Substance and Sexual Activity   Alcohol Use Not Currently    Alcohol/week: 1 0 standard drinks    Types: 1 Shots of liquor per week    Comment: 'I would drink whatever "     Social History     Tobacco Use   Smoking Status Current Every Day Smoker    Packs/day: 2 00    Years: 17 00    Pack years: 34 00    Types: Cigarettes   Smokeless Tobacco Never Used   Tobacco Comment    pt is down to 4 cigarettes a day      Social History     Substance and Sexual Activity   Drug Use Not Currently    Types: Cocaine, Marijuana    Comment: 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       Family History:  Family History   Problem Relation Age of Onset    Hypertension Mother     Diabetes Mother     HIV Mother     Heart disease Mother     No Known Problems Father        Physical Exam:     Vitals:   Blood Pressure: 113/53 (09/24/21 0257)  Pulse: 98 (09/24/21 0257)  Temperature: 98 1 °F (36 7 °C) (09/23/21 1940)  Temp Source: Oral (09/23/21 1940)  Respirations: 18 (09/24/21 0257)  Height: 5' 1" (154 9 cm) (09/23/21 1940)  Weight - Scale: 97 1 kg (214 lb) (09/23/21 1940)  SpO2: 100 % (09/24/21 0257)    Physical Exam  Constitutional:       General: She is in acute distress (noted to be on comfortable appearing & on phone upon this providers arrival, but she shortly became distressed )  Appearance: Normal appearance  She is obese  She is not ill-appearing  HENT:      Head: Normocephalic  Mouth/Throat:      Mouth: Mucous membranes are moist    Eyes:      Pupils: Pupils are equal, round, and reactive to light  Cardiovascular:      Rate and Rhythm: Normal rate and regular rhythm  Heart sounds: No murmur heard  No friction rub  No gallop  Pulmonary:      Effort: Pulmonary effort is normal       Breath sounds: Normal breath sounds  No wheezing  Abdominal:      General: Abdomen is flat  Bowel sounds are normal       Palpations: Abdomen is soft  Tenderness: There is no abdominal tenderness  Musculoskeletal:         General: Tenderness (L leg, incisional) present  Right lower leg: No edema  Left lower leg: Edema present  Skin:     General: Skin is warm and dry  Findings: No erythema  Comments: Staples to medial L thigh/calf that are not erythematous/draining   Neurological:      General: No focal deficit present  Mental Status: She is alert and oriented to person, place, and time  Mental status is at baseline  Sensory: Sensory deficit (decreased sensation to crude touch bilateral feet) present     Psychiatric:         Mood and Affect: Mood normal          Additional Data:     Lab Results:  Results from last 7 days   Lab Units 09/23/21 2002   WBC Thousand/uL 10 87*   HEMOGLOBIN g/dL 8 1*   HEMATOCRIT % 23 3*   PLATELETS Thousands/uL 430*   NEUTROS PCT % 75   LYMPHS PCT % 18   MONOS PCT % 5   EOS PCT % 2     Results from last 7 days   Lab Units 09/23/21 2002   SODIUM mmol/L 134   POTASSIUM mmol/L 3 7   CHLORIDE mmol/L 99   CO2 mmol/L 28   BUN mg/dL 12   CREATININE mg/dL 0 79   ANION GAP mmol/L 7   CALCIUM mg/dL 8 5   ALBUMIN g/dL 3 4   TOTAL BILIRUBIN mg/dL 0 38   ALK PHOS U/L 86 5   ALT U/L 23   AST U/L 14*   GLUCOSE RANDOM mg/dL 265*     Results from last 7 days   Lab Units 09/23/21 2002   INR  0 96     Results from last 7 days   Lab Units 09/17/21  1200 09/17/21  0717 09/17/21  0650   POC GLUCOSE mg/dl 111 90 62*         Results from last 7 days   Lab Units 09/23/21 2002   LACTIC ACID mmol/L 1 5       Imaging: Reviewed radiology reports from this admission including: CTA bilateral LE  CT VENOGRAM LOWER EXTREMITIES LEFT w/ contrast    (Results Pending)   CT VENOGRAM LOWER EXTREMITIES RIGHT w/ contrast    (Results Pending)   VAS lower limb arterial duplex, complete bilateral    (Results Pending)       EKG and Other Studies Reviewed on Admission:   · EKG: No EKG obtained  ** Please Note: This note has been constructed using a voice recognition system   **

## 2021-09-25 VITALS
BODY MASS INDEX: 43.45 KG/M2 | DIASTOLIC BLOOD PRESSURE: 73 MMHG | OXYGEN SATURATION: 100 % | SYSTOLIC BLOOD PRESSURE: 143 MMHG | RESPIRATION RATE: 20 BRPM | TEMPERATURE: 99 F | HEART RATE: 64 BPM | WEIGHT: 230.16 LBS | HEIGHT: 61 IN

## 2021-09-25 LAB
GLUCOSE SERPL-MCNC: 185 MG/DL (ref 65–140)
GLUCOSE SERPL-MCNC: 229 MG/DL (ref 65–140)

## 2021-09-25 PROCEDURE — 82948 REAGENT STRIP/BLOOD GLUCOSE: CPT

## 2021-09-25 PROCEDURE — 87081 CULTURE SCREEN ONLY: CPT | Performed by: INTERNAL MEDICINE

## 2021-09-25 PROCEDURE — 99239 HOSP IP/OBS DSCHRG MGMT >30: CPT | Performed by: PHYSICIAN ASSISTANT

## 2021-09-25 PROCEDURE — 93922 UPR/L XTREMITY ART 2 LEVELS: CPT | Performed by: SURGERY

## 2021-09-25 PROCEDURE — 93926 LOWER EXTREMITY STUDY: CPT | Performed by: SURGERY

## 2021-09-25 RX ORDER — CEPHALEXIN 500 MG/1
500 CAPSULE ORAL EVERY 6 HOURS SCHEDULED
Qty: 36 CAPSULE | Refills: 0 | Status: SHIPPED | OUTPATIENT
Start: 2021-09-25 | End: 2021-10-04

## 2021-09-25 RX ORDER — HYDROMORPHONE HYDROCHLORIDE 2 MG/1
2 TABLET ORAL EVERY 6 HOURS PRN
Qty: 10 TABLET | Refills: 0 | Status: SHIPPED | OUTPATIENT
Start: 2021-09-25 | End: 2021-10-05

## 2021-09-25 RX ADMIN — CYCLOBENZAPRINE HYDROCHLORIDE 5 MG: 10 TABLET, FILM COATED ORAL at 08:48

## 2021-09-25 RX ADMIN — ASPIRIN 81 MG: 81 TABLET, CHEWABLE ORAL at 08:49

## 2021-09-25 RX ADMIN — GABAPENTIN 300 MG: 300 CAPSULE ORAL at 08:49

## 2021-09-25 RX ADMIN — LISINOPRIL 20 MG: 20 TABLET ORAL at 08:49

## 2021-09-25 RX ADMIN — ACETAMINOPHEN 975 MG: 325 TABLET, FILM COATED ORAL at 13:55

## 2021-09-25 RX ADMIN — NICOTINE 1 PATCH: 14 PATCH, EXTENDED RELEASE TRANSDERMAL at 08:50

## 2021-09-25 RX ADMIN — CEFAZOLIN SODIUM 2000 MG: 2 SOLUTION INTRAVENOUS at 12:29

## 2021-09-25 RX ADMIN — QUETIAPINE FUMARATE 300 MG: 100 TABLET ORAL at 08:49

## 2021-09-25 RX ADMIN — ACETAMINOPHEN 975 MG: 325 TABLET, FILM COATED ORAL at 05:43

## 2021-09-25 RX ADMIN — CEFAZOLIN SODIUM 2000 MG: 2 SOLUTION INTRAVENOUS at 03:29

## 2021-09-25 RX ADMIN — INSULIN LISPRO 2 UNITS: 100 INJECTION, SOLUTION INTRAVENOUS; SUBCUTANEOUS at 09:00

## 2021-09-25 RX ADMIN — RIVAROXABAN 2.5 MG: 2.5 TABLET, FILM COATED ORAL at 09:01

## 2021-09-25 RX ADMIN — HYDROXYZINE HYDROCHLORIDE 50 MG: 25 TABLET ORAL at 08:48

## 2021-09-25 RX ADMIN — LURASIDONE HYDROCHLORIDE 80 MG: 80 TABLET, FILM COATED ORAL at 09:00

## 2021-09-25 RX ADMIN — INSULIN LISPRO 1 UNITS: 100 INJECTION, SOLUTION INTRAVENOUS; SUBCUTANEOUS at 12:29

## 2021-09-25 NOTE — DISCHARGE SUMMARY
Roshan U  67   [facility] 1985[facility] 30 [facility]  [facility] MRN: [id number]  Unit/Bed#: -01 Encounter: [id number]  Primary Care Provider: Homero Bautista,    Date and time admitted to hospital: 9/23/2021  7:39 PM    * Cellulitis of left lower extremity  Assessment & Plan  · Patient w/ L leg pain, worsening since fem-pop bypass 9/9/21   CT venogram performed with fem-pop bypass is patent, subcutaneous edema and cellulitis  · Blood cultures negative, leg improved   · Stable for discharge   · Continue Keflex, treat for 10 days total   · VNA ordered   · Follow up with Vascular for staple removal   · Short course of pain medication sent with patient     PVD (peripheral vascular disease) ([facility] )  Assessment & Plan  · S/P L fem-pop bypass 9/9/21  · Bilateral LE pulses present by doppler,  extremities warm   · Continue w/ aspirin, statin, Xarelto    Type 2 diabetes mellitus with diabetic polyneuropathy, with long-term current use of insulin [facility])  Assessment & Plan  Lab Results   Component Value Date    HGBA1C 8 5 (H) 09/08/2021       Recent Labs     09/24/21  1602 09/24/21  2239 09/25/21  0812 09/25/21  1128   POCGLU 252* 112 229* 185*       Blood Sugar Average: Last 72 hrs:  · (P) 209 2   · Poorly controlled per most recent hgb S4Y  · Restart Trulicity, Metformin  · Continue w Lantus 40U SQ QHS    COPD (chronic obstructive pulmonary disease) ([facility])  Assessment & Plan  · No wheezing appreciated  · Continue PRN albuterol     Bipolar disorder ([facility])  Assessment & Plan  · Appears relatively stable   · Continue thorazine, Latuda, Seroquel, Trazodone    Hypertension  Assessment & Plan  · BP acceptable   · Continue lisinopril, prazosin    Bilateral leg pain  Assessment & Plan  · Patient w/ L leg pain + swelling and R leg pain/paresthesias  · CTA w/ runoff performed bilaterally-- cellulitis noted LLE, no acute issues RLE  · Denies back pain/back injury/saddle anesthesia/urinary incontinence/bowel incontinence  · Continue w elevation   · Pain control-- continue gabapentin   · Serial examinations  · Per vascular: recommend bilateral arterial duplex; will place vascular surgery consult    Body mass index (BMI)40 0-44 9, adult [facility])  Assessment & Plan  · Encourage lifestyle and dietary modification      Medical Problems     Resolved Problems  Date Reviewed: 9/25/2021    None              Discharging Physician / Practitioner: Aleyda Leos PA-C  PCP: Sohan Last, DO  Admission Date:   Admission Orders (From admission, onward)     Ordered        09/24/21 0234  INPATIENT ADMISSION  Once                   Discharge Date: 09/25/21    Consultations During Hospital Stay:  · Vascular Surgery - Dr Melara Fails     Procedures Performed:   · CT Venogram LE - left and right   · VAS LE Arterial Duplex   · Left lower extremity venous duplex    Significant Findings / Test Results:   · CT Venogram LE (left and right) - patent left femoral to popliteal bypass and graft  Small fluid collection anterior to the proximal aspect of the graft in the thigh measuring 3 x 2 cm  This appears simple  Extensive subcutaneous edema involving the left lower extremity  Surgical staple seen in the medial thigh  Limited/poor evaluation of the left common femoral artery distally proximal to the anastomosis  It is patent with possible mild narrowing  Mild narrowing of the left popliteal vein at the site of anastomosis with no thrombus  Minimal edema/fluid seen tracking between the muscle bundles in the posterior compartment inferior to the bypass graft  · VAS LE arterial duplex - patent femoral popliteal bypass graft  There is 50-75% stenosis at the proximal and stenosis  There is a patent branch of the bypass graft in the proximal/mid thigh  Triphasic waveforms are visualized at the ankle  · Left lower extremity venous duplex - no evidence of thrombus in the common femoral vein    No evidence of acute or chronic deep vein thrombosis  No evidence of superficial thrombophlebitis noted    Incidental Findings:   · None     Test Results Pending at Discharge (will require follow up): · None     Outpatient Tests Requested:  · Follow up with Vascular Surgery     Complications:  None    Reason for Admission: Left Leg Pain, cellulitis     Hospital Course:   Elisa Key is a 30 y o  female patient who originally presented to the hospital on 9/23/2021 due to left leg pain  Patient is status post fem-pop bypass on 09/09  Patient was admitted for IV antibiotics as well as vascular surgery was consulted  Vascular studies were performed as above that showed patent bypass grafts as well as negative deep vein thrombosis  The patient responded well to IV Ancef and her blood cultures were found to be negative at 24 hours  Given this, the patient was deemed stable for discharge home and will complete a course of Keflex for cellulitis  She was given a short course of pain medication to take at home as well  She was re-referred to vascular surgery to have her staples removed  No comment and when these were supposed to be removed were in vascular surgery consult note  She will also follow up with her primary care provider  The patient was sent in for visiting nurses at home to continue with physical therapy occupational therapy as well as monitor the wound  Please see above list of diagnoses and related plan for additional information  Condition at Discharge: stable    Discharge Day Visit / Exam:   Subjective:  Patient reports some pain  States she is able to walk very gingerly  Denies fevers or chills  Agreeable to go home    Vitals: Blood Pressure: 143/73 (09/25/21 0810)  Pulse: 64 (09/25/21 0900)  Temperature: 99 °F (37 2 °C) (09/25/21 0810)  Temp Source: Oral (09/25/21 0810)  Respirations: 20 (09/25/21 0810)  Height: 5' 1" (154 9 cm) (09/24/21 1300)  Weight - Scale: 104 kg (230 lb 2 6 oz) (09/24/21 1300)  SpO2: 100 % (09/25/21 0810)  Exam:   Physical Exam  Constitutional:       General: She is sleeping  She is not in acute distress  Appearance: Normal appearance  She is normal weight  She is not ill-appearing or diaphoretic  Comments: Patient was found to be sleeping on my entrance to the room, however then complained of 10 out 10 pain   HENT:      Head: Normocephalic and atraumatic  Mouth/Throat:      Mouth: Mucous membranes are moist    Eyes:      General: No scleral icterus  Pupils: Pupils are equal, round, and reactive to light  Cardiovascular:      Rate and Rhythm: Normal rate and regular rhythm  Pulses: Normal pulses  Heart sounds: Normal heart sounds, S1 normal and S2 normal  No murmur heard  No systolic murmur is present  No diastolic murmur is present  No gallop  No S3 or S4 sounds  Pulmonary:      Effort: Pulmonary effort is normal  No accessory muscle usage or respiratory distress  Breath sounds: Normal breath sounds  No stridor  No decreased breath sounds, wheezing, rhonchi or rales  Chest:      Chest wall: No tenderness  Abdominal:      General: Bowel sounds are normal  There is no distension  Palpations: Abdomen is soft  Tenderness: There is no abdominal tenderness  There is no guarding  Musculoskeletal:      Right lower leg: No edema  Left lower leg: No edema  Skin:     General: Skin is warm and dry  Coloration: Skin is not jaundiced  Neurological:      General: No focal deficit present  Mental Status: Mental status is at baseline  Motor: No tremor or seizure activity  Psychiatric:         Behavior: Behavior is cooperative  Discussion with Family: Updated  (family member) at bedside  Discharge instructions/Information to patient and family:   See after visit summary for information provided to patient and family        Provisions for Follow-Up Care:  See after visit summary for information related to follow-up care and any pertinent home health orders  Disposition:   Home with VNA Services (Reminder: Complete face to face encounter)    Planned Readmission: None     Discharge Statement:  I spent 45 minutes discharging the patient  This time was spent on the day of discharge  I had direct contact with the patient on the day of discharge  Greater than 50% of the total time was spent examining patient, answering all patient questions, arranging and discussing plan of care with patient as well as directly providing post-discharge instructions  Additional time then spent on discharge activities  Discharge Medications:  See after visit summary for reconciled discharge medications provided to patient and/or family        **Please Note: This note may have been constructed using a voice recognition system**

## 2021-09-25 NOTE — ASSESSMENT & PLAN NOTE
Lab Results   Component Value Date    HGBA1C 8 5 (H) 09/08/2021       No results for input(s): POCGLU in the last 72 hours      Blood Sugar Average: Last 72 hrs:  · poorly controlled per most recent hgb A1U  · Hold Trulicity, Metformin  · Continue w Lantus 40U SQ QHS  · Add SSI for correction   · QID accuchecks
Lab Results   Component Value Date    HGBA1C 8 5 (H) 09/08/2021       Recent Labs     09/24/21  1602 09/24/21  2239 09/25/21  0812 09/25/21  1128   POCGLU 252* 112 229* 185*       Blood Sugar Average: Last 72 hrs:  · (P) 209 2   · Poorly controlled per most recent hgb E3B  · Restart Trulicity, Metformin  · Continue w Lantus 40U SQ QHS
· Appears relatively stable   · Continue thorazine, Latuda, Seroquel, Trazodone
· BP acceptable   · Continue lisinopril, prazosin
· Continue lisinopril, prazosin
· Continue thorazine, Latuda, Seroquel, Trazodone
· Encourage lifestyle and dietary modification
· Encourage lifestyle and dietary modification
· No wheezing appreciated  · Continue PRN albuterol
· No wheezing appreciated  · Continue PRN albuterol
· Patient w/ L leg pain + swelling and R leg pain/paresthesias  · CTA w/ runoff performed bilaterally-- cellulitis noted LLE, no acute issues RLE  · Denies back pain/back injury/saddle anesthesia/urinary incontinence/bowel incontinence  · Continue w elevation   · Pain control-- continue gabapentin   · Serial examinations  · Per vascular: recommend bilateral arterial duplex; will place vascular surgery consult
· Patient w/ L leg pain + swelling and R leg pain/paresthesias  · CTA w/ runoff performed bilaterally-- cellulitis noted LLE, no acute issues RLE  · Denies back pain/back injury/saddle anesthesia/urinary incontinence/bowel incontinence  · Continue w elevation   · Pain control-- continue gabapentin   · Serial examinations  · Per vascular: recommend bilateral arterial duplex; will place vascular surgery consult
· Patient w/ L leg pain, worsening since fem-pop bypass 9/9/21   CT venogram performed with fem-pop bypass is patent, subcutaneous edema and cellulitis  · Blood cultures negative, leg improved   · Stable for discharge   · Continue Keflex, treat for 10 days total   · VNA ordered   · Follow up with Vascular for staple removal   · Short course of pain medication sent with patient
· Patient w/ L leg pain, worsening since fem-pop bypass 9/9/21  · CT venogram performed-- fem-pop bypass is patent, subcutaneous edema and cellulitis  · IV abx w/ Ancef at this time  · Serial skin exams  · Elevate LLE  · Pain control  · Vascular surgery consult placed-- appreciate input
· S/P L fem-pop bypass 9/9/21  · Bilateral LE pulses present by doppler,  extremities warm   · Continue w/ aspirin, statin, Xarelto
· S/P L fem-pop bypass 9/9/21  · Bilateral LE pulses present by doppler; extremities warm   · Continue w/ aspirin, statin, Xarelto
temporal

## 2021-09-26 LAB
MRSA NOSE QL CULT: ABNORMAL
MRSA NOSE QL CULT: ABNORMAL

## 2021-09-27 DIAGNOSIS — Z71.89 COMPLEX CARE COORDINATION: Primary | ICD-10-CM

## 2021-09-28 ENCOUNTER — TRANSITIONAL CARE MANAGEMENT (OUTPATIENT)
Dept: INTERNAL MEDICINE CLINIC | Facility: CLINIC | Age: 36
End: 2021-09-28

## 2021-09-28 ENCOUNTER — OFFICE VISIT (OUTPATIENT)
Dept: CARDIOLOGY CLINIC | Facility: CLINIC | Age: 36
End: 2021-09-28
Payer: MEDICARE

## 2021-09-28 ENCOUNTER — TELEPHONE (OUTPATIENT)
Dept: INTERNAL MEDICINE CLINIC | Facility: CLINIC | Age: 36
End: 2021-09-28

## 2021-09-28 VITALS
OXYGEN SATURATION: 98 % | HEART RATE: 102 BPM | DIASTOLIC BLOOD PRESSURE: 80 MMHG | WEIGHT: 224 LBS | BODY MASS INDEX: 42.29 KG/M2 | HEIGHT: 61 IN | SYSTOLIC BLOOD PRESSURE: 140 MMHG

## 2021-09-28 DIAGNOSIS — I10 ESSENTIAL HYPERTENSION: ICD-10-CM

## 2021-09-28 DIAGNOSIS — I70.202 ATHEROSCLEROSIS OF LEFT LEG (HCC): Primary | ICD-10-CM

## 2021-09-28 PROCEDURE — 99213 OFFICE O/P EST LOW 20 MIN: CPT | Performed by: INTERNAL MEDICINE

## 2021-09-28 RX ORDER — CHLORPROMAZINE HYDROCHLORIDE 50 MG/1
TABLET, FILM COATED ORAL
COMMUNITY
Start: 2021-07-26 | End: 2021-10-04 | Stop reason: HOSPADM

## 2021-09-28 RX ORDER — FLUTICASONE PROPIONATE AND SALMETEROL XINAFOATE 115; 21 UG/1; UG/1
2 AEROSOL, METERED RESPIRATORY (INHALATION) 2 TIMES DAILY
COMMUNITY
Start: 2021-09-14 | End: 2021-11-30

## 2021-09-28 RX ORDER — LIDOCAINE 50 MG/G
1 OINTMENT TOPICAL 3 TIMES DAILY
COMMUNITY
Start: 2021-08-06

## 2021-09-28 NOTE — PROGRESS NOTES
Cardiology Follow Up    Ingrid Conner  1985  [id number]  [facility] [place] [address] [address]  [phone] [phone]    1  Atherosclerosis of left leg ([address] )     2  Essential hypertension         Discussion/Summary:    PAD, failed stent, likely with medication noncompliance, now s/p lower extremity bypass  Preoperatively, she had testing with a stress test which was reported as abnormal, but I am suspicious of an artifact  Regardless, it was not a high risk stress test and she tolerated her surgery without complication  She is still recovering from her surgery  There is a history of inability to either get or take her medications  I would recommend continued medical management at this point from a cardiac standpoint  I have scheduled a 6 month follow up  If she is doing better from a vascular standpoint and we need to further evaluate anything from a cardiac standpoint, we could consider additional testing at that time  Smoking cessation is imperative  She has follow up with PCP in 2 days, and will call the vascular office today to get an appointment with the nursing staff at least for wound check, as she missed her appointment yesterday  History of Present Illness:       50-year-old female  I met her when she was in the hospital     She has a history of peripheral arterial disease  She had lower extremity peripheral intervention which failed, likely secondary to medication noncompliance  She ultimately had required bypass  I had seen her in preoperative evaluation for that  She had multiple complaints including pain, shortness of breath, chest discomfort  We did a pharmacologic nuclear stress test which did show a small reversible defect of the  Basal to mid anterior wall, but EF was normal, echo was unremarkable  Given overall area, I was more suspicious for artifact    We proceeded with medical management and she underwent her surgery without complication  Since leaving the hospital, she has actually been readmitted to [facility] with pain in her leg  She had a vascular surgery appointment yesterday which she missed  She says she is going to call the office to reschedule this today  She needs to get her staples removed  She is currently using a walker  She tells me she is compliant with her medications  Multiple stressors in social issues going on  She tells me she is being evicted, having issues with financial assurance  Not much support with being able to get to visits, etc     Tearful, anxious        Medical Problems     Problem List     Headache    Essential hypertension    Type 2 diabetes mellitus with diabetic polyneuropathy, with long-term current use of insulin ([facility])      Lab Results   Component Value Date    HGBA1C 8 5 (H) 09/08/2021         Paresthesia    COPD (chronic obstructive pulmonary disease) ([facility])    Nicotine dependence    Bipolar disorder ([facility])    Cellulitis of left lower extremity    Bilateral leg pain    PVD (peripheral vascular disease) ([facility])    Body mass index (BMI)40 0-44 9, adult ([facility] )    Bursitis of left knee    Superficial femoral artery occlusion ([facility])    Acute pain of left knee    Atherosclerosis of left leg ([facility])              Past Medical History:   Diagnosis Date    Asthma     [facility]     [facility]ch[facility] obstructive [facility]     Depression     Diabetes mellitus ([facility] )     Hypertension     Psychiatric disorder     cutting history    PTSD (post-traumatic stress disorder)     Tendonitis      Social History     Tobacco Use    Smoking status: Current Every Day Smoker     Packs/day: 2 00     Years: 17 00     Pack years: 34 00     Types: Cigarettes    Smokeless tobacco: Never Used    Tobacco comment: pt is down to 4 cigarettes a day    Vaping Use    Vaping Use: Never used   Substance Use Topics    Alcohol use: Not Currently     Alcohol/week: 1 0 standard drinks     Types: 1 Shots of liquor per week     Comment: 'I would drink whatever "    Drug use: Not Currently     Types: Cocaine, Marijuana     Comment: 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      Family History   Problem Relation Age of Onset    Hypertension Mother     Diabetes Mother     HIV Mother     Heart disease Mother     No Known Problems Father      Past Surgical History:   Procedure Laterality Date    BYPASS FEMORAL-POPLITEAL Left 2021    Procedure: Left Common Femoral Below Knee to Popliteal Bypass with Insitu GSV graft    Left Lower Extremity Angiogram;  Surgeon: Kit Hough MD;  Location: [facility];  Service: Vascular     SECTION      EAR SURGERY      IR LOWER EXTREMITY ANGIOGRAM  2021    IR LOWER EXTREMITY ANGIOGRAM  2021       Current Outpatient Medications:     acetaminophen (TYLENOL) 325 mg tablet, Take 2 tablets (650 mg total) by mouth every 6 (six) hours as needed for mild pain, Disp: , Rfl: 0    Advair -21 MCG/ACT inhaler, Inhale 2 puffs 2 (two) times a day Rinse mouth after use, Disp: , Rfl:     albuterol (2 5 mg/3 mL) 0 083 % nebulizer solution, TAKE 3 ML VIA NEBULIZER EVERY 6 (SIX) HOURS AS NEEDED FOR WHEEZING OR SHORTNESS OF BREATH, Disp: 150 mL, Rfl: 5    aspirin 81 mg chewable tablet, Chew 1 tablet (81 mg total) daily, Disp: 30 tablet, Rfl: 0    atorvastatin (LIPITOR) 40 mg tablet, , Disp: , Rfl:     BD Pen Needle Micro U/F 32G X 6 MM MISC, use 1 NEEDLE to inject MEDICATION subcutaneously twice a day, Disp: 100 each, Rfl: 0    Blood Glucose Monitoring Suppl (True Metrix Air Glucose Meter) w/Device KIT, use as directed, Disp: 1 kit, Rfl: 0    Blood Pressure Monitoring (Blood Pressure Monitor Automat) KATLYN, Use Daily as Directed, Disp: 1 Device, Rfl: 0    cephalexin (KEFLEX) 500 mg capsule, Take 1 capsule (500 mg total) by mouth every 6 (six) hours for 9 days, Disp: 36 capsule, Rfl: 0   chlorproMAZINE (THORAZINE) 100 mg tablet, Take 100 mg by mouth daily at bedtime, Disp: , Rfl:     chlorproMAZINE (THORAZINE) 50 mg tablet, TAKE ONE (1) TABLET BY MOUTH DAILY AT BEDTIME, Disp: , Rfl:     cyclobenzaprine (FLEXERIL) 5 mg tablet, Take 1 tablet (5 mg total) by mouth 3 (three) times a day, Disp: 45 tablet, Rfl: 0    Diclofenac Sodium (VOLTAREN) 1 %, APPLY 2 G TOPICALLY 4 (FOUR) TIMES A DAY, Disp: 100 g, Rfl: 1    Dulaglutide (Trulicity) 1 5 FM/1 9PO SOPN, Tony Roberto, Disp: 2 mL, Rfl: 0    gabapentin (NEURONTIN) 300 mg capsule, Take 1 capsule (300 mg total) by mouth 3 (three) times a day, Disp: 90 capsule, Rfl: 0    glucose blood test strip, Use one strip 3 times daily for blood glucose testing  Patient has True Metrix glucometer, Disp: 100 each, Rfl: 0    glucose monitoring kit (FREESTYLE) monitoring kit, Use 1 each daily Use one each daily to check blood sugars   Please refill with freestyle ed or with whichever brand is covered by insurance, Disp: 1 each, Rfl: 0    HYDROmorphone (DILAUDID) 2 mg tablet, Take 1 tablet (2 mg total) by mouth every 6 (six) hours as needed for severe pain for up to 10 daysMax Daily Amount: 8 mg, Disp: 10 tablet, Rfl: 0    hydrOXYzine HCL (ATARAX) 50 mg tablet, Take 50 mg by mouth 3 (three) times a day, Disp: , Rfl:     insulin glargine (Lantus SoloStar) 100 units/mL injection pen, Inject 40 Units under the skin daily, Disp: 18 mL, Rfl: 0    lamoTRIgine (LaMICtal) 25 mg tablet, TAKE ONE (1) TABLET BY MOUTH DAILY, Disp: 30 tablet, Rfl: 0    lidocaine (XYLOCAINE) 5 % ointment, Apply 1 application topically 3 (three) times a day, Disp: , Rfl:     lisinopril (ZESTRIL) 10 mg tablet, TAKE TWO (2) TABLETS BY MOUTH DAILY, Disp: 180 tablet, Rfl: 0    lurasidone (LATUDA) 80 mg tablet, Take 1 tablet (80 mg total) by mouth daily with breakfast, Disp: 30 tablet, Rfl: 0    metFORMIN (GLUCOPHAGE) 1000 MG tablet, TAKE ONE (1) TABLET BY MOUTH TWICE DAILY WITH FOOD, Disp: 60 tablet, Rfl: 0    nicotine (NICODERM CQ) 21 mg/24 hr TD 24 hr patch, Place 1 patch on the skin daily, Disp: 7 patch, Rfl: 0    prazosin (MINIPRESS) 1 mg capsule, Take 1 mg by mouth daily at bedtime, Disp: , Rfl:     QUEtiapine (SEROquel) 300 mg tablet, take 1 tablet by mouth once daily, Disp: 30 tablet, Rfl: 0    rivaroxaban (XARELTO) 2 5 mg tablet, Take 1 tablet (2 5 mg total) by mouth 2 (two) times a day with meals, Disp: 60 tablet, Rfl: 0    senna-docusate sodium (SENOKOT S) 8 6-50 mg per tablet, Take 2 tablets by mouth daily at bedtime, Disp: 30 tablet, Rfl: 0    traZODone (DESYREL) 100 mg tablet, TAKE 2 TABLETS BY MOUTH DAILY AT BEDTIME, Disp: 60 tablet, Rfl: 0    Current Facility-Administered Medications:     lidocaine (LMX) 4 % cream, , Topical, Daily PRN, Yenifer Alcala MD  No Known Allergies    Vitals:    09/28/21 1257   BP: 140/80   Pulse: 102   SpO2: 98%   Weight: 102 kg (224 lb)   Height: 5' 1" (1 549 m)     Vitals:    09/28/21 1257   Weight: 102 kg (224 lb)      Height: 5' 1" (154 9 cm)   Body mass index is 42 32 kg/m²  Physical Exam:  GENERAL: Alert, anxious, tearful  HEENT:  PERRL, EOMI, no scleral icterus, no conjunctival pallor  NECK:  Supple, No elevated JVP, no thyromegaly, no carotid bruits  HEART:  Regular rate and rhythm, normal S1/S2, no S3/S4, no murmur or rub  LUNGS:  Clear to auscultation bilaterally  ABDOMEN:  Obese, soft, non-tender, positive bowel sounds, no rebound or guarding  EXTREMITIES:  Post surgery    NEURO: No focal deficits,  SKIN: Normal without suspicious lesions on exposed skin      Labs:  Lab Results   Component Value Date    SODIUM 134 09/23/2021    K 3 7 09/23/2021    CL 99 09/23/2021    CREATININE 0 79 09/23/2021    BUN 12 09/23/2021    CO2 28 09/23/2021    ALT 23 09/23/2021    AST 14 (L) 09/23/2021    INR 0 96 09/23/2021    GLUF 126 (H) 09/09/2021    HGBA1C 8 5 (H) 09/08/2021    WBC 10 87 (H) 09/23/2021    HGB 8 1 (L) 09/23/2021    HCT 23 3 (L) 09/23/2021     (H) 09/23/2021       No results found for: CHOL  Lab Results   Component Value Date    LDLCALC 111 (H) 08/07/2020     Lab Results   Component Value Date    HDL 46 08/07/2020     Lab Results   Component Value Date    TRIG 139 08/07/2020       Testing:  Stress 910/21:  SUMMARY:  -  Stress results: There was no chest pain during stress  -  ECG conclusions: The stress ECG was non-diagnostic   -  Perfusion imaging: There was a small, moderately severe, reversible myocardial perfusion defect of the basal to mid anterior wall  -  Gated SPECT: The calculated left ventricular ejection fraction was 50 %  Left ventricular ejection fraction was within normal limits by visual estimate  There was no diagnostic evidence for left ventricular regional abnormality      IMPRESSIONS: Abnormal study after pharmacologic vasodilation without reproduction of symptoms  There was a small amount of ischemia in the distribution of the left anterior descending coronary artery  Left ventricular systolic function was  Normal     Echo 910/21:  LEFT VENTRICLE:  Systolic function was normal  Ejection fraction was estimated to be 60 %  There were no regional wall motion abnormalities  Wall thickness was mildly increased  There was mild concentric hypertrophy    Left ventricular diastolic function parameters were normal      RIGHT VENTRICLE:  The size was normal   Systolic function was normal

## 2021-09-29 LAB
BACTERIA BLD CULT: NORMAL
BACTERIA BLD CULT: NORMAL

## 2021-09-30 ENCOUNTER — OFFICE VISIT (OUTPATIENT)
Dept: FAMILY MEDICINE CLINIC | Facility: CLINIC | Age: 36
End: 2021-09-30
Payer: MEDICARE

## 2021-09-30 VITALS
SYSTOLIC BLOOD PRESSURE: 140 MMHG | TEMPERATURE: 98.4 F | OXYGEN SATURATION: 98 % | BODY MASS INDEX: 42.67 KG/M2 | HEART RATE: 100 BPM | WEIGHT: 226 LBS | HEIGHT: 61 IN | DIASTOLIC BLOOD PRESSURE: 80 MMHG

## 2021-09-30 DIAGNOSIS — E11.9 DIABETES (HCC): ICD-10-CM

## 2021-09-30 DIAGNOSIS — E11.42 TYPE 2 DIABETES MELLITUS WITH DIABETIC POLYNEUROPATHY, WITH LONG-TERM CURRENT USE OF INSULIN (HCC): Primary | ICD-10-CM

## 2021-09-30 DIAGNOSIS — Z79.4 TYPE 2 DIABETES MELLITUS WITH DIABETIC POLYNEUROPATHY, WITH LONG-TERM CURRENT USE OF INSULIN (HCC): Primary | ICD-10-CM

## 2021-09-30 DIAGNOSIS — F31.75 BIPOLAR DISORDER, IN PARTIAL REMISSION, MOST RECENT EPISODE DEPRESSED (HCC): ICD-10-CM

## 2021-09-30 DIAGNOSIS — E11.65 UNCONTROLLED TYPE 2 DIABETES MELLITUS WITH HYPERGLYCEMIA (HCC): ICD-10-CM

## 2021-09-30 PROCEDURE — 99213 OFFICE O/P EST LOW 20 MIN: CPT | Performed by: FAMILY MEDICINE

## 2021-09-30 RX ORDER — INSULIN GLARGINE 100 [IU]/ML
40 INJECTION, SOLUTION SUBCUTANEOUS DAILY
Qty: 18 ML | Refills: 0 | Status: SHIPPED | OUTPATIENT
Start: 2021-09-30 | End: 2021-11-17

## 2021-09-30 RX ORDER — DULAGLUTIDE 1.5 MG/.5ML
INJECTION, SOLUTION SUBCUTANEOUS
Qty: 2 ML | Refills: 0 | Status: SHIPPED | OUTPATIENT
Start: 2021-09-30 | End: 2021-10-27 | Stop reason: SDUPTHER

## 2021-10-01 ENCOUNTER — HOSPITAL ENCOUNTER (OUTPATIENT)
Facility: HOSPITAL | Age: 36
Setting detail: OBSERVATION
Discharge: HOME | End: 2021-10-04
Attending: EMERGENCY MEDICINE | Admitting: INTERNAL MEDICINE
Payer: MEDICARE

## 2021-10-01 DIAGNOSIS — Z95.828 S/P FEMORAL-POPLITEAL BYPASS SURGERY: ICD-10-CM

## 2021-10-01 DIAGNOSIS — L03.90 CELLULITIS: Primary | ICD-10-CM

## 2021-10-01 PROBLEM — M79.605 LEFT LEG PAIN: Status: ACTIVE | Noted: 2021-08-23

## 2021-10-01 PROCEDURE — 99285 EMERGENCY DEPT VISIT HI MDM: CPT | Performed by: EMERGENCY MEDICINE

## 2021-10-01 PROCEDURE — 99285 EMERGENCY DEPT VISIT HI MDM: CPT

## 2021-10-01 PROCEDURE — 93005 ELECTROCARDIOGRAM TRACING: CPT

## 2021-10-02 ENCOUNTER — APPOINTMENT (OUTPATIENT)
Dept: VASCULAR ULTRASOUND | Facility: HOSPITAL | Age: 36
End: 2021-10-02
Payer: MEDICARE

## 2021-10-02 PROBLEM — R79.89 POSITIVE D DIMER: Status: ACTIVE | Noted: 2021-10-02

## 2021-10-02 LAB
ALBUMIN SERPL BCP-MCNC: 3.2 G/DL (ref 3.5–5)
ALP SERPL-CCNC: 97 U/L (ref 46–116)
ALT SERPL W P-5'-P-CCNC: 21 U/L (ref 12–78)
ANION GAP SERPL CALCULATED.3IONS-SCNC: 10 MMOL/L (ref 4–13)
ANION GAP SERPL CALCULATED.3IONS-SCNC: 11 MMOL/L (ref 4–13)
APTT PPP: 42 SECONDS (ref 23–37)
AST SERPL W P-5'-P-CCNC: 17 U/L (ref 5–45)
BASOPHILS # BLD AUTO: 0.03 THOUSANDS/ΜL (ref 0–0.1)
BASOPHILS # BLD AUTO: 0.04 THOUSANDS/ΜL (ref 0–0.1)
BASOPHILS NFR BLD AUTO: 0 % (ref 0–1)
BASOPHILS NFR BLD AUTO: 1 % (ref 0–1)
BILIRUB SERPL-MCNC: 0.36 MG/DL (ref 0.2–1)
BUN SERPL-MCNC: 18 MG/DL (ref 5–25)
BUN SERPL-MCNC: 21 MG/DL (ref 5–25)
CALCIUM ALBUM COR SERPL-MCNC: 9 MG/DL (ref 8.3–10.1)
CALCIUM SERPL-MCNC: 8.1 MG/DL (ref 8.3–10.1)
CALCIUM SERPL-MCNC: 8.4 MG/DL (ref 8.3–10.1)
CHLORIDE SERPL-SCNC: 103 MMOL/L (ref 100–108)
CHLORIDE SERPL-SCNC: 105 MMOL/L (ref 100–108)
CO2 SERPL-SCNC: 24 MMOL/L (ref 21–32)
CO2 SERPL-SCNC: 24 MMOL/L (ref 21–32)
CREAT SERPL-MCNC: 0.79 MG/DL (ref 0.6–1.3)
CREAT SERPL-MCNC: 0.95 MG/DL (ref 0.6–1.3)
D DIMER PPP FEU-MCNC: 2.25 UG/ML FEU
EOSINOPHIL # BLD AUTO: 0.14 THOUSAND/ΜL (ref 0–0.61)
EOSINOPHIL # BLD AUTO: 0.34 THOUSAND/ΜL (ref 0–0.61)
EOSINOPHIL NFR BLD AUTO: 2 % (ref 0–6)
EOSINOPHIL NFR BLD AUTO: 4 % (ref 0–6)
ERYTHROCYTE [DISTWIDTH] IN BLOOD BY AUTOMATED COUNT: 13.8 % (ref 11.6–15.1)
ERYTHROCYTE [DISTWIDTH] IN BLOOD BY AUTOMATED COUNT: 14 % (ref 11.6–15.1)
GFR SERPL CREATININE-BSD FRML MDRD: 113 ML/MIN/1.73SQ M
GFR SERPL CREATININE-BSD FRML MDRD: 90 ML/MIN/1.73SQ M
GLUCOSE SERPL-MCNC: 115 MG/DL (ref 65–140)
GLUCOSE SERPL-MCNC: 149 MG/DL (ref 65–140)
GLUCOSE SERPL-MCNC: 154 MG/DL (ref 65–140)
GLUCOSE SERPL-MCNC: 157 MG/DL (ref 65–140)
GLUCOSE SERPL-MCNC: 165 MG/DL (ref 65–140)
GLUCOSE SERPL-MCNC: 235 MG/DL (ref 65–140)
HCT VFR BLD AUTO: 22.3 % (ref 34.8–46.1)
HCT VFR BLD AUTO: 23.1 % (ref 34.8–46.1)
HGB BLD-MCNC: 7.7 G/DL (ref 11.5–15.4)
HGB BLD-MCNC: 8.1 G/DL (ref 11.5–15.4)
IMM GRANULOCYTES # BLD AUTO: 0.03 THOUSAND/UL (ref 0–0.2)
IMM GRANULOCYTES # BLD AUTO: 0.04 THOUSAND/UL (ref 0–0.2)
IMM GRANULOCYTES NFR BLD AUTO: 0 % (ref 0–2)
IMM GRANULOCYTES NFR BLD AUTO: 1 % (ref 0–2)
INR PPP: 1.91 (ref 0.84–1.19)
LACTATE SERPL-SCNC: 0.8 MMOL/L (ref 0.5–2)
LYMPHOCYTES # BLD AUTO: 2.05 THOUSANDS/ΜL (ref 0.6–4.47)
LYMPHOCYTES # BLD AUTO: 2.82 THOUSANDS/ΜL (ref 0.6–4.47)
LYMPHOCYTES NFR BLD AUTO: 23 % (ref 14–44)
LYMPHOCYTES NFR BLD AUTO: 32 % (ref 14–44)
MCH RBC QN AUTO: 27.2 PG (ref 26.8–34.3)
MCH RBC QN AUTO: 27.6 PG (ref 26.8–34.3)
MCHC RBC AUTO-ENTMCNC: 34.5 G/DL (ref 31.4–37.4)
MCHC RBC AUTO-ENTMCNC: 35.1 G/DL (ref 31.4–37.4)
MCV RBC AUTO: 79 FL (ref 82–98)
MCV RBC AUTO: 79 FL (ref 82–98)
MONOCYTES # BLD AUTO: 0.64 THOUSAND/ΜL (ref 0.17–1.22)
MONOCYTES # BLD AUTO: 0.7 THOUSAND/ΜL (ref 0.17–1.22)
MONOCYTES NFR BLD AUTO: 7 % (ref 4–12)
MONOCYTES NFR BLD AUTO: 8 % (ref 4–12)
NEUTROPHILS # BLD AUTO: 5 THOUSANDS/ΜL (ref 1.85–7.62)
NEUTROPHILS # BLD AUTO: 6.05 THOUSANDS/ΜL (ref 1.85–7.62)
NEUTS SEG NFR BLD AUTO: 55 % (ref 43–75)
NEUTS SEG NFR BLD AUTO: 67 % (ref 43–75)
NRBC BLD AUTO-RTO: 0 /100 WBCS
NRBC BLD AUTO-RTO: 0 /100 WBCS
PLATELET # BLD AUTO: 484 THOUSANDS/UL (ref 149–390)
PLATELET # BLD AUTO: 496 THOUSANDS/UL (ref 149–390)
PMV BLD AUTO: 10.1 FL (ref 8.9–12.7)
PMV BLD AUTO: 9.7 FL (ref 8.9–12.7)
POTASSIUM SERPL-SCNC: 3.1 MMOL/L (ref 3.5–5.3)
POTASSIUM SERPL-SCNC: 3.3 MMOL/L (ref 3.5–5.3)
PROT SERPL-MCNC: 7.1 G/DL (ref 6.4–8.2)
PROTHROMBIN TIME: 21.6 SECONDS (ref 11.6–14.5)
RBC # BLD AUTO: 2.83 MILLION/UL (ref 3.81–5.12)
RBC # BLD AUTO: 2.93 MILLION/UL (ref 3.81–5.12)
SODIUM SERPL-SCNC: 138 MMOL/L (ref 136–145)
SODIUM SERPL-SCNC: 139 MMOL/L (ref 136–145)
WBC # BLD AUTO: 8.88 THOUSAND/UL (ref 4.31–10.16)
WBC # BLD AUTO: 9 THOUSAND/UL (ref 4.31–10.16)

## 2021-10-02 PROCEDURE — 96365 THER/PROPH/DIAG IV INF INIT: CPT

## 2021-10-02 PROCEDURE — 96375 TX/PRO/DX INJ NEW DRUG ADDON: CPT

## 2021-10-02 PROCEDURE — 82948 REAGENT STRIP/BLOOD GLUCOSE: CPT

## 2021-10-02 PROCEDURE — 96376 TX/PRO/DX INJ SAME DRUG ADON: CPT

## 2021-10-02 PROCEDURE — 85730 THROMBOPLASTIN TIME PARTIAL: CPT | Performed by: EMERGENCY MEDICINE

## 2021-10-02 PROCEDURE — 85025 COMPLETE CBC W/AUTO DIFF WBC: CPT | Performed by: PHYSICIAN ASSISTANT

## 2021-10-02 PROCEDURE — 93971 EXTREMITY STUDY: CPT

## 2021-10-02 PROCEDURE — 93971 EXTREMITY STUDY: CPT | Performed by: SURGERY

## 2021-10-02 PROCEDURE — 99220 PR INITIAL OBSERVATION CARE/DAY 70 MINUTES: CPT | Performed by: INTERNAL MEDICINE

## 2021-10-02 PROCEDURE — 85379 FIBRIN DEGRADATION QUANT: CPT | Performed by: EMERGENCY MEDICINE

## 2021-10-02 PROCEDURE — 85025 COMPLETE CBC W/AUTO DIFF WBC: CPT | Performed by: EMERGENCY MEDICINE

## 2021-10-02 PROCEDURE — 80053 COMPREHEN METABOLIC PANEL: CPT | Performed by: EMERGENCY MEDICINE

## 2021-10-02 PROCEDURE — 83605 ASSAY OF LACTIC ACID: CPT | Performed by: EMERGENCY MEDICINE

## 2021-10-02 PROCEDURE — 87040 BLOOD CULTURE FOR BACTERIA: CPT | Performed by: EMERGENCY MEDICINE

## 2021-10-02 PROCEDURE — 36415 COLL VENOUS BLD VENIPUNCTURE: CPT | Performed by: EMERGENCY MEDICINE

## 2021-10-02 PROCEDURE — 80048 BASIC METABOLIC PNL TOTAL CA: CPT | Performed by: PHYSICIAN ASSISTANT

## 2021-10-02 PROCEDURE — 85610 PROTHROMBIN TIME: CPT | Performed by: EMERGENCY MEDICINE

## 2021-10-02 RX ORDER — PRAZOSIN HYDROCHLORIDE 1 MG/1
1 CAPSULE ORAL
Status: DISCONTINUED | OUTPATIENT
Start: 2021-10-02 | End: 2021-10-04 | Stop reason: HOSPADM

## 2021-10-02 RX ORDER — ASPIRIN 81 MG/1
81 TABLET, CHEWABLE ORAL DAILY
Status: DISCONTINUED | OUTPATIENT
Start: 2021-10-02 | End: 2021-10-04 | Stop reason: HOSPADM

## 2021-10-02 RX ORDER — HYDROMORPHONE HYDROCHLORIDE 2 MG/1
1 TABLET ORAL EVERY 6 HOURS PRN
Status: DISCONTINUED | OUTPATIENT
Start: 2021-10-02 | End: 2021-10-04 | Stop reason: HOSPADM

## 2021-10-02 RX ORDER — LISINOPRIL 20 MG/1
20 TABLET ORAL DAILY
Status: DISCONTINUED | OUTPATIENT
Start: 2021-10-02 | End: 2021-10-04 | Stop reason: HOSPADM

## 2021-10-02 RX ORDER — CHLORPROMAZINE HYDROCHLORIDE 100 MG/1
100 TABLET, FILM COATED ORAL
Status: DISCONTINUED | OUTPATIENT
Start: 2021-10-02 | End: 2021-10-04 | Stop reason: HOSPADM

## 2021-10-02 RX ORDER — ATORVASTATIN CALCIUM 40 MG/1
40 TABLET, FILM COATED ORAL
Status: DISCONTINUED | OUTPATIENT
Start: 2021-10-02 | End: 2021-10-04 | Stop reason: HOSPADM

## 2021-10-02 RX ORDER — GABAPENTIN 300 MG/1
300 CAPSULE ORAL 3 TIMES DAILY
Status: DISCONTINUED | OUTPATIENT
Start: 2021-10-02 | End: 2021-10-04 | Stop reason: HOSPADM

## 2021-10-02 RX ORDER — VANCOMYCIN HYDROCHLORIDE 1 G/200ML
15 INJECTION, SOLUTION INTRAVENOUS EVERY 8 HOURS
Status: DISCONTINUED | OUTPATIENT
Start: 2021-10-02 | End: 2021-10-02

## 2021-10-02 RX ORDER — HYDROMORPHONE HCL/PF 1 MG/ML
0.5 SYRINGE (ML) INJECTION EVERY 4 HOURS PRN
Status: DISCONTINUED | OUTPATIENT
Start: 2021-10-02 | End: 2021-10-04

## 2021-10-02 RX ORDER — ALBUTEROL SULFATE 2.5 MG/3ML
2.5 SOLUTION RESPIRATORY (INHALATION) EVERY 6 HOURS PRN
Status: DISCONTINUED | OUTPATIENT
Start: 2021-10-02 | End: 2021-10-04 | Stop reason: HOSPADM

## 2021-10-02 RX ORDER — POTASSIUM CHLORIDE 20 MEQ/1
40 TABLET, EXTENDED RELEASE ORAL ONCE
Status: COMPLETED | OUTPATIENT
Start: 2021-10-02 | End: 2021-10-02

## 2021-10-02 RX ORDER — GABAPENTIN 300 MG/1
600 CAPSULE ORAL
Status: DISCONTINUED | OUTPATIENT
Start: 2021-10-02 | End: 2021-10-02

## 2021-10-02 RX ORDER — HYDROMORPHONE HYDROCHLORIDE 2 MG/1
2 TABLET ORAL EVERY 4 HOURS PRN
Status: DISCONTINUED | OUTPATIENT
Start: 2021-10-02 | End: 2021-10-04 | Stop reason: HOSPADM

## 2021-10-02 RX ORDER — AMOXICILLIN 250 MG
2 CAPSULE ORAL
Status: DISCONTINUED | OUTPATIENT
Start: 2021-10-02 | End: 2021-10-04 | Stop reason: HOSPADM

## 2021-10-02 RX ORDER — HYDROXYZINE HYDROCHLORIDE 25 MG/1
50 TABLET, FILM COATED ORAL 3 TIMES DAILY
Status: DISCONTINUED | OUTPATIENT
Start: 2021-10-02 | End: 2021-10-04 | Stop reason: HOSPADM

## 2021-10-02 RX ORDER — NICOTINE 21 MG/24HR
1 PATCH, TRANSDERMAL 24 HOURS TRANSDERMAL DAILY
Status: DISCONTINUED | OUTPATIENT
Start: 2021-10-02 | End: 2021-10-04 | Stop reason: HOSPADM

## 2021-10-02 RX ORDER — ONDANSETRON 2 MG/ML
4 INJECTION INTRAMUSCULAR; INTRAVENOUS EVERY 6 HOURS PRN
Status: DISCONTINUED | OUTPATIENT
Start: 2021-10-02 | End: 2021-10-04 | Stop reason: HOSPADM

## 2021-10-02 RX ORDER — INSULIN GLARGINE 100 [IU]/ML
40 INJECTION, SOLUTION SUBCUTANEOUS EVERY MORNING
Status: DISCONTINUED | OUTPATIENT
Start: 2021-10-02 | End: 2021-10-04 | Stop reason: HOSPADM

## 2021-10-02 RX ORDER — ALBUTEROL SULFATE 90 UG/1
2 AEROSOL, METERED RESPIRATORY (INHALATION) EVERY 6 HOURS PRN
Status: DISCONTINUED | OUTPATIENT
Start: 2021-10-02 | End: 2021-10-04 | Stop reason: HOSPADM

## 2021-10-02 RX ORDER — GABAPENTIN 300 MG/1
600 CAPSULE ORAL ONCE
Status: DISCONTINUED | OUTPATIENT
Start: 2021-10-02 | End: 2021-10-04 | Stop reason: HOSPADM

## 2021-10-02 RX ORDER — FLUTICASONE FUROATE AND VILANTEROL 100; 25 UG/1; UG/1
1 POWDER RESPIRATORY (INHALATION) DAILY
Status: DISCONTINUED | OUTPATIENT
Start: 2021-10-02 | End: 2021-10-04 | Stop reason: HOSPADM

## 2021-10-02 RX ADMIN — HYDROMORPHONE HYDROCHLORIDE 2 MG: 2 TABLET ORAL at 18:32

## 2021-10-02 RX ADMIN — HYDROMORPHONE HYDROCHLORIDE 0.5 MG: 1 INJECTION, SOLUTION INTRAMUSCULAR; INTRAVENOUS; SUBCUTANEOUS at 19:32

## 2021-10-02 RX ADMIN — GABAPENTIN 300 MG: 300 CAPSULE ORAL at 15:43

## 2021-10-02 RX ADMIN — DOCUSATE SODIUM AND SENNOSIDES 2 TABLET: 8.6; 5 TABLET, FILM COATED ORAL at 21:52

## 2021-10-02 RX ADMIN — NICOTINE 1 PATCH: 21 PATCH, EXTENDED RELEASE TRANSDERMAL at 09:55

## 2021-10-02 RX ADMIN — HYDROMORPHONE HYDROCHLORIDE 0.5 MG: 1 INJECTION, SOLUTION INTRAMUSCULAR; INTRAVENOUS; SUBCUTANEOUS at 23:57

## 2021-10-02 RX ADMIN — POTASSIUM CHLORIDE 40 MEQ: 1500 TABLET, EXTENDED RELEASE ORAL at 09:55

## 2021-10-02 RX ADMIN — MORPHINE SULFATE 2 MG: 2 INJECTION, SOLUTION INTRAMUSCULAR; INTRAVENOUS at 00:52

## 2021-10-02 RX ADMIN — HYDROMORPHONE HYDROCHLORIDE 0.5 MG: 1 INJECTION, SOLUTION INTRAMUSCULAR; INTRAVENOUS; SUBCUTANEOUS at 03:19

## 2021-10-02 RX ADMIN — HYDROXYZINE HYDROCHLORIDE 50 MG: 25 TABLET ORAL at 21:52

## 2021-10-02 RX ADMIN — HYDROXYZINE HYDROCHLORIDE 50 MG: 25 TABLET ORAL at 15:43

## 2021-10-02 RX ADMIN — RIVAROXABAN 2.5 MG: 2.5 TABLET, FILM COATED ORAL at 19:31

## 2021-10-02 RX ADMIN — HYDROXYZINE HYDROCHLORIDE 50 MG: 25 TABLET ORAL at 09:55

## 2021-10-02 RX ADMIN — CHLORPROMAZINE HYDROCHLORIDE 100 MG: 100 TABLET, FILM COATED ORAL at 21:52

## 2021-10-02 RX ADMIN — RIVAROXABAN 2.5 MG: 2.5 TABLET, FILM COATED ORAL at 09:57

## 2021-10-02 RX ADMIN — MORPHINE SULFATE 2 MG: 2 INJECTION, SOLUTION INTRAMUSCULAR; INTRAVENOUS at 01:30

## 2021-10-02 RX ADMIN — HYDROMORPHONE HYDROCHLORIDE 2 MG: 2 TABLET ORAL at 23:02

## 2021-10-02 RX ADMIN — GABAPENTIN 300 MG: 300 CAPSULE ORAL at 21:51

## 2021-10-02 RX ADMIN — HYDROMORPHONE HYDROCHLORIDE 2 MG: 2 TABLET ORAL at 05:44

## 2021-10-02 RX ADMIN — INSULIN GLARGINE 40 UNITS: 100 INJECTION, SOLUTION SUBCUTANEOUS at 09:54

## 2021-10-02 RX ADMIN — VANCOMYCIN HYDROCHLORIDE 1500 MG: 5 INJECTION, POWDER, LYOPHILIZED, FOR SOLUTION INTRAVENOUS at 01:52

## 2021-10-02 RX ADMIN — HYDROMORPHONE HYDROCHLORIDE 0.5 MG: 1 INJECTION, SOLUTION INTRAMUSCULAR; INTRAVENOUS; SUBCUTANEOUS at 07:23

## 2021-10-02 RX ADMIN — GABAPENTIN 300 MG: 300 CAPSULE ORAL at 09:55

## 2021-10-02 RX ADMIN — LURASIDONE HYDROCHLORIDE 80 MG: 80 TABLET, FILM COATED ORAL at 09:54

## 2021-10-02 RX ADMIN — LISINOPRIL 20 MG: 20 TABLET ORAL at 09:55

## 2021-10-02 RX ADMIN — ASPIRIN 81 MG CHEWABLE TABLET 81 MG: 81 TABLET CHEWABLE at 09:55

## 2021-10-02 RX ADMIN — PRAZOSIN HYDROCHLORIDE 1 MG: 1 CAPSULE ORAL at 21:52

## 2021-10-02 RX ADMIN — FLUTICASONE FUROATE AND VILANTEROL TRIFENATATE 1 PUFF: 100; 25 POWDER RESPIRATORY (INHALATION) at 09:54

## 2021-10-02 RX ADMIN — ATORVASTATIN CALCIUM 40 MG: 40 TABLET, FILM COATED ORAL at 15:43

## 2021-10-03 PROBLEM — M79.606 LEG PAIN: Status: ACTIVE | Noted: 2020-09-22

## 2021-10-03 LAB
ANION GAP SERPL CALCULATED.3IONS-SCNC: 6 MMOL/L (ref 4–13)
BUN SERPL-MCNC: 21 MG/DL (ref 5–25)
CALCIUM SERPL-MCNC: 8.1 MG/DL (ref 8.3–10.1)
CHLORIDE SERPL-SCNC: 105 MMOL/L (ref 100–108)
CO2 SERPL-SCNC: 27 MMOL/L (ref 21–32)
CREAT SERPL-MCNC: 0.74 MG/DL (ref 0.6–1.3)
ERYTHROCYTE [DISTWIDTH] IN BLOOD BY AUTOMATED COUNT: 14 % (ref 11.6–15.1)
GFR SERPL CREATININE-BSD FRML MDRD: 122 ML/MIN/1.73SQ M
GLUCOSE P FAST SERPL-MCNC: 129 MG/DL (ref 65–99)
GLUCOSE SERPL-MCNC: 129 MG/DL (ref 65–140)
GLUCOSE SERPL-MCNC: 129 MG/DL (ref 65–140)
GLUCOSE SERPL-MCNC: 140 MG/DL (ref 65–140)
GLUCOSE SERPL-MCNC: 192 MG/DL (ref 65–140)
GLUCOSE SERPL-MCNC: 193 MG/DL (ref 65–140)
GLUCOSE SERPL-MCNC: 220 MG/DL (ref 65–140)
HCT VFR BLD AUTO: 23.2 % (ref 34.8–46.1)
HGB BLD-MCNC: 7.9 G/DL (ref 11.5–15.4)
MCH RBC QN AUTO: 27.1 PG (ref 26.8–34.3)
MCHC RBC AUTO-ENTMCNC: 34.1 G/DL (ref 31.4–37.4)
MCV RBC AUTO: 80 FL (ref 82–98)
PLATELET # BLD AUTO: 438 THOUSANDS/UL (ref 149–390)
PMV BLD AUTO: 9.5 FL (ref 8.9–12.7)
POTASSIUM SERPL-SCNC: 3.9 MMOL/L (ref 3.5–5.3)
RBC # BLD AUTO: 2.91 MILLION/UL (ref 3.81–5.12)
SODIUM SERPL-SCNC: 138 MMOL/L (ref 136–145)
WBC # BLD AUTO: 4.9 THOUSAND/UL (ref 4.31–10.16)

## 2021-10-03 PROCEDURE — 85027 COMPLETE CBC AUTOMATED: CPT | Performed by: FAMILY MEDICINE

## 2021-10-03 PROCEDURE — 82948 REAGENT STRIP/BLOOD GLUCOSE: CPT

## 2021-10-03 PROCEDURE — 99024 POSTOP FOLLOW-UP VISIT: CPT | Performed by: PHYSICIAN ASSISTANT

## 2021-10-03 PROCEDURE — 80048 BASIC METABOLIC PNL TOTAL CA: CPT | Performed by: FAMILY MEDICINE

## 2021-10-03 PROCEDURE — 99225 PR SBSQ OBSERVATION CARE/DAY 25 MINUTES: CPT | Performed by: INTERNAL MEDICINE

## 2021-10-03 RX ORDER — OXYCODONE HYDROCHLORIDE 5 MG/1
5 TABLET ORAL EVERY 6 HOURS PRN
Qty: 10 TABLET | Refills: 0 | Status: SHIPPED | OUTPATIENT
Start: 2021-10-03 | End: 2021-10-05 | Stop reason: SDUPTHER

## 2021-10-03 RX ORDER — ACETAMINOPHEN 325 MG/1
650 TABLET ORAL EVERY 6 HOURS PRN
Status: DISCONTINUED | OUTPATIENT
Start: 2021-10-03 | End: 2021-10-04 | Stop reason: HOSPADM

## 2021-10-03 RX ADMIN — HYDROMORPHONE HYDROCHLORIDE 2 MG: 2 TABLET ORAL at 14:29

## 2021-10-03 RX ADMIN — HYDROMORPHONE HYDROCHLORIDE 0.5 MG: 1 INJECTION, SOLUTION INTRAMUSCULAR; INTRAVENOUS; SUBCUTANEOUS at 09:18

## 2021-10-03 RX ADMIN — HYDROMORPHONE HYDROCHLORIDE 0.5 MG: 1 INJECTION, SOLUTION INTRAMUSCULAR; INTRAVENOUS; SUBCUTANEOUS at 18:33

## 2021-10-03 RX ADMIN — LISINOPRIL 20 MG: 20 TABLET ORAL at 09:15

## 2021-10-03 RX ADMIN — HYDROMORPHONE HYDROCHLORIDE 2 MG: 2 TABLET ORAL at 21:45

## 2021-10-03 RX ADMIN — DOCUSATE SODIUM AND SENNOSIDES 2 TABLET: 8.6; 5 TABLET, FILM COATED ORAL at 21:45

## 2021-10-03 RX ADMIN — INSULIN GLARGINE 40 UNITS: 100 INJECTION, SOLUTION SUBCUTANEOUS at 09:16

## 2021-10-03 RX ADMIN — HYDROMORPHONE HYDROCHLORIDE 0.5 MG: 1 INJECTION, SOLUTION INTRAMUSCULAR; INTRAVENOUS; SUBCUTANEOUS at 13:34

## 2021-10-03 RX ADMIN — ACETAMINOPHEN 650 MG: 325 TABLET ORAL at 20:08

## 2021-10-03 RX ADMIN — HYDROXYZINE HYDROCHLORIDE 50 MG: 25 TABLET ORAL at 09:15

## 2021-10-03 RX ADMIN — HYDROMORPHONE HYDROCHLORIDE 2 MG: 2 TABLET ORAL at 09:51

## 2021-10-03 RX ADMIN — GABAPENTIN 300 MG: 300 CAPSULE ORAL at 16:54

## 2021-10-03 RX ADMIN — INSULIN LISPRO 1 UNITS: 100 INJECTION, SOLUTION INTRAVENOUS; SUBCUTANEOUS at 14:29

## 2021-10-03 RX ADMIN — PRAZOSIN HYDROCHLORIDE 1 MG: 1 CAPSULE ORAL at 21:45

## 2021-10-03 RX ADMIN — HYDROXYZINE HYDROCHLORIDE 50 MG: 25 TABLET ORAL at 21:45

## 2021-10-03 RX ADMIN — HYDROMORPHONE HYDROCHLORIDE 2 MG: 2 TABLET ORAL at 16:54

## 2021-10-03 RX ADMIN — GABAPENTIN 300 MG: 300 CAPSULE ORAL at 21:45

## 2021-10-03 RX ADMIN — INSULIN LISPRO 1 UNITS: 100 INJECTION, SOLUTION INTRAVENOUS; SUBCUTANEOUS at 16:57

## 2021-10-03 RX ADMIN — HYDROXYZINE HYDROCHLORIDE 50 MG: 25 TABLET ORAL at 16:54

## 2021-10-03 RX ADMIN — ATORVASTATIN CALCIUM 40 MG: 40 TABLET, FILM COATED ORAL at 16:54

## 2021-10-03 RX ADMIN — LURASIDONE HYDROCHLORIDE 80 MG: 80 TABLET, FILM COATED ORAL at 09:16

## 2021-10-03 RX ADMIN — RIVAROXABAN 2.5 MG: 2.5 TABLET, FILM COATED ORAL at 16:54

## 2021-10-03 RX ADMIN — NICOTINE 1 PATCH: 21 PATCH, EXTENDED RELEASE TRANSDERMAL at 09:18

## 2021-10-03 RX ADMIN — RIVAROXABAN 2.5 MG: 2.5 TABLET, FILM COATED ORAL at 09:16

## 2021-10-03 RX ADMIN — HYDROMORPHONE HYDROCHLORIDE 2 MG: 2 TABLET ORAL at 05:35

## 2021-10-03 RX ADMIN — CHLORPROMAZINE HYDROCHLORIDE 100 MG: 100 TABLET, FILM COATED ORAL at 21:45

## 2021-10-03 RX ADMIN — ASPIRIN 81 MG CHEWABLE TABLET 81 MG: 81 TABLET CHEWABLE at 09:15

## 2021-10-03 RX ADMIN — GABAPENTIN 300 MG: 300 CAPSULE ORAL at 09:15

## 2021-10-04 VITALS
RESPIRATION RATE: 16 BRPM | WEIGHT: 220.46 LBS | HEART RATE: 92 BPM | BODY MASS INDEX: 41.62 KG/M2 | HEIGHT: 61 IN | SYSTOLIC BLOOD PRESSURE: 98 MMHG | TEMPERATURE: 98.6 F | OXYGEN SATURATION: 100 % | DIASTOLIC BLOOD PRESSURE: 63 MMHG

## 2021-10-04 LAB — GLUCOSE SERPL-MCNC: 235 MG/DL (ref 65–140)

## 2021-10-04 PROCEDURE — 99217 PR OBSERVATION CARE DISCHARGE MANAGEMENT: CPT | Performed by: INTERNAL MEDICINE

## 2021-10-04 PROCEDURE — 82948 REAGENT STRIP/BLOOD GLUCOSE: CPT

## 2021-10-04 RX ADMIN — HYDROMORPHONE HYDROCHLORIDE 2 MG: 2 TABLET ORAL at 06:06

## 2021-10-04 RX ADMIN — LURASIDONE HYDROCHLORIDE 80 MG: 80 TABLET, FILM COATED ORAL at 07:33

## 2021-10-04 RX ADMIN — INSULIN LISPRO 2 UNITS: 100 INJECTION, SOLUTION INTRAVENOUS; SUBCUTANEOUS at 07:34

## 2021-10-04 RX ADMIN — GABAPENTIN 300 MG: 300 CAPSULE ORAL at 09:09

## 2021-10-04 RX ADMIN — RIVAROXABAN 2.5 MG: 2.5 TABLET, FILM COATED ORAL at 07:33

## 2021-10-04 RX ADMIN — INSULIN GLARGINE 40 UNITS: 100 INJECTION, SOLUTION SUBCUTANEOUS at 09:09

## 2021-10-04 RX ADMIN — FLUTICASONE FUROATE AND VILANTEROL TRIFENATATE 1 PUFF: 100; 25 POWDER RESPIRATORY (INHALATION) at 09:11

## 2021-10-04 RX ADMIN — HYDROXYZINE HYDROCHLORIDE 50 MG: 25 TABLET ORAL at 09:10

## 2021-10-04 RX ADMIN — ASPIRIN 81 MG CHEWABLE TABLET 81 MG: 81 TABLET CHEWABLE at 09:10

## 2021-10-04 RX ADMIN — NICOTINE 1 PATCH: 21 PATCH, EXTENDED RELEASE TRANSDERMAL at 09:10

## 2021-10-05 ENCOUNTER — HOSPITAL ENCOUNTER (EMERGENCY)
Facility: HOSPITAL | Age: 36
Discharge: HOME | End: 2021-10-05
Attending: EMERGENCY MEDICINE
Payer: MEDICARE

## 2021-10-05 VITALS
BODY MASS INDEX: 44.41 KG/M2 | DIASTOLIC BLOOD PRESSURE: 76 MMHG | TEMPERATURE: 98.6 F | SYSTOLIC BLOOD PRESSURE: 134 MMHG | WEIGHT: 235.23 LBS | OXYGEN SATURATION: 100 % | HEIGHT: 61 IN | HEART RATE: 105 BPM | RESPIRATION RATE: 18 BRPM

## 2021-10-05 DIAGNOSIS — M79.89 LEG SWELLING: Primary | ICD-10-CM

## 2021-10-05 LAB
ATRIAL RATE: 104 BPM
P AXIS: 66 DEGREES
PR INTERVAL: 142 MS
QRS AXIS: 61 DEGREES
QRSD INTERVAL: 80 MS
QT INTERVAL: 346 MS
QTC INTERVAL: 454 MS
T WAVE AXIS: 27 DEGREES
VENTRICULAR RATE: 104 BPM

## 2021-10-05 PROCEDURE — 93010 ELECTROCARDIOGRAM REPORT: CPT | Performed by: INTERNAL MEDICINE

## 2021-10-05 PROCEDURE — 99283 EMERGENCY DEPT VISIT LOW MDM: CPT

## 2021-10-05 PROCEDURE — 99284 EMERGENCY DEPT VISIT MOD MDM: CPT | Performed by: STUDENT IN AN ORGANIZED HEALTH CARE EDUCATION/TRAINING PROGRAM

## 2021-10-05 RX ORDER — OXYCODONE HYDROCHLORIDE AND ACETAMINOPHEN 5; 325 MG/1; MG/1
1 TABLET ORAL ONCE
Status: COMPLETED | OUTPATIENT
Start: 2021-10-05 | End: 2021-10-05

## 2021-10-05 RX ORDER — OXYCODONE HYDROCHLORIDE 5 MG/1
5 TABLET ORAL EVERY 6 HOURS PRN
Qty: 10 TABLET | Refills: 0 | Status: SHIPPED | OUTPATIENT
Start: 2021-10-05 | End: 2021-10-28 | Stop reason: ALTCHOICE

## 2021-10-05 RX ADMIN — OXYCODONE HYDROCHLORIDE AND ACETAMINOPHEN 1 TABLET: 5; 325 TABLET ORAL at 02:04

## 2021-10-07 ENCOUNTER — TELEPHONE (OUTPATIENT)
Dept: GYNECOLOGIC ONCOLOGY | Facility: CLINIC | Age: 36
End: 2021-10-07

## 2021-10-07 LAB
BACTERIA BLD CULT: NORMAL
BACTERIA BLD CULT: NORMAL

## 2021-10-11 ENCOUNTER — EPISODE CHANGES (OUTPATIENT)
Dept: CASE MANAGEMENT | Facility: OTHER | Age: 36
End: 2021-10-11

## 2021-10-11 ENCOUNTER — PATIENT OUTREACH (OUTPATIENT)
Dept: FAMILY MEDICINE CLINIC | Facility: CLINIC | Age: 36
End: 2021-10-11

## 2021-10-11 ENCOUNTER — OFFICE VISIT (OUTPATIENT)
Dept: VASCULAR SURGERY | Facility: CLINIC | Age: 36
End: 2021-10-11

## 2021-10-11 VITALS
WEIGHT: 225 LBS | SYSTOLIC BLOOD PRESSURE: 138 MMHG | DIASTOLIC BLOOD PRESSURE: 66 MMHG | RESPIRATION RATE: 20 BRPM | HEIGHT: 61 IN | BODY MASS INDEX: 42.48 KG/M2 | HEART RATE: 92 BPM

## 2021-10-11 DIAGNOSIS — Z98.890 POSTOPERATIVE STATE: ICD-10-CM

## 2021-10-11 DIAGNOSIS — I73.9 PAD (PERIPHERAL ARTERY DISEASE) (HCC): Primary | ICD-10-CM

## 2021-10-11 PROCEDURE — 99024 POSTOP FOLLOW-UP VISIT: CPT | Performed by: PHYSICIAN ASSISTANT

## 2021-10-11 RX ORDER — OXYCODONE HYDROCHLORIDE AND ACETAMINOPHEN 5; 325 MG/1; MG/1
1 TABLET ORAL EVERY 6 HOURS PRN
Qty: 16 TABLET | Refills: 0 | Status: SHIPPED | OUTPATIENT
Start: 2021-10-11 | End: 2021-10-21

## 2021-10-12 DIAGNOSIS — E11.9 DIABETES (HCC): ICD-10-CM

## 2021-10-13 ENCOUNTER — TELEPHONE (OUTPATIENT)
Dept: HEMATOLOGY ONCOLOGY | Facility: CLINIC | Age: 36
End: 2021-10-13

## 2021-10-13 ENCOUNTER — PATIENT OUTREACH (OUTPATIENT)
Dept: FAMILY MEDICINE CLINIC | Facility: CLINIC | Age: 36
End: 2021-10-13

## 2021-10-14 ENCOUNTER — PATIENT OUTREACH (OUTPATIENT)
Dept: FAMILY MEDICINE CLINIC | Facility: CLINIC | Age: 36
End: 2021-10-14

## 2021-10-18 ENCOUNTER — PATIENT OUTREACH (OUTPATIENT)
Dept: CASE MANAGEMENT | Facility: OTHER | Age: 36
End: 2021-10-18

## 2021-10-25 ENCOUNTER — PATIENT OUTREACH (OUTPATIENT)
Dept: FAMILY MEDICINE CLINIC | Facility: CLINIC | Age: 36
End: 2021-10-25

## 2021-10-26 ENCOUNTER — TELEPHONE (OUTPATIENT)
Dept: VASCULAR SURGERY | Facility: CLINIC | Age: 36
End: 2021-10-26

## 2021-10-27 ENCOUNTER — PATIENT OUTREACH (OUTPATIENT)
Dept: FAMILY MEDICINE CLINIC | Facility: CLINIC | Age: 36
End: 2021-10-27

## 2021-10-27 ENCOUNTER — HOSPITAL ENCOUNTER (OUTPATIENT)
Dept: NON INVASIVE DIAGNOSTICS | Facility: CLINIC | Age: 36
Discharge: HOME | End: 2021-10-27
Payer: MEDICARE

## 2021-10-27 DIAGNOSIS — I73.9 PAD (PERIPHERAL ARTERY DISEASE) (HCC): ICD-10-CM

## 2021-10-27 DIAGNOSIS — E11.65 UNCONTROLLED TYPE 2 DIABETES MELLITUS WITH HYPERGLYCEMIA (HCC): ICD-10-CM

## 2021-10-27 DIAGNOSIS — E11.9 DIABETES (HCC): ICD-10-CM

## 2021-10-27 PROCEDURE — 93925 LOWER EXTREMITY STUDY: CPT

## 2021-10-27 PROCEDURE — 93923 UPR/LXTR ART STDY 3+ LVLS: CPT

## 2021-10-27 PROCEDURE — 93925 LOWER EXTREMITY STUDY: CPT | Performed by: SURGERY

## 2021-10-27 PROCEDURE — 93922 UPR/L XTREMITY ART 2 LEVELS: CPT | Performed by: SURGERY

## 2021-10-28 ENCOUNTER — PATIENT OUTREACH (OUTPATIENT)
Dept: FAMILY MEDICINE CLINIC | Facility: CLINIC | Age: 36
End: 2021-10-28

## 2021-10-28 ENCOUNTER — OFFICE VISIT (OUTPATIENT)
Dept: VASCULAR SURGERY | Facility: CLINIC | Age: 36
End: 2021-10-28

## 2021-10-28 ENCOUNTER — TELEPHONE (OUTPATIENT)
Dept: ADMINISTRATIVE | Facility: HOSPITAL | Age: 36
End: 2021-10-28

## 2021-10-28 VITALS
TEMPERATURE: 98.5 F | DIASTOLIC BLOOD PRESSURE: 76 MMHG | HEART RATE: 98 BPM | HEIGHT: 61 IN | WEIGHT: 225 LBS | SYSTOLIC BLOOD PRESSURE: 138 MMHG | BODY MASS INDEX: 42.48 KG/M2

## 2021-10-28 DIAGNOSIS — I73.9 PAD (PERIPHERAL ARTERY DISEASE) (HCC): ICD-10-CM

## 2021-10-28 DIAGNOSIS — T81.31XS SURGICAL WOUND BREAKDOWN, SEQUELA: ICD-10-CM

## 2021-10-28 DIAGNOSIS — M79.672 FOOT PAIN, LEFT: Primary | ICD-10-CM

## 2021-10-28 DIAGNOSIS — G60.9 IDIOPATHIC PERIPHERAL NEUROPATHY: ICD-10-CM

## 2021-10-28 PROCEDURE — 99024 POSTOP FOLLOW-UP VISIT: CPT | Performed by: SURGERY

## 2021-10-28 RX ORDER — HYDROXYZINE PAMOATE 50 MG/1
CAPSULE ORAL
COMMUNITY
Start: 2021-10-22

## 2021-10-28 RX ORDER — PREGABALIN 50 MG/1
50 CAPSULE ORAL 3 TIMES DAILY
Qty: 270 CAPSULE | Refills: 0 | Status: SHIPPED | OUTPATIENT
Start: 2021-10-28 | End: 2022-01-28

## 2021-10-28 RX ORDER — CHLORPROMAZINE HYDROCHLORIDE 50 MG/1
50 TABLET, FILM COATED ORAL
COMMUNITY
Start: 2021-10-22

## 2021-10-28 RX ORDER — OXYCODONE HYDROCHLORIDE AND ACETAMINOPHEN 5; 325 MG/1; MG/1
1 TABLET ORAL EVERY 8 HOURS PRN
Qty: 20 TABLET | Refills: 0 | Status: SHIPPED | OUTPATIENT
Start: 2021-10-28 | End: 2021-11-17

## 2021-10-31 DIAGNOSIS — Z79.4 TYPE 2 DIABETES MELLITUS WITH DIABETIC POLYNEUROPATHY, WITH LONG-TERM CURRENT USE OF INSULIN (HCC): ICD-10-CM

## 2021-10-31 DIAGNOSIS — M25.562 ACUTE PAIN OF LEFT KNEE: Primary | ICD-10-CM

## 2021-10-31 DIAGNOSIS — E11.42 TYPE 2 DIABETES MELLITUS WITH DIABETIC POLYNEUROPATHY, WITH LONG-TERM CURRENT USE OF INSULIN (HCC): ICD-10-CM

## 2021-10-31 RX ORDER — DULAGLUTIDE 1.5 MG/.5ML
INJECTION, SOLUTION SUBCUTANEOUS
Qty: 2 ML | Refills: 0 | Status: SHIPPED | OUTPATIENT
Start: 2021-10-31

## 2021-10-31 RX ORDER — CALCIUM CITRATE/VITAMIN D3 200MG-6.25
TABLET ORAL
Qty: 100 EACH | Refills: 0 | Status: SHIPPED | OUTPATIENT
Start: 2021-10-31 | End: 2021-12-03

## 2021-11-02 ENCOUNTER — PATIENT OUTREACH (OUTPATIENT)
Dept: FAMILY MEDICINE CLINIC | Facility: CLINIC | Age: 36
End: 2021-11-02

## 2021-11-02 DIAGNOSIS — M79.672 LEFT FOOT PAIN: ICD-10-CM

## 2021-11-08 DIAGNOSIS — J44.9 CHRONIC OBSTRUCTIVE PULMONARY DISEASE, UNSPECIFIED COPD TYPE (HCC): Primary | ICD-10-CM

## 2021-11-09 RX ORDER — ALBUTEROL SULFATE 90 UG/1
AEROSOL, METERED RESPIRATORY (INHALATION)
Qty: 8.5 G | Refills: 5 | Status: SHIPPED | OUTPATIENT
Start: 2021-11-09 | End: 2022-02-21

## 2021-11-12 ENCOUNTER — TELEPHONE (OUTPATIENT)
Dept: PULMONOLOGY | Facility: CLINIC | Age: 36
End: 2021-11-12

## 2021-11-14 ENCOUNTER — NURSE TRIAGE (OUTPATIENT)
Dept: OTHER | Facility: OTHER | Age: 36
End: 2021-11-14

## 2021-11-15 ENCOUNTER — HOSPITAL ENCOUNTER (EMERGENCY)
Facility: HOSPITAL | Age: 36
Discharge: HOME | End: 2021-11-15
Attending: EMERGENCY MEDICINE
Payer: MEDICARE

## 2021-11-15 VITALS
RESPIRATION RATE: 18 BRPM | TEMPERATURE: 98 F | OXYGEN SATURATION: 100 % | SYSTOLIC BLOOD PRESSURE: 142 MMHG | DIASTOLIC BLOOD PRESSURE: 97 MMHG | HEART RATE: 103 BPM

## 2021-11-15 DIAGNOSIS — M79.605 LEFT LEG PAIN: Primary | ICD-10-CM

## 2021-11-15 DIAGNOSIS — I73.9 PVD (PERIPHERAL VASCULAR DISEASE) (HCC): ICD-10-CM

## 2021-11-15 LAB
ALBUMIN SERPL BCP-MCNC: 3.8 G/DL (ref 3.4–4.8)
ALP SERPL-CCNC: 86.4 U/L (ref 35–140)
ALT SERPL W P-5'-P-CCNC: 14 U/L (ref 5–54)
ANION GAP SERPL CALCULATED.3IONS-SCNC: 8 MMOL/L (ref 4–13)
AST SERPL W P-5'-P-CCNC: 9 U/L (ref 15–41)
BASOPHILS # BLD AUTO: 0.02 THOUSANDS/ΜL (ref 0–0.1)
BASOPHILS NFR BLD AUTO: 0 % (ref 0–1)
BILIRUB SERPL-MCNC: 0.23 MG/DL (ref 0.3–1.2)
BUN SERPL-MCNC: 16 MG/DL (ref 6–20)
CALCIUM SERPL-MCNC: 9.1 MG/DL (ref 8.4–10.2)
CHLORIDE SERPL-SCNC: 101 MMOL/L (ref 96–108)
CO2 SERPL-SCNC: 28 MMOL/L (ref 22–33)
CREAT SERPL-MCNC: 1.06 MG/DL (ref 0.4–1.1)
EOSINOPHIL # BLD AUTO: 0.09 THOUSAND/ΜL (ref 0–0.61)
EOSINOPHIL NFR BLD AUTO: 1 % (ref 0–6)
ERYTHROCYTE [DISTWIDTH] IN BLOOD BY AUTOMATED COUNT: 14.6 % (ref 11.6–15.1)
GFR SERPL CREATININE-BSD FRML MDRD: 79 ML/MIN/1.73SQ M
GLUCOSE SERPL-MCNC: 258 MG/DL (ref 65–140)
HCT VFR BLD AUTO: 31.9 % (ref 34.8–46.1)
HGB BLD-MCNC: 11.5 G/DL (ref 11.5–15.4)
IMM GRANULOCYTES # BLD AUTO: 0 THOUSAND/UL (ref 0–0.2)
IMM GRANULOCYTES NFR BLD AUTO: 0 % (ref 0–2)
LYMPHOCYTES # BLD AUTO: 2.24 THOUSANDS/ΜL (ref 0.6–4.47)
LYMPHOCYTES NFR BLD AUTO: 29 % (ref 14–44)
MCH RBC QN AUTO: 25.4 PG (ref 26.8–34.3)
MCHC RBC AUTO-ENTMCNC: 36.1 G/DL (ref 31.4–37.4)
MCV RBC AUTO: 71 FL (ref 82–98)
MONOCYTES # BLD AUTO: 0.6 THOUSAND/ΜL (ref 0.17–1.22)
MONOCYTES NFR BLD AUTO: 8 % (ref 4–12)
NEUTROPHILS # BLD AUTO: 4.86 THOUSANDS/ΜL (ref 1.85–7.62)
NEUTS SEG NFR BLD AUTO: 62 % (ref 43–75)
PLATELET # BLD AUTO: 413 THOUSANDS/UL (ref 149–390)
PMV BLD AUTO: 10.6 FL (ref 8.9–12.7)
POTASSIUM SERPL-SCNC: 3.4 MMOL/L (ref 3.5–5)
PROT SERPL-MCNC: 7.2 G/DL (ref 6.4–8.3)
RBC # BLD AUTO: 4.52 MILLION/UL (ref 3.81–5.12)
SODIUM SERPL-SCNC: 137 MMOL/L (ref 133–145)
WBC # BLD AUTO: 7.81 THOUSAND/UL (ref 4.31–10.16)

## 2021-11-15 PROCEDURE — 85025 COMPLETE CBC W/AUTO DIFF WBC: CPT | Performed by: EMERGENCY MEDICINE

## 2021-11-15 PROCEDURE — 99283 EMERGENCY DEPT VISIT LOW MDM: CPT

## 2021-11-15 PROCEDURE — 36415 COLL VENOUS BLD VENIPUNCTURE: CPT | Performed by: EMERGENCY MEDICINE

## 2021-11-15 PROCEDURE — 80053 COMPREHEN METABOLIC PANEL: CPT | Performed by: EMERGENCY MEDICINE

## 2021-11-15 PROCEDURE — 99284 EMERGENCY DEPT VISIT MOD MDM: CPT | Performed by: EMERGENCY MEDICINE

## 2021-11-16 DIAGNOSIS — E11.65 UNCONTROLLED TYPE 2 DIABETES MELLITUS WITH HYPERGLYCEMIA (HCC): ICD-10-CM

## 2021-11-17 RX ORDER — INSULIN GLARGINE 100 [IU]/ML
40 INJECTION, SOLUTION SUBCUTANEOUS DAILY
Qty: 15 ML | Refills: 0 | Status: SHIPPED | OUTPATIENT
Start: 2021-11-17 | End: 2022-04-08

## 2021-11-29 DIAGNOSIS — J44.1 CHRONIC OBSTRUCTIVE PULMONARY DISEASE WITH ACUTE EXACERBATION (HCC): Primary | ICD-10-CM

## 2021-11-30 RX ORDER — FLUTICASONE PROPIONATE AND SALMETEROL XINAFOATE 115; 21 UG/1; UG/1
AEROSOL, METERED RESPIRATORY (INHALATION)
Qty: 12 G | Refills: 0 | Status: SHIPPED | OUTPATIENT
Start: 2021-11-30 | End: 2022-03-02

## 2021-12-03 DIAGNOSIS — E11.65 UNCONTROLLED TYPE 2 DIABETES MELLITUS WITH HYPERGLYCEMIA (HCC): ICD-10-CM

## 2021-12-03 RX ORDER — CALCIUM CITRATE/VITAMIN D3 200MG-6.25
TABLET ORAL
Qty: 100 EACH | Refills: 0 | Status: SHIPPED | OUTPATIENT
Start: 2021-12-03 | End: 2022-01-05

## 2021-12-13 ENCOUNTER — TELEPHONE (OUTPATIENT)
Dept: FAMILY MEDICINE CLINIC | Facility: CLINIC | Age: 36
End: 2021-12-13

## 2021-12-13 NOTE — TELEPHONE ENCOUNTER
Patient called back to discuss if the cancelled visit for today  I offered her on Thursday and she was unable to do Thursday due to a sleep study she is going to  She said she will call back to reschedule

## 2021-12-14 ENCOUNTER — OFFICE VISIT (OUTPATIENT)
Dept: VASCULAR SURGERY | Facility: CLINIC | Age: 36
End: 2021-12-14
Payer: MEDICARE

## 2021-12-14 VITALS
DIASTOLIC BLOOD PRESSURE: 80 MMHG | HEART RATE: 86 BPM | SYSTOLIC BLOOD PRESSURE: 140 MMHG | HEIGHT: 61 IN | BODY MASS INDEX: 41.16 KG/M2 | TEMPERATURE: 98.8 F | WEIGHT: 218 LBS

## 2021-12-14 DIAGNOSIS — L85.3 DRY SKIN: ICD-10-CM

## 2021-12-14 DIAGNOSIS — I73.9 PVD (PERIPHERAL VASCULAR DISEASE) (HCC): ICD-10-CM

## 2021-12-14 DIAGNOSIS — I73.9 PERIPHERAL ARTERY DISEASE (HCC): Primary | ICD-10-CM

## 2021-12-14 DIAGNOSIS — I73.9 PAD (PERIPHERAL ARTERY DISEASE) (HCC): ICD-10-CM

## 2021-12-14 PROBLEM — I70.209 SUPERFICIAL FEMORAL ARTERY OCCLUSION (HCC): Status: RESOLVED | Noted: 2021-08-22 | Resolved: 2021-12-14

## 2021-12-14 PROCEDURE — 99213 OFFICE O/P EST LOW 20 MIN: CPT | Performed by: SURGERY

## 2021-12-14 RX ORDER — VIT E ACET/GLY/DIMETH/WATER
LOTION (ML) TOPICAL AS NEEDED
Qty: 236 ML | Refills: 3 | Status: SHIPPED | OUTPATIENT
Start: 2021-12-14

## 2021-12-30 ENCOUNTER — TELEPHONE (OUTPATIENT)
Dept: CARDIOLOGY CLINIC | Facility: CLINIC | Age: 36
End: 2021-12-30

## 2021-12-31 DIAGNOSIS — M79.672 LEFT FOOT PAIN: ICD-10-CM

## 2022-01-05 DIAGNOSIS — E11.42 TYPE 2 DIABETES MELLITUS WITH DIABETIC POLYNEUROPATHY, WITH LONG-TERM CURRENT USE OF INSULIN (HCC): ICD-10-CM

## 2022-01-05 DIAGNOSIS — E11.65 UNCONTROLLED TYPE 2 DIABETES MELLITUS WITH HYPERGLYCEMIA (HCC): ICD-10-CM

## 2022-01-05 DIAGNOSIS — Z79.4 TYPE 2 DIABETES MELLITUS WITH DIABETIC POLYNEUROPATHY, WITH LONG-TERM CURRENT USE OF INSULIN (HCC): ICD-10-CM

## 2022-01-05 RX ORDER — LANCETS 30 GAUGE
EACH MISCELLANEOUS 3 TIMES DAILY
Qty: 100 EACH | Refills: 0 | Status: SHIPPED | OUTPATIENT
Start: 2022-01-05 | End: 2022-02-22

## 2022-01-05 RX ORDER — CALCIUM CITRATE/VITAMIN D3 200MG-6.25
TABLET ORAL
Qty: 100 EACH | Refills: 0 | Status: SHIPPED | OUTPATIENT
Start: 2022-01-05

## 2022-01-10 DIAGNOSIS — E11.9 DIABETES (HCC): ICD-10-CM

## 2022-01-26 DIAGNOSIS — G60.9 IDIOPATHIC PERIPHERAL NEUROPATHY: ICD-10-CM

## 2022-01-28 RX ORDER — PREGABALIN 50 MG/1
50 CAPSULE ORAL 3 TIMES DAILY
Qty: 270 CAPSULE | Refills: 0 | Status: SHIPPED | OUTPATIENT
Start: 2022-01-28 | End: 2022-03-23 | Stop reason: SDUPTHER

## 2022-02-04 ENCOUNTER — TELEPHONE (OUTPATIENT)
Dept: FAMILY MEDICINE CLINIC | Facility: CLINIC | Age: 37
End: 2022-02-04

## 2022-02-04 NOTE — TELEPHONE ENCOUNTER
Called Tony on home and mobile, but sent to voicemail and [facility]  [facility]ed patient's alternate , Henry Anaya[facility]  Attempting to call to follow up with patient and review medications and try to schedule appointment

## 2022-02-21 ENCOUNTER — TELEPHONE (OUTPATIENT)
Dept: SURGERY | Facility: CLINIC | Age: 37
End: 2022-02-21

## 2022-02-21 DIAGNOSIS — J44.9 CHRONIC OBSTRUCTIVE PULMONARY DISEASE, UNSPECIFIED COPD TYPE (HCC): ICD-10-CM

## 2022-02-21 RX ORDER — ALBUTEROL SULFATE 90 UG/1
AEROSOL, METERED RESPIRATORY (INHALATION)
Qty: 8.5 G | Refills: 5 | Status: SHIPPED | OUTPATIENT
Start: 2022-02-21 | End: 2022-06-26

## 2022-02-22 DIAGNOSIS — Z79.4 TYPE 2 DIABETES MELLITUS WITH DIABETIC POLYNEUROPATHY, WITH LONG-TERM CURRENT USE OF INSULIN (HCC): ICD-10-CM

## 2022-02-22 DIAGNOSIS — E11.42 TYPE 2 DIABETES MELLITUS WITH DIABETIC POLYNEUROPATHY, WITH LONG-TERM CURRENT USE OF INSULIN (HCC): ICD-10-CM

## 2022-02-22 RX ORDER — LANCETS 30 GAUGE
EACH MISCELLANEOUS 3 TIMES DAILY
Qty: 100 EACH | Refills: 0 | Status: SHIPPED | OUTPATIENT
Start: 2022-02-22

## 2022-03-02 DIAGNOSIS — J44.1 CHRONIC OBSTRUCTIVE PULMONARY DISEASE WITH ACUTE EXACERBATION (HCC): ICD-10-CM

## 2022-03-02 RX ORDER — FLUTICASONE PROPIONATE AND SALMETEROL XINAFOATE 115; 21 UG/1; UG/1
AEROSOL, METERED RESPIRATORY (INHALATION)
Qty: 12 G | Refills: 0 | Status: SHIPPED | OUTPATIENT
Start: 2022-03-02 | End: 2022-04-28

## 2022-03-03 DIAGNOSIS — M79.672 LEFT FOOT PAIN: ICD-10-CM

## 2022-03-11 ENCOUNTER — TELEPHONE (OUTPATIENT)
Dept: VASCULAR SURGERY | Facility: CLINIC | Age: 37
End: 2022-03-11

## 2022-03-11 NOTE — TELEPHONE ENCOUNTER
----- Message from Lisha Levi sent at 3/11/2022  2:50 PM EST -----  Regarding: RE: [address] (3/14/2022)  They all set up for that day  The day of they will receive a call/text from the  with the 's name, make/model of their vehicle, and the time until they get there  We have him/her set up for a 9:30 AM   When they are done with the appointment give us a call at [phone] and we can set up their return ride  Please note that [facility] is not a guaranteed service and is based on  availability for that area  Please have an alternate option if [facility] is not available to  or take the patient home   Thank you!  ----- Message -----  From: Katie Sherman  Sent: 3/11/2022   2:37 PM EST  To: [facility] [address] [place] [address], #  Subject: [employer] (3/14/2022)                       Patients Name: dEuardo Emmanuel  : 1985  Phone Number: [phone]  Appointment Date: 3/14/2022  Appointment time: 10:30am   Address: [address]  [place] [id number]  Drop off Facility/Office Name: [facility]  Address: [address] [place] [address] [facility]: [phone]  Special notes/directions for   Note for scheduling team

## 2022-03-17 DIAGNOSIS — I10 HYPERTENSION: ICD-10-CM

## 2022-03-23 DIAGNOSIS — G60.9 IDIOPATHIC PERIPHERAL NEUROPATHY: Primary | ICD-10-CM

## 2022-03-23 DIAGNOSIS — I73.9 PERIPHERAL ARTERY DISEASE (HCC): ICD-10-CM

## 2022-03-23 NOTE — TELEPHONE ENCOUNTER
Please check to see if she is still taking Lyrica  I do not prescribe it and I do not see it on [facility] note

## 2022-03-23 NOTE — TELEPHONE ENCOUNTER
Received a fax from [facility] requesting refills of xarelto and pregabalin  Called pt to confirm  She confirmed that she would like 90 day scripts for these two medications sent to [facility] pharmacy

## 2022-03-24 RX ORDER — PREGABALIN 50 MG/1
50 CAPSULE ORAL 3 TIMES DAILY
Qty: 270 CAPSULE | Refills: 0 | Status: SHIPPED | OUTPATIENT
Start: 2022-03-24 | End: 2022-06-22

## 2022-03-24 NOTE — TELEPHONE ENCOUNTER
Pt confirmed on the phone yesterday that she was requesting a refill of the Lyrica  This medication was refilled by Dr Elizabeth Yarbrough on 1/28/22

## 2022-04-04 RX ORDER — LISINOPRIL 10 MG/1
TABLET ORAL
Qty: 30 TABLET | Refills: 0 | Status: SHIPPED | OUTPATIENT
Start: 2022-04-04 | End: 2022-04-18

## 2022-04-06 DIAGNOSIS — E11.65 UNCONTROLLED TYPE 2 DIABETES MELLITUS WITH HYPERGLYCEMIA (HCC): ICD-10-CM

## 2022-04-08 RX ORDER — INSULIN GLARGINE 100 [IU]/ML
40 INJECTION, SOLUTION SUBCUTANEOUS DAILY
Qty: 15 ML | Refills: 0 | Status: SHIPPED | OUTPATIENT
Start: 2022-04-08 | End: 2022-04-28

## 2022-04-11 ENCOUNTER — RA CDI HCC (OUTPATIENT)
Dept: OTHER | Facility: HOSPITAL | Age: 37
End: 2022-04-11

## 2022-04-11 NOTE — PROGRESS NOTES
[facility]  coding opportunities     E11 42, Z79 4, E11 65, E66 01     Chart Reviewed number of suggestions sent to Provider: 4     Patients Insurance     Medicare Insurance: [employer]

## 2022-04-18 DIAGNOSIS — E11.42 TYPE 2 DIABETES MELLITUS WITH DIABETIC POLYNEUROPATHY, WITH LONG-TERM CURRENT USE OF INSULIN (HCC): Primary | ICD-10-CM

## 2022-04-18 DIAGNOSIS — Z79.4 TYPE 2 DIABETES MELLITUS WITH DIABETIC POLYNEUROPATHY, WITH LONG-TERM CURRENT USE OF INSULIN (HCC): Primary | ICD-10-CM

## 2022-04-18 RX ORDER — LANCING DEVICE
EACH MISCELLANEOUS
Qty: 1 EACH | Refills: 0 | Status: SHIPPED | OUTPATIENT
Start: 2022-04-18 | End: 2022-05-10

## 2022-04-26 DIAGNOSIS — M79.672 LEFT FOOT PAIN: ICD-10-CM

## 2022-04-26 DIAGNOSIS — E11.65 UNCONTROLLED TYPE 2 DIABETES MELLITUS WITH HYPERGLYCEMIA (HCC): ICD-10-CM

## 2022-04-26 DIAGNOSIS — I10 HYPERTENSION: ICD-10-CM

## 2022-04-26 DIAGNOSIS — J44.1 CHRONIC OBSTRUCTIVE PULMONARY DISEASE WITH ACUTE EXACERBATION (HCC): ICD-10-CM

## 2022-04-26 DIAGNOSIS — E11.9 DIABETES (HCC): ICD-10-CM

## 2022-04-28 RX ORDER — LISINOPRIL 10 MG/1
TABLET ORAL
Qty: 30 TABLET | Refills: 0 | Status: SHIPPED | OUTPATIENT
Start: 2022-04-28 | End: 2022-05-12

## 2022-04-28 RX ORDER — INSULIN GLARGINE 100 [IU]/ML
40 INJECTION, SOLUTION SUBCUTANEOUS DAILY
Qty: 15 ML | Refills: 0 | Status: SHIPPED | OUTPATIENT
Start: 2022-04-28

## 2022-04-28 RX ORDER — FLUTICASONE PROPIONATE AND SALMETEROL XINAFOATE 115; 21 UG/1; UG/1
AEROSOL, METERED RESPIRATORY (INHALATION)
Qty: 12 G | Refills: 0 | Status: SHIPPED | OUTPATIENT
Start: 2022-04-28

## 2022-05-23 DIAGNOSIS — I10 HYPERTENSION: ICD-10-CM

## 2022-05-23 DIAGNOSIS — E11.9 DIABETES (HCC): ICD-10-CM

## 2022-05-23 RX ORDER — LISINOPRIL 10 MG/1
TABLET ORAL
Qty: 30 TABLET | Refills: 0 | Status: SHIPPED | OUTPATIENT
Start: 2022-05-23

## 2022-05-28 DIAGNOSIS — E11.65 UNCONTROLLED TYPE 2 DIABETES MELLITUS WITH HYPERGLYCEMIA (HCC): Primary | ICD-10-CM

## 2022-05-31 ENCOUNTER — OFFICE VISIT (OUTPATIENT)
Dept: VASCULAR SURGERY | Facility: CLINIC | Age: 37
End: 2022-05-31
Payer: COMMERCIAL

## 2022-05-31 ENCOUNTER — HOSPITAL ENCOUNTER (OUTPATIENT)
Dept: NON INVASIVE DIAGNOSTICS | Facility: CLINIC | Age: 37
Discharge: HOME | End: 2022-05-31
Payer: COMMERCIAL

## 2022-05-31 VITALS
DIASTOLIC BLOOD PRESSURE: 90 MMHG | BODY MASS INDEX: 43.8 KG/M2 | WEIGHT: 232 LBS | HEART RATE: 56 BPM | HEIGHT: 61 IN | SYSTOLIC BLOOD PRESSURE: 130 MMHG

## 2022-05-31 DIAGNOSIS — I73.9 PERIPHERAL ARTERY DISEASE (HCC): ICD-10-CM

## 2022-05-31 DIAGNOSIS — I73.9 PAD (PERIPHERAL ARTERY DISEASE) (HCC): Primary | ICD-10-CM

## 2022-05-31 PROCEDURE — 93922 UPR/L XTREMITY ART 2 LEVELS: CPT | Performed by: SURGERY

## 2022-05-31 PROCEDURE — 93923 UPR/LXTR ART STDY 3+ LVLS: CPT

## 2022-05-31 PROCEDURE — 93925 LOWER EXTREMITY STUDY: CPT | Performed by: SURGERY

## 2022-05-31 PROCEDURE — 99211 OFF/OP EST MAY X REQ PHY/QHP: CPT

## 2022-05-31 PROCEDURE — 93925 LOWER EXTREMITY STUDY: CPT

## 2022-05-31 RX ORDER — ISOPROPYL ALCOHOL 0.7 ML/ML
SWAB TOPICAL
Qty: 100 EACH | Refills: 3 | Status: SHIPPED | OUTPATIENT
Start: 2022-05-31

## 2022-05-31 NOTE — PROGRESS NOTES
Assessment/Plan:  Pt refused to be seen today stating she "does not want to wait and is too tired"  She stated she would like to reschedule her appointment once she is out of the correctional facility  No problem-specific Assessment & Plan notes found for this encounter  There are no diagnoses linked to this encounter  Subjective:      Patient ID: iMshel Whittaker is a 29 y o  female  Patient present to follow up on wound located on Left foot by the 5th toe  Patient reports numbness and tingling on her left leg  Patient reports the wound happened after her surgery done on 9/2021 and has not fully healed since  Patient states she does not get wound care from the [facility]  Patient is currently taking ASA, Atorvastatin and Xarelto  HPI    The following portions of the patient's history were reviewed and updated as appropriate: allergies, current medications, past family history, past medical history, past social history, past surgical history and problem list     Review of Systems   Constitutional: Negative  HENT: Negative  Eyes: Negative  Respiratory: Negative  Cardiovascular: Negative  Gastrointestinal: Negative  Endocrine: Negative  Genitourinary: Negative  Musculoskeletal: Positive for gait problem (restains)  Skin:        Dry skin     Allergic/Immunologic: Negative  Hematological: Negative  Psychiatric/Behavioral: Negative            Objective:      Vitals:    05/31/22 1423   BP: 130/90   BP Location: Left arm   Patient Position: Sitting   Cuff Size: Adult   Pulse: 56   Weight: 105 kg (232 lb)   Height: 5' 1" (1 549 m)       Patient Active Problem List   Diagnosis    Headache    Essential hypertension    Type 2 diabetes mellitus with diabetic polyneuropathy, with long-term current use of insulin (HCC)    Paresthesia    COPD (chronic obstructive pulmonary disease) (HCC)    Nicotine dependence    Bipolar disorder (HCC)    Leg pain    Bilateral leg pain  PAD (peripheral artery disease) (HCC)    Body mass index (BMI)40 0-44 9, adult    Bursitis of left knee    Left leg pain    Atherosclerosis of left leg (HCC)    Positive D dimer    Surgical wound breakdown, sequela       Past Surgical History:   Procedure Laterality Date    BYPASS FEMORAL-POPLITEAL Left 2021    Procedure: Left Common Femoral Below Knee to Popliteal Bypass with Insitu GSV graft    Left Lower Extremity Angiogram;  Surgeon: Bia Jack MD;  [facility]: [facility];  Service: Vascular     SECTION      EAR SURGERY      IR LOWER EXTREMITY ANGIOGRAM  2021    IR LOWER EXTREMITY ANGIOGRAM  2021       Family History   Problem Relation Age of Onset    Hypertension Mother     Diabetes Mother     HIV Mother     Heart disease Mother     No Known Problems Father        Social History     Socioeconomic History    Marital status: /Civil Union     Spouse name: Not on file    Number of children: Not on file    Years of education: Not on file    Highest education level: Not on file   Occupational History    Not on file   Tobacco Use    Smoking status: Current Every Day Smoker     Packs/day: 1 50     Years: 17 00     Pack years: 25 50     Types: Cigarettes    Smokeless tobacco: Never Used    Tobacco comment: pt is down to 4 cigarettes a day    Vaping Use    Vaping Use: Never used   Substance and Sexual Activity    Alcohol use: Not Currently     Alcohol/week: 1 0 standard drink     Types: 1 Shots of liquor per week     Comment: 'I would drink whatever "    Drug use: Yes     Types: Cocaine, Marijuana, "Crack" cocaine     Comment: 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    Sexual activity: Yes     Partners: Female, Male     Birth control/protection: None, Condom   Other Topics Concern    Not on file   Social History Narrative    Not on file     Social Determinants of Health     Financial Resource Strain: Not on file   Food Insecurity: Not on file   Transportation Needs: Not on file   Physical Activity: Not on file   Stress: Not on file   Social Connections: Not on file   Intimate Partner Violence: Not on file   Housing Stability: Not on file       No Known Allergies      Current Outpatient Medications:     acetaminophen (TYLENOL) 325 mg tablet, Take 2 tablets (650 mg total) by mouth every 6 (six) hours as needed for mild pain, Disp: , Rfl: 0    Advair -21 MCG/ACT inhaler, INHALE 2 PUFFS BY MOUTH TWICE DAILY    RINSE MOUTH AFTER USE, Disp: 12 g, Rfl: 0    albuterol (2 5 mg/3 mL) 0 083 % nebulizer solution, TAKE 3 ML VIA NEBULIZER EVERY 6 (SIX) HOURS AS NEEDED FOR WHEEZING OR SHORTNESS OF BREATH, Disp: 150 mL, Rfl: 5    albuterol (PROVENTIL HFA,VENTOLIN HFA) 90 mcg/act inhaler, INHALE 2 PUFFS 4 (FOUR) TIMES A DAY, Disp: 8 5 g, Rfl: 5    Alcohol Swabs (Pharmacist Choice Alcohol) PADS, USE 3-4 TIMES AS DIRECTED , Disp: 100 each, Rfl: 3    ARIPiprazole (ABILIFY) 10 mg tablet, HALF A TAB FOR ONE WEEK THEN ONE TAB DAILY AT BEDTIME, Disp: , Rfl:     aspirin 81 mg chewable tablet, Chew 1 tablet (81 mg total) daily, Disp: 30 tablet, Rfl: 0    atorvastatin (LIPITOR) 40 mg tablet, , Disp: , Rfl:     BD Pen Needle Micro U/F 32G X 6 MM MISC, use 1 NEEDLE to inject MEDICATION subcutaneously twice a day, Disp: 100 each, Rfl: 0    Blood Glucose Monitoring Suppl (True Metrix Air Glucose Meter) w/Device KIT, use as directed, Disp: 1 kit, Rfl: 0    Blood Pressure Monitoring (Blood Pressure Monitor Automat) KATLYN, Use Daily as Directed, Disp: 1 Device, Rfl: 0    cetaphil (CETAPHIL) lotion, Apply topically as needed for dry skin, Disp: 236 mL, Rfl: 3    chlorproMAZINE (THORAZINE) 100 mg tablet, Take 100 mg by mouth daily at bedtime, Disp: , Rfl:     chlorproMAZINE (THORAZINE) 50 mg tablet, Take 50 mg by mouth daily at bedtime, Disp: , Rfl:     Comfort EZ Pen Needles 32G X 4 MM MISC, USE 3-4 TIMES A DAY, Disp: , Rfl:     cyclobenzaprine (FLEXERIL) 5 mg tablet, Take 1 tablet (5 mg total) by mouth 3 (three) times a day, Disp: 45 tablet, Rfl: 0    Diclofenac Sodium (VOLTAREN) 1 %, APPLY 2 G TOPICALLY 4 (FOUR) TIMES A DAY, Disp: 100 g, Rfl: 1    Diclofenac Sodium (VOLTAREN) 1 %, APPLY 2 G TOPICALLY 4 (FOUR) TIMES A DAY, Disp: 100 g, Rfl: 1    Dulaglutide (Trulicity) 1 5 BK/3 8MG DANIELLE, Tony Roberto, Disp: 2 mL, Rfl: 0    Global Inject Ease Lancets 30G MISC, USE 3 (THREE) TIMES A DAY, Disp: 100 each, Rfl: 0    hydrOXYzine HCL (ATARAX) 50 mg tablet, Take 50 mg by mouth 3 (three) times a day, Disp: , Rfl:     hydrOXYzine pamoate (VISTARIL) 50 mg capsule, TAKE ONE (1) CAPSULE BY MOUTH 3 TIMES A DAY, Disp: , Rfl:     Lancet Devices (Lancing Device) MISC, USE AS DIRECTED, Disp: 1 each, Rfl: 0    Lantus SoloStar 100 units/mL injection pen, INJECT 40 UNITS UNDER THE SKIN DAILY, Disp: 15 mL, Rfl: 0    Latuda 120 MG tablet, TAKE 1 TABLET (120 MG) BY ORAL ROUTE ONCE DAILY WITH FOOD (AT LEAST 350 CALORIES), Disp: , Rfl:     lidocaine (XYLOCAINE) 5 % ointment, Apply 1 application topically 3 (three) times a day, Disp: , Rfl:     lisinopril (ZESTRIL) 10 mg tablet, TAKE TWO (2) TABLETS BY MOUTH DAILY, Disp: 30 tablet, Rfl: 0    lurasidone (LATUDA) 80 mg tablet, Take 1 tablet (80 mg total) by mouth daily with breakfast, Disp: 30 tablet, Rfl: 0    metFORMIN (GLUCOPHAGE) 1000 MG tablet, TAKE 1 TABLET (1,000 MG TOTAL) BY MOUTH 2 (TWO) TIMES A DAY WITH MEALS, Disp: 60 tablet, Rfl: 0    nicotine (NICODERM CQ) 21 mg/24 hr TD 24 hr patch, Place 1 patch on the skin daily, Disp: 7 patch, Rfl: 0    prazosin (MINIPRESS) 1 mg capsule, Take 1 mg by mouth daily at bedtime, Disp: , Rfl:     pregabalin (LYRICA) 50 mg capsule, Take 1 capsule (50 mg total) by mouth 3 (three) times a day, Disp: 270 capsule, Rfl: 0    rivaroxaban (Xarelto) 2 5 mg tablet, Take 1 tablet (2 5 mg total) by mouth 2 (two) times a day, Disp: 180 tablet, Rfl: 3    rivaroxaban (XARELTO) 2 5 mg tablet, Take 1 tablet (2 5 mg total) by mouth 2 (two) times a day with meals, Disp: 180 tablet, Rfl: 0    senna-docusate sodium (SENOKOT S) 8 6-50 mg per tablet, Take 2 tablets by mouth daily at bedtime, Disp: 30 tablet, Rfl: 0    True Metrix Blood Glucose Test test strip, USE ONE STRIP 3 TIMES DAILY FOR BLOOD GLUCOSE TESTING  PATIENT HAS TRUE METRIX GLUCOMETER, Disp: 100 each, Rfl: 0    Current Facility-Administered Medications:     lidocaine (LMX) 4 % cream, , Topical, Daily PRN, Reza Brannon MD      There were no vitals taken for this visit           Physical Exam    No physical exam was performed due to patient compliance

## 2022-06-01 NOTE — PROGRESS NOTES
Vascular Surgery     Patient refused to be seen and examined today 5/31/22 stating she "does not want to wait and is too tired"  She stated she would like to reschedule her appointment once she is out of the correctional facility, in possibly 2 months  I discussed with her the risks of not being seen/examined and the importance of surveillance imaging to assess bypass patency  I discussed the results of her most recent arterial study, which showed 50-75% stenosis at the distal anastomosis of the bypass graft with GTP of 0  Pt stated that she continues to have a non-healing wound on her lateral left foot that has been ongoing for several months and that the left foot feels numb and cold  I discussed with her, based on her recent study, that her ability to heal this wound is low and could potentially progress into a more several infection  I discussed the need for possible LLE angiogram to ensure bypass patency and improve blood flow to the foot  I discussed that if left untreated, she is at risk for major limb loss  Patient verbalized understanding and wishes to wait to be re-evaluated until she is out of the correctional facility  I attempted to call and inform [phone] at [facility] facility, however was unable to get a hold of anyone  Will attempt to call again to give an update on recent encounter         Osvaldo Taylor  [facility]  [phone]

## 2022-06-01 NOTE — PROGRESS NOTES
Vascular Surgery     Patient refused to be seen and examined 5/31/22 stating she "does not want to wait and is too tired"  She stated she would like to reschedule her appointment once she is out of the correctional facility, in possibly 2 months  I advised against rescheduling to be seen at a later date given the severity of her current symptoms  I had a discussion on the risks of not being seen/examined and the importance of surveillance imaging to assess bypass patency  I discussed the results of her most recent arterial study, which showed 50-75% stenosis at the distal anastomosis of the bypass graft with GTP of 0  Pt stated that she continues to have a non-healing wound on her lateral left foot that has been ongoing for several months and that the left foot feels numb and cold  I discussed with her, based on her recent study, that her ability to heal this wound is low and could potentially progress into a more several infection  I discussed the need for possible LLE angiogram to ensure bypass patency and improve blood flow to the foot  I discussed that if left untreated, she is at risk for major limb loss  Patient verbalized understanding and wishes to wait to be re-evaluated or have any interventions performed until she is out of the correctional facility  I attempted to call and inform [phone] at [facility] facility, however was unable to get a hold of anyone  Will attempt to call again to give an update on recent encounter         Bonnie Mayen  [facility]  [phone]

## 2022-06-02 ENCOUNTER — TELEPHONE (OUTPATIENT)
Dept: VASCULAR SURGERY | Facility: CLINIC | Age: 37
End: 2022-06-02

## 2022-06-02 NOTE — TELEPHONE ENCOUNTER
----- Message from Yolis Evans sent at 6/1/2022  9:21 AM EDT -----  Call patient for OV with James Contreras

## 2022-06-02 NOTE — TELEPHONE ENCOUNTER
Patient was to be seen on 5/31/22 but left without being seen  She requests an appt in 2 months once she is released from the facility    We can try and call in a couple of months to schedule her folow up appt with Dr Sharon Puckett to review the EZEKIEL done 5/31/22

## 2022-06-25 DIAGNOSIS — J44.9 CHRONIC OBSTRUCTIVE PULMONARY DISEASE, UNSPECIFIED COPD TYPE (HCC): ICD-10-CM

## 2022-06-26 RX ORDER — ALBUTEROL SULFATE 90 UG/1
AEROSOL, METERED RESPIRATORY (INHALATION)
Qty: 8.5 G | Refills: 5 | Status: SHIPPED | OUTPATIENT
Start: 2022-06-26 | End: 2022-07-26

## 2022-06-27 ENCOUNTER — OFFICE VISIT (OUTPATIENT)
Dept: VASCULAR SURGERY | Facility: CLINIC | Age: 37
End: 2022-06-27
Payer: COMMERCIAL

## 2022-06-27 ENCOUNTER — HOSPITAL ENCOUNTER (OUTPATIENT)
Dept: NON INVASIVE DIAGNOSTICS | Facility: CLINIC | Age: 37
Discharge: HOME | End: 2022-06-27
Payer: COMMERCIAL

## 2022-06-27 VITALS — BODY MASS INDEX: 45.12 KG/M2 | HEIGHT: 61 IN | WEIGHT: 239 LBS | HEART RATE: 93 BPM

## 2022-06-27 DIAGNOSIS — F17.200 NICOTINE DEPENDENCE, UNCOMPLICATED, UNSPECIFIED NICOTINE PRODUCT TYPE: ICD-10-CM

## 2022-06-27 DIAGNOSIS — E66.01 CLASS 3 SEVERE OBESITY DUE TO EXCESS CALORIES WITHOUT SERIOUS COMORBIDITY WITH BODY MASS INDEX (BMI) OF 40.0 TO 44.9 IN ADULT (HCC): ICD-10-CM

## 2022-06-27 DIAGNOSIS — E11.42 TYPE 2 DIABETES MELLITUS WITH DIABETIC POLYNEUROPATHY, WITH LONG-TERM CURRENT USE OF INSULIN (HCC): ICD-10-CM

## 2022-06-27 DIAGNOSIS — I73.9 PAD (PERIPHERAL ARTERY DISEASE) (HCC): Primary | ICD-10-CM

## 2022-06-27 DIAGNOSIS — Z79.4 TYPE 2 DIABETES MELLITUS WITH DIABETIC POLYNEUROPATHY, WITH LONG-TERM CURRENT USE OF INSULIN (HCC): ICD-10-CM

## 2022-06-27 DIAGNOSIS — I73.9 PAD (PERIPHERAL ARTERY DISEASE) (HCC): ICD-10-CM

## 2022-06-27 DIAGNOSIS — I10 ESSENTIAL HYPERTENSION: ICD-10-CM

## 2022-06-27 PROBLEM — E66.813 CLASS 3 SEVERE OBESITY DUE TO EXCESS CALORIES WITHOUT SERIOUS COMORBIDITY WITH BODY MASS INDEX (BMI) OF 40.0 TO 44.9 IN ADULT (HCC): Status: ACTIVE | Noted: 2022-06-27

## 2022-06-27 PROCEDURE — 93923 UPR/LXTR ART STDY 3+ LVLS: CPT

## 2022-06-27 PROCEDURE — 1036F TOBACCO NON-USER: CPT | Performed by: PHYSICIAN ASSISTANT

## 2022-06-27 PROCEDURE — 99214 OFFICE O/P EST MOD 30 MIN: CPT | Performed by: PHYSICIAN ASSISTANT

## 2022-06-27 PROCEDURE — 3008F BODY MASS INDEX DOCD: CPT | Performed by: PHYSICIAN ASSISTANT

## 2022-06-27 PROCEDURE — 93922 UPR/L XTREMITY ART 2 LEVELS: CPT | Performed by: SURGERY

## 2022-06-27 NOTE — PATIENT INSTRUCTIONS
S/P L SFA /popliteal interventions (8/2021) followed by L CFA to BK popliteal bypass with insitu GSV (9/14/2)    -still has L lower leg numbness since surgery  -L 5th toe callous    -EZEKIEL 5/31/22    R no significant LE occlusive disease, 1 1/138/111    L patent CFA to BK popliteal bypass with 50-75% stenosis at prox anastomosis, patent branch off bypass graft in prox / mid thigh, triphasic waveforms,1 01/41/0 (prior 1 07/118/116)    -EZEKIEL 10/27/21: R 1 13/158/119; L patent CFA to BK pop bypass with 50-75% stenosis of prox anastomosis, 1 07/118/116    Plan:    Recommend repeating NIKKI study to evaluate metatarsal and toe pressures  Her examination suggests that the pressures may be better than stated on the test   Clinically the bypass is patent with bypass signal present as well as a good posterior tibial signal   There is a good distal DP signal to the toe  Foot is warm and well perfused  Capillary refill is less than 2 seconds  She does have a callus at the lateral aspect of the 5th toe which should be treated locally by Podiatry     -We discussed the pathophysiology treatment of arterial occlusive disease   -Continue with Xarelto (PAD dosing) and statin therapy  -Thinks that she has not been getting aspirin but this should be restarted  -Recommend regular walking for at least 30 minutes for 5 days weekly  -Continue smoking cessation  -Recommend close observation of both feet for any new wounds or skin changes for which she should be seen sooner  -Follow up EZEKIEL in 3 months with office visit      Addendum: We were able to repeat ABIs in the office today  The metatarsal pressure is 92 great toe pressure is 18  She should have podiatric care as needed for L lateral foot wound and if there is any concerns for healing then she should be seen sooner

## 2022-06-27 NOTE — PROGRESS NOTES
Assessment/Plan:    PAD (peripheral artery disease) ([facility])  -     VAS NIKKI & waveform analysis, multiple levels; Future  -     VAS lower limb arterial duplex, complete bilateral; Future    -S/P L SFA /popliteal interventions (8/2021) followed by L CFA to BK popliteal bypass with insitu GSV (9/14/2)    -L 5th lateral metatarsal callous which is now tender and painful (residual from prior wound)   -still has L lower leg numbness since surgery      -EZEKIEL 5/31/22    R no significant LE occlusive disease, 1 1/138/111    L patent CFA to BK popliteal bypass with 50-75% stenosis at prox anastomosis, patent branch off bypass graft in prox / mid thigh, triphasic waveforms,1 01/41/0 (prior 1 07/118/116)    -EZEKIEL 10/27/21: R 1 13/158/119; L patent CFA to BK pop bypass with 50-75% stenosis of prox anastomosis, 1 07/118/116    Plan:  Recommend repeating NIKKI study to evaluate metatarsal and toe pressures  Her examination suggests that the pressures may be better than stated on the test   Clinically the bypass is patent with bypass signal present as well as a good posterior tibial signal   There is a good distal DP signal to the toe  Foot is warm and well perfused  Capillary refill is less than 2 seconds  She does have a callus at the lateral aspect of the 5th toe which should be treated locally by Podiatry     -We discussed the pathophysiology treatment of arterial occlusive disease   -Continue with Xarelto (PAD dosing) and statin therapy  -Thinks that she has not been getting aspirin but this should be restarted  -Recommend regular walking for at least 30 minutes for 5 days weekly  -Continue smoking cessation (recent quit)  -Recommend close observation of both feet for any new wounds or skin changes for which she should be seen sooner  -Follow up EZEKIEL in 3 months with office visit      Addendum: We were able to repeat ABIs in the office today  The metatarsal pressure is 92 great toe pressure is 18    The toe pressure is still below healing  However, her 5th metatarsal pain does not seen ischemic  Recommend work up and podiatric care as needed for L lateral foot wound and if there is any concerns for healing then she should be seen sooner  Diagnoses and all orders for this visit:    Nicotine dependence, uncomplicated, unspecified nicotine product type    Type 2 diabetes mellitus with diabetic polyneuropathy, with long-term current use of insulin (HCC)    Essential hypertension    Class 3 severe obesity due to excess calories without serious comorbidity with body mass index (BMI) of 40 0 to 44 9 in adult [facility])          Subjective:      Patient ID: Noe Marroquin is a 29 y o  female  Pt is here to rev EZEKIEL 5/31/22  Pt c/o painful wound on L foot w/ dryness    Pt is taking Xarelto  Pt quit smoking 4/29/22  HPI    Noe Marroquin is a 29 y o  female PAD with L SFA occlusion and rest pain/hx L 5th metatarsal tissue loss, s/p L SFA stent 8/11/21 w/recurrent thrombosis, s/p L CFA-BK pop bypass w/in situ GSV by myself 9/14/21  Incisions are healed and left foot wound is healed  She has intermittent shocks through her leg that is significantly improved since her immediate post-operative period  She has numbness along her skin incision in the leg that is also improved  She states that she is compliant with xarelto but is running out        6/27/22: Ms Helga Campos is currently incarcerated and presents to the office accompanied by 2   She presents to the office today for vascular follow up  She had her LLE bypass August 2021  After bypass, she was able to heal 5th metatarsal wound  She states that the wound healed but she was left with a callous over the area  She also had reduction of pain in the 5th met/toe  In the past couple of months she has developed increased pain in the same area with pain and standing and walking  She states that she quit smoking 2 months ago       She underwent Lower extremity arterial duplex study 5/31/22 which shows patent common femoral to below-knee popliteal bypass with 50-75% stenosis of the proximal anastomosis  There was also a branch of the bypass graft the proximal mid thigh  Triphasic waveforms noted at the ankle  NIKKI 1 01 but metatarsal pressure has dropped to 41 and great toe pressures 0 compared with prior EZEKIEL which also showed patent bypass with proximal anastomosis stenosis, metatarsal and great toe pressures were noted 118 and 116 respectively  There may be a drop in the met/gtp  However, her examination does not appear consistent with the current doppler  Her foot is tender only over the 5th met where she has a callous  I explained that I do not think that she needs arteriogram at this time  I recommend work up and local care for the callous by podiatry  If she does not heal, then we could consider additional vascular procedures  EZEKIEL 5/31/22  [facility] REPORT  CLINICAL:  Indications:  PVD, Unspecified [I73 [id number]]  Patient c/o numbness in the left leg  and pain in the front of the foot plus the toes  Operative History:  2021-09-14 Left CFA to BK popliteal bypass graft with insitu GSV  2021-08-25 Left SFA stent angioplasty and popliteal stent placement  2021-08-11 Left SFA stent placement x 2  Risk Factors  The patient has history of HTN, Diabetes (IDDM), PAD and smoking (current) 1 5  ppd       FINDINGS:     Right                  PSV (cm/s)  EDV  Acceleration Time    Common Femoral Artery         130   12                       Prox Profunda                  83    8                       Prox SFA                       75   13                       Mid SFA                        83   10                       Dist SFA                      128   20                       Proximal Pop                   61   12                       Distal Pop                     87   10                       Tibioperoneal                  67    9                       Dist Post Tibial [address] [facility]  Tibial              50    6                       Dist Peroneal                  38    6                       DorsPedis                      55    6              0 090       Left                   Impression  PSV (cm/s)  EDV  Acceleration Time    Inflow Anastomosis     50-75%             283   38                       High Thigh (Graft)                         46    8                       Mid Thigh (Graft)                          80   11                       Low Thigh (Graft)                          85   11                       Near Knee (Graft)                          75   10                       Outflow Anastomosis                        56    5                       Common Femoral Artery                     165   18                       Dist Post Tibial                           68    6                       Dist  Ant  Tibial                          34    5                       Dist Peroneal                              38    7                       DorsPedis                                  53    6              0 080             CONCLUSION:  Impression:  RIGHT LOWER LIMB:  This resting evaluation shows no evidence of significant lower extremity  arterial occlusive disease  Ankle/Brachial index: 1 1 which is in the normal category (Prior: 1 13)  PVR/ PPG tracings are normal   Metatarsal pressure of 138 mm Hg, (Prior: 158 mm Hg)  Great toe pressure of 111 mm Hg, within the healing range, (Prior: 119 mm Hg)  LEFT LOWER LIMB:  Evaluation demonstrates a patent CFA to BK popliteal bypass graft with 50-75%  stenosis of the proximal anastomosis  Prior 50-75%  There is a patent branch off the bypass graft in the proximal/mid thigh  Triphasic waveforms are visualized at the ankle  Ankle/Brachial index: 1 01 which is in the normal category (Prior: 1 07)  PVR tracings are mildly dampened  PPG tracings are flatlined    Metatarsal pressure of 41 mm Hg, (Prior: 118 mm Hg)  Great toe pressure of 0 mm Hg, within the healing range, (Prior: 116 mm Hg)  Compared to previous study on 10/27/2021, there is a decrease in pressures at  the left metatarsal level and toes  EZEKIEL 10/27/21  CONCLUSION:     Impression:     RIGHT LOWER LIMB:  This resting evaluation shows no evidence of significant lower extremity  arterial occlusive disease  Ankle/Brachial index: 1 13 which is in the normal category (Prior: 1 2)  PVR/ PPG tracings are normal   Metatarsal pressure of 158 mm Hg, (Prior: 169 mm Hg)  Great toe pressure of 119 mm Hg, within the healing range, (Prior: 142 mm Hg)  LEFT LOWER LIMB:  Evaluation demonstrates a patent CFA to BK popliteal bypass graft with 50-75%  stenosis of the proximal anastomosis  There is a non-vascularized structure noted superior to the BPG in the proximal  thigh  There is a patent branch off the bypass graft in the proximal/mid thigh  Triphasic waveforms are visualized at the ankle  Ankle/Brachial index: 1 07 which is in the normal category (Prior: 0 41)  PVR/ PPG tracings are mildly dampened  Metatarsal pressure of 118 mm Hg, (Prior: 0 mm Hg)  Great toe pressure of 116 mm Hg, within the healing range, (Prior: 0 mm Hg)  Compared to previous study on 9/24/2021 and ABIs from 9/8/2021, there is  significant improvement of the left NIKKI with a non-vascularized structure noted  at the proximal thigh of the BPG  The following portions of the patient's history were reviewed and updated as appropriate: allergies, current medications, past family history, past medical history, past social history, past surgical history and problem list     Review of Systems   Constitutional: Negative  HENT: Negative  Eyes: Negative  Respiratory: Negative  Cardiovascular: Negative  Gastrointestinal: Negative  Endocrine: Negative  Genitourinary: Negative  Musculoskeletal: Negative           L foot pain   Skin: Positive for wound  Allergic/Immunologic: Negative  Neurological: Negative  Hematological: Negative  Psychiatric/Behavioral: Negative  Objective:      Pulse 93   Ht 5' 1" (1 549 m)   Wt 108 kg (239 lb)   BMI 45 16 kg/m²               L lateral callous, which is firm and tender    LLE bypass signal present as well as a good PT and DP signal out to the toe  Foot is warm and well perfused  Capillary refill is less than 2 seconds  Physical Exam  Vitals and nursing note reviewed  Constitutional:       Appearance: She is well-developed  She is obese  HENT:      Head: Normocephalic and atraumatic  Eyes:      Pupils: Pupils are equal, round, and reactive to light  Neck:      Thyroid: No thyromegaly  Vascular: No JVD  Trachea: Trachea normal    Cardiovascular:      Rate and Rhythm: Normal rate and regular rhythm  Pulses:           Carotid pulses are 2+ on the right side and 2+ on the left side  Radial pulses are 2+ on the right side and 2+ on the left side  Dorsalis pedis pulses are 2+ on the right side  Heart sounds: Normal heart sounds, S1 normal and S2 normal  No murmur heard  No friction rub  No gallop  Comments: LLE bypass  Pulmonary:      Effort: Pulmonary effort is normal  No accessory muscle usage or respiratory distress  Breath sounds: Normal breath sounds  No wheezing or rales  Abdominal:      General: Bowel sounds are normal  There is no distension  Palpations: Abdomen is soft  Tenderness: There is no abdominal tenderness  Musculoskeletal:         General: No deformity  Normal range of motion  Cervical back: Neck supple  Skin:     General: Skin is warm and dry  Findings: No lesion or rash  Nails: There is no clubbing  Neurological:      Mental Status: She is alert and oriented to person, place, and time  Comments: Grossly normal    Psychiatric:         Behavior: Behavior is cooperative  I have reviewed and made appropriate changes to the review of systems input by the medical assistant  Vitals:    22 0909   Pulse: 93   Weight: 108 kg (239 lb)   Height: 5' 1" (1 549 m)       Patient Active Problem List   Diagnosis    Headache    Essential hypertension    Type 2 diabetes mellitus with diabetic polyneuropathy, with long-term current use of insulin (HCC)    Paresthesia    COPD [facility]chronic obstructive pulmonary [facility]    Nicotine dependence    Bipolar disorder ([facility])    Leg pain    Bilateral leg pain    PAD (peripheral artery disease) ([facility]    Body mass index (BMI)40 0-44 9, adult    Bursitis of left knee    Left leg pain    Atherosclerosis of left leg ([facility])    Positive D dimer    Surgical wound breakdown, sequela    Class 3 severe obesity due to excess calories without serious comorbidity with body mass index (BMI) of 40 0 to 44 9 in adult [facility])       Past Surgical History:   Procedure Laterality Date    BYPASS FEMORAL-POPLITEAL Left 2021    Procedure: Left Common Femoral Below Knee to Popliteal Bypass with Insitu GSV graft    Left Lower Extremity Angiogram;  Surgeon: Shania Easton MD;  [facility]: [facility];  Service: Vascular     SECTION      EAR SURGERY      IR LOWER EXTREMITY ANGIOGRAM  2021    IR LOWER EXTREMITY ANGIOGRAM  2021       Family History   Problem Relation Age of Onset    Hypertension Mother     Diabetes Mother     HIV Mother     Heart disease Mother     No Known Problems Father        Social History     Socioeconomic History    Marital status: /Civil Union     Spouse name: Not on file    Number of children: Not on file    Years of education: Not on file    Highest education level: Not on file   Occupational History    Not on file   Tobacco Use    Smoking status: Former Smoker     Packs/day: 1 50     Years: 17 00     Pack years: 25 50     Types: Cigarettes    Smokeless tobacco: Former User     Quit date: 4/29/2022    Tobacco comment: pt is down to 4 cigarettes a day    Vaping Use    Vaping Use: Never used   Substance and Sexual Activity    Alcohol use: Not Currently     Alcohol/week: 1 0 standard drink     Types: 1 Shots of liquor per week     Comment: 'I would drink whatever "    Drug use: Yes     Types: Cocaine, Marijuana, "Crack" cocaine     Comment: 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    Sexual activity: Yes     Partners: Female, Male     Birth control/protection: None, Condom   Other Topics Concern    Not on file   Social History Narrative    Not on file     Social Determinants of Health     Financial Resource Strain: Not on file   Food Insecurity: Not on file   Transportation Needs: Not on file   Physical Activity: Not on file   Stress: Not on file   Social Connections: Not on file   Intimate Partner Violence: Not on file   Housing Stability: Not on file       No Known Allergies      Current Outpatient Medications:     acetaminophen (TYLENOL) 325 mg tablet, Take 2 tablets (650 mg total) by mouth every 6 (six) hours as needed for mild pain, Disp: , Rfl: 0    ARIPiprazole (ABILIFY) 10 mg tablet, HALF A TAB FOR ONE WEEK THEN ONE TAB DAILY AT BEDTIME, Disp: , Rfl:     BD Pen Needle Micro U/F 32G X 6 MM MISC, use 1 NEEDLE to inject MEDICATION subcutaneously twice a day, Disp: 100 each, Rfl: 0    hydrOXYzine pamoate (VISTARIL) 50 mg capsule, TAKE ONE (1) CAPSULE BY MOUTH 3 TIMES A DAY, Disp: , Rfl:     Lancet Devices (Lancing Device) MISC, USE AS DIRECTED, Disp: 1 each, Rfl: 0    Lantus SoloStar 100 units/mL injection pen, INJECT 40 UNITS UNDER THE SKIN DAILY, Disp: 15 mL, Rfl: 0    lisinopril (ZESTRIL) 10 mg tablet, TAKE TWO (2) TABLETS BY MOUTH DAILY, Disp: 30 tablet, Rfl: 0    metFORMIN (GLUCOPHAGE) 1000 MG tablet, TAKE 1 TABLET (1,000 MG TOTAL) BY MOUTH 2 (TWO) TIMES A DAY WITH MEALS, Disp: 60 tablet, Rfl: 0    rivaroxaban (Xarelto) 2 5 mg tablet, Take 1 tablet (2 5 mg total) by mouth 2 (two) times a day, Disp: 180 tablet, Rfl: 3    rivaroxaban (XARELTO) 2 5 mg tablet, Take 1 tablet (2 5 mg total) by mouth 2 (two) times a day with meals, Disp: 180 tablet, Rfl: 0    True Metrix Blood Glucose Test test strip, USE ONE STRIP 3 TIMES DAILY FOR BLOOD GLUCOSE TESTING  PATIENT HAS TRUE METRIX GLUCOMETER, Disp: 100 each, Rfl: 0    Advair -21 MCG/ACT inhaler, INHALE 2 PUFFS BY MOUTH TWICE DAILY   RINSE MOUTH AFTER USE (Patient not taking: No sig reported), Disp: 12 g, Rfl: 0    albuterol (2 5 mg/3 mL) 0 083 % nebulizer solution, TAKE 3 ML VIA NEBULIZER EVERY 6 (SIX) HOURS AS NEEDED FOR WHEEZING OR SHORTNESS OF BREATH (Patient not taking: Reported on 6/27/2022), Disp: 150 mL, Rfl: 5    albuterol (PROVENTIL HFA,VENTOLIN HFA) 90 mcg/act inhaler, INHALE 2 PUFFS 4 (FOUR) TIMES A DAY (Patient not taking: No sig reported), Disp: 8 5 g, Rfl: 5    Alcohol Swabs (Pharmacist Choice Alcohol) PADS, USE 3-4 TIMES AS DIRECTED   (Patient not taking: No sig reported), Disp: 100 each, Rfl: 3    aspirin 81 mg chewable tablet, Chew 1 tablet (81 mg total) daily (Patient not taking: Reported on 6/27/2022), Disp: 30 tablet, Rfl: 0    atorvastatin (LIPITOR) 40 mg tablet, , Disp: , Rfl:     Blood Glucose Monitoring Suppl (True Metrix Air Glucose Meter) w/Device KIT, use as directed (Patient not taking: Reported on 6/27/2022), Disp: 1 kit, Rfl: 0    Blood Pressure Monitoring (Blood Pressure Monitor Automat) KATLYN, Use Daily as Directed (Patient not taking: Reported on 6/27/2022), Disp: 1 Device, Rfl: 0    cetaphil (CETAPHIL) lotion, Apply topically as needed for dry skin (Patient not taking: No sig reported), Disp: 236 mL, Rfl: 3    chlorproMAZINE (THORAZINE) 100 mg tablet, Take 100 mg by mouth daily at bedtime (Patient not taking: Reported on 6/27/2022), Disp: , Rfl:     chlorproMAZINE (THORAZINE) 50 mg tablet, Take 50 mg by mouth daily at bedtime (Patient not taking: Reported on 6/27/2022), Disp: , Rfl:     Comfort FELIX Pen Needles 32G X 4 MM MISC, USE 3-4 TIMES A DAY (Patient not taking: No sig reported), Disp: , Rfl:     cyclobenzaprine (FLEXERIL) 5 mg tablet, Take 1 tablet (5 mg total) by mouth 3 (three) times a day (Patient not taking: Reported on 6/27/2022), Disp: 45 tablet, Rfl: 0    Diclofenac Sodium (VOLTAREN) 1 %, APPLY 2 G TOPICALLY 4 (FOUR) TIMES A DAY (Patient not taking: No sig reported), Disp: 100 g, Rfl: 1    Diclofenac Sodium (VOLTAREN) 1 %, APPLY 2 G TOPICALLY 4 (FOUR) TIMES A DAY (Patient not taking: No sig reported), Disp: 100 g, Rfl: 1    Dulaglutide (Trulicity) 1 5 RT/5 1ZX Norma SANTOS (Patient not taking: Reported on 6/27/2022), Disp: 2 mL, Rfl: 0    Global Inject Ease Lancets 30G MISC, USE 3 (THREE) TIMES A DAY, Disp: 100 each, Rfl: 0    hydrOXYzine HCL (ATARAX) 50 mg tablet, Take 50 mg by mouth 3 (three) times a day (Patient not taking: Reported on 6/27/2022), Disp: , Rfl:     Latuda 120 MG tablet, TAKE 1 TABLET (120 MG) BY ORAL ROUTE ONCE DAILY WITH FOOD (AT LEAST 350 CALORIES) (Patient not taking: No sig reported), Disp: , Rfl:     lidocaine (XYLOCAINE) 5 % ointment, Apply 1 application topically 3 (three) times a day (Patient not taking: Reported on 6/27/2022), Disp: , Rfl:     lurasidone (LATUDA) 80 mg tablet, Take 1 tablet (80 mg total) by mouth daily with breakfast (Patient not taking: Reported on 6/27/2022), Disp: 30 tablet, Rfl: 0    nicotine (NICODERM CQ) 21 mg/24 hr TD 24 hr patch, Place 1 patch on the skin daily (Patient not taking: Reported on 6/27/2022), Disp: 7 patch, Rfl: 0    prazosin (MINIPRESS) 1 mg capsule, Take 1 mg by mouth daily at bedtime (Patient not taking: Reported on 6/27/2022), Disp: , Rfl:     pregabalin (LYRICA) 50 mg capsule, Take 1 capsule (50 mg total) by mouth 3 (three) times a day, Disp: 270 capsule, Rfl: 0    senna-docusate sodium (SENOKOT S) 8 6-50 mg per tablet, Take 2 tablets by mouth daily at bedtime (Patient not taking: Reported on 6/27/2022), Disp: 30 tablet, Rfl: 0    Current Facility-Administered Medications:     lidocaine (LMX) 4 % cream, , Topical, Daily PRN, Gricelda Burleson MD

## 2022-06-30 PROCEDURE — 4010F ACE/ARB THERAPY RXD/TAKEN: CPT | Performed by: PHYSICIAN ASSISTANT

## 2022-07-26 DIAGNOSIS — J44.9 CHRONIC OBSTRUCTIVE PULMONARY DISEASE, UNSPECIFIED COPD TYPE (HCC): ICD-10-CM

## 2022-07-26 RX ORDER — ALBUTEROL SULFATE 90 UG/1
AEROSOL, METERED RESPIRATORY (INHALATION)
Qty: 8.5 G | Refills: 5 | Status: SHIPPED | OUTPATIENT
Start: 2022-07-26

## 2022-09-06 ENCOUNTER — TELEPHONE (OUTPATIENT)
Dept: VASCULAR SURGERY | Facility: CLINIC | Age: 37
End: 2022-09-06

## 2022-09-06 DIAGNOSIS — M79.605 LEFT LEG PAIN: ICD-10-CM

## 2022-09-06 DIAGNOSIS — I73.9 PAD (PERIPHERAL ARTERY DISEASE) (HCC): ICD-10-CM

## 2022-09-06 DIAGNOSIS — R60.0 LEG EDEMA, LEFT: ICD-10-CM

## 2022-09-06 NOTE — TELEPHONE ENCOUNTER
Called the number for the snf  Phone rang 5 times with no answer  A message then came on that stated that this call cannot be completed, please try again later  Called twice with the same response  Will call back later

## 2022-09-06 NOTE — TELEPHONE ENCOUNTER
Received a call from Lucy Nathan, nurse at [facility] [phone]  Pt has an OV with provider on 9/12/22 to review EZEKIEL and NIKKI that were done  The provider at the [facility] examined pt today  Pt has increased pain behind her left knee  Also has swelling  This area is tender to palpation  No erythema or warmth  Also has pain on the medial aspect of left ankle  Lucy Nathan is not aware if the pain is worse with ambulation, or at rest  She stated that pt has just "been complaining of constant pain"  No open wounds  Routed to triage to advise if any further testing needs to be done prior to [address] on 9/12/22

## 2022-09-07 ENCOUNTER — APPOINTMENT (EMERGENCY)
Dept: VASCULAR ULTRASOUND | Facility: HOSPITAL | Age: 37
End: 2022-09-07
Payer: OTHER GOVERNMENT

## 2022-09-07 ENCOUNTER — HOSPITAL ENCOUNTER (EMERGENCY)
Facility: HOSPITAL | Age: 37
Discharge: HOME | End: 2022-09-07
Attending: EMERGENCY MEDICINE
Payer: OTHER GOVERNMENT

## 2022-09-07 VITALS
HEART RATE: 90 BPM | RESPIRATION RATE: 18 BRPM | DIASTOLIC BLOOD PRESSURE: 65 MMHG | OXYGEN SATURATION: 100 % | TEMPERATURE: 98.1 F | SYSTOLIC BLOOD PRESSURE: 152 MMHG

## 2022-09-07 DIAGNOSIS — M79.662 PAIN AND SWELLING OF LOWER LEG, LEFT: Primary | ICD-10-CM

## 2022-09-07 DIAGNOSIS — M79.89 PAIN AND SWELLING OF LOWER LEG, LEFT: Primary | ICD-10-CM

## 2022-09-07 DIAGNOSIS — I73.9 PERIPHERAL ARTERY DISEASE (HCC): ICD-10-CM

## 2022-09-07 DIAGNOSIS — M71.22 BAKER'S CYST OF KNEE, LEFT: ICD-10-CM

## 2022-09-07 LAB
ANION GAP SERPL CALCULATED.3IONS-SCNC: 7 MMOL/L (ref 4–13)
APTT PPP: 28 SECONDS (ref 23–37)
BASOPHILS # BLD AUTO: 0.02 THOUSANDS/ΜL (ref 0–0.1)
BASOPHILS NFR BLD AUTO: 0 % (ref 0–1)
BUN SERPL-MCNC: 19 MG/DL (ref 5–25)
CALCIUM SERPL-MCNC: 9 MG/DL (ref 8.4–10.2)
CHLORIDE SERPL-SCNC: 101 MMOL/L (ref 96–108)
CK SERPL-CCNC: 104 U/L (ref 26–192)
CO2 SERPL-SCNC: 30 MMOL/L (ref 21–32)
CREAT SERPL-MCNC: 0.64 MG/DL (ref 0.6–1.3)
EOSINOPHIL # BLD AUTO: 0.12 THOUSAND/ΜL (ref 0–0.61)
EOSINOPHIL NFR BLD AUTO: 2 % (ref 0–6)
ERYTHROCYTE [DISTWIDTH] IN BLOOD BY AUTOMATED COUNT: 13.2 % (ref 11.6–15.1)
GFR SERPL CREATININE-BSD FRML MDRD: 115 ML/MIN/1.73SQ M
GLUCOSE SERPL-MCNC: 106 MG/DL (ref 65–140)
HCT VFR BLD AUTO: 36.3 % (ref 34.8–46.1)
HGB BLD-MCNC: 13.2 G/DL (ref 11.5–15.4)
IMM GRANULOCYTES # BLD AUTO: 0.02 THOUSAND/UL (ref 0–0.2)
IMM GRANULOCYTES NFR BLD AUTO: 0 % (ref 0–2)
INR PPP: 0.98 (ref 0.84–1.19)
LYMPHOCYTES # BLD AUTO: 2.15 THOUSANDS/ΜL (ref 0.6–4.47)
LYMPHOCYTES NFR BLD AUTO: 40 % (ref 14–44)
MCH RBC QN AUTO: 27.4 PG (ref 26.8–34.3)
MCHC RBC AUTO-ENTMCNC: 36.4 G/DL (ref 31.4–37.4)
MCV RBC AUTO: 76 FL (ref 82–98)
MONOCYTES # BLD AUTO: 0.35 THOUSAND/ΜL (ref 0.17–1.22)
MONOCYTES NFR BLD AUTO: 7 % (ref 4–12)
NEUTROPHILS # BLD AUTO: 2.75 THOUSANDS/ΜL (ref 1.85–7.62)
NEUTS SEG NFR BLD AUTO: 51 % (ref 43–75)
NRBC BLD AUTO-RTO: 0 /100 WBCS
PLATELET # BLD AUTO: 386 THOUSANDS/UL (ref 149–390)
PMV BLD AUTO: 9.6 FL (ref 8.9–12.7)
POTASSIUM SERPL-SCNC: 4 MMOL/L (ref 3.5–5.3)
PROTHROMBIN TIME: 13.2 SECONDS (ref 11.6–14.5)
RBC # BLD AUTO: 4.81 MILLION/UL (ref 3.81–5.12)
SODIUM SERPL-SCNC: 138 MMOL/L (ref 135–147)
WBC # BLD AUTO: 5.41 THOUSAND/UL (ref 4.31–10.16)

## 2022-09-07 PROCEDURE — 85730 THROMBOPLASTIN TIME PARTIAL: CPT | Performed by: EMERGENCY MEDICINE

## 2022-09-07 PROCEDURE — 36415 COLL VENOUS BLD VENIPUNCTURE: CPT | Performed by: EMERGENCY MEDICINE

## 2022-09-07 PROCEDURE — 80048 BASIC METABOLIC PNL TOTAL CA: CPT | Performed by: EMERGENCY MEDICINE

## 2022-09-07 PROCEDURE — 85610 PROTHROMBIN TIME: CPT | Performed by: EMERGENCY MEDICINE

## 2022-09-07 PROCEDURE — 99284 EMERGENCY DEPT VISIT MOD MDM: CPT | Performed by: EMERGENCY MEDICINE

## 2022-09-07 PROCEDURE — 93971 EXTREMITY STUDY: CPT

## 2022-09-07 PROCEDURE — 93926 LOWER EXTREMITY STUDY: CPT

## 2022-09-07 PROCEDURE — 82550 ASSAY OF CK (CPK): CPT | Performed by: EMERGENCY MEDICINE

## 2022-09-07 PROCEDURE — 99284 EMERGENCY DEPT VISIT MOD MDM: CPT

## 2022-09-07 PROCEDURE — 85025 COMPLETE CBC W/AUTO DIFF WBC: CPT | Performed by: EMERGENCY MEDICINE

## 2022-09-07 PROCEDURE — 99214 OFFICE O/P EST MOD 30 MIN: CPT | Performed by: PHYSICIAN ASSISTANT

## 2022-09-07 RX ORDER — ASPIRIN 81 MG/1
81 TABLET, CHEWABLE ORAL DAILY
Qty: 30 TABLET | Refills: 0 | Status: SHIPPED | OUTPATIENT
Start: 2022-09-07

## 2022-09-07 NOTE — CONSULTS
Inpatient consult to Vascular Surgery  Consult performed by: Shira Verma PA-C  Consult ordered by: Stefanie Dean DO          Consultation -Vascular Surgery  Rafa Post 29 y o  female MRN: [id number]  Unit/Bed#: ED-04 Encounter: [id number]      ASSESSMENT:  30 yo F with PAD s/p L SFA/popliteal intervention, stent placement (8/2021) with recurrent stenosis now s/p Left CFA to BK pop bypass with insitu GSV (9/14/21-Anastasiya) who presents with LLE pain, swelling      Plan:  - f/u formal LEADs, LEVDs result  Per vascular tech- bypass is patent  There is no acute arterial or venous pathology  Appears to be a bakers cyst per vascular tech  - no acute vascular intervention warranted at this time  - dispo per ED  - rec continuing xarelto, statin  rec continuing asa  Discussed this with ED, who will write rx for ASA  - f/u in the office as scheduled 9/12      Reason for Consult / Principal Problem: L leg pain, swelling    HPI: Rafa Post is a 29y o  year old female PMH HTN, DM2 with neuropathy, bipolar disorder, obesity, COPD, hx of cocaine use, PAD (on xarelto,asa, statin) s/p L SFA/popliteal intervention, stent placement (8/2021) with recurrent stenosis now s/p Left CFA to BK pop bypass with insitu GSV (9/14/21-Anastasiya) who presents with LLE pain, swelling since Friday  Pt states pain started Friday, continues to worsen  Located behind knee and along left lateral lower leg  Admits to burning sensation, numbness in lateral left leg  Pt is currently incarcerated  States she received her xarelto this am, but prior to this did not receive it for 4 days  Also states she has not been getting her asa at the senior living  Review of Systems   Constitutional: Negative for chills and fever  Respiratory: Negative for shortness of breath  Cardiovascular: Negative for chest pain  Musculoskeletal:        Left leg pain, swelling   Skin: Negative for color change and pallor  Neurological: Positive for numbness     All other systems reviewed and are negative  Historical Information   Past Medical History:   Diagnosis Date    Asthma     [facility]     COPD [facility]chronic obstructive pulmonary disease) ([facility] )     Depression     Diabetes mellitus ([facility] )     Hypertension     Psychiatric disorder     cutting history    PTSD (post-traumatic stress disorder)     Tendonitis      Past Surgical History:   Procedure Laterality Date    BYPASS FEMORAL-POPLITEAL Left 2021    Procedure: Left Common Femoral Below Knee to Popliteal Bypass with Insitu GSV graft  Left Lower Extremity Angiogram;  Surgeon: Sara Lacy MD;  [facility]: [facility];  Service: Vascular     SECTION      EAR SURGERY      IR LOWER EXTREMITY ANGIOGRAM  2021    IR LOWER EXTREMITY ANGIOGRAM  2021     Social History   Social History     Substance and Sexual Activity   Alcohol Use Not Currently    Alcohol/week: 1 0 standard drink    Types: 1 Shots of liquor per week    Comment: 'I would drink whatever "     Social History     Substance and Sexual Activity   Drug Use Yes    Types: Cocaine, Marijuana, "Crack" cocaine    Comment: 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     Social History     Tobacco Use   Smoking Status Former Smoker    Packs/day: 1 50    Years: 17 00    Pack years: 25 50    Types: Cigarettes   Smokeless Tobacco Former User    Quit date: 2022   Tobacco Comment    pt is down to 4 cigarettes a day      Family History   Problem Relation Age of Onset    Hypertension Mother     Diabetes Mother     HIV Mother     Heart disease Mother     No Known Problems Father        Meds/Allergies     (Not in a hospital admission)    Current Facility-Administered Medications   Medication Dose Route Frequency    lidocaine (LMX) 4 % cream   Topical Daily PRN       No Known Allergies    Objective     Blood pressure 138/93, pulse 91, temperature 98 1 °F (36 7 °C), temperature source Oral, resp   rate 18, last menstrual period 2022, SpO2 100 %, not currently breastfeeding  No intake or output data in the 24 hours ending 09/07/22 1404    PHYSICAL EXAM  Physical Exam  Vitals and nursing note reviewed  Constitutional:       General: She is not in acute distress  Appearance: Normal appearance  She is normal weight  She is not ill-appearing, toxic-appearing or diaphoretic  HENT:      Head: Normocephalic and atraumatic  Cardiovascular:      Rate and Rhythm: Normal rate  Pulmonary:      Effort: Pulmonary effort is normal  No respiratory distress  Musculoskeletal:      Comments: Minimal LLE edema  Incisions well healed, no dehiscence  Motor intact  Sensation decreased, but states this is her baseline  Tender to palpation mainly behind knee, also lateral lower leg   Skin:     General: Skin is warm and dry  Capillary Refill: Capillary refill takes less than 2 seconds  Neurological:      General: No focal deficit present  Mental Status: She is alert and oriented to person, place, and time  Psychiatric:         Mood and Affect: Mood normal          Behavior: Behavior normal        LLE DP, PT, pop strong biphasic signals present  LLE Bypass signal present    Lab Results:   I have personally reviewed pertinent lab results    , CBC:   Lab Results   Component Value Date    WBC 5 41 09/07/2022    HGB 13 2 09/07/2022    HCT 36 3 09/07/2022    MCV 76 (L) 09/07/2022     09/07/2022    MCH 27 4 09/07/2022    MCHC 36 4 09/07/2022    RDW 13 2 09/07/2022    MPV 9 6 09/07/2022    NRBC 0 09/07/2022   , CMP:   Lab Results   Component Value Date    SODIUM 138 09/07/2022    K 4 0 09/07/2022     09/07/2022    CO2 30 09/07/2022    BUN 19 09/07/2022    CREATININE 0 64 09/07/2022    CALCIUM 9 0 09/07/2022    EGFR 115 09/07/2022   , Coagulation:   Lab Results   Component Value Date    INR 0 98 09/07/2022   , Urinalysis: No results found for: Giulia Beltran 27, [address] [facility], LEUKOCYTESUR, NITRITE, [address] [facility], Linnell Castleman, Wilda Carreon  Imaging: I have personally reviewed pertinent reports  EKG, Pathology, and Other Studies: I have personally reviewed pertinent reports  Counseling / Coordination of Care  Total time spent today  30 minutes  Greater than 50% of total time was spent with the patient and / or family counseling and / or coordination of care           Scott Storm PA-C  9/7/2022 2:04 PM

## 2022-09-07 NOTE — ED NOTES
2220 pt up from ed, srom at 18weeks and 4 days, light bleeding noted. Pt has marc of early losses.      2225 to room 6     2228 bp taken
Provided PT with lunchbox  No other needs at this time        Mer Tristan [facility]  09/07/22 2858
US at bedside      Francine Emmanuel RN  09/07/22 7519
no

## 2022-09-07 NOTE — DISCHARGE INSTRUCTIONS
Patient is medically cleared for incarceration  Follow-up with orthopedics for re-evaluation of left Baker cyst   Is also recommended that you follow-up with your vascular surgeon for continued surveillance of your peripheral vascular disease  Continue to take 81 mg of aspirin daily as recommended by your vascular surgeon

## 2022-09-07 NOTE — TELEPHONE ENCOUNTER
Spoke with Karmen Dawkins and made her aware that an arterial and venous doppler of the left leg is recommended  Karmen Dawkins stated that the correctional provider will be there shortly  She is going to discuss the recommendation with him, and work out how the pt can have this done before her scheduled OV  Karmen Dawkins stated that they do not need any orders faxed to them at this time

## 2022-09-07 NOTE — ED PROVIDER NOTES
History  Chief Complaint   Patient presents with    Leg Swelling     Pt arrives from [facility] facility c/o LLE swelling and posterior pain  Sent for doppler study     81-year-old female presents the emergency department for evaluation of left lower leg pain  Patient states the pain has been present for the past 5 days and has gradually worsened in intensity  She also reports associated swelling  Patient states that she does have neuropathy in the left foot at baseline and has diminished sensation  She has a history of peripheral vascular disease and had 3 procedures last year:    2021-09-14 Left CFA to BK popliteal bypass graft with insitu GSV  2021-08-25 Left SFA stent angioplasty and popliteal stent placement  2021-08-11 Left SFA stent placement x 2    She reports being compliant with her Xarelto but notes that she does not always receive her aspirin  Patient is currently residing in [facility] detention  She denies injury to the leg  She denies fevers or chills  Patient denies skin changes or redness  She states that her right leg is without symptoms  History provided by:  Patient and medical records  Leg Pain  Location:  Leg  Time since incident:  5 days  Leg location:  L lower leg  Pain details:     Quality:  Dull, aching and pressure    Radiates to:  Does not radiate    Severity:  Moderate    Onset quality:  Gradual    Duration:  5 days    Timing:  Constant    Progression:  Unchanged  Chronicity:  New  Dislocation: no    Foreign body present:  No foreign bodies  Prior injury to area:  No  Relieved by:  Nothing  Worsened by:  Nothing  Ineffective treatments:  None tried  Associated symptoms: stiffness and swelling    Associated symptoms: no back pain, no decreased ROM and no fever    Risk factors: no recent illness        Prior to Admission Medications   Prescriptions Last Dose Informant Patient Reported? Taking?    ARIPiprazole (ABILIFY) 10 mg tablet  Self Yes No Sig: HALF A TAB FOR ONE WEEK THEN ONE TAB DAILY AT BEDTIME   Advair -21 MCG/ACT inhaler Not Taking at Unknown time Self No No   Sig: INHALE 2 PUFFS BY MOUTH TWICE DAILY   RINSE MOUTH AFTER USE   Patient not taking: No sig reported   Alcohol Swabs (Pharmacist Choice Alcohol) PADS Not Taking at Unknown time Self No No   Sig: USE 3-4 TIMES AS DIRECTED     Patient not taking: No sig reported   BD Pen Needle Micro U/F 32G X 6 MM MISC  Self No No   Sig: use 1 NEEDLE to inject MEDICATION subcutaneously twice a day   Blood Glucose Monitoring Suppl (True Metrix Air Glucose Meter) w/Device KIT Not Taking at Unknown time  No No   Sig: use as directed   Patient not taking: No sig reported   Blood Pressure Monitoring (Blood Pressure Monitor Automat) KATLYN Not Taking at Unknown time  No No   Sig: Use Daily as Directed   Patient not taking: No sig reported   Comfort EZ Pen Needles 32G X 4 MM MISC Not Taking at Unknown time Self Yes No   Sig: USE 3-4 TIMES A DAY   Patient not taking: No sig reported   Diclofenac Sodium (VOLTAREN) 1 % Not Taking at Unknown time Self No No   Sig: APPLY 2 G TOPICALLY 4 (FOUR) TIMES A DAY   Patient not taking: No sig reported   Diclofenac Sodium (VOLTAREN) 1 % Not Taking at Unknown time Self No No   Sig: APPLY 2 G TOPICALLY 4 (FOUR) TIMES A DAY   Patient not taking: No sig reported   Dulaglutide (Trulicity) 1 5 WL/6 0KF SOPN Not Taking at Unknown time  No No   Sig: Tony Roberto   Patient not taking: No sig reported   Global Inject Ease Lancets 30G MISC  Self No No   Sig: USE 3 (THREE) TIMES A DAY   Lancet Devices (Lancing Device) MISC  Self No No   Sig: USE AS DIRECTED   Lantus SoloStar 100 units/mL injection pen  Self No No   Sig: INJECT 40 UNITS UNDER THE SKIN DAILY   Latuda 120 MG tablet Not Taking at Unknown time Self Yes No   Sig: TAKE 1 TABLET (120 MG) BY ORAL ROUTE ONCE DAILY WITH FOOD (AT LEAST 350 CALORIES)   Patient not taking: No sig reported   True Metrix Blood Glucose Test test strip  Self No No   Sig: USE ONE STRIP 3 TIMES DAILY FOR BLOOD GLUCOSE TESTING   PATIENT HAS TRUE METRIX GLUCOMETER   acetaminophen (TYLENOL) 325 mg tablet  Self No No   Sig: Take 2 tablets (650 mg total) by mouth every 6 (six) hours as needed for mild pain   albuterol (2 5 mg/3 mL) 0 083 % nebulizer solution Not Taking at Unknown time  No No   Sig: TAKE 3 ML VIA NEBULIZER EVERY 6 (SIX) HOURS AS NEEDED FOR WHEEZING OR SHORTNESS OF BREATH   Patient not taking: No sig reported   albuterol (PROVENTIL HFA,VENTOLIN HFA) 90 mcg/act inhaler   No No   Sig: INHALE 2 PUFFS 4 (FOUR) TIMES A DAY   aspirin 81 mg chewable tablet Not Taking at Unknown time  No No   Sig: Chew 1 tablet (81 mg total) daily   Patient not taking: No sig reported   aspirin 81 mg chewable tablet   No Yes   Sig: Chew 1 tablet (81 mg total) daily   atorvastatin (LIPITOR) 40 mg tablet Not Taking at Unknown time  Yes No   Patient not taking: No sig reported   cetaphil (CETAPHIL) lotion Not Taking at Unknown time Self No No   Sig: Apply topically as needed for dry skin   Patient not taking: No sig reported   chlorproMAZINE (THORAZINE) 100 mg tablet Not Taking at Unknown time  Yes No   Sig: Take 100 mg by mouth daily at bedtime   Patient not taking: No sig reported   chlorproMAZINE (THORAZINE) 50 mg tablet Not Taking at Unknown time  Yes No   Sig: Take 50 mg by mouth daily at bedtime   Patient not taking: No sig reported   cyclobenzaprine (FLEXERIL) 5 mg tablet Not Taking at Unknown time  No No   Sig: Take 1 tablet (5 mg total) by mouth 3 (three) times a day   Patient not taking: No sig reported   hydrOXYzine HCL (ATARAX) 50 mg tablet Not Taking at Unknown time  Yes No   Sig: Take 50 mg by mouth 3 (three) times a day   Patient not taking: No sig reported   hydrOXYzine pamoate (VISTARIL) 50 mg capsule  Self Yes No   Sig: TAKE ONE (1) CAPSULE BY MOUTH 3 TIMES A DAY   lidocaine (XYLOCAINE) 5 % ointment Not Taking at Unknown time  Yes No   Sig: Apply 1 application topically 3 (three) times a day   Patient not taking: No sig reported   lisinopril (ZESTRIL) 10 mg tablet  Self No No   Sig: TAKE TWO (2) TABLETS BY MOUTH DAILY   lurasidone (LATUDA) 80 mg tablet Not Taking at Unknown time  No No   Sig: Take 1 tablet (80 mg total) by mouth daily with breakfast   Patient not taking: No sig reported   metFORMIN (GLUCOPHAGE) 1000 MG tablet  Self No No   Sig: TAKE 1 TABLET (1,000 MG TOTAL) BY MOUTH 2 (TWO) TIMES A DAY WITH MEALS   nicotine (NICODERM CQ) 21 mg/24 hr TD 24 hr patch Not Taking at Unknown time  No No   Sig: Place 1 patch on the skin daily   Patient not taking: No sig reported   prazosin (MINIPRESS) 1 mg capsule  Self Yes No   Sig: Take 1 mg by mouth daily at bedtime   Patient not taking: Reported on 6/27/2022   pregabalin (LYRICA) 50 mg capsule   No No   Sig: Take 1 capsule (50 mg total) by mouth 3 (three) times a day   rivaroxaban (XARELTO) 2 5 mg tablet  Self No No   Sig: Take 1 tablet (2 5 mg total) by mouth 2 (two) times a day with meals   rivaroxaban (Xarelto) 2 5 mg tablet  Self No No   Sig: Take 1 tablet (2 5 mg total) by mouth 2 (two) times a day   senna-docusate sodium (SENOKOT S) 8 6-50 mg per tablet Not Taking at Unknown time  No No   Sig: Take 2 tablets by mouth daily at bedtime   Patient not taking: No sig reported      Facility-Administered Medications Last Administration Doses Remaining   lidocaine (LMX) 4 % cream None recorded           Past Medical History:   Diagnosis Date    Asthma     Bipolar 1 disorder (HCC)     COPD (chronic obstructive pulmonary disease) (HCC)     Depression     Diabetes mellitus (HCC)     Hypertension     Psychiatric disorder     cutting history    PTSD (post-traumatic stress disorder)     Tendonitis        Past Surgical History:   Procedure Laterality Date    BYPASS FEMORAL-POPLITEAL Left 9/14/2021    Procedure: Left Common Femoral Below Knee to Popliteal Bypass with Insitu GSV graft    Left Lower Extremity Angiogram;  Surgeon: Keshav Marino MD;  [facility]: [facility];  Service: Vascular     SECTION      EAR SURGERY      IR LOWER EXTREMITY ANGIOGRAM  2021    IR LOWER EXTREMITY ANGIOGRAM  2021       Family History   Problem Relation Age of Onset    Hypertension Mother     Diabetes Mother     HIV Mother     Heart disease Mother     No Known Problems Father      I have reviewed and agree with the history as documented  E-Cigarette/Vaping    E-Cigarette Use Never User      E-Cigarette/Vaping Substances    Nicotine No     THC No     CBD No     Flavoring No     Other No     Unknown No      Social History     Tobacco Use    Smoking status: Former Smoker     Packs/day: 1 50     Years: 17 00     Pack years: 25 50     Types: Cigarettes    Smokeless tobacco: Former User     Quit date: 2022    Tobacco comment: pt is down to 4 cigarettes a day    Vaping Use    Vaping Use: Never used   Substance Use Topics    Alcohol use: Not Currently     Alcohol/week: 1 0 standard drink     Types: 1 Shots of liquor per week     Comment: 'I would drink whatever "    Drug use: Yes     Types: Cocaine, Marijuana, "Crack" cocaine     Comment: 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       Review of Systems   Constitutional: Negative for fever  Gastrointestinal: Negative for nausea and vomiting  Musculoskeletal: Positive for stiffness  Negative for back pain and gait problem  Skin: Negative for pallor, rash and wound  All other systems reviewed and are negative  Physical Exam  Physical Exam  Vitals reviewed  Constitutional:       General: She is not in acute distress  Appearance: She is well-developed  She is not ill-appearing  HENT:      Head: Normocephalic  Right Ear: External ear normal       Left Ear: External ear normal       Nose: Nose normal  No rhinorrhea  Mouth/Throat:      Pharynx: No oropharyngeal exudate  Eyes:      General: No scleral icterus       Conjunctiva/sclera: Conjunctivae normal       Pupils: Pupils are equal, round, and reactive to light  Cardiovascular:      Rate and Rhythm: Normal rate and regular rhythm  Heart sounds: Normal heart sounds  Pulmonary:      Effort: Pulmonary effort is normal       Breath sounds: Normal breath sounds  Abdominal:      General: Bowel sounds are normal  There is no distension  Palpations: Abdomen is soft  Tenderness: There is no abdominal tenderness  There is no guarding or rebound  Musculoskeletal:         General: No tenderness or deformity  Normal range of motion  Cervical back: Normal range of motion and neck supple  Left lower leg: Edema present  Legs:       Comments: Posterior tibial and dorsal pedal pulses are not palpable but were present with Doppler  The left calf and foot is warm, without delayed capillary refill  Lymphadenopathy:      Cervical: No cervical adenopathy  Skin:     General: Skin is warm and dry  Capillary Refill: Capillary refill takes less than 2 seconds  Findings: No bruising, erythema or rash  Neurological:      Mental Status: She is alert and oriented to person, place, and time  Cranial Nerves: No cranial nerve deficit  Sensory: No sensory deficit  Motor: No abnormal muscle tone  Coordination: Coordination normal       Gait: Gait normal       Deep Tendon Reflexes: Reflexes are normal and symmetric  Psychiatric:         Behavior: Behavior normal          Thought Content:  Thought content normal          Judgment: Judgment normal          Vital Signs  ED Triage Vitals [09/07/22 1258]   Temperature Pulse Respirations Blood Pressure SpO2   98 1 °F (36 7 °C) 91 18 138/93 100 %      Temp Source Heart Rate Source Patient Position - Orthostatic VS BP Location FiO2 (%)   Oral Monitor Sitting Left arm --      Pain Score       --           Vitals:    09/07/22 1258 09/07/22 1445   BP: 138/93 152/65   Pulse: 91 90   Patient Position - Orthostatic VS: Sitting          Visual Acuity      ED Medications  Medications - No data to display    Diagnostic Studies  Results Reviewed     Procedure Component Value Units Date/Time    CK Total with Reflex CKMB [[id number]]  (Normal) Collected: 09/07/22 1337    Lab Status: Final result Specimen: Blood from Arm, Right Updated: 09/07/22 1403     Total  U/L     Basic metabolic panel [[id number]] Collected: 09/07/22 1337    Lab Status: Final result Specimen: Blood from Arm, Right Updated: 09/07/22 1403     Sodium 138 mmol/L      Potassium 4 0 mmol/L      Chloride 101 mmol/L      CO2 30 mmol/L      ANION GAP 7 mmol/L      BUN 19 mg/dL      Creatinine 0 64 mg/dL      Glucose 106 mg/dL      Calcium 9 0 mg/dL      eGFR 115 ml/min/1 73sq m     Narrative:      [place] guidelines for Chronic Kidney Disease (CKD):     Stage 1 with normal or high GFR (GFR > 90 mL/min/1 73 square meters)    Stage 2 Mild CKD (GFR = 60-89 mL/min/1 73 square meters)    Stage 3A Moderate CKD (GFR = 45-59 mL/min/1 73 square meters)    Stage 3B Moderate CKD (GFR = 30-44 mL/min/1 73 square meters)    Stage 4 Severe CKD (GFR = 15-29 mL/min/1 73 square meters)    Stage 5 End Stage CKD (GFR <15 mL/min/1 73 square meters)  Note: GFR calculation is accurate only with a steady state creatinine    Protime-INR [[id number]]  (Normal) Collected: 09/07/22 1337    Lab Status: Final result Specimen: Blood from Arm, Right Updated: 09/07/22 1356     Protime 13 2 seconds      INR 0 98    APTT [[id number]]  (Normal) Collected: 09/07/22 1337    Lab Status: Final result Specimen: Blood from Arm, Right Updated: 09/07/22 1356     PTT 28 seconds     CBC and differential [[id number]]  (Abnormal) Collected: 09/07/22 1337    Lab Status: Final result Specimen: Blood from Arm, Right Updated: 09/07/22 1348     WBC 5 41 Thousand/uL      RBC 4 81 Million/uL      Hemoglobin 13 2 g/dL      Hematocrit 36 3 %      MCV 76 fL      MCH 27 4 pg      MCHC 36 4 g/dL RDW 13 2 %      MPV 9 6 fL      Platelets 919 Thousands/uL      nRBC 0 /100 WBCs      Neutrophils Relative 51 %      Immat GRANS % 0 %      Lymphocytes Relative 40 %      Monocytes Relative 7 %      Eosinophils Relative 2 %      Basophils Relative 0 %      Neutrophils Absolute 2 75 Thousands/µL      Immature Grans Absolute 0 02 Thousand/uL      Lymphocytes Absolute 2 15 Thousands/µL      Monocytes Absolute 0 35 Thousand/µL      Eosinophils Absolute 0 12 Thousand/µL      Basophils Absolute 0 02 Thousands/µL                  VAS lower limb venous duplex study, unilateral/limited    (Results Pending)   VAS lower limb arterial duplex, limited/unilateral    (Results Pending)              Procedures  Procedures         ED Course                               SBIRT 22yo+    Flowsheet Row Most Recent Value   SBIRT (23 yo +)    In order to provide better care to our patients, we are screening all of our patients for alcohol and drug use  Would it be okay to ask you these screening questions? Unable to answer at this time Filed at: 09/07/2022 1305                    MDM  Number of Diagnoses or Management Options  Baker's cyst of knee, left: new and requires workup  Pain and swelling of lower leg, left: new and requires workup     Amount and/or Complexity of Data Reviewed  Clinical lab tests: ordered and reviewed  Tests in the radiology section of CPT®: ordered and reviewed  Decide to obtain previous medical records or to obtain history from someone other than the patient: yes  Discuss the patient with other providers: yes  Independent visualization of images, tracings, or specimens: yes    Risk of Complications, Morbidity, and/or Mortality  General comments: 41-year-old female presents with left lower extremity pain and swelling that has developed over the past 5 days  Patient does have a history significant for prior arterial occlusion  She has been compliant with Xarelto    Lower extremity venous duplex is negative for acute DVT  Arterial studies show patent grafts and adequate flow  Patient does plan to follow-up with her vascular surgeon in 1 week when she is released from [facility]  Patient did have a Baker's cyst noted on duplex study  It is possible this may have ruptured and caused some increased calf pain and swelling as her pain is poorly localized and does not seem consistent with claudication at this time  Patient encouraged to take her aspirin as recommended by her vascular surgeon  We discussed signs and symptoms return to the emergency department  Patient felt comfortable with discharge plan  Patient Progress  Patient progress: stable      Disposition  Final diagnoses:   Pain and swelling of lower leg, left   Baker's cyst of knee, left     Time reflects when diagnosis was documented in both MDM as applicable and the Disposition within this note     Time User Action Codes Description Comment    9/7/2022  3:28 PM Naman Monk Add [M74 204,  M79 89] Pain and swelling of lower leg, left     9/7/2022  3:29 PM Candida Sanabria Add [M71 22] Baker's cyst of knee, left     9/7/2022  3:34 PM Candida Sanabria [I73 9] Peripheral artery disease [facility])       ED Disposition     ED Disposition   Discharge    Condition   Stable    Date/Time   Wed Sep 7, 2022  3:28 PM    [facility] discharge to home/self care                 Follow-up Information     Follow up With Specialties Details Why Contact Info Additional [address] [facility] Orthopedic Surgery Schedule an appointment as soon as possible for a visit in 2 days For recheck of current symptoms [address] [facility] [address] [place] [address] [facility] [address], [address] [facility] Becky Mock MD Vascular Surgery, Radiology In 1 week For recheck of current symptoms [address]  [address] [facility] [address]  [phone]             Discharge Medication List as of 9/7/2022  3:36 PM      CONTINUE these medications which have CHANGED    Details   aspirin 81 mg chewable tablet Chew 1 tablet (81 mg total) daily, Starting Wed 9/7/2022, Normal         CONTINUE these medications which have NOT CHANGED    Details   acetaminophen (TYLENOL) 325 mg tablet Take 2 tablets (650 mg total) by mouth every 6 (six) hours as needed for mild pain, Starting Fri 8/27/2021, No Print      Advair -21 MCG/ACT inhaler INHALE 2 PUFFS BY MOUTH TWICE DAILY   RINSE MOUTH AFTER USE, Normal      albuterol (2 5 mg/3 mL) 0 083 % nebulizer solution TAKE 3 ML VIA NEBULIZER EVERY 6 (SIX) HOURS AS NEEDED FOR WHEEZING OR SHORTNESS OF BREATH, Normal      albuterol (PROVENTIL HFA,VENTOLIN HFA) 90 mcg/act inhaler INHALE 2 PUFFS 4 (FOUR) TIMES A DAY, Normal      Alcohol Swabs (Pharmacist Choice Alcohol) PADS USE 3-4 TIMES AS DIRECTED , Normal      ARIPiprazole (ABILIFY) 10 mg tablet HALF A TAB FOR ONE WEEK THEN ONE TAB DAILY AT BEDTIME, Historical Med      atorvastatin (LIPITOR) 40 mg tablet Starting Sat 8/8/2020, Historical Med      !! BD Pen Needle Micro U/F 32G X 6 MM MISC use 1 NEEDLE to inject MEDICATION subcutaneously twice a day, Normal      Blood Glucose Monitoring Suppl (True Metrix Air Glucose Meter) w/Device KIT use as directed, Normal      Blood Pressure Monitoring (Blood Pressure Monitor Automat) KATLYN Use Daily as Directed, Print      cetaphil (CETAPHIL) lotion Apply topically as needed for dry skin, Starting Tue 12/14/2021, Normal      !! chlorproMAZINE (THORAZINE) 100 mg tablet Take 100 mg by mouth daily at bedtime, Starting Wed 11/18/2020, Historical Med      !! chlorproMAZINE (THORAZINE) 50 mg tablet Take 50 mg by mouth daily at bedtime, Starting Fri 10/22/2021, Historical Med      !!  Comfort EZ Pen Needles 32G X 4 MM MISC USE 3-4 TIMES A DAY, Historical Med      cyclobenzaprine (FLEXERIL) 5 mg tablet Take 1 tablet (5 mg total) by mouth 3 (three) times a day, Starting Fri 9/17/2021, Normal      !! Diclofenac Sodium (VOLTAREN) 1 % APPLY 2 G TOPICALLY 4 (FOUR) TIMES A DAY, Starting Tue 4/26/2022, Normal      !! Diclofenac Sodium (VOLTAREN) 1 % APPLY 2 G TOPICALLY 4 (FOUR) TIMES A DAY, Starting Tue 4/26/2022, Normal      Dulaglutide (Trulicity) 1 5 FT/3 5FM JENNN Tony Roberto, Print      Global Inject Ease Lancets 30G MISC USE 3 (THREE) TIMES A DAY, Starting Tue 2/22/2022, Normal      hydrOXYzine HCL (ATARAX) 50 mg tablet Take 50 mg by mouth 3 (three) times a day, Starting Wed 11/18/2020, Historical Med      hydrOXYzine pamoate (VISTARIL) 50 mg capsule TAKE ONE (1) CAPSULE BY MOUTH 3 TIMES A DAY, Historical Med      Lancet Devices (Lancing Device) MISC USE AS DIRECTED, Normal      Lantus SoloStar 100 units/mL injection pen INJECT 40 UNITS UNDER THE SKIN DAILY, Starting Thu 4/28/2022, Normal      !!  Latuda 120 MG tablet TAKE 1 TABLET (120 MG) BY ORAL ROUTE ONCE DAILY WITH FOOD (AT LEAST 350 CALORIES), Historical Med      lidocaine (XYLOCAINE) 5 % ointment Apply 1 application topically 3 (three) times a day, Starting Fri 8/6/2021, Historical Med      lisinopril (ZESTRIL) 10 mg tablet TAKE TWO (2) TABLETS BY MOUTH DAILY, Normal      !! lurasidone (LATUDA) 80 mg tablet Take 1 tablet (80 mg total) by mouth daily with breakfast, Starting Sat 8/28/2021, Normal      metFORMIN (GLUCOPHAGE) 1000 MG tablet TAKE 1 TABLET (1,000 MG TOTAL) BY MOUTH 2 (TWO) TIMES A DAY WITH MEALS, Starting Wed 5/25/2022, Until Mon 6/27/2022, Normal      nicotine (NICODERM CQ) 21 mg/24 hr TD 24 hr patch Place 1 patch on the skin daily, Starting Sat 8/28/2021, Normal      prazosin (MINIPRESS) 1 mg capsule Take 1 mg by mouth daily at bedtime, Starting Wed 11/18/2020, Historical Med      pregabalin (LYRICA) 50 mg capsule Take 1 capsule (50 mg total) by mouth 3 (three) times a day, Starting Thu 3/24/2022, Until Wed 6/22/2022, Normal      !! rivaroxaban (Xarelto) 2 5 mg tablet Take 1 tablet (2 5 mg total) by mouth 2 (two) times a day, Starting Tue 12/14/2021, Until Fri 12/9/2022, Normal      !! rivaroxaban (XARELTO) 2 5 mg tablet Take 1 tablet (2 5 mg total) by mouth 2 (two) times a day with meals, Starting Thu 3/24/2022, Normal      senna-docusate sodium (SENOKOT S) 8 6-50 mg per tablet Take 2 tablets by mouth daily at bedtime, Starting Fri 9/17/2021, Normal      True Metrix Blood Glucose Test test strip USE ONE STRIP 3 TIMES DAILY FOR BLOOD GLUCOSE TESTING  PATIENT HAS TRUE METRIX GLUCOMETER, Normal       !! - Potential duplicate medications found  Please discuss with provider  No discharge procedures on file      PDMP Review       Value Time User    PDMP Reviewed  Yes 10/11/2021  9:13 AM Maggie Xavier PA-C          ED Provider  Electronically Signed by           Rosio Chávez DO  09/07/22 2024

## 2022-09-08 PROCEDURE — 93971 EXTREMITY STUDY: CPT | Performed by: SURGERY

## 2022-09-08 PROCEDURE — 93926 LOWER EXTREMITY STUDY: CPT | Performed by: SURGERY

## 2022-09-08 PROCEDURE — 93922 UPR/L XTREMITY ART 2 LEVELS: CPT | Performed by: SURGERY

## 2022-09-12 ENCOUNTER — OFFICE VISIT (OUTPATIENT)
Dept: VASCULAR SURGERY | Facility: CLINIC | Age: 37
End: 2022-09-12
Payer: OTHER GOVERNMENT

## 2022-09-12 VITALS
DIASTOLIC BLOOD PRESSURE: 64 MMHG | SYSTOLIC BLOOD PRESSURE: 116 MMHG | HEIGHT: 61 IN | BODY MASS INDEX: 49.28 KG/M2 | HEART RATE: 88 BPM | WEIGHT: 261 LBS

## 2022-09-12 DIAGNOSIS — M79.604 BILATERAL LEG PAIN: ICD-10-CM

## 2022-09-12 DIAGNOSIS — J44.1 CHRONIC OBSTRUCTIVE PULMONARY DISEASE WITH ACUTE EXACERBATION (HCC): ICD-10-CM

## 2022-09-12 DIAGNOSIS — I10 ESSENTIAL HYPERTENSION: ICD-10-CM

## 2022-09-12 DIAGNOSIS — I73.9 PAD (PERIPHERAL ARTERY DISEASE) (HCC): ICD-10-CM

## 2022-09-12 DIAGNOSIS — F17.200 NICOTINE DEPENDENCE, UNCOMPLICATED, UNSPECIFIED NICOTINE PRODUCT TYPE: ICD-10-CM

## 2022-09-12 DIAGNOSIS — E11.42 TYPE 2 DIABETES MELLITUS WITH DIABETIC POLYNEUROPATHY, WITH LONG-TERM CURRENT USE OF INSULIN (HCC): ICD-10-CM

## 2022-09-12 DIAGNOSIS — I70.202 ATHEROSCLEROSIS OF LEFT LEG (HCC): Primary | ICD-10-CM

## 2022-09-12 DIAGNOSIS — M79.605 BILATERAL LEG PAIN: ICD-10-CM

## 2022-09-12 DIAGNOSIS — I73.9 PVD (PERIPHERAL VASCULAR DISEASE) (HCC): ICD-10-CM

## 2022-09-12 DIAGNOSIS — Z79.4 TYPE 2 DIABETES MELLITUS WITH DIABETIC POLYNEUROPATHY, WITH LONG-TERM CURRENT USE OF INSULIN (HCC): ICD-10-CM

## 2022-09-12 PROCEDURE — 99213 OFFICE O/P EST LOW 20 MIN: CPT | Performed by: PHYSICIAN ASSISTANT

## 2022-09-12 NOTE — PATIENT INSTRUCTIONS
Atherosclerosis lower extremities s/p left lower extremity bypass  PVD (peripheral vascular disease) ([facility])  -     rivaroxaban (Xarelto) 2 5 mg tablet; Take 1 tablet (2 5 mg total) by mouth 2 (two) times a day      Review of lower extremity arterial duplex study performed on 09/07/2022 is stable  The left lower extremity common femoral to below-knee popliteal bypass is open  The NIKKI/blood flow in the leg is normal with healing pressures above the healing range for diabetic  The lower extremity arterial study is stable  Incidentally, there was noted to be a nonvascular structure in the popliteal fossa which is consistent with a Baker cyst   She may benefit from referral to orthopedic surgeon  We discussed the critical importance of continued aspirin and rivaroxaban 2 5 twice daily, as well as diabetic control in order to keep the bypass open  -follow-up in 6 months with lower extremity arterial duplex study      -continue with optimal medical therapy on aspirin 81, atorvastatin 40 and rivaroxaban 2 5 BID  -maintain good blood pressure, cholesterol and diabetes mellitus control  -continued smoking cessation  -consider orthopedic evaluation for Baker cyst which may be causes pain in the popliteal fossa/ behind the knee  -follow-up lower extremity arterial duplex study in 6 months with office visit        Basilio Cyst  -     Ambulatory Referral to Orthopedic Surgery;  Future    -recommend rest, elevation and ice  -due to artery/plaquing in the legs and blood thinners, avoid NSAIDs (ibuprofen and such)

## 2022-09-12 NOTE — PROGRESS NOTES
Assessment/Plan:    Atherosclerosis lower extremities s/p left lower extremity bypass  PAD (peripheral artery disease) ([facility])  -     rivaroxaban (Xarelto) 2 5 mg tablet; Take 1 tablet (2 5 mg total) by mouth 2 (two) times a dayry; Future        -     VAS lower extremity arterial duplex study; Future    -1 week of constant pain behind the left knee associated with mild LLE and ankle edema  -no foot pain, ischemic rest pain or wounds     EZEKIEL 9/7/22:    R 0 98/14/83 normal PPG    L 0 95/68/59; dampened PPG       Patent CFA - Bk pop bypass without signfiicant stenosis       non-vascular structure in the popliteal fossa     LEV 9/7/22: LLE shows no acute or chronic DVT  No SVT  Discussion:   Patient with 1 week of constant pain, aching and tenderness in the popliteal fossa associated with leg edema  She would a venous duplex study on 09/07/2022 which was negative for deep vein thrombosis  She does have atherosclerosis of the lower extremities for which follow-up EZEKIEL was performed on 09/07/2022 and is stable  The left common femoral to below-knee popliteal bypass is patent  There is known elevated velocities in the proximal anastomosis which we are monitoring  The left NIKKI is normal with healing pressures above the healing range for a diabetic  We discussed the critical importance of continued aspirin and rivaroxaban 2 5 twice daily, as well as diabetic control in order to keep the bypass open  She is going to be released from [facility] this week  I reminded her of the critical importance of continuing medical therapy with aspirin and Xarelto 2 5 b i d  in order to keep the bypass open  A script for Xarelto 2 5 b i d  was sent to her local pharmacy  If she is any difficulty obtaining Xarelto, then she is asked to call the office further instructions  She was also advised to continue with smoking cessation and good diabetes mellitus control    She should check her feet every day for any wounds or changes  Patient has constant pain behind the knee  Venous duplex is negative for deep vein thrombosis in her lower extremity arterial duplex study is stable  She has no ischemic or exertional symptoms  Incidentally, there was noted to be a nonvascular structure in the popliteal fossa which is consistent with a Baker cyst   She may benefit from referral to orthopedic surgeon  We will plan for 6 month follow up EZEKIEL and office visit      -continue with optimal medical therapy on aspirin 81, atorvastatin 40 and rivaroxaban 2 5 BID  -maintain good blood pressure, cholesterol and diabetes mellitus control  -continued smoking cessation  -follow up PCP +/- orthopedic evaluation for Baker cyst which may be cause of pain in the popliteal fossa  -follow-up lower extremity arterial duplex study in 6 months with office visit      Basilio Cyst  -     Ambulatory Referral to Orthopedic Surgery; Future  -conservative measures are recommended: rest, elevation and ice  -due to PAD and blood thinners, avoid NSAIDs (ibuprofen and such)  -follow-up with PCP +/- ortho       Additional diagnoses:    Atherosclerosis of left leg ([address] )    Baker Cyst  -     Ambulatory Referral to Orthopedic Surgery; Future    BMI 45 0-49 9, adult (HCC)    Chronic obstructive pulmonary disease with acute exacerbation ([facility])    Essential hypertension    Nicotine dependence, uncomplicated, unspecified nicotine product type    Type 2 diabetes mellitus with diabetic polyneuropathy, with long-term current use of insulin ([address] )      Subjective:      Patient ID: Benja Rodriguez is a 29 y o  female  Patient presents today to review EZEKIEL done 9/7  Patient admits to b/l leg pain and cramping at times  States that cramping is sometimes worse while lying in bed  She also admits to b/l leg and ankle swelling       HPI   Benja Rodriguez is a 29 y o  female PAD with L SFA occlusion and rest pain/hx L 5th metatarsal tissue loss, s/p L SFA stent 8/11/21 w/recurrent thrombosis, s/p L CFA-BK pop bypass w/in situ GSV 9/14/21 (RALPH Flaherty ) She has had intermittent shocks through her leg that is significantly improved after bypass  She also has had numbness along her skin incision in the leg that is also improved  She states that she is compliant with Xarelto  6/27/22: Ms Tl [place] is currently incarcerated and presents to the office for vascular follow up accompanied by 2   She had her LLE bypass August 2021  After bypass, she was able to heal 5th metatarsal wound  She states that the wound healed but she was left with a callous over the area  She also had reduction of pain in the 5th met/toe  In the past couple of months she has developed increased pain in the same area with pain and standing and walking  She states that she quit smoking 2 months ago  She underwent Lower extremity arterial duplex study 5/31/22 which shows patent common femoral to below-knee popliteal bypass with 50-75% stenosis of the proximal anastomosis  There was also a branch of the bypass graft the proximal mid thigh  Triphasic waveforms noted at the ankle  NIKKI 1 01 but metatarsal pressure has dropped to 41 and great toe pressures 0 compared with prior EZEKIEL which also showed patent bypass with proximal anastomosis stenosis, metatarsal and great toe pressures were noted 118 and 116 respectively  There may be a drop in the met/gtp  However, her examination does not appear consistent with the current doppler  Her foot is tender only over the 5th met where she has a callous  I explained that I do not think that she needs arteriogram at this time  I recommend work up and local care for the callous by podiatry  If she does not heal, then we could consider additional vascular procedures  9/12/22:  Patient complains of 1 week of constant pain and tenderness behind her L knee with increased L ankle/LLE swelling  She had repeat vascular testing performed and was seen in the ED 2 days ago  She reports some discomfort around the ankle which he is to swelling  The leg and foot shocks as well as numbness in the legs is overall resolved  She is no open wounds  No ischemic rest pain  We reviewed her arterial study which is stable  The left lower extremity bypass remains patent  Bilateral lower extremity ABIs remain in the normal range with healing pressures above the healing range and a diabetic  [facility]  I reminded her of the critical importance of continuing medical therapy with aspirin and Xarelto 2 5 b i d  in order to keep the bypass open  A script for Xarelto 2 5 b i d  was sent to her local pharmacy  If she is any difficulty obtaining Xarelto, then she is asked to call the office further instructions  She was also advised to continue with smoking cessation and good diabetes mellitus control  She should check her feet every day for any wounds or changes  I have asked her to follow-up with her PCP +/-orthopedics regarding Baker cyst         VAS lower extremity arterial duplex 9/7/2022  Operative History:  2021-09-14 Left CFA to BK popliteal bypass graft with insitu GSV  2021-08-25 Left SFA stent angioplasty and popliteal stent placement  2021-08-11 Left SFA stent placement x 2  Risk Factors  The patient has history of HTN, Diabetes (IDDM), PAD and previous smoking (quit  <1yr ago)  Clinical  Right Pressure:  152/ mm Hg, Left Pressure:  159/ mm Hg  FINDINGS:     Left                   Waveform  PSV (cm/s)  EDV    Post  Tibial           Biphasic                     Ant   Tibial            Biphasic                     Inflow Anastomosis                      269   11    High Thigh (Graft)                       73    0    Mid Thigh (Graft)                       104    7    Low Thigh (Graft)                        80    0    Near Knee (Graft)                        86    0    Outflow Anastomosis                      81    5    Common Femoral Artery                   178   27    Prox Profunda                           133   19    Dist Post Tibial                         47    4    Dist  Ant  Tibial                        21    8    Dist Peroneal                            42    6         CONCLUSION:  Impression:     RIGHT LOWER LIMB LIMITED:  Ankle/Brachial index: 0 98, within the normal range  (Prior 1 05)  Metatarsal pressure of 148 mmHg (Prior 120 mmHg)  Great toe pressure of 83 mmHg, within healing range for a diabetic  PVR/ PPG tracings are normal      LEFT LOWER LIMB:  Elevated velocities noted in the proximal anastomosis of the CFA-BK POP bypass  graft without evidence of significant stenosis  Ankle/Brachial index: 0 95, which is in the normal range  (Prior 0 97)  Metatarsal pressure of 68 mmHg (Prior 92mmHg)  Great toe pressure of 59 mmHg, within healing range for a diabetic  PVR/ PPG tracings are dampened  There is a well defined hypoechoic non-vascularized cystic-type structure noted  in the popliteal fossa  Compared to previous NIKKI study on 6/27/2022 , there is no significant change  LEV 9/7/22  Operative History:  2021-09-14 Left CFA to BK popliteal bypass graft with insitu GSV  2021-08-25 Left SFA stent angioplasty and popliteal stent placement  2021-08-11 Left SFA stent placement x 2  Risk Factors  The patient has history of HTN, Diabetes (IDDM), PAD and previous smoking (quit  <1yr ago)  CONCLUSION:  Impression:     RIGHT LOWER LIMB LIMITED:  Evaluation shows no evidence of thrombus in the common femoral vein  Doppler evaluation shows a normal response to augmentation maneuvers  LEFT LOWER LIMB:  No evidence of acute or chronic deep vein thrombosis  No evidence of superficial thrombophlebitis noted  Doppler evaluation shows a normal response to augmentation maneuvers  Posterior tibial and anterior tibial arterial Doppler waveforms are biphasic      The following portions of the patient's history were reviewed and updated as appropriate: allergies, current medications, past family history, past medical history, past social history, past surgical history and problem list     Review of Systems   Constitutional: Negative  HENT: Negative  Eyes: Negative  Respiratory: Positive for shortness of breath  Cardiovascular: Negative  Gastrointestinal: Negative  Endocrine: Negative  Genitourinary: Negative  Musculoskeletal:        Leg pain    Skin: Negative  Allergic/Immunologic: Negative  Neurological: Negative  Hematological: Negative  Psychiatric/Behavioral: Negative  Objective:      /64 (BP Location: Right arm, Patient Position: Sitting)   Pulse 88   Ht 5' 1" (1 549 m)   Wt 118 kg (261 lb)   LMP 08/30/2022 (Approximate)   BMI 49 32 kg/m²   No carotid bruits  LLE bypass is clinically patent  Physical Exam  Vitals and nursing note reviewed  Constitutional:       Appearance: She is well-developed  She is obese  HENT:      Head: Normocephalic and atraumatic  Eyes:      Pupils: Pupils are equal, round, and reactive to light  Neck:      Thyroid: No thyromegaly  Vascular: No JVD  Trachea: Trachea normal    Cardiovascular:      Rate and Rhythm: Normal rate and regular rhythm  Pulses:           Carotid pulses are 2+ on the right side and 2+ on the left side  Radial pulses are 2+ on the right side and 2+ on the left side  Dorsalis pedis pulses are 2+ on the right side and detected w/ Doppler on the left side  Posterior tibial pulses are detected w/ Doppler on the left side  Heart sounds: Normal heart sounds, S1 normal and S2 normal  No murmur heard  No friction rub  No gallop  Comments:     LLE palpable bypass pulse in the lower leg  Excellent biphasic PT signal and good distal DP signal      No open wounds  L lateral foot slight callous  Cap refill < 2 seconds bilaterally      Pulmonary:      Effort: Pulmonary effort is normal  No accessory muscle usage or respiratory distress  Breath sounds: Normal breath sounds  No wheezing or rales  Abdominal:      General: Bowel sounds are normal  There is no distension  Palpations: Abdomen is soft  Tenderness: There is no abdominal tenderness  Musculoskeletal:         General: No deformity  Normal range of motion  Cervical back: Neck supple  Right lower leg: Edema present  Left lower leg: Edema present  Skin:     General: Skin is warm and dry  Capillary Refill: Capillary refill takes less than 2 seconds  Findings: No lesion or rash  Nails: There is no clubbing  Neurological:      Mental Status: She is alert and oriented to person, place, and time  Comments: Grossly normal    Psychiatric:         Behavior: Behavior is cooperative  I have reviewed and made appropriate changes to the review of systems input by the medical assistant      Vitals:    09/12/22 0831   BP: 116/64   BP Location: Right arm   Patient Position: Sitting   Pulse: 88   Weight: 118 kg (261 lb)   Height: 5' 1" (1 549 m)       Patient Active Problem List   Diagnosis    Headache    Essential hypertension    Type 2 diabetes mellitus with diabetic polyneuropathy, with long-term current use of insulin ([facility])    Paresthesia    COPD (chronic obstructive pulmonary disease) ([facility])    Nicotine dependence    Bipolar disorder ([facility])    Leg pain    Bilateral leg pain    PAD (peripheral artery disease) ([facility])    BMI 45 0-49 9, adult ([facility])    Bursitis of left knee    Left leg pain    Atherosclerosis of left leg ([facility])    Positive D dimer    Surgical wound breakdown, sequela    Class 3 severe obesity due to excess calories without serious comorbidity with body mass index (BMI) of 40 0 to 44 9 in adult [facility])       Past Surgical History:   Procedure Laterality Date    BYPASS FEMORAL-POPLITEAL Left 9/14/2021    Procedure: Left Common Femoral Below Knee to Popliteal Bypass with Insitu GSV graft    Left Lower Extremity Angiogram;  Surgeon: Tracy Pedro MD;  [facility]: [facility];  Service: Vascular     SECTION      EAR SURGERY      IR LOWER EXTREMITY ANGIOGRAM  2021    IR LOWER EXTREMITY ANGIOGRAM  2021       Family History   Problem Relation Age of Onset    Hypertension Mother     Diabetes Mother     HIV Mother     Heart disease Mother     No Known Problems Father        Social History     Socioeconomic History    Marital status: /Civil Union     Spouse name: Not on file    Number of children: Not on file    Years of education: Not on file    Highest education level: Not on file   Occupational History    Not on file   Tobacco Use    Smoking status: Former Smoker     Packs/day: 1 50     Years: 17 00     Pack years: 25 50     Types: Cigarettes    Smokeless tobacco: Former User     Quit date: 2022    Tobacco comment: pt is down to 4 cigarettes a day    Vaping Use    Vaping Use: Never used   Substance and Sexual Activity    Alcohol use: Not Currently     Alcohol/week: 1 0 standard drink     Types: 1 Shots of liquor per week     Comment: 'I would drink whatever "    Drug use: Yes     Types: Cocaine, Marijuana, "Crack" cocaine     Comment: 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    Sexual activity: Yes     Partners: Female, Male     Birth control/protection: None, Condom   Other Topics Concern    Not on file   Social History Narrative    Not on file     Social Determinants of Health     Financial Resource Strain: Not on file   Food Insecurity: Not on file   Transportation Needs: Not on file   Physical Activity: Not on file   Stress: Not on file   Social Connections: Not on file   Intimate Partner Violence: Not on file   Housing Stability: Not on file       No Known Allergies      Current Outpatient Medications:     acetaminophen (TYLENOL) 325 mg tablet, Take 2 tablets (650 mg total) by mouth every 6 (six) hours as needed for mild pain, Disp: , Rfl: 0    ARIPiprazole (ABILIFY) 10 mg tablet, HALF A TAB FOR ONE WEEK THEN ONE TAB DAILY AT BEDTIME, Disp: , Rfl:     aspirin 81 mg chewable tablet, Chew 1 tablet (81 mg total) daily, Disp: 30 tablet, Rfl: 0    BD Pen Needle Micro U/F 32G X 6 MM MISC, use 1 NEEDLE to inject MEDICATION subcutaneously twice a day, Disp: 100 each, Rfl: 0    hydrOXYzine pamoate (VISTARIL) 50 mg capsule, TAKE ONE (1) CAPSULE BY MOUTH 3 TIMES A DAY, Disp: , Rfl:     Lancet Devices (Lancing Device) MISC, USE AS DIRECTED, Disp: 1 each, Rfl: 0    Lantus SoloStar 100 units/mL injection pen, INJECT 40 UNITS UNDER THE SKIN DAILY, Disp: 15 mL, Rfl: 0    lisinopril (ZESTRIL) 10 mg tablet, TAKE TWO (2) TABLETS BY MOUTH DAILY, Disp: 30 tablet, Rfl: 0    metFORMIN (GLUCOPHAGE) 1000 MG tablet, TAKE 1 TABLET (1,000 MG TOTAL) BY MOUTH 2 (TWO) TIMES A DAY WITH MEALS, Disp: 60 tablet, Rfl: 0    pregabalin (LYRICA) 50 mg capsule, Take 1 capsule (50 mg total) by mouth 3 (three) times a day, Disp: 270 capsule, Rfl: 0    rivaroxaban (Xarelto) 2 5 mg tablet, Take 1 tablet (2 5 mg total) by mouth 2 (two) times a day, Disp: 180 tablet, Rfl: 3    True Metrix Blood Glucose Test test strip, USE ONE STRIP 3 TIMES DAILY FOR BLOOD GLUCOSE TESTING  PATIENT HAS TRUE METRIX GLUCOMETER, Disp: 100 each, Rfl: 0    Advair -21 MCG/ACT inhaler, INHALE 2 PUFFS BY MOUTH TWICE DAILY   RINSE MOUTH AFTER USE (Patient not taking: No sig reported), Disp: 12 g, Rfl: 0    albuterol (2 5 mg/3 mL) 0 083 % nebulizer solution, TAKE 3 ML VIA NEBULIZER EVERY 6 (SIX) HOURS AS NEEDED FOR WHEEZING OR SHORTNESS OF BREATH (Patient not taking: No sig reported), Disp: 150 mL, Rfl: 5    albuterol (PROVENTIL HFA,VENTOLIN HFA) 90 mcg/act inhaler, INHALE 2 PUFFS 4 (FOUR) TIMES A DAY (Patient not taking: No sig reported), Disp: 8 5 g, Rfl: 5    Alcohol Swabs (Pharmacist Choice Alcohol) PADS, USE 3-4 TIMES AS DIRECTED   (Patient not taking: No sig reported), Disp: 100 each, Rfl: 3    atorvastatin (LIPITOR) 40 mg tablet, , Disp: , Rfl:     Blood Glucose Monitoring Suppl (True Metrix Air Glucose Meter) w/Device KIT, use as directed (Patient not taking: No sig reported), Disp: 1 kit, Rfl: 0    Blood Pressure Monitoring (Blood Pressure Monitor Automat) KATLYN, Use Daily as Directed (Patient not taking: No sig reported), Disp: 1 Device, Rfl: 0    cetaphil (CETAPHIL) lotion, Apply topically as needed for dry skin (Patient not taking: No sig reported), Disp: 236 mL, Rfl: 3    chlorproMAZINE (THORAZINE) 100 mg tablet, Take 100 mg by mouth daily at bedtime (Patient not taking: No sig reported), Disp: , Rfl:     chlorproMAZINE (THORAZINE) 50 mg tablet, Take 50 mg by mouth daily at bedtime (Patient not taking: No sig reported), Disp: , Rfl:     Comfort EZ Pen Needles 32G X 4 MM MISC, USE 3-4 TIMES A DAY (Patient not taking: No sig reported), Disp: , Rfl:     cyclobenzaprine (FLEXERIL) 5 mg tablet, Take 1 tablet (5 mg total) by mouth 3 (three) times a day (Patient not taking: No sig reported), Disp: 45 tablet, Rfl: 0    Diclofenac Sodium (VOLTAREN) 1 %, APPLY 2 G TOPICALLY 4 (FOUR) TIMES A DAY (Patient not taking: No sig reported), Disp: 100 g, Rfl: 1    Diclofenac Sodium (VOLTAREN) 1 %, APPLY 2 G TOPICALLY 4 (FOUR) TIMES A DAY (Patient not taking: No sig reported), Disp: 100 g, Rfl: 1    Dulaglutide (Trulicity) 1 5 OE/2 9MZ Tony SANTOS (Patient not taking: No sig reported), Disp: 2 mL, Rfl: 0    Global Inject Ease Lancets 30G MISC, USE 3 (THREE) TIMES A DAY (Patient not taking: Reported on 9/12/2022), Disp: 100 each, Rfl: 0    hydrOXYzine HCL (ATARAX) 50 mg tablet, Take 50 mg by mouth 3 (three) times a day (Patient not taking: No sig reported), Disp: , Rfl:     Latuda 120 MG tablet, TAKE 1 TABLET (120 MG) BY ORAL ROUTE ONCE DAILY WITH FOOD (AT LEAST 350 CALORIES) (Patient not taking: No sig reported), Disp: , Rfl:     lidocaine (XYLOCAINE) 5 % ointment, Apply 1 application topically 3 (three) times a day (Patient not taking: No sig reported), Disp: , Rfl:     lurasidone (LATUDA) 80 mg tablet, Take 1 tablet (80 mg total) by mouth daily with breakfast (Patient not taking: No sig reported), Disp: 30 tablet, Rfl: 0    nicotine (NICODERM CQ) 21 mg/24 hr TD 24 hr patch, Place 1 patch on the skin daily (Patient not taking: No sig reported), Disp: 7 patch, Rfl: 0    prazosin (MINIPRESS) 1 mg capsule, Take 1 mg by mouth daily at bedtime (Patient not taking: No sig reported), Disp: , Rfl:     rivaroxaban (XARELTO) 2 5 mg tablet, Take 1 tablet (2 5 mg total) by mouth 2 (two) times a day with meals, Disp: 180 tablet, Rfl: 0    senna-docusate sodium (SENOKOT S) 8 6-50 mg per tablet, Take 2 tablets by mouth daily at bedtime (Patient not taking: No sig reported), Disp: 30 tablet, Rfl: 0    Current Facility-Administered Medications:     lidocaine (LMX) 4 % cream, , Topical, Daily PRN, Carolin Adkins MD

## 2022-10-31 DIAGNOSIS — E11.9 DIABETES (HCC): ICD-10-CM

## 2022-10-31 DIAGNOSIS — E11.65 UNCONTROLLED TYPE 2 DIABETES MELLITUS WITH HYPERGLYCEMIA (HCC): ICD-10-CM

## 2022-10-31 DIAGNOSIS — I10 HYPERTENSION: ICD-10-CM

## 2022-11-08 NOTE — TELEPHONE ENCOUNTER
Patient was supposed to come in for appt on Friday 11/4 but did not show up  Please review and advise, thank you!

## 2022-11-08 NOTE — TELEPHONE ENCOUNTER
She apparently was incarcerated and was why she had not been coming in    No excuse for the NO SHOW 11/4  I am uncomfortable prescribing yet do not want her to be without BP and DM medication  (I assume she was getting these while in prison)  Unless had tests in prison will need at least an A1C and probably others that can be ordered at visit  Please call e and find out what is up?!?

## 2022-11-09 RX ORDER — PEN NEEDLE, DIABETIC 32 GX 1/4"
NEEDLE, DISPOSABLE MISCELLANEOUS
Qty: 100 EACH | Refills: 0 | Status: SHIPPED | OUTPATIENT
Start: 2022-11-09

## 2022-11-09 RX ORDER — INSULIN GLARGINE 100 [IU]/ML
40 INJECTION, SOLUTION SUBCUTANEOUS DAILY
Qty: 15 ML | Refills: 0 | Status: SHIPPED | OUTPATIENT
Start: 2022-11-09

## 2022-11-09 RX ORDER — LISINOPRIL 20 MG/1
20 TABLET ORAL DAILY
Qty: 30 TABLET | Refills: 0 | Status: SHIPPED | OUTPATIENT
Start: 2022-11-09

## 2022-11-09 NOTE — TELEPHONE ENCOUNTER
Called and spoke with patient, she is currently living in [place] and has a hard time getting to the office  Appointment scheduled 12/6/2022 with Dr Verner Santos

## 2022-12-06 DIAGNOSIS — E11.9 DIABETES (HCC): ICD-10-CM

## 2022-12-06 DIAGNOSIS — I10 HYPERTENSION: ICD-10-CM

## 2022-12-06 RX ORDER — LISINOPRIL 20 MG/1
20 TABLET ORAL DAILY
Qty: 30 TABLET | Refills: 0 | Status: SHIPPED | OUTPATIENT
Start: 2022-12-06

## 2022-12-06 NOTE — TELEPHONE ENCOUNTER
Pt has not yet seen a provider since Dr Sushma Hester left  Missed her appt today  Please review meds for refill

## 2022-12-06 NOTE — TELEPHONE ENCOUNTER
Patient called to move her appt to earlier time thinking her appt was later, so patient missed her 10am appt  She is rescheduled for 12/20

## 2022-12-06 NOTE — TELEPHONE ENCOUNTER
Please reach out to Mary Tristan to see what kept her from making today's appointment so that we can help decide the best next step

## 2022-12-20 ENCOUNTER — OFFICE VISIT (OUTPATIENT)
Dept: FAMILY MEDICINE CLINIC | Facility: CLINIC | Age: 37
End: 2022-12-20

## 2022-12-20 VITALS
SYSTOLIC BLOOD PRESSURE: 122 MMHG | DIASTOLIC BLOOD PRESSURE: 84 MMHG | BODY MASS INDEX: 52.49 KG/M2 | WEIGHT: 278 LBS | TEMPERATURE: 97.7 F | HEIGHT: 61 IN | HEART RATE: 97 BPM | OXYGEN SATURATION: 98 %

## 2022-12-20 DIAGNOSIS — E11.65 UNCONTROLLED TYPE 2 DIABETES MELLITUS WITH HYPERGLYCEMIA (HCC): ICD-10-CM

## 2022-12-20 DIAGNOSIS — E11.42 TYPE 2 DIABETES MELLITUS WITH DIABETIC POLYNEUROPATHY, WITH LONG-TERM CURRENT USE OF INSULIN (HCC): Primary | ICD-10-CM

## 2022-12-20 DIAGNOSIS — M79.605 LEFT LEG PAIN: ICD-10-CM

## 2022-12-20 DIAGNOSIS — Z79.4 TYPE 2 DIABETES MELLITUS WITH DIABETIC POLYNEUROPATHY, WITH LONG-TERM CURRENT USE OF INSULIN (HCC): Primary | ICD-10-CM

## 2022-12-20 LAB — SL AMB POCT HEMOGLOBIN AIC: 9.8 (ref ?–6.5)

## 2022-12-20 RX ORDER — DULAGLUTIDE 1.5 MG/.5ML
INJECTION, SOLUTION SUBCUTANEOUS
Qty: 2 ML | Refills: 0 | Status: SHIPPED | OUTPATIENT
Start: 2022-12-20

## 2022-12-20 RX ORDER — PEN NEEDLE, DIABETIC 32 GX 1/4"
NEEDLE, DISPOSABLE MISCELLANEOUS
Qty: 100 EACH | Refills: 0 | Status: SHIPPED | OUTPATIENT
Start: 2022-12-20

## 2022-12-20 NOTE — ASSESSMENT & PLAN NOTE
Currently no signs that this is thrombotic or infectious  DDx neuralgia paraesthetica vs musculoskeletal pain    - Advised NSAID treatment of discomfort  - Will reassess in 2weeks at follow up  - Call or return if symptoms worsen

## 2022-12-20 NOTE — PROGRESS NOTES
DM Follow Up  Restart Trulicity    Name: Maria Elena Zhang      : 1985      MRN: [id number]  Encounter Provider: Eleazar Dance, MD  Encounter Date: 2022   Encounter department: [facility]    Assessment & Plan     1  Type 2 diabetes mellitus with diabetic polyneuropathy, with long-term current use of insulin (HCC)  Assessment & Plan:  Currently poorly controlled on Lantus 20mg qAM and 15mg qPM  Would like to start trulicity  Is taking metformin  Lab Results   Component Value Date    HGBA1C 9 8 (A) 2022     - Will restart trulicity 2 3MI weekly  - Switch lantus to 20mg qPM, none in AM  - Return in 2 weeks    Orders:  -     POCT hemoglobin A1c    2  Uncontrolled type 2 diabetes mellitus with hyperglycemia (HCC)  -     Dulaglutide (Trulicity) 1 5 LK/ 7CK SOPN; Tony Roberto    3  Left leg pain  Assessment & Plan:  Currently no signs that this is thrombotic or infectious  DDx neuralgia paraesthetica vs musculoskeletal pain  - Advised NSAID treatment of discomfort  - Will reassess in 2weeks at follow up  - Call or return if symptoms worsen      BMI Counseling: Body mass index is 52 53 kg/m²  The BMI is above normal  Nutrition recommendations include limiting drinks that contain sugar  Rationale for BMI follow-up plan is due to patient being overweight or obese  Subjective      L lateral leg pain new this AM  No recent illnesses, no changes in routine, did not sleep on L side  Pt is taking Lantus 20mg in MA and 15mg in PM as recommended by the physician 3 months ago  She is also taking metformin  No longer on trulicity and not taking short acting insulin  She reports she has the ability to monitor her blood sugars at home, and frequently finds an AM sugar in the 300s  She is having trouble controlling her current diet due to living situation  Review of Systems   Constitutional: Negative for chills and fever     HENT: Negative for ear pain and sore throat  Eyes: Negative for pain and visual disturbance  Respiratory: Negative for cough and shortness of breath  Cardiovascular: Negative for chest pain and palpitations  Gastrointestinal: Negative for abdominal pain and vomiting  Genitourinary: Negative for dysuria and hematuria  Musculoskeletal: Negative for arthralgias and back pain  Left lateral leg pain from mid-thigh to the knee   Skin: Negative for color change and rash  Neurological: Negative for seizures and syncope  Current Outpatient Medications on File Prior to Visit   Medication Sig   • Advair -21 MCG/ACT inhaler INHALE 2 PUFFS BY MOUTH TWICE DAILY    RINSE MOUTH AFTER USE   • aspirin 81 mg chewable tablet Chew 1 tablet (81 mg total) daily   • Comfort EZ Pen Needles 32G X 4 MM MISC USE 3-4 TIMES A DAY   • Insulin Glargine Solostar (Lantus SoloStar) 100 UNIT/ML SOPN Inject 0 4 mL (40 Units total) under the skin daily   • Lancet Devices (Lancing Device) MISC USE AS DIRECTED   • lisinopril (ZESTRIL) 20 mg tablet TAKE 1 TABLET (20 MG TOTAL) BY MOUTH DAILY   • metFORMIN (GLUCOPHAGE) 1000 MG tablet TAKE ONE (1) TABLET BY MOUTH TWICE DAILY WITH FOOD   • OneTouch Ultra test strip USE 2-3 TIMES A DAY   • rivaroxaban (XARELTO) 2 5 mg tablet Take 1 tablet (2 5 mg total) by mouth 2 (two) times a day with meals   • [DISCONTINUED] Insulin Pen Needle (BD Pen Needle Micro U/F) 32G X 6 MM MISC Inject under the skin 3 (three) times a day before meals   • acetaminophen (TYLENOL) 325 mg tablet Take 2 tablets (650 mg total) by mouth every 6 (six) hours as needed for mild pain (Patient not taking: Reported on 12/20/2022)   • albuterol (2 5 mg/3 mL) 0 083 % nebulizer solution TAKE 3 ML VIA NEBULIZER EVERY 6 (SIX) HOURS AS NEEDED FOR WHEEZING OR SHORTNESS OF BREATH (Patient not taking: No sig reported)   • albuterol (PROVENTIL HFA,VENTOLIN HFA) 90 mcg/act inhaler INHALE 2 PUFFS 4 (FOUR) TIMES A DAY (Patient not taking: No sig reported)   • Alcohol Swabs (Pharmacist Choice Alcohol) PADS USE 3-4 TIMES AS DIRECTED     • ARIPiprazole (ABILIFY) 10 mg tablet HALF A TAB FOR ONE WEEK THEN ONE TAB DAILY AT BEDTIME (Patient not taking: Reported on 12/20/2022)   • atorvastatin (LIPITOR) 40 mg tablet  (Patient not taking: No sig reported)   • Blood Glucose Monitoring Suppl (True Metrix Air Glucose Meter) w/Device KIT use as directed (Patient not taking: No sig reported)   • Blood Pressure Monitoring (Blood Pressure Monitor Automat) KATLYN Use Daily as Directed (Patient not taking: No sig reported)   • cetaphil (CETAPHIL) lotion Apply topically as needed for dry skin (Patient not taking: No sig reported)   • chlorproMAZINE (THORAZINE) 100 mg tablet Take 100 mg by mouth daily at bedtime (Patient not taking: No sig reported)   • chlorproMAZINE (THORAZINE) 50 mg tablet Take 50 mg by mouth daily at bedtime (Patient not taking: No sig reported)   • cyclobenzaprine (FLEXERIL) 5 mg tablet Take 1 tablet (5 mg total) by mouth 3 (three) times a day (Patient not taking: No sig reported)   • Diclofenac Sodium (VOLTAREN) 1 % APPLY 2 G TOPICALLY 4 (FOUR) TIMES A DAY (Patient not taking: No sig reported)   • Diclofenac Sodium (VOLTAREN) 1 % APPLY 2 G TOPICALLY 4 (FOUR) TIMES A DAY (Patient not taking: No sig reported)   • Global Inject Ease Lancets 30G MISC USE 3 (THREE) TIMES A DAY (Patient not taking: Reported on 9/12/2022)   • hydrOXYzine HCL (ATARAX) 50 mg tablet Take 50 mg by mouth 3 (three) times a day (Patient not taking: No sig reported)   • hydrOXYzine pamoate (VISTARIL) 50 mg capsule TAKE ONE (1) CAPSULE BY MOUTH 3 TIMES A DAY (Patient not taking: Reported on 12/20/2022)   • Latuda 120 MG tablet TAKE 1 TABLET (120 MG) BY ORAL ROUTE ONCE DAILY WITH FOOD (AT LEAST 350 CALORIES) (Patient not taking: No sig reported)   • lidocaine (XYLOCAINE) 5 % ointment Apply 1 application topically 3 (three) times a day (Patient not taking: No sig reported)   • lisinopril (ZESTRIL) 10 mg tablet TAKE TWO (2) TABLETS BY MOUTH DAILY (Patient not taking: Reported on 12/20/2022)   • lurasidone (LATUDA) 80 mg tablet Take 1 tablet (80 mg total) by mouth daily with breakfast (Patient not taking: No sig reported)   • nicotine (NICODERM CQ) 21 mg/24 hr TD 24 hr patch Place 1 patch on the skin daily (Patient not taking: No sig reported)   • prazosin (MINIPRESS) 1 mg capsule Take 1 mg by mouth daily at bedtime (Patient not taking: No sig reported)   • pregabalin (LYRICA) 50 mg capsule Take 1 capsule (50 mg total) by mouth 3 (three) times a day (Patient not taking: Reported on 12/20/2022)   • rivaroxaban (Xarelto) 2 5 mg tablet Take 1 tablet (2 5 mg total) by mouth 2 (two) times a day (Patient not taking: Reported on 12/20/2022)   • senna-docusate sodium (SENOKOT S) 8 6-50 mg per tablet Take 2 tablets by mouth daily at bedtime (Patient not taking: No sig reported)   • [DISCONTINUED] Dulaglutide (Trulicity) 1 5 IJ/2 5OB SOPN Tony Roberto (Patient not taking: No sig reported)       Objective     /84   Pulse 97   Temp 97 7 °F (36 5 °C)   Ht 5' 1" (1 549 m)   Wt 126 kg (278 lb)   SpO2 98%   BMI 52 53 kg/m²     Physical Exam  Constitutional:       General: She is not in acute distress  Appearance: She is not ill-appearing  HENT:      Head: Normocephalic and atraumatic  Mouth/Throat:      Mouth: Mucous membranes are moist       Pharynx: Oropharynx is clear  Eyes:      General:         Right eye: No discharge  Left eye: No discharge  Conjunctiva/sclera: Conjunctivae normal    Cardiovascular:      Rate and Rhythm: Normal rate and regular rhythm  Heart sounds: No murmur heard  No friction rub  No gallop  Pulmonary:      Effort: Pulmonary effort is normal  No respiratory distress  Breath sounds: Normal breath sounds  Abdominal:      General: Bowel sounds are normal       Palpations: Abdomen is soft  There is no mass  Tenderness:  There is no abdominal tenderness  There is no right CVA tenderness, left CVA tenderness or guarding  Musculoskeletal:         General: No tenderness or deformity  Cervical back: Neck supple  No tenderness  Comments: 12inch by Hayden Antunez area of tenderness of L lateral thigh about midthigh to knee, with distal loss of sensation (which is consistent with patients baseline), sensation in foot intact, distal pulses intact  No overlying skin changes, no heat, no edema  Mild edema of BL feet and ankles consistent with patient baseline  Lymphadenopathy:      Cervical: No cervical adenopathy  Skin:     General: Skin is warm and dry  Coloration: Skin is not jaundiced  Neurological:      Mental Status: She is alert and oriented to person, place, and time     Psychiatric:         Mood and Affect: Mood normal        Lefty Lorenz MD

## 2022-12-20 NOTE — ASSESSMENT & PLAN NOTE
Currently poorly controlled on Lantus 20mg qAM and 15mg qPM  Would like to start trulicity  Is taking metformin      Lab Results   Component Value Date    HGBA1C 9 8 (A) 12/20/2022     - Will restart trulicity 5 0VQ weekly  - Switch lantus to 20mg qPM, none in AM  - Return in 2 weeks

## 2022-12-27 DIAGNOSIS — I10 HYPERTENSION: ICD-10-CM

## 2022-12-27 DIAGNOSIS — E11.9 DIABETES (HCC): ICD-10-CM

## 2022-12-27 RX ORDER — LISINOPRIL 20 MG/1
20 TABLET ORAL DAILY
Qty: 30 TABLET | Refills: 0 | Status: SHIPPED | OUTPATIENT
Start: 2022-12-27

## 2023-01-04 ENCOUNTER — OFFICE VISIT (OUTPATIENT)
Dept: FAMILY MEDICINE CLINIC | Facility: CLINIC | Age: 38
End: 2023-01-04

## 2023-01-04 VITALS
DIASTOLIC BLOOD PRESSURE: 84 MMHG | TEMPERATURE: 98.8 F | SYSTOLIC BLOOD PRESSURE: 122 MMHG | HEIGHT: 61 IN | BODY MASS INDEX: 53.24 KG/M2 | HEART RATE: 96 BPM | OXYGEN SATURATION: 99 % | WEIGHT: 282 LBS

## 2023-01-04 DIAGNOSIS — E11.42 TYPE 2 DIABETES MELLITUS WITH DIABETIC POLYNEUROPATHY, WITH LONG-TERM CURRENT USE OF INSULIN (HCC): Primary | ICD-10-CM

## 2023-01-04 DIAGNOSIS — R19.7 DIARRHEA, UNSPECIFIED TYPE: ICD-10-CM

## 2023-01-04 DIAGNOSIS — J44.9 CHRONIC OBSTRUCTIVE PULMONARY DISEASE, UNSPECIFIED COPD TYPE (HCC): ICD-10-CM

## 2023-01-04 DIAGNOSIS — Z79.4 TYPE 2 DIABETES MELLITUS WITH DIABETIC POLYNEUROPATHY, WITH LONG-TERM CURRENT USE OF INSULIN (HCC): Primary | ICD-10-CM

## 2023-01-04 PROBLEM — E11.65 UNCONTROLLED TYPE 2 DIABETES MELLITUS WITH HYPERGLYCEMIA (HCC): Status: ACTIVE | Noted: 2023-01-04

## 2023-01-04 PROBLEM — E11.65 UNCONTROLLED TYPE 2 DIABETES MELLITUS WITH HYPERGLYCEMIA (HCC): Status: RESOLVED | Noted: 2023-01-04 | Resolved: 2023-01-04

## 2023-01-04 RX ORDER — DULAGLUTIDE 1.5 MG/.5ML
INJECTION, SOLUTION SUBCUTANEOUS
Qty: 2 ML | Refills: 0 | Status: SHIPPED | OUTPATIENT
Start: 2023-01-04 | End: 2023-01-04 | Stop reason: CLARIF

## 2023-01-04 RX ORDER — DULAGLUTIDE 1.5 MG/.5ML
INJECTION, SOLUTION SUBCUTANEOUS
Qty: 2 ML | Refills: 0 | Status: SHIPPED | OUTPATIENT
Start: 2023-01-04 | End: 2023-01-04

## 2023-01-04 RX ORDER — DULAGLUTIDE 1.5 MG/.5ML
1.5 INJECTION, SOLUTION SUBCUTANEOUS WEEKLY
Qty: 2 ML | Refills: 0 | Status: SHIPPED | OUTPATIENT
Start: 2023-01-04

## 2023-01-04 RX ORDER — ALBUTEROL SULFATE 90 UG/1
2 AEROSOL, METERED RESPIRATORY (INHALATION) EVERY 4 HOURS PRN
Qty: 8.5 G | Refills: 5 | Status: SHIPPED | OUTPATIENT
Start: 2023-01-04

## 2023-01-04 NOTE — ASSESSMENT & PLAN NOTE
Blood sugars are continuing to run high, 300-400 typically during the day   Is well controlled at night - AM sugar has been as low as 120    - Continue Trulicity 4 9KR every week and metformin 1000mg QD  - Switch to prior Lantus dosing of 20mg qAM and 15mg qPM  - Continue close blood glucose monitoring and follow up in 2 weeks  - Can discuss sliding scale or carb control short acting insulin at future visit - patient is interested in this but is willing to try 2 weeks with change in Lantus

## 2023-01-04 NOTE — ASSESSMENT & PLAN NOTE
Watery x3 weeks 5x/day without foul smell or abnormal color  Medication for diabetes was changed 2 weeks ago which may be contributing   Ddx infectious, IBD, Cdiff, celiac  - Due to difficulties making it to the bathroom will order workup at this visit rather than close monitoring

## 2023-01-04 NOTE — PROGRESS NOTES
Follow Up  Type 2 Diabetes    Name: Jono Canales      : 1985      MRN: [id number]  Encounter Provider: Cassy Wallis MD  Encounter Date: 2023   Encounter department: [facility]    Assessment & Plan     1  Type 2 diabetes mellitus with diabetic polyneuropathy, with long-term current use of insulin ([facility])  Assessment & Plan:  Blood sugars are continuing to run high, 300-400 typically during the day  Is well controlled at night - AM sugar has been as low as 120    - Continue Trulicity 5 5SI every week and metformin 1000mg QD  - Switch to prior Lantus dosing of 20mg qAM and 15mg qPM  - Continue close blood glucose monitoring and follow up in 2 weeks  - Can discuss sliding scale or carb control short acting insulin at future visit - patient is interested in this but is willing to try 2 weeks with change in Lantus    Orders:  -     Dulaglutide (Trulicity) 1 5 GJ/3 8CN SOPN; Inject 0 5 mL (1 5 mg total) under the skin once a week    2  Chronic obstructive pulmonary disease, unspecified COPD type ([facility] [address] )  Assessment & Plan:  Refilled albuterol inhaler  Will need more nebulizer supplies in the future but does not desire at this time  Orders:  -     albuterol (PROVENTIL HFA,VENTOLIN HFA) 90 mcg/act inhaler; Inhale 2 puffs every 4 (four) hours as needed for wheezing or shortness of breath    3  Diarrhea, unspecified type  Assessment & Plan:  Watery x3 weeks 5x/day without foul smell or abnormal color  Medication for diabetes was changed 2 weeks ago which may be contributing  Ddx infectious, IBD, Cdiff, celiac  - Due to difficulties making it to the bathroom will order workup at this visit rather than close monitoring    Orders:  -     Ova and parasite examination; Future  -     Stool Enteric Bacterial Panel by PCR; Future  -     Clostridium difficile toxin by PCR; Future  -     FECAL LEUKOCYTES; Future  -     Celiac Disease Antibody Profile;  Future         Subjective      Winnie Osorio is a 45yo female with poorly controlled T2DM  She is taking metformin 9693, trulicity 1 5, and Lantus 20 qPM  Tony checks her sugars 5x a day and typically runs 300-400 range, but has been as low as 120 at night  She has been on sliding scale insulin in the past to good effect; states she will look up online what dose insulin to give herself  Trulicity also worked well in the past, and was restarted 2 weeks ago simultaneous Lantus dosing change (20qAM and 15qPM previously)  Agustin Tamayo also reports watery diarrhea 5x a day with no particularly foul odor or abnormal/red/black/green coloring for the past 3 weeks, preceding the switch in medication  She states at times it comes on os quickly she cannot make it to the bathroom  Her diet is not changed from baseline and includes things like pizza and chicken  Denies any fever, nausea, vomiting, abd pain  Has felt some fatigue  Review of Systems   Constitutional: Positive for fatigue  Negative for chills and fever  HENT: Negative for ear pain and sore throat  Eyes: Negative for pain and visual disturbance  Respiratory: Negative for cough and shortness of breath  Cardiovascular: Negative for chest pain and palpitations  Gastrointestinal: Positive for diarrhea  Negative for abdominal pain, blood in stool, nausea and vomiting  Genitourinary: Negative for dysuria and hematuria  Musculoskeletal: Negative for arthralgias and back pain  Skin: Negative for color change and rash  Neurological: Negative for seizures and syncope  All other systems reviewed and are negative  Current Outpatient Medications on File Prior to Visit   Medication Sig   • acetaminophen (TYLENOL) 325 mg tablet Take 2 tablets (650 mg total) by mouth every 6 (six) hours as needed for mild pain (Patient not taking: Reported on 12/20/2022)   • Advair -21 MCG/ACT inhaler INHALE 2 PUFFS BY MOUTH TWICE DAILY    RINSE MOUTH AFTER USE   • albuterol (2 5 mg/3 mL) 0 083 % nebulizer solution TAKE 3 ML VIA NEBULIZER EVERY 6 (SIX) HOURS AS NEEDED FOR WHEEZING OR SHORTNESS OF BREATH (Patient not taking: No sig reported)   • Alcohol Swabs (Pharmacist Choice Alcohol) PADS USE 3-4 TIMES AS DIRECTED     • ARIPiprazole (ABILIFY) 10 mg tablet HALF A TAB FOR ONE WEEK THEN ONE TAB DAILY AT BEDTIME (Patient not taking: Reported on 12/20/2022)   • aspirin 81 mg chewable tablet Chew 1 tablet (81 mg total) daily   • atorvastatin (LIPITOR) 40 mg tablet  (Patient not taking: No sig reported)   • BD Pen Needle Micro U/F 32G X 6 MM MISC INJECT UNDER THE SKIN 3 (THREE) TIMES A DAY BEFORE MEALS   • Blood Glucose Monitoring Suppl (True Metrix Air Glucose Meter) w/Device KIT use as directed (Patient not taking: No sig reported)   • Blood Pressure Monitoring (Blood Pressure Monitor Automat) KATLYN Use Daily as Directed (Patient not taking: No sig reported)   • cetaphil (CETAPHIL) lotion Apply topically as needed for dry skin (Patient not taking: No sig reported)   • chlorproMAZINE (THORAZINE) 100 mg tablet Take 100 mg by mouth daily at bedtime (Patient not taking: No sig reported)   • chlorproMAZINE (THORAZINE) 50 mg tablet Take 50 mg by mouth daily at bedtime (Patient not taking: No sig reported)   • Comfort EZ Pen Needles 32G X 4 MM MISC USE 3-4 TIMES A DAY   • cyclobenzaprine (FLEXERIL) 5 mg tablet Take 1 tablet (5 mg total) by mouth 3 (three) times a day (Patient not taking: No sig reported)   • Diclofenac Sodium (VOLTAREN) 1 % APPLY 2 G TOPICALLY 4 (FOUR) TIMES A DAY (Patient not taking: No sig reported)   • Diclofenac Sodium (VOLTAREN) 1 % APPLY 2 G TOPICALLY 4 (FOUR) TIMES A DAY (Patient not taking: No sig reported)   • Global Inject Ease Lancets 30G MISC USE 3 (THREE) TIMES A DAY (Patient not taking: Reported on 9/12/2022)   • hydrOXYzine HCL (ATARAX) 50 mg tablet Take 50 mg by mouth 3 (three) times a day (Patient not taking: No sig reported)   • hydrOXYzine pamoate (VISTARIL) 50 mg capsule TAKE ONE (1) CAPSULE BY MOUTH 3 TIMES A DAY (Patient not taking: Reported on 12/20/2022)   • Insulin Glargine Solostar (Lantus SoloStar) 100 UNIT/ML SOPN Inject 0 4 mL (40 Units total) under the skin daily   • Lancet Devices (Lancing Device) MISC USE AS DIRECTED   • Latuda 120 MG tablet TAKE 1 TABLET (120 MG) BY ORAL ROUTE ONCE DAILY WITH FOOD (AT LEAST 350 CALORIES) (Patient not taking: No sig reported)   • lidocaine (XYLOCAINE) 5 % ointment Apply 1 application topically 3 (three) times a day (Patient not taking: No sig reported)   • lisinopril (ZESTRIL) 10 mg tablet TAKE TWO (2) TABLETS BY MOUTH DAILY (Patient not taking: Reported on 12/20/2022)   • lisinopril (ZESTRIL) 20 mg tablet TAKE 1 TABLET (20 MG TOTAL) BY MOUTH DAILY   • lurasidone (LATUDA) 80 mg tablet Take 1 tablet (80 mg total) by mouth daily with breakfast (Patient not taking: No sig reported)   • metFORMIN (GLUCOPHAGE) 1000 MG tablet TAKE ONE (1) TABLET BY MOUTH TWICE DAILY WITH FOOD   • nicotine (NICODERM CQ) 21 mg/24 hr TD 24 hr patch Place 1 patch on the skin daily (Patient not taking: No sig reported)   • OneTouch Ultra test strip USE 2-3 TIMES A DAY   • prazosin (MINIPRESS) 1 mg capsule Take 1 mg by mouth daily at bedtime (Patient not taking: No sig reported)   • pregabalin (LYRICA) 50 mg capsule Take 1 capsule (50 mg total) by mouth 3 (three) times a day (Patient not taking: Reported on 12/20/2022)   • rivaroxaban (XARELTO) 2 5 mg tablet Take 1 tablet (2 5 mg total) by mouth 2 (two) times a day with meals   • rivaroxaban (Xarelto) 2 5 mg tablet Take 1 tablet (2 5 mg total) by mouth 2 (two) times a day (Patient not taking: Reported on 12/20/2022)   • senna-docusate sodium (SENOKOT S) 8 6-50 mg per tablet Take 2 tablets by mouth daily at bedtime (Patient not taking: No sig reported)   • [DISCONTINUED] albuterol (PROVENTIL HFA,VENTOLIN HFA) 90 mcg/act inhaler INHALE 2 PUFFS 4 (FOUR) TIMES A DAY (Patient not taking: No sig reported)   • [DISCONTINUED] Dulaglutide (Trulicity) 1 5 QJ/1 0SL SOPN Tony Roberto       Objective     /84   Pulse 96   Temp 98 8 °F (37 1 °C)   Ht 5' 1" (1 549 m)   Wt 128 kg (282 lb)   SpO2 99%   BMI 53 28 kg/m²     Physical Exam  Constitutional:       General: She is not in acute distress  Appearance: She is not ill-appearing  HENT:      Head: Normocephalic and atraumatic  Mouth/Throat:      Mouth: Mucous membranes are moist       Pharynx: Oropharynx is clear  Eyes:      General:         Right eye: No discharge  Left eye: No discharge  Conjunctiva/sclera: Conjunctivae normal    Cardiovascular:      Rate and Rhythm: Normal rate and regular rhythm  Heart sounds: No murmur heard  No friction rub  No gallop  Pulmonary:      Effort: Pulmonary effort is normal  No respiratory distress  Breath sounds: Normal breath sounds  Abdominal:      General: Bowel sounds are normal       Palpations: Abdomen is soft  There is no mass  Tenderness: There is no abdominal tenderness  There is no guarding  Musculoskeletal:      Cervical back: Neck supple  No tenderness  Lymphadenopathy:      Cervical: No cervical adenopathy  Skin:     General: Skin is warm and dry  Coloration: Skin is not jaundiced  Neurological:      Mental Status: She is alert and oriented to person, place, and time     Psychiatric:         Mood and Affect: Mood normal        Angel Granados MD

## 2023-01-04 NOTE — ASSESSMENT & PLAN NOTE
Refilled albuterol inhaler  Will need more nebulizer supplies in the future but does not desire at this time

## 2023-01-10 ENCOUNTER — TELEPHONE (OUTPATIENT)
Dept: FAMILY MEDICINE CLINIC | Facility: CLINIC | Age: 38
End: 2023-01-10

## 2023-01-18 ENCOUNTER — OFFICE VISIT (OUTPATIENT)
Dept: FAMILY MEDICINE CLINIC | Facility: CLINIC | Age: 38
End: 2023-01-18

## 2023-01-18 VITALS
DIASTOLIC BLOOD PRESSURE: 80 MMHG | HEIGHT: 61 IN | SYSTOLIC BLOOD PRESSURE: 122 MMHG | WEIGHT: 282 LBS | HEART RATE: 118 BPM | OXYGEN SATURATION: 99 % | BODY MASS INDEX: 53.24 KG/M2

## 2023-01-18 DIAGNOSIS — Z79.4 TYPE 2 DIABETES MELLITUS WITH DIABETIC POLYNEUROPATHY, WITH LONG-TERM CURRENT USE OF INSULIN (HCC): Primary | ICD-10-CM

## 2023-01-18 DIAGNOSIS — E11.42 TYPE 2 DIABETES MELLITUS WITH DIABETIC POLYNEUROPATHY, WITH LONG-TERM CURRENT USE OF INSULIN (HCC): Primary | ICD-10-CM

## 2023-01-18 DIAGNOSIS — R19.7 DIARRHEA, UNSPECIFIED TYPE: ICD-10-CM

## 2023-01-18 RX ORDER — DULAGLUTIDE 1.5 MG/.5ML
1.5 INJECTION, SOLUTION SUBCUTANEOUS WEEKLY
Qty: 2 ML | Refills: 2 | Status: SHIPPED | OUTPATIENT
Start: 2023-01-18

## 2023-01-18 RX ORDER — LANCETS 33 GAUGE
EACH MISCELLANEOUS
Qty: 120 EACH | Refills: 3 | Status: SHIPPED | OUTPATIENT
Start: 2023-01-18 | End: 2023-01-24 | Stop reason: SDUPTHER

## 2023-01-18 RX ORDER — INSULIN GLARGINE 100 [IU]/ML
40 INJECTION, SOLUTION SUBCUTANEOUS DAILY
Qty: 15 ML | Refills: 2 | Status: SHIPPED | OUTPATIENT
Start: 2023-01-18

## 2023-01-18 RX ORDER — INSULIN GLARGINE 100 [IU]/ML
40 INJECTION, SOLUTION SUBCUTANEOUS DAILY
Qty: 15 ML | Refills: 2 | Status: SHIPPED | OUTPATIENT
Start: 2023-01-18 | End: 2023-01-18

## 2023-01-18 NOTE — PROGRESS NOTES
Follow Up  Diabetes Type 2, poorly controlled    Assessment/Plan:    Type 2 diabetes mellitus with diabetic polyneuropathy, with long-term current use of insulin (HCC)  Currently poorly controlled with morning blood sugars in 200s and 300s, has not been taking daytime blood sugars over the past 2 weeks  Last HBA1C 12/20/22  Lab Results   Component Value Date    HGBA1C 9 8 (A) 12/20/2022     - Continue current regimen of metformin [id number] QD and Trulicity 0 6FM every week  Increased Lantus to 20mg BID (vs 20mg qAM and 15mg qPM)  - Phillip Cutler would like to start on sliding scale short acting insulin and has expressed this at repeated office visits  Will start with Humalog 3 units BID immediately after lunch and dinner  Check blood sugars before each meal  - Call in 2 weeks with blood glucose values and will adjust Humalog accordingly  - Discusses lifestyle changes that can help with glucose control including physical activity and inclusion of dietary fiber  - Outside-the-system psychiatrist started Phillip Cutler on Seroquel BID  Will discuss with patient at next contact about asking psychiatrist for potential alternatives to this glucose-elevating and somnolence-inducing drug    Diarrhea  Continues to have diarrhea, unchanged form previous  Struggling with collecting a sample  - Discussed options to help control diarrhea if infectious process is ruled out  Encouraged muscling through to collect sample  Nutrition Assessment and Intervention:     New Nutrition Prescription completed with patient      Emotional and Mental Well-being, Sleep, Connectedness Assessment and Intervention:    Sleep/stress assessment performed    Counseled regarding sleep hygiene and aspects of healthy sleep        Subjective:      Patient ID: Ramsey Lowe is a 40 y o  female  Phillip Cutler is presenting for a DMT2 follow up  She has been chacking AM blood sugars which have been in 200s and 300s  She has not taken daytime blood sugars   Phillip Cutler expresses that she would prefer if she could go back to a prior regimen that included short acting insulin  Her psychiatrist recently started her on seroquel which has induced mid-day naps every day  Sleep hygeine including diet/exercise during the day to help with sleep at night and avoiding daytime naps discussed  Bruce Briceno continues to have diarrhea  She is aware there are labs ordered to work this up and is working on collecting a stool sample  Review of Systems   Constitutional: Positive for fatigue  Negative for chills and fever  HENT: Negative for ear pain and sore throat  Eyes: Negative for pain and visual disturbance  Respiratory: Negative for cough and shortness of breath  Cardiovascular: Negative for chest pain and palpitations  Gastrointestinal: Positive for diarrhea  Negative for abdominal pain and vomiting  Endocrine: Positive for polyuria  Genitourinary: Negative for dysuria and hematuria  Musculoskeletal: Negative for arthralgias and back pain  Skin: Negative for color change and rash  Neurological: Negative for seizures and syncope  Objective:      /80   Pulse (!) 118   Ht 5' 1" (1 549 m)   Wt 128 kg (282 lb)   SpO2 99%   BMI 53 28 kg/m²          Physical Exam  Constitutional:       General: She is not in acute distress  Appearance: She is not ill-appearing  HENT:      Head: Normocephalic and atraumatic  Mouth/Throat:      Mouth: Mucous membranes are moist       Pharynx: Oropharynx is clear  Eyes:      General:         Right eye: No discharge  Left eye: No discharge  Conjunctiva/sclera: Conjunctivae normal    Cardiovascular:      Rate and Rhythm: Normal rate and regular rhythm  Heart sounds: No murmur heard  No friction rub  No gallop  Pulmonary:      Effort: Pulmonary effort is normal  No respiratory distress  Breath sounds: Normal breath sounds     Abdominal:      General: Bowel sounds are normal  Palpations: Abdomen is soft  There is no mass  Tenderness: There is no abdominal tenderness  There is no right CVA tenderness, left CVA tenderness or guarding  Musculoskeletal:         General: No swelling, tenderness or deformity  Cervical back: Neck supple  No tenderness  Lymphadenopathy:      Cervical: No cervical adenopathy  Skin:     General: Skin is warm and dry  Coloration: Skin is not jaundiced  Neurological:      Mental Status: She is alert and oriented to person, place, and time     Psychiatric:         Mood and Affect: Mood normal

## 2023-01-18 NOTE — ASSESSMENT & PLAN NOTE
Currently poorly controlled with morning blood sugars in 200s and 300s, has not been taking daytime blood sugars over the past 2 weeks  Last HBA1C 12/20/22  Lab Results   Component Value Date    HGBA1C 9 8 (A) 12/20/2022     - Continue current regimen of metformin 3165GY QD and Trulicity 4 3OS every week  Increased Lantus to 20mg BID (vs 20mg qAM and 15mg qPM)  - Leonel Calles would like to start on sliding scale short acting insulin and has expressed this at repeated office visits  Will start with Humalog 3 units BID immediately after lunch and dinner  Check blood sugars before each meal  - Call in 2 weeks with blood glucose values and will adjust Humalog accordingly  - Discusses lifestyle changes that can help with glucose control including physical activity and inclusion of dietary fiber  - Outside-the-system psychiatrist started Leonel Calles on Seroquel BID   Will discuss with patient at next contact about asking psychiatrist for potential alternatives to this glucose-elevating and somnolence-inducing drug

## 2023-01-18 NOTE — ASSESSMENT & PLAN NOTE
Continues to have diarrhea, unchanged form previous  Struggling with collecting a sample  - Discussed options to help control diarrhea if infectious process is ruled out  Encouraged muscling through to collect sample

## 2023-01-23 ENCOUNTER — TELEPHONE (OUTPATIENT)
Dept: FAMILY MEDICINE CLINIC | Facility: CLINIC | Age: 38
End: 2023-01-23

## 2023-01-24 DIAGNOSIS — E11.42 TYPE 2 DIABETES MELLITUS WITH DIABETIC POLYNEUROPATHY, WITH LONG-TERM CURRENT USE OF INSULIN (HCC): ICD-10-CM

## 2023-01-24 DIAGNOSIS — E11.9 DIABETES (HCC): ICD-10-CM

## 2023-01-24 DIAGNOSIS — I10 HYPERTENSION: ICD-10-CM

## 2023-01-24 DIAGNOSIS — Z79.4 TYPE 2 DIABETES MELLITUS WITH DIABETIC POLYNEUROPATHY, WITH LONG-TERM CURRENT USE OF INSULIN (HCC): ICD-10-CM

## 2023-01-24 RX ORDER — LISINOPRIL 20 MG/1
20 TABLET ORAL DAILY
Qty: 30 TABLET | Refills: 0 | Status: SHIPPED | OUTPATIENT
Start: 2023-01-24

## 2023-01-24 RX ORDER — LANCETS 33 GAUGE
EACH MISCELLANEOUS
Qty: 120 EACH | Refills: 5 | Status: SHIPPED | OUTPATIENT
Start: 2023-01-24

## 2023-01-31 DIAGNOSIS — I10 HYPERTENSION: ICD-10-CM

## 2023-01-31 DIAGNOSIS — J44.1 CHRONIC OBSTRUCTIVE PULMONARY DISEASE WITH ACUTE EXACERBATION (HCC): ICD-10-CM

## 2023-01-31 RX ORDER — LISINOPRIL 10 MG/1
TABLET ORAL
Qty: 60 TABLET | Refills: 2 | Status: SHIPPED | OUTPATIENT
Start: 2023-01-31

## 2023-01-31 RX ORDER — FLUTICASONE PROPIONATE AND SALMETEROL XINAFOATE 115; 21 UG/1; UG/1
AEROSOL, METERED RESPIRATORY (INHALATION)
Qty: 12 G | Refills: 0 | Status: SHIPPED | OUTPATIENT
Start: 2023-01-31

## 2023-02-02 ENCOUNTER — TELEPHONE (OUTPATIENT)
Dept: FAMILY MEDICINE CLINIC | Facility: CLINIC | Age: 38
End: 2023-02-02

## 2023-02-02 NOTE — TELEPHONE ENCOUNTER
Please let Mateus Stephen know she can increase her Humalog by 3mL per dose each day until she gets the glucose controlled  Also just to be sure she is aware, please alert her that if her psychiatrist is adjusting her seroquel it may alter her blood glucose, which may change her insulin requirements

## 2023-02-02 NOTE — TELEPHONE ENCOUNTER
Patient called stating that the 3ml humalog is not working to lower her blood sugar  Patient asked if dose can be increased  Please advise   Thank you

## 2023-02-20 DIAGNOSIS — I10 HYPERTENSION: ICD-10-CM

## 2023-02-20 RX ORDER — LISINOPRIL 20 MG/1
20 TABLET ORAL DAILY
Qty: 30 TABLET | Refills: 0 | Status: SHIPPED | OUTPATIENT
Start: 2023-02-20

## 2023-02-21 DIAGNOSIS — E11.9 DIABETES (HCC): ICD-10-CM

## 2023-03-01 DIAGNOSIS — J44.1 CHRONIC OBSTRUCTIVE PULMONARY DISEASE WITH ACUTE EXACERBATION (HCC): ICD-10-CM

## 2023-03-01 RX ORDER — FLUTICASONE PROPIONATE AND SALMETEROL XINAFOATE 115; 21 UG/1; UG/1
AEROSOL, METERED RESPIRATORY (INHALATION)
Qty: 12 G | Refills: 0 | Status: SHIPPED | OUTPATIENT
Start: 2023-03-01

## 2023-03-03 ENCOUNTER — HOSPITAL ENCOUNTER (EMERGENCY)
Facility: HOSPITAL | Age: 38
Discharge: HOME | End: 2023-03-04
Attending: EMERGENCY MEDICINE

## 2023-03-03 ENCOUNTER — APPOINTMENT (EMERGENCY)
Dept: CT IMAGING | Facility: HOSPITAL | Age: 38
End: 2023-03-03

## 2023-03-03 ENCOUNTER — APPOINTMENT (EMERGENCY)
Dept: RADIOLOGY | Facility: HOSPITAL | Age: 38
End: 2023-03-03

## 2023-03-03 VITALS
HEART RATE: 98 BPM | OXYGEN SATURATION: 98 % | DIASTOLIC BLOOD PRESSURE: 72 MMHG | RESPIRATION RATE: 19 BRPM | SYSTOLIC BLOOD PRESSURE: 127 MMHG | TEMPERATURE: 98.4 F

## 2023-03-03 DIAGNOSIS — M79.652 ACUTE PAIN OF LEFT THIGH: Primary | ICD-10-CM

## 2023-03-03 LAB
ALBUMIN SERPL BCP-MCNC: 3.7 G/DL (ref 3.5–5)
ALP SERPL-CCNC: 125 U/L (ref 34–104)
ALT SERPL W P-5'-P-CCNC: 28 U/L (ref 7–52)
ANION GAP SERPL CALCULATED.3IONS-SCNC: 11 MMOL/L (ref 4–13)
AST SERPL W P-5'-P-CCNC: 15 U/L (ref 13–39)
BASOPHILS # BLD AUTO: 0.07 THOUSANDS/ÂΜL (ref 0–0.1)
BASOPHILS NFR BLD AUTO: 1 % (ref 0–1)
BILIRUB SERPL-MCNC: 0.38 MG/DL (ref 0.2–1)
BUN SERPL-MCNC: 16 MG/DL (ref 5–25)
CALCIUM SERPL-MCNC: 9.1 MG/DL (ref 8.4–10.2)
CHLORIDE SERPL-SCNC: 103 MMOL/L (ref 96–108)
CO2 SERPL-SCNC: 22 MMOL/L (ref 21–32)
CREAT SERPL-MCNC: 0.66 MG/DL (ref 0.6–1.3)
EOSINOPHIL # BLD AUTO: 0.19 THOUSAND/ÂΜL (ref 0–0.61)
EOSINOPHIL NFR BLD AUTO: 2 % (ref 0–6)
ERYTHROCYTE [DISTWIDTH] IN BLOOD BY AUTOMATED COUNT: 16 % (ref 11.6–15.1)
GFR SERPL CREATININE-BSD FRML MDRD: 114 ML/MIN/1.73SQ M
GLUCOSE SERPL-MCNC: 314 MG/DL (ref 65–140)
HCT VFR BLD AUTO: 38.5 % (ref 34.8–46.1)
HGB BLD-MCNC: 13.6 G/DL (ref 11.5–15.4)
IMM GRANULOCYTES # BLD AUTO: 0.14 THOUSAND/UL (ref 0–0.2)
IMM GRANULOCYTES NFR BLD AUTO: 1 % (ref 0–2)
LYMPHOCYTES # BLD AUTO: 2.66 THOUSANDS/ÂΜL (ref 0.6–4.47)
LYMPHOCYTES NFR BLD AUTO: 22 % (ref 14–44)
MAGNESIUM SERPL-MCNC: 1.9 MG/DL (ref 1.9–2.7)
MCH RBC QN AUTO: 25.9 PG (ref 26.8–34.3)
MCHC RBC AUTO-ENTMCNC: 35.3 G/DL (ref 31.4–37.4)
MCV RBC AUTO: 73 FL (ref 82–98)
MONOCYTES # BLD AUTO: 0.6 THOUSAND/ÂΜL (ref 0.17–1.22)
MONOCYTES NFR BLD AUTO: 5 % (ref 4–12)
NEUTROPHILS # BLD AUTO: 8.33 THOUSANDS/ÂΜL (ref 1.85–7.62)
NEUTS SEG NFR BLD AUTO: 69 % (ref 43–75)
NRBC BLD AUTO-RTO: 0 /100 WBCS
PLATELET # BLD AUTO: 379 THOUSANDS/UL (ref 149–390)
PMV BLD AUTO: 10.1 FL (ref 8.9–12.7)
POTASSIUM SERPL-SCNC: 3.6 MMOL/L (ref 3.5–5.3)
PROT SERPL-MCNC: 7.4 G/DL (ref 6.4–8.4)
RBC # BLD AUTO: 5.26 MILLION/UL (ref 3.81–5.12)
SODIUM SERPL-SCNC: 136 MMOL/L (ref 135–147)
WBC # BLD AUTO: 11.99 THOUSAND/UL (ref 4.31–10.16)

## 2023-03-03 RX ORDER — HYDROMORPHONE HCL/PF 1 MG/ML
0.5 SYRINGE (ML) INJECTION ONCE
Status: COMPLETED | OUTPATIENT
Start: 2023-03-03 | End: 2023-03-03

## 2023-03-03 RX ORDER — KETOROLAC TROMETHAMINE 30 MG/ML
15 INJECTION, SOLUTION INTRAMUSCULAR; INTRAVENOUS ONCE
Status: COMPLETED | OUTPATIENT
Start: 2023-03-03 | End: 2023-03-03

## 2023-03-03 RX ADMIN — HYDROMORPHONE HYDROCHLORIDE 0.5 MG: 1 INJECTION, SOLUTION INTRAMUSCULAR; INTRAVENOUS; SUBCUTANEOUS at 22:03

## 2023-03-03 RX ADMIN — IOHEXOL 100 ML: 350 INJECTION, SOLUTION INTRAVENOUS at 22:57

## 2023-03-03 RX ADMIN — KETOROLAC TROMETHAMINE 15 MG: 30 INJECTION, SOLUTION INTRAMUSCULAR at 21:14

## 2023-03-04 RX ADMIN — DICLOFENAC SODIUM TOPICAL GEL, 1%, 2 G: 10 GEL TOPICAL at 00:17

## 2023-03-13 ENCOUNTER — TELEPHONE (OUTPATIENT)
Dept: VASCULAR SURGERY | Facility: CLINIC | Age: 38
End: 2023-03-13

## 2023-03-13 ENCOUNTER — HOSPITAL ENCOUNTER (OUTPATIENT)
Dept: NON INVASIVE DIAGNOSTICS | Facility: CLINIC | Age: 38
Discharge: HOME | End: 2023-03-13

## 2023-03-13 DIAGNOSIS — Z79.4 TYPE 2 DIABETES MELLITUS WITH DIABETIC POLYNEUROPATHY, WITH LONG-TERM CURRENT USE OF INSULIN (HCC): ICD-10-CM

## 2023-03-13 DIAGNOSIS — I70.202 ATHEROSCLEROSIS OF LEFT LEG (HCC): ICD-10-CM

## 2023-03-13 DIAGNOSIS — E11.42 TYPE 2 DIABETES MELLITUS WITH DIABETIC POLYNEUROPATHY, WITH LONG-TERM CURRENT USE OF INSULIN (HCC): ICD-10-CM

## 2023-03-13 NOTE — TELEPHONE ENCOUNTER
Received call from [facility] [employer], pt had LEAD today and she has a significant change, bypass is open but there is new stenosis in the tibial peroneal trunk and pt has been c/o pain x 1 month  She asked if pt could be seen this week by VS, if not she would like to see her  Advised I would let the call center know  Please call pt to schedule apt this week as per above  Thank you

## 2023-03-13 NOTE — TELEPHONE ENCOUNTER
----- Message from Shahram Valdivia sent at 3/13/2023  2:38 PM EDT -----  Scheduled for a 130p  thank you!  ----- Message -----  From: Alex Moreland  Sent: 3/13/2023   2:22 PM EDT  To: [facility]    Patients Name: Constance Lozoya  : 1985  Phone Number: [phone]  Appointment Date: 3/14/23  Appointment time: 2:30PM    Address: [address]  Drop off Facility/Office Name: [facility]   Drop off  Address: [address] [place] [address], [address]: [phone]  Special notes/directions for     PLEASE CALL PT WHEN ARRIVED     Note for scheduling team

## 2023-03-14 ENCOUNTER — LAB (OUTPATIENT)
Dept: LAB | Facility: CLINIC | Age: 38
End: 2023-03-14

## 2023-03-14 ENCOUNTER — TELEPHONE (OUTPATIENT)
Dept: OTHER | Facility: OTHER | Age: 38
End: 2023-03-14

## 2023-03-14 ENCOUNTER — OFFICE VISIT (OUTPATIENT)
Dept: VASCULAR SURGERY | Facility: CLINIC | Age: 38
End: 2023-03-14

## 2023-03-14 ENCOUNTER — PREP FOR PROCEDURE (OUTPATIENT)
Dept: VASCULAR SURGERY | Facility: CLINIC | Age: 38
End: 2023-03-14

## 2023-03-14 ENCOUNTER — TELEPHONE (OUTPATIENT)
Dept: VASCULAR SURGERY | Facility: CLINIC | Age: 38
End: 2023-03-14

## 2023-03-14 VITALS
WEIGHT: 280 LBS | HEIGHT: 61 IN | HEART RATE: 116 BPM | RESPIRATION RATE: 20 BRPM | DIASTOLIC BLOOD PRESSURE: 90 MMHG | SYSTOLIC BLOOD PRESSURE: 132 MMHG | BODY MASS INDEX: 52.87 KG/M2

## 2023-03-14 DIAGNOSIS — R19.7 DIARRHEA, UNSPECIFIED TYPE: ICD-10-CM

## 2023-03-14 DIAGNOSIS — Z79.4 TYPE 2 DIABETES MELLITUS WITH DIABETIC POLYNEUROPATHY, WITH LONG-TERM CURRENT USE OF INSULIN (HCC): ICD-10-CM

## 2023-03-14 DIAGNOSIS — E11.42 TYPE 2 DIABETES MELLITUS WITH DIABETIC POLYNEUROPATHY, WITH LONG-TERM CURRENT USE OF INSULIN (HCC): ICD-10-CM

## 2023-03-14 DIAGNOSIS — I73.9 PAD (PERIPHERAL ARTERY DISEASE) (HCC): Primary | ICD-10-CM

## 2023-03-14 DIAGNOSIS — I73.9 PAD (PERIPHERAL ARTERY DISEASE) (HCC): ICD-10-CM

## 2023-03-14 DIAGNOSIS — E11.9 DIABETES (HCC): ICD-10-CM

## 2023-03-14 DIAGNOSIS — I10 HYPERTENSION: ICD-10-CM

## 2023-03-14 LAB
ANION GAP SERPL CALCULATED.3IONS-SCNC: 14 MMOL/L (ref 4–13)
BUN SERPL-MCNC: 16 MG/DL (ref 5–25)
CALCIUM SERPL-MCNC: 10.1 MG/DL (ref 8.4–10.2)
CHLORIDE SERPL-SCNC: 91 MMOL/L (ref 96–108)
CO2 SERPL-SCNC: 23 MMOL/L (ref 21–32)
CREAT SERPL-MCNC: 0.82 MG/DL (ref 0.6–1.3)
ERYTHROCYTE [DISTWIDTH] IN BLOOD BY AUTOMATED COUNT: 16.1 % (ref 11.6–15.1)
GFR SERPL CREATININE-BSD FRML MDRD: 92 ML/MIN/1.73SQ M
GLUCOSE P FAST SERPL-MCNC: 530 MG/DL (ref 65–99)
HCT VFR BLD AUTO: 43.3 % (ref 34.8–46.1)
HGB BLD-MCNC: 15.3 G/DL (ref 11.5–15.4)
MCH RBC QN AUTO: 25.7 PG (ref 26.8–34.3)
MCHC RBC AUTO-ENTMCNC: 35.3 G/DL (ref 31.4–37.4)
MCV RBC AUTO: 73 FL (ref 82–98)
PLATELET # BLD AUTO: 397 THOUSANDS/UL (ref 149–390)
PMV BLD AUTO: 10.9 FL (ref 8.9–12.7)
POTASSIUM SERPL-SCNC: 3.8 MMOL/L (ref 3.5–5.3)
RBC # BLD AUTO: 5.95 MILLION/UL (ref 3.81–5.12)
SODIUM SERPL-SCNC: 128 MMOL/L (ref 135–147)
WBC # BLD AUTO: 10.04 THOUSAND/UL (ref 4.31–10.16)

## 2023-03-14 RX ORDER — INSULIN LISPRO 100 [IU]/ML
INJECTION, SOLUTION INTRAVENOUS; SUBCUTANEOUS
Qty: 10 ML | Refills: 0 | Status: ON HOLD | OUTPATIENT
Start: 2023-03-14

## 2023-03-14 RX ORDER — LISINOPRIL 20 MG/1
20 TABLET ORAL DAILY
Qty: 30 TABLET | Refills: 0 | Status: ON HOLD | OUTPATIENT
Start: 2023-03-14

## 2023-03-14 NOTE — PROGRESS NOTES
Assessment/Plan:    PAD (peripheral artery disease) (HCC)  Left CFA to BK pop bypass 9/2021 for CLTI  Endorse some pain in right foot which is not vascular in etiology  R NIKKI 0 9    LEAD today shows decrease in NIKKI to 0 65 fro 0 85 and progression of tibial occlusion disease  There is an elevated velocitiy at the prox anastomosis which has not changed from prior duplex    Still smoking  Compliant with PAD dose xarleto and aspirin/statin    I recommended LLE angiogram with possible intervention as a next step  Strongly advised smoking cessation   Will schedule with in 2 weeks       Diagnoses and all orders for this visit:    PAD (peripheral artery disease) ([facility] [address] )  -     IR lower extremity angiogram; Future  -     Basic metabolic panel; Future  -     CBC and Platelet; Future    BMI 45 0-49 9, adult [facility])    Other orders  -     Nursing communication Apply gown prior to procedure; Standing  -     Have Patient Void On Call to Procedure Room; Standing  -     Insert and Maintain IV; Standing          Subjective:      Patient ID: Amarjit Arzola is a 40 y o  female  Patient had a EZEKIEL on 3/13/23  Pt can walk <1/2 block before having burning pain in her legs,  resting for a few minutes  Pt has rest pain at night  Pt denies open wounds  Pt smokes 6-7 cigarettes a day  HPI    The following portions of the patient's history were reviewed and updated as appropriate: allergies, current medications, past family history, past medical history, past social history, past surgical history and problem list  Worsening left foot pain at rest and with ambulation  Also endorse some numbness in the right foot  No LE wounds, still smoking, taking xarelto and aspirin  Review of Systems   Musculoskeletal: Positive for gait problem  Leg pain when walking  Leg pain at night   Skin: Negative for color change and wound  Neurological: Negative for weakness and numbness         I have reviewed the ROS above and made changes as needed  Objective:      /90 (BP Location: Left arm, Patient Position: Sitting)   Pulse (!) 116   Resp 20   Ht 5' 1" (1 549 m)   Wt 127 kg (280 lb)   LMP  (LMP Unknown)   BMI 52 91 kg/m²          Physical Exam      General  Exam: alert, awake, oriented, no distress, consistent with stated age    [de-identified]  Exam: warm, dry, no gross lesions, no bruises and normal color    Head and Neck  Exam: supple, no bruits, trachea midline, no JVD, no mass or palpable nodes    Eye  Exam: extraoccular movements intact, no scleral icterus, sclera clear, pupils equal round and reactive to light    ENMT  Exam: oral mucosa pink and moist    Chest and Lung  Exam: chest normal without deformity, bilaterally expansive, clear to auscultation    Cardiovascular  Exam: regular rate, regular rhythm, no murmurs, no rubs or gallops    Adbomen  Exam: soft, non-tender, non-distended, no pulsatile abdominal masses, no abdominal bruit    Peripheral Vascular  Exam: no clubbing of the digits of the upper extremity, no cyanosis, no edema, both feet are warm, radial pulses 2+ bilaterally, skin well perfused, without and no varicosities      No widened popliteal pulse noted bilaterally    Upper Extremity:  Palpation: Radial pulse- Bilateral 2+    Lower Extremity:  Palpation: Femoral pulse- Bilateral 2+          Right DP palpable    Left distal pulses absent    Left foot cool, right warm    Palpable pulse in in-situ graft    Neurologic  Exam:alert, non-focal, oriented x 3, cranial nerves II-XII grossly intact        Operative Scheduling Information:    Hospital:  [facility]    Physician:  Wanda Casanova    Surgery: Left leg angiogram with possible intervention    Urgency:  Standard    Level:  Level 2: Outpatients to be scheduled for surgery with time dependent medical necessity within 2 weeks    Case Length:  90 mins    Post-op Bed:  Outpatient    OR Table:  Hybrid    Equipment Needs:  Rep: CSI rep    Medication Instructions:  Aspirin: Continue (do not hold)  Xarelto:  Hold for 2 days prior to procedure    Hydration:  No    Contrast Allergy:  no

## 2023-03-14 NOTE — ASSESSMENT & PLAN NOTE
Left CFA to BK pop bypass 9/2021 for CLTI  Endorse some pain in right foot which is not vascular in etiology  R NIKKI 0 9    LEAD today shows decrease in NIKKI to 0 65 fro 0 85 and progression of tibial occlusion disease  There is an elevated velocitiy at the prox anastomosis which has not changed from prior duplex    Still smoking  Compliant with PAD dose xarleto and aspirin/statin    I recommended LLE angiogram with possible intervention as a next step  Strongly advised smoking cessation   Will schedule with in 2 weeks

## 2023-03-14 NOTE — H&P (VIEW-ONLY)
Assessment/Plan:    PAD (peripheral artery disease) (HCC)  Left CFA to BK pop bypass 9/2021 for CLTI  Endorse some pain in right foot which is not vascular in etiology  R NIKKI 0 9    LEAD today shows decrease in NIKKI to 0 65 fro 0 85 and progression of tibial occlusion disease  There is an elevated velocitiy at the prox anastomosis which has not changed from prior duplex    Still smoking  Compliant with PAD dose xarleto and aspirin/statin    I recommended LLE angiogram with possible intervention as a next step  Strongly advised smoking cessation   Will schedule with in 2 weeks       Diagnoses and all orders for this visit:    PAD (peripheral artery disease) ([facility] [address] )  -     IR lower extremity angiogram; Future  -     Basic metabolic panel; Future  -     CBC and Platelet; Future    BMI 45 0-49 9, adult [facility])    Other orders  -     Nursing communication Apply gown prior to procedure; Standing  -     Have Patient Void On Call to Procedure Room; Standing  -     Insert and Maintain IV; Standing          Subjective:      Patient ID: Hakan Ash is a 40 y o  female  Patient had a EZEKIEL on 3/13/23  Pt can walk <1/2 block before having burning pain in her legs,  resting for a few minutes  Pt has rest pain at night  Pt denies open wounds  Pt smokes 6-7 cigarettes a day  HPI    The following portions of the patient's history were reviewed and updated as appropriate: allergies, current medications, past family history, past medical history, past social history, past surgical history and problem list  Worsening left foot pain at rest and with ambulation  Also endorse some numbness in the right foot  No LE wounds, still smoking, taking xarelto and aspirin  Review of Systems   Musculoskeletal: Positive for gait problem  Leg pain when walking  Leg pain at night   Skin: Negative for color change and wound  Neurological: Negative for weakness and numbness         I have reviewed the ROS above and made changes as needed  Objective:      /90 (BP Location: Left arm, Patient Position: Sitting)   Pulse (!) 116   Resp 20   Ht 5' 1" (1 549 m)   Wt 127 kg (280 lb)   LMP  (LMP Unknown)   BMI 52 91 kg/m²          Physical Exam      General  Exam: alert, awake, oriented, no distress, consistent with stated age    [de-identified]  Exam: warm, dry, no gross lesions, no bruises and normal color    Head and Neck  Exam: supple, no bruits, trachea midline, no JVD, no mass or palpable nodes    Eye  Exam: extraoccular movements intact, no scleral icterus, sclera clear, pupils equal round and reactive to light    ENMT  Exam: oral mucosa pink and moist    Chest and Lung  Exam: chest normal without deformity, bilaterally expansive, clear to auscultation    Cardiovascular  Exam: regular rate, regular rhythm, no murmurs, no rubs or gallops    Adbomen  Exam: soft, non-tender, non-distended, no pulsatile abdominal masses, no abdominal bruit    Peripheral Vascular  Exam: no clubbing of the digits of the upper extremity, no cyanosis, no edema, both feet are warm, radial pulses 2+ bilaterally, skin well perfused, without and no varicosities      No widened popliteal pulse noted bilaterally    Upper Extremity:  Palpation: Radial pulse- Bilateral 2+    Lower Extremity:  Palpation: Femoral pulse- Bilateral 2+          Right DP palpable    Left distal pulses absent    Left foot cool, right warm    Palpable pulse in in-situ graft    Neurologic  Exam:alert, non-focal, oriented x 3, cranial nerves II-XII grossly intact        Operative Scheduling Information:    Hospital:  [facility]    Physician:  [facility] [place] [place]    Surgery: Left leg angiogram with possible intervention    Urgency:  Standard    Level:  Level 2: Outpatients to be scheduled for surgery with time dependent medical necessity within 2 weeks    Case Length:  90 mins    Post-op Bed:  Outpatient    OR Table:  Hybrid    Equipment Needs:  Rep: CSI rep    Medication Instructions:  Aspirin: Continue (do not hold)  Xarelto:  Hold for 2 days prior to procedure    Hydration:  No    Contrast Allergy:  no

## 2023-03-14 NOTE — TELEPHONE ENCOUNTER
Lab Result: Fasting glucose 530   Date/Time Drawn: 03/14/2023  8345   Ordering Provider: Celestino Adkins   Lab Personnel's Name: Tacho Raphael       The following critical/stat result was read back to the lab as stated above and [employer] to the on-call provider  The provider confirmed receipt of the message

## 2023-03-15 ENCOUNTER — TELEPHONE (OUTPATIENT)
Dept: FAMILY MEDICINE CLINIC | Facility: CLINIC | Age: 38
End: 2023-03-15

## 2023-03-15 NOTE — TELEPHONE ENCOUNTER
Kathy from [facility] vascular center called and stated that the patient is going to be having a L leg angiogram and that the patient had labs that resulted a fasting glucose of 530  Please advise, thank you

## 2023-03-15 NOTE — TELEPHONE ENCOUNTER
Received abnormal glucose of 530  Called PCP office Dr Liang Pryor  At [phone] spoke with Shaista and reported abnormal value  She will task providor

## 2023-03-15 NOTE — TELEPHONE ENCOUNTER
I am concerned she is either not taking her medication or if she is it is doing little to help her  Either way Helga Nguyễn should make an appointment to come in

## 2023-03-16 LAB
ENDOMYSIUM IGA SER QL: NEGATIVE
GLIADIN PEPTIDE IGA SER-ACNC: 10 UNITS (ref 0–19)
GLIADIN PEPTIDE IGG SER-ACNC: 2 UNITS (ref 0–19)
IGA SERPL-MCNC: 429 MG/DL (ref 87–352)
TTG IGA SER-ACNC: <2 U/ML (ref 0–3)
TTG IGG SER-ACNC: <2 U/ML (ref 0–5)

## 2023-03-20 ENCOUNTER — ANESTHESIA EVENT (OUTPATIENT)
Dept: PERIOP | Facility: HOSPITAL | Age: 38
End: 2023-03-20

## 2023-03-21 DIAGNOSIS — Z91.89 AT RISK FOR SLEEP APNEA: Primary | ICD-10-CM

## 2023-03-21 DIAGNOSIS — E66.01 MORBID OBESITY (HCC): ICD-10-CM

## 2023-03-21 RX ORDER — QUETIAPINE FUMARATE 200 MG/1
TABLET, FILM COATED ORAL
Status: ON HOLD | COMMUNITY
Start: 2023-03-08

## 2023-03-21 RX ORDER — TOPIRAMATE 25 MG/1
TABLET ORAL
Status: ON HOLD | COMMUNITY
Start: 2023-03-08

## 2023-03-21 NOTE — PRE-PROCEDURE INSTRUCTIONS
Pre-Surgery Instructions:   Medication Instructions   • Advair -21 MCG/ACT inhaler Take day of surgery  • albuterol (PROVENTIL HFA,VENTOLIN HFA) 90 mcg/act inhaler Uses PRN- OK to take day of surgery   • dulaglutide (Trulicity) 1 5 OJ/1 1HQ injection n/a for DOS, wednesdays only   • hydrOXYzine HCL (ATARAX) 50 mg tablet Take night before surgery   • Insulin Glargine Solostar (Lantus SoloStar) 100 UNIT/ML SOPN Take day of surgery  • insulin lispro (HumaLOG) 100 units/mL injection Hold day of surgery  • lidocaine (XYLOCAINE) 5 % ointment Hold day of surgery  • lisinopril (ZESTRIL) 20 mg tablet Hold day of surgery  • metFORMIN (GLUCOPHAGE) 1000 MG tablet Hold day of surgery  • prazosin (MINIPRESS) 1 mg capsule Take day of surgery  • QUEtiapine (SEROquel) 200 mg tablet Take night before surgery   • rivaroxaban (Xarelto) 2 5 mg tablet states was inst by Dr Marylin Bhardwaj to hold 2 days before sx   • topiramate (TOPAMAX) 25 mg tablet Take night before surgery    Medication instructions for day surgery reviewed  Please use only a sip of water to take your instructed medications  Avoid all over the counter vitamins, supplements and NSAIDS for one week prior to surgery per anesthesia guidelines  Tylenol is ok to take as needed  Follow instructions from Dr Marylin Bhardwaj regarding Carlitos Maxim - states was told to hold med for 2 days, also inst to follow office instructions regarding aspirin ( which she states she does not take)  You will receive a call one business day prior to surgery with an arrival time and hospital directions  If your surgery is scheduled on a Monday, the hospital will be calling you on the Friday prior to your surgery  If you have not heard from anyone by 8pm, please call the hospital supervisor through the hospital  at [phone]   States has [employer] transport already arranged for arriving to hospital, but can arrange her own transportation home - reinforced need for someone w/ her for transport home - states her wife will be with her  Do not eat or drink anything after midnight the night before your surgery, including candy, mints, lifesavers, or chewing gum  Do not drink alcohol 24hrs before your surgery  Try not to smoke at least 24hrs before your surgery  Follow the pre surgery showering instructions as listed in the [facility] Booklet” or otherwise provided by your surgeon's office  Do not shave the surgical area 24 hours before surgery  Do not apply any lotions, creams, including makeup, cologne, deodorant, or perfumes after showering on the day of your surgery  No contact lenses, eye make-up, or artificial eyelashes  Remove nail polish, including gel polish, and any artificial, gel, or acrylic nails if possible  Remove all jewelry including rings and body piercing jewelry  Wear causal clothing that is easy to take on and off  Consider your type of surgery  Keep any valuables, jewelry, piercings at home  Please bring any specially ordered equipment (sling, braces) if indicated  Arrange for a responsible person to drive you to and from the hospital on the day of your surgery  Visitor Guidelines discussed  Call the surgeon's office with any new illnesses, exposures, or additional questions prior to surgery  Please reference your [facility] Booklet” for additional information to prepare for your upcoming surgery

## 2023-03-23 ENCOUNTER — TELEPHONE (OUTPATIENT)
Dept: VASCULAR SURGERY | Facility: CLINIC | Age: 38
End: 2023-03-23

## 2023-03-23 NOTE — ANESTHESIA PREPROCEDURE EVALUATION
Procedure:  Left leg angiogram with possible intervention (Left: Abdomen)    #PAD s/op left CFA to BK pop bypass (09/2021); NIKKI reduction with possible progression of disease - procedure above    #Morbid obesity  #Chronic smoker, 6-7 cig/day  #COPD, non O2 dependent; on advair  #PTSD/Schizophrenia  #HTN  #T2DM, gabapentin 600mg TID  Lab Results   Component Value Date    HGBA1C 9 8 (A) 12/20/2022     #CAD  -TTE (09/2021): EF 60%, normal RV function, no significant valvular abnormalities  -Stress MPI (09/2021):   SUMMARY:  -  Stress results: There was no chest pain during stress  -  ECG conclusions: The stress ECG was non-diagnostic   -  Perfusion imaging: There was a small, moderately severe, reversible myocardial perfusion defect of the basal to mid anterior wall  -  Gated SPECT: The calculated left ventricular ejection fraction was 50 %  Left ventricular ejection fraction was within normal limits by visual estimate  There was no diagnostic evidence for left ventricular regional abnormality      IMPRESSIONS: Abnormal study after pharmacologic vasodilation without reproduction of symptoms  There was a small amount of ischemia in the distribution of the left anterior descending coronary artery   Left ventricular systolic function was normal   Past Medical History:   Diagnosis Date   • Asthma    • Atherosclerosis    • Bipolar 1 disorder ([place] [address] )    • COPD (chronic obstructive pulmonary disease) ([facility])    • Depression    • Diabetes mellitus ([facility] )    • Hypertension    • Psychiatric disorder     cutting history   • PTSD (post-traumatic stress disorder)    • Schizoaffective disorder ([facility] )    • Tendonitis      Smoking Status: Every Day - 20 pack years   Smokeless Tobacco Status: Former   Quit Smokeless Tobacco: 04/29/22   Alcohol use: Not Currently   Comments: not currently   Drug use: Not Currently   Comments: 1 years clean from crack cocaine since 2022     ETT Properties Placement Date: 09/14/21  -BK Placement Time: 1030  -BK Mask Ventilation: Ventilated by mask (1)  -BK Preoxygenated: Yes  -BK Technique: Direct laryngoscopy;Stylet  -BK Type: Cuffed;Oral;Inflated  -BK Tube Size: 7 mm  -BK Laryngoscope: Mac  -BK Blade Size: 3  -BK Location: Oral  -BK Grade View: 2a  -BK Insertion: Atraumatic; No change in dentition  -BK Insertion attempts: 1  -BK Placement Verification: Auscultation;Cuff palpitation; End tidal CO2;Symmetrical chest wall movement  -BK Secured at (cm): 22 @ lips  -BK Comments: inserted by Hector Ridges  -BK Placed By: Other (Comment)  -BK Placed by (Name): Hector Ridges  -BK Removal Date: 09/14/21  -BK Removal Time: 9047  -BK     Lab Results   Component Value Date    WBC 10 04 03/14/2023    HGB 15 3 03/14/2023    HCT 43 3 03/14/2023    MCV 73 (L) 03/14/2023     (H) 03/14/2023       Chemistry        Component Value Date/Time    K 3 8 03/14/2023 1516    CL 91 (L) 03/14/2023 1516    CO2 23 03/14/2023 1516    CO2 26 09/14/2021 1740    BUN 16 03/14/2023 1516    CREATININE 0 82 03/14/2023 1516        Component Value Date/Time    CALCIUM 10 1 03/14/2023 1516    ALKPHOS 125 (H) 03/03/2023 2113    AST 15 03/03/2023 2113    ALT 28 03/03/2023 2113                Physical Exam    Airway    Mallampati score: I  TM Distance: >3 FB  Neck ROM: full     Dental   No notable dental hx     Cardiovascular  Rhythm: regular, Rate: normal,     Pulmonary  Breath sounds clear to auscultation,     Other Findings  Intercisor Distance > 3cm          Anesthesia Plan  ASA Score- 4     Anesthesia Type- general with ASA Monitors  Additional Monitors:   Airway Plan: ETT  Comment: Discussed benefits/risks of general anesthesia including possibility of mouth/throat pain, injury to lips/teeth, nausea/vomiting, and surgical pain along with more rare complications such as stroke, MI, pneumonia, aspiration, and injury to blood vessels  Patient understands and wishes to proceed  All questions answered            Plan Factors-Exercise tolerance (METS): >4 METS  Chart reviewed  EKG reviewed  Existing labs reviewed  Induction- intravenous  Postoperative Plan- Plan for postoperative opioid use  Planned trial extubation    Informed Consent- Anesthetic plan and risks discussed with patient  I personally reviewed this patient with the CRNA  Discussed and agreed on the Anesthesia Plan with the CRNA  Panda Clarke

## 2023-03-23 NOTE — TELEPHONE ENCOUNTER
----- Message from Alaina Liu sent at 3/23/2023  2:38 PM EDT -----  Scheduled for a 830a  thank you!    ----- Message -----  From: Chely Atkins  Sent: 3/23/2023   2:25 PM EDT  To: Patient [facility]    Patients Name: Lamount Cogan  : 1985  Phone Number: [phone]  Appointment Date: 3/24/2023  Appointment time: 9:30 am    Address: [address]  Drop off Facility/Office Name: [facility] vascular center   Drop off  Address: [address] [place] [address] Drive: [phone]  Special notes/directions for   Note for scheduling team

## 2023-03-24 ENCOUNTER — APPOINTMENT (OUTPATIENT)
Dept: RADIOLOGY | Facility: HOSPITAL | Age: 38
End: 2023-03-24

## 2023-03-24 ENCOUNTER — HOSPITAL ENCOUNTER (INPATIENT)
Facility: HOSPITAL | Age: 38
End: 2023-03-24
Attending: SURGERY | Admitting: INTERNAL MEDICINE

## 2023-03-24 ENCOUNTER — ANESTHESIA (OUTPATIENT)
Dept: PERIOP | Facility: HOSPITAL | Age: 38
End: 2023-03-24

## 2023-03-24 DIAGNOSIS — I73.9 PAD (PERIPHERAL ARTERY DISEASE) (HCC): ICD-10-CM

## 2023-03-24 DIAGNOSIS — T65.291A TOXIC EFFECT OF TOBACCO AND NICOTINE, ACCIDENTAL OR UNINTENTIONAL, INITIAL ENCOUNTER: ICD-10-CM

## 2023-03-24 DIAGNOSIS — J96.01 ACUTE RESPIRATORY FAILURE WITH HYPOXIA (HCC): ICD-10-CM

## 2023-03-24 DIAGNOSIS — Z89.612 S/P AKA (ABOVE KNEE AMPUTATION) UNILATERAL, LEFT (HCC): Primary | ICD-10-CM

## 2023-03-24 DIAGNOSIS — I70.202 ATHEROSCLEROSIS OF LEFT LEG (HCC): ICD-10-CM

## 2023-03-24 DIAGNOSIS — M79.605 LEFT LEG PAIN: ICD-10-CM

## 2023-03-24 DIAGNOSIS — Z79.4 TYPE 2 DIABETES MELLITUS WITH DIABETIC POLYNEUROPATHY, WITH LONG-TERM CURRENT USE OF INSULIN (HCC): ICD-10-CM

## 2023-03-24 DIAGNOSIS — E11.42 TYPE 2 DIABETES MELLITUS WITH DIABETIC POLYNEUROPATHY, WITH LONG-TERM CURRENT USE OF INSULIN (HCC): ICD-10-CM

## 2023-03-24 DIAGNOSIS — M79.605 PAIN OF LEFT LOWER EXTREMITY: ICD-10-CM

## 2023-03-24 DIAGNOSIS — Z89.612 S/P AKA (ABOVE KNEE AMPUTATION), LEFT (HCC): ICD-10-CM

## 2023-03-24 DIAGNOSIS — R06.03 ACUTE RESPIRATORY DISTRESS: ICD-10-CM

## 2023-03-24 DIAGNOSIS — J44.1 CHRONIC OBSTRUCTIVE PULMONARY DISEASE WITH ACUTE EXACERBATION (HCC): ICD-10-CM

## 2023-03-24 LAB
ALBUMIN SERPL BCP-MCNC: 2.7 G/DL (ref 3.5–5)
ALP SERPL-CCNC: 114 U/L (ref 46–116)
ALT SERPL W P-5'-P-CCNC: 36 U/L (ref 12–78)
ANION GAP SERPL CALCULATED.3IONS-SCNC: 5 MMOL/L (ref 4–13)
APTT PPP: 21 SECONDS (ref 23–37)
APTT PPP: 21 SECONDS (ref 23–37)
ARTERIAL PATENCY WRIST A: YES
AST SERPL W P-5'-P-CCNC: 23 U/L (ref 5–45)
BASE EXCESS BLDA CALC-SCNC: -4.6 MMOL/L
BASOPHILS # BLD AUTO: 0.07 THOUSANDS/ÂΜL (ref 0–0.1)
BASOPHILS NFR BLD AUTO: 0 % (ref 0–1)
BILIRUB SERPL-MCNC: 0.58 MG/DL (ref 0.2–1)
BUN SERPL-MCNC: 11 MG/DL (ref 5–25)
CA-I BLD-SCNC: 1.07 MMOL/L (ref 1.12–1.32)
CALCIUM ALBUM COR SERPL-MCNC: 8.9 MG/DL (ref 8.3–10.1)
CALCIUM SERPL-MCNC: 7.9 MG/DL (ref 8.3–10.1)
CARDIAC TROPONIN I PNL SERPL HS: 5 NG/L
CHLORIDE SERPL-SCNC: 106 MMOL/L (ref 96–108)
CO2 SERPL-SCNC: 20 MMOL/L (ref 21–32)
CREAT SERPL-MCNC: 0.64 MG/DL (ref 0.6–1.3)
EOSINOPHIL # BLD AUTO: 0.23 THOUSAND/ÂΜL (ref 0–0.61)
EOSINOPHIL NFR BLD AUTO: 1 % (ref 0–6)
ERYTHROCYTE [DISTWIDTH] IN BLOOD BY AUTOMATED COUNT: 15.8 % (ref 11.6–15.1)
FIBRINOGEN PPP-MCNC: 524 MG/DL (ref 227–495)
GFR SERPL CREATININE-BSD FRML MDRD: 115 ML/MIN/1.73SQ M
GLUCOSE SERPL-MCNC: 265 MG/DL (ref 65–140)
GLUCOSE SERPL-MCNC: 372 MG/DL (ref 65–140)
GLUCOSE SERPL-MCNC: 372 MG/DL (ref 65–140)
HCO3 BLDA-SCNC: 21.7 MMOL/L (ref 22–28)
HCT VFR BLD AUTO: 34.6 % (ref 34.8–46.1)
HGB BLD-MCNC: 12.3 G/DL (ref 11.5–15.4)
HOROWITZ INDEX BLDA+IHG-RTO: 60 MM[HG]
IMM GRANULOCYTES # BLD AUTO: 0.21 THOUSAND/UL (ref 0–0.2)
IMM GRANULOCYTES NFR BLD AUTO: 1 % (ref 0–2)
INR PPP: 0.99 (ref 0.84–1.19)
LYMPHOCYTES # BLD AUTO: 3.6 THOUSANDS/ÂΜL (ref 0.6–4.47)
LYMPHOCYTES NFR BLD AUTO: 21 % (ref 14–44)
MAGNESIUM SERPL-MCNC: 1.9 MG/DL (ref 1.6–2.6)
MCH RBC QN AUTO: 26.4 PG (ref 26.8–34.3)
MCHC RBC AUTO-ENTMCNC: 35.5 G/DL (ref 31.4–37.4)
MCV RBC AUTO: 74 FL (ref 82–98)
MONOCYTES # BLD AUTO: 1.14 THOUSAND/ÂΜL (ref 0.17–1.22)
MONOCYTES NFR BLD AUTO: 7 % (ref 4–12)
NEUTROPHILS # BLD AUTO: 12.34 THOUSANDS/ÂΜL (ref 1.85–7.62)
NEUTS SEG NFR BLD AUTO: 70 % (ref 43–75)
NRBC BLD AUTO-RTO: 0 /100 WBCS
NT-PROBNP SERPL-MCNC: 12 PG/ML
O2 CT BLDA-SCNC: 17.8 ML/DL (ref 16–23)
OXYHGB MFR BLDA: 95.2 % (ref 94–97)
PCO2 BLDA: 44.7 MM HG (ref 36–44)
PEEP RESPIRATORY: 8 CM[H2O]
PH BLDA: 7.3 [PH] (ref 7.35–7.45)
PHOSPHATE SERPL-MCNC: 3.2 MG/DL (ref 2.7–4.5)
PLATELET # BLD AUTO: 278 THOUSANDS/UL (ref 149–390)
PLATELET # BLD AUTO: 329 THOUSANDS/UL (ref 149–390)
PMV BLD AUTO: 10.5 FL (ref 8.9–12.7)
PMV BLD AUTO: 10.7 FL (ref 8.9–12.7)
PO2 BLDA: 113.7 MM HG (ref 75–129)
POTASSIUM SERPL-SCNC: 4 MMOL/L (ref 3.5–5.3)
PRESSURE CONTROL: 20
PROT SERPL-MCNC: 6.4 G/DL (ref 6.4–8.4)
PROTHROMBIN TIME: 13.2 SECONDS (ref 11.6–14.5)
PROTHROMBIN TIME: 13.3 SECONDS (ref 11.6–14.5)
RBC # BLD AUTO: 4.66 MILLION/UL (ref 3.81–5.12)
SODIUM SERPL-SCNC: 131 MMOL/L (ref 135–147)
SPECIMEN SOURCE: ABNORMAL
THROMBIN TIME: 16.8 SECONDS (ref 14.7–18.4)
THROMBIN TIME: 16.9 SECONDS (ref 14.7–18.4)
VENT AC: 14
VENT- AC: AC
WBC # BLD AUTO: 17.59 THOUSAND/UL (ref 4.31–10.16)

## 2023-03-24 DEVICE — STARCLOSE SE VASCULAR CLOSURE SYSTEM
Type: IMPLANTABLE DEVICE | Site: GROIN | Status: FUNCTIONAL
Brand: STARCLOSE SE

## 2023-03-24 RX ORDER — GLYCOPYRROLATE 0.2 MG/ML
INJECTION INTRAMUSCULAR; INTRAVENOUS AS NEEDED
Status: DISCONTINUED | OUTPATIENT
Start: 2023-03-24 | End: 2023-03-24

## 2023-03-24 RX ORDER — ASPIRIN 81 MG/1
81 TABLET ORAL DAILY
Status: DISCONTINUED | OUTPATIENT
Start: 2023-03-25 | End: 2023-03-25

## 2023-03-24 RX ORDER — PROPOFOL 10 MG/ML
INJECTION, EMULSION INTRAVENOUS AS NEEDED
Status: DISCONTINUED | OUTPATIENT
Start: 2023-03-24 | End: 2023-03-24

## 2023-03-24 RX ORDER — PRAZOSIN HYDROCHLORIDE 1 MG/1
1 CAPSULE ORAL
Status: DISCONTINUED | OUTPATIENT
Start: 2023-03-24 | End: 2023-03-24

## 2023-03-24 RX ORDER — OXYCODONE HYDROCHLORIDE 5 MG/1
5 TABLET ORAL EVERY 4 HOURS PRN
Status: ACTIVE | OUTPATIENT
Start: 2023-03-24

## 2023-03-24 RX ORDER — LABETALOL HYDROCHLORIDE 5 MG/ML
10 INJECTION, SOLUTION INTRAVENOUS ONCE
Status: COMPLETED | OUTPATIENT
Start: 2023-03-24 | End: 2023-03-24

## 2023-03-24 RX ORDER — HYDRALAZINE HYDROCHLORIDE 20 MG/ML
20 INJECTION INTRAMUSCULAR; INTRAVENOUS ONCE
Status: COMPLETED | OUTPATIENT
Start: 2023-03-24 | End: 2023-03-24

## 2023-03-24 RX ORDER — KETAMINE HCL IN NACL, ISO-OSM 100MG/10ML
SYRINGE (ML) INJECTION AS NEEDED
Status: DISCONTINUED | OUTPATIENT
Start: 2023-03-24 | End: 2023-03-24

## 2023-03-24 RX ORDER — ALBUTEROL SULFATE 90 UG/1
2 AEROSOL, METERED RESPIRATORY (INHALATION) EVERY 4 HOURS PRN
Status: DISPENSED | OUTPATIENT
Start: 2023-03-24

## 2023-03-24 RX ORDER — PROTAMINE SULFATE 10 MG/ML
INJECTION, SOLUTION INTRAVENOUS AS NEEDED
Status: DISCONTINUED | OUTPATIENT
Start: 2023-03-24 | End: 2023-03-24

## 2023-03-24 RX ORDER — ALBUTEROL SULFATE 90 UG/1
AEROSOL, METERED RESPIRATORY (INHALATION) AS NEEDED
Status: DISCONTINUED | OUTPATIENT
Start: 2023-03-24 | End: 2023-03-24

## 2023-03-24 RX ORDER — LEVALBUTEROL 1.25 MG/.5ML
1.25 SOLUTION, CONCENTRATE RESPIRATORY (INHALATION)
Status: DISCONTINUED | OUTPATIENT
Start: 2023-03-24 | End: 2023-03-25

## 2023-03-24 RX ORDER — FENTANYL CITRATE 50 UG/ML
INJECTION, SOLUTION INTRAMUSCULAR; INTRAVENOUS
Status: COMPLETED
Start: 2023-03-24 | End: 2023-03-24

## 2023-03-24 RX ORDER — ACETAMINOPHEN 325 MG/1
650 TABLET ORAL EVERY 4 HOURS PRN
Status: DISCONTINUED | OUTPATIENT
Start: 2023-03-24 | End: 2023-03-30

## 2023-03-24 RX ORDER — IPRATROPIUM BROMIDE AND ALBUTEROL SULFATE 2.5; .5 MG/3ML; MG/3ML
3 SOLUTION RESPIRATORY (INHALATION)
Status: DISCONTINUED | OUTPATIENT
Start: 2023-03-24 | End: 2023-03-24

## 2023-03-24 RX ORDER — OXYCODONE HYDROCHLORIDE 10 MG/1
10 TABLET ORAL EVERY 4 HOURS PRN
Status: DISPENSED | OUTPATIENT
Start: 2023-03-24

## 2023-03-24 RX ORDER — SODIUM CHLORIDE, SODIUM LACTATE, POTASSIUM CHLORIDE, CALCIUM CHLORIDE 600; 310; 30; 20 MG/100ML; MG/100ML; MG/100ML; MG/100ML
100 INJECTION, SOLUTION INTRAVENOUS CONTINUOUS
Status: DISCONTINUED | OUTPATIENT
Start: 2023-03-24 | End: 2023-03-24

## 2023-03-24 RX ORDER — SUCCINYLCHOLINE/SOD CL,ISO/PF 100 MG/5ML
SYRINGE (ML) INTRAVENOUS AS NEEDED
Status: DISCONTINUED | OUTPATIENT
Start: 2023-03-24 | End: 2023-03-24

## 2023-03-24 RX ORDER — ONDANSETRON 2 MG/ML
INJECTION INTRAMUSCULAR; INTRAVENOUS AS NEEDED
Status: DISCONTINUED | OUTPATIENT
Start: 2023-03-24 | End: 2023-03-24

## 2023-03-24 RX ORDER — MIDAZOLAM HYDROCHLORIDE 2 MG/2ML
INJECTION, SOLUTION INTRAMUSCULAR; INTRAVENOUS AS NEEDED
Status: DISCONTINUED | OUTPATIENT
Start: 2023-03-24 | End: 2023-03-24

## 2023-03-24 RX ORDER — METHYLPREDNISOLONE SODIUM SUCCINATE 40 MG/ML
40 INJECTION, POWDER, LYOPHILIZED, FOR SOLUTION INTRAMUSCULAR; INTRAVENOUS EVERY 6 HOURS SCHEDULED
Status: DISCONTINUED | OUTPATIENT
Start: 2023-03-24 | End: 2023-03-26

## 2023-03-24 RX ORDER — MIDAZOLAM HYDROCHLORIDE 2 MG/2ML
2 INJECTION, SOLUTION INTRAMUSCULAR; INTRAVENOUS EVERY 4 HOURS PRN
Status: DISCONTINUED | OUTPATIENT
Start: 2023-03-24 | End: 2023-03-26

## 2023-03-24 RX ORDER — FENTANYL CITRATE 50 UG/ML
50 INJECTION, SOLUTION INTRAMUSCULAR; INTRAVENOUS EVERY 2 HOUR PRN
Status: DISCONTINUED | OUTPATIENT
Start: 2023-03-24 | End: 2023-03-24

## 2023-03-24 RX ORDER — INSULIN GLARGINE 100 [IU]/ML
30 INJECTION, SOLUTION SUBCUTANEOUS
Status: DISCONTINUED | OUTPATIENT
Start: 2023-03-24 | End: 2023-03-24

## 2023-03-24 RX ORDER — PROPOFOL 10 MG/ML
5-80 INJECTION, EMULSION INTRAVENOUS
Status: DISCONTINUED | OUTPATIENT
Start: 2023-03-24 | End: 2023-03-24

## 2023-03-24 RX ORDER — HEPARIN SODIUM 1000 [USP'U]/ML
INJECTION, SOLUTION INTRAVENOUS; SUBCUTANEOUS AS NEEDED
Status: DISCONTINUED | OUTPATIENT
Start: 2023-03-24 | End: 2023-03-24

## 2023-03-24 RX ORDER — CLOPIDOGREL BISULFATE 75 MG/1
75 TABLET ORAL DAILY
Status: DISCONTINUED | OUTPATIENT
Start: 2023-03-25 | End: 2023-03-27

## 2023-03-24 RX ORDER — FENTANYL CITRATE 50 UG/ML
INJECTION, SOLUTION INTRAMUSCULAR; INTRAVENOUS AS NEEDED
Status: DISCONTINUED | OUTPATIENT
Start: 2023-03-24 | End: 2023-03-24

## 2023-03-24 RX ORDER — SODIUM CHLORIDE, SODIUM LACTATE, POTASSIUM CHLORIDE, CALCIUM CHLORIDE 600; 310; 30; 20 MG/100ML; MG/100ML; MG/100ML; MG/100ML
INJECTION, SOLUTION INTRAVENOUS CONTINUOUS PRN
Status: DISCONTINUED | OUTPATIENT
Start: 2023-03-24 | End: 2023-03-24

## 2023-03-24 RX ORDER — HYDRALAZINE HYDROCHLORIDE 20 MG/ML
5 INJECTION INTRAMUSCULAR; INTRAVENOUS EVERY 6 HOURS PRN
Status: DISCONTINUED | OUTPATIENT
Start: 2023-03-24 | End: 2023-03-24

## 2023-03-24 RX ORDER — HEPARIN SODIUM 200 [USP'U]/100ML
INJECTION, SOLUTION INTRAVENOUS
Status: DISCONTINUED | OUTPATIENT
Start: 2023-03-24 | End: 2023-03-24

## 2023-03-24 RX ORDER — HYDRALAZINE HYDROCHLORIDE 20 MG/ML
10 INJECTION INTRAMUSCULAR; INTRAVENOUS EVERY 6 HOURS PRN
Status: DISPENSED | OUTPATIENT
Start: 2023-03-24

## 2023-03-24 RX ORDER — CHLORHEXIDINE GLUCONATE 0.12 MG/ML
15 RINSE ORAL EVERY 12 HOURS SCHEDULED
Status: DISPENSED | OUTPATIENT
Start: 2023-03-24

## 2023-03-24 RX ORDER — HEPARIN SODIUM 5000 [USP'U]/ML
7500 INJECTION, SOLUTION INTRAVENOUS; SUBCUTANEOUS EVERY 8 HOURS SCHEDULED
Status: DISCONTINUED | OUTPATIENT
Start: 2023-03-24 | End: 2023-03-27 | Stop reason: SDUPTHER

## 2023-03-24 RX ORDER — PROPOFOL 10 MG/ML
INJECTION, EMULSION INTRAVENOUS CONTINUOUS PRN
Status: DISCONTINUED | OUTPATIENT
Start: 2023-03-24 | End: 2023-03-24

## 2023-03-24 RX ORDER — LEVALBUTEROL 1.25 MG/.5ML
1.25 SOLUTION, CONCENTRATE RESPIRATORY (INHALATION)
Status: DISCONTINUED | OUTPATIENT
Start: 2023-03-24 | End: 2023-03-24 | Stop reason: SDUPTHER

## 2023-03-24 RX ORDER — LABETALOL HYDROCHLORIDE 5 MG/ML
10 INJECTION, SOLUTION INTRAVENOUS EVERY 4 HOURS PRN
Status: DISCONTINUED | OUTPATIENT
Start: 2023-03-24 | End: 2023-03-24

## 2023-03-24 RX ORDER — INSULIN LISPRO 100 [IU]/ML
2-12 INJECTION, SOLUTION INTRAVENOUS; SUBCUTANEOUS EVERY 6 HOURS SCHEDULED
Status: DISCONTINUED | OUTPATIENT
Start: 2023-03-24 | End: 2023-03-24

## 2023-03-24 RX ORDER — LABETALOL HYDROCHLORIDE 5 MG/ML
INJECTION, SOLUTION INTRAVENOUS AS NEEDED
Status: DISCONTINUED | OUTPATIENT
Start: 2023-03-24 | End: 2023-03-24

## 2023-03-24 RX ORDER — LEVALBUTEROL 1.25 MG/.5ML
1.25 SOLUTION, CONCENTRATE RESPIRATORY (INHALATION) EVERY 4 HOURS PRN
Status: DISCONTINUED | OUTPATIENT
Start: 2023-03-24 | End: 2023-03-27

## 2023-03-24 RX ORDER — FENTANYL CITRATE-0.9 % NACL/PF 10 MCG/ML
150 PLASTIC BAG, INJECTION (ML) INTRAVENOUS CONTINUOUS
Status: DISCONTINUED | OUTPATIENT
Start: 2023-03-24 | End: 2023-03-26

## 2023-03-24 RX ORDER — PANTOPRAZOLE SODIUM 40 MG/10ML
40 INJECTION, POWDER, LYOPHILIZED, FOR SOLUTION INTRAVENOUS
Status: DISCONTINUED | OUTPATIENT
Start: 2023-03-24 | End: 2023-03-25

## 2023-03-24 RX ORDER — LABETALOL HYDROCHLORIDE 5 MG/ML
20 INJECTION, SOLUTION INTRAVENOUS EVERY 4 HOURS PRN
Status: DISCONTINUED | OUTPATIENT
Start: 2023-03-24 | End: 2023-03-27

## 2023-03-24 RX ORDER — QUETIAPINE FUMARATE 100 MG/1
200 TABLET, FILM COATED ORAL
Status: DISPENSED | OUTPATIENT
Start: 2023-03-24

## 2023-03-24 RX ORDER — FENTANYL CITRATE 50 UG/ML
100 INJECTION, SOLUTION INTRAMUSCULAR; INTRAVENOUS EVERY 2 HOUR PRN
Status: DISCONTINUED | OUTPATIENT
Start: 2023-03-24 | End: 2023-03-26

## 2023-03-24 RX ORDER — PROPOFOL 10 MG/ML
INJECTION, EMULSION INTRAVENOUS
Status: DISPENSED
Start: 2023-03-24 | End: 2023-03-25

## 2023-03-24 RX ORDER — ACETAMINOPHEN 325 MG/1
650 TABLET ORAL EVERY 4 HOURS PRN
Status: DISCONTINUED | OUTPATIENT
Start: 2023-03-24 | End: 2023-03-24

## 2023-03-24 RX ORDER — ESMOLOL HYDROCHLORIDE 10 MG/ML
INJECTION INTRAVENOUS AS NEEDED
Status: DISCONTINUED | OUTPATIENT
Start: 2023-03-24 | End: 2023-03-24

## 2023-03-24 RX ORDER — ROCURONIUM BROMIDE 10 MG/ML
INJECTION, SOLUTION INTRAVENOUS AS NEEDED
Status: DISCONTINUED | OUTPATIENT
Start: 2023-03-24 | End: 2023-03-24

## 2023-03-24 RX ORDER — PROPOFOL 10 MG/ML
100 INJECTION, EMULSION INTRAVENOUS ONCE
Status: COMPLETED | OUTPATIENT
Start: 2023-03-24 | End: 2023-03-24

## 2023-03-24 RX ORDER — SODIUM CHLORIDE, SODIUM GLUCONATE, SODIUM ACETATE, POTASSIUM CHLORIDE, MAGNESIUM CHLORIDE, SODIUM PHOSPHATE, DIBASIC, AND POTASSIUM PHOSPHATE .53; .5; .37; .037; .03; .012; .00082 G/100ML; G/100ML; G/100ML; G/100ML; G/100ML; G/100ML; G/100ML
75 INJECTION, SOLUTION INTRAVENOUS CONTINUOUS
Status: DISCONTINUED | OUTPATIENT
Start: 2023-03-24 | End: 2023-03-24

## 2023-03-24 RX ORDER — PROPOFOL 10 MG/ML
5-80 INJECTION, EMULSION INTRAVENOUS
Status: DISCONTINUED | OUTPATIENT
Start: 2023-03-24 | End: 2023-03-26

## 2023-03-24 RX ORDER — ATORVASTATIN CALCIUM 40 MG/1
40 TABLET, FILM COATED ORAL
Status: DISPENSED | OUTPATIENT
Start: 2023-03-24

## 2023-03-24 RX ORDER — NICOTINE 21 MG/24HR
21 PATCH, TRANSDERMAL 24 HOURS TRANSDERMAL DAILY
Status: DISPENSED | OUTPATIENT
Start: 2023-03-24

## 2023-03-24 RX ADMIN — Medication 140 MG: at 10:50

## 2023-03-24 RX ADMIN — PROPOFOL 80 MCG/KG/MIN: 10 INJECTION, EMULSION INTRAVENOUS at 18:10

## 2023-03-24 RX ADMIN — LABETALOL HYDROCHLORIDE 10 MG: 5 INJECTION, SOLUTION INTRAVENOUS at 12:06

## 2023-03-24 RX ADMIN — FENTANYL CITRATE 25 MCG: 50 INJECTION INTRAMUSCULAR; INTRAVENOUS at 10:42

## 2023-03-24 RX ADMIN — LEVALBUTEROL 1.25 MG: 1.25 SOLUTION, CONCENTRATE RESPIRATORY (INHALATION) at 19:35

## 2023-03-24 RX ADMIN — ESMOLOL HYDROCHLORIDE 40 MG: 100 INJECTION, SOLUTION INTRAVENOUS at 10:52

## 2023-03-24 RX ADMIN — PANTOPRAZOLE SODIUM 40 MG: 40 INJECTION, POWDER, FOR SOLUTION INTRAVENOUS at 15:48

## 2023-03-24 RX ADMIN — LIDOCAINE HYDROCHLORIDE 100 MG: 20 INJECTION INTRAVENOUS at 10:50

## 2023-03-24 RX ADMIN — NICOTINE 21 MG: 21 PATCH, EXTENDED RELEASE TRANSDERMAL at 15:48

## 2023-03-24 RX ADMIN — PROTAMINE SULFATE 70 MG: 10 INJECTION, SOLUTION INTRAVENOUS at 11:58

## 2023-03-24 RX ADMIN — ONDANSETRON 4 MG: 2 INJECTION INTRAMUSCULAR; INTRAVENOUS at 10:50

## 2023-03-24 RX ADMIN — PROPOFOL 80 MCG/KG/MIN: 10 INJECTION, EMULSION INTRAVENOUS at 19:58

## 2023-03-24 RX ADMIN — SUGAMMADEX 300 MG: 100 INJECTION, SOLUTION INTRAVENOUS at 12:00

## 2023-03-24 RX ADMIN — EPINEPHRINE 2 MCG/MIN: 1 INJECTION, SOLUTION, CONCENTRATE INTRAVENOUS at 12:28

## 2023-03-24 RX ADMIN — ACETAMINOPHEN 650 MG: 325 TABLET ORAL at 22:03

## 2023-03-24 RX ADMIN — PROPOFOL 50 MCG/KG/MIN: 10 INJECTION, EMULSION INTRAVENOUS at 14:37

## 2023-03-24 RX ADMIN — PROPOFOL 50 MCG/KG/MIN: 10 INJECTION, EMULSION INTRAVENOUS at 12:28

## 2023-03-24 RX ADMIN — FENTANYL CITRATE 25 MCG: 50 INJECTION INTRAMUSCULAR; INTRAVENOUS at 11:56

## 2023-03-24 RX ADMIN — LABETALOL HYDROCHLORIDE 10 MG: 5 INJECTION, SOLUTION INTRAVENOUS at 15:48

## 2023-03-24 RX ADMIN — PROPOFOL 50 MCG/KG/MIN: 10 INJECTION, EMULSION INTRAVENOUS at 13:39

## 2023-03-24 RX ADMIN — PROPOFOL 80 MCG/KG/MIN: 10 INJECTION, EMULSION INTRAVENOUS at 23:10

## 2023-03-24 RX ADMIN — FENTANYL CITRATE 25 MCG: 50 INJECTION INTRAMUSCULAR; INTRAVENOUS at 10:44

## 2023-03-24 RX ADMIN — FENTANYL CITRATE 50 MCG: 50 INJECTION, SOLUTION INTRAMUSCULAR; INTRAVENOUS at 16:34

## 2023-03-24 RX ADMIN — MIDAZOLAM 2 MG: 1 INJECTION INTRAMUSCULAR; INTRAVENOUS at 17:16

## 2023-03-24 RX ADMIN — LEVALBUTEROL 1.25 MG: 1.25 SOLUTION, CONCENTRATE RESPIRATORY (INHALATION) at 16:43

## 2023-03-24 RX ADMIN — MIDAZOLAM 2 MG: 1 INJECTION INTRAMUSCULAR; INTRAVENOUS at 14:38

## 2023-03-24 RX ADMIN — GLYCOPYRROLATE 0.2 MG: 0.2 INJECTION, SOLUTION INTRAMUSCULAR; INTRAVENOUS at 12:17

## 2023-03-24 RX ADMIN — LABETALOL HYDROCHLORIDE 10 MG: 5 INJECTION, SOLUTION INTRAVENOUS at 15:13

## 2023-03-24 RX ADMIN — ALBUTEROL SULFATE 2 PUFF: 90 AEROSOL, METERED RESPIRATORY (INHALATION) at 11:03

## 2023-03-24 RX ADMIN — CHLORHEXIDINE GLUCONATE 0.12% ORAL RINSE 15 ML: 1.2 LIQUID ORAL at 22:00

## 2023-03-24 RX ADMIN — Medication 100 MCG/HR: at 13:34

## 2023-03-24 RX ADMIN — FENTANYL CITRATE 100 MCG: 50 INJECTION INTRAMUSCULAR; INTRAVENOUS at 23:41

## 2023-03-24 RX ADMIN — EPINEPHRINE 3 MCG/MIN: 1 INJECTION INTRAMUSCULAR; INTRAVENOUS; SUBCUTANEOUS at 13:47

## 2023-03-24 RX ADMIN — HEPARIN SODIUM 7500 UNITS: 5000 INJECTION INTRAVENOUS; SUBCUTANEOUS at 22:04

## 2023-03-24 RX ADMIN — KETAMINE HYDROCHLORIDE 0.5 MG/KG/HR: 100 INJECTION INTRAMUSCULAR; INTRAVENOUS at 18:39

## 2023-03-24 RX ADMIN — HYDRALAZINE HYDROCHLORIDE 20 MG: 20 INJECTION, SOLUTION INTRAMUSCULAR; INTRAVENOUS at 16:23

## 2023-03-24 RX ADMIN — ALBUTEROL SULFATE 8 PUFF: 90 AEROSOL, METERED RESPIRATORY (INHALATION) at 10:51

## 2023-03-24 RX ADMIN — HYDRALAZINE HYDROCHLORIDE 5 MG: 20 INJECTION, SOLUTION INTRAMUSCULAR; INTRAVENOUS at 15:00

## 2023-03-24 RX ADMIN — INSULIN LISPRO 10 UNITS: 100 INJECTION, SOLUTION INTRAVENOUS; SUBCUTANEOUS at 17:36

## 2023-03-24 RX ADMIN — PHENYLEPHRINE HYDROCHLORIDE 30 MCG/MIN: 10 INJECTION INTRAVENOUS at 11:17

## 2023-03-24 RX ADMIN — IPRATROPIUM BROMIDE 0.5 MG: 0.5 SOLUTION RESPIRATORY (INHALATION) at 19:35

## 2023-03-24 RX ADMIN — PROPOFOL 50 MG: 10 INJECTION, EMULSION INTRAVENOUS at 11:56

## 2023-03-24 RX ADMIN — ATORVASTATIN CALCIUM 40 MG: 40 TABLET, FILM COATED ORAL at 15:48

## 2023-03-24 RX ADMIN — FENTANYL CITRATE 50 MCG: 50 INJECTION, SOLUTION INTRAMUSCULAR; INTRAVENOUS at 14:18

## 2023-03-24 RX ADMIN — IPRATROPIUM BROMIDE 0.5 MG: 0.5 SOLUTION RESPIRATORY (INHALATION) at 16:44

## 2023-03-24 RX ADMIN — Medication 150 MCG/HR: at 18:09

## 2023-03-24 RX ADMIN — PROPOFOL 80 MCG/KG/MIN: 10 INJECTION, EMULSION INTRAVENOUS at 21:32

## 2023-03-24 RX ADMIN — SODIUM CHLORIDE 7 UNITS/HR: 9 INJECTION, SOLUTION INTRAVENOUS at 19:32

## 2023-03-24 RX ADMIN — HEPARIN SODIUM 9000 UNITS: 1000 INJECTION INTRAVENOUS; SUBCUTANEOUS at 11:41

## 2023-03-24 RX ADMIN — FENTANYL CITRATE 25 MCG: 50 INJECTION INTRAMUSCULAR; INTRAVENOUS at 12:04

## 2023-03-24 RX ADMIN — SODIUM CHLORIDE, SODIUM LACTATE, POTASSIUM CHLORIDE, AND CALCIUM CHLORIDE: .6; .31; .03; .02 INJECTION, SOLUTION INTRAVENOUS at 10:44

## 2023-03-24 RX ADMIN — QUETIAPINE FUMARATE 200 MG: 100 TABLET ORAL at 22:04

## 2023-03-24 RX ADMIN — Medication 50 MG: at 11:12

## 2023-03-24 RX ADMIN — PROPOFOL 250 MG: 10 INJECTION, EMULSION INTRAVENOUS at 10:50

## 2023-03-24 RX ADMIN — PROPOFOL 150 MG: 10 INJECTION, EMULSION INTRAVENOUS at 12:17

## 2023-03-24 RX ADMIN — CHLORHEXIDINE GLUCONATE 0.12% ORAL RINSE 15 ML: 1.2 LIQUID ORAL at 13:39

## 2023-03-24 RX ADMIN — SODIUM CHLORIDE, SODIUM LACTATE, POTASSIUM CHLORIDE, AND CALCIUM CHLORIDE 100 ML/HR: .6; .31; .03; .02 INJECTION, SOLUTION INTRAVENOUS at 10:32

## 2023-03-24 RX ADMIN — METHYLPREDNISOLONE SODIUM SUCCINATE 40 MG: 40 INJECTION, POWDER, FOR SOLUTION INTRAMUSCULAR; INTRAVENOUS at 17:48

## 2023-03-24 RX ADMIN — FENTANYL CITRATE 100 MCG: 50 INJECTION INTRAMUSCULAR; INTRAVENOUS at 10:51

## 2023-03-24 RX ADMIN — PROPOFOL 80 MCG/KG/MIN: 10 INJECTION, EMULSION INTRAVENOUS at 16:29

## 2023-03-24 RX ADMIN — PROPOFOL 100 MG: 10 INJECTION, EMULSION INTRAVENOUS at 13:18

## 2023-03-24 RX ADMIN — ROCURONIUM BROMIDE 50 MG: 50 INJECTION, SOLUTION INTRAVENOUS at 11:01

## 2023-03-24 RX ADMIN — INSULIN GLARGINE 30 UNITS: 100 INJECTION, SOLUTION SUBCUTANEOUS at 17:48

## 2023-03-24 RX ADMIN — HEPARIN SODIUM 7500 UNITS: 5000 INJECTION INTRAVENOUS; SUBCUTANEOUS at 14:03

## 2023-03-24 RX ADMIN — MIDAZOLAM 2 MG: 1 INJECTION INTRAMUSCULAR; INTRAVENOUS at 10:42

## 2023-03-24 RX ADMIN — LABETALOL HYDROCHLORIDE 10 MG: 5 INJECTION, SOLUTION INTRAVENOUS at 10:59

## 2023-03-24 NOTE — ANESTHESIA POSTPROCEDURE EVALUATION
Post-Op Assessment Note    CV Status:  Stable  Pain Score: 0    Pain management: adequate     Mental Status:  Unresponsive (intubated with propofol infusing)   Hydration Status:  Euvolemic   PONV Controlled:  Controlled   Airway Patency:  Patent  Airway: intubated   Two or more mitigation strategies used for obstructive sleep apnea  There is a medical reason for not screening for obstructive sleep apnea and/or for not using two or more mitigation strategies   Post Op Vitals Reviewed: Yes      Staff: Anesthesiologist, CRNA   Comments: pt transported to MICU on portable ventilator, VSS, epi infusing at 4mcg/min + propofol infusing at 160mcg/kg/min, report given to MICU team and respiratory at bedside  Reason for prolonged intubation > 24 hours:  N/aReason for prolonged intubation > 48 hours:  N/a      No notable events documented      BP  158/72   Temp     Pulse   92   Resp   18   SpO2   95% on ventilator

## 2023-03-24 NOTE — OP NOTE
OPERATIVE REPORT  PATIENT NAME: Oj Sherwood    :  1985  MRN: [id number]  Pt Location: [facility] OR ROOM 02    SURGERY DATE: 3/24/2023    Surgeon(s) and Role:     * Sharri Armenta,  - Primary     * Noe Pedro MD - Fellow    Preop Diagnosis:  PAD (peripheral artery disease) ([facility] [address] ) [I73 9]    Post-Op Diagnosis Codes:     * PAD (peripheral artery disease) ([facility] [address] ) [I73 9]    Procedure(s):    Ultrasound guided right common femoral artery access  Abdominal aortogram  Left lower extremity arteriogram  Selective 3rd order catheterization  Starclose closure of right groin access  Radiologic supervision and interpretation of imaging    Specimen(s):  * No specimens in log *    Estimated Blood Loss:   Minimal    Drains:  * No LDAs found *    Anesthesia Type:   Choice    Operative Indications:  [facility]peripheral artery disease) [facility] ) [I73 9]    Oj Sherwood is a 40year old female with BMI 53, DM, COPD, Bipolar disorder, PAD with h/o L SFA PTA/stent 2021, subsequent stent thrombectomy 2021 and ultimate  L CFA to BK-pop bypass with in situ GSV 21 with decreased NIKKI and elevated velocities in proximal anastomosis and therefore LLE angiogram with possible intervention was indicated  Risks, benefits and alternatives were discussed with the patient who wished to proceed and consent was obtained  Operative Findings:  Occluded left fem-BK pop bypass, no outflow into the foot  Infra-renal aorta: patent without significant stenosis  Renal arteries: patent bilaterally without significant stenosis  Iliacs: bilateral common iliacs, external iliacs and internal iliac arteries without significant stenosis  Femorals: patent L CFA, the profunda is patent proximally but main trunk is occluded distally  Known occluded SFA and SFA stents  Occluded fem-pop bypass graft  Popliteal: P1-P3 occluded  Tibials: there is no distal reconstitution of any tibial vessels  No outflow into the foot      Plan:  No options for revascularization  Will have to discuss left AKA with patient  Complications:   None    Procedure and Technique:  The patient was brought to the operating room and placed on the operating room table in a supine fashion  After general endotracheal anesthesia was induced, bilateral groins were prepped with chlorhexidine and draped in the standard sterile fashion  Fluoroscopy was used to identify the femoral heads and ultrasound to identify the common femoral artery  The right common femoral artery was accessed retrograde using a micro-puncture needle and microcatheter advanced over 0 018 inch wire  An 0 035 inch stiff angled glidewire was advanced into the infrarenal abdominal aorta under fluoroscopy  The microcatheter was exchanged for a 5 Nigerien short sheath  A RBI catheter was advanced under fluoroscopy and positioned in the pararenal aorta  Abdominal aortogram in the AP projection was carried out in usual fashion  The flush catheter was next withdrawn to the distal aorta  Pelvic angiography was carried out  The contralateral iliac system was next selected using the flush catheter/Glidewire accommodation  The flush catheter was advanced down to the external iliac artery  left lower extremity runoff performed in a stepwise manner with the above findings  To assess for better runoff and possible target for redo bypass, decision was made to try to cross the occluded bypass  Next, the patient was given 9000 units of intravenous heparin  The short 5 Nigerien sheath was exchanged out for a 6 [place] long working sheath over a stiff angled glide wire  Under roadmap imaging and glide catheter/stiff angled Glidewire combination the proximal portion of was we thought was the left occluded bypass was cannulated and multiple attempts were used to try to cross without success  Ultimately, it appeared the entire bypass was occluded  There was no outflow target  No intervention was performed    The wires and sheaths were removed  A Starclose closure device was deployed successfully  The patient was reversed with 70mg of Protamine  The patient was extubated but due to severe bronchospasm required re-intubation and transfer to ICU  Dr Lauren Juan was scrubbed, present and participated in the entire case  Patient Disposition:  Critical Care Unit        SIGNATURE: Williams Angeles MD  DATE: March 24, 2023  TIME: 12:17 PM              Vascular Quality Initiative - Peripheral Vascular Intervention     Urgency: Elective    Functional Status:  Restricted in physically strenuous activity but ambulatory and able to carry out work of a light or sedentary nature  Ambulation: Amb = independently ambulatory    Leg Symptoms    Right: Asymptomatic:  documented peripheral arterial disease without symptoms of claudication or ischemic pain      Treatment of Native Artery to Maintain Bypass Patency?:  No  Left: Ischemic Rest Pain    COVID Information  COVID Symptoms Pre-Procedure: Asymptomatic    Treatment Delayed by Pandemic: None    Access   Number of Sites: 1     Access Site 1:     Side 1: Right    Site 1: Femoral Retrograde    Access Guidance 1:U/S    Largest Sheath Size 1: 6 Fr      Closure Device 1: StarClose      Number of Closure Devices: 1     Closure Device Outcome: Closure device successful         Procedure  Fluoro Time: 20 4 minutes  Contrast Volume: Visipaque 48 ml  DAP: 141 41 Gy cm2  CO2: no  Anticoagulant: Heparin  Protamine: Yes  If Creatinine is > 1 2 or missing, ASHLEY Prophylaxis none     Treatment Details  Indication: Occlusive Disease,  None     Post Procedure  Patient currently taking: Statin, Yes      Antiplatelet Medication, Yes    Procedure Complications: No

## 2023-03-24 NOTE — CONSULTS
Oncology Consult Note  Keily Ledezma 40 y o  female MRN: [id number]  [facility]: [id number]      Presenting Complaint: Femoral-popliteal bypass occlusion    Oncology History    No history exists  History of Presenting Illness:  20-year-old female with history of type 2 diabetes (on insulin), bipolar type I/PTSD, PAD with history of left percutaneous transluminal angioplasty (PTA) of the superficial femoral artery [stent placement-8/11/2021] with subsequent stent thrombectomy on 8/25/2021 and ultimately left common femoral artery-popliteal bypass was admitted on account of severe left lower extremity pain  She underwent left leg angiogram which indicated complete occlusion  Hematology was consulted on account of input regarding thrombosis  Was previously on 2 5 mg Xarelto twice daily daily and she was not compliant with the medication  She was also not taking her antiplatelet medication  She was admitted to MICU and required intubation status post angiogram   Patient's partner at bedside and provided most of the history  Review of Systems - As stated in the HPI otherwise the fourteen point review of systems was negative      ECOG PS: 1    Past Medical History:   Diagnosis Date   • Asthma    • Atherosclerosis    • Bipolar 1 disorder (HCC)    • COPD (chronic obstructive pulmonary disease) ([facility])    • Depression    • Diabetes mellitus ([facility] [address] )    • Hypertension    • Psychiatric disorder     cutting history   • PTSD (post-traumatic stress disorder)    • Schizoaffective disorder ([facility])    • Tendonitis        Social History     Socioeconomic History   • Marital status: /Civil Union     Spouse name: None   • Number of children: None   • Years of education: None   • Highest education level: None   Occupational History   • None   Tobacco Use   • Smoking status: Every Day     Packs/day: 1 00     Years: 20 00     Pack years: 20 00     Types: Cigarettes   • Smokeless tobacco: Former Quit date: 4/29/2022   Vaping Use   • Vaping Use: Never used   Substance and Sexual Activity   • Alcohol use: Not Currently     Comment: not currently   • Drug use: Not Currently     Types: Cocaine, Marijuana, "Crack" cocaine     Comment: 1 years clean from crack cocaine since 2022   • Sexual activity: Yes     Partners: Female, Male     Birth control/protection: None, Condom   Other Topics Concern   • None   Social History Narrative   • None     Social Determinants of Health     Financial Resource Strain: Not on file   Food Insecurity: Not on file   Transportation Needs: Not on file   Physical Activity: Not on file   Stress: Not on file   Social Connections: Not on file   Intimate Partner Violence: Not on file   Housing Stability: Not on file       Family History   Problem Relation Age of Onset   • Hypertension Mother    • Diabetes Mother    • HIV Mother    • Heart disease Mother    • No Known Problems Father        No Known Allergies      Current Facility-Administered Medications:   •  acetaminophen (TYLENOL) tablet 650 mg, 650 mg, Oral, Q4H PRN, Rommel Bañuelos MD  •  albuterol (PROVENTIL HFA,VENTOLIN HFA) inhaler 2 puff, 2 puff, Inhalation, Q4H PRN, Rommel Bañuelos MD  •  atorvastatin (LIPITOR) tablet 40 mg, 40 mg, Oral, Daily With Dinner, Rommel Bañuelos MD, 40 mg at 03/24/23 1548  •  chlorhexidine (PERIDEX) 0 12 % oral rinse 15 mL, 15 mL, Mouth/Throat, Q12H [address], Power Aiad, DO, 15 mL at 03/24/23 1339  •  EPINEPHrine 3000 mcg (STANDARD CONCENTRATION) IV in sodium chloride 0 9% 250 mL, 1-10 mcg/min, Intravenous, Titrated, Power Aiad DO, Stopped at 03/24/23 1453  •  fentaNYL 1000 mcg in sodium chloride 0 9% 100mL infusion, 100 mcg/hr, Intravenous, Continuous, Power Aiad, DO, Last Rate: 15 mL/hr at 03/24/23 1510, 150 mcg/hr at 03/24/23 1510  •  fentanyl citrate (PF) 100 MCG/2ML 50 mcg, 50 mcg, Intravenous, Q2H PRN, Power Aiad, DO  •  fentanyl citrate (PF) 100 MCG/2ML 50 mcg, 50 mcg, Intravenous, Q2H PRN, Gonzalez Vinson MD, 50 mcg at 03/24/23 1418  •  heparin (porcine) subcutaneous injection 7,500 Units, 7,500 Units, Subcutaneous, Q8H [address], Xenia Herrera MD, 7,500 Units at 03/24/23 1403  •  hydrALAZINE (APRESOLINE) injection 10 mg, 10 mg, Intravenous, Q6H PRN, Power Aiad, DO  •  insulin lispro (HumaLOG) 100 units/mL subcutaneous injection 2-12 Units, 2-12 Units, Subcutaneous, Q6H [address] **AND** Fingerstick Glucose (POCT), , , Q6H, Power Aiad, DO  •  ipratropium (ATROVENT) 0 02 % inhalation solution 0 5 mg, 0 5 mg, Nebulization, Q6H, Santana Coronel MD  •  labetalol (NORMODYNE) injection 20 mg, 20 mg, Intravenous, Q4H PRN, Power Aiad, DO  •  levalbuterol (XOPENEX) inhalation solution 1 25 mg, 1 25 mg, Nebulization, Q4H PRN, Power Aiad, DO  •  levalbuterol (Gala Sleek) inhalation solution 1 25 mg, 1 25 mg, Nebulization, Q6H, Santana Coronel MD  •  methylPREDNISolone sodium succinate (Solu-MEDROL) injection 40 mg, 40 mg, Intravenous, Q6H [address], Power Aiad, DO  •  midazolam (VERSED) injection 2 mg, 2 mg, Intravenous, Q4H PRN, Nancy Hathaway MD, 2 mg at 03/24/23 1438  •  nicotine (NICODERM CQ) 21 mg/24 hr TD 24 hr patch 21 mg, 21 mg, Transdermal, Daily, Power Aiad, DO, 21 mg at 03/24/23 1548  •  oxyCODONE (ROXICODONE) immediate release tablet 10 mg, 10 mg, Oral, Q4H PRN, Xenia Herrera MD  •  oxyCODONE (ROXICODONE) IR tablet 5 mg, 5 mg, Oral, Q4H PRN, Xenia Herrera MD  •  pantoprazole (PROTONIX) injection 40 mg, 40 mg, Intravenous, Q24H [address], Power Aiad, DO, 40 mg at 03/24/23 1548  •  propofol (DIPRIVAN) 1000 mg in 100 mL infusion (premix) **ADS Override Pull**, , , ,   •  propofol (DIPRIVAN) 1000 mg in 100 mL infusion (premix), 5-50 mcg/kg/min, Intravenous, Titrated, Power Sosaad DO, Last Rate: 61 mL/hr at 03/24/23 1459, 80 mcg/kg/min at 03/24/23 1459  •  QUEtiapine (SEROquel) tablet 200 mg, 200 mg, Oral, HS, Xenia Herrera MD      BP (!) 190/101   Pulse (!) 110   Temp 99 °F (37 2 °C)   Resp 22   Ht 5' 1" (1 549 m)   Wt 130 kg (287 lb 4 2 oz) LMP  (LMP Unknown)   SpO2 98%   BMI 54 28 kg/m²     General Appearance:    Intubated / sedated           Ears:    Normal external ear canals, both ears   Nose:   Nares normal, septum midline               Lungs:     Clear to auscultation bilaterally   Chest Wall:    No tenderness or deformity    Heart:    Regular rate and rhythm       Abdomen:     Soft, non-tender, bowel sounds +, no organomegaly           Extremities:   Extremities no cyanosis or edema       Skin:   no rash or icterus      Lymph nodes:   Cervical, supraclavicular, and axillary nodes normal           Recent Results (from the past 48 hour(s))   Fingerstick Glucose (POCT)    Collection Time: 03/24/23  9:30 AM   Result Value Ref Range    POC Glucose 265 (H) 65 - 140 mg/dl   CBC and differential    Collection Time: 03/24/23  1:38 PM   Result Value Ref Range    WBC 17 59 (H) 4 31 - 10 16 Thousand/uL    RBC 4 66 3 81 - 5 12 Million/uL    Hemoglobin 12 3 11 5 - 15 4 g/dL    Hematocrit 34 6 (L) 34 8 - 46 1 %    MCV 74 (L) 82 - 98 fL    MCH 26 4 (L) 26 8 - 34 3 pg    MCHC 35 5 31 4 - 37 4 g/dL    RDW 15 8 (H) 11 6 - 15 1 %    MPV 10 5 8 9 - 12 7 fL    Platelets 572 672 - 030 Thousands/uL    nRBC 0 /100 WBCs    Neutrophils Relative 70 43 - 75 %    Immat GRANS % 1 0 - 2 %    Lymphocytes Relative 21 14 - 44 %    Monocytes Relative 7 4 - 12 %    Eosinophils Relative 1 0 - 6 %    Basophils Relative 0 0 - 1 %    Neutrophils Absolute 12 34 (H) 1 85 - 7 62 Thousands/µL    Immature Grans Absolute 0 21 (H) 0 00 - 0 20 Thousand/uL    Lymphocytes Absolute 3 60 0 60 - 4 47 Thousands/µL    Monocytes Absolute 1 14 0 17 - 1 22 Thousand/µL    Eosinophils Absolute 0 23 0 00 - 0 61 Thousand/µL    Basophils Absolute 0 07 0 00 - 0 10 Thousands/µL   Comprehensive metabolic panel    Collection Time: 03/24/23  1:38 PM   Result Value Ref Range    Sodium 131 (L) 135 - 147 mmol/L    Potassium 4 0 3 5 - 5 3 mmol/L    Chloride 106 96 - 108 mmol/L    CO2 20 (L) 21 - 32 mmol/L    ANION GAP 5 4 - 13 mmol/L    BUN 11 5 - 25 mg/dL    Creatinine 0 64 0 60 - 1 30 mg/dL    Glucose 372 (H) 65 - 140 mg/dL    Calcium 7 9 (L) 8 3 - 10 1 mg/dL    Corrected Calcium 8 9 8 3 - 10 1 mg/dL    AST 23 5 - 45 U/L    ALT 36 12 - 78 U/L    Alkaline Phosphatase 114 46 - 116 U/L    Total Protein 6 4 6 4 - 8 4 g/dL    Albumin 2 7 (L) 3 5 - 5 0 g/dL    Total Bilirubin 0 58 0 20 - 1 00 mg/dL    eGFR 115 ml/min/1 73sq m   Magnesium    Collection Time: 03/24/23  1:38 PM   Result Value Ref Range    Magnesium 1 9 1 6 - 2 6 mg/dL   Phosphorus    Collection Time: 03/24/23  1:38 PM   Result Value Ref Range    Phosphorus 3 2 2 7 - 4 5 mg/dL   NT-BNP [facility] only    Collection Time: 03/24/23  1:38 PM   Result Value Ref Range    NT-proBNP 12 <125 pg/mL   Blood gas, arterial    Collection Time: 03/24/23  2:11 PM   Result Value Ref Range    pH, Arterial 7 304 (L) 7 350 - 7 450    pCO2, Arterial 44 7 (H) 36 0 - 44 0 mm Hg    pO2, Arterial 113 7 75 0 - 129 0 mm Hg    HCO3, Arterial 21 7 (L) 22 0 - 28 0 mmol/L    Base Excess, Arterial -4 6 mmol/L    O2 Content, Arterial 17 8 16 0 - 23 0 mL/dL    O2 HGB,Arterial  95 2 94 0 - 97 0 %    SOURCE Radial, Left     AXEL TEST Yes     Vent Type- AC AC     AC Rate 14     PC 20     Inspired Air (FIO2) 60     PEEP 8    Calcium, ionized    Collection Time: 03/24/23  3:43 PM   Result Value Ref Range    Calcium, Ionized 1 07 (L) 1 12 - 1 32 mmol/L   Platelet count    Collection Time: 03/24/23  3:43 PM   Result Value Ref Range    Platelets 798 644 - 607 Thousands/uL    MPV 10 7 8 9 - 12 7 fL         XR femur 2 views LEFT    Result Date: 3/4/2023  Narrative: LEFT FEMUR INDICATION:   pain  COMPARISON:  Compared with 8/24/2021 VIEWS:  XR FEMUR 2 VW LEFT FINDINGS: Bony density adjacent to the medial femoral condyle is heterotropic calcification    No acute fracture  Degenerative changes of the knee noted  No lytic or blastic osseous lesion  Vascular stent in the SFA       Impression: No acute osseous abnormality  Workstation performed: [id number]     VAS lower limb arterial duplex, complete bilateral    Result Date: 3/13/2023  Narrative:  [facility] REPORT CLINICAL: Indications:  Unspecified atherosclerosis of native arteries of extremities, left leg [I70 202]  Operative History: 2021-09-14 Left CFA to BK popliteal bypass graft with insitu GSV 2021-08-25 Left SFA stent angioplasty and popliteal stent placement 2021-08-11 Left SFA stent placement x 2 Risk Factors: HTN, Diabetes (IDDM), PAD and smoking (current) 0 5 ppd  Clinical Right Pressure:  135/ mm Hg, Left Pressure:  147/ mm Hg  FINDINGS:  Right                  Waveform  PSV (cm/s)  EDV  Post  Tibial           Biphasic                   Ant  Tibial            Biphasic                   Common Femoral Artery                   142   38  Prox Profunda                            89   14  Prox SFA                                131   28  Mid SFA                                 160   26  Dist SFA                                111   16  Proximal Pop                             71    6  Distal Pop                               95   24  Tibioperoneal                            93       Dist Post Tibial                         65   17  Dist  Ant  Tibial                        64   21  Dist Peroneal                            40    0   Left                   Impression      Waveform    PSV (cm/s)  EDV  Post  Tibial                           Monophasic                   Ant   Tibial                            Monophasic                   Inflow Anastomosis                                        243       High Thigh (Graft)                                        166       Mid Thigh (Graft)                                          70       Low Thigh (Graft)                                          65       Near Knee (Graft)                                          68       Outflow Anastomosis                                        99       Common Femoral Artery 96   17  Prox Profunda          Not visualized                               Tibioperoneal          Occluded                             0       Dist Post Tibial                                           36   11  Dist  Ant  Tibial      Occluded                             0    0  Dist Peroneal                                              22    7     CONCLUSION:  Impression: RIGHT LOWER LIMB: Diffuse disease throughout the femoral-popliteal and tibioperoneal trunk arteries with no significant stenosis  Ankle/Brachial index: 0 86 which is in the mild disease category (Prior 0 98 ) PVR/ PPG tracings are normal  Metatarsal pressure of 207 mm Hg Great toe pressure of 90 mm Hg, within the healing range  for a diabetic  Pedal Acceleration time: Dorsal:  110 ms     Plantar: 70 ms  LEFT LOWER LIMB: Evaluation demonstrates a patent CFA to BK popliteal bypass graft with a 50-75% stenosis of the proximal anastomosis  The tibioperoneal trunk is not identified suggest occlusion  Finding suggests anterior tibial artery is occluded  The posterior tibial artery is patent  Ankle/Brachial index: 0 65 which is in the moderate disease category (Prior 0 95 ) PVR tracings are dampened  The PPG waveform at the great toe is flat-line, suggesting diminished digital perfusion  Prior 59 mm Hg  Pedal Acceleration time: Dorsal:  110 ms     Plantar: 100 ms  Compared to previous study on 09/07/2022, the tibioperoneal trunk is occluded  Technical findings were given to Julio Gerard PA-C  SIGNATURE: Electronically Signed by: [facility] on 2023-03-13 05:31:30 PM    CT lower extremity w contrast left    Result Date: 3/4/2023  Narrative: CT left thigh with IV contrast INDICATION: Pain, atraumatic of the anterior thigh  Include knee -- has age indeterminate fracture of medial femoral condyle  COMPARISON: Concurrent x-ray, CT 9/22/21 TECHNIQUE: CT examination of the above was performed    This examination, like all CT scans performed in the [facility], was performed utilizing techniques to minimize radiation dose exposure, including the use of iterative reconstruction  and automated exposure control software  Sagittal and coronal two dimensional reconstructed images were also submitted for interpretation  IV Contrast: 100 mL of iohexol (OMNIPAQUE) Rad dose  921 mGy-cm FINDINGS: OSSEOUS STRUCTURES:  No fracture, dislocation or destructive osseous lesion  No traumatic deformity of the medial femoral condyle Heterotopic ossification noted proximally within the medial collateral ligament, this is often degenerative in etiology Small knee effusion VISUALIZED MUSCULATURE:  Unremarkable  SOFT TISSUES:  Long segment SFA stents Trace Basilio's cyst is noted with surrounding edema OTHER PERTINENT FINDINGS:  None  Impression: 1  No acute osseous abnormality 2  Heterotopic ossification within the medial collateral ligament 3  Trace Basilio's cyst with adjacent fluid, possibly ruptured 4  Small knee effusion Workstation performed: [id number]       Assessment and Plan:  56-year-old female with history of type 2 diabetes (on insulin), bipolar type I/PTSD, PAD with history of left percutaneous transluminal angioplasty (PTA) of the superficial femoral artery [stent placement-8/11/2021] with subsequent stent thrombectomy on 8/25/2021 and ultimately left common femoral artery-popliteal bypass was admitted on account of severe left lower extremity pain  She underwent left leg angiogram which indicated complete occlusion  Hematology was consulted on account of input regarding thrombosis  Was previously on 2 5 mg Xarelto twice daily daily and she was not compliant with the medication  She was also not taking her antiplatelet medication  She was admitted to MICU and required intubation status post angiogram   Patient's partner at bedside and provided most of the history     Patient currently on heparin prophylactic dose 7500 units every 8 hours which should be continued  We have ordered a thrombosis panel  Since patient was noncompliant with her Xarelto we will reevaluate her oral anticoagulation when patient is ready for discharge  Hematology on board and monitoring  Please reach out with any questions

## 2023-03-24 NOTE — PROCEDURES
Arterial Line Insertion    Date/Time: 3/24/2023 7:26 PM  Performed by: Mary Ayala MD  Authorized by: Mary Ayala MD     Patient location:  ICU  Other Assisting Provider: No    Consent:     Consent obtained:  Emergent situation  Universal protocol:     Patient identity confirmed:  Arm band  Indications:     Indications: frequent labs / infusion    Pre-procedure details:     Skin preparation:  Chlorhexidine    Preparation: Patient was prepped and draped in sterile fashion    Anesthesia (see MAR for exact dosages): Anesthesia method:  None  Procedure details:     Location / Tip of Catheter:  Radial    Laterality:  Right    Chandra's test performed: yes      Chandra's test abnormal: no      Needle gauge:  20 G    Placement technique:  Percutaneous    Number of attempts:  1    Successful placement: yes      Transducer: waveform confirmed    Post-procedure details:     Post-procedure:  Sutured and sterile dressing applied    CMS:  Normal    Patient tolerance of procedure:   Tolerated well, no immediate complications

## 2023-03-24 NOTE — INTERVAL H&P NOTE
H&P reviewed  After examining the patient I find no changes in the patients condition since the H&P had been written      Vitals:    03/24/23 0926   BP: (!) 156/111   Pulse: 100   Resp: 22   Temp: (!) 97 °F (36 1 °C)   SpO2: 96%

## 2023-03-24 NOTE — H&P
H&P Exam - Critical Care   Ike So 40 y o  female MRN: [id number]  Unit/Bed#: [facility] 04 Encounter: [id number]      -------------------------------------------------------------------------------------------------------------  Chief Complaint: Acute Respiratory Failure requiring Re-intubation     History of Present Illness   HX and PE limited by: patient is sedated and intubated     Ike So is a 40 y o  female with PMH of DM-2, COPD, Morbid Obesity, PAD s/p LLE bypass and stenting 87/5700 complicated with stent thrombosis, presented initially for Left Leg Angiogram & possible redo bypass with Vascular Surgery (please refer to surgery Zakia Saunders notes for details,) the previous bypass was entirely occluded, with no outflow, was given 9000 units IV heparin during the procedure which was then reversed with Protamine, on extubation - per sign out by anesthesia - had severe bronchospasm requiring re-intubation  Brought to MICU for further eval and management, intubated and sedated on arrival      See below A&P for relevant details     I met patient's spouse, Karyle Malta , at the MICU updated on patient's status, A&P , reviewed medical hx ; noted patient was not adherent with home medications "take them only sometimes"; all questions answered appropriately and critical care team will continue to follow and update  History obtained from chart review   on the way to MICU   Sign out from Vascular and Anesthesia    -------------------------------------------------------------------------------------------------------------  Assessment and Plan:    Neuro:   • Diagnosis: Sedated and intubated   - Mechanical ventilator, on volume control, A/C   - Weaning and extubation protocol   - CAM-ICU   - On Propofol gtt     • Diagnosis: Pain control:   - On Fentanyl gtt 100 mcg/hr  - PRN Fentanyl for agitation/breakthrough pain    • Diagnosis: Everyday smoker, 1 pack a day  - Nicoderm 21 mg QD       CV: • Diagnosis: Essential Hypertension   • Hypertensive Urgency   - Hx of HTN, listed home med Lisinopril 20 mg QD but not adherent with med  - Arrived to MICU on Epinephrine for reported bronchospasm, weaned off it   - PRN Labetalol 20 / Hydralazine 10 with parameters for SBP > 180 ; if not controlled considering Cardene drip    - continue monitor vitals       • Diagnosis: Peripheral Artery Disease   • S/p Left SFA - Popliteal bypass/stent, restenosis/occlusion   - Follows up vascular  - Here 03/24 for LLE Angiogram with possible intervention but found to have complete occlusion, heparin reversed, Protamine, and consideration for possible AKA   - Per vascular no indication for heparin gtt ; home Xarelto held   - SC Heparin (DVT Prophylaxis)   - Considering restart home ASA 81 mg QD & Statin - confirming with vascular       Pulm:  • Diagnosis: COPD, no PFT on file  • Currently everyday smoker 1 pack a day  • Acute Respiratory Failure with Hypoxia   • Intraoperative bronchospasm requiring reintubation 3/24    - Home medication include Advair twice daily and as needed albuterol ; not adherent    - PLAN:   • Intubated, on volume control , A/C 400/12/6/40%   • Respiratory protocol  • Duo-Neb Q6h + PRN Xopenex   • S/P Epinephrine   • Methylprednisolone 40 mg Q6h     GI:   • Diagnosis: GERD ; currently NPO   o Plan: Protonix 40 mg QD   o  PO diet when appropriate       :   • Diagnosis: blum placed for accurate in/out and groin wound noted    - Blum care   - In and out       F/E/N:   o NPO; intubated; considering tube feeding   o Checking electrolytes and replete PRN   o S/p 1 L LR ; IV hydration ; in / out ; daily weight   o PSEUDOHYPONATREMIA:   - Na (03/24) 131 ; corrected for Glucose (372) is 135     Heme/Onc:     Recurrent stenosis/occlusion of LLE bypass/stent   - Was evaluated by hem/onc 09/2021 but did not follow up   - Home medicaitons including Aspirin and Xarelto; but per patient's spouse, the patient was not adherent with the medications "takes them sometimes"    - Vascular Surgery consulted Hematology with above concern of clotting at early age suspected clotting disorder, input appreciated  will need outpatient follow up for further evaluation and management  - See PAD / Michail Copping A&P above  Leukocytosis   - Leukocytosis likely reactive, post-op, afebrile , no Bandemia   - monitor vitals, trend temp and WBC, consider infection workup if concerning   - per note, has hx of MRSA infection 2021 and has R groin wound but does not look infected       Endo:   o Diagnosis: Type 2 Diabetes Mellitus with hyperglycemia   - A1C 12/22 was 9 8   - Was not adherent with home meds, including Tulicity, Lantus 40 u QD and Metformin 1000 BID    - BG elevated 530 >> 265 >> 372 ; AG WNL   PLAN   - Lantus 30 QD   - Starting SSI Q6H - Algorithm 4   - Currently NPO, consider Low Carb diet      ID:   o Diagnosis: No acute issue - see Leuykocytosis A&P above   o Hx of MRSA infection   o Has R groin wound, does not look infected       MSK/Skin:   Diagnosis: R groin wound , chronic    - Wound care    - blum in place     Disposition: Admit to Critical Care   Code Status: Level 1 - Full Code  --------------------------------------------------------------------------------------------------------------  Review of Systems    Review of systems was unable to be performed secondary to sedated and intubated    Physical Exam  Constitutional:       Appearance: She is obese  She is ill-appearing  Comments: Sedated and intubated ; RASS -2    HENT:      Right Ear: External ear normal       Left Ear: External ear normal       Nose: Nose normal  No congestion or rhinorrhea  Mouth/Throat:      Pharynx: No posterior oropharyngeal erythema  Comments: Intubate/OT tube in place   Eyes:      General: No scleral icterus  Right eye: No discharge  Left eye: No discharge  Extraocular Movements: Extraocular movements intact  Conjunctiva/sclera: Conjunctivae normal       Pupils: Pupils are equal, round, and reactive to light  Cardiovascular:      Rate and Rhythm: Normal rate and regular rhythm  Pulses: Normal pulses  Heart sounds: Normal heart sounds  No murmur heard  Pulmonary:      Breath sounds: Wheezing present  No rhonchi or rales  Comments: @ A/C ; bilateral exp wheezing   Abdominal:      General: There is no distension  Palpations: There is no mass  Comments: Obese , soft abdomen , normal bowel sounds    Musculoskeletal:      Right lower leg: No edema  Left lower leg: No edema  Skin:     Capillary Refill: Capillary refill takes less than 2 seconds  Neurological:      Comments: Neuro exam limited ; sedated and intubated          --------------------------------------------------------------------------------------------------------------  Vitals:   Vitals:    03/24/23 1008 03/24/23 1300 03/24/23 1316 03/24/23 1332   BP:   168/95    Pulse:   98 96   Resp:       Temp:       TempSrc:       SpO2:   98% 99%   Weight: 127 kg (280 lb) 130 kg (287 lb 4 2 oz)     Height: 5' 1" (1 549 m) 5' 1" (1 549 m)       Temp  Min: 97 °F (36 1 °C)  Max: 97 °F (36 1 °C)  IBW (Ideal Body Weight): 47 8 kg  Height: 5' 1" (154 9 cm)  Body mass index is 54 28 kg/m²  Laboratory and Diagnostics:        Invalid input(s):  EOSPCT                        ABG:    VBG:          Micro:        EKG: ordered, to follow  Imaging: I have personally reviewed pertinent reports        Historical Information   Past Medical History:   Diagnosis Date   • Asthma    • Atherosclerosis    • Bipolar 1 disorder ([place] [address] )    • COPD (chronic obstructive pulmonary disease) ([place] [address] )    • Depression    • Diabetes mellitus ([place] [address] )    • Hypertension    • Psychiatric disorder     cutting history   • PTSD (post-traumatic stress disorder)    • Schizoaffective disorder ([address] )    • Tendonitis      Past Surgical History:   Procedure Laterality Date   • BYPASS FEMORAL-POPLITEAL Left 2021    Procedure: Left Common Femoral Below Knee to Popliteal Bypass with Insitu GSV graft    Left Lower Extremity Angiogram;  Surgeon: Rosa Maria Rosas MD;  Location: [facility];  Service: Vascular   •  SECTION     • EAR SURGERY     • IR LOWER EXTREMITY ANGIOGRAM  2021   • IR LOWER EXTREMITY ANGIOGRAM  2021   • TUBAL LIGATION       Social History   Social History     Substance and Sexual Activity   Alcohol Use Not Currently    Comment: not currently     Social History     Substance and Sexual Activity   Drug Use Not Currently   • Types: Cocaine, Marijuana, "Crack" cocaine    Comment: 1 years clean from crack cocaine since      Social History     Tobacco Use   Smoking Status Every Day   • Packs/day:  00   • Years: 20    • Pack years:    • Types: Cigarettes   Smokeless Tobacco Former   • Quit date: 2022     Exercise History:   Family History:   Family History   Problem Relation Age of Onset   • Hypertension Mother    • Diabetes Mother    • HIV Mother    • Heart disease Mother    • No Known Problems Father      I have reviewed this patient's family history and commented on sigificant items within the HPI      Medications:  Current Facility-Administered Medications   Medication Dose Route Frequency   • acetaminophen (TYLENOL) tablet 650 mg  650 mg Oral Q4H PRN   • albuterol (PROVENTIL HFA,VENTOLIN HFA) inhaler 2 puff  2 puff Inhalation Q4H PRN   • atorvastatin (LIPITOR) tablet 40 mg  40 mg Oral Daily With Dinner   • chlorhexidine (PERIDEX) 0 12 % oral rinse 15 mL  15 mL Mouth/Throat Q12H [address]   • EPINEPHrine 3000 mcg (STANDARD CONCENTRATION) IV in sodium chloride 0 9% 250 mL  1-10 mcg/min Intravenous Titrated   • fentaNYL 1000 mcg in sodium chloride 0 9% 100mL infusion  100 mcg/hr Intravenous Continuous   • heparin (porcine) subcutaneous injection 7,500 Units  7,500 Units Subcutaneous Q8H [address]   • oxyCODONE (ROXICODONE) immediate release tablet 10 mg  10 mg Oral Q4H PRN   • oxyCODONE (ROXICODONE) IR tablet 5 mg  5 mg Oral Q4H PRN   • prazosin (MINIPRESS) capsule 1 mg  1 mg Oral HS   • propofol (DIPRIVAN) 1000 mg in 100 mL infusion (premix) **ADS Override Pull**       • propofol (DIPRIVAN) 1000 mg in 100 mL infusion (premix)  5-50 mcg/kg/min Intravenous Titrated   • propofol (DIPRIVAN) 200 MG/20ML bolus injection 100 mg  100 mg Intravenous Once   • QUEtiapine (SEROquel) tablet 200 mg  200 mg Oral HS     Home medications:  Prior to Admission Medications   Prescriptions Last Dose Informant Patient Reported? Taking? Advair -21 MCG/ACT inhaler 3/17/2023  No Yes   Sig: INHALE 2 PUFFS BY MOUTH TWICE DAILY   RINSE MOUTH AFTER USE   Alcohol Swabs (Pharmacist Choice Alcohol) PADS   No No   Sig: USE 3-4 TIMES AS DIRECTED     Blood Glucose Monitoring Suppl (True Metrix Air Glucose Meter) w/Device KIT   No No   Sig: use as directed   Patient not taking: No sig reported   Blood Pressure Monitoring (Blood Pressure Monitor Automat) KATLYN   No No   Sig: Use Daily as Directed   Patient not taking: No sig reported   Diclofenac Sodium (VOLTAREN) 1 % 3/22/2023  No No   Sig: APPLY 2 G TOPICALLY 4 (FOUR) TIMES A DAY   Patient not taking: Reported on 6/27/2022   Global Inject Ease Lancets 30G MISC   No No   Sig: USE 3 (THREE) TIMES A DAY   Patient not taking: Reported on 9/12/2022   Insulin Glargine Solostar (Lantus SoloStar) 100 UNIT/ML SOPN 3/24/2023 at 0700  No Yes   Sig: Inject 0 4 mL (40 Units total) under the skin daily Inject 20 units in the morning and 20 units before bed daily   Insulin Pen Needle (Comfort EZ Pen Needles) 32G X 4 MM MISC   No No   Sig: Inject under the skin 4 (four) times a day (before meals and at bedtime)   Lancet Devices (Lancing Device) MISC   No No   Sig: USE AS DIRECTED   OneTouch Ultra test strip   No No   Sig: USE 2-3 TIMES A DAY   QUEtiapine (SEROquel) 200 mg tablet 3/17/2023  Yes Yes   Sig: Take by mouth daily at bedtime   albuterol (PROVENTIL HFA,VENTOLIN HFA) 90 mcg/act inhaler 3/24/2023 at 1000  No Yes   Sig: Inhale 2 puffs every 4 (four) hours as needed for wheezing or shortness of breath   atorvastatin (LIPITOR) 40 mg tablet 3/17/2023  Yes No   Patient not taking: Reported on 6/27/2022   dulaglutide (Trulicity) 1 5 MOLLY/7 1QP injection 3/23/2023  No Yes   Sig: Inject 0 5 mL (1 5 mg total) under the skin once a week   hydrOXYzine HCL (ATARAX) 50 mg tablet 3/10/2023  Yes Yes   Sig: Take 50 mg by mouth 3 (three) times a day   insulin lispro (HumaLOG) 100 units/mL injection 3/22/2023  No No   Sig: INJECT 3 UNITS UNDER THE SKIN 2 (TWO) TIMES A DAY WITH MEALS   lidocaine (XYLOCAINE) 5 % ointment 3/22/2023  Yes Yes   Sig: Apply 1 application   topically 3 (three) times a day   lisinopril (ZESTRIL) 20 mg tablet 3/21/2023  No Yes   Sig: TAKE 1 TABLET (20 MG TOTAL) BY MOUTH DAILY   metFORMIN (GLUCOPHAGE) 1000 MG tablet 3/17/2023  No Yes   Sig: TAKE ONE (1) TABLET BY MOUTH TWICE DAILY WITH FOOD   nicotine (NICODERM CQ) 21 mg/24 hr TD 24 hr patch   No No   Sig: Place 1 patch on the skin daily   Patient not taking: Reported on 6/27/2022   prazosin (MINIPRESS) 1 mg capsule 3/17/2023  Yes Yes   Sig: Take 1 mg by mouth daily at bedtime   rivaroxaban (Xarelto) 2 5 mg tablet 3/20/2023  No Yes   Sig: Take 1 tablet (2 5 mg total) by mouth 2 (two) times a day   topiramate (TOPAMAX) 25 mg tablet 3/19/2023  Yes Yes   Sig: TAKE 1 TABLET TWICE DAILY X7DAYS,THEN 1 TAB THREE TIMES DAILY X7DAYS, THEN 2 TABS TWICE DAILY      Facility-Administered Medications Last Administration Doses Remaining   lidocaine (LMX) 4 % cream None recorded         Allergies:  No Known Allergies  ------------------------------------------------------------------------------------------------------------  Advance Directive and Living Will:      Power of Attorney:    POLST:    ------------------------------------------------------------------------------------------------------------  Anticipated Length of Stay is > 2 midnights    Care Time Delivered:   55 minutes      DO Lizeth        Portions of the record may have been created with voice recognition software  Occasional wrong word or "sound a like" substitutions may have occurred due to the inherent limitations of voice recognition software    Read the chart carefully and recognize, using context, where substitutions have occurred

## 2023-03-24 NOTE — ANESTHESIA POSTPROCEDURE EVALUATION
Post-Op Assessment Note    CV Status:  Stable  Pain Score: 0       Mental Status:  Unresponsive   Airway Patency:  Adequate  Airway: intubated      Post Op Vitals Reviewed: Yes      Staff: Anesthesiologist       Patient experienced severe intraoperative bronchospasm compounded by inadequate ventilation/oxygenation on pressure-support ventilation  Bronchospasm improved somewhat with albuterol puffs and epinephrine boluses  SpO2 decreased down to 70s with improvement to >90% after a few minutes  Decision made to transfer to ICU intubated/sedated as it was unlikely she'd be able to maintain adequate oxygenation/ventilation without ventilatory support  She was on epinephrine gtt @4mcg/min  No notable events documented

## 2023-03-25 ENCOUNTER — APPOINTMENT (OUTPATIENT)
Dept: RADIOLOGY | Facility: HOSPITAL | Age: 38
End: 2023-03-25

## 2023-03-25 LAB
ALBUMIN SERPL BCP-MCNC: 2.7 G/DL (ref 3.5–5)
ALP SERPL-CCNC: 109 U/L (ref 46–116)
ALT SERPL W P-5'-P-CCNC: 35 U/L (ref 12–78)
ANION GAP SERPL CALCULATED.3IONS-SCNC: 6 MMOL/L (ref 4–13)
ANISOCYTOSIS BLD QL SMEAR: PRESENT
AST SERPL W P-5'-P-CCNC: 19 U/L (ref 5–45)
BASOPHILS # BLD MANUAL: 0 THOUSAND/UL (ref 0–0.1)
BASOPHILS NFR MAR MANUAL: 0 % (ref 0–1)
BILIRUB SERPL-MCNC: 0.23 MG/DL (ref 0.2–1)
BUN SERPL-MCNC: 10 MG/DL (ref 5–25)
CALCIUM ALBUM COR SERPL-MCNC: 9.2 MG/DL (ref 8.3–10.1)
CALCIUM SERPL-MCNC: 8.2 MG/DL (ref 8.3–10.1)
CHLORIDE SERPL-SCNC: 109 MMOL/L (ref 96–108)
CO2 SERPL-SCNC: 21 MMOL/L (ref 21–32)
CREAT SERPL-MCNC: 0.58 MG/DL (ref 0.6–1.3)
DEPRECATED AT III PPP: 115 % OF NORMAL (ref 92–136)
EOSINOPHIL # BLD MANUAL: 0 THOUSAND/UL (ref 0–0.4)
EOSINOPHIL NFR BLD MANUAL: 0 % (ref 0–6)
ERYTHROCYTE [DISTWIDTH] IN BLOOD BY AUTOMATED COUNT: 15.7 % (ref 11.6–15.1)
GFR SERPL CREATININE-BSD FRML MDRD: 118 ML/MIN/1.73SQ M
GLUCOSE SERPL-MCNC: 156 MG/DL (ref 65–140)
GLUCOSE SERPL-MCNC: 160 MG/DL (ref 65–140)
GLUCOSE SERPL-MCNC: 161 MG/DL (ref 65–140)
GLUCOSE SERPL-MCNC: 168 MG/DL (ref 65–140)
GLUCOSE SERPL-MCNC: 183 MG/DL (ref 65–140)
GLUCOSE SERPL-MCNC: 188 MG/DL (ref 65–140)
GLUCOSE SERPL-MCNC: 191 MG/DL (ref 65–140)
GLUCOSE SERPL-MCNC: 198 MG/DL (ref 65–140)
GLUCOSE SERPL-MCNC: 198 MG/DL (ref 65–140)
GLUCOSE SERPL-MCNC: 200 MG/DL (ref 65–140)
GLUCOSE SERPL-MCNC: 211 MG/DL (ref 65–140)
GLUCOSE SERPL-MCNC: 231 MG/DL (ref 65–140)
GLUCOSE SERPL-MCNC: 344 MG/DL (ref 65–140)
HCT VFR BLD AUTO: 35 % (ref 34.8–46.1)
HGB BLD-MCNC: 12.4 G/DL (ref 11.5–15.4)
LYMPHOCYTES # BLD AUTO: 0.55 THOUSAND/UL (ref 0.6–4.47)
LYMPHOCYTES # BLD AUTO: 4 % (ref 14–44)
MCH RBC QN AUTO: 26.2 PG (ref 26.8–34.3)
MCHC RBC AUTO-ENTMCNC: 35.4 G/DL (ref 31.4–37.4)
MCV RBC AUTO: 74 FL (ref 82–98)
MICROCYTES BLD QL AUTO: PRESENT
MONOCYTES # BLD AUTO: 0.41 THOUSAND/UL (ref 0–1.22)
MONOCYTES NFR BLD: 3 % (ref 4–12)
NEUTROPHILS # BLD MANUAL: 12.66 THOUSAND/UL (ref 1.85–7.62)
NEUTS BAND NFR BLD MANUAL: 4 % (ref 0–8)
NEUTS SEG NFR BLD AUTO: 88 % (ref 43–75)
PLATELET # BLD AUTO: 311 THOUSANDS/UL (ref 149–390)
PLATELET BLD QL SMEAR: ADEQUATE
PMV BLD AUTO: 10.4 FL (ref 8.9–12.7)
POIKILOCYTOSIS BLD QL SMEAR: PRESENT
POLYCHROMASIA BLD QL SMEAR: PRESENT
POTASSIUM SERPL-SCNC: 3.9 MMOL/L (ref 3.5–5.3)
PROT SERPL-MCNC: 6.5 G/DL (ref 6.4–8.4)
RBC # BLD AUTO: 4.74 MILLION/UL (ref 3.81–5.12)
RBC MORPH BLD: PRESENT
SODIUM SERPL-SCNC: 136 MMOL/L (ref 135–147)
TARGETS BLD QL SMEAR: PRESENT
VARIANT LYMPHS # BLD AUTO: 1 %
WBC # BLD AUTO: 13.76 THOUSAND/UL (ref 4.31–10.16)

## 2023-03-25 RX ORDER — GABAPENTIN 300 MG/1
300 CAPSULE ORAL 2 TIMES DAILY
Status: DISCONTINUED | OUTPATIENT
Start: 2023-03-25 | End: 2023-03-25

## 2023-03-25 RX ORDER — ASPIRIN 81 MG/1
81 TABLET, CHEWABLE ORAL DAILY
Status: DISPENSED | OUTPATIENT
Start: 2023-03-25

## 2023-03-25 RX ORDER — LEVALBUTEROL 1.25 MG/.5ML
1.25 SOLUTION, CONCENTRATE RESPIRATORY (INHALATION)
Status: DISCONTINUED | OUTPATIENT
Start: 2023-03-25 | End: 2023-03-31

## 2023-03-25 RX ORDER — SODIUM CHLORIDE FOR INHALATION 0.9 %
VIAL, NEBULIZER (ML) INHALATION
Status: COMPLETED
Start: 2023-03-25 | End: 2023-03-25

## 2023-03-25 RX ORDER — GABAPENTIN 250 MG/5ML
300 SOLUTION ORAL 2 TIMES DAILY
Status: DISCONTINUED | OUTPATIENT
Start: 2023-03-25 | End: 2023-03-26

## 2023-03-25 RX ADMIN — IPRATROPIUM BROMIDE 0.5 MG: 0.5 SOLUTION RESPIRATORY (INHALATION) at 07:26

## 2023-03-25 RX ADMIN — LEVALBUTEROL HYDROCHLORIDE 1.25 MG: 1.25 SOLUTION, CONCENTRATE RESPIRATORY (INHALATION) at 13:39

## 2023-03-25 RX ADMIN — PROPOFOL 80 MCG/KG/MIN: 10 INJECTION, EMULSION INTRAVENOUS at 08:30

## 2023-03-25 RX ADMIN — PROPOFOL 80 MCG/KG/MIN: 10 INJECTION, EMULSION INTRAVENOUS at 14:35

## 2023-03-25 RX ADMIN — METHYLPREDNISOLONE SODIUM SUCCINATE 40 MG: 40 INJECTION, POWDER, FOR SOLUTION INTRAMUSCULAR; INTRAVENOUS at 11:59

## 2023-03-25 RX ADMIN — IPRATROPIUM BROMIDE 0.5 MG: 0.5 SOLUTION RESPIRATORY (INHALATION) at 19:40

## 2023-03-25 RX ADMIN — PROPOFOL 80 MCG/KG/MIN: 10 INJECTION, EMULSION INTRAVENOUS at 03:30

## 2023-03-25 RX ADMIN — PROPOFOL 80 MCG/KG/MIN: 10 INJECTION, EMULSION INTRAVENOUS at 02:30

## 2023-03-25 RX ADMIN — ATORVASTATIN CALCIUM 40 MG: 40 TABLET, FILM COATED ORAL at 16:29

## 2023-03-25 RX ADMIN — Medication 150 MCG/HR: at 22:24

## 2023-03-25 RX ADMIN — Medication 150 MCG/HR: at 14:56

## 2023-03-25 RX ADMIN — NICOTINE 21 MG: 21 PATCH, EXTENDED RELEASE TRANSDERMAL at 08:10

## 2023-03-25 RX ADMIN — METHYLPREDNISOLONE SODIUM SUCCINATE 40 MG: 40 INJECTION, POWDER, FOR SOLUTION INTRAMUSCULAR; INTRAVENOUS at 00:42

## 2023-03-25 RX ADMIN — PROPOFOL 55 MCG/KG/MIN: 10 INJECTION, EMULSION INTRAVENOUS at 18:47

## 2023-03-25 RX ADMIN — KETAMINE HYDROCHLORIDE 0.5 MG/KG/HR: 100 INJECTION INTRAMUSCULAR; INTRAVENOUS at 02:28

## 2023-03-25 RX ADMIN — ISODIUM CHLORIDE 3 ML: 0.03 SOLUTION RESPIRATORY (INHALATION) at 04:13

## 2023-03-25 RX ADMIN — KETAMINE HYDROCHLORIDE 0.5 MG/KG/HR: 100 INJECTION INTRAMUSCULAR; INTRAVENOUS at 10:03

## 2023-03-25 RX ADMIN — MIDAZOLAM 2 MG: 1 INJECTION INTRAMUSCULAR; INTRAVENOUS at 00:00

## 2023-03-25 RX ADMIN — PANTOPRAZOLE SODIUM 40 MG: 40 INJECTION, POWDER, FOR SOLUTION INTRAVENOUS at 08:10

## 2023-03-25 RX ADMIN — LEVALBUTEROL HYDROCHLORIDE 1.25 MG: 1.25 SOLUTION, CONCENTRATE RESPIRATORY (INHALATION) at 02:00

## 2023-03-25 RX ADMIN — Medication 150 MCG/HR: at 01:17

## 2023-03-25 RX ADMIN — PROPOFOL 70 MCG/KG/MIN: 10 INJECTION, EMULSION INTRAVENOUS at 16:30

## 2023-03-25 RX ADMIN — IPRATROPIUM BROMIDE 0.5 MG: 0.5 SOLUTION RESPIRATORY (INHALATION) at 02:00

## 2023-03-25 RX ADMIN — LEVALBUTEROL 1.25 MG: 1.25 SOLUTION, CONCENTRATE RESPIRATORY (INHALATION) at 04:13

## 2023-03-25 RX ADMIN — PROPOFOL 80 MCG/KG/MIN: 10 INJECTION, EMULSION INTRAVENOUS at 10:06

## 2023-03-25 RX ADMIN — HEPARIN SODIUM 7500 UNITS: 5000 INJECTION INTRAVENOUS; SUBCUTANEOUS at 21:21

## 2023-03-25 RX ADMIN — HEPARIN SODIUM 7500 UNITS: 5000 INJECTION INTRAVENOUS; SUBCUTANEOUS at 12:49

## 2023-03-25 RX ADMIN — MIDAZOLAM 2 MG: 1 INJECTION INTRAMUSCULAR; INTRAVENOUS at 08:10

## 2023-03-25 RX ADMIN — CHLORHEXIDINE GLUCONATE 0.12% ORAL RINSE 15 ML: 1.2 LIQUID ORAL at 21:21

## 2023-03-25 RX ADMIN — PROPOFOL 55 MCG/KG/MIN: 10 INJECTION, EMULSION INTRAVENOUS at 21:05

## 2023-03-25 RX ADMIN — GABAPENTIN 300 MG: 250 SOLUTION ORAL at 14:53

## 2023-03-25 RX ADMIN — ASPIRIN 81 MG CHEWABLE TABLET 81 MG: 81 TABLET CHEWABLE at 08:10

## 2023-03-25 RX ADMIN — PROPOFOL 80 MCG/KG/MIN: 10 INJECTION, EMULSION INTRAVENOUS at 12:50

## 2023-03-25 RX ADMIN — PROPOFOL 80 MCG/KG/MIN: 10 INJECTION, EMULSION INTRAVENOUS at 06:45

## 2023-03-25 RX ADMIN — PROPOFOL 80 MCG/KG/MIN: 10 INJECTION, EMULSION INTRAVENOUS at 11:15

## 2023-03-25 RX ADMIN — CLOPIDOGREL BISULFATE 75 MG: 75 TABLET ORAL at 08:10

## 2023-03-25 RX ADMIN — IPRATROPIUM BROMIDE 0.5 MG: 0.5 SOLUTION RESPIRATORY (INHALATION) at 13:40

## 2023-03-25 RX ADMIN — PROPOFOL 80 MCG/KG/MIN: 10 INJECTION, EMULSION INTRAVENOUS at 05:07

## 2023-03-25 RX ADMIN — CHLORHEXIDINE GLUCONATE 0.12% ORAL RINSE 15 ML: 1.2 LIQUID ORAL at 08:10

## 2023-03-25 RX ADMIN — Medication 150 MCG/HR: at 08:18

## 2023-03-25 RX ADMIN — FENTANYL CITRATE 100 MCG: 50 INJECTION INTRAMUSCULAR; INTRAVENOUS at 20:40

## 2023-03-25 RX ADMIN — QUETIAPINE FUMARATE 200 MG: 100 TABLET ORAL at 21:21

## 2023-03-25 RX ADMIN — PROPOFOL 80 MCG/KG/MIN: 10 INJECTION, EMULSION INTRAVENOUS at 00:48

## 2023-03-25 RX ADMIN — LEVALBUTEROL HYDROCHLORIDE 1.25 MG: 1.25 SOLUTION, CONCENTRATE RESPIRATORY (INHALATION) at 19:40

## 2023-03-25 RX ADMIN — LEVALBUTEROL HYDROCHLORIDE 1.25 MG: 1.25 SOLUTION, CONCENTRATE RESPIRATORY (INHALATION) at 07:26

## 2023-03-25 RX ADMIN — SODIUM CHLORIDE 9 UNITS/HR: 9 INJECTION, SOLUTION INTRAVENOUS at 09:33

## 2023-03-25 RX ADMIN — METHYLPREDNISOLONE SODIUM SUCCINATE 40 MG: 40 INJECTION, POWDER, FOR SOLUTION INTRAMUSCULAR; INTRAVENOUS at 05:45

## 2023-03-25 RX ADMIN — PROPOFOL 50 MCG/KG/MIN: 10 INJECTION, EMULSION INTRAVENOUS at 23:04

## 2023-03-25 RX ADMIN — HEPARIN SODIUM 7500 UNITS: 5000 INJECTION INTRAVENOUS; SUBCUTANEOUS at 05:45

## 2023-03-25 RX ADMIN — METHYLPREDNISOLONE SODIUM SUCCINATE 40 MG: 40 INJECTION, POWDER, FOR SOLUTION INTRAMUSCULAR; INTRAVENOUS at 18:03

## 2023-03-25 RX ADMIN — METHYLPREDNISOLONE SODIUM SUCCINATE 40 MG: 40 INJECTION, POWDER, FOR SOLUTION INTRAMUSCULAR; INTRAVENOUS at 23:04

## 2023-03-25 NOTE — UTILIZATION REVIEW
Initial Clinical Review    Elective  Outpatient  surgical procedure  Age/Sex: 40 y o  female     Surgery Date: 3/24/2023    Procedure: Ultrasound guided right common femoral artery access  Abdominal aortogram  Left lower extremity arteriogram  Selective 3rd order catheterization  Starclose closure of right groin access  Radiologic supervision and interpretation of imaging        Anesthesia: General   Operative Findings:   Occluded left fem-BK pop bypass, no outflow into the foot        Infra-renal aorta: patent without significant stenosis  Renal arteries: patent bilaterally without significant stenosis  Iliacs: bilateral common iliacs, external iliacs and internal iliac arteries without significant stenosis  Femorals: patent L CFA, the profunda is patent proximally but main trunk is occluded distally  Known occluded SFA and SFA stents  Occluded fem-pop bypass graft  Popliteal: P1-P3 occluded  Tibials: there is no distal reconstitution of any tibial vessels  No outflow into the foot      Plan:  No options for revascularization  Will have to discuss left AKA with patient  Post Procedure-  Presented for LLE angiogram & possible redo bypass with vascular Surgery  The previous bypass was entirely occluded -  Post op she had severe bronchospasm, asthma exacerbation  and was re- intubated  Admitted inpatient to MICU for monitoring , A line placed  Consult Hematology - 3/24 - she has a complicated vascular history  Unclear if she is compliant with her anticoagulant medications  Shalini Luna is now in ICU intubated, recent angiogram demonstrates complete occlusion  Thrombophilia eval will not change her current treatment at least in the acute setting  Pt with femoral - popliteal bypass occlusion  She is currently on heparin  Dose 78979 units q8 which should be continued  Thrombosis panel ordered  Due to her noncomplaince with Xarelto we will reevaluate her oral anticoagulation when she is ready for discharge         POD#1 Progress Note: She remains  Sedated intubated on vent  Solumedrol ic, bronchodilators( DuoNebs) , Insulin infusion, Start Tube feeds, restart home medications and gabapentin 300mg bid will increase as needed  Vasc surg note - SANJANA smtih with occluded bypass and no reasonable distal target for revascularization  Will ultimately require major amputation  Admission Orders: Date/Time/Statement:   Admission Orders (From admission, onward)     Ordered        03/24/23 1242  Inpatient Admission  Once                      Orders Placed This Encounter   Procedures   • Inpatient Admission     Standing Status:   Standing     Number of Occurrences:   1     Order Specific Question:   Level of Care     Answer:   Critical Care [15]     Order Specific Question:   Estimated length of stay     Answer:   More than 2 Midnights     Order Specific Question:   Certification     Answer:   I certify that inpatient services are medically necessary for this patient for a duration of greater than two midnights  See H&P and MD Progress Notes for additional information about the patient's course of treatment       Vital Signs: /69   Pulse (!) 106   Temp 99 °F (37 2 °C)   Resp 14   Ht 5' 1" (1 549 m)   Wt 130 kg (287 lb 4 2 oz)   LMP  (LMP Unknown)   SpO2 97%   BMI 54 28 kg/m²   Date/Time Temp Pulse Resp BP MAP (mmHg) Arterial Line BP MAP SpO2 FiO2 (%) O2 Device   03/25/23 1340 -- -- -- -- -- -- -- 97 % -- --   03/25/23 1200 99 °F (37 2 °C) 106 Abnormal  14 -- -- 114/62 80 mmHg 97 % -- --   03/25/23 1115 -- -- -- -- -- -- -- 97 % -- --   03/25/23 1100 99 °F (37 2 °C) 108 Abnormal  14 -- -- 116/96 108 mmHg 97 % -- --   03/25/23 1000 99 °F (37 2 °C) 106 Abnormal  15 -- -- 128/60 82 mmHg 97 % -- --   03/25/23 0900 99 °F (37 2 °C) 110 Abnormal  14 -- -- 121/72 76 mmHg 96 % -- --   03/25/23 0800 99 °F (37 2 °C) 108 Abnormal  14 -- -- 148/74 98 mmHg 97 % 40 Ventilator   03/25/23 0728 99 °F (37 2 °C) 106 Abnormal  14 -- -- -- -- 97 % -- --   03/25/23 0700 99 °F (37 2 °C) 108 Abnormal  -- -- -- 112/62 82 mmHg 98 % -- --   03/25/23 0600 99 °F (37 2 °C) 110 Abnormal  -- -- -- 124/58 80 mmHg 97 % -- --   03/25/23 0500 99 3 °F (37 4 °C) 112 Abnormal  -- -- -- 128/70 90 mmHg 97 % -- --   03/25/23 0413 -- -- -- -- -- -- -- 94 % -- --   03/25/23 0400 99 3 °F (37 4 °C) 108 Abnormal  -- -- -- 134/66 90 mmHg 97 % -- --   03/25/23 0300 99 3 °F (37 4 °C) 112 Abnormal  -- -- -- 114/112 112 mmHg 97 % -- --   03/25/23 0200 99 7 °F (37 6 °C) 108 Abnormal  -- -- -- 116/112 114 mmHg 97 % -- --   03/25/23 0100 100 °F (37 8 °C) 110 Abnormal  -- -- -- 126/72 92 mmHg 97 % -- --   03/25/23 0030 -- -- -- -- -- -- -- 97 % -- --   03/25/23 0000 100 4 °F (38 °C) 114 Abnormal  -- -- -- 150/82 108 mmHg 88 % Abnormal  -- --   03/24/23 2359 100 4 °F (38 °C) 116 Abnormal  -- -- -- 150/90 114 mmHg 82 % Abnormal  -- --   03/24/23 2300 100 4 °F (38 °C) 116 Abnormal  -- -- -- 148/64 88 mmHg 98 % -- --   03/24/23 2200 100 8 °F (38 2 °C) Abnormal  118 Abnormal  -- -- -- 134/64 86 mmHg 99 % -- --   03/24/23 2100 100 8 °F (38 2 °C) Abnormal  120 Abnormal  -- -- -- 140/60 84 mmHg 99 % -- --   03/24/23 2047 -- -- -- -- -- -- -- 99 % -- --   03/24/23 2000 100 8 °F (38 2 °C) Abnormal  124 Abnormal  -- 146/69 96 152/68 92 mmHg 99 % -- --   03/24/23 1935 -- -- -- -- -- -- -- 98 % -- --   03/24/23 1914 100 8 °F (38 2 °C) Abnormal  122 Abnormal  -- -- -- -- -- 99 % -- --   03/24/23 1900 100 4 °F (38 °C) 124 Abnormal  -- 124/69 90 -- -- 99 % -- --   03/24/23 1827 100 4 °F (38 °C) 124 Abnormal  -- 149/85 105 -- -- 98 % -- --   03/24/23 1800 100 °F (37 8 °C) 124 Abnormal  -- 156/75 107 -- -- 98 % -- --   03/24/23 1700 99 7 °F (37 6 °C) 120 Abnormal  -- 159/81 112 -- -- 99 % -- --   03/24/23 1634 99 3 °F (37 4 °C) 116 Abnormal  -- 146/74 109 -- -- 98 % -- --   03/24/23 1619 -- -- -- -- -- -- -- 98 % -- --   03/24/23 1600 99 °F (37 2 °C) 110 Abnormal  -- 190/101 Abnormal  134 -- -- 98 % -- -- 03/24/23 1532 99 °F (37 2 °C) 108 Abnormal  -- 198/94 Abnormal  134 -- -- 99 % -- --   03/24/23 1500 98 2 °F (36 8 °C) 106 Abnormal  -- 194/107 Abnormal  141 -- -- 100 % -- --   03/24/23 1400 96 1 °F (35 6 °C) Abnormal  96 -- 152/80 104 -- -- 100 % -- --   03/24/23 1345 -- -- -- -- -- -- -- 96 % -- --   03/24/23 1332 -- 96 -- -- -- -- -- 99 % -- --   03/24/23 1316 -- 98 -- 168/95 118 -- -- 98 % -- --     Pertinent Labs/Diagnostic Test Results:   XR chest portable   Final Result by Savanna Mason MD (03/25 1203)      Right PICC at cavoatrial junction  Left greater than right bibasilar opacity, atelectasis and/or pneumonia  Workstation performed: [id number]         XR chest portable   Final Result by Savanna Mason MD (03/25 3029)      NG tube below the diaphragm  Decreased patchy left opacity, question atelectasis versus pneumonia  Workstation performed: [id number]         X-ray chest 1 view   Final Result by Savanna Mason MD (03/25 3106)      Patchy opacity in the left lung  Given normal BNP, question pneumonia  Low endotracheal tube  Recommend retracting                 Workstation performed: [id number]         IR lower extremity angiogram    (Results Pending)         Results from last 7 days   Lab Units 03/25/23 0419 03/24/23  1543 03/24/23  1338   WBC Thousand/uL 13 76*  --  17 59*   HEMOGLOBIN g/dL 12 4  --  12 3   HEMATOCRIT % 35 0  --  34 6*   PLATELETS Thousands/uL 311 278 329   NEUTROS ABS Thousands/µL  --   --  12 34*   BANDS PCT % 4  --   --          Results from last 7 days   Lab Units 03/25/23 0419 03/24/23  1543 03/24/23  1338   SODIUM mmol/L 136  --  131*   POTASSIUM mmol/L 3 9  --  4 0   CHLORIDE mmol/L 109*  --  106   CO2 mmol/L 21  --  20*   ANION GAP mmol/L 6  --  5   BUN mg/dL 10  --  11   CREATININE mg/dL 0 58*  --  0 64   EGFR ml/min/1 73sq m 118  --  115   CALCIUM mg/dL 8 2*  --  7 9*   CALCIUM, IONIZED mmol/L  --  1 07*  -- MAGNESIUM mg/dL  --   --  1 9   PHOSPHORUS mg/dL  --   --  3 2     Results from last 7 days   Lab Units 03/25/23  0419 03/24/23  1338   AST U/L 19 23   ALT U/L 35 36   ALK PHOS U/L 109 114   TOTAL PROTEIN g/dL 6 5 6 4   ALBUMIN g/dL 2 7* 2 7*   TOTAL BILIRUBIN mg/dL 0 23 0 58     Results from last 7 days   Lab Units 03/25/23  1200 03/25/23  1001 03/25/23  0757 03/25/23  0535 03/25/23  0417 03/25/23  0203 03/25/23  0037 03/24/23  2216 03/24/23  1934 03/24/23  1736 03/24/23  0930   POC GLUCOSE mg/dl 168* 183* 198* 188* 198* 191* 200* 231* 344* 372* 265*     Results from last 7 days   Lab Units 03/25/23  0419 03/24/23  1338   GLUCOSE RANDOM mg/dL 211* 372*       BETA-HYDROXYBUTYRATE   Date Value Ref Range Status   05/15/2019 0 3 <0 6 mmol/L Final      Results from last 7 days   Lab Units 03/24/23  1411   PH ART  7 304*   PCO2 ART mm Hg 44 7*   PO2 ART mm Hg 113 7   HCO3 ART mmol/L 21 7*   BASE EXC ART mmol/L -4 6   O2 CONTENT ART mL/dL 17 8   O2 HGB, ARTERIAL % 95 2   ABG SOURCE  Radial, Left       Results from last 7 days   Lab Units 03/24/23  1543   HS TNI 0HR ng/L 5       Results from last 7 days   Lab Units 03/24/23  1543   PROTIME seconds 13 2  13 3   INR  0 99   PTT seconds 21*  21*       Results from last 7 days   Lab Units 03/24/23  1338   NT-PRO BNP pg/mL 12       Diet: Enteral- Parental tube feeding - Jevity  1 0 continuous  60ml /hr  100ml water q6 hrs    Mobility:  Bedrest - turn q 2     DVT Prophylaxis: SCD's to le's - Heparin gtt    Medications/Pain Control:   Scheduled Medications:  aspirin, 81 mg, Oral, Daily  atorvastatin, 40 mg, Oral, Daily With Dinner  chlorhexidine, 15 mL, Mouth/Throat, Q12H [facility] & snf  clopidogrel, 75 mg, Oral, Daily  gabapentin, 300 mg, Per G Tube, BID  heparin (porcine), 7,500 Units, Subcutaneous, Q8H ABDULKADIR  ipratropium, 0 5 mg, Nebulization, Q6H  levalbuterol, 1 25 mg, Nebulization, Q6H  methylPREDNISolone sodium succinate, 40 mg, Intravenous, Q6H [facility] & snf  nicotine, 21 mg, Transdermal, Daily  QUEtiapine, 200 mg, Oral, HS      Continuous IV Infusions:  fentaNYL, 150 mcg/hr, Intravenous, Continuous  insulin regular (HumuLIN R,NovoLIN R) infusion, 0 3-21 Units/hr, Intravenous, Titrated  ketamine, 0 25 mg/kg/hr, Intravenous, Continuous  propofol, 5-80 mcg/kg/min, Intravenous, Titrated      PRN Meds:  acetaminophen, 650 mg, Oral, Q4H PRN-3/24 x 1  albuterol, 2 puff, Inhalation, Q4H PRN  fentanyl citrate (PF), 100 mcg, Intravenous, Q2H PRN - 3/24 x 3   hydrALAZINE, 10 mg, Intravenous, Q6H PRN-3/24 x 1   labetalol, 20 mg, Intravenous, Q4H PRN-3/24 x 1   levalbuterol, 1 25 mg, Nebulization, Q4H PRN-3/25 x 1   midazolam, 2 mg, Intravenous, Q4H PRN-3/24 x 2 - 3/25 x 2   oxyCODONE, 10 mg, Oral, Q4H PRN  oxyCODONE, 5 mg, Oral, Q4H PRN        Network Utilization Review Department  ATTENTION: Please call with any questions or concerns to [phone] and carefully listen to the prompts so that you are directed to the right person  All voicemails are confidential   Catracho Delude all requests for admission clinical reviews, approved or denied determinations and any other requests to dedicated fax number below belonging to the campus where the patient is receiving treatment   List of dedicated fax numbers for the Facilities:  [address] DENIALS (Administrative/Medical Necessity) [phone]   [address] (Maternity/NICU/Pediatrics) [phone]   [address] [phone]   [facility] 77 [phone]   1306 [facility] [address] [place] [address] [address] 28 [phone]   [address] [facility] [place] [phone]   [facility] [phone]   [address] [place] [address]   111 [facility] [phone]

## 2023-03-25 NOTE — PROGRESS NOTES
Daily Progress Note - Critical Care   Santhosh Barnes 40 y o  female MRN: [id number]  [facility] En[facility]: [id number]        ----------------------------------------------------------------------------------------  HPI/24hr events: Santhosh Barnes is a 40 y o  female with PMH of DM-2, COPD, Morbid Obesity, PAD s/p LLE bypass and stenting 14/5542 complicated with stent thrombosis, presented initially for Left Leg Angiogram & possible redo bypass with Vascular Surgery (please refer to surgery Floydene Pilling notes for details,) the previous bypass was entirely occluded, with no outflow, was given 9000 units IV heparin during the procedure which was then reversed with Protamine, on extubation - per sign out by anesthesia - had severe bronchospasm requiring re-intubation  Brought to MICU for further eval and management, intubated and sedated on arrival     24 hrs: ET tube repositions, bronchospasm whenever sedation is lowered  Added ketamine drip, insulin drip   OG tube and A-line was placed  ---------------------------------------------------------------------------------------  SUBJECTIVE  Unable to provide secondary to intubation and sedation        ---------------------------------------------------------------------------------------  Assessment and Plan:    Neuro:   • Diagnosis: Sedated and intubated  • Patient having bronchospasm and bucking while    • Plan:  - Mechanical ventilator, on volume control, A/C   - Weaning and extubation protocol   - CAM-ICU   - On Propofol gtt currently at 80  - On ketamine gtt currently at 0 5  - Versed 2mg q4hr PRN      • Diagnosis: Pain control:   - On Fentanyl gtt 150 mcg/hr  - PRN Fentanyl for agitation/breakthrough pain  - Oxycodone 5-10mg q4hrs PRN      • Diagnosis: Everyday smoker, 1 pack a day  - Nicoderm 21 mg QD     • Diagnosis: Bipolar disorder 1  • Seroquel 200 mg qHS       CV:   • Diagnosis: Essential Hypertension   • Hypertensive Urgency   - Hx of HTN, listed home med Lisinopril 20 mg QD but not adherent with med  - Arrived to MICU on Epinephrine for reported bronchospasm, weaned off it   - PRN Labetalol 20 / Hydralazine 10 with parameters for SBP > 180 ; if not controlled considering Cardene drip    - continue monitor vitals    - OG tube placed, start oral antihypertensives      • Diagnosis: Peripheral Artery Disease   • S/p Left SFA - Popliteal bypass/stent, restenosis/occlusion   - Follows up vascular  - Here 03/24 for LLE Angiogram with possible intervention but found to have complete occlusion, heparin reversed, Protamine, and consideration for possible AKA   - Patient noncompliant with home xarelto, and plavix   · Plan  - Per vascular no indication for heparin gtt ; home Xarelto held   - SC Heparin 7500 units q8hr (DVT Prophylaxis)   - Aspirin 81 mg daily  - Atorvastatin 40 mg daily  - Plavix 75 mg daily  - Vascular surgery -- likely will require bilateral AKA        Pulm:  • Diagnosis: COPD, no PFT on file  • Currently everyday smoker 1 pack a day  • Acute Respiratory Failure with Hypoxia   • Intraoperative bronchospasm requiring reintubation 3/24     - Home medication include Advair twice daily and as needed albuterol ; not adherent    - PLAN:   - Intubated, on volume control , A/C 400/12/6/40%   - Respiratory protocol  - Duo-Neb Q6h + PRN Xopenex   - S/P Epinephrine   - Methylprednisolone 40 mg Q6h      GI:   • Diagnosis: GERD ; currently NPO   ? Plan: Protonix 40 mg IV QD   ? PO diet when appropriate         :   • Diagnosis: blum placed for accurate in/out and groin wound noted       - Blum care   - In and out         F/E/N:   ? NPO; intubated; considering tube feeding   ? Checking electrolytes and replete PRN   ? S/p 1 L LR ; IV hydration ; in / out ; daily weight   ?  PSEUDOHYPONATREMIA:   - Na (03/24) 131 ; corrected for Glucose (372) is 135      Heme/Onc:                   Recurrent stenosis/occlusion of LLE bypass/stent   - Was evaluated by hem/onc 09/2021 but did not follow up   - Home medicaitons including Aspirin and Xarelto; but per patient's spouse, the patient was not adherent with the medications "takes them sometimes"       - Vascular Surgery consulted Hematology with above concern of clotting at early age suspected clotting disorder, input appreciated  will need outpatient follow up for further evaluation and management  -PTT low (21), fibrinogen high (524)   -Thrombosis panel pending  -Heme/onc following  - See PAD / Cardiovascualr A&P above       Leukocytosis   - Leukocytosis likely reactive, post-op, afebrile , no Bandemia   - monitor vitals, trend temp and WBC, consider infection workup if concerning   - per note, has hx of MRSA infection 2021 and has R groin wound but does not look infected         Endo:   ? Diagnosis: Type 2 Diabetes Mellitus with hyperglycemia   - A1C 12/22 was 9 8   - Was not adherent with home meds, including Tulicity, Lantus 40 u QD and Metformin 1000 BID    - BG elevated 530 >> 265 >> 372>>344>>231>>200>>191   PLAN   - Insulin drip started  - Currently NPO, consider Low Carb diet       ID:   ? Diagnosis: No acute issue - see Leuykocytosis A&P above   ? Hx of MRSA infection   ? Has R groin wound, does not look infected         MSK/Skin:   Diagnosis: R groin wound , chronic               - Wound care               - blum in place      Patient appropriate for transfer out of the ICU today?: No  Disposition: Continue Critical Care   Code Status: Level 1 - Full Code  ---------------------------------------------------------------------------------------  ICU CORE MEASURES    Prophylaxis   VTE Pharmacologic Prophylaxis: Heparin  VTE Mechanical Prophylaxis: sequential compression device  Stress Ulcer Prophylaxis: Pantoprazole IV     ABCDE Protocol (if indicated)  Plan to perform spontaneous awakening trial today? Yes  Plan to perform spontaneous breathing trial today? Yes  Obvious barriers to extubation?  Yes  CAM-ICU: Negative    Invasive Devices Review  Invasive Devices     Peripheral Intravenous Line  Duration           Peripheral IV 23 Left;Proximal;Ventral (anterior) Forearm 1 day    Peripheral IV 23 Distal;Left;Ventral (anterior) Forearm <1 day    Peripheral IV 23 Right Antecubital <1 day          Arterial Line  Duration           Arterial Line 23 Radial <1 day          Drain  Duration           NG/OG/Enteral Tube Orogastric Center mouth 1 day    Urethral Catheter Temperature probe 1 day          Airway  Duration           ETT  Oral;Cuffed;Pre-curved; Inflated 7 mm <1 day              Can any invasive devices be discontinued today? No  ---------------------------------------------------------------------------------------  OBJECTIVE    Vitals   Vitals:    23 0400 23 0413 23 0500 23 0600   BP:       Pulse: (!) 108  (!) 112 (!) 110   Resp:       Temp: 99 3 °F (37 4 °C)  99 3 °F (37 4 °C) 99 °F (37 2 °C)   TempSrc:       SpO2: 97% 94% 97% 97%   Weight:       Height:         Temp (24hrs), Av 6 °F (37 6 °C), Min:96 1 °F (35 6 °C), Max:100 8 °F (38 2 °C)  Current: Temperature: 99 °F (37 2 °C)      Respiratory:  SpO2: SpO2: 97 %, SpO2 Activity: SpO2 Activity: At Rest, SpO2 Device: O2 Device: None (Room air), Capnography:         Invasive/non-invasive ventilation settings   Respiratory    Lab Data (Last 4 hours)    None         O2/Vent Data (Last 4 hours)      413           Vent Mode AC/VC       Resp Rate (BPM) (BPM) 14       Vt (mL) (mL) 380       FIO2 (%) (%) 40       PEEP (cmH2O) (cmH2O) 6       MV 7 87                   Physical Exam  Constitutional:       Appearance: She is obese  She is ill-appearing  Comments: Sedated and Intubated   HENT:      Head:      Comments: Intubation/OT tube in place and OG tube in place     Mouth/Throat:      Mouth: Mucous membranes are moist       Pharynx: Oropharynx is clear   No oropharyngeal exudate or posterior oropharyngeal erythema  Eyes:      Extraocular Movements: Extraocular movements intact  Conjunctiva/sclera: Conjunctivae normal       Pupils: Pupils are equal, round, and reactive to light  Comments: Pupils pinpoint     Cardiovascular:      Rate and Rhythm: Normal rate and regular rhythm  Pulses: Normal pulses  Heart sounds: Normal heart sounds  No murmur heard  No friction rub  No gallop  Pulmonary:      Effort: Pulmonary effort is normal  No respiratory distress  Breath sounds: Normal breath sounds  No stridor  No wheezing or rhonchi  Abdominal:      General: Abdomen is flat  Bowel sounds are normal       Palpations: Abdomen is soft  There is no mass  Tenderness: There is no abdominal tenderness  Skin:     General: Skin is warm and dry  Capillary Refill: Capillary refill takes less than 2 seconds  Coloration: Skin is not jaundiced or pale     Neurological:      Comments: Sedated and intubated, does not follow commands, or open eyes, limited neuro exam               Laboratory and Diagnostics:  Results from last 7 days   Lab Units 03/25/23  0419 03/24/23  1543 03/24/23  1338   WBC Thousand/uL 13 76*  --  17 59*   HEMOGLOBIN g/dL 12 4  --  12 3   HEMATOCRIT % 35 0  --  34 6*   PLATELETS Thousands/uL 311 278 329   NEUTROS PCT %  --   --  70   MONOS PCT %  --   --  7     Results from last 7 days   Lab Units 03/25/23  0419 03/24/23  1338   SODIUM mmol/L 136 131*   POTASSIUM mmol/L 3 9 4 0   CHLORIDE mmol/L 109* 106   CO2 mmol/L 21 20*   ANION GAP mmol/L 6 5   BUN mg/dL 10 11   CREATININE mg/dL 0 58* 0 64   CALCIUM mg/dL 8 2* 7 9*   GLUCOSE RANDOM mg/dL 211* 372*   ALT U/L 35 36   AST U/L 19 23   ALK PHOS U/L 109 114   ALBUMIN g/dL 2 7* 2 7*   TOTAL BILIRUBIN mg/dL 0 23 0 58     Results from last 7 days   Lab Units 03/24/23  1338   MAGNESIUM mg/dL 1 9   PHOSPHORUS mg/dL 3 2      Results from last 7 days   Lab Units 03/24/23  1543   INR  0 99   PTT seconds 21*  21* ABG:  Results from last 7 days   Lab Units 03/24/23  1411   PH ART  7 304*   PCO2 ART mm Hg 44 7*   PO2 ART mm Hg 113 7   HCO3 ART mmol/L 21 7*   BASE EXC ART mmol/L -4 6   ABG SOURCE  Radial, Left     VBG:  Results from last 7 days   Lab Units 03/24/23  1411   ABG SOURCE  Radial, Left           Micro        EKG:   Imaging:  I have personally reviewed pertinent reports  Intake and Output  I/O       03/23 0701 03/24 0700 03/24 0701 03/25 0700 03/25 0701 03/26 0700    I V  (mL/kg)  3069 5 (23 6)     NG/GT  60     Total Intake(mL/kg)  3129 5 (24 1)     Urine (mL/kg/hr)  2350     Emesis/NG output  100     Blood  30     Total Output  2480     Net  +649 5                    Height and Weights   Height: 5' 1" (154 9 cm)  IBW (Ideal Body Weight): 47 8 kg  Body mass index is 54 28 kg/m²  Weight (last 2 days)     Date/Time Weight    03/24/23 1300 130 (287 26)    03/24/23 1008 127 (280)            Nutrition       Diet Orders   (From admission, onward)             Start     Ordered    03/24/23 1326  Diet NPO  Diet effective now        References:    Nutrtion Support Algorithm Enteral vs  Parenteral   Question Answer Comment   Diet Type NPO    RD to adjust diet per protocol?  Yes        03/24/23 1329                  Active Medications  Scheduled Meds:  Current Facility-Administered Medications   Medication Dose Route Frequency Provider Last Rate   • acetaminophen  650 mg Oral Q4H PRN Susi Saleem MD     • albuterol  2 puff Inhalation Q4H PRN Danica Rodríguez MD     • aspirin  81 mg Oral Daily Dawna Madden MD     • atorvastatin  40 mg Oral Daily With Jolly Mathur MD     • chlorhexidine  15 mL Mouth/Throat Q12H [address] [facility], DO     • clopidogrel  75 mg Oral Daily Adams ENOCH Pike     • fentaNYL  150 mcg/hr Intravenous Continuous Susi Saleem  mcg/hr (03/25/23 0117)   • fentanyl citrate (PF)  100 mcg Intravenous Q2H PRN Susi Saleem MD     • heparin (porcine)  7,500 Units Subcutaneous Q8H [facility] Alyce Hansen MD     • hydrALAZINE  10 mg Intravenous Q6H PRN Powerabdirashid Hernandez, DO     • insulin regular (HumuLIN R,NovoLIN R) infusion  0 3-21 Units/hr Intravenous Titrated Fela Andrade MD 6 Units/hr (03/25/23 0536)   • ipratropium  0 5 mg Nebulization Q6H Elliot Perez MD     • ketamine  0 5 mg/kg/hr Intravenous Continuous Fela Andrade MD 0 5 mg/kg/hr (03/25/23 0228)   • labetalol  20 mg [facility]venous Q4H [facility]     • levalbuterol  1 25 mg Nebulization Q4H PRN [facility]     • levalbuterol  1 25 mg Nebulization Q6H Elliot Perez MD     • methylPREDNISolone sodium succinate  40 mg Intravenous Q6H [facility]     • midazolam  2 mg Intravenous Q4H PRN Fela Andrade MD     • nicotine  21 mg Transdermal Daily Power Aiberlin, DO     • oxyCODONE  10 mg Oral Q4H PRN Mason Smith MD     • oxyCODONE  5 mg Oral Q4H PRN Mason Smith MD     • pantoprazole  40 mg Intravenous Q24H [facility]     • propofol  5-80 mcg/kg/min Intravenous Titrated Thu Salinas MD 80 mcg/kg/min (03/25/23 0645)   • QUEtiapine  200 mg Oral HS Mason Smith MD       Continuous Infusions:  fentaNYL, 150 mcg/hr, Last Rate: 150 mcg/hr (03/25/23 0117)  insulin regular (HumuLIN R,NovoLIN R) infusion, 0 3-21 Units/hr, Last Rate: 6 Units/hr (03/25/23 0536)  ketamine, 0 5 mg/kg/hr, Last Rate: 0 5 mg/kg/hr (03/25/23 0228)  propofol, 5-80 mcg/kg/min, Last Rate: 80 mcg/kg/min (03/25/23 0645)      PRN Meds:   acetaminophen, 650 mg, Q4H PRN  albuterol, 2 puff, Q4H PRN  fentanyl citrate (PF), 100 mcg, Q2H PRN  hydrALAZINE, 10 mg, Q6H PRN  labetalol, 20 mg, Q4H PRN  levalbuterol, 1 25 mg, Q4H PRN  midazolam, 2 mg, Q4H PRN  oxyCODONE, 10 mg, Q4H PRN  oxyCODONE, 5 mg, Q4H PRN        Allergies   No Known Allergies  ---------------------------------------------------------------------------------------  Advance Directive and Living Will:      Power of :    POLST: ---------------------------------------------------------------------------------------  Care Time Delivered:   45 minutes    Fran Prado      Portions of the record may have been created with voice recognition software  Occasional wrong word or "sound a like" substitutions may have occurred due to the inherent limitations of voice recognition software    Read the chart carefully and recognize, using context, where substitutions have occurred

## 2023-03-25 NOTE — PROGRESS NOTES
Progress Note - Vascular Surgery   Deborra Magos 40 y o  female MRN: [id number]  Unit/Bed#: MICU 04 Encounter: [id number]    Assessment:  40 y o  F with history of obesity, DM, COPD, PAD with h/o  L SFA PTA/stent 8/11/2021, subsequent stent thrombectomy 8/25/2021 and ultimate  L CFA to BK-pop bypass with in situ GSV 9/14/21 with decreased NIKKI and elevated velocities in proximal anastomosis, now s/p LLE Agram which revealed and occluded left fem-BK pop bypass, no outflow into the foot  Plan:  Ongoing discussions regarding intervention moving forward, unfortunately will likely require a L AKA  Continue ASA/Plavix   Wean to extubate per ICU  Remainder of care per ICU     Subjective/Objective     Subjective: Patient is intubated and sedated  Objective:     Blood pressure 146/69, pulse (!) 108, temperature 99 °F (37 2 °C), resp  rate 22, height 5' 1" (1 549 m), weight 130 kg (287 lb 4 2 oz), SpO2 97 %, not currently breastfeeding  ,Body mass index is 54 28 kg/m²  Intake/Output Summary (Last 24 hours) at 3/25/2023 0753  Last data filed at 3/25/2023 0600  Gross per 24 hour   Intake 3129 49 ml   Output 2480 ml   Net 649 49 ml       Invasive Devices     Peripheral Intravenous Line  Duration           Peripheral IV 03/24/23 Left;Proximal;Ventral (anterior) Forearm 1 day    Peripheral IV 03/24/23 Distal;Left;Ventral (anterior) Forearm <1 day    Peripheral IV 03/24/23 Right Antecubital <1 day          Arterial Line  Duration           Arterial Line 03/24/23 Radial <1 day          Drain  Duration           NG/OG/Enteral Tube Orogastric Center mouth 1 day    Urethral Catheter Temperature probe 1 day          Airway  Duration           ETT  Oral;Cuffed;Pre-curved; Inflated 7 mm <1 day                Physical Exam:   NAD, sedated   Normocephalic, atraumatic  Moist mucous membranes   ETT in place   Regular rate  Abd soft, NT/ND  LLE absent signals in AT/DP, PT     Groin access site is c/d/i and soft   Skin is warm and dry      Lab, Imaging and other studies:  CBC:   Lab Results   Component Value Date    WBC 13 76 (H) 03/25/2023    HGB 12 4 03/25/2023    HCT 35 0 03/25/2023    MCV 74 (L) 03/25/2023     03/25/2023    MCH 26 2 (L) 03/25/2023    MCHC 35 4 03/25/2023    RDW 15 7 (H) 03/25/2023    MPV 10 4 03/25/2023    NRBC 0 03/24/2023   , CMP:   Lab Results   Component Value Date    SODIUM 136 03/25/2023    K 3 9 03/25/2023     (H) 03/25/2023    CO2 21 03/25/2023    BUN 10 03/25/2023    CREATININE 0 58 (L) 03/25/2023    CALCIUM 8 2 (L) 03/25/2023    AST 19 03/25/2023    ALT 35 03/25/2023    ALKPHOS 109 03/25/2023    EGFR 118 03/25/2023     VTE Pharmacologic Prophylaxis: Heparin  VTE Mechanical Prophylaxis: sequential compression device

## 2023-03-25 NOTE — PLAN OF CARE
Problem: MOBILITY - ADULT  Goal: Maintain or return to baseline ADL function  Description: INTERVENTIONS:  -  Assess patient's ability to carry out ADLs; assess patient's baseline for ADL function and identify physical deficits which impact ability to perform ADLs (bathing, care of mouth/teeth, toileting, grooming, dressing, etc )  - Assess/evaluate cause of self-care deficits   - Assess range of motion  - Assess patient's mobility; develop plan if impaired  - Assess patient's need for assistive devices and provide as appropriate  - Encourage maximum independence but intervene and supervise when necessary  - Involve family in performance of ADLs  - Assess for home care needs following discharge   - Consider OT consult to assist with ADL evaluation and planning for discharge  - Provide patient education as appropriate  Outcome: Progressing  Goal: Maintains/Returns to pre admission functional level  Description: INTERVENTIONS:  - Perform BMAT or MOVE assessment daily    - Set and communicate daily mobility goal to care team and patient/family/caregiver     - Collaborate with rehabilitation services on mobility goals if consulted  - Out of bed for toileting  - Record patient progress and toleration of activity level   Outcome: Progressing     Problem: SAFETY,RESTRAINT: NV/NON-SELF DESTRUCTIVE BEHAVIOR  Goal: Remains free of harm/injury (restraint for non violent/non self-detsructive behavior)  Description: INTERVENTIONS:  - Instruct patient/family regarding restraint use   - Assess and monitor physiologic and psychological status   - Provide interventions and comfort measures to meet assessed patient needs   - Identify and implement measures to help patient regain control  - Assess readiness for release of restraint   Outcome: Progressing  Goal: Returns to optimal restraint-free functioning  Description: INTERVENTIONS:  - Assess the patient's behavior and symptoms that indicate continued need for restraint  - Identify and implement measures to help patient regain control  - Assess readiness for release of restraint   Outcome: Progressing     Problem: Prexisting or High Potential for Compromised Skin Integrity  Goal: Skin integrity is maintained or improved  Description: INTERVENTIONS:  - Identify patients at risk for skin breakdown  - Assess and monitor skin integrity  - Assess and monitor nutrition and hydration status  - Monitor labs   - Assess for incontinence   - Turn and reposition patient  - Assist with mobility/ambulation  - Relieve pressure over bony prominences  - Avoid friction and shearing  - Provide appropriate hygiene as needed including keeping skin clean and dry  - Evaluate need for skin moisturizer/barrier cream  - Collaborate with interdisciplinary team   - Patient/family teaching  - Consider wound care consult   Outcome: Progressing

## 2023-03-25 NOTE — PROCEDURES
Insert PICC line    Date/Time: 3/25/2023 11:14 AM  Performed by: Basilio Mckeon RN  Authorized by: Asif Engel DO     Patient location:  Bedside  Other Assisting Provider: Yes (comment) Bernard LEIVA)    Consent:     Consent obtained:  Written (Nanci Moll obtained consent )    Consent given by:  Spouse    Procedural risks discussed: by MD Pinzon protocol:     Procedure explained and questions answered to patient or proxy's satisfaction: yes      Relevant documents present and verified: yes      Test results available and properly labeled: yes      Radiology Images displayed and confirmed  If images not available, report reviewed: yes      Required blood products, implants, devices, and special equipment available: yes      Site/side marked: yes      Immediately prior to procedure, a time out was called: yes      Patient identity confirmed:  Arm band and hospital-assigned identification number  Pre-procedure details:     Hand hygiene: Hand hygiene performed prior to insertion      Sterile barrier technique: All elements of maximal sterile technique followed      Skin preparation:  ChloraPrep    Skin preparation agent: Skin preparation agent completely dried prior to procedure    Indications:     PICC line indications: no peripheral vascular access    Anesthesia (see MAR for exact dosages):      Anesthesia method:  Local infiltration    Local anesthetic:  Lidocaine 1% w/o epi (2 ml)  Procedure details:     Location:  Basilic    Vessel type: vein      Laterality:  Right    Approach: percutaneous technique used      Patient position:  Flat    Procedural supplies:  Triple lumen    Catheter size:  5 Fr    Landmarks identified: yes      Ultrasound guidance: yes      Ultrasound image availability:  Not saved    Sterile ultrasound techniques: Sterile gel and sterile probe covers were used      Number of attempts:  1    Successful placement: yes      Vessel of catheter tip end:  Chest Xray needed to confirm placement    Total catheter length (cm):  51    Catheter out on skin (cm):  1    Max flow rate:  999ml/hr    Arm circumference:  40  Post-procedure details:     Post-procedure:  Securement device placed and dressing applied    Assessment:  Blood return through all ports    Post-procedure complications: none      Patient tolerance of procedure:   Tolerated well, no immediate complications  Comments:      LOT # [id number] 7-31-23

## 2023-03-25 NOTE — PROGRESS NOTES
Hematology - Oncology Progress Note    Katherine Ahn, 1985, [id number]  MICU 04/MICU 04      Impression and plan:    58-year-old female with complicated vascular history  Patient being seen/followed by vascular surgery  Discussions regarding left AKA are in process  Patient is on aspirin, Plavix and heparin  Multiple other acute issues including respiratory failure, hypertension, diabetes etc being addressed/treated by the ICU team   Recent CBC parameters are pretty good/acceptable; the decreased MCV and elevated RDW may be consistent with iron deficiency  If not already performed, iron studies can be drawn  Eventually patient will need to be placed back on oral anticoagulation (when she is much more stable)  Compliance is obviously crucial   As discussed in yesterday's consult note, thrombophilia evaluation is not absolutely needed at this time; some of the test results can be affected by the acuity of the patient's condition and by the anticoagulation medications  Will follow  __________________________________________________________________________________________    Chief complaint/reason for admission: Acute respiratory failure, intubation    History of present illness: 58-year-old female with complicated vascular history including femoral-pop arterial bypass, thrombectomy, prior anticoagulation  Not clear if patient was compliant with her anticoagulation medications  Patient is now in the ICU, intubated  Patient has complete obstruction, now on heparin      Hospital medications list:  Current Facility-Administered Medications   Medication Dose Route Frequency Provider Last Rate   • acetaminophen  650 mg Oral Q4H PRN May Duarte MD     • albuterol  2 puff Inhalation Q4H PRN Deangelo Amaya MD     • aspirin  81 mg Oral Daily Palmer Weinstein MD     • atorvastatin  40 mg Oral Daily With Yamileth Chen MD     • chlorhexidine  15 mL Mouth/Throat Q12H [id number], DO     • clopidogrel  75 mg Oral Daily Hanna Gurrola PA-C     • fentaNYL  150 mcg/hr Intravenous Continuous Kiki Liriano  mcg/hr (03/25/23 0818)   • fentanyl citrate (PF)  100 mcg Intravenous Q2H PRN Kiki Liriano MD     • gabapentin  300 mg Per G Tube BID Radha Marrufo DO     • heparin (porcine)  7,500 Units Subcutaneous [facility] Tani Armas MD     • hydrALAZINE  10 mg Intravenous Q6H PRN Power Hernandez DO     • insulin regular (HumuLIN R,NovoLIN R) infusion  0 3-21 Units/hr Intravenous Titrated Mitch Pa MD 6 Units/hr (03/25/23 1201)   • ipratropium  0 5 mg Nebulization Q6H Mitch Cross MD     • ketamine  0 25 mg/kg/hr Intravenous Continuous Mitch Pa MD 0 25 mg/kg/hr (03/25/23 1222)   • labetalol  20 mg Intravenous Q4H PRN Power Hernandez DO     • levalbuterol  1 25 mg Nebulization Q4H PRN Power Hernandez DO     • levalbuterol  1 25 mg Nebulization Q6H Mitch Cross MD     • methylPREDNISolone sodium succinate  40 mg Intravenous Q6H [facility] Power Hernandez DO     • midazolam  2 mg Intravenous Q4H PRN Mitch Pa MD     • nicotine  21 mg Transdermal Daily Power Hernandez DO     • oxyCODONE  10 mg Oral Q4H PRN Tani Armas MD     • oxyCODONE  5 mg Oral Q4H PRN Tani Armas MD     • propofol  5-80 mcg/kg/min Intravenous Titrated Kiki Liriano MD 80 mcg/kg/min (03/25/23 1250)   • QUEtiapine  200 mg Oral HS Tani Armas MD       Physical exam  /69   Pulse (!) 106   Temp 99 °F (37 2 °C)   Resp 14   Ht 5' 1" (1 549 m)   Wt 130 kg (287 lb 4 2 oz)   LMP  (LMP Unknown)   SpO2 97%   BMI 54 28 kg/m²   Respiratory: Intubated, coarse bilateral rhonchi  Cardiovascular: Normal rate, normal rhythm, no murmurs, no gallops, no rubs    GI: Soft, pes, decreased bowel sounds    Integument: ~Good color, warm, moist, scattered purpura, no petechia, no active bleeding  Lymphatic: No adenopathy in the neck, supra-clavicular region, axilla and groin bilaterally  Extremities: Left lower extremity is cool all the way up to the knee    Laboratory test results     Latest Reference Range & Units 03/25/23 04:19   WBC 4 31 - 10 16 Thousand/uL 13 76 (H)   Red Blood Cell Count 3 81 - 5 12 Million/uL 4 74   Hemoglobin 11 5 - 15 4 g/dL 12 4   HCT 34 8 - 46 1 % 35 0   MCV 82 - 98 fL 74 (L)   MCH 26 8 - 34 3 pg 26 2 (L)   MCHC 31 4 - 37 4 g/dL 35 4   RDW 11 6 - 15 1 % 15 7 (H)   Platelet Count 503 - 390 Thousands/uL 311

## 2023-03-26 VITALS
BODY MASS INDEX: 54.24 KG/M2 | DIASTOLIC BLOOD PRESSURE: 83 MMHG | WEIGHT: 287.26 LBS | HEIGHT: 61 IN | TEMPERATURE: 100 F | HEART RATE: 110 BPM | SYSTOLIC BLOOD PRESSURE: 174 MMHG | OXYGEN SATURATION: 98 % | RESPIRATION RATE: 19 BRPM

## 2023-03-26 LAB
ALBUMIN SERPL BCP-MCNC: 2.5 G/DL (ref 3.5–5)
ALP SERPL-CCNC: 95 U/L (ref 46–116)
ALT SERPL W P-5'-P-CCNC: 33 U/L (ref 12–78)
ANION GAP SERPL CALCULATED.3IONS-SCNC: 3 MMOL/L (ref 4–13)
AST SERPL W P-5'-P-CCNC: 49 U/L (ref 5–45)
BASOPHILS # BLD AUTO: 0.02 THOUSANDS/ÂΜL (ref 0–0.1)
BASOPHILS NFR BLD AUTO: 0 % (ref 0–1)
BILIRUB SERPL-MCNC: 0.22 MG/DL (ref 0.2–1)
BUN SERPL-MCNC: 10 MG/DL (ref 5–25)
CALCIUM ALBUM COR SERPL-MCNC: 9 MG/DL (ref 8.3–10.1)
CALCIUM SERPL-MCNC: 7.8 MG/DL (ref 8.3–10.1)
CHLORIDE SERPL-SCNC: 111 MMOL/L (ref 96–108)
CO2 SERPL-SCNC: 24 MMOL/L (ref 21–32)
CREAT SERPL-MCNC: 0.54 MG/DL (ref 0.6–1.3)
EOSINOPHIL # BLD AUTO: 0 THOUSAND/ÂΜL (ref 0–0.61)
EOSINOPHIL NFR BLD AUTO: 0 % (ref 0–6)
ERYTHROCYTE [DISTWIDTH] IN BLOOD BY AUTOMATED COUNT: 16.2 % (ref 11.6–15.1)
GFR SERPL CREATININE-BSD FRML MDRD: 121 ML/MIN/1.73SQ M
GLUCOSE SERPL-MCNC: 113 MG/DL (ref 65–140)
GLUCOSE SERPL-MCNC: 117 MG/DL (ref 65–140)
GLUCOSE SERPL-MCNC: 118 MG/DL (ref 65–140)
GLUCOSE SERPL-MCNC: 140 MG/DL (ref 65–140)
GLUCOSE SERPL-MCNC: 173 MG/DL (ref 65–140)
GLUCOSE SERPL-MCNC: 176 MG/DL (ref 65–140)
GLUCOSE SERPL-MCNC: 190 MG/DL (ref 65–140)
GLUCOSE SERPL-MCNC: 197 MG/DL (ref 65–140)
GLUCOSE SERPL-MCNC: 199 MG/DL (ref 65–140)
GLUCOSE SERPL-MCNC: 200 MG/DL (ref 65–140)
GLUCOSE SERPL-MCNC: 207 MG/DL (ref 65–140)
GLUCOSE SERPL-MCNC: 213 MG/DL (ref 65–140)
GLUCOSE SERPL-MCNC: 216 MG/DL (ref 65–140)
GLUCOSE SERPL-MCNC: 228 MG/DL (ref 65–140)
GLUCOSE SERPL-MCNC: 243 MG/DL (ref 65–140)
GLUCOSE SERPL-MCNC: 69 MG/DL (ref 65–140)
GLUCOSE SERPL-MCNC: 87 MG/DL (ref 65–140)
HCT VFR BLD AUTO: 33.3 % (ref 34.8–46.1)
HGB BLD-MCNC: 11.5 G/DL (ref 11.5–15.4)
IMM GRANULOCYTES # BLD AUTO: 0.21 THOUSAND/UL (ref 0–0.2)
IMM GRANULOCYTES NFR BLD AUTO: 1 % (ref 0–2)
LYMPHOCYTES # BLD AUTO: 0.88 THOUSANDS/ÂΜL (ref 0.6–4.47)
LYMPHOCYTES NFR BLD AUTO: 5 % (ref 14–44)
MAGNESIUM SERPL-MCNC: 2.5 MG/DL (ref 1.6–2.6)
MCH RBC QN AUTO: 25.8 PG (ref 26.8–34.3)
MCHC RBC AUTO-ENTMCNC: 34.5 G/DL (ref 31.4–37.4)
MCV RBC AUTO: 75 FL (ref 82–98)
MONOCYTES # BLD AUTO: 0.97 THOUSAND/ÂΜL (ref 0.17–1.22)
MONOCYTES NFR BLD AUTO: 6 % (ref 4–12)
NEUTROPHILS # BLD AUTO: 15.2 THOUSANDS/ÂΜL (ref 1.85–7.62)
NEUTS SEG NFR BLD AUTO: 88 % (ref 43–75)
NRBC BLD AUTO-RTO: 0 /100 WBCS
PHOSPHATE SERPL-MCNC: 3.2 MG/DL (ref 2.7–4.5)
PLATELET # BLD AUTO: 278 THOUSANDS/UL (ref 149–390)
PMV BLD AUTO: 11.1 FL (ref 8.9–12.7)
POTASSIUM SERPL-SCNC: 4.3 MMOL/L (ref 3.5–5.3)
PROT C AG ACT/NOR PPP IA: 63 % OF NORMAL (ref 60–150)
PROT S ACT/NOR PPP: 58 % (ref 68–108)
PROT SERPL-MCNC: 6.2 G/DL (ref 6.4–8.4)
RBC # BLD AUTO: 4.45 MILLION/UL (ref 3.81–5.12)
SODIUM SERPL-SCNC: 138 MMOL/L (ref 135–147)
WBC # BLD AUTO: 17.28 THOUSAND/UL (ref 4.31–10.16)

## 2023-03-26 RX ORDER — HYDROMORPHONE HCL 110MG/55ML
2 PATIENT CONTROLLED ANALGESIA SYRINGE INTRAVENOUS EVERY 4 HOURS PRN
Status: DISCONTINUED | OUTPATIENT
Start: 2023-03-26 | End: 2023-03-27

## 2023-03-26 RX ORDER — PANTOPRAZOLE SODIUM 40 MG/1
40 TABLET, DELAYED RELEASE ORAL
Status: DISCONTINUED | OUTPATIENT
Start: 2023-03-26 | End: 2023-03-26

## 2023-03-26 RX ORDER — METHYLPREDNISOLONE SODIUM SUCCINATE 40 MG/ML
40 INJECTION, POWDER, LYOPHILIZED, FOR SOLUTION INTRAMUSCULAR; INTRAVENOUS EVERY 12 HOURS SCHEDULED
Status: DISCONTINUED | OUTPATIENT
Start: 2023-03-26 | End: 2023-03-27

## 2023-03-26 RX ORDER — GABAPENTIN 250 MG/5ML
300 SOLUTION ORAL 2 TIMES DAILY
Status: DISCONTINUED | OUTPATIENT
Start: 2023-03-26 | End: 2023-03-30

## 2023-03-26 RX ORDER — INSULIN GLARGINE 100 [IU]/ML
20 INJECTION, SOLUTION SUBCUTANEOUS EVERY 12 HOURS SCHEDULED
Status: DISCONTINUED | OUTPATIENT
Start: 2023-03-26 | End: 2023-03-28

## 2023-03-26 RX ORDER — ALBUTEROL SULFATE 2.5 MG/3ML
2.5 SOLUTION RESPIRATORY (INHALATION) ONCE
Status: COMPLETED | OUTPATIENT
Start: 2023-03-26 | End: 2023-03-26

## 2023-03-26 RX ORDER — DEXMEDETOMIDINE HYDROCHLORIDE 4 UG/ML
.1-.7 INJECTION, SOLUTION INTRAVENOUS
Status: DISCONTINUED | OUTPATIENT
Start: 2023-03-26 | End: 2023-03-27

## 2023-03-26 RX ADMIN — LEVALBUTEROL HYDROCHLORIDE 1.25 MG: 1.25 SOLUTION, CONCENTRATE RESPIRATORY (INHALATION) at 00:33

## 2023-03-26 RX ADMIN — CHLORHEXIDINE GLUCONATE 0.12% ORAL RINSE 15 ML: 1.2 LIQUID ORAL at 21:00

## 2023-03-26 RX ADMIN — DEXMEDETOMIDINE HYDROCHLORIDE 0.3 MCG/KG/HR: 400 INJECTION INTRAVENOUS at 08:29

## 2023-03-26 RX ADMIN — SODIUM CHLORIDE 15 UNITS/HR: 9 INJECTION, SOLUTION INTRAVENOUS at 17:02

## 2023-03-26 RX ADMIN — INSULIN GLARGINE 20 UNITS: 100 INJECTION, SOLUTION SUBCUTANEOUS at 11:06

## 2023-03-26 RX ADMIN — ACETAMINOPHEN 650 MG: 325 TABLET ORAL at 23:50

## 2023-03-26 RX ADMIN — METHYLPREDNISOLONE SODIUM SUCCINATE 40 MG: 40 INJECTION, POWDER, FOR SOLUTION INTRAMUSCULAR; INTRAVENOUS at 17:38

## 2023-03-26 RX ADMIN — ASPIRIN 81 MG CHEWABLE TABLET 81 MG: 81 TABLET CHEWABLE at 08:06

## 2023-03-26 RX ADMIN — OXYCODONE HYDROCHLORIDE 10 MG: 10 TABLET ORAL at 15:33

## 2023-03-26 RX ADMIN — IPRATROPIUM BROMIDE 0.5 MG: 0.5 SOLUTION RESPIRATORY (INHALATION) at 15:15

## 2023-03-26 RX ADMIN — QUETIAPINE FUMARATE 200 MG: 100 TABLET ORAL at 22:50

## 2023-03-26 RX ADMIN — LEVALBUTEROL HYDROCHLORIDE 1.25 MG: 1.25 SOLUTION, CONCENTRATE RESPIRATORY (INHALATION) at 19:39

## 2023-03-26 RX ADMIN — PROPOFOL 50 MCG/KG/MIN: 10 INJECTION, EMULSION INTRAVENOUS at 01:55

## 2023-03-26 RX ADMIN — NICOTINE 21 MG: 21 PATCH, EXTENDED RELEASE TRANSDERMAL at 08:06

## 2023-03-26 RX ADMIN — LABETALOL HYDROCHLORIDE 20 MG: 5 INJECTION, SOLUTION INTRAVENOUS at 08:33

## 2023-03-26 RX ADMIN — OXYCODONE HYDROCHLORIDE 10 MG: 10 TABLET ORAL at 11:37

## 2023-03-26 RX ADMIN — HEPARIN SODIUM 7500 UNITS: 5000 INJECTION INTRAVENOUS; SUBCUTANEOUS at 22:49

## 2023-03-26 RX ADMIN — Medication 150 MCG/HR: at 05:27

## 2023-03-26 RX ADMIN — OXYCODONE HYDROCHLORIDE 10 MG: 10 TABLET ORAL at 23:48

## 2023-03-26 RX ADMIN — KETAMINE HYDROCHLORIDE 0.25 MG/KG/HR: 100 INJECTION INTRAMUSCULAR; INTRAVENOUS at 04:42

## 2023-03-26 RX ADMIN — IPRATROPIUM BROMIDE 0.5 MG: 0.5 SOLUTION RESPIRATORY (INHALATION) at 07:36

## 2023-03-26 RX ADMIN — METHYLPREDNISOLONE SODIUM SUCCINATE 40 MG: 40 INJECTION, POWDER, FOR SOLUTION INTRAMUSCULAR; INTRAVENOUS at 05:03

## 2023-03-26 RX ADMIN — LEVALBUTEROL HYDROCHLORIDE 1.25 MG: 1.25 SOLUTION, CONCENTRATE RESPIRATORY (INHALATION) at 07:36

## 2023-03-26 RX ADMIN — HEPARIN SODIUM 7500 UNITS: 5000 INJECTION INTRAVENOUS; SUBCUTANEOUS at 05:03

## 2023-03-26 RX ADMIN — HEPARIN SODIUM 7500 UNITS: 5000 INJECTION INTRAVENOUS; SUBCUTANEOUS at 13:14

## 2023-03-26 RX ADMIN — HYDROMORPHONE HYDROCHLORIDE 2 MG: 2 INJECTION, SOLUTION INTRAMUSCULAR; INTRAVENOUS; SUBCUTANEOUS at 17:37

## 2023-03-26 RX ADMIN — GABAPENTIN 300 MG: 250 SOLUTION ORAL at 08:06

## 2023-03-26 RX ADMIN — NOREPINEPHRINE BITARTRATE 1 MCG/MIN: 1 INJECTION INTRAVENOUS at 03:53

## 2023-03-26 RX ADMIN — LEVALBUTEROL HYDROCHLORIDE 1.25 MG: 1.25 SOLUTION, CONCENTRATE RESPIRATORY (INHALATION) at 15:15

## 2023-03-26 RX ADMIN — INSULIN GLARGINE 20 UNITS: 100 INJECTION, SOLUTION SUBCUTANEOUS at 20:00

## 2023-03-26 RX ADMIN — PROPOFOL 50 MCG/KG/MIN: 10 INJECTION, EMULSION INTRAVENOUS at 04:39

## 2023-03-26 RX ADMIN — SODIUM CHLORIDE 3 UNITS/HR: 9 INJECTION, SOLUTION INTRAVENOUS at 04:30

## 2023-03-26 RX ADMIN — OXYCODONE HYDROCHLORIDE 10 MG: 10 TABLET ORAL at 19:50

## 2023-03-26 RX ADMIN — MIDAZOLAM 2 MG: 1 INJECTION INTRAMUSCULAR; INTRAVENOUS at 08:19

## 2023-03-26 RX ADMIN — FENTANYL CITRATE 100 MCG: 50 INJECTION INTRAMUSCULAR; INTRAVENOUS at 04:20

## 2023-03-26 RX ADMIN — ALBUTEROL SULFATE 2.5 MG: 2.5 SOLUTION RESPIRATORY (INHALATION) at 09:47

## 2023-03-26 RX ADMIN — PANTOPRAZOLE SODIUM 40 MG: 40 TABLET, DELAYED RELEASE ORAL at 05:03

## 2023-03-26 RX ADMIN — CHLORHEXIDINE GLUCONATE 0.12% ORAL RINSE 15 ML: 1.2 LIQUID ORAL at 08:06

## 2023-03-26 RX ADMIN — CLOPIDOGREL BISULFATE 75 MG: 75 TABLET ORAL at 08:06

## 2023-03-26 RX ADMIN — IPRATROPIUM BROMIDE 0.5 MG: 0.5 SOLUTION RESPIRATORY (INHALATION) at 00:33

## 2023-03-26 RX ADMIN — ACETAMINOPHEN 650 MG: 325 TABLET ORAL at 12:57

## 2023-03-26 RX ADMIN — ATORVASTATIN CALCIUM 40 MG: 40 TABLET, FILM COATED ORAL at 15:33

## 2023-03-26 RX ADMIN — PROPOFOL 50 MCG/KG/MIN: 10 INJECTION, EMULSION INTRAVENOUS at 07:15

## 2023-03-26 RX ADMIN — IPRATROPIUM BROMIDE 0.5 MG: 0.5 SOLUTION RESPIRATORY (INHALATION) at 19:39

## 2023-03-26 RX ADMIN — GABAPENTIN 300 MG: 250 SOLUTION ORAL at 17:38

## 2023-03-26 NOTE — PROGRESS NOTES
[facility]  Critical Care Progress Note  Date of Service: 3/26/2023  Hospital Day: 2  Name: Omayar Tse  MRN: [id number]  [facility]    Assessment/Plan   Neuro:   · Diagnosis: Sedation/analgesia  · Plan:   · Propofol 50, fentanyl 150, ketamine 0 25  · Wean as tolerated  · Versed 2 mg Q4 prn  · Oxycodone and fentanyl prn  · Continue gabapentin 300 mg BID  · Diagnosis: Bipolar disorder  · Plan:  · Continue home seroquel 200 mg qHS      CV:   · Diagnosis: Hypotension, likely related to sedation  · Plan:   · Wean sedation as tolerated  · Levo as needed to maintain MAP>65  · Diagnosis: Peripheral artery disease s/p Left fem-BK pop bypass/stent with restenosis and occlusion   · Plan:   · Vascular following  · S/p angiogram on 3/24 which showed complete occlusion of left fem-pop bypass graft  · Patient will likely need amputation  · Continue asa and plavix and statin      Pulm:  · Diagnosis: COPD, acute exacerbation requiring re-intubation after angogram  · Plan:   · Continue nebs  · Continue solumedrol 40 mg Q6  · Continue mechanical ventilation, attempt to wean sedation as tolerated  · Diagnosis: tobacco use  · Plan:  · Nicotine patch daily      GI:   · Diagnosis: GERD  · Plan:   · Continue home protonix 40 mg daily      :   No active issues    F/E/N:   · F: none  · E: monitor, replete for goal K>4, Mag>2  · N: continue tube feeds    Heme/Onc:   · Diagnosis: Recurrent stenosis/occlusion of LLE bypass graft and stent  · Plan:   · Heme onc consulted, appreciate recs  · Will need heme onc follow up as outpatient  · Will eventually need to be placed back on anticoagulation      Endo:   · Diagnosis: Type 2 Diabetes  · Plan:   · Continue insulin gtt  · Currently well controlled, consider transitioning to SQ insulin  · Goal -180      ID:   No active issues    MSK/Skin:   No active issues    Disposition: Critical care    ICU Core Measures     Vented Patient  VAP Bundle  VAP bundle ordered     A: Assess, Prevent, and Manage Pain · Has pain been assessed? NA  · Need for changes to pain regimen? NA   B: Both Spontaneous Awakening Trials (SATs) and Spontaneous Breathing Trials (SBTs) · Plan to perform spontaneous awakening trial today? Yes   · Plan to perform spontaneous breathing trial today? Yes   · Obvious barriers to extubation? No   C: Choice of Sedation · RASS Goal: -1 Drowsy or 0 Alert and Calm  · Need for changes to sedation or analgesia regimen? No   D: Delirium · CAM-ICU: Negative   E: Early Mobility  · Plan for early mobility? NA   F: Family Engagement · Plan for family engagement today? Yes       Review of Invasive Devices: Gustavo Plan: Continue for accurate I/O monitoring for 48 hours  Central access plan: PICC in place  Marizol Plan: Discontinue arterial line    Prophylaxis:  VTE VTE covered by:  heparin (porcine), Subcutaneous, 7,500 Units at 03/25/23 2121       Stress Ulcer  not ordered          Subjective   HPI/24hr events:     HPI:  40 y o  female with PMH of DM-2, COPD, Morbid Obesity, PAD s/p LLE bypass and stenting 57/4586 complicated with stent thrombosis, presented initially for Left Leg Angiogram & possible redo bypass with Vascular Surgery (please refer to surgery Saundra Christy notes for details,) the previous bypass was entirely occluded, with no outflow, was given 9000 units IV heparin during the procedure which was then reversed with Protamine, on extubation - per sign out by anesthesia - had severe bronchospasm requiring re-intubation  Brought to MICU for further eval and management, intubated and sedated on arrival     24 hour events:  Propofol weaned to 50       Review of Systems   Unable to perform ROS: Intubated          Objective                            Vitals I/O      Most Recent Min/Max in 24hrs   Temp 98 2 °F (36 8 °C) Temp  Min: 98 2 °F (36 8 °C)  Max: 99 3 °F (37 4 °C)   Pulse 92 Pulse  Min: 92  Max: 112   Resp 15 Resp  Min: 14  Max: 15 BP (!) 81/47 BP  Min: 81/47  Max: 102/51   O2 Sat 97 % SpO2  Min: 94 %  Max: 98 %      Intake/Output Summary (Last 24 hours) at 3/26/2023 0352  Last data filed at 3/26/2023 0228  Gross per 24 hour   Intake 2502 33 ml   Output 1790 ml   Net 712 33 ml         Diet Enteral/Parenteral; Tube Feeding No Oral Diet; Jevity 1 0; Continuous; 60; 100; Water; Every 6 hours     Invasive Monitoring Physical exam    Physical Exam  Constitutional:       Appearance: She is obese  Comments: Sedated  HENT:      Head: Normocephalic and atraumatic  Right Ear: External ear normal       Left Ear: External ear normal       Nose: Nose normal       Mouth/Throat:      Comments: ET in place  Cardiovascular:      Rate and Rhythm: Normal rate and regular rhythm  Pulses: Normal pulses  Heart sounds: Normal heart sounds  No murmur heard  No friction rub  No gallop  Pulmonary:      Breath sounds: Normal breath sounds  Comments: Mechanically ventilated  Abdominal:      General: Abdomen is flat  There is no distension  Tenderness: There is no abdominal tenderness  There is no guarding or rebound  Musculoskeletal:      Cervical back: Neck supple  Right lower leg: No edema  Left lower leg: No edema  Skin:     General: Skin is warm and dry  Comments: Left lower extremity cool  Neurological:      Comments: Sedated on propofol             Diagnostic Studies      EKG: no new EKG  Imaging:  no new imaging     Medications:  Scheduled PRN   aspirin, 81 mg, Daily  atorvastatin, 40 mg, Daily With Dinner  chlorhexidine, 15 mL, Q12H [address]  clopidogrel, 75 mg, Daily  gabapentin, 300 mg, BID  heparin (porcine), 7,500 Units, Q8H ABDULKADIR  ipratropium, 0 5 mg, Q6H  levalbuterol, 1 25 mg, Q6H  methylPREDNISolone sodium succinate, 40 mg, Q6H ABDULKADIR  nicotine, 21 mg, Daily  QUEtiapine, 200 mg, HS      acetaminophen, 650 mg, Q4H PRN  albuterol, 2 puff, Q4H PRN  fentanyl citrate (PF), 100 mcg, Q2H PRN  hydrALAZINE, 10 mg, Q6H PRN  labetalol, 20 mg, Q4H PRN  levalbuterol, 1 25 mg, Q4H PRN  midazolam, 2 mg, Q4H PRN  oxyCODONE, 10 mg, Q4H PRN  oxyCODONE, 5 mg, Q4H PRN       Continuous    fentaNYL, 150 mcg/hr, Last Rate: 150 mcg/hr (03/25/23 2224)  insulin regular (HumuLIN R,NovoLIN R) infusion, 0 3-21 Units/hr, Last Rate: 6 Units/hr (03/26/23 0228)  ketamine, 0 25 mg/kg/hr, Last Rate: 0 25 mg/kg/hr (03/25/23 1222)  norepinephrine, 1-30 mcg/min  propofol, 5-80 mcg/kg/min, Last Rate: 50 mcg/kg/min (03/26/23 0155)         Labs:    CBC    Recent Labs     03/24/23  1338 03/24/23  1543 03/25/23  0419   WBC 17 59*  --  13 76*   HGB 12 3  --  12 4   HCT 34 6*  --  35 0    278 311   BANDSPCT  --   --  4     BMP    Recent Labs     03/24/23  1338 03/25/23  0419   SODIUM 131* 136   K 4 0 3 9    109*   CO2 20* 21   AGAP 5 6   BUN 11 10   CREATININE 0 64 0 58*   CALCIUM 7 9* 8 2*       Coags    Recent Labs     03/24/23  1543   INR 0 99   PTT 21*  21*        Additional Electrolytes  Recent Labs     03/24/23  1338 03/24/23  1543   MG 1 9  --    PHOS 3 2  --    CAIONIZED  --  1 07*          Blood Gas    Recent Labs     03/24/23  1411   PHART 7 304*   [id number] 44 7*   PO2ART 113 7   [id number] 21 7*   BEART -4 6   SOURCE Radial, Left     Recent Labs     03/24/23  1411   SOURCE Radial, Left    LFTs  Recent Labs     03/24/23  1338 03/25/23  0419   ALT 36 35   AST 23 19   ALKPHOS 114 109   ALB 2 7* 2 7*   TBILI 0 58 0 23       Infectious  No recent results  Glucose  Recent Labs     03/24/23  1338 03/25/23  0419   GLUC 372* 211*               Critical Care Time Delivered: Upon my evaluation, this patient had a high probability of imminent or life-threatening deterioration due to acute respiratory failure, which required my direct attention, intervention, and personal management    I have personally provided 30 minutes of critical care time, exclusive of procedures, teaching, family meetings, and any prior time recorded by providers other than myself       Henny Wray MD

## 2023-03-26 NOTE — CONSULTS
Consult received for Tube Feeding recommendations  Recommend adjust EN to Glucerna 1 2 (higher protein, lower CHO) with goal 45 ml/hour while on current Propofol rate, and add 3 packets Prosource liquid protein daily, for 1476 calories (2268 with Propofol), 109 grams protein, 123 grams CHO, 65 grams fat (137 grams with Propofol), 1269 ml free water with current flushes, 1480 ml total volume with flushes  Adjust free water flushes as needed per Critical Care  RD will reassess goal EN pending Propofol needs

## 2023-03-26 NOTE — PROGRESS NOTES
Progress Note - Vascular Surgery   Benja Rodriguez 40 y o  female MRN: [id number]  Unit/Bed#: MICU 04 Encounter: [id number]    Assessment:  40 y o  F with history of obesity, DM, COPD, PAD with h/o  L SFA PTA/stent 8/11/2021, subsequent stent thrombectomy 8/25/2021 and ultimate  L CFA to BK-pop bypass with in situ GSV 9/14/21 with decreased NIKKI and elevated velocities in proximal anastomosis, now s/p LLE Agram which revealed an occluded left fem-BK pop bypass, no outflow into the foot  Patient remains intubated with plan to slowly wean sedation and extubate with anesthesia present within the coming days  Plan:  Ongoing discussions regarding intervention moving forward, unfortunately will likely require a L AKA  Will discuss this option with the patient once she is extubated  Continue ASA/Plavix   Wean to extubate per ICU  Remainder of care per ICU     Subjective/Objective     Subjective: Patient is intubated and sedated  Objective:     Blood pressure (!) 92/46, pulse 90, temperature 98 6 °F (37 °C), resp  rate 15, height 5' 1" (1 549 m), weight 130 kg (287 lb 4 2 oz), SpO2 97 %, not currently breastfeeding  ,Body mass index is 54 28 kg/m²        Intake/Output Summary (Last 24 hours) at 3/26/2023 0732  Last data filed at 3/26/2023 0600  Gross per 24 hour   Intake 2880 47 ml   Output 1705 ml   Net 1175 47 ml       Invasive Devices     Peripherally Inserted Central Catheter Line  Duration           PICC Line 84/02/20 Right Basilic <1 day          Peripheral Intravenous Line  Duration           Peripheral IV 03/24/23 Left;Proximal;Ventral (anterior) Forearm 2 days    Peripheral IV 03/24/23 Distal;Left;Ventral (anterior) Forearm 1 day    Peripheral IV 03/24/23 Right Antecubital 1 day          Arterial Line  Duration           Arterial Line 03/24/23 Radial 1 day          Drain  Duration           Urethral Catheter Temperature probe 2 days    NG/OG/Enteral Tube Orogastric 18 Fr Center mouth <1 day Airway  Duration           ETT  Oral;Cuffed;Pre-curved; Inflated 7 mm 1 day                Physical Exam:   NAD, sedated  Normocephalic, atraumatic  Moist mucous membranes   ETT in place   Regular rate  Abd soft, NT/ND  LLE absent signals in AT/DP/PT  R groin access site is c/d/i   Skin is warm and dry        Lab, Imaging and other studies:  CBC:   Lab Results   Component Value Date    WBC 17 28 (H) 03/26/2023    HGB 11 5 03/26/2023    HCT 33 3 (L) 03/26/2023    MCV 75 (L) 03/26/2023     03/26/2023    MCH 25 8 (L) 03/26/2023    MCHC 34 5 03/26/2023    RDW 16 2 (H) 03/26/2023    MPV 11 1 03/26/2023    NRBC 0 03/26/2023   , CMP:   Lab Results   Component Value Date    SODIUM 138 03/26/2023    K 4 3 03/26/2023     (H) 03/26/2023    CO2 24 03/26/2023    BUN 10 03/26/2023    CREATININE 0 54 (L) 03/26/2023    CALCIUM 7 8 (L) 03/26/2023    AST 49 (H) 03/26/2023    ALT 33 03/26/2023    ALKPHOS 95 03/26/2023    EGFR 121 03/26/2023     VTE Pharmacologic Prophylaxis: Heparin  VTE Mechanical Prophylaxis: sequential compression device

## 2023-03-27 ENCOUNTER — APPOINTMENT (INPATIENT)
Dept: NON INVASIVE DIAGNOSTICS | Facility: HOSPITAL | Age: 38
End: 2023-03-27

## 2023-03-27 ENCOUNTER — ANESTHESIA (INPATIENT)
Dept: ANESTHESIOLOGY | Facility: HOSPITAL | Age: 38
End: 2023-03-27

## 2023-03-27 ENCOUNTER — ANESTHESIA EVENT (INPATIENT)
Dept: ANESTHESIOLOGY | Facility: HOSPITAL | Age: 38
End: 2023-03-27

## 2023-03-27 LAB
ALBUMIN SERPL BCP-MCNC: 2.5 G/DL (ref 3.5–5)
ALP SERPL-CCNC: 98 U/L (ref 46–116)
ALT SERPL W P-5'-P-CCNC: 58 U/L (ref 12–78)
ANION GAP SERPL CALCULATED.3IONS-SCNC: 3 MMOL/L (ref 4–13)
APTT PPP: 26 SECONDS (ref 23–37)
AST SERPL W P-5'-P-CCNC: 204 U/L (ref 5–45)
ATRIAL RATE: 108 BPM
BASOPHILS # BLD AUTO: 0.04 THOUSANDS/ÂΜL (ref 0–0.1)
BASOPHILS NFR BLD AUTO: 0 % (ref 0–1)
BILIRUB SERPL-MCNC: 0.24 MG/DL (ref 0.2–1)
BUN SERPL-MCNC: 16 MG/DL (ref 5–25)
CALCIUM ALBUM COR SERPL-MCNC: 9 MG/DL (ref 8.3–10.1)
CALCIUM SERPL-MCNC: 7.8 MG/DL (ref 8.3–10.1)
CHLORIDE SERPL-SCNC: 108 MMOL/L (ref 96–108)
CO2 SERPL-SCNC: 25 MMOL/L (ref 21–32)
CREAT SERPL-MCNC: 0.58 MG/DL (ref 0.6–1.3)
EOSINOPHIL # BLD AUTO: 0 THOUSAND/ÂΜL (ref 0–0.61)
EOSINOPHIL NFR BLD AUTO: 0 % (ref 0–6)
ERYTHROCYTE [DISTWIDTH] IN BLOOD BY AUTOMATED COUNT: 15.9 % (ref 11.6–15.1)
GFR SERPL CREATININE-BSD FRML MDRD: 118 ML/MIN/1.73SQ M
GLUCOSE SERPL-MCNC: 126 MG/DL (ref 65–140)
GLUCOSE SERPL-MCNC: 167 MG/DL (ref 65–140)
GLUCOSE SERPL-MCNC: 219 MG/DL (ref 65–140)
GLUCOSE SERPL-MCNC: 228 MG/DL (ref 65–140)
GLUCOSE SERPL-MCNC: 237 MG/DL (ref 65–140)
GLUCOSE SERPL-MCNC: 247 MG/DL (ref 65–140)
GLUCOSE SERPL-MCNC: 293 MG/DL (ref 65–140)
GLUCOSE SERPL-MCNC: 351 MG/DL (ref 65–140)
GLUCOSE SERPL-MCNC: 64 MG/DL (ref 65–140)
GLUCOSE SERPL-MCNC: 65 MG/DL (ref 65–140)
GLUCOSE SERPL-MCNC: 72 MG/DL (ref 65–140)
GLUCOSE SERPL-MCNC: 83 MG/DL (ref 65–140)
GLUCOSE SERPL-MCNC: 90 MG/DL (ref 65–140)
HCT VFR BLD AUTO: 33.5 % (ref 34.8–46.1)
HGB BLD-MCNC: 11.9 G/DL (ref 11.5–15.4)
IMM GRANULOCYTES # BLD AUTO: 0.39 THOUSAND/UL (ref 0–0.2)
IMM GRANULOCYTES NFR BLD AUTO: 2 % (ref 0–2)
INR PPP: 1.01 (ref 0.84–1.19)
LYMPHOCYTES # BLD AUTO: 1.57 THOUSANDS/ÂΜL (ref 0.6–4.47)
LYMPHOCYTES NFR BLD AUTO: 9 % (ref 14–44)
MAGNESIUM SERPL-MCNC: 2.4 MG/DL (ref 1.6–2.6)
MCH RBC QN AUTO: 26.3 PG (ref 26.8–34.3)
MCHC RBC AUTO-ENTMCNC: 35.5 G/DL (ref 31.4–37.4)
MCV RBC AUTO: 74 FL (ref 82–98)
MONOCYTES # BLD AUTO: 1.21 THOUSAND/ÂΜL (ref 0.17–1.22)
MONOCYTES NFR BLD AUTO: 7 % (ref 4–12)
NEUTROPHILS # BLD AUTO: 14.72 THOUSANDS/ÂΜL (ref 1.85–7.62)
NEUTS SEG NFR BLD AUTO: 82 % (ref 43–75)
NRBC BLD AUTO-RTO: 0 /100 WBCS
P AXIS: 60 DEGREES
PHOSPHATE SERPL-MCNC: 4 MG/DL (ref 2.7–4.5)
PLATELET # BLD AUTO: 273 THOUSANDS/UL (ref 149–390)
PMV BLD AUTO: 10.3 FL (ref 8.9–12.7)
POTASSIUM SERPL-SCNC: 3.9 MMOL/L (ref 3.5–5.3)
PR INTERVAL: 146 MS
PROT S ACT/NOR PPP: 102 % (ref 61–136)
PROT S PPP-ACNC: 117 % (ref 60–150)
PROT SERPL-MCNC: 6.4 G/DL (ref 6.4–8.4)
PROTHROMBIN TIME: 13.6 SECONDS (ref 11.6–14.5)
QRS AXIS: 56 DEGREES
QRSD INTERVAL: 83 MS
QT INTERVAL: 292 MS
QTC INTERVAL: 392 MS
RBC # BLD AUTO: 4.52 MILLION/UL (ref 3.81–5.12)
SODIUM SERPL-SCNC: 136 MMOL/L (ref 135–147)
T WAVE AXIS: 24 DEGREES
VENTRICULAR RATE: 108 BPM
WBC # BLD AUTO: 17.93 THOUSAND/UL (ref 4.31–10.16)

## 2023-03-27 RX ORDER — HEPARIN SODIUM 10000 [USP'U]/100ML
3-30 INJECTION, SOLUTION INTRAVENOUS
Status: DISCONTINUED | OUTPATIENT
Start: 2023-03-27 | End: 2023-03-29

## 2023-03-27 RX ORDER — BISACODYL 10 MG
10 SUPPOSITORY, RECTAL RECTAL DAILY
Status: DISCONTINUED | OUTPATIENT
Start: 2023-03-27 | End: 2023-03-29

## 2023-03-27 RX ORDER — HYDROMORPHONE HCL/PF 1 MG/ML
1 SYRINGE (ML) INJECTION EVERY 4 HOURS PRN
Status: DISPENSED | OUTPATIENT
Start: 2023-03-27

## 2023-03-27 RX ORDER — BUPIVACAINE HYDROCHLORIDE 2.5 MG/ML
INJECTION, SOLUTION EPIDURAL; INFILTRATION; INTRACAUDAL
Status: COMPLETED | OUTPATIENT
Start: 2023-03-27 | End: 2023-03-27

## 2023-03-27 RX ORDER — HEPARIN SODIUM 1000 [USP'U]/ML
10000 INJECTION, SOLUTION INTRAVENOUS; SUBCUTANEOUS EVERY 6 HOURS PRN
Status: DISCONTINUED | OUTPATIENT
Start: 2023-03-27 | End: 2023-03-29

## 2023-03-27 RX ORDER — HEPARIN SODIUM 1000 [USP'U]/ML
10000 INJECTION, SOLUTION INTRAVENOUS; SUBCUTANEOUS ONCE
Status: COMPLETED | OUTPATIENT
Start: 2023-03-27 | End: 2023-03-27

## 2023-03-27 RX ORDER — LISINOPRIL 20 MG/1
20 TABLET ORAL DAILY
Status: DISCONTINUED | OUTPATIENT
Start: 2023-03-27 | End: 2023-03-27

## 2023-03-27 RX ORDER — AMOXICILLIN 250 MG
1 CAPSULE ORAL
Status: DISCONTINUED | OUTPATIENT
Start: 2023-03-27 | End: 2023-03-28

## 2023-03-27 RX ORDER — METHYLPREDNISOLONE SODIUM SUCCINATE 40 MG/ML
40 INJECTION, POWDER, LYOPHILIZED, FOR SOLUTION INTRAMUSCULAR; INTRAVENOUS DAILY
Status: DISCONTINUED | OUTPATIENT
Start: 2023-03-28 | End: 2023-03-27

## 2023-03-27 RX ORDER — SIMETHICONE 80 MG
80 TABLET,CHEWABLE ORAL EVERY 6 HOURS PRN
Status: DISPENSED | OUTPATIENT
Start: 2023-03-27

## 2023-03-27 RX ORDER — INSULIN LISPRO 100 [IU]/ML
2-12 INJECTION, SOLUTION INTRAVENOUS; SUBCUTANEOUS
Status: ACTIVE | OUTPATIENT
Start: 2023-03-27

## 2023-03-27 RX ORDER — HEPARIN SODIUM 1000 [USP'U]/ML
5000 INJECTION, SOLUTION INTRAVENOUS; SUBCUTANEOUS EVERY 6 HOURS PRN
Status: DISCONTINUED | OUTPATIENT
Start: 2023-03-27 | End: 2023-03-29

## 2023-03-27 RX ORDER — HYDRALAZINE HYDROCHLORIDE 25 MG/1
50 TABLET, FILM COATED ORAL EVERY 8 HOURS SCHEDULED
Status: DISCONTINUED | OUTPATIENT
Start: 2023-03-27 | End: 2023-03-28

## 2023-03-27 RX ORDER — LISINOPRIL 20 MG/1
40 TABLET ORAL DAILY
Status: DISCONTINUED | OUTPATIENT
Start: 2023-03-28 | End: 2023-03-27

## 2023-03-27 RX ORDER — PREDNISONE 20 MG/1
40 TABLET ORAL DAILY
Status: DISCONTINUED | OUTPATIENT
Start: 2023-03-28 | End: 2023-03-30

## 2023-03-27 RX ORDER — INSULIN LISPRO 100 [IU]/ML
2-12 INJECTION, SOLUTION INTRAVENOUS; SUBCUTANEOUS
Status: DISCONTINUED | OUTPATIENT
Start: 2023-03-27 | End: 2023-03-27

## 2023-03-27 RX ORDER — POLYETHYLENE GLYCOL 3350 17 G/17G
17 POWDER, FOR SOLUTION ORAL DAILY
Status: DISPENSED | OUTPATIENT
Start: 2023-03-27

## 2023-03-27 RX ORDER — LANOLIN ALCOHOL/MO/W.PET/CERES
6 CREAM (GRAM) TOPICAL
Status: DISPENSED | OUTPATIENT
Start: 2023-03-27

## 2023-03-27 RX ADMIN — HYDROMORPHONE HYDROCHLORIDE 2 MG: 2 INJECTION, SOLUTION INTRAMUSCULAR; INTRAVENOUS; SUBCUTANEOUS at 05:57

## 2023-03-27 RX ADMIN — CLOPIDOGREL BISULFATE 75 MG: 75 TABLET ORAL at 09:30

## 2023-03-27 RX ADMIN — HYDRALAZINE HYDROCHLORIDE 50 MG: 25 TABLET, FILM COATED ORAL at 21:35

## 2023-03-27 RX ADMIN — LABETALOL HYDROCHLORIDE 20 MG: 5 INJECTION, SOLUTION INTRAVENOUS at 19:03

## 2023-03-27 RX ADMIN — LEVALBUTEROL HYDROCHLORIDE 1.25 MG: 1.25 SOLUTION, CONCENTRATE RESPIRATORY (INHALATION) at 08:27

## 2023-03-27 RX ADMIN — HYDRALAZINE HYDROCHLORIDE 10 MG: 20 INJECTION, SOLUTION INTRAMUSCULAR; INTRAVENOUS at 19:51

## 2023-03-27 RX ADMIN — POLYETHYLENE GLYCOL 3350 17 G: 17 POWDER, FOR SOLUTION ORAL at 13:42

## 2023-03-27 RX ADMIN — ASPIRIN 81 MG CHEWABLE TABLET 81 MG: 81 TABLET CHEWABLE at 09:30

## 2023-03-27 RX ADMIN — LEVALBUTEROL HYDROCHLORIDE 1.25 MG: 1.25 SOLUTION, CONCENTRATE RESPIRATORY (INHALATION) at 03:43

## 2023-03-27 RX ADMIN — GABAPENTIN 300 MG: 250 SOLUTION ORAL at 11:26

## 2023-03-27 RX ADMIN — LABETALOL HYDROCHLORIDE 20 MG: 5 INJECTION, SOLUTION INTRAVENOUS at 03:36

## 2023-03-27 RX ADMIN — NICOTINE 21 MG: 21 PATCH, EXTENDED RELEASE TRANSDERMAL at 09:31

## 2023-03-27 RX ADMIN — OXYCODONE HYDROCHLORIDE 10 MG: 10 TABLET ORAL at 21:35

## 2023-03-27 RX ADMIN — LEVALBUTEROL HYDROCHLORIDE 1.25 MG: 1.25 SOLUTION, CONCENTRATE RESPIRATORY (INHALATION) at 20:00

## 2023-03-27 RX ADMIN — ATORVASTATIN CALCIUM 40 MG: 40 TABLET, FILM COATED ORAL at 16:12

## 2023-03-27 RX ADMIN — LABETALOL HYDROCHLORIDE 20 MG: 5 INJECTION, SOLUTION INTRAVENOUS at 13:42

## 2023-03-27 RX ADMIN — METOPROLOL TARTRATE 25 MG: 25 TABLET, FILM COATED ORAL at 11:26

## 2023-03-27 RX ADMIN — INSULIN GLARGINE 20 UNITS: 100 INJECTION, SOLUTION SUBCUTANEOUS at 21:35

## 2023-03-27 RX ADMIN — OXYCODONE HYDROCHLORIDE 10 MG: 10 TABLET ORAL at 09:30

## 2023-03-27 RX ADMIN — IPRATROPIUM BROMIDE 0.5 MG: 0.5 SOLUTION RESPIRATORY (INHALATION) at 03:42

## 2023-03-27 RX ADMIN — INSULIN LISPRO 4 UNITS: 100 INJECTION, SOLUTION INTRAVENOUS; SUBCUTANEOUS at 17:12

## 2023-03-27 RX ADMIN — SIMETHICONE 80 MG: 80 TABLET, CHEWABLE ORAL at 21:44

## 2023-03-27 RX ADMIN — LISINOPRIL 20 MG: 20 TABLET ORAL at 09:30

## 2023-03-27 RX ADMIN — QUETIAPINE FUMARATE 200 MG: 100 TABLET ORAL at 21:35

## 2023-03-27 RX ADMIN — HEPARIN SODIUM 10000 UNITS: 1000 INJECTION INTRAVENOUS; SUBCUTANEOUS at 16:12

## 2023-03-27 RX ADMIN — INSULIN LISPRO 2 UNITS: 100 INJECTION, SOLUTION INTRAVENOUS; SUBCUTANEOUS at 22:05

## 2023-03-27 RX ADMIN — BUPIVACAINE HYDROCHLORIDE 20 ML: 2.5 INJECTION, SOLUTION EPIDURAL; INFILTRATION; INTRACAUDAL at 14:35

## 2023-03-27 RX ADMIN — LABETALOL HYDROCHLORIDE 20 MG: 5 INJECTION, SOLUTION INTRAVENOUS at 09:32

## 2023-03-27 RX ADMIN — IPRATROPIUM BROMIDE 0.5 MG: 0.5 SOLUTION RESPIRATORY (INHALATION) at 08:27

## 2023-03-27 RX ADMIN — HYDRALAZINE HYDROCHLORIDE 10 MG: 20 INJECTION, SOLUTION INTRAMUSCULAR; INTRAVENOUS at 04:23

## 2023-03-27 RX ADMIN — INSULIN GLARGINE 20 UNITS: 100 INJECTION, SOLUTION SUBCUTANEOUS at 09:30

## 2023-03-27 RX ADMIN — HYDROMORPHONE HYDROCHLORIDE 1 MG: 1 INJECTION, SOLUTION INTRAMUSCULAR; INTRAVENOUS; SUBCUTANEOUS at 08:00

## 2023-03-27 RX ADMIN — BISACODYL 10 MG: 10 SUPPOSITORY RECTAL at 19:03

## 2023-03-27 RX ADMIN — BUPIVACAINE 20 ML: 13.3 INJECTION, SUSPENSION, LIPOSOMAL INFILTRATION at 14:00

## 2023-03-27 RX ADMIN — HYDROMORPHONE HYDROCHLORIDE 2 MG: 2 INJECTION, SOLUTION INTRAMUSCULAR; INTRAVENOUS; SUBCUTANEOUS at 00:34

## 2023-03-27 RX ADMIN — SENNOSIDES AND DOCUSATE SODIUM 1 TABLET: 8.6; 5 TABLET ORAL at 21:35

## 2023-03-27 RX ADMIN — INSULIN LISPRO 10 UNITS: 100 INJECTION, SOLUTION INTRAVENOUS; SUBCUTANEOUS at 11:45

## 2023-03-27 RX ADMIN — HEPARIN SODIUM 7500 UNITS: 5000 INJECTION INTRAVENOUS; SUBCUTANEOUS at 05:58

## 2023-03-27 RX ADMIN — BUPIVACAINE HYDROCHLORIDE 10 ML: 2.5 INJECTION, SOLUTION EPIDURAL; INFILTRATION; INTRACAUDAL; PERINEURAL at 14:31

## 2023-03-27 RX ADMIN — HEPARIN SODIUM 18 UNITS/KG/HR: 10000 INJECTION, SOLUTION INTRAVENOUS at 16:12

## 2023-03-27 RX ADMIN — LEVALBUTEROL HYDROCHLORIDE 1.25 MG: 1.25 SOLUTION, CONCENTRATE RESPIRATORY (INHALATION) at 13:42

## 2023-03-27 RX ADMIN — HYDROMORPHONE HYDROCHLORIDE 1 MG: 1 INJECTION, SOLUTION INTRAMUSCULAR; INTRAVENOUS; SUBCUTANEOUS at 12:30

## 2023-03-27 RX ADMIN — IPRATROPIUM BROMIDE 0.5 MG: 0.5 SOLUTION RESPIRATORY (INHALATION) at 20:00

## 2023-03-27 RX ADMIN — CHLORHEXIDINE GLUCONATE 0.12% ORAL RINSE 15 ML: 1.2 LIQUID ORAL at 09:30

## 2023-03-27 RX ADMIN — CHLORHEXIDINE GLUCONATE 0.12% ORAL RINSE 15 ML: 1.2 LIQUID ORAL at 21:35

## 2023-03-27 RX ADMIN — IPRATROPIUM BROMIDE 0.5 MG: 0.5 SOLUTION RESPIRATORY (INHALATION) at 13:42

## 2023-03-27 RX ADMIN — MELATONIN 6 MG: at 21:35

## 2023-03-27 RX ADMIN — METHYLPREDNISOLONE SODIUM SUCCINATE 40 MG: 40 INJECTION, POWDER, FOR SOLUTION INTRAMUSCULAR; INTRAVENOUS at 05:58

## 2023-03-27 RX ADMIN — OXYCODONE HYDROCHLORIDE 10 MG: 10 TABLET ORAL at 04:20

## 2023-03-27 RX ADMIN — GABAPENTIN 300 MG: 250 SOLUTION ORAL at 19:03

## 2023-03-27 NOTE — CONSULTS
PHYSICAL MEDICINE AND REHABILITATION CONSULT NOTE  Kian Ann 40 y o  female MRN: [id number]  Unit/Bed#: MICU 04 Encounter: [id number]    Requested by (Physician/Service): Maryellen Monroy DO  Reason for Consultation:  Assessment of rehabilitation needs    Assessment:  Rehabilitation Diagnosis:   • Left fem-pop bypass/stent with restenosis and occlusion pending AKA  • Impaired mobility and self care    Recommendations:  Rehabilitation Plan:  • Recommend urinary retention protocol  • Agree with adding mirlax, senokot and suppository for constipation  • Continue PT/OT (SLP) while on acute care  The patient will require inpatient rehabilitation, SNF vs ARC, pending tolerance and disposition plan  Will follow at this time  • Covid-19 Testing: [facility] inpatient rehabilitation units require testing within 48 hours of all potential admissions at this time  *Re-testing is NOT required for patients recovering from COVID-19 infection if isolation has been discontinued per CDC criteria  Medical Co-morbidities Plan:  · PAD s/p left fem-pop bypass  · Diabetes  · Hypertension   · COPD  · GERD  · Constipation  · Urinary retention  · Peripheral neuropathy  · DVT ppx: heparin drip and SCD    Thank you for this consultation  Do not hesitate to contact service with further questions  EMILE Hurst  PM&R    I have spent a total time of 30 minutes on 03/27/23 in caring for this patient including Patient and family education, Counseling / Coordination of care, Documenting in the medical record, Obtaining or reviewing history   and Communicating with other healthcare professionals       History of Present Illness:  Kian Ann is a 40 y o  female with a PMH of diabetes type 2, bipolar, PTSD, PAD with history of left percutaneous transluminal angioplasty of the superficial femoral artery with stent placement 8/2021 and left artery popliteal bypass who presented to the [address] [place] [address] "Network with severe left lower extremity pain  She underwent an angiogram which showed complete occlusion of previous bypass  She was placed on heparin drip  She was initially extubated however required re-intubation due to severe bronchospasm  She was admitted to the MICU and successfuly extubated on 3/26/2023  She had been on Xarelto however SO reported that the patient was not compliant with her home medications  It was recommended that the patient undergo a left AKA which is to be done on 3/29/2023  PM&R are consulted for rehabilitation recommendations  The patient was seen in the MICU  She reports burning pain in the bottom of her right foot as well as left LE pain  She states she does have some SOB  She denies having a bowel movement since admission and feels the urge to urinate but has been unable to  She reports being straight cath recently  She denies any dizziness or numbness/tingliing in her hands/arms  Review of Systems: 10 point ROS negative except for what is noted in HPI    Function:  Prior level of function and living situation: The patient lives with her SO in a mot room in [place]  Her SO does not use a cane or rolling walker  Per patient the room is too small to accommodate a wheelchair  She reports being incarcerated for 6 months and prior to that had an apartment in [facility]  They have been staying at the [facility] for about 6 months  She reports completing up to 11th grade of high school  Current level of function:  Physical Therapy: Pending   Occupational Therapy: Pending     Physical Exam:  BP (!) 190/95   Pulse (!) 106   Temp 98 °F (36 7 °C) (Oral)   Resp 21   Ht 5' 1\" (1 549 m)   Wt 130 kg (287 lb 4 2 oz)   LMP  (LMP Unknown)   SpO2 99%   BMI 54 28 kg/m²        Intake/Output Summary (Last 24 hours) at 3/27/2023 1214  Last data filed at 3/27/2023 1100  Gross per 24 hour   Intake 2929 77 ml   Output 2150 ml   Net 779 77 ml       Body mass index is 54 28 kg/m²        Physical " "Exam  Constitutional:       General: She is not in acute distress  Appearance: She is not toxic-appearing  HENT:      Head: Normocephalic and atraumatic  Right Ear: External ear normal       Left Ear: External ear normal       Nose: Nose normal       Mouth/Throat:      Mouth: Mucous membranes are moist       Pharynx: Oropharynx is clear  Pulmonary:      Effort: Pulmonary effort is normal  No respiratory distress  Musculoskeletal:      Comments: ROM bilateral LE limited due to pain, able to raise right leg off of bed    Neurological:      Mental Status: She is alert and oriented to person, place, and time  Psychiatric:         Mood and Affect: Mood normal             Social History:    Social History     Socioeconomic History   • Marital status: /Civil Union     Spouse name: None   • Number of children: None   • Years of education: None   • Highest education level: None   Occupational History   • None   Tobacco Use   • Smoking status: Every Day     Packs/day: 1 00     Years: 20 00     Pack years: 20 00     Types: Cigarettes   • Smokeless tobacco: Former     Quit date: 4/29/2022   Vaping Use   • Vaping Use: Never used   Substance and Sexual Activity   • Alcohol use: Not Currently     Comment: not currently   • Drug use: Not Currently     Types: Cocaine, Marijuana, \"Crack\" cocaine     Comment: 1 years clean from crack cocaine since 2022   • Sexual activity: Yes     Partners: Female, Male     Birth control/protection: None, Condom   Other Topics Concern   • None   Social History Narrative   • None     Social Determinants of Health     Financial Resource Strain: Not on file   Food Insecurity: No Food Insecurity   • Worried About Running Out of Food in the Last Year: Never true   • Ran Out of Food in the Last Year: Never true   Transportation Needs: No Transportation Needs   • Lack of Transportation (Medical): No   • Lack of Transportation (Non-Medical):  No   Physical Activity: Not on file " Stress: Not on file   Social Connections: Not on file   Intimate Partner Violence: Not on file   Housing Stability: Unknown   • Unable to Pay for Housing in the Last Year: No   • Number of Places Lived in the Last Year: Not on file   • Unstable Housing in the Last Year: No        Family History:    Family History   Problem Relation Age of Onset   • Hypertension Mother    • Diabetes Mother    • HIV Mother    • Heart disease Mother    • No Known Problems Father          Medications:     Current Facility-Administered Medications:   •  acetaminophen (TYLENOL) tablet 650 mg, 650 mg, Oral, Q4H PRN, Mauricio Godwin MD, 650 mg at 03/26/23 2350  •  albuterol (PROVENTIL HFA,VENTOLIN HFA) inhaler 2 puff, 2 puff, Inhalation, Q4H PRN, Chance Kent MD  •  aspirin chewable tablet 81 mg, 81 mg, Oral, Daily, Juan Edmonds MD, 81 mg at 03/27/23 0930  •  atorvastatin (LIPITOR) tablet 40 mg, 40 mg, Oral, Daily With Dinner, Chance Kent MD, 40 mg at 03/26/23 1533  •  bisacodyl (DULCOLAX) rectal suppository 10 mg, 10 mg, Rectal, Daily, Juan Edmonds MD  •  bupivacaine liposomal (EXPAREL) 1 3 % injection 20 mL, 20 mL, Infiltration, Once, Adriane Reeves MD  •  chlorhexidine (PERIDEX) 0 12 % oral rinse 15 mL, 15 mL, Mouth/Throat, Q12H [facility], [facility], 15 mL at 03/27/23 0930  •  gabapentin (NEURONTIN) oral solution 300 mg, 300 mg, Oral, BID, Juan Edmonds MD, 300 mg at 03/27/23 1126  •  heparin (porcine) 25,000 units in 0 45% NaCl 250 mL infusion (premix), 3-30 Units/kg/hr (Order-Specific), Intravenous, Titrated, Glendy Valentine PA-C  •  heparin (porcine) injection 10,000 Units, 10,000 Units, Intravenous, Once, Caitlin Lyons PA-C  •  heparin (porcine) injection 10,000 Units, 10,000 Units, Intravenous, Q6H PRN, Caitlin Lyons PA-C  •  heparin (porcine) injection 5,000 Units, 5,000 Units, Intravenous, Q6H PRN, Caitlin Lyons PA-C  •  hydrALAZINE (APRESOLINE) injection 10 mg, 10 mg, Intravenous, Q6H PRN, Hitesh Weber DO, 10 mg at 03/27/23 0423  •  HYDROmorphone (DILAUDID) injection 1 mg, 1 mg, Intravenous, Q4H PRN, Power Hernandez DO, 1 mg at 03/27/23 0800  •  insulin glargine (LANTUS) subcutaneous injection 20 Units 0 2 mL, 20 Units, Subcutaneous, Q12H [address], Piedad Nur MD, 20 Units at 03/27/23 0930  •  insulin lispro (HumaLOG) 100 units/mL subcutaneous injection 2-12 Units, 2-12 Units, Subcutaneous, TID AC, 10 Units at 03/27/23 1145 **AND** Fingerstick Glucose (POCT), , , 4x Daily AC and at bedtime, Piedad Nur MD  •  insulin lispro (HumaLOG) 100 units/mL subcutaneous injection 2-12 Units, 2-12 Units, Subcutaneous, HS, Piedad Nur MD  •  ipratropium (ATROVENT) 0 02 % inhalation solution 0 5 mg, 0 5 mg, Nebulization, Q6H, Lissette Hayes MD, 0 5 mg at 03/27/23 0827  •  labetalol (NORMODYNE) injection 20 mg, 20 mg, Intravenous, Q4H PRN, Power Hernandez DO, 20 mg at 03/27/23 0932  •  levalbuterol (Charlynne July) inhalation solution 1 25 mg, 1 25 mg, Nebulization, Q6H, Lissette Hayes MD, 1 25 mg at 03/27/23 0827  •  lisinopril (ZESTRIL) tablet 20 mg, 20 mg, Oral, Daily, Aly Fabian MD, 20 mg at 03/27/23 0930  •  melatonin tablet 6 mg, 6 mg, Oral, HS, Power Hernandez DO  •  [START ON 3/28/2023] methylPREDNISolone sodium succinate (Solu-MEDROL) injection 40 mg, 40 mg, Intravenous, Daily, Power Hernandez DO  •  metoprolol tartrate (LOPRESSOR) tablet 25 mg, 25 mg, Oral, Q12H [address], Ana Freeman, DO, 25 mg at 03/27/23 1126  •  nicotine (NICODERM CQ) 21 mg/24 hr TD 24 hr patch 21 mg, 21 mg, Transdermal, Daily, Power Hernandez DO, 21 mg at 03/27/23 6997  •  oxyCODONE (ROXICODONE) immediate release tablet 10 mg, 10 mg, Oral, Q4H PRN, Rojas Kong MD, 10 mg at 03/27/23 0930  •  oxyCODONE (ROXICODONE) IR tablet 5 mg, 5 mg, Oral, Q4H PRN, Rojas Kong MD  •  polyethylene glycol (MIRALAX) packet 17 g, 17 g, Oral, Daily, Piedad Nur MD  •  QUEtiapine (SEROquel) tablet 200 mg, 200 mg, Oral, HS, Rojas Kong MD, 200 mg at 03/26/23 7500  • senna-docusate sodium (SENOKOT S) 8 6-50 mg per tablet 1 tablet, 1 tablet, Oral, HS, Ashley Echevarria MD    Past Medical History:     Past Medical History:   Diagnosis Date   • Asthma    • Atherosclerosis    • Bipolar 1 disorder ([place] [address] )    • COPD (chronic obstructive pulmonary disease) ([place] [address] )    • Depression    • Diabetes mellitus ([place] [address] )    • Hypertension    • Psychiatric disorder     cutting history   • PTSD (post-traumatic stress disorder)    • Schizoaffective disorder ([place] [address] )    • Tendonitis         Past Surgical History:     Past Surgical History:   Procedure Laterality Date   • BYPASS FEMORAL-POPLITEAL Left 2021    Procedure: Left Common Femoral Below Knee to Popliteal Bypass with Insitu GSV graft  Left Lower Extremity Angiogram;  Surgeon: Dat Roldan MD;  Location: [facility];  Service: Vascular   •  SECTION     • EAR SURGERY     • IR LOWER EXTREMITY ANGIOGRAM  2021   • IR LOWER EXTREMITY ANGIOGRAM  2021   • TUBAL LIGATION           Allergies:     No Known Allergies        LABORATORY RESULTS:      Lab Results   Component Value Date    HGB 11 9 2023    HCT 33 5 (L) 2023    WBC 17 93 (H) 2023     Lab Results   Component Value Date    BUN 16 2023    K 3 9 2023     2023    GLUCOSE 177 (H) 2021    CREATININE 0 58 (L) 2023     Lab Results   Component Value Date    PROTIME 13 2 2023    PROTIME 13 3 2023    INR 0 99 2023        DIAGNOSTIC STUDIES: Reviewed  XR chest portable    Result Date: 3/25/2023  Impression: Right PICC at cavoatrial junction  Left greater than right bibasilar opacity, atelectasis and/or pneumonia  Workstation performed: [id number]     XR chest portable    Result Date: 3/25/2023  Impression: NG tube below the diaphragm  Decreased patchy left opacity, question atelectasis versus pneumonia   Workstation performed: [id number]     X-ray chest 1 view    Result Date: 3/25/2023  Impression: Patchy opacity in the left lung  Given normal BNP, question pneumonia  Low endotracheal tube  Recommend retracting   Workstation performed: [id number]

## 2023-03-27 NOTE — CASE MANAGEMENT
Case Management Assessment & Discharge Planning Note    Patient name Erika Vargas  Location MICU 04/MICU 98 MRN [id number]  : 1985 Date 3/27/2023       Current Admission Date: 3/24/2023  Current Admission Diagnosis:Acute respiratory distress   Patient Active Problem List    Diagnosis Date Noted   • Acute respiratory distress 2023   • Class 3 severe obesity due to excess calories without serious comorbidity with body mass index (BMI) of 40 0 to 44 9 in adult [facility]) 2022   • Surgical wound breakdown, sequela 10/28/2021   • Positive D dimer 10/02/2021   • Atherosclerosis of left leg ([place] [address] ) 2021   • Left leg pain 2021   • Bursitis of left knee 2021   • BMI 45 0-49 9, adult ([place] [address] ) 2021   • PAD (peripheral artery disease) ([place] [address] ) 2021   • Diarrhea 2021   • Bilateral leg pain 2021   • Leg pain 2020   • Headache 2020   • Essential hypertension 2020   • Type 2 diabetes mellitus with diabetic polyneuropathy, with long-term current use of insulin ([place] [address] ) 2020   • Paresthesia 2020   • COPD (chronic obstructive pulmonary disease) ([place] [address] ) 2020   • Nicotine dependence 2020   • Bipolar disorder ([place] [address] ) 2020      LOS (days): 3  Geometric Mean LOS (GMLOS) (days): 5 00  Days to GMLOS:2     OBJECTIVE:    Risk of Unplanned Readmission Score: 20 98         Current admission status: Inpatient       Preferred Pharmacy:   [address] [place] [id number]  Phone: [phone] Fax: [phone]    [facility] - [place], [address] [place] [address]  [address] [facility] [place] [address] [id number]  Phone: [phone] Fax: [phone]    [address] [place], [address] [place] [address] [facility]  Phone: [phone] Fax: [phone]    Primary Care Provider: Genevieve Tracey MD    Primary Insurance: [facility]  Secondary Insurance: [address]    ASSESSMENT:  Casandra 26 Proxies    There are no active Health Care Proxies on file  Readmission Root Cause  30 Day Readmission: No    Patient Information  Admitted from[de-identified] Home  Mental Status: Alert  During Assessment patient was accompanied by: Spouse  Assessment information provided by[de-identified] Patient, Spouse  Primary Caregiver: Self  Support Systems: [facility] of Residence: [address] [facility] do you live in?: [address] [facility] entry access options   Select all that apply : No steps to enter home  Type of Current Residence: Other (Comment) (motel)  In the last 12 months, was there a time when you were not able to pay the mortgage or rent on time?: No  In the last 12 months, was there a time when you did not have a steady place to sleep or slept in a shelter (including now)?: No  Homeless/housing insecurity resource given?: N/A  Living Arrangements: Lives w/ Spouse/significant other  Is patient a ?: No    Activities of Daily Living Prior to Admission  Functional Status: Independent  Completes ADLs independently?: Yes  Ambulates independently?: Yes  Does patient use assisted devices?: No  Does patient currently own DME?: No  Does patient have a history of Outpatient Therapy (PT/OT)?: No  Does the patient have a history of Short-Term Rehab?: No  Does patient have a history of HHC?: Yes ([facility])  Does patient currently have [facility]?: No         Patient Information Continued  Income Source: SSI/SSD  Does patient have prescription coverage?: Yes  Within the past 12 months, you worried that your food would run out before you got the money to buy more : Never true  Within the past 12 months, the food you bought just didn't last and you didn't have money to get more : Never true  Food insecurity resource given?: N/A  Does patient receive dialysis treatments?: No  Does patient have a history of substance abuse?: Yes (remote -- sober since 2013)  Is patient currently in treatment for substance abuse?: N/A - sober  Does patient have a history of Mental Health Diagnosis?: Yes (Bipolar, PTSD)  Is patient receiving treatment for mental health?: Yes (receives in-home services from [facility], [facility] is Ashlie Piper)  Has patient received inpatient treatment related to mental health in the last 2 years?: No         Means of Transportation  In the past 12 months, has lack of transportation kept you from medical appointments or from getting medications?: No  In the past 12 months, has lack of transportation kept you from meetings, work, or from getting things needed for daily living?: No        DISCHARGE DETAILS:    Discharge planning discussed with[de-identified] patient, spouse at bedside  Freedom of Choice: Yes     CM contacted family/caregiver?: Yes  Were Treatment Team discharge recommendations reviewed with patient/caregiver?: Yes  Did patient/caregiver verbalize understanding of patient care needs?: Yes  Were patient/caregiver advised of the risks associated with not following Treatment Team discharge recommendations?: Yes    Contacts  Patient Contacts: Lubna Taveras, spouse  Relationship to Patient[de-identified] Family  Contact Method: Phone  Phone Number: [phone]  Reason/Outcome: Continuity of Care, Emergency Contact, Discharge Planning                        Treatment Team Recommendation: Other (TBD)  Discharge Destination Plan[de-identified] Other (TBD -- awaiting recommendations -- likely rehab)  Transport at Discharge : Other (Comment) (TBD)                Patient/caregiver received discharge checklist   Content reviewed  Patient/caregiver encouraged to participate in discharge plan of care prior to discharge home    CM reviewed d/c planning process including the following: identifying help at home, patient preference for d/c planning needs, Discharge Lounge, [facility] Meds to Bed program, availability of treatment team to discuss questions or concerns patient and/or family may have regarding understanding medications and recognizing signs and symptoms once discharged  CM also encouraged patient to follow up with all recommended appointments after discharge  Patient advised of importance for patient and family to participate in managing patient’s medical well being  Additional Comments: Introduced self and role to patient at bedside, wife also present  Patient states she was independent prior to admission, no use of DME  L AKA scheduled for later this week, discussed that recommendations will be made after surgery but rehab  is likely, patient aware and agreeable  CM will continue to follow

## 2023-03-27 NOTE — CONSULTS
Consult Note- Acute Pain Service   Josh Lynn 40 y o  female MRN: [id number]  [facility]: [id number]               Assessment/Plan     Assessment:   Patient Active Problem List   Diagnosis   • Headache   • Essential hypertension   • Type 2 diabetes mellitus with diabetic polyneuropathy, with long-term current use of insulin ([place] [address] )   • Paresthesia   • COPD (chronic obstructive pulmonary disease) ([facility])   • Nicotine dependence   • Bipolar disorder ([place] [address] )   • Leg pain   • Bilateral leg pain   • Diarrhea   • PAD (peripheral artery disease) ([facility])   • BMI 45 0-49 9, adult ([place] [address] )   • Bursitis of left knee   • Left leg pain   • Atherosclerosis of left leg ([facility])   • Positive D dimer   • Surgical wound breakdown, sequela   • Class 3 severe obesity due to excess calories without serious comorbidity with body mass index (BMI) of 40 0 to 44 9 in adult [facility])   • Acute respiratory distress      Josh Lynn is a 40 y o  female with HTN, poorly controlled DM, COPD, Bipolar d/o, super morbid obesity, and an extensive h/o of PAD s/p multiple bypasses/stents/thrombectomies now s/p LLE agram revealing bypass graft occlusion with no further revascularization options  Postprocedural course c/b persistent periprocedural bronchospasm requiring prolonged intubation  Patient now extubated and on NC awaiting surgical management of non-salvagable limb  Developing worsening pain with progressive loss of motor and sensory function of the limb since admission  APS consulted for possible nerve blocks for pre-surgical pain control  I discussed possible pain control options with Tony and her family at bedside  Given her body habitus and bedside imaging limitations, peripheral catheters are not a practical option with a high likelihood of failure  With regard to single shot options, plain LA would likely not last until the time of surgery given uncertain timing of amputation   We could theoretically serially repeat blocks in this case though this would subject her to multiple potentially difficult procedures  We discussed single shots with Exparel  The longevity of these blocks is unpredictable in vasculopathic patients  Due to the potential for neovascularization within the nerve sheath, vascular uptake of the LA is often much more rapid and the block shorter lived  Even if surgery were to take place within the traditional contraindication window of 96hrs, patient is very low risk for repeat blocks  Due to patient's weight and the safety profile of Exparel, LAST is of minimal concern should the patient require repeat blocks  Due to anticipated amputation, repeating block in an insensate nerve bundle is of little consequence  After discussion of R/B/A, will proceed with Exparel based single shots at bedside under ultrasound guidance  Surgical team and bedside nurse aware  Plan:   - Plan for popliteal/sciatic and adductor vs femoral nerve single shot nerve blocks this afternoon  - Consider transitioning to Dilaudid 2 mg/4 mg PO q4hrs PRN, discontinuing oxycodone PO due to patient's past experiences with these medications  - Consider decreasing Dilaudid to 0 5 mg IV q4hrs PRN for breakthrough after peripheral nerve blocks     Multimodal analgesia:  - Continue gabapentin 300 mg PO BID  Low threshold to discontinue if signs of sedation as patient has been on much higher doses in the past with minimal benefit  Was not taking regularly at the time of admission due to perceived lack of benefit  - Tylenol not scheduled due to elevated liver enzymes     Bowel Regimen:  - Low threshold to initiate bowel regimen in the setting of oral opioid analgesic for prevention of OIC  Has not had BM since prior to admission  Currently asymptomatic and exam benign    APS will continue to follow  Please contact Acute Pain Service - [facility] via [employer] from [phone] with additional questions or concerns   See Patrick or Sivan for additional contacts and after hours information  History of Present Illness    Admit Date:  3/24/2023  Hospital Day:  3 days  Primary Service:  Critical Care/ICU  Attending Provider:  Juan Lopes MD  Reason for Consult / Principal Problem: LLE ischemic pain  HPI: Jaci Ramos is a 40 y o  female who presents with recurrent LLE ischemia and related pain after multiple revascularization attempts in the past  Patient underwent agram revealing recurrent disease with intraoperative course c/b bronchospasm necessitating postoperative mechanical ventilation since extubated  She continues to have progressive ischemic pain with inevitable loss of limb to follow and AKA planning underway  Prior to admission, pain managed with PRN Tylenol at home  In the past, feels as though Dilaudid has more successfully managed her pain  Oxycodone provides her with minimal pain relief and causes her pruritis  She has trialed gabapentin in the past with uptitration up 900 mg with little benefit  She has not been taking it regularly prior to admission    Current pain location(s): Left lower extremity from just above knee distally  Pain Scale:  8-10/10  Quality: Tingling, burning, stabbing  Current Analgesic regimen:  Gabapentin 300 mg BID, oxycodone 5 mg/10 mg PO q4hrs PRN moderate/severe pain, Dilaudid 1mg IV q4hrs PRN for breakthrough pain    Pain History: History of diabetic polyneuropathy and LE ischemic pain  Last seen by APS 9/15/21 in the setting of PAD and ischemic pain on brief ketamine infusion  Pain Management Provider:  N/A    I have reviewed the patient's controlled substance dispensing history in the Prescription Drug Monitoring Program in compliance with the [facility] regulations before prescribing any controlled substances        01/28/2022 01/28/2022   3  Pregabalin 50 Mg Capsule 270 00  90  Ma Anastasiya  [id number]   Eas ([id number])   1 01 LME  [place]     10/28/2021  10/28/2021   3  Pregabalin 50 Mg Capsule 270 00  90  [facility] [id number]   Eas ([id number])   1 01 LME  [place]     10/28/2021  10/28/2021   3  Oxycodone-Acetaminophen 5-325 20 00  6  Ma Anastasiya  [id number]   Eas ([id number])   25 00 MME  [place]     10/11/2021  10/11/2021   3  Oxycodone-Acetaminophen 5-325 16 00  4  Be Meenakshi  [id number]   Eas ([id number])   30 00 MME  [place]     10/05/2021  10/05/2021   3  Oxycodone Hcl (Ir) 5 Mg Tablet 10 00  2  Vi Jazmin  [id number]   Eas ([id number])   37 50 MME  [place]     10/03/2021  10/03/2021   2  Oxycodone Hcl (Ir) 5 Mg Tablet 10 00  3  Ra Red  [id number]   St  ([id number])   25 00 MME  [place]        Inpatient consult to Acute Pain Service  Consult performed by: Cheryle Brandon MD  Consult ordered by: Kraig Hubbard MD          Review of Systems   Constitutional: Positive for activity change, appetite change and fatigue  HENT:        Dry nose   Eyes: Negative  Respiratory: Positive for chest tightness and shortness of breath  Cardiovascular: Positive for leg swelling  Gastrointestinal: Positive for constipation  Negative for abdominal distention, abdominal pain and nausea  Endocrine: Negative  Genitourinary: Negative for difficulty urinating  Musculoskeletal: Positive for gait problem and myalgias  Neurological: Positive for weakness and numbness (LLE)  Psychiatric/Behavioral: Positive for dysphoric mood  Historical Information   Past Medical History:   Diagnosis Date   • Asthma    • Atherosclerosis    • Bipolar 1 disorder ([facility] )    • COPD (chronic obstructive pulmonary disease) ([facility] )    • Depression    • Diabetes mellitus ([facility] )    • Hypertension    • Psychiatric disorder     cutting history   • PTSD (post-traumatic stress disorder)    • Schizoaffective disorder ([facility] )    • Tendonitis      Past Surgical History:   Procedure Laterality Date   • BYPASS FEMORAL-POPLITEAL Left 09/14/2021    Procedure: Left Common Femoral Below Knee to Popliteal Bypass with Insitu GSV graft    Left Lower Extremity Angiogram;  Surgeon: Lili Tracey MD; "Location: [facility];  Service: Vascular   •  SECTION     • EAR SURGERY     • IR LOWER EXTREMITY ANGIOGRAM  2021   • IR LOWER EXTREMITY ANGIOGRAM  2021   • TUBAL LIGATION       Social History   Social History     Substance and Sexual Activity   Alcohol Use Not Currently    Comment: not currently     Social History     Substance and Sexual Activity   Drug Use Not Currently   • Types: Cocaine, Marijuana, \"Crack\" cocaine    Comment: 1 years clean from crack cocaine since      Social History     Tobacco Use   Smoking Status Every Day   • Packs/day:  00   • Years:    • Pack years:    • Types: Cigarettes   Smokeless Tobacco Former   • Quit date: 2022     Family History: non-contributory    Meds/Allergies   all current active meds have been reviewed and current meds:   Current Facility-Administered Medications   Medication Dose Route Frequency   • acetaminophen (TYLENOL) tablet 650 mg  650 mg Oral Q4H PRN   • albuterol (PROVENTIL HFA,VENTOLIN HFA) inhaler 2 puff  2 puff Inhalation Q4H PRN   • aspirin chewable tablet 81 mg  81 mg Oral Daily   • atorvastatin (LIPITOR) tablet 40 mg  40 mg Oral Daily With Dinner   • chlorhexidine (PERIDEX) 0 12 % oral rinse 15 mL  15 mL Mouth/Throat Q12H [address]   • clopidogrel (PLAVIX) tablet 75 mg  75 mg Oral Daily   • gabapentin (NEURONTIN) oral solution 300 mg  300 mg Oral BID   • heparin (porcine) subcutaneous injection 7,500 Units  7,500 Units Subcutaneous Q8H [address]   • hydrALAZINE (APRESOLINE) injection 10 mg  10 mg Intravenous Q6H PRN   • HYDROmorphone (DILAUDID) injection 1 mg  1 mg Intravenous Q4H PRN   • insulin glargine (LANTUS) subcutaneous injection 20 Units 0 2 mL  20 Units Subcutaneous Q12H ABDULKADIR   • insulin lispro (HumaLOG) 100 units/mL subcutaneous injection 2-12 Units  2-12 Units Subcutaneous TID AC   • ipratropium (ATROVENT) 0 02 % inhalation solution 0 5 mg  0 5 mg Nebulization Q6H   • labetalol (NORMODYNE) injection 20 mg  20 mg Intravenous " Q4H PRN   • levalbuterol (XOPENEX) inhalation solution 1 25 mg  1 25 mg Nebulization Q6H   • lisinopril (ZESTRIL) tablet 20 mg  20 mg Oral Daily   • melatonin tablet 6 mg  6 mg Oral HS   • methylPREDNISolone sodium succinate (Solu-MEDROL) injection 40 mg  40 mg Intravenous Q12H [facility]   • nicotine (NICODERM CQ) 21 mg/24 hr TD 24 hr patch 21 mg  21 mg Transdermal Daily   • oxyCODONE (ROXICODONE) immediate release tablet 10 mg  10 mg Oral Q4H PRN   • oxyCODONE (ROXICODONE) IR tablet 5 mg  5 mg Oral Q4H PRN   • QUEtiapine (SEROquel) tablet 200 mg  200 mg Oral HS       No Known Allergies    Objective   Temp:  [98 °F (36 7 °C)-100 °F (37 8 °C)] 98 °F (36 7 °C)  HR:  [] 100  Resp:  [13-27] 16  BP: (148-193)/() 166/81  Arterial Line BP: (134-150)/(72-80) 134/72    Intake/Output Summary (Last 24 hours) at 3/27/2023 0934  Last data filed at 3/27/2023 0800  Gross per 24 hour   Intake 2466 43 ml   Output 1760 ml   Net 706 43 ml       Physical Exam  Constitutional:       Appearance: She is obese  She is ill-appearing  HENT:      Head: Normocephalic  Nose:      Comments: 2L NC O2 in place     Mouth/Throat:      Mouth: Mucous membranes are dry  Eyes:      Extraocular Movements: Extraocular movements intact  Cardiovascular:      Rate and Rhythm: Tachycardia present  Pulmonary:      Effort: Pulmonary effort is normal    Chest:      Chest wall: No tenderness  Abdominal:      General: Abdomen is flat  Palpations: Abdomen is soft  Tenderness: There is no abdominal tenderness  There is no guarding  Genitourinary:     Comments: Deferred  Musculoskeletal:      Comments: LLE TTP with tingling/burning   Skin:     General: Skin is dry  Comments: Cold LLE    Neurological:      Mental Status: She is alert and oriented to person, place, and time  Mental status is at baseline        Comments: Loss of sensation of LLE from just above knee into foot with sensation to pressure only   Psychiatric: Mood and Affect: Mood normal          Behavior: Behavior normal        Lab Results:   CBC:   Lab Results   Component Value Date    WBC 17 93 (H) 03/27/2023    HGB 11 9 03/27/2023    HCT 33 5 (L) 03/27/2023    MCV 74 (L) 03/27/2023     03/27/2023    MCH 26 3 (L) 03/27/2023    MCHC 35 5 03/27/2023    RDW 15 9 (H) 03/27/2023    MPV 10 3 03/27/2023    NRBC 0 03/27/2023   , CMP:   Lab Results   Component Value Date    SODIUM 136 03/27/2023    K 3 9 03/27/2023     03/27/2023    CO2 25 03/27/2023    BUN 16 03/27/2023    CREATININE 0 58 (L) 03/27/2023    CALCIUM 7 8 (L) 03/27/2023     (H) 03/27/2023    ALT 58 03/27/2023    ALKPHOS 98 03/27/2023    EGFR 118 03/27/2023   , PT/PTT:No results found for: PT, PTT    Imaging Studies: I have personally reviewed pertinent reports  EKG, Pathology, and Other Studies: I have personally reviewed pertinent reports  Please note that the APS provides consultative services regarding pain management only  With the exception of ketamine and epidural infusions and except when indicated, final decisions regarding starting or changing doses of analgesic medications are at the discretion of the consulting service  Off hours consultation and/or medication management is generally not available      Radha Irwin MD  Acute Pain Service

## 2023-03-27 NOTE — PROGRESS NOTES
[facility] [facility]  Critical Care Progress Note  Date of Service: 3/27/2023  Hospital Day: 3  Name: Myrtle Greenwood  MRN: [id number]  Unit/Bed#: MICU 04    Assessment/Plan   Neuro:   · Diagnosis: Analgesia  · Plan:   · Oxycodone 5/10 mg, dilaudid IV PRN for breakthrough pain  · Gabapentin 300 mg BID  · Diagnosis: Bipolar disorder  · Plan:  · Continue home seroquel 200 mg qHS    CV:   · Diagnosis: Peripheral artery disease, s/p Left fem-BK pop bypass/stent with restenosis and occlusion  · Plan:   · Vascular surgery following  · S/p angiogram on 3/24 which showed complete occlusion of the left fem-BK pop bypass graft  · Will need aggressive exercise program to try to develop collateral circulation, may eventually need amputation  · Continue asa and plavix and statin  · Diagnosis: hypertension  · Plan:   · Resume home lisinopril 20 mg daily**  · PRN hydralazine and labetalol    Pulm:  · Diagnosis: COPD, acute exacerabtion requiring re-intubation after angiogram, extubated on 3/26  · Plan:   · Maintain O2 sat>90%  · Continue solumedrol 40 mg q12, can consider transition to PO prednisone  · Continue nebs q6    GI:   · Diagnosis: GERD  · Plan:   · Continue home protonix    :   No active issues    F/E/N:   · F: none  · E: monitor, replete to maintain K>4, Mag>2  · N: Carb controlled diet      Heme/Onc:   · Diagnosis: Recurrent stenosis/occlusion of LLE bypass graft and stent  · Plan:   · Heme onc consulted, appreciate recs  · Will need heme onc follow up outpatient, will likely eventually need to be placed back on anticoagulation    Endo:   · Diagnosis: Type 2 diabetes  · Plan:   · Insulin gtt discontinued  · Continue home lantus 20 U BID  · SSI  · Goal -180    ID:   No active issues    MSK/Skin:   No active issues    Disposition: Med Surg    ICU Core Measures     A: Assess, Prevent, and Manage Pain · Has pain been assessed? Yes  · Need for changes to pain regimen?  No   B: Both SAT/SAT  · N/A   C: Choice of Sedation · RASS Goal: 0 Alert and Calm or N/A patient not on sedation  · Need for changes to sedation or analgesia regimen? No   D: Delirium · CAM-ICU: Negative   E: Early Mobility  · Plan for early mobility? Yes   F: Family Engagement · Plan for family engagement today? Yes       Review of Invasive Devices:      Central access plan: Patient requires PICC secondary to need for medication administration, blood draws      Prophylaxis:  VTE VTE covered by:  heparin (porcine), Subcutaneous, 7,500 Units at 03/26/23 2249       Stress Ulcer  not ordered          Subjective   HPI/24hr events:     HPI  40 y o  female with PMH of DM-2, COPD, Morbid Obesity, PAD s/p LLE bypass and stenting 66/8890 complicated with stent thrombosis, presented initially for Left Leg Angiogram & possible redo bypass with Vascular Surgery (please refer to surgery Rodrigo Lamas notes for details,) the previous bypass was entirely occluded, with no outflow, was given 9000 units IV heparin during the procedure which was then reversed with Protamine, on extubation - per sign out by anesthesia - had severe bronchospasm requiring re-intubation  Brought to MICU for further eval and management, intubated and sedated on arrival     24 hour events:  Patient was extubated  Patient was started on diet and her home lantus resume, insulin gtt discontinued overnight due to low blood sugars  Patient had urinary retention requiring straight cath  Review of Systems   All other systems reviewed and are negative         Objective                            Vitals I/O      Most Recent Min/Max in 24hrs   Temp 98 7 °F (37 1 °C) Temp  Min: 97 9 °F (36 6 °C)  Max: 100 °F (37 8 °C)   Pulse 104 Pulse  Min: 88  Max: 116   Resp 19 Resp  Min: 13  Max: 22   BP (!) 172/84 BP  Min: 81/47  Max: 193/91   O2 Sat 97 % SpO2  Min: 86 %  Max: 100 %      Intake/Output Summary (Last 24 hours) at 3/27/2023 0059  Last data filed at 3/26/2023 0736  Gross per 24 hour   Intake 3122 59 ml   Output 1000 ml   Net 2122 59 ml         Diet Paul/CHO Controlled; Consistent Carbohydrate Diet Level 2 (5 carb servings/75 grams CHO/meal)     Invasive Monitoring Physical exam    Physical Exam  Vitals and nursing note reviewed  Constitutional:       Appearance: She is obese  HENT:      Head: Normocephalic and atraumatic  Right Ear: External ear normal       Left Ear: External ear normal       Nose: Nose normal       Mouth/Throat:      Mouth: Mucous membranes are moist    Eyes:      Extraocular Movements: Extraocular movements intact  Conjunctiva/sclera: Conjunctivae normal    Cardiovascular:      Rate and Rhythm: Normal rate and regular rhythm  Pulses: Normal pulses  Heart sounds: Normal heart sounds  No murmur heard  No friction rub  No gallop  Pulmonary:      Effort: Pulmonary effort is normal  No respiratory distress  Breath sounds: Normal breath sounds  No wheezing, rhonchi or rales  Abdominal:      General: Abdomen is flat  There is no distension  Tenderness: There is no abdominal tenderness  There is no guarding or rebound  Musculoskeletal:         General: Tenderness present  Cervical back: No tenderness  Comments: Tenderness and cool left lower extremity  Skin:     General: Skin is dry  Neurological:      General: No focal deficit present  Mental Status: She is alert            Diagnostic Studies      EKG: no new ekg  Imaging:  no new imaging     Medications:  Scheduled PRN   aspirin, 81 mg, Daily  atorvastatin, 40 mg, Daily With Dinner  chlorhexidine, 15 mL, Q12H [address]  clopidogrel, 75 mg, Daily  gabapentin, 300 mg, BID  heparin (porcine), 7,500 Units, Q8H ABDULKADIR  insulin glargine, 20 Units, Q12H ABDULKADIR  ipratropium, 0 5 mg, Q6H  levalbuterol, 1 25 mg, Q6H  methylPREDNISolone sodium succinate, 40 mg, Q12H ABDULKADIR  nicotine, 21 mg, Daily  QUEtiapine, 200 mg, HS      acetaminophen, 650 mg, Q4H PRN  albuterol, 2 puff, Q4H PRN  hydrALAZINE, 10 mg, Q6H PRN  HYDROmorphone, 2 mg, Q4H PRN  labetalol, 20 mg, Q4H PRN  levalbuterol, 1 25 mg, Q4H PRN  oxyCODONE, 10 mg, Q4H PRN  oxyCODONE, 5 mg, Q4H PRN       Continuous    dexmedetomidine, 0 1-0 7 mcg/kg/hr, Last Rate: Stopped (03/26/23 1011)  insulin regular (HumuLIN R,NovoLIN R) infusion, 0 3-21 Units/hr, Last Rate: 1 Units/hr (03/27/23 0027)         Labs:    CBC    Recent Labs     03/25/23 0419 03/26/23  0437   WBC 13 76* 17 28*   HGB 12 4 11 5   HCT 35 0 33 3*    278   BANDSPCT 4  --      BMP    Recent Labs     03/25/23 0419 03/26/23  0437   SODIUM 136 138   K 3 9 4 3   * 111*   CO2 21 24   AGAP 6 3*   BUN 10 10   CREATININE 0 58* 0 54*   CALCIUM 8 2* 7 8*       Coags    No recent results     Additional Electrolytes  Recent Labs     03/26/23  0437   MG 2 5   PHOS 3 2          Blood Gas    No recent results  No recent results LFTs  Recent Labs     03/25/23 0419 03/26/23  0437   ALT 35 33   AST 19 49*   ALKPHOS 109 95   ALB 2 7* 2 5*   TBILI 0 23 0 22       Infectious  No recent results  Glucose  Recent Labs     03/25/23 0419 03/26/23  0437   GLUC 211* 207*                   Ismael Davidson MD

## 2023-03-27 NOTE — OCCUPATIONAL THERAPY NOTE
Occupational Therapy         Patient Name: Carina OLIVIER Date: 3/27/2023           03/27/23 1200   OT Last Visit   OT Visit Date 03/27/23   Note Type   Note type Evaluation   Cancel Reasons Other   Additional Comments Pt pending possible LE amputation mid week - will defer and address OT needs post sx     [facility]

## 2023-03-27 NOTE — PLAN OF CARE
Assumed care for pt 2300- pt blood sugar trended down rapidly, discussed with covering attending Dr Anderson Reese ok to resume gtt per protocol, repeat blood sugar 90 restarted gtt in al 3, next blood sugar was 65, pt did not want orange juice but willing to drink regular soda and eat cookies, was found falling asleep while eating cookies, repeat sugar 64  Rn stayed with pt until finishing the snack provided, the pt then drank 2 milks, a pack of cookies with peanut butter and a 2nd pepsi  Blood sugar then at 125  Pt then requested pretzels and breakfast at 0400  Continues to report pain 8-9/10 when reassessing pt pain unchanged after prn but sleeping soundly without stimuli  Call bell in reach  Problem: MOBILITY - ADULT  Goal: Maintain or return to baseline ADL function  Description: INTERVENTIONS:  -  Assess patient's ability to carry out ADLs; assess patient's baseline for ADL function and identify physical deficits which impact ability to perform ADLs (bathing, care of mouth/teeth, toileting, grooming, dressing, etc )  - Assess/evaluate cause of self-care deficits   - Assess range of motion  - Assess patient's mobility; develop plan if impaired  - Assess patient's need for assistive devices and provide as appropriate  - Encourage maximum independence but intervene and supervise when necessary  - Involve family in performance of ADLs  - Assess for home care needs following discharge   - Consider OT consult to assist with ADL evaluation and planning for discharge  - Provide patient education as appropriate  Outcome: Progressing  Goal: Maintains/Returns to pre admission functional level  Description: INTERVENTIONS:  - Perform BMAT or MOVE assessment daily    - Set and communicate daily mobility goal to care team and patient/family/caregiver     - Collaborate with rehabilitation services on mobility goals if consulted    - Out of bed for toileting  - Record patient progress and toleration of activity level   Outcome: Progressing     Problem: SAFETY,RESTRAINT: NV/NON-SELF DESTRUCTIVE BEHAVIOR  Goal: Remains free of harm/injury (restraint for non violent/non self-detsructive behavior)  Description: INTERVENTIONS:  - Instruct patient/family regarding restraint use   - Assess and monitor physiologic and psychological status   - Provide interventions and comfort measures to meet assessed patient needs   - Identify and implement measures to help patient regain control  - Assess readiness for release of restraint   Outcome: Progressing  Goal: Returns to optimal restraint-free functioning  Description: INTERVENTIONS:  - Assess the patient's behavior and symptoms that indicate continued need for restraint  - Identify and implement measures to help patient regain control  - Assess readiness for release of restraint   Outcome: Progressing     Problem: Prexisting or High Potential for Compromised Skin Integrity  Goal: Skin integrity is maintained or improved  Description: INTERVENTIONS:  - Identify patients at risk for skin breakdown  - Assess and monitor skin integrity  - Assess and monitor nutrition and hydration status  - Monitor labs   - Assess for incontinence   - Turn and reposition patient  - Assist with mobility/ambulation  - Relieve pressure over bony prominences  - Avoid friction and shearing  - Provide appropriate hygiene as needed including keeping skin clean and dry  - Evaluate need for skin moisturizer/barrier cream  - Collaborate with interdisciplinary team   - Patient/family teaching  - Consider wound care consult   Outcome: Progressing     Problem: Nutrition/Hydration-ADULT  Goal: Nutrient/Hydration intake appropriate for improving, restoring or maintaining nutritional needs  Description: Monitor and assess patient's nutrition/hydration status for malnutrition  Collaborate with interdisciplinary team and initiate plan and interventions as ordered  Monitor patient's weight and dietary intake as ordered or per policy  Utilize nutrition screening tool and intervene as necessary  Determine patient's food preferences and provide high-protein, high-caloric foods as appropriate       INTERVENTIONS:  - Monitor oral intake, urinary output, labs, and treatment plans  - Assess nutrition and hydration status and recommend course of action  - Evaluate amount of meals eaten  - Assist patient with eating if necessary   - Allow adequate time for meals  - Recommend/ encourage appropriate diets, oral nutritional supplements, and vitamin/mineral supplements  - Order, calculate, and assess calorie counts as needed  - Recommend, monitor, and adjust tube feedings and TPN/PPN based on assessed needs  - Assess need for intravenous fluids  - Provide specific nutrition/hydration education as appropriate  - Include patient/family/caregiver in decisions related to nutrition  Outcome: Progressing

## 2023-03-27 NOTE — ANESTHESIA POSTPROCEDURE EVALUATION
Post-Op Assessment Note    CV Status:  Stable    Pain management: satisfactory to patient     Mental Status:  Alert and awake (Tearful)   Hydration Status:  Euvolemic   PONV Controlled:  Controlled   Airway Patency:  Patent (NC dangling from nares)      Post Op Vitals Reviewed: Yes      Staff: Anesthesiologist       No notable events documented

## 2023-03-27 NOTE — ANESTHESIA PROCEDURE NOTES
Peripheral Block    Patient location during procedure: holding area  Start time: 3/27/2023 2:35 PM  Reason for block: at surgeon's request and post-op pain management  Staffing  Anesthesiologist: Cheryle Brandon MD  Preanesthetic Checklist  Completed: patient identified, IV checked, site marked, risks and benefits discussed, surgical consent, monitors and equipment checked, pre-op evaluation and timeout performed  Peripheral Block  Patient position: supine  Prep: ChloraPrep  Patient monitoring: continuous pulse ox and frequent blood pressure checks  Block type: popliteal  Laterality: left  Injection technique: single-shot  Procedures: ultrasound guided, Ultrasound guidance required for the procedure to increase accuracy and safety of medication placement and decrease risk of complications    Ultrasound permanent image savedbupivacaine (PF) (MARCAINE) 0 25 % 10 mL - Perineural   20 mL - 3/27/2023 2:35:00 PM  Needle  Needle type: Stimuplex   Needle gauge: 20 G  Needle length: 4 in  Needle localization: ultrasound guidance  Test dose: negative  Assessment  Injection assessment: incremental injection, local visualized surrounding nerve on ultrasound, transient paresthesias and negative aspiration for heme  Paresthesia pain: none  Post-procedure:  site cleaned  patient tolerated the procedure well with no immediate complications  Additional Notes  With Exparel 10 mL

## 2023-03-27 NOTE — PROGRESS NOTES
"Progress Note - Vascular Surgery   Belkys Mobley 40 y o  female MRN: [id number]  Unit/Bed#: MICU 04 Encounter: [id number]    Assessment:  40 y o  F with history of obesity, DM, COPD, PAD with h/o  L SFA PTA/stent 8/11/2021, subsequent stent thrombectomy 8/25/2021 and ultimate  L CFA to BK-pop bypass with in situ GSV 9/14/21 with decreased NIKKI and elevated velocities in proximal anastomosis, now s/p LLE Agram which revealed an occluded left fem-BK pop bypass, no outflow into the foot  Postoperative course was complicated by prolonged intubation for bronchospasm, now extubated     Plan:  Ongoing discussions regarding L AKA timing TBD  Patient now complaining of worsening pain and unable to move L foot  Will likely require L AKA this admission with this progression of symptoms  Prn analgesia   Continue ASA/Plavix   Pulmonary toilet  Remainder of care per ICU     Subjective/Objective     Subjective: Patient complains of worsening L foot pain, not controlled on prn analgesia  States she is not able to move her L foot  Objective:     Blood pressure 166/81, pulse 100, temperature 98 °F (36 7 °C), temperature source Oral, resp  rate (!) 27, height 5' 1\" (1 549 m), weight 130 kg (287 lb 4 2 oz), SpO2 99 %, not currently breastfeeding  ,Body mass index is 54 28 kg/m²        Intake/Output Summary (Last 24 hours) at 3/27/2023 0644  Last data filed at 3/27/2023 0327  Gross per 24 hour   Intake 2774 4 ml   Output 1545 ml   Net 1229 4 ml       Invasive Devices     Peripherally Inserted Central Catheter Line  Duration           PICC Line 93/70/55 Right Basilic 1 day          Peripheral Intravenous Line  Duration           Peripheral IV 03/24/23 Left;Proximal;Ventral (anterior) Forearm 3 days    Peripheral IV 03/24/23 Distal;Left;Ventral (anterior) Forearm 2 days                Physical Exam:   NAD, alert and oriented x3  Normocephalic, atraumatic  Moist mucous membranes   Norm resp effort on 1 L NC   Regular rate  Abd " soft, obese   LLE is cool to the touch, no movement in L foot  No plantar/dorsiflexion  Not able to wiggle toes  No signals in L foot    No calf tenderness or peripheral edema  CN grossly intact   Skin is warm and dry          Lab, Imaging and other studies:  CBC:   Lab Results   Component Value Date    WBC 17 93 (H) 03/27/2023    HGB 11 9 03/27/2023    HCT 33 5 (L) 03/27/2023    MCV 74 (L) 03/27/2023     03/27/2023    MCH 26 3 (L) 03/27/2023    MCHC 35 5 03/27/2023    RDW 15 9 (H) 03/27/2023    MPV 10 3 03/27/2023    NRBC 0 03/27/2023   , CMP:   Lab Results   Component Value Date    SODIUM 136 03/27/2023    K 3 9 03/27/2023     03/27/2023    CO2 25 03/27/2023    BUN 16 03/27/2023    CREATININE 0 58 (L) 03/27/2023    CALCIUM 7 8 (L) 03/27/2023     (H) 03/27/2023    ALT 58 03/27/2023    ALKPHOS 98 03/27/2023    EGFR 118 03/27/2023     VTE Pharmacologic Prophylaxis: Heparin  VTE Mechanical Prophylaxis: sequential compression device

## 2023-03-27 NOTE — ANESTHESIA PROCEDURE NOTES
Peripheral Block    Patient location during procedure: holding area  Start time: 3/27/2023 2:35 PM  Reason for block: at surgeon's request and post-op pain management  Staffing  Performed: Anesthesiologist   Anesthesiologist: Seble Sales MD  Preanesthetic Checklist  Completed: patient identified, IV checked, site marked, risks and benefits discussed, surgical consent, monitors and equipment checked, pre-op evaluation and timeout performed  Peripheral Block  Patient position: supine  Prep: ChloraPrep  Patient monitoring: continuous pulse ox and frequent blood pressure checks  Block type: adductor canal block  Laterality: left  Injection technique: single-shot  Procedures: ultrasound guided, Ultrasound guidance required for the procedure to increase accuracy and safety of medication placement and decrease risk of complications    Ultrasound permanent image savedbupivacaine (PF) (MARCAINE) 0 25 % 10 mL - Perineural   10 mL - 3/27/2023 2:31:00 PM  Needle  Needle type: Stimuplex   Needle gauge: 20 G  Needle length: 4 in  Needle localization: ultrasound guidance  Test dose: negative  Assessment  Injection assessment: incremental injection, local visualized surrounding nerve on ultrasound, no paresthesia on injection and negative aspiration for heme  Paresthesia pain: none  Heart rate change: no  Slow fractionated injection: yes  Post-procedure:  site cleaned  patient tolerated the procedure well with no immediate complications  Additional Notes  With Exparel 10 mL

## 2023-03-28 ENCOUNTER — ANESTHESIA EVENT (INPATIENT)
Dept: PERIOP | Facility: HOSPITAL | Age: 38
End: 2023-03-28

## 2023-03-28 LAB
ABO GROUP BLD: NORMAL
ALBUMIN SERPL BCP-MCNC: 2 G/DL (ref 3.5–5)
ALP SERPL-CCNC: 80 U/L (ref 46–116)
ALT SERPL W P-5'-P-CCNC: 55 U/L (ref 12–78)
ANION GAP SERPL CALCULATED.3IONS-SCNC: 3 MMOL/L (ref 4–13)
APTT PPP: 86 SECONDS (ref 23–37)
APTT PPP: 87 SECONDS (ref 23–37)
APTT PPP: >210 SECONDS (ref 23–37)
APTT PPP: >210 SECONDS (ref 23–37)
APTT SCREEN TO CONFIRM RATIO: 1.11 RATIO (ref 0–1.34)
AST SERPL W P-5'-P-CCNC: 134 U/L (ref 5–45)
B2 GLYCOPROT1 IGA SERPL IA-ACNC: 2.2
B2 GLYCOPROT1 IGG SERPL IA-ACNC: <0.8
B2 GLYCOPROT1 IGM SERPL IA-ACNC: <2.4
BASOPHILS # BLD AUTO: 0.04 THOUSANDS/ÂΜL (ref 0–0.1)
BASOPHILS NFR BLD AUTO: 0 % (ref 0–1)
BILIRUB SERPL-MCNC: 0.42 MG/DL (ref 0.2–1)
BLD GP AB SCN SERPL QL: NEGATIVE
BUN SERPL-MCNC: 17 MG/DL (ref 5–25)
CALCIUM ALBUM COR SERPL-MCNC: 9.2 MG/DL (ref 8.3–10.1)
CALCIUM SERPL-MCNC: 7.6 MG/DL (ref 8.3–10.1)
CARDIOLIPIN IGA SER IA-ACNC: 4.1
CARDIOLIPIN IGG SER IA-ACNC: 0.7
CARDIOLIPIN IGM SER IA-ACNC: 1.4
CHLORIDE SERPL-SCNC: 107 MMOL/L (ref 96–108)
CO2 SERPL-SCNC: 30 MMOL/L (ref 21–32)
CONFIRM APTT/NORMAL: 35.8 SEC (ref 0–47.6)
CREAT SERPL-MCNC: 0.38 MG/DL (ref 0.6–1.3)
EOSINOPHIL # BLD AUTO: 0.17 THOUSAND/ÂΜL (ref 0–0.61)
EOSINOPHIL NFR BLD AUTO: 1 % (ref 0–6)
ERYTHROCYTE [DISTWIDTH] IN BLOOD BY AUTOMATED COUNT: 15.8 % (ref 11.6–15.1)
GFR SERPL CREATININE-BSD FRML MDRD: 136 ML/MIN/1.73SQ M
GLUCOSE SERPL-MCNC: 141 MG/DL (ref 65–140)
GLUCOSE SERPL-MCNC: 384 MG/DL (ref 65–140)
GLUCOSE SERPL-MCNC: 87 MG/DL (ref 65–140)
GLUCOSE SERPL-MCNC: 99 MG/DL (ref 65–140)
HCT VFR BLD AUTO: 30.3 % (ref 34.8–46.1)
HGB BLD-MCNC: 10.7 G/DL (ref 11.5–15.4)
IMM GRANULOCYTES # BLD AUTO: 0.2 THOUSAND/UL (ref 0–0.2)
IMM GRANULOCYTES NFR BLD AUTO: 1 % (ref 0–2)
LA PPP-IMP: NORMAL
LYMPHOCYTES # BLD AUTO: 4.13 THOUSANDS/ÂΜL (ref 0.6–4.47)
LYMPHOCYTES NFR BLD AUTO: 30 % (ref 14–44)
MAGNESIUM SERPL-MCNC: 2.3 MG/DL (ref 1.6–2.6)
MCH RBC QN AUTO: 25.9 PG (ref 26.8–34.3)
MCHC RBC AUTO-ENTMCNC: 35.3 G/DL (ref 31.4–37.4)
MCV RBC AUTO: 73 FL (ref 82–98)
MONOCYTES # BLD AUTO: 0.86 THOUSAND/ÂΜL (ref 0.17–1.22)
MONOCYTES NFR BLD AUTO: 6 % (ref 4–12)
NEUTROPHILS # BLD AUTO: 8.59 THOUSANDS/ÂΜL (ref 1.85–7.62)
NEUTS SEG NFR BLD AUTO: 62 % (ref 43–75)
NRBC BLD AUTO-RTO: 0 /100 WBCS
PHOSPHATE SERPL-MCNC: 4 MG/DL (ref 2.7–4.5)
PLATELET # BLD AUTO: 240 THOUSANDS/UL (ref 149–390)
PMV BLD AUTO: 10.2 FL (ref 8.9–12.7)
POTASSIUM SERPL-SCNC: 3.3 MMOL/L (ref 3.5–5.3)
PROT SERPL-MCNC: 5.6 G/DL (ref 6.4–8.4)
RBC # BLD AUTO: 4.13 MILLION/UL (ref 3.81–5.12)
RH BLD: POSITIVE
SCREEN APTT: 23.3 SEC (ref 0–43.5)
SCREEN DRVVT: 38.4 SEC (ref 0–47)
SODIUM SERPL-SCNC: 140 MMOL/L (ref 135–147)
SPECIMEN EXPIRATION DATE: NORMAL
THROMBIN TIME: 15.4 SEC (ref 0–23)
WBC # BLD AUTO: 13.99 THOUSAND/UL (ref 4.31–10.16)

## 2023-03-28 RX ORDER — POTASSIUM CHLORIDE 20 MEQ/1
40 TABLET, EXTENDED RELEASE ORAL ONCE
Status: COMPLETED | OUTPATIENT
Start: 2023-03-28 | End: 2023-03-28

## 2023-03-28 RX ORDER — INSULIN GLARGINE 100 [IU]/ML
20 INJECTION, SOLUTION SUBCUTANEOUS EVERY 12 HOURS SCHEDULED
Status: DISCONTINUED | OUTPATIENT
Start: 2023-03-29 | End: 2023-03-28

## 2023-03-28 RX ORDER — INSULIN GLARGINE 100 [IU]/ML
20 INJECTION, SOLUTION SUBCUTANEOUS EVERY 12 HOURS SCHEDULED
Status: DISCONTINUED | OUTPATIENT
Start: 2023-03-29 | End: 2023-03-30

## 2023-03-28 RX ORDER — RIVAROXABAN 15 MG-20MG
KIT ORAL
Qty: 1 EACH | Refills: 0 | Status: SHIPPED | OUTPATIENT
Start: 2023-03-28

## 2023-03-28 RX ORDER — POLYETHYLENE GLYCOL 3350 17 G/17G
17 POWDER, FOR SOLUTION ORAL ONCE
Status: COMPLETED | OUTPATIENT
Start: 2023-03-28 | End: 2023-03-28

## 2023-03-28 RX ORDER — CHLORHEXIDINE GLUCONATE 0.12 MG/ML
15 RINSE ORAL ONCE
Status: DISCONTINUED | OUTPATIENT
Start: 2023-03-28 | End: 2023-03-29

## 2023-03-28 RX ORDER — BUDESONIDE 0.5 MG/2ML
0.5 INHALANT ORAL
Status: DISCONTINUED | OUTPATIENT
Start: 2023-03-28 | End: 2023-03-30

## 2023-03-28 RX ORDER — INSULIN GLARGINE 100 [IU]/ML
8 INJECTION, SOLUTION SUBCUTANEOUS ONCE
Status: DISCONTINUED | OUTPATIENT
Start: 2023-03-28 | End: 2023-03-28

## 2023-03-28 RX ORDER — BUDESONIDE 0.5 MG/2ML
0.5 INHALANT ORAL
Status: DISCONTINUED | OUTPATIENT
Start: 2023-03-28 | End: 2023-03-28

## 2023-03-28 RX ORDER — HYDRALAZINE HYDROCHLORIDE 25 MG/1
25 TABLET, FILM COATED ORAL EVERY 8 HOURS SCHEDULED
Status: DISPENSED | OUTPATIENT
Start: 2023-03-28

## 2023-03-28 RX ORDER — AMOXICILLIN 250 MG
2 CAPSULE ORAL
Status: DISCONTINUED | OUTPATIENT
Start: 2023-03-28 | End: 2023-03-29

## 2023-03-28 RX ADMIN — IPRATROPIUM BROMIDE 0.5 MG: 0.5 SOLUTION RESPIRATORY (INHALATION) at 20:01

## 2023-03-28 RX ADMIN — LEVALBUTEROL HYDROCHLORIDE 1.25 MG: 1.25 SOLUTION, CONCENTRATE RESPIRATORY (INHALATION) at 03:45

## 2023-03-28 RX ADMIN — BISACODYL 10 MG: 10 SUPPOSITORY RECTAL at 08:51

## 2023-03-28 RX ADMIN — POTASSIUM CHLORIDE 40 MEQ: 1500 TABLET, EXTENDED RELEASE ORAL at 07:58

## 2023-03-28 RX ADMIN — HEPARIN SODIUM 12 UNITS/KG/HR: 10000 INJECTION, SOLUTION INTRAVENOUS at 23:50

## 2023-03-28 RX ADMIN — INSULIN LISPRO 10 UNITS: 100 INJECTION, SOLUTION INTRAVENOUS; SUBCUTANEOUS at 16:22

## 2023-03-28 RX ADMIN — NICOTINE 21 MG: 21 PATCH, EXTENDED RELEASE TRANSDERMAL at 08:51

## 2023-03-28 RX ADMIN — CHLORHEXIDINE GLUCONATE 0.12% ORAL RINSE 15 ML: 1.2 LIQUID ORAL at 08:51

## 2023-03-28 RX ADMIN — GABAPENTIN 300 MG: 250 SOLUTION ORAL at 17:52

## 2023-03-28 RX ADMIN — MELATONIN 6 MG: at 21:56

## 2023-03-28 RX ADMIN — QUETIAPINE FUMARATE 200 MG: 100 TABLET ORAL at 21:56

## 2023-03-28 RX ADMIN — INSULIN GLARGINE 20 UNITS: 100 INJECTION, SOLUTION SUBCUTANEOUS at 08:51

## 2023-03-28 RX ADMIN — ASPIRIN 81 MG CHEWABLE TABLET 81 MG: 81 TABLET CHEWABLE at 08:51

## 2023-03-28 RX ADMIN — LEVALBUTEROL HYDROCHLORIDE 1.25 MG: 1.25 SOLUTION, CONCENTRATE RESPIRATORY (INHALATION) at 13:31

## 2023-03-28 RX ADMIN — ATORVASTATIN CALCIUM 40 MG: 40 TABLET, FILM COATED ORAL at 16:10

## 2023-03-28 RX ADMIN — HYDRALAZINE HYDROCHLORIDE 50 MG: 25 TABLET, FILM COATED ORAL at 13:36

## 2023-03-28 RX ADMIN — LEVALBUTEROL HYDROCHLORIDE 1.25 MG: 1.25 SOLUTION, CONCENTRATE RESPIRATORY (INHALATION) at 08:11

## 2023-03-28 RX ADMIN — POLYETHYLENE GLYCOL 3350 17 G: 17 POWDER, FOR SOLUTION ORAL at 16:10

## 2023-03-28 RX ADMIN — IPRATROPIUM BROMIDE 0.5 MG: 0.5 SOLUTION RESPIRATORY (INHALATION) at 08:11

## 2023-03-28 RX ADMIN — METHYLNALTREXONE BROMIDE 20 MG: 12 INJECTION, SOLUTION SUBCUTANEOUS at 17:52

## 2023-03-28 RX ADMIN — PREDNISONE 40 MG: 20 TABLET ORAL at 08:51

## 2023-03-28 RX ADMIN — GABAPENTIN 300 MG: 250 SOLUTION ORAL at 08:53

## 2023-03-28 RX ADMIN — LEVALBUTEROL HYDROCHLORIDE 1.25 MG: 1.25 SOLUTION, CONCENTRATE RESPIRATORY (INHALATION) at 20:01

## 2023-03-28 RX ADMIN — POLYETHYLENE GLYCOL 3350 17 G: 17 POWDER, FOR SOLUTION ORAL at 13:34

## 2023-03-28 RX ADMIN — IPRATROPIUM BROMIDE 0.5 MG: 0.5 SOLUTION RESPIRATORY (INHALATION) at 03:45

## 2023-03-28 RX ADMIN — HEPARIN SODIUM 15 UNITS/KG/HR: 10000 INJECTION, SOLUTION INTRAVENOUS at 05:30

## 2023-03-28 RX ADMIN — HYDRALAZINE HYDROCHLORIDE 25 MG: 25 TABLET, FILM COATED ORAL at 21:56

## 2023-03-28 RX ADMIN — HYDRALAZINE HYDROCHLORIDE 50 MG: 25 TABLET, FILM COATED ORAL at 05:58

## 2023-03-28 RX ADMIN — BUDESONIDE 0.5 MG: 0.5 INHALANT ORAL at 20:01

## 2023-03-28 RX ADMIN — IPRATROPIUM BROMIDE 0.5 MG: 0.5 SOLUTION RESPIRATORY (INHALATION) at 13:31

## 2023-03-28 RX ADMIN — INSULIN LISPRO 6 UNITS: 100 INJECTION, SOLUTION INTRAVENOUS; SUBCUTANEOUS at 22:01

## 2023-03-28 RX ADMIN — SENNOSIDES AND DOCUSATE SODIUM 2 TABLET: 8.6; 5 TABLET ORAL at 21:56

## 2023-03-28 RX ADMIN — CHLORHEXIDINE GLUCONATE 0.12% ORAL RINSE 15 ML: 1.2 LIQUID ORAL at 21:53

## 2023-03-28 NOTE — QUICK NOTE
Patient was discharged today with . All belongings packed. AVS reviewed and all questions answered. Patient's spouse was met at bedside, updated on patient's current status, A&P including plan for OR tomorrow with vascular surgery for L BKA amputation  All their questions answered appropriately and CC team will continue to follow and update

## 2023-03-28 NOTE — OCCUPATIONAL THERAPY NOTE
OT CANCEL NOTE    OT orders received  Chart reviewed  Pt is pending OR tomorrow for L AKA  Will hold initial OT evaluation  Will continue to follow pt on caseload and see pt when medically stable and as clinically appropriate        03/28/23 0800   OT Last Visit   OT Visit Date 03/28/23   Note Type   Note type Evaluation; Cancelled Session   Cancel Reasons Patient to operating room         Nakul Suggs MS, OTR/L

## 2023-03-28 NOTE — PROGRESS NOTES
Peripheral Nerve Block Follow-up Note - Acute Pain Service    Noe Dominguez 40 y o  female MRN: [id number]  Unit/Bed#: MICU 04 Encounter: [id number]      Assessment:   Principal Problem:    Acute respiratory distress  Active Problems:    Atherosclerosis of left leg ([address] )    Noe Dominguez is a 40y o  year old female with past medical history significant for hypertension, poorly controlled diabetes, COPD, bipolar disorder, morbid obesity, and extensive history of peripheral arterial disease status post multiple bypasses/stents/thrombectomies now with a nonsalvageable, cold left lower extremity anticipating above-the-knee amputation tomorrow  In the setting of poorly controlled ischemic pain, patient underwent left popliteal and abductor canal nerve blocks with Exparel yesterday to good effect  Upon evaluation today, patient lying in bed comfortably eating breakfast   She denies any pain below her left knee  Continues to have both sensory and motor deficits that preexisted her blocks  She is planned to have surgery tomorrow and I reiterated our team's goals of repeating her peripheral nerve block prior to surgery  She is aware that these blocks will likely not cover her entire surgical site with possible sparing of her obturator and lateral femoral cutaneous distributions  She is also aware that more proximal blocks are anticipated to be logistically challenging, particularly in infra gluteal or transgluteal sciatic nerve block  She otherwise has no complaints at this time, tolerating diet without issue  Passing gas but still yet to have a bowel movement  Denies nausea vomiting  Denies pruritus  Denies tinnitus, perioral numbness, or other LAST related symptoms    Plan:   -Left-sided abductor canal and popliteal nerve blocks with ongoing subjective function    Will be nearly impossible to objectively assess the resolution of these blocks given her pre-existing motor and sensory deficits resulting from ischemia  - We will plan for an coordinate repeat peripheral nerve blocks prior to surgery with her day of surgery anesthesia team  -Oxycodone 5 mg / 10 mg p o  every 4 hours as needed for moderate/severe pain, low threshold to opioid rotate to Dilaudid 2 mg / 4 mg p o  every 4 hours as needed should patient experience breakthrough pain with potential nerve block resolution  - Recommend decreasing Dilaudid to 0 5 mg IV every 4 hours as needed for breakthrough pain     Multimodal analgesia with:  -Gabapentin 300 mg p o  twice daily    [facility] will continue to follow  Please contact Acute Pain Service - [facility] via [facility] from [phone] with additional questions or concerns  See TigerText or Sivan for additional contacts and after hours information  Pain History  Current pain location(s): None  Pain Scale: 0/10  Quality: Not applicable  24 hour history: See assessment    Opioid requirement previous 24 hours: Since peripheral nerve blocks, oxycodone 10 mg p o  in the setting of abdominal pain, no intravenous opioid use    Meds/Allergies   all current active meds have been reviewed    No Known Allergies    Objective     Temp:  [97 8 °F (36 6 °C)-98 4 °F (36 9 °C)] 98 3 °F (36 8 °C)  HR:  [] 100  Resp:  [18-44] 27  BP: ()/() 94/57    Physical Exam  Vitals and nursing note reviewed  Constitutional:       General: She is not in acute distress  Appearance: Normal appearance  She is obese  HENT:      Head: Normocephalic  Nose: Nose normal       Comments: Nasal cannula in place     Mouth/Throat:      Mouth: Mucous membranes are moist       Comments: Actively eating breakfast  Eyes:      Extraocular Movements: Extraocular movements intact  Pupils: Pupils are equal, round, and reactive to light  Pulmonary:      Effort: Pulmonary effort is normal  No respiratory distress  Abdominal:      General: Abdomen is flat  Palpations: Abdomen is soft  Tenderness:  There is no abdominal tenderness  Musculoskeletal:      Cervical back: Normal range of motion  Comments: Absence of motor function of left lower extremity below the knee   Skin:     Comments: Right lower extremity warm/dry  Left lower extremity cool to touch   Neurological:      Mental Status: She is alert and oriented to person, place, and time  Mental status is at baseline  Comments: Absence of sensation of light touch over majority of left lower extremity below the knee, some residual sensation on medial aspect of tibial plateau   Psychiatric:         Mood and Affect: Mood normal          Behavior: Behavior normal            Lab Results:   Results from last 7 days   Lab Units 03/28/23  0556   WBC Thousand/uL 13 99*   HEMOGLOBIN g/dL 10 7*   HEMATOCRIT % 30 3*   PLATELETS Thousands/uL 240      Results from last 7 days   Lab Units 03/28/23  0556   POTASSIUM mmol/L 3 3*   CHLORIDE mmol/L 107   CO2 mmol/L 30   BUN mg/dL 17   CREATININE mg/dL 0 38*   CALCIUM mg/dL 7 6*   ALK PHOS U/L 80   ALT U/L 55   AST U/L 134*       Imaging Studies: I have personally reviewed pertinent reports  EKG, Pathology, and Other Studies: I have personally reviewed pertinent reports  Please note that the APS provides consultative services regarding pain management only  With the exception of ketamine, peripheral nerve catheters, and epidural infusions (and except when indicated), final decisions regarding starting or changing doses of analgesic medications are at the discretion of the consulting service  Off hours consultation and/or medication management is generally not available      Michael Mujica MD  Acute Pain Service

## 2023-03-28 NOTE — PHYSICAL THERAPY NOTE
Physical Therapy Cancellation Note    PT orders received chart review completed  Pt is currently pending AKA  PT will follow and eval as medically appropriate      03/28/23 1400   Note Type   Note type Evaluation; Cancelled Session   Cancel Reasons Patient to operating room       Cleaster Bence, PT

## 2023-03-28 NOTE — PROGRESS NOTES
"Progress Note - Cardiology   Leola Bullock 40 y o  female MRN: [id number]  Unit/Bed#: MICU 04 Encounter: [id number]    Assessment:  40y o  year old female with PMH of PAD s/p LLE stenting complicated by thrombosis and thrombectomy and later bypass in 8/2021, DM2, HTN, COPD, obesity, HLD, active ppd smoker, schizoaffective disorder, bipolar 1, PTSD, who presented for left leg angiogram which showed occluded bypass graft with no reasonable distal target for revascularization  Cardiology consulted for pre-operative assessment and for management of HTN        #HTN  Patient is severely hypertensive with drop in BP after starting hydralazine  SBP 80s-90s overnight  Patient was asymptomatic  Home meds: lisinopril 20mg,  -holding lisinopril per vascular request (can be restarted post op)  -decrease PO hydralazine to 25mg TID  #pre op assessment  See note from 3/27       #PAD  LLE stenting 6/2210 complicated by stent thrombosis and later bypass  Angiogram this admission shows occluded bypass  Vascular surgery recommending amputation  -currently on aspirin and atorvastatin 40mg  -rivaroxaban 2 5mg BID at home   -anticoagulation and antiplatelets per vascular team         #COPD  #hypoxemic respiratory failure  Bronchospasm after extuabation requiring reintubation  Was on  methylprednisolone 40mg daily now stopped  Breathing comfortably       #DM2  On insulin at home  a1c 9 8 in 2022  On insulin at home  #positive stress test  9/2021 had positive stress test (small amount of ischemia in the distribution of the left anterior descending coronary artery)  outpt cardiologist suspicious of artifact  Nonetheless she has no anginal symptoms and has been on aspirin and statin for PAD       #HLD  , HDL 46,  in 2020  Recheck lipids  Consider increasing statin vs  Adding zetia      #Daily smoker   1 ppd smoker  On nicotine patch  \"quitting has been so hard\"  She is motivated to quit   " "       Subjective/Objective     Subjective: patient still having leg pain  No lightheadedness or shortness of breath  Vitals: /67   Pulse 104   Temp 98 3 °F (36 8 °C) (Oral)   Resp 19   Ht 5' 1\" (1 549 m)   Wt 132 kg (290 lb 12 6 oz)   LMP  (LMP Unknown)   SpO2 97%   BMI 54 94 kg/m²   Vitals:    03/24/23 1300 03/28/23 0559   Weight: 130 kg (287 lb 4 2 oz) 132 kg (290 lb 12 6 oz)     Orthostatic Blood Pressures    Flowsheet Row Most Recent Value   Blood Pressure 119/67 filed at 03/28/2023 1000   Patient Position - Orthostatic VS Lying filed at 03/28/2023 0751            Intake/Output Summary (Last 24 hours) at 3/28/2023 1319  Last data filed at 3/28/2023 1001  Gross per 24 hour   Intake 1787 31 ml   Output 1825 ml   Net -37 69 ml       Invasive Devices     Peripherally Inserted Central Catheter Line  Duration           PICC Line 30/91/66 Right Basilic 3 days                    Physical Exam: Constitutional:       General: She is not in acute distress  HENT:      Head: Normocephalic  Eyes:      Extraocular Movements: Extraocular movements intact  Cardiovascular:      Rate and Rhythm: Regular rhythm  Tachycardia present  Heart sounds: No murmur heard  Pulmonary:      Effort: Pulmonary effort is normal  No respiratory distress  Abdominal:      General: There is no distension  Musculoskeletal:      Cervical back: Normal range of motion  Comments: Left leg is cool to touch with no appreciable pulses  Right leg is warm  No significant edema  Neurological:      General: No focal deficit present  Mental Status: She is alert  Lab Results: I have personally reviewed pertinent lab results  Imaging: I have personally reviewed pertinent reports      "

## 2023-03-28 NOTE — PLAN OF CARE
Problem: MOBILITY - ADULT  Goal: Maintain or return to baseline ADL function  Description: INTERVENTIONS:  -  Assess patient's ability to carry out ADLs; assess patient's baseline for ADL function and identify physical deficits which impact ability to perform ADLs (bathing, care of mouth/teeth, toileting, grooming, dressing, etc )  - Assess/evaluate cause of self-care deficits   - Assess range of motion  - Assess patient's mobility; develop plan if impaired  - Assess patient's need for assistive devices and provide as appropriate  - Encourage maximum independence but intervene and supervise when necessary  - Involve family in performance of ADLs  - Assess for home care needs following discharge   - Consider OT consult to assist with ADL evaluation and planning for discharge  - Provide patient education as appropriate  Outcome: Progressing  Goal: Maintains/Returns to pre admission functional level  Description: INTERVENTIONS:  - Perform BMAT or MOVE assessment daily    - Set and communicate daily mobility goal to care team and patient/family/caregiver     - Collaborate with rehabilitation services on mobility goals if consulted    - Out of bed for toileting  - Record patient progress and toleration of activity level   Outcome: Progressing     Problem: Prexisting or High Potential for Compromised Skin Integrity  Goal: Skin integrity is maintained or improved  Description: INTERVENTIONS:  - Identify patients at risk for skin breakdown  - Assess and monitor skin integrity  - Assess and monitor nutrition and hydration status  - Monitor labs   - Assess for incontinence   - Turn and reposition patient  - Assist with mobility/ambulation  - Relieve pressure over bony prominences  - Avoid friction and shearing  - Provide appropriate hygiene as needed including keeping skin clean and dry  - Evaluate need for skin moisturizer/barrier cream  - Collaborate with interdisciplinary team   - Patient/family teaching  - Consider wound care consult   Outcome: Progressing     Problem: Nutrition/Hydration-ADULT  Goal: Nutrient/Hydration intake appropriate for improving, restoring or maintaining nutritional needs  Description: Monitor and assess patient's nutrition/hydration status for malnutrition  Collaborate with interdisciplinary team and initiate plan and interventions as ordered  Monitor patient's weight and dietary intake as ordered or per policy  Utilize nutrition screening tool and intervene as necessary  Determine patient's food preferences and provide high-protein, high-caloric foods as appropriate       INTERVENTIONS:  - Monitor oral intake, urinary output, labs, and treatment plans  - Assess nutrition and hydration status and recommend course of action  - Evaluate amount of meals eaten  - Assist patient with eating if necessary   - Allow adequate time for meals  - Recommend/ encourage appropriate diets, oral nutritional supplements, and vitamin/mineral supplements  - Order, calculate, and assess calorie counts as needed  - Recommend, monitor, and adjust tube feedings and TPN/PPN based on assessed needs  - Assess need for intravenous fluids  - Provide specific nutrition/hydration education as appropriate  - Include patient/family/caregiver in decisions related to nutrition  Outcome: Progressing

## 2023-03-28 NOTE — ASSESSMENT & PLAN NOTE
Assessment:  41 y/o F w/ PMHx of obesity, DM, COPD, PAD with h/o  L SFA PTA/stent 8/11/2021, subsequent stent thrombectomy 8/25/2021 and ultimate  L CFA to BK-pop bypass with in situ GSV 9/14/21     s/p LLE Agram which revealed an occluded left fem-BK pop bypass, no outflow into the foot 3/24     Postoperative course was complicated by prolonged intubation for bronchospasm, now extubated      Plan:  Left AKA scheduled for Wednesday 3/29  NPO after MN   Heparin gtt; Hold Heparin gtt 6H prior to OR  Cardiology- acceptable risk  APS following for pain control, blocks  PM&R following  Continue ASA/Plavix   Pulmonary toilet  Remainder of care per ICU

## 2023-03-28 NOTE — ARC ADMISSION
Referral received for consideration of patient for inpatient acute rehab  Noted that patient is for planned left AKA tomorrow with Vascular Surgery  Will need postop PT/OT evaluations as appropriate, and will continue to follow patient's case at this time  Reviewed patient's case with [facility] physician - will continue to follow at this time, awaiting medical stability and functional progress s/p left AKA planned for tomorrow  Will update as able

## 2023-03-28 NOTE — PROGRESS NOTES
"[facility]  Progress Note  Name: Hal Gannon  MRN: [id number]  Unit/Bed#: MICU 04 I Date of Admission: 3/24/2023   Date of Service: 3/28/2023 I Hospital Day: 4    Assessment/Plan   Atherosclerosis of left leg [facility])  Assessment & Plan  Assessment:  41 y/o F w/ PMHx of obesity, DM, COPD, PAD with h/o  L SFA PTA/stent 8/11/2021, subsequent stent thrombectomy 8/25/2021 and ultimate  L CFA to BK-pop bypass with in situ GSV 9/14/21     s/p LLE Agram which revealed an occluded left fem-BK pop bypass, no outflow into the foot 3/24     Postoperative course was complicated by prolonged intubation for bronchospasm, now extubated      Plan:  Left AKA scheduled for Wednesday 3/29  NPO after MN   Heparin gtt; Hold Heparin gtt 6H prior to OR  Cardiology- acceptable risk  APS following for pain control, blocks  PM&R following  Continue ASA  Pulmonary toilet  Remainder of care per ICU          Subjective:  Pt resting comfortably    Vitals:  /56   Pulse 94   Temp 98 4 °F (36 9 °C) (Oral)   Resp (!) 23   Ht 5' 1\" (1 549 m)   Wt 132 kg (290 lb 12 6 oz)   LMP  (LMP Unknown)   SpO2 96%   BMI 54 94 kg/m²     I/Os:  I/O last 3 completed shifts: In: 4214 4 [P O :3720; I V :370 4;  Feedings:124]  Out: 3695 [Urine:3695]  I/O this shift:  In: 1196 2 [P O :960; I V :236 2]  Out: 850 [Urine:850]    Lab Results and Cultures:   Lab Results   Component Value Date    WBC 13 99 (H) 03/28/2023    HGB 10 7 (L) 03/28/2023    HCT 30 3 (L) 03/28/2023    MCV 73 (L) 03/28/2023     03/28/2023     Lab Results   Component Value Date    GLUCOSE 177 (H) 09/14/2021    CALCIUM 7 6 (L) 03/28/2023    K 3 3 (L) 03/28/2023    CO2 30 03/28/2023     03/28/2023    BUN 17 03/28/2023    CREATININE 0 38 (L) 03/28/2023     Lab Results   Component Value Date    INR 1 01 03/27/2023    INR 0 99 03/24/2023    INR 0 98 09/07/2022    PROTIME 13 6 03/27/2023    PROTIME 13 2 03/24/2023    PROTIME 13 3 " 03/24/2023        Blood Culture:   Lab Results   Component Value Date    BLOODCX No Growth After 5 Days  10/02/2021    BLOODCX No Growth After 5 Days   10/02/2021   ,   Urinalysis:   Lab Results   Component Value Date    COLORU Yellow 09/23/2021    CLARITYU Clear 09/23/2021    SPECGRAV 1 025 09/23/2021    PHUR 6 0 09/23/2021    PHUR 7 5 08/07/2020    LEUKOCYTESUR Negative 09/23/2021    NITRITE Negative 09/23/2021    GLUCOSEU 3+ (A) 09/23/2021    KETONESU Trace (A) 09/23/2021    BILIRUBINUR Negative 09/23/2021    BLOODU Trace-Intact (A) 09/23/2021   ,   Urine Culture: No results found for: URINECX,   Wound Culure: No results found for: WOUNDCULT    Medications:  Current Facility-Administered Medications   Medication Dose Route Frequency   • acetaminophen (TYLENOL) tablet 650 mg  650 mg Oral Q4H PRN   • albuterol (PROVENTIL HFA,VENTOLIN HFA) inhaler 2 puff  2 puff Inhalation Q4H PRN   • aspirin chewable tablet 81 mg  81 mg Oral Daily   • atorvastatin (LIPITOR) tablet 40 mg  40 mg Oral Daily With Dinner   • bisacodyl (DULCOLAX) rectal suppository 10 mg  10 mg Rectal Daily   • chlorhexidine (PERIDEX) 0 12 % oral rinse 15 mL  15 mL Mouth/Throat Q12H [address]   • gabapentin (NEURONTIN) oral solution 300 mg  300 mg Oral BID   • heparin (porcine) 25,000 units in 0 45% NaCl 250 mL infusion (premix)  3-30 Units/kg/hr (Order-Specific) Intravenous Titrated   • heparin (porcine) injection 10,000 Units  10,000 Units Intravenous Q6H PRN   • heparin (porcine) injection 5,000 Units  5,000 Units Intravenous Q6H PRN   • hydrALAZINE (APRESOLINE) injection 10 mg  10 mg Intravenous Q6H PRN   • hydrALAZINE (APRESOLINE) tablet 50 mg  50 mg Oral Q8H [address]   • HYDROmorphone (DILAUDID) injection 1 mg  1 mg Intravenous Q4H PRN   • insulin glargine (LANTUS) subcutaneous injection 20 Units 0 2 mL  20 Units Subcutaneous Q12H [address]   • insulin lispro (HumaLOG) 100 units/mL subcutaneous injection 2-12 Units  2-12 Units Subcutaneous TID AC   • insulin lispro (HumaLOG) 100 units/mL subcutaneous injection 2-12 Units  2-12 Units Subcutaneous HS   • ipratropium (ATROVENT) 0 02 % inhalation solution 0 5 mg  0 5 mg Nebulization Q6H   • levalbuterol (XOPENEX) inhalation solution 1 25 mg  1 25 mg Nebulization Q6H   • melatonin tablet 6 mg  6 mg Oral HS   • nicotine (NICODERM CQ) 21 mg/24 hr TD 24 hr patch 21 mg  21 mg Transdermal Daily   • oxyCODONE (ROXICODONE) immediate release tablet 10 mg  10 mg Oral Q4H PRN   • oxyCODONE (ROXICODONE) IR tablet 5 mg  5 mg Oral Q4H PRN   • polyethylene glycol (MIRALAX) packet 17 g  17 g Oral Daily   • predniSONE tablet 40 mg  40 mg Oral Daily   • QUEtiapine (SEROquel) tablet 200 mg  200 mg Oral HS   • senna-docusate sodium (SENOKOT S) 8 6-50 mg per tablet 1 tablet  1 tablet Oral HS   • simethicone (MYLICON) chewable tablet 80 mg  80 mg Oral Q6H PRN       Physical Exam:    General appearance: alert and oriented, in no acute distress  Lungs: clear to auscultation bilaterally  Heart: S1, S2 normal  Abdomen: soft, non-tender; bowel sounds normal; no masses,  no organomegaly  Extremities: Left foot cool, diminished M/S         Angelinepaco Perera PA-C  3/28/2023

## 2023-03-28 NOTE — PROGRESS NOTES
[facility]  Critical Care Progress Note  Date of Service: 3/28/2023  Hospital Day: 4  Name: Emily Verdugo  MRN: [id number]  Unit/Bed#: MICU 04    Assessment/Plan   Neuro:   · Diagnosis: Bipolar Disorder  · Seroquel 200 QHS  · Diagnosis: Nicotine Addiction  · Nicoderm patch  · Diagnosis: Pain control  · APS following  · S/p L popliteal/sciatic and adductor/femoral nerve block  · Oxycodone 5/10 PRN  · Dilaudid PRN  · Gabapentin 300 BID    CV:   · Diagnosis: PAD s/p L fem-BK pop bypass/stent w/ restenosis and occlusion  · Angiogram 03/24 - complete occlusion L fem-BK pop bypass graft  · Plan:   · Vascular consulted  · Plan for possible OR Wednesday 03/29 - AKA  · Continue ASA  · Continue Atorvastatin  · Clopidogrel d/c'd for OR  · Heparin Gtt  · Diagnosis: HTN  · Plan:  · Cardiology consulted  · D/C ACE/ARB and BB medications preoperative  · Hydralazine 50 mg TID per card recs  · PRN hydralazine      Pulm:  · Diagnosis: COPD  · Acute exacerbation/bronchospasm post-operatively requiring re-intubation after angiogram  · Extubated 03/26  · Plan:   · Maintain O2 sat >90%  · Prednisone  · Nebulizers q6H      GI:   · Diagnosis: GERD  · Plan:   · Protonix  · Diagnosis: Constipation/Gas  · Plan:   · Dulcolax QD  · Miralax QD  · Senna QHS  · Simethicone PRN      :   · No active problems  · Urinary retention protocol  · Monitor I/Os, Cr      F/E/N:   F: none  E: trend and replete  N: Carb controlled diet    Heme/Onc:   · Diagnosis: Recurrent occlusion LLE bypass graft and stent  · Plan:   · Heme onc consulted  · Rec outpatient f/u  · SCDs  · Hep gtt      Endo:   · Diagnosis: T2DM  · Plan:   · Cont home lantus 20 BID  · SSI  · Goal -180      ID:   No active issues    MSK/Skin:   · Diagnosis: Ambulatory dysfunction  · Plan:   · Post-op PT/OT when able      Disposition: Critical care    ICU Core Measures     A: Assess, Prevent, and Manage Pain · Has pain been assessed? Yes  · Need for changes to pain regimen? No   B: Both SAT/SAT  · N/A   C: Choice of Sedation · RASS Goal: 0 Alert and Calm  · Need for changes to sedation or analgesia regimen? No   D: Delirium · CAM-ICU: Negative   E: Early Mobility  · Plan for early mobility? No   F: Family Engagement · Plan for family engagement today? Yes       Review of Invasive Devices:            Prophylaxis:  VTE VTE covered by:  heparin (porcine), Intravenous, Held at 03/28/23 0001  heparin (porcine), Intravenous  heparin (porcine), Intravenous       Stress Ulcer  not ordered          Subjective   HPI/24hr events:   Patient continues to have pain in her Left calf  Also endorsing gas pains with frequent flatulence  Otherwise denies complaints  Plans for possible OR tomorrow  Review of Systems   All other systems reviewed and are negative  Objective                            Vitals I/O      Most Recent Min/Max in 24hrs   Temp 98 4 °F (36 9 °C) Temp  Min: 97 8 °F (36 6 °C)  Max: 98 4 °F (36 9 °C)   Pulse 102 Pulse  Min: 86  Max: 114   Resp (!) 29 Resp  Min: 14  Max: 44   /70 BP  Min: 138/77  Max: 202/97   O2 Sat 97 % SpO2  Min: 92 %  Max: 99 %      Intake/Output Summary (Last 24 hours) at 3/28/2023 0100  Last data filed at 3/27/2023 1955  Gross per 24 hour   Intake 2510 65 ml   Output 3400 ml   Net -889 35 ml         Diet Paul/CHO Controlled; Consistent Carbohydrate Diet Level 2 (5 carb servings/75 grams CHO/meal)     Invasive Monitoring Physical exam    Physical Exam  Vitals and nursing note reviewed  Constitutional:       General: She is not in acute distress  Appearance: She is well-developed  She is obese  HENT:      Head: Normocephalic and atraumatic  Eyes:      Conjunctiva/sclera: Conjunctivae normal    Cardiovascular:      Rate and Rhythm: Normal rate and regular rhythm  Heart sounds: No murmur heard       Comments: No palpable pulses on LLE DP/PT  Pulmonary:      Effort: Pulmonary effort is normal  No respiratory distress  Breath sounds: Normal breath sounds  Abdominal:      Palpations: Abdomen is soft  Tenderness: There is no abdominal tenderness  Musculoskeletal:         General: No swelling  Cervical back: Neck supple  Skin:     General: Skin is dry  Comments: Cold LLE    Neurological:      General: No focal deficit present  Mental Status: She is alert  Psychiatric:         Mood and Affect: Mood normal             Diagnostic Studies      EKG: NSR  Imaging:  I have personally reviewed pertinent reports         Medications:  Scheduled PRN   aspirin, 81 mg, Daily  atorvastatin, 40 mg, Daily With Dinner  bisacodyl, 10 mg, Daily  chlorhexidine, 15 mL, Q12H ABDULKADIR  gabapentin, 300 mg, BID  hydrALAZINE, 50 mg, Q8H ABDULKADIR  insulin glargine, 20 Units, Q12H ABDULKADIR  insulin lispro, 2-12 Units, TID AC  insulin lispro, 2-12 Units, HS  ipratropium, 0 5 mg, Q6H  levalbuterol, 1 25 mg, Q6H  melatonin, 6 mg, HS  nicotine, 21 mg, Daily  polyethylene glycol, 17 g, Daily  predniSONE, 40 mg, Daily  QUEtiapine, 200 mg, HS  senna-docusate sodium, 1 tablet, HS      acetaminophen, 650 mg, Q4H PRN  albuterol, 2 puff, Q4H PRN  heparin (porcine), 10,000 Units, Q6H PRN  heparin (porcine), 5,000 Units, Q6H PRN  hydrALAZINE, 10 mg, Q6H PRN  HYDROmorphone, 1 mg, Q4H PRN  oxyCODONE, 10 mg, Q4H PRN  oxyCODONE, 5 mg, Q4H PRN  simethicone, 80 mg, Q6H PRN       Continuous    heparin (porcine), 3-30 Units/kg/hr (Order-Specific), Last Rate: Stopped (03/28/23 0001)         Labs:    CBC    Recent Labs     03/26/23 0437 03/27/23 0429   WBC 17 28* 17 93*   HGB 11 5 11 9   HCT 33 3* 33 5*    273     BMP    Recent Labs     03/26/23  0437 03/27/23 0429   SODIUM 138 136   K 4 3 3 9   * 108   CO2 24 25   AGAP 3* 3*   BUN 10 16   CREATININE 0 54* 0 58*   CALCIUM 7 8* 7 8*       Coags    Recent Labs     03/27/23  1609 03/27/23  2215   INR 1 01  --    PTT 26 >210*        Additional Electrolytes  Recent Labs 03/26/23  0437 03/27/23  0429   MG 2 5 2 4   PHOS 3 2 4 0          Blood Gas    No recent results  No recent results LFTs  Recent Labs     03/26/23  0437 03/27/23  0429   ALT 33 58   AST 49* 204*   ALKPHOS 95 98   ALB 2 5* 2 5*   TBILI 0 22 0 24       Infectious  No recent results  Glucose  Recent Labs     03/26/23  0437 03/27/23  0429   GLUC 207* 237*                   Pelon Hitchcock MD

## 2023-03-29 ENCOUNTER — ANESTHESIA (INPATIENT)
Dept: PERIOP | Facility: HOSPITAL | Age: 38
End: 2023-03-29

## 2023-03-29 LAB
ANION GAP SERPL CALCULATED.3IONS-SCNC: 4 MMOL/L (ref 4–13)
APTT PPP: 35 SECONDS (ref 23–37)
APTT PPP: 69 SECONDS (ref 23–37)
BUN SERPL-MCNC: 18 MG/DL (ref 5–25)
CALCIUM SERPL-MCNC: 8.8 MG/DL (ref 8.3–10.1)
CHLORIDE SERPL-SCNC: 105 MMOL/L (ref 96–108)
CHOLEST SERPL-MCNC: 177 MG/DL
CO2 SERPL-SCNC: 29 MMOL/L (ref 21–32)
CREAT SERPL-MCNC: 0.42 MG/DL (ref 0.6–1.3)
ERYTHROCYTE [DISTWIDTH] IN BLOOD BY AUTOMATED COUNT: 15.6 % (ref 11.6–15.1)
ERYTHROCYTE [DISTWIDTH] IN BLOOD BY AUTOMATED COUNT: 15.8 % (ref 11.6–15.1)
GFR SERPL CREATININE-BSD FRML MDRD: 132 ML/MIN/1.73SQ M
GLUCOSE SERPL-MCNC: 111 MG/DL (ref 65–140)
GLUCOSE SERPL-MCNC: 120 MG/DL (ref 65–140)
GLUCOSE SERPL-MCNC: 282 MG/DL (ref 65–140)
GLUCOSE SERPL-MCNC: 372 MG/DL (ref 65–140)
GLUCOSE SERPL-MCNC: 450 MG/DL (ref 65–140)
HCT VFR BLD AUTO: 27.5 % (ref 34.8–46.1)
HCT VFR BLD AUTO: 31.2 % (ref 34.8–46.1)
HDLC SERPL-MCNC: 54 MG/DL
HGB BLD-MCNC: 11 G/DL (ref 11.5–15.4)
HGB BLD-MCNC: 9.6 G/DL (ref 11.5–15.4)
LDLC SERPL CALC-MCNC: 106 MG/DL (ref 0–100)
MCH RBC QN AUTO: 25.7 PG (ref 26.8–34.3)
MCH RBC QN AUTO: 26.1 PG (ref 26.8–34.3)
MCHC RBC AUTO-ENTMCNC: 34.9 G/DL (ref 31.4–37.4)
MCHC RBC AUTO-ENTMCNC: 35.3 G/DL (ref 31.4–37.4)
MCV RBC AUTO: 74 FL (ref 82–98)
MCV RBC AUTO: 74 FL (ref 82–98)
NONHDLC SERPL-MCNC: 123 MG/DL
PLATELET # BLD AUTO: 250 THOUSANDS/UL (ref 149–390)
PLATELET # BLD AUTO: 270 THOUSANDS/UL (ref 149–390)
PMV BLD AUTO: 10 FL (ref 8.9–12.7)
PMV BLD AUTO: 10.2 FL (ref 8.9–12.7)
POTASSIUM SERPL-SCNC: 3.7 MMOL/L (ref 3.5–5.3)
RBC # BLD AUTO: 3.73 MILLION/UL (ref 3.81–5.12)
RBC # BLD AUTO: 4.22 MILLION/UL (ref 3.81–5.12)
SODIUM SERPL-SCNC: 138 MMOL/L (ref 135–147)
TRIGL SERPL-MCNC: 84 MG/DL
WBC # BLD AUTO: 15.8 THOUSAND/UL (ref 4.31–10.16)
WBC # BLD AUTO: 16.04 THOUSAND/UL (ref 4.31–10.16)

## 2023-03-29 RX ORDER — MIDAZOLAM HYDROCHLORIDE 2 MG/2ML
INJECTION, SOLUTION INTRAMUSCULAR; INTRAVENOUS AS NEEDED
Status: DISCONTINUED | OUTPATIENT
Start: 2023-03-29 | End: 2023-03-29

## 2023-03-29 RX ORDER — FENTANYL CITRATE 50 UG/ML
100 INJECTION, SOLUTION INTRAMUSCULAR; INTRAVENOUS ONCE
Status: DISCONTINUED | OUTPATIENT
Start: 2023-03-29 | End: 2023-03-30

## 2023-03-29 RX ORDER — PROPOFOL 10 MG/ML
INJECTION, EMULSION INTRAVENOUS CONTINUOUS PRN
Status: DISCONTINUED | OUTPATIENT
Start: 2023-03-29 | End: 2023-03-29

## 2023-03-29 RX ORDER — MAGNESIUM HYDROXIDE 1200 MG/15ML
LIQUID ORAL AS NEEDED
Status: DISCONTINUED | OUTPATIENT
Start: 2023-03-29 | End: 2023-03-29 | Stop reason: HOSPADM

## 2023-03-29 RX ORDER — PROPOFOL 10 MG/ML
5-50 INJECTION, EMULSION INTRAVENOUS
Status: DISCONTINUED | OUTPATIENT
Start: 2023-03-29 | End: 2023-03-29

## 2023-03-29 RX ORDER — GLYCOPYRROLATE 0.2 MG/ML
INJECTION INTRAMUSCULAR; INTRAVENOUS AS NEEDED
Status: DISCONTINUED | OUTPATIENT
Start: 2023-03-29 | End: 2023-03-29

## 2023-03-29 RX ORDER — FENTANYL CITRATE 50 UG/ML
100 INJECTION, SOLUTION INTRAMUSCULAR; INTRAVENOUS ONCE
Status: COMPLETED | OUTPATIENT
Start: 2023-03-29 | End: 2023-03-29

## 2023-03-29 RX ORDER — HYDROMORPHONE HCL/PF 1 MG/ML
SYRINGE (ML) INJECTION AS NEEDED
Status: DISCONTINUED | OUTPATIENT
Start: 2023-03-29 | End: 2023-03-29

## 2023-03-29 RX ORDER — ROPIVACAINE HYDROCHLORIDE 5 MG/ML
INJECTION, SOLUTION EPIDURAL; INFILTRATION; PERINEURAL AS NEEDED
Status: DISCONTINUED | OUTPATIENT
Start: 2023-03-29 | End: 2023-03-29

## 2023-03-29 RX ORDER — HEPARIN SODIUM 10000 [USP'U]/100ML
3-20 INJECTION, SOLUTION INTRAVENOUS
Status: DISCONTINUED | OUTPATIENT
Start: 2023-03-29 | End: 2023-03-29

## 2023-03-29 RX ORDER — DEXMEDETOMIDINE HYDROCHLORIDE 100 UG/ML
INJECTION, SOLUTION INTRAVENOUS AS NEEDED
Status: DISCONTINUED | OUTPATIENT
Start: 2023-03-29 | End: 2023-03-29

## 2023-03-29 RX ORDER — BISACODYL 10 MG
10 SUPPOSITORY, RECTAL RECTAL DAILY PRN
Status: DISPENSED | OUTPATIENT
Start: 2023-03-29

## 2023-03-29 RX ORDER — AMOXICILLIN 250 MG
2 CAPSULE ORAL 2 TIMES DAILY
Status: DISPENSED | OUTPATIENT
Start: 2023-03-29

## 2023-03-29 RX ORDER — HEPARIN SODIUM 1000 [USP'U]/ML
4000 INJECTION, SOLUTION INTRAVENOUS; SUBCUTANEOUS ONCE
Status: DISCONTINUED | OUTPATIENT
Start: 2023-03-29 | End: 2023-03-29

## 2023-03-29 RX ORDER — PROPOFOL 10 MG/ML
INJECTION, EMULSION INTRAVENOUS AS NEEDED
Status: DISCONTINUED | OUTPATIENT
Start: 2023-03-29 | End: 2023-03-29

## 2023-03-29 RX ORDER — SUCCINYLCHOLINE/SOD CL,ISO/PF 100 MG/5ML
SYRINGE (ML) INTRAVENOUS AS NEEDED
Status: DISCONTINUED | OUTPATIENT
Start: 2023-03-29 | End: 2023-03-29

## 2023-03-29 RX ORDER — DEXAMETHASONE SODIUM PHOSPHATE 10 MG/ML
INJECTION, SOLUTION INTRAMUSCULAR; INTRAVENOUS AS NEEDED
Status: DISCONTINUED | OUTPATIENT
Start: 2023-03-29 | End: 2023-03-29

## 2023-03-29 RX ORDER — ONDANSETRON 2 MG/ML
INJECTION INTRAMUSCULAR; INTRAVENOUS AS NEEDED
Status: DISCONTINUED | OUTPATIENT
Start: 2023-03-29 | End: 2023-03-29

## 2023-03-29 RX ORDER — ROCURONIUM BROMIDE 10 MG/ML
INJECTION, SOLUTION INTRAVENOUS AS NEEDED
Status: DISCONTINUED | OUTPATIENT
Start: 2023-03-29 | End: 2023-03-29

## 2023-03-29 RX ORDER — HEPARIN SODIUM 1000 [USP'U]/ML
2000 INJECTION, SOLUTION INTRAVENOUS; SUBCUTANEOUS EVERY 6 HOURS PRN
Status: DISCONTINUED | OUTPATIENT
Start: 2023-03-29 | End: 2023-03-31

## 2023-03-29 RX ORDER — FENTANYL CITRATE 50 UG/ML
INJECTION, SOLUTION INTRAMUSCULAR; INTRAVENOUS AS NEEDED
Status: DISCONTINUED | OUTPATIENT
Start: 2023-03-29 | End: 2023-03-29

## 2023-03-29 RX ORDER — HEPARIN SODIUM 10000 [USP'U]/100ML
3-20 INJECTION, SOLUTION INTRAVENOUS
Status: DISCONTINUED | OUTPATIENT
Start: 2023-03-29 | End: 2023-03-31

## 2023-03-29 RX ORDER — SODIUM CHLORIDE, SODIUM LACTATE, POTASSIUM CHLORIDE, CALCIUM CHLORIDE 600; 310; 30; 20 MG/100ML; MG/100ML; MG/100ML; MG/100ML
INJECTION, SOLUTION INTRAVENOUS CONTINUOUS PRN
Status: DISCONTINUED | OUTPATIENT
Start: 2023-03-29 | End: 2023-03-29

## 2023-03-29 RX ORDER — LIDOCAINE HYDROCHLORIDE 10 MG/ML
INJECTION, SOLUTION EPIDURAL; INFILTRATION; INTRACAUDAL; PERINEURAL AS NEEDED
Status: DISCONTINUED | OUTPATIENT
Start: 2023-03-29 | End: 2023-03-29

## 2023-03-29 RX ORDER — HEPARIN SODIUM 1000 [USP'U]/ML
4000 INJECTION, SOLUTION INTRAVENOUS; SUBCUTANEOUS EVERY 6 HOURS PRN
Status: DISCONTINUED | OUTPATIENT
Start: 2023-03-29 | End: 2023-03-31

## 2023-03-29 RX ORDER — DEXMEDETOMIDINE HYDROCHLORIDE 4 UG/ML
.1-.7 INJECTION, SOLUTION INTRAVENOUS
Status: DISCONTINUED | OUTPATIENT
Start: 2023-03-29 | End: 2023-03-30

## 2023-03-29 RX ADMIN — PROPOFOL 50 MCG/KG/MIN: 10 INJECTION, EMULSION INTRAVENOUS at 15:34

## 2023-03-29 RX ADMIN — ROCURONIUM BROMIDE 50 MG: 10 INJECTION, SOLUTION INTRAVENOUS at 11:36

## 2023-03-29 RX ADMIN — IPRATROPIUM BROMIDE 0.5 MG: 0.5 SOLUTION RESPIRATORY (INHALATION) at 19:52

## 2023-03-29 RX ADMIN — ONDANSETRON 4 MG: 2 INJECTION INTRAMUSCULAR; INTRAVENOUS at 11:40

## 2023-03-29 RX ADMIN — FENTANYL CITRATE 50 MCG: 50 INJECTION, SOLUTION INTRAMUSCULAR; INTRAVENOUS at 10:13

## 2023-03-29 RX ADMIN — DEXMEDETOMIDINE HCL 8 MCG: 100 INJECTION INTRAVENOUS at 12:25

## 2023-03-29 RX ADMIN — INSULIN GLARGINE 20 UNITS: 100 INJECTION, SOLUTION SUBCUTANEOUS at 21:07

## 2023-03-29 RX ADMIN — LEVALBUTEROL HYDROCHLORIDE 1.25 MG: 1.25 SOLUTION, CONCENTRATE RESPIRATORY (INHALATION) at 01:29

## 2023-03-29 RX ADMIN — CHLORHEXIDINE GLUCONATE 0.12% ORAL RINSE 15 ML: 1.2 LIQUID ORAL at 09:05

## 2023-03-29 RX ADMIN — DEXAMETHASONE SODIUM PHOSPHATE 10 MG: 10 INJECTION, SOLUTION INTRAMUSCULAR; INTRAVENOUS at 11:40

## 2023-03-29 RX ADMIN — INSULIN LISPRO 10 UNITS: 100 INJECTION, SOLUTION INTRAVENOUS; SUBCUTANEOUS at 17:43

## 2023-03-29 RX ADMIN — GLYCOPYRROLATE 0.2 MG: 0.2 INJECTION, SOLUTION INTRAMUSCULAR; INTRAVENOUS at 11:40

## 2023-03-29 RX ADMIN — PROPOFOL 200 MG: 10 INJECTION, EMULSION INTRAVENOUS at 11:31

## 2023-03-29 RX ADMIN — BUDESONIDE 0.5 MG: 0.5 INHALANT ORAL at 07:59

## 2023-03-29 RX ADMIN — ROCURONIUM BROMIDE 50 MG: 10 INJECTION INTRAVENOUS at 12:19

## 2023-03-29 RX ADMIN — HYDRALAZINE HYDROCHLORIDE 25 MG: 25 TABLET, FILM COATED ORAL at 05:14

## 2023-03-29 RX ADMIN — HYDROMORPHONE HYDROCHLORIDE 1 MG: 1 INJECTION, SOLUTION INTRAMUSCULAR; INTRAVENOUS; SUBCUTANEOUS at 15:33

## 2023-03-29 RX ADMIN — CEFAZOLIN 3000 MG: 10 INJECTION, POWDER, FOR SOLUTION INTRAVENOUS at 11:50

## 2023-03-29 RX ADMIN — FENTANYL CITRATE 100 MCG: 50 INJECTION, SOLUTION INTRAMUSCULAR; INTRAVENOUS at 14:00

## 2023-03-29 RX ADMIN — IPRATROPIUM BROMIDE 0.5 MG: 0.5 SOLUTION RESPIRATORY (INHALATION) at 14:00

## 2023-03-29 RX ADMIN — ASPIRIN 81 MG CHEWABLE TABLET 81 MG: 81 TABLET CHEWABLE at 09:04

## 2023-03-29 RX ADMIN — HYDROMORPHONE HYDROCHLORIDE 0.5 MG: 1 INJECTION, SOLUTION INTRAMUSCULAR; INTRAVENOUS; SUBCUTANEOUS at 12:15

## 2023-03-29 RX ADMIN — DEXMEDETOMIDINE HYDROCHLORIDE 0.2 MCG/KG/HR: 400 INJECTION INTRAVENOUS at 14:11

## 2023-03-29 RX ADMIN — LIDOCAINE HYDROCHLORIDE 50 MG: 10 INJECTION, SOLUTION EPIDURAL; INFILTRATION; INTRACAUDAL; PERINEURAL at 11:31

## 2023-03-29 RX ADMIN — INSULIN LISPRO 12 UNITS: 100 INJECTION, SOLUTION INTRAVENOUS; SUBCUTANEOUS at 21:10

## 2023-03-29 RX ADMIN — HEPARIN SODIUM 11.1 UNITS/KG/HR: 10000 INJECTION, SOLUTION INTRAVENOUS at 18:00

## 2023-03-29 RX ADMIN — ROPIVACAINE HYDROCHLORIDE 30 MG: 5 INJECTION, SOLUTION EPIDURAL; INFILTRATION; PERINEURAL at 10:20

## 2023-03-29 RX ADMIN — LEVALBUTEROL HYDROCHLORIDE 1.25 MG: 1.25 SOLUTION, CONCENTRATE RESPIRATORY (INHALATION) at 07:59

## 2023-03-29 RX ADMIN — MIDAZOLAM 1 MG: 1 INJECTION INTRAMUSCULAR; INTRAVENOUS at 09:57

## 2023-03-29 RX ADMIN — PREDNISONE 40 MG: 20 TABLET ORAL at 09:04

## 2023-03-29 RX ADMIN — LEVALBUTEROL HYDROCHLORIDE 1.25 MG: 1.25 SOLUTION, CONCENTRATE RESPIRATORY (INHALATION) at 19:52

## 2023-03-29 RX ADMIN — SUGAMMADEX 200 MG: 100 INJECTION, SOLUTION INTRAVENOUS at 13:54

## 2023-03-29 RX ADMIN — FENTANYL CITRATE 50 MCG: 50 INJECTION, SOLUTION INTRAMUSCULAR; INTRAVENOUS at 09:57

## 2023-03-29 RX ADMIN — ROPIVACAINE HYDROCHLORIDE 20 MG: 5 INJECTION, SOLUTION EPIDURAL; INFILTRATION; PERINEURAL at 10:10

## 2023-03-29 RX ADMIN — SODIUM CHLORIDE, SODIUM LACTATE, POTASSIUM CHLORIDE, AND CALCIUM CHLORIDE: .6; .31; .03; .02 INJECTION, SOLUTION INTRAVENOUS at 11:22

## 2023-03-29 RX ADMIN — DEXMEDETOMIDINE HCL 8 MCG: 100 INJECTION INTRAVENOUS at 12:44

## 2023-03-29 RX ADMIN — MIDAZOLAM 1 MG: 1 INJECTION INTRAMUSCULAR; INTRAVENOUS at 10:06

## 2023-03-29 RX ADMIN — BUDESONIDE 0.5 MG: 0.5 INHALANT ORAL at 19:52

## 2023-03-29 RX ADMIN — CHLORHEXIDINE GLUCONATE 0.12% ORAL RINSE 15 ML: 1.2 LIQUID ORAL at 21:07

## 2023-03-29 RX ADMIN — NICOTINE 21 MG: 21 PATCH, EXTENDED RELEASE TRANSDERMAL at 09:06

## 2023-03-29 RX ADMIN — MELATONIN 6 MG: at 21:06

## 2023-03-29 RX ADMIN — Medication 200 MG: at 11:31

## 2023-03-29 RX ADMIN — FENTANYL CITRATE 100 MCG: 50 INJECTION, SOLUTION INTRAMUSCULAR; INTRAVENOUS at 13:15

## 2023-03-29 RX ADMIN — PROPOFOL 100 MCG/KG/MIN: 10 INJECTION, EMULSION INTRAVENOUS at 13:32

## 2023-03-29 RX ADMIN — DEXMEDETOMIDINE HCL 4 MCG: 100 INJECTION INTRAVENOUS at 11:55

## 2023-03-29 RX ADMIN — IPRATROPIUM BROMIDE 0.5 MG: 0.5 SOLUTION RESPIRATORY (INHALATION) at 01:29

## 2023-03-29 RX ADMIN — IPRATROPIUM BROMIDE 0.5 MG: 0.5 SOLUTION RESPIRATORY (INHALATION) at 07:59

## 2023-03-29 RX ADMIN — ROCURONIUM BROMIDE 50 MG: 10 INJECTION, SOLUTION INTRAVENOUS at 13:13

## 2023-03-29 RX ADMIN — QUETIAPINE FUMARATE 200 MG: 100 TABLET ORAL at 21:06

## 2023-03-29 RX ADMIN — ALBUTEROL SULFATE 2.5 MG: 2.5 SOLUTION RESPIRATORY (INHALATION) at 10:19

## 2023-03-29 RX ADMIN — LEVALBUTEROL HYDROCHLORIDE 1.25 MG: 1.25 SOLUTION, CONCENTRATE RESPIRATORY (INHALATION) at 14:00

## 2023-03-29 RX ADMIN — HYDROMORPHONE HYDROCHLORIDE 0.5 MG: 1 INJECTION, SOLUTION INTRAMUSCULAR; INTRAVENOUS; SUBCUTANEOUS at 12:09

## 2023-03-29 RX ADMIN — HYDRALAZINE HYDROCHLORIDE 25 MG: 25 TABLET, FILM COATED ORAL at 21:06

## 2023-03-29 NOTE — PROGRESS NOTES
Alonso Segovia underwent L BKA today with vascular surgery  Alonso Segovia is back to MICU around 1:45 pm, sedated and intubated, in Propofol drip  Anesthesia team helped reversed the neuromuscular blockade, patient coughed once after, still intubated      On exam, lungs CTAB , RASS -3 , L BKA well dressed       Vitals:    03/29/23 0700 03/29/23 0800 03/29/23 0900 03/29/23 1351   BP: 134/71 153/84 157/84    BP Location:  Left arm     Pulse: 88 92 98    Resp: (!) 28 (!) 25 20    Temp:  97 8 °F (36 6 °C)     TempSrc:  Oral     SpO2: 97% 99% 97% 100%   Weight:       Height:            PLAN   - Start Precedex Drip   - Wean off Propofol as appropriate   - RT at bed side, giving Xopenex Nebs, patient received Pulmicort and her Prednisone already pre-op   - 100 mg IV Fentanyl given   - cardiopulmonary monitoring , wean protocol for potential extubation as appropriate

## 2023-03-29 NOTE — ANESTHESIA PROCEDURE NOTES
Peripheral Block    Patient location during procedure: holding area  Start time: 3/29/2023 10:20 AM  Reason for block: at surgeon's request and post-op pain management  Staffing  Performed: Anesthesiologist   Anesthesiologist: Heather Bella MD  Preanesthetic Checklist  Completed: patient identified, IV checked, site marked, risks and benefits discussed, surgical consent, monitors and equipment checked, pre-op evaluation and timeout performed  Peripheral Block  Patient position: supine  Prep: ChloraPrep  Patient monitoring: continuous pulse ox and frequent blood pressure checks  Block type: popliteal  Laterality: left  Injection technique: single-shot  Procedures: ultrasound guided, Ultrasound guidance required for the procedure to increase accuracy and safety of medication placement and decrease risk of complications    Needle  Needle localization: ultrasound guidance and anatomical landmarks  Test dose: negative  Assessment  Injection assessment: incremental injection, local visualized surrounding nerve on ultrasound, no paresthesia on injection, negative aspiration for heme and transient paresthesias  Paresthesia pain: none  Heart rate change: no  Slow fractionated injection: yes  Post-procedure:  site cleaned  patient tolerated the procedure well with no immediate complications

## 2023-03-29 NOTE — ANESTHESIA PREPROCEDURE EVALUATION
Procedure:  AMPUTATION ABOVE KNEE (AKA) (Left: Leg Upper)    Relevant Problems   CARDIO   (+) Essential hypertension      ENDO   (+) Type 2 diabetes mellitus with diabetic polyneuropathy, with long-term current use of insulin (HCC)      MUSCULOSKELETAL   (+) Bursitis of left knee      NEURO/PSYCH   (+) Headache   (+) Paresthesia      PULMONARY   (+) COPD (chronic obstructive pulmonary disease) (HCC)        Physical Exam    Airway    Mallampati score: II  TM Distance: <3 FB  Neck ROM: limited     Dental   No notable dental hx     Cardiovascular  Cardiovascular exam normal    Pulmonary  Pulmonary exam normal     Other Findings        Anesthesia Plan  ASA Score- 4     Anesthesia Type- general with ASA Monitors  Additional Monitors: arterial line  Airway Plan: ETT  Comment: Severe asthmatic  May require prolonged intubation postop  Continue nebs/steroids  Femoral and popliteal blocks to be placed by APS Dr Sherryle Corolla preoperatively  HTN control- poss Cardene  Plan Factors-Exercise tolerance (METS): <4 METS  Chart reviewed  EKG reviewed  Imaging results reviewed  Existing labs reviewed  Patient summary reviewed  Patient is a current smoker  Patient instructed to abstain from smoking on day of procedure  Patient did not smoke on day of surgery  Obstructive sleep apnea risk education given perioperatively  Induction- intravenous  Postoperative Plan- Plan for postoperative opioid use  Planned trial extubation    Informed Consent- Anesthetic plan and risks discussed with patient  I personally reviewed this patient with the CRNA  Discussed and agreed on the Anesthesia Plan with the CRNA  Angelic Limon

## 2023-03-29 NOTE — OCCUPATIONAL THERAPY NOTE
OT CANCEL NOTE    OT orders received  Chart reviewed  Pt is pending OR today for L AKA  Will hold initial OT evaluation  Will continue to follow pt on caseload and see pt when medically stable and as clinically appropriate, post-op         03/29/23 0806   OT Last Visit   OT Visit Date 03/29/23   Note Type   Note type Cancelled Session   Cancel Reasons Patient to operating room         Karyna Faith MS, OTR/L

## 2023-03-29 NOTE — PROGRESS NOTES
"[facility]  Progress Note  Name: Liang Burns  MRN: [id number]  Unit/Bed#: MICU 04 I Date of Admission: 3/24/2023   Date of Service: 3/29/2023 I Hospital Day: 5    Assessment/Plan   Atherosclerosis of left leg [facility])  Assessment & Plan  Assessment:  39 y/o F w/ PMHx of obesity, DM, COPD, PAD with h/o  L SFA PTA/stent 8/11/2021, subsequent stent thrombectomy 8/25/2021 and ultimate  L CFA to BK-pop bypass with in situ GSV 9/14/21     s/p LLE Agram which revealed an occluded left fem-BK pop bypass, no outflow into the foot 3/24     Postoperative course was complicated by prolonged intubation for bronchospasm, now extubated      Plan:  Left AKA today  NPO   Heparin gtt; Hold Heparin gtt 6H prior to OR; prior to discharge will need Xarelto VTE dosing  Cardiology- acceptable risk  APS following for pain control, blocks  PM&R following  Continue ASA  Pulmonary toilet  Remainder of care per ICU            Subjective:  Pt resting comfortably, agreeable for surgery    Vitals:  /86   Pulse 82   Temp 98 5 °F (36 9 °C) (Oral)   Resp (!) 24   Ht 5' 1\" (1 549 m)   Wt 127 kg (280 lb 6 8 oz)   LMP  (LMP Unknown)   SpO2 96%   BMI 52 99 kg/m²     I/Os:  I/O last 3 completed shifts: In: 2857 3 [P O :2400; I V :457 3]  Out: 4662 [SPOGF:3952]  I/O this shift:   In: 503 5 [P O :340; I V :163 5]  Out: 900 [Urine:900]    Lab Results and Cultures:   Lab Results   Component Value Date    WBC 15 80 (H) 03/29/2023    HGB 11 0 (L) 03/29/2023    HCT 31 2 (L) 03/29/2023    MCV 74 (L) 03/29/2023     03/29/2023     Lab Results   Component Value Date    GLUCOSE 177 (H) 09/14/2021    CALCIUM 8 8 03/29/2023    K 3 7 03/29/2023    CO2 29 03/29/2023     03/29/2023    BUN 18 03/29/2023    CREATININE 0 42 (L) 03/29/2023     Lab Results   Component Value Date    INR 1 01 03/27/2023    INR 0 99 03/24/2023    INR 0 98 09/07/2022    PROTIME 13 6 03/27/2023    PROTIME 13 2 03/24/2023    " PROTIME 13 3 03/24/2023        Blood Culture:   Lab Results   Component Value Date    BLOODCX No Growth After 5 Days  10/02/2021    BLOODCX No Growth After 5 Days   10/02/2021   ,   Urinalysis:   Lab Results   Component Value Date    COLORU Yellow 09/23/2021    CLARITYU Clear 09/23/2021    SPECGRAV 1 025 09/23/2021    PHUR 6 0 09/23/2021    PHUR 7 5 08/07/2020    LEUKOCYTESUR Negative 09/23/2021    NITRITE Negative 09/23/2021    GLUCOSEU 3+ (A) 09/23/2021    KETONESU Trace (A) 09/23/2021    BILIRUBINUR Negative 09/23/2021    BLOODU Trace-Intact (A) 09/23/2021   ,   Urine Culture: No results found for: URINECX,   Wound Culure: No results found for: WOUNDCULT    Medications:  Current Facility-Administered Medications   Medication Dose Route Frequency   • acetaminophen (TYLENOL) tablet 650 mg  650 mg Oral Q4H PRN   • albuterol (PROVENTIL HFA,VENTOLIN HFA) inhaler 2 puff  2 puff Inhalation Q4H PRN   • aspirin chewable tablet 81 mg  81 mg Oral Daily   • atorvastatin (LIPITOR) tablet 40 mg  40 mg Oral Daily With Dinner   • bisacodyl (DULCOLAX) rectal suppository 10 mg  10 mg Rectal Daily   • budesonide (PULMICORT) inhalation solution 0 5 mg  0 5 mg Nebulization Q12H   • chlorhexidine (PERIDEX) 0 12 % oral rinse 15 mL  15 mL Mouth/Throat Q12H [address]   • chlorhexidine (PERIDEX) 0 12 % oral rinse 15 mL  15 mL Swish & Spit Once   • gabapentin (NEURONTIN) oral solution 300 mg  300 mg Oral BID   • heparin (porcine) injection 10,000 Units  10,000 Units Intravenous Q6H PRN   • heparin (porcine) injection 5,000 Units  5,000 Units Intravenous Q6H PRN   • hydrALAZINE (APRESOLINE) injection 10 mg  10 mg Intravenous Q6H PRN   • hydrALAZINE (APRESOLINE) tablet 25 mg  25 mg Oral Q8H [address]   • HYDROmorphone (DILAUDID) injection 1 mg  1 mg Intravenous Q4H PRN   • insulin glargine (LANTUS) subcutaneous injection 20 Units 0 2 mL  20 Units Subcutaneous Q12H [address]   • insulin lispro (HumaLOG) 100 units/mL subcutaneous injection 2-12 Units  2-12 Units Subcutaneous TID AC   • insulin lispro (HumaLOG) 100 units/mL subcutaneous injection 2-12 Units  2-12 Units Subcutaneous HS   • ipratropium (ATROVENT) 0 02 % inhalation solution 0 5 mg  0 5 mg Nebulization Q6H   • levalbuterol (XOPENEX) inhalation solution 1 25 mg  1 25 mg Nebulization Q6H   • melatonin tablet 6 mg  6 mg Oral HS   • nicotine (NICODERM CQ) 21 mg/24 hr TD 24 hr patch 21 mg  21 mg Transdermal Daily   • oxyCODONE (ROXICODONE) immediate release tablet 10 mg  10 mg Oral Q4H PRN   • oxyCODONE (ROXICODONE) IR tablet 5 mg  5 mg Oral Q4H PRN   • polyethylene glycol (MIRALAX) packet 17 g  17 g Oral Daily   • predniSONE tablet 40 mg  40 mg Oral Daily   • QUEtiapine (SEROquel) tablet 200 mg  200 mg Oral HS   • senna-docusate sodium (SENOKOT S) 8 6-50 mg per tablet 2 tablet  2 tablet Oral HS   • simethicone (MYLICON) chewable tablet 80 mg  80 mg Oral Q6H PRN       Physical Exam:    General appearance: alert and oriented, in no acute distress  Lungs: clear to auscultation bilaterally  Heart: S1, S2 normal  Abdomen: soft, non-tender; bowel sounds normal; no masses,  no organomegaly  Extremities: Left foot cool, diminshed M/S           Cheyanne Mejía PA-C  3/29/2023

## 2023-03-29 NOTE — CONSULTS
Please see completed consult done on 3/27/2023  Patient underwent left BKA 3/29/2023 and is currently intubated and sedated  PM&R following

## 2023-03-29 NOTE — ANESTHESIA POSTPROCEDURE EVALUATION
Post-Op Assessment Note    CV Status:  Stable  Pain Score: 0    Pain management: adequate     Mental Status:  Unresponsive   Hydration Status:  Euvolemic and stable   PONV Controlled:  Controlled   Airway Patency:  Patent and adequate  Airway: intubated      Post Op Vitals Reviewed: Yes      Staff: CRNA   Comments: patient transferred to ICU on Prop gtt, O2 via ETT, without events, report given to ICU @ bedside  vent placed by RCP, Paralytic reversed with sugammadex  no further interventions @ this time, will cont  to monitor  No notable events documented      BP   109/68   Temp      Pulse  102   Resp   12   SpO2   100

## 2023-03-29 NOTE — ARC ADMISSION
ARC continues to follow patient's case at this time, monitoring for medical stability and functional progress  Noted that patient is currently in OR for left AKA  Will need postop PT/OT evaluations as appropriate

## 2023-03-29 NOTE — OP NOTE
OPERATIVE REPORT  PATIENT NAME: Kaz Mishra    :  1985  MRN: [id number]  Pt Location: [facility] OR ROOM 02    SURGERY DATE: 3/29/2023    Surgeon(s) and Role:     * Dano Ventura MD - Primary     * Shey Maxwell MD - Reida Russian Son Maddyyair Mijares MD - Assisting    Preop Diagnosis:  PAD (peripheral artery disease) ([address] ) [I73 9]  Pain of left lower extremity [M79 605]    Post-Op Diagnosis Codes:     * PAD (peripheral artery disease) ([address] ) [I73 9]     * Pain of left lower extremity [M79 605]    Procedure(s):  Left - AMPUTATION ABOVE KNEE (AKA)    Specimen(s):  ID Type Source Tests Collected by Time Destination   1 : left aka Tissue Leg, Left TISSUE EXAM Dano Ventura MD 3/29/2023 1219        Estimated Blood Loss:   Minimal    Drains:  Urethral Catheter Latex 16 Fr  (Active)   Collection Container Standard drainage bag 23 1155   Number of days: 0       Anesthesia Type:   General    Operative Indications:  PAD (peripheral artery disease) ([facility] [address] ) [I73 9]  Pain of left lower extremity [M79 605]    Ms Jessica Hinkle is a 45yo female with PAD/LLE CLTI s/p left fem-pop bypass now occluded with nonsalvageable left lower extremity  Plan for left AKA  All risks and benefits of the procedure were discussed with the patient and informed consent was obtained  Operative Findings:  Healthy subcutaneous tissues and viable/contractile muscle at level of amputation  Left AKA performed at mid femur    Complications:   None    Procedure and Technique:  The patient was brought to the operating room  The patient was placed in the supine position  After anesthesia monitoring lines were placed, induction of anesthesia was performed  Time out with site verification of the left leg was performed  The patient was prepped and draped in a sterile manner  The patient had a preoperative antibiotic administered within one hour of the skin incision      A fishmouth skin incision was made and carried through the soft tissues and muscles down to the left femur  The occluded left leg bypass graft was identified and divided/ligated with 3-0 silk suture  Satisfactory bleeding and tissue color were noted  The femur was transected with an oscillating saw ensuring the bone was cut to a smooth surface  Posterior muscle groups were divided and the specimen was delivered from the operative field  Additional hemostasis was achieved with cautery or ligation of individual vessels  The nerve was crushed and ligated with 3-0 vicryl  The wound was irrigated with saline  Fascia was re-approximated with interrupted 0- Vicryl  The dermis was re-approximated with interrupted 3-0 vicryl  The skin was re-approximated with skin staples and 2-0 nylon vertical mattress sutures  The left AKA stump was dressed with xeroform, 4x4 gauze, ABD pads, kerlix, and ACE wrap  All sponge, needle and instrument counts were correct at the end of the case  The patient tolerated the procedure well  She was kept intubated due to history of bronchospasm post-extubation after most recent procedure with plan for transfer to ICU for extubation         I was present for the entire procedure    Patient Disposition:  Critical Care Unit and hemodynamically stable        SIGNATURE: Silvio Hopkins MD  DATE: March 29, 2023  TIME: 1:18 PM

## 2023-03-29 NOTE — QUICK NOTE
Met and spoke with the patient's spouse and friend at bedside, all their questions answered appropriately, updated on current status and plan as noted in previous post-op note, weaning sedation as appropriate for preparation to extubation when appropriate  CC team will continue to monitor and update

## 2023-03-29 NOTE — QUICK NOTE
"Post Op Check Note - Surgery Resident  Melvin Rojas 40 y o  female MRN: [id number]  Unit/Bed#: MICU 04 Encounter: [id number]    ASSESSMENT:  Melvin Rojas is a 40 y o  female who is status post left AKA  Plan to change dressing on Friday, 3/31  Subjective: Patient comfortable in bed with wife bedside, eating dinner  States no pain  Wife is pleased with outcome, stating Dr Precious Sanders is \"amazing\"  Physical Exam:  GEN: NAD  CV: RRR  Lung: Normal effort  Ab: Soft, NT/ND  Neuro: A+Ox3  Incisions: dressing c/d/i    Blood pressure 117/59, pulse 94, temperature 98 9 °F (37 2 °C), temperature source Oral, resp  rate 16, height 5' 1\" (1 549 m), weight 127 kg (280 lb 6 8 oz), SpO2 100 %, not currently breastfeeding  ,Body mass index is 52 99 kg/m²        Intake/Output Summary (Last 24 hours) at 3/29/2023 1829  Last data filed at 3/29/2023 1600  Gross per 24 hour   Intake 1303 5 ml   Output 2000 ml   Net -696 5 ml       Invasive Devices     Peripherally Inserted Central Catheter Line  Duration           PICC Line 08/23/91 Right Basilic 4 days          Drain  Duration           Urethral Catheter Latex 16 Fr  <1 day                VTE Pharmacologic Prophylaxis: Reason for no pharmacologic prophylaxis fully anticoagulated on heparin drip due to clotting disorder            "

## 2023-03-29 NOTE — ASSESSMENT & PLAN NOTE
Assessment:  41 y/o F w/ PMHx of obesity, DM, COPD, PAD with h/o  L SFA PTA/stent 8/11/2021, subsequent stent thrombectomy 8/25/2021 and ultimate  L CFA to BK-pop bypass with in situ GSV 9/14/21     s/p LLE Agram which revealed an occluded left fem-BK pop bypass, no outflow into the foot 3/24     Postoperative course was complicated by prolonged intubation for bronchospasm, now extubated      Plan:  Left AKA today  NPO   Heparin gtt; Hold Heparin gtt 6H prior to OR; prior to discharge will need Xarelto VTE dosing  Cardiology- acceptable risk  APS following for pain control, blocks  PM&R following  Continue ASA  Pulmonary toilet  Remainder of care per ICU

## 2023-03-29 NOTE — ANESTHESIA PROCEDURE NOTES
Peripheral Block    Patient location during procedure: holding area  Start time: 3/29/2023 10:10 AM  Reason for block: at surgeon's request and post-op pain management  Staffing  Performed: Anesthesiologist   Anesthesiologist: Lynsey Alanis MD  Preanesthetic Checklist  Completed: patient identified, IV checked, site marked, risks and benefits discussed, surgical consent, monitors and equipment checked, pre-op evaluation and timeout performed  Peripheral Block  Patient position: supine  Prep: ChloraPrep  Patient monitoring: continuous pulse ox and frequent blood pressure checks  Block type: adductor canal block  Laterality: left  Injection technique: single-shot  Procedures: ultrasound guided, Ultrasound guidance required for the procedure to increase accuracy and safety of medication placement and decrease risk of complications  Needle  Needle localization: ultrasound guidance and anatomical landmarks  Test dose: negative  Assessment  Injection assessment: incremental injection, local visualized surrounding nerve on ultrasound, negative aspiration for heme and no paresthesia on injection  Paresthesia pain: none  Heart rate change: no  Slow fractionated injection: yes  Post-procedure:  site cleaned  patient tolerated the procedure well with no immediate complications  Additional Notes  Pannus taped back for attempted US visualization of femoral nerve  Unsuccessful due to groin adipose tissue  Adductor canal nerve block performed instead

## 2023-03-29 NOTE — PROGRESS NOTES
[facility]  Critical Care Progress Note  Date of Service: 3/29/2023  Hospital Day: 5  Name: Analia Bang  MRN: [id number]  Unit/Bed#: MICU 04    Assessment/Plan   Neuro:   · Diagnosis: Bipolar Disorder  · Plan:   · Seroquel 200 QHS  · Diagnosis: Nicotine Addiction  · Plan:  · Nicoderm patch  · Diagnosis Pain control  · Plan:  · APS following  · S/p LLE nerve block  · Gabapentin 300 BID  · Oxycodone 5/10 PRNs      CV:   · Diagnosis: PAD s/p L Fem-BK pop bypass/stent w/ restenosis and occlusion  · Plan:   · OR today for AKA (on schedule for 1120)  · Cont ASA  · Cont atorvastatin  · NPO  · Holding clopidogrel, BB, ACE, ARB per card recs  · Hep gtt held @ 0500  · Diagnosis: HTN  · Plan:  · Hydralazine decreased to 25 TID  · PRN hydralazine  · Other antihypertensives held per above      Pulm:  · Diagnosis: COPD  · Plan:   · Closely monitor airway post-operatively due to need for reintubation 2/2 bronchospasm this hospitalization  · Maintain Sats > 90%  · Prednisone  · Nebs q6      GI:   · Diagnosis: GERD  · Plan:   · Protonix  · Bowel PPX:  · Dulcolax  · Miralax  · Senna  · Simethicone      :   · Urinary retention protocol  · Monitor I/Os, Cr      F/E/N:   · F: None  · E: trend/replete PRN  · N: NPO for OR today      Heme/Onc:   · Diagnosis: Recurrent occlusion of LLE bypass graft and stent  · Plan:   · Heme-onc consulted  · F/u outpt  · SCDs  · Hep gtt held for OR, resume per vascular recs      Endo:   · Diagnosis: T2DM  · Plan:   · Lantux 20 BID  · SSI  · Goal -180      ID:   No active issues    MSK/Skin:   · Diagnosis: Ambulatory dysfunction  · Plan:   · Post-op PT/OT when able      Disposition: Critical care    ICU Core Measures     A: Assess, Prevent, and Manage Pain · Has pain been assessed? Yes  · Need for changes to pain regimen?  No   B: Both SAT/SAT  · N/A   C: Choice of Sedation · RASS Goal: 0 Alert and Calm  · Need for changes to sedation or analgesia regimen? No   D: Delirium · CAM-ICU: Negative   E: Early Mobility  · Plan for early mobility? No   F: Family Engagement · Plan for family engagement today? Yes       Review of Invasive Devices:      Central access plan: none      Prophylaxis:  VTE VTE covered by:  heparin (porcine), Intravenous, 12 Units/kg/hr at 03/28/23 2350  heparin (porcine), Intravenous  heparin (porcine), Intravenous       Stress Ulcer  not ordered          Subjective   HPI/24hr events: Patient monitored with no acute events  Hydralazine dose decreased yesterday  NPO for OR today  Scheduled @ 1120  Patient anxious regarding OR  Continues to have intermittent abdominal discomfort and calf pains  Review of Systems   All other systems reviewed and are negative  Objective                            Vitals I/O      Most Recent Min/Max in 24hrs   Temp 97 5 °F (36 4 °C) Temp  Min: 97 5 °F (36 4 °C)  Max: 98 7 °F (37 1 °C)   Pulse 84 Pulse  Min: 84  Max: 112   Resp (!) 25 Resp  Min: 19  Max: 36   /75 BP  Min: 86/51  Max: 154/83   O2 Sat 97 % SpO2  Min: 96 %  Max: 100 %      Intake/Output Summary (Last 24 hours) at 3/29/2023 0053  Last data filed at 3/28/2023 2201  Gross per 24 hour   Intake 1170 01 ml   Output 2562 ml   Net -1391 99 ml         Diet NPO; Sips with meds     Invasive Monitoring Physical exam    Physical Exam  Vitals and nursing note reviewed  Constitutional:       General: She is not in acute distress  Appearance: She is well-developed  HENT:      Head: Normocephalic and atraumatic  Eyes:      Conjunctiva/sclera: Conjunctivae normal    Cardiovascular:      Rate and Rhythm: Normal rate and regular rhythm  Heart sounds: No murmur heard  Comments: No palpable LLE DP/PTs  Pulmonary:      Effort: Pulmonary effort is normal  No respiratory distress  Breath sounds: Normal breath sounds  Abdominal:      Palpations: Abdomen is soft  Tenderness: There is no abdominal tenderness  Musculoskeletal:         General: No swelling  Cervical back: Neck supple  Skin:     General: Skin is dry  Capillary Refill: Capillary refill takes less than 2 seconds  Comments: LLE foot cold  Neurological:      Mental Status: She is alert and oriented to person, place, and time  Psychiatric:         Mood and Affect: Mood normal             Diagnostic Studies      EKG: NSR  Imaging:  I have personally reviewed pertinent reports         Medications:  Scheduled PRN   aspirin, 81 mg, Daily  atorvastatin, 40 mg, Daily With Dinner  bisacodyl, 10 mg, Daily  budesonide, 0 5 mg, Q12H  chlorhexidine, 15 mL, Q12H ABDULKADIR  chlorhexidine, 15 mL, Once  gabapentin, 300 mg, BID  hydrALAZINE, 25 mg, Q8H ABDULKADIR  insulin glargine, 20 Units, Q12H ABDULKADIR  insulin lispro, 2-12 Units, TID AC  insulin lispro, 2-12 Units, HS  ipratropium, 0 5 mg, Q6H  levalbuterol, 1 25 mg, Q6H  melatonin, 6 mg, HS  nicotine, 21 mg, Daily  polyethylene glycol, 17 g, Daily  predniSONE, 40 mg, Daily  QUEtiapine, 200 mg, HS  senna-docusate sodium, 2 tablet, HS      acetaminophen, 650 mg, Q4H PRN  albuterol, 2 puff, Q4H PRN  heparin (porcine), 10,000 Units, Q6H PRN  heparin (porcine), 5,000 Units, Q6H PRN  hydrALAZINE, 10 mg, Q6H PRN  HYDROmorphone, 1 mg, Q4H PRN  oxyCODONE, 10 mg, Q4H PRN  oxyCODONE, 5 mg, Q4H PRN  simethicone, 80 mg, Q6H PRN       Continuous    heparin (porcine), 3-30 Units/kg/hr (Order-Specific), Last Rate: 12 Units/kg/hr (03/28/23 2350)         Labs:    CBC    Recent Labs     03/27/23 0429 03/28/23  0556   WBC 17 93* 13 99*   HGB 11 9 10 7*   HCT 33 5* 30 3*    240     BMP    Recent Labs     03/27/23  0429 03/28/23  0556   SODIUM 136 140   K 3 9 3 3*    107   CO2 25 30   AGAP 3* 3*   BUN 16 17   CREATININE 0 58* 0 38*   CALCIUM 7 8* 7 6*       Coags    Recent Labs     03/27/23  1609 03/27/23  2215 03/28/23  1620 03/28/23  2257   INR 1 01  --   --   --    PTT 26   < > 86* 87*    < > = values in this interval not displayed          Additional Electrolytes  Recent Labs     03/27/23 0429 03/28/23  0556   MG 2 4 2 3   PHOS 4 0 4 0          Blood Gas    No recent results  No recent results LFTs  Recent Labs     03/27/23 0429 03/28/23  0556   ALT 58 55   * 134*   ALKPHOS 98 80   ALB 2 5* 2 0*   TBILI 0 24 0 42       Infectious  No recent results  Glucose  Recent Labs     03/27/23  0429 03/28/23  0556   GLUC 237* 80                   Willy Rivera MD

## 2023-03-30 ENCOUNTER — DOCUMENTATION (OUTPATIENT)
Dept: VASCULAR SURGERY | Facility: CLINIC | Age: 38
End: 2023-03-30

## 2023-03-30 PROBLEM — Z89.512 S/P BKA (BELOW KNEE AMPUTATION) UNILATERAL, LEFT (HCC): Status: ACTIVE | Noted: 2021-08-23

## 2023-03-30 LAB
ABO GROUP BLD BPU: NORMAL
ABO GROUP BLD BPU: NORMAL
ALBUMIN SERPL BCP-MCNC: 2 G/DL (ref 3.5–5)
ALP SERPL-CCNC: 83 U/L (ref 46–116)
ALT SERPL W P-5'-P-CCNC: 69 U/L (ref 12–78)
ANION GAP SERPL CALCULATED.3IONS-SCNC: 5 MMOL/L (ref 4–13)
APTT PPP: 49 SECONDS (ref 23–37)
APTT PPP: 53 SECONDS (ref 23–37)
APTT PPP: 67 SECONDS (ref 23–37)
AST SERPL W P-5'-P-CCNC: 58 U/L (ref 5–45)
BILIRUB SERPL-MCNC: 0.32 MG/DL (ref 0.2–1)
BPU ID: NORMAL
BPU ID: NORMAL
BUN SERPL-MCNC: 16 MG/DL (ref 5–25)
CALCIUM ALBUM COR SERPL-MCNC: 9.7 MG/DL (ref 8.3–10.1)
CALCIUM SERPL-MCNC: 8.1 MG/DL (ref 8.3–10.1)
CHLORIDE SERPL-SCNC: 103 MMOL/L (ref 96–108)
CO2 SERPL-SCNC: 28 MMOL/L (ref 21–32)
CREAT SERPL-MCNC: 0.44 MG/DL (ref 0.6–1.3)
CROSSMATCH: NORMAL
CROSSMATCH: NORMAL
ERYTHROCYTE [DISTWIDTH] IN BLOOD BY AUTOMATED COUNT: 15.4 % (ref 11.6–15.1)
GFR SERPL CREATININE-BSD FRML MDRD: 130 ML/MIN/1.73SQ M
GLUCOSE SERPL-MCNC: 196 MG/DL (ref 65–140)
GLUCOSE SERPL-MCNC: 197 MG/DL (ref 65–140)
GLUCOSE SERPL-MCNC: 275 MG/DL (ref 65–140)
GLUCOSE SERPL-MCNC: 299 MG/DL (ref 65–140)
GLUCOSE SERPL-MCNC: 349 MG/DL (ref 65–140)
HCT VFR BLD AUTO: 26.4 % (ref 34.8–46.1)
HGB BLD-MCNC: 9.3 G/DL (ref 11.5–15.4)
MCH RBC QN AUTO: 25.9 PG (ref 26.8–34.3)
MCHC RBC AUTO-ENTMCNC: 35.2 G/DL (ref 31.4–37.4)
MCV RBC AUTO: 74 FL (ref 82–98)
PLATELET # BLD AUTO: 276 THOUSANDS/UL (ref 149–390)
PMV BLD AUTO: 10.2 FL (ref 8.9–12.7)
POTASSIUM SERPL-SCNC: 3.7 MMOL/L (ref 3.5–5.3)
PROT SERPL-MCNC: 5.7 G/DL (ref 6.4–8.4)
RBC # BLD AUTO: 3.59 MILLION/UL (ref 3.81–5.12)
SODIUM SERPL-SCNC: 136 MMOL/L (ref 135–147)
UNIT DISPENSE STATUS: NORMAL
UNIT DISPENSE STATUS: NORMAL
UNIT PRODUCT CODE: NORMAL
UNIT PRODUCT CODE: NORMAL
UNIT PRODUCT VOLUME: 350 ML
UNIT PRODUCT VOLUME: 350 ML
UNIT RH: NORMAL
UNIT RH: NORMAL
WBC # BLD AUTO: 18.34 THOUSAND/UL (ref 4.31–10.16)

## 2023-03-30 RX ORDER — PREDNISONE 10 MG/1
10 TABLET ORAL DAILY
Status: COMPLETED | OUTPATIENT
Start: 2023-04-01 | End: 2023-04-02

## 2023-03-30 RX ORDER — PREDNISONE 20 MG/1
20 TABLET ORAL DAILY
Status: DISCONTINUED | OUTPATIENT
Start: 2023-03-30 | End: 2023-03-30

## 2023-03-30 RX ORDER — PREDNISONE 20 MG/1
20 TABLET ORAL DAILY
Status: COMPLETED | OUTPATIENT
Start: 2023-03-30 | End: 2023-03-31

## 2023-03-30 RX ORDER — ACETAMINOPHEN 325 MG/1
975 TABLET ORAL EVERY 8 HOURS SCHEDULED
Status: DISPENSED | OUTPATIENT
Start: 2023-03-30

## 2023-03-30 RX ORDER — TOPIRAMATE 25 MG/1
25 TABLET ORAL 2 TIMES DAILY
Status: DISPENSED | OUTPATIENT
Start: 2023-03-30

## 2023-03-30 RX ORDER — FLUTICASONE FUROATE AND VILANTEROL 200; 25 UG/1; UG/1
1 POWDER RESPIRATORY (INHALATION) DAILY
Status: DISPENSED | OUTPATIENT
Start: 2023-03-30

## 2023-03-30 RX ORDER — METHOCARBAMOL 500 MG/1
500 TABLET, FILM COATED ORAL EVERY 6 HOURS SCHEDULED
Status: DISPENSED | OUTPATIENT
Start: 2023-03-30

## 2023-03-30 RX ORDER — GABAPENTIN 300 MG/1
300 CAPSULE ORAL 2 TIMES DAILY
Status: DISPENSED | OUTPATIENT
Start: 2023-03-30

## 2023-03-30 RX ORDER — INSULIN GLARGINE 100 [IU]/ML
25 INJECTION, SOLUTION SUBCUTANEOUS EVERY 12 HOURS SCHEDULED
Status: DISCONTINUED | OUTPATIENT
Start: 2023-03-30 | End: 2023-03-31

## 2023-03-30 RX ORDER — PRAZOSIN HYDROCHLORIDE 1 MG/1
1 CAPSULE ORAL
Status: DISPENSED | OUTPATIENT
Start: 2023-03-30

## 2023-03-30 RX ADMIN — HYDROMORPHONE HYDROCHLORIDE 1 MG: 1 INJECTION, SOLUTION INTRAMUSCULAR; INTRAVENOUS; SUBCUTANEOUS at 15:55

## 2023-03-30 RX ADMIN — TOPIRAMATE 25 MG: 25 TABLET, FILM COATED ORAL at 10:54

## 2023-03-30 RX ADMIN — LEVALBUTEROL HYDROCHLORIDE 1.25 MG: 1.25 SOLUTION, CONCENTRATE RESPIRATORY (INHALATION) at 13:16

## 2023-03-30 RX ADMIN — IPRATROPIUM BROMIDE 0.5 MG: 0.5 SOLUTION RESPIRATORY (INHALATION) at 01:28

## 2023-03-30 RX ADMIN — GABAPENTIN 300 MG: 300 CAPSULE ORAL at 16:59

## 2023-03-30 RX ADMIN — HYDRALAZINE HYDROCHLORIDE 25 MG: 25 TABLET, FILM COATED ORAL at 06:11

## 2023-03-30 RX ADMIN — LEVALBUTEROL HYDROCHLORIDE 1.25 MG: 1.25 SOLUTION, CONCENTRATE RESPIRATORY (INHALATION) at 07:40

## 2023-03-30 RX ADMIN — HEPARIN SODIUM 2000 UNITS: 1000 INJECTION INTRAVENOUS; SUBCUTANEOUS at 21:05

## 2023-03-30 RX ADMIN — CHLORHEXIDINE GLUCONATE 0.12% ORAL RINSE 15 ML: 1.2 LIQUID ORAL at 21:11

## 2023-03-30 RX ADMIN — SENNOSIDES AND DOCUSATE SODIUM 2 TABLET: 8.6; 5 TABLET ORAL at 09:56

## 2023-03-30 RX ADMIN — HEPARIN SODIUM 4000 UNITS: 1000 INJECTION INTRAVENOUS; SUBCUTANEOUS at 00:40

## 2023-03-30 RX ADMIN — POLYETHYLENE GLYCOL 3350 17 G: 17 POWDER, FOR SOLUTION ORAL at 16:57

## 2023-03-30 RX ADMIN — POLYETHYLENE GLYCOL 3350 17 G: 17 POWDER, FOR SOLUTION ORAL at 21:21

## 2023-03-30 RX ADMIN — PREDNISONE 20 MG: 20 TABLET ORAL at 09:56

## 2023-03-30 RX ADMIN — HEPARIN SODIUM 15.1 UNITS/KG/HR: 10000 INJECTION, SOLUTION INTRAVENOUS at 09:58

## 2023-03-30 RX ADMIN — IPRATROPIUM BROMIDE 0.5 MG: 0.5 SOLUTION RESPIRATORY (INHALATION) at 13:16

## 2023-03-30 RX ADMIN — ATORVASTATIN CALCIUM 40 MG: 40 TABLET, FILM COATED ORAL at 16:59

## 2023-03-30 RX ADMIN — SENNOSIDES AND DOCUSATE SODIUM 2 TABLET: 8.6; 5 TABLET ORAL at 16:59

## 2023-03-30 RX ADMIN — MELATONIN 6 MG: at 21:09

## 2023-03-30 RX ADMIN — QUETIAPINE FUMARATE 200 MG: 100 TABLET ORAL at 21:09

## 2023-03-30 RX ADMIN — DEXMEDETOMIDINE HYDROCHLORIDE 0.2 MCG/KG/HR: 400 INJECTION INTRAVENOUS at 00:09

## 2023-03-30 RX ADMIN — METHOCARBAMOL 500 MG: 500 TABLET ORAL at 23:31

## 2023-03-30 RX ADMIN — METHOCARBAMOL 500 MG: 500 TABLET ORAL at 16:59

## 2023-03-30 RX ADMIN — INSULIN LISPRO 6 UNITS: 100 INJECTION, SOLUTION INTRAVENOUS; SUBCUTANEOUS at 16:59

## 2023-03-30 RX ADMIN — INSULIN LISPRO 2 UNITS: 100 INJECTION, SOLUTION INTRAVENOUS; SUBCUTANEOUS at 06:19

## 2023-03-30 RX ADMIN — IPRATROPIUM BROMIDE 0.5 MG: 0.5 SOLUTION RESPIRATORY (INHALATION) at 07:40

## 2023-03-30 RX ADMIN — OXYCODONE HYDROCHLORIDE 10 MG: 10 TABLET ORAL at 09:56

## 2023-03-30 RX ADMIN — FLUTICASONE FUROATE AND VILANTEROL TRIFENATATE 1 PUFF: 200; 25 POWDER RESPIRATORY (INHALATION) at 10:52

## 2023-03-30 RX ADMIN — HYDRALAZINE HYDROCHLORIDE 25 MG: 25 TABLET, FILM COATED ORAL at 14:56

## 2023-03-30 RX ADMIN — BUDESONIDE 0.5 MG: 0.5 INHALANT ORAL at 07:40

## 2023-03-30 RX ADMIN — METHOCARBAMOL 500 MG: 500 TABLET ORAL at 10:53

## 2023-03-30 RX ADMIN — GABAPENTIN 300 MG: 250 SOLUTION ORAL at 10:10

## 2023-03-30 RX ADMIN — INSULIN GLARGINE 25 UNITS: 100 INJECTION, SOLUTION SUBCUTANEOUS at 21:09

## 2023-03-30 RX ADMIN — NICOTINE 21 MG: 21 PATCH, EXTENDED RELEASE TRANSDERMAL at 10:00

## 2023-03-30 RX ADMIN — OXYCODONE HYDROCHLORIDE 10 MG: 10 TABLET ORAL at 06:10

## 2023-03-30 RX ADMIN — TOPIRAMATE 25 MG: 25 TABLET, FILM COATED ORAL at 23:34

## 2023-03-30 RX ADMIN — HYDROMORPHONE HYDROCHLORIDE 1 MG: 1 INJECTION, SOLUTION INTRAMUSCULAR; INTRAVENOUS; SUBCUTANEOUS at 21:10

## 2023-03-30 RX ADMIN — ACETAMINOPHEN 975 MG: 325 TABLET ORAL at 21:07

## 2023-03-30 RX ADMIN — HEPARIN SODIUM 2000 UNITS: 1000 INJECTION INTRAVENOUS; SUBCUTANEOUS at 13:44

## 2023-03-30 RX ADMIN — ACETAMINOPHEN 975 MG: 325 TABLET ORAL at 14:55

## 2023-03-30 RX ADMIN — OXYCODONE HYDROCHLORIDE 10 MG: 10 TABLET ORAL at 20:01

## 2023-03-30 RX ADMIN — HYDRALAZINE HYDROCHLORIDE 25 MG: 25 TABLET, FILM COATED ORAL at 21:07

## 2023-03-30 RX ADMIN — CHLORHEXIDINE GLUCONATE 0.12% ORAL RINSE 15 ML: 1.2 LIQUID ORAL at 09:57

## 2023-03-30 RX ADMIN — INSULIN LISPRO 6 UNITS: 100 INJECTION, SOLUTION INTRAVENOUS; SUBCUTANEOUS at 21:11

## 2023-03-30 RX ADMIN — HYDROMORPHONE HYDROCHLORIDE 1 MG: 1 INJECTION, SOLUTION INTRAMUSCULAR; INTRAVENOUS; SUBCUTANEOUS at 06:57

## 2023-03-30 RX ADMIN — LEVALBUTEROL HYDROCHLORIDE 1.25 MG: 1.25 SOLUTION, CONCENTRATE RESPIRATORY (INHALATION) at 01:28

## 2023-03-30 RX ADMIN — INSULIN LISPRO 8 UNITS: 100 INJECTION, SOLUTION INTRAVENOUS; SUBCUTANEOUS at 11:33

## 2023-03-30 RX ADMIN — OXYCODONE HYDROCHLORIDE 10 MG: 10 TABLET ORAL at 14:56

## 2023-03-30 RX ADMIN — ASPIRIN 81 MG CHEWABLE TABLET 81 MG: 81 TABLET CHEWABLE at 09:56

## 2023-03-30 RX ADMIN — INSULIN GLARGINE 25 UNITS: 100 INJECTION, SOLUTION SUBCUTANEOUS at 09:57

## 2023-03-30 RX ADMIN — HYDROMORPHONE HYDROCHLORIDE 1 MG: 1 INJECTION, SOLUTION INTRAMUSCULAR; INTRAVENOUS; SUBCUTANEOUS at 10:54

## 2023-03-30 NOTE — DISCHARGE INSTR - OTHER ORDERS
L AKA Incisional care:   Wash incision daily w/ soap and water  Pat dry thoroughly     Betadine paint  ABD pad  ACE wrap to assist w/ edema control/ stump shrinkage

## 2023-03-30 NOTE — PROGRESS NOTES
Daily Progress Note - Critical Care   Eris Rachel 40 y o  female MRN: [id number]  Unit/Bed#: MICU 04 Encounter: [id number]        ----------------------------------------------------------------------------------------  HPI/24hr events: complained of pain LLE twice this morning, improved with PRN Oxy @ 0610 and then with Dilaudid @0657 ; at time of exam ~ 8 am denies any pain     ---------------------------------------------------------------------------------------  SUBJECTIVE      Review of Systems   Constitutional: Negative for appetite change, chills, diaphoresis and fever  HENT: Negative for congestion, mouth sores, nosebleeds, rhinorrhea, sinus pressure, sore throat, tinnitus and trouble swallowing  Eyes: Negative for photophobia and visual disturbance  Respiratory: Negative for cough, choking, chest tightness, shortness of breath and wheezing  Cardiovascular: Negative for chest pain, palpitations and leg swelling  Gastrointestinal: Negative for abdominal pain, constipation, diarrhea, nausea and vomiting  Had 2 bowel movement 03/28    Genitourinary: Negative for dysuria and pelvic pain  Musculoskeletal: Positive for arthralgias  Intermittent LLE pain , overall well controlled on current regimen ; denies any pain at time of exam ~ 8 am ; earlier had pain LLE 10/10 at surgical site with appropriate response to PRN's as above    Neurological: Positive for numbness  Negative for dizziness and weakness  R foot numbness noted 2 days ago now resolved ; denies numbness RLE 03/29-03/30    Psychiatric/Behavioral: Negative for agitation and behavioral problems  Review of systems was reviewed and negative unless stated above in HPI/24-hour events   ---------------------------------------------------------------------------------------  Assessment and Plan:    Neuro:   • Diagnosis: Bipolar Disorder  ?  Plan:   - Seroquel 200 QHS    • Diagnosis: Nicotine Addiction  ? Plan:  - Nicoderm patch    • Diagnosis Pain control  ? Plan:  - APS following  - S/p LLE nerve block 03/27   - Gabapentin 300 BID  - PRNs Acetaminophen, Oxycodone 5/10 , Hydromorphone 1 mg   - Bowel Regimen         CV:   • Diagnosis: PAD s/p L Fem-BK pop bypass/stent w/ restenosis and occlusion  • Diagnosis: S/P Left BKA 03/29   ? Plan:   - Cont ASA and Statin   - On Heparin Drip with plan per vascular to transition to Xarelto     • Diagnosis: HTN  ? Plan:  - Hydralazine 25 TID  - PRN hydralazine  - ACEI/ARB & BB were avoided pre-BKA may consider switch to Losartan-HCTZ for better compliance with QD dosing       Pulm:  • Diagnosis: Asthma  ? Plan:   - Closely monitor airway post-operatively due to need for reintubation 2/2 bronchospasm this hospitalization  - Maintain Sats > 90%  - 03/30 tapering steroid ; down to Prednisone 20 mg x 2 days >> 10 x 2 days   - Xopenex and Atrovent Nebs q6 ; on Pulmicort BID  ; PRN Albuterol HFA   - Respiratory protocol   - Considering switch scheduled nebs to ICS-LABA Symbicort as continues to be stable         GI:   • Diagnosis: History of GERD  ? Plan:   - Denies GI symptoms; tolerating PO diet     • Bowel PPX  • Constipation  - has PRN oxy/dilaudis post L-BKA; wean off as appropriate  - Had 2 BM 03/28 post Miralax x 2 doses + Relistor x 1   ? On Daily Miralax, and Senokot S BID   ? PRN Dulcolax suppository   ? Simethicone      :   • Urinary retention protocol  • Monitor I/Os, Cr        F/E/N:   • F: None  • E: trend/replete PRN  • N: on low carb diet         Heme/Onc:   • Diagnosis: Recurrent occlusion of LLE bypass graft and stent  ? Plan:   - Heme-onc consulted  - F/u outpt  - SCDs  - On Heparin gtt ; plan for transition to Xarelto VTE dosing per vascular         Endo:   • Diagnosis: T2DM   ?  Plan:   - Lantux 20 BID >> 03/30 increased to 25 BID   - SSI  - Low carb diet   - Tapering off steroid   - Goal -180        ID:   No active issues     MSK/Skin: • Diagnosis: Ambulatory dysfunction  ? Plan:   ? Post-op PT/OT  ? PMNR following     Patient appropriate for transfer out of the ICU today?: Patient does not meet criteria for ICU Follow-up Clinic; referral has not been made  Disposition: Transfer to Med-Surg   Code Status: Level 1 - Full Code  ---------------------------------------------------------------------------------------  ICU CORE MEASURES    Prophylaxis   VTE Pharmacologic Prophylaxis: Heparin Drip  VTE Mechanical Prophylaxis: sequential compression device  Stress Ulcer Prophylaxis: Prophylaxis Not Indicated     ABCDE Protocol (if indicated)  Plan to perform spontaneous awakening trial today? Not applicable  Plan to perform spontaneous breathing trial today? Not applicable  Obvious barriers to extubation? Not applicable  CAM-ICU: Negative    Invasive Devices Review  Invasive Devices     Peripherally Inserted Central Catheter Line  Duration           PICC Line  Right Basilic 4 days              Can any invasive devices be discontinued today? No  ---------------------------------------------------------------------------------------  OBJECTIVE    Vitals   Vitals:    23 0500 23 0610 23 0742 23 0743   BP: 115/64 119/71     BP Location:       Pulse: 80 78     Resp: (!) 28 (!) 28     Temp:       TempSrc:       SpO2: 95% 97% 98% 99%   Weight:       Height:         Temp (24hrs), Av 4 °F (36 9 °C), Min:98 °F (36 7 °C), Max:98 9 °F (37 2 °C)  Current: Temperature: 98 3 °F (36 8 °C)    Respiratory:  SpO2: SpO2: 99 %  Nasal Cannula O2 Flow Rate (L/min): 1 L/min    Invasive/non-invasive ventilation settings   Respiratory    Lab Data (Last 4 hours)    None         O2/Vent Data (Last 4 hours)    None                Physical Exam  Constitutional:       General: She is not in acute distress  Appearance: She is obese  She is not ill-appearing  HENT:      Head: Normocephalic and atraumatic        Right Ear: External ear normal  Left Ear: External ear normal       Nose: Nose normal  No congestion or rhinorrhea  Mouth/Throat:      Mouth: Mucous membranes are moist       Pharynx: Oropharynx is clear  Eyes:      Extraocular Movements: Extraocular movements intact  Conjunctiva/sclera: Conjunctivae normal       Pupils: Pupils are equal, round, and reactive to light  Cardiovascular:      Rate and Rhythm: Normal rate and regular rhythm  Pulses: Normal pulses  Heart sounds: Normal heart sounds  No murmur heard  Pulmonary:      Effort: Pulmonary effort is normal  No respiratory distress  Breath sounds: Normal breath sounds  No stridor  No wheezing, rhonchi or rales  Abdominal:      General: Abdomen is flat  Bowel sounds are normal  There is no distension  Palpations: Abdomen is soft  Tenderness: There is no abdominal tenderness  Musculoskeletal:      Cervical back: Normal range of motion  No rigidity  Right lower leg: No edema  Comments: L BKA stump well dressed, clean & dry dressing, no evidence of hemorrhage    Lymphadenopathy:      Cervical: No cervical adenopathy  Neurological:      General: No focal deficit present  Mental Status: She is alert and oriented to person, place, and time  Psychiatric:         Mood and Affect: Mood normal          Behavior: Behavior normal          Thought Content:  Thought content normal          Judgment: Judgment normal                Laboratory and Diagnostics:  Results from last 7 days   Lab Units 03/30/23  0612 03/29/23  2209 03/29/23  0511 03/28/23  0556 03/27/23  0429 03/26/23  0437 03/25/23  0419 03/24/23  1543 03/24/23  1338   WBC Thousand/uL 18 34* 16 04* 15 80* 13 99* 17 93* 17 28* 13 76*  --  17 59*   HEMOGLOBIN g/dL 9 3* 9 6* 11 0* 10 7* 11 9 11 5 12 4  --  12 3   HEMATOCRIT % 26 4* 27 5* 31 2* 30 3* 33 5* 33 3* 35 0  --  34 6*   PLATELETS Thousands/uL 276 270 250 240 273 278 311   < > 329   NEUTROS PCT %  --   --   --  62 82* 88*  -- --  70   BANDS PCT %  --   --   --   --   --   --  4  --   --    MONOS PCT %  --   --   --  6 7 6  --   --  7   MONO PCT %  --   --   --   --   --   --  3*  --   --     < > = values in this interval not displayed  Results from last 7 days   Lab Units 03/30/23  0612 03/29/23  0511 03/28/23  0556 03/27/23  0429 03/26/23  0437 03/25/23  0419 03/24/23  1338   SODIUM mmol/L 136 138 140 136 138 136 131*   POTASSIUM mmol/L 3 7 3 7 3 3* 3 9 4 3 3 9 4 0   CHLORIDE mmol/L 103 105 107 108 111* 109* 106   CO2 mmol/L 28 29 30 25 24 21 20*   ANION GAP mmol/L 5 4 3* 3* 3* 6 5   BUN mg/dL 16 18 17 16 10 10 11   CREATININE mg/dL 0 44* 0 42* 0 38* 0 58* 0 54* 0 58* 0 64   CALCIUM mg/dL 8 1* 8 8 7 6* 7 8* 7 8* 8 2* 7 9*   GLUCOSE RANDOM mg/dL 196* 120 87 237* 207* 211* 372*   ALT U/L 69  --  55 58 33 35 36   AST U/L 58*  --  134* 204* 49* 19 23   ALK PHOS U/L 83  --  80 98 95 109 114   ALBUMIN g/dL 2 0*  --  2 0* 2 5* 2 5* 2 7* 2 7*   TOTAL BILIRUBIN mg/dL 0 32  --  0 42 0 24 0 22 0 23 0 58     Results from last 7 days   Lab Units 03/28/23  0556 03/27/23  0429 03/26/23 0437 03/24/23  1338   MAGNESIUM mg/dL 2 3 2 4 2 5 1 9   PHOSPHORUS mg/dL 4 0 4 0 3 2 3 2      Results from last 7 days   Lab Units 03/30/23  0612 03/29/23 2209 03/29/23  0511 03/28/23  2257 03/28/23  1620 03/28/23  0556 03/27/23  2215 03/27/23  1609 03/24/23  1543   INR   --   --   --   --   --   --   --  1 01 0 99   PTT seconds 67* 35 69* 87* 86* >210* >210* 26 21*  21*              ABG:  Results from last 7 days   Lab Units 03/24/23  1411   PH ART  7 304*   PCO2 ART mm Hg 44 7*   PO2 ART mm Hg 113 7   HCO3 ART mmol/L 21 7*   BASE EXC ART mmol/L -4 6   ABG SOURCE  Radial, Left     VBG:  Results from last 7 days   Lab Units 03/24/23  1411   ABG SOURCE  Radial, Left           Micro          Intake and Output  I/O       03/28 0701 03/29 0700 03/29 0701 03/30 0700 03/30 0701 03/31 0700    P  O  340 480     I V  (mL/kg) 384 6 (3) 1035 7 (8 2)     Total "Intake(mL/kg) 724 6 (5 7) 1515 7 (11 9)     Urine (mL/kg/hr) 2562 (0 8) 3525 (1 2)     Stool 0      Blood  500     Total Output 2562 4025     Net -1837 4 -2509 4            Unmeasured Stool Occurrence 1 x          UOP: 1 2 ml/kg/hr     Height and Weights   Height: 5' 1\" (154 9 cm)  IBW (Ideal Body Weight): 47 8 kg  Body mass index is 52 99 kg/m²  Weight (last 2 days)     Date/Time Weight    03/29/23 0500 127 (280 43)    03/28/23 0559 132 (290 79)            Nutrition       Diet Orders   (From admission, onward)             Start     Ordered    03/29/23 1818  Diet Paul/CHO Controlled; Consistent Carbohydrate Diet Level 2 (5 carb servings/75 grams CHO/meal)  Diet effective now        References:    Nutrtion Support Algorithm Enteral vs  Parenteral   Question Answer Comment   Diet Type Paul/CHO Controlled    Paul/CHO Controlled Consistent Carbohydrate Diet Level 2 (5 carb servings/75 grams CHO/meal)    RD to adjust diet per protocol?  Yes        03/29/23 1818              TF N/A      Active Medications  Scheduled Meds:  Current Facility-Administered Medications   Medication Dose Route Frequency Provider Last Rate   • acetaminophen  650 mg Oral Q4H PRN Janice Ribera MD     • albuterol  2 puff Inhalation Q4H PRN Janice Ribera MD     • aspirin  81 mg Oral Daily Janice Ribera MD     • atorvastatin  40 mg Oral Daily With Nikki Mae MD     • bisacodyl  10 mg Rectal Daily PRN Janice Ribera MD     • budesonide  0 5 mg Nebulization Q12H Janice Ribera MD     • chlorhexidine  15 mL Mouth/Throat Q12H [address] Janice Ribera MD     • fentanyl citrate (PF)  100 mcg Intravenous Once Debarah Brand, DO     • gabapentin  300 mg Oral BID Janice Ribera MD     • heparin (porcine)  3-20 Units/kg/hr (Order-Specific) Intravenous Titrated Power Aiad, DO 15 1 Units/kg/hr (03/30/23 0038)   • heparin (porcine)  2,000 Units Intravenous Q6H PRN Power Aiad, DO     • heparin (porcine)  4,000 Units Intravenous Q6H PRN Power Aiad, DO     • hydrALAZINE  10 mg " "Intravenous Q6H PRN Kilo Gillis MD     • hydrALAZINE  25 mg Oral Count includes the [facility] Kilo Gillis MD     • HYDROmorphone  1 mg Intravenous Q4H PRN Kilo Gillis MD     • insulin glargine  25 Units Subcutaneous Q12H [address] Power Hernandez, DO     • insulin lispro  2-12 Units Subcutaneous TID AC Kilo Gillis MD     • insulin lispro  2-12 Units Subcutaneous HS Kilo Gillis MD     • ipratropium  0 5 mg Nebulization Q6H Kilo Gillis MD     • levalbuterol  1 25 mg Nebulization Q6H Kilo Gillis MD     • melatonin  6 mg Oral HS Kilo Gillis MD     • nicotine  21 mg Transdermal Daily Kilo Gillis MD     • oxyCODONE  10 mg Oral Q4H PRN Kilo Gillis MD     • oxyCODONE  5 mg Oral Q4H PRN Kilo Gillis MD     • polyethylene glycol  17 g Oral Daily Kilo Gillis MD     • predniSONE  20 mg Oral Daily Power Hernandez,      • QUEtiapine  200 mg Oral HS Kilo Gillis MD     • senna-docusate sodium  2 tablet Oral BID Kilo Gillis MD     • simethicone  80 mg Oral Q6H PRN Kilo Gillis MD       Continuous Infusions:  heparin (porcine), 3-20 Units/kg/hr (Order-Specific), Last Rate: 15 1 Units/kg/hr (03/30/23 0038)      PRN Meds:   acetaminophen, 650 mg, Q4H PRN  albuterol, 2 puff, Q4H PRN  bisacodyl, 10 mg, Daily PRN  heparin (porcine), 2,000 Units, Q6H PRN  heparin (porcine), 4,000 Units, Q6H PRN  hydrALAZINE, 10 mg, Q6H PRN  HYDROmorphone, 1 mg, Q4H PRN  oxyCODONE, 10 mg, Q4H PRN  oxyCODONE, 5 mg, Q4H PRN  simethicone, 80 mg, Q6H PRN        Allergies   No Known Allergies  ---------------------------------------------------------------------------------------  Advance Directive and Living Will:      Power of :    POLST:    ---------------------------------------------------------------------------------------  Care Time Delivered:   45 minutes    Power Hernandez DO      Portions of the record may have been created with voice recognition software    Occasional wrong word or \"sound a like\" substitutions may have occurred due to the inherent limitations of voice recognition " software    Read the chart carefully and recognize, using context, where substitutions have occurred

## 2023-03-30 NOTE — ASSESSMENT & PLAN NOTE
Preop ACEi/ARB, beta-blockers were avoided per surgery recs    Now well controlled on hydralazine 25 3 times daily with as needed hydralazine    Consider switching to losartan hydrochlorothiazide for better compliance with daily dosing

## 2023-03-30 NOTE — PLAN OF CARE
Problem: OCCUPATIONAL THERAPY ADULT  Goal: Performs self-care activities at highest level of function for planned discharge setting  See evaluation for individualized goals  Description: Treatment Interventions: ADL retraining, Functional transfer training, UE strengthening/ROM, Endurance training, Patient/family training, Compensatory technique education, Continued evaluation, Energy conservation, Activityengagement          See flowsheet documentation for full assessment, interventions and recommendations  Note: Limitation: Decreased ADL status, Decreased UE strength, Decreased Safe judgement during ADL, Decreased endurance, Decreased self-care trans, Decreased high-level ADLs  Prognosis: Good  Assessment: Pt is a 40 y o  female seen for OT evaluation s/p admit to [facility] on 3/24/2023 w/ Acute respiratory distress  Pt is now s/p left AKA  Comorbidities affecting pt's functional performance at time of assessment include: Asthma, Atherosclerosis, Bipolar 1 disorder ([facility] [address] ), COPD (chronic obstructive pulmonary disease) ([facility] [address] ), Depression, [facility] Hypertension, Psychiatric disorder, PTSD (post-traumatic stress disorder), [facility]izoaffective disorder ([facility] ), and Tendonitis  Personal factors affecting pt at time of IE include:difficulty performing ADLS, difficulty performing IADLS , limited insight into deficits, flat affect, decreased initiation and engagement , health management  and environment  Prior to admission, pt was I with ADLs and needed assist with IADLS  Upon evaluation: Pt presents supine and is agreeable to OTIE  All vitals WNL  Pt requires Mod A x 1-2 2* the following deficits impacting occupational performance: weakness, decreased strength, decreased balance, decreased tolerance, impaired problem solving, increased pain, orthopedic restrictions and decreased coping skills  Pt resting in chair at end of session with all needs in reach, alarm on, all lines in place and SCD's on   Pt to benefit from continued skilled OT tx while in the hospital to address deficits as defined above and maximize level of functional independence w ADL's and functional mobility  Occupational Performance areas to address include: grooming, bathing/shower, toilet hygiene, dressing, health maintenance, functional mobility, community mobility, clothing management and social participation  The patient's raw score on the AM-PAC Daily Activity inpatient short form is 16  , standardized score is 35 96  , less than 39 4  Patients at this level are likely to benefit from discharge to post-acute rehabilitation services  Please refer to the recommendation of the Occupational Therapist for safe discharge planning       OT Discharge Recommendation: Post acute rehabilitation services

## 2023-03-30 NOTE — PLAN OF CARE
Problem: PHYSICAL THERAPY ADULT  Goal: Performs mobility at highest level of function for planned discharge setting  See evaluation for individualized goals  Description: Treatment/Interventions: OT, Spoke to case management, Spoke to nursing, Gait training, Bed mobility, Patient/family training, Endurance training, Functional transfer training, LE strengthening/ROM  Equipment Recommended: Wheelchair, Dinah Dickey       See flowsheet documentation for full assessment, interventions and recommendations  Note: Prognosis: Good  Problem List: Decreased strength, Decreased endurance, Impaired balance, Decreased mobility, Decreased coordination, Decreased cognition, Impaired judgement, Decreased safety awareness, Pain, Orthopedic restrictions  Assessment: Pt is 40 y o  female seen for PT evaluation s/p admit to [facility] on 3/24/2023 w/ Acute respiratory distress  PT consulted to assess pt's functional mobility and d/c needs  Order placed for PT eval and tx, w/ up w/ A order  Comorbidities affecting pt's physical performance at time of assessment include:  has a past medical history of Asthma, Atherosclerosis, Bipolar 1 disorder ([facility] [address] [facility] COPD (chronic obstructive pulmonary disease) ([facility] [address] [facility] Depression, Diabetes mellitus ([facility] [address] ), Hypertension, Psychiatric disorder, PTSD (post-traumatic stress disorder), Schizoaffective disorder ([facility] [address] ), and Tendonitis  PTA, pt was living in hotel with spouse  PT reports she was I with all mobility PTA  Personal factors affecting pt at time of IE include: inability to ambulate household distances, decreased initiation and engagement, unable to perform physical activity, limited insight into impairments, inability to perform IADLs and inability to perform ADLs   Please find objective findings from PT assessment regarding body systems outlined above with impairments and limitations including weakness, decreased ROM, impaired balance, decreased endurance, impaired coordination, gait deviations, pain, decreased activity tolerance, decreased functional mobility tolerance, altered sensation, decreased safety awareness, impaired judgement, fall risk, orthopedic restrictions, SOB upon exertion, decreased skin integrity and decreased cognition  Pt require A to mobilize LE EOB  Required increased A for stump management during bed mobility with severe pain noted  Pt demonstrated good ability to lift buttock during transfers and utilize B/L UE for repositioning in chair  The following objective measures performed on IE also reveal limitations: The patient's AM-PAC Basic Mobility Inpatient Short Form Raw Score is 10  A standardized score less than 42 9 suggests the patient may benefit from discharge to post-acute rehabilitation services  Please also refer to the recommendation of the Physical Therapist for safe discharge planning  Pt's clinical presentation is currently unstable/unpredictable seen in pt's presentation of critical care monitoring  Pt to benefit from continued PT tx to address deficits as defined above and maximize level of functional independent mobility and consistency  From PT/mobility standpoint, recommendation at time of d/c would be post acute rehabilitation services pending progress in order to facilitate return to PLOF  PT Discharge Recommendation: Post acute rehabilitation services    See flowsheet documentation for full assessment

## 2023-03-30 NOTE — PROGRESS NOTES
Vascular Nurse Navigator Post Op Education    Met with patient and wife Luisito Parker at bedside to introduce myself as Vascular Nurse Navigator and explained my role  Patient is appropriate and accepting to education  Patient was educated with Review of written materials provided, Teachback, Explanation, Demonstration and Question & Answer on expectations of post op care and recovery on Left AKA  Patient is a smoker  (1 ppd x 20 yrs), as such Smoking effects on the lungs, tobacco triggers and Smoking cessation was reviewed  Education provided to patient and her wife Luisito Parker on infection prevention, activity limitations, when to call the office, importance of follow up, and incisional care  Discharge instruction handout provided to patient to review  Provided her with information on [facility] and a list of prosthetic suppliers

## 2023-03-30 NOTE — PLAN OF CARE
Problem: MOBILITY - ADULT  Goal: Maintain or return to baseline ADL function  Description: INTERVENTIONS:  -  Assess patient's ability to carry out ADLs; assess patient's baseline for ADL function and identify physical deficits which impact ability to perform ADLs (bathing, care of mouth/teeth, toileting, grooming, dressing, etc )  - Assess/evaluate cause of self-care deficits   - Assess range of motion  - Assess patient's mobility; develop plan if impaired  - Assess patient's need for assistive devices and provide as appropriate  - Encourage maximum independence but intervene and supervise when necessary  - Involve family in performance of ADLs  - Assess for home care needs following discharge   - Consider OT consult to assist with ADL evaluation and planning for discharge  - Provide patient education as appropriate  Outcome: Progressing  Goal: Maintains/Returns to pre admission functional level  Description: INTERVENTIONS:  - Perform BMAT or MOVE assessment daily    - Set and communicate daily mobility goal to care team and patient/family/caregiver  - Collaborate with rehabilitation services on mobility goals if consulted  - Perform Range of Motion 6 times a day  - Reposition patient every 2 hours    - Dangle patient 3 times a day  - Stand patient 3 times a day  - Ambulate patient 3 times a day  - Out of bed to chair 3 times a day   - Out of bed for meals 3 times a day  - Out of bed for toileting  - Record patient progress and toleration of activity level   Outcome: Progressing     Problem: Prexisting or High Potential for Compromised Skin Integrity  Goal: Skin integrity is maintained or improved  Description: INTERVENTIONS:  - Identify patients at risk for skin breakdown  - Assess and monitor skin integrity  - Assess and monitor nutrition and hydration status  - Monitor labs   - Assess for incontinence   - Turn and reposition patient  - Assist with mobility/ambulation  - Relieve pressure over bony prominences  - Avoid friction and shearing  - Provide appropriate hygiene as needed including keeping skin clean and dry  - Evaluate need for skin moisturizer/barrier cream  - Collaborate with interdisciplinary team   - Patient/family teaching  - Consider wound care consult   Outcome: Progressing     Problem: Nutrition/Hydration-ADULT  Goal: Nutrient/Hydration intake appropriate for improving, restoring or maintaining nutritional needs  Description: Monitor and assess patient's nutrition/hydration status for malnutrition  Collaborate with interdisciplinary team and initiate plan and interventions as ordered  Monitor patient's weight and dietary intake as ordered or per policy  Utilize nutrition screening tool and intervene as necessary  Determine patient's food preferences and provide high-protein, high-caloric foods as appropriate       INTERVENTIONS:  - Monitor oral intake, urinary output, labs, and treatment plans  - Assess nutrition and hydration status and recommend course of action  - Evaluate amount of meals eaten  - Assist patient with eating if necessary   - Allow adequate time for meals  - Recommend/ encourage appropriate diets, oral nutritional supplements, and vitamin/mineral supplements  - Order, calculate, and assess calorie counts as needed  - Recommend, monitor, and adjust tube feedings and TPN/PPN based on assessed needs  - Assess need for intravenous fluids  - Provide specific nutrition/hydration education as appropriate  - Include patient/family/caregiver in decisions related to nutrition  Outcome: Progressing     Problem: SAFETY,RESTRAINT: NV/NON-SELF DESTRUCTIVE BEHAVIOR  Goal: Remains free of harm/injury (restraint for non violent/non self-detsructive behavior)  Description: INTERVENTIONS:  - Instruct patient/family regarding restraint use   - Assess and monitor physiologic and psychological status   - Provide interventions and comfort measures to meet assessed patient needs   - Identify and implement measures to help patient regain control  - Assess readiness for release of restraint   Outcome: Progressing  Goal: Returns to optimal restraint-free functioning  Description: INTERVENTIONS:  - Assess the patient's behavior and symptoms that indicate continued need for restraint  - Identify and implement measures to help patient regain control  - Assess readiness for release of restraint   Outcome: Progressing

## 2023-03-30 NOTE — OCCUPATIONAL THERAPY NOTE
Occupational Therapy Evaluation     Patient Name: Jennifer Tello  PXOFG'E Date: 3/30/2023  Problem List  Principal Problem:    Acute respiratory distress  Active Problems:    Essential hypertension    Type 2 diabetes mellitus with diabetic polyneuropathy, with long-term current use of insulin ([facility])    Nicotine dependence    Bipolar disorder ([facility])    PAD (peripheral artery disease) ([facility])    BMI 45 0-49 9, adult ([facility])    S/P BKA (below knee amputation) unilateral, left ([facility])    Atherosclerosis of left leg ([facility])    Past Medical History  Past Medical History:   Diagnosis Date    Asthma     Atherosclerosis     Bipolar 1 disorder ([facility])     COPD (chronic obstructive pulmonary disease) ([facility])     Depression     Diabetes mellitus ([facility] )     Hypertension     Psychiatric disorder     cutting history    PTSD (post-traumatic stress disorder)     Schizoaffective disorder ([facility] )     Tendonitis      Past Surgical History  Past Surgical History:   Procedure Laterality Date    BYPASS FEMORAL-POPLITEAL Left 2021    Procedure: Left Common Femoral Below Knee to Popliteal Bypass with Insitu GSV graft  Left Lower Extremity Angiogram;  Surgeon: Sajan Bravo MD;  [facility]: [facility];  Service: Vascular     SECTION      EAR SURGERY      IR LOWER EXTREMITY ANGIOGRAM  2021    IR LOWER EXTREMITY ANGIOGRAM  2021    ID SLCTV CATHJ 3RD+ ORD SLCTV ABDL PEL/LXTR [facility] Left 3/24/2023    Procedure: Left leg angiogram;  Surgeon: Sebastien Sommer DO;  Location: [facility];  Service: Vascular    TUBAL LIGATION             23 0950   OT Last Visit   OT Visit Date 23   Note Type   Note type Evaluation   Pain Assessment   Pain Score 6   Pain Location/Orientation Orientation: Left; Location: Leg   Restrictions/Precautions   Weight Bearing Precautions Per Order Yes   LLE Weight Bearing Per Order NWB  (s/p AKA)   Other Precautions Chair Alarm;Multiple lines;Telemetry;O2;Fall Risk;Pain;WBS   Home Living   Type of Home "Other (Comment)  [employer])   Home Layout One level   Bathroom Shower/Tub Walk-in shower   [address] [facility] Accessibility Not accessible   Additional Comments Pt was living with wife in a [place] on the [place] [address] floor with no DOROTHY, Denies DME PTA  Pt reports there is a walk in shower but the bathroom is small and she will not be able to fit DME into the bathroom upon d/c   Prior Function   Level of [place] Independent with ADLs; Independent with functional mobility; Needs assistance with IADLS   Lives With Spouse   Receives Help From Family   IADLs Independent with meal prep; Independent with medication management; Family/Friend/Other provides transportation   Falls in the last 6 months 0   Lifestyle   Autonomy I with ADLS, shares IALD with wife Does not drive   Reciprocal Relationships supportive wife   Service to Others not working   Intrinsic Gratification Shopping   Subjective   Subjective \"I dont think I'll be getting out for while\"   ADL   Where Assessed Chair   Grooming Assistance 5  Supervision/Setup   UB Bathing Assistance 4  Minimal Assistance   LB Bathing Assistance 2  Maximal Assistance   UB Dressing Assistance 4  Minimal Assistance   LB Dressing Assistance 2  Maximal [address]  2  Maximal Assistance   Bed Mobility   Supine to Sit 3  Moderate assistance   Additional items Assist x 2   Additional Comments OOB at end of session   Transfers   Sit to Stand 3  Moderate assistance   Additional items Assist x 2   Stand to Sit 3  Moderate assistance   Additional items Assist x 2   Sit pivot 3  Moderate assistance   Additional items Assist x 1   Additional Comments Attempts Stand pivot txf, unable to complete requires overall mod A for sit pivot to drp arm chair   Balance   Static Sitting Fair +   Dynamic Sitting Fair   Static Standing Poor   Dynamic Standing Poor -   Ambulatory Poor -   Activity Tolerance   Activity Tolerance Patient limited by fatigue;Patient limited by pain " Medical Staff Made Aware DPT, NSG aware   Nurse Made Aware yes   RUE Assessment   RUE Assessment WFL   LUE Assessment   LUE Assessment WFL   Psychosocial   Psychosocial (WDL) WDL   Cognition   Overall Cognitive Status WFL   Arousal/Participation Alert; Responsive; Cooperative   Attention Within functional limits   Orientation Level Oriented X4   Memory Within functional limits   Following Commands Follows all commands and directions without difficulty   Comments Pleasant and cooperative   Assessment   Limitation Decreased ADL status; Decreased UE strength;Decreased Safe judgement during ADL;Decreased endurance;Decreased self-care trans;Decreased high-level ADLs   Prognosis Good   Assessment Pt is a 40 y o  female seen for OT evaluation s/p admit to [facility] on 3/24/2023 w/ Acute respiratory distress  Pt is now s/p left AKA  Comorbidities affecting pt's functional performance at time of assessment include: Asthma, Atherosclerosis, Bipolar 1 disorder ([facility] ), COPD (chronic obstructive pulmonary disease) ([facility] [facility] ), Depression, Diabetes mellitus ([facility] [facility] ), Hypertension, Psychiatric disorder, PTSD (post-traumatic stress disorder), Schizoaffective disorder ([facility] ), and Tendonitis  Personal factors affecting pt at time of IE include:difficulty performing ADLS, difficulty performing IADLS , limited insight into deficits, flat affect, decreased initiation and engagement , health management  and environment  Prior to admission, pt was I with ADLs and needed assist with IADLS  Upon evaluation: Pt presents supine and is agreeable to OTIE  All vitals WNL  Pt requires Mod A x 1-2 2* the following deficits impacting occupational performance: weakness, decreased strength, decreased balance, decreased tolerance, impaired problem solving, increased pain, orthopedic restrictions and decreased coping skills  Pt resting in chair at end of session with all needs in reach, alarm on, all lines in place and SCD's on   Pt to benefit from continued skilled OT tx while in the hospital to address deficits as defined above and maximize level of functional independence w ADL's and functional mobility  Occupational Performance areas to address include: grooming, bathing/shower, toilet hygiene, dressing, health maintenance, functional mobility, community mobility, clothing management and social participation  The patient's raw score on the AM-PAC Daily Activity inpatient short form is 16  , standardized score is 35 96  , less than 39 4  Patients at this level are likely to benefit from discharge to post-acute rehabilitation services  Please refer to the recommendation of the Occupational Therapist for safe discharge planning  Goals   Patient Goals To get better   Plan   Treatment Interventions ADL retraining;Functional transfer training;UE strengthening/ROM; Endurance training;Patient/family training; Compensatory technique education;Continued evaluation; Energy conservation; Activityengagement   Goal Expiration Date 04/13/23   OT Frequency 3-5x/wk   Recommendation   OT Discharge Recommendation Post acute rehabilitation services   [facility] Daily Activity Inpatient   Lower Body Dressing 2   Bathing 2   Toileting 2   Upper Body Dressing 3   Grooming 3   Eating 4   Daily Activity Raw Score 16   Daily Activity Standardized Score (Calc for Raw Score >=11) 35 96   AM-PAC Applied Cognition Inpatient   Following a Speech/Presentation 4   Understanding Ordinary Conversation 4   Taking Medications 4   Remembering Where Things Are Placed or Put Away 4   Remembering List of 4-5 Errands 4   Taking Care of Complicated Tasks 4   Applied Cognition Raw Score 24   Applied Cognition Standardized Score 62 21     OT goals to be addressed in the next 14 days:    1) Pt will increase activity tolerance to G for 30 min txment sessions  2) Pt will complete UB/LB dressing/self care w/ mod I using adaptive device and DME as needed  3) Pt will complete bathing w/ Mod I w/ use of AE and DME as needed  4) Pt will complete toileting w/ mod I w/ G hygiene/thoroughness using DME as needed  5) Pt will improve functional transfers to Mod I on/off all surfaces using DME as needed w/ G balance/safety   6) Pt will improve functional mobility via w/c level during ADL/IADL/leisure tasks to Mod I using DME as needed w/ G safety   7) Pt will participate in simulated IADL management task with DME as needed to increase independence to  w/ G safety and endurance  8) Pt will demonstrate G carryover of pt/caregiver education and training as appropriate  9) Pt will demonstrate 100% carryover of energy conservation techniques t/o functional I/ADL/leisure tasks w/o cues s/p skilled education  10) Pt will independently identify and utilize 2-3 coping strategies to increase positive affect and promote overall well-being

## 2023-03-30 NOTE — PLAN OF CARE
Problem: MOBILITY - ADULT  Goal: Maintain or return to baseline ADL function  Description: INTERVENTIONS:  -  Assess patient's ability to carry out ADLs; assess patient's baseline for ADL function and identify physical deficits which impact ability to perform ADLs (bathing, care of mouth/teeth, toileting, grooming, dressing, etc )  - Assess/evaluate cause of self-care deficits   - Assess range of motion  - Assess patient's mobility; develop plan if impaired  - Assess patient's need for assistive devices and provide as appropriate  - Encourage maximum independence but intervene and supervise when necessary  - Involve family in performance of ADLs  - Assess for home care needs following discharge   - Consider OT consult to assist with ADL evaluation and planning for discharge  - Provide patient education as appropriate  Outcome: Progressing  Goal: Maintains/Returns to pre admission functional level  Description: INTERVENTIONS:  - Perform BMAT or MOVE assessment daily    - Set and communicate daily mobility goal to care team and patient/family/caregiver  - Collaborate with rehabilitation services on mobility goals if consulted  - Perform Range of Motion 4 times a day  - Reposition patient every 2 hours    - Dangle patient 2 times a day  - Stand patient 1 times a day  - Ambulate patient 0 times a day  - Out of bed to chair 1 times a day   - Out of bed for meals 0 times a day  - Out of bed for toileting  - Record patient progress and toleration of activity level   Outcome: Progressing     Problem: Prexisting or High Potential for Compromised Skin Integrity  Goal: Skin integrity is maintained or improved  Description: INTERVENTIONS:  - Identify patients at risk for skin breakdown  - Assess and monitor skin integrity  - Assess and monitor nutrition and hydration status  - Monitor labs   - Assess for incontinence   - Turn and reposition patient  - Assist with mobility/ambulation  - Relieve pressure over bony prominences  - Avoid friction and shearing  - Provide appropriate hygiene as needed including keeping skin clean and dry  - Evaluate need for skin moisturizer/barrier cream  - Collaborate with interdisciplinary team   - Patient/family teaching  - Consider wound care consult   Outcome: Progressing     Problem: Nutrition/Hydration-ADULT  Goal: Nutrient/Hydration intake appropriate for improving, restoring or maintaining nutritional needs  Description: Monitor and assess patient's nutrition/hydration status for malnutrition  Collaborate with interdisciplinary team and initiate plan and interventions as ordered  Monitor patient's weight and dietary intake as ordered or per policy  Utilize nutrition screening tool and intervene as necessary  Determine patient's food preferences and provide high-protein, high-caloric foods as appropriate       INTERVENTIONS:  - Monitor oral intake, urinary output, labs, and treatment plans  - Assess nutrition and hydration status and recommend course of action  - Evaluate amount of meals eaten  - Assist patient with eating if necessary   - Allow adequate time for meals  - Recommend/ encourage appropriate diets, oral nutritional supplements, and vitamin/mineral supplements  - Order, calculate, and assess calorie counts as needed  - Recommend, monitor, and adjust tube feedings and TPN/PPN based on assessed needs  - Assess need for intravenous fluids  - Provide specific nutrition/hydration education as appropriate  - Include patient/family/caregiver in decisions related to nutrition  Outcome: Progressing     Problem: SAFETY,RESTRAINT: NV/NON-SELF DESTRUCTIVE BEHAVIOR  Goal: Remains free of harm/injury (restraint for non violent/non self-detsructive behavior)  Description: INTERVENTIONS:  - Instruct patient/family regarding restraint use   - Assess and monitor physiologic and psychological status   - Provide interventions and comfort measures to meet assessed patient needs   - Identify and implement measures to help patient regain control  - Assess readiness for release of restraint   Outcome: Progressing  Goal: Returns to optimal restraint-free functioning  Description: INTERVENTIONS:  - Assess the patient's behavior and symptoms that indicate continued need for restraint  - Identify and implement measures to help patient regain control  - Assess readiness for release of restraint   Outcome: Progressing

## 2023-03-30 NOTE — ASSESSMENT & PLAN NOTE
NicoDerm 21 mg daily  Counseling on smoking cessation especially with her peripheral artery disease and complications as above

## 2023-03-30 NOTE — PROGRESS NOTES
Transfer Note - ICU/Stepdown Transfer to [facility]/MS tele   Kamala Jackson 40 y o  female MRN: [id number]  [address] [facility]   Unit/Bed#: MICU 04 Encounter: [id number]    Code Status: Level 1 - Full Code    Reason for ICU/Stepdown admission:     Active problems:     S/P BKA (below knee amputation) unilateral, left ([facility] )  Assessment & Plan  History of peripheral artery disease, status post left femoropopliteal bypass 2021, stenting, restenosis, was supposed to to be on statin and Xarelto outpatient, never smoker, 1 pack a day, and morbid obesity, presented with left lower extremity pain, underwent arteriogram with vascular surgery for potential intervention 3/27 however complete occlusion nonoperable, extubation complicated with attempted but patient had bronchospasm, present for close monitoring at MICU       Per discussion with vascular surgery, patient agreed for left BKA, performed 0/13 with no complications    Plan  Vascular surgery and acute pain following, input appreciated  S/p left BKA 3/29  Per vascular, currently on heparin drip, with potential plan to transition to Xarelto VTE dosing  Per vascular, to continue on aspirin, Plavix was held  Pain control: S/p regional block, as needed acetaminophen, Oxy's, Dilaudid, gabapentin schedule 300 twice daily  Diabetic control, see diabetes A&P  Was evaluated by heme-onc inpatient, will need outpatient follow-up upon discharge    Atherosclerosis of left leg ([facility] )  Assessment & Plan  See left BKA assessment and plan    BMI 45 0-49 9, adult [facility])  Assessment & Plan  Morbidly obese and now complicated with left BKA limiting exercise  Consider weight management clinic  Counseled on low-carb diet  PT OT and PMNR following  On Topamax already for PTSD/bipolar  May benefit from DDP 4 for both diabetes and weight management    PAD (peripheral artery disease) ([facility] )  Assessment & Plan  Continue follow-up with vascular  See left BKA assessment and plan  Aspirin, statin, anticoagulation as above    Bipolar disorder ([address] )  Assessment & Plan  On Seroquel, restarted home Topamax and prazosin  Mood seems stable through this admission denies any suicidal thoughts or ideation, denies anxiety or agitation    Nicotine dependence  Assessment & Plan  NicoDerm 21 mg daily  Counseling on smoking cessation especially with her peripheral artery disease and complications as above    Type 2 diabetes mellitus with diabetic polyneuropathy, with long-term current use of insulin [facility])  Assessment & Plan  Lab Results   Component Value Date    HGBA1C 9 8 (A) 12/20/2022       Recent Labs     03/29/23  0713 03/29/23  1649 03/29/23 2052 03/30/23  0618   POCGLU 111 372* 450* 197*       Blood Sugar Average: Last 72 hrs:  (P) 397 [id number]     Home medications listed including Trulicity, Lantus 20 units twice daily, lispro 3 units 3 times daily and metformin however questionable adherence with medications    Plan  On Lantus 20 twice daily, 03/30 increased to 25 twice daily however on steroid  Sliding scale insulin 4 times daily  Educated on low-carb diets and adherence with diet and medication  Continue to monitor, hypoglycemia protocol  Strict diabetic control needed given her recent left BKA, patient made aware and verbalized understanding    Essential hypertension  Assessment & Plan  Preop ACEi/ARB, beta-blockers were avoided per surgery recs    Now well controlled on hydralazine 25 3 times daily with as needed hydralazine    Consider switching to losartan hydrochlorothiazide for better compliance with daily dosing        Consultants:   · PMNR  · Case Management   · Cardiology  · Acute Pain   · Hematology    History of Present Illness/Summary of clinical course:   40years old female with past medical history remarkable for bipolar/PTSD, asthma/COPD, diabetes, peripheral artery disease, s/p left femoropopliteal bypass, stenting, restenosis, everyday smoker 1 pack a day, history of drug use in the past, hypertension, presented with left lower extremity pain, went for angiogram with vascular surgery, 3/27, deemed inoperable, attempted extubation however was complicated by bronchospasm, s/p epi boluses, steroids, currently on steroid taper, s/p left BKA 3/29, see above assessment and plan for further details, patient was extubated 3/29, tolerating diet, pain is well controlled on current regimen, per vascular on heparin drip for now, with potential plan to switch to Xarelto, to continue aspirin and statin, will need vascular and heme-onc follow-up outpatient, denies any new symptoms or concern, her spouse made at bedside today and updated about above assessment and plan including transfer to [place] [address]    Please refer to today's progress note for further clinical details  Recent or scheduled procedures: Left BKA 3/29    Outstanding/pending diagnostics: Pending vascular recs, potential plan to switch to Xarelto, will need heme-onc follow-up outpatient       Mobilization Plan: PT OT and PMNR following    Nutrition Plan: Low-carb diet    Discharge Plan: Transition to [facility], accepted by [facility], Dr Verna Jackson, PT OT and [facility] evaluating     Specific Diagnosis Plan:    [  ] Family aware of transfer out of critical care: yes   [  ] Home medications that are not reordered and reason why: P o  diabetic and hypertension medications changes/held      Spoke with Dr Verna Jackson, regarding transfer to [facility]  Patient accepted to their service      DO Lizeth

## 2023-03-30 NOTE — PROGRESS NOTES
"Progress Note - Vascular Surgery   Sandra Girt 40 y o  female MRN: [id number]  Unit/Bed#: MICU 04 Encounter: [id number]    Assessment:  40 y o  F with history of obesity, DM, COPD, PAD with h/o  L SFA PTA/stent 8/11/2021, subsequent stent thrombectomy 8/25/2021 and ultimate  L CFA to BK-pop bypass with in situ GSV 9/14/21 with decreased NIKKI and elevated velocities in proximal anastomosis, now s/p LLE Agram which revealed an occluded left fem-BK pop bypass, no outflow into the foot  Now s/p L AKA 3/29    Initial postoperative course was complicated by prolonged intubation for bronchospasm, however she extubated POD0 after her L AKA    Plan:  Continue diet as tolerated  Tight blood glucose control  Continue heparin gtt with plan to transition to Xarelto VTE dosing   Continue ASA/Statin   APS for pain control  Bowel regimen   PT/OT  Wound check tomorrow  Remainder of care per primary team      Subjective/Objective     Subjective: No acute events overnight  She complains of pain in the L AKA stump  Tolerating diet without nausea/vomiting  No fevers, chills  Objective:     Blood pressure 119/71, pulse 78, temperature 98 3 °F (36 8 °C), temperature source Oral, resp  rate (!) 28, height 5' 1\" (1 549 m), weight 127 kg (280 lb 6 8 oz), SpO2 97 %, not currently breastfeeding  ,Body mass index is 52 99 kg/m²        Intake/Output Summary (Last 24 hours) at 3/30/2023 0710  Last data filed at 3/30/2023 0600  Gross per 24 hour   Intake 1515 65 ml   Output 4025 ml   Net -2509 35 ml       Invasive Devices     Peripherally Inserted Central Catheter Line  Duration           PICC Line 44/64/89 Right Basilic 4 days                Physical Exam:   NAD, alert and oriented x3  Normocephalic, atraumatic  Moist mucous membranes   Norm resp effort on room air   Regular rate  Abd soft, obese  L AKA stump dressing is c/d/i   No calf tenderness or peripheral edema  CN grossly intact   Skin is warm and dry        Lab, Imaging and " other studies:  CBC:   Lab Results   Component Value Date    WBC 18 34 (H) 03/30/2023    HGB 9 3 (L) 03/30/2023    HCT 26 4 (L) 03/30/2023    MCV 74 (L) 03/30/2023     03/30/2023    MCH 25 9 (L) 03/30/2023    MCHC 35 2 03/30/2023    RDW 15 4 (H) 03/30/2023    MPV 10 2 03/30/2023   , CMP:   Lab Results   Component Value Date    SODIUM 136 03/30/2023    K 3 7 03/30/2023     03/30/2023    CO2 28 03/30/2023    BUN 16 03/30/2023    CREATININE 0 44 (L) 03/30/2023    CALCIUM 8 1 (L) 03/30/2023    AST 58 (H) 03/30/2023    ALT 69 03/30/2023    ALKPHOS 83 03/30/2023    EGFR 130 03/30/2023     VTE Pharmacologic Prophylaxis: Heparin gtt  VTE Mechanical Prophylaxis: sequential compression device

## 2023-03-30 NOTE — ASSESSMENT & PLAN NOTE
Lab Results   Component Value Date    HGBA1C 9 8 (A) 12/20/2022       Recent Labs     03/29/23  0713 03/29/23  1649 03/29/23 2052 03/30/23  0618   POCGLU 111 372* 450* 197*       Blood Sugar Average: Last 72 hrs:  (P) 603 [id number]     Home medications listed including Trulicity, Lantus 20 units twice daily, lispro 3 units 3 times daily and metformin however questionable adherence with medications    Plan  On Lantus 20 twice daily, 03/30 increased to 25 twice daily however on steroid  Sliding scale insulin 4 times daily  Educated on low-carb diets and adherence with diet and medication  Continue to monitor, hypoglycemia protocol  Strict diabetic control needed given her recent left BKA, patient made aware and verbalized understanding

## 2023-03-30 NOTE — ASSESSMENT & PLAN NOTE
On Seroquel, restarted home Topamax and prazosin  Mood seems stable through this admission denies any suicidal thoughts or ideation, denies anxiety or agitation

## 2023-03-30 NOTE — PROGRESS NOTES
Peripheral Nerve Block Follow-up Note - Acute Pain Service    Josh Willams 40 y o  female MRN: [id number]  Unit/Bed#: MICU 04 Encounter: [id number]      Assessment:   Principal Problem:    Acute respiratory distress  Active Problems:    Atherosclerosis of left leg ([address] )    Josh Willams is a 40y o  year old female with a past medical history significant for bipolar disorder, type 2 diabetes, PTSD, peripheral arterial disease status post left SFA PTA/stent placement in August 2021 with subsequent stent thrombectomy on 8/25/2021 requiring left CFA to BK pop bypass in September 2021  Patient was found to have occluded left FEM BK pop bypass on left lower extremity Agram with no outflow into the foot  Patient is now status post left AKA on 3/29/2023  Patient received left abductor canal and left popliteal block for postoperative analgesia  Patient had almost no pain until this morning at which time she took oxycodone and required breakthrough intravenous opioid analgesia  Patient refers good pain control with current regimen and states that she feels that her peripheral nerve blocks have worn off  She denies any sensory or motor deficit at this time      Plan:   Left adductor block has resolved appropriately with no residual deficits  Left popliteal block has resolved appropriately with no residual deficits  Change acetaminophen to 975 mg p o  every 8 hours scheduled  Patient is on prednisone per primary team  Would avoid NSAIDs in the setting of anticoagulation  Continue oxycodone 5 mg p o  every 4 hours as needed for moderate pain  Continue oxycodone 10 mg p o  every 4 hours as needed for severe pain  Continue hydromorphone 1 mg IV every 4 hours as needed for breakthrough pain  Change gabapentin from elixir oral capsule form  Start methocarbamol 500 mg p o  every 6 hours scheduled  Start naloxone as needed for respiratory depression/opioid reversal  Bowel regimen per primary team to avoid opioid-induced constipation  Recommend smoking cessation    If pain would increase, could consider increasing frequency or amount of oral/IV opioid analgesics with close consideration for previous respiratory issues  If this would prove ineffective for pain control, please reach out to the acute pain service for consideration of ketamine infusion  APS will sign off at this time  Thank you for the consult  All opioids and other analgesics to be written at discretion of primary team  Please contact Acute Pain Service - [facility] via [employer] from [phone] with additional questions or concerns  See [facility] or Sivan for additional contacts and after hours information  Pain History  Current pain location(s): L stump  Pain Scale:   0-10  Quality: sore  24 hour history: Patient underwent left AKA  Patient received peripheral nerve blocks as above  Postoperatively, patient was maintained intubated given previous respiratory issues after a gram   She has since been extubated  She feels her pain has been well controlled with current regimen  She feels that her nerve blocks have worn off  She denies any motor or sensory deficits at this time  Today, patient is remained hemodynamically stable and afebrile  She is not requiring supplemental oxygen  Laboratory studies significant for creatinine 0 44, AST 58, ALT 69, albumin 2 0, alkaline phosphatase 83, total bilirubin 0 32, PTT 67, WBC 18 34, hemoglobin 9 3 with MCV 74, platelets 448  Chest x-ray from 325 with left greater than right bibasilar opacity atelectasis and/or pneumonia  Opioid requirement previous 24 hours:   Oxycodone 20 mg p o  Hydromorphone 3 mg IV  Fentanyl 200 mcg IV    Meds/Allergies   all current active meds have been reviewed    No Known Allergies    Objective     Temp:  [98 °F (36 7 °C)-98 9 °F (37 2 °C)] 98 8 °F (37 1 °C)  HR:  [] 98  Resp:  [15-28] 23  BP: (108-135)/(58-80) 130/80    Physical Exam  Vitals and nursing note reviewed  Constitutional:       General: She is not in acute distress  Appearance: Normal appearance  She is not ill-appearing or toxic-appearing  HENT:      Head: Normocephalic and atraumatic  Eyes:      General: No scleral icterus  Conjunctiva/sclera: Conjunctivae normal    Cardiovascular:      Rate and Rhythm: Normal rate  Pulmonary:      Effort: Pulmonary effort is normal  No respiratory distress  Comments: RA  Musculoskeletal:         General: Tenderness present  Comments: L AKA   Skin:     General: Skin is warm and dry  Neurological:      Mental Status: She is alert  Sensory: No sensory deficit  Motor: No weakness  Comments: Awake, alert and oriented to person place time situation  POSS 1   Psychiatric:         Thought Content: Thought content normal            Lab Results:   Results from last 7 days   Lab Units 03/30/23  0612   WBC Thousand/uL 18 34*   HEMOGLOBIN g/dL 9 3*   HEMATOCRIT % 26 4*   PLATELETS Thousands/uL 276      Results from last 7 days   Lab Units 03/30/23  0612   POTASSIUM mmol/L 3 7   CHLORIDE mmol/L 103   CO2 mmol/L 28   BUN mg/dL 16   CREATININE mg/dL 0 44*   CALCIUM mg/dL 8 1*   ALK PHOS U/L 83   ALT U/L 69   AST U/L 58*       Imaging Studies: I have personally reviewed pertinent reports  EKG, Pathology, and Other Studies: I have personally reviewed pertinent reports  Please note that the APS provides consultative services regarding pain management only  With the exception of ketamine, peripheral nerve catheters, and epidural infusions (and except when indicated), final decisions regarding starting or changing doses of analgesic medications are at the discretion of the consulting service  Off hours consultation and/or medication management is generally not available      Moe Sanchez MD  Acute Pain Service

## 2023-03-30 NOTE — ASSESSMENT & PLAN NOTE
Morbidly obese and now complicated with left BKA limiting exercise  Consider weight management clinic  Counseled on low-carb diet  PT OT and PMNR following  On Topamax already for PTSD/bipolar  May benefit from DDP 4 for both diabetes and weight management

## 2023-03-30 NOTE — ASSESSMENT & PLAN NOTE
Continue follow-up with vascular  See left BKA assessment and plan  Aspirin, statin, anticoagulation as above

## 2023-03-30 NOTE — PHYSICAL THERAPY NOTE
Physical Therapy Evaluation     Patient's Name: Marleni Finn    Admitting Diagnosis  PAD (peripheral artery disease) ([place] [address] ) [I73 9]    Problem List  Patient Active Problem List   Diagnosis    Headache    Essential hypertension    Type 2 diabetes mellitus with diabetic polyneuropathy, with long-term current use of insulin ([facility])    Paresthesia    COPD (chronic obstructive pulmonary disease) ([facility])    Nicotine dependence    Bipolar disorder ([facility])    Leg pain    Bilateral leg pain    Diarrhea    PAD (peripheral artery disease) ([facility])    BMI 45 0-49 9, adult ([place] [address] )    Bursitis of left knee    S/P BKA (below knee amputation) unilateral, left ([facility])    Atherosclerosis of left leg ([facility])    Positive D dimer    Surgical wound breakdown, sequela    Class 3 severe obesity due to excess calories without serious comorbidity with body mass index (BMI) of 40 0 to 44 9 in adult [facility])    Acute respiratory distress       Past Medical History  Past Medical History:   Diagnosis Date    Asthma     Atherosclerosis     Bipolar 1 disorder ([place] [address] )     COPD (chronic obstructive pulmonary disease) ([place] [address] )     Depression     Diabetes mellitus ([place] [address] )     Hypertension     Psychiatric disorder     cutting history    PTSD (post-traumatic stress disorder)     Schizoaffective disorder ([place] [address] )     Tendonitis        Past Surgical History  Past Surgical History:   Procedure Laterality Date    BYPASS FEMORAL-POPLITEAL Left 2021    Procedure: Left Common Femoral Below Knee to Popliteal Bypass with Insitu GSV graft    Left Lower Extremity Angiogram;  Surgeon: Samira Juarez MD;  Location: [facility];  Service: Vascular     SECTION      EAR SURGERY      IR LOWER EXTREMITY ANGIOGRAM  2021    IR LOWER EXTREMITY ANGIOGRAM  2021    DE SLCTV CATHJ 3RD+ ORD SLCTV ABDL PEL/LXTR [facility] Left 3/24/2023    Procedure: Left leg angiogram;  Surgeon: Jaime Beck DO;  Location: [facility];  Service: Vascular    TUBAL LIGATION          23 0951   PT Last Visit   PT Visit Date 03/30/23   Note Type   Note type Evaluation   Pain Assessment   Pain Assessment Tool 0-10   Pain Score 6   Pain Location/Orientation Orientation: Left; Location: Leg   Restrictions/Precautions   Weight Bearing Precautions Per Order Yes   LLE Weight Bearing Per Order NWB  (s/p AKA)   Other Precautions Chair Alarm; Bed Alarm;Pain; Fall Risk;Telemetry;Multiple lines   Home Living   Type of Home Other (Comment)  (hotel)   Home Layout One level   Bathroom Shower/Tub Walk-in shower   Bathroom Toilet Standard   Bathroom Accessibility Not accessible   Prior Function   Level of [place] Independent with functional mobility   Lives With Spouse   Receives Help From Family   Cognition   Orientation Level Oriented X4   Subjective   Subjective Pt willing and agreeable to PT session   RLE Assessment   RLE Assessment WFL   LLE Assessment   LLE Assessment WFL   Coordination   Movements are Fluid and Coordinated 0   Coordination and Movement Description slow and guarded with increased pain   Bed Mobility   Supine to Sit 3  Moderate assistance   Additional items Assist x 2   Additional Comments Pt left resting in chair, call bell in reach   Transfers   Sit to Stand 3  Moderate assistance   Additional items Assist x 2   Stand to Sit 3  Moderate assistance   Additional items Assist x 2   Sit pivot 3  Moderate assistance   Additional items Assist x 1   Ambulation/Elevation   Gait pattern Not appropriate   Balance   Static Sitting Fair +   Dynamic Sitting Fair   Static Standing Poor   Dynamic Standing Poor -   Ambulatory Poor -   Endurance Deficit   Endurance Deficit Yes   Endurance Deficit Description limited by fatigue, pain, weakness   Activity Tolerance   Activity Tolerance Patient limited by pain; Patient limited by fatigue   Medical Staff Made Aware OT for D/C planning   Nurse Made Aware yes, nsg gave clearance to work with pt   Assessment   Prognosis Good   Problem List Decreased strength;Decreased endurance; Impaired balance;Decreased mobility; Decreased coordination;Decreased cognition; Impaired judgement;Decreased safety awareness;Pain;Orthopedic restrictions   Assessment Pt is 40 y o  female seen for PT evaluation s/p admit [facility] on [facility]/24/2023 w/ Acute respiratory distress  PT consulted to assess pt's functional mobility and d/c needs  Order placed for PT eval and tx, w/ up w/ A order  Comorbidities affecting pt's physical performance at time of assessment include:  has a past medical history of Asthma, Atherosclerosis, Bipolar 1 disorder ([facility] [address] ), COPD (chronic obstructive pulmonary disease) ([facility] [address] ), Depression, Diabetes mellitus ([facility] [address] ), Hypertension, Psychiatric disorder, PTSD (post-traumatic stress disorder), Schizoaffective disorder ([facility] [address] ), and Tendonitis  PTA, pt was living in hotel with spouse  PT reports she was I with all mobility PTA  Personal factors affecting pt at time of IE include: inability to ambulate household distances, decreased initiation and engagement, unable to perform physical activity, limited insight into impairments, inability to perform IADLs and inability to perform ADLs  Please find objective findings from PT assessment regarding body systems outlined above with impairments and limitations including weakness, decreased ROM, impaired balance, decreased endurance, impaired coordination, gait deviations, pain, decreased activity tolerance, decreased functional mobility tolerance, altered sensation, decreased safety awareness, impaired judgement, fall risk, orthopedic restrictions, SOB upon exertion, decreased skin integrity and decreased cognition  Pt require A to mobilize LE EOB  Required increased A for stump management during bed mobility with severe pain noted  Pt demonstrated good ability to lift buttock during transfers and utilize B/L UE for repositioning in chair  The following objective measures performed on IE also reveal limitations:  The patient's AM-PAC Basic Mobility Inpatient Short Form Raw Score is 10  A standardized score less than 42 9 suggests the patient may benefit from discharge to post-acute rehabilitation services  Please also refer to the recommendation of the Physical Therapist for safe discharge planning  Pt's clinical presentation is currently unstable/unpredictable seen in pt's presentation of critical care monitoring  Pt to benefit from continued PT tx to address deficits as defined above and maximize level of functional independent mobility and consistency  From PT/mobility standpoint, recommendation at time of d/c would be post acute rehabilitation services pending progress in order to facilitate return to PLOF  Goals   Patient Goals To get better   STG Expiration Date 04/11/23   Short Term Goal #1 1  Complete bed mobility with S to decrease caregiver burden  2  Tolerate sitting EOB x 25 min with fair balance to complete ADLs  3  Complete transfers with Cristino to decrease caregiver burden  4  I with W/C mobility x 150' to be I in household  5  Tolerate standing with MinAx1 x 2 min to prepare for hoping  6  PT to see for hopping as appropriate   Plan   Treatment/Interventions OT; Spoke to case management;Spoke to nursing;Gait training;Bed mobility; Patient/family training; Endurance training;Functional transfer training;LE strengthening/ROM   PT Frequency 3-5x/wk   Recommendation   PT Discharge Recommendation Post acute rehabilitation services   Equipment Recommended Wheelchair;Walker   AM-PAC Basic Mobility Inpatient   Turning in Flat Bed Without Bedrails 2   Lying on Back to Sitting on Edge of Flat Bed Without Bedrails 2   Moving Bed to Chair 2   Standing Up From Chair Using Arms 2   Walk in Room 1   Climb 3-5 Stairs With Railing 1   Basic Mobility Inpatient Raw Score 10   Turning Head Towards Sound 4   Follow Simple Instructions 4   Low Function Basic Mobility Raw Score 18   Low Function Basic Mobility Standardized Score 29 25   Highest Level Of Mobility   JH-HLM Goal 4: Move to chair/commode   -HLM Achieved 4: Move to chair/commode           Katia Rayo, PT

## 2023-03-30 NOTE — ASSESSMENT & PLAN NOTE
History of peripheral artery disease, status post left femoropopliteal bypass 2021, stenting, restenosis, was supposed to to be on statin and Xarelto outpatient, never smoker, 1 pack a day, and morbid obesity, presented with left lower extremity pain, underwent arteriogram with vascular surgery for potential intervention 3/27 however complete occlusion nonoperable, extubation complicated with attempted but patient had bronchospasm, present for close monitoring at MICU       Per discussion with vascular surgery, patient agreed for left BKA, performed 8/12 with no complications    Plan  Vascular surgery and acute pain following, input appreciated  S/p left BKA 3/29  Per vascular, currently on heparin drip, with potential plan to transition to Xarelto VTE dosing  Per vascular, to continue on aspirin, Plavix was held  Pain control: S/p regional block, as needed acetaminophen, Oxy's, Dilaudid, gabapentin schedule 300 twice daily  Diabetic control, see diabetes A&P  Was evaluated by heme-onc inpatient, will need outpatient follow-up upon discharge

## 2023-03-31 LAB
APTT PPP: 51 SECONDS (ref 23–37)
ERYTHROCYTE [DISTWIDTH] IN BLOOD BY AUTOMATED COUNT: 15.7 % (ref 11.6–15.1)
F2 GENE MUT ANL BLD/T: NORMAL
F5 GENE MUT ANL BLD/T: NORMAL
GLUCOSE SERPL-MCNC: 132 MG/DL (ref 65–140)
GLUCOSE SERPL-MCNC: 179 MG/DL (ref 65–140)
GLUCOSE SERPL-MCNC: 297 MG/DL (ref 65–140)
GLUCOSE SERPL-MCNC: 305 MG/DL (ref 65–140)
HCT VFR BLD AUTO: 27.6 % (ref 34.8–46.1)
HGB BLD-MCNC: 9.3 G/DL (ref 11.5–15.4)
MCH RBC QN AUTO: 25.8 PG (ref 26.8–34.3)
MCHC RBC AUTO-ENTMCNC: 33.7 G/DL (ref 31.4–37.4)
MCV RBC AUTO: 77 FL (ref 82–98)
PLATELET # BLD AUTO: 303 THOUSANDS/UL (ref 149–390)
PMV BLD AUTO: 10.2 FL (ref 8.9–12.7)
RBC # BLD AUTO: 3.61 MILLION/UL (ref 3.81–5.12)
WBC # BLD AUTO: 20.32 THOUSAND/UL (ref 4.31–10.16)

## 2023-03-31 RX ORDER — ALBUTEROL SULFATE 2.5 MG/3ML
2.5 SOLUTION RESPIRATORY (INHALATION) EVERY 4 HOURS PRN
Status: ACTIVE | OUTPATIENT
Start: 2023-03-31

## 2023-03-31 RX ORDER — RIVAROXABAN 15 MG-20MG
KIT ORAL
Qty: 1 EACH | Refills: 0 | OUTPATIENT
Start: 2023-03-31

## 2023-03-31 RX ORDER — INSULIN LISPRO 100 [IU]/ML
10 INJECTION, SOLUTION INTRAVENOUS; SUBCUTANEOUS
Status: ACTIVE | OUTPATIENT
Start: 2023-04-01

## 2023-03-31 RX ORDER — INSULIN GLARGINE 100 [IU]/ML
35 INJECTION, SOLUTION SUBCUTANEOUS
Status: DISPENSED | OUTPATIENT
Start: 2023-03-31

## 2023-03-31 RX ADMIN — IPRATROPIUM BROMIDE 0.5 MG: 0.5 SOLUTION RESPIRATORY (INHALATION) at 07:46

## 2023-03-31 RX ADMIN — LEVALBUTEROL HYDROCHLORIDE 1.25 MG: 1.25 SOLUTION, CONCENTRATE RESPIRATORY (INHALATION) at 02:28

## 2023-03-31 RX ADMIN — ACETAMINOPHEN 975 MG: 325 TABLET ORAL at 05:40

## 2023-03-31 RX ADMIN — LEVALBUTEROL HYDROCHLORIDE 1.25 MG: 1.25 SOLUTION, CONCENTRATE RESPIRATORY (INHALATION) at 13:29

## 2023-03-31 RX ADMIN — INSULIN LISPRO 6 UNITS: 100 INJECTION, SOLUTION INTRAVENOUS; SUBCUTANEOUS at 17:25

## 2023-03-31 RX ADMIN — LEVALBUTEROL HYDROCHLORIDE 1.25 MG: 1.25 SOLUTION, CONCENTRATE RESPIRATORY (INHALATION) at 07:46

## 2023-03-31 RX ADMIN — PREDNISONE 20 MG: 20 TABLET ORAL at 08:15

## 2023-03-31 RX ADMIN — TOPIRAMATE 25 MG: 25 TABLET, FILM COATED ORAL at 17:24

## 2023-03-31 RX ADMIN — FLUTICASONE FUROATE AND VILANTEROL TRIFENATATE 1 PUFF: 200; 25 POWDER RESPIRATORY (INHALATION) at 08:17

## 2023-03-31 RX ADMIN — IPRATROPIUM BROMIDE 0.5 MG: 0.5 SOLUTION RESPIRATORY (INHALATION) at 13:29

## 2023-03-31 RX ADMIN — OXYCODONE HYDROCHLORIDE 10 MG: 10 TABLET ORAL at 00:00

## 2023-03-31 RX ADMIN — HEPARIN SODIUM 19.1 UNITS/KG/HR: 10000 INJECTION, SOLUTION INTRAVENOUS at 04:28

## 2023-03-31 RX ADMIN — INSULIN GLARGINE 35 UNITS: 100 INJECTION, SOLUTION SUBCUTANEOUS at 22:47

## 2023-03-31 RX ADMIN — GABAPENTIN 300 MG: 300 CAPSULE ORAL at 17:23

## 2023-03-31 RX ADMIN — INSULIN LISPRO 2 UNITS: 100 INJECTION, SOLUTION INTRAVENOUS; SUBCUTANEOUS at 08:16

## 2023-03-31 RX ADMIN — PRAZOSIN HYDROCHLORIDE 1 MG: 1 CAPSULE ORAL at 22:23

## 2023-03-31 RX ADMIN — OXYCODONE HYDROCHLORIDE 10 MG: 10 TABLET ORAL at 04:31

## 2023-03-31 RX ADMIN — RIVAROXABAN 15 MG: 15 TABLET, FILM COATED ORAL at 08:15

## 2023-03-31 RX ADMIN — METHOCARBAMOL 500 MG: 500 TABLET ORAL at 23:44

## 2023-03-31 RX ADMIN — CHLORHEXIDINE GLUCONATE 0.12% ORAL RINSE 15 ML: 1.2 LIQUID ORAL at 08:15

## 2023-03-31 RX ADMIN — METHOCARBAMOL 500 MG: 500 TABLET ORAL at 17:23

## 2023-03-31 RX ADMIN — INSULIN GLARGINE 25 UNITS: 100 INJECTION, SOLUTION SUBCUTANEOUS at 08:15

## 2023-03-31 RX ADMIN — OXYCODONE HYDROCHLORIDE 10 MG: 10 TABLET ORAL at 16:40

## 2023-03-31 RX ADMIN — INSULIN LISPRO 8 UNITS: 100 INJECTION, SOLUTION INTRAVENOUS; SUBCUTANEOUS at 22:23

## 2023-03-31 RX ADMIN — OXYCODONE HYDROCHLORIDE 10 MG: 10 TABLET ORAL at 12:21

## 2023-03-31 RX ADMIN — HYDRALAZINE HYDROCHLORIDE 25 MG: 25 TABLET, FILM COATED ORAL at 13:45

## 2023-03-31 RX ADMIN — SENNOSIDES AND DOCUSATE SODIUM 2 TABLET: 8.6; 5 TABLET ORAL at 17:22

## 2023-03-31 RX ADMIN — TOPIRAMATE 25 MG: 25 TABLET, FILM COATED ORAL at 08:17

## 2023-03-31 RX ADMIN — HEPARIN SODIUM 2000 UNITS: 1000 INJECTION INTRAVENOUS; SUBCUTANEOUS at 04:48

## 2023-03-31 RX ADMIN — POLYETHYLENE GLYCOL 3350 17 G: 17 POWDER, FOR SOLUTION ORAL at 08:15

## 2023-03-31 RX ADMIN — HYDROMORPHONE HYDROCHLORIDE 1 MG: 1 INJECTION, SOLUTION INTRAMUSCULAR; INTRAVENOUS; SUBCUTANEOUS at 14:12

## 2023-03-31 RX ADMIN — HYDROMORPHONE HYDROCHLORIDE 1 MG: 1 INJECTION, SOLUTION INTRAMUSCULAR; INTRAVENOUS; SUBCUTANEOUS at 01:24

## 2023-03-31 RX ADMIN — METHOCARBAMOL 500 MG: 500 TABLET ORAL at 05:40

## 2023-03-31 RX ADMIN — MELATONIN 6 MG: at 22:22

## 2023-03-31 RX ADMIN — HYDROMORPHONE HYDROCHLORIDE 1 MG: 1 INJECTION, SOLUTION INTRAMUSCULAR; INTRAVENOUS; SUBCUTANEOUS at 18:22

## 2023-03-31 RX ADMIN — ACETAMINOPHEN 975 MG: 325 TABLET ORAL at 22:22

## 2023-03-31 RX ADMIN — ATORVASTATIN CALCIUM 40 MG: 40 TABLET, FILM COATED ORAL at 17:23

## 2023-03-31 RX ADMIN — HYDROMORPHONE HYDROCHLORIDE 1 MG: 1 INJECTION, SOLUTION INTRAMUSCULAR; INTRAVENOUS; SUBCUTANEOUS at 10:09

## 2023-03-31 RX ADMIN — OXYCODONE HYDROCHLORIDE 10 MG: 10 TABLET ORAL at 08:19

## 2023-03-31 RX ADMIN — HYDROMORPHONE HYDROCHLORIDE 1 MG: 1 INJECTION, SOLUTION INTRAMUSCULAR; INTRAVENOUS; SUBCUTANEOUS at 22:23

## 2023-03-31 RX ADMIN — METHOCARBAMOL 500 MG: 500 TABLET ORAL at 12:21

## 2023-03-31 RX ADMIN — Medication 10 MG: at 13:46

## 2023-03-31 RX ADMIN — HYDRALAZINE HYDROCHLORIDE 25 MG: 25 TABLET, FILM COATED ORAL at 22:22

## 2023-03-31 RX ADMIN — CHLORHEXIDINE GLUCONATE 0.12% ORAL RINSE 15 ML: 1.2 LIQUID ORAL at 22:22

## 2023-03-31 RX ADMIN — RIVAROXABAN 15 MG: 15 TABLET, FILM COATED ORAL at 16:41

## 2023-03-31 RX ADMIN — OXYCODONE HYDROCHLORIDE 10 MG: 10 TABLET ORAL at 20:45

## 2023-03-31 RX ADMIN — ACETAMINOPHEN 975 MG: 325 TABLET ORAL at 13:45

## 2023-03-31 RX ADMIN — ASPIRIN 81 MG CHEWABLE TABLET 81 MG: 81 TABLET CHEWABLE at 08:15

## 2023-03-31 RX ADMIN — HYDROMORPHONE HYDROCHLORIDE 1 MG: 1 INJECTION, SOLUTION INTRAMUSCULAR; INTRAVENOUS; SUBCUTANEOUS at 05:40

## 2023-03-31 RX ADMIN — QUETIAPINE FUMARATE 200 MG: 100 TABLET ORAL at 22:22

## 2023-03-31 RX ADMIN — GABAPENTIN 300 MG: 300 CAPSULE ORAL at 08:16

## 2023-03-31 RX ADMIN — SENNOSIDES AND DOCUSATE SODIUM 2 TABLET: 8.6; 5 TABLET ORAL at 08:16

## 2023-03-31 RX ADMIN — NICOTINE 21 MG: 21 PATCH, EXTENDED RELEASE TRANSDERMAL at 08:15

## 2023-03-31 RX ADMIN — IPRATROPIUM BROMIDE 0.5 MG: 0.5 SOLUTION RESPIRATORY (INHALATION) at 02:26

## 2023-03-31 RX ADMIN — HYDRALAZINE HYDROCHLORIDE 25 MG: 25 TABLET, FILM COATED ORAL at 05:40

## 2023-03-31 NOTE — PLAN OF CARE
Problem: MOBILITY - ADULT  Goal: Maintain or return to baseline ADL function  Description: INTERVENTIONS:  -  Assess patient's ability to carry out ADLs; assess patient's baseline for ADL function and identify physical deficits which impact ability to perform ADLs (bathing, care of mouth/teeth, toileting, grooming, dressing, etc )  - Assess/evaluate cause of self-care deficits   - Assess range of motion  - Assess patient's mobility; develop plan if impaired  - Assess patient's need for assistive devices and provide as appropriate  - Encourage maximum independence but intervene and supervise when necessary  - Involve family in performance of ADLs  - Assess for home care needs following discharge   - Consider OT consult to assist with ADL evaluation and planning for discharge  - Provide patient education as appropriate  Outcome: Progressing  Goal: Maintains/Returns to pre admission functional level  Description: INTERVENTIONS:  - Perform BMAT or MOVE assessment daily    - Set and communicate daily mobility goal to care team and patient/family/caregiver  - Collaborate with rehabilitation services on mobility goals if consulted  - Perform Range of Motion 3 times a day  - Reposition patient every 3 hours    - Dangle patient 3 times a day  - Stand patient 3 times a day  - Ambulate patient 3 times a day  - Out of bed to chair 3 times a day   - Out of bed for meals 3 times a day  - Out of bed for toileting  - Record patient progress and toleration of activity level   Outcome: Progressing     Problem: SAFETY,RESTRAINT: NV/NON-SELF DESTRUCTIVE BEHAVIOR  Goal: Remains free of harm/injury (restraint for non violent/non self-detsructive behavior)  Description: INTERVENTIONS:  - Instruct patient/family regarding restraint use   - Assess and monitor physiologic and psychological status   - Provide interventions and comfort measures to meet assessed patient needs   - Identify and implement measures to help patient regain control  - Assess readiness for release of restraint   Outcome: Progressing  Goal: Returns to optimal restraint-free functioning  Description: INTERVENTIONS:  - Assess the patient's behavior and symptoms that indicate continued need for restraint  - Identify and implement measures to help patient regain control  - Assess readiness for release of restraint   Outcome: Progressing

## 2023-03-31 NOTE — CONSULTS
Consultation - Danis Mari 40 y o  female MRN: [id number]    Unit/Bed#: [facility] 904-01 Encounter: [id number]    Assessment/Plan     Assessment: This is a 40y o -year-old female with 2 diabetes and left lower extremity AKA on 3/29/2023    Plan:  Type 2 diabetes with steroid-induced hyperglycemia  Most recent A1c 9 8  Inpatient regimen: 25 units Lantus twice daily and correctional coverage  Prednisone 20mg was administered this AM with plan for 10mg Saturday and Sunday  Restructure insulin regimen to 35 units of Lantus once daily at bedtime, mealtime insulin 10 units starting with breakfast tomorrow, continue correctional coverage  Continue to monitor blood glucoses and make adjustments as needed over time  CC: Diabetes Consult    History of Present Illness     HPI: Danis Mari is a 40y o  year old female with type 2 diabetes for at least 20 years who initially presented for left lower extremity arteriogram and is now status post AKA on 3/29/2023  She was noted to have bronchospasm during initial attempted extubation and has been treated with steroids on this admission, initially with Solu-Medrol but now on tapering prednisone regimen  She reports having diabetes for more than 20 years, complicated by peripheral arterial disease and neuropathy  She denies stroke, coronary artery disease, retinopathy, and nephropathy  Home regimen, per patient, includes metformin 1000 mg twice daily, Lantus 20 units twice daily, lispro 21 units with each meal, and Trulicity 1 5 mg administered on Wednesday  Patient is currently living out of a hotel and has access to a microwave but no kitchen  She does report excess juice and soda intake but states she switched to water about 1 month ago  She does report pain at the site of her BKA but has no other complaints today  She reports generally poor appetite and often does not eat breakfast at home        Inpatient consult to Endocrinology  Consult performed by: Ellie Hong DO [place]  Consult ordered by: Jesse Bhatti PA-C          Review of Systems   Constitutional: Positive for appetite change  Negative for chills, fatigue and fever  HENT: Negative for rhinorrhea and sore throat  Respiratory: Negative for choking, chest tightness, shortness of breath, wheezing and stridor  Cardiovascular: Negative for chest pain, palpitations and leg swelling  Gastrointestinal: Negative for abdominal pain, blood in stool, constipation, diarrhea, nausea and vomiting  Genitourinary: Negative for hematuria  Neurological: Negative for dizziness and light-headedness  All other systems reviewed and are negative  Historical Information   Past Medical History:   Diagnosis Date   • Asthma    • Atherosclerosis    • Bipolar 1 disorder ([facility] )    • COPD (chronic obstructive pulmonary disease) ([place] [address] )    • Depression    • Diabetes mellitus ([place] [address] )    • Hypertension    • Psychiatric disorder     cutting history   • PTSD (post-traumatic stress disorder)    • Schizoaffective disorder ([place] [address] )    • Tendonitis      Past Surgical History:   Procedure Laterality Date   • AMPUTATION ABOVE KNEE (AKA) Left 3/29/2023    Procedure: AMPUTATION ABOVE KNEE (AKA); Surgeon: Silvio Hopkins MD;  Location: [facility];  Service: Vascular   • BYPASS FEMORAL-POPLITEAL Left 2021    Procedure: Left Common Femoral Below Knee to Popliteal Bypass with Insitu GSV graft    Left Lower Extremity Angiogram;  Surgeon: Silvio Hopkins MD;  Location: [facility];  Service: Vascular   •  SECTION     • EAR SURGERY     • IR LOWER EXTREMITY ANGIOGRAM  2021   • IR LOWER EXTREMITY ANGIOGRAM  2021   • NE SLCTV [address] 3RD+ ORD SLCTV ABDL PEL/LXTR [facility] Left 3/24/2023    Procedure: Left leg angiogram;  Surgeon: Rossy Julio DO;  Location: [facility];  Service: Vascular   • TUBAL LIGATION       Social History   Social History     Substance and Sexual Activity   Alcohol Use Not Currently    Comment: not currently "    Social History     Substance and Sexual Activity   Drug Use Not Currently   • Types: Cocaine, Marijuana, \"Crack\" cocaine    Comment: 1 years clean from crack cocaine since 2022     Social History     Tobacco Use   Smoking Status Every Day   • Packs/day: 1 00   • Years: 20 00   • Pack years: 20 00   • Types: Cigarettes   Smokeless Tobacco Former   • Quit date: 4/29/2022     Family History:   Family History   Problem Relation Age of Onset   • Hypertension Mother    • Diabetes Mother    • HIV Mother    • Heart disease Mother    • No Known Problems Father        Meds/Allergies   Current Facility-Administered Medications   Medication Dose Route Frequency Provider Last Rate Last Admin   • acetaminophen (TYLENOL) tablet 975 mg  975 mg Oral Q8H [address] Cruz Pratt MD   975 mg at 03/31/23 1345   • albuterol (PROVENTIL HFA,VENTOLIN HFA) inhaler 2 puff  2 puff Inhalation Q4H PRN Cruz Pratt MD       • albuterol inhalation solution 2 5 mg  2 5 mg Nebulization Q4H PRN Charisse Jane DO       • aspirin chewable tablet 81 mg  81 mg Oral Daily Cruz Pratt MD   81 mg at 03/31/23 0815   • atorvastatin (LIPITOR) tablet 40 mg  40 mg Oral Daily With Liam Ornelas MD   40 mg at 03/30/23 1659   • bisacodyl (DULCOLAX) rectal suppository 10 mg  10 mg Rectal Daily PRN Cruz Pratt MD   10 mg at 03/31/23 1346   • chlorhexidine (PERIDEX) 0 12 % oral rinse 15 mL  15 mL Mouth/Throat Q12H [address] Cruz Pratt MD   15 mL at 03/31/23 0815   • fluticasone-vilanterol 200-25 mcg/actuation 1 puff  1 puff Inhalation Daily Cruz Pratt MD   1 puff at 03/31/23 0817   • gabapentin (NEURONTIN) capsule 300 mg  300 mg Oral BID Cruz Pratt MD   300 mg at 03/31/23 0816   • hydrALAZINE (APRESOLINE) injection 10 mg  10 mg Intravenous Q6H PRN Cruz Pratt MD   10 mg at 03/27/23 1951   • hydrALAZINE (APRESOLINE) tablet 25 mg  25 mg Oral Q8H [address] Ricky ÁLVAREZ " Clay Ann MD   25 mg at 03/31/23 1345   • HYDROmorphone (DILAUDID) injection 1 mg  1 mg Intravenous Q4H PRN Davian Lamar MD   1 mg at 03/31/23 1412   • insulin glargine (LANTUS) subcutaneous injection 25 Units 0 25 mL  25 Units Subcutaneous Q12H [facility] Davian Lamar MD   25 Units at 03/31/23 0815   • insulin lispro (HumaLOG) 100 units/mL subcutaneous injection 2-12 Units  2-12 Units Subcutaneous TID AC Davian Lamar MD   2 Units at 03/31/23 0816   • insulin lispro (HumaLOG) 100 units/mL subcutaneous injection 2-12 Units  2-12 Units Subcutaneous HS Davian Lamar MD   6 Units at 03/30/23 2111   • melatonin tablet 6 mg  6 mg Oral HS Davian Lamar MD   6 mg at 03/30/23 2109   • methocarbamol (ROBAXIN) tablet 500 mg  500 mg Oral Q6H [facility] Clay Ann MD   500 mg at 03/31/23 1221   • naloxone (NARCAN) 0 04 mg/mL syringe 0 04 mg  0 04 mg Intravenous Q1MIN PRN Davian Lamar MD       • nicotine (NICODERM CQ) 21 mg/24 hr TD 24 hr patch 21 mg  21 mg Transdermal Daily Davian Lamar MD   21 mg at 03/31/23 0815   • oxyCODONE (ROXICODONE) immediate release tablet 10 mg  10 mg Oral Q4H PRN Davian Lamar MD   10 mg at 03/31/23 1221   • oxyCODONE (ROXICODONE) IR tablet 5 mg  5 mg Oral Q4H PRN Davian Lamar MD       • polyethylene glycol (MIRALAX) packet 17 g  17 g Oral Daily Davian Lamar MD   17 g at 03/31/23 0815   • prazosin (MINIPRESS) capsule 1 mg  1 mg Oral HS Davian Lamar MD       • [START ON 4/1/2023] predniSONE tablet 10 mg  10 mg Oral Daily Davian Lamar MD       • QUEtiapine (SEROquel) tablet 200 mg  200 mg Oral HS Davian Lamar MD   200 mg at 03/30/23 2109   • rivaroxaban (XARELTO) tablet 15 mg  15 mg Oral BID With Meals Cezar Escalona PA-C   15 mg at 03/31/23 0815   • senna-docusate sodium (SENOKOT S) 8 6-50 mg per tablet 2 tablet  2 tablet Oral BID Davian Lamar MD   2 "tablet at 03/31/23 0816   • simethicone (MYLICON) chewable tablet 80 mg  80 mg Oral Q6H PRN Jewell Segovia MD   80 mg at 03/27/23 2144   • topiramate (TOPAMAX) tablet 25 mg  25 mg Oral BID Jewell Segovia MD   25 mg at 03/31/23 0817     No Known Allergies    Objective   Vitals: Blood pressure 126/80, pulse (!) 109, temperature (!) 97 3 °F (36 3 °C), resp  rate (!) 24, height 5' 1\" (1 549 m), weight 127 kg (280 lb 6 8 oz), SpO2 93 %, not currently breastfeeding  No intake or output data in the 24 hours ending 03/31/23 1523  Invasive Devices     Peripherally Inserted Central Catheter Line  Duration           PICC Line 35/94/61 Right Basilic 6 days                Physical Exam  Vitals reviewed  Constitutional:       Appearance: She is well-developed  HENT:      Head: Normocephalic and atraumatic  Eyes:      General:         Right eye: No discharge  Left eye: No discharge  Cardiovascular:      Rate and Rhythm: Normal rate and regular rhythm  Heart sounds: Normal heart sounds  No murmur heard  No friction rub  No gallop  Pulmonary:      Effort: Pulmonary effort is normal  No respiratory distress  Breath sounds: Normal breath sounds  No wheezing or rales  Chest:      Chest wall: No tenderness  Abdominal:      General: Bowel sounds are normal  There is no distension  Palpations: Abdomen is soft  There is no mass  Tenderness: There is no abdominal tenderness  Musculoskeletal:         General: Normal range of motion  Cervical back: Normal range of motion and neck supple  Comments: L AKA   Skin:     General: Skin is warm and dry  Neurological:      Mental Status: She is alert and oriented to person, place, and time  Psychiatric:         Behavior: Behavior normal          The history was obtained from the review of the chart, patient      Lab Results:       Lab Results   Component Value Date    WBC 20 32 (H) 03/31/2023    HGB 9 3 (L) 03/31/2023    " HCT 27 6 (L) 03/31/2023    MCV 77 (L) 03/31/2023     03/31/2023     Lab Results   Component Value Date/Time    BUN 16 03/30/2023 06:12 AM    K 3 7 03/30/2023 06:12 AM     03/30/2023 06:12 AM    CO2 28 03/30/2023 06:12 AM    CO2 26 09/14/2021 05:40 PM    CREATININE 0 44 (L) 03/30/2023 06:12 AM    AST 58 (H) 03/30/2023 06:12 AM    ALT 69 03/30/2023 06:12 AM    ALB 2 0 (L) 03/30/2023 06:12 AM     Recent Labs     03/29/23  0511   HDL 54   TRIG 84     No results found for: MICROALBUR, [id number]  POC Glucose (mg/dl)   Date Value   03/31/2023 132   03/31/2023 179 (H)   03/30/2023 275 (H)   03/30/2023 299 (H)   03/30/2023 349 (H)   03/30/2023 197 (H)   03/29/2023 450 (H)   03/29/2023 372 (H)   03/29/2023 111   03/28/2023 282 (H)       Imaging Studies: I have personally reviewed pertinent reports  Portions of the record may have been created with voice recognition software

## 2023-03-31 NOTE — PROGRESS NOTES
PMR Quick Note:    Notes/therapy, vitals, and labs reviewed  Patient POD#1 from [facility]; she was understandably limited today in rehab due to fatigue and pain  She remains on heparin drip with plan to transition to Xarelto tomorrow  Recommend ARC v SNF early next week pending activity tolerance and medical stability at that time

## 2023-03-31 NOTE — ARC ADMISSION
Reviewed updates with [facility] physician - will continue to follow patient's case through the weekend, monitoring for medical stability and functional progress, as well as improved acute pain management  However, patient may ultimately require SNF/subacute rehab placement if needing to D/C home at [facility] level (motel room unable to accommodate [facility] mobility)  CM has been updated

## 2023-03-31 NOTE — PROGRESS NOTES
Cardiology Progress Note - Dulce Chandler 40 y o  female MRN: [id number]    Unit/Bed#: [facility] 904-01 Encounter: [id number]        Hospital Problems:  Principal Problem:    Acute respiratory distress  Active Problems:    Essential hypertension    Type 2 diabetes mellitus with diabetic polyneuropathy, with long-term current use of insulin ([facility])    Nicotine dependence    Bipolar disorder ([facility])    PAD (peripheral artery disease) ([facility])    BMI 45 0-49 9, adult ([facility])    S/P BKA (below knee amputation) unilateral, left ([facility])    Atherosclerosis of left leg ([facility])      Assessment/Recommendations:    Critical limb ischemia with no revascularization options: Status post successful left lower extremity above-knee amputation  Incision site is stable as per vascular surgery documentation  Pain is controlled  Advised long-term anticoagulation by vascular surgery  She will be on a combination of low-dose aspirin and Xarelto  Continue statins  Continue risk factor modification      Previous history of an abnormal, low risk nuclear study with basal to mid anterior ischemia, medically managed   No ongoing angina reported  Outpatient cardiology follow-up as scheduled      Hypertension: Blood pressure previously was severely elevated upon arrival   Developed hypotension with hydralazine  Blood pressure more elevated yesterday in the setting of pain  Oral hydralazine resumed  Tobacco dependence: Encouraged abstinence from tobacco use  Outpatient cardiology follow-up will be with Dr Joesph Pereira  Follow-up will be arranged in June, hopefully after she is done with rehab  Message sent to the clinic  Thank you for allowing us to participate in the care of this patient  If there are any further questions regarding this patient please feel free to contact us  Subjective:     Overnight events reviewed  Seen postoperatively, status post above-knee amputation  States that her pain is overall controlled    She is looking forward to start rehab  Medical therapy reviewed  Review of Systems   Constitutional: Positive for fatigue  Respiratory: Negative for shortness of breath and wheezing  Cardiovascular: Negative for chest pain and palpitations  Musculoskeletal: Positive for gait problem and myalgias  Skin: Positive for wound  Neurological: Positive for numbness  Psychiatric/Behavioral: Positive for sleep disturbance  Review of ten system was conducted and was negative except for findings stated elsewhere in the note          Current Facility-Administered Medications:   •  acetaminophen (TYLENOL) tablet 975 mg, 975 mg, Oral, Q8H [address], Misty Johnson MD, 975 mg at 03/31/23 0540  •  albuterol (PROVENTIL HFA,VENTOLIN HFA) inhaler 2 puff, 2 puff, Inhalation, Q4H PRN, Misty Johnson MD  •  aspirin chewable tablet 81 mg, 81 mg, Oral, Daily, Misty Johnson MD, 81 mg at 03/31/23 0815  •  atorvastatin (LIPITOR) tablet 40 mg, 40 mg, Oral, Daily With Karina Moncada MD, 40 mg at 03/30/23 1659  •  bisacodyl (DULCOLAX) rectal suppository 10 mg, 10 mg, Rectal, Daily PRN, Misty Johnson MD  •  chlorhexidine (PERIDEX) 0 12 % oral rinse 15 mL, 15 mL, Mouth/Throat, Q12H [address], Misty Johnson MD, 15 mL at 03/31/23 0815  •  fluticasone-vilanterol 200-25 mcg/actuation 1 puff, 1 puff, Inhalation, Daily, Misty Johnson MD, 1 puff at 03/31/23 3308  •  gabapentin (NEURONTIN) capsule 300 mg, 300 mg, Oral, BID, Misty Johnson MD, 300 mg at 03/31/23 0816  •  hydrALAZINE (APRESOLINE) injection 10 mg, 10 mg, Intravenous, Q6H PRN, Misty Johnson MD, 10 mg at 03/27/23 1951  •  hydrALAZINE (APRESOLINE) tablet 25 mg, 25 mg, Oral, Q8H [address], Misty Johnson MD, 25 mg at 03/31/23 0540  •  HYDROmorphone (DILAUDID) injection 1 mg, 1 mg, Intravenous, Q4H PRN, Misty Johnson MD, 1 mg at 03/31/23 3772  •  insulin glargine (LANTUS) subcutaneous injection 25 Units 0 25 mL, 25 Units, Subcutaneous, Q12H [address], Cruz Pratt MD, 25 Units at 03/31/23 0815  •  insulin lispro (HumaLOG) 100 units/mL subcutaneous injection 2-12 Units, 2-12 Units, Subcutaneous, TID AC, 2 Units at 03/31/23 0816 **AND** Fingerstick Glucose (POCT), , , 4x Daily AC and at bedtime, Cruz Pratt MD  •  insulin lispro (HumaLOG) 100 units/mL subcutaneous injection 2-12 Units, 2-12 Units, Subcutaneous, HS, Cruz Pratt MD, 6 Units at 03/30/23 2111  •  ipratropium (ATROVENT) 0 02 % inhalation solution 0 5 mg, 0 5 mg, Nebulization, Q6H, Cruz Pratt MD, 0 5 mg at 03/31/23 0746  •  levalbuterol (XOPENEX) inhalation solution 1 25 mg, 1 25 mg, Nebulization, Q6H, Cruz Pratt MD, 1 25 mg at 03/31/23 0746  •  melatonin tablet 6 mg, 6 mg, Oral, HS, Cruz Pratt MD, 6 mg at 03/30/23 2109  •  methocarbamol (ROBAXIN) tablet 500 mg, 500 mg, Oral, Q6H [address], Cruz Pratt MD, 500 mg at 03/31/23 0540  •  naloxone (NARCAN) 0 04 mg/mL syringe 0 04 mg, 0 04 mg, Intravenous, Q1MIN PRN, Cruz Pratt MD  •  nicotine (NICODERM CQ) 21 mg/24 hr TD 24 hr patch 21 mg, 21 mg, Transdermal, Daily, Cruz Pratt MD, 21 mg at 03/31/23 9558  •  oxyCODONE (ROXICODONE) immediate release tablet 10 mg, 10 mg, Oral, Q4H PRN, Cruz Pratt MD, 10 mg at 03/31/23 3160  •  oxyCODONE (ROXICODONE) IR tablet 5 mg, 5 mg, Oral, Q4H PRN, Cruz Pratt MD  •  polyethylene glycol (MIRALAX) packet 17 g, 17 g, Oral, Daily, Cruz Pratt MD, 17 g at 03/31/23 0815  •  prazosin (MINIPRESS) capsule 1 mg, 1 mg, Oral, HS, Cruz Pratt MD  •  [START ON 4/1/2023] predniSONE tablet 10 mg, 10 mg, Oral, Daily, Cruz Pratt MD  •  QUEtiapine (SEROquel) tablet 200 mg, 200 mg, Oral, HS, Cruz Pratt MD, 200 mg at 03/30/23 2109  •  rivaroxaban (XARELTO) tablet 15 mg, 15 mg, Oral, BID With Meals, Cezar Escalona, "PA-C, 15 mg at 03/31/23 0815  •  senna-docusate sodium (SENOKOT S) 8 6-50 mg per tablet 2 tablet, 2 tablet, Oral, BID, Rheta Krabbe, MD, 2 tablet at 03/31/23 0816  •  simethicone (MYLICON) chewable tablet 80 mg, 80 mg, Oral, Q6H PRN, Rheta Krabbe, MD, 80 mg at 03/27/23 2144  •  topiramate (TOPAMAX) tablet 25 mg, 25 mg, Oral, BID, Rheta Krabbe, MD, 25 mg at 03/31/23 0817     Objective:     Vitals:   Blood pressure 154/93, pulse (!) 109, temperature 97 7 °F (36 5 °C), resp  rate (!) 24, height 5' 1\" (1 549 m), weight 127 kg (280 lb 6 8 oz), SpO2 100 %, not currently breastfeeding  Body mass index is 52 99 kg/m²  Orthostatic Blood Pressures    Flowsheet Row Most Recent Value   Blood Pressure 154/93 filed at 03/31/2023 8407   Patient Position - Orthostatic VS Lying filed at 03/30/2023 3394         Systolic (67IQF), RVA:262 , Min:85 , JFV:155     Diastolic (71ROO), VHU:43, Min:62, Max:103      Intake/Output Summary (Last 24 hours) at 3/31/2023 0931  Last data filed at 3/30/2023 1301  Gross per 24 hour   Intake 695 43 ml   Output 200 ml   Net 495 43 ml     Weight (last 2 days)     Date/Time Weight    03/29/23 0500 127 (280 43)            Physical Exam  Vitals reviewed  Constitutional:       Appearance: She is obese  HENT:      Head: Normocephalic  Cardiovascular:      Rate and Rhythm: Normal rate and regular rhythm  Pulmonary:      Breath sounds: No wheezing  Skin:     General: Skin is warm  Comments: Amputation stump wound images reviewed from vascular surgery notes   Neurological:      Mental Status: She is alert  Mental status is at baseline     Psychiatric:         Mood and Affect: Mood normal            Labs & Results:    Lab Results   Component Value Date    SODIUM 136 03/30/2023    K 3 7 03/30/2023     03/30/2023    CO2 28 03/30/2023    BUN 16 03/30/2023    CREATININE 0 44 (L) 03/30/2023    GLUC 196 (H) 03/30/2023    CALCIUM 8 1 (L) 03/30/2023     Lab Results " "  Component Value Date    WBC 20 32 (H) 03/31/2023    RBC 3 61 (L) 03/31/2023    HGB 9 3 (L) 03/31/2023    HCT 27 6 (L) 03/31/2023    MCV 77 (L) 03/31/2023    MCH 25 8 (L) 03/31/2023    RDW 15 7 (H) 03/31/2023     03/31/2023     Results from last 7 days   Lab Units 03/31/23  0358 03/30/23 2005 03/30/23  1254 03/27/23  2215 03/27/23  1609 03/24/23  1543   PTT seconds 51* 53* 49*   < > 26 21*  21*   INR   --   --   --   --  1 01 0 99    < > = values in this interval not displayed  Lab Results   Component Value Date    LDLCALC 106 (H) 03/29/2023     Lab Results   Component Value Date    [id number] 2 030 08/23/2021           Available cardiac and imaging studies were reviewed independently  Available cardiology studies, imaging and lab results independently reviewed today  This note was completed in part utilizing voice recognition software  Grammatical errors, random word insertion, spelling mistakes, occasional wrong word or \"sound-alike\" substitutions and incomplete sentences may be an occasional consequence of the system secondary to software limitations, ambient noise and hardware issues  At the time of dictation, efforts were made to edit, clarify and /or correct errors  Please read the chart carefully and recognize, using context, where substitutions have occurred  If you have any questions or concerns about the context, text or information contained within the body of this dictation, please contact myself, the provider, for further clarification        "

## 2023-03-31 NOTE — PROGRESS NOTES
"Progress Note - Vascular Surgery   Nelida Israel 40 y o  female MRN: [id number]  Unit/Bed#: [facility] 904-01 Encounter: [id number]    Assessment:  40 y o  F with history of obesity, DM, COPD, PAD with h/o  L SFA PTA/stent 8/11/2021, subsequent stent thrombectomy 8/25/2021 and ultimate  L CFA to BK-pop bypass with in situ GSV 9/14/21 with decreased NIKKI and elevated velocities in proximal anastomosis, now s/p LLE Agram which revealed an occluded left fem-BK pop bypass, no outflow into the foot  Now s/p L AKA 3/29    Initial postoperative course was complicated by prolonged intubation for bronchospasm    Plan:  · Dressing changed today-- see below for pictures  · Okay for Xarelto  · Continue ASA/Statin   · APS for pain control  · Tight glucose control  · PT/OT- ARC vs SNF  · Wound check tomorrow  · Remainder of care per primary team      Subjective/Objective     Subjective:   NAEO  Pain controlled  No fevers or chills  No shortness of breath  Objective:     Blood pressure 154/93, pulse (!) 109, temperature 97 7 °F (36 5 °C), resp  rate (!) 24, height 5' 1\" (1 549 m), weight 127 kg (280 lb 6 8 oz), SpO2 91 %, not currently breastfeeding  ,Body mass index is 52 99 kg/m²  Intake/Output Summary (Last 24 hours) at 3/31/2023 0653  Last data filed at 3/30/2023 1301  Gross per 24 hour   Intake 695 43 ml   Output 200 ml   Net 495 43 ml       Invasive Devices     Peripherally Inserted Central Catheter Line  Duration           PICC Line 55/79/62 Right Basilic 5 days              Physical Exam:   Gen:  NAD  HEENT: NCAT  MMM  CV: well perfused  Lungs: Normal respiratory effort  Abd: soft, nt/nd  Skin: warm/ dry  Extremities: no peripheral edema, no clubbing or cyanosis, stump cdi  Incision well approximated  No hematoma  Neuro:  AxO x3, sensorimotor intact            Lab, Imaging and other studies:  CBC:   Lab Results   Component Value Date    WBC 20 32 (H) 03/31/2023    HGB 9 3 (L) 03/31/2023    HCT 27 6 (L) 03/31/2023 " MCV 77 (L) 03/31/2023     03/31/2023    MCH 25 8 (L) 03/31/2023    MCHC 33 7 03/31/2023    RDW 15 7 (H) 03/31/2023    MPV 10 2 03/31/2023   , CMP:   No results found for: SODIUM, K, CL, CO2, ANIONGAP, BUN, CREATININE, GLUCOSE, CALCIUM, AST, ALT, ALKPHOS, PROT, BILITOT, EGFR  VTE Pharmacologic Prophylaxis: Heparin gtt  VTE Mechanical Prophylaxis: sequential compression device

## 2023-04-01 LAB
GLUCOSE SERPL-MCNC: 118 MG/DL (ref 65–140)
GLUCOSE SERPL-MCNC: 163 MG/DL (ref 65–140)
GLUCOSE SERPL-MCNC: 174 MG/DL (ref 65–140)
GLUCOSE SERPL-MCNC: 175 MG/DL (ref 65–140)

## 2023-04-01 RX ADMIN — INSULIN LISPRO 2 UNITS: 100 INJECTION, SOLUTION INTRAVENOUS; SUBCUTANEOUS at 08:54

## 2023-04-01 RX ADMIN — HYDRALAZINE HYDROCHLORIDE 25 MG: 25 TABLET, FILM COATED ORAL at 14:30

## 2023-04-01 RX ADMIN — INSULIN LISPRO 10 UNITS: 100 INJECTION, SOLUTION INTRAVENOUS; SUBCUTANEOUS at 12:00

## 2023-04-01 RX ADMIN — ACETAMINOPHEN 975 MG: 325 TABLET ORAL at 22:22

## 2023-04-01 RX ADMIN — OXYCODONE HYDROCHLORIDE 10 MG: 10 TABLET ORAL at 23:23

## 2023-04-01 RX ADMIN — METHOCARBAMOL 500 MG: 500 TABLET ORAL at 23:23

## 2023-04-01 RX ADMIN — INSULIN GLARGINE 35 UNITS: 100 INJECTION, SOLUTION SUBCUTANEOUS at 22:22

## 2023-04-01 RX ADMIN — GABAPENTIN 300 MG: 300 CAPSULE ORAL at 08:48

## 2023-04-01 RX ADMIN — INSULIN LISPRO 2 UNITS: 100 INJECTION, SOLUTION INTRAVENOUS; SUBCUTANEOUS at 18:13

## 2023-04-01 RX ADMIN — METHOCARBAMOL 500 MG: 500 TABLET ORAL at 11:58

## 2023-04-01 RX ADMIN — OXYCODONE HYDROCHLORIDE 10 MG: 10 TABLET ORAL at 14:37

## 2023-04-01 RX ADMIN — POLYETHYLENE GLYCOL 3350 17 G: 17 POWDER, FOR SOLUTION ORAL at 08:47

## 2023-04-01 RX ADMIN — HYDROMORPHONE HYDROCHLORIDE 1 MG: 1 INJECTION, SOLUTION INTRAMUSCULAR; INTRAVENOUS; SUBCUTANEOUS at 20:18

## 2023-04-01 RX ADMIN — ASPIRIN 81 MG CHEWABLE TABLET 81 MG: 81 TABLET CHEWABLE at 08:48

## 2023-04-01 RX ADMIN — RIVAROXABAN 15 MG: 15 TABLET, FILM COATED ORAL at 08:48

## 2023-04-01 RX ADMIN — HYDROMORPHONE HYDROCHLORIDE 1 MG: 1 INJECTION, SOLUTION INTRAMUSCULAR; INTRAVENOUS; SUBCUTANEOUS at 16:57

## 2023-04-01 RX ADMIN — INSULIN LISPRO 2 UNITS: 100 INJECTION, SOLUTION INTRAVENOUS; SUBCUTANEOUS at 22:22

## 2023-04-01 RX ADMIN — CHLORHEXIDINE GLUCONATE 0.12% ORAL RINSE 15 ML: 1.2 LIQUID ORAL at 22:26

## 2023-04-01 RX ADMIN — ATORVASTATIN CALCIUM 40 MG: 40 TABLET, FILM COATED ORAL at 18:13

## 2023-04-01 RX ADMIN — METHOCARBAMOL 500 MG: 500 TABLET ORAL at 18:16

## 2023-04-01 RX ADMIN — PRAZOSIN HYDROCHLORIDE 1 MG: 1 CAPSULE ORAL at 22:21

## 2023-04-01 RX ADMIN — PREDNISONE 10 MG: 10 TABLET ORAL at 08:48

## 2023-04-01 RX ADMIN — HYDROMORPHONE HYDROCHLORIDE 1 MG: 1 INJECTION, SOLUTION INTRAMUSCULAR; INTRAVENOUS; SUBCUTANEOUS at 08:46

## 2023-04-01 RX ADMIN — MELATONIN 6 MG: at 22:22

## 2023-04-01 RX ADMIN — SENNOSIDES AND DOCUSATE SODIUM 2 TABLET: 8.6; 5 TABLET ORAL at 08:48

## 2023-04-01 RX ADMIN — HYDRALAZINE HYDROCHLORIDE 25 MG: 25 TABLET, FILM COATED ORAL at 05:52

## 2023-04-01 RX ADMIN — NICOTINE 21 MG: 21 PATCH, EXTENDED RELEASE TRANSDERMAL at 20:20

## 2023-04-01 RX ADMIN — OXYCODONE HYDROCHLORIDE 10 MG: 10 TABLET ORAL at 18:50

## 2023-04-01 RX ADMIN — OXYCODONE HYDROCHLORIDE 10 MG: 10 TABLET ORAL at 10:23

## 2023-04-01 RX ADMIN — HYDROMORPHONE HYDROCHLORIDE 1 MG: 1 INJECTION, SOLUTION INTRAMUSCULAR; INTRAVENOUS; SUBCUTANEOUS at 12:52

## 2023-04-01 RX ADMIN — CHLORHEXIDINE GLUCONATE 0.12% ORAL RINSE 15 ML: 1.2 LIQUID ORAL at 08:47

## 2023-04-01 RX ADMIN — RIVAROXABAN 15 MG: 15 TABLET, FILM COATED ORAL at 18:13

## 2023-04-01 RX ADMIN — GABAPENTIN 300 MG: 300 CAPSULE ORAL at 18:13

## 2023-04-01 RX ADMIN — NICOTINE 21 MG: 21 PATCH, EXTENDED RELEASE TRANSDERMAL at 08:47

## 2023-04-01 RX ADMIN — INSULIN LISPRO 10 UNITS: 100 INJECTION, SOLUTION INTRAVENOUS; SUBCUTANEOUS at 08:54

## 2023-04-01 RX ADMIN — FLUTICASONE FUROATE AND VILANTEROL TRIFENATATE 1 PUFF: 200; 25 POWDER RESPIRATORY (INHALATION) at 08:50

## 2023-04-01 RX ADMIN — OXYCODONE HYDROCHLORIDE 10 MG: 10 TABLET ORAL at 05:52

## 2023-04-01 RX ADMIN — QUETIAPINE FUMARATE 200 MG: 100 TABLET ORAL at 22:21

## 2023-04-01 RX ADMIN — METHOCARBAMOL 500 MG: 500 TABLET ORAL at 05:52

## 2023-04-01 RX ADMIN — TOPIRAMATE 25 MG: 25 TABLET, FILM COATED ORAL at 18:14

## 2023-04-01 RX ADMIN — INSULIN LISPRO 10 UNITS: 100 INJECTION, SOLUTION INTRAVENOUS; SUBCUTANEOUS at 18:12

## 2023-04-01 RX ADMIN — ACETAMINOPHEN 975 MG: 325 TABLET ORAL at 14:30

## 2023-04-01 RX ADMIN — ACETAMINOPHEN 975 MG: 325 TABLET ORAL at 05:52

## 2023-04-01 RX ADMIN — HYDRALAZINE HYDROCHLORIDE 25 MG: 25 TABLET, FILM COATED ORAL at 22:21

## 2023-04-01 RX ADMIN — TOPIRAMATE 25 MG: 25 TABLET, FILM COATED ORAL at 08:50

## 2023-04-01 RX ADMIN — SENNOSIDES AND DOCUSATE SODIUM 2 TABLET: 8.6; 5 TABLET ORAL at 18:13

## 2023-04-02 VITALS
TEMPERATURE: 97.7 F | RESPIRATION RATE: 18 BRPM | OXYGEN SATURATION: 99 % | BODY MASS INDEX: 51.03 KG/M2 | HEIGHT: 61 IN | DIASTOLIC BLOOD PRESSURE: 85 MMHG | WEIGHT: 270.28 LBS | HEART RATE: 98 BPM | SYSTOLIC BLOOD PRESSURE: 147 MMHG

## 2023-04-02 LAB
GLUCOSE SERPL-MCNC: 113 MG/DL (ref 65–140)
GLUCOSE SERPL-MCNC: 187 MG/DL (ref 65–140)
GLUCOSE SERPL-MCNC: 239 MG/DL (ref 65–140)
GLUCOSE SERPL-MCNC: 241 MG/DL (ref 65–140)

## 2023-04-02 RX ADMIN — HYDROMORPHONE HYDROCHLORIDE 1 MG: 1 INJECTION, SOLUTION INTRAMUSCULAR; INTRAVENOUS; SUBCUTANEOUS at 12:47

## 2023-04-02 RX ADMIN — ACETAMINOPHEN 975 MG: 325 TABLET ORAL at 21:37

## 2023-04-02 RX ADMIN — TOPIRAMATE 25 MG: 25 TABLET, FILM COATED ORAL at 17:23

## 2023-04-02 RX ADMIN — ACETAMINOPHEN 975 MG: 325 TABLET ORAL at 05:39

## 2023-04-02 RX ADMIN — METHOCARBAMOL 500 MG: 500 TABLET ORAL at 12:46

## 2023-04-02 RX ADMIN — HYDROMORPHONE HYDROCHLORIDE 1 MG: 1 INJECTION, SOLUTION INTRAMUSCULAR; INTRAVENOUS; SUBCUTANEOUS at 05:39

## 2023-04-02 RX ADMIN — ATORVASTATIN CALCIUM 40 MG: 40 TABLET, FILM COATED ORAL at 17:22

## 2023-04-02 RX ADMIN — INSULIN LISPRO 10 UNITS: 100 INJECTION, SOLUTION INTRAVENOUS; SUBCUTANEOUS at 08:40

## 2023-04-02 RX ADMIN — MELATONIN 6 MG: at 21:37

## 2023-04-02 RX ADMIN — INSULIN LISPRO 10 UNITS: 100 INJECTION, SOLUTION INTRAVENOUS; SUBCUTANEOUS at 12:44

## 2023-04-02 RX ADMIN — ASPIRIN 81 MG CHEWABLE TABLET 81 MG: 81 TABLET CHEWABLE at 08:43

## 2023-04-02 RX ADMIN — FLUTICASONE FUROATE AND VILANTEROL TRIFENATATE 1 PUFF: 200; 25 POWDER RESPIRATORY (INHALATION) at 08:44

## 2023-04-02 RX ADMIN — CHLORHEXIDINE GLUCONATE 0.12% ORAL RINSE 15 ML: 1.2 LIQUID ORAL at 21:37

## 2023-04-02 RX ADMIN — METHOCARBAMOL 500 MG: 500 TABLET ORAL at 17:21

## 2023-04-02 RX ADMIN — INSULIN LISPRO 4 UNITS: 100 INJECTION, SOLUTION INTRAVENOUS; SUBCUTANEOUS at 17:18

## 2023-04-02 RX ADMIN — INSULIN LISPRO 10 UNITS: 100 INJECTION, SOLUTION INTRAVENOUS; SUBCUTANEOUS at 17:20

## 2023-04-02 RX ADMIN — HYDRALAZINE HYDROCHLORIDE 25 MG: 25 TABLET, FILM COATED ORAL at 15:26

## 2023-04-02 RX ADMIN — OXYCODONE HYDROCHLORIDE 10 MG: 10 TABLET ORAL at 15:26

## 2023-04-02 RX ADMIN — QUETIAPINE FUMARATE 200 MG: 100 TABLET ORAL at 21:37

## 2023-04-02 RX ADMIN — GABAPENTIN 300 MG: 300 CAPSULE ORAL at 08:42

## 2023-04-02 RX ADMIN — HYDRALAZINE HYDROCHLORIDE 25 MG: 25 TABLET, FILM COATED ORAL at 05:39

## 2023-04-02 RX ADMIN — HYDROMORPHONE HYDROCHLORIDE 1 MG: 1 INJECTION, SOLUTION INTRAMUSCULAR; INTRAVENOUS; SUBCUTANEOUS at 17:15

## 2023-04-02 RX ADMIN — RIVAROXABAN 15 MG: 15 TABLET, FILM COATED ORAL at 08:44

## 2023-04-02 RX ADMIN — HYDROMORPHONE HYDROCHLORIDE 1 MG: 1 INJECTION, SOLUTION INTRAMUSCULAR; INTRAVENOUS; SUBCUTANEOUS at 00:26

## 2023-04-02 RX ADMIN — SENNOSIDES AND DOCUSATE SODIUM 2 TABLET: 8.6; 5 TABLET ORAL at 17:21

## 2023-04-02 RX ADMIN — RIVAROXABAN 15 MG: 15 TABLET, FILM COATED ORAL at 17:22

## 2023-04-02 RX ADMIN — POLYETHYLENE GLYCOL 3350 17 G: 17 POWDER, FOR SOLUTION ORAL at 08:37

## 2023-04-02 RX ADMIN — SENNOSIDES AND DOCUSATE SODIUM 2 TABLET: 8.6; 5 TABLET ORAL at 08:42

## 2023-04-02 RX ADMIN — INSULIN LISPRO 2 UNITS: 100 INJECTION, SOLUTION INTRAVENOUS; SUBCUTANEOUS at 08:39

## 2023-04-02 RX ADMIN — OXYCODONE HYDROCHLORIDE 10 MG: 10 TABLET ORAL at 08:45

## 2023-04-02 RX ADMIN — INSULIN LISPRO 4 UNITS: 100 INJECTION, SOLUTION INTRAVENOUS; SUBCUTANEOUS at 21:37

## 2023-04-02 RX ADMIN — PREDNISONE 10 MG: 10 TABLET ORAL at 08:43

## 2023-04-02 RX ADMIN — ACETAMINOPHEN 975 MG: 325 TABLET ORAL at 15:26

## 2023-04-02 RX ADMIN — HYDROMORPHONE HYDROCHLORIDE 1 MG: 1 INJECTION, SOLUTION INTRAMUSCULAR; INTRAVENOUS; SUBCUTANEOUS at 21:38

## 2023-04-02 RX ADMIN — HYDRALAZINE HYDROCHLORIDE 25 MG: 25 TABLET, FILM COATED ORAL at 21:37

## 2023-04-02 RX ADMIN — CHLORHEXIDINE GLUCONATE 0.12% ORAL RINSE 15 ML: 1.2 LIQUID ORAL at 08:43

## 2023-04-02 RX ADMIN — PRAZOSIN HYDROCHLORIDE 1 MG: 1 CAPSULE ORAL at 21:37

## 2023-04-02 RX ADMIN — GABAPENTIN 300 MG: 300 CAPSULE ORAL at 17:21

## 2023-04-02 RX ADMIN — TOPIRAMATE 25 MG: 25 TABLET, FILM COATED ORAL at 08:44

## 2023-04-02 RX ADMIN — NICOTINE 21 MG: 21 PATCH, EXTENDED RELEASE TRANSDERMAL at 08:41

## 2023-04-02 RX ADMIN — OXYCODONE HYDROCHLORIDE 10 MG: 10 TABLET ORAL at 03:33

## 2023-04-02 RX ADMIN — METHOCARBAMOL 500 MG: 500 TABLET ORAL at 05:39

## 2023-04-02 RX ADMIN — INSULIN GLARGINE 35 UNITS: 100 INJECTION, SOLUTION SUBCUTANEOUS at 21:37

## 2023-04-02 RX ADMIN — OXYCODONE HYDROCHLORIDE 10 MG: 10 TABLET ORAL at 19:51

## 2023-04-02 NOTE — PLAN OF CARE
Problem: MOBILITY - ADULT  Goal: Maintain or return to baseline ADL function  Description: INTERVENTIONS:  -  Assess patient's ability to carry out ADLs; assess patient's baseline for ADL function and identify physical deficits which impact ability to perform ADLs (bathing, care of mouth/teeth, toileting, grooming, dressing, etc )  - Assess/evaluate cause of self-care deficits   - Assess range of motion  - Assess patient's mobility; develop plan if impaired  - Assess patient's need for assistive devices and provide as appropriate  - Encourage maximum independence but intervene and supervise when necessary  - Involve family in performance of ADLs  - Assess for home care needs following discharge   - Consider OT consult to assist with ADL evaluation and planning for discharge  - Provide patient education as appropriate  Outcome: Progressing  Goal: Maintains/Returns to pre admission functional level  Description: INTERVENTIONS:  - Perform BMAT or MOVE assessment daily    - Set and communicate daily mobility goal to care team and patient/family/caregiver  - Collaborate with rehabilitation services on mobility goals if consulted  - Perform Range of Motion 3 times a day  - Reposition patient every 3 hours    - Dangle patient 3 times a day  - Stand patient 3 times a day  - Ambulate patient 3 times a day  - Out of bed to chair 3 times a day   - Out of bed for meals 3 times a day  - Out of bed for toileting  - Record patient progress and toleration of activity level   Outcome: Progressing    Problem: Prexisting or High Potential for Compromised Skin Integrity  Goal: Skin integrity is maintained or improved  Description: INTERVENTIONS:  - Identify patients at risk for skin breakdown  - Assess and monitor skin integrity  - Assess and monitor nutrition and hydration status  - Monitor labs   - Assess for incontinence   - Turn and reposition patient  - Assist with mobility/ambulation  - Relieve pressure over bony prominences  - Avoid friction and shearing  - Provide appropriate hygiene as needed including keeping skin clean and dry  - Evaluate need for skin moisturizer/barrier cream  - Collaborate with interdisciplinary team   - Patient/family teaching  - Consider wound care consult   Outcome: Progressing

## 2023-04-03 LAB
GLUCOSE SERPL-MCNC: 144 MG/DL (ref 65–140)
GLUCOSE SERPL-MCNC: 214 MG/DL (ref 65–140)
GLUCOSE SERPL-MCNC: 275 MG/DL (ref 65–140)
GLUCOSE SERPL-MCNC: 277 MG/DL (ref 65–140)

## 2023-04-03 NOTE — PHYSICAL THERAPY NOTE
Physical Therapy Progress Note     04/03/23 0930   PT Last Visit   PT Visit Date 04/03/23   Note Type   Note Type Treatment for insurance authorization   Pain Assessment   Pain Assessment Tool 0-10   Pain Score 9   Pain Location/Orientation Orientation: Left; Location: Leg   Hospital Pain Intervention(s) Repositioned   Restrictions/Precautions   LLE Weight Bearing Per Order NWB   Other Precautions Pain; Fall Risk   Subjective   Subjective The patient notes that she has severe pain in her leg, and that her elastic bandage has shifted  She is amenable to get out to the chair  The patient notes that her oxy does not do anything for her, and that the IV dilaudid only works for five minutes  Requested what she normally takes for pain at home that works for her, and she reports that she takes whatever they give her  Transfers   Sit pivot 4  Minimal assistance   Additional items Assist x 2;Verbal cues   Balance   Static Sitting Fair +   Dynamic Sitting Fair   Activity Tolerance   Activity Tolerance Patient limited by pain   Medical Staff Made Aware Partial co-treat with Dorcas Soria  OTR/L during transfer  Nurse Made Aware Yes  Exercises   THR Sitting;Bilateral;AROM;10 reps   Assessment   Prognosis Good   Problem List Decreased strength;Decreased endurance; Impaired balance;Decreased mobility; Decreased coordination;Decreased cognition; Impaired judgement;Decreased safety awareness;Pain;Orthopedic restrictions   Assessment The patient required less assistance during the pivot transfer today, but in the future would recommend use of a sliding board to make it easier to complete  She required to perform three small pivots, and then she had to roll in order to reposition the linens on the chair as they had shifted  She requires redirection to task during the session as she perseverates on watching shows on her phone to the extent that her phone had to be taken from her and placed on counter   Once this was done, she was readily able to perform the transfer  She then requested her phone again  Instructed the patient in therapeutic exercises and desensitization techniques  She will require reinforcement of this education in future sessions  She demonstrates overall weakness, and standing trials are not appropriate at this time  She will benefit from continued therapy in order to maximize her functional mobility and independence  Barriers to Discharge Inaccessible home environment;Decreased caregiver support   Goals   Patient Goals To watch her show, and to have less pain  STG Expiration Date 04/11/23   PT Treatment Day 1   Plan   Treatment/Interventions Functional transfer training;LE strengthening/ROM; Therapeutic exercise; Endurance training;Patient/family training;Bed mobility   Progress Progressing toward goals   PT Frequency 3-5x/wk   Recommendation   PT Discharge Recommendation Post acute rehabilitation services   Equipment Recommended Wheelchair   Wheelchair Package Recommended Heavy Duty (pt wt 250 lbs+)   Change or Add item to Wheelchair Package? Yes, Add Item; Yes, Change Item   What would you like to ADD? Transfer Board- 30 inch   What would you like to CHANGE? Wider Seat Width;Different Leg Rest Type (Elevating Leg Rests); Different Seat Cushion (Skin Protection)   W/C Seat Width 30 inch   AM-PAC Basic Mobility Inpatient   Turning in Flat Bed Without Bedrails 3   Lying on Back to Sitting on Edge of Flat Bed Without Bedrails 2   Moving Bed to Chair 2   Standing Up From Chair Using Arms 1   Walk in Room 1   Climb 3-5 Stairs With Railing 1   Basic Mobility Inpatient Raw Score 10   Turning Head Towards Sound 4   Follow Simple Instructions 4   Low Function Basic Mobility Raw Score 18   Low Function Basic Mobility Standardized Score 29 25   Highest Level Of Mobility   JH-HLM Goal 4: Move to chair/commode   JH-HLM Achieved 4: Move to chair/commode         An AM-PAC Basic Mobility raw score less than 16 suggests the patient may benefit from discharge to post-acute rehab services      Nataly Casas, RAIZA

## 2023-04-03 NOTE — PLAN OF CARE
Problem: MOBILITY - ADULT  Goal: Maintain or return to baseline ADL function  Description: INTERVENTIONS:  -  Assess patient's ability to carry out ADLs; assess patient's baseline for ADL function and identify physical deficits which impact ability to perform ADLs (bathing, care of mouth/teeth, toileting, grooming, dressing, etc )  - Assess/evaluate cause of self-care deficits   - Assess range of motion  - Assess patient's mobility; develop plan if impaired  - Assess patient's need for assistive devices and provide as appropriate  - Encourage maximum independence but intervene and supervise when necessary  - Involve family in performance of ADLs  - Assess for home care needs following discharge   - Consider OT consult to assist with ADL evaluation and planning for discharge  - Provide patient education as appropriate  Outcome: Progressing  Goal: Maintains/Returns to pre admission functional level  Description: INTERVENTIONS:  - Perform BMAT or MOVE assessment daily    - Set and communicate daily mobility goal to care team and patient/family/caregiver  - Collaborate with rehabilitation services on mobility goals if consulted  - Perform Range of Motion 2 times a day  - Reposition patient every 2 hours    - Dangle patient 2 times a day  - Stand patient 2 times a day  - Ambulate patient 2 times a day  - Out of bed to chair 2 times a day   - Out of bed for meals 2 times a day  - Out of bed for toileting  - Record patient progress and toleration of activity level   Outcome: Progressing     Problem: Prexisting or High Potential for Compromised Skin Integrity  Goal: Skin integrity is maintained or improved  Description: INTERVENTIONS:  - Identify patients at risk for skin breakdown  - Assess and monitor skin integrity  - Assess and monitor nutrition and hydration status  - Monitor labs   - Assess for incontinence   - Turn and reposition patient  - Assist with mobility/ambulation  - Relieve pressure over bony prominences  - Avoid friction and shearing  - Provide appropriate hygiene as needed including keeping skin clean and dry  - Evaluate need for skin moisturizer/barrier cream  - Collaborate with interdisciplinary team   - Patient/family teaching  - Consider wound care consult   Outcome: Progressing     Problem: Nutrition/Hydration-ADULT  Goal: Nutrient/Hydration intake appropriate for improving, restoring or maintaining nutritional needs  Description: Monitor and assess patient's nutrition/hydration status for malnutrition  Collaborate with interdisciplinary team and initiate plan and interventions as ordered  Monitor patient's weight and dietary intake as ordered or per policy  Utilize nutrition screening tool and intervene as necessary  Determine patient's food preferences and provide high-protein, high-caloric foods as appropriate       INTERVENTIONS:  - Monitor oral intake, urinary output, labs, and treatment plans  - Assess nutrition and hydration status and recommend course of action  - Evaluate amount of meals eaten  - Assist patient with eating if necessary   - Allow adequate time for meals  - Recommend/ encourage appropriate diets, oral nutritional supplements, and vitamin/mineral supplements  - Order, calculate, and assess calorie counts as needed  - Recommend, monitor, and adjust tube feedings and TPN/PPN based on assessed needs  - Assess need for intravenous fluids  - Provide specific nutrition/hydration education as appropriate  - Include patient/family/caregiver in decisions related to nutrition  Outcome: Progressing     Problem: SAFETY,RESTRAINT: NV/NON-SELF DESTRUCTIVE BEHAVIOR  Goal: Remains free of harm/injury (restraint for non violent/non self-detsructive behavior)  Description: INTERVENTIONS:  - Instruct patient/family regarding restraint use   - Assess and monitor physiologic and psychological status   - Provide interventions and comfort measures to meet assessed patient needs   - Identify and implement measures to help patient regain control  - Assess readiness for release of restraint   Outcome: Progressing  Goal: Returns to optimal restraint-free functioning  Description: INTERVENTIONS:  - Assess the patient's behavior and symptoms that indicate continued need for restraint  - Identify and implement measures to help patient regain control  - Assess readiness for release of restraint   Outcome: Progressing

## 2023-04-03 NOTE — PLAN OF CARE
Problem: PHYSICAL THERAPY ADULT  Goal: Performs mobility at highest level of function for planned discharge setting  See evaluation for individualized goals  Description: Treatment/Interventions: OT, Spoke to case management, Spoke to nursing, Gait training, Bed mobility, Patient/family training, Endurance training, Functional transfer training, LE strengthening/ROM  Equipment Recommended: Wheelchair, Jorge Herbert       See flowsheet documentation for full assessment, interventions and recommendations  Outcome: Progressing  Note: Prognosis: Good  Problem List: Decreased strength, Decreased endurance, Impaired balance, Decreased mobility, Decreased coordination, Decreased cognition, Impaired judgement, Decreased safety awareness, Pain, Orthopedic restrictions  Assessment: The patient required less assistance during the pivot transfer today, but in the future would recommend use of a sliding board to make it easier to complete  She required to perform three small pivots, and then she had to roll in order to reposition the linens on the chair as they had shifted  She requires redirection to task during the session as she perseverates on watching shows on her phone to the extent that her phone had to be taken from her and placed on counter  Once this was done, she was readily able to perform the transfer  She then requested her phone again  Instructed the patient in therapeutic exercises and desensitization techniques  She will require reinforcement of this education in future sessions  She demonstrates overall weakness, and standing trials are not appropriate at this time  She will benefit from continued therapy in order to maximize her functional mobility and independence  Barriers to Discharge: Inaccessible home environment, Decreased caregiver support     PT Discharge Recommendation: Post acute rehabilitation services    See flowsheet documentation for full assessment

## 2023-04-03 NOTE — ASSESSMENT & PLAN NOTE
Lab Results   Component Value Date    HGBA1C 9 8 (A) 12/20/2022       Recent Labs     04/02/23  2131 04/03/23  0719 04/03/23  1137 04/03/23  1610   POCGLU 239* 214* 144* 277*       Blood Sugar Average: Last 72 hrs:  (P) 197 2     Diabetes mellitus type 2 uncontrolled  Continue Lantus at bedtime, Humalog 3 times daily with meals  Titrate insulin dose  Endocrinology inputs noted  Monitor Accu-Cheks  Avoid hypoglycemia  Hypoglycemia protocol in place

## 2023-04-03 NOTE — OCCUPATIONAL THERAPY NOTE
Occupational Therapy Progress Note     Patient Name: Avril Jacques  PZRWV'D Date: 4/3/2023  Problem List  Principal Problem:    Acute respiratory distress  Active Problems:    Essential hypertension    Type 2 diabetes mellitus with diabetic polyneuropathy, with long-term current use of insulin ([facility])    Nicotine dependence    Bipolar disorder ([facility])    PAD (peripheral artery disease) ([facility])    BMI 45 0-49 9, adult ([facility])    S/P BKA (below knee amputation) unilateral, left ([facility])    Atherosclerosis of left leg ([facility] [address] )          04/03/23 0914   OT Last Visit   OT Visit Date 04/03/23   Note Type   Note Type Treatment for insurance authorization   Pain Assessment   Pain Assessment Tool 0-10   Pain Score 9   Pain Location/Orientation Orientation: Left; Location: Leg   Hospital Pain Intervention(s) Repositioned; Ambulation/increased activity   Restrictions/Precautions   Weight Bearing Precautions Per Order Yes   LLE Weight Bearing Per Order NWB  (s/p AKA)   Other Precautions Chair Alarm; Bed Alarm; Fall Risk;Pain   Lifestyle   Autonomy I with ADLS, shares IALD with wife Does not drive   Reciprocal Relationships supportive wife   Service to Others not working   Intrinsic Gratification Shopping   ADL   Where Assessed Edge of bed   LB Dressing Assistance 3  Moderate Assistance   LB Dressing Deficit Don/doff R sock   Toileting Assistance  3  Moderate Assistance   Toileting Deficit Perineal hygiene   Bed Mobility   Rolling R 4  Minimal assistance   Additional items Assist x 1; Increased time required; Bedrails  (S in bed, MIN A in chair)   Supine to Sit 5  Supervision   Additional items Assist x 1;Bedrails;HOB elevated; Increased time required   Sit to Supine Unable to assess   Transfers   Sit to Stand Unable to assess   Stand to Sit Unable to assess   Sit pivot 4  Minimal assistance   Additional items Increased time required;Assist x 2  (anterior and posterior support)   Additional Comments transfers from bed to drop arm chair Cognition   Overall Cognitive Status WFL   Arousal/Participation Responsive; Cooperative   Attention Attends with cues to redirect   Orientation Level Oriented X4   Memory Within functional limits   Following Commands Follows one step commands without difficulty   Comments Pt cooperative t/o session   Activity Tolerance   Activity Tolerance Patient limited by pain   Medical Staff Made Aware PTA JEN Aguilera    Assessment   Assessment Patient participated in Skilled OT session this date with interventions consisting of ADL re training with the use of correct body mechnaics, Energy Conservation techniques, safety awareness and fall prevention techniques,  therapeutic activities to: increase activity tolerance, increase dynamic sit/ stand balance during functional activity  and increase OOB/ sitting tolerance   Upon arrival patient was found supine in bed  Pt demonstrated the following tasks: S sup to sit, S to roll in bed/MIN in chair  Pt performed LBD and toileting with MOD A  Deferred changing top at this time  Pt transferred from EOB to drop arm chair with MIN A x 2  Patient continues to be functioning below baseline level, occupational performance remains limited secondary to factors listed above and increased risk for falls and injury  From OT standpoint, recommendation at time of d/c would be STR  Patient to benefit from continued Occupational Therapy treatment while in the hospital to address deficits as defined above and maximize level of functional independence with ADLs and functional mobility  Pt was left after session with all current needs met  The patient's raw score on the AM-PAC Daily Activity Inpatient Short Form is 17  A raw score of less than 19 suggests the patient may benefit from discharge to post-acute rehabilitation services  Please refer to the recommendation of the Occupational Therapist for safe discharge planning     Plan   Treatment Interventions ADL retraining;Functional transfer training;UE strengthening/ROM; Endurance training;Patient/family training;Equipment evaluation/education; Compensatory technique education;Continued evaluation; Energy conservation; Activityengagement   Goal Expiration Date 04/13/23   OT Treatment Day 1   OT Frequency 3-5x/wk   Recommendation   OT Discharge Recommendation Post acute rehabilitation services   AM-PAC Daily Activity Inpatient   Lower Body Dressing 2   Bathing 2   Toileting 2   Upper Body Dressing 3   Grooming 4   Eating 4   Daily Activity Raw Score 17   Daily Activity Standardized Score (Calc for Raw Score >=11) 37 26   AM-PAC Applied Cognition Inpatient   Following a Speech/Presentation 4   Understanding Ordinary Conversation 4   Taking Medications 4   Remembering Where Things Are Placed or Put Away 4   Remembering List of 4-5 Errands 4   Taking Care of Complicated Tasks 4   Applied Cognition Raw Score 24   Applied Cognition Standardized Score 62 21       Thai Du MS, OTR/L

## 2023-04-03 NOTE — ARC ADMISSION
Reviewed updates with [facility] physician - patient is recommended for SNF/subacute rehab for longer therapy needs, as well as unsafe disposition plan  CM has been updated  Please notify with any changes

## 2023-04-03 NOTE — PROGRESS NOTES
[facility]  Progress Note  Name: Rylee Harris I  MRN: [id number]  Unit/Bed#: [facility] 904-01 I Date of Admission: 3/24/2023   Date of Service: 4/3/2023 I Hospital Day: 10    Assessment/Plan   * Acute respiratory distress  Assessment & Plan  Acute respiratory distress present on admission  Postoperatively patient was noted to have bronchospasm requiring reintubation admission to the ICU  She has completed steroid treatments  Extubated  Respiratory distress resolved  Encourage incentive spirometry    S/P BKA (below knee amputation) unilateral, [facility] ([facility] )  Assessment & Plan  S/p left BKA with vascular surgery  Vascular surgery inputs noted    PAD (peripheral artery disease) ([place] [address] )  Assessment & Plan  Continue aspirin statin  Xarelto  Vascular surgery inputs noted    BMI 45 0-49 9, adult [facility])  Assessment & Plan  Therapeutic lifestyle modification encouraged  Outpatient sleep study recommended    Bipolar disorder ([place] [address] )  Assessment & Plan  Continue Seroquel, topiramate  Psychiatry inputs noted    Nicotine dependence  Assessment & Plan  Smoking cessation discussed and encouraged    Type 2 diabetes mellitus with diabetic polyneuropathy, with long-term current use of insulin [facility])  Assessment & Plan  Lab Results   Component Value Date    HGBA1C 9 8 (A) 12/20/2022       Recent Labs     04/02/23  2131 04/03/23  0719 04/03/23  1137 04/03/23  1610   POCGLU 239* 214* 144* 277*       Blood Sugar Average: Last 72 hrs:  (P) 197 2     Diabetes mellitus type 2 uncontrolled  Continue Lantus at bedtime, Humalog 3 times daily with meals  Titrate insulin dose  Endocrinology inputs noted  Monitor Accu-Cheks  Avoid hypoglycemia  Hypoglycemia protocol in place      Essential hypertension  Assessment & Plan  Monitor blood pressures  Avoid hypotension                 VTE Pharmacologic Prophylaxis: VTE Score: 5 High Risk (Score >/= 5) - Pharmacological DVT Prophylaxis Ordered: rivaroxaban (Xarelto)  "Sequential Compression Devices Ordered  Patient Centered Rounds: I performed bedside rounds with nursing staff today  Discussions with Specialists or Other Care Team Provider: Psychiatry    Education and Discussions with Family / Patient: Discussed with the patient, reports she is keeping her family updated  Total Time Spent on Date of Encounter in care of patient: 35 minutes This time was spent on one or more of the following: performing physical exam; counseling and coordination of care; obtaining or reviewing history; documenting in the medical record; reviewing/ordering tests, medications or procedures; communicating with other healthcare professionals and discussing with patient's family/caregivers  Current Length of Stay: 10 day(s)  Current Patient Status: Inpatient   Certification Statement: The patient will continue to require additional inpatient hospital stay due to As outlined  Discharge Plan: Pending placement Case management following    Code Status: Level 1 - Full Code    Subjective:     Comfortably in bed  Reports feeling pain in her left lower extremity stump  Reports \" its the phantom pain\"  History chart labs medications reviewed  Encourage out of bed into chair  Encourage incentive spirometry    Objective:     Vitals:   Temp (24hrs), Av 3 °F (36 3 °C), Min:97 °F (36 1 °C), Max:97 7 °F (36 5 °C)    Temp:  [97 °F (36 1 °C)-97 7 °F (36 5 °C)] 97 °F (36 1 °C)  HR:  [] 107  Resp:  [18] 18  BP: (128-147)/(55-85) 137/64  SpO2:  [94 %-99 %] 98 %  Body mass index is 51 07 kg/m²  Input and Output Summary (last 24 hours):      Intake/Output Summary (Last 24 hours) at 4/3/2023 1647  Last data filed at 4/3/2023 1319  Gross per 24 hour   Intake 480 ml   Output --   Net 480 ml       Physical Exam:   Physical Exam     Comfortably in bed  Morbidly obese  Short thick neck  Lungs diminished breath sounds bilateral  Heart sounds S1-S2 noted  Heart sounds are distant  Abdomen soft " nontender  Abdominal obesity noted  Awake obey simple commands  Left BKA noted  No rash    Additional Data:     Labs:  Results from last 7 days   Lab Units 03/31/23  0544 03/29/23  0511 03/28/23  0556   WBC Thousand/uL 20 32*   < > 13 99*   HEMOGLOBIN g/dL 9 3*   < > 10 7*   HEMATOCRIT % 27 6*   < > 30 3*   PLATELETS Thousands/uL 303   < > 240   NEUTROS PCT %  --   --  62   LYMPHS PCT %  --   --  30   MONOS PCT %  --   --  6   EOS PCT %  --   --  1    < > = values in this interval not displayed  Results from last 7 days   Lab Units 03/30/23  0612   SODIUM mmol/L 136   POTASSIUM mmol/L 3 7   CHLORIDE mmol/L 103   CO2 mmol/L 28   BUN mg/dL 16   CREATININE mg/dL 0 44*   ANION GAP mmol/L 5   CALCIUM mg/dL 8 1*   ALBUMIN g/dL 2 0*   TOTAL BILIRUBIN mg/dL 0 32   ALK PHOS U/L 83   ALT U/L 69   AST U/L 58*   GLUCOSE RANDOM mg/dL 196*         Results from last 7 days   Lab Units 04/03/23  1610 04/03/23  1137 04/03/23  0719 04/02/23  2131 04/02/23  1624 04/02/23  1115 04/02/23  0710 04/01/23  2141 04/01/23  1619 04/01/23  1109 04/01/23  0720 03/31/23  2043   POC GLUCOSE mg/dl 277* 144* 214* 239* 241* 113 187* 174* 175* 118 163* 305*               Lines/Drains:  Invasive Devices     Peripherally Inserted Central Catheter Line  Duration           PICC Line 69/04/06 Right Basilic 9 days                Central Line:  Goal for removal: Will discontinue when peripheral access obtained                Imaging: Reviewed radiology reports from this admission including: chest xray and procedure reports    Recent Cultures (last 7 days):         Last 24 Hours Medication List:   Current Facility-Administered Medications   Medication Dose Route Frequency Provider Last Rate   • acetaminophen  975 mg Oral Q8H [address] Rodrigo Riedel, MD     • albuterol  2 puff Inhalation Q4H PRN Rodrigo Riedel, MD     • albuterol  2 5 mg Nebulization Q4H PRN Annette Caldwell,      • aspirin  81 mg Oral Daily Rodrigo Riedel, MD     • atorvastatin  40 mg Oral Daily With Abimael Ledezma MD     • bisacodyl  10 mg Rectal Daily PRN Elizabeth Gomez MD     • chlorhexidine  15 mL Mouth/Throat Q12H [address] Elizabeth Gomez MD     • fluticasone-vilanterol  1 puff Inhalation Daily Elizabeth Gomez MD     • gabapentin  300 mg Oral BID Elizabeth Gomez MD     • hydrALAZINE  10 mg Intravenous Q6H PRN Elizabeth Gomez MD     • hydrALAZINE  25 mg Oral Q8H [address] Elizabeth Gomez MD     • HYDROmorphone  1 mg Intravenous Q4H PRN Elizabeth Gomez MD     • insulin glargine  40 Units Subcutaneous HS Elizabeth Gomez MD     • [START ON 4/4/2023] insulin lispro  14 Units Subcutaneous TID With Meals Elizabeth Gomez MD     • insulin lispro  2-12 Units Subcutaneous TID AC Elizabeth Gomez MD     • insulin lispro  2-12 Units Subcutaneous HS Elizabeth Gomez MD     • melatonin  6 mg Oral HS Elizabeth Gomez MD     • methocarbamol  500 mg Oral Q6H [address] Elizabeth Gomez MD     • naloxone  0 04 mg Intravenous Q1MIN PRN Elizabeth Gomez MD     • nicotine  21 mg Transdermal Daily Elizabeth Gomez MD     • oxyCODONE  10 mg Oral Q4H PRN Elizabeth Gomez MD     • oxyCODONE  5 mg Oral Q4H PRN Elizabeth Gomez MD     • polyethylene glycol  17 g Oral Daily Elizabeth Gomez MD     • prazosin  1 mg Oral HS Elizabeth Gomez MD     • QUEtiapine  200 mg Oral HS Elizabeth Gomez MD     • rivaroxaban  15 mg Oral BID With Meals Glendy Spencer PA-C     • senna-docusate sodium  2 tablet Oral BID Elizabeth Gomez MD     • simethicone  80 mg Oral Q6H PRN Elizabeth Gomez MD     • topiramate  25 mg Oral BID Elizaebth Gomez MD          Today, Patient Was Seen By: Elizabeth Gomez MD    **Please Note: This note may have been constructed using a voice recognition system  **

## 2023-04-03 NOTE — ASSESSMENT & PLAN NOTE
Acute respiratory distress present on admission  Postoperatively patient was noted to have bronchospasm requiring reintubation admission to the ICU  She has completed steroid treatments  Extubated  Respiratory distress resolved  Encourage incentive spirometry

## 2023-04-03 NOTE — PLAN OF CARE
Problem: OCCUPATIONAL THERAPY ADULT  Goal: Performs self-care activities at highest level of function for planned discharge setting  See evaluation for individualized goals  Description: Treatment Interventions: ADL retraining, Functional transfer training, UE strengthening/ROM, Endurance training, Patient/family training, Compensatory technique education, Continued evaluation, Energy conservation, Activityengagement          See flowsheet documentation for full assessment, interventions and recommendations  Outcome: Progressing  Note: Limitation: Decreased ADL status, Decreased UE strength, Decreased Safe judgement during ADL, Decreased endurance, Decreased self-care trans, Decreased high-level ADLs  Prognosis: Good  Assessment: Patient participated in Skilled OT session this date with interventions consisting of ADL re training with the use of correct body mechnaics, Energy Conservation techniques, safety awareness and fall prevention techniques,  therapeutic activities to: increase activity tolerance, increase dynamic sit/ stand balance during functional activity  and increase OOB/ sitting tolerance   Upon arrival patient was found supine in bed  Pt demonstrated the following tasks: S sup to sit, S to roll in bed/MIN in chair  Pt performed LBD and toileting with MOD A  Deferred changing top at this time  Pt transferred from EOB to drop arm chair with MIN A x 2  Patient continues to be functioning below baseline level, occupational performance remains limited secondary to factors listed above and increased risk for falls and injury  From OT standpoint, recommendation at time of d/c would be STR  Patient to benefit from continued Occupational Therapy treatment while in the hospital to address deficits as defined above and maximize level of functional independence with ADLs and functional mobility  Pt was left after session with all current needs met   The patient's raw score on the AM-PAC Daily Activity Inpatient Short Form is 17  A raw score of less than 19 suggests the patient may benefit from discharge to post-acute rehabilitation services  Please refer to the recommendation of the Occupational Therapist for safe discharge planning       OT Discharge Recommendation: Post acute rehabilitation services
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
## 2023-04-03 NOTE — UTILIZATION REVIEW
Continued Stay Review    Date:  4/3/2023                       Current Patient Class: inpatient  Current Level of Care: med/sug  HPI:37 y o  female initially admitted on 3/24    Assessment/Plan: PT/OT evals with recommendation for IP rehab on d/c  Psych consulted -- recommended continue med management  Continue current psychotropic meds  Cleared for d/c to SNF/subacute rehab  CM working on finding available bed  Per CM note acute rehab facilities did not accept pt  PT will need to be optioned  [place] referrals have been placed  Continue supportive care  Accuchecks w/ ssi  Po meds  Reg diet  SCDs      Vital Signs:   Date/Time Temp Pulse Resp BP MAP (mmHg) SpO2 O2 Device Patient Position - Orthostatic VS   04/03/23 1500 97 °F (36 1 °C) Abnormal  107 Abnormal  -- 137/64 88 98 % -- --   04/03/23 0830 -- -- -- -- -- -- None (Room air) --   04/03/23 0700 97 3 °F (36 3 °C) Abnormal  98 18 128/55 -- 94 % -- --   04/03/23 0537 -- 99 -- 143/83 -- 98 % None (Room air) Lying   04/02/23 2135 97 7 °F (36 5 °C) 98 18 147/85 -- 99 % None (Room air) Lying   04/02/23 2100 -- -- -- -- -- -- None (Room air) --   04/02/23 1523 -- 98 18 -- -- -- -- --   04/02/23 1500 97 7 °F (36 5 °C) -- -- 149/83 -- -- -- --   04/02/23 05:43:25 97 5 °F (36 4 °C) 104 -- 152/83 106 100 % -- --   04/02/23 0539 -- -- -- 152/83 -- -- -- --   04/02/23 0032 -- -- -- -- -- -- None (Room air) --   04/01/23 22:37:08 97 3 °F (36 3 °C) Abnormal  97 14 147/86 106 96 % -- --   04/01/23 20:32:58 97 3 °F (36 3 °C) Abnormal  102 -- 149/86 107 96 % -- --   04/01/23 15:28:46 97 °F (36 1 °C) Abnormal  108 Abnormal  20 137/97 110 92 % -- --   04/01/23 14:31:31 -- 104 -- 137/99 112 96 % -- --   04/01/23 1430 -- -- -- 137/99 -- -- -- --   04/01/23 07:23:40 97 3 °F (36 3 °C) Abnormal  101 17 107/63 78 92 % -- --   04/01/23 05:51:36 -- 99 -- 146/85 105 99 % -- --   04/01/23 0045 -- -- -- -- -- --         Pertinent Labs/Diagnostic Results:     Results from last 7 days   Lab Units 03/31/23  0544 03/30/23  0612 03/29/23  2209 03/29/23  0511 03/28/23  0556   WBC Thousand/uL 20 32* 18 34* 16 04* 15 80* 13 99*   HEMOGLOBIN g/dL 9 3* 9 3* 9 6* 11 0* 10 7*   HEMATOCRIT % 27 6* 26 4* 27 5* 31 2* 30 3*   PLATELETS Thousands/uL 303 276 270 250 240   NEUTROS ABS Thousands/µL  --   --   --   --  8 59*     Results from last 7 days   Lab Units 03/30/23  0612 03/29/23  0511 03/28/23  0556   SODIUM mmol/L 136 138 140   POTASSIUM mmol/L 3 7 3 7 3 3*   CHLORIDE mmol/L 103 105 107   CO2 mmol/L 28 29 30   ANION GAP mmol/L 5 4 3*   BUN mg/dL 16 18 17   CREATININE mg/dL 0 44* 0 42* 0 38*   EGFR ml/min/1 73sq m 130 132 136   CALCIUM mg/dL 8 1* 8 8 7 6*   MAGNESIUM mg/dL  --   --  2 3   PHOSPHORUS mg/dL  --   --  4 0     Results from last 7 days   Lab Units 03/30/23  0612 03/28/23  0556   AST U/L 58* 134*   ALT U/L 69 55   ALK PHOS U/L 83 80   TOTAL PROTEIN g/dL 5 7* 5 6*   ALBUMIN g/dL 2 0* 2 0*   TOTAL BILIRUBIN mg/dL 0 32 0 42     Results from last 7 days   Lab Units 04/03/23  1610 04/03/23  1137 04/03/23  0719 04/02/23  2131 04/02/23  1624 04/02/23  1115 04/02/23  0710 04/01/23  2141 04/01/23  1619 04/01/23  1109 04/01/23  0720 03/31/23  2043   POC GLUCOSE mg/dl 277* 144* 214* 239* 241* 113 187* 174* 175* 118 163* 305*     Results from last 7 days   Lab Units 03/30/23  0612 03/29/23  0511 03/28/23  0556   GLUCOSE RANDOM mg/dL 196* 120 87     Results from last 7 days   Lab Units 03/31/23  0358 03/30/23 2005 03/30/23  1254   PTT seconds 51* 53* 49*       Medications:   Scheduled Medications:  acetaminophen, 975 mg, Oral, Q8H ABDULKADIR  aspirin, 81 mg, Oral, Daily  atorvastatin, 40 mg, Oral, Daily With Dinner  chlorhexidine, 15 mL, Mouth/Throat, Q12H [address]  fluticasone-vilanterol, 1 puff, Inhalation, Daily  gabapentin, 300 mg, Oral, BID  hydrALAZINE, 25 mg, Oral, Q8H ABDULKADIR  insulin glargine, 35 Units, Subcutaneous, HS  insulin lispro, 10 Units, Subcutaneous, TID With Meals  insulin lispro, 2-12 Units, Subcutaneous, TID AC  insulin lispro, 2-12 Units, Subcutaneous, HS  melatonin, 6 mg, Oral, HS  methocarbamol, 500 mg, Oral, Q6H ABDULKADIR  nicotine, 21 mg, Transdermal, Daily  polyethylene glycol, 17 g, Oral, Daily  prazosin, 1 mg, Oral, HS  QUEtiapine, 200 mg, Oral, HS  rivaroxaban, 15 mg, Oral, BID With Meals  senna-docusate sodium, 2 tablet, Oral, BID  topiramate, 25 mg, Oral, BID    PRN Meds:  albuterol, 2 puff, Inhalation, Q4H PRN  albuterol, 2 5 mg, Nebulization, Q4H PRN  bisacodyl, 10 mg, Rectal, Daily PRN  hydrALAZINE, 10 mg, Intravenous, Q6H PRN  HYDROmorphone, 1 mg, Intravenous, Q4H PRN  4/1 x4, 4/2 x5, 4/3 x3  naloxone, 0 04 mg, Intravenous, Q1MIN PRN  oxyCODONE, 10 mg, Oral, Q4H PRN 4/1 x5, 4/2 x4, 4/3 x3  oxyCODONE, 5 mg, Oral, Q4H PRN  simethicone, 80 mg, Oral, Q6H PRN        Discharge Plan: D    Network Utilization Review Department  ATTENTION: Please call with any questions or concerns to [phone] and carefully listen to the prompts so that you are directed to the right person  All voicemails are confidential   Geanie Windsor all requests for admission clinical reviews, approved or denied determinations and any other requests to dedicated fax number below belonging to the campus where the patient is receiving treatment   List of dedicated fax numbers for the Facilities:  [address] [place] [address] DENIALS (Administrative/Medical Necessity) [phone]   [address] (Maternity/NICU/Pediatrics) [phone]   [address] [phone]   [facility] [phone]   [place] [phone]   [id number] [place] [address] [address]   [address] [place] [phone]   [facility] [address]  [address] [facility] [phone]

## 2023-04-04 DIAGNOSIS — Z79.4 TYPE 2 DIABETES MELLITUS WITH DIABETIC POLYNEUROPATHY, WITH LONG-TERM CURRENT USE OF INSULIN (HCC): ICD-10-CM

## 2023-04-04 DIAGNOSIS — E11.42 TYPE 2 DIABETES MELLITUS WITH DIABETIC POLYNEUROPATHY, WITH LONG-TERM CURRENT USE OF INSULIN (HCC): ICD-10-CM

## 2023-04-04 DIAGNOSIS — I10 HYPERTENSION: ICD-10-CM

## 2023-04-04 DIAGNOSIS — E11.9 DIABETES (HCC): ICD-10-CM

## 2023-04-04 LAB
GLUCOSE SERPL-MCNC: 173 MG/DL (ref 65–140)
GLUCOSE SERPL-MCNC: 226 MG/DL (ref 65–140)
GLUCOSE SERPL-MCNC: 230 MG/DL (ref 65–140)
GLUCOSE SERPL-MCNC: 240 MG/DL (ref 65–140)

## 2023-04-04 RX ORDER — LISINOPRIL 20 MG/1
20 TABLET ORAL DAILY
Qty: 30 TABLET | Refills: 0 | Status: SHIPPED | OUTPATIENT
Start: 2023-04-04

## 2023-04-04 RX ORDER — INSULIN LISPRO 100 [IU]/ML
INJECTION, SOLUTION INTRAVENOUS; SUBCUTANEOUS
Qty: 10 ML | Refills: 0 | Status: SHIPPED | OUTPATIENT
Start: 2023-04-04

## 2023-04-04 RX ORDER — DULAGLUTIDE 1.5 MG/.5ML
INJECTION, SOLUTION SUBCUTANEOUS
Qty: 2 ML | Refills: 2 | Status: SHIPPED | OUTPATIENT
Start: 2023-04-04

## 2023-04-04 NOTE — PROGRESS NOTES
"Progress Note - Shakeel Molina 40 y o  female MRN: [id number]    Unit/Bed#: [facility] 904-01 Encounter: [id number]      CC: diabetes f/u    Subjective:   Shakeel Molina is a 40y o  year old female with type 2 DM s/p left BKA  She continues to report pain in LLE and beyond LLE  She is off steroids with last dose Sunday  She reports eating half of her lunch and half of her dinner yesterday but does not know if this is more or less than she has been eating while hospitalized  She did not receive humalog with lunch or dinner and had subsequent hyperglycemia  Objective:     Vitals: Blood pressure 124/68, pulse 95, temperature (!) 97 3 °F (36 3 °C), resp  rate 20, height 5' 1\" (1 549 m), weight 125 kg (275 lb 9 2 oz), SpO2 98 %, not currently breastfeeding  ,Body mass index is 52 07 kg/m²  Intake/Output Summary (Last 24 hours) at 4/4/2023 1540  Last data filed at 4/4/2023 1246  Gross per 24 hour   Intake 720 ml   Output 100 ml   Net 620 ml       Physical Exam:  General Appearance: awake, appears stated age and cooperative  Head: Normocephalic, without obvious abnormality, atraumatic  Extremities: moves all extremities  Skin: Skin color and temperature normal    Pulm: no labored breathing    Lab, Imaging and other studies: I have personally reviewed pertinent reports  POC Glucose (mg/dl)   Date Value   04/04/2023 230 (H)   04/04/2023 173 (H)   04/03/2023 275 (H)   04/03/2023 277 (H)   04/03/2023 144 (H)   04/03/2023 214 (H)   04/02/2023 239 (H)   04/02/2023 241 (H)   04/02/2023 113   04/02/2023 187 (H)       Assessment and plan:  Plan:  Type 2 diabetes with steroid-induced hyperglycemia  Most recent A1c 9 8  Inpatient regimen: 40 units lantus daily at bedtime, 14 units humalog with meals, and correctional coverage  Adjustments were made by primary team yesterday, will continue to monitor with these changes  Portions of the record may have been created with voice recognition software     "

## 2023-04-04 NOTE — ASSESSMENT & PLAN NOTE
Hx of occluded bypass and profunda thrombus  Continue aspirin/statin/xarelto  Vascular surgery inputs noted  S/p hematology consult complete thrombophilia evaluation as outpt   Deemed no need to proceed as being already on a/c likely to skew results

## 2023-04-04 NOTE — PROGRESS NOTES
[facility]  Progress Note  Name: Jacinda Vazquez  MRN: [id number]  [facility] Date of Admission: 3/[facility]/2023   Date of Service: 4/4/2023 I Hospital Day: 11    Assessment/Plan   * S/P BKA (below knee amputation) unilateral, left ([facility] )  Assessment & Plan  S/p left BKA with vascular surgery  Vascular surgery inputs noted  APS on board for pain management    PAD (peripheral artery disease) ([place] [id number] )  Assessment & Plan  Hx of occluded bypass and profunda thrombus  Continue aspirin/statin/xarelto  Vascular surgery inputs noted  S/p hematology consult complete thrombophilia evaluation as outpt  Deemed no need to proceed as being already on a/c likely to skew results    Acute respiratory distress  Assessment & Plan  Acute respiratory distress present on admission  Postoperatively patient was noted to have bronchospasm requiring reintubation admission to the ICU  Hx of asthma  S/p completion of steroid taper  Extubated successfully  Respiratory distress resolved; on RA  Encourage incentive spirometry  Cont pulm inhalers    BMI 45 0-49 9, adult [facility])  Assessment & Plan  Therapeutic lifestyle modification encouraged  Outpatient sleep study recommended    Bipolar disorder ([place] [address] )  Assessment & Plan  Continue Seroquel, topiramate  S/p psych consult, cleared for snf discharge    Nicotine dependence  Assessment & Plan  Smoking cessation discussed and encouraged    Type 2 diabetes mellitus with diabetic polyneuropathy, with long-term current use of insulin [facility])  Assessment & Plan  Lab Results   Component Value Date    HGBA1C 9 8 (A) 12/20/2022       Recent Labs     04/03/23  1610 04/03/23  2107 04/04/23  0738 04/04/23  1139   POCGLU 277* 275* 173* 230*       Blood Sugar Average: Last 72 hrs:  (P) 194 5     Diabetes mellitus type 2 uncontrolled  Endocrinology following   Cont insulin regimen as per their recommendations  Monitor Accu-Cheks  Avoid hypoglycemia  Hypoglycemia protocol "in place      Essential hypertension  Assessment & Plan  Stable       VTE Pharmacologic Prophylaxis:   Pharmacologic: Rivaroxaban (Xarelto)  Mechanical VTE Prophylaxis in Place: No    Patient Centered Rounds: I have performed bedside rounds with nursing staff today  Discussions with Specialists or Other Care Team Provider:     Education and Discussions with Family / Patient: Patient    Time Spent for Care: 30 minutes  More than 50% of total time spent on counseling and coordination of care as described above  Current Length of Stay: 11 day(s)    Current Patient Status: Inpatient   Certification Statement: The patient will continue to require additional inpatient hospital stay due to pending placement    Discharge Plan: SNF    Code Status: Level 1 - Full Code      Subjective: In bed  On phone  C/o \"phantom pain in amputated limb\"  No acute events overnight    Objective:     Vitals:   Temp (24hrs), Av 2 °F (36 2 °C), Min:97 °F (36 1 °C), Max:97 3 °F (36 3 °C)    Temp:  [97 °F (36 1 °C)-97 3 °F (36 3 °C)] 97 3 °F (36 3 °C)  HR:  [] 95  Resp:  [18-20] 20  BP: (124-153)/(64-78) 124/68  SpO2:  [98 %-100 %] 98 %  Body mass index is 52 07 kg/m²  Input and Output Summary (last 24 hours): Intake/Output Summary (Last 24 hours) at 2023 1233  Last data filed at 2023 0900  Gross per 24 hour   Intake 720 ml   Output 100 ml   Net 620 ml       Physical Exam:     Physical Exam  Constitutional:       Appearance: She is obese  Cardiovascular:      Rate and Rhythm: Normal rate and regular rhythm  Pulses: Normal pulses  Heart sounds: Normal heart sounds  Pulmonary:      Effort: Pulmonary effort is normal  No respiratory distress  Breath sounds: Normal breath sounds  No wheezing or rales  Abdominal:      General: Bowel sounds are normal  There is no distension  Palpations: Abdomen is soft  Tenderness: There is no abdominal tenderness  There is no guarding   " Musculoskeletal:      Cervical back: Normal range of motion and neck supple  Right lower leg: No edema  Left lower leg: No edema  Comments: L BKA   Skin:     General: Skin is warm and dry  Neurological:      General: No focal deficit present  Mental Status: She is alert and oriented to person, place, and time  Mental status is at baseline  Cranial Nerves: No cranial nerve deficit  Motor: No weakness  Additional Data:     Labs:    Results from last 7 days   Lab Units 03/31/23  0544   WBC Thousand/uL 20 32*   HEMOGLOBIN g/dL 9 3*   HEMATOCRIT % 27 6*   PLATELETS Thousands/uL 303     Results from last 7 days   Lab Units 03/30/23  0612   SODIUM mmol/L 136   POTASSIUM mmol/L 3 7   CHLORIDE mmol/L 103   CO2 mmol/L 28   BUN mg/dL 16   CREATININE mg/dL 0 44*   ANION GAP mmol/L 5   CALCIUM mg/dL 8 1*   ALBUMIN g/dL 2 0*   TOTAL BILIRUBIN mg/dL 0 32   ALK PHOS U/L 83   ALT U/L 69   AST U/L 58*   GLUCOSE RANDOM mg/dL 196*         Results from last 7 days   Lab Units 04/04/23  1139 04/04/23  0738 04/03/23  2107 04/03/23  1610 04/03/23  1137 04/03/23  0719 04/02/23  2131 04/02/23  1624 04/02/23  1115 04/02/23  0710 04/01/23  2141 04/01/23  1619   POC GLUCOSE mg/dl 230* 173* 275* 277* 144* 214* 239* 241* 113 187* 174* 175*                   * I Have Reviewed All Lab Data Listed Above    * Additional Pertinent Lab Tests Reviewed: No New Labs Available For Today    Imaging:    Imaging Reports Reviewed Today Include:   Imaging Personally Reviewed by Myself Includes:      Recent Cultures (last 7 days):           Last 24 Hours Medication List:   Current Facility-Administered Medications   Medication Dose Route Frequency Provider Last Rate   • acetaminophen  975 mg Oral Q8H [address] Meredith Gaona MD     • albuterol  2 puff Inhalation Q4H PRN Meredith Gaona MD     • albuterol  2 5 mg Nebulization Q4H PRN Vickie Roach DO     • aspirin  81 mg Oral Daily Meredith Gaona MD     • atorvastatin  40 mg Oral Daily With Brady Jordan MD     • bisacodyl  10 mg Rectal Daily PRN Ck Andrea MD     • fluticasone-vilanterol  1 puff Inhalation Daily Ck Andrea MD     • gabapentin  300 mg Oral BID Ck Andrea MD     • hydrALAZINE  10 mg Intravenous Q6H PRN Ck Andrea MD     • hydrALAZINE  25 mg Oral Q8H [address] Ck Andrea MD     • HYDROmorphone  1 mg Intravenous Q4H PRN Ck Ave, MD     • insulin glargine  40 Units Subcutaneous HS Ck Andrea MD     • insulin lispro  14 Units Subcutaneous TID With Meals Ck Ave, MD     • insulin lispro  2-12 Units Subcutaneous TID AC Ck Ave, MD     • insulin lispro  2-12 Units Subcutaneous HS Ck Andrea MD     • melatonin  6 mg Oral HS Ck Andrea MD     • methocarbamol  500 mg Oral Q6H [address] Ck Andrea MD     • naloxone  0 04 mg Intravenous Q1MIN PRN Ck Ave, MD     • nicotine  21 mg Transdermal Daily Ck Andrea MD     • oxyCODONE  10 mg Oral Q4H PRN Ck Andrea MD     • oxyCODONE  5 mg Oral Q4H PRN Ck Andrea MD     • polyethylene glycol  17 g Oral Daily Ck Andrea MD     • prazosin  1 mg Oral HS Ck [address], MD     • QUEtiapine  200 mg Oral HS Ck Andrea MD     • rivaroxaban  15 mg Oral BID With Meals Glendy Joy PA-C     • senna-docusate sodium  2 tablet Oral BID Ck Andrea MD     • simethicone  80 mg Oral Q6H PRN Ck Andrea MD     • topiramate  25 mg Oral BID Ck Andrea MD          Today, Patient Was Seen By: Mary Diaz DO    ** Please Note: Dictation voice to text software may have been used in the creation of this document   **

## 2023-04-04 NOTE — PLAN OF CARE
Problem: MOBILITY - ADULT  Goal: Maintain or return to baseline ADL function  Description: INTERVENTIONS:  -  Assess patient's ability to carry out ADLs; assess patient's baseline for ADL function and identify physical deficits which impact ability to perform ADLs (bathing, care of mouth/teeth, toileting, grooming, dressing, etc )  - Assess/evaluate cause of self-care deficits   - Assess range of motion  - Assess patient's mobility; develop plan if impaired  - Assess patient's need for assistive devices and provide as appropriate  - Encourage maximum independence but intervene and supervise when necessary  - Involve family in performance of ADLs  - Assess for home care needs following discharge   - Consider OT consult to assist with ADL evaluation and planning for discharge  - Provide patient education as appropriate  Outcome: Progressing  Goal: Maintains/Returns to pre admission functional level  Description: INTERVENTIONS:  - Perform BMAT or MOVE assessment daily    - Set and communicate daily mobility goal to care team and patient/family/caregiver     - Collaborate with rehabilitation services on mobility goals if consulted  Outcome: Progressing     Problem: Prexisting or High Potential for Compromised Skin Integrity  Goal: Skin integrity is maintained or improved  Description: INTERVENTIONS:  - Identify patients at risk for skin breakdown  - Assess and monitor skin integrity  - Assess and monitor nutrition and hydration status  - Monitor labs   - Assess for incontinence   - Turn and reposition patient  - Assist with mobility/ambulation  - Relieve pressure over bony prominences  - Avoid friction and shearing  - Provide appropriate hygiene as needed including keeping skin clean and dry  - Evaluate need for skin moisturizer/barrier cream  - Collaborate with interdisciplinary team   - Patient/family teaching  - Consider wound care consult   Outcome: Progressing     Problem: Nutrition/Hydration-ADULT  Goal: Nutrient/Hydration intake appropriate for improving, restoring or maintaining nutritional needs  Description: Monitor and assess patient's nutrition/hydration status for malnutrition  Collaborate with interdisciplinary team and initiate plan and interventions as ordered  Monitor patient's weight and dietary intake as ordered or per policy  Utilize nutrition screening tool and intervene as necessary  Determine patient's food preferences and provide high-protein, high-caloric foods as appropriate       INTERVENTIONS:  - Monitor oral intake, urinary output, labs, and treatment plans  - Assess nutrition and hydration status and recommend course of action  - Evaluate amount of meals eaten  - Assist patient with eating if necessary   - Allow adequate time for meals  - Recommend/ encourage appropriate diets, oral nutritional supplements, and vitamin/mineral supplements  - Order, calculate, and assess calorie counts as needed  - Recommend, monitor, and adjust tube feedings and TPN/PPN based on assessed needs  - Assess need for intravenous fluids  - Provide specific nutrition/hydration education as appropriate  - Include patient/family/caregiver in decisions related to nutrition  Outcome: Progressing     Problem: SAFETY,RESTRAINT: NV/NON-SELF DESTRUCTIVE BEHAVIOR  Goal: Remains free of harm/injury (restraint for non violent/non self-detsructive behavior)  Description: INTERVENTIONS:  - Instruct patient/family regarding restraint use   - Assess and monitor physiologic and psychological status   - Provide interventions and comfort measures to meet assessed patient needs   - Identify and implement measures to help patient regain control  - Assess readiness for release of restraint   Outcome: Progressing  Goal: Returns to optimal restraint-free functioning  Description: INTERVENTIONS:  - Assess the patient's behavior and symptoms that indicate continued need for restraint  - Identify and implement measures to help patient regain control  - Assess readiness for release of restraint   Outcome: Progressing

## 2023-04-04 NOTE — ASSESSMENT & PLAN NOTE
Lab Results   Component Value Date    HGBA1C 9 8 (A) 12/20/2022       Recent Labs     04/03/23  1610 04/03/23  2107 04/04/23  0738 04/04/23  1139   POCGLU 277* 275* 173* 230*       Blood Sugar Average: Last 72 hrs:  (P) 194 5     Diabetes mellitus type 2 uncontrolled  Endocrinology following   Cont insulin regimen as per their recommendations  Monitor Accu-Cheks  Avoid hypoglycemia  Hypoglycemia protocol in place

## 2023-04-04 NOTE — ASSESSMENT & PLAN NOTE
Acute respiratory distress present on admission  Postoperatively patient was noted to have bronchospasm requiring reintubation admission to the ICU  Hx of asthma   S/p completion of steroid taper  Extubated successfully  Respiratory distress resolved; on RA  Encourage incentive spirometry  Cont pulm inhalers

## 2023-04-04 NOTE — CASE MANAGEMENT
Case Management Progress Note    Patient name Jaci aRmos  [facility] [id number]  : 1985 Date 2023       LOS (days): 11  Geometric Mean LOS (GMLOS) (days): 4 30  Days to GMLOS:-6 6        OBJECTIVE:        Current admission status: Inpatient  Preferred Pharmacy:   [address] [place] [address], [address] [place] [id number]  Phone: [phone] Fax: [phone]    [facility] - [place], [address] [facility] [facility] [place] [address] [place] [address] [address]  Phone: [phone] Fax: [phone] [address] [place], [facility] [place] [address] [place] [address] [facility] [address] [place] [address] [address] [facility] [address] [address]  Phone: [phone] Fax: [phone]    Primary Care Provider: Mike Agee MD    Primary Insurance: [employer] REP  Secondary Insurance: [facility] NOTE:  Pt's clinical was faxed to [address] and Adult Services to begin the Option process  CM will continue to follow

## 2023-04-05 PROBLEM — D64.9 ANEMIA: Status: ACTIVE | Noted: 2023-04-05

## 2023-04-05 PROBLEM — D72.829 LEUKOCYTOSIS: Status: ACTIVE | Noted: 2023-04-05

## 2023-04-05 LAB
ANION GAP SERPL CALCULATED.3IONS-SCNC: 3 MMOL/L (ref 4–13)
APTT PPP: 33 SECONDS (ref 23–37)
BUN SERPL-MCNC: 13 MG/DL (ref 5–25)
CALCIUM SERPL-MCNC: 8.4 MG/DL (ref 8.3–10.1)
CHLORIDE SERPL-SCNC: 107 MMOL/L (ref 96–108)
CO2 SERPL-SCNC: 26 MMOL/L (ref 21–32)
CREAT SERPL-MCNC: 0.47 MG/DL (ref 0.6–1.3)
ERYTHROCYTE [DISTWIDTH] IN BLOOD BY AUTOMATED COUNT: 15.9 % (ref 11.6–15.1)
FERRITIN SERPL-MCNC: 54 NG/ML (ref 8–388)
GFR SERPL CREATININE-BSD FRML MDRD: 127 ML/MIN/1.73SQ M
GLUCOSE SERPL-MCNC: 155 MG/DL (ref 65–140)
GLUCOSE SERPL-MCNC: 165 MG/DL (ref 65–140)
GLUCOSE SERPL-MCNC: 210 MG/DL (ref 65–140)
GLUCOSE SERPL-MCNC: 222 MG/DL (ref 65–140)
GLUCOSE SERPL-MCNC: 225 MG/DL (ref 65–140)
HCT VFR BLD AUTO: 25.4 % (ref 34.8–46.1)
HGB BLD-MCNC: 8.7 G/DL (ref 11.5–15.4)
MCH RBC QN AUTO: 25.8 PG (ref 26.8–34.3)
MCHC RBC AUTO-ENTMCNC: 34.3 G/DL (ref 31.4–37.4)
MCV RBC AUTO: 75 FL (ref 82–98)
PLATELET # BLD AUTO: 391 THOUSANDS/UL (ref 149–390)
PMV BLD AUTO: 9.5 FL (ref 8.9–12.7)
POTASSIUM SERPL-SCNC: 3.9 MMOL/L (ref 3.5–5.3)
RBC # BLD AUTO: 3.37 MILLION/UL (ref 3.81–5.12)
SODIUM SERPL-SCNC: 136 MMOL/L (ref 135–147)
WBC # BLD AUTO: 14.52 THOUSAND/UL (ref 4.31–10.16)

## 2023-04-05 NOTE — PHYSICAL THERAPY NOTE
Physical Therapy Treatment    Patient's Name: Jennifer Tello    Admitting Diagnosis  PAD (peripheral artery disease) ([place] [address] ) [I73 9]    Problem List  Patient Active Problem List   Diagnosis   • Headache   • Essential hypertension   • Type 2 diabetes mellitus with diabetic polyneuropathy, with long-term current use of insulin ([place] [address] )   • Paresthesia   • COPD (chronic obstructive pulmonary disease) ([facility])   • Nicotine dependence   • Bipolar disorder ([place] [address] )   • Leg pain   • Bilateral leg pain   • Diarrhea   • PAD (peripheral artery disease) ([facility])   • BMI 45 0-49 9, adult ([place] [address] )   • Bursitis of left knee   • S/P BKA (below knee amputation) unilateral, left ([facility])   • Atherosclerosis of left leg ([facility])   • Positive D dimer   • Surgical wound breakdown, sequela   • Class 3 severe obesity due to excess calories without serious comorbidity with body mass index (BMI) of 40 0 to 44 9 in adult [facility])   • Acute respiratory distress   • Anemia   • Leukocytosis       Past Medical History  Past Medical History:   Diagnosis Date   • Asthma    • Atherosclerosis    • Bipolar 1 disorder ([facility])    • COPD (chronic obstructive pulmonary disease) ([facility])    • Depression    • Diabetes mellitus ([place] [address] )    • Hypertension    • Psychiatric disorder     cutting history   • PTSD (post-traumatic stress disorder)    • Schizoaffective disorder ([place] [address] )    • Tendonitis        Past Surgical History  Past Surgical History:   Procedure Laterality Date   • AMPUTATION ABOVE KNEE (AKA) Left 3/29/2023    Procedure: AMPUTATION ABOVE KNEE (AKA); Surgeon: Sajan Bravo MD;  Location: [facility];  Service: Vascular   • BYPASS FEMORAL-POPLITEAL Left 2021    Procedure: Left Common Femoral Below Knee to Popliteal Bypass with Insitu GSV graft    Left Lower Extremity Angiogram;  Surgeon: Sajan Bravo MD;  [facility];  Service: Vascular   •  SECTION     • EAR SURGERY     • IR LOWER EXTREMITY ANGIOGRAM  2021   • IR LOWER EXTREMITY ANGIOGRAM 08/25/2021   • [place] SLCTV CATHJ 3RD+ ORD SLCTV ABDL PEL/LXTR [facility] Left 3/24/2023    Procedure: Left leg angiogram;  Surgeon: Regis Pickard DO;  [facility]: [facility];  Service: Vascular   • TUBAL LIGATION            04/05/23 1159   PT Last Visit   PT Visit Date 04/05/23   Pain Assessment   Pain Assessment Tool 0-10   Pain Score 8   Pain Location/Orientation Orientation: Left; Location: Leg   Hospital Pain Intervention(s) Repositioned; Ambulation/increased activity   Restrictions/Precautions   Weight Bearing Precautions Per Order Yes   LLE Weight Bearing Per Order NWB  (L AKA)   Other Precautions Impulsive; Fall Risk;Pain   General   Family/Caregiver Present Yes  (Wife present at end of session)   Cognition   Overall Cognitive Status WFL   Attention Within functional limits   Orientation Level Oriented X4   Memory Within functional limits   Following Commands Follows one step commands without difficulty   Comments pt pleasant and cooperative t/o therapy session  Pt is highly motivated to improve her functional mobility and participate in therapy session   Bed Mobility   Supine to Sit 4  Minimal assistance   Additional items Increased time required;Verbal cues;LE management;Assist x 1;HOB elevated; Bedrails   Sit to Supine Unable to assess   Additional Comments Upon arrival, pt supine in bed  Followign PT session, pt left sitting EOB with wife present and all needs within reach   Transfers   Sit to Stand 4  Minimal assistance   Additional items Assist x 1;Assist x 2; Increased time required  (Initial STS Mod Ax2, progressed to Min Ax1 with increased repetitions)   Stand to Sit 4  Minimal assistance   Additional items Increased time required;Assist x 1;Assist x 2  (Initial STS Mod Ax2, progressed to [facility] with increased repetitions)   Stand pivot 4  Minimal assistance   Additional items Assist x 2;Assist x 1  (Initial SPT Min Ax2- progressed to [address] [facility]   Pt hops from EOB to [facility])   Sit pivot 4  Minimal assistance Additional items Assist x 1;Assist x 2; Increased time required   Toilet transfer 4  Minimal assistance   Additional items Assist x 1;Assist x 2; Increased time required   Additional Comments Used RW for all transfers; 10x STS performed throughout session   Ambulation/Elevation   Gait pattern Shuffling;Decreased foot clearance; Inconsistent juanita; Foward flexed  (hopping)   Gait Assistance 4  Minimal assist   Additional items Assist x 1;Verbal cues   Assistive Device Rolling walker   Distance 3'  (x2 trials)   Ambulation/Elevation Additional Comments Ambulation limited 2* pain, fatigue and RLE weakness   Balance   Static Sitting Fair +   Dynamic Sitting Fair -   Static Standing Fair -   Dynamic Standing Poor +   Ambulatory Poor +   Endurance Deficit   Endurance Deficit Yes   Endurance Deficit Description RLE fatigue, LLE phantom pain, fatigue, deconditioning   Activity Tolerance   Activity Tolerance Patient limited by fatigue;Patient limited by pain   Medical Staff Made Aware VIOLETA triana treatment 2* medical complexity, multiple co-morbidities and assist x2 for functional mobility  CM informed of pt progress   Nurse Made Aware RN cleared pt for PT session   Assessment   Prognosis Good   Problem List Decreased strength;Decreased range of motion;Decreased endurance; Impaired balance;Decreased mobility; Decreased safety awareness;Pain;Orthopedic restrictions   Assessment Pt seen for PT treatment session this date  Therapy session focused on sitting balance, standing tolerance/balance, bed mobility and functional transfer training in order to improve overall mobility and independence  Pt requires min Ax for supine to sit EOB and occassional CGA during dynamic sitting balance activities with pt demonstrating improved core strength with ability to maintain sitting balance against perturbations and reaching outside ELENA  Pt completed STS transfers x10 with RW t/o PT session with seated rest breaks in between   Initial STS required Mod Ax2 with pt progressing to min Ax1 with increased repetitions  Pt motivated to use BSC vs bed pan and demonstrated improved functional mobility completing stand/sit pivot transfer from sitting EOB to BSC with Min Ax1 and RW using hopping technique  Pt stood for 2 min x2 trials with RW and CGA completing dynamic balance tasks including perturbations, head turns and reaching outside ELENA  During PT session, pt reported increased L LE phantom pain  Educated patient on techniques to decrease pain including desensitization of residual limb and visualization of BL limb movement  Plan to incorporate WC mobility and transfers next session to improve pt functional independence and safety for DC  Pt making good progress toward goals  Pt was left sitting EOB with wife at the end of PT session with all needs in reach  Pt would benefit from continued PT services while in hospital to address remaining limitations  PT to continue treating pt and recommends post-acute rehab services  The patient's AM-PAC Basic Mobility Inpatient Short Form Raw Score is 14  A Raw score of less than or equal to 16 suggests the patient may benefit from discharge to post-acute rehabilitation services  Please also refer to the recommendation of the Physical Therapist for safe discharge planning  Barriers to Discharge Decreased caregiver support; Inaccessible home environment   Goals   Patient Goals to get to [facility]   STG Expiration Date 04/19/23   PT Treatment Day 1   Plan   Treatment/Interventions Functional transfer training;LE strengthening/ROM; Therapeutic exercise; Endurance training;Patient/family training;Equipment eval/education; Bed mobility;Gait training; Compensatory technique education;Spoke to nursing;Spoke to case management;OT   PT Frequency 3-5x/wk   Recommendation   PT Discharge Recommendation Post acute rehabilitation services   Equipment Recommended Wheelchair;Walker   [place] Recommended Wheeled walker   AM-PAC Basic Mobility Inpatient   Turning in Flat Bed Without Bedrails 3   Lying on Back to Sitting on Edge of Flat Bed Without Bedrails 3   Moving Bed to Chair 3   Standing Up From Chair Using Arms 3   Walk in Room 1   Climb 3-5 Stairs With Railing 1   Basic Mobility Inpatient Raw Score 14   Basic Mobility Standardized Score 35 55   Highest Level Of Mobility   JH-HLM Goal 4: Move to chair/commode   JH-HLM Achieved 5: Stand (1 or more minutes)   Exercises   Balance training  Standing balance activities with unilateral UE support at RW for 2 min x2 with seated rest break in between  CGA for balance and safety   End of Consult   Patient Position at End of Consult Seated edge of bed; All needs within reach  (With wife present)     Madhavi De La Cruz SPT  1:58 PM  04/05/23

## 2023-04-05 NOTE — PLAN OF CARE
Problem: PHYSICAL THERAPY ADULT  Goal: Performs mobility at highest level of function for planned discharge setting  See evaluation for individualized goals  Description: Treatment/Interventions: OT, Spoke to case management, Spoke to nursing, Gait training, Bed mobility, Patient/family training, Endurance training, Functional transfer training, LE strengthening/ROM  Equipment Recommended: Wheelchair, [place] Musty       See flowsheet documentation for full assessment, interventions and recommendations  Outcome: Progressing  Note: Prognosis: Good  Problem List: Decreased strength, Decreased range of motion, Decreased endurance, Impaired balance, Decreased mobility, Decreased safety awareness, Pain, Orthopedic restrictions  Assessment: Pt seen for PT treatment session this date  Therapy session focused on sitting balance, standing tolerance/balance, bed mobility and functional transfer training in order to improve overall mobility and independence  Pt requires min Ax for supine to sit EOB and occassional CGA during dynamic sitting balance activities with pt demonstrating improved core strength with ability to maintain sitting balance against perturbations and reaching outside ELENA  Pt completed STS transfers x10 with RW t/o PT session with seated rest breaks in between  Initial STS required Mod Ax2 with pt progressing to min Ax1 with increased repetitions  Pt motivated to use BSC vs bed pan and demonstrated improved functional mobility completing stand/sit pivot transfer from sitting EOB to BSC with Min Ax1 and RW using hopping technique  Pt stood for 2 min x2 trials with RW and CGA completing dynamic balance tasks including perturbations, head turns and reaching outside ELENA  During PT session, pt reported increased L LE phantom pain  Educated patient on techniques to decrease pain including desensitization of residual limb and visualization of BL limb movement   Plan to incorporate WC mobility and transfers next session to improve pt functional independence and safety for DC  Pt making good progress toward goals  Pt was left sitting EOB with wife at the end of PT session with all needs in reach  Pt would benefit from continued PT services while in hospital to address remaining limitations  PT to continue treating pt and recommends post-acute rehab services  The patient's AM-PAC Basic Mobility Inpatient Short Form Raw Score is 14  A Raw score of less than or equal to 16 suggests the patient may benefit from discharge to post-acute rehabilitation services  Please also refer to the recommendation of the Physical Therapist for safe discharge planning  Barriers to Discharge: Decreased caregiver support, Inaccessible home environment     PT Discharge Recommendation: Post acute rehabilitation services    See flowsheet documentation for full assessment

## 2023-04-05 NOTE — ASSESSMENT & PLAN NOTE
Hx of occluded bypass and profunda thrombus  Continue aspirin/statin  Continue xarelto, on 15mg bid x 21 days then 20mg qd  Vascular surgery inputs noted  S/p hematology consult complete thrombophilia evaluation as outpt   Deemed no need to obtain here as has been already on a/c likely to skew results

## 2023-04-05 NOTE — PLAN OF CARE
Problem: OCCUPATIONAL THERAPY ADULT  Goal: Performs self-care activities at highest level of function for planned discharge setting  See evaluation for individualized goals  Description: Treatment Interventions: ADL retraining, Functional transfer training, UE strengthening/ROM, Endurance training, Patient/family training, Compensatory technique education, Continued evaluation, Energy conservation, Activityengagement          See flowsheet documentation for full assessment, interventions and recommendations  Outcome: Progressing  Note: Limitation: Decreased ADL status, Decreased UE strength, Decreased Safe judgement during ADL, Decreased endurance, Decreased self-care trans, Decreased high-level ADLs  Prognosis: Good  Assessment: Patient participated in Skilled OT session this date with interventions consisting of ADL re training with the use of correct body mechnaics, Energy Conservation techniques, safety awareness and fall prevention techniques,  therapeutic activities to: increase activity tolerance, increase dynamic sit/ stand balance during functional activity  and increase OOB/ sitting tolerance   Upon arrival patient was found supine in bed  Pt demonstrated the following tasks: MIN A sup to sit, MIN A LBD, and MOD A toileting  Pt performed multiple STS transfers during session, initially required MOD A x 2 but progressed to MIN A x 1  Pt initially required MIN A x 2 for SPT and sit pivot transfers with RW, but progressed to MIN A x 1  Pt also performed BSC transfer with MIN A  Pt engaged in dynamic standing balance task with reaching into all planes with B/L UEs for fnxl items  Patient continues to be functioning below baseline level, occupational performance remains limited secondary to factors listed above and increased risk for falls and injury  From OT standpoint, recommendation at time of d/c would be STR - pending progress    Patient to benefit from continued Occupational Therapy treatment while in the hospital to address deficits as defined above and maximize level of functional independence with ADLs and functional mobility  Pt was left after session with all current needs met  The patient's raw score on the AM-PAC Daily Activity Inpatient Short Form is 20  A raw score of greater than or equal to 19 suggests the patient may benefit from discharge to home  Please refer to the recommendation of the Occupational Therapist for safe discharge planning       OT Discharge Recommendation: Post acute rehabilitation services (pending progress)

## 2023-04-05 NOTE — PROGRESS NOTES
[facility]  Progress Note  Name: Shagufta Mann  MRN: [id number]  [facility] Date of Admission: 3/24/2023   Date of Service: 4/5/2023 I Hospital Day: 12    Assessment/Plan   * S/P BKA (below knee amputation) unilateral, left ([facility] )  Assessment & Plan  S/p left BKA with vascular surgery  Vascular surgery inputs noted  APS on board for pain management    PAD (peripheral artery disease) ([facility] )  Assessment & Plan  Hx of occluded bypass and profunda thrombus  Continue aspirin/statin  Continue xarelto, on 15mg bid x 21 days then 20mg qd  Vascular surgery inputs noted  S/p hematology consult complete thrombophilia evaluation as outpt  Deemed no need to obtain here as has been already on a/c likely to skew results    Leukocytosis  Assessment & Plan  Likely reactive, trended down  No focal source of infection    Anemia  Assessment & Plan  Stable  Cont to monitor    Acute respiratory distress  Assessment & Plan  Acute respiratory distress present on admission  Postoperatively patient was noted to have bronchospasm requiring reintubation admission to the ICU  Hx of asthma   S/p completion of steroid taper  Extubated successfully  Respiratory distress resolved; on RA  Encourage incentive spirometry  Cont pulm inhalers    BMI 45 0-49 9, adult [facility])  Assessment & Plan  Therapeutic lifestyle modification encouraged  Outpatient sleep study recommended    Bipolar disorder ([place] [address] )  Assessment & Plan  Continue Seroquel, topiramate  S/p psych consult, cleared for snf discharge    Nicotine dependence  Assessment & Plan  Smoking cessation discussed and encouraged    Type 2 diabetes mellitus with diabetic polyneuropathy, with long-term current use of insulin [facility])  Assessment & Plan  Lab Results   Component Value Date    HGBA1C 9 8 (A) 12/20/2022       Recent Labs     04/04/23  1558 04/04/23  2200 04/05/23  0834 04/05/23  1115   POCGLU 240* 226* 210* 165*       Blood Sugar Average: Last 72 hrs:  (P) 465 [id number]     Diabetes mellitus type 2 uncontrolled  Endocrinology following  Cont insulin regimen as per their recommendations  Hypoglycemia protocol in place      Essential hypertension  Assessment & Plan  Stable       VTE Pharmacologic Prophylaxis:   Pharmacologic: Rivaroxaban (Xarelto)  Mechanical VTE Prophylaxis in Place: No    Patient Centered Rounds: I have performed bedside rounds with nursing staff today  Discussions with Specialists or Other Care Team Provider:     Education and Discussions with Family / Patient: Patient    Time Spent for Care: 30 minutes  More than 50% of total time spent on counseling and coordination of care as described above  Current Length of Stay: 12 day(s)    Current Patient Status: Inpatient   Certification Statement: The patient will continue to require additional inpatient hospital stay due to Awaiting placement  Needs to be optioned    Discharge Plan: SNF    Code Status: Level 1 - Full Code      Subjective:   Sleeping comfortably, arousable  Not in painful distress  No acute events overnight  Objective:     Vitals:   Temp (24hrs), Av 4 °F (36 3 °C), Min:97 3 °F (36 3 °C), Max:97 5 °F (36 4 °C)    Temp:  [97 3 °F (36 3 °C)-97 5 °F (36 4 °C)] 97 3 °F (36 3 °C)  HR:  [96-98] 98  Resp:  [16-20] 16  BP: (135-169)/(83-99) 135/85  SpO2:  [97 %-98 %] 97 %  Body mass index is 52 07 kg/m²  Input and Output Summary (last 24 hours): Intake/Output Summary (Last 24 hours) at 2023 1341  Last data filed at 2023 2100  Gross per 24 hour   Intake --   Output 350 ml   Net -350 ml       Physical Exam:     Physical Exam  Constitutional:       Appearance: She is obese  Comments: Sleeping but arousable by tactile stimuli  Alert oriented x3   Cardiovascular:      Rate and Rhythm: Normal rate and regular rhythm  Pulses: Normal pulses  Heart sounds: Normal heart sounds  No murmur heard    Pulmonary:      Effort: Pulmonary effort is normal  No respiratory distress  Breath sounds: Normal breath sounds  No wheezing or rales  Abdominal:      General: Bowel sounds are normal  There is no distension  Palpations: Abdomen is soft  Tenderness: There is no abdominal tenderness  There is no guarding  Musculoskeletal:      Cervical back: Normal range of motion and neck supple  Right lower leg: No edema  Left lower leg: No edema  Comments: Left BKA   Skin:     General: Skin is warm and dry  Neurological:      General: No focal deficit present  Mental Status: She is oriented to person, place, and time  Mental status is at baseline  Cranial Nerves: No cranial nerve deficit  Motor: No weakness  Additional Data:     Labs:    Results from last 7 days   Lab Units 04/05/23  0742   WBC Thousand/uL 14 52*   HEMOGLOBIN g/dL 8 7*   HEMATOCRIT % 25 4*   PLATELETS Thousands/uL 391*     Results from last 7 days   Lab Units 04/05/23  0742 03/30/23  0612   SODIUM mmol/L 136 136   POTASSIUM mmol/L 3 9 3 7   CHLORIDE mmol/L 107 103   CO2 mmol/L 26 28   BUN mg/dL 13 16   CREATININE mg/dL 0 47* 0 44*   ANION GAP mmol/L 3* 5   CALCIUM mg/dL 8 4 8 1*   ALBUMIN g/dL  --  2 0*   TOTAL BILIRUBIN mg/dL  --  0 32   ALK PHOS U/L  --  83   ALT U/L  --  69   AST U/L  --  58*   GLUCOSE RANDOM mg/dL 225* 196*         Results from last 7 days   Lab Units 04/05/23  1115 04/05/23  0834 04/04/23  2200 04/04/23  1558 04/04/23  1139 04/04/23  0738 04/03/23  2107 04/03/23  1610 04/03/23  1137 04/03/23  0719 04/02/23  2131 04/02/23  1624   POC GLUCOSE mg/dl 165* 210* 226* 240* 230* 173* 275* 277* 144* 214* 239* 241*                   * I Have Reviewed All Lab Data Listed Above  * Additional Pertinent Lab Tests Reviewed:  All Labs Within Last 24 Hours Reviewed    Imaging:    Imaging Reports Reviewed Today Include:   Imaging Personally Reviewed by Myself Includes:      Recent Cultures (last 7 days):           Last 24 Hours Medication List:   Current Facility-Administered Medications   Medication Dose Route Frequency Provider Last Rate   • acetaminophen  975 mg Oral Q8H [address] Elner Duane, MD     • albuterol  2 puff Inhalation Q4H PRN Elner Duane, MD     • albuterol  2 5 mg Nebulization Q4H PRN Savannah Sylvester DO     • aspirin  81 mg Oral Daily Elner Duane, MD     • atorvastatin  40 mg Oral Daily With Pat Reina MD     • bisacodyl  10 mg Rectal Daily PRN Elner Duane, MD     • fluticasone-vilanterol  1 puff Inhalation Daily Elner Duane, MD     • gabapentin  300 mg Oral BID Elner Duane, MD     • hydrALAZINE  10 mg Intravenous Q6H PRN Elner Duane, MD     • hydrALAZINE  25 mg Oral Q8H [address] Elner Duane, MD     • HYDROmorphone  1 mg Intravenous Q4H PRN Elner Duane, MD     • insulin glargine  40 Units Subcutaneous HS Elner Duane, MD     • insulin lispro  18 Units Subcutaneous TID With Meals Emma Donaldson DO     • insulin lispro  2-12 Units Subcutaneous TID AC Elner Duane, MD     • insulin lispro  2-12 Units Subcutaneous HS Elner Duane, MD     • melatonin  6 mg Oral HS Elner Duane, MD     • methocarbamol  500 mg Oral Q6H [address] Elner Duane, MD     • naloxone  0 04 mg Intravenous Q1MIN PRN Elner Duane, MD     • nicotine  21 mg Transdermal Daily Elner Duane, MD     • oxyCODONE  10 mg Oral Q4H PRN Elner Duane, MD     • oxyCODONE  5 mg Oral Q4H PRN Elner Duane, MD     • polyethylene glycol  17 g Oral Daily Elner Duane, MD     • prazosin  1 mg Oral HS Elner Duane, MD     • QUEtiapine  200 mg Oral HS Elner Duane, MD     • rivaroxaban  15 mg Oral BID With Meals Paz Dyer PA-C     • senna-docusate sodium  2 tablet Oral BID Elner Duane, MD     • simethicone  80 mg Oral Q6H PRN Elner Duane, MD     • topiramate  25 mg Oral BID Teresa Goss MD          Today, Patient Was Seen By: Abdiel Vazquez DO    ** Please Note: Dictation voice to text software may have been used in the creation of this document   **

## 2023-04-05 NOTE — ASSESSMENT & PLAN NOTE
Lab Results   Component Value Date    HGBA1C 9 8 (A) 12/20/2022       Recent Labs     04/04/23  1558 04/04/23  2200 04/05/23  0834 04/05/23  1115   POCGLU 240* 226* 210* 165*       Blood Sugar Average: Last 72 hrs:  (P) 209 [id number]     Diabetes mellitus type 2 uncontrolled  Endocrinology following   Cont insulin regimen as per their recommendations  Hypoglycemia protocol in place

## 2023-04-05 NOTE — PROGRESS NOTES
"Progress Note - Marleni Finn 40 y o  female MRN: [id number]    Unit/Bed#: [facility] 904-01 Encounter: [id number]      CC: diabetes f/u    Subjective:   Marleni Finn is a 40y o  year old female with type 2 DM s/p L BKA  Pain in lower extremity is slightly improved today  She has no other complaints, reports appetite is the same  Objective:     Vitals: Blood pressure 138/83, pulse 98, temperature (!) 97 3 °F (36 3 °C), resp  rate 16, height 5' 1\" (1 549 m), weight 125 kg (275 lb 9 2 oz), SpO2 97 %, not currently breastfeeding  ,Body mass index is 52 07 kg/m²  Intake/Output Summary (Last 24 hours) at 4/5/2023 8265  Last data filed at 4/4/2023 2100  Gross per 24 hour   Intake 480 ml   Output 350 ml   Net 130 ml       Physical Exam:  General Appearance: awake, appears stated age and cooperative  Head: Normocephalic, without obvious abnormality, atraumatic  Extremities: moves all extremities  Skin: Skin color and temperature normal    Pulm: no labored breathing    Lab, Imaging and other studies: I have personally reviewed pertinent reports  POC Glucose (mg/dl)   Date Value   04/05/2023 210 (H)   04/04/2023 226 (H)   04/04/2023 240 (H)   04/04/2023 230 (H)   04/04/2023 173 (H)   04/03/2023 275 (H)   04/03/2023 277 (H)   04/03/2023 144 (H)   04/03/2023 214 (H)   04/02/2023 239 (H)       Assessment and plan:  Plan:  Type 2 diabetes  Steroid induced hyperglycemia is resolved  Most recent A1c 9 8  Home regimen: metformin 1000 mg twice daily, Lantus 20 units twice daily, lispro 21 units with each meal, and Trulicity 1 5 mg administered on Wednesday  Inpatient regimen: 40 units lantus daily at bedtime, 14 units humalog with meals, and correctional coverage  Increase mealtime insulin to 18 units humalog, continue lantus 40 units at bedtime  Continue to monitor blood glucoses and make adjustments as needed over time  Portions of the record may have been created with voice recognition software     "

## 2023-04-05 NOTE — RESTORATIVE TECHNICIAN NOTE
Restorative Technician Note      Patient Name: Danis Mari     Restorative Tech Visit Date: 04/05/23  Note Type: Mobility  Patient Position Upon Consult: Supine  Activity Performed: Repositioned  Assistive Device: Other (Comment) (Ax1 to roll for ADLs)  Patient Position at End of Consult: Supine;  All needs within [facility]

## 2023-04-05 NOTE — OCCUPATIONAL THERAPY NOTE
Occupational Therapy Progress Note     Patient Name: Rylee Harris  OVCBU'R Date: 4/5/2023  Problem List  Principal Problem:    S/P BKA (below knee amputation) unilateral, left ([facility])  Active Problems:    Essential hypertension    Type 2 diabetes mellitus with diabetic polyneuropathy, with long-term current use of insulin ([facility])    Nicotine dependence    Bipolar disorder ([facility])    PAD (peripheral artery disease) ([facility])    BMI 45 0-49 9, adult ([facility] [address] )    Acute respiratory distress    Anemia    Leukocytosis            04/05/23 1201   OT Last Visit   OT Visit Date 04/05/23   Note Type   Note Type Treatment   Pain Assessment   Pain Assessment Tool 0-10   Pain Score 8   Pain Location/Orientation Orientation: Left; Location: Leg   Hospital Pain Intervention(s) Repositioned; Ambulation/increased activity   Restrictions/Precautions   Weight Bearing Precautions Per Order Yes   LLE Weight Bearing Per Order NWB  (L AKA)   Other Precautions Fall Risk;Pain   Lifestyle   Autonomy I with ADLS, shares IALD with wife Does not drive   Reciprocal Relationships supportive wife   Service to Others not working   Intrinsic Gratification Shopping, spending time with her wife   ADL   Where Assessed Edge of bed   LB Dressing Assistance 4  Minimal Assistance   LB Dressing Deficit Don/doff R sock; Thread LLE into pants; Thread RLE into pants; Thread RLE into underwear; Thread LLE into underwear;Pull up over hips   Toileting Assistance  3  Moderate Assistance   Toileting Deficit Perineal hygiene   Functional Standing Tolerance   Time 2 x ~ 5 minutes   Activity Dyanmic standing balance with fnxl reaching   Comments Pt engaged in standing task with use of RW and Ax1  Pt utilized B/L UEs to perform fnxl reaching into all planes   Bed Mobility   Supine to Sit 4  Minimal assistance   Additional items Assist x 1;HOB elevated; Bedrails; Increased time required   Sit to Supine Unable to assess   Transfers   Sit to Stand 4  Minimal assistance   Additional items Assist x 1;Assist x 2; Increased time required   Stand to Sit 4  Minimal assistance   Additional items Assist x 1;Assist x 2; Increased time required;Verbal cues   Stand pivot 4  Minimal assistance   Additional items Assist x 1;Assist x 2; Increased time required   Sit pivot 4  Minimal assistance   Additional items Assist x 1;Assist x 2; Increased time required;Verbal cues   Toilet transfer 4  Minimal assistance   Additional items Assist x 1; Increased time required;Commode   Additional Comments Pt initially requires MOD A x 2 for STS transfer, progressed to MIN A x 1  Pt initially performs SPT/sit pivot transfer with MIN A  x 2, progressed to MIN A x 1   Functional Mobility   Functional Mobility 4  Minimal assistance   Additional Comments MIN A x 1 + SBA of another for hops from bed <> [facility]   Cognition   Overall Cognitive Status [facility]   Arousal/Participation Alert; Responsive; Cooperative   Attention Within functional limits   Orientation Level Oriented X4   Memory Within functional limits   Following Commands Follows one step commands without difficulty   Comments Pt pleasant and cooperative t/o session   Activity Tolerance   Activity Tolerance Patient tolerated treatment well   Medical Staff Made Aware PT Jeffrie Jeans, RN   Assessment   Assessment Patient participated in Skilled OT session this date with interventions consisting of ADL re training with the use of correct body mechnaics, Energy Conservation techniques, safety awareness and fall prevention techniques,  therapeutic activities to: increase activity tolerance, increase dynamic sit/ stand balance during functional activity  and increase OOB/ sitting tolerance   Upon arrival patient was found supine in bed  Pt demonstrated the following tasks: MIN A sup to sit, MIN A LBD, and MOD A toileting  Pt performed multiple STS transfers during session, initially required MOD A x 2 but progressed to MIN A x 1   Pt initially required MIN A x 2 for SPT and sit pivot transfers with RW, but progressed to MIN A x 1  Pt also performed BSC transfer with MIN A  Pt engaged in dynamic standing balance task with reaching into all planes with B/L UEs for fnxl items  Patient continues to be functioning below baseline level, occupational performance remains limited secondary to factors listed above and increased risk for falls and injury  From OT standpoint, recommendation at time of d/c would be STR - pending progress  Patient to benefit from continued Occupational Therapy treatment while in the hospital to address deficits as defined above and maximize level of functional independence with ADLs and functional mobility  Pt was left after session with all current needs met  The patient's raw score on the AM-PAC Daily Activity Inpatient Short Form is 20  A raw score of greater than or equal to 19 suggests the patient may benefit from discharge to home  Please refer to the recommendation of the Occupational Therapist for safe discharge planning  Plan   Treatment Interventions ADL retraining;Functional transfer training;UE strengthening/ROM; Endurance training;Patient/family training;Equipment evaluation/education; Compensatory technique education;Continued evaluation; Energy conservation; Activityengagement   Goal Expiration Date 04/13/23   OT Treatment Day 2   OT Frequency 3-5x/wk   Recommendation   OT Discharge Recommendation Post acute rehabilitation services  (pending progress)   AM-PAC Daily Activity Inpatient   Lower Body Dressing 3   Bathing 3   Toileting 2   Upper Body Dressing 4   Grooming 4   Eating 4   Daily Activity Raw Score 20   Daily Activity Standardized Score (Calc for Raw Score >=11) 42 03   AM-PAC Applied Cognition Inpatient   Following a Speech/Presentation 4   Understanding Ordinary Conversation 4   Taking Medications 4   Remembering Where Things Are Placed or Put Away 4   Remembering List of 4-5 Errands 4   Taking Care of Complicated Tasks 4   Applied Cognition Raw Score 24   Applied Cognition Standardized Score 62 21     George Herrera MS, OTR/L

## 2023-04-06 LAB
GLUCOSE SERPL-MCNC: 125 MG/DL (ref 65–140)
GLUCOSE SERPL-MCNC: 141 MG/DL (ref 65–140)
GLUCOSE SERPL-MCNC: 143 MG/DL (ref 65–140)
GLUCOSE SERPL-MCNC: 158 MG/DL (ref 65–140)
GLUCOSE SERPL-MCNC: 194 MG/DL (ref 65–140)
IRON SATN MFR SERPL: 10 % (ref 15–50)
IRON SERPL-MCNC: 21 UG/DL (ref 50–170)
LACTATE SERPL-SCNC: 2 MMOL/L (ref 0.5–2)
PROCALCITONIN SERPL-MCNC: 0.06 NG/ML
TIBC SERPL-MCNC: 210 UG/DL (ref 250–450)

## 2023-04-06 NOTE — PROGRESS NOTES
[address] [facility]  Progress Note  Name: Myrtle Greenwood  MRN: [id number]  [facility] Date of Admission: 3/24/2023   Date of Service: 4/6/2023 I Hospital Day: 13    Assessment/Plan   * S/P BKA (below knee amputation) unilateral, left ([facility] )  Assessment & Plan  S/p left BKA with vascular surgery  Vascular surgery inputs noted  APS on board for pain management    PAD (peripheral artery disease) ([place] [address] )  Assessment & Plan  Hx of occluded bypass and profunda thrombus  Continue aspirin/statin  Continue xarelto, on 15mg bid x 21 days then 20mg qd  Vascular surgery inputs noted  S/p hematology consult complete thrombophilia evaluation as outpt  Deemed no need to obtain here as has been already on a/c likely to skew results    Leukocytosis  Assessment & Plan  Likely reactive, trended down  No focal source of infection    Anemia  Assessment & Plan  Stable  Cont to monitor    Acute respiratory distress  Assessment & Plan  Acute respiratory distress present on admission  Postoperatively patient was noted to have bronchospasm requiring reintubation admission to the ICU  Hx of asthma   S/p completion of steroid taper  Extubated successfully  Respiratory distress resolved; on RA  Encourage incentive spirometry  Cont pulm inhalers    BMI 45 0-49 9, adult [facility])  Assessment & Plan  Therapeutic lifestyle modification encouraged  Outpatient sleep study recommended    Bipolar disorder ([place] [address] )  Assessment & Plan  Continue Seroquel, topiramate  S/p psych consult, cleared for snf discharge    Nicotine dependence  Assessment & Plan  Smoking cessation discussed and encouraged    Type 2 diabetes mellitus with diabetic polyneuropathy, with long-term current use of insulin [facility])  Assessment & Plan  Lab Results   Component Value Date    HGBA1C 9 8 (A) 12/20/2022       Recent Labs     04/05/23  1712 04/05/23  2041 04/06/23  0720 04/06/23  1122   POCGLU 222* 155* 194* 141*       Blood Sugar Average: Last 72 hrs:  (P) 521 [id number]     Diabetes mellitus type 2 uncontrolled  Endocrinology following  Cont insulin regimen as per their recommendations  Hypoglycemia protocol in place      Essential hypertension  Assessment & Plan  Stable         VTE Pharmacologic Prophylaxis:   Pharmacologic: Rivaroxaban (Xarelto)  Mechanical VTE Prophylaxis in Place: No    Patient Centered Rounds: I have performed bedside rounds with nursing staff today  Discussions with Specialists or Other Care Team Provider:     Education and Discussions with Family / Patient: Patient and her significant other    Time Spent for Care: 30 minutes  More than 50% of total time spent on counseling and coordination of care as described above  Current Length of Stay: 13 day(s)    Current Patient Status: Inpatient   Certification Statement: The patient will continue to require additional inpatient hospital stay due to Pending placement    Discharge Plan: SNF    Code Status: Level 1 - Full Code      Subjective:   No acute events overnight  Not in painful distress  Objective:     Vitals:   Temp (24hrs), Av 3 °F (36 3 °C), Min:97 3 °F (36 3 °C), Max:97 3 °F (36 3 °C)    Temp:  [97 3 °F (36 3 °C)] 97 3 °F (36 3 °C)  HR:  [102] 102  Resp:  [16] 16  BP: (149-169)/() 149/92  SpO2:  [96 %] 96 %  Body mass index is 52 07 kg/m²  Input and Output Summary (last 24 hours): Intake/Output Summary (Last 24 hours) at 2023 1304  Last data filed at 2023 8674  Gross per 24 hour   Intake 320 ml   Output --   Net 320 ml       Physical Exam:     Physical Exam  Constitutional:       Appearance: She is obese  Cardiovascular:      Rate and Rhythm: Normal rate and regular rhythm  Pulses: Normal pulses  Heart sounds: Normal heart sounds  Pulmonary:      Effort: Pulmonary effort is normal  No respiratory distress  Breath sounds: Normal breath sounds  No wheezing or rales  Abdominal:      General: Abdomen is flat   Bowel sounds are normal  There is no distension  Palpations: Abdomen is soft  Tenderness: There is no abdominal tenderness  Musculoskeletal:      Cervical back: Normal range of motion and neck supple  Right lower leg: No edema  Left lower leg: No edema  Comments: L BKA   Skin:     General: Skin is warm and dry  Neurological:      General: No focal deficit present  Mental Status: She is alert and oriented to person, place, and time  Mental status is at baseline  Cranial Nerves: No cranial nerve deficit  Additional Data:     Labs:    Results from last 7 days   Lab Units 04/05/23  0742   WBC Thousand/uL 14 52*   HEMOGLOBIN g/dL 8 7*   HEMATOCRIT % 25 4*   PLATELETS Thousands/uL 391*     Results from last 7 days   Lab Units 04/05/23  0742   SODIUM mmol/L 136   POTASSIUM mmol/L 3 9   CHLORIDE mmol/L 107   CO2 mmol/L 26   BUN mg/dL 13   CREATININE mg/dL 0 47*   ANION GAP mmol/L 3*   CALCIUM mg/dL 8 4   GLUCOSE RANDOM mg/dL 225*         Results from last 7 days   Lab Units 04/06/23  1122 04/06/23  0720 04/05/23  2041 04/05/23  1712 04/05/23  1115 04/05/23  0834 04/04/23  2200 04/04/23  1558 04/04/23  1139 04/04/23  0738 04/03/23  2107 04/03/23  1610   POC GLUCOSE mg/dl 141* 194* 155* 222* 165* 210* 226* 240* 230* 173* 275* 277*         Results from last 7 days   Lab Units 04/06/23  0234 04/06/23  0201   LACTIC ACID mmol/L 2 0  --    PROCALCITONIN ng/ml  --  0 06           * I Have Reviewed All Lab Data Listed Above    * Additional Pertinent Lab Tests Reviewed: No New Labs Available For Today    Imaging:    Imaging Reports Reviewed Today Include:   Imaging Personally Reviewed by Myself Includes:      Recent Cultures (last 7 days):           Last 24 Hours Medication List:   Current Facility-Administered Medications   Medication Dose Route Frequency Provider Last Rate   • acetaminophen  975 mg Oral Q8H Berkley Lovelace MD     • albuterol  2 puff Inhalation Q4H PRN Susu Morales MD     • albuterol  2 5 mg Nebulization Q4H PRN Mu Hurtado DO     • aspirin  81 mg Oral Daily Susu Morales MD     • atorvastatin  40 mg Oral Daily With Sekou Be MD     • bisacodyl  10 mg Rectal Daily PRN Susu Morales MD     • fluticasone-vilanterol  1 puff Inhalation Daily Susu Morales MD     • gabapentin  300 mg Oral BID Susu Morales MD     • hydrALAZINE  10 mg Intravenous Q6H PRN Susu Morales MD     • hydrALAZINE  25 mg Oral Q8H [address] Susu Morales MD     • HYDROmorphone  1 mg Intravenous Q4H PRN Susu Morales MD     • insulin glargine  40 Units Subcutaneous HS Susu Morales MD     • insulin lispro  18 Units Subcutaneous TID With Meals Emma Donaldson DO     • insulin lispro  2-12 Units Subcutaneous TID AC Susu Morales MD     • insulin lispro  2-12 Units Subcutaneous HS Susu Morales MD     • melatonin  6 mg Oral HS Susu Morales MD     • methocarbamol  500 mg Oral Q6H [address] Susu Morales MD     • naloxone  0 04 mg Intravenous Q1MIN PRN Susu Morales MD     • nicotine  21 mg Transdermal Daily Susu Morales MD     • oxyCODONE  10 mg Oral Q4H PRN Susu Morales MD     • oxyCODONE  5 mg Oral Q4H PRN Susu Morales MD     • polyethylene glycol  17 g Oral Daily Susu Morales MD     • prazosin  1 mg Oral HS Susu Morales MD     • QUEtiapine  200 mg Oral HS Susu Morales MD     • rivaroxaban  15 mg Oral BID With Meals Karri Espinal, DO     • [START ON 4/21/2023] rivaroxaban  20 mg Oral Daily With Breakfast Karri Espinal DO     • senna-docusate sodium  2 tablet Oral BID Susu Morales MD     • simethicone  80 mg Oral Q6H PRN Susu Morales MD     • topiramate  25 mg Oral BID Susu Morales MD          Today, Patient Was Seen By: Parris Grimm Trisha Arevalo, DO    ** Please Note: Dictation voice to text software may have been used in the creation of this document   **

## 2023-04-06 NOTE — PROGRESS NOTES
L AKTAMMY Stump Check:    POD8 from L AKA  Dressing was taken down  Incision was inspected  Staples and sutures were noted to be intact  No erythema, induration, fluctuance or drainage noted  Incision was painted with betadine and covered with two ABDs  The stump was then wrapped with two ace wraps      Vascular will continue to perform intermittent wound checks while the patient remains in the hospital

## 2023-04-06 NOTE — ASSESSMENT & PLAN NOTE
Lab Results   Component Value Date    HGBA1C 9 8 (A) 12/20/2022       Recent Labs     04/05/23  1712 04/05/23  2041 04/06/23  0720 04/06/23  1122   POCGLU 222* 155* 194* 141*       Blood Sugar Average: Last 72 hrs:  (P) 062 [id number]     Diabetes mellitus type 2 uncontrolled  Endocrinology following   Cont insulin regimen as per their recommendations  Hypoglycemia protocol in place

## 2023-04-07 PROBLEM — R82.81 PYURIA: Status: ACTIVE | Noted: 2023-04-07

## 2023-04-07 LAB
BACTERIA UR QL AUTO: ABNORMAL /HPF
BILIRUB UR QL STRIP: NEGATIVE
CLARITY UR: ABNORMAL
COLOR UR: ABNORMAL
GLUCOSE SERPL-MCNC: 189 MG/DL (ref 65–140)
GLUCOSE SERPL-MCNC: 223 MG/DL (ref 65–140)
GLUCOSE SERPL-MCNC: 276 MG/DL (ref 65–140)
GLUCOSE SERPL-MCNC: 289 MG/DL (ref 65–140)
GLUCOSE SERPL-MCNC: 98 MG/DL (ref 65–140)
GLUCOSE UR STRIP-MCNC: ABNORMAL MG/DL
HGB UR QL STRIP.AUTO: ABNORMAL
KETONES UR STRIP-MCNC: NEGATIVE MG/DL
LEUKOCYTE ESTERASE UR QL STRIP: ABNORMAL
NITRITE UR QL STRIP: NEGATIVE
NON-SQ EPI CELLS URNS QL MICRO: ABNORMAL /HPF
PH UR STRIP.AUTO: 7 [PH]
PROT UR STRIP-MCNC: ABNORMAL MG/DL
RBC #/AREA URNS AUTO: ABNORMAL /HPF
SP GR UR STRIP.AUTO: 1.03 (ref 1–1.03)
UROBILINOGEN UR STRIP-ACNC: <2 MG/DL
WBC #/AREA URNS AUTO: ABNORMAL /HPF

## 2023-04-07 NOTE — CASE MANAGEMENT
Case Management Discharge Planning Note    Patient name Kaz Mishra  [facility] [id number]  : 1985 Date 2023       Current Admission Date: 3/24/2023  Current Admission Diagnosis:S/P BKA (below knee amputation) unilateral, left [facility])   Patient Active Problem List    Diagnosis Date Noted   • Pyuria 2023   • Anemia 2023   • Leukocytosis 2023   • Acute respiratory distress 2023   • Class 3 severe obesity due to excess calories without serious comorbidity with body mass index (BMI) of 40 0 to 44 9 in adult [facility]) 2022   • Surgical wound breakdown, sequela 10/28/2021   • Positive D dimer 10/02/2021   • Atherosclerosis of left leg ([facility] ) 2021   • S/P BKA (below knee amputation) unilateral, left ([place] [address] ) 2021   • Bursitis of left knee 2021   • BMI 45 0-49 9, adult ([place] [address] ) 2021   • PAD (peripheral artery disease) ([place] [address] ) 2021   • Diarrhea 2021   • Bilateral leg pain 2021   • Leg pain 2020   • Headache 2020   • Essential hypertension 2020   • Type 2 diabetes mellitus with diabetic polyneuropathy, with long-term current use of insulin ([place] [address] ) 2020   • Paresthesia 2020   • COPD (chronic obstructive pulmonary disease) ([place] [address] ) 2020   • Nicotine dependence 2020   • Bipolar disorder ([place] [address] ) 2020      LOS (days): 14  Geometric Mean LOS (GMLOS) (days): 4 30  Days to GMLOS:-9 8     OBJECTIVE:  Risk of Unplanned Readmission Score: 22 33         Current admission status: Inpatient   Preferred Pharmacy:   [address] [place] [id number]  Phone: [phone] Fax: [phone]    [facility] [place] [facility], [address] [place] [address]  [address] [place] [address] [address]  Phone: [phone] Fax: [phone]    [address] [place] [facility] [place]  [place] [address]  Phone: [phone] Fax: [phone]    Primary Care Provider: Hanh Nathan MD    Primary Insurance: Jade Friendly [facility] REP  Secondary Insurance: [address] [place] [address] [facility],Third Floor DETAILS:               Other Referral/Resources/Interventions Provided:  Referral Comments: AIDIN message from Rosalina at [address] informed pt was denied for [employer] earlier in the week & she cannot change her aidin status in referrla  Pt remains a denial  Expanded list earlier today & just now

## 2023-04-07 NOTE — PHYSICAL THERAPY NOTE
PHYSICAL THERAPY NOTE    Patient Name: Josh Lynn  XDSHV'C Date: 4/7/2023 04/07/23 1443   PT Last Visit   PT Visit Date 04/07/23   Note Type   Note Type Treatment   Pain Assessment   Pain Assessment Tool 0-10   Pain Score No Pain   Restrictions/Precautions   Weight Bearing Precautions Per Order Yes   LLE Weight Bearing Per Order NWB  (L AKA)   Other Precautions Fall Risk;Pain;WBS   General   Chart Reviewed Yes   Response to Previous Treatment Patient with no complaints from previous session  Family/Caregiver Present No   Cognition   Overall Cognitive Status WFL   Arousal/Participation Alert; Responsive; Cooperative   Attention Within functional limits   Orientation Level Oriented X4   Memory Within functional limits   Following Commands Follows one step commands without difficulty   Comments Pt pleasant and cooperative t/o PT session   Bed Mobility   Rolling R Unable to assess   Rolling L Unable to assess   Supine to Sit Unable to assess   Sit to Supine Unable to assess   Additional Comments Upon arrival, pt seated in recliner chair  Pt seated edge of bed with call bell and all needs within reach following PT session  Transfers   Sit to Stand 5  Supervision   Additional items Increased time required;Armrests   Stand to Sit 5  Supervision   Additional items Increased time required;Armrests   Stand pivot 4  Minimal assistance  (CGA)   Additional items Assist x 1; Increased time required;Trapeze bar   Additional Comments Pt used RW for all transfers  Pt completed 5 STSs t/o PT session   Ambulation/Elevation   Gait pattern Decreased foot clearance; Inconsistent juanita; Forward Flexion  (Used hopping technique- heavy reliance on UE support)   Gait Assistance 4  Minimal assist  (CGA)   Additional items Assist x 1;Verbal cues   Assistive Device Rolling walker   Distance 3'   Ambulation/Elevation Additional Comments Ambulation limited by LLE NWB and fatigue   Balance   Static Sitting Fair +   Dynamic Sitting Fair   Static Standing Fair -   Dynamic Standing Fair -   Ambulatory Poor +   Endurance Deficit   Endurance Deficit Yes   Endurance Deficit Description LE weakness, pain   Activity Tolerance   Activity Tolerance Patient limited by fatigue;Patient limited by pain   Nurse Made Aware RN cleared pt for PT session   Exercises   Balance training  balance challenged in sitting unsupported x6 min and standing with unilateral UE support for 2 min x3 trials  Assessment   Prognosis Good   Problem List Decreased strength;Decreased range of motion;Decreased endurance; Impaired balance;Decreased mobility; Decreased safety awareness;Pain;Orthopedic restrictions   Assessment Pt seen for PT treatment session this date  Therapy session focused on dynamic sitting balance, standing balance and tolerance, transfer training and gait training in order to improve overall mobility and independence  Pt required S for sitting balance activities with back unsupported including reaching outside ELENA and weight shifting in chair  Pt completed 5 STS t/o PT session with S and VC for glute contraction to maximize upright positioning  Pt completed standing balance activities with unilateral UE support at RW including perturbations, reaching outside ELENA and hip flexion of LLE  Pt completed 3 trials of 2 min stands  No LOB throughout balance training  Pt completed ambulation with RW and min A using hopping technique for 3' from chair to bed with heavy reliance on UE for support but no LOB and good safety awareness t/o  Pt making good progress toward goals  Pt was left sitting EOB with call bell at the end of PT session with all needs in reach  Pt would benefit from continued PT services while in hospital to address remaining limitations  PT to continue treating pt and recommends post-acute rehab services vs home with HHPT pending progress with WC mobility and transfers   The patient's -PAC Basic Mobility Inpatient Short Form Raw Score is 14  A Raw score of less than or equal to 16 suggests the patient may benefit from discharge to post-acute rehabilitation services  Please also refer to the recommendation of the Physical Therapist for safe discharge planning  Barriers to Discharge Decreased caregiver support; Inaccessible home environment   Goals   Patient Goals to get washed up   STG Expiration Date 04/21/23   PT Treatment Day 2   Plan   Treatment/Interventions Functional transfer training;LE strengthening/ROM; Therapeutic exercise; Endurance training;Patient/family training;Equipment eval/education; Bed mobility;Gait training;Spoke to nursing   Progress Progressing toward goals   PT Frequency 3-5x/wk   Recommendation   PT Discharge Recommendation Post acute rehabilitation services  (vs home with HHPT pending progress with WC mobility and transfers)   Equipment Recommended Wheelchair;Walker   Wheelchair Package Recommended Standard   Change or Add item to Wheelchair Package? Yes, Add Item; Yes, Change Item   What would you like to ADD? Transfer Board- 30 inch   What would you like to CHANGE? Wider Seat Width;Different Leg Rest Type (Elevating Leg Rests); Different Seat Cushion (Skin Protection)   W/C Seat Width 30 inch   Walker Package Recommended Wheeled walker   [facility] Basic Mobility Inpatient   Turning in Flat Bed Without Bedrails 3   Lying on Back to Sitting on Edge of Flat Bed Without Bedrails 3   Moving Bed to Chair 3   Standing Up From Chair Using Arms 3   Walk in Room 1   Climb 3-5 Stairs With Railing 1   Basic Mobility Inpatient Raw Score 14   Basic Mobility Standardized Score 35 55   Highest Level Of Mobility   JH-HLM Goal 4: Move to chair/commode   JH-HLM Achieved 5: Stand (1 or more minutes)   Education   Education Provided Mobility training;Assistive device   End of Consult   Patient Position at End of Consult Seated edge of bed; All needs within reach     Jeniffer Schumacherutter SPT  3:38 PM  04/07/23

## 2023-04-07 NOTE — CASE MANAGEMENT
Case Management Discharge Planning Note    Patient name Dandre Díaz  Location [facility] 9[address]/[facility] 816-02 MRN [id number]  : 1985 Date 2023       Current Admission Date: 3/24/2023  Current Admission Diagnosis:S/P BKA (below knee amputation) unilateral, left [facility])   Patient Active Problem List    Diagnosis Date Noted   • Anemia 2023   • Leukocytosis 2023   • Acute respiratory distress 2023   • Class 3 severe obesity due to excess calories without serious comorbidity with body mass index (BMI) of 40 0 to 44 9 in adult [facility]) 2022   • Surgical wound breakdown, sequela 10/28/2021   • Positive D dimer 10/02/2021   • Atherosclerosis of left leg ([facility] ) 2021   • S/P BKA (below knee amputation) unilateral, left ([place] [address] ) 2021   • Bursitis of left knee 2021   • BMI 45 0-49 9, adult ([place] [address] ) 2021   • PAD (peripheral artery disease) ([place] [address] ) 2021   • Diarrhea 2021   • Bilateral leg pain 2021   • Leg pain 2020   • Headache 2020   • Essential hypertension 2020   • Type 2 diabetes mellitus with diabetic polyneuropathy, with long-term current use of insulin ([place] [address] ) 2020   • Paresthesia 2020   • COPD (chronic obstructive pulmonary disease) ([place] [address] ) 2020   • Nicotine dependence 2020   • Bipolar disorder ([place] [address] ) 2020      LOS (days): 14  Geometric Mean LOS (GMLOS) (days): 4 30  Days to GMLOS:-9 6     OBJECTIVE:  Risk of Unplanned Readmission Score: 22 28         Current admission status: Inpatient   Preferred Pharmacy:   [address] [place] [address]  [place] [address]  Phone: [phone] Fax: [phone]    [facility] ([place]) - [address] [place] [address]  [address] [id number]  Phone: [phone] Fax: [phone] [address] [place], [facility] [address] [facility]  Phone: [phone] Fax: [phone]    Primary Care Provider: Tomasz Ogden MD    Primary Insurance: [facility] REP  Secondary Insurance: [facility] [address] DETAILS:            Other Referral/Resources/Interventions Provided:  Referral Comments: Options papers received & uploaded into [address]  Pt is nursing facility cilnically eligible  S/w pt & gave her list of accepting facilities  She wants to s/w her s o  to pick which one, either [facility] or [facility]  AIDIN message sent to [facility] to see if they have beds available  [phone] message from Rosalina at [address] [address] pt was denied  Updated pt  Expanded bed search

## 2023-04-07 NOTE — PLAN OF CARE
Problem: PHYSICAL THERAPY ADULT  Goal: Performs mobility at highest level of function for planned discharge setting  See evaluation for individualized goals  Description: Treatment/Interventions: OT, Spoke to case management, Spoke to nursing, Gait training, Bed mobility, Patient/family training, Endurance training, Functional transfer training, LE strengthening/ROM  Equipment Recommended: Wheelchair, Laura Phy       See flowsheet documentation for full assessment, interventions and recommendations  Outcome: Progressing  Note: Prognosis: Good  Problem List: Decreased strength, Decreased range of motion, Decreased endurance, Impaired balance, Decreased mobility, Decreased safety awareness, Pain, Orthopedic restrictions  Assessment: Pt seen for PT treatment session this date  Therapy session focused on dynamic sitting balance, standing balance and tolerance, transfer training and gait training in order to improve overall mobility and independence  Pt required S for sitting balance activities with back unsupported including reaching outside ELENA and weight shifting in chair  Pt completed 5 STS t/o PT session with S and VC for glute contraction to maximize upright positioning  Pt completed standing balance activities with unilateral UE support at RW including perturbations, reaching outside ELENA and hip flexion of LLE  Pt completed 3 trials of 2 min stands  No LOB throughout balance training  Pt completed ambulation with RW and CGA using hopping technique for 3' from chair to bed with heavy reliance on UE for support but no LOB and good safety awareness t/o  Pt making good progress toward goals  Pt was left sitting EOB with call bell at the end of PT session with all needs in reach  Pt would benefit from continued PT services while in hospital to address remaining limitations  PT to continue treating pt and recommends post-acute rehab services vs home with HHPT pending progress with WC mobility and transfers   The patient's AM-PAC Basic Mobility Inpatient Short Form Raw Score is 14  A Raw score of less than or equal to 16 suggests the patient may benefit from discharge to post-acute rehabilitation services  Please also refer to the recommendation of the Physical Therapist for safe discharge planning  Barriers to Discharge: Decreased caregiver support, Inaccessible home environment     PT Discharge Recommendation: Post acute rehabilitation services (vs home with HHPT pending progress with WC mobility and transfers)    See flowsheet documentation for full assessment

## 2023-04-07 NOTE — ASSESSMENT & PLAN NOTE
Pt reports was having vaginal itching but denies dysuria, ur frequency/urgency  Pt is afebrile, non toxic appearing  F/u urine cx

## 2023-04-07 NOTE — PROGRESS NOTES
[facility]  Progress Note  Name: Hema Fuentes  MRN: [id number]  Unit/Bed#: [facility] 904-01 I Date of Admission: 3/24/2023   Date of Service: 4/7/2023 I Hospital Day: 14    Assessment/Plan   * S/P BKA (below knee amputation) unilateral, left ([facility] )  Assessment & Plan  S/p left BKA with vascular surgery  Vascular surgery inputs noted  S/P APS consult for pain regimen    PAD (peripheral artery disease) ([place] [address] )  Assessment & Plan  Hx of occluded bypass and profunda thrombus  Continue aspirin/statin  Continue xarelto, on 15mg bid x 21 days then 20mg qd  Vascular surgery inputs noted  S/p hematology consult complete thrombophilia evaluation as outpt  Deemed no need to obtain here as has been already on a/c likely to skew results    Pyuria  Assessment & Plan  Pt reports was having vaginal itching but denies dysuria, ur frequency/urgency  Pt is afebrile, non toxic appearing  F/u urine cx    Leukocytosis  Assessment & Plan  Likely reactive, trended down  No focal source of infection    Anemia  Assessment & Plan  Stable  Cont to monitor    Acute respiratory distress  Assessment & Plan  Acute respiratory distress present on admission  Postoperatively patient was noted to have bronchospasm requiring reintubation admission to the ICU  Hx of asthma   S/p completion of steroid taper  Extubated successfully  Respiratory distress resolved; on RA  Encourage incentive spirometry  Cont pulm inhalers    BMI 45 0-49 9, adult [facility])  Assessment & Plan  Therapeutic lifestyle modification encouraged      Bipolar disorder ([place] [address] )  Assessment & Plan  Continue Seroquel, topiramate  S/p psych consult, cleared for snf discharge    Nicotine dependence  Assessment & Plan  Smoking cessation discussed and encouraged    Type 2 diabetes mellitus with diabetic polyneuropathy, with long-term current use of insulin [facility])  Assessment & Plan  Lab Results   Component Value Date    HGBA1C 9 8 (A) 12/20/2022       Recent Labs     23  1736 23  2051 23  0744 23  1144   POCGLU 143* 158* 223* 98       Blood Sugar Average: Last 72 hrs:  (P) 180 2     Diabetes mellitus type 2 uncontrolled  Endocrinology following  Cont insulin regimen as per their recommendations  Poor diet compliance  Hypoglycemia protocol in place      Essential hypertension  Assessment & Plan  Stable         VTE Pharmacologic Prophylaxis:   Pharmacologic: Rivaroxaban (Xarelto)  Mechanical VTE Prophylaxis in Place: No    Patient Centered Rounds: I have performed bedside rounds with nursing staff today  Discussions with Specialists or Other Care Team Provider:     Education and Discussions with Family / Patient: Patient and her significant other    Time Spent for Care: 30 minutes  More than 50% of total time spent on counseling and coordination of care as described above  Current Length of Stay: 14 day(s)    Current Patient Status: Inpatient   Certification Statement: The patient will continue to require additional inpatient hospital stay due to pending placement    Discharge Plan:     Code Status: Level 1 - Full Code      Subjective:   Pt seen and examined at bedside  Had c/o outer vaginal itching thus UA was obtained  But she denies dysuria, urinary frequency or urgency  Afebrile, non toxic appearing  Objective:     Vitals:   Temp (24hrs), Av 5 °F (36 4 °C), Min:97 3 °F (36 3 °C), Max:97 8 °F (36 6 °C)    Temp:  [97 3 °F (36 3 °C)-97 8 °F (36 6 °C)] 97 5 °F (36 4 °C)  HR:  [107-115] 107  Resp:  [18] 18  BP: (110-146)/() 130/90  SpO2:  [95 %-99 %] 97 %  Body mass index is 52 07 kg/m²  Input and Output Summary (last 24 hours): Intake/Output Summary (Last 24 hours) at 2023 1333  Last data filed at 2023 1300  Gross per 24 hour   Intake 400 ml   Output 250 ml   Net 150 ml       Physical Exam:     Physical Exam  Constitutional:       Appearance: She is obese     Cardiovascular:      Rate and Rhythm: Normal rate and regular rhythm  Pulses: Normal pulses  Heart sounds: Normal heart sounds  No murmur heard  Pulmonary:      Effort: Pulmonary effort is normal  No respiratory distress  Breath sounds: Normal breath sounds  No wheezing or rales  Abdominal:      General: Abdomen is flat  Bowel sounds are normal  There is no distension  Palpations: Abdomen is soft  Tenderness: There is no abdominal tenderness  There is no guarding  Musculoskeletal:         General: Normal range of motion  Cervical back: Normal range of motion and neck supple  Comments: L BKA   Skin:     General: Skin is warm and dry  Neurological:      General: No focal deficit present  Mental Status: She is alert and oriented to person, place, and time  Mental status is at baseline  Cranial Nerves: No cranial nerve deficit  Motor: No weakness  Additional Data:     Labs:    Results from last 7 days   Lab Units 04/05/23  0742   WBC Thousand/uL 14 52*   HEMOGLOBIN g/dL 8 7*   HEMATOCRIT % 25 4*   PLATELETS Thousands/uL 391*     Results from last 7 days   Lab Units 04/05/23  0742   SODIUM mmol/L 136   POTASSIUM mmol/L 3 9   CHLORIDE mmol/L 107   CO2 mmol/L 26   BUN mg/dL 13   CREATININE mg/dL 0 47*   ANION GAP mmol/L 3*   CALCIUM mg/dL 8 4   GLUCOSE RANDOM mg/dL 225*         Results from last 7 days   Lab Units 04/07/23  1144 04/07/23  0744 04/06/23  2051 04/06/23  1736 04/06/23  1637 04/06/23  1122 04/06/23  0720 04/05/23  2041 04/05/23  1712 04/05/23  1115 04/05/23  0834 04/04/23  2200   POC GLUCOSE mg/dl 98 223* 158* 143* 125 141* 194* 155* 222* 165* 210* 226*         Results from last 7 days   Lab Units 04/06/23  0234 04/06/23  0201   LACTIC ACID mmol/L 2 0  --    PROCALCITONIN ng/ml  --  0 06           * I Have Reviewed All Lab Data Listed Above    * Additional Pertinent Lab Tests Reviewed: No New Labs Available For Today    Imaging:    Imaging Reports Reviewed Today Include:   Imaging Personally Reviewed by Myself Includes:     Recent Cultures (last 7 days):           Last 24 Hours Medication List:   Current Facility-Administered Medications   Medication Dose Route Frequency Provider Last Rate   • acetaminophen  975 mg Oral Q8H [address] Dianne Mckeon MD     • albuterol  2 puff Inhalation Q4H PRN Dianne Mckeon MD     • aspirin  81 mg Oral Daily Dianne Mckeon MD     • atorvastatin  40 mg Oral Daily With Tommy Gonzalez MD     • bisacodyl  10 mg Rectal Daily PRN Dianne Mckeon MD     • fluticasone-vilanterol  1 puff Inhalation Daily Dianne Mckeon MD     • gabapentin  300 mg Oral BID Dianne Mckeon MD     • hydrALAZINE  10 mg Intravenous Q6H PRN Dianne Mckeon MD     • hydrALAZINE  25 mg Oral Q8H [address] Dianne Mckeon MD     • HYDROmorphone  1 mg Intravenous Q4H PRN Dianne Mckeon MD     • insulin glargine  40 Units Subcutaneous HS Dianne Mckeon MD     • insulin lispro  18 Units Subcutaneous TID With Meals Emma Donaldson DO     • insulin lispro  2-12 Units Subcutaneous TID AC Dianne Mckeno MD     • insulin lispro  2-12 Units Subcutaneous HS Dianne Mckeon MD     • melatonin  6 mg Oral HS Dianne Mckeon MD     • methocarbamol  500 mg Oral Q6H [address] Dianne Mckeon MD     • naloxone  0 04 mg Intravenous Q1MIN PRN Dianne Mckeon MD     • nicotine  21 mg Transdermal Daily Dianne Mckeon MD     • oxyCODONE  10 mg Oral Q4H PRN Dianne Mckeon MD     • oxyCODONE  5 mg Oral Q4H PRN Dianne Mckeon MD     • polyethylene glycol  17 g Oral Daily Dianne Mckeon MD     • prazosin  1 mg Oral HS Dianne Mckeon MD     • QUEtiapine  200 mg Oral HS Dianne Mckeon MD     • rivaroxaban  15 mg Oral BID With Meals Garret Harris DO     • [START ON 4/21/2023] rivaroxaban  20 mg Oral Daily With Breakfast Garret Harris      • senna-docusate sodium  2 tablet Oral BID Ortega Dia MD     • simethicone  80 mg Oral Q6H PRN Ortega Dia MD     • topiramate  25 mg Oral BID Ortega Dia MD     • white petrolatum-mineral oil   Topical TID PRN Crow Meyer DO          Today, Patient Was Seen By: Crow Meyer DO    ** Please Note: Dictation voice to text software may have been used in the creation of this document   **

## 2023-04-07 NOTE — ASSESSMENT & PLAN NOTE
Lab Results   Component Value Date    HGBA1C 9 8 (A) 12/20/2022       Recent Labs     04/06/23  1736 04/06/23 2051 04/07/23  0744 04/07/23  1144   POCGLU 143* 158* 223* 98       Blood Sugar Average: Last 72 hrs:  (P) 180 2     Diabetes mellitus type 2 uncontrolled  Endocrinology following   Cont insulin regimen as per their recommendations  Poor diet compliance  Hypoglycemia protocol in place

## 2023-04-08 LAB
GLUCOSE SERPL-MCNC: 106 MG/DL (ref 65–140)
GLUCOSE SERPL-MCNC: 151 MG/DL (ref 65–140)
GLUCOSE SERPL-MCNC: 216 MG/DL (ref 65–140)
GLUCOSE SERPL-MCNC: 217 MG/DL (ref 65–140)

## 2023-04-08 NOTE — PROGRESS NOTES
"  Progress Note - Alonso Segovia 40 y o  female MRN: [id number]    Unit/Bed#: [facility] 904-01 Encounter: [id number]      CC: diabetes f/u    Subjective:   Alonso Segovia is a 40y o  year old female with type 2DM s/p left BKA  Objective:     Vitals: Blood pressure 127/88, pulse 101, temperature 97 7 °F (36 5 °C), temperature source Temporal, resp  rate 16, height 5' 1\" (1 549 m), weight 125 kg (275 lb 9 2 oz), SpO2 98 %, not currently breastfeeding  ,Body mass index is 52 07 kg/m²  Intake/Output Summary (Last 24 hours) at 4/8/2023 1412  Last data filed at 4/8/2023 8930  Gross per 24 hour   Intake 480 ml   Output 800 ml   Net -320 ml       Physical Exam:  General Appearance: awake, appears stated age and cooperative  Head: Normocephalic, without obvious abnormality, atraumatic  Extremities: Left BKA  Skin: Skin color and temperature normal    Pulm: no labored breathing    Lab, Imaging and other studies: I have personally reviewed pertinent reports  POC Glucose (mg/dl)   Date Value   04/08/2023 106   04/08/2023 216 (H)   04/07/2023 289 (H)   04/07/2023 276 (H)   04/07/2023 189 (H)   04/07/2023 98   04/07/2023 223 (H)   04/06/2023 158 (H)   04/06/2023 143 (H)   04/06/2023 125       Assessment and plan:    #T2DM    A1c 9 8%  Home regimen metformin 1000 mg twice daily, Lantus 20 units twice daily, lispro 21 units 3 times daily with meals, Trulicity 1 5 mg weekly  Inpatient regimen Lantus 40 units daily at bedtime, Humalog 18 units 3 times daily with meals with correctional insulin coverage    Recommendations: Increase Lantus to 44 units daily at bedtime, continue Humalog 18 units with breakfast, increase Humalog to 20 units with lunch and 22 units with dinner  Monitor Accu-Cheks and adjust insulin regimen as needed  Portions of the record may have been created with voice recognition software     "

## 2023-04-08 NOTE — PROGRESS NOTES
[address] [facility]  Progress Note  Name: Myrtle Greenwood  MRN: [id number]  [facility] I Date of Admission: 3/24/2023   Date of Service: 4/8/2023 I Hospital Day: 15    Assessment/Plan   * S/P BKA (below knee amputation) unilateral, left ([facility] 75 )  Assessment & Plan  S/p left BKA with vascular surgery  Vascular surgery inputs noted  S/P APS consult for pain regimen    PAD (peripheral artery disease) ([place] [id number] )  Assessment & Plan  Hx of occluded bypass and profunda thrombus  Continue aspirin/statin  Continue xarelto, on 15mg bid x 21 days then 20mg qd  Vascular surgery inputs noted  S/p hematology consult complete thrombophilia evaluation as outpt  Deemed no need to obtain here as has been already on a/c likely to skew results    Pyuria  Assessment & Plan  Pt is afebrile, non toxic appearing  S/p urine cx > 100 Kproteus f/u sensitivities  Start on IV ctx    Leukocytosis  Assessment & Plan  Secondary to acute cystitis  Cont to trend    Anemia  Assessment & Plan  Stable  Cont to monitor    Acute respiratory distress  Assessment & Plan  Acute respiratory distress present on admission  Postoperatively patient was noted to have bronchospasm requiring reintubation admission to the ICU  Hx of asthma   S/p completion of steroid taper  Extubated successfully  Respiratory distress resolved; on RA  Encourage incentive spirometry  Cont pulm inhalers    BMI 45 0-49 9, adult [facility])  Assessment & Plan  Therapeutic lifestyle modification encouraged      Bipolar disorder ([place] [address] )  Assessment & Plan  Continue Seroquel, topiramate  S/p psych consult, cleared for snf discharge    Nicotine dependence  Assessment & Plan  Smoking cessation discussed and encouraged    Type 2 diabetes mellitus with diabetic polyneuropathy, with long-term current use of insulin [facility])  Assessment & Plan  Lab Results   Component Value Date    HGBA1C 9 8 (A) 12/20/2022       Recent Labs     04/07/23 2056 04/07/23 2132 23  0738 23  1108   POCGLU 276* 289* 216* 106       Blood Sugar Average: Last 72 hrs:  (P) 181 875     Diabetes mellitus type 2 uncontrolled  Endocrinology following  Cont insulin regimen as per their recommendations  Poor diet compliance  Hypoglycemia protocol in place      Essential hypertension  Assessment & Plan  Stable        VTE Pharmacologic Prophylaxis:   Pharmacologic: Rivaroxaban (Xarelto)  Mechanical VTE Prophylaxis in Place: No    Patient Centered Rounds: I have performed bedside rounds with nursing staff today  Discussions with Specialists or Other Care Team Provider:     Education and Discussions with Family / Patient: Patient and her significant other    Time Spent for Care: 30 minutes  More than 50% of total time spent on counseling and coordination of care as described above  Current Length of Stay: 15 day(s)    Current Patient Status: Inpatient   Certification Statement: The patient will continue to require additional inpatient hospital stay due to Pending placement    Discharge Plan: SNF    Code Status: Level 1 - Full Code      Subjective:   Patient seen and examined at bedside  No acute events overnight  Nontoxic-appearing  Objective:     Vitals:   Temp (24hrs), Av 7 °F (36 5 °C), Min:97 7 °F (36 5 °C), Max:97 7 °F (36 5 °C)    Temp:  [97 7 °F (36 5 °C)] 97 7 °F (36 5 °C)  HR:  [101] 101  Resp:  [16] 16  BP: (127)/(88) 127/88  SpO2:  [98 %] 98 %  Body mass index is 52 07 kg/m²  Input and Output Summary (last 24 hours): Intake/Output Summary (Last 24 hours) at 2023 1508  Last data filed at 2023 7678  Gross per 24 hour   Intake 480 ml   Output 800 ml   Net -320 ml       Physical Exam:     Physical Exam  Constitutional:       Appearance: She is obese  Cardiovascular:      Rate and Rhythm: Normal rate and regular rhythm  Pulses: Normal pulses  Heart sounds: Normal heart sounds     Pulmonary:      Effort: Pulmonary effort is normal  No respiratory distress  Breath sounds: Normal breath sounds  No wheezing or rales  Abdominal:      General: Bowel sounds are normal  There is no distension  Palpations: Abdomen is soft  Tenderness: There is no abdominal tenderness  There is no guarding  Musculoskeletal:      Cervical back: Normal range of motion and neck supple  Right lower leg: No edema  Left lower leg: No edema  Comments: Left BKA   Skin:     General: Skin is warm and dry  Neurological:      General: No focal deficit present  Mental Status: She is alert and oriented to person, place, and time  Mental status is at baseline  Cranial Nerves: No cranial nerve deficit  Motor: No weakness  Additional Data:     Labs:    Results from last 7 days   Lab Units 04/05/23  0742   WBC Thousand/uL 14 52*   HEMOGLOBIN g/dL 8 7*   HEMATOCRIT % 25 4*   PLATELETS Thousands/uL 391*     Results from last 7 days   Lab Units 04/05/23  0742   SODIUM mmol/L 136   POTASSIUM mmol/L 3 9   CHLORIDE mmol/L 107   CO2 mmol/L 26   BUN mg/dL 13   CREATININE mg/dL 0 47*   ANION GAP mmol/L 3*   CALCIUM mg/dL 8 4   GLUCOSE RANDOM mg/dL 225*         Results from last 7 days   Lab Units 04/08/23  1108 04/08/23  0738 04/07/23  2132 04/07/23  2056 04/07/23  1552 04/07/23  1144 04/07/23  0744 04/06/23  2051 04/06/23  1736 04/06/23  1637 04/06/23  1122 04/06/23  0720   POC GLUCOSE mg/dl 106 216* 289* 276* 189* 98 223* 158* 143* 125 141* 194*         Results from last 7 days   Lab Units 04/06/23  0234 04/06/23  0201   LACTIC ACID mmol/L 2 0  --    PROCALCITONIN ng/ml  --  0 06           * I Have Reviewed All Lab Data Listed Above  * Additional Pertinent Lab Tests Reviewed:  All Labs Within Last 24 Hours Reviewed    Imaging:    Imaging Reports Reviewed Today Include:   Imaging Personally Reviewed by Myself Includes:      Recent Cultures (last 7 days):     Results from last 7 days   Lab Units 04/07/23  0516   URINE CULTURE  >100,000 cfu/ml Proteus mirabilis*       Last 24 Hours Medication List:   Current Facility-Administered Medications   Medication Dose Route Frequency Provider Last Rate   • acetaminophen  975 mg Oral Q8H [facility] Audrey Ordonez MD     • albuterol  2 puff Inhalation Q4H PRN Audrey Ordonez MD     • aspirin  81 mg Oral Daily Audrey Ordonez MD     • atorvastatin  40 mg Oral Daily With Columba Aguilar MD     • bisacodyl  10 mg Rectal Daily PRN Audrey Ordonez MD     • cefTRIAXone  1,000 mg Intravenous Q24H Lalo Solorzano DO     • fluticasone-vilanterol  1 puff Inhalation Daily Audrey Ordonez MD     • gabapentin  300 mg Oral BID Audrey Ordonez MD     • hydrALAZINE  10 mg Intravenous Q6H PRN Audrey Ordonez MD     • HYDROmorphone  1 mg Intravenous Q4H PRN Audrey Ordonez MD     • insulin glargine  45 Units Subcutaneous HS Yamileth Francis MD     • [START ON 4/9/2023] insulin lispro  18 Units Subcutaneous Daily With Breakfast Yamileth Francis MD     • insulin lispro  2-12 Units Subcutaneous TID AC Audrey Ordonez MD     • insulin lispro  2-12 Units Subcutaneous HS Audrey Ordonez MD     • insulin lispro  20 Units Subcutaneous Daily With Lunch Yamileth Francis MD      And   • insulin lispro  22 Units Subcutaneous Daily With Silvestre Veloz MD     • [START ON 4/9/2023] lisinopril  20 mg Oral Daily Aaliyah Agudelo DO     • melatonin  6 mg Oral HS Audrey Ordonez MD     • methocarbamol  500 mg Oral Q6H [facility] Audrey Ordonez MD     • naloxone  0 04 mg Intravenous Q1MIN PRN Audrey Ordonez MD     • nicotine  21 mg Transdermal Daily Audrey Ordonez MD     • oxyCODONE  10 mg Oral Q4H PRN Audrey Ordonez MD     • oxyCODONE  5 mg Oral Q4H PRN Audrey Ordonez MD     • polyethylene glycol  17 g Oral Daily Audrey Ordonez MD     • prazosin  1 mg Oral HS Meredith Gaona MD     • QUEtiapine  200 mg Oral HS Meerdith Gaona MD     • rivaroxaban  15 mg Oral BID With Meals DO Laura     • [START ON 4/21/2023] rivaroxaban  20 mg Oral Daily With Breakfast DO Laura     • senna-docusate sodium  2 tablet Oral BID Meredith Gaona MD     • simethicone  80 mg Oral Q6H PRN Meredith Gaona MD     • topiramate  25 mg Oral BID Meredith Gaona MD     • white petrolatum-mineral oil   Topical TID PRN DO Laura          Today, Patient Was Seen By: DO Laura    ** Please Note: Dictation voice to text software may have been used in the creation of this document   **

## 2023-04-08 NOTE — PROGRESS NOTES
L BKA dressing changed  Cleansed with NS, Betadine applied  Guaze and abd w/ tape applied  Patient tolerated well  No drainage or abnormalities present  Dressing is clean, dry, and intact

## 2023-04-08 NOTE — PLAN OF CARE
Problem: MOBILITY - ADULT  Goal: Maintain or return to baseline ADL function  Description: INTERVENTIONS:  -  Assess patient's ability to carry out ADLs; assess patient's baseline for ADL function and identify physical deficits which impact ability to perform ADLs (bathing, care of mouth/teeth, toileting, grooming, dressing, etc )  - Assess/evaluate cause of self-care deficits   - Assess range of motion  - Assess patient's mobility; develop plan if impaired  - Assess patient's need for assistive devices and provide as appropriate  - Encourage maximum independence but intervene and supervise when necessary  - Involve family in performance of ADLs  - Assess for home care needs following discharge   - Consider OT consult to assist with ADL evaluation and planning for discharge  - Provide patient education as appropriate  Outcome: Progressing  Goal: Maintains/Returns to pre admission functional level  Description: INTERVENTIONS:  - Perform BMAT or MOVE assessment daily    - Set and communicate daily mobility goal to care team and patient/family/caregiver     - Collaborate with rehabilitation services on mobility goals if consulted  Outcome: Progressing     Problem: Prexisting or High Potential for Compromised Skin Integrity  Goal: Skin integrity is maintained or improved  Description: INTERVENTIONS:  - Identify patients at risk for skin breakdown  - Assess and monitor skin integrity  - Assess and monitor nutrition and hydration status  - Monitor labs   - Assess for incontinence   - Turn and reposition patient  - Assist with mobility/ambulation  - Relieve pressure over bony prominences  - Avoid friction and shearing  - Provide appropriate hygiene as needed including keeping skin clean and dry  - Evaluate need for skin moisturizer/barrier cream  - Collaborate with interdisciplinary team   - Patient/family teaching  - Consider wound care consult   Outcome: Progressing     Problem: Nutrition/Hydration-ADULT  Goal: Nutrient/Hydration intake appropriate for improving, restoring or maintaining nutritional needs  Description: Monitor and assess patient's nutrition/hydration status for malnutrition  Collaborate with interdisciplinary team and initiate plan and interventions as ordered  Monitor patient's weight and dietary intake as ordered or per policy  Utilize nutrition screening tool and intervene as necessary  Determine patient's food preferences and provide high-protein, high-caloric foods as appropriate       INTERVENTIONS:  - Monitor oral intake, urinary output, labs, and treatment plans  - Assess nutrition and hydration status and recommend course of action  - Evaluate amount of meals eaten  - Assist patient with eating if necessary   - Allow adequate time for meals  - Recommend/ encourage appropriate diets, oral nutritional supplements, and vitamin/mineral supplements  - Order, calculate, and assess calorie counts as needed  - Recommend, monitor, and adjust tube feedings and TPN/PPN based on assessed needs  - Assess need for intravenous fluids  - Provide specific nutrition/hydration education as appropriate  - Include patient/family/caregiver in decisions related to nutrition  Outcome: Progressing     Problem: SAFETY,RESTRAINT: NV/NON-SELF DESTRUCTIVE BEHAVIOR  Goal: Remains free of harm/injury (restraint for non violent/non self-detsructive behavior)  Description: INTERVENTIONS:  - Instruct patient/family regarding restraint use   - Assess and monitor physiologic and psychological status   - Provide interventions and comfort measures to meet assessed patient needs   - Identify and implement measures to help patient regain control  - Assess readiness for release of restraint   Outcome: Progressing  Goal: Returns to optimal restraint-free functioning  Description: INTERVENTIONS:  - Assess the patient's behavior and symptoms that indicate continued need for restraint  - Identify and implement measures to help patient regain control  - Assess readiness for release of restraint   Outcome: Progressing     Problem: PAIN - ADULT  Goal: Verbalizes/displays adequate comfort level or baseline comfort level  Description: Interventions:  - Encourage patient to monitor pain and request assistance  - Assess pain using appropriate pain scale  - Administer analgesics based on type and severity of pain and evaluate response  - Implement non-pharmacological measures as appropriate and evaluate response  - Consider cultural and social influences on pain and pain management  - Notify physician/advanced practitioner if interventions unsuccessful or patient reports new pain  Outcome: Progressing

## 2023-04-08 NOTE — ASSESSMENT & PLAN NOTE
Lab Results   Component Value Date    HGBA1C 9 8 (A) 12/20/2022       Recent Labs     04/07/23 2056 04/07/23  2132 04/08/23  0738 04/08/23  1108   POCGLU 276* 289* 216* 106       Blood Sugar Average: Last 72 hrs:  (P) 181 875     Diabetes mellitus type 2 uncontrolled  Endocrinology following   Cont insulin regimen as per their recommendations  Poor diet compliance  Hypoglycemia protocol in place

## 2023-04-08 NOTE — PLAN OF CARE
Problem: PAIN - ADULT  Goal: Verbalizes/displays adequate comfort level or baseline comfort level  Description: Interventions:  - Encourage patient to monitor pain and request assistance  - Assess pain using appropriate pain scale  - Administer analgesics based on type and severity of pain and evaluate response  - Implement non-pharmacological measures as appropriate and evaluate response  - Consider cultural and social influences on pain and pain management  - Notify physician/advanced practitioner if interventions unsuccessful or patient reports new pain  Outcome: Progressing     Problem: Prexisting or High Potential for Compromised Skin Integrity  Goal: Skin integrity is maintained or improved  Description: INTERVENTIONS:  - Identify patients at risk for skin breakdown  - Assess and monitor skin integrity  - Assess and monitor nutrition and hydration status  - Monitor labs   - Assess for incontinence   - Turn and reposition patient  - Assist with mobility/ambulation  - Relieve pressure over bony prominences  - Avoid friction and shearing  - Provide appropriate hygiene as needed including keeping skin clean and dry  - Evaluate need for skin moisturizer/barrier cream  - Collaborate with interdisciplinary team   - Patient/family teaching  - Consider wound care consult   4/8/2023 0320 by Lynne Starr RN  Outcome: Progressing     Problem: MOBILITY - ADULT  Goal: Maintains/Returns to pre admission functional level  Description: INTERVENTIONS:  - Perform BMAT or MOVE assessment daily    - Set and communicate daily mobility goal to care team and patient/family/caregiver     - Collaborate with rehabilitation services on mobility goals if consulted  4/8/2023 0320 by Lynne Starr RN  Outcome: Progressing

## 2023-04-09 NOTE — PROGRESS NOTES
[facility] [facility]  Progress Note  Name: Nicolasa Lemons  MRN: [id number]  Unit/Bed#: [facility] 904-01 I Date of Admission: 3/24/2023   Date of Service: 4/9/2023 I Hospital Day: 16    Assessment/Plan   * S/P BKA (below knee amputation) unilateral, left ([facility] 75 )  Assessment & Plan  S/p left BKA with vascular surgery  Vascular surgery inputs noted  S/P APS consult for pain regimen    PAD (peripheral artery disease) ([place] [address] )  Assessment & Plan  Hx of occluded bypass and profunda thrombus  Continue aspirin/statin  Continue xarelto, on 15mg bid x 21 days then 20mg qd  Vascular surgery inputs noted  S/p hematology consult complete thrombophilia evaluation as outpt  Deemed no need to obtain here as has been already on a/c likely to skew results    Leukocytosis  Assessment & Plan  Secondary to acute cystitis  On IV CTX  Urine cx with proteus  WBC trending down    Anemia  Assessment & Plan  Stable  Cont to monitor    Acute respiratory distress  Assessment & Plan  Acute respiratory distress present on admission  Postoperatively patient was noted to have bronchospasm requiring reintubation admission to the ICU  Hx of asthma   S/p completion of steroid taper  Extubated successfully  Respiratory distress resolved; on RA  Encourage incentive spirometry  Cont pulm inhalers    BMI 45 0-49 9, adult [facility])  Assessment & Plan  Therapeutic lifestyle modification encouraged      Bipolar disorder ([place] [address] )  Assessment & Plan  Continue Seroquel, topiramate  S/p psych consult, cleared for snf discharge    Nicotine dependence  Assessment & Plan  Smoking cessation discussed and encouraged    Type 2 diabetes mellitus with diabetic polyneuropathy, with long-term current use of insulin [facility])  Assessment & Plan  Lab Results   Component Value Date    HGBA1C 9 8 (A) 12/20/2022       Recent Labs     04/08/23  1612 04/08/23  2136 04/09/23  0725 04/09/23  1207   POCGLU 151* 217* 217* 145*       Blood Sugar Average: Last 72 VSS. NADN. Respirations unlabored. Mom has been formula feeding her .  is voiding and stooling. All screening passed or WNL. Discharge orders in place. Mom and grandmother at bedside with .    hrs:  (P) 180 5     Diabetes mellitus type 2 uncontrolled  Endocrinology following  Cont insulin regimen as per their recommendations  Poor diet compliance  Hypoglycemia protocol in place      Essential hypertension  Assessment & Plan  Stable         VTE Pharmacologic Prophylaxis:   Pharmacologic: Heparin  Mechanical VTE Prophylaxis in Place: No    Patient Centered Rounds: I have performed bedside rounds with nursing staff today  Discussions with Specialists or Other Care Team Provider:     Education and Discussions with Family / Patient: Patient    Time Spent for Care: 30 minutes  More than 50% of total time spent on counseling and coordination of care as described above  Current Length of Stay: 16 day(s)    Current Patient Status: Inpatient   Certification Statement: The patient will continue to require additional inpatient hospital stay due to pending placement    Discharge Plan: Snf awaiting placement    Code Status: Level 1 - Full Code      Subjective:   Urinary symptoms improved, Afebrile non toxic appearing  Objective:     Vitals:   Temp (24hrs), Av 8 °F (36 6 °C), Min:97 5 °F (36 4 °C), Max:98 4 °F (36 9 °C)    Temp:  [97 5 °F (36 4 °C)-98 4 °F (36 9 °C)] 97 5 °F (36 4 °C)  HR:  [112-115] 112  Resp:  [14-18] 18  BP: (122-148)/(75-98) 141/90  SpO2:  [97 %] 97 %  Body mass index is 52 07 kg/m²  Input and Output Summary (last 24 hours): Intake/Output Summary (Last 24 hours) at 2023 1323  Last data filed at 2023 0001  Gross per 24 hour   Intake 480 ml   Output --   Net 480 ml       Physical Exam:     Physical Exam  Constitutional:       Appearance: She is obese  Cardiovascular:      Rate and Rhythm: Normal rate and regular rhythm  Pulses: Normal pulses  Heart sounds: Normal heart sounds  No murmur heard  Pulmonary:      Effort: Pulmonary effort is normal  No respiratory distress  Breath sounds: Normal breath sounds  No wheezing or rales     Abdominal:      General: Bowel sounds are normal  There is no distension  Palpations: Abdomen is soft  Tenderness: There is no abdominal tenderness  There is no guarding  Musculoskeletal:      Cervical back: Normal range of motion and neck supple  Right lower leg: No edema  Left lower leg: No edema  Comments: L BKA   Skin:     General: Skin is warm and dry  Neurological:      General: No focal deficit present  Mental Status: She is alert and oriented to person, place, and time  Mental status is at baseline  Cranial Nerves: No cranial nerve deficit  Motor: No weakness  Additional Data:     Labs:    Results from last 7 days   Lab Units 04/09/23  0513   WBC Thousand/uL 11 33*   HEMOGLOBIN g/dL 8 4*   HEMATOCRIT % 25 4*   PLATELETS Thousands/uL 383     Results from last 7 days   Lab Units 04/09/23  0513   SODIUM mmol/L 135   POTASSIUM mmol/L 3 8   CHLORIDE mmol/L 108   CO2 mmol/L 25   BUN mg/dL 15   CREATININE mg/dL 0 59*   ANION GAP mmol/L 2*   CALCIUM mg/dL 8 2*   GLUCOSE RANDOM mg/dL 251*         Results from last 7 days   Lab Units 04/09/23  1207 04/09/23  0725 04/08/23  2136 04/08/23  1612 04/08/23  1108 04/08/23  0738 04/07/23  2132 04/07/23  2056 04/07/23  1552 04/07/23  1144 04/07/23  0744 04/06/23  2051   POC GLUCOSE mg/dl 145* 217* 217* 151* 106 216* 289* 276* 189* 98 223* 158*         Results from last 7 days   Lab Units 04/06/23  0234 04/06/23  0201   LACTIC ACID mmol/L 2 0  --    PROCALCITONIN ng/ml  --  0 06           * I Have Reviewed All Lab Data Listed Above  * Additional Pertinent Lab Tests Reviewed:  All Labs Within Last 24 Hours Reviewed    Imaging:    Imaging Reports Reviewed Today Include:   Imaging Personally Reviewed by Myself Includes:      Recent Cultures (last 7 days):     Results from last 7 days   Lab Units 04/07/23  0516   URINE CULTURE  >100,000 cfu/ml Proteus mirabilis*  10,000-19,000 cfu/ml Enterococcus faecalis*       Last 24 Hours Medication List:   Current Facility-Administered Medications   Medication Dose Route Frequency Provider Last Rate   • acetaminophen  975 mg Oral Q8H [address] Abdoulaye Shukla MD     • albuterol  2 puff Inhalation Q4H PRN Abdoulaye Shukla MD     • aspirin  81 mg Oral Daily Abdoulaye Shukla MD     • atorvastatin  40 mg Oral Daily With Marquise Ha MD     • bisacodyl  10 mg Rectal Daily PRN Abdoulaye Shukla MD     • cefTRIAXone  1,000 mg Intravenous Q24H Vianney Don, DO 1,000 mg (04/08/23 1632)   • fluticasone-vilanterol  1 puff Inhalation Daily Abdoulaye Shukla MD     • gabapentin  300 mg Oral BID Abdoulaye Shukla MD     • hydrALAZINE  10 mg Intravenous Q6H PRN Abdoulaye Shukla MD     • HYDROmorphone  1 mg Intravenous Q4H PRN Abdoulaye Shukla MD     • insulin glargine  45 Units Subcutaneous HS Lali Almanzar MD     • insulin lispro  18 Units Subcutaneous Daily With Breakfast Lali Almanzar MD     • insulin lispro  2-12 Units Subcutaneous TID AC Abdoulaye Shukla MD     • insulin lispro  2-12 Units Subcutaneous HS Abdoulaye Shukla MD     • insulin lispro  20 Units Subcutaneous Daily With Lunch Lali Almanzar MD      And   • insulin lispro  22 Units Subcutaneous Daily With Sung Waldrop MD     • lisinopril  20 mg Oral Daily Vianneychris Chambers, DO     • melatonin  6 mg Oral HS Abdoulaye Shukla MD     • methocarbamol  500 mg Oral Q6H [address] Abdoulaye Shukla MD     • naloxone  0 04 mg Intravenous Q1MIN PRN Abdoulaye Shukla MD     • nicotine  21 mg Transdermal Daily Abdoulaye Shukla MD     • oxyCODONE  10 mg Oral Q4H PRN Abdoulaye Shukla MD     • oxyCODONE  5 mg Oral Q4H PRN Abdoulaye Shukla MD     • polyethylene glycol  17 g Oral Daily Abdoulaye Shukla MD     • prazosin  1 mg Oral HS Abdoulaye Shukla MD     • QUEtiapine  200 mg Oral HS Abdoulaye Shukla MD     • rivaroxaban  15 mg Oral BID With Meals Kassidy Rutherford DO     • [START ON 4/21/2023] rivaroxaban  20 mg Oral Daily With Breakfast Kassidy Rutherford DO     • senna-docusate sodium  2 tablet Oral BID Thad Villasenor MD     • simethicone  80 mg Oral Q6H PRN Thad Villasenor MD     • topiramate  25 mg Oral BID Thad Villasenor MD     • white petrolatum-mineral oil   Topical TID PRN Kassidy Rutherford DO          Today, Patient Was Seen By: Kassidy Rutherford DO    ** Please Note: Dictation voice to text software may have been used in the creation of this document   **

## 2023-04-09 NOTE — PLAN OF CARE
Problem: PAIN - ADULT  Goal: Verbalizes/displays adequate comfort level or baseline comfort level  Description: Interventions:  - Encourage patient to monitor pain and request assistance  - Assess pain using appropriate pain scale  - Administer analgesics based on type and severity of pain and evaluate response  - Implement non-pharmacological measures as appropriate and evaluate response  - Consider cultural and social influences on pain and pain management  - Notify physician/advanced practitioner if interventions unsuccessful or patient reports new pain  Outcome: Progressing     Problem: MOBILITY - ADULT  Goal: Maintains/Returns to pre admission functional level  Description: INTERVENTIONS:  - Perform BMAT or MOVE assessment daily    - Set and communicate daily mobility goal to care team and patient/family/caregiver     - Collaborate with rehabilitation services on mobility goals if consulted  Outcome: Progressing

## 2023-04-09 NOTE — ASSESSMENT & PLAN NOTE
Lab Results   Component Value Date    HGBA1C 9 8 (A) 12/20/2022       Recent Labs     04/08/23  1612 04/08/23  2136 04/09/23  0725 04/09/23  1207   POCGLU 151* 217* 217* 145*       Blood Sugar Average: Last 72 hrs:  (P) 180 5     Diabetes mellitus type 2 uncontrolled  Endocrinology following   Cont insulin regimen as per their recommendations  Poor diet compliance  Hypoglycemia protocol in place

## 2023-04-10 NOTE — ASSESSMENT & PLAN NOTE
Lab Results   Component Value Date    HGBA1C 9 8 (A) 12/20/2022       Recent Labs     04/09/23  2204 04/10/23  0837 04/10/23  1147 04/10/23  1631   POCGLU 170* 165* 142* 180*       Blood Sugar Average: Last 72 hrs:  (P) 191 75     Diabetes mellitus type 2 uncontrolled  Endocrinology following   Cont insulin regimen as per their recommendations  Poor diet compliance  Hypoglycemia protocol in place

## 2023-04-10 NOTE — CASE MANAGEMENT
Case Management Discharge Planning Note    Patient name Dulce Rey [facility] 904/[facility] 085-09 MRN [id number]  : 1985 Date 4/10/2023       Current Admission Date: 3/24/2023  Current Admission Diagnosis:S/P BKA (below knee amputation) unilateral, left [facility])   Patient Active Problem List    Diagnosis Date Noted   • Pyuria 2023   • Anemia 2023   • Leukocytosis 2023   • Acute respiratory distress 2023   • Class 3 severe obesity due to excess calories without serious comorbidity with body mass index (BMI) of 40 0 to 44 9 in adult [facility]) 2022   • Surgical wound breakdown, sequela 10/28/2021   • Positive D dimer 10/02/2021   • Atherosclerosis of left leg ([facility] ) 2021   • S/P BKA (below knee amputation) unilateral, left ([place] [address] ) 2021   • Bursitis of left knee 2021   • BMI 45 0-49 9, adult ([place] [address] ) 2021   • PAD (peripheral artery disease) ([place] [address] ) 2021   • Diarrhea 2021   • Bilateral leg pain 2021   • Leg pain 2020   • Headache 2020   • Essential hypertension 2020   • Type 2 diabetes mellitus with diabetic polyneuropathy, with long-term current use of insulin ([place] [address] ) 2020   • Paresthesia 2020   • COPD (chronic obstructive pulmonary disease) ([place] [address] ) 2020   • Nicotine dependence 2020   • Bipolar disorder ([place] [address] ) 2020      LOS (days): 17  Geometric Mean LOS (GMLOS) (days): 4 30  Days to GMLOS:-12 7     OBJECTIVE:  Risk of Unplanned Readmission Score: 27 03         Current admission status: Inpatient   Preferred Pharmacy:   [address] [place] [id number]  Phone: [phone] Fax: [phone]    [facility] [address] [place] [address]  [address] [facility] [place] [address] [id number]  Phone: [phone] Fax: [phone] [address] [place] [facility] [place]  [place] [id number]  Phone: [phone] Fax: [phone]    Primary Care Provider: Katrina Aquino MD    Primary Insurance: [facility] REP  Secondary Insurance: [address] [place] [address] [facility],Third Floor DETAILS:               Other Referral/Resources/Interventions Provided:  Referral Comments: Pt discussed during care coordination rounds  Pt would be a good acute rehab candidate  Noted prior notes that acutes did not accept pt  Referrals sent to [facility] AND [facility] to review again

## 2023-04-10 NOTE — PROGRESS NOTES
[facility]  Progress Note  Name: Luis A Donald  MRN: [id number]  [facility] I Date of Admission: 3/24/2023   Date of Service: 4/10/2023 I Hospital Day: 17    Assessment/Plan   * S/P BKA (below knee amputation) unilateral, left ([facility] )  Assessment & Plan  S/p left BKA with vascular surgery  Vascular surgery inputs noted  S/P APS consult for pain regimen    PAD (peripheral artery disease) ([facility] )  Assessment & Plan  Hx of occluded bypass and profunda thrombus  Continue aspirin/statin  Continue xarelto, on 15mg bid x 21 days then 20mg qd  Vascular surgery inputs noted  S/p hematology consult complete thrombophilia evaluation as outpt  Deemed no need to obtain here as has been already on a/c likely to skew results    Leukocytosis  Assessment & Plan  Secondary to acute cystitis  Completed course of IV ctx  Urine cx with proteus  WBC trending down    Anemia  Assessment & Plan  Stable  Cont to monitor    Acute respiratory distress  Assessment & Plan  Acute respiratory distress present on admission  Postoperatively patient was noted to have bronchospasm requiring reintubation admission to the ICU  Hx of asthma   S/p completion of steroid taper  Extubated successfully  Respiratory distress resolved; on RA  Encourage incentive spirometry  Cont pulm inhalers    BMI 45 0-49 9, adult [facility])  Assessment & Plan  Therapeutic lifestyle modification encouraged      Bipolar disorder ([place] [address] )  Assessment & Plan  Continue Seroquel, topiramate  S/p psych consult, cleared for snf discharge    Nicotine dependence  Assessment & Plan  Smoking cessation discussed and encouraged    Type 2 diabetes mellitus with diabetic polyneuropathy, with long-term current use of insulin [facility])  Assessment & Plan  Lab Results   Component Value Date    HGBA1C 9 8 (A) 12/20/2022       Recent Labs     04/09/23  2204 04/10/23  0837 04/10/23  1147 04/10/23  1631   POCGLU 170* 165* 142* 180*       Blood Sugar Average: Last 72 hrs:  (P) 191 75     Diabetes mellitus type 2 uncontrolled  Endocrinology following  Cont insulin regimen as per their recommendations  Poor diet compliance  Hypoglycemia protocol in place      Essential hypertension  Assessment & Plan  Stable           VTE Pharmacologic Prophylaxis:   Pharmacologic: Rivaroxaban (Xarelto)  Mechanical VTE Prophylaxis in Place: No    Patient Centered Rounds: I have performed bedside rounds with nursing staff today  Discussions with Specialists or Other Care Team Provider:     Education and Discussions with Family / Patient: Patient    Time Spent for Care: 30 minutes  More than 50% of total time spent on counseling and coordination of care as described above  Current Length of Stay: 17 day(s)    Current Patient Status: Inpatient   Certification Statement: The patient will continue to require additional inpatient hospital stay due to Awaiting placement    Discharge Plan:     Code Status: Level 1 - Full Code      Subjective:   No acute events overnight  Has no other complaints    Objective:     Vitals:   Temp (24hrs), Av 3 °F (36 3 °C), Min:96 8 °F (36 °C), Max:97 7 °F (36 5 °C)    Temp:  [96 8 °F (36 °C)-97 7 °F (36 5 °C)] 96 8 °F (36 °C)  HR:  [105-109] 109  Resp:  [20] 20  BP: (132-134)/(81-83) 132/81  SpO2:  [94 %-99 %] 94 %  Body mass index is 53 57 kg/m²  Input and Output Summary (last 24 hours): Intake/Output Summary (Last 24 hours) at 4/10/2023 1801  Last data filed at 4/10/2023 1401  Gross per 24 hour   Intake 290 ml   Output 600 ml   Net -310 ml       Physical Exam:     Physical Exam  Constitutional:       Appearance: She is obese  Cardiovascular:      Rate and Rhythm: Normal rate and regular rhythm  Pulses: Normal pulses  Heart sounds: Normal heart sounds  Pulmonary:      Effort: Pulmonary effort is normal  No respiratory distress  Breath sounds: Normal breath sounds  No wheezing or rales     Abdominal:      General: Bowel sounds are normal  There is no distension  Palpations: Abdomen is soft  Tenderness: There is no abdominal tenderness  There is no guarding  Musculoskeletal:      Cervical back: Normal range of motion and neck supple  Right lower leg: No edema  Left lower leg: No edema  Comments: Left BKA   Skin:     General: Skin is warm and dry  Neurological:      General: No focal deficit present  Mental Status: She is alert and oriented to person, place, and time  Mental status is at baseline  Cranial Nerves: No cranial nerve deficit  Motor: No weakness  Additional Data:     Labs:    Results from last 7 days   Lab Units 04/09/23  0513   WBC Thousand/uL 11 33*   HEMOGLOBIN g/dL 8 4*   HEMATOCRIT % 25 4*   PLATELETS Thousands/uL 383     Results from last 7 days   Lab Units 04/09/23  0513   SODIUM mmol/L 135   POTASSIUM mmol/L 3 8   CHLORIDE mmol/L 108   CO2 mmol/L 25   BUN mg/dL 15   CREATININE mg/dL 0 59*   ANION GAP mmol/L 2*   CALCIUM mg/dL 8 2*   GLUCOSE RANDOM mg/dL 251*         Results from last 7 days   Lab Units 04/10/23  1631 04/10/23  1147 04/10/23  0837 04/09/23  2204 04/09/23  1626 04/09/23  1207 04/09/23  0725 04/08/23  2136 04/08/23  1612 04/08/23  1108 04/08/23  0738 04/07/23  2132   POC GLUCOSE mg/dl 180* 142* 165* 170* 284* 145* 217* 217* 151* 106 216* 289*         Results from last 7 days   Lab Units 04/06/23  0234 04/06/23  0201   LACTIC ACID mmol/L 2 0  --    PROCALCITONIN ng/ml  --  0 06           * I Have Reviewed All Lab Data Listed Above    * Additional Pertinent Lab Tests Reviewed: No New Labs Available For Today    Imaging:    Imaging Reports Reviewed Today Include:   Imaging Personally Reviewed by Myself Includes:      Recent Cultures (last 7 days):     Results from last 7 days   Lab Units 04/07/23  0516   URINE CULTURE  >100,000 cfu/ml Proteus mirabilis*  10,000-19,000 cfu/ml Enterococcus faecalis*       Last 24 Hours Medication List:   Current Facility-Administered Medications   Medication Dose Route Frequency Provider Last Rate   • acetaminophen  975 mg Oral Q8H [address] Racheal Valdivia MD     • albuterol  2 puff Inhalation Q4H PRN Racheal Valdivia MD     • aspirin  81 mg Oral Daily Racheal Valdivia MD     • atorvastatin  40 mg Oral Daily With Travis De Santiago MD     • bisacodyl  10 mg Rectal Daily PRN Racheal Valdivia MD     • fluticasone-vilanterol  1 puff Inhalation Daily Racheal Valdivia MD     • gabapentin  300 mg Oral BID Racheal Valdivia MD     • hydrALAZINE  10 mg Intravenous Q6H PRN Racheal Valdivia MD     • HYDROmorphone  1 mg Intravenous Q4H PRN Racheal Valdivia MD     • insulin glargine  45 Units Subcutaneous HS Dwayne Osborn MD     • insulin lispro  18 Units Subcutaneous Daily With Breakfast Dwayne Osborn MD     • insulin lispro  2-12 Units Subcutaneous TID AC Racheal Valdivia MD     • insulin lispro  2-12 Units Subcutaneous HS Racheal Valdivia MD     • insulin lispro  20 Units Subcutaneous Daily With Lunch Dwayne Osborn MD      And   • insulin lispro  22 Units Subcutaneous Daily With Nikolai Mistry MD     • lisinopril  20 mg Oral Daily Sandy Nissen, DO     • melatonin  6 mg Oral HS Racheal Valdivia MD     • methocarbamol  500 mg Oral Q6H [address] Racheal Valdivia MD     • naloxone  0 04 mg Intravenous Q1MIN PRN Racheal Valdviia MD     • nicotine  21 mg Transdermal Daily Racheal Valdivia MD     • oxyCODONE  10 mg Oral Q4H PRN Racheal Valdivia MD     • oxyCODONE  5 mg Oral Q4H PRN Racheal Valdivia MD     • polyethylene glycol  17 g Oral Daily Racheal Valdivia MD     • prazosin  1 mg Oral HS Racheal Valdivia MD     • QUEtiapine  200 mg Oral HS Racheal Valdivia MD     • rivaroxaban  15 mg Oral BID With Meals Sandy Nissen, DO     • [START ON 4/21/2023] rivaroxaban  20 mg Oral Daily With Breakfast Elder Blas DO     • senna-docusate sodium  2 tablet Oral BID Joe Swanson MD     • simethicone  80 mg Oral Q6H PRN Joe Swanson MD     • topiramate  25 mg Oral BID Joe Swanson MD     • white petrolatum-mineral oil   Topical TID PRN Elder Blas DO          Today, Patient Was Seen By: Elder Blas DO    ** Please Note: Dictation voice to text software may have been used in the creation of this document   **

## 2023-04-11 NOTE — PHYSICAL THERAPY NOTE
PHYSICAL THERAPY NOTE      Patient Name: Marleni Finn  HKYOM'V Date: 4/11/2023 04/11/23 1340   PT Last Visit   PT Visit Date 04/11/23   Pain Assessment   Pain Assessment Tool 0-10   Pain Score 10 - Worst Possible Pain   Pain Location/Orientation Orientation: Left; Location: Leg   Hospital Pain Intervention(s) Repositioned; Ambulation/increased activity   Restrictions/Precautions   Weight Bearing Precautions Per Order Yes   LLE Weight Bearing Per Order NWB  (L LE AKA)   Other Precautions Fall Risk;Pain;WBS   General   Family/Caregiver Present No   Cognition   Overall Cognitive Status WFL   Attention Within functional limits   Orientation Level Oriented X4   Memory Within functional limits   Following Commands Follows one step commands without difficulty   Comments pt pleasant and cooperative t/o PT session   Bed Mobility   Rolling R Unable to assess   Rolling L Unable to assess   Supine to Sit 5  Supervision   Additional items HOB elevated; Bedrails; Increased time required   Sit to Supine 5  Supervision   Additional items Increased time required;HOB elevated   Additional Comments Upon arrival, pt supine in bed  Pt returned to bed with call bell and all needs within reach following PT session   Transfers   Sit to Stand 5  Supervision   Additional items Increased time required   Stand to Sit 5  Supervision   Additional items Increased time required   Stand pivot 4  Minimal assistance  (CGA for stand pivot from EOB <> WC w/ RW)   Additional items Assist x 1; Increased time required;Verbal cues   Additional Comments Pt completed STS transfer from elevated bed surface with no AD  SPT from Bed <> WC with RW hopping on RLE   Ambulation/Elevation   Gait pattern Forward Flexion;Decreased foot clearance; Short stride  (hopping technique with heavy reliance on BL UE on RW)   Gait Assistance 4  Minimal assist   Additional items Assist x 1;Verbal cues   Assistive Device Rolling walker   Distance 3' x2 trials   Ambulation/Elevation Additional Comments Ambulation limited 2* UE fatigue and LLE pain   Wheelchair Activities   Wheelchair Cushion Standard   Wheelchair Parts Management Yes   Left Brakes Level of Assistance Minimum assistance   Right Brakes Level of Assistance Minimum assistance   Propulsion Yes   Propulsion Type 1 Manual   Level 1 Level tile   Method 1 Right upper extremity; Left upper extremity   Level of Assistance 1 Minimum assistance   Description/ Details 1 pt propelled WC for 20'x3 trials with Min Ax1 for steering and propulsion  Seated rest breaks taken in between  WC mobility limtied 2* UE fatigue and general deconditioning   Balance   Static Sitting Fair +   Dynamic Sitting Fair   Static Standing Fair -   Dynamic Standing Fair -   Ambulatory Poor +   Endurance Deficit   Endurance Deficit Yes   Endurance Deficit Description UE fatigue, elevated HR, L LE pain   Activity Tolerance   Activity Tolerance Patient limited by fatigue;Patient limited by pain   Nurse Made Aware RN cleared pt for PT session  Informed nurse of increase in L leg pain   Assessment   Prognosis Good   Problem List Decreased strength;Decreased range of motion;Decreased endurance; Impaired balance;Decreased mobility; Decreased safety awareness;Pain;Orthopedic restrictions   Assessment Pt seen for PT treatment session this date  Therapy session focused on WC mobility and transfer training in order to improve overall mobility and independence  Pt demonstrates improved STS transfer and bed mobility to decrease burden of care  Pt instructed in WC mobility using BL UE for propulsion and educated on break management with pt verbalizing and demonstrating understanding  Pt propelled WC with min Ax1 for occasional steering and propulsion for 20' x3  Seated rest breaks taken in between 2* UE fatigue and elevated HR  Session limited 2* increase LLE phantom pain and general fatigue   Educated pt on UE exercises including WC push ups, bicep curls and lateral weight shifts to improve UE strength for WC mobility and transfers  Pt making good progress toward goals  Pt was left supine in bed with call bell at the end of PT session with all needs in reach  Pt would benefit from continued PT services while in hospital to address remaining limitations  PT to continue treating pt and recommends post-acute rehab services  The patient's AM-PAC Basic Mobility Inpatient Short Form Raw Score is 15  A Raw score of less than or equal to 16 suggests the patient may benefit from discharge to post-acute rehabilitation services  Please also refer to the recommendation of the Physical Therapist for safe discharge planning  Barriers to Discharge Inaccessible home environment;Decreased caregiver support   Goals   Patient Goals to rest   STG Expiration Date 04/25/23   PT Treatment Day 3   Plan   Treatment/Interventions Functional transfer training;LE strengthening/ROM; Elevations; Therapeutic exercise; Endurance training;Patient/family training;Equipment eval/education; Bed mobility;Gait training;Spoke to nursing;Spoke to case management   PT Frequency 3-5x/wk   Recommendation   PT Discharge Recommendation Post acute rehabilitation services  (pending continued progress)   Equipment Recommended Wheelchair;Walker   Wheelchair Package Recommended Standard   Walker Package Recommended Wheeled walker   AM-PAC Basic Mobility Inpatient   Turning in Flat Bed Without Bedrails 3   Lying on Back to Sitting on Edge of Flat Bed Without Bedrails 3   Moving Bed to Chair 3   Standing Up From Chair Using Arms 3   Walk in Room 2   Climb 3-5 Stairs With Railing 1   Basic Mobility Inpatient Raw Score 15   Basic Mobility Standardized Score 36 97   Highest Level Of Mobility   JH-HLM Goal 4: Move to chair/commode   JH-HLM Achieved 4: Move to chair/commode   End of Consult   Patient Position at End of Consult Supine;Bed/Chair alarm activated; All needs within reach     Greer Askew SPT  3:31 PM  04/11/23

## 2023-04-11 NOTE — PROGRESS NOTES
[facility]  Progress Note  Name: Kaz Mishra I  MRN: [id number]  Unit/Bed#: [facility]PHP 904-01 I Date of Admission: 3/24/2023   Date of Service: 4/11/2023 I Hospital Day: 25      * S/P left BKA (below knee amputation)   Assessment & Plan  S/p left BKA by vascular surgery on 3/24  Continue ASA/statin in the setting of underlying/predisposing PAD  Optimize pain regimen  Ongoing PT/OT evaluations  Current homeless status is an obstacle to discharge     PAD (peripheral artery disease)   Assessment & Plan  Hx of occluded bypass and profunda thrombus  Continue ASA/statin/Xarelto  Appreciate vascular surgery input  Appreciate hematology input -> thrombophilia evaluation as outpatient as deemed no need to obtain here as has been already on anticoagulation, hence, likely to skew results     Leukocytosis  Assessment & Plan  Likely reactive due to primary issue and recently treated UTI  WBC count improving     Postoperative blood loss anemia  Assessment & Plan  Hemoglobin currently stable at 8 4 -> transfuse if drops < 7 0     Morbid obesity  Assessment & Plan  BMI of 53 57 currently  Lifestyle/diet modifications     Bipolar disorder   Assessment & Plan  Continue Seroquel/Topamax  Appreciate psychiatry input     Nicotine dependence  Assessment & Plan  Cessation counseling  Transdermal nicotine patch on board     Insulin-dependent diabetes mellitus with neuropathy  Assessment & Plan  Lab Results   Component Value Date     HGBA1C 9 8 (A) 12/20/2022      Appreciate endocrinology input  Continue basal/prandial insulin with additional SSI coverage per Accu-Cheks  On Gabapentin for neuropathy  History of poor dietary compliance noted  Hypoglycemia protocol   Carbohydrate restriction enforced     Essential hypertension  Assessment & Plan  Continue Zestril/Minipress  Optimize pain control      DVT Prophylaxis:  Xarelto       Patient Centered Rounds:  I have performed bedside rounds and discussed plan of care with nursing today  Discussions with Specialists or Other Care Team Provider:  see above assessments if applicable    Education and Discussions with Family / Patient:  Patient at bedside    Time Spent for Care:  32 minutes  More than 50% of total time spent on counseling and coordination of care as described above  Current Length of Stay: 18 day(s)  Current Patient Status: Inpatient   Certification Statement:  Patient will continue to require additional hospital stay due to assessments as noted above  Code Status: Level 1 - Full Code        Subjective:     Encountered earlier in the day  Complains of waxing/waning pain postoperatively and some weakness/fatigue  Objective:     Vitals:   Temp (24hrs), Av 1 °F (36 2 °C), Min:96 6 °F (35 9 °C), Max:97 5 °F (36 4 °C)    Temp:  [96 6 °F (35 9 °C)-97 5 °F (36 4 °C)] 96 6 °F (35 9 °C)  HR:  [] 108  Resp:  [17-18] 17  BP: (109-133)/(62-78) 109/62  SpO2:  [99 %-100 %] 100 %  Body mass index is 53 57 kg/m²  Input and Output Summary (last 24 hours):        Intake/Output Summary (Last 24 hours) at 2023 1948  Last data filed at 2023 1740  Gross per 24 hour   Intake 229 ml   Output 1200 ml   Net -971 ml       Physical Exam:     GENERAL  obese - waxing/waning distress from postoperative pain   HEAD   Normocephalic - atraumatic   EYES   PERRL - EOMI    MOUTH   Mucosa moist   NECK   Supple - full range of motion   CARDIAC  intermittently tachycardic - S1/S2 positive   PULMONARY    fairly clear to auscultation but diminished respiratory effort due to body habitus   ABDOMEN   Soft - nontender/nondistended - active bowel sounds   MUSCULOSKELETAL   Motor strength/range of motion deconditioned - s/p left BKA with stump dressed/bandaged   NEUROLOGIC   Alert/oriented at baseline   SKIN   Chronic wrinkles/blemishes other than as mentioned on musculoskeletal exam   PSYCHIATRIC   Mood/affect stable         Additional Data:     Labs & Recent Cultures:    Results from last 7 days   Lab Units 04/09/23  0513   WBC Thousand/uL 11 33*   HEMOGLOBIN g/dL 8 4*   HEMATOCRIT % 25 4*   PLATELETS Thousands/uL 383     Results from last 7 days   Lab Units 04/09/23  0513   POTASSIUM mmol/L 3 8   CHLORIDE mmol/L 108   CO2 mmol/L 25   BUN mg/dL 15   CREATININE mg/dL 0 59*   CALCIUM mg/dL 8 2*         Results from last 7 days   Lab Units 04/11/23  1606 04/11/23  1118 04/11/23  0717 04/10/23  2054 04/10/23  1631 04/10/23  1147 04/10/23  0837 04/09/23  2204 04/09/23  1626 04/09/23  1207 04/09/23  0725 04/08/23  2136   POC GLUCOSE mg/dl 147* 133 162* 231* 180* 142* 165* 170* 284* 145* 217* 217*         Results from last 7 days   Lab Units 04/06/23  0234 04/06/23  0201   LACTIC ACID mmol/L 2 0  --    PROCALCITONIN ng/ml  --  0 06         Results from last 7 days   Lab Units 04/07/23  0516   URINE CULTURE  >100,000 cfu/ml Proteus mirabilis*  80,000-89,000 cfu/ml         Lines/Drains:  Invasive Devices     Peripherally Inserted Central Catheter Line  Duration           PICC Line 89/78/06 Right Basilic 17 days                  Last 24 Hours Medication List:   Current Facility-Administered Medications   Medication Dose Route Frequency Provider Last Rate   • acetaminophen  975 mg Oral Q8H [address] Karol Morales MD     • albuterol  2 puff Inhalation Q4H PRN Karol Morales MD     • aspirin  81 mg Oral Daily Karol Morales MD     • atorvastatin  40 mg Oral Daily With Johann Zamarripa MD     • bisacodyl  10 mg Rectal Daily PRN Karol Morales MD     • fluticasone-vilanterol  1 puff Inhalation Daily Karol Morales MD     • gabapentin  300 mg Oral BID Karol Morales MD     • hydrALAZINE  10 mg Intravenous Q6H PRN Karol Morales MD     • HYDROmorphone  1 mg Intravenous Q4H PRN Karol Morales MD     • insulin glargine  45 Units Subcutaneous HS Chandrika Avilez MD     • insulin lispro  18 Units Subcutaneous Daily With Breakfast Derek Bashir MD     • insulin lispro  2-12 Units Subcutaneous TID AC Enmanuel Adames MD     • insulin lispro  2-12 Units Subcutaneous HS Enmanuel Adames MD     • insulin lispro  20 Units Subcutaneous Daily With Lunch Derek Bashir MD      And   • insulin lispro  22 Units Subcutaneous Daily With Reena Lipscomb MD     • lisinopril  20 mg Oral Daily Aaliyah Sary Agudelo DO     • melatonin  6 mg Oral HS Enmanuel Adames MD     • methocarbamol  500 mg Oral Q6H [address] Enmanuel Adames MD     • naloxone  0 04 mg Intravenous Q1MIN PRN Enmanuel Adames MD     • nicotine  21 mg Transdermal Daily Enmanuel Adames MD     • oxyCODONE  10 mg Oral Q4H PRN Enmanuel Adames MD     • oxyCODONE  5 mg Oral Q4H PRN Enmanuel Adames MD     • polyethylene glycol  17 g Oral Daily Enmanuel Adames MD     • prazosin  1 mg Oral HS Enmanuel Adames MD     • QUEtiapine  200 mg Oral HS Enmanuel Adames MD     • rivaroxaban  15 mg Oral BID With Meals Laura DO     • [START ON 4/21/2023] rivaroxaban  20 mg Oral Daily With Breakfast DO Laura     • senna-docusate sodium  2 tablet Oral BID Enmanuel Adames MD     • simethicone  80 mg Oral Q6H PRN Enmanuel Adames MD     • topiramate  25 mg Oral BID Enmanuel Adames MD     • white petrolatum-mineral oil   Topical TID PRN DO Laura                        ** Please Note: This note is constructed using a voice recognition dictation system  An occasional wrong word/phrase or “sound-a-like” substitution may have been picked up by dictation device due to the inherent limitations of voice recognition software  Read the chart carefully and recognize, using reasonable context, where substitutions may have occurred  **

## 2023-04-11 NOTE — OCCUPATIONAL THERAPY NOTE
Occupational Therapy Cancel Note         Patient Name: Josh Lynn  XRELO'A Date: 4/11/2023 04/11/23 1506   OT Last Visit   OT Visit Date 04/11/23   Note Type   Note type Cancelled Session   Cancel Reasons Refusal       OT orders received  Chart reviewed  Attempted to see pt for OT tx session  Pt currently refusing, stating she is too tired and in too much pain  Will continue to follow and see pt as appropriate and able       Lizbeth Sandy MS, OTR/L

## 2023-04-11 NOTE — PLAN OF CARE
Problem: PHYSICAL THERAPY ADULT  Goal: Performs mobility at highest level of function for planned discharge setting  See evaluation for individualized goals  Description: Treatment/Interventions: OT, Spoke to case management, Spoke to nursing, Gait training, Bed mobility, Patient/family training, Endurance training, Functional transfer training, LE strengthening/ROM  Equipment Recommended: Wheelchair, Laura Phy       See flowsheet documentation for full assessment, interventions and recommendations  Outcome: Progressing  Note: Prognosis: Good  Problem List: Decreased strength, Decreased range of motion, Decreased endurance, Impaired balance, Decreased mobility, Decreased safety awareness, Pain, Orthopedic restrictions  Assessment: Pt seen for PT treatment session this date  Therapy session focused on WC mobility and transfer training in order to improve overall mobility and independence  Pt demonstrates improved STS transfer and bed mobility to decrease burden of care  Pt instructed in WC mobility using BL UE for propulsion and educated on break management with pt verbalizing and demonstrating understanding  Pt propelled WC with min Ax1 for occasional steering and propulsion for 20' x3  Seated rest breaks taken in between 2* UE fatigue and elevated HR  Session limited 2* increase LLE phantom pain and general fatigue  Educated pt on UE exercises including WC push ups, bicep curls and lateral weight shifts to improve UE strength for WC mobility and transfers  Pt making good progress toward goals  Pt was left supine in bed with call bell at the end of PT session with all needs in reach  Pt would benefit from continued PT services while in hospital to address remaining limitations  PT to continue treating pt and recommends post-acute rehab services  The patient's AM-PAC Basic Mobility Inpatient Short Form Raw Score is 15   A Raw score of less than or equal to 16 suggests the patient may benefit from discharge to post-acute rehabilitation services  Please also refer to the recommendation of the Physical Therapist for safe discharge planning  Barriers to Discharge: Inaccessible home environment, Decreased caregiver support     PT Discharge Recommendation: Post acute rehabilitation services (Simultaneous filing  User may not have seen previous data )    See flowsheet documentation for full assessment

## 2023-04-12 NOTE — CASE MANAGEMENT
Case Management Discharge Planning Note    Patient name Danis [facility]  Location [facility] 904/[facility] 248-72 MRN [id number]  : 1985 Date 2023       Current Admission Date: 3/24/2023  Current Admission Diagnosis:S/P BKA (below knee amputation) unilateral, left [facility])   Patient Active Problem List    Diagnosis Date Noted   • Pyuria 2023   • Anemia 2023   • Leukocytosis 2023   • Acute respiratory distress 2023   • Class 3 severe obesity due to excess calories without serious comorbidity with body mass index (BMI) of 40 0 to 44 9 in adult [facility]) 2022   • Surgical wound breakdown, sequela 10/28/2021   • Positive D dimer 10/02/2021   • Atherosclerosis of left leg ([place] [address] ) 2021   • S/P BKA (below knee amputation) unilateral, left ([place] [address] ) 2021   • Bursitis of left knee 2021   • BMI 45 0-49 9, adult ([place] [address] ) 2021   • PAD (peripheral artery disease) ([place] [address] ) 2021   • Diarrhea 2021   • Bilateral leg pain 2021   • Leg pain 2020   • Headache 2020   • Essential hypertension 2020   • Type 2 diabetes mellitus with diabetic polyneuropathy, with long-term current use of insulin ([place] [address] ) 2020   • Paresthesia 2020   • COPD (chronic obstructive pulmonary disease) ([place] [address] ) 2020   • Nicotine dependence 2020   • Bipolar disorder ([place] [address] ) 2020      LOS (days): 19  Geometric Mean LOS (GMLOS) (days): 4 30  Days to GMLOS:-14 6     OBJECTIVE:  Risk of Unplanned Readmission Score: 24 29         Current admission status: Inpatient   Preferred Pharmacy:   [address] [place] [id number]  Phone: [phone] Fax: [phone]    [facility] [address] [place]  [address] [facility] [address] [id number]  Phone: [phone] Fax: [phone] [address] [place] [facility] [place]  [place] [address]  Phone: [phone] Fax: [phone]    Primary Care Provider: Reena Stafford MD    [facility]  Secondary Insurance: [facility] [place] DETAILS:    Other Referral/Resources/Interventions Provided:  Referral Comments: [facility] asked for pt's spouse correct phone number to contact them  Provided them updated information  [facility] message later received that they cannot accept pt  [facility] also denied  CM cancelled referral in aidin  [facility]  & informed pt was a denial & she couldn't change response in aidin  Looks like they are available, but are not  Expanded bed search again & updated pt  Options papers were received back already & uploaded into aidin  Copy also sent to medical records

## 2023-04-12 NOTE — PROGRESS NOTES
[facility]  Progress Note  Name: Avril Jacques I  MRN: [id number]  Unit/Bed#: PPHP 904-01 I Date of Admission: 3/24/2023   Date of Service: 4/12/2023 I Hospital Day: 23      * S/P left BKA (below knee amputation)   Assessment & Plan  S/p left BKA by vascular surgery on 3/24  Continue ASA/statin in the setting of underlying/predisposing PAD  Will appreciate pain management input regarding analgesic optimization  Ongoing PT/OT evaluations  Current homeless status is an obstacle to discharge     PAD (peripheral artery disease)   Assessment & Plan  Hx of occluded bypass and profunda thrombus  Continue ASA/statin/Xarelto  Appreciate vascular surgery input  Appreciate hematology input -> thrombophilia evaluation as outpatient as deemed no need to obtain here as has been already on anticoagulation, hence, likely to skew results     Leukocytosis  Assessment & Plan  Likely reactive due to primary issue and recently treated UTI  WBC count normalized today     Postoperative blood loss anemia  Assessment & Plan  Hemoglobin currently stable at 8 4 -> transfuse if drops < 7 0     Morbid obesity  Assessment & Plan  BMI of 53 57 currently  Lifestyle/diet modifications     Bipolar disorder   Assessment & Plan  Continue Seroquel/Topamax  Appreciate psychiatry input     Nicotine dependence  Assessment & Plan  Cessation counseling  Transdermal nicotine patch on board     Insulin-dependent diabetes mellitus with neuropathy  Assessment & Plan        Lab Results   Component Value Date     HGBA1C 9 8 (A) 12/20/2022      Appreciate endocrinology input  Continue basal/prandial insulin with additional SSI coverage per Accu-Cheks  On Gabapentin for neuropathy  History of poor dietary compliance noted  Hypoglycemia protocol   Carbohydrate restriction enforced     Essential hypertension  Assessment & Plan  Continue Zestril/Minipress  Optimize pain control      DVT Prophylaxis:  Xarelto       Patient Centered Rounds:  I have performed bedside rounds and discussed plan of care with nursing today  Discussions with Specialists or Other Care Team Provider:  see above assessments if applicable    Education and Discussions with Family / Patient:  Patient at bedside    Time Spent for Care:  32 minutes  More than 50% of total time spent on counseling and coordination of care as described above  Current Length of Stay: 19 day(s)  Current Patient Status: Inpatient   Certification Statement:  Patient will continue to require additional hospital stay due to assessments as noted above  Code Status: Level 1 - Full Code        Subjective:     Encountered earlier this morning  Other than ongoing pain, denies acute complaints  Objective:     Vitals:   Temp (24hrs), Av °F (36 1 °C), Min:96 6 °F (35 9 °C), Max:97 5 °F (36 4 °C)    Temp:  [96 6 °F (35 9 °C)-97 5 °F (36 4 °C)] 97 5 °F (36 4 °C)  HR:  [103-109] 103  Resp:  [16-17] 16  BP: (100-125)/(62-84) 118/73  SpO2:  [94 %-100 %] 100 %  Body mass index is 53 57 kg/m²  Input and Output Summary (last 24 hours):        Intake/Output Summary (Last 24 hours) at 2023 1510  Last data filed at 2023 1201  Gross per 24 hour   Intake 111 ml   Output 700 ml   Net -589 ml       Physical Exam:     GENERAL  obese - waxing/waning distress from postoperative pain   HEAD   Normocephalic - atraumatic   EYES   PERRL - EOMI    MOUTH   Mucosa moist   NECK   Supple - full range of motion   CARDIAC  intermittently tachycardic - S1/S2 positive   PULMONARY    fairly clear to auscultation but diminished respiratory effort due to body habitus   ABDOMEN   Soft - nontender/nondistended - active bowel sounds   MUSCULOSKELETAL   Motor strength/range of motion deconditioned - s/p left BKA with stump dressed/bandaged   NEUROLOGIC   Alert/oriented at baseline   SKIN   Chronic wrinkles/blemishes other than as mentioned on musculoskeletal exam   PSYCHIATRIC   Mood/affect stable Additional Data:     Labs & Recent Cultures:    Results from last 7 days   Lab Units 04/12/23  0601   WBC Thousand/uL 9 95   HEMOGLOBIN g/dL 8 9*   HEMATOCRIT % 26 8*   PLATELETS Thousands/uL 410*   NEUTROS PCT % 65   LYMPHS PCT % 22   MONOS PCT % 6   EOS PCT % 4     Results from last 7 days   Lab Units 04/09/23  0513   POTASSIUM mmol/L 3 8   CHLORIDE mmol/L 108   CO2 mmol/L 25   BUN mg/dL 15   CREATININE mg/dL 0 59*   CALCIUM mg/dL 8 2*         Results from last 7 days   Lab Units 04/12/23  1051 04/12/23  0722 04/11/23  2126 04/11/23  1606 04/11/23  1118 04/11/23  0717 04/10/23  2054 04/10/23  1631 04/10/23  1147 04/10/23  0837 04/09/23  2204 04/09/23  1626   POC GLUCOSE mg/dl 161* 192* 120 147* 133 162* 231* 180* 142* 165* 170* 284*         Results from last 7 days   Lab Units 04/06/23  0234 04/06/23  0201   LACTIC ACID mmol/L 2 0  --    PROCALCITONIN ng/ml  --  0 06         Results from last 7 days   Lab Units 04/07/23  0516   URINE CULTURE  >100,000 cfu/ml Proteus mirabilis*  80,000-89,000 cfu/ml         Lines/Drains:  Invasive Devices     Peripherally Inserted Central Catheter Line  Duration           PICC Line 47/81/00 Right Basilic 18 days                  Last 24 Hours Medication List:   Current Facility-Administered Medications   Medication Dose Route Frequency Provider Last Rate   • acetaminophen  975 mg Oral Q8H [facility] Anju Valencia MD     • albuterol  2 puff Inhalation Q4H PRN Anju Valencia MD     • aspirin  81 mg Oral Daily Anju Valencia MD     • atorvastatin  40 mg Oral Daily With Noemi Lopez MD     • bisacodyl  10 mg Rectal Daily PRN Anju Valencia MD     • DULoxetine  20 mg Oral Daily Todd Gist, CRNP     • fluticasone-vilanterol  1 puff Inhalation Daily Anju Valencia MD     • gabapentin  300 mg Oral TID Todd Gist, CRNP     • hydrALAZINE  10 mg Intravenous Q6H PRN Anju Valencia MD     • HYDROmorphone  1 mg Intravenous Q4H PRN Ortega Dia MD     • HYDROmorphone  2 mg Oral Q4H PRN EMILE Mccann      Or   • HYDROmorphone  4 mg Oral Q4H PRN EMILE Mccann     • insulin glargine  45 Units Subcutaneous HS Chica Saleh MD     • insulin lispro  18 Units Subcutaneous Daily With Breakfast Chica Saleh MD     • insulin lispro  2-12 Units Subcutaneous TID AC Ortega Dia MD     • insulin lispro  2-12 Units Subcutaneous HS Ortega Dia MD     • insulin lispro  20 Units Subcutaneous Daily With Lunch Chica Saleh MD      And   • insulin lispro  22 Units Subcutaneous Daily With Paulina Casillas MD     • lisinopril  20 mg Oral Daily Aaliyah Agudelo DO     • melatonin  6 mg Oral HS Ortega Dia MD     • methocarbamol  750 mg Oral Q6H [address] EMILE Mccann     • naloxone  0 04 mg Intravenous Q1MIN PRN Ortega Dia MD     • nicotine  21 mg Transdermal Daily Ortega Dia MD     • polyethylene glycol  17 g Oral Daily Ortega Dia MD     • prazosin  1 mg Oral HS Ortega Dia MD     • QUEtiapine  200 mg Oral HS Ortega Dia MD     • rivaroxaban  15 mg Oral BID With Meals DO Laura     • [START ON 4/21/2023] rivaroxaban  20 mg Oral Daily With Breakfast DO Laura     • senna-docusate sodium  2 tablet Oral BID Ortega Dia MD     • simethicone  80 mg Oral Q6H PRN Ortega Dia MD     • topiramate  25 mg Oral BID Ortega Dia MD     • white petrolatum-mineral oil   Topical TID PRN DO Laura                        ** Please Note: This note is constructed using a voice recognition dictation system  An occasional wrong word/phrase or “sound-a-like” substitution may have been picked up by dictation device due to the inherent limitations of voice recognition software    Read the chart carefully and recognize, using reasonable context, where substitutions may have occurred  **

## 2023-04-12 NOTE — PROGRESS NOTES
Progress Note - Acute Pain Service    Misty Skinner 40 y o  female MRN: [id number]  Unit/Bed#: [facility] 904-01 Encounter: [id number]      Assessment:   Principal Problem:    S/P BKA (below knee amputation) unilateral, left ([facility] 75 )  Active Problems:    Essential hypertension    Type 2 diabetes mellitus with diabetic polyneuropathy, with long-term current use of insulin ([facility])    Nicotine dependence    Bipolar disorder ([place] [address] )    PAD (peripheral artery disease) ([facility])    BMI 45 0-49 9, adult ([place] [address] )    Acute respiratory distress    Anemia    Leukocytosis    Misty Skinner is a 40 y o  female with past medical history significant for bipolar disorder, type 2 diabetes, PTSD, peripheral arterial disease status post left SFA PTA/stent placement in August 2021 with subsequent stent thrombectomy on 8/25/2021 requiring left CFA and BK pop bypass in September 2021  She was found to have occluded left FEM BK pop bypass on left lower extremity a gram with no outflow into the foot  She is s/p left AKA on 3/29/2023 for which she received left adductor canal and left popliteal blocks for postoperative analgesia  Acute pain service had initially signed off on 3/30/2023 as patient's pain had been well controlled postoperatively, we were asked to reengage today for ongoing uncontrolled pain  Patient seen and examined at bedside this afternoon, s/p dressing change with general surgery  Patient reports severe left aka stump pain following her dressing change as well as left lower extremity phantom limb pain (ankle and foot)  She reports no improvement in pain with use of as needed oxycodone and has been regularly receiving IV dilaudid for breakthrough pain, along with extra one time doses       Plan:   Discontinue oxycodone 5/10 mg every 4 hours as needed, for mild/severe pain    Opioid rotate to oral Dilaudid:  · Oral Dilaudid 2 mg every 4 hours as needed, for moderate pain  · Oral Dilaudid 4 mg every 4 hours as needed, for severe pain  · IV Dilaudid 1 mg every 4 hours as needed, for breakthrough pain   -Pending improvement in pain control with use of oral Dilaudid would recommend de-escalation of intravenous opioid regimen    Multimodal analgesia:  · Avoid NSAIDs in setting of anticoagulation  · Tylenol 975 mg every 8 hours scheduled  · Increase Gabapentin to 300mg 3 times daily  · Estimated Creatinine Clearance: 167 1 mL/min (A) (by C-G formula based on SCr of 0 59 mg/dL (L))  · Increase Robaxin to 750 mg every 6 hours scheduled  · Start Cymbalta 20 mg daily for phantom limb pain    Bowel Regimen:  · Senna-s 2 tabs BID  · Miralax daily    Treatment plan and recommendations discussed with bedside nursing staff as well as primary care service  APS will continue to follow  Please contact Acute Pain Service - [facility] via [employer] from [phone] with additional questions or concerns  See Patrick or Sivan for additional contacts and after hours information  Pain History  Current pain location(s): Left AKA stump  Pain Scale:   10  Quality: Sharp, shooting  24 hour history: No acute events in past 24 hours  She remains hemodynamically stable aside from some mild tachycardia  Patient seen at bedside this afternoon s/p left AKA dressing change with general surgery  Reports minimal pain relief with use of as needed oxycodone, has been relying on IV Dilaudid for breakthrough pain  Tolerating current regimen no opioid-induced side effects including respiratory depression, shortness of breath, nausea/vomiting, constipation, pruritus      Opioid requirement previous 24 hours:   40 mg oxycodone  5 5 mg IV Dilaudid    Meds/Allergies   all current active meds have been reviewed, current meds:   Current Facility-Administered Medications   Medication Dose Route Frequency   • acetaminophen (TYLENOL) tablet 975 mg  975 mg Oral Q8H [address]   • albuterol (PROVENTIL HFA,VENTOLIN HFA) inhaler 2 puff  2 puff Inhalation Q4H PRN   • aspirin chewable tablet 81 mg  81 mg Oral Daily   • atorvastatin (LIPITOR) tablet 40 mg  40 mg Oral Daily With Dinner   • bisacodyl (DULCOLAX) rectal suppository 10 mg  10 mg Rectal Daily PRN   • DULoxetine (CYMBALTA) delayed release capsule 20 mg  20 mg Oral Daily   • fluticasone-vilanterol 200-25 mcg/actuation 1 puff  1 puff Inhalation Daily   • gabapentin (NEURONTIN) capsule 300 mg  300 mg Oral TID   • hydrALAZINE (APRESOLINE) injection 10 mg  10 mg Intravenous Q6H PRN   • HYDROmorphone (DILAUDID) injection 1 mg  1 mg Intravenous Q4H PRN   • HYDROmorphone (DILAUDID) tablet 2 mg  2 mg Oral Q4H PRN    Or   • HYDROmorphone (DILAUDID) tablet 4 mg  4 mg Oral Q4H PRN   • insulin glargine (LANTUS) subcutaneous injection 45 Units 0 45 mL  45 Units Subcutaneous HS   • insulin lispro (HumaLOG) 100 units/mL subcutaneous injection 18 Units  18 Units Subcutaneous Daily With Breakfast   • insulin lispro (HumaLOG) 100 units/mL subcutaneous injection 2-12 Units  2-12 Units Subcutaneous TID AC   • insulin lispro (HumaLOG) 100 units/mL subcutaneous injection 2-12 Units  2-12 Units Subcutaneous HS   • insulin lispro (HumaLOG) 100 units/mL subcutaneous injection 20 Units  20 Units Subcutaneous Daily With Lunch    And   • insulin lispro (HumaLOG) 100 units/mL subcutaneous injection 22 Units  22 Units Subcutaneous Daily With Dinner   • lisinopril (ZESTRIL) tablet 20 mg  20 mg Oral Daily   • melatonin tablet 6 mg  6 mg Oral HS   • methocarbamol (ROBAXIN) tablet 750 mg  750 mg Oral Q6H ABDULKADIR   • naloxone (NARCAN) 0 04 mg/mL syringe 0 04 mg  0 04 mg Intravenous Q1MIN PRN   • nicotine (NICODERM CQ) 21 mg/24 hr TD 24 hr patch 21 mg  21 mg Transdermal Daily   • polyethylene glycol (MIRALAX) packet 17 g  17 g Oral Daily   • prazosin (MINIPRESS) capsule 1 mg  1 mg Oral HS   • QUEtiapine (SEROquel) tablet 200 mg  200 mg Oral HS   • rivaroxaban (XARELTO) tablet 15 mg  15 mg Oral BID With Meals   • [START ON 4/21/2023] rivaroxaban (XARELTO) tablet 20 mg  20 mg Oral Daily With Breakfast   • senna-docusate sodium (SENOKOT S) 8 6-50 mg per tablet 2 tablet  2 tablet Oral BID   • simethicone (MYLICON) chewable tablet 80 mg  80 mg Oral Q6H PRN   • topiramate (TOPAMAX) tablet 25 mg  25 mg Oral BID   • white petrolatum-mineral oil (EUCERIN,HYDROCERIN) cream   Topical TID PRN    and PTA meds:   Prior to Admission Medications   Prescriptions Last Dose Informant Patient Reported? Taking? Advair -21 MCG/ACT inhaler 3/17/2023  No Yes   Sig: INHALE 2 PUFFS BY MOUTH TWICE DAILY   RINSE MOUTH AFTER USE   Alcohol Swabs (Pharmacist Choice Alcohol) PADS   No No   Sig: USE 3-4 TIMES AS DIRECTED     Blood Glucose Monitoring Suppl (True Metrix Air Glucose Meter) w/Device KIT   No No   Sig: use as directed   Patient not taking: No sig reported   Blood Pressure Monitoring (Blood Pressure Monitor Automat) KATLYN   No No   Sig: Use Daily as Directed   Patient not taking: No sig reported   Diclofenac Sodium (VOLTAREN) 1 % 3/22/2023  No No   Sig: APPLY 2 G TOPICALLY 4 (FOUR) TIMES A DAY   Patient not taking: Reported on 6/27/2022   Global Inject Ease Lancets 30G MISC   No No   Sig: USE 3 (THREE) TIMES A DAY   Patient not taking: Reported on 9/12/2022   Insulin Glargine Solostar (Lantus SoloStar) 100 UNIT/ML SOPN 3/24/2023 at 0700  No Yes   Sig: Inject 0 4 mL (40 Units total) under the skin daily Inject 20 units in the morning and 20 units before bed daily   Insulin Pen Needle (Comfort EZ Pen Needles) 32G X 4 MM MISC   No No   Sig: Inject under the skin 4 (four) times a day (before meals and at bedtime)   Lancet Devices (Lancing Device) MISC   No No   Sig: USE AS DIRECTED   OneTouch Ultra test strip   No No   Sig: USE 2-3 TIMES A DAY   QUEtiapine (SEROquel) 200 mg tablet 3/17/2023  Yes Yes   Sig: Take by mouth daily at bedtime   Trulicity 1 5 BL/1 4IB injection   No No   Sig: AS DIRECTED   albuterol (PROVENTIL HFA,VENTOLIN HFA) 90 mcg/act inhaler 3/24/2023 at 1000  No Yes   Sig: Inhale 2 puffs every 4 (four) "hours as needed for wheezing or shortness of breath   atorvastatin (LIPITOR) 40 mg tablet 3/17/2023  Yes No   Patient not taking: Reported on 6/27/2022   hydrOXYzine HCL (ATARAX) 50 mg tablet 3/10/2023  Yes Yes   Sig: Take 50 mg by mouth 3 (three) times a day   insulin lispro (HumaLOG) 100 units/mL injection   No No   Sig: INJECT 3 UNITS UNDER THE SKIN 2 (TWO) TIMES A DAY WITH MEALS \"DISCARD AFTER 28 DAYS\"   lidocaine (XYLOCAINE) 5 % ointment 3/22/2023  Yes Yes   Sig: Apply 1 application  topically 3 (three) times a day   lisinopril (ZESTRIL) 20 mg tablet   No No   Sig: TAKE 1 TABLET (20 MG TOTAL) BY MOUTH DAILY   metFORMIN (GLUCOPHAGE) 1000 MG tablet   No No   Sig: TAKE ONE (1) TABLET BY MOUTH TWICE DAILY WITH FOOD   nicotine (NICODERM CQ) 21 mg/24 hr TD 24 hr patch   No No   Sig: Place 1 patch on the skin daily   Patient not taking: Reported on 6/27/2022   prazosin (MINIPRESS) 1 mg capsule 3/17/2023  Yes Yes   Sig: Take 1 mg by mouth daily at bedtime   rivaroxaban (Xarelto) 2 5 mg tablet 3/20/2023  No Yes   Sig: Take 1 tablet (2 5 mg total) by mouth 2 (two) times a day   topiramate (TOPAMAX) 25 mg tablet 3/19/2023  Yes Yes   Sig: TAKE 1 TABLET TWICE DAILY X7DAYS,THEN 1 TAB THREE TIMES DAILY X7DAYS, THEN 2 TABS TWICE DAILY      Facility-Administered Medications Last Administration Doses Remaining   lidocaine (LMX) 4 % cream None recorded           No Known Allergies    Objective     Temp:  [96 6 °F (35 9 °C)-97 5 °F (36 4 °C)] 97 5 °F (36 4 °C)  HR:  [103-109] 103  Resp:  [16-17] 16  BP: (100-125)/(62-84) 118/73    Physical Exam  Vitals reviewed  Constitutional:       General: She is in acute distress (secondary to pain)  Appearance: She is obese  She is not ill-appearing or diaphoretic  HENT:      Head: Normocephalic and atraumatic  Cardiovascular:      Rate and Rhythm: Tachycardia present  Pulmonary:      Effort: Pulmonary effort is normal  No tachypnea, bradypnea or respiratory distress        Breath " sounds: No wheezing  Abdominal:      General: There is no distension  Palpations: Abdomen is soft  There is no mass  Tenderness: There is no abdominal tenderness  Musculoskeletal:         General: Tenderness (L aka stump) present  No swelling or signs of injury  Normal range of motion  Cervical back: Normal range of motion  Skin:     General: Skin is warm and dry  Coloration: Skin is not pale  Findings: No rash  Neurological:      Mental Status: She is alert and oriented to person, place, and time  Mental status is at baseline  Sensory: Sensory deficit (Phantom limb pain/sensation LLE) present  Motor: Motor function is intact  No weakness or tremor  Psychiatric:         Attention and Perception: Attention normal          Mood and Affect: Mood normal          Speech: Speech normal          Behavior: Behavior normal  Behavior is cooperative  Lab Results:   Results from last 7 days   Lab Units 04/12/23  0601   WBC Thousand/uL 9 95   HEMOGLOBIN g/dL 8 9*   HEMATOCRIT % 26 8*   PLATELETS Thousands/uL 410*      Results from last 7 days   Lab Units 04/09/23  0513   POTASSIUM mmol/L 3 8   CHLORIDE mmol/L 108   CO2 mmol/L 25   BUN mg/dL 15   CREATININE mg/dL 0 59*   CALCIUM mg/dL 8 2*       Imaging Studies: I have personally reviewed pertinent reports  Please note that the APS provides consultative services regarding pain management only  With the exception of ketamine and epidural infusions and except when indicated, final decisions regarding starting or changing doses of analgesic medications are at the discretion of the consulting service  Off hours consultation and/or medication management is generally not available      EMILE Veras  Acute Pain Service

## 2023-04-12 NOTE — PLAN OF CARE
Problem: OCCUPATIONAL THERAPY ADULT  Goal: Performs self-care activities at highest level of function for planned discharge setting  See evaluation for individualized goals  Description: Treatment Interventions: ADL retraining, Functional transfer training, UE strengthening/ROM, Endurance training, Patient/family training, Compensatory technique education, Continued evaluation, Energy conservation, Activityengagement          See flowsheet documentation for full assessment, interventions and recommendations  Outcome: Progressing  Note: Limitation: Decreased ADL status, Decreased UE strength, Decreased Safe judgement during ADL, Decreased endurance, Decreased self-care trans, Decreased high-level ADLs  Prognosis: Good  Assessment: Patient participated in Skilled OT session this date with interventions consisting of ADL re training with the use of correct body mechnaics, Energy Conservation techniques, safety awareness and fall prevention techniques,  therapeutic activities to: increase activity tolerance, increase dynamic sit/ stand balance during functional activity  and increase OOB/ sitting tolerance   Upon arrival patient was found supine in bed  Pt demonstrated the following tasks: S sup to sit, STS with RW, BSC transfer  Pt performs hops, SPT, and sit pivot with CGA  MIN A for LBD and toileting  Patient continues to be functioning below baseline level, occupational performance remains limited secondary to factors listed above and increased risk for falls and injury  From OT standpoint, recommendation at time of d/c would be home with skilled therapy  Patient to benefit from continued Occupational Therapy treatment while in the hospital to address deficits as defined above and maximize level of functional independence with ADLs and functional mobility  Pt was left after session with all current needs met  The patient's raw score on the AM-PAC Daily Activity Inpatient Short Form is 21   A raw score of greater than or equal to 19 suggests the patient may benefit from discharge to home  Please refer to the recommendation of the Occupational Therapist for safe discharge planning       OT Discharge Recommendation: Home with home health rehabilitation

## 2023-04-12 NOTE — PLAN OF CARE
Problem: MOBILITY - ADULT  Goal: Maintain or return to baseline ADL function  Description: INTERVENTIONS:  -  Assess patient's ability to carry out ADLs; assess patient's baseline for ADL function and identify physical deficits which impact ability to perform ADLs (bathing, care of mouth/teeth, toileting, grooming, dressing, etc )  - Assess/evaluate cause of self-care deficits   - Assess range of motion  - Assess patient's mobility; develop plan if impaired  - Assess patient's need for assistive devices and provide as appropriate  - Encourage maximum independence but intervene and supervise when necessary  - Involve family in performance of ADLs  - Assess for home care needs following discharge   - Consider OT consult to assist with ADL evaluation and planning for discharge  - Provide patient education as appropriate  Outcome: Progressing     Problem: PAIN - ADULT  Goal: Verbalizes/displays adequate comfort level or baseline comfort level  Description: Interventions:  - Encourage patient to monitor pain and request assistance  - Assess pain using appropriate pain scale  - Administer analgesics based on type and severity of pain and evaluate response  - Implement non-pharmacological measures as appropriate and evaluate response  - Consider cultural and social influences on pain and pain management  - Notify physician/advanced practitioner if interventions unsuccessful or patient reports new pain  Outcome: Not Progressing

## 2023-04-12 NOTE — PROGRESS NOTES
Wound check and dressing change completed  Indigo Mac reports some continued pain, especially phantom pains  No discharge or erythema upon inspection  Some tenderness along middle posterior aspect  No induration or fluctuance noted  Wound is well approximated with sutures and staples  Betadine paint applied vincent-incisionally, abdominal pads applied, and leg wrapped with ace bandages  Continue with daily as needed dressing changes and ace wraps  We will continue to see weekly for stump checks while she remains inpatient

## 2023-04-12 NOTE — ARC ADMISSION
Received referral on patient for possible ARC placement  Upon review of patient’s case with [facility] physician, patient deemed not ARC appropriate at this time  Lower level of care/therapies recommended  CM made aware    Thank you for your referral,  [address] Admissions

## 2023-04-13 PROBLEM — D62 POSTOPERATIVE ANEMIA DUE TO ACUTE BLOOD LOSS: Status: ACTIVE | Noted: 2023-04-05

## 2023-04-13 PROBLEM — E66.01 MORBID OBESITY (HCC): Status: ACTIVE | Noted: 2021-07-23

## 2023-04-13 NOTE — PLAN OF CARE
Problem: PHYSICAL THERAPY ADULT  Goal: Performs mobility at highest level of function for planned discharge setting  See evaluation for individualized goals  Description: Treatment/Interventions: OT, Spoke to case management, Spoke to nursing, Gait training, Bed mobility, Patient/family training, Endurance training, Functional transfer training, LE strengthening/ROM  Equipment Recommended: Wheelchair, Teodoro Eubanks       See flowsheet documentation for full assessment, interventions and recommendations  Outcome: Progressing  Note: Prognosis: Good  Problem List: Decreased strength, Decreased range of motion, Decreased endurance, Impaired balance, Decreased mobility, Decreased safety awareness, Pain, Orthopedic restrictions  Assessment: Pt seen for PT treatment session this date  Therapy session focused on gait training and transfer training in order to improve overall mobility and independence  Pt requires supervision assist for STS transfers and bed mobility  Increased assistance required for SPT 2* increased fatigue following ambulation trial  Pt instructed in ambulation using hopping technique to improve functional mobility and independence for DC  Pt increased ambulation distance with CGA  Ambulation limited 2* SOB, pain and RLE fatigue  Pt expressed goal to increase walking distance  Educated pt on chair push ups and heel raises exercises to improve UE/ LE strength for ambulation  Plan to continue practicing ambulation and WC mobility in next session- spoke with CM to order pt's WC in order to practice with therapy  UE propulsion of hospital WC limited 2* increased protrouding abdomen limiting forward flexion  Pt would benefit from lowered WC seat to propel with RLE and BL UEs  Pt making good progress toward goals  Pt was left sitting in chair with call bell at the end of PT session with all needs in reach  Pt would benefit from continued PT services while in hospital to address remaining limitations   PT to continue treating pt and recommends home with home health PT  The patient's AM-PAC Basic Mobility Inpatient Short Form Raw Score is 16  A Raw score of less than or equal to 16 suggests the patient may benefit from discharge to post-acute rehabilitation services  Please also refer to the recommendation of the Physical Therapist for safe discharge planning  Barriers to Discharge: Inaccessible home environment (per pt unable to fit WC in motel bathroom)     PT Discharge Recommendation: Home with home health rehabilitation (At [facility] level with appropriate level of assist from spouse)    See flowsheet documentation for full assessment

## 2023-04-13 NOTE — PLAN OF CARE
Problem: MOBILITY - ADULT  Goal: Maintain or return to baseline ADL function  Description: INTERVENTIONS:  -  Assess patient's ability to carry out ADLs; assess patient's baseline for ADL function and identify physical deficits which impact ability to perform ADLs (bathing, care of mouth/teeth, toileting, grooming, dressing, etc )  - Assess/evaluate cause of self-care deficits   - Assess range of motion  - Assess patient's mobility; develop plan if impaired  - Assess patient's need for assistive devices and provide as appropriate  - Encourage maximum independence but intervene and supervise when necessary  - Involve family in performance of ADLs  - Assess for home care needs following discharge   - Consider OT consult to assist with ADL evaluation and planning for discharge  - Provide patient education as appropriate  4/13/2023 0143 by Michelle Pearce  Outcome: Progressing    Goal: Maintains/Returns to pre admission functional level  Description: INTERVENTIONS:  - Perform BMAT or MOVE assessment daily    - Set and communicate daily mobility goal to care team and patient/family/caregiver     - Collaborate with rehabilitation services on mobility goals if consulted  4/13/2023 0143 by Michelle Pearce  Outcome: Progressing       Problem: Prexisting or High Potential for Compromised Skin Integrity  Goal: Skin integrity is maintained or improved  Description: INTERVENTIONS:  - Identify patients at risk for skin breakdown  - Assess and monitor skin integrity  - Assess and monitor nutrition and hydration status  - Monitor labs   - Assess for incontinence   - Turn and reposition patient  - Assist with mobility/ambulation  - Relieve pressure over bony prominences  - Avoid friction and shearing  - Provide appropriate hygiene as needed including keeping skin clean and dry  - Evaluate need for skin moisturizer/barrier cream  - Collaborate with interdisciplinary team   - Patient/family teaching  - Consider wound care consult 4/13/2023 0143 by Joan Serrano  Outcome: Progressing       Problem: Nutrition/Hydration-ADULT  Goal: Nutrient/Hydration intake appropriate for improving, restoring or maintaining nutritional needs  Description: Monitor and assess patient's nutrition/hydration status for malnutrition  Collaborate with interdisciplinary team and initiate plan and interventions as ordered  Monitor patient's weight and dietary intake as ordered or per policy  Utilize nutrition screening tool and intervene as necessary  Determine patient's food preferences and provide high-protein, high-caloric foods as appropriate       INTERVENTIONS:  - Monitor oral intake, urinary output, labs, and treatment plans  - Assess nutrition and hydration status and recommend course of action  - Evaluate amount of meals eaten  - Assist patient with eating if necessary   - Allow adequate time for meals  - Recommend/ encourage appropriate diets, oral nutritional supplements, and vitamin/mineral supplements  - Order, calculate, and assess calorie counts as needed  - Recommend, monitor, and adjust tube feedings and TPN/PPN based on assessed needs  - Assess need for intravenous fluids  - Provide specific nutrition/hydration education as appropriate  - Include patient/family/caregiver in decisions related to nutrition  4/13/2023 0143 by Joan Serrano  Outcome: Progressing       Problem: SAFETY,RESTRAINT: NV/NON-SELF DESTRUCTIVE BEHAVIOR  Goal: Remains free of harm/injury (restraint for non violent/non self-detsructive behavior)  Description: INTERVENTIONS:  - Instruct patient/family regarding restraint use   - Assess and monitor physiologic and psychological status   - Provide interventions and comfort measures to meet assessed patient needs   - Identify and implement measures to help patient regain control  - Assess readiness for release of restraint   4/13/2023 0143 by Joan Serrano  Outcome: Progressing    Goal: Returns to optimal restraint-free functioning  Description: INTERVENTIONS:  - Assess the patient's behavior and symptoms that indicate continued need for restraint  - Identify and implement measures to help patient regain control  - Assess readiness for release of restraint   4/13/2023 0143 by Avelino Hodgkin  Outcome: Progressing       Problem: PAIN - ADULT  Goal: Verbalizes/displays adequate comfort level or baseline comfort level  Description: Interventions:  - Encourage patient to monitor pain and request assistance  - Assess pain using appropriate pain scale  - Administer analgesics based on type and severity of pain and evaluate response  - Implement non-pharmacological measures as appropriate and evaluate response  - Consider cultural and social influences on pain and pain management  - Notify physician/advanced practitioner if interventions unsuccessful or patient reports new pain  4/13/2023 0143 by Avelino Hodgkin  Outcome: Progressing

## 2023-04-13 NOTE — PROGRESS NOTES
[facility]  Progress Note  Name: Misty Skinner I  MRN: [id number]  [facility] Date of Admission: 3/24/2023   Date of Service: 4/13/2023 I Hospital Day: 21       * S/p left BKA (below knee amputation)   Assessment & Plan  S/p left BKA by vascular surgery on 3/24  Continue ASA/statin in the setting of underlying/predisposing PAD  Appreciate pain management input regarding analgesic optimization/titration  Ongoing PT/OT evaluations  Current homeless status is an obstacle to discharge    PAD (peripheral artery disease)  Assessment & Plan  Hx of occluded bypass and profunda thrombus  Continue ASA/statin/Xarelto  Appreciate vascular surgery input  Appreciate hematology input -> thrombophilia evaluation as outpatient as deemed no need to obtain here as has been already on anticoagulation, hence, likely to skew results    Leukocytosis  Assessment & Plan  Likely reactive due to primary issue and recently treated UTI  WBC count normalized    Postoperative blood loss anemia  Assessment & Plan  Hemoglobin currently stable at 8 5 -> transfuse if drops < 7 0    Morbid obesity  Assessment & Plan  BMI of 53 57 currently  Lifestyle/diet modifications    Bipolar disorder  Assessment & Plan  Continue Seroquel/Topamax  Appreciate psychiatry input    Nicotine dependence  Assessment & Plan  Cessation counseling  Transdermal nicotine patch on board    Insulin-dependent diabetes mellitus with neuropathy  Assessment & Plan  Lab Results   Component Value Date    HGBA1C 9 8 (A) 12/20/2022     Appreciate endocrinology input  Continue basal/prandial insulin with additional SSI coverage per Accu-Cheks  On Gabapentin for neuropathy  History of poor dietary compliance noted  Hypoglycemia protocol   Carbohydrate restriction enforced    Essential hypertension  Assessment & Plan  Continue Zestril/Minipress  Optimize pain control      DVT Prophylaxis:  Xarelto    Patient Centered Rounds:   I have performed bedside rounds and discussed plan of care with nursing today  Discussions with Specialists or Other Care Team Provider:  see above assessments if applicable    Education and Discussions with Family / Patient:  Patient at bedside, who will self-update spouse    Time Spent for Care:  32 minutes  More than 50% of total time spent on counseling and coordination of care as described above  Current Length of Stay: 20 day(s)  Current Patient Status: Inpatient   Certification Statement:  Patient will continue to require additional hospital stay due to assessments as noted above  Code Status: Level 1 - Full Code        Subjective:     Encountered earlier in the day  Complains of white/waning and intermittently uncontrolled pain  Objective:     Vitals:   Temp (24hrs), Av 4 °F (36 3 °C), Min:97 °F (36 1 °C), Max:97 7 °F (36 5 °C)    Temp:  [97 °F (36 1 °C)-97 7 °F (36 5 °C)] 97 °F (36 1 °C)  HR:  [] 103  Resp:  [14-17] 14  BP: (100-125)/(60-80) 113/70  SpO2:  [94 %-98 %] 94 %  Body mass index is 53 57 kg/m²  Input and Output Summary (last 24 hours):        Intake/Output Summary (Last 24 hours) at 2023 1824  Last data filed at 2023 1742  Gross per 24 hour   Intake 360 ml   Output 225 ml   Net 135 ml       Physical Exam:     GENERAL  waxing/waning postoperative pain - obese   HEAD   Normocephalic - atraumatic   EYES   PERRL - EOMI    MOUTH   Mucosa moist   NECK   Supple - full range of motion   CARDIAC  mildly tachycardic   PULMONARY  nonlabored respirations at rest   ABDOMEN   Soft - nontender/nondistended - active bowel sounds   MUSCULOSKELETAL   Motor strength/range of motion deconditioned - LLE stump dressed/bandaged   NEUROLOGIC   Alert/oriented at baseline   SKIN   Chronic wrinkles/blemishes other than as mentioned on musculoskeletal exam   PSYCHIATRIC   Mood/affect stable         Additional Data:     Labs & Recent Cultures:    Results from last 7 days   Lab Units 04/13/23  0552   WBC Thousand/uL 8 08   HEMOGLOBIN g/dL 8 5*   HEMATOCRIT % 25 4*   PLATELETS Thousands/uL 385   NEUTROS PCT % 63   LYMPHS PCT % 24   MONOS PCT % 6   EOS PCT % 5     Results from last 7 days   Lab Units 04/09/23  0513   POTASSIUM mmol/L 3 8   CHLORIDE mmol/L 108   CO2 mmol/L 25   BUN mg/dL 15   CREATININE mg/dL 0 59*   CALCIUM mg/dL 8 2*         Results from last 7 days   Lab Units 04/13/23  1627 04/13/23  1125 04/13/23  0722 04/12/23  2115 04/12/23  1922 04/12/23  1608 04/12/23  1051 04/12/23  0722 04/11/23  2126 04/11/23  1606 04/11/23  1118 04/11/23  0717   POC GLUCOSE mg/dl 91 177* 170* 121 130 97 161* 192* 120 147* 133 162*                 Results from last 7 days   Lab Units 04/07/23  0516   URINE CULTURE  >100,000 cfu/ml Proteus mirabilis*  80,000-89,000 cfu/ml         Lines/Drains:  Invasive Devices     Peripherally Inserted Central Catheter Line  Duration           PICC Line 88/78/13 Right Basilic 19 days                  Last 24 Hours Medication List:   Current Facility-Administered Medications   Medication Dose Route Frequency Provider Last Rate   • acetaminophen  975 mg Oral Q8H [facility] Racheal Valdivia MD     • albuterol  2 puff Inhalation Q4H PRN Racheal Valdivia MD     • aspirin  81 mg Oral Daily Racheal Valdivia MD     • atorvastatin  40 mg Oral Daily With Travis De Santiago MD     • bisacodyl  10 mg Rectal Daily PRN Racheal Valdivia MD     • DULoxetine  20 mg Oral Daily Mona Starkermit, CRNP     • fluticasone-vilanterol  1 puff Inhalation Daily Racheal Valdivia MD     • gabapentin  300 mg Oral TID Mona Enoch, CRNP     • hydrALAZINE  10 mg Intravenous Q6H PRN Racheal Valdivia MD     • HYDROmorphone  0 5 mg Intravenous Q6H PRN Mona Starkermit, CRNP     • HYDROmorphone  2 mg Oral Q4H PRN Mona Starkermit, CRCARMEN      Or   • HYDROmorphone  4 mg Oral Q4H PRN Mona Starkermit, CRNP     • insulin glargine  45 Units Subcutaneous HS [address] Andrea MD     • [START ON 4/14/2023] insulin lispro  20 Units Subcutaneous Daily With Lunch Belgica Zepeda MD      And   • insulin lispro  12 Units Subcutaneous Daily With Mary Tony MD     • insulin lispro  18 Units Subcutaneous Daily With Breakfast [address] Andrea MD     • insulin lispro  2-12 Units Subcutaneous TID AC Aleksey Senior MD     • insulin lispro  2-12 Units Subcutaneous HS Aleksey Senior MD     • lisinopril  20 mg Oral Daily Aaliyah Agudelo DO     • melatonin  6 mg Oral HS Aleksey Senior MD     • methocarbamol  750 mg Oral Q6H [facility] EMILE Puri     • naloxone  0 04 mg Intravenous Q1MIN PRN Aleksey Senior MD     • nicotine  21 mg Transdermal Daily Aleksey Senior MD     • polyethylene glycol  17 g Oral Daily Aleksey Senior MD     • prazosin  1 mg Oral HS Aleksey Senior MD     • QUEtiapine  200 mg Oral HS Aleksey Senior MD     • rivaroxaban  15 mg Oral BID With Meals Gino Cline DO     • [START ON 4/21/2023] rivaroxaban  20 mg Oral Daily With Breakfast Gino Cline DO     • senna-docusate sodium  2 tablet Oral BID Aleksey Senior MD     • simethicone  80 mg Oral Q6H PRN Aleksey Senior MD     • topiramate  25 mg Oral BID Aleksey Senior MD     • white petrolatum-mineral oil   Topical TID PRN Gino Cline DO                        ** Please Note: This note is constructed using a voice recognition dictation system  An occasional wrong word/phrase or “sound-a-like” substitution may have been picked up by dictation device due to the inherent limitations of voice recognition software  Read the chart carefully and recognize, using reasonable context, where substitutions may have occurred  **

## 2023-04-13 NOTE — CASE MANAGEMENT
Case Management Discharge Planning Note    Patient name Dulce [facility] MRN [id number]  : 1985 Date 2023       Current Admission Date: 3/24/2023  Current Admission Diagnosis:S/P BKA (below knee amputation) unilateral, left [facility])   Patient Active Problem List    Diagnosis Date Noted   • Pyuria 2023   • Anemia 2023   • Leukocytosis 2023   • Acute respiratory distress 2023   • Class 3 severe obesity due to excess calories without serious comorbidity with body mass index (BMI) of 40 0 to 44 9 in adult [facility]) 2022   • Surgical wound breakdown, sequela 10/28/2021   • Positive D dimer 10/02/2021   • Atherosclerosis of left leg ([facility] ) 2021   • S/P BKA (below knee amputation) unilateral, left ([place] [address] ) 2021   • Bursitis of left knee 2021   • BMI 45 0-49 9, adult ([place] [address] ) 2021   • PAD (peripheral artery disease) ([place] [address] ) 2021   • Diarrhea 2021   • Bilateral leg pain 2021   • Leg pain 2020   • Headache 2020   • Essential hypertension 2020   • Type 2 diabetes mellitus with diabetic polyneuropathy, with long-term current use of insulin ([place] [address] ) 2020   • Paresthesia 2020   • COPD (chronic obstructive pulmonary disease) ([place] [address] ) 2020   • Nicotine dependence 2020   • Bipolar disorder ([place] [address] ) 2020      LOS (days): 20  Geometric Mean LOS (GMLOS) (days): 4 30  Days to GMLOS:-15 8     OBJECTIVE:  Risk of Unplanned Readmission Score: 24 9         Current admission status: Inpatient   Preferred Pharmacy:   [address] [place] [id number]  Phone: [phone] Fax: [phone]    [facility], [address] [place] [address]  [address] [place] [address] [address]  Phone: [phone] Fax: [phone]    [address] [place], [address] [facility] [place]  [place] [address]  Phone: [phone] Fax: [phone]    Primary Care Provider: Jamel Dang MD    Primary Insurance: [facility]  Secondary Insurance: [address]    DISCHARGE DETAILS:    PT recommending WC for patient  This will be used for training as part of DCP    PT received [facility] 9/21    Notified pt we will be ordering WC for her      She provided [address] [place] [address] [phone]

## 2023-04-13 NOTE — PHYSICAL THERAPY NOTE
PHYSICAL THERAPY NOTE      Patient Name: Yolanda SALAZAR Date: 4/13/2023 04/13/23 1335   PT Last Visit   PT Visit Date 04/13/23   Pain Assessment   Pain Assessment Tool 0-10   Pain Score 10 - Worst Possible Pain   Pain Location/Orientation Orientation: Left; Location: Leg   Hospital Pain Intervention(s) Ambulation/increased activity;Repositioned   Restrictions/Precautions   Weight Bearing Precautions Per Order Yes   LLE Weight Bearing Per Order NWB  (s/p L AKA)   Other Precautions Fall Risk;Pain   General   Family/Caregiver Present No   Cognition   Overall Cognitive Status WFL   Attention Within functional limits   Orientation Level Oriented X4   Memory Within functional limits   Following Commands Follows one step commands without difficulty   Comments pt pleasant and cooperative t/o PT session   Bed Mobility   Supine to Sit 5  Supervision   Additional items HOB elevated; Increased time required   Sit to Supine 5  Supervision   Additional items Increased time required   Additional Comments Upon arrival pt supine in bed  Pt seated in chair with call bell and all needs within reach following PT session   Transfers   Sit to Stand 5  Supervision   Additional items Increased time required   Stand to Sit 5  Supervision   Additional items Increased time required   Additional items Assist x 1; Increased time required;Verbal cues  (CGA 2* increased fatigue following ambulation trial)   Additional Comments Used RW for all functional transfers   Ambulation/Elevation   Gait pattern Decreased foot clearance; Forward Flexion; Short stride  (Hopping technique)   Gait Assistance   (CGA)   Additional items Assist x 1;Verbal cues   Assistive Device Rolling walker   Distance 30'   Ambulation/Elevation Additional Comments Ambulation limited 2* SOB and increased LLE pain with activity   Balance   Static Sitting Good   Dynamic Sitting Fair +   Static Standing Fair -   Dynamic Standing Fair -   Ambulatory Poor +   Endurance Deficit   Endurance Deficit Yes   Endurance Deficit Description SOB with upright activity, pain, fatigue   Activity Tolerance   Activity Tolerance Patient limited by fatigue;Patient limited by pain   Nurse Made Aware RN cleared pt for PT session   Assessment   Prognosis Good   Problem List Decreased strength;Decreased range of motion;Decreased endurance; Impaired balance;Decreased mobility; Decreased safety awareness;Pain;Orthopedic restrictions   Assessment Pt seen for PT treatment session this date  Therapy session focused on gait training and transfer training in order to improve overall mobility and independence  Pt requires supervision assist for STS transfers and bed mobility  Increased assistance required for SPT 2* increased fatigue following ambulation trial  Pt instructed in ambulation using hopping technique to improve functional mobility and independence for DC  Pt increased ambulation distance with CGA  Ambulation limited 2* SOB, pain and RLE fatigue  Pt expressed goal to increase walking distance  Educated pt on chair push ups and heel raises exercises to improve UE/ LE strength for ambulation  Plan to continue practicing ambulation and WC mobility in next session- spoke with CM to order pt's WC in order to practice with therapy  UE propulsion of hospital WC limited 2* increased protrouding abdomen limiting forward flexion  Pt would benefit from lowered WC seat to propel with RLE and BL UEs  Pt making good progress toward goals  Pt was left sitting in chair with call bell at the end of PT session with all needs in reach  Pt would benefit from continued PT services while in hospital to address remaining limitations  PT to continue treating pt and recommends home with home health PT  The patient's AM-PAC Basic Mobility Inpatient Short Form Raw Score is 16   A Raw score of less than or equal to 16 suggests the patient may benefit from discharge to post-acute rehabilitation services  Please also refer to the recommendation of the Physical Therapist for safe discharge planning  Barriers to Discharge Inaccessible home environment  (per pt unable to fit WC in motel bathroom)   Goals   Patient Goals to walk   STG Expiration Date 04/25/23   PT Treatment Day 4   Plan   Treatment/Interventions Functional transfer training;LE strengthening/ROM; Therapeutic exercise; Endurance training;Patient/family training;Equipment eval/education; Bed mobility;Gait training;Spoke to nursing   PT Frequency 3-5x/wk   Recommendation   PT Discharge Recommendation Home with home health rehabilitation  (At [facility] level with appropriate level of assist from spouse)   Equipment Recommended Wheelchair;Walker   Wheelchair Package Recommended Heavy Duty (pt wt 250 lbs+)   Change or Add item to Wheelchair Package? Yes, Change Item   What would you like to CHANGE? Wider Seat Width; Lower Seat Height (Marko/Low Seat)   W/C Seat Width 30 inch   Walker Package Recommended Wheeled walker   AM-PAC Basic Mobility Inpatient   Turning in Flat Bed Without Bedrails 3   Lying on Back to Sitting on Edge of Flat Bed Without Bedrails 3   Moving Bed to Chair 3   Standing Up From Chair Using Arms 3   Walk in Room 3   Climb 3-5 Stairs With Railing 1   Basic Mobility Inpatient Raw Score 16   Basic Mobility Standardized Score 38 32   Highest Level Of Mobility   JH-HLM Goal 5: Stand one or more mins   JH-HLM Achieved 7: Walk 25 feet or more   End of Consult   Patient Position at End of Consult Bedside chair; All needs within reach     Tanvir MCKEON  04/13/23  3:30 PM

## 2023-04-13 NOTE — ASSESSMENT & PLAN NOTE
Lab Results   Component Value Date    HGBA1C 9 8 (A) 12/20/2022     Appreciate endocrinology input  Continue basal/prandial insulin with additional SSI coverage per Accu-Cheks  On Gabapentin for neuropathy  History of poor dietary compliance noted  Hypoglycemia protocol   Carbohydrate restriction enforced

## 2023-04-13 NOTE — ASSESSMENT & PLAN NOTE
S/p left BKA by vascular surgery on 3/24  Continue ASA/statin in the setting of underlying/predisposing PAD  Appreciate pain management input regarding analgesic optimization/titration  Ongoing PT/OT evaluations  Current homeless status is an obstacle to discharge

## 2023-04-13 NOTE — PROGRESS NOTES
Patient complaining of 3 days of numbness in R thigh and new burning sensation in R thigh  Peter and acute pain XIN Graham made aware

## 2023-04-13 NOTE — ASSESSMENT & PLAN NOTE
Hx of occluded bypass and profunda thrombus  Continue ASA/statin/Xarelto  Appreciate vascular surgery input  Appreciate hematology input -> thrombophilia evaluation as outpatient as deemed no need to obtain here as has been already on anticoagulation, hence, likely to skew results

## 2023-04-13 NOTE — PROGRESS NOTES
Progress Note - Acute Pain Service    Melvin Rojas 40 y o  female MRN: [id number]  Unit/Bed#: [facility] 904-01 Encounter: [id number]      Assessment:   Principal Problem:    S/P BKA (below knee amputation) unilateral, left ([facility] )  Active Problems:    Essential hypertension    Type 2 diabetes mellitus with diabetic polyneuropathy, with long-term current use of insulin ([facility])    Nicotine dependence    Bipolar disorder ([facility] )    PAD (peripheral artery disease) ([facility])    BMI 45 0-49 9, adult ([facility] )    Acute respiratory distress    Anemia    Leukocytosis    Melvin Rojas is a 40 y o  female  with past medical history significant for bipolar disorder, type 2 diabetes, PTSD, peripheral arterial disease status post left SFA PTA/stent placement in August 2021 with subsequent stent thrombectomy on 8/25/2021 requiring left CFA and BK pop bypass in September 2021  She was found to have occluded left FEM BK pop bypass on left lower extremity a gram with no outflow into the foot  She is s/p left AKA on 3/29/2023 for which she received left adductor canal and left popliteal blocks for postoperative analgesia  Acute pain service had initially signed off on 3/30/2023 as patient's pain had been well controlled postoperatively, we were asked to reengage 4/12 for ongoing uncontrolled pain  Patient seen at bedside this afternoon, while resting in bed  Continues to report moderate/severe left AKA stump pain with associated intermittent phantom limb pain  Tolerated opioid rotation to oral dilaudid but unsure if this has provided any additional analgesia  Suspect emotional component to ongoing pain  Discussed with patient and primary service will plan to de-escalate IV Dilaudid regimen      Plan:   · Decrease IV Dilaudid to 0 5 mg Q6prn, this will discontinue 4/14 at 0800    Multimodal analgesia:  · Oral Dilaudid 2 mg every 4 hours as needed, for moderate pain  · Oral Dilaudid 4 mg every 4 hours as needed, for severe pain  · Tylenol 975 mg every 8 hours scheduled  · Gabapentin 300 mg 3 times daily  · Estimated Creatinine Clearance: 167 1 mL/min (A) (by C-G formula based on SCr of 0 59 mg/dL (L))  · Robaxin-750 milligrams every 6 hours scheduled  · Cymbalta 20 mg daily for phantom limb pain    Bowel Regimen:  · Senna-s 2 tabs daily  · Miralax daily    APS will continue to follow  Please contact Acute Pain Service - [facility] via [employer]nexust from [phone] with additional questions or concerns  See Patrick or Sivan for additional contacts and after hours information  Pain History  Current pain location(s): L aka stump  Pain Scale:   10  Quality: sharp, shooting, aching  24 hour history:     Opioid requirement previous 24 hours: No acute events overnight, patient hemodynamically stable  She reports new right lower extremity pain to her right thigh and numbness  L aka stump pain relatively unchanged  Denies shortness of breath, nausea/vomiting, constipation/diarrhea      Meds/Allergies   all current active meds have been reviewed and current meds:   Current Facility-Administered Medications   Medication Dose Route Frequency   • acetaminophen (TYLENOL) tablet 975 mg  975 mg Oral Q8H [address]   • albuterol (PROVENTIL HFA,VENTOLIN HFA) inhaler 2 puff  2 puff Inhalation Q4H PRN   • aspirin chewable tablet 81 mg  81 mg Oral Daily   • atorvastatin (LIPITOR) tablet 40 mg  40 mg Oral Daily With Dinner   • bisacodyl (DULCOLAX) rectal suppository 10 mg  10 mg Rectal Daily PRN   • DULoxetine (CYMBALTA) delayed release capsule 20 mg  20 mg Oral Daily   • fluticasone-vilanterol 200-25 mcg/actuation 1 puff  1 puff Inhalation Daily   • gabapentin (NEURONTIN) capsule 300 mg  300 mg Oral TID   • hydrALAZINE (APRESOLINE) injection 10 mg  10 mg Intravenous Q6H PRN   • HYDROmorphone (DILAUDID) injection 0 5 mg  0 5 mg Intravenous Q6H PRN   • HYDROmorphone (DILAUDID) tablet 2 mg  2 mg Oral Q4H PRN    Or   • HYDROmorphone (DILAUDID) tablet 4 mg  4 mg Oral Q4H PRN   • insulin glargine (LANTUS) subcutaneous injection 45 Units 0 45 mL  45 Units Subcutaneous HS   • insulin lispro (HumaLOG) 100 units/mL subcutaneous injection 18 Units  18 Units Subcutaneous Daily With Breakfast   • insulin lispro (HumaLOG) 100 units/mL subcutaneous injection 2-12 Units  2-12 Units Subcutaneous TID AC   • insulin lispro (HumaLOG) 100 units/mL subcutaneous injection 2-12 Units  2-12 Units Subcutaneous HS   • insulin lispro (HumaLOG) 100 units/mL subcutaneous injection 20 Units  20 Units Subcutaneous Daily With Lunch    And   • insulin lispro (HumaLOG) 100 units/mL subcutaneous injection 22 Units  22 Units Subcutaneous Daily With Dinner   • lisinopril (ZESTRIL) tablet 20 mg  20 mg Oral Daily   • melatonin tablet 6 mg  6 mg Oral HS   • methocarbamol (ROBAXIN) tablet 750 mg  750 mg Oral Q6H ABDULKADIR   • naloxone (NARCAN) 0 04 mg/mL syringe 0 04 mg  0 04 mg Intravenous Q1MIN PRN   • nicotine (NICODERM CQ) 21 mg/24 hr TD 24 hr patch 21 mg  21 mg Transdermal Daily   • polyethylene glycol (MIRALAX) packet 17 g  17 g Oral Daily   • prazosin (MINIPRESS) capsule 1 mg  1 mg Oral HS   • QUEtiapine (SEROquel) tablet 200 mg  200 mg Oral HS   • rivaroxaban (XARELTO) tablet 15 mg  15 mg Oral BID With Meals   • [START ON 4/21/2023] rivaroxaban (XARELTO) tablet 20 mg  20 mg Oral Daily With Breakfast   • senna-docusate sodium (SENOKOT S) 8 6-50 mg per tablet 2 tablet  2 tablet Oral BID   • simethicone (MYLICON) chewable tablet 80 mg  80 mg Oral Q6H PRN   • topiramate (TOPAMAX) tablet 25 mg  25 mg Oral BID   • white petrolatum-mineral oil (EUCERIN,HYDROCERIN) cream   Topical TID PRN       No Known Allergies    Objective     Temp:  [97 3 °F (36 3 °C)-97 7 °F (36 5 °C)] 97 3 °F (36 3 °C)  HR:  [] 94  Resp:  [17] 17  BP: (100-125)/(60-80) 100/60    Physical Exam  Vitals reviewed  Constitutional:       General: She is not in acute distress  Appearance: She is not ill-appearing or toxic-appearing     HENT: Head: Normocephalic and atraumatic  Cardiovascular:      Rate and Rhythm: Normal rate  Pulmonary:      Effort: Pulmonary effort is normal  No tachypnea, bradypnea or respiratory distress  Abdominal:      General: There is no distension  Tenderness: There is no abdominal tenderness  Musculoskeletal:         General: Tenderness (L aka stump) present  Normal range of motion  Cervical back: Normal range of motion  Skin:     General: Skin is warm and dry  Coloration: Skin is not pale  Findings: No rash  Neurological:      Mental Status: She is alert and oriented to person, place, and time  Mental status is at baseline  Sensory: Sensory deficit present  Motor: No weakness or tremor  Psychiatric:         Mood and Affect: Mood normal          Behavior: Behavior normal          Lab Results:   Results from last 7 days   Lab Units 04/13/23  0552   WBC Thousand/uL 8 08   HEMOGLOBIN g/dL 8 5*   HEMATOCRIT % 25 4*   PLATELETS Thousands/uL 385      Results from last 7 days   Lab Units 04/09/23  0513   POTASSIUM mmol/L 3 8   CHLORIDE mmol/L 108   CO2 mmol/L 25   BUN mg/dL 15   CREATININE mg/dL 0 59*   CALCIUM mg/dL 8 2*       Imaging Studies: I have personally reviewed pertinent reports  Please note that the APS provides consultative services regarding pain management only  With the exception of ketamine and epidural infusions and except when indicated, final decisions regarding starting or changing doses of analgesic medications are at the discretion of the consulting service  Off hours consultation and/or medication management is generally not available      EMILE Crowe  Acute Pain Service

## 2023-04-14 NOTE — ASSESSMENT & PLAN NOTE
S/p left BKA by vascular surgery on 3/24  Continue ASA/statin in the setting of underlying/predisposing PAD  Appreciate pain management input regarding multimodal analgesic optimization/titration  Ongoing PT/OT evaluation -> tentative plan for discharge to skilled rehab early next week

## 2023-04-14 NOTE — PROGRESS NOTES
[address] [facility]  Progress Note  Name: Rupesh Galindo I  MRN: [id number]  [facility] I Date of Admission: 3/24/2023   Date of Service: 4/14/2023 I Hospital Day: 24      * S/p left BKA (below knee amputation)   Assessment & Plan  S/p left BKA by vascular surgery on 3/24  Continue ASA/statin in the setting of underlying/predisposing PAD  Appreciate pain management input regarding multimodal analgesic optimization/titration  Ongoing PT/OT evaluation -> tentative plan for discharge to skilled rehab early next week    PAD (peripheral artery disease)  Assessment & Plan  Hx of occluded bypass and profunda thrombus  Continue ASA/statin/Xarelto  Appreciate vascular surgery input  Appreciate hematology input -> thrombophilia evaluation as outpatient as deemed no need to obtain here as has been already on anticoagulation, hence, likely to skew results    Leukocytosis  Assessment & Plan  Likely reactive due to primary issue and recently treated UTI  WBC count normalized    Postoperative blood loss anemia  Assessment & Plan  Hemoglobin currently stable at 8 9 -> transfuse if drops < 7 0    Morbid obesity  Assessment & Plan  BMI of 53 57 currently  Lifestyle/diet modifications    Bipolar disorder  Assessment & Plan  Continue Seroquel/Topamax  Appreciate psychiatry input    Nicotine dependence  Assessment & Plan  Cessation counseling  Transdermal nicotine patch on board    Insulin-dependent diabetes mellitus with neuropathy  Assessment & Plan  Lab Results   Component Value Date    HGBA1C 9 8 (A) 12/20/2022     Appreciate endocrinology input  Continue basal/prandial insulin with additional SSI coverage per Accu-Cheks  On Gabapentin for neuropathy  History of poor dietary compliance noted  Hypoglycemia protocol   Carbohydrate restriction enforced    Essential hypertension  Assessment & Plan  Continue Zestril/Minipress  Optimize pain control      DVT Prophylaxis:  Xarelto    Patient Centered Rounds:  I have performed bedside rounds and discussed plan of care with nursing today  Discussions with Specialists or Other Care Team Provider:  see above assessments if applicable    Education and Discussions with Family / Patient: Patient at bedside - denied need to update other contacts     Time Spent for Care:  32 minutes  More than 50% of total time spent on counseling and coordination of care as described above  Current Length of Stay: 21 day(s)  Current Patient Status: Inpatient   Certification Statement:  Patient will continue to require additional hospital stay due to assessments as noted above  Code Status: Level 1 - Full Code        Subjective:     Encountered earlier today  States pain is well/waning but somewhat better controlled, when compared to over the last few days  Objective:     Vitals:   Temp (24hrs), Av 3 °F (36 3 °C), Min:97 °F (36 1 °C), Max:97 5 °F (36 4 °C)    Temp:  [97 °F (36 1 °C)-97 5 °F (36 4 °C)] 97 3 °F (36 3 °C)  HR:  [89-99] 98  Resp:  [14-17] 16  BP: ()/(58-86) 128/74  SpO2:  [96 %-99 %] 99 %  Body mass index is 53 57 kg/m²  Input and Output Summary (last 24 hours):        Intake/Output Summary (Last 24 hours) at 2023 1749  Last data filed at 2023 1300  Gross per 24 hour   Intake 420 ml   Output --   Net 420 ml       Physical Exam:     GENERAL  obese - improved distress from pain   HEAD   Normocephalic - atraumatic   EYES   PERRL - EOMI    MOUTH   Mucosa moist   NECK   Supple - full range of motion   CARDIAC  rate controlled currently - S1/S2 positive   PULMONARY  nonlabored respirations at rest   ABDOMEN   Soft - nontender/nondistended - active bowel sounds   MUSCULOSKELETAL   Motor strength/range of motion deconditioned -LLE stump dressed/bandaged   NEUROLOGIC   Alert/oriented at baseline   PSYCHIATRIC   Mood/affect stable         Additional Data:     Labs & Recent Cultures:    Results from last 7 days   Lab Units 23  9362 WBC Thousand/uL 7 68   HEMOGLOBIN g/dL 8 9*   HEMATOCRIT % 25 6*   PLATELETS Thousands/uL 409*   NEUTROS PCT % 63   LYMPHS PCT % 26   MONOS PCT % 5   EOS PCT % 5     Results from last 7 days   Lab Units 04/09/23  0513   POTASSIUM mmol/L 3 8   CHLORIDE mmol/L 108   CO2 mmol/L 25   BUN mg/dL 15   CREATININE mg/dL 0 59*   CALCIUM mg/dL 8 2*         Results from last 7 days   Lab Units 04/14/23  1702 04/14/23  1108 04/14/23  0828 04/13/23  2035 04/13/23  1627 04/13/23  1125 04/13/23  0722 04/12/23  2115 04/12/23  1922 04/12/23  1608 04/12/23  1051 04/12/23  0722   POC GLUCOSE mg/dl 153* 148* 152* 106 91 177* 170* 121 130 97 161* 192*                         Lines/Drains:  Invasive Devices     Peripherally Inserted Central Catheter Line  Duration           PICC Line 16/06/52 Right Basilic 20 days                  Last 24 Hours Medication List:   Current Facility-Administered Medications   Medication Dose Route Frequency Provider Last Rate   • acetaminophen  975 mg Oral Q8H [facility] Wale Levy MD     • albuterol  2 puff Inhalation Q4H PRN Wale Levy MD     • aspirin  81 mg Oral Daily Wale Levy MD     • atorvastatin  40 mg Oral Daily With Tomas Cartagena MD     • bisacodyl  10 mg Rectal Daily PRN Wale Levy MD     • DULoxetine  20 mg Oral Daily EMILE Euceda     • fluticasone-vilanterol  1 puff Inhalation Daily Wale Levy MD     • gabapentin  300 mg Oral TID EMILE Euceda     • hydrALAZINE  10 mg Intravenous Q6H PRN Wale Levy MD     • HYDROmorphone  2 mg Oral Q4H PRN EMILE Euceda      Or   • HYDROmorphone  4 mg Oral Q4H PRN EMILE Euceda     • insulin glargine  50 Units Subcutaneous HS Popeye La MD     • insulin lispro  20 Units Subcutaneous Daily With Lunch Dinora Gonzales MD      And   • insulin lispro  12 Units Subcutaneous Daily With Pavel Anthony MD     • insulin lispro  2-12 Units Subcutaneous TID AC Nory Smith MD     • insulin lispro  2-12 Units Subcutaneous HS Nory Smith MD     • [START ON 4/15/2023] insulin lispro  20 Units Subcutaneous Daily With Breakfast Amanda Kaplan MD     • lisinopril  20 mg Oral Daily Josias Bashir, DO     • melatonin  6 mg Oral HS Nory Smith MD     • methocarbamol  750 mg Oral Q6H [address] MalicklyEMILE Russell     • naloxone  0 04 mg Intravenous Q1MIN PRN Nory Smith MD     • nicotine  21 mg Transdermal Daily Nory Smith MD     • polyethylene glycol  17 g Oral Daily Nory Smith MD     • prazosin  1 mg Oral HS Nory Smith MD     • QUEtiapine  200 mg Oral HS Nory Smith MD     • rivaroxaban  15 mg Oral BID With Meals Josias Bashir DO     • [START ON 4/21/2023] rivaroxaban  20 mg Oral Daily With Breakfast Josias Bashir DO     • senna-docusate sodium  2 tablet Oral BID Nory Smith MD     • simethicone  80 mg Oral Q6H PRN Nory Smith MD     • topiramate  25 mg Oral BID Nory Smith MD     • white petrolatum-mineral oil   Topical TID PRN Josias Bashir DO                      ** Please Note: This note is constructed using a voice recognition dictation system  An occasional wrong word/phrase or “sound-a-like” substitution may have been picked up by dictation device due to the inherent limitations of voice recognition software  Read the chart carefully and recognize, using reasonable context, where substitutions may have occurred  **

## 2023-04-14 NOTE — CASE MANAGEMENT
Case Management Discharge Planning Note    Patient name Kiet Soto  [facility] MRN [id number]  : 1985 Date 2023       Current Admission Date: 3/24/2023  Current Admission Diagnosis:S/p left BKA (below knee amputation)    Patient Active Problem List    Diagnosis Date Noted   • Pyuria 2023   • Postoperative blood loss anemia 2023   • Leukocytosis 2023   • Acute respiratory distress 2023   • Class 3 severe obesity due to excess calories without serious comorbidity with body mass index (BMI) of 40 0 to 44 9 in adult [facility]) 2022   • Surgical wound breakdown, sequela 10/28/2021   • Positive D dimer 10/02/2021   • Atherosclerosis of left leg ([facility] ) 2021   • S/p left BKA (below knee amputation)  2021   • Bursitis of left knee 2021   • Morbid obesity 2021   • PAD (peripheral artery disease) 2021   • Diarrhea 2021   • Bilateral leg pain 2021   • Leg pain 2020   • Headache 2020   • Essential hypertension 2020   • Insulin-dependent diabetes mellitus with neuropathy 2020   • Paresthesia 2020   • COPD (chronic obstructive pulmonary disease) ([facility] ) 2020   • Nicotine dependence 2020   • Bipolar disorder 2020      LOS (days): 21  Geometric Mean LOS (GMLOS) (days): 4 30  Days to GMLOS:-16 6     OBJECTIVE:  Risk of Unplanned Readmission Score: 23 44         Current admission status: Inpatient   Preferred Pharmacy:   [address] [place] [address] [facility] [address]  Phone: [phone] Fax: [phone]    [facility] - [address] [place] [address] [place] [address]  [address] [place] [address] [address]  Phone: [phone] Fax: [phone] [address] [place], [address] [place] [address] [address] [place] [address] [facility]  Phone: [phone] Fax: [phone]    Primary Care Provider: Lo Santana Yomaira Sanches MD    Primary Insurance: [facility] REP  Secondary Insurance: [id number]    DISCHARGE DETAILS:    [facility]   They can accept pt for rehab Monday or Tuesday  He will call pt later today to discuss admission    Met with pt and advised  Advised we have no other accepting facility  She is agreeable to admission to    Provided facility address    Advised we will order WC today  She would like a walker as well  Advised she was provided one in Sept 2021 and insurance will not cover another  States when she left her apartment it was emptied and she does not know where it is  She cannot afford to buy another    Discussed With Director MARCIA SANDOVAL will provide her the walker    Same ordered from consignment and provided to pt    Financial assistance form faxed to [employer]    Wheelchair requested via Gracie Urbina

## 2023-04-14 NOTE — PLAN OF CARE
Problem: PHYSICAL THERAPY ADULT  Goal: Performs mobility at highest level of function for planned discharge setting  See evaluation for individualized goals  Description: Treatment/Interventions: OT, Spoke to case management, Spoke to nursing, Gait training, Bed mobility, Patient/family training, Endurance training, Functional transfer training, LE strengthening/ROM  Equipment Recommended: Wheelchair, Claudio Balbuena       See flowsheet documentation for full assessment, interventions and recommendations  Outcome: Progressing  Note: Prognosis: Good  Problem List: Decreased strength, Decreased range of motion, Decreased endurance, Impaired balance, Decreased mobility, Decreased safety awareness, Pain, Orthopedic restrictions  Assessment: Pt seen for PT treatment session this date  Therapy session focused on WC mobility and WC evaluation, transfer training and bed mobility in order to improve overall mobility and independence  Pt completed bed mobility tasks with supervision and increased time for hygiene  WC mobility continued this date; requires min Ax1 for occasional steering around turns and obstacles during WC mobility and verbal cues to lock breaks prior to STS transfers but demonstrated improved UE endurance for manual propulsion of WC for 50'  Pt continues to require seated rest breaks following propulsion 2* SOB and UE fatigue  Pt would benefit from shorter WC height to improve ability to self-propel with LLE  Conducted [facility] evaluation, please see flowsheet for measurements, to improve pt mobility in independence for DC  Pt making good progress toward goals  Pt was left supine in bed at the end of PT session with all needs in reach  Pt would benefit from continued PT services while in hospital to address remaining limitations  PT to continue treating pt and recommends HHPT vs STR pending continued progress and increased social supports  The patient's AM-PAC Basic Mobility Inpatient Short Form Raw Score is 16   A Raw score of less than or equal to 16 suggests the patient may benefit from discharge to post-acute rehabilitation services  Please also refer to the recommendation of the Physical Therapist for safe discharge planning  Barriers to Discharge: Inaccessible home environment, Decreased caregiver support     PT Discharge Recommendation: Home with home health rehabilitation (vs STR pending progress and increased social support)    See flowsheet documentation for full assessment

## 2023-04-14 NOTE — PLAN OF CARE
Problem: MOBILITY - ADULT  Goal: Maintain or return to baseline ADL function  Description: INTERVENTIONS:  -  Assess patient's ability to carry out ADLs; assess patient's baseline for ADL function and identify physical deficits which impact ability to perform ADLs (bathing, care of mouth/teeth, toileting, grooming, dressing, etc )  - Assess/evaluate cause of self-care deficits   - Assess range of motion  - Assess patient's mobility; develop plan if impaired  - Assess patient's need for assistive devices and provide as appropriate  - Encourage maximum independence but intervene and supervise when necessary  - Involve family in performance of ADLs  - Assess for home care needs following discharge   - Consider OT consult to assist with ADL evaluation and planning for discharge  - Provide patient education as appropriate  Outcome: Progressing  Goal: Maintains/Returns to pre admission functional level  Description: INTERVENTIONS:  - Perform BMAT or MOVE assessment daily    - Set and communicate daily mobility goal to care team and patient/family/caregiver     - Collaborate with rehabilitation services on mobility goals if consulted  Outcome: Progressing     Problem: Prexisting or High Potential for Compromised Skin Integrity  Goal: Skin integrity is maintained or improved  Description: INTERVENTIONS:  - Identify patients at risk for skin breakdown  - Assess and monitor skin integrity  - Assess and monitor nutrition and hydration status  - Monitor labs   - Assess for incontinence   - Turn and reposition patient  - Assist with mobility/ambulation  - Relieve pressure over bony prominences  - Avoid friction and shearing  - Provide appropriate hygiene as needed including keeping skin clean and dry  - Evaluate need for skin moisturizer/barrier cream  - Collaborate with interdisciplinary team   - Patient/family teaching  - Consider wound care consult   Outcome: Progressing     Problem: Nutrition/Hydration-ADULT  Goal: Nutrient/Hydration intake appropriate for improving, restoring or maintaining nutritional needs  Description: Monitor and assess patient's nutrition/hydration status for malnutrition  Collaborate with interdisciplinary team and initiate plan and interventions as ordered  Monitor patient's weight and dietary intake as ordered or per policy  Utilize nutrition screening tool and intervene as necessary  Determine patient's food preferences and provide high-protein, high-caloric foods as appropriate       INTERVENTIONS:  - Monitor oral intake, urinary output, labs, and treatment plans  - Assess nutrition and hydration status and recommend course of action  - Evaluate amount of meals eaten  - Assist patient with eating if necessary   - Allow adequate time for meals  - Recommend/ encourage appropriate diets, oral nutritional supplements, and vitamin/mineral supplements  - Order, calculate, and assess calorie counts as needed  - Recommend, monitor, and adjust tube feedings and TPN/PPN based on assessed needs  - Assess need for intravenous fluids  - Provide specific nutrition/hydration education as appropriate  - Include patient/family/caregiver in decisions related to nutrition  Outcome: Progressing     Problem: PAIN - ADULT  Goal: Verbalizes/displays adequate comfort level or baseline comfort level  Description: Interventions:  - Encourage patient to monitor pain and request assistance  - Assess pain using appropriate pain scale  - Administer analgesics based on type and severity of pain and evaluate response  - Implement non-pharmacological measures as appropriate and evaluate response  - Consider cultural and social influences on pain and pain management  - Notify physician/advanced practitioner if interventions unsuccessful or patient reports new pain  Outcome: Progressing

## 2023-04-14 NOTE — PHYSICAL THERAPY NOTE
PHYSICAL THERAPY NOTE      Patient Name: Codie Vázquez  LZAAO'L Date: 4/14/2023 04/14/23 1041   PT Last Visit   PT Visit Date 04/14/23   End of Consult   Patient Position at End of Consult Supine; All needs within reach   Pain Assessment   Pain Assessment Tool 0-10   Pain Score 8   Pain Location/Orientation Orientation: Left; Location: Leg   Hospital Pain Intervention(s) Repositioned; Ambulation/increased activity   Restrictions/Precautions   Weight Bearing Precautions Per Order Yes   LLE Weight Bearing Per Order NWB  (L AKA)   Other Precautions Fall Risk;Pain;WBS   General   Family/Caregiver Present No   Cognition   Overall Cognitive Status WFL   Attention Within functional limits   Orientation Level Oriented X4   Memory Within functional limits   Following Commands Follows one step commands without difficulty   Comments pt pleasant and cooperative t/o PT session   Bed Mobility   Rolling R 5  Supervision   Additional items Increased time required; Bedrails   Rolling L 5  Supervision   Additional items Bedrails; Increased time required   Supine to Sit 5  Supervision   Additional items Increased time required;HOB elevated   Sit to Supine 5  Supervision   Additional items HOB elevated; Increased time required   Additional Comments pt supine in bed upon arrival  Pt returned to bed with call bell and all needs within reach following PT session   Transfers   Sit to Stand 5  Supervision   Additional items Increased time required;Verbal cues   Stand to Sit 5  Supervision   Additional items Increased time required;Verbal cues   Stand pivot 4  Minimal assistance  (CGA)   Additional items Assist x 1; Increased time required;Verbal cues   Additional Comments RW used for all functional transfers  Increased VC for safety with WC brakes prior to STS transfer  (pt completed 5x STS throughout session)   Ambulation/Elevation   Gait pattern Decreased foot clearance; Forward Flexion; Short stride  (Pt used hopping technique for ambulation)   Gait Assistance 4  Minimal assist  (CGA)   Additional items Verbal cues; Assist x 1   Assistive Device Rolling walker   Distance 3' x3 trials   Ambulation/Elevation Additional Comments ambulation limited 2* LE pain and UE fatigue   Wheelchair Activities   Wheelchair Cushion Standard   Wheelchair Parts Management Yes   Left Brakes Level of Assistance Minimal verbal cues   Right Brakes Level of Assistance Minimal verbal cues   Propulsion Yes   Propulsion Type 1 Manual   Level 1 Level tile   Method 1 Right upper extremity; Left upper extremity  (unable to use LLE for asisst 2* height of WC)   Level of Assistance 1 Minimum assistance  (Min Ax1 for navigating obstacles/turns)   Description/ Details 1 Pt propelled WC for 50' with min Ax1 for navigation of turns and smaller spaces  Limited 2* UE fatigue and limited forward flexion in chair 2* protruding abdomen   Balance   Static Sitting Good   Dynamic Sitting Fair +   Static Standing Fair -   Dynamic Standing Fair -   Ambulatory Poor +   Endurance Deficit   Endurance Deficit Yes   Endurance Deficit Description UE fatigue, LLE pain, deconditioning   Activity Tolerance   Activity Tolerance Patient limited by fatigue;Patient limited by pain   Nurse Made Aware RN cleared pt for PT session   Assessment   Prognosis Good   Problem List Decreased strength;Decreased range of motion;Decreased endurance; Impaired balance;Decreased mobility; Decreased safety awareness;Pain;Orthopedic restrictions   Assessment Pt seen for PT treatment session this date  Therapy session focused on WC mobility and WC evaluation, transfer training and bed mobility in order to improve overall mobility and independence  Pt completed bed mobility tasks with supervision and increased time for hygiene   WC mobility continued this date; requires min Ax1 for occasional steering around turns and obstacles during WC mobility and verbal cues to lock breaks prior to STS transfers but demonstrated improved UE endurance for manual propulsion of WC for 50'  Pt continues to require seated rest breaks following propulsion 2* SOB and UE fatigue  Pt would benefit from shorter WC height to improve ability to self-propel with LLE  Conducted [facility] evaluation, please see flowsheet for measurements, to improve pt mobility in independence for DC  Pt making good progress toward goals  Pt was left supine in bed at the end of PT session with all needs in reach  Pt would benefit from continued PT services while in hospital to address remaining limitations  PT to continue treating pt and recommends HHPT vs STR pending continued progress and increased social supports  The patient's AM-PAC Basic Mobility Inpatient Short Form Raw Score is 16  A Raw score of less than or equal to 16 suggests the patient may benefit from discharge to post-acute rehabilitation services  Please also refer to the recommendation of the Physical Therapist for safe discharge planning  Barriers to Discharge Inaccessible home environment;Decreased caregiver support   Goals   Patient Goals to go to the bathroom   STG Expiration Date 04/25/23   PT Treatment Day 5   Plan   Treatment/Interventions Functional transfer training;LE strengthening/ROM; Therapeutic exercise; Endurance training;Equipment eval/education; Bed mobility;Gait training;Spoke to nursing   PT Frequency 3-5x/wk   Recommendation   PT Discharge Recommendation Home with home health rehabilitation  (vs STR pending progress and increased social support)   Equipment Recommended Wheelchair;Walker   Wheelchair Package Recommended Heavy Duty (pt wt 250 lbs+)   Change or Add item to Wheelchair Package? Yes, Change Item   What would you like to CHANGE? Lower Seat Height (Marko/Low Seat); Wider Seat Width  (WC evaluation measurements: Seat width: 26 in (24in +2 in); Seat height:n 16 in (15 in + 1 in); Seat depth: 18 in (16 in + 2 in); Back height: 11 in (15 in - 4 in);  Arm rest height: 11 in (10 in + 1 in))   W/C Seat Width 26 inch   Walker Package Recommended Wheeled walker   AM-PAC Basic Mobility Inpatient   Turning in Flat Bed Without Bedrails 3   Lying on Back to Sitting on Edge of Flat Bed Without Bedrails 3   Moving Bed to Chair 3   Standing Up From Chair Using Arms 3   Walk in Room 3   Climb 3-5 Stairs With Railing 1   Basic Mobility Inpatient Raw Score 16   Basic Mobility Standardized Score 38 32   Highest Level Of Mobility   JH-HLM Goal 5: Stand one or more mins   -HLM Achieved 5: Stand (1 or more minutes)   Modified Sharri Scale   Modified [place] Scale 4   End of Consult   Patient Position at End of Consult Supine; All needs within reach     Micah Lange SPT  04/14/23  3:08 PM

## 2023-04-15 NOTE — PROGRESS NOTES
[address] [facility]  Progress Note  Name: Marleni Finn I  MRN: [id number]  Unit/Bed#: [facility] 904-01 I Date of Admission: 3/24/2023   Date of Service: 4/15/2023 I Hospital Day: 25      * S/p left BKA (below knee amputation)   Assessment & Plan  S/p left BKA by vascular surgery on 3/24  Continue ASA/statin in the setting of underlying/predisposing PAD  Appreciate pain management input regarding multimodal analgesic optimization/titration  Ongoing PT/OT evaluation -> tentative plan for discharge to skilled rehab early next week    PAD (peripheral artery disease)  Assessment & Plan  Hx of occluded bypass and profunda thrombus  Continue ASA/statin/Xarelto  Appreciate vascular surgery input  Appreciate hematology input -> thrombophilia evaluation as outpatient as deemed no need to obtain here as has been already on anticoagulation, hence, likely to skew results    Leukocytosis  Assessment & Plan  Likely reactive due to primary issue and recently treated UTI  WBC count normalized    Postoperative blood loss anemia  Assessment & Plan  Hemoglobin currently stable at 9 0 -> transfuse if drops < 7 0    Morbid obesity  Assessment & Plan  BMI of 53 57 currently  Lifestyle/diet modifications    Bipolar disorder  Assessment & Plan  Continue Seroquel/Topamax  Appreciate psychiatry input    Nicotine dependence  Assessment & Plan  Cessation counseling  Transdermal nicotine patch on board    Insulin-dependent diabetes mellitus with neuropathy  Assessment & Plan  Lab Results   Component Value Date    HGBA1C 9 8 (A) 12/20/2022     Appreciate endocrinology input  Continue basal/prandial insulin with additional SSI coverage per Accu-Cheks  On Gabapentin for neuropathy  History of poor dietary compliance noted  Hypoglycemia protocol   Carbohydrate restriction enforced    Essential hypertension  Assessment & Plan  Continue Zestril/Minipress  Optimize pain control      DVT Prophylaxis:  Xarelto    Patient Centered Rounds:  I have performed bedside rounds and discussed plan of care with nursing today  Discussions with Specialists or Other Care Team Provider:  see above assessments if applicable    Education and Discussions with Family / Patient: Patient at bedside, who will self update other contacts as necessary    Time Spent for Care:  32 minutes  More than 50% of total time spent on counseling and coordination of care as described above  Current Length of Stay: 22 day(s)  Current Patient Status: Inpatient   Certification Statement:  Patient will continue to require additional hospital stay due to assessments as noted above  Code Status: Level 1 - Full Code        Subjective:     Encountered earlier today  Reports intermittent spikes in breakthrough pain today  No other acute complaints at this time        Objective:     Vitals:   Temp (24hrs), Av °F (36 1 °C), Min:96 6 °F (35 9 °C), Max:97 3 °F (36 3 °C)    Temp:  [96 6 °F (35 9 °C)-97 3 °F (36 3 °C)] 96 6 °F (35 9 °C)  HR:  [88-98] 90  Resp:  [16-17] 17  BP: (112-134)/(72-96) 134/79  SpO2:  [98 %-99 %] 98 %  Body mass index is 53 57 kg/m²  Input and Output Summary (last 24 hours):        Intake/Output Summary (Last 24 hours) at 4/15/2023 1553  Last data filed at 4/15/2023 0601  Gross per 24 hour   Intake --   Output 800 ml   Net -800 ml       Physical Exam:     GENERAL  obese - waxing/waning distress from pain   HEAD   Normocephalic - atraumatic   EYES   PERRL - EOMI    MOUTH   Mucosa moist   NECK   Supple - full range of motion   CARDIAC  rate controlled currently - S1/S2 positive   PULMONARY  nonlabored respirations at rest   ABDOMEN   Soft - nontender/nondistended - active bowel sounds   MUSCULOSKELETAL   Motor strength/range of motion deconditioned - LLE stump dressed/bandaged   NEUROLOGIC   Alert/oriented at baseline   PSYCHIATRIC   Mood/affect stable         Additional Data:     Labs & Recent Cultures:    Results from last 7 days   Lab Units 04/15/23  0521   WBC Thousand/uL 7 87   HEMOGLOBIN g/dL 9 0*   HEMATOCRIT % 26 4*   PLATELETS Thousands/uL 417*   NEUTROS PCT % 61   LYMPHS PCT % 27   MONOS PCT % 6   EOS PCT % 5     Results from last 7 days   Lab Units 04/09/23  0513   POTASSIUM mmol/L 3 8   CHLORIDE mmol/L 108   CO2 mmol/L 25   BUN mg/dL 15   CREATININE mg/dL 0 59*   CALCIUM mg/dL 8 2*         Results from last 7 days   Lab Units 04/15/23  1127 04/15/23  0732 04/14/23  2045 04/14/23  1702 04/14/23  1108 04/14/23  0828 04/13/23  2035 04/13/23  1627 04/13/23  1125 04/13/23  0722 04/12/23  2115 04/12/23  1922   POC GLUCOSE mg/dl 85 115 149* 153* 148* 152* 106 91 177* 170* 121 130                         Lines/Drains:  Invasive Devices     Peripherally Inserted Central Catheter Line  Duration           PICC Line 20/33/25 Right Basilic 21 days                  Last 24 Hours Medication List:   Current Facility-Administered Medications   Medication Dose Route Frequency Provider Last Rate   • acetaminophen  975 mg Oral Q8H [address] Aleksey Senior MD     • albuterol  2 puff Inhalation Q4H PRN Aleksey Senior MD     • aspirin  81 mg Oral Daily Aleksey Senior MD     • atorvastatin  40 mg Oral Daily With Sonia Garber MD     • bisacodyl  10 mg Rectal Daily PRN Aleksey Senior MD     • DULoxetine  20 mg Oral Daily EMILE Puri     • fluticasone-vilanterol  1 puff Inhalation Daily Aleksey Senior MD     • gabapentin  300 mg Oral TID EMILE Puri     • hydrALAZINE  10 mg Intravenous Q6H PRN Aleksey Senior MD     • HYDROmorphone  2 mg Oral Q4H PRN EMILE Puri      Or   • HYDROmorphone  4 mg Oral Q4H PRN EMILE Puri     • insulin glargine  50 Units Subcutaneous HS Zeke Bliss MD     • insulin lispro  20 Units Subcutaneous Daily With Rehan Zepeda MD      And   • insulin lispro  12 Units Subcutaneous Daily With Mary Tony MD • insulin lispro  2-12 Units Subcutaneous TID AC Geo Webb MD     • insulin lispro  2-12 Units Subcutaneous HS Geo Webb MD     • insulin lispro  20 Units Subcutaneous Daily With Breakfast Mishel Magdaleno MD     • lisinopril  20 mg Oral Daily Aaliyah Agudelo DO     • melatonin  6 mg Oral HS Geo Webb MD     • methocarbamol  750 mg Oral Q6H [address] VelEMILE Beckford     • naloxone  0 04 mg Intravenous Q1MIN PRN Geo Webb MD     • nicotine  21 mg Transdermal Daily Geo Webb MD     • polyethylene glycol  17 g Oral Daily Geo Webb MD     • prazosin  1 mg Oral HS Geo Webb MD     • QUEtiapine  200 mg Oral HS Geo Webb MD     • rivaroxaban  15 mg Oral BID With Meals Laura DO     • [START ON 4/21/2023] rivaroxaban  20 mg Oral Daily With Breakfast Laura DO     • senna-docusate sodium  2 tablet Oral BID Geo Webb MD     • simethicone  80 mg Oral Q6H PRN Geo Webb MD     • topiramate  25 mg Oral BID Geo Webb MD     • white petrolatum-mineral oil   Topical TID PRN DO Laura                      ** Please Note: This note is constructed using a voice recognition dictation system  An occasional wrong word/phrase or “sound-a-like” substitution may have been picked up by dictation device due to the inherent limitations of voice recognition software  Read the chart carefully and recognize, using reasonable context, where substitutions may have occurred  **

## 2023-04-15 NOTE — ASSESSMENT & PLAN NOTE
Lab Results   Component Value Date    HGBA1C 9 8 (A) 12/20/2022     Appreciate endocrinology input  Continue basal/prandial insulin with additional SSI coverage per Accu-Cheks as titrated by endocrinology  On Gabapentin for neuropathy  History of poor dietary compliance noted  Hypoglycemia protocol   Carbohydrate restriction enforced

## 2023-04-15 NOTE — ASSESSMENT & PLAN NOTE
S/p left BKA by vascular surgery on 3/24  Continue ASA/statin in the setting of underlying/predisposing PAD  Appreciate pain management input regarding multimodal analgesic optimization/titration  Ongoing PT/OT evaluation -> DC planning to SNF tomorrow upon bed availability

## 2023-04-16 PROBLEM — Z91.81 STATUS POST FALL: Status: ACTIVE | Noted: 2023-04-16

## 2023-04-16 PROBLEM — G89.18 POSTOPERATIVE PAIN: Status: ACTIVE | Noted: 2023-04-16

## 2023-04-16 NOTE — ASSESSMENT & PLAN NOTE
Patient sustained a fall yesterday morning recollecting landing near stump site with increased stump pain  Status post x-ray

## 2023-04-16 NOTE — PROGRESS NOTES
[facility]  Progress Note  Name: Rupesh Galindo I  MRN: [id number]  Unit/Bed#: [facility] 904-01 I Date of Admission: 3/24/2023   Date of Service: 4/16/2023 I Hospital Day: 21      * S/p left BKA (below knee amputation)   Assessment & Plan  S/p left BKA by vascular surgery on 3/24  Continue ASA/statin in the setting of underlying/predisposing PAD  Appreciate pain management input regarding multimodal analgesic optimization/titration  Ongoing PT/OT evaluation -> tentative plan for discharge to skilled rehab this upcoming week with optimized pain control    Postoperative pain  Assessment & Plan  Has been evaluated by pain management and was weaned off IV breakthrough agents prior to the weekend  Unfortunately, over the weekend, patient continues to report intermittent spikes in uncontrolled pain with just current oral regimen -> due to persistence of pain/discomfort, has been given intermittent doses of IV Dilaudid for breakthrough  Due to fall this morning and landing near stump site, given additional IV agent doses today (pending imaging)  Will appreciate further pain management recommendations after the weekend, tomorrow    Status post fall  Assessment & Plan  Patient sustained a fall early this morning recollecting landing near stump site with increased stump pain  Await XR imaging    Leukocytosis  Assessment & Plan  Likely reactive due to primary issue and recently treated UTI  WBC count normalized    PAD (peripheral artery disease)  Assessment & Plan  Hx of occluded bypass and profunda thrombus  Continue ASA/statin/Xarelto  Appreciate vascular surgery input  Appreciate hematology input -> thrombophilia evaluation as outpatient as deemed no need to obtain here as has been already on anticoagulation, hence, likely to skew results    Postoperative blood loss anemia  Assessment & Plan  Hemoglobin currently stable at 9 0 over the last few days -> transfuse if drops < 7 0    Morbid obesity  Assessment & Plan  BMI of 53 57 currently  Lifestyle/diet modifications    Bipolar disorder  Assessment & Plan  Continue Seroquel/Topamax  Appreciate psychiatry input    Nicotine dependence  Assessment & Plan  Cessation counseling  Transdermal nicotine patch on board    Insulin-dependent diabetes mellitus with neuropathy  Assessment & Plan  Lab Results   Component Value Date    HGBA1C 9 8 (A) 2022     Appreciate endocrinology input  Continue basal/prandial insulin with additional SSI coverage per Accu-Cheks as titrated by endocrinology  On Gabapentin for neuropathy  History of poor dietary compliance noted  Hypoglycemia protocol   Carbohydrate restriction enforced    Essential hypertension  Assessment & Plan  Continue Zestril/Minipress  Optimize pain control      DVT Prophylaxis:  Xarelto    Patient Centered Rounds:  I have performed bedside rounds and discussed plan of care with nursing today  Discussions with Specialists or Other Care Team Provider:  see above assessments if applicable    Education and Discussions with Family / Patient: Patient at bedside, who will self update other contacts as necessary    Time Spent for Care:  32 minutes  More than 50% of total time spent on counseling and coordination of care as described above  Current Length of Stay: 23 day(s)  Current Patient Status: Inpatient   Certification Statement:  Patient will continue to require additional hospital stay due to assessments as noted above  Code Status: Level 1 - Full Code        Subjective:     Seen and examined earlier in the day  Events of earlier this morning regarding a fall noted  Patient continues to report uncontrolled episodes of pain, requiring intravenous agents for breakthrough comfort          Objective:     Vitals:   Temp (24hrs), Av 9 °F (36 1 °C), Min:96 6 °F (35 9 °C), Max:97 3 °F (36 3 °C)    Temp:  [96 6 °F (35 9 °C)-97 3 °F (36 3 °C)] 96 6 °F (35 9 °C)  HR:  [] 105  Resp:  [17-20] 20  BP: (108-133)/(64-88) 114/68  SpO2:  [94 %-99 %] 94 %  Body mass index is 53 57 kg/m²  Input and Output Summary (last 24 hours):        Intake/Output Summary (Last 24 hours) at 4/16/2023 1455  Last data filed at 4/15/2023 2142  Gross per 24 hour   Intake 600 ml   Output 750 ml   Net -150 ml       Physical Exam:     GENERAL  obese -increased distress today from pain after fall   HEAD   Normocephalic - atraumatic   EYES   PERRL - EOMI    MOUTH   Mucosa moist   NECK   Supple - full range of motion   CARDIAC  rate controlled currently - S1/S2 positive   PULMONARY  nonlabored respirations at rest   ABDOMEN   Soft - nontender/nondistended    MUSCULOSKELETAL   Motor strength/range of motion deconditioned - LLE stump dressed/bandaged   NEUROLOGIC   Alert/oriented at baseline   PSYCHIATRIC   Mood/affect mildly anxious         Additional Data:     Labs & Recent Cultures:    Results from last 7 days   Lab Units 04/16/23  0535   WBC Thousand/uL 7 07   HEMOGLOBIN g/dL 9 0*   HEMATOCRIT % 26 3*   PLATELETS Thousands/uL 419*   NEUTROS PCT % 60   LYMPHS PCT % 27   MONOS PCT % 7   EOS PCT % 5           Invalid input(s): LABALBU      Results from last 7 days   Lab Units 04/16/23  1100 04/16/23  0747 04/16/23  0513 04/15/23  2140 04/15/23  1632 04/15/23  1127 04/15/23  0732 04/14/23  2045 04/14/23  1702 04/14/23  1108 04/14/23  0828 04/13/23  2035   POC GLUCOSE mg/dl 127 148* 142* 90 141* 85 115 149* 153* 148* 152* 106                         Lines/Drains:  Invasive Devices     Peripherally Inserted Central Catheter Line  Duration           PICC Line 78/37/89 Right Basilic 22 days                  Last 24 Hours Medication List:   Current Facility-Administered Medications   Medication Dose Route Frequency Provider Last Rate   • acetaminophen  975 mg Oral Q8H [address] Abhijit Harvey MD     • albuterol  2 puff Inhalation Q4H PRN Abhijit Harvey MD     • aspirin  81 mg Oral Daily Abhijit Harvey MD     • atorvastatin  40 mg Oral Daily With Rachell Ordaz MD     • bisacodyl  10 mg Rectal Daily PRN Brianne Escalante MD     • DULoxetine  20 mg Oral Daily Catha Million, CRNP     • fluticasone-vilanterol  1 puff Inhalation Daily Brianne Escalante MD     • gabapentin  300 mg Oral TID Catha Million, CRNP     • hydrALAZINE  10 mg Intravenous Q6H PRN Brianne Escalante MD     • HYDROmorphone  2 mg Oral Q4H PRN Catha Million, CRNP      Or   • HYDROmorphone  4 mg Oral Q4H PRN Catha Million, CRNP     • insulin glargine  45 Units Subcutaneous HS Emma Donaldson, DO     • insulin lispro  20 Units Subcutaneous Daily With Lunch Levon Quan MD      And   • insulin lispro  12 Units Subcutaneous Daily With Uma Gomez MD     • insulin lispro  2-12 Units Subcutaneous TID AC Brianne Escalante MD     • insulin lispro  2-12 Units Subcutaneous HS Brianne Escalante MD     • insulin lispro  20 Units Subcutaneous Daily With Breakfast Zaida Acosta MD     • lisinopril  20 mg Oral Daily Aaliyah Agudelo DO     • melatonin  6 mg Oral HS Brianne Escalante MD     • methocarbamol  750 mg Oral Q6H [facility] Catha Million, CRNP     • naloxone  0 04 mg Intravenous Q1MIN PRN Brianne Escalante MD     • nicotine  21 mg Transdermal Daily Brianne Escalante MD     • polyethylene glycol  17 g Oral Daily Brianne Escalante MD     • prazosin  1 mg Oral HS Brianne Escalante MD     • QUEtiapine  200 mg Oral HS Brianne Escalante MD     • rivaroxaban  15 mg Oral BID With Meals Lomalvin Fess, DO     • [START ON 4/21/2023] rivaroxaban  20 mg Oral Daily With Breakfast Juan J Salazar, DO     • senna-docusate sodium  2 tablet Oral BID Brianne Escalante MD     • simethicone  80 mg Oral Q6H PRN Brianne Escalante MD     • topiramate  25 mg Oral BID Brianne Escalante MD     • white petrolatum-mineral oil Topical TID PRN Juan J Fess, DO                      ** Please Note: This note is constructed using a voice recognition dictation system  An occasional wrong word/phrase or “sound-a-like” substitution may have been picked up by dictation device due to the inherent limitations of voice recognition software  Read the chart carefully and recognize, using reasonable context, where substitutions may have occurred  **

## 2023-04-16 NOTE — RAPID RESPONSE
"Rapid Response Note  Carlitos Rosenberg 40 y o  female MRN: [id number]  Unit/Bed#: [facility] 904-01 Encounter: [id number]    Rapid Response Notification(s):   Response called date/time:  4/16/2023 5:22 AM  Response team arrival date/time:  4/16/2023 5:22 AM  Response end date/time:  4/16/2023 5:20 AM  Level of care:  Medsur  Rapid response location:  [facility] unit  Primary reason for rapid response call: Fall    Rapid Response Intervention(s):   Airway:  None  Breathing:  None  Circulation:  None  Fluids administered:  None       Assessment:   · Fall    Plan:   · Pain meds  · Evaluate amputation site     Rapid Response Outcome:   Transfer:  Remain on floor         Background/Situation:   Carlitos Rosenberg is a 40 y o  female who was a fall while ambulating to the commode  She states she lost her balance and landed on her knee/amputation site  Denies head strike  Patient placed on hoverjack and transported back to bed  Review of Systems   Respiratory: Negative for shortness of breath  Cardiovascular: Negative for chest pain  Musculoskeletal:        L amputation site pain   Neurological: Negative for dizziness and syncope  Objective:   Vitals:    04/15/23 0729 04/15/23 1637 04/15/23 2100 04/15/23 2142   BP: 134/79 132/78  125/71   Pulse: 90 96  96   Resp: 17 18  18   Temp: (!) 96 6 °F (35 9 °C) (!) 97 3 °F (36 3 °C)     TempSrc:       SpO2: 98% 96% 97% 97%   Weight:       Height:         Physical Exam  Constitutional:       Appearance: She is obese  Musculoskeletal:      Left Lower Extremity: Left leg is amputated above knee  Skin:     General: Skin is warm  Neurological:      General: No focal deficit present  Mental Status: She is alert and oriented to person, place, and time  Portions of the record may have been created with voice recognition software    Occasional wrong word or \"sound a like\" substitutions may have occurred due to the inherent limitations of voice recognition " software  Read the chart carefully and recognize, using context, where substitutions have occurred      Saloni Rodríguez PA-C

## 2023-04-16 NOTE — PROGRESS NOTES
"Progress Note - Leola Bullock 40 y o  female MRN: [id number]    Unit/Bed#: [facility] 904-01 Encounter: [id number]    CC: diabetes f/u    Subjective:   Leola Bullock is a 40y o  year old female with type 2 diabetes status post left BKA  She reports that she had eggs and sausage with breakfast this morning, held onto apple juice to drink later  Dinner last night was a salad with croutons and cottage cheese, she did have an apple juice with dinner  Prelunch blood sugar yesterday was 85 and Humalog was held, she also did not eat lunch yesterday  There was a rapid response this morning due to fall  Objective:     Vitals: Blood pressure 108/64, pulse 94, temperature (!) 96 8 °F (36 °C), resp  rate 17, height 5' 1\" (1 549 m), weight 129 kg (283 lb 8 2 oz), SpO2 95 %, not currently breastfeeding  ,Body mass index is 53 57 kg/m²  Intake/Output Summary (Last 24 hours) at 4/16/2023 1132  Last data filed at 4/15/2023 2142  Gross per 24 hour   Intake 600 ml   Output 750 ml   Net -150 ml       Physical Exam:  General Appearance: awake, appears stated age and cooperative  Head: Normocephalic, without obvious abnormality, atraumatic  Extremities: moves all extremities, L BKA  Skin: Skin color and temperature normal    Pulm: no labored breathing    Lab, Imaging and other studies: I have personally reviewed pertinent reports  POC Glucose (mg/dl)   Date Value   04/16/2023 127   04/16/2023 148 (H)   04/16/2023 142 (H)   04/15/2023 90   04/15/2023 141 (H)   04/15/2023 85   04/15/2023 115   04/14/2023 149 (H)   04/14/2023 153 (H)   04/14/2023 148 (H)       Assessment and plan:  Type 2 diabetes  A1c 9 8%  Home regimen:metformin 1000 mg twice daily, Lantus 20 units twice daily, lispro 21 units 3 times daily with meals, Trulicity 1 5 mg weekly    Inpatient regimen: Lantus 50 units at bedtime, Humalog 20/20/12 with meals  Decrease Lantus to 45 units at bedtime, continue Humalog with current dosing  Continue to monitor " blood glucoses and make adjustments as needed over time  Portions of the record may have been created with voice recognition software

## 2023-04-16 NOTE — ASSESSMENT & PLAN NOTE
Has been evaluated by pain management and was weaned off IV breakthrough agents prior to the weekend  Unfortunately, over the weekend, patient continued to report intermittent spikes in uncontrolled pain with just current oral regimen -> due to persistence of pain/discomfort, has been given intermittent doses of IV Dilaudid for breakthrough  IV Dilaudid discontinued

## 2023-04-17 NOTE — CASE MANAGEMENT
Sabina Anderson 51 received request for authorization from Care Manager  Authorization request for: [facility]  Facility Name:  [facility], [facility]  NPI: [id number]  Facility MD:  Dr Lela Hatchet  NPI: [id number]  Authorization initiated by contacting insurance: [facility] Via: Phone  Pending Reference #: [id number]  Clinicals submitted via:  Fax

## 2023-04-17 NOTE — OCCUPATIONAL THERAPY NOTE
Occupational Therapy Progress Note     Patient Name: Endy Echevarria  LMDZE'N Date: 4/17/2023  Problem List  Principal Problem:    S/p left BKA (below knee amputation)   Active Problems:    Essential hypertension    Insulin-dependent diabetes mellitus with neuropathy    Nicotine dependence    Bipolar disorder    PAD (peripheral artery disease)    Morbid obesity    Postoperative blood loss anemia    Leukocytosis    Status post fall    Postoperative pain            04/17/23 1203   OT Last Visit   OT Visit Date 04/17/23   Note Type   Note Type Treatment for insurance authorization   Pain Assessment   Pain Assessment Tool 0-10   Pain Score 10 - Worst Possible Pain   Pain Location/Orientation Orientation: Left; Location: Leg  (stump)   Hospital Pain Intervention(s) Repositioned; Ambulation/increased activity   Restrictions/Precautions   Weight Bearing Precautions Per Order Yes   LLE Weight Bearing Per Order NWB  (s/p L AKA)   Other Precautions Fall Risk;Pain;WBS   Lifestyle   Autonomy I with ADLS, shares IALD with wife Does not drive   Reciprocal Relationships supportive wife   Service to Others not working   Intrinsic Gratification Shopping, spending time with her wife   ADL   Where Assessed Wheelchair   LB Dressing Assistance 4  Minimal Assistance   LB Dressing Deficit Thread RLE into pants; Thread LLE into pants;Pull up over hips;Don/doff R shoe   LB Dressing Comments cues for safety   Transfers   Sit to Stand 5  Supervision   Additional items Increased time required;Armrests   Stand to Sit 5  Supervision   Additional items Increased time required   Additional Comments transfers with RW   Functional Mobility   Functional Mobility 5  Supervision   Additional Comments MIN A for navigating around obstacles   Additional items   (WC)   Cognition   Overall Cognitive Status WFL   Arousal/Participation Responsive; Cooperative   Attention Within functional limits   Orientation Level Oriented X4   Memory Within functional limits   Following Commands Follows one step commands without difficulty   Comments Pt pleasant and cooperative t/o session   Activity Tolerance   Activity Tolerance Patient limited by pain   Medical Staff Made Aware RN 1 [address] Elli Herrera   Assessment   Assessment Patient participated in Skilled OT session this date with interventions consisting of ADL re training with the use of correct body mechnaics, Energy Conservation techniques, deep breathing technique, safety awareness and fall prevention techniques,  therapeutic activities to: increase activity tolerance, increase dynamic sit/ stand balance during functional activity  and increase OOB/ sitting tolerance   Upon arrival patient was found seated OOB to W/C  Pt demonstrated the following tasks: S STS with RW, S for navigating WC on straightaways, CGA for navigating around obstacles, increased cueing for turns  Pt dons and doffs pants with MIN A, cues for safety  Increased difficulty threading over R foot, CGA to maintain stance while pulling up over hips  Education provided regarding WC management, including managing leg rests for ADL tasks  Patient continues to be functioning below baseline level, occupational performance remains limited secondary to factors listed above and increased risk for falls and injury  From OT standpoint, recommendation at time of d/c would be home with skilled therapy vs STR - pending progress  Patient to benefit from continued Occupational Therapy treatment while in the hospital to address deficits as defined above and maximize level of functional independence with ADLs and functional mobility  Pt was left after session with all current needs met  The patient's raw score on the AM-PAC Daily Activity Inpatient Short Form is 21  A raw score of greater than or equal to 19 suggests the patient may benefit from discharge to home  Please refer to the recommendation of the Occupational Therapist for safe discharge planning     Plan   Treatment Interventions ADL retraining;Functional transfer training;UE strengthening/ROM; Endurance training;Patient/family training;Equipment evaluation/education; Compensatory technique education;Continued evaluation; Energy conservation; Activityengagement   Goal Expiration Date 04/26/23   OT Treatment Day 4   OT Frequency 3-5x/wk   Recommendation   OT Discharge Recommendation Home with home health rehabilitation  (vs STR - pending progress)   AM-PAC Daily Activity Inpatient   Lower Body Dressing 3   Bathing 3   Toileting 3   Upper Body Dressing 4   Grooming 4   Eating 4   Daily Activity Raw Score 21   Daily Activity Standardized Score (Calc for Raw Score >=11) 44 27   AM-PAC Applied Cognition Inpatient   Following a Speech/Presentation 4   Understanding Ordinary Conversation 4   Taking Medications 4   Remembering Where Things Are Placed or Put Away 4   Remembering List of 4-5 Errands 4   Taking Care of Complicated Tasks 4   Applied Cognition Raw Score 24   Applied Cognition Standardized Score 62 21        Marcela Pascual MS, OTR/L

## 2023-04-17 NOTE — PHYSICAL THERAPY NOTE
PHYSICAL THERAPY NOTE          Patient Name: Kimberly Edouard  [id number] Date: 4/17/2023 04/17/23 1146   PT Last Visit   PT Visit Date 04/17/23   Note Type   Note Type Treatment for insurance authorization   Pain Assessment   Pain Assessment Tool 0-10   Pain Score 10 - Worst Possible Pain   Restrictions/Precautions   Weight Bearing Precautions Per Order Yes   LLE Weight Bearing Per Order NWB  (L AKA)   Other Precautions WBS; Fall Risk;Pain   General   Chart Reviewed Yes   Response to Previous Treatment Patient with no complaints from previous session  Family/Caregiver Present No   Cognition   Overall Cognitive Status WFL   Arousal/Participation Responsive; Cooperative   Attention Within functional limits   Orientation Level Oriented X4   Memory Within functional limits   Following Commands Follows one step commands without difficulty   Comments pt pleasant and cooperative to participate in therapy session   Bed Mobility   Supine to Sit 5  Supervision   Additional items HOB elevated; Bedrails; Increased time required   Sit to Supine Unable to assess   Additional Comments pt supine in bed upon arrival  Pt left sitting OOB in [facility] with OT present at end of therapy session   Transfers   Sit to Stand 5  Supervision   Additional items Armrests; Increased time required;Verbal cues   Stand to Sit 5  Supervision   Additional items Armrests; Increased time required;Verbal cues   Stand pivot 4  Minimal assistance   Additional items Assist x 1; Increased time required;Verbal cues   Toilet transfer 5  Supervision   Additional items Armrests; Increased time required;Verbal cues; Commode   Additional Comments transfers with RW; cues for hand placement and sequencing   Ambulation/Elevation   Gait pattern Excessively slow; Short stride; Foward flexed;Decreased foot clearance; Improper Weight shift   Gait Assistance 4  Minimal assist   Additional items Assist x 1;Verbal cues; Tactile cues   Assistive Device Rolling walker   Distance 3' x 2 trials   Ambulation/Elevation Additional Comments ambualtion limited 2* fatigue + LLE pain   Wheelchair Activities   Wheelchair Cushion Standard   Wheelchair Parts Management Yes   Left Brakes Level of Assistance Minimal verbal cues   Right Brakes Level of Assistance Minimal verbal cues   Propulsion Yes   Propulsion Type 1 Manual   Level 1 Level tile   Method 1 Right upper extremity; Left upper extremity;Right lower extremity   Level of Assistance 1 Close supervision  (for straight aways, CGA for rotational turns/ obstacle negotiation)   Description/ Details 1 pt propelled WC 40ft (rest) 30ft for a total of 70ft  Pt limited 2* UE fatuge + deconditioning   Balance   Static Sitting Fair +   Dynamic Sitting Fair +   Static Standing Fair -   Dynamic Standing Poor +   Ambulatory Poor +   Endurance Deficit   Endurance Deficit Yes   Endurance Deficit Description fatigue, deconditioning, pain   Activity Tolerance   Activity Tolerance Patient tolerated treatment well   Medical Staff Made Aware SPT Marisol   Nurse Made Aware RN cleared pt to participate in therapy session   Assessment   Prognosis Good   Problem List Decreased strength;Decreased range of motion;Decreased endurance; Impaired balance;Decreased mobility; Decreased safety awareness;Pain;Orthopedic restrictions   Assessment Pt seen for PT treatment session this date  Therapy session focused on bed mobility, functional transfers, gait training and WC management/ mobility in order to improve overall mobility and independence  Pt requires assist of 1 for all mobility performed this date  Pt with fall over weekend, per pt sock was twisted and pt fell on residual limb- pt with some residual pain from incident  Pt's WC delivered this date, increased time required to educate pt on WC parts, pt with good understanding  Pt requiring min A for SPT/ ambulation this date using RW   Pt making progress toward goals  Pt was left sitting OOB in WC at the end of PT session with all needs in reach  Pt would benefit from continued PT services while in hospital to address remaining limitations  PT to continue to follow pt and recommends home with HHPT vs STR pending progress  The patient's AM-PAC Basic Mobility Inpatient Short Form Raw Score is 16  A Raw score of less than or equal to 16 suggests the patient may benefit from discharge to post-acute rehabilitation services  Please also refer to the recommendation of the Physical Therapist for safe discharge planning  Barriers to Discharge Inaccessible home environment;Decreased caregiver support   Goals   Patient Goals to go to the bathroom   STG Expiration Date 04/25/23   PT Treatment Day 6   Plan   Treatment/Interventions Functional transfer training;LE strengthening/ROM; Endurance training;Patient/family training;Equipment eval/education; Bed mobility;Gait training;Spoke to nursing;Spoke to case management;OT   Progress Progressing toward goals   PT Frequency 3-5x/wk   Recommendation   PT Discharge Recommendation Home with home health rehabilitation  (vs STR pending progress)   Equipment Recommended Wheelchair;Walker   Wheelchair Package Recommended Heavy Duty (pt wt 250 lbs+)   Change or Add item to Wheelchair Package? Yes, Change Item   What would you like to CHANGE? Lower Seat Height (Marko/Low Seat); Wider Seat Width   W/C Seat Width 26 inch   Walker Package Recommended Wheeled walker   AM-PAC Basic Mobility Inpatient   Turning in Flat Bed Without Bedrails 3   Lying on Back to Sitting on Edge of Flat Bed Without Bedrails 3   Moving Bed to Chair 3   Standing Up From Chair Using Arms 3   Walk in Room 3   Climb 3-5 Stairs With Railing 1   Basic Mobility Inpatient Raw Score 16   Basic Mobility Standardized Score 38 32   Highest Level Of Mobility   JH-HLM Goal 5: Stand one or more mins   JH-HLM Achieved 5: Stand (1 or more minutes)   Education   Education Provided Mobility training;Assistive device   Patient Demonstrates acceptance/verbal understanding   End of Consult   Patient Position at End of Consult All needs within reach  (left in [facility])     Mary Goodrich, PT, DPT

## 2023-04-17 NOTE — PLAN OF CARE
Problem: MOBILITY - ADULT  Goal: Maintain or return to baseline ADL function  Description: INTERVENTIONS:  -  Assess patient's ability to carry out ADLs; assess patient's baseline for ADL function and identify physical deficits which impact ability to perform ADLs (bathing, care of mouth/teeth, toileting, grooming, dressing, etc )  - Assess/evaluate cause of self-care deficits   - Assess range of motion  - Assess patient's mobility; develop plan if impaired  - Assess patient's need for assistive devices and provide as appropriate  - Encourage maximum independence but intervene and supervise when necessary  - Involve family in performance of ADLs  - Assess for home care needs following discharge   - Consider OT consult to assist with ADL evaluation and planning for discharge  - Provide patient education as appropriate  Outcome: Progressing  Goal: Maintains/Returns to pre admission functional level  Description: INTERVENTIONS:  - Perform BMAT or MOVE assessment daily    - Set and communicate daily mobility goal to care team and patient/family/caregiver     - Collaborate with rehabilitation services on mobility goals if consulted  Outcome: Progressing

## 2023-04-17 NOTE — CASE MANAGEMENT
Case Management Discharge Planning Note    Patient name Deleta Fill  Location [facility] 904/[facility] 576-06 MRN [id number]  : 1985 Date 2023       Current Admission Date: 3/24/2023  Current Admission Diagnosis:S/p left BKA (below knee amputation)    Patient Active Problem List    Diagnosis Date Noted   • Status post fall 2023   • Postoperative pain 2023   • Pyuria 2023   • Postoperative blood loss anemia 2023   • Leukocytosis 2023   • Acute respiratory distress 2023   • Class 3 severe obesity due to excess calories without serious comorbidity with body mass index (BMI) of 40 0 to 44 9 in adult [facility]) 2022   • Surgical wound breakdown, sequela 10/28/2021   • Positive D dimer 10/02/2021   • Atherosclerosis of left leg ([facility] [address] ) 2021   • S/p left BKA (below knee amputation)  2021   • Bursitis of left knee 2021   • Morbid obesity 2021   • PAD (peripheral artery disease) 2021   • Diarrhea 2021   • Bilateral leg pain 2021   • Leg pain 2020   • Headache 2020   • Essential hypertension 2020   • Insulin-dependent diabetes mellitus with neuropathy 2020   • Paresthesia 2020   • COPD (chronic obstructive pulmonary disease) ([facility] [address] ) 2020   • Nicotine dependence 2020   • Bipolar disorder 2020      LOS (days): 24  Geometric Mean LOS (GMLOS) (days): 4 30  Days to GMLOS:-19 9     OBJECTIVE:  Risk of Unplanned Readmission Score: 24 73         Current admission status: Inpatient   Preferred Pharmacy:   [address] [place], [place] [address] [address]  Phone: [phone] Fax: [phone]    [facility] [place], [address] [facility] [place] [address] [place] [address] [address]  Phone: [phone] Fax: [phone] [address] [place], [place] [address] [facility] [address]  [address] [facility] [address]  Phone: [phone] Fax: [phone]    Primary Care Provider: Lukasz Starr MD    Primary Insurance: [facility]  Secondary Insurance: [address] [place] [address] Number: [id number]

## 2023-04-17 NOTE — CASE MANAGEMENT
Sabina Jaimedarrinlandy 51 has received approved authorization from insurance:  [address] in by Rep: [place] P#  [phone]  Authorization received for: SNF  Facility: [address] [place] [address] #: [id number] (Plan auth ID not yet generated)  Start of Care: 4/18  Next Review Date: 4/20  P#: [phone]  Submit next review to: [phone]   Care Manager notified: Arianne Jose

## 2023-04-17 NOTE — PLAN OF CARE
Problem: PHYSICAL THERAPY ADULT  Goal: Performs mobility at highest level of function for planned discharge setting  See evaluation for individualized goals  Description: Treatment/Interventions: OT, Spoke to case management, Spoke to nursing, Gait training, Bed mobility, Patient/family training, Endurance training, Functional transfer training, LE strengthening/ROM  Equipment Recommended: Wheelchair, Bienvenido Loser       See flowsheet documentation for full assessment, interventions and recommendations  Outcome: Progressing  Note: Prognosis: Good  Problem List: Decreased strength, Decreased range of motion, Decreased endurance, Impaired balance, Decreased mobility, Decreased safety awareness, Pain, Orthopedic restrictions  Assessment: Pt seen for PT treatment session this date  Therapy session focused on bed mobility, functional transfers, gait training and WC management/ mobility in order to improve overall mobility and independence  Pt requires assist of 1 for all mobility performed this date  Pt with fall over weekend, per pt sock was twisted and pt fell on residual limb- pt with some residual pain from incident  Pt's WC delivered this date, increased time required to educate pt on WC parts, pt with good understanding  Pt requiring min A for SPT/ ambulation this date using RW  Pt making progress toward goals  Pt was left sitting OOB in WC at the end of PT session with all needs in reach  Pt would benefit from continued PT services while in hospital to address remaining limitations  PT to continue to follow pt and recommends home with HHPT vs STR pending progress  The patient's AM-PAC Basic Mobility Inpatient Short Form Raw Score is 16  A Raw score of less than or equal to 16 suggests the patient may benefit from discharge to post-acute rehabilitation services  Please also refer to the recommendation of the Physical Therapist for safe discharge planning    Barriers to Discharge: Inaccessible home environment, Decreased caregiver support     PT Discharge Recommendation: Home with home health rehabilitation (vs STR pending progress)    See flowsheet documentation for full assessment

## 2023-04-17 NOTE — PLAN OF CARE
Problem: OCCUPATIONAL THERAPY ADULT  Goal: Performs self-care activities at highest level of function for planned discharge setting  See evaluation for individualized goals  Description: Treatment Interventions: ADL retraining, Functional transfer training, UE strengthening/ROM, Endurance training, Patient/family training, Compensatory technique education, Continued evaluation, Energy conservation, Activityengagement          See flowsheet documentation for full assessment, interventions and recommendations  Outcome: Progressing  Note: Limitation: Decreased ADL status, Decreased UE strength, Decreased Safe judgement during ADL, Decreased endurance, Decreased self-care trans, Decreased high-level ADLs  Prognosis: Good  Assessment: Patient participated in Skilled OT session this date with interventions consisting of ADL re training with the use of correct body mechnaics, Energy Conservation techniques, deep breathing technique, safety awareness and fall prevention techniques,  therapeutic activities to: increase activity tolerance, increase dynamic sit/ stand balance during functional activity  and increase OOB/ sitting tolerance   Upon arrival patient was found seated OOB to W/C  Pt demonstrated the following tasks: S STS with RW, S for navigating WC on straightaways, CGA for navigating around obstacles, increased cueing for turns  Pt dons and doffs pants with MIN A, cues for safety  Increased difficulty threading over R foot, CGA to maintain stance while pulling up over hips  Education provided regarding WC management, including managing leg rests for ADL tasks  Patient continues to be functioning below baseline level, occupational performance remains limited secondary to factors listed above and increased risk for falls and injury  From OT standpoint, recommendation at time of d/c would be home with skilled therapy vs STR - pending progress    Patient to benefit from continued Occupational Therapy treatment while in the hospital to address deficits as defined above and maximize level of functional independence with ADLs and functional mobility  Pt was left after session with all current needs met  The patient's raw score on the AM-PAC Daily Activity Inpatient Short Form is 21  A raw score of greater than or equal to 19 suggests the patient may benefit from discharge to home  Please refer to the recommendation of the Occupational Therapist for safe discharge planning       OT Discharge Recommendation: Home with home health rehabilitation (vs STR - pending progress)

## 2023-04-17 NOTE — CASE MANAGEMENT
Case Management Discharge Planning Note    Patient name Endy Echevarria  Location [facility] 904/[facility] 945-69 MRN [id number]  : 1985 Date 2023       Current Admission Date: 3/24/2023  Current Admission Diagnosis:S/p left BKA (below knee amputation)    Patient Active Problem List    Diagnosis Date Noted   • Status post fall 2023   • Postoperative pain 2023   • Pyuria 2023   • Postoperative blood loss anemia 2023   • Leukocytosis 2023   • Acute respiratory distress 2023   • Class 3 severe obesity due to excess calories without serious comorbidity with body mass index (BMI) of 40 0 to 44 9 in [facility]) 2022   • Surgical wound breakdown, sequela 10/28/2021   • Positive D dimer 10/02/2021   • Atherosclerosis of left leg ([facility] [address] ) 2021   • S/p left BKA (below knee amputation)  2021   • Bursitis of left knee 2021   • Morbid obesity 2021   • PAD (peripheral artery disease) 2021   • Diarrhea 2021   • Bilateral leg pain 2021   • Leg pain 2020   • Headache 2020   • Essential hypertension 2020   • Insulin-dependent diabetes mellitus with neuropathy 2020   • Paresthesia 2020   • COPD (chronic obstructive pulmonary disease) ([facility] [address] ) 2020   • Nicotine dependence 2020   • Bipolar disorder 2020      LOS (days): 24  Geometric Mean LOS (GMLOS) (days): 4 30  Days to GMLOS:-19 7     OBJECTIVE:  Risk of Unplanned Readmission Score: 24 68         Current admission status: Inpatient   [facility] Pharmacy[facility]   [facility] [address]  Phone: [phone] Fax: [phone]    [facility] ([place] - [place], [address] [facility] [place] [address] [place] [address] [address]  Phone: [phone] Fax: [phone] [address] [place], [address] [facility] [address]  [address] [facility] [place] [address] [place] [address]  Phone: [phone] Fax: [phone]    Primary Care Provider: Anshul Tom MD    [facility]  Secondary Insurance: [address] [place] [address],[address] DETAILS:    Auth request submitted via  support for [employer] received  PT notified    [place] notified via [employer]

## 2023-04-17 NOTE — UTILIZATION REVIEW
Continued Stay Review    Date: 4/16/2023                      Current Patient Class: inpatient  Current Level of Care: med/surg  HPI:37 y o  female initially admitted on 3/24 s/p L BKA on 3/24    Assessment/Plan: Pt sustained a fall early this morning landing near stump site with increased stump pain  XR ordered, pending  Has been evaluated by pain management and was weaned off IV breakthrough agents  Unfortunately in past few days, pt continues to report intermittent spikes in uncontrolled pain with just current oral regimen -> d/t persistence of pain/discomfort, has been given intermittent doses of IV Dilaudid for breakthrough  D/t this morning and landing near stump site, given additional IV agent doses today (pending imaging)  APS consulted re further pain med adjustments  Blood sugars running below goal  Decrease Lantus to 45 units nightly  Continue Humalog at the current regimen of 20 units of breakfast, 20 units with lunch, 12 units with dinner  Continue scale  Endocrine following  Continue other po meds  Hgb stable  Plan to d/c to IP rehab when pain controlled       Vital Signs:   Date/Time Temp Pulse Resp BP MAP (mmHg) SpO2 O2 Device   04/16/23 21:53:09 -- 109 Abnormal  -- 128/81 97 98 % --   04/16/23 2153 -- -- -- 128/81 -- -- --   04/16/23 2100 -- -- -- -- -- -- None (Room air)   04/16/23 20:58:30 97 5 °F (36 4 °C) 108 Abnormal  18 118/70 86 96 % --   04/16/23 14:25:02 96 6 °F (35 9 °C) Abnormal  105 20 114/68 83 94 % --   04/16/23 07:19:45 96 8 °F (36 °C) Abnormal  94 17 108/64 79 95 % --   04/16/23 05:24:22 -- 98 -- 133/88 103 99 % --   04/15/23 21:42:17 -- 96 18 125/71 89 97 % --   04/15/23 2100 -- -- -- -- -- 97 % None (Room air)   04/15/23 16:37:13 97 3 °F (36 3 °C) Abnormal  96 18 132/78 96 96 % --   04/15/23 07:29:20 96 6 °F (35 9 °C) Abnormal  90 17 134/79 97 98 % --     Pertinent Labs/Diagnostic Results:     Results from last 7 days   Lab Units 04/16/23  0535 04/15/23  0521 04/14/23  0500 04/13/23  0552   WBC Thousand/uL 7 07 7 87 7 68 8 08   HEMOGLOBIN g/dL 9 0* 9 0* 8 9* 8 5*   HEMATOCRIT % 26 3* 26 4* 25 6* 25 4*   PLATELETS Thousands/uL 419* 417* 409* 385   NEUTROS ABS Thousands/µL 4 19 4 79 4 77 5 08     Results from last 7 days   Lab Units 04/16/23  2056 04/16/23  1622 04/16/23  1100 04/16/23  0747 04/16/23  0513 04/15/23  2140 04/15/23  1632 04/15/23  1127 04/15/23  0732   POC GLUCOSE mg/dl 141* 108 127 148* 142* 90 141* 85 115     Medications:   Scheduled Medications:  acetaminophen, 975 mg, Oral, Q8H ABDULKADIR  aspirin, 81 mg, Oral, Daily  atorvastatin, 40 mg, Oral, Daily With Dinner  DULoxetine, 20 mg, Oral, Daily  fluticasone-vilanterol, 1 puff, Inhalation, Daily  gabapentin, 300 mg, Oral, TID  insulin glargine, 45 Units, Subcutaneous, HS  insulin lispro, 20 Units, Subcutaneous, Daily With Lunch   And  insulin lispro, 12 Units, Subcutaneous, Daily With Dinner  insulin lispro, 2-12 Units, Subcutaneous, TID AC  insulin lispro, 2-12 Units, Subcutaneous, HS  insulin lispro, 20 Units, Subcutaneous, Daily With Breakfast  lisinopril, 20 mg, Oral, Daily  melatonin, 6 mg, Oral, HS  methocarbamol, 750 mg, Oral, Q6H ABDULKADIR  nicotine, 21 mg, Transdermal, Daily  polyethylene glycol, 17 g, Oral, Daily  prazosin, 1 mg, Oral, HS  QUEtiapine, 200 mg, Oral, HS  rivaroxaban, 15 mg, Oral, BID With Meals  [START ON 4/21/2023] rivaroxaban, 20 mg, Oral, Daily With Breakfast  senna-docusate sodium, 2 tablet, Oral, BID  topiramate, 25 mg, Oral, BID    PRN Meds:  albuterol, 2 puff, Inhalation, Q4H PRN  bisacodyl, 10 mg, Rectal, Daily PRN  diazepam, 2 mg, Oral, Q6H PRN 4/16 x1  hydrALAZINE, 10 mg, Intravenous, Q6H PRN  HYDROmorphone, 2 mg, Oral, Q4H PRN   Or  HYDROmorphone, 4 mg, Oral, Q4H PRN 4/15 x4, 4/16 x4  naloxone, 0 04 mg, Intravenous, Q1MIN PRN  simethicone, 80 mg, Oral, Q6H PRN  white petrolatum-mineral oil, , Topical, TID PRN      Discharge Plan: tentative [facility]    Network Utilization Review Department  ATTENTION: Please call with any questions or concerns to [phone] and carefully listen to the prompts so that you are directed to the right person  All voicemails are confidential   Alexys Palma all requests for admission clinical reviews, approved or denied determinations and any other requests to dedicated fax number below belonging to the campus where the patient is receiving treatment   List of dedicated fax numbers for the Facilities:  [address] DENIALS (Administrative/Medical Necessity) [phone]   [address] (Maternity/NICU/Pediatrics) [phone]   0 Jana Andrea [phone]   [facility] [phone]   [address] [place] [address] [address] [place] [address] [phone]   [facility] 134 815 [facility] [phone]

## 2023-04-18 PROBLEM — K59.00 CONSTIPATION: Status: ACTIVE | Noted: 2023-04-18

## 2023-04-18 NOTE — PROGRESS NOTES
[facility]25 [facility]  Progress Note  Name: Alonso Segovia I  MRN: [id number]  Unit/Bed#: [facility]PHP 904-01 I Date of Admission: 3/24/2023   Date of Service: 4/18/2023 I Hospital Day: 25    Assessment/Plan   * S/p left BKA (below knee amputation)   Assessment & Plan  S/p left BKA by vascular surgery on 3/24  Continue ASA/statin in the setting of underlying/predisposing PAD  Appreciate pain management input regarding multimodal analgesic optimization/titration  Ongoing PT/OT evaluation -> DC planning to SNF tomorrow upon bed availability    PAD (peripheral artery disease)  Assessment & Plan  Hx of occluded bypass and profunda thrombus  Continue ASA/statin/Xarelto  Appreciate vascular surgery input  Appreciate hematology input -> thrombophilia evaluation as outpatient as deemed no need to obtain here as has been already on anticoagulation, hence, likely to skew results    Constipation  Assessment & Plan  Secondary to opiates  Given Relistor yesterday  Status post aggressive bowel regimen today including enema, lactulose, increase of MiraLAX with resulting bowel movement    Postoperative pain  Assessment & Plan  Has been evaluated by pain management and was weaned off IV breakthrough agents prior to the weekend  Unfortunately, over the weekend, patient continued to report intermittent spikes in uncontrolled pain with just current oral regimen -> due to persistence of pain/discomfort, has been given intermittent doses of IV Dilaudid for breakthrough  IV Dilaudid discontinued    Status post fall  Assessment & Plan  Patient sustained a fall yesterday morning recollecting landing near stump site with increased stump pain  Status post x-ray    Postoperative blood loss anemia  Assessment & Plan  Hemoglobin currently stable at 9 0 over the last few days -> transfuse if drops < 7 0    Morbid obesity  Assessment & Plan  BMI of 53 57 currently  Lifestyle/diet modifications    Bipolar disorder  Assessment & Plan  Continue Seroquel/Topamax  Appreciate psychiatry input    Nicotine dependence  Assessment & Plan  Cessation counseling  Transdermal nicotine patch on board    Insulin-dependent diabetes mellitus with neuropathy  Assessment & Plan  Lab Results   Component Value Date    HGBA1C 9 8 (A) 2022     Appreciate endocrinology input  Continue basal/prandial insulin with additional SSI coverage per Accu-Cheks as titrated by endocrinology  On Gabapentin for neuropathy  History of poor dietary compliance noted  Hypoglycemia protocol   Carbohydrate restriction enforced    Essential hypertension  Assessment & Plan  Continue Zestril/Minipress  Optimize pain control           VTE Pharmacologic Prophylaxis:   Pharmacologic: Rivaroxaban (Xarelto)  Mechanical VTE Prophylaxis in Place: No    Patient Centered Rounds: I have performed bedside rounds with nursing staff today  Discussions with Specialists or Other Care Team Provider:     Education and Discussions with Family / Patient: Patient    Time Spent for Care: 30 minutes  More than 50% of total time spent on counseling and coordination of care as described above  Current Length of Stay: 25 day(s)    Current Patient Status: Inpatient   Certification Statement: The patient will continue to require additional inpatient hospital stay due to Awaiting placement    Discharge Plan: DC planning to SNF tomorrow pending bed availability    Code Status: Level 1 - Full Code      Subjective:   Complains of constipation  Had refused suppository yesterday  Now agreeable to have enema  Denies abdominal pain  Positive flatus  Objective:     Vitals:   Temp (24hrs), Av 3 °F (36 3 °C), Min:97 2 °F (36 2 °C), Max:97 3 °F (36 3 °C)    Temp:  [97 2 °F (36 2 °C)-97 3 °F (36 3 °C)] 97 3 °F (36 3 °C)  HR:  [] 105  Resp:  [16-19] 16  BP: (124-139)/(76-94) 127/78  SpO2:  [97 %-99 %] 97 %  Body mass index is 54 07 kg/m²       Input and Output Summary (last 24 hours): Intake/Output Summary (Last 24 hours) at 4/18/2023 1829  Last data filed at 4/18/2023 1601  Gross per 24 hour   Intake --   Output 600 ml   Net -600 ml       Physical Exam:     Physical Exam  Constitutional:       Appearance: She is obese  Cardiovascular:      Rate and Rhythm: Normal rate and regular rhythm  Pulses: Normal pulses  Heart sounds: Normal heart sounds  Pulmonary:      Effort: Pulmonary effort is normal  No respiratory distress  Breath sounds: Normal breath sounds  No wheezing or rales  Abdominal:      General: Bowel sounds are normal  There is no distension  Palpations: Abdomen is soft  Tenderness: There is no abdominal tenderness  Musculoskeletal:      Cervical back: Normal range of motion and neck supple  Left lower leg: No edema  Comments: Left BKA   Skin:     General: Skin is warm and dry  Neurological:      General: No focal deficit present  Mental Status: She is alert  Mental status is at baseline  Cranial Nerves: No cranial nerve deficit  Motor: No weakness  Additional Data:     Labs:    Results from last 7 days   Lab Units 04/18/23  0834   WBC Thousand/uL 7 36   HEMOGLOBIN g/dL 9 2*   HEMATOCRIT % 27 0*   PLATELETS Thousands/uL 424*   NEUTROS PCT % 65   LYMPHS PCT % 23   MONOS PCT % 6   EOS PCT % 5             Results from last 7 days   Lab Units 04/18/23  1717 04/18/23  1105 04/18/23  0820 04/17/23  2043 04/17/23  1654 04/17/23  1457 04/17/23  1149 04/17/23  0827 04/16/23  2056 04/16/23  1622 04/16/23  1100 04/16/23  0747   POC GLUCOSE mg/dl 160* 255* 204* 219* 146* 64* 155* 133 141* 108 127 148*                   * I Have Reviewed All Lab Data Listed Above  * Additional Pertinent Lab Tests Reviewed:  All Labs Within Last 24 Hours Reviewed    Imaging:    Imaging Reports Reviewed Today Include:   Imaging Personally Reviewed by Myself Includes:      Recent Cultures (last 7 days):           Last 24 Hours Medication List:   Current Facility-Administered Medications   Medication Dose Route Frequency Provider Last Rate   • acetaminophen  975 mg Oral Q8H Francisco Paul MD     • albuterol  2 puff Inhalation Q4H PRN Yessi Guerrero MD     • aspirin  81 mg Oral Daily Yessi Guerrero MD     • atorvastatin  40 mg Oral Daily With Marquise Ha MD     • bisacodyl  10 mg Rectal Daily PRN Yessi Guerrero MD     • diazepam  2 mg Oral Q6H PRN Janett Ann PA-C     • DULoxetine  20 mg Oral Daily Rhina Meuse, CRNP     • fluticasone-vilanterol  1 puff Inhalation Daily Yessi Gurerero MD     • gabapentin  300 mg Oral TID Rhina Meuse, CRNP     • hydrALAZINE  10 mg Intravenous Q6H PRN Yessi Guerrero MD     • HYDROmorphone  2 mg Oral Q4H PRN Rhina Meuse, CRNP      Or   • HYDROmorphone  4 mg Oral Q4H PRN Rhina Meuse, CRNP     • insulin glargine  45 Units Subcutaneous HS Emma Donaldson DO     • insulin lispro  20 Units Subcutaneous Daily With Lunch Zo Ruts MD      And   • insulin lispro  12 Units Subcutaneous Daily With Faby Aguilar MD     • insulin lispro  2-12 Units Subcutaneous TID AC Yessi Guerrero MD     • insulin lispro  2-12 Units Subcutaneous HS Yessi Guerrero MD     • insulin lispro  20 Units Subcutaneous Daily With Breakfast Zo Rust MD     • lisinopril  20 mg Oral Daily Aaliyah Agudelo DO     • melatonin  6 mg Oral HS Yessi Guerrero MD     • methocarbamol  750 mg Oral Q6H [address] Rhina Meuse, CRNP     • naloxone  0 04 mg Intravenous Q1MIN PRN Yessi Guerrero MD     • nicotine  21 mg Transdermal Daily Yessi Guerrero MD     • polyethylene glycol  17 g Oral BID Vianney Chambers DO     • prazosin  1 mg Oral HS Yessi Guerrero MD     • QUEtiapine  200 mg Oral HS Yessi Guerrero MD • rivaroxaban  15 mg Oral BID With Meals Wendy Rangel DO     • [START ON 4/21/2023] rivaroxaban  20 mg Oral Daily With Breakfast Wendy Rangel DO     • senna-docusate sodium  2 tablet Oral BID Elner Duane, MD     • simethicone  80 mg Oral Q6H PRN Elner Duane, MD     • topiramate  25 mg Oral BID Elner Duane, MD     • white petrolatum-mineral oil   Topical TID PRN Wendy Rangel DO          Today, Patient Was Seen By: Wendy Rangel DO    ** Please Note: Dictation voice to text software may have been used in the creation of this document   **

## 2023-04-18 NOTE — ASSESSMENT & PLAN NOTE
Secondary to opiates  Given Relistor yesterday  Status post aggressive bowel regimen today including enema, lactulose, increase of MiraLAX with resulting bowel movement

## 2023-04-18 NOTE — CASE MANAGEMENT
Case Management Discharge Planning Note    Patient name Jaci Ramos  Location [facility] 904/[facility] 817-49 MRN [id number]  : 1985 Date 2023       Current Admission Date: 3/24/2023  Current Admission Diagnosis:S/p left BKA (below knee amputation)    Patient Active Problem List    Diagnosis Date Noted   • Status post fall 2023   • Postoperative pain 2023   • Pyuria 2023   • Postoperative blood loss anemia 2023   • Leukocytosis 2023   • Acute respiratory distress 2023   • Class 3 severe obesity due to excess calories without serious comorbidity with body mass index (BMI) of 40 0 to 44 9 in [facility]) 2022   • Surgical wound breakdown, sequela 10/28/2021   • Positive D dimer 10/02/2021   • Atherosclerosis of left leg ([facility] [address] ) 2021   • S/p left BKA (below knee amputation)  2021   • Bursitis of left knee 2021   • Morbid obesity 2021   • PAD (peripheral artery disease) 2021   • Diarrhea 2021   • Bilateral leg pain 2021   • Leg pain 2020   • Headache 2020   • Essential hypertension 2020   • Insulin-dependent diabetes mellitus with neuropathy 2020   • Paresthesia 2020   • COPD (chronic obstructive pulmonary disease) ([facility] [address] ) 2020   • Nicotine dependence 2020   • Bipolar disorder 2020      LOS (days): 25  Geometric Mean LOS (GMLOS) (days): 4 30  Days to GMLOS:-20 8     OBJECTIVE:  Risk of Unplanned Readmission Score: 25         Current admission status: Inpatient   Preferred Pharmacy:   [address] [place] [address] [facility] [address]  Phone: [phone] Fax: [phone]    [facility] - [place], [address] [place]  [address]  [address] [facility] [address] [address]  Phone: [phone] Fax: [phone] [address] [place], [place] [address] [facility]  Phone: [phone] Fax: [phone]    Primary Care Provider: Sathish Farris MD    Primary Insurance: [facility]  Secondary Insurance: [place] [address] [place] [address] DETAILS:    Per Sergio, [facility], no bed today    Possible tomorrow  Reina Granado was obtained from insurance    Covid normal    PT notified

## 2023-04-18 NOTE — PROGRESS NOTES
"  Progress Note - Nelida Israel 40 y o  female MRN: [id number]    Unit/Bed#: [facility] 904-01 Encounter: [id number]      CC: diabetes f/u    Subjective:   Nelida Israel is a 40y o  year old female with type 2 diabetes admitted s/p left BKA  Had an episode of hypoglycemia (glucose 64 mg/dl ) yesterday which she attributed to getting scheduled premeal insulin without eating (skipped lunch)  Also been skipping doses of premeal insulin as she reportedly doesn't eat on time  Objective:     Vitals: Blood pressure 125/79, pulse 94, temperature (!) 97 2 °F (36 2 °C), resp  rate 19, height 5' 1\" (1 549 m), weight 130 kg (286 lb 2 5 oz), SpO2 99 %, not currently breastfeeding  ,Body mass index is 54 07 kg/m²  Intake/Output Summary (Last 24 hours) at 4/18/2023 1248  Last data filed at 4/18/2023 0837  Gross per 24 hour   Intake --   Output 500 ml   Net -500 ml       Physical Exam:  General Appearance: awake, appears stated age and cooperative  Head: Normocephalic, without obvious abnormality, atraumatic  Extremities: lf BKA  Skin: Skin color and temperature normal    Pulm: no labored breathing    Lab, Imaging and other studies: I have personally reviewed pertinent reports  POC Glucose (mg/dl)   Date Value   04/18/2023 255 (H)   04/18/2023 204 (H)   04/17/2023 219 (H)   04/17/2023 146 (H)   04/17/2023 64 (L)   04/17/2023 155 (H)   04/17/2023 133   04/16/2023 141 (H)   04/16/2023 108   04/16/2023 127       Assessment and plan:    #Type 2 diabetes  A1c 9 8%  Home regimen:metformin 1000 mg twice daily, Lantus 20 units twice daily, lispro 21 units 3 times daily with meals, Trulicity 1 5 mg weekly  Inpatient regimen: Lantus 45 units at bedtime, Humalog 20/20/12 with meals with correctional insulin  Recommendation: Continue on current insulin regimen  Discussed with RN and patient that okay to give humalog with/just after meals instead of skipping doses   Plan to give 50% premeal insulin dose if she eats only 25 - 50 " of meals and full dose if she eats >75% meals  Portions of the record may have been created with voice recognition software

## 2023-04-19 PROBLEM — Z89.612 S/P AKA (ABOVE KNEE AMPUTATION) UNILATERAL, LEFT (HCC): Status: ACTIVE | Noted: 2023-04-19

## 2023-04-19 NOTE — ASSESSMENT & PLAN NOTE
Hx of occluded bypass and profunda thrombus  Continue ASA/statin/Xarelto  S/p vascular surgery consult  Cleared from their standpoint   F/u as outpt  S/p hematology consult -> thrombophilia evaluation as outpatient as deemed no need to obtain here as has been already on anticoagulation, hence, likely to skew results

## 2023-04-19 NOTE — ASSESSMENT & PLAN NOTE
S/p left BKA by vascular surgery on 3/24  Continue ASA/statin in the setting of underlying/predisposing PAD  Appreciate pain management input regarding multimodal analgesic optimization/titration  S/p PT/OT evaluation -> DC planning to SNF today

## 2023-04-19 NOTE — PROGRESS NOTES
[facility]  Progress Note  Name: Myrtle Greenwood  MRN: [id number]  [facility] Date of Admission: 3/24/2023   Date of Service: 4/19/2023 I Hospital Day: 32    Assessment/Plan   * S/P AKA (above knee amputation) unilateral, left [facility])  Assessment & Plan  S/p left AKA by vascular surgery on 3/24  Continue ASA/statin in the setting of underlying/predisposing PAD  Appreciate pain management input regarding multimodal analgesic optimization/titration  S/p PT/OT evaluation -> DC planning to SNF today    PAD (peripheral artery disease)  Assessment & Plan  Hx of occluded bypass and profunda thrombus  Continue ASA/statin/Xarelto  S/p vascular surgery consult  Cleared from their standpoint  F/u as outpt  S/p hematology consult -> thrombophilia evaluation as outpatient as deemed no need to obtain here as has been already on anticoagulation, hence, likely to skew results    Constipation  Assessment & Plan  Opioid induced  Resolved  Cont bowel regimen    Postoperative pain  Assessment & Plan  Has been evaluated by pain management and was weaned off IV breakthrough agents prior to the weekend  Cont prn po dilaudid    Status post fall  Assessment & Plan  Patient sustained a fall 2 days ago landing near stump site  Status post x-ray; no injury    Postoperative blood loss anemia  Assessment & Plan  H/h stable  Did not require transfusion    Morbid obesity  Assessment & Plan  BMI of 53 57 currently  Lifestyle/diet modifications    Bipolar disorder  Assessment & Plan  Continue Seroquel/Topamax  S/p psychiatry consult    Nicotine dependence  Assessment & Plan  Cessation counseling  Transdermal nicotine patch on board    Insulin-dependent diabetes mellitus with neuropathy  Assessment & Plan  Lab Results   Component Value Date    HGBA1C 9 8 (A) 12/20/2022     Endocrinology following  D/c on insulin regimen as per their recommendations   Metformin, trulicity discontinued  F/u as outpt  History of poor dietary compliance noted  Educated at [facility]      Essential hypertension  [address]  Controlled        Discharging Physician / Practitioner: Laura Berman DO  PCP: Madelaine Healy MD  Admission Date:   Admission Orders (From admission, onward)     Ordered        03/24/23 1242  Inpatient Admission  Once                      Discharge Date: 04/19/23    Medical Problems     Resolved Problems  Date Reviewed: 4/19/2023   None         Consultations During Hospital Stay:  · Vascular surgery  · Endocrinology  · Acute pain service  · Behavioral health  · PMR  · Cardiology  · Hematology oncology  · Critical care    Procedures Performed:   · Left BKA  · Left lower extremity angiogram  · Peripheral block  · Endotracheal intubation    Significant Findings / Test Results:   · See above    Incidental Findings:   ·      Test Results Pending at Discharge (will require follow up):   ·      Outpatient Tests Requested:  · CBC, BMP      Complications: None    Reason for Admission:     Hospital Course:     Shakeel Molina is a 40 y o  female patient who originally presented to the hospital on 3/24/2023 for LLE angiogram which revealed occluded bypass graft with no reasonable distal target for revascularization  Had prolonged hospital course which required admission also to ICU due to respiratory failure requiring intubation  She eventually had left upper extremity BKA  Please see above list of diagnoses and related plan for additional information  Condition at Discharge: stable     Discharge Day Visit / Exam:     Subjective: Patient seen and examined at bedside  Had another bowel movement today  Thoughts refused to collect suppository  No acute events overnight  She did have a large bowel movement yesterday as well  Denies abdominal pain, nausea or vomiting    She had short bread cookies at bedside which are educated at length that its against her "diabetic diet  Vitals: Blood Pressure: 120/79 (04/19/23 0738)  Pulse: 99 (04/19/23 0738)  Temperature: (!) 97 3 °F (36 3 °C) (04/19/23 0738)  Temp Source: Temporal (04/11/23 1533)  Respirations: 16 (04/19/23 0738)  Height: 5' 1\" (154 9 cm) (03/24/23 1300)  Weight - Scale: 130 kg (286 lb 2 5 oz) (04/17/23 0541)  SpO2: 98 % (04/19/23 0738)  Exam:   Physical Exam  Constitutional:       Appearance: She is obese  Cardiovascular:      Rate and Rhythm: Normal rate and regular rhythm  Pulses: Normal pulses  Heart sounds: Normal heart sounds  Pulmonary:      Effort: Pulmonary effort is normal  No respiratory distress  Breath sounds: Normal breath sounds  No wheezing or rales  Abdominal:      General: Bowel sounds are normal  There is no distension  Palpations: Abdomen is soft  Tenderness: There is no abdominal tenderness  There is no guarding  Musculoskeletal:      Cervical back: Normal range of motion and neck supple  Right lower leg: No edema  Left lower leg: No edema  Comments: Left AKA   Skin:     General: Skin is warm and dry  Neurological:      General: No focal deficit present  Mental Status: She is alert and oriented to person, place, and time  Mental status is at baseline  Cranial Nerves: No cranial nerve deficit  Motor: No weakness  Discussion with Family: Plan of care discussed with patient at length  Also called in significant other [facility]    Discharge instructions/Information to patient and family:   See after visit summary for information provided to patient and family  Provisions for Follow-Up Care:  See after visit summary for information related to follow-up care and any pertinent home health orders        Disposition:     Other [facility] at [address] [place] [address] to [facility] SNF:   · Not Applicable to this Patient - Not Applicable to this Patient    Planned Readmission: No     Discharge " Statement:  I spent 35 minutes discharging the patient  This time was spent on the day of discharge  I had direct contact with the patient on the day of discharge  Greater than 50% of the total time was spent examining patient, answering all patient questions, arranging and discussing plan of care with patient as well as directly providing post-discharge instructions  Additional time then spent on discharge activities  Discharge Medications:  See after visit summary for reconciled discharge medications provided to patient and family        ** Please Note: This note has been constructed using a voice recognition system **

## 2023-04-19 NOTE — ASSESSMENT & PLAN NOTE
S/p left AKA by vascular surgery on 3/24  Continue ASA/statin in the setting of underlying/predisposing PAD  Appreciate pain management input regarding multimodal analgesic optimization/titration  S/p PT/OT evaluation -> DC planning to SNF today

## 2023-04-19 NOTE — CASE MANAGEMENT
Case Management Discharge Planning Note    Patient name Carlitos Rosenberg  Location [facility] 904/[facility] 669-05 MRN [id number]  : 1985 Date 2023       Current Admission Date: 3/24/2023  Current Admission Diagnosis:S/p left BKA (below knee amputation)    Patient Active Problem List    Diagnosis Date Noted   • Constipation 2023   • Status post fall 2023   • Postoperative pain 2023   • Pyuria 2023   • Postoperative blood loss anemia 2023   • Leukocytosis 2023   • Acute respiratory distress 2023   • Class 3 severe obesity due to excess calories without serious comorbidity with body mass index (BMI) of 40 0 to 44 9 in adult [facility]) 2022   • Surgical wound breakdown, sequela 10/28/2021   • Positive D dimer 10/02/2021   • Atherosclerosis of left leg ([facility] [address] ) 2021   • S/p left BKA (below knee amputation)  2021   • Bursitis of left knee 2021   • Morbid obesity 2021   • PAD (peripheral artery disease) 2021   • Diarrhea 2021   • Bilateral leg pain 2021   • Leg pain 2020   • Headache 2020   • Essential hypertension 2020   • Insulin-dependent diabetes mellitus with neuropathy 2020   • Paresthesia 2020   • COPD (chronic obstructive pulmonary disease) ([facility] [address] ) 2020   • Nicotine dependence 2020   • Bipolar disorder 2020      LOS (days): 26  Geometric Mean LOS (GMLOS) (days): 4 30  Days to GMLOS:-21 6     OBJECTIVE:  Risk of Unplanned Readmission Score: 25 28         Current admission status: Inpatient   Preferred Pharmacy:   [address] [place], [place] [address] [address]  Phone: [phone] Fax: [phone]    [facility] - [place], [address] [facility] Zaheer Sherri 1560  [address] [facility] [address] [address]  Phone: [phone] Fax: [phone]    [address] [place], [facility] [address] Marquise Chappell  Phone: [phone] Fax: [phone]    Primary Care Provider: Zechariah Kimball MD    Primary Insurance: Euna Mouse [address]  Secondary Insurance: [place] [address]    DISCHARGE DETAILS:    DC today  Transport requested Roundtrip    Transport 2PM, pt notified, facility, Sergio notified    Ambulance can take rollator, not WC  TCT , they will take to facility, to bring WC to mailroom  Unit clerk will take down                                                    Treatment Team Recommendation: Short Term Rehab  Discharge Destination Plan[de-identified] Short Term Rehab  Transport at Discharge : S Ambulance  Dispatcher Contacted: Yes  Number/Name of Dispatcher: India Childress by [employer] and Unit #):  SLETS  ETA of Transport (Date): 04/19/23  ETA of Transport (Time): [address] [facility] [address] Name, [address] : Prosper Ramírez, [address] [facility]  Receiving Facility/Agency Phone Number: [phone]  Facility/Agency Fax Number: [phone]

## 2023-04-19 NOTE — PLAN OF CARE
Problem: MOBILITY - ADULT  Goal: Maintain or return to baseline ADL function  Description: INTERVENTIONS:  -  Assess patient's ability to carry out ADLs; assess patient's baseline for ADL function and identify physical deficits which impact ability to perform ADLs (bathing, care of mouth/teeth, toileting, grooming, dressing, etc )  - Assess/evaluate cause of self-care deficits   - Assess range of motion  - Assess patient's mobility; develop plan if impaired  - Assess patient's need for assistive devices and provide as appropriate  - Encourage maximum independence but intervene and supervise when necessary  - Involve family in performance of ADLs  - Assess for home care needs following discharge   - Consider OT consult to assist with ADL evaluation and planning for discharge  - Provide patient education as appropriate  Outcome: Progressing  Goal: Maintains/Returns to pre admission functional level  Description: INTERVENTIONS:  - Perform BMAT or MOVE assessment daily    - Set and communicate daily mobility goal to care team and patient/family/caregiver     - Collaborate with rehabilitation services on mobility goals if consulted  Outcome: Progressing     Problem: Prexisting or High Potential for Compromised Skin Integrity  Goal: Skin integrity is maintained or improved  Description: INTERVENTIONS:  - Identify patients at risk for skin breakdown  - Assess and monitor skin integrity  - Assess and monitor nutrition and hydration status  - Monitor labs   - Assess for incontinence   - Turn and reposition patient  - Assist with mobility/ambulation  - Relieve pressure over bony prominences  - Avoid friction and shearing  - Provide appropriate hygiene as needed including keeping skin clean and dry  - Evaluate need for skin moisturizer/barrier cream  - Collaborate with interdisciplinary team   - Patient/family teaching  - Consider wound care consult   Outcome: Progressing

## 2023-04-19 NOTE — ASSESSMENT & PLAN NOTE
Lab Results   Component Value Date    HGBA1C 9 8 (A) 12/20/2022     Endocrinology following  D/c on insulin regimen as per their recommendations  Metformin, trulicity discontinued  F/u as outpt  History of poor dietary compliance noted   Educated at [facility]

## 2023-04-19 NOTE — ASSESSMENT & PLAN NOTE
Has been evaluated by pain management and was weaned off IV breakthrough agents prior to the weekend  Cont prn po dilaudid

## 2023-04-19 NOTE — DISCHARGE SUMMARY
[address] [facility]  Discharge- Kamala Jackson 1985, 40 y o  female MRN: [id number]  Unit/Bed#: [facility] 904-01 Encounter: [id number]  Primary Care Provider: Vj Zhang MD   Date and time admitted to hospital: 3/24/2023  9:07 AM    * S/P AKA (above knee amputation) unilateral, left [facility])  Assessment & Plan  S/p left AKA by vascular surgery on 3/24  Continue ASA/statin in the setting of underlying/predisposing PAD  Appreciate pain management input regarding multimodal analgesic optimization/titration  S/p PT/OT evaluation -> DC planning to SNF today    PAD (peripheral artery disease)  Assessment & Plan  Hx of occluded bypass and profunda thrombus  Continue ASA/statin/Xarelto  S/p vascular surgery consult  Cleared from their standpoint  F/u as outpt  S/p hematology consult -> thrombophilia evaluation as outpatient as deemed no need to obtain here as has been already on anticoagulation, hence, likely to skew results    Constipation  Assessment & Plan  Opioid induced  Resolved  Cont bowel regimen    Postoperative pain  Assessment & Plan  Has been evaluated by pain management and was weaned off IV breakthrough agents prior to the weekend  Cont prn po dilaudid    Status post fall  Assessment & Plan  Patient sustained a fall 2 days ago landing near stump site  Status post x-ray; no injury    Postoperative blood loss anemia  Assessment & Plan  H/h stable  Did not require transfusion    Morbid obesity  Assessment & Plan  BMI of 53 57 currently  Lifestyle/diet modifications    Bipolar disorder  Assessment & Plan  Continue Seroquel/Topamax  S/p psychiatry consult    Nicotine dependence  Assessment & Plan  Cessation counseling  Transdermal nicotine patch on board    Insulin-dependent diabetes mellitus with neuropathy  Assessment & Plan  Lab Results   Component Value Date    HGBA1C 9 8 (A) 12/20/2022     Endocrinology following  D/c on insulin regimen as per their recommendations  Metformin, trulicity discontinued  F/u as outpt  History of poor dietary compliance noted  Educated at [facility]      Essential hypertension  [address]  Controlled         Discharging Physician / Practitioner: Mario Rodriguez DO  PCP: Bhumi Bautista MD  Admission Date:       Admission Orders (From admission, onward)       Ordered         03/24/23 1242   Inpatient Admission  Once                         Discharge Date: 04/19/23         Medical Problems      Resolved Problems  Date Reviewed: 4/19/2023   None            Consultations During Hospital Stay:  • Vascular surgery  • Endocrinology  • Acute pain service  • Behavioral health  • PMR  • Cardiology  • Hematology oncology  • Critical care     Procedures Performed:   • Left BKA  • Left lower extremity angiogram  • Peripheral block  • Endotracheal intubation     Significant Findings / Test Results:   • See above     Incidental Findings:   •       Test Results Pending at Discharge (will require follow up):   •       Outpatient Tests Requested:  • CBC, BMP        Complications: None     Reason for Admission:      Hospital Course:      Avril Jacques is a 40 y o  female patient who originally presented to the hospital on 3/24/2023 for LLE angiogram which revealed occluded bypass graft with no reasonable distal target for revascularization  Had prolonged hospital course which required admission also to ICU due to respiratory failure requiring intubation  She eventually had left upper extremity BKA      Please see above list of diagnoses and related plan for additional information       Condition at Discharge: stable      Discharge Day Visit / Exam:      Subjective: Patient seen and examined at bedside  Had another bowel movement today  Thoughts refused to collect suppository  No acute events overnight  She did have a large bowel movement yesterday as well  Denies abdominal pain, nausea or vomiting    She had short "bread cookies at bedside which are educated at length that its against her diabetic diet  Vitals: Blood Pressure: 120/79 (04/19/23 0738)  Pulse: 99 (04/19/23 0738)  Temperature: (!) 97 3 °F (36 3 °C) (04/19/23 0738)  Temp Source: Temporal (04/11/23 1533)  Respirations: 16 (04/19/23 0738)  Height: 5' 1\" (154 9 cm) (03/24/23 1300)  Weight - Scale: 130 kg (286 lb 2 5 oz) (04/17/23 0541)  SpO2: 98 % (04/19/23 0738)  Exam:   Physical Exam  Constitutional:       Appearance: She is obese  Cardiovascular:      Rate and Rhythm: Normal rate and regular rhythm  Pulses: Normal pulses  Heart sounds: Normal heart sounds  Pulmonary:      Effort: Pulmonary effort is normal  No respiratory distress  Breath sounds: Normal breath sounds  No wheezing or rales  Abdominal:      General: Bowel sounds are normal  There is no distension  Palpations: Abdomen is soft  Tenderness: There is no abdominal tenderness  There is no guarding  Musculoskeletal:      Cervical back: Normal range of motion and neck supple  Right lower leg: No edema  Left lower leg: No edema  Comments: Left AKA   Skin:     General: Skin is warm and dry  Neurological:      General: No focal deficit present  Mental Status: She is alert and oriented to person, place, and time  Mental status is at baseline  Cranial Nerves: No cranial nerve deficit  Motor: No weakness           Discussion with Family: Plan of care discussed with patient at length   Also called in significant other Sherrie Layman     Discharge instructions/Information to patient and family:   See after visit summary for information provided to patient and family        Provisions for Follow-Up Care:  See after visit summary for information related to follow-up care and any pertinent home health orders        Disposition:      [address]:   • Not Applicable to this Patient - " Not Applicable to this Patient     Planned Readmission: No     Discharge Statement:  I spent 35 minutes discharging the patient  This time was spent on the day of discharge  I had direct contact with the patient on the day of discharge  Greater than 50% of the total time was spent examining patient, answering all patient questions, arranging and discussing plan of care with patient as well as directly providing post-discharge instructions    Additional time then spent on discharge activities      Discharge Medications:  See after visit summary for reconciled discharge medications provided to patient and family        ** Please Note: This note has been constructed using a voice recognition system **        Revision History

## 2023-04-19 NOTE — ASSESSMENT & PLAN NOTE
Lab Results   Component Value Date    HGBA1C 9 8 (A) 12/20/2022     Endocrinology following  D/c on insulin regimen as per their recommendations  Metformin, trulicity discontinued  F/u as outpt  History of poor dietary compliance noted   Educated at [facility]

## 2023-04-20 ENCOUNTER — TRANSITIONAL CARE MANAGEMENT (OUTPATIENT)
Dept: FAMILY MEDICINE CLINIC | Facility: CLINIC | Age: 38
End: 2023-04-20

## 2023-04-21 NOTE — UTILIZATION REVIEW
NOTIFICATION OF ADMISSION DISCHARGE   This is a Notification of Discharge from [address]  Please be advised that this patient has been discharge from our facility  Below you will find the admission and discharge date and time including the patient’s disposition  UTILIZATION REVIEW CONTACT:  Maria D Galicia  Utilization   Network Utilization Review Department  Phone: [phone] x carefully listen to the prompts  All voicemails are confidential   Email: [email or web address]     ADMISSION INFORMATION  PRESENTATION DATE: 3/24/2023  9:07 AM  OBERVATION ADMISSION DATE:   INPATIENT ADMISSION DATE: 3/24/23 12:42 PM   DISCHARGE DATE: 4/19/2023  2:27 PM   DISPOSITION:Non [facility] SNF/TCU/SNU    IMPORTANT INFORMATION:  Send all requests for admission clinical reviews, approved or denied determinations and any other requests to dedicated fax number below belonging to the campus where the patient is receiving treatment   List of dedicated fax numbers:  [address] DENIALS (Administrative/Medical Necessity) [phone]   [address] (Maternity/NICU/Pediatrics) [phone]   [facility] [phone]   [place] [address] [phone]   [address] [phone]   [address] [facility] [phone] [facility] [phone]   [address] [place] [address] 119 [phone]   [facility]  [phone]   [address] [facility] [phone]   [address] [place] [address] [address] [facility] [phone]

## 2023-04-24 ENCOUNTER — TELEPHONE (OUTPATIENT)
Dept: VASCULAR SURGERY | Facility: CLINIC | Age: 38
End: 2023-04-24

## 2023-04-24 NOTE — TELEPHONE ENCOUNTER
Vascular Nurse Navigator Post Op Call    Procedure:  Left - AMPUTATION ABOVE KNEE (AKA)    Date of Procedure:  3/29/23    Surgeon:     Becky Delgado MD - Primary     * Марина Reagan MD - Roxi Herreri Son Kiet Prado MD - Assisting    Discharge Date:  4/19/23    Discharge Disposition: Rehab - [facility]    Chest Pain?:No    Shortness of Breath?:No     Increased Pain, Swelling, Warmth, or Redness? :No    Purulent Drainage?:No    Foul- smelling drainage?: No    Signs of dehiscence?:No    Fever/Chills? :No    Bleeding?:No    Anticoagulation pt was discharged on post op?: Aspirin and Rivaroxaban (Xarelto)    Statin pt was discharged on post op?: Lipitor (atorvastatin)     Uncontrolled Pain?:No      Reviewed discharge instructions and incision care with patient  NEXT OFFICE VISIT SCHEDULED:  4/26/23 at 1 pm with Dinorah Rizo PA-C at [facility]:  Yes - facility to arrange transportation      Any further questions/concerns? Spoke with Carlos Galvan, nurse at [facility] who stated patient is doing good since discharge  She stated that patient's incision looks good and without any signs of infection - no redness, drainage, or open areas  All questions answered  No concerns expressed at this time

## 2023-04-26 ENCOUNTER — OFFICE VISIT (OUTPATIENT)
Dept: VASCULAR SURGERY | Facility: CLINIC | Age: 38
End: 2023-04-26

## 2023-04-26 VITALS
HEIGHT: 61 IN | BODY MASS INDEX: 54.07 KG/M2 | HEART RATE: 94 BPM | SYSTOLIC BLOOD PRESSURE: 116 MMHG | DIASTOLIC BLOOD PRESSURE: 74 MMHG

## 2023-04-26 DIAGNOSIS — I73.9 PERIPHERAL ARTERIAL DISEASE (HCC): ICD-10-CM

## 2023-04-26 DIAGNOSIS — Z89.612 S/P AKA (ABOVE KNEE AMPUTATION) UNILATERAL, LEFT (HCC): Primary | ICD-10-CM

## 2023-04-26 DIAGNOSIS — E66.01 CLASS 3 SEVERE OBESITY DUE TO EXCESS CALORIES WITHOUT SERIOUS COMORBIDITY WITH BODY MASS INDEX (BMI) OF 40.0 TO 44.9 IN ADULT (HCC): ICD-10-CM

## 2023-04-26 DIAGNOSIS — E11.42 TYPE 2 DIABETES MELLITUS WITH DIABETIC POLYNEUROPATHY, WITH LONG-TERM CURRENT USE OF INSULIN (HCC): ICD-10-CM

## 2023-04-26 DIAGNOSIS — Z79.4 TYPE 2 DIABETES MELLITUS WITH DIABETIC POLYNEUROPATHY, WITH LONG-TERM CURRENT USE OF INSULIN (HCC): ICD-10-CM

## 2023-04-26 DIAGNOSIS — F17.200 NICOTINE DEPENDENCE, UNCOMPLICATED, UNSPECIFIED NICOTINE PRODUCT TYPE: ICD-10-CM

## 2023-04-26 NOTE — PROGRESS NOTES
Assessment/Plan:      Post-op above knee amputation (AKA), LEFT (3/29/23, Anastasiya)  -     Limb   -Failed LLE bypass and non-salvageable limb     -Still in rehab; advancing  -No significant stump pain, but some phantom pain  -Anxiety due to AKA; would like anxiolytic (defer to PCP)            -L AKA with incision is well approximated and healing as expected  -Incision is dry; no erythema, heat or drainage  -Staples were removed in the office today there are 6 sutures which were left in place    Plan: Overall stable 4 weeks postop after left BKA  Incision is still healing    There are a couple areas in the midportion of the incision with mild, superficial separation which we will continue to monitor      -Continue to legs incision with saline and apply Betadine at least once daily  -Continue with incisional monitoring and call office if any changes or concerns  -Order for stump  (but not yet ready to use)  -Eventual prosthetic fitting with [facility]  -Continue with medical therapy on aspirin, Xarelto 20 and atorvastatin 40 mg  -Maintain good diabetes control  -Follow-up office visit in 3 or 4 weeks for reevaluation  -Please let us know when you get discharged from center      Peripheral arterial disease ([facility] [address] )  -     VAS lower limb arterial duplex, limited/unilateral; Future  -EZEKIEL 3/13/23: R 0 86/207/90; diffuse fem-pop and TPT    Plan:  -Continue with medical therapy on aspirin, Xarelto 20 and atorvastatin 40  -Maintain good diabetes control  -Check EZEKIEL in 1 year to monitor the right lower extremity with clinical monitoring      Anticoagulation  -Xarelto 20 mg  -follow up with hematology regarding anticoagulation mangement      Insulin-dependent diabetes mellitus with neuropathy  -A1c 9 8  -Follow up with PCP      Nicotine dependence, uncomplicated, unspecified nicotine product type  -Congratulations on not smoking      Class 3 severe obesity due to excess calories without serious comorbidity with body mass index (BMI) of 40 0 to 44 9 in adult [facility])        CC: Lam Garcia MD    Subjective:      Patient ID: Rylee Harris is a 40 y o  female  Patient is s/p L AKA on 3/29/23 Asia Delvalle) and presents today for post-op visit  HPI    Ms Hiro Mcconnell is a 47yo female smoker with diabetes, PAD/LLE CLTI with occluded left fem-BK pop bypass and nonsalvageable left lower extremity now s/p left AKA by Dr Mason Payton 3/29/2023  She was discharged with aspirin, atorvastatin and full dose anticoagulation with Xarelto 20 mg     4/26/23:  Patient is still in rehab at center in [place]  She is advancing and doing well  She enforces some, mild stump pain  Otherwise, no stump problems with the stump that continues to heal  Stump is cleaned iwht saline every day  She is using ACE wrap  There is mild edema  She also has some phantom pain and working on exercises  Her biggest complaint is anxiety from the loss of her leg which was sudden  She is hoping for anxiolytic which was deferred to PCP, but she can call office if PCP or center is unable to help  She confirms that she is taking aspirin, Xarelto 20 and atorvastatin 40  Operative Findings:  Healthy subcutaneous tissues and viable/contractile muscle at level of amputation  Left AKA performed at mid femur        The following portions of the patient's history were reviewed and updated as appropriate: allergies, current medications, past family history, past medical history, past social history, past surgical history and problem list     Review of Systems   Constitutional: Positive for activity change  HENT: Negative  Eyes: Negative  Respiratory: Positive for shortness of breath  Cardiovascular: Negative  Gastrointestinal: Negative  Endocrine: Negative  Genitourinary: Negative  Musculoskeletal: Positive for gait problem  Skin: Negative  Allergic/Immunologic: Negative  Hematological: Negative  Psychiatric/Behavioral: Negative  "    Objective:      /74 (BP Location: Left arm, Patient Position: Sitting)   Pulse 94   Ht 5' 1\" (1 549 m)   BMI 54 07 kg/m²        Physical Exam    Seated in wheelchair  Left AKA- exam as above  Alert and oriented x3  Pleasant no apparent distress  Regular rate rhythm S1-S2  Lungs overall CTAB    I have reviewed and made appropriate changes to the review of systems input by the medical assistant  Vitals:    23 1300   BP: 116/74   BP Location: Left arm   Patient Position: Sitting   Pulse: 94   Height: 5' 1\" (1 549 m)       Patient Active Problem List   Diagnosis   • Headache   • Essential hypertension   • Insulin-dependent diabetes mellitus with neuropathy   • Paresthesia   • COPD (chronic obstructive pulmonary disease) ([facility])   • Nicotine dependence   • Bipolar disorder   • Leg pain   • Bilateral leg pain   • Diarrhea   • PAD (peripheral artery disease)   • Morbid obesity   • Bursitis of left knee   • S/p left BKA (below knee amputation)    • Peripheral arterial disease ([facility])   • Positive D dimer   • Surgical wound breakdown, sequela   • Class 3 severe obesity due to excess calories without serious comorbidity with body mass index (BMI) of 40 0 to 44 9 in [facility])   • Acute respiratory distress   • Postoperative blood loss anemia   • Leukocytosis   • Pyuria   • Status post fall   • Postoperative pain   • Constipation   • S/P AKA (above knee amputation) unilateral, left ([facility])       Past Surgical History:   Procedure Laterality Date   • AMPUTATION ABOVE KNEE (AKA) Left 3/29/2023    Procedure: AMPUTATION ABOVE KNEE (AKA); Surgeon: Mason Payton MD;  Location: [facility];  Service: Vascular   • BYPASS FEMORAL-POPLITEAL Left 2021    Procedure: Left Common Femoral Below Knee to Popliteal Bypass with Insitu GSV graft    Left Lower Extremity Angiogram;  Surgeon: Mason Payton MD;  Location: [facility];  Service: Vascular   •  SECTION     • EAR SURGERY     • IR LOWER EXTREMITY ANGIOGRAM  " "08/11/2021   • IR LOWER EXTREMITY ANGIOGRAM  08/25/2021   • HI SLCTV CATHJ 3RD+ ORD SLCTV ABDL PEL/LXTR [facility] Left 3/24/2023    Procedure: Left leg angiogram;  Surgeon: Corine Clark DO;  Location: [facility];  Service: Vascular   • TUBAL LIGATION         Family History   Problem Relation Age of Onset   • Hypertension Mother    • Diabetes Mother    • HIV Mother    • Heart disease Mother    • No Known Problems Father        Social History     Socioeconomic History   • Marital status: /Civil Union     Spouse name: Not on file   • Number of children: Not on file   • Years of education: Not on file   • Highest education level: Not on file   Occupational History   • Not on file   Tobacco Use   • Smoking status: Every Day     Packs/day: 1 00     Years: 20 00     Pack years: 20 00     Types: Cigarettes   • Smokeless tobacco: Former     Quit date: 4/29/2022   Vaping Use   • Vaping Use: Never used   Substance and Sexual Activity   • Alcohol use: Not Currently     Comment: not currently   • Drug use: Not Currently     Types: Cocaine, Marijuana, \"Crack\" cocaine     Comment: 1 years clean from crack cocaine since 2022   • Sexual activity: Yes     Partners: Female, Male     Birth control/protection: None, Condom   Other Topics Concern   • Not on file   Social History Narrative   • Not on file     Social Determinants of Health     Financial Resource Strain: Not on file   Food Insecurity: No Food Insecurity   • Worried About Running Out of Food in the Last Year: Never true   • Ran Out of Food in the Last Year: Never true   Transportation Needs: No Transportation Needs   • Lack of Transportation (Medical): No   • Lack of Transportation (Non-Medical):  No   Physical Activity: Not on file   Stress: Not on file   Social Connections: Not on file   Intimate Partner Violence: Not on file   Housing Stability: Unknown   • Unable to Pay for Housing in the Last Year: No   • Number of Places Lived in the Last Year: Not on file   • " Unstable Housing in the Last Year: No       No Known Allergies      Current Outpatient Medications:   •  acetaminophen (TYLENOL) 325 mg tablet, Take 3 tablets (975 mg total) by mouth every 8 (eight) hours for 7 days, Disp: 63 tablet, Rfl: 0  •  Advair -21 MCG/ACT inhaler, INHALE 2 PUFFS BY MOUTH TWICE DAILY    RINSE MOUTH AFTER USE, Disp: 12 g, Rfl: 0  •  albuterol (PROVENTIL HFA,VENTOLIN HFA) 90 mcg/act inhaler, Inhale 2 puffs every 4 (four) hours as needed for wheezing or shortness of breath, Disp: 8 5 g, Rfl: 5  •  Alcohol Swabs (Pharmacist Choice Alcohol) PADS, USE 3-4 TIMES AS DIRECTED , Disp: 100 each, Rfl: 3  •  aspirin 81 mg chewable tablet, Chew 1 tablet (81 mg total) daily Do not start before April 20, 2023 , Disp: , Rfl: 0  •  atorvastatin (LIPITOR) 40 mg tablet, Take 1 tablet (40 mg total) by mouth daily with dinner, Disp: , Rfl: 0  •  bisacodyl (DULCOLAX) 10 mg suppository, Insert 1 suppository (10 mg total) into the rectum daily as needed for constipation, Disp: 12 suppository, Rfl: 0  •  Blood Glucose Monitoring Suppl (True Metrix Air Glucose Meter) w/Device KIT, use as directed, Disp: 1 kit, Rfl: 0  •  Blood Pressure Monitoring (Blood Pressure Monitor Automat) KATLYN, Use Daily as Directed, Disp: 1 Device, Rfl: 0  •  DULoxetine (CYMBALTA) 20 mg capsule, Take 1 capsule (20 mg total) by mouth daily Do not start before April 20, 2023 , Disp: , Rfl: 0  •  gabapentin (NEURONTIN) 300 mg capsule, Take 1 capsule (300 mg total) by mouth 3 (three) times a day, Disp: , Rfl: 0  •  Global Inject Ease Lancets 30G MISC, USE 3 (THREE) TIMES A DAY, Disp: 100 each, Rfl: 0  •  HYDROmorphone (DILAUDID) 2 mg tablet, Take 1 (2mg) to 2 tabs (4mg) by mouth every 4 hours as needed for moderate to severe pain, Disp: 12 tablet, Rfl: 0  •  hydrOXYzine HCL (ATARAX) 50 mg tablet, Take 1 tablet (50 mg total) by mouth every 8 (eight) hours as needed for itching or anxiety, Disp: 30 tablet, Rfl: 0  •  insulin glargine (LANTUS) 100 units/mL subcutaneous injection, Inject 45 Units under the skin daily at bedtime, Disp: 10 mL, Rfl: 0  •  insulin lispro (HumaLOG) 100 units/mL injection, Inject 2-12 Units under the skin 3 (three) times a day before meals, Disp: , Rfl: 0  •  insulin lispro (HumaLOG) 100 units/mL injection, Inject 2-12 Units under the skin daily at bedtime, Disp: , Rfl: 0  •  insulin lispro (HumaLOG) 100 units/mL injection, Inject 12 Units under the skin daily with dinner, Disp: , Rfl: 0  •  insulin lispro (HumaLOG) 100 units/mL injection, Inject 20 Units under the skin daily with lunch, Disp: , Rfl: 0  •  insulin lispro (HumaLOG) 100 units/mL injection, Inject 20 Units under the skin daily with breakfast Do not start before April 20, 2023 , Disp: , Rfl: 0  •  Insulin Pen Needle (Comfort EZ Pen Needles) 32G X 4 MM MISC, Inject under the skin 4 (four) times a day (before meals and at bedtime), Disp: 120 each, Rfl: 5  •  Lancet Devices (Lancing Device) MISC, USE AS DIRECTED, Disp: 1 each, Rfl: 0  •  lisinopril (ZESTRIL) 20 mg tablet, TAKE 1 TABLET (20 MG TOTAL) BY MOUTH DAILY, Disp: 30 tablet, Rfl: 0  •  melatonin 3 mg, Take 2 tablets (6 mg total) by mouth daily at bedtime, Disp: , Rfl: 0  •  methocarbamol (ROBAXIN) 750 mg tablet, Take 1 tablet (750 mg total) by mouth every 6 (six) hours as needed for muscle spasms, Disp: , Rfl: 0  •  nicotine (NICODERM CQ) 21 mg/24 hr TD 24 hr patch, Place 1 patch on the skin over 24 hours daily Do not start before April 20, 2023 , Disp: 28 patch, Rfl: 0  •  polyethylene glycol (MIRALAX) 17 g packet, Take 17 g by mouth daily, Disp: , Rfl: 0  •  prazosin (MINIPRESS) 1 mg capsule, Take 1 mg by mouth daily at bedtime, Disp: , Rfl:   •  QUEtiapine (SEROquel) 200 mg tablet, Take by mouth daily at bedtime, Disp: , Rfl:   •  rivaroxaban (XARELTO) 20 mg tablet, Take 1 tablet (20 mg total) by mouth daily with breakfast Do not start before April 21, 2023 , Disp: , Rfl: 0  •  senna-docusate sodium (SENOKOT S) 8 6-50 mg per tablet, Take 2 tablets by mouth 2 (two) times a day, Disp: , Rfl: 0  •  simethicone (MYLICON) 80 mg chewable tablet, Chew 1 tablet (80 mg total) every 6 (six) hours as needed for flatulence, Disp: 30 tablet, Rfl: 0  •  topiramate (TOPAMAX) 25 mg tablet, Take 1 tablet (25 mg total) by mouth 2 (two) times a day, Disp: , Rfl: 0  •  white petrolatum-mineral oil (EUCERIN,HYDROCERIN) cream, Apply topically 3 (three) times a day as needed (dry skin), Disp: , Rfl: 0  •  rivaroxaban (XARELTO) 15 mg tablet, Take 1 tablet (15 mg total) by mouth 2 (two) times a day with meals for 3 doses, Disp: 3 tablet, Rfl: 0

## 2023-04-26 NOTE — PATIENT INSTRUCTIONS
Assessment/Plan:      Post-op above knee amputation (AKA), LEFT (3/29/23, Anastasiya)  -     Limb   -Failed LLE bypass and non-salvageable limb     -Still in rehab; advancing  -No significant stump pain, but some phantom pain  -Anxiety due to AKA; would like anxiolytic (defer to PCP)            -L AKA with incision is well approximated and healing as expected  -Incision is dry; no erythema, heat or drainage  -Staples were removed in the office today there are 6 sutures which were left in place      Plan: Overall stable 4 weeks postop after left BKA  Incision is still healing    There are a couple areas in the midportion of the incision with mild, superficial separation which we will continue to monitor      -Continue to legs incision with saline and apply Betadine at least once daily  -Continue with incisional monitoring and call office if any changes or concerns  -Order for stump  (but not yet ready to use)  -Eventual prosthetic fitting with [employer]  -Continue with medical therapy on aspirin, Xarelto 20 and atorvastatin 40 mg  -Maintain good diabetes control  -Follow-up office visit in 3 or 4 weeks for reevaluation  -Please let us know when you get discharged from center      Peripheral arterial disease ([facility] [address] )  -     VAS lower limb arterial duplex, limited/unilateral; Future  -EZEKIEL 3/13/23: R 0 86/207/90; diffuse fem-pop and TPT    Plan:  -Continue with medical therapy on aspirin, Xarelto 20 and atorvastatin 40  -Maintain good diabetes control  -Check EZEKIEL in 1 year to monitor the right lower extremity with clinical monitoring      Anticoagulation  -Xarelto 20 mg  -follow up with hematology regarding anticoagulation mangement      Insulin-dependent diabetes mellitus with neuropathy  -A1c 9 8  -Follow up with PCP      Nicotine dependence, uncomplicated, unspecified nicotine product type  -Congratulations on not smoking      Class 3 severe obesity due to excess calories without serious comorbidity with body mass index (BMI) of 40 0 to 44 9 in adult [facility])

## 2023-05-09 DIAGNOSIS — J44.9 CHRONIC OBSTRUCTIVE PULMONARY DISEASE, UNSPECIFIED COPD TYPE (HCC): ICD-10-CM

## 2023-05-09 DIAGNOSIS — E11.9 DIABETES (HCC): ICD-10-CM

## 2023-05-09 RX ORDER — ALBUTEROL SULFATE 90 UG/1
2 AEROSOL, METERED RESPIRATORY (INHALATION) EVERY 4 HOURS PRN
Qty: 8.5 G | Refills: 5 | Status: SHIPPED | OUTPATIENT
Start: 2023-05-09

## 2023-05-10 ENCOUNTER — VBI (OUTPATIENT)
Dept: ADMINISTRATIVE | Facility: OTHER | Age: 38
End: 2023-05-10

## 2023-05-10 DIAGNOSIS — Z79.4 TYPE 2 DIABETES MELLITUS WITH DIABETIC POLYNEUROPATHY, WITH LONG-TERM CURRENT USE OF INSULIN (HCC): Primary | ICD-10-CM

## 2023-05-10 DIAGNOSIS — E11.42 TYPE 2 DIABETES MELLITUS WITH DIABETIC POLYNEUROPATHY, WITH LONG-TERM CURRENT USE OF INSULIN (HCC): Primary | ICD-10-CM

## 2023-05-10 RX ORDER — ALCOHOL ANTISEPTIC PADS
PADS, MEDICATED (EA) TOPICAL
Qty: 300 EACH | Refills: 0 | Status: SHIPPED | OUTPATIENT
Start: 2023-05-10

## 2023-05-22 ENCOUNTER — OFFICE VISIT (OUTPATIENT)
Dept: VASCULAR SURGERY | Facility: CLINIC | Age: 38
End: 2023-05-22

## 2023-05-22 VITALS
DIASTOLIC BLOOD PRESSURE: 84 MMHG | BODY MASS INDEX: 54.07 KG/M2 | SYSTOLIC BLOOD PRESSURE: 118 MMHG | HEART RATE: 94 BPM | HEIGHT: 61 IN

## 2023-05-22 DIAGNOSIS — Z89.612 S/P AKA (ABOVE KNEE AMPUTATION) UNILATERAL, LEFT (HCC): Primary | ICD-10-CM

## 2023-05-22 DIAGNOSIS — I73.9 PAD (PERIPHERAL ARTERY DISEASE) (HCC): ICD-10-CM

## 2023-05-22 DIAGNOSIS — Z79.4 TYPE 2 DIABETES MELLITUS WITH DIABETIC POLYNEUROPATHY, WITH LONG-TERM CURRENT USE OF INSULIN (HCC): ICD-10-CM

## 2023-05-22 DIAGNOSIS — E11.42 TYPE 2 DIABETES MELLITUS WITH DIABETIC POLYNEUROPATHY, WITH LONG-TERM CURRENT USE OF INSULIN (HCC): ICD-10-CM

## 2023-05-22 DIAGNOSIS — E66.01 CLASS 3 SEVERE OBESITY DUE TO EXCESS CALORIES WITHOUT SERIOUS COMORBIDITY WITH BODY MASS INDEX (BMI) OF 40.0 TO 44.9 IN ADULT (HCC): ICD-10-CM

## 2023-05-22 RX ORDER — DULAGLUTIDE 1.5 MG/.5ML
INJECTION, SOLUTION SUBCUTANEOUS
COMMUNITY
Start: 2023-04-29

## 2023-05-22 NOTE — PROGRESS NOTES
Assessment/Plan:    Post-op above knee amputation (AKA), LEFT (3/29/23, Anastasiya)    -Almost 8 weeks post L AKA and still in subacute rehab  -States that part of the incision opened up but it is closed now   -Occasional stump pain and tenderness    -No drainage      Exam:   -The incision is overall healed and closed  -There is a tiny, superficial separation in the mid-portion of the stump   -No stump drainage   -Skin a bit thin and crepey at the incision which will need to be monitored  -Mild edema      A/P Overall doing well almost 8 weeks post L AKA  The incision is overall healed and closed  There is a tiny, superficial separation 1-2 mm deep in the mid-portion of the stump  No bleeding  The remaining sutures and staples were all removed in the office today     -Wash stump every day with soap and water  Pat dry  -Apply Bedadine to the mid portion of the incision   -Use moisturizer to the leg but avoid any moisturizer at the incision for now    -Ok for stump   -Continue with incisional monitoring and call office if any changes or concerns  -Eventual prosthetic fitting with [facility] or recommended provider from facility  -Continue with medical therapy on aspirin, Xarelto 20 and atorvastatin 40 mg  -Maintain good diabetes control  -Continued smoking cessation is encouraged  -Follow-up office visit in 4 weeks for reevaluation (televist is ok if no issues)  -Please let us know when you get discharged from center    Additional diagnoses:  Class III severe morbid obesity  Diabetes requiring insulin with neuropathy A1c 9 8  Atherosclerosis of lower extremities  Tobacco addiction    Subjective:      Patient ID: Robert Calderon is a 40 y o  female  Patient presents today for 1 month f/u visit to re-evaluate surgical site healing and remove remaining sutures s/p L AKA on 3/29/23 Ashanti BAUM   Ms Reuben Mackay is a 47yo female smoker with diabetes, PAD/LLE CLTI with occluded left fem-BK pop bypass and "nonsalvageable left lower extremity now s/p left AKA by Dr Genaro Flores 3/29/2023  She was discharged with aspirin, atorvastatin and full dose anticoagulation with Xarelto 20 mg     4/26/23:  Patient is still in rehab at center in [place]  She is advancing and doing well  She enforces some, mild stump pain  Otherwise, no stump problems with the stump that continues to heal  Stump is cleaned iwht saline every day  She is using ACE wrap  There is mild edema       She also has some phantom pain and working on exercises  Her biggest complaint is anxiety from the loss of her leg which was sudden  She is hoping for anxiolytic which was deferred to PCP, but she can call office if PCP or center is unable to help      She confirms that she is taking aspirin, Xarelto 20 and atorvastatin 40    5/22/23: Patient reports that she is still in rehab  She continues to slowly progress with improved range of motion  She tells me that the \"insides\" of the incision pushed out into areas but it is now closed  She has had no bleeding or drainage  She has some stump pain and tenderness  No phantom pain  She came in with ABD pads over the incision but no Ace wrap since it has been too difficult to get around on the leg  No fevers or chills  She would likely stay in rehab until she gets stump   Overall, the incision is healed and closed  We will see her back in a month for recheck  The following portions of the patient's history were reviewed and updated as appropriate: allergies, current medications, past family history, past medical history, past social history, past surgical history and problem list     Review of Systems   Constitutional: Negative  HENT: Negative  Eyes: Negative  Respiratory: Positive for shortness of breath  Cardiovascular: Negative  Gastrointestinal: Negative  Endocrine: Negative  Genitourinary: Negative  Musculoskeletal: Positive for gait problem  Skin: Negative    " "  Allergic/Immunologic: Negative  Hematological: Negative  Psychiatric/Behavioral: Negative  Objective:      /84 (BP Location: Left arm, Patient Position: Sitting)   Pulse 94   Ht 5' 1\" (1 549 m)   BMI 54 07 kg/m²          Physical Exam      Seated in wheelchair  Awake alert and oriented x3  No apparent distress  Respirations nonlabored  Regular rate rhythm  Left BKA exam as above    I have reviewed and made appropriate changes to the review of systems input by the medical assistant  Vitals:    05/22/23 0950   BP: 118/84   BP Location: Left arm   Patient Position: Sitting   Pulse: 94   Height: 5' 1\" (1 549 m)       Patient Active Problem List   Diagnosis   • Headache   • Essential hypertension   • Insulin-dependent diabetes mellitus with neuropathy   • Paresthesia   • COPD (chronic obstructive pulmonary disease) ([facility])   • Nicotine dependence   • Bipolar disorder   • Leg pain   • Bilateral leg pain   • Diarrhea   • PAD (peripheral artery disease)   • Morbid obesity   • Bursitis of left knee   • S/p left BKA (below knee amputation)    • Peripheral arterial disease ([facility])   • Positive D dimer   • Surgical wound breakdown, sequela   • Class 3 severe obesity due to excess calories without serious comorbidity with body mass index (BMI) of 40 0 to 44 9 in [facility])   • Acute respiratory distress   • Postoperative blood loss anemia   • Leukocytosis   • Pyuria   • Status post fall   • Postoperative pain   • Constipation   • S/P AKA (above knee amputation) unilateral, left ([facility])       Past Surgical History:   Procedure Laterality Date   • AMPUTATION ABOVE KNEE (AKA) Left 3/29/2023    Procedure: AMPUTATION ABOVE KNEE (AKA); Surgeon: Lazara Mendiola MD;  Location: [facility];  Service: Vascular   • BYPASS FEMORAL-POPLITEAL Left 09/14/2021    Procedure: Left Common Femoral Below Knee to Popliteal Bypass with Insitu GSV graft    Left Lower Extremity Angiogram;  Surgeon: Lazara Mendiola MD;  Location: [facility] " "OR;  Service: Vascular   •  SECTION     • EAR SURGERY     • IR LOWER EXTREMITY ANGIOGRAM  2021   • IR LOWER EXTREMITY ANGIOGRAM  2021   • NM SLCTV CATHJ 3RD+ ORD SLCTV ABDL PEL/LXTR [facility] Left 3/24/2023    Procedure: Left leg angiogram;  Surgeon: Donnis Prader, DO;  Location: [facility];  Service: Vascular   • TUBAL LIGATION         Family History   Problem Relation Age of Onset   • Hypertension Mother    • Diabetes Mother    • HIV Mother    • Heart disease Mother    • No Known Problems Father        Social History     Socioeconomic History   • Marital status: /Civil Union     Spouse name: Not on file   • Number of children: Not on file   • Years of education: Not on file   • Highest education level: Not on file   Occupational History   • Not on file   Tobacco Use   • Smoking status: Every Day     Packs/day: 1 00     Years: 20 00     Pack years: 20      Types: Cigarettes   • Smokeless tobacco: Former     Quit date: 2022   Vaping Use   • Vaping Use: Never used   Substance and Sexual Activity   • Alcohol use: Not Currently     Comment: not currently   • Drug use: Not Currently     Types: Cocaine, Marijuana, \"Crack\" cocaine     Comment: 1 years clean from crack cocaine since    • Sexual activity: Yes     Partners: Female, Male     Birth control/protection: None, Condom   Other Topics Concern   • Not on file   Social History Narrative   • Not on file     Social Determinants of Health     Financial Resource Strain: Not on file   Food Insecurity: No Food Insecurity   • Worried About Running Out of Food in the Last Year: Never true   • Ran Out of Food in the Last Year: Never true   Transportation Needs: No Transportation Needs   • Lack of Transportation (Medical): No   • Lack of Transportation (Non-Medical):  No   Physical Activity: Not on file   Stress: Not on file   Social Connections: Not on file   Intimate Partner Violence: Not on file   Housing Stability: Unknown   • Unable to Pay " for Housing in the Last Year: No   • Number of Places Lived in the Last Year: Not on file   • Unstable Housing in the Last Year: No       No Known Allergies      Current Outpatient Medications:   •  Advair -21 MCG/ACT inhaler, INHALE 2 PUFFS BY MOUTH TWICE DAILY    RINSE MOUTH AFTER USE, Disp: 12 g, Rfl: 0  •  albuterol (PROVENTIL HFA,VENTOLIN HFA) 90 mcg/act inhaler, INHALE 2 PUFFS EVERY 4 (FOUR) HOURS AS NEEDED FOR WHEEZING OR SHORTNESS OF BREATH, Disp: 8 5 g, Rfl: 5  •  Alcohol Swabs (Pharmacist Choice Alcohol) PADS, USE 3-4 TIMES AS DIRECTED , Disp: 100 each, Rfl: 3  •  aspirin 81 mg chewable tablet, Chew 1 tablet (81 mg total) daily Do not start before April 20, 2023 , Disp: , Rfl: 0  •  atorvastatin (LIPITOR) 40 mg tablet, Take 1 tablet (40 mg total) by mouth daily with dinner, Disp: , Rfl: 0  •  bisacodyl (DULCOLAX) 10 mg suppository, Insert 1 suppository (10 mg total) into the rectum daily as needed for constipation, Disp: 12 suppository, Rfl: 0  •  Blood Glucose Monitoring Suppl (True Metrix Air Glucose Meter) w/Device KIT, use as directed, Disp: 1 kit, Rfl: 0  •  Blood Pressure Monitoring (Blood Pressure Monitor Automat) KATLYN, Use Daily as Directed, Disp: 1 Device, Rfl: 0  •  Comfort EZ Pen Needles 33G X 4 MM MISC, USE AS DIRECTED, Disp: 300 each, Rfl: 0  •  DULoxetine (CYMBALTA) 20 mg capsule, Take 1 capsule (20 mg total) by mouth daily Do not start before April 20, 2023 , Disp: , Rfl: 0  •  gabapentin (NEURONTIN) 300 mg capsule, Take 1 capsule (300 mg total) by mouth 3 (three) times a day, Disp: , Rfl: 0  •  Global Inject Ease Lancets 30G MISC, USE 3 (THREE) TIMES A DAY, Disp: 100 each, Rfl: 0  •  HYDROmorphone (DILAUDID) 2 mg tablet, Take 1 (2mg) to 2 tabs (4mg) by mouth every 4 hours as needed for moderate to severe pain, Disp: 12 tablet, Rfl: 0  •  hydrOXYzine HCL (ATARAX) 50 mg tablet, Take 1 tablet (50 mg total) by mouth every 8 (eight) hours as needed for itching or anxiety, Disp: 30 tablet, Rfl: 0  •  insulin glargine (LANTUS) 100 units/mL subcutaneous injection, Inject 45 Units under the skin daily at bedtime, Disp: 10 mL, Rfl: 0  •  Insulin Pen Needle (Comfort EZ Pen Needles) 32G X 4 MM MISC, Inject under the skin 4 (four) times a day (before meals and at bedtime), Disp: 120 each, Rfl: 5  •  Lancet Devices (Lancing Device) MISC, USE AS DIRECTED, Disp: 1 each, Rfl: 0  •  lisinopril (ZESTRIL) 20 mg tablet, TAKE 1 TABLET (20 MG TOTAL) BY MOUTH DAILY, Disp: 30 tablet, Rfl: 0  •  melatonin 3 mg, Take 2 tablets (6 mg total) by mouth daily at bedtime, Disp: , Rfl: 0  •  metFORMIN (GLUCOPHAGE) 1000 MG tablet, TAKE ONE (1) TABLET BY MOUTH TWICE DAILY WITH FOOD, Disp: 60 tablet, Rfl: 0  •  methocarbamol (ROBAXIN) 750 mg tablet, Take 1 tablet (750 mg total) by mouth every 6 (six) hours as needed for muscle spasms, Disp: , Rfl: 0  •  nicotine (NICODERM CQ) 21 mg/24 hr TD 24 hr patch, Place 1 patch on the skin over 24 hours daily Do not start before April 20, 2023 , Disp: 28 patch, Rfl: 0  •  polyethylene glycol (MIRALAX) 17 g packet, Take 17 g by mouth daily, Disp: , Rfl: 0  •  prazosin (MINIPRESS) 1 mg capsule, Take 1 mg by mouth daily at bedtime, Disp: , Rfl:   •  QUEtiapine (SEROquel) 200 mg tablet, Take by mouth daily at bedtime, Disp: , Rfl:   •  rivaroxaban (XARELTO) 20 mg tablet, Take 1 tablet (20 mg total) by mouth daily with breakfast Do not start before April 21, 2023 , Disp: , Rfl: 0  •  senna-docusate sodium (SENOKOT S) 8 6-50 mg per tablet, Take 2 tablets by mouth 2 (two) times a day, Disp: , Rfl: 0  •  simethicone (MYLICON) 80 mg chewable tablet, Chew 1 tablet (80 mg total) every 6 (six) hours as needed for flatulence, Disp: 30 tablet, Rfl: 0  •  topiramate (TOPAMAX) 25 mg tablet, Take 1 tablet (25 mg total) by mouth 2 (two) times a day, Disp: , Rfl: 0  •  Trulicity 1 5 ND/3 6RR injection, As directed, Disp: , Rfl:   •  white petrolatum-mineral oil (EUCERIN,HYDROCERIN) cream, Apply topically 3 (three) times a day as needed (dry skin), Disp: , Rfl: 0  •  insulin lispro (HumaLOG) 100 units/mL injection, Inject 2-12 Units under the skin 3 (three) times a day before meals, Disp: , Rfl: 0  •  insulin lispro (HumaLOG) 100 units/mL injection, Inject 2-12 Units under the skin daily at bedtime, Disp: , Rfl: 0  •  insulin lispro (HumaLOG) 100 units/mL injection, Inject 12 Units under the skin daily with dinner, Disp: , Rfl: 0  •  insulin lispro (HumaLOG) 100 units/mL injection, Inject 20 Units under the skin daily with lunch, Disp: , Rfl: 0  •  insulin lispro (HumaLOG) 100 units/mL injection, Inject 20 Units under the skin daily with breakfast Do not start before April 20, 2023 , Disp: , Rfl: 0  •  rivaroxaban (XARELTO) 15 mg tablet, Take 1 tablet (15 mg total) by mouth 2 (two) times a day with meals for 3 doses (Patient not taking: Reported on 5/22/2023), Disp: 3 tablet, Rfl: 0

## 2023-05-22 NOTE — PATIENT INSTRUCTIONS
Post-op above knee amputation (AKA), LEFT (3/29/23, Anastasiya)  S/P LEFT AKA         Doing well almost 8 weeks post L AKA  The incision is overall healed and closed  There is a tiny, superficial separation in the mid-portion of the stump     -Wash stump every day with soap and water  Pat dry     -You can apply Bedadine to the mid portion of the incision   -She can use moisturizer to the leg but avoid any moisturize at the incision for now    -Ok for stump   -Continue with incisional monitoring and call office if any changes or concerns  -Eventual prosthetic fitting with [facility] or recommended provider from facility  -Continue with medical therapy on aspirin, Xarelto 20 and atorvastatin 40 mg  -Maintain good diabetes control  -Follow-up office visit in 4 weeks for reevaluation (televist is ok)  -Please let us know when you get discharged from center

## 2023-06-13 DIAGNOSIS — I10 HYPERTENSION: ICD-10-CM

## 2023-06-14 RX ORDER — LISINOPRIL 20 MG/1
20 TABLET ORAL DAILY
Qty: 90 TABLET | Refills: 3 | Status: SHIPPED | OUTPATIENT
Start: 2023-06-14

## 2023-06-27 ENCOUNTER — OFFICE VISIT (OUTPATIENT)
Dept: VASCULAR SURGERY | Facility: CLINIC | Age: 38
End: 2023-06-27

## 2023-06-27 VITALS
WEIGHT: 286 LBS | HEIGHT: 61 IN | SYSTOLIC BLOOD PRESSURE: 136 MMHG | DIASTOLIC BLOOD PRESSURE: 79 MMHG | BODY MASS INDEX: 54 KG/M2

## 2023-06-27 DIAGNOSIS — Z89.612 S/P AKA (ABOVE KNEE AMPUTATION) UNILATERAL, LEFT (HCC): Primary | ICD-10-CM

## 2023-06-27 PROCEDURE — 99024 POSTOP FOLLOW-UP VISIT: CPT | Performed by: NURSE PRACTITIONER

## 2023-06-27 NOTE — PROGRESS NOTES
Virtual Regular Visit    Verification of patient location:[facility] facility    [facility] in the following state in which I hold an active license [place]      Assessment/Plan:    Problem List Items Addressed This Visit        Other    S/P AKA (above knee amputation) unilateral, left ([address] ) - Primary     45yo female smoker with obesity, DM, PAD/LLE CLTI with occluded left fem-BK pop bypass and nonsalvageable left lower extremity now s/p left AKA by Dr Leola Fortune 3/29/2023 presents via virtual visit for evaluation and prosthetic use clearance  - Patient currently residing at [address] [place] [address]  - L AKA incision is healed  - Stump  is complete and pt reports she has been fitted for prosthesis  ([employer])  - edema controlled  - Patient reports great achievements with therapy ie: able to complete all transfers, pick self up off floor, toileting etc    - Denies pain  - Reports very intermittent phantom pains  Plan  - Cleared for L AKA prosthetic use and weight bearing  - continue xarelto and statin  - Return to vascular PRN             Reason for visit is   Chief Complaint   Patient presents with   • Post-op        Encounter provider EMILE Salguero    Provider located at [address] [place] [address]  [address] [facility]  [address] [facility] [id number]      Recent Visits  No visits were found meeting these conditions  Showing recent visits within past 7 days and meeting all other requirements  Today's Visits  Date Type Provider Dept   06/27/23 Office Visit Emmie Ellington, [address] [facility] today's visits and meeting all other requirements  Future Appointments  No visits were found meeting these conditions  Showing future appointments within next 150 days and meeting all other requirements       The patient was identified by name and date of birth   Yessi Samson was informed that this is a WORKERS' COMPENSATION FOLLOW-UP NOTE    EMPLOYER: [employer]    DATE OF INJURY: 10/30/2019    CHIEF COMPLAINT:    Chief Complaint   Patient presents with   • Worker's Compensation     WRF 10/30/19 LEFT THUMB [employer]       HISTORY OF INJURY:   Ignacio Rowe is a 21 year old male, who returns for follow up of a work injury to his  Left thumb  that occurred at work on 10/30/2019.      Compared to the worst this pain has been since the time of initial injury, patient reports currently feeling 50% better.  One week out from laceration of left thumb.  Patient reports doing well.  He reports there is just 1 small section of the laceration that is still healing but otherwise he has had no problems.  Reports no evidence or symptoms of infection.    Current medications were reviewed.  Medications relevant to this injury as actually taken at this time by patient, (including dose, frequency) are: Ibuprofen as needed.    Currently employee states he is working regular job.    Is your employer following the restrictions you were given?  Yes    Therapy:  Patient is not attending.    The specific location of pain or other symptoms: Left Thumb    REVIEW OF SYSTEMS: Relevant findings to this injury are included in the Mechanism of Injury.    PHYSICAL EXAMINATION:  VITALS:   Visit Vitals  /66   Pulse 60   Temp 98.7 °F (37.1 °C) (Oral)     GENERAL: The patient is well developed, well nourished, well groomed and not in any acute distress.   PSYCHIATRIC:  Speech and behavior appropriate. Mood and affect are normal.  NEUROLOGICAL:  Alert and oriented x3, Speech is clear and fluent.   MUSCULOSKELETAL:  Left thumb is neurovascularly intact.  Tendon function is completely intact.  Strength is normal.  He does have full range of motion but he is guarding the IP joint so as not to stress the healing laceration.  There is 1 area of the laceration, at the proximal portion of approximately 0.5 cm area that has not fully healed in but the rest  telemedicine visit and that the visit is being conducted through the [address] [place] [address] Road Now platform  She agrees to proceed     My office door was closed  No one else was in the room  She acknowledged consent and understanding of privacy and security of the video platform  The patient has agreed to participate and understands they can discontinue the visit at any time  Patient is aware this is a billable service  HPI     Past Medical History:   Diagnosis Date   • Asthma    • Atherosclerosis    • Bipolar 1 disorder ([place] [address] )    • COPD (chronic obstructive pulmonary disease) ([facility])    • Depression    • Diabetes mellitus ([place] [address] )    • Hypertension    • Psychiatric disorder     cutting history   • PTSD (post-traumatic stress disorder)    • Schizoaffective disorder ([place] [address] )    • Tendonitis        Past Surgical History:   Procedure Laterality Date   • AMPUTATION ABOVE KNEE (AKA) Left 3/29/2023    Procedure: AMPUTATION ABOVE KNEE (AKA); Surgeon: Dez Crum MD;  Location: [facility];  Service: Vascular   • BYPASS FEMORAL-POPLITEAL Left 2021    Procedure: Left Common Femoral Below Knee to Popliteal Bypass with Insitu GSV graft  Left Lower Extremity Angiogram;  Surgeon: Dez Crum MD;  Location: [facility];  Service: Vascular   •  SECTION     • EAR SURGERY     • IR LOWER EXTREMITY ANGIOGRAM  2021   • IR LOWER EXTREMITY ANGIOGRAM  2021   • AL SLCTV CATHJ 3RD+ ORD SLCTV ABDL PEL/LXTR [facility] Left 3/24/2023    Procedure: Left leg angiogram;  Surgeon: Jaylan Moore DO;  Location: [facility];  Service: Vascular   • TUBAL LIGATION         Current Outpatient Medications   Medication Sig Dispense Refill   • Advair -21 MCG/ACT inhaler INHALE 2 PUFFS BY MOUTH TWICE DAILY    RINSE MOUTH AFTER USE 12 g 0   • albuterol (PROVENTIL HFA,VENTOLIN HFA) 90 mcg/act inhaler INHALE 2 PUFFS EVERY 4 (FOUR) HOURS AS NEEDED FOR WHEEZING OR SHORTNESS OF BREATH 8 5 g 5   • Alcohol Swabs (Pharmacist Choice Alcohol) PADS USE 3-4 TIMES the laceration is completely healed.  There is no evidence of infection.  It does appear a bit moist and he does report keeping it covered most of the time.    ASSESSMENT:  1. Laceration of left thumb without foreign body without damage to nail, subsequent encounter        Work Relatedness:  Based on patient's history and my evaluation, this injury/illness is work related by causation or by precipitation or aggravation.      PLAN:  Laceration is healing well.  I provided him with a another roll of Coban and some gauze so he can cover this up at work but outside of work I have encouraged him to left this be open to air.  He will continue cleansing it with soap and water as he has been.  We discussed possibly following up in a couple weeks or alternatively, calling me should he have any concerns.  He will let me know if he has any concerns with this or any signs or symptoms of infection developed.  I will go ahead and release him from care with the only recommendation to keep this covered and protected at work until it is fully healed.    Treatment/Medication changes:  Continue present management.    During a 15 minute visit, more than half of the visit was spent counseling the patient.         AS DIRECTED  100 each 3   • atorvastatin (LIPITOR) 40 mg tablet Take 1 tablet (40 mg total) by mouth daily with dinner  0   • Blood Glucose Monitoring Suppl (True Metrix Air Glucose Meter) w/Device KIT use as directed 1 kit 0   • Blood Pressure Monitoring (Blood Pressure Monitor Automat) KATLYN Use Daily as Directed 1 Device 0   • Comfort EZ Pen Needles 33G X 4 MM MISC USE AS DIRECTED 300 each 0   • gabapentin (NEURONTIN) 300 mg capsule Take 1 capsule (300 mg total) by mouth 3 (three) times a day  0   • Global Inject Ease Lancets 30G MISC USE 3 (THREE) TIMES A  each 0   • hydrOXYzine HCL (ATARAX) 50 mg tablet Take 1 tablet (50 mg total) by mouth every 8 (eight) hours as needed for itching or anxiety 30 tablet 0   • insulin glargine (LANTUS) 100 units/mL subcutaneous injection Inject 45 Units under the skin daily at bedtime 10 mL 0   • insulin lispro (HumaLOG) 100 units/mL injection Inject 2-12 Units under the skin 3 (three) times a day before meals  0   • insulin lispro (HumaLOG) 100 units/mL injection Inject 2-12 Units under the skin daily at bedtime  0   • insulin lispro (HumaLOG) 100 units/mL injection Inject 12 Units under the skin daily with dinner  0   • insulin lispro (HumaLOG) 100 units/mL injection Inject 20 Units under the skin daily with lunch  0   • insulin lispro (HumaLOG) 100 units/mL injection Inject 20 Units under the skin daily with breakfast Do not start before April 20, 2023   0   • Insulin Pen Needle (Comfort EZ Pen Needles) 32G X 4 MM MISC Inject under the skin 4 (four) times a day (before meals and at bedtime) 120 each 5   • Lancet Devices (Lancing Device) MISC USE AS DIRECTED 1 each 0   • lisinopril (ZESTRIL) 20 mg tablet Take 1 tablet (20 mg total) by mouth daily 90 tablet 3   • melatonin 3 mg Take 2 tablets (6 mg total) by mouth daily at bedtime  0   • metFORMIN (GLUCOPHAGE) 1000 MG tablet TAKE ONE (1) TABLET BY MOUTH TWICE DAILY WITH FOOD 60 tablet 0   • methocarbamol (ROBAXIN) 750 mg tablet Take 1 tablet (750 mg total) by mouth every 6 (six) hours as needed for muscle spasms  0   • nicotine (NICODERM CQ) 21 mg/24 hr TD 24 hr patch Place 1 patch on the skin over 24 hours daily Do not start before April 20, 2023  28 patch 0   • polyethylene glycol (MIRALAX) 17 g packet Take 17 g by mouth daily  0   • prazosin (MINIPRESS) 1 mg capsule Take 1 mg by mouth daily at bedtime     • QUEtiapine (SEROquel) 200 mg tablet Take by mouth daily at bedtime     • rivaroxaban (XARELTO) 20 mg tablet Take 1 tablet (20 mg total) by mouth daily with breakfast Do not start before April 21, 2023   0   • senna-docusate sodium (SENOKOT S) 8 6-50 mg per tablet Take 2 tablets by mouth 2 (two) times a day  0   • simethicone (MYLICON) 80 mg chewable tablet Chew 1 tablet (80 mg total) every 6 (six) hours as needed for flatulence 30 tablet 0   • topiramate (TOPAMAX) 25 mg tablet Take 1 tablet (25 mg total) by mouth 2 (two) times a day  0   • Trulicity 1 5 DF/6 1RI injection As directed     • aspirin 81 mg chewable tablet Chew 1 tablet (81 mg total) daily Do not start before April 20, 2023   (Patient not taking: Reported on 6/27/2023)  0   • bisacodyl (DULCOLAX) 10 mg suppository Insert 1 suppository (10 mg total) into the rectum daily as needed for constipation (Patient not taking: Reported on 6/27/2023) 12 suppository 0   • DULoxetine (CYMBALTA) 20 mg capsule Take 1 capsule (20 mg total) by mouth daily Do not start before April 20, 2023   0   • HYDROmorphone (DILAUDID) 2 mg tablet Take 1 (2mg) to 2 tabs (4mg) by mouth every 4 hours as needed for moderate to severe pain (Patient not taking: Reported on 6/27/2023) 12 tablet 0   • rivaroxaban (XARELTO) 15 mg tablet Take 1 tablet (15 mg total) by mouth 2 (two) times a day with meals for 3 doses (Patient not taking: Reported on 5/22/2023) 3 tablet 0   • white petrolatum-mineral oil (EUCERIN,HYDROCERIN) cream Apply topically 3 (three) times a day as needed (dry skin) (Patient not "taking: Reported on 6/27/2023)  0     No current facility-administered medications for this visit          No Known Allergies    Review of Systems    Video Exam    Vitals:    06/27/23 1105   BP: 136/79   Weight: 130 kg (286 lb)   Height: 5' 1\" (1 549 m)       Physical Exam     Visit Time  Total Visit Duration: 12min virtual, plus charting      "

## 2023-06-27 NOTE — ASSESSMENT & PLAN NOTE
47yo female smoker with obesity, DM, PAD/LLE CLTI with occluded left fem-BK pop bypass and nonsalvageable left lower extremity now s/p left AKA by Dr Liset Willis 3/29/2023 presents via virtual visit for evaluation and prosthetic use clearance  - Patient currently residing at [address] [place] [address]  - L AKA incision is healed  - Stump  is complete and pt reports she has been fitted for prosthesis  ([employer] [place])  - edema controlled  - Patient reports great achievements with therapy ie: able to complete all transfers, pick self up off floor, toileting etc    - Denies pain  - Reports very intermittent phantom pains      Plan  - Cleared for L AKA prosthetic use and weight bearing  - continue xarelto and statin  - Return to vascular PRN

## 2023-07-07 LAB

## 2023-07-10 ENCOUNTER — TELEPHONE (OUTPATIENT)
Dept: FAMILY MEDICINE CLINIC | Facility: CLINIC | Age: 38
End: 2023-07-10

## 2023-07-10 ENCOUNTER — EPISODE CHANGES (OUTPATIENT)
Dept: CASE MANAGEMENT | Facility: OTHER | Age: 38
End: 2023-07-10

## 2023-07-10 ENCOUNTER — PATIENT OUTREACH (OUTPATIENT)
Dept: CASE MANAGEMENT | Facility: OTHER | Age: 38
End: 2023-07-10

## 2023-07-10 ENCOUNTER — TELEMEDICINE (OUTPATIENT)
Dept: FAMILY MEDICINE CLINIC | Facility: CLINIC | Age: 38
End: 2023-07-10

## 2023-07-10 DIAGNOSIS — F17.200 NICOTINE DEPENDENCE, UNCOMPLICATED, UNSPECIFIED NICOTINE PRODUCT TYPE: Primary | ICD-10-CM

## 2023-07-10 DIAGNOSIS — F31.75 BIPOLAR DISORDER, IN PARTIAL REMISSION, MOST RECENT EPISODE DEPRESSED (HCC): ICD-10-CM

## 2023-07-10 DIAGNOSIS — M79.604 BILATERAL LEG PAIN: ICD-10-CM

## 2023-07-10 DIAGNOSIS — M79.605 BILATERAL LEG PAIN: ICD-10-CM

## 2023-07-10 DIAGNOSIS — Z72.0 TOBACCO CONSUMPTION: ICD-10-CM

## 2023-07-10 DIAGNOSIS — Z79.4 TYPE 2 DIABETES MELLITUS WITH DIABETIC POLYNEUROPATHY, WITH LONG-TERM CURRENT USE OF INSULIN (HCC): ICD-10-CM

## 2023-07-10 DIAGNOSIS — G47.00 INSOMNIA: ICD-10-CM

## 2023-07-10 DIAGNOSIS — E11.42 TYPE 2 DIABETES MELLITUS WITH DIABETIC POLYNEUROPATHY, WITH LONG-TERM CURRENT USE OF INSULIN (HCC): ICD-10-CM

## 2023-07-10 RX ORDER — LIDOCAINE 50 MG/G
1 PATCH TOPICAL EVERY 24 HOURS
Qty: 15 PATCH | Refills: 1 | Status: SHIPPED | OUTPATIENT
Start: 2023-07-10

## 2023-07-10 RX ORDER — LANCING DEVICE
EACH MISCELLANEOUS AS NEEDED
Qty: 1 EACH | Refills: 0 | Status: SHIPPED | OUTPATIENT
Start: 2023-07-10

## 2023-07-10 RX ORDER — DULOXETIN HYDROCHLORIDE 20 MG/1
40 CAPSULE, DELAYED RELEASE ORAL DAILY
Qty: 60 CAPSULE | Refills: 2 | Status: SHIPPED | OUTPATIENT
Start: 2023-07-10 | End: 2023-10-08

## 2023-07-10 RX ORDER — LIDOCAINE 50 MG/G
1 PATCH TOPICAL EVERY 24 HOURS
Qty: 15 PATCH | Refills: 1 | Status: SHIPPED | OUTPATIENT
Start: 2023-07-10 | End: 2023-07-10 | Stop reason: SDUPTHER

## 2023-07-10 RX ORDER — NICOTINE 21 MG/24HR
1 PATCH, TRANSDERMAL 24 HOURS TRANSDERMAL DAILY
Qty: 28 PATCH | Refills: 3 | Status: SHIPPED | OUTPATIENT
Start: 2023-07-10

## 2023-07-10 RX ORDER — LANOLIN ALCOHOL/MO/W.PET/CERES
6 CREAM (GRAM) TOPICAL
Qty: 60 TABLET | Refills: 2 | Status: SHIPPED | OUTPATIENT
Start: 2023-07-10 | End: 2023-10-08

## 2023-07-10 NOTE — ASSESSMENT & PLAN NOTE
-Pt states that she is out of both lancets and test strips  -Refills furnished  Lab Results   Component Value Date    HGBA1C 9.8 (A) 12/20/2022

## 2023-07-10 NOTE — TELEPHONE ENCOUNTER
Caller: Patient     Doctor: Darrell Boothe    Reason for call: Patient call stating that her medication are not cover. Patient would like to know if you can order her a different name brand for her Nicotine Patches, and gum and also for her melatonin 3 mg. Lidocaine has to be resubmit because it was sent to print.       Number: [phone]

## 2023-07-10 NOTE — ASSESSMENT & PLAN NOTE
-She has smoking hx but notes that she is out of her nicotine patch, states that she wants to make sure she doesn't start again  -21 mg patch refilled, nicotine gum added

## 2023-07-10 NOTE — PROGRESS NOTES
Outpatient Care Management Note:  In basket referral for the [employer] program received. Chart review completed.      Chart review completed for the following sections:  • Recent Vital Signs  • Allergies/Contradictions  • Medication Review   • History   • SDOH   • Problem List  • Immunizations  • Past hospitalizations and major procedures, including surgery  • Significant past illnesses and treatment history including: History and Physical, Other provider notes, PT, OT, ST, Medications/Infusions/Transfusions, Surgery and lab and radiology results  • Relevant past medications related to the patient's condition

## 2023-07-10 NOTE — PROGRESS NOTES
Family Medicine Follow-Up Office Visit  Sander Calderon 40 y.o. female   MRN: [id number] : 1985  ENCOUNTER: 7/10/2023 7:10 PM    Assessment and Plan   Insulin-dependent diabetes mellitus with neuropathy  -Pt states that she is out of both lancets and test strips  -Refills furnished  Lab Results   Component Value Date    HGBA1C 9.8 (A) 2022       Nicotine dependence  -She has smoking hx but notes that she is out of her nicotine patch, states that she wants to make sure she doesn't start again  -21 mg patch refilled, nicotine gum added    Bilateral leg pain  -pt is s/p L BKA, recent hospital visit for R thigh pain w/ unremarkable workup  -currently notes bilateral leg pain, as well as stating she has some sensation of a phantom limb on L side  -Currently taking gabapentin 300 TID as well as cymbalta 20 daily  -She states that she was offered xanax and percocet after recent ED visit, although they were not prescribed due to her initially stating she did not want something she mignt not take      Plan  -Discussed risks of xanax and percocet, including falls, sedation, dependence  -Will increase cymbalta to 40mg daily, f/u 3-4 weeks      Chief Complaint   No chief complaint on file. History of Present Illness   Sander Calderon is a 40y.o.-year-old female w/ a hx of hx of IDDM, PAD s/p L BKA 3/2023, COPD, HTN, 20 pk-yr smoking history, morbid obesity who presents today for evaluation of pain in bilateral lower extremities. She notes increased pain for several months, including R thigh pain and notes phantom limb sensation on L. She was seen in the ED  states that she was she had been offered percocet as well as xanax, however she states that it was not prescribed because she had initially stated that she did not want it if she might not use it. This visit was conducted via telemed w/ door closed, privacy ensured.      Review of Systems   Review of Systems   Gastrointestinal: Negative for nausea. Musculoskeletal:        Bilateral leg pain   Neurological: Negative for syncope. Nicotine dependence  parasthesias L leg   Psychiatric/Behavioral: The patient is nervous/anxious. Active Problem List     Patient Active Problem List   Diagnosis   • Headache   • Essential hypertension   • Insulin-dependent diabetes mellitus with neuropathy   • Paresthesia   • COPD (chronic obstructive pulmonary disease) (HCC)   • Nicotine dependence   • Bipolar disorder   • Leg pain   • Bilateral leg pain   • Diarrhea   • PAD (peripheral artery disease)   • Morbid obesity   • Bursitis of left knee   • S/p left BKA (below knee amputation)    • Peripheral arterial disease (HCC)   • Positive D dimer   • Surgical wound breakdown, sequela   • Class 3 severe obesity due to excess calories without serious comorbidity with body mass index (BMI) of 40.0 to 44.9 in [facility])   • Acute respiratory distress   • Postoperative blood loss anemia   • Leukocytosis   • Pyuria   • Status post fall   • Postoperative pain   • Constipation   • S/P AKA (above knee amputation) unilateral, left (HCC)       Past Medical History, Past Surgical History, Family History, and Social History were reviewed and updated today as appropriate. Objective   There were no vitals taken for this visit. Physical Exam  Constitutional:       General: She is not in acute distress. Appearance: She is obese. Comments: Physical limited due to telemedicine   HENT:      Head: Normocephalic. Pulmonary:      Effort: Pulmonary effort is normal.   Musculoskeletal:      Comments: L BKA   Neurological:      Mental Status: She is alert and oriented to person, place, and time.    Psychiatric:         Mood and Affect: Mood normal.         Behavior: Behavior normal.       Diabetic Foot Exam    Pertinent Laboratory/Diagnostic Studies:  Lab Results   Component Value Date    GLUCOSE 177 (H) 09/14/2021    BUN 15 04/09/2023    CREATININE 0.59 (L) 04/09/2023 CALCIUM 8.2 (L) 04/09/2023    K 3.8 04/09/2023    CO2 25 04/09/2023     04/09/2023     Lab Results   Component Value Date    ALT 69 03/30/2023    AST 58 (H) 03/30/2023    ALKPHOS 83 03/30/2023       Lab Results   Component Value Date    WBC 8.30 04/19/2023    HGB 8.8 (L) 04/19/2023    HCT 25.7 (L) 04/19/2023    MCV 76 (L) 04/19/2023     (H) 04/19/2023       No results found for: "TSH"    No results found for: "CHOL"  Lab Results   Component Value Date    TRIG 84 03/29/2023     Lab Results   Component Value Date    HDL 54 03/29/2023     Lab Results   Component Value Date    LDLCALC 106 (H) 03/29/2023     Lab Results   Component Value Date    HGBA1C 9.8 (A) 12/20/2022       Results for orders placed or performed during the hospital encounter of 03/24/23   Cayetano Osullivan   Result Value Ref Range    Supplier Name [employer]     Supplier Phone Number [phone]     Order Status Delivery Successful     Delivery Note      Delivery Request Date 04/14/2023     Date Delivered  04/16/2023     Item Description Cayetano Osullivan, Adult    Heavy Duty Wheelchair Package   Result Value Ref Range    Supplier Name [employer]     Supplier Phone Number [phone]     Order Status Delivery Successful     Delivery Note      Delivery Request Date 04/17/2023     Date Delivered  04/17/2023     Item Description Heavy Duty Wheelchair, 24 x 18 in     Item Description Width Extender, 24 in     Item Description Standard Seat Depth, 18 in     Item Description N/A     Item Description Wheelchair Anti Tippers     Item Description Wheelchair Back Cushion, 24 in     Item Description Wheelchair Brake Extenders     Item Description Wheelchair Heel Cruz / Loops     Item Description Swing-Away Foot Rests     Item Description       Heavy Duty Wheelchair Seat Cushion, 24 in, General Use    Item Description Amputee Rest Above Left Knee    Urine culture    Specimen: Urine, Clean Catch Result Value Ref Range    Urine Culture >100,000 cfu/ml Proteus mirabilis (A)     Urine Culture 80,000-89,000 cfu/ml        Susceptibility    Proteus mirabilis - LIN     ZID Performed Yes       Ampicillin ($$) <=8.00 Susceptible ug/ml     Aztreonam ($$$)  <=4 Susceptible ug/ml     Cefazolin ($) <=2.00 Susceptible ug/ml     Ciprofloxacin ($)  <=0.25 Susceptible ug/ml     Gentamicin ($$) <=2 Susceptible ug/ml     Levofloxacin ($) <=0.50 Susceptible ug/ml     Nitrofurantoin >64 Resistant ug/ml     Tetracycline >8 Resistant ug/ml     Tobramycin ($) <=2 Susceptible ug/ml     Trimethoprim + Sulfamethoxazole ($$$) <=0.5/9.5 Susceptible ug/ml   COVID only    Specimen: Nasopharyngeal Swab; Nares   Result Value Ref Range    SARS-CoV-2 Negative Negative   Blood gas, arterial   Result Value Ref Range    pH, Arterial 7.304 (L) 7.350 - 7.450    pCO2, Arterial 44.7 (H) 36.0 - 44.0 mm Hg    pO2, Arterial 113.7 75.0 - 129.0 mm Hg    HCO3, Arterial 21.7 (L) 22.0 - 28.0 mmol/L    Base Excess, Arterial -4.6 mmol/L    O2 Content, Arterial 17.8 16.0 - 23.0 mL/dL    O2 HGB,Arterial  95.2 94.0 - 97.0 %    SOURCE Radial, Left     AXEL TEST Yes     Vent Type- AC AC     AC Rate 14     PC 20     Inspired Air (FIO2) 60     PEEP 8    CBC and differential   Result Value Ref Range    WBC 17.59 (H) 4.31 - 10.16 Thousand/uL    RBC 4.66 3.81 - 5.12 Million/uL    Hemoglobin 12.3 11.5 - 15.4 g/dL    Hematocrit 34.6 (L) 34.8 - 46.1 %    MCV 74 (L) 82 - 98 fL    MCH 26.4 (L) 26.8 - 34.3 pg    MCHC 35.5 31.4 - 37.4 g/dL    RDW 15.8 (H) 11.6 - 15.1 %    MPV 10.5 8.9 - 12.7 fL    Platelets 714 806 - 410 Thousands/uL    nRBC 0 /100 WBCs    Neutrophils Relative 70 43 - 75 %    Immat GRANS % 1 0 - 2 %    Lymphocytes Relative 21 14 - 44 %    Monocytes Relative 7 4 - 12 %    Eosinophils Relative 1 0 - 6 %    Basophils Relative 0 0 - 1 %    Neutrophils Absolute 12.34 (H) 1.85 - 7.62 Thousands/µL    Immature Grans Absolute 0.21 (H) 0.00 - 0.20 Thousand/uL Lymphocytes Absolute 3.60 0.60 - 4.47 Thousands/µL    Monocytes Absolute 1.14 0.17 - 1.22 Thousand/µL    Eosinophils Absolute 0.23 0.00 - 0.61 Thousand/µL    Basophils Absolute 0.07 0.00 - 0.10 Thousands/µL   Comprehensive metabolic panel   Result Value Ref Range    Sodium 131 (L) 135 - 147 mmol/L    Potassium 4.0 3.5 - 5.3 mmol/L    Chloride 106 96 - 108 mmol/L    CO2 20 (L) 21 - 32 mmol/L    ANION GAP 5 4 - 13 mmol/L    BUN 11 5 - 25 mg/dL    Creatinine 0.64 0.60 - 1.30 mg/dL    Glucose 372 (H) 65 - 140 mg/dL    Calcium 7.9 (L) 8.3 - 10.1 mg/dL    Corrected Calcium 8.9 8.3 - 10.1 mg/dL    AST 23 5 - 45 U/L    ALT 36 12 - 78 U/L    Alkaline Phosphatase 114 46 - 116 U/L    Total Protein 6.4 6.4 - 8.4 g/dL    Albumin 2.7 (L) 3.5 - 5.0 g/dL    Total Bilirubin 0.58 0.20 - 1.00 mg/dL    eGFR 115 ml/min/1.73sq m   Magnesium   Result Value Ref Range    Magnesium 1.9 1.6 - 2.6 mg/dL   Phosphorus   Result Value Ref Range    Phosphorus 3.2 2.7 - 4.5 mg/dL   Calcium, ionized   Result Value Ref Range    Calcium, Ionized 1.07 (L) 1.12 - 1.32 mmol/L   NT-BNP [facility] only   Result Value Ref Range    NT-proBNP 12 <125 pg/mL   HS Troponin 0hr (reflex protocol)   Result Value Ref Range    hs TnI 0hr 5 "Refer to ACS Flowchart"- see link ng/L   Equal mix, APTT   Result Value Ref Range    PTT Mixing Study Room Temp      PTT Mixing Study Incubated      PTT 21 (L) 23 - 37 seconds   Equal mix, PT / INR   Result Value Ref Range    PT Mixing Study Room Temp      PT Mixing Study Incubated      Protime 13.2 11.6 - 14.5 seconds   Thrombin Time Mixing Study   Result Value Ref Range    Thrombin Time Mixing Room Temp      Thrombin Time Mixing Incubated      Thrombin Time 16.9 14.7 - 18.4 seconds   Platelet count   Result Value Ref Range    Platelets 200 520 - 472 Thousands/uL    MPV 10.7 8.9 - 12.7 fL   Protime-INR   Result Value Ref Range    Protime 13.3 11.6 - 14.5 seconds    INR 0.99 0.84 - 1.19   APTT   Result Value Ref Range    PTT 21 (L) 23 - 37 seconds   Thrombin time   Result Value Ref Range    Thrombin Time 16.8 14.7 - 18.4 seconds   Fibrinogen   Result Value Ref Range    Fibrinogen 524 (H) 227 - 495 mg/dL   Antithrombin III Activity   Result Value Ref Range    AntiThrombIN III Activity 115 92 - 136 % of Normal   Cardiolipin antibody   Result Value Ref Range    ANTICARDIOLIPIN IGG ANTIBODY 0.7 See comment    ANTICARDIOLIPIN IGA ANTIBODY 4.1 See comment    ANTICARDIOLIPIN IGM ANTIBODY 1.4 See comment   Factor 5 leiden   Result Value Ref Range    Factor V Leiden Comment    Lupus anticoagulant   Result Value Ref Range    PTT Lupus Anticoagulant 23.3 0.0 - 43.5 sec    Dilute Viper Venom Time 38.4 0.0 - 47.0 sec    DILUTE PROTHROMBIN TIME(DPT) 35.8 0.0 - 47.6 sec    THROMBIN TIME (DRVW) 15.4 0.0 - 23.0 sec    DPT CONFIRM RATIO 1.11 0.00 - 1.34 Ratio    LUPUS REFLEX INTERPRETATION Comment:    Protein C activity   Result Value Ref Range    Protein C Activity 63.0 60 - 150 % of Normal   Protein S activity   Result Value Ref Range    PROTEIN S ACTIVITY 58 (L) 68 - 108 %   Protein S Antigen, Total & Free   Result Value Ref Range    Protein S Ag, Total 117 60 - 150 %    Protein S Ag, Free 102 61 - 136 %   Prothrombin gene mutation   Result Value Ref Range    Prothrombin Mutation Comment    Beta-2 glycoprotein antibodies   Result Value Ref Range    BETA 2 GLYCO 1 IGG <0.8 See comment    BETA 2 GLYCO 1 IGA 2.2 See comment    BETA 2 GLYCO 1 IGM <2.4 See comment   CBC and differential   Result Value Ref Range    WBC 13.76 (H) 4.31 - 10.16 Thousand/uL    RBC 4.74 3.81 - 5.12 Million/uL    Hemoglobin 12.4 11.5 - 15.4 g/dL    Hematocrit 35.0 34.8 - 46.1 %    MCV 74 (L) 82 - 98 fL    MCH 26.2 (L) 26.8 - 34.3 pg    MCHC 35.4 31.4 - 37.4 g/dL    RDW 15.7 (H) 11.6 - 15.1 %    MPV 10.4 8.9 - 12.7 fL    Platelets 265 968 - 517 Thousands/uL   Comprehensive metabolic panel   Result Value Ref Range    Sodium 136 135 - 147 mmol/L    Potassium 3.9 3.5 - 5.3 mmol/L Chloride 109 (H) 96 - 108 mmol/L    CO2 21 21 - 32 mmol/L    ANION GAP 6 4 - 13 mmol/L    BUN 10 5 - 25 mg/dL    Creatinine 0.58 (L) 0.60 - 1.30 mg/dL    Glucose 211 (H) 65 - 140 mg/dL    Calcium 8.2 (L) 8.3 - 10.1 mg/dL    Corrected Calcium 9.2 8.3 - 10.1 mg/dL    AST 19 5 - 45 U/L    ALT 35 12 - 78 U/L    Alkaline Phosphatase 109 46 - 116 U/L    Total Protein 6.5 6.4 - 8.4 g/dL    Albumin 2.7 (L) 3.5 - 5.0 g/dL    Total Bilirubin 0.23 0.20 - 1.00 mg/dL    eGFR 118 ml/min/1.73sq m   Phosphorus   Result Value Ref Range    Phosphorus 3.2 2.7 - 4.5 mg/dL   Magnesium   Result Value Ref Range    Magnesium 2.5 1.6 - 2.6 mg/dL   Comprehensive metabolic panel   Result Value Ref Range    Sodium 138 135 - 147 mmol/L    Potassium 4.3 3.5 - 5.3 mmol/L    Chloride 111 (H) 96 - 108 mmol/L    CO2 24 21 - 32 mmol/L    ANION GAP 3 (L) 4 - 13 mmol/L    BUN 10 5 - 25 mg/dL    Creatinine 0.54 (L) 0.60 - 1.30 mg/dL    Glucose 207 (H) 65 - 140 mg/dL    Calcium 7.8 (L) 8.3 - 10.1 mg/dL    Corrected Calcium 9.0 8.3 - 10.1 mg/dL    AST 49 (H) 5 - 45 U/L    ALT 33 12 - 78 U/L    Alkaline Phosphatase 95 46 - 116 U/L    Total Protein 6.2 (L) 6.4 - 8.4 g/dL    Albumin 2.5 (L) 3.5 - 5.0 g/dL    Total Bilirubin 0.22 0.20 - 1.00 mg/dL    eGFR 121 ml/min/1.73sq m   CBC and differential   Result Value Ref Range    WBC 17.28 (H) 4.31 - 10.16 Thousand/uL    RBC 4.45 3.81 - 5.12 Million/uL    Hemoglobin 11.5 11.5 - 15.4 g/dL    Hematocrit 33.3 (L) 34.8 - 46.1 %    MCV 75 (L) 82 - 98 fL    MCH 25.8 (L) 26.8 - 34.3 pg    MCHC 34.5 31.4 - 37.4 g/dL    RDW 16.2 (H) 11.6 - 15.1 %    MPV 11.1 8.9 - 12.7 fL    Platelets 580 593 - 291 Thousands/uL    nRBC 0 /100 WBCs    Neutrophils Relative 88 (H) 43 - 75 %    Immat GRANS % 1 0 - 2 %    Lymphocytes Relative 5 (L) 14 - 44 %    Monocytes Relative 6 4 - 12 %    Eosinophils Relative 0 0 - 6 %    Basophils Relative 0 0 - 1 %    Neutrophils Absolute 15.20 (H) 1.85 - 7.62 Thousands/µL    Immature Grans Absolute 0.21 (H) 0.00 - 0.20 Thousand/uL    Lymphocytes Absolute 0.88 0.60 - 4.47 Thousands/µL    Monocytes Absolute 0.97 0.17 - 1.22 Thousand/µL    Eosinophils Absolute 0.00 0.00 - 0.61 Thousand/µL    Basophils Absolute 0.02 0.00 - 0.10 Thousands/µL   Phosphorus   Result Value Ref Range    Phosphorus 4.0 2.7 - 4.5 mg/dL   Magnesium   Result Value Ref Range    Magnesium 2.4 1.6 - 2.6 mg/dL   Comprehensive metabolic panel   Result Value Ref Range    Sodium 136 135 - 147 mmol/L    Potassium 3.9 3.5 - 5.3 mmol/L    Chloride 108 96 - 108 mmol/L    CO2 25 21 - 32 mmol/L    ANION GAP 3 (L) 4 - 13 mmol/L    BUN 16 5 - 25 mg/dL    Creatinine 0.58 (L) 0.60 - 1.30 mg/dL    Glucose 237 (H) 65 - 140 mg/dL    Calcium 7.8 (L) 8.3 - 10.1 mg/dL    Corrected Calcium 9.0 8.3 - 10.1 mg/dL     (H) 5 - 45 U/L    ALT 58 12 - 78 U/L    Alkaline Phosphatase 98 46 - 116 U/L    Total Protein 6.4 6.4 - 8.4 g/dL    Albumin 2.5 (L) 3.5 - 5.0 g/dL    Total Bilirubin 0.24 0.20 - 1.00 mg/dL    eGFR 118 ml/min/1.73sq m   CBC and differential   Result Value Ref Range    WBC 17.93 (H) 4.31 - 10.16 Thousand/uL    RBC 4.52 3.81 - 5.12 Million/uL    Hemoglobin 11.9 11.5 - 15.4 g/dL    Hematocrit 33.5 (L) 34.8 - 46.1 %    MCV 74 (L) 82 - 98 fL    MCH 26.3 (L) 26.8 - 34.3 pg    MCHC 35.5 31.4 - 37.4 g/dL    RDW 15.9 (H) 11.6 - 15.1 %    MPV 10.3 8.9 - 12.7 fL    Platelets 124 901 - 388 Thousands/uL    nRBC 0 /100 WBCs    Neutrophils Relative 82 (H) 43 - 75 %    Immat GRANS % 2 0 - 2 %    Lymphocytes Relative 9 (L) 14 - 44 %    Monocytes Relative 7 4 - 12 %    Eosinophils Relative 0 0 - 6 %    Basophils Relative 0 0 - 1 %    Neutrophils Absolute 14.72 (H) 1.85 - 7.62 Thousands/µL    Immature Grans Absolute 0.39 (H) 0.00 - 0.20 Thousand/uL    Lymphocytes Absolute 1.57 0.60 - 4.47 Thousands/µL    Monocytes Absolute 1.21 0.17 - 1.22 Thousand/µL    Eosinophils Absolute 0.00 0.00 - 0.61 Thousand/µL    Basophils Absolute 0.04 0.00 - 0.10 Thousands/µL   APTT   Result Value Ref Range    PTT 26 23 - 37 seconds   Protime-INR   Result Value Ref Range    Protime 13.6 11.6 - 14.5 seconds    INR 1.01 0.84 - 1.19   APTT   Result Value Ref Range    PTT >210 () 23 - 37 seconds   Phosphorus   Result Value Ref Range    Phosphorus 4.0 2.7 - 4.5 mg/dL   Magnesium   Result Value Ref Range    Magnesium 2.3 1.6 - 2.6 mg/dL   Comprehensive metabolic panel   Result Value Ref Range    Sodium 140 135 - 147 mmol/L    Potassium 3.3 (L) 3.5 - 5.3 mmol/L    Chloride 107 96 - 108 mmol/L    CO2 30 21 - 32 mmol/L    ANION GAP 3 (L) 4 - 13 mmol/L    BUN 17 5 - 25 mg/dL    Creatinine 0.38 (L) 0.60 - 1.30 mg/dL    Glucose 87 65 - 140 mg/dL    Calcium 7.6 (L) 8.3 - 10.1 mg/dL    Corrected Calcium 9.2 8.3 - 10.1 mg/dL     (H) 5 - 45 U/L    ALT 55 12 - 78 U/L    Alkaline Phosphatase 80 46 - 116 U/L    Total Protein 5.6 (L) 6.4 - 8.4 g/dL    Albumin 2.0 (L) 3.5 - 5.0 g/dL    Total Bilirubin 0.42 0.20 - 1.00 mg/dL    eGFR 136 ml/min/1.73sq m   CBC and differential   Result Value Ref Range    WBC 13.99 (H) 4.31 - 10.16 Thousand/uL    RBC 4.13 3.81 - 5.12 Million/uL    Hemoglobin 10.7 (L) 11.5 - 15.4 g/dL    Hematocrit 30.3 (L) 34.8 - 46.1 %    MCV 73 (L) 82 - 98 fL    MCH 25.9 (L) 26.8 - 34.3 pg    MCHC 35.3 31.4 - 37.4 g/dL    RDW 15.8 (H) 11.6 - 15.1 %    MPV 10.2 8.9 - 12.7 fL    Platelets 420 139 - 421 Thousands/uL    nRBC 0 /100 WBCs    Neutrophils Relative 62 43 - 75 %    Immat GRANS % 1 0 - 2 %    Lymphocytes Relative 30 14 - 44 %    Monocytes Relative 6 4 - 12 %    Eosinophils Relative 1 0 - 6 %    Basophils Relative 0 0 - 1 %    Neutrophils Absolute 8.59 (H) 1.85 - 7.62 Thousands/µL    Immature Grans Absolute 0.20 0.00 - 0.20 Thousand/uL    Lymphocytes Absolute 4.13 0.60 - 4.47 Thousands/µL    Monocytes Absolute 0.86 0.17 - 1.22 Thousand/µL    Eosinophils Absolute 0.17 0.00 - 0.61 Thousand/µL    Basophils Absolute 0.04 0.00 - 0.10 Thousands/µL   APTT   Result Value Ref Range    PTT >210 () 23 - 37 seconds   APTT   Result Value Ref Range    PTT 86 (H) 23 - 37 seconds   APTT   Result Value Ref Range    PTT 87 (H) 23 - 37 seconds   CBC   Result Value Ref Range    WBC 15.80 (H) 4.31 - 10.16 Thousand/uL    RBC 4.22 3.81 - 5.12 Million/uL    Hemoglobin 11.0 (L) 11.5 - 15.4 g/dL    Hematocrit 31.2 (L) 34.8 - 46.1 %    MCV 74 (L) 82 - 98 fL    MCH 26.1 (L) 26.8 - 34.3 pg    MCHC 35.3 31.4 - 37.4 g/dL    RDW 15.6 (H) 11.6 - 15.1 %    Platelets 721 824 - 430 Thousands/uL    MPV 10.0 8.9 - 12.7 fL   Basic metabolic panel   Result Value Ref Range    Sodium 138 135 - 147 mmol/L    Potassium 3.7 3.5 - 5.3 mmol/L    Chloride 105 96 - 108 mmol/L    CO2 29 21 - 32 mmol/L    ANION GAP 4 4 - 13 mmol/L    BUN 18 5 - 25 mg/dL    Creatinine 0.42 (L) 0.60 - 1.30 mg/dL    Glucose 120 65 - 140 mg/dL    Calcium 8.8 8.3 - 10.1 mg/dL    eGFR 132 ml/min/1.73sq m   Lipid panel   Result Value Ref Range    Cholesterol 177 See Comment mg/dL    Triglycerides 84 See Comment mg/dL    HDL, Direct 54 >=50 mg/dL    LDL Calculated 106 (H) 0 - 100 mg/dL    Non-HDL-Chol (CHOL-HDL) 123 mg/dl   APTT   Result Value Ref Range    PTT 69 (H) 23 - 37 seconds   CBC   Result Value Ref Range    WBC 16.04 (H) 4.31 - 10.16 Thousand/uL    RBC 3.73 (L) 3.81 - 5.12 Million/uL    Hemoglobin 9.6 (L) 11.5 - 15.4 g/dL    Hematocrit 27.5 (L) 34.8 - 46.1 %    MCV 74 (L) 82 - 98 fL    MCH 25.7 (L) 26.8 - 34.3 pg    MCHC 34.9 31.4 - 37.4 g/dL    RDW 15.8 (H) 11.6 - 15.1 %    Platelets 404 635 - 796 Thousands/uL    MPV 10.2 8.9 - 12.7 fL   APTT   Result Value Ref Range    PTT 35 23 - 37 seconds   CBC   Result Value Ref Range    WBC 18.34 (H) 4.31 - 10.16 Thousand/uL    RBC 3.59 (L) 3.81 - 5.12 Million/uL    Hemoglobin 9.3 (L) 11.5 - 15.4 g/dL    Hematocrit 26.4 (L) 34.8 - 46.1 %    MCV 74 (L) 82 - 98 fL    MCH 25.9 (L) 26.8 - 34.3 pg    MCHC 35.2 31.4 - 37.4 g/dL    RDW 15.4 (H) 11.6 - 15.1 %    Platelets 076 681 - 683 Thousands/uL    MPV 10.2 8.9 - 12.7 fL Comprehensive metabolic panel   Result Value Ref Range    Sodium 136 135 - 147 mmol/L    Potassium 3.7 3.5 - 5.3 mmol/L    Chloride 103 96 - 108 mmol/L    CO2 28 21 - 32 mmol/L    ANION GAP 5 4 - 13 mmol/L    BUN 16 5 - 25 mg/dL    Creatinine 0.44 (L) 0.60 - 1.30 mg/dL    Glucose 196 (H) 65 - 140 mg/dL    Calcium 8.1 (L) 8.3 - 10.1 mg/dL    Corrected Calcium 9.7 8.3 - 10.1 mg/dL    AST 58 (H) 5 - 45 U/L    ALT 69 12 - 78 U/L    Alkaline Phosphatase 83 46 - 116 U/L    Total Protein 5.7 (L) 6.4 - 8.4 g/dL    Albumin 2.0 (L) 3.5 - 5.0 g/dL    Total Bilirubin 0.32 0.20 - 1.00 mg/dL    eGFR 130 ml/min/1.73sq m   APTT   Result Value Ref Range    PTT 67 (H) 23 - 37 seconds   APTT   Result Value Ref Range    PTT 49 (H) 23 - 37 seconds   APTT   Result Value Ref Range    PTT 53 (H) 23 - 37 seconds   APTT   Result Value Ref Range    PTT 51 (H) 23 - 37 seconds   CBC   Result Value Ref Range    WBC 20.32 (H) 4.31 - 10.16 Thousand/uL    RBC 3.61 (L) 3.81 - 5.12 Million/uL    Hemoglobin 9.3 (L) 11.5 - 15.4 g/dL    Hematocrit 27.6 (L) 34.8 - 46.1 %    MCV 77 (L) 82 - 98 fL    MCH 25.8 (L) 26.8 - 34.3 pg    MCHC 33.7 31.4 - 37.4 g/dL    RDW 15.7 (H) 11.6 - 15.1 %    Platelets 992 890 - 676 Thousands/uL    MPV 10.2 8.9 - 12.7 fL   APTT   Result Value Ref Range    PTT 33 23 - 37 seconds   CBC   Result Value Ref Range    WBC 14.52 (H) 4.31 - 10.16 Thousand/uL    RBC 3.37 (L) 3.81 - 5.12 Million/uL    Hemoglobin 8.7 (L) 11.5 - 15.4 g/dL    Hematocrit 25.4 (L) 34.8 - 46.1 %    MCV 75 (L) 82 - 98 fL    MCH 25.8 (L) 26.8 - 34.3 pg    MCHC 34.3 31.4 - 37.4 g/dL    RDW 15.9 (H) 11.6 - 15.1 %    Platelets 775 (H) 045 - 390 Thousands/uL    MPV 9.5 8.9 - 12.7 fL   Basic metabolic panel   Result Value Ref Range    Sodium 136 135 - 147 mmol/L    Potassium 3.9 3.5 - 5.3 mmol/L    Chloride 107 96 - 108 mmol/L    CO2 26 21 - 32 mmol/L    ANION GAP 3 (L) 4 - 13 mmol/L    BUN 13 5 - 25 mg/dL    Creatinine 0.47 (L) 0.60 - 1.30 mg/dL    Glucose 225 (H) 65 - 140 mg/dL    Calcium 8.4 8.3 - 10.1 mg/dL    eGFR 127 ml/min/1.73sq m   Iron Saturation %   Result Value Ref Range    Iron Saturation 10 (L) 15 - 50 %    TIBC 210 (L) 250 - 450 ug/dL    Iron 21 (L) 50 - 170 ug/dL   Ferritin   Result Value Ref Range    Ferritin 54 8 - 388 ng/mL   Lactic acid, plasma   Result Value Ref Range    LACTIC ACID 2.0 0.5 - 2.0 mmol/L   Procalcitonin   Result Value Ref Range    Procalcitonin 0.06 <=0.25 ng/ml   Urinalysis with microscopic   Result Value Ref Range    Color, UA Light Yellow     Clarity, UA Turbid     Specific Gravity, UA 1.027 1.003 - 1.030    pH, UA 7.0 4.5, 5.0, 5.5, 6.0, 6.5, 7.0, 7.5, 8.0    Leukocytes, UA Large (A) Negative    Nitrite, UA Negative Negative    Protein, UA Trace (A) Negative mg/dl    Glucose, UA >=1000 (1%) (A) Negative mg/dl    Ketones, UA Negative Negative mg/dl    Urobilinogen, UA <2.0 <2.0 mg/dl mg/dl    Bilirubin, UA Negative Negative    Occult Blood, UA Small (A) Negative    RBC, UA 4-10 (A) None Seen, 1-2 /hpf    WBC, UA Innumerable (A) None Seen, 1-2 /hpf    Epithelial Cells Occasional None Seen, Occasional /hpf    Bacteria, UA Occasional None Seen, Occasional /hpf   CBC   Result Value Ref Range    WBC 11.33 (H) 4.31 - 10.16 Thousand/uL    RBC 3.23 (L) 3.81 - 5.12 Million/uL    Hemoglobin 8.4 (L) 11.5 - 15.4 g/dL    Hematocrit 25.4 (L) 34.8 - 46.1 %    MCV 79 (L) 82 - 98 fL    MCH 26.0 (L) 26.8 - 34.3 pg    MCHC 33.1 31.4 - 37.4 g/dL    RDW 16.3 (H) 11.6 - 15.1 %    Platelets 300 124 - 992 Thousands/uL    MPV 9.8 8.9 - 12.7 fL   Basic metabolic panel   Result Value Ref Range    Sodium 135 135 - 147 mmol/L    Potassium 3.8 3.5 - 5.3 mmol/L    Chloride 108 96 - 108 mmol/L    CO2 25 21 - 32 mmol/L    ANION GAP 2 (L) 4 - 13 mmol/L    BUN 15 5 - 25 mg/dL    Creatinine 0.59 (L) 0.60 - 1.30 mg/dL    Glucose 251 (H) 65 - 140 mg/dL    Calcium 8.2 (L) 8.3 - 10.1 mg/dL    eGFR 118 ml/min/1.73sq m   CBC and differential   Result Value Ref Range    WBC 9. 95 4.31 - 10.16 Thousand/uL    RBC 3.51 (L) 3.81 - 5.12 Million/uL    Hemoglobin 8.9 (L) 11.5 - 15.4 g/dL    Hematocrit 26.8 (L) 34.8 - 46.1 %    MCV 76 (L) 82 - 98 fL    MCH 25.4 (L) 26.8 - 34.3 pg    MCHC 33.2 31.4 - 37.4 g/dL    RDW 16.2 (H) 11.6 - 15.1 %    MPV 9.5 8.9 - 12.7 fL    Platelets 806 (H) 133 - 390 Thousands/uL    nRBC 1 /100 WBCs    Neutrophils Relative 65 43 - 75 %    Immat GRANS % 2 0 - 2 %    Lymphocytes Relative 22 14 - 44 %    Monocytes Relative 6 4 - 12 %    Eosinophils Relative 4 0 - 6 %    Basophils Relative 1 0 - 1 %    Neutrophils Absolute 6.48 1.85 - 7.62 Thousands/µL    Immature Grans Absolute 0.17 0.00 - 0.20 Thousand/uL    Lymphocytes Absolute 2.22 0.60 - 4.47 Thousands/µL    Monocytes Absolute 0.61 0.17 - 1.22 Thousand/µL    Eosinophils Absolute 0.42 0.00 - 0.61 Thousand/µL    Basophils Absolute 0.05 0.00 - 0.10 Thousands/µL   CBC and differential   Result Value Ref Range    WBC 8.08 4.31 - 10.16 Thousand/uL    RBC 3.38 (L) 3.81 - 5.12 Million/uL    Hemoglobin 8.5 (L) 11.5 - 15.4 g/dL    Hematocrit 25.4 (L) 34.8 - 46.1 %    MCV 75 (L) 82 - 98 fL    MCH 25.1 (L) 26.8 - 34.3 pg    MCHC 33.5 31.4 - 37.4 g/dL    RDW 16.2 (H) 11.6 - 15.1 %    MPV 9.4 8.9 - 12.7 fL    Platelets 156 371 - 475 Thousands/uL    nRBC 0 /100 WBCs    Neutrophils Relative 63 43 - 75 %    Immat GRANS % 1 0 - 2 %    Lymphocytes Relative 24 14 - 44 %    Monocytes Relative 6 4 - 12 %    Eosinophils Relative 5 0 - 6 %    Basophils Relative 1 0 - 1 %    Neutrophils Absolute 5.08 1.85 - 7.62 Thousands/µL    Immature Grans Absolute 0.11 0.00 - 0.20 Thousand/uL    Lymphocytes Absolute 1.97 0.60 - 4.47 Thousands/µL    Monocytes Absolute 0.52 0.17 - 1.22 Thousand/µL    Eosinophils Absolute 0.36 0.00 - 0.61 Thousand/µL    Basophils Absolute 0.04 0.00 - 0.10 Thousands/µL   CBC and differential   Result Value Ref Range    WBC 7.68 4.31 - 10.16 Thousand/uL    RBC 3.38 (L) 3.81 - 5.12 Million/uL    Hemoglobin 8.9 (L) 11.5 - 15.4 g/dL    Hematocrit 25.6 (L) 34.8 - 46.1 %    MCV 76 (L) 82 - 98 fL    MCH 26.3 (L) 26.8 - 34.3 pg    MCHC 34.8 31.4 - 37.4 g/dL    RDW 16.1 (H) 11.6 - 15.1 %    MPV 9.5 8.9 - 12.7 fL    Platelets 119 (H) 918 - 390 Thousands/uL    nRBC 1 /100 WBCs    Neutrophils Relative 63 43 - 75 %    Immat GRANS % 1 0 - 2 %    Lymphocytes Relative 26 14 - 44 %    Monocytes Relative 5 4 - 12 %    Eosinophils Relative 5 0 - 6 %    Basophils Relative 0 0 - 1 %    Neutrophils Absolute 4.77 1.85 - 7.62 Thousands/µL    Immature Grans Absolute 0.09 0.00 - 0.20 Thousand/uL    Lymphocytes Absolute 2.02 0.60 - 4.47 Thousands/µL    Monocytes Absolute 0.41 0.17 - 1.22 Thousand/µL    Eosinophils Absolute 0.37 0.00 - 0.61 Thousand/µL    Basophils Absolute 0.02 0.00 - 0.10 Thousands/µL   CBC and differential   Result Value Ref Range    WBC 7.87 4.31 - 10.16 Thousand/uL    RBC 3.48 (L) 3.81 - 5.12 Million/uL    Hemoglobin 9.0 (L) 11.5 - 15.4 g/dL    Hematocrit 26.4 (L) 34.8 - 46.1 %    MCV 76 (L) 82 - 98 fL    MCH 25.9 (L) 26.8 - 34.3 pg    MCHC 34.1 31.4 - 37.4 g/dL    RDW 16.2 (H) 11.6 - 15.1 %    MPV 9.6 8.9 - 12.7 fL    Platelets 412 (H) 362 - 390 Thousands/uL    nRBC 0 /100 WBCs    Neutrophils Relative 61 43 - 75 %    Immat GRANS % 1 0 - 2 %    Lymphocytes Relative 27 14 - 44 %    Monocytes Relative 6 4 - 12 %    Eosinophils Relative 5 0 - 6 %    Basophils Relative 0 0 - 1 %    Neutrophils Absolute 4.79 1.85 - 7.62 Thousands/µL    Immature Grans Absolute 0.07 0.00 - 0.20 Thousand/uL    Lymphocytes Absolute 2.09 0.60 - 4.47 Thousands/µL    Monocytes Absolute 0.49 0.17 - 1.22 Thousand/µL    Eosinophils Absolute 0.41 0.00 - 0.61 Thousand/µL    Basophils Absolute 0.02 0.00 - 0.10 Thousands/µL   CBC and differential   Result Value Ref Range    WBC 7.07 4.31 - 10.16 Thousand/uL    RBC 3.49 (L) 3.81 - 5.12 Million/uL    Hemoglobin 9.0 (L) 11.5 - 15.4 g/dL    Hematocrit 26.3 (L) 34.8 - 46.1 %    MCV 75 (L) 82 - 98 fL    MCH 25.8 (L) 26.8 - 34.3 pg MCHC 34.2 31.4 - 37.4 g/dL    RDW 16.4 (H) 11.6 - 15.1 %    MPV 9.4 8.9 - 12.7 fL    Platelets 480 (H) 383 - 390 Thousands/uL    nRBC 0 /100 WBCs    Neutrophils Relative 60 43 - 75 %    Immat GRANS % 1 0 - 2 %    Lymphocytes Relative 27 14 - 44 %    Monocytes Relative 7 4 - 12 %    Eosinophils Relative 5 0 - 6 %    Basophils Relative 0 0 - 1 %    Neutrophils Absolute 4.19 1.85 - 7.62 Thousands/µL    Immature Grans Absolute 0.05 0.00 - 0.20 Thousand/uL    Lymphocytes Absolute 1.93 0.60 - 4.47 Thousands/µL    Monocytes Absolute 0.52 0.17 - 1.22 Thousand/µL    Eosinophils Absolute 0.36 0.00 - 0.61 Thousand/µL    Basophils Absolute 0.02 0.00 - 0.10 Thousands/µL   CBC and differential   Result Value Ref Range    WBC 7.42 4.31 - 10.16 Thousand/uL    RBC 3.39 (L) 3.81 - 5.12 Million/uL    Hemoglobin 8.9 (L) 11.5 - 15.4 g/dL    Hematocrit 25.6 (L) 34.8 - 46.1 %    MCV 76 (L) 82 - 98 fL    MCH 26.3 (L) 26.8 - 34.3 pg    MCHC 34.8 31.4 - 37.4 g/dL    RDW 16.2 (H) 11.6 - 15.1 %    MPV 9.4 8.9 - 12.7 fL    Platelets 377 (H) 672 - 390 Thousands/uL    nRBC 0 /100 WBCs    Neutrophils Relative 66 43 - 75 %    Immat GRANS % 1 0 - 2 %    Lymphocytes Relative 23 14 - 44 %    Monocytes Relative 6 4 - 12 %    Eosinophils Relative 4 0 - 6 %    Basophils Relative 0 0 - 1 %    Neutrophils Absolute 4.93 1.85 - 7.62 Thousands/µL    Immature Grans Absolute 0.05 0.00 - 0.20 Thousand/uL    Lymphocytes Absolute 1.68 0.60 - 4.47 Thousands/µL    Monocytes Absolute 0.41 0.17 - 1.22 Thousand/µL    Eosinophils Absolute 0.32 0.00 - 0.61 Thousand/µL    Basophils Absolute 0.03 0.00 - 0.10 Thousands/µL   CBC and differential   Result Value Ref Range    WBC 7.36 4.31 - 10.16 Thousand/uL    RBC 3.59 (L) 3.81 - 5.12 Million/uL    Hemoglobin 9.2 (L) 11.5 - 15.4 g/dL    Hematocrit 27.0 (L) 34.8 - 46.1 %    MCV 75 (L) 82 - 98 fL    MCH 25.6 (L) 26.8 - 34.3 pg    MCHC 34.1 31.4 - 37.4 g/dL    RDW 16.4 (H) 11.6 - 15.1 %    MPV 9.3 8.9 - 12.7 fL    Platelets 424 (H) 149 - 390 Thousands/uL    nRBC 0 /100 WBCs    Neutrophils Relative 65 43 - 75 %    Immat GRANS % 1 0 - 2 %    Lymphocytes Relative 23 14 - 44 %    Monocytes Relative 6 4 - 12 %    Eosinophils Relative 5 0 - 6 %    Basophils Relative 0 0 - 1 %    Neutrophils Absolute 4.81 1.85 - 7.62 Thousands/µL    Immature Grans Absolute 0.04 0.00 - 0.20 Thousand/uL    Lymphocytes Absolute 1.71 0.60 - 4.47 Thousands/µL    Monocytes Absolute 0.45 0.17 - 1.22 Thousand/µL    Eosinophils Absolute 0.33 0.00 - 0.61 Thousand/µL    Basophils Absolute 0.02 0.00 - 0.10 Thousands/µL   CBC and differential   Result Value Ref Range    WBC 8.30 4.31 - 10.16 Thousand/uL    RBC 3.40 (L) 3.81 - 5.12 Million/uL    Hemoglobin 8.8 (L) 11.5 - 15.4 g/dL    Hematocrit 25.7 (L) 34.8 - 46.1 %    MCV 76 (L) 82 - 98 fL    MCH 25.9 (L) 26.8 - 34.3 pg    MCHC 34.2 31.4 - 37.4 g/dL    RDW 16.1 (H) 11.6 - 15.1 %    MPV 9.4 8.9 - 12.7 fL    Platelets 151 (H) 857 - 390 Thousands/uL    nRBC 0 /100 WBCs    Neutrophils Relative 60 43 - 75 %    Immat GRANS % 1 0 - 2 %    Lymphocytes Relative 28 14 - 44 %    Monocytes Relative 6 4 - 12 %    Eosinophils Relative 5 0 - 6 %    Basophils Relative 0 0 - 1 %    Neutrophils Absolute 4.97 1.85 - 7.62 Thousands/µL    Immature Grans Absolute 0.04 0.00 - 0.20 Thousand/uL    Lymphocytes Absolute 2.32 0.60 - 4.47 Thousands/µL    Monocytes Absolute 0.49 0.17 - 1.22 Thousand/µL    Eosinophils Absolute 0.45 0.00 - 0.61 Thousand/µL    Basophils Absolute 0.03 0.00 - 0.10 Thousands/µL   ECG 12 lead   Result Value Ref Range    Ventricular Rate 108 BPM    Atrial Rate 108 BPM    MI Interval 146 ms    QRSD Interval 83 ms    QT Interval 292 ms    QTC Interval 392 ms    P Axis 60 degrees    QRS Axis 56 degrees    T Wave Axis 24 degrees   Type and screen   Result Value Ref Range    ABO Grouping B     Rh Factor Positive     Antibody Screen Negative     Specimen Expiration Date [id number]    Prepare Leukoreduced RBC: 2 Units   Result Value Ref Range    Unit Product Code N7443O41     Unit Number [id number]     Unit ABO B     Unit RH POS     Crossmatch Compatible     Unit Dispense Status Return to [facility]     Unit Product Volume 350 mL    Unit Product Code U3530C11     Unit Number [id number]     Unit ABO B     Unit RH POS     Crossmatch Compatible     Unit Dispense Status Return to [facility]     Unit Product Volume 350 ml   Tissue Exam   Result Value Ref Range    Case Report       Surgical Pathology Report                         Case: [id number]                                   Authorizing Provider:  Lida Blum MD             Collected:           03/29/2023 1219              Ordering Location:     [facility]      Received:            03/29/2023 [address] Operating Room                                                      Pathologist:           Saint Service, MD                                                     Specimen:    Leg, Left, left aka                                                                        Final Diagnosis       A. Leg, Left, left aka:  - As per gross examination. Additional Information       All reported additional testing was performed with appropriately reactive controls. These tests were developed and their performance characteristics determined by [facility] or appropriate performing facility, though some tests may be performed on tissues which have not been validated for performance characteristics (such as staining performed on alcohol exposed cell blocks and decalcified tissues). Results should be interpreted with caution and in the context of the patients’ clinical condition. These tests may not be cleared or approved by the U.S. Food and Drug Administration, though the FDA has determined that such clearance or approval is not necessary.  These tests are used for clinical purposes and they should not be regarded as investigational or for research. This laboratory has been approved by [facility], designated as a high-complexity laboratory and is qualified to perform these tests. Gross Description       A. The specimen is received fresh, labeled with the patient's name and hospital number, and is designated " left AKA". 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 The medial calf exhibits a scar measuring 11.5 x 1.5 cm with surrounding  area of black-gray discoloration measuring 3.5 x 2.0 cm which is located 6.7 cm from the nearest skin and soft tissue margin and 12.0 cm from the proximal bone margin. The lateral calf also exhibits a black-gray area of discoloration measuring 3.0 x 2.5 cm which is located 17.4 cm from the nearest skin and soft tissue margin and 26.9 cm from the proximal bone margin. The lateral foot exhibits an area of ulceration slightly below the fifth digit measuring 2.5 x 2.0 cm which is located 48 cm from the skin and soft tissue margin and 58.0 cm from the proximal bone margin. All 5 digits exhibit a purple discoloration. The dorsal aspect of the foot exhibits a gray area of discoloration measuring 3.0 x 1.5 cm which is located 49.7 cm from the nearest skin and soft tissue margin and 59.7 cm from proximal bone margin. A silver metallic stent is identified within the proximal vasculature. Photographs are taken. Gross examination only. No sections submitted.     Otf       Fingerstick Glucose (POCT)   Result Value Ref Range    POC Glucose 265 (H) 65 - 140 mg/dl   Fingerstick Glucose (POCT)   Result Value Ref Range    POC Glucose 372 (H) 65 - 140 mg/dl   Fingerstick Glucose (POCT)   Result Value Ref Range    POC Glucose 344 (H) 65 - 140 mg/dl   Fingerstick Glucose (POCT)   Result Value Ref Range    POC Glucose 231 (H) 65 - 140 mg/dl   Fingerstick Glucose (POCT)   Result Value Ref Range    POC Glucose 200 (H) 65 - 140 mg/dl   Fingerstick Glucose (POCT)   Result Value Ref Range    POC Glucose 191 (H) 65 - 140 mg/dl   Fingerstick Glucose (POCT)   Result Value Ref Range    POC Glucose 198 (H) 65 - 140 mg/dl   Fingerstick Glucose (POCT)   Result Value Ref Range    POC Glucose 198 (H) 65 - 140 mg/dl   Fingerstick Glucose (POCT)   Result Value Ref Range    POC Glucose 188 (H) 65 - 140 mg/dl   Fingerstick Glucose (POCT)   Result Value Ref Range    POC Glucose 183 (H) 65 - 140 mg/dl   Fingerstick Glucose (POCT)   Result Value Ref Range    POC Glucose 168 (H) 65 - 140 mg/dl   Fingerstick Glucose (POCT)   Result Value Ref Range    POC Glucose 160 (H) 65 - 140 mg/dl   Fingerstick Glucose (POCT)   Result Value Ref Range    POC Glucose 156 (H) 65 - 140 mg/dl   Fingerstick Glucose (POCT)   Result Value Ref Range    POC Glucose 161 (H) 65 - 140 mg/dl   Fingerstick Glucose (POCT)   Result Value Ref Range    POC Glucose 118 65 - 140 mg/dl   Fingerstick Glucose (POCT)   Result Value Ref Range    POC Glucose 69 65 - 140 mg/dl   Fingerstick Glucose (POCT)   Result Value Ref Range    POC Glucose 176 (H) 65 - 140 mg/dl   Fingerstick Glucose (POCT)   Result Value Ref Range    POC Glucose 190 (H) 65 - 140 mg/dl   Fingerstick Glucose (POCT)   Result Value Ref Range    POC Glucose 173 (H) 65 - 140 mg/dl   Fingerstick Glucose (POCT)   Result Value Ref Range    POC Glucose 197 (H) 65 - 140 mg/dl   Fingerstick Glucose (POCT)   Result Value Ref Range    POC Glucose 199 (H) 65 - 140 mg/dl   Fingerstick Glucose (POCT)   Result Value Ref Range    POC Glucose 200 (H) 65 - 140 mg/dl   Fingerstick Glucose (POCT)   Result Value Ref Range    POC Glucose 213 (H) 65 - 140 mg/dl   Fingerstick Glucose (POCT)   Result Value Ref Range    POC Glucose 113 65 - 140 mg/dl   Fingerstick Glucose (POCT)   Result Value Ref Range    POC Glucose 117 65 - 140 mg/dl   Fingerstick Glucose (POCT)   Result Value Ref Range    POC Glucose 228 (H) 65 - 140 mg/dl   Fingerstick Glucose (POCT)   Result Value Ref Range    POC Glucose 243 (H) 65 - 140 mg/dl   Fingerstick Glucose (POCT)   Result Value Ref Range    POC Glucose 216 (H) 65 - 140 mg/dl   Fingerstick Glucose (POCT)   Result Value Ref Range    POC Glucose 140 65 - 140 mg/dl   Fingerstick Glucose (POCT)   Result Value Ref Range    POC Glucose 87 65 - 140 mg/dl   Fingerstick Glucose (POCT)   Result Value Ref Range    POC Glucose 90 65 - 140 mg/dl   Fingerstick Glucose (POCT)   Result Value Ref Range    POC Glucose 83 65 - 140 mg/dl   Fingerstick Glucose (POCT)   Result Value Ref Range    POC Glucose 72 65 - 140 mg/dl   Fingerstick Glucose (POCT)   Result Value Ref Range    POC Glucose 65 65 - 140 mg/dl   Fingerstick Glucose (POCT)   Result Value Ref Range    POC Glucose 64 (L) 65 - 140 mg/dl   Fingerstick Glucose (POCT)   Result Value Ref Range    POC Glucose 126 65 - 140 mg/dl   Fingerstick Glucose (POCT)   Result Value Ref Range    POC Glucose 228 (H) 65 - 140 mg/dl   Fingerstick Glucose (POCT)   Result Value Ref Range    POC Glucose 247 (H) 65 - 140 mg/dl   Fingerstick Glucose (POCT)   Result Value Ref Range    POC Glucose 293 (H) 65 - 140 mg/dl   Fingerstick Glucose (POCT)   Result Value Ref Range    POC Glucose 351 (H) 65 - 140 mg/dl   Fingerstick Glucose (POCT)   Result Value Ref Range    POC Glucose 219 (H) 65 - 140 mg/dl   Fingerstick Glucose (POCT)   Result Value Ref Range    POC Glucose 167 (H) 65 - 140 mg/dl   Fingerstick Glucose (POCT)   Result Value Ref Range    POC Glucose 99 65 - 140 mg/dl   Fingerstick Glucose (POCT)   Result Value Ref Range    POC Glucose 141 (H) 65 - 140 mg/dl   Fingerstick Glucose (POCT)   Result Value Ref Range    POC Glucose 384 (H) 65 - 140 mg/dl   Fingerstick Glucose (POCT)   Result Value Ref Range    POC Glucose 111 65 - 140 mg/dl   Fingerstick Glucose (POCT)   Result Value Ref Range    POC Glucose 282 (H) 65 - 140 mg/dl   Fingerstick Glucose (POCT)   Result Value Ref Range    POC Glucose 372 (H) 65 - 140 mg/dl   Fingerstick Glucose (POCT)   Result Value Ref Range    POC Glucose 450 (H) 65 - 140 mg/dl   Fingerstick Glucose (POCT)   Result Value Ref Range    POC Glucose 197 (H) 65 - 140 mg/dl   Fingerstick Glucose (POCT)   Result Value Ref Range    POC Glucose 349 (H) 65 - 140 mg/dl   Fingerstick Glucose (POCT)   Result Value Ref Range    POC Glucose 299 (H) 65 - 140 mg/dl   Fingerstick Glucose (POCT)   Result Value Ref Range    POC Glucose 275 (H) 65 - 140 mg/dl   Fingerstick Glucose (POCT)   Result Value Ref Range    POC Glucose 179 (H) 65 - 140 mg/dl   Fingerstick Glucose (POCT)   Result Value Ref Range    POC Glucose 132 65 - 140 mg/dl   Fingerstick Glucose (POCT)   Result Value Ref Range    POC Glucose 297 (H) 65 - 140 mg/dl   Fingerstick Glucose (POCT)   Result Value Ref Range    POC Glucose 305 (H) 65 - 140 mg/dl   Fingerstick Glucose (POCT)   Result Value Ref Range    POC Glucose 163 (H) 65 - 140 mg/dl   Fingerstick Glucose (POCT)   Result Value Ref Range    POC Glucose 118 65 - 140 mg/dl   Fingerstick Glucose (POCT)   Result Value Ref Range    POC Glucose 175 (H) 65 - 140 mg/dl   Fingerstick Glucose (POCT)   Result Value Ref Range    POC Glucose 174 (H) 65 - 140 mg/dl   Fingerstick Glucose (POCT)   Result Value Ref Range    POC Glucose 187 (H) 65 - 140 mg/dl   Fingerstick Glucose (POCT)   Result Value Ref Range    POC Glucose 113 65 - 140 mg/dl   Fingerstick Glucose (POCT)   Result Value Ref Range    POC Glucose 241 (H) 65 - 140 mg/dl   Fingerstick Glucose (POCT)   Result Value Ref Range    POC Glucose 239 (H) 65 - 140 mg/dl   Fingerstick Glucose (POCT)   Result Value Ref Range    POC Glucose 214 (H) 65 - 140 mg/dl   Fingerstick Glucose (POCT)   Result Value Ref Range    POC Glucose 144 (H) 65 - 140 mg/dl   Fingerstick Glucose (POCT)   Result Value Ref Range    POC Glucose 277 (H) 65 - 140 mg/dl   Fingerstick Glucose (POCT)   Result Value Ref Range    POC Glucose 275 (H) 65 - 140 mg/dl   Fingerstick Glucose (POCT)   Result Value Ref Range    POC Glucose 173 (H) 65 - 140 mg/dl   Fingerstick Glucose (POCT)   Result Value Ref Range    POC Glucose 230 (H) 65 - 140 mg/dl   Fingerstick Glucose (POCT)   Result Value Ref Range    POC Glucose 240 (H) 65 - 140 mg/dl   Fingerstick Glucose (POCT)   Result Value Ref Range    POC Glucose 226 (H) 65 - 140 mg/dl   Fingerstick Glucose (POCT)   Result Value Ref Range    POC Glucose 210 (H) 65 - 140 mg/dl   Fingerstick Glucose (POCT)   Result Value Ref Range    POC Glucose 165 (H) 65 - 140 mg/dl   Fingerstick Glucose (POCT)   Result Value Ref Range    POC Glucose 222 (H) 65 - 140 mg/dl   Fingerstick Glucose (POCT)   Result Value Ref Range    POC Glucose 155 (H) 65 - 140 mg/dl   Fingerstick Glucose (POCT)   Result Value Ref Range    POC Glucose 194 (H) 65 - 140 mg/dl   Fingerstick Glucose (POCT)   Result Value Ref Range    POC Glucose 141 (H) 65 - 140 mg/dl   Fingerstick Glucose (POCT)   Result Value Ref Range    POC Glucose 125 65 - 140 mg/dl   Fingerstick Glucose (POCT)   Result Value Ref Range    POC Glucose 143 (H) 65 - 140 mg/dl   Fingerstick Glucose (POCT)   Result Value Ref Range    POC Glucose 158 (H) 65 - 140 mg/dl   Fingerstick Glucose (POCT)   Result Value Ref Range    POC Glucose 223 (H) 65 - 140 mg/dl   Fingerstick Glucose (POCT)   Result Value Ref Range    POC Glucose 98 65 - 140 mg/dl   Fingerstick Glucose (POCT)   Result Value Ref Range    POC Glucose 189 (H) 65 - 140 mg/dl   Fingerstick Glucose (POCT)   Result Value Ref Range    POC Glucose 276 (H) 65 - 140 mg/dl   Fingerstick Glucose (POCT)   Result Value Ref Range    POC Glucose 289 (H) 65 - 140 mg/dl   Fingerstick Glucose (POCT)   Result Value Ref Range    POC Glucose 216 (H) 65 - 140 mg/dl   Fingerstick Glucose (POCT)   Result Value Ref Range    POC Glucose 106 65 - 140 mg/dl   Fingerstick Glucose (POCT)   Result Value Ref Range    POC Glucose 151 (H) 65 - 140 mg/dl   Fingerstick Glucose (POCT)   Result Value Ref Range    POC Glucose 217 (H) 65 - 140 mg/dl   Fingerstick Glucose (POCT)   Result Value Ref Range    POC Glucose 217 (H) 65 - 140 mg/dl   Fingerstick Glucose (POCT)   Result Value Ref Range    POC Glucose 145 (H) 65 - 140 mg/dl   Fingerstick Glucose (POCT)   Result Value Ref Range    POC Glucose 284 (H) 65 - 140 mg/dl   Fingerstick Glucose (POCT)   Result Value Ref Range    POC Glucose 170 (H) 65 - 140 mg/dl   Fingerstick Glucose (POCT)   Result Value Ref Range    POC Glucose 165 (H) 65 - 140 mg/dl   Fingerstick Glucose (POCT)   Result Value Ref Range    POC Glucose 142 (H) 65 - 140 mg/dl   Fingerstick Glucose (POCT)   Result Value Ref Range    POC Glucose 180 (H) 65 - 140 mg/dl   Fingerstick Glucose (POCT)   Result Value Ref Range    POC Glucose 231 (H) 65 - 140 mg/dl   Fingerstick Glucose (POCT)   Result Value Ref Range    POC Glucose 162 (H) 65 - 140 mg/dl   Fingerstick Glucose (POCT)   Result Value Ref Range    POC Glucose 133 65 - 140 mg/dl   Fingerstick Glucose (POCT)   Result Value Ref Range    POC Glucose 147 (H) 65 - 140 mg/dl   Fingerstick Glucose (POCT)   Result Value Ref Range    POC Glucose 120 65 - 140 mg/dl   Fingerstick Glucose (POCT)   Result Value Ref Range    POC Glucose 192 (H) 65 - 140 mg/dl   Fingerstick Glucose (POCT)   Result Value Ref Range    POC Glucose 161 (H) 65 - 140 mg/dl   Fingerstick Glucose (POCT)   Result Value Ref Range    POC Glucose 97 65 - 140 mg/dl   Fingerstick Glucose (POCT)   Result Value Ref Range    POC Glucose 130 65 - 140 mg/dl   Fingerstick Glucose (POCT)   Result Value Ref Range    POC Glucose 121 65 - 140 mg/dl   Fingerstick Glucose (POCT)   Result Value Ref Range    POC Glucose 170 (H) 65 - 140 mg/dl   Fingerstick Glucose (POCT)   Result Value Ref Range    POC Glucose 177 (H) 65 - 140 mg/dl   Fingerstick Glucose (POCT)   Result Value Ref Range    POC Glucose 91 65 - 140 mg/dl   Fingerstick Glucose (POCT)   Result Value Ref Range    POC Glucose 106 65 - 140 mg/dl   Fingerstick Glucose (POCT)   Result Value Ref Range    POC Glucose 152 (H) 65 - 140 mg/dl   Fingerstick Glucose (POCT)   Result Value Ref Range    POC Glucose 148 (H) 65 - 140 mg/dl   Fingerstick Glucose (POCT)   Result Value Ref Range    POC Glucose 153 (H) 65 - 140 mg/dl   Fingerstick Glucose (POCT)   Result Value Ref Range    POC Glucose 149 (H) 65 - 140 mg/dl   Fingerstick Glucose (POCT)   Result Value Ref Range    POC Glucose 115 65 - 140 mg/dl   Fingerstick Glucose (POCT)   Result Value Ref Range    POC Glucose 85 65 - 140 mg/dl   Fingerstick Glucose (POCT)   Result Value Ref Range    POC Glucose 141 (H) 65 - 140 mg/dl   Fingerstick Glucose (POCT)   Result Value Ref Range    POC Glucose 90 65 - 140 mg/dl   Fingerstick Glucose (POCT)   Result Value Ref Range    POC Glucose 142 (H) 65 - 140 mg/dl   Fingerstick Glucose (POCT)   Result Value Ref Range    POC Glucose 148 (H) 65 - 140 mg/dl   Fingerstick Glucose (POCT)   Result Value Ref Range    POC Glucose 127 65 - 140 mg/dl   Fingerstick Glucose (POCT)   Result Value Ref Range    POC Glucose 108 65 - 140 mg/dl   Fingerstick Glucose (POCT)   Result Value Ref Range    POC Glucose 141 (H) 65 - 140 mg/dl   Fingerstick Glucose (POCT)   Result Value Ref Range    POC Glucose 133 65 - 140 mg/dl   Fingerstick Glucose (POCT)   Result Value Ref Range    POC Glucose 155 (H) 65 - 140 mg/dl   Fingerstick Glucose (POCT)   Result Value Ref Range    POC Glucose 64 (L) 65 - 140 mg/dl   Fingerstick Glucose (POCT)   Result Value Ref Range    POC Glucose 146 (H) 65 - 140 mg/dl   Fingerstick Glucose (POCT)   Result Value Ref Range    POC Glucose 219 (H) 65 - 140 mg/dl   Fingerstick Glucose (POCT)   Result Value Ref Range    POC Glucose 204 (H) 65 - 140 mg/dl   Fingerstick Glucose (POCT)   Result Value Ref Range    POC Glucose 255 (H) 65 - 140 mg/dl Fingerstick Glucose (POCT)   Result Value Ref Range    POC Glucose 160 (H) 65 - 140 mg/dl   Fingerstick Glucose (POCT)   Result Value Ref Range    POC Glucose 150 (H) 65 - 140 mg/dl   Fingerstick Glucose (POCT)   Result Value Ref Range    POC Glucose 135 65 - 140 mg/dl   Fingerstick Glucose (POCT)   Result Value Ref Range    POC Glucose 88 65 - 140 mg/dl   Fingerstick Glucose (POCT)   Result Value Ref Range    POC Glucose 173 (H) 65 - 140 mg/dl   Manual Differential(PHLEBS Do Not Order)   Result Value Ref Range    Segmented % 88 (H) 43 - 75 %    Bands % 4 0 - 8 %    Lymphocytes % 4 (L) 14 - 44 %    Monocytes % 3 (L) 4 - 12 %    Eosinophils, % 0 0 - 6 %    Basophils % 0 0 - 1 %    Atypical Lymphocytes % 1 (H) <=0 %    Absolute Neutrophils 12.66 (H) 1.85 - 7.62 Thousand/uL    Lymphocytes Absolute 0.55 (L) 0.60 - 4.47 Thousand/uL    Monocytes Absolute 0.41 0.00 - 1.22 Thousand/uL    Eosinophils Absolute 0.00 0.00 - 0.40 Thousand/uL    Basophils Absolute 0.00 0.00 - 0.10 Thousand/uL    Total Counted      RBC Morphology Present     Anisocytosis Present     Microcytes Present     Poikilocytes Present     Polychromasia Present     Target Cells Present     Platelet Estimate Adequate Adequate       Orders Placed This Encounter   Procedures   • Glucometer test strips         Current Medications     Current Outpatient Medications   Medication Sig Dispense Refill   • DULoxetine (CYMBALTA) 20 mg capsule Take 2 capsules (40 mg total) by mouth daily 60 capsule 2   • Lancet Devices (Lancing Device) MISC Place on the skin if needed (use as directed w/ glucometer to check blood sugar) Use as directed 1 each 0   • lidocaine (LIDODERM) 5 % Apply 1 patch topically over 12 hours every 24 hours Remove & Discard patch within 12 hours or as directed by MD 15 patch 1   • melatonin 3 mg Take 2 tablets (6 mg total) by mouth daily at bedtime 60 tablet 2   • nicotine (NICODERM CQ) 21 mg/24 hr TD 24 hr patch Place 1 patch on the skin over 24 hours daily 28 patch 3   • nicotine polacrilex (NICORETTE) 2 mg gum Chew 1 each (2 mg total) as needed for smoking cessation 100 each 0   • Advair -21 MCG/ACT inhaler INHALE 2 PUFFS BY MOUTH TWICE DAILY . RINSE MOUTH AFTER USE 12 g 0   • albuterol (PROVENTIL HFA,VENTOLIN HFA) 90 mcg/act inhaler INHALE 2 PUFFS EVERY 4 (FOUR) HOURS AS NEEDED FOR WHEEZING OR SHORTNESS OF BREATH 8.5 g 5   • Alcohol Swabs (Pharmacist Choice Alcohol) PADS USE 3-4 TIMES AS DIRECTED. 100 each 3   • aspirin 81 mg chewable tablet Chew 1 tablet (81 mg total) daily Do not start before April 20, 2023.  (Patient not taking: Reported on 6/27/2023)  0   • atorvastatin (LIPITOR) 40 mg tablet Take 1 tablet (40 mg total) by mouth daily with dinner  0   • bisacodyl (DULCOLAX) 10 mg suppository Insert 1 suppository (10 mg total) into the rectum daily as needed for constipation (Patient not taking: Reported on 6/27/2023) 12 suppository 0   • Blood Glucose Monitoring Suppl (True Metrix Air Glucose Meter) w/Device KIT use as directed 1 kit 0   • Blood Pressure Monitoring (Blood Pressure Monitor Automat) KATLYN Use Daily as Directed 1 Device 0   • Comfort EZ Pen Needles 33G X 4 MM MISC USE AS DIRECTED 300 each 0   • gabapentin (NEURONTIN) 300 mg capsule Take 1 capsule (300 mg total) by mouth 3 (three) times a day  0   • Global Inject Ease Lancets 30G MISC USE 3 (THREE) TIMES A  each 0   • HYDROmorphone (DILAUDID) 2 mg tablet Take 1 (2mg) to 2 tabs (4mg) by mouth every 4 hours as needed for moderate to severe pain (Patient not taking: Reported on 6/27/2023) 12 tablet 0   • hydrOXYzine HCL (ATARAX) 50 mg tablet Take 1 tablet (50 mg total) by mouth every 8 (eight) hours as needed for itching or anxiety 30 tablet 0   • insulin glargine (LANTUS) 100 units/mL subcutaneous injection Inject 45 Units under the skin daily at bedtime 10 mL 0   • insulin lispro (HumaLOG) 100 units/mL injection Inject 2-12 Units under the skin 3 (three) times a day before meals  0   • insulin lispro (HumaLOG) 100 units/mL injection Inject 2-12 Units under the skin daily at bedtime  0   • insulin lispro (HumaLOG) 100 units/mL injection Inject 12 Units under the skin daily with dinner  0   • insulin lispro (HumaLOG) 100 units/mL injection Inject 20 Units under the skin daily with lunch  0   • insulin lispro (HumaLOG) 100 units/mL injection Inject 20 Units under the skin daily with breakfast Do not start before April 20, 2023.  0   • Insulin Pen Needle (Comfort EZ Pen Needles) 32G X 4 MM MISC Inject under the skin 4 (four) times a day (before meals and at bedtime) 120 each 5   • lisinopril (ZESTRIL) 20 mg tablet Take 1 tablet (20 mg total) by mouth daily 90 tablet 3   • metFORMIN (GLUCOPHAGE) 1000 MG tablet TAKE ONE (1) TABLET BY MOUTH TWICE DAILY WITH FOOD 60 tablet 0   • methocarbamol (ROBAXIN) 750 mg tablet Take 1 tablet (750 mg total) by mouth every 6 (six) hours as needed for muscle spasms  0   • polyethylene glycol (MIRALAX) 17 g packet Take 17 g by mouth daily  0   • prazosin (MINIPRESS) 1 mg capsule Take 1 mg by mouth daily at bedtime     • QUEtiapine (SEROquel) 200 mg tablet Take by mouth daily at bedtime     • rivaroxaban (XARELTO) 15 mg tablet Take 1 tablet (15 mg total) by mouth 2 (two) times a day with meals for 3 doses (Patient not taking: Reported on 5/22/2023) 3 tablet 0   • rivaroxaban (XARELTO) 20 mg tablet Take 1 tablet (20 mg total) by mouth daily with breakfast Do not start before April 21, 2023.  0   • senna-docusate sodium (SENOKOT S) 8.6-50 mg per tablet Take 2 tablets by mouth 2 (two) times a day  0   • simethicone (MYLICON) 80 mg chewable tablet Chew 1 tablet (80 mg total) every 6 (six) hours as needed for flatulence 30 tablet 0   • topiramate (TOPAMAX) 25 mg tablet Take 1 tablet (25 mg total) by mouth 2 (two) times a day  0   • Trulicity 1.5 FW/7.3KQ injection As directed     • white petrolatum-mineral oil (EUCERIN,HYDROCERIN) cream Apply topically 3 (three) times a day as needed (dry skin) (Patient not taking: Reported on 6/27/2023)  0     No current facility-administered medications for this visit. ALLERGIES:  No Known Allergies    Health Maintenance     Health Maintenance   Topic Date Due   • Hepatitis C Screening  Never done   • Medicare Annual Wellness Visit (AWV)  Never done   • COVID-19 Vaccine (1) Never done   • DM Eye Exam  Never done   • Kidney Health Evaluation: Albumin/Creatinine Ratio  04/10/2019   • Pneumococcal Vaccine: Pediatrics (0 to 5 Years) and At-Risk Patients (6 to 59 Years) (2 - PCV) 04/10/2019   • Diabetic Foot Exam  08/11/2021   • HEMOGLOBIN A1C  06/20/2023   • Influenza Vaccine (1) 09/01/2023   • BMI: Followup Plan  12/20/2023   • Kidney Health Evaluation: GFR  06/07/2024   • BMI: Adult  06/27/2024   • Cervical Cancer Screening  02/10/2025   • HIV Screening  Completed   • HIB Vaccine  Aged Out   • IPV Vaccine  Aged Out   • Hepatitis A Vaccine  Aged Out   • Meningococcal ACWY Vaccine  Aged Out   • HPV Vaccine  Aged Out     Immunization History   Administered Date(s) Administered   • INFLUENZA 04/10/2018, 04/10/2018, 11/28/2018, 11/28/2018   • Pneumococcal Polysaccharide PPV23 04/10/2018   • Tdap 04/10/2018         Da Milner DO   [address]  7/10/2023  7:10 PM    Parts of this note were dictated using tribalX dictation software and may have sounds-like errors due to variation in pronunciation.

## 2023-07-10 NOTE — ASSESSMENT & PLAN NOTE
-pt is s/p L BKA, recent hospital visit for R thigh pain w/ unremarkable workup  -currently notes bilateral leg pain, as well as stating she has some sensation of a phantom limb on L side  -Currently taking gabapentin 300 TID as well as cymbalta 20 daily  -She states that she was offered xanax and percocet after recent ED visit, although they were not prescribed due to her initially stating she did not want something she mignt not take      Plan  -Discussed risks of xanax and percocet, including falls, sedation, dependence  -Will increase cymbalta to 40mg daily, f/u 3-4 weeks

## 2023-07-10 NOTE — TELEPHONE ENCOUNTER
Lidoderm patches re-sent. It looks like that is the only brand of patches there is available. There are a few other options for nicotine gum/lozenges available but it is unclear if her insurance will cover those either. I can try putting one in if she'd like. Her best option for the melatonin is probably just OTC if they won't cover it, it is available very cheaply OTC.

## 2023-07-11 ENCOUNTER — TELEPHONE (OUTPATIENT)
Dept: FAMILY MEDICINE CLINIC | Facility: CLINIC | Age: 38
End: 2023-07-11

## 2023-07-11 ENCOUNTER — PATIENT OUTREACH (OUTPATIENT)
Dept: CASE MANAGEMENT | Facility: OTHER | Age: 38
End: 2023-07-11

## 2023-07-11 NOTE — PROGRESS NOTES
Outpatient Care Management Note:  Outreach call placed to Ms. Randy Martinez. .  Introduced myself and the Better You program.  Explained that it is a voluntary program involving myself (an RN), a SW CM and CM OC as needed to assist with symptom management and social needs that may be present. Ms. Randy Martinez was just waking up and asks that another call be made later in the week. Advised her that either myself or Valorie PATEL CM would call back later in the week.

## 2023-07-11 NOTE — PROGRESS NOTES
Patient is requesting diclofenac cream . If you can also represcribe the lidoderm patch in a dose that is covered which may be 4 % also the nicotin patch and gum are not covered by insurance I am not sure if there is a preparation just listed as nicotine not including the word nicoderm CQ which is a brand name. You many be able to try nicotine patch for the patient if you feel this is appropriate for her.  Thank you

## 2023-07-12 DIAGNOSIS — M79.604 BILATERAL LEG PAIN: Primary | ICD-10-CM

## 2023-07-12 DIAGNOSIS — M79.605 BILATERAL LEG PAIN: Primary | ICD-10-CM

## 2023-07-12 NOTE — TELEPHONE ENCOUNTER
I put Voltaren gel in for her. It looks like the lidoderm patch will be covered when the prior auth goes through. We have instructed her to call her pharmacy regarding possible options for NRT, it does not look like anything is covered. Thank you.

## 2023-07-13 ENCOUNTER — TELEPHONE (OUTPATIENT)
Dept: FAMILY MEDICINE CLINIC | Facility: CLINIC | Age: 38
End: 2023-07-13

## 2023-07-13 ENCOUNTER — PATIENT OUTREACH (OUTPATIENT)
Dept: CASE MANAGEMENT | Facility: HOSPITAL | Age: 38
End: 2023-07-13

## 2023-07-13 DIAGNOSIS — E11.65 UNCONTROLLED TYPE 2 DIABETES MELLITUS WITH HYPERGLYCEMIA (HCC): ICD-10-CM

## 2023-07-13 RX ORDER — DIPHENHYDRAMINE HCL 25 MG
TABLET ORAL 2 TIMES DAILY
Qty: 1 KIT | Refills: 0 | Status: SHIPPED | OUTPATIENT
Start: 2023-07-13 | End: 2023-07-20

## 2023-07-13 NOTE — TELEPHONE ENCOUNTER
Caller: Vilma Lu     Doctor: Mary Grace Baldwin    Reason for call: Vilma Lu call concern about the Patient Roman Macias. Vilma Lu ask if the doctor can place an order of her a glucose monitor for she can check her sugar levels. Vilma Lu states that the patient is in a lot of pain.

## 2023-07-13 NOTE — PROGRESS NOTES
JORGE RENDON reviewed RN CM's note regarding outreach. JORGE RENDON placed call to pt and introduced self. Pt stated that she was with her nurse and asked for a call to be scheduled for a later time. JORGE RENDON scheduled call with pt for 12pm on 7/14. Note routed to RN CM.

## 2023-07-14 ENCOUNTER — PATIENT OUTREACH (OUTPATIENT)
Dept: CASE MANAGEMENT | Facility: HOSPITAL | Age: 38
End: 2023-07-14

## 2023-07-14 NOTE — PROGRESS NOTES
JORGE RENDON outreached pt to offer information on BYP. Patient did not answer. JORGE RENDON left message asking patient to return call. JORGE RENDON received call from pt and introduced self. Offered information on BYP. Pt is interested in enrolling and completing assessment with JORGE RENDON today. Pt is living in a hotel and she pays for it. $847 every two weeks. Pt receives SSDI $870. Pt's wife receives SSDI as well ($613). Pt receives SNAP benefits. Pt's mother in law drives her places, but she is elderly and does not take her everywhere. Pt shared that her left leg was amputated in March. She does not have her prosthetic leg yet - it should arrive next week. Pt stated she is having trouble getting around the hotel room d/t it being very small and having to use a walker. Pt stated her wife is afraid to leave her alone d/t fear of falling. Wife will not go to the food pantry. Pt has a  with [employer]. Pt did not want to disclose who her  is. Pt stated "please cancel this" and would not discuss reason for changing her mind regarding enrollment. Pt hung up. Patient does not consent to the Better You Program at this time and does not want to be contacted at a later date. JORGE RENDON closed OP CM episodes and removed JORGE RENDON and RN CM from care team. Note routed to RN CM.

## 2023-07-20 DIAGNOSIS — E11.42 TYPE 2 DIABETES MELLITUS WITH DIABETIC POLYNEUROPATHY, WITH LONG-TERM CURRENT USE OF INSULIN (HCC): Primary | ICD-10-CM

## 2023-07-20 DIAGNOSIS — Z79.4 TYPE 2 DIABETES MELLITUS WITH DIABETIC POLYNEUROPATHY, WITH LONG-TERM CURRENT USE OF INSULIN (HCC): Primary | ICD-10-CM

## 2023-07-24 ENCOUNTER — TELEPHONE (OUTPATIENT)
Dept: FAMILY MEDICINE CLINIC | Facility: CLINIC | Age: 38
End: 2023-07-24

## 2023-07-24 NOTE — TELEPHONE ENCOUNTER
Encounter for forms      Patient requires a form to be completed.  Patient is aware of 7-10 day turn around time.    Please refer to the following information:       Type of Form: Order for signature     Date of Visit (if applicable):     Doctor: Toby kennedy     Expected date:     How patient would like to receive form: faxed back     Fax number: [phone]      Patient phone number:       Copy scanned to encounter. Copy provided to patient. Original in (X) team folder to be completed.

## 2023-07-25 ENCOUNTER — TELEPHONE (OUTPATIENT)
Dept: FAMILY MEDICINE CLINIC | Facility: CLINIC | Age: 38
End: 2023-07-25

## 2023-07-25 NOTE — TELEPHONE ENCOUNTER
Encounter for forms      Patient requires a form to be completed. Patient is aware of 7-10 day turn around time. Please refer to the following information:       Type of Form: [facility]    Date of Visit (if applicable): 1/74/7042    Doctor: Aleja Barry    Expected date: How patient would like to receive form:    Fax number: [phone]    Patient phone number: [phone]      Copy scanned to encounter. Copy provided to patient. Original in (X) team folder to be completed.

## 2023-07-28 ENCOUNTER — TELEPHONE (OUTPATIENT)
Dept: FAMILY MEDICINE CLINIC | Facility: CLINIC | Age: 38
End: 2023-07-28

## 2023-07-28 NOTE — TELEPHONE ENCOUNTER
Encounter for forms      Patient requires a form to be completed. Patient is aware of 7-10 day turn around time. Please refer to the following information:       Type of Form: [place]    Date of Visit (if applicable):     Doctor: Holden Garza    Expected date: How patient would like to receive form:    Fax number: [phone]    Patient phone number:       Copy scanned to encounter. Copy provided to patient. Original in (X) team folder to be completed.

## 2023-07-29 ENCOUNTER — APPOINTMENT (EMERGENCY)
Dept: CT IMAGING | Facility: HOSPITAL | Age: 38
End: 2023-07-29
Payer: MEDICARE

## 2023-07-29 ENCOUNTER — APPOINTMENT (EMERGENCY)
Dept: RADIOLOGY | Facility: HOSPITAL | Age: 38
End: 2023-07-29
Payer: MEDICARE

## 2023-07-29 ENCOUNTER — HOSPITAL ENCOUNTER (EMERGENCY)
Facility: HOSPITAL | Age: 38
Discharge: HOME | End: 2023-07-29
Attending: EMERGENCY MEDICINE | Admitting: EMERGENCY MEDICINE
Payer: MEDICARE

## 2023-07-29 VITALS
SYSTOLIC BLOOD PRESSURE: 133 MMHG | HEART RATE: 96 BPM | RESPIRATION RATE: 18 BRPM | TEMPERATURE: 98.4 F | DIASTOLIC BLOOD PRESSURE: 76 MMHG | OXYGEN SATURATION: 98 %
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
## 2023-07-29 DIAGNOSIS — S70.12XA CONTUSION OF LEFT THIGH, INITIAL ENCOUNTER: Primary | ICD-10-CM

## 2023-07-29 DIAGNOSIS — M79.652 LEFT THIGH PAIN: ICD-10-CM

## 2023-07-29 LAB
ABO GROUP BLD: NORMAL
ANION GAP SERPL CALCULATED.3IONS-SCNC: 9 MMOL/L
APTT PPP: 35 SECONDS (ref 23–37)
BASOPHILS # BLD AUTO: 0.03 THOUSANDS/ÂΜL (ref 0–0.1)
BASOPHILS NFR BLD AUTO: 0 % (ref 0–1)
BLD GP AB SCN SERPL QL: NEGATIVE
BUN SERPL-MCNC: 15 MG/DL (ref 5–25)
CALCIUM SERPL-MCNC: 8.9 MG/DL (ref 8.4–10.2)
CHLORIDE SERPL-SCNC: 105 MMOL/L (ref 96–108)
CO2 SERPL-SCNC: 23 MMOL/L (ref 21–32)
CREAT SERPL-MCNC: 0.6 MG/DL (ref 0.6–1.3)
EOSINOPHIL # BLD AUTO: 0.3 THOUSAND/ÂΜL (ref 0–0.61)
EOSINOPHIL NFR BLD AUTO: 3 % (ref 0–6)
ERYTHROCYTE [DISTWIDTH] IN BLOOD BY AUTOMATED COUNT: 18.3 % (ref 11.6–15.1)
GFR SERPL CREATININE-BSD FRML MDRD: 117 ML/MIN/1.73SQ M
GLUCOSE SERPL-MCNC: 234 MG/DL (ref 65–140)
HCT VFR BLD AUTO: 33.5 % (ref 34.8–46.1)
HGB BLD-MCNC: 11.1 G/DL (ref 11.5–15.4)
IMM GRANULOCYTES # BLD AUTO: 0.05 THOUSAND/UL (ref 0–0.2)
IMM GRANULOCYTES NFR BLD AUTO: 1 % (ref 0–2)
INR PPP: 1.19 (ref 0.84–1.19)
LYMPHOCYTES # BLD AUTO: 2.57 THOUSANDS/ÂΜL (ref 0.6–4.47)
LYMPHOCYTES NFR BLD AUTO: 25 % (ref 14–44)
MCH RBC QN AUTO: 21.5 PG (ref 26.8–34.3)
MCHC RBC AUTO-ENTMCNC: 33.1 G/DL (ref 31.4–37.4)
MCV RBC AUTO: 65 FL (ref 82–98)
MONOCYTES # BLD AUTO: 0.51 THOUSAND/ÂΜL (ref 0.17–1.22)
MONOCYTES NFR BLD AUTO: 5 % (ref 4–12)
NEUTROPHILS # BLD AUTO: 6.76 THOUSANDS/ÂΜL (ref 1.85–7.62)
NEUTS SEG NFR BLD AUTO: 66 % (ref 43–75)
NRBC BLD AUTO-RTO: 0 /100 WBCS
PLATELET # BLD AUTO: 454 THOUSANDS/UL (ref 149–390)
PMV BLD AUTO: 10 FL (ref 8.9–12.7)
POTASSIUM SERPL-SCNC: 4 MMOL/L (ref 3.5–5.3)
PROTHROMBIN TIME: 15.4 SECONDS (ref 11.6–14.5)
RBC # BLD AUTO: 5.17 MILLION/UL (ref 3.81–5.12)
RH BLD: POSITIVE
SODIUM SERPL-SCNC: 137 MMOL/L (ref 135–147)
SPECIMEN EXPIRATION DATE: NORMAL
WBC # BLD AUTO: 10.22 THOUSAND/UL (ref 4.31–10.16)

## 2023-07-29 PROCEDURE — 73706 CT ANGIO LWR EXTR W/O&W/DYE: CPT

## 2023-07-29 PROCEDURE — 73552 X-RAY EXAM OF FEMUR 2/>: CPT

## 2023-07-29 PROCEDURE — G1004 CDSM NDSC: HCPCS

## 2023-07-29 PROCEDURE — 96376 TX/PRO/DX INJ SAME DRUG ADON: CPT

## 2023-07-29 PROCEDURE — 86850 RBC ANTIBODY SCREEN: CPT

## 2023-07-29 PROCEDURE — 96374 THER/PROPH/DIAG INJ IV PUSH: CPT

## 2023-07-29 PROCEDURE — 80048 BASIC METABOLIC PNL TOTAL CA: CPT

## 2023-07-29 PROCEDURE — 36415 COLL VENOUS BLD VENIPUNCTURE: CPT

## 2023-07-29 PROCEDURE — 85025 COMPLETE CBC W/AUTO DIFF WBC: CPT

## 2023-07-29 PROCEDURE — 85730 THROMBOPLASTIN TIME PARTIAL: CPT

## 2023-07-29 PROCEDURE — 86900 BLOOD TYPING SEROLOGIC ABO: CPT

## 2023-07-29 PROCEDURE — 99285 EMERGENCY DEPT VISIT HI MDM: CPT | Performed by: EMERGENCY MEDICINE

## 2023-07-29 PROCEDURE — 96361 HYDRATE IV INFUSION ADD-ON: CPT

## 2023-07-29 PROCEDURE — 99284 EMERGENCY DEPT VISIT MOD MDM: CPT

## 2023-07-29 PROCEDURE — 85610 PROTHROMBIN TIME: CPT

## 2023-07-29 PROCEDURE — 86901 BLOOD TYPING SEROLOGIC RH(D): CPT

## 2023-07-29 RX ORDER — HYDROMORPHONE HCL/PF 1 MG/ML
1 SYRINGE (ML) INJECTION ONCE
Status: COMPLETED | OUTPATIENT
Start: 2023-07-29 | End: 2023-07-29

## 2023-07-29 RX ORDER — HYDROMORPHONE HYDROCHLORIDE 2 MG/1
2 TABLET ORAL EVERY 4 HOURS PRN
Qty: 20 TABLET | Refills: 0 | Status: SHIPPED | OUTPATIENT
Start: 2023-07-29 | End: 2023-08-02

## 2023-07-29 RX ADMIN — HYDROMORPHONE HYDROCHLORIDE 1 MG: 1 INJECTION, SOLUTION INTRAMUSCULAR; INTRAVENOUS; SUBCUTANEOUS at 04:44

## 2023-07-29 RX ADMIN — HYDROMORPHONE HYDROCHLORIDE 1 MG: 1 INJECTION, SOLUTION INTRAMUSCULAR; INTRAVENOUS; SUBCUTANEOUS at 05:59

## 2023-07-29 RX ADMIN — HYDROMORPHONE HYDROCHLORIDE 1 MG: 1 INJECTION, SOLUTION INTRAMUSCULAR; INTRAVENOUS; SUBCUTANEOUS at 03:57

## 2023-07-29 RX ADMIN — IOHEXOL 100 ML: 350 INJECTION, SOLUTION INTRAVENOUS at 05:22

## 2023-07-29 RX ADMIN — SODIUM CHLORIDE 500 ML: 0.9 INJECTION, SOLUTION INTRAVENOUS at 03:57

## 2023-07-29 NOTE — ED ATTENDING ATTESTATION
7/29/2023  I, Renay Navarro MD, saw and evaluated the patient. I have discussed the patient with the resident/non-physician practitioner and agree with the resident's/non-physician practitioner's findings, Plan of Care, and MDM as documented in the resident's/non-physician practitioner's note, except where noted. All available labs and Radiology studies were reviewed. I was present for key portions of any procedure(s) performed by the resident/non-physician practitioner and I was immediately available to provide assistance. At this point I agree with the current assessment done in the Emergency Department. I have conducted an independent evaluation of this patient a history and physical is as follows: Patient is a 40year old female whose left AKA stump slid on the bed and patient fell onto her left AKA stump. (+) increased pain. No head injury or other injury. Patient states only dilaudid IV helps her pain. Patient is on gabapentin. LMP -earlier this month. S/p TL. [facility] on 6/27/23 for s/p L AKA. [facility] website checked on this patient and last Rx filled was on 1/28/22 for pregabalin for 90 day supply. Patient requesting toradol as well I explained that she should not get this since she is on xarelto. NCAT. PERRL. Moist mucous membranes. Lungs clear. Heart regular without murmur. Abdomen soft and nontender. Good bowel sounds. R LE normal and nontender. L AKA stump with tenderness and swelling. DDx including but not limited to: Doubt intracranial injury, concussion, cervical injury, intrathoracic injury, intraabdominal injury; extremity injury--fracture, dislocation, strain, sprain, contusion, hematoma, active bleeding. Will check labs and x-ray and CT and give IV dilaudid and IVFs.      ED Course         Critical Care Time  Procedures

## 2023-07-29 NOTE — ED PROVIDER NOTES
History  Chief Complaint   Patient presents with   • Fall     From bed. With recent hx of AKA. Pt states she takes gabapentin, but has nothing for breakthrough pain. Pt recently d/c'd home from rehab. 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 No head strike, no LOC. Patient reports she was making her bed, with her stump balanced on the bed, when it slipped off and she fell. Pain 9/10, located in left lower extremity, close to site of amputation. Recently DC'd from rehab facility. Denies dizziness, syncope, vision changes, SOB, chest pain.     On exam, patient in mild distress, VS stable, except /98. Left AKA, with well-healed scar. No swelling, no redness, tenderness to palpation, with contusion above amputation site. R distal pulse present, no RLE swelling. Prior to Admission Medications   Prescriptions Last Dose Informant Patient Reported? Taking? Advair -21 MCG/ACT inhaler  Outside Facility (Specify), Self No No   Sig: INHALE 2 PUFFS BY MOUTH TWICE DAILY . RINSE MOUTH AFTER USE   Alcohol Swabs (Pharmacist Choice Alcohol) PADS  Outside Facility (Specify), Self No No   Sig: USE 3-4 TIMES AS DIRECTED.    Blood Pressure Monitoring (Blood Pressure Monitor Automat) [address] Mulligan (Specify), Self No No   Sig: Use Daily as Directed   Comfort EZ Pen Needles 33G X 4 MM [facility]  Outside Facility (Specify), Self No No   Sig: USE AS DIRECTED   DULoxetine (CYMBALTA) 20 mg capsule   No No   Sig: Take 2 capsules (40 mg total) by mouth daily   Diclofenac Sodium (VOLTAREN) 1 %   No No   Sig: Apply 2 g topically 4 (four) times a day For pain to affected area   Global Inject Ease Lancets 30G MISC  Outside Facility (Specify), Self No No   Sig: USE 3 (THREE) TIMES A DAY   HYDROmorphone (DILAUDID) 2 mg tablet  Outside Facility (Specify), Self No No   Sig: Take 1 (2mg) to 2 tabs (4mg) by mouth every 4 hours as needed for moderate to severe pain   Patient not taking: Reported on 6/27/2023   Insulin Pen Needle (Comfort EZ Pen Needles) 32G X 4 MM MISC  Outside Facility (Specify), Self No No   Sig: Inject under the skin 4 (four) times a day (before meals and at bedtime)   Lancet Devices (Lancing Device) MISC   No No   Sig: Place on the skin if needed (use as directed w/ glucometer to check blood sugar) Use as directed   QUEtiapine (SEROquel) 200 mg tablet  Outside Facility (Specify), Self Yes No   Sig: Take by mouth daily at bedtime   Trulicity 1.5 UI/9.4JT injection  Outside Facility (Specify), Self Yes No   Sig: As directed   albuterol (PROVENTIL HFA,VENTOLIN HFA) 90 mcg/act inhaler  Outside Facility (Specify), Self No No   Sig: INHALE 2 PUFFS EVERY 4 (FOUR) HOURS AS NEEDED FOR WHEEZING OR SHORTNESS OF BREATH   aspirin 81 mg chewable tablet  Outside Facility (Specify), Self No No   Sig: Chew 1 tablet (81 mg total) daily Do not start before April 20, 2023.    Patient not taking: Reported on 6/27/2023   atorvastatin (LIPITOR) 40 mg tablet  Outside Facility (Specify), Self No No   Sig: Take 1 tablet (40 mg total) by mouth daily with dinner   bisacodyl (DULCOLAX) 10 mg suppository  Outside Facility (Specify), Self No No   Sig: Insert 1 suppository (10 mg total) into the rectum daily as needed for constipation   Patient not taking: Reported on 6/27/2023   gabapentin (NEURONTIN) 300 mg capsule  Outside Facility (Specify), Self No No   Sig: Take 1 capsule (300 mg total) by mouth 3 (three) times a day   hydrOXYzine HCL (ATARAX) 50 mg tablet  Outside Facility (Specify), Self No No   Sig: Take 1 tablet (50 mg total) by mouth every 8 (eight) hours as needed for itching or anxiety   insulin glargine (LANTUS) 100 units/mL subcutaneous injection  Outside Facility (Specify), Self No No   Sig: Inject 45 Units under the skin daily at bedtime   insulin lispro (HumaLOG) 100 units/mL injection  Outside Facility (Specify), Self No No   Sig: Inject 2-12 Units under the skin 3 (three) times a day before meals   insulin lispro (HumaLOG) 100 units/mL injection  Outside Facility (Specify), Self No No   Sig: Inject 2-12 Units under the skin daily at bedtime   insulin lispro (HumaLOG) 100 units/mL injection  Outside Facility (Specify), Self No No   Sig: Inject 12 Units under the skin daily with dinner   insulin lispro (HumaLOG) 100 units/mL injection  Outside Facility (Specify), Self No No   Sig: Inject 20 Units under the skin daily with lunch   insulin lispro (HumaLOG) 100 units/mL injection  Outside Facility (Specify), Self No No   Sig: Inject 20 Units under the skin daily with breakfast Do not start before April 20, 2023.    lidocaine (LIDODERM) 5 %   No No   Sig: Apply 1 patch topically over 12 hours every 24 hours Remove & Discard patch within 12 hours or as directed by MD   lisinopril (ZESTRIL) 20 mg tablet  Self No No   Sig: Take 1 tablet (20 mg total) by mouth daily   melatonin 3 mg   No No   Sig: Take 2 tablets (6 mg total) by mouth daily at bedtime   metFORMIN (GLUCOPHAGE) 1000 MG tablet  Outside Facility (Specify), Self No No   Sig: TAKE ONE (1) TABLET BY MOUTH TWICE DAILY WITH FOOD   methocarbamol (ROBAXIN) 750 mg tablet  Outside Facility (Specify), Self No No   Sig: Take 1 tablet (750 mg total) by mouth every 6 (six) hours as needed for muscle spasms   nicotine (NICODERM CQ) 21 mg/24 hr TD 24 hr patch   No No   Sig: Place 1 patch on the skin over 24 hours daily   nicotine polacrilex (NICORETTE) 2 mg gum   No No   Sig: Chew 1 each (2 mg total) as needed for smoking cessation   polyethylene glycol (MIRALAX) 17 g packet  Outside Facility (Specify), Self No No   Sig: Take 17 g by mouth daily   prazosin (MINIPRESS) 1 mg capsule  Outside Facility (Specify), Self Yes No   Sig: Take 1 mg by mouth daily at bedtime   rivaroxaban (XARELTO) 15 mg tablet  Outside Facility (Specify) No No   Sig: Take 1 tablet (15 mg total) by mouth 2 (two) times a day with meals for 3 doses   Patient not taking: Reported on 2023   rivaroxaban (XARELTO) 20 mg tablet  Outside Facility (Specify), Self No No   Sig: Take 1 tablet (20 mg total) by mouth daily with breakfast Do not start before 2023. senna-docusate sodium (SENOKOT S) 8.6-50 mg per tablet  Outside Facility (Specify), Self No No   Sig: Take 2 tablets by mouth 2 (two) times a day   simethicone (MYLICON) 80 mg chewable tablet  Outside Facility (Specify), Self No No   Sig: Chew 1 tablet (80 mg total) every 6 (six) hours as needed for flatulence   topiramate (TOPAMAX) 25 mg tablet  Outside Facility (Specify), Self No No   Sig: Take 1 tablet (25 mg total) by mouth 2 (two) times a day   white petrolatum-mineral oil (EUCERIN,HYDROCERIN) cream  Outside Facility (Specify), Self No No   Sig: Apply topically 3 (three) times a day as needed (dry skin)   Patient not taking: Reported on 2023      Facility-Administered Medications: None       Past Medical History:   Diagnosis Date   • Asthma    • Atherosclerosis    • Bipolar 1 disorder (HCC)    • COPD (chronic obstructive pulmonary disease) ([facility])    • Depression    • Diabetes mellitus ([facility])    • Hypertension    • Psychiatric disorder     cutting history   • PTSD (post-traumatic stress disorder)    • Schizoaffective disorder ([facility])    • Tendonitis        Past Surgical History:   Procedure Laterality Date   • AMPUTATION ABOVE KNEE (AKA) Left 3/29/2023    Procedure: AMPUTATION ABOVE KNEE (AKA); Surgeon: Lida Blum MD;  Location: [facility];  Service: Vascular   • BYPASS FEMORAL-POPLITEAL Left 2021    Procedure: Left Common Femoral Below Knee to Popliteal Bypass with Insitu GSV graft.   Left Lower Extremity Angiogram;  Surgeon: Lida Blum MD;  Location: [facility];  Service: Vascular   •  SECTION     • EAR SURGERY     • IR LOWER EXTREMITY ANGIOGRAM  2021   • IR LOWER EXTREMITY ANGIOGRAM  2021   • TN SLCTV CATHJ 3RD+ ORD SLCTV ABDL PEL/LXTR [facility] Left 3/24/2023    Procedure: Left leg angiogram;  Surgeon: Lester Gupta DO;  Location: [facility];  Service: Vascular   • TUBAL LIGATION         Family History   Problem Relation Age of Onset   • Hypertension Mother    • Diabetes Mother    • HIV Mother    • Heart disease Mother    • No Known Problems Father      I have reviewed and agree with the history as documented. E-Cigarette/Vaping   • E-Cigarette Use Never User      E-Cigarette/Vaping Substances   • Nicotine No    • THC No    • CBD No    • Flavoring No    • Other No    • Unknown No      Social History     Tobacco Use   • Smoking status: Former     Packs/day: 1.00     Years: 20.00     Total pack years: 20.00     Types: Cigarettes     Quit date: 3/24/2023     Years since quittin.3   • Smokeless tobacco: Former     Quit date: 2022   Vaping Use   • Vaping Use: Never used   Substance Use Topics   • Alcohol use: Not Currently     Comment: not currently   • Drug use: Not Currently     Types: Cocaine, Marijuana, "Crack" cocaine     Comment: 1 years clean from crack cocaine since         Review of Systems   Constitutional: Negative for chills and fever. Eyes: Negative for pain and visual disturbance. Respiratory: Negative for cough and shortness of breath. Cardiovascular: Negative for chest pain and palpitations. Gastrointestinal: Negative for abdominal pain and vomiting. Genitourinary: Negative for dysuria and hematuria. Musculoskeletal: Positive for myalgias. Negative for arthralgias. Skin: Negative for color change and rash. Neurological: Negative for dizziness, seizures and syncope. All other systems reviewed and are negative.       Physical Exam  ED Triage Vitals   Temperature Pulse Respirations Blood Pressure SpO2   23 0301 23 0301 23 0302 23 0302 23 030   98.4 °F (36.9 °C) 98 22 (!) 176/90 99 %      Temp Source Heart Rate Source Patient Position - Orthostatic VS BP Location FiO2 (%)   23 07/29/23 0530 07/29/23 0302 07/29/23 0302 --   Oral Monitor Sitting Right arm       Pain Score       --                    Orthostatic Vital Signs  Vitals:    07/29/23 0430 07/29/23 0530 07/29/23 0600 07/29/23 0700   BP: 129/63 139/82 137/81 133/76   Pulse: 102 101 99 96   Patient Position - Orthostatic VS: Lying Lying Lying Lying       Physical Exam  Vitals and nursing note reviewed. Constitutional:       General: She is not in acute distress. Appearance: She is well-developed. HENT:      Head: Normocephalic and atraumatic. Eyes:      Conjunctiva/sclera: Conjunctivae normal.   Cardiovascular:      Rate and Rhythm: Normal rate and regular rhythm. Pulses: Normal pulses. Heart sounds: No murmur heard. Pulmonary:      Effort: Pulmonary effort is normal. No respiratory distress. Breath sounds: Normal breath sounds. Abdominal:      Palpations: Abdomen is soft. Tenderness: There is no abdominal tenderness. Musculoskeletal:         General: Tenderness and signs of injury present. No swelling. Cervical back: Neck supple. Right lower leg: No edema. Comments: Left AKA, well-healed scar, contusion above site of amputation. Skin:     General: Skin is warm and dry. Neurological:      Mental Status: She is alert and oriented to person, place, and time.    Psychiatric:         Mood and Affect: Mood normal.         ED Medications  Medications   sodium chloride 0.9 % bolus 500 mL (0 mL Intravenous Stopped 7/29/23 0457)   HYDROmorphone (DILAUDID) injection 1 mg (1 mg Intravenous Given 7/29/23 0357)   HYDROmorphone (DILAUDID) injection 1 mg (1 mg Intravenous Given 7/29/23 0444)   iohexol (OMNIPAQUE) 350 MG/ML injection (SINGLE-DOSE) 100 mL (100 mL Intravenous Given 7/29/23 0522)   HYDROmorphone (DILAUDID) injection 1 mg (1 mg Intravenous Given 7/29/23 0559)       Diagnostic Studies  Results Reviewed     Procedure Component Value Units Date/Time    Basic metabolic panel [[id number]] (Abnormal) Collected: 07/29/23 0357    Lab Status: Final result Specimen: Blood from Arm, Left Updated: 07/29/23 0509     Sodium 137 mmol/L      Potassium 4.0 mmol/L      Chloride 105 mmol/L      CO2 23 mmol/L      ANION GAP 9 mmol/L      BUN 15 mg/dL      Creatinine 0.60 mg/dL      Glucose 234 mg/dL      Calcium 8.9 mg/dL      eGFR 117 ml/min/1.73sq m     Narrative:      [facility] (CKD):   •  Stage 1 with normal or high GFR (GFR > 90 mL/min/1.73 square meters)  •  Stage 2 Mild CKD (GFR = 60-89 mL/min/1.73 square meters)  •  Stage 3A Moderate CKD (GFR = 45-59 mL/min/1.73 square meters)  •  Stage 3B Moderate CKD (GFR = 30-44 mL/min/1.73 square meters)  •  Stage 4 Severe CKD (GFR = 15-29 mL/min/1.73 square meters)  •  Stage 5 End Stage CKD (GFR <15 mL/min/1.73 square meters)  Note: GFR calculation is accurate only with a steady state creatinine    Protime-INR [[id number]]  (Abnormal) Collected: 07/29/23 0357    Lab Status: Final result Specimen: Blood from Arm, Left Updated: 07/29/23 0449     Protime 15.4 seconds      INR 1.19    APTT [[id number]]  (Normal) Collected: 07/29/23 0357    Lab Status: Final result Specimen: Blood from Arm, Left Updated: 07/29/23 0449     PTT 35 seconds     CBC and differential [[id number]]  (Abnormal) Collected: 07/29/23 0357    Lab Status: Final result Specimen: Blood from Arm, Left Updated: 07/29/23 0414     WBC 10.22 Thousand/uL      RBC 5.17 Million/uL      Hemoglobin 11.1 g/dL      Hematocrit 33.5 %      MCV 65 fL      MCH 21.5 pg      MCHC 33.1 g/dL      RDW 18.3 %      MPV 10.0 fL      Platelets 220 Thousands/uL      nRBC 0 /100 WBCs      Neutrophils Relative 66 %      Immat GRANS % 1 %      Lymphocytes Relative 25 %      Monocytes Relative 5 %      Eosinophils Relative 3 %      Basophils Relative 0 %      Neutrophils Absolute 6.76 Thousands/µL      Immature Grans Absolute 0.05 Thousand/uL      Lymphocytes Absolute 2.57 Thousands/µL      Monocytes Absolute 0.51 Thousand/µL      Eosinophils Absolute 0.30 Thousand/µL      Basophils Absolute 0.03 Thousands/µL                  CTA lower extremity left w wo contrast   ED Interpretation by Glendy Montemayor MD (07/29 0710)   Read on vRad. COMPARISON: CT LOWER EXTREMITY W CONTRAST LEFT 3/3/2023 10:56 PM  FINDINGS:   Left femoral/popliteal arteries: Both iliac arteries are patent. There is a long left SFA stent which is occluded. Left infrapopliteal arteries: Status post left AKA. Right femoral/popliteal arteries: There is patency of the visualized portion of the right SFA and proximal popliteal.   Stomach and bowel: The images through the lower abdomen and pelvis demonstrate the visualized GI tract structures to be normal.   Appendix: What is seen of the appendix is normal.   Bladder: The bladder is nearly empty. Reproductive: Involuted fibroid uterus. No adnexal mass. Bones/joints: There is an AKA a on the left. The femoral stump is intact. There is no stump fracture. Soft tissues: There is what appears to be minor bruising along the distal lateral border of the left AKA stump. There is no convincing hematoma in the soft tissues. IMPRESSION:   1. What is seen of the right lower extremity    is normal. There is patency of the right SFA and popliteal flow   2. Status post AKA on the left. There is a long left SFA stent which is occluded. The only opacified arterial structures in the stump are branches of the profunda femoris. 3. There is ill-defined haziness along the course of the suture lines in the distal end of the stump. 4. There is no evidence of a stump hematoma. No extravasated high density contrast is identified.         XR femur 2 vw left   ED Interpretation by Glendy Montemayor MD (07/29 0191)   No fx            Procedures  Procedures      ED Course  ED Course as of 07/29/23 0939   Sat Jul 29, 2023   0344 Given patient's presentation, history and exam findings, differentials include LLE bleed, femur fracture, hematoma, compartment syndrome. Patient rates pain 9/10, states Dilaudid and Toradol IV are usually helpful in managing pain. Patient given 1 mg IV Dilaudid. X-ray of LLE, and CTA w/wo contrast ordered to rule out bleeding. CBC, BMP, PT, PTT, type and screen ordered as well. Patient will be kept n.p.o. except for meds in case surgical intervention necessary in event of bleed. 0553 XR femur 2 vw left  ED interpretation by me. No fracture. 0553 2 additional doses of 1 mg IV Dilaudid given due to pain. 6038 Basic metabolic panel(!)  Blood glucose 234, patient has history of insulin-dependent DM.   0556 Protime-INR(!)  PT 15.4, INR 1.19. Patient on Xarelto   0710 CTA lower extremity left w wo contrast  Read on vRad. Findings and impression in ED interpretation. Given CT findings, no indication for further intervention at this time. Explained to patient that pain at amputation site could be due to nerve damage, and may take more time to heal. Will discharge patient with Dilaudid for pain management, and have patient follow-up with PCP. Medical Decision Making  See ED Course. Amount and/or Complexity of Data Reviewed  Labs: ordered. Decision-making details documented in ED Course. Radiology: ordered and independent interpretation performed. Decision-making details documented in ED Course. Risk  Prescription drug management. Disposition  Final diagnoses:   Fall, initial encounter   Contusion of left thigh, initial encounter   Left thigh pain     Time reflects when diagnosis was documented in both MDM as applicable and the Disposition within this note     Time User Action Codes Description Comment    7/29/2023  7:38 AM Mini Fisher [R33. TIDA] Fall, initial encounter     7/29/2023  7:38 AM Mini Fisher [S70.12XA] Contusion of left thigh, initial encounter     7/29/2023 7:38 AM Fabiana Loaiza Add [Z24.071] Left thigh pain     7/29/2023  7:38 AM Fabiana Loaiza Modify [X23. IMPI] Fall, initial encounter     7/29/2023  7:38 AM Fabiana Loaiza Modify [S70.12XA] Contusion of left thigh, initial encounter       ED Disposition     ED Disposition   Discharge    Condition   Stable    Date/Time   Sat Jul 29, 2023  7:38 AM    Comment   Rowena Talamantes discharge to home/self care. Follow-up Information     Follow up With Specialties Details Why Contact Info    Deepali Price MD Family Medicine, Obstetrics and Gynecology, Obstetrics, Gynecology Schedule an appointment as soon as possible for a visit in 2 weeks  [address] [place] [address]  [phone]            Patient's Medications   Discharge Prescriptions    HYDROMORPHONE (DILAUDID) 2 MG TABLET    Take 1 tablet (2 mg total) by mouth every 4 (four) hours as needed for moderate pain for up to 4 days Max Daily Amount: 12 mg       Start Date: 7/29/2023 End Date: 8/2/2023       Order Dose: 2 mg       Quantity: 20 tablet    Refills: 0     No discharge procedures on file. PDMP Review       Value Time User    PDMP Reviewed  Yes 7/29/2023  3:01 AM Fabiana Loaiza MD           ED Provider  Attending physically available and evaluated Rowena Talamantes. I managed the patient along with the ED Attending.     Electronically Signed by MD Adán Rene MD  07/29/23 0068

## 2023-07-31 ENCOUNTER — TELEPHONE (OUTPATIENT)
Dept: FAMILY MEDICINE CLINIC | Facility: CLINIC | Age: 38
End: 2023-07-31

## 2023-07-31 NOTE — TELEPHONE ENCOUNTER
Encounter for forms      Patient requires a form to be completed.  Patient is aware of 7-10 day turn around time.    Please refer to the following information:       Type of Form: Home health certification and plan of care     Date of Visit (if applicable):     Doctor: Benja     Expected date:     How patient would like to receive form: fax     Fax number    Patient phone number:       Copy scanned to encounter. Copy provided to patient. Original in (X) team folder to be completed.

## 2023-08-01 ENCOUNTER — TELEPHONE (OUTPATIENT)
Dept: FAMILY MEDICINE CLINIC | Facility: CLINIC | Age: 38
End: 2023-08-01

## 2023-08-01 NOTE — TELEPHONE ENCOUNTER
Encounter for forms      Patient requires a form to be completed. Patient is aware of 7-10 day turn around time. Please refer to the following information:       Type of Form: [place]     Date of Visit (if applicable):     Doctor: Michael Gonzalez    Expected date: How patient would like to receive form:    Fax number: [phone]    Patient phone number:       Copy scanned to encounter. Copy provided to patient. Original in (X) team folder to be completed.

## 2023-08-01 NOTE — TELEPHONE ENCOUNTER
Caller: Physical Therapy       Doctor: Raul Sow      Reason for call: Physical Therapy called to inform the Dr. Wallis Leader the patient heart rate was elevate it today it was 120.       Number:[phone]

## 2023-08-03 NOTE — TELEPHONE ENCOUNTER
I called the Patient and spoke to her and patient states that she is feeling find and she'll call the office to schedule appointment when needed.

## 2023-08-07 DIAGNOSIS — I10 HYPERTENSION: ICD-10-CM

## 2023-08-07 DIAGNOSIS — J44.9 CHRONIC OBSTRUCTIVE PULMONARY DISEASE, UNSPECIFIED COPD TYPE (HCC): ICD-10-CM

## 2023-08-07 DIAGNOSIS — I73.9 PAD (PERIPHERAL ARTERY DISEASE) (HCC): ICD-10-CM

## 2023-08-07 DIAGNOSIS — J44.1 CHRONIC OBSTRUCTIVE PULMONARY DISEASE WITH ACUTE EXACERBATION (HCC): ICD-10-CM

## 2023-08-08 DIAGNOSIS — I73.9 PAD (PERIPHERAL ARTERY DISEASE) (HCC): ICD-10-CM

## 2023-08-08 DIAGNOSIS — J44.1 CHRONIC OBSTRUCTIVE PULMONARY DISEASE WITH ACUTE EXACERBATION (HCC): ICD-10-CM

## 2023-08-08 DIAGNOSIS — I10 HYPERTENSION: ICD-10-CM

## 2023-08-08 DIAGNOSIS — J44.9 CHRONIC OBSTRUCTIVE PULMONARY DISEASE, UNSPECIFIED COPD TYPE (HCC): ICD-10-CM

## 2023-08-08 RX ORDER — ALBUTEROL SULFATE 90 UG/1
2 AEROSOL, METERED RESPIRATORY (INHALATION) EVERY 4 HOURS PRN
Qty: 8.5 G | Refills: 5 | Status: CANCELLED | OUTPATIENT
Start: 2023-08-08

## 2023-08-08 RX ORDER — LISINOPRIL 20 MG/1
20 TABLET ORAL DAILY
Qty: 90 TABLET | Refills: 3 | Status: CANCELLED | OUTPATIENT
Start: 2023-08-08

## 2023-08-08 RX ORDER — ATORVASTATIN CALCIUM 40 MG/1
40 TABLET, FILM COATED ORAL
Qty: 90 TABLET | Refills: 3 | Status: CANCELLED | OUTPATIENT
Start: 2023-08-08

## 2023-08-08 RX ORDER — ATORVASTATIN CALCIUM 40 MG/1
40 TABLET, FILM COATED ORAL
Qty: 90 TABLET | Refills: 3 | Status: SHIPPED | OUTPATIENT
Start: 2023-08-08 | End: 2023-08-09 | Stop reason: SDUPTHER

## 2023-08-08 RX ORDER — PRAZOSIN HYDROCHLORIDE 1 MG/1
1 CAPSULE ORAL
Qty: 90 CAPSULE | Refills: 3 | Status: SHIPPED | OUTPATIENT
Start: 2023-08-08 | End: 2023-08-09 | Stop reason: SDUPTHER

## 2023-08-08 RX ORDER — PRAZOSIN HYDROCHLORIDE 1 MG/1
1 CAPSULE ORAL
Qty: 90 CAPSULE | Refills: 3 | Status: CANCELLED | OUTPATIENT
Start: 2023-08-08

## 2023-08-08 RX ORDER — FLUTICASONE PROPIONATE AND SALMETEROL XINAFOATE 115; 21 UG/1; UG/1
2 AEROSOL, METERED RESPIRATORY (INHALATION) 2 TIMES DAILY
Qty: 12 G | Refills: 1 | Status: CANCELLED | OUTPATIENT
Start: 2023-08-08

## 2023-08-08 RX ORDER — FLUTICASONE PROPIONATE AND SALMETEROL XINAFOATE 115; 21 UG/1; UG/1
2 AEROSOL, METERED RESPIRATORY (INHALATION) 2 TIMES DAILY
Qty: 12 G | Refills: 1 | Status: SHIPPED | OUTPATIENT
Start: 2023-08-08 | End: 2023-08-09 | Stop reason: SDUPTHER

## 2023-08-08 RX ORDER — ALBUTEROL SULFATE 90 UG/1
2 AEROSOL, METERED RESPIRATORY (INHALATION) EVERY 4 HOURS PRN
Qty: 8.5 G | Refills: 5 | Status: SHIPPED | OUTPATIENT
Start: 2023-08-08 | End: 2023-08-09 | Stop reason: SDUPTHER

## 2023-08-08 RX ORDER — ASPIRIN 81 MG/1
81 TABLET, CHEWABLE ORAL DAILY
Qty: 30 TABLET | Refills: 2 | Status: CANCELLED | OUTPATIENT
Start: 2023-08-08

## 2023-08-08 RX ORDER — LISINOPRIL 20 MG/1
20 TABLET ORAL DAILY
Qty: 90 TABLET | Refills: 3 | Status: SHIPPED | OUTPATIENT
Start: 2023-08-08 | End: 2023-08-09 | Stop reason: SDUPTHER

## 2023-08-08 RX ORDER — ASPIRIN 81 MG/1
81 TABLET, CHEWABLE ORAL DAILY
Qty: 30 TABLET | Refills: 2 | Status: SHIPPED | OUTPATIENT
Start: 2023-08-08 | End: 2023-08-09 | Stop reason: SDUPTHER

## 2023-08-08 NOTE — TELEPHONE ENCOUNTER
Caller: Patient       Doctor: Lorenzo Alcazar    Reason for call: Patient call stating the Pharmacy did not receive the prescription. I called the Pharmacy and that state they did not receive the prescription they had a power outage and was not able to get the computers up until 2. They ask if we can resend the prescription over again.       Number: [phone]

## 2023-08-09 DIAGNOSIS — J44.1 CHRONIC OBSTRUCTIVE PULMONARY DISEASE WITH ACUTE EXACERBATION (HCC): ICD-10-CM

## 2023-08-09 DIAGNOSIS — I73.9 PAD (PERIPHERAL ARTERY DISEASE) (HCC): ICD-10-CM

## 2023-08-09 DIAGNOSIS — J44.9 CHRONIC OBSTRUCTIVE PULMONARY DISEASE, UNSPECIFIED COPD TYPE (HCC): ICD-10-CM

## 2023-08-09 DIAGNOSIS — I10 HYPERTENSION: ICD-10-CM

## 2023-08-09 RX ORDER — FLUTICASONE PROPIONATE AND SALMETEROL XINAFOATE 115; 21 UG/1; UG/1
2 AEROSOL, METERED RESPIRATORY (INHALATION) 2 TIMES DAILY
Qty: 12 G | Refills: 1 | Status: SHIPPED | OUTPATIENT
Start: 2023-08-09

## 2023-08-09 RX ORDER — PRAZOSIN HYDROCHLORIDE 1 MG/1
1 CAPSULE ORAL
Qty: 90 CAPSULE | Refills: 3 | Status: SHIPPED | OUTPATIENT
Start: 2023-08-09

## 2023-08-09 RX ORDER — ALBUTEROL SULFATE 90 UG/1
2 AEROSOL, METERED RESPIRATORY (INHALATION) EVERY 4 HOURS PRN
Qty: 8.5 G | Refills: 5 | Status: SHIPPED | OUTPATIENT
Start: 2023-08-09

## 2023-08-09 RX ORDER — ATORVASTATIN CALCIUM 40 MG/1
40 TABLET, FILM COATED ORAL
Qty: 90 TABLET | Refills: 3 | Status: SHIPPED | OUTPATIENT
Start: 2023-08-09

## 2023-08-09 RX ORDER — ASPIRIN 81 MG/1
81 TABLET, CHEWABLE ORAL DAILY
Qty: 30 TABLET | Refills: 2 | Status: SHIPPED | OUTPATIENT
Start: 2023-08-09

## 2023-08-09 RX ORDER — LISINOPRIL 20 MG/1
20 TABLET ORAL DAILY
Qty: 90 TABLET | Refills: 3 | Status: SHIPPED | OUTPATIENT
Start: 2023-08-09

## 2023-08-09 NOTE — TELEPHONE ENCOUNTER
[id number] called in to notify us that  the Patient medication refill was never received due to power outage and loss of Data in their system and would like for us to send new prescription thank you

## 2023-08-10 ENCOUNTER — APPOINTMENT (EMERGENCY)
Dept: VASCULAR ULTRASOUND | Facility: HOSPITAL | Age: 38
End: 2023-08-10
Payer: MEDICARE

## 2023-08-10 ENCOUNTER — NURSE TRIAGE (OUTPATIENT)
Dept: OTHER | Facility: OTHER | Age: 38
End: 2023-08-10

## 2023-08-10 ENCOUNTER — HOSPITAL ENCOUNTER (EMERGENCY)
Facility: HOSPITAL | Age: 38
Discharge: HOME | End: 2023-08-10
Attending: EMERGENCY MEDICINE
Payer: MEDICARE

## 2023-08-10 VITALS
OXYGEN SATURATION: 99 % | SYSTOLIC BLOOD PRESSURE: 151 MMHG | RESPIRATION RATE: 18 BRPM | DIASTOLIC BLOOD PRESSURE: 86 MMHG | TEMPERATURE: 98.4 F | HEART RATE: 86 BPM

## 2023-08-10 DIAGNOSIS — M79.652 LEFT THIGH PAIN: Primary | ICD-10-CM

## 2023-08-10 DIAGNOSIS — Z89.612 S/P AKA (ABOVE KNEE AMPUTATION) UNILATERAL, LEFT (HCC): ICD-10-CM

## 2023-08-10 DIAGNOSIS — I73.9 PAD (PERIPHERAL ARTERY DISEASE) (HCC): ICD-10-CM

## 2023-08-10 PROCEDURE — 93971 EXTREMITY STUDY: CPT

## 2023-08-10 RX ORDER — ACETAMINOPHEN 325 MG/1
650 TABLET ORAL ONCE
Status: DISCONTINUED | OUTPATIENT
Start: 2023-08-10 | End: 2023-08-10 | Stop reason: HOSPADM

## 2023-08-10 RX ORDER — HYDROMORPHONE HYDROCHLORIDE 2 MG/1
2 TABLET ORAL ONCE
Status: COMPLETED | OUTPATIENT
Start: 2023-08-10 | End: 2023-08-10

## 2023-08-10 RX ORDER — HYDROMORPHONE HYDROCHLORIDE 2 MG/1
2 TABLET ORAL EVERY 6 HOURS PRN
Qty: 20 TABLET | Refills: 0 | Status: SHIPPED | OUTPATIENT
Start: 2023-08-10 | End: 2023-08-15

## 2023-08-10 RX ORDER — HYDROMORPHONE HYDROCHLORIDE 2 MG/ML
2 INJECTION, SOLUTION INTRAMUSCULAR; INTRAVENOUS; SUBCUTANEOUS ONCE
Status: DISCONTINUED | OUTPATIENT
Start: 2023-08-10 | End: 2023-08-10

## 2023-08-10 RX ORDER — IBUPROFEN 600 MG/1
600 TABLET ORAL ONCE
Status: DISCONTINUED | OUTPATIENT
Start: 2023-08-10 | End: 2023-08-10 | Stop reason: HOSPADM

## 2023-08-10 RX ORDER — ONDANSETRON 4 MG/1
4 TABLET, ORALLY DISINTEGRATING ORAL ONCE
Status: COMPLETED | OUTPATIENT
Start: 2023-08-10 | End: 2023-08-10

## 2023-08-10 RX ADMIN — ONDANSETRON 4 MG: 4 TABLET, ORALLY DISINTEGRATING ORAL at 05:43

## 2023-08-10 RX ADMIN — RIVAROXABAN 20 MG: 20 TABLET, FILM COATED ORAL at 05:39

## 2023-08-10 RX ADMIN — HYDROMORPHONE HYDROCHLORIDE 2 MG: 2 TABLET ORAL at 05:39

## 2023-08-10 NOTE — TELEPHONE ENCOUNTER
Reason for Disposition  • [1] SEVERE headache (e.g., excruciating) AND [2] not improved after 2 hours of pain medicine    Answer Assessment - Initial Assessment Questions  1. LOCATION: "Where does it hurt?"       "My whole head"    2. ONSET: "When did the headache start?" (Minutes, hours or days)       12:30am    3. PATTERN: "Does the pain come and go, or has it been constant since it started?"      Constant    4. SEVERITY: "How bad is the pain?" and "What does it keep you from doing?"  (e.g., Scale 1-10; mild, moderate, or severe)    - MILD (1-3): doesn't interfere with normal activities     - MODERATE (4-7): interferes with normal activities or awakens from sleep     - SEVERE (8-10): excruciating pain, unable to do any normal activities         Severe    5. HEAD INJURY: "Has there been any recent injury to the head?"       Denies     6. OTHER SYMPTOMS: "Do you have any other symptoms?" (fever, stiff neck, eye pain, sore throat, cold symptoms)      Severe pain in left stump    Took Tylenol without relief. Protocol disposition discussed with patient (see healthcare provider within 4 hours). Patient will be going to [facility] ER shortly.     Protocols used: HEADACHE-ADULT-

## 2023-08-10 NOTE — TELEPHONE ENCOUNTER
Patient called in very upset because she has not received her Xarelto 20 mg has not been filled she is now in the Er because she has been without it and she is scared of how she is feeling   Please review Thank you

## 2023-08-10 NOTE — ED PROVIDER NOTES
History  Chief Complaint   Patient presents with   • Pain     Patient states her stump hurts, has not taken per prescribed xarelto. Took 2 ASA last night and then took 4 more due to headache around 0040 this morning. Amputation ATK L leg. Patient is a 29-year-old female with PMH of HTN, T2IDDM, asthma/COPD, bipolar 1 disorder, schizoaffective disorder, and PSH of left above-the-knee amputation presenting for evaluation of acute left leg pain. The patient has started yesterday evening at approximately 10 PM with left thigh pain that radiates to the left groin, described as stiffness, sharp/shooting, and unrelieved with Tylenol. Review of the patient's chart indicates she was recently seen for complaint of left leg pain after fall. She denies any further trauma or falls. She denies fevers, chills, redness/warmth/swelling to the left leg. She notes running out of her Xarelto and taking last dose on Monday morning (3 days PTA). She notes calling her doctor that manages her Xarelto but was unable to get prescription filled. History provided by:  Patient   used: No        Prior to Admission Medications   Prescriptions Last Dose Informant Patient Reported? Taking? Advair -21 MCG/ACT inhaler   No No   Sig: Inhale 2 puffs 2 (two) times a day Rinse mouth after use   Alcohol Swabs (Pharmacist Choice Alcohol) PADS  Outside Facility (Specify), Self No No   Sig: USE 3-4 TIMES AS DIRECTED.    Blood Pressure Monitoring (Blood Pressure Monitor Automat) [address] (Specify), Self No No   Sig: Use Daily as Directed   Comfort EZ Pen Needles 33G X 4 MM MISC  Outside Facility (Specify), Self No No   Sig: USE AS DIRECTED   DULoxetine (CYMBALTA) 20 mg capsule   No No   Sig: Take 2 capsules (40 mg total) by mouth daily   Diclofenac Sodium (VOLTAREN) 1 %   No No   Sig: Apply 2 g topically 4 (four) times a day For pain to affected area   Global Inject Ease Lancets 30G MISC  Outside Facility (Specify), Self No No   Sig: USE 3 (THREE) TIMES A DAY   HYDROmorphone (DILAUDID) 2 mg tablet  Outside Facility (Specify), Self No No   Sig: Take 1 (2mg) to 2 tabs (4mg) by mouth every 4 hours as needed for moderate to severe pain   Patient not taking: Reported on 6/27/2023   Insulin Pen Needle (Comfort EZ Pen Needles) 32G X 4 MM MISC  Outside Facility (Specify), Self No No   Sig: Inject under the skin 4 (four) times a day (before meals and at bedtime)   Lancet Devices (Lancing Device) MISC   No No   Sig: Place on the skin if needed (use as directed w/ glucometer to check blood sugar) Use as directed   QUEtiapine (SEROquel) 200 mg tablet  Outside Facility (Specify), Self Yes No   Sig: Take by mouth daily at bedtime   Trulicity 1.5 NW/0.4LU injection  Outside Facility (Specify), Self Yes No   Sig: As directed   albuterol (PROVENTIL HFA,VENTOLIN HFA) 90 mcg/act inhaler   No No   Sig: Inhale 2 puffs every 4 (four) hours as needed for wheezing or shortness of breath   aspirin 81 mg chewable tablet   No No   Sig: Chew 1 tablet (81 mg total) daily   atorvastatin (LIPITOR) 40 mg tablet   No No   Sig: Take 1 tablet (40 mg total) by mouth daily with dinner   bisacodyl (DULCOLAX) 10 mg suppository  Outside Facility (Specify), Self No No   Sig: Insert 1 suppository (10 mg total) into the rectum daily as needed for constipation   Patient not taking: Reported on 6/27/2023   gabapentin (NEURONTIN) 300 mg capsule  Outside Facility (Specify), Self No No   Sig: Take 1 capsule (300 mg total) by mouth 3 (three) times a day   hydrOXYzine HCL (ATARAX) 50 mg tablet  Outside Facility (Specify), Self No No   Sig: Take 1 tablet (50 mg total) by mouth every 8 (eight) hours as needed for itching or anxiety   insulin glargine (LANTUS) 100 units/mL subcutaneous injection  Outside Facility (Specify), Self No No   Sig: Inject 45 Units under the skin daily at bedtime   insulin lispro (HumaLOG) 100 units/mL injection  Outside Facility (Specify), Self No No   Sig: Inject 2-12 Units under the skin 3 (three) times a day before meals   insulin lispro (HumaLOG) 100 units/mL injection  Outside Facility (Specify), Self No No   Sig: Inject 2-12 Units under the skin daily at bedtime   insulin lispro (HumaLOG) 100 units/mL injection  Outside Facility (Specify), Self No No   Sig: Inject 12 Units under the skin daily with dinner   insulin lispro (HumaLOG) 100 units/mL injection  Outside Facility (Specify), Self No No   Sig: Inject 20 Units under the skin daily with lunch   insulin lispro (HumaLOG) 100 units/mL injection  Outside Facility (Specify), Self No No   Sig: Inject 20 Units under the skin daily with breakfast Do not start before April 20, 2023.    lidocaine (LIDODERM) 5 %   No No   Sig: Apply 1 patch topically over 12 hours every 24 hours Remove & Discard patch within 12 hours or as directed by MD   lisinopril (ZESTRIL) 20 mg tablet   No No   Sig: Take 1 tablet (20 mg total) by mouth daily   melatonin 3 mg   No No   Sig: Take 2 tablets (6 mg total) by mouth daily at bedtime   metFORMIN (GLUCOPHAGE) 1000 MG tablet  Outside Facility (Specify), Self No No   Sig: TAKE ONE (1) TABLET BY MOUTH TWICE DAILY WITH FOOD   methocarbamol (ROBAXIN) 750 mg tablet  Outside Facility (Specify), Self No No   Sig: Take 1 tablet (750 mg total) by mouth every 6 (six) hours as needed for muscle spasms   nicotine (NICODERM CQ) 21 mg/24 hr TD 24 hr patch   No No   Sig: Place 1 patch on the skin over 24 hours daily   nicotine polacrilex (NICORETTE) 2 mg gum   No No   Sig: Chew 1 each (2 mg total) as needed for smoking cessation   polyethylene glycol (MIRALAX) 17 g packet  Outside Facility (Specify), Self No No   Sig: Take 17 g by mouth daily   prazosin (MINIPRESS) 1 mg capsule   No No   Sig: Take 1 capsule (1 mg total) by mouth daily at bedtime   rivaroxaban (XARELTO) 15 mg tablet  Outside Facility (Specify) No No   Sig: Take 1 tablet (15 mg total) by mouth 2 (two) times a day with meals for 3 doses   Patient not taking: Reported on 2023   rivaroxaban (XARELTO) 20 mg tablet  Outside Facility (Specify), Self No No   Sig: Take 1 tablet (20 mg total) by mouth daily with breakfast Do not start before 2023. senna-docusate sodium (SENOKOT S) 8.6-50 mg per tablet  Outside Facility (Specify), Self No No   Sig: Take 2 tablets by mouth 2 (two) times a day   simethicone (MYLICON) 80 mg chewable tablet  Outside Facility (Specify), Self No No   Sig: Chew 1 tablet (80 mg total) every 6 (six) hours as needed for flatulence   topiramate (TOPAMAX) 25 mg tablet  Outside Facility (Specify), Self No No   Sig: Take 1 tablet (25 mg total) by mouth 2 (two) times a day   white petrolatum-mineral oil (EUCERIN,HYDROCERIN) cream  Outside Facility (Specify), Self No No   Sig: Apply topically 3 (three) times a day as needed (dry skin)   Patient not taking: Reported on 2023      Facility-Administered Medications: None       Past Medical History:   Diagnosis Date   • Asthma    • Atherosclerosis    • Bipolar 1 disorder (HCC)    • COPD (chronic obstructive pulmonary disease) (HCC)    • Depression    • Diabetes mellitus (HCC)    • Hypertension    • Psychiatric disorder     cutting history   • PTSD (post-traumatic stress disorder)    • Schizoaffective disorder (HCC)    • Tendonitis        Past Surgical History:   Procedure Laterality Date   • AMPUTATION ABOVE KNEE (AKA) Left 3/29/2023    Procedure: AMPUTATION ABOVE KNEE (AKA); Surgeon: Loni Benito MD;  Location: [facility];  Service: Vascular   • BYPASS FEMORAL-POPLITEAL Left 2021    Procedure: Left Common Femoral Below Knee to Popliteal Bypass with Insitu GSV graft.   Left Lower Extremity Angiogram;  Surgeon: Loni Benito MD;  Location: [facility];  Service: Vascular   •  SECTION     • EAR SURGERY     • IR LOWER EXTREMITY ANGIOGRAM  2021   • IR LOWER EXTREMITY ANGIOGRAM  2021   • OK SLCTV CATHJ 3RD+ ORD SLCTV ABDL PEL/LXTR [facility] Left 3/24/2023    Procedure: Left leg angiogram;  Surgeon: Casey Morel DO;  Location: [facility];  Service: Vascular   • TUBAL LIGATION         Family History   Problem Relation Age of Onset   • Hypertension Mother    • Diabetes Mother    • HIV Mother    • Heart disease Mother    • No Known Problems Father      I have reviewed and agree with the history as documented. E-Cigarette/Vaping   • E-Cigarette Use Never User      E-Cigarette/Vaping Substances   • Nicotine No    • THC No    • CBD No    • Flavoring No    • Other No    • Unknown No      Social History     Tobacco Use   • Smoking status: Former     Packs/day: 1.00     Years: 20.00     Total pack years: 20.00     Types: Cigarettes     Quit date: 3/24/2023     Years since quittin.3   • Smokeless tobacco: Former     Quit date: 2022   Vaping Use   • Vaping Use: Never used   Substance Use Topics   • Alcohol use: Not Currently     Comment: not currently   • Drug use: Not Currently     Types: Cocaine, Marijuana, "Crack" cocaine     Comment: 1 years clean from crack cocaine since        Review of Systems   Constitutional: Negative for chills and fever. Gastrointestinal: Positive for nausea. Negative for abdominal pain, constipation, diarrhea and vomiting. Genitourinary: Negative. Musculoskeletal: Negative for arthralgias, back pain and joint swelling. Left stump pain since 10 PM last night   Skin: Negative for color change, pallor, rash and wound. Neurological: Negative for dizziness, syncope, weakness, light-headedness, numbness and headaches. All other systems reviewed and are negative. Physical Exam  Physical Exam  Vitals and nursing note reviewed. Constitutional:       General: She is awake. She is not in acute distress. Appearance: Normal appearance. She is well-developed. She is morbidly obese. She is not ill-appearing, toxic-appearing or diaphoretic. HENT:      Head: Normocephalic and atraumatic.       Jaw: There is normal jaw occlusion. Nose: Nose normal.      Mouth/Throat:      Lips: Pink. No lesions. Mouth: Mucous membranes are moist.   Eyes:      General: Lids are normal. Vision grossly intact. Gaze aligned appropriately. Extraocular Movements: Extraocular movements intact. Conjunctiva/sclera: Conjunctivae normal.   Neck:      Trachea: Phonation normal. No abnormal tracheal secretions. Cardiovascular:      Rate and Rhythm: Tachycardia present. Pulses:           Radial pulses are 2+ on the right side and 2+ on the left side. Heart sounds: Normal heart sounds, S1 normal and S2 normal. No murmur heard. Pulmonary:      Effort: Pulmonary effort is normal. No tachypnea or respiratory distress. Breath sounds: Normal breath sounds and air entry. No stridor, decreased air movement or transmitted upper airway sounds. No decreased breath sounds. Abdominal:      Palpations: Abdomen is soft. Tenderness: There is no abdominal tenderness. There is no guarding or rebound. Musculoskeletal:         General: Normal range of motion. Cervical back: Normal range of motion and neck supple. Left Lower Extremity: Left leg is amputated above knee. (Left stump is warm, without swelling or erythema or edema. Old incision site remains well-healed.)  Skin:     General: Skin is warm and dry. Capillary Refill: Capillary refill takes less than 2 seconds. Findings: No rash or wound. Neurological:      General: No focal deficit present. Mental Status: She is alert and oriented to person, place, and time. Mental status is at baseline. GCS: GCS eye subscore is 4. GCS verbal subscore is 5. GCS motor subscore is 6. Psychiatric:         Mood and Affect: Mood is anxious. Behavior: Behavior is cooperative.          Vital Signs  ED Triage Vitals   Temperature Pulse Respirations Blood Pressure SpO2   08/10/23 0513 08/10/23 0500 08/10/23 0500 08/10/23 0500 08/10/23 0500 98.4 °F (36.9 °C) (!) 110 22 151/86 99 %      Temp Source Heart Rate Source Patient Position - Orthostatic VS BP Location FiO2 (%)   08/10/23 0513 08/10/23 0535 08/10/23 0500 08/10/23 0500 --   Oral Monitor Lying Right arm       Pain Score       08/10/23 0539       8           Vitals:    08/10/23 0500 08/10/23 0535   BP: 151/86    Pulse: (!) 110 86   Patient Position - Orthostatic VS: Lying          Visual Acuity      ED Medications  Medications   rivaroxaban (XARELTO) tablet 20 mg (20 mg Oral Given 8/10/23 0539)   ondansetron (ZOFRAN-ODT) dispersible tablet 4 mg (4 mg Oral Given 8/10/23 0543)   HYDROmorphone (DILAUDID) tablet 2 mg (2 mg Oral Given 8/10/23 0539)       Diagnostic Studies  Results Reviewed     None                 VAS lower limb venous duplex study, unilateral/limited   ED Interpretation by Laurita Cleary, [address] [place] [address] (08/10 7210)   Negative for DVT after discussion with vascular tech. Procedures  Procedures         ED Course  ED Course as of 08/10/23 0758   Thu Aug 10, 2023   0503 Triage vital signs with elevated heart rate of 110, respiratory rate of 22, otherwise within normal limits and stable                               SBIRT 20yo+    Flowsheet Row Most Recent Value   Initial Alcohol Screen: US AUDIT-C     1. How often do you have a drink containing alcohol? 0 Filed at: 08/10/2023 0504   2. How many drinks containing alcohol do you have on a typical day you are drinking? 0 Filed at: 08/10/2023 0504   3a. Male UNDER 65: How often do you have five or more drinks on one occasion? 0 Filed at: 08/10/2023 0504   3b. FEMALE Any Age, or MALE 65+: How often do you have 4 or more drinks on one occassion? 0 Filed at: 08/10/2023 0504   Audit-C Score 0 Filed at: 08/10/2023 7152   CRISTÓBAL: How many times in the past year have you. .. Used an illegal drug or used a prescription medication for non-medical reasons?  Never Filed at: 08/10/2023 3591                    Medical Decision Making  DDx including but not limited to: Phantom limb pain, radiculopathy, DVT; considered but doubt arterial occlusion    #Left stump pain  Patient with acute pain to left stump. She notes she has had pain since her operation, however tonight it is acutely worse. She has no more Dilaudid available from last ER visit and tried 650 mg of Tylenol at first and then 2 hours later 1300 mg without relief of her pain. She denies infectious symptoms. Her exam is with a warm, well-appearing stump. No duskiness to suggest arterial occlusion. No overlying skin changes to suggest cellulitis. Vital signs initially notable for tachycardia, however patient was acutely anxious and endorsing severe pain. After talking to her and reassuring her, heart rate promptly trended and remained at 80s while resting. Patient has been off of Xarelto for the last 3 days. Will obtain left lower limb duplex to further evaluate for DVT. Doubt arterial compromise given benign and stable exam.  Patient denies other symptoms. Labs reviewed from visit to the ER 7/29/2023. Given patient is otherwise well-appearing and remainder of physical exam is benign, will hold off on blood work. DVT study negative. Patient with improvement in pain. Reassessment indicates a healthy appearing stump. Patient has no further complaints. Will give 1 more prescription for Dilaudid for severe pain and emphasized that this is the last time any opioid prescriptions for this left thigh pain will come from the ER. It is very important that she follows up with primary care and even pain management if necessary. Patient to maximize use of comfort measures, Tylenol, ibuprofen, prior to using Dilaudid. She is in agreement with plan and demonstrates understanding by teach back method. Left thigh pain: acute illness or injury  S/P AKA (above knee amputation) unilateral, left ([address]): chronic illness or injury  Risk  OTC drugs.   Prescription drug management. Disposition  Final diagnoses:   Left thigh pain   S/P AKA (above knee amputation) unilateral, left ([address] [facility])     Time reflects when diagnosis was documented in both MDM as applicable and the Disposition within this note     Time User Action Codes Description Comment    8/10/2023  7:45 AM Caitlin Hernandez Add [E69.148] Left thigh pain     8/10/2023  7:45 AM Damaris Wilder Add [Y20.386] S/P AKA (above knee amputation) unilateral, left [facility])       ED Disposition     ED Disposition   Discharge    Condition   Stable    Date/Time   Thu Aug 10, 2023  7:45 AM    Comment   Smith Curl discharge to home/self care. Follow-up Information     Follow up With Specialties Details Why Contact Info Additional Information    [facility] Call today  [address] [address]  [phone] GG BYJX'M [address] [facility],[address], [address], [address], [facility] Call today  [address] [address]           Patient's Medications   Discharge Prescriptions    HYDROMORPHONE (DILAUDID) 2 MG TABLET    Take 1 tablet (2 mg total) by mouth every 6 (six) hours as needed for moderate pain or severe pain for up to 5 days Max Daily Amount: 8 mg       Start Date: 8/10/2023 End Date: 8/15/2023       Order Dose: 2 mg       Quantity: 20 tablet    Refills: 0       No discharge procedures on file.     PDMP Review       Value Time User    PDMP Reviewed  Yes 8/10/2023  5:06 AM Caitlin Hernandez [address] [place] [address]          ED Provider  Electronically Signed by           EMILE Bolden  08/10/23 [facility], [address] [place] [address]  08/10/23 5894

## 2023-08-10 NOTE — DISCHARGE INSTRUCTIONS
Please follow-up with your primary care provider for further pain management and reevaluation. Use 650 mg of acetaminophen (Tylenol) every 4 hours as needed for mild to moderate pain. Use 600 mg of ibuprofen (Motrin) every 6 hours as needed for mild to moderate pain. Use the prescribed hydromorphone (Dilaudid) only as needed for severe pain. Return to the ER if you develop fever, redness or warmth or swelling of the left lower leg, difficulty breathing, vomiting, weakness, confusion, or lethargy.

## 2023-08-10 NOTE — TELEPHONE ENCOUNTER
This is managed by vascular surgery not by our office, it looks like someone approved it this morning? She would need to follow up with vascular surgery for the script.

## 2023-08-10 NOTE — ED NOTES
Patient provided menu, portable telephone and instructions on how to order meal.     Gutierrez Acosta RN  08/10/23 1436

## 2023-08-11 PROCEDURE — 93971 EXTREMITY STUDY: CPT | Performed by: SURGERY

## 2023-08-15 RX ORDER — BLOOD SUGAR DIAGNOSTIC
STRIP MISCELLANEOUS
Status: ON HOLD | COMMUNITY
Start: 2023-07-27

## 2023-08-15 RX ORDER — BLOOD-GLUCOSE METER
EACH MISCELLANEOUS
Status: ON HOLD | COMMUNITY
Start: 2023-07-24 | End: 2023-08-24

## 2023-08-16 ENCOUNTER — OFFICE VISIT (OUTPATIENT)
Dept: CARDIOLOGY CLINIC | Facility: CLINIC | Age: 38
End: 2023-08-16
Payer: MEDICARE

## 2023-08-16 ENCOUNTER — TELEPHONE (OUTPATIENT)
Dept: FAMILY MEDICINE CLINIC | Facility: CLINIC | Age: 38
End: 2023-08-16

## 2023-08-16 VITALS
SYSTOLIC BLOOD PRESSURE: 130 MMHG | DIASTOLIC BLOOD PRESSURE: 74 MMHG | OXYGEN SATURATION: 98 % | HEART RATE: 117 BPM | WEIGHT: 293 LBS | HEIGHT: 61 IN | BODY MASS INDEX: 55.32 KG/M2

## 2023-08-16 DIAGNOSIS — I10 ESSENTIAL HYPERTENSION: ICD-10-CM

## 2023-08-16 DIAGNOSIS — R00.2 PALPITATIONS: ICD-10-CM

## 2023-08-16 DIAGNOSIS — I73.9 PAD (PERIPHERAL ARTERY DISEASE) (HCC): Primary | ICD-10-CM

## 2023-08-16 PROCEDURE — 99214 OFFICE O/P EST MOD 30 MIN: CPT | Performed by: INTERNAL MEDICINE

## 2023-08-16 RX ORDER — INSULIN GLARGINE 100 [IU]/ML
INJECTION, SOLUTION SUBCUTANEOUS
Status: ON HOLD | COMMUNITY
Start: 2023-07-07 | End: 2023-08-24

## 2023-08-16 NOTE — PROGRESS NOTES
Cardiology Follow Up    Gracie Lozoya  1985  [id number]  [facility] [place]  [address]  [id number] [phone]    1. PAD (peripheral artery disease)  AMB extended holter monitor    Echo complete w/ contrast if indicated      2. Essential hypertension        3. Palpitations  AMB extended holter monitor    Echo complete w/ contrast if indicated          Discussion/Summary:    Peripheral arterial disease. Previously with stenting which failed. She underwent bypass but then had progression and is now status post left AKA. Prior to vascular bypass surgery, she had a low risk or artifact on pharmacologic nuclear stress test. She has noncardiac chest pain. Her complaint is of palpitations. I will check a 1 week Zio patch and an echocardiogram. Very likely we will start her on a beta-blocker as she is pretty tachycardic in the office, but I like to get some objective data before I make this change. Currently on lisinopril and prazosin for hypertension. Xarelto for anticoagulation given her previous concern for embolic disease. 40 mg of atorvastatin. She is back to smoking. She has had the nicotine patches are not covered by her insurance anymore. Encouraged her to find other ways to quit smoking. Transportation issues. She required a lift ride to get to the office today. Can do a virtual visit in 1 month after the monitor and echo were done. History of Present Illness:   50-year-old female. She has a history of peripheral arterial disease. She had lower extremity peripheral intervention which failed, likely secondary to medication noncompliance. She ultimately had required bypass. I had seen her in preoperative evaluation for that. She had multiple complaints including pain, shortness of breath, chest discomfort.     We did a pharmacologic nuclear stress test which did show a small reversible defect of the  Basal to mid anterior wall, but EF was normal, echo was unremarkable. Given overall area, I was more suspicious for artifact. We proceeded with medical management and she underwent her surgery without complication. Lost to follow up with cardiology after 2021. She is returning to the office today August 16, 2023 now having undergone a left AKA. She was seen in preoperative evaluation in March 2023 at the time of her surgery. No changes were made to her regimen. She was significantly hypertensive when she initially presented to the hospital, but this has been under better control. She recently ran out of some of her Xarelto, but she now has this. She has been fitted with a prosthetic leg. She says she just received this yesterday she can be working with physical therapy for her. Her complaints are of palpitations which are almost daily. They come pretty frequently and they can last for about 30 minutes at a time. She says she feels a sensation of anxiety but does not actually feel nervous or anxious at the time. Also reports that her heart rate tends to run consistently high she is tachycardic in the office. She says she "always "has chest pain. This is sharp, no different than when she had her nuclear stress test a few years back.     Unfortunately, she is also back to smoking      Medical Problems     Problem List     Headache    Essential hypertension    Type 2 diabetes mellitus with diabetic polyneuropathy, with long-term current use of insulin ([address])      Lab Results   Component Value Date    HGBA1C 9.8 (A) 12/20/2022         Paresthesia    COPD (chronic obstructive pulmonary disease) ([facility])    Nicotine dependence    Bipolar disorder ([address])    Cellulitis of left lower extremity    Bilateral leg pain    PVD (peripheral vascular disease) ([facility])    Body mass index (BMI)40.0-44.9, adult ([address])    Bursitis of left knee    Superficial femoral artery occlusion ([facility])    Acute pain of left knee Atherosclerosis of left leg (HCC)              Past Medical History:   Diagnosis Date   • Asthma    • Atherosclerosis    • Bipolar 1 disorder (HCC)    • COPD (chronic obstructive pulmonary disease) (HCC)    • Depression    • Diabetes mellitus ([address] [facility])    • Hypertension    • Psychiatric disorder     cutting history   • PTSD (post-traumatic stress disorder)    • Schizoaffective disorder ([address])    • Tendonitis      Social History     Tobacco Use   • Smoking status: Former     Packs/day: 1.00     Years: 20.00     Total pack years: 20.00     Types: Cigarettes     Quit date: 3/24/2023     Years since quittin.3   • Smokeless tobacco: Former     Quit date: 2022   Vaping Use   • Vaping Use: Never used   Substance Use Topics   • Alcohol use: Not Currently     Comment: not currently   • Drug use: Not Currently     Types: Cocaine, Marijuana, "Crack" cocaine     Comment: 1 years clean from crack cocaine since       Family History   Problem Relation Age of Onset   • Hypertension Mother    • Diabetes Mother    • HIV Mother    • Heart disease Mother    • No Known Problems Father      Past Surgical History:   Procedure Laterality Date   • AMPUTATION ABOVE KNEE (AKA) Left 3/29/2023    Procedure: AMPUTATION ABOVE KNEE (AKA); Surgeon: Zohreh Harvey MD;  Location: [facility];  Service: Vascular   • BYPASS FEMORAL-POPLITEAL Left 2021    Procedure: Left Common Femoral Below Knee to Popliteal Bypass with Insitu GSV graft.   Left Lower Extremity Angiogram;  Surgeon: Zohreh Harvey MD;  Location: [facility];  Service: Vascular   •  SECTION     • EAR SURGERY     • IR LOWER EXTREMITY ANGIOGRAM  2021   • IR LOWER EXTREMITY ANGIOGRAM  2021   • IA SLCTV CATHJ 3RD+ ORD SLCTV ABDL PEL/LXTR [facility] Left 3/24/2023    Procedure: Left leg angiogram;  Surgeon: Madelaine Barger DO;  Location: [facility];  Service: Vascular   • TUBAL LIGATION         Current Outpatient Medications:   •  Advair -21 MCG/ACT inhaler, Inhale 2 puffs 2 (two) times a day Rinse mouth after use, Disp: 12 g, Rfl: 1  •  albuterol (PROVENTIL HFA,VENTOLIN HFA) 90 mcg/act inhaler, Inhale 2 puffs every 4 (four) hours as needed for wheezing or shortness of breath, Disp: 8.5 g, Rfl: 5  •  Alcohol Swabs (Pharmacist Choice Alcohol) PADS, USE 3-4 TIMES AS DIRECTED., Disp: 100 each, Rfl: 3  •  aspirin 81 mg chewable tablet, Chew 1 tablet (81 mg total) daily, Disp: 30 tablet, Rfl: 2  •  atorvastatin (LIPITOR) 40 mg tablet, Take 1 tablet (40 mg total) by mouth daily with dinner, Disp: 90 tablet, Rfl: 3  •  Blood Glucose Monitoring Suppl (ONE TOUCH ULTRA 2) w/Device KIT, BY DEVICE ROUTE 2 (TWO) TIMES A DAY CHECK BLOOD SUGARS AND RECORD VALUE AND TIME OF DAY TWICE A DAY, Disp: , Rfl:   •  Blood Pressure Monitoring (Blood Pressure Monitor Automat) KATLYN, Use Daily as Directed, Disp: 1 Device, Rfl: 0  •  Comfort EZ Pen Needles 33G X 4 MM MISC, USE AS DIRECTED, Disp: 300 each, Rfl: 0  •  Diclofenac Sodium (VOLTAREN) 1 %, Apply 2 g topically 4 (four) times a day For pain to affected area, Disp: 100 g, Rfl: 0  •  DULoxetine (CYMBALTA) 20 mg capsule, Take 2 capsules (40 mg total) by mouth daily, Disp: 60 capsule, Rfl: 2  •  gabapentin (NEURONTIN) 300 mg capsule, Take 1 capsule (300 mg total) by mouth 3 (three) times a day, Disp: , Rfl: 0  •  Global Inject Ease Lancets 30G MISC, USE 3 (THREE) TIMES A DAY, Disp: 100 each, Rfl: 0  •  HYDROmorphone (DILAUDID) 2 mg tablet, Take 1 (2mg) to 2 tabs (4mg) by mouth every 4 hours as needed for moderate to severe pain, Disp: 12 tablet, Rfl: 0  •  hydrOXYzine HCL (ATARAX) 50 mg tablet, Take 1 tablet (50 mg total) by mouth every 8 (eight) hours as needed for itching or anxiety, Disp: 30 tablet, Rfl: 0  •  insulin glargine (LANTUS) 100 units/mL subcutaneous injection, Inject 45 Units under the skin daily at bedtime, Disp: 10 mL, Rfl: 0  •  Insulin Glargine Solostar 100 UNIT/ML SOPN, INJECT 45 UNITS SUBCUTANEOULY ONE TIME A DAY, Disp: , Rfl:   • Insulin Pen Needle (Comfort EZ Pen Needles) 32G X 4 MM MISC, Inject under the skin 4 (four) times a day (before meals and at bedtime), Disp: 120 each, Rfl: 5  •  Lancet Devices (Lancing Device) MISC, Place on the skin if needed (use as directed w/ glucometer to check blood sugar) Use as directed, Disp: 1 each, Rfl: 0  •  lisinopril (ZESTRIL) 20 mg tablet, Take 1 tablet (20 mg total) by mouth daily, Disp: 90 tablet, Rfl: 3  •  metFORMIN (GLUCOPHAGE) 1000 MG tablet, TAKE ONE (1) TABLET BY MOUTH TWICE DAILY WITH FOOD, Disp: 60 tablet, Rfl: 0  •  OneTouch Ultra test strip, TEST TWICE DAILY (), Disp: , Rfl:   •  prazosin (MINIPRESS) 1 mg capsule, Take 1 capsule (1 mg total) by mouth daily at bedtime, Disp: 90 capsule, Rfl: 3  •  QUEtiapine (SEROquel) 200 mg tablet, Take by mouth daily at bedtime, Disp: , Rfl:   •  rivaroxaban (XARELTO) 20 mg tablet, Take 1 tablet (20 mg total) by mouth daily with breakfast, Disp: 30 tablet, Rfl: 0  •  topiramate (TOPAMAX) 25 mg tablet, Take 1 tablet (25 mg total) by mouth 2 (two) times a day, Disp: , Rfl: 0  •  Trulicity 1.5 VT/2.3VE injection, As directed, Disp: , Rfl:   •  bisacodyl (DULCOLAX) 10 mg suppository, Insert 1 suppository (10 mg total) into the rectum daily as needed for constipation (Patient not taking: Reported on 6/27/2023), Disp: 12 suppository, Rfl: 0  •  insulin lispro (HumaLOG) 100 units/mL injection, Inject 2-12 Units under the skin 3 (three) times a day before meals (Patient not taking: Reported on 8/16/2023), Disp: , Rfl: 0  •  insulin lispro (HumaLOG) 100 units/mL injection, Inject 2-12 Units under the skin daily at bedtime (Patient not taking: Reported on 8/16/2023), Disp: , Rfl: 0  •  insulin lispro (HumaLOG) 100 units/mL injection, Inject 12 Units under the skin daily with dinner (Patient not taking: Reported on 8/16/2023), Disp: , Rfl: 0  •  insulin lispro (HumaLOG) 100 units/mL injection, Inject 20 Units under the skin daily with lunch (Patient not taking: Reported on 8/16/2023), Disp: , Rfl: 0  •  insulin lispro (HumaLOG) 100 units/mL injection, Inject 20 Units under the skin daily with breakfast Do not start before April 20, 2023., Disp: , Rfl: 0  •  lidocaine (LIDODERM) 5 %, Apply 1 patch topically over 12 hours every 24 hours Remove & Discard patch within 12 hours or as directed by MD (Patient not taking: Reported on 8/16/2023), Disp: 15 patch, Rfl: 1  •  melatonin 3 mg, Take 2 tablets (6 mg total) by mouth daily at bedtime (Patient not taking: Reported on 8/16/2023), Disp: 60 tablet, Rfl: 2  •  methocarbamol (ROBAXIN) 750 mg tablet, Take 1 tablet (750 mg total) by mouth every 6 (six) hours as needed for muscle spasms (Patient not taking: Reported on 8/16/2023), Disp: , Rfl: 0  •  nicotine (NICODERM CQ) 21 mg/24 hr TD 24 hr patch, Place 1 patch on the skin over 24 hours daily (Patient not taking: Reported on 8/16/2023), Disp: 28 patch, Rfl: 3  •  nicotine polacrilex (NICORETTE) 2 mg gum, Chew 1 each (2 mg total) as needed for smoking cessation (Patient not taking: Reported on 8/16/2023), Disp: 100 each, Rfl: 0  •  polyethylene glycol (MIRALAX) 17 g packet, Take 17 g by mouth daily (Patient not taking: Reported on 8/16/2023), Disp: , Rfl: 0  •  senna-docusate sodium (SENOKOT S) 8.6-50 mg per tablet, Take 2 tablets by mouth 2 (two) times a day (Patient not taking: Reported on 8/16/2023), Disp: , Rfl: 0  •  simethicone (MYLICON) 80 mg chewable tablet, Chew 1 tablet (80 mg total) every 6 (six) hours as needed for flatulence (Patient not taking: Reported on 8/16/2023), Disp: 30 tablet, Rfl: 0  •  white petrolatum-mineral oil (EUCERIN,HYDROCERIN) cream, Apply topically 3 (three) times a day as needed (dry skin) (Patient not taking: Reported on 6/27/2023), Disp: , Rfl: 0  No Known Allergies    Vitals:    08/16/23 1210   BP: 130/74   BP Location: Left arm   Patient Position: Sitting   Cuff Size: Standard   Pulse: (!) 117   SpO2: 98%   Weight: 133 kg (294 lb) Height: 5' 1" (1.549 m)     Vitals:    08/16/23 1210   Weight: 133 kg (294 lb)      Height: 5' 1" (154.9 cm)   Body mass index is 55.55 kg/m². Physical Exam:  GEN: Ken Wang appears well, alert and oriented x 3, pleasant and cooperative   HEENT: pupils equal, round, and reactive to light; extraocular muscles intact  NECK: supple, no carotid bruits   HEART: tachycardic, regular  LUNGS: clear to auscultation bilaterally; no wheezes, rales, or rhonchi   ABDOMEN: Obese, normal bowel sounds, soft, no tenderness, no distention  EXTREMITIES: L AKA  SKIN: normal without suspicious lesions on exposed skin    Labs:  Lab Results   Component Value Date    SODIUM 137 07/29/2023    K 4.0 07/29/2023     07/29/2023    CREATININE 0.60 07/29/2023    BUN 15 07/29/2023    CO2 23 07/29/2023    ALT 69 03/30/2023    AST 58 (H) 03/30/2023    INR 1.19 07/29/2023    GLUF 530 (HH) 03/14/2023    HGBA1C 9.8 (A) 12/20/2022    WBC 10.22 (H) 07/29/2023    HGB 11.1 (L) 07/29/2023    HCT 33.5 (L) 07/29/2023     (H) 07/29/2023       No results found for: "CHOL"  Lab Results   Component Value Date    LDLCALC 106 (H) 03/29/2023    LDLCALC 111 (H) 08/07/2020     Lab Results   Component Value Date    HDL 54 03/29/2023    HDL 46 08/07/2020     Lab Results   Component Value Date    TRIG 84 03/29/2023    TRIG 139 08/07/2020       Testing:      Stress 910/21:  SUMMARY:  -  Stress results: There was no chest pain during stress. -  ECG conclusions: The stress ECG was non-diagnostic.  -  Perfusion imaging: There was a small, moderately severe, reversible myocardial perfusion defect of the basal to mid anterior wall. -  Gated SPECT: The calculated left ventricular ejection fraction was 50 %. Left ventricular ejection fraction was within normal limits by visual estimate.  There was no diagnostic evidence for left ventricular regional abnormality.     IMPRESSIONS: Abnormal study after pharmacologic vasodilation without reproduction of symptoms. There was a small amount of ischemia in the distribution of the left anterior descending coronary artery. Left ventricular systolic function was  Normal.    Echo 910/21:  LEFT VENTRICLE:  Systolic function was normal. Ejection fraction was estimated to be 60 %. There were no regional wall motion abnormalities. Wall thickness was mildly increased. There was mild concentric hypertrophy.   Left ventricular diastolic function parameters were normal.     RIGHT VENTRICLE:  The size was normal.  Systolic function was normal.

## 2023-08-16 NOTE — TELEPHONE ENCOUNTER
Patient requires a form to be completed. Patient is aware of 7-10 day turn around time. Please refer to the following information:       Type of Form:     Date of Visit (if applicable):     Doctor:Dr Yousif  Expected date: How patient would like to receive form:    Fax number:   [phone]  Patient phone number:   [phone]      Copy scanned to encounter. Copy provided to patient. Original in (X) team folder to be completed.

## 2023-08-17 ENCOUNTER — TELEPHONE (OUTPATIENT)
Dept: VASCULAR SURGERY | Facility: CLINIC | Age: 38
End: 2023-08-17

## 2023-08-17 ENCOUNTER — TELEPHONE (OUTPATIENT)
Dept: CARDIOLOGY CLINIC | Facility: CLINIC | Age: 38
End: 2023-08-17

## 2023-08-17 ENCOUNTER — TELEPHONE (OUTPATIENT)
Dept: FAMILY MEDICINE CLINIC | Facility: CLINIC | Age: 38
End: 2023-08-17

## 2023-08-17 DIAGNOSIS — Z89.612 S/P AKA (ABOVE KNEE AMPUTATION) UNILATERAL, LEFT (HCC): Primary | ICD-10-CM

## 2023-08-17 NOTE — TELEPHONE ENCOUNTER
Encounter for forms      Please refer to the following information:       Type of Form: [employer]    Date of Visit (if applicable): 6/35/8989    Doctor: Trey Post    Fax number: [phone]    Patient phone number: [phone]     Original in (Dr. Trey Post)  folder to be completed.      There is 2 papers in there for the same patient and the same company

## 2023-08-17 NOTE — TELEPHONE ENCOUNTER
Pt was last in office 6/27/23 w/ Parisa BAPTISTE, please see note for additional details. Per note "Plan  - Cleared for L AKA prosthetic use and weight bearing  - continue xarelto and statin  - Return to vascular PRN"    Pt called asking for referral for outpt PT at [facility]. She received prosthesis from [employer] a few days ago. She has home PT but states where she currently lives her room is very small and it is difficult to do home PT. [facility] advised her to make apt w/ Lenny Landin at [address] pt but she needs rx. Please advise if ok to place referral for outpt pt in chart for pt.       (pt would like call back once rx placed so she may schedule apt)

## 2023-08-24 ENCOUNTER — HOSPITAL ENCOUNTER (INPATIENT)
Facility: HOSPITAL | Age: 38
LOS: 3 days | Discharge: HOME | End: 2023-08-28
Attending: EMERGENCY MEDICINE | Admitting: INTERNAL MEDICINE
Payer: MEDICARE

## 2023-08-24 ENCOUNTER — APPOINTMENT (EMERGENCY)
Dept: RADIOLOGY | Facility: HOSPITAL | Age: 38
End: 2023-08-24
Payer: MEDICARE

## 2023-08-24 ENCOUNTER — TELEPHONE (OUTPATIENT)
Dept: FAMILY MEDICINE CLINIC | Facility: CLINIC | Age: 38
End: 2023-08-24

## 2023-08-24 ENCOUNTER — APPOINTMENT (EMERGENCY)
Dept: CT IMAGING | Facility: HOSPITAL | Age: 38
End: 2023-08-24
Payer: MEDICARE

## 2023-08-24 DIAGNOSIS — H57.13 DISCOMFORT OF BOTH EYES: ICD-10-CM

## 2023-08-24 DIAGNOSIS — E11.42 TYPE 2 DIABETES MELLITUS WITH DIABETIC POLYNEUROPATHY, WITH LONG-TERM CURRENT USE OF INSULIN (HCC): ICD-10-CM

## 2023-08-24 DIAGNOSIS — Z79.4 TYPE 2 DIABETES MELLITUS WITH DIABETIC POLYNEUROPATHY, WITH LONG-TERM CURRENT USE OF INSULIN (HCC): ICD-10-CM

## 2023-08-24 DIAGNOSIS — R51.9 INTRACTABLE HEADACHE, UNSPECIFIED CHRONICITY PATTERN, UNSPECIFIED HEADACHE TYPE: Primary | ICD-10-CM

## 2023-08-24 PROBLEM — R07.89 ATYPICAL CHEST PAIN: Status: ACTIVE | Noted: 2023-08-24

## 2023-08-24 LAB
ALBUMIN SERPL BCP-MCNC: 3.5 G/DL (ref 3.5–5)
ALBUMIN SERPL BCP-MCNC: 3.8 G/DL (ref 3.5–5)
ALP SERPL-CCNC: 120 U/L (ref 34–104)
ALP SERPL-CCNC: 124 U/L (ref 34–104)
ALT SERPL W P-5'-P-CCNC: 20 U/L (ref 7–52)
ALT SERPL W P-5'-P-CCNC: 21 U/L (ref 7–52)
AMPHETAMINES SERPL QL SCN: NEGATIVE
ANION GAP SERPL CALCULATED.3IONS-SCNC: 10 MMOL/L
ANION GAP SERPL CALCULATED.3IONS-SCNC: 9 MMOL/L
APTT PPP: 24 SECONDS (ref 23–37)
AST SERPL W P-5'-P-CCNC: 17 U/L (ref 13–39)
AST SERPL W P-5'-P-CCNC: 22 U/L (ref 13–39)
BACTERIA UR QL AUTO: NORMAL /HPF
BARBITURATES UR QL: NEGATIVE
BASOPHILS # BLD AUTO: 0.05 THOUSANDS/ÂΜL (ref 0–0.1)
BASOPHILS NFR BLD AUTO: 1 % (ref 0–1)
BENZODIAZ UR QL: NEGATIVE
BETA-HYDROXYBUTYRATE: 1.1 MMOL/L
BILIRUB SERPL-MCNC: 0.39 MG/DL (ref 0.2–1)
BILIRUB SERPL-MCNC: 0.44 MG/DL (ref 0.2–1)
BILIRUB UR QL STRIP: NEGATIVE
BUN SERPL-MCNC: 12 MG/DL (ref 5–25)
BUN SERPL-MCNC: 14 MG/DL (ref 5–25)
CALCIUM SERPL-MCNC: 8.1 MG/DL (ref 8.4–10.2)
CALCIUM SERPL-MCNC: 8.9 MG/DL (ref 8.4–10.2)
CARDIAC TROPONIN I PNL SERPL HS: 2 NG/L
CHLORIDE SERPL-SCNC: 102 MMOL/L (ref 96–108)
CHLORIDE SERPL-SCNC: 99 MMOL/L (ref 96–108)
CLARITY UR: CLEAR
CO2 SERPL-SCNC: 18 MMOL/L (ref 21–32)
CO2 SERPL-SCNC: 24 MMOL/L (ref 21–32)
COCAINE UR QL: NEGATIVE
COLOR UR: ABNORMAL
CREAT SERPL-MCNC: 0.43 MG/DL (ref 0.6–1.3)
CREAT SERPL-MCNC: 0.65 MG/DL (ref 0.6–1.3)
EOSINOPHIL # BLD AUTO: 0.26 THOUSAND/ÂΜL (ref 0–0.61)
EOSINOPHIL NFR BLD AUTO: 2 % (ref 0–6)
ERYTHROCYTE [DISTWIDTH] IN BLOOD BY AUTOMATED COUNT: 20.2 % (ref 11.6–15.1)
GFR SERPL CREATININE-BSD FRML MDRD: 114 ML/MIN/1.73SQ M
GFR SERPL CREATININE-BSD FRML MDRD: 131 ML/MIN/1.73SQ M
GLUCOSE SERPL-MCNC: 218 MG/DL (ref 65–140)
GLUCOSE SERPL-MCNC: 615 MG/DL (ref 65–140)
GLUCOSE SERPL-MCNC: 82 MG/DL (ref 65–140)
GLUCOSE SERPL-MCNC: >500 MG/DL (ref 65–140)
GLUCOSE UR STRIP-MCNC: ABNORMAL MG/DL
HCT VFR BLD AUTO: 35.8 % (ref 34.8–46.1)
HGB BLD-MCNC: 11.9 G/DL (ref 11.5–15.4)
HGB UR QL STRIP.AUTO: NEGATIVE
IMM GRANULOCYTES # BLD AUTO: 0.05 THOUSAND/UL (ref 0–0.2)
IMM GRANULOCYTES NFR BLD AUTO: 1 % (ref 0–2)
INR PPP: 1.66 (ref 0.84–1.19)
KETONES UR STRIP-MCNC: ABNORMAL MG/DL
LEUKOCYTE ESTERASE UR QL STRIP: ABNORMAL
LYMPHOCYTES # BLD AUTO: 3 THOUSANDS/ÂΜL (ref 0.6–4.47)
LYMPHOCYTES NFR BLD AUTO: 28 % (ref 14–44)
MCH RBC QN AUTO: 21.7 PG (ref 26.8–34.3)
MCHC RBC AUTO-ENTMCNC: 33.2 G/DL (ref 31.4–37.4)
MCV RBC AUTO: 65 FL (ref 82–98)
METHADONE UR QL: NEGATIVE
MONOCYTES # BLD AUTO: 0.55 THOUSAND/ÂΜL (ref 0.17–1.22)
MONOCYTES NFR BLD AUTO: 5 % (ref 4–12)
NEUTROPHILS # BLD AUTO: 6.97 THOUSANDS/ÂΜL (ref 1.85–7.62)
NEUTS SEG NFR BLD AUTO: 63 % (ref 43–75)
NITRITE UR QL STRIP: NEGATIVE
NON-SQ EPI CELLS URNS QL MICRO: NORMAL /HPF
NRBC BLD AUTO-RTO: 0 /100 WBCS
OPIATES UR QL SCN: NEGATIVE
OXYCODONE+OXYMORPHONE UR QL SCN: NEGATIVE
PCP UR QL: NEGATIVE
PH UR STRIP.AUTO: 6 [PH]
PLATELET # BLD AUTO: 399 THOUSANDS/UL (ref 149–390)
PMV BLD AUTO: 10.4 FL (ref 8.9–12.7)
POTASSIUM SERPL-SCNC: 4.1 MMOL/L (ref 3.5–5.3)
POTASSIUM SERPL-SCNC: 4.8 MMOL/L (ref 3.5–5.3)
PROT SERPL-MCNC: 6.7 G/DL (ref 6.4–8.4)
PROT SERPL-MCNC: 7.2 G/DL (ref 6.4–8.4)
PROT UR STRIP-MCNC: NEGATIVE MG/DL
PROTHROMBIN TIME: 20.4 SECONDS (ref 11.6–14.5)
RBC # BLD AUTO: 5.49 MILLION/UL (ref 3.81–5.12)
RBC #/AREA URNS AUTO: NORMAL /HPF
SODIUM SERPL-SCNC: 127 MMOL/L (ref 135–147)
SODIUM SERPL-SCNC: 135 MMOL/L (ref 135–147)
SP GR UR STRIP.AUTO: 1.04 (ref 1–1.03)
THC UR QL: NEGATIVE
UROBILINOGEN UR STRIP-ACNC: 3 MG/DL
WBC # BLD AUTO: 10.88 THOUSAND/UL (ref 4.31–10.16)
WBC #/AREA URNS AUTO: NORMAL /HPF

## 2023-08-24 PROCEDURE — 99285 EMERGENCY DEPT VISIT HI MDM: CPT | Performed by: PHYSICIAN ASSISTANT

## 2023-08-24 PROCEDURE — 82010 KETONE BODYS QUAN: CPT

## 2023-08-24 PROCEDURE — 70450 CT HEAD/BRAIN W/O DYE: CPT

## 2023-08-24 PROCEDURE — 99285 EMERGENCY DEPT VISIT HI MDM: CPT

## 2023-08-24 PROCEDURE — 82948 REAGENT STRIP/BLOOD GLUCOSE: CPT

## 2023-08-24 PROCEDURE — G1004 CDSM NDSC: HCPCS

## 2023-08-24 PROCEDURE — 80307 DRUG TEST PRSMV CHEM ANLYZR: CPT

## 2023-08-24 PROCEDURE — 81001 URINALYSIS AUTO W/SCOPE: CPT | Performed by: PHYSICIAN ASSISTANT

## 2023-08-24 PROCEDURE — 71045 X-RAY EXAM CHEST 1 VIEW: CPT

## 2023-08-24 PROCEDURE — 93005 ELECTROCARDIOGRAM TRACING: CPT

## 2023-08-24 PROCEDURE — 84484 ASSAY OF TROPONIN QUANT: CPT | Performed by: PHYSICIAN ASSISTANT

## 2023-08-24 PROCEDURE — 85025 COMPLETE CBC W/AUTO DIFF WBC: CPT | Performed by: PHYSICIAN ASSISTANT

## 2023-08-24 PROCEDURE — 85610 PROTHROMBIN TIME: CPT | Performed by: PHYSICIAN ASSISTANT

## 2023-08-24 PROCEDURE — 80053 COMPREHEN METABOLIC PANEL: CPT | Performed by: PHYSICIAN ASSISTANT

## 2023-08-24 PROCEDURE — 36415 COLL VENOUS BLD VENIPUNCTURE: CPT | Performed by: PHYSICIAN ASSISTANT

## 2023-08-24 PROCEDURE — 99223 1ST HOSP IP/OBS HIGH 75: CPT | Performed by: HOSPITALIST

## 2023-08-24 PROCEDURE — 96365 THER/PROPH/DIAG IV INF INIT: CPT

## 2023-08-24 PROCEDURE — 83930 ASSAY OF BLOOD OSMOLALITY: CPT

## 2023-08-24 PROCEDURE — 85730 THROMBOPLASTIN TIME PARTIAL: CPT | Performed by: PHYSICIAN ASSISTANT

## 2023-08-24 PROCEDURE — 80053 COMPREHEN METABOLIC PANEL: CPT

## 2023-08-24 PROCEDURE — 96375 TX/PRO/DX INJ NEW DRUG ADDON: CPT

## 2023-08-24 RX ORDER — MAGNESIUM SULFATE HEPTAHYDRATE 40 MG/ML
2 INJECTION, SOLUTION INTRAVENOUS
Status: DISCONTINUED | OUTPATIENT
Start: 2023-08-25 | End: 2023-08-26

## 2023-08-24 RX ORDER — SODIUM CHLORIDE, SODIUM LACTATE, POTASSIUM CHLORIDE, CALCIUM CHLORIDE 600; 310; 30; 20 MG/100ML; MG/100ML; MG/100ML; MG/100ML
100 INJECTION, SOLUTION INTRAVENOUS CONTINUOUS
Status: DISPENSED | OUTPATIENT
Start: 2023-08-25 | End: 2023-08-25

## 2023-08-24 RX ORDER — INSULIN LISPRO 100 [IU]/ML
15 INJECTION, SOLUTION INTRAVENOUS; SUBCUTANEOUS ONCE
Status: CANCELLED | OUTPATIENT
Start: 2023-08-24

## 2023-08-24 RX ORDER — GABAPENTIN 300 MG/1
300 CAPSULE ORAL 3 TIMES DAILY
Status: DISCONTINUED | OUTPATIENT
Start: 2023-08-24 | End: 2023-08-28 | Stop reason: HOSPADM

## 2023-08-24 RX ORDER — DIPHENHYDRAMINE HYDROCHLORIDE 50 MG/ML
25 INJECTION INTRAMUSCULAR; INTRAVENOUS EVERY 8 HOURS SCHEDULED
Status: DISCONTINUED | OUTPATIENT
Start: 2023-08-24 | End: 2023-08-26

## 2023-08-24 RX ORDER — OXYCODONE HYDROCHLORIDE 5 MG/1
5 TABLET ORAL EVERY 4 HOURS PRN
Status: DISCONTINUED | OUTPATIENT
Start: 2023-08-24 | End: 2023-08-25

## 2023-08-24 RX ORDER — FLUTICASONE FUROATE AND VILANTEROL 200; 25 UG/1; UG/1
1 POWDER RESPIRATORY (INHALATION) DAILY
Status: DISCONTINUED | OUTPATIENT
Start: 2023-08-25 | End: 2023-08-28 | Stop reason: HOSPADM

## 2023-08-24 RX ORDER — DULOXETIN HYDROCHLORIDE 20 MG/1
40 CAPSULE, DELAYED RELEASE ORAL DAILY
Status: DISCONTINUED | OUTPATIENT
Start: 2023-08-25 | End: 2023-08-28 | Stop reason: HOSPADM

## 2023-08-24 RX ORDER — ASPIRIN 81 MG/1
81 TABLET, CHEWABLE ORAL DAILY
Status: DISCONTINUED | OUTPATIENT
Start: 2023-08-25 | End: 2023-08-28 | Stop reason: HOSPADM

## 2023-08-24 RX ORDER — INSULIN LISPRO 100 [IU]/ML
1-5 INJECTION, SOLUTION INTRAVENOUS; SUBCUTANEOUS
Status: DISCONTINUED | OUTPATIENT
Start: 2023-08-24 | End: 2023-08-24

## 2023-08-24 RX ORDER — HYDROXYZINE HYDROCHLORIDE 25 MG/1
50 TABLET, FILM COATED ORAL EVERY 8 HOURS PRN
Status: DISCONTINUED | OUTPATIENT
Start: 2023-08-24 | End: 2023-08-28 | Stop reason: HOSPADM

## 2023-08-24 RX ORDER — METOCLOPRAMIDE HYDROCHLORIDE 5 MG/ML
10 INJECTION INTRAMUSCULAR; INTRAVENOUS ONCE
Status: DISCONTINUED | OUTPATIENT
Start: 2023-08-24 | End: 2023-08-24

## 2023-08-24 RX ORDER — HYDROMORPHONE HYDROCHLORIDE 2 MG/1
4 TABLET ORAL EVERY 4 HOURS PRN
Status: DISCONTINUED | OUTPATIENT
Start: 2023-08-24 | End: 2023-08-25

## 2023-08-24 RX ORDER — MAGNESIUM SULFATE HEPTAHYDRATE 40 MG/ML
2 INJECTION, SOLUTION INTRAVENOUS ONCE
Status: DISCONTINUED | OUTPATIENT
Start: 2023-08-24 | End: 2023-08-24

## 2023-08-24 RX ORDER — METOCLOPRAMIDE HYDROCHLORIDE 5 MG/ML
10 INJECTION INTRAMUSCULAR; INTRAVENOUS ONCE
Status: COMPLETED | OUTPATIENT
Start: 2023-08-24 | End: 2023-08-24

## 2023-08-24 RX ORDER — METOCLOPRAMIDE HYDROCHLORIDE 5 MG/ML
10 INJECTION INTRAMUSCULAR; INTRAVENOUS EVERY 8 HOURS SCHEDULED
Status: DISCONTINUED | OUTPATIENT
Start: 2023-08-24 | End: 2023-08-26

## 2023-08-24 RX ORDER — INSULIN LISPRO 100 [IU]/ML
1-5 INJECTION, SOLUTION INTRAVENOUS; SUBCUTANEOUS
Status: DISCONTINUED | OUTPATIENT
Start: 2023-08-25 | End: 2023-08-24

## 2023-08-24 RX ORDER — DIPHENHYDRAMINE HYDROCHLORIDE 50 MG/ML
25 INJECTION INTRAMUSCULAR; INTRAVENOUS ONCE
Status: DISCONTINUED | OUTPATIENT
Start: 2023-08-24 | End: 2023-08-24

## 2023-08-24 RX ORDER — HYDROMORPHONE HCL/PF 1 MG/ML
1 SYRINGE (ML) INJECTION ONCE
Status: COMPLETED | OUTPATIENT
Start: 2023-08-24 | End: 2023-08-24

## 2023-08-24 RX ORDER — ALBUTEROL SULFATE 90 UG/1
2 AEROSOL, METERED RESPIRATORY (INHALATION) EVERY 4 HOURS PRN
Status: DISCONTINUED | OUTPATIENT
Start: 2023-08-24 | End: 2023-08-28 | Stop reason: HOSPADM

## 2023-08-24 RX ORDER — KETOROLAC TROMETHAMINE 30 MG/ML
30 INJECTION, SOLUTION INTRAMUSCULAR; INTRAVENOUS ONCE
Status: DISCONTINUED | OUTPATIENT
Start: 2023-08-24 | End: 2023-08-24

## 2023-08-24 RX ORDER — DIPHENHYDRAMINE HYDROCHLORIDE 50 MG/ML
25 INJECTION INTRAMUSCULAR; INTRAVENOUS ONCE
Status: COMPLETED | OUTPATIENT
Start: 2023-08-24 | End: 2023-08-24

## 2023-08-24 RX ORDER — TOPIRAMATE 25 MG/1
25 TABLET ORAL 2 TIMES DAILY
Status: DISCONTINUED | OUTPATIENT
Start: 2023-08-24 | End: 2023-08-28 | Stop reason: HOSPADM

## 2023-08-24 RX ORDER — ONDANSETRON 2 MG/ML
4 INJECTION INTRAMUSCULAR; INTRAVENOUS EVERY 6 HOURS PRN
Status: DISCONTINUED | OUTPATIENT
Start: 2023-08-24 | End: 2023-08-24

## 2023-08-24 RX ORDER — MAGNESIUM SULFATE HEPTAHYDRATE 40 MG/ML
2 INJECTION, SOLUTION INTRAVENOUS ONCE
Status: COMPLETED | OUTPATIENT
Start: 2023-08-24 | End: 2023-08-24

## 2023-08-24 RX ORDER — PRAZOSIN HYDROCHLORIDE 1 MG/1
1 CAPSULE ORAL
Status: DISCONTINUED | OUTPATIENT
Start: 2023-08-24 | End: 2023-08-28 | Stop reason: HOSPADM

## 2023-08-24 RX ORDER — SODIUM CHLORIDE 9 MG/ML
100 INJECTION, SOLUTION INTRAVENOUS ONCE
Status: DISCONTINUED | OUTPATIENT
Start: 2023-08-24 | End: 2023-08-28 | Stop reason: HOSPADM

## 2023-08-24 RX ORDER — ATORVASTATIN CALCIUM 40 MG/1
40 TABLET, FILM COATED ORAL
Status: DISCONTINUED | OUTPATIENT
Start: 2023-08-24 | End: 2023-08-28 | Stop reason: HOSPADM

## 2023-08-24 RX ORDER — INSULIN GLARGINE 100 [IU]/ML
45 INJECTION, SOLUTION SUBCUTANEOUS
Status: DISCONTINUED | OUTPATIENT
Start: 2023-08-24 | End: 2023-08-24

## 2023-08-24 RX ORDER — FLUTICASONE PROPIONATE AND SALMETEROL 250; 50 UG/1; UG/1
1 POWDER RESPIRATORY (INHALATION) EVERY 12 HOURS SCHEDULED
Status: DISCONTINUED | OUTPATIENT
Start: 2023-08-24 | End: 2023-08-24 | Stop reason: RX

## 2023-08-24 RX ORDER — DEXAMETHASONE SODIUM PHOSPHATE 10 MG/ML
10 INJECTION, SOLUTION INTRAMUSCULAR; INTRAVENOUS ONCE
Status: COMPLETED | OUTPATIENT
Start: 2023-08-24 | End: 2023-08-24

## 2023-08-24 RX ORDER — SODIUM CHLORIDE, SODIUM LACTATE, POTASSIUM CHLORIDE, CALCIUM CHLORIDE 600; 310; 30; 20 MG/100ML; MG/100ML; MG/100ML; MG/100ML
250 INJECTION, SOLUTION INTRAVENOUS CONTINUOUS
Status: DISPENSED | OUTPATIENT
Start: 2023-08-24 | End: 2023-08-25

## 2023-08-24 RX ORDER — KETOROLAC TROMETHAMINE 30 MG/ML
15 INJECTION, SOLUTION INTRAMUSCULAR; INTRAVENOUS ONCE
Status: COMPLETED | OUTPATIENT
Start: 2023-08-24 | End: 2023-08-24

## 2023-08-24 RX ORDER — KETOROLAC TROMETHAMINE 30 MG/ML
30 INJECTION, SOLUTION INTRAMUSCULAR; INTRAVENOUS EVERY 8 HOURS SCHEDULED
Status: DISCONTINUED | OUTPATIENT
Start: 2023-08-25 | End: 2023-08-26

## 2023-08-24 RX ORDER — INSULIN LISPRO 100 [IU]/ML
10-15 INJECTION, SOLUTION INTRAVENOUS; SUBCUTANEOUS
Status: DISCONTINUED | OUTPATIENT
Start: 2023-08-25 | End: 2023-08-24

## 2023-08-24 RX ORDER — ACETAMINOPHEN 325 MG/1
975 TABLET ORAL EVERY 6 HOURS PRN
Status: DISCONTINUED | OUTPATIENT
Start: 2023-08-24 | End: 2023-08-28 | Stop reason: HOSPADM

## 2023-08-24 RX ADMIN — METOCLOPRAMIDE 10 MG: 5 INJECTION, SOLUTION INTRAMUSCULAR; INTRAVENOUS at 21:56

## 2023-08-24 RX ADMIN — SODIUM CHLORIDE, SODIUM LACTATE, POTASSIUM CHLORIDE, AND CALCIUM CHLORIDE 100 ML/HR: .6; .31; .03; .02 INJECTION, SOLUTION INTRAVENOUS at 21:59

## 2023-08-24 RX ADMIN — HYDROMORPHONE HYDROCHLORIDE 1 MG: 1 INJECTION, SOLUTION INTRAMUSCULAR; INTRAVENOUS; SUBCUTANEOUS at 18:37

## 2023-08-24 RX ADMIN — METOCLOPRAMIDE 10 MG: 5 INJECTION, SOLUTION INTRAMUSCULAR; INTRAVENOUS at 16:14

## 2023-08-24 RX ADMIN — GABAPENTIN 300 MG: 300 CAPSULE ORAL at 20:22

## 2023-08-24 RX ADMIN — MAGNESIUM SULFATE HEPTAHYDRATE 2 G: 40 INJECTION, SOLUTION INTRAVENOUS at 16:27

## 2023-08-24 RX ADMIN — SODIUM CHLORIDE 1000 ML: 0.9 INJECTION, SOLUTION INTRAVENOUS at 16:17

## 2023-08-24 RX ADMIN — KETOROLAC TROMETHAMINE 15 MG: 30 INJECTION, SOLUTION INTRAMUSCULAR; INTRAVENOUS at 21:07

## 2023-08-24 RX ADMIN — TOPIRAMATE 25 MG: 25 TABLET ORAL at 20:22

## 2023-08-24 RX ADMIN — DIPHENHYDRAMINE HYDROCHLORIDE 25 MG: 50 INJECTION, SOLUTION INTRAMUSCULAR; INTRAVENOUS at 16:12

## 2023-08-24 RX ADMIN — PRAZOSIN HYDROCHLORIDE 1 MG: 1 CAPSULE ORAL at 21:07

## 2023-08-24 RX ADMIN — DIPHENHYDRAMINE HYDROCHLORIDE 25 MG: 50 INJECTION, SOLUTION INTRAMUSCULAR; INTRAVENOUS at 21:55

## 2023-08-24 RX ADMIN — DEXAMETHASONE SODIUM PHOSPHATE 10 MG: 10 INJECTION, SOLUTION INTRAMUSCULAR; INTRAVENOUS at 16:09

## 2023-08-24 RX ADMIN — INSULIN GLARGINE 45 UNITS: 100 INJECTION, SOLUTION SUBCUTANEOUS at 21:56

## 2023-08-24 RX ADMIN — ATORVASTATIN CALCIUM 40 MG: 40 TABLET, FILM COATED ORAL at 20:22

## 2023-08-24 RX ADMIN — OXYCODONE HYDROCHLORIDE 5 MG: 5 TABLET ORAL at 22:38

## 2023-08-24 RX ADMIN — INSULIN LISPRO 5 UNITS: 100 INJECTION, SOLUTION INTRAVENOUS; SUBCUTANEOUS at 22:03

## 2023-08-24 NOTE — ED NOTES
No palpable or visible veins ultrasound requested.  Pt has purewick in place     Julio César Cornell RN  08/24/23 0280

## 2023-08-24 NOTE — ED PROVIDER NOTES
History  Chief Complaint   Patient presents with   • Chest Pain     Intermittant chest pain and headache     44-year-old female presents to the emergency department with complaints of intermittent chest discomfort. States that she has had ongoing symptoms and is currently seeing cardiology. Wearing a Holter monitor presently. Denies chest pain at this time. Additionally states that her home health care nurse sent her to the emergency department for elevated blood pressures. States that her pressure at home was 168/110. Notes that she has had a squeezing headache since yesterday. States that headache seems to be radiating throughout her whole head. Additionally reports sensitivity to light and sound as well as nausea without vomiting. Reports that she has a history of migraines but has not had a migraine in a long time. Is unable to differentiate between symptoms she is having now and her regular migraines. Did take her routine medications including Dilaudid at home to help with symptoms without relief. History provided by:  Patient   used: No    Chest Pain      Prior to Admission Medications   Prescriptions Last Dose Informant Patient Reported? Taking? Advair -21 MCG/ACT inhaler   No No   Sig: Inhale 2 puffs 2 (two) times a day Rinse mouth after use   Alcohol Swabs (Pharmacist Choice Alcohol) PADS  Outside Facility (Specify), Self No No   Sig: USE 3-4 TIMES AS DIRECTED.    Blood Glucose Monitoring Suppl (ONE TOUCH ULTRA 2) w/Device KIT   Yes No   Sig: BY DEVICE ROUTE 2 (TWO) TIMES A DAY CHECK BLOOD SUGARS AND RECORD VALUE AND TIME OF DAY TWICE A DAY   Blood Pressure Monitoring (Blood Pressure Monitor Automat) KATLYN  Outside Facility (Specify), Self No No   Sig: Use Daily as Directed   Comfort EZ Pen Needles 33G X 4 MM MISC  Outside Facility (Specify), Self No No   Sig: USE AS DIRECTED   DULoxetine (CYMBALTA) 20 mg capsule   No No   Sig: Take 2 capsules (40 mg total) by mouth daily   Diclofenac Sodium (VOLTAREN) 1 %   No No   Sig: Apply 2 g topically 4 (four) times a day For pain to affected area   Global Inject Ease Lancets 30G MISC  Outside Facility (Specify), Self No No   Sig: USE 3 (THREE) TIMES A DAY   HYDROmorphone (DILAUDID) 2 mg tablet  Outside Facility (Specify), Self No No   Sig: Take 1 (2mg) to 2 tabs (4mg) by mouth every 4 hours as needed for moderate to severe pain   Insulin Glargine Solostar 100 UNIT/ML SOPN   Yes No   Sig: INJECT 45 UNITS SUBCUTANEOULY ONE TIME A DAY   Insulin Pen Needle (Comfort EZ Pen Needles) 32G X 4 MM MISC  Outside Facility (Specify), Self No No   Sig: Inject under the skin 4 (four) times a day (before meals and at bedtime)   Lancet Devices (Lancing Device) MISC   No No   Sig: Place on the skin if needed (use as directed w/ glucometer to check blood sugar) Use as directed   OneTouch Ultra test strip   Yes No   Sig: TEST TWICE DAILY ()   QUEtiapine (SEROquel) 200 mg tablet  Outside Facility (Specify), Self Yes No   Sig: Take by mouth daily at bedtime   Trulicity 1.5 GH/2.3LU injection  Outside Facility (Specify), Self Yes No   Sig: As directed   albuterol (PROVENTIL HFA,VENTOLIN HFA) 90 mcg/act inhaler   No No   Sig: Inhale 2 puffs every 4 (four) hours as needed for wheezing or shortness of breath   aspirin 81 mg chewable tablet   No No   Sig: Chew 1 tablet (81 mg total) daily   atorvastatin (LIPITOR) 40 mg tablet   No No   Sig: Take 1 tablet (40 mg total) by mouth daily with dinner   bisacodyl (DULCOLAX) 10 mg suppository  Outside Facility (Specify), Self No No   Sig: Insert 1 suppository (10 mg total) into the rectum daily as needed for constipation   Patient not taking: Reported on 6/27/2023   gabapentin (NEURONTIN) 300 mg capsule  Outside Facility (Specify), Self No No   Sig: Take 1 capsule (300 mg total) by mouth 3 (three) times a day   hydrOXYzine HCL (ATARAX) 50 mg tablet  Outside Facility (Specify), Self No No   Sig: Take 1 tablet (50 mg total) by mouth every 8 (eight) hours as needed for itching or anxiety   insulin glargine (LANTUS) 100 units/mL subcutaneous injection  Outside Facility (Specify), Self No No   Sig: Inject 45 Units under the skin daily at bedtime   insulin lispro (HumaLOG) 100 units/mL injection  Outside Facility (Specify), Self No No   Sig: Inject 2-12 Units under the skin 3 (three) times a day before meals   Patient not taking: Reported on 8/16/2023   insulin lispro (HumaLOG) 100 units/mL injection  Outside Facility ([address] [place] [address]), Self No No   Sig: Inject 2-12 Units under the skin daily at bedtime   Patient not taking: Reported on 8/16/2023   insulin lispro (HumaLOG) 100 units/mL injection  Outside Facility ([address] [place] [address]), Self No No   Sig: Inject 12 Units under the skin daily with dinner   Patient not taking: Reported on 8/16/2023   insulin lispro (HumaLOG) 100 units/mL injection  Outside Facility ([address] [place] [address]), Self No No   Sig: Inject 20 Units under the skin daily with lunch   Patient not taking: Reported on 8/16/2023   insulin lispro (HumaLOG) 100 units/mL injection  Outside Facility (Specify), Self No No   Sig: Inject 20 Units under the skin daily with breakfast Do not start before April 20, 2023.    lidocaine (LIDODERM) 5 %   No No   Sig: Apply 1 patch topically over 12 hours every 24 hours Remove & Discard patch within 12 hours or as directed by MD   Patient not taking: Reported on 8/16/2023   lisinopril (ZESTRIL) 20 mg tablet   No No   Sig: Take 1 tablet (20 mg total) by mouth daily   melatonin 3 mg   No No   Sig: Take 2 tablets (6 mg total) by mouth daily at bedtime   Patient not taking: Reported on 8/16/2023   metFORMIN (GLUCOPHAGE) 1000 MG tablet  Outside Facility (Specify), Self No No   Sig: TAKE ONE (1) TABLET BY MOUTH TWICE DAILY WITH FOOD   methocarbamol (ROBAXIN) 750 mg tablet  Outside Facility (Specify), Self No No   Sig: Take 1 tablet (750 mg total) by mouth every 6 (six) hours as needed for muscle spasms   Patient not taking: Reported on 8/16/2023   nicotine (NICODERM CQ) 21 mg/24 hr TD 24 hr patch   No No   Sig: Place 1 patch on the skin over 24 hours daily   Patient not taking: Reported on 8/16/2023   nicotine polacrilex (NICORETTE) 2 mg gum   No No   Sig: Chew 1 each (2 mg total) as needed for smoking cessation   Patient not taking: Reported on 8/16/2023   polyethylene glycol (MIRALAX) 17 g packet  Outside Facility ([address] [place] [address]), Self No No   Sig: Take 17 g by mouth daily   Patient not taking: Reported on 8/16/2023   prazosin (MINIPRESS) 1 mg capsule   No No   Sig: Take 1 capsule (1 mg total) by mouth daily at bedtime   rivaroxaban (XARELTO) 20 mg tablet   No No   Sig: Take 1 tablet (20 mg total) by mouth daily with breakfast   senna-docusate sodium (SENOKOT S) 8.6-50 mg per tablet  Outside Facility (Specify), Self No No   Sig: Take 2 tablets by mouth 2 (two) times a day   Patient not taking: Reported on 8/16/2023   simethicone (MYLICON) 80 mg chewable tablet  Outside Facility (Specify), Self No No   Sig: Chew 1 tablet (80 mg total) every 6 (six) hours as needed for flatulence   Patient not taking: Reported on 8/16/2023   topiramate (TOPAMAX) 25 mg tablet  Outside Facility (Specify), Self No No   Sig: Take 1 tablet (25 mg total) by mouth 2 (two) times a day   white petrolatum-mineral oil (EUCERIN,HYDROCERIN) cream  Outside Facility (Specify), Self No No   Sig: Apply topically 3 (three) times a day as needed (dry skin)   Patient not taking: Reported on 6/27/2023      Facility-Administered Medications: None       Past Medical History:   Diagnosis Date   • Asthma    • Atherosclerosis    • Bipolar 1 disorder ([facility])    • COPD (chronic obstructive pulmonary disease) ([facility])    • Depression    • Diabetes mellitus ([address] )    • Hypertension    • Psychiatric disorder     cutting history   • PTSD (post-traumatic stress disorder)    • Schizoaffective disorder ([facility])    • Tendonitis        Past Surgical History:   Procedure Laterality Date   • AMPUTATION ABOVE KNEE (AKA) Left 3/29/2023    Procedure: AMPUTATION ABOVE KNEE (AKA); Surgeon: Susan Wiley MD;  Location: [facility];  Service: Vascular   • BYPASS FEMORAL-POPLITEAL Left 2021    Procedure: Left Common Femoral Below Knee to Popliteal Bypass with Insitu GSV graft. Left Lower Extremity Angiogram;  Surgeon: Susan Wiley MD;  Location[facility];  Service: Vascular   •  SECTION     • EAR SURGERY     • IR LOWER EXTREMITY ANGIOGRAM  2021   • IR LOWER EXTREMITY ANGIOGRAM  2021   • AL SLCTV CATHJ 3RD+ ORD SLCTV ABDL PEL/LXTR [facility] Left 3/24/2023    Procedure: Left leg angiogram;  Surgeon: Yaneli Arauz DO;  Location: [facility];  Service: Vascular   • TUBAL LIGATION         Family History   Problem Relation Age of Onset   • Hypertension Mother    • Diabetes Mother    • HIV Mother    • Heart disease Mother    • No Known Problems Father      I have reviewed and agree with the history as documented. E-Cigarette/Vaping   • E-Cigarette Use Never User      E-Cigarette/Vaping Substances   • Nicotine No    • THC No    • CBD No    • Flavoring No    • Other No    • Unknown No      Social History     Tobacco Use   • Smoking status: Former     Packs/day: 1.00     Years: 20.00     Total pack years: 20.00     Types: Cigarettes     Quit date: 3/24/2023     Years since quittin.4   • Smokeless tobacco: Former     Quit date: 2022   Vaping Use   • Vaping Use: Never used   Substance Use Topics   • Alcohol use: Not Currently     Comment: not currently   • Drug use: Not Currently     Types: Cocaine, Marijuana, "Crack" cocaine     Comment: 1 years clean from crack cocaine since        Review of Systems   Cardiovascular: Positive for chest pain. Physical Exam  Physical Exam  Vitals and nursing note reviewed. Constitutional:       General: She is not in acute distress. Appearance: She is not diaphoretic. HENT:      Head: Normocephalic and atraumatic.       Right Ear: External ear normal.      Left Ear: External ear normal.      Mouth/Throat:      Pharynx: No oropharyngeal exudate. Eyes:      Extraocular Movements: Extraocular movements intact. Conjunctiva/sclera: Conjunctivae normal.      Pupils: Pupils are equal, round, and reactive to light. Neck:      Vascular: No JVD. Trachea: No tracheal deviation. Cardiovascular:      Rate and Rhythm: Normal rate and regular rhythm. Heart sounds: Normal heart sounds. No murmur heard. No friction rub. No gallop. Pulmonary:      Effort: Pulmonary effort is normal. No respiratory distress. Breath sounds: Normal breath sounds. No wheezing or rales. Chest:      Chest wall: No tenderness. Abdominal:      General: Bowel sounds are normal. There is no distension. Palpations: Abdomen is soft. Tenderness: There is no abdominal tenderness. There is no guarding. Musculoskeletal:         General: No tenderness or deformity. Normal range of motion. Comments: Left AKA   Lymphadenopathy:      Cervical: No cervical adenopathy. Skin:     General: Skin is warm and dry. Findings: No erythema or rash. Neurological:      General: No focal deficit present. Mental Status: She is alert and oriented to person, place, and time.    Psychiatric:         Mood and Affect: Mood normal.         Behavior: Behavior normal.         Vital Signs  ED Triage Vitals [08/24/23 1338]   Temperature Pulse Respirations Blood Pressure SpO2   97.9 °F (36.6 °C) 85 16 162/83 99 %      Temp Source Heart Rate Source Patient Position - Orthostatic VS BP Location FiO2 (%)   Oral Monitor Lying Right arm --      Pain Score       9           Vitals:    08/24/23 1338   BP: 162/83   Pulse: 85   Patient Position - Orthostatic VS: Lying         Visual Acuity      ED Medications  Medications   sodium chloride 0.9 % bolus 1,000 mL (1,000 mL Intravenous New Bag 8/24/23 1617)   HYDROmorphone (DILAUDID) injection 1 mg (has no administration in time range)   dexamethasone (PF) (DECADRON) injection 10 mg (10 mg Intravenous Given 8/24/23 1609)   metoclopramide (REGLAN) injection 10 mg (10 mg Intravenous Given 8/24/23 1614)   diphenhydrAMINE (BENADRYL) injection 25 mg (25 mg Intravenous Given 8/24/23 1612)   magnesium sulfate 2 g/50 mL IVPB (premix) 2 g (2 g Intravenous New Bag 8/24/23 1627)       Diagnostic Studies  Results Reviewed     Procedure Component Value Units Date/Time    Comprehensive metabolic panel [[id number]]  (Abnormal) Collected: 08/24/23 1632    Lab Status: Final result Specimen: Blood from Arm, Right Updated: 08/24/23 1731     Sodium 135 mmol/L      Potassium 4.1 mmol/L      Chloride 102 mmol/L      CO2 24 mmol/L      ANION GAP 9 mmol/L      BUN 12 mg/dL      Creatinine 0.43 mg/dL      Glucose 218 mg/dL      Calcium 8.9 mg/dL      AST 17 U/L      ALT 20 U/L      Alkaline Phosphatase 120 U/L      Total Protein 7.2 g/dL      Albumin 3.8 g/dL      Total Bilirubin 0.39 mg/dL      eGFR 131 ml/min/1.73sq m     Narrative:      [place] guidelines for Chronic Kidney Disease (CKD):   •  Stage 1 with normal or high GFR (GFR > 90 mL/min/1.73 square meters)  •  Stage 2 Mild CKD (GFR = 60-89 mL/min/1.73 square meters)  •  Stage 3A Moderate CKD (GFR = 45-59 mL/min/1.73 square meters)  •  Stage 3B Moderate CKD (GFR = 30-44 mL/min/1.73 square meters)  •  Stage 4 Severe CKD (GFR = 15-29 mL/min/1.73 square meters)  •  Stage 5 End Stage CKD (GFR <15 mL/min/1.73 square meters)  Note: GFR calculation is accurate only with a steady state creatinine    HS Troponin I 2hr [[id number]]     Lab Status: No result Specimen: Blood     HS Troponin 0hr (reflex protocol) [[id number]]  (Normal) Collected: 08/24/23 1632    Lab Status: Final result Specimen: Blood from Arm, Right Updated: 08/24/23 1705     hs TnI 0hr 2 ng/L     Protime-INR [[id number]]  (Abnormal) Collected: 08/24/23 0645    Lab Status: Final result Specimen: Blood from Arm, Right Updated: 08/24/23 1656     Protime 20.4 seconds      INR 1.66    APTT [[id number]]  (Normal) Collected: 08/24/23 1632    Lab Status: Final result Specimen: Blood from Arm, Right Updated: 08/24/23 1656     PTT 24 seconds     CBC and differential [[id number]]  (Abnormal) Collected: 08/24/23 1632    Lab Status: Final result Specimen: Blood from Arm, Right Updated: 08/24/23 1643     WBC 10.88 Thousand/uL      RBC 5.49 Million/uL      Hemoglobin 11.9 g/dL      Hematocrit 35.8 %      MCV 65 fL      MCH 21.7 pg      MCHC 33.2 g/dL      RDW 20.2 %      MPV 10.4 fL      Platelets 776 Thousands/uL      nRBC 0 /100 WBCs      Neutrophils Relative 63 %      Immat GRANS % 1 %      Lymphocytes Relative 28 %      Monocytes Relative 5 %      Eosinophils Relative 2 %      Basophils Relative 1 %      Neutrophils Absolute 6.97 Thousands/µL      Immature Grans Absolute 0.05 Thousand/uL      Lymphocytes Absolute 3.00 Thousands/µL      Monocytes Absolute 0.55 Thousand/µL      Eosinophils Absolute 0.26 Thousand/µL      Basophils Absolute 0.05 Thousands/µL     Urine Microscopic [[id number]]  (Normal) Collected: 08/24/23 1452    Lab Status: Final result Specimen: Urine, Clean Catch Updated: 08/24/23 1531     RBC, UA None Seen /hpf      WBC, UA 1-2 /hpf      Epithelial Cells Occasional /hpf      Bacteria, UA Occasional /hpf     UA w Reflex to Microscopic w Reflex to Culture [[id number]]  (Abnormal) Collected: 08/24/23 1452    Lab Status: Final result Specimen: Urine, Clean Catch Updated: 08/24/23 1522     Color, UA Light Yellow     Clarity, UA Clear     Specific Gravity, UA 1.042     pH, UA 6.0     Leukocytes, UA Elevated glucose may cause decreased leukocyte values.  See urine microscopic for UWBC result     Nitrite, UA Negative     Protein, UA Negative mg/dl      Glucose, UA >=1000 (1%) mg/dl      Ketones, UA 80 (3+) mg/dl      Urobilinogen, UA 3.0 mg/dl      Bilirubin, UA Negative     Occult Blood, UA Negative                 CT head without contrast Final Result by Maryana Smith MD (08/24 1544)      No acute intracranial abnormality. Hypoattenuation left lentiform nucleus and corresponding left frontal horn ex vacuo dilatation concerning for chronic infarction but new since prior 2020 study. Consider follow-up nonemergent MRI of the brain especially    given the patient's age. The study was marked in EPIC for significant notification. Workstation performed: [id number]         XR chest 1 view portable   ED Interpretation by Hank Cruz PA-C (08/24 1333)   No focal consolidation      Final Result by Mj Vo MD (08/24 1507)      No acute cardiopulmonary disease. Workstation performed: [id number]                    Procedures  ECG 12 Lead Documentation Only    Date/Time: 8/24/2023 2:28 PM    Performed by: Hank Cruz PA-C  Authorized by: Hank Cruz PA-C    Indications / Diagnosis:  Pain  ECG reviewed by me, the ED Provider: yes    Patient location:  ED  Previous ECG:     Previous ECG:  Compared to current    Comparison ECG info:  3/26/23    Similarity:  Changes noted  Interpretation:     Interpretation: abnormal    Rate:     ECG rate:  84    ECG rate assessment: normal    Rhythm:     Rhythm: sinus rhythm    Ectopy:     Ectopy: none    QRS:     QRS axis:  Normal    QRS intervals:  Normal  Conduction:     Conduction: normal    ST segments:     ST segments:  Normal  T waves:     T waves: inverted      Inverted:  V1  Q waves:     Q waves:  V1 and V2             ED Course  ED Course as of 08/24/23 1756   Thu Aug 24, 2023   1631 Discussed with neurology. Can follow-up as an outpatient if symptoms controlled. 1705 Patient currently sleeping comfortably. 1725 Call placed to the lab regarding CMP. Currently with 2 machines down and running calibration. May take another 15 minutes before sample was able to be run.                                              Medical Decision Making  Differential diagnosis includes but not limited to: migraine headache, ACS, intracranial abnormality     Intractable headache, unspecified chronicity pattern, unspecified headache type: acute illness or injury  Amount and/or Complexity of Data Reviewed  Labs: ordered. Radiology: ordered and independent interpretation performed. Risk  Prescription drug management. Decision regarding hospitalization. Disposition  Final diagnoses:   Intractable headache, unspecified chronicity pattern, unspecified headache type     Time reflects when diagnosis was documented in both MDM as applicable and the Disposition within this note     Time User Action Codes Description Comment    8/24/2023  5:[facility] [facility] Intractable headache, unspecified chronicity pattern, unspecified headache type       ED Disposition     ED Disposition   Admit    Condition   Stable    Date/Time   Thu Aug 24, 2023  5:54 PM    Comment   Case was discussed with [facility] and the patient's admission status was agreed to be Admission Status: observation status to the service of Dr. Anneliese Naylor . Follow-up Information    None         Patient's Medications   Discharge Prescriptions    No medications on file       No discharge procedures on file.     PDMP Review       Value Time User    PDMP Reviewed  Yes 8/10/2023  5:06 AM Amy Bernabe, [address] [place] [address]          ED Provider  Electronically Signed by           Jamin Oliveira PA-C  08/24/23 5694

## 2023-08-24 NOTE — ED NOTES
Two attempts make by this RN to secure an IV, using [facility]. Both were unsuccessful. [facility] is aware.      Gloria House RN  08/24/23 3148

## 2023-08-24 NOTE — TELEPHONE ENCOUNTER
Caller: Tootie Fernandes "Nurse"    Doctor: Audrey Sullivan     Reason for call: Tootie Fernandes wanted to inform the Doctor that the Patient is on her way to the ER to get elevated for High blood pressure. The blood pressure on the Left arm was 160/110 and the right arm was 140/98. Patient was going to [address] [place] [address].

## 2023-08-25 PROBLEM — I16.0 HYPERTENSIVE URGENCY: Status: ACTIVE | Noted: 2020-08-07

## 2023-08-25 PROBLEM — E11.40 DIABETIC NEUROPATHY (HCC): Status: ACTIVE | Noted: 2018-07-18

## 2023-08-25 PROBLEM — H53.2 DIPLOPIA: Status: ACTIVE | Noted: 2018-10-24

## 2023-08-25 PROBLEM — T14.91XA SUICIDE ATTEMPT (HCC): Status: ACTIVE | Noted: 2018-07-18

## 2023-08-25 PROBLEM — J45.40 MODERATE PERSISTENT ASTHMA WITHOUT COMPLICATION: Status: ACTIVE | Noted: 2018-04-10

## 2023-08-25 PROBLEM — R55 SYNCOPE: Status: ACTIVE | Noted: 2020-08-07

## 2023-08-25 PROBLEM — F14.90 COCAINE USE: Status: ACTIVE | Noted: 2018-10-24

## 2023-08-25 PROBLEM — E78.2 MIXED HYPERLIPIDEMIA: Status: ACTIVE | Noted: 2018-10-24

## 2023-08-25 PROBLEM — F31.9 BIPOLAR DISORDER (HCC): Status: ACTIVE | Noted: 2018-11-14

## 2023-08-25 PROBLEM — G89.29 CHRONIC BILATERAL LOW BACK PAIN WITHOUT SCIATICA: Status: ACTIVE | Noted: 2018-11-28

## 2023-08-25 PROBLEM — L73.2 HYDRADENITIS: Status: ACTIVE | Noted: 2019-01-02

## 2023-08-25 PROBLEM — I73.9 PERIPHERAL ARTERY DISEASE (HCC): Status: ACTIVE | Noted: 2021-07-09

## 2023-08-25 PROBLEM — IMO0002 UNCONTROLLED TYPE 2 DIABETES MELLITUS WITH DIABETIC POLYNEUROPATHY, WITHOUT LONG-TERM CURRENT USE OF INSULIN: Status: ACTIVE | Noted: 2018-07-18

## 2023-08-25 PROBLEM — E11.9 TYPE 2 DIABETES MELLITUS (HCC): Status: ACTIVE | Noted: 2018-10-24

## 2023-08-25 PROBLEM — M54.50 CHRONIC BILATERAL LOW BACK PAIN WITHOUT SCIATICA: Status: ACTIVE | Noted: 2018-11-28

## 2023-08-25 PROBLEM — I10 ESSENTIAL HYPERTENSION: Status: ACTIVE | Noted: 2018-10-22

## 2023-08-25 PROBLEM — I70.209 SUPERFICIAL FEMORAL ARTERY OCCLUSION (HCC): Status: ACTIVE | Noted: 2021-08-22

## 2023-08-25 PROBLEM — A59.9 TRICHOMONIASIS: Status: ACTIVE | Noted: 2018-08-30

## 2023-08-25 PROBLEM — E11.43 DIABETIC AUTONOMIC NEUROPATHY ASSOCIATED WITH TYPE 2 DIABETES MELLITUS (HCC): Status: ACTIVE | Noted: 2018-04-10

## 2023-08-25 PROBLEM — G47.33 OBSTRUCTIVE SLEEP APNEA: Status: ACTIVE | Noted: 2018-08-30

## 2023-08-25 LAB
ALBUMIN SERPL BCP-MCNC: 3.3 G/DL (ref 3.5–5)
ALP SERPL-CCNC: 107 U/L (ref 34–104)
ALT SERPL W P-5'-P-CCNC: 22 U/L (ref 7–52)
ANION GAP SERPL CALCULATED.3IONS-SCNC: 5 MMOL/L
APTT PPP: 27 SECONDS (ref 23–37)
AST SERPL W P-5'-P-CCNC: 13 U/L (ref 13–39)
BASE EX.OXY STD BLDV CALC-SCNC: 93.4 % (ref 60–80)
BASE EXCESS BLDV CALC-SCNC: -4.9 MMOL/L
BASOPHILS # BLD AUTO: 0.02 THOUSANDS/ÂΜL (ref 0–0.1)
BASOPHILS NFR BLD AUTO: 0 % (ref 0–1)
BILIRUB SERPL-MCNC: 0.29 MG/DL (ref 0.2–1)
BUN SERPL-MCNC: 13 MG/DL (ref 5–25)
CALCIUM ALBUM COR SERPL-MCNC: 9 MG/DL (ref 8.3–10.1)
CALCIUM SERPL-MCNC: 8.4 MG/DL (ref 8.4–10.2)
CHLORIDE SERPL-SCNC: 104 MMOL/L (ref 96–108)
CO2 SERPL-SCNC: 25 MMOL/L (ref 21–32)
CREAT SERPL-MCNC: 0.46 MG/DL (ref 0.6–1.3)
EOSINOPHIL # BLD AUTO: 0 THOUSAND/ÂΜL (ref 0–0.61)
EOSINOPHIL NFR BLD AUTO: 0 % (ref 0–6)
ERYTHROCYTE [DISTWIDTH] IN BLOOD BY AUTOMATED COUNT: 19.7 % (ref 11.6–15.1)
EST. AVERAGE GLUCOSE BLD GHB EST-MCNC: 237 MG/DL
GFR SERPL CREATININE-BSD FRML MDRD: 128 ML/MIN/1.73SQ M
GLUCOSE SERPL-MCNC: 158 MG/DL (ref 65–140)
GLUCOSE SERPL-MCNC: 182 MG/DL (ref 65–140)
GLUCOSE SERPL-MCNC: 191 MG/DL (ref 65–140)
GLUCOSE SERPL-MCNC: 199 MG/DL (ref 65–140)
GLUCOSE SERPL-MCNC: 205 MG/DL (ref 65–140)
GLUCOSE SERPL-MCNC: 208 MG/DL (ref 65–140)
GLUCOSE SERPL-MCNC: 246 MG/DL (ref 65–140)
GLUCOSE SERPL-MCNC: 252 MG/DL (ref 65–140)
GLUCOSE SERPL-MCNC: 255 MG/DL (ref 65–140)
GLUCOSE SERPL-MCNC: 286 MG/DL (ref 65–140)
GLUCOSE SERPL-MCNC: 300 MG/DL (ref 65–140)
GLUCOSE SERPL-MCNC: 405 MG/DL (ref 65–140)
HBA1C MFR BLD: 9.9 %
HCO3 BLDV-SCNC: 18.5 MMOL/L (ref 24–30)
HCT VFR BLD AUTO: 31.3 % (ref 34.8–46.1)
HGB BLD-MCNC: 10.4 G/DL (ref 11.5–15.4)
IMM GRANULOCYTES # BLD AUTO: 0.11 THOUSAND/UL (ref 0–0.2)
IMM GRANULOCYTES NFR BLD AUTO: 1 % (ref 0–2)
INR PPP: 1.06 (ref 0.84–1.19)
LYMPHOCYTES # BLD AUTO: 1.15 THOUSANDS/ÂΜL (ref 0.6–4.47)
LYMPHOCYTES NFR BLD AUTO: 8 % (ref 14–44)
MAGNESIUM SERPL-MCNC: 1.9 MG/DL (ref 1.9–2.7)
MCH RBC QN AUTO: 21.6 PG (ref 26.8–34.3)
MCHC RBC AUTO-ENTMCNC: 33.2 G/DL (ref 31.4–37.4)
MCV RBC AUTO: 65 FL (ref 82–98)
MONOCYTES # BLD AUTO: 0.43 THOUSAND/ÂΜL (ref 0.17–1.22)
MONOCYTES NFR BLD AUTO: 3 % (ref 4–12)
NEUTROPHILS # BLD AUTO: 13.32 THOUSANDS/ÂΜL (ref 1.85–7.62)
NEUTS SEG NFR BLD AUTO: 88 % (ref 43–75)
NRBC BLD AUTO-RTO: 0 /100 WBCS
O2 CT BLDV-SCNC: 15.9 ML/DL
OSMOLALITY UR/SERPL-RTO: 312 MMOL/KG (ref 282–298)
PCO2 BLDV: 29.4 MM HG (ref 42–50)
PH BLDV: 7.42 [PH] (ref 7.3–7.4)
PLATELET # BLD AUTO: 435 THOUSANDS/UL (ref 149–390)
PMV BLD AUTO: 9.8 FL (ref 8.9–12.7)
PO2 BLDV: 129.3 MM HG (ref 35–45)
POTASSIUM SERPL-SCNC: 3.6 MMOL/L (ref 3.5–5.3)
PROT SERPL-MCNC: 6.3 G/DL (ref 6.4–8.4)
PROTHROMBIN TIME: 14.4 SECONDS (ref 11.6–14.5)
RBC # BLD AUTO: 4.82 MILLION/UL (ref 3.81–5.12)
SODIUM SERPL-SCNC: 134 MMOL/L (ref 135–147)
WBC # BLD AUTO: 15.03 THOUSAND/UL (ref 4.31–10.16)

## 2023-08-25 PROCEDURE — 99223 1ST HOSP IP/OBS HIGH 75: CPT | Performed by: INTERNAL MEDICINE

## 2023-08-25 PROCEDURE — 83036 HEMOGLOBIN GLYCOSYLATED A1C: CPT

## 2023-08-25 PROCEDURE — 80053 COMPREHEN METABOLIC PANEL: CPT

## 2023-08-25 PROCEDURE — 99232 SBSQ HOSP IP/OBS MODERATE 35: CPT | Performed by: INTERNAL MEDICINE

## 2023-08-25 PROCEDURE — 93005 ELECTROCARDIOGRAM TRACING: CPT

## 2023-08-25 PROCEDURE — 85025 COMPLETE CBC W/AUTO DIFF WBC: CPT

## 2023-08-25 PROCEDURE — 85730 THROMBOPLASTIN TIME PARTIAL: CPT

## 2023-08-25 PROCEDURE — 85610 PROTHROMBIN TIME: CPT

## 2023-08-25 PROCEDURE — 82805 BLOOD GASES W/O2 SATURATION: CPT

## 2023-08-25 PROCEDURE — 82948 REAGENT STRIP/BLOOD GLUCOSE: CPT

## 2023-08-25 PROCEDURE — 83735 ASSAY OF MAGNESIUM: CPT

## 2023-08-25 PROCEDURE — 99222 1ST HOSP IP/OBS MODERATE 55: CPT | Performed by: STUDENT IN AN ORGANIZED HEALTH CARE EDUCATION/TRAINING PROGRAM

## 2023-08-25 RX ORDER — HYDROMORPHONE HYDROCHLORIDE 2 MG/1
4 TABLET ORAL EVERY 4 HOURS PRN
Status: DISCONTINUED | OUTPATIENT
Start: 2023-08-25 | End: 2023-08-28 | Stop reason: HOSPADM

## 2023-08-25 RX ORDER — SENNOSIDES 8.6 MG
1 TABLET ORAL
Status: DISCONTINUED | OUTPATIENT
Start: 2023-08-25 | End: 2023-08-28 | Stop reason: HOSPADM

## 2023-08-25 RX ORDER — NICOTINE 21 MG/24HR
14 PATCH, TRANSDERMAL 24 HOURS TRANSDERMAL DAILY
Status: DISCONTINUED | OUTPATIENT
Start: 2023-08-25 | End: 2023-08-28 | Stop reason: HOSPADM

## 2023-08-25 RX ORDER — INSULIN LISPRO 100 [IU]/ML
2-12 INJECTION, SOLUTION INTRAVENOUS; SUBCUTANEOUS
Status: DISCONTINUED | OUTPATIENT
Start: 2023-08-25 | End: 2023-08-25

## 2023-08-25 RX ORDER — INSULIN LISPRO 100 [IU]/ML
20 INJECTION, SOLUTION INTRAVENOUS; SUBCUTANEOUS
Status: DISCONTINUED | OUTPATIENT
Start: 2023-08-25 | End: 2023-08-28 | Stop reason: HOSPADM

## 2023-08-25 RX ORDER — INSULIN LISPRO 100 [IU]/ML
25 INJECTION, SOLUTION INTRAVENOUS; SUBCUTANEOUS
Status: DISCONTINUED | OUTPATIENT
Start: 2023-08-25 | End: 2023-08-25

## 2023-08-25 RX ORDER — CYCLOBENZAPRINE HCL 10 MG
10 TABLET ORAL 3 TIMES DAILY PRN
Status: DISCONTINUED | OUTPATIENT
Start: 2023-08-25 | End: 2023-08-28 | Stop reason: HOSPADM

## 2023-08-25 RX ORDER — INSULIN LISPRO 100 [IU]/ML
2-12 INJECTION, SOLUTION INTRAVENOUS; SUBCUTANEOUS
Status: DISCONTINUED | OUTPATIENT
Start: 2023-08-26 | End: 2023-08-28 | Stop reason: HOSPADM

## 2023-08-25 RX ORDER — INSULIN GLARGINE 100 [IU]/ML
30 INJECTION, SOLUTION SUBCUTANEOUS EVERY 12 HOURS SCHEDULED
Status: DISCONTINUED | OUTPATIENT
Start: 2023-08-25 | End: 2023-08-28 | Stop reason: HOSPADM

## 2023-08-25 RX ORDER — OXYCODONE HYDROCHLORIDE 5 MG/1
5 TABLET ORAL EVERY 4 HOURS PRN
Status: DISCONTINUED | OUTPATIENT
Start: 2023-08-25 | End: 2023-08-28 | Stop reason: HOSPADM

## 2023-08-25 RX ORDER — INSULIN GLARGINE 100 [IU]/ML
35 INJECTION, SOLUTION SUBCUTANEOUS EVERY 12 HOURS SCHEDULED
Status: DISCONTINUED | OUTPATIENT
Start: 2023-08-25 | End: 2023-08-25

## 2023-08-25 RX ORDER — NICOTINE 21 MG/24HR
14 PATCH, TRANSDERMAL 24 HOURS TRANSDERMAL DAILY
Status: DISCONTINUED | OUTPATIENT
Start: 2023-08-26 | End: 2023-08-25

## 2023-08-25 RX ORDER — LACTULOSE 20 G/30ML
20 SOLUTION ORAL DAILY
Status: DISCONTINUED | OUTPATIENT
Start: 2023-08-26 | End: 2023-08-28 | Stop reason: HOSPADM

## 2023-08-25 RX ORDER — HYDROMORPHONE HYDROCHLORIDE 2 MG/1
4 TABLET ORAL EVERY 4 HOURS PRN
Status: DISCONTINUED | OUTPATIENT
Start: 2023-08-25 | End: 2023-08-25

## 2023-08-25 RX ADMIN — SODIUM CHLORIDE 20 UNITS/HR: 9 INJECTION, SOLUTION INTRAVENOUS at 03:39

## 2023-08-25 RX ADMIN — CYCLOBENZAPRINE 10 MG: 10 TABLET, FILM COATED ORAL at 02:21

## 2023-08-25 RX ADMIN — SODIUM CHLORIDE 18 UNITS/HR: 9 INJECTION, SOLUTION INTRAVENOUS at 00:20

## 2023-08-25 RX ADMIN — DIPHENHYDRAMINE HYDROCHLORIDE 25 MG: 50 INJECTION, SOLUTION INTRAMUSCULAR; INTRAVENOUS at 06:04

## 2023-08-25 RX ADMIN — METOCLOPRAMIDE 10 MG: 5 INJECTION, SOLUTION INTRAMUSCULAR; INTRAVENOUS at 06:04

## 2023-08-25 RX ADMIN — ATORVASTATIN CALCIUM 40 MG: 40 TABLET, FILM COATED ORAL at 17:16

## 2023-08-25 RX ADMIN — ACETAMINOPHEN 975 MG: 325 TABLET, FILM COATED ORAL at 12:18

## 2023-08-25 RX ADMIN — MAGNESIUM SULFATE HEPTAHYDRATE 2 G: 40 INJECTION, SOLUTION INTRAVENOUS at 08:58

## 2023-08-25 RX ADMIN — GABAPENTIN 300 MG: 300 CAPSULE ORAL at 22:13

## 2023-08-25 RX ADMIN — TOPIRAMATE 25 MG: 25 TABLET ORAL at 08:58

## 2023-08-25 RX ADMIN — SODIUM CHLORIDE 12 UNITS/HR: 9 INJECTION, SOLUTION INTRAVENOUS at 11:42

## 2023-08-25 RX ADMIN — HYDROMORPHONE HYDROCHLORIDE 4 MG: 2 TABLET ORAL at 09:00

## 2023-08-25 RX ADMIN — VALPROATE SODIUM 500 MG: 100 INJECTION, SOLUTION INTRAVENOUS at 13:24

## 2023-08-25 RX ADMIN — SENNOSIDES 8.6 MG: 8.6 TABLET, FILM COATED ORAL at 22:16

## 2023-08-25 RX ADMIN — SODIUM CHLORIDE, SODIUM LACTATE, POTASSIUM CHLORIDE, AND CALCIUM CHLORIDE 100 ML/HR: .6; .31; .03; .02 INJECTION, SOLUTION INTRAVENOUS at 02:24

## 2023-08-25 RX ADMIN — PRAZOSIN HYDROCHLORIDE 1 MG: 1 CAPSULE ORAL at 23:53

## 2023-08-25 RX ADMIN — ASPIRIN 81 MG 81 MG: 81 TABLET ORAL at 08:58

## 2023-08-25 RX ADMIN — INSULIN LISPRO 20 UNITS: 100 INJECTION, SOLUTION INTRAVENOUS; SUBCUTANEOUS at 18:14

## 2023-08-25 RX ADMIN — NICOTINE 14 MG: 14 PATCH, EXTENDED RELEASE TRANSDERMAL at 17:16

## 2023-08-25 RX ADMIN — FLUTICASONE FUROATE AND VILANTEROL TRIFENATATE 1 PUFF: 200; 25 POWDER RESPIRATORY (INHALATION) at 08:58

## 2023-08-25 RX ADMIN — GABAPENTIN 300 MG: 300 CAPSULE ORAL at 17:16

## 2023-08-25 RX ADMIN — KETOROLAC TROMETHAMINE 30 MG: 30 INJECTION, SOLUTION INTRAMUSCULAR; INTRAVENOUS at 04:17

## 2023-08-25 RX ADMIN — RIVAROXABAN 20 MG: 20 TABLET, FILM COATED ORAL at 08:58

## 2023-08-25 RX ADMIN — GABAPENTIN 300 MG: 300 CAPSULE ORAL at 08:58

## 2023-08-25 RX ADMIN — SODIUM CHLORIDE, SODIUM LACTATE, POTASSIUM CHLORIDE, AND CALCIUM CHLORIDE 100 ML/HR: .6; .31; .03; .02 INJECTION, SOLUTION INTRAVENOUS at 11:41

## 2023-08-25 RX ADMIN — DIPHENHYDRAMINE HYDROCHLORIDE 25 MG: 50 INJECTION, SOLUTION INTRAMUSCULAR; INTRAVENOUS at 13:24

## 2023-08-25 RX ADMIN — DULOXETINE HYDROCHLORIDE 40 MG: 20 CAPSULE, DELAYED RELEASE ORAL at 08:58

## 2023-08-25 RX ADMIN — METOCLOPRAMIDE 10 MG: 5 INJECTION, SOLUTION INTRAMUSCULAR; INTRAVENOUS at 13:24

## 2023-08-25 RX ADMIN — KETOROLAC TROMETHAMINE 30 MG: 30 INJECTION, SOLUTION INTRAMUSCULAR; INTRAVENOUS at 13:24

## 2023-08-25 RX ADMIN — Medication 30 MG: at 17:17

## 2023-08-25 RX ADMIN — TOPIRAMATE 25 MG: 25 TABLET ORAL at 17:16

## 2023-08-25 RX ADMIN — HYDROMORPHONE HYDROCHLORIDE 4 MG: 2 TABLET ORAL at 02:20

## 2023-08-25 RX ADMIN — INSULIN GLARGINE 30 UNITS: 100 INJECTION, SOLUTION SUBCUTANEOUS at 22:24

## 2023-08-25 NOTE — CONSULTS
Consultation - [facility] Endocrinology    Patient Information: Hortensia Bautista 40 y.o. female MRN: [id number]  Unit/Bed#: ICU 12 Encounter: [id number]  Admitting Physician: Yosef Nieves MD  PCP: Pati Caldwell MD  Date of Consultation:  08/25/23    ASSESSMENT:  Judith Patrick is a 40year old female with poorly controlled type 2 diabetes mellitus. As part of her regimen at home she reports taking Lantus 45 units qHS and a sliding scale insulin, which is not consistent with prior recommendations for Humalog 20 units with breakfast and lunch as 12 units with dinner. She likely has glucose toxicity in the setting of very high serum glucose as evidenced by sugars in the 300-400 range at home and will require higher doses of insulin until improvement in glycemic control. Suspect hyperglycemia may be precipitating patient's presenting complaint of headache. PLAN:    1. Type 2 diabetes mellitus  Start patient on Lantus 35 units q12 hours  Start Humalog 20 units three times daily with meal.   Correctional scale insulin at Algorithm 4  Continue accuchecks. Hypoglycemia protocol. She will need endocrinology follow up at discharge. 2.  Morbid obesity  Patient may benefit from a referral to bariatric surgery and possibly diabetes education. 3. Hypertension  Blood Pressure: 140/81    Recommend restarting patient's Lisinopril 20mg daily. Reason for consultation:   Uncontrolled type 2 diabetes mellitus    History of Present Illness:    Hortensia Bautista is a 40 y.o. female with past medical history of type 2 diabetes mellitus, obesity PAD status post left AKA on 3/26/2023 presents with severe headache described as frontal pressure radiating through the entire head. Initially treated as a migraine headache with a cocktail containing decadron, however she reports no improvement with treatment.  Imaging demonstrated hypoattenuation left lentiform nucleus and corresponding left frontal horn ex vacuo dilatation concerning for chronic infarction but new since prior 2020 study and neurology is currently on board. Endocrinology is consulted for management of poorly controlled type 2 diabetes mellitus. Patient reports that she was initially diagnosed at the age of 13, does not recall whether she underwent antibody testing. She reports multiple hosptalizations where her sugars were noted to be >500. Em Hernandez also notes that she is often experiencing polyuria, polydipsia, fatigue, blurry vision. She utilizes a glucometer at home but checks her sugars only when she remembers. She reports morning glucose of 300-400, higher before meals. Her home regimen includes Metformin 8416CO BID, Trulicity 7.6OS weekly, Lantus 45 units qhs and a humaLog sliding scale. She does not follow up with podiatry or ophthalmology, however she does see her cardiologist and vascular surgeon regularly. Review of Systems:    Review of Systems   Constitutional: Positive for fatigue. Negative for activity change, appetite change, fever and unexpected weight change. HENT: Negative for congestion, postnasal drip, rhinorrhea, sinus pressure, sore throat and trouble swallowing. Eyes: Negative for photophobia and visual disturbance. Respiratory: Negative for cough, chest tightness, shortness of breath and wheezing. Cardiovascular: Negative for chest pain, palpitations and leg swelling. Gastrointestinal: Negative for abdominal distention, abdominal pain, constipation, diarrhea, nausea and vomiting. Endocrine: Positive for polydipsia and polyuria. Genitourinary: Positive for frequency. Negative for difficulty urinating, dysuria and hematuria. Musculoskeletal: Negative for arthralgias, back pain and myalgias. Above-knee amputation   Skin: Negative for color change, pallor and rash. Neurological: Positive for headaches. Negative for dizziness, facial asymmetry, weakness, light-headedness and numbness. Psychiatric/Behavioral: Negative for agitation, behavioral problems, confusion and dysphoric mood. All other systems reviewed and are negative. Past Medical and Surgical History:     Past Medical History:   Diagnosis Date   • Asthma    • Atherosclerosis    • Bipolar 1 disorder ([address])    • COPD (chronic obstructive pulmonary disease) ([address])    • Depression    • Diabetes mellitus ([address])    • Hypertension    • Psychiatric disorder     cutting history   • PTSD (post-traumatic stress disorder)    • Schizoaffective disorder ([address])    • Tendonitis        Past Surgical History:   Procedure Laterality Date   • AMPUTATION ABOVE KNEE (AKA) Left 3/29/2023    Procedure: AMPUTATION ABOVE KNEE (AKA); Surgeon: Melvin Pino MD;  Location: [facility];  Service: Vascular   • BYPASS FEMORAL-POPLITEAL Left 2021    Procedure: Left Common Femoral Below Knee to Popliteal Bypass with Insitu GSV graft. Left Lower Extremity Angiogram;  Surgeon: Melvin Pino MD;  Location: [facility];  Service: Vascular   •  SECTION     • EAR SURGERY     • IR LOWER EXTREMITY ANGIOGRAM  2021   • IR LOWER EXTREMITY ANGIOGRAM  2021   • MA SLCTV CATHJ 3RD+ ORD SLCTV ABDL PEL/LXTR [facility] Left 3/24/2023    Procedure: Left leg angiogram;  Surgeon: Akash Jones DO;  Location: [facility];  Service: Vascular   • TUBAL LIGATION         Meds/Allergies:    PTA meds:   Prior to Admission Medications   Prescriptions Last Dose Informant Patient Reported? Taking? Advair -21 MCG/ACT inhaler   No No   Sig: Inhale 2 puffs 2 (two) times a day Rinse mouth after use   Alcohol Swabs (Pharmacist Choice Alcohol) PADS  Outside Facility (Specify), Self No No   Sig: USE 3-4 TIMES AS DIRECTED.    Blood Pressure Monitoring (Blood Pressure Monitor Automat) [address] (Specify), Self No No   Sig: Use Daily as Directed   Comfort EZ Pen Needles 33G X 4 MM MISC  Outside Facility (Specify), Self No No   Sig: USE AS DIRECTED   DULoxetine (CYMBALTA) 20 mg capsule   No No   Sig: Take 2 capsules (40 mg total) by mouth daily   Global Inject Ease Lancets 30G MISC  Outside Facility (Specify), Self No No   Sig: USE 3 (THREE) TIMES A DAY   HYDROmorphone (DILAUDID) 2 mg tablet  Outside Facility (Specify), Self No No   Sig: Take 1 (2mg) to 2 tabs (4mg) by mouth every 4 hours as needed for moderate to severe pain   Insulin Pen Needle (Comfort EZ Pen Needles) 32G X 4 MM MIS  Outside Facility (Specify), Self No No   Sig: Inject under the skin 4 (four) times a day (before meals and at bedtime)   OneTouch Ultra test strip   Yes No   Sig: TEST TWICE DAILY ()   QUEtiapine (SEROquel) 200 mg tablet Not Taking Outside Facility (Specify), Self Yes No   Sig: Take by mouth daily at bedtime   Patient not taking: Reported on 3/47/8188   Trulicity 1.5 IX/6.2TK injection  Outside Facility (Specify), Self Yes No   Sig: UNCERTAIN OF DOSE   albuterol (PROVENTIL HFA,VENTOLIN HFA) 90 mcg/act inhaler   No No   Sig: Inhale 2 puffs every 4 (four) hours as needed for wheezing or shortness of breath   aspirin 81 mg chewable tablet   No No   Sig: Chew 1 tablet (81 mg total) daily   atorvastatin (LIPITOR) 40 mg tablet   No No   Sig: Take 1 tablet (40 mg total) by mouth daily with dinner   gabapentin (NEURONTIN) 300 mg capsule  Outside Facility (Specify), Self No No   Sig: Take 1 capsule (300 mg total) by mouth 3 (three) times a day   hydrOXYzine HCL (ATARAX) 50 mg tablet  Outside Facility (Specify), Self No No   Sig: Take 1 tablet (50 mg total) by mouth every 8 (eight) hours as needed for itching or anxiety   insulin glargine (LANTUS) 100 units/mL subcutaneous injection  Outside Facility (Specify), Self No No   Sig: Inject 45 Units under the skin daily at bedtime   insulin lispro (HumaLOG) 100 units/mL injection  Outside Facility (Specify), Self No No   Sig: Inject 2-12 Units under the skin 3 (three) times a day before meals   Patient not taking: Reported on 8/16/2023   insulin lispro (HumaLOG) 100 units/mL injection Not Taking Outside Facility (Specify), Self No No   Sig: Inject 20 Units under the skin daily with breakfast Do not start before April 20, 2023.    Patient not taking: Reported on 8/24/2023   lisinopril (ZESTRIL) 20 mg tablet   No No   Sig: Take 1 tablet (20 mg total) by mouth daily   metFORMIN (GLUCOPHAGE) 1000 MG tablet  Outside Facility (Specify), Self No No   Sig: TAKE ONE (1) TABLET BY MOUTH TWICE DAILY WITH FOOD   methocarbamol (ROBAXIN) 750 mg tablet Not Taking Outside Facility (Specify), Self No No   Sig: Take 1 tablet (750 mg total) by mouth every 6 (six) hours as needed for muscle spasms   Patient not taking: Reported on 8/16/2023   nicotine (NICODERM CQ) 21 mg/24 hr TD 24 hr patch   No No   Sig: Place 1 patch on the skin over 24 hours daily   Patient not taking: Reported on 8/16/2023   polyethylene glycol (MIRALAX) 17 g packet  Outside Facility (Specify), Self No No   Sig: Take 17 g by mouth daily   Patient not taking: Reported on 8/16/2023   prazosin (MINIPRESS) 1 mg capsule   No No   Sig: Take 1 capsule (1 mg total) by mouth daily at bedtime   rivaroxaban (XARELTO) 20 mg tablet   No No   Sig: Take 1 tablet (20 mg total) by mouth daily with breakfast   simethicone (MYLICON) 80 mg chewable tablet Not Taking Outside Facility (Specify), Self No No   Sig: Chew 1 tablet (80 mg total) every 6 (six) hours as needed for flatulence   Patient not taking: Reported on 8/16/2023   topiramate (TOPAMAX) 25 mg tablet  Outside Facility (Specify), Self No No   Sig: Take 1 tablet (25 mg total) by mouth 2 (two) times a day      Facility-Administered Medications: None       Allergies: No Known Allergies  History:     Marital Status: /Civil Union   Occupation:   Substance Use History:   Social History     Substance and Sexual Activity   Alcohol Use Not Currently    Comment: not currently     Social History     Tobacco Use   Smoking Status Former   • Packs/day: 1.00   • Years: 20.00   • Total pack years: 20.00   • Types: Cigarettes   • Quit date: 3/24/2023   • Years since quittin.4   Smokeless Tobacco Former   • Quit date: 2022     Social History     Substance and Sexual Activity   Drug Use Not Currently   • Types: Cocaine, Marijuana, "Crack" cocaine    Comment: 1 years clean from crack cocaine since        Family History:    Family History   Problem Relation Age of Onset   • Hypertension Mother    • Diabetes Mother    • HIV Mother    • Heart disease Mother    • No Known Problems Father        Physical Exam:     Vitals:   Blood Pressure: 126/74 (23 0700)  Pulse: 84 (23 0700)  Temperature: 97.9 °F (36.6 °C) (23)  Temp Source: Axillary (23)  Respirations: 20 (23)  Weight - Scale: 128 kg (281 lb 4.9 oz) (23 06)  SpO2: 98 % (23)    Physical Exam  Vitals and nursing note reviewed. Constitutional:       General: She is not in acute distress. Appearance: Normal appearance. She is morbidly obese. She is not ill-appearing, toxic-appearing or diaphoretic. HENT:      Head: Normocephalic and atraumatic. Nose: Nose normal.      Mouth/Throat:      Mouth: Mucous membranes are moist.      Pharynx: Oropharynx is clear. Eyes:      General: No scleral icterus. Right eye: No discharge. Left eye: No discharge. Extraocular Movements: Extraocular movements intact. Conjunctiva/sclera: Conjunctivae normal.   Cardiovascular:      Rate and Rhythm: Normal rate and regular rhythm. Pulses: Normal pulses. Heart sounds: Normal heart sounds. No murmur heard. Pulmonary:      Effort: Pulmonary effort is normal. No respiratory distress. Breath sounds: Normal breath sounds. No wheezing, rhonchi or rales. Abdominal:      General: Abdomen is flat. Bowel sounds are normal. There is no distension. Palpations: Abdomen is soft. Tenderness: There is no abdominal tenderness.  There is no guarding or rebound. Musculoskeletal:      Cervical back: Normal range of motion and neck supple. Right lower leg: No edema. Comments: LLE with AKA with well-healed stump incision site. Skin:     General: Skin is warm and dry. Coloration: Skin is not jaundiced or pale. Neurological:      Mental Status: She is alert and oriented to person, place, and time. Mental status is at baseline. Psychiatric:         Mood and Affect: Mood normal.         Behavior: Behavior normal.         Thought Content: Thought content normal.         Judgment: Judgment normal.              Lab and Imaging Results: I have personally reviewed pertinent reports. and I have personally reviewed pertinent films in PACS    Results from last 7 days   Lab Units 08/25/23  0611   WBC Thousand/uL 15.03*   HEMOGLOBIN g/dL 10.4*   HEMATOCRIT % 31.3*   PLATELETS Thousands/uL 435*   NEUTROS PCT % 88*   LYMPHS PCT % 8*   MONOS PCT % 3*   EOS PCT % 0     Results from last 7 days   Lab Units 08/25/23  0611   POTASSIUM mmol/L 3.6   CHLORIDE mmol/L 104   CO2 mmol/L 25   BUN mg/dL 13   CREATININE mg/dL 0.46*   CALCIUM mg/dL 8.4   ALK PHOS U/L 107*   ALT U/L 22   AST U/L 13     Results from last 7 days   Lab Units 08/25/23  0611   INR  1.06     POC Glucose (mg/dl)   Date Value   08/25/2023 182 (H)   08/25/2023 199 (H)   08/25/2023 300 (H)   08/25/2023 405 (H)   08/24/2023 >500 (HH)   08/24/2023 >500 (HH)   08/24/2023 >500 (HH)   08/24/2023 >500 ([address] [place] [address],[address])   08/24/2023 82   04/19/2023 173 (H)     CT head without contrast    Result Date: 8/24/2023  Impression: No acute intracranial abnormality. Hypoattenuation left lentiform nucleus and corresponding left frontal horn ex vacuo dilatation concerning for chronic infarction but new since prior 2020 study. Consider follow-up nonemergent MRI of the brain especially  given the patient's age. The study was marked in EPIC for significant notification.  Workstation performed: [id number]     XR chest 1 view portable    Result Date: 8/24/2023  Impression: No acute cardiopulmonary disease. Workstation performed: [id number]       XR chest 1 view portable    Result Date: 8/24/2023  Impression No acute cardiopulmonary disease. Workstation performed: [id number]          ** Please Note: Dragon 360 Dictation voice to text software may have been used in the creation of this document.  **

## 2023-08-25 NOTE — ASSESSMENT & PLAN NOTE
· Patient reported experiencing chest tightness/chest pain  · Cardiac work-up in ED was negative for any signs of ischemia/no elevation in troponin, EKG NSR  · Sees cardiology as outpatient  · Reports having a history of palpitations  · Per cardiology patient will have 1 week of Zio patch and echocardiogram  · We will continue to monitor off telemetry as patient is currently wearing a monitor at this time  · Reassess in a.m.

## 2023-08-25 NOTE — ASSESSMENT & PLAN NOTE
40 y.o.  female insulin-dependent diabetes, PAD s/p LLE bypass and stenting 3/1713 complicated by stent thrombosis, left-sided above-knee amputation, COPD, BMI 53 who presents with pressure headache that started yesterday. Her glucose was found to be uncontrolled with Max 615. Impression: patient's elevated blood pressure and glucose can be contributing to her headache. She has hx of migraine controlled with Topamax. Currently headache has some migrainosus features like photophobia and nausea. Plan:  -Discussed plan with neurology attending, Dr. Marquise mcguire  -Will trial Depacon 500mg IV x1 and a round of migraine cocktail: Reglan, Benadryl, and Toradol  -If patient's head pressure is improved with valproic acid, she can be discharged from Neurology standpoint if medically cleared. Will defer to Primary team.  -Patient can be discharged home with Depakote taper 250 mg twice daily for 3 days and 250 mg daily for 2 days. -CTH finding is chronic and patient did not have any neurological symptoms or deficits  -Avoid opioid pain meds for head pressure since patient will have worsening rebound headache. Explained to the patient that Dilaudid is not the treatment for the headache and can cause worsening headache. She understood.  -Blood glucose and blood pressure control   -Rest of medical management per Primary team  -No further management from neurology.  Please call with questions or concerns    She will need to follow up with Headache clinic in 4-6 weeks

## 2023-08-25 NOTE — PLAN OF CARE
Problem: MOBILITY - ADULT  Goal: Maintain or return to baseline ADL function  Description: INTERVENTIONS:  -  Assess patient's ability to carry out ADLs; assess patient's baseline for ADL function and identify physical deficits which impact ability to perform ADLs (bathing, care of mouth/teeth, toileting, grooming, dressing, etc.)  - Assess/evaluate cause of self-care deficits   - Assess range of motion  - Assess patient's mobility; develop plan if impaired  - Assess patient's need for assistive devices and provide as appropriate  - Encourage maximum independence but intervene and supervise when necessary  - Involve family in performance of ADLs  - Assess for home care needs following discharge   - Consider OT consult to assist with ADL evaluation and planning for discharge  - Provide patient education as appropriate  Outcome: Progressing  Goal: Maintains/Returns to pre admission functional level  Description: INTERVENTIONS:  - Perform BMAT or MOVE assessment daily.   - Set and communicate daily mobility goal to care team and patient/family/caregiver. - Collaborate with rehabilitation services on mobility goals if consulted  - Reposition patient every 2hours.   - Record patient progress and toleration of activity level   Outcome: Progressing     Problem: Prexisting or High Potential for Compromised Skin Integrity  Goal: Skin integrity is maintained or improved  Description: INTERVENTIONS:  - Identify patients at risk for skin breakdown  - Assess and monitor skin integrity  - Assess and monitor nutrition and hydration status  - Monitor labs   - Assess for incontinence   - Turn and reposition patient  - Assist with mobility/ambulation  - Relieve pressure over bony prominences  - Avoid friction and shearing  - Provide appropriate hygiene as needed including keeping skin clean and dry  - Evaluate need for skin moisturizer/barrier cream  - Collaborate with interdisciplinary team   - Patient/family teaching  - Consider wound care consult   Outcome: Progressing

## 2023-08-25 NOTE — ASSESSMENT & PLAN NOTE
Continue Topamax and prazosin  Pt self-discontinued seroquel  Continue outpatient follow up with psychiatrist

## 2023-08-25 NOTE — QUICK NOTE
Patient seen and examined regarding ongoing headache. Patient reports pain around her eyes b/l and across forehead. Described as a lot of pressure, squeezing sensation in this region. Pain 10/10 in severity. Denies any visual changes. Reports remote history of migraines but notes her migraines are typically unilateral and hasn't had any in years. Symptoms also not meeting the strict definition of a migraine at this time given absence of aura, nausea, vomiting or combination of photophobia with phonophobia (per international headache society classification). She does report some isolated photophobia only. Reports migraine cocktail not helping. Patient also noted to have BG on fingerstick of >500. Subsequently ordered labs to work up DKA vs HHS. Discussed with patient that HA likely 2/2 to uncontrolled sugar at this time but patient notes that headache severity not changing when her prior BG readings were in the 80's earlier prior to dinner. Follow up neuro exam grossly unremarkable with the exception of the patient having difficulty with eye tracking from left to right with horizontal finger tracking. Transient rotatory nystagmus noted. Patient eventually able to track after repeated commands. FOV normal. Consideration for MRI f/u deferred to day team if no improvement in symptoms and given CT image findings. Not comfortable initiating a triptan at this time given BP and remote possibility of stroke. Would opt to add Flexeril as adjunct and d/c opioids. Patient with a BG jump from 82 to >500 after dinner. Patient reportedly eating a lunchbox, pudding and pretzels per nursing. Subsequently placed on carb controlled diet.

## 2023-08-25 NOTE — ASSESSMENT & PLAN NOTE
Body mass index is 53.15 kg/m². Opportunity for lifestyle discussion this AM. Discussed the role lifestyle has played in overall health thus far. Emphasized focusing on a few things at a time, starting with avoiding drinking calories and increasing natural fiber intake, discussed what this would look like in pt's life. SDH limitations discussed with solution-oriented framing practiced. Additional discussion to be ongoing ideally with clinic follow up.

## 2023-08-25 NOTE — ASSESSMENT & PLAN NOTE
Lab Results   Component Value Date    HGBA1C 9.8 (A) 12/20/2022       Recent Labs     08/24/23 1953 08/24/23 2135 08/24/23 2141   POCGLU 82 >500* >500*       Blood Sugar Average: Last 72 hrs:  (P) 82     · Poorly controlled blood sugars per chart review  · Home regimen with metformin 1000 mg daily and Trulicity 1.5 mg q. weekly, insulin glargine 45 units nightly, and sliding scale correction  · Patient was given Decadron during ED encounter    Plan:  · Insulin glargine 45 units nightly  · Continue gabapentin for neuropathy  · Hypoglycemic protocol  · Consider endocrinology consult  · Hold metformin and Trulicity  · Sliding scale insulin algorithm #2  · Accu-Cheks  · Blood glucose goal less than 180  · Diabetic diet

## 2023-08-25 NOTE — ASSESSMENT & PLAN NOTE
· POA 73-year-old female patient presented to [facility] with headache that started yesterday night that progressively worsened into this morning  · Imaging with CT head showed no acute intracranial normalities; hypoattenuation left lentiform nucleus and corresponding left frontal horn and tobacco dilatation concerning for chronic infarction but new since prior 2020 study  · ED physician discussed case with neurology who recommended potential for MRI of the brain  · Patient has a history of migraines however she reports being unable to explain if the symptoms are different from prior  · Patient was given Decadron, Benadryl, magnesium sulfate in ED for which she reports some relief  · Examination did not find any neurological deficits  · Plan:  · Continue migraine prophylaxis  · Blood glucose control as below  · Avoid overcorrection of blood pressure  · Follow-up a.m. labs  · Consider MRI if the patient's headache continues

## 2023-08-25 NOTE — H&P
[facility]  H&P  Name: Juliano Pacheco 40 y.o. female I MRN: [id number]  Unit/Bed#: S -01 I Date of Admission: 8/24/2023   Date of Service: 8/24/2023 I Hospital Day: 0      Assessment/Plan   Headache  Assessment & Plan  · POA 71-year-old female patient presented to [facility]ED with headache that started yesterday night that progressively worsened into this morning  · Imaging with CT head showed no acute intracranial normalities; hypoattenuation left lentiform nucleus and corresponding left frontal horn and tobacco dilatation concerning for chronic infarction but new since prior 2020 study  · ED physician discussed case with neurology who recommended potential for MRI of the brain  · Patient has a history of migraines however she reports being unable to explain if the symptoms are different from prior  · Patient was given Decadron, Benadryl, magnesium sulfate in ED for which she reports some relief  · Examination did not find any neurological deficits  · Plan:  · Continue migraine prophylaxis  · Blood glucose control as below  · Avoid overcorrection of blood pressure  · Follow-up a.m. labs  · Consider MRI if the patient's headache continues    Atypical chest pain  Assessment & Plan  · Patient reported experiencing chest tightness/chest pain  · Cardiac work-up in ED was negative for any signs of ischemia/no elevation in troponin, EKG NSR  · Sees cardiology as outpatient  · Reports having a history of palpitations  · Per cardiology patient will have 1 week of Zio patch and echocardiogram  · We will continue to monitor off telemetry as patient is currently wearing a monitor at this time  · Reassess in a.m. S/P AKA (above knee amputation) unilateral, left (HCC)  Assessment & Plan  · Denies any pain on her stump  · Continue Xarelto and statin    Morbid obesity  Assessment & Plan  Body mass index is 53.15 kg/m².   We will need post discharge weight management/lifestyle modification discussion    Bipolar disorder  Assessment & Plan  Continue Topamax    COPD (chronic obstructive pulmonary disease) ([facility])  Assessment & Plan  · Continue albuterol inhaler and fluticasone-vilanterol 200-25 mcg/actuation 1 puff  · Goal SPO2 greater than 88% while asleep greater than 90% while awake  · Continue to monitor vital signs    Insulin-dependent diabetes mellitus with neuropathy  Assessment & Plan  Lab Results   Component Value Date    HGBA1C 9.8 (A) 12/20/2022       Recent Labs     08/24/23 1953 08/24/23 2135 08/24/23 2141   POCGLU 82 >500* >500*       Blood Sugar Average: Last 72 hrs:  (P) 82     · Poorly controlled blood sugars per chart review  · Home regimen with metformin 1000 mg daily and Trulicity 1.5 mg q. weekly, insulin glargine 45 units nightly, and sliding scale correction  · Patient was given Decadron during ED encounter    Plan:  · Insulin glargine 45 units nightly  · Continue gabapentin for neuropathy  · Hypoglycemic protocol  · Consider endocrinology consult  · Hold metformin and Trulicity  · Sliding scale insulin algorithm #2  · Accu-Cheks  · Blood glucose goal less than 180  · Diabetic diet      Essential hypertension  Assessment & Plan  · Patient blood pressure upon initial evaluation was elevated 160/110  · Home regimen with Minipress 1 mg p.o. nightly and lisinopril    Plan:  · Continue home regimen  · Optimize pain control  · Continue monitoring vital signs/BP  · Adjust regimen accordingly       VTE Prophylaxis: Rivaroxaban (Xarelto)  / sequential compression device   Code Status: Full Code/Level-1  POLST: There is no POLST form on file for this patient (pre-hospital)    Anticipated Length of Stay:  Patient will be admitted on an Observation basis with an anticipated length of stay of  Less than 2 midnights.    Justification for Hospital Stay: Headache    Chief Complaint:   Headache    History of Present Illness:    Yaakov Boyd is a 40 y.o. female with a past medical history significant for T2DM COPD, morbid obesity, PAD status post LLE bypass and stenting 99/2785 complicated by stent thrombosis, left side AKA, who presents with a complaint headache. Patient reports that she initially started feeling headache late yesterday evening and reports that it progressively worsened into this morning. She reports that she also had elevated BP at her house this morning (168/110) for which her home health care nurse mentioned that the patient should go to the ED for further evaluation. She describes her headache as squeezing starting at the frontal aspect of her forehead, that radiates throughout the entire head. She denies any relief from position changes, denies taking any OTC pain medication at home. In ED patient's work-up with CT head showed possible chronic infarction in the left lentiform nucleus suggested by hypoattenuation, also associated ex vacuo dilatation in the anterior horn of the left lateral ventricle. Lab work revealed leukocytosis at 10.88, decreased MCV at 65, thrombocytosis at 399, and her UA showed elevated specific gravity, leukocytes, glucose greater than 1000, ketones at 80. Patient was given Decadron, Benadryl, Dilaudid. Reports that her headache reduced slowly but was still experiencing significant amount of pressure around her forehead. She denies any other symptoms at this time. Review of Systems:    Review of Systems   Constitutional: Positive for activity change and fatigue. HENT: Negative. Respiratory: Positive for chest tightness. Negative for cough, shortness of breath and wheezing. Cardiovascular: Positive for chest pain. Negative for palpitations and leg swelling. Gastrointestinal: Negative for abdominal pain, constipation, diarrhea, nausea and vomiting. Genitourinary: Negative for difficulty urinating and dysuria. Musculoskeletal: Negative for back pain and neck pain. Neurological: Positive for light-headedness and headaches. Negative for dizziness, tremors, seizures, syncope, facial asymmetry, speech difficulty, weakness and numbness. Psychiatric/Behavioral: Negative for dysphoric mood. The patient is not nervous/anxious. Past Medical and Surgical History:     Past Medical History:   Diagnosis Date   • Asthma    • Atherosclerosis    • Bipolar 1 disorder ([address])    • COPD (chronic obstructive pulmonary disease) ([address])    • Depression    • Diabetes mellitus ([address])    • Hypertension    • Psychiatric disorder     cutting history   • PTSD (post-traumatic stress disorder)    • Schizoaffective disorder ([address])    • Tendonitis        Past Surgical History:   Procedure Laterality Date   • AMPUTATION ABOVE KNEE (AKA) Left 3/29/2023    Procedure: AMPUTATION ABOVE KNEE (AKA); Surgeon: Dwain Ozuna MD;  Location: [facility];  Service: Vascular   • BYPASS FEMORAL-POPLITEAL Left 2021    Procedure: Left Common Femoral Below Knee to Popliteal Bypass with Insitu GSV graft. Left Lower Extremity Angiogram;  Surgeon: Dwain Ozuna MD;  Location: [facility];  Service: Vascular   •  SECTION     • EAR SURGERY     • IR LOWER EXTREMITY ANGIOGRAM  2021   • IR LOWER EXTREMITY ANGIOGRAM  2021   • CA SLCTV CATHJ 3RD+ ORD SLCTV ABDL PEL/LXTR [facility] Left 3/24/2023    Procedure: Left leg angiogram;  Surgeon: Hernan West DO;  Location: [facility];  Service: Vascular   • TUBAL LIGATION         Meds/Allergies:    Prior to Admission medications    Medication Sig Start Date End Date Taking?  Authorizing Provider   Advair -21 MCG/ACT inhaler Inhale 2 puffs 2 (two) times a day Rinse mouth after use 23   Jarek Coleman MD   albuterol (PROVENTIL HFA,VENTOLIN HFA) 90 mcg/act inhaler Inhale 2 puffs every 4 (four) hours as needed for wheezing or shortness of breath 23   Jarek Coleman MD   Alcohol Swabs (Pharmacist Choice Alcohol) PADS USE 3-4 TIMES AS DIRECTED. 22   Puenet Meng MD   aspirin 81 mg chewable tablet Chew 1 tablet (81 mg total) daily 8/9/23   Jelani Church MD   atorvastatin (LIPITOR) 40 mg tablet Take 1 tablet (40 mg total) by mouth daily with dinner 8/9/23   Jelani Church MD   Blood Pressure Monitoring (Blood Pressure Monitor Automat) KATLYN Use Daily as Directed 3/5/21   Enrique Johnson, DO   Comfort EZ Pen Needles 33G X 4 MM MISC USE AS DIRECTED 5/10/23   Jelani Church MD   DULoxetine (CYMBALTA) 20 mg capsule Take 2 capsules (40 mg total) by mouth daily 7/10/23 10/8/23  Deannie Mcardle,    gabapentin (NEURONTIN) 300 mg capsule Take 1 capsule (300 mg total) by mouth 3 (three) times a day 4/19/23   Angelita Lobo DO   Global Inject Ease Lancets 30G MISC USE 3 (THREE) TIMES A DAY 2/22/22   Dalia Marin, DO   HYDROmorphone (DILAUDID) 2 mg tablet Take 1 (2mg) to 2 tabs (4mg) by mouth every 4 hours as needed for moderate to severe pain 4/19/23   Angelita Lobo DO   hydrOXYzine HCL (ATARAX) 50 mg tablet Take 1 tablet (50 mg total) by mouth every 8 (eight) hours as needed for itching or anxiety 4/19/23   Angelita Lobo DO   insulin glargine (LANTUS) 100 units/mL subcutaneous injection Inject 45 Units under the skin daily at bedtime 4/19/23   Angelita Lobo DO   insulin lispro (HumaLOG) 100 units/mL injection Inject 2-12 Units under the skin 3 (three) times a day before meals  Patient not taking: Reported on 8/16/2023 4/19/23   Angelita Lobo DO   insulin lispro (HumaLOG) 100 units/mL injection Inject 20 Units under the skin daily with breakfast Do not start before April 20, 2023.   Patient not taking: Reported on 8/24/2023 4/20/23   Angelita Lobo DO   Insulin Pen Needle (Comfort EZ Pen Needles) 32G X 4 MM MISC Inject under the skin 4 (four) times a day (before meals and at bedtime) 1/24/23   Richard Barraza MD   lisinopril (ZESTRIL) 20 mg tablet Take 1 tablet (20 mg total) by mouth daily 8/9/23   Jelani Church MD   metFORMIN (GLUCOPHAGE) 1000 MG tablet TAKE ONE (1) TABLET BY MOUTH TWICE DAILY WITH FOOD 5/9/23   Chelsi Aparicio MD   methocarbamol (ROBAXIN) 750 mg tablet Take 1 tablet (750 mg total) by mouth every 6 (six) hours as needed for muscle spasms  Patient not taking: Reported on 8/16/2023 4/19/23   Te Nuñez DO   nicotine (NICODERM CQ) 21 mg/24 hr TD 24 hr patch Place 1 patch on the skin over 24 hours daily  Patient not taking: Reported on 8/16/2023 7/10/23   Arlington Persons, DO   OneTouch Ultra test strip TEST TWICE DAILY () 7/27/23   Historical Provider, MD   polyethylene glycol (MIRALAX) 17 g packet Take 17 g by mouth daily  Patient not taking: Reported on 8/16/2023 4/19/23   Te Nuñez DO   prazosin (MINIPRESS) 1 mg capsule Take 1 capsule (1 mg total) by mouth daily at bedtime 8/9/23   Chelsi Aparicio MD   QUEtiapine (SEROquel) 200 mg tablet Take by mouth daily at bedtime  Patient not taking: Reported on 8/24/2023 3/8/23   Historical Provider, MD   rivaroxaban (XARELTO) 20 mg tablet Take 1 tablet (20 mg total) by mouth daily with breakfast 8/10/23   Candida Hitchcock DO   simethicone (MYLICON) 80 mg chewable tablet Chew 1 tablet (80 mg total) every 6 (six) hours as needed for flatulence  Patient not taking: Reported on 8/16/2023 4/19/23   Te Nuñez DO   topiramate (TOPAMAX) 25 mg tablet Take 1 tablet (25 mg total) by mouth 2 (two) times a day 4/19/23   Te Nuñez DO   Trulicity 1.5 ET/6.6BJ injection UNCERTAIN OF DOSE 2/67/58   Historical Provider, MD   bisacodyl (DULCOLAX) 10 mg suppository Insert 1 suppository (10 mg total) into the rectum daily as needed for constipation  Patient not taking: Reported on 6/27/2023 4/19/23 8/24/23  Te Nuñez DO   Blood Glucose Monitoring Suppl (ONE TOUCH ULTRA 2) w/Device KIT BY DEVICE ROUTE 2 (TWO) TIMES A DAY CHECK BLOOD SUGARS AND RECORD VALUE AND TIME OF DAY TWICE A DAY 7/24/23 8/24/23  Historical Provider, MD   Diclofenac Sodium (VOLTAREN) 1 % Apply 2 g topically 4 (four) times a day For pain to affected area 7/12/23 8/24/23  Darien Encinas, DO   Insulin Glargine Solostar 100 UNIT/ML SOPN INJECT 45 UNITS SUBCUTANEOULY ONE TIME A DAY 7/7/23 8/24/23  Historical Provider, MD   insulin lispro (HumaLOG) 100 units/mL injection Inject 2-12 Units under the skin daily at bedtime  Patient not taking: Reported on 8/16/2023 4/19/23 8/24/23  Keshav Bullocks, DO   insulin lispro (HumaLOG) 100 units/mL injection Inject 12 Units under the skin daily with dinner  Patient not taking: Reported on 8/16/2023 4/19/23 8/24/23  Keshav Bullocks, DO   insulin lispro (HumaLOG) 100 units/mL injection Inject 20 Units under the skin daily with lunch  Patient not taking: Reported on 8/16/2023 4/19/23 8/24/23  Keshav Bullocks, DO   Lancet Devices (Lancing Device) [place] on the skin if needed (use as directed w/ glucometer to check blood sugar) Use as directed 7/10/23 8/24/23  Princess Monk, DO   lidocaine (LIDODERM) 5 % Apply 1 patch topically over 12 hours every 24 hours Remove & Discard patch within 12 hours or as directed by MD  Patient not taking: Reported on 8/16/2023 7/10/23 8/24/23  Princess Lacho DO   melatonin 3 mg Take 2 tablets (6 mg total) by mouth daily at bedtime  Patient not taking: Reported on 8/16/2023 7/10/23 8/24/23  Princess Lacho DO   nicotine polacrilex (NICORETTE) 2 mg gum Chew 1 each (2 mg total) as needed for smoking cessation  Patient not taking: Reported on 8/16/2023 7/10/23 8/24/23  Princess Lacho DO   senna-docusate sodium (SENOKOT S) 8.6-50 mg per tablet Take 2 tablets by mouth 2 (two) times a day  Patient not taking: Reported on 8/16/2023 4/19/23 8/24/23  Keshav Bullocks, DO   white petrolatum-mineral oil (EUCERIN,HYDROCERIN) cream Apply topically 3 (three) times a day as needed (dry skin)  Patient not taking: Reported on 6/27/2023 4/19/23 8/24/23  Keshav Mendez, DO     I have reviewed home medications with patient personally. Allergies: No Known Allergies    Social History:     Marital Status: /Civil Union   Occupation: N/A  Patient Pre-hospital Living Situation: Lives at home  Patient Pre-hospital Level of Mobility: Requires assistance due to L/S AKA  Patient Pre-hospital Diet Restrictions: Diabetic  Substance Use History:   Social History     Substance and Sexual Activity   Alcohol Use Not Currently    Comment: not currently     Social History     Tobacco Use   Smoking Status Former   • Packs/day: 1.00   • Years: 20.00   • Total pack years: 20.00   • Types: Cigarettes   • Quit date: 3/24/2023   • Years since quittin.4   Smokeless Tobacco Former   • Quit date: 2022     Social History     Substance and Sexual Activity   Drug Use Not Currently   • Types: Cocaine, Marijuana, "Crack" cocaine    Comment: 1 years clean from crack cocaine since        Family History:    non-contributory    Physical Exam:     Vitals:   Blood Pressure: 138/85 (23)  Pulse: 88 (23)  Temperature: 98.3 °F (36.8 °C) (23)  Temp Source: Oral (23)  Respirations: 16 (23)  Weight - Scale: 128 kg (281 lb 4.9 oz) (23 1338)  SpO2: 100 % (23)    Physical Exam  Vitals and nursing note reviewed. Constitutional:       Appearance: She is obese. She is not toxic-appearing or diaphoretic. Interventions: Nasal cannula in place. HENT:      Head: Normocephalic and atraumatic. Right Ear: External ear normal.      Left Ear: External ear normal.      Nose: Nose normal. No congestion. Mouth/Throat:      Pharynx: Oropharynx is clear. Eyes:      General: No scleral icterus. Right eye: No discharge. Left eye: No discharge. Extraocular Movements: Extraocular movements intact. Conjunctiva/sclera: Conjunctivae normal.      Pupils: Pupils are equal, round, and reactive to light.    Cardiovascular:      Rate and Rhythm: Normal rate and regular rhythm. Pulses: Normal pulses. Heart sounds: Normal heart sounds. No murmur heard. Pulmonary:      Effort: Pulmonary effort is normal.      Breath sounds: Normal breath sounds. Abdominal:      General: Bowel sounds are normal. There is distension. Palpations: Abdomen is soft. Tenderness: There is no right CVA tenderness or left CVA tenderness. Musculoskeletal:         General: Normal range of motion. Cervical back: Normal range of motion and neck supple. No tenderness. Right lower leg: Edema present. Comments: Left sided AKA    Skin:     General: Skin is warm and dry. Capillary Refill: Capillary refill takes less than 2 seconds. Findings: No erythema. Neurological:      General: No focal deficit present. Mental Status: She is oriented to person, place, and time. Mental status is at baseline. Cranial Nerves: No cranial nerve deficit. Sensory: No sensory deficit. Motor: No weakness. Psychiatric:         Mood and Affect: Mood normal.         Behavior: Behavior normal. Behavior is cooperative. Additional Data:     Lab Results: I have personally reviewed pertinent reports. Results from last 7 days   Lab Units 08/24/23  1632   WBC Thousand/uL 10.88*   HEMOGLOBIN g/dL 11.9   HEMATOCRIT % 35.8   PLATELETS Thousands/uL 399*   NEUTROS PCT % 63   LYMPHS PCT % 28   MONOS PCT % 5   EOS PCT % 2     Results from last 7 days   Lab Units 08/24/23  1632   POTASSIUM mmol/L 4.1   CHLORIDE mmol/L 102   CO2 mmol/L 24   BUN mg/dL 12   CREATININE mg/dL 0.43*   CALCIUM mg/dL 8.9   ALK PHOS U/L 120*   ALT U/L 20   AST U/L 17     Results from last 7 days   Lab Units 08/24/23  1632   INR  1.66*       Imaging: I have personally reviewed pertinent reports. CT head without contrast    Result Date: 8/24/2023  Narrative: CT BRAIN - WITHOUT CONTRAST INDICATION:   pain. COMPARISON: 8/6/2020. TECHNIQUE:  CT examination of the brain was performed. Multiplanar 2D reformatted images were created from the source data. Radiation dose length product (DLP) for this visit:  965 mGy-cm . This examination, like all CT scans performed in the [facility], was performed utilizing techniques to minimize radiation dose exposure, including the use of iterative reconstruction and automated exposure control. IMAGE QUALITY:  Diagnostic. FINDINGS: PARENCHYMA:  No intracranial mass, mass effect or midline shift. No CT signs of acute infarction. No acute parenchymal hemorrhage. Left lentiform nucleus hypoattenuation suggesting probable chronic infarction. Associated ex vacuo dilatation anterior horn left lateral ventricle. VENTRICLES AND EXTRA-AXIAL SPACES:  Normal for the patient's age. VISUALIZED ORBITS: Normal visualized orbits. PARANASAL SINUSES: Normal visualized paranasal sinuses. CALVARIUM AND EXTRACRANIAL SOFT TISSUES:  Normal.     Impression: No acute intracranial abnormality. Hypoattenuation left lentiform nucleus and corresponding left frontal horn ex vacuo dilatation concerning for chronic infarction but new since prior 2020 study. Consider follow-up nonemergent MRI of the brain especially  given the patient's age. The study was marked in EPIC for significant notification. Workstation performed: [id number]     XR chest 1 view portable    Result Date: 8/24/2023  Narrative: CHEST INDICATION:   pain. COMPARISON: 3/25/2023 EXAM PERFORMED/VIEWS:  XR CHEST PORTABLE FINDINGS: Cardiomediastinal silhouette appears unremarkable. The lungs are clear. Holter monitor. No pneumothorax or pleural effusion. Osseous structures appear within normal limits for patient age. Impression: No acute cardiopulmonary disease.  Workstation performed: [id number]     VAS lower limb venous duplex study, unilateral/limited    Result Date: 8/11/2023  Narrative:  [facility] REPORT CLINICAL: Indications: Physician wants to determine patency of the venous system secondary to severe intermittent pain of the left lower extremity since AKA done in March 2023. Operative History: 2021-09-14 Left CFA to BK popliteal bypass graft with insitu GSV 2021-08-25 Left SFA stent angioplasty and popliteal stent placement 2021-08-11 Left SFA stent placement x 2 Risk Factors The patient has history of HTN, Diabetes (IDDM), PAD and smoking (current) 1-2 ppd. CONCLUSION:  Impression:  RIGHT LOWER LIMB LIMITED: Evaluation shows no evidence of thrombus in the common femoral vein. Doppler evaluation shows a normal response to augmentation maneuvers. LEFT LOWER LIMB: No gross evidence of acute deep vein thrombosis No evidence of superficial thrombophlebitis noted. Doppler evaluation shows a normal response to augmentation maneuvers. Native superficial femoral artery and bypass graft appear occluded. Triphasic waveforms noted in the common femoral artery. Technically difficult/limited study. Some segments may be poorly visualized on today's exam.  Technical findings were given to [address] [place] [address] [place] via [employer] Receipt[employer] text. SIGNATURE: Electronically Signed by: Basilio Velazquez MD on 2023-08-11 10:50:24 AM    CTA lower extremity left w wo contrast    Result Date: 7/30/2023  Narrative: CT ANGIOGRAM OF THE BILATERAL LOWER EXTREMITIES WITH IV CONTRAST INDICATION: Kai Mackay out of bed onto left above-knee amputation stump. COMPARISON: CT venogram bilateral lower extremity 9/23/2021 TECHNIQUE:  CT angiogram examination of the bilateral lower extremities was performed according to standard protocol with intravenous contrast.  This examination, like all CT scans performed in the [facility], was performed utilizing techniques to minimize radiation dose exposure, including the use of iterative reconstruction and automated exposure control. 3D reconstructions were performed an independent workstation, and are supplied for review.    Rad dose 1097 mGy-cm IV Contrast:  100 mL of iohexol (OMNIPAQUE) FINDINGS: VASCULAR STRUCTURES:   Bilateral iliofemoral arteries are patent with normal caliber. Right leg: Profunda femoris and proximal superficial femoral arteries are patent. Evaluation of distal superficial femoral artery and popliteal artery are limited due to inadequate contrast bolus. Left leg: Profunda femoris is patent. Native SFA and popliteal arteries are occluded. Femoral to popliteal artery bypass is occluded. Postsurgical changes of above-knee amputation. There is atrophy of the left gluteal muscles and musculature of the left thigh. OTHER FINDINGS PELVIS REPRODUCTIVE ORGANS: Uterine fibroids present. 3.5 cm hypodense mass in the region of the left adnexa is which may represent cystic change of ovary. URINARY BLADDER:  Unremarkable. BOWEL: Unremarkable. PELVIC CAVITY:   No pathologically enlarged mesenteric or retroperitoneal lymph nodes. No ascites or free intraperitoneal air. ABDOMINAL WALL/INGUINAL REGIONS: Small focus of air in the right anterior abdominal wall subcutaneous tissues which may be related to medication injection. Small fat-containing infraumbilical hernia. OSSEOUS STRUCTURES:  No acute fracture or destructive osseous lesion. Impression: Above-the-knee amputation of the left lower extremity without arterial injury or hematoma. Workstation performed: [id number]     XR femur 2 vw left    Result Date: 7/30/2023  Narrative: LEFT FEMUR INDICATION:   fall, hit L leg. AKA, on blood thinners, concern for bleeding. COMPARISON:  None VIEWS:  XR FEMUR 2 VW LEFT FINDINGS: Status post amputation of the left femur at the level of the distal shaft. Cortical margins are sharp. No significant degenerative changes. No lytic or blastic osseous lesion. Vascular stents noted. Impression: Status post amputation of the left femoral shaft with intact cortical margins. No evidence for osteomyelitis.  Workstation performed: [id number]       EKG, Pathology, and Other Studies Reviewed on Admission:   · EKG: NSR    Epic / Care Everywhere Records Reviewed: Yes     ** Please Note: This note has been constructed using a voice recognition system.  **

## 2023-08-25 NOTE — ASSESSMENT & PLAN NOTE
· Continue albuterol inhaler and fluticasone-vilanterol 200-25 mcg/actuation 1 puff  · Goal SPO2 greater than 88% while asleep greater than 90% while awake  · Continue to monitor vital signs

## 2023-08-25 NOTE — ASSESSMENT & PLAN NOTE
· Continue albuterol inhaler and fluticasone-vilanterol 200-25 mcg/actuation 1 puff  · Goal SPO2 greater than 88% while asleep greater than 90% while awake  · Continue to monitor vital signs  · Follow up with pulmonology as outpatient

## 2023-08-25 NOTE — ASSESSMENT & PLAN NOTE
Lab Results   Component Value Date    HGBA1C 9.9 (H) 08/25/2023       Recent Labs     08/25/23  0356 08/25/23  0603 08/25/23  0806 08/25/23  1027   POCGLU 300* 199* 182* 208*       Blood Sugar Average: Last 72 hrs:  (P) [id number]     · Poorly controlled blood sugars per chart review  · Home regimen with metformin 1000 mg daily and Trulicity 1.5 mg q. weekly, insulin glargine 45 units nightly, and sliding scale correction  · Patient was given Decadron during ED encounter for headache    Plan:  · Continue gabapentin for neuropathy  · Hypoglycemic protocol  · Endocrinology consulted; will continue on insulin drip pending recommendations  · Hold metformin and Trulicity  · Sliding scale insulin algorithm #2  · Accu-Cheks  · Blood glucose goal less than 180  · Diabetic diet

## 2023-08-25 NOTE — ASSESSMENT & PLAN NOTE
· Denies any pain on her stump  · Continue Xarelto and statin Render Note In Bullet Format When Appropriate: No Consent: The patient's consent was obtained including but not limited to risks of crusting, scabbing, blistering, scarring, darker or lighter pigmentary change, recurrence, incomplete removal and infection. Number Of Freeze-Thaw Cycles: 2 freeze-thaw cycles Post-Care Instructions: I reviewed with the patient in detail post-care instructions. Patient is to wear sunprotection, and avoid picking at any of the treated lesions. Pt may apply Vaseline to crusted or scabbing areas. Detail Level: Simple Duration Of Freeze Thaw-Cycle (Seconds): 2

## 2023-08-25 NOTE — ASSESSMENT & PLAN NOTE
· POA 55-year-old female patient presented to  [facility] with headache that started night before and progressively worsened into the morning  · Imaging with CT head showed no acute intracranial normalities; hypoattenuation left lentiform nucleus and corresponding left frontal horn and dilatation concerning for chronic infarction but new since prior 2020 study  · Patient has a history of migraines which have been well controlled on daily topiramate. Current headache is full head rather than one sided like her typical migraine and has associated photophobia. · Patient was given Decadron, Benadryl, magnesium sulfate in ED for which she reports some relief. Given benadryl and Mg again for continued HA without relief. Holding decadron in hyperglycemia. · Examination without any neurological deficits      · Plan:  · Continue migraine prophylaxis PTA topiramate and cocktail (benadryl, reglan, toradol); avoid opioids for HA  · 1 time dose of Depacon 500mg IV today. If this controls HA pt can be discharged on depakote taper 250mg BID x3d -> 250mg QD x2d  · Blood glucose control as below  · Avoid overcorrection of blood pressure  · Neurology consulted and signed off.  Plan for neuro follow up as outpatient

## 2023-08-25 NOTE — ASSESSMENT & PLAN NOTE
· Patient blood pressure upon initial evaluation was elevated 160/110  · Home regimen with Minipress 1 mg p.o. nightly and lisinopril    Plan:  · Continue home regimen  · Optimize pain control  · Continue monitoring vital signs/BP  · Adjust regimen accordingly

## 2023-08-25 NOTE — ASSESSMENT & PLAN NOTE
· Patient reported experiencing chest tightness/chest pain  · Cardiac work-up in ED was negative for any signs of ischemia/no elevation in troponin, EKG NSR  · Sees cardiology as outpatient  · Reports having a history of palpitations  · Per cardiology patient will have 1 week of Zio patch and echocardiogram  · We will continue to monitor off telemetry as patient is currently wearing a monitor at this time  · Plan for outpatient cardiology follow up as planned

## 2023-08-25 NOTE — CONSULTS
NEUROLOGY RESIDENCY CONSULT NOTE     Name: Maribeth Scott   Age & Sex: 40 y.o. female   MRN: [id number]  Unit/Bed#: ICU 12   Encounter: [id number]  Length of Stay: 0    ASSESSMENT & PLAN     * Headache  Assessment & Plan  40 y.o.  female insulin-dependent diabetes, PAD s/p LLE bypass and stenting 6/2164 complicated by stent thrombosis, left-sided above-knee amputation, COPD, BMI 53 who presents with pressure headache that started yesterday. Her glucose was found to be uncontrolled with Max 615. Impression: patient's elevated blood pressure and glucose can be contributing to her headache. She has hx of migraine controlled with Topamax. Currently headache has some migrainosus features like photophobia and nausea. Plan:  -Discussed plan with neurology attending, Dr. Meredith Ge  -Will trial Depacon 500mg IV x1 and a round of migraine cocktail: Reglan, Benadryl, and Toradol  -If patient's head pressure is improved with valproic acid, she can be discharged from Neurology standpoint if medically cleared. Will defer to Primary team.  -Patient can be discharged home with Depakote taper 250 mg twice daily for 3 days and 250 mg daily for 2 days. -CTH finding is chronic and patient did not have any neurological symptoms or deficits  -Avoid opioid pain meds for head pressure since patient will have worsening rebound headache. Explained to the patient that Dilaudid is not the treatment for the headache and can cause worsening headache. She understood.  -Blood glucose and blood pressure control   -Rest of medical management per Primary team  -No further management from neurology. Please call with questions or concerns    She will need to follow up with Headache clinic in 4-6 weeks        Maribeth Scott will need follow up in 4-6 weeks with headache attending/resident or AP. She will not require outpatient neurological testing.      Pending for discharge: Headache management and blood glucose    SUBJECTIVE     Reason for Consult / Principal Problem: headache  Hx and PE limited by: None    HPI: Giovanny Quinteros is a 40 y.o.  female insulin-dependent diabetes, PAD s/p LLE bypass and stenting 2/7175 complicated by stent thrombosis, left-sided above-knee amputation, COPD, BMI 53 who presents with pressure headache. Headache started on 8/24 and is progressively worsened. It is described as pressure/squeezing feeling in the frontal part of her head associated with photophobia and some nausea yesterday but not this AM. Home blood pressure elevated at 168/110 and her home health care nurse recommended her to go to ED. In ED, CT head showed possible chronic infarct in the left lentiform nucleus likely associated with ex vacuo dilatation in the anterior horn of the left lateral ventricle. Leukocytosis of 10.88, thrombocytosis 399, UA with elevated glucose, elevated blood glucose max 615. Patient received migraine cocktail in the ED without improvement. Otherwise, no other neurological symptoms/deficits. She has received so far Benadryl 25mgx 3 doses, Decadron 10mg x1, Magnesium 2g x2, Reglan 10mg x3, Toradol 15mg x1 and 30mg x1, Flexeril 10mg x1, Dilaudid 1mg x1, 4mg x2, oxycodone 5mg x1.      8/24/2023 CTH: No acute intracranial abnormality. Hypoattenuation left lentiform nucleus and corresponding left frontal horn ex vacuo dilatation concerning for chronic infarction but new since prior 2020 study. Consider follow-up nonemergent MRI of the brain especially given the patient's age. Inpatient consult to Neurology  Consult performed by:  Serenity Diaz MD  Consult ordered by: Rock Zazueta DO          Historical Information   Past Medical History:   Diagnosis Date   • Asthma    • Atherosclerosis    • Bipolar 1 disorder ([address])    • COPD (chronic obstructive pulmonary disease) ([address])    • Depression    • Diabetes mellitus ([address])    • Hypertension    • Psychiatric disorder     cutting history   • PTSD (post-traumatic stress disorder)    • Schizoaffective disorder ([address] [facility])    • Tendonitis      Past Surgical History:   Procedure Laterality Date   • AMPUTATION ABOVE KNEE (AKA) Left 3/29/2023    Procedure: AMPUTATION ABOVE KNEE (AKA); Surgeon: Jeni Alexandra MD;  Location: [facility];  Service: Vascular   • BYPASS FEMORAL-POPLITEAL Left 2021    Procedure: Left Common Femoral Below Knee to Popliteal Bypass with Insitu GSV graft. Left Lower Extremity Angiogram;  Surgeon: Jeni Alexandra MD;  Location: [facility];  Service: Vascular   •  SECTION     • EAR SURGERY     • IR LOWER EXTREMITY ANGIOGRAM  2021   • IR LOWER EXTREMITY ANGIOGRAM  2021   • WI SLCTV CATHJ 3RD+ ORD SLCTV ABDL PEL/LXTR [facility] Left 3/24/2023    Procedure: Left leg angiogram;  Surgeon: Suellen Ann DO;  Location: [facility];  Service: Vascular   • TUBAL LIGATION       Social History   Social History     Substance and Sexual Activity   Alcohol Use Not Currently    Comment: not currently     Social History     Substance and Sexual Activity   Drug Use Not Currently   • Types: Cocaine, Marijuana, "Crack" cocaine    Comment: 1 years clean from crack cocaine since      E-Cigarette/Vaping   • E-Cigarette Use Never User      E-Cigarette/Vaping Substances   • Nicotine No    • THC No    • CBD No    • Flavoring No    • Other No    • Unknown No      Social History     Tobacco Use   Smoking Status Former   • Packs/day: 1.00   • Years: 20.00   • Total pack years: 20.00   • Types: Cigarettes   • Quit date: 3/24/2023   • Years since quittin.4   Smokeless Tobacco Former   • Quit date: 2022     Family History:   Family History   Problem Relation Age of Onset   • Hypertension Mother    • Diabetes Mother    • HIV Mother    • Heart disease Mother    • No Known Problems Father      Meds/Allergies   PTA meds:   Prior to Admission Medications   Prescriptions Last Dose Informant Patient Reported? Taking?    Advair -21 MCG/ACT inhaler   No No   Sig: Inhale 2 puffs 2 (two) times a day Rinse mouth after use   Alcohol Swabs (Pharmacist Choice Alcohol) PADS  Outside Facility (Specify), Self No No   Sig: USE 3-4 TIMES AS DIRECTED.    Blood Pressure Monitoring (Blood Pressure Monitor Automat) [address] (Specify), Self No No   Sig: Use Daily as Directed   Comfort EZ Pen Needles 33G X 4 MM MISC  Outside Facility (Specify), Self No No   Sig: USE AS DIRECTED   DULoxetine (CYMBALTA) 20 mg capsule   No No   Sig: Take 2 capsules (40 mg total) by mouth daily   Global Inject Ease Lancets 30G MISC  Outside Facility (Specify), Self No No   Sig: USE 3 (THREE) TIMES A DAY   HYDROmorphone (DILAUDID) 2 mg tablet  Outside Facility (Specify), Self No No   Sig: Take 1 (2mg) to 2 tabs (4mg) by mouth every 4 hours as needed for moderate to severe pain   Insulin Pen Needle (Comfort EZ Pen Needles) 32G X 4 MM MISC  Outside Facility (Specify), Self No No   Sig: Inject under the skin 4 (four) times a day (before meals and at bedtime)   OneTouch Ultra test strip   Yes No   Sig: TEST TWICE DAILY ()   QUEtiapine (SEROquel) 200 mg tablet Not Taking Outside Facility (Specify), Self Yes No   Sig: Take by mouth daily at bedtime   Patient not taking: Reported on 8/25/5194   Trulicity 1.5 WP/0.6GL injection  Outside Facility (Specify), Self Yes No   Sig: UNCERTAIN OF DOSE   albuterol (PROVENTIL HFA,VENTOLIN HFA) 90 mcg/act inhaler   No No   Sig: Inhale 2 puffs every 4 (four) hours as needed for wheezing or shortness of breath   aspirin 81 mg chewable tablet   No No   Sig: Chew 1 tablet (81 mg total) daily   atorvastatin (LIPITOR) 40 mg tablet   No No   Sig: Take 1 tablet (40 mg total) by mouth daily with dinner   gabapentin (NEURONTIN) 300 mg capsule  Outside Facility (Specify), Self No No   Sig: Take 1 capsule (300 mg total) by mouth 3 (three) times a day   hydrOXYzine HCL (ATARAX) 50 mg tablet  Outside Facility (Specify), Self No No   Sig: Take 1 tablet (50 mg total) by mouth every 8 (eight) hours as needed for itching or anxiety   insulin glargine (LANTUS) 100 units/mL subcutaneous injection  Outside Facility (Specify), Self No No   Sig: Inject 45 Units under the skin daily at bedtime   insulin lispro (HumaLOG) 100 units/mL injection  Outside Facility (Specify), Self No No   Sig: Inject 2-12 Units under the skin 3 (three) times a day before meals   Patient not taking: Reported on 8/16/2023   insulin lispro (HumaLOG) 100 units/mL injection Not Taking Outside Facility (Specify), Self No No   Sig: Inject 20 Units under the skin daily with breakfast Do not start before April 20, 2023.    Patient not taking: Reported on 8/24/2023   lisinopril (ZESTRIL) 20 mg tablet   No No   Sig: Take 1 tablet (20 mg total) by mouth daily   metFORMIN (GLUCOPHAGE) 1000 MG tablet  Outside Facility (Specify), Self No No   Sig: TAKE ONE (1) TABLET BY MOUTH TWICE DAILY WITH FOOD   methocarbamol (ROBAXIN) 750 mg tablet Not Taking Outside Facility (Specify), Self No No   Sig: Take 1 tablet (750 mg total) by mouth every 6 (six) hours as needed for muscle spasms   Patient not taking: Reported on 8/16/2023   nicotine (NICODERM CQ) 21 mg/24 hr TD 24 hr patch   No No   Sig: Place 1 patch on the skin over 24 hours daily   Patient not taking: Reported on 8/16/2023   polyethylene glycol (MIRALAX) 17 g packet  Outside Facility (Specify), Self No No   Sig: Take 17 g by mouth daily   Patient not taking: Reported on 8/16/2023   prazosin (MINIPRESS) 1 mg capsule   No No   Sig: Take 1 capsule (1 mg total) by mouth daily at bedtime   rivaroxaban (XARELTO) 20 mg tablet   No No   Sig: Take 1 tablet (20 mg total) by mouth daily with breakfast   simethicone (MYLICON) 80 mg chewable tablet Not Taking Outside Facility (Specify), Self No No   Sig: Chew 1 tablet (80 mg total) every 6 (six) hours as needed for flatulence   Patient not taking: Reported on 8/16/2023   topiramate (TOPAMAX) 25 mg tablet  Outside Facility (Specify), Self No No   Sig: Take 1 tablet (25 mg total) by mouth 2 (two) times a day      Facility-Administered Medications: None     No Known Allergies    Review of previous medical records was completed. Review of Systems   Constitutional: Negative for chills and fever. HENT: Negative for ear pain and sore throat. Eyes: Negative for pain and visual disturbance. Respiratory: Negative for cough and shortness of breath. Cardiovascular: Negative for chest pain and palpitations. Gastrointestinal: Negative for abdominal pain and vomiting. Genitourinary: Negative for dysuria and hematuria. Musculoskeletal: Negative for arthralgias and back pain. Skin: Negative for color change and rash. Neurological: Positive for headaches. Negative for dizziness, seizures, syncope, speech difficulty, weakness and numbness. All other systems reviewed and are negative. OBJECTIVE     Patient ID: Soy Yoder is a 40 y.o. female. Vitals:   Vitals:    23 1200 23 1300 23 1324 23 1400   BP:       BP Location:       Pulse: 95 93 96 98   Resp: (!) 23 (!) 29 (!) 23 (!) 24   Temp:       TempSrc:       SpO2: 96% 97% 95% 96%   Weight:          Body mass index is 53.15 kg/m². Intake/Output Summary (Last 24 hours) at 2023 1606  Last data filed at 2023 1101  Gross per 24 hour   Intake 1076.37 ml   Output 2275 ml   Net -1198.63 ml       Temperature:   Temp (24hrs), Av.1 °F (36.7 °C), Min:97.9 °F (36.6 °C), Max:98.3 °F (36.8 °C)    Temperature: 98.2 °F (36.8 °C)    Invasive Devices: Invasive Devices     Peripheral Intravenous Line  Duration           Peripheral IV 23 Right Antecubital 1 day    Peripheral IV 23 Left;Ventral (anterior) Forearm <1 day                Physical Exam  Vitals and nursing note reviewed. Constitutional:       General: She is not in acute distress. Appearance: She is well-developed. HENT:      Head: Normocephalic and atraumatic.    Eyes:      Extraocular Movements: Extraocular movements intact and EOM normal.      Conjunctiva/sclera: Conjunctivae normal.      Pupils: Pupils are equal, round, and reactive to light. Cardiovascular:      Rate and Rhythm: Normal rate. Pulmonary:      Effort: Pulmonary effort is normal. No respiratory distress. Abdominal:      Palpations: Abdomen is soft. Tenderness: There is no abdominal tenderness. Musculoskeletal:         General: No swelling. Normal range of motion. Cervical back: Neck supple. Comments: Left above knee amputation   Skin:     General: Skin is warm and dry. Capillary Refill: Capillary refill takes less than 2 seconds. Neurological:      Mental Status: She is alert and oriented to person, place, and time. Coordination: Finger-Nose-Finger Test normal.   Psychiatric:         Mood and Affect: Mood normal.         Speech: Speech normal.          Neurologic Exam     Mental Status   Oriented to person, place, and time. Speech: speech is normal   Level of consciousness: alert    Cranial Nerves     CN II   Visual fields full to confrontation. CN III, IV, VI   Pupils are equal, round, and reactive to light. Extraocular motions are normal.     CN V   Facial sensation intact. CN VII   Facial expression full, symmetric. CN VIII   CN VIII normal.     CN IX, X   CN IX normal.   CN X normal.     CN XI   CN XI normal.     CN XII   CN XII normal.     Motor Exam   Muscle bulk: normal  Overall muscle tone: normal    Strength   Strength 5/5 except as noted. Left above knee amputation     Sensory Exam   Light touch normal.     Gait, Coordination, and Reflexes     Coordination   Finger to nose coordination: normal     LABORATORY DATA     Labs: I have personally reviewed pertinent reports.     Results from last 7 days   Lab Units 08/25/23  0611 08/24/23  1632   WBC Thousand/uL 15.03* 10.88*   HEMOGLOBIN g/dL 10.4* 11.9   HEMATOCRIT % 31.3* 35.8   PLATELETS Thousands/uL 435* 399*   NEUTROS PCT % 88* 63   MONOS PCT % 3* 5   EOS PCT % 0 2      Results from last 7 days   Lab Units 08/25/23  0611 08/24/23  2257 08/24/23  1632   POTASSIUM mmol/L 3.6 4.8 4.1   CHLORIDE mmol/L 104 99 102   CO2 mmol/L 25 18* 24   BUN mg/dL 13 14 12   CREATININE mg/dL 0.46* 0.65 0.43*   CALCIUM mg/dL 8.4 8.1* 8.9   ALK PHOS U/L 107* 124* 120*   ALT U/L 22 21 20   AST U/L 13 22 17     Results from last 7 days   Lab Units 08/25/23  0611   MAGNESIUM mg/dL 1.9          Results from last 7 days   Lab Units 08/25/23  0611 08/24/23  1632   INR  1.06 1.66*   PTT seconds 27 24               IMAGING & DIAGNOSTIC TESTING     Radiology Results: I have personally reviewed pertinent reports. CT head without contrast   Final Result by Maryana Smith MD (08/24 1541)      No acute intracranial abnormality. Hypoattenuation left lentiform nucleus and corresponding left frontal horn ex vacuo dilatation concerning for chronic infarction but new since prior 2020 study. Consider follow-up nonemergent MRI of the brain especially    given the patient's age. The study was marked in EPIC for significant notification. Workstation performed: [id number]         XR chest 1 view portable   ED Interpretation by Hank Cruz PA-C (08/24 9316)   No focal consolidation      Final Result by Mj Vo MD (08/24 1507)      No acute cardiopulmonary disease. Workstation performed: [id number]             Other Diagnostic Testing: I have personally reviewed pertinent reports.       ACTIVE MEDICATIONS     Current Facility-Administered Medications   Medication Dose Route Frequency   • acetaminophen (TYLENOL) tablet 975 mg  975 mg Oral Q6H PRN   • albuterol (PROVENTIL HFA,VENTOLIN HFA) inhaler 2 puff  2 puff Inhalation Q4H PRN   • aspirin chewable tablet 81 mg  81 mg Oral Daily   • atorvastatin (LIPITOR) tablet 40 mg  40 mg Oral Daily With Dinner   • co-enzyme Q-10 capsule 30 mg  30 mg Oral Daily   • cyclobenzaprine (FLEXERIL) tablet 10 mg  10 mg Oral TID PRN   • diphenhydrAMINE (BENADRYL) injection 25 mg  25 mg Intravenous Q8H [address]   • DULoxetine (CYMBALTA) delayed release capsule 40 mg  40 mg Oral Daily   • fluticasone-vilanterol 200-25 mcg/actuation 1 puff  1 puff Inhalation Daily   • gabapentin (NEURONTIN) capsule 300 mg  300 mg Oral TID   • HYDROmorphone (DILAUDID) tablet 4 mg  4 mg Oral Q4H PRN   • hydrOXYzine HCL (ATARAX) tablet 50 mg  50 mg Oral Q8H PRN   • insulin glargine (LANTUS) subcutaneous injection 35 Units 0.35 mL  35 Units Subcutaneous Q12H [address]   • [START ON 8/26/2023] insulin lispro (HumaLOG) 100 units/mL subcutaneous injection 2-12 Units  2-12 Units Subcutaneous 4x Daily (AC & HS)   • insulin lispro (HumaLOG) 100 units/mL subcutaneous injection 20 Units  20 Units Subcutaneous TID With Meals   • insulin regular (HumuLIN R,NovoLIN R) 1 Units/mL in sodium chloride 0.9 % 100 mL infusion  0.3-21 Units/hr Intravenous Titrated   • ketorolac (TORADOL) injection 30 mg  30 mg Intravenous Q8H [address]   • magnesium sulfate 2 g/50 mL IVPB (premix) 2 g  2 g Intravenous Q24H [address]   • metoclopramide (REGLAN) injection 10 mg  10 mg Intravenous Q8H [address]   • [START ON 8/26/2023] nicotine (NICODERM CQ) 14 mg/24hr TD 24 hr patch 14 mg  14 mg Transdermal Daily   • oxyCODONE (ROXICODONE) IR tablet 5 mg  5 mg Oral Q4H PRN   • oxyCODONE (ROXICODONE) split tablet 2.5 mg  2.5 mg Oral Q4H PRN   • prazosin (MINIPRESS) capsule 1 mg  1 mg Oral HS   • rivaroxaban (XARELTO) tablet 20 mg  20 mg Oral Daily With Breakfast   • sodium chloride 0.9 % infusion  100 mL/hr Intravenous Once   • topiramate (TOPAMAX) tablet 25 mg  25 mg Oral BID       Prior to Admission medications    Medication Sig Start Date End Date Taking?  Authorizing Provider   Advair -21 MCG/ACT inhaler Inhale 2 puffs 2 (two) times a day Rinse mouth after use 8/9/23   Peña Lubin MD   albuterol (PROVENTIL HFA,VENTOLIN HFA) 90 mcg/act inhaler Inhale 2 puffs every 4 (four) hours as needed for wheezing or shortness of breath 8/9/23   Jessica Redding MD   Alcohol Swabs (Pharmacist Choice Alcohol) PADS USE 3-4 TIMES AS DIRECTED. 11/1/22   Jennyfer Kee MD   aspirin 81 mg chewable tablet Chew 1 tablet (81 mg total) daily 8/9/23   Jessica Redding MD   atorvastatin (LIPITOR) 40 mg tablet Take 1 tablet (40 mg total) by mouth daily with dinner 8/9/23   Jessica Redding MD   Blood Pressure Monitoring (Blood Pressure Monitor Automat) KATLYN Use Daily as Directed 3/5/21   Juan Carlos Jose M, DO   Comfort EZ Pen Needles 33G X 4 MM MISC USE AS DIRECTED 5/10/23   Jessica Redding MD   DULoxetine (CYMBALTA) 20 mg capsule Take 2 capsules (40 mg total) by mouth daily 7/10/23 10/8/23  Prasanth Anderson, DO   gabapentin (NEURONTIN) 300 mg capsule Take 1 capsule (300 mg total) by mouth 3 (three) times a day 4/19/23   Maria C Wallace DO   Global Inject Ease Lancets 30G MISC USE 3 (THREE) TIMES A DAY 2/22/22   Adolph Mena,    HYDROmorphone (DILAUDID) 2 mg tablet Take 1 (2mg) to 2 tabs (4mg) by mouth every 4 hours as needed for moderate to severe pain 4/19/23   Maria C Wallace DO   hydrOXYzine HCL (ATARAX) 50 mg tablet Take 1 tablet (50 mg total) by mouth every 8 (eight) hours as needed for itching or anxiety 4/19/23   Maria C Wallace DO   insulin glargine (LANTUS) 100 units/mL subcutaneous injection Inject 45 Units under the skin daily at bedtime 4/19/23   Maria C Wallace DO   insulin lispro (HumaLOG) 100 units/mL injection Inject 2-12 Units under the skin 3 (three) times a day before meals  Patient not taking: Reported on 8/16/2023 4/19/23   Maria C Wallace DO   insulin lispro (HumaLOG) 100 units/mL injection Inject 20 Units under the skin daily with breakfast Do not start before April 20, 2023.   Patient not taking: Reported on 8/24/2023 4/20/23   Maria C Wallace DO   Insulin Pen Needle (Comfort EZ Pen Needles) 32G X 4 MM MISC Inject under the skin 4 (four) times a day (before meals and at bedtime) 1/24/23   Cherelle Goldsmith MD   lisinopril (ZESTRIL) 20 mg tablet Take 1 tablet (20 mg total) by mouth daily 8/9/23   Lubna Rossi MD   metFORMIN (GLUCOPHAGE) 1000 MG tablet TAKE ONE (1) TABLET BY MOUTH TWICE DAILY WITH FOOD 5/9/23   Lubna Rossi MD   methocarbamol (ROBAXIN) 750 mg tablet Take 1 tablet (750 mg total) by mouth every 6 (six) hours as needed for muscle spasms  Patient not taking: Reported on 8/16/2023 4/19/23   Arpit Norton DO   nicotine (NICODERM CQ) 21 mg/24 hr TD 24 hr patch Place 1 patch on the skin over 24 hours daily  Patient not taking: Reported on 8/16/2023 7/10/23   Mary Lou Ibarra DO   OneTouch Ultra test strip TEST TWICE DAILY () 7/27/23   Historical Provider, MD   polyethylene glycol (MIRALAX) 17 g packet Take 17 g by mouth daily  Patient not taking: Reported on 8/16/2023 4/19/23   Arpit Norton DO   prazosin (MINIPRESS) 1 mg capsule Take 1 capsule (1 mg total) by mouth daily at bedtime 8/9/23   Lubna Rossi MD   QUEtiapine (SEROquel) 200 mg tablet Take by mouth daily at bedtime  Patient not taking: Reported on 8/24/2023 3/8/23   Historical Provider, MD   rivaroxaban (XARELTO) 20 mg tablet Take 1 tablet (20 mg total) by mouth daily with breakfast 8/10/23   Candida Cruz DO   simethicone (MYLICON) 80 mg chewable tablet Chew 1 tablet (80 mg total) every 6 (six) hours as needed for flatulence  Patient not taking: Reported on 8/16/2023 4/19/23   Arpit Norton DO   topiramate (TOPAMAX) 25 mg tablet Take 1 tablet (25 mg total) by mouth 2 (two) times a day 4/19/23   Arpit Norton DO   Trulicity 1.5 FI/4.2ZD injection UNCERTAIN OF DOSE 7/67/09   Historical Provider, MD         CODE STATUS & ADVANCED DIRECTIVES     Code Status: Level 1 - Full Code  Advance Directive and Living Will:      Power of :    POLST:        VTE Pharmacologic Prophylaxis: Rivaroxaban (Xarelto)  VTE Mechanical Prophylaxis: sequential compression device    ==  MD Everardo Valdez's Neurology Residency, PGY-3

## 2023-08-25 NOTE — ASSESSMENT & PLAN NOTE
Body mass index is 53.15 kg/m².   We will need post discharge weight management/lifestyle modification discussion

## 2023-08-25 NOTE — PROGRESS NOTES
[address] [facility]  Progress Note  Name: Katya Dubois  MRN: [id number]  Unit/Bed#: ICU 12 I Date of Admission: 8/24/2023   Date of Service: 8/25/2023 I Hospital Day: 0    Assessment/Plan   * Headache  Assessment & Plan  · POA 60-year-old female patient presented to [facility] with headache that started night before and progressively worsened into the morning  · Imaging with CT head showed no acute intracranial normalities; hypoattenuation left lentiform nucleus and corresponding left frontal horn and dilatation concerning for chronic infarction but new since prior 2020 study  · Patient has a history of migraines which have been well controlled on daily topiramate. Current headache is full head rather than one sided like her typical migraine and has associated photophobia. · Patient was given Decadron, Benadryl, magnesium sulfate in ED for which she reports some relief. Given benadryl and Mg again for continued HA without relief. Holding decadron in hyperglycemia. · Examination without any neurological deficits      · Plan:  · Continue migraine prophylaxis PTA topiramate and cocktail (benadryl, reglan, toradol); avoid opioids for HA  · 1 time dose of Depacon 500mg IV today. If this controls HA pt can be discharged on depakote taper 250mg BID x3d -> 250mg QD x2d  · Blood glucose control as below  · Avoid overcorrection of blood pressure  · Neurology consulted and signed off.  Plan for neuro follow up as outpatient    Insulin-dependent diabetes mellitus with neuropathy  Assessment & Plan  Lab Results   Component Value Date    HGBA1C 9.9 (H) 08/25/2023       Recent Labs     08/25/23  0356 08/25/23  0603 08/25/23  0806 08/25/23  1027   POCGLU 300* 199* 182* 208*       Blood Sugar Average: Last 72 hrs:  (P) [id number]     · Poorly controlled blood sugars per chart review  · Home regimen with metformin 1000 mg daily and Trulicity 1.5 mg q. weekly, insulin glargine 45 units nightly, and sliding scale correction  · Patient was given Decadron during ED encounter for headache    Plan:  · Continue gabapentin for neuropathy  · Hypoglycemic protocol  · Endocrinology consulted; will continue on insulin drip pending recommendations  · Hold metformin and Trulicity  · Sliding scale insulin algorithm #2  · Accu-Cheks  · Blood glucose goal less than 180  · Diabetic diet      Essential hypertension  Assessment & Plan  · Patient blood pressure upon initial evaluation was elevated 160/110  · Home regimen with Minipress 1 mg p.o. nightly and lisinopril    Plan:  · Continue home regimen  · Optimize pain control  · Continue monitoring vital signs/BP  · Adjust regimen accordingly    Atypical chest pain  Assessment & Plan  · Patient reported experiencing chest tightness/chest pain  · Cardiac work-up in ED was negative for any signs of ischemia/no elevation in troponin, EKG NSR  · Sees cardiology as outpatient  · Reports having a history of palpitations  · Per cardiology patient will have 1 week of Zio patch and echocardiogram  · We will continue to monitor off telemetry as patient is currently wearing a monitor at this time  · Plan for outpatient cardiology follow up as planned    S/P AKA (above knee amputation) unilateral, left ([address])  Assessment & Plan  · Denies any pain on her stump  · Continue Xarelto and statin    Morbid obesity  Assessment & Plan  Body mass index is 53.15 kg/m². Opportunity for lifestyle discussion this AM. Discussed the role lifestyle has played in overall health thus far. Emphasized focusing on a few things at a time, starting with avoiding drinking calories and increasing natural fiber intake, discussed what this would look like in pt's life. SDH limitations discussed with solution-oriented framing practiced. Additional discussion to be ongoing ideally with clinic follow up.     Bipolar disorder  Assessment & Plan  Continue Topamax and prazosin  Pt self-discontinued seroquel  Continue outpatient follow up with psychiatrist    COPD (chronic obstructive pulmonary disease) ([address] [facility])  Assessment & Plan  · Continue albuterol inhaler and fluticasone-vilanterol 200-25 mcg/actuation 1 puff  · Goal SPO2 greater than 88% while asleep greater than 90% while awake  · Continue to monitor vital signs  · Follow up with pulmonology as outpatient           VTE Pharmacologic Prophylaxis:  Xarelto 20 QD    Patient Centered Rounds: I performed bedside rounds with nursing staff today. Discussions with Specialists or Other Care Team Provider: Dr Celeste Wesley    Education and Discussions with Family / Patient: Updated  (wife) via phone. Current Length of Stay: 0 day(s)  Current Patient Status: Inpatient   Discharge Plan: Anticipate discharge in 24-48 hrs to home. Code Status: Level 1 - Full Code    Subjective:   Pt was seen this AM before rounds. Reports full frontal HA with photophobia. States she always has photophobia. This is not like her typical migraines and has been well controlled on topamax for a while. She reports as far as relief this has been relieved mostly by decadron last night. She denies abd pain, mskpain, endorses some chest tightness for which she has a monitor in place and cardiology follow up planned. All questions answered. Objective:     Vitals:   Temp (24hrs), Av.1 °F (36.7 °C), Min:97.9 °F (36.6 °C), Max:98.3 °F (36.8 °C)    Temp:  [97.9 °F (36.6 °C)-98.3 °F (36.8 °C)] 98.2 °F (36.8 °C)  HR:  [] 98  Resp:  [16-29] 24  BP: (126-159)/(63-87) 140/81  SpO2:  [95 %-100 %] 96 %  Body mass index is 53.15 kg/m². Input and Output Summary (last 24 hours): Intake/Output Summary (Last 24 hours) at 2023 1521  Last data filed at 2023 1101  Gross per 24 hour   Intake 1076.37 ml   Output 2275 ml   Net -1198.63 ml       Physical Exam:   Physical Exam  Vitals and nursing note reviewed. Constitutional:       General: She is in acute distress.       Appearance: She is well-developed. She is not ill-appearing. HENT:      Head: Normocephalic and atraumatic. Nose: No congestion or rhinorrhea. Mouth/Throat:      Mouth: Mucous membranes are moist.      Pharynx: Oropharynx is clear. Eyes:      General: No scleral icterus. Conjunctiva/sclera: Conjunctivae normal.   Cardiovascular:      Rate and Rhythm: Normal rate and regular rhythm. Pulses: Normal pulses. Heart sounds: Normal heart sounds. No murmur heard. Pulmonary:      Effort: Pulmonary effort is normal. No respiratory distress. Breath sounds: Normal breath sounds. Abdominal:      Palpations: Abdomen is soft. Tenderness: There is no abdominal tenderness. Musculoskeletal:         General: No swelling. Cervical back: Neck supple. Right lower leg: No edema. Comments: L AKA   Skin:     General: Skin is warm and dry. Capillary Refill: Capillary refill takes less than 2 seconds. Neurological:      General: No focal deficit present. Mental Status: She is alert and oriented to person, place, and time.    Psychiatric:         Mood and Affect: Mood normal.          Additional Data:     Labs:  Results from last 7 days   Lab Units 08/25/23  0611   WBC Thousand/uL 15.03*   HEMOGLOBIN g/dL 10.4*   HEMATOCRIT % 31.3*   PLATELETS Thousands/uL 435*   NEUTROS PCT % 88*   LYMPHS PCT % 8*   MONOS PCT % 3*   EOS PCT % 0     Results from last 7 days   Lab Units 08/25/23  0611   SODIUM mmol/L 134*   POTASSIUM mmol/L 3.6   CHLORIDE mmol/L 104   CO2 mmol/L 25   BUN mg/dL 13   CREATININE mg/dL 0.46*   ANION GAP mmol/L 5   CALCIUM mg/dL 8.4   ALBUMIN g/dL 3.3*   TOTAL BILIRUBIN mg/dL 0.29   ALK PHOS U/L 107*   ALT U/L 22   AST U/L 13   GLUCOSE RANDOM mg/dL 191*     Results from last 7 days   Lab Units 08/25/23  0611   INR  1.06     Results from last 7 days   Lab Units 08/25/23  1245 08/25/23  1027 08/25/23  0806 08/25/23  0603 08/25/23  0356 08/25/23  0203 08/24/23  2352 08/24/23  2248 08/24/23  2141 08/24/23  2135 08/24/23 1953   POC GLUCOSE mg/dl 205* 208* 182* 199* 300* 405* >500* >500* >500* >500* 82     Results from last 7 days   Lab Units 08/25/23  0028   HEMOGLOBIN A1C % 9.9*           Lines/Drains:  Invasive Devices     Peripheral Intravenous Line  Duration           Peripheral IV 08/24/23 Right Antecubital <1 day    Peripheral IV 08/25/23 Left;Ventral (anterior) Forearm <1 day                      Imaging: Reviewed radiology reports from this admission including: chest xray and CT head    Recent Cultures (last 7 days):         Last 24 Hours Medication List:   Current Facility-Administered Medications   Medication Dose Route Frequency Provider Last Rate   • acetaminophen  975 mg Oral Q6H PRN Kaylee Rodriguez MD     • albuterol  2 puff Inhalation Q4H PRN Kaylee Rodriguez MD     • aspirin  81 mg Oral Daily Kaylee Rodriguez MD     • atorvastatin  40 mg Oral Daily With Martita Hamilton MD     • co-enzyme Q-10  30 mg Oral Daily Camella Boas, MD     • cyclobenzaprine  10 mg Oral TID PRN Kaylee Rodriguez MD     • diphenhydrAMINE  25 mg Intravenous [facility] Kaylee Rodriguez MD     • DULoxetine  40 mg Oral Daily Kaylee Rodriguez MD     • fluticasone-vilanterol  1 puff Inhalation Daily Kaylee Rodriguez MD     • gabapentin  300 mg Oral TID Kaylee Rodriguez MD     • HYDROmorphone  4 mg Oral Q4H PRN Lorena Kwon MD     • hydrOXYzine HCL  50 mg Oral Q8H PRN Kaylee Rodriguez MD     • insulin glargine  35 Units Subcutaneous Q12H [address] Valerie Madrigal MD     • [START ON 8/26/2023] insulin lispro  2-12 Units Subcutaneous 4x Daily (AC & HS) Valerie Madrigal MD     • insulin lispro  25 Units Subcutaneous TID With Meals Valerie Madrigal MD     • insulin regular (HumuLIN R,NovoLIN R) 1 Units/mL in sodium chloride 0.9 % 100 mL infusion  0.3-21 Units/hr Intravenous Titrated Valerie Madrigal MD 12 Units/hr (08/25/23 1142)   • ketorolac  30 mg Intravenous [facility] Fareed Rodriguez MD     • magnesium sulfate  2 g Intravenous Q24H [address] Fareed Rodriguez MD 2 g (08/25/23 5872)   • metoclopramide  10 mg Intravenous [facility] Fareed Rodriguez MD     • oxyCODONE  5 mg Oral Q4H PRN Samson Goddard MD     • oxyCODONE  2.5 mg Oral Q4H PRN Samson Goddard MD     • prazosin  1 mg Oral HS Fareed Rodriguez MD     • rivaroxaban  20 mg Oral Daily With Breakfast Fareed Rodriguez MD     • sodium chloride  100 mL/hr Intravenous Once Fareed Rodriguez MD     • topiramate  25 mg Oral BID Fareed Rodriguez MD          Today, Patient Was Seen By: Samson Goddard MD    **Please Note: This note may have been constructed using a voice recognition system. **

## 2023-08-26 LAB
ANION GAP SERPL CALCULATED.3IONS-SCNC: 8 MMOL/L
BASOPHILS # BLD AUTO: 0.02 THOUSANDS/ÂΜL (ref 0–0.1)
BASOPHILS NFR BLD AUTO: 0 % (ref 0–1)
BUN SERPL-MCNC: 18 MG/DL (ref 5–25)
CALCIUM SERPL-MCNC: 7.7 MG/DL (ref 8.4–10.2)
CHLORIDE SERPL-SCNC: 107 MMOL/L (ref 96–108)
CO2 SERPL-SCNC: 21 MMOL/L (ref 21–32)
CREAT SERPL-MCNC: 0.5 MG/DL (ref 0.6–1.3)
EOSINOPHIL # BLD AUTO: 0.11 THOUSAND/ÂΜL (ref 0–0.61)
EOSINOPHIL NFR BLD AUTO: 1 % (ref 0–6)
ERYTHROCYTE [DISTWIDTH] IN BLOOD BY AUTOMATED COUNT: 19.7 % (ref 11.6–15.1)
FERRITIN SERPL-MCNC: 11 NG/ML (ref 11–307)
GFR SERPL CREATININE-BSD FRML MDRD: 124 ML/MIN/1.73SQ M
GLUCOSE SERPL-MCNC: 119 MG/DL (ref 65–140)
GLUCOSE SERPL-MCNC: 129 MG/DL (ref 65–140)
GLUCOSE SERPL-MCNC: 153 MG/DL (ref 65–140)
GLUCOSE SERPL-MCNC: 189 MG/DL (ref 65–140)
GLUCOSE SERPL-MCNC: 213 MG/DL (ref 65–140)
GLUCOSE SERPL-MCNC: 274 MG/DL (ref 65–140)
GLUCOSE SERPL-MCNC: 93 MG/DL (ref 65–140)
HCT VFR BLD AUTO: 24.4 % (ref 34.8–46.1)
HGB BLD-MCNC: 7.7 G/DL (ref 11.5–15.4)
IMM GRANULOCYTES # BLD AUTO: 0.07 THOUSAND/UL (ref 0–0.2)
IMM GRANULOCYTES NFR BLD AUTO: 1 % (ref 0–2)
IRON SATN MFR SERPL: 9 % (ref 15–50)
IRON SERPL-MCNC: 24 UG/DL (ref 50–212)
LYMPHOCYTES # BLD AUTO: 3.28 THOUSANDS/ÂΜL (ref 0.6–4.47)
LYMPHOCYTES NFR BLD AUTO: 31 % (ref 14–44)
MCH RBC QN AUTO: 21.3 PG (ref 26.8–34.3)
MCHC RBC AUTO-ENTMCNC: 31.6 G/DL (ref 31.4–37.4)
MCV RBC AUTO: 68 FL (ref 82–98)
MONOCYTES # BLD AUTO: 0.49 THOUSAND/ÂΜL (ref 0.17–1.22)
MONOCYTES NFR BLD AUTO: 5 % (ref 4–12)
NEUTROPHILS # BLD AUTO: 6.55 THOUSANDS/ÂΜL (ref 1.85–7.62)
NEUTS SEG NFR BLD AUTO: 62 % (ref 43–75)
NRBC BLD AUTO-RTO: 0 /100 WBCS
PLATELET # BLD AUTO: 273 THOUSANDS/UL (ref 149–390)
PMV BLD AUTO: 10.1 FL (ref 8.9–12.7)
POTASSIUM SERPL-SCNC: 3.6 MMOL/L (ref 3.5–5.3)
RBC # BLD AUTO: 3.61 MILLION/UL (ref 3.81–5.12)
SODIUM SERPL-SCNC: 136 MMOL/L (ref 135–147)
TIBC SERPL-MCNC: 267 UG/DL (ref 250–450)
UIBC SERPL-MCNC: 243 UG/DL (ref 155–355)
WBC # BLD AUTO: 10.52 THOUSAND/UL (ref 4.31–10.16)

## 2023-08-26 PROCEDURE — 86341 ISLET CELL ANTIBODY: CPT | Performed by: FAMILY MEDICINE

## 2023-08-26 PROCEDURE — 83550 IRON BINDING TEST: CPT | Performed by: FAMILY MEDICINE

## 2023-08-26 PROCEDURE — 83519 RIA NONANTIBODY: CPT | Performed by: FAMILY MEDICINE

## 2023-08-26 PROCEDURE — 94660 CPAP INITIATION&MGMT: CPT

## 2023-08-26 PROCEDURE — 83540 ASSAY OF IRON: CPT | Performed by: FAMILY MEDICINE

## 2023-08-26 PROCEDURE — 82948 REAGENT STRIP/BLOOD GLUCOSE: CPT

## 2023-08-26 PROCEDURE — 99232 SBSQ HOSP IP/OBS MODERATE 35: CPT | Performed by: INTERNAL MEDICINE

## 2023-08-26 PROCEDURE — 85025 COMPLETE CBC W/AUTO DIFF WBC: CPT | Performed by: FAMILY MEDICINE

## 2023-08-26 PROCEDURE — 82728 ASSAY OF FERRITIN: CPT | Performed by: FAMILY MEDICINE

## 2023-08-26 PROCEDURE — 80048 BASIC METABOLIC PNL TOTAL CA: CPT | Performed by: FAMILY MEDICINE

## 2023-08-26 PROCEDURE — 94760 N-INVAS EAR/PLS OXIMETRY 1: CPT

## 2023-08-26 PROCEDURE — 97163 PT EVAL HIGH COMPLEX 45 MIN: CPT

## 2023-08-26 RX ORDER — LANOLIN ALCOHOL/MO/W.PET/CERES
3 CREAM (GRAM) TOPICAL
Status: DISCONTINUED | OUTPATIENT
Start: 2023-08-26 | End: 2023-08-28 | Stop reason: HOSPADM

## 2023-08-26 RX ORDER — DIPHENHYDRAMINE HYDROCHLORIDE 50 MG/ML
25 INJECTION INTRAMUSCULAR; INTRAVENOUS EVERY 8 HOURS SCHEDULED
Status: DISCONTINUED | OUTPATIENT
Start: 2023-08-26 | End: 2023-08-26

## 2023-08-26 RX ORDER — METOCLOPRAMIDE HYDROCHLORIDE 5 MG/ML
10 INJECTION INTRAMUSCULAR; INTRAVENOUS EVERY 8 HOURS SCHEDULED
Status: DISCONTINUED | OUTPATIENT
Start: 2023-08-26 | End: 2023-08-26

## 2023-08-26 RX ORDER — KETOROLAC TROMETHAMINE 30 MG/ML
30 INJECTION, SOLUTION INTRAMUSCULAR; INTRAVENOUS EVERY 8 HOURS SCHEDULED
Status: DISCONTINUED | OUTPATIENT
Start: 2023-08-26 | End: 2023-08-26

## 2023-08-26 RX ORDER — SUMATRIPTAN 6 MG/.5ML
6 INJECTION, SOLUTION SUBCUTANEOUS ONCE
Status: COMPLETED | OUTPATIENT
Start: 2023-08-26 | End: 2023-08-26

## 2023-08-26 RX ORDER — SUMATRIPTAN 6 MG/.5ML
6 INJECTION, SOLUTION SUBCUTANEOUS ONCE
Status: CANCELLED | OUTPATIENT
Start: 2023-08-26 | End: 2023-08-26

## 2023-08-26 RX ADMIN — INSULIN LISPRO 20 UNITS: 100 INJECTION, SOLUTION INTRAVENOUS; SUBCUTANEOUS at 12:04

## 2023-08-26 RX ADMIN — SUMATRIPTAN 6 MG: 6 INJECTION, SOLUTION SUBCUTANEOUS at 17:31

## 2023-08-26 RX ADMIN — INSULIN LISPRO 2 UNITS: 100 INJECTION, SOLUTION INTRAVENOUS; SUBCUTANEOUS at 12:04

## 2023-08-26 RX ADMIN — RIVAROXABAN 20 MG: 20 TABLET, FILM COATED ORAL at 08:37

## 2023-08-26 RX ADMIN — KETOROLAC TROMETHAMINE 30 MG: 30 INJECTION, SOLUTION INTRAMUSCULAR; INTRAVENOUS at 07:56

## 2023-08-26 RX ADMIN — ASPIRIN 81 MG 81 MG: 81 TABLET ORAL at 08:38

## 2023-08-26 RX ADMIN — GABAPENTIN 300 MG: 300 CAPSULE ORAL at 08:38

## 2023-08-26 RX ADMIN — ACETAMINOPHEN 975 MG: 325 TABLET, FILM COATED ORAL at 13:11

## 2023-08-26 RX ADMIN — METOCLOPRAMIDE 10 MG: 5 INJECTION, SOLUTION INTRAMUSCULAR; INTRAVENOUS at 07:55

## 2023-08-26 RX ADMIN — TOPIRAMATE 25 MG: 25 TABLET ORAL at 17:31

## 2023-08-26 RX ADMIN — INSULIN GLARGINE 30 UNITS: 100 INJECTION, SOLUTION SUBCUTANEOUS at 08:37

## 2023-08-26 RX ADMIN — TOPIRAMATE 25 MG: 25 TABLET ORAL at 08:38

## 2023-08-26 RX ADMIN — METOCLOPRAMIDE 10 MG: 5 INJECTION, SOLUTION INTRAMUSCULAR; INTRAVENOUS at 13:12

## 2023-08-26 RX ADMIN — KETOROLAC TROMETHAMINE 30 MG: 30 INJECTION, SOLUTION INTRAMUSCULAR at 19:22

## 2023-08-26 RX ADMIN — INSULIN LISPRO 20 UNITS: 100 INJECTION, SOLUTION INTRAVENOUS; SUBCUTANEOUS at 08:37

## 2023-08-26 RX ADMIN — METOCLOPRAMIDE HYDROCHLORIDE 10 MG: 5 INJECTION INTRAMUSCULAR; INTRAVENOUS at 19:22

## 2023-08-26 RX ADMIN — FLUTICASONE FUROATE AND VILANTEROL TRIFENATATE 1 PUFF: 200; 25 POWDER RESPIRATORY (INHALATION) at 08:40

## 2023-08-26 RX ADMIN — INSULIN LISPRO 4 UNITS: 100 INJECTION, SOLUTION INTRAVENOUS; SUBCUTANEOUS at 17:31

## 2023-08-26 RX ADMIN — KETOROLAC TROMETHAMINE 30 MG: 30 INJECTION, SOLUTION INTRAMUSCULAR; INTRAVENOUS at 13:11

## 2023-08-26 RX ADMIN — INSULIN LISPRO 6 UNITS: 100 INJECTION, SOLUTION INTRAVENOUS; SUBCUTANEOUS at 22:31

## 2023-08-26 RX ADMIN — DIPHENHYDRAMINE HYDROCHLORIDE 25 MG: 50 INJECTION INTRAMUSCULAR; INTRAVENOUS at 19:22

## 2023-08-26 RX ADMIN — PRAZOSIN HYDROCHLORIDE 1 MG: 1 CAPSULE ORAL at 22:31

## 2023-08-26 RX ADMIN — ATORVASTATIN CALCIUM 40 MG: 40 TABLET, FILM COATED ORAL at 16:03

## 2023-08-26 RX ADMIN — MAGNESIUM SULFATE HEPTAHYDRATE 2 G: 40 INJECTION, SOLUTION INTRAVENOUS at 08:36

## 2023-08-26 RX ADMIN — SUMATRIPTAN 6 MG: 6 INJECTION, SOLUTION SUBCUTANEOUS at 12:36

## 2023-08-26 RX ADMIN — INSULIN GLARGINE 30 UNITS: 100 INJECTION, SOLUTION SUBCUTANEOUS at 20:23

## 2023-08-26 RX ADMIN — DIPHENHYDRAMINE HYDROCHLORIDE 25 MG: 50 INJECTION, SOLUTION INTRAMUSCULAR; INTRAVENOUS at 13:12

## 2023-08-26 RX ADMIN — DULOXETINE HYDROCHLORIDE 40 MG: 20 CAPSULE, DELAYED RELEASE ORAL at 10:25

## 2023-08-26 RX ADMIN — ACETAMINOPHEN 975 MG: 325 TABLET, FILM COATED ORAL at 19:21

## 2023-08-26 RX ADMIN — INSULIN LISPRO 20 UNITS: 100 INJECTION, SOLUTION INTRAVENOUS; SUBCUTANEOUS at 17:31

## 2023-08-26 RX ADMIN — GABAPENTIN 300 MG: 300 CAPSULE ORAL at 16:03

## 2023-08-26 RX ADMIN — DIPHENHYDRAMINE HYDROCHLORIDE 25 MG: 50 INJECTION, SOLUTION INTRAMUSCULAR; INTRAVENOUS at 07:56

## 2023-08-26 RX ADMIN — NICOTINE 14 MG: 14 PATCH, EXTENDED RELEASE TRANSDERMAL at 08:39

## 2023-08-26 RX ADMIN — GABAPENTIN 300 MG: 300 CAPSULE ORAL at 20:23

## 2023-08-26 RX ADMIN — Medication 30 MG: at 08:37

## 2023-08-26 NOTE — PLAN OF CARE
Problem: MOBILITY - ADULT  Goal: Maintain or return to baseline ADL function  Description: INTERVENTIONS:  -  Assess patient's ability to carry out ADLs; assess patient's baseline for ADL function and identify physical deficits which impact ability to perform ADLs (bathing, care of mouth/teeth, toileting, grooming, dressing, etc.)  - Assess/evaluate cause of self-care deficits   - Assess range of motion  - Assess patient's mobility; develop plan if impaired  - Assess patient's need for assistive devices and provide as appropriate  - Encourage maximum independence but intervene and supervise when necessary  - Involve family in performance of ADLs  - Assess for home care needs following discharge   - Consider OT consult to assist with ADL evaluation and planning for discharge  - Provide patient education as appropriate  Outcome: Progressing  Goal: Maintains/Returns to pre admission functional level  Description: INTERVENTIONS:  - Perform BMAT or MOVE assessment daily.   - Set and communicate daily mobility goal to care team and patient/family/caregiver.    - Collaborate with rehabilitation services on mobility goals if consulted  - Stand patient 2 times a day  - Out of bed to chair 1 times a day   - Out of bed for meals 1 times a day  - Out of bed for toileting  - Record patient progress and toleration of activity level   Outcome: Progressing

## 2023-08-26 NOTE — ASSESSMENT & PLAN NOTE
Blood Pressure: 130/86      · Patient blood pressure upon initial evaluation was elevated 160/110  · Home regimen with Minipress 1 mg p.o. nightly and lisinopril    Plan:  · Continue home regimen  · Optimize pain control  · Continue monitoring vital signs/BP  · Adjust regimen accordingly

## 2023-08-26 NOTE — PLAN OF CARE
Problem: PHYSICAL THERAPY ADULT  Goal: Performs mobility at highest level of function for planned discharge setting. See evaluation for individualized goals. Description: Treatment/Interventions: Functional transfer training, LE strengthening/ROM, Therapeutic exercise, Endurance training, Patient/family training, Equipment eval/education, Bed mobility, Gait training  Equipment Recommended:  (?drop arm bedside commode (standard size) if rated for pt's weight)       See flowsheet documentation for full assessment, interventions and recommendations. Note:    Problem List: Decreased strength, Decreased endurance, Impaired balance, Decreased mobility, Decreased safety awareness, Obesity, Pain  Assessment: Pt is a 40 y.o. female seen for PT evaluation s/p admit to [facility] on 8/18/2022 w/ Headache. Order placed for PT. Comorbidities affecting pt's physical performance at time of assessment include: DM, HTN, obesity, COPD, neuropathy and LLE AKA. Personal factors affecting pt at time of IE include: inability to perform ADLs and inability to ambulate household distances. Prior to admission, pt was required A for mobility, lived in one floor environment and lived with wife. Upon evaluation: Pt requires min A for bed mobility, min/CGA for sit to stand, and min/CGA for SPT with RW. (Please find full objective findings from PT assessment regarding body systems outlined above). Impairments and limitations also listed above, especially due to  weakness, impaired balance, decreased endurance, pain, decreased activity tolerance, decreased safety awareness and fall risk. Pt's clinical presentation is currently unstable/unpredictable seen in pt's presentation of fall risk, significant decline in functional mobility compared to baseline, and impulsivity. Pt to benefit from continued skilled PT tx while in hospital and upon DC to address deficits as defined above and maximize level of functional mobility.  From PT/mobility standpoint, recommendation at time of d/c would be home PT vs OPPT pending ability to obtain transportation to appointments. Recommend progression of transfers and ambulation as appropriate and as prosthesis is available. PT Discharge Recommendation: (S) Home with outpatient rehabilitation (vs. HHPT pending transportation availability)    See flowsheet documentation for full assessment.

## 2023-08-26 NOTE — ASSESSMENT & PLAN NOTE
Lab Results   Component Value Date    HGBA1C 9.9 (H) 08/25/2023       Recent Labs     08/26/23  0239 08/26/23  0747 08/26/23  1108 08/26/23  1543   POCGLU 129 119 189* 213*       Blood Sugar Average: Last 72 hrs:  (P) [id number]     · Poorly controlled blood sugars per chart review  · Home regimen with metformin 1000 mg daily and Trulicity 1.5 mg q. weekly, insulin glargine 45 units nightly, and sliding scale correction  · S/p Decadron during ED encounter for headache    Plan:  · Continue gabapentin for neuropathy  · Hypoglycemic protocol  · Endocrinology consulted; appreciate recs  · Hold home antihyperglycemics metformin and Trulicity  · Sliding scale insulin algorithm #2  · Accu-Cheks  · Blood glucose goal 140- 180 while inpatient  · Diabetic diet

## 2023-08-26 NOTE — ASSESSMENT & PLAN NOTE
Body mass index is 53.07 kg/m². Body mass index is 53.07 kg/m².     • Recommend incorporating a more whole foods plant-predominant diet along with decreasing consumption of red meats and processed foods  • Per AHA guidelines, recommend moderate-vigorous intensity exercise for 30 minutes a day for 5 days a week or a total of 150 min/week

## 2023-08-26 NOTE — QUICK NOTE
Contacted by primary team early afternoon regarding persistent headache, blurry vision, and possible extraocular movement (EOM) abnormality    Patient was seen and examined. She has blurry vision only when she has not had her eye glasses on. With glasses, her vision is clear. Her EOM is normal. She reports ongoing head pressure 9/10 located in the central and frontal part of the head. Toradol and benadryl helped her sleep and reduced head pressure sensation; however, a few mins after waking up from sleeping or napping, she has head pressure again. Concerning for uncontrolled obstructive sleep apnea. She has hx of ANNA but likely not on CPAP. -Recommend Sumatriptan 6mg subcu x1. Can give her another round of migraine cocktail if needed since toradol and reglan helped  -Have CPAP on whenever she takes a nap or sleeps  -Will need to follow up with sleep medicine outpatient  -Please contact neurology with further questions or concerns    Geovany Ramirez will need follow up in 4-6 weeks with headache attending/resident or AP. She will not require outpatient neurological testing.

## 2023-08-26 NOTE — ASSESSMENT & PLAN NOTE
POA 39 yo morbidly obese F w/PMHx migraines, HTN, T2DM, HLD, presenting with severe persistent frontotemporal HA with 9/10 or 10/10 pain. Associated w/?blurry vision and nausea. States this AM (8/26) that nothing we have given her this admission has been effective. Reports this does not feel like her migraines which are normally unilateral. Examination this AM with patient unable to gaze to the R. Neuro exam was otherwise unremarkable, however. I promptly left the room and discussed with on call Neuro resident about concern for pseudotumor cerebri. Upon my reexamination of patient 5 minutes later, however, EOM were intact. · On admission imaging with CT head showed no acute intracranial normalities; hypoattenuation left lentiform nucleus and corresponding left frontal horn and dilatation concerning for chronic infarction but new since prior 2020 study  · Patient was given Decadron, Benadryl, magnesium sulfate in ED for which she initially reported some relief. Holding decadron in hyperglycemia.     · Plan:  · Continue migraine prophylaxis PTA topiramate and cocktail (benadryl, reglan, toradol); avoid opioids for HA  · Per neuro recommendations; sumatriptan subcutaneous 6 mg x1, CPAP qhs  · Blood glucose control as below  · VS per unit routine

## 2023-08-26 NOTE — PROGRESS NOTES
[address] [facility]  Progress Note  Name: Katya Dubois  MRN: [id number]  Unit/Bed#: W -01 I Date of Admission: 8/24/2023   Date of Service: 8/26/2023 I Hospital Day: 1    Assessment/Plan   * Headache  Assessment & Plan  POA 39 yo morbidly obese F w/PMHx migraines, HTN, T2DM, HLD, presenting with severe persistent frontotemporal HA with 9/10 or 10/10 pain. Associated w/?blurry vision and nausea. States this AM (8/26) that nothing we have given her this admission has been effective. Reports this does not feel like her migraines which are normally unilateral. Examination this AM with patient unable to gaze to the R. Neuro exam was otherwise unremarkable, however. I promptly left the room and discussed with on call Neuro resident about concern for pseudotumor cerebri. Upon my reexamination of patient 5 minutes later, however, EOM were intact. · On admission imaging with CT head showed no acute intracranial normalities; hypoattenuation left lentiform nucleus and corresponding left frontal horn and dilatation concerning for chronic infarction but new since prior 2020 study  · Patient was given Decadron, Benadryl, magnesium sulfate in ED for which she initially reported some relief. Holding decadron in hyperglycemia.     · Plan:  · Continue migraine prophylaxis PTA topiramate and cocktail (benadryl, reglan, toradol); avoid opioids for HA  · Per neuro recommendations; sumatriptan subcutaneous 6 mg x1, CPAP qhs  · Blood glucose control as below  · VS per unit routine    Insulin-dependent diabetes mellitus with neuropathy  Assessment & Plan  Lab Results   Component Value Date    HGBA1C 9.9 (H) 08/25/2023       Recent Labs     08/26/23  0239 08/26/23  0747 08/26/23  1108 08/26/23  1543   POCGLU 129 119 189* 213*       Blood Sugar Average: Last 72 hrs:  (P) [id number]     · Poorly controlled blood sugars per chart review  · Home regimen with metformin 1000 mg daily and Trulicity 1.5 mg q. weekly, insulin glargine 45 units nightly, and sliding scale correction  · S/p Decadron during ED encounter for headache    Plan:  · Continue gabapentin for neuropathy  · Hypoglycemic protocol  · Endocrinology consulted; appreciate recs  · Hold home antihyperglycemics metformin and Trulicity  · Sliding scale insulin algorithm #2  · Accu-Cheks  · Blood glucose goal 140- 180 while inpatient  · Diabetic diet      S/P AKA (above knee amputation) unilateral, left (HCC)  Assessment & Plan  · Denies any pain on L AKA  · Continue Xarelto and statin    Morbid obesity  Assessment & Plan  Body mass index is 53.07 kg/m². Body mass index is 53.07 kg/m². • Recommend incorporating a more whole foods plant-predominant diet along with decreasing consumption of red meats and processed foods  • Per AHA guidelines, recommend moderate-vigorous intensity exercise for 30 minutes a day for 5 days a week or a total of 150 min/week        Bipolar disorder  Assessment & Plan  Continue Topamax and prazosin  Pt self-discontinued seroquel  Continue outpatient follow up with psychiatrist    COPD (chronic obstructive pulmonary disease) ([address] )  Assessment & Plan  · Continue albuterol inhaler and fluticasone-vilanterol 200-25 mcg/actuation 1 puff  · Goal SpO2 > 88% while asleep; > 90% while awake  · Continue to monitor vital signs  · Follow up with pulmonology as outpatient    Essential hypertension  Assessment & Plan  Blood Pressure: 130/86      · Patient blood pressure upon initial evaluation was elevated 160/110  · Home regimen with Minipress 1 mg p.o. nightly and lisinopril    Plan:  · Continue home regimen  · Optimize pain control  · Continue monitoring vital signs/BP  · Adjust regimen accordingly             VTE Pharmacologic Prophylaxis:     Moderate Risk (Score 3-4) - Pharmacological DVT Prophylaxis Ordered: Rivaroxaban (Xarelto).       Patient Centered Rounds: I have performed bedside rounds with nursing staff today.    Discussions with Specialists or Other Care Team Provider: Neurology, [facility] attending    Education and Discussions with Family / Patient: Updated  (wife) via phone. Current Length of Stay: 1 day(s)    Current Patient Status: Inpatient     Discharge Plan / Estimated Discharge Date: Anticipate discharge tomorrow to home. Code Status: Level 1 - Full Code      Subjective:   Pt reporting persistent 9/10 frontotemporal, retroorbital HA. Worse when she leans forward. + photophobia (room is currently dark, all lights off). When asked what has been helping her she says nothing, saying "all of these cocktails you keep giving me. ..nothing is helping."     Objective:     Vitals:   Temp (24hrs), Av.5 °F (36.9 °C), Min:98.1 °F (36.7 °C), Max:99 °F (37.2 °C)    Temp:  [98.1 °F (36.7 °C)-99 °F (37.2 °C)] 99 °F (37.2 °C)  HR:  [] 102  Resp:  [16-18] 18  BP: (130-139)/(76-87) 130/86  SpO2:  [93 %-98 %] 98 %  Body mass index is 53.07 kg/m². Input and Output Summary (last 24 hours): Intake/Output Summary (Last 24 hours) at 2023 1740  Last data filed at 2023 2000  Gross per 24 hour   Intake 59.93 ml   Output --   Net 59.93 ml       Physical Exam:     Physical Exam  Vitals and nursing note reviewed. Constitutional:       General: She is in acute distress. Appearance: She is well-developed. She is not ill-appearing. HENT:      Head: Normocephalic and atraumatic. Nose: No congestion or rhinorrhea. Mouth/Throat:      Mouth: Mucous membranes are moist.      Pharynx: Oropharynx is clear. Eyes:      General: No visual field deficit or scleral icterus. Conjunctiva/sclera: Conjunctivae normal.   Cardiovascular:      Rate and Rhythm: Normal rate and regular rhythm. Pulses: Normal pulses. Heart sounds: Normal heart sounds. No murmur heard. Pulmonary:      Effort: Pulmonary effort is normal. No respiratory distress.       Breath sounds: Normal breath sounds. Abdominal:      Palpations: Abdomen is soft. Tenderness: There is no abdominal tenderness. Musculoskeletal:         General: No swelling. Cervical back: Neck supple. Right lower leg: No edema. Comments: L AKA   Skin:     General: Skin is warm and dry. Capillary Refill: Capillary refill takes less than 2 seconds. Neurological:      General: No focal deficit present. Mental Status: She is alert and oriented to person, place, and time. GCS: GCS eye subscore is 4. GCS verbal subscore is 5. GCS motor subscore is 6. Cranial Nerves: No dysarthria or facial asymmetry. Sensory: Sensation is intact. No sensory deficit. Motor: Motor function is intact. Coordination: Coordination is intact. Comments: Upon initiation testing of extraocular motion pt appeared to be unable to track past midline when moving towards the R (pt stated "I am following your finger"). However this resolved after I left the room and returned 5 minutes later to repeat exam and all EOM were intact. Unclear if psychogenic vs true ophthalmoplegia.    Psychiatric:         Mood and Affect: Mood normal.          Additional Data:     Labs:  Results from last 7 days   Lab Units 08/26/23  0846   WBC Thousand/uL 10.52*   HEMOGLOBIN g/dL 7.7*   HEMATOCRIT % 24.4*   PLATELETS Thousands/uL 273   NEUTROS PCT % 62   LYMPHS PCT % 31   MONOS PCT % 5   EOS PCT % 1     Results from last 7 days   Lab Units 08/26/23  0846 08/25/23  0611   SODIUM mmol/L 136 134*   POTASSIUM mmol/L 3.6 3.6   CHLORIDE mmol/L 107 104   CO2 mmol/L 21 25   BUN mg/dL 18 13   CREATININE mg/dL 0.50* 0.46*   ANION GAP mmol/L 8 5   CALCIUM mg/dL 7.7* 8.4   ALBUMIN g/dL  --  3.3*   TOTAL BILIRUBIN mg/dL  --  0.29   ALK PHOS U/L  --  107*   ALT U/L  --  22   AST U/L  --  13   GLUCOSE RANDOM mg/dL 153* 191*     Results from last 7 days   Lab Units 08/25/23  0611   INR  1.06     Results from last 7 days   Lab Units 08/26/23  1543 08/26/23  1108 08/26/23  0747 08/26/23  0239 08/26/23  0058 08/25/23  2152 08/25/23  2008 08/25/23  1731 08/25/23  1524 08/25/23  1421 08/25/23  1245 08/25/23  1027   POC GLUCOSE mg/dl 213* 189* 119 129 93 158* 252* 246* 286* 255* 205* 208*     Results from last 7 days   Lab Units 08/25/23  0028   HEMOGLOBIN A1C % 9.9*           Imaging:   CT head without contrast   Final Result by Darling Méndez MD (08/24 1541)      No acute intracranial abnormality. Hypoattenuation left lentiform nucleus and corresponding left frontal horn ex vacuo dilatation concerning for chronic infarction but new since prior 2020 study. Consider follow-up nonemergent MRI of the brain especially    given the patient's age. The study was marked in EPIC for significant notification. Workstation performed: [id number]         XR chest 1 view portable   ED Interpretation by Zeenat Quiroz PA-C (08/24 1456)   No focal consolidation      Final Result by Serg Minor MD (08/24 1505)      No acute cardiopulmonary disease.                   Workstation performed: [id number]               Recent Cultures (last 7 days):           Lines/Drains:  Invasive Devices     Peripheral Intravenous Line  Duration           Peripheral IV 08/25/23 Left;Ventral (anterior) Forearm 1 day    Peripheral IV 08/26/23 Right;Upper Arm <1 day                Telemetry:        Last 24 Hours Medication List:   Current Facility-Administered Medications   Medication Dose Route Frequency Provider Last Rate   • acetaminophen  975 mg Oral Q6H PRN Samara Mendiola, DO     • albuterol  2 puff Inhalation Q4H PRN Samara Mendiola, DO     • aspirin  81 mg Oral Daily Johny Starcher Hunter, DO     • atorvastatin  40 mg Oral Daily With [employer], DO     • co-enzyme Q-10  30 mg Oral Daily Johny Starcher Hunter, DO     • cyclobenzaprine  10 mg Oral TID PRN Johny Harrison, DO     • diphenhydrAMINE  25 mg Intravenous [facility], DO     • DULoxetine  40 mg Oral Daily [place], [place]     • fluticasone-vilanterol  1 puff Inhalation Daily [place], [place]     • gabapentin  300 mg Oral TID Chinyere Harrison, DO     • HYDROmorphone  4 mg Oral Q4H PRN Chinyere Harrison, DO     • hydrOXYzine HCL  50 mg Oral Q8H PRN Chinyere Harrison, DO     • insulin glargine  30 Units Subcutaneous Q12H [facility], DO     • insulin lispro  2-12 Units Subcutaneous 4x Daily (AC & HS) Chinyere Harrison, DO     • insulin lispro  20 Units Subcutaneous TID With Meals Nucor MyFit, DO     • ketorolac  30 mg Intravenous [facility], DO     • lactulose  20 g Oral Daily Chinyere Harrison, DO     • magnesium sulfate  2 g Intravenous Q24H [facility] Chinyere Harrison DO 2 g (08/26/23 5088)   • metoclopramide  10 mg Intravenous Q8H [facility], DO     • nicotine  14 mg Transdermal Daily Chinyere Harrison, DO     • oxyCODONE  5 mg Oral Q4H PRN Chinyere Harrison, DO     • oxyCODONE  2.5 mg Oral Q4H PRN Chinyere Harrison, DO     • prazosin  1 mg Oral HS Medic [employer] MyFit, DO     • rivaroxaban  20 mg Oral Daily With Breakfast Chinyere Harrison, DO     • senna  1 tablet Oral HS Chinyere Harrison, DO     • sodium chloride  100 mL/hr Intravenous Once Nucor MyFit, DO     • topiramate  25 mg Oral BID Nucor MyFit, DO          Today, Patient Was Seen By: Susie Chatterjee MD    ** Please Note: This note has been constructed using a voice recognition system.  **

## 2023-08-26 NOTE — PHYSICAL THERAPY NOTE
PHYSICAL THERAPY EVALUATION  NAME: Vish Torres  AGE:   40 y.o. MRN:  [id number]  ADMIT DX: Chest pain [R07.9]  Intractable headache, unspecified chronicity pattern, unspecified headache type [R51.9]    PMH:   Past Medical History:   Diagnosis Date    Asthma     Atherosclerosis     Bipolar 1 disorder (HCC)     COPD (chronic obstructive pulmonary disease) ([facility])     Depression     Diabetes mellitus ([address])     Hypertension     Psychiatric disorder     cutting history    PTSD (post-traumatic stress disorder)     Schizoaffective disorder ([address])     Tendonitis      LENGTH OF STAY: 1       08/26/23 1315   PT Last Visit   PT Visit Date 08/26/23   Note Type   Note type Evaluation   Pain Assessment   Pain Assessment Tool 0-10   Pain Score 9   Pain Location/Orientation Location: Head   Hospital Pain Intervention(s) Repositioned; Ambulation/increased activity   Restrictions/Precautions   Weight Bearing Precautions Per Order No  (LLE AKA)   Braces or Orthoses Other (Comment)  (no prosthetic in room; requested pt to have wife bring in prosthetic for gait training)   Other Precautions Impulsive; Fall Risk;Pain  (LLE AKA)   Home Living   Type of Home Other (Comment)  (currently living in a motel)   Home Layout One level  (0 DOROTHY)   Bathroom Shower/Tub   (not accessible for WC or RW)   [employer]   (pt has been utilizing a bedpan due to no room for a bariatric BSC; pt requesting a smaller size commode)   [place]; Wheelchair-manual   Additional Comments Pt able to perform SPT with RW with mod I at baseline. Pt reports she has ambulated ~2x since receiving her prosthetic with HHPT (~30-50`). Pt has been requesting OPPT, however currently has no transportation for appointments. Reports primarily staying in bed due to lack of commode/toilet access. Prior Function   Level of [place] Needs assistance with ADLs; Needs assistance with functional mobility; Needs assistance with IADLS   Lives With Spouse  (wife) Receives Help From Family   IADLs Family/Friend/Other provides transportation; Family/Friend/Other provides meals; Family/Friend/Other provides medication management   General   Family/Caregiver Present No   Cognition   Overall Cognitive Status WFL   Arousal/Participation Cooperative   Orientation Level Oriented X4   Memory Within functional limits   Following Commands Follows all commands and directions without difficulty   Comments Pt identified by name and . Subjective   Subjective Agrees to PT evaluation and is cooperative throughout session. RLE Assessment   RLE Assessment X   Strength RLE   RLE Overall Strength 4/5  (functionally)   LLE Assessment   LLE Assessment X   Strength LLE   LLE Overall Strength   (full ROM; not strength tested; LLE AKA)   Bed Mobility   Supine to Sit 4  Minimal assistance   Additional items Assist x 1;HOB elevated; Bedrails; Increased time required;Verbal cues; Comment  (min A for scooting toward EOB only)   Sit to Supine Unable to assess  (left OOB in chair post session; RN ok with no chair alarm)   Transfers   Sit to Stand 4  Minimal assistance  (CGA)   Additional items Assist x 1;Verbal cues; Impulsive   Stand to Sit 5  Supervision   Additional items Increased time required;Verbal cues  (vc for hand placement)   Stand pivot 4  Minimal assistance  (CGA)   Additional items Assist x 1; Increased time required;Verbal cues  (able to hop on RLE to advance foot for transfer with RW)   Ambulation/Elevation   Gait pattern   (pt declined further hopping due to headache; requested family to bring in prosthesis for future treatments)   Balance   Static Sitting Good   Dynamic Sitting Fair +   Static Standing Fair -   Dynamic Standing Fair -   Endurance Deficit   Endurance Deficit Yes   Endurance Deficit Description limited standing tolerance, pain   Activity Tolerance   Activity Tolerance Patient limited by pain; Patient tolerated treatment well   Nurse Made Aware Per RN, pt appropriate to evaluate   Assessment   Problem List Decreased strength;Decreased endurance; Impaired balance;Decreased mobility; Decreased safety awareness; Obesity;Pain   Assessment Pt is a 40 y.o. female seen for PT evaluation s/p admit to [facility] on 8/18/2022 w/ Headache. Order placed for PT. Comorbidities affecting pt's physical performance at time of assessment include: DM, HTN, obesity, COPD, neuropathy and LLE AKA. Personal factors affecting pt at time of IE include: inability to perform ADLs and inability to ambulate household distances. Prior to admission, pt was required A for mobility, lived in one floor environment and lived with wife. Upon evaluation: Pt requires min A for bed mobility, min/CGA for sit to stand, and min/CGA for SPT with RW. (Please find full objective findings from PT assessment regarding body systems outlined above). Impairments and limitations also listed above, especially due to  weakness, impaired balance, decreased endurance, pain, decreased activity tolerance, decreased safety awareness and fall risk. Pt's clinical presentation is currently unstable/unpredictable seen in pt's presentation of fall risk, significant decline in functional mobility compared to baseline, and impulsivity. Pt to benefit from continued skilled PT tx while in hospital and upon DC to address deficits as defined above and maximize level of functional mobility. From PT/mobility standpoint, recommendation at time of d/c would be home PT vs OPPT pending ability to obtain transportation to appointments. Recommend progression of transfers and ambulation as appropriate and as prosthesis is available.    Goals   Patient Goals to go to OPPT/be able to use a commode at home   STG Expiration Date 09/05/23   Short Term Goal #1 Pt will be able to: (1) perform bed mobility with mod I to decrease caregiver burden (2) perform sit to stand with mod I to increase level of independence and promote safe toiletting and transfers (3) ambulate at least 100` with min A and prosthesis/least restrictive AD to increase activity tolerance and allow for safe community mobilization (4) increase standing balance by 1 grade to decrease risk of falls   PT Treatment Day 0   Plan   Treatment/Interventions Functional transfer training;LE strengthening/ROM; Therapeutic exercise; Endurance training;Patient/family training;Equipment eval/education; Bed mobility;Gait training   PT Frequency 3-5x/wk   Recommendation   PT Discharge Recommendation (S)  Home with outpatient rehabilitation  (vs. HHPT pending transportation availability)   Equipment Recommended   (?drop arm bedside commode (standard size) if rated for pt's weight)   [facility] Basic Mobility Inpatient   Turning in Flat Bed Without Bedrails 3   Lying on Back to Sitting on Edge of Flat Bed Without Bedrails 3   Moving Bed to Chair 3   Standing Up From Chair Using Arms 3   Walk in Room 1   Climb 3-5 Stairs With Railing 1   Basic Mobility Inpatient Raw Score 14   Basic Mobility Standardized Score 35.55   Highest Level Of Mobility   -[facility] Goal 4: Move to chair/commode   -HL Achieved 4: Move to chair/commode   End of Consult   Patient Position at End of Consult Bedside chair; All needs within reach     The patient's [facility] Basic Mobility Inpatient Short Form Raw Score is 14, Standardized Score is 35.55. A Raw Score of less than 16 suggests the patient may benefit from discharge to post-acute rehabilitation services, which DOES NOT coincide with CURRENT above PT recommendations. However please refer to therapist recommendation for discharge planning given other factors that may influence destination. Adapted from [employer] [facility] “6-Clicks” Basic Mobility and Daily Activity Scores With Discharge Destination. Physical Therapy, 2021;101:1-9.  DOI: [email or web address]      Marylee Rota, PT,DPT

## 2023-08-26 NOTE — ASSESSMENT & PLAN NOTE
· Continue albuterol inhaler and fluticasone-vilanterol 200-25 mcg/actuation 1 puff  · Goal SpO2 > 88% while asleep; > 90% while awake  · Continue to monitor vital signs  · Follow up with pulmonology as outpatient

## 2023-08-27 LAB
ANION GAP SERPL CALCULATED.3IONS-SCNC: 4 MMOL/L
ATRIAL RATE: 103 BPM
ATRIAL RATE: 83 BPM
BASOPHILS # BLD AUTO: 0.05 THOUSANDS/ÂΜL (ref 0–0.1)
BASOPHILS NFR BLD AUTO: 1 % (ref 0–1)
BUN SERPL-MCNC: 26 MG/DL (ref 5–25)
CALCIUM SERPL-MCNC: 7.9 MG/DL (ref 8.4–10.2)
CHLORIDE SERPL-SCNC: 106 MMOL/L (ref 96–108)
CO2 SERPL-SCNC: 27 MMOL/L (ref 21–32)
CREAT SERPL-MCNC: 0.45 MG/DL (ref 0.6–1.3)
EOSINOPHIL # BLD AUTO: 0.23 THOUSAND/ÂΜL (ref 0–0.61)
EOSINOPHIL NFR BLD AUTO: 2 % (ref 0–6)
ERYTHROCYTE [DISTWIDTH] IN BLOOD BY AUTOMATED COUNT: 20 % (ref 11.6–15.1)
GFR SERPL CREATININE-BSD FRML MDRD: 129 ML/MIN/1.73SQ M
GLUCOSE SERPL-MCNC: 181 MG/DL (ref 65–140)
GLUCOSE SERPL-MCNC: 189 MG/DL (ref 65–140)
GLUCOSE SERPL-MCNC: 214 MG/DL (ref 65–140)
GLUCOSE SERPL-MCNC: 227 MG/DL (ref 65–140)
GLUCOSE SERPL-MCNC: 255 MG/DL (ref 65–140)
HCT VFR BLD AUTO: 30.8 % (ref 34.8–46.1)
HGB BLD-MCNC: 10.1 G/DL (ref 11.5–15.4)
IMM GRANULOCYTES # BLD AUTO: 0.09 THOUSAND/UL (ref 0–0.2)
IMM GRANULOCYTES NFR BLD AUTO: 1 % (ref 0–2)
LYMPHOCYTES # BLD AUTO: 3.45 THOUSANDS/ÂΜL (ref 0.6–4.47)
LYMPHOCYTES NFR BLD AUTO: 34 % (ref 14–44)
MCH RBC QN AUTO: 21.4 PG (ref 26.8–34.3)
MCHC RBC AUTO-ENTMCNC: 32.8 G/DL (ref 31.4–37.4)
MCV RBC AUTO: 65 FL (ref 82–98)
MONOCYTES # BLD AUTO: 0.55 THOUSAND/ÂΜL (ref 0.17–1.22)
MONOCYTES NFR BLD AUTO: 5 % (ref 4–12)
NEUTROPHILS # BLD AUTO: 5.74 THOUSANDS/ÂΜL (ref 1.85–7.62)
NEUTS SEG NFR BLD AUTO: 57 % (ref 43–75)
NRBC BLD AUTO-RTO: 0 /100 WBCS
P AXIS: 54 DEGREES
P AXIS: 75 DEGREES
PLATELET # BLD AUTO: 380 THOUSANDS/UL (ref 149–390)
PMV BLD AUTO: 9.9 FL (ref 8.9–12.7)
POTASSIUM SERPL-SCNC: 4.1 MMOL/L (ref 3.5–5.3)
PR INTERVAL: 144 MS
PR INTERVAL: 150 MS
QRS AXIS: 41 DEGREES
QRS AXIS: 69 DEGREES
QRSD INTERVAL: 82 MS
QRSD INTERVAL: 86 MS
QT INTERVAL: 346 MS
QT INTERVAL: 360 MS
QTC INTERVAL: 423 MS
QTC INTERVAL: 453 MS
RBC # BLD AUTO: 4.72 MILLION/UL (ref 3.81–5.12)
SODIUM SERPL-SCNC: 137 MMOL/L (ref 135–147)
T WAVE AXIS: 16 DEGREES
T WAVE AXIS: 19 DEGREES
VENTRICULAR RATE: 103 BPM
VENTRICULAR RATE: 83 BPM
WBC # BLD AUTO: 10.11 THOUSAND/UL (ref 4.31–10.16)

## 2023-08-27 PROCEDURE — 94760 N-INVAS EAR/PLS OXIMETRY 1: CPT

## 2023-08-27 PROCEDURE — 93010 ELECTROCARDIOGRAM REPORT: CPT | Performed by: INTERNAL MEDICINE

## 2023-08-27 PROCEDURE — 99232 SBSQ HOSP IP/OBS MODERATE 35: CPT | Performed by: INTERNAL MEDICINE

## 2023-08-27 PROCEDURE — 80048 BASIC METABOLIC PNL TOTAL CA: CPT | Performed by: FAMILY MEDICINE

## 2023-08-27 PROCEDURE — 93005 ELECTROCARDIOGRAM TRACING: CPT

## 2023-08-27 PROCEDURE — 82948 REAGENT STRIP/BLOOD GLUCOSE: CPT

## 2023-08-27 PROCEDURE — 85025 COMPLETE CBC W/AUTO DIFF WBC: CPT | Performed by: FAMILY MEDICINE

## 2023-08-27 RX ORDER — DIPHENHYDRAMINE HCL 25 MG
25 TABLET ORAL ONCE
Status: COMPLETED | OUTPATIENT
Start: 2023-08-27 | End: 2023-08-27

## 2023-08-27 RX ORDER — MAGNESIUM SULFATE HEPTAHYDRATE 40 MG/ML
2 INJECTION, SOLUTION INTRAVENOUS
Status: DISCONTINUED | OUTPATIENT
Start: 2023-08-27 | End: 2023-08-28 | Stop reason: HOSPADM

## 2023-08-27 RX ORDER — KETOROLAC TROMETHAMINE 30 MG/ML
30 INJECTION, SOLUTION INTRAMUSCULAR; INTRAVENOUS EVERY 8 HOURS SCHEDULED
Status: DISCONTINUED | OUTPATIENT
Start: 2023-08-27 | End: 2023-08-28 | Stop reason: HOSPADM

## 2023-08-27 RX ORDER — SODIUM CHLORIDE 9 MG/ML
100 INJECTION, SOLUTION INTRAVENOUS ONCE
Status: COMPLETED | OUTPATIENT
Start: 2023-08-27 | End: 2023-08-27

## 2023-08-27 RX ORDER — METOCLOPRAMIDE 10 MG/1
10 TABLET ORAL ONCE
Status: COMPLETED | OUTPATIENT
Start: 2023-08-27 | End: 2023-08-27

## 2023-08-27 RX ORDER — METOCLOPRAMIDE HYDROCHLORIDE 5 MG/ML
10 INJECTION INTRAMUSCULAR; INTRAVENOUS EVERY 8 HOURS SCHEDULED
Status: DISCONTINUED | OUTPATIENT
Start: 2023-08-27 | End: 2023-08-28 | Stop reason: HOSPADM

## 2023-08-27 RX ORDER — IBUPROFEN 400 MG/1
400 TABLET ORAL ONCE
Status: COMPLETED | OUTPATIENT
Start: 2023-08-27 | End: 2023-08-27

## 2023-08-27 RX ORDER — DIPHENHYDRAMINE HYDROCHLORIDE 50 MG/ML
25 INJECTION INTRAMUSCULAR; INTRAVENOUS EVERY 8 HOURS SCHEDULED
Status: DISCONTINUED | OUTPATIENT
Start: 2023-08-27 | End: 2023-08-28 | Stop reason: HOSPADM

## 2023-08-27 RX ORDER — KETOTIFEN FUMARATE 0.35 MG/ML
1 SOLUTION/ DROPS OPHTHALMIC 2 TIMES DAILY PRN
Status: DISCONTINUED | OUTPATIENT
Start: 2023-08-27 | End: 2023-08-28 | Stop reason: HOSPADM

## 2023-08-27 RX ORDER — KETOROLAC TROMETHAMINE 30 MG/ML
30 INJECTION, SOLUTION INTRAMUSCULAR; INTRAVENOUS ONCE
Status: DISCONTINUED | OUTPATIENT
Start: 2023-08-27 | End: 2023-08-27

## 2023-08-27 RX ORDER — SUMATRIPTAN 6 MG/.5ML
6 INJECTION, SOLUTION SUBCUTANEOUS
Status: DISCONTINUED | OUTPATIENT
Start: 2023-08-27 | End: 2023-08-28 | Stop reason: HOSPADM

## 2023-08-27 RX ADMIN — Medication 30 MG: at 08:03

## 2023-08-27 RX ADMIN — INSULIN LISPRO 2 UNITS: 100 INJECTION, SOLUTION INTRAVENOUS; SUBCUTANEOUS at 08:05

## 2023-08-27 RX ADMIN — METOCLOPRAMIDE HYDROCHLORIDE 10 MG: 5 INJECTION INTRAMUSCULAR; INTRAVENOUS at 22:01

## 2023-08-27 RX ADMIN — INSULIN LISPRO 20 UNITS: 100 INJECTION, SOLUTION INTRAVENOUS; SUBCUTANEOUS at 12:36

## 2023-08-27 RX ADMIN — DIPHENHYDRAMINE HYDROCHLORIDE 25 MG: 50 INJECTION, SOLUTION INTRAMUSCULAR; INTRAVENOUS at 21:58

## 2023-08-27 RX ADMIN — METOCLOPRAMIDE HYDROCHLORIDE 10 MG: 5 INJECTION INTRAMUSCULAR; INTRAVENOUS at 15:45

## 2023-08-27 RX ADMIN — DIPHENHYDRAMINE HYDROCHLORIDE 25 MG: 25 TABLET ORAL at 09:31

## 2023-08-27 RX ADMIN — TOPIRAMATE 25 MG: 25 TABLET ORAL at 08:03

## 2023-08-27 RX ADMIN — GABAPENTIN 300 MG: 300 CAPSULE ORAL at 08:03

## 2023-08-27 RX ADMIN — KETOTIFEN FUMARATE 1 DROP: 0.25 SOLUTION/ DROPS OPHTHALMIC at 23:31

## 2023-08-27 RX ADMIN — GABAPENTIN 300 MG: 300 CAPSULE ORAL at 15:44

## 2023-08-27 RX ADMIN — NICOTINE 14 MG: 14 PATCH, EXTENDED RELEASE TRANSDERMAL at 08:04

## 2023-08-27 RX ADMIN — INSULIN LISPRO 4 UNITS: 100 INJECTION, SOLUTION INTRAVENOUS; SUBCUTANEOUS at 12:36

## 2023-08-27 RX ADMIN — Medication 3 MG: at 21:52

## 2023-08-27 RX ADMIN — IBUPROFEN 400 MG: 400 TABLET, FILM COATED ORAL at 10:10

## 2023-08-27 RX ADMIN — ASPIRIN 81 MG 81 MG: 81 TABLET ORAL at 08:03

## 2023-08-27 RX ADMIN — TOPIRAMATE 25 MG: 25 TABLET ORAL at 18:22

## 2023-08-27 RX ADMIN — ACETAMINOPHEN 975 MG: 325 TABLET, FILM COATED ORAL at 15:43

## 2023-08-27 RX ADMIN — INSULIN LISPRO 6 UNITS: 100 INJECTION, SOLUTION INTRAVENOUS; SUBCUTANEOUS at 16:52

## 2023-08-27 RX ADMIN — ATORVASTATIN CALCIUM 40 MG: 40 TABLET, FILM COATED ORAL at 15:44

## 2023-08-27 RX ADMIN — Medication 3 MG: at 00:00

## 2023-08-27 RX ADMIN — INSULIN GLARGINE 30 UNITS: 100 INJECTION, SOLUTION SUBCUTANEOUS at 21:52

## 2023-08-27 RX ADMIN — KETOROLAC TROMETHAMINE 30 MG: 30 INJECTION, SOLUTION INTRAMUSCULAR; INTRAVENOUS at 22:00

## 2023-08-27 RX ADMIN — DIPHENHYDRAMINE HYDROCHLORIDE 25 MG: 25 SOLUTION ORAL at 00:13

## 2023-08-27 RX ADMIN — DIPHENHYDRAMINE HYDROCHLORIDE 25 MG: 50 INJECTION, SOLUTION INTRAMUSCULAR; INTRAVENOUS at 15:45

## 2023-08-27 RX ADMIN — INSULIN LISPRO 20 UNITS: 100 INJECTION, SOLUTION INTRAVENOUS; SUBCUTANEOUS at 16:52

## 2023-08-27 RX ADMIN — PRAZOSIN HYDROCHLORIDE 1 MG: 1 CAPSULE ORAL at 21:54

## 2023-08-27 RX ADMIN — DULOXETINE HYDROCHLORIDE 40 MG: 20 CAPSULE, DELAYED RELEASE ORAL at 08:04

## 2023-08-27 RX ADMIN — INSULIN LISPRO 20 UNITS: 100 INJECTION, SOLUTION INTRAVENOUS; SUBCUTANEOUS at 08:05

## 2023-08-27 RX ADMIN — METOCLOPRAMIDE 10 MG: 10 TABLET ORAL at 09:31

## 2023-08-27 RX ADMIN — GABAPENTIN 300 MG: 300 CAPSULE ORAL at 21:52

## 2023-08-27 RX ADMIN — GLYCERIN 2 DROP: .002; .002; .01 SOLUTION/ DROPS OPHTHALMIC at 23:31

## 2023-08-27 RX ADMIN — SODIUM CHLORIDE 100 ML/HR: 0.9 INJECTION, SOLUTION INTRAVENOUS at 15:54

## 2023-08-27 RX ADMIN — INSULIN GLARGINE 30 UNITS: 100 INJECTION, SOLUTION SUBCUTANEOUS at 08:03

## 2023-08-27 RX ADMIN — INSULIN LISPRO 4 UNITS: 100 INJECTION, SOLUTION INTRAVENOUS; SUBCUTANEOUS at 21:52

## 2023-08-27 RX ADMIN — MAGNESIUM SULFATE HEPTAHYDRATE 2 G: 40 INJECTION, SOLUTION INTRAVENOUS at 16:00

## 2023-08-27 RX ADMIN — KETOROLAC TROMETHAMINE 30 MG: 30 INJECTION, SOLUTION INTRAMUSCULAR; INTRAVENOUS at 15:45

## 2023-08-27 NOTE — ASSESSMENT & PLAN NOTE
POA 39 yo morbidly obese F w/PMHx migraines, HTN, T2DM, HLD, presenting with severe persistent frontotemporal HA with 9/10 or 10/10 pain. Associated w/?blurry vision and nausea. States this AM (8/26) that nothing we have given her this admission has been effective. Reports this does not feel like her migraines which are normally unilateral. Examination this AM with patient unable to gaze to the R. Neuro exam was otherwise unremarkable, however. I promptly left the room and discussed with on call Neuro resident about concern for pseudotumor cerebri. Upon my reexamination of patient 5 minutes later, however, EOM were intact. · On admission imaging with CT head showed no acute intracranial normalities; hypoattenuation left lentiform nucleus and corresponding left frontal horn and dilatation concerning for chronic infarction but new since prior 2020 study  · Patient was given Decadron, Benadryl, magnesium sulfate in ED for which she initially reported some relief. Holding decadron in hyperglycemia. · Plan:  · Continue migraine prophylaxis PTA topiramate and cocktail (PO benadryl, reglan, toradol/motrin); avoid opioids for HA  · Per neuro recommendations; CPAP qhs  · CPAP had good effect on AM headache today 8/27;  Discharge with outpatient referral to sleep medicine  · Plan for LP w/ opening pressure Monday 8/28 as pt continues to have HA to r/o IIH; xarelto 20 qAM held  · Blood glucose control as below  · VS per unit routine

## 2023-08-27 NOTE — PROGRESS NOTES
[facility]  Progress Note  Name: Cayetano Muñoz  MRN: [id number]  Unit/Bed#: W -01 I Date of Admission: 8/24/2023   Date of Service: 8/27/2023 I Hospital Day: 2    Assessment/Plan   * Headache  Assessment & Plan  POA 39 yo morbidly obese F w/PMHx migraines, HTN, T2DM, HLD, presenting with severe persistent frontotemporal HA with 9/10 or 10/10 pain. Associated w/?blurry vision and nausea. States this AM (8/26) that nothing we have given her this admission has been effective. Reports this does not feel like her migraines which are normally unilateral. Examination this AM with patient unable to gaze to the R. Neuro exam was otherwise unremarkable, however. I promptly left the room and discussed with on call Neuro resident about concern for pseudotumor cerebri. Upon my reexamination of patient 5 minutes later, however, EOM were intact. · On admission imaging with CT head showed no acute intracranial normalities; hypoattenuation left lentiform nucleus and corresponding left frontal horn and dilatation concerning for chronic infarction but new since prior 2020 study  · Patient was given Decadron, Benadryl, magnesium sulfate in ED for which she initially reported some relief. Holding decadron in hyperglycemia. · Plan:  · Continue migraine prophylaxis PTA topiramate and cocktail (benadryl, reglan, toradol); avoid opioids for HA  · Per neuro recommendations; CPAP qhs  · CPAP had good effect on AM headache today 8/27;  Discharge with outpatient referral to sleep medicine  · Plan for LP w/ opening pressure Monday 8/28 as pt continues to have HA to r/o IIH; xarelto 20 qAM held  · Blood glucose control as below  · VS per unit routine    Insulin-dependent diabetes mellitus with neuropathy  Assessment & Plan  Lab Results   Component Value Date    HGBA1C 9.9 (H) 08/25/2023       Recent Labs     08/26/23  1543 08/26/23 2022 08/27/23  0716 08/27/23  1110   POCGLU 213* 274* 189* 227* Blood Sugar Average: Last 72 hrs:  (P) 210.55     · Poorly controlled blood sugars per chart review  · Home regimen with metformin 1000 mg daily and Trulicity 1.5 mg q. weekly, insulin glargine 45 units nightly, and sliding scale correction  · S/p Decadron during ED encounter for headache    Pending Ab testing per Endo    Plan:  · Continue gabapentin for neuropathy  · Hypoglycemic protocol  · Endocrinology consulted; appreciate recs  · Hold home antihyperglycemics metformin and Trulicity  · Sliding scale insulin algorithm #2  · Accu-Cheks  · Blood glucose goal 140- 180 while inpatient  · Diabetic diet      Essential hypertension  Assessment & Plan  Blood Pressure: 148/82      · Patient blood pressure upon initial evaluation was elevated 160/110  · Home regimen with Minipress 1 mg p.o. nightly and lisinopril    Plan:  · Continue home regimen  · Optimize pain control  · Continue monitoring vital signs/BP  · Adjust regimen accordingly    Atypical chest pain  Assessment & Plan  · Patient reported experiencing chest tightness/chest pain  · Cardiac work-up in ED was negative for any signs of ischemia/no elevation in troponin, EKG NSR  · Sees cardiology as outpatient  · Reports having a history of palpitations  · Per cardiology patient will have 1 week of Zio patch and echocardiogram  · We will continue to monitor off telemetry as patient is currently wearing a monitor at this time  · Plan for outpatient cardiology follow up as planned    S/P AKA (above knee amputation) unilateral, left (HCC)  Assessment & Plan  · Denies any pain on L AKA  · Continue statin; xarelto held    Morbid obesity  Assessment & Plan  Body mass index is 57.65 kg/m². Body mass index is 57.65 kg/m².     • Recommend incorporating a more whole foods plant-predominant diet along with decreasing consumption of red meats and processed foods  • Per AHA guidelines, recommend moderate-vigorous intensity exercise for 30 minutes a day for 5 days a week or a total of 150 min/week        Bipolar disorder  Assessment & Plan  Continue Topamax and prazosin  Pt self-discontinued seroquel  Continue outpatient follow up with psychiatrist    COPD (chronic obstructive pulmonary disease) ([facility])  Assessment & Plan  · Continue albuterol inhaler and fluticasone-vilanterol 200-25 mcg/actuation 1 puff  · Goal SpO2 > 88% while asleep; > 90% while awake  · Continue to monitor vital signs  · Follow up with pulmonology as outpatient               VTE Pharmacologic Prophylaxis:  AC held in preparation for LP    Patient Centered Rounds: I performed bedside rounds with nursing staff today. Discussions with Specialists or Other Care Team Provider: Dr Andrés Weir    Education and Discussions with Family / Patient: Updated  (wife) via phone. Current Length of Stay: 2 day(s)  Current Patient Status: Inpatient   Discharge Plan: Anticipate discharge in 24-48 hrs to home with outpt rehab vs home health PT pending transportation availability    Code Status: Level 1 - Full Code    Subjective:   Pt wass een this AM before rounds. She reports continued HA though significantly improved after use of CPAP overnight. Denies other concerns at this time. All questions answered    Objective:     Vitals:   Temp (24hrs), Av.3 °F (36.8 °C), Min:97.8 °F (36.6 °C), Max:99 °F (37.2 °C)    Temp:  [97.8 °F (36.6 °C)-99 °F (37.2 °C)] 97.8 °F (36.6 °C)  HR:  [] 86  Resp:  [16-18] 16  BP: (127-148)/(82-90) 148/82  SpO2:  [95 %-100 %] 98 %  Body mass index is 57.65 kg/m². Input and Output Summary (last 24 hours): Intake/Output Summary (Last 24 hours) at 2023 1143  Last data filed at 2023 0900  Gross per 24 hour   Intake 896 ml   Output --   Net 896 ml       Physical Exam:   Physical Exam  Vitals and nursing note reviewed. Constitutional:       General: She is not in acute distress. Appearance: She is well-developed. She is not ill-appearing.    HENT:      Head: Normocephalic and atraumatic. Nose: No congestion or rhinorrhea. Mouth/Throat:      Mouth: Mucous membranes are moist.      Pharynx: Oropharynx is clear. Eyes:      General: No scleral icterus. Conjunctiva/sclera: Conjunctivae normal.   Cardiovascular:      Rate and Rhythm: Normal rate and regular rhythm. Pulses: Normal pulses. Heart sounds: Normal heart sounds. No murmur heard. Pulmonary:      Effort: Pulmonary effort is normal. No respiratory distress. Breath sounds: Normal breath sounds. Abdominal:      Palpations: Abdomen is soft. Tenderness: There is no abdominal tenderness. Musculoskeletal:         General: No swelling. Cervical back: Neck supple. Right lower leg: No edema. Comments: L AKA   Skin:     General: Skin is warm and dry. Capillary Refill: Capillary refill takes less than 2 seconds. Neurological:      General: No focal deficit present. Mental Status: She is alert and oriented to person, place, and time. Psychiatric:         Mood and Affect: Mood normal.          Additional Data:     Labs:  Results from last 7 days   Lab Units 08/27/23  0610   WBC Thousand/uL 10.11   HEMOGLOBIN g/dL 10.1*   HEMATOCRIT % 30.8*   PLATELETS Thousands/uL 380   NEUTROS PCT % 57   LYMPHS PCT % 34   MONOS PCT % 5   EOS PCT % 2     Results from last 7 days   Lab Units 08/27/23  0610 08/26/23  0846 08/25/23  0611   SODIUM mmol/L 137   < > 134*   POTASSIUM mmol/L 4.1   < > 3.6   CHLORIDE mmol/L 106   < > 104   CO2 mmol/L 27   < > 25   BUN mg/dL 26*   < > 13   CREATININE mg/dL 0.45*   < > 0.46*   ANION GAP mmol/L 4   < > 5   CALCIUM mg/dL 7.9*   < > 8.4   ALBUMIN g/dL  --   --  3.3*   TOTAL BILIRUBIN mg/dL  --   --  0.29   ALK PHOS U/L  --   --  107*   ALT U/L  --   --  22   AST U/L  --   --  13   GLUCOSE RANDOM mg/dL 181*   < > 191*    < > = values in this interval not displayed.      Results from last 7 days   Lab Units 08/25/23  0611   INR  1.06     Results from last 7 days   Lab Units 08/27/23  1110 08/27/23  0716 08/26/23  2022 08/26/23  1543 08/26/23  1108 08/26/23  0747 08/26/23  0239 08/26/23  0058 08/25/23  2152 08/25/23 2008 08/25/23  1731 08/25/23  1524   POC GLUCOSE mg/dl 227* 189* 274* 213* 189* 119 129 93 158* 252* 246* 286*     Results from last 7 days   Lab Units 08/25/23  0028   HEMOGLOBIN A1C % 9.9*           Lines/Drains:  Invasive Devices     Peripheral Intravenous Line  Duration           Peripheral IV 08/26/23 Right;Upper Arm 1 day                      Imaging: No pertinent imaging reviewed.     Recent Cultures (last 7 days):         Last 24 Hours Medication List:   Current Facility-Administered Medications   Medication Dose Route Frequency Provider Last Rate   • acetaminophen  975 mg Oral Q6H PRN Parul Echeverria, DO     • albuterol  2 puff Inhalation Q4H PRN Jessie Harrison, DO     • aspirin  81 mg Oral Daily [facility], DO     • atorvastatin  40 mg Oral Daily With [employer], DO     • co-enzyme Q-10  30 mg Oral Daily Jessie Harrison, DO     • cyclobenzaprine  10 mg Oral TID PRN Parul Echeverria, DO     • DULoxetine  40 mg Oral Daily [facility], DO     • fluticasone-vilanterol  1 puff Inhalation Daily Jessie Harrison, DO     • gabapentin  300 mg Oral TID Jessie Harrison, DO     • HYDROmorphone  4 mg Oral Q4H PRN Jessie Harrison, DO     • hydrOXYzine HCL  50 mg Oral Q8H PRN Jessie Harrison, DO     • insulin glargine  30 Units Subcutaneous Q12H [address] Jessie Harrison, DO     • insulin lispro  2-12 Units Subcutaneous 4x Daily (AC & HS) Jessie Harrison, DO     • insulin lispro  20 Units Subcutaneous TID With Meals Parul Echeverria, DO     • lactulose  20 g Oral Daily Jessie Harrison, DO     • melatonin  3 mg Oral HS Geovani Avery MD     • nicotine  14 mg Transdermal Daily Genevive Glimpse [place], DO     • oxyCODONE  5 mg Oral Q4H PRN Dez Severin Sterling, DO     • oxyCODONE  2.5 mg Oral Q4H PRN Dez Severin [place], DO     • prazosin  1 mg Oral HS Dez Severin [facility], DO     • senna  1 tablet Oral HS Dez Severin [place], DO     • sodium chloride  100 mL/hr Intravenous Once [employer], DO     • topiramate  25 mg Oral BID [employer], DO          Today, Patient Was Seen By: Markie Bronson MD    **Please Note: This note may have been constructed using a voice recognition system. **

## 2023-08-27 NOTE — ASSESSMENT & PLAN NOTE
Body mass index is 57.65 kg/m². Body mass index is 57.65 kg/m².     • Recommend incorporating a more whole foods plant-predominant diet along with decreasing consumption of red meats and processed foods  • Per AHA guidelines, recommend moderate-vigorous intensity exercise for 30 minutes a day for 5 days a week or a total of 150 min/week

## 2023-08-27 NOTE — QUICK NOTE
Patient was given 1 dose of sumatriptan and another round of the migraine cocktail  This is helped for around 15 minutes however patient continues to have headache around 9 out of 10. When I went up to see the patient patient was on her phone and not in visible discomfort. She did request another round of medication to help her sleep. Also inquired about possibly getting Dilaudid. I did order 1 more dose of melatonin and oral diphenhydramine. Advised her to wait for the morning to get more medication to avoid side effects like kidney injury due to the Toradol. Patient is agreeable for this plan.

## 2023-08-27 NOTE — ASSESSMENT & PLAN NOTE
Lab Results   Component Value Date    HGBA1C 9.9 (H) 08/25/2023       Recent Labs     08/26/23  1543 08/26/23 2022 08/27/23  0716 08/27/23  1110   POCGLU 213* 274* 189* 227*       Blood Sugar Average: Last 72 hrs:  (P) 210.55     · Poorly controlled blood sugars per chart review  · Home regimen with metformin 1000 mg daily and Trulicity 1.5 mg q. weekly, insulin glargine 45 units nightly, and sliding scale correction  · S/p Decadron during ED encounter for headache    Pending Ab testing per Endo    Plan:  · Continue gabapentin for neuropathy  · Hypoglycemic protocol  · Endocrinology consulted; appreciate recs  · Hold home antihyperglycemics metformin and Trulicity  · Sliding scale insulin algorithm #2  · Accu-Cheks  · Blood glucose goal 140- 180 while inpatient  · Diabetic diet

## 2023-08-27 NOTE — DISCHARGE SUMMARY
[facility]  Discharge- Margaux Johnson 1985, 40 y.o. female MRN: [id number]  Unit/Bed#: W -01 Encounter: [id number]  Primary Care Provider: Conrad Warren MD   Date and time admitted to hospital: 8/24/2023  1:34 PM    * Headache-resolved as of 8/28/2023  Assessment & Plan  POA: 39 yo morbidly obese F w/PMHx migraines, HTN, T2DM, HLD, presenting with severe persistent frontotemporal HA with 9/10 or 10/10 pain. Associated w/?blurry vision and nausea. States this AM (8/28) that headache is resolved. · On admission imaging with CT head showed no acute intracranial normalities; hypoattenuation left lentiform nucleus and corresponding left frontal horn and dilatation concerning for chronic infarction but new since prior 2020 study  · Patient was given Decadron, Benadryl, magnesium sulfate in ED for which she initially reported some relief. Holding decadron in hyperglycemia.     Plan:  - Recommend f/u with sleep medicine for sleep study/CPAP script  - Blood glucose control as below    Insulin-dependent diabetes mellitus with neuropathy  Assessment & Plan  Lab Results   Component Value Date    HGBA1C 9.9 (H) 08/25/2023       Recent Labs     08/27/23  2047 08/28/23  0739 08/28/23  1056 08/28/23  1730   POCGLU 214* 127 120 259*       Blood Sugar Average: Last 72 hrs:  (P) 387.9772412445135611     · Poorly controlled blood sugars per chart review  · Home regimen with metformin 1000 mg daily and Trulicity 1.5 mg q. weekly, insulin glargine 45 units nightly, and sliding scale correction  · S/p Decadron during ED encounter for headache    Pending Ab testing per Endo    Plan:  - Continue gabapentin for neuropathy  - Lantus 30U q12h  - Humalog 16U with each meal, or 8U if not eating a full meal  - Log sugars at minimum prior to breakfast and dinner daily  - F/u outpatient with endocrinology  - Resume PTA metformin and Trulicity    Essential hypertension  Assessment & Plan  Blood Pressure: 120/75      Patient blood pressure upon initial evaluation was elevated 160/110  Continue home regimen with Minipress 1 mg p.o. nightly and lisinopril    Atypical chest pain-resolved as of 8/28/2023  Assessment & Plan  Patient reported experiencing chest tightness/chest pain. Cardiac work-up in ED was negative for any signs of ischemia/no elevation in troponin, EKG NSR  Follows w/ cardiology as outpatient. Patient reports having a history of palpitations. Per cardiology patient will have 1 week of Zio patch and echocardiogram; plan for outpatient follow up. S/P AKA (above knee amputation) unilateral, left ([address])  Assessment & Plan  Denies any pain on L AKA  Continue statin, restart xarelto at discharge (previously held for potential LP)    Morbid obesity  Assessment & Plan  Body mass index is 58.28 kg/m².     Recommend incorporation of [employer] predominant diet along with decreasing consumption of red meats and processed foods  Per AHA guidelines, recommend moderate-vigorous intensity exercise for 30 minutes a day for 5 days a week or a total of 150 min/week    Bipolar disorder  Assessment & Plan  Continue Topamax and prazosin  Pt self-discontinued seroquel  Continue outpatient follow up with psychiatrist    COPD (chronic obstructive pulmonary disease) ([address])  Assessment & Plan  Continue albuterol inhaler and fluticasone-vilanterol 200-25 mcg/actuation 1 puff  Follow up with pulmonology as outpatient      Medical Problems     Resolved Problems  Date Reviewed: 8/27/2023          Resolved    * (Principal) Headache 8/28/2023     Resolved by  Jasmina Payton DO    Atypical chest pain 8/28/2023     Resolved by  Jasmina Payton DO          Discharging Resident: Jasmina Payton DO  Discharging Attending: Drea Warner MD  Primary Care Physician at Discharge: Naeem Li [phone]     Admission Date: 8/24/2023  Discharge Date: 8/28/2023  6:56 PM    Primary Discharge [address] Stay:  IP CONSULT TO NEUROLOGY  • IP CONSULT TO ENDOCRINOLOGY     Procedures Performed:   • None    Operative Procedures Performed:  • None    Significant Findings / Test Results:   Labs:  Results from last 7 days   Lab Units 08/28/23  0642 08/27/23  0610 08/26/23  0846 08/25/23  0611 08/24/23  1632   WBC Thousand/uL 11.66* 10.11 10.52* 15.03* 10.88*   HEMOGLOBIN g/dL 10.8* 10.1* 7.7* 10.4* 11.9   HEMATOCRIT % 32.2* 30.8* 24.4* 31.3* 35.8   PLATELETS Thousands/uL 397* 380 273 435* 399*   NEUTROS PCT % 66 57 62 88* 63   LYMPHS PCT % 25 34 31 8* 28   MONOS PCT % 5 5 5 3* 5   EOS PCT % 3 2 1 0 2     Results from last 7 days   Lab Units 08/28/23  0642 08/27/23  0610 08/26/23  0846 08/25/23  0611 08/24/23  2257 08/24/23  1632   SODIUM mmol/L 136 137 136 134* 127* 135   POTASSIUM mmol/L 4.1 4.1 3.6 3.6 4.8 4.1   CHLORIDE mmol/L 106 106 107 104 99 102   CO2 mmol/L 24 27 21 25 18* 24   BUN mg/dL 21 26* 18 13 14 12   CREATININE mg/dL 0.43* 0.45* 0.50* 0.46* 0.65 0.43*   ANION GAP mmol/L 6 4 8 5 10 9   CALCIUM mg/dL 8.2* 7.9* 7.7* 8.4 8.1* 8.9   ALBUMIN g/dL  --   --   --  3.3* 3.5 3.8   TOTAL BILIRUBIN mg/dL  --   --   --  0.29 0.44 0.39   ALK PHOS U/L  --   --   --  107* 124* 120*   ALT U/L  --   --   --  22 21 20   AST U/L  --   --   --  13 22 17   GLUCOSE RANDOM mg/dL 131 181* 153* 191* 615* 218*     Results from last 7 days   Lab Units 08/25/23  0611 08/24/23  1632   INR  1.06 1.66*   PTT seconds 27 24      Results from last 7 days   Lab Units 08/28/23  1730 08/28/23  1056 08/28/23  0739 08/27/23  2047 08/27/23  1608 08/27/23  1110 08/27/23  0716 08/26/23  2022 08/26/23  1543 08/26/23  1108 08/26/23  0747 08/26/23  0239   POC GLUCOSE mg/dl 259* 120 127 214* 255* 227* 189* 274* 213* 189* 119 129     Results from last 7 days   Lab Units 08/25/23  0028   HEMOGLOBIN A1C % 9.9*         Results from last 7 days   Lab Units 08/25/23  0611   MAGNESIUM mg/dL 1.9 ]    Imaging:  CT head without contrast    Result Date: 8/24/2023  Impression: No acute intracranial abnormality. Hypoattenuation left lentiform nucleus and corresponding left frontal horn ex vacuo dilatation concerning for chronic infarction but new since prior 2020 study. Consider follow-up nonemergent MRI of the brain especially  given the patient's age. The study was marked in EPIC for significant notification. Workstation performed: [id number]     XR chest 1 view portable    Result Date: 8/24/2023  Impression: No acute cardiopulmonary disease. Workstation performed: [id number]        Incidental Findings:   · None     Test Results Pending at Discharge (will require follow up):    Pending Studies (From admission, onward)     Start     Ordered    08/26/23 0600  Glutamic acid decarboxylase  Morning draw         08/25/23 1547               Pending Labs     Order Current Status    Glutamic acid decarboxylase In process          Outpatient Tests Requested:  None    Complications:  None    Reason for Admission: Headache    Hospital Course:   Lavaughn Councilman is a 40 y.o. female patient who originally presented to the hospital on 8/24/2023 due to   Chief Complaint   Patient presents with   • Chest Pain     Intermittant chest pain and headache   . Per H&P "Lavaughn Councilman is a 40 y.o. female with a past medical history significant for T2DM COPD, morbid obesity, PAD status post LLE bypass and stenting 99/5731 complicated by stent thrombosis, left side AKA, who presents with a complaint headache. Patient reports that she initially started feeling headache late yesterday evening and reports that it progressively worsened into this morning. She reports that she also had elevated BP at her house this morning (168/110) for which her home health care nurse mentioned that the patient should go to the ED for further evaluation.   She describes her headache as squeezing starting at the frontal aspect of her forehead, that radiates throughout the entire head. She denies any relief from position changes, denies taking any OTC pain medication at home. In ED patient's work-up with CT head showed possible chronic infarction in the left lentiform nucleus suggested by hypoattenuation, also associated ex vacuo dilatation in the anterior horn of the left lateral ventricle. Lab work revealed leukocytosis at 10.88, decreased MCV at 65, thrombocytosis at 399, and her UA showed elevated specific gravity, leukocytes, glucose greater than 1000, ketones at 80. Patient was given Decadron, Benadryl, Dilaudid. Reports that her headache reduced slowly but was still experiencing significant amount of pressure around her forehead. She denies any other symptoms at this time. "    Reina Stewart was admitted and received a migraine cocktail which included decadron. Her blood sugars elevated significantly and she was started on an inulin gtt. Endocrinology was consulted and advised antibody testing (recommendations provided by endocrinology team noted above in assessment/plan section). HA continued and neurology recommended additional interventions until finding relief from oral migraine cocktail PRN as well as CPAP overnight. LP was planned to rule out IIH, however when she was assessed the morning she was to have LP performed, headache had reportedly resolved. She is stable for discharge with multidisciplinary follow-up at this time. Please see above list of diagnoses and related plan for additional information. Condition at Discharge: stable    Discharge Day Visit / Exam:   Subjective: Patient seen and evaluated at bedside. She reports headache has improved significantly on first encounter; later during the day headache was reported to have ceased entirely. She denies other current acute concerns.     Vitals: Blood Pressure: 120/75 (08/28/23 0741)  Pulse: 80 (08/28/23 0741)  Temperature: 98.3 °F (36.8 °C) (08/27/23 2045)  Temp Source: Oral (08/25/23 1142)  Respirations: 16 (08/28/23 0741)  Height: 5' 1" (154.9 cm) (08/28/23 0741)  Weight - Scale: (!) 140 kg (308 lb 6.8 oz) (08/28/23 0600)  SpO2: 97 % (08/28/23 0741)  Exam:   Physical Exam  Vitals and nursing note reviewed. Constitutional:       General: She is not in acute distress. Appearance: She is well-developed. She is not ill-appearing. Comments: Body mass index is 58.28 kg/m²   HENT:      Head: Normocephalic and atraumatic. Nose: No congestion or rhinorrhea. Mouth/Throat:      Mouth: Mucous membranes are moist.      Pharynx: Oropharynx is clear. Eyes:      General: No scleral icterus. Extraocular Movements: Extraocular movements intact. Conjunctiva/sclera: Conjunctivae normal.   Cardiovascular:      Rate and Rhythm: Normal rate and regular rhythm. Pulses: Normal pulses. Heart sounds: Normal heart sounds. No murmur heard. Pulmonary:      Effort: Pulmonary effort is normal. No respiratory distress. Breath sounds: Normal breath sounds. Abdominal:      Palpations: Abdomen is soft. Tenderness: There is no abdominal tenderness. Musculoskeletal:         General: No swelling. Cervical back: Neck supple. Right lower leg: No edema. Comments: left AKA   Skin:     General: Skin is warm and dry. Capillary Refill: Capillary refill takes less than 2 seconds. Neurological:      General: No focal deficit present. Mental Status: She is alert and oriented to person, place, and time. Psychiatric:         Mood and Affect: Mood normal.         Behavior: Behavior normal.         Discharge instructions/Information to patient and family:   See after visit summary for information provided to patient and family. Provisions for Follow-Up Care:  See after visit summary for information related to follow-up care and any pertinent home health orders.        Disposition:  Home    Planned Readmission: None    Discharge Medications:  See after visit summary for reconciled discharge medications provided to patient and/or family.       **Please Note: This note may have been constructed using a voice recognition system**

## 2023-08-27 NOTE — PLAN OF CARE
Problem: MOBILITY - ADULT  Goal: Maintain or return to baseline ADL function  Description: INTERVENTIONS:  -  Assess patient's ability to carry out ADLs; assess patient's baseline for ADL function and identify physical deficits which impact ability to perform ADLs (bathing, care of mouth/teeth, toileting, grooming, dressing, etc.)  - Assess/evaluate cause of self-care deficits   - Assess range of motion  - Assess patient's mobility; develop plan if impaired  - Assess patient's need for assistive devices and provide as appropriate  - Encourage maximum independence but intervene and supervise when necessary  - Involve family in performance of ADLs  - Assess for home care needs following discharge   - Consider OT consult to assist with ADL evaluation and planning for discharge  - Provide patient education as appropriate  Outcome: Progressing  Goal: Maintains/Returns to pre admission functional level  Description: INTERVENTIONS:  - Perform BMAT or MOVE assessment daily.   - Set and communicate daily mobility goal to care team and patient/family/caregiver.    - Collaborate with rehabilitation services on mobility goals if consulted  - Stand patient 2 times a day  -- Out of bed to chair 1 times a day   - Out of bed for meals 1 times a day  - Out of bed for toileting  - Record patient progress and toleration of activity level   Outcome: Progressing

## 2023-08-27 NOTE — ASSESSMENT & PLAN NOTE
Blood Pressure: 148/82      · Patient blood pressure upon initial evaluation was elevated 160/110  · Home regimen with Minipress 1 mg p.o. nightly and lisinopril    Plan:  · Continue home regimen  · Optimize pain control  · Continue monitoring vital signs/BP  · Adjust regimen accordingly

## 2023-08-28 ENCOUNTER — TELEPHONE (OUTPATIENT)
Dept: FAMILY MEDICINE CLINIC | Facility: CLINIC | Age: 38
End: 2023-08-28

## 2023-08-28 VITALS
BODY MASS INDEX: 55.32 KG/M2 | DIASTOLIC BLOOD PRESSURE: 75 MMHG | OXYGEN SATURATION: 97 % | SYSTOLIC BLOOD PRESSURE: 120 MMHG | WEIGHT: 293 LBS | HEIGHT: 61 IN | RESPIRATION RATE: 16 BRPM | TEMPERATURE: 98.3 F | HEART RATE: 80 BPM

## 2023-08-28 PROBLEM — R51.9 HEADACHE: Status: RESOLVED | Noted: 2020-08-07 | Resolved: 2023-08-28

## 2023-08-28 PROBLEM — R07.89 ATYPICAL CHEST PAIN: Status: RESOLVED | Noted: 2023-08-24 | Resolved: 2023-08-28

## 2023-08-28 LAB
ANION GAP SERPL CALCULATED.3IONS-SCNC: 6 MMOL/L
BASOPHILS # BLD AUTO: 0.04 THOUSANDS/ÂΜL (ref 0–0.1)
BASOPHILS NFR BLD AUTO: 0 % (ref 0–1)
BUN SERPL-MCNC: 21 MG/DL (ref 5–25)
CALCIUM SERPL-MCNC: 8.2 MG/DL (ref 8.4–10.2)
CHLORIDE SERPL-SCNC: 106 MMOL/L (ref 96–108)
CO2 SERPL-SCNC: 24 MMOL/L (ref 21–32)
CREAT SERPL-MCNC: 0.43 MG/DL (ref 0.6–1.3)
EOSINOPHIL # BLD AUTO: 0.37 THOUSAND/ÂΜL (ref 0–0.61)
EOSINOPHIL NFR BLD AUTO: 3 % (ref 0–6)
ERYTHROCYTE [DISTWIDTH] IN BLOOD BY AUTOMATED COUNT: 19.9 % (ref 11.6–15.1)
GFR SERPL CREATININE-BSD FRML MDRD: 131 ML/MIN/1.73SQ M
GLUCOSE SERPL-MCNC: 120 MG/DL (ref 65–140)
GLUCOSE SERPL-MCNC: 127 MG/DL (ref 65–140)
GLUCOSE SERPL-MCNC: 131 MG/DL (ref 65–140)
GLUCOSE SERPL-MCNC: 259 MG/DL (ref 65–140)
HCT VFR BLD AUTO: 32.2 % (ref 34.8–46.1)
HGB BLD-MCNC: 10.8 G/DL (ref 11.5–15.4)
IMM GRANULOCYTES # BLD AUTO: 0.1 THOUSAND/UL (ref 0–0.2)
IMM GRANULOCYTES NFR BLD AUTO: 1 % (ref 0–2)
LYMPHOCYTES # BLD AUTO: 2.91 THOUSANDS/ÂΜL (ref 0.6–4.47)
LYMPHOCYTES NFR BLD AUTO: 25 % (ref 14–44)
MCH RBC QN AUTO: 21.7 PG (ref 26.8–34.3)
MCHC RBC AUTO-ENTMCNC: 33.5 G/DL (ref 31.4–37.4)
MCV RBC AUTO: 65 FL (ref 82–98)
MONOCYTES # BLD AUTO: 0.53 THOUSAND/ÂΜL (ref 0.17–1.22)
MONOCYTES NFR BLD AUTO: 5 % (ref 4–12)
NEUTROPHILS # BLD AUTO: 7.71 THOUSANDS/ÂΜL (ref 1.85–7.62)
NEUTS SEG NFR BLD AUTO: 66 % (ref 43–75)
NRBC BLD AUTO-RTO: 0 /100 WBCS
PLATELET # BLD AUTO: 397 THOUSANDS/UL (ref 149–390)
PMV BLD AUTO: 9.6 FL (ref 8.9–12.7)
POTASSIUM SERPL-SCNC: 4.1 MMOL/L (ref 3.5–5.3)
RBC # BLD AUTO: 4.97 MILLION/UL (ref 3.81–5.12)
SODIUM SERPL-SCNC: 136 MMOL/L (ref 135–147)
WBC # BLD AUTO: 11.66 THOUSAND/UL (ref 4.31–10.16)

## 2023-08-28 PROCEDURE — 85025 COMPLETE CBC W/AUTO DIFF WBC: CPT | Performed by: FAMILY MEDICINE

## 2023-08-28 PROCEDURE — 94660 CPAP INITIATION&MGMT: CPT

## 2023-08-28 PROCEDURE — 82948 REAGENT STRIP/BLOOD GLUCOSE: CPT

## 2023-08-28 PROCEDURE — 93005 ELECTROCARDIOGRAM TRACING: CPT

## 2023-08-28 PROCEDURE — 94760 N-INVAS EAR/PLS OXIMETRY 1: CPT

## 2023-08-28 PROCEDURE — 99239 HOSP IP/OBS DSCHRG MGMT >30: CPT | Performed by: INTERNAL MEDICINE

## 2023-08-28 PROCEDURE — 80048 BASIC METABOLIC PNL TOTAL CA: CPT | Performed by: FAMILY MEDICINE

## 2023-08-28 RX ORDER — KETOTIFEN FUMARATE 0.35 MG/ML
1 SOLUTION/ DROPS OPHTHALMIC 2 TIMES DAILY PRN
Qty: 5 ML | Refills: 0 | Status: SHIPPED | OUTPATIENT
Start: 2023-08-28

## 2023-08-28 RX ADMIN — INSULIN LISPRO 6 UNITS: 100 INJECTION, SOLUTION INTRAVENOUS; SUBCUTANEOUS at 18:34

## 2023-08-28 RX ADMIN — ASPIRIN 81 MG 81 MG: 81 TABLET ORAL at 08:26

## 2023-08-28 RX ADMIN — INSULIN LISPRO 20 UNITS: 100 INJECTION, SOLUTION INTRAVENOUS; SUBCUTANEOUS at 18:35

## 2023-08-28 RX ADMIN — DIPHENHYDRAMINE HYDROCHLORIDE 25 MG: 50 INJECTION, SOLUTION INTRAMUSCULAR; INTRAVENOUS at 13:45

## 2023-08-28 RX ADMIN — INSULIN LISPRO 20 UNITS: 100 INJECTION, SOLUTION INTRAVENOUS; SUBCUTANEOUS at 11:54

## 2023-08-28 RX ADMIN — TOPIRAMATE 25 MG: 25 TABLET ORAL at 08:26

## 2023-08-28 RX ADMIN — MAGNESIUM SULFATE HEPTAHYDRATE 2 G: 40 INJECTION, SOLUTION INTRAVENOUS at 08:25

## 2023-08-28 RX ADMIN — METOCLOPRAMIDE HYDROCHLORIDE 10 MG: 5 INJECTION INTRAMUSCULAR; INTRAVENOUS at 06:40

## 2023-08-28 RX ADMIN — KETOROLAC TROMETHAMINE 30 MG: 30 INJECTION, SOLUTION INTRAMUSCULAR; INTRAVENOUS at 13:45

## 2023-08-28 RX ADMIN — NICOTINE 14 MG: 14 PATCH, EXTENDED RELEASE TRANSDERMAL at 08:26

## 2023-08-28 RX ADMIN — INSULIN LISPRO 20 UNITS: 100 INJECTION, SOLUTION INTRAVENOUS; SUBCUTANEOUS at 08:27

## 2023-08-28 RX ADMIN — ATORVASTATIN CALCIUM 40 MG: 40 TABLET, FILM COATED ORAL at 17:29

## 2023-08-28 RX ADMIN — INSULIN GLARGINE 30 UNITS: 100 INJECTION, SOLUTION SUBCUTANEOUS at 08:26

## 2023-08-28 RX ADMIN — GABAPENTIN 300 MG: 300 CAPSULE ORAL at 17:29

## 2023-08-28 RX ADMIN — DULOXETINE HYDROCHLORIDE 40 MG: 20 CAPSULE, DELAYED RELEASE ORAL at 10:00

## 2023-08-28 RX ADMIN — KETOROLAC TROMETHAMINE 30 MG: 30 INJECTION, SOLUTION INTRAMUSCULAR; INTRAVENOUS at 06:38

## 2023-08-28 RX ADMIN — GABAPENTIN 300 MG: 300 CAPSULE ORAL at 08:26

## 2023-08-28 RX ADMIN — METOCLOPRAMIDE HYDROCHLORIDE 10 MG: 5 INJECTION INTRAMUSCULAR; INTRAVENOUS at 13:45

## 2023-08-28 RX ADMIN — TOPIRAMATE 25 MG: 25 TABLET ORAL at 17:29

## 2023-08-28 RX ADMIN — DIPHENHYDRAMINE HYDROCHLORIDE 25 MG: 50 INJECTION, SOLUTION INTRAMUSCULAR; INTRAVENOUS at 06:36

## 2023-08-28 RX ADMIN — GLYCERIN 2 DROP: .002; .002; .01 SOLUTION/ DROPS OPHTHALMIC at 06:43

## 2023-08-28 RX ADMIN — KETOTIFEN FUMARATE 1 DROP: 0.25 SOLUTION/ DROPS OPHTHALMIC at 06:43

## 2023-08-28 NOTE — TELEPHONE ENCOUNTER
Cathie called and needs a new referral due to insurance changes for skilled and psych nursing services. Please fax to [phone]: Nicolas Jacobo     Thank you.

## 2023-08-28 NOTE — PROGRESS NOTES
Progress Note - [facility]    Patient Information: Vish Torres 40 y.o. female MRN: [id number]  Unit/Bed#: W -01 Encounter: [id number]  Admitting Physician: Elver Loaiza MD  PCP: Naresh Mcnally MD  Date of Consultation:  08/28/23    ASSESSMENT:  Shanda Garcia is a 40year old female with poorly controlled T2DM. Has been on     PLAN:    1. Type 2 diabetes mellitus with hyperglycemia  Patient may resume Metformin 7355FF BID and Trulicty 3.1IM weekly at discharge. Continue monitoring sugars at home before breakfast and before dinner, but preferably prior to every meal.   Avoid hypoglycemia, patient aware of signs, symptoms and management  Patient has undergone diabetes education. Outpatient endocrinology follow up. Reason for consultation:   Type 2 diabetes mellitus    Subjective:  Patient found sitting in chair, not in acute distress at the time of my evaluation. She reports resolution of her headache, however endorses burning in her eyes for which she is getting artificial tears with improvement. She had no other complaints at this time. Past Medical and Surgical History:     Past Medical History:   Diagnosis Date   • Asthma    • Atherosclerosis    • Bipolar 1 disorder ([address])    • COPD (chronic obstructive pulmonary disease) ([address])    • Depression    • Diabetes mellitus ([address])    • Hypertension    • Psychiatric disorder     cutting history   • PTSD (post-traumatic stress disorder)    • Schizoaffective disorder ([address])    • Tendonitis        Past Surgical History:   Procedure Laterality Date   • AMPUTATION ABOVE KNEE (AKA) Left 3/29/2023    Procedure: AMPUTATION ABOVE KNEE (AKA); Surgeon: Denita Naidu MD;  Location: [facility];  Service: Vascular   • BYPASS FEMORAL-POPLITEAL Left 09/14/2021    Procedure: Left Common Femoral Below Knee to Popliteal Bypass with Insitu GSV graft.   Left Lower Extremity Angiogram;  Surgeon: Denita Naidu MD;  Location: [facility]; Service: Vascular   •  SECTION     • EAR SURGERY     • IR LOWER EXTREMITY ANGIOGRAM  2021   • IR LOWER EXTREMITY ANGIOGRAM  2021   • MT SLCTV CATHJ 3RD+ ORD SLCTV ABDL PEL/LXTR [facility] Left 3/24/2023    Procedure: Left leg angiogram;  Surgeon: Esteban Weller DO;  Location: [facility];  Service: Vascular   • TUBAL LIGATION         Meds/Allergies:    all medications and allergies reviewed    Allergies: No Known Allergies  History:     Marital Status: /Civil Union   Occupation:   Substance Use History:   Social History     Substance and Sexual Activity   Alcohol Use Not Currently    Comment: not currently     Social History     Tobacco Use   Smoking Status Former   • Packs/day: 1.00   • Years: 20.00   • Total pack years: 20.00   • Types: Cigarettes   • Quit date: 3/24/2023   • Years since quittin.4   Smokeless Tobacco Former   • Quit date: 2022     Social History     Substance and Sexual Activity   Drug Use Not Currently   • Types: Cocaine, Marijuana, "Crack" cocaine    Comment: 1 years clean from crack cocaine since        Physical Exam:     Vitals:   Blood Pressure: 120/75 (23 0741)  Pulse: 80 (23 0741)  Temperature: 98.3 °F (36.8 °C) (23)  Temp Source: Oral (23 1142)  Respirations: 16 (23 0741)  Height: 5' 1" (154.9 cm) (23 0741)  Weight - Scale: (!) 140 kg (308 lb 6.8 oz) (23 0600)  SpO2: 97 % (23 0741)    Physical Exam  Vitals and nursing note reviewed. Constitutional:       General: She is not in acute distress. Appearance: Normal appearance. She is not ill-appearing, toxic-appearing or diaphoretic. HENT:      Head: Normocephalic and atraumatic. Nose: Nose normal.      Mouth/Throat:      Mouth: Mucous membranes are moist.      Pharynx: Oropharynx is clear. Eyes:      General: No scleral icterus. Right eye: No discharge. Left eye: No discharge.       Extraocular Movements: Extraocular movements intact. Conjunctiva/sclera: Conjunctivae normal.   Cardiovascular:      Rate and Rhythm: Normal rate and regular rhythm. Pulses: Normal pulses. Heart sounds: Normal heart sounds. No murmur heard. Pulmonary:      Effort: Pulmonary effort is normal. No respiratory distress. Breath sounds: Normal breath sounds. No wheezing, rhonchi or rales. Abdominal:      General: Abdomen is flat. Bowel sounds are normal. There is no distension. Palpations: Abdomen is soft. Tenderness: There is no abdominal tenderness. There is no guarding or rebound. Musculoskeletal:      Cervical back: Normal range of motion and neck supple. Right lower leg: No edema. Left lower leg: No edema. Skin:     General: Skin is warm and dry. Coloration: Skin is not jaundiced or pale. Neurological:      Mental Status: She is alert and oriented to person, place, and time. Mental status is at baseline. Psychiatric:         Mood and Affect: Mood normal.         Behavior: Behavior normal.         Thought Content: Thought content normal.         Judgment: Judgment normal.              Lab and Imaging Results: I have personally reviewed pertinent reports. and I have personally reviewed pertinent films in PACS    Results from last 7 days   Lab Units 08/28/23  0642   WBC Thousand/uL 11.66*   HEMOGLOBIN g/dL 10.8*   HEMATOCRIT % 32.2*   PLATELETS Thousands/uL 397*   NEUTROS PCT % 66   LYMPHS PCT % 25   MONOS PCT % 5   EOS PCT % 3     Results from last 7 days   Lab Units 08/28/23  0642 08/26/23  0846 08/25/23  0611   POTASSIUM mmol/L 4.1   < > 3.6   CHLORIDE mmol/L 106   < > 104   CO2 mmol/L 24   < > 25   BUN mg/dL 21   < > 13   CREATININE mg/dL 0.43*   < > 0.46*   CALCIUM mg/dL 8.2*   < > 8.4   ALK PHOS U/L  --   --  107*   ALT U/L  --   --  22   AST U/L  --   --  13    < > = values in this interval not displayed.      Results from last 7 days   Lab Units 08/25/23  0611   INR  1.06     POC Glucose (mg/dl) Date Value   08/28/2023 120   08/28/2023 127   08/27/2023 214 (H)   08/27/2023 255 (H)   08/27/2023 227 (H)   08/27/2023 189 (H)   08/26/2023 274 (H)   08/26/2023 213 (H)   08/26/2023 189 (H)   08/26/2023 119         ** Please Note: Dragon 360 Dictation voice to text software may have been used in the creation of this document.  **

## 2023-08-28 NOTE — QUICK NOTE
The nurse called this writer to report that patient was complaining of intense burning on the face. Writer went to see patient. No rash was seen on patient's face or in patient's mouth. Patient reports that this burning started mid day however nurse first heard of complaints after giving magnesium. Nurse was asked to hold magnesium. Nurse was asked to apply ice packs and cream to patient's face.

## 2023-08-28 NOTE — PLAN OF CARE
Problem: MOBILITY - ADULT  Goal: Maintain or return to baseline ADL function  Description: INTERVENTIONS:  -  Assess patient's ability to carry out ADLs; assess patient's baseline for ADL function and identify physical deficits which impact ability to perform ADLs (bathing, care of mouth/teeth, toileting, grooming, dressing, etc.)  - Assess/evaluate cause of self-care deficits   - Assess range of motion  - Assess patient's mobility; develop plan if impaired  - Assess patient's need for assistive devices and provide as appropriate  - Encourage maximum independence but intervene and supervise when necessary  - Involve family in performance of ADLs  - Assess for home care needs following discharge   - Consider OT consult to assist with ADL evaluation and planning for discharge  - Provide patient education as appropriate  Outcome: Progressing  Goal: Maintains/Returns to pre admission functional level  Description: INTERVENTIONS:  - Perform BMAT or MOVE assessment daily.   - Set and communicate daily mobility goal to care team and patient/family/caregiver.    - Collaborate with rehabilitation services on mobility goals if consulted  - Out of bed for toileting  - Record patient progress and toleration of activity level   Outcome: Progressing     Problem: Prexisting or High Potential for Compromised Skin Integrity  Goal: Skin integrity is maintained or improved  Description: INTERVENTIONS:  - Identify patients at risk for skin breakdown  - Assess and monitor skin integrity  - Assess and monitor nutrition and hydration status  - Monitor labs   - Assess for incontinence   - Turn and reposition patient  - Assist with mobility/ambulation  - Relieve pressure over bony prominences  - Avoid friction and shearing  - Provide appropriate hygiene as needed including keeping skin clean and dry  - Evaluate need for skin moisturizer/barrier cream  - Collaborate with interdisciplinary team   - Patient/family teaching  - Consider wound care consult   Outcome: Progressing     Problem: Nutrition/Hydration-ADULT  Goal: Nutrient/Hydration intake appropriate for improving, restoring or maintaining nutritional needs  Description: Monitor and assess patient's nutrition/hydration status for malnutrition. Collaborate with interdisciplinary team and initiate plan and interventions as ordered. Monitor patient's weight and dietary intake as ordered or per policy. Utilize nutrition screening tool and intervene as necessary. Determine patient's food preferences and provide high-protein, high-caloric foods as appropriate.      INTERVENTIONS:  - Monitor oral intake, urinary output, labs, and treatment plans  - Assess nutrition and hydration status and recommend course of action  - Evaluate amount of meals eaten  - Assist patient with eating if necessary   - Allow adequate time for meals  - Recommend/ encourage appropriate diets, oral nutritional supplements, and vitamin/mineral supplements  - Order, calculate, and assess calorie counts as needed  - Recommend, monitor, and adjust tube feedings and TPN/PPN based on assessed needs  - Assess need for intravenous fluids  - Provide specific nutrition/hydration education as appropriate  - Include patient/family/caregiver in decisions related to nutrition  Outcome: Progressing

## 2023-08-28 NOTE — DISCHARGE INSTR - AVS FIRST PAGE
Dear Nona Mayo,     It was our pleasure to care for you here at [facility]. It is our hope that we were always able to exceed the expected standards for your care during your stay. You were hospitalized due to headache. You were cared for on the [address] [facility] floor by Vahid Arceo DO under the service of Bobby Hernandez MD with the [facility] who covers for your primary care physician (PCP), Dania Bergman MD, while you were hospitalized. If you have any questions or concerns related to this hospitalization, you may contact us at [phone]. For follow up as well as any medication refills, we recommend that you follow up with your primary care physician. A registered nurse will reach out to you by phone within a few days after your discharge to answer any additional questions that you may have after going home. However, at this time we provide for you here, the most important instructions / recommendations at discharge:     Notable Medication Adjustments -   None  Testing Required after Discharge -   None  Important follow up information -   None  Other Instructions -   Take your lantus 30 units every 12 hours. Take your humalog 16 with each meal, or if you are not eating a full meal take 8 units. Continue checking sugars before breakfast and before dinner at minimum; maintain a log of your blood sugars and bring them to your next endocrinology appointment. Please review this entire after visit summary as additional general instructions including medication list, appointments, activity, diet, any pertinent wound care, and other additional recommendations from your care team that may be provided for you.       Sincerely,     Vahid Arceo DO

## 2023-08-28 NOTE — QUICK NOTE
Patient seen and examined today, reports resolution in headache, however complains of burning in her eyes which improves with artificial tears. On exam, this is a morbidly obese female, NAD, NC/AT, PERRLA, EOMI, RRR, S1/S2 WNL, no murmurs, rubs or gallops, no abdominal tenderness or distension. Patient is s/p left AKA. A/P:    1. Type 2 diabetes mellitus  - ok to discharge patient on PTA Metformin 1861OA BID and Trulicity 5.0MU once weekly. - Continue Lantus 30 units every 12 hours at discharge. - Decrease HumaLog to 16 units three times daily with meals.   - Patient advised to continue accuchecks before breakfast and dinner, but preferably three times daily before meals and maintain a log which she will bring to her follow up endocrinology appointment. - Discussed with patient signs, symptoms and management of hypoglycemia, patient aware of what to do if she is ever hypoglycemic.   - Follow up on MIGUE 65 and Anto-islet cell antibodies  - Follow up with endocrinology \    2. Hypertension  - recommend restarting PTA Lisiniopril at discharge.

## 2023-08-28 NOTE — QUICK NOTE
Received TT from nursing staff that the patient continued to complain of intense burning in eyes. Upon my assessment, patient writhing and moaning while rubbing her eyes. Again, there was no rash detected on the patient's face, no conjunctival injection, normal EOM with mild pain and increased tearing. Mild TTP over nasal/frontal sinuses. Patient thought any other associated symptoms. She admits that this is the first time she is experienced the symptoms. Will trial artificial tears, antihistamine drops and applying a warm compress. Will attempt to contact Neuro for any additional consideration for mgmt given she was evaluated by them earlier for her migraine headaches.

## 2023-08-28 NOTE — PLAN OF CARE
Problem: MOBILITY - ADULT  Goal: Maintain or return to baseline ADL function  Description: INTERVENTIONS:  -  Assess patient's ability to carry out ADLs; assess patient's baseline for ADL function and identify physical deficits which impact ability to perform ADLs (bathing, care of mouth/teeth, toileting, grooming, dressing, etc.)  - Assess/evaluate cause of self-care deficits   - Assess range of motion  - Assess patient's mobility; develop plan if impaired  - Assess patient's need for assistive devices and provide as appropriate  - Encourage maximum independence but intervene and supervise when necessary  - Involve family in performance of ADLs  - Assess for home care needs following discharge   - Consider OT consult to assist with ADL evaluation and planning for discharge  - Provide patient education as appropriate  8/28/2023 1328 by Rusty Banda  Outcome: Adequate for Discharge  8/28/2023 1025 by Rusty Banda  Outcome: Progressing  Goal: Maintains/Returns to pre admission functional level  Description: INTERVENTIONS:  - Perform BMAT or MOVE assessment daily.   - Set and communicate daily mobility goal to care team and patient/family/caregiver. - Collaborate with rehabilitation services on mobility goals if consulted  - Perform Range of Motion 3 times a day. - Reposition patient every 2 hours.   - Dangle patient 3 times a day  - Stand patient 3 times a day  - Ambulate patient 3 times a day  - Out of bed to chair 3 times a day   - Out of bed for meals 3 times a day  - Out of bed for toileting  - Record patient progress and toleration of activity level   8/28/2023 1328 by Rusty Banda  Outcome: Adequate for Discharge  8/28/2023 1025 by Rusty Banda  Outcome: Progressing     Problem: Prexisting or High Potential for Compromised Skin Integrity  Goal: Skin integrity is maintained or improved  Description: INTERVENTIONS:  - Identify patients at risk for skin breakdown  - Assess and monitor skin integrity  - Assess and monitor nutrition and hydration status  - Monitor labs   - Assess for incontinence   - Turn and reposition patient  - Assist with mobility/ambulation  - Relieve pressure over bony prominences  - Avoid friction and shearing  - Provide appropriate hygiene as needed including keeping skin clean and dry  - Evaluate need for skin moisturizer/barrier cream  - Collaborate with interdisciplinary team   - Patient/family teaching  - Consider wound care consult   8/28/2023 1328 by Kerry Lara  Outcome: Adequate for Discharge  8/28/2023 1025 by Kerry Lara  Outcome: Progressing     Problem: Nutrition/Hydration-ADULT  Goal: Nutrient/Hydration intake appropriate for improving, restoring or maintaining nutritional needs  Description: Monitor and assess patient's nutrition/hydration status for malnutrition. Collaborate with interdisciplinary team and initiate plan and interventions as ordered. Monitor patient's weight and dietary intake as ordered or per policy. Utilize nutrition screening tool and intervene as necessary. Determine patient's food preferences and provide high-protein, high-caloric foods as appropriate.      INTERVENTIONS:  - Monitor oral intake, urinary output, labs, and treatment plans  - Assess nutrition and hydration status and recommend course of action  - Evaluate amount of meals eaten  - Assist patient with eating if necessary   - Allow adequate time for meals  - Recommend/ encourage appropriate diets, oral nutritional supplements, and vitamin/mineral supplements  - Order, calculate, and assess calorie counts as needed  - Recommend, monitor, and adjust tube feedings and TPN/PPN based on assessed needs  - Assess need for intravenous fluids  - Provide specific nutrition/hydration education as appropriate  - Include patient/family/caregiver in decisions related to nutrition  8/28/2023 1328 by Kerry Lara  Outcome: Adequate for Discharge  8/28/2023 1025 by Kerry Lara  Outcome: Progressing

## 2023-08-28 NOTE — CASE MANAGEMENT
Case Management Assessment & Discharge Planning Note    Patient name Araceli JAMA MS 66/W -83 MRN [id number]  : 1985 Date 2023       Current Admission Date: 2023  Current Admission Diagnosis:Essential hypertension   Patient Active Problem List    Diagnosis Date Noted   • S/P AKA (above knee amputation) unilateral, left ([address]) 2023   • Constipation 2023   • Status post fall 2023   • Postoperative pain 2023   • Pyuria 2023   • Postoperative blood loss anemia 2023   • Leukocytosis 2023   • Acute respiratory distress 2023   • Class 3 severe obesity due to excess calories without serious comorbidity with body mass index (BMI) of 40.0 to 44.9 in adult [facility]) 2022   • Surgical wound breakdown, sequela 10/28/2021   • Positive D dimer 10/02/2021   • Peripheral arterial disease ([address]) 2021   • S/p left BKA (below knee amputation)  2021   • Bursitis of left knee 2021   • Superficial femoral artery occlusion ([address]) 2021   • Morbid obesity 2021   • PAD (peripheral artery disease) 2021   • Peripheral artery disease ([address]) 2021   • Diarrhea 2021   • Bilateral leg pain 2021   • Leg pain 2020   • Insulin-dependent diabetes mellitus with neuropathy 2020   • Paresthesia 2020   • COPD (chronic obstructive pulmonary disease) ([address]) 2020   • Nicotine dependence 2020   • Syncope 2020   • Hypertensive urgency 2020   • Hydradenitis 2019   • Chronic bilateral low back pain without sciatica 2018   • Bipolar disorder 2018   • Cocaine use 10/24/2018   • Mixed hyperlipidemia 10/24/2018   • Diplopia 10/24/2018   • Type 2 diabetes mellitus ([address]) 10/24/2018   • Essential hypertension 10/22/2018   • Obstructive sleep apnea 2018   • Trichomoniasis 2018   • Diabetic neuropathy ([address]) 2018   • Suicide attempt ([address]) 2018   • Uncontrolled type 2 diabetes mellitus with diabetic polyneuropathy, without long-term current use of insulin 07/18/2018   • Moderate persistent asthma without complication 46/61/5551   • Diabetic autonomic neuropathy associated with type 2 diabetes mellitus ([address] [facility]) 04/10/2018      LOS (days): 3  Geometric Mean LOS (GMLOS) (days): 2.30  Days to GMLOS:-0.7     OBJECTIVE:    Risk of Unplanned Readmission Score: 31         Current admission status: Inpatient       Preferred Pharmacy:   [address] [place] [address] - [address]  [address]  [place] ([place]) [place] [address]  Phone: [phone] Fax: [phone]    [facility] [place], [address] [facility] [address]  [address] [facility] [address] [address]  Phone: [phone] Fax: [phone]    [address] [facility] [address], [address] [facility] [place] [address] [facility] [address] [address]  Phone: [phone] Fax: [phone]    Primary Care Provider: Geronimo Medeiros MD    Primary Insurance: MEDICARE  Secondary Insurance: Javan Little MA [facility]    ASSESSMENT:  [place] Proxies    There are no active Health Care Proxies on file. Readmission Root Cause  30 Day Readmission: No    Patient Information  Admitted from[de-identified] Home  Mental Status: Alert  During Assessment patient was accompanied by: Not accompanied during assessment  Assessment information provided by[de-identified] Patient  Primary Caregiver: Self  Support Systems: Self, Family members, Spouse/significant other  [place] of Residence: [facility] [address] [facility] do you live in?: [address] entry access options.  Select all that apply.: No steps to enter home  Type of Current Residence: Other (Comment) (Motel)  In the last 12 months, was there a time when you were not able to pay the mortgage or rent on time?: No  In the last 12 months, how many places have you lived?: 1  In the last 12 months, was there a time when you did not have a steady place to sleep or slept in a shelter (including now)?: No  Homeless/housing insecurity resource given?: N/A  Living Arrangements: Lives w/ Spouse/significant other    Activities of Daily Living Prior to Admission  Functional Status: Independent  Completes ADLs independently?: Yes  Ambulates independently?: Yes  Does patient use assisted devices?: Yes  Assisted Devices (DME) used: Wheelchair, Rollator, Walker (Prosthesis)  Does patient currently own DME?: Yes  What DME does the patient currently own?: Shayne Franklin Wheelchair (Prosthesis)  Does patient have a history of Outpatient Therapy (PT/OT)?: Yes  Does the patient have a history of Short-Term Rehab?: Yes    Patient Information Continued  Income Source: SSI/SSD  Does patient have prescription coverage?: Yes  Within the past 12 months, you worried that your food would run out before you got the money to buy more.: Never true  Within the past 12 months, the food you bought just didn't last and you didn't have money to get more.: Never true  Food insecurity resource given?: N/A  Does patient have a history of substance abuse?: Yes  Does patient have a history of Mental Health Diagnosis?: Yes    Means of Transportation  Means of Transport to Appts[de-identified] Family transport  In the past 12 months, has lack of transportation kept you from medical appointments or from getting medications?: No  In the past 12 months, has lack of transportation kept you from meetings, work, or from getting things needed for daily living?: No  Was application for public transport provided?: N/A        DISCHARGE DETAILS:    Discharge planning discussed with[de-identified] patient at bedside  Freedom of Choice: Yes     Other Referral/Resources/Interventions Provided:  Interventions: Transportation  Referral Comments: [facility] due to recurrent headache. Patient lives w/ her wife in a motel. Patient with recent AKA and h/o STR. Patient owns a walker, rollator, WC and prosthesis.  CM confirmed with patient at bedside she has all needed DME at home. Patient requesting a Lyft home- originally requested, however patient missed due to difficulty getting into [facility] with nurse. CM spoke with RN who states patient is not appropriate for a Lyft and would need a WCV. CM placed requested  for WCV ASAP in [facility]- not yet confirmed, RN TT attached for updated. CM confirmed wife will be there to let patient in. Patient stating she will be following up with OP PT/OT, already has the number and location of where she would like to go. No further CM needs anticipated at this time.     Would you like to participate in our [address] service program?  : No - Declined    Treatment Team Recommendation: Home  Discharge Destination Plan[de-identified] Home  Transport at Discharge : [address]  [facility]      IMM Given (Date):: 08/28/23  IMM Given to[de-identified] Patient

## 2023-08-29 ENCOUNTER — TELEPHONE (OUTPATIENT)
Dept: FAMILY MEDICINE CLINIC | Facility: CLINIC | Age: 38
End: 2023-08-29

## 2023-08-29 DIAGNOSIS — I73.9 PAD (PERIPHERAL ARTERY DISEASE) (HCC): ICD-10-CM

## 2023-08-29 LAB
ATRIAL RATE: 81 BPM
ATRIAL RATE: 97 BPM
P AXIS: 42 DEGREES
P AXIS: 53 DEGREES
PANC ISLET CELL AB TITR SER: NEGATIVE {TITER}
PR INTERVAL: 138 MS
PR INTERVAL: 150 MS
QRS AXIS: 42 DEGREES
QRS AXIS: 51 DEGREES
QRSD INTERVAL: 78 MS
QRSD INTERVAL: 84 MS
QT INTERVAL: 340 MS
QT INTERVAL: 364 MS
QTC INTERVAL: 422 MS
QTC INTERVAL: 431 MS
T WAVE AXIS: 2 DEGREES
T WAVE AXIS: 5 DEGREES
VENTRICULAR RATE: 81 BPM
VENTRICULAR RATE: 97 BPM

## 2023-08-29 PROCEDURE — 93010 ELECTROCARDIOGRAM REPORT: CPT | Performed by: INTERNAL MEDICINE

## 2023-08-30 DIAGNOSIS — Z79.4 TYPE 2 DIABETES MELLITUS WITH DIABETIC POLYNEUROPATHY, WITH LONG-TERM CURRENT USE OF INSULIN (HCC): ICD-10-CM

## 2023-08-30 DIAGNOSIS — Z89.612 S/P AKA (ABOVE KNEE AMPUTATION) UNILATERAL, LEFT (HCC): Primary | ICD-10-CM

## 2023-08-30 DIAGNOSIS — E11.42 TYPE 2 DIABETES MELLITUS WITH DIABETIC POLYNEUROPATHY, WITH LONG-TERM CURRENT USE OF INSULIN (HCC): ICD-10-CM

## 2023-08-30 LAB — GAD65 AB SER-ACNC: <5 U/ML (ref 0–5)

## 2023-08-30 RX ORDER — RIVAROXABAN 20 MG/1
TABLET, FILM COATED ORAL
Qty: 30 TABLET | Refills: 0 | Status: SHIPPED | OUTPATIENT
Start: 2023-08-30

## 2023-08-30 NOTE — ASSESSMENT & PLAN NOTE
Body mass index is 58.28 kg/m².     Recommend incorporation of WFP[employer] predominant diet along with decreasing consumption of red meats and processed foods  Per AHA guidelines, recommend moderate-vigorous intensity exercise for 30 minutes a day for 5 days a week or a total of 150 min/week

## 2023-08-30 NOTE — TELEPHONE ENCOUNTER
I put in a referral which I expect will cover the services she will need. If there is any problem with the referral please let me know.

## 2023-08-30 NOTE — ASSESSMENT & PLAN NOTE
Patient reported experiencing chest tightness/chest pain. Cardiac work-up in ED was negative for any signs of ischemia/no elevation in troponin, EKG NSR  Follows w/ cardiology as outpatient. Patient reports having a history of palpitations. Per cardiology patient will have 1 week of Zio patch and echocardiogram; plan for outpatient follow up.

## 2023-08-30 NOTE — ASSESSMENT & PLAN NOTE
Continue albuterol inhaler and fluticasone-vilanterol 200-25 mcg/actuation 1 puff  Follow up with pulmonology as outpatient

## 2023-08-30 NOTE — TELEPHONE ENCOUNTER
Caller: Jag Norton [facility] home health Care    Doctor: [address] [place] [address]     Reason for Call: Siva Angeles call requesting if the doctor can put in a order for Home health care how the Patient has new insurance. Siva Angeles fax number is [phone].  She states she would like the order as soon as possible how the patient just came out of the hospital.

## 2023-08-30 NOTE — ASSESSMENT & PLAN NOTE
Blood Pressure: 120/75      Patient blood pressure upon initial evaluation was elevated 160/110  Continue home regimen with Minipress 1 mg p.o. nightly and lisinopril

## 2023-08-30 NOTE — ASSESSMENT & PLAN NOTE
Denies any pain on L AKA  Continue statin, restart xarelto at discharge (previously held for potential LP)

## 2023-08-30 NOTE — ASSESSMENT & PLAN NOTE
POA: 41 yo morbidly obese F w/PMHx migraines, HTN, T2DM, HLD, presenting with severe persistent frontotemporal HA with 9/10 or 10/10 pain. Associated w/?blurry vision and nausea. States this AM (8/28) that headache is resolved. · On admission imaging with CT head showed no acute intracranial normalities; hypoattenuation left lentiform nucleus and corresponding left frontal horn and dilatation concerning for chronic infarction but new since prior 2020 study  · Patient was given Decadron, Benadryl, magnesium sulfate in ED for which she initially reported some relief. Holding decadron in hyperglycemia.     Plan:  - Recommend f/u with sleep medicine for sleep study/CPAP script  - Blood glucose control as below

## 2023-08-30 NOTE — ASSESSMENT & PLAN NOTE
Lab Results   Component Value Date    HGBA1C 9.9 (H) 08/25/2023       Recent Labs     08/27/23  2047 08/28/23  0739 08/28/23  1056 08/28/23  1730   POCGLU 214* 127 120 259*       Blood Sugar Average: Last 72 hrs:  (P) [id number]     · Poorly controlled blood sugars per chart review  · Home regimen with metformin 1000 mg daily and Trulicity 1.5 mg q. weekly, insulin glargine 45 units nightly, and sliding scale correction  · S/p Decadron during ED encounter for headache    Pending Ab testing per Endo    Plan:  - Continue gabapentin for neuropathy  - Lantus 30U q12h  - Humalog 16U with each meal, or 8U if not eating a full meal  - Log sugars at minimum prior to breakfast and dinner daily  - F/u outpatient with endocrinology  - Resume PTA metformin and Trulicity

## 2023-08-31 ENCOUNTER — TRANSITIONAL CARE MANAGEMENT (OUTPATIENT)
Dept: FAMILY MEDICINE CLINIC | Facility: CLINIC | Age: 38
End: 2023-08-31

## 2023-09-04 ENCOUNTER — HOSPITAL ENCOUNTER (EMERGENCY)
Facility: HOSPITAL | Age: 38
Discharge: HOME | End: 2023-09-04
Attending: EMERGENCY MEDICINE | Admitting: EMERGENCY MEDICINE
Payer: MEDICARE

## 2023-09-04 VITALS
TEMPERATURE: 98.6 F | HEART RATE: 91 BPM | RESPIRATION RATE: 20 BRPM | DIASTOLIC BLOOD PRESSURE: 70 MMHG | OXYGEN SATURATION: 98 % | WEIGHT: 289.02 LBS | HEIGHT: 61 IN | SYSTOLIC BLOOD PRESSURE: 137 MMHG | BODY MASS INDEX: 54.57 KG/M2

## 2023-09-04 DIAGNOSIS — R51.9 ACUTE NONINTRACTABLE HEADACHE, UNSPECIFIED HEADACHE TYPE: ICD-10-CM

## 2023-09-04 DIAGNOSIS — L02.91 ABSCESS: Primary | ICD-10-CM

## 2023-09-04 LAB
ANION GAP SERPL CALCULATED.3IONS-SCNC: 7 MMOL/L
BASOPHILS # BLD AUTO: 0.04 THOUSANDS/ÂΜL (ref 0–0.1)
BASOPHILS NFR BLD AUTO: 0 % (ref 0–1)
BUN SERPL-MCNC: 14 MG/DL (ref 5–25)
CALCIUM SERPL-MCNC: 8.7 MG/DL (ref 8.4–10.2)
CHLORIDE SERPL-SCNC: 101 MMOL/L (ref 96–108)
CO2 SERPL-SCNC: 28 MMOL/L (ref 21–32)
CREAT SERPL-MCNC: 0.43 MG/DL (ref 0.6–1.3)
EOSINOPHIL # BLD AUTO: 0.27 THOUSAND/ÂΜL (ref 0–0.61)
EOSINOPHIL NFR BLD AUTO: 2 % (ref 0–6)
ERYTHROCYTE [DISTWIDTH] IN BLOOD BY AUTOMATED COUNT: 20.5 % (ref 11.6–15.1)
GFR SERPL CREATININE-BSD FRML MDRD: 131 ML/MIN/1.73SQ M
GLUCOSE SERPL-MCNC: 113 MG/DL (ref 65–140)
HCT VFR BLD AUTO: 32.1 % (ref 34.8–46.1)
HGB BLD-MCNC: 10.5 G/DL (ref 11.5–15.4)
IMM GRANULOCYTES # BLD AUTO: 0.09 THOUSAND/UL (ref 0–0.2)
IMM GRANULOCYTES NFR BLD AUTO: 1 % (ref 0–2)
LYMPHOCYTES # BLD AUTO: 2.13 THOUSANDS/ÂΜL (ref 0.6–4.47)
LYMPHOCYTES NFR BLD AUTO: 16 % (ref 14–44)
MCH RBC QN AUTO: 21.6 PG (ref 26.8–34.3)
MCHC RBC AUTO-ENTMCNC: 32.7 G/DL (ref 31.4–37.4)
MCV RBC AUTO: 66 FL (ref 82–98)
MONOCYTES # BLD AUTO: 0.59 THOUSAND/ÂΜL (ref 0.17–1.22)
MONOCYTES NFR BLD AUTO: 4 % (ref 4–12)
NEUTROPHILS # BLD AUTO: 10.23 THOUSANDS/ÂΜL (ref 1.85–7.62)
NEUTS SEG NFR BLD AUTO: 77 % (ref 43–75)
NRBC BLD AUTO-RTO: 0 /100 WBCS
PLATELET # BLD AUTO: 477 THOUSANDS/UL (ref 149–390)
PMV BLD AUTO: 9.6 FL (ref 8.9–12.7)
POTASSIUM SERPL-SCNC: 4.1 MMOL/L (ref 3.5–5.3)
RBC # BLD AUTO: 4.86 MILLION/UL (ref 3.81–5.12)
SODIUM SERPL-SCNC: 136 MMOL/L (ref 135–147)
WBC # BLD AUTO: 13.35 THOUSAND/UL (ref 4.31–10.16)

## 2023-09-04 PROCEDURE — 96374 THER/PROPH/DIAG INJ IV PUSH: CPT

## 2023-09-04 PROCEDURE — 87147 CULTURE TYPE IMMUNOLOGIC: CPT

## 2023-09-04 PROCEDURE — 96375 TX/PRO/DX INJ NEW DRUG ADDON: CPT

## 2023-09-04 PROCEDURE — 87070 CULTURE OTHR SPECIMN AEROBIC: CPT

## 2023-09-04 PROCEDURE — 36415 COLL VENOUS BLD VENIPUNCTURE: CPT

## 2023-09-04 PROCEDURE — 80048 BASIC METABOLIC PNL TOTAL CA: CPT

## 2023-09-04 PROCEDURE — 85025 COMPLETE CBC W/AUTO DIFF WBC: CPT

## 2023-09-04 PROCEDURE — 99285 EMERGENCY DEPT VISIT HI MDM: CPT | Performed by: EMERGENCY MEDICINE

## 2023-09-04 PROCEDURE — 99283 EMERGENCY DEPT VISIT LOW MDM: CPT

## 2023-09-04 PROCEDURE — 96361 HYDRATE IV INFUSION ADD-ON: CPT

## 2023-09-04 PROCEDURE — 87205 SMEAR GRAM STAIN: CPT

## 2023-09-04 RX ORDER — KETOROLAC TROMETHAMINE 30 MG/ML
15 INJECTION, SOLUTION INTRAMUSCULAR; INTRAVENOUS ONCE
Status: COMPLETED | OUTPATIENT
Start: 2023-09-04 | End: 2023-09-04

## 2023-09-04 RX ORDER — DOXYCYCLINE HYCLATE 100 MG/1
100 CAPSULE ORAL ONCE
Status: COMPLETED | OUTPATIENT
Start: 2023-09-04 | End: 2023-09-04

## 2023-09-04 RX ORDER — DOXYCYCLINE HYCLATE 100 MG/1
100 CAPSULE ORAL EVERY 12 HOURS SCHEDULED
Qty: 10 CAPSULE | Refills: 0 | Status: SHIPPED | OUTPATIENT
Start: 2023-09-04 | End: 2023-09-09

## 2023-09-04 RX ORDER — ACETAMINOPHEN 325 MG/1
975 TABLET ORAL ONCE
Status: COMPLETED | OUTPATIENT
Start: 2023-09-04 | End: 2023-09-04

## 2023-09-04 RX ORDER — METOCLOPRAMIDE HYDROCHLORIDE 5 MG/ML
10 INJECTION INTRAMUSCULAR; INTRAVENOUS ONCE
Status: COMPLETED | OUTPATIENT
Start: 2023-09-04 | End: 2023-09-04

## 2023-09-04 RX ORDER — DIPHENHYDRAMINE HYDROCHLORIDE 50 MG/ML
25 INJECTION INTRAMUSCULAR; INTRAVENOUS ONCE
Status: COMPLETED | OUTPATIENT
Start: 2023-09-04 | End: 2023-09-04

## 2023-09-04 RX ADMIN — DIPHENHYDRAMINE HYDROCHLORIDE 25 MG: 50 INJECTION, SOLUTION INTRAMUSCULAR; INTRAVENOUS at 09:49

## 2023-09-04 RX ADMIN — ACETAMINOPHEN 975 MG: 325 TABLET, FILM COATED ORAL at 09:47

## 2023-09-04 RX ADMIN — KETOROLAC TROMETHAMINE 15 MG: 30 INJECTION, SOLUTION INTRAMUSCULAR; INTRAVENOUS at 09:49

## 2023-09-04 RX ADMIN — METOCLOPRAMIDE 10 MG: 5 INJECTION, SOLUTION INTRAMUSCULAR; INTRAVENOUS at 09:49

## 2023-09-04 RX ADMIN — SODIUM CHLORIDE 1000 ML: 0.9 INJECTION, SOLUTION INTRAVENOUS at 09:49

## 2023-09-04 RX ADMIN — DOXYCYCLINE 100 MG: 100 CAPSULE ORAL at 09:52

## 2023-09-04 NOTE — Clinical Note
Robb Ornelas was seen and treated in our emergency department on 9/4/2023. No restrictions            Diagnosis:     Tony  . She may return on this date: 09/06/2023         If you have any questions or concerns, please don't hesitate to call.       Yumiko Wise MD    ______________________________           _______________          _______________  Hospital Representative                              Date                                Time

## 2023-09-04 NOTE — DISCHARGE INSTRUCTIONS
-Please take doxycycline as prescribed  -Please follow-up with your primary care physician or emergency department in 2 days for wound reevaluation.  -Patient return to the emergency department if you begin to develop fevers, chills, inability to urinate, continued or worsening symptoms

## 2023-09-04 NOTE — ED PROVIDER NOTES
History  Chief Complaint   Patient presents with   • Abscess     Vaginal, pain been putting OTC cream on it. Thinks it burst in the last few days +burning, pain and bleeding. Pain 10/10. Took 4 Tylenol at 3am   • Headache - Recurrent or Known Dx Migraines     Seen last Monday for same, came back 2 days ago unable to get to go away. Pain 7/9     16-year-old female with history of asthma, bipolar disorder, depression, DM, HTN presents with headache and abscess. Patient states generalized pounding headache for which she was here 2 days ago with no relief from headache cocktail. Similar to previous headaches. Denies trauma. Denies vision changes, weakness, slurred speech, confusion, fevers, neck pain. History of abscess X1 week on right side of perineum. Has been draining bloody fluid. Associated burning when urine comes in contact with it. Attempted applying barrier cream without relief. History of diabetes, glucose in the 200s yesterday. States compliant with medications. Currently living in a motel. Has been unable to shower or bathe due to generalized weakness, wife has been cleaning her. Endorses headache, abscess with localized pain, chills, generalized weakness, urinary frequency. Denies fevers, confusion, FND, abdominal pain, nausea, vomiting, dysuria, urgency, constipation, diarrhea, changes in urinary or bowel habits, chest pain, shortness of breath. Abscess  Headache - Recurrent or Known Dx Migraines      Prior to Admission Medications   Prescriptions Last Dose Informant Patient Reported? Taking? Advair -21 MCG/ACT inhaler   No No   Sig: Inhale 2 puffs 2 (two) times a day Rinse mouth after use   Alcohol Swabs (Pharmacist Choice Alcohol) PADS  Outside Facility (Specify), Self No No   Sig: USE 3-4 TIMES AS DIRECTED.    Blood Pressure Monitoring (Blood Pressure Monitor Automat) [address] (Specify), Self No No   Sig: Use Daily as Directed   Comfort EZ Pen Needles 33G X 4 MM MIS  Outside Facility (Specify), Self No No   Sig: USE AS DIRECTED   DULoxetine (CYMBALTA) 20 mg capsule   No No   Sig: Take 2 capsules (40 mg total) by mouth daily   Global Inject Ease Lancets 30G MIS  Outside Facility (Specify), Self No No   Sig: USE 3 (THREE) TIMES A DAY   HYDROmorphone (DILAUDID) 2 mg tablet  Outside Facility (Specify), Self No No   Sig: Take 1 (2mg) to 2 tabs (4mg) by mouth every 4 hours as needed for moderate to severe pain   Insulin Pen Needle (Comfort EZ Pen Needles) 32G X 4 MM MISC  Outside Facility (Specify), Self No No   Sig: Inject under the skin 4 (four) times a day (before meals and at bedtime)   OneTouch Ultra test strip   Yes No   Sig: TEST TWICE DAILY ()   QUEtiapine (SEROquel) 200 mg tablet  Outside Facility (Specify), Self Yes No   Sig: Take by mouth daily at bedtime   Patient not taking: Reported on 3/93/6985   Trulicity 1.5 ZA/4.0ZT injection  Outside Facility (Specify), Self Yes No   Sig: UNCERTAIN OF DOSE   Xarelto 20 MG tablet   No No   Sig: TAKE 1 TABLET (20 MG TOTAL) BY MOUTH DAILY WITH BREAKFAST   albuterol (PROVENTIL HFA,VENTOLIN HFA) 90 mcg/act inhaler   No No   Sig: Inhale 2 puffs every 4 (four) hours as needed for wheezing or shortness of breath   aspirin 81 mg chewable tablet   No No   Sig: Chew 1 tablet (81 mg total) daily   atorvastatin (LIPITOR) 40 mg tablet   No No   Sig: Take 1 tablet (40 mg total) by mouth daily with dinner   gabapentin (NEURONTIN) 300 mg capsule  Outside Facility (Specify), Self No No   Sig: Take 1 capsule (300 mg total) by mouth 3 (three) times a day   glycerin-hypromellose- (ARTIFICIAL TEARS) 0.2-0.2-1 % SOLN   No No   Sig: Administer 2 drops to both eyes every 6 (six) hours as needed (for eye discomfort)   hydrOXYzine HCL (ATARAX) 50 mg tablet  Outside Facility (Specify), Self No No   Sig: Take 1 tablet (50 mg total) by mouth every 8 (eight) hours as needed for itching or anxiety   insulin glargine (LANTUS) 100 units/mL subcutaneous injection  Outside Facility (Specify), Self No No   Sig: Inject 45 Units under the skin daily at bedtime   insulin lispro (HumaLOG) 100 units/mL injection  Outside Facility (Specify), Self No No   Sig: Inject 2-12 Units under the skin 3 (three) times a day before meals   Patient not taking: Reported on 8/16/2023   insulin lispro (HumaLOG) 100 units/mL injection  Outside Facility (Specify), Self No No   Sig: Inject 20 Units under the skin daily with breakfast Do not start before April 20, 2023.    Patient not taking: Reported on 8/24/2023   ketotifen (ZADITOR) 0.025 % ophthalmic solution   No No   Sig: Administer 1 drop to both eyes 2 (two) times a day as needed (for eye discomfort)   lisinopril (ZESTRIL) 20 mg tablet   No No   Sig: Take 1 tablet (20 mg total) by mouth daily   metFORMIN (GLUCOPHAGE) 1000 MG tablet  Outside Facility (Specify), Self No No   Sig: TAKE ONE (1) TABLET BY MOUTH TWICE DAILY WITH FOOD   methocarbamol (ROBAXIN) 750 mg tablet  Outside Facility (Specify), Self No No   Sig: Take 1 tablet (750 mg total) by mouth every 6 (six) hours as needed for muscle spasms   Patient not taking: Reported on 8/16/2023   nicotine (NICODERM CQ) 21 mg/24 hr TD 24 hr patch   No No   Sig: Place 1 patch on the skin over 24 hours daily   Patient not taking: Reported on 8/16/2023   polyethylene glycol (MIRALAX) 17 g packet  Outside Facility (Specify), Self No No   Sig: Take 17 g by mouth daily   Patient not taking: Reported on 8/16/2023   prazosin (MINIPRESS) 1 mg capsule   No No   Sig: Take 1 capsule (1 mg total) by mouth daily at bedtime   simethicone (MYLICON) 80 mg chewable tablet  Outside Facility (Specify), Self No No   Sig: Chew 1 tablet (80 mg total) every 6 (six) hours as needed for flatulence   Patient not taking: Reported on 8/16/2023   topiramate (TOPAMAX) 25 mg tablet  Outside Facility (Specify), Self No No   Sig: Take 1 tablet (25 mg total) by mouth 2 (two) times a day Facility-Administered Medications: None       Past Medical History:   Diagnosis Date   • Asthma    • Atherosclerosis    • Bipolar 1 disorder ([address] )    • COPD (chronic obstructive pulmonary disease) ([facility])    • Depression    • Diabetes mellitus ([address] [facility])    • Hypertension    • Psychiatric disorder     cutting history   • PTSD (post-traumatic stress disorder)    • Schizoaffective disorder ([address])    • Tendonitis        Past Surgical History:   Procedure Laterality Date   • AMPUTATION ABOVE KNEE (AKA) Left 3/29/2023    Procedure: AMPUTATION ABOVE KNEE (AKA); Surgeon: Denita Naidu MD;  Location: [facility];  Service: Vascular   • BYPASS FEMORAL-POPLITEAL Left 2021    Procedure: Left Common Femoral Below Knee to Popliteal Bypass with Insitu GSV graft. Left Lower Extremity Angiogram;  Surgeon: Denita Naidu MD;  Location: [facility];  Service: Vascular   •  SECTION     • EAR SURGERY     • IR LOWER EXTREMITY ANGIOGRAM  2021   • IR LOWER EXTREMITY ANGIOGRAM  2021   • IA SLCTV CATHJ 3RD+ ORD SLCTV ABDL PEL/LXTR [facility] Left 3/24/2023    Procedure: Left leg angiogram;  Surgeon: Bronson Forbes DO;  Location: [facility];  Service: Vascular   • TUBAL LIGATION         Family History   Problem Relation Age of Onset   • Hypertension Mother    • Diabetes Mother    • HIV Mother    • Heart disease Mother    • No Known Problems Father      I have reviewed and agree with the history as documented.     E-Cigarette/Vaping   • E-Cigarette Use Never User      E-Cigarette/Vaping Substances   • Nicotine No    • THC No    • CBD No    • Flavoring No    • Other No    • Unknown No      Social History     Tobacco Use   • Smoking status: Former     Packs/day: 1.00     Years: 20.00     Total pack years: 20.00     Types: Cigarettes     Quit date: 3/24/2023     Years since quittin.4   • Smokeless tobacco: Former     Quit date: 2022   Vaping Use   • Vaping Use: Never used   Substance Use Topics   • Alcohol use: Not Currently Comment: not currently   • Drug use: Not Currently     Types: Cocaine, Marijuana, "Crack" cocaine     Comment: 1 years clean from crack cocaine since 2022        Review of Systems   All other systems reviewed and are negative. Physical Exam  ED Triage Vitals [09/04/23 0833]   Temperature Pulse Respirations Blood Pressure SpO2   98.6 °F (37 °C) 91 20 137/70 98 %      Temp Source Heart Rate Source Patient Position - Orthostatic VS BP Location FiO2 (%)   Oral Monitor Lying Right arm --      Pain Score       10 - Worst Possible Pain             Orthostatic Vital Signs  Vitals:    09/04/23 0833   BP: 137/70   Pulse: 91   Patient Position - Orthostatic VS: Lying       Physical Exam  Vitals and nursing note reviewed. Exam conducted with a chaperone present. Constitutional:       General: She is in acute distress. Appearance: Normal appearance. She is obese. She is not ill-appearing, toxic-appearing or diaphoretic. HENT:      Head: Normocephalic and atraumatic. Right Ear: External ear normal.      Left Ear: External ear normal.      Nose: Nose normal.      Mouth/Throat:      Mouth: Mucous membranes are moist.      Pharynx: Oropharynx is clear. Eyes:      General: No scleral icterus. Right eye: No discharge. Left eye: No discharge. Extraocular Movements: Extraocular movements intact. Conjunctiva/sclera: Conjunctivae normal.      Pupils: Pupils are equal, round, and reactive to light. Cardiovascular:      Rate and Rhythm: Normal rate and regular rhythm. Pulses: Normal pulses. Heart sounds: Normal heart sounds. No murmur heard. No friction rub. No gallop. Pulmonary:      Effort: Pulmonary effort is normal. No respiratory distress. Breath sounds: Normal breath sounds. No stridor. No wheezing, rhonchi or rales. Chest:      Chest wall: No tenderness. Abdominal:      General: There is no distension. Palpations: Abdomen is soft. There is no mass. Tenderness: There is no abdominal tenderness. There is no guarding or rebound. Hernia: No hernia is present. Genitourinary:     General: Normal vulva. Musculoskeletal:         General: No swelling, tenderness, deformity or signs of injury. Cervical back: Normal range of motion and neck supple. No rigidity or tenderness. Right lower leg: No edema. Left lower leg: No edema. Comments: Left above-knee amputation. Skin:     General: Skin is warm and dry. Capillary Refill: Capillary refill takes less than 2 seconds. Coloration: Skin is not cyanotic, jaundiced or pale. Findings: No bruising, erythema, lesion, petechiae or rash. Neurological:      General: No focal deficit present. Mental Status: She is alert and oriented to person, place, and time. Mental status is at baseline. Cranial Nerves: No cranial nerve deficit. Sensory: No sensory deficit. Motor: No weakness. ED Medications  Medications   sodium chloride 0.9 % bolus 1,000 mL (0 mL Intravenous Stopped 9/4/23 1325)   ketorolac (TORADOL) injection 15 mg (15 mg Intravenous Given 9/4/23 0949)   acetaminophen (TYLENOL) tablet 975 mg (975 mg Oral Given 9/4/23 0947)   diphenhydrAMINE (BENADRYL) injection 25 mg (25 mg Intravenous Given 9/4/23 0949)   metoclopramide (REGLAN) injection 10 mg (10 mg Intravenous Given 9/4/23 0949)   doxycycline hyclate (VIBRAMYCIN) capsule 100 mg (100 mg Oral Given 9/4/23 0952)       Diagnostic Studies  Results Reviewed     Procedure Component Value Units Date/Time    Wound culture and Gram stain [[id number]] Collected: 09/04/23 1328    Lab Status:  In process Specimen: Wound from Perineum Updated: 09/04/23 2131    Basic metabolic panel [[id number]]  (Abnormal) Collected: 09/04/23 0946    Lab Status: Final result Specimen: Blood from Arm, Left Updated: 09/04/23 1012     Sodium 136 mmol/L      Potassium 4.1 mmol/L      Chloride 101 mmol/L      CO2 28 mmol/L      ANION GAP 7 mmol/L      BUN 14 mg/dL      Creatinine 0.43 mg/dL      Glucose 113 mg/dL      Calcium 8.7 mg/dL      eGFR 131 ml/min/1.73sq m     Narrative:      [place] guidelines for Chronic Kidney Disease (CKD):   •  Stage 1 with normal or high GFR (GFR > 90 mL/min/1.73 square meters)  •  Stage 2 Mild CKD (GFR = 60-89 mL/min/1.73 square meters)  •  Stage 3A Moderate CKD (GFR = 45-59 mL/min/1.73 square meters)  •  Stage 3B Moderate CKD (GFR = 30-44 mL/min/1.73 square meters)  •  Stage 4 Severe CKD (GFR = 15-29 mL/min/1.73 square meters)  •  Stage 5 End Stage CKD (GFR <15 mL/min/1.73 square meters)  Note: GFR calculation is accurate only with a steady state creatinine    CBC and differential [[id number]]  (Abnormal) Collected: 09/04/23 0946    Lab Status: Final result Specimen: Blood from Arm, Left Updated: 09/04/23 0957     WBC 13.35 Thousand/uL      RBC 4.86 Million/uL      Hemoglobin 10.5 g/dL      Hematocrit 32.1 %      MCV 66 fL      MCH 21.6 pg      MCHC 32.7 g/dL      RDW 20.5 %      MPV 9.6 fL      Platelets 576 Thousands/uL      nRBC 0 /100 WBCs      Neutrophils Relative 77 %      Immat GRANS % 1 %      Lymphocytes Relative 16 %      Monocytes Relative 4 %      Eosinophils Relative 2 %      Basophils Relative 0 %      Neutrophils Absolute 10.23 Thousands/µL      Immature Grans Absolute 0.09 Thousand/uL      Lymphocytes Absolute 2.13 Thousands/µL      Monocytes Absolute 0.59 Thousand/µL      Eosinophils Absolute 0.27 Thousand/µL      Basophils Absolute 0.04 Thousands/µL                  No orders to display         Procedures  Procedures      ED Course  ED Course as of 09/04/23 1759   Mon Sep 04, 2023   0671 8/24/23 CTH:   IMPRESSION:     No acute intracranial abnormality. Hypoattenuation left lentiform nucleus and corresponding left frontal horn ex vacuo dilatation concerning for chronic infarction but new since prior 2020 study.  Consider follow-up nonemergent MRI of the brain especially   given the patient's age.     The study was marked in EPIC for significant notification.                 Workstation performed: [id number]   24[address] relieved, resting in bed comfortably, NAD. SBIRT 20yo+    Flowsheet Row Most Recent Value   Initial Alcohol Screen: US AUDIT-C     1. How often do you have a drink containing alcohol? 0 Filed at: 09/04/2023 0833   2. How many drinks containing alcohol do you have on a typical day you are drinking? 0 Filed at: 09/04/2023 0833   3a. Male UNDER 65: How often do you have five or more drinks on one occasion? 0 Filed at: 09/04/2023 0833   3b. FEMALE Any Age, or MALE 65+: How often do you have 4 or more drinks on one occassion? 0 Filed at: 09/04/2023 6729   Audit-C Score 0 Filed at: 09/04/2023 4331   CRISTÓBAL: How many times in the past year have you. .. Used an illegal drug or used a prescription medication for non-medical reasons? Never Filed at: 09/04/2023 7745                Medical Decision Making  67-year-old female presents with headaches, and abscess. Headache is similar to previous headaches, no neurovascular compromise, nonpregnant, afebrile, no nuchal signs, normal blood pressure, previous imaging at last visit, unlikely press, help, meningitis, CVA, SAH. Patient states 1 week history of abscess that is now draining and right side of perineum. Afebrile, no SIRS criteria, self draining, unlikely systemic bacteremia or sepsis. Patient given headache cocktail with improvement in symptoms, continued normal neuro exam.  Noted draining abscess over perineum area tracking up to right labia and right buttocks, possible abscess versus hidradenitis suppurativa, no other skin lesions. Patient given doxycycline and discussed using hot sitz bath's and warm compresses. Patient was discharged home to self-care with strict return precautions for signs and symptoms of evolving infection, continued or worsening symptoms.   Advised to follow-up within 2 days with PCP or emergency department for wound check. Patient understanding and agreement with plan. Amount and/or Complexity of Data Reviewed  Labs: ordered. Risk  OTC drugs. Prescription drug management. Disposition  Final diagnoses:   Abscess   Acute nonintractable headache, unspecified headache type     Time reflects when diagnosis was documented in both MDM as applicable and the Disposition within this note     Time User Action Codes Description Comment    9/4/2023 11:00 AM Lupe Sheridan Add [L02.91] Abscess     9/4/2023 11:00 AM Lupe Sheridan Add [R51.9] Acute nonintractable headache, unspecified headache type       ED Disposition     ED Disposition   Discharge    Condition   Stable    Date/Time   Mon Sep 4, 2023 11:00 AM    Comment   Yaya Butcher discharge to home/self care.                Follow-up Information     Follow up With Specialties Details Why Contact Info Additional Information    [facility] Internal Medicine [facility] Internal Medicine Schedule an appointment as soon as possible for a visit  If symptoms worsen [address] [facility] [id number]  [address] [facility] Internal Medicine [facility], [address] [place] [address], [address] [place] [address] Emergency Department Emergency Medicine Go to  If symptoms worsen [address]  Emergency Department, [place], [place], [address]          Discharge Medication List as of 9/4/2023 11:02 AM      START taking these medications    Details   doxycycline hyclate (VIBRAMYCIN) 100 mg capsule Take 1 capsule (100 mg total) by mouth every 12 (twelve) hours for 5 days, Starting Mon 9/4/2023, Until Sat 9/9/2023, Normal         CONTINUE these medications which have NOT CHANGED    Details   Advair -21 MCG/ACT inhaler Inhale 2 puffs 2 (two) times a day Rinse mouth after use, Starting Wed 8/9/2023, Normal      albuterol (PROVENTIL HFA,VENTOLIN HFA) 90 mcg/act inhaler Inhale 2 puffs every 4 (four) hours as needed for wheezing or shortness of breath, Starting Wed 8/9/2023, Normal      Alcohol Swabs (Pharmacist Choice Alcohol) PADS USE 3-4 TIMES AS DIRECTED., Normal      aspirin 81 mg chewable tablet Chew 1 tablet (81 mg total) daily, Starting Wed 8/9/2023, Normal      atorvastatin (LIPITOR) 40 mg tablet Take 1 tablet (40 mg total) by mouth daily with dinner, Starting Wed 8/9/2023, Normal      Blood Pressure Monitoring (Blood Pressure Monitor Automat) KATLYN Use Daily as Directed, Print      !!  Comfort EZ Pen Needles 33G X 4 MM MISC USE AS DIRECTED, Normal      DULoxetine (CYMBALTA) 20 mg capsule Take 2 capsules (40 mg total) by mouth daily, Starting Mon 7/10/2023, Until Sun 10/8/2023, Normal      gabapentin (NEURONTIN) 300 mg capsule Take 1 capsule (300 mg total) by mouth 3 (three) times a day, Starting Wed 4/19/2023, No Print      Global Inject Ease Lancets 30G MISC USE 3 (THREE) TIMES A DAY, Starting Tue 2/22/2022, Normal      glycerin-hypromellose- (ARTIFICIAL TEARS) 0.2-0.2-1 % SOLN Administer 2 drops to both eyes every 6 (six) hours as needed (for eye discomfort), Starting Mon 8/28/2023, Normal      HYDROmorphone (DILAUDID) 2 mg tablet Take 1 (2mg) to 2 tabs (4mg) by mouth every 4 hours as needed for moderate to severe pain, Print      hydrOXYzine HCL (ATARAX) 50 mg tablet Take 1 tablet (50 mg total) by mouth every 8 (eight) hours as needed for itching or anxiety, Starting Wed 4/19/2023, No Print      insulin glargine (LANTUS) 100 units/mL subcutaneous injection Inject 45 Units under the skin daily at bedtime, Starting Wed 4/19/2023, No Print      !! insulin lispro (HumaLOG) 100 units/mL injection Inject 2-12 Units under the skin 3 (three) times a day before meals, Starting Wed 4/19/2023, No Print      !! insulin lispro (HumaLOG) 100 units/mL injection Inject 20 Units under the skin daily with breakfast Do not start before April 20, 2023., Starting Thu 4/20/2023, No Print      !! Insulin Pen Needle (Comfort EZ Pen Needles) 32G X 4 MM MISC Inject under the skin 4 (four) times a day (before meals and at bedtime), Starting Tue 1/24/2023, Normal      ketotifen (ZADITOR) 0.025 % ophthalmic solution Administer 1 drop to both eyes 2 (two) times a day as needed (for eye discomfort), Starting Mon 8/28/2023, Normal      lisinopril (ZESTRIL) 20 mg tablet Take 1 tablet (20 mg total) by mouth daily, Starting Wed 8/9/2023, Normal      metFORMIN (GLUCOPHAGE) 1000 MG tablet TAKE ONE (1) TABLET BY MOUTH TWICE DAILY WITH FOOD, Normal      methocarbamol (ROBAXIN) 750 mg tablet Take 1 tablet (750 mg total) by mouth every 6 (six) hours as needed for muscle spasms, Starting Wed 4/19/2023, No Print      nicotine (NICODERM CQ) 21 mg/24 hr TD 24 hr patch Place 1 patch on the skin over 24 hours daily, Starting Mon 7/10/2023, Normal      OneTouch Ultra test strip TEST TWICE DAILY (), Historical Med      polyethylene glycol (MIRALAX) 17 g packet Take 17 g by mouth daily, Starting Wed 4/19/2023, No Print      prazosin (MINIPRESS) 1 mg capsule Take 1 capsule (1 mg total) by mouth daily at bedtime, Starting Wed 8/9/2023, Normal      QUEtiapine (SEROquel) 200 mg tablet Take by mouth daily at bedtime, Starting Wed 3/8/2023, Historical Med      simethicone (MYLICON) 80 mg chewable tablet Chew 1 tablet (80 mg total) every 6 (six) hours as needed for flatulence, Starting Wed 4/19/2023, No Print      topiramate (TOPAMAX) 25 mg tablet Take 1 tablet (25 mg total) by mouth 2 (two) times a day, Starting Wed 4/19/2023, No Print      Trulicity 1.5 QX/7.9JM injection UNCERTAIN OF DOSE, Starting Sat 4/29/2023, Historical Med      Xarelto 20 MG tablet TAKE 1 TABLET (20 MG TOTAL) BY MOUTH DAILY WITH BREAKFAST, Normal       !! - Potential duplicate medications found. Please discuss with provider.         No discharge procedures on file. PDMP Review       Value Time User    PDMP Reviewed  Yes 8/24/2023  6:24 PM Leonor Holmanchure, DO           ED Provider  Attending physically available and evaluated Mercedes Nieves. I managed the patient along with the ED Attending.     Electronically Signed by         Mini Couch MD  09/04/23 1690

## 2023-09-06 ENCOUNTER — TELEPHONE (OUTPATIENT)
Dept: FAMILY MEDICINE CLINIC | Facility: OTHER | Age: 38
End: 2023-09-06

## 2023-09-06 LAB
BACTERIA WND AEROBE CULT: ABNORMAL
BACTERIA WND AEROBE CULT: ABNORMAL
GRAM STN SPEC: ABNORMAL
GRAM STN SPEC: ABNORMAL

## 2023-09-06 NOTE — ED ATTENDING ATTESTATION
9/4/2023  ILauren MD, saw and evaluated the patient. I have discussed the patient with the resident/non-physician practitioner and agree with the resident's/non-physician practitioner's findings, Plan of Care, and MDM as documented in the resident's/non-physician practitioner's note, except where noted. All available labs and Radiology studies were reviewed. I was present for key portions of any procedure(s) performed by the resident/non-physician practitioner and I was immediately available to provide assistance. At this point I agree with the current assessment done in the Emergency Department.   I have conducted an independent evaluation of this patient a history and physical is as follows:see h and p above- agree with er resident tx plan / dispo     ED Course         Critical Care Time  Procedures

## 2023-09-06 NOTE — TELEPHONE ENCOUNTER
Tanvir Nieto (PT therapist) from [address] [facility] called and said pt is refusing home care she wants outpatient therapy.      Today was the first PT session he had with patient     Thank you

## 2023-09-11 ENCOUNTER — RA CDI HCC (OUTPATIENT)
Dept: OTHER | Facility: HOSPITAL | Age: 38
End: 2023-09-11

## 2023-09-11 ENCOUNTER — TELEPHONE (OUTPATIENT)
Dept: FAMILY MEDICINE CLINIC | Facility: CLINIC | Age: 38
End: 2023-09-11

## 2023-09-11 NOTE — TELEPHONE ENCOUNTER
Caller: Nurse Anival Fritz      Doctor: Ricky Brooks      Reason for call: Nurse Anival Fritz called wanted to inform the doctor about the patient. Today her sugar was 155, patient blood pressure was 140/80 and the patient heart rate was 105. Anival Fritz states today the patient was tried and told her she is always having chest pain. Anival Fritz was telling her all the signs to look for if she is having a stoke.        Number: [phone]

## 2023-09-11 NOTE — PROGRESS NOTES
[address] [facility] coding opportunities          Chart Reviewed number of suggestions sent to Provider: 2   E11.65  E11.51    Patients Insurance     Medicare Insurance: [employer]

## 2023-09-12 DIAGNOSIS — E11.42 TYPE 2 DIABETES MELLITUS WITH DIABETIC POLYNEUROPATHY, WITH LONG-TERM CURRENT USE OF INSULIN (HCC): Primary | ICD-10-CM

## 2023-09-12 DIAGNOSIS — I10 HYPERTENSION: ICD-10-CM

## 2023-09-12 DIAGNOSIS — Z79.4 TYPE 2 DIABETES MELLITUS WITH DIABETIC POLYNEUROPATHY, WITH LONG-TERM CURRENT USE OF INSULIN (HCC): Primary | ICD-10-CM

## 2023-09-12 RX ORDER — DULAGLUTIDE 1.5 MG/.5ML
INJECTION, SOLUTION SUBCUTANEOUS
Qty: 2 ML | Refills: 1 | Status: SHIPPED | OUTPATIENT
Start: 2023-09-12

## 2023-09-12 RX ORDER — LISINOPRIL 10 MG/1
TABLET ORAL
Qty: 60 TABLET | Refills: 2 | OUTPATIENT
Start: 2023-09-12

## 2023-09-18 ENCOUNTER — OFFICE VISIT (OUTPATIENT)
Dept: FAMILY MEDICINE CLINIC | Facility: CLINIC | Age: 38
End: 2023-09-18
Payer: COMMERCIAL

## 2023-09-18 VITALS
RESPIRATION RATE: 18 BRPM | SYSTOLIC BLOOD PRESSURE: 130 MMHG | OXYGEN SATURATION: 98 % | WEIGHT: 287 LBS | BODY MASS INDEX: 54.19 KG/M2 | HEART RATE: 115 BPM | TEMPERATURE: 98.6 F | HEIGHT: 61 IN | DIASTOLIC BLOOD PRESSURE: 90 MMHG

## 2023-09-18 DIAGNOSIS — I10 HYPERTENSION, UNSPECIFIED TYPE: ICD-10-CM

## 2023-09-18 DIAGNOSIS — E11.42 TYPE 2 DIABETES MELLITUS WITH DIABETIC POLYNEUROPATHY, WITH LONG-TERM CURRENT USE OF INSULIN (HCC): Primary | ICD-10-CM

## 2023-09-18 DIAGNOSIS — M79.605 BILATERAL LEG PAIN: ICD-10-CM

## 2023-09-18 DIAGNOSIS — F31.75 BIPOLAR DISORDER, IN PARTIAL REMISSION, MOST RECENT EPISODE DEPRESSED (HCC): ICD-10-CM

## 2023-09-18 DIAGNOSIS — Z72.0 TOBACCO CONSUMPTION: ICD-10-CM

## 2023-09-18 DIAGNOSIS — E11.9 DIABETES (HCC): ICD-10-CM

## 2023-09-18 DIAGNOSIS — F17.200 NICOTINE DEPENDENCE, UNCOMPLICATED, UNSPECIFIED NICOTINE PRODUCT TYPE: ICD-10-CM

## 2023-09-18 DIAGNOSIS — M79.604 BILATERAL LEG PAIN: ICD-10-CM

## 2023-09-18 DIAGNOSIS — I10 HYPERTENSION: ICD-10-CM

## 2023-09-18 DIAGNOSIS — Z79.4 TYPE 2 DIABETES MELLITUS WITH DIABETIC POLYNEUROPATHY, WITH LONG-TERM CURRENT USE OF INSULIN (HCC): Primary | ICD-10-CM

## 2023-09-18 DIAGNOSIS — Z79.4 TYPE 2 DIABETES MELLITUS WITH DIABETIC POLYNEUROPATHY, WITH LONG-TERM CURRENT USE OF INSULIN (HCC): ICD-10-CM

## 2023-09-18 DIAGNOSIS — E11.42 TYPE 2 DIABETES MELLITUS WITH DIABETIC POLYNEUROPATHY, WITH LONG-TERM CURRENT USE OF INSULIN (HCC): ICD-10-CM

## 2023-09-18 DIAGNOSIS — G89.18 POSTOPERATIVE PAIN: ICD-10-CM

## 2023-09-18 PROCEDURE — 99213 OFFICE O/P EST LOW 20 MIN: CPT

## 2023-09-18 RX ORDER — INSULIN LISPRO 100 [IU]/ML
4-16 INJECTION, SOLUTION INTRAVENOUS; SUBCUTANEOUS
Qty: 10 ML | Refills: 6 | Status: SHIPPED | OUTPATIENT
Start: 2023-09-18

## 2023-09-18 RX ORDER — DULOXETIN HYDROCHLORIDE 30 MG/1
60 CAPSULE, DELAYED RELEASE ORAL DAILY
Qty: 60 CAPSULE | Refills: 1 | Status: SHIPPED | OUTPATIENT
Start: 2023-09-18

## 2023-09-18 RX ORDER — TOPIRAMATE 25 MG/1
25 TABLET ORAL 2 TIMES DAILY
Qty: 60 TABLET | Refills: 1 | Status: SHIPPED | OUTPATIENT
Start: 2023-09-18

## 2023-09-18 RX ORDER — PRAZOSIN HYDROCHLORIDE 1 MG/1
1 CAPSULE ORAL
Qty: 90 CAPSULE | Refills: 3 | Status: SHIPPED | OUTPATIENT
Start: 2023-09-18

## 2023-09-18 RX ORDER — INSULIN GLARGINE 100 [IU]/ML
30 INJECTION, SOLUTION SUBCUTANEOUS EVERY 12 HOURS SCHEDULED
Qty: 10 ML | Refills: 6 | Status: SHIPPED | OUTPATIENT
Start: 2023-09-18

## 2023-09-18 RX ORDER — HYDROXYZINE 50 MG/1
50 TABLET, FILM COATED ORAL
Qty: 30 TABLET | Refills: 0 | Status: SHIPPED | OUTPATIENT
Start: 2023-09-18

## 2023-09-18 RX ORDER — BLOOD SUGAR DIAGNOSTIC
1 STRIP MISCELLANEOUS DAILY PRN
Qty: 200 EACH | Refills: 2 | Status: SHIPPED | OUTPATIENT
Start: 2023-09-18

## 2023-09-18 RX ORDER — NICOTINE 21 MG/24HR
1 PATCH, TRANSDERMAL 24 HOURS TRANSDERMAL DAILY
Qty: 28 PATCH | Refills: 3 | Status: SHIPPED | OUTPATIENT
Start: 2023-09-18

## 2023-09-18 RX ORDER — LANCETS 30 GAUGE
EACH MISCELLANEOUS 3 TIMES DAILY
Qty: 100 EACH | Refills: 0 | Status: SHIPPED | OUTPATIENT
Start: 2023-09-18

## 2023-09-18 NOTE — ASSESSMENT & PLAN NOTE
Lab Results   Component Value Date    HGBA1C 9.9 (H) 08/25/2023     Current regimen is metformin 8471WM BID, trulicity 8.3UJ weekly, insulin glargine 30units BID, and insulin lispro 16units with meals. Reports sugars are usually in 150s, can get as high as 400s before bed, then drops to  after nighttime dose of insulin glargine. Reports issues affording long acting insulin. Provided a coupon for medication but pt expressed even with coupon she would not be able to afford the medicine. - Referral to complex care management  - Changed glargine from [facility] to [facility]. Pt advised to reach out to office if she cannot afford medication and a different option will be considered.  Consider [facility] DR SUYAPA SHANNON or 30/70 insulin formulation.  - Advised checking blood sugar before meals prior to dosing mealtime insulin

## 2023-09-18 NOTE — PROGRESS NOTES
Name: Hortensia Bautista      : 1985      MRN: [id number]  Encounter Provider: Pati Caldwell MD  Encounter Date: 2023   Encounter department: [facility]     1. Type 2 diabetes mellitus with diabetic polyneuropathy, with long-term current use of insulin ([facility])  Assessment & Plan:    Lab Results   Component Value Date    HGBA1C 9.9 (H) 2023     Current regimen is metformin 7627SU BID, trulicity 9.7UX weekly, insulin glargine 30units BID, and insulin lispro 16units with meals. Reports sugars are usually in 150s, can get as high as 400s before bed, then drops to  after nighttime dose of insulin glargine. Reports issues affording long acting insulin. Provided a coupon for medication but pt expressed even with coupon she would not be able to afford the medicine. - Referral to complex care management  - Changed glargine from [facility] to [facility]. Pt advised to reach out to office if she cannot afford medication and a different option will be considered. Consider [facility] DR SUYAPA SHANNON or 30/70 insulin formulation.  - Advised checking blood sugar before meals prior to dosing mealtime insulin    Orders:  -     Global Inject Ease Lancets 30G MISC; Use 3 (three) times a day  -     OneTouch Ultra test strip; Use 1 each daily as needed (before meals) Use as instructed  -     insulin lispro (HumaLOG) 100 units/mL injection; Inject 4-16 Units under the skin 3 (three) times a day before meals  -     insulin glargine (Semglee) 100 units/mL subcutaneous injection; Inject 30 Units under the skin every 12 (twelve) hours  -     Ambulatory Referral to Complex Care Management Program; Future    2. Bipolar disorder, in partial remission, most recent episode depressed ([address])  Assessment & Plan:  Pt expressing depression symptoms are not controlled. She has not seen her psychiatrist, Dr Kat Staton, in a long time and has stopped her antipsychotic medication.  Pt expresses SI as recently as 1 month ago; denies current SI.  - Increased duloxetine to 60mg QD. Follow up in 1 month with plan to increase again.  - Encouraged following up with psychiatrist. Gabriela Fay not waiting for  to schedule therapy appointment and seeking out a therapist who accepts pt's insurance  - Will reach out to Dr Ivory Anaya about pt's discontinuation of seroquel and current depressive symptoms  - Reviewed safety plan in case of recurrent SI. Pt agreeable to seeking emergent medical help in this case; pt's wife is present in room and is agreeable to this plan as well. - Wrote a letter for pt to seek better living conditions, as she reports a large part of her depression is because she feels stuck in her motel room. Encouraged getting out of bed and going outside when smoking to improve daily activity    Orders:  -     DULoxetine (CYMBALTA) 30 mg delayed release capsule; Take 2 capsules (60 mg total) by mouth daily  -     hydrOXYzine HCL (ATARAX) 50 mg tablet; Take 1 tablet (50 mg total) by mouth daily at bedtime as needed for itching or anxiety  -     topiramate (TOPAMAX) 25 mg tablet; Take 1 tablet (25 mg total) by mouth 2 (two) times a day    3. Bilateral leg pain  Assessment & Plan:  Leg pain today largely in L thigh stump secondary misfitting prosthetic. Pt needs to have her prosthetic for 6 months before she can get a newly fitted one. She does not use the prosthetic at home largely because she does not get out of her bed. Reports gabapentin was not helping with pain. Uses walker when ambulating.  - Advised following up with surgeon about prosthetic fit  - Increasing duloxetine dose this visit with close follow up to consider increasing again      4. Postoperative pain    5.  Insulin-dependent diabetes mellitus with neuropathy  Assessment & Plan:    Lab Results   Component Value Date    HGBA1C 9.9 (H) 08/25/2023     Current regimen is metformin 9944ML BID, trulicity 4.6BO weekly, insulin glargine 30units BID, and insulin lispro 16units with meals. Reports sugars are usually in 150s, can get as high as 400s before bed, then drops to  after nighttime dose of insulin glargine. Reports issues affording long acting insulin. Provided a coupon for medication but pt expressed even with coupon she would not be able to afford the medicine. - Referral to complex care management  - Changed glargine from [facility] to [facility]. Pt advised to reach out to office if she cannot afford medication and a different option will be considered. Consider [address] [place] [address] or 30/70 insulin formulation.  - Advised checking blood sugar before meals prior to dosing mealtime insulin    Orders:  -     Global Inject Ease Lancets 30G MISC; Use 3 (three) times a day  -     OneTouch Ultra test strip; Use 1 each daily as needed (before meals) Use as instructed  -     insulin lispro (HumaLOG) 100 units/mL injection; Inject 4-16 Units under the skin 3 (three) times a day before meals  -     insulin glargine (Semglee) 100 units/mL subcutaneous injection; Inject 30 Units under the skin every 12 (twelve) hours  -     Ambulatory Referral to Complex Care Management Program; Future    6. Hypertension, unspecified type  -     Blood Pressure Monitoring (Blood Pressure Monitor Automat) KATLYN; Use Daily as Directed    7. Diabetes ([address])  -     metFORMIN (GLUCOPHAGE) 1000 MG tablet; Take 1 tablet (1,000 mg total) by mouth 2 (two) times a day with meals    8. Tobacco consumption  -     nicotine (NICODERM CQ) 21 mg/24 hr TD 24 hr patch; Place 1 patch on the skin over 24 hours daily    9. Nicotine dependence, uncomplicated, unspecified nicotine product type  Assessment & Plan:  Smoking about 1/2ppd. Has not been given nicotine patch the last few times she went to the pharmacy.  Pt does not want to quit smoking at this time.  - Resent nicotine patch    Orders:  -     nicotine (NICODERM CQ) 21 mg/24 hr TD 24 hr patch; Place 1 patch on the skin over 24 hours daily    10. Hypertension  -     prazosin (MINIPRESS) 1 mg capsule; Take 1 capsule (1 mg total) by mouth daily at bedtime         Subjective      HPI   Pain in leg from prosthetic. Hasnt been able to use prosthetic. Lost some weight as well as some thickness in L thigh which allows the prosthetic to push further up leg than it was originally fitted to do. Has been told she will need to wait 6 months before     Needs a letter for  to ask for 1st floor appt. Currently living in a small motel room. Expresses that mood and living situation has caused excessive sleep and depression. Does not engage with home health or PT due to this depression. Has thought about taking own life with insulin in the past. Denies recent SI. Follows Dr Aline Londono for psychiatry and has not seen him in a while; stopped taking seroquel due to weight gain and perceived ineffectiveness. Sugars typically 155 during the day. At night she notes higher sugars, up to 400, then takes insulin and sugars dip to around . On long acting insulin 30units BID and short acting before meals 16units. Insurance changed, needs a new insulin since Lantus in not covered anymore. No longer taking robaxin, dilaudid. Ran out of topiramate. Would like to discuss increasing dose at next visit. Denies HA since her ED visit. Reports she completed abx course for perineal abscess and it is resolved. Has not yet scheduled appointment with sleep medicine because they have not yet called her. Review of Systems   Constitutional: Negative for chills and fever. HENT: Negative for ear pain and sore throat. Eyes: Negative for pain and visual disturbance. Respiratory: Negative for cough and shortness of breath. Cardiovascular: Negative for chest pain and palpitations. Gastrointestinal: Negative for abdominal pain and vomiting. Genitourinary: Negative for dysuria and hematuria. Musculoskeletal: Negative for arthralgias and back pain. L leg pain from prosthetic   Skin: Negative for color change and rash. Neurological: Negative for seizures and syncope. Psychiatric/Behavioral: Positive for dysphoric mood and suicidal ideas. All other systems reviewed and are negative. Current Outpatient Medications on File Prior to Visit   Medication Sig   • Advair -21 MCG/ACT inhaler Inhale 2 puffs 2 (two) times a day Rinse mouth after use   • albuterol (PROVENTIL HFA,VENTOLIN HFA) 90 mcg/act inhaler Inhale 2 puffs every 4 (four) hours as needed for wheezing or shortness of breath   • aspirin 81 mg chewable tablet Chew 1 tablet (81 mg total) daily   • atorvastatin (LIPITOR) 40 mg tablet Take 1 tablet (40 mg total) by mouth daily with dinner   • lisinopril (ZESTRIL) 20 mg tablet Take 1 tablet (20 mg total) by mouth daily   • Trulicity 1.5 MARSHALL/4.6VJ injection AS DIRECTED WEEKLY   • Xarelto 20 MG tablet TAKE 1 TABLET (20 MG TOTAL) BY MOUTH DAILY WITH BREAKFAST   • [DISCONTINUED] DULoxetine (CYMBALTA) 20 mg capsule Take 2 capsules (40 mg total) by mouth daily   • [DISCONTINUED] HYDROmorphone (DILAUDID) 2 mg tablet Take 1 (2mg) to 2 tabs (4mg) by mouth every 4 hours as needed for moderate to severe pain   • [DISCONTINUED] hydrOXYzine HCL (ATARAX) 50 mg tablet Take 1 tablet (50 mg total) by mouth every 8 (eight) hours as needed for itching or anxiety   • [DISCONTINUED] insulin glargine (LANTUS) 100 units/mL subcutaneous injection Inject 45 Units under the skin daily at bedtime   • [DISCONTINUED] metFORMIN (GLUCOPHAGE) 1000 MG tablet TAKE ONE (1) TABLET BY MOUTH TWICE DAILY WITH FOOD   • [DISCONTINUED] prazosin (MINIPRESS) 1 mg capsule Take 1 capsule (1 mg total) by mouth daily at bedtime   • [DISCONTINUED] topiramate (TOPAMAX) 25 mg tablet Take 1 tablet (25 mg total) by mouth 2 (two) times a day   • Alcohol Swabs (Pharmacist Choice Alcohol) PADS USE 3-4 TIMES AS DIRECTED.    • Comfort EZ Pen Needles 33G X 4 MM MISC USE AS DIRECTED   • glycerin-hypromellose- (ARTIFICIAL TEARS) 0.2-0.2-1 % SOLN Administer 2 drops to both eyes every 6 (six) hours as needed (for eye discomfort) (Patient not taking: Reported on 9/18/2023)   • Insulin Pen Needle (Comfort EZ Pen Needles) 32G X 4 MM MISC Inject under the skin 4 (four) times a day (before meals and at bedtime)   • ketotifen (ZADITOR) 0.025 % ophthalmic solution Administer 1 drop to both eyes 2 (two) times a day as needed (for eye discomfort) (Patient not taking: Reported on 9/18/2023)   • [DISCONTINUED] Blood Pressure Monitoring (Blood Pressure Monitor Automat) KATLYN Use Daily as Directed   • [DISCONTINUED] gabapentin (NEURONTIN) 300 mg capsule Take 1 capsule (300 mg total) by mouth 3 (three) times a day (Patient not taking: Reported on 9/18/2023)   • [DISCONTINUED] Global Inject Ease Lancets 30G MISC USE 3 (THREE) TIMES A DAY   • [DISCONTINUED] insulin lispro (HumaLOG) 100 units/mL injection Inject 2-12 Units under the skin 3 (three) times a day before meals (Patient not taking: Reported on 8/16/2023)   • [DISCONTINUED] insulin lispro (HumaLOG) 100 units/mL injection Inject 20 Units under the skin daily with breakfast Do not start before April 20, 2023.  (Patient not taking: Reported on 8/24/2023)   • [DISCONTINUED] methocarbamol (ROBAXIN) 750 mg tablet Take 1 tablet (750 mg total) by mouth every 6 (six) hours as needed for muscle spasms (Patient not taking: Reported on 8/16/2023)   • [DISCONTINUED] nicotine (NICODERM CQ) 21 mg/24 hr TD 24 hr patch Place 1 patch on the skin over 24 hours daily (Patient not taking: Reported on 8/16/2023)   • [DISCONTINUED] OneTouch Ultra test strip TEST TWICE DAILY ()   • [DISCONTINUED] polyethylene glycol (MIRALAX) 17 g packet Take 17 g by mouth daily (Patient not taking: Reported on 8/16/2023)   • [DISCONTINUED] QUEtiapine (SEROquel) 200 mg tablet Take by mouth daily at bedtime (Patient not taking: Reported on 8/24/2023)   • [DISCONTINUED] simethicone (MYLICON) 80 mg chewable tablet Chew 1 tablet (80 mg total) every 6 (six) hours as needed for flatulence (Patient not taking: Reported on 8/16/2023)       Objective     /90 (BP Location: Left arm, Patient Position: Sitting, Cuff Size: Large)   Pulse (!) 115   Temp 98.6 °F (37 °C) (Tympanic)   Resp 18   Ht 5' 1" (1.549 m)   Wt 130 kg (287 lb)   LMP 08/01/2023 (Within Days)   SpO2 98%   BMI 54.23 kg/m²     Physical Exam  Constitutional:       General: She is not in acute distress. Appearance: She is not ill-appearing. HENT:      Head: Normocephalic and atraumatic. Mouth/Throat:      Mouth: Mucous membranes are moist.      Pharynx: Oropharynx is clear. Eyes:      General:         Right eye: No discharge. Left eye: No discharge. Conjunctiva/sclera: Conjunctivae normal.   Pulmonary:      Effort: Pulmonary effort is normal. No respiratory distress. Abdominal:      General: There is no distension. Musculoskeletal:         General: No swelling, tenderness or deformity. Cervical back: Neck supple. Comments: L AKA with prosthetic riding high nearly to medial base of thigh. No skin changes beneath prosthetic to suggest chronic skin irritation or infection   Skin:     General: Skin is warm and dry. Coloration: Skin is not jaundiced. Neurological:      Mental Status: She is alert and oriented to person, place, and time. Psychiatric:         Mood and Affect: Mood is depressed. Affect is tearful.          Speech: Speech normal.         Behavior: Behavior normal.       Radha Melendrez MD

## 2023-09-18 NOTE — ASSESSMENT & PLAN NOTE
Smoking about 1/2ppd. Has not been given nicotine patch the last few times she went to the pharmacy.  Pt does not want to quit smoking at this time.  - Resent nicotine patch

## 2023-09-18 NOTE — LETTER
September 18, 2023     Patient: Yaya Butcher  YOB: 1985  Date of Visit: 9/18/2023      To Whom it May Concern:    Patti Perez is under my professional care. Sandor Teran was seen in my office on 9/18/2023. She has difficulty and pain ambulating secondary an above knee amputation and would benefit from a first floor apartment due to this medical problem. If you have any questions please call. If you have any questions or concerns, please don't hesitate to call.          Sincerely,          Naeem Li MD        CC: No Recipients

## 2023-09-18 NOTE — ASSESSMENT & PLAN NOTE
Leg pain today largely in L thigh stump secondary misfitting prosthetic. Pt needs to have her prosthetic for 6 months before she can get a newly fitted one. She does not use the prosthetic at home largely because she does not get out of her bed. Reports gabapentin was not helping with pain.  Uses walker when ambulating.  - Advised following up with surgeon about prosthetic fit  - Increasing duloxetine dose this visit with close follow up to consider increasing again

## 2023-09-18 NOTE — ASSESSMENT & PLAN NOTE
Pt expressing depression symptoms are not controlled. She has not seen her psychiatrist, Dr Shruthi Mason, in a long time and has stopped her antipsychotic medication. Pt expresses SI as recently as 1 month ago; denies current SI.  - Increased duloxetine to 60mg QD. Follow up in 1 month with plan to increase again.  - Encouraged following up with psychiatrist. Rivera Lambert not waiting for  to schedule therapy appointment and seeking out a therapist who accepts pt's insurance  - Will reach out to Dr Shruthi Mason about pt's discontinuation of seroquel and current depressive symptoms  - Reviewed safety plan in case of recurrent SI. Pt agreeable to seeking emergent medical help in this case; pt's wife is present in room and is agreeable to this plan as well. - Wrote a letter for pt to seek better living conditions, as she reports a large part of her depression is because she feels stuck in her motel room.  Encouraged getting out of bed and going outside when smoking to improve daily activity

## 2023-09-19 DIAGNOSIS — I73.9 PAD (PERIPHERAL ARTERY DISEASE) (HCC): ICD-10-CM

## 2023-09-19 RX ORDER — RIVAROXABAN 20 MG/1
TABLET, FILM COATED ORAL
Qty: 30 TABLET | Refills: 0 | Status: SHIPPED | OUTPATIENT
Start: 2023-09-19

## 2023-09-22 ENCOUNTER — TELEPHONE (OUTPATIENT)
Dept: FAMILY MEDICINE CLINIC | Facility: CLINIC | Age: 38
End: 2023-09-22

## 2023-09-22 NOTE — TELEPHONE ENCOUNTER
Spoke with Patient she states she fired them today and She does not want any more skilled nursing support

## 2023-09-22 NOTE — TELEPHONE ENCOUNTER
Please reach out to [facility] and be sure she does not want skilled nursing support in the home. She would benefit from it and I do not want to sign an order for her discharge if it is based on one refusal on a bad day.

## 2023-09-22 NOTE — TELEPHONE ENCOUNTER
Encounter for forms    Please refer to the following information:       Type of Form:  [facility] Thorne    Date of Visit (if applicable): 2/10/2828    Doctor: Marcia Shaw     Fax number: [phone]          Original in Dr. Marcia Shaw folder to be completed.

## 2023-09-25 NOTE — TELEPHONE ENCOUNTER
Form signed and placed up front Pt. c/o "inflamed wisdom tooth" on left upper side, seen here last week, prescribed codeine and penicillin c/o persistent pain to area. Reports taking 400 mg of motrin around 8 pm. Denies fever, chills.

## 2023-09-26 ENCOUNTER — TELEPHONE (OUTPATIENT)
Dept: FAMILY MEDICINE CLINIC | Facility: CLINIC | Age: 38
End: 2023-09-26

## 2023-09-26 NOTE — TELEPHONE ENCOUNTER
Encounter for forms      Patient requires a form to be completed.  Patient is aware of 7-10 day turn around time.    Please refer to the following information:       Type of Form: [facility] care     Date of Visit (if applicable):     Doctor: dr. Johnson     Expected date:     How patient would like to receive form: faxed     Fax number: [phone]    Patient phone number:       Copy scanned to encounter. Copy provided to patient. Original in (X) team folder to be completed.

## 2023-10-04 ENCOUNTER — PATIENT OUTREACH (OUTPATIENT)
Dept: FAMILY MEDICINE CLINIC | Facility: CLINIC | Age: 38
End: 2023-10-04

## 2023-10-04 ENCOUNTER — EVALUATION (OUTPATIENT)
Dept: PHYSICAL THERAPY | Facility: CLINIC | Age: 38
End: 2023-10-04
Payer: COMMERCIAL

## 2023-10-04 DIAGNOSIS — Z59.9 INADEQUATE COMMUNITY RESOURCES: ICD-10-CM

## 2023-10-04 DIAGNOSIS — Z89.612 S/P AKA (ABOVE KNEE AMPUTATION) UNILATERAL, LEFT (HCC): ICD-10-CM

## 2023-10-04 DIAGNOSIS — Z59.819 HOUSING INSECURITY: Primary | ICD-10-CM

## 2023-10-04 PROCEDURE — 97530 THERAPEUTIC ACTIVITIES: CPT | Performed by: PHYSICAL THERAPIST

## 2023-10-04 PROCEDURE — 97162 PT EVAL MOD COMPLEX 30 MIN: CPT | Performed by: PHYSICAL THERAPIST

## 2023-10-04 SDOH — ECONOMIC STABILITY - HOUSING INSECURITY: HOUSING INSTABILITY UNSPECIFIED: Z59.819

## 2023-10-04 SDOH — ECONOMIC STABILITY - INCOME SECURITY: PROBLEM RELATED TO HOUSING AND ECONOMIC CIRCUMSTANCES, UNSPECIFIED: Z59.9

## 2023-10-04 NOTE — PROGRESS NOTES
CCM referral received from PCP regarding Insulin needs. Chart was reviewed and this RNCM called patient and introduced self and explained the role of the [facility] and CCM services. Patient says she is feeling well. She says she has all of her medications and takes them as directed. She says she had a hard time getting the Insulin Pen-Novolog because her insurance did not cover it but she says it covers the vial and she is fine with this. She has no problems with drawing it up and administering. Patient declines reviewing medications. Patient declines CCM services. Patient states she has all Diabetes supplies and she checks her BS daily. Patient says her BS was 46 this am. She says she feels shaky when her BS is low. She says she ate breakfast and felt better. She says she did not recheck her BS again because she had to leave for a doctors apt. Patient says she knows what to do when her BS is low and if she feels bad. We reviewed hypoglycemia and how to treat. She states understanding and says she is not interested in education or review. Patient needs help with housing and behavioral health follow up. Patient says her housing situation is really bad. She resides at a motel and cannot afford to pay for it at this time. We discussed getting SW involved and she agrees to a referral to [facility]. Patient gets transportation to her doctors apt's via Ly[employer] that the doctors offices arrange. Order/referral placed for Ambulatory SWCM for housing and mental health resources/follow up. Note routed to PCP.

## 2023-10-04 NOTE — PROGRESS NOTES
PT Evaluation     Today's date: 10/4/2023  Patient name: Giovanny Quinteros  : 1985  MRN: [id number]  Referring provider: EMILE Zurita  Dx:   Encounter Diagnosis     ICD-10-CM    1. S/P AKA (above knee amputation) unilateral, left [facility])  D26.618 Ambulatory Referral to Physical Therapy          Start Time: 845  Stop Time: 945  Total time in clinic (min): 60 minutes    Assessment  Assessment details: Patient is a 40 y.o. female s/p L TFA presenting to PT with her prepatory prosthesis for prosthetic training. Significant PMHx including PAD, HTN, DM, diabetic neuropathy, bipolar, and depression. Limited examination completed today due to patient unable to doff her prosthesis due to her clothes and also patient's request to assist with stairs negotiation so patient may be able to move out of her hotel and into an apartment later this week. On exam patient presents with impaired sound limb protective sensation, impaired gait, decreased endurance and LE strength. Patient will benefit from skilled PT to address the above impairments to improve her independence with functional mobility with her prosthesis. Impairments: abnormal gait, abnormal or restricted ROM, activity intolerance, impaired balance, impaired physical strength, lacks appropriate home exercise program, pain with function and poor posture     Symptom irritability: moderateUnderstanding of Dx/Px/POC: excellent   Prognosis: good    Goals  Within 8 weeks,   1. Patient will be independent with sock ply management. 2. Patient will be able to independently transfer from sit to stand with prosthesis. 3. Patient will be able to stand for >= 15 minutes with prosthesis. 4. Patient will be able to ambulate 250ft mod-I with RW with prosthesis. 5. Patient will be independent with stairs negotiation with 1 railing. 5. Patient will improve 2MWT by 112.5ft (IE: TBDft)  6. Patient will improve TUG 2s (IE: TBDs).         Within 16 weeks or by discharge,   1. Patient will be able to ambulate for >=20 minutes without rest.   2. Patient will be able to stand for >= 20 minutes with prosthesis to participate in household tasks. 3. Patient will improve 2MWT by 150ft (IE: TBDft)  4. Patient will improve TUG 4s (IE: TBDs). 5. Patient will improve his gait speed by 0.2 m/s (IE: TBD m/s)  6. Patient will improve PLUS-M by 5.32 pts (IE: 34.1)  7. Patient will improve ABC by 20% (IE: 30%)    Plan  Patient would benefit from: skilled physical therapy  Planned modality interventions: cryotherapy and thermotherapy: hydrocollator packs  Planned therapy interventions: abdominal trunk stabilization, IADL retraining, joint mobilization, manual therapy, activity modification, balance, motor coordination training, balance/weight bearing training, prosthetic fitting/training, postural training, neuromuscular re-education, therapeutic training, therapeutic exercise, therapeutic activities, strengthening, stretching, transfer training, gait training and home exercise program  Frequency: 2x week  Duration in weeks: 16  Plan of Care beginning date: 10/4/2023  Plan of Care expiration date: 1/24/2024  Treatment plan discussed with: patient and referring physician        Subjective Evaluation    History of Present Illness  Date of surgery: 3/29/2023  Mechanism of injury: surgery  Mechanism of injury: Patient is s/p L TFA 2/2 PAD with left fem-pop bypass occluded. She was in the hospital for 1 month. Went to rehab to [facility] for rehab for 3 months until July. She was discharged home to a hotel where she is currently living with her wife Carolina Carpenter. Had home PT for a little bit, they walked with her twice and she walked 50ft. Reports the hotel is very small and unable to walk around with her walker. She is hoping to move into a bigger apartment (not ADA) later this week. Comorbidities? PAD, HTN, COPD, drug abuse, bipolar, depression     Contralateral side?  Denies any wounds, does not do foot checks    Prothestist? Thad at [address]: K1 manual lock knee    Suspension: lanyard    Liners: unable to see    Wear time? Does not wear at home    Sock ply management? Currently wearing 3 sock ply, does not understand sock ply management    Activity level: currently sedentary or in the bed, needs to use bed pan due to limitations from the hotel room size  Patient Goals  Patient goals for therapy: independence with ADLs/IADLs, increased strength, improved balance and decreased pain  Patient goal: to improve independence with walking with prosthesis  Pain  Pain scale: reports will have residual limb and phantom pain, especially at night. Social Support  Lives with: spouse    Employment status: not working  Treatments  Previous treatment: physical therapy        Objective     Ambulation   Weight-Bearing Status   Assistive device used: wheeled walker  Neuro Exam:     Amputee   Level of amputation: left  and transfemoral     Residual skin inspection   Normal skin integrity: unable to assess due to patient's tight pants.   Contralateral limb inspection   Skin integrity: normal   Edema: no  Wounds: none  Sensation to light touch: intact  Gait comment: Short step lengths, shorter on R, slow juanita, quickly fatigues  L prosthesis in hip ER, manual lock knee that maintains in extension during ambulation         Outcome Measure 10/4    ABC 30%    PLUS-M 34.1    AMPRO     L-Test     2MWT     Gait speed     5x STS          Precautions: L TFA, bipolar, depression, HTN, PAD       10/4          Vitals  **  Outcome measures  LLE assessment         Patient Ed           Daily foot checks Educated          Sock ply management Educated                                                                 Neuro Re-Ed           Sit to stands Cuing for unlocking knee to sit          GT RW w/ wheelchair follow, supervision assist  20ft x 3  100ft x 1  Cuing for longer RLE step          Stairs negotiation 2 railings 4" steps  X 3  1 railing sideways 4"  X 3   Supervision to min A                                                                 Ther Ex           Aerobic conditioning           Sit to stands Educated                                                                            Ther Activity                                 Manual                                                                  Modalities

## 2023-10-05 DIAGNOSIS — F31.75 BIPOLAR DISORDER, IN PARTIAL REMISSION, MOST RECENT EPISODE DEPRESSED (HCC): ICD-10-CM

## 2023-10-06 RX ORDER — TOPIRAMATE 25 MG/1
25 TABLET ORAL 2 TIMES DAILY
Qty: 60 TABLET | Refills: 1 | Status: SHIPPED | OUTPATIENT
Start: 2023-10-06

## 2023-10-06 RX ORDER — DULOXETIN HYDROCHLORIDE 30 MG/1
60 CAPSULE, DELAYED RELEASE ORAL DAILY
Qty: 60 CAPSULE | Refills: 1 | Status: SHIPPED | OUTPATIENT
Start: 2023-10-06

## 2023-10-10 DIAGNOSIS — E11.9 DIABETES (HCC): ICD-10-CM

## 2023-10-11 ENCOUNTER — TELEPHONE (OUTPATIENT)
Dept: NEUROLOGY | Facility: CLINIC | Age: 38
End: 2023-10-11

## 2023-10-11 DIAGNOSIS — E11.65 UNCONTROLLED TYPE 2 DIABETES MELLITUS WITH HYPERGLYCEMIA (HCC): ICD-10-CM

## 2023-10-11 DIAGNOSIS — Z79.4 TYPE 2 DIABETES MELLITUS WITH DIABETIC POLYNEUROPATHY, WITH LONG-TERM CURRENT USE OF INSULIN (HCC): ICD-10-CM

## 2023-10-11 DIAGNOSIS — E11.42 TYPE 2 DIABETES MELLITUS WITH DIABETIC POLYNEUROPATHY, WITH LONG-TERM CURRENT USE OF INSULIN (HCC): ICD-10-CM

## 2023-10-11 NOTE — TELEPHONE ENCOUNTER
1ST ATTEMPT,     Called pt no answer, LMOM. "[employer] MESSAGE SENT, LAST LOG IN WAS ON 9/19/23    LETTER SENT. Thank you,     Ester Green             U/ MARCIA DONALD/ [address]- 8/28/23    Yaya Butcher will need follow up in 4-6 weeks with headache attending/resident or AP. She will not require outpatient neurological testing.

## 2023-10-12 ENCOUNTER — PATIENT OUTREACH (OUTPATIENT)
Dept: FAMILY MEDICINE CLINIC | Facility: CLINIC | Age: 38
End: 2023-10-12

## 2023-10-12 RX ORDER — LANCETS 30 GAUGE
EACH MISCELLANEOUS 3 TIMES DAILY
Qty: 100 EACH | Refills: 0 | Status: SHIPPED | OUTPATIENT
Start: 2023-10-12

## 2023-10-12 RX ORDER — LANCETS 30 GAUGE
EACH MISCELLANEOUS
Qty: 1 KIT | Refills: 2 | Status: SHIPPED | OUTPATIENT
Start: 2023-10-12

## 2023-10-12 NOTE — PROGRESS NOTES
Programs sent to pt via [employer]:  Emergency Support by [employer] by The [address] by [facility]   Transitional Housing Program by [address] Transitional Housing by [employer] Action [address] by 800 [address] by [address] [facility] JULIETA did some online searches. Located 4 potential rental properties. Contacted patient to provide her with the details of these properties. She requested info be sent via email. JORGE RENDON sent patient an email with the information.

## 2023-10-12 NOTE — TELEPHONE ENCOUNTER
Will refill in case Sandor Teran is still using these supplies. If she is using an alternative she does not need to pick these up.

## 2023-10-12 NOTE — PROGRESS NOTES
JORGE RENDON received a referral from RN CM regarding patient's need for social work follow up due to housing concerns. JORGE RENDON spoke with patient at this time who states she has been living in a hotel for a year now. Patient resides with her wife. They both receive Social Security (patient receives about $1,048 and her spouse receives $914 per month). They also receive SNAP. They have been paying $1,600/month at the hotel and state they are unable to afford staying there any longer. Patient states she is only able to stay until the 28th and does not have a plan on where to go after that. Patient states she has a  through [facility] who is able to help her get security deposit, however, they first need to find a place to rent. Patient reports running into several barriers while trying to find a place to rent. Patient is an amputee and states she needs a handicap accessible place and it needs to be on the first floor as she is unable to utilize steps. Patient is currently in PT. Patient also disclosed that she was arrested last year and both she and her wife received charges. This has caused a barrier as many landlords have been unwilling to work with them approve them due to the charges and history. Patient states she has been looking for places for about a year now with no success. JORGE RENDON inquired if patient has attempted to contact [employer] regarding possible resources. Patient states she has contacted [employer], however, she states she is unable to go into a shelter due to being diabetic and getting UTIs. Patient is unable to share a community bathroom. Furthermore, patient states she has a lot of personal items that she wouldn't be able to take with her into a shelter and she does not want to lose her property.      Patient states she is on the wait list for [employer] and is number 28 on the list. She reports having contacted [employer] to see if they are able to push her up on the wait list.    JORGE RENDON agreed to discuss with CMOC/colleagues to see if she if they are aware of any landlords in [facility] that may be willing to work with patient. Patient expressed gratitude.

## 2023-10-17 DIAGNOSIS — I73.9 PAD (PERIPHERAL ARTERY DISEASE) (HCC): ICD-10-CM

## 2023-10-17 DIAGNOSIS — Z79.4 TYPE 2 DIABETES MELLITUS WITH DIABETIC POLYNEUROPATHY, WITH LONG-TERM CURRENT USE OF INSULIN (HCC): ICD-10-CM

## 2023-10-17 DIAGNOSIS — E11.42 TYPE 2 DIABETES MELLITUS WITH DIABETIC POLYNEUROPATHY, WITH LONG-TERM CURRENT USE OF INSULIN (HCC): ICD-10-CM

## 2023-10-17 RX ORDER — RIVAROXABAN 20 MG/1
TABLET, FILM COATED ORAL
Qty: 30 TABLET | Refills: 0 | Status: SHIPPED | OUTPATIENT
Start: 2023-10-17

## 2023-10-17 RX ORDER — DULAGLUTIDE 1.5 MG/.5ML
INJECTION, SOLUTION SUBCUTANEOUS
Qty: 2 ML | Refills: 1 | Status: SHIPPED | OUTPATIENT
Start: 2023-10-17

## 2023-10-18 ENCOUNTER — OFFICE VISIT (OUTPATIENT)
Dept: PHYSICAL THERAPY | Facility: CLINIC | Age: 38
End: 2023-10-18
Payer: COMMERCIAL

## 2023-10-18 DIAGNOSIS — Z89.612 S/P AKA (ABOVE KNEE AMPUTATION) UNILATERAL, LEFT (HCC): Primary | ICD-10-CM

## 2023-10-18 PROCEDURE — 97750 PHYSICAL PERFORMANCE TEST: CPT | Performed by: PHYSICAL THERAPIST

## 2023-10-18 PROCEDURE — 97530 THERAPEUTIC ACTIVITIES: CPT | Performed by: PHYSICAL THERAPIST

## 2023-10-18 NOTE — PROGRESS NOTES
Daily Note     Today's date: 10/18/2023  Patient name: Romel Reid  : 1985  MRN: [id number]  Referring provider: ADAM Santos  Dx:   Encounter Diagnosis     ICD-10-CM    1. S/P AKA (above knee amputation) unilateral, left ([address])  F91.222           Start Time: 08  Stop Time: 920  Total time in clinic (min): 60 minutes    Insurance: Payor: [employer]  Auth needed Yes   Number of visits authorized: 32  Visit count: 2 of 32;   PN due 24  POC Expiration: 23  Auth Date Ranges: 10/4/23 - 24      Subjective: The apartment they looked at wasn't fit for them. It was on a hill and the stairs were too high. They need to get out of the hotel soon because they can only afford 3 more weeks. Objective: See treatment diary below  LLE assessment - patient declining removing gel liner as it is difficult to don  Patient with increased space at the distal end of the gel liner - possible fit or donning issue  Patient is not low enough in her prosthesis to weight bearing on ischial tuberosity      Assessment: Continued evaluation with assessing prosthetic fit and outcome measures testing. Patient with poor fit in her prosthesis for multiple reasons. Patient has increased space at the distal end of her gel liner which may be due to a donning issue and/or gel liner is not the right size for her. Patient's ischium is also not contained in her socket which may be due to donning issue of not pulling lanyard all the way down. Patient is hindered with donning her prosthesis properly 2/2 her body habitus, deconditioning, and wearing long pants that limits access to her prosthesis. Communicated with patient's prosthetist Alejandrina Macdonald, [facility] from [address] [place] [address] who will attend a PT session to assist with these fit issues.  Regarding patient's outcome measures testing, patient is scoring in the K1 level and is most limited with her balance, decreased confidence in weight bearing on her prosthesis, and deconditioning. Patient demonstrated fatigue post treatment, exhibited good technique with therapeutic exercises, and would benefit from continued PT to improve her independence with functional mobility with her prosthesis. Plan: Continue per plan of care.       Precautions: L TFA, bipolar, depression, HTN, PAD    Outcome Measure 10/4 10/18   ABC 30%    PLUS-M 34.1    AMPRO  21/36 K1   L-Test     2MWT  50ft with RW   Gait speed  0.30 m/s with RW   5x STS  26s without hands but does not use her prosthetic leg       Flowsheet Rows      Flowsheet Row Most Recent Value   AMPPRO     Left limb TF   Use of prosthesis AMPPRO   Sitting balance 1   Sitting reach 2   Chair to chair transfer 2   Arises from chair 1   Attempts to arise from chair 1   Immediate standing balance 1   Standing Balance (30s) 1   Single limb standing balance - non-prosthetic side 1   Single limb standing balance - prosthetic side 0   Standing reach 0   Nudge test 1   Eyes closed 1    objects off the floor 1   Sitting down 1   Initiation of gait 1   Step length and height - Swing foot (sound)  0   Step length and height - Foot clearence (sound) 0   Step length and height - Swing foot (prosthesis) 0   Step length and height - Foot clearence (prosthesis) 0   Step continuity 0   Turning 1   Variable juanita  1   Stepping over an obstacle 0   Stairs - Ascending  1   Stairs - Descending 1   Assistive device selection  2   AMPPRO total score 21   AMPPRO K LEVEL K1 (15-26)   AMPnoPRO total score 20               10/4 10/18         Vitals  Outcome measures asssessment *L-test        Patient Ed           Daily foot checks Educated          Sock ply management Educated Pre: 6  Post: 10         Don/doff  Educated patient on how to properly don/doff prosthesis         Wear time  Recommend increasing wear time to all day if tolerated           Recommend wearing  when prosthesis is not on                               Neuro Re-Ed Sit to stands Cuing for unlocking knee to sit          GT RW w/ wheelchair follow, supervision assist  20ft x 3  100ft x 1  Cuing for longer RLE step          Stairs negotiation 2 railings 4" steps  X 3  1 railing sideways 4"  X 3   Supervision to min A                                                                 Ther Ex           Aerobic conditioning           Sit to stands Educated                                                                            Ther Activity                                 Manual                                                                  Modalities

## 2023-10-21 ENCOUNTER — APPOINTMENT (EMERGENCY)
Dept: CT IMAGING | Facility: HOSPITAL | Age: 38
End: 2023-10-21
Payer: COMMERCIAL

## 2023-10-21 ENCOUNTER — APPOINTMENT (EMERGENCY)
Dept: RADIOLOGY | Facility: HOSPITAL | Age: 38
End: 2023-10-21
Payer: COMMERCIAL

## 2023-10-21 ENCOUNTER — HOSPITAL ENCOUNTER (EMERGENCY)
Facility: HOSPITAL | Age: 38
Discharge: HOME | End: 2023-10-21
Attending: EMERGENCY MEDICINE
Payer: COMMERCIAL

## 2023-10-21 VITALS
DIASTOLIC BLOOD PRESSURE: 80 MMHG | TEMPERATURE: 98.1 F | BODY MASS INDEX: 54.49 KG/M2 | WEIGHT: 288.36 LBS | OXYGEN SATURATION: 98 % | HEART RATE: 90 BPM | SYSTOLIC BLOOD PRESSURE: 135 MMHG | RESPIRATION RATE: 18 BRPM

## 2023-10-21 DIAGNOSIS — M25.511 RIGHT SHOULDER PAIN: Primary | ICD-10-CM

## 2023-10-21 DIAGNOSIS — R51.9 HEADACHE: ICD-10-CM

## 2023-10-21 DIAGNOSIS — G57.10 MERALGIA PARESTHETICA: ICD-10-CM

## 2023-10-21 LAB
ALBUMIN SERPL BCP-MCNC: 3.4 G/DL (ref 3.5–5)
ALP SERPL-CCNC: 115 U/L (ref 34–104)
ALT SERPL W P-5'-P-CCNC: 22 U/L (ref 7–52)
ANION GAP SERPL CALCULATED.3IONS-SCNC: 7 MMOL/L
AST SERPL W P-5'-P-CCNC: 14 U/L (ref 13–39)
BASOPHILS # BLD AUTO: 0.03 THOUSANDS/ÂΜL (ref 0–0.1)
BASOPHILS NFR BLD AUTO: 0 % (ref 0–1)
BILIRUB SERPL-MCNC: 0.21 MG/DL (ref 0.2–1)
BUN SERPL-MCNC: 15 MG/DL (ref 5–25)
CALCIUM ALBUM COR SERPL-MCNC: 9.1 MG/DL (ref 8.3–10.1)
CALCIUM SERPL-MCNC: 8.6 MG/DL (ref 8.4–10.2)
CHLORIDE SERPL-SCNC: 105 MMOL/L (ref 96–108)
CO2 SERPL-SCNC: 26 MMOL/L (ref 21–32)
CREAT SERPL-MCNC: 0.52 MG/DL (ref 0.6–1.3)
EOSINOPHIL # BLD AUTO: 0.24 THOUSAND/ÂΜL (ref 0–0.61)
EOSINOPHIL NFR BLD AUTO: 3 % (ref 0–6)
ERYTHROCYTE [DISTWIDTH] IN BLOOD BY AUTOMATED COUNT: 19.3 % (ref 11.6–15.1)
GFR SERPL CREATININE-BSD FRML MDRD: 123 ML/MIN/1.73SQ M
GLUCOSE SERPL-MCNC: 174 MG/DL (ref 65–140)
HCG SERPL QL: NEGATIVE
HCT VFR BLD AUTO: 26.3 % (ref 34.8–46.1)
HGB BLD-MCNC: 8.9 G/DL (ref 11.5–15.4)
IMM GRANULOCYTES # BLD AUTO: 0.05 THOUSAND/UL (ref 0–0.2)
IMM GRANULOCYTES NFR BLD AUTO: 1 % (ref 0–2)
LIPASE SERPL-CCNC: 12 U/L (ref 11–82)
LYMPHOCYTES # BLD AUTO: 2.68 THOUSANDS/ÂΜL (ref 0.6–4.47)
LYMPHOCYTES NFR BLD AUTO: 30 % (ref 14–44)
MCH RBC QN AUTO: 22.8 PG (ref 26.8–34.3)
MCHC RBC AUTO-ENTMCNC: 33.8 G/DL (ref 31.4–37.4)
MCV RBC AUTO: 67 FL (ref 82–98)
MONOCYTES # BLD AUTO: 0.38 THOUSAND/ÂΜL (ref 0.17–1.22)
MONOCYTES NFR BLD AUTO: 4 % (ref 4–12)
NEUTROPHILS # BLD AUTO: 5.6 THOUSANDS/ÂΜL (ref 1.85–7.62)
NEUTS SEG NFR BLD AUTO: 62 % (ref 43–75)
NRBC BLD AUTO-RTO: 0 /100 WBCS
PLATELET # BLD AUTO: 389 THOUSANDS/UL (ref 149–390)
PMV BLD AUTO: 9.5 FL (ref 8.9–12.7)
POTASSIUM SERPL-SCNC: 3.7 MMOL/L (ref 3.5–5.3)
PROT SERPL-MCNC: 6.6 G/DL (ref 6.4–8.4)
RBC # BLD AUTO: 3.9 MILLION/UL (ref 3.81–5.12)
SODIUM SERPL-SCNC: 138 MMOL/L (ref 135–147)
T4 FREE SERPL-MCNC: 0.77 NG/DL (ref 0.61–1.12)
TSH SERPL DL<=0.05 MIU/L-ACNC: 4.94 UIU/ML (ref 0.45–4.5)
WBC # BLD AUTO: 8.98 THOUSAND/UL (ref 4.31–10.16)

## 2023-10-21 PROCEDURE — 73552 X-RAY EXAM OF FEMUR 2/>: CPT

## 2023-10-21 PROCEDURE — 84703 CHORIONIC GONADOTROPIN ASSAY: CPT

## 2023-10-21 PROCEDURE — 85025 COMPLETE CBC W/AUTO DIFF WBC: CPT

## 2023-10-21 PROCEDURE — 99285 EMERGENCY DEPT VISIT HI MDM: CPT | Performed by: EMERGENCY MEDICINE

## 2023-10-21 PROCEDURE — G1004 CDSM NDSC: HCPCS

## 2023-10-21 PROCEDURE — 96365 THER/PROPH/DIAG IV INF INIT: CPT

## 2023-10-21 PROCEDURE — 74177 CT ABD & PELVIS W/CONTRAST: CPT

## 2023-10-21 PROCEDURE — 36415 COLL VENOUS BLD VENIPUNCTURE: CPT

## 2023-10-21 PROCEDURE — 73030 X-RAY EXAM OF SHOULDER: CPT

## 2023-10-21 PROCEDURE — 84439 ASSAY OF FREE THYROXINE: CPT

## 2023-10-21 PROCEDURE — 96375 TX/PRO/DX INJ NEW DRUG ADDON: CPT

## 2023-10-21 PROCEDURE — 70496 CT ANGIOGRAPHY HEAD: CPT

## 2023-10-21 PROCEDURE — 80053 COMPREHEN METABOLIC PANEL: CPT

## 2023-10-21 PROCEDURE — 83690 ASSAY OF LIPASE: CPT

## 2023-10-21 PROCEDURE — 70498 CT ANGIOGRAPHY NECK: CPT

## 2023-10-21 PROCEDURE — 84443 ASSAY THYROID STIM HORMONE: CPT

## 2023-10-21 PROCEDURE — 71045 X-RAY EXAM CHEST 1 VIEW: CPT

## 2023-10-21 PROCEDURE — 72170 X-RAY EXAM OF PELVIS: CPT

## 2023-10-21 PROCEDURE — 99284 EMERGENCY DEPT VISIT MOD MDM: CPT

## 2023-10-21 PROCEDURE — 96366 THER/PROPH/DIAG IV INF ADDON: CPT

## 2023-10-21 RX ORDER — MAGNESIUM SULFATE HEPTAHYDRATE 40 MG/ML
2 INJECTION, SOLUTION INTRAVENOUS ONCE
Status: COMPLETED | OUTPATIENT
Start: 2023-10-21 | End: 2023-10-21

## 2023-10-21 RX ORDER — DEXAMETHASONE SODIUM PHOSPHATE 10 MG/ML
10 INJECTION, SOLUTION INTRAMUSCULAR; INTRAVENOUS ONCE
Status: COMPLETED | OUTPATIENT
Start: 2023-10-21 | End: 2023-10-21

## 2023-10-21 RX ORDER — DROPERIDOL 2.5 MG/ML
1.25 INJECTION, SOLUTION INTRAMUSCULAR; INTRAVENOUS ONCE
Status: COMPLETED | OUTPATIENT
Start: 2023-10-21 | End: 2023-10-21

## 2023-10-21 RX ORDER — DIPHENHYDRAMINE HYDROCHLORIDE 50 MG/ML
25 INJECTION INTRAMUSCULAR; INTRAVENOUS ONCE
Status: COMPLETED | OUTPATIENT
Start: 2023-10-21 | End: 2023-10-21

## 2023-10-21 RX ADMIN — MAGNESIUM SULFATE HEPTAHYDRATE 2 G: 40 INJECTION, SOLUTION INTRAVENOUS at 05:25

## 2023-10-21 RX ADMIN — DROPERIDOL 1.25 MG: 2.5 INJECTION, SOLUTION INTRAMUSCULAR; INTRAVENOUS at 05:25

## 2023-10-21 RX ADMIN — IOHEXOL 100 ML: 350 INJECTION, SOLUTION INTRAVENOUS at 05:26

## 2023-10-21 RX ADMIN — DIPHENHYDRAMINE HYDROCHLORIDE 25 MG: 50 INJECTION, SOLUTION INTRAMUSCULAR; INTRAVENOUS at 05:25

## 2023-10-21 RX ADMIN — DEXAMETHASONE SODIUM PHOSPHATE 10 MG: 10 INJECTION INTRAMUSCULAR; INTRAVENOUS at 05:25

## 2023-10-21 RX ADMIN — SODIUM CHLORIDE 1000 ML: 0.9 INJECTION, SOLUTION INTRAVENOUS at 05:25

## 2023-10-21 NOTE — ED ATTENDING ATTESTATION
10/21/2023  ITricia DO, saw and evaluated the patient. I have discussed the patient with the resident/non-physician practitioner and agree with the resident's/non-physician practitioner's findings, Plan of Care, and MDM as documented in the resident's/non-physician practitioner's note, except where noted. All available labs and Radiology studies were reviewed. I was present for key portions of any procedure(s) performed by the resident/non-physician practitioner and I was immediately available to provide assistance. At this point I agree with the current assessment done in the Emergency Department. I have conducted an independent evaluation of this patient a history and physical is as follows:    39 yo F, in the ED for a headache, right sided neck pain that radiates into her R shoulder and arm, right abdominal pain, right leg pain/numbness. Hx of morbid obesity, L leg amputation (AKA) 7 months ago 2/2 PAD, infection. PE:  The patient is morbidly obese, not ill appearing, non-toxic, in NAD. Head: normocephalic, atraumatic. R neck/trap tender to palpation, no midline tenderness. HEENT: mucous membranes moist.  Lungs: CTA b/l, no resp distress. Heart: RRR. No M/R/G. Abdomen: NT, ND, no R/R/G. Neuro: CN2-12 intact, GCS 15. Normal strength and sensation, normal speech and gait. Cap refill < 2 sec, skin warm and dry. No rashes or lesions. L leg AKA - stump well healed. Psych; tearful, depressed appearing, emotionally labile. ED workup: migraine meds given, CT head/neck, abd/pelvis, xrays of R arm and R leg negative. Stable for d/c home. Likely cervical radiculopathy, migraine headache, R sided pain otherwise with unclear etiology, but may be related to her morbid obesity and immobility, in bed lying in right side mostly secondary to the L leg AKA.     ED Course         Critical Care Time  Procedures

## 2023-10-21 NOTE — DISCHARGE INSTRUCTIONS
Please follow-up with PCP for reassessment and management of of symptoms as well as monitoring of anemia and slightly elevated TSH. Thank you for allowing us to take part in your care.

## 2023-10-21 NOTE — ED PROVIDER NOTES
History  Chief Complaint   Patient presents with    Hip Pain     Pt reports R hip pain x 4 days that radiates up to neck. Pt also reports "massive migraine" which she took 1200mg ibuprofen for, w/o relief. Pt states she began therapy for L leg recently, but has no pain there. Pt has L AKA. 29-year-old female presenting due to right-sided body pain and severe headache. Patient states the pain has been going on for 4 days where she has pain on the right side of her body. The pain is the worst at her right shoulder and her right thigh where she also has decreased sensation on her lateral side of the thigh. Patient states she was previously seen for her headache as well but nothing helped. Headaches have been constant for the last 2 months but patient denies any photophobia or phonophobia, weakness, changes in vision, difficulty speaking, facial droop. Patient had diarrhea today but otherwise no infectious symptoms. Denies chest pain and shortness of breath. Prior to Admission Medications   Prescriptions Last Dose Informant Patient Reported? Taking? Advair -21 MCG/ACT inhaler   No No   Sig: Inhale 2 puffs 2 (two) times a day Rinse mouth after use   Alcohol Swabs (Pharmacist Choice Alcohol) PADS  Outside Facility (Specify), Self No No   Sig: USE 3-4 TIMES AS DIRECTED.    Blood Glucose Monitoring Suppl (ONE TOUCH ULTRA 2) w/Device KIT   No No   Sig: BY DEVICE ROUTE 2 (TWO) TIMES A DAY CHECK BLOOD SUGARS AND RECORD VALUE AND TIME OF DAY TWICE A DAY   Blood Pressure Monitoring (Blood Pressure Monitor Automat) KATLYN   No No   Sig: Use Daily as Directed   Comfort EZ Pen Needles 33G X 4 MM MISC  Outside Facility (Specify), Self No No   Sig: USE AS DIRECTED   DULoxetine (CYMBALTA) 30 mg delayed release capsule   No No   Sig: TAKE 2 CAPSULES (60 MG TOTAL) BY MOUTH DAILY   Global Inject Ease Lancets 30G MISC   No No   Sig: USE 3 (THREE) TIMES A DAY   Insulin Pen Needle (Comfort EZ Pen Needles) 32G X 4 MM [facility]  Outside Facility (Specify), Self No No   Sig: Inject under the skin 4 (four) times a day (before meals and at bedtime)   OneTouch Ultra test strip   No No   Sig: Use 1 each daily as needed (before meals) Use as instructed   Trulicity 1.5 CH/4.8EZ injection   No No   Sig: AS DIRECTED WEEKLY   Xarelto 20 MG tablet   No No   Sig: TAKE 1 TABLET (20 MG TOTAL) BY MOUTH DAILY WITH BREAKFAST   albuterol (PROVENTIL HFA,VENTOLIN HFA) 90 mcg/act inhaler   No No   Sig: INHALE 2 PUFFS EVERY 4 (FOUR) HOURS AS NEEDED FOR WHEEZING OR SHORTNESS OF BREATH   aspirin 81 mg chewable tablet   No No   Sig: CHEW 1 TABLET (81 MG TOTAL) DAILY   atorvastatin (LIPITOR) 40 mg tablet   No No   Sig: Take 1 tablet (40 mg total) by mouth daily with dinner   glycerin-hypromellose- (ARTIFICIAL TEARS) 0.2-0.2-1 % SOLN   No No   Sig: Administer 2 drops to both eyes every 6 (six) hours as needed (for eye discomfort)   Patient not taking: Reported on 9/18/2023   hydrOXYzine HCL (ATARAX) 50 mg tablet   No No   Sig: TAKE 1 TABLET (50 MG TOTAL) BY MOUTH DAILY AT BEDTIME AS NEEDED FOR ITCHING OR ANXIETY   insulin glargine (Semglee) 100 units/mL subcutaneous injection   No No   Sig: Inject 30 Units under the skin every 12 (twelve) hours   insulin lispro (HumaLOG) 100 units/mL injection   No No   Sig: Inject 4-16 Units under the skin 3 (three) times a day before meals   ketotifen (ZADITOR) 0.025 % ophthalmic solution   No No   Sig: Administer 1 drop to both eyes 2 (two) times a day as needed (for eye discomfort)   Patient not taking: Reported on 9/18/2023   lisinopril (ZESTRIL) 20 mg tablet   No No   Sig: Take 1 tablet (20 mg total) by mouth daily   metFORMIN (GLUCOPHAGE) 1000 MG tablet   No No   Sig: TAKE ONE (1) TABLET BY MOUTH TWICE DAILY WITH FOOD   nicotine (NICODERM CQ) 21 mg/24 hr TD 24 hr patch   No No   Sig: Place 1 patch on the skin over 24 hours daily   prazosin (MINIPRESS) 1 mg capsule   No No   Sig: Take 1 capsule (1 mg total) by mouth daily at bedtime   topiramate (TOPAMAX) 25 mg tablet   No No   Sig: TAKE 1 TABLET (25 MG TOTAL) BY MOUTH 2 (TWO) TIMES A DAY      Facility-Administered Medications: None       Past Medical History:   Diagnosis Date    Asthma     Atherosclerosis     Bipolar 1 disorder (HCC)     COPD (chronic obstructive pulmonary disease) (HCC)     Depression     Diabetes mellitus (HCC)     Hypertension     Psychiatric disorder     cutting history    PTSD (post-traumatic stress disorder)     Schizoaffective disorder ([address])     Tendonitis        Past Surgical History:   Procedure Laterality Date    AMPUTATION ABOVE KNEE (AKA) Left 3/29/2023    Procedure: AMPUTATION ABOVE KNEE (AKA); Surgeon: Barbara Ann MD;  Location: [facility];  Service: Vascular    BYPASS FEMORAL-POPLITEAL Left 2021    Procedure: Left Common Femoral Below Knee to Popliteal Bypass with Insitu GSV graft. Left Lower Extremity Angiogram;  Surgeon: Barbara Ann MD;  Location: [facility];  Service: Vascular     SECTION      EAR SURGERY      IR LOWER EXTREMITY ANGIOGRAM  2021    IR LOWER EXTREMITY ANGIOGRAM  2021    DE SLCTV CATHJ 3RD+ ORD SLCTV ABDL PEL/LXTR [address] [place]  Left 3/24/2023    Procedure: Left leg angiogram;  Surgeon: Robe Cheung DO;  Location: [facility];  Service: Vascular    TUBAL LIGATION         Family History   Problem Relation Age of Onset    Hypertension Mother     Diabetes Mother     HIV Mother     Heart disease Mother     No Known Problems Father      I have reviewed and agree with the history as documented.     E-Cigarette/Vaping    E-Cigarette Use Never User      E-Cigarette/Vaping Substances    Nicotine No     THC No     CBD No     Flavoring No     Other No     Unknown No      Social History     Tobacco Use    Smoking status: Former     Packs/day: 1.00     Years: 20.00     Total pack years: 20.00     Types: Cigarettes     Quit date: 3/24/2023     Years since quittin.5    Smokeless tobacco: Former     Quit date: 4/29/2022   Vaping Use    Vaping Use: Never used   Substance Use Topics    Alcohol use: Not Currently     Comment: not currently    Drug use: Not Currently     Types: Cocaine, Marijuana, "Crack" cocaine     Comment: 1 years clean from crack cocaine since 2022        Review of Systems   Constitutional:  Positive for chills. Negative for fever. HENT:  Negative for ear pain and sore throat. Eyes:  Negative for pain and visual disturbance. Respiratory:  Negative for cough and shortness of breath. Cardiovascular:  Negative for chest pain and palpitations. Gastrointestinal:  Positive for abdominal pain and diarrhea. Negative for blood in stool, nausea and vomiting. Genitourinary:  Negative for dysuria and hematuria. Musculoskeletal:  Negative for arthralgias, back pain, neck pain and neck stiffness. Skin:  Negative for color change, rash and wound. Neurological:  Positive for numbness and headaches. Negative for seizures, syncope, facial asymmetry, speech difficulty, weakness and light-headedness. All other systems reviewed and are negative. Physical Exam  ED Triage Vitals [10/21/23 0214]   Temperature Pulse Respirations Blood Pressure SpO2   98.1 °F (36.7 °C) 98 20 142/85 98 %      Temp Source Heart Rate Source Patient Position - Orthostatic VS BP Location FiO2 (%)   Oral Monitor Lying Right arm --      Pain Score       10 - Worst Possible Pain             Orthostatic Vital Signs  Vitals:    10/21/23 0214   BP: 142/85   Pulse: 98   Patient Position - Orthostatic VS: Lying       Physical Exam  Vitals and nursing note reviewed. Constitutional:       Appearance: She is well-developed. Comments: Pt very emotionally labile during exam   HENT:      Head: Normocephalic and atraumatic. Nose: Nose normal. No congestion. Mouth/Throat:      Mouth: Mucous membranes are moist.      Pharynx: Oropharynx is clear. No oropharyngeal exudate or posterior oropharyngeal erythema.    Eyes: Extraocular Movements: Extraocular movements intact. Conjunctiva/sclera: Conjunctivae normal.      Pupils: Pupils are equal, round, and reactive to light. Cardiovascular:      Rate and Rhythm: Normal rate and regular rhythm. Pulses: Normal pulses. Heart sounds: Normal heart sounds. No murmur heard. Pulmonary:      Effort: Pulmonary effort is normal. No respiratory distress. Breath sounds: Normal breath sounds. Chest:      Chest wall: No tenderness. Abdominal:      General: Abdomen is flat. Bowel sounds are normal.      Palpations: Abdomen is soft. Tenderness: There is abdominal tenderness. There is no right CVA tenderness or left CVA tenderness. Comments: Right sided abd tenderness when pt asked, no pain appreciated with distraction. Musculoskeletal:         General: Tenderness present. No deformity or signs of injury. Normal range of motion. Cervical back: Normal range of motion and neck supple. No rigidity or tenderness. Comments: Left AKA, well healed. Full ROM of upper extremities, severe tenderness to slight touch of Right shoulder. Pulses and sensation intact on upper extremities b/l    Severe tenderness to light palpation of Right lower extremity in distribution of Lateral cutaneous nerve with decreased sensation. Inner thight, posterior thigh and below the knee no pain and normal sensation. No external signs of injury. Skin:     General: Skin is warm and dry. Findings: No bruising, lesion or rash. Neurological:      General: No focal deficit present. Mental Status: She is alert and oriented to person, place, and time. Cranial Nerves: No cranial nerve deficit. Sensory: Sensory deficit present.          ED Medications  Medications   sodium chloride 0.9 % bolus 1,000 mL (1,000 mL Intravenous New Bag 10/21/23 0525)   dexamethasone (PF) (DECADRON) injection 10 mg (10 mg Intravenous Given 10/21/23 0525)   diphenhydrAMINE (BENADRYL) injection 25 mg (25 mg Intravenous Given 10/21/23 0525)   magnesium sulfate 2 g/50 mL IVPB (premix) 2 g (2 g Intravenous New Bag 10/21/23 0525)   droperidol (INAPSINE) injection 1.25 mg (1.25 mg Intravenous Given 10/21/23 0525)   iohexol (OMNIPAQUE) 350 MG/ML injection (MULTI-DOSE) 100 mL (100 mL Intravenous Given 10/21/23 0526)       Diagnostic Studies  Results Reviewed       Procedure Component Value Units Date/Time    TSH, 3rd generation with Free T4 reflex [[id number]]  (Abnormal) Collected: 10/21/23 0339    Lab Status: Final result Specimen: Blood from Arm, Right Updated: 10/21/23 0455     TSH 3RD GENERATON 4.941 uIU/mL     T4, free [[id number]] Collected: 10/21/23 0339    Lab Status:  In process Specimen: Blood from Arm, Right Updated: 10/21/23 0455    hCG, qualitative pregnancy [[id number]]  (Normal) Collected: 10/21/23 0339    Lab Status: Final result Specimen: Blood from Arm, Right Updated: 10/21/23 0453     Preg, Serum Negative    Comprehensive metabolic panel [[id number]]  (Abnormal) Collected: 10/21/23 0339    Lab Status: Final result Specimen: Blood from Arm, Right Updated: 10/21/23 0408     Sodium 138 mmol/L      Potassium 3.7 mmol/L      Chloride 105 mmol/L      CO2 26 mmol/L      ANION GAP 7 mmol/L      BUN 15 mg/dL      Creatinine 0.52 mg/dL      Glucose 174 mg/dL      Calcium 8.6 mg/dL      Corrected Calcium 9.1 mg/dL      AST 14 U/L      ALT 22 U/L      Alkaline Phosphatase 115 U/L      Total Protein 6.6 g/dL      Albumin 3.4 g/dL      Total Bilirubin 0.21 mg/dL      eGFR 123 ml/min/1.73sq m     Narrative:      [place] guidelines for Chronic Kidney Disease (CKD):     Stage 1 with normal or high GFR (GFR > 90 mL/min/1.73 square meters)    Stage 2 Mild CKD (GFR = 60-89 mL/min/1.73 square meters)    Stage 3A Moderate CKD (GFR = 45-59 mL/min/1.73 square meters)    Stage 3B Moderate CKD (GFR = 30-44 mL/min/1.73 square meters)    Stage 4 Severe CKD (GFR = 15-29 mL/min/1.73 square meters)    Stage 5 End Stage CKD (GFR <15 mL/min/1.73 square meters)  Note: GFR calculation is accurate only with a steady state creatinine    Lipase [[id number]]  (Normal) Collected: 10/21/23 0339    Lab Status: Final result Specimen: Blood from Arm, Right Updated: 10/21/23 0408     Lipase 12 u/L     CBC and differential [[id number]]  (Abnormal) Collected: 10/21/23 0339    Lab Status: Final result Specimen: Blood from Arm, Right Updated: 10/21/23 0347     WBC 8.98 Thousand/uL      RBC 3.90 Million/uL      Hemoglobin 8.9 g/dL      Hematocrit 26.3 %      MCV 67 fL      MCH 22.8 pg      MCHC 33.8 g/dL      RDW 19.3 %      MPV 9.5 fL      Platelets 005 Thousands/uL      nRBC 0 /100 WBCs      Neutrophils Relative 62 %      Immat GRANS % 1 %      Lymphocytes Relative 30 %      Monocytes Relative 4 %      Eosinophils Relative 3 %      Basophils Relative 0 %      Neutrophils Absolute 5.60 Thousands/µL      Immature Grans Absolute 0.05 Thousand/uL      Lymphocytes Absolute 2.68 Thousands/µL      Monocytes Absolute 0.38 Thousand/µL      Eosinophils Absolute 0.24 Thousand/µL      Basophils Absolute 0.03 Thousands/µL                    CT abdomen pelvis with contrast   Final Result by Grace Salinas MD (10/21 0543)      No evidence of acute abdominopelvic process. 3.7 cm uterine fundal exophytic fibroid. Additional chronic findings and negatives as above. Workstation performed: [id number]         CTA head and neck with and without contrast   Final Result by Grace Salinas MD (10/21 0600)      CT head:      No evidence of acute intracranial process or significant interval change. CTA head:      No arterial occlusion or significant stenosis. CTA neck:      No arterial occlusion or significant stenosis.       Workstation performed: [id number]         XR chest 1 view portable   ED Interpretation by Zohaib English MD (10/21 9184)   My personal interpretation-no acute cardiopulmonary disease      XR shoulder 2+ views RIGHT   ED Interpretation by Jim Johansen MD (10/21 4112)   My personal interpretation-no acute osseous abnormalities or dislocation. XR femur 2 views RIGHT   ED Interpretation by Jim Johansen MD (10/21 6768)   My personal interpretation-no acute osseous abnormalities. XR pelvis ap only 1 or 2 vw   ED Interpretation by Jim Johansen MD (10/21 4451)   My personal interpretation-no acute osseous abnormalities. Procedures  Procedures      ED Course  ED Course as of 10/21/23 0651   Sat Oct 21, 2023   0317 40 yoF presenting with headache, right shoulder, abdominal and thigh pain. Symptoms very nondescript, due to pt hx of vascular disease will evaluate with CTA head + neck as well as CT AP and XR.   0402 CBC and differential(!)  Anemic at 8.9 but otherwise unremarkable. 6989 Comprehensive metabolic panel(!)  Hyperglycemic at 174 and slightly elevated alk phos at 115 but otherwise unremarkable. 0454 Lipase: 12  WNL   0454 PREGNANCY, SERUM: Negative  Not pregnant   0454 Vitals reviewed, hypertensive but otherwise WNL   0536 TSH 3RD GENERATON(!): 4.941  elevated   [id number] CTA head and neck with and without contrast  CT head:     No evidence of acute intracranial process or significant interval change. CTA head:     No arterial occlusion or significant stenosis. CTA neck:     No arterial occlusion or significant stenosis. 0593 CT abdomen pelvis with contrast  No evidence of acute abdominopelvic process. 3.7 cm uterine fundal exophytic fibroid. Additional chronic findings and negatives as above. 3776 Work-up thus far largely unremarkable. Patient slightly elevated TSH as well as anemia of 8.9. Recommend following up with PCP for reassessment and management of symptoms. Patient is agreeable and appropriate discharge at this time. Vitals have remained stable. Return precaution discussed.                              SBIRT 22yo+ Flowsheet Row Most Recent Value   Initial Alcohol Screen: US AUDIT-C     1. How often do you have a drink containing alcohol? 0 Filed at: 10/21/2023 0213   2. How many drinks containing alcohol do you have on a typical day you are drinking? 0 Filed at: 10/21/2023 0213   3a. Male UNDER 65: How often do you have five or more drinks on one occasion? 0 Filed at: 10/21/2023 0213   3b. FEMALE Any Age, or MALE 65+: How often do you have 4 or more drinks on one occassion? 0 Filed at: 10/21/2023 6392   Audit-C Score 0 Filed at: 10/21/2023 5886   CRISTÓBAL: How many times in the past year have you. .. Used an illegal drug or used a prescription medication for non-medical reasons? Never Filed at: 10/21/2023 5036                  Medical Decision Making  Amount and/or Complexity of Data Reviewed  Labs: ordered. Decision-making details documented in ED Course. Radiology: ordered. Decision-making details documented in ED Course. Risk  Prescription drug management. Disposition  Final diagnoses:   Right shoulder pain   Headache   Meralgia paresthetica     Time reflects when diagnosis was documented in both MDM as applicable and the Disposition within this note       Time User Action Codes Description Comment    10/21/2023  6:14 AM [place] Riser Add [V86.935] Right shoulder pain     10/21/2023  6:14 AM [place] Riser Add [R51.9] Headache     10/21/2023  6:15 AM [place] Riser Add [G57.10] Meralgia paresthetica           ED Disposition       ED Disposition   Discharge    Condition   Stable    Date/Time   Sat Oct 21, 2023 0614    Comment   Yaya Butcher discharge to home/self care.                    Follow-up Information       Follow up With Specialties Details Why Contact Info Additional Information    [address] Emergency Department Emergency Medicine   [address] [facility] [address] [facility], [address] [place] [address] Albert Ratliff [facility] (Anthony), [address], [address]            Patient's Medications   Discharge Prescriptions    No medications on file     No discharge procedures on file. PDMP Review         Value Time User    PDMP Reviewed  Yes 8/24/2023  6:24 PM Lisseth Haq DO             ED Provider  Attending physically available and evaluated Romel Reid. I managed the patient along with the ED Attending.     Electronically Signed by           Adrienne Godinez MD  10/21/23 9754

## 2023-10-23 ENCOUNTER — TELEPHONE (OUTPATIENT)
Dept: NEUROLOGY | Facility: CLINIC | Age: 38
End: 2023-10-23

## 2023-10-25 ENCOUNTER — OFFICE VISIT (OUTPATIENT)
Dept: PHYSICAL THERAPY | Facility: CLINIC | Age: 38
End: 2023-10-25
Payer: COMMERCIAL

## 2023-10-25 DIAGNOSIS — Z89.612 S/P AKA (ABOVE KNEE AMPUTATION) UNILATERAL, LEFT (HCC): Primary | ICD-10-CM

## 2023-10-25 PROCEDURE — 97530 THERAPEUTIC ACTIVITIES: CPT | Performed by: PHYSICAL THERAPIST

## 2023-10-25 NOTE — PROGRESS NOTES
Daily Note     Today's date: 10/25/2023  Patient name: Renetta Holden  : 1985  MRN: [id number]  Referring provider: ADAM Cuenca  Dx:   Encounter Diagnosis     ICD-10-CM    1. S/P AKA (above knee amputation) unilateral, left ([address])  E44.099           Start Time: 1000  Stop Time: 1100  Total time in clinic (min): 60 minutes    Insurance: Payor: [employer]  Auth needed Yes   Number of visits authorized: 32  Visit count: 3 of 32;   PN due 24  POC Expiration: 23  Auth Date Ranges: 10/4/23 - 24    Visits Requested Visits Authorized Visits Completed Visits Scheduled   32 [phone] 9       Subjective: Has not been wearing the . Still working on moving from the hotel. Been having episodes of hyperglycemia and hypoglycemia recently. Objective: See treatment diary below  LLE assessment - no skin irritation with gel liner or with prosthesis      Assessment: Completed session with prosthetist Quinton De León CPO to review proper donning of her gel liner. Patient benefited from proper instructions to make sure the short/long lengths are appropriate to the medial or lateral side, avoid wrinkles, and avoid extra space at the bottom of the gel liner. Without socks, patient was not fully in her socket. Likely due to weight gain and/or not using . Plan for patient to consistently use  for 1 week to determine if that will decrease volume to properly don her socket. If no change, patient likely will need new socket fabricated. Will contact patient's prosthetist if this if needed. Patient very fatigued after practicing this and was unable to continue prosthetic training today. Patient with some signs of brain fog and poor concentration which may be due to her glucose levels. Patient did not bring glucose monitor today to assess if it was hypo or hyperglycemic. Instructed patient to bring this next session.  Patient demonstrated fatigue post treatment, exhibited good technique with therapeutic exercises, and would benefit from continued PT to improve her independence with functional mobility with her prosthesis. Plan: Continue per plan of care.       Precautions: L TFA, bipolar, depression, HTN, PAD    Outcome Measure 10/4 10/18   ABC 30%    PLUS-M 34.1    AMPRO  21/36 K1   L-Test     2MWT  50ft with RW   Gait speed  0.30 m/s with RW   5x STS  26s without hands but does not use her prosthetic leg                10/4 10/18 10/25        Vitals  Outcome measures asssessment  *L-test       Patient Ed           Daily foot checks Educated          Sock ply management Educated Pre: 6  Post: 10 Pre: 5  Post: 0        Don/doff  Educated patient on how to properly don/doff prosthesis 30 min        Wear time  Recommend increasing wear time to all day if tolerated           Recommend wearing  when prosthesis is not on Recommend wearing  when prosthesis is not on                              Neuro Re-Ed           Sit to stands Cuing for unlocking knee to sit          GT RW w/ wheelchair follow, supervision assist  20ft x 3  100ft x 1  Cuing for longer RLE step          Stairs negotiation 2 railings 4" steps  X 3  1 railing sideways 4"  X 3   Supervision to min A                                                                 Ther Ex           Aerobic conditioning           Sit to stands Educated                                                                            Ther Activity                                 Manual                                                                  Modalities

## 2023-10-27 ENCOUNTER — TELEPHONE (OUTPATIENT)
Dept: NEUROLOGY | Facility: CLINIC | Age: 38
End: 2023-10-27

## 2023-10-27 ENCOUNTER — APPOINTMENT (OUTPATIENT)
Dept: PHYSICAL THERAPY | Facility: CLINIC | Age: 38
End: 2023-10-27
Payer: COMMERCIAL

## 2023-10-27 NOTE — TELEPHONE ENCOUNTER
Pt called in to r/s her appt with Dr. Toro Nam due to moving on 11/1. New Appt is now 3/7/24 @ 2:30 with Dr. Toro Nam.  Added to wait list.

## 2023-10-31 DIAGNOSIS — E11.9 DIABETES (HCC): ICD-10-CM

## 2023-11-01 ENCOUNTER — APPOINTMENT (OUTPATIENT)
Dept: PHYSICAL THERAPY | Facility: CLINIC | Age: 38
End: 2023-11-01
Payer: COMMERCIAL

## 2023-11-01 DIAGNOSIS — Z79.4 TYPE 2 DIABETES MELLITUS WITH DIABETIC POLYNEUROPATHY, WITH LONG-TERM CURRENT USE OF INSULIN (HCC): ICD-10-CM

## 2023-11-01 DIAGNOSIS — E11.42 TYPE 2 DIABETES MELLITUS WITH DIABETIC POLYNEUROPATHY, WITH LONG-TERM CURRENT USE OF INSULIN (HCC): ICD-10-CM

## 2023-11-01 RX ORDER — LANCETS 30 GAUGE
EACH MISCELLANEOUS 3 TIMES DAILY
Qty: 100 EACH | Refills: 0 | Status: SHIPPED | OUTPATIENT
Start: 2023-11-01

## 2023-11-03 ENCOUNTER — APPOINTMENT (OUTPATIENT)
Dept: PHYSICAL THERAPY | Facility: CLINIC | Age: 38
End: 2023-11-03
Payer: COMMERCIAL

## 2023-11-06 DIAGNOSIS — I73.9 PAD (PERIPHERAL ARTERY DISEASE) (HCC): ICD-10-CM

## 2023-11-06 RX ORDER — RIVAROXABAN 20 MG/1
TABLET, FILM COATED ORAL
Qty: 30 TABLET | Refills: 0 | Status: SHIPPED | OUTPATIENT
Start: 2023-11-06

## 2023-11-07 DIAGNOSIS — Z79.4 TYPE 2 DIABETES MELLITUS WITH DIABETIC POLYNEUROPATHY, WITH LONG-TERM CURRENT USE OF INSULIN (HCC): ICD-10-CM

## 2023-11-07 DIAGNOSIS — E11.42 TYPE 2 DIABETES MELLITUS WITH DIABETIC POLYNEUROPATHY, WITH LONG-TERM CURRENT USE OF INSULIN (HCC): ICD-10-CM

## 2023-11-07 RX ORDER — DULAGLUTIDE 1.5 MG/.5ML
INJECTION, SOLUTION SUBCUTANEOUS
Qty: 2 ML | Refills: 2 | Status: SHIPPED | OUTPATIENT
Start: 2023-11-07

## 2023-11-08 ENCOUNTER — APPOINTMENT (OUTPATIENT)
Dept: PHYSICAL THERAPY | Facility: CLINIC | Age: 38
End: 2023-11-08
Payer: COMMERCIAL

## 2023-11-10 ENCOUNTER — APPOINTMENT (OUTPATIENT)
Dept: PHYSICAL THERAPY | Facility: CLINIC | Age: 38
End: 2023-11-10
Payer: COMMERCIAL

## 2023-11-14 ENCOUNTER — OFFICE VISIT (OUTPATIENT)
Dept: FAMILY MEDICINE CLINIC | Facility: CLINIC | Age: 38
End: 2023-11-14
Payer: COMMERCIAL

## 2023-11-14 VITALS
OXYGEN SATURATION: 98 % | TEMPERATURE: 97.5 F | DIASTOLIC BLOOD PRESSURE: 80 MMHG | BODY MASS INDEX: 53.54 KG/M2 | HEIGHT: 61 IN | WEIGHT: 283.6 LBS | HEART RATE: 96 BPM | SYSTOLIC BLOOD PRESSURE: 134 MMHG

## 2023-11-14 DIAGNOSIS — D50.9 IRON DEFICIENCY ANEMIA, UNSPECIFIED IRON DEFICIENCY ANEMIA TYPE: ICD-10-CM

## 2023-11-14 DIAGNOSIS — I10 HYPERTENSION, UNSPECIFIED TYPE: ICD-10-CM

## 2023-11-14 DIAGNOSIS — R32 URINARY INCONTINENCE, UNSPECIFIED TYPE: ICD-10-CM

## 2023-11-14 DIAGNOSIS — Z79.4 TYPE 2 DIABETES MELLITUS WITH DIABETIC POLYNEUROPATHY, WITH LONG-TERM CURRENT USE OF INSULIN (HCC): ICD-10-CM

## 2023-11-14 DIAGNOSIS — E11.42 TYPE 2 DIABETES MELLITUS WITH DIABETIC POLYNEUROPATHY, WITH LONG-TERM CURRENT USE OF INSULIN (HCC): ICD-10-CM

## 2023-11-14 DIAGNOSIS — Z89.612 S/P AKA (ABOVE KNEE AMPUTATION) UNILATERAL, LEFT (HCC): Primary | ICD-10-CM

## 2023-11-14 PROCEDURE — 99213 OFFICE O/P EST LOW 20 MIN: CPT

## 2023-11-14 RX ORDER — DEXAMETHASONE SODIUM PHOSPHATE 4 MG/ML
INJECTION, SOLUTION INTRAMUSCULAR; INTRAVENOUS 2 TIMES DAILY
COMMUNITY
Start: 2023-11-09

## 2023-11-14 RX ORDER — FERROUS SULFATE 324(65)MG
324 TABLET, DELAYED RELEASE (ENTERIC COATED) ORAL EVERY OTHER DAY
Qty: 30 TABLET | Refills: 0 | Status: SHIPPED | OUTPATIENT
Start: 2023-11-14

## 2023-11-14 NOTE — ASSESSMENT & PLAN NOTE
Pt is not yet due for repeat A1c. Lab Results   Component Value Date    HGBA1C 9.9 (H) 08/25/2023     Reports poor sugar control, typically running high, as high as 400s. - Discussed titrating up short-acting insulin after meals to address hyperglycemia. Start with a small 5unit dose and recheck blood sugars 30min-1h after, then repeat as appropriate until blood sugar below 200.   - Pressed importance of lifestyle factors in blood sugar control, for now focus on reducing consumption of sugary beverages  - Pt to follow up for diabetes visit in 1 month

## 2023-11-14 NOTE — PROGRESS NOTES
Name: Mercedes Nieves      : 1985      MRN: [id number]  Encounter Provider: Jelani Church MD  Encounter Date: 2023   Encounter department: [facility]    Assessment & Plan     1. S/P AKA (above knee amputation) unilateral, left (HCC)  Assessment & Plan:  Pt has not been able to fit in her prosthesis and is working with PT to manage this. She has not been able to ambulate for this reason since her amputation in 2023, and relies on a manual wheelchair to get around. She is able to manage in her home and able to perform all ADLs including feeding herself, bathing herself, and going to the bathroom. She is unable to perform IADLs most notably grocery shopping because she is not able to travel distances independently in a manual wheelchair secondary upper body fatigue. She is a good candidate for a power scooter for these reasons.  - Documentation this visit for power scooter. Pt to reach out to insurance and medical equipment provider for next steps in obtaining a power scooter. 2. Hypertension, unspecified type  Assessment & Plan:  Well controlled 134/80 today. Pt was not able to obtain a home BP cuff and needs it resent. Orders:  -     Blood Pressure Monitoring (Blood Pressure Monitor Automat) KATLYN; Use Daily as Directed    3. Urinary incontinence, unspecified type  Assessment & Plan:  Pt has mixed urinary incontinence and notes a stress-incontinence pattern of leakage with straining and coughing, though also notes she will leak when getting back in bed after going to the bathroom. Also reports nocturnal enuresis. Likely multifactorial relating to glucosuria as well as body habitus and muscle atrophy.  - Rule out UTI, obtain UA  - Pending results will treat UTI or place referral to urogynecology/PT    Orders:  -     UA w Reflex to Microscopic w Reflex to Culture -Lab Collect; Future; Expected date: 2023    4.  Iron deficiency anemia, unspecified iron deficiency anemia type  Assessment & Plan:  Microcytic anemia with iron studies over the summer showing iron deficiency. - Take an iron tablet every other day for 2 months  - Will order a CBC recheck at follow up visit    Orders:  -     ferrous sulfate 324 (65 Fe) mg; Take 1 tablet (324 mg total) by mouth every other day    5. Type 2 diabetes mellitus with diabetic polyneuropathy, with long-term current use of insulin ([address])  Assessment & Plan:  Pt is not yet due for repeat A1c. Lab Results   Component Value Date    HGBA1C 9.9 (H) 08/25/2023     Reports poor sugar control, typically running high, as high as 400s. - Discussed titrating up short-acting insulin after meals to address hyperglycemia. Start with a small 5unit dose and recheck blood sugars 30min-1h after, then repeat as appropriate until blood sugar below 200. - Pressed importance of lifestyle factors in blood sugar control, for now focus on reducing consumption of sugary beverages  - Pt to follow up for diabetes visit in 1 month             Subjective      HPI  A lot of things improved since the move  Not able to use prosthetic yet due to concern about slipping on new floors. Blood sugars running high, as high as 400 sometimes too high to measure. Anemic in hospital. Feels cold and tired all day. Stress incontinence for months. No events around the time of it starting. Coughing, shifting in bed, sometimes even after going to the bathroom she gets back to bed and urinates again. Goes in her sleep and needs to wear a pad at night. No burning when she urinates. Pt denies excessive fluid intake, does have juice. Evaluation for a mobility scooter:    Mobility needs: Scooter needed to get around, transportation to stores and shopping, keeping up with her son who enjoys activity outside. Limiting symptoms and diagnoses responsible: Unable to ambulate secondary L AKA.  Pt is unable to use walker secondary difficulty in initiating a functional prosthesis. Progression of ambulation difficulty over time: Pt was ambulatory prior to amputation in March 2023, and has not been able to ambulate since. Walkable distance and assisting device: Unable to walk with any assistance device. History of falls: Pt has had falls since her amputation which occurred due to a loss of balance when attempting to stand. Why is current assist device insufficient: Current manual wheelchair is not a feasible method of distance transportation for pt. She gets tired and winded when moving herself around her house, and cannot always rely on someone else to be there to push her. What has changed to require power scooter: Pt would benefit form this since her amputation, no changes since that time. Ability to use manual wheelchair: Able to move short distances. Home setting and ability to perform ADLs: Pt is living in an apartment where she is able to perform all her ADLs. She is unable to perform IADLs especially when they involve leaving the house such as for grocery shopping. Review of Systems   Constitutional:  Positive for fatigue. Negative for chills and fever. HENT:  Negative for ear pain and sore throat. Eyes:  Negative for pain and visual disturbance. Respiratory:  Negative for cough and shortness of breath. Cardiovascular:  Negative for chest pain and palpitations. Gastrointestinal:  Negative for abdominal pain and vomiting. Genitourinary:  Positive for enuresis and urgency. Negative for dysuria and hematuria. Musculoskeletal:  Negative for arthralgias and back pain. Skin:  Negative for color change and rash. Neurological:  Negative for seizures and syncope.        Current Outpatient Medications on File Prior to Visit   Medication Sig    Advair -21 MCG/ACT inhaler Inhale 2 puffs 2 (two) times a day Rinse mouth after use    albuterol (PROVENTIL HFA,VENTOLIN HFA) 90 mcg/act inhaler INHALE 2 PUFFS EVERY 4 (FOUR) HOURS AS NEEDED FOR WHEEZING OR SHORTNESS OF BREATH    Alcohol Swabs (Pharmacist Choice Alcohol) PADS USE 3-4 TIMES AS DIRECTED.     aspirin 81 mg chewable tablet CHEW 1 TABLET (81 MG TOTAL) DAILY    atorvastatin (LIPITOR) 40 mg tablet Take 1 tablet (40 mg total) by mouth daily with dinner    Blood Glucose Monitoring Suppl (ONE TOUCH ULTRA 2) w/Device KIT BY DEVICE ROUTE 2 (TWO) TIMES A DAY CHECK BLOOD SUGARS AND RECORD VALUE AND TIME OF DAY TWICE A DAY    Comfort EZ Pen Needles 33G X 4 MM MISC USE AS DIRECTED    dulaglutide (Trulicity) 1.5 CA/7.1GJ injection AS DIRECTED WEEKLY    Global Inject Ease Lancets 30G MISC USE 3 (THREE) TIMES A DAY    hydrOXYzine HCL (ATARAX) 50 mg tablet TAKE 1 TABLET (50 MG TOTAL) BY MOUTH DAILY AT BEDTIME AS NEEDED FOR ITCHING OR ANXIETY    insulin glargine (Semglee) 100 units/mL subcutaneous injection Inject 30 Units under the skin every 12 (twelve) hours    insulin lispro (HumaLOG) 100 units/mL injection Inject 4-16 Units under the skin 3 (three) times a day before meals    Insulin Pen Needle (Comfort EZ Pen Needles) 32G X 4 MM MISC Inject under the skin 4 (four) times a day (before meals and at bedtime)    lisinopril (ZESTRIL) 20 mg tablet Take 1 tablet (20 mg total) by mouth daily    metFORMIN (GLUCOPHAGE) 1000 MG tablet TAKE ONE (1) TABLET BY MOUTH TWICE DAILY WITH FOOD    nicotine (NICODERM CQ) 21 mg/24 hr TD 24 hr patch Place 1 patch on the skin over 24 hours daily    OneTouch Ultra test strip Use 1 each daily as needed (before meals) Use as instructed    prazosin (MINIPRESS) 1 mg capsule Take 1 capsule (1 mg total) by mouth daily at bedtime    Sure Comfort Insulin Syringe 31G X 5/16" 0.3 ML MISC 2 (two) times a day Use as directed    topiramate (TOPAMAX) 25 mg tablet TAKE 1 TABLET (25 MG TOTAL) BY MOUTH 2 (TWO) TIMES A DAY    Xarelto 20 MG tablet TAKE 1 TABLET (20 MG TOTAL) BY MOUTH DAILY WITH BREAKFAST    [DISCONTINUED] Blood Pressure Monitoring (Blood Pressure Monitor Automat) KATLYN Use Daily as Directed    DULoxetine (CYMBALTA) 30 mg delayed release capsule TAKE 2 CAPSULES (60 MG TOTAL) BY MOUTH DAILY    glycerin-hypromellose- (ARTIFICIAL TEARS) 0.2-0.2-1 % SOLN Administer 2 drops to both eyes every 6 (six) hours as needed (for eye discomfort) (Patient not taking: Reported on 9/18/2023)    ketotifen (ZADITOR) 0.025 % ophthalmic solution Administer 1 drop to both eyes 2 (two) times a day as needed (for eye discomfort) (Patient not taking: Reported on 11/14/2023)       Objective     /80 (BP Location: Right arm, Patient Position: Sitting, Cuff Size: Extra-Large)   Pulse 96   Temp 97.5 °F (36.4 °C) (Tympanic)   Ht 5' 1" (1.549 m)   Wt 129 kg (283 lb 9.6 oz)   LMP 10/03/2023 (Approximate)   SpO2 98%   BMI 53.59 kg/m²     Physical Exam  Constitutional:       General: She is not in acute distress. Appearance: She is not ill-appearing. HENT:      Head: Normocephalic and atraumatic. Mouth/Throat:      Mouth: Mucous membranes are moist.      Pharynx: Oropharynx is clear. Eyes:      General:         Right eye: No discharge. Left eye: No discharge. Conjunctiva/sclera: Conjunctivae normal.   Cardiovascular:      Rate and Rhythm: Normal rate and regular rhythm. Heart sounds: No murmur heard. No friction rub. No gallop. Pulmonary:      Effort: Pulmonary effort is normal. No respiratory distress. Breath sounds: Wheezing (expiratory wheezes) present. Abdominal:      General: Bowel sounds are normal.      Palpations: Abdomen is soft. There is no mass. Tenderness: There is no abdominal tenderness. There is no right CVA tenderness, left CVA tenderness or guarding. Musculoskeletal:         General: No swelling, tenderness or deformity. Cervical back: Neck supple. No tenderness. Comments: S.p. L AKA   Lymphadenopathy:      Cervical: No cervical adenopathy. Skin:     General: Skin is warm and dry. Coloration: Skin is not jaundiced. Neurological:      Mental Status: She is alert and oriented to person, place, and time. Motor: No weakness.    Psychiatric:         Mood and Affect: Mood normal.       La Rubin MD

## 2023-11-14 NOTE — ASSESSMENT & PLAN NOTE
Microcytic anemia with iron studies over the summer showing iron deficiency.   - Take an iron tablet every other day for 2 months  - Will order a CBC recheck at follow up visit

## 2023-11-14 NOTE — LETTER
November 14, 2023     Patient: Stu Pino  YOB: 1985  Date of Visit: 11/14/2023      To Whom it May Concern:    Elma Ha is under my professional care. Em Hernandez was seen in my office on 11/14/2023. Em Hernandez has a left sided above knee amputation that causes difficulty ambulating requiring most of her day be spent in a wheelchair as well as difficulty in finding appropriate work. If you have any questions or concerns, please don't hesitate to call.          Sincerely,          La Rubin MD        CC: No Recipients

## 2023-11-14 NOTE — ASSESSMENT & PLAN NOTE
Pt has mixed urinary incontinence and notes a stress-incontinence pattern of leakage with straining and coughing, though also notes she will leak when getting back in bed after going to the bathroom. Also reports nocturnal enuresis.  Likely multifactorial relating to glucosuria as well as body habitus and muscle atrophy.  - Rule out UTI, obtain UA  - Pending results will treat UTI or place referral to urogynecology/PT

## 2023-11-14 NOTE — ASSESSMENT & PLAN NOTE
Pt has not been able to fit in her prosthesis and is working with PT to manage this. She has not been able to ambulate for this reason since her amputation in March 2023, and relies on a manual wheelchair to get around. She is able to manage in her home and able to perform all ADLs including feeding herself, bathing herself, and going to the bathroom. She is unable to perform IADLs most notably grocery shopping because she is not able to travel distances independently in a manual wheelchair secondary upper body fatigue. She is a good candidate for a power scooter for these reasons.  - Documentation this visit for power scooter. Pt to reach out to insurance and medical equipment provider for next steps in obtaining a power scooter.

## 2023-11-15 ENCOUNTER — APPOINTMENT (OUTPATIENT)
Dept: PHYSICAL THERAPY | Facility: CLINIC | Age: 38
End: 2023-11-15
Payer: COMMERCIAL

## 2023-11-17 ENCOUNTER — OFFICE VISIT (OUTPATIENT)
Dept: PHYSICAL THERAPY | Facility: CLINIC | Age: 38
End: 2023-11-17
Payer: COMMERCIAL

## 2023-11-17 DIAGNOSIS — Z89.612 S/P AKA (ABOVE KNEE AMPUTATION) UNILATERAL, LEFT (HCC): Primary | ICD-10-CM

## 2023-11-17 PROCEDURE — 97116 GAIT TRAINING THERAPY: CPT | Performed by: PHYSICAL THERAPIST

## 2023-11-17 NOTE — PROGRESS NOTES
Daily Note     Today's date: 2023  Patient name: Juliano Pacheco  : 1985  MRN: [id number]  Referring provider: ADAM Greenfield  Dx:   Encounter Diagnosis     ICD-10-CM    1. S/P AKA (above knee amputation) unilateral, left ([address])  F09.567             Start Time: 1100  Stop Time: 1140  Total time in clinic (min): 40 minutes    Subjective: Moved into new apartment, has space to walk now but has not tried using her walker. Has not worn her , still doesn't fit all the way into her socket. Glucose is still not regular, working on decreasing her sugar intake. Also anemic. She is asking for a power w/c, her current w/c brake fell off. Objective: See treatment diary below      Assessment: Patient not compliant with , communicated with her prosthetist that recasting may be the best option for proper socket fit. Focused on gait training to improve her gait quality and her endurance given that she is not fully fitting into her socket properly. Patient requiring rest breaks after approx 100ft but is able to ambulate multiple intervals in total. Instructed patient to bring glucose next session given her fluctuating glucose levels. Patient demonstrated fatigue post treatment, exhibited good technique with therapeutic exercises, and would benefit from continued PT to improve her independence with functional mobility with her prosthesis. Plan: Continue per plan of care. Precautions: L TFA, bipolar, depression, HTN, PAD    Outcome Measure 10/4 10/18   ABC 30%    PLUS-M 34.1    AMPRO  /36 K1   L-Test     2MWT  50ft with RW   Gait speed  0.30 m/s with RW   5x STS  26s without hands but does not use her prosthetic leg         POC expires Unit limit Auth Expiration date PT/OT/ST + Visit Limit?    24                            Visit/Unit Tracking  AUTH Status:  Date 10/25 11/17            Approved - 32 visits Used 3 4             Remaining  29 28                 10/4 10/18 10/25 11/17       Vitals  Outcome measures asssessment   *L-test      Patient Ed           Daily foot checks Educated          Sock ply management Educated Pre: 6  Post: 10 Pre: 5  Post: 0        Don/doff  Educated patient on how to properly don/doff prosthesis 30 min        Wear time  Recommend increasing wear time to all day if tolerated           Recommend wearing  when prosthesis is not on Recommend wearing  when prosthesis is not on        W/C    Fixed brake                  Neuro Re-Ed           Sit to stands Cuing for unlocking knee to sit          GT RW w/ wheelchair follow, supervision assist  20ft x 3  100ft x 1  Cuing for longer RLE step   RW w/ wheelchair follow, supervision assist  100ft x 10  Cuing for longer RLE step, softer step       Stairs negotiation 2 railings 4" steps  X 3  1 railing sideways 4"  X 3   Supervision to min A                                                                 Ther Ex           Aerobic conditioning           Sit to stands Educated                                                                            Ther Activity                                 Manual                                                                  Modalities

## 2023-11-21 DIAGNOSIS — E11.9 DIABETES (HCC): ICD-10-CM

## 2023-11-28 DIAGNOSIS — I73.9 PAD (PERIPHERAL ARTERY DISEASE) (HCC): ICD-10-CM

## 2023-11-28 RX ORDER — RIVAROXABAN 20 MG/1
TABLET, FILM COATED ORAL
Qty: 30 TABLET | Refills: 3 | Status: SHIPPED | OUTPATIENT
Start: 2023-11-28

## 2023-12-08 ENCOUNTER — OFFICE VISIT (OUTPATIENT)
Dept: PHYSICAL THERAPY | Facility: CLINIC | Age: 38
End: 2023-12-08
Payer: COMMERCIAL

## 2023-12-08 DIAGNOSIS — Z89.612 S/P AKA (ABOVE KNEE AMPUTATION) UNILATERAL, LEFT (HCC): Primary | ICD-10-CM

## 2023-12-08 PROCEDURE — 97530 THERAPEUTIC ACTIVITIES: CPT | Performed by: PHYSICAL THERAPIST

## 2023-12-08 NOTE — PROGRESS NOTES
Daily Note     Today's date: 2023  Patient name: Mack Mariano  : 1985  MRN: [id number]  Referring provider: XIN Ca*  Dx:   Encounter Diagnosis     ICD-10-CM    1. S/P AKA (above knee amputation) unilateral, left ([address])  P98.810           Start Time: 1000  Stop Time: 1010  Total time in clinic (min): 10 minutes    Subjective: Her phone broke and therefore was unable to call for transportation for her PT appt earlier this week. She missed her appt with "[employer] due to this reason too. Objective: See treatment diary below      Assessment: Patient arriving to PT with prosthetic leg on in a rotated position. Assisted patient with don/doff in neutral alignment. Patient's socket is too small and impairing her fit and gait. Strongly recommended calling her prosthetist today to set up an appt for new socket. After don/doff her prosthetic leg, patient reporting she is starting her menstrual cycle and unable to continue PT session due to this reason as she did not have an sanitary tampons or pads. Patient terminated PT session for this reason. Patient would benefit from continued PT to improve her independence with functional mobility with her prosthesis. Will communicate with patient's prosthetist regarding patient's socket issue. Plan: Continue per plan of care. Precautions: L TFA, bipolar, depression, HTN, PAD    Outcome Measure 10/4 10/18   ABC 30%    PLUS-M 34.1    AMPRO  21/36 K1   L-Test     2MWT  50ft with RW   Gait speed  0.30 m/s with RW   5x STS  26s without hands but does not use her prosthetic leg         POC expires Unit limit Auth Expiration date PT/OT/ST + Visit Limit?    24                            Visit/Unit Tracking  AUTH Status:  Date 10/25 11/17 12/8           Approved - 32 visits Used 3 4 5            Remaining  29 28 27                10/4 10/18 10/25 11/17 12/8      Vitals  Outcome measures asssessment     *L-test    Patient Ed Daily foot checks Educated          Sock ply management Educated Pre: 6  Post: 10 Pre: 5  Post: 0        Don/doff  Educated patient on how to properly don/doff prosthesis 30 min  Prosthetic leg was rotated and patient needed to re-don/doff      Wear time  Recommend increasing wear time to all day if tolerated           Recommend wearing  when prosthesis is not on Recommend wearing  when prosthesis is not on        W/C    Fixed brake                  Neuro Re-Ed           Sit to stands Cuing for unlocking knee to sit          GT RW w/ wheelchair follow, supervision assist  20ft x 3  100ft x 1  Cuing for longer RLE step   RW w/ wheelchair follow, supervision assist  100ft x 10  Cuing for longer RLE step, softer step       Stairs negotiation 2 railings 4" steps  X 3  1 railing sideways 4"  X 3   Supervision to min A                                                                 Ther Ex           Aerobic conditioning           Sit to stands Educated                                                                            Ther Activity                                 Manual                                                                  Modalities

## 2023-12-12 DIAGNOSIS — Z89.612 S/P AKA (ABOVE KNEE AMPUTATION) UNILATERAL, LEFT (HCC): Primary | ICD-10-CM

## 2023-12-13 ENCOUNTER — APPOINTMENT (OUTPATIENT)
Dept: PHYSICAL THERAPY | Facility: CLINIC | Age: 38
End: 2023-12-13
Payer: COMMERCIAL

## 2023-12-19 ENCOUNTER — TELEPHONE (OUTPATIENT)
Dept: FAMILY MEDICINE CLINIC | Facility: CLINIC | Age: 38
End: 2023-12-19

## 2023-12-19 DIAGNOSIS — F31.75 BIPOLAR DISORDER, IN PARTIAL REMISSION, MOST RECENT EPISODE DEPRESSED (HCC): ICD-10-CM

## 2023-12-19 DIAGNOSIS — E11.42 TYPE 2 DIABETES MELLITUS WITH DIABETIC POLYNEUROPATHY, WITH LONG-TERM CURRENT USE OF INSULIN (HCC): ICD-10-CM

## 2023-12-19 DIAGNOSIS — Z79.4 TYPE 2 DIABETES MELLITUS WITH DIABETIC POLYNEUROPATHY, WITH LONG-TERM CURRENT USE OF INSULIN (HCC): ICD-10-CM

## 2023-12-19 RX ORDER — INSULIN GLARGINE 100 [IU]/ML
INJECTION, SOLUTION SUBCUTANEOUS
Qty: 10 ML | Refills: 6 | Status: SHIPPED | OUTPATIENT
Start: 2023-12-19

## 2023-12-19 RX ORDER — DULOXETIN HYDROCHLORIDE 30 MG/1
60 CAPSULE, DELAYED RELEASE ORAL DAILY
Qty: 60 CAPSULE | Refills: 1 | Status: SHIPPED | OUTPATIENT
Start: 2023-12-19

## 2023-12-20 ENCOUNTER — APPOINTMENT (OUTPATIENT)
Dept: PHYSICAL THERAPY | Facility: CLINIC | Age: 38
End: 2023-12-20
Payer: COMMERCIAL

## 2023-12-22 ENCOUNTER — APPOINTMENT (OUTPATIENT)
Dept: PHYSICAL THERAPY | Facility: CLINIC | Age: 38
End: 2023-12-22
Payer: COMMERCIAL

## 2023-12-22 ENCOUNTER — TELEPHONE (OUTPATIENT)
Dept: PHYSICAL THERAPY | Facility: CLINIC | Age: 38
End: 2023-12-22

## 2023-12-22 NOTE — TELEPHONE ENCOUNTER
Patient called to cancel same day appointment without specific reason for cancellation. Patient was informed that given she has no-showed and late canceled multiple appointments, her future appointments would be canceled at this time. If patient receives a new socket from her prosthetist, she will considered for future appointments. However at this time, patient will not continue skilled PT due to her poor attendance. Patient verbalized her understanding.

## 2023-12-27 ENCOUNTER — APPOINTMENT (OUTPATIENT)
Dept: PHYSICAL THERAPY | Facility: CLINIC | Age: 38
End: 2023-12-27
Payer: COMMERCIAL

## 2024-01-03 DIAGNOSIS — E11.9 DIABETES (HCC): ICD-10-CM

## 2024-01-07 LAB

## 2024-01-09 ENCOUNTER — OFFICE VISIT (OUTPATIENT)
Dept: FAMILY MEDICINE CLINIC | Facility: CLINIC | Age: 39
End: 2024-01-09
Payer: COMMERCIAL

## 2024-01-09 VITALS
BODY MASS INDEX: 51.21 KG/M2 | DIASTOLIC BLOOD PRESSURE: 70 MMHG | HEART RATE: 98 BPM | SYSTOLIC BLOOD PRESSURE: 130 MMHG | WEIGHT: 271 LBS | RESPIRATION RATE: 20 BRPM | TEMPERATURE: 98.2 F | OXYGEN SATURATION: 98 %

## 2024-01-09 DIAGNOSIS — E11.42 TYPE 2 DIABETES MELLITUS WITH DIABETIC POLYNEUROPATHY, WITH LONG-TERM CURRENT USE OF INSULIN (HCC): ICD-10-CM

## 2024-01-09 DIAGNOSIS — D50.9 IRON DEFICIENCY ANEMIA, UNSPECIFIED IRON DEFICIENCY ANEMIA TYPE: ICD-10-CM

## 2024-01-09 DIAGNOSIS — Z79.4 TYPE 2 DIABETES MELLITUS WITH DIABETIC POLYNEUROPATHY, WITH LONG-TERM CURRENT USE OF INSULIN (HCC): ICD-10-CM

## 2024-01-09 DIAGNOSIS — Z89.612 S/P AKA (ABOVE KNEE AMPUTATION) UNILATERAL, LEFT (HCC): Primary | ICD-10-CM

## 2024-01-09 DIAGNOSIS — Z11.59 ENCOUNTER FOR HEPATITIS C SCREENING TEST FOR LOW RISK PATIENT: ICD-10-CM

## 2024-01-09 PROCEDURE — 99213 OFFICE O/P EST LOW 20 MIN: CPT

## 2024-01-09 NOTE — ASSESSMENT & PLAN NOTE
Patient's socket is ill fitting and a replacement socket is needed for her to continue to have a safe and functional prosthesis. Pt needs assistance in ambulating and learning to use prosthesis still.  - Replacement prosthesis has been ordered and faxed previously  - Will send for another PT referral    Pt also notes she has not heard about order for power scooter in a while, last she heard someone was working on it.  - Referral to social work to aid in coordinating complex care and medical needs

## 2024-01-09 NOTE — PROGRESS NOTES
Name: Tony Roberto      : 1985      MRN: [id number]  Encounter Provider: Mati Johnson MD  Encounter Date: 2024   Encounter department: [facility]    Assessment & Plan     1. S/P AKA (above knee amputation) unilateral, left (HCC)  Assessment & Plan:  Patient's socket is ill fitting and a replacement socket is needed for her to continue to have a safe and functional prosthesis. Pt needs assistance in ambulating and learning to use prosthesis still.  - Replacement prosthesis has been ordered and faxed previously  - Will send for another PT referral    Pt also notes she has not heard about order for power scooter in a while, last she heard someone was working on it.  - Referral to social work to aid in coordinating complex care and medical needs    Orders:  -     Ambulatory Referral to Social Work Care Management Program; Future  -     Ambulatory Referral to Physical Therapy; Future    2. Type 2 diabetes mellitus with diabetic polyneuropathy, with long-term current use of insulin ([facility])  Assessment & Plan:  Updated A1c today 9.5  Lab Results   Component Value Date    HGBA1C 9.9 (H) 2023    Sugar control not optimized, goal in this patient is below 8. Insulin regimen of 30 lantus BID and 50 humalog after meals (typically TID). She notes post-meal sugars of 3-400. Also taking trulicity 1.5mg 1/wk and metformin 1000mg QD.  - Plan for pt to check pre-meal sugars and call office with results, pt agreeable. If still elevated will plan to increase lantus to 40 units BID.  - Regular follow up in 3 months or sooner as needed. Distal RLE sensory deficit present on exam today; need for formal diabetic foot exam at next visit.    Orders:  -     Albumin / creatinine urine ratio  -     Ambulatory Referral to Ophthalmology; Future    3. Iron deficiency anemia, unspecified iron deficiency anemia type  Assessment & Plan:  Pt has chronic fatigue and has been making use of iron  supplement for iron deficiency anemia.  - Recheck hgb    Orders:  -     CBC and Platelet; Future    4. Encounter for hepatitis C screening test for low risk patient  -     Hepatitis C antibody; Future           Subjective      HPI  Notes checking blood sugars about 1h after meals is 300-400 and then doses her short acting insulin - about 50 units 3 times a day. She is using long acting insulin 30 units BID. Unsure of pre-meal sugar levels.    Sleeping almost constantly. Wakes to take her insulin, urinate, eat, drink, and smoke a cigarette. Is scheduled for a sleep study. Is taking an iron supplement. Pt takes topamax 25mg BID and Atarax 50mg PRN at night, though does not have a regular sleep schedule.    Has gained weight since AKA amputation and prosthetic does not fit. Has not yet been able to obtain a power scooter and is limited by fatigue in manual wheelchair.    Pt reports continued chronic headaches which have improved since her last visit. She is taking topamax 25mg BID at this time.  Review of Systems   Constitutional:  Positive for fatigue (somnolence). Negative for chills and fever.   HENT:  Negative for ear pain and sore throat.    Eyes:  Negative for pain and visual disturbance.   Respiratory:  Negative for cough and shortness of breath.    Cardiovascular:  Negative for chest pain and palpitations.   Gastrointestinal:  Negative for abdominal pain and vomiting.   Genitourinary:  Negative for dysuria and hematuria.   Musculoskeletal:  Negative for arthralgias and back pain.   Skin:  Negative for color change and rash.   Neurological:  Positive for headaches (chronic, improved). Negative for seizures and syncope.       Current Outpatient Medications on File Prior to Visit   Medication Sig    Advair -21 MCG/ACT inhaler Inhale 2 puffs 2 (two) times a day Rinse mouth after use    albuterol (PROVENTIL HFA,VENTOLIN HFA) 90 mcg/act inhaler INHALE 2 PUFFS EVERY 4 (FOUR) HOURS AS NEEDED FOR WHEEZING OR  SHORTNESS OF BREATH    aspirin 81 mg chewable tablet CHEW 1 TABLET (81 MG TOTAL) DAILY    atorvastatin (LIPITOR) 40 mg tablet Take 1 tablet (40 mg total) by mouth daily with dinner    dulaglutide (Trulicity) 1.5 MG/0.5ML injection AS DIRECTED WEEKLY    DULoxetine (CYMBALTA) 30 mg delayed release capsule TAKE 2 CAPSULES (60 MG TOTAL) BY MOUTH DAILY    ferrous sulfate 324 (65 Fe) mg Take 1 tablet (324 mg total) by mouth every other day    glycerin-hypromellose- (ARTIFICIAL TEARS) 0.2-0.2-1 % SOLN Administer 2 drops to both eyes every 6 (six) hours as needed (for eye discomfort)    insulin lispro (HumaLOG) 100 units/mL injection Inject 4-16 Units under the skin 3 (three) times a day before meals (Patient taking differently: Inject 4-16 Units under the skin 3 (three) times a day before meals Patient taking 45 units per meal bs in the 300 range)    ketotifen (ZADITOR) 0.025 % ophthalmic solution Administer 1 drop to both eyes 2 (two) times a day as needed (for eye discomfort)    Lantus 100 UNIT/ML subcutaneous injection INJECT 30 UNITS UNDER THE SKIN EVERY 12 (TWELVE) HOURS    lisinopril (ZESTRIL) 20 mg tablet Take 1 tablet (20 mg total) by mouth daily    metFORMIN (GLUCOPHAGE) 1000 MG tablet TAKE ONE (1) TABLET BY MOUTH TWICE DAILY WITH FOOD    nicotine (NICODERM CQ) 21 mg/24 hr TD 24 hr patch Place 1 patch on the skin over 24 hours daily    prazosin (MINIPRESS) 1 mg capsule Take 1 capsule (1 mg total) by mouth daily at bedtime    rivaroxaban (Xarelto) 20 mg tablet TAKE 1 TABLET (20 MG TOTAL) BY MOUTH DAILY WITH BREAKFAST    Alcohol Swabs (Pharmacist Choice Alcohol) PADS USE 3-4 TIMES AS DIRECTED.    Blood Glucose Monitoring Suppl (ONE TOUCH ULTRA 2) w/Device KIT BY DEVICE ROUTE 2 (TWO) TIMES A DAY CHECK BLOOD SUGARS AND RECORD VALUE AND TIME OF DAY TWICE A DAY    Blood Pressure Monitoring (Blood Pressure Monitor Automat) KATLYN Use Daily as Directed    Comfort EZ Pen Needles 33G X 4 MM MISC USE AS DIRECTED     "Global Inject Ease Lancets 30G MISC USE 3 (THREE) TIMES A DAY    hydrOXYzine HCL (ATARAX) 50 mg tablet TAKE 1 TABLET (50 MG TOTAL) BY MOUTH DAILY AT BEDTIME AS NEEDED FOR ITCHING OR ANXIETY    Insulin Pen Needle (Comfort EZ Pen Needles) 32G X 4 MM MISC Inject under the skin 4 (four) times a day (before meals and at bedtime)    OneTouch Ultra test strip Use 1 each daily as needed (before meals) Use as instructed    Sure Comfort Insulin Syringe 31G X 5/16\" 0.3 ML MISC 2 (two) times a day Use as directed    topiramate (TOPAMAX) 25 mg tablet TAKE 1 TABLET (25 MG TOTAL) BY MOUTH 2 (TWO) TIMES A DAY       Objective     /70 (BP Location: Right arm, Patient Position: Sitting, Cuff Size: Extra-Large)   Pulse 98   Temp 98.2 °F (36.8 °C)   Resp 20   Wt 123 kg (271 lb) Comment: did have to hold due to amputation if weight discrepancy  SpO2 98%   BMI 51.21 kg/m²     Physical Exam  Constitutional:       General: She is not in acute distress.     Appearance: She is not ill-appearing.   HENT:      Head: Normocephalic and atraumatic.      Mouth/Throat:      Mouth: Mucous membranes are moist.      Pharynx: Oropharynx is clear.   Eyes:      General:         Right eye: No discharge.         Left eye: No discharge.      Conjunctiva/sclera: Conjunctivae normal.      Pupils: Pupils are equal, round, and reactive to light.   Cardiovascular:      Rate and Rhythm: Normal rate and regular rhythm.      Heart sounds: Normal heart sounds.   Pulmonary:      Effort: Pulmonary effort is normal. No respiratory distress.      Breath sounds: Normal breath sounds.   Abdominal:      General: There is no distension.      Palpations: Abdomen is soft.      Tenderness: There is no right CVA tenderness, left CVA tenderness or guarding.   Musculoskeletal:         General: No swelling, tenderness or deformity.      Cervical back: Neck supple. No tenderness.      Comments: S.p. L AKA amputation   Lymphadenopathy:      Cervical: No cervical " adenopathy.   Skin:     General: Skin is warm and dry.      Coloration: Skin is not jaundiced.   Neurological:      Mental Status: She is alert and oriented to person, place, and time.      Sensory: Sensory deficit (pt cannot feel palpation at and distal to R ankle) present.   Psychiatric:         Mood and Affect: Mood normal.       Mati Johnson MD

## 2024-01-09 NOTE — ASSESSMENT & PLAN NOTE
Pt has chronic fatigue and has been making use of iron supplement for iron deficiency anemia.  - Recheck hgb

## 2024-01-09 NOTE — ASSESSMENT & PLAN NOTE
Updated A1c today 9.5  Lab Results   Component Value Date    HGBA1C 9.9 (H) 08/25/2023    Sugar control not optimized, goal in this patient is below 8. Insulin regimen of 30 lantus BID and 50 humalog after meals (typically TID). She notes post-meal sugars of 3-400. Also taking trulicity 1.5mg 1/wk and metformin 1000mg QD.  - Plan for pt to check pre-meal sugars and call office with results, pt agreeable. If still elevated will plan to increase lantus to 40 units BID.  - Regular follow up in 3 months or sooner as needed. Distal RLE sensory deficit present on exam today; need for formal diabetic foot exam at next visit.

## 2024-01-13 DIAGNOSIS — E11.42 TYPE 2 DIABETES MELLITUS WITH DIABETIC POLYNEUROPATHY, WITH LONG-TERM CURRENT USE OF INSULIN (HCC): ICD-10-CM

## 2024-01-13 DIAGNOSIS — Z79.4 TYPE 2 DIABETES MELLITUS WITH DIABETIC POLYNEUROPATHY, WITH LONG-TERM CURRENT USE OF INSULIN (HCC): ICD-10-CM

## 2024-01-15 RX ORDER — DULAGLUTIDE 1.5 MG/.5ML
INJECTION, SOLUTION SUBCUTANEOUS
Qty: 2 ML | Refills: 2 | Status: SHIPPED | OUTPATIENT
Start: 2024-01-15

## 2024-01-16 ENCOUNTER — PATIENT OUTREACH (OUTPATIENT)
Dept: FAMILY MEDICINE CLINIC | Facility: CLINIC | Age: 39
End: 2024-01-16

## 2024-01-16 NOTE — PROGRESS NOTES
[facility] SW received referral for patient as per chart review someone had been working on ordering a power scooter for her.  Per chart review, previously [facility] SW had also provided patient with several resources including housing, however it does not appear power scooter was ordered through [facility] department.    [facility] SW reached out to patient regarding the above and call was not able to be completed at this time.    [facility] SW will attempt another outreach within 1-2 weeks

## 2024-01-19 ENCOUNTER — PATIENT OUTREACH (OUTPATIENT)
Dept: FAMILY MEDICINE CLINIC | Facility: CLINIC | Age: 39
End: 2024-01-19

## 2024-01-19 NOTE — LETTER
[address]  [facility] [address]  [phone]    Re: Unable to Reach   1/19/2024       Dear Tony,    I am a [facility]  and wanted to be certain you had information to contact me should you desire assistance with or have questions about non-medical aspects of your care such as [but not limited to] medical insurance, housing, transportation, material needs, or emergency needs.  If I do not have an answer I will assist you in finding the appropriate agency or individual who can help.      Please feel free to contact me at [phone]. Thank You.    Sincerely,         CHRISTINE Jurado

## 2024-01-19 NOTE — PROGRESS NOTES
[facility] SW attempted second outreach to patient for f/u regarding previous DME ordered.  Call could not be completed.  [facility] SW sent UTR letter to patient's [facility]

## 2024-01-30 DIAGNOSIS — F31.75 BIPOLAR DISORDER, IN PARTIAL REMISSION, MOST RECENT EPISODE DEPRESSED (HCC): ICD-10-CM

## 2024-01-30 RX ORDER — DULOXETIN HYDROCHLORIDE 30 MG/1
60 CAPSULE, DELAYED RELEASE ORAL DAILY
Qty: 60 CAPSULE | Refills: 1 | Status: SHIPPED | OUTPATIENT
Start: 2024-01-30

## 2024-02-07 ENCOUNTER — TELEMEDICINE (OUTPATIENT)
Dept: NEUROLOGY | Facility: CLINIC | Age: 39
End: 2024-02-07
Payer: COMMERCIAL

## 2024-02-07 DIAGNOSIS — Z89.612 S/P AKA (ABOVE KNEE AMPUTATION) UNILATERAL, LEFT (HCC): Primary | ICD-10-CM

## 2024-02-07 PROCEDURE — 99214 OFFICE O/P EST MOD 30 MIN: CPT | Performed by: PHYSICAL MEDICINE & REHABILITATION

## 2024-02-07 NOTE — PROGRESS NOTES
Virtual Regular Visit    Verification of patient location:    Patient is located at Home in the following state in which I hold an active license [place]      Assessment/Plan:    L AKA:  Has her initial prosthetic however cannot fit into her socket - most likely requires new socket and new liner.  Patient is seeing her prosthetist to have this looked at.    Wheelchair wear and tear:   Patient received this from [employer] and will need to contact this company regarding replacing parts or repairing parts.      Motorized scooter for in the home:   Will reach out to office  to see if this is possible via insurance.      RTC in 4 months or sooner if needed    Problem List Items Addressed This Visit          Other    S/P AKA (above knee amputation) unilateral, left (HCC) - Primary    Relevant Orders    Prosthetic       Reason for visit is No chief complaint on file.       Encounter provider Fabienne Augustine MD    Provider located at [address] [place] [address] [facility] [address] [address]      Recent Visits  Date Type Provider Dept   02/07/24 Telemedicine Fabienne Augustine MD [facility]   Showing recent visits within past 7 days and meeting all other requirements  Future Appointments  No visits were found meeting these conditions.  Showing future appointments within next 150 days and meeting all other requirements       The patient was identified by name and date of birth. Tony HEAVEN CrPardeep was informed that this is a telemedicine visit and that the visit is being conducted through the Epic Embedded platform. She agrees to proceed..  My office door was closed. No one else was in the room.  She acknowledged consent and understanding of privacy and security of the video platform. The patient has agreed to participate and understands they can discontinue the visit at any time.    Patient is aware this is a billable service.     Subjective  Tony COLLADO  Pardeep is a 38 y.o. female s/p left transfemoral amputation (L AKA).      Patient is s/p left transfemoral amputation (L AKA) on 3/29/23 by Dr. Langford (Vascular) secondary to complications related to PAD    Patient is working with [employer].  She received a K2 prosthesis several months ago.  She initially went to outpatient PT, however developed pain and ill fit, therefore was placed on therapy HOLD.  She is in process of getting evaluated for a new socket.      Today she is also interested in getting help with a motorized scooter for in the home.  Her wheelchair has a lot of wear and tear additionally and needs assistance in getting this repaired.     Incision: well healed  Residual limb pain: none  Phantom limb sensations or pain: Yes intermittent sensations  Falls: None  Health of contralateral limb: Has some swelling of the right lower leg    Function:  Prior level of function: Independent without AD in mobility or self care  Current level of function: Mod I at a wheelchair level with mobility, transfers, self care including dressing, bathing with shower chair, and toileting. She is NOT using her prosthesis due to severe pain.   Goals: To Walk!  Motivation/family support:  Has strong desire and motivation to ambulate with a prosthesis    Medically stable.      Mood/Cognition: Fair      Socket history expanded:  Lives in [place] with her wife in a handicapped 1st floor apartment.    Not currently working      Past Medical History:   Diagnosis Date    Asthma     Atherosclerosis     Bipolar 1 disorder (HCC)     COPD (chronic obstructive pulmonary disease) (HCC)     Depression     Diabetes mellitus (HCC)     Hypertension     Psychiatric disorder     cutting history    PTSD (post-traumatic stress disorder)     Schizoaffective disorder (HCC)     Tendonitis        Past Surgical History:   Procedure Laterality Date    AMPUTATION ABOVE KNEE (AKA) Left 3/29/2023    Procedure: AMPUTATION ABOVE KNEE (AKA);   Surgeon: Savi Langford MD;  Location: [facility];  Service: Vascular    BYPASS FEMORAL-POPLITEAL Left 2021    Procedure: Left Common Femoral Below Knee to Popliteal Bypass with Insitu GSV graft.  Left Lower Extremity Angiogram;  Surgeon: Savi Langford MD;  Location: [facility];  Service: Vascular     SECTION      EAR SURGERY      IR LOWER EXTREMITY ANGIOGRAM  2021    IR LOWER EXTREMITY ANGIOGRAM  2021    AK SLCTV CATHJ 3RD+ ORD SLCTV ABDL PEL/LXTR BRNCH Left 3/24/2023    Procedure: Left leg angiogram;  Surgeon: Byron Wahl DO;  Location: [facility];  Service: Vascular    TUBAL LIGATION         Current Outpatient Medications   Medication Sig Dispense Refill    Advair -21 MCG/ACT inhaler Inhale 2 puffs 2 (two) times a day Rinse mouth after use 12 g 1    albuterol (PROVENTIL HFA,VENTOLIN HFA) 90 mcg/act inhaler INHALE 2 PUFFS EVERY 4 (FOUR) HOURS AS NEEDED FOR WHEEZING OR SHORTNESS OF BREATH 8.5 g 5    Alcohol Swabs (Pharmacist Choice Alcohol) PADS USE 3-4 TIMES AS DIRECTED. 100 each 3    aspirin 81 mg chewable tablet CHEW 1 TABLET (81 MG TOTAL) DAILY 30 tablet 2    atorvastatin (LIPITOR) 40 mg tablet Take 1 tablet (40 mg total) by mouth daily with dinner 90 tablet 3    Blood Glucose Monitoring Suppl (ONE TOUCH ULTRA 2) w/Device KIT BY DEVICE ROUTE 2 (TWO) TIMES A DAY CHECK BLOOD SUGARS AND RECORD VALUE AND TIME OF DAY TWICE A DAY 1 kit 2    Comfort EZ Pen Needles 33G X 4 MM MISC USE AS DIRECTED 300 each 0    DULoxetine (CYMBALTA) 30 mg delayed release capsule TAKE 2 CAPSULES (60 MG TOTAL) BY MOUTH DAILY 60 capsule 1    Global Inject Ease Lancets 30G MISC USE 3 (THREE) TIMES A  each 0    glycerin-hypromellose- (ARTIFICIAL TEARS) 0.2-0.2-1 % SOLN Administer 2 drops to both eyes every 6 (six) hours as needed (for eye discomfort) 30 mL 1    hydrOXYzine HCL (ATARAX) 50 mg tablet TAKE 1 TABLET (50 MG TOTAL) BY MOUTH DAILY AT BEDTIME AS NEEDED FOR ITCHING OR ANXIETY 30 tablet 0  "   insulin lispro (HumaLOG) 100 units/mL injection Inject 4-16 Units under the skin 3 (three) times a day before meals (Patient taking differently: Inject 4-16 Units under the skin 3 (three) times a day before meals Patient taking 45 units per meal bs in the 300 range) 10 mL 6    Insulin Pen Needle (Comfort EZ Pen Needles) 32G X 4 MM MISC Inject under the skin 4 (four) times a day (before meals and at bedtime) 120 each 5    ketotifen (ZADITOR) 0.025 % ophthalmic solution Administer 1 drop to both eyes 2 (two) times a day as needed (for eye discomfort) 5 mL 0    Lantus 100 UNIT/ML subcutaneous injection INJECT 30 UNITS UNDER THE SKIN EVERY 12 (TWELVE) HOURS 10 mL 6    lisinopril (ZESTRIL) 20 mg tablet Take 1 tablet (20 mg total) by mouth daily 90 tablet 3    metFORMIN (GLUCOPHAGE) 1000 MG tablet TAKE ONE (1) TABLET BY MOUTH TWICE DAILY WITH FOOD 60 tablet 1    OneTouch Ultra test strip Use 1 each daily as needed (before meals) Use as instructed 200 each 2    prazosin (MINIPRESS) 1 mg capsule Take 1 capsule (1 mg total) by mouth daily at bedtime 90 capsule 3    rivaroxaban (Xarelto) 20 mg tablet TAKE 1 TABLET (20 MG TOTAL) BY MOUTH DAILY WITH BREAKFAST 30 tablet 3    Sure Comfort Insulin Syringe 31G X 5/16\" 0.3 ML MISC 2 (two) times a day Use as directed      topiramate (TOPAMAX) 25 mg tablet TAKE 1 TABLET (25 MG TOTAL) BY MOUTH 2 (TWO) TIMES A DAY 60 tablet 1    Trulicity 1.5 MG/0.5ML injection AS DIRECTED WEEKLY 2 mL 2    Blood Pressure Monitoring (Blood Pressure Monitor Automat) KATLYN Use Daily as Directed (Patient not taking: Reported on 2/7/2024) 1 each 0    ferrous sulfate 324 (65 Fe) mg Take 1 tablet (324 mg total) by mouth every other day (Patient not taking: Reported on 2/7/2024) 30 tablet 0    nicotine (NICODERM CQ) 21 mg/24 hr TD 24 hr patch Place 1 patch on the skin over 24 hours daily (Patient not taking: Reported on 2/7/2024) 28 patch 3     No current facility-administered medications for this visit.      "   No Known Allergies    Review of Systems   Constitutional: Negative.    HENT: Negative.     Eyes: Negative.    Respiratory: Negative.     Cardiovascular: Negative.    Gastrointestinal: Negative.    Endocrine: Negative.    Genitourinary: Negative.    Musculoskeletal: Negative.    Skin: Negative.    Allergic/Immunologic: Negative.    Neurological: Negative.    Hematological: Negative.    Psychiatric/Behavioral: Negative.         Video Exam    There were no vitals filed for this visit.    Physical Exam  Vitals and nursing note reviewed.   Constitutional:       General: She is not in acute distress.     Appearance: She is well-developed.   HENT:      Head: Normocephalic.      Nose: Nose normal.   Eyes:      Conjunctiva/sclera: Conjunctivae normal.   Pulmonary:      Effort: Pulmonary effort is normal.      Breath sounds: No wheezing.   Abdominal:      General: There is no distension.      Palpations: Abdomen is soft.   Musculoskeletal:         General: No swelling.      Cervical back: Neck supple.      Comments: Residual limb cylindrical in shape   Very little edema  ROM of hip appears function   Skin:     General: Skin is warm.   Neurological:      Mental Status: She is alert and oriented to person, place, and time.   Psychiatric:         Mood and Affect: Mood normal.          Visit Time  Total Visit Duration: 45 minutes

## 2024-02-12 ENCOUNTER — TELEPHONE (OUTPATIENT)
Dept: NEUROLOGY | Facility: CLINIC | Age: 39
End: 2024-02-12

## 2024-02-12 NOTE — TELEPHONE ENCOUNTER
Communication sent to Criss Carrizales PT from [employer]. Awaiting to learn if we can pursue motorized scooter.

## 2024-02-12 NOTE — TELEPHONE ENCOUNTER
Spoke with patient per Dr. Augustine to provide the phone number for [employer] in regards to getting the patient's wheelchair fixed. [phone]

## 2024-02-12 NOTE — TELEPHONE ENCOUNTER
----- Message from Fabienne Augustine MD sent at 2/12/2024  4:02 PM EST -----  Mira - can you send note to [employer] and give Tony the [employer] phone number to ask about getting her wheelchair fixed:  Their number I believe is [phone]    Mehreen - do you think this patient who just got a wheelchair this past year can get a motorized scooter for her home.  She has an above the knee amputation.    I remember this can be an issue.  I think she has [employer].    Dr. Augustine

## 2024-02-14 DIAGNOSIS — E11.42 TYPE 2 DIABETES MELLITUS WITH DIABETIC POLYNEUROPATHY, WITH LONG-TERM CURRENT USE OF INSULIN (HCC): ICD-10-CM

## 2024-02-14 DIAGNOSIS — Z79.4 TYPE 2 DIABETES MELLITUS WITH DIABETIC POLYNEUROPATHY, WITH LONG-TERM CURRENT USE OF INSULIN (HCC): ICD-10-CM

## 2024-02-14 NOTE — TELEPHONE ENCOUNTER
Discussion with Criss Carrizales from [employer] who informed that it is possible for pt to have a motorized scooter. Insurance will just need information about why pt needs a scooter and is not able to use the wc.   She also would like to know how long ago pt purchased the wc and she probably will have to pay it off before sending scooter request to insurance company.     MSW phoned [facility] [address] and [facility] to obtain more info about days/times of wc clinic.   Awaiting on a call back.

## 2024-02-16 RX ORDER — INSULIN LISPRO 100 [IU]/ML
4-16 INJECTION, SOLUTION INTRAVENOUS; SUBCUTANEOUS
Qty: 10 ML | Refills: 6 | Status: SHIPPED | OUTPATIENT
Start: 2024-02-16

## 2024-02-16 NOTE — TELEPHONE ENCOUNTER
Thanks Dr. Augustine,   Pt informed that she would like to get the process started and is agreeable to go to [facility]. She shared that she received the manual wc last year. She is aware that the provider will have to document why she need a scooter.   If agreeable to start this process with [facility], could you please place order for wc clinic eval ? Thanks

## 2024-02-16 NOTE — TELEPHONE ENCOUNTER
Hi Dr. Augustine,    I had a discussion with Criss Carrizales from [employer] who informed that it is possible for pt to have a motorized scooter. Insurance will just need information about why pt needs a motorized scooter and is not able to use the wc. At home. They will need a f2f explaining this in detail.     If pt received a wc this year, most likely she will have to purchase the wc if she is still making payments before sending scooter request to insurance company.     [facility] can do the evaluation if pt is interested in this option. Thanks

## 2024-02-19 DIAGNOSIS — Z89.512 S/P BKA (BELOW KNEE AMPUTATION) UNILATERAL, LEFT (HCC): Primary | ICD-10-CM

## 2024-02-19 NOTE — TELEPHONE ENCOUNTER
Yes I will order it in [facility] and it can be faxed over there for evaluation.  We can try a note post clinic eval correct?  Dr. ortiz

## 2024-02-20 DIAGNOSIS — I73.9 PAD (PERIPHERAL ARTERY DISEASE) (HCC): ICD-10-CM

## 2024-02-20 DIAGNOSIS — E11.9 DIABETES (HCC): ICD-10-CM

## 2024-02-22 RX ORDER — ASPIRIN 81 MG/1
81 TABLET, CHEWABLE ORAL DAILY
Qty: 30 TABLET | Refills: 2 | Status: SHIPPED | OUTPATIENT
Start: 2024-02-22

## 2024-02-22 NOTE — TELEPHONE ENCOUNTER
Dr. Augustine,    I spoke with Gracie Darnell from [employer]. She informed that with patient's insurance she will need a f2f eval. This can either happen before or after the evaluation with [facility].   Please advise if agreeable to see pt for a f2f and if I can connect her to the [facility]  clinic thanks

## 2024-03-05 DIAGNOSIS — Z89.612 S/P AKA (ABOVE KNEE AMPUTATION) UNILATERAL, LEFT (HCC): Primary | ICD-10-CM

## 2024-03-19 DIAGNOSIS — I73.9 PAD (PERIPHERAL ARTERY DISEASE) (HCC): ICD-10-CM

## 2024-03-19 DIAGNOSIS — Z79.4 TYPE 2 DIABETES MELLITUS WITH DIABETIC POLYNEUROPATHY, WITH LONG-TERM CURRENT USE OF INSULIN (HCC): ICD-10-CM

## 2024-03-19 DIAGNOSIS — F31.75 BIPOLAR DISORDER, IN PARTIAL REMISSION, MOST RECENT EPISODE DEPRESSED (HCC): ICD-10-CM

## 2024-03-19 DIAGNOSIS — E11.42 TYPE 2 DIABETES MELLITUS WITH DIABETIC POLYNEUROPATHY, WITH LONG-TERM CURRENT USE OF INSULIN (HCC): ICD-10-CM

## 2024-03-19 RX ORDER — DULOXETIN HYDROCHLORIDE 30 MG/1
60 CAPSULE, DELAYED RELEASE ORAL DAILY
Qty: 60 CAPSULE | Refills: 1 | Status: SHIPPED | OUTPATIENT
Start: 2024-03-19

## 2024-03-19 RX ORDER — INSULIN GLARGINE 100 [IU]/ML
INJECTION, SOLUTION SUBCUTANEOUS
Qty: 10 ML | Refills: 6 | Status: SHIPPED | OUTPATIENT
Start: 2024-03-19

## 2024-03-19 RX ORDER — RIVAROXABAN 20 MG/1
TABLET, FILM COATED ORAL
Qty: 30 TABLET | Refills: 3 | Status: SHIPPED | OUTPATIENT
Start: 2024-03-19

## 2024-03-20 ENCOUNTER — TELEPHONE (OUTPATIENT)
Dept: FAMILY MEDICINE CLINIC | Facility: CLINIC | Age: 39
End: 2024-03-20

## 2024-03-20 NOTE — TELEPHONE ENCOUNTER
Patient called and stated that she spoke with the pharmacy and they do not have her Trulicity, it is on back order. She is concerned due to having to take it today. She is wondering what she should do

## 2024-03-22 DIAGNOSIS — E11.42 TYPE 2 DIABETES MELLITUS WITH DIABETIC POLYNEUROPATHY, WITH LONG-TERM CURRENT USE OF INSULIN (HCC): ICD-10-CM

## 2024-03-22 DIAGNOSIS — Z79.4 TYPE 2 DIABETES MELLITUS WITH DIABETIC POLYNEUROPATHY, WITH LONG-TERM CURRENT USE OF INSULIN (HCC): ICD-10-CM

## 2024-03-24 RX ORDER — DULAGLUTIDE 1.5 MG/.5ML
INJECTION, SOLUTION SUBCUTANEOUS
Qty: 2 ML | Refills: 2 | Status: SHIPPED | OUTPATIENT
Start: 2024-03-24

## 2024-03-25 ENCOUNTER — TELEPHONE (OUTPATIENT)
Dept: FAMILY MEDICINE CLINIC | Facility: CLINIC | Age: 39
End: 2024-03-25

## 2024-03-25 NOTE — TELEPHONE ENCOUNTER
Patient requested that a letter be written in regards to her electric not being shut off. She stated that it needs to state that her electric can not be shut off due to illness in the household    Patient stated that she is also sending over something to our fax machine for provider to fill out.     When completed please fax to [employer] at [phone]      [facility] ed   Account # [id number]

## 2024-03-28 DIAGNOSIS — Z78.9 IMPAIRED MOBILITY AND ACTIVITIES OF DAILY LIVING: ICD-10-CM

## 2024-03-28 DIAGNOSIS — Z89.612 S/P AKA (ABOVE KNEE AMPUTATION) UNILATERAL, LEFT (HCC): Primary | ICD-10-CM

## 2024-03-28 DIAGNOSIS — Z74.09 IMPAIRED MOBILITY AND ACTIVITIES OF DAILY LIVING: ICD-10-CM

## 2024-03-28 NOTE — TELEPHONE ENCOUNTER
Faxed over the completed paperwork to [employer], fax came back that it went through and it was scanned into the chart.

## 2024-03-29 ENCOUNTER — TELEPHONE (OUTPATIENT)
Dept: INTERNAL MEDICINE CLINIC | Facility: CLINIC | Age: 39
End: 2024-03-29

## 2024-03-29 NOTE — TELEPHONE ENCOUNTER
Please review Trulicity is only available for a dose lower than 1.5 mg according to the patient. She has not had her trulicity for 3 weeks due to  back order Is there another lower dose we can provide for this patient. Patient states it was sent in for the same dose twice now. If the attending can please review and address. Thank you

## 2024-04-02 DIAGNOSIS — E11.42 TYPE 2 DIABETES MELLITUS WITH DIABETIC POLYNEUROPATHY, WITH LONG-TERM CURRENT USE OF INSULIN (HCC): ICD-10-CM

## 2024-04-02 DIAGNOSIS — E11.9 DIABETES (HCC): ICD-10-CM

## 2024-04-02 DIAGNOSIS — Z79.4 TYPE 2 DIABETES MELLITUS WITH DIABETIC POLYNEUROPATHY, WITH LONG-TERM CURRENT USE OF INSULIN (HCC): ICD-10-CM

## 2024-04-02 RX ORDER — DULAGLUTIDE 0.75 MG/.5ML
0.75 INJECTION, SOLUTION SUBCUTANEOUS
Qty: 6 ML | Refills: 1 | Status: SHIPPED | OUTPATIENT
Start: 2024-04-02

## 2024-04-04 DIAGNOSIS — E11.42 TYPE 2 DIABETES MELLITUS WITH DIABETIC POLYNEUROPATHY, WITH LONG-TERM CURRENT USE OF INSULIN (HCC): ICD-10-CM

## 2024-04-04 DIAGNOSIS — Z79.4 TYPE 2 DIABETES MELLITUS WITH DIABETIC POLYNEUROPATHY, WITH LONG-TERM CURRENT USE OF INSULIN (HCC): ICD-10-CM

## 2024-04-04 RX ORDER — LANCETS 33 GAUGE
EACH MISCELLANEOUS
Qty: 120 EACH | Refills: 5 | Status: SHIPPED | OUTPATIENT
Start: 2024-04-04

## 2024-04-25 DIAGNOSIS — Z79.4 TYPE 2 DIABETES MELLITUS WITH DIABETIC POLYNEUROPATHY, WITH LONG-TERM CURRENT USE OF INSULIN (HCC): ICD-10-CM

## 2024-04-25 DIAGNOSIS — E11.42 TYPE 2 DIABETES MELLITUS WITH DIABETIC POLYNEUROPATHY, WITH LONG-TERM CURRENT USE OF INSULIN (HCC): ICD-10-CM

## 2024-04-26 DIAGNOSIS — E11.9 DIABETES (HCC): ICD-10-CM

## 2024-04-26 DIAGNOSIS — I10 HYPERTENSION: ICD-10-CM

## 2024-04-26 RX ORDER — LANCETS 33 GAUGE
EACH MISCELLANEOUS
Qty: 120 EACH | Refills: 5 | Status: SHIPPED | OUTPATIENT
Start: 2024-04-26

## 2024-04-26 RX ORDER — SYRING-NEEDL,DISP,INSUL,0.3 ML 28GX1/2"
SYRINGE, EMPTY DISPOSABLE MISCELLANEOUS
Qty: 100 EACH | Refills: 5 | Status: SHIPPED | OUTPATIENT
Start: 2024-04-26

## 2024-04-26 NOTE — DISCHARGE INSTR - AVS FIRST PAGE
DISCHARGE INSTRUCTIONS  AMPUTATION    REHABILITATION:   You will require some form of rehabilitation after this procedure  This will assist with your return to daily activities by incorporating exercise and balance training  This may be in the form of visiting nurses and physical therapy in your home or more commonly, admission to a rehabilitation facility for a period of time before you return home  ACTIVITY:  You cannot put any weight on the bottom of your residual limb  Physical therapy and rehab staff will direct progression of your activity based on your progress  Please follow their recommendations closely  It is incredibly important to avoid falling on your residual limb as this can cause bleeding and the wound to open up  Your surgeon will decide when you are ready to be referred to the prosthetist for a fitting  This will occur sometime after your 4-week appointment and often later  DIET:  Resume your normal diet  Good nutrition is important for healing of your incision  INCISION:  Wash incision daily with soap and water and thoroughly pat dry  Apply clean, dry gauze and an ACE wrap daily to decrease swelling and get your stump ready to be fit for a prosthetic  It is normal to have swelling or discoloration around the incision  If increasing redness or pain develops, call our office immediately  You will have stitches or staples and these will be evaluated for removal at your first post-operative visit around 4 weeks after surgery  If any of your incisions are open and require dressing changes, you will be given instructions for your daily incision care  If you are not able to change the dressings, a visiting nurse will be arranged  Do not bathe in a tub or swim until your incision is completely healed  DO NOT put any powders, creams, ointments, or lotions on your incision      FOLLOW UP APPOINTMENTS:  Making and keeping follow up appointments and ultrasound tests are important to your Goals:  Nutrition Goal: Make sure adding ~ 200-250 calorie snack mid afternoon so adequate nutrition before running.  Also consider bumping up calories for breakfast  Water Goal: I will continue at least 64 oz or greater each day  Exercise Goal:  Strength training twice a week and cardio ~ 3-4 days a week  Increase Equate Multivitamin Complete for adults to two in morning       recovery  If you have difficulty making it to or keeping your follow up appointments, call the office  If you have increased pain, fever >101 5, increased drainage, redness or a bad smell at your surgery site, new coldness/numbness of your arm or leg, please call us immediately and GO directly to the ER  Ariellat denisha/ Sera Kim PA-C: [facility]      PLEASE CALL THE OFFICE IF [place]  [phone]  [phone]  [address] [facility] , [address] [facility], [address]  [address], [address], [place], [address] [facility] [address]  [address], [facility], [address] [place] [address]  [address] [place] [address], [address] [facility], [facility], [address]    [address] Ver[address], [address], Guillermina Bess [address]  [facility] 19, [address] [facility], [address] [id number] [facility]  [address] [facility], [address] [facility], [address]  One [facility], [address] [facility], One [facility],[address], Josie Mcrae 6  [address] [facility], [address]  [address] [place], [place] [address]

## 2024-04-27 RX ORDER — PRAZOSIN HYDROCHLORIDE 1 MG/1
CAPSULE ORAL
Qty: 90 CAPSULE | Refills: 1 | Status: SHIPPED | OUTPATIENT
Start: 2024-04-27

## 2024-04-30 DIAGNOSIS — F31.75 BIPOLAR DISORDER, IN PARTIAL REMISSION, MOST RECENT EPISODE DEPRESSED (HCC): ICD-10-CM

## 2024-05-01 RX ORDER — DULOXETIN HYDROCHLORIDE 30 MG/1
60 CAPSULE, DELAYED RELEASE ORAL DAILY
Qty: 60 CAPSULE | Refills: 1 | Status: SHIPPED | OUTPATIENT
Start: 2024-05-01

## 2024-05-28 DIAGNOSIS — Z89.612 S/P AKA (ABOVE KNEE AMPUTATION) UNILATERAL, LEFT (HCC): Primary | ICD-10-CM

## 2024-05-29 DIAGNOSIS — Z79.4 TYPE 2 DIABETES MELLITUS WITH DIABETIC POLYNEUROPATHY, WITH LONG-TERM CURRENT USE OF INSULIN (HCC): ICD-10-CM

## 2024-05-29 DIAGNOSIS — E11.42 TYPE 2 DIABETES MELLITUS WITH DIABETIC POLYNEUROPATHY, WITH LONG-TERM CURRENT USE OF INSULIN (HCC): ICD-10-CM

## 2024-05-29 DIAGNOSIS — I73.9 PAD (PERIPHERAL ARTERY DISEASE) (HCC): ICD-10-CM

## 2024-05-30 RX ORDER — INSULIN LISPRO 100 [IU]/ML
4-16 INJECTION, SOLUTION INTRAVENOUS; SUBCUTANEOUS
Qty: 10 ML | Refills: 6 | Status: SHIPPED | OUTPATIENT
Start: 2024-05-30

## 2024-05-30 RX ORDER — RIVAROXABAN 20 MG/1
TABLET, FILM COATED ORAL
Qty: 30 TABLET | Refills: 3 | Status: SHIPPED | OUTPATIENT
Start: 2024-05-30

## 2024-06-06 DIAGNOSIS — I73.9 PAD (PERIPHERAL ARTERY DISEASE) (HCC): ICD-10-CM

## 2024-06-07 DIAGNOSIS — E11.42 TYPE 2 DIABETES MELLITUS WITH DIABETIC POLYNEUROPATHY, WITH LONG-TERM CURRENT USE OF INSULIN (HCC): ICD-10-CM

## 2024-06-07 DIAGNOSIS — E78.2 MIXED HYPERLIPIDEMIA: Primary | ICD-10-CM

## 2024-06-07 DIAGNOSIS — Z79.4 TYPE 2 DIABETES MELLITUS WITH DIABETIC POLYNEUROPATHY, WITH LONG-TERM CURRENT USE OF INSULIN (HCC): ICD-10-CM

## 2024-06-07 RX ORDER — ATORVASTATIN CALCIUM 40 MG/1
40 TABLET, FILM COATED ORAL
Qty: 30 TABLET | Refills: 0 | Status: SHIPPED | OUTPATIENT
Start: 2024-06-07

## 2024-06-11 DIAGNOSIS — E11.9 DIABETES (HCC): ICD-10-CM

## 2024-06-12 NOTE — PROGRESS NOTES
PT Evaluation     Today's date: 2024  Patient name: Tony Roberto  : 1985  MRN: [id number]  Referring provider: Fbaienne Augustine MD  Dx:   Encounter Diagnosis     ICD-10-CM    1. S/P AKA (above knee amputation) unilateral, left (HCC)  Z89.612 Ambulatory Referral to Physical Therapy            Start Time: 1030  Stop Time: 1115  Total time in clinic (min): 45 minutes    Assessment  Impairments: abnormal gait, abnormal or restricted ROM, activity intolerance, impaired balance, impaired physical strength, lacks appropriate home exercise program, pain with function and poor posture     Assessment details: Patient is a 37 y.o. female s/p L TFA presenting to PT with her new prepatory prosthesis for prosthetic training. Patient previously had been in PT but she required new fabrication of socket due to poor fit. On exam patient presents with impaired sound limb protective sensation, impaired gait, decreased endurance and LE strength. Patient requires max A for don/doff her gel liner and prosthesis. Patient will benefit from skilled PT to address the above impairments to improve her independence with functional mobility with her prosthesis.    Understanding of Dx/Px/POC: excellent     Prognosis: good    Goals  Within 8 weeks,   1. Patient will be independent with sock ply management.   2. Patient will be able to independently transfer from sit to stand with prosthesis.   3. Patient will be able to stand for >= 15 minutes with prosthesis.   4. Patient will be able to ambulate 250ft mod-I with RW with prosthesis.   5. Patient will be independent with stairs negotiation with 1 railing.   5. Patient will improve 2MWT by 112.5ft (IE: TBDft)  6. Patient will improve TUG 2s (IE: TBDs).        Within 16 weeks or by discharge,   1. Patient will be able to ambulate for >=20 minutes without rest.   2. Patient will be able to stand for >= 20 minutes with prosthesis to participate in household tasks.  3. Patient  will improve 2MWT by 150ft (IE: TBDft)  4. Patient will improve TUG 4s (IE: TBDs).   5. Patient will improve his gait speed by 0.2 m/s (IE: TBD m/s)  6. Patient will improve PLUS-M by 5.32 pts (IE: 21.8)  7. Patient will improve ABC by 20% (IE: 0%)    Plan  Patient would benefit from: skilled physical therapy  Planned modality interventions: cryotherapy and thermotherapy: hydrocollator packs    Planned therapy interventions: abdominal trunk stabilization, IADL retraining, joint mobilization, manual therapy, activity modification, balance, motor coordination training, balance/weight bearing training, prosthetic fitting/training, postural training, neuromuscular re-education, therapeutic training, therapeutic exercise, therapeutic activities, strengthening, stretching, transfer training, gait training and home exercise program    Frequency: 2x week  Duration in weeks: 16  Plan of Care beginning date: 6/14/2024  Plan of Care expiration date: 10/4/2024  Treatment plan discussed with: patient and referring physician        Subjective Evaluation    History of Present Illness  Date of surgery: 3/29/2023  Mechanism of injury: surgery  Mechanism of injury: Patient is s/p L TFA 2/2 PAD with left fem-pop bypass occluded on 9/14/21. She was previously in PT with her prepatory prosthesis but she was unable to fit in her prosthesis due to weight gain. Patient returns now 6 months later with new prep socket.     Comorbidities? PAD, HTN, COPD, drug abuse, bipolar, depression     Contralateral side? Denies any wounds, does not do foot checks    Prothestist? [facility] at [facility]    Prosthesis: K1 manual lock knee    Suspension: pin    Liners: plastic inner liner    Wear time? Does not wear at home    Sock ply management? 0    Activity level: sedentary the past 6 months while she was waiting for her prosthesis to be completed (multiple fabrications for proper fit)    Home set up: rents home, 1 level, no steps to enter  Patient  Goals  Patient goals for therapy: independence with ADLs/IADLs, increased strength, improved balance and decreased pain  Patient goal: to improve independence with walking with prosthesis  Pain  Pain scale: reports will have residual limb and phantom pain, especially at night.    Social Support  Lives with: spouse    Employment status: not working  Treatments  Previous treatment: physical therapy        Objective     Ambulation   Weight-Bearing Status   Assistive device used: wheeled walker  Neuro Exam:     Amputee   Level of amputation: left  and transfemoral     Residual skin inspection   Skin integrity: normal  Edema: no  Residual limb shape: normal   Gait comment: Short L step length and decreased weight bearing  Normal step length and foot clearance on R  Quickly fatigues with ambulation        Outcome Measure 6/14    ABC 0%    PLUS-M 21.8    AMPRO     TUG     2MWT     Gait speed     5x STS              POC expires Unit limit Auth Expiration date PT/OT/ST + Visit Limit?   10/4/24 N/A 3/5/25                              Visit/Unit Tracking  AUTH Status:  Date 6/14             Pending Used 1              Remaining                 Diagnosis: L TFA   Precautions: amputee   Insurance: [employer]    6/14          Vitals           Patient Ed           Volume control Wear  or prosthesis during the day                     Ther Activity           Sock ply Pre: 0  Post: 0          Don/doff 30 min  Patient requires max A to don gel liner, cuing to line up pin and to lock pin                                Neuro Re-Ed           Transfers W/C to table  Independent    Sit to stand  5x, independent with RW          GT Supervision assist + SOLO + WC follow  10 ft x 3                                                                            Ther Ex           Aerobic conditioning                                                                             Manual                                                                   Modalities

## 2024-06-14 ENCOUNTER — EVALUATION (OUTPATIENT)
Dept: PHYSICAL THERAPY | Facility: CLINIC | Age: 39
End: 2024-06-14
Payer: COMMERCIAL

## 2024-06-14 DIAGNOSIS — Z89.612 S/P AKA (ABOVE KNEE AMPUTATION) UNILATERAL, LEFT (HCC): ICD-10-CM

## 2024-06-14 PROCEDURE — 97162 PT EVAL MOD COMPLEX 30 MIN: CPT | Performed by: PHYSICAL THERAPIST

## 2024-06-14 PROCEDURE — 97530 THERAPEUTIC ACTIVITIES: CPT | Performed by: PHYSICAL THERAPIST

## 2024-06-18 ENCOUNTER — APPOINTMENT (EMERGENCY)
Dept: CT IMAGING | Facility: HOSPITAL | Age: 39
End: 2024-06-18
Payer: COMMERCIAL

## 2024-06-18 ENCOUNTER — HOSPITAL ENCOUNTER (EMERGENCY)
Facility: HOSPITAL | Age: 39
Discharge: HOME | End: 2024-06-18
Attending: EMERGENCY MEDICINE
Payer: COMMERCIAL

## 2024-06-18 VITALS
HEIGHT: 61 IN | DIASTOLIC BLOOD PRESSURE: 89 MMHG | BODY MASS INDEX: 52.3 KG/M2 | RESPIRATION RATE: 18 BRPM | OXYGEN SATURATION: 100 % | WEIGHT: 277 LBS | SYSTOLIC BLOOD PRESSURE: 152 MMHG | TEMPERATURE: 98.2 F | HEART RATE: 97 BPM

## 2024-06-18 DIAGNOSIS — R10.32 LEFT LOWER QUADRANT ABDOMINAL PAIN: Primary | ICD-10-CM

## 2024-06-18 DIAGNOSIS — R03.0 ELEVATED BLOOD PRESSURE READING: ICD-10-CM

## 2024-06-18 LAB
ALBUMIN SERPL BCP-MCNC: 3.3 G/DL (ref 3.5–5)
ALP SERPL-CCNC: 104 U/L (ref 34–104)
ALT SERPL W P-5'-P-CCNC: 26 U/L (ref 7–52)
ANION GAP SERPL CALCULATED.3IONS-SCNC: 6 MMOL/L (ref 4–13)
AST SERPL W P-5'-P-CCNC: 26 U/L (ref 13–39)
BACTERIA UR QL AUTO: ABNORMAL /HPF
BASOPHILS # BLD AUTO: 0.04 THOUSANDS/ÂΜL (ref 0–0.1)
BASOPHILS NFR BLD AUTO: 0 % (ref 0–1)
BILIRUB SERPL-MCNC: 0.27 MG/DL (ref 0.2–1)
BILIRUB UR QL STRIP: NEGATIVE
BUN SERPL-MCNC: 14 MG/DL (ref 5–25)
CALCIUM ALBUM COR SERPL-MCNC: 9.2 MG/DL (ref 8.3–10.1)
CALCIUM SERPL-MCNC: 8.6 MG/DL (ref 8.4–10.2)
CHLORIDE SERPL-SCNC: 105 MMOL/L (ref 96–108)
CLARITY UR: CLEAR
CO2 SERPL-SCNC: 26 MMOL/L (ref 21–32)
COLOR UR: YELLOW
CREAT SERPL-MCNC: 0.57 MG/DL (ref 0.6–1.3)
EOSINOPHIL # BLD AUTO: 0.17 THOUSAND/ÂΜL (ref 0–0.61)
EOSINOPHIL NFR BLD AUTO: 2 % (ref 0–6)
ERYTHROCYTE [DISTWIDTH] IN BLOOD BY AUTOMATED COUNT: 15.9 % (ref 11.6–15.1)
EXT PREGNANCY TEST URINE: NEGATIVE
EXT. CONTROL: NORMAL
GFR SERPL CREATININE-BSD FRML MDRD: 118 ML/MIN/1.73SQ M
GLUCOSE SERPL-MCNC: 156 MG/DL (ref 65–140)
GLUCOSE UR STRIP-MCNC: ABNORMAL MG/DL
HCT VFR BLD AUTO: 35.9 % (ref 34.8–46.1)
HGB BLD-MCNC: 12.7 G/DL (ref 11.5–15.4)
HGB UR QL STRIP.AUTO: NEGATIVE
IMM GRANULOCYTES # BLD AUTO: 0.09 THOUSAND/UL (ref 0–0.2)
IMM GRANULOCYTES NFR BLD AUTO: 1 % (ref 0–2)
KETONES UR STRIP-MCNC: ABNORMAL MG/DL
LEUKOCYTE ESTERASE UR QL STRIP: NEGATIVE
LIPASE SERPL-CCNC: 16 U/L (ref 11–82)
LYMPHOCYTES # BLD AUTO: 3.39 THOUSANDS/ÂΜL (ref 0.6–4.47)
LYMPHOCYTES NFR BLD AUTO: 30 % (ref 14–44)
MCH RBC QN AUTO: 25.8 PG (ref 26.8–34.3)
MCHC RBC AUTO-ENTMCNC: 35.4 G/DL (ref 31.4–37.4)
MCV RBC AUTO: 73 FL (ref 82–98)
MONOCYTES # BLD AUTO: 0.48 THOUSAND/ÂΜL (ref 0.17–1.22)
MONOCYTES NFR BLD AUTO: 4 % (ref 4–12)
MUCOUS THREADS UR QL AUTO: ABNORMAL
NEUTROPHILS # BLD AUTO: 7.22 THOUSANDS/ÂΜL (ref 1.85–7.62)
NEUTS SEG NFR BLD AUTO: 63 % (ref 43–75)
NITRITE UR QL STRIP: NEGATIVE
NON-SQ EPI CELLS URNS QL MICRO: ABNORMAL /HPF
NRBC BLD AUTO-RTO: 0 /100 WBCS
PH UR STRIP.AUTO: 6 [PH]
PLATELET # BLD AUTO: 371 THOUSANDS/UL (ref 149–390)
PMV BLD AUTO: 10.4 FL (ref 8.9–12.7)
POTASSIUM SERPL-SCNC: 4 MMOL/L (ref 3.5–5.3)
PROT SERPL-MCNC: 6.5 G/DL (ref 6.4–8.4)
PROT UR STRIP-MCNC: ABNORMAL MG/DL
RBC # BLD AUTO: 4.92 MILLION/UL (ref 3.81–5.12)
RBC #/AREA URNS AUTO: ABNORMAL /HPF
SODIUM SERPL-SCNC: 137 MMOL/L (ref 135–147)
SP GR UR STRIP.AUTO: 1.03 (ref 1–1.03)
UROBILINOGEN UR STRIP-ACNC: 8 MG/DL
WBC # BLD AUTO: 11.39 THOUSAND/UL (ref 4.31–10.16)
WBC #/AREA URNS AUTO: ABNORMAL /HPF

## 2024-06-18 PROCEDURE — 81025 URINE PREGNANCY TEST: CPT

## 2024-06-18 PROCEDURE — 96375 TX/PRO/DX INJ NEW DRUG ADDON: CPT

## 2024-06-18 PROCEDURE — 99284 EMERGENCY DEPT VISIT MOD MDM: CPT

## 2024-06-18 PROCEDURE — 96361 HYDRATE IV INFUSION ADD-ON: CPT

## 2024-06-18 PROCEDURE — 36415 COLL VENOUS BLD VENIPUNCTURE: CPT

## 2024-06-18 PROCEDURE — 74177 CT ABD & PELVIS W/CONTRAST: CPT

## 2024-06-18 PROCEDURE — 96374 THER/PROPH/DIAG INJ IV PUSH: CPT

## 2024-06-18 PROCEDURE — 93005 ELECTROCARDIOGRAM TRACING: CPT

## 2024-06-18 PROCEDURE — 85025 COMPLETE CBC W/AUTO DIFF WBC: CPT | Performed by: EMERGENCY MEDICINE

## 2024-06-18 PROCEDURE — 83690 ASSAY OF LIPASE: CPT | Performed by: EMERGENCY MEDICINE

## 2024-06-18 PROCEDURE — 80053 COMPREHEN METABOLIC PANEL: CPT | Performed by: EMERGENCY MEDICINE

## 2024-06-18 PROCEDURE — 99285 EMERGENCY DEPT VISIT HI MDM: CPT | Performed by: EMERGENCY MEDICINE

## 2024-06-18 PROCEDURE — 81001 URINALYSIS AUTO W/SCOPE: CPT

## 2024-06-18 RX ORDER — KETOROLAC TROMETHAMINE 30 MG/ML
15 INJECTION, SOLUTION INTRAMUSCULAR; INTRAVENOUS ONCE
Status: COMPLETED | OUTPATIENT
Start: 2024-06-18 | End: 2024-06-18

## 2024-06-18 RX ORDER — DROPERIDOL 2.5 MG/ML
0.62 INJECTION, SOLUTION INTRAMUSCULAR; INTRAVENOUS ONCE
Status: COMPLETED | OUTPATIENT
Start: 2024-06-18 | End: 2024-06-18

## 2024-06-18 RX ADMIN — IOHEXOL 100 ML: 350 INJECTION, SOLUTION INTRAVENOUS at 17:34

## 2024-06-18 RX ADMIN — SODIUM CHLORIDE 1000 ML: 0.9 INJECTION, SOLUTION INTRAVENOUS at 17:18

## 2024-06-18 RX ADMIN — DROPERIDOL 0.62 MG: 2.5 INJECTION, SOLUTION INTRAMUSCULAR; INTRAVENOUS at 17:19

## 2024-06-18 RX ADMIN — KETOROLAC TROMETHAMINE 15 MG: 30 INJECTION, SOLUTION INTRAMUSCULAR; INTRAVENOUS at 17:17

## 2024-06-18 NOTE — DISCHARGE INSTRUCTIONS
You were evaluated in the emergency department for: abdominal pain. You can access your current and pending results through [employer].    - You should follow-up with your primary care provider, as soon as possible, for re-evaluation.    Please, return to the emergency department if you experience new or worsening symptoms, fever, chest pain, shortness of breath, difficulty breathing, dizziness, abdominal pain, persistent nausea/vomiting, syncope or passing out, blood in your urine or stool, coughing up blood, leg swelling/pain, urinary retention, bowel or bladder incontinence, numbness between your legs.

## 2024-06-18 NOTE — ED PROCEDURE NOTE
Procedure  POC Biliary US    Date/Time: 6/18/2024 7:10 PM    Performed by: Andre Babcock DO  Authorized by: Andre Babcock DO    Patient location:  ED  Performed by:  Resident  Procedure details:     Exam Type:  Educational    Indications: upper right quadrant abdominal pain      Assessment for:  Cholecystitis and cholelithiasis    Image quality: non-diagnostic      Image availability:  Images available in PACS  Findings:     Cholelithiasis: not identified      Common bile duct:  Unable to visualize    Gallbladder wall:  Unable to visualize  Comments:      Unable to visualize structures 2/2 body habitus  POC AAA US    Date/Time: 6/18/2024 7:10 PM    Performed by: Andre Babcock DO  Authorized by: Andre Babcock DO    Patient location:  ED  Performing Provider:  Resident  Procedure details:     Exam Type:  Educational    Indications: abdominal pain      Image quality: non-diagnostic      Image availability:  Images available in PACS  Findings:     Abdominal Aorta Findings: limited visualization of suprarenal aorta and limited visualization of infrarenal aorta    Comments:      Unable to visualize structures 2/2 body habitus                   Andre Babcock DO  06/18/24 1910

## 2024-06-18 NOTE — ED PROVIDER NOTES
History  Chief Complaint   Patient presents with    Abdominal Pain     Pt from home with a CC of LLQ abd pain that started yesterday. +N/V. Constipation yesterday, diarrhea today. Pt also c/o intermittent numbness to L hand x2 days     HPI    Tony Roberto is a 38 y.o. female with history of HTN, HLD, Type 2 DM, left AKA, COPD, Bipolar, ANNA, PAD presenting for abdominal pain.    Patient reports with progressively worse LLQ abdominal pain, and 1 episode of nonbloody nonbilious vomiting starting yesterday, not changed in location.  Denies fevers, chills, chest pain, shortness of breath. Patient reports that she has not had pain like this before.  Denies possibility that she could be pregnant, reports that she is on a female only relationship, no vaginal bleeding or abnormal vaginal discharge.  Only abdominal surgery that she is aware of is her  approximately 14 years ago.  Menstrual period at the beginning of this month.    Prior to Admission Medications   Prescriptions Last Dose Informant Patient Reported? Taking?   Advair -21 MCG/ACT inhaler  Self No No   Sig: Inhale 2 puffs 2 (two) times a day Rinse mouth after use   Alcohol Swabs (Pharmacist Choice Alcohol) PADS  Outside Facility (Specify), Self No No   Sig: USE 3-4 TIMES AS DIRECTED.   Blood Glucose Monitoring Suppl (ONE TOUCH ULTRA 2) w/Device KIT  Self No No   Sig: BY DEVICE ROUTE 2 (TWO) TIMES A DAY CHECK BLOOD SUGARS AND RECORD VALUE AND TIME OF DAY TWICE A DAY   Blood Pressure Monitoring (Blood Pressure Monitor Automat) KATLYN  Self No No   Sig: Use Daily as Directed   Patient not taking: Reported on 2024   Comfort EZ Pen Needles 32G X 4 MM MISC   No No   Sig: INJECT UNDER THE SKIN 4 (FOUR) TIMES A DAY (BEFORE MEALS AND AT BEDTIME)   Comfort EZ Pen Needles 33G X 4 MM MISC  Outside Facility (Specify), Self No No   Sig: USE AS DIRECTED   DULoxetine (CYMBALTA) 30 mg delayed release capsule   No No   Sig: TAKE 2 CAPSULES (60 MG TOTAL) BY  "MOUTH DAILY   Global Inject Ease Lancets 30G MISC  Self No No   Sig: USE 3 (THREE) TIMES A DAY   INS SYRINGE/NEEDLE .5CC/28G (B-D INS SYR MICROFINE .5CC/28G) 28G X 1/2\" 0.5 ML MISC   No No   Sig: USE AS DIRECTED   Lantus 100 UNIT/ML subcutaneous injection   No No   Sig: INJECT 30 UNITS UNDER THE SKIN EVERY 12 (TWELVE) HOURS   OneTouch Ultra test strip  Self No No   Sig: Use 1 each daily as needed (before meals) Use as instructed   Sure Comfort Insulin Syringe 31G X 5/16\" 0.3 ML MISC  Self Yes No   Si (two) times a day Use as directed   Trulicity 1.5 MG/0.5ML injection   No No   Sig: AS DIRECTED WEEKLY   Xarelto 20 MG tablet   No No   Sig: TAKE 1 TABLET (20 MG TOTAL) BY MOUTH DAILY WITH BREAKFAST   albuterol (PROVENTIL HFA,VENTOLIN HFA) 90 mcg/act inhaler  Self No No   Sig: INHALE 2 PUFFS EVERY 4 (FOUR) HOURS AS NEEDED FOR WHEEZING OR SHORTNESS OF BREATH   aspirin 81 mg chewable tablet   No No   Sig: CHEW 1 TABLET (81 MG TOTAL) DAILY   atorvastatin (LIPITOR) 40 mg tablet   No No   Sig: TAKE 1 TABLET (40 MG TOTAL) BY MOUTH DAILY WITH DINNER   dulaglutide (Trulicity) 0.75 MG/0.5ML injection   No No   Sig: Inject 0.5 mL (0.75 mg total) under the skin every 7 days   ferrous sulfate 324 (65 Fe) mg  Self No No   Sig: Take 1 tablet (324 mg total) by mouth every other day   Patient not taking: Reported on 2024   glycerin-hypromellose- (ARTIFICIAL TEARS) 0.2-0.2-1 % SOLN  Self No No   Sig: Administer 2 drops to both eyes every 6 (six) hours as needed (for eye discomfort)   hydrOXYzine HCL (ATARAX) 50 mg tablet  Self No No   Sig: TAKE 1 TABLET (50 MG TOTAL) BY MOUTH DAILY AT BEDTIME AS NEEDED FOR ITCHING OR ANXIETY   insulin lispro (HumALOG/ADMELOG) 100 units/mL injection   No No   Sig: INJECT 4-16 UNITS UNDER THE SKIN 3 (THREE) TIMES A DAY BEFORE MEALS   ketotifen (ZADITOR) 0.025 % ophthalmic solution  Self No No   Sig: Administer 1 drop to both eyes 2 (two) times a day as needed (for eye discomfort) "   lisinopril (ZESTRIL) 20 mg tablet  Self No No   Sig: Take 1 tablet (20 mg total) by mouth daily   metFORMIN (GLUCOPHAGE) 1000 MG tablet   No No   Sig: TAKE ONE (1) TABLET BY MOUTH TWICE DAILY WITH FOOD   nicotine (NICODERM CQ) 21 mg/24 hr TD 24 hr patch  Self No No   Sig: Place 1 patch on the skin over 24 hours daily   Patient not taking: Reported on 2024   prazosin (MINIPRESS) 1 mg capsule   No No   Sig: TAKE ONE (1) CAPSULE ONCE A DAY AT BEDTIME FOR HYPERTENSION   topiramate (TOPAMAX) 25 mg tablet  Self No No   Sig: TAKE 1 TABLET (25 MG TOTAL) BY MOUTH 2 (TWO) TIMES A DAY      Facility-Administered Medications: None       Past Medical History:   Diagnosis Date    Asthma     Atherosclerosis     Bipolar 1 disorder (HCC)     COPD (chronic obstructive pulmonary disease) (HCC)     Depression     Diabetes mellitus (HCC)     Hypertension     Psychiatric disorder     cutting history    PTSD (post-traumatic stress disorder)     Schizoaffective disorder (HCC)     Tendonitis        Past Surgical History:   Procedure Laterality Date    AMPUTATION ABOVE KNEE (AKA) Left 3/29/2023    Procedure: AMPUTATION ABOVE KNEE (AKA);  Surgeon: Savi Langford MD;  Location: [facility];  Service: Vascular    BYPASS FEMORAL-POPLITEAL Left 2021    Procedure: Left Common Femoral Below Knee to Popliteal Bypass with Insitu GSV graft.  Left Lower Extremity Angiogram;  Surgeon: Savi Langford MD;  Location: [facility];  Service: Vascular     SECTION      EAR SURGERY      IR LOWER EXTREMITY ANGIOGRAM  2021    IR LOWER EXTREMITY ANGIOGRAM  2021    NM SLCTV CATHJ 3RD+ ORD SLCTV ABDL PEL/LXTR BRNCH Left 3/24/2023    Procedure: Left leg angiogram;  Surgeon: Byron Wahl DO;  Location: [facility];  Service: Vascular    TUBAL LIGATION         Family History   Problem Relation Age of Onset    Hypertension Mother     Diabetes Mother     HIV Mother     Heart disease Mother     No Known Problems Father      I have reviewed and  "agree with the history as documented.    E-Cigarette/Vaping    E-Cigarette Use Never User      E-Cigarette/Vaping Substances    Nicotine No     THC No     CBD No     Flavoring No     Other No     Unknown No      Social History     Tobacco Use    Smoking status: Every Day     Average packs/day: 1 pack/day for 20.0 years (20.0 ttl pk-yrs)     Types: Cigarettes     Start date: 3/24/2003     Last attempt to quit: 3/24/2023     Years since quittin.2     Passive exposure: Current    Smokeless tobacco: Former     Quit date: 2022   Vaping Use    Vaping status: Never Used   Substance Use Topics    Alcohol use: Not Currently     Comment: not currently    Drug use: Not Currently     Types: Cocaine, Marijuana, \"Crack\" cocaine     Comment: 1 years clean from crack cocaine since         Review of Systems   Constitutional:  Negative for chills and fever.   Eyes:  Negative for visual disturbance.   Respiratory:  Negative for cough and shortness of breath.    Cardiovascular:  Negative for chest pain, palpitations and leg swelling.   Gastrointestinal:  Positive for abdominal pain, nausea and vomiting. Negative for abdominal distention, blood in stool, constipation and diarrhea.   Genitourinary:  Negative for dysuria.   Musculoskeletal:  Negative for arthralgias and back pain.   Skin:  Negative for color change and rash.   Neurological:  Negative for dizziness, seizures, syncope, weakness, light-headedness and headaches.   Psychiatric/Behavioral:  Negative for dysphoric mood.    All other systems reviewed and are negative.      Physical Exam  ED Triage Vitals   Temperature Pulse Respirations Blood Pressure SpO2   24 1602 24 1600 24 1600 24 1600 24 1600   98.2 °F (36.8 °C) 92 18 152/89 100 %      Temp Source Heart Rate Source Patient Position - Orthostatic VS BP Location FiO2 (%)   24 1602 24 1600 24 1600 24 1600 --   Oral Monitor Sitting Right arm       Pain Score     "   06/18/24 1600       10 - Worst Possible Pain             Orthostatic Vital Signs  Vitals:    06/18/24 1600 06/18/24 1800   BP: 152/89    Pulse: 92 97   Patient Position - Orthostatic VS: Sitting        Physical Exam  Vitals and nursing note reviewed.   Constitutional:       Appearance: She is well-developed. She is ill-appearing.   HENT:      Head: Normocephalic and atraumatic.      Mouth/Throat:      Mouth: Mucous membranes are moist.      Pharynx: Oropharynx is clear.   Eyes:      Extraocular Movements: Extraocular movements intact.      Conjunctiva/sclera: Conjunctivae normal.      Pupils: Pupils are equal, round, and reactive to light.   Cardiovascular:      Rate and Rhythm: Normal rate and regular rhythm.      Pulses: Normal pulses.      Heart sounds: Normal heart sounds. No murmur heard.  Pulmonary:      Effort: Pulmonary effort is normal. No respiratory distress.      Breath sounds: Normal breath sounds. No wheezing, rhonchi or rales.   Abdominal:      General: Abdomen is flat.      Palpations: Abdomen is soft.      Tenderness: There is generalized abdominal tenderness (Especially in the LLQ). There is no right CVA tenderness, left CVA tenderness, guarding or rebound. Negative signs include Gill's sign and McBurney's sign.      Hernia: A hernia is present.   Musculoskeletal:      Cervical back: Normal range of motion.      Right lower leg: No edema.      Left Lower Extremity: Left leg is amputated above knee.   Skin:     General: Skin is warm and dry.      Capillary Refill: Capillary refill takes less than 2 seconds.   Neurological:      General: No focal deficit present.      Mental Status: She is alert and oriented to person, place, and time.      Sensory: No sensory deficit.      Motor: No weakness.   Psychiatric:         Mood and Affect: Mood normal.         Behavior: Behavior normal.         ED Medications  Medications   sodium chloride 0.9 % bolus 1,000 mL (0 mL Intravenous Stopped 6/18/24 1900)    ketorolac (TORADOL) injection 15 mg (15 mg Intravenous Given 6/18/24 1717)   droperidol (INAPSINE) injection 0.625 mg (0.625 mg Intravenous Given 6/18/24 1719)   iohexol (OMNIPAQUE) 350 MG/ML injection (MULTI-DOSE) 100 mL (100 mL Intravenous Given 6/18/24 1734)       Diagnostic Studies  Results Reviewed       Procedure Component Value Units Date/Time    Urine Microscopic [[id number]]  (Abnormal) Collected: 06/18/24 1717    Lab Status: Final result Specimen: Urine, Other Updated: 06/18/24 1756     RBC, UA 4-10 /hpf      WBC, UA 1-2 /hpf      Epithelial Cells Occasional /hpf      Bacteria, UA Occasional /hpf      MUCUS THREADS Occasional    UA w Reflex to Microscopic w Reflex to Culture [[id number]]  (Abnormal) Collected: 06/18/24 1717    Lab Status: Final result Specimen: Urine, Other Updated: 06/18/24 1745     Color, UA Yellow     Clarity, UA Clear     Specific Gravity, UA 1.031     pH, UA 6.0     Leukocytes, UA Negative     Nitrite, UA Negative     Protein, UA 30 (1+) mg/dl      Glucose, UA Trace mg/dl      Ketones, UA Trace mg/dl      Urobilinogen, UA 8.0 mg/dl      Bilirubin, UA Negative     Occult Blood, UA Negative    POCT pregnancy, urine [[id number]]  (Normal) Resulted: 06/18/24 1710    Lab Status: Final result Updated: 06/18/24 1720     EXT Preg Test, Ur Negative     Control Valid    Comprehensive metabolic panel [[id number]]  (Abnormal) Collected: 06/18/24 1612    Lab Status: Final result Specimen: Blood from Arm, Right Updated: 06/18/24 1634     Sodium 137 mmol/L      Potassium 4.0 mmol/L      Chloride 105 mmol/L      CO2 26 mmol/L      ANION GAP 6 mmol/L      BUN 14 mg/dL      Creatinine 0.57 mg/dL      Glucose 156 mg/dL      Calcium 8.6 mg/dL      Corrected Calcium 9.2 mg/dL      AST 26 U/L      ALT 26 U/L      Alkaline Phosphatase 104 U/L      Total Protein 6.5 g/dL      Albumin 3.3 g/dL      Total Bilirubin 0.27 mg/dL      eGFR 118 ml/min/1.73sq m     Narrative:      [employer]  guidelines for Chronic Kidney Disease (CKD):     Stage 1 with normal or high GFR (GFR > 90 mL/min/1.73 square meters)    Stage 2 Mild CKD (GFR = 60-89 mL/min/1.73 square meters)    Stage 3A Moderate CKD (GFR = 45-59 mL/min/1.73 square meters)    Stage 3B Moderate CKD (GFR = 30-44 mL/min/1.73 square meters)    Stage 4 Severe CKD (GFR = 15-29 mL/min/1.73 square meters)    Stage 5 End Stage CKD (GFR <15 mL/min/1.73 square meters)  Note: GFR calculation is accurate only with a steady state creatinine    Lipase [[id number]]  (Normal) Collected: 06/18/24 1612    Lab Status: Final result Specimen: Blood from Arm, Right Updated: 06/18/24 1634     Lipase 16 u/L     CBC and differential [[id number]]  (Abnormal) Collected: 06/18/24 1612    Lab Status: Final result Specimen: Blood from Arm, Right Updated: 06/18/24 1618     WBC 11.39 Thousand/uL      RBC 4.92 Million/uL      Hemoglobin 12.7 g/dL      Hematocrit 35.9 %      MCV 73 fL      MCH 25.8 pg      MCHC 35.4 g/dL      RDW 15.9 %      MPV 10.4 fL      Platelets 371 Thousands/uL      nRBC 0 /100 WBCs      Segmented % 63 %      Immature Grans % 1 %      Lymphocytes % 30 %      Monocytes % 4 %      Eosinophils Relative 2 %      Basophils Relative 0 %      Absolute Neutrophils 7.22 Thousands/µL      Absolute Immature Grans 0.09 Thousand/uL      Absolute Lymphocytes 3.39 Thousands/µL      Absolute Monocytes 0.48 Thousand/µL      Eosinophils Absolute 0.17 Thousand/µL      Basophils Absolute 0.04 Thousands/µL                    CT abdomen pelvis with contrast   Final Result by Serg Drsicoll DO (06/18 1902)   No CT explanation for patient's symptoms.                  Workstation performed: [id number]               Procedures  Procedures      ED Course  ED Course as of 06/19/24 0054 Tue Jun 18, 2024   1612 Blood Pressure: 152/89  152/89, HR 92, RR 18, SpO2 100%, 98.2 F   1704 CBC and differential(!)  Very mild leukocytosis, normal hemoglobin, normal platelets.   1706 LIPASE:  16  Normal   1706 Comprehensive metabolic panel(!)  Normal electrolytes, normal kidney function, elevated glucose to 156, decreased albumin to 3.3, liver function otherwise normal.   1732 PREGNANCY TEST URINE: Negative   1732 CT abdomen pelvis with contrast  Exam began   1746 Leukocytes, UA: Negative   1746 Nitrite, UA: Negative   1759 Re-evaluated at bedside, reports improvement of pain.   1815 CT abdomen pelvis with contrast  Pending CT read   1909 CT abdomen pelvis with contrast  IMPRESSION:  No CT explanation for patient's symptoms.   1921 Discussed all results with patient. Offered further pain control but patient at this time declined, expressing desire for discharge. All return precautions discussed.                             SBIRT 22yo+      Flowsheet Row Most Recent Value   Initial Alcohol Screen: US AUDIT-C     1. How often do you have a drink containing alcohol? 0 Filed at: 06/18/2024 1600   2. How many drinks containing alcohol do you have on a typical day you are drinking?  0 Filed at: 06/18/2024 1600   Audit-C Score 0 Filed at: 06/18/2024 1600   CRISTÓBAL: How many times in the past year have you...    Used an illegal drug or used a prescription medication for non-medical reasons? Never Filed at: 06/18/2024 1600                  Medical Decision Making  Tony Roberto is a 38 y.o. female presenting for abdominal pain.  On presentation to the ED, patient was afebrile, vital signs showing elevated blood pressure, otherwise normal. Exam remarkable for generalized abdominal pain without guarding, especially in the LLQ. She also has a left AKA. Otherwise unremarkable.    DDx including but not limited to: appendicitis, gastroenteritis, gastritis, PUD, GERD, gastroparesis, hepatitis, pancreatitis, colitis, enteritis, food poisoning, mesenteric adenitis, epiploic appendagitis, IBD, IBS, ileus, bowel obstruction, volvulus, cholecystitis, biliary colic, choledocholithiasis, perforated viscus, tumor, splenic  etiology, diverticulitis, internal hernia, constipation, pelvic pathology, renal colic, pyelonephritis, UTI.    Based on results available while the patient was in the ED:  Lab work showing very mild leukocytosis, otherwise unrevealing. CT of the abdomen without acute findings. Pain improved with Toradol, droperidol and IVF. Discussed all results with patient, patient expressing desire for discharge.    See ED Course for further updates    After evaluation and workup in the emergency department Patient appears well, is nontoxic appearing, expresses understanding and agrees with plan of care at this time.  In light of this patient would benefit from outpatient management. Discussed all results with patient including lab work, imaging, and evaluation.  Discussed strict return precautions.  Discussed importance of following up with PCP in the next few days.  All questions answered.  Patient is agreeable to discharge.    Amount and/or Complexity of Data Reviewed  Labs: ordered. Decision-making details documented in ED Course.  Radiology: ordered. Decision-making details documented in ED Course.    Risk  Prescription drug management.          Disposition  Final diagnoses:   Left lower quadrant abdominal pain   Elevated blood pressure reading     Time reflects when diagnosis was documented in both MDM as applicable and the Disposition within this note       Time User Action Codes Description Comment    6/18/2024  7:09 PM Arely Lainez Add [R10.32] Left lower quadrant abdominal pain     6/18/2024  7:10 PM Arely Lainez Add [R03.0] Elevated blood pressure reading           ED Disposition       ED Disposition   Discharge    Condition   Stable    Date/Time   Tue Jun 18, 2024 1909    Comment   Tony Roberto discharge to home/self care.                   Follow-up Information       Follow up With Specialties Details Why Contact Info Additional Information    [facility] Family Medicine  Go to  Re-evaluation of symptoms [address]  [address]  [facility] [address] [phone] [facility], [address], [address], [place], [address], [phone]    [facility] Emergency Department Emergency Medicine Go to  As needed, If symptoms worsen [address] [facility] [id number] [phone] [facility] Emergency Department, [address] [place], [place], [address]            Discharge Medication List as of 6/18/2024  7:12 PM        CONTINUE these medications which have NOT CHANGED    Details   Advair -21 MCG/ACT inhaler Inhale 2 puffs 2 (two) times a day Rinse mouth after use, Starting Wed 8/9/2023, Normal      albuterol (PROVENTIL HFA,VENTOLIN HFA) 90 mcg/act inhaler INHALE 2 PUFFS EVERY 4 (FOUR) HOURS AS NEEDED FOR WHEEZING OR SHORTNESS OF BREATH, Starting Fri 10/6/2023, Normal      Alcohol Swabs (Pharmacist Choice Alcohol) PADS USE 3-4 TIMES AS DIRECTED., Normal      aspirin 81 mg chewable tablet CHEW 1 TABLET (81 MG TOTAL) DAILY, Starting Thu 2/22/2024, Normal      atorvastatin (LIPITOR) 40 mg tablet TAKE 1 TABLET (40 MG TOTAL) BY MOUTH DAILY WITH DINNER, Starting Fri 6/7/2024, Normal      Blood Glucose Monitoring Suppl (ONE TOUCH ULTRA 2) w/Device KIT BY DEVICE ROUTE 2 (TWO) TIMES A DAY CHECK BLOOD SUGARS AND RECORD VALUE AND TIME OF DAY TWICE A DAY, Normal      Blood Pressure Monitoring (Blood Pressure Monitor Automat) KATLYN Use Daily as Directed, Normal      !! Comfort EZ Pen Needles 32G X 4 MM MISC INJECT UNDER THE SKIN 4 (FOUR) TIMES A DAY (BEFORE MEALS AND AT BEDTIME), Starting Fri 4/26/2024, Normal      !! Comfort EZ Pen Needles 33G X 4 MM MISC USE AS DIRECTED, Normal      dulaglutide (Trulicity) 0.75 MG/0.5ML injection Inject 0.5 mL (0.75 mg total) under the skin every 7 days, Starting Tue 4/2/2024, Normal      DULoxetine (CYMBALTA) 30 mg delayed release capsule TAKE  "2 CAPSULES (60 MG TOTAL) BY MOUTH DAILY, Starting Tue 6/18/2024, Normal      ferrous sulfate 324 (65 Fe) mg Take 1 tablet (324 mg total) by mouth every other day, Starting Tue 11/14/2023, Normal      Global Inject Ease Lancets 30G MISC USE 3 (THREE) TIMES A DAY, Starting Wed 11/1/2023, Normal      glycerin-hypromellose- (ARTIFICIAL TEARS) 0.2-0.2-1 % SOLN Administer 2 drops to both eyes every 6 (six) hours as needed (for eye discomfort), Starting Mon 8/28/2023, Normal      hydrOXYzine HCL (ATARAX) 50 mg tablet TAKE 1 TABLET (50 MG TOTAL) BY MOUTH DAILY AT BEDTIME AS NEEDED FOR ITCHING OR ANXIETY, Starting Wed 10/18/2023, Normal      INS SYRINGE/NEEDLE .5CC/28G (B-D INS SYR MICROFINE .5CC/28G) 28G X 1/2\" 0.5 ML MISC USE AS DIRECTED, Normal      insulin lispro (HumALOG/ADMELOG) 100 units/mL injection INJECT 4-16 UNITS UNDER THE SKIN 3 (THREE) TIMES A DAY BEFORE MEALS, Starting Thu 5/30/2024, Normal      ketotifen (ZADITOR) 0.025 % ophthalmic solution Administer 1 drop to both eyes 2 (two) times a day as needed (for eye discomfort), Starting Mon 8/28/2023, Normal      Lantus 100 UNIT/ML subcutaneous injection INJECT 30 UNITS UNDER THE SKIN EVERY 12 (TWELVE) HOURS, Normal      lisinopril (ZESTRIL) 20 mg tablet Take 1 tablet (20 mg total) by mouth daily, Starting Wed 8/9/2023, Normal      metFORMIN (GLUCOPHAGE) 1000 MG tablet TAKE ONE (1) TABLET BY MOUTH TWICE DAILY WITH FOOD, Normal      nicotine (NICODERM CQ) 21 mg/24 hr TD 24 hr patch Place 1 patch on the skin over 24 hours daily, Starting Mon 9/18/2023, Normal      OneTouch Ultra test strip Use 1 each daily as needed (before meals) Use as instructed, Starting Mon 9/18/2023, Normal      prazosin (MINIPRESS) 1 mg capsule TAKE ONE (1) CAPSULE ONCE A DAY AT BEDTIME FOR HYPERTENSION, Normal      Sure Comfort Insulin Syringe 31G X 5/16\" 0.3 ML MISC 2 (two) times a day Use as directed, Starting Thu 11/9/2023, Historical Med      topiramate (TOPAMAX) 25 mg tablet TAKE " 1 TABLET (25 MG TOTAL) BY MOUTH 2 (TWO) TIMES A DAY, Starting Fri 10/6/2023, Normal      Trulicity 1.5 MG/0.5ML injection AS DIRECTED WEEKLY, Normal      Xarelto 20 MG tablet TAKE 1 TABLET (20 MG TOTAL) BY MOUTH DAILY WITH BREAKFAST, Normal       !! - Potential duplicate medications found. Please discuss with provider.        No discharge procedures on file.    PDMP Review         Value Time User    PDMP Reviewed  Yes 6/18/2024  5:12 PM Mariam Baptiste, DO             ED Provider  Attending physically available and evaluated Tony Roberto. I managed the patient along with the ED Attending.    Electronically Signed by           Arely Lainez MD  06/19/24 0054

## 2024-06-18 NOTE — ED ATTENDING ATTESTATION
6/18/2024  IMariam DO, saw and evaluated the patient. I have discussed the patient with the resident/non-physician practitioner and agree with the resident's/non-physician practitioner's findings, Plan of Care, and MDM as documented in the resident's/non-physician practitioner's note, except where noted. All available labs and Radiology studies were reviewed.  I was present for key portions of any procedure(s) performed by the resident/non-physician practitioner and I was immediately available to provide assistance.       At this point I agree with the current assessment done in the Emergency Department.  I have conducted an independent evaluation of this patient a history and physical is as follows:    History  Patient is a 38 y.o. year old female with a past medical history of HTN, COPD, and DM who presents for evaluation with left lower quadrant abdominal pain.      Current Outpatient Medications   Medication Instructions    Advair -21 MCG/ACT inhaler 2 puffs, Inhalation, 2 times daily, Rinse mouth after use    albuterol (PROVENTIL HFA,VENTOLIN HFA) 90 mcg/act inhaler 2 puffs, Inhalation, Every 4 hours PRN    Alcohol Swabs (Pharmacist Choice Alcohol) PADS USE 3-4 TIMES AS DIRECTED.    aspirin 81 mg, Oral, Daily    atorvastatin (LIPITOR) 40 mg, Oral, Daily with dinner    Blood Glucose Monitoring Suppl (ONE TOUCH ULTRA 2) w/Device KIT BY DEVICE ROUTE 2 (TWO) TIMES A DAY CHECK BLOOD SUGARS AND RECORD VALUE AND TIME OF DAY TWICE A DAY    Blood Pressure Monitoring (Blood Pressure Monitor Automat) KATLYN Use Daily as Directed    Comfort EZ Pen Needles 32G X 4 MM MISC Subcutaneous, 4 times daily (before meals and at bedtime)    Comfort EZ Pen Needles 33G X 4 MM MISC USE AS DIRECTED    DULoxetine (CYMBALTA) 60 mg, Oral, Daily    ferrous sulfate 324 mg, Oral, Every other day    Global Inject Ease Lancets 30G MISC Other, 3 times daily    glycerin-hypromellose- (ARTIFICIAL TEARS) 0.2-0.2-1 % SOLN 2  Patient call back for the status of the letter. Patient state she just don't want to received to late because will need it for tomorrow. Letter need to be sent through My Chart. Thank you.    "drops, Both Eyes, Every 6 hours PRN    hydrOXYzine HCL (ATARAX) 50 mg, Oral, Daily at bedtime PRN    INS SYRINGE/NEEDLE .5CC/28G (B-D INS SYR MICROFINE .5CC/28G) 28G X 1/2\" 0.5 ML MISC USE AS DIRECTED    insulin lispro (HUMALOG/ADMELOG) 4-16 Units, Subcutaneous, 3 times daily before meals    ketotifen (ZADITOR) 0.025 % ophthalmic solution 1 drop, Both Eyes, 2 times daily PRN    Lantus 100 UNIT/ML subcutaneous injection INJECT 30 UNITS UNDER THE SKIN EVERY 12 (TWELVE) HOURS    lisinopril (ZESTRIL) 20 mg, Oral, Daily    metFORMIN (GLUCOPHAGE) 1000 MG tablet TAKE ONE (1) TABLET BY MOUTH TWICE DAILY WITH FOOD    nicotine (NICODERM CQ) 21 mg/24 hr TD 24 hr patch 1 patch, Transdermal, Daily    OneTouch Ultra test strip 1 each, Other, Daily PRN, Use as instructed    prazosin (MINIPRESS) 1 mg capsule TAKE ONE (1) CAPSULE ONCE A DAY AT BEDTIME FOR HYPERTENSION    Sure Comfort Insulin Syringe 31G X 5/16\" 0.3 ML MISC 2 times daily, Use as directed    topiramate (TOPAMAX) 25 mg, Oral, 2 times daily    Trulicity 1.5 MG/0.5ML injection AS DIRECTED WEEKLY    Trulicity 0.75 mg, Subcutaneous, Every 7 days    Xarelto 20 MG tablet TAKE 1 TABLET (20 MG TOTAL) BY MOUTH DAILY WITH BREAKFAST     Past Medical History:   Diagnosis Date    Asthma     Atherosclerosis     Bipolar 1 disorder (HCC)     COPD (chronic obstructive pulmonary disease) ([facility])     Depression     Diabetes mellitus (HCC)     Hypertension     Psychiatric disorder     cutting history    PTSD (post-traumatic stress disorder)     Schizoaffective disorder ([facility])     Tendonitis      Past Surgical History:   Procedure Laterality Date    AMPUTATION ABOVE KNEE (AKA) Left 3/29/2023    Procedure: AMPUTATION ABOVE KNEE (AKA);  Surgeon: Savi Langford MD;  Location: [facility];  Service: Vascular    BYPASS FEMORAL-POPLITEAL Left 09/14/2021    Procedure: Left Common Femoral Below Knee to Popliteal Bypass with Insitu GSV graft.  Left Lower Extremity Angiogram;  Surgeon: Savi Langford MD;  " "Location: [facility];  Service: Vascular     SECTION      EAR SURGERY      IR LOWER EXTREMITY ANGIOGRAM  2021    IR LOWER EXTREMITY ANGIOGRAM  2021    AL SLCTV CATHJ 3RD+ ORD SLCTV ABDL PEL/LXTR BRNCH Left 3/24/2023    Procedure: Left leg angiogram;  Surgeon: Byron Wahl DO;  Location: [facility];  Service: Vascular    TUBAL LIGATION         Objective  Vitals:    24 1600 24 1602 24 1800   BP: 152/89     BP Location: Right arm     Pulse: 92  97   Resp: 18  18   Temp:  98.2 °F (36.8 °C)    TempSrc:  Oral    SpO2: 100%  100%   Weight: 126 kg (277 lb)     Height: 5' 1\" (1.549 m)         General: VSS, NAD, awake, alert.    Head: Normocephalic, atraumatic.  Eyes: PERRL, EOM-I.   ENT: Atraumatic external nose and ears.  MMM. No stridor.   Neck: Symmetric, supple, trachea midline.  CV: RRR. +S1/S2. No murmurs. Peripheral pulses +2 throughout.   Lungs: Respirations unlabored, no tachypnea. CTAB, lungs sounds equal bilateral.   Abd: soft, NT/ND. No guarding. No peritoneal signs.   MSK: Extremities without tenderness or gross deformity. No lower extremity edema.   Skin: Dry, intact. No lesions.  Neuro: AAOx3, GCS 15, CN II-XII grossly intact. Motor grossly intact. Sensory grossly intact.    Results Reviewed       Procedure Component Value Units Date/Time    Urine Microscopic [[id number]]  (Abnormal) Collected: 24    Lab Status: Final result Specimen: Urine, Other Updated: 24 1756     RBC, UA 4-10 /hpf      WBC, UA 1-2 /hpf      Epithelial Cells Occasional /hpf      Bacteria, UA Occasional /hpf      MUCUS THREADS Occasional    UA w Reflex to Microscopic w Reflex to Culture [[id number]]  (Abnormal) Collected: 24    Lab Status: Final result Specimen: Urine, Other Updated: 24 1745     Color, UA Yellow     Clarity, UA Clear     Specific Gravity, UA 1.031     pH, UA 6.0     Leukocytes, UA Negative     Nitrite, UA Negative     Protein, UA 30 (1+) mg/dl      " Glucose, UA Trace mg/dl      Ketones, UA Trace mg/dl      Urobilinogen, UA 8.0 mg/dl      Bilirubin, UA Negative     Occult Blood, UA Negative    POCT pregnancy, urine [[id number]]  (Normal) Resulted: 06/18/24 1710    Lab Status: Final result Updated: 06/18/24 1720     EXT Preg Test, Ur Negative     Control Valid    Comprehensive metabolic panel [[id number]]  (Abnormal) Collected: 06/18/24 1612    Lab Status: Final result Specimen: Blood from Arm, Right Updated: 06/18/24 1634     Sodium 137 mmol/L      Potassium 4.0 mmol/L      Chloride 105 mmol/L      CO2 26 mmol/L      ANION GAP 6 mmol/L      BUN 14 mg/dL      Creatinine 0.57 mg/dL      Glucose 156 mg/dL      Calcium 8.6 mg/dL      Corrected Calcium 9.2 mg/dL      AST 26 U/L      ALT 26 U/L      Alkaline Phosphatase 104 U/L      Total Protein 6.5 g/dL      Albumin 3.3 g/dL      Total Bilirubin 0.27 mg/dL      eGFR 118 ml/min/1.73sq m     Narrative:      National Kid[employer] Disease Foundation guidelines for Chronic Kidney Disease (CKD):     Stage 1 with normal or high GFR (GFR > 90 mL/min/1.73 square meters)    Stage 2 Mild CKD (GFR = 60-89 mL/min/1.73 square meters)    Stage 3A Moderate CKD (GFR = 45-59 mL/min/1.73 square meters)    Stage 3B Moderate CKD (GFR = 30-44 mL/min/1.73 square meters)    Stage 4 Severe CKD (GFR = 15-29 mL/min/1.73 square meters)    Stage 5 End Stage CKD (GFR <15 mL/min/1.73 square meters)  Note: GFR calculation is accurate only with a steady state creatinine    Lipase [[id number]]  (Normal) Collected: 06/18/24 1612    Lab Status: Final result Specimen: Blood from Arm, Right Updated: 06/18/24 1634     Lipase 16 u/L     CBC and differential [[id number]]  (Abnormal) Collected: 06/18/24 1612    Lab Status: Final result Specimen: Blood from Arm, Right Updated: 06/18/24 1618     WBC 11.39 Thousand/uL      RBC 4.92 Million/uL      Hemoglobin 12.7 g/dL      Hematocrit 35.9 %      MCV 73 fL      MCH 25.8 pg      MCHC 35.4 g/dL      RDW 15.9 %      MPV  10.4 fL      Platelets 371 Thousands/uL      nRBC 0 /100 WBCs      Segmented % 63 %      Immature Grans % 1 %      Lymphocytes % 30 %      Monocytes % 4 %      Eosinophils Relative 2 %      Basophils Relative 0 %      Absolute Neutrophils 7.22 Thousands/µL      Absolute Immature Grans 0.09 Thousand/uL      Absolute Lymphocytes 3.39 Thousands/µL      Absolute Monocytes 0.48 Thousand/µL      Eosinophils Absolute 0.17 Thousand/µL      Basophils Absolute 0.04 Thousands/µL           CT abdomen pelvis with contrast   Final Result by Serg Driscoll DO (06/18 1902)   No CT explanation for patient's symptoms.                  Workstation performed: [id number]           Medications   sodium chloride 0.9 % bolus 1,000 mL (1,000 mL Intravenous New Bag 6/18/24 1718)   ketorolac (TORADOL) injection 15 mg (15 mg Intravenous Given 6/18/24 1717)   droperidol (INAPSINE) injection 0.625 mg (0.625 mg Intravenous Given 6/18/24 1719)   iohexol (OMNIPAQUE) 350 MG/ML injection (MULTI-DOSE) 100 mL (100 mL Intravenous Given 6/18/24 1734)     ED Course as of 06/18/24 1914 Tue Jun 18, 2024   1650 LLQ abdominal pain starting yesterday. Pain is persistent, described as a squeezing sensation. 1 episode of emesis, 1 episode of loose stool this AM as well.    1745 Ketones, UA(!): Trace       MDM    Final Diagnosis:  1. Left lower quadrant abdominal pain    2. Elevated blood pressure reading      Critical Care Time  Procedures       acute distress.  Patient with mild generalized abdominal tenderness, worse in the left lower quadrant.  No rebound tenderness or guarding. Differential diagnosis includes, but is not limited to, gastroenteritis, appendicitis, UTI, diverticulitis, ectopic pregnancy, ovarian torsion, or other acute intraabdominal process.  Patient evaluated with CBC, CMP, lipase, urine pregnant, UA, and CT scan of the abdomen and pelvis.  IV fluids, Toradol, and droperidol given for symptomatic treatment.    Patient's lab workup unremarkable at this time.  Urine pregnancy test negative, urinalysis negative for signs of infection.  CT scan negative for acute intra-abdominal pathology.  On reevaluation, patient reports some improvement in symptoms.  States she is ready for discharge at this time.  Patient discharged home in stable condition with symptomatic care instructions and strict ED return precautions.    Final Diagnosis:  1. Left lower quadrant abdominal pain    2. Elevated blood pressure reading      Critical Care Time  Procedures

## 2024-06-19 ENCOUNTER — OFFICE VISIT (OUTPATIENT)
Dept: PHYSICAL THERAPY | Facility: CLINIC | Age: 39
End: 2024-06-19
Payer: COMMERCIAL

## 2024-06-19 DIAGNOSIS — Z89.612 S/P AKA (ABOVE KNEE AMPUTATION) UNILATERAL, LEFT (HCC): Primary | ICD-10-CM

## 2024-06-19 PROCEDURE — 97530 THERAPEUTIC ACTIVITIES: CPT | Performed by: PHYSICAL THERAPIST

## 2024-06-19 NOTE — PROGRESS NOTES
Daily Note     Today's date: 2024  Patient name: Tony Roberto  : 1985  MRN: [id number]  Referring provider: Fabienne Augustine MD  Dx:   Encounter Diagnosis     ICD-10-CM    1. S/P AKA (above knee amputation) unilateral, left ([facility])  Z89.612           Start Time: 1245  Stop Time: 1330  Total time in clinic (min): 45 minutes    Subjective: Wore her gel liner twice since last PT session. Unable to get her leg on despite trying.       Objective: See treatment diary below      Assessment: Patient requiring Max A to don gel liner. Patient initially with air pockets but this went away with proper donning of her gel liner. When attempting to don her prosthesis, patient has difficulty lining up the pin to the pin lock. Patient consistently to the left and was cued to initially slide to her right to center her. Patient was more centered however, unable to fully descend into the socket. Patient c/o groin pain that limited her from this. Unable to fully don prosthesis due to this reason. Will communicate this with her prosthetist for possible prosthetic adjustment.       Plan: Continue per plan of care.      POC expires Unit limit Auth Expiration date PT/OT/ST + Visit Limit?   10/4/24 N/A 3/5/25                              Visit/Unit Tracking  AUTH Status:  Date             Pending Used 1 2             Remaining                 Diagnosis: L TFA   Precautions: amputee   Insurance: [employer]             Vitals           Patient Ed           Volume control Wear  or prosthesis during the day Wear  or prosthesis during the day                    Ther Activity           Sock ply Pre: 0  Post: 0 Pre: 0  Post: 0         Don/doff 30 min  Patient requires max A to don gel liner, cuing to line up pin and to lock pin 45 min attempting to don prosthesis. Multiple adjustments to gel liner. Unable to don prosthesis due to pain in her groin                               Neuro Re-Ed            Transfers W/C to table  Independent    Sit to stand  5x, independent with RW          GT Supervision assist + SOLO + WC follow  10 ft x 3                                                                            Ther Ex           Aerobic conditioning                                                                             Manual                                                                  Modalities

## 2024-06-20 LAB
ATRIAL RATE: 85 BPM
P AXIS: 57 DEGREES
PR INTERVAL: 150 MS
QRS AXIS: 61 DEGREES
QRSD INTERVAL: 92 MS
QT INTERVAL: 378 MS
QTC INTERVAL: 449 MS
T WAVE AXIS: 12 DEGREES
VENTRICULAR RATE: 85 BPM

## 2024-06-20 PROCEDURE — 93010 ELECTROCARDIOGRAM REPORT: CPT | Performed by: INTERNAL MEDICINE

## 2024-06-21 ENCOUNTER — APPOINTMENT (OUTPATIENT)
Dept: PHYSICAL THERAPY | Facility: CLINIC | Age: 39
End: 2024-06-21
Payer: COMMERCIAL

## 2024-06-24 DIAGNOSIS — E11.42 TYPE 2 DIABETES MELLITUS WITH DIABETIC POLYNEUROPATHY, WITH LONG-TERM CURRENT USE OF INSULIN (HCC): ICD-10-CM

## 2024-06-24 DIAGNOSIS — Z79.4 TYPE 2 DIABETES MELLITUS WITH DIABETIC POLYNEUROPATHY, WITH LONG-TERM CURRENT USE OF INSULIN (HCC): ICD-10-CM

## 2024-06-24 RX ORDER — SYRING-NEEDL,DISP,INSUL,0.3 ML 28GX1/2"
SYRINGE, EMPTY DISPOSABLE MISCELLANEOUS
Qty: 100 EACH | Refills: 5 | Status: SHIPPED | OUTPATIENT
Start: 2024-06-24

## 2024-06-26 ENCOUNTER — APPOINTMENT (OUTPATIENT)
Dept: PHYSICAL THERAPY | Facility: CLINIC | Age: 39
End: 2024-06-26
Payer: COMMERCIAL

## 2024-06-28 ENCOUNTER — OFFICE VISIT (OUTPATIENT)
Dept: PHYSICAL THERAPY | Facility: CLINIC | Age: 39
End: 2024-06-28
Payer: COMMERCIAL

## 2024-06-28 DIAGNOSIS — Z89.612 S/P AKA (ABOVE KNEE AMPUTATION) UNILATERAL, LEFT (HCC): Primary | ICD-10-CM

## 2024-06-28 PROCEDURE — 97530 THERAPEUTIC ACTIVITIES: CPT | Performed by: PHYSICAL THERAPIST

## 2024-06-28 NOTE — PROGRESS NOTES
Daily Note     Today's date: 2024  Patient name: Tony Roberto  : 1985  MRN: [id number]  Referring provider: Fabienne Augustine MD  Dx:   Encounter Diagnosis     ICD-10-CM    1. S/P AKA (above knee amputation) unilateral, left (HCC)  Z89.612           Start Time: 1300  Stop Time: 1355  Total time in clinic (min): 55 minutes    Subjective: Edouard came to her house to help with prosthesis. Struggled to get in her leg but was able to lock it in a few times although she still was not all the way down. She was told to eat no salt, but has ate fried chicken and fries. Did not realize that had sodium in it.       Objective: See treatment diary below      Assessment: Unable to don prosthesis today due to patient's increase in limb volume. Patient able to descend lower in prosthesis but still not engaging pin lock. Reinforced compression via wearing her prosthesis or ace wrapping. Patient reporting not wearing prosthesis since seeing Edouard earlier this week. She reports trying to eat low sodium but verbalized poor understanding of high sodium foods which is most of her diet. Educated patient on how to read nutritional labels and to consider serving sizes. Distributed educational handouts regarding low sodium diet. Will continue to progress towards prosthetic training.      Plan: Continue per plan of care.      POC expires Unit limit Auth Expiration date PT/OT/ST + Visit Limit?   10/4/24 N/A 3/5/25                              Visit/Unit Tracking  AUTH Status:  Date            Pending Used 1 2 3            Remaining                 Diagnosis: L TFA   Precautions: amputee   Insurance: [employer]            Vitals           Patient Ed           Volume control Wear  or prosthesis during the day Wear  or prosthesis during the day Wear  or prosthesis during the day        Diet   Low sodium for limb volume control                   Ther Activity            Sock ply Pre: 0  Post: 0 Pre: 0  Post: 0 Pre: 0  Post: 0        Don/doff 30 min  Patient requires max A to don gel liner, cuing to line up pin and to lock pin 45 min attempting to don prosthesis. Multiple adjustments to gel liner. Unable to don prosthesis due to pain in her groin 40 min attempting to don prosthesis. Multiple adjustments to gel liner. Unable to don prosthesis due to pain in her groin                              Neuro Re-Ed           Transfers W/C to table  Independent    Sit to stand  5x, independent with RW          GT Supervision assist + SOLO + WC follow  10 ft x 3                                                                            Ther Ex           Aerobic conditioning                                                                             Manual                                                                  Modalities

## 2024-07-02 ENCOUNTER — TELEPHONE (OUTPATIENT)
Dept: PHYSICAL THERAPY | Facility: REHABILITATION | Age: 39
End: 2024-07-02

## 2024-07-02 NOTE — TELEPHONE ENCOUNTER
Patient called regarding having blisters on her residual limb. Patient has not been wearing her prosthesis or gel liner - therefore not due to this reason. Possibly due to poor diabetes control as patient reports higher glucose levels the past few days. Recommend f/u with family medicine.

## 2024-07-05 ENCOUNTER — TELEPHONE (OUTPATIENT)
Dept: PHYSICAL THERAPY | Facility: CLINIC | Age: 39
End: 2024-07-05

## 2024-07-05 NOTE — TELEPHONE ENCOUNTER
Patient called to cancel same day appt due to eating too much salt the day before and not controlling her limb volume to wear her prosthesis. Left patient voice mail that given her multiple same day cancels, if she cancels one more time, her remaining PT appointments will be canceled. Patient has not shown consistency in interventions to maintain her limb volume to fit in her prosthesis. Patient has voiced in other phone calls that she is not wearing her gel liner or prosthesis and is not watching her salt intake, therefore not following instructions from PT and prosthetist to decrease her limb volume to fit in her prosthesis. Made patient aware of her following appt on 7/10 at 11 am. Will cancel remaining appts if she does cancel.

## 2024-07-10 ENCOUNTER — TELEPHONE (OUTPATIENT)
Dept: PHYSICAL THERAPY | Facility: CLINIC | Age: 39
End: 2024-07-10

## 2024-07-10 NOTE — TELEPHONE ENCOUNTER
Left voice mail to patient that her remaining PT appts with me canceled due to her poor attendance (multiple NS and late cancels).

## 2024-07-24 DIAGNOSIS — I10 HYPERTENSION: ICD-10-CM

## 2024-07-24 DIAGNOSIS — I73.9 PAD (PERIPHERAL ARTERY DISEASE) (HCC): ICD-10-CM

## 2024-07-24 DIAGNOSIS — J44.9 CHRONIC OBSTRUCTIVE PULMONARY DISEASE, UNSPECIFIED COPD TYPE (HCC): ICD-10-CM

## 2024-07-24 RX ORDER — ALBUTEROL SULFATE 90 UG/1
AEROSOL, METERED RESPIRATORY (INHALATION)
Qty: 8.5 G | Refills: 5 | Status: SHIPPED | OUTPATIENT
Start: 2024-07-24

## 2024-07-24 RX ORDER — LISINOPRIL 20 MG/1
20 TABLET ORAL DAILY
Qty: 100 TABLET | Refills: 1 | Status: SHIPPED | OUTPATIENT
Start: 2024-07-24

## 2024-07-25 RX ORDER — ATORVASTATIN CALCIUM 40 MG/1
40 TABLET, FILM COATED ORAL
Qty: 30 TABLET | Refills: 0 | Status: SHIPPED | OUTPATIENT
Start: 2024-07-25

## 2024-07-25 NOTE — TELEPHONE ENCOUNTER
Pt is due for follow up, please see if we can get her on the books for an appointment. She also has some labwork ordered to be done. Will send for 1 month refill at this time. Thanks!

## 2024-07-25 NOTE — TELEPHONE ENCOUNTER
Pt returning call from office; relayed the message that she is due for a follow up and pt said she would call back.

## 2024-07-30 ENCOUNTER — TELEPHONE (OUTPATIENT)
Dept: FAMILY MEDICINE CLINIC | Facility: CLINIC | Age: 39
End: 2024-07-30

## 2024-07-30 NOTE — TELEPHONE ENCOUNTER
Encounter for forms      Patient requires a form to be completed.  Patient is aware of 7-10 day turn around time.    Please refer to the following information:       Type of Form: [employer]    Date of Visit (if applicable):     Doctor: dr blair     Expected date: timely manner     How patient would like to receive form: when completed please fax    Fax number: [phone]      Patient phone number:       Copy scanned to encounter. Copy provided to patient. Original in (X) team folder to be completed.

## 2024-07-30 NOTE — TELEPHONE ENCOUNTER
Paperwork needs to be completed by 8/1 due to the electric being shut off. Dr Johnson is not in office until 8/1. Would you please be able to fill out paperwork today so MR can fax it over asa.     Lopez is in rossy folder

## 2024-07-30 NOTE — TELEPHONE ENCOUNTER
Paperwork was completed and faxed over to [employer]/     Both pw and fax was scanned into the chart and the patient was called to inform that the paperwork was completed and faxed

## 2024-07-31 ENCOUNTER — RA CDI HCC (OUTPATIENT)
Dept: OTHER | Facility: HOSPITAL | Age: 39
End: 2024-07-31

## 2024-07-31 DIAGNOSIS — I73.9 PERIPHERAL ARTERIAL DISEASE (HCC): Primary | ICD-10-CM

## 2024-07-31 DIAGNOSIS — E11.42 TYPE 2 DIABETES MELLITUS WITH DIABETIC POLYNEUROPATHY, WITH LONG-TERM CURRENT USE OF INSULIN (HCC): ICD-10-CM

## 2024-07-31 DIAGNOSIS — Z89.612 S/P AKA (ABOVE KNEE AMPUTATION) UNILATERAL, LEFT (HCC): ICD-10-CM

## 2024-07-31 DIAGNOSIS — Z79.4 TYPE 2 DIABETES MELLITUS WITH DIABETIC POLYNEUROPATHY, WITH LONG-TERM CURRENT USE OF INSULIN (HCC): ICD-10-CM

## 2024-07-31 PROBLEM — T81.31XS SURGICAL WOUND BREAKDOWN, SEQUELA: Status: RESOLVED | Noted: 2021-10-28 | Resolved: 2024-07-31

## 2024-07-31 PROBLEM — R06.03 ACUTE RESPIRATORY DISTRESS: Status: RESOLVED | Noted: 2023-03-24 | Resolved: 2024-07-31

## 2024-07-31 PROBLEM — G89.18 POSTOPERATIVE PAIN: Status: RESOLVED | Noted: 2023-04-16 | Resolved: 2024-07-31

## 2024-07-31 NOTE — PROGRESS NOTES
HCC coding opportunities          Chart Reviewed number of suggestions sent to Provider: 2  E11.51  E11.65     Patients Insurance     Medicare Insurance: [employer]

## 2024-08-02 DIAGNOSIS — F31.75 BIPOLAR DISORDER, IN PARTIAL REMISSION, MOST RECENT EPISODE DEPRESSED (HCC): ICD-10-CM

## 2024-08-02 RX ORDER — DULOXETIN HYDROCHLORIDE 30 MG/1
60 CAPSULE, DELAYED RELEASE ORAL DAILY
Qty: 60 CAPSULE | Refills: 0 | Status: SHIPPED | OUTPATIENT
Start: 2024-08-02

## 2024-08-06 DIAGNOSIS — I73.9 PAD (PERIPHERAL ARTERY DISEASE) (HCC): ICD-10-CM

## 2024-08-07 RX ORDER — ATORVASTATIN CALCIUM 40 MG/1
40 TABLET, FILM COATED ORAL
Qty: 30 TABLET | Refills: 5 | Status: SHIPPED | OUTPATIENT
Start: 2024-08-07

## 2024-08-11 ENCOUNTER — APPOINTMENT (EMERGENCY)
Dept: RADIOLOGY | Facility: HOSPITAL | Age: 39
DRG: 300 | End: 2024-08-11
Payer: COMMERCIAL

## 2024-08-11 ENCOUNTER — APPOINTMENT (EMERGENCY)
Dept: CT IMAGING | Facility: HOSPITAL | Age: 39
DRG: 300 | End: 2024-08-11
Payer: COMMERCIAL

## 2024-08-11 ENCOUNTER — HOSPITAL ENCOUNTER (INPATIENT)
Facility: HOSPITAL | Age: 39
LOS: 2 days | Discharge: HOME | DRG: 300 | End: 2024-08-13
Attending: EMERGENCY MEDICINE | Admitting: INTERNAL MEDICINE
Payer: COMMERCIAL

## 2024-08-11 ENCOUNTER — APPOINTMENT (EMERGENCY)
Dept: VASCULAR ULTRASOUND | Facility: HOSPITAL | Age: 39
DRG: 300 | End: 2024-08-11
Payer: COMMERCIAL

## 2024-08-11 DIAGNOSIS — R03.0 ELEVATED BLOOD PRESSURE READING: ICD-10-CM

## 2024-08-11 DIAGNOSIS — I73.9 PERIPHERAL VASCULAR DISEASE (HCC): Primary | ICD-10-CM

## 2024-08-11 DIAGNOSIS — M79.604 ACUTE LEG PAIN, RIGHT: ICD-10-CM

## 2024-08-11 DIAGNOSIS — E11.42 TYPE 2 DIABETES MELLITUS WITH DIABETIC POLYNEUROPATHY, WITH LONG-TERM CURRENT USE OF INSULIN (HCC): ICD-10-CM

## 2024-08-11 DIAGNOSIS — Z79.4 TYPE 2 DIABETES MELLITUS WITH DIABETIC POLYNEUROPATHY, WITH LONG-TERM CURRENT USE OF INSULIN (HCC): ICD-10-CM

## 2024-08-11 PROBLEM — E66.01 CLASS 3 SEVERE OBESITY DUE TO EXCESS CALORIES WITHOUT SERIOUS COMORBIDITY WITH BODY MASS INDEX (BMI) OF 45.0 TO 49.9 IN ADULT (HCC): Chronic | Status: ACTIVE | Noted: 2022-06-27

## 2024-08-11 PROBLEM — Z72.0 TOBACCO ABUSE: Status: ACTIVE | Noted: 2024-08-11

## 2024-08-11 PROBLEM — E66.813 CLASS 3 SEVERE OBESITY DUE TO EXCESS CALORIES WITHOUT SERIOUS COMORBIDITY WITH BODY MASS INDEX (BMI) OF 45.0 TO 49.9 IN ADULT (HCC): Chronic | Status: ACTIVE | Noted: 2022-06-27

## 2024-08-11 LAB
2HR DELTA HS TROPONIN: -1 NG/L
4HR DELTA HS TROPONIN: 0 NG/L
ALBUMIN SERPL BCG-MCNC: 3.5 G/DL (ref 3.5–5)
ALP SERPL-CCNC: 102 U/L (ref 34–104)
ALT SERPL W P-5'-P-CCNC: 15 U/L (ref 7–52)
ANION GAP SERPL CALCULATED.3IONS-SCNC: 7 MMOL/L (ref 4–13)
APTT PPP: 27 SECONDS (ref 23–34)
APTT PPP: 27 SECONDS (ref 23–34)
APTT PPP: >210 SECONDS (ref 23–34)
AST SERPL W P-5'-P-CCNC: 8 U/L (ref 13–39)
ATRIAL RATE: 70 BPM
BACTERIA UR QL AUTO: ABNORMAL /HPF
BASOPHILS # BLD AUTO: 0.04 THOUSANDS/ÂΜL (ref 0–0.1)
BASOPHILS NFR BLD AUTO: 0 % (ref 0–1)
BILIRUB SERPL-MCNC: 0.33 MG/DL (ref 0.2–1)
BILIRUB UR QL STRIP: NEGATIVE
BUN SERPL-MCNC: 11 MG/DL (ref 5–25)
CALCIUM SERPL-MCNC: 8.7 MG/DL (ref 8.4–10.2)
CARDIAC TROPONIN I PNL SERPL HS: 3 NG/L
CARDIAC TROPONIN I PNL SERPL HS: 4 NG/L
CARDIAC TROPONIN I PNL SERPL HS: 4 NG/L
CHLORIDE SERPL-SCNC: 104 MMOL/L (ref 96–108)
CLARITY UR: CLEAR
CO2 SERPL-SCNC: 24 MMOL/L (ref 21–32)
COLOR UR: ABNORMAL
CREAT SERPL-MCNC: 0.43 MG/DL (ref 0.6–1.3)
EOSINOPHIL # BLD AUTO: 0.18 THOUSAND/ÂΜL (ref 0–0.61)
EOSINOPHIL NFR BLD AUTO: 2 % (ref 0–6)
ERYTHROCYTE [DISTWIDTH] IN BLOOD BY AUTOMATED COUNT: 15.3 % (ref 11.6–15.1)
GFR SERPL CREATININE-BSD FRML MDRD: 130 ML/MIN/1.73SQ M
GLUCOSE SERPL-MCNC: 210 MG/DL (ref 65–140)
GLUCOSE SERPL-MCNC: 210 MG/DL (ref 65–140)
GLUCOSE SERPL-MCNC: 344 MG/DL (ref 65–140)
GLUCOSE SERPL-MCNC: 417 MG/DL (ref 65–140)
GLUCOSE UR STRIP-MCNC: ABNORMAL MG/DL
HCG SERPL QL: NEGATIVE
HCT VFR BLD AUTO: 37 % (ref 34.8–46.1)
HGB BLD-MCNC: 13.1 G/DL (ref 11.5–15.4)
HGB UR QL STRIP.AUTO: NEGATIVE
IMM GRANULOCYTES # BLD AUTO: 0.03 THOUSAND/UL (ref 0–0.2)
IMM GRANULOCYTES NFR BLD AUTO: 0 % (ref 0–2)
INR PPP: 0.94 (ref 0.85–1.19)
KETONES UR STRIP-MCNC: NEGATIVE MG/DL
LEUKOCYTE ESTERASE UR QL STRIP: ABNORMAL
LYMPHOCYTES # BLD AUTO: 3.41 THOUSANDS/ÂΜL (ref 0.6–4.47)
LYMPHOCYTES NFR BLD AUTO: 35 % (ref 14–44)
MAGNESIUM SERPL-MCNC: 1.8 MG/DL (ref 1.9–2.7)
MCH RBC QN AUTO: 25.6 PG (ref 26.8–34.3)
MCHC RBC AUTO-ENTMCNC: 35.4 G/DL (ref 31.4–37.4)
MCV RBC AUTO: 72 FL (ref 82–98)
MONOCYTES # BLD AUTO: 0.53 THOUSAND/ÂΜL (ref 0.17–1.22)
MONOCYTES NFR BLD AUTO: 6 % (ref 4–12)
NEUTROPHILS # BLD AUTO: 5.44 THOUSANDS/ÂΜL (ref 1.85–7.62)
NEUTS SEG NFR BLD AUTO: 57 % (ref 43–75)
NITRITE UR QL STRIP: NEGATIVE
NON-SQ EPI CELLS URNS QL MICRO: ABNORMAL /HPF
NRBC BLD AUTO-RTO: 0 /100 WBCS
P AXIS: 27 DEGREES
PH UR STRIP.AUTO: 6 [PH]
PLATELET # BLD AUTO: 398 THOUSANDS/UL (ref 149–390)
PMV BLD AUTO: 10.1 FL (ref 8.9–12.7)
POTASSIUM SERPL-SCNC: 3.4 MMOL/L (ref 3.5–5.3)
PR INTERVAL: 166 MS
PROT SERPL-MCNC: 6.7 G/DL (ref 6.4–8.4)
PROT UR STRIP-MCNC: NEGATIVE MG/DL
PROTHROMBIN TIME: 13.3 SECONDS (ref 12.3–15)
QRS AXIS: 66 DEGREES
QRSD INTERVAL: 88 MS
QT INTERVAL: 382 MS
QTC INTERVAL: 412 MS
RBC # BLD AUTO: 5.11 MILLION/UL (ref 3.81–5.12)
RBC #/AREA URNS AUTO: ABNORMAL /HPF
SODIUM SERPL-SCNC: 135 MMOL/L (ref 135–147)
SP GR UR STRIP.AUTO: >=1.05 (ref 1–1.03)
T WAVE AXIS: 5 DEGREES
UROBILINOGEN UR STRIP-ACNC: <2 MG/DL
VENTRICULAR RATE: 70 BPM
WBC # BLD AUTO: 9.63 THOUSAND/UL (ref 4.31–10.16)
WBC #/AREA URNS AUTO: ABNORMAL /HPF

## 2024-08-11 PROCEDURE — 99223 1ST HOSP IP/OBS HIGH 75: CPT | Performed by: INTERNAL MEDICINE

## 2024-08-11 PROCEDURE — 85610 PROTHROMBIN TIME: CPT | Performed by: EMERGENCY MEDICINE

## 2024-08-11 PROCEDURE — 99291 CRITICAL CARE FIRST HOUR: CPT | Performed by: EMERGENCY MEDICINE

## 2024-08-11 PROCEDURE — 81001 URINALYSIS AUTO W/SCOPE: CPT | Performed by: INTERNAL MEDICINE

## 2024-08-11 PROCEDURE — 82948 REAGENT STRIP/BLOOD GLUCOSE: CPT

## 2024-08-11 PROCEDURE — 96375 TX/PRO/DX INJ NEW DRUG ADDON: CPT

## 2024-08-11 PROCEDURE — 93926 LOWER EXTREMITY STUDY: CPT | Performed by: SURGERY

## 2024-08-11 PROCEDURE — 84484 ASSAY OF TROPONIN QUANT: CPT | Performed by: EMERGENCY MEDICINE

## 2024-08-11 PROCEDURE — 93926 LOWER EXTREMITY STUDY: CPT

## 2024-08-11 PROCEDURE — 96376 TX/PRO/DX INJ SAME DRUG ADON: CPT

## 2024-08-11 PROCEDURE — 36415 COLL VENOUS BLD VENIPUNCTURE: CPT | Performed by: EMERGENCY MEDICINE

## 2024-08-11 PROCEDURE — 99284 EMERGENCY DEPT VISIT MOD MDM: CPT

## 2024-08-11 PROCEDURE — 75635 CT ANGIO ABDOMINAL ARTERIES: CPT

## 2024-08-11 PROCEDURE — 84484 ASSAY OF TROPONIN QUANT: CPT | Performed by: INTERNAL MEDICINE

## 2024-08-11 PROCEDURE — 84703 CHORIONIC GONADOTROPIN ASSAY: CPT | Performed by: EMERGENCY MEDICINE

## 2024-08-11 PROCEDURE — 71045 X-RAY EXAM CHEST 1 VIEW: CPT

## 2024-08-11 PROCEDURE — 93010 ELECTROCARDIOGRAM REPORT: CPT | Performed by: INTERNAL MEDICINE

## 2024-08-11 PROCEDURE — 93005 ELECTROCARDIOGRAM TRACING: CPT

## 2024-08-11 PROCEDURE — 85730 THROMBOPLASTIN TIME PARTIAL: CPT | Performed by: EMERGENCY MEDICINE

## 2024-08-11 PROCEDURE — 96365 THER/PROPH/DIAG IV INF INIT: CPT

## 2024-08-11 PROCEDURE — 85730 THROMBOPLASTIN TIME PARTIAL: CPT | Performed by: INTERNAL MEDICINE

## 2024-08-11 PROCEDURE — 85025 COMPLETE CBC W/AUTO DIFF WBC: CPT | Performed by: EMERGENCY MEDICINE

## 2024-08-11 PROCEDURE — 80053 COMPREHEN METABOLIC PANEL: CPT | Performed by: EMERGENCY MEDICINE

## 2024-08-11 PROCEDURE — 93922 UPR/L XTREMITY ART 2 LEVELS: CPT | Performed by: SURGERY

## 2024-08-11 PROCEDURE — 83735 ASSAY OF MAGNESIUM: CPT | Performed by: INTERNAL MEDICINE

## 2024-08-11 RX ORDER — MAGNESIUM SULFATE HEPTAHYDRATE 40 MG/ML
2 INJECTION, SOLUTION INTRAVENOUS ONCE
Status: COMPLETED | OUTPATIENT
Start: 2024-08-11 | End: 2024-08-11

## 2024-08-11 RX ORDER — HYDROMORPHONE HCL IN WATER/PF 6 MG/30 ML
0.2 PATIENT CONTROLLED ANALGESIA SYRINGE INTRAVENOUS EVERY 4 HOURS PRN
Status: DISCONTINUED | OUTPATIENT
Start: 2024-08-11 | End: 2024-08-13

## 2024-08-11 RX ORDER — ONDANSETRON 2 MG/ML
4 INJECTION INTRAMUSCULAR; INTRAVENOUS EVERY 6 HOURS PRN
Status: DISCONTINUED | OUTPATIENT
Start: 2024-08-11 | End: 2024-08-13 | Stop reason: HOSPADM

## 2024-08-11 RX ORDER — FLUTICASONE FUROATE AND VILANTEROL 100; 25 UG/1; UG/1
1 POWDER RESPIRATORY (INHALATION)
Status: DISCONTINUED | OUTPATIENT
Start: 2024-08-11 | End: 2024-08-13 | Stop reason: HOSPADM

## 2024-08-11 RX ORDER — FENTANYL CITRATE 50 UG/ML
50 INJECTION, SOLUTION INTRAMUSCULAR; INTRAVENOUS ONCE
Status: COMPLETED | OUTPATIENT
Start: 2024-08-11 | End: 2024-08-11

## 2024-08-11 RX ORDER — DULOXETIN HYDROCHLORIDE 60 MG/1
60 CAPSULE, DELAYED RELEASE ORAL DAILY
Status: DISCONTINUED | OUTPATIENT
Start: 2024-08-11 | End: 2024-08-13 | Stop reason: HOSPADM

## 2024-08-11 RX ORDER — ASPIRIN 81 MG/1
81 TABLET, CHEWABLE ORAL DAILY
Status: DISCONTINUED | OUTPATIENT
Start: 2024-08-11 | End: 2024-08-13 | Stop reason: HOSPADM

## 2024-08-11 RX ORDER — TOPIRAMATE 25 MG/1
25 TABLET, FILM COATED ORAL 2 TIMES DAILY
Status: DISCONTINUED | OUTPATIENT
Start: 2024-08-11 | End: 2024-08-13 | Stop reason: HOSPADM

## 2024-08-11 RX ORDER — INSULIN GLARGINE 100 [IU]/ML
30 INJECTION, SOLUTION SUBCUTANEOUS
Status: DISCONTINUED | OUTPATIENT
Start: 2024-08-11 | End: 2024-08-12

## 2024-08-11 RX ORDER — ACETAMINOPHEN 325 MG/1
650 TABLET ORAL ONCE
Status: COMPLETED | OUTPATIENT
Start: 2024-08-11 | End: 2024-08-11

## 2024-08-11 RX ORDER — INSULIN LISPRO 100 [IU]/ML
1-6 INJECTION, SOLUTION INTRAVENOUS; SUBCUTANEOUS
Status: DISCONTINUED | OUTPATIENT
Start: 2024-08-11 | End: 2024-08-13 | Stop reason: HOSPADM

## 2024-08-11 RX ORDER — LIDOCAINE 50 MG/G
1 PATCH TOPICAL ONCE
Status: COMPLETED | OUTPATIENT
Start: 2024-08-11 | End: 2024-08-11

## 2024-08-11 RX ORDER — HYDROXYZINE HYDROCHLORIDE 50 MG/1
50 TABLET, FILM COATED ORAL
Status: DISCONTINUED | OUTPATIENT
Start: 2024-08-11 | End: 2024-08-13 | Stop reason: HOSPADM

## 2024-08-11 RX ORDER — HYDROMORPHONE HCL/PF 1 MG/ML
0.5 SYRINGE (ML) INJECTION EVERY 4 HOURS PRN
Status: DISCONTINUED | OUTPATIENT
Start: 2024-08-11 | End: 2024-08-13

## 2024-08-11 RX ORDER — ALBUTEROL SULFATE 90 UG/1
2 AEROSOL, METERED RESPIRATORY (INHALATION) EVERY 4 HOURS PRN
Status: DISCONTINUED | OUTPATIENT
Start: 2024-08-11 | End: 2024-08-13 | Stop reason: HOSPADM

## 2024-08-11 RX ORDER — PRAZOSIN HYDROCHLORIDE 1 MG/1
1 CAPSULE ORAL
Status: DISCONTINUED | OUTPATIENT
Start: 2024-08-11 | End: 2024-08-13 | Stop reason: HOSPADM

## 2024-08-11 RX ORDER — LISINOPRIL 20 MG/1
20 TABLET ORAL DAILY
Status: DISCONTINUED | OUTPATIENT
Start: 2024-08-11 | End: 2024-08-13 | Stop reason: HOSPADM

## 2024-08-11 RX ORDER — OXYCODONE HYDROCHLORIDE 5 MG/1
5 TABLET ORAL EVERY 4 HOURS PRN
Status: DISCONTINUED | OUTPATIENT
Start: 2024-08-11 | End: 2024-08-13 | Stop reason: HOSPADM

## 2024-08-11 RX ORDER — NICOTINE 21 MG/24HR
1 PATCH, TRANSDERMAL 24 HOURS TRANSDERMAL DAILY
Status: DISCONTINUED | OUTPATIENT
Start: 2024-08-11 | End: 2024-08-13 | Stop reason: HOSPADM

## 2024-08-11 RX ORDER — ACETAMINOPHEN 325 MG/1
975 TABLET ORAL EVERY 8 HOURS SCHEDULED
Status: DISCONTINUED | OUTPATIENT
Start: 2024-08-11 | End: 2024-08-13 | Stop reason: HOSPADM

## 2024-08-11 RX ORDER — GABAPENTIN 300 MG/1
300 CAPSULE ORAL ONCE
Status: COMPLETED | OUTPATIENT
Start: 2024-08-11 | End: 2024-08-11

## 2024-08-11 RX ORDER — POTASSIUM CHLORIDE 1500 MG/1
20 TABLET, EXTENDED RELEASE ORAL ONCE
Status: COMPLETED | OUTPATIENT
Start: 2024-08-11 | End: 2024-08-11

## 2024-08-11 RX ORDER — INSULIN LISPRO 100 [IU]/ML
1-5 INJECTION, SOLUTION INTRAVENOUS; SUBCUTANEOUS
Status: DISCONTINUED | OUTPATIENT
Start: 2024-08-11 | End: 2024-08-13 | Stop reason: HOSPADM

## 2024-08-11 RX ORDER — ATORVASTATIN CALCIUM 40 MG/1
40 TABLET, FILM COATED ORAL
Status: DISCONTINUED | OUTPATIENT
Start: 2024-08-11 | End: 2024-08-13 | Stop reason: HOSPADM

## 2024-08-11 RX ORDER — INSULIN GLARGINE 100 [IU]/ML
30 INJECTION, SOLUTION SUBCUTANEOUS EVERY MORNING
Status: DISCONTINUED | OUTPATIENT
Start: 2024-08-11 | End: 2024-08-13 | Stop reason: HOSPADM

## 2024-08-11 RX ORDER — HEPARIN SODIUM 10000 [USP'U]/100ML
3-30 INJECTION, SOLUTION INTRAVENOUS
Status: DISCONTINUED | OUTPATIENT
Start: 2024-08-11 | End: 2024-08-12

## 2024-08-11 RX ADMIN — INSULIN GLARGINE 30 UNITS: 100 INJECTION, SOLUTION SUBCUTANEOUS at 13:42

## 2024-08-11 RX ADMIN — IOHEXOL 100 ML: 350 INJECTION, SOLUTION INTRAVENOUS at 10:59

## 2024-08-11 RX ADMIN — INSULIN LISPRO 4 UNITS: 100 INJECTION, SOLUTION INTRAVENOUS; SUBCUTANEOUS at 21:27

## 2024-08-11 RX ADMIN — GABAPENTIN 300 MG: 300 CAPSULE ORAL at 08:18

## 2024-08-11 RX ADMIN — HYDROMORPHONE HYDROCHLORIDE 0.5 MG: 1 INJECTION, SOLUTION INTRAMUSCULAR; INTRAVENOUS; SUBCUTANEOUS at 19:00

## 2024-08-11 RX ADMIN — FENTANYL CITRATE 50 MCG: 50 INJECTION INTRAMUSCULAR; INTRAVENOUS at 10:40

## 2024-08-11 RX ADMIN — HYDROMORPHONE HYDROCHLORIDE 0.5 MG: 1 INJECTION, SOLUTION INTRAMUSCULAR; INTRAVENOUS; SUBCUTANEOUS at 23:05

## 2024-08-11 RX ADMIN — MAGNESIUM SULFATE HEPTAHYDRATE 2 G: 40 INJECTION, SOLUTION INTRAVENOUS at 19:33

## 2024-08-11 RX ADMIN — ASPIRIN 81 MG 81 MG: 81 TABLET ORAL at 13:39

## 2024-08-11 RX ADMIN — PRAZOSIN HYDROCHLORIDE 1 MG: 1 CAPSULE ORAL at 21:26

## 2024-08-11 RX ADMIN — HYDROMORPHONE HYDROCHLORIDE 0.5 MG: 1 INJECTION, SOLUTION INTRAMUSCULAR; INTRAVENOUS; SUBCUTANEOUS at 12:33

## 2024-08-11 RX ADMIN — LISINOPRIL 20 MG: 20 TABLET ORAL at 13:39

## 2024-08-11 RX ADMIN — DULOXETINE HYDROCHLORIDE 60 MG: 60 CAPSULE, DELAYED RELEASE ORAL at 16:01

## 2024-08-11 RX ADMIN — OXYCODONE HYDROCHLORIDE 5 MG: 5 TABLET ORAL at 12:33

## 2024-08-11 RX ADMIN — FENTANYL CITRATE 50 MCG: 50 INJECTION INTRAMUSCULAR; INTRAVENOUS at 11:30

## 2024-08-11 RX ADMIN — HYDROMORPHONE HYDROCHLORIDE 0.2 MG: 0.2 INJECTION, SOLUTION INTRAMUSCULAR; INTRAVENOUS; SUBCUTANEOUS at 15:19

## 2024-08-11 RX ADMIN — POTASSIUM CHLORIDE 20 MEQ: 1500 TABLET, EXTENDED RELEASE ORAL at 13:39

## 2024-08-11 RX ADMIN — INSULIN LISPRO 2 UNITS: 100 INJECTION, SOLUTION INTRAVENOUS; SUBCUTANEOUS at 13:42

## 2024-08-11 RX ADMIN — ACETAMINOPHEN 975 MG: 325 TABLET, FILM COATED ORAL at 13:39

## 2024-08-11 RX ADMIN — TOPIRAMATE 25 MG: 25 TABLET, FILM COATED ORAL at 15:55

## 2024-08-11 RX ADMIN — ACETAMINOPHEN 975 MG: 325 TABLET, FILM COATED ORAL at 21:26

## 2024-08-11 RX ADMIN — NICOTINE 1 PATCH: 14 PATCH, EXTENDED RELEASE TRANSDERMAL at 13:38

## 2024-08-11 RX ADMIN — FENTANYL CITRATE 50 MCG: 50 INJECTION INTRAMUSCULAR; INTRAVENOUS at 09:26

## 2024-08-11 RX ADMIN — INSULIN GLARGINE 30 UNITS: 100 INJECTION, SOLUTION SUBCUTANEOUS at 21:27

## 2024-08-11 RX ADMIN — HEPARIN SODIUM 18 UNITS/KG/HR: 10000 INJECTION, SOLUTION INTRAVENOUS at 11:36

## 2024-08-11 RX ADMIN — LIDOCAINE 5% 1 PATCH: 700 PATCH TOPICAL at 08:18

## 2024-08-11 RX ADMIN — INSULIN LISPRO 5 UNITS: 100 INJECTION, SOLUTION INTRAVENOUS; SUBCUTANEOUS at 17:09

## 2024-08-11 RX ADMIN — ATORVASTATIN CALCIUM 40 MG: 40 TABLET, FILM COATED ORAL at 15:55

## 2024-08-11 RX ADMIN — ACETAMINOPHEN 650 MG: 325 TABLET, FILM COATED ORAL at 08:18

## 2024-08-11 RX ADMIN — HYDROMORPHONE HYDROCHLORIDE 0.2 MG: 0.2 INJECTION, SOLUTION INTRAMUSCULAR; INTRAVENOUS; SUBCUTANEOUS at 21:27

## 2024-08-11 NOTE — ASSESSMENT & PLAN NOTE
Continue home meds Duloxetine 60 mg daily, Hydroxyzine 50 mg at bedtime as needed, Topamax 25 mg twice daily

## 2024-08-11 NOTE — ED NOTES
Patient still reporting significant pain after initial pain medication. Provider notified.     Rosalina Bhakta RN  08/11/24 0975

## 2024-08-11 NOTE — TELEMEDICINE
e-Consult (IPC)   Tony Roberto 38 y.o. female MRN: [id number]  Unit/Bed#: ED-28 Encounter: [id number]      Reason for Consult / Principal Problem: RLE pain  Inpatient consult to Vascular Surgery  Consult performed by: EMILE Perry  Consult ordered by: Nabeel Quiñonez MD  Reason for consult: RLE numbness/ tingling        08/11/24    38-year-old female current smoker, non-ambulatory with PMHx Obesity (BMI 47.49kg/m), T2 DM (HgA1c 9.9), asthma, COPD, bipolar 1 disorder, HTN, PAD w/ hx of CLTI w/  hx of occluded left fem-BK pop BPG and nonsalvagable LLE s/p L AKA by Dr.Maggie Langford 3/29/23 presents to the ED 8/11 with RLE numbness and tingling ongoing for 4 days.     Labs  Cr 0.43/ eGFR 130  K 3.4  Mg 1.8    HgA1c 9.9 (8/25/24)    Imaging  8/11/24 LEAD  Rt: 50-75% distal SFA  and prox popliteal stenosis  NIKKI 0.79/ /     8/11/24 CTA abdomen/ pelvis significant for:   Focal greater than 70% stenosis of the distal right SFA, 50 to 60% stenosis AK right popliteal artery. Three-vessel runoff in the right calf      ASSESSMENT:  Pt was not seen or assessed by myself. Information obtained through chart review.     RECOMMENDATIONS:  -LEAD reviewed, NIKKI and GTP no significant change in study. Denies claudication per staff, pt reports numbness and tingling. No wounds/ tissue loss.  Pt is currently non-ambulatory, smoker. Recommending short interval office visit for review of study. Reached out to the office to arrange. No plans for any vascular intervention.   -Smoking cessation  -Heparin gtt currently, transition to Xarelto 20mg when appropriate. Continue with ASA daily.  -Continue with atorvastatin daily.  -Pain control per primary care team.   -D/W Dr.Maggie Langford  -D/W      21-30 minutes, >50% of the total time devoted to medical consultative verbal/EMR discussion between providers. Written report will be generated in the EMR.    EMILE Perry  [facility]  [place] [phone]

## 2024-08-11 NOTE — ASSESSMENT & PLAN NOTE
"Presents with severe right foot pain since 4 days  History of PAD s/p left AKA on ASA 81 mg daily, atorvastatin 40 mg daily and Xarelto 20 mg daily  CTA abdominal with runoff - \"Mild atherosclerotic irregularity of the left external iliac artery with estimated stenosis of less than 30%. Focal greater than 70% stenosis of the distal right superficial femoral artery at the level of the abductor canal for a distance of 1.5 cm. Additional 50 to 60% stenosis of the above-the-knee right popliteal artery 5 cm superior to the knee joint for a distance of 0.6 cm. Three-vessel runoff in the right calf with mild atherosclerotic irregularity of the proximal peroneal and posterior tibial arteries\"  LEADS - Right: \"Evidence of 50-75% stenosis at the distal superficial femoral and proximal popliteal artery. Diffuse tibioperoneal vessel disease observed. Ankle/Brachial index: 0.79 , which is in the moderate category. Prior: 0.86. Metatarsal pressure of 129  mmHg. Great toe pressure of 100 mmHg, within the healing range. PVR/ PPG tracings are  dampened.\"  Per discussion with vascular surgery by ED no intervention planned and begun on heparin gtt  Await vascular consult  Ct ASA, statin  Pain control  Home Xarelto held as on heparin gtt  "

## 2024-08-11 NOTE — ED PROVIDER NOTES
"History  Chief Complaint   Patient presents with    Foot Pain     Patient to ED via EMS from home with c/o right foot pain for last 4 days. Patient has hx of DM and reports pain is burning/stabbing and it was the same as her left foot prior to her amputation.      Patient is a 38-year-old female, with a history significant for peripheral artery disease s/p left lower extremity amputation, diabetes, and schizoaffective disorder per my review the medical record, who presents to the ED today for evaluation of a 4-day history of gradual onset, atraumatic, constant, progressively worsening, burning/numbing in character right lower extremity pain.  Patient states this feels like when she required her amputation in her left lower extremity.  Movement exacerbates her symptoms.  Patient attempted ice as well as topical medications to remit her symptoms.  Patient also reports polyuria as well as episode of nonexertional/nonpleuritic/not positional chest pain that occurred days ago characterized as a pressure-like sensation.  There is no associated fever, dyspnea, acute vision change, dysphagia, abdominal pain, dysuria, weakness.  Patient is without other concerns at this time.        Prior to Admission Medications   Prescriptions Last Dose Informant Patient Reported? Taking?   Advair -21 MCG/ACT inhaler  Self No No   Sig: Inhale 2 puffs 2 (two) times a day Rinse mouth after use   Alcohol Swabs (Pharmacist Choice Alcohol) PADS  Outside Facility (Specify), Self No No   Sig: USE 3-4 TIMES AS DIRECTED.   B-D INS SYR MICROFINE .5CC/28G 28G X 1/2\" 0.5 ML MISC   No No   Sig: USE AS DIRECTED   Blood Glucose Monitoring Suppl (ONE TOUCH ULTRA 2) w/Device KIT  Self No No   Sig: BY DEVICE ROUTE 2 (TWO) TIMES A DAY CHECK BLOOD SUGARS AND RECORD VALUE AND TIME OF DAY TWICE A DAY   Blood Pressure Monitoring (Blood Pressure Monitor Automat) KATLYN  Self No No   Sig: Use Daily as Directed   Patient not taking: Reported on 2/7/2024 " "  Comfort EZ Pen Needles 32G X 4 MM MISC   No No   Sig: INJECT UNDER THE SKIN 4 (FOUR) TIMES A DAY (BEFORE MEALS AND AT BEDTIME)   Comfort EZ Pen Needles 33G X 4 MM MISC  Outside Facility (Specify), Self No No   Sig: USE AS DIRECTED   DULoxetine (CYMBALTA) 30 mg delayed release capsule   No No   Sig: TAKE 2 CAPSULES (60 MG TOTAL) BY MOUTH DAILY   Global Inject Ease Lancets 30G MISC  Self No No   Sig: USE 3 (THREE) TIMES A DAY   Lantus 100 UNIT/ML subcutaneous injection   No No   Sig: INJECT 30 UNITS UNDER THE SKIN EVERY 12 (TWELVE) HOURS   OneTouch Ultra test strip  Self No No   Sig: Use 1 each daily as needed (before meals) Use as instructed   Sure Comfort Insulin Syringe 31G X 5/16\" 0.3 ML MISC  Self Yes No   Si (two) times a day Use as directed   Trulicity 1.5 MG/0.5ML injection   No No   Sig: AS DIRECTED WEEKLY   Xarelto 20 MG tablet   No No   Sig: TAKE 1 TABLET (20 MG TOTAL) BY MOUTH DAILY WITH BREAKFAST   albuterol (PROVENTIL HFA,VENTOLIN HFA) 90 mcg/act inhaler   No No   Sig: INHALE 2 PUFFS EVERY 4 HOURS AS NEEDED FOR WHEEZING OR SHORTNESS OF BREATH   aspirin 81 mg chewable tablet   No No   Sig: CHEW 1 TABLET (81 MG TOTAL) DAILY   atorvastatin (LIPITOR) 40 mg tablet   No No   Sig: TAKE 1 TABLET (40 MG TOTAL) BY MOUTH DAILY WITH DINNER   dulaglutide (Trulicity) 0.75 MG/0.5ML injection   No No   Sig: Inject 0.5 mL (0.75 mg total) under the skin every 7 days   ferrous sulfate 324 (65 Fe) mg  Self No No   Sig: Take 1 tablet (324 mg total) by mouth every other day   Patient not taking: Reported on 2024   glycerin-hypromellose- (ARTIFICIAL TEARS) 0.2-0.2-1 % SOLN  Self No No   Sig: Administer 2 drops to both eyes every 6 (six) hours as needed (for eye discomfort)   hydrOXYzine HCL (ATARAX) 50 mg tablet  Self No No   Sig: TAKE 1 TABLET (50 MG TOTAL) BY MOUTH DAILY AT BEDTIME AS NEEDED FOR ITCHING OR ANXIETY   insulin lispro (HumALOG/ADMELOG) 100 units/mL injection   No No   Sig: INJECT 4-16 UNITS " UNDER THE SKIN 3 (THREE) TIMES A DAY BEFORE MEALS   ketotifen (ZADITOR) 0.025 % ophthalmic solution  Self No No   Sig: Administer 1 drop to both eyes 2 (two) times a day as needed (for eye discomfort)   lisinopril (ZESTRIL) 20 mg tablet   No No   Sig: TAKE 1 TABLET (20 MG TOTAL) BY MOUTH DAILY   metFORMIN (GLUCOPHAGE) 1000 MG tablet   No No   Sig: TAKE ONE (1) TABLET BY MOUTH TWICE DAILY WITH FOOD   nicotine (NICODERM CQ) 21 mg/24 hr TD 24 hr patch  Self No No   Sig: Place 1 patch on the skin over 24 hours daily   Patient not taking: Reported on 2024   prazosin (MINIPRESS) 1 mg capsule   No No   Sig: TAKE ONE (1) CAPSULE ONCE A DAY AT BEDTIME FOR HYPERTENSION   topiramate (TOPAMAX) 25 mg tablet  Self No No   Sig: TAKE 1 TABLET (25 MG TOTAL) BY MOUTH 2 (TWO) TIMES A DAY      Facility-Administered Medications: None       Past Medical History:   Diagnosis Date    Asthma     Atherosclerosis     Bipolar 1 disorder (HCC)     COPD (chronic obstructive pulmonary disease) (HCC)     Depression     Diabetes mellitus (HCC)     Hypertension     Psychiatric disorder     cutting history    PTSD (post-traumatic stress disorder)     Schizoaffective disorder (HCC)     Tendonitis        Past Surgical History:   Procedure Laterality Date    AMPUTATION ABOVE KNEE (AKA) Left 3/29/2023    Procedure: AMPUTATION ABOVE KNEE (AKA);  Surgeon: Savi Langford MD;  Location: [facility];  Service: Vascular    BYPASS FEMORAL-POPLITEAL Left 2021    Procedure: Left Common Femoral Below Knee to Popliteal Bypass with Insitu GSV graft.  Left Lower Extremity Angiogram;  Surgeon: Savi Langford MD;  Location: [facility];  Service: Vascular     SECTION      EAR SURGERY      FL LUMBAR PUNCTURE DIAGNOSTIC  10/25/2018    IR LOWER EXTREMITY ANGIOGRAM  2021    IR LOWER EXTREMITY ANGIOGRAM  2021    MS SLCTV CATHJ 3RD+ ORD SLCTV ABDL PEL/LXTR BRNCH Left 3/24/2023    Procedure: Left leg angiogram;  Surgeon: Byron Wahl DO;  Location: [facility];  Service: Vascular    TUBAL LIGATION         Family History   Problem Relation Age of Onset    Hypertension Mother     Diabetes Mother     HIV Mother     Heart disease Mother     No Known Problems Father      I have reviewed and agree with the history as documented.    E-Cigarette/Vaping    E-Cigarette Use Never User      E-Cigarette/Vaping Substances    Nicotine No     THC No     CBD No     Flavoring No     Other No     Unknown No      Social History     Tobacco Use    Smoking status: Every Day     Average packs/day: 1 pack/day for 20.0 years (20.0 ttl pk-yrs)     Types: Cigarettes     Start date: 3/24/2003     Last attempt to quit: 3/24/2023     Years since quittin.3     Passive exposure: Current    Smokeless tobacco: Former     Quit date: 2022   Vaping Use    Vaping status: Never Used   Substance Use Topics    Alcohol use: Not Currently     Comment: not currently    Drug use: Not Currently     Types: Cocaine, Marijuana, \"Crack\" cocaine     Comment: 1 years clean from crack cocaine since        Review of Systems   Constitutional:  Negative for fever.   HENT:  Negative for trouble swallowing.    Eyes:  Negative for visual disturbance.   Respiratory:  Negative for shortness of breath.    Cardiovascular:  Positive for chest pain.   Gastrointestinal:  Negative for abdominal pain.   Endocrine: Positive for polyuria.   Genitourinary:  Negative for dysuria.   Skin:  Negative for rash.   Allergic/Immunologic: Negative for environmental allergies.   Neurological:  Positive for numbness. Negative for weakness.   Hematological:  Negative for adenopathy.   All other systems reviewed and are negative.      Physical Exam  Physical Exam  Vitals and nursing note reviewed.   Constitutional:       General: She is not in acute distress.     Appearance: She is not toxic-appearing.   HENT:      Head: Normocephalic and atraumatic.      Right Ear: External ear normal.      Left Ear: External ear normal.      Nose: " Nose normal. No rhinorrhea.      Mouth/Throat:      Mouth: Mucous membranes are moist.      Pharynx: Oropharynx is clear. No oropharyngeal exudate or posterior oropharyngeal erythema.      Comments: Uvula midline. No oropharyngeal or submandibular mass/swelling  Eyes:      General: No scleral icterus.        Right eye: No discharge.         Left eye: No discharge.      Conjunctiva/sclera: Conjunctivae normal.      Pupils: Pupils are equal, round, and reactive to light.   Neck:      Comments: Patient is spontaneously rotating their neck to the left and right during the history and physical exam interaction without difficulty or apparent discomfort    Cardiovascular:      Rate and Rhythm: Normal rate and regular rhythm.      Pulses: Normal pulses.      Heart sounds: Normal heart sounds. No murmur heard.     No friction rub. No gallop.      Comments: 2+ Radial    Right DP palpable     NIKKI .86  Pulmonary:      Effort: Pulmonary effort is normal. No respiratory distress.      Breath sounds: Normal breath sounds. No stridor. No wheezing, rhonchi or rales.   Abdominal:      General: Abdomen is flat. There is no distension.      Palpations: Abdomen is soft.      Tenderness: There is no abdominal tenderness. There is no right CVA tenderness, left CVA tenderness, guarding or rebound.   Musculoskeletal:         General: Tenderness (Distal RLE) present.      Cervical back: Neck supple. No tenderness. No muscular tenderness.   Lymphadenopathy:      Cervical: No cervical adenopathy.   Skin:     General: Skin is warm and dry.      Capillary Refill: Capillary refill takes less than 2 seconds.   Neurological:      Mental Status: She is alert.      Comments: Patient is speaking clearly in complete sentences.  Patient is answering appropriately and able follow commands.  Patient is moving all four extremities spontaneously.  No facial droop.  Tongue midline.      Psychiatric:         Mood and Affect: Mood normal.         Behavior:  Behavior normal.         Vital Signs  ED Triage Vitals [08/11/24 0740]   Temperature Pulse Respirations Blood Pressure SpO2   97.8 °F (36.6 °C) 83 18 165/87 99 %      Temp Source Heart Rate Source Patient Position - Orthostatic VS BP Location FiO2 (%)   Oral Monitor Lying Right arm --      Pain Score       10 - Worst Possible Pain           Vitals:    08/11/24 1013 08/11/24 1023 08/11/24 1519 08/11/24 1615   BP: 160/81 166/82 165/80 129/74   Pulse: 77 70 84 72   Patient Position - Orthostatic VS:    Lying         Visual Acuity      ED Medications  Medications   lidocaine (LIDODERM) 5 % patch 1 patch (1 patch Topical Medication Applied 8/11/24 0818)   heparin (porcine) 25,000 units in 0.45% NaCl 250 mL infusion (premix) (18 Units/kg/hr × 110 kg (Order-Specific) Intravenous New Bag 8/11/24 1136)   HYDROmorphone (DILAUDID) injection 0.5 mg (0.5 mg Intravenous Given 8/11/24 1233)   oxyCODONE (ROXICODONE) IR tablet 5 mg (5 mg Oral Given 8/11/24 1233)   HYDROmorphone HCl (DILAUDID) injection 0.2 mg (0.2 mg Intravenous Given 8/11/24 1519)   Fluticasone Furoate-Vilanterol 100-25 mcg/actuation 1 puff (1 puff Inhalation Not Given 8/11/24 1547)   albuterol (PROVENTIL HFA,VENTOLIN HFA) inhaler 2 puff (has no administration in time range)   aspirin chewable tablet 81 mg (81 mg Oral Given 8/11/24 1339)   atorvastatin (LIPITOR) tablet 40 mg (40 mg Oral Given 8/11/24 1555)   DULoxetine (CYMBALTA) delayed release capsule 60 mg (60 mg Oral Given 8/11/24 1601)   hydrOXYzine HCL (ATARAX) tablet 50 mg (has no administration in time range)   lisinopril (ZESTRIL) tablet 20 mg (20 mg Oral Given 8/11/24 1339)   prazosin (MINIPRESS) capsule 1 mg (has no administration in time range)   topiramate (TOPAMAX) tablet 25 mg (25 mg Oral Not Given 8/11/24 1708)   nicotine (NICODERM CQ) 14 mg/24hr TD 24 hr patch 1 patch (1 patch Transdermal Medication Applied 8/11/24 1338)   ondansetron (ZOFRAN) injection 4 mg (has no administration in time range)    acetaminophen (TYLENOL) tablet 975 mg (975 mg Oral Given 8/11/24 1339)   insulin lispro (HumALOG/ADMELOG) 100 units/mL subcutaneous injection 1-6 Units (5 Units Subcutaneous Given 8/11/24 1709)   insulin lispro (HumALOG/ADMELOG) 100 units/mL subcutaneous injection 1-5 Units (has no administration in time range)   insulin glargine (LANTUS) subcutaneous injection 30 Units 0.3 mL (30 Units Subcutaneous Given 8/11/24 1342)   insulin glargine (LANTUS) subcutaneous injection 30 Units 0.3 mL (has no administration in time range)   gabapentin (NEURONTIN) capsule 300 mg (300 mg Oral Given 8/11/24 0818)   acetaminophen (TYLENOL) tablet 650 mg (650 mg Oral Given 8/11/24 0818)   fentaNYL injection 50 mcg (50 mcg Intravenous Given 8/11/24 0926)   fentaNYL injection 50 mcg (50 mcg Intravenous Given 8/11/24 1040)   iohexol (OMNIPAQUE) 350 MG/ML injection (MULTI-DOSE) 100 mL (100 mL Intravenous Given 8/11/24 1059)   fentaNYL injection 50 mcg (50 mcg Intravenous Given 8/11/24 1130)   potassium chloride (Klor-Con M20) CR tablet 20 mEq (20 mEq Oral Given 8/11/24 1339)       Diagnostic Studies  Results Reviewed       Procedure Component Value Units Date/Time    UA w Reflex to Microscopic w Reflex to Culture [[id number]]  (Abnormal) Collected: 08/11/24 1722    Lab Status: Final result Specimen: Urine, Clean Catch Updated: 08/11/24 1752     Color, UA Light Yellow     Clarity, UA Clear     Specific Gravity, UA >=1.050     pH, UA 6.0     Leukocytes, UA Elevated glucose may cause decreased leukocyte values. See urine microscopic for UWBC result     Nitrite, UA Negative     Protein, UA Negative mg/dl      Glucose, UA >=1000 (1%) mg/dl      Ketones, UA Negative mg/dl      Urobilinogen, UA <2.0 mg/dl      Bilirubin, UA Negative     Occult Blood, UA Negative    APTT [[id number]] Collected: 08/11/24 1716    Lab Status: In process Specimen: Blood from Arm, Right Updated: 08/11/24 1720    Magnesium [[id number]]  (Abnormal) Collected: 08/11/24 0843     Lab Status: Final result Specimen: Blood from Arm, Right Updated: 08/11/24 1331     Magnesium 1.8 mg/dL     Fingerstick Glucose (POCT) [[id number]]  (Abnormal) Collected: 08/11/24 1321    Lab Status: Final result Specimen: Blood Updated: 08/11/24 1321     POC Glucose 210 mg/dl     HS Troponin I 4hr [[id number]]  (Normal) Collected: 08/11/24 1241    Lab Status: Final result Specimen: Blood from Arm, Right Updated: 08/11/24 1311     hs TnI 4hr 4 ng/L      Delta 4hr hsTnI 0 ng/L     APTT [[id number]]  (Normal) Collected: 08/11/24 1108    Lab Status: Final result Specimen: Blood from Arm, Right Updated: 08/11/24 1129     PTT 27 seconds     HS Troponin I 2hr [[id number]]  (Normal) Collected: 08/11/24 1042    Lab Status: Final result Specimen: Blood from Arm, Right Updated: 08/11/24 1117     hs TnI 2hr 3 ng/L      Delta 2hr hsTnI -1 ng/L     hCG, qualitative pregnancy [[id number]]  (Normal) Collected: 08/11/24 0843    Lab Status: Final result Specimen: Blood from Arm, Right Updated: 08/11/24 0915     Preg, Serum Negative    HS Troponin 0hr (reflex protocol) [[id number]]  (Normal) Collected: 08/11/24 0843    Lab Status: Final result Specimen: Blood from Arm, Right Updated: 08/11/24 0914     hs TnI 0hr 4 ng/L     Comprehensive metabolic panel [[id number]]  (Abnormal) Collected: 08/11/24 0843    Lab Status: Final result Specimen: Blood from Arm, Right Updated: 08/11/24 0908     Sodium 135 mmol/L      Potassium 3.4 mmol/L      Chloride 104 mmol/L      CO2 24 mmol/L      ANION GAP 7 mmol/L      BUN 11 mg/dL      Creatinine 0.43 mg/dL      Glucose 210 mg/dL      Calcium 8.7 mg/dL      AST 8 U/L      ALT 15 U/L      Alkaline Phosphatase 102 U/L      Total Protein 6.7 g/dL      Albumin 3.5 g/dL      Total Bilirubin 0.33 mg/dL      eGFR 130 ml/min/1.73sq m     Narrative:      [employer] guidelines for Chronic Kidney Disease (CKD):     Stage 1 with normal or high GFR (GFR > 90 mL/min/1.73 square meters)    Stage  2 Mild CKD (GFR = 60-89 mL/min/1.73 square meters)    Stage 3A Moderate CKD (GFR = 45-59 mL/min/1.73 square meters)    Stage 3B Moderate CKD (GFR = 30-44 mL/min/1.73 square meters)    Stage 4 Severe CKD (GFR = 15-29 mL/min/1.73 square meters)    Stage 5 End Stage CKD (GFR <15 mL/min/1.73 square meters)  Note: GFR calculation is accurate only with a steady state creatinine    Protime-INR [[id number]]  (Normal) Collected: 08/11/24 0843    Lab Status: Final result Specimen: Blood from Arm, Right Updated: 08/11/24 0904     Protime 13.3 seconds      INR 0.94    Narrative:      INR Therapeutic Range    Indication                                             INR Range      Atrial Fibrillation                                               2.0-3.0  Hypercoagulable State                                    2.0.2.3  Left Ventricular Asist Device                            2.0-3.0  Mechanical Heart Valve                                  -    Aortic(with afib, MI, embolism, HF, LA enlargement,    and/or coagulopathy)                                     2.0-3.0 (2.5-3.5)     Mitral                                                             2.5-3.5  Prosthetic/Bioprosthetic Heart Valve               2.0-3.0  Venous thromboembolism (VTE: VT, PE        2.0-3.0    APTT [[id number]]  (Normal) Collected: 08/11/24 0843    Lab Status: Final result Specimen: Blood from Arm, Right Updated: 08/11/24 0904     PTT 27 seconds     CBC and differential [[id number]]  (Abnormal) Collected: 08/11/24 0843    Lab Status: Final result Specimen: Blood from Arm, Right Updated: 08/11/24 0850     WBC 9.63 Thousand/uL      RBC 5.11 Million/uL      Hemoglobin 13.1 g/dL      Hematocrit 37.0 %      MCV 72 fL      MCH 25.6 pg      MCHC 35.4 g/dL      RDW 15.3 %      MPV 10.1 fL      Platelets 398 Thousands/uL      nRBC 0 /100 WBCs      Segmented % 57 %      Immature Grans % 0 %      Lymphocytes % 35 %      Monocytes % 6 %      Eosinophils Relative 2 %       Basophils Relative 0 %      Absolute Neutrophils 5.44 Thousands/µL      Absolute Immature Grans 0.03 Thousand/uL      Absolute Lymphocytes 3.41 Thousands/µL      Absolute Monocytes 0.53 Thousand/µL      Eosinophils Absolute 0.18 Thousand/µL      Basophils Absolute 0.04 Thousands/µL                    CTA abdominal w run off w wo contrast   Final Result by Jordy Henley MD (08/11 1216)      No hemodynamically significant inflow arterial stenosis in the abdomen or pelvis.      Mild atherosclerotic irregularity of the left external iliac artery with estimated stenosis of less than 30%.      Focal greater than 70% stenosis of the distal right superficial femoral artery at the level of the abductor canal for a distance of 1.5 cm.      Additional 50 to 60% stenosis of the above-the-knee right popliteal artery 5 cm superior to the knee joint for a distance of 0.6 cm      Three-vessel runoff in the right calf with mild atherosclerotic irregularity of the proximal peroneal and posterior tibial arteries.      Occlusion of the left superficial femoral artery throughout its course in the left thigh. Left superficial femoral artery contains an occluded long segment stent. No distal arterial reconstitution. Left leg above-the-knee amputation      Extensive tree-in-bud nodularity of the lung bases that is nonspecific may represent a follicular bronchiolitis      The study was marked in EPIC for immediate notification..      Workstation performed: [id number]         VAS ARTERIAL DUPLEX-LOWER LIMB UNILATERAL   Final Result by Savi Langford MD (08/11 1898)      XR chest 1 view portable   ED Interpretation by Nabeel Quiñonez MD (08/11 0902)   Per my independent interpretation:  no acute cardiopulmonary process       Final Result by Glory Buckner MD (08/11 0840)      No acute cardiopulmonary disease.            Workstation performed: [id number]                    Procedures  CriticalCare Time    Date/Time: 8/11/2024 5:58  PM    Performed by: Nabeel Quiñonez MD  Authorized by: Nabeel Quiñonez MD    Critical care provider statement:     Critical care time (minutes):  45    Critical care start time:  8/11/2024 7:47 AM    Critical care end time:  8/11/2024 8:32 AM    Critical care time was exclusive of:  Separately billable procedures and treating other patients    Critical care was necessary to treat or prevent imminent or life-threatening deterioration of the following conditions:  Circulatory failure    Critical care was time spent personally by me on the following activities:  Discussions with consultants, evaluation of patient's response to treatment, examination of patient, ordering and review of laboratory studies, ordering and review of radiographic studies, re-evaluation of patient's condition, review of old charts, ordering and performing treatments and interventions, development of treatment plan with patient or surrogate and obtaining history from patient or surrogate    I assumed direction of critical care for this patient from another provider in my specialty: no             ED Course  ED Course as of 08/11/24 1759   Sun Aug 11, 2024   0843 I have reached out to multiple roles and checked MAYRA in an attempt to contact vascular surgery   0844 ECG per my independent interpretation: Normal rate, regular rhythm, no ectopy, normal axis, no ST elevations or depressions. T inversion III   0921 hs TnI 0hr: 4  WNL   0921 PREGNANCY, SERUM: Negative  WNL   0922 Patient continues to report significant pain   0945 I discussed case with Savi Langford from vascular surgery    1042 Patient continues to have severe pain.  Per discussion with Dr. Langford from vascular surgery, CTA abdomen with runoff recommended   1051 Heparin also started at vascular surgery's recommendation.  Arterial duplex results now available and reviewed with vascular surgery                                               Medical Decision Making  Patient is a  38-year-old female, with a history significant for peripheral artery disease s/p left lower extremity amputation, diabetes, and schizoaffective disorder per my review the medical record, who presents to the ED today for evaluation of a 4-day history of gradual onset, atraumatic, constant, progressively worsening, burning/numbing in character right lower extremity pain.  Patient states this feels like when she required her amputation in her left lower extremity.  Movement exacerbates her symptoms.  Patient attempted ice as well as topical medications to remit her symptoms.  Patient also reports polyuria as well as episode of nonexertional/nonpleuritic/not positional chest pain that occurred days ago characterized as a pressure-like sensation.  There is no associated fever, dyspnea, acute vision change, dysphagia, abdominal pain, dysuria, weakness.  Patient is currently afebrile and hemodynamically stable.  On exam, patient has no pain with calf squeeze, pulses intact in the right lower extremity but NIKKI 0.86.  Heart lungs clear to auscultation, abdomen soft nontender.  This presentation is concerning for: Acute limb ischemia.  I also considered electrolyte abnormality, SARAH, ACS.  Will investigate with coagulation studies, cardiac workup, pregnancy test, arterial duplex.  Will manage with multimodal analgesia and further based upon workup.  Will discuss with vascular surgery.    Amount and/or Complexity of Data Reviewed  Labs: ordered. Decision-making details documented in ED Course.  Radiology: ordered.    Risk  OTC drugs.  Prescription drug management.  Decision regarding hospitalization.                 Disposition  Final diagnoses:   Acute leg pain, right   Peripheral vascular disease (HCC)   Elevated blood pressure reading     Time reflects when diagnosis was documented in both MDM as applicable and the Disposition within this note       Time User Action Codes Description Comment    8/11/2024 11:13 AM Olamide  Nabeel TAMMY Add [M79.604] Acute leg pain, right     8/11/2024 11:51 AM Legphani, Nabeel MUNOZ Add [I73.9] Peripheral vascular disease (HCC)     8/11/2024 11:51 AM Legphani, Nabeel MUNOZ Modify [M79.604] Acute leg pain, right     8/11/2024 11:51 AM Legphani, Nabeel A Modify [I73.9] Peripheral vascular disease (HCC)     8/11/2024 11:51 AM Legphani, Nabeel MUNOZ Add [R03.0] Elevated blood pressure reading           ED Disposition       ED Disposition   Admit    Condition   Stable    Date/Time   Sun Aug 11, 2024 12:11 PM    Comment   Case was discussed with im and the patient's admission status was agreed to be  to the service of Dr. Medina .               Follow-up Information    None         Current Discharge Medication List        CONTINUE these medications which have NOT CHANGED    Details   Advair -21 MCG/ACT inhaler Inhale 2 puffs 2 (two) times a day Rinse mouth after use  Qty: 12 g, Refills: 1    Comments: Substitution to a formulary equivalent within the same pharmaceutical class is authorized.  Associated Diagnoses: Chronic obstructive pulmonary disease with acute exacerbation ([facility])      albuterol (PROVENTIL HFA,VENTOLIN HFA) 90 mcg/act inhaler INHALE 2 PUFFS EVERY 4 HOURS AS NEEDED FOR WHEEZING OR SHORTNESS OF BREATH  Qty: 8.5 g, Refills: 5    Comments: Substitution to a formulary equivalent within the same pharmaceutical class is authorized.  Associated Diagnoses: Chronic obstructive pulmonary disease, unspecified COPD type ([facility])      Alcohol Swabs (Pharmacist Choice Alcohol) PADS USE 3-4 TIMES AS DIRECTED.  Qty: 100 each, Refills: 3    Associated Diagnoses: Uncontrolled type 2 diabetes mellitus with hyperglycemia ([facility])      aspirin 81 mg chewable tablet CHEW 1 TABLET (81 MG TOTAL) DAILY  Qty: 30 tablet, Refills: 2    Associated Diagnoses: PAD (peripheral artery disease) ([facility])      atorvastatin (LIPITOR) 40 mg tablet TAKE 1 TABLET (40 MG TOTAL) BY MOUTH DAILY WITH DINNER  Qty: 30 tablet, Refills: 5  "   Associated Diagnoses: PAD (peripheral artery disease) ([facility])      B-D INS SYR MICROFINE .5CC/28G 28G X 1/2\" 0.5 ML MISC USE AS DIRECTED  Qty: 100 each, Refills: 5    Associated Diagnoses: Type 2 diabetes mellitus with diabetic polyneuropathy, with long-term current use of insulin ([facility])      Blood Glucose Monitoring Suppl (ONE TOUCH ULTRA 2) w/Device KIT BY DEVICE ROUTE 2 (TWO) TIMES A DAY CHECK BLOOD SUGARS AND RECORD VALUE AND TIME OF DAY TWICE A DAY  Qty: 1 kit, Refills: 2    Associated Diagnoses: Uncontrolled type 2 diabetes mellitus with hyperglycemia ([facility])      Blood Pressure Monitoring (Blood Pressure Monitor Automat) KATLYN Use Daily as Directed  Qty: 1 each, Refills: 0    Associated Diagnoses: Hypertension, unspecified type      !! Comfort EZ Pen Needles 32G X 4 MM MISC INJECT UNDER THE SKIN 4 (FOUR) TIMES A DAY (BEFORE MEALS AND AT BEDTIME)  Qty: 120 each, Refills: 5    Associated Diagnoses: Type 2 diabetes mellitus with diabetic polyneuropathy, with long-term current use of insulin ([facility])      !! Comfort EZ Pen Needles 33G X 4 MM MISC USE AS DIRECTED  Qty: 300 each, Refills: 0    Associated Diagnoses: Type 2 diabetes mellitus with diabetic polyneuropathy, with long-term current use of insulin ([facility])      dulaglutide (Trulicity) 0.75 MG/0.5ML injection Inject 0.5 mL (0.75 mg total) under the skin every 7 days  Qty: 6 mL, Refills: 1    Associated Diagnoses: Type 2 diabetes mellitus with diabetic polyneuropathy, with long-term current use of insulin ([facility])      DULoxetine (CYMBALTA) 30 mg delayed release capsule TAKE 2 CAPSULES (60 MG TOTAL) BY MOUTH DAILY  Qty: 60 capsule, Refills: 0    Associated Diagnoses: Bipolar disorder, in partial remission, most recent episode depressed ([facility])      ferrous sulfate 324 (65 Fe) mg Take 1 tablet (324 mg total) by mouth every other day  Qty: 30 tablet, Refills: 0    Associated Diagnoses: Iron deficiency anemia, unspecified iron deficiency anemia type      Global Inject Ease " Lancets 30G MISC USE 3 (THREE) TIMES A DAY  Qty: 100 each, Refills: 0    Associated Diagnoses: Type 2 diabetes mellitus with diabetic polyneuropathy, with long-term current use of insulin ([facility])      glycerin-hypromellose- (ARTIFICIAL TEARS) 0.2-0.2-1 % SOLN Administer 2 drops to both eyes every 6 (six) hours as needed (for eye discomfort)  Qty: 30 mL, Refills: 1    Associated Diagnoses: Discomfort of both eyes      hydrOXYzine HCL (ATARAX) 50 mg tablet TAKE 1 TABLET (50 MG TOTAL) BY MOUTH DAILY AT BEDTIME AS NEEDED FOR ITCHING OR ANXIETY  Qty: 30 tablet, Refills: 0    Associated Diagnoses: Bipolar disorder, in partial remission, most recent episode depressed ([facility])      insulin lispro (HumALOG/ADMELOG) 100 units/mL injection INJECT 4-16 UNITS UNDER THE SKIN 3 (THREE) TIMES A DAY BEFORE MEALS  Qty: 10 mL, Refills: 6    Associated Diagnoses: Type 2 diabetes mellitus with diabetic polyneuropathy, with long-term current use of insulin ([facility])      ketotifen (ZADITOR) 0.025 % ophthalmic solution Administer 1 drop to both eyes 2 (two) times a day as needed (for eye discomfort)  Qty: 5 mL, Refills: 0    Associated Diagnoses: Discomfort of both eyes      Lantus 100 UNIT/ML subcutaneous injection INJECT 30 UNITS UNDER THE SKIN EVERY 12 (TWELVE) HOURS  Qty: 10 mL, Refills: 6    Associated Diagnoses: Type 2 diabetes mellitus with diabetic polyneuropathy, with long-term current use of insulin ([facility])      lisinopril (ZESTRIL) 20 mg tablet TAKE 1 TABLET (20 MG TOTAL) BY MOUTH DAILY  Qty: 100 tablet, Refills: 1    Associated Diagnoses: Hypertension      metFORMIN (GLUCOPHAGE) 1000 MG tablet TAKE ONE (1) TABLET BY MOUTH TWICE DAILY WITH FOOD  Qty: 60 tablet, Refills: 0    Associated Diagnoses: Diabetes ([facility])      nicotine (NICODERM CQ) 21 mg/24 hr TD 24 hr patch Place 1 patch on the skin over 24 hours daily  Qty: 28 patch, Refills: 3    Associated Diagnoses: Tobacco consumption; Nicotine dependence, uncomplicated, unspecified  "nicotine product type      OneTouch Ultra test strip Use 1 each daily as needed (before meals) Use as instructed  Qty: 200 each, Refills: 2    Associated Diagnoses: Type 2 diabetes mellitus with diabetic polyneuropathy, with long-term current use of insulin ([facility])      prazosin (MINIPRESS) 1 mg capsule TAKE ONE (1) CAPSULE ONCE A DAY AT BEDTIME FOR HYPERTENSION  Qty: 90 capsule, Refills: 1    Associated Diagnoses: Hypertension      Sure Comfort Insulin Syringe 31G X 5/16\" 0.3 ML MISC 2 (two) times a day Use as directed      topiramate (TOPAMAX) 25 mg tablet TAKE 1 TABLET (25 MG TOTAL) BY MOUTH 2 (TWO) TIMES A DAY  Qty: 60 tablet, Refills: 1    Associated Diagnoses: Bipolar disorder, in partial remission, most recent episode depressed ([facility])      Trulicity 1.5 MG/0.5ML injection AS DIRECTED WEEKLY  Qty: 2 mL, Refills: 2    Associated Diagnoses: Type 2 diabetes mellitus with diabetic polyneuropathy, with long-term current use of insulin ([facility])      Xarelto 20 MG tablet TAKE 1 TABLET (20 MG TOTAL) BY MOUTH DAILY WITH BREAKFAST  Qty: 30 tablet, Refills: 3    Associated Diagnoses: PAD (peripheral artery disease) ([facility])       !! - Potential duplicate medications found. Please discuss with provider.          No discharge procedures on file.    PDMP Review         Value Time User    PDMP Reviewed  Yes 6/18/2024  5:12 PM Mariam Baptiste DO            ED Provider  Electronically Signed by             Nabeel Quiñonez MD  08/11/24 3704    "

## 2024-08-11 NOTE — H&P
"[facility]  H&P  Name: Tony Roberto 38 y.o. female I MRN: [id number]  Unit/Bed#: ED-28 I Date of Admission: 8/11/2024   Date of Service: 8/11/2024 I Hospital Day: 0      Assessment & Plan   Peripheral arterial disease (HCC)  Assessment & Plan  Presents with severe right foot pain since 4 days  History of PAD s/p left AKA on ASA 81 mg daily, atorvastatin 40 mg daily and Xarelto 20 mg daily  CTA abdominal with runoff - \"Mild atherosclerotic irregularity of the left external iliac artery with estimated stenosis of less than 30%. Focal greater than 70% stenosis of the distal right superficial femoral artery at the level of the abductor canal for a distance of 1.5 cm. Additional 50 to 60% stenosis of the above-the-knee right popliteal artery 5 cm superior to the knee joint for a distance of 0.6 cm. Three-vessel runoff in the right calf with mild atherosclerotic irregularity of the proximal peroneal and posterior tibial arteries\"  LEADS - Right: \"Evidence of 50-75% stenosis at the distal superficial femoral and proximal popliteal artery. Diffuse tibioperoneal vessel disease observed. Ankle/Brachial index: 0.79 , which is in the moderate category. Prior: 0.86. Metatarsal pressure of 129  mmHg. Great toe pressure of 100 mmHg, within the healing range. PVR/ PPG tracings are  dampened.\"  Per discussion with vascular surgery by ED no intervention planned and begun on heparin gtt  Await vascular consult  Ct ASA, statin  Pain control  Home Xarelto held as on heparin gtt    Hypertension  Assessment & Plan  Continue home meds Lisinopril 20 mg daily, Prazosin 1 mg at bedtime  Monitor BP    Type 2 diabetes mellitus with diabetic polyneuropathy, with long-term current use of insulin (HCC)  Assessment & Plan  Lab Results   Component Value Date    HGBA1C 9.9 (H) 08/25/2023       No results for input(s): \"POCGLU\" in the last 72 hours.    Blood Sugar Average: Last 72 hrs:  Continue home regimen Lantus 30 " units twice daily, Humalog sliding scale insulin  Metformin, Trulicity held      Bipolar disorder  Assessment & Plan  Continue home meds Duloxetine 60 mg daily, Hydroxyzine 50 mg at bedtime as needed, Topamax 25 mg twice daily    COPD (chronic obstructive pulmonary disease)/asthma  Assessment & Plan  No exacerbation   Home inhaler changed to equivalent Breo  Continue albuterol as needed    Tobacco abuse  Assessment & Plan  Nicotine patch  Smoking cessation advised    S/P AKA (above knee amputation) unilateral, left (HCC)  Assessment & Plan  Supportive care    Class 3 severe obesity due to excess calories without serious comorbidity with body mass index (BMI) of 45.0 to 49.9 in adult (HCC)  Assessment & Plan  Affects all aspects of care  Lifestyle modification         VTE Pharmacologic Prophylaxis: VTE Score: 5 High Risk (Score >/= 5) - Pharmacological DVT Prophylaxis Ordered: heparin drip. Sequential Compression Devices Ordered.  Code Status: Level 1 - Full Code   Discussion with family: Updated  (wife) via phone.    Anticipated Length of Stay: Patient will be admitted on an inpatient basis with an anticipated length of stay of greater than 2 midnights secondary to PAD.    Total Time Spent on Date of Encounter in care of patient: 75 mins. This time was spent on one or more of the following: performing physical exam; counseling and coordination of care; obtaining or reviewing history; documenting in the medical record; reviewing/ordering tests, medications or procedures; communicating with other healthcare professionals and discussing with patient's family/caregivers.    Chief Complaint: Right foot pain    History of Present Illness:  Tony Roberto is a 38 y.o. female with a PMH of type 2 diabetes, asthma, COPD, bipolar 1 disorder, hypertension, peripheral arterial disease who presents with severe pain over the sole of the right foot since 4 days which has been constant.  She has a history of  peripheral arterial disease requiring interventions initially on the left followed by left above-knee amputation.  She feels her pain is similar to the pain she had before her left above-knee amputation.  CTA abdominal with runoff showed focal greater than 70% stenosis of the distal right superficial femoral artery and additional 50 to 60% stenosis of the above-the-knee right popliteal artery.  Her presentation was discussed with the vascular team by ED who recommended placing her on heparin drip.     Review of Systems:  Review of Systems   Musculoskeletal:         Right foot pain   All other systems reviewed and are negative.      Past Medical and Surgical History:   Past Medical History:   Diagnosis Date    Asthma     Atherosclerosis     Bipolar 1 disorder (HCC)     COPD (chronic obstructive pulmonary disease) (HCC)     Depression     Diabetes mellitus (HCC)     Hypertension     Psychiatric disorder     cutting history    PTSD (post-traumatic stress disorder)     Schizoaffective disorder (HCC)     Tendonitis        Past Surgical History:   Procedure Laterality Date    AMPUTATION ABOVE KNEE (AKA) Left 3/29/2023    Procedure: AMPUTATION ABOVE KNEE (AKA);  Surgeon: Savi Langford MD;  Location: [facility];  Service: Vascular    BYPASS FEMORAL-POPLITEAL Left 2021    Procedure: Left Common Femoral Below Knee to Popliteal Bypass with Insitu GSV graft.  Left Lower Extremity Angiogram;  Surgeon: Savi Langford MD;  Location: [facility];  Service: Vascular     SECTION      EAR SURGERY      FL LUMBAR PUNCTURE DIAGNOSTIC  10/25/2018    IR LOWER EXTREMITY ANGIOGRAM  2021    IR LOWER EXTREMITY ANGIOGRAM  2021    CO SLCTV CATHJ 3RD+ ORD SLCTV ABDL PEL/LXTR BRNCH Left 3/24/2023    Procedure: Left leg angiogram;  Surgeon: Byron Wahl DO;  Location: [facility];  Service: Vascular    TUBAL LIGATION         Meds/Allergies:  Prior to Admission medications    Medication Sig Start Date End Date Taking?  "Authorizing Provider   Advair -21 MCG/ACT inhaler Inhale 2 puffs 2 (two) times a day Rinse mouth after use 8/9/23   Mati Prabhakar MD   albuterol (PROVENTIL HFA,VENTOLIN HFA) 90 mcg/act inhaler INHALE 2 PUFFS EVERY 4 HOURS AS NEEDED FOR WHEEZING OR SHORTNESS OF BREATH 7/24/24   Mati Prabhakar MD   Alcohol Swabs (Pharmacist Choice Alcohol) PADS USE 3-4 TIMES AS DIRECTED. 11/1/22   Stevan Chinchilla MD   aspirin 81 mg chewable tablet CHEW 1 TABLET (81 MG TOTAL) DAILY 2/22/24   Mati Prabhakar MD   atorvastatin (LIPITOR) 40 mg tablet TAKE 1 TABLET (40 MG TOTAL) BY MOUTH DAILY WITH DINNER 8/7/24   Mati Prabhakar MD   B-D INS SYR MICROFINE .5CC/28G 28G X 1/2\" 0.5 ML MISC USE AS DIRECTED 6/24/24   Mati Prabhakar MD   Blood Glucose Monitoring Suppl (ONE TOUCH ULTRA 2) w/Device KIT BY DEVICE ROUTE 2 (TWO) TIMES A DAY CHECK BLOOD SUGARS AND RECORD VALUE AND TIME OF DAY TWICE A DAY 10/12/23   Mati Prabhakar MD   Blood Pressure Monitoring (Blood Pressure Monitor Automat) KATLYN Use Daily as Directed  Patient not taking: Reported on 2/7/2024 11/14/23   Mati Prabhakar MD   Comfort EZ Pen Needles 32G X 4 MM MISC INJECT UNDER THE SKIN 4 (FOUR) TIMES A DAY (BEFORE MEALS AND AT BEDTIME) 4/26/24   Mati Prabhakar MD   Comfort EZ Pen Needles 33G X 4 MM MISC USE AS DIRECTED 5/10/23   Mati Prabhakar MD   dulaglutide (Trulicity) 0.75 MG/0.5ML injection Inject 0.5 mL (0.75 mg total) under the skin every 7 days 4/2/24   Toby Yousif MD   DULoxetine (CYMBALTA) 30 mg delayed release capsule TAKE 2 CAPSULES (60 MG TOTAL) BY MOUTH DAILY 8/2/24   aMti Prabhakar MD   ferrous sulfate 324 (65 Fe) mg Take 1 tablet (324 mg total) by mouth every other day  Patient not taking: Reported on 2/7/2024 11/14/23   Mati Prabhakar MD   Global Inject Ease Lancets 30G MISC USE 3 (THREE) TIMES A DAY 11/1/23   Mati Prabhakar MD   glycerin-hypromellose- (ARTIFICIAL TEARS) 0.2-0.2-1 % SOLN Administer 2 drops to both eyes every 6 " "(six) hours as needed (for eye discomfort) 8/28/23   Willow Saha DO   hydrOXYzine HCL (ATARAX) 50 mg tablet TAKE 1 TABLET (50 MG TOTAL) BY MOUTH DAILY AT BEDTIME AS NEEDED FOR ITCHING OR ANXIETY 10/18/23   Mati Prabhakar MD   insulin lispro (HumALOG/ADMELOG) 100 units/mL injection INJECT 4-16 UNITS UNDER THE SKIN 3 (THREE) TIMES A DAY BEFORE MEALS 5/30/24   Mati Prabhakar MD   ketotifen (ZADITOR) 0.025 % ophthalmic solution Administer 1 drop to both eyes 2 (two) times a day as needed (for eye discomfort) 8/28/23   Willow Saha DO   Lantus 100 UNIT/ML subcutaneous injection INJECT 30 UNITS UNDER THE SKIN EVERY 12 (TWELVE) HOURS 6/18/24   Mati Prabhakar MD   lisinopril (ZESTRIL) 20 mg tablet TAKE 1 TABLET (20 MG TOTAL) BY MOUTH DAILY 7/24/24   Mati Prabhakar MD   metFORMIN (GLUCOPHAGE) 1000 MG tablet TAKE ONE (1) TABLET BY MOUTH TWICE DAILY WITH FOOD 6/12/24   Mati Prabhakar MD   nicotine (NICODERM CQ) 21 mg/24 hr TD 24 hr patch Place 1 patch on the skin over 24 hours daily  Patient not taking: Reported on 2/7/2024 9/18/23   Mati Prabhakar MD   OneTouch Ultra test strip Use 1 each daily as needed (before meals) Use as instructed 9/18/23   Mati Prabhakar MD   prazosin (MINIPRESS) 1 mg capsule TAKE ONE (1) CAPSULE ONCE A DAY AT BEDTIME FOR HYPERTENSION 4/27/24   Mati Prabhakar MD   Sure Comfort Insulin Syringe 31G X 5/16\" 0.3 ML MISC 2 (two) times a day Use as directed 11/9/23   Historical Provider, MD   topiramate (TOPAMAX) 25 mg tablet TAKE 1 TABLET (25 MG TOTAL) BY MOUTH 2 (TWO) TIMES A DAY 10/6/23   Mati Prabhakar MD   Trulicity 1.5 MG/0.5ML injection AS DIRECTED WEEKLY 6/18/24   Mati Prabhakar MD   Xarelto 20 MG tablet TAKE 1 TABLET (20 MG TOTAL) BY MOUTH DAILY WITH BREAKFAST 5/30/24   Mati Prabhakar MD     I have reviewed home medications with patient personally.    Allergies: No Known Allergies    Social History:  Marital Status: /Civil Union   Substance Use History: " "  Social History     Substance and Sexual Activity   Alcohol Use Not Currently    Comment: not currently     Social History     Tobacco Use   Smoking Status Every Day    Average packs/day: 1 pack/day for 20.0 years (20.0 ttl pk-yrs)    Types: Cigarettes    Start date: 3/24/2003    Last attempt to quit: 3/24/2023    Years since quittin.3    Passive exposure: Current   Smokeless Tobacco Former    Quit date: 2022     Social History     Substance and Sexual Activity   Drug Use Not Currently    Types: Cocaine, Marijuana, \"Crack\" cocaine    Comment: 1 years clean from crack cocaine since        Family History:  Family History   Problem Relation Age of Onset    Hypertension Mother     Diabetes Mother     HIV Mother     Heart disease Mother     No Known Problems Father        Physical Exam:     Vitals:   Blood Pressure: 166/82 (24 1023)  Pulse: 70 (24 1023)  Temperature: 97.8 °F (36.6 °C) (24 0740)  Temp Source: Oral (24 0740)  Respirations: 18 (24 0800)  Weight - Scale: 114 kg (251 lb 5.2 oz) (24 1050)  SpO2: 99 % (24 1023)    Physical Exam  Vitals reviewed.   HENT:      Head: Normocephalic.      Nose: Nose normal.      Mouth/Throat:      Mouth: Mucous membranes are moist.   Eyes:      Extraocular Movements: Extraocular movements intact.   Cardiovascular:      Rate and Rhythm: Normal rate and regular rhythm.   Pulmonary:      Effort: Pulmonary effort is normal. No respiratory distress.      Breath sounds: Normal breath sounds. No wheezing.   Abdominal:      General: Bowel sounds are normal. There is no distension.      Palpations: Abdomen is soft.      Tenderness: There is no abdominal tenderness.   Musculoskeletal:         General: No swelling.      Cervical back: Neck supple.   Skin:     General: Skin is warm and dry.   Neurological:      General: No focal deficit present.      Mental Status: She is alert and oriented to person, place, and time.   Psychiatric:        "  Mood and Affect: Mood normal.         Behavior: Behavior normal.          Additional Data:     Lab Results:  Results from last 7 days   Lab Units 08/11/24  0843   WBC Thousand/uL 9.63   HEMOGLOBIN g/dL 13.1   HEMATOCRIT % 37.0   PLATELETS Thousands/uL 398*   SEGS PCT % 57   LYMPHO PCT % 35   MONO PCT % 6   EOS PCT % 2     Results from last 7 days   Lab Units 08/11/24  0843   SODIUM mmol/L 135   POTASSIUM mmol/L 3.4*   CHLORIDE mmol/L 104   CO2 mmol/L 24   BUN mg/dL 11   CREATININE mg/dL 0.43*   ANION GAP mmol/L 7   CALCIUM mg/dL 8.7   ALBUMIN g/dL 3.5   TOTAL BILIRUBIN mg/dL 0.33   ALK PHOS U/L 102   ALT U/L 15   AST U/L 8*   GLUCOSE RANDOM mg/dL 210*     Results from last 7 days   Lab Units 08/11/24  0843   INR  0.94         Lab Results   Component Value Date    HGBA1C 9.9 (H) 08/25/2023    HGBA1C 9.8 (A) 12/20/2022    HGBA1C 8.5 (H) 09/08/2021           Lines/Drains:  Invasive Devices       Peripheral Intravenous Line  Duration             Peripheral IV 08/11/24 Proximal;Right;Ventral (anterior) Forearm <1 day    Peripheral IV 08/11/24 Right Antecubital <1 day                        Imaging: Reviewed radiology reports from this admission including: CTA abdominal with runoff  CTA abdominal w run off w wo contrast   Final Result by Jordy Henley MD (08/11 1216)      No hemodynamically significant inflow arterial stenosis in the abdomen or pelvis.      Mild atherosclerotic irregularity of the left external iliac artery with estimated stenosis of less than 30%.      Focal greater than 70% stenosis of the distal right superficial femoral artery at the level of the abductor canal for a distance of 1.5 cm.      Additional 50 to 60% stenosis of the above-the-knee right popliteal artery 5 cm superior to the knee joint for a distance of 0.6 cm      Three-vessel runoff in the right calf with mild atherosclerotic irregularity of the proximal peroneal and posterior tibial arteries.      Occlusion of the left superficial  femoral artery throughout its course in the left thigh. Left superficial femoral artery contains an occluded long segment stent. No distal arterial reconstitution. Left leg above-the-knee amputation      Extensive tree-in-bud nodularity of the lung bases that is nonspecific may represent a follicular bronchiolitis      The study was marked in EPIC for immediate notification..      Workstation performed: [id number]         XR chest 1 view portable   ED Interpretation by Nabeel Quiñonez MD (08/11 0902)   Per my independent interpretation:  no acute cardiopulmonary process       Final Result by Glory Buckner MD (08/11 0840)      No acute cardiopulmonary disease.            Workstation performed: [id number]         VAS ARTERIAL DUPLEX-LOWER LIMB UNILATERAL    (Results Pending)       EKG and Other Studies Reviewed on Admission:   EKG: NSR. HR 70.  Nonspecific T wave abnormality.    ** Please Note: This note has been constructed using a voice recognition system. **

## 2024-08-11 NOTE — ASSESSMENT & PLAN NOTE
"Lab Results   Component Value Date    HGBA1C 9.9 (H) 08/25/2023       No results for input(s): \"POCGLU\" in the last 72 hours.    Blood Sugar Average: Last 72 hrs:  Continue home regimen Lantus 30 units twice daily, Humalog sliding scale insulin  Metformin, Trulicity held    "

## 2024-08-11 NOTE — Clinical Note
Case was discussed with im and the patient's admission status was agreed to be  to the service of Dr. Ross

## 2024-08-12 LAB
ANION GAP SERPL CALCULATED.3IONS-SCNC: 7 MMOL/L (ref 4–13)
APTT PPP: 106 SECONDS (ref 23–34)
BASOPHILS # BLD AUTO: 0.05 THOUSANDS/ÂΜL (ref 0–0.1)
BASOPHILS NFR BLD AUTO: 1 % (ref 0–1)
BUN SERPL-MCNC: 12 MG/DL (ref 5–25)
CALCIUM SERPL-MCNC: 8.6 MG/DL (ref 8.4–10.2)
CHLORIDE SERPL-SCNC: 103 MMOL/L (ref 96–108)
CO2 SERPL-SCNC: 23 MMOL/L (ref 21–32)
CREAT SERPL-MCNC: 0.49 MG/DL (ref 0.6–1.3)
EOSINOPHIL # BLD AUTO: 0.19 THOUSAND/ÂΜL (ref 0–0.61)
EOSINOPHIL NFR BLD AUTO: 2 % (ref 0–6)
ERYTHROCYTE [DISTWIDTH] IN BLOOD BY AUTOMATED COUNT: 15.7 % (ref 11.6–15.1)
EST. AVERAGE GLUCOSE BLD GHB EST-MCNC: 272 MG/DL
GFR SERPL CREATININE-BSD FRML MDRD: 124 ML/MIN/1.73SQ M
GLUCOSE SERPL-MCNC: 112 MG/DL (ref 65–140)
GLUCOSE SERPL-MCNC: 189 MG/DL (ref 65–140)
GLUCOSE SERPL-MCNC: 236 MG/DL (ref 65–140)
GLUCOSE SERPL-MCNC: 254 MG/DL (ref 65–140)
GLUCOSE SERPL-MCNC: 280 MG/DL (ref 65–140)
HBA1C MFR BLD: 11.1 %
HCT VFR BLD AUTO: 36.5 % (ref 34.8–46.1)
HGB BLD-MCNC: 12.7 G/DL (ref 11.5–15.4)
IMM GRANULOCYTES # BLD AUTO: 0.07 THOUSAND/UL (ref 0–0.2)
IMM GRANULOCYTES NFR BLD AUTO: 1 % (ref 0–2)
LYMPHOCYTES # BLD AUTO: 3.54 THOUSANDS/ÂΜL (ref 0.6–4.47)
LYMPHOCYTES NFR BLD AUTO: 44 % (ref 14–44)
MAGNESIUM SERPL-MCNC: 2.1 MG/DL (ref 1.9–2.7)
MCH RBC QN AUTO: 25.7 PG (ref 26.8–34.3)
MCHC RBC AUTO-ENTMCNC: 34.8 G/DL (ref 31.4–37.4)
MCV RBC AUTO: 74 FL (ref 82–98)
MONOCYTES # BLD AUTO: 0.37 THOUSAND/ÂΜL (ref 0.17–1.22)
MONOCYTES NFR BLD AUTO: 5 % (ref 4–12)
NEUTROPHILS # BLD AUTO: 3.86 THOUSANDS/ÂΜL (ref 1.85–7.62)
NEUTS SEG NFR BLD AUTO: 47 % (ref 43–75)
NRBC BLD AUTO-RTO: 0 /100 WBCS
PLATELET # BLD AUTO: 359 THOUSANDS/UL (ref 149–390)
PMV BLD AUTO: 10.3 FL (ref 8.9–12.7)
POTASSIUM SERPL-SCNC: 4.1 MMOL/L (ref 3.5–5.3)
RBC # BLD AUTO: 4.94 MILLION/UL (ref 3.81–5.12)
SODIUM SERPL-SCNC: 133 MMOL/L (ref 135–147)
WBC # BLD AUTO: 8.08 THOUSAND/UL (ref 4.31–10.16)

## 2024-08-12 PROCEDURE — 83036 HEMOGLOBIN GLYCOSYLATED A1C: CPT | Performed by: INTERNAL MEDICINE

## 2024-08-12 PROCEDURE — 82948 REAGENT STRIP/BLOOD GLUCOSE: CPT

## 2024-08-12 PROCEDURE — 80048 BASIC METABOLIC PNL TOTAL CA: CPT | Performed by: INTERNAL MEDICINE

## 2024-08-12 PROCEDURE — 83735 ASSAY OF MAGNESIUM: CPT | Performed by: INTERNAL MEDICINE

## 2024-08-12 PROCEDURE — 85730 THROMBOPLASTIN TIME PARTIAL: CPT | Performed by: INTERNAL MEDICINE

## 2024-08-12 PROCEDURE — 99233 SBSQ HOSP IP/OBS HIGH 50: CPT | Performed by: INTERNAL MEDICINE

## 2024-08-12 PROCEDURE — 85025 COMPLETE CBC W/AUTO DIFF WBC: CPT | Performed by: INTERNAL MEDICINE

## 2024-08-12 RX ORDER — INSULIN GLARGINE 100 [IU]/ML
40 INJECTION, SOLUTION SUBCUTANEOUS
Status: DISCONTINUED | OUTPATIENT
Start: 2024-08-12 | End: 2024-08-13 | Stop reason: HOSPADM

## 2024-08-12 RX ORDER — INSULIN LISPRO 100 [IU]/ML
8 INJECTION, SOLUTION INTRAVENOUS; SUBCUTANEOUS
Status: DISCONTINUED | OUTPATIENT
Start: 2024-08-12 | End: 2024-08-13 | Stop reason: HOSPADM

## 2024-08-12 RX ORDER — INSULIN LISPRO 100 [IU]/ML
4 INJECTION, SOLUTION INTRAVENOUS; SUBCUTANEOUS
Status: DISCONTINUED | OUTPATIENT
Start: 2024-08-12 | End: 2024-08-12

## 2024-08-12 RX ADMIN — ACETAMINOPHEN 975 MG: 325 TABLET, FILM COATED ORAL at 05:55

## 2024-08-12 RX ADMIN — HEPARIN SODIUM 13 UNITS/KG/HR: 10000 INJECTION, SOLUTION INTRAVENOUS at 01:49

## 2024-08-12 RX ADMIN — NICOTINE 1 PATCH: 14 PATCH, EXTENDED RELEASE TRANSDERMAL at 08:34

## 2024-08-12 RX ADMIN — DULOXETINE HYDROCHLORIDE 60 MG: 60 CAPSULE, DELAYED RELEASE ORAL at 08:33

## 2024-08-12 RX ADMIN — HYDROMORPHONE HYDROCHLORIDE 0.5 MG: 1 INJECTION, SOLUTION INTRAMUSCULAR; INTRAVENOUS; SUBCUTANEOUS at 08:40

## 2024-08-12 RX ADMIN — HYDROMORPHONE HYDROCHLORIDE 0.2 MG: 0.2 INJECTION, SOLUTION INTRAMUSCULAR; INTRAVENOUS; SUBCUTANEOUS at 16:08

## 2024-08-12 RX ADMIN — LISINOPRIL 20 MG: 20 TABLET ORAL at 08:33

## 2024-08-12 RX ADMIN — TOPIRAMATE 25 MG: 25 TABLET, FILM COATED ORAL at 08:33

## 2024-08-12 RX ADMIN — HYDROMORPHONE HYDROCHLORIDE 0.5 MG: 1 INJECTION, SOLUTION INTRAMUSCULAR; INTRAVENOUS; SUBCUTANEOUS at 12:57

## 2024-08-12 RX ADMIN — INSULIN GLARGINE 30 UNITS: 100 INJECTION, SOLUTION SUBCUTANEOUS at 08:33

## 2024-08-12 RX ADMIN — INSULIN LISPRO 3 UNITS: 100 INJECTION, SOLUTION INTRAVENOUS; SUBCUTANEOUS at 08:33

## 2024-08-12 RX ADMIN — INSULIN LISPRO 1 UNITS: 100 INJECTION, SOLUTION INTRAVENOUS; SUBCUTANEOUS at 18:35

## 2024-08-12 RX ADMIN — HYDROMORPHONE HYDROCHLORIDE 0.5 MG: 1 INJECTION, SOLUTION INTRAMUSCULAR; INTRAVENOUS; SUBCUTANEOUS at 18:35

## 2024-08-12 RX ADMIN — HYDROXYZINE HYDROCHLORIDE 50 MG: 50 TABLET, FILM COATED ORAL at 21:04

## 2024-08-12 RX ADMIN — ACETAMINOPHEN 975 MG: 325 TABLET, FILM COATED ORAL at 21:04

## 2024-08-12 RX ADMIN — RIVAROXABAN 20 MG: 20 TABLET, FILM COATED ORAL at 18:33

## 2024-08-12 RX ADMIN — HYDROMORPHONE HYDROCHLORIDE 0.2 MG: 0.2 INJECTION, SOLUTION INTRAMUSCULAR; INTRAVENOUS; SUBCUTANEOUS at 06:33

## 2024-08-12 RX ADMIN — OXYCODONE HYDROCHLORIDE 5 MG: 5 TABLET ORAL at 09:57

## 2024-08-12 RX ADMIN — ASPIRIN 81 MG 81 MG: 81 TABLET ORAL at 08:33

## 2024-08-12 RX ADMIN — ACETAMINOPHEN 975 MG: 325 TABLET, FILM COATED ORAL at 13:41

## 2024-08-12 RX ADMIN — OXYCODONE HYDROCHLORIDE 5 MG: 5 TABLET ORAL at 21:04

## 2024-08-12 RX ADMIN — HYDROMORPHONE HYDROCHLORIDE 0.2 MG: 0.2 INJECTION, SOLUTION INTRAMUSCULAR; INTRAVENOUS; SUBCUTANEOUS at 02:17

## 2024-08-12 RX ADMIN — ATORVASTATIN CALCIUM 40 MG: 40 TABLET, FILM COATED ORAL at 16:08

## 2024-08-12 RX ADMIN — INSULIN GLARGINE 40 UNITS: 100 INJECTION, SOLUTION SUBCUTANEOUS at 21:03

## 2024-08-12 RX ADMIN — HYDROMORPHONE HYDROCHLORIDE 0.5 MG: 1 INJECTION, SOLUTION INTRAMUSCULAR; INTRAVENOUS; SUBCUTANEOUS at 04:20

## 2024-08-12 RX ADMIN — INSULIN LISPRO 8 UNITS: 100 INJECTION, SOLUTION INTRAVENOUS; SUBCUTANEOUS at 18:36

## 2024-08-12 RX ADMIN — HYDROMORPHONE HYDROCHLORIDE 0.5 MG: 1 INJECTION, SOLUTION INTRAMUSCULAR; INTRAVENOUS; SUBCUTANEOUS at 22:23

## 2024-08-12 RX ADMIN — TOPIRAMATE 25 MG: 25 TABLET, FILM COATED ORAL at 18:33

## 2024-08-12 RX ADMIN — INSULIN LISPRO 3 UNITS: 100 INJECTION, SOLUTION INTRAVENOUS; SUBCUTANEOUS at 13:41

## 2024-08-12 RX ADMIN — INSULIN HUMAN 15 UNITS: 100 INJECTION, SUSPENSION SUBCUTANEOUS at 09:57

## 2024-08-12 RX ADMIN — INSULIN LISPRO 8 UNITS: 100 INJECTION, SOLUTION INTRAVENOUS; SUBCUTANEOUS at 13:41

## 2024-08-12 RX ADMIN — PRAZOSIN HYDROCHLORIDE 1 MG: 1 CAPSULE ORAL at 22:23

## 2024-08-12 NOTE — PROGRESS NOTES
"[facility]  Progress Note  Name: Tony Roberto I  MRN: [id number]  Unit/Bed#: MS 104Lo01 I Date of Admission: 8/11/2024   Date of Service: 8/12/2024 I Hospital Day: 1    Assessment & Plan   Peripheral arterial disease (HCC)  Assessment & Plan  Presents with severe right foot pain since 4 days  History of PAD s/p left AKA on ASA 81 mg daily, atorvastatin 40 mg daily and Xarelto 20 mg daily  CTA abdominal with runoff - \"Mild atherosclerotic irregularity of the left external iliac artery with estimated stenosis of less than 30%. Focal greater than 70% stenosis of the distal right superficial femoral artery at the level of the abductor canal for a distance of 1.5 cm. Additional 50 to 60% stenosis of the above-the-knee right popliteal artery 5 cm superior to the knee joint for a distance of 0.6 cm. Three-vessel runoff in the right calf with mild atherosclerotic irregularity of the proximal peroneal and posterior tibial arteries\"  LEADS - Right: \"Evidence of 50-75% stenosis at the distal superficial femoral and proximal popliteal artery. Diffuse tibioperoneal vessel disease observed. Ankle/Brachial index: 0.79 , which is in the moderate category. Prior: 0.86. Metatarsal pressure of 129  mmHg. Great toe pressure of 100 mmHg, within the healing range. PVR/ PPG tracings are  dampened.\"  Surgery evaluated the patient and recommended medical management and outpatient workup.  Can be resumed back to oral anticoagulant.  Hence we will discontinue heparin drip.  Ct ASA, statin  Pain control -however suspect pain medication seeking behavior.  Will mention to hold pain medication patient is sleepy or drowsy.    S/P AKA (above knee amputation) unilateral, left (HCC)  Assessment & Plan  Supportive care    Class 3 severe obesity due to excess calories without serious comorbidity with body mass index (BMI) of 45.0 to 49.9 in adult ([facility])  Assessment & Plan  Affects all aspects of care  Lifestyle " modification    Type 2 diabetes mellitus with diabetic polyneuropathy, with long-term current use of insulin (HCC)  Assessment & Plan  Lab Results   Component Value Date    HGBA1C 9.9 (H) 08/25/2023       Recent Labs     08/11/24  1321 08/11/24  1649 08/11/24  2109 08/12/24  0726   POCGLU 210* 344* 417* 254*       Blood Sugar Average: Last 72 hrs:  (P) 306.25  Increase the patient's Lantus to 40 units nightly, add lispro 8 units 3 times daily with meals.  At home she takes 15 units sometimes.  Metformin, Trulicity held -can be resumed on discharge.      Tobacco abuse  Assessment & Plan  Nicotine patch  Smoking cessation advised    Bipolar disorder  Assessment & Plan  Continue home meds Duloxetine 60 mg daily, Hydroxyzine 50 mg at bedtime as needed, Topamax 25 mg twice daily                 Mobility:     Basic Mobility Inpatient Raw Score: 13  -HL Goal: 4: Move to chair/commode  -HLM Achieved: 3: Sit at edge of bed  HLM Goal achieved. Continue to encourage appropriate mobility.    Pharmacologic VTE Prophylaxis: Yes   Mechanical VTE Prophylaxis in Place: No   Patient Centered Rounds: I have performed bedside rounds with the Nursing staff today.   Current Length of Stay: 1 day(s)  Current Patient Status: Inpatient   Code Status: Level 1 - Full Code  Time Spent for Care:  35 minutes.  More than 50% of total time spent on counseling and coordination of care as described above.  Discussions with Specialists or Other Care Team Provider: Yes Vascular Sx.   Education and Discussions with Family / Patient: No, patient refused.  Discharge Plan: Possible discharge tomorrow if blood sugars better than today. I.e not in 400s;  Case Discussed with  regarding updating plan of care and disposition planning.   Certification Statement: The patient will continue to require additional inpatient hospital stay due to uncontrolled type 2 diabetes, right leg pain intractable secondary to PAD.    Subjective:   I have seen  and Examined the patient at the bedside. No CP or Sob. No fevers or chills, No nausea or vomiting. Overnight events reviewed with the RN. No Other complains.  Patient still complains of right leg pain.  However is lying in the bed and sleeping almost.  She mentioned that the pain is worse especially when she tries to stand up.  But according to the nurse she had 2 trays of breakfast also.  Noncompliant with diabetic diet.    Review of System:   Denies any CP or SOB  Denies any Cough or Cold  Denies any Fevers or chills.   Denies any focal tingling numbness or weakness in any extremities.   Denies any abdominal pain, Nausea or vomiting.     Objective:   Temp (24hrs), Av.6 °F (36.4 °C), Min:97.6 °F (36.4 °C), Max:97.7 °F (36.5 °C)    Temp:  [97.6 °F (36.4 °C)-97.7 °F (36.5 °C)] 97.6 °F (36.4 °C)  HR:  [72-91] 74  Resp:  [16-18] 16  BP: (112-165)/(62-80) 112/62  SpO2:  [94 %-99 %] 98 %  Body mass index is 47.49 kg/m².     Input and Output Summary (last 24 hours):     Intake/Output Summary (Last 24 hours) at 2024 1450  Last data filed at 2024 1701  Gross per 24 hour   Intake --   Output 200 ml   Net -200 ml     I/O         08/10 0701   0700  0701   0700  0701   0700    Urine (mL/kg/hr)  200     Total Output  200     Net  -200                    Physical Exam:   General : Alert, Awake and oriented x 3, NAD.  Morbidly obese.  Neck : Supple.  Short neck.  Eyes:  DAMON, EOMI.   CVS : S1, S2, RRR.   R/S : Clear to auscultate anteriorly.  Decreased breath sounds bases however poor respiratory effort.  Abd: Soft, NT, ND. Bs+ve  Extremity: Left BKA noted, right lower extremity tender to palpate but even on light touch.  Right lower extremity pedal pulses slightly diminished.  Skin: No acute Rash noted.   CNS: No acute FND.     Additional Data:     Labs, Culture & Imaging Data Reviewed:    Results from last 7 days   Lab Units 24  0517   WBC Thousand/uL 8.08   HEMOGLOBIN g/dL 12.7    HEMATOCRIT % 36.5   PLATELETS Thousands/uL 359     Results from last 7 days   Lab Units 08/12/24  0517 08/11/24  0843   POTASSIUM mmol/L 4.1 3.4*   CHLORIDE mmol/L 103 104   CO2 mmol/L 23 24   BUN mg/dL 12 11   CREATININE mg/dL 0.49* 0.43*   CALCIUM mg/dL 8.6 8.7   ALK PHOS U/L  --  102   ALT U/L  --  15   AST U/L  --  8*     Results from last 7 days   Lab Units 08/11/24  0843   INR  0.94     Lab Results   Component Value Date    HGBA1C 11.1 (H) 08/12/2024      CTA abdominal w run off w wo contrast   Final Result by Jordy Henley MD (08/11 1216)      No hemodynamically significant inflow arterial stenosis in the abdomen or pelvis.      Mild atherosclerotic irregularity of the left external iliac artery with estimated stenosis of less than 30%.      Focal greater than 70% stenosis of the distal right superficial femoral artery at the level of the abductor canal for a distance of 1.5 cm.      Additional 50 to 60% stenosis of the above-the-knee right popliteal artery 5 cm superior to the knee joint for a distance of 0.6 cm      Three-vessel runoff in the right calf with mild atherosclerotic irregularity of the proximal peroneal and posterior tibial arteries.      Occlusion of the left superficial femoral artery throughout its course in the left thigh. Left superficial femoral artery contains an occluded long segment stent. No distal arterial reconstitution. Left leg above-the-knee amputation      Extensive tree-in-bud nodularity of the lung bases that is nonspecific may represent a follicular bronchiolitis      The study was marked in EPIC for immediate notification..      Workstation performed: [id number]         VAS ARTERIAL DUPLEX-LOWER LIMB UNILATERAL   Final Result by Savi Langford MD (08/11 8455)      XR chest 1 view portable   ED Interpretation by Nabeel Quiñonez MD (08/11 0902)   Per my independent interpretation:  no acute cardiopulmonary process       Final Result by Glory Buckner MD (08/11  "0840)      No acute cardiopulmonary disease.            Workstation performed: [id number]             Cultures:   Blood Culture:   Lab Results   Component Value Date    BLOODCX No Growth After 5 Days. 10/02/2021    BLOODCX No Growth After 5 Days. 10/02/2021     Urine Culture:   Lab Results   Component Value Date    URINECX >100,000 cfu/ml Proteus mirabilis (A) 04/07/2023    URINECX 80,000-89,000 cfu/ml 04/07/2023     Sputum Culture: No components found for: \"SPUTUMCX\"  Wound Culture:   Lab Results   Component Value Date    WOUNDCULT 1+ Growth of Beta Hemolytic Streptococcus Group B (A) 09/04/2023    WOUNDCULT 2+ Growth of 09/04/2023       Last 24 Hours Medication List:   Current Facility-Administered Medications   Medication Dose Route Frequency Provider Last Rate    acetaminophen  975 mg Oral Q8H ABDULKADIR Rosanne Medina MD      albuterol  2 puff Inhalation Q4H PRN Rosanne Medina MD      aspirin  81 mg Oral Daily Rosanne Medina MD      atorvastatin  40 mg Oral Daily With Dinner Rosanne Medina MD      DULoxetine  60 mg Oral Daily Rosanne Medina MD      Fluticasone Furoate-Vilanterol  1 puff Inhalation Daily Rosanne Medina MD      heparin (porcine)  3-30 Units/kg/hr (Order-Specific) Intravenous Titrated Rosanne Medina MD 13 Units/kg/hr (08/12/24 0149)    HYDROmorphone  0.5 mg Intravenous Q4H PRN Rosanne Medina MD      HYDROmorphone  0.2 mg Intravenous Q4H PRN Jhon Julian MD      hydrOXYzine HCL  50 mg Oral HS PRN Rosanne Medina MD      insulin glargine  30 Units Subcutaneous QAM Rosanne Medina MD      insulin glargine  40 Units Subcutaneous HS Jhon Julian MD      insulin lispro  1-5 Units Subcutaneous HS Rosanne Medina MD      insulin lispro  1-6 Units Subcutaneous TID AC Rosanne Medina MD      insulin lispro  8 Units Subcutaneous TID With Meals Jhon Julian MD      lisinopril  20 mg Oral Daily Rosanne Medina MD      nicotine  1 patch Transdermal Daily " Rosanne Medina MD      ondansetron  4 mg Intravenous Q6H PRN Rosanne Medina MD      oxyCODONE  5 mg Oral Q4H PRN Jhon Julian MD      prazosin  1 mg Oral HS Rosanne Medina MD      topiramate  25 mg Oral BID Rosanne Medina MD           Patient is at moderate risk for morbidity and mortality due to above mentioned illness and comorbidities.

## 2024-08-12 NOTE — RESPIRATORY THERAPY NOTE
08/12/24 0840   Respiratory Protocol   Protocol Initiated? Yes   Protocol Selection Respiratory   Language Barrier? No   Medical & Social History Reviewed? Yes   Diagnostic Studies Reviewed? Yes   Physical Assessment Performed? Yes   Respiratory Plan Home Bronchodilator Patient pathway;No distress/Pulmonary history   Respiratory Assessment   Assessment Type Assess only   General Appearance Alert;Awake   Respiratory Pattern Normal   Chest Assessment Chest expansion symmetrical   Bilateral Breath Sounds Diminished   Cough None   Resp Comments pt in no distress   O2 Device RA   Subjective Data pt taking home meds   Additional Assessments   Pulse 74   Respirations 16   SpO2 98 %   Position Sitting

## 2024-08-12 NOTE — ASSESSMENT & PLAN NOTE
Lab Results   Component Value Date    HGBA1C 9.9 (H) 08/25/2023       Recent Labs     08/11/24  1321 08/11/24  1649 08/11/24  2109 08/12/24  0726   POCGLU 210* 344* 417* 254*       Blood Sugar Average: Last 72 hrs:  (P) 306.25  Increase the patient's Lantus to 40 units nightly, add lispro 8 units 3 times daily with meals.  At home she takes 15 units sometimes.  Metformin, Trulicity held -can be resumed on discharge.

## 2024-08-12 NOTE — ASSESSMENT & PLAN NOTE
"Presents with severe right foot pain since 4 days  History of PAD s/p left AKA on ASA 81 mg daily, atorvastatin 40 mg daily and Xarelto 20 mg daily  CTA abdominal with runoff - \"Mild atherosclerotic irregularity of the left external iliac artery with estimated stenosis of less than 30%. Focal greater than 70% stenosis of the distal right superficial femoral artery at the level of the abductor canal for a distance of 1.5 cm. Additional 50 to 60% stenosis of the above-the-knee right popliteal artery 5 cm superior to the knee joint for a distance of 0.6 cm. Three-vessel runoff in the right calf with mild atherosclerotic irregularity of the proximal peroneal and posterior tibial arteries\"  LEADS - Right: \"Evidence of 50-75% stenosis at the distal superficial femoral and proximal popliteal artery. Diffuse tibioperoneal vessel disease observed. Ankle/Brachial index: 0.79 , which is in the moderate category. Prior: 0.86. Metatarsal pressure of 129  mmHg. Great toe pressure of 100 mmHg, within the healing range. PVR/ PPG tracings are  dampened.\"  Surgery evaluated the patient and recommended medical management and outpatient workup.  Can be resumed back to oral anticoagulant.  Hence we will discontinue heparin drip.  Ct ASA, statin  Pain control -however suspect pain medication seeking behavior.  Will mention to hold pain medication patient is sleepy or drowsy.  "

## 2024-08-12 NOTE — UTILIZATION REVIEW
Initial Clinical Review    Admission: Date/Time/Statement:   Admission Orders (From admission, onward)       Ordered        08/11/24 1211  INPATIENT ADMISSION  Once                          Orders Placed This Encounter   Procedures    INPATIENT ADMISSION     Standing Status:   Standing     Number of Occurrences:   1     Order Specific Question:   Level of Care     Answer:   Med Surg [16]     Order Specific Question:   Estimated length of stay     Answer:   More than 2 Midnights     Order Specific Question:   Certification     Answer:   I certify that inpatient services are medically necessary for this patient for a duration of greater than two midnights. See H&P and MD Progress Notes for additional information about the patient's course of treatment.     ED Arrival Information       Expected   -    Arrival   8/11/2024 07:38    Acuity   Urgent              Means of arrival   Ambulance    Escorted by   [facility]   Hospitalist    Admission type   Emergency              Arrival complaint   flank pain             Chief Complaint   Patient presents with    Foot Pain     Patient to ED via EMS from home with c/o right foot pain for last 4 days. Patient has hx of DM and reports pain is burning/stabbing and it was the same as her left foot prior to her amputation.        Initial Presentation: 38 y.o. female  to ER From home via EMS.    PMH of type 2 diabetes, asthma, COPD, bipolar 1 disorder, hypertension, peripheral arterial disease who presents with severe pain over the sole of the right foot since 4 days which has been constant.   H/o PAD  left above-knee amputation.    8/11   to ER for evaluation of a 4-day history of gradual onset, atraumatic, constant, progressively worsening, burning/numbing in character right lower extremity pain.  Movement exacerbates her symptoms.  also reports polyuria  EXAM:  Rt DP palpable.   Tenderness (Distal RLE) present. RLE  paresthesias    LEAD 8/11/24 demonstrates  right distal SFA/pop 50-75% stenosis with NIKKI 0.79/LPH221/WXC854  CTA abd runoff reviewed demonstrated moderate distal right SFA stenosis and AK pop mild stenosis with 3 vessel runoff    8/11    PER VASCULAR (tele [facility])  CONSULT:   PAD with RLE paresthesias. No acute findings on CT and NIKKI/pressures not consistent with symptoms of rest pain/paresthesias. She is nonambulatory. Continue anticoagulation. OK to transition back to PO anticoagulation with outpatient follow-up from vascular standpoint.  Initiate IV HEPARIN GTT      Anticipated Length of Stay/Certification Statement: Patient will be admitted on an inpatient basis with an anticipated length of stay of greater than 2 midnights secondary to PAD.     Date: 8/12      Day 2:    Patient still complains of right leg pain.  However is lying in the bed and sleeping almost.  She mentioned that the pain is worse especially when she tries to stand up.  Noncompliant with diabetic diet. PER VASCULAR recommend medical management and outpatient workup.  Can be resumed back to oral anticoagulant.  Hence we will discontinue heparin drip.   Ct ASA, statin   Pain control -however suspect pain medication seeking behavior.  Will mention to hold pain medication patient is sleepy or drowsy.           ED Triage Vitals [08/11/24 0740]   Temperature Pulse Respirations Blood Pressure SpO2 Pain Score   97.8 °F (36.6 °C) 83 18 165/87 99 % 10 - Worst Possible Pain     Weight (last 2 days)       Date/Time Weight    08/11/24 2034 114 (251.32)    08/11/24 1050 114 (251.32)            Vital Signs (last 3 days)       Date/Time Temp Pulse Resp BP MAP (mmHg) SpO2 Bianca Coma Scale Score Pain    08/12/24 1835 -- -- -- -- -- -- -- 10 - Worst Possible Pain    08/12/24 16:41:10 98.2 °F (36.8 °C) 76 17 127/78 94 97 % -- --    08/12/24 1522 98 °F (36.7 °C) 84 18 138/67 93 99 % -- --    08/12/24 1257 -- -- -- -- -- -- -- 10 - Worst Possible Pain    08/12/24 0700 97.6 °F (36.4 °C) 72 18 112/62 82 99 %  -- --    08/12/24 0633 -- -- -- -- -- -- -- 10 - Worst Possible Pain    08/11/24 2234 97.7 °F (36.5 °C) 91 18 135/75 97 99 % -- --    08/11/24 2157 -- -- -- -- -- -- -- 6    08/11/24 2127 -- -- -- -- -- -- -- 10 - Worst Possible Pain    08/11/24 2126 -- -- -- 127/71 -- -- -- --    08/11/24 1917 -- -- -- -- -- -- 15 --    08/11/24 1900 -- -- -- -- -- -- -- 10 - Worst Possible Pain    08/11/24 1615 97.6 °F (36.4 °C) 72 17 129/74 96 94 % -- --    08/11/24 1519 -- 84 18 165/80 113 -- -- 10 - Worst Possible Pain    08/11/24 1319 -- -- -- -- -- -- -- 6    08/11/24 1023 -- 70 -- 166/82 115 99 % -- --    08/11/24 1013 -- 77 -- 160/81 115 100 % -- --    08/11/24 0926 -- -- -- -- -- -- -- 10 - Worst Possible Pain    08/11/24 0800 -- 81 18 155/79 110 100 % -- --    08/11/24 0743 -- -- -- -- -- -- 15 --    08/11/24 0740 97.8 °F (36.6 °C) 83 18 165/87 -- 99 % -- 10 - Worst Possible Pain              Pertinent Labs/Diagnostic Test Results:   Radiology:  CTA abdominal w run off w wo contrast   Final Interpretation by Jordy Henley MD (08/11 1216)      No hemodynamically significant inflow arterial stenosis in the abdomen or pelvis.      Mild atherosclerotic irregularity of the left external iliac artery with estimated stenosis of less than 30%.      Focal greater than 70% stenosis of the distal right superficial femoral artery at the level of the abductor canal for a distance of 1.5 cm.      Additional 50 to 60% stenosis of the above-the-knee right popliteal artery 5 cm superior to the knee joint for a distance of 0.6 cm      Three-vessel runoff in the right calf with mild atherosclerotic irregularity of the proximal peroneal and posterior tibial arteries.      Occlusion of the left superficial femoral artery throughout its course in the left thigh. Left superficial femoral artery contains an occluded long segment stent. No distal arterial reconstitution. Left leg above-the-knee amputation      Extensive tree-in-bud  nodularity of the lung bases that is nonspecific may represent a follicular bronchiolitis      The study was marked in EPIC for immediate notification..      Workstation performed: [id number]         VAS ARTERIAL DUPLEX-LOWER LIMB UNILATERAL   Final Interpretation by Savi Langford MD (08/11 1738)      XR chest 1 view portable   ED Interpretation by Nabeel Quiñonez MD (08/11 0902)   Per my independent interpretation:  no acute cardiopulmonary process       Final Interpretation by Glory Buckner MD (08/11 0840)      No acute cardiopulmonary disease.            Workstation performed: [id number]                 Results from last 7 days   Lab Units 08/12/24  0517 08/11/24  0843   WBC Thousand/uL 8.08 9.63   HEMOGLOBIN g/dL 12.7 13.1   HEMATOCRIT % 36.5 37.0   PLATELETS Thousands/uL 359 398*   TOTAL NEUT ABS Thousands/µL 3.86 5.44         Results from last 7 days   Lab Units 08/12/24  0517 08/11/24  0843   SODIUM mmol/L 133* 135   POTASSIUM mmol/L 4.1 3.4*   CHLORIDE mmol/L 103 104   CO2 mmol/L 23 24   ANION GAP mmol/L 7 7   BUN mg/dL 12 11   CREATININE mg/dL 0.49* 0.43*   EGFR ml/min/1.73sq m 124 130   CALCIUM mg/dL 8.6 8.7   MAGNESIUM mg/dL 2.1 1.8*     Results from last 7 days   Lab Units 08/11/24  0843   AST U/L 8*   ALT U/L 15   ALK PHOS U/L 102   TOTAL PROTEIN g/dL 6.7   ALBUMIN g/dL 3.5   TOTAL BILIRUBIN mg/dL 0.33     Results from last 7 days   Lab Units 08/12/24  1606 08/12/24  1056 08/12/24  0726 08/11/24  2109 08/11/24  1649 08/11/24  1321   POC GLUCOSE mg/dl 189* 236* 254* 417* 344* 210*     Results from last 7 days   Lab Units 08/12/24  0517 08/11/24  0843   GLUCOSE RANDOM mg/dL 280* 210*         Results from last 7 days   Lab Units 08/12/24  0517   HEMOGLOBIN A1C % 11.1*   EAG mg/dl 272     BETA-HYDROXYBUTYRATE   Date Value Ref Range Status   08/24/2023 1.1 (H) <0.6 mmol/L Final   05/15/2019 0.3 <0.6 mmol/L Final      Results from last 7 days   Lab Units 08/11/24  1241 08/11/24  1042 08/11/24  0843    HS TNI 0HR ng/L  --   --  4   HS TNI 2HR ng/L  --  3  --    HSTNI D2 ng/L  --  -1  --    HS TNI 4HR ng/L 4  --   --    HSTNI D4 ng/L 0  --   --          Results from last 7 days   Lab Units 08/12/24  0039 08/11/24  1716 08/11/24  1108 08/11/24  0843   PROTIME seconds  --   --   --  13.3   INR   --   --   --  0.94   PTT seconds 106* >210* 27 27     Results from last 7 days   Lab Units 08/11/24  1722   CLARITY UA  Clear   COLOR UA  Light Yellow   SPEC GRAV UA  >=1.050*   PH UA  6.0   GLUCOSE UA mg/dl >=1000 (1%)*   KETONES UA mg/dl Negative   BLOOD UA  Negative   PROTEIN UA mg/dl Negative   NITRITE UA  Negative   BILIRUBIN UA  Negative   UROBILINOGEN UA (BE) mg/dl <2.0   LEUKOCYTES UA  Elevated glucose may cause decreased leukocyte values. See urine microscopic for UWBC result*   WBC UA /hpf None Seen   RBC UA /hpf 2-4*   BACTERIA UA /hpf Occasional   EPITHELIAL CELLS WET PREP /hpf Occasional       ED Treatment-Medication Administration from 08/11/2024 0738 to 08/11/2024 1429         Date/Time Order Dose Route Action     08/11/2024 0818 gabapentin (NEURONTIN) capsule 300 mg 300 mg Oral Given     08/11/2024 0818 lidocaine (LIDODERM) 5 % patch 1 patch 1 patch Topical Medication Applied     08/11/2024 0818 acetaminophen (TYLENOL) tablet 650 mg 650 mg Oral Given     08/11/2024 0926 fentaNYL injection 50 mcg 50 mcg Intravenous Given     08/11/2024 1040 fentaNYL injection 50 mcg 50 mcg Intravenous Given     08/11/2024 1136 heparin (porcine) 25,000 units in 0.45% NaCl 250 mL infusion (premix) 18 Units/kg/hr Intravenous New Bag     08/11/2024 1130 fentaNYL injection 50 mcg 50 mcg Intravenous Given     08/11/2024 1233 HYDROmorphone (DILAUDID) injection 0.5 mg 0.5 mg Intravenous Given     08/11/2024 1233 oxyCODONE (ROXICODONE) IR tablet 5 mg 5 mg Oral Given     08/11/2024 1339 aspirin chewable tablet 81 mg 81 mg Oral Given     08/11/2024 1339 lisinopril (ZESTRIL) tablet 20 mg 20 mg Oral Given     08/11/2024 1338 nicotine  (NICODERM CQ) 14 mg/24hr TD 24 hr patch 1 patch 1 patch Transdermal Medication Applied     08/11/2024 1339 acetaminophen (TYLENOL) tablet 975 mg 975 mg Oral Given     08/11/2024 1342 insulin lispro (HumALOG/ADMELOG) 100 units/mL subcutaneous injection 1-6 Units 2 Units Subcutaneous Given     08/11/2024 1342 insulin glargine (LANTUS) subcutaneous injection 30 Units 0.3 mL 30 Units Subcutaneous Given     08/11/2024 1339 potassium chloride (Klor-Con M20) CR tablet 20 mEq 20 mEq Oral Given            Past Medical History:   Diagnosis Date    Asthma     Atherosclerosis     Bipolar 1 disorder (HCC)     COPD (chronic obstructive pulmonary disease) (HCC)     Depression     Diabetes mellitus (HCC)     Hypertension     Psychiatric disorder     cutting history    PTSD (post-traumatic stress disorder)     Schizoaffective disorder (HCC)     Tendonitis      Present on Admission:   Peripheral arterial disease (HCC)   Hypertension   Bipolar disorder   COPD (chronic obstructive pulmonary disease)/asthma   Tobacco abuse      Admitting Diagnosis: Foot pain [M79.673]  Peripheral vascular disease (HCC) [I73.9]  Elevated blood pressure reading [R03.0]  Acute leg pain, right [M79.604]  Age/Sex: 38 y.o. female    Admission Orders:   see above .    b/l le scd's ,  I/O q shift,  routine VS.  Accucks qid w/SSI.  Lo carb diet.      8/11  @  1933  -  Given 1x  IV Mg 2 gm     Scheduled Medications:  acetaminophen, 975 mg, Oral, Q8H ABDULKADIR  aspirin, 81 mg, Oral, Daily  atorvastatin, 40 mg, Oral, Daily With Dinner  DULoxetine, 60 mg, Oral, Daily  Fluticasone Furoate-Vilanterol, 1 puff, Inhalation, Daily  insulin glargine, 30 Units, Subcutaneous, QAM  insulin glargine, 40 Units, Subcutaneous, HS  insulin lispro, 1-5 Units, Subcutaneous, HS  insulin lispro, 1-6 Units, Subcutaneous, TID AC  insulin lispro, 8 Units, Subcutaneous, TID With Meals  lisinopril, 20 mg, Oral, Daily  nicotine, 1 patch, Transdermal, Daily  prazosin, 1 mg, Oral, HS  rivaroxaban,  20 mg, Oral, Daily With Dinner  topiramate, 25 mg, Oral, BID      Continuous IV Infusions:     PRN Meds:  albuterol, 2 puff, Inhalation, Q4H PRN  HYDROmorphone, 0.5 mg, Intravenous, Q4H PRN  ... 8/11 x2 ....  8/12 x4   HYDROmorphone, 0.2 mg, Intravenous, Q4H PRN  ... 8/11 x2 ....  8/12 x3  hydrOXYzine HCL, 50 mg, Oral, HS PRN  ondansetron, 4 mg, Intravenous, Q6H PRN  oxyCODONE, 5 mg, Oral, Q4H PRN...  8/11 @ 1233  ... 8/12 @ 1000        IP CONSULT TO VASCULAR SURGERY    Network Utilization Review Department  ATTENTION: Please call with any questions or concerns to [phone] and carefully listen to the prompts so that you are directed to the right person. All voicemails are confidential.   For Discharge needs, contact Care Management DC Support Team at [phone] opt. [phone]  Send all requests for admission clinical reviews, approved or denied determinations and any other requests to dedicated fax number below belonging to the campus where the patient is receiving treatment. List of dedicated fax numbers for the Facilities:  FACILITY NAME UR FAX NUMBER   ADMISSION DENIALS (Administrative/Medical Necessity) [phone]   DISCHARGE SUPPORT TEAM (NETWORK) [phone]   PARENT CHILD HEALTH (Maternity/NICU/Pediatrics) [phone]   [facility] [phone]   [facility] [phone]   [facility] [phone]   [facility] [phone]   [facility] [phone]   [facility] [phone]   [facility] [phone]   [facility] [phone]   [facility] [phone]   [facility] [phone]   [facility] [phone]   [facility] [phone]

## 2024-08-13 VITALS
WEIGHT: 251.32 LBS | SYSTOLIC BLOOD PRESSURE: 140 MMHG | RESPIRATION RATE: 18 BRPM | BODY MASS INDEX: 47.49 KG/M2 | DIASTOLIC BLOOD PRESSURE: 86 MMHG | TEMPERATURE: 97.8 F | HEART RATE: 69 BPM | OXYGEN SATURATION: 99 %

## 2024-08-13 LAB
ANION GAP SERPL CALCULATED.3IONS-SCNC: 6 MMOL/L (ref 4–13)
BASOPHILS # BLD AUTO: 0.03 THOUSANDS/ÂΜL (ref 0–0.1)
BASOPHILS NFR BLD AUTO: 0 % (ref 0–1)
BUN SERPL-MCNC: 13 MG/DL (ref 5–25)
CALCIUM SERPL-MCNC: 8.4 MG/DL (ref 8.4–10.2)
CHLORIDE SERPL-SCNC: 104 MMOL/L (ref 96–108)
CO2 SERPL-SCNC: 25 MMOL/L (ref 21–32)
CREAT SERPL-MCNC: 0.44 MG/DL (ref 0.6–1.3)
EOSINOPHIL # BLD AUTO: 0.15 THOUSAND/ÂΜL (ref 0–0.61)
EOSINOPHIL NFR BLD AUTO: 2 % (ref 0–6)
ERYTHROCYTE [DISTWIDTH] IN BLOOD BY AUTOMATED COUNT: 15.4 % (ref 11.6–15.1)
EST. AVERAGE GLUCOSE BLD GHB EST-MCNC: 260 MG/DL
GFR SERPL CREATININE-BSD FRML MDRD: 129 ML/MIN/1.73SQ M
GLUCOSE SERPL-MCNC: 125 MG/DL (ref 65–140)
GLUCOSE SERPL-MCNC: 132 MG/DL (ref 65–140)
GLUCOSE SERPL-MCNC: 143 MG/DL (ref 65–140)
GLUCOSE SERPL-MCNC: 230 MG/DL (ref 65–140)
HBA1C MFR BLD: 10.7 %
HCT VFR BLD AUTO: 35.8 % (ref 34.8–46.1)
HGB BLD-MCNC: 12.4 G/DL (ref 11.5–15.4)
IMM GRANULOCYTES # BLD AUTO: 0.04 THOUSAND/UL (ref 0–0.2)
IMM GRANULOCYTES NFR BLD AUTO: 1 % (ref 0–2)
LYMPHOCYTES # BLD AUTO: 2.44 THOUSANDS/ÂΜL (ref 0.6–4.47)
LYMPHOCYTES NFR BLD AUTO: 30 % (ref 14–44)
MCH RBC QN AUTO: 25.7 PG (ref 26.8–34.3)
MCHC RBC AUTO-ENTMCNC: 34.6 G/DL (ref 31.4–37.4)
MCV RBC AUTO: 74 FL (ref 82–98)
MONOCYTES # BLD AUTO: 0.46 THOUSAND/ÂΜL (ref 0.17–1.22)
MONOCYTES NFR BLD AUTO: 6 % (ref 4–12)
NEUTROPHILS # BLD AUTO: 5.04 THOUSANDS/ÂΜL (ref 1.85–7.62)
NEUTS SEG NFR BLD AUTO: 61 % (ref 43–75)
NRBC BLD AUTO-RTO: 0 /100 WBCS
PLATELET # BLD AUTO: 375 THOUSANDS/UL (ref 149–390)
PMV BLD AUTO: 10.1 FL (ref 8.9–12.7)
POTASSIUM SERPL-SCNC: 3.8 MMOL/L (ref 3.5–5.3)
RBC # BLD AUTO: 4.83 MILLION/UL (ref 3.81–5.12)
SODIUM SERPL-SCNC: 135 MMOL/L (ref 135–147)
WBC # BLD AUTO: 8.16 THOUSAND/UL (ref 4.31–10.16)

## 2024-08-13 PROCEDURE — 99239 HOSP IP/OBS DSCHRG MGMT >30: CPT | Performed by: PHYSICIAN ASSISTANT

## 2024-08-13 PROCEDURE — 80048 BASIC METABOLIC PNL TOTAL CA: CPT | Performed by: INTERNAL MEDICINE

## 2024-08-13 PROCEDURE — 85025 COMPLETE CBC W/AUTO DIFF WBC: CPT | Performed by: INTERNAL MEDICINE

## 2024-08-13 PROCEDURE — 82948 REAGENT STRIP/BLOOD GLUCOSE: CPT

## 2024-08-13 RX ADMIN — INSULIN LISPRO 3 UNITS: 100 INJECTION, SOLUTION INTRAVENOUS; SUBCUTANEOUS at 08:21

## 2024-08-13 RX ADMIN — HYDROMORPHONE HYDROCHLORIDE 0.5 MG: 1 INJECTION, SOLUTION INTRAMUSCULAR; INTRAVENOUS; SUBCUTANEOUS at 08:26

## 2024-08-13 RX ADMIN — NICOTINE 1 PATCH: 14 PATCH, EXTENDED RELEASE TRANSDERMAL at 08:21

## 2024-08-13 RX ADMIN — INSULIN LISPRO 8 UNITS: 100 INJECTION, SOLUTION INTRAVENOUS; SUBCUTANEOUS at 08:21

## 2024-08-13 RX ADMIN — LISINOPRIL 20 MG: 20 TABLET ORAL at 08:20

## 2024-08-13 RX ADMIN — INSULIN GLARGINE 30 UNITS: 100 INJECTION, SOLUTION SUBCUTANEOUS at 08:20

## 2024-08-13 RX ADMIN — ASPIRIN 81 MG 81 MG: 81 TABLET ORAL at 08:20

## 2024-08-13 RX ADMIN — HYDROMORPHONE HYDROCHLORIDE 0.5 MG: 1 INJECTION, SOLUTION INTRAMUSCULAR; INTRAVENOUS; SUBCUTANEOUS at 03:12

## 2024-08-13 RX ADMIN — ACETAMINOPHEN 975 MG: 325 TABLET, FILM COATED ORAL at 04:57

## 2024-08-13 RX ADMIN — OXYCODONE HYDROCHLORIDE 5 MG: 5 TABLET ORAL at 17:19

## 2024-08-13 RX ADMIN — ATORVASTATIN CALCIUM 40 MG: 40 TABLET, FILM COATED ORAL at 17:19

## 2024-08-13 RX ADMIN — RIVAROXABAN 20 MG: 20 TABLET, FILM COATED ORAL at 17:19

## 2024-08-13 RX ADMIN — OXYCODONE HYDROCHLORIDE 5 MG: 5 TABLET ORAL at 11:02

## 2024-08-13 RX ADMIN — TOPIRAMATE 25 MG: 25 TABLET, FILM COATED ORAL at 17:19

## 2024-08-13 RX ADMIN — TOPIRAMATE 25 MG: 25 TABLET, FILM COATED ORAL at 08:20

## 2024-08-13 RX ADMIN — DULOXETINE HYDROCHLORIDE 60 MG: 60 CAPSULE, DELAYED RELEASE ORAL at 08:20

## 2024-08-13 NOTE — ASSESSMENT & PLAN NOTE
Lab Results   Component Value Date    HGBA1C 11.1 (H) 08/12/2024    HGBA1C 10.7 (H) 08/12/2024       Recent Labs     08/12/24  1606 08/12/24  2101 08/13/24  0741 08/13/24  1114   POCGLU 189* 112 230* 125         Blood Sugar Average: Last 72 hrs:  (P) [id number]  Very poorly controlled. Only intermittently takes her meds at home   Again, am reluctant to make changes at this time due to potential for hypoglycemia if she overdoses her insulin   Endocrine referral placed  Discussed risk of further amputation/dialysis if she continues to be non-compliant with diabetes

## 2024-08-13 NOTE — CASE MANAGEMENT
Case Management Assessment    Patient name Tony Roberto  Location S /S -01 MRN [id number]  : 1985 Date 2024       Current Admission Date: 2024  Current Admission Diagnosis:Peripheral arterial disease (HCC)   Patient Active Problem List    Diagnosis Date Noted Date Diagnosed    Tobacco abuse 2024     Iron deficiency anemia 2023     Urinary incontinence 2023     Constipation 2023     Status post fall 2023     Pyuria 2023     Postoperative blood loss anemia 2023     Leukocytosis 2023     Class 3 severe obesity due to excess calories without serious comorbidity with body mass index (BMI) of 45.0 to 49.9 in adult ([facility]) 2022     Positive D dimer 10/02/2021     S/P AKA (above knee amputation) unilateral, left ([facility]) 2021     Bursitis of left knee 2021     Superficial femoral artery occlusion ([facility]) 2021    Morbid obesity 2021     Peripheral arterial disease ([facility]) 2021    Diarrhea 2021     Bilateral leg pain 2021     Leg pain 2020     Paresthesia 2020     COPD (chronic obstructive pulmonary disease)/asthma 2020     Nicotine dependence 2020     Syncope 2020    Hypertensive urgency 2020    Hydradenitis 2019    Chronic bilateral low back pain without sciatica 2018    Bipolar disorder 2018     Cocaine use 10/24/2018 2023    Mixed hyperlipidemia 10/24/2018 2023    Diplopia 10/24/2018 2023    Hypertension 10/22/2018     Obstructive sleep apnea 2018    Trichomoniasis 2018    Type 2 diabetes mellitus with diabetic polyneuropathy, with long-term current use of insulin ([facility]) 2018    Suicide attempt ([facility]) 2018    Uncontrolled type 2 diabetes mellitus with diabetic polyneuropathy, without long-term  current use of insulin 07/18/2018 08/25/2023    Moderate persistent asthma without complication 04/10/2018 08/25/2023    Diabetic autonomic neuropathy associated with type 2 diabetes mellitus (HCC) 04/10/2018 08/25/2023      LOS (days): 2  Geometric Mean LOS (GMLOS) (days): 3.1  Days to GMLOS:1.2     OBJECTIVE:    Risk of Unplanned Readmission Score: 19.05         Current admission status: Inpatient       Preferred Pharmacy:   [facility], [facility] [place] - [address]  [address] [facility] [place] [address]  Phone: [phone] Fax: [phone]    [facility] [place] - [address]  [address]  [place] [address]  Phone: [phone] Fax: [phone]    [facility] [place] - [address]  [address]  [place] [address]  Phone: [phone] Fax: [phone]    Primary Care Provider: Mati Johnson MD    Primary Insurance: [employer]  Secondary Insurance:     ASSESSMENT:  Active Health Care Proxies    There are no active Health Care Proxies on file.                      Patient Information  Admitted from:: Home  Mental Status: Alert  During Assessment patient was accompanied by: Not accompanied during assessment  Assessment information provided by:: Patient  Primary Caregiver: Self  Support Systems: Family members  County of Residence: [place]  What city do you live in?: [place]  Home entry access options. Select all that apply.: No steps to enter home  Type of Current Residence: Apartment  Floor Level: 1  Upon entering residence, is there a bedroom on the main floor (no further steps)?: Yes  Upon entering residence, is there a bathroom on the main floor (no further steps)?: Yes  Living Arrangements: Lives w/ Spouse/significant other    Activities of Daily Living Prior to Admission  Functional Status: Independent  Completes ADLs independently?: Yes  Ambulates independently?: Yes  Does patient use assisted  devices?: Yes  Assisted Devices (DME) used: Wheelchair  Does patient currently own DME?: Yes  What DME does the patient currently own?: Wheelchair  Does patient have a history of Outpatient Therapy (PT/OT)?: Yes  Does the patient have a history of Short-Term Rehab?: Yes ([facility])  Does patient have a history of HHC?: Yes  Does patient currently have HHC?: No         Patient Information Continued  Does patient have prescription coverage?: Yes  Does patient receive dialysis treatments?: No         Means of Transportation  Means of Transport to Appts:: Other (Comment) (insurance covers transports)    CM met with patient re: assessment. Patient relayed she lives with her wife in apartment on first level, no DOROTHY. Pt relayed she primarily utilizes her WC to ambulate and has an appointment coming up to work on getting an electric WC. Pt relayed she is independent with ADLS (has walk-in shower) and has hx of OPPT/HHC/STR. Pt relayed her insurance coverage provides transport to appointments. Pt confirmed PCP Aki and preferred pharmacy [facility]. No CM d/c needs currently identified, CM remains available.     Social Determinants of Health (SDOH)      Flowsheet Row Most Recent Value   Housing Stability    In the last 12 months, was there a time when you were not able to pay the mortgage or rent on time? N   At any time in the past 12 months, were you homeless or living in a shelter (including now)? N   Transportation Needs    In the past 12 months, has lack of transportation kept you from medical appointments or from getting medications? no   In the past 12 months, has lack of transportation kept you from meetings, work, or from getting things needed for daily living? No   Food Insecurity    Within the past 12 months, you worried that your food would run out before you got the money to buy more. Never true   Within the past 12 months, the food you bought just didn't last and you didn't have money to get  more. Never true   Utilities    In the past 12 months has the electric, gas, oil, or water company threatened to shut off services in your home? No

## 2024-08-13 NOTE — CASE MANAGEMENT
Case Management Discharge Planning Note    Patient name Tony Roberto  Location S /S -01 MRN [id number]  : 1985 Date 2024       Current Admission Date: 2024  Current Admission Diagnosis:Peripheral arterial disease (HCC)   Patient Active Problem List    Diagnosis Date Noted Date Diagnosed    Tobacco abuse 2024     Iron deficiency anemia 2023     Urinary incontinence 2023     Constipation 2023     Status post fall 2023     Pyuria 2023     Postoperative blood loss anemia 2023     Leukocytosis 2023     Class 3 severe obesity due to excess calories without serious comorbidity with body mass index (BMI) of 45.0 to 49.9 in adult ([facility]) 2022     Positive D dimer 10/02/2021     S/P AKA (above knee amputation) unilateral, left ([facility]) 2021     Bursitis of left knee 2021     Superficial femoral artery occlusion ([facility]) 2021    Morbid obesity 2021     Peripheral arterial disease ([facility]) 2021    Diarrhea 2021     Bilateral leg pain 2021     Leg pain 2020     Paresthesia 2020     COPD (chronic obstructive pulmonary disease)/asthma 2020     Nicotine dependence 2020     Syncope 2020    Hypertensive urgency 2020    Hydradenitis 2019    Chronic bilateral low back pain without sciatica 2018    Bipolar disorder 2018     Cocaine use 10/24/2018 2023    Mixed hyperlipidemia 10/24/2018 2023    Diplopia 10/24/2018 2023    Hypertension 10/22/2018     Obstructive sleep apnea 2018    Trichomoniasis 2018    Type 2 diabetes mellitus with diabetic polyneuropathy, with long-term current use of insulin ([facility]) 2018    Suicide attempt ([facility]) 2018 2023    Uncontrolled type 2 diabetes mellitus with diabetic polyneuropathy, without  long-term current use of insulin 07/18/2018 08/25/2023    Moderate persistent asthma without complication 04/10/2018 08/25/2023    Diabetic autonomic neuropathy associated with type 2 diabetes mellitus (HCC) 04/10/2018 08/25/2023      LOS (days): 2  Geometric Mean LOS (GMLOS) (days): 3.1  Days to GMLOS:1.2     OBJECTIVE:  Risk of Unplanned Readmission Score: 19.05         Current admission status: Inpatient   Preferred Pharmacy:   [facility], [facility] [place] - [address]  [address] [facility] [place] [address]  Phone: [phone] Fax: [phone]    [facility] [place] - [address]  [address]  [place] [address]  Phone: [phone] Fax: [phone]    [facility] [place] - [address]  [address]  [place] [address]  Phone: [phone] Fax: [phone]    Primary Care Provider: Mati Johnson MD    Primary Insurance: [employer]  Secondary Insurance:     DISCHARGE DETAILS:                                                                                     IMM reviewed with patient, patient agrees with discharge determination.  IMM Given (Date):: 08/13/24  IMM Given to:: Patient

## 2024-08-13 NOTE — ASSESSMENT & PLAN NOTE
"Presents with severe right foot pain since 4 days  History of PAD s/p left AKA on ASA 81 mg daily, atorvastatin 40 mg daily and Xarelto 20 mg daily  CTA abdominal with runoff - \"Mild atherosclerotic irregularity of the left external iliac artery with estimated stenosis of less than 30%. Focal greater than 70% stenosis of the distal right superficial femoral artery at the level of the abductor canal for a distance of 1.5 cm. Additional 50 to 60% stenosis of the above-the-knee right popliteal artery 5 cm superior to the knee joint for a distance of 0.6 cm. Three-vessel runoff in the right calf with mild atherosclerotic irregularity of the proximal peroneal and posterior tibial arteries\"  LEADS - Right: \"Evidence of 50-75% stenosis at the distal superficial femoral and proximal popliteal artery. Diffuse tibioperoneal vessel disease observed. Ankle/Brachial index: 0.79 , which is in the moderate category. Prior: 0.86. Metatarsal pressure of 129  mmHg. Great toe pressure of 100 mmHg, within the healing range. PVR/ PPG tracings are  dampened.\"  I suspect that most of the patient's pain is neuropathic in nature secondary to uncontrolled diabetes   Stable for discharge   Opioids are inappropriate for neuropathic pain - these have been stopped   I was going to offer to change her Cymbalta to Gabapentin, however patient reports that she was \"taking 20-30 Gabapentin at one time for pain relief\". Not seeing any deleterious effects of gabapentin overdose at this time, however am reluctant to make adjustments after hearing of this misuse   Surgery evaluated the patient and recommended medical management and outpatient workup.  Can be resumed back to oral anticoagulant.  Hence we will discontinue heparin drip. She should follow up with Vascular Surgery and referral was placed   Continue medical management   If patient continues to present seeking opioids for neuropathic pain a high utilizer plan will be generated   "

## 2024-08-13 NOTE — CASE MANAGEMENT
Case Management Discharge Planning Note    Patient name Tony Roberto  Location S /S -01 MRN [id number]  : 1985 Date 2024       Current Admission Date: 2024  Current Admission Diagnosis:Peripheral arterial disease (HCC)   Patient Active Problem List    Diagnosis Date Noted Date Diagnosed    Tobacco abuse 2024     Iron deficiency anemia 2023     Urinary incontinence 2023     Constipation 2023     Status post fall 2023     Pyuria 2023     Postoperative blood loss anemia 2023     Leukocytosis 2023     Class 3 severe obesity due to excess calories without serious comorbidity with body mass index (BMI) of 45.0 to 49.9 in adult ([facility]) 2022     Positive D dimer 10/02/2021     S/P AKA (above knee amputation) unilateral, left ([facility]) 2021     Bursitis of left knee 2021     Superficial femoral artery occlusion ([facility]) 2021    Morbid obesity 2021     Peripheral arterial disease ([facility]) 2021    Diarrhea 2021     Bilateral leg pain 2021     Leg pain 2020     Paresthesia 2020     COPD (chronic obstructive pulmonary disease)/asthma 2020     Nicotine dependence 2020     Syncope 2020    Hypertensive urgency 2020    Hydradenitis 2019    Chronic bilateral low back pain without sciatica 2018    Bipolar disorder 2018     Cocaine use 10/24/2018 2023    Mixed hyperlipidemia 10/24/2018 2023    Diplopia 10/24/2018 2023    Hypertension 10/22/2018     Obstructive sleep apnea 2018    Trichomoniasis 2018    Type 2 diabetes mellitus with diabetic polyneuropathy, with long-term current use of insulin ([facility]) 2018    Suicide attempt ([facility]) 2018 2023    Uncontrolled type 2 diabetes mellitus with diabetic polyneuropathy, without  long-term current use of insulin 07/18/2018 08/25/2023    Moderate persistent asthma without complication 04/10/2018 08/25/2023    Diabetic autonomic neuropathy associated with type 2 diabetes mellitus (HCC) 04/10/2018 08/25/2023      LOS (days): 2  Geometric Mean LOS (GMLOS) (days): 3.1  Days to GMLOS:1     OBJECTIVE:  Risk of Unplanned Readmission Score: 18.53         Current admission status: Inpatient   Preferred Pharmacy:   [facility], [facility] [place] - [address]  [address] [place] [address]  Phone: [phone] Fax: [phone]    [facility] [place] - [address]  [address]  [place] [address]  Phone: [phone] Fax: [phone]    [facility] - [place], [place] - [address]  [address]  [place] [address]  Phone: [phone] Fax: [phone]    Primary Care Provider: Mati Johnson MD    Primary Insurance: [employer]  Secondary Insurance:     DISCHARGE DETAILS:                                          Other Referral/Resources/Interventions Provided:  Interventions: Transportation  Referral Comments: RN notified this CM of pt need for transport to home today. [facility]. All parties notified of same.

## 2024-08-13 NOTE — DISCHARGE SUMMARY
"[facility]  Discharge- Tony Roberto 1985, 38 y.o. female MRN: [id number]  Unit/Bed#: S -[facility] Encounter: [id number]  Primary Care Provider: Mati Johnson MD   Date and time admitted to hospital: 8/11/2024  7:38 AM    * Peripheral arterial disease (HCC)  Assessment & Plan  Presents with severe right foot pain since 4 days  History of PAD s/p left AKA on ASA 81 mg daily, atorvastatin 40 mg daily and Xarelto 20 mg daily  CTA abdominal with runoff - \"Mild atherosclerotic irregularity of the left external iliac artery with estimated stenosis of less than 30%. Focal greater than 70% stenosis of the distal right superficial femoral artery at the level of the abductor canal for a distance of 1.5 cm. Additional 50 to 60% stenosis of the above-the-knee right popliteal artery 5 cm superior to the knee joint for a distance of 0.6 cm. Three-vessel runoff in the right calf with mild atherosclerotic irregularity of the proximal peroneal and posterior tibial arteries\"  LEADS - Right: \"Evidence of 50-75% stenosis at the distal superficial femoral and proximal popliteal artery. Diffuse tibioperoneal vessel disease observed. Ankle/Brachial index: 0.79 , which is in the moderate category. Prior: 0.86. Metatarsal pressure of 129  mmHg. Great toe pressure of 100 mmHg, within the healing range. PVR/ PPG tracings are  dampened.\"  I suspect that most of the patient's pain is neuropathic in nature secondary to uncontrolled diabetes   Stable for discharge   Opioids are inappropriate for neuropathic pain - these have been stopped   I was going to offer to change her Cymbalta to Gabapentin, however patient reports that she was \"taking 20-30 Gabapentin at one time for pain relief\". Not seeing any deleterious effects of gabapentin overdose at this time, however am reluctant to make adjustments after hearing of this misuse   Surgery evaluated the patient and recommended medical management and outpatient " workup.  Can be resumed back to oral anticoagulant.  Hence we will discontinue heparin drip. She should follow up with Vascular Surgery and referral was placed   Continue medical management   If patient continues to present seeking opioids for neuropathic pain a high utilizer plan will be generated     Type 2 diabetes mellitus with diabetic polyneuropathy, with long-term current use of insulin ([facility])  Assessment & Plan  Lab Results   Component Value Date    HGBA1C 11.1 (H) 08/12/2024    HGBA1C 10.7 (H) 08/12/2024       Recent Labs     08/12/24  1606 08/12/24  2101 08/13/24  0741 08/13/24  1114   POCGLU 189* 112 230* 125         Blood Sugar Average: Last 72 hrs:  (P) [id number]  Very poorly controlled. Only intermittently takes her meds at home   Again, am reluctant to make changes at this time due to potential for hypoglycemia if she overdoses her insulin   Endocrine referral placed  Discussed risk of further amputation/dialysis if she continues to be non-compliant with diabetes       S/P AKA (above knee amputation) unilateral, left ([facility])  Assessment & Plan  Stable   Supportive care    Class 3 severe obesity due to excess calories without serious comorbidity with body mass index (BMI) of 45.0 to 49.9 in adult ([facility])  Assessment & Plan  Affects all aspects of care  Lifestyle modification  Given L AKA exercise will be difficult       Medical Problems       Resolved Problems  Date Reviewed: 8/13/2024   None       Discharging Physician / Practitioner: Baljinder Juarez PA-C  PCP: Mati Johnson MD  Admission Date:   Admission Orders (From admission, onward)       Ordered        08/11/24 1211  INPATIENT ADMISSION  Once                          Discharge Date: 08/13/24    Consultations During Hospital Stay:  Vascular Surgery     Procedures Performed:   CXR  LEADs  CTA Abdominal w/Runoff     Significant Findings / Test Results:    CXR: No acute pulmonary disease   LEADs: RLL - Evidence of 50-75% stenosis at  the distal superficial femoral and proximal popliteal artery. Diffuse tibioperoneal vessel disease observed. NIKKI 0.79 in moderate category. Great toe pressure in healing range. LLL - AKA  CTA Abdominal with Runoff: No hemodynamically significant in flow arterial stenosis in the abdomen or pelvis. Mild atherosclerotic irregularity of the left external iliac artery with estimated stenosis of less than 30%. Focal greater than 70% stenosis of the distal right superficial femoral artery at the level of the abductor canal for a distance of 1.5 cm. Additional 50-60% stenosis of the above the knee right popliteal artery 5 cm superior to the knee joint for a distance of 0.6 cm. Three-vessel runoff in the right calf with mild atherosclerotic irregularity of the proximal peroneal and posterior tibial arteries. Occlusion of the left superficial femoral artery throughout its course in the left thigh. Left superficial femoral artery contains an occluded long segment stent. No distal arterial reconstitution. Left leg AKA. Extensive tree-in-bud nodularity of the lung bases that is nonspecific may represent a follicular bronchiolitis.      Incidental Findings:   None     Test Results Pending at Discharge (will require follow up):   None     Outpatient Tests Requested:  None    Complications:  None    Reason for Admission: Right Leg Pain    Hospital Course:   Tony Roberto is a 38 y.o. female patient who originally presented to the hospital on 8/11/2024 due to right leg pain. There was concern for vascular compromise given history. She was admitted for pain control and vascular surgery was consulted. LEADs were done and CTA was done. Vascular Surgery felt that there was no interventions needed and her pain was more representative of neuropathic pain. Opioids were stopped. Patient has misused Gabapentin in the past per her account - see above. Therefore, no adjustment to pain medications were made and she was advised to speak  with her PCP. She was referred to Endocrine for poorly controlled diabetes (she also admitted to not taking her home regimen 100% of the time) and vascular surgery for further management of PAD.             Please see above list of diagnoses and related plan for additional information.     Condition at Discharge: stable    Discharge Day Visit / Exam:   Subjective:  Patient still reporting extreme pain that feels like hit needles stabbing her legs. She was resting comfortable prior to my entrance to the room.   Vitals: Blood Pressure: 140/86 (08/13/24 0717)  Pulse: 69 (08/13/24 0717)  Temperature: 97.8 °F (36.6 °C) (08/13/24 0717)  Temp Source: Oral (08/12/24 1522)  Respirations: 18 (08/13/24 0717)  Weight - Scale: 114 kg (251 lb 5.2 oz) (08/11/24 2034)  SpO2: 99 % (08/13/24 0717)  Exam:   Physical Exam  Constitutional:       General: She is not in acute distress.     Appearance: Normal appearance. She is morbidly obese. She is not ill-appearing or diaphoretic.   HENT:      Head: Normocephalic and atraumatic.      Mouth/Throat:      Mouth: Mucous membranes are moist.   Eyes:      General: No scleral icterus.     Pupils: Pupils are equal, round, and reactive to light.   Cardiovascular:      Rate and Rhythm: Normal rate and regular rhythm.      Pulses: Normal pulses.           Dorsalis pedis pulses are 2+ on the right side.      Heart sounds: Normal heart sounds, S1 normal and S2 normal. No murmur heard.     No systolic murmur is present.      No diastolic murmur is present.      No gallop. No S3 or S4 sounds.   Pulmonary:      Effort: Pulmonary effort is normal. No accessory muscle usage or respiratory distress.      Breath sounds: Normal breath sounds. No stridor. No decreased breath sounds, wheezing, rhonchi or rales.   Chest:      Chest wall: No tenderness.   Abdominal:      General: Bowel sounds are normal. There is no distension.      Palpations: Abdomen is soft.      Tenderness: There is no abdominal tenderness.  There is no guarding.   Musculoskeletal:      Right lower leg: No edema.      Left lower leg: No edema.   Skin:     General: Skin is warm and dry.      Coloration: Skin is not jaundiced.   Neurological:      General: No focal deficit present.      Mental Status: She is alert. Mental status is at baseline.      Motor: No tremor or seizure activity.   Psychiatric:         Behavior: Behavior is cooperative.          Discussion with Family: Patient declined call to .     Discharge instructions/Information to patient and family:   See after visit summary for information provided to patient and family.      Provisions for Follow-Up Care:  See after visit summary for information related to follow-up care and any pertinent home health orders.      Mobility at time of Discharge:   Basic Mobility Inpatient Raw Score: 13  -HL Goal: 4: Move to chair/commode  JH-HLM Achieved: 3: Sit at edge of bed  HLM Goal NOT achieved. Continue to encourage mobility in post discharge setting.     Disposition:   Home    Planned Readmission: None     Discharge Statement:  I spent 45 minutes discharging the patient. This time was spent on the day of discharge. I had direct contact with the patient on the day of discharge. Greater than 50% of the total time was spent examining patient, answering all patient questions, arranging and discussing plan of care with patient as well as directly providing post-discharge instructions.  Additional time then spent on discharge activities.    Discharge Medications:  See after visit summary for reconciled discharge medications provided to patient and/or family.      **Please Note: This note may have been constructed using a voice recognition system**

## 2024-08-14 ENCOUNTER — TELEMEDICINE (OUTPATIENT)
Dept: NEUROLOGY | Facility: CLINIC | Age: 39
End: 2024-08-14
Payer: COMMERCIAL

## 2024-08-14 DIAGNOSIS — Z89.612 S/P AKA (ABOVE KNEE AMPUTATION) UNILATERAL, LEFT (HCC): Primary | ICD-10-CM

## 2024-08-14 PROCEDURE — 99213 OFFICE O/P EST LOW 20 MIN: CPT | Performed by: PHYSICAL MEDICINE & REHABILITATION

## 2024-08-14 NOTE — PATIENT INSTRUCTIONS
Still having issues with her wheelchair which is broken - call [facility] to inquire on how to get this evaluated and fixed    Currently on HOLD from outpatient PT due to right leg pain and vascular appointment coming up.      May be a candidate for future home therapies - she is fearful she will need to pay for those visits - will ask SW to research if this is true or can be covered    [employer] needed for limb edema control - prescripton provided and [facility] to deliver to her home    Follow-up in 5-6 weeks virtually with Dr. Augustine

## 2024-08-14 NOTE — PROGRESS NOTES
Physical Medicine & Rehabilitation Progress Note  [facility]    Virtual Regular Visit    Verification of patient location:    Patient is located at Home in the following state in which I hold an active license [place]      Assessment/Plan:    L AKA:  Currently on HOLD from outpatient PT due to right leg pain and vascular appointment coming up.  She would like to get evaluated by vascular.    Prosthetic training on HOLD  Resume when able.   May be a candidate for future home therapies as she has difficulty with transportation and therefore compliance - she is fearful she will need to pay for home care visits  New Shrinkers needed for limb edema control - prescripton provided and [employer] to deliver to her home    DME:  Still having issues with her wheelchair which is broken - call [employer] to inquire on how to get this evaluated and fixed    Follow-up in 5-6 weeks virtually with Dr. Augustine       Problem List Items Addressed This Visit          Care Coordination    S/P AKA (above knee amputation) unilateral, left (HCC) - Primary    Relevant Orders    Limb          Encounter provider Fabienne Augustine MD    Provider located at [address] [place] [address] [facility] [address] [id number]      Recent Visits  Date Type Provider Dept   08/14/24 Telemedicine Fabienne Augustine MD [facility]   Showing recent visits within past 7 days and meeting all other requirements  Future Appointments  No visits were found meeting these conditions.  Showing future appointments within next 150 days and meeting all other requirements       The patient was identified by name and date of birth. Tony Roberto was informed that this is a telemedicine visit and that the visit is being conducted through the Epic Embedded platform. She agrees to proceed..  My office door was closed. No one else was in the room.  She acknowledged consent and understanding of privacy and security  of the video platform. The patient has agreed to participate and understands they can discontinue the visit at any time.    Patient is aware this is a billable service.     Subjective  Tony Roberto is a 38 y.o. female s/p left transfemoral amputation (L AKA).      Patient is s/p left transfemoral amputation (L AKA) on 3/29/23 by Dr. Langford (Vascular) secondary to complications related to PAD    Patient is working with [employer].  She received a K2 level prosthesis and has gone to a few outpatient PT sessions for prosthetic training.      Unfortunately, she has developed more right lower extremity sensitivity and pain.      She has an upcoming vascular appointment.    Due to this she is on HOLD from PT and she is not wearing her L sided prosthesis as she is unable to walk properly.  States her right foot is in a lot of pain and limits her walking.     Getting evaluated for motorized scooter tomorrow.  Letter was provided in the past.    Wheelchair not yet fixed     Reports her residual limb is more swollen as she is not using her prosthesis now - In need of new shrinkers - will provide via [email or web address].  She is hopeful they can deliver to her home.     Not opposed to home care in the future but fearful she has to pay for this.     Incision: well healed  Residual limb pain: none  Phantom limb sensations or pain: Yes occasionally has sensations  Falls: None  Health of contralateral limb: See above, having pain in right foot.     Function:  Prior level of function: Independent without AD in mobility or self care  Current level of function: Mod I at a wheelchair level with mobility, transfers, self care including dressing, bathing with shower chair, and toileting. She is NOT using her prosthesis due to right foot issues  Goals: To Walk!  Motivation/family support:  Has strong desire and motivation to ambulate with a prosthesis    Medically stable.      Mood/Cognition: Fair, but frustrated    Social  "history expanded:  Lives in [place] with her wife in a handicapped 1st floor apartment.    Not currently working      Past Medical History:   Diagnosis Date    Asthma     Atherosclerosis     Bipolar 1 disorder (HCC)     COPD (chronic obstructive pulmonary disease) (HCC)     Depression     Diabetes mellitus (HCC)     Hypertension     Psychiatric disorder     cutting history    PTSD (post-traumatic stress disorder)     Schizoaffective disorder (HCC)     Tendonitis        Past Surgical History:   Procedure Laterality Date    AMPUTATION ABOVE KNEE (AKA) Left 3/29/2023    Procedure: AMPUTATION ABOVE KNEE (AKA);  Surgeon: Savi Langford MD;  [facility];  Service: Vascular    BYPASS FEMORAL-POPLITEAL Left 2021    Procedure: Left Common Femoral Below Knee to Popliteal Bypass with Insitu GSV graft.  Left Lower Extremity Angiogram;  Surgeon: Savi Langford MD;  Location[facility];  Service: Vascular     SECTION      EAR SURGERY      FL LUMBAR PUNCTURE DIAGNOSTIC  10/25/2018    IR LOWER EXTREMITY ANGIOGRAM  2021    IR LOWER EXTREMITY ANGIOGRAM  2021    MN SLCTV CATHJ 3RD+ ORD SLCTV ABDL PEL/LXTR BRNCH Left 3/24/2023    Procedure: Left leg angiogram;  Surgeon: Byron Wahl DO;  [facility];  Service: Vascular    TUBAL LIGATION         Current Outpatient Medications   Medication Sig Dispense Refill    Advair -21 MCG/ACT inhaler Inhale 2 puffs 2 (two) times a day Rinse mouth after use 12 g 1    albuterol (PROVENTIL HFA,VENTOLIN HFA) 90 mcg/act inhaler INHALE 2 PUFFS EVERY 4 HOURS AS NEEDED FOR WHEEZING OR SHORTNESS OF BREATH 8.5 g 5    Alcohol Swabs (Pharmacist Choice Alcohol) PADS USE 3-4 TIMES AS DIRECTED. 100 each 3    aspirin 81 mg chewable tablet CHEW 1 TABLET (81 MG TOTAL) DAILY 30 tablet 2    atorvastatin (LIPITOR) 40 mg tablet TAKE 1 TABLET (40 MG TOTAL) BY MOUTH DAILY WITH DINNER 30 tablet 5    B-D INS SYR MICROFINE .5CC/28G 28G X 1/2\" 0.5 ML MISC USE AS DIRECTED 100 each " 5    Blood Glucose Monitoring Suppl (ONE TOUCH ULTRA 2) w/Device KIT BY DEVICE ROUTE 2 (TWO) TIMES A DAY CHECK BLOOD SUGARS AND RECORD VALUE AND TIME OF DAY TWICE A DAY 1 kit 2    Blood Pressure Monitoring (Blood Pressure Monitor Automat) KATLYN Use Daily as Directed (Patient not taking: Reported on 2/7/2024) 1 each 0    Comfort EZ Pen Needles 32G X 4 MM MISC INJECT UNDER THE SKIN 4 (FOUR) TIMES A DAY (BEFORE MEALS AND AT BEDTIME) 120 each 5    Comfort EZ Pen Needles 33G X 4 MM MISC USE AS DIRECTED 300 each 0    dulaglutide (Trulicity) 0.75 MG/0.5ML injection Inject 0.5 mL (0.75 mg total) under the skin every 7 days 6 mL 1    DULoxetine (CYMBALTA) 30 mg delayed release capsule TAKE 2 CAPSULES (60 MG TOTAL) BY MOUTH DAILY 60 capsule 0    Global Inject Ease Lancets 30G MISC USE 3 (THREE) TIMES A  each 0    glycerin-hypromellose- (ARTIFICIAL TEARS) 0.2-0.2-1 % SOLN Administer 2 drops to both eyes every 6 (six) hours as needed (for eye discomfort) 30 mL 1    hydrOXYzine HCL (ATARAX) 50 mg tablet TAKE 1 TABLET (50 MG TOTAL) BY MOUTH DAILY AT BEDTIME AS NEEDED FOR ITCHING OR ANXIETY 30 tablet 0    insulin lispro (HumALOG/ADMELOG) 100 units/mL injection INJECT 4-16 UNITS UNDER THE SKIN 3 (THREE) TIMES A DAY BEFORE MEALS 10 mL 6    ketotifen (ZADITOR) 0.025 % ophthalmic solution Administer 1 drop to both eyes 2 (two) times a day as needed (for eye discomfort) 5 mL 0    Lantus 100 UNIT/ML subcutaneous injection INJECT 30 UNITS UNDER THE SKIN EVERY 12 (TWELVE) HOURS 10 mL 6    lisinopril (ZESTRIL) 20 mg tablet TAKE 1 TABLET (20 MG TOTAL) BY MOUTH DAILY 100 tablet 1    metFORMIN (GLUCOPHAGE) 1000 MG tablet TAKE ONE (1) TABLET BY MOUTH TWICE DAILY WITH FOOD 60 tablet 0    nicotine (NICODERM CQ) 21 mg/24 hr TD 24 hr patch Place 1 patch on the skin over 24 hours daily 28 patch 3    OneTouch Ultra test strip Use 1 each daily as needed (before meals) Use as instructed 200 each 2    prazosin (MINIPRESS) 1 mg capsule TAKE  "ONE (1) CAPSULE ONCE A DAY AT BEDTIME FOR HYPERTENSION 90 capsule 1    Sure Comfort Insulin Syringe 31G X 5/16\" 0.3 ML MISC 2 (two) times a day Use as directed      topiramate (TOPAMAX) 25 mg tablet TAKE 1 TABLET (25 MG TOTAL) BY MOUTH 2 (TWO) TIMES A DAY 60 tablet 1    Trulicity 1.5 MG/0.5ML injection AS DIRECTED WEEKLY 2 mL 2    Xarelto 20 MG tablet TAKE 1 TABLET (20 MG TOTAL) BY MOUTH DAILY WITH BREAKFAST 30 tablet 3     No current facility-administered medications for this visit.        No Known Allergies    Review of Systems   Constitutional: Negative.    HENT: Negative.     Eyes: Negative.    Respiratory: Negative.     Cardiovascular: Negative.    Gastrointestinal: Negative.    Endocrine: Negative.    Genitourinary: Negative.    Musculoskeletal: Negative.    Skin: Negative.    Allergic/Immunologic: Negative.    Neurological: Negative.    Hematological: Negative.    Psychiatric/Behavioral: Negative.         Video Exam  Wt Readings from Last 3 Encounters:   08/11/24 114 kg (251 lb 5.2 oz)   06/18/24 126 kg (277 lb)   01/09/24 123 kg (271 lb)        Physical Exam  Vitals and nursing note reviewed.   Constitutional:       General: She is not in acute distress.     Appearance: She is well-developed.   HENT:      Head: Normocephalic.      Nose: Nose normal.   Eyes:      Conjunctiva/sclera: Conjunctivae normal.   Pulmonary:      Effort: Pulmonary effort is normal.      Breath sounds: No wheezing.   Abdominal:      General: There is no distension.      Palpations: Abdomen is soft.   Musculoskeletal:      Cervical back: Neck supple.   Skin:     General: Skin is warm.   Neurological:      Mental Status: She is alert and oriented to person, place, and time.   Psychiatric:         Mood and Affect: Mood normal.          Visit Time  I have spent a total time of 30 minutes in caring for this patient on the day of the visit/encounter including Patient and family education, Impressions, Documenting in the medical record, and " Obtaining or reviewing history  .

## 2024-08-14 NOTE — UTILIZATION REVIEW
Continued Stay Review    SEE INITIAL REVIEW AT BOTTOM    Date: 8/13/24    Day 3: Has surpassed a 2nd midnight with active treatments and services.  Discharge Summary     Hospital Course:   Tony Roberto is a 38 y.o. female patient who originally presented to the hospital on 8/11/2024 due to right leg pain. There was concern for vascular compromise given history. She was admitted for pain control and vascular surgery was consulted. LEADs were done and CTA was done. Vascular Surgery felt that there was no interventions needed and her pain was more representative of neuropathic pain. Opioids were stopped. Patient has misused Gabapentin in the past per her account - see above. Therefore, no adjustment to pain medications were made and she was advised to speak with her PCP. She was referred to Endocrine for poorly controlled diabetes (she also admitted to not taking her home regimen 100% of the time) and vascular surgery for further management of PAD.     Disposition:  Home      Vital Signs (last 3 days) before discharge       Date/Time Temp Pulse Resp BP MAP (mmHg) SpO2 O2 Device Patient Position - Orthostatic VS [place] Coma Scale Score Pain    08/13/24 1719 -- -- -- -- -- -- -- -- -- 10 - Worst Possible Pain    08/13/24 1102 -- -- -- -- -- -- -- -- -- 8    08/13/24 0826 -- -- -- -- -- -- -- -- -- 9    08/13/24 07:17:58 97.8 °F (36.6 °C) 69 18 140/86 104 99 % -- -- -- --    08/13/24 0457 -- -- -- -- -- -- -- -- -- Med Not Given for Pain - for MAR use only    08/13/24 0312 -- -- -- -- -- -- -- -- -- 10 - Worst Possible Pain    08/12/24 2223 -- -- -- -- -- -- -- -- -- 10 - Worst Possible Pain    08/12/24 2104 -- -- -- -- -- -- -- -- -- 10 - Worst Possible Pain    08/12/24 21:00:44 98.1 °F (36.7 °C) 72 -- 141/64 90 99 % -- -- -- --    08/12/24 2000 -- -- -- -- -- -- -- -- 15 --    08/12/24 1835 -- -- -- -- -- -- -- -- -- 10 - Worst Possible Pain    08/12/24 16:41:10 98.2 °F (36.8 °C) 76 17 127/78 94 97 % -- -- -- --     08/12/24 1522 98 °F (36.7 °C) 84 18 138/67 93 99 % None (Room air) Lying -- --    08/12/24 1257 -- -- -- -- -- -- -- -- -- 10 - Worst Possible Pain    08/12/24 0957 -- -- -- -- -- -- -- -- -- 10 - Worst Possible Pain    08/12/24 0840 -- 74 16 -- -- 98 % -- -- -- 10 - Worst Possible Pain    08/12/24 0700 97.6 °F (36.4 °C) 72 18 112/62 82 99 % None (Room air) Lying -- --    08/12/24 0633 -- -- -- -- -- -- -- -- -- 10 - Worst Possible Pain    08/12/24 0555 -- -- -- -- -- -- -- -- -- 10 - Worst Possible Pain    08/12/24 0420 -- -- -- -- -- -- -- -- -- 10 - Worst Possible Pain    08/12/24 0217 -- -- -- -- -- -- -- -- -- 10 - Worst Possible Pain    08/11/24 2234 97.7 °F (36.5 °C) 91 18 135/75 97 99 % None (Room air) Lying -- --    08/11/24 2157 -- -- -- -- -- -- -- -- -- 6    08/11/24 2127 -- -- -- -- -- -- -- -- -- 10 - Worst Possible Pain    08/11/24 2126 -- -- -- 127/71 -- -- -- -- -- --    08/11/24 1917 -- -- -- -- -- -- -- -- 15 --    08/11/24 1900 -- -- -- -- -- -- -- -- -- 10 - Worst Possible Pain    08/11/24 1615 97.6 °F (36.4 °C) 72 17 129/74 96 94 % None (Room air) Lying -- --    08/11/24 1519 -- 84 18 165/80 113 -- -- -- -- 10 - Worst Possible Pain    08/11/24 1319 -- -- -- -- -- -- -- -- -- 6    08/11/24 1233 -- -- -- -- -- -- -- -- -- 10 - Worst Possible Pain    08/11/24 1130 -- -- -- -- -- -- -- -- -- 10 - Worst Possible Pain    08/11/24 1113 -- -- -- -- -- -- -- -- -- 10 - Worst Possible Pain    08/11/24 1040 -- -- -- -- -- -- -- -- -- 10 - Worst Possible Pain    08/11/24 1023 -- 70 -- 166/82 115 99 % -- -- -- --    08/11/24 1013 -- 77 -- 160/81 115 100 % -- -- -- --    08/11/24 1004 -- -- -- -- -- -- -- -- -- 10 - Worst Possible Pain    08/11/24 0926 -- -- -- -- -- -- -- -- -- 10 - Worst Possible Pain    08/11/24 0800 -- 81 18 155/79 110 100 % None (Room air) Lying -- --    08/11/24 0743 -- -- -- -- -- -- -- -- 15 --    08/11/24 0740 97.8 °F (36.6 °C) 83 18 165/87 -- 99 % None (Room air) Lying -- 10 -  Worst Possible Pain          Weight (last 2 days) before discharge       Date/Time Weight    08/11/24 2034 114 (251.32)    08/11/24 1050 114 (251.32)              Pertinent Labs/Diagnostic Results:   Radiology:  CTA abdominal w run off w wo contrast   Final Interpretation by Jordy Henley MD (08/11 1216)      No hemodynamically significant inflow arterial stenosis in the abdomen or pelvis.      Mild atherosclerotic irregularity of the left external iliac artery with estimated stenosis of less than 30%.      Focal greater than 70% stenosis of the distal right superficial femoral artery at the level of the abductor canal for a distance of 1.5 cm.      Additional 50 to 60% stenosis of the above-the-knee right popliteal artery 5 cm superior to the knee joint for a distance of 0.6 cm      Three-vessel runoff in the right calf with mild atherosclerotic irregularity of the proximal peroneal and posterior tibial arteries.      Occlusion of the left superficial femoral artery throughout its course in the left thigh. Left superficial femoral artery contains an occluded long segment stent. No distal arterial reconstitution. Left leg above-the-knee amputation      Extensive tree-in-bud nodularity of the lung bases that is nonspecific may represent a follicular bronchiolitis      The study was marked in EPIC for immediate notification..      Workstation performed: [id number]         VAS ARTERIAL DUPLEX-LOWER LIMB UNILATERAL   Final Interpretation by Savi Langford MD (08/11 5891)      XR chest 1 view portable   ED Interpretation by Nabeel Quiñonez MD (08/11 0902)   Per my independent interpretation:  no acute cardiopulmonary process       Final Interpretation by Glory Buckner MD (08/11 0840)      No acute cardiopulmonary disease.            Workstation performed: [id number]           Cardiology:  No orders to display     GI:  No orders to display           Results from last 7 days   Lab Units 08/13/24  5355  08/12/24  0517 08/11/24  0843   WBC Thousand/uL 8.16 8.08 9.63   HEMOGLOBIN g/dL 12.4 12.7 13.1   HEMATOCRIT % 35.8 36.5 37.0   PLATELETS Thousands/uL 375 359 398*   TOTAL NEUT ABS Thousands/µL 5.04 3.86 5.44         Results from last 7 days   Lab Units 08/13/24  0456 08/12/24  0517 08/11/24  0843   SODIUM mmol/L 135 133* 135   POTASSIUM mmol/L 3.8 4.1 3.4*   CHLORIDE mmol/L 104 103 104   CO2 mmol/L 25 23 24   ANION GAP mmol/L 6 7 7   BUN mg/dL 13 12 11   CREATININE mg/dL 0.44* 0.49* 0.43*   EGFR ml/min/1.73sq m 129 124 130   CALCIUM mg/dL 8.4 8.6 8.7   MAGNESIUM mg/dL  --  2.1 1.8*     Results from last 7 days   Lab Units 08/11/24  0843   AST U/L 8*   ALT U/L 15   ALK PHOS U/L 102   TOTAL PROTEIN g/dL 6.7   ALBUMIN g/dL 3.5   TOTAL BILIRUBIN mg/dL 0.33     Results from last 7 days   Lab Units 08/13/24  1721 08/13/24  1114 08/13/24  0741 08/12/24  2101 08/12/24  1606 08/12/24  1056 08/12/24  0726 08/11/24  2109 08/11/24  1649 08/11/24  1321   POC GLUCOSE mg/dl 143* 125 230* 112 189* 236* 254* 417* 344* 210*     Results from last 7 days   Lab Units 08/13/24  0456 08/12/24  0517 08/11/24  0843   GLUCOSE RANDOM mg/dL 132 280* 210*         Results from last 7 days   Lab Units 08/12/24  0517   HEMOGLOBIN A1C % 10.7*  11.1*   EAG mg/dl 260  272     BETA-HYDROXYBUTYRATE   Date Value Ref Range Status   08/24/2023 1.1 (H) <0.6 mmol/L Final   05/15/2019 0.3 <0.6 mmol/L Final                      Results from last 7 days   Lab Units 08/11/24  1241 08/11/24  1042 08/11/24  0843   HS TNI 0HR ng/L  --   --  4   HS TNI 2HR ng/L  --  3  --    HSTNI D2 ng/L  --  -1  --    HS TNI 4HR ng/L 4  --   --    HSTNI D4 ng/L 0  --   --          Results from last 7 days   Lab Units 08/12/24  0039 08/11/24  1716 08/11/24  1108 08/11/24  0843   PROTIME seconds  --   --   --  13.3   INR   --   --   --  0.94   PTT seconds 106* >210* 27 27                                                             Results from last 7 days   Lab Units  08/11/24  1722   CLARITY UA  Clear   COLOR UA  Light Yellow   SPEC GRAV UA  >=1.050*   PH UA  6.0   GLUCOSE UA mg/dl >=1000 (1%)*   KETONES UA mg/dl Negative   BLOOD UA  Negative   PROTEIN UA mg/dl Negative   NITRITE UA  Negative   BILIRUBIN UA  Negative   UROBILINOGEN UA (BE) mg/dl <2.0   LEUKOCYTES UA  Elevated glucose may cause decreased leukocyte values. See urine microscopic for UWBC result*   WBC UA /hpf None Seen   RBC UA /hpf 2-4*   BACTERIA UA /hpf Occasional   EPITHELIAL CELLS WET PREP /hpf Occasional                                                   Medications:   Scheduled Medications:  No current facility-administered medications for this encounter.    Continuous IV Infusions:  No current facility-administered medications for this encounter.    PRN Meds:  No current facility-administered medications for this encounter.      Discharge Plan: [facility] Utilization Review Department  ATTENTION: Please call with any questions or concerns to [phone] and carefully listen to the prompts so that you are directed to the right person. All voicemails are confidential.   For Discharge needs, contact Care Management DC Support Team at [phone] opt. [phone]  Send all requests for admission clinical reviews, approved or denied determinations and any other requests to dedicated fax number below belonging to the campus where the patient is receiving treatment. List of dedicated fax numbers for the Facilities:  FACILITY NAME UR FAX NUMBER   ADMISSION DENIALS (Administrative/Medical Necessity) [phone]   DISCHARGE SUPPORT TEAM (NETWORK) [phone]   PARENT CHILD HEALTH (Maternity/NICU/Pediatrics) [phone]   [facility] [phone]   [facility] [phone]   [facility] [phone]   [facility] [phone]   [facility] [phone]   [facility]  [phone]   [facility] [phone]   [facility] [phone]   [facility] [phone]   [facility] [phone]   [facility] [phone]   [facility] [phone]

## 2024-08-14 NOTE — UTILIZATION REVIEW
Initial Clinical Review    Admission: Date/Time/Statement:   Admission Orders (From admission, onward)       Ordered        08/11/24 1211  INPATIENT ADMISSION  Once                          Orders Placed This Encounter   Procedures    INPATIENT ADMISSION     Standing Status:   Standing     Number of Occurrences:   1     Order Specific Question:   Level of Care     Answer:   Med Surg [16]     Order Specific Question:   Estimated length of stay     Answer:   More than 2 Midnights     Order Specific Question:   Certification     Answer:   I certify that inpatient services are medically necessary for this patient for a duration of greater than two midnights. See H&P and MD Progress Notes for additional information about the patient's course of treatment.     ED Arrival Information       Expected   -    Arrival   8/11/2024 07:38    Acuity   Urgent              Means of arrival   Ambulance    Escorted by   [facility]   Hospitalist    Admission type   Emergency              Arrival complaint   flank pain             Chief Complaint   Patient presents with    Foot Pain     Patient to ED via EMS from home with c/o right foot pain for last 4 days. Patient has hx of DM and reports pain is burning/stabbing and it was the same as her left foot prior to her amputation.        Initial Presentation: 38 y.o. female  to ER From home via EMS.    PMH of type 2 diabetes, asthma, COPD, bipolar 1 disorder, hypertension, peripheral arterial disease who presents with severe pain over the sole of the right foot since 4 days which has been constant.   H/o PAD  left above-knee amputation.    8/11   to ER for evaluation of a 4-day history of gradual onset, atraumatic, constant, progressively worsening, burning/numbing in character right lower extremity pain.  Movement exacerbates her symptoms.  also reports polyuria  EXAM:  Rt DP palpable.   Tenderness (Distal RLE) present. RLE  paresthesias    LEAD 8/11/24 demonstrates  right distal SFA/pop 50-75% stenosis with NIKKI 0.79/JXB795/TYS310  CTA abd runoff reviewed demonstrated moderate distal right SFA stenosis and AK pop mild stenosis with 3 vessel runoff    8/11    PER VASCULAR (tele [facility])  CONSULT:   PAD with RLE paresthesias. No acute findings on CT and NIKKI/pressures not consistent with symptoms of rest pain/paresthesias. She is nonambulatory. Continue anticoagulation. OK to transition back to PO anticoagulation with outpatient follow-up from vascular standpoint.  Initiate IV HEPARIN GTT      Anticipated Length of Stay/Certification Statement: Patient will be admitted on an inpatient basis with an anticipated length of stay of greater than 2 midnights secondary to PAD.     Date: 8/12      Day 2:    Patient still complains of right leg pain.  However is lying in the bed and sleeping almost.  She mentioned that the pain is worse especially when she tries to stand up.  Noncompliant with diabetic diet. PER VASCULAR recommend medical management and outpatient workup.  Can be resumed back to oral anticoagulant.  Hence we will discontinue heparin drip.   Ct ASA, statin   Pain control -however suspect pain medication seeking behavior.  Will mention to hold pain medication patient is sleepy or drowsy.           ED Triage Vitals [08/11/24 0740]   Temperature Pulse Respirations Blood Pressure SpO2 Pain Score   97.8 °F (36.6 °C) 83 18 165/87 99 % 10 - Worst Possible Pain     Weight (last 2 days) before discharge       Date/Time Weight    08/11/24 2034 114 (251.32)    08/11/24 1050 114 (251.32)            Vital Signs (last 3 days)       Date/Time Temp Pulse Resp BP MAP (mmHg) SpO2 [place] Coma Scale Score Pain    08/12/24 1835 -- -- -- -- -- -- -- 10 - Worst Possible Pain    08/12/24 16:41:10 98.2 °F (36.8 °C) 76 17 127/78 94 97 % -- --    08/12/24 1522 98 °F (36.7 °C) 84 18 138/67 93 99 % -- --    08/12/24 1257 -- -- -- -- -- -- -- 10 - Worst Possible Pain    08/12/24 0700 97.6 °F (36.4 °C) 72 18  112/62 82 99 % -- --    08/12/24 0633 -- -- -- -- -- -- -- 10 - Worst Possible Pain    08/11/24 2234 97.7 °F (36.5 °C) 91 18 135/75 97 99 % -- --    08/11/24 2157 -- -- -- -- -- -- -- 6    08/11/24 2127 -- -- -- -- -- -- -- 10 - Worst Possible Pain    08/11/24 2126 -- -- -- 127/71 -- -- -- --    08/11/24 1917 -- -- -- -- -- -- 15 --    08/11/24 1900 -- -- -- -- -- -- -- 10 - Worst Possible Pain    08/11/24 1615 97.6 °F (36.4 °C) 72 17 129/74 96 94 % -- --    08/11/24 1519 -- 84 18 165/80 113 -- -- 10 - Worst Possible Pain    08/11/24 1319 -- -- -- -- -- -- -- 6    08/11/24 1023 -- 70 -- 166/82 115 99 % -- --    08/11/24 1013 -- 77 -- 160/81 115 100 % -- --    08/11/24 0926 -- -- -- -- -- -- -- 10 - Worst Possible Pain    08/11/24 0800 -- 81 18 155/79 110 100 % -- --    08/11/24 0743 -- -- -- -- -- -- 15 --    08/11/24 0740 97.8 °F (36.6 °C) 83 18 165/87 -- 99 % -- 10 - Worst Possible Pain              Pertinent Labs/Diagnostic Test Results:   Radiology:  CTA abdominal w run off w wo contrast   Final Interpretation by Jordy Henley MD (08/11 1216)      No hemodynamically significant inflow arterial stenosis in the abdomen or pelvis.      Mild atherosclerotic irregularity of the left external iliac artery with estimated stenosis of less than 30%.      Focal greater than 70% stenosis of the distal right superficial femoral artery at the level of the abductor canal for a distance of 1.5 cm.      Additional 50 to 60% stenosis of the above-the-knee right popliteal artery 5 cm superior to the knee joint for a distance of 0.6 cm      Three-vessel runoff in the right calf with mild atherosclerotic irregularity of the proximal peroneal and posterior tibial arteries.      Occlusion of the left superficial femoral artery throughout its course in the left thigh. Left superficial femoral artery contains an occluded long segment stent. No distal arterial reconstitution. Left leg above-the-knee amputation      Extensive  tree-in-bud nodularity of the lung bases that is nonspecific may represent a follicular bronchiolitis      The study was marked in EPIC for immediate notification..      Workstation performed: [id number]         VAS ARTERIAL DUPLEX-LOWER LIMB UNILATERAL   Final Interpretation by Savi Langford MD (08/11 1738)      XR chest 1 view portable   ED Interpretation by Nabeel Quiñonez MD (08/11 0902)   Per my independent interpretation:  no acute cardiopulmonary process       Final Interpretation by Glory Buckner MD (08/11 0840)      No acute cardiopulmonary disease.            Workstation performed: [id number]                 Results from last 7 days   Lab Units 08/13/24  0456 08/12/24  0517 08/11/24  0843   WBC Thousand/uL 8.16 8.08 9.63   HEMOGLOBIN g/dL 12.4 12.7 13.1   HEMATOCRIT % 35.8 36.5 37.0   PLATELETS Thousands/uL 375 359 398*   TOTAL NEUT ABS Thousands/µL 5.04 3.86 5.44         Results from last 7 days   Lab Units 08/13/24  0456 08/12/24  0517 08/11/24  0843   SODIUM mmol/L 135 133* 135   POTASSIUM mmol/L 3.8 4.1 3.4*   CHLORIDE mmol/L 104 103 104   CO2 mmol/L 25 23 24   ANION GAP mmol/L 6 7 7   BUN mg/dL 13 12 11   CREATININE mg/dL 0.44* 0.49* 0.43*   EGFR ml/min/1.73sq m 129 124 130   CALCIUM mg/dL 8.4 8.6 8.7   MAGNESIUM mg/dL  --  2.1 1.8*     Results from last 7 days   Lab Units 08/11/24  0843   AST U/L 8*   ALT U/L 15   ALK PHOS U/L 102   TOTAL PROTEIN g/dL 6.7   ALBUMIN g/dL 3.5   TOTAL BILIRUBIN mg/dL 0.33     Results from last 7 days   Lab Units 08/13/24  1721 08/13/24  1114 08/13/24  0741 08/12/24  2101 08/12/24  1606 08/12/24  1056 08/12/24  0726 08/11/24  2109 08/11/24  1649 08/11/24  1321   POC GLUCOSE mg/dl 143* 125 230* 112 189* 236* 254* 417* 344* 210*     Results from last 7 days   Lab Units 08/13/24  0456 08/12/24  0517 08/11/24  0843   GLUCOSE RANDOM mg/dL 132 280* 210*         Results from last 7 days   Lab Units 08/12/24  0517   HEMOGLOBIN A1C % 10.7*  11.1*   EAG mg/dl 260  272      BETA-HYDROXYBUTYRATE   Date Value Ref Range Status   08/24/2023 1.1 (H) <0.6 mmol/L Final   05/15/2019 0.3 <0.6 mmol/L Final      Results from last 7 days   Lab Units 08/11/24  1241 08/11/24  1042 08/11/24  0843   HS TNI 0HR ng/L  --   --  4   HS TNI 2HR ng/L  --  3  --    HSTNI D2 ng/L  --  -1  --    HS TNI 4HR ng/L 4  --   --    HSTNI D4 ng/L 0  --   --          Results from last 7 days   Lab Units 08/12/24  0039 08/11/24  1716 08/11/24  1108 08/11/24  0843   PROTIME seconds  --   --   --  13.3   INR   --   --   --  0.94   PTT seconds 106* >210* 27 27     Results from last 7 days   Lab Units 08/11/24  1722   CLARITY UA  Clear   COLOR UA  Light Yellow   SPEC GRAV UA  >=1.050*   PH UA  6.0   GLUCOSE UA mg/dl >=1000 (1%)*   KETONES UA mg/dl Negative   BLOOD UA  Negative   PROTEIN UA mg/dl Negative   NITRITE UA  Negative   BILIRUBIN UA  Negative   UROBILINOGEN UA (BE) mg/dl <2.0   LEUKOCYTES UA  Elevated glucose may cause decreased leukocyte values. See urine microscopic for UWBC result*   WBC UA /hpf None Seen   RBC UA /hpf 2-4*   BACTERIA UA /hpf Occasional   EPITHELIAL CELLS WET PREP /hpf Occasional       ED Treatment-Medication Administration from 08/11/2024 0738 to 08/11/2024 1429         Date/Time Order Dose Route Action     08/11/2024 0818 gabapentin (NEURONTIN) capsule 300 mg 300 mg Oral Given     08/11/2024 0818 lidocaine (LIDODERM) 5 % patch 1 patch 1 patch Topical Medication Applied     08/11/2024 0818 acetaminophen (TYLENOL) tablet 650 mg 650 mg Oral Given     08/11/2024 0926 fentaNYL injection 50 mcg 50 mcg Intravenous Given     08/11/2024 1040 fentaNYL injection 50 mcg 50 mcg Intravenous Given     08/11/2024 1136 heparin (porcine) 25,000 units in 0.45% NaCl 250 mL infusion (premix) 18 Units/kg/hr Intravenous New Bag     08/11/2024 1130 fentaNYL injection 50 mcg 50 mcg Intravenous Given     08/11/2024 1233 HYDROmorphone (DILAUDID) injection 0.5 mg 0.5 mg Intravenous Given     08/11/2024 1233 oxyCODONE  (ROXICODONE) IR tablet 5 mg 5 mg Oral Given     08/11/2024 1339 aspirin chewable tablet 81 mg 81 mg Oral Given     08/11/2024 1339 lisinopril (ZESTRIL) tablet 20 mg 20 mg Oral Given     08/11/2024 1338 nicotine (NICODERM CQ) 14 mg/24hr TD 24 hr patch 1 patch 1 patch Transdermal Medication Applied     08/11/2024 1339 acetaminophen (TYLENOL) tablet 975 mg 975 mg Oral Given     08/11/2024 1342 insulin lispro (HumALOG/ADMELOG) 100 units/mL subcutaneous injection 1-6 Units 2 Units Subcutaneous Given     08/11/2024 1342 insulin glargine (LANTUS) subcutaneous injection 30 Units 0.3 mL 30 Units Subcutaneous Given     08/11/2024 1339 potassium chloride (Klor-Con M20) CR tablet 20 mEq 20 mEq Oral Given            Past Medical History:   Diagnosis Date    Asthma     Atherosclerosis     Bipolar 1 disorder (HCC)     COPD (chronic obstructive pulmonary disease) ([facility])     Depression     Diabetes mellitus (HCC)     Hypertension     Psychiatric disorder     cutting history    PTSD (post-traumatic stress disorder)     Schizoaffective disorder ([facility])     Tendonitis      Present on Admission:   Peripheral arterial disease ([facility])      Admitting Diagnosis: Foot pain [M79.673]  Peripheral vascular disease ([facility]) [I73.9]  Elevated blood pressure reading [R03.0]  Acute leg pain, right [M79.604]  Age/Sex: 38 y.o. female    Admission Orders:   see above .    b/l le scd's ,  I/O q shift,  routine VS.  Accucks qid w/SSI.  Lo carb diet.      8/11  @  1933  -  Given 1x  IV Mg 2 gm     Scheduled Medications:  No current facility-administered medications for this encounter.    Continuous IV Infusions:  No current facility-administered medications for this encounter.    PRN Meds:  albuterol, 2 puff, Inhalation, Q4H PRN  HYDROmorphone, 0.5 mg, Intravenous, Q4H PRN  ... 8/11 x2 ....  8/12 x4   HYDROmorphone, 0.2 mg, Intravenous, Q4H PRN  ... 8/11 x2 ....  8/12 x3  hydrOXYzine HCL, 50 mg, Oral, HS PRN  ondansetron, 4 mg, Intravenous, Q6H PRN  oxyCODONE, 5  mg, Oral, Q4H PRN...  8/11 @ 1233  ... 8/12 @ 1000        IP CONSULT TO VASCULAR SURGERY    Network Utilization Review Department  ATTENTION: Please call with any questions or concerns to [phone] and carefully listen to the prompts so that you are directed to the right person. All voicemails are confidential.   For Discharge needs, contact Care Management DC Support Team at [phone] opt. [phone]  Send all requests for admission clinical reviews, approved or denied determinations and any other requests to dedicated fax number below belonging to the campus where the patient is receiving treatment. List of dedicated fax numbers for the Facilities:  FACILITY NAME UR FAX NUMBER   ADMISSION DENIALS (Administrative/Medical Necessity) [phone]   DISCHARGE SUPPORT TEAM (NETWORK) [phone]   PARENT CHILD HEALTH (Maternity/NICU/Pediatrics) [phone]   [facility] [phone]   [facility] [phone]   [facility] [phone]   [facility] [phone]   [facility] [phone]   [facility] [phone]   [facility] [phone]   [facility] [phone]   [facility] [phone]   [facility] [phone]   [facility] [phone]   [facility] [phone]

## 2024-08-19 ENCOUNTER — TRANSITIONAL CARE MANAGEMENT (OUTPATIENT)
Dept: FAMILY MEDICINE CLINIC | Facility: CLINIC | Age: 39
End: 2024-08-19

## 2024-08-19 ENCOUNTER — VBI (OUTPATIENT)
Dept: ADMINISTRATIVE | Facility: OTHER | Age: 39
End: 2024-08-19

## 2024-08-19 NOTE — TELEPHONE ENCOUNTER
08/19/24 9:59 AM     Chart reviewed for Hemoglobin A1c was/were submitted to the patient's insurance.     Michelle Howard   [facility]

## 2024-08-21 ENCOUNTER — TELEPHONE (OUTPATIENT)
Dept: NEUROLOGY | Facility: CLINIC | Age: 39
End: 2024-08-21

## 2024-09-04 ENCOUNTER — TELEPHONE (OUTPATIENT)
Age: 39
End: 2024-09-04

## 2024-09-04 NOTE — TELEPHONE ENCOUNTER
Caller: Tony    Doctor: Vish    Reason for call: Patient requesting Lyft to and from appointment. She has a wheel chair. Wife will be joining her and assisting her with wheel chair.    Apartment is in the back, patient states back entrance. If possible please list both cell phone numbers. [phone] and [phone]    Call back#: [phone]

## 2024-09-06 ENCOUNTER — TELEPHONE (OUTPATIENT)
Age: 39
End: 2024-09-06

## 2024-09-10 NOTE — PROGRESS NOTES
Assessment/Plan:      There are no diagnoses linked to this encounter.    Peripheral arterial disease (HCC)  38-year-old female presents for PAD.  She had a prior left SFA angioplasty and stent and then a subsequent femoropopliteal bypass that failed and had a left AKA done with our group 6 months ago.  Her amputation is healed well she is not ambulating on a prosthetic yet due to her body habitus she is here today for an RFN visit.  She has known PAD in her right lower extremity she did have a CT with runoff and a lower extremity arterial duplex both of the studies were reviewed by myself it demonstrates distal SFA severe stenosis with an NIKKI of 0.8.  She has no tissue loss she does have pain chronically in her right leg which is not consistent with ischemic rest pain she has poorly controlled diabetes as well as she is also still smoking.  I discussed her testing results with her and explained that there was no indication immediately for any further vascular intervention however she must make lifestyle changes and modify her behaviors to minimize her risk for subsequent right lower extremity limb loss she already has a referral out to endocrinology and I stressed her to keep this appointment I also stressed the importance of smoking cessation I will give her referral to the smoking cessation hotline.  I recommended that she continue her aspirin and statin we will schedule her for a lower extremity arterial duplex in 6 months and an office visit with a vascular DEVIKA in 1 year.      Subjective:     Patient ID: Tony Roberto is a 38 y.o. female.    Patient presents today for hospital follow up due to PAD. CTA abdomen and EZEKIEL done 8/11/24. Non-ambulatory secondary to L AKA 3/29/23. Intermittent R foot pain and burning, mostly at night.     HPI    Review of Systems   Constitutional: Negative.    HENT: Negative.     Eyes: Negative.    Respiratory:  Positive for cough and shortness of breath.    Cardiovascular:  Negative.    Gastrointestinal: Negative.    Endocrine: Negative.    Genitourinary: Negative.    Musculoskeletal:  Positive for gait problem.   Skin: Negative.    Allergic/Immunologic: Negative.    Hematological: Negative.    Psychiatric/Behavioral: Negative.         I have reviewed the ROS above and made changes as needed.      Objective:     Physical Exam      General  Exam: alert, awake, oriented, no distress, consistent with stated age    Integumentary  Exam: warm, dry, no gross lesions, no bruises and normal color    Head and Neck  Exam: supple, no bruits, trachea midline, no JVD, no mass or palpable nodes    Chest and Lung  Exam: chest normal without deformity, bilaterally expansive, clear to auscultation    Cardiovascular  Exam: regular rate, regular rhythm, no murmurs, no rubs or gallops    Adbomen  Exam: soft, non-tender, non-distended, no pulsatile abdominal masses, no abdominal bruit    Peripheral Vascular  Upper Extremity:  Palpation: Radial pulse- Bilateral 2+    Lower Extremity:  Palpation: Femoral pulse- Bilateral 1+          Healed left AKA   Right foot warm, 1+ right DP, no LE wounds  Neurologic  Exam:alert, non-focal, oriented x 3, cranial nerves II-XII grossly intact        Tobacco use is a significant patient-modifiable risk factor for this patient’s vascular disease with multiple vascular comorbidities, and a significant risk factor for failure of and complications from any endovascular or surgical interventions.    I explained to the patient the effects of smoking including peripheral artery disease, coronary artery disease, cerebrovascular disease as well as cancer and chronic obstructive pulmonary disease. I asked the patient to stop smoking immediately. It is never too late to quit, and many studies show significant health benefits as well as economical savings after smoking cessation. I offered to the patient nicotine replacement therapy as well as referral to the smoking cessation program  and access to the quit line 3-476-DVTHGZZ or ambulatory referral to our network smoking cessation program.    Based on our conversation, this patient does not appear ready to quit    And declined my offer of nicotine replacement or tobacco cessation medications    The patient did not set a quit date. I will continue to  follow up on this issue at our next scheduled visit.     I spent approximately 10 minutes on tobacco cessation counseling with this patient.

## 2024-09-11 ENCOUNTER — OFFICE VISIT (OUTPATIENT)
Dept: VASCULAR SURGERY | Facility: CLINIC | Age: 39
End: 2024-09-11
Payer: COMMERCIAL

## 2024-09-11 VITALS
DIASTOLIC BLOOD PRESSURE: 92 MMHG | SYSTOLIC BLOOD PRESSURE: 136 MMHG | HEART RATE: 84 BPM | HEIGHT: 61 IN | BODY MASS INDEX: 47.49 KG/M2

## 2024-09-11 DIAGNOSIS — I70.422: ICD-10-CM

## 2024-09-11 DIAGNOSIS — I73.9 PERIPHERAL VASCULAR DISEASE (HCC): ICD-10-CM

## 2024-09-11 DIAGNOSIS — I73.9 PERIPHERAL ARTERIAL DISEASE (HCC): Primary | ICD-10-CM

## 2024-09-11 PROCEDURE — 99205 OFFICE O/P NEW HI 60 MIN: CPT | Performed by: SURGERY

## 2024-09-11 NOTE — ASSESSMENT & PLAN NOTE
38-year-old female presents for PAD.  She had a prior left SFA angioplasty and stent and then a subsequent femoropopliteal bypass that failed and had a left AKA done with our group 6 months ago.  Her amputation is healed well she is not ambulating on a prosthetic yet due to her body habitus she is here today for an RFN visit.  She has known PAD in her right lower extremity she did have a CT with runoff and a lower extremity arterial duplex both of the studies were reviewed by myself it demonstrates distal SFA severe stenosis with an NIKKI of 0.8.  She has no tissue loss she does have pain chronically in her right leg which is not consistent with ischemic rest pain she has poorly controlled diabetes as well as she is also still smoking.  I discussed her testing results with her and explained that there was no indication immediately for any further vascular intervention however she must make lifestyle changes and modify her behaviors to minimize her risk for subsequent right lower extremity limb loss she already has a referral out to endocrinology and I stressed her to keep this appointment I also stressed the importance of smoking cessation I will give her referral to the smoking cessation hotline.  I recommended that she continue her aspirin and statin we will schedule her for a lower extremity arterial duplex in 6 months and an office visit with a vascular DEVIKA in 1 year.

## 2024-09-12 ENCOUNTER — TELEPHONE (OUTPATIENT)
Dept: FAMILY MEDICINE CLINIC | Facility: CLINIC | Age: 39
End: 2024-09-12

## 2024-09-12 DIAGNOSIS — I73.9 PAD (PERIPHERAL ARTERY DISEASE) (HCC): ICD-10-CM

## 2024-09-12 RX ORDER — ASPIRIN 81 MG/1
81 TABLET, CHEWABLE ORAL DAILY
Qty: 30 TABLET | Refills: 2 | Status: SHIPPED | OUTPATIENT
Start: 2024-09-12

## 2024-09-12 NOTE — TELEPHONE ENCOUNTER
Paperwork was completed by dr blair, MR placed the paperwork in the red folder to have an attending to sign.   Please when completed put in the folder at the .

## 2024-09-12 NOTE — TELEPHONE ENCOUNTER
Pt called requesting an Attending resign the [employer] paperwork. It was denied because a resident signed it. Please advise

## 2024-09-12 NOTE — TELEPHONE ENCOUNTER
Encounter for forms      Patient requires a form to be completed.  Patient is aware of 7-10 day turn around time.    Please refer to the following information:       Type of Form:  [employer] pw    Date of Visit (if applicable):     Doctor:    Expected date: timely manner     How patient would like to receive form: please fax when completed    Fax number: [phone]    Pw was placed in the red folder     Copy scanned to encounter. Copy provided to patient. Original in (X) team folder to be completed.

## 2024-09-12 NOTE — TELEPHONE ENCOUNTER
Pt called to advise that [employer] is faxing a new form to be signed by an attending physician.     NFA

## 2024-09-13 NOTE — TELEPHONE ENCOUNTER
Pt called because [employer] is still having an issue with the signature. Jojo from office is calling [facility] [employer] hotline to get it straightened out. Thank you

## 2024-09-16 ENCOUNTER — PATIENT OUTREACH (OUTPATIENT)
Dept: OTHER | Facility: CLINIC | Age: 39
End: 2024-09-16

## 2024-09-17 DIAGNOSIS — E11.42 TYPE 2 DIABETES MELLITUS WITH DIABETIC POLYNEUROPATHY, WITH LONG-TERM CURRENT USE OF INSULIN (HCC): ICD-10-CM

## 2024-09-17 DIAGNOSIS — Z79.4 TYPE 2 DIABETES MELLITUS WITH DIABETIC POLYNEUROPATHY, WITH LONG-TERM CURRENT USE OF INSULIN (HCC): ICD-10-CM

## 2024-09-17 DIAGNOSIS — F31.75 BIPOLAR DISORDER, IN PARTIAL REMISSION, MOST RECENT EPISODE DEPRESSED (HCC): ICD-10-CM

## 2024-09-18 RX ORDER — DULOXETIN HYDROCHLORIDE 30 MG/1
60 CAPSULE, DELAYED RELEASE ORAL DAILY
Qty: 60 CAPSULE | Refills: 5 | Status: SHIPPED | OUTPATIENT
Start: 2024-09-18

## 2024-09-18 RX ORDER — DULAGLUTIDE 1.5 MG/.5ML
INJECTION, SOLUTION SUBCUTANEOUS
Qty: 2 ML | Refills: 2 | Status: SHIPPED | OUTPATIENT
Start: 2024-09-18

## 2024-10-01 DIAGNOSIS — J44.9 CHRONIC OBSTRUCTIVE PULMONARY DISEASE, UNSPECIFIED COPD TYPE (HCC): ICD-10-CM

## 2024-10-01 RX ORDER — ALBUTEROL SULFATE 90 UG/1
INHALANT RESPIRATORY (INHALATION)
Qty: 8.5 G | Refills: 2 | Status: SHIPPED | OUTPATIENT
Start: 2024-10-01

## 2024-10-08 DIAGNOSIS — I10 HYPERTENSION: ICD-10-CM

## 2024-10-10 RX ORDER — PRAZOSIN HYDROCHLORIDE 1 MG/1
CAPSULE ORAL
Qty: 90 CAPSULE | Refills: 1 | Status: SHIPPED | OUTPATIENT
Start: 2024-10-10

## 2024-10-22 DIAGNOSIS — I73.9 PAD (PERIPHERAL ARTERY DISEASE) (HCC): ICD-10-CM

## 2024-10-23 RX ORDER — RIVAROXABAN 20 MG/1
TABLET, FILM COATED ORAL
Qty: 30 TABLET | Refills: 5 | Status: SHIPPED | OUTPATIENT
Start: 2024-10-23

## 2024-10-24 DIAGNOSIS — E11.42 TYPE 2 DIABETES MELLITUS WITH DIABETIC POLYNEUROPATHY, WITH LONG-TERM CURRENT USE OF INSULIN (HCC): ICD-10-CM

## 2024-10-24 DIAGNOSIS — Z79.4 TYPE 2 DIABETES MELLITUS WITH DIABETIC POLYNEUROPATHY, WITH LONG-TERM CURRENT USE OF INSULIN (HCC): ICD-10-CM

## 2024-10-24 RX ORDER — DULAGLUTIDE 1.5 MG/.5ML
INJECTION, SOLUTION SUBCUTANEOUS
Qty: 2 ML | Refills: 5 | Status: SHIPPED | OUTPATIENT
Start: 2024-10-24

## 2024-10-31 DIAGNOSIS — Z79.4 TYPE 2 DIABETES MELLITUS WITH DIABETIC POLYNEUROPATHY, WITH LONG-TERM CURRENT USE OF INSULIN (HCC): ICD-10-CM

## 2024-10-31 DIAGNOSIS — E11.42 TYPE 2 DIABETES MELLITUS WITH DIABETIC POLYNEUROPATHY, WITH LONG-TERM CURRENT USE OF INSULIN (HCC): ICD-10-CM

## 2024-10-31 RX ORDER — PEN NEEDLE, DIABETIC 32GX 5/32"
NEEDLE, DISPOSABLE MISCELLANEOUS
Qty: 100 EACH | Refills: 1 | Status: SHIPPED | OUTPATIENT
Start: 2024-10-31

## 2024-11-14 DIAGNOSIS — E11.9 DIABETES (HCC): ICD-10-CM

## 2024-12-02 ENCOUNTER — TELEPHONE (OUTPATIENT)
Age: 39
End: 2024-12-02

## 2024-12-02 NOTE — TELEPHONE ENCOUNTER
Pt called to schedule annual wellness appt on 12/5 at 1PM and needs a Lfyt to/from the appt. Please advise and contact pt back with the pickup information. Confirmed address on chart, [place].

## 2024-12-03 NOTE — TELEPHONE ENCOUNTER
Patient called and cancelled her appointment for 12/5, if a Lyft was scheduled, please cancel it. Patient will call back to r/s.    Thank you.

## 2024-12-10 ENCOUNTER — APPOINTMENT (EMERGENCY)
Dept: VASCULAR ULTRASOUND | Facility: HOSPITAL | Age: 39
End: 2024-12-10
Payer: COMMERCIAL

## 2024-12-10 ENCOUNTER — HOSPITAL ENCOUNTER (EMERGENCY)
Facility: HOSPITAL | Age: 39
Discharge: HOME | End: 2024-12-10
Attending: EMERGENCY MEDICINE
Payer: COMMERCIAL

## 2024-12-10 VITALS
DIASTOLIC BLOOD PRESSURE: 108 MMHG | WEIGHT: 254.85 LBS | BODY MASS INDEX: 48.15 KG/M2 | SYSTOLIC BLOOD PRESSURE: 227 MMHG | HEART RATE: 81 BPM | RESPIRATION RATE: 20 BRPM | TEMPERATURE: 97.7 F | OXYGEN SATURATION: 97 %

## 2024-12-10 DIAGNOSIS — G62.9 NEUROPATHY: Primary | ICD-10-CM

## 2024-12-10 DIAGNOSIS — E83.42 HYPOMAGNESEMIA: ICD-10-CM

## 2024-12-10 LAB
ALBUMIN SERPL BCG-MCNC: 3.7 G/DL (ref 3.5–5)
ALP SERPL-CCNC: 120 U/L (ref 34–104)
ALT SERPL W P-5'-P-CCNC: 28 U/L (ref 7–52)
ANION GAP SERPL CALCULATED.3IONS-SCNC: 8 MMOL/L (ref 4–13)
AST SERPL W P-5'-P-CCNC: 17 U/L (ref 13–39)
BASOPHILS # BLD AUTO: 0.05 THOUSANDS/ÂΜL (ref 0–0.1)
BASOPHILS NFR BLD AUTO: 1 % (ref 0–1)
BILIRUB SERPL-MCNC: 0.25 MG/DL (ref 0.2–1)
BUN SERPL-MCNC: 10 MG/DL (ref 5–25)
CALCIUM SERPL-MCNC: 8.7 MG/DL (ref 8.4–10.2)
CHLORIDE SERPL-SCNC: 101 MMOL/L (ref 96–108)
CO2 SERPL-SCNC: 27 MMOL/L (ref 21–32)
CREAT SERPL-MCNC: 0.46 MG/DL (ref 0.6–1.3)
EOSINOPHIL # BLD AUTO: 0.21 THOUSAND/ÂΜL (ref 0–0.61)
EOSINOPHIL NFR BLD AUTO: 2 % (ref 0–6)
ERYTHROCYTE [DISTWIDTH] IN BLOOD BY AUTOMATED COUNT: 15.9 % (ref 11.6–15.1)
GFR SERPL CREATININE-BSD FRML MDRD: 126 ML/MIN/1.73SQ M
GLUCOSE SERPL-MCNC: 292 MG/DL (ref 65–140)
HCT VFR BLD AUTO: 37.5 % (ref 34.8–46.1)
HGB BLD-MCNC: 13.5 G/DL (ref 11.5–15.4)
IMM GRANULOCYTES # BLD AUTO: 0.04 THOUSAND/UL (ref 0–0.2)
IMM GRANULOCYTES NFR BLD AUTO: 0 % (ref 0–2)
LYMPHOCYTES # BLD AUTO: 3.19 THOUSANDS/ÂΜL (ref 0.6–4.47)
LYMPHOCYTES NFR BLD AUTO: 33 % (ref 14–44)
MAGNESIUM SERPL-MCNC: 1.8 MG/DL (ref 1.9–2.7)
MCH RBC QN AUTO: 26.3 PG (ref 26.8–34.3)
MCHC RBC AUTO-ENTMCNC: 36 G/DL (ref 31.4–37.4)
MCV RBC AUTO: 73 FL (ref 82–98)
MONOCYTES # BLD AUTO: 0.48 THOUSAND/ÂΜL (ref 0.17–1.22)
MONOCYTES NFR BLD AUTO: 5 % (ref 4–12)
NEUTROPHILS # BLD AUTO: 5.86 THOUSANDS/ÂΜL (ref 1.85–7.62)
NEUTS SEG NFR BLD AUTO: 59 % (ref 43–75)
NRBC BLD AUTO-RTO: 0 /100 WBCS
PLATELET # BLD AUTO: 358 THOUSANDS/UL (ref 149–390)
PMV BLD AUTO: 10.7 FL (ref 8.9–12.7)
POTASSIUM SERPL-SCNC: 3.9 MMOL/L (ref 3.5–5.3)
PROT SERPL-MCNC: 6.8 G/DL (ref 6.4–8.4)
RBC # BLD AUTO: 5.14 MILLION/UL (ref 3.81–5.12)
SODIUM SERPL-SCNC: 136 MMOL/L (ref 135–147)
WBC # BLD AUTO: 9.83 THOUSAND/UL (ref 4.31–10.16)

## 2024-12-10 PROCEDURE — 93971 EXTREMITY STUDY: CPT | Performed by: SURGERY

## 2024-12-10 PROCEDURE — 96365 THER/PROPH/DIAG IV INF INIT: CPT

## 2024-12-10 PROCEDURE — 85025 COMPLETE CBC W/AUTO DIFF WBC: CPT | Performed by: EMERGENCY MEDICINE

## 2024-12-10 PROCEDURE — 36415 COLL VENOUS BLD VENIPUNCTURE: CPT | Performed by: EMERGENCY MEDICINE

## 2024-12-10 PROCEDURE — 83735 ASSAY OF MAGNESIUM: CPT | Performed by: EMERGENCY MEDICINE

## 2024-12-10 PROCEDURE — 99285 EMERGENCY DEPT VISIT HI MDM: CPT | Performed by: EMERGENCY MEDICINE

## 2024-12-10 PROCEDURE — 93971 EXTREMITY STUDY: CPT

## 2024-12-10 PROCEDURE — 96375 TX/PRO/DX INJ NEW DRUG ADDON: CPT

## 2024-12-10 PROCEDURE — 99284 EMERGENCY DEPT VISIT MOD MDM: CPT

## 2024-12-10 PROCEDURE — 80053 COMPREHEN METABOLIC PANEL: CPT | Performed by: EMERGENCY MEDICINE

## 2024-12-10 PROCEDURE — 96366 THER/PROPH/DIAG IV INF ADDON: CPT

## 2024-12-10 PROCEDURE — 96367 TX/PROPH/DG ADDL SEQ IV INF: CPT

## 2024-12-10 RX ORDER — MAGNESIUM SULFATE HEPTAHYDRATE 40 MG/ML
2 INJECTION, SOLUTION INTRAVENOUS ONCE
Status: COMPLETED | OUTPATIENT
Start: 2024-12-10 | End: 2024-12-10

## 2024-12-10 RX ORDER — NAPROXEN 500 MG/1
500 TABLET ORAL 2 TIMES DAILY WITH MEALS
Qty: 14 TABLET | Refills: 0 | Status: SHIPPED | OUTPATIENT
Start: 2024-12-10 | End: 2024-12-17

## 2024-12-10 RX ORDER — MORPHINE SULFATE 4 MG/ML
4 INJECTION, SOLUTION INTRAMUSCULAR; INTRAVENOUS ONCE
Status: COMPLETED | OUTPATIENT
Start: 2024-12-10 | End: 2024-12-10

## 2024-12-10 RX ORDER — GABAPENTIN 100 MG/1
100 CAPSULE ORAL ONCE
Status: COMPLETED | OUTPATIENT
Start: 2024-12-10 | End: 2024-12-10

## 2024-12-10 RX ORDER — MAGNESIUM CHLORIDE 71.5 G/G
2 TABLET ORAL DAILY
Qty: 14 TABLET | Refills: 0 | Status: SHIPPED | OUTPATIENT
Start: 2024-12-10 | End: 2024-12-17

## 2024-12-10 RX ORDER — ACETAMINOPHEN 10 MG/ML
1000 INJECTION, SOLUTION INTRAVENOUS ONCE
Status: COMPLETED | OUTPATIENT
Start: 2024-12-10 | End: 2024-12-10

## 2024-12-10 RX ORDER — KETOROLAC TROMETHAMINE 30 MG/ML
15 INJECTION, SOLUTION INTRAMUSCULAR; INTRAVENOUS ONCE
Status: COMPLETED | OUTPATIENT
Start: 2024-12-10 | End: 2024-12-10

## 2024-12-10 RX ADMIN — MORPHINE SULFATE 4 MG: 4 INJECTION INTRAVENOUS at 07:01

## 2024-12-10 RX ADMIN — MAGNESIUM SULFATE HEPTAHYDRATE 2 G: 40 INJECTION, SOLUTION INTRAVENOUS at 09:07

## 2024-12-10 RX ADMIN — ACETAMINOPHEN 1000 MG: 1000 INJECTION, SOLUTION INTRAVENOUS at 08:15

## 2024-12-10 RX ADMIN — GABAPENTIN 100 MG: 100 CAPSULE ORAL at 06:59

## 2024-12-10 RX ADMIN — KETOROLAC TROMETHAMINE 15 MG: 30 INJECTION, SOLUTION INTRAMUSCULAR; INTRAVENOUS at 07:01

## 2024-12-10 NOTE — ED ATTENDING ATTESTATION
12/10/2024  I, Laura Tanner MD, saw and evaluated the patient. I have discussed the patient with the resident/non-physician practitioner and agree with the resident's/non-physician practitioner's findings, Plan of Care, and MDM as documented in the resident's/non-physician practitioner's note, except where noted. All available labs and Radiology studies were reviewed.  I was present for key portions of any procedure(s) performed by the resident/non-physician practitioner and I was immediately available to provide assistance.       At this point I agree with the current assessment done in the Emergency Department.  I have conducted an independent evaluation of this patient a history and physical is as follows:    39-year-old female with history of peripheral arterial disease status post left BKA, history of DVT on Xarelto with which she reports noncompliance, chronic lower extremity neuropathy, utilizes a wheelchair.  Patient presents for evaluation of a burning discomfort in her right foot.  She has had this discomfort on and off for years but states that it has worsened in the last 3 days.  She does states that she accidentally hit the lateral aspect of her right foot/ankle against the wall when moving around with her wheelchair 5 days ago producing an abrasion over the lateral aspect of her right ankle.  Her worsening pain started a couple of days after that.  Denies redness or swelling in her foot or ankle.  No chest pain, abdominal pain, shortness of breath, low back pain, or pain that radiates down her right leg from her back.  Discomfort is described as a pins and needle sensation that is similar to neuropathy that she is experienced in the past.  Patient was previously maintained on gabapentin however self discontinued it about a year ago as she felt that it was not helping with her symptoms.    Physical Exam  Constitutional:       General: She is not in acute distress.     Appearance: She is  well-developed. She is not diaphoretic.   HENT:      Head: Normocephalic and atraumatic.      Right Ear: External ear normal.      Left Ear: External ear normal.      Nose: Nose normal.   Eyes:      Conjunctiva/sclera: Conjunctivae normal.   Cardiovascular:      Rate and Rhythm: Normal rate and regular rhythm.      Pulses: Normal pulses.      Heart sounds: Normal heart sounds. No murmur heard.     No friction rub. No gallop.      Comments: Status post left AKA.  2+ pedal pulses on the right.  Pulmonary:      Effort: Pulmonary effort is normal. No respiratory distress.      Breath sounds: Normal breath sounds. No wheezing or rales.   Abdominal:      General: Bowel sounds are normal. There is no distension.      Palpations: Abdomen is soft.      Tenderness: There is no abdominal tenderness. There is no guarding.   Musculoskeletal:         General: No deformity. Normal range of motion.      Cervical back: Normal range of motion and neck supple.      Comments: No swelling to the right lower extremity.  Compartments soft.  No right calf tenderness.  No edema to the right ankle.  Superficial abrasion overlying the lateral aspect of the right ankle without underlying swelling, ecchymosis, erythema, or drainage.  Full range of motion of the right ankle, right knee, right hip, and toes of the right foot.  No midline tenderness to palpation over the lumbar spine.   Skin:     General: Skin is warm and dry.   Neurological:      Mental Status: She is alert and oriented to person, place, and time.      Motor: No abnormal muscle tone.      Comments: Patient reports an uncomfortable pins-and-needles like sensation in a sock like distribution of the right foot.  Intact sensation to light touch throughout, decreased in a sock like distribution.  5 out of 5 strength of the proximal and distal right lower extremity.           ED Course  ED Course as of 12/10/24 1637   Tue Dec 10, 2024   0716 Patient is negative for DVT of the right  lower extremity.  Normal potassium level with slight hypomagnesemia to 1.8.  Will give dose of IV magnesium, recheck blood pressure.  Suspect pain due to patient's chronic neuropathy.  She has 2+ pedal pulses, foot is warm and well-perfused, I am not concerned for sequela of her known PAD.  Patient reports noncompliance with her home Xarelto.  She was encouraged to take this every single day not missing any doses.  Multimodal analgesia ordered.   1107 Patient feels symptomatically improved following administration of analgesia in the ED.  Suspect that her symptoms are due to her chronic neuropathy.  Will treat with course of NSAIDs with recommendation for f/u with her PCP for further management of her chronic pain. She denies improvement with gabapentin in the past. She is hypertensive in the emergency department but denies headache, vision changes, chest pain, or difficulty breathing to suggest hypertensive crisis.  She admits to noncompliance with some of her medications and was encouraged to take all of her medications as prescribed.          Critical Care Time  Procedures

## 2024-12-12 NOTE — ED PROVIDER NOTES
Time reflects when diagnosis was documented in both MDM as applicable and the Disposition within this note       Time User Action Codes Description Comment    12/10/2024  8:20 AM Elmer Marina Add [G62.9] Neuropathy     12/10/2024  8:23 AM Elmer Marina [E83.42] Hypomagnesemia           ED Disposition       ED Disposition   Discharge    Condition   Stable    Date/Time   Tue Dec 10, 2024  8:19 AM    Comment   Tony Roberto discharge to home/self care.                   Assessment & Plan       Medical Decision Making  39-year-old female, history of DVT, noncompliant with Xarelto, uncontrolled diabetes, presenting to the emergency department for evaluation of burning sensation to right lower extremity intermittently occurring for years, but worsening over the past several days.    Differential diagnoses included but not limited to: neuropathic pain, DVT, musculoskeletal strain/sprain.     Orders written for labs, and Vascular Ultrasound RLE.     Patient treated with IV Toradol, Morphine, Tylenol.    See ED course for full details.     Patient noted to have low mag here, repleted with magnesium IV.    On reevaluation patient appears well, is in no acute distress, and is comfortable discharge at this time.  Reports marked improvement in her pain, and offers no acute complaints.   Advised the patient on the emergent signs and symptoms for which she should return to the ED and encouraged continued follow-up with her PCP within 1 week for reevaluation of symptoms.  Patient strongly encouraged to adhere to medication compliance as medication noncompliance can have many detrimental effects which was discussed with the patient.  Patient verbalized understanding and was given the opportunity to ask questions.    Amount and/or Complexity of Data Reviewed  Labs: ordered. Decision-making details documented in ED Course.  Radiology:  Decision-making details documented in ED Course.    Risk  OTC drugs.  Prescription  drug management.        ED Course as of 12/12/24 1756   Thu Dec 12, 2024   1755 VAS lower limb venous duplex study, unilateral/limited  CONCLUSION:     Impression:  RIGHT LOWER LIMB  No evidence of acute or chronic deep vein thrombosis .  No evidence of superficial thrombophlebitis noted.  Doppler evaluation shows a normal response to augmentation maneuvers.  Popliteal, posterior tibial and anterior tibial arterial Doppler waveforms are  biphasic/monophasic.     LEFT LOWER LIMB LIMITED  Evaluation shows no evidence of thrombus in the common femoral vein.  Doppler evaluation shows a normal response to augmentation maneuvers.     Technical findings were given to Dr. Laura Tanner via secure chat.     SIGNATURE:  Electronically Signed by: SOUMYA HILL MD on 2024-12-10 03:37:05 PM     1755 CBC and differential(!)  Reassuring.  No evidence of leukocytosis, thrombocytopenia, anemia   1755 Comprehensive metabolic panel(!)  Negative for endorgan damage, electrolyte abnormalities   1755 Magnesium(!)  Will replete       Medications   morphine injection 4 mg (4 mg Intravenous Given 12/10/24 0701)   ketorolac (TORADOL) injection 15 mg (15 mg Intravenous Given 12/10/24 0701)   gabapentin (NEURONTIN) capsule 100 mg (100 mg Oral Given 12/10/24 0659)   magnesium sulfate 2 g/50 mL IVPB (premix) 2 g (0 g Intravenous Stopped 12/10/24 1107)   acetaminophen (Ofirmev) injection 1,000 mg (0 mg Intravenous Stopped 12/10/24 0906)       ED Risk Strat Scores                          SBIRT 22yo+      Flowsheet Row Most Recent Value   Initial Alcohol Screen: US AUDIT-C     1. How often do you have a drink containing alcohol? 0 Filed at: 12/10/2024 0820   2. How many drinks containing alcohol do you have on a typical day you are drinking?  0 Filed at: 12/10/2024 0820   3b. FEMALE Any Age, or MALE 65+: How often do you have 4 or more drinks on one occassion? 0 Filed at: 12/10/2024 0820   Audit-C Score 0 Filed at: 12/10/2024 0820   CRISTÓBAL: How many  times in the past year have you...    Used an illegal drug or used a prescription medication for non-medical reasons? Never Filed at: 12/10/2024 0820                            History of Present Illness       Chief Complaint   Patient presents with    Foot Pain     Pt arrives via ems from home with itchy/burning right foot radiating to ankle. +2 pedal pulses. HX of DVT. Left leg amputated last March.        Past Medical History:   Diagnosis Date    Asthma     Atherosclerosis     Bipolar 1 disorder (HCC)     COPD (chronic obstructive pulmonary disease) (HCC)     Depression     Diabetes mellitus (HCC)     Hypertension     Psychiatric disorder     cutting history    PTSD (post-traumatic stress disorder)     Schizoaffective disorder (HCC)     Tendonitis       Past Surgical History:   Procedure Laterality Date    AMPUTATION ABOVE KNEE (AKA) Left 3/29/2023    Procedure: AMPUTATION ABOVE KNEE (AKA);  Surgeon: Savi Langford MD;  Location: [facility];  Service: Vascular    BYPASS FEMORAL-POPLITEAL Left 2021    Procedure: Left Common Femoral Below Knee to Popliteal Bypass with Insitu GSV graft.  Left Lower Extremity Angiogram;  Surgeon: Savi Langford MD;  Location: [facility];  Service: Vascular     SECTION      EAR SURGERY      FL LUMBAR PUNCTURE DIAGNOSTIC  10/25/2018    IR LOWER EXTREMITY ANGIOGRAM  2021    IR LOWER EXTREMITY ANGIOGRAM  2021    [place] SLCTV CATHJ 3RD+ ORD SLCTV ABDL PEL/LXTR BRNCH Left 3/24/2023    Procedure: Left leg angiogram;  Surgeon: Byron Wahl DO;  Location: [facility];  Service: Vascular    TUBAL LIGATION        Family History   Problem Relation Age of Onset    Hypertension Mother     Diabetes Mother     HIV Mother     Heart disease Mother     No Known Problems Father       Social History     Tobacco Use    Smoking status: Every Day     Current packs/day: 0.50     Average packs/day: 1 pack/day for 21.5 years (20.8 ttl pk-yrs)     Types: Cigarettes     Start date: 3/24/2003      "Last attempt to quit: 3/24/2023     Passive exposure: Current    Smokeless tobacco: Former     Quit date: 4/29/2022   Vaping Use    Vaping status: Never Used   Substance Use Topics    Alcohol use: Not Currently     Comment: not currently    Drug use: Not Currently     Types: Cocaine, Marijuana, \"Crack\" cocaine     Comment: 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     HPI  Patient is a 39-year-old female with a history of peripheral artery disease status post left AKA, history of DVT on Xarelto with which she reports noncompliance, chronic lower extremity neuropathy, wheelchair user, who presents to the emergency department for evaluation of a burning discomfort in her right foot.  Patient states she has had the symptoms ongoing intermittently for years, but states her symptoms have worsened in the last 3 days.  Reported episode approximately 1 week ago where she hit her outer ankle on her wheelchair, with worsening pain beginning shortly after that.  Patient describes the pain as pins-and-needles similar to neuropathic pain she experienced in her left leg prior to amputation.  Patient denies any fevers, chills, chest pain, difficulty breathing, swelling in the leg    Review of Systems   All other systems reviewed and are negative.          Objective       ED Triage Vitals   Temperature Pulse Blood Pressure Respirations SpO2 Patient Position - Orthostatic VS   12/10/24 0606 12/10/24 0606 12/10/24 0606 12/10/24 0606 12/10/24 0606 12/10/24 0606   97.7 °F (36.5 °C) 81 (!) 227/108 20 97 % Sitting      Temp Source Heart Rate Source BP Location FiO2 (%) Pain Score    12/10/24 0606 12/10/24 0606 12/10/24 0606 -- 12/10/24 0701    Oral Monitor Right arm  10 - Worst Possible Pain      Vitals  "     Date and Time Temp Pulse SpO2 Resp BP Pain Score FACES Pain Rating User   12/10/24 0816 -- -- -- -- -- 8 -- CRISTIANA   12/10/24 0701 -- -- -- -- -- 10 - Worst Possible Pain -- MD   12/10/24 0606 97.7 °F (36.5 °C) 81 97 % 20 227/108 -- -- MD            Physical Exam  Vitals reviewed.   Constitutional:       General: She is in acute distress.      Appearance: Normal appearance. She is not ill-appearing or diaphoretic.      Comments: Patient in mild painful distress   HENT:      Right Ear: External ear normal.      Left Ear: External ear normal.      Nose: Nose normal.      Mouth/Throat:      Mouth: Mucous membranes are moist.   Cardiovascular:      Rate and Rhythm: Normal rate and regular rhythm.      Heart sounds: Normal heart sounds. No murmur heard.     No friction rub. No gallop.   Pulmonary:      Breath sounds: Normal breath sounds. No wheezing, rhonchi or rales.   Abdominal:      Palpations: Abdomen is soft.      Tenderness: There is no abdominal tenderness.   Musculoskeletal:         General: Tenderness present.      Cervical back: Neck supple.      Comments: Left AKA    No calf tenderness on right lower extremity.  No evidence of edema to right lower extremity.  2 superficial abrasions overlying the lateral malleolus of the right ankle without tenderness, ecchymosis, erythema, drainage, induration.   Full range of motion of the right lower extremity at the hip, knee, ankle, toes.   Skin:     General: Skin is warm.      Capillary Refill: Capillary refill takes less than 2 seconds.   Neurological:      Mental Status: She is alert and oriented to person, place, and time. Mental status is at baseline.      Comments: Strength and sensation intact to right lower extremity   Psychiatric:         Mood and Affect: Mood normal.         Behavior: Behavior normal.         Thought Content: Thought content normal.         Judgment: Judgment normal.         Results Reviewed       Procedure Component Value Units Date/Time     Comprehensive metabolic panel [[id number]]  (Abnormal) Collected: 12/10/24 0659    Lab Status: Final result Specimen: Blood from Arm, Right Updated: 12/10/24 0733     Sodium 136 mmol/L      Potassium 3.9 mmol/L      Chloride 101 mmol/L      CO2 27 mmol/L      ANION GAP 8 mmol/L      BUN 10 mg/dL      Creatinine 0.46 mg/dL      Glucose 292 mg/dL      Calcium 8.7 mg/dL      AST 17 U/L      ALT 28 U/L      Alkaline Phosphatase 120 U/L      Total Protein 6.8 g/dL      Albumin 3.7 g/dL      Total Bilirubin 0.25 mg/dL      eGFR 126 ml/min/1.73sq m     Narrative:      [employer] Kid[employer] guidelines for Chronic Kidney Disease (CKD):     Stage 1 with normal or high GFR (GFR > 90 mL/min/1.73 square meters)    Stage 2 Mild CKD (GFR = 60-89 mL/min/1.73 square meters)    Stage 3A Moderate CKD (GFR = 45-59 mL/min/1.73 square meters)    Stage 3B Moderate CKD (GFR = 30-44 mL/min/1.73 square meters)    Stage 4 Severe CKD (GFR = 15-29 mL/min/1.73 square meters)    Stage 5 End Stage CKD (GFR <15 mL/min/1.73 square meters)  Note: GFR calculation is accurate only with a steady state creatinine    Magnesium [[id number]]  (Abnormal) Collected: 12/10/24 0659    Lab Status: Final result Specimen: Blood from Arm, Right Updated: 12/10/24 0733     Magnesium 1.8 mg/dL     CBC and differential [[id number]]  (Abnormal) Collected: 12/10/24 0659    Lab Status: Final result Specimen: Blood from Arm, Right Updated: 12/10/24 0720     WBC 9.83 Thousand/uL      RBC 5.14 Million/uL      Hemoglobin 13.5 g/dL      Hematocrit 37.5 %      MCV 73 fL      MCH 26.3 pg      MCHC 36.0 g/dL      RDW 15.9 %      MPV 10.7 fL      Platelets 358 Thousands/uL      nRBC 0 /100 WBCs      Segmented % 59 %      Immature Grans % 0 %      Lymphocytes % 33 %      Monocytes % 5 %      Eosinophils Relative 2 %      Basophils Relative 1 %      Absolute Neutrophils 5.86 Thousands/µL      Absolute Immature Grans 0.04 Thousand/uL      Absolute Lymphocytes 3.19  "Thousands/µL      Absolute Monocytes 0.48 Thousand/µL      Eosinophils Absolute 0.21 Thousand/µL      Basophils Absolute 0.05 Thousands/µL             VAS lower limb venous duplex study, unilateral/limited   Final Interpretation by Savi Langford MD (12/10 1537)          Procedures    ED Medication and Procedure Management   Prior to Admission Medications   Prescriptions Last Dose Informant Patient Reported? Taking?   Advair -21 MCG/ACT inhaler  Self No No   Sig: Inhale 2 puffs 2 (two) times a day Rinse mouth after use   Alcohol Swabs (Pharmacist Choice Alcohol) PADS  Self No No   Sig: USE 3-4 TIMES AS DIRECTED.   B-D INS SYR MICROFINE .5CC/28G 28G X 1/2\" 0.5 ML MISC  Self No No   Sig: USE AS DIRECTED   Blood Glucose Monitoring Suppl (ONE TOUCH ULTRA 2) w/Device KIT  Self No No   Sig: BY DEVICE ROUTE 2 (TWO) TIMES A DAY CHECK BLOOD SUGARS AND RECORD VALUE AND TIME OF DAY TWICE A DAY   Blood Pressure Monitoring (Blood Pressure Monitor Automat) KATLYN  Self No No   Sig: Use Daily as Directed   Patient not taking: Reported on 2024   Comfort EZ Pen Needles 33G X 4 MM MISC  Self No No   Sig: USE AS DIRECTED   DULoxetine (CYMBALTA) 30 mg delayed release capsule   No No   Sig: TAKE 2 CAPSULES (60 MG TOTAL) BY MOUTH DAILY   Dulaglutide (Trulicity) 1.5 MG/0.5ML SOAJ   No No   Sig: AS DIRECTED WEEKLY   Global Inject Ease Lancets 30G MISC  Self No No   Sig: USE 3 (THREE) TIMES A DAY   Insulin Pen Needle (BD Pen Needle Leann 2nd Gen) 32G X 4 MM MISC   No No   Sig: INJECT UNDER THE SKIN 4 (FOUR) TIMES A DAY (BEFORE MEALS AND AT BEDTIME)   Lantus 100 UNIT/ML subcutaneous injection  Self No No   Sig: INJECT 30 UNITS UNDER THE SKIN EVERY 12 (TWELVE) HOURS   OneTouch Ultra test strip  Self No No   Sig: Use 1 each daily as needed (before meals) Use as instructed   Sure Comfort Insulin Syringe 31G X 5/16\" 0.3 ML MISC  Self Yes No   Si (two) times a day Use as directed   albuterol (PROVENTIL HFA,VENTOLIN HFA) 90 mcg/act " inhaler   No No   Sig: INHALE 2 PUFFS EVERY 4 HOURS AS NEEDED FOR WHEEZING OR SHORTNESS OF BREATH   aspirin 81 mg chewable tablet   No No   Sig: CHEW 1 TABLET (81 MG TOTAL) DAILY   atorvastatin (LIPITOR) 40 mg tablet  Self No No   Sig: TAKE 1 TABLET (40 MG TOTAL) BY MOUTH DAILY WITH DINNER   dulaglutide (Trulicity) 0.75 MG/0.5ML injection  Self No No   Sig: Inject 0.5 mL (0.75 mg total) under the skin every 7 days   glycerin-hypromellose- (ARTIFICIAL TEARS) 0.2-0.2-1 % SOLN  Self No No   Sig: Administer 2 drops to both eyes every 6 (six) hours as needed (for eye discomfort)   hydrOXYzine HCL (ATARAX) 50 mg tablet  Self No No   Sig: TAKE 1 TABLET (50 MG TOTAL) BY MOUTH DAILY AT BEDTIME AS NEEDED FOR ITCHING OR ANXIETY   insulin lispro (HumALOG/ADMELOG) 100 units/mL injection  Self No No   Sig: INJECT 4-16 UNITS UNDER THE SKIN 3 (THREE) TIMES A DAY BEFORE MEALS   ketotifen (ZADITOR) 0.025 % ophthalmic solution  Self No No   Sig: Administer 1 drop to both eyes 2 (two) times a day as needed (for eye discomfort)   lisinopril (ZESTRIL) 20 mg tablet  Self No No   Sig: TAKE 1 TABLET (20 MG TOTAL) BY MOUTH DAILY   metFORMIN (GLUCOPHAGE) 1000 MG tablet   No No   Sig: TAKE ONE (1) TABLET BY MOUTH TWICE DAILY WITH FOOD   nicotine (NICODERM CQ) 21 mg/24 hr TD 24 hr patch  Self No No   Sig: Place 1 patch on the skin over 24 hours daily   Patient not taking: Reported on 9/11/2024   prazosin (MINIPRESS) 1 mg capsule   No No   Sig: TAKE ONE (1) CAPSULE ONCE A DAY AT BEDTIME FOR HYPERTENSION   rivaroxaban (Xarelto) 20 mg tablet   No No   Sig: TAKE 1 TABLET (20 MG TOTAL) BY MOUTH DAILY WITH BREAKFAST   topiramate (TOPAMAX) 25 mg tablet  Self No No   Sig: TAKE 1 TABLET (25 MG TOTAL) BY MOUTH 2 (TWO) TIMES A DAY      Facility-Administered Medications: None     Discharge Medication List as of 12/10/2024  8:23 AM        START taking these medications    Details   magnesium chloride-calcium (Slow-Mag) 71.5-119 mg Take 2 tablets by  "mouth daily for 7 days, Starting Tue 12/10/2024, Until Tue 12/17/2024, Normal      naproxen (Naprosyn) 500 mg tablet Take 1 tablet (500 mg total) by mouth 2 (two) times a day with meals for 7 days, Starting Tue 12/10/2024, Until Tue 12/17/2024, Normal           CONTINUE these medications which have NOT CHANGED    Details   Advair -21 MCG/ACT inhaler Inhale 2 puffs 2 (two) times a day Rinse mouth after use, Starting Wed 8/9/2023, Normal      albuterol (PROVENTIL HFA,VENTOLIN HFA) 90 mcg/act inhaler INHALE 2 PUFFS EVERY 4 HOURS AS NEEDED FOR WHEEZING OR SHORTNESS OF BREATH, Normal      Alcohol Swabs (Pharmacist Choice Alcohol) PADS USE 3-4 TIMES AS DIRECTED., Normal      aspirin 81 mg chewable tablet CHEW 1 TABLET (81 MG TOTAL) DAILY, Starting Thu 9/12/2024, Normal      atorvastatin (LIPITOR) 40 mg tablet TAKE 1 TABLET (40 MG TOTAL) BY MOUTH DAILY WITH DINNER, Starting Wed 8/7/2024, Normal      B-D INS SYR MICROFINE .5CC/28G 28G X 1/2\" 0.5 ML MISC USE AS DIRECTED, Normal      Blood Glucose Monitoring Suppl (ONE TOUCH ULTRA 2) w/Device KIT BY DEVICE ROUTE 2 (TWO) TIMES A DAY CHECK BLOOD SUGARS AND RECORD VALUE AND TIME OF DAY TWICE A DAY, Normal      Blood Pressure Monitoring (Blood Pressure Monitor Automat) KATLYN Use Daily as Directed, Normal      !! Comfort EZ Pen Needles 33G X 4 MM MISC USE AS DIRECTED, Normal      dulaglutide (Trulicity) 0.75 MG/0.5ML injection Inject 0.5 mL (0.75 mg total) under the skin every 7 days, Starting Tue 4/2/2024, Normal      Dulaglutide (Trulicity) 1.5 MG/0.5ML SOAJ AS DIRECTED WEEKLY, Normal      DULoxetine (CYMBALTA) 30 mg delayed release capsule TAKE 2 CAPSULES (60 MG TOTAL) BY MOUTH DAILY, Starting Wed 9/18/2024, Normal      Global Inject Ease Lancets 30G MISC USE 3 (THREE) TIMES A DAY, Starting Wed 11/1/2023, Normal      glycerin-hypromellose- (ARTIFICIAL TEARS) 0.2-0.2-1 % SOLN Administer 2 drops to both eyes every 6 (six) hours as needed (for eye discomfort), " "Starting Mon 8/28/2023, Normal      hydrOXYzine HCL (ATARAX) 50 mg tablet TAKE 1 TABLET (50 MG TOTAL) BY MOUTH DAILY AT BEDTIME AS NEEDED FOR ITCHING OR ANXIETY, Starting Wed 10/18/2023, Normal      insulin lispro (HumALOG/ADMELOG) 100 units/mL injection INJECT 4-16 UNITS UNDER THE SKIN 3 (THREE) TIMES A DAY BEFORE MEALS, Starting Thu 5/30/2024, Normal      !! Insulin Pen Needle (BD Pen Needle Leann 2nd Gen) 32G X 4 MM MISC INJECT UNDER THE SKIN 4 (FOUR) TIMES A DAY (BEFORE MEALS AND AT BEDTIME), Normal      ketotifen (ZADITOR) 0.025 % ophthalmic solution Administer 1 drop to both eyes 2 (two) times a day as needed (for eye discomfort), Starting Mon 8/28/2023, Normal      Lantus 100 UNIT/ML subcutaneous injection INJECT 30 UNITS UNDER THE SKIN EVERY 12 (TWELVE) HOURS, Normal      lisinopril (ZESTRIL) 20 mg tablet TAKE 1 TABLET (20 MG TOTAL) BY MOUTH DAILY, Starting Wed 7/24/2024, Normal      metFORMIN (GLUCOPHAGE) 1000 MG tablet TAKE ONE (1) TABLET BY MOUTH TWICE DAILY WITH FOOD, Normal      nicotine (NICODERM CQ) 21 mg/24 hr TD 24 hr patch Place 1 patch on the skin over 24 hours daily, Starting Mon 9/18/2023, Normal      OneTouch Ultra test strip Use 1 each daily as needed (before meals) Use as instructed, Starting Mon 9/18/2023, Normal      prazosin (MINIPRESS) 1 mg capsule TAKE ONE (1) CAPSULE ONCE A DAY AT BEDTIME FOR HYPERTENSION, Normal      rivaroxaban (Xarelto) 20 mg tablet TAKE 1 TABLET (20 MG TOTAL) BY MOUTH DAILY WITH BREAKFAST, Normal      Sure Comfort Insulin Syringe 31G X 5/16\" 0.3 ML MISC 2 (two) times a day Use as directed, Starting Thu 11/9/2023, Historical Med      topiramate (TOPAMAX) 25 mg tablet TAKE 1 TABLET (25 MG TOTAL) BY MOUTH 2 (TWO) TIMES A DAY, Starting Fri 10/6/2023, Normal       !! - Potential duplicate medications found. Please discuss with provider.        No discharge procedures on file.  ED SEPSIS DOCUMENTATION   Time reflects when diagnosis was documented in both MDM as applicable " and the Disposition within this note       Time User Action Codes Description Comment    12/10/2024  8:20 AM Elmer Marina [G62.9] Neuropathy     12/10/2024  8:23 AM Elmer Marina [E83.42] Hypomagnesemia                  Elmer Marina V,   12/12/24 1754

## 2024-12-19 ENCOUNTER — HOSPITAL ENCOUNTER (EMERGENCY)
Facility: HOSPITAL | Age: 39
Discharge: HOME | End: 2024-12-20
Attending: EMERGENCY MEDICINE | Admitting: EMERGENCY MEDICINE
Payer: COMMERCIAL

## 2024-12-19 VITALS
DIASTOLIC BLOOD PRESSURE: 69 MMHG | TEMPERATURE: 98.5 F | RESPIRATION RATE: 16 BRPM | OXYGEN SATURATION: 99 % | HEART RATE: 88 BPM | SYSTOLIC BLOOD PRESSURE: 149 MMHG

## 2024-12-19 DIAGNOSIS — L03.311 CELLULITIS OF ABDOMINAL WALL: Primary | ICD-10-CM

## 2024-12-19 LAB
BILIRUB UR QL STRIP: NEGATIVE
CLARITY UR: CLEAR
COLOR UR: YELLOW
EXT PREGNANCY TEST URINE: NEGATIVE
EXT. CONTROL: NORMAL
GLUCOSE UR STRIP-MCNC: ABNORMAL MG/DL
HGB UR QL STRIP.AUTO: NEGATIVE
KETONES UR STRIP-MCNC: NEGATIVE MG/DL
LEUKOCYTE ESTERASE UR QL STRIP: NEGATIVE
NITRITE UR QL STRIP: NEGATIVE
PH UR STRIP.AUTO: 6.5 [PH]
PROT UR STRIP-MCNC: NEGATIVE MG/DL
SP GR UR STRIP.AUTO: 1.01 (ref 1–1.03)
UROBILINOGEN UR QL STRIP.AUTO: 2 E.U./DL

## 2024-12-19 PROCEDURE — 99284 EMERGENCY DEPT VISIT MOD MDM: CPT | Performed by: EMERGENCY MEDICINE

## 2024-12-19 PROCEDURE — 81025 URINE PREGNANCY TEST: CPT | Performed by: EMERGENCY MEDICINE

## 2024-12-19 PROCEDURE — 99282 EMERGENCY DEPT VISIT SF MDM: CPT

## 2024-12-19 PROCEDURE — 81003 URINALYSIS AUTO W/O SCOPE: CPT | Performed by: EMERGENCY MEDICINE

## 2024-12-19 PROCEDURE — 76882 US LMTD JT/FCL EVL NVASC XTR: CPT | Performed by: EMERGENCY MEDICINE

## 2024-12-19 RX ORDER — KETOROLAC TROMETHAMINE 30 MG/ML
30 INJECTION, SOLUTION INTRAMUSCULAR; INTRAVENOUS ONCE
Status: COMPLETED | OUTPATIENT
Start: 2024-12-20 | End: 2024-12-20

## 2024-12-19 RX ORDER — ACETAMINOPHEN 325 MG/1
975 TABLET ORAL ONCE
Status: COMPLETED | OUTPATIENT
Start: 2024-12-19 | End: 2024-12-19

## 2024-12-19 RX ADMIN — ACETAMINOPHEN 975 MG: 325 TABLET, FILM COATED ORAL at 23:04

## 2024-12-20 PROCEDURE — 96372 THER/PROPH/DIAG INJ SC/IM: CPT

## 2024-12-20 RX ORDER — ACETAMINOPHEN 500 MG
1000 TABLET ORAL EVERY 8 HOURS PRN
Qty: 60 TABLET | Refills: 0 | Status: SHIPPED | OUTPATIENT
Start: 2024-12-20

## 2024-12-20 RX ORDER — IBUPROFEN 600 MG/1
600 TABLET, FILM COATED ORAL EVERY 8 HOURS PRN
Qty: 30 TABLET | Refills: 0 | Status: SHIPPED | OUTPATIENT
Start: 2024-12-20

## 2024-12-20 RX ADMIN — KETOROLAC TROMETHAMINE 30 MG: 30 INJECTION, SOLUTION INTRAMUSCULAR at 00:04

## 2024-12-20 RX ADMIN — CEPHALEXIN 500 MG: 250 CAPSULE ORAL at 00:10

## 2024-12-20 NOTE — ED PROVIDER NOTES
Time reflects when diagnosis was documented in both MDM as applicable and the Disposition within this note       Time User Action Codes Description Comment    12/20/2024 12:17 AM EllyJose Gareth [L03.311] Cellulitis of abdominal wall           ED Disposition       ED Disposition   Discharge    Condition   Stable    Date/Time   Fri Dec 20, 2024 12:17 AM    Comment   Tony HEAVEN Roberto discharge to home/self care.                   Assessment & Plan       Medical Decision Making  40 y/o female with hx of HTN, diabetes, PTSD, bipolar disorder, and schizoaffective disorder presents to the ED for evaluation of possible abscess on her right lower abdominal wall that has been present over the last week.  She states that over the last week she has noticed an area of swelling and pain in her right lower abdominal wall and she thought it was an abscess filled with pus.  She reports attempting to drain it with her diabetic lancets multiple times over the last week, expressing a small amount of purulent fluid.  She denies any recent injuries or direct trauma to her abdominal wall.  She denies any regular abdominal wall medication injections.  She also reports increased urinary frequency without pain or discomfort, which she thinks may be related to her diabetes but is uncertain.  She denies any fever, chills, cough, dyspnea, chest pain, abdominal pain, nausea, vomiting, diarrhea, dysuria, hematuria, vaginal bleeding, or vaginal discharge.    Vital signs reviewed.  See physical exam documentation for exam findings.  Differential diagnosis includes but is not limited to abscess, cellulitis, diabetic polyuria, and/or UTI.  Will evaluate with UA as well as point-of-care bedside ultrasound. I performed point-of-care soft tissue ultrasound as documented in procedure note.  Very slight cobblestoning, however no discrete fluid collection seen, does not appear consistent with current abscess requiring I&D, however possible component  of cellulitis.  UA shows no evidence of bacterial UTI, presence of glucose consistent with known diagnosis of diabetes and likely contributing to her polyuria.  Pain treated with acetaminophen and ketorolac, and will cover for cellulitis with cephalexin. I discussed all findings, treatment, red flags/return precautions, and outpatient follow-up and the patient/family understand and agree. Stable for discharge.    Amount and/or Complexity of Data Reviewed  Labs: ordered. Decision-making details documented in ED Course.    Risk  OTC drugs.  Prescription drug management.        ED Course as of 12/20/24 0049   Thu Dec 19, 2024   2346 UA w Reflex to Microscopic w Reflex to Culture(!)   2346 POCT pregnancy, urine   2346 No evidence of bacterial UTI, pregnancy testing negative       Medications   acetaminophen (TYLENOL) tablet 975 mg (975 mg Oral Given 12/19/24 2304)   ketorolac (TORADOL) injection 30 mg (30 mg Intramuscular Given 12/20/24 0004)   cephalexin (KEFLEX) capsule 500 mg (500 mg Oral Given 12/20/24 0010)       ED Risk Strat Scores                          SBIRT 20yo+      Flowsheet Row Most Recent Value   Initial Alcohol Screen: US AUDIT-C     1. How often do you have a drink containing alcohol? 0 Filed at: 12/19/2024 2302   2. How many drinks containing alcohol do you have on a typical day you are drinking?  0 Filed at: 12/19/2024 2302   3a. Male UNDER 65: How often do you have five or more drinks on one occasion? 0 Filed at: 12/19/2024 2302   3b. FEMALE Any Age, or MALE 65+: How often do you have 4 or more drinks on one occassion? 0 Filed at: 12/19/2024 2302   Audit-C Score 0 Filed at: 12/19/2024 2302   CRISTÓBAL: How many times in the past year have you...    Used an illegal drug or used a prescription medication for non-medical reasons? Never Filed at: 12/19/2024 2302                            History of Present Illness       Chief Complaint   Patient presents with    Abscess     Pt reports an abscess on RLQ  "that's been there for a week. Pt reports lancing it with her lancets and pus has come out of it.        Past Medical History:   Diagnosis Date    Asthma     Atherosclerosis     Bipolar 1 disorder (HCC)     COPD (chronic obstructive pulmonary disease) (HCC)     Depression     Diabetes mellitus (HCC)     Hypertension     Psychiatric disorder     cutting history    PTSD (post-traumatic stress disorder)     Schizoaffective disorder (HCC)     Tendonitis       Past Surgical History:   Procedure Laterality Date    AMPUTATION ABOVE KNEE (AKA) Left 3/29/2023    Procedure: AMPUTATION ABOVE KNEE (AKA);  Surgeon: Savi Langford MD;  Location: [facility];  Service: Vascular    BYPASS FEMORAL-POPLITEAL Left 2021    Procedure: Left Common Femoral Below Knee to Popliteal Bypass with Insitu GSV graft.  Left Lower Extremity Angiogram;  Surgeon: Savi Langford MD;  Location: [facility];  Service: Vascular     SECTION      EAR SURGERY      FL LUMBAR PUNCTURE DIAGNOSTIC  10/25/2018    IR LOWER EXTREMITY ANGIOGRAM  2021    IR LOWER EXTREMITY ANGIOGRAM  2021    MS SLCTV CATHJ 3RD+ ORD SLCTV ABDL PEL/LXTR BRNCH Left 3/24/2023    Procedure: Left leg angiogram;  Surgeon: Byorn Wahl DO;  Location: [facility];  Service: Vascular    TUBAL LIGATION        Family History   Problem Relation Age of Onset    Hypertension Mother     Diabetes Mother     HIV Mother     Heart disease Mother     No Known Problems Father       Social History     Tobacco Use    Smoking status: Every Day     Current packs/day: 0.50     Average packs/day: 1 pack/day for 21.6 years (20.8 ttl pk-yrs)     Types: Cigarettes     Start date: 3/24/2003     Last attempt to quit: 3/24/2023     Passive exposure: Current    Smokeless tobacco: Former     Quit date: 2022   Vaping Use    Vaping status: Never Used   Substance Use Topics    Alcohol use: Not Currently     Comment: not currently    Drug use: Not Currently     Types: Cocaine, Marijuana, \"Crack\" " cocaine     Comment: 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     40 y/o female with hx of HTN, diabetes, PTSD, bipolar disorder, and schizoaffective disorder presents to the ED for evaluation of possible abscess on her right lower abdominal wall that has been present over the last week.  She states that over the last week she has noticed an area of swelling and pain in her right lower abdominal wall and she thought it was an abscess filled with pus.  She reports attempting to drain it with her diabetic lancets multiple times over the last week, expressing a small amount of purulent fluid.  She denies any recent injuries or direct trauma to her abdominal wall.  She denies any regular abdominal wall medication injections.  She also reports increased urinary frequency without pain or discomfort, which she thinks may be related to her diabetes but is uncertain.  She denies any fever, chills, cough, dyspnea, chest pain, abdominal pain, nausea, vomiting, diarrhea, dysuria, hematuria, vaginal bleeding, or vaginal discharge.        Review of Systems   Constitutional:  Negative for chills and fever.   HENT:  Negative for congestion, rhinorrhea and sore throat.    Respiratory:  Negative for cough and shortness of breath.    Cardiovascular:  Negative for chest pain and palpitations.   Gastrointestinal:  Negative for abdominal pain, diarrhea, nausea and vomiting.   Genitourinary:  Negative for dysuria and hematuria.   Musculoskeletal:  Negative for back pain and neck pain.   Skin:         Right lower abdominal wall rash/possible abscess, see HPI.   Neurological:  Negative for dizziness, weakness, light-headedness, numbness and headaches.   All other systems reviewed and are  negative.          Objective       ED Triage Vitals   Temperature Pulse Blood Pressure Respirations SpO2 Patient Position - Orthostatic VS   12/19/24 2238 12/19/24 2238 12/19/24 2238 12/19/24 2238 12/19/24 2238 12/19/24 2238   98.5 °F (36.9 °C) 85 121/83 16 99 % Lying      Temp Source Heart Rate Source BP Location FiO2 (%) Pain Score    12/19/24 2238 12/19/24 2238 12/19/24 2238 -- 12/19/24 2304    Oral Monitor Right arm  10 - Worst Possible Pain      Vitals      Date and Time Temp Pulse SpO2 Resp BP Pain Score FACES Pain Rating User   12/20/24 0004 -- -- -- -- -- 10 - Worst Possible Pain -- KT   12/19/24 2304 -- -- -- -- -- 10 - Worst Possible Pain -- DS   12/19/24 2245 -- 88 99 % 16 149/69 -- -- DS   12/19/24 2238 98.5 °F (36.9 °C) 85 99 % 16 121/83 -- -- NO            Physical Exam  Vitals and nursing note reviewed.   Constitutional:       General: She is not in acute distress.     Appearance: Normal appearance. She is normal weight. She is not ill-appearing.   HENT:      Head: Normocephalic and atraumatic.      Right Ear: External ear normal.      Left Ear: External ear normal.      Nose: Nose normal. No congestion or rhinorrhea.      Mouth/Throat:      Mouth: Mucous membranes are moist.      Pharynx: Oropharynx is clear. No oropharyngeal exudate or posterior oropharyngeal erythema.   Eyes:      Extraocular Movements: Extraocular movements intact.      Conjunctiva/sclera: Conjunctivae normal.      Pupils: Pupils are equal, round, and reactive to light.   Cardiovascular:      Rate and Rhythm: Normal rate and regular rhythm.      Pulses: Normal pulses.      Heart sounds: Normal heart sounds. No murmur heard.  Pulmonary:      Effort: Pulmonary effort is normal. No respiratory distress.      Breath sounds: Normal breath sounds. No wheezing or rales.   Abdominal:      General: Abdomen is flat. Bowel sounds are normal. There is no distension.      Palpations: Abdomen is soft.      Tenderness: There is no abdominal  tenderness. There is no right CVA tenderness, left CVA tenderness or guarding.   Musculoskeletal:         General: No swelling or tenderness. Normal range of motion.      Cervical back: Normal range of motion and neck supple. No tenderness.   Skin:     General: Skin is warm and dry.      Capillary Refill: Capillary refill takes less than 2 seconds.      Comments: On the right lower abdominal wall there is an area of indurated skin that is mildly tender to palpation, with no active drainage.  See image attached.   Neurological:      General: No focal deficit present.      Mental Status: She is alert and oriented to person, place, and time.           Results Reviewed       Procedure Component Value Units Date/Time    POCT pregnancy, urine [[id number]]  (Normal) Collected: 12/19/24 2334    Lab Status: Final result Updated: 12/19/24 2334     EXT Preg Test, Ur Negative     Control Valid    UA w Reflex to Microscopic w Reflex to Culture [[id number]]  (Abnormal) Collected: 12/19/24 2305    Lab Status: Final result Specimen: Urine, Clean Catch Updated: 12/19/24 2311     Color, UA Yellow     Clarity, UA Clear     Specific Gravity, UA 1.015     pH, UA 6.5     Leukocytes, UA Negative     Nitrite, UA Negative     Protein, UA Negative mg/dl      Glucose, UA 3+ mg/dl      Ketones, UA Negative mg/dl      Urobilinogen, UA 2.0 E.U./dl      Bilirubin, UA Negative     Occult Blood, UA Negative            No orders to display       POC MSK/Soft Tissue US    Date/Time: 12/19/2024 11:05 PM    Performed by: Jose Sanabria MD  Authorized by: Jose Sanabria MD    Patient location:  ED  Performed by:  Attending  Other Assisting Provider: No    Procedure:     Performed: soft tissue ultrasound    Procedure details:     Exam Type:  Diagnostic    Longitudinal view:  Obtained    Transverse view:  Obtained    Image quality: diagnostic      Image availability:  Images available in PACS  Soft tissue ultrasound:     Soft tissue indications: pain and  "suspected abscess      Anatomic location:  Abdominal wall    Soft tissue findings comment:  Very slight cobblestoning, however no discrete fluid collection seen  Interpretation:     Soft tissue impressions: consistent with cellulitis        ED Medication and Procedure Management   Prior to Admission Medications   Prescriptions Last Dose Informant Patient Reported? Taking?   Advair -21 MCG/ACT inhaler  Self No No   Sig: Inhale 2 puffs 2 (two) times a day Rinse mouth after use   Alcohol Swabs (Pharmacist Choice Alcohol) PADS  Self No No   Sig: USE 3-4 TIMES AS DIRECTED.   B-D INS SYR MICROFINE .5CC/28G 28G X 1/2\" 0.5 ML MISC  Self No No   Sig: USE AS DIRECTED   Blood Glucose Monitoring Suppl (ONE TOUCH ULTRA 2) w/Device KIT  Self No No   Sig: BY DEVICE ROUTE 2 (TWO) TIMES A DAY CHECK BLOOD SUGARS AND RECORD VALUE AND TIME OF DAY TWICE A DAY   Blood Pressure Monitoring (Blood Pressure Monitor Automat) KATLYN  Self No No   Sig: Use Daily as Directed   Patient not taking: Reported on 2024   Comfort EZ Pen Needles 33G X 4 MM MISC  Self No No   Sig: USE AS DIRECTED   DULoxetine (CYMBALTA) 30 mg delayed release capsule   No No   Sig: TAKE 2 CAPSULES (60 MG TOTAL) BY MOUTH DAILY   Dulaglutide (Trulicity) 1.5 MG/0.5ML SOAJ   No No   Sig: AS DIRECTED WEEKLY   Global Inject Ease Lancets 30G MISC  Self No No   Sig: USE 3 (THREE) TIMES A DAY   Insulin Pen Needle (BD Pen Needle Leann 2nd Gen) 32G X 4 MM MISC   No No   Sig: INJECT UNDER THE SKIN 4 (FOUR) TIMES A DAY (BEFORE MEALS AND AT BEDTIME)   Lantus 100 UNIT/ML subcutaneous injection  Self No No   Sig: INJECT 30 UNITS UNDER THE SKIN EVERY 12 (TWELVE) HOURS   OneTouch Ultra test strip  Self No No   Sig: Use 1 each daily as needed (before meals) Use as instructed   Sure Comfort Insulin Syringe 31G X 5/16\" 0.3 ML MISC  Self Yes No   Si (two) times a day Use as directed   albuterol (PROVENTIL HFA,VENTOLIN HFA) 90 mcg/act inhaler   No No   Sig: INHALE 2 PUFFS EVERY 4 " HOURS AS NEEDED FOR WHEEZING OR SHORTNESS OF BREATH   aspirin 81 mg chewable tablet   No No   Sig: CHEW 1 TABLET (81 MG TOTAL) DAILY   atorvastatin (LIPITOR) 40 mg tablet  Self No No   Sig: TAKE 1 TABLET (40 MG TOTAL) BY MOUTH DAILY WITH DINNER   dulaglutide (Trulicity) 0.75 MG/0.5ML injection  Self No No   Sig: Inject 0.5 mL (0.75 mg total) under the skin every 7 days   glycerin-hypromellose- (ARTIFICIAL TEARS) 0.2-0.2-1 % SOLN  Self No No   Sig: Administer 2 drops to both eyes every 6 (six) hours as needed (for eye discomfort)   hydrOXYzine HCL (ATARAX) 50 mg tablet  Self No No   Sig: TAKE 1 TABLET (50 MG TOTAL) BY MOUTH DAILY AT BEDTIME AS NEEDED FOR ITCHING OR ANXIETY   insulin lispro (HumALOG/ADMELOG) 100 units/mL injection  Self No No   Sig: INJECT 4-16 UNITS UNDER THE SKIN 3 (THREE) TIMES A DAY BEFORE MEALS   ketotifen (ZADITOR) 0.025 % ophthalmic solution  Self No No   Sig: Administer 1 drop to both eyes 2 (two) times a day as needed (for eye discomfort)   lisinopril (ZESTRIL) 20 mg tablet  Self No No   Sig: TAKE 1 TABLET (20 MG TOTAL) BY MOUTH DAILY   magnesium chloride-calcium (Slow-Mag) 71.5-119 mg   No No   Sig: Take 2 tablets by mouth daily for 7 days   metFORMIN (GLUCOPHAGE) 1000 MG tablet   No No   Sig: TAKE ONE (1) TABLET BY MOUTH TWICE DAILY WITH FOOD   naproxen (Naprosyn) 500 mg tablet   No No   Sig: Take 1 tablet (500 mg total) by mouth 2 (two) times a day with meals for 7 days   nicotine (NICODERM CQ) 21 mg/24 hr TD 24 hr patch  Self No No   Sig: Place 1 patch on the skin over 24 hours daily   Patient not taking: Reported on 9/11/2024   prazosin (MINIPRESS) 1 mg capsule   No No   Sig: TAKE ONE (1) CAPSULE ONCE A DAY AT BEDTIME FOR HYPERTENSION   rivaroxaban (Xarelto) 20 mg tablet   No No   Sig: TAKE 1 TABLET (20 MG TOTAL) BY MOUTH DAILY WITH BREAKFAST   topiramate (TOPAMAX) 25 mg tablet  Self No No   Sig: TAKE 1 TABLET (25 MG TOTAL) BY MOUTH 2 (TWO) TIMES A DAY      Facility-Administered  "Medications: None     Discharge Medication List as of 12/20/2024 12:18 AM        START taking these medications    Details   acetaminophen (TYLENOL) 500 mg tablet Take 2 tablets (1,000 mg total) by mouth every 8 (eight) hours as needed for mild pain, Starting Fri 12/20/2024, Normal      cephalexin (KEFLEX) 250 mg capsule Take 2 capsules (500 mg total) by mouth 4 (four) times a day for 7 days, Starting Fri 12/20/2024, Until Fri 12/27/2024, Normal      ibuprofen (MOTRIN) 600 mg tablet Take 1 tablet (600 mg total) by mouth every 8 (eight) hours as needed for mild pain, Starting Fri 12/20/2024, Normal           CONTINUE these medications which have NOT CHANGED    Details   Advair -21 MCG/ACT inhaler Inhale 2 puffs 2 (two) times a day Rinse mouth after use, Starting Wed 8/9/2023, Normal      albuterol (PROVENTIL HFA,VENTOLIN HFA) 90 mcg/act inhaler INHALE 2 PUFFS EVERY 4 HOURS AS NEEDED FOR WHEEZING OR SHORTNESS OF BREATH, Normal      Alcohol Swabs (Pharmacist Choice Alcohol) PADS USE 3-4 TIMES AS DIRECTED., Normal      aspirin 81 mg chewable tablet CHEW 1 TABLET (81 MG TOTAL) DAILY, Starting Thu 9/12/2024, Normal      atorvastatin (LIPITOR) 40 mg tablet TAKE 1 TABLET (40 MG TOTAL) BY MOUTH DAILY WITH DINNER, Starting Wed 8/7/2024, Normal      B-D INS SYR MICROFINE .5CC/28G 28G X 1/2\" 0.5 ML MISC USE AS DIRECTED, Normal      Blood Glucose Monitoring Suppl (ONE TOUCH ULTRA 2) w/Device KIT BY DEVICE ROUTE 2 (TWO) TIMES A DAY CHECK BLOOD SUGARS AND RECORD VALUE AND TIME OF DAY TWICE A DAY, Normal      Blood Pressure Monitoring (Blood Pressure Monitor Automat) KATLYN Use Daily as Directed, Normal      !! Comfort EZ Pen Needles 33G X 4 MM MISC USE AS DIRECTED, Normal      dulaglutide (Trulicity) 0.75 MG/0.5ML injection Inject 0.5 mL (0.75 mg total) under the skin every 7 days, Starting Tue 4/2/2024, Normal      Dulaglutide (Trulicity) 1.5 MG/0.5ML SOAJ AS DIRECTED WEEKLY, Normal      DULoxetine (CYMBALTA) 30 mg delayed " release capsule TAKE 2 CAPSULES (60 MG TOTAL) BY MOUTH DAILY, Starting Wed 9/18/2024, Normal      Global Inject Ease Lancets 30G MISC USE 3 (THREE) TIMES A DAY, Starting Wed 11/1/2023, Normal      glycerin-hypromellose- (ARTIFICIAL TEARS) 0.2-0.2-1 % SOLN Administer 2 drops to both eyes every 6 (six) hours as needed (for eye discomfort), Starting Mon 8/28/2023, Normal      hydrOXYzine HCL (ATARAX) 50 mg tablet TAKE 1 TABLET (50 MG TOTAL) BY MOUTH DAILY AT BEDTIME AS NEEDED FOR ITCHING OR ANXIETY, Starting Wed 10/18/2023, Normal      insulin lispro (HumALOG/ADMELOG) 100 units/mL injection INJECT 4-16 UNITS UNDER THE SKIN 3 (THREE) TIMES A DAY BEFORE MEALS, Starting Thu 5/30/2024, Normal      !! Insulin Pen Needle (BD Pen Needle Leann 2nd Gen) 32G X 4 MM MISC INJECT UNDER THE SKIN 4 (FOUR) TIMES A DAY (BEFORE MEALS AND AT BEDTIME), Normal      ketotifen (ZADITOR) 0.025 % ophthalmic solution Administer 1 drop to both eyes 2 (two) times a day as needed (for eye discomfort), Starting Mon 8/28/2023, Normal      Lantus 100 UNIT/ML subcutaneous injection INJECT 30 UNITS UNDER THE SKIN EVERY 12 (TWELVE) HOURS, Normal      lisinopril (ZESTRIL) 20 mg tablet TAKE 1 TABLET (20 MG TOTAL) BY MOUTH DAILY, Starting Wed 7/24/2024, Normal      magnesium chloride-calcium (Slow-Mag) 71.5-119 mg Take 2 tablets by mouth daily for 7 days, Starting Tue 12/10/2024, Until Tue 12/17/2024, Normal      metFORMIN (GLUCOPHAGE) 1000 MG tablet TAKE ONE (1) TABLET BY MOUTH TWICE DAILY WITH FOOD, Normal      naproxen (Naprosyn) 500 mg tablet Take 1 tablet (500 mg total) by mouth 2 (two) times a day with meals for 7 days, Starting Tue 12/10/2024, Until Tue 12/17/2024, Normal      nicotine (NICODERM CQ) 21 mg/24 hr TD 24 hr patch Place 1 patch on the skin over 24 hours daily, Starting Mon 9/18/2023, Normal      OneTouch Ultra test strip Use 1 each daily as needed (before meals) Use as instructed, Starting Mon 9/18/2023, Normal      prazosin  "(MINIPRESS) 1 mg capsule TAKE ONE (1) CAPSULE ONCE A DAY AT BEDTIME FOR HYPERTENSION, Normal      rivaroxaban (Xarelto) 20 mg tablet TAKE 1 TABLET (20 MG TOTAL) BY MOUTH DAILY WITH BREAKFAST, Normal      Sure Comfort Insulin Syringe 31G X 5/16\" 0.3 ML MISC 2 (two) times a day Use as directed, Starting Thu 11/9/2023, Historical Med      topiramate (TOPAMAX) 25 mg tablet TAKE 1 TABLET (25 MG TOTAL) BY MOUTH 2 (TWO) TIMES A DAY, Starting Fri 10/6/2023, Normal       !! - Potential duplicate medications found. Please discuss with provider.        No discharge procedures on file.  ED SEPSIS DOCUMENTATION   Time reflects when diagnosis was documented in both MDM as applicable and the Disposition within this note       Time User Action Codes Description Comment    12/20/2024 12:17 AM Jose Sanabria Add [L03.311] Cellulitis of abdominal wall                  Jose Sanabria MD  12/20/24 0237    "

## 2024-12-27 ENCOUNTER — APPOINTMENT (EMERGENCY)
Dept: ULTRASOUND IMAGING | Facility: HOSPITAL | Age: 39
End: 2024-12-27
Payer: COMMERCIAL

## 2024-12-27 ENCOUNTER — HOSPITAL ENCOUNTER (EMERGENCY)
Facility: HOSPITAL | Age: 39
Discharge: HOME | End: 2024-12-27
Attending: EMERGENCY MEDICINE
Payer: COMMERCIAL

## 2024-12-27 ENCOUNTER — APPOINTMENT (EMERGENCY)
Dept: CT IMAGING | Facility: HOSPITAL | Age: 39
End: 2024-12-27
Payer: COMMERCIAL

## 2024-12-27 VITALS
HEART RATE: 81 BPM | OXYGEN SATURATION: 98 % | RESPIRATION RATE: 16 BRPM | TEMPERATURE: 98.6 F | DIASTOLIC BLOOD PRESSURE: 92 MMHG | SYSTOLIC BLOOD PRESSURE: 176 MMHG

## 2024-12-27 DIAGNOSIS — K76.0 FATTY LIVER DISEASE, NONALCOHOLIC: ICD-10-CM

## 2024-12-27 DIAGNOSIS — G44.209 TENSION HEADACHE: ICD-10-CM

## 2024-12-27 DIAGNOSIS — M25.511 ACUTE PAIN OF RIGHT SHOULDER: Primary | ICD-10-CM

## 2024-12-27 DIAGNOSIS — R10.11 RUQ PAIN: ICD-10-CM

## 2024-12-27 LAB
2HR DELTA HS TROPONIN: 0 NG/L
ALBUMIN SERPL BCG-MCNC: 3.9 G/DL (ref 3.5–5)
ALP SERPL-CCNC: 112 U/L (ref 34–104)
ALT SERPL W P-5'-P-CCNC: 18 U/L (ref 7–52)
ANION GAP SERPL CALCULATED.3IONS-SCNC: 9 MMOL/L (ref 4–13)
AST SERPL W P-5'-P-CCNC: 11 U/L (ref 13–39)
ATRIAL RATE: 80 BPM
ATRIAL RATE: 84 BPM
BACTERIA UR QL AUTO: ABNORMAL /HPF
BASOPHILS # BLD AUTO: 0.02 THOUSANDS/ÂΜL (ref 0–0.1)
BASOPHILS NFR BLD AUTO: 0 % (ref 0–1)
BILIRUB SERPL-MCNC: 0.3 MG/DL (ref 0.2–1)
BILIRUB UR QL STRIP: NEGATIVE
BUN SERPL-MCNC: 15 MG/DL (ref 5–25)
CALCIUM SERPL-MCNC: 9 MG/DL (ref 8.4–10.2)
CARDIAC TROPONIN I PNL SERPL HS: 3 NG/L (ref ?–50)
CARDIAC TROPONIN I PNL SERPL HS: 3 NG/L (ref ?–50)
CHLORIDE SERPL-SCNC: 100 MMOL/L (ref 96–108)
CLARITY UR: CLEAR
CO2 SERPL-SCNC: 25 MMOL/L (ref 21–32)
COLOR UR: YELLOW
CREAT SERPL-MCNC: 0.48 MG/DL (ref 0.6–1.3)
EOSINOPHIL # BLD AUTO: 0.09 THOUSAND/ÂΜL (ref 0–0.61)
EOSINOPHIL NFR BLD AUTO: 1 % (ref 0–6)
ERYTHROCYTE [DISTWIDTH] IN BLOOD BY AUTOMATED COUNT: 15.1 % (ref 11.6–15.1)
FLUAV AG UPPER RESP QL IA.RAPID: NEGATIVE
FLUBV AG UPPER RESP QL IA.RAPID: NEGATIVE
GFR SERPL CREATININE-BSD FRML MDRD: 124 ML/MIN/1.73SQ M
GLUCOSE SERPL-MCNC: 281 MG/DL (ref 65–140)
GLUCOSE UR STRIP-MCNC: ABNORMAL MG/DL
HCT VFR BLD AUTO: 40.2 % (ref 34.8–46.1)
HGB BLD-MCNC: 14.2 G/DL (ref 11.5–15.4)
HGB UR QL STRIP.AUTO: ABNORMAL
IMM GRANULOCYTES # BLD AUTO: 0.04 THOUSAND/UL (ref 0–0.2)
IMM GRANULOCYTES NFR BLD AUTO: 1 % (ref 0–2)
KETONES UR STRIP-MCNC: NEGATIVE MG/DL
LEUKOCYTE ESTERASE UR QL STRIP: NEGATIVE
LIPASE SERPL-CCNC: 13 U/L (ref 11–82)
LYMPHOCYTES # BLD AUTO: 2.27 THOUSANDS/ÂΜL (ref 0.6–4.47)
LYMPHOCYTES NFR BLD AUTO: 26 % (ref 14–44)
MCH RBC QN AUTO: 26 PG (ref 26.8–34.3)
MCHC RBC AUTO-ENTMCNC: 35.3 G/DL (ref 31.4–37.4)
MCV RBC AUTO: 74 FL (ref 82–98)
MONOCYTES # BLD AUTO: 0.38 THOUSAND/ÂΜL (ref 0.17–1.22)
MONOCYTES NFR BLD AUTO: 4 % (ref 4–12)
NEUTROPHILS # BLD AUTO: 5.94 THOUSANDS/ÂΜL (ref 1.85–7.62)
NEUTS SEG NFR BLD AUTO: 68 % (ref 43–75)
NITRITE UR QL STRIP: NEGATIVE
NON-SQ EPI CELLS URNS QL MICRO: ABNORMAL /HPF
NRBC BLD AUTO-RTO: 0 /100 WBCS
P AXIS: 27 DEGREES
P AXIS: 30 DEGREES
PH UR STRIP.AUTO: 6 [PH]
PLATELET # BLD AUTO: 427 THOUSANDS/UL (ref 149–390)
PMV BLD AUTO: 10.5 FL (ref 8.9–12.7)
POTASSIUM SERPL-SCNC: 3.7 MMOL/L (ref 3.5–5.3)
PR INTERVAL: 164 MS
PR INTERVAL: 172 MS
PROT SERPL-MCNC: 7.2 G/DL (ref 6.4–8.4)
PROT UR STRIP-MCNC: NEGATIVE MG/DL
QRS AXIS: 34 DEGREES
QRS AXIS: 43 DEGREES
QRSD INTERVAL: 78 MS
QRSD INTERVAL: 86 MS
QT INTERVAL: 354 MS
QT INTERVAL: 358 MS
QTC INTERVAL: 412 MS
QTC INTERVAL: 418 MS
RBC # BLD AUTO: 5.47 MILLION/UL (ref 3.81–5.12)
RBC #/AREA URNS AUTO: ABNORMAL /HPF
SARS-COV+SARS-COV-2 AG RESP QL IA.RAPID: NEGATIVE
SODIUM SERPL-SCNC: 134 MMOL/L (ref 135–147)
SP GR UR STRIP.AUTO: 1.01 (ref 1–1.03)
T WAVE AXIS: 0 DEGREES
T WAVE AXIS: 4 DEGREES
UROBILINOGEN UR QL STRIP.AUTO: 0.2 E.U./DL
VENTRICULAR RATE: 80 BPM
VENTRICULAR RATE: 84 BPM
WBC # BLD AUTO: 8.74 THOUSAND/UL (ref 4.31–10.16)
WBC #/AREA URNS AUTO: ABNORMAL /HPF

## 2024-12-27 PROCEDURE — 96361 HYDRATE IV INFUSION ADD-ON: CPT

## 2024-12-27 PROCEDURE — 83690 ASSAY OF LIPASE: CPT | Performed by: EMERGENCY MEDICINE

## 2024-12-27 PROCEDURE — 36415 COLL VENOUS BLD VENIPUNCTURE: CPT | Performed by: EMERGENCY MEDICINE

## 2024-12-27 PROCEDURE — 81001 URINALYSIS AUTO W/SCOPE: CPT | Performed by: EMERGENCY MEDICINE

## 2024-12-27 PROCEDURE — 80053 COMPREHEN METABOLIC PANEL: CPT | Performed by: EMERGENCY MEDICINE

## 2024-12-27 PROCEDURE — 93005 ELECTROCARDIOGRAM TRACING: CPT

## 2024-12-27 PROCEDURE — 99285 EMERGENCY DEPT VISIT HI MDM: CPT | Performed by: EMERGENCY MEDICINE

## 2024-12-27 PROCEDURE — 76705 ECHO EXAM OF ABDOMEN: CPT

## 2024-12-27 PROCEDURE — 81003 URINALYSIS AUTO W/O SCOPE: CPT | Performed by: EMERGENCY MEDICINE

## 2024-12-27 PROCEDURE — 87804 INFLUENZA ASSAY W/OPTIC: CPT | Performed by: EMERGENCY MEDICINE

## 2024-12-27 PROCEDURE — 96376 TX/PRO/DX INJ SAME DRUG ADON: CPT

## 2024-12-27 PROCEDURE — 87811 SARS-COV-2 COVID19 W/OPTIC: CPT | Performed by: EMERGENCY MEDICINE

## 2024-12-27 PROCEDURE — 84484 ASSAY OF TROPONIN QUANT: CPT | Performed by: EMERGENCY MEDICINE

## 2024-12-27 PROCEDURE — 85025 COMPLETE CBC W/AUTO DIFF WBC: CPT | Performed by: EMERGENCY MEDICINE

## 2024-12-27 PROCEDURE — 99284 EMERGENCY DEPT VISIT MOD MDM: CPT

## 2024-12-27 PROCEDURE — 70450 CT HEAD/BRAIN W/O DYE: CPT

## 2024-12-27 PROCEDURE — 96374 THER/PROPH/DIAG INJ IV PUSH: CPT

## 2024-12-27 RX ORDER — KETOROLAC TROMETHAMINE 30 MG/ML
15 INJECTION, SOLUTION INTRAMUSCULAR; INTRAVENOUS ONCE
Status: COMPLETED | OUTPATIENT
Start: 2024-12-27 | End: 2024-12-27

## 2024-12-27 RX ORDER — ACETAMINOPHEN 325 MG/1
975 TABLET ORAL ONCE
Status: COMPLETED | OUTPATIENT
Start: 2024-12-27 | End: 2024-12-27

## 2024-12-27 RX ADMIN — KETOROLAC TROMETHAMINE 15 MG: 30 INJECTION, SOLUTION INTRAMUSCULAR at 18:44

## 2024-12-27 RX ADMIN — KETOROLAC TROMETHAMINE 15 MG: 30 INJECTION, SOLUTION INTRAMUSCULAR at 17:43

## 2024-12-27 RX ADMIN — SODIUM CHLORIDE 1000 ML: 0.9 INJECTION, SOLUTION INTRAVENOUS at 19:42

## 2024-12-27 RX ADMIN — ACETAMINOPHEN 975 MG: 325 TABLET, FILM COATED ORAL at 17:43

## 2024-12-27 NOTE — ED PROVIDER NOTES
Time reflects when diagnosis was documented in both MDM as applicable and the Disposition within this note       Time User Action Codes Description Comment    12/27/2024  9:11 PM Nabeel Gil [M25.511] Acute pain of right shoulder     12/27/2024  9:11 PM Nabeel Gil [R10.11] RUQ pain     12/27/2024  9:12 PM Nabeel Gil [G44.209] Tension headache     12/27/2024  9:23 PM Nabeel Gil [K76.0] Fatty liver disease, nonalcoholic           ED Disposition       ED Disposition   Discharge    Condition   Stable    Date/Time   Fri Dec 27, 2024  9:11 PM    Comment   Tony COLLADO Pardeep discharge to home/self care.                   Assessment & Plan       Medical Decision Making  39-year-old female presents with a posterior occipital headache, right shoulder pain, which with it being worse with movement is possibly musculoskeletal in nature, however with her also having right upper quadrant abdominal pain I do have some suspicion for possible referred pain from an intra-abdominal process, such as a cholecystitis.  The patient will be evaluated for cholecystitis and if unremarkable may be evaluated CT for other intra-abdominal pathology, as her headache is described as a new headache that is worse than any previous headaches and sudden and severe in nature, and being on Xarelto, she will be evaluated for possible intracranial hemorrhage or other intracranial pathology.  She has had no recent trauma and my suspicion for any fracture or other traumatic injury is extremely low and I do not believe that she needs a CT cervical spine at this time as she has no midline pain and no other red flags, and no actual glenohumeral deformities or swelling or other signs of injury.  If her workup is unremarkable she will likely be discharged home with follow-up with primary care as an outpatient.    The patient's ultrasound is unremarkable, the patient is feeling better, she is tolerating p.o.  intake well, my suspicion for any intra-abdominal pathology at this time is very low, she was encouraged to follow-up with primary care for further management of her myalgias.  She will also be encouraged to follow-up with GI for further management of her fatty liver disease found on her ultrasound.    Disposition: Patient stable for outpatient management. Discussed need for follow up with their primary doctor or specialist to review all results, including incidental findings as below. Patient discharged with explanation of ED workup and diagnosis, instructions on how to obtain outpatient follow up, care instructions at home, and strict return precautions if patient develops new or worsening symptoms. Patients questions answered and agreeable with discharge plan.        PLEASE NOTE:  This encounter was completed utilizing the [employer] Speech Voice Recognition Software. Grammatical errors, random word insertions, pronoun errors and incomplete sentences are occasional inherent consequences of the system due to software limitations, ambient noise and hardware issues.These may be missed by proof reading prior to affixing electronic signature. Any questions or concerns about the content, text or information contained within the body of this dictation should be directly addressed to the physician for clarification. Please do not hesitate to call me directly if you have any questions or concerns.      Amount and/or Complexity of Data Reviewed  Labs: ordered.  Radiology: ordered.    Risk  OTC drugs.  Prescription drug management.             Medications   acetaminophen (TYLENOL) tablet 975 mg (975 mg Oral Given 12/27/24 1743)   ketorolac (TORADOL) injection 15 mg (15 mg Intravenous Given 12/27/24 1743)   ketorolac (TORADOL) injection 15 mg (15 mg Intravenous Given 12/27/24 1844)   sodium chloride 0.9 % bolus 1,000 mL (0 mL Intravenous Stopped 12/27/24 2118)       ED Risk Strat Scores            History of  "Present Illness       Chief Complaint   Patient presents with    Medical Problem     Patient arrives to the Ed with complaint of right shoulder and neck pain. Patient states she got very dizzy, her head started hurting and then her right shoulder. Patient states that she took tylenol 3 hours ago. \" I don't know what happened\"       Past Medical History:   Diagnosis Date    Asthma     Atherosclerosis     Bipolar 1 disorder (HCC)     COPD (chronic obstructive pulmonary disease) (HCC)     Depression     Diabetes mellitus (HCC)     Hypertension     Psychiatric disorder     cutting history    PTSD (post-traumatic stress disorder)     Schizoaffective disorder (HCC)     Tendonitis       Past Surgical History:   Procedure Laterality Date    AMPUTATION ABOVE KNEE (AKA) Left 3/29/2023    Procedure: AMPUTATION ABOVE KNEE (AKA);  Surgeon: Savi Langford MD;  Location: [facility];  Service: Vascular    BYPASS FEMORAL-POPLITEAL Left 2021    Procedure: Left Common Femoral Below Knee to Popliteal Bypass with Insitu GSV graft.  Left Lower Extremity Angiogram;  Surgeon: Savi Langford MD;  Location: [facility];  Service: Vascular     SECTION      EAR SURGERY      FL LUMBAR PUNCTURE DIAGNOSTIC  10/25/2018    IR LOWER EXTREMITY ANGIOGRAM  2021    IR LOWER EXTREMITY ANGIOGRAM  2021    CO SLCTV CATHJ 3RD+ ORD SLCTV ABDL PEL/LXTR BRNCH Left 3/24/2023    Procedure: Left leg angiogram;  Surgeon: Byron Wahl DO;  Location: [facility];  Service: Vascular    TUBAL LIGATION        Family History   Problem Relation Age of Onset    Hypertension Mother     Diabetes Mother     HIV Mother     Heart disease Mother     No Known Problems Father       Social History     Tobacco Use    Smoking status: Every Day     Current packs/day: 0.50     Average packs/day: 1 pack/day for 21.6 years (20.8 ttl pk-yrs)     Types: Cigarettes     Start date: 3/24/2003     Last attempt to quit: 3/24/2023     Passive exposure: Current    Smokeless " "tobacco: Former     Quit date: 4/29/2022   Vaping Use    Vaping status: Never Used   Substance Use Topics    Alcohol use: Not Currently     Comment: not currently    Drug use: Not Currently     Types: Cocaine, Marijuana, \"Crack\" cocaine     Comment: 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     39-year-old female presents with 1 day of significant headache that she describes as occipital pain, that radiates into her right shoulder, worse when she moves her shoulder, and she also notes that she has had some right upper quadrant abdominal pain, she denies any fevers, she does endorse some dizziness described as vertigo, but has not had any focal weakness or deficits, no facial droop or difficulty speaking, no actual chest pain, she has a chronic smoker's cough but nothing worse than usual, and is not feeling short of breath, she has had some nausea but no vomiting, and no constipation or diarrhea, dysuria, hematuria, she notes that she is currently 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        Review of Systems   Constitutional:  Negative for fever.   Respiratory:  Positive for cough. Negative for shortness of breath.    Cardiovascular:  Negative for chest pain.   Gastrointestinal:  Positive for abdominal pain and nausea. Negative for constipation, diarrhea and vomiting.   Genitourinary:  Negative for dysuria and hematuria.   Musculoskeletal:  Positive for arthralgias and myalgias.   Neurological:  Positive for headaches. Negative for light-headedness.           Objective       ED Triage Vitals   Temperature Pulse Blood Pressure Respirations SpO2 Patient Position - Orthostatic VS   12/27/24 1631 " 12/27/24 1631 12/27/24 1631 12/27/24 1631 12/27/24 1631 12/27/24 1631   98.3 °F (36.8 °C) 81 (!) 174/111 18 99 % Lying      Temp Source Heart Rate Source BP Location FiO2 (%) Pain Score    12/27/24 1631 12/27/24 1631 12/27/24 1631 -- 12/27/24 1911    Oral Monitor Right arm  10 - Worst Possible Pain      Vitals      Date and Time Temp Pulse SpO2 Resp BP Pain Score FACES Pain Rating User   12/27/24 1911 98.6 °F (37 °C) 81 98 % 16 176/92 10 - Worst Possible Pain -- REINIER   12/27/24 1631 98.3 °F (36.8 °C) 81 99 % 18 174/111 -- -- REINIER            Physical Exam  Vitals and nursing note reviewed.   Constitutional:       General: She is not in acute distress.     Appearance: Normal appearance. She is well-developed. She is obese.   HENT:      Head: Normocephalic and atraumatic.      Comments: No significant midline tenderness to palpation of the cervical spine, she has tenderness to palpation of the right side of the neck and the right posterior shoulder that is worse with movement of the right shoulder, Spurling sign is negative.  Eyes:      Extraocular Movements: Extraocular movements intact.      Conjunctiva/sclera: Conjunctivae normal.   Cardiovascular:      Rate and Rhythm: Normal rate and regular rhythm.   Pulmonary:      Effort: Pulmonary effort is normal. No respiratory distress.      Breath sounds: Normal breath sounds.   Abdominal:      General: There is no distension.      Palpations: Abdomen is soft.      Tenderness: There is no abdominal tenderness.   Musculoskeletal:         General: No swelling.      Cervical back: Normal range of motion.   Skin:     General: Skin is warm and dry.      Capillary Refill: Capillary refill takes less than 2 seconds.   Neurological:      General: No focal deficit present.      Mental Status: She is alert and oriented to person, place, and time.   Psychiatric:         Mood and Affect: Mood normal.         Results Reviewed       Procedure Component Value Units Date/Time    HS Troponin I  2hr [[id number]]  (Normal) Collected: 12/27/24 1928    Lab Status: Final result Specimen: Blood from Arm, Left Updated: 12/27/24 1954     hs TnI 2hr 3 ng/L      Delta 2hr hsTnI 0 ng/L     FLU/COVID Rapid Antigen (30 min. TAT) - Preferred screening test in ED [[id number]]  (Normal) Collected: 12/27/24 1922    Lab Status: Final result Specimen: Nares from Nose Updated: 12/27/24 1948     SARS COV Rapid Antigen Negative     Influenza A Rapid Antigen Negative     Influenza B Rapid Antigen Negative    Narrative:      This test has been performed using the [employer] Argenis 2 FLU+SARS Antigen test under the Emergency Use Authorization (EUA). This test has been validated by the  and verified by the performing laboratory. The Argenis uses lateral flow immunofluorescent sandwich assay to detect SARS-COV, Influenza A and Influenza B Antigen.     The [employer] Argenis 2 SARS Antigen test does not differentiate between SARS-CoV and SARS-CoV-2.     Negative results are presumptive and may be confirmed with a molecular assay, if necessary, for patient management. Negative results do not rule out SARS-CoV-2 or influenza infection and should not be used as the sole basis for treatment or patient management decisions. A negative test result may occur if the level of antigen in a sample is below the limit of detection of this test.     Positive results are indicative of the presence of viral antigens, but do not rule out bacterial infection or co-infection with other viruses.     All test results should be used as an adjunct to clinical observations and other information available to the provider.    FOR PEDIATRIC PATIENTS - copy/paste COVID Guidelines URL to browser: [email or web address]    HS Troponin 0hr (reflex protocol) [[id number]]  (Normal) Collected: 12/27/24 1742    Lab Status: Final result Specimen: Blood from Arm, Left Updated: 12/27/24 1818     hs TnI 0hr 3 ng/L      Comprehensive metabolic panel [[id number]]  (Abnormal) Collected: 12/27/24 1742    Lab Status: Final result Specimen: Blood from Arm, Left Updated: 12/27/24 1814     Sodium 134 mmol/L      Potassium 3.7 mmol/L      Chloride 100 mmol/L      CO2 25 mmol/L      ANION GAP 9 mmol/L      BUN 15 mg/dL      Creatinine 0.48 mg/dL      Glucose 281 mg/dL      Calcium 9.0 mg/dL      AST 11 U/L      ALT 18 U/L      Alkaline Phosphatase 112 U/L      Total Protein 7.2 g/dL      Albumin 3.9 g/dL      Total Bilirubin 0.30 mg/dL      eGFR 124 ml/min/1.73sq m     Narrative:      National Kidney Disease Foundation guidelines for Chronic Kidney Disease (CKD):     Stage 1 with normal or high GFR (GFR > 90 mL/min/1.73 square meters)    Stage 2 Mild CKD (GFR = 60-89 mL/min/1.73 square meters)    Stage 3A Moderate CKD (GFR = 45-59 mL/min/1.73 square meters)    Stage 3B Moderate CKD (GFR = 30-44 mL/min/1.73 square meters)    Stage 4 Severe CKD (GFR = 15-29 mL/min/1.73 square meters)    Stage 5 End Stage CKD (GFR <15 mL/min/1.73 square meters)  Note: GFR calculation is accurate only with a steady state creatinine    Lipase [[id number]]  (Normal) Collected: 12/27/24 1742    Lab Status: Final result Specimen: Blood from Arm, Left Updated: 12/27/24 1814     Lipase 13 u/L     Urine Microscopic [[id number]]  (Abnormal) Collected: 12/27/24 1747    Lab Status: Final result Specimen: Urine, Clean Catch Updated: 12/27/24 1805     RBC, UA 2-4 /hpf      WBC, UA 1-2 /hpf      Epithelial Cells Moderate /hpf      Bacteria, UA Occasional /hpf     CBC and differential [[id number]]  (Abnormal) Collected: 12/27/24 1742    Lab Status: Final result Specimen: Blood from Arm, Left Updated: 12/27/24 1801     WBC 8.74 Thousand/uL      RBC 5.47 Million/uL      Hemoglobin 14.2 g/dL      Hematocrit 40.2 %      MCV 74 fL      MCH 26.0 pg      MCHC 35.3 g/dL      RDW 15.1 %      MPV 10.5 fL      Platelets 427 Thousands/uL      nRBC 0 /100 WBCs      Segmented % 68 %       Immature Grans % 1 %      Lymphocytes % 26 %      Monocytes % 4 %      Eosinophils Relative 1 %      Basophils Relative 0 %      Absolute Neutrophils 5.94 Thousands/µL      Absolute Immature Grans 0.04 Thousand/uL      Absolute Lymphocytes 2.27 Thousands/µL      Absolute Monocytes 0.38 Thousand/µL      Eosinophils Absolute 0.09 Thousand/µL      Basophils Absolute 0.02 Thousands/µL     UA (URINE) with reflex to Scope [[id number]]  (Abnormal) Collected: 12/27/24 1747    Lab Status: Final result Specimen: Urine, Clean Catch Updated: 12/27/24 1756     Color, UA Yellow     Clarity, UA Clear     Specific Gravity, UA 1.010     pH, UA 6.0     Leukocytes, UA Negative     Nitrite, UA Negative     Protein, UA Negative mg/dl      Glucose, UA 3+ mg/dl      Ketones, UA Negative mg/dl      Urobilinogen, UA 0.2 E.U./dl      Bilirubin, UA Negative     Occult Blood, UA 2+            US right upper quadrant   Final Interpretation by Hari Richards DO (12/27 2059)      No acute findings.      Fatty liver.      Workstation performed: [id number]         CT head without contrast   Final Interpretation by Pallav N Shah, MD (12/27 1844)      No acute intracranial abnormality. Chronic left gangliocapsular and thalamic infarctions.                  Workstation performed: [id number]             ECG 12 Lead Documentation Only    Date/Time: 12/27/2024 5:53 PM    Performed by: Nabeel Gil MD  Authorized by: Nabeel Gil MD    ECG reviewed by me, the ED Provider: yes    Patient location:  ED  Previous ECG:     Previous ECG:  Compared to current    Similarity:  No change  Interpretation:     Interpretation: normal    Rate:     ECG rate:  84    ECG rate assessment: normal    Rhythm:     Rhythm: sinus rhythm    Ectopy:     Ectopy: none    QRS:     QRS axis:  Normal    QRS intervals:  Normal  Conduction:     Conduction: normal    ST segments:     ST segments:  Normal  T waves:     T waves: normal        ED Medication and  "Procedure Management   Prior to Admission Medications   Prescriptions Last Dose Informant Patient Reported? Taking?   Advair -21 MCG/ACT inhaler  Self No No   Sig: Inhale 2 puffs 2 (two) times a day Rinse mouth after use   Alcohol Swabs (Pharmacist Choice Alcohol) PADS  Self No No   Sig: USE 3-4 TIMES AS DIRECTED.   B-D INS SYR MICROFINE .5CC/28G 28G X 1/2\" 0.5 ML MISC  Self No No   Sig: USE AS DIRECTED   Blood Glucose Monitoring Suppl (ONE TOUCH ULTRA 2) w/Device KIT  Self No No   Sig: BY DEVICE ROUTE 2 (TWO) TIMES A DAY CHECK BLOOD SUGARS AND RECORD VALUE AND TIME OF DAY TWICE A DAY   Blood Pressure Monitoring (Blood Pressure Monitor Automat) KATLYN  Self No No   Sig: Use Daily as Directed   Patient not taking: Reported on 2024   Comfort EZ Pen Needles 33G X 4 MM MISC  Self No No   Sig: USE AS DIRECTED   DULoxetine (CYMBALTA) 30 mg delayed release capsule   No No   Sig: TAKE 2 CAPSULES (60 MG TOTAL) BY MOUTH DAILY   Dulaglutide (Trulicity) 1.5 MG/0.5ML SOAJ   No No   Sig: AS DIRECTED WEEKLY   Global Inject Ease Lancets 30G MISC  Self No No   Sig: USE 3 (THREE) TIMES A DAY   Insulin Pen Needle (BD Pen Needle Leann 2nd Gen) 32G X 4 MM MISC   No No   Sig: INJECT UNDER THE SKIN 4 (FOUR) TIMES A DAY (BEFORE MEALS AND AT BEDTIME)   Lantus 100 UNIT/ML subcutaneous injection  Self No No   Sig: INJECT 30 UNITS UNDER THE SKIN EVERY 12 (TWELVE) HOURS   OneTouch Ultra test strip  Self No No   Sig: Use 1 each daily as needed (before meals) Use as instructed   Sure Comfort Insulin Syringe 31G X 5/16\" 0.3 ML MISC  Self Yes No   Si (two) times a day Use as directed   acetaminophen (TYLENOL) 500 mg tablet   No No   Sig: Take 2 tablets (1,000 mg total) by mouth every 8 (eight) hours as needed for mild pain   albuterol (PROVENTIL HFA,VENTOLIN HFA) 90 mcg/act inhaler   No No   Sig: INHALE 2 PUFFS EVERY 4 HOURS AS NEEDED FOR WHEEZING OR SHORTNESS OF BREATH   aspirin 81 mg chewable tablet   No No   Sig: CHEW 1 TABLET (81 " MG TOTAL) DAILY   atorvastatin (LIPITOR) 40 mg tablet  Self No No   Sig: TAKE 1 TABLET (40 MG TOTAL) BY MOUTH DAILY WITH DINNER   cephalexin (KEFLEX) 250 mg capsule   No No   Sig: Take 2 capsules (500 mg total) by mouth 4 (four) times a day for 7 days   dulaglutide (Trulicity) 0.75 MG/0.5ML injection  Self No No   Sig: Inject 0.5 mL (0.75 mg total) under the skin every 7 days   glycerin-hypromellose- (ARTIFICIAL TEARS) 0.2-0.2-1 % SOLN  Self No No   Sig: Administer 2 drops to both eyes every 6 (six) hours as needed (for eye discomfort)   hydrOXYzine HCL (ATARAX) 50 mg tablet  Self No No   Sig: TAKE 1 TABLET (50 MG TOTAL) BY MOUTH DAILY AT BEDTIME AS NEEDED FOR ITCHING OR ANXIETY   ibuprofen (MOTRIN) 600 mg tablet   No No   Sig: Take 1 tablet (600 mg total) by mouth every 8 (eight) hours as needed for mild pain   insulin lispro (HumALOG/ADMELOG) 100 units/mL injection  Self No No   Sig: INJECT 4-16 UNITS UNDER THE SKIN 3 (THREE) TIMES A DAY BEFORE MEALS   ketotifen (ZADITOR) 0.025 % ophthalmic solution  Self No No   Sig: Administer 1 drop to both eyes 2 (two) times a day as needed (for eye discomfort)   lisinopril (ZESTRIL) 20 mg tablet  Self No No   Sig: TAKE 1 TABLET (20 MG TOTAL) BY MOUTH DAILY   magnesium chloride-calcium (Slow-Mag) 71.5-119 mg   No No   Sig: Take 2 tablets by mouth daily for 7 days   metFORMIN (GLUCOPHAGE) 1000 MG tablet   No No   Sig: TAKE ONE (1) TABLET BY MOUTH TWICE DAILY WITH FOOD   naproxen (Naprosyn) 500 mg tablet   No No   Sig: Take 1 tablet (500 mg total) by mouth 2 (two) times a day with meals for 7 days   nicotine (NICODERM CQ) 21 mg/24 hr TD 24 hr patch  Self No No   Sig: Place 1 patch on the skin over 24 hours daily   Patient not taking: Reported on 9/11/2024   prazosin (MINIPRESS) 1 mg capsule   No No   Sig: TAKE ONE (1) CAPSULE ONCE A DAY AT BEDTIME FOR HYPERTENSION   rivaroxaban (Xarelto) 20 mg tablet   No No   Sig: TAKE 1 TABLET (20 MG TOTAL) BY MOUTH DAILY WITH BREAKFAST    topiramate (TOPAMAX) 25 mg tablet  Self No No   Sig: TAKE 1 TABLET (25 MG TOTAL) BY MOUTH 2 (TWO) TIMES A DAY      Facility-Administered Medications: None     Patient's Medications   Discharge Prescriptions    No medications on file     No discharge procedures on file.  ED SEPSIS DOCUMENTATION   Time reflects when diagnosis was documented in both MDM as applicable and the Disposition within this note       Time User Action Codes Description Comment    12/27/2024  9:11 PM Nabeel Gil [M25.511] Acute pain of right shoulder     12/27/2024  9:11 PM Nabeel Gil [R10.11] RUQ pain     12/27/2024  9:12 PM Nabeel Gil [G44.209] Tension headache     12/27/2024  9:23 PM Nabeel Gil [K76.0] Fatty liver disease, nonalcoholic                  Nabeel Gil MD  12/27/24 2839

## 2024-12-30 LAB
ATRIAL RATE: 80 BPM
ATRIAL RATE: 84 BPM
P AXIS: 27 DEGREES
P AXIS: 30 DEGREES
PR INTERVAL: 164 MS
PR INTERVAL: 172 MS
QRS AXIS: 34 DEGREES
QRS AXIS: 43 DEGREES
QRSD INTERVAL: 78 MS
QRSD INTERVAL: 86 MS
QT INTERVAL: 354 MS
QT INTERVAL: 358 MS
QTC INTERVAL: 412 MS
QTC INTERVAL: 418 MS
T WAVE AXIS: 0 DEGREES
T WAVE AXIS: 4 DEGREES
VENTRICULAR RATE: 80 BPM
VENTRICULAR RATE: 84 BPM

## 2024-12-30 PROCEDURE — 93010 ELECTROCARDIOGRAM REPORT: CPT | Performed by: INTERNAL MEDICINE

## 2025-01-09 ENCOUNTER — TELEPHONE (OUTPATIENT)
Age: 40
End: 2025-01-09

## 2025-01-09 NOTE — TELEPHONE ENCOUNTER
Shakira with [facility] -[phone] will be sending a fax regarding the patient's COPD, to see if medication is something she needs, because she said the patient does not appear to receviing medication for it.

## 2025-01-10 ENCOUNTER — TELEPHONE (OUTPATIENT)
Dept: FAMILY MEDICINE CLINIC | Facility: CLINIC | Age: 40
End: 2025-01-10

## 2025-01-10 NOTE — TELEPHONE ENCOUNTER
Encounter for forms      Patient requires a form to be completed.  Patient is aware of 7-10 day turn around time.    Please refer to the following information:       Type of Form: [employer] drug therapy request     Date of Visit (if applicable):     Doctor: dr blair     Expected date: with in two days as requested     How patient would like to receive form:please when signed fax    Fax number:  [phone]        Original in (X) team folder to be completed.

## 2025-01-14 NOTE — TELEPHONE ENCOUNTER
Continued Stay Note  [facility]     Patient Name: Dayron Lubin  MRN: [id number]  Today's Date: 8/15/2023    Admit Date: 8/7/2023    Plan: [facility]   Discharge Plan       Row Name 08/15/23 1350       Plan    Plan [facility]    Final Discharge Disposition Code 03 - skilled nursing facility (SNF)    Final Note Charles has approved admission to [facility]. RICK notified Mary Grace in admissions there and faxed approval letter to [facility] at [phone].  notified Dr. Karimi. Discharge summary will be faxed to same number when ready. RN report [phone].               Expected Discharge Date and Time       Expected Discharge Date Expected Discharge Time    Aug 15, 2023               CHUCK Verma     Faxed over the pw and scanned it in to media

## 2025-01-28 ENCOUNTER — HOSPITAL ENCOUNTER (EMERGENCY)
Facility: HOSPITAL | Age: 40
Discharge: HOME | End: 2025-01-28
Attending: EMERGENCY MEDICINE | Admitting: EMERGENCY MEDICINE
Payer: COMMERCIAL

## 2025-01-28 ENCOUNTER — APPOINTMENT (EMERGENCY)
Dept: RADIOLOGY | Facility: HOSPITAL | Age: 40
End: 2025-01-28
Payer: COMMERCIAL

## 2025-01-28 VITALS
WEIGHT: 236.33 LBS | HEART RATE: 81 BPM | RESPIRATION RATE: 18 BRPM | SYSTOLIC BLOOD PRESSURE: 158 MMHG | OXYGEN SATURATION: 99 % | TEMPERATURE: 97.8 F | BODY MASS INDEX: 44.65 KG/M2 | DIASTOLIC BLOOD PRESSURE: 93 MMHG

## 2025-01-28 DIAGNOSIS — M54.6 ACUTE THORACIC BACK PAIN: Primary | ICD-10-CM

## 2025-01-28 PROCEDURE — 99283 EMERGENCY DEPT VISIT LOW MDM: CPT

## 2025-01-28 PROCEDURE — 99284 EMERGENCY DEPT VISIT MOD MDM: CPT | Performed by: EMERGENCY MEDICINE

## 2025-01-28 PROCEDURE — 96372 THER/PROPH/DIAG INJ SC/IM: CPT

## 2025-01-28 PROCEDURE — 72070 X-RAY EXAM THORAC SPINE 2VWS: CPT

## 2025-01-28 RX ORDER — METHOCARBAMOL 500 MG/1
500 TABLET, FILM COATED ORAL ONCE
Status: COMPLETED | OUTPATIENT
Start: 2025-01-28 | End: 2025-01-28

## 2025-01-28 RX ORDER — LIDOCAINE 50 MG/G
1 PATCH TOPICAL DAILY
Qty: 5 PATCH | Refills: 0 | Status: SHIPPED | OUTPATIENT
Start: 2025-01-28

## 2025-01-28 RX ORDER — LIDOCAINE 50 MG/G
1 PATCH TOPICAL ONCE
Status: DISCONTINUED | OUTPATIENT
Start: 2025-01-28 | End: 2025-01-28 | Stop reason: HOSPADM

## 2025-01-28 RX ORDER — METHOCARBAMOL 500 MG/1
500 TABLET, FILM COATED ORAL 2 TIMES DAILY
Qty: 20 TABLET | Refills: 0 | Status: SHIPPED | OUTPATIENT
Start: 2025-01-28

## 2025-01-28 RX ORDER — KETOROLAC TROMETHAMINE 10 MG/1
10 TABLET, FILM COATED ORAL EVERY 6 HOURS PRN
Qty: 20 TABLET | Refills: 0 | Status: SHIPPED | OUTPATIENT
Start: 2025-01-28

## 2025-01-28 RX ORDER — KETOROLAC TROMETHAMINE 30 MG/ML
30 INJECTION, SOLUTION INTRAMUSCULAR; INTRAVENOUS ONCE
Status: COMPLETED | OUTPATIENT
Start: 2025-01-28 | End: 2025-01-28

## 2025-01-28 RX ORDER — KETOROLAC TROMETHAMINE 10 MG/1
10 TABLET, FILM COATED ORAL EVERY 6 HOURS PRN
Qty: 20 TABLET | Refills: 0 | Status: SHIPPED | OUTPATIENT
Start: 2025-01-28 | End: 2025-01-28

## 2025-01-28 RX ADMIN — LIDOCAINE 1 PATCH: 50 PATCH TOPICAL at 20:51

## 2025-01-28 RX ADMIN — METHOCARBAMOL TABLETS 500 MG: 500 TABLET, COATED ORAL at 20:52

## 2025-01-28 RX ADMIN — KETOROLAC TROMETHAMINE 30 MG: 30 INJECTION, SOLUTION INTRAMUSCULAR at 20:53

## 2025-01-29 DIAGNOSIS — I73.9 PAD (PERIPHERAL ARTERY DISEASE) (HCC): ICD-10-CM

## 2025-01-29 NOTE — ED NOTES
Eulalia arranged per patient request. Patient transported into the , awaiting her ride home. Patient is AAOX4 resp even and unlabored and in no distress. Accompanied by family.      Yoseph Marquez RN  01/28/25 [phone]

## 2025-01-29 NOTE — ED PROVIDER NOTES
Time reflects when diagnosis was documented in both MDM as applicable and the Disposition within this note       Time User Action Codes Description Comment    1/28/2025  9:22 PM Manuela Bradford [M54.6] Acute thoracic back pain           ED Disposition       ED Disposition   Discharge    Condition   Stable    Date/Time   Tue Jan 28, 2025  9:22 PM    Comment   Tony Roberto discharge to home/self care.                   Assessment & Plan       Medical Decision Making  Differential diagnosis includes but is not limited to musculoskeletal back pain, arthritis, compression fracture, pneumothorax    Amount and/or Complexity of Data Reviewed  Radiology: ordered and independent interpretation performed.     Details: No compression fracture or pneumothorax appreciated    Risk  Prescription drug management.             Medications   lidocaine (LIDODERM) 5 % patch 1 patch (1 patch Topical Medication Applied 1/28/25 2051)   methocarbamol (ROBAXIN) tablet 500 mg (500 mg Oral Given 1/28/25 2052)   ketorolac (TORADOL) injection 30 mg (30 mg Intramuscular Given 1/28/25 2053)       ED Risk Strat Scores                          SBIRT 20yo+      Flowsheet Row Most Recent Value   Initial Alcohol Screen: US AUDIT-C     1. How often do you have a drink containing alcohol? 0 Filed at: 01/28/2025 2036   2. How many drinks containing alcohol do you have on a typical day you are drinking?  0 Filed at: 01/28/2025 2036   3b. FEMALE Any Age, or MALE 65+: How often do you have 4 or more drinks on one occassion? 0 Filed at: 01/28/2025 2036   Audit-C Score 0 Filed at: 01/28/2025 2036   CRISTÓBAL: How many times in the past year have you...    Used an illegal drug or used a prescription medication for non-medical reasons? Never Filed at: 01/28/2025 2036                            History of Present Illness       Chief Complaint   Patient presents with    Back Pain     Patient in the ED ginette mid back pain since 2 hrs ago. It feels like someone  "is pulling it. It just hurst. Denies any trauma.        Past Medical History:   Diagnosis Date    Asthma     Atherosclerosis     Bipolar 1 disorder (HCC)     COPD (chronic obstructive pulmonary disease) (HCC)     Depression     Diabetes mellitus (HCC)     Hypertension     Psychiatric disorder     cutting history    PTSD (post-traumatic stress disorder)     Schizoaffective disorder (HCC)     Tendonitis       Past Surgical History:   Procedure Laterality Date    AMPUTATION ABOVE KNEE (AKA) Left 3/29/2023    Procedure: AMPUTATION ABOVE KNEE (AKA);  Surgeon: Savi Langford MD;  Location: [facility];  Service: Vascular    BYPASS FEMORAL-POPLITEAL Left 2021    Procedure: Left Common Femoral Below Knee to Popliteal Bypass with Insitu GSV graft.  Left Lower Extremity Angiogram;  Surgeon: Savi Langford MD;  Location: [facility];  Service: Vascular     SECTION      EAR SURGERY      FL LUMBAR PUNCTURE DIAGNOSTIC  10/25/2018    IR LOWER EXTREMITY ANGIOGRAM  2021    IR LOWER EXTREMITY ANGIOGRAM  2021    MA SLCTV CATHJ 3RD+ ORD SLCTV ABDL PEL/LXTR BRNCH Left 3/24/2023    Procedure: Left leg angiogram;  Surgeon: Byron Wahl DO;  Location: [facility];  Service: Vascular    TUBAL LIGATION        Family History   Problem Relation Age of Onset    Hypertension Mother     Diabetes Mother     HIV Mother     Heart disease Mother     No Known Problems Father       Social History     Tobacco Use    Smoking status: Every Day     Current packs/day: 0.50     Average packs/day: 1 pack/day for 21.7 years (20.8 ttl pk-yrs)     Types: Cigarettes     Start date: 3/24/2003     Last attempt to quit: 3/24/2023     Passive exposure: Current    Smokeless tobacco: Former     Quit date: 2022   Vaping Use    Vaping status: Never Used   Substance Use Topics    Alcohol use: Not Currently     Comment: not currently    Drug use: Not Currently     Types: Cocaine, Marijuana, \"Crack\" cocaine     Comment: 1 years clean from crack " cocaine since 2022      E-Cigarette/Vaping    E-Cigarette Use Never User       E-Cigarette/Vaping Substances    Nicotine No     THC No     CBD No     Flavoring No     Other No     Unknown No       I have reviewed and agree with the history as documented.     39-year-old female comes in for evaluation of back pain.  Patient states approximately 2 hours ago she began to have stabbing pain in the right side of her mid thoracic spine.  Patient states she has had pain like this before took over-the-counter medication without relief.  Patient states she has been on Toradol in the past that has helped her.  She is out of those pills now.  No new injury that she is aware      History provided by:  Patient and medical records   used: No    Back Pain  Location:  Thoracic spine  Quality:  Burning and stabbing  Radiates to:  Does not radiate  Pain severity:  Severe  Pain is:  Same all the time  Onset quality:  Sudden  Duration:  2 hours  Timing:  Constant  Progression:  Worsening  Chronicity:  New  Ineffective treatments:  OTC medications  Associated symptoms: no abdominal pain, no bladder incontinence, no bowel incontinence, no chest pain, no dysuria, no fever, no leg pain, no numbness, no paresthesias, no perianal numbness, no tingling and no weakness    Risk factors: obesity        Review of Systems   Constitutional:  Negative for chills and fever.   HENT:  Negative for ear pain and sore throat.    Eyes:  Negative for pain and visual disturbance.   Respiratory:  Negative for cough and shortness of breath.    Cardiovascular:  Negative for chest pain and palpitations.   Gastrointestinal:  Negative for abdominal pain, bowel incontinence and vomiting.   Genitourinary:  Negative for bladder incontinence, dysuria and hematuria.   Musculoskeletal:  Positive for back pain. Negative for arthralgias.   Skin:  Negative for color change and rash.   Neurological:  Negative for tingling, seizures, syncope, weakness,  "numbness and paresthesias.   All other systems reviewed and are negative.          Objective       ED Triage Vitals [01/28/25 2033]   Temperature Pulse Blood Pressure Respirations SpO2 Patient Position - Orthostatic VS   97.8 °F (36.6 °C) 81 158/93 18 99 % Lying      Temp Source Heart Rate Source BP Location FiO2 (%) Pain Score    Oral Monitor Left arm -- 10 - Worst Possible Pain      Vitals      Date and Time Temp Pulse SpO2 Resp BP Pain Score FACES Pain Rating User   01/28/25 2053 -- -- -- -- -- 10 - Worst Possible Pain -- AN   01/28/25 2033 97.8 °F (36.6 °C) 81 99 % 18 158/93 10 - Worst Possible Pain -- AN            Physical Exam  Constitutional:       General: She is in acute distress.   Musculoskeletal:        Arms:       Left Lower Extremity: Left leg is amputated below knee.         Results Reviewed       None            XR thoracic spine 2 views    (Results Pending)       Procedures    ED Medication and Procedure Management   Prior to Admission Medications   Prescriptions Last Dose Informant Patient Reported? Taking?   Advair -21 MCG/ACT inhaler  Self No No   Sig: Inhale 2 puffs 2 (two) times a day Rinse mouth after use   Alcohol Swabs (Pharmacist Choice Alcohol) PADS  Self No No   Sig: USE 3-4 TIMES AS DIRECTED.   B-D INS SYR MICROFINE .5CC/28G 28G X 1/2\" 0.5 ML MISC  Self No No   Sig: USE AS DIRECTED   Blood Glucose Monitoring Suppl (ONE TOUCH ULTRA 2) w/Device KIT  Self No No   Sig: BY DEVICE ROUTE 2 (TWO) TIMES A DAY CHECK BLOOD SUGARS AND RECORD VALUE AND TIME OF DAY TWICE A DAY   Blood Pressure Monitoring (Blood Pressure Monitor Automat) KATLYN  Self No No   Sig: Use Daily as Directed   Patient not taking: Reported on 9/11/2024   Comfort EZ Pen Needles 33G X 4 MM MISC  Self No No   Sig: USE AS DIRECTED   DULoxetine (CYMBALTA) 30 mg delayed release capsule   No No   Sig: TAKE 2 CAPSULES (60 MG TOTAL) BY MOUTH DAILY   Dulaglutide (Trulicity) 1.5 MG/0.5ML SOAJ   No No   Sig: AS DIRECTED WEEKLY " "  Global Inject Ease Lancets 30G MISC  Self No No   Sig: USE 3 (THREE) TIMES A DAY   Insulin Pen Needle (BD Pen Needle Leann 2nd Gen) 32G X 4 MM MISC   No No   Sig: INJECT UNDER THE SKIN 4 (FOUR) TIMES A DAY (BEFORE MEALS AND AT BEDTIME)   Lantus 100 UNIT/ML subcutaneous injection  Self No No   Sig: INJECT 30 UNITS UNDER THE SKIN EVERY 12 (TWELVE) HOURS   OneTouch Ultra test strip  Self No No   Sig: Use 1 each daily as needed (before meals) Use as instructed   Sure Comfort Insulin Syringe 31G X 5/16\" 0.3 ML MISC  Self Yes No   Si (two) times a day Use as directed   acetaminophen (TYLENOL) 500 mg tablet   No No   Sig: Take 2 tablets (1,000 mg total) by mouth every 8 (eight) hours as needed for mild pain   albuterol (PROVENTIL HFA,VENTOLIN HFA) 90 mcg/act inhaler   No No   Sig: INHALE 2 PUFFS EVERY 4 HOURS AS NEEDED FOR WHEEZING OR SHORTNESS OF BREATH   aspirin 81 mg chewable tablet   No No   Sig: CHEW 1 TABLET (81 MG TOTAL) DAILY   atorvastatin (LIPITOR) 40 mg tablet  Self No No   Sig: TAKE 1 TABLET (40 MG TOTAL) BY MOUTH DAILY WITH DINNER   dulaglutide (Trulicity) 0.75 MG/0.5ML injection  Self No No   Sig: Inject 0.5 mL (0.75 mg total) under the skin every 7 days   glycerin-hypromellose- (ARTIFICIAL TEARS) 0.2-0.2-1 % SOLN  Self No No   Sig: Administer 2 drops to both eyes every 6 (six) hours as needed (for eye discomfort)   hydrOXYzine HCL (ATARAX) 50 mg tablet  Self No No   Sig: TAKE 1 TABLET (50 MG TOTAL) BY MOUTH DAILY AT BEDTIME AS NEEDED FOR ITCHING OR ANXIETY   ibuprofen (MOTRIN) 600 mg tablet   No No   Sig: Take 1 tablet (600 mg total) by mouth every 8 (eight) hours as needed for mild pain   insulin lispro (HumALOG/ADMELOG) 100 units/mL injection  Self No No   Sig: INJECT 4-16 UNITS UNDER THE SKIN 3 (THREE) TIMES A DAY BEFORE MEALS   ketotifen (ZADITOR) 0.025 % ophthalmic solution  Self No No   Sig: Administer 1 drop to both eyes 2 (two) times a day as needed (for eye discomfort)   lisinopril " (ZESTRIL) 20 mg tablet  Self No No   Sig: TAKE 1 TABLET (20 MG TOTAL) BY MOUTH DAILY   magnesium chloride-calcium (Slow-Mag) 71.5-119 mg   No No   Sig: Take 2 tablets by mouth daily for 7 days   metFORMIN (GLUCOPHAGE) 1000 MG tablet   No No   Sig: TAKE ONE (1) TABLET BY MOUTH TWICE DAILY WITH FOOD   naproxen (Naprosyn) 500 mg tablet   No No   Sig: Take 1 tablet (500 mg total) by mouth 2 (two) times a day with meals for 7 days   nicotine (NICODERM CQ) 21 mg/24 hr TD 24 hr patch  Self No No   Sig: Place 1 patch on the skin over 24 hours daily   Patient not taking: Reported on 9/11/2024   prazosin (MINIPRESS) 1 mg capsule   No No   Sig: TAKE ONE (1) CAPSULE ONCE A DAY AT BEDTIME FOR HYPERTENSION   rivaroxaban (Xarelto) 20 mg tablet   No No   Sig: TAKE 1 TABLET (20 MG TOTAL) BY MOUTH DAILY WITH BREAKFAST   topiramate (TOPAMAX) 25 mg tablet  Self No No   Sig: TAKE 1 TABLET (25 MG TOTAL) BY MOUTH 2 (TWO) TIMES A DAY      Facility-Administered Medications: None     Current Discharge Medication List        START taking these medications    Details   ketorolac (TORADOL) 10 mg tablet Take 1 tablet (10 mg total) by mouth every 6 (six) hours as needed for moderate pain  Qty: 20 tablet, Refills: 0    Associated Diagnoses: Acute thoracic back pain      lidocaine (Lidoderm) 5 % Apply 1 patch topically over 12 hours daily Remove & Discard patch within 12 hours or as directed by MD  Qty: 5 patch, Refills: 0    Associated Diagnoses: Acute thoracic back pain      methocarbamol (ROBAXIN) 500 mg tablet Take 1 tablet (500 mg total) by mouth 2 (two) times a day  Qty: 20 tablet, Refills: 0    Associated Diagnoses: Acute thoracic back pain           CONTINUE these medications which have NOT CHANGED    Details   acetaminophen (TYLENOL) 500 mg tablet Take 2 tablets (1,000 mg total) by mouth every 8 (eight) hours as needed for mild pain  Qty: 60 tablet, Refills: 0    Associated Diagnoses: Cellulitis of abdominal wall      Advair -21  "MCG/ACT inhaler Inhale 2 puffs 2 (two) times a day Rinse mouth after use  Qty: 12 g, Refills: 1    Comments: Substitution to a formulary equivalent within the same pharmaceutical class is authorized.  Associated Diagnoses: Chronic obstructive pulmonary disease with acute exacerbation ([facility])      albuterol (PROVENTIL HFA,VENTOLIN HFA) 90 mcg/act inhaler INHALE 2 PUFFS EVERY 4 HOURS AS NEEDED FOR WHEEZING OR SHORTNESS OF BREATH  Qty: 8.5 g, Refills: 2    Comments: Substitution to a formulary equivalent within the same pharmaceutical class is authorized.  Associated Diagnoses: Chronic obstructive pulmonary disease, unspecified COPD type ([facility])      Alcohol Swabs (Pharmacist Choice Alcohol) PADS USE 3-4 TIMES AS DIRECTED.  Qty: 100 each, Refills: 3    Associated Diagnoses: Uncontrolled type 2 diabetes mellitus with hyperglycemia ([facility])      aspirin 81 mg chewable tablet CHEW 1 TABLET (81 MG TOTAL) DAILY  Qty: 30 tablet, Refills: 2    Associated Diagnoses: PAD (peripheral artery disease) ([facility])      atorvastatin (LIPITOR) 40 mg tablet TAKE 1 TABLET (40 MG TOTAL) BY MOUTH DAILY WITH DINNER  Qty: 30 tablet, Refills: 5    Associated Diagnoses: PAD (peripheral artery disease) ([facility])      B-D INS SYR MICROFINE .5CC/28G 28G X 1/2\" 0.5 ML MISC USE AS DIRECTED  Qty: 100 each, Refills: 5    Associated Diagnoses: Type 2 diabetes mellitus with diabetic polyneuropathy, with long-term current use of insulin ([facility])      Blood Glucose Monitoring Suppl (ONE TOUCH ULTRA 2) w/Device KIT BY DEVICE ROUTE 2 (TWO) TIMES A DAY CHECK BLOOD SUGARS AND RECORD VALUE AND TIME OF DAY TWICE A DAY  Qty: 1 kit, Refills: 2    Associated Diagnoses: Uncontrolled type 2 diabetes mellitus with hyperglycemia ([facility])      Blood Pressure Monitoring (Blood Pressure Monitor Automat) KATLYN Use Daily as Directed  Qty: 1 each, Refills: 0    Associated Diagnoses: Hypertension, unspecified type      !! Comfort EZ Pen Needles 33G X 4 MM MISC USE AS DIRECTED  Qty: 300 each, " Refills: 0    Associated Diagnoses: Type 2 diabetes mellitus with diabetic polyneuropathy, with long-term current use of insulin ([facility])      dulaglutide (Trulicity) 0.75 MG/0.5ML injection Inject 0.5 mL (0.75 mg total) under the skin every 7 days  Qty: 6 mL, Refills: 1    Associated Diagnoses: Type 2 diabetes mellitus with diabetic polyneuropathy, with long-term current use of insulin ([facility])      Dulaglutide (Trulicity) 1.5 MG/0.5ML SOAJ AS DIRECTED WEEKLY  Qty: 2 mL, Refills: 5    Associated Diagnoses: Type 2 diabetes mellitus with diabetic polyneuropathy, with long-term current use of insulin ([facility])      DULoxetine (CYMBALTA) 30 mg delayed release capsule TAKE 2 CAPSULES (60 MG TOTAL) BY MOUTH DAILY  Qty: 60 capsule, Refills: 5    Associated Diagnoses: Bipolar disorder, in partial remission, most recent episode depressed ([facility])      Global Inject Ease Lancets 30G MISC USE 3 (THREE) TIMES A DAY  Qty: 100 each, Refills: 0    Associated Diagnoses: Type 2 diabetes mellitus with diabetic polyneuropathy, with long-term current use of insulin ([facility])      glycerin-hypromellose- (ARTIFICIAL TEARS) 0.2-0.2-1 % SOLN Administer 2 drops to both eyes every 6 (six) hours as needed (for eye discomfort)  Qty: 30 mL, Refills: 1    Associated Diagnoses: Discomfort of both eyes      hydrOXYzine HCL (ATARAX) 50 mg tablet TAKE 1 TABLET (50 MG TOTAL) BY MOUTH DAILY AT BEDTIME AS NEEDED FOR ITCHING OR ANXIETY  Qty: 30 tablet, Refills: 0    Associated Diagnoses: Bipolar disorder, in partial remission, most recent episode depressed ([facility])      ibuprofen (MOTRIN) 600 mg tablet Take 1 tablet (600 mg total) by mouth every 8 (eight) hours as needed for mild pain  Qty: 30 tablet, Refills: 0    Associated Diagnoses: Cellulitis of abdominal wall      insulin lispro (HumALOG/ADMELOG) 100 units/mL injection INJECT 4-16 UNITS UNDER THE SKIN 3 (THREE) TIMES A DAY BEFORE MEALS  Qty: 10 mL, Refills: 6    Associated Diagnoses: Type 2 diabetes mellitus  with diabetic polyneuropathy, with long-term current use of insulin ([facility])      !! Insulin Pen Needle (BD Pen Needle Leann 2nd Gen) 32G X 4 MM MISC INJECT UNDER THE SKIN 4 (FOUR) TIMES A DAY (BEFORE MEALS AND AT BEDTIME)  Qty: 100 each, Refills: 1    Associated Diagnoses: Type 2 diabetes mellitus with diabetic polyneuropathy, with long-term current use of insulin ([facility])      ketotifen (ZADITOR) 0.025 % ophthalmic solution Administer 1 drop to both eyes 2 (two) times a day as needed (for eye discomfort)  Qty: 5 mL, Refills: 0    Associated Diagnoses: Discomfort of both eyes      Lantus 100 UNIT/ML subcutaneous injection INJECT 30 UNITS UNDER THE SKIN EVERY 12 (TWELVE) HOURS  Qty: 10 mL, Refills: 6    Associated Diagnoses: Type 2 diabetes mellitus with diabetic polyneuropathy, with long-term current use of insulin ([facility])      lisinopril (ZESTRIL) 20 mg tablet TAKE 1 TABLET (20 MG TOTAL) BY MOUTH DAILY  Qty: 100 tablet, Refills: 1    Associated Diagnoses: Hypertension      magnesium chloride-calcium (Slow-Mag) 71.5-119 mg Take 2 tablets by mouth daily for 7 days  Qty: 14 tablet, Refills: 0    Associated Diagnoses: Hypomagnesemia      metFORMIN (GLUCOPHAGE) 1000 MG tablet TAKE ONE (1) TABLET BY MOUTH TWICE DAILY WITH FOOD  Qty: 60 tablet, Refills: 2    Associated Diagnoses: Diabetes (HCC)      naproxen (Naprosyn) 500 mg tablet Take 1 tablet (500 mg total) by mouth 2 (two) times a day with meals for 7 days  Qty: 14 tablet, Refills: 0    Associated Diagnoses: Neuropathy      nicotine (NICODERM CQ) 21 mg/24 hr TD 24 hr patch Place 1 patch on the skin over 24 hours daily  Qty: 28 patch, Refills: 3    Associated Diagnoses: Tobacco consumption; Nicotine dependence, uncomplicated, unspecified nicotine product type      OneTouch Ultra test strip Use 1 each daily as needed (before meals) Use as instructed  Qty: 200 each, Refills: 2    Associated Diagnoses: Type 2 diabetes mellitus with diabetic polyneuropathy, with long-term current  "use of insulin ([facility])      prazosin (MINIPRESS) 1 mg capsule TAKE ONE (1) CAPSULE ONCE A DAY AT BEDTIME FOR HYPERTENSION  Qty: 90 capsule, Refills: 1    Associated Diagnoses: Hypertension      rivaroxaban (Xarelto) 20 mg tablet TAKE 1 TABLET (20 MG TOTAL) BY MOUTH DAILY WITH BREAKFAST  Qty: 30 tablet, Refills: 5    Associated Diagnoses: PAD (peripheral artery disease) ([facility])      Sure Comfort Insulin Syringe 31G X 5/16\" 0.3 ML MISC 2 (two) times a day Use as directed      topiramate (TOPAMAX) 25 mg tablet TAKE 1 TABLET (25 MG TOTAL) BY MOUTH 2 (TWO) TIMES A DAY  Qty: 60 tablet, Refills: 1    Associated Diagnoses: Bipolar disorder, in partial remission, most recent episode depressed ([facility])       !! - Potential duplicate medications found. Please discuss with provider.        No discharge procedures on file.  ED SEPSIS DOCUMENTATION   Time reflects when diagnosis was documented in both MDM as applicable and the Disposition within this note       Time User Action Codes Description Comment    1/28/2025  9:22 PM Manuela Bradford Add [M54.6] Acute thoracic back pain                  Manuela Bradford,   01/28/25 2131    "

## 2025-01-30 ENCOUNTER — TELEPHONE (OUTPATIENT)
Dept: FAMILY MEDICINE CLINIC | Facility: CLINIC | Age: 40
End: 2025-01-30

## 2025-01-31 RX ORDER — RIVAROXABAN 20 MG/1
TABLET, FILM COATED ORAL
Qty: 30 TABLET | Refills: 5 | Status: SHIPPED | OUTPATIENT
Start: 2025-01-31

## 2025-02-11 DIAGNOSIS — J44.9 CHRONIC OBSTRUCTIVE PULMONARY DISEASE, UNSPECIFIED COPD TYPE (HCC): ICD-10-CM

## 2025-02-11 DIAGNOSIS — E11.9 DIABETES (HCC): ICD-10-CM

## 2025-02-11 DIAGNOSIS — I10 HYPERTENSION: ICD-10-CM

## 2025-02-11 DIAGNOSIS — I73.9 PAD (PERIPHERAL ARTERY DISEASE) (HCC): ICD-10-CM

## 2025-02-11 NOTE — TELEPHONE ENCOUNTER
Called pt, left a VM to have them call us back to schedule an appt at their earliest convenience for an annual physical exam. I will also send  a  [employer] message.thank you.

## 2025-02-11 NOTE — TELEPHONE ENCOUNTER
Called pt, left a VM to have them call us back to schedule an appt at their earliest convenience for an annual physical exam. I will also send a [employer] message.thank you.

## 2025-02-14 RX ORDER — LISINOPRIL 20 MG/1
20 TABLET ORAL DAILY
Qty: 90 TABLET | Refills: 1 | Status: SHIPPED | OUTPATIENT
Start: 2025-02-14

## 2025-02-14 RX ORDER — ALBUTEROL SULFATE 90 UG/1
INHALANT RESPIRATORY (INHALATION)
Qty: 8.5 G | Refills: 2 | Status: SHIPPED | OUTPATIENT
Start: 2025-02-14

## 2025-02-14 RX ORDER — ATORVASTATIN CALCIUM 40 MG/1
40 TABLET, FILM COATED ORAL
Qty: 90 TABLET | Refills: 1 | Status: SHIPPED | OUTPATIENT
Start: 2025-02-14

## 2025-02-18 DIAGNOSIS — F31.75 BIPOLAR DISORDER, IN PARTIAL REMISSION, MOST RECENT EPISODE DEPRESSED (HCC): ICD-10-CM

## 2025-02-21 RX ORDER — DULOXETIN HYDROCHLORIDE 30 MG/1
60 CAPSULE, DELAYED RELEASE ORAL DAILY
Qty: 60 CAPSULE | Refills: 5 | Status: SHIPPED | OUTPATIENT
Start: 2025-02-21

## 2025-02-23 ENCOUNTER — APPOINTMENT (EMERGENCY)
Dept: CT IMAGING | Facility: HOSPITAL | Age: 40
End: 2025-02-23
Payer: COMMERCIAL

## 2025-02-23 ENCOUNTER — HOSPITAL ENCOUNTER (EMERGENCY)
Facility: HOSPITAL | Age: 40
Discharge: HOME | End: 2025-02-23
Attending: EMERGENCY MEDICINE | Admitting: EMERGENCY MEDICINE
Payer: COMMERCIAL

## 2025-02-23 VITALS
HEART RATE: 76 BPM | BODY MASS INDEX: 44.99 KG/M2 | OXYGEN SATURATION: 100 % | RESPIRATION RATE: 14 BRPM | TEMPERATURE: 98 F | DIASTOLIC BLOOD PRESSURE: 80 MMHG | SYSTOLIC BLOOD PRESSURE: 150 MMHG | WEIGHT: 238.1 LBS

## 2025-02-23 DIAGNOSIS — K76.9 LIVER LESION: ICD-10-CM

## 2025-02-23 DIAGNOSIS — E11.65 HYPERGLYCEMIA DUE TO DIABETES MELLITUS (HCC): Primary | ICD-10-CM

## 2025-02-23 DIAGNOSIS — R10.11 RIGHT UPPER QUADRANT ABDOMINAL PAIN: ICD-10-CM

## 2025-02-23 DIAGNOSIS — E87.1 HYPONATREMIA: ICD-10-CM

## 2025-02-23 LAB
ALBUMIN SERPL BCG-MCNC: 3.8 G/DL (ref 3.5–5)
ALP SERPL-CCNC: 113 U/L (ref 34–104)
ALT SERPL W P-5'-P-CCNC: 21 U/L (ref 7–52)
ANION GAP SERPL CALCULATED.3IONS-SCNC: 8 MMOL/L (ref 4–13)
AST SERPL W P-5'-P-CCNC: 13 U/L (ref 13–39)
BASOPHILS # BLD AUTO: 0.03 THOUSANDS/ÂΜL (ref 0–0.1)
BASOPHILS NFR BLD AUTO: 0 % (ref 0–1)
BILIRUB SERPL-MCNC: 0.23 MG/DL (ref 0.2–1)
BUN SERPL-MCNC: 19 MG/DL (ref 5–25)
CALCIUM SERPL-MCNC: 8.8 MG/DL (ref 8.4–10.2)
CARDIAC TROPONIN I PNL SERPL HS: 4 NG/L (ref ?–50)
CHLORIDE SERPL-SCNC: 100 MMOL/L (ref 96–108)
CO2 SERPL-SCNC: 24 MMOL/L (ref 21–32)
CREAT SERPL-MCNC: 0.53 MG/DL (ref 0.6–1.3)
EOSINOPHIL # BLD AUTO: 0.23 THOUSAND/ÂΜL (ref 0–0.61)
EOSINOPHIL NFR BLD AUTO: 3 % (ref 0–6)
ERYTHROCYTE [DISTWIDTH] IN BLOOD BY AUTOMATED COUNT: 14.9 % (ref 11.6–15.1)
GFR SERPL CREATININE-BSD FRML MDRD: 120 ML/MIN/1.73SQ M
GLUCOSE SERPL-MCNC: 365 MG/DL (ref 65–140)
HCG SERPL QL: NEGATIVE
HCT VFR BLD AUTO: 38 % (ref 34.8–46.1)
HGB BLD-MCNC: 13.4 G/DL (ref 11.5–15.4)
IMM GRANULOCYTES # BLD AUTO: 0.03 THOUSAND/UL (ref 0–0.2)
IMM GRANULOCYTES NFR BLD AUTO: 0 % (ref 0–2)
LIPASE SERPL-CCNC: 25 U/L (ref 11–82)
LYMPHOCYTES # BLD AUTO: 3.54 THOUSANDS/ÂΜL (ref 0.6–4.47)
LYMPHOCYTES NFR BLD AUTO: 38 % (ref 14–44)
MCH RBC QN AUTO: 25.7 PG (ref 26.8–34.3)
MCHC RBC AUTO-ENTMCNC: 35.3 G/DL (ref 31.4–37.4)
MCV RBC AUTO: 73 FL (ref 82–98)
MONOCYTES # BLD AUTO: 0.45 THOUSAND/ÂΜL (ref 0.17–1.22)
MONOCYTES NFR BLD AUTO: 5 % (ref 4–12)
NEUTROPHILS # BLD AUTO: 4.97 THOUSANDS/ÂΜL (ref 1.85–7.62)
NEUTS SEG NFR BLD AUTO: 54 % (ref 43–75)
NRBC BLD AUTO-RTO: 0 /100 WBCS
PLATELET # BLD AUTO: 377 THOUSANDS/UL (ref 149–390)
PMV BLD AUTO: 10.8 FL (ref 8.9–12.7)
POTASSIUM SERPL-SCNC: 3.9 MMOL/L (ref 3.5–5.3)
PROT SERPL-MCNC: 6.9 G/DL (ref 6.4–8.4)
RBC # BLD AUTO: 5.21 MILLION/UL (ref 3.81–5.12)
SODIUM SERPL-SCNC: 132 MMOL/L (ref 135–147)
WBC # BLD AUTO: 9.25 THOUSAND/UL (ref 4.31–10.16)

## 2025-02-23 PROCEDURE — 96374 THER/PROPH/DIAG INJ IV PUSH: CPT

## 2025-02-23 PROCEDURE — 74177 CT ABD & PELVIS W/CONTRAST: CPT

## 2025-02-23 PROCEDURE — 96361 HYDRATE IV INFUSION ADD-ON: CPT

## 2025-02-23 PROCEDURE — 99285 EMERGENCY DEPT VISIT HI MDM: CPT | Performed by: EMERGENCY MEDICINE

## 2025-02-23 PROCEDURE — 80053 COMPREHEN METABOLIC PANEL: CPT | Performed by: EMERGENCY MEDICINE

## 2025-02-23 PROCEDURE — 83690 ASSAY OF LIPASE: CPT | Performed by: EMERGENCY MEDICINE

## 2025-02-23 PROCEDURE — 84703 CHORIONIC GONADOTROPIN ASSAY: CPT | Performed by: EMERGENCY MEDICINE

## 2025-02-23 PROCEDURE — 93005 ELECTROCARDIOGRAM TRACING: CPT

## 2025-02-23 PROCEDURE — 84484 ASSAY OF TROPONIN QUANT: CPT | Performed by: EMERGENCY MEDICINE

## 2025-02-23 PROCEDURE — 96376 TX/PRO/DX INJ SAME DRUG ADON: CPT

## 2025-02-23 PROCEDURE — 85025 COMPLETE CBC W/AUTO DIFF WBC: CPT | Performed by: EMERGENCY MEDICINE

## 2025-02-23 PROCEDURE — 99285 EMERGENCY DEPT VISIT HI MDM: CPT

## 2025-02-23 PROCEDURE — 36415 COLL VENOUS BLD VENIPUNCTURE: CPT | Performed by: EMERGENCY MEDICINE

## 2025-02-23 RX ORDER — MAGNESIUM HYDROXIDE/ALUMINUM HYDROXICE/SIMETHICONE 120; 1200; 1200 MG/30ML; MG/30ML; MG/30ML
30 SUSPENSION ORAL ONCE
Status: COMPLETED | OUTPATIENT
Start: 2025-02-23 | End: 2025-02-23

## 2025-02-23 RX ORDER — KETOROLAC TROMETHAMINE 30 MG/ML
15 INJECTION, SOLUTION INTRAMUSCULAR; INTRAVENOUS ONCE
Status: COMPLETED | OUTPATIENT
Start: 2025-02-23 | End: 2025-02-23

## 2025-02-23 RX ORDER — LIDOCAINE HYDROCHLORIDE 20 MG/ML
15 SOLUTION OROPHARYNGEAL ONCE
Status: COMPLETED | OUTPATIENT
Start: 2025-02-23 | End: 2025-02-23

## 2025-02-23 RX ADMIN — ALUMINUM HYDROXIDE, MAGNESIUM HYDROXIDE, AND DIMETHICONE 30 ML: 200; 20; 200 SUSPENSION ORAL at 09:13

## 2025-02-23 RX ADMIN — KETOROLAC TROMETHAMINE 15 MG: 30 INJECTION, SOLUTION INTRAMUSCULAR at 09:13

## 2025-02-23 RX ADMIN — LIDOCAINE HYDROCHLORIDE 15 ML: 20 SOLUTION ORAL at 09:13

## 2025-02-23 RX ADMIN — IOHEXOL 100 ML: 350 INJECTION, SOLUTION INTRAVENOUS at 08:44

## 2025-02-23 RX ADMIN — SODIUM CHLORIDE 1000 ML: 0.9 INJECTION, SOLUTION INTRAVENOUS at 07:57

## 2025-02-23 RX ADMIN — KETOROLAC TROMETHAMINE 15 MG: 30 INJECTION, SOLUTION INTRAMUSCULAR at 08:02

## 2025-02-23 NOTE — ED NOTES
Pt states she has no family or friends that can drive her home, pt requesting a LYFT.  Pt's wife is present, she states she can help pt from the LYFT into the home. Pt transitioned by self to wheelchair, LYFT called and pt awaits LYFT for ride home. Pt in no acute distress, she denies questions at this time about her DC paperwork.     Kourtney Bell RN  02/23/25 [id number]

## 2025-02-23 NOTE — ED PROVIDER NOTES
Time reflects when diagnosis was documented in both MDM as applicable and the Disposition within this note       Time User Action Codes Description Comment    2/23/2025  9:02 AM Kevin Gusman Add [R10.11] Right upper quadrant abdominal pain     2/23/2025  9:02 AM Kevin Gusman Add [K76.9] Liver lesion     2/23/2025  9:13 AM Kevin Gusman Add [E11.65] Hyperglycemia due to diabetes mellitus (HCC)     2/23/2025  9:14 AM Kevin Gusman Modify [R10.11] Right upper quadrant abdominal pain     2/23/2025  9:14 AM Kevin Gusman Modify [E11.65] Hyperglycemia due to diabetes mellitus (HCC)     2/23/2025  9:14 AM Kevin Gusman Add [E87.1] Hyponatremia           ED Disposition       ED Disposition   Discharge    Condition   Stable    Date/Time   Sun Feb 23, 2025  9:02 AM    Comment   Tony Roberto discharge to home/self care.                   Assessment & Plan       Medical Decision Making  39-year-old female presented to the emergency department for evaluation of abdominal pain.  On arrival patient awake, alert and in no acute distress.  EKG was ordered to evaluate for acute ischemia/arrhythmia.  EKG on my independent interpretation with a sinus rhythm with no acute ischemic changes.  CT scan of the patient's abdomen and pelvis ordered to evaluate for acute pathology including but not limited to infection, abscess, obstruction, perforation.  CT scan with no acute findings.  Blood work showed the patient was hyperglycemic and otherwise consistent with her baseline.  Blood work not consistent with DKA.  Patient treated symptomatically with a fluid bolus, Toradol x 2 and a GI cocktail.  On reevaluation the patient reported some improvement of symptoms.  All diagnostic studies were discussed with the patient in detail.  Recommendation was made for the patient to follow-up with her PCP and with gastroenterology for continued symptoms.  Follow-up information was provided.  Return precautions were  discussed.    The patient (and/or family present) agrees with the plan for discharge and feels comfortable to go home with proper follow-up. Diagnostic tests were reviewed. Diagnosis, care plan and treatment options were discussed. All questions were answered prior to discharge. I considered the patient's other medical conditions as applicable/noted above in my medical decision making. Advised the patient to return for worsening or additional problems. The patient (and/or family present) verbalized understanding of the discharge instructions and warnings that would necessitate return to the Emergency Department.          Amount and/or Complexity of Data Reviewed  Labs: ordered. Decision-making details documented in ED Course.  Radiology: ordered. Decision-making details documented in ED Course.  ECG/medicine tests: ordered and independent interpretation performed.    Risk  OTC drugs.  Prescription drug management.        ED Course as of 02/23/25 1143   Sun Feb 23, 2025   0859 CT abdomen pelvis with contrast  IMPRESSION:     No acute CT findings in the abdomen or pelvis. Additional findings as above.    LIVER/BILIARY TREE: There is a tiny 4 mm hypodense lesion in the left hepatic lobe on series 201, image 34, too small to accurately characterize. No biliary ductal dilatation.   0859 ALK PHOS(!): 113  baseline   0914 GLUCOSE(!): 365   0914 Carbon Dioxide: 24   0914 ANION GAP: 8   0935 Reevaluated.  Reports improvement of symptoms.  Patient is requesting discharge at this time.  All diagnostic studies discussed with the patient in detail.  Patient counseled on her elevated glucose level.  Recommendation was made for the patient to follow-up with her PCP and with gastroenterology for continued symptoms.  Return precautions were discussed.       Medications   sodium chloride 0.9 % bolus 1,000 mL (0 mL Intravenous Stopped 2/23/25 0949)   ketorolac (TORADOL) injection 15 mg (15 mg Intravenous Given 2/23/25 0802)   iohexol  "(OMNIPAQUE) 350 MG/ML injection (SINGLE-DOSE) 100 mL (100 mL Intravenous Given 25 0844)   aluminum-magnesium hydroxide-simethicone (MAALOX) oral suspension 30 mL (30 mL Oral Given 25)   Lidocaine Viscous HCl (XYLOCAINE) 2 % mucosal solution 15 mL (15 mL Swish & Spit Given 25)   ketorolac (TORADOL) injection 15 mg (15 mg Intravenous Given 25)       ED Risk Strat Scores                                                History of Present Illness       Chief Complaint   Patient presents with    Abdominal Pain     Pt c/o RUQ abdominal pain, started at 0500 today. Denies N/V/D/fevers. Pt reports she started her menses last evening, has \"lower stomach cramps but this is different\".        Past Medical History:   Diagnosis Date    Asthma     Atherosclerosis     Bipolar 1 disorder (HCC)     COPD (chronic obstructive pulmonary disease) (HCC)     Depression     Diabetes mellitus (HCC)     Hypertension     Psychiatric disorder     cutting history    PTSD (post-traumatic stress disorder)     Schizoaffective disorder (HCC)     Tendonitis       Past Surgical History:   Procedure Laterality Date    AMPUTATION ABOVE KNEE (AKA) Left 3/29/2023    Procedure: AMPUTATION ABOVE KNEE (AKA);  Surgeon: Savi Langford MD;  Location: [facility];  Service: Vascular    BYPASS FEMORAL-POPLITEAL Left 2021    Procedure: Left Common Femoral Below Knee to Popliteal Bypass with Insitu GSV graft.  Left Lower Extremity Angiogram;  Surgeon: Savi Langford MD;  Location: [facility];  Service: Vascular     SECTION      EAR SURGERY      FL LUMBAR PUNCTURE DIAGNOSTIC  10/25/2018    IR LOWER EXTREMITY ANGIOGRAM  2021    IR LOWER EXTREMITY ANGIOGRAM  2021    OR SLCTV CATHJ 3RD+ ORD SLCTV ABDL PEL/LXTR BRNCH Left 3/24/2023    Procedure: Left leg angiogram;  Surgeon: Byron Wahl DO;  Location: [facility];  Service: Vascular    TUBAL LIGATION        Family History   Problem Relation Age of Onset    " "Hypertension Mother     Diabetes Mother     HIV Mother     Heart disease Mother     No Known Problems Father       Social History     Tobacco Use    Smoking status: Every Day     Current packs/day: 0.50     Average packs/day: 1 pack/day for 21.7 years (20.9 ttl pk-yrs)     Types: Cigarettes     Start date: 3/24/2003     Last attempt to quit: 3/24/2023     Passive exposure: Current    Smokeless tobacco: Former     Quit date: 4/29/2022   Vaping Use    Vaping status: Never Used   Substance Use Topics    Alcohol use: Not Currently     Comment: not currently    Drug use: Not Currently     Types: Cocaine, Marijuana, \"Crack\" cocaine     Comment: 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     39-year-old female presents to the emergency department for evaluation of abdominal pain.  Patient reports that approximately 2 hours prior to arrival she developed right upper quadrant abdominal pain that she describes as sharp.  She reports that she has had similar episodes in the past.  No fevers, chills, nausea, vomiting or diarrhea.  She did not take any medications to treat her symptoms prior to arrival.  No urinary symptoms.        Review of Systems   Constitutional:  Negative for chills and fever.   HENT:  Negative for ear pain and sore throat.    Eyes:  Negative for pain and visual disturbance.   Respiratory:  Negative for cough and shortness of breath.    Cardiovascular:  Negative for chest pain and palpitations.   Gastrointestinal:  Positive for abdominal pain. Negative for vomiting.   Genitourinary:  Negative for dysuria and hematuria.   Musculoskeletal:  Negative for arthralgias and back pain.   Skin:  Negative for color change and rash.   Neurological:  Negative for seizures and syncope. "   All other systems reviewed and are negative.          Objective       ED Triage Vitals [02/23/25 0719]   Temperature Pulse Blood Pressure Respirations SpO2 Patient Position - Orthostatic VS   98 °F (36.7 °C) 78 (!) 171/84 16 100 % Lying      Temp Source Heart Rate Source BP Location FiO2 (%) Pain Score    Tympanic Monitor Left arm -- 9      Vitals      Date and Time Temp Pulse SpO2 Resp BP Pain Score FACES Pain Rating User   02/23/25 0913 -- -- -- -- -- 9 -- JLT   02/23/25 0901 -- 76 100 % 14 150/80 9 -- JLT   02/23/25 0802 -- -- -- -- -- 9 -- JLT   02/23/25 0719 98 °F (36.7 °C) 78 100 % 16 171/84 9 -- JLT            Physical Exam  Vitals and nursing note reviewed.   Constitutional:       General: She is not in acute distress.     Appearance: She is well-developed. She is obese.   HENT:      Head: Normocephalic and atraumatic.   Eyes:      Conjunctiva/sclera: Conjunctivae normal.   Cardiovascular:      Rate and Rhythm: Normal rate and regular rhythm.      Heart sounds: No murmur heard.  Pulmonary:      Effort: Pulmonary effort is normal. No respiratory distress.      Breath sounds: Normal breath sounds.   Abdominal:      General: Abdomen is protuberant.      Palpations: Abdomen is soft.      Tenderness: There is abdominal tenderness in the right upper quadrant and epigastric area. There is no guarding or rebound.   Musculoskeletal:         General: No swelling.      Cervical back: Neck supple.      Left Lower Extremity: Left leg is amputated above knee.   Skin:     General: Skin is warm and dry.      Capillary Refill: Capillary refill takes less than 2 seconds.   Neurological:      Mental Status: She is alert.   Psychiatric:         Mood and Affect: Mood normal.         Results Reviewed       Procedure Component Value Units Date/Time    hCG, qualitative pregnancy [[id number]]  (Normal) Collected: 02/23/25 0755    Lab Status: Final result Specimen: Blood from Arm, Left Updated: 02/23/25 0829     Preg, Serum Negative     HS Troponin 0hr (reflex protocol) [[id number]]  (Normal) Collected: 02/23/25 0755    Lab Status: Final result Specimen: Blood from Arm, Left Updated: 02/23/25 0828     hs TnI 0hr 4 ng/L     Comprehensive metabolic panel [[id number]]  (Abnormal) Collected: 02/23/25 0755    Lab Status: Final result Specimen: Blood from Arm, Left Updated: 02/23/25 0823     Sodium 132 mmol/L      Potassium 3.9 mmol/L      Chloride 100 mmol/L      CO2 24 mmol/L      ANION GAP 8 mmol/L      BUN 19 mg/dL      Creatinine 0.53 mg/dL      Glucose 365 mg/dL      Calcium 8.8 mg/dL      AST 13 U/L      ALT 21 U/L      Alkaline Phosphatase 113 U/L      Total Protein 6.9 g/dL      Albumin 3.8 g/dL      Total Bilirubin 0.23 mg/dL      eGFR 120 ml/min/1.73sq m     Narrative:      [employer] guidelines for Chronic Kidney Disease (CKD):     Stage 1 with normal or high GFR (GFR > 90 mL/min/1.73 square meters)    Stage 2 Mild CKD (GFR = 60-89 mL/min/1.73 square meters)    Stage 3A Moderate CKD (GFR = 45-59 mL/min/1.73 square meters)    Stage 3B Moderate CKD (GFR = 30-44 mL/min/1.73 square meters)    Stage 4 Severe CKD (GFR = 15-29 mL/min/1.73 square meters)    Stage 5 End Stage CKD (GFR <15 mL/min/1.73 square meters)  Note: GFR calculation is accurate only with a steady state creatinine    Lipase [[id number]]  (Normal) Collected: 02/23/25 0755    Lab Status: Final result Specimen: Blood from Arm, Left Updated: 02/23/25 0823     Lipase 25 u/L     CBC and differential [[id number]]  (Abnormal) Collected: 02/23/25 0755    Lab Status: Final result Specimen: Blood from Arm, Left Updated: 02/23/25 0802     WBC 9.25 Thousand/uL      RBC 5.21 Million/uL      Hemoglobin 13.4 g/dL      Hematocrit 38.0 %      MCV 73 fL      MCH 25.7 pg      MCHC 35.3 g/dL      RDW 14.9 %      MPV 10.8 fL      Platelets 377 Thousands/uL      nRBC 0 /100 WBCs      Segmented % 54 %      Immature Grans % 0 %      Lymphocytes % 38 %      Monocytes % 5 %       "Eosinophils Relative 3 %      Basophils Relative 0 %      Absolute Neutrophils 4.97 Thousands/µL      Absolute Immature Grans 0.03 Thousand/uL      Absolute Lymphocytes 3.54 Thousands/µL      Absolute Monocytes 0.45 Thousand/µL      Eosinophils Absolute 0.23 Thousand/µL      Basophils Absolute 0.03 Thousands/µL             CT abdomen pelvis with contrast   Final Interpretation by Thad Perkins MD (02/23 0856)      No acute CT findings in the abdomen or pelvis. Additional findings as above.         Workstation performed: [id number]             ECG 12 Lead Documentation Only    Date/Time: 2/23/2025 8:13 AM    Performed by: Kevin Gusman MD  Authorized by: Kevin Gusman MD    ECG reviewed by me, the ED Provider: yes    Patient location:  ED  Previous ECG:     Previous ECG:  Compared to current    Similarity:  No change    Comparison to cardiac monitor: Yes    Interpretation:     Interpretation: normal    Rate:     ECG rate assessment: normal    Rhythm:     Rhythm: sinus rhythm    Ectopy:     Ectopy: none    QRS:     QRS axis:  Normal  Conduction:     Conduction: normal    ST segments:     ST segments:  Normal  T waves:     T waves: normal        ED Medication and Procedure Management   Prior to Admission Medications   Prescriptions Last Dose Informant Patient Reported? Taking?   Advair -21 MCG/ACT inhaler  Self No No   Sig: Inhale 2 puffs 2 (two) times a day Rinse mouth after use   Alcohol Swabs (Pharmacist Choice Alcohol) PADS  Self No No   Sig: USE 3-4 TIMES AS DIRECTED.   B-D INS SYR MICROFINE .5CC/28G 28G X 1/2\" 0.5 ML MISC  Self No No   Sig: USE AS DIRECTED   Blood Glucose Monitoring Suppl (ONE TOUCH ULTRA 2) w/Device KIT  Self No No   Sig: BY DEVICE ROUTE 2 (TWO) TIMES A DAY CHECK BLOOD SUGARS AND RECORD VALUE AND TIME OF DAY TWICE A DAY   Blood Pressure Monitoring (Blood Pressure Monitor Automat) KATLYN  Self No No   Sig: Use Daily as Directed   Patient not taking: Reported on 9/11/2024 " "  Comfort EZ Pen Needles 33G X 4 MM MISC  Self No No   Sig: USE AS DIRECTED   DULoxetine (CYMBALTA) 30 mg delayed release capsule   No No   Sig: TAKE 2 CAPSULES (60 MG TOTAL) BY MOUTH DAILY   Dulaglutide (Trulicity) 1.5 MG/0.5ML SOAJ   No No   Sig: AS DIRECTED WEEKLY   Global Inject Ease Lancets 30G MISC  Self No No   Sig: USE 3 (THREE) TIMES A DAY   Insulin Pen Needle (BD Pen Needle Leann 2nd Gen) 32G X 4 MM MISC   No No   Sig: INJECT UNDER THE SKIN 4 (FOUR) TIMES A DAY (BEFORE MEALS AND AT BEDTIME)   Lantus 100 UNIT/ML subcutaneous injection  Self No No   Sig: INJECT 30 UNITS UNDER THE SKIN EVERY 12 (TWELVE) HOURS   OneTouch Ultra test strip  Self No No   Sig: Use 1 each daily as needed (before meals) Use as instructed   Sure Comfort Insulin Syringe 31G X 5/16\" 0.3 ML MISC  Self Yes No   Si (two) times a day Use as directed   Xarelto 20 MG tablet   No No   Sig: TAKE 1 TABLET (20 MG TOTAL) BY MOUTH DAILY WITH BREAKFAST   acetaminophen (TYLENOL) 500 mg tablet   No No   Sig: Take 2 tablets (1,000 mg total) by mouth every 8 (eight) hours as needed for mild pain   albuterol (PROVENTIL HFA,VENTOLIN HFA) 90 mcg/act inhaler   No No   Sig: INHALE 2 PUFFS EVERY 4 HOURS AS NEEDED FOR WHEEZING OR SHORTNESS OF BREATH   aspirin 81 mg chewable tablet   No No   Sig: CHEW 1 TABLET (81 MG TOTAL) DAILY   atorvastatin (LIPITOR) 40 mg tablet   No No   Sig: TAKE 1 TABLET (40 MG TOTAL) BY MOUTH DAILY WITH DINNER   dulaglutide (Trulicity) 0.75 MG/0.5ML injection  Self No No   Sig: Inject 0.5 mL (0.75 mg total) under the skin every 7 days   glycerin-hypromellose- (ARTIFICIAL TEARS) 0.2-0.2-1 % SOLN  Self No No   Sig: Administer 2 drops to both eyes every 6 (six) hours as needed (for eye discomfort)   hydrOXYzine HCL (ATARAX) 50 mg tablet  Self No No   Sig: TAKE 1 TABLET (50 MG TOTAL) BY MOUTH DAILY AT BEDTIME AS NEEDED FOR ITCHING OR ANXIETY   ibuprofen (MOTRIN) 600 mg tablet   No No   Sig: Take 1 tablet (600 mg total) by mouth " every 8 (eight) hours as needed for mild pain   insulin lispro (HumALOG/ADMELOG) 100 units/mL injection  Self No No   Sig: INJECT 4-16 UNITS UNDER THE SKIN 3 (THREE) TIMES A DAY BEFORE MEALS   ketorolac (TORADOL) 10 mg tablet   No No   Sig: Take 1 tablet (10 mg total) by mouth every 6 (six) hours as needed for moderate pain   ketotifen (ZADITOR) 0.025 % ophthalmic solution  Self No No   Sig: Administer 1 drop to both eyes 2 (two) times a day as needed (for eye discomfort)   lidocaine (Lidoderm) 5 %   No No   Sig: Apply 1 patch topically over 12 hours daily Remove & Discard patch within 12 hours or as directed by MD   lisinopril (ZESTRIL) 20 mg tablet   No No   Sig: TAKE 1 TABLET (20 MG TOTAL) BY MOUTH DAILY   magnesium chloride-calcium (Slow-Mag) 71.5-119 mg   No No   Sig: Take 2 tablets by mouth daily for 7 days   metFORMIN (GLUCOPHAGE) 1000 MG tablet   No No   Sig: TAKE ONE (1) TABLET BY MOUTH TWICE DAILY WITH FOOD   methocarbamol (ROBAXIN) 500 mg tablet   No No   Sig: Take 1 tablet (500 mg total) by mouth 2 (two) times a day   naproxen (Naprosyn) 500 mg tablet   No No   Sig: Take 1 tablet (500 mg total) by mouth 2 (two) times a day with meals for 7 days   nicotine (NICODERM CQ) 21 mg/24 hr TD 24 hr patch  Self No No   Sig: Place 1 patch on the skin over 24 hours daily   Patient not taking: Reported on 9/11/2024   prazosin (MINIPRESS) 1 mg capsule   No No   Sig: TAKE ONE (1) CAPSULE ONCE A DAY AT BEDTIME FOR HYPERTENSION   topiramate (TOPAMAX) 25 mg tablet  Self No No   Sig: TAKE 1 TABLET (25 MG TOTAL) BY MOUTH 2 (TWO) TIMES A DAY      Facility-Administered Medications: None     Discharge Medication List as of 2/23/2025  9:37 AM        CONTINUE these medications which have NOT CHANGED    Details   acetaminophen (TYLENOL) 500 mg tablet Take 2 tablets (1,000 mg total) by mouth every 8 (eight) hours as needed for mild pain, Starting Fri 12/20/2024, Normal      Advair -21 MCG/ACT inhaler Inhale 2 puffs 2 (two)  "times a day Rinse mouth after use, Starting Wed 8/9/2023, Normal      albuterol (PROVENTIL HFA,VENTOLIN HFA) 90 mcg/act inhaler INHALE 2 PUFFS EVERY 4 HOURS AS NEEDED FOR WHEEZING OR SHORTNESS OF BREATH, Normal      Alcohol Swabs (Pharmacist Choice Alcohol) PADS USE 3-4 TIMES AS DIRECTED., Normal      aspirin 81 mg chewable tablet CHEW 1 TABLET (81 MG TOTAL) DAILY, Starting Thu 9/12/2024, Normal      atorvastatin (LIPITOR) 40 mg tablet TAKE 1 TABLET (40 MG TOTAL) BY MOUTH DAILY WITH DINNER, Starting Fri 2/14/2025, Normal      B-D INS SYR MICROFINE .5CC/28G 28G X 1/2\" 0.5 ML MISC USE AS DIRECTED, Normal      Blood Glucose Monitoring Suppl (ONE TOUCH ULTRA 2) w/Device KIT BY DEVICE ROUTE 2 (TWO) TIMES A DAY CHECK BLOOD SUGARS AND RECORD VALUE AND TIME OF DAY TWICE A DAY, Normal      Blood Pressure Monitoring (Blood Pressure Monitor Automat) KATLYN Use Daily as Directed, Normal      !! Comfort EZ Pen Needles 33G X 4 MM MISC USE AS DIRECTED, Normal      dulaglutide (Trulicity) 0.75 MG/0.5ML injection Inject 0.5 mL (0.75 mg total) under the skin every 7 days, Starting Tue 4/2/2024, Normal      Dulaglutide (Trulicity) 1.5 MG/0.5ML SOAJ AS DIRECTED WEEKLY, Normal      DULoxetine (CYMBALTA) 30 mg delayed release capsule TAKE 2 CAPSULES (60 MG TOTAL) BY MOUTH DAILY, Starting Fri 2/21/2025, Normal      Global Inject Ease Lancets 30G MISC USE 3 (THREE) TIMES A DAY, Starting Wed 11/1/2023, Normal      glycerin-hypromellose- (ARTIFICIAL TEARS) 0.2-0.2-1 % SOLN Administer 2 drops to both eyes every 6 (six) hours as needed (for eye discomfort), Starting Mon 8/28/2023, Normal      hydrOXYzine HCL (ATARAX) 50 mg tablet TAKE 1 TABLET (50 MG TOTAL) BY MOUTH DAILY AT BEDTIME AS NEEDED FOR ITCHING OR ANXIETY, Starting Wed 10/18/2023, Normal      ibuprofen (MOTRIN) 600 mg tablet Take 1 tablet (600 mg total) by mouth every 8 (eight) hours as needed for mild pain, Starting Fri 12/20/2024, Normal      insulin lispro (HumALOG/ADMELOG) " 100 units/mL injection INJECT 4-16 UNITS UNDER THE SKIN 3 (THREE) TIMES A DAY BEFORE MEALS, Starting Thu 5/30/2024, Normal      !! Insulin Pen Needle (BD Pen Needle Leann 2nd Gen) 32G X 4 MM MISC INJECT UNDER THE SKIN 4 (FOUR) TIMES A DAY (BEFORE MEALS AND AT BEDTIME), Normal      ketorolac (TORADOL) 10 mg tablet Take 1 tablet (10 mg total) by mouth every 6 (six) hours as needed for moderate pain, Starting Tue 1/28/2025, Normal      ketotifen (ZADITOR) 0.025 % ophthalmic solution Administer 1 drop to both eyes 2 (two) times a day as needed (for eye discomfort), Starting Mon 8/28/2023, Normal      Lantus 100 UNIT/ML subcutaneous injection INJECT 30 UNITS UNDER THE SKIN EVERY 12 (TWELVE) HOURS, Normal      lidocaine (Lidoderm) 5 % Apply 1 patch topically over 12 hours daily Remove & Discard patch within 12 hours or as directed by MD, Starting Tue 1/28/2025, Normal      lisinopril (ZESTRIL) 20 mg tablet TAKE 1 TABLET (20 MG TOTAL) BY MOUTH DAILY, Starting Fri 2/14/2025, Normal      magnesium chloride-calcium (Slow-Mag) 71.5-119 mg Take 2 tablets by mouth daily for 7 days, Starting Tue 12/10/2024, Until Tue 12/17/2024, Normal      metFORMIN (GLUCOPHAGE) 1000 MG tablet TAKE ONE (1) TABLET BY MOUTH TWICE DAILY WITH FOOD, Normal      methocarbamol (ROBAXIN) 500 mg tablet Take 1 tablet (500 mg total) by mouth 2 (two) times a day, Starting Tue 1/28/2025, Normal      naproxen (Naprosyn) 500 mg tablet Take 1 tablet (500 mg total) by mouth 2 (two) times a day with meals for 7 days, Starting Tue 12/10/2024, Until Tue 12/17/2024, Normal      nicotine (NICODERM CQ) 21 mg/24 hr TD 24 hr patch Place 1 patch on the skin over 24 hours daily, Starting Mon 9/18/2023, Normal      OneTouch Ultra test strip Use 1 each daily as needed (before meals) Use as instructed, Starting Mon 9/18/2023, Normal      prazosin (MINIPRESS) 1 mg capsule TAKE ONE (1) CAPSULE ONCE A DAY AT BEDTIME FOR HYPERTENSION, Normal      Sure Comfort Insulin Syringe 31G  "X 5/16\" 0.3 ML MISC 2 (two) times a day Use as directed, Starting Thu 11/9/2023, Historical Med      topiramate (TOPAMAX) 25 mg tablet TAKE 1 TABLET (25 MG TOTAL) BY MOUTH 2 (TWO) TIMES A DAY, Starting Fri 10/6/2023, Normal      Xarelto 20 MG tablet TAKE 1 TABLET (20 MG TOTAL) BY MOUTH DAILY WITH BREAKFAST, Normal       !! - Potential duplicate medications found. Please discuss with provider.        No discharge procedures on file.  ED SEPSIS DOCUMENTATION   Time reflects when diagnosis was documented in both MDM as applicable and the Disposition within this note       Time User Action Codes Description Comment    2/23/2025  9:02 AM Kevin Gusman [R10.11] Right upper quadrant abdominal pain     2/23/2025  9:02 AM Kevin Gusman [K76.9] Liver lesion     2/23/2025  9:13 AM Kevin Gusman Add [E11.65] Hyperglycemia due to diabetes mellitus (HCC)     2/23/2025  9:14 AM Kevin Gusman Modify [R10.11] Right upper quadrant abdominal pain     2/23/2025  9:14 AM Kevin Gusman Modify [E11.65] Hyperglycemia due to diabetes mellitus (HCC)     2/23/2025  9:14 AM Kevin Gusman Add [E87.1] Hyponatremia                  Kevin Gusman MD  02/23/25 1143    "

## 2025-02-23 NOTE — DISCHARGE INSTRUCTIONS
Follow-up with your PCP and with gastroenterology for continued symptoms.  Return to the emergency department for new or worsening symptoms.

## 2025-02-28 LAB
ATRIAL RATE: 74 BPM
P AXIS: 30 DEGREES
PR INTERVAL: 164 MS
QRS AXIS: 59 DEGREES
QRSD INTERVAL: 92 MS
QT INTERVAL: 352 MS
QTC INTERVAL: 390 MS
T WAVE AXIS: 12 DEGREES
VENTRICULAR RATE: 74 BPM

## 2025-02-28 PROCEDURE — 93010 ELECTROCARDIOGRAM REPORT: CPT | Performed by: INTERNAL MEDICINE

## 2025-03-03 DIAGNOSIS — E11.42 TYPE 2 DIABETES MELLITUS WITH DIABETIC POLYNEUROPATHY, WITH LONG-TERM CURRENT USE OF INSULIN (HCC): ICD-10-CM

## 2025-03-03 DIAGNOSIS — Z79.4 TYPE 2 DIABETES MELLITUS WITH DIABETIC POLYNEUROPATHY, WITH LONG-TERM CURRENT USE OF INSULIN (HCC): ICD-10-CM

## 2025-03-04 DIAGNOSIS — E11.42 TYPE 2 DIABETES MELLITUS WITH DIABETIC POLYNEUROPATHY, WITH LONG-TERM CURRENT USE OF INSULIN (HCC): ICD-10-CM

## 2025-03-04 DIAGNOSIS — Z79.4 TYPE 2 DIABETES MELLITUS WITH DIABETIC POLYNEUROPATHY, WITH LONG-TERM CURRENT USE OF INSULIN (HCC): ICD-10-CM

## 2025-03-05 DIAGNOSIS — Z79.4 TYPE 2 DIABETES MELLITUS WITH DIABETIC POLYNEUROPATHY, WITH LONG-TERM CURRENT USE OF INSULIN (HCC): ICD-10-CM

## 2025-03-05 DIAGNOSIS — I10 HYPERTENSION: ICD-10-CM

## 2025-03-05 DIAGNOSIS — E11.42 TYPE 2 DIABETES MELLITUS WITH DIABETIC POLYNEUROPATHY, WITH LONG-TERM CURRENT USE OF INSULIN (HCC): ICD-10-CM

## 2025-03-05 NOTE — TELEPHONE ENCOUNTER
Called pt but she said her wheelchair can't fit in a car, hence she can't make it for an appointment. She was also feeling unwell and could not schedule an appointment today. I will try again soon.

## 2025-03-06 RX ORDER — PEN NEEDLE, DIABETIC 32GX 5/32"
NEEDLE, DISPOSABLE MISCELLANEOUS
Qty: 100 EACH | Refills: 1 | Status: SHIPPED | OUTPATIENT
Start: 2025-03-06

## 2025-03-07 RX ORDER — PRAZOSIN HYDROCHLORIDE 1 MG/1
CAPSULE ORAL
Qty: 90 CAPSULE | Refills: 1 | Status: SHIPPED | OUTPATIENT
Start: 2025-03-07

## 2025-03-07 RX ORDER — INSULIN GLARGINE 100 [IU]/ML
INJECTION, SOLUTION SUBCUTANEOUS
Qty: 10 ML | Refills: 6 | Status: SHIPPED | OUTPATIENT
Start: 2025-03-07

## 2025-03-07 RX ORDER — SYRING-NEEDL,DISP,INSUL,0.3 ML 28GX1/2"
SYRINGE, EMPTY DISPOSABLE MISCELLANEOUS
Qty: 100 EACH | Refills: 5 | OUTPATIENT
Start: 2025-03-07

## 2025-03-21 ENCOUNTER — HOSPITAL ENCOUNTER (OUTPATIENT)
Dept: NON INVASIVE DIAGNOSTICS | Facility: CLINIC | Age: 40
Discharge: HOME | End: 2025-03-21
Payer: COMMERCIAL

## 2025-03-21 DIAGNOSIS — I73.9 PERIPHERAL ARTERIAL DISEASE (HCC): ICD-10-CM

## 2025-03-21 PROCEDURE — 93926 LOWER EXTREMITY STUDY: CPT

## 2025-03-21 PROCEDURE — 93922 UPR/L XTREMITY ART 2 LEVELS: CPT | Performed by: SURGERY

## 2025-03-21 PROCEDURE — 93926 LOWER EXTREMITY STUDY: CPT | Performed by: SURGERY

## 2025-03-22 ENCOUNTER — RESULTS FOLLOW-UP (OUTPATIENT)
Dept: OTHER | Facility: HOSPITAL | Age: 40
End: 2025-03-22

## 2025-03-25 DIAGNOSIS — J44.9 CHRONIC OBSTRUCTIVE PULMONARY DISEASE, UNSPECIFIED COPD TYPE (HCC): ICD-10-CM

## 2025-03-27 ENCOUNTER — TELEPHONE (OUTPATIENT)
Dept: FAMILY MEDICINE CLINIC | Facility: CLINIC | Age: 40
End: 2025-03-27

## 2025-03-27 ENCOUNTER — TELEPHONE (OUTPATIENT)
Age: 40
End: 2025-03-27

## 2025-03-27 NOTE — TELEPHONE ENCOUNTER
Pt calling in; she stated her electric company faxed over a form this morning for the doctor to review and sign to keep her electric on. Please be on the lookout, TY.

## 2025-03-28 RX ORDER — ALBUTEROL SULFATE 90 UG/1
INHALANT RESPIRATORY (INHALATION)
Qty: 8.5 G | Refills: 2 | OUTPATIENT
Start: 2025-03-28

## 2025-04-01 NOTE — TELEPHONE ENCOUNTER
Please reach out to patient to give any confirmation that the form has been received, and placed in the providers bin.

## 2025-04-01 NOTE — TELEPHONE ENCOUNTER
We have not received any faxes in regard to the form she is requesting. Please have her send it again if possible

## 2025-04-03 NOTE — TELEPHONE ENCOUNTER
Received the paperwork, will be placed into the attendings mailbox to be completed due to patient needing it asap

## 2025-04-03 NOTE — TELEPHONE ENCOUNTER
Patient had called in and was wanting to know if the form has been received. Mentioned to the patient that the form hasn't been received, and to resend.     Patient will call [employer] and have them refax the form today. She did mention that this is urgent as today the company will turn her power off.

## 2025-04-08 DIAGNOSIS — E11.65 UNCONTROLLED TYPE 2 DIABETES MELLITUS WITH HYPERGLYCEMIA (HCC): ICD-10-CM

## 2025-04-08 DIAGNOSIS — Z79.4 TYPE 2 DIABETES MELLITUS WITH DIABETIC POLYNEUROPATHY, WITH LONG-TERM CURRENT USE OF INSULIN (HCC): ICD-10-CM

## 2025-04-08 DIAGNOSIS — E11.42 TYPE 2 DIABETES MELLITUS WITH DIABETIC POLYNEUROPATHY, WITH LONG-TERM CURRENT USE OF INSULIN (HCC): ICD-10-CM

## 2025-04-08 DIAGNOSIS — J44.9 CHRONIC OBSTRUCTIVE PULMONARY DISEASE, UNSPECIFIED COPD TYPE (HCC): ICD-10-CM

## 2025-04-08 RX ORDER — ISOPROPYL ALCOHOL 0.75 G/1
SWAB TOPICAL
Qty: 400 EACH | Refills: 1 | Status: SHIPPED | OUTPATIENT
Start: 2025-04-08

## 2025-04-08 RX ORDER — ALBUTEROL SULFATE 90 UG/1
INHALANT RESPIRATORY (INHALATION)
Qty: 8.5 G | Refills: 5 | Status: SHIPPED | OUTPATIENT
Start: 2025-04-08

## 2025-04-09 RX ORDER — INSULIN LISPRO 100 [IU]/ML
4-16 INJECTION, SOLUTION INTRAVENOUS; SUBCUTANEOUS
Qty: 10 ML | Refills: 6 | Status: SHIPPED | OUTPATIENT
Start: 2025-04-09

## 2025-04-09 RX ORDER — PEN NEEDLE, DIABETIC 32GX 5/32"
NEEDLE, DISPOSABLE MISCELLANEOUS
Qty: 100 EACH | Refills: 1 | Status: SHIPPED | OUTPATIENT
Start: 2025-04-09

## 2025-04-09 RX ORDER — SYRING-NEEDL,DISP,INSUL,0.3 ML 28GX1/2"
SYRINGE, EMPTY DISPOSABLE MISCELLANEOUS
Qty: 100 EACH | Refills: 5 | Status: SHIPPED | OUTPATIENT
Start: 2025-04-09

## 2025-04-10 ENCOUNTER — TELEPHONE (OUTPATIENT)
Age: 40
End: 2025-04-10

## 2025-04-10 NOTE — TELEPHONE ENCOUNTER
Patient called in regards to Eulalia paige for today's appointment.  No notes in chart.  Warm transferred to clerical.

## 2025-04-15 DIAGNOSIS — E11.9 DIABETES (HCC): ICD-10-CM

## 2025-04-15 NOTE — TELEPHONE ENCOUNTER
Called pt and left a VM for pt to call back and schedule a medicare wellness check as well as DM check in order for future refills on metformin to be sent to her pharmacy of choice

## 2025-05-06 DIAGNOSIS — E11.9 DIABETES (HCC): ICD-10-CM

## 2025-05-06 DIAGNOSIS — E11.42 TYPE 2 DIABETES MELLITUS WITH DIABETIC POLYNEUROPATHY, WITH LONG-TERM CURRENT USE OF INSULIN (HCC): ICD-10-CM

## 2025-05-06 DIAGNOSIS — Z79.4 TYPE 2 DIABETES MELLITUS WITH DIABETIC POLYNEUROPATHY, WITH LONG-TERM CURRENT USE OF INSULIN (HCC): ICD-10-CM

## 2025-05-07 RX ORDER — DULAGLUTIDE 1.5 MG/.5ML
INJECTION, SOLUTION SUBCUTANEOUS
Qty: 2 ML | Refills: 5 | OUTPATIENT
Start: 2025-05-07

## 2025-05-13 DIAGNOSIS — Z79.4 TYPE 2 DIABETES MELLITUS WITH DIABETIC POLYNEUROPATHY, WITH LONG-TERM CURRENT USE OF INSULIN (HCC): ICD-10-CM

## 2025-05-13 DIAGNOSIS — E11.42 TYPE 2 DIABETES MELLITUS WITH DIABETIC POLYNEUROPATHY, WITH LONG-TERM CURRENT USE OF INSULIN (HCC): ICD-10-CM

## 2025-05-14 ENCOUNTER — TELEPHONE (OUTPATIENT)
Age: 40
End: 2025-05-14

## 2025-05-14 NOTE — TELEPHONE ENCOUNTER
Pt called and stated that her electric has been shut off and the utility company sent the paperwork to the office last week. I asked her to please have them refax it in case it is not on Dr Prabhakar's desk.     Please confirm if it was rec'd and let pt know.    Thank you!

## 2025-05-14 NOTE — TELEPHONE ENCOUNTER
Called to gather more information, it looks like paper work had been faxed over in April. Called to clarify what paper work is needed

## 2025-05-15 ENCOUNTER — APPOINTMENT (EMERGENCY)
Dept: RADIOLOGY | Facility: HOSPITAL | Age: 40
End: 2025-05-15
Payer: COMMERCIAL

## 2025-05-15 ENCOUNTER — HOSPITAL ENCOUNTER (EMERGENCY)
Facility: HOSPITAL | Age: 40
Discharge: HOME | End: 2025-05-15
Attending: EMERGENCY MEDICINE
Payer: COMMERCIAL

## 2025-05-15 VITALS
DIASTOLIC BLOOD PRESSURE: 75 MMHG | OXYGEN SATURATION: 99 % | TEMPERATURE: 97.9 F | RESPIRATION RATE: 18 BRPM | SYSTOLIC BLOOD PRESSURE: 140 MMHG | HEART RATE: 89 BPM

## 2025-05-15 DIAGNOSIS — R73.9 HYPERGLYCEMIA: ICD-10-CM

## 2025-05-15 DIAGNOSIS — R42 DIZZINESS: ICD-10-CM

## 2025-05-15 DIAGNOSIS — R07.9 CHEST PAIN IN ADULT: ICD-10-CM

## 2025-05-15 LAB
2HR DELTA HS TROPONIN: 1 NG/L
ALBUMIN SERPL BCG-MCNC: 3.8 G/DL (ref 3.5–5)
ALP SERPL-CCNC: 111 U/L (ref 34–104)
ALT SERPL W P-5'-P-CCNC: 17 U/L (ref 7–52)
ANION GAP SERPL CALCULATED.3IONS-SCNC: 11 MMOL/L (ref 4–13)
AST SERPL W P-5'-P-CCNC: 10 U/L (ref 13–39)
ATRIAL RATE: 98 BPM
BASOPHILS # BLD AUTO: 0.05 THOUSANDS/ÂΜL (ref 0–0.1)
BASOPHILS NFR BLD AUTO: 1 % (ref 0–1)
BILIRUB SERPL-MCNC: 0.39 MG/DL (ref 0.2–1)
BILIRUB UR QL STRIP: NEGATIVE
BNP SERPL-MCNC: 22 PG/ML (ref 0–100)
BUN SERPL-MCNC: 16 MG/DL (ref 5–25)
CALCIUM SERPL-MCNC: 9.4 MG/DL (ref 8.4–10.2)
CARDIAC TROPONIN I PNL SERPL HS: 5 NG/L (ref ?–50)
CARDIAC TROPONIN I PNL SERPL HS: 6 NG/L (ref ?–50)
CHLORIDE SERPL-SCNC: 99 MMOL/L (ref 96–108)
CLARITY UR: CLEAR
CO2 SERPL-SCNC: 26 MMOL/L (ref 21–32)
COLOR UR: YELLOW
CREAT SERPL-MCNC: 0.55 MG/DL (ref 0.6–1.3)
EOSINOPHIL # BLD AUTO: 0.11 THOUSAND/ÂΜL (ref 0–0.61)
EOSINOPHIL NFR BLD AUTO: 1 % (ref 0–6)
ERYTHROCYTE [DISTWIDTH] IN BLOOD BY AUTOMATED COUNT: 15.1 % (ref 11.6–15.1)
GFR SERPL CREATININE-BSD FRML MDRD: 119 ML/MIN/1.73SQ M
GLUCOSE SERPL-MCNC: 281 MG/DL (ref 65–140)
GLUCOSE SERPL-MCNC: 347 MG/DL (ref 65–140)
GLUCOSE SERPL-MCNC: 351 MG/DL (ref 65–140)
GLUCOSE UR STRIP-MCNC: ABNORMAL MG/DL
HCG SERPL QL: NEGATIVE
HCT VFR BLD AUTO: 41.7 % (ref 34.8–46.1)
HGB BLD-MCNC: 14.8 G/DL (ref 11.5–15.4)
HGB UR QL STRIP.AUTO: NEGATIVE
IMM GRANULOCYTES # BLD AUTO: 0.07 THOUSAND/UL (ref 0–0.2)
IMM GRANULOCYTES NFR BLD AUTO: 1 % (ref 0–2)
KETONES UR STRIP-MCNC: ABNORMAL MG/DL
LEUKOCYTE ESTERASE UR QL STRIP: NEGATIVE
LIPASE SERPL-CCNC: 10 U/L (ref 11–82)
LYMPHOCYTES # BLD AUTO: 2.7 THOUSANDS/ÂΜL (ref 0.6–4.47)
LYMPHOCYTES NFR BLD AUTO: 26 % (ref 14–44)
MAGNESIUM SERPL-MCNC: 1.9 MG/DL (ref 1.9–2.7)
MCH RBC QN AUTO: 26.4 PG (ref 26.8–34.3)
MCHC RBC AUTO-ENTMCNC: 35.5 G/DL (ref 31.4–37.4)
MCV RBC AUTO: 74 FL (ref 82–98)
MONOCYTES # BLD AUTO: 0.52 THOUSAND/ÂΜL (ref 0.17–1.22)
MONOCYTES NFR BLD AUTO: 5 % (ref 4–12)
NEUTROPHILS # BLD AUTO: 7.02 THOUSANDS/ÂΜL (ref 1.85–7.62)
NEUTS SEG NFR BLD AUTO: 66 % (ref 43–75)
NITRITE UR QL STRIP: NEGATIVE
NRBC BLD AUTO-RTO: 0 /100 WBCS
P AXIS: 63 DEGREES
PH UR STRIP.AUTO: 6.5 [PH]
PLATELET # BLD AUTO: 375 THOUSANDS/UL (ref 149–390)
PMV BLD AUTO: 11 FL (ref 8.9–12.7)
POTASSIUM SERPL-SCNC: 3.9 MMOL/L (ref 3.5–5.3)
PR INTERVAL: 140 MS
PROT SERPL-MCNC: 7.3 G/DL (ref 6.4–8.4)
PROT UR STRIP-MCNC: NEGATIVE MG/DL
QRS AXIS: 64 DEGREES
QRSD INTERVAL: 82 MS
QT INTERVAL: 342 MS
QTC INTERVAL: 436 MS
RBC # BLD AUTO: 5.61 MILLION/UL (ref 3.81–5.12)
SODIUM SERPL-SCNC: 136 MMOL/L (ref 135–147)
SP GR UR STRIP.AUTO: 1.01 (ref 1–1.03)
T WAVE AXIS: 5 DEGREES
UROBILINOGEN UR QL STRIP.AUTO: 0.2 E.U./DL
VENTRICULAR RATE: 98 BPM
WBC # BLD AUTO: 10.47 THOUSAND/UL (ref 4.31–10.16)

## 2025-05-15 PROCEDURE — 99285 EMERGENCY DEPT VISIT HI MDM: CPT | Performed by: EMERGENCY MEDICINE

## 2025-05-15 PROCEDURE — 84703 CHORIONIC GONADOTROPIN ASSAY: CPT | Performed by: EMERGENCY MEDICINE

## 2025-05-15 PROCEDURE — 83735 ASSAY OF MAGNESIUM: CPT | Performed by: EMERGENCY MEDICINE

## 2025-05-15 PROCEDURE — 96372 THER/PROPH/DIAG INJ SC/IM: CPT

## 2025-05-15 PROCEDURE — 83880 ASSAY OF NATRIURETIC PEPTIDE: CPT | Performed by: EMERGENCY MEDICINE

## 2025-05-15 PROCEDURE — 93005 ELECTROCARDIOGRAM TRACING: CPT

## 2025-05-15 PROCEDURE — 82948 REAGENT STRIP/BLOOD GLUCOSE: CPT

## 2025-05-15 PROCEDURE — 71045 X-RAY EXAM CHEST 1 VIEW: CPT

## 2025-05-15 PROCEDURE — 96374 THER/PROPH/DIAG INJ IV PUSH: CPT

## 2025-05-15 PROCEDURE — 96361 HYDRATE IV INFUSION ADD-ON: CPT

## 2025-05-15 PROCEDURE — 99285 EMERGENCY DEPT VISIT HI MDM: CPT

## 2025-05-15 PROCEDURE — 96375 TX/PRO/DX INJ NEW DRUG ADDON: CPT

## 2025-05-15 PROCEDURE — 84484 ASSAY OF TROPONIN QUANT: CPT | Performed by: EMERGENCY MEDICINE

## 2025-05-15 PROCEDURE — 36415 COLL VENOUS BLD VENIPUNCTURE: CPT | Performed by: EMERGENCY MEDICINE

## 2025-05-15 PROCEDURE — 80053 COMPREHEN METABOLIC PANEL: CPT | Performed by: EMERGENCY MEDICINE

## 2025-05-15 PROCEDURE — 93010 ELECTROCARDIOGRAM REPORT: CPT | Performed by: INTERNAL MEDICINE

## 2025-05-15 PROCEDURE — 83690 ASSAY OF LIPASE: CPT | Performed by: EMERGENCY MEDICINE

## 2025-05-15 PROCEDURE — 85025 COMPLETE CBC W/AUTO DIFF WBC: CPT | Performed by: EMERGENCY MEDICINE

## 2025-05-15 PROCEDURE — 81003 URINALYSIS AUTO W/O SCOPE: CPT | Performed by: EMERGENCY MEDICINE

## 2025-05-15 RX ORDER — DIPHENHYDRAMINE HYDROCHLORIDE 50 MG/ML
25 INJECTION, SOLUTION INTRAMUSCULAR; INTRAVENOUS ONCE
Status: COMPLETED | OUTPATIENT
Start: 2025-05-15 | End: 2025-05-15

## 2025-05-15 RX ORDER — DULAGLUTIDE 1.5 MG/.5ML
INJECTION, SOLUTION SUBCUTANEOUS
Qty: 2 ML | Refills: 5 | Status: SHIPPED | OUTPATIENT
Start: 2025-05-15

## 2025-05-15 RX ORDER — DROPERIDOL 2.5 MG/ML
1 INJECTION, SOLUTION INTRAMUSCULAR; INTRAVENOUS ONCE
Status: COMPLETED | OUTPATIENT
Start: 2025-05-15 | End: 2025-05-15

## 2025-05-15 RX ORDER — METOCLOPRAMIDE HYDROCHLORIDE 5 MG/ML
10 INJECTION INTRAMUSCULAR; INTRAVENOUS ONCE
Status: COMPLETED | OUTPATIENT
Start: 2025-05-15 | End: 2025-05-15

## 2025-05-15 RX ORDER — KETOROLAC TROMETHAMINE 30 MG/ML
15 INJECTION, SOLUTION INTRAMUSCULAR; INTRAVENOUS ONCE
Status: COMPLETED | OUTPATIENT
Start: 2025-05-15 | End: 2025-05-15

## 2025-05-15 RX ADMIN — KETOROLAC TROMETHAMINE 15 MG: 30 INJECTION, SOLUTION INTRAMUSCULAR at 04:27

## 2025-05-15 RX ADMIN — METOCLOPRAMIDE 10 MG: 5 INJECTION, SOLUTION INTRAMUSCULAR; INTRAVENOUS at 02:54

## 2025-05-15 RX ADMIN — DIPHENHYDRAMINE HYDROCHLORIDE 25 MG: 50 INJECTION, SOLUTION INTRAMUSCULAR; INTRAVENOUS at 02:54

## 2025-05-15 RX ADMIN — SODIUM CHLORIDE 1000 ML: 0.9 INJECTION, SOLUTION INTRAVENOUS at 02:55

## 2025-05-15 RX ADMIN — INSULIN HUMAN 7 UNITS: 100 INJECTION, SOLUTION PARENTERAL at 03:47

## 2025-05-15 NOTE — ED PROVIDER NOTES
Time reflects when diagnosis was documented in both MDM as applicable and the Disposition within this note       Time User Action Codes Description Comment    5/15/2025  3:03 AM Nasir Reyes Add [R42] Dizziness     5/15/2025  3:03 AM Nasir Reyes Add [R73.9] Hyperglycemia     5/15/2025  3:03 AM Nasir Reyes Modify [R42] Dizziness     5/15/2025  4:29 AM Nasir Reeys Add [R07.9] Chest pain in adult     5/15/2025  4:29 AM Nasir Reyes Modify [R07.9] Chest pain in adult           ED Disposition       ED Disposition   Discharge    Condition   Stable    Date/Time   Thu May 15, 2025  5:15 AM    Comment   Tony Roberto discharge to home/self care.                   Assessment & Plan       Medical Decision Making  Patient is a 39-year-old female seen in the emergency department with concern for dizziness, hyperglycemia.  Fingerstick blood glucose was elevated at 347.  EKG was obtained and noted, which showed no definite evidence of arrhythmia or ischemia.  Chest x-ray showed no infiltrate or pneumothorax.  Patient was treated with medication for symptom control, with good effect.  Laboratory evaluation remarkable for elevated white blood cell count of 10.47, elevated red blood cell count of 5.61, low MCV of 74, elevated glucose of 351, elevated alk phos of 111, troponins of 5, 6. Evaluation is not consistent with ACS, DKA, or pancreatitis.  Evaluation appears consistent with hyperglycemia.  Plan to have patient follow up with PCP/outpatient providers.  Patient stable for discharge home.  Discharge instructions were reviewed with patient.    Problems Addressed:  Chest pain in adult: acute illness or injury  Dizziness: acute illness or injury  Hyperglycemia: acute illness or injury    Amount and/or Complexity of Data Reviewed  Labs: ordered. Decision-making details documented in ED Course.  Radiology: ordered and independent interpretation performed. Decision-making details documented in ED Course.  ECG/medicine  tests: ordered and independent interpretation performed. Decision-making details documented in ED Course.    Risk  OTC drugs.  Prescription drug management.             Medications   droperidol (FOR EMS ONLY) (INAPSINE) 2.5 mg/mL injection 2.5 mg (0 mg Does not apply Given to EMS 5/15/25 0245)   metoclopramide (REGLAN) injection 10 mg (10 mg Intravenous Given 5/15/25 0254)   diphenhydrAMINE (BENADRYL) injection 25 mg (25 mg Intravenous Given 5/15/25 0254)   sodium chloride 0.9 % bolus 1,000 mL (0 mL Intravenous Stopped 5/15/25 0428)   insulin regular (HumuLIN R,NovoLIN R) injection 7 Units (7 Units Subcutaneous Given 5/15/25 0347)   ketorolac (TORADOL) injection 15 mg (15 mg Intravenous Given 5/15/25 0427)       ED Risk Strat Scores   HEART Risk Score      Flowsheet Row Most Recent Value   Heart Score Risk Calculator    History 0 Filed at: 05/15/2025 0428   ECG 1 Filed at: 05/15/2025 0428   Age 0 Filed at: 05/15/2025 0428   Risk Factors 2 Filed at: 05/15/2025 0428   Troponin 0 Filed at: 05/15/2025 0428   HEART Score 3 Filed at: 05/15/2025 0428          HEART Risk Score      Flowsheet Row Most Recent Value   Heart Score Risk Calculator    History 0 Filed at: 05/15/2025 0428   ECG 1 Filed at: 05/15/2025 0428   Age 0 Filed at: 05/15/2025 0428   Risk Factors 2 Filed at: 05/15/2025 0428   Troponin 0 Filed at: 05/15/2025 0428   HEART Score 3 Filed at: 05/15/2025 0428                      No data recorded        SBIRT 22yo+      Flowsheet Row Most Recent Value   Initial Alcohol Screen: US AUDIT-C     1. How often do you have a drink containing alcohol? 0 Filed at: 05/15/2025 0250   2. How many drinks containing alcohol do you have on a typical day you are drinking?  0 Filed at: 05/15/2025 0250   3a. Male UNDER 65: How often do you have five or more drinks on one occasion? 0 Filed at: 05/15/2025 0250   3b. FEMALE Any Age, or MALE 65+: How often do you have 4 or more drinks on one occassion? 0 Filed at: 05/15/2025 2700    Audit-C Score 0 Filed at: 05/15/2025 025   CRISTÓBAL: How many times in the past year have you...    Used an illegal drug or used a prescription medication for non-medical reasons? Never Filed at: 05/15/2025 0250                            History of Present Illness       Chief Complaint   Patient presents with    Hyperglycemia - Symptomatic     Hyperglycemia, headaches, dizziness, R foot tingling, vomiting for a few days.        Past Medical History:   Diagnosis Date    Asthma     Atherosclerosis     Bipolar 1 disorder (HCC)     COPD (chronic obstructive pulmonary disease) (HCC)     Depression     Diabetes mellitus (HCC)     Hypertension     Psychiatric disorder     cutting history    PTSD (post-traumatic stress disorder)     Schizoaffective disorder (HCC)     Tendonitis       Past Surgical History:   Procedure Laterality Date    AMPUTATION ABOVE KNEE (AKA) Left 3/29/2023    Procedure: AMPUTATION ABOVE KNEE (AKA);  Surgeon: Savi Langford MD;  Location: [facility];  Service: Vascular    BYPASS FEMORAL-POPLITEAL Left 2021    Procedure: Left Common Femoral Below Knee to Popliteal Bypass with Insitu GSV graft.  Left Lower Extremity Angiogram;  Surgeon: Savi Langford MD;  Location: [facility];  Service: Vascular     SECTION      EAR SURGERY      FL LUMBAR PUNCTURE DIAGNOSTIC  10/25/2018    IR LOWER EXTREMITY ANGIOGRAM  2021    IR LOWER EXTREMITY ANGIOGRAM  2021    IR LOWER EXTREMITY ANGIOGRAM  3/24/2023    MA SLCTV CATHJ 3RD+ ORD SLCTV ABDL PEL/LXTR BRNCH Left 3/24/2023    Procedure: Left leg angiogram;  Surgeon: Byron Wahl DO;  Location: [facility];  Service: Vascular    TUBAL LIGATION        Family History   Problem Relation Age of Onset    Hypertension Mother     Diabetes Mother     HIV Mother     Heart disease Mother     No Known Problems Father       Social History[1]   E-Cigarette/Vaping    E-Cigarette Use Never User       E-Cigarette/Vaping Substances    Nicotine No     THC No     CBD No      Flavoring No     Other No     Unknown No       I have reviewed and agree with the history as documented.     Patient is a 39-year-old female seen in the emergency department with concern for dizziness over approximately past 2 days associated with hyperglycemia with blood glucose as high as 500s at home.  Patient notes some associated nausea and vomiting and intermittent chest discomfort.  Patient notes no fever, shortness of breath, abdominal pain, weakness, numbness, tingling.  Patient notes dizziness worse with exposure to bright light.  Patient notes no other definite clear exacerbating or alleviating factors for her symptoms.        Review of Systems   Constitutional:  Negative for chills and fever.   HENT:  Negative for ear pain and trouble swallowing.    Eyes:  Negative for pain and visual disturbance.   Respiratory:  Negative for cough and shortness of breath.    Cardiovascular:  Positive for chest pain. Negative for palpitations.   Gastrointestinal:  Positive for nausea and vomiting. Negative for abdominal pain.   Genitourinary:  Negative for decreased urine volume and difficulty urinating.   Musculoskeletal:  Negative for gait problem and neck stiffness.   Skin:  Negative for color change and rash.   Neurological:  Positive for dizziness. Negative for seizures, syncope and headaches.   Psychiatric/Behavioral:  Negative for agitation and confusion.    All other systems reviewed and are negative.          Objective       ED Triage Vitals   Temperature Pulse Blood Pressure Respirations SpO2 Patient Position - Orthostatic VS   05/15/25 0251 05/15/25 0247 05/15/25 0247 05/15/25 0247 05/15/25 0247 --   97.9 °F (36.6 °C) 101 139/81 20 98 %       Temp Source Heart Rate Source BP Location FiO2 (%) Pain Score    05/15/25 0251 05/15/25 0247 -- -- 05/15/25 0427    Oral Monitor   7      Vitals      Date and Time Temp Pulse SpO2 Resp BP Pain Score FACES Pain Rating User   05/15/25 0427 -- -- -- -- -- 7 -- DS    05/15/25 0251 97.9 °F (36.6 °C) -- -- -- -- -- --    05/15/25 0247 -- 101 98 % 20 139/81 -- --             Physical Exam  Vitals and nursing note reviewed.   Constitutional:       General: She is not in acute distress.     Appearance: She is well-developed.   HENT:      Head: Normocephalic and atraumatic.      Right Ear: External ear normal.      Left Ear: External ear normal.      Nose: Nose normal.      Mouth/Throat:      Pharynx: Oropharynx is clear.     Eyes:      General: No scleral icterus.     Conjunctiva/sclera: Conjunctivae normal.       Cardiovascular:      Rate and Rhythm: Regular rhythm. Tachycardia present.      Heart sounds: No murmur heard.  Pulmonary:      Effort: Pulmonary effort is normal. No respiratory distress.      Breath sounds: Normal breath sounds.   Abdominal:      General: There is no distension.      Palpations: Abdomen is soft.      Tenderness: There is no abdominal tenderness.     Musculoskeletal:         General: No deformity or signs of injury.      Cervical back: Normal range of motion and neck supple.     Skin:     General: Skin is warm and dry.     Neurological:      General: No focal deficit present.      Mental Status: She is alert.      Cranial Nerves: No cranial nerve deficit.      Sensory: No sensory deficit.     Psychiatric:         Mood and Affect: Mood normal.         Thought Content: Thought content normal.         Results Reviewed       Procedure Component Value Units Date/Time    HS Troponin I 2hr [[id number]]  (Normal) Collected: 05/15/25 0428    Lab Status: Final result Specimen: Blood from Arm, Right Updated: 05/15/25 0513     hs TnI 2hr 6 ng/L      Delta 2hr hsTnI 1 ng/L     UA w Reflex to Microscopic w Reflex to Culture [[id number]]  (Abnormal) Collected: 05/15/25 0434    Lab Status: Final result Specimen: Urine, Clean Catch Updated: 05/15/25 0500     Color, UA Yellow     Clarity, UA Clear     Specific Gravity, UA 1.015     pH, UA 6.5     Leukocytes, UA Negative      Nitrite, UA Negative     Protein, UA Negative mg/dl      Glucose, UA 3+ mg/dl      Ketones, UA 15 (1+) mg/dl      Urobilinogen, UA 0.2 E.U./dl      Bilirubin, UA Negative     Occult Blood, UA Negative    Fingerstick Glucose (POCT) [[id number]]  (Abnormal) Collected: 05/15/25 0426    Lab Status: Final result Specimen: Blood Updated: 05/15/25 0427     POC Glucose 281 mg/dl     HS Troponin I 4hr [[id number]]     Lab Status: No result Specimen: Blood     B-Type Natriuretic Peptide(BNP) [[id number]]  (Normal) Collected: 05/15/25 0253    Lab Status: Final result Specimen: Blood from Arm, Right Updated: 05/15/25 0352     BNP 22 pg/mL     hCG, qualitative pregnancy [[id number]]  (Normal) Collected: 05/15/25 0253    Lab Status: Final result Specimen: Blood from Arm, Right Updated: 05/15/25 0330     Preg, Serum Negative    HS Troponin 0hr (reflex protocol) [[id number]]  (Normal) Collected: 05/15/25 0253    Lab Status: Final result Specimen: Blood from Arm, Right Updated: 05/15/25 0329     hs TnI 0hr 5 ng/L     Comprehensive metabolic panel [[id number]]  (Abnormal) Collected: 05/15/25 0253    Lab Status: Final result Specimen: Blood from Arm, Right Updated: 05/15/25 0326     Sodium 136 mmol/L      Potassium 3.9 mmol/L      Chloride 99 mmol/L      CO2 26 mmol/L      ANION GAP 11 mmol/L      BUN 16 mg/dL      Creatinine 0.55 mg/dL      Glucose 351 mg/dL      Calcium 9.4 mg/dL      AST 10 U/L      ALT 17 U/L      Alkaline Phosphatase 111 U/L      Total Protein 7.3 g/dL      Albumin 3.8 g/dL      Total Bilirubin 0.39 mg/dL      eGFR 119 ml/min/1.73sq m     Narrative:      [employer] guidelines for Chronic Kidney Disease (CKD):     Stage 1 with normal or high GFR (GFR > 90 mL/min/1.73 square meters)    Stage 2 Mild CKD (GFR = 60-89 mL/min/1.73 square meters)    Stage 3A Moderate CKD (GFR = 45-59 mL/min/1.73 square meters)    Stage 3B Moderate CKD (GFR = 30-44 mL/min/1.73 square meters)    Stage 4 Severe CKD  (GFR = 15-29 mL/min/1.73 square meters)    Stage 5 End Stage CKD (GFR <15 mL/min/1.73 square meters)  Note: GFR calculation is accurate only with a steady state creatinine    Magnesium [[id number]]  (Normal) Collected: 05/15/25 0253    Lab Status: Final result Specimen: Blood from Arm, Right Updated: 05/15/25 0326     Magnesium 1.9 mg/dL     Lipase [[id number]]  (Abnormal) Collected: 05/15/25 0253    Lab Status: Final result Specimen: Blood from Arm, Right Updated: 05/15/25 0326     Lipase 10 u/L     CBC and differential [[id number]]  (Abnormal) Collected: 05/15/25 0253    Lab Status: Final result Specimen: Blood from Arm, Right Updated: 05/15/25 0303     WBC 10.47 Thousand/uL      RBC 5.61 Million/uL      Hemoglobin 14.8 g/dL      Hematocrit 41.7 %      MCV 74 fL      MCH 26.4 pg      MCHC 35.5 g/dL      RDW 15.1 %      MPV 11.0 fL      Platelets 375 Thousands/uL      nRBC 0 /100 WBCs      Segmented % 66 %      Immature Grans % 1 %      Lymphocytes % 26 %      Monocytes % 5 %      Eosinophils Relative 1 %      Basophils Relative 1 %      Absolute Neutrophils 7.02 Thousands/µL      Absolute Immature Grans 0.07 Thousand/uL      Absolute Lymphocytes 2.70 Thousands/µL      Absolute Monocytes 0.52 Thousand/µL      Eosinophils Absolute 0.11 Thousand/µL      Basophils Absolute 0.05 Thousands/µL     Fingerstick Glucose (POCT) [[id number]]  (Abnormal) Collected: 05/15/25 0246    Lab Status: Final result Specimen: Blood Updated: 05/15/25 0247     POC Glucose 347 mg/dl             XR chest 1 view portable   ED Interpretation by Nasir Reyes MD (05/15 0407)   No infiltrate or pneumothorax          ECG 12 Lead Documentation Only    Date/Time: 5/15/2025 3:02 AM    Performed by: Nasir Reyes MD  Authorized by: Nasir Reyes MD    Indications / Diagnosis:  Chest pain  ECG reviewed by me, the ED Provider: yes    Patient location:  ED  Rate:     ECG rate:  98    ECG rate assessment: normal    Rhythm:     Rhythm: sinus rhythm   "  QRS:     QRS axis:  Normal  T waves:     T waves: non-specific    Comments:      Normal sinus rhythm at 98, normal axis, , QRS 82, QTc 436, nonspecific T-wave abnormality, no definite evidence of acute ischemia      ED Medication and Procedure Management   Prior to Admission Medications   Prescriptions Last Dose Informant Patient Reported? Taking?   Advair -21 MCG/ACT inhaler  Self No No   Sig: Inhale 2 puffs 2 (two) times a day Rinse mouth after use   Alcohol Swabs (B-D SINGLE USE SWABS REGULAR) PADS   No No   Sig: USE 3-4 TIMES DAILY WITH PEN   B-D INS SYR MICROFINE .5CC/28G 28G X 1/2\" 0.5 ML MISC   No No   Sig: USE AS DIRECTED   BD Pen Needle Leann 2nd Gen 32G X 4 MM MISC   No No   Sig: INJECT UNDER THE SKIN 4 (FOUR) TIMES A DAY (BEFORE MEALS AND AT BEDTIME)   Blood Glucose Monitoring Suppl (ONE TOUCH ULTRA 2) w/Device KIT  Self No No   Sig: BY DEVICE ROUTE 2 (TWO) TIMES A DAY CHECK BLOOD SUGARS AND RECORD VALUE AND TIME OF DAY TWICE A DAY   Blood Pressure Monitoring (Blood Pressure Monitor Automat) KATLYN  Self No No   Sig: Use Daily as Directed   Patient not taking: Reported on 2024   Comfort EZ Pen Needles 33G X 4 MM MISC  Self No No   Sig: USE AS DIRECTED   DULoxetine (CYMBALTA) 30 mg delayed release capsule   No No   Sig: TAKE 2 CAPSULES (60 MG TOTAL) BY MOUTH DAILY   Dulaglutide (Trulicity) 1.5 MG/0.5ML SOAJ   No No   Sig: AS DIRECTED WEEKLY   Global Inject Ease Lancets 30G MISC  Self No No   Sig: USE 3 (THREE) TIMES A DAY   Lantus 100 UNIT/ML subcutaneous injection   No No   Sig: INJECT 30 UNITS UNDER THE SKIN EVERY 12 (TWELVE) HOURS   OneTouch Ultra test strip  Self No No   Sig: Use 1 each daily as needed (before meals) Use as instructed   Sure Comfort Insulin Syringe 31G X 5/16\" 0.3 ML MISC  Self Yes No   Si (two) times a day Use as directed   Xarelto 20 MG tablet   No No   Sig: TAKE 1 TABLET (20 MG TOTAL) BY MOUTH DAILY WITH BREAKFAST   acetaminophen (TYLENOL) 500 mg tablet   No No "   Sig: Take 2 tablets (1,000 mg total) by mouth every 8 (eight) hours as needed for mild pain   albuterol (PROVENTIL HFA,VENTOLIN HFA) 90 mcg/act inhaler   No No   Sig: INHALE 2 PUFFS EVERY 4 HOURS AS NEEDED FOR WHEEZING OR SHORTNESS OF BREATH   aspirin 81 mg chewable tablet   No No   Sig: CHEW 1 TABLET (81 MG TOTAL) DAILY   atorvastatin (LIPITOR) 40 mg tablet   No No   Sig: TAKE 1 TABLET (40 MG TOTAL) BY MOUTH DAILY WITH DINNER   dulaglutide (Trulicity) 0.75 MG/0.5ML injection  Self No No   Sig: Inject 0.5 mL (0.75 mg total) under the skin every 7 days   glycerin-hypromellose- (ARTIFICIAL TEARS) 0.2-0.2-1 % SOLN  Self No No   Sig: Administer 2 drops to both eyes every 6 (six) hours as needed (for eye discomfort)   hydrOXYzine HCL (ATARAX) 50 mg tablet  Self No No   Sig: TAKE 1 TABLET (50 MG TOTAL) BY MOUTH DAILY AT BEDTIME AS NEEDED FOR ITCHING OR ANXIETY   ibuprofen (MOTRIN) 600 mg tablet   No No   Sig: Take 1 tablet (600 mg total) by mouth every 8 (eight) hours as needed for mild pain   insulin lispro (HumALOG/ADMELOG) 100 units/mL injection   No No   Sig: INJECT 4-16 UNITS UNDER THE SKIN 3 (THREE) TIMES A DAY BEFORE MEALS   ketorolac (TORADOL) 10 mg tablet   No No   Sig: Take 1 tablet (10 mg total) by mouth every 6 (six) hours as needed for moderate pain   ketotifen (ZADITOR) 0.025 % ophthalmic solution  Self No No   Sig: Administer 1 drop to both eyes 2 (two) times a day as needed (for eye discomfort)   lidocaine (Lidoderm) 5 %   No No   Sig: Apply 1 patch topically over 12 hours daily Remove & Discard patch within 12 hours or as directed by MD   lisinopril (ZESTRIL) 20 mg tablet   No No   Sig: TAKE 1 TABLET (20 MG TOTAL) BY MOUTH DAILY   magnesium chloride-calcium (Slow-Mag) 71.5-119 mg   No No   Sig: Take 2 tablets by mouth daily for 7 days   metFORMIN (GLUCOPHAGE) 1000 MG tablet   No No   Sig: TAKE ONE (1) TABLET BY MOUTH TWICE DAILY WITH FOOD   methocarbamol (ROBAXIN) 500 mg tablet   No No   Sig:  "Take 1 tablet (500 mg total) by mouth 2 (two) times a day   naproxen (Naprosyn) 500 mg tablet   No No   Sig: Take 1 tablet (500 mg total) by mouth 2 (two) times a day with meals for 7 days   nicotine (NICODERM CQ) 21 mg/24 hr TD 24 hr patch  Self No No   Sig: Place 1 patch on the skin over 24 hours daily   Patient not taking: Reported on 9/11/2024   prazosin (MINIPRESS) 1 mg capsule   No No   Sig: TAKE ONE (1) CAPSULE ONCE A DAY AT BEDTIME FOR HYPERTENSION   topiramate (TOPAMAX) 25 mg tablet  Self No No   Sig: TAKE 1 TABLET (25 MG TOTAL) BY MOUTH 2 (TWO) TIMES A DAY      Facility-Administered Medications: None     Patient's Medications   Discharge Prescriptions    No medications on file       ED SEPSIS DOCUMENTATION   Time reflects when diagnosis was documented in both MDM as applicable and the Disposition within this note       Time User Action Codes Description Comment    5/15/2025  3:03 AM Nasir Reyes [R42] Dizziness     5/15/2025  3:03 AM Nasir Reyes [R73.9] Hyperglycemia     5/15/2025  3:03 AM Nasir Reyes [R42] Dizziness     5/15/2025  4:29 AM Nasir Reyes [R07.9] Chest pain in adult     5/15/2025  4:29 AM Nasir Reyes [R07.9] Chest pain in adult                    Nasir Reyes MD  05/15/25 0519         [1]   Social History  Tobacco Use    Smoking status: Every Day     Current packs/day: 0.50     Average packs/day: 1 pack/day for 22.0 years (21.0 ttl pk-yrs)     Types: Cigarettes     Start date: 3/24/2003     Last attempt to quit: 3/24/2023     Passive exposure: Current    Smokeless tobacco: Former     Quit date: 4/29/2022   Vaping Use    Vaping status: Never Used   Substance Use Topics    Alcohol use: Not Currently     Comment: not currently    Drug use: Yes     Types: \"Crack\" cocaine     Comment: 1 years clean from crack cocaine since 2022        Nasir Reyes MD  05/15/25 0519    "

## 2025-05-15 NOTE — TELEPHONE ENCOUNTER
Patient called to see if [facility] fax for May sent yesterday was received.  While waiting for confirmation from clinical team patient disconnected call.  Office will call patient back after checking.

## 2025-05-15 NOTE — TELEPHONE ENCOUNTER
That was sent over in April, I will try resending to the requested fax incase they never received it

## 2025-05-26 ENCOUNTER — HOSPITAL ENCOUNTER (INPATIENT)
Facility: HOSPITAL | Age: 40
LOS: 6 days | Discharge: HOME | DRG: 038 | End: 2025-06-02
Attending: EMERGENCY MEDICINE | Admitting: STUDENT IN AN ORGANIZED HEALTH CARE EDUCATION/TRAINING PROGRAM
Payer: COMMERCIAL

## 2025-05-26 ENCOUNTER — APPOINTMENT (OUTPATIENT)
Dept: RADIOLOGY | Facility: HOSPITAL | Age: 40
DRG: 038 | End: 2025-05-26
Payer: COMMERCIAL

## 2025-05-26 ENCOUNTER — APPOINTMENT (EMERGENCY)
Dept: CT IMAGING | Facility: HOSPITAL | Age: 40
DRG: 038 | End: 2025-05-26
Payer: COMMERCIAL

## 2025-05-26 DIAGNOSIS — Z86.73 HISTORY OF CVA (CEREBROVASCULAR ACCIDENT): ICD-10-CM

## 2025-05-26 DIAGNOSIS — Z79.4 TYPE 2 DIABETES MELLITUS WITH DIABETIC POLYNEUROPATHY, WITH LONG-TERM CURRENT USE OF INSULIN (HCC): ICD-10-CM

## 2025-05-26 DIAGNOSIS — J44.1 CHRONIC OBSTRUCTIVE PULMONARY DISEASE WITH ACUTE EXACERBATION (HCC): ICD-10-CM

## 2025-05-26 DIAGNOSIS — S91.301A WOUND OF RIGHT FOOT: ICD-10-CM

## 2025-05-26 DIAGNOSIS — I10 HYPERTENSION: ICD-10-CM

## 2025-05-26 DIAGNOSIS — I73.9 PERIPHERAL ARTERIAL DISEASE (HCC): ICD-10-CM

## 2025-05-26 DIAGNOSIS — E11.9 DIABETES (HCC): ICD-10-CM

## 2025-05-26 DIAGNOSIS — I73.9 PAD (PERIPHERAL ARTERY DISEASE) (HCC): ICD-10-CM

## 2025-05-26 DIAGNOSIS — Z89.612 S/P AKA (ABOVE KNEE AMPUTATION) UNILATERAL, LEFT (HCC): ICD-10-CM

## 2025-05-26 DIAGNOSIS — R20.0 LEFT ARM NUMBNESS: ICD-10-CM

## 2025-05-26 DIAGNOSIS — G56.92 COMPRESSION NEUROPATHY OF LEFT UPPER EXTREMITY: ICD-10-CM

## 2025-05-26 DIAGNOSIS — R29.90 STROKE-LIKE SYMPTOMS: ICD-10-CM

## 2025-05-26 DIAGNOSIS — M79.642 LEFT HAND PAIN: Primary | ICD-10-CM

## 2025-05-26 DIAGNOSIS — E11.42 TYPE 2 DIABETES MELLITUS WITH DIABETIC POLYNEUROPATHY, WITH LONG-TERM CURRENT USE OF INSULIN (HCC): ICD-10-CM

## 2025-05-26 DIAGNOSIS — F31.75 BIPOLAR DISORDER, IN PARTIAL REMISSION, MOST RECENT EPISODE DEPRESSED (HCC): ICD-10-CM

## 2025-05-26 DIAGNOSIS — J44.9 CHRONIC OBSTRUCTIVE PULMONARY DISEASE, UNSPECIFIED COPD TYPE (HCC): ICD-10-CM

## 2025-05-26 PROBLEM — Z87.898 HISTORY OF SUBSTANCE USE: Status: ACTIVE | Noted: 2025-05-26

## 2025-05-26 PROBLEM — E87.1 HYPONATREMIA: Status: ACTIVE | Noted: 2025-05-26

## 2025-05-26 LAB
2HR DELTA HS TROPONIN: 0 NG/L
ALBUMIN SERPL BCG-MCNC: 3.6 G/DL (ref 3.5–5)
ALP SERPL-CCNC: 126 U/L (ref 34–104)
ALT SERPL W P-5'-P-CCNC: 16 U/L (ref 7–52)
AMPHETAMINES SERPL QL SCN: NEGATIVE
ANION GAP SERPL CALCULATED.3IONS-SCNC: 8 MMOL/L (ref 4–13)
AST SERPL W P-5'-P-CCNC: 11 U/L (ref 13–39)
BACTERIA UR QL AUTO: ABNORMAL /HPF
BARBITURATES UR QL: NEGATIVE
BASOPHILS # BLD AUTO: 0.05 THOUSANDS/ÂΜL (ref 0–0.1)
BASOPHILS NFR BLD AUTO: 1 % (ref 0–1)
BENZODIAZ UR QL: NEGATIVE
BILIRUB SERPL-MCNC: 0.23 MG/DL (ref 0.2–1)
BILIRUB UR QL STRIP: NEGATIVE
BUN SERPL-MCNC: 14 MG/DL (ref 5–25)
CALCIUM SERPL-MCNC: 8.4 MG/DL (ref 8.4–10.2)
CARDIAC TROPONIN I PNL SERPL HS: 6 NG/L (ref ?–50)
CARDIAC TROPONIN I PNL SERPL HS: 6 NG/L (ref ?–50)
CHLORIDE SERPL-SCNC: 100 MMOL/L (ref 96–108)
CHOLEST SERPL-MCNC: 237 MG/DL (ref ?–200)
CLARITY UR: CLEAR
CO2 SERPL-SCNC: 24 MMOL/L (ref 21–32)
COCAINE UR QL: POSITIVE
COLOR UR: YELLOW
CREAT SERPL-MCNC: 0.53 MG/DL (ref 0.6–1.3)
EOSINOPHIL # BLD AUTO: 0.34 THOUSAND/ÂΜL (ref 0–0.61)
EOSINOPHIL NFR BLD AUTO: 3 % (ref 0–6)
ERYTHROCYTE [DISTWIDTH] IN BLOOD BY AUTOMATED COUNT: 15 % (ref 11.6–15.1)
FENTANYL UR QL SCN: NEGATIVE
GFR SERPL CREATININE-BSD FRML MDRD: 120 ML/MIN/1.73SQ M
GLUCOSE SERPL-MCNC: 222 MG/DL (ref 65–140)
GLUCOSE SERPL-MCNC: 341 MG/DL (ref 65–140)
GLUCOSE SERPL-MCNC: 354 MG/DL (ref 65–140)
GLUCOSE SERPL-MCNC: 362 MG/DL (ref 65–140)
GLUCOSE SERPL-MCNC: 393 MG/DL (ref 65–140)
GLUCOSE SERPL-MCNC: 394 MG/DL (ref 65–140)
GLUCOSE UR STRIP-MCNC: ABNORMAL MG/DL
HCT VFR BLD AUTO: 37.6 % (ref 34.8–46.1)
HDLC SERPL-MCNC: 50 MG/DL
HGB BLD-MCNC: 13.4 G/DL (ref 11.5–15.4)
HGB UR QL STRIP.AUTO: ABNORMAL
HYDROCODONE UR QL SCN: NEGATIVE
IMM GRANULOCYTES # BLD AUTO: 0.06 THOUSAND/UL (ref 0–0.2)
IMM GRANULOCYTES NFR BLD AUTO: 1 % (ref 0–2)
KETONES UR STRIP-MCNC: NEGATIVE MG/DL
LACTATE SERPL-SCNC: 1.5 MMOL/L (ref 0.5–2)
LDLC SERPL CALC-MCNC: 165 MG/DL (ref 0–100)
LEUKOCYTE ESTERASE UR QL STRIP: NEGATIVE
LYMPHOCYTES # BLD AUTO: 3.23 THOUSANDS/ÂΜL (ref 0.6–4.47)
LYMPHOCYTES NFR BLD AUTO: 30 % (ref 14–44)
MAGNESIUM SERPL-MCNC: 1.9 MG/DL (ref 1.9–2.7)
MCH RBC QN AUTO: 26.2 PG (ref 26.8–34.3)
MCHC RBC AUTO-ENTMCNC: 35.6 G/DL (ref 31.4–37.4)
MCV RBC AUTO: 73 FL (ref 82–98)
METHADONE UR QL: NEGATIVE
MONOCYTES # BLD AUTO: 0.43 THOUSAND/ÂΜL (ref 0.17–1.22)
MONOCYTES NFR BLD AUTO: 4 % (ref 4–12)
NEUTROPHILS # BLD AUTO: 6.78 THOUSANDS/ÂΜL (ref 1.85–7.62)
NEUTS SEG NFR BLD AUTO: 61 % (ref 43–75)
NITRITE UR QL STRIP: NEGATIVE
NON-SQ EPI CELLS URNS QL MICRO: ABNORMAL /HPF
NONHDLC SERPL-MCNC: 187 MG/DL
NRBC BLD AUTO-RTO: 0 /100 WBCS
OPIATES UR QL SCN: POSITIVE
OXYCODONE+OXYMORPHONE UR QL SCN: NEGATIVE
PCP UR QL: NEGATIVE
PH UR STRIP.AUTO: 6 [PH]
PLATELET # BLD AUTO: 393 THOUSANDS/UL (ref 149–390)
PMV BLD AUTO: 10.4 FL (ref 8.9–12.7)
POTASSIUM SERPL-SCNC: 3.7 MMOL/L (ref 3.5–5.3)
PROT SERPL-MCNC: 6.6 G/DL (ref 6.4–8.4)
PROT UR STRIP-MCNC: NEGATIVE MG/DL
RBC # BLD AUTO: 5.12 MILLION/UL (ref 3.81–5.12)
RBC #/AREA URNS AUTO: ABNORMAL /HPF
SODIUM SERPL-SCNC: 132 MMOL/L (ref 135–147)
SP GR UR STRIP.AUTO: 1.01 (ref 1–1.03)
THC UR QL: POSITIVE
TRIGL SERPL-MCNC: 112 MG/DL (ref ?–150)
TSH SERPL DL<=0.05 MIU/L-ACNC: 1.74 UIU/ML (ref 0.45–4.5)
UROBILINOGEN UR QL STRIP.AUTO: 0.2 E.U./DL
WBC # BLD AUTO: 10.89 THOUSAND/UL (ref 4.31–10.16)
WBC #/AREA URNS AUTO: ABNORMAL /HPF

## 2025-05-26 PROCEDURE — 36415 COLL VENOUS BLD VENIPUNCTURE: CPT | Performed by: EMERGENCY MEDICINE

## 2025-05-26 PROCEDURE — 83605 ASSAY OF LACTIC ACID: CPT | Performed by: EMERGENCY MEDICINE

## 2025-05-26 PROCEDURE — 80061 LIPID PANEL: CPT | Performed by: PHYSICIAN ASSISTANT

## 2025-05-26 PROCEDURE — 82948 REAGENT STRIP/BLOOD GLUCOSE: CPT

## 2025-05-26 PROCEDURE — 96375 TX/PRO/DX INJ NEW DRUG ADDON: CPT

## 2025-05-26 PROCEDURE — 84443 ASSAY THYROID STIM HORMONE: CPT | Performed by: PHYSICIAN ASSISTANT

## 2025-05-26 PROCEDURE — 85025 COMPLETE CBC W/AUTO DIFF WBC: CPT | Performed by: EMERGENCY MEDICINE

## 2025-05-26 PROCEDURE — 96376 TX/PRO/DX INJ SAME DRUG ADON: CPT

## 2025-05-26 PROCEDURE — 99223 1ST HOSP IP/OBS HIGH 75: CPT | Performed by: INTERNAL MEDICINE

## 2025-05-26 PROCEDURE — 73620 X-RAY EXAM OF FOOT: CPT

## 2025-05-26 PROCEDURE — 83735 ASSAY OF MAGNESIUM: CPT | Performed by: EMERGENCY MEDICINE

## 2025-05-26 PROCEDURE — 73600 X-RAY EXAM OF ANKLE: CPT

## 2025-05-26 PROCEDURE — 70496 CT ANGIOGRAPHY HEAD: CPT

## 2025-05-26 PROCEDURE — 84484 ASSAY OF TROPONIN QUANT: CPT | Performed by: EMERGENCY MEDICINE

## 2025-05-26 PROCEDURE — 81001 URINALYSIS AUTO W/SCOPE: CPT | Performed by: PHYSICIAN ASSISTANT

## 2025-05-26 PROCEDURE — 80053 COMPREHEN METABOLIC PANEL: CPT | Performed by: EMERGENCY MEDICINE

## 2025-05-26 PROCEDURE — 99285 EMERGENCY DEPT VISIT HI MDM: CPT

## 2025-05-26 PROCEDURE — NC001 PR NO CHARGE: Performed by: SURGERY

## 2025-05-26 PROCEDURE — 99285 EMERGENCY DEPT VISIT HI MDM: CPT | Performed by: EMERGENCY MEDICINE

## 2025-05-26 PROCEDURE — 70498 CT ANGIOGRAPHY NECK: CPT

## 2025-05-26 PROCEDURE — 96365 THER/PROPH/DIAG IV INF INIT: CPT

## 2025-05-26 PROCEDURE — 83036 HEMOGLOBIN GLYCOSYLATED A1C: CPT | Performed by: PHYSICIAN ASSISTANT

## 2025-05-26 PROCEDURE — 80307 DRUG TEST PRSMV CHEM ANLYZR: CPT | Performed by: PHYSICIAN ASSISTANT

## 2025-05-26 PROCEDURE — 93005 ELECTROCARDIOGRAM TRACING: CPT

## 2025-05-26 RX ORDER — MORPHINE SULFATE 4 MG/ML
4 INJECTION, SOLUTION INTRAMUSCULAR; INTRAVENOUS ONCE
Status: COMPLETED | OUTPATIENT
Start: 2025-05-26 | End: 2025-05-26

## 2025-05-26 RX ORDER — INSULIN GLARGINE 100 [IU]/ML
25 INJECTION, SOLUTION SUBCUTANEOUS EVERY 12 HOURS SCHEDULED
Status: DISCONTINUED | OUTPATIENT
Start: 2025-05-26 | End: 2025-05-28

## 2025-05-26 RX ORDER — OXYCODONE HYDROCHLORIDE 5 MG/1
2.5 TABLET ORAL EVERY 6 HOURS PRN
Refills: 0 | Status: DISCONTINUED | OUTPATIENT
Start: 2025-05-26 | End: 2025-06-02 | Stop reason: HOSPADM

## 2025-05-26 RX ORDER — PRAZOSIN HYDROCHLORIDE 1 MG/1
1 CAPSULE ORAL
Status: DISCONTINUED | OUTPATIENT
Start: 2025-05-26 | End: 2025-06-02 | Stop reason: HOSPADM

## 2025-05-26 RX ORDER — OXYCODONE HYDROCHLORIDE 5 MG/1
5 TABLET ORAL EVERY 6 HOURS PRN
Refills: 0 | Status: DISCONTINUED | OUTPATIENT
Start: 2025-05-26 | End: 2025-06-02 | Stop reason: HOSPADM

## 2025-05-26 RX ORDER — FLUTICASONE FUROATE AND VILANTEROL 100; 25 UG/1; UG/1
1 POWDER RESPIRATORY (INHALATION)
Status: DISCONTINUED | OUTPATIENT
Start: 2025-05-26 | End: 2025-06-02 | Stop reason: HOSPADM

## 2025-05-26 RX ORDER — ACETAMINOPHEN 325 MG/1
975 TABLET ORAL EVERY 8 HOURS SCHEDULED
Status: DISCONTINUED | OUTPATIENT
Start: 2025-05-26 | End: 2025-06-02 | Stop reason: HOSPADM

## 2025-05-26 RX ORDER — ASPIRIN 81 MG/1
81 TABLET, CHEWABLE ORAL DAILY
Status: DISCONTINUED | OUTPATIENT
Start: 2025-05-27 | End: 2025-05-26

## 2025-05-26 RX ORDER — KETOROLAC TROMETHAMINE 30 MG/ML
15 INJECTION, SOLUTION INTRAMUSCULAR; INTRAVENOUS ONCE
Status: COMPLETED | OUTPATIENT
Start: 2025-05-26 | End: 2025-05-26

## 2025-05-26 RX ORDER — MORPHINE SULFATE 4 MG/ML
INJECTION, SOLUTION INTRAMUSCULAR; INTRAVENOUS
Status: COMPLETED
Start: 2025-05-26 | End: 2025-05-26

## 2025-05-26 RX ORDER — LISINOPRIL 20 MG/1
20 TABLET ORAL DAILY
Status: DISCONTINUED | OUTPATIENT
Start: 2025-05-26 | End: 2025-06-02 | Stop reason: HOSPADM

## 2025-05-26 RX ORDER — ACETAMINOPHEN 10 MG/ML
1000 INJECTION, SOLUTION INTRAVENOUS ONCE
Status: COMPLETED | OUTPATIENT
Start: 2025-05-26 | End: 2025-05-26

## 2025-05-26 RX ORDER — DULOXETIN HYDROCHLORIDE 60 MG/1
60 CAPSULE, DELAYED RELEASE ORAL DAILY
Status: DISCONTINUED | OUTPATIENT
Start: 2025-05-26 | End: 2025-06-02 | Stop reason: HOSPADM

## 2025-05-26 RX ORDER — MORPHINE SULFATE 4 MG/ML
4 INJECTION, SOLUTION INTRAMUSCULAR; INTRAVENOUS EVERY 4 HOURS PRN
Status: DISCONTINUED | OUTPATIENT
Start: 2025-05-26 | End: 2025-05-31

## 2025-05-26 RX ORDER — NICOTINE 21 MG/24HR
1 PATCH, TRANSDERMAL 24 HOURS TRANSDERMAL DAILY
Status: DISCONTINUED | OUTPATIENT
Start: 2025-05-26 | End: 2025-06-02 | Stop reason: HOSPADM

## 2025-05-26 RX ORDER — INSULIN LISPRO 100 [IU]/ML
1-6 INJECTION, SOLUTION INTRAVENOUS; SUBCUTANEOUS
Status: DISCONTINUED | OUTPATIENT
Start: 2025-05-26 | End: 2025-06-02 | Stop reason: HOSPADM

## 2025-05-26 RX ORDER — NICOTINE 21 MG/24HR
1 PATCH, TRANSDERMAL 24 HOURS TRANSDERMAL DAILY
Status: DISCONTINUED | OUTPATIENT
Start: 2025-05-26 | End: 2025-05-26

## 2025-05-26 RX ORDER — ALBUTEROL SULFATE 90 UG/1
1 INHALANT RESPIRATORY (INHALATION) EVERY 4 HOURS PRN
Status: DISCONTINUED | OUTPATIENT
Start: 2025-05-26 | End: 2025-06-02 | Stop reason: HOSPADM

## 2025-05-26 RX ORDER — OXYCODONE HYDROCHLORIDE 10 MG/1
10 TABLET ORAL EVERY 6 HOURS PRN
Refills: 0 | Status: DISCONTINUED | OUTPATIENT
Start: 2025-05-26 | End: 2025-05-26

## 2025-05-26 RX ORDER — OXYCODONE HYDROCHLORIDE 5 MG/1
5 TABLET ORAL EVERY 6 HOURS PRN
Refills: 0 | Status: DISCONTINUED | OUTPATIENT
Start: 2025-05-26 | End: 2025-05-26

## 2025-05-26 RX ORDER — ASPIRIN 325 MG
325 TABLET ORAL ONCE
Status: COMPLETED | OUTPATIENT
Start: 2025-05-26 | End: 2025-05-26

## 2025-05-26 RX ORDER — ATORVASTATIN CALCIUM 40 MG/1
40 TABLET, FILM COATED ORAL EVERY EVENING
Status: DISCONTINUED | OUTPATIENT
Start: 2025-05-26 | End: 2025-05-26

## 2025-05-26 RX ORDER — ASPIRIN 81 MG/1
81 TABLET, CHEWABLE ORAL DAILY
Status: DISCONTINUED | OUTPATIENT
Start: 2025-05-27 | End: 2025-06-02 | Stop reason: HOSPADM

## 2025-05-26 RX ORDER — KETOROLAC TROMETHAMINE 30 MG/ML
15 INJECTION, SOLUTION INTRAMUSCULAR; INTRAVENOUS EVERY 6 HOURS SCHEDULED
Status: COMPLETED | OUTPATIENT
Start: 2025-05-26 | End: 2025-05-29

## 2025-05-26 RX ORDER — INSULIN LISPRO 100 [IU]/ML
1-5 INJECTION, SOLUTION INTRAVENOUS; SUBCUTANEOUS
Status: DISCONTINUED | OUTPATIENT
Start: 2025-05-26 | End: 2025-06-02 | Stop reason: HOSPADM

## 2025-05-26 RX ORDER — METHOCARBAMOL 750 MG/1
750 TABLET, FILM COATED ORAL EVERY 6 HOURS SCHEDULED
Status: DISCONTINUED | OUTPATIENT
Start: 2025-05-26 | End: 2025-05-28

## 2025-05-26 RX ORDER — HYDROXYZINE HYDROCHLORIDE 25 MG/1
25 TABLET, FILM COATED ORAL 2 TIMES DAILY PRN
Status: DISCONTINUED | OUTPATIENT
Start: 2025-05-26 | End: 2025-06-02 | Stop reason: HOSPADM

## 2025-05-26 RX ORDER — ATORVASTATIN CALCIUM 40 MG/1
80 TABLET, FILM COATED ORAL
Status: DISCONTINUED | OUTPATIENT
Start: 2025-05-26 | End: 2025-06-02 | Stop reason: HOSPADM

## 2025-05-26 RX ADMIN — ACETAMINOPHEN 975 MG: 325 TABLET, FILM COATED ORAL at 14:35

## 2025-05-26 RX ADMIN — METHOCARBAMOL TABLETS 750 MG: 750 TABLET, COATED ORAL at 17:05

## 2025-05-26 RX ADMIN — OXYCODONE 5 MG: 5 TABLET ORAL at 21:57

## 2025-05-26 RX ADMIN — INSULIN LISPRO 6 UNITS: 100 INJECTION, SOLUTION INTRAVENOUS; SUBCUTANEOUS at 10:31

## 2025-05-26 RX ADMIN — ACETAMINOPHEN 1000 MG: 10 INJECTION INTRAVENOUS at 05:39

## 2025-05-26 RX ADMIN — ASPIRIN 325 MG ORAL TABLET 325 MG: 325 PILL ORAL at 09:31

## 2025-05-26 RX ADMIN — IOHEXOL 100 ML: 350 INJECTION, SOLUTION INTRAVENOUS at 04:35

## 2025-05-26 RX ADMIN — INSULIN LISPRO 6 UNITS: 100 INJECTION, SOLUTION INTRAVENOUS; SUBCUTANEOUS at 17:06

## 2025-05-26 RX ADMIN — METHOCARBAMOL TABLETS 750 MG: 750 TABLET, COATED ORAL at 11:27

## 2025-05-26 RX ADMIN — MORPHINE SULFATE 4 MG: 4 INJECTION INTRAVENOUS at 11:42

## 2025-05-26 RX ADMIN — NICOTINE 1 PATCH: 21 PATCH, EXTENDED RELEASE TRANSDERMAL at 11:27

## 2025-05-26 RX ADMIN — OXYCODONE 5 MG: 5 TABLET ORAL at 14:35

## 2025-05-26 RX ADMIN — KETOROLAC TROMETHAMINE 15 MG: 30 INJECTION, SOLUTION INTRAMUSCULAR at 11:59

## 2025-05-26 RX ADMIN — MORPHINE SULFATE 4 MG: 4 INJECTION INTRAVENOUS at 04:57

## 2025-05-26 RX ADMIN — MORPHINE SULFATE 4 MG: 4 INJECTION INTRAVENOUS at 20:14

## 2025-05-26 RX ADMIN — ACETAMINOPHEN 975 MG: 325 TABLET, FILM COATED ORAL at 21:57

## 2025-05-26 RX ADMIN — MORPHINE SULFATE 4 MG: 4 INJECTION INTRAVENOUS at 07:08

## 2025-05-26 RX ADMIN — DULOXETINE HYDROCHLORIDE 60 MG: 60 CAPSULE, DELAYED RELEASE ORAL at 11:27

## 2025-05-26 RX ADMIN — ATORVASTATIN CALCIUM 80 MG: 40 TABLET, FILM COATED ORAL at 17:05

## 2025-05-26 RX ADMIN — INSULIN GLARGINE 25 UNITS: 100 INJECTION, SOLUTION SUBCUTANEOUS at 11:25

## 2025-05-26 RX ADMIN — INSULIN LISPRO 2 UNITS: 100 INJECTION, SOLUTION INTRAVENOUS; SUBCUTANEOUS at 22:13

## 2025-05-26 RX ADMIN — INSULIN GLARGINE 25 UNITS: 100 INJECTION, SOLUTION SUBCUTANEOUS at 21:56

## 2025-05-26 RX ADMIN — MORPHINE SULFATE 4 MG: 4 INJECTION, SOLUTION INTRAMUSCULAR; INTRAVENOUS at 07:08

## 2025-05-26 RX ADMIN — KETOROLAC TROMETHAMINE 15 MG: 30 INJECTION, SOLUTION INTRAMUSCULAR at 03:55

## 2025-05-26 RX ADMIN — KETOROLAC TROMETHAMINE 15 MG: 30 INJECTION, SOLUTION INTRAMUSCULAR at 17:05

## 2025-05-26 NOTE — H&P
H&P - Hospitalist   Name: Tony Roberto 39 y.o. female I MRN: [id number]  Unit/Bed#: -01 I Date of Admission: 5/26/2025   Date of Service: 5/26/2025 I Hospital Day: 0     Assessment & Plan  Stroke-like symptoms  Patient presented with distal left arm numbness/weakness/pain since 2 AM today. Patient reports having numbness on bilateral 5th fingers for the past month, however weakness & pain from left elbow down to left hand is new.  Cannot exclude possible stroke with prior CVA hx and noncompliance with medications, though with hx poorly controlled T2DM polyneuropathy is a possible etiology  Not a candidate for TNK, reports taking her Xarelto at around 2:30 AM at home PTA  CTA head/neck: No acute intracranial hemorrhage, sequela of old infarction left thalamus, left caudate and left ganglial capsular region. No large vessel arterial occlusion, significant stenosis or focal aneurysm.  Does not meet SIRS criteria, nl lactic acid & negative troponin x2. EKG with no acute ischemic changes.  Received aspirin 325 mg in ED, restart statin & Xarelto  Check lipid panel, HgbA1c, MRI brain, Echo  Neurology consult, placed on telemetry & stroke pathway  Pain management with IV Toradol, Tylenol, Robaxin, PRN oxycodone and IV morphine; patient declines gabapentin.  Type 2 diabetes mellitus with diabetic polyneuropathy, with long-term current use of insulin ([facility])  Lab Results   Component Value Date    HGBA1C 11.1 (H) 08/12/2024    HGBA1C 10.7 (H) 08/12/2024     Recent Labs     05/26/25  0247 05/26/25  1011 05/26/25  1100   POCGLU 354* 393* 394*     Blood Sugar Average: Last 72 hrs:  (P) [id number]    Poorly controlled T2DM per most recent HgbA1c 11. Patient admits to noncompliance with insulin/medications, notes difficulty with affording and running out of supplies for glucometer & recently applied for Medicaid.  Hyperglycemic with BG 300s in ED, though not in DKA.  Prior home regimen: Lantus 30U BID, Humalog  SSI 4-16U TID, Trulicity weekly injections.   Obtain updated HgbA1c while inpatient  Resume Lantus at 25U BID to start  SSI coverage with Accu-Cheks ACHS  Carb controlled diet, hypoglycemia protocol  Peripheral arterial disease (HCC)  History of PAD, known CLTI s/p left fem-BK pop bypass which subsequently occluded; s/p L AKA for nonsalvageable LLE in 2023  Last LEAD (3/21/25) revealed diffuse disease, significant decrease in right NIKKI now in severe range at 0.57  Vascular surgery consult while inpatient, patient with ongoing intermittent RLE claudication pain   Wound on right lateral ankle and right first metatarsal base, check XR right foot/ankle though doubt infection  Essential hypertension  Hypertensive urgency on admission, BP up to 200/110s  Reports noncompliance with home antihypertensives  Hold prior home lisinopril 20 mg daily, prazosin 1 mg qHS  Permissive HTN pending MRI brain & neurology consult  History of substance use  History of cocaine use, patient admits to smoking crack cocaine 4 days PTA & marijuana 2 days PTA  Denies alcohol or IVDU, no signs of withdrawal on admission  Check UDS, encouraged cessation of substance use  Hyponatremia  Pseudohyponatremia in setting of hyperglycemia, corrected Na 136  Goal normoglycemia, check BMP in a.m. & monitor electrolytes  Mixed hyperlipidemia  Check updated lipid panel, resume atorvastatin  Bipolar disorder  Mood currently stable  Resume Cymbalta, PRN Atarax  Class 3 severe obesity due to excess calories without serious comorbidity with body mass index (BMI) of 45.0 to 49.9 in adult  BMI 41.57, affects all aspects of care  Strongly encourage dietary/lifestyle modifications  COPD (chronic obstructive pulmonary disease)/asthma  Not in acute exacerbation, currently stable on room air  Resume daily maintenance inhaler, PRN albuterol  S/P AKA (above knee amputation) unilateral, left (HCC)  S/p left AKA on 3/29/2023, wheelchair-bound at baseline  Tobacco  abuse  Nicotine patches, smoking cessation education    VTE Pharmacologic Prophylaxis: VTE Score: 3 Moderate Risk (Score 3-4) - Pharmacological DVT Prophylaxis Ordered: rivaroxaban (Xarelto).  Code Status: Level 1 - Full Code   Discussion with family: Updated  (wife) at bedside.    Anticipated Length of Stay: Patient will be admitted on an observation basis with an anticipated length of stay of less than 2 midnights secondary to stroke-like symptoms.    History of Present Illness   Chief Complaint: Left arm numbness/weakness    Tony Roberto is a 39 y.o. female with a PMH of CVA, obesity, poorly controlled T2DM, PAD, s/p left AKA, substance use, bipolar disorder, HLD, HTN, COPD, tobacco use, who presents with stroke-like symptoms.  Patient notes having paresthesias with numbness on bilateral fifth fingers as well as blurry vision for the past month, however upon waking at 2 AM today noted to have severe pain, weakness and numbness/tingling from left elbow down to left hand/wrist.  Patient admits to recent crack cocaine use 4 days PTA, also admits to medication noncompliance due to financial difficulties, though does note taking aspirin and Xarelto at 2:30 AM prior to ambulance arrival. Denies any recent surgeries, travel, infections/antibiotic use, injury/trauma.    Review of Systems   Constitutional:  Negative for appetite change, chills, diaphoresis, fatigue and fever.   HENT:  Negative for drooling, hearing loss, trouble swallowing and voice change.    Eyes:  Positive for visual disturbance. Negative for photophobia.   Respiratory:  Negative for cough, chest tightness and shortness of breath.    Cardiovascular:  Negative for chest pain, palpitations and leg swelling.   Gastrointestinal:  Negative for abdominal pain, constipation, diarrhea, nausea and vomiting.   Genitourinary:  Negative for decreased urine volume, difficulty urinating, dysuria, hematuria and urgency.   Musculoskeletal:   "Positive for gait problem. Negative for arthralgias, back pain, joint swelling and myalgias.   Skin:  Positive for wound. Negative for color change and rash.   Neurological:  Positive for weakness and numbness. Negative for dizziness, tremors, syncope, facial asymmetry, speech difficulty, light-headedness and headaches.   Psychiatric/Behavioral:  The patient is nervous/anxious.        Historical Information   Past Medical History[1]  Past Surgical History[2]  Social History[3]  E-Cigarette/Vaping    E-Cigarette Use Never User      E-Cigarette/Vaping Substances    Nicotine No     THC No     CBD No     Flavoring No     Other No     Unknown No      Family History[4]  Social History:  Marital Status: /Civil Union   Occupation: Unemployed/on disability  Patient Pre-hospital Living Situation: Home, With spouse  Patient Pre-hospital Level of Mobility: manual wheelchair  Patient Pre-hospital Diet Restrictions: None    Meds/Allergies   I have reviewed home medications with patient personally.  Prior to Admission medications    Medication Sig Start Date End Date Taking? Authorizing Provider   Advair -21 MCG/ACT inhaler Inhale 2 puffs 2 (two) times a day Rinse mouth after use 8/9/23  Yes Mati Prabhakar MD   albuterol (PROVENTIL HFA,VENTOLIN HFA) 90 mcg/act inhaler INHALE 2 PUFFS EVERY 4 HOURS AS NEEDED FOR WHEEZING OR SHORTNESS OF BREATH 4/8/25  Yes Mati Prabhakar MD   Alcohol Swabs (B-D SINGLE USE SWABS REGULAR) PADS USE 3-4 TIMES DAILY WITH PEN 4/8/25  Yes Mati Prabhakar MD   atorvastatin (LIPITOR) 40 mg tablet TAKE 1 TABLET (40 MG TOTAL) BY MOUTH DAILY WITH DINNER 2/14/25  Yes Mati Prabhakar MD   B-D INS SYR MICROFINE .5CC/28G 28G X 1/2\" 0.5 ML MISC USE AS DIRECTED 4/9/25  Yes Stevan Chinchilla MD   BD Pen Needle Leann 2nd Gen 32G X 4 MM MISC INJECT UNDER THE SKIN 4 (FOUR) TIMES A DAY (BEFORE MEALS AND AT BEDTIME) 4/9/25  Yes Toby Yousif MD   Blood Glucose Monitoring Suppl (ONE TOUCH ULTRA 2) " w/Device KIT BY DEVICE ROUTE 2 (TWO) TIMES A DAY CHECK BLOOD SUGARS AND RECORD VALUE AND TIME OF DAY TWICE A DAY 10/12/23  Yes Mati Prabhakar MD   Blood Pressure Monitoring (Blood Pressure Monitor Automat) KATLYN Use Daily as Directed 11/14/23  Yes Mati Prabhakar MD   Comfort EZ Pen Needles 33G X 4 MM MISC USE AS DIRECTED 5/10/23  Yes Mati Prabhakar MD   DULoxetine (CYMBALTA) 30 mg delayed release capsule TAKE 2 CAPSULES (60 MG TOTAL) BY MOUTH DAILY 2/21/25  Yes Mati Prabhakar MD   insulin lispro (HumALOG/ADMELOG) 100 units/mL injection INJECT 4-16 UNITS UNDER THE SKIN 3 (THREE) TIMES A DAY BEFORE MEALS 4/9/25  Yes Stevan Chinchilla MD   lisinopril (ZESTRIL) 20 mg tablet TAKE 1 TABLET (20 MG TOTAL) BY MOUTH DAILY 2/14/25  Yes Mati Prabhakar MD   Xarelto 20 MG tablet TAKE 1 TABLET (20 MG TOTAL) BY MOUTH DAILY WITH BREAKFAST 1/31/25  Yes Mati Prabhakar MD   acetaminophen (TYLENOL) 500 mg tablet Take 2 tablets (1,000 mg total) by mouth every 8 (eight) hours as needed for mild pain  Patient not taking: Reported on 5/26/2025 12/20/24   Jose Sanabria MD   aspirin 81 mg chewable tablet CHEW 1 TABLET (81 MG TOTAL) DAILY 9/12/24   Mati Prabhakar MD   Global Inject Ease Lancets 30G MISC USE 3 (THREE) TIMES A DAY 11/1/23   Mati Prabhakar MD   glycerin-hypromellose- (ARTIFICIAL TEARS) 0.2-0.2-1 % SOLN Administer 2 drops to both eyes every 6 (six) hours as needed (for eye discomfort) 8/28/23   Willow Saha,    hydrOXYzine HCL (ATARAX) 50 mg tablet TAKE 1 TABLET (50 MG TOTAL) BY MOUTH DAILY AT BEDTIME AS NEEDED FOR ITCHING OR ANXIETY  Patient not taking: Reported on 5/26/2025 10/18/23   Mati Prabhakar MD   ibuprofen (MOTRIN) 600 mg tablet Take 1 tablet (600 mg total) by mouth every 8 (eight) hours as needed for mild pain 12/20/24   Jose Sanabria MD   ketorolac (TORADOL) 10 mg tablet Take 1 tablet (10 mg total) by mouth every 6 (six) hours as needed for moderate pain  Patient not taking: Reported on  "5/26/2025 1/28/25   Manuela Bradford DO   ketotifen (ZADITOR) 0.025 % ophthalmic solution Administer 1 drop to both eyes 2 (two) times a day as needed (for eye discomfort)  Patient not taking: Reported on 5/26/2025 8/28/23   Willow Saha DO   Lantus 100 UNIT/ML subcutaneous injection INJECT 30 UNITS UNDER THE SKIN EVERY 12 (TWELVE) HOURS 3/7/25   Mati Prabhakar MD   lidocaine (Lidoderm) 5 % Apply 1 patch topically over 12 hours daily Remove & Discard patch within 12 hours or as directed by MD 1/28/25   Manuela Bradford DO   magnesium chloride-calcium (Slow-Mag) 71.5-119 mg Take 2 tablets by mouth daily for 7 days 12/10/24 12/17/24  Elmer GOODWIN DO   metFORMIN (GLUCOPHAGE) 1000 MG tablet TAKE ONE (1) TABLET BY MOUTH TWICE DAILY WITH FOOD 5/13/25   Stevan Chinchilla MD   methocarbamol (ROBAXIN) 500 mg tablet Take 1 tablet (500 mg total) by mouth 2 (two) times a day  Patient not taking: Reported on 5/26/2025 1/28/25   Manuela Bradford DO   naproxen (Naprosyn) 500 mg tablet Take 1 tablet (500 mg total) by mouth 2 (two) times a day with meals for 7 days 12/10/24 12/17/24  Elmer GOODWIN DO   nicotine (NICODERM CQ) 21 mg/24 hr TD 24 hr patch Place 1 patch on the skin over 24 hours daily  Patient not taking: Reported on 9/11/2024 9/18/23   Mati Prabhakar MD   OneTouch Ultra test strip Use 1 each daily as needed (before meals) Use as instructed  Patient not taking: Reported on 5/26/2025 9/18/23   Mati Prabhakar MD   prazosin (MINIPRESS) 1 mg capsule TAKE ONE (1) CAPSULE ONCE A DAY AT BEDTIME FOR HYPERTENSION  Patient not taking: Reported on 5/26/2025 3/7/25   Mati Prabhakar MD   Sure Comfort Insulin Syringe 31G X 5/16\" 0.3 ML MISC 2 (two) times a day Use as directed  Patient not taking: Reported on 5/26/2025 11/9/23   Historical Provider, MD   topiramate (TOPAMAX) 25 mg tablet TAKE 1 TABLET (25 MG TOTAL) BY MOUTH 2 (TWO) TIMES A DAY  Patient not taking: Reported on 5/26/2025 10/6/23   Mati " MD Aki   Trulicity 1.5 MG/0.5ML SOAJ AS DIRECTED WEEKLY  Patient not taking: Reported on 5/26/2025 5/15/25   Mati Prabhakar MD     No Known Allergies    Objective :  Temp:  [97.6 °F (36.4 °C)-98.3 °F (36.8 °C)] 98.1 °F (36.7 °C)  HR:  [1-89] 80  BP: (130-206)/() 169/111  Resp:  [16-20] 16  SpO2:  [96 %-100 %] 96 %  O2 Device: None (Room air)    Physical Exam  Constitutional:       General: She is not in acute distress.     Appearance: She is obese. She is not ill-appearing, toxic-appearing or diaphoretic.   HENT:      Head: Normocephalic and atraumatic.      Mouth/Throat:      Mouth: Mucous membranes are moist.      Pharynx: Oropharynx is clear.     Eyes:      Extraocular Movements: Extraocular movements intact.      Pupils: Pupils are equal, round, and reactive to light.       Cardiovascular:      Rate and Rhythm: Normal rate and regular rhythm.      Pulses: Normal pulses.      Heart sounds: Normal heart sounds.   Pulmonary:      Effort: Pulmonary effort is normal. No respiratory distress.   Abdominal:      General: There is no distension.      Palpations: Abdomen is soft.      Tenderness: There is no abdominal tenderness.     Musculoskeletal:         General: Tenderness present. No swelling.      Comments: S/p left AKA. Generalized tenderness to palpation of left forearm/wrist/hand, no point tenderness.     Skin:     General: Skin is warm.      Findings: Lesion present. No erythema or rash.      Comments: Small open wound on dorsum of right foot at base of first metatarsal, nontender with no surrounding erythema/edema or purulent drainage (see image below). Dry, scabbed/closed wound on lateral aspect of right ankle, nontender with no surrounding erythema/edema or purulent drainage (see image below).      Neurological:      General: No focal deficit present.      Mental Status: She is alert and oriented to person, place, and time.      Cranial Nerves: No cranial nerve deficit.      Sensory: Sensory  deficit present.      Comments: Decreased sensation on left posterior forearm, unable to make fist on left hand.   Psychiatric:         Mood and Affect: Mood normal.         Behavior: Behavior normal.                  Lines/Drains:            Lab Results: I have reviewed the following results:  Results from last 7 days   Lab Units 05/26/25  0321   WBC Thousand/uL 10.89*   HEMOGLOBIN g/dL 13.4   HEMATOCRIT % 37.6   PLATELETS Thousands/uL 393*   SEGS PCT % 61   LYMPHO PCT % 30   MONO PCT % 4   EOS PCT % 3     Results from last 7 days   Lab Units 05/26/25  0321   SODIUM mmol/L 132*   POTASSIUM mmol/L 3.7   CHLORIDE mmol/L 100   CO2 mmol/L 24   BUN mg/dL 14   CREATININE mg/dL 0.53*   ANION GAP mmol/L 8   CALCIUM mg/dL 8.4   ALBUMIN g/dL 3.6   TOTAL BILIRUBIN mg/dL 0.23   ALK PHOS U/L 126*   ALT U/L 16   AST U/L 11*   GLUCOSE RANDOM mg/dL 341*         Results from last 7 days   Lab Units 05/26/25  1100 05/26/25  1011 05/26/25  0247   POC GLUCOSE mg/dl 394* 393* 354*     Lab Results   Component Value Date    HGBA1C 11.1 (H) 08/12/2024    HGBA1C 10.7 (H) 08/12/2024    HGBA1C 9.9 (H) 08/25/2023     Results from last 7 days   Lab Units 05/26/25  0321   LACTIC ACID mmol/L 1.5       Imaging Results Review: I reviewed radiology reports from this admission including: CT head.  Other Study Results Review: EKG was reviewed.     Administrative Statements   I have spent a total time of 55 minutes in caring for this patient on the day of the visit/encounter including Patient and family education, Documenting in the medical record, Reviewing/placing orders in the medical record (including tests, medications, and/or procedures), Obtaining or reviewing history  , and Communicating with other healthcare professionals .    ** Please Note: This note has been constructed using a voice recognition system. **         [1]   Past Medical History:  Diagnosis Date    Asthma     Atherosclerosis     Bipolar 1 disorder (HCC)     COPD (chronic  "obstructive pulmonary disease) (HCC)     Depression     Diabetes mellitus (HCC)     Hypertension     Psychiatric disorder     cutting history    PTSD (post-traumatic stress disorder)     Schizoaffective disorder (HCC)     Tendonitis    [2]   Past Surgical History:  Procedure Laterality Date    AMPUTATION ABOVE KNEE (AKA) Left 3/29/2023    Procedure: AMPUTATION ABOVE KNEE (AKA);  Surgeon: Savi Langford MD;  Location: [facility];  Service: Vascular    BYPASS FEMORAL-POPLITEAL Left 2021    Procedure: Left Common Femoral Below Knee to Popliteal Bypass with Insitu GSV graft.  Left Lower Extremity Angiogram;  Surgeon: Savi Langford MD;  Location: [facility];  Service: Vascular     SECTION      EAR SURGERY      FL LUMBAR PUNCTURE DIAGNOSTIC  10/25/2018    IR LOWER EXTREMITY ANGIOGRAM  2021    IR LOWER EXTREMITY ANGIOGRAM  2021    IR LOWER EXTREMITY ANGIOGRAM  3/24/2023    AZ SLCTV CATHJ 3RD+ ORD SLCTV ABDL PEL/LXTR BRNCH Left 3/24/2023    Procedure: Left leg angiogram;  Surgeon: Byron Wahl DO;  Location: [facility];  Service: Vascular    TUBAL LIGATION     [3]   Social History  Tobacco Use    Smoking status: Every Day     Current packs/day: 1.00     Average packs/day: 1 pack/day for 22.0 years (22.0 ttl pk-yrs)     Types: Cigarettes     Start date: 3/24/2003     Last attempt to quit: 3/24/2023     Passive exposure: Current    Smokeless tobacco: Former     Quit date: 2022   Vaping Use    Vaping status: Never Used   Substance and Sexual Activity    Alcohol use: Not Currently     Comment: not currently    Drug use: Yes     Types: \"Crack\" cocaine, Marijuana     Comment: 1 years clean from crack cocaine since     Sexual activity: Yes     Partners: Female, Male     Birth control/protection: None, Condom   [4]   Family History  Problem Relation Name Age of Onset    Hypertension Mother      Diabetes Mother      HIV Mother      Heart disease Mother      No Known Problems Father       "

## 2025-05-26 NOTE — ASSESSMENT & PLAN NOTE
Not in acute exacerbation, currently stable on room air  Resume daily maintenance inhaler, PRN albuterol

## 2025-05-26 NOTE — ASSESSMENT & PLAN NOTE
History of PAD, known CLTI s/p left fem-BK pop bypass which subsequently occluded; s/p L AKA for nonsalvageable LLE in 2023  Last LEAD (3/21/25) revealed diffuse disease, significant decrease in right NIKKI now in severe range at 0.57  Vascular surgery consult while inpatient, patient with ongoing intermittent RLE claudication pain   Wound on right lateral ankle and right first metatarsal base, check XR right foot/ankle though doubt infection

## 2025-05-26 NOTE — ASSESSMENT & PLAN NOTE
History of cocaine use, patient admits to smoking crack cocaine 4 days PTA & marijuana 2 days PTA  Denies alcohol or IVDU, no signs of withdrawal on admission  Check UDS, encouraged cessation of substance use

## 2025-05-26 NOTE — ASSESSMENT & PLAN NOTE
Hypertensive urgency on admission, BP up to 200/110s  Reports noncompliance with home antihypertensives  Hold prior home lisinopril 20 mg daily, prazosin 1 mg qHS  Permissive HTN pending MRI brain & neurology consult

## 2025-05-26 NOTE — ED PROVIDER NOTES
Time reflects when diagnosis was documented in both MDM as applicable and the Disposition within this note       Time User Action Codes Description Comment    5/26/2025  5:11 AM Rosalina Fallon [M79.642] Left hand pain     5/26/2025  8:14 AM Rosalina Fallon [R29.90] Stroke-like symptoms     5/26/2025 11:02 AM Lane Shanthi Add [R20.0] Left arm numbness     5/26/2025 11:03 AM Lane, Shanthi Add [Z86.73] History of CVA (cerebrovascular accident)     5/26/2025 11:23 AM Lane, Shanthi Add [I73.9] Peripheral arterial disease (HCC)     5/26/2025 11:23 AM Lane, Shanthi Add [Z89.612] S/P AKA (above knee amputation) unilateral, left (HCC)     5/28/2025  7:14 AM Lane, Shanthi Add [S91.301A] Wound of right foot     5/28/2025 11:36 AM Lane Shanthi Add [G56.92] Compression neuropathy of left upper extremity     6/2/2025  7:38 AM Lane, Shanthi Add [E11.42,  Z79.4] Type 2 diabetes mellitus with diabetic polyneuropathy, with long-term current use of insulin (HCC)     6/2/2025  2:17 PM Lane, Shanthi Add [E11.9] Diabetes (HCC)     6/2/2025  2:18 PM Lane, Shanthi Add [I73.9] PAD (peripheral artery disease)     6/2/2025  2:19 PM Lane, Shanthi Add [I10] Hypertension     6/2/2025  2:20 PM Lane, Shanthi Add [J44.1] Chronic obstructive pulmonary disease with acute exacerbation (HCC)     6/2/2025  2:20 PM Lane, Shanthi Add [J44.9] Chronic obstructive pulmonary disease, unspecified COPD type (HCC)     6/2/2025  2:20 PM Lane, Shanthi Add [F31.75] Bipolar disorder, in partial remission, most recent episode depressed (HCC)           ED Disposition       ED Disposition   Admit    Condition   Stable    Date/Time   Mon May 26, 2025  8:14 AM    Comment   Case was discussed with Dr. Madrigal and the patient's admission status was agreed to be Admission Status: observation status to the service of Dr. Madrigal .               Assessment & Plan       Medical Decision Making  39-year-old female hx of uncontrolled diabetes, PAD s/p L NIALL presents  with acute onset L arm/hand numbness below the elbow with associated weakness. Also having pain which is severe. Has had prior transient numbness sxs in a more limited distribution. She has normal vitals on arrival. Moderate numbness and weakness isolated to L distal arm. Given multi-modal pain management. Not made a stroke alert due to prolonged duration of sxs. Initial glucose 354. Screening labs otherwise notable for normal CBC and CMP. CTA head and neck showing multiple old CVAs which pt was unaware of. I consulted neurology and they recommended admission for stroke pathway and MRI. PT in agreement with plan.    Amount and/or Complexity of Data Reviewed  Labs: ordered.  Radiology: ordered.    Risk  OTC drugs.  Prescription drug management.  Decision regarding hospitalization.             Medications   ketorolac (TORADOL) injection 15 mg (15 mg Intravenous Given 5/26/25 0355)   iohexol (OMNIPAQUE) 350 MG/ML injection (SINGLE-DOSE) 100 mL (100 mL Intravenous Given 5/26/25 0435)   morphine injection 4 mg (4 mg Intravenous Given 5/26/25 0457)   acetaminophen (Ofirmev) injection 1,000 mg (0 mg Intravenous Stopped 5/26/25 0709)   morphine injection 4 mg (4 mg Intravenous Given 5/26/25 0708)   aspirin tablet 325 mg (has no administration in time range)       ED Risk Strat Scores                    No data recorded        SBIRT 20yo+      Flowsheet Row Most Recent Value   Initial Alcohol Screen: US AUDIT-C     1. How often do you have a drink containing alcohol? 0 Filed at: 05/26/2025 0254   2. How many drinks containing alcohol do you have on a typical day you are drinking?  0 Filed at: 05/26/2025 0254   3a. Male UNDER 65: How often do you have five or more drinks on one occasion? 0 Filed at: 05/26/2025 0254   3b. FEMALE Any Age, or MALE 65+: How often do you have 4 or more drinks on one occassion? 0 Filed at: 05/26/2025 0254   Audit-C Score 0 Filed at: 05/26/2025 0254                            History of Present  Illness       Chief Complaint   Patient presents with    Hand Pain     Patient presents to ED, c/o worsening numbness/pain to left hand which radiates up to her elbow. Reports numbness to left hand for 4 weeks, worsening over the last hour.        Past Medical History[1]   Past Surgical History[2]   Family History[3]   Social History[4]   E-Cigarette/Vaping    E-Cigarette Use Never User       E-Cigarette/Vaping Substances    Nicotine No     THC No     CBD No     Flavoring No     Other No     Unknown No       I have reviewed and agree with the history as documented.     39-year-old female hx of uncontrolled diabetes, PAD s/p L AKA presents with acute onset L arm/hand numbness below the elbow with associated weakness. Also having pain which is severe. Has had prior transient numbness sxs in a more limited distribution.         Review of Systems   All other systems reviewed and are negative.          Objective       ED Triage Vitals   Temperature Pulse Blood Pressure Respirations SpO2 Patient Position - Orthostatic VS   05/26/25 0246 05/26/25 0245 05/26/25 0245 05/26/25 0245 05/26/25 0245 05/26/25 0245   97.8 °F (36.6 °C) 86 165/98 18 99 % Lying      Temp Source Heart Rate Source BP Location FiO2 (%) Pain Score    05/26/25 0246 05/26/25 0245 05/26/25 0245 05/30/25 0830 05/26/25 0245    Oral Monitor Left arm 22 10 - Worst Possible Pain      Vitals      Date and Time Temp Pulse SpO2 Resp BP Pain Score FACES Pain Rating User   06/02/25 1604 -- -- -- -- -- 5 -- TW   06/02/25 1507 98.6 °F (37 °C) 84 97 % 16 143/81 -- -- CK   06/02/25 0908 -- -- -- -- -- 6 -- TW   06/02/25 0702 98 °F (36.7 °C) 83 99 % 18 155/97 -- -- MQ   06/02/25 0622 -- -- -- -- -- 4 -- HB   06/01/25 2111 -- -- -- -- 141/82 9 -- HB   06/01/25 2101 98.6 °F (37 °C) 85 -- 18 141/82 -- -- HB   06/01/25 1444 97.9 °F (36.6 °C) 88 100 % 18 151/82 -- -- KW   06/01/25 0939 -- -- -- -- 143/86 No Pain -- ML   06/01/25 0830 98.6 °F (37 °C) 86 100 % 16 145/92 -- -- [facility]    06/01/25 0539 -- -- -- -- -- 8 -- CB   06/01/25 0239 -- -- -- -- -- 10 - Worst Possible Pain -- CB   05/31/25 2232 98.1 °F (36.7 °C) 83 -- 18 146/91 -- -- CB   05/31/25 2034 -- -- -- -- -- 9 -- CB   05/31/25 1800 98.1 °F (36.7 °C) 83 96 % 18 146/91 -- -- AC   05/31/25 1547 97.8 °F (36.6 °C) 81 96 % 18 134/73 -- -- AC   05/31/25 1328 -- -- -- -- -- 7 -- HB   05/31/25 0823 -- -- -- -- -- 10 - Worst Possible Pain -- HB   05/31/25 0812 -- -- -- -- 143/82 -- -- HB   05/31/25 0725 98.3 °F (36.8 °C) 86 96 % 16 143/82 -- -- NM   05/31/25 0556 -- -- -- -- -- 8 -- CB   05/31/25 0500 98 °F (36.7 °C) 84 98 % 18 154/82 -- -- CB   05/30/25 2342 98 °F (36.7 °C) 93 98 % 18 153/83 9 -- CB   05/30/25 2147 98 °F (36.7 °C) 86 -- 18 160/82 8 -- CB   05/30/25 2010 97.8 °F (36.6 °C) 84 -- 18 160/84 -- -- CB   05/30/25 1929 97.8 °F (36.6 °C) 78 -- 18 162/84 9 -- CB   05/30/25 1700 -- -- -- -- -- 10 - Worst Possible Pain -- KK   05/30/25 1645 97.6 °F (36.4 °C) 77 98 % 18 165/85 -- -- RD   05/30/25 1315 -- 86 -- -- 160/84 -- -- ME   05/30/25 1215 -- 78 -- -- 162/92 -- -- ME   05/30/25 1145 -- 83 -- -- 141/78 -- -- ME   05/30/25 1115 -- 75 -- -- 137/73 -- -- ME   05/30/25 1100 -- 73 -- -- 154/71 -- -- ME   05/30/25 1045 -- 70 -- -- 182/104 -- -- ME   05/30/25 1030 -- 69 96 % 17 181/99 -- -- ME   05/30/25 0955 -- 79 95 % 17 134/77 -- -- AV   05/30/25 0950 -- 80 95 % 17 132/77 -- -- AV   05/30/25 0945 -- 79 97 % 16 135/77 -- -- AV   05/30/25 0940 -- 82 94 % 16 130/75 -- -- AV   05/30/25 0935 -- 84 94 % 15 134/77 -- -- AV   05/30/25 0930 -- 85 94 % 15 130/76 -- -- AV   05/30/25 0925 -- 84 96 % 16 127/71 -- -- AV   05/30/25 0920 -- 85 98 % 16 121/70 -- -- AV   05/30/25 0915 -- 80 97 % 16 124/66 -- -- AV   05/30/25 0910 -- 79 97 % 15 118/64 -- -- AV   05/30/25 0905 -- 79 98 % 15 125/68 -- -- AV   05/30/25 0900 -- 79 97 % 16 125/69 -- -- AV   05/30/25 0855 -- 79 96 % 15 124/70 -- -- AV   05/30/25 0850 -- 79 96 % 16 116/69 -- -- AV   05/30/25 0845  -- 78 95 % 16 113/68 -- -- AV   05/30/25 0840 -- 79 96 % 15 118/73 -- -- AV   05/30/25 0835 -- 72 96 % 16 125/77 -- -- AV   05/30/25 0830 -- 74 97 % 16 126/78 -- -- AV   05/30/25 0825 -- 76 -- 16 146/94 -- -- AV   05/30/25 0819 -- 70 98 % 18 148/85 -- -- AV   05/30/25 0528 97.8 °F (36.6 °C) 85 -- 18 137/72 8 -- EK   05/30/25 0307 -- -- -- -- -- 10 - Worst Possible Pain -- AS   05/30/25 0003 98.2 °F (36.8 °C) 88 97 % 16 118/67 -- -- MQ   05/29/25 2152 -- -- -- -- -- 8 -- EK   05/29/25 2030 98.1 °F (36.7 °C) 88 -- 16 125/76 -- -- AS   05/29/25 2010 -- -- -- -- -- 10 - Worst Possible Pain -- EK   05/29/25 1658 -- -- -- -- -- 9 -- DG   05/29/25 1630 97.5 °F (36.4 °C) 91 98 % 16 136/72 -- -- SY   05/29/25 1426 -- -- -- -- -- 8 -- LS   05/29/25 1226 -- -- -- -- -- 9 -- DG   05/29/25 1001 -- -- -- -- -- 9 -- DG   05/29/25 0739 99.1 °F (37.3 °C) 83 98 % 20 144/86 -- -- JI   05/29/25 0558 -- -- -- -- -- 8 -- EK   05/29/25 0311 97.7 °F (36.5 °C) 92 97 % 18 106/68 -- -- ML   05/29/25 0308 -- -- -- -- -- 10 - Worst Possible Pain -- EK   05/29/25 0004 -- -- -- -- -- 8 --    05/28/25 2218 -- -- -- -- -- 10 - Worst Possible Pain --    05/28/25 2208 97.9 °F (36.6 °C) 97 97 % 18 90/51 -- --    05/28/25 2111 98.1 °F (36.7 °C) 78 99 % 18 135/75 10 - Worst Possible Pain --    05/28/25 1930 -- -- -- -- -- 10 - Worst Possible Pain --    05/28/25 1921 97.8 °F (36.6 °C) 76 97 % 16 124/69 -- --    05/28/25 1751 -- -- -- -- -- 10 - Worst Possible Pain --    05/28/25 1617 -- -- -- -- -- 10 - Worst Possible Pain --    05/28/25 1556 97.8 °F (36.6 °C) 79 98 % 18 136/77 -- --    05/28/25 1245 -- -- -- -- -- 10 - Worst Possible Pain --    05/28/25 1202 -- -- -- -- -- 8 --    05/28/25 1018 -- -- 98 % -- -- -- -- [facility]   05/28/25 1018 -- 76 -- 16 126/68 -- --    05/28/25 0904 -- -- -- -- -- 10 - Worst Possible Pain --    05/28/25 0657 98.5 °F (36.9 °C) 86 100 % 16 164/94 -- --    05/28/25 0502 98.3 °F (36.8 °C) 80 -- 18  127/83 10 - Worst Possible Pain -- AL   05/28/25 0315 -- -- -- -- -- 10 - Worst Possible Pain -- AL   05/28/25 0023 -- -- -- -- -- 8 -- AL   05/27/25 2243 97.8 °F (36.6 °C) 93 95 % 18 126/73 -- -- DEANNA   05/27/25 2143 -- -- -- -- -- 10 - Worst Possible Pain -- AL   05/27/25 2102 -- -- -- -- -- 10 - Worst Possible Pain -- AL   05/27/25 2034 -- -- -- -- -- 10 - Worst Possible Pain -- AL   05/27/25 1925 98.4 °F (36.9 °C) 62 100 % 16 160/91 -- -- SY   05/27/25 1827 -- -- -- -- -- 8 -- HB   05/27/25 1630 -- -- -- -- -- 10 - Worst Possible Pain -- HB   05/27/25 1600 -- 83 -- -- 194/96 -- -- IT   05/27/25 1525 97.8 °F (36.6 °C) 83 98 % 17 194/96 RN aware -- -- SY   05/27/25 1323 -- -- -- -- -- 8 -- HB   05/27/25 1322 -- -- -- -- -- 9 --    05/27/25 1235 97.8 °F (36.6 °C) 71 99 % 19 173/112 The nurse was notified -- -- JJ   05/27/25 1126 -- -- -- -- -- 8 -- HB   05/27/25 0747 -- -- -- -- 161/81 -- -- GJS   05/27/25 0745 98.1 °F (36.7 °C) 81 100 % 18 192/108 -- -- GJS   05/27/25 0606 -- -- -- -- -- 8 -- CB   05/27/25 0513 -- -- -- -- -- 10 - Worst Possible Pain -- HS   05/27/25 0400 98 °F (36.7 °C) 77 97 % 17 188/102 -- -- CB   05/27/25 0302 -- -- -- -- -- 8 -- CG   05/27/25 0030 98 °F (36.7 °C) 87 -- 16 151/99 8 -- CB   05/26/25 2340 -- -- 97 % -- -- -- -- MQ   05/26/25 2157 -- -- -- -- -- 6 -- CB   05/26/25 2030 97.8 °F (36.6 °C) 86 -- 16 171/102 -- -- CB   05/26/25 2014 -- -- -- -- -- 9 -- CB   05/26/25 1830 97.5 °F (36.4 °C) 84 -- 16 177/103 -- -- HB   05/26/25 1705 -- -- -- -- -- 10 - Worst Possible Pain -- ML   05/26/25 1611 98.1 °F (36.7 °C) 80 96 % 16 169/111 -- -- JA   05/26/25 1437 97.6 °F (36.4 °C) -- -- -- -- -- -- GJS   05/26/25 1435 -- 82 100 % 16 142/90 -- -- GJS   05/26/25 1435 -- -- -- -- -- 10 - Worst Possible Pain -- RB   05/26/25 1400 97.6 °F (36.4 °C) 82 100 % 16 142/90 -- -- GJS   05/26/25 1330 97.9 °F (36.6 °C) 85 100 % 20 155/109 -- -- TH   05/26/25 1230 97.8 °F (36.6 °C) 1 100 % 16 158/88 -- -- ML    05/26/25 1142 -- -- -- -- -- 10 - Worst Possible Pain -- ML   05/26/25 1130 98.3 °F (36.8 °C) 89 100 % 20 191/111 -- -- ML   05/26/25 1102 -- -- 100 % -- -- -- -- ML   05/26/25 1030 98 °F (36.7 °C) 81 -- 18 164/109 -- -- ML   05/26/25 1000 -- -- -- -- -- 10 - Worst Possible Pain -- ML   05/26/25 0940 -- -- -- -- -- 10 - Worst Possible Pain -- HB   05/26/25 0930 -- -- -- -- 206/115 -- -- GJS   05/26/25 0929 98.1 °F (36.7 °C) 80 97 % 20 202/117 -- -- GJS   05/26/25 0715 -- 89 99 % -- 172/101 -- -- SD   05/26/25 0715 -- -- -- 18 -- -- -- DR   05/26/25 0708 -- -- -- -- -- 10 - Worst Possible Pain -- SD   05/26/25 0600 -- 80 98 % 18 181/88 -- -- REINIER   05/26/25 0530 -- 80 96 % 18 183/102 -- -- REINIER   05/26/25 0457 -- -- -- -- -- 10 - Worst Possible Pain -- REINIER   05/26/25 0430 -- 76 98 % 19 139/88 -- -- REINIER   05/26/25 0400 -- 84 99 % 19 137/78 -- -- REINIER   05/26/25 0300 -- 81 99 % 18 130/94 10 - Worst Possible Pain -- REINIER   05/26/25 0246 97.8 °F (36.6 °C) -- -- -- -- -- -- REINIER   05/26/25 0245 -- 86 99 % 18 165/98 10 - Worst Possible Pain -- REINIER            Physical Exam  Constitutional:       Comments: Uncomfortable appearing   HENT:      Head: Normocephalic and atraumatic.      Nose: Nose normal.      Mouth/Throat:      Mouth: Mucous membranes are moist.     Eyes:      Extraocular Movements: Extraocular movements intact.      Conjunctiva/sclera: Conjunctivae normal.      Pupils: Pupils are equal, round, and reactive to light.       Cardiovascular:      Rate and Rhythm: Normal rate and regular rhythm.      Heart sounds: Normal heart sounds.   Pulmonary:      Effort: Pulmonary effort is normal.      Breath sounds: Normal breath sounds.     Musculoskeletal:         General: No swelling. Normal range of motion.      Cervical back: Neck supple.      Comments: L AKA, normal appearance of L hand without swelling or erythema, normal radial pulses     Skin:     General: Skin is warm and dry.      Findings: No bruising or rash.      Neurological:      Mental Status: She is alert and oriented to person, place, and time.      Cranial Nerves: No cranial nerve deficit.      Comments: L hand and forearm subjectively decreased sensation,  strength 3/5       Results Reviewed       Procedure Component Value Units Date/Time    Hemoglobin A1C w/ EAG Estimation [[id number]]  (Abnormal) Collected: 05/26/25 0321    Lab Status: Final result Specimen: Blood from Arm, Right Updated: 05/27/25 0657     Hemoglobin A1C 13.3 %       mg/dl     TSH, 3rd generation [[id number]]  (Normal) Collected: 05/26/25 0526    Lab Status: Final result Specimen: Blood from Arm, Right Updated: 05/26/25 1204     TSH 3RD GENERATON 1.743 uIU/mL     Lipid panel [[id number]]  (Abnormal) Collected: 05/26/25 0526    Lab Status: Final result Specimen: Blood from Arm, Right Updated: 05/26/25 1024     Cholesterol 237 mg/dL      Triglycerides 112 mg/dL      HDL, Direct 50 mg/dL      LDL Calculated 165 mg/dL      Non-HDL-Chol (CHOL-HDL) 187 mg/dl     HS Troponin I 2hr [[id number]]  (Normal) Collected: 05/26/25 0526    Lab Status: Final result Specimen: Blood from Arm, Right Updated: 05/26/25 0555     hs TnI 2hr 6 ng/L      Delta 2hr hsTnI 0 ng/L     HS Troponin 0hr (reflex protocol) [[id number]]  (Normal) Collected: 05/26/25 0321    Lab Status: Final result Specimen: Blood from Arm, Right Updated: 05/26/25 0350     hs TnI 0hr 6 ng/L     Lactic acid, plasma (w/reflex if result > 2.0) [[id number]]  (Normal) Collected: 05/26/25 0321    Lab Status: Final result Specimen: Blood from Arm, Right Updated: 05/26/25 0346     LACTIC ACID 1.5 mmol/L     Narrative:      Result may be elevated if tourniquet was used during collection.    Comprehensive metabolic panel [[id number]]  (Abnormal) Collected: 05/26/25 0321    Lab Status: Final result Specimen: Blood from Arm, Right Updated: 05/26/25 0346     Sodium 132 mmol/L      Potassium 3.7 mmol/L      Chloride 100 mmol/L      CO2 24 mmol/L      ANION  GAP 8 mmol/L      BUN 14 mg/dL      Creatinine 0.53 mg/dL      Glucose 341 mg/dL      Calcium 8.4 mg/dL      AST 11 U/L      ALT 16 U/L      Alkaline Phosphatase 126 U/L      Total Protein 6.6 g/dL      Albumin 3.6 g/dL      Total Bilirubin 0.23 mg/dL      eGFR 120 ml/min/1.73sq m     Narrative:      National Kid[employer] guidelines for Chronic Kidney Disease (CKD):     Stage 1 with normal or high GFR (GFR > 90 mL/min/1.73 square meters)    Stage 2 Mild CKD (GFR = 60-89 mL/min/1.73 square meters)    Stage 3A Moderate CKD (GFR = 45-59 mL/min/1.73 square meters)    Stage 3B Moderate CKD (GFR = 30-44 mL/min/1.73 square meters)    Stage 4 Severe CKD (GFR = 15-29 mL/min/1.73 square meters)    Stage 5 End Stage CKD (GFR <15 mL/min/1.73 square meters)  Note: GFR calculation is accurate only with a steady state creatinine    Magnesium [[id number]]  (Normal) Collected: 05/26/25 0321    Lab Status: Final result Specimen: Blood from Arm, Right Updated: 05/26/25 0346     Magnesium 1.9 mg/dL     CBC and differential [[id number]]  (Abnormal) Collected: 05/26/25 0321    Lab Status: Final result Specimen: Blood from Arm, Right Updated: 05/26/25 0326     WBC 10.89 Thousand/uL      RBC 5.12 Million/uL      Hemoglobin 13.4 g/dL      Hematocrit 37.6 %      MCV 73 fL      MCH 26.2 pg      MCHC 35.6 g/dL      RDW 15.0 %      MPV 10.4 fL      Platelets 393 Thousands/uL      nRBC 0 /100 WBCs      Segmented % 61 %      Immature Grans % 1 %      Lymphocytes % 30 %      Monocytes % 4 %      Eosinophils Relative 3 %      Basophils Relative 1 %      Absolute Neutrophils 6.78 Thousands/µL      Absolute Immature Grans 0.06 Thousand/uL      Absolute Lymphocytes 3.23 Thousands/µL      Absolute Monocytes 0.43 Thousand/µL      Eosinophils Absolute 0.34 Thousand/µL      Basophils Absolute 0.05 Thousands/µL     Fingerstick Glucose (POCT) [[id number]]  (Abnormal) Collected: 05/26/25 0247    Lab Status: Final result Specimen: Blood Updated:  05/26/25 0248     POC Glucose 354 mg/dl             VAS NIKKI and waveform analysis, multiple levels   Final Interpretation by Savi Langford MD (06/02 1505)      IR lower extremity angiogram   Final Interpretation by Stevan Montez MD (05/31 0808)      Drug-coated balloon angioplasty of the right superficial femoral artery and proximal popliteal artery.         Workstation performed: [id number]         MRI cervical spine wo contrast   Final Interpretation by E. Alec Schoenberger, MD (05/29 1632)   No disc herniation, canal or foraminal stenosis.               Workstation performed: [id number]         XR wrist 3+ vw left   Final Interpretation by Jigar Perkins MD (05/28 1559)      No acute osseous abnormality.      Workstation performed: [id number]         XR forearm 2 vw left   Final Interpretation by Jigar Perkins MD (05/28 1603)      No acute osseous abnormality.         Workstation performed: [id number]         XR hand 3+ vw left   Final Interpretation by Jigar Perkins MD (05/28 1602)      No acute osseous abnormality.            Workstation performed: [id number]         MRI brain wo contrast   Final Interpretation by Celestine Ley MD (05/27 0829)      1.  No acute infarction.   2.  Chronic microangiopathic changes and multifocal chronic infarctions as above, advanced for age.      Workstation performed: [id number]         XR ankle 2 vw right   Final Interpretation by Tawny Hand MD (05/27 1435)   No acute osseous abnormality.   Chronic findings, as above.         Computerized Assisted Algorithm (CAA) may have been used to analyze all applicable images.               Workstation performed: [id number]         XR foot 2 vw right   Final Interpretation by Tawny Hand MD (05/27 1435)   No acute osseous abnormality.   Chronic findings, as above.         Computerized Assisted Algorithm (CAA) may have been used to analyze all applicable images.               Workstation performed: [id number]        "  CTA head and neck with and without contrast   Final Interpretation by Mati Heath MD (05/26 0512)      CT Brain: No acute intracranial hemorrhage. Sequelae of old infarcts in the left thalamus, left caudate, and left gangliocapsular region again seen. If clinical concern for acute ischemia, consider more sensitive MRI brain for better evaluation.    Opacification of the right mastoid air cells with soft tissue density material in the right middle ear cavity noted which could suggest sequelae of otomastoiditis. Recommend correlation with ENT exam findings.      CT Angiography: Unremarkable CTA neck and brain without large vessel arterial occlusion, significant flow-limiting stenosis, or focal saccular aneurysm. No enhancing brain mass/fluid collection or evidence of vascular malformation. No enhancing soft    tissue neck mass/fluid collection or cervical lymphadenopathy.               Workstation performed: [id number]             Procedures    ED Medication and Procedure Management   Prior to Admission Medications   Prescriptions Last Dose Informant Patient Reported? Taking?   Advair -21 MCG/ACT inhaler Past Week Self No Yes   Sig: Inhale 2 puffs 2 (two) times a day Rinse mouth after use   Alcohol Swabs (B-D SINGLE USE SWABS REGULAR) PADS 5/26/2025 Morning  No Yes   Sig: USE 3-4 TIMES DAILY WITH PEN   B-D INS SYR MICROFINE .5CC/28G 28G X 1/2\" 0.5 ML MISC 5/25/2025  No Yes   Sig: USE AS DIRECTED   BD Pen Needle Leann 2nd Gen 32G X 4 MM MISC 5/25/2025  No Yes   Sig: INJECT UNDER THE SKIN 4 (FOUR) TIMES A DAY (BEFORE MEALS AND AT BEDTIME)   Blood Glucose Monitoring Suppl (ONE TOUCH ULTRA 2) w/Device KIT 5/26/2025 Morning Self No Yes   Sig: BY DEVICE ROUTE 2 (TWO) TIMES A DAY CHECK BLOOD SUGARS AND RECORD VALUE AND TIME OF DAY TWICE A DAY   Blood Pressure Monitoring (Blood Pressure Monitor Automat) KATLYN 5/25/2025 Self No Yes   Sig: Use Daily as Directed   Comfort EZ Pen Needles 33G X 4 MM MISC 5/25/2025 Self No " "Yes   Sig: USE AS DIRECTED   DULoxetine (CYMBALTA) 30 mg delayed release capsule 2025  No Yes   Sig: TAKE 2 CAPSULES (60 MG TOTAL) BY MOUTH DAILY   Global Inject Ease Lancets 30G MISC More than a month Self No No   Sig: USE 3 (THREE) TIMES A DAY   Lantus 100 UNIT/ML subcutaneous injection More than a month  No No   Sig: INJECT 30 UNITS UNDER THE SKIN EVERY 12 (TWELVE) HOURS   OneTouch Ultra test strip More than a month Self No No   Sig: Use 1 each daily as needed (before meals) Use as instructed   Patient not taking: Reported on 2025   Sure Comfort Insulin Syringe 31G X \" 0.3 ML MISC Not Taking Self Yes No   Si (two) times a day Use as directed   Patient not taking: Reported on 2025   Trulicity 1.5 MG/0.5ML SOAJ Not Taking  No No   Sig: AS DIRECTED WEEKLY   Patient not taking: Reported on 2025   Xarelto 20 MG tablet 2025  No Yes   Sig: TAKE 1 TABLET (20 MG TOTAL) BY MOUTH DAILY WITH BREAKFAST   acetaminophen (TYLENOL) 500 mg tablet Not Taking  No Yes   Sig: Take 2 tablets (1,000 mg total) by mouth every 8 (eight) hours as needed for mild pain   albuterol (PROVENTIL HFA,VENTOLIN HFA) 90 mcg/act inhaler Past Week  No Yes   Sig: INHALE 2 PUFFS EVERY 4 HOURS AS NEEDED FOR WHEEZING OR SHORTNESS OF BREATH   aspirin 81 mg chewable tablet   No No   Sig: CHEW 1 TABLET (81 MG TOTAL) DAILY   atorvastatin (LIPITOR) 40 mg tablet 2025  No Yes   Sig: TAKE 1 TABLET (40 MG TOTAL) BY MOUTH DAILY WITH DINNER   glycerin-hypromellose- (ARTIFICIAL TEARS) 0.2-0.2-1 % SOLN More than a month Self No No   Sig: Administer 2 drops to both eyes every 6 (six) hours as needed (for eye discomfort)   hydrOXYzine HCL (ATARAX) 50 mg tablet Not Taking Self No No   Sig: TAKE 1 TABLET (50 MG TOTAL) BY MOUTH DAILY AT BEDTIME AS NEEDED FOR ITCHING OR ANXIETY   Patient not taking: Reported on 2025   ibuprofen (MOTRIN) 600 mg tablet   No No   Sig: Take 1 tablet (600 mg total) by mouth every 8 (eight) hours " as needed for mild pain   insulin lispro (HumALOG/ADMELOG) 100 units/mL injection Past Week  No Yes   Sig: INJECT 4-16 UNITS UNDER THE SKIN 3 (THREE) TIMES A DAY BEFORE MEALS   ketorolac (TORADOL) 10 mg tablet Not Taking  No No   Sig: Take 1 tablet (10 mg total) by mouth every 6 (six) hours as needed for moderate pain   Patient not taking: Reported on 5/26/2025   ketotifen (ZADITOR) 0.025 % ophthalmic solution Not Taking Self No No   Sig: Administer 1 drop to both eyes 2 (two) times a day as needed (for eye discomfort)   Patient not taking: Reported on 5/26/2025   lidocaine (Lidoderm) 5 %   No No   Sig: Apply 1 patch topically over 12 hours daily Remove & Discard patch within 12 hours or as directed by MD   lisinopril (ZESTRIL) 20 mg tablet 5/25/2025  No Yes   Sig: TAKE 1 TABLET (20 MG TOTAL) BY MOUTH DAILY   magnesium chloride-calcium (Slow-Mag) 71.5-119 mg   No No   Sig: Take 2 tablets by mouth daily for 7 days   metFORMIN (GLUCOPHAGE) 1000 MG tablet More than a month  No No   Sig: TAKE ONE (1) TABLET BY MOUTH TWICE DAILY WITH FOOD   methocarbamol (ROBAXIN) 500 mg tablet Not Taking  No No   Sig: Take 1 tablet (500 mg total) by mouth 2 (two) times a day   Patient not taking: Reported on 5/26/2025   naproxen (Naprosyn) 500 mg tablet   No No   Sig: Take 1 tablet (500 mg total) by mouth 2 (two) times a day with meals for 7 days   nicotine (NICODERM CQ) 21 mg/24 hr TD 24 hr patch Not Taking Self No No   Sig: Place 1 patch on the skin over 24 hours daily   Patient not taking: Reported on 9/11/2024   prazosin (MINIPRESS) 1 mg capsule Not Taking  No No   Sig: TAKE ONE (1) CAPSULE ONCE A DAY AT BEDTIME FOR HYPERTENSION   Patient not taking: Reported on 5/26/2025   topiramate (TOPAMAX) 25 mg tablet Not Taking Self No No   Sig: TAKE 1 TABLET (25 MG TOTAL) BY MOUTH 2 (TWO) TIMES A DAY   Patient not taking: Reported on 5/26/2025      Facility-Administered Medications: None     Discharge Medication List as of 6/2/2025  3:26 PM         START taking these medications    Details   !! Alcohol Swabs 70 % PADS May substitute brand based on insurance coverage. Check glucose ACHS., Normal      !! Blood Glucose Monitoring Suppl (OneTouch Verio Reflect) w/Device KIT May substitute brand based on insurance coverage. Check glucose ACHS., Normal      cyclobenzaprine (FLEXERIL) 10 mg tablet Take 1 tablet (10 mg total) by mouth 3 (three) times a day as needed for muscle spasms, Starting Mon 6/2/2025, Normal      gabapentin (NEURONTIN) 300 mg capsule Take 1 capsule (300 mg total) by mouth 3 (three) times a day, Starting Mon 6/2/2025, Until Wed 7/2/2025, Normal      insulin degludec (Tresiba FlexTouch) 100 units/mL injection pen Inject 35 Units under the skin every 12 (twelve) hours, Starting Mon 6/2/2025, Normal      !! Insulin Pen Needle (BD Pen Needle Leann 2nd Gen) 32G X 4 MM MISC For use with insulin pen. Pharmacy may dispense brand covered by insurance., Normal      magnesium Oxide (MAG-OX) 400 mg TABS Take 1 tablet (400 mg total) by mouth 2 (two) times a day, Starting Mon 6/2/2025, Until Wed 7/2/2025, Normal      !! OneTouch Delica Lancets 33G MISC May substitute brand based on insurance coverage. Check glucose ACHS., Normal      oxyCODONE (ROXICODONE) 5 immediate release tablet Take 1 tablet (5 mg total) by mouth every 6 (six) hours as needed for severe pain for up to 3 days Max Daily Amount: 20 mg, Starting Mon 6/2/2025, Until Thu 6/5/2025 at 2359, Normal       !! - Potential duplicate medications found. Please discuss with provider.        CONTINUE these medications which have CHANGED    Details   Advair -21 MCG/ACT inhaler Inhale 2 puffs 2 (two) times a day Rinse mouth after use, Starting Mon 6/2/2025, Normal      albuterol (PROVENTIL HFA,VENTOLIN HFA) 90 mcg/act inhaler Inhale 1 puff every 4 (four) hours as needed for wheezing or shortness of breath, Starting Mon 6/2/2025, Normal      aspirin 81 mg chewable tablet Chew 1 tablet (81 mg  "total) daily, Starting Mon 6/2/2025, Until Wed 7/2/2025, Normal      atorvastatin (LIPITOR) 80 mg tablet Take 1 tablet (80 mg total) by mouth daily with dinner, Starting Mon 6/2/2025, Until Wed 7/2/2025, Normal      DULoxetine (CYMBALTA) 30 mg delayed release capsule Take 2 capsules (60 mg total) by mouth daily, Starting Mon 6/2/2025, Until Wed 7/2/2025, Normal      glucose blood (OneTouch Verio) test strip May substitute brand based on insurance coverage. Check glucose ACHS., Normal      lisinopril (ZESTRIL) 20 mg tablet Take 1 tablet (20 mg total) by mouth daily, Starting Mon 6/2/2025, Until Wed 7/2/2025, Normal      metFORMIN (GLUCOPHAGE) 1000 MG tablet Take 1 tablet (1,000 mg total) by mouth 2 (two) times a day with meals, Starting Mon 6/2/2025, Normal      prazosin (MINIPRESS) 1 mg capsule Take 1 capsule (1 mg total) by mouth daily at bedtime, Starting Mon 6/2/2025, Until Wed 7/2/2025, Normal      rivaroxaban (Xarelto) 20 mg tablet Take 1 tablet (20 mg total) by mouth daily with breakfast, Starting Mon 6/2/2025, Normal           CONTINUE these medications which have NOT CHANGED    Details   acetaminophen (TYLENOL) 500 mg tablet Take 2 tablets (1,000 mg total) by mouth every 8 (eight) hours as needed for mild pain, Starting Fri 12/20/2024, Normal      !! Alcohol Swabs (B-D SINGLE USE SWABS REGULAR) PADS USE 3-4 TIMES DAILY WITH PEN, Normal      B-D INS SYR MICROFINE .5CC/28G 28G X 1/2\" 0.5 ML MISC USE AS DIRECTED, Normal      !! BD Pen Needle Leann 2nd Gen 32G X 4 MM MISC INJECT UNDER THE SKIN 4 (FOUR) TIMES A DAY (BEFORE MEALS AND AT BEDTIME), Normal      !! Blood Glucose Monitoring Suppl (ONE TOUCH ULTRA 2) w/Device KIT BY DEVICE ROUTE 2 (TWO) TIMES A DAY CHECK BLOOD SUGARS AND RECORD VALUE AND TIME OF DAY TWICE A DAY, Normal      Blood Pressure Monitoring (Blood Pressure Monitor Automat) KATLYN Use Daily as Directed, Normal      !! Comfort EZ Pen Needles 33G X 4 MM MISC USE AS DIRECTED, Normal      !! Global " "Inject Ease Lancets 30G MISC USE 3 (THREE) TIMES A DAY, Starting Wed 11/1/2023, Normal      glycerin-hypromellose- (ARTIFICIAL TEARS) 0.2-0.2-1 % SOLN Administer 2 drops to both eyes every 6 (six) hours as needed (for eye discomfort), Starting Mon 8/28/2023, Normal      ibuprofen (MOTRIN) 600 mg tablet Take 1 tablet (600 mg total) by mouth every 8 (eight) hours as needed for mild pain, Starting Fri 12/20/2024, Normal      ketotifen (ZADITOR) 0.025 % ophthalmic solution Administer 1 drop to both eyes 2 (two) times a day as needed (for eye discomfort), Starting Mon 8/28/2023, Normal      nicotine (NICODERM CQ) 21 mg/24 hr TD 24 hr patch Place 1 patch on the skin over 24 hours daily, Starting Mon 9/18/2023, Normal      Sure Comfort Insulin Syringe 31G X 5/16\" 0.3 ML MISC 2 (two) times a day Use as directed, Starting u 11/9/2023, Historical Med      Trulicity 1.5 MG/0.5ML SOAJ AS DIRECTED WEEKLY, Normal       !! - Potential duplicate medications found. Please discuss with provider.        STOP taking these medications       insulin lispro (HumALOG/ADMELOG) 100 units/mL injection Comments:   Reason for Stopping:         hydrOXYzine HCL (ATARAX) 50 mg tablet Comments:   Reason for Stopping:         ketorolac (TORADOL) 10 mg tablet Comments:   Reason for Stopping:         Lantus 100 UNIT/ML subcutaneous injection Comments:   Reason for Stopping:         lidocaine (Lidoderm) 5 % Comments:   Reason for Stopping:         magnesium chloride-calcium (Slow-Mag) 71.5-119 mg Comments:   Reason for Stopping:         methocarbamol (ROBAXIN) 500 mg tablet Comments:   Reason for Stopping:         naproxen (Naprosyn) 500 mg tablet Comments:   Reason for Stopping:         topiramate (TOPAMAX) 25 mg tablet Comments:   Reason for Stopping:             Outpatient Discharge Orders   Ambulatory Referral to Endocrinology   Standing Status: Future Standing Exp. Date: 06/02/26      Ambulatory Referral to Neurology   Standing Status: " Future Standing Exp. Date: 06/02/26      Ambulatory Referral to Vascular Surgery   Standing Status: Future Standing Exp. Date: 06/02/26      Ambulatory Referral to Podiatry   Standing Status: Future Standing Exp. Date: 06/02/26      Discharge Diet     Activity as tolerated     ED SEPSIS DOCUMENTATION   Time reflects when diagnosis was documented in both MDM as applicable and the Disposition within this note       Time User Action Codes Description Comment    5/26/2025  5:11 AM Rosalina Fallon [M79.642] Left hand pain     5/26/2025  8:14 AM Rosalina Fallon [R29.90] Stroke-like symptoms     5/26/2025 11:02 AM LaneShanthi rendon Add [R20.0] Left arm numbness     5/26/2025 11:03 AM LaneRakesh rendonra Add [Z86.73] History of CVA (cerebrovascular accident)     5/26/2025 11:23 AM LaneRakesh rendonra Add [I73.9] Peripheral arterial disease (HCC)     5/26/2025 11:23 AM LaneRakesh rendonra Add [Z89.612] S/P AKA (above knee amputation) unilateral, left (HCC)     5/28/2025  7:14 AM LaneRakesh rendonra Add [S91.301A] Wound of right foot     5/28/2025 11:36 AM LaneRakesh rendonra Add [G56.92] Compression neuropathy of left upper extremity     6/2/2025  7:38 AM LaneRakesh rendonra Add [E11.42,  Z79.4] Type 2 diabetes mellitus with diabetic polyneuropathy, with long-term current use of insulin (HCC)     6/2/2025  2:17 PM Lane Shanthi Add [E11.9] Diabetes (HCC)     6/2/2025  2:18 PM Lane Shanthi Add [I73.9] PAD (peripheral artery disease)     6/2/2025  2:19 PM Lane Shanthi Add [I10] Hypertension     6/2/2025  2:20 PM Lane, Shanthi Add [J44.1] Chronic obstructive pulmonary disease with acute exacerbation (HCC)     6/2/2025  2:20 PM Lane, Shanthi Add [J44.9] Chronic obstructive pulmonary disease, unspecified COPD type (HCC)     6/2/2025  2:20 PM Shanthi Lane [F31.75] Bipolar disorder, in partial remission, most recent episode depressed (HCC)                      [1]   Past Medical History:  Diagnosis Date    Asthma     Atherosclerosis     Bipolar 1 disorder  "(HCC)     COPD (chronic obstructive pulmonary disease) (HCC)     Depression     Diabetes mellitus (HCC)     Hypertension     Psychiatric disorder     cutting history    PTSD (post-traumatic stress disorder)     Schizoaffective disorder (HCC)     Tendonitis    [2]   Past Surgical History:  Procedure Laterality Date    AMPUTATION ABOVE KNEE (AKA) Left 3/29/2023    Procedure: AMPUTATION ABOVE KNEE (AKA);  Surgeon: Savi Langford MD;  Location: [facility];  Service: Vascular    BYPASS FEMORAL-POPLITEAL Left 2021    Procedure: Left Common Femoral Below Knee to Popliteal Bypass with Insitu GSV graft.  Left Lower Extremity Angiogram;  Surgeon: Savi Langford MD;  Location: [facility];  Service: Vascular     SECTION      EAR SURGERY      FL LUMBAR PUNCTURE DIAGNOSTIC  10/25/2018    IR LOWER EXTREMITY ANGIOGRAM  2021    IR LOWER EXTREMITY ANGIOGRAM  2021    IR LOWER EXTREMITY ANGIOGRAM  3/24/2023    IR LOWER EXTREMITY ANGIOGRAM  2025    CT SLCTV CATHJ 3RD+ ORD SLCTV ABDL PEL/LXTR BRNCH Left 3/24/2023    Procedure: Left leg angiogram;  Surgeon: Byron Wahl DO;  Location: [facility];  Service: Vascular    TUBAL LIGATION     [3]   Family History  Problem Relation Name Age of Onset    Hypertension Mother      Diabetes Mother      HIV Mother      Heart disease Mother      No Known Problems Father     [4]   Social History  Tobacco Use    Smoking status: Every Day     Current packs/day: 1.00     Average packs/day: 1 pack/day for 22.0 years (22.0 ttl pk-yrs)     Types: Cigarettes     Start date: 3/24/2003     Last attempt to quit: 3/24/2023     Passive exposure: Current    Smokeless tobacco: Former     Quit date: 2022   Vaping Use    Vaping status: Never Used   Substance Use Topics    Alcohol use: Not Currently     Comment: not currently    Drug use: Yes     Types: \"Crack\" cocaine, Marijuana     Comment: 1 years clean from crack cocaine since         Rosalina Fallon MD  25 1219    "

## 2025-05-26 NOTE — ASSESSMENT & PLAN NOTE
Patient presented with distal left arm numbness/weakness/pain since 2 AM today. Patient reports having numbness on bilateral 5th fingers for the past month, however weakness & pain from left elbow down to left hand is new.  Cannot exclude possible stroke with prior CVA hx and noncompliance with medications, though with hx poorly controlled T2DM polyneuropathy is a possible etiology  Not a candidate for TNK, reports taking her Xarelto at around 2:30 AM at home PTA  CTA head/neck: No acute intracranial hemorrhage, sequela of old infarction left thalamus, left caudate and left ganglial capsular region. No large vessel arterial occlusion, significant stenosis or focal aneurysm.  Does not meet SIRS criteria, nl lactic acid & negative troponin x2. EKG with no acute ischemic changes.  Received aspirin 325 mg in ED, restart statin & Xarelto  Check lipid panel, HgbA1c, MRI brain, Echo  Neurology consult, placed on telemetry & stroke pathway  Pain management with IV Toradol, Tylenol, Robaxin, PRN oxycodone and IV morphine; patient declines gabapentin.

## 2025-05-26 NOTE — ASSESSMENT & PLAN NOTE
Pseudohyponatremia in setting of hyperglycemia, corrected Na 136  Goal normoglycemia, check BMP in a.m. & monitor electrolytes

## 2025-05-26 NOTE — ASSESSMENT & PLAN NOTE
Lab Results   Component Value Date    HGBA1C 11.1 (H) 08/12/2024    HGBA1C 10.7 (H) 08/12/2024     Recent Labs     05/26/25  0247 05/26/25  1011 05/26/25  1100   POCGLU 354* 393* 394*     Blood Sugar Average: Last 72 hrs:  (P) [id number]    Poorly controlled T2DM per most recent HgbA1c 11. Patient admits to noncompliance with insulin/medications, notes difficulty with affording and running out of supplies for glucometer & recently applied for Medicaid.  Hyperglycemic with BG 300s in ED, though not in DKA.  Prior home regimen: Lantus 30U BID, Humalog SSI 4-16U TID, Trulicity weekly injections.   Obtain updated HgbA1c while inpatient  Resume Lantus at 25U BID to start  SSI coverage with Accu-Cheks ACHS  Carb controlled diet, hypoglycemia protocol

## 2025-05-26 NOTE — CONSULTS
39-year-old female with a past medical history of tobacco use, COPD, CVA, hypertension, poorly controlled DM, bipolar, CLTI status post failed left fem below-knee pop bypass with subsequent left above-knee amputation presenting to [facility] with strokelike symptoms.  Patient's primary team was reach out to vascular due to patient's inability to get to her vascular appointments and concern for some right foot wounds.  Patient's last duplex in March 2025 showed right proximal SFA stenosis with an NIKKI of 0.57  (Prior: 0.79)/77 mm Hg/ 50 mm Hg    In the setting of borderline toe pressures and wounds on the right foot I believe patient would likely benefit from an angiogram however I would like her further worked up and optimized specially since she is presenting with concerns for CVA.  Discussed with primary team please reach out to vascular again once things have become more stabilized for her.

## 2025-05-26 NOTE — PLAN OF CARE
Problem: PAIN - ADULT  Goal: Verbalizes/displays adequate comfort level or baseline comfort level  Description: Interventions:  - Encourage patient to monitor pain and request assistance  - Assess pain using appropriate pain scale  - Administer analgesics as ordered based on type and severity of pain and evaluate response  - Implement non-pharmacological measures as appropriate and evaluate response  - Consider cultural and social influences on pain and pain management  - Notify physician/advanced practitioner if interventions unsuccessful or patient reports new pain  - Educate patient/family on pain management process including their role and importance of  reporting pain   - Provide non-pharmacologic/complimentary pain relief interventions  Outcome: Progressing     Problem: INFECTION - ADULT  Goal: Absence or prevention of progression during hospitalization  Description: INTERVENTIONS:  - Assess and monitor for signs and symptoms of infection  - Monitor lab/diagnostic results  - Monitor all insertion sites, i.e. indwelling lines, tubes, and drains  - Monitor endotracheal if appropriate and nasal secretions for changes in amount and color  - [place] appropriate cooling/warming therapies per order  - Administer medications as ordered  - Instruct and encourage patient and family to use good hand hygiene technique  - Identify and instruct in appropriate isolation precautions for identified infection/condition  Outcome: Progressing     Problem: SAFETY ADULT  Goal: Maintain or return to baseline ADL function  Description: INTERVENTIONS:  -  Assess patient's ability to carry out ADLs; assess patient's baseline for ADL function and identify physical deficits which impact ability to perform ADLs (bathing, care of mouth/teeth, toileting, grooming, dressing, etc.)  - Assess/evaluate cause of self-care deficits   - Assess range of motion  - Assess patient's mobility; develop plan if impaired  - Assess patient's need for  assistive devices and provide as appropriate  - Encourage maximum independence but intervene and supervise when necessary  - Involve family in performance of ADLs  - Assess for home care needs following discharge   - Consider OT consult to assist with ADL evaluation and planning for discharge  - Provide patient education as appropriate  - Monitor functional capacity and physical performance, use of AM PAC & JH-HLM   - Monitor gait, balance and fatigue with ambulation    Outcome: Progressing     Problem: Neurological Deficit  Goal: Neurological status is stable or improving  Description: Interventions:  - Monitor and assess patient's level of consciousness, motor function, sensory function, and level of assistance needed for ADLs.   - Monitor and report changes from baseline. Collaborate with interdisciplinary team to initiate plan and implement interventions as ordered.   - Provide and maintain a safe environment.  - Consider seizure precautions.  - Consider fall precautions.  - Consider aspiration precautions.  - Consider bleeding precautions.  Outcome: Progressing     Problem: Activity Intolerance/Impaired Mobility  Goal: Mobility/activity is maintained at optimum level for patient  Description: Interventions:  - Assess and monitor patient  barriers to mobility and need for assistive/adaptive devices.  - Assess patient's emotional response to limitations.  - Collaborate with interdisciplinary team and initiate plans and interventions as ordered.  - Encourage independent activity per ability.  - Maintain proper body alignment.  - Perform active/passive rom as tolerated/ordered.  - Plan activities to conserve energy.  - Turn patient as appropriate  Outcome: Progressing     Problem: Communication Impairment  Goal: Ability to express needs and understand communication  Description: Assess patient's communication skills and ability to understand information.  Patient will demonstrate use of effective communication  techniques, alternative methods of communication and understanding even if not able to speak.     - Encourage communication and provide alternate methods of communication as needed.  - Collaborate with case management/ for discharge needs.  - Include patient/family/caregiver in decisions related to communication.  Outcome: Progressing     Problem: Potential for Aspiration  Goal: Non-ventilated patient's risk of aspiration is minimized  Description: Assess and monitor vital signs, respiratory status, and labs (WBC).  Monitor for signs of aspiration (tachypnea, cough, rales, wheezing, cyanosis, fever).    - Assess and monitor patient's ability to swallow.  - Place patient up in chair to eat if possible.  - HOB up at 90 degrees to eat if unable to get patient up into chair.  - Supervise patient during oral intake.   - Instruct patient/ family to take small bites.  - Instruct patient/ family to take small single sips when taking liquids.  - Follow patient-specific strategies generated by speech pathologist.  Outcome: Progressing     Problem: Nutrition  Goal: Nutrition/Hydration status is improving  Description: Monitor and assess patient's nutrition/hydration status for malnutrition (ex- brittle hair, bruises, dry skin, pale skin and conjunctiva, muscle wasting, smooth red tongue, and disorientation). Collaborate with interdisciplinary team and initiate plan and interventions as ordered.  Monitor patient's weight and dietary intake as ordered or per policy. Utilize nutrition screening tool and intervene per policy. Determine patient's food preferences and provide high-protein, high-caloric foods as appropriate.     - Assist patient with eating.  - Allow adequate time for meals.  - Encourage patient to take dietary supplement as ordered.  - Collaborate with clinical nutritionist.  - Include patient/family/caregiver in decisions related to nutrition.  Outcome: Progressing

## 2025-05-27 ENCOUNTER — APPOINTMENT (OUTPATIENT)
Dept: MRI IMAGING | Facility: HOSPITAL | Age: 40
DRG: 038 | End: 2025-05-27
Payer: COMMERCIAL

## 2025-05-27 ENCOUNTER — APPOINTMENT (INPATIENT)
Dept: NON INVASIVE DIAGNOSTICS | Facility: HOSPITAL | Age: 40
DRG: 038 | End: 2025-05-27
Payer: COMMERCIAL

## 2025-05-27 PROBLEM — M79.642 LEFT HAND PAIN: Status: ACTIVE | Noted: 2025-05-26

## 2025-05-27 PROBLEM — Z86.73 HISTORY OF CVA (CEREBROVASCULAR ACCIDENT): Status: ACTIVE | Noted: 2025-05-27

## 2025-05-27 LAB
ALBUMIN SERPL BCG-MCNC: 3.5 G/DL (ref 3.5–5)
ALP SERPL-CCNC: 108 U/L (ref 34–104)
ALT SERPL W P-5'-P-CCNC: 15 U/L (ref 7–52)
ANION GAP SERPL CALCULATED.3IONS-SCNC: 8 MMOL/L (ref 4–13)
AORTIC ROOT: 2.7 CM
ASCENDING AORTA: 2.7 CM
AST SERPL W P-5'-P-CCNC: 10 U/L (ref 13–39)
ATRIAL RATE: 82 BPM
BASOPHILS # BLD AUTO: 0.04 THOUSANDS/ÂΜL (ref 0–0.1)
BASOPHILS NFR BLD AUTO: 0 % (ref 0–1)
BILIRUB SERPL-MCNC: 0.25 MG/DL (ref 0.2–1)
BSA FOR ECHO PROCEDURE: 1.97 M2
BUN SERPL-MCNC: 13 MG/DL (ref 5–25)
CALCIUM SERPL-MCNC: 8.5 MG/DL (ref 8.4–10.2)
CHLORIDE SERPL-SCNC: 99 MMOL/L (ref 96–108)
CO2 SERPL-SCNC: 27 MMOL/L (ref 21–32)
CREAT SERPL-MCNC: 0.41 MG/DL (ref 0.6–1.3)
E WAVE DECELERATION TIME: 176 MS
E/A RATIO: 1.41
EOSINOPHIL # BLD AUTO: 0.42 THOUSAND/ÂΜL (ref 0–0.61)
EOSINOPHIL NFR BLD AUTO: 4 % (ref 0–6)
ERYTHROCYTE [DISTWIDTH] IN BLOOD BY AUTOMATED COUNT: 14.8 % (ref 11.6–15.1)
EST. AVERAGE GLUCOSE BLD GHB EST-MCNC: 335 MG/DL
FRACTIONAL SHORTENING: 31 (ref 28–44)
GFR SERPL CREATININE-BSD FRML MDRD: 131 ML/MIN/1.73SQ M
GLUCOSE SERPL-MCNC: 175 MG/DL (ref 65–140)
GLUCOSE SERPL-MCNC: 182 MG/DL (ref 65–140)
GLUCOSE SERPL-MCNC: 205 MG/DL (ref 65–140)
GLUCOSE SERPL-MCNC: 208 MG/DL (ref 65–140)
GLUCOSE SERPL-MCNC: 346 MG/DL (ref 65–140)
HBA1C MFR BLD: 13.3 %
HCT VFR BLD AUTO: 37.9 % (ref 34.8–46.1)
HGB BLD-MCNC: 13.4 G/DL (ref 11.5–15.4)
IMM GRANULOCYTES # BLD AUTO: 0.05 THOUSAND/UL (ref 0–0.2)
IMM GRANULOCYTES NFR BLD AUTO: 1 % (ref 0–2)
INTERVENTRICULAR SEPTUM IN DIASTOLE (PARASTERNAL SHORT AXIS VIEW): 1.3 CM
INTERVENTRICULAR SEPTUM: 1.3 CM (ref 0.6–1.1)
LAAS-AP2: 14.4 CM2
LAAS-AP4: 14.6 CM2
LEFT ATRIUM SIZE: 3.8 CM
LEFT ATRIUM VOLUME (MOD BIPLANE): 33 ML
LEFT ATRIUM VOLUME INDEX (MOD BIPLANE): 16.8 ML/M2
LEFT INTERNAL DIMENSION IN SYSTOLE: 2.9 CM (ref 2.1–4)
LEFT VENTRICULAR INTERNAL DIMENSION IN DIASTOLE: 4.2 CM (ref 3.5–6)
LEFT VENTRICULAR POSTERIOR WALL IN END DIASTOLE: 1.3 CM
LEFT VENTRICULAR STROKE VOLUME: 44 ML
LV EF US.2D.A4C+ESTIMATED: 62 %
LVSV (TEICH): 44 ML
LYMPHOCYTES # BLD AUTO: 3.41 THOUSANDS/ÂΜL (ref 0.6–4.47)
LYMPHOCYTES NFR BLD AUTO: 36 % (ref 14–44)
MAGNESIUM SERPL-MCNC: 1.8 MG/DL (ref 1.9–2.7)
MCH RBC QN AUTO: 26.3 PG (ref 26.8–34.3)
MCHC RBC AUTO-ENTMCNC: 35.4 G/DL (ref 31.4–37.4)
MCV RBC AUTO: 75 FL (ref 82–98)
MONOCYTES # BLD AUTO: 0.44 THOUSAND/ÂΜL (ref 0.17–1.22)
MONOCYTES NFR BLD AUTO: 5 % (ref 4–12)
MV E'TISSUE VEL-LAT: 11 CM/S
MV E'TISSUE VEL-SEP: 7 CM/S
MV PEAK A VEL: 0.58 M/S
MV PEAK E VEL: 82 CM/S
MV STENOSIS PRESSURE HALF TIME: 51 MS
MV VALVE AREA P 1/2 METHOD: 4.31
NEUTROPHILS # BLD AUTO: 5.12 THOUSANDS/ÂΜL (ref 1.85–7.62)
NEUTS SEG NFR BLD AUTO: 54 % (ref 43–75)
NRBC BLD AUTO-RTO: 0 /100 WBCS
P AXIS: 43 DEGREES
PLATELET # BLD AUTO: 379 THOUSANDS/UL (ref 149–390)
PMV BLD AUTO: 11 FL (ref 8.9–12.7)
POTASSIUM SERPL-SCNC: 3.5 MMOL/L (ref 3.5–5.3)
PR INTERVAL: 156 MS
PROT SERPL-MCNC: 6.5 G/DL (ref 6.4–8.4)
QRS AXIS: 48 DEGREES
QRSD INTERVAL: 94 MS
QT INTERVAL: 366 MS
QTC INTERVAL: 427 MS
RBC # BLD AUTO: 5.09 MILLION/UL (ref 3.81–5.12)
RIGHT ATRIUM AREA SYSTOLE A4C: 11.4 CM2
RIGHT VENTRICLE ID DIMENSION: 2.8 CM
SL CV LEFT ATRIUM LENGTH A2C: 5 CM
SL CV LV EF: 60
SL CV PED ECHO LEFT VENTRICLE DIASTOLIC VOLUME (MOD BIPLANE) 2D: 77 ML
SL CV PED ECHO LEFT VENTRICLE SYSTOLIC VOLUME (MOD BIPLANE) 2D: 33 ML
SODIUM SERPL-SCNC: 134 MMOL/L (ref 135–147)
T WAVE AXIS: 13 DEGREES
TRICUSPID ANNULAR PLANE SYSTOLIC EXCURSION: 1.7 CM
VENTRICULAR RATE: 82 BPM
WBC # BLD AUTO: 9.48 THOUSAND/UL (ref 4.31–10.16)

## 2025-05-27 PROCEDURE — 85025 COMPLETE CBC W/AUTO DIFF WBC: CPT | Performed by: PHYSICIAN ASSISTANT

## 2025-05-27 PROCEDURE — 83735 ASSAY OF MAGNESIUM: CPT | Performed by: PHYSICIAN ASSISTANT

## 2025-05-27 PROCEDURE — 82948 REAGENT STRIP/BLOOD GLUCOSE: CPT

## 2025-05-27 PROCEDURE — 70551 MRI BRAIN STEM W/O DYE: CPT

## 2025-05-27 PROCEDURE — 93306 TTE W/DOPPLER COMPLETE: CPT

## 2025-05-27 PROCEDURE — 99232 SBSQ HOSP IP/OBS MODERATE 35: CPT | Performed by: PHYSICIAN ASSISTANT

## 2025-05-27 PROCEDURE — 93306 TTE W/DOPPLER COMPLETE: CPT | Performed by: INTERNAL MEDICINE

## 2025-05-27 PROCEDURE — 80053 COMPREHEN METABOLIC PANEL: CPT | Performed by: PHYSICIAN ASSISTANT

## 2025-05-27 PROCEDURE — 99448 NTRPROF PH1/NTRNET/EHR 21-30: CPT | Performed by: PSYCHIATRY & NEUROLOGY

## 2025-05-27 PROCEDURE — 93010 ELECTROCARDIOGRAM REPORT: CPT | Performed by: INTERNAL MEDICINE

## 2025-05-27 PROCEDURE — NC001 PR NO CHARGE

## 2025-05-27 RX ORDER — MAGNESIUM SULFATE 1 G/100ML
1 INJECTION INTRAVENOUS ONCE
Status: DISCONTINUED | OUTPATIENT
Start: 2025-05-27 | End: 2025-05-27

## 2025-05-27 RX ORDER — HYDRALAZINE HYDROCHLORIDE 20 MG/ML
5 INJECTION INTRAMUSCULAR; INTRAVENOUS EVERY 6 HOURS PRN
Status: DISCONTINUED | OUTPATIENT
Start: 2025-05-27 | End: 2025-06-02 | Stop reason: HOSPADM

## 2025-05-27 RX ORDER — LANOLIN ALCOHOL/MO/W.PET/CERES
400 CREAM (GRAM) TOPICAL 2 TIMES DAILY
Status: DISCONTINUED | OUTPATIENT
Start: 2025-05-27 | End: 2025-06-02 | Stop reason: HOSPADM

## 2025-05-27 RX ORDER — GABAPENTIN 100 MG/1
100 CAPSULE ORAL 3 TIMES DAILY
Status: DISCONTINUED | OUTPATIENT
Start: 2025-05-27 | End: 2025-06-01

## 2025-05-27 RX ADMIN — OXYCODONE 5 MG: 5 TABLET ORAL at 11:26

## 2025-05-27 RX ADMIN — ACETAMINOPHEN 975 MG: 325 TABLET, FILM COATED ORAL at 21:03

## 2025-05-27 RX ADMIN — KETOROLAC TROMETHAMINE 15 MG: 30 INJECTION, SOLUTION INTRAMUSCULAR at 06:06

## 2025-05-27 RX ADMIN — NICOTINE 1 PATCH: 21 PATCH, EXTENDED RELEASE TRANSDERMAL at 08:36

## 2025-05-27 RX ADMIN — MORPHINE SULFATE 4 MG: 4 INJECTION INTRAVENOUS at 03:02

## 2025-05-27 RX ADMIN — INSULIN LISPRO 1 UNITS: 100 INJECTION, SOLUTION INTRAVENOUS; SUBCUTANEOUS at 16:32

## 2025-05-27 RX ADMIN — INSULIN LISPRO 2 UNITS: 100 INJECTION, SOLUTION INTRAVENOUS; SUBCUTANEOUS at 11:38

## 2025-05-27 RX ADMIN — METHOCARBAMOL TABLETS 750 MG: 750 TABLET, COATED ORAL at 06:06

## 2025-05-27 RX ADMIN — ACETAMINOPHEN 975 MG: 325 TABLET, FILM COATED ORAL at 06:06

## 2025-05-27 RX ADMIN — OXYCODONE 5 MG: 5 TABLET ORAL at 20:34

## 2025-05-27 RX ADMIN — METHOCARBAMOL TABLETS 750 MG: 750 TABLET, COATED ORAL at 00:30

## 2025-05-27 RX ADMIN — OXYCODONE 5 MG: 5 TABLET ORAL at 05:13

## 2025-05-27 RX ADMIN — METHOCARBAMOL TABLETS 750 MG: 750 TABLET, COATED ORAL at 18:27

## 2025-05-27 RX ADMIN — MORPHINE SULFATE 4 MG: 4 INJECTION INTRAVENOUS at 21:43

## 2025-05-27 RX ADMIN — GABAPENTIN 100 MG: 100 CAPSULE ORAL at 10:28

## 2025-05-27 RX ADMIN — GABAPENTIN 100 MG: 100 CAPSULE ORAL at 21:03

## 2025-05-27 RX ADMIN — KETOROLAC TROMETHAMINE 15 MG: 30 INJECTION, SOLUTION INTRAMUSCULAR at 18:27

## 2025-05-27 RX ADMIN — INSULIN GLARGINE 25 UNITS: 100 INJECTION, SOLUTION SUBCUTANEOUS at 08:17

## 2025-05-27 RX ADMIN — INSULIN LISPRO 4 UNITS: 100 INJECTION, SOLUTION INTRAVENOUS; SUBCUTANEOUS at 21:03

## 2025-05-27 RX ADMIN — Medication 400 MG: at 18:27

## 2025-05-27 RX ADMIN — Medication 400 MG: at 11:00

## 2025-05-27 RX ADMIN — INSULIN LISPRO 1 UNITS: 100 INJECTION, SOLUTION INTRAVENOUS; SUBCUTANEOUS at 08:41

## 2025-05-27 RX ADMIN — ATORVASTATIN CALCIUM 80 MG: 40 TABLET, FILM COATED ORAL at 16:31

## 2025-05-27 RX ADMIN — KETOROLAC TROMETHAMINE 15 MG: 30 INJECTION, SOLUTION INTRAMUSCULAR at 13:23

## 2025-05-27 RX ADMIN — ASPIRIN 81 MG 81 MG: 81 TABLET ORAL at 08:18

## 2025-05-27 RX ADMIN — METHOCARBAMOL TABLETS 750 MG: 750 TABLET, COATED ORAL at 11:25

## 2025-05-27 RX ADMIN — KETOROLAC TROMETHAMINE 15 MG: 30 INJECTION, SOLUTION INTRAMUSCULAR at 00:30

## 2025-05-27 RX ADMIN — DULOXETINE HYDROCHLORIDE 60 MG: 60 CAPSULE, DELAYED RELEASE ORAL at 08:18

## 2025-05-27 RX ADMIN — ACETAMINOPHEN 975 MG: 325 TABLET, FILM COATED ORAL at 13:22

## 2025-05-27 RX ADMIN — MORPHINE SULFATE 4 MG: 4 INJECTION INTRAVENOUS at 16:30

## 2025-05-27 RX ADMIN — GABAPENTIN 100 MG: 100 CAPSULE ORAL at 16:32

## 2025-05-27 RX ADMIN — PRAZOSIN HYDROCHLORIDE 1 MG: 1 CAPSULE ORAL at 21:02

## 2025-05-27 RX ADMIN — INSULIN GLARGINE 25 UNITS: 100 INJECTION, SOLUTION SUBCUTANEOUS at 21:03

## 2025-05-27 RX ADMIN — RIVAROXABAN 20 MG: 20 TABLET, FILM COATED ORAL at 08:17

## 2025-05-27 NOTE — CASE MANAGEMENT
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, left leg (HCC) 2024    Tobacco abuse 2024    Iron deficiency anemia 2023    Urinary incontinence 2023    Constipation 2023    Status post fall 2023    Pyuria 2023    Postoperative blood loss anemia 2023    Leukocytosis 2023    Class 3 severe obesity due to excess calories without serious comorbidity with body mass index (BMI) of 45.0 to 49.9 in adult 2022    Positive D dimer 10/02/2021    S/P AKA (above knee amputation) unilateral, left ([facility]) 2021    Bursitis of left knee 2021    Superficial femoral artery occlusion (HCC) 2021    Morbid obesity 2021    Peripheral arterial disease (HCC) 2021    Diarrhea 2021    Bilateral leg pain 2021    Leg pain 2020    Paresthesia 2020    COPD (chronic obstructive pulmonary disease)/asthma 2020    Nicotine dependence 2020    Syncope 2020    Hypertensive urgency 2020    Hydradenitis 2019    Chronic bilateral low back pain without sciatica 2018    Bipolar disorder 2018    Cocaine use 10/24/2018    Mixed hyperlipidemia 10/24/2018    Diplopia 10/24/2018    Essential hypertension 10/22/2018    Obstructive sleep apnea 2018    Trichomoniasis 2018    Type 2 diabetes mellitus with diabetic polyneuropathy, with long-term current use of insulin (HCC) 2018    Suicide attempt ([facility]) 2018    Uncontrolled type 2 diabetes mellitus with diabetic  polyneuropathy, without long-term current use of insulin 07/18/2018    Moderate persistent asthma without complication 04/10/2018    Diabetic autonomic neuropathy associated with type 2 diabetes mellitus (HCC) 04/10/2018      LOS (days): 0  Geometric Mean LOS (GMLOS) (days):   Days to GMLOS:     OBJECTIVE:     Current admission status: Observation     Preferred Pharmacy:   [facility] - [place], [place] - [address]  [address]  [place] [address]  Phone: [phone] Fax: [phone]    [facility], [place], [place] - [address] [facility]  [address] [facility] [address]  Phone: [phone] Fax: [phone]    Primary Care Provider: Mati Johnson MD    Primary Insurance: [employer]  Secondary Insurance:     ASSESSMENT:  Active Health Care Proxies    There are no active Health Care Proxies on file.    Advance Directives  Does patient have a Health Care POA?: No  Does patient have Advance Directives?: No    Readmission Root Cause  30 Day Readmission: No    Patient Information  Admitted from:: Home  Mental Status: Alert  During Assessment patient was accompanied by: Not accompanied during assessment  Assessment information provided by:: Patient  Primary Caregiver: Self  Support Systems: Spouse/significant other, Family members  County of Residence: [place]  What [facility] do you live in?: [place]  Home entry access options. Select all that apply.: No steps to enter home  Type of Current Residence: Apartment  Floor Level: 1  Upon entering residence, is there a bedroom on the main floor (no further steps)?: Yes  Upon entering residence, is there a bathroom on the main floor (no further steps)?: Yes  Living Arrangements: Lives w/ Spouse/significant other  Is patient a ?: No    Activities of Daily Living Prior to Admission  Functional Status: Independent  Completes ADLs independently?: Yes  Ambulates independently?: Yes  Does patient use assisted devices?:  "Yes  Assisted Devices (DME) used: Wheelchair  Does patient currently own DME?: Yes  What DME does the patient currently own?: Wheelchair  Does patient have a history of Outpatient Therapy (PT/OT)?: Yes  Does the patient have a history of Short-Term Rehab?: Yes  Does patient have a history of HHC?: Yes  Does patient currently have HHC?: No    Patient Information Continued  Income Source: SSI/SSD  Does patient have prescription coverage?: Yes  Can the patient afford their medications and any related supplies (such as glucometers or test strips)?: Yes  Does patient receive dialysis treatments?: No  Does patient have a history of substance abuse?: Yes  Historical substance use preference: \"Crack\" cocaine  History of Withdrawal Symptoms: Denies past symptoms  Is patient currently in treatment for substance abuse?: Patient provided treatment options.  Does patient have a history of Mental Health Diagnosis?: Yes (Bipolar)  Is patient receiving treatment for mental health?: Yes  Has patient received inpatient treatment related to mental health in the last 2 years?: No    Means of Transportation  Means of Transport to Appts:: Family transport    CM referred the patient to [facility] due to recent substance use. PT/OT/SLP evals pending for level of care recommendations. CM will continue to follow the patient through discharge.   "

## 2025-05-27 NOTE — ASSESSMENT & PLAN NOTE
History of cocaine use, patient admits to smoking crack cocaine 4 days PTA & marijuana 2 days PTA  UDS: + Cocaine and THC. No signs of withdrawal.  Strongly encouraged cessation of substance use

## 2025-05-27 NOTE — UTILIZATION REVIEW
WAS OBSERVATION 5/26/25 @ 0814 CONVERTED TO INPATIENT ADMISSION 5/27/25 @ 1305 DUE TO CONTINUED STAY REQUIRED TO CARE FOR PATIENT WITH L hand pain, HTN and Hyperglycemia requiring pain management, HTN and glycemic control.    Initial Clinical Review    Admission: Date/Time/Statement:   Admission Orders (From admission, onward)       Ordered        05/27/25 1305  INPATIENT ADMISSION  Once            05/26/25 0814  Place in Observation  Once                          Orders Placed This Encounter   Procedures    INPATIENT ADMISSION     Standing Status:   Standing     Number of Occurrences:   1     Level of Care:   Med Surg [16]     Estimated length of stay:   More than 2 Midnights     Certification:   I certify that inpatient services are medically necessary for this patient for a duration of greater than two midnights. See H&P and MD Progress Notes for additional information about the patient's course of treatment.     ED Arrival Information       Expected   -    Arrival   5/26/2025 02:40    Acuity   Urgent              Means of arrival   Ambulance    Escorted by   [facility]    Service   Hospitalist    Admission type   Emergency              Arrival complaint   Hand Pain             Chief Complaint   Patient presents with    Hand Pain     Patient presents to ED, c/o worsening numbness/pain to left hand which radiates up to her elbow. Reports numbness to left hand for 4 weeks, worsening over the last hour.        Initial Presentation: 39 y.o. female  w/ a PMH of Bipolar, CVA, obesity, poorly controlled T2DM, PAD, s/p left AKA, substance use, bipolar disorder, HLD, HTN, COPD, tobacco use, who presented by EMS to [facility] ED. Admitted as Observation for evaluation and treatment of Stroke-like symptoms. HTN urgency,  DM w/ Hyperglycemia.    Presented w/ paresthesias with numbness on bilateral fifth fingers as well as blurry vision for the past month, however upon waking at 2 AM today noted to have  severe pain, weakness and numbness/tingling from left elbow down to left hand/wrist. Pt admits to recent crack cocaine use 4 days PTA, also medication non-compliance due to financial difficulties however does report taking ASA and Xarelto at 2:30am prior to ambulance arrival. On exam,  S/p left AKA. Generalized tenderness to palpation of left forearm/wrist/hand, no point tenderness. Decreased sensation on left posterior forearm, unable to make fist on left hand. Small open wound on dorsum of right foot at base of first metatarsal, nontender with no surrounding erythema/edema or purulent drainage. Dry, scabbed/closed wound on lateral aspect of right ankle, nontender with no surrounding erythema/edema or purulent drainage. BP elevated 183/102. Abnormal Labs Na 132, Creat 0.53, Glucose 341, Alk phos 126, Chol 237, , WBC 10.89, UDS: +THC, Cocaine and Opiate. CTA head/neck: No acute intracranial hemorrhage, sequela of old infarction left thalamus, left caudate and left ganglial capsular region. No large vessel arterial occlusion, significant stenosis or focal aneurysm. Not a candidate for TNK, reports taking her Xarelto at around 2:30 AM at home PTA.  Please see below med list for meds given in the ED.    Plan: MRI brain, Echo, Tele, Pain control, Obtain updated A1c, SSI coverage w/ Accu-checks ACHS, Carb controlled diet. check XR right foot/ankle. Hold home Lisinopril and Prazosin, Permissive HTN pending MRI brain and neurology consult. Check BMP and monitor Electrolytes in am.  Neurology and Vascular consulted.    Vascular: Chronic limb threatening ischemia. Pt s/p failed left fem below-knee pop bypass with subsequent left above-knee amputation. Patient's primary team was reach out to vascular due to patient's inability to get to her vascular appointments and concern for some right foot wounds.  Patient's last duplex in March 2025 showed right proximal SFA stenosis with an NIKKI of 0.57  (Prior: 0.79)/77 mm Hg/ 50  mm Hg. In the setting of borderline toe pressures and wounds on R foot pt would benefit from angiogram however once stabilized from concerns of CVA.       Date: 5/27/25   Day 2: BED STATUS CHANGED TO INPATIENT.   Pt reports ongoing pain in left hand/wrist currently 8/10 though slightly improved, denies any other acute neurological symptoms otherwise.  Symptoms likely due to neuropathy, per Neurology - see below. On exam, S/p left AKA. ROM of left hand/wrist improved compared to 5/26, slightly limited by pain but able to make a fist. Abnormal labs: Na 134, Creat 0.41, Glucose 205, Alk phos 108, Mg 1.8. Plan: See below Neurology consult. Trial Gabapentin, if no improvement consider Ortho consult and/or neuromuscular eval in 4-6 wks. Pain control, ASA, Xarelto and Atorvastatin. Lantus, SSI coverage and Accu-Cheks ACHS. Carb control diet. F/U Echo. [id number]-[id number] Angiogram. Restart Lisinopriol and Prazosin. Replete Mg. F/U labs in am.     Neurology: Eval for acute CVA. Possible new onset compressive neuropathy given the distal lue numbness/weakness/pain from elbow done to hand superimposed on numbness bilateral 5th digit for the past month may be ulnar neuropathies. MRI brain w/o contrast is showing no acute strokes however multiple chronic strokes and significant white matter disease. Symptoms not consistent with a cervical radiculopathy or myelopathy. Plan: Consider ortho consult if symptoms not improving. Outpt neuromuscular evaluation by attending in 4-6 weeks. May need emg/ncs. Consider gabapentin 100 mg po tid for a couple days and see if it helps. Still high risk for future strokes, continue secondary stroke prevention.      Certification Statement: The patient will continue to require additional inpatient hospital stay due to pain management, HTN, glycemic control     Date: 5/28/25  Day 3: Has surpassed a 2nd midnight with active treatments and services.  Pt c/o ongoing, persistent pain in R hand/wrist  currently 10/10 sharp/shooting pain. On exam, ROM of left hand/wrist limited by pain, difficulty making fist. Abnormal labs: Creat 0.52, Glucose 235, Plan: Consult orthopedic surgery with ongoing pain, consider compression neuropathy of left elbow, Pain control, switch Robaxin to Flexeril, IV Toradol, Tylenol, Gabapentin, PRN Oxycodone and IV Morphine. Increase Lantus 25->30U BID, likely will need prescription & price check for diabetic supplies, SSI coverage w/ Accu-cheks ACHS.  [facility] transfer to [facility] for angiogram Friday 5/30.      Ortho Consult. Left hand pain and numbness. Plan: MRI cervical spin to evaluate for cervical radiculopathy or other cause. Consider Neuro consult given chronic angiopathic changes on brain MRI and possible CNS etiology. Xrays neg for fracture. No acute orthopaedic intervention indicated at this time. Consider EMG/NCS as outpatient.     Certification Statement: The patient will continue to require additional inpatient hospital stay due to pain in left hand/wrist     ED Treatment-Medication Administration from 05/26/2025 0240 to 05/26/2025 0857         Date/Time Order Dose Route Action     05/26/2025 0355 ketorolac (TORADOL) injection 15 mg 15 mg Intravenous Given     05/26/2025 0435 iohexol (OMNIPAQUE) 350 MG/ML injection (SINGLE-DOSE) 100 mL 100 mL Intravenous Given     05/26/2025 0457 morphine injection 4 mg 4 mg Intravenous Given     05/26/2025 0539 acetaminophen (Ofirmev) injection 1,000 mg 1,000 mg Intravenous New Bag     05/26/2025 0708 morphine injection 4 mg 4 mg Intravenous Given            Scheduled Medications:  acetaminophen, 975 mg, Oral, Q8H ABDULKADIR  aspirin, 81 mg, Oral, Daily  atorvastatin, 80 mg, Oral, Daily With Dinner  DULoxetine, 60 mg, Oral, Daily  Fluticasone Furoate-Vilanterol, 1 puff, Inhalation, Daily  gabapentin, 100 mg, Oral, TID  insulin lispro, 1-5 Units, Subcutaneous, HS  insulin lispro, 1-6 Units, Subcutaneous, TID AC  ketorolac, 15 mg, Intravenous, Q6H  ABDULKADIR  lisinopril, 20 mg, Oral, Daily  magnesium Oxide, 400 mg, Oral, BID  methocarbamol, 750 mg, Oral, Q6H ABDULKADIR  nicotine, 1 patch, Transdermal, Daily  prazosin, 1 mg, Oral, HS  rivaroxaban, 20 mg, Oral, Daily With Breakfast  insulin glargine (LANTUS) subcutaneous injection 25 Units 0.25 mL  Dose: 25 Units  Freq: Every 12 hours scheduled Route: SC  Start: 05/26/25 1115 End: 05/28/25 0710  insulin glargine (LANTUS) subcutaneous injection 30 Units 0.3 mL  Dose: 30 Units  Freq: Every 12 hours scheduled Route: SC  Start: 05/28/25 0900  morphine injection 4 mg  Dose: 4 mg  Freq: Once Route: IV  Start: 05/28/25 0315 End: 05/28/25 0315    Continuous IV Infusions: NONE      PRN Meds:  albuterol, 1 puff, Inhalation, Q4H PRN  Artificial Tears, 2 drop, Both Eyes, Q6H PRN  hydrALAZINE, 5 mg, Intravenous, Q6H PRN  hydrOXYzine HCL, 25 mg, Oral, BID PRN  morphine injection, 4 mg, Intravenous, Q4H PRN- given x2 5/26, x3 5/27 and x1 5/28  oxyCODONE, 2.5 mg, Oral, Q6H PRN- given x1 5/28  oxyCODONE, 5 mg, Oral, Q6H PRN- given x2 5/26, x3 5/27      ED Triage Vitals   Temperature Pulse Respirations Blood Pressure SpO2 Pain Score   05/26/25 0246 05/26/25 0245 05/26/25 0245 05/26/25 0245 05/26/25 0245 05/26/25 0245   97.8 °F (36.6 °C) 86 18 165/98 99 % 10 - Worst Possible Pain     Weight (last 2 days)       Date/Time Weight    05/27/25 1600 99.8 (220.02)    05/26/25 0929 99.8 (220)            Vital Signs (last 3 days)       Date/Time Temp Pulse Resp BP MAP (mmHg) SpO2 O2 Device Patient Position - Orthostatic VS Bianca Coma Scale Score Pain    05/28/25 1245 -- -- -- -- -- -- -- -- -- 10 - Worst Possible Pain    05/28/25 1202 -- -- -- -- -- -- -- -- -- 8    05/28/25 1018 -- 76 16 126/68 -- 98 % None (Room air) -- -- --    05/28/25 0904 -- -- -- -- -- -- -- -- 15 10 - Worst Possible Pain    05/28/25 0657 98.5 °F (36.9 °C) 86 16 164/94 -- 100 % None (Room air) Lying -- --    05/28/25 0502 98.3 °F (36.8 °C) 80 18 127/83 104 -- -- Lying -- 10 -  Worst Possible Pain    05/28/25 0315 -- -- -- -- -- -- -- -- -- 10 - Worst Possible Pain    05/28/25 0023 -- -- -- -- -- -- -- -- -- 8    05/27/25 2243 97.8 °F (36.6 °C) 93 18 126/73 96 95 % None (Room air) Lying -- --    05/27/25 2143 -- -- -- -- -- -- -- -- -- 10 - Worst Possible Pain    05/27/25 2102 -- -- -- -- -- -- -- -- -- 10 - Worst Possible Pain    05/27/25 2039 -- -- -- -- -- -- -- -- 15 --    05/27/25 2034 -- -- -- -- -- -- -- -- -- 10 - Worst Possible Pain    05/27/25 1925 98.4 °F (36.9 °C) 62 16 160/91 -- 100 % None (Room air) -- -- --    05/27/25 1827 -- -- -- -- -- -- -- -- -- 8 05/27/25 1630 -- -- -- -- -- -- -- -- -- 10 - Worst Possible Pain    05/27/25 1600 -- 83 -- 194/96 -- -- -- -- -- --    05/27/25 1525 97.8 °F (36.6 °C) 83 17 194/96 -- 98 % None (Room air) Lying -- --    05/27/25 1323 -- -- -- -- -- -- -- -- -- 8 05/27/25 1322 -- -- -- -- -- -- -- -- -- 9    05/27/25 1235 97.8 °F (36.6 °C) 71 19 173/112 -- 99 % -- Lying -- --    05/27/25 1126 -- -- -- -- -- -- -- -- -- 8    05/27/25 0820 -- -- -- -- -- -- -- -- 15 --    05/27/25 0747 -- -- -- 161/81 -- -- -- Lying -- --    05/27/25 0745 98.1 °F (36.7 °C) 81 18 192/108 -- 100 % -- Lying -- --    05/27/25 0606 -- -- -- -- -- -- -- -- -- 8    05/27/25 0513 -- -- -- -- -- -- -- -- -- 10 - Worst Possible Pain    05/27/25 0400 98 °F (36.7 °C) 77 17 188/102 -- 97 % -- -- 15 --    05/27/25 0302 -- -- -- -- -- -- -- -- -- 8    05/27/25 0030 98 °F (36.7 °C) 87 16 151/99 -- -- -- Lying 15 8    05/26/25 2340 -- -- -- -- -- 97 % None (Room air) -- -- --    05/26/25 2157 -- -- -- -- -- -- -- -- -- 6    05/26/25 2030 97.8 °F (36.6 °C) 86 16 171/102 -- -- -- Lying 15 --    05/26/25 2014 -- -- -- -- -- -- -- -- -- 9    05/26/25 1830 97.5 °F (36.4 °C) 84 16 177/103 134 -- -- Lying -- --    05/26/25 1705 -- -- -- -- -- -- -- -- -- 10 - Worst Possible Pain    05/26/25 1611 98.1 °F (36.7 °C) 80 16 169/111 -- 96 % None (Room air) Lying -- --    05/26/25 1437  97.6 °F (36.4 °C) -- -- -- -- -- -- -- -- --    05/26/25 1435 -- 82 16 142/90 -- 100 % -- Lying -- 10 - Worst Possible Pain    05/26/25 1400 97.6 °F (36.4 °C) 82 16 142/90 -- 100 % -- -- -- --    05/26/25 1330 97.9 °F (36.6 °C) 85 20 155/109 -- 100 % -- Lying -- --    05/26/25 1230 97.8 °F (36.6 °C) 1 16 158/88 -- 100 % None (Room air) Sitting -- --    05/26/25 1142 -- -- -- -- -- -- -- -- -- 10 - Worst Possible Pain    05/26/25 1130 98.3 °F (36.8 °C) 89 20 191/111 -- 100 % None (Room air) Sitting -- --    05/26/25 1102 -- -- -- -- -- 100 % None (Room air) -- -- --    05/26/25 1030 98 °F (36.7 °C) 81 18 164/109 -- -- -- Lying -- --    05/26/25 1000 -- -- -- -- -- -- -- -- -- 10 - Worst Possible Pain    05/26/25 0940 -- -- -- -- -- -- -- -- -- 10 - Worst Possible Pain    05/26/25 0930 -- -- -- 206/115 -- -- -- Sitting 15 --    05/26/25 0929 98.1 °F (36.7 °C) 80 20 202/117 -- 97 % None (Room air) Sitting -- --    05/26/25 0715 -- 89 18 172/101 131 99 % None (Room air) Lying -- --    05/26/25 0708 -- -- -- -- -- -- -- -- -- 10 - Worst Possible Pain    05/26/25 0600 -- 80 18 181/88 127 98 % None (Room air) Lying -- --    05/26/25 0530 -- 80 18 183/102 136 96 % None (Room air) Lying -- --    05/26/25 0457 -- -- -- -- -- -- -- -- -- 10 - Worst Possible Pain    05/26/25 0430 -- 76 19 139/88 103 98 % None (Room air) Lying -- --    05/26/25 0400 -- 84 19 137/78 102 99 % None (Room air) Lying -- --    05/26/25 0300 -- 81 18 130/94 101 99 % None (Room air) Lying 15 10 - Worst Possible Pain    05/26/25 0246 97.8 °F (36.6 °C) -- -- -- -- -- -- -- -- --    05/26/25 0245 -- 86 18 165/98 123 99 % None (Room air) Lying -- 10 - Worst Possible Pain              Pertinent Labs/Diagnostic Test Results:   Radiology:  MRI brain wo contrast   Final Interpretation by Celestine Ley MD (05/27 0829)      1.  No acute infarction.   2.  Chronic microangiopathic changes and multifocal chronic infarctions as above, advanced for age.       Workstation performed: [id number]         XR ankle 2 vw right   Final Interpretation by Tawny Hand MD (05/27 8255)   No acute osseous abnormality.   Chronic findings, as above.         Computerized Assisted Algorithm (CAA) may have been used to analyze all applicable images.               Workstation performed: [id number]         XR foot 2 vw right   Final Interpretation by Tawny Hand MD (05/27 2615)   No acute osseous abnormality.   Chronic findings, as above.         Computerized Assisted Algorithm (CAA) may have been used to analyze all applicable images.               Workstation performed: [id number]         CTA head and neck with and without contrast   Final Interpretation by Mati Heath MD (05/26 6512)      CT Brain: No acute intracranial hemorrhage. Sequelae of old infarcts in the left thalamus, left caudate, and left gangliocapsular region again seen. If clinical concern for acute ischemia, consider more sensitive MRI brain for better evaluation.    Opacification of the right mastoid air cells with soft tissue density material in the right middle ear cavity noted which could suggest sequelae of otomastoiditis. Recommend correlation with ENT exam findings.      CT Angiography: Unremarkable CTA neck and brain without large vessel arterial occlusion, significant flow-limiting stenosis, or focal saccular aneurysm. No enhancing brain mass/fluid collection or evidence of vascular malformation. No enhancing soft    tissue neck mass/fluid collection or cervical lymphadenopathy.               Workstation performed: [id number]         IR lower extremity angiogram    (Results Pending)   XR wrist 3+ vw left    (Results Pending)   XR forearm 2 vw left    (Results Pending)   XR hand 3+ vw left    (Results Pending)     Cardiology:  Echo complete w/ contrast if indicated   Final Result by Apolinar Novak MD (05/27 4479)        Left Ventricle: Left ventricular cavity size is normal. Wall thickness     is increased. There is mild concentric hypertrophy. The left ventricular    ejection fraction is 60%. Systolic function is normal. Wall motion is    normal. Diastolic function is normal.     Atrial Septum: No patent foramen ovale detected at rest using color    doppler and using agitated saline contrast, or by provocation with cough    and valsalva using agitated saline contrast.     Mitral Valve: There is mild regurgitation.     Prior TTE study available for comparison. Prior study date: 9/10/2021.    No significant changes noted compared to the prior study.      No diagnostic echocardiographic evidence of intracardiac thrombus or    intracardiac shunt.     Due to technical limitations of transthoracic echocardiogram, this    possibility cannot be completely excluded on the basis of this study.  If    continued clinical suspicion for cardioembolic etiology persists, can    consider further evaluation with transesophageal echocardiogram for more    definitive assessment.         ECG 12 lead   Final Result by Apolinar Novak MD (05/27 0656)   Normal sinus rhythm   Normal ECG   When compared with ECG of 15-May-2025 02:46,   No significant change was found   Confirmed by Apolinar Novak ([id number]) on 5/27/2025 6:57:26 PM            Results from last 7 days   Lab Units 05/28/25  0521 05/27/25  0532 05/26/25  0321   WBC Thousand/uL 8.87 9.48 10.89*   HEMOGLOBIN g/dL 13.4 13.4 13.4   HEMATOCRIT % 39.3 37.9 37.6   PLATELETS Thousands/uL 311 379 393*   TOTAL NEUT ABS Thousands/µL 4.89 5.12 6.78         Results from last 7 days   Lab Units 05/28/25  0521 05/27/25  0532 05/26/25  0321   SODIUM mmol/L 135 134* 132*   POTASSIUM mmol/L 3.9 3.5 3.7   CHLORIDE mmol/L 101 99 100   CO2 mmol/L 28 27 24   ANION GAP mmol/L 6 8 8   BUN mg/dL 17 13 14   CREATININE mg/dL 0.52* 0.41* 0.53*   EGFR ml/min/1.73sq m 121 131 120   CALCIUM mg/dL 8.2* 8.5 8.4   MAGNESIUM mg/dL 2.0 1.8* 1.9     Results from last 7 days   Lab Units  05/27/25  0532 05/26/25  0321   AST U/L 10* 11*   ALT U/L 15 16   ALK PHOS U/L 108* 126*   TOTAL PROTEIN g/dL 6.5 6.6   ALBUMIN g/dL 3.5 3.6   TOTAL BILIRUBIN mg/dL 0.25 0.23     Results from last 7 days   Lab Units 05/28/25  1131 05/28/25  0703 05/27/25 2003 05/27/25  1621 05/27/25  1125 05/27/25  0717 05/26/25 2019 05/26/25  1609 05/26/25  1100 05/26/25  1011 05/26/25  0247   POC GLUCOSE mg/dl 222* 242* 346* 175* 208* 182* 222* 362* 394* 393* 354*     Results from last 7 days   Lab Units 05/28/25  0521 05/27/25  0532 05/26/25  0321   GLUCOSE RANDOM mg/dL 235* 205* 341*     Results from last 7 days   Lab Units 05/26/25  0321   HEMOGLOBIN A1C % 13.3*   EAG mg/dl 335     BETA-HYDROXYBUTYRATE   Date Value Ref Range Status   08/24/2023 1.1 (H) <0.6 mmol/L Final   05/15/2019 0.3 <0.6 mmol/L Final        Results from last 7 days   Lab Units 05/26/25  0526 05/26/25  0321   HS TNI 0HR ng/L  --  6   HS TNI 2HR ng/L 6  --    HSTNI D2 ng/L 0  --          Results from last 7 days   Lab Units 05/28/25  0521   PROTIME seconds 14.4   INR  1.08     Results from last 7 days   Lab Units 05/26/25  0526   TSH 3RD GENERATON uIU/mL 1.743         Results from last 7 days   Lab Units 05/26/25  0321   LACTIC ACID mmol/L 1.5       Results from last 7 days   Lab Units 05/26/25  1615   CLARITY UA  Clear   COLOR UA  Yellow   SPEC GRAV UA  1.010   PH UA  6.0   GLUCOSE UA mg/dl 3+*   KETONES UA mg/dl Negative   BLOOD UA  2+*   PROTEIN UA mg/dl Negative   NITRITE UA  Negative   BILIRUBIN UA  Negative   UROBILINOGEN UA E.U./dl 0.2   LEUKOCYTES UA  Negative   WBC UA /hpf 2-4   RBC UA /hpf 1-2   BACTERIA UA /hpf Occasional   EPITHELIAL CELLS WET PREP /hpf Occasional         Results from last 7 days   Lab Units 05/26/25  1615   AMPH/METH  Negative   BARBITURATE UR  Negative   BENZODIAZEPINE UR  Negative   COCAINE UR  Positive*   METHADONE URINE  Negative   OPIATE UR  Positive*   PCP UR  Negative   THC UR  Positive*     Past Medical  History[1]  Present on Admission:   Tobacco abuse   COPD (chronic obstructive pulmonary disease)/asthma   Essential hypertension   Bipolar disorder   Mixed hyperlipidemia   Peripheral arterial disease (HCC)      Admitting Diagnosis: Hand pain [M79.643]  Left hand pain [M79.642]  Stroke-like symptoms [R29.90]  Age/Sex: 39 y.o. female    Network Utilization Review Department  ATTENTION: Please call with any questions or concerns to [phone] and carefully listen to the prompts so that you are directed to the right person. All voicemails are confidential.   For Discharge needs, contact Care Management DC Support Team at [phone] opt. 2  Send all requests for admission clinical reviews, approved or denied determinations and any other requests to dedicated fax number below belonging to the campus where the patient is receiving treatment. List of dedicated fax numbers for the Facilities:  FACILITY NAME UR FAX NUMBER   ADMISSION DENIALS (Administrative/Medical Necessity) [phone]   DISCHARGE SUPPORT TEAM (NETWORK) [phone]   PARENT CHILD HEALTH (Maternity/NICU/Pediatrics) [phone]   [facility] [phone]   [facility] [phone]   [facility] [phone]   [facility] [phone]   [facility] [phone]   [facility] [phone]   [facility] [phone]   [facility] [phone]   [facility] [phone]   On license of [facility] [phone]   [facility] [phone]   [facility] [phone]              [1]   Past Medical History:  Diagnosis Date    Asthma     Atherosclerosis     Bipolar 1 disorder (HCC)     COPD (chronic obstructive pulmonary disease) (HCC)      Depression     Diabetes mellitus (HCC)     Hypertension     Psychiatric disorder     cutting history    PTSD (post-traumatic stress disorder)     Schizoaffective disorder (HCC)     Tendonitis

## 2025-05-27 NOTE — CONSULTS
e-Consult (IPC) - Neurology   Name: Tony Roberto 39 y.o. female I MRN: [id number]  Unit/Bed#: -01 I Date of Admission: 5/26/2025   Date of Service: 5/27/2025 I Hospital Day: 0   Inpatient consult to Neurology  Consult performed by: Nick Boothe MD  Consult ordered by: Shanthi Lane PA-C        Physician Requesting Evaluation: Mila Mirza DO   Reason for Evaluation / Principal Problem: eval for acute cva    ASSESSMENT:  Possible new onset compressive neuropathy given the distal lue numbness/weakness/pain from elbow done to hand superimposed on numbness bilateral 5th digit for the past month may be ulnar neuropathies. Video evaluation would not be very helpful in teasing out the exact etiology.  MRI brain w/o contrast is showing no acute strokes however multiple chronic strokes and significant white matter disease. Symptoms not consistent with a cervical radiculopathy or myelopathy.    RECOMMENDATIONS:  Consider orthopedics consult if symptoms not improving  Outpt neuromuscular evaluation by attending in 4-6 weeks. May need emg/ncs.  Consider gabapentin 100 mg po tid for a couple days and see if it helps  Vascular risk factors hba1c 13.3 and ldl over 160 still very out of range and need drastic improvement. She is high risk for future strokes. Continue secondary stroke prevention.  Outpt neurocognitive assessment.     21-30 minutes, >50% of the total time devoted to medical consultative verbal/EMR discussion between providers. Written report will be generated in the EMR.

## 2025-05-27 NOTE — ASSESSMENT & PLAN NOTE
History of PAD, known CLTI s/p left fem-BK pop bypass which subsequently occluded; s/p L AKA for nonsalvageable LLE in 2023  Last LEAD (3/21/25) revealed diffuse disease, significant decrease in right NIKKI now in severe range at 0.57  Vascular surgery consult while inpatient, patient with ongoing intermittent RLE claudication pain   Wound on right lateral ankle and right first metatarsal base, check XR right foot/ankle though doubt infection  Vascular surgery consulted, could consider inpatient vs outpatient angiogram for further evaluation

## 2025-05-27 NOTE — ASSESSMENT & PLAN NOTE
Lab Results   Component Value Date    HGBA1C 13.3 (H) 05/26/2025     Recent Labs     05/26/25  1609 05/26/25  2019 05/27/25  0717 05/27/25  1125   POCGLU 362* 222* 182* 208*     Blood Sugar Average: Last 72 hrs:  (P) 302.6167586750783916    Poorly controlled T2DM per most recent HgbA1c 11. Patient admits to noncompliance with insulin/medications, notes difficulty with affording and running out of supplies for glucometer & recently applied for Medicaid.  Updated HgbA1c 13.3, very poorly controlled T2DM.  Prior home regimen: Lantus 30U BID, Humalog SSI 4-16U TID, Trulicity weekly injections.   Resumed Lantus at 25U BID, likely will need prescription & price check for diabetic supplies   SSI coverage with Accu-Cheks ACHS  Carb controlled diet, hypoglycemia protocol

## 2025-05-27 NOTE — ASSESSMENT & PLAN NOTE
Hypertensive urgency on admission, BP up to 200/110s  Reports noncompliance with home antihypertensives  BP reviewed, still hypertensive with -190s  Restart home lisinopril 20 mg daily, prazosin 1 mg qHS

## 2025-05-27 NOTE — PLAN OF CARE
Problem: PAIN - ADULT  Goal: Verbalizes/displays adequate comfort level or baseline comfort level  Description: Interventions:  - Encourage patient to monitor pain and request assistance  - Assess pain using appropriate pain scale  - Administer analgesics as ordered based on type and severity of pain and evaluate response  - Implement non-pharmacological measures as appropriate and evaluate response  - Consider cultural and social influences on pain and pain management  - Notify physician/advanced practitioner if interventions unsuccessful or patient reports new pain  - Educate patient/family on pain management process including their role and importance of  reporting pain   - Provide non-pharmacologic/complimentary pain relief interventions  Outcome: Progressing     Problem: INFECTION - ADULT  Goal: Absence or prevention of progression during hospitalization  Description: INTERVENTIONS:  - Assess and monitor for signs and symptoms of infection  - Monitor lab/diagnostic results  - Monitor all insertion sites, i.e. indwelling lines, tubes, and drains  - Monitor endotracheal if appropriate and nasal secretions for changes in amount and color  - [place] appropriate cooling/warming therapies per order  - Administer medications as ordered  - Instruct and encourage patient and family to use good hand hygiene technique  - Identify and instruct in appropriate isolation precautions for identified infection/condition  Outcome: Progressing     Problem: INFECTION - ADULT  Goal: Absence of fever/infection during neutropenic period  Description: INTERVENTIONS:  - Monitor WBC  - Perform strict hand hygiene  - Limit to healthy visitors only  - No plants, dried, fresh or silk flowers with argueta in patient room  Outcome: Progressing     Problem: SAFETY ADULT  Goal: Patient will remain free of falls  Description: INTERVENTIONS:  - Educate patient/family on patient safety including physical limitations  - Instruct patient to call  for assistance with activity   - Consider consulting OT/PT to assist with strengthening/mobility based on AM PAC & JH-HLM score  - Consult OT/PT to assist with strengthening/mobility   - Keep Call bell within reach  - Keep bed low and locked with side rails adjusted as appropriate  - Keep care items and personal belongings within reach  - Initiate and maintain comfort rounds  - Make Fall Risk Sign visible to staff  - Offer Toileting every 2 Hours, in advance of need  - Initiate/Maintain Bed alarm  - Obtain necessary fall risk management equipment:   - Apply yellow socks and bracelet for high fall risk patients  - Consider moving patient to room near nurses station  Outcome: Progressing

## 2025-05-27 NOTE — PROGRESS NOTES
Progress Note - Hospitalist   Name: Tony Roberto 39 y.o. female I MRN: [id number]  Unit/Bed#: -01 I Date of Admission: 5/26/2025   Date of Service: 5/27/2025 I Hospital Day: 0    Assessment & Plan  Left hand pain  Patient presented with distal left arm pain/numbness/weakness since 2 AM on 5/26. Patient reports having numbness on bilateral 5th fingers for the past month, however weakness & pain from left elbow down to left hand is new.  Initial concern for stroke given prior hx. not a candidate for TNK, reports taking her Xarelto at around 2:30 AM PTA.  CTA head/neck: No acute intracranial hemorrhage, sequela of old infarction left thalamus, left caudate and left ganglial capsular region. No large vessel arterial occlusion, significant stenosis or focal aneurysm.  MRI brain showed no evidence of acute infarction  Received aspirin 325 mg in ED. Restarted baby ASA, Xarelto & atorvastatin at higher dose.  Neurology signed off, symptoms likely due to neuropathy.  Pain management with IV Toradol, Tylenol, Robaxin, PRN oxycodone and IV morphine.  Trial gabapentin, if no improvement consider orthopedic consult and/or neuromuscular evaluation in 4-6 weeks  Type 2 diabetes mellitus with diabetic polyneuropathy, with long-term current use of insulin ([facility])  Lab Results   Component Value Date    HGBA1C 13.3 (H) 05/26/2025     Recent Labs     05/26/25  1609 05/26/25  2019 05/27/25  0717 05/27/25  1125   POCGLU 362* 222* 182* 208*     Blood Sugar Average: Last 72 hrs:  (P) 302.0420606513171754    Poorly controlled T2DM per most recent HgbA1c 11. Patient admits to noncompliance with insulin/medications, notes difficulty with affording and running out of supplies for glucometer & recently applied for Medicaid.  Updated HgbA1c 13.3, very poorly controlled T2DM.  Prior home regimen: Lantus 30U BID, Humalog SSI 4-16U TID, Trulicity weekly injections.   Resumed Lantus at 25U BID, likely will need prescription & price check  for diabetic supplies   SSI coverage with Accu-Cheks ACHS  Carb controlled diet, hypoglycemia protocol  History of CVA (cerebrovascular accident)  History of prior strokes, initially found to have evidence of chronic infarcts on CT head in December 2024  Suspect possibly related to medication noncompliance, poorly controlled HTN and T2DM  MRI brain this admission with no acute stroke  Updated lipid panel: , , HDL 50, .  Resumed home Xarelto, baby aspirin  Increase atorvastatin 40->80 mg daily  Check updated Echo for completeness  Peripheral arterial disease (HCC)  History of PAD, known CLTI s/p left fem-BK pop bypass which subsequently occluded; s/p L AKA for nonsalvageable LLE in 2023  Last LEAD (3/21/25) revealed diffuse disease, significant decrease in right NIKKI now in severe range at 0.57  Vascular surgery consult while inpatient, patient with ongoing intermittent RLE claudication pain   Wound on right lateral ankle and right first metatarsal base, check XR right foot/ankle though doubt infection  Vascular surgery consulted, could consider inpatient vs outpatient angiogram for further evaluation  Essential hypertension  Hypertensive urgency on admission, BP up to 200/110s  Reports noncompliance with home antihypertensives  BP reviewed, still hypertensive with -190s  Restart home lisinopril 20 mg daily, prazosin 1 mg qHS  History of substance use  History of cocaine use, patient admits to smoking crack cocaine 4 days PTA & marijuana 2 days PTA  UDS: + Cocaine and THC. No signs of withdrawal.  Strongly encouraged cessation of substance use  Class 3 severe obesity due to excess calories without serious comorbidity with body mass index (BMI) of 45.0 to 49.9 in adult  BMI 41.57, affects all aspects of care  Strongly encourage dietary/lifestyle modifications  COPD (chronic obstructive pulmonary disease)/asthma  Not in acute exacerbation, currently stable on room air  Resume daily maintenance  inhaler, PRN albuterol  S/P AKA (above knee amputation) unilateral, left (HCC)  S/p left AKA on 3/29/2023, wheelchair-bound at baseline  Tobacco abuse  Nicotine patches, smoking cessation education    VTE Pharmacologic Prophylaxis: VTE Score: 3 Moderate Risk (Score 3-4) - Pharmacological DVT Prophylaxis Ordered: rivaroxaban (Xarelto).    Mobility:   Basic Mobility Inpatient Raw Score: 24  JH-HLM Goal: 8: Walk 250 feet or more  JH-HLM Achieved: 5: Stand (1 or more minutes)  JH-HLM Goal NOT achieved. Continue with multidisciplinary rounding and encourage appropriate mobility to improve upon JH-HLM goals.    Patient Centered Rounds: I performed bedside rounds with nursing staff today.   Discussions with Specialists or Other Care Team Provider: Case management    Education and Discussions with Family / Patient: Updated  (wife) at bedside.    Current Length of Stay: 0 day(s)  Current Patient Status: Observation   Certification Statement: The patient will continue to require additional inpatient hospital stay due to pain management, HTN, glycemic control  Discharge Plan: Anticipate discharge tomorrow to home.    Code Status: Level 1 - Full Code    Subjective   Patient is seen at bedside this a.m., reports ongoing pain in left hand/wrist currently 8/10 though slightly improved, denies any other acute neurological symptoms otherwise.     Objective :  Temp:  [97.5 °F (36.4 °C)-98.1 °F (36.7 °C)] 98.1 °F (36.7 °C)  HR:  [1-87] 81  BP: (142-192)/() 161/81  Resp:  [16-20] 18  SpO2:  [96 %-100 %] 100 %  O2 Device: None (Room air)    Body mass index is 41.57 kg/m².     Input and Output Summary (last 24 hours):     Intake/Output Summary (Last 24 hours) at 5/27/2025 1137  Last data filed at 5/26/2025 2330  Gross per 24 hour   Intake 250 ml   Output 300 ml   Net -50 ml       Physical Exam  Constitutional:       General: She is not in acute distress.     Appearance: She is obese. She is not ill-appearing,  toxic-appearing or diaphoretic.     Cardiovascular:      Rate and Rhythm: Normal rate and regular rhythm.      Pulses: Normal pulses.      Heart sounds: Normal heart sounds.   Pulmonary:      Effort: Pulmonary effort is normal. No respiratory distress.      Breath sounds: Normal breath sounds.   Abdominal:      General: There is no distension.      Palpations: Abdomen is soft.      Tenderness: There is no abdominal tenderness.     Musculoskeletal:         General: No swelling or tenderness.      Comments: S/p left AKA. ROM of left hand/wrist improved compared to 5/26, slightly limited by pain but able to make a fist.     Skin:     General: Skin is warm.     Neurological:      General: No focal deficit present.      Mental Status: She is alert. Mental status is at baseline.     Psychiatric:         Mood and Affect: Mood normal.         Behavior: Behavior normal.           Lines/Drains:              Lab Results: I have reviewed the following results:   Results from last 7 days   Lab Units 05/27/25  0532   WBC Thousand/uL 9.48   HEMOGLOBIN g/dL 13.4   HEMATOCRIT % 37.9   PLATELETS Thousands/uL 379   SEGS PCT % 54   LYMPHO PCT % 36   MONO PCT % 5   EOS PCT % 4     Results from last 7 days   Lab Units 05/27/25  0532   SODIUM mmol/L 134*   POTASSIUM mmol/L 3.5   CHLORIDE mmol/L 99   CO2 mmol/L 27   BUN mg/dL 13   CREATININE mg/dL 0.41*   ANION GAP mmol/L 8   CALCIUM mg/dL 8.5   ALBUMIN g/dL 3.5   TOTAL BILIRUBIN mg/dL 0.25   ALK PHOS U/L 108*   ALT U/L 15   AST U/L 10*   GLUCOSE RANDOM mg/dL 205*         Results from last 7 days   Lab Units 05/27/25  1125 05/27/25  0717 05/26/25  2019 05/26/25  1609 05/26/25  1100 05/26/25  1011 05/26/25  0247   POC GLUCOSE mg/dl 208* 182* 222* 362* 394* 393* 354*     Results from last 7 days   Lab Units 05/26/25  0321   HEMOGLOBIN A1C % 13.3*     Results from last 7 days   Lab Units 05/26/25  0321   LACTIC ACID mmol/L 1.5       Recent Cultures (last 7 days):         Imaging Results  Review: I reviewed radiology reports from this admission including: MRI brain.  Other Study Results Review: No additional pertinent studies reviewed.    Last 24 Hours Medication List:     Current Facility-Administered Medications:     acetaminophen (TYLENOL) tablet 975 mg, Q8H ABDULKADIR    albuterol (PROVENTIL HFA,VENTOLIN HFA) inhaler 1 puff, Q4H PRN    Artificial Tears Op Soln 2 drop, Q6H PRN    aspirin chewable tablet 81 mg, Daily    atorvastatin (LIPITOR) tablet 80 mg, Daily With Dinner    DULoxetine (CYMBALTA) delayed release capsule 60 mg, Daily    Fluticasone Furoate-Vilanterol 100-25 mcg/actuation 1 puff, Daily    gabapentin (NEURONTIN) capsule 100 mg, TID    hydrOXYzine HCL (ATARAX) tablet 25 mg, BID PRN    insulin glargine (LANTUS) subcutaneous injection 25 Units 0.25 mL, Q12H ABDULKADIR    insulin lispro (HumALOG/ADMELOG) 100 units/mL subcutaneous injection 1-5 Units, HS    insulin lispro (HumALOG/ADMELOG) 100 units/mL subcutaneous injection 1-6 Units, TID AC **AND** Fingerstick Glucose (POCT), TID AC    ketorolac (TORADOL) injection 15 mg, Q6H ABDULKADIR    lisinopril (ZESTRIL) tablet 20 mg, Daily    magnesium Oxide (MAG-OX) tablet 400 mg, BID    methocarbamol (ROBAXIN) tablet 750 mg, Q6H ABDULKADIR    morphine injection 4 mg, Q4H PRN    nicotine (NICODERM CQ) 21 mg/24 hr TD 24 hr patch 1 patch, Daily    oxyCODONE (ROXICODONE) IR tablet 2.5 mg, Q6H PRN    oxyCODONE (ROXICODONE) IR tablet 5 mg, Q6H PRN    prazosin (MINIPRESS) capsule 1 mg, HS    rivaroxaban (XARELTO) tablet 20 mg, Daily With Breakfast    Administrative Statements   Today, Patient Was Seen By: Shanthi Lane PA-C  I have spent a total time of 45 minutes in caring for this patient on the day of the visit/encounter including Patient and family education, Documenting in the medical record, Reviewing/placing orders in the medical record (including tests, medications, and/or procedures), Obtaining or reviewing history  , and Communicating with other healthcare  professionals .    **Please Note: This note may have been constructed using a voice recognition system.**

## 2025-05-27 NOTE — ASSESSMENT & PLAN NOTE
Patient presented with distal left arm pain/numbness/weakness since 2 AM on 5/26. Patient reports having numbness on bilateral 5th fingers for the past month, however weakness & pain from left elbow down to left hand is new.  Initial concern for stroke given prior hx. not a candidate for TNK, reports taking her Xarelto at around 2:30 AM PTA.  CTA head/neck: No acute intracranial hemorrhage, sequela of old infarction left thalamus, left caudate and left ganglial capsular region. No large vessel arterial occlusion, significant stenosis or focal aneurysm.  MRI brain showed no evidence of acute infarction  Received aspirin 325 mg in ED. Restarted baby ASA, Xarelto & atorvastatin at higher dose.  Neurology signed off, symptoms likely due to neuropathy.  Pain management with IV Toradol, Tylenol, Robaxin, PRN oxycodone and IV morphine.  Trial gabapentin, if no improvement consider orthopedic consult and/or neuromuscular evaluation in 4-6 weeks

## 2025-05-27 NOTE — PLAN OF CARE
Problem: PAIN - ADULT  Goal: Verbalizes/displays adequate comfort level or baseline comfort level  Description: Interventions:  - Encourage patient to monitor pain and request assistance  - Assess pain using appropriate pain scale  - Administer analgesics as ordered based on type and severity of pain and evaluate response  - Implement non-pharmacological measures as appropriate and evaluate response  - Consider cultural and social influences on pain and pain management  - Notify physician/advanced practitioner if interventions unsuccessful or patient reports new pain  - Educate patient/family on pain management process including their role and importance of  reporting pain   - Provide non-pharmacologic/complimentary pain relief interventions  Outcome: Progressing     Problem: INFECTION - ADULT  Goal: Absence or prevention of progression during hospitalization  Description: INTERVENTIONS:  - Assess and monitor for signs and symptoms of infection  - Monitor lab/diagnostic results  - Monitor all insertion sites, i.e. indwelling lines, tubes, and drains  - Monitor endotracheal if appropriate and nasal secretions for changes in amount and color  - [place] appropriate cooling/warming therapies per order  - Administer medications as ordered  - Instruct and encourage patient and family to use good hand hygiene technique  - Identify and instruct in appropriate isolation precautions for identified infection/condition  Outcome: Progressing     Problem: DISCHARGE PLANNING  Goal: Discharge to home or other facility with appropriate resources  Description: INTERVENTIONS:  - Identify barriers to discharge w/patient and caregiver  - Arrange for needed discharge resources and transportation as appropriate  - Identify discharge learning needs (meds, wound care, etc.)  - Arrange for interpretive services to assist at discharge as needed  - Refer to Case Management Department for coordinating discharge planning if the patient needs  post-hospital services based on physician/advanced practitioner order or complex needs related to functional status, cognitive ability, or social support system  Outcome: Progressing     Problem: Knowledge Deficit  Goal: Patient/family/caregiver demonstrates understanding of disease process, treatment plan, medications, and discharge instructions  Description: Complete learning assessment and assess knowledge base.  Interventions:  - Provide teaching at level of understanding  - Provide teaching via preferred learning methods  Outcome: Progressing     Problem: Neurological Deficit  Goal: Neurological status is stable or improving  Description: Interventions:  - Monitor and assess patient's level of consciousness, motor function, sensory function, and level of assistance needed for ADLs.   - Monitor and report changes from baseline. Collaborate with interdisciplinary team to initiate plan and implement interventions as ordered.   - Provide and maintain a safe environment.  - Consider seizure precautions.  - Consider fall precautions.  - Consider aspiration precautions.  - Consider bleeding precautions.  Outcome: Progressing     Problem: Activity Intolerance/Impaired Mobility  Goal: Mobility/activity is maintained at optimum level for patient  Description: Interventions:  - Assess and monitor patient  barriers to mobility and need for assistive/adaptive devices.  - Assess patient's emotional response to limitations.  - Collaborate with interdisciplinary team and initiate plans and interventions as ordered.  - Encourage independent activity per ability.  - Maintain proper body alignment.  - Perform active/passive rom as tolerated/ordered.  - Plan activities to conserve energy.  - Turn patient as appropriate  Outcome: Progressing

## 2025-05-27 NOTE — ASSESSMENT & PLAN NOTE
History of prior strokes, initially found to have evidence of chronic infarcts on CT head in December 2024  Suspect possibly related to medication noncompliance, poorly controlled HTN and T2DM  MRI brain this admission with no acute stroke  Updated lipid panel: , , HDL 50, .  Resumed home Xarelto, baby aspirin  Increase atorvastatin 40->80 mg daily  Check updated Echo for completeness

## 2025-05-27 NOTE — QUICK NOTE
Discussed with ELEN rice, patient would need 2-day washout for Xarelto prior to angiogram for PAD on RLE. Per vascular surgery, given presence of wounds on right foot/ankle and progressed NIKKI, inpatient angiogram is indicated. Next IR physician available at [facility] on Friday 5/30, patient agreeable to remain inpatient for procedure & will hold Xarelto starting tomorrow.

## 2025-05-28 ENCOUNTER — APPOINTMENT (INPATIENT)
Dept: RADIOLOGY | Facility: HOSPITAL | Age: 40
DRG: 038 | End: 2025-05-28
Payer: COMMERCIAL

## 2025-05-28 DIAGNOSIS — E11.9 DIABETES (HCC): ICD-10-CM

## 2025-05-28 PROBLEM — S91.301A WOUND OF RIGHT FOOT: Status: ACTIVE | Noted: 2025-05-28

## 2025-05-28 LAB
ANION GAP SERPL CALCULATED.3IONS-SCNC: 6 MMOL/L (ref 4–13)
BASOPHILS # BLD AUTO: 0.05 THOUSANDS/ÂΜL (ref 0–0.1)
BASOPHILS NFR BLD AUTO: 1 % (ref 0–1)
BUN SERPL-MCNC: 17 MG/DL (ref 5–25)
CALCIUM SERPL-MCNC: 8.2 MG/DL (ref 8.4–10.2)
CHLORIDE SERPL-SCNC: 101 MMOL/L (ref 96–108)
CO2 SERPL-SCNC: 28 MMOL/L (ref 21–32)
CREAT SERPL-MCNC: 0.52 MG/DL (ref 0.6–1.3)
EOSINOPHIL # BLD AUTO: 0.43 THOUSAND/ÂΜL (ref 0–0.61)
EOSINOPHIL NFR BLD AUTO: 5 % (ref 0–6)
ERYTHROCYTE [DISTWIDTH] IN BLOOD BY AUTOMATED COUNT: 15.3 % (ref 11.6–15.1)
GFR SERPL CREATININE-BSD FRML MDRD: 121 ML/MIN/1.73SQ M
GLUCOSE SERPL-MCNC: 169 MG/DL (ref 65–140)
GLUCOSE SERPL-MCNC: 222 MG/DL (ref 65–140)
GLUCOSE SERPL-MCNC: 235 MG/DL (ref 65–140)
GLUCOSE SERPL-MCNC: 242 MG/DL (ref 65–140)
GLUCOSE SERPL-MCNC: 334 MG/DL (ref 65–140)
HCT VFR BLD AUTO: 39.3 % (ref 34.8–46.1)
HGB BLD-MCNC: 13.4 G/DL (ref 11.5–15.4)
IMM GRANULOCYTES # BLD AUTO: 0.04 THOUSAND/UL (ref 0–0.2)
IMM GRANULOCYTES NFR BLD AUTO: 1 % (ref 0–2)
INR PPP: 1.08 (ref 0.85–1.19)
LYMPHOCYTES # BLD AUTO: 3 THOUSANDS/ÂΜL (ref 0.6–4.47)
LYMPHOCYTES NFR BLD AUTO: 34 % (ref 14–44)
MAGNESIUM SERPL-MCNC: 2 MG/DL (ref 1.9–2.7)
MCH RBC QN AUTO: 26.4 PG (ref 26.8–34.3)
MCHC RBC AUTO-ENTMCNC: 34.1 G/DL (ref 31.4–37.4)
MCV RBC AUTO: 77 FL (ref 82–98)
MONOCYTES # BLD AUTO: 0.46 THOUSAND/ÂΜL (ref 0.17–1.22)
MONOCYTES NFR BLD AUTO: 5 % (ref 4–12)
NEUTROPHILS # BLD AUTO: 4.89 THOUSANDS/ÂΜL (ref 1.85–7.62)
NEUTS SEG NFR BLD AUTO: 54 % (ref 43–75)
NRBC BLD AUTO-RTO: 0 /100 WBCS
PLATELET # BLD AUTO: 311 THOUSANDS/UL (ref 149–390)
PMV BLD AUTO: 11.1 FL (ref 8.9–12.7)
POTASSIUM SERPL-SCNC: 3.9 MMOL/L (ref 3.5–5.3)
PROTHROMBIN TIME: 14.4 SECONDS (ref 12.3–15)
RBC # BLD AUTO: 5.08 MILLION/UL (ref 3.81–5.12)
SODIUM SERPL-SCNC: 135 MMOL/L (ref 135–147)
WBC # BLD AUTO: 8.87 THOUSAND/UL (ref 4.31–10.16)

## 2025-05-28 PROCEDURE — 94760 N-INVAS EAR/PLS OXIMETRY 1: CPT

## 2025-05-28 PROCEDURE — 73130 X-RAY EXAM OF HAND: CPT

## 2025-05-28 PROCEDURE — 99232 SBSQ HOSP IP/OBS MODERATE 35: CPT | Performed by: PHYSICIAN ASSISTANT

## 2025-05-28 PROCEDURE — 83735 ASSAY OF MAGNESIUM: CPT | Performed by: PHYSICIAN ASSISTANT

## 2025-05-28 PROCEDURE — 73110 X-RAY EXAM OF WRIST: CPT

## 2025-05-28 PROCEDURE — 82948 REAGENT STRIP/BLOOD GLUCOSE: CPT

## 2025-05-28 PROCEDURE — 73090 X-RAY EXAM OF FOREARM: CPT

## 2025-05-28 PROCEDURE — 85610 PROTHROMBIN TIME: CPT | Performed by: PHYSICIAN ASSISTANT

## 2025-05-28 PROCEDURE — 85025 COMPLETE CBC W/AUTO DIFF WBC: CPT | Performed by: PHYSICIAN ASSISTANT

## 2025-05-28 PROCEDURE — 80048 BASIC METABOLIC PNL TOTAL CA: CPT | Performed by: PHYSICIAN ASSISTANT

## 2025-05-28 PROCEDURE — 99222 1ST HOSP IP/OBS MODERATE 55: CPT | Performed by: PODIATRIST

## 2025-05-28 PROCEDURE — 94640 AIRWAY INHALATION TREATMENT: CPT

## 2025-05-28 PROCEDURE — 99222 1ST HOSP IP/OBS MODERATE 55: CPT | Performed by: STUDENT IN AN ORGANIZED HEALTH CARE EDUCATION/TRAINING PROGRAM

## 2025-05-28 RX ORDER — METHOCARBAMOL 500 MG/1
1000 TABLET, FILM COATED ORAL EVERY 6 HOURS SCHEDULED
Status: DISCONTINUED | OUTPATIENT
Start: 2025-05-28 | End: 2025-05-28

## 2025-05-28 RX ORDER — INSULIN GLARGINE 100 [IU]/ML
30 INJECTION, SOLUTION SUBCUTANEOUS EVERY 12 HOURS SCHEDULED
Status: DISCONTINUED | OUTPATIENT
Start: 2025-05-28 | End: 2025-05-31

## 2025-05-28 RX ORDER — CYCLOBENZAPRINE HCL 10 MG
10 TABLET ORAL 3 TIMES DAILY
Status: DISCONTINUED | OUTPATIENT
Start: 2025-05-28 | End: 2025-06-02 | Stop reason: HOSPADM

## 2025-05-28 RX ORDER — MORPHINE SULFATE 4 MG/ML
4 INJECTION, SOLUTION INTRAMUSCULAR; INTRAVENOUS ONCE
Status: COMPLETED | OUTPATIENT
Start: 2025-05-28 | End: 2025-05-28

## 2025-05-28 RX ADMIN — INSULIN LISPRO 3 UNITS: 100 INJECTION, SOLUTION INTRAVENOUS; SUBCUTANEOUS at 07:59

## 2025-05-28 RX ADMIN — Medication 400 MG: at 09:05

## 2025-05-28 RX ADMIN — GABAPENTIN 100 MG: 100 CAPSULE ORAL at 21:11

## 2025-05-28 RX ADMIN — KETOROLAC TROMETHAMINE 15 MG: 30 INJECTION, SOLUTION INTRAMUSCULAR at 05:02

## 2025-05-28 RX ADMIN — FLUTICASONE FUROATE AND VILANTEROL TRIFENATATE 1 PUFF: 100; 25 POWDER RESPIRATORY (INHALATION) at 10:15

## 2025-05-28 RX ADMIN — KETOROLAC TROMETHAMINE 15 MG: 30 INJECTION, SOLUTION INTRAMUSCULAR at 17:51

## 2025-05-28 RX ADMIN — MORPHINE SULFATE 4 MG: 4 INJECTION INTRAVENOUS at 12:45

## 2025-05-28 RX ADMIN — METHOCARBAMOL TABLETS 750 MG: 750 TABLET, COATED ORAL at 12:02

## 2025-05-28 RX ADMIN — ACETAMINOPHEN 975 MG: 325 TABLET, FILM COATED ORAL at 21:11

## 2025-05-28 RX ADMIN — PRAZOSIN HYDROCHLORIDE 1 MG: 1 CAPSULE ORAL at 21:11

## 2025-05-28 RX ADMIN — INSULIN LISPRO 5 UNITS: 100 INJECTION, SOLUTION INTRAVENOUS; SUBCUTANEOUS at 16:16

## 2025-05-28 RX ADMIN — GABAPENTIN 100 MG: 100 CAPSULE ORAL at 16:17

## 2025-05-28 RX ADMIN — MORPHINE SULFATE 4 MG: 4 INJECTION INTRAVENOUS at 03:15

## 2025-05-28 RX ADMIN — OXYCODONE 2.5 MG: 5 TABLET ORAL at 09:04

## 2025-05-28 RX ADMIN — CYCLOBENZAPRINE 10 MG: 10 TABLET, FILM COATED ORAL at 21:11

## 2025-05-28 RX ADMIN — ATORVASTATIN CALCIUM 80 MG: 40 TABLET, FILM COATED ORAL at 16:16

## 2025-05-28 RX ADMIN — MORPHINE SULFATE 4 MG: 4 INJECTION INTRAVENOUS at 19:30

## 2025-05-28 RX ADMIN — INSULIN GLARGINE 30 UNITS: 100 INJECTION, SOLUTION SUBCUTANEOUS at 21:10

## 2025-05-28 RX ADMIN — GABAPENTIN 100 MG: 100 CAPSULE ORAL at 09:06

## 2025-05-28 RX ADMIN — DULOXETINE HYDROCHLORIDE 60 MG: 60 CAPSULE, DELAYED RELEASE ORAL at 09:06

## 2025-05-28 RX ADMIN — Medication 400 MG: at 17:51

## 2025-05-28 RX ADMIN — METHOCARBAMOL TABLETS 750 MG: 750 TABLET, COATED ORAL at 05:02

## 2025-05-28 RX ADMIN — CYCLOBENZAPRINE 10 MG: 10 TABLET, FILM COATED ORAL at 16:16

## 2025-05-28 RX ADMIN — INSULIN LISPRO 1 UNITS: 100 INJECTION, SOLUTION INTRAVENOUS; SUBCUTANEOUS at 21:13

## 2025-05-28 RX ADMIN — KETOROLAC TROMETHAMINE 15 MG: 30 INJECTION, SOLUTION INTRAMUSCULAR at 12:02

## 2025-05-28 RX ADMIN — INSULIN LISPRO 2 UNITS: 100 INJECTION, SOLUTION INTRAVENOUS; SUBCUTANEOUS at 12:01

## 2025-05-28 RX ADMIN — LISINOPRIL 20 MG: 20 TABLET ORAL at 09:06

## 2025-05-28 RX ADMIN — OXYCODONE 5 MG: 5 TABLET ORAL at 16:17

## 2025-05-28 RX ADMIN — HYDROXYZINE HYDROCHLORIDE 25 MG: 25 TABLET, FILM COATED ORAL at 22:19

## 2025-05-28 RX ADMIN — OXYCODONE 5 MG: 5 TABLET ORAL at 22:18

## 2025-05-28 RX ADMIN — ASPIRIN 81 MG 81 MG: 81 TABLET ORAL at 09:06

## 2025-05-28 RX ADMIN — NICOTINE 1 PATCH: 21 PATCH, EXTENDED RELEASE TRANSDERMAL at 09:16

## 2025-05-28 RX ADMIN — METHOCARBAMOL TABLETS 750 MG: 750 TABLET, COATED ORAL at 00:23

## 2025-05-28 RX ADMIN — INSULIN GLARGINE 30 UNITS: 100 INJECTION, SOLUTION SUBCUTANEOUS at 09:06

## 2025-05-28 RX ADMIN — ACETAMINOPHEN 975 MG: 325 TABLET, FILM COATED ORAL at 05:02

## 2025-05-28 RX ADMIN — KETOROLAC TROMETHAMINE 15 MG: 30 INJECTION, SOLUTION INTRAMUSCULAR at 00:23

## 2025-05-28 NOTE — PLAN OF CARE
Problem: Neurological Deficit  Goal: Neurological status is stable or improving  Description: Interventions:  - Monitor and assess patient's level of consciousness, motor function, sensory function, and level of assistance needed for ADLs.   - Monitor and report changes from baseline. Collaborate with interdisciplinary team to initiate plan and implement interventions as ordered.   - Provide and maintain a safe environment.  - Consider seizure precautions.  - Consider fall precautions.  - Consider aspiration precautions.  - Consider bleeding precautions.  Outcome: Progressing     Problem: PAIN - ADULT  Goal: Verbalizes/displays adequate comfort level or baseline comfort level  Description: Interventions:  - Encourage patient to monitor pain and request assistance  - Assess pain using appropriate pain scale  - Administer analgesics as ordered based on type and severity of pain and evaluate response  - Implement non-pharmacological measures as appropriate and evaluate response  - Consider cultural and social influences on pain and pain management  - Notify physician/advanced practitioner if interventions unsuccessful or patient reports new pain  - Educate patient/family on pain management process including their role and importance of  reporting pain   - Provide non-pharmacologic/complimentary pain relief interventions  Outcome: Not Progressing

## 2025-05-28 NOTE — ASSESSMENT & PLAN NOTE
History of PAD, known CLTI s/p left fem-BK pop bypass which subsequently occluded; s/p L AKA for nonsalvageable LLE in 2023  Last LEAD (3/21/25) revealed diffuse disease, significant decrease in right NIKKI now in severe range at 0.57  Vascular surgery consult while inpatient, patient with ongoing intermittent RLE claudication pain   Vascular surgery consulted, and for [place] [facility] to [facility] for angiogram Friday 5/30

## 2025-05-28 NOTE — ASSESSMENT & PLAN NOTE
Lab Results   Component Value Date    HGBA1C 13.3 (H) 05/26/2025       Recent Labs     05/27/25  2003 05/28/25  0703 05/28/25  1131 05/28/25  1614   POCGLU 346* 242* 222* 334*       Blood Sugar Average: Last 72 hrs:  (P) [id number]

## 2025-05-28 NOTE — PLAN OF CARE
Problem: PAIN - ADULT  Goal: Verbalizes/displays adequate comfort level or baseline comfort level  Description: Interventions:  - Encourage patient to monitor pain and request assistance  - Assess pain using appropriate pain scale  - Administer analgesics as ordered based on type and severity of pain and evaluate response  - Implement non-pharmacological measures as appropriate and evaluate response  - Consider cultural and social influences on pain and pain management  - Notify physician/advanced practitioner if interventions unsuccessful or patient reports new pain  - Educate patient/family on pain management process including their role and importance of  reporting pain   - Provide non-pharmacologic/complimentary pain relief interventions  Outcome: Progressing     Problem: INFECTION - ADULT  Goal: Absence or prevention of progression during hospitalization  Description: INTERVENTIONS:  - Assess and monitor for signs and symptoms of infection  - Monitor lab/diagnostic results  - Monitor all insertion sites, i.e. indwelling lines, tubes, and drains  - Monitor endotracheal if appropriate and nasal secretions for changes in amount and color  - [place] appropriate cooling/warming therapies per order  - Administer medications as ordered  - Instruct and encourage patient and family to use good hand hygiene technique  - Identify and instruct in appropriate isolation precautions for identified infection/condition  Outcome: Progressing     Problem: SAFETY ADULT  Goal: Patient will remain free of falls  Description: INTERVENTIONS:  - Educate patient/family on patient safety including physical limitations  - Instruct patient to call for assistance with activity   - Consider consulting OT/PT to assist with strengthening/mobility based on AM PAC & JH-HLM score  - Consult OT/PT to assist with strengthening/mobility   - Keep Call bell within reach  - Keep bed low and locked with side rails adjusted as appropriate  - Keep  care items and personal belongings within reach  - Initiate and maintain comfort rounds  - Make Fall Risk Sign visible to staff  - Offer Toileting every 2 Hours, in advance of need  - Initiate/Maintain bed alarm  - Obtain necessary fall risk management equipment  - Apply yellow socks and bracelet for high fall risk patients  - Consider moving patient to room near nurses station  Outcome: Progressing  Goal: Maintain or return to baseline ADL function  Description: INTERVENTIONS:  -  Assess patient's ability to carry out ADLs; assess patient's baseline for ADL function and identify physical deficits which impact ability to perform ADLs (bathing, care of mouth/teeth, toileting, grooming, dressing, etc.)  - Assess/evaluate cause of self-care deficits   - Assess range of motion  - Assess patient's mobility; develop plan if impaired  - Assess patient's need for assistive devices and provide as appropriate  - Encourage maximum independence but intervene and supervise when necessary  - Involve family in performance of ADLs  - Assess for home care needs following discharge   - Consider OT consult to assist with ADL evaluation and planning for discharge  - Provide patient education as appropriate  - Monitor functional capacity and physical performance, use of AM PAC & JH-HLM   - Monitor gait, balance and fatigue with ambulation    Outcome: Progressing  Goal: Maintains/Returns to pre admission functional level  Description: INTERVENTIONS:  - Perform AM-PAC 6 Click Basic Mobility/ Daily Activity assessment daily.  - Set and communicate daily mobility goal to care team and patient/family/caregiver.   - Collaborate with rehabilitation services on mobility goals if consulted  - Perform Range of Motion 3 times a day.  - Reposition patient every 2 hours.  - Out of bed for toileting  - Record patient progress and toleration of activity level   Outcome: Progressing

## 2025-05-28 NOTE — ASSESSMENT & PLAN NOTE
Patient presented with distal left arm pain/numbness/weakness since 2 AM on 5/26. Patient reports having numbness on bilateral 5th fingers for the past month, however weakness & pain from left elbow down to left hand is new.  Initial concern for stroke given prior hx. not a candidate for TNK, reports taking her Xarelto at around 2:30 AM PTA.  CTA head/neck: No acute intracranial hemorrhage, sequela of old infarction left thalamus, left caudate and left ganglial capsular region. No large vessel arterial occlusion, significant stenosis or focal aneurysm.  MRI brain showed no evidence of acute infarction  Received aspirin 325 mg in ED. Restarted baby ASA, Xarelto & atorvastatin at higher dose.  Neurology signed off, symptoms likely due to neuropathy.  Pain management with IV Toradol, Tylenol, switch Robaxin to Flexeril, gabapentin; PRN oxycodone and IV morphine.  Consult orthopedic surgery with ongoing pain, consider compression neuropathy of left elbow  Consider neuromuscular evaluation in 4-6 weeks

## 2025-05-28 NOTE — TELEMEDICINE
e-Consult (IPC)  - Interventional Radiology  Tony Roberto 39 y.o. female MRN: [id number]  Unit/Bed#: -01 Encounter: [id number]          Interventional Radiology has been consulted to evaluate Tony Roberto    We were consulted by Vascular Surgery concerning this patient with right foot wound and pain.    Inpatient Consult to IR  Consult performed by: EMILE Lara  Consult ordered by: Shanthi Lane PA-C        05/28/25    Assessment/Recommendation:   Tony Roberto, 39y female with COPD, T2DM, HTN, Bipolar, HLD, CVA, Anemia, L AKA, failed left fem-pop bypass, PAD and right foot wound.    Patient presented to the ER on 5/26 with left hand pain that radiates to the elbow with numbness x 4 weeks.    Patient had stroke work-up which is negative.  Patient was found to have right foot wounds and Vascular Surgery was consulted.    Patient had a duplex study that showed:  RIGHT LOWER LIMB  Diffuse disease noted throughout the femoral-popliteal arteries with 50-75%  stenosis noted in the proximal superficial femoral artery.  There is trickle flow in the mid superficial femoral artery with a collateral  noted suggestive of high grade stenosis vs near occlusion.  Ankle/Brachial Index: 0.57  which is in the severe range. (Prior: 0.79)  PPG/PVR Tracings are dampened.  Metatarsal Pressure: 77 mm Hg.  Great Toe Pressure: 50 mm Hg, borderline within the healing range for a diabetic.    Vascular Surgery recommending angiogram with possible intervention to help optimize healing.    Patient is on Xarelto, will need to HOLD x two days. Last dose 5/27.    Pending INR <1.8    Unfortunately, there is no available IR time slot at [facility] this week. Will plan for a boomerang appointment at [facility] Friday.    Plan -  RLE angiogram with possible intervention Friday morning at [facility].  HOLD Xarelto  Pending INR <1.8  NPO after midnight prior to procedure.    Please contact PACs to coordinate transportation  for raymond vo.    31 + minutes, >50% of the total time devoted to medical consultative verbal/EMR discussion between providers. Written report will be generated in the EMR.     Thank you for allowing Interventional Radiology to participate in the care of Tony Roberto. Please don't hesitate to call or TigerText us with any questions.     EMILE Lara

## 2025-05-28 NOTE — CONSULTS
Consultation - Podiatry   Name: Tony Roberto 39 y.o. female I MRN: [id number]  Unit/Bed#: -01 I Date of Admission: 5/26/2025   Date of Service: 5/28/2025 I Hospital Day: 1  Inpatient consult to Podiatry  Consult performed by: Tiago Moreno DPM  Consult ordered by: Shanthi Lane PA-C        Physician Requesting Evaluation: Mila Mirza DO   Reason for Evaluation / Principal Problem: Ischemic wounds right lower extremity    Assessment & Plan  Left hand pain    Essential hypertension    Type 2 diabetes mellitus with diabetic polyneuropathy, with long-term current use of insulin (HCC)  Lab Results   Component Value Date    HGBA1C 13.3 (H) 05/26/2025       Recent Labs     05/27/25 2003 05/28/25  0703 05/28/25  1131 05/28/25  1614   POCGLU 346* 242* 222* 334*       Blood Sugar Average: Last 72 hrs:  (P) 286.1067241805540757    COPD (chronic obstructive pulmonary disease)/asthma    Bipolar disorder    Peripheral arterial disease (HCC)    Class 3 severe obesity due to excess calories without serious comorbidity with body mass index (BMI) of 45.0 to 49.9 in adult    S/P AKA (above knee amputation) unilateral, left (HCC)    Mixed hyperlipidemia    Tobacco abuse    Hyponatremia    History of substance use    History of CVA (cerebrovascular accident)    Wound of right foot    - The patient was seen and examined at bedside, resting in NAD.  She notes no pain to the right lower extremity wounds.  She does have a history of prior left AKA.  The wounds to the lateral right ankle and dorsum of the right great toe joint are very superficial and noninfected.  There is no tenderness to palpation of the wounds.  There is no drainage nor purulence.  There is no erythema nor calor nor ecchymosis.  Pedal pulses on the right are nonpalpable.  - The wounds are stable and noninfected.  I would recommend local wound care with hydrogel with silver and a dry dressing daily.  She can weight-bear on the right lower extremity as  tolerated.  Recommend follow-up with me in the office within 1 week after discharge to follow-up on her pedal wounds.  -X-rays of her right foot and ankle were personally reviewed and interpreted.  I saw no signs of osteomyelitis or any other acute osseous pathology on either plain film study.  - The patient does have a history of peripheral arterial disease on the right lower extremity with a recent NIKKI of 0.57.    - Plans for boomerang transfer to [facility] for right lower extremity angiogram on 5/30/2025 noted.        History of Present Illness   Tony Roberto is a 39 y.o. female who presents with ulcerations to the right lower extremity, specifically over her lateral ankle and dorsum of the right great toe joint.  The patient states that these wounds have healed multiple times, however she keeps picking on the scabs causing them to reopen.  She does have a history of bilateral peripheral arterial disease of the lower extremities and does have a history of a AKA on the left.  Notes no pain associated with the wounds of her right foot.    Review of Systems  Medical History Review: I have reviewed the patient's PMH, PSH, Social History, Family History, Meds, and Allergies     Objective :  Temp:  [97.8 °F (36.6 °C)-98.5 °F (36.9 °C)] 97.8 °F (36.6 °C)  HR:  [62-93] 79  BP: (126-164)/(68-94) 136/77  Resp:  [16-18] 18  SpO2:  [95 %-100 %] 98 %  O2 Device: None (Room air)    Physical Exam     (+) The patient was seen and examined at bedside, resting in NAD.  She notes no pain to the right lower extremity wounds.  She does have a history of prior left AKA.  The wounds to the lateral right ankle and dorsum of the right great toe joint are very superficial and noninfected.  There is no tenderness to palpation of the wounds.  There is no drainage nor purulence.  There is no erythema nor calor nor ecchymosis.  Pedal pulses on the right are nonpalpable.      Lab Results: I have reviewed the following results:CBC/BMP:   .      "05/28/25  0521   WBC 8.87   HGB 13.4   HCT 39.3      SODIUM 135   K 3.9      CO2 28   BUN 17   CREATININE 0.52*   GLUC 235*   MG 2.0    , Lactic Acid: No new results in last 24 hours. , Procalcitonin: No results found for: \"PROCALCITONI\"    "

## 2025-05-28 NOTE — UTILIZATION REVIEW
NOTIFICATION OF INPATIENT ADMISSION   AUTHORIZATION REQUEST   SERVICING FACILITY:   [place] [address]  Tax ID: [id number]  NPI: [id number] ATTENDING PROVIDER:  Attending Name and NPI#: Mila Mirza Do [[id number]]  Address: [address] [place] [address]  Phone:  [phone]     ADMISSION INFORMATION:  Place of Service: Inpatient [facility]  Place of Service Code: 21  Inpatient Admission Date/Time: 5/27/25  1:05 PM  Discharge Date/Time: No discharge date for patient encounter.  Admitting Diagnosis Code/Description:  Hand pain [M79.643]  Left hand pain [M79.642]  Stroke-like symptoms [R29.90]     UTILIZATION REVIEW CONTACT:  Bettie Phan, Utilization   Network Utilization Review Department  Phone: [phone]  Fax: [phone]  Email: [email or web address]  Contact for approvals/pending authorizations, clinical reviews, and discharge.     PHYSICIAN ADVISORY SERVICES:  Medical Necessity Denial & Ycdp-qc-Cgcu Review  Phone: [phone]  Fax: [phone]  Email: [email or web address]     DISCHARGE SUPPORT TEAM:  For Patients Discharge Needs & Updates  Phone: [phone] opt. 2 Fax: [phone]  Email: [email or web address]

## 2025-05-28 NOTE — PROGRESS NOTES
Progress Note - Hospitalist   Name: Tony Roberto 39 y.o. female I MRN: [id number]  Unit/Bed#: -01 I Date of Admission: 5/26/2025   Date of Service: 5/28/2025 I Hospital Day: 1    Assessment & Plan  Left hand pain  Patient presented with distal left arm pain/numbness/weakness since 2 AM on 5/26. Patient reports having numbness on bilateral 5th fingers for the past month, however weakness & pain from left elbow down to left hand is new.  Initial concern for stroke given prior hx. not a candidate for TNK, reports taking her Xarelto at around 2:30 AM PTA.  CTA head/neck: No acute intracranial hemorrhage, sequela of old infarction left thalamus, left caudate and left ganglial capsular region. No large vessel arterial occlusion, significant stenosis or focal aneurysm.  MRI brain showed no evidence of acute infarction  Received aspirin 325 mg in ED. Restarted baby ASA, Xarelto & atorvastatin at higher dose.  Neurology signed off, symptoms likely due to neuropathy.  Pain management with IV Toradol, Tylenol, switch Robaxin to Flexeril, gabapentin; PRN oxycodone and IV morphine.  Consult orthopedic surgery with ongoing pain, consider compression neuropathy of left elbow  Consider neuromuscular evaluation in 4-6 weeks  Type 2 diabetes mellitus with diabetic polyneuropathy, with long-term current use of insulin ([facility])  Lab Results   Component Value Date    HGBA1C 13.3 (H) 05/26/2025     Recent Labs     05/27/25  1621 05/27/25  2003 05/28/25  0703 05/28/25  1131   POCGLU 175* 346* 242* 222*     Blood Sugar Average: Last 72 hrs:  (P) [id number]    Poorly controlled T2DM per most recent HgbA1c 11. Patient admits to noncompliance with insulin/medications, notes difficulty with affording and running out of supplies for glucometer & recently applied for Medicaid.  Updated HgbA1c 13.3, very poorly controlled T2DM.  Prior home regimen: Lantus 30U BID, Humalog SSI 4-16U TID, Trulicity weekly injections.   Increase  Lantus 25->30U BID, likely will need prescription & price check for diabetic supplies   SSI coverage with Accu-Cheks ACHS  Carb controlled diet, hypoglycemia protocol  Peripheral arterial disease (HCC)  History of PAD, known CLTI s/p left fem-BK pop bypass which subsequently occluded; s/p L AKA for nonsalvageable LLE in 2023  Last LEAD (3/21/25) revealed diffuse disease, significant decrease in right NIKKI now in severe range at 0.57  Vascular surgery consult while inpatient, patient with ongoing intermittent RLE claudication pain   Vascular surgery consulted, and for boomerang transfer to [facility] for angiogram Friday 5/30  Wound of right foot  Wound on right lateral ankle and right first metatarsal base POA, patient reports sustaining right ankle wound from scraping on her wheelchair but has had poor healing  XR right foot/ankle with no acute osseous abnormalities  Does not appear infectious, consult podiatry for wound care recommendations  History of CVA (cerebrovascular accident)  History of prior strokes, initially found to have evidence of chronic infarcts on CT head in December 2024  Suspect possibly related to medication noncompliance, poorly controlled HTN and T2DM  Echo: EF 60%, normal systolic and diastolic function. No PFO with bubble study.  MRI brain this admission with no acute stroke  Updated lipid panel: , , HDL 50, .  Resumed home Xarelto, baby aspirin  Increased atorvastatin 40->80 mg daily  Essential hypertension  Hypertensive urgency on admission, BP up to 200/110s  Reports noncompliance with home antihypertensives  BP reviewed, HTN since improved  Restart home lisinopril 20 mg daily, prazosin 1 mg qHS  History of substance use  History of cocaine use, patient admits to smoking crack cocaine 4 days PTA & marijuana 2 days PTA  UDS: + Cocaine and THC. No signs of withdrawal.  Strongly encouraged cessation of substance use  Class 3 severe obesity due to excess calories without serious  comorbidity with body mass index (BMI) of 45.0 to 49.9 in adult  BMI 41.57, affects all aspects of care  Strongly encourage dietary/lifestyle modifications  COPD (chronic obstructive pulmonary disease)/asthma  Not in acute exacerbation, currently stable on room air  Resume daily maintenance inhaler, PRN albuterol  S/P AKA (above knee amputation) unilateral, left (HCC)  S/p left AKA on 3/29/2023, wheelchair-bound at baseline  Tobacco abuse  Nicotine patches, smoking cessation education    VTE Pharmacologic Prophylaxis: VTE Score: 3 Moderate Risk (Score 3-4) - Pharmacological DVT Prophylaxis Contraindicated. Sequential Compression Devices Ordered.    Mobility:   Basic Mobility Inpatient Raw Score: 24  JH-HLM Goal: 8: Walk 250 feet or more  JH-HLM Achieved: 4: Move to chair/commode  JH-HLM Goal NOT achieved. Continue with multidisciplinary rounding and encourage appropriate mobility to improve upon JH-HLM goals.    Patient Centered Rounds: I performed bedside rounds with nursing staff today.   Discussions with Specialists or Other Care Team Provider: Orthopedic surgery, podiatry    Education and Discussions with Family / Patient: Patient declined call to .     Current Length of Stay: 1 day(s)  Current Patient Status: Inpatient   Certification Statement: The patient will continue to require additional inpatient hospital stay due to pain in left hand/wrist  Discharge Plan: Anticipate discharge in >72 hrs to home.    Code Status: Level 1 - Full Code    Subjective   Patient seen at bedside this a.m., reports ongoing, persistent pain in right hand/wrist currently 10/10, states that it is sharp/shooting pain in nature though no muscle spasms/cramping.    Objective :  Temp:  [97.8 °F (36.6 °C)-98.5 °F (36.9 °C)] 98.5 °F (36.9 °C)  HR:  [62-93] 76  BP: (126-194)/(68-96) 126/68  Resp:  [16-18] 16  SpO2:  [95 %-100 %] 98 %  O2 Device: None (Room air)    Body mass index is 41.57 kg/m².     Input and Output Summary  (last 24 hours):     Intake/Output Summary (Last 24 hours) at 5/28/2025 1513  Last data filed at 5/28/2025 0623  Gross per 24 hour   Intake 774 ml   Output 1900 ml   Net -1126 ml       Physical Exam  Constitutional:       General: She is not in acute distress.     Appearance: She is obese. She is not ill-appearing, toxic-appearing or diaphoretic.     Cardiovascular:      Rate and Rhythm: Normal rate and regular rhythm.      Pulses: Normal pulses.      Heart sounds: Normal heart sounds.   Pulmonary:      Effort: Pulmonary effort is normal. No respiratory distress.      Breath sounds: Normal breath sounds.   Abdominal:      General: There is no distension.      Palpations: Abdomen is soft.      Tenderness: There is no abdominal tenderness.     Musculoskeletal:         General: No swelling or tenderness.      Comments: S/p left AKA. ROM of left hand/wrist limited by pain, difficulty making fist.     Skin:     General: Skin is warm.     Neurological:      General: No focal deficit present.      Mental Status: She is alert.     Psychiatric:         Mood and Affect: Mood normal.         Behavior: Behavior normal.           Lines/Drains:              Lab Results: I have reviewed the following results:   Results from last 7 days   Lab Units 05/28/25  0521   WBC Thousand/uL 8.87   HEMOGLOBIN g/dL 13.4   HEMATOCRIT % 39.3   PLATELETS Thousands/uL 311   SEGS PCT % 54   LYMPHO PCT % 34   MONO PCT % 5   EOS PCT % 5     Results from last 7 days   Lab Units 05/28/25  0521 05/27/25  0532   SODIUM mmol/L 135 134*   POTASSIUM mmol/L 3.9 3.5   CHLORIDE mmol/L 101 99   CO2 mmol/L 28 27   BUN mg/dL 17 13   CREATININE mg/dL 0.52* 0.41*   ANION GAP mmol/L 6 8   CALCIUM mg/dL 8.2* 8.5   ALBUMIN g/dL  --  3.5   TOTAL BILIRUBIN mg/dL  --  0.25   ALK PHOS U/L  --  108*   ALT U/L  --  15   AST U/L  --  10*   GLUCOSE RANDOM mg/dL 235* 205*     Results from last 7 days   Lab Units 05/28/25  0521   INR  1.08     Results from last 7 days   Lab  Units 05/28/25  1131 05/28/25  0703 05/27/25 2003 05/27/25  1621 05/27/25  1125 05/27/25  0717 05/26/25 2019 05/26/25  1609 05/26/25  1100 05/26/25  1011 05/26/25  0247   POC GLUCOSE mg/dl 222* 242* 346* 175* 208* 182* 222* 362* 394* 393* 354*     Results from last 7 days   Lab Units 05/26/25  0321   HEMOGLOBIN A1C % 13.3*     Results from last 7 days   Lab Units 05/26/25  0321   LACTIC ACID mmol/L 1.5       Recent Cultures (last 7 days):         Imaging Results Review: No pertinent imaging studies reviewed.  Other Study Results Review: No additional pertinent studies reviewed.    Last 24 Hours Medication List:     Current Facility-Administered Medications:     acetaminophen (TYLENOL) tablet 975 mg, Q8H ABDULKADIR    albuterol (PROVENTIL HFA,VENTOLIN HFA) inhaler 1 puff, Q4H PRN    Artificial Tears Op Soln 2 drop, Q6H PRN    aspirin chewable tablet 81 mg, Daily    atorvastatin (LIPITOR) tablet 80 mg, Daily With Dinner    cyclobenzaprine (FLEXERIL) tablet 10 mg, TID    DULoxetine (CYMBALTA) delayed release capsule 60 mg, Daily    Fluticasone Furoate-Vilanterol 100-25 mcg/actuation 1 puff, Daily    gabapentin (NEURONTIN) capsule 100 mg, TID    hydrALAZINE (APRESOLINE) injection 5 mg, Q6H PRN    hydrOXYzine HCL (ATARAX) tablet 25 mg, BID PRN    insulin glargine (LANTUS) subcutaneous injection 30 Units 0.3 mL, Q12H ABDULKADIR    insulin lispro (HumALOG/ADMELOG) 100 units/mL subcutaneous injection 1-5 Units, HS    insulin lispro (HumALOG/ADMELOG) 100 units/mL subcutaneous injection 1-6 Units, TID AC **AND** Fingerstick Glucose (POCT), TID AC    ketorolac (TORADOL) injection 15 mg, Q6H ABDULKADIR    lisinopril (ZESTRIL) tablet 20 mg, Daily    magnesium Oxide (MAG-OX) tablet 400 mg, BID    morphine injection 4 mg, Q4H PRN    nicotine (NICODERM CQ) 21 mg/24 hr TD 24 hr patch 1 patch, Daily    oxyCODONE (ROXICODONE) IR tablet 2.5 mg, Q6H PRN    oxyCODONE (ROXICODONE) IR tablet 5 mg, Q6H PRN    prazosin (MINIPRESS) capsule 1 mg, HS    [Held by  provider] rivaroxaban (XARELTO) tablet 20 mg, Daily With Breakfast    Administrative Statements   Today, Patient Was Seen By: Shanthi Lane PA-C  I have spent a total time of 45 minutes in caring for this patient on the day of the visit/encounter including Patient and family education, Documenting in the medical record, Reviewing/placing orders in the medical record (including tests, medications, and/or procedures), and Communicating with other healthcare professionals .    **Please Note: This note may have been constructed using a voice recognition system.**

## 2025-05-28 NOTE — ASSESSMENT & PLAN NOTE
History of prior strokes, initially found to have evidence of chronic infarcts on CT head in December 2024  Suspect possibly related to medication noncompliance, poorly controlled HTN and T2DM  Echo: EF 60%, normal systolic and diastolic function. No PFO with bubble study.  MRI brain this admission with no acute stroke  Updated lipid panel: , , HDL 50, .  Resumed home Xarelto, baby aspirin  Increased atorvastatin 40->80 mg daily

## 2025-05-28 NOTE — ASSESSMENT & PLAN NOTE
Patient presented to the emergency room on 05/26/2025 secondary to left hand pain and numbness x 4 weeks. Xray of the left hand, wrist, and forearm negative for acute fracture or dislocation.  Upon evaluation, patient notes complete numbness to the left hand as well as radial aspect of the forearm.  Denies trauma, open wounds, or animal bite.  No pain with passive stretch.  Able to wiggle fingers and perform micromotion movements without pain.  No concern for compartment syndrome at this time.  Weightbearing as tolerated to the left upper extremity  No acute orthopedic intervention  Recommend MRI of the cervical spine   In addition, could consider EMG upon discharge   Recommend formal Neurology consult   Medical management per primary team   Pain control per primary team   Case discussed and reviewed with Dr. Ramirez

## 2025-05-28 NOTE — CONSULTS
Consultation - Orthopedics   Name: Tony Roberto 39 y.o. female I MRN: [id number]  Unit/Bed#: -01 I Date of Admission: 5/26/2025   Date of Service: 5/28/2025 I Hospital Day: 1   Inpatient consult to Orthopedic Surgery  Consult performed by: Zainab Fonseca PA-C  Consult ordered by: Shanthi Lane PA-C        Physician Requesting Evaluation: Mila Mirza DO   Reason for Evaluation / Principal Problem: Left hand pain and numbness    Assessment & Plan  Left hand pain  Patient presented to the emergency room on 05/26/2025 secondary to left hand pain and numbness x 4 weeks. Xray of the left hand, wrist, and forearm negative for acute fracture or dislocation.  Upon evaluation, patient notes complete numbness to the left hand as well as radial aspect of the forearm.  Denies trauma, open wounds, or animal bite.  No pain with passive stretch.  Able to wiggle fingers and perform micromotion movements without pain.  No concern for compartment syndrome at this time.  Weightbearing as tolerated to the left upper extremity  No acute orthopedic intervention  Recommend MRI of the cervical spine   In addition, could consider EMG upon discharge   Recommend formal Neurology consult   Medical management per primary team   Pain control per primary team   Case discussed and reviewed with Dr. Ramirez   Orthopedics service signing off.  Please contact the SecureChat role for the Orthopedics service with any questions/concerns.    History of Present Illness   HPI: Tony Roberto is a 39 y.o. year old female with PMH OF CVA, COPD, type II DM, PAD, hyponatremia. Past surgical history of LT AKA secondary to history of blood clots, currently on Xarelto.  Patient with poorly controlled diabetes states noncompliance with insulin, most recent hemoglobin A1c was 13.  Presented to the emergency department on 05/26/2025 secondary to left hand pain and numbness.  An MRI of the brain was completed to rule out stroke secondary to  hand numbness with history of stroke like symptoms.  Orthopedics was consulted secondary to persistent numbness as well as pain to the left hand radiating to the elbow.    Upon evaluation, patient notes numbness throughout the whole hand as well as the radial side of the forearm.  She notes onset of numbness about 4 weeks ago but states it progressed to complete numbness causing her to come to the ER.  She notes history of neuropathy to the right lower extremity as well as some chronic numbness and tingling to the bilateral upper extremities.  She also notes poor control of diabetes with noncompliance with insulin and recent hemoglobin A1c of 13.  She denies recent trauma, open wounds, animal bite, cuts from metal, yard work or time spent in the woods.  She does admit to some neck pain prior to onset of numbness and tingling as well as pain to the right shoulder.  She denies fever, chills.    Family present at bedside upon evaluation.    Review of Systems   Constitutional:  Negative for chills and fever.   HENT:  Negative for ear pain and sore throat.    Eyes:  Negative for pain and visual disturbance.   Respiratory:  Negative for cough and shortness of breath.    Cardiovascular:  Negative for chest pain and palpitations.   Gastrointestinal:  Negative for abdominal pain and vomiting.   Genitourinary:  Negative for dysuria and hematuria.   Musculoskeletal:  Positive for arthralgias and neck pain. Negative for back pain.   Skin:  Negative for color change and rash.   Neurological:  Negative for seizures and syncope.   All other systems reviewed and are negative.   significant for findings described in the HPI.  Historical Information   Past Medical History[1]  Past Surgical History[2]  Social History[3]  E-Cigarette/Vaping    E-Cigarette Use Never User      E-Cigarette/Vaping Substances    Nicotine No     THC No     CBD No     Flavoring No     Other No     Unknown No      Family History[4]    Objective :  Temp:  [97.8  °F (36.6 °C)-98.5 °F (36.9 °C)] 98.5 °F (36.9 °C)  HR:  [62-93] 76  BP: (126-194)/(68-96) 126/68  Resp:  [16-18] 16  SpO2:  [95 %-100 %] 98 %  O2 Device: None (Room air)  Physical ExamOrtho Exam   Musculoskeletal: Left upper extremity  Skin intact. No open wounds noted. No erythema or bruising.   Skin of the left hand, wrist, forearm soft and compressible without pain   Loss of sensation to the palmar and dorsal aspect of the left hand/ digits. Loss of sensation to the radial aspect of the forearm. Full sensation to the ulnar aspect of the forearm but no sensation to left small finger.   Able to wiggle digits. Able to perform micromotion of the digits without pain.   No pain with passive stretch   Full ROM at wrist, elbow, shoulder without pain   +EPL/FPL   Digits warm and well perfused       Lab Results: I have reviewed the following results:   Recent Labs     05/26/25  0321 05/27/25  0532 05/28/25  0521   WBC 10.89* 9.48 8.87   HGB 13.4 13.4 13.4   HCT 37.6 37.9 39.3   * 379 311   BUN 14 13 17   CREATININE 0.53* 0.41* 0.52*   INR  --   --  1.08     Blood Culture:   Lab Results   Component Value Date    BLOODCX No Growth After 5 Days. 10/02/2021    BLOODCX No Growth After 5 Days. 10/02/2021     Wound Culture:   Lab Results   Component Value Date    WOUNDCULT 1+ Growth of Beta Hemolytic Streptococcus Group B (A) 09/04/2023    WOUNDCULT 2+ Growth of 09/04/2023       Imaging Results Review: I reviewed radiology reports from this admission including: xray(s).  X-ray of the left hand, wrist, and forearm negative for acute fracture or dislocation.  No acute osseous abnormality.  No significant degenerative change.  Other Study Results Review: No additional pertinent studies reviewed.  Imaging discussed and reviewed with Dr. Ramirez        [1]   Past Medical History:  Diagnosis Date    Asthma     Atherosclerosis     Bipolar 1 disorder (HCC)     COPD (chronic obstructive pulmonary disease) (HCC)     Depression   "   Diabetes mellitus (HCC)     Hypertension     Psychiatric disorder     cutting history    PTSD (post-traumatic stress disorder)     Schizoaffective disorder (HCC)     Tendonitis    [2]   Past Surgical History:  Procedure Laterality Date    AMPUTATION ABOVE KNEE (AKA) Left 3/29/2023    Procedure: AMPUTATION ABOVE KNEE (AKA);  Surgeon: Savi Langford MD;  Location: [facility];  Service: Vascular    BYPASS FEMORAL-POPLITEAL Left 2021    Procedure: Left Common Femoral Below Knee to Popliteal Bypass with Insitu GSV graft.  Left Lower Extremity Angiogram;  Surgeon: Savi Langford MD;  Location: [facility];  Service: Vascular     SECTION      EAR SURGERY      FL LUMBAR PUNCTURE DIAGNOSTIC  10/25/2018    IR LOWER EXTREMITY ANGIOGRAM  2021    IR LOWER EXTREMITY ANGIOGRAM  2021    IR LOWER EXTREMITY ANGIOGRAM  3/24/2023    SD SLCTV CATHJ 3RD+ ORD SLCTV ABDL PEL/LXTR BRNCH Left 3/24/2023    Procedure: Left leg angiogram;  Surgeon: Byron Wahl DO;  Location: [facility];  Service: Vascular    TUBAL LIGATION     [3]   Social History  Tobacco Use    Smoking status: Every Day     Current packs/day: 1.00     Average packs/day: 1 pack/day for 22.0 years (22.0 ttl pk-yrs)     Types: Cigarettes     Start date: 3/24/2003     Last attempt to quit: 3/24/2023     Passive exposure: Current    Smokeless tobacco: Former     Quit date: 2022   Vaping Use    Vaping status: Never Used   Substance and Sexual Activity    Alcohol use: Not Currently     Comment: not currently    Drug use: Yes     Types: \"Crack\" cocaine, Marijuana     Comment: 1 years clean from crack cocaine since     Sexual activity: Yes     Partners: Female, Male     Birth control/protection: None, Condom   [4]   Family History  Problem Relation Name Age of Onset    Hypertension Mother      Diabetes Mother      HIV Mother      Heart disease Mother      No Known Problems Father       "

## 2025-05-28 NOTE — ASSESSMENT & PLAN NOTE
- The patient was seen and examined at bedside, resting in NAD.  She notes no pain to the right lower extremity wounds.  She does have a history of prior left AKA.  The wounds to the lateral right ankle and dorsum of the right great toe joint are very superficial and noninfected.  There is no tenderness to palpation of the wounds.  There is no drainage nor purulence.  There is no erythema nor calor nor ecchymosis.  Pedal pulses on the right are nonpalpable.  - The wounds are stable and noninfected.  I would recommend local wound care with hydrogel with silver and a dry dressing daily.  She can weight-bear on the right lower extremity as tolerated.  Recommend follow-up with me in the office within 1 week after discharge to follow-up on her pedal wounds.  -X-rays of her right foot and ankle were personally reviewed and interpreted.  I saw no signs of osteomyelitis or any other acute osseous pathology on either plain film study.  - The patient does have a history of peripheral arterial disease on the right lower extremity with a recent NKIKI of 0.57.    - Plans for [facility] transfer to [facility] for right lower extremity angiogram on 5/30/2025 noted.

## 2025-05-28 NOTE — ASSESSMENT & PLAN NOTE
Hypertensive urgency on admission, BP up to 200/110s  Reports noncompliance with home antihypertensives  BP reviewed, HTN since improved  Restart home lisinopril 20 mg daily, prazosin 1 mg qHS

## 2025-05-28 NOTE — ASSESSMENT & PLAN NOTE
Wound on right lateral ankle and right first metatarsal base POA, patient reports sustaining right ankle wound from scraping on her wheelchair but has had poor healing  XR right foot/ankle with no acute osseous abnormalities  Does not appear infectious, consult podiatry for wound care recommendations

## 2025-05-28 NOTE — PLAN OF CARE
Problem: PAIN - ADULT  Goal: Verbalizes/displays adequate comfort level or baseline comfort level  Description: Interventions:  - Encourage patient to monitor pain and request assistance  - Assess pain using appropriate pain scale  - Administer analgesics as ordered based on type and severity of pain and evaluate response  - Implement non-pharmacological measures as appropriate and evaluate response  - Consider cultural and social influences on pain and pain management  - Notify physician/advanced practitioner if interventions unsuccessful or patient reports new pain  - Educate patient/family on pain management process including their role and importance of  reporting pain   - Provide non-pharmacologic/complimentary pain relief interventions  5/28/2025 0614 by Ivelisse Christine RN  Outcome: Progressing  5/27/2025 2314 by Ivelisse Christine RN  Outcome: Not Progressing     Problem: Neurological Deficit  Goal: Neurological status is stable or improving  Description: Interventions:  - Monitor and assess patient's level of consciousness, motor function, sensory function, and level of assistance needed for ADLs.   - Monitor and report changes from baseline. Collaborate with interdisciplinary team to initiate plan and implement interventions as ordered.   - Provide and maintain a safe environment.  - Consider seizure precautions.  - Consider fall precautions.  - Consider aspiration precautions.  - Consider bleeding precautions.  Outcome: Progressing

## 2025-05-28 NOTE — ASSESSMENT & PLAN NOTE
Lab Results   Component Value Date    HGBA1C 13.3 (H) 05/26/2025     Recent Labs     05/27/25  1621 05/27/25 2003 05/28/25  0703 05/28/25  1131   POCGLU 175* 346* 242* 222*     Blood Sugar Average: Last 72 hrs:  (P) [id number]    Poorly controlled T2DM per most recent HgbA1c 11. Patient admits to noncompliance with insulin/medications, notes difficulty with affording and running out of supplies for glucometer & recently applied for Medicaid.  Updated HgbA1c 13.3, very poorly controlled T2DM.  Prior home regimen: Lantus 30U BID, Humalog SSI 4-16U TID, Trulicity weekly injections.   Increase Lantus 25->30U BID, likely will need prescription & price check for diabetic supplies   SSI coverage with Accu-Cheks ACHS  Carb controlled diet, hypoglycemia protocol

## 2025-05-29 ENCOUNTER — APPOINTMENT (INPATIENT)
Dept: MRI IMAGING | Facility: HOSPITAL | Age: 40
DRG: 038 | End: 2025-05-29
Payer: COMMERCIAL

## 2025-05-29 LAB
GLUCOSE SERPL-MCNC: 167 MG/DL (ref 65–140)
GLUCOSE SERPL-MCNC: 255 MG/DL (ref 65–140)
GLUCOSE SERPL-MCNC: 275 MG/DL (ref 65–140)
GLUCOSE SERPL-MCNC: 276 MG/DL (ref 65–140)
VIT B12 SERPL-MCNC: 363 PG/ML (ref 180–914)

## 2025-05-29 PROCEDURE — 82607 VITAMIN B-12: CPT

## 2025-05-29 PROCEDURE — 72141 MRI NECK SPINE W/O DYE: CPT

## 2025-05-29 PROCEDURE — 99232 SBSQ HOSP IP/OBS MODERATE 35: CPT

## 2025-05-29 PROCEDURE — 82948 REAGENT STRIP/BLOOD GLUCOSE: CPT

## 2025-05-29 RX ADMIN — CYCLOBENZAPRINE 10 MG: 10 TABLET, FILM COATED ORAL at 16:57

## 2025-05-29 RX ADMIN — OXYCODONE 5 MG: 5 TABLET ORAL at 16:58

## 2025-05-29 RX ADMIN — ACETAMINOPHEN 975 MG: 325 TABLET, FILM COATED ORAL at 14:26

## 2025-05-29 RX ADMIN — MORPHINE SULFATE 4 MG: 4 INJECTION INTRAVENOUS at 20:10

## 2025-05-29 RX ADMIN — INSULIN LISPRO 4 UNITS: 100 INJECTION, SOLUTION INTRAVENOUS; SUBCUTANEOUS at 09:51

## 2025-05-29 RX ADMIN — GABAPENTIN 100 MG: 100 CAPSULE ORAL at 21:52

## 2025-05-29 RX ADMIN — GABAPENTIN 100 MG: 100 CAPSULE ORAL at 09:51

## 2025-05-29 RX ADMIN — INSULIN LISPRO 4 UNITS: 100 INJECTION, SOLUTION INTRAVENOUS; SUBCUTANEOUS at 16:55

## 2025-05-29 RX ADMIN — INSULIN LISPRO 1 UNITS: 100 INJECTION, SOLUTION INTRAVENOUS; SUBCUTANEOUS at 12:18

## 2025-05-29 RX ADMIN — CYCLOBENZAPRINE 10 MG: 10 TABLET, FILM COATED ORAL at 21:52

## 2025-05-29 RX ADMIN — KETOROLAC TROMETHAMINE 15 MG: 30 INJECTION, SOLUTION INTRAMUSCULAR at 05:58

## 2025-05-29 RX ADMIN — MORPHINE SULFATE 4 MG: 4 INJECTION INTRAVENOUS at 12:26

## 2025-05-29 RX ADMIN — Medication 400 MG: at 17:02

## 2025-05-29 RX ADMIN — INSULIN GLARGINE 30 UNITS: 100 INJECTION, SOLUTION SUBCUTANEOUS at 09:51

## 2025-05-29 RX ADMIN — DULOXETINE HYDROCHLORIDE 60 MG: 60 CAPSULE, DELAYED RELEASE ORAL at 09:50

## 2025-05-29 RX ADMIN — Medication 400 MG: at 12:18

## 2025-05-29 RX ADMIN — LISINOPRIL 20 MG: 20 TABLET ORAL at 09:50

## 2025-05-29 RX ADMIN — NICOTINE 1 PATCH: 21 PATCH, EXTENDED RELEASE TRANSDERMAL at 09:55

## 2025-05-29 RX ADMIN — PRAZOSIN HYDROCHLORIDE 1 MG: 1 CAPSULE ORAL at 21:52

## 2025-05-29 RX ADMIN — MORPHINE SULFATE 4 MG: 4 INJECTION INTRAVENOUS at 03:08

## 2025-05-29 RX ADMIN — KETOROLAC TROMETHAMINE 15 MG: 30 INJECTION, SOLUTION INTRAMUSCULAR at 00:04

## 2025-05-29 RX ADMIN — INSULIN LISPRO 2 UNITS: 100 INJECTION, SOLUTION INTRAVENOUS; SUBCUTANEOUS at 21:55

## 2025-05-29 RX ADMIN — INSULIN GLARGINE 30 UNITS: 100 INJECTION, SOLUTION SUBCUTANEOUS at 21:55

## 2025-05-29 RX ADMIN — CYCLOBENZAPRINE 10 MG: 10 TABLET, FILM COATED ORAL at 09:51

## 2025-05-29 RX ADMIN — ACETAMINOPHEN 975 MG: 325 TABLET, FILM COATED ORAL at 05:58

## 2025-05-29 RX ADMIN — ASPIRIN 81 MG 81 MG: 81 TABLET ORAL at 09:50

## 2025-05-29 RX ADMIN — GABAPENTIN 100 MG: 100 CAPSULE ORAL at 16:58

## 2025-05-29 RX ADMIN — OXYCODONE 5 MG: 5 TABLET ORAL at 10:01

## 2025-05-29 RX ADMIN — ACETAMINOPHEN 975 MG: 325 TABLET, FILM COATED ORAL at 21:52

## 2025-05-29 RX ADMIN — ATORVASTATIN CALCIUM 80 MG: 40 TABLET, FILM COATED ORAL at 16:58

## 2025-05-29 NOTE — TELEPHONE ENCOUNTER
Called pt, a friend answered and stated that she was getting an MRI done, she will ask pt to call us back .

## 2025-05-29 NOTE — ASSESSMENT & PLAN NOTE
Not in acute exacerbation, currently stable on room air  Continue daily maintenance inhaler, PRN albuterol

## 2025-05-29 NOTE — ASSESSMENT & PLAN NOTE
History of PAD, known CLTI s/p left fem-BK pop bypass which subsequently occluded; s/p L AKA for nonsalvageable LLE in 2023  Last LEAD (3/21/25) revealed diffuse disease, significant decrease in right NIKKI now in severe range at 0.57  Vascular surgery consulted,   Plan for [facility] transfer to [facility] for angiogram Friday 5/30  Continue [place] checks

## 2025-05-29 NOTE — PROGRESS NOTES
Progress Note - Hospitalist   Name: Tony Roberto 39 y.o. female I MRN: [id number]  Unit/Bed#: -01 I Date of Admission: 5/26/2025   Date of Service: 5/29/2025 I Hospital Day: 2    Assessment & Plan  Left hand pain  Patient presented with distal left arm pain/numbness/weakness since 2 AM on 5/26. Patient reports having numbness on bilateral 5th fingers for the past month, however weakness & pain from left elbow down to left hand is new.  Initial concern for stroke given prior hx. not a candidate for TNK, reports taking her Xarelto at around 2:30 AM PTA.  CTA head/neck: No acute intracranial hemorrhage, sequela of old infarction left thalamus, left caudate and left ganglial capsular region. No large vessel arterial occlusion, significant stenosis or focal aneurysm.  MRI brain showed no evidence of acute infarction  Received aspirin 325 mg in ED. Restarted baby ASA, Xarelto & atorvastatin at higher dose.  MRI Cervical Spine: Ordered  Follow Up Vitamin B12 level  Neurology signed off, symptoms likely due to neuropathy.  Orthopedic Surgery signed off,   Recommend MRI Cervical Spine. Consider neuro evaluation given possible CNS etiology.   No acute ortho intervention. Consider EMG/NCS as an outpatient   Continue Pain management   IV Toradol, Tylenol, switch Robaxin to Flexeril, gabapentin; PRN oxycodone and IV morphine.  Consider neuromuscular evaluation in 4-6 weeks  Type 2 diabetes mellitus with diabetic polyneuropathy, with long-term current use of insulin ([facility])  Lab Results   Component Value Date    HGBA1C 13.3 (H) 05/26/2025     Recent Labs     05/28/25  1131 05/28/25  1614 05/28/25  2037 05/29/25  0702   POCGLU 222* 334* 169* 275*     Blood Sugar Average: Last 72 hrs:  (P) 277    Poorly controlled T2DM per most recent HgbA1c 11. Patient admits to noncompliance with insulin/medications, notes difficulty with affording and running out of supplies for glucometer & recently applied for Medicaid.  Updated  HgbA1c 13.3, very poorly controlled T2DM.  Prior home regimen: Lantus 30U BID, Humalog SSI 4-16U TID, Trulicity weekly injections.   Pt with persistent Hyperglycemia  Continue Lantus 30U BID  Continue SSI coverage with Accu-Cheks ACHS  Carb controlled diet, hypoglycemia protocol  likely will need prescription & price check for diabetic supplies prior to discharge  Peripheral arterial disease (HCC)  History of PAD, known CLTI s/p left fem-BK pop bypass which subsequently occluded; s/p L AKA for nonsalvageable LLE in 2023  Last LEAD (3/21/25) revealed diffuse disease, significant decrease in right NIKKI now in severe range at 0.57  Vascular surgery consulted,   Plan for boomerang transfer to [facility]"" for angiogram Friday 5/30  Continue NV checks  Wound of right foot  Wound on right lateral ankle and right first metatarsal base POA, patient reports sustaining right ankle wound from scraping on her wheelchair but has had poor healing  XR right foot/ankle with no acute osseous abnormalities  Does not appear infectious  Podiatry Following  Recommend local wound care with hydrogel with silver and a dry dressing daily. Weight-bear on the right lower extremity as tolerated.    Recommend follow-up with me in the office within 1 week after discharge to follow-up on her pedal wounds.   Continue local Wound Care  History of CVA (cerebrovascular accident)  History of prior strokes, initially found to have evidence of chronic infarcts on CT head in December 2024  Suspect possibly related to medication noncompliance, poorly controlled HTN and T2DM  Echo: EF 60%, normal systolic and diastolic function. No PFO with bubble study.  MRI brain this admission with no acute stroke  Updated lipid panel: , , HDL 50, .  Continue home baby aspirin. Xarelto on hold pending Angiogram (5/30)  C/w Increased atorvastatin 80 mg daily  Essential hypertension  Hypertensive urgency on admission, BP up to 200/110s  Reports noncompliance  with home antihypertensives  BP reviewed, HTN improving  Continue home lisinopril 20 mg daily, prazosin 1 mg qHS  History of substance use  History of cocaine use, patient admits to smoking crack cocaine 4 days PTA & marijuana 2 days PTA  UDS: + Cocaine and THC. No signs of withdrawal.  Strongly encouraged cessation of substance use  Class 3 severe obesity due to excess calories without serious comorbidity with body mass index (BMI) of 45.0 to 49.9 in adult  BMI 41.57, affects all aspects of care  Strongly encourage dietary/lifestyle modifications  COPD (chronic obstructive pulmonary disease)/asthma  Not in acute exacerbation, currently stable on room air  Continue daily maintenance inhaler, PRN albuterol  S/P AKA (above knee amputation) unilateral, left (HCC)  S/p left AKA on 3/29/2023, wheelchair-bound at baseline  Tobacco abuse  Nicotine patches, smoking cessation education    VTE Pharmacologic Prophylaxis: VTE Score: 3 Moderate Risk (Score 3-4) - Pharmacological DVT Prophylaxis Contraindicated. Sequential Compression Devices Ordered.    Mobility:   Basic Mobility Inpatient Raw Score: 21  JH-HLM Goal: 6: Walk 10 steps or more  JH-HLM Achieved: 4: Move to chair/commode  JH-HLM Goal NOT achieved. Continue with multidisciplinary rounding and encourage appropriate mobility to improve upon JH-HLM goals.    Patient Centered Rounds: I performed bedside rounds with nursing staff today.   Discussions with Specialists or Other Care Team Provider: Orthopedic Surgery, CM    Education and Discussions with Family / Patient: Updated  (significant other) at bedside.    Current Length of Stay: 2 day(s)  Current Patient Status: Inpatient   Certification Statement: The patient will continue to require additional inpatient hospital stay due to persistent LUE pain/Numbness requiring further evaluation with MRI and RLE wounds and worsening NIKKI's requiring Vascular Surgery evaluation and intervention  Discharge Plan:  Anticipate discharge in 48-72 hrs to discharge location to be determined pending rehab evaluations.    Code Status: Level 1 - Full Code    Subjective   Patient complaining of persistent LUE pain and numbness that comes and slightly improved but never really goes away. Patient denies any chest pain, shortness of breath, abdominal pain, nausea or vomiting.     Objective :  Temp:  [97.7 °F (36.5 °C)-99.1 °F (37.3 °C)] 99.1 °F (37.3 °C)  HR:  [76-97] 83  BP: ()/(51-86) 144/86  Resp:  [16-20] 20  SpO2:  [97 %-99 %] 98 %  O2 Device: None (Room air)    Body mass index is 41.57 kg/m².     Input and Output Summary (last 24 hours):     Intake/Output Summary (Last 24 hours) at 5/29/2025 1027  Last data filed at 5/29/2025 0601  Gross per 24 hour   Intake 1590 ml   Output 1280 ml   Net 310 ml       Physical Exam  Vitals and nursing note reviewed.   Constitutional:       General: She is not in acute distress.  HENT:      Head: Normocephalic.      Nose: Nose normal. No congestion.      Mouth/Throat:      Mouth: Mucous membranes are moist.      Pharynx: Oropharynx is clear.     Cardiovascular:      Rate and Rhythm: Normal rate and regular rhythm.      Heart sounds: Normal heart sounds. No murmur heard.  Pulmonary:      Effort: Pulmonary effort is normal. No respiratory distress.      Breath sounds: Normal breath sounds.   Abdominal:      General: There is no distension.      Palpations: Abdomen is soft.      Tenderness: There is no abdominal tenderness.     Musculoskeletal:      Left forearm: Tenderness present.      Left hand: Decreased strength.      Cervical back: Normal range of motion.      Comments: L forearm/Hand Numbness/Tingling      Amputation Left Lower Extremity: Left leg is amputated above knee.     Skin:     General: Skin is warm and dry.      Capillary Refill: Capillary refill takes less than 2 seconds.     Neurological:      Mental Status: She is alert and oriented to person, place, and time. Mental status is  at baseline.     Psychiatric:         Mood and Affect: Mood is anxious.         Speech: Speech normal.         Behavior: Behavior is cooperative.           Lines/Drains:              Lab Results: I have reviewed the following results:   Results from last 7 days   Lab Units 05/28/25  0521   WBC Thousand/uL 8.87   HEMOGLOBIN g/dL 13.4   HEMATOCRIT % 39.3   PLATELETS Thousands/uL 311   SEGS PCT % 54   LYMPHO PCT % 34   MONO PCT % 5   EOS PCT % 5     Results from last 7 days   Lab Units 05/28/25  0521 05/27/25  0532   SODIUM mmol/L 135 134*   POTASSIUM mmol/L 3.9 3.5   CHLORIDE mmol/L 101 99   CO2 mmol/L 28 27   BUN mg/dL 17 13   CREATININE mg/dL 0.52* 0.41*   ANION GAP mmol/L 6 8   CALCIUM mg/dL 8.2* 8.5   ALBUMIN g/dL  --  3.5   TOTAL BILIRUBIN mg/dL  --  0.25   ALK PHOS U/L  --  108*   ALT U/L  --  15   AST U/L  --  10*   GLUCOSE RANDOM mg/dL 235* 205*     Results from last 7 days   Lab Units 05/28/25  0521   INR  1.08     Results from last 7 days   Lab Units 05/29/25  0702 05/28/25  2037 05/28/25  1614 05/28/25  1131 05/28/25  0703 05/27/25  2003 05/27/25  1621 05/27/25  1125 05/27/25  0717 05/26/25  2019 05/26/25  1609 05/26/25  1100   POC GLUCOSE mg/dl 275* 169* 334* 222* 242* 346* 175* 208* 182* 222* 362* 394*     Results from last 7 days   Lab Units 05/26/25  0321   HEMOGLOBIN A1C % 13.3*     Results from last 7 days   Lab Units 05/26/25  0321   LACTIC ACID mmol/L 1.5       Recent Cultures (last 7 days):         Imaging Results Review: I reviewed radiology reports from this admission including: MRI brain and xray(s).  Other Study Results Review: No additional pertinent studies reviewed.    Last 24 Hours Medication List:     Current Facility-Administered Medications:     acetaminophen (TYLENOL) tablet 975 mg, Q8H ABDULKADIR    albuterol (PROVENTIL HFA,VENTOLIN HFA) inhaler 1 puff, Q4H PRN    Artificial Tears Op Soln 2 drop, Q6H PRN    aspirin chewable tablet 81 mg, Daily    atorvastatin (LIPITOR) tablet 80 mg, Daily  With Dinner    cyclobenzaprine (FLEXERIL) tablet 10 mg, TID    DULoxetine (CYMBALTA) delayed release capsule 60 mg, Daily    Fluticasone Furoate-Vilanterol 100-25 mcg/actuation 1 puff, Daily    gabapentin (NEURONTIN) capsule 100 mg, TID    hydrALAZINE (APRESOLINE) injection 5 mg, Q6H PRN    hydrOXYzine HCL (ATARAX) tablet 25 mg, BID PRN    insulin glargine (LANTUS) subcutaneous injection 30 Units 0.3 mL, Q12H ABDULKADIR    insulin lispro (HumALOG/ADMELOG) 100 units/mL subcutaneous injection 1-5 Units, HS    insulin lispro (HumALOG/ADMELOG) 100 units/mL subcutaneous injection 1-6 Units, TID AC **AND** Fingerstick Glucose (POCT), TID AC    lisinopril (ZESTRIL) tablet 20 mg, Daily    magnesium Oxide (MAG-OX) tablet 400 mg, BID    morphine injection 4 mg, Q4H PRN    nicotine (NICODERM CQ) 21 mg/24 hr TD 24 hr patch 1 patch, Daily    oxyCODONE (ROXICODONE) IR tablet 2.5 mg, Q6H PRN    oxyCODONE (ROXICODONE) IR tablet 5 mg, Q6H PRN    prazosin (MINIPRESS) capsule 1 mg, HS    [Held by provider] rivaroxaban (XARELTO) tablet 20 mg, Daily With Breakfast    Administrative Statements   Today, Patient Was Seen By: EMILE Guerrero      **Please Note: This note may have been constructed using a voice recognition system.**

## 2025-05-29 NOTE — ASSESSMENT & PLAN NOTE
Patient presented with distal left arm pain/numbness/weakness since 2 AM on 5/26. Patient reports having numbness on bilateral 5th fingers for the past month, however weakness & pain from left elbow down to left hand is new.  Initial concern for stroke given prior hx. not a candidate for TNK, reports taking her Xarelto at around 2:30 AM PTA.  CTA head/neck: No acute intracranial hemorrhage, sequela of old infarction left thalamus, left caudate and left ganglial capsular region. No large vessel arterial occlusion, significant stenosis or focal aneurysm.  MRI brain showed no evidence of acute infarction  Received aspirin 325 mg in ED. Restarted baby ASA, Xarelto & atorvastatin at higher dose.  MRI Cervical Spine: Ordered  Follow Up Vitamin B12 level  Neurology signed off, symptoms likely due to neuropathy.  Orthopedic Surgery signed off,   Recommend MRI Cervical Spine. Consider neuro evaluation given possible CNS etiology.   No acute ortho intervention. Consider EMG/NCS as an outpatient   Continue Pain management   IV Toradol, Tylenol, switch Robaxin to Flexeril, gabapentin; PRN oxycodone and IV morphine.  Consider neuromuscular evaluation in 4-6 weeks

## 2025-05-29 NOTE — PLAN OF CARE
Problem: PAIN - ADULT  Goal: Verbalizes/displays adequate comfort level or baseline comfort level  Description: Interventions:  - Encourage patient to monitor pain and request assistance  - Assess pain using appropriate pain scale  - Administer analgesics as ordered based on type and severity of pain and evaluate response  - Implement non-pharmacological measures as appropriate and evaluate response  - Consider cultural and social influences on pain and pain management  - Notify physician/advanced practitioner if interventions unsuccessful or patient reports new pain  - Educate patient/family on pain management process including their role and importance of  reporting pain   - Provide non-pharmacologic/complimentary pain relief interventions  Outcome: Progressing     Problem: SAFETY ADULT  Goal: Patient will remain free of falls  Description: INTERVENTIONS:  - Educate patient/family on patient safety including physical limitations  - Instruct patient to call for assistance with activity   - Consider consulting OT/PT to assist with strengthening/mobility based on AM PAC & JH-HLM score  - Consult OT/PT to assist with strengthening/mobility   - Keep Call bell within reach  - Keep bed low and locked with side rails adjusted as appropriate  - Keep care items and personal belongings within reach  - Initiate and maintain comfort rounds  - Make Fall Risk Sign visible to staff  - Offer Toileting every 2 Hours, in advance of need  - Initiate/Maintain bed alarm  - Obtain necessary fall risk management equipment: floor cushion  - Apply yellow socks and bracelet for high fall risk patients  - Consider moving patient to room near nurses station  Outcome: Progressing     Problem: DISCHARGE PLANNING  Goal: Discharge to home or other facility with appropriate resources  Description: INTERVENTIONS:  - Identify barriers to discharge w/patient and caregiver  - Arrange for needed discharge resources and transportation as  appropriate  - Identify discharge learning needs (meds, wound care, etc.)  - Arrange for interpretive services to assist at discharge as needed  - Refer to Case Management Department for coordinating discharge planning if the patient needs post-hospital services based on physician/advanced practitioner order or complex needs related to functional status, cognitive ability, or social support system  Outcome: Progressing     Problem: Knowledge Deficit  Goal: Patient/family/caregiver demonstrates understanding of disease process, treatment plan, medications, and discharge instructions  Description: Complete learning assessment and assess knowledge base.  Interventions:  - Provide teaching at level of understanding  - Provide teaching via preferred learning methods  Outcome: Progressing     Problem: Neurological Deficit  Goal: Neurological status is stable or improving  Description: Interventions:  - Monitor and assess patient's level of consciousness, motor function, sensory function, and level of assistance needed for ADLs.   - Monitor and report changes from baseline. Collaborate with interdisciplinary team to initiate plan and implement interventions as ordered.   - Provide and maintain a safe environment.  - Consider seizure precautions.  - Consider fall precautions.  - Consider aspiration precautions.  - Consider bleeding precautions.  Outcome: Progressing     Problem: Activity Intolerance/Impaired Mobility  Goal: Mobility/activity is maintained at optimum level for patient  Description: Interventions:  - Assess and monitor patient  barriers to mobility and need for assistive/adaptive devices.  - Assess patient's emotional response to limitations.  - Collaborate with interdisciplinary team and initiate plans and interventions as ordered.  - Encourage independent activity per ability.  - Maintain proper body alignment.  - Perform active/passive rom as tolerated/ordered.  - Plan activities to conserve energy.  -  Turn patient as appropriate  Outcome: Progressing

## 2025-05-29 NOTE — PLAN OF CARE
Problem: SAFETY ADULT  Goal: Patient will remain free of falls  Description: INTERVENTIONS:  - Educate patient/family on patient safety including physical limitations  - Instruct patient to call for assistance with activity   - Consider consulting OT/PT to assist with strengthening/mobility based on AM PAC & JH-HLM score  - Consult OT/PT to assist with strengthening/mobility   - Keep Call bell within reach  - Keep bed low and locked with side rails adjusted as appropriate  - Keep care items and personal belongings within reach  - Initiate and maintain comfort rounds  - Make Fall Risk Sign visible to staff  - Offer Toileting every 2 Hours, in advance of need  - Initiate/Maintain bed alarm  - Obtain necessary fall risk management equipment: floor cushion  - Apply yellow socks and bracelet for high fall risk patients  - Consider moving patient to room near nurses station  5/29/2025 0544 by Pooja Trimble RN  Outcome: Progressing  5/28/2025 2147 by Pooja Trimble RN  Outcome: Progressing     Problem: PAIN - ADULT  Goal: Verbalizes/displays adequate comfort level or baseline comfort level  Description: Interventions:  - Encourage patient to monitor pain and request assistance  - Assess pain using appropriate pain scale  - Administer analgesics as ordered based on type and severity of pain and evaluate response  - Implement non-pharmacological measures as appropriate and evaluate response  - Consider cultural and social influences on pain and pain management  - Notify physician/advanced practitioner if interventions unsuccessful or patient reports new pain  - Educate patient/family on pain management process including their role and importance of  reporting pain   - Provide non-pharmacologic/complimentary pain relief interventions  5/29/2025 0544 by Pooja Trimble RN  Outcome: Progressing  5/28/2025 2147 by Pooja Trimble RN  Outcome: Progressing     Problem: Neurological Deficit  Goal: Neurological status  is stable or improving  Description: Interventions:  - Monitor and assess patient's level of consciousness, motor function, sensory function, and level of assistance needed for ADLs.   - Monitor and report changes from baseline. Collaborate with interdisciplinary team to initiate plan and implement interventions as ordered.   - Provide and maintain a safe environment.  - Consider seizure precautions.  - Consider fall precautions.  - Consider aspiration precautions.  - Consider bleeding precautions.  5/29/2025 0544 by Pooja Trimble RN  Outcome: Progressing  5/28/2025 2147 by Pooja Trimble RN  Outcome: Progressing

## 2025-05-29 NOTE — ASSESSMENT & PLAN NOTE
History of prior strokes, initially found to have evidence of chronic infarcts on CT head in December 2024  Suspect possibly related to medication noncompliance, poorly controlled HTN and T2DM  Echo: EF 60%, normal systolic and diastolic function. No PFO with bubble study.  MRI brain this admission with no acute stroke  Updated lipid panel: , , HDL 50, .  Continue home baby aspirin. Xarelto on hold pending Angiogram (5/30)  C/w Increased atorvastatin 80 mg daily

## 2025-05-29 NOTE — ASSESSMENT & PLAN NOTE
Hypertensive urgency on admission, BP up to 200/110s  Reports noncompliance with home antihypertensives  BP reviewed, HTN improving  Continue home lisinopril 20 mg daily, prazosin 1 mg qHS

## 2025-05-29 NOTE — ASSESSMENT & PLAN NOTE
Wound on right lateral ankle and right first metatarsal base POA, patient reports sustaining right ankle wound from scraping on her wheelchair but has had poor healing  XR right foot/ankle with no acute osseous abnormalities  Does not appear infectious  Podiatry Following  Recommend local wound care with hydrogel with silver and a dry dressing daily. Weight-bear on the right lower extremity as tolerated.    Recommend follow-up with me in the office within 1 week after discharge to follow-up on her pedal wounds.   Continue local Wound Care

## 2025-05-29 NOTE — ASSESSMENT & PLAN NOTE
Lab Results   Component Value Date    HGBA1C 13.3 (H) 05/26/2025     Recent Labs     05/28/25  1131 05/28/25  1614 05/28/25 2037 05/29/25  0702   POCGLU 222* 334* 169* 275*     Blood Sugar Average: Last 72 hrs:  (P) 277    Poorly controlled T2DM per most recent HgbA1c 11. Patient admits to noncompliance with insulin/medications, notes difficulty with affording and running out of supplies for glucometer & recently applied for Medicaid.  Updated HgbA1c 13.3, very poorly controlled T2DM.  Prior home regimen: Lantus 30U BID, Humalog SSI 4-16U TID, Trulicity weekly injections.   Pt with persistent Hyperglycemia  Continue Lantus 30U BID  Continue SSI coverage with Accu-Cheks ACHS  Carb controlled diet, hypoglycemia protocol  likely will need prescription & price check for diabetic supplies prior to discharge

## 2025-05-30 ENCOUNTER — DOCUMENTATION (OUTPATIENT)
Dept: ADMINISTRATIVE | Facility: OTHER | Age: 40
End: 2025-05-30

## 2025-05-30 ENCOUNTER — APPOINTMENT (INPATIENT)
Dept: RADIOLOGY | Facility: HOSPITAL | Age: 40
DRG: 038 | End: 2025-05-30
Payer: COMMERCIAL

## 2025-05-30 LAB
ALBUMIN SERPL BCG-MCNC: 3.1 G/DL (ref 3.5–5)
ALP SERPL-CCNC: 93 U/L (ref 34–104)
ALT SERPL W P-5'-P-CCNC: 16 U/L (ref 7–52)
ANION GAP SERPL CALCULATED.3IONS-SCNC: 6 MMOL/L (ref 4–13)
AST SERPL W P-5'-P-CCNC: 11 U/L (ref 13–39)
BASOPHILS # BLD AUTO: 0.03 THOUSANDS/ÂΜL (ref 0–0.1)
BASOPHILS NFR BLD AUTO: 1 % (ref 0–1)
BILIRUB SERPL-MCNC: 0.25 MG/DL (ref 0.2–1)
BUN SERPL-MCNC: 22 MG/DL (ref 5–25)
CALCIUM ALBUM COR SERPL-MCNC: 9 MG/DL (ref 8.3–10.1)
CALCIUM SERPL-MCNC: 8.3 MG/DL (ref 8.4–10.2)
CHLORIDE SERPL-SCNC: 104 MMOL/L (ref 96–108)
CO2 SERPL-SCNC: 28 MMOL/L (ref 21–32)
CREAT SERPL-MCNC: 0.47 MG/DL (ref 0.6–1.3)
EOSINOPHIL # BLD AUTO: 0.37 THOUSAND/ÂΜL (ref 0–0.61)
EOSINOPHIL NFR BLD AUTO: 6 % (ref 0–6)
ERYTHROCYTE [DISTWIDTH] IN BLOOD BY AUTOMATED COUNT: 15 % (ref 11.6–15.1)
GFR SERPL CREATININE-BSD FRML MDRD: 125 ML/MIN/1.73SQ M
GLUCOSE SERPL-MCNC: 125 MG/DL (ref 65–140)
GLUCOSE SERPL-MCNC: 225 MG/DL (ref 65–140)
GLUCOSE SERPL-MCNC: 234 MG/DL (ref 65–140)
GLUCOSE SERPL-MCNC: 238 MG/DL (ref 65–140)
HCT VFR BLD AUTO: 34.3 % (ref 34.8–46.1)
HGB BLD-MCNC: 11.9 G/DL (ref 11.5–15.4)
IMM GRANULOCYTES # BLD AUTO: 0.02 THOUSAND/UL (ref 0–0.2)
IMM GRANULOCYTES NFR BLD AUTO: 0 % (ref 0–2)
INR PPP: 0.95 (ref 0.85–1.19)
LYMPHOCYTES # BLD AUTO: 2.57 THOUSANDS/ÂΜL (ref 0.6–4.47)
LYMPHOCYTES NFR BLD AUTO: 39 % (ref 14–44)
MCH RBC QN AUTO: 26.3 PG (ref 26.8–34.3)
MCHC RBC AUTO-ENTMCNC: 34.7 G/DL (ref 31.4–37.4)
MCV RBC AUTO: 76 FL (ref 82–98)
MONOCYTES # BLD AUTO: 0.37 THOUSAND/ÂΜL (ref 0.17–1.22)
MONOCYTES NFR BLD AUTO: 6 % (ref 4–12)
NEUTROPHILS # BLD AUTO: 3.28 THOUSANDS/ÂΜL (ref 1.85–7.62)
NEUTS SEG NFR BLD AUTO: 48 % (ref 43–75)
NRBC BLD AUTO-RTO: 0 /100 WBCS
PLATELET # BLD AUTO: 326 THOUSANDS/UL (ref 149–390)
PMV BLD AUTO: 10.4 FL (ref 8.9–12.7)
POTASSIUM SERPL-SCNC: 4.2 MMOL/L (ref 3.5–5.3)
PROT SERPL-MCNC: 5.5 G/DL (ref 6.4–8.4)
PROTHROMBIN TIME: 13.1 SECONDS (ref 12.3–15)
RBC # BLD AUTO: 4.52 MILLION/UL (ref 3.81–5.12)
SODIUM SERPL-SCNC: 138 MMOL/L (ref 135–147)
WBC # BLD AUTO: 6.64 THOUSAND/UL (ref 4.31–10.16)

## 2025-05-30 PROCEDURE — 047M3Z1 DILATION OF RIGHT POPLITEAL ARTERY USING DRUG-COATED BALLOON, PERCUTANEOUS APPROACH: ICD-10-PCS | Performed by: STUDENT IN AN ORGANIZED HEALTH CARE EDUCATION/TRAINING PROGRAM

## 2025-05-30 PROCEDURE — 75710 ARTERY X-RAYS ARM/LEG: CPT | Performed by: STUDENT IN AN ORGANIZED HEALTH CARE EDUCATION/TRAINING PROGRAM

## 2025-05-30 PROCEDURE — 82948 REAGENT STRIP/BLOOD GLUCOSE: CPT

## 2025-05-30 PROCEDURE — C1769 GUIDE WIRE: HCPCS

## 2025-05-30 PROCEDURE — C2623 CATH, TRANSLUMIN, DRUG-COAT: HCPCS

## 2025-05-30 PROCEDURE — C1894 INTRO/SHEATH, NON-LASER: HCPCS

## 2025-05-30 PROCEDURE — 75625 CONTRAST EXAM ABDOMINL AORTA: CPT

## 2025-05-30 PROCEDURE — 75710 ARTERY X-RAYS ARM/LEG: CPT

## 2025-05-30 PROCEDURE — 99152 MOD SED SAME PHYS/QHP 5/>YRS: CPT | Performed by: STUDENT IN AN ORGANIZED HEALTH CARE EDUCATION/TRAINING PROGRAM

## 2025-05-30 PROCEDURE — C1725 CATH, TRANSLUMIN NON-LASER: HCPCS

## 2025-05-30 PROCEDURE — 99152 MOD SED SAME PHYS/QHP 5/>YRS: CPT

## 2025-05-30 PROCEDURE — 37246 TRLUML BALO ANGIOP 1ST ART: CPT

## 2025-05-30 PROCEDURE — 99233 SBSQ HOSP IP/OBS HIGH 50: CPT

## 2025-05-30 PROCEDURE — 047K3Z1 DILATION OF RIGHT FEMORAL ARTERY USING DRUG-COATED BALLOON, PERCUTANEOUS APPROACH: ICD-10-PCS | Performed by: STUDENT IN AN ORGANIZED HEALTH CARE EDUCATION/TRAINING PROGRAM

## 2025-05-30 PROCEDURE — C1887 CATHETER, GUIDING: HCPCS

## 2025-05-30 PROCEDURE — 99153 MOD SED SAME PHYS/QHP EA: CPT

## 2025-05-30 PROCEDURE — 3E03317 INTRODUCTION OF OTHER THROMBOLYTIC INTO PERIPHERAL VEIN, PERCUTANEOUS APPROACH: ICD-10-PCS | Performed by: STUDENT IN AN ORGANIZED HEALTH CARE EDUCATION/TRAINING PROGRAM

## 2025-05-30 PROCEDURE — 37224 PR REVSC OPN/PRG FEM/POP W/ANGIOPLASTY UNI: CPT | Performed by: STUDENT IN AN ORGANIZED HEALTH CARE EDUCATION/TRAINING PROGRAM

## 2025-05-30 PROCEDURE — 85610 PROTHROMBIN TIME: CPT | Performed by: PHYSICIAN ASSISTANT

## 2025-05-30 PROCEDURE — C1760 CLOSURE DEV, VASC: HCPCS

## 2025-05-30 PROCEDURE — 80053 COMPREHEN METABOLIC PANEL: CPT

## 2025-05-30 PROCEDURE — 76937 US GUIDE VASCULAR ACCESS: CPT | Performed by: STUDENT IN AN ORGANIZED HEALTH CARE EDUCATION/TRAINING PROGRAM

## 2025-05-30 PROCEDURE — NC001 PR NO CHARGE: Performed by: STUDENT IN AN ORGANIZED HEALTH CARE EDUCATION/TRAINING PROGRAM

## 2025-05-30 PROCEDURE — 85025 COMPLETE CBC W/AUTO DIFF WBC: CPT

## 2025-05-30 PROCEDURE — 76937 US GUIDE VASCULAR ACCESS: CPT

## 2025-05-30 RX ORDER — HEPARIN SODIUM 1000 [USP'U]/ML
INJECTION, SOLUTION INTRAVENOUS; SUBCUTANEOUS AS NEEDED
Status: DISCONTINUED | OUTPATIENT
Start: 2025-05-30 | End: 2025-06-02 | Stop reason: HOSPADM

## 2025-05-30 RX ORDER — MIDAZOLAM HYDROCHLORIDE 2 MG/2ML
INJECTION, SOLUTION INTRAMUSCULAR; INTRAVENOUS AS NEEDED
Status: DISCONTINUED | OUTPATIENT
Start: 2025-05-30 | End: 2025-06-02 | Stop reason: HOSPADM

## 2025-05-30 RX ORDER — DIPHENHYDRAMINE HYDROCHLORIDE 50 MG/ML
INJECTION, SOLUTION INTRAMUSCULAR; INTRAVENOUS AS NEEDED
Status: DISCONTINUED | OUTPATIENT
Start: 2025-05-30 | End: 2025-06-02 | Stop reason: HOSPADM

## 2025-05-30 RX ORDER — FENTANYL CITRATE 50 UG/ML
INJECTION, SOLUTION INTRAMUSCULAR; INTRAVENOUS AS NEEDED
Status: DISCONTINUED | OUTPATIENT
Start: 2025-05-30 | End: 2025-06-02 | Stop reason: HOSPADM

## 2025-05-30 RX ORDER — IODIXANOL 320 MG/ML
300 INJECTION, SOLUTION INTRAVASCULAR
Status: COMPLETED | OUTPATIENT
Start: 2025-05-30 | End: 2025-05-30

## 2025-05-30 RX ORDER — LIDOCAINE WITH 8.4% SOD BICARB 0.9%(10ML)
SYRINGE (ML) INJECTION AS NEEDED
Status: DISCONTINUED | OUTPATIENT
Start: 2025-05-30 | End: 2025-06-02 | Stop reason: HOSPADM

## 2025-05-30 RX ADMIN — CYCLOBENZAPRINE 10 MG: 10 TABLET, FILM COATED ORAL at 21:47

## 2025-05-30 RX ADMIN — FENTANYL CITRATE 50 MCG: 50 INJECTION, SOLUTION INTRAMUSCULAR; INTRAVENOUS at 08:22

## 2025-05-30 RX ADMIN — OXYCODONE 5 MG: 5 TABLET ORAL at 17:00

## 2025-05-30 RX ADMIN — FENTANYL CITRATE 25 MCG: 50 INJECTION, SOLUTION INTRAMUSCULAR; INTRAVENOUS at 08:44

## 2025-05-30 RX ADMIN — ATORVASTATIN CALCIUM 80 MG: 40 TABLET, FILM COATED ORAL at 17:00

## 2025-05-30 RX ADMIN — ALTEPLASE 4 MG: 2.2 INJECTION, POWDER, LYOPHILIZED, FOR SOLUTION INTRAVENOUS at 09:56

## 2025-05-30 RX ADMIN — IODIXANOL 243 ML: 320 INJECTION, SOLUTION INTRAVASCULAR at 10:13

## 2025-05-30 RX ADMIN — INSULIN LISPRO 2 UNITS: 100 INJECTION, SOLUTION INTRAVENOUS; SUBCUTANEOUS at 21:56

## 2025-05-30 RX ADMIN — MIDAZOLAM HYDROCHLORIDE 1 MG: 1 INJECTION, SOLUTION INTRAMUSCULAR; INTRAVENOUS at 08:22

## 2025-05-30 RX ADMIN — ACETAMINOPHEN 975 MG: 325 TABLET, FILM COATED ORAL at 21:47

## 2025-05-30 RX ADMIN — GABAPENTIN 100 MG: 100 CAPSULE ORAL at 21:47

## 2025-05-30 RX ADMIN — MIDAZOLAM HYDROCHLORIDE 0.5 MG: 1 INJECTION, SOLUTION INTRAMUSCULAR; INTRAVENOUS at 09:09

## 2025-05-30 RX ADMIN — HEPARIN SODIUM 4000 UNITS: 1000 INJECTION, SOLUTION INTRAVENOUS; SUBCUTANEOUS at 08:58

## 2025-05-30 RX ADMIN — FENTANYL CITRATE 25 MCG: 50 INJECTION, SOLUTION INTRAMUSCULAR; INTRAVENOUS at 09:09

## 2025-05-30 RX ADMIN — MIDAZOLAM HYDROCHLORIDE 0.5 MG: 1 INJECTION, SOLUTION INTRAMUSCULAR; INTRAVENOUS at 08:44

## 2025-05-30 RX ADMIN — FENTANYL CITRATE 50 MCG: 50 INJECTION, SOLUTION INTRAMUSCULAR; INTRAVENOUS at 08:34

## 2025-05-30 RX ADMIN — Medication 10 ML: at 08:34

## 2025-05-30 RX ADMIN — ASPIRIN 81 MG 81 MG: 81 TABLET ORAL at 17:00

## 2025-05-30 RX ADMIN — Medication 400 MG: at 17:01

## 2025-05-30 RX ADMIN — PRAZOSIN HYDROCHLORIDE 1 MG: 1 CAPSULE ORAL at 21:47

## 2025-05-30 RX ADMIN — DIPHENHYDRAMINE HYDROCHLORIDE 25 MG: 50 INJECTION, SOLUTION INTRAMUSCULAR; INTRAVENOUS at 09:14

## 2025-05-30 RX ADMIN — GABAPENTIN 100 MG: 100 CAPSULE ORAL at 17:00

## 2025-05-30 RX ADMIN — LISINOPRIL 20 MG: 20 TABLET ORAL at 17:00

## 2025-05-30 RX ADMIN — INSULIN LISPRO 3 UNITS: 100 INJECTION, SOLUTION INTRAVENOUS; SUBCUTANEOUS at 16:49

## 2025-05-30 RX ADMIN — ACETAMINOPHEN 975 MG: 325 TABLET, FILM COATED ORAL at 05:30

## 2025-05-30 RX ADMIN — DULOXETINE HYDROCHLORIDE 60 MG: 60 CAPSULE, DELAYED RELEASE ORAL at 17:01

## 2025-05-30 RX ADMIN — OXYCODONE 5 MG: 5 TABLET ORAL at 23:42

## 2025-05-30 RX ADMIN — FENTANYL CITRATE 25 MCG: 50 INJECTION, SOLUTION INTRAMUSCULAR; INTRAVENOUS at 10:00

## 2025-05-30 RX ADMIN — FENTANYL CITRATE 25 MCG: 50 INJECTION, SOLUTION INTRAMUSCULAR; INTRAVENOUS at 09:28

## 2025-05-30 RX ADMIN — MIDAZOLAM HYDROCHLORIDE 1 MG: 1 INJECTION, SOLUTION INTRAMUSCULAR; INTRAVENOUS at 08:34

## 2025-05-30 RX ADMIN — NICOTINE 1 PATCH: 21 PATCH, EXTENDED RELEASE TRANSDERMAL at 17:07

## 2025-05-30 RX ADMIN — CYCLOBENZAPRINE 10 MG: 10 TABLET, FILM COATED ORAL at 17:00

## 2025-05-30 RX ADMIN — INSULIN GLARGINE 30 UNITS: 100 INJECTION, SOLUTION SUBCUTANEOUS at 21:47

## 2025-05-30 RX ADMIN — MIDAZOLAM HYDROCHLORIDE 0.5 MG: 1 INJECTION, SOLUTION INTRAMUSCULAR; INTRAVENOUS at 08:58

## 2025-05-30 RX ADMIN — MORPHINE SULFATE 4 MG: 4 INJECTION INTRAVENOUS at 19:29

## 2025-05-30 RX ADMIN — OXYCODONE 5 MG: 5 TABLET ORAL at 03:07

## 2025-05-30 NOTE — ASSESSMENT & PLAN NOTE
"History of PAD, known CLTI s/p left fem-BK pop bypass which subsequently occluded; s/p L AKA for nonsalvageable LLE in 2023  Last LEAD (3/21/25) revealed diffuse disease, significant decrease in right NIKKI now in severe range at 0.57  Vascular surgery consulted,   [facility] transfer to [facility] for angiogram  w/ intervention (5/30):  \"RLE arteriogram showed multifocal critical stenosis involving the entirety of the SFA and popliteal.  Three-vessel tibial runoff with small vessel foot arteriopathy and incomplete pedal plantar arch.  4 and 5 mm DCB in the entirety of the SFA and P1 segment.  Small amount of distal embolization into the peroneal and distal PT treated with tPA infusion\"  Vascular recommendations, Dr Pelayo: Restart Xarelto and obtain ABIs while inpatient due to poor follow-up  Vas NIKKI  and waveform ordered and Xarelto restarted  Continue NV checks  "

## 2025-05-30 NOTE — ASSESSMENT & PLAN NOTE
Lab Results   Component Value Date    HGBA1C 13.3 (H) 05/26/2025     Recent Labs     05/29/25  1632 05/29/25  2112 05/30/25  0007 05/30/25  1643   POCGLU 276* 255* 238* 234*     Blood Sugar Average: Last 72 hrs:  (P) [id number]    Poorly controlled T2DM per most recent HgbA1c 11. Patient admits to noncompliance with insulin/medications, notes difficulty with affording and running out of supplies for glucometer & recently applied for Medicaid.  Updated HgbA1c 13.3, very poorly controlled T2DM.  Prior home regimen: Lantus 30U BID, Humalog SSI 4-16U TID, Trulicity weekly injections.   Pt with persistent Hyperglycemia  Continue Lantus 30U BID  Consider adding Humalog 5 units TID with meals  Continue SSI coverage with Accu-Cheks ACHS  Carb controlled diet, hypoglycemia protocol  Encourage better diabetic control and diabetic medication compliance  likely will need prescription & price check for diabetic supplies prior to discharge

## 2025-05-30 NOTE — PROGRESS NOTES
Progress Note - Hospitalist   Name: Tony Roberto 39 y.o. female I MRN: [id number]  Unit/Bed#: -01 I Date of Admission: 5/26/2025   Date of Service: 5/30/2025 I Hospital Day: 3    Assessment & Plan  Left hand pain  Patient presented with distal left arm pain/numbness/weakness since 2 AM on 5/26. Patient reports having numbness on bilateral 5th fingers for the past month, however weakness & pain from left elbow down to left hand is new.  Initial concern for stroke given prior hx. not a candidate for TNK, reports taking her Xarelto at around 2:30 AM PTA.  CTA head/neck: No acute intracranial hemorrhage, sequela of old infarction left thalamus, left caudate and left ganglial capsular region. No large vessel arterial occlusion, significant stenosis or focal aneurysm.  MRI brain showed no evidence of acute infarction  Received aspirin 325 mg in ED. Restarted baby ASA, Xarelto & atorvastatin at higher dose.  MRI Cervical Spine: No disc herniation, canal or foraminal stenosis.   Vitamin B12 level: 363  Neurology signed off, symptoms likely due to neuropathy.  Orthopedic Surgery signed off,   Consider neuro evaluation given possible CNS etiology.  No acute ortho intervention. Consider EMG/NCS as an outpatient   Continue Pain management   IV Toradol, Tylenol, switch Robaxin to Flexeril, gabapentin; PRN oxycodone and IV morphine.  Consider neuromuscular evaluation in 4-6 weeks  Type 2 diabetes mellitus with diabetic polyneuropathy, with long-term current use of insulin ([facility])  Lab Results   Component Value Date    HGBA1C 13.3 (H) 05/26/2025     Recent Labs     05/29/25  1632 05/29/25  2112 05/30/25  0007 05/30/25  1643   POCGLU 276* 255* 238* 234*     Blood Sugar Average: Last 72 hrs:  (P) [id number]    Poorly controlled T2DM per most recent HgbA1c 11. Patient admits to noncompliance with insulin/medications, notes difficulty with affording and running out of supplies for glucometer & recently applied for  "Medicaid.  Updated HgbA1c 13.3, very poorly controlled T2DM.  Prior home regimen: Lantus 30U BID, Humalog SSI 4-16U TID, Trulicity weekly injections.   Pt with persistent Hyperglycemia  Continue Lantus 30U BID  Consider adding Humalog 5 units TID with meals  Continue SSI coverage with Accu-Cheks ACHS  Carb controlled diet, hypoglycemia protocol  Encourage better diabetic control and diabetic medication compliance  likely will need prescription & price check for diabetic supplies prior to discharge  Peripheral arterial disease (HCC)  History of PAD, known CLTI s/p left fem-BK pop bypass which subsequently occluded; s/p L AKA for nonsalvageable LLE in 2023  Last LEAD (3/21/25) revealed diffuse disease, significant decrease in right NIKKI now in severe range at 0.57  Vascular surgery consulted,   raymond transfer to [facility] for angiogram  w/ intervention (5/30):  \"RLE arteriogram showed multifocal critical stenosis involving the entirety of the SFA and popliteal.  Three-vessel tibial runoff with small vessel foot arteriopathy and incomplete pedal plantar arch.  4 and 5 mm DCB in the entirety of the SFA and P1 segment.  Small amount of distal embolization into the peroneal and distal PT treated with tPA infusion\"  Vascular recommendations, Dr Pelayo: Restart Xarelto and obtain ABIs while inpatient due to poor follow-up  Vas NIKKI  and waveform ordered and Xarelto restarted  Continue NV checks  Wound of right foot  Wound on right lateral ankle and right first metatarsal base POA, patient reports sustaining right ankle wound from scraping on her wheelchair but has had poor healing  XR right foot/ankle with no acute osseous abnormalities  Does not appear infectious  Podiatry Following  Recommend local wound care with hydrogel with silver and a dry dressing daily. Weight-bear on the right lower extremity as tolerated.    Recommend follow-up with me in the office within 1 week after discharge to follow-up on her pedal wounds. "   Continue local Wound Care  History of CVA (cerebrovascular accident)  History of prior strokes, initially found to have evidence of chronic infarcts on CT head in December 2024  Suspect possibly related to medication noncompliance, poorly controlled HTN and T2DM  Echo: EF 60%, normal systolic and diastolic function. No PFO with bubble study.  MRI brain this admission with no acute stroke  Updated lipid panel: , , HDL 50, .  Continue home baby aspirin. Xarelto restarted per vascular recommendations  C/w Increased atorvastatin 80 mg daily  Essential hypertension  Hypertensive urgency on admission, BP up to 200/110s  Reports noncompliance with home antihypertensives  BP reviewed, HTN improving  Continue home lisinopril 20 mg daily, prazosin 1 mg qHS  History of substance use  History of cocaine use, patient admits to smoking crack cocaine 4 days PTA & marijuana 2 days PTA  UDS: + Cocaine and THC. No signs of withdrawal.  Strongly encouraged cessation of substance use  Class 3 severe obesity due to excess calories without serious comorbidity with body mass index (BMI) of 45.0 to 49.9 in adult  BMI 41.57, affects all aspects of care  Strongly encourage dietary/lifestyle modifications  COPD (chronic obstructive pulmonary disease)/asthma  Not in acute exacerbation, currently stable on room air  Continue daily maintenance inhaler, PRN albuterol  S/P AKA (above knee amputation) unilateral, left (HCC)  S/p left AKA on 3/29/2023, wheelchair-bound at baseline  Tobacco abuse  Nicotine patches, smoking cessation education    VTE Pharmacologic Prophylaxis: VTE Score: 3 Moderate Risk (Score 3-4) - Pharmacological DVT Prophylaxis Contraindicated. Sequential Compression Devices Ordered.    Mobility:   Basic Mobility Inpatient Raw Score: 21  JH-HLM Goal: 6: Walk 10 steps or more  JH-HLM Achieved: 4: Move to chair/commode  JH-HLM Goal NOT achieved. Continue with multidisciplinary rounding and encourage appropriate  mobility to improve upon -[facility] goals.    Patient Centered Rounds: I performed bedside rounds with nursing staff today.   Discussions with Specialists or Other Care Team Provider: CM    Education and Discussions with Family / Patient: Updated  (significant other) at bedside.    Current Length of Stay: 3 day(s)  Current Patient Status: Inpatient   Certification Statement: The patient will continue to require additional inpatient hospital stay due to left hand pain and uncontrolled diabetes requiring medication adjustment and PAD requiring close follow up post angiogram  Discharge Plan: Anticipate discharge in 24-48 hrs to home with home services.    Code Status: Level 1 - Full Code    Subjective   Patient in bed and wife present bedside.  Denies any chest pain,palpitation, headaches or dizziness &abdominal pain n/v/d.  Still endorses Left upper arm numbness and tingling.  States that she does have some discomfort in the right groin from angiogram.  The site is dry clean intact & soft.  Discussed results of the angiogram and recommendations from vascular.  Patient and wife state understanding that NIKKI will be performed while inpatient and will restart the Xarelto.  Objective :  Temp:  [97.6 °F (36.4 °C)-98.2 °F (36.8 °C)] 97.6 °F (36.4 °C)  HR:  [69-88] 77  BP: (113-182)/() 165/85  Resp:  [15-18] 18  SpO2:  [94 %-98 %] 98 %  O2 Device: None (Room air)  FiO2 (%):  [21-22] 21    Body mass index is 41.57 kg/m².     Input and Output Summary (last 24 hours):     Intake/Output Summary (Last 24 hours) at 5/30/2025 2003  Last data filed at 5/30/2025 1506  Gross per 24 hour   Intake 550 ml   Output 400 ml   Net 150 ml       Physical Exam  Vitals and nursing note reviewed.   Constitutional:       General: She is not in acute distress.     Appearance: Normal appearance. She is not toxic-appearing.   HENT:      Head: Normocephalic.     Cardiovascular:      Rate and Rhythm: Normal rate and regular rhythm.       Pulses:           Femoral pulses are 2+ on the right side and 2+ on the left side.       Dorsalis pedis pulses are 2+ on the left side.      Heart sounds: No murmur heard.     Comments: R AKA  Pulmonary:      Effort: Pulmonary effort is normal.      Breath sounds: Normal breath sounds. No wheezing, rhonchi or rales.   Abdominal:      General: Bowel sounds are normal.      Palpations: Abdomen is soft.      Tenderness: There is no abdominal tenderness. There is no rebound.     Musculoskeletal:         General: Deformity present.      Right lower leg: No edema.      Left lower leg: No edema.      Comments: L AKA     Skin:     General: Skin is warm and dry.      Comments: R groin puncture site, D/C/I      Neurological:      General: No focal deficit present.      Mental Status: She is alert. Mental status is at baseline.      Motor: Weakness present.      Comments: RUE numbness and tingling & pain   Psychiatric:         Mood and Affect: Mood normal.           Lines/Drains:              Lab Results: I have reviewed the following results:   Results from last 7 days   Lab Units 05/30/25  0523   WBC Thousand/uL 6.64   HEMOGLOBIN g/dL 11.9   HEMATOCRIT % 34.3*   PLATELETS Thousands/uL 326   SEGS PCT % 48   LYMPHO PCT % 39   MONO PCT % 6   EOS PCT % 6     Results from last 7 days   Lab Units 05/30/25  0523   SODIUM mmol/L 138   POTASSIUM mmol/L 4.2   CHLORIDE mmol/L 104   CO2 mmol/L 28   BUN mg/dL 22   CREATININE mg/dL 0.47*   ANION GAP mmol/L 6   CALCIUM mg/dL 8.3*   ALBUMIN g/dL 3.1*   TOTAL BILIRUBIN mg/dL 0.25   ALK PHOS U/L 93   ALT U/L 16   AST U/L 11*   GLUCOSE RANDOM mg/dL 125     Results from last 7 days   Lab Units 05/30/25  0523   INR  0.95     Results from last 7 days   Lab Units 05/30/25  1643 05/30/25  0007 05/29/25  2112 05/29/25  1632 05/29/25  1059 05/29/25  0702 05/28/25  2037 05/28/25  1614 05/28/25  1131 05/28/25  0703 05/27/25  2003 05/27/25  1621   POC GLUCOSE mg/dl 234* 238* 255* 276* 167* 275* 169*  334* 222* 242* 346* 175*     Results from last 7 days   Lab Units 05/26/25  0321   HEMOGLOBIN A1C % 13.3*     Results from last 7 days   Lab Units 05/26/25  0321   LACTIC ACID mmol/L 1.5       Recent Cultures (last 7 days):         Imaging Results Review: No pertinent imaging studies reviewed.  Other Study Results Review: No additional pertinent studies reviewed.    Last 24 Hours Medication List:     Current Facility-Administered Medications:     acetaminophen (TYLENOL) tablet 975 mg, Q8H ABDULKADIR    albuterol (PROVENTIL HFA,VENTOLIN HFA) inhaler 1 puff, Q4H PRN    alteplase (CATHFLO) injection, PRN    Artificial Tears Op Soln 2 drop, Q6H PRN    aspirin chewable tablet 81 mg, Daily    atorvastatin (LIPITOR) tablet 80 mg, Daily With Dinner    cyclobenzaprine (FLEXERIL) tablet 10 mg, TID    diphenhydrAMINE (BENADRYL) injection, PRN    DULoxetine (CYMBALTA) delayed release capsule 60 mg, Daily    fentaNYL injection, PRN    Fluticasone Furoate-Vilanterol 100-25 mcg/actuation 1 puff, Daily    gabapentin (NEURONTIN) capsule 100 mg, TID    heparin (porcine) injection, PRN    hydrALAZINE (APRESOLINE) injection 5 mg, Q6H PRN    hydrOXYzine HCL (ATARAX) tablet 25 mg, BID PRN    insulin glargine (LANTUS) subcutaneous injection 30 Units 0.3 mL, Q12H ABDULKADIR    insulin lispro (HumALOG/ADMELOG) 100 units/mL subcutaneous injection 1-5 Units, HS    insulin lispro (HumALOG/ADMELOG) 100 units/mL subcutaneous injection 1-6 Units, TID AC **AND** Fingerstick Glucose (POCT), TID AC    lidocaine 1% buffered, PRN    lisinopril (ZESTRIL) tablet 20 mg, Daily    magnesium Oxide (MAG-OX) tablet 400 mg, BID    midazolam (VERSED) injection, PRN    morphine injection 4 mg, Q4H PRN    nicotine (NICODERM CQ) 21 mg/24 hr TD 24 hr patch 1 patch, Daily    oxyCODONE (ROXICODONE) IR tablet 2.5 mg, Q6H PRN    oxyCODONE (ROXICODONE) IR tablet 5 mg, Q6H PRN    prazosin (MINIPRESS) capsule 1 mg, HS    rivaroxaban (XARELTO) tablet 20 mg, Daily With  Breakfast    Administrative Statements   Today, Patient Was Seen By: EMILE Monroe      **Please Note: This note may have been constructed using a voice recognition system.**

## 2025-05-30 NOTE — SEDATION DOCUMENTATION
Interventional Radiology Procedure Nursing Note    Procedure: Lower extrem angio + DCB angioplasty + IA TPA  Performing Provider: Dr. Montez    Access site: Left groin  Closure: Starclose  Bedrest start time: 1000 .    Medications administered:  Midazolam IV: 3.5 mg  Fentanyl IV: 200 mcg  Diphenhydramine IV: 25mg    Events:  Patient safely transferred back to stretcher with assistance. Patient tolerated procedure well. Report called to [facility] RN and transported to designated department.

## 2025-05-30 NOTE — DISCHARGE INSTRUCTIONS
ARTERIOGRAM    WHAT YOU SHOULD KNOW:   An angiogram is a procedure to look at arteries in your body. Arteries are the blood vessels that carry blood from your heart to your body.     AFTER YOU LEAVE:     Self-care:   Limit activity: Rest for the remainder of the day of your procedure.Have some one with you until the next morning. Keep your arm or leg straight as much as possible.Rest as much as possible, sitting lying or reclining. Walk only to go to the bathroom, to bed or to eat. If the angiogram catheter was put in your leg, use the stairs as little as possible. No driving for 24-48 hours. No heavy lifting, >10 lbs. Or strenuous activity for 48 hours.      Keep your wound clean and dry. Remove band aid/ dressing tomorrow. You may shower 24 hours after your procedure. Shower and wash groin area or wrist area gently with soap and water: beginning tomorrow. Rinse and pat Dry. Apply new water seal band aid. Repeat this process for 5 days.  If there is any drainage from the puncture site, you should put on a clean bandage. No Powders, creams, lotions or antibiotic ointments for 5 days.  No tub baths, hot tubs or swimming for 5 days.    Watch for bleeding and bruising: It is normal to have a bruise and soreness where the angiogram catheter went in.  Medication: If your angiogram was performed to treat blockages in your leg arteries, it is strongly recommended that you take both an antiplatelet medication (like aspirin or Plavix) to prevent clotting AND a statin drug (like Lipitor or Crestor), even if you have normal cholesterol. If these drugs are not ordered for you please contact either your Vascular Surgery office or the Interventional Radiology Dept during normal daytime working hours. See Interventional Radiology telephone numbers below.  You Should Have Follow up with the vascular surgeon   call [phone] with questions  Diet:   You may resume your regular diet, Sips of flat soda will help with mild  nausea.  Drink more liquids than usual for the next 24 hours      IMMEDIATELY Contact Interventional Radiology at [phone]  if any of the following occur:  If your bruise gets larger or if you notice any active bleeding. APPLY DIRECT PRESSURE TO THE BLEEDING SITE.   If you notice increased swelling or have increased pain at the puncture site   If you have any numbness or pain in the extremity of the puncture site   If that extremity seems cold or pale.    You have fever greater than 101  Persistent nausea or vomitting    Follow up with your primary healthcare provider  as directed: Write down your questions so you remember to ask them during your visits.                Moderate Sedation   WHAT YOU NEED TO KNOW:   Moderate sedation, or conscious sedation, is medicine used during procedures to help you feel relaxed and calm. You will be awake and able to follow directions without anxiety or pain. You will remember little to none of the procedure. You may feel tired, weak, or unsteady on your feet after you get sedation. You may also have trouble concentrating or short-term memory loss. These symptoms should go away in 24 hours or less.   DISCHARGE INSTRUCTIONS:   Call 911 or have someone else call for any of the following:   You have sudden trouble breathing.     You cannot be woken.  Seek care immediately if:   You have a severe headache or dizziness.     Your heart is beating faster than usual.  Contact your healthcare provider if:   You have a fever.     You have nausea or are vomiting for more than 8 hours after the procedure.      Your skin is itchy, swollen, or you have a rash.     You have questions or concerns about your condition or care.  Self-care:   Have someone stay with you for 24 hours. This person can drive you to errands and help you do things around the house. This person can also watch for problems.      Rest and do quiet activities for 24 hours. Do not exercise, ride a bike, or play sports.  Stand up slowly to prevent dizziness and falls. Take short walks around the house with another person. Slowly return to your usual activities the next day.      Do not drive or use dangerous machines or tools for 24 hours. You may injure yourself or others. Examples include a lawnmower, saw, or drill. Do not return to work for 24 hours if you use dangerous machines or tools for work.      Do not make important decisions for 24 hours. For example, do not sign important papers or invest money.      Drink liquids as directed. Liquids help flush the sedation medicine out of your body. Ask how much liquid to drink each day and which liquids are best for you.      Eat small, frequent meals to prevent nausea and vomiting. Start with clear liquids such as juice or broth. If you do not vomit after clear liquids, you can eat your usual foods.      Do not drink alcohol or take medicines that make you drowsy. This includes medicines that help you sleep and anxiety medicines. Ask your healthcare provider if it is safe for you to take pain medicine.  Follow up with your healthcare provider as directed: Write down your questions so you remember to ask them during your visits.   © 2017 [employer] Information is for End User's use only and may not be sold, redistributed or otherwise used for commercial purposes. All illustrations and images included in CareNotes® are the copyrighted property of [employer] or [employer].  The above information is an  only. It is not intended as medical advice for individual conditions or treatments. Talk to your doctor, nurse or pharmacist before following any medical regimen to see if it is safe and effective for you.

## 2025-05-30 NOTE — ASSESSMENT & PLAN NOTE
History of prior strokes, initially found to have evidence of chronic infarcts on CT head in December 2024  Suspect possibly related to medication noncompliance, poorly controlled HTN and T2DM  Echo: EF 60%, normal systolic and diastolic function. No PFO with bubble study.  MRI brain this admission with no acute stroke  Updated lipid panel: , , HDL 50, .  Continue home baby aspirin. Xarelto restarted per vascular recommendations  C/w Increased atorvastatin 80 mg daily

## 2025-05-30 NOTE — PLAN OF CARE
Problem: PAIN - ADULT  Goal: Verbalizes/displays adequate comfort level or baseline comfort level  Description: Interventions:  - Encourage patient to monitor pain and request assistance  - Assess pain using appropriate pain scale  - Administer analgesics as ordered based on type and severity of pain and evaluate response  - Implement non-pharmacological measures as appropriate and evaluate response  - Consider cultural and social influences on pain and pain management  - Notify physician/advanced practitioner if interventions unsuccessful or patient reports new pain  - Educate patient/family on pain management process including their role and importance of  reporting pain   - Provide non-pharmacologic/complimentary pain relief interventions  Outcome: Progressing     Problem: SAFETY ADULT  Goal: Patient will remain free of falls  Description: INTERVENTIONS:  - Educate patient/family on patient safety including physical limitations  - Instruct patient to call for assistance with activity   - Consider consulting OT/PT to assist with strengthening/mobility based on AM PAC & JH-HLM score  - Consult OT/PT to assist with strengthening/mobility   - Keep Call bell within reach  - Keep bed low and locked with side rails adjusted as appropriate  - Keep care items and personal belongings within reach  - Initiate and maintain comfort rounds  - Make Fall Risk Sign visible to staff  - Offer Toileting every 2 Hours, in advance of need  - Apply yellow socks and bracelet for high fall risk patients  - Consider moving patient to room near nurses station  Outcome: Progressing     Problem: DISCHARGE PLANNING  Goal: Discharge to home or other facility with appropriate resources  Description: INTERVENTIONS:  - Identify barriers to discharge w/patient and caregiver  - Arrange for needed discharge resources and transportation as appropriate  - Identify discharge learning needs (meds, wound care, etc.)  - Arrange for interpretive services  to assist at discharge as needed  - Refer to Case Management Department for coordinating discharge planning if the patient needs post-hospital services based on physician/advanced practitioner order or complex needs related to functional status, cognitive ability, or social support system  Outcome: Progressing     Problem: Knowledge Deficit  Goal: Patient/family/caregiver demonstrates understanding of disease process, treatment plan, medications, and discharge instructions  Description: Complete learning assessment and assess knowledge base.  Interventions:  - Provide teaching at level of understanding  - Provide teaching via preferred learning methods  Outcome: Progressing     Problem: Neurological Deficit  Goal: Neurological status is stable or improving  Description: Interventions:  - Monitor and assess patient's level of consciousness, motor function, sensory function, and level of assistance needed for ADLs.   - Monitor and report changes from baseline. Collaborate with interdisciplinary team to initiate plan and implement interventions as ordered.   - Provide and maintain a safe environment.  - Consider seizure precautions.  - Consider fall precautions.  - Consider aspiration precautions.  - Consider bleeding precautions.  5/30/2025 0531 by Pooja Trimble RN  Outcome: Progressing  5/29/2025 2036 by Pooja Trimble RN  Outcome: Progressing     Problem: Activity Intolerance/Impaired Mobility  Goal: Mobility/activity is maintained at optimum level for patient  Description: Interventions:  - Assess and monitor patient  barriers to mobility and need for assistive/adaptive devices.  - Assess patient's emotional response to limitations.  - Collaborate with interdisciplinary team and initiate plans and interventions as ordered.  - Encourage independent activity per ability.  - Maintain proper body alignment.  - Perform active/passive rom as tolerated/ordered.  - Plan activities to conserve energy.  - Turn patient  as appropriate  5/30/2025 0531 by Pooja Trimble RN  Outcome: Progressing  5/29/2025 2356 by Pooja Trimble RN  Outcome: Progressing     Problem: Communication Impairment  Goal: Ability to express needs and understand communication  Description: Assess patient's communication skills and ability to understand information.  Patient will demonstrate use of effective communication techniques, alternative methods of communication and understanding even if not able to speak.     - Encourage communication and provide alternate methods of communication as needed.  - Collaborate with case management/ for discharge needs.  - Include patient/family/caregiver in decisions related to communication.  5/30/2025 0531 by Pooja Trimble RN  Outcome: Progressing  5/29/2025 2356 by Pooja Trimble RN  Outcome: Progressing     Problem: Potential for Aspiration  Goal: Non-ventilated patient's risk of aspiration is minimized  Description: Assess and monitor vital signs, respiratory status, and labs (WBC).  Monitor for signs of aspiration (tachypnea, cough, rales, wheezing, cyanosis, fever).    - Assess and monitor patient's ability to swallow.  - Place patient up in chair to eat if possible.  - HOB up at 90 degrees to eat if unable to get patient up into chair.  - Supervise patient during oral intake.   - Instruct patient/ family to take small bites.  - Instruct patient/ family to take small single sips when taking liquids.  - Follow patient-specific strategies generated by speech pathologist.  Outcome: Progressing     Problem: Nutrition  Goal: Nutrition/Hydration status is improving  Description: Monitor and assess patient's nutrition/hydration status for malnutrition (ex- brittle hair, bruises, dry skin, pale skin and conjunctiva, muscle wasting, smooth red tongue, and disorientation). Collaborate with interdisciplinary team and initiate plan and interventions as ordered.  Monitor patient's weight and dietary  intake as ordered or per policy. Utilize nutrition screening tool and intervene per policy. Determine patient's food preferences and provide high-protein, high-caloric foods as appropriate.     - Assist patient with eating.  - Allow adequate time for meals.  - Encourage patient to take dietary supplement as ordered.  - Collaborate with clinical nutritionist.  - Include patient/family/caregiver in decisions related to nutrition.  Outcome: Progressing

## 2025-05-30 NOTE — ASSESSMENT & PLAN NOTE
Patient presented with distal left arm pain/numbness/weakness since 2 AM on 5/26. Patient reports having numbness on bilateral 5th fingers for the past month, however weakness & pain from left elbow down to left hand is new.  Initial concern for stroke given prior hx. not a candidate for TNK, reports taking her Xarelto at around 2:30 AM PTA.  CTA head/neck: No acute intracranial hemorrhage, sequela of old infarction left thalamus, left caudate and left ganglial capsular region. No large vessel arterial occlusion, significant stenosis or focal aneurysm.  MRI brain showed no evidence of acute infarction  Received aspirin 325 mg in ED. Restarted baby ASA, Xarelto & atorvastatin at higher dose.  MRI Cervical Spine: No disc herniation, canal or foraminal stenosis.   Vitamin B12 level: 363  Neurology signed off, symptoms likely due to neuropathy.  Orthopedic Surgery signed off,   Consider neuro evaluation given possible CNS etiology.  No acute ortho intervention. Consider EMG/NCS as an outpatient   Continue Pain management   IV Toradol, Tylenol, switch Robaxin to Flexeril, gabapentin; PRN oxycodone and IV morphine.  Consider neuromuscular evaluation in 4-6 weeks

## 2025-05-30 NOTE — H&P
"Interventional Radiology Preprocedure Note    History/Indication for procedure:   Tony Roberto is a 39 y.o. female with a PMH of RLE CLTI with rest pain and a small dorsum foot wound who presents for RLE arteriography and intervention.    Relevant past medical history:    Past Medical History[1]  Problem List[2]    /72 (BP Location: Left arm)   Pulse 85   Temp 97.8 °F (36.6 °C) (Oral)   Resp 18   Ht 5' 1\" (1.549 m)   Wt 99.8 kg (220 lb 0.3 oz)   LMP 04/18/2025 (Approximate)   SpO2 97%   BMI 41.57 kg/m²     Medications:    Inpatient Medications:     Scheduled Medications:  Current Facility-Administered Medications   Medication Dose Route Frequency Provider Last Rate    acetaminophen  975 mg Oral Q8H [facility] Shanthi Lane PA-C      albuterol  1 puff Inhalation Q4H PRN Shanthi Lane PA-C      Artificial Tears  2 drop Both Eyes Q6H PRN Shanthi Lane PA-C      aspirin  81 mg Oral Daily Shanthi Lane PA-C      atorvastatin  80 mg Oral Daily With Dinner Shanthi Lane PA-C      cyclobenzaprine  10 mg Oral TID Shanthi aLne PA-C      DULoxetine  60 mg Oral Daily Shanthi Lane PA-C      Fluticasone Furoate-Vilanterol  1 puff Inhalation Daily Shanthi Lane PA-C      gabapentin  100 mg Oral TID Shanthi Lane PA-C      hydrALAZINE  5 mg Intravenous Q6H PRN Shanthi Lane PA-C      hydrOXYzine HCL  25 mg Oral BID PRN Shanthi Lane PA-C      insulin glargine  30 Units Subcutaneous Q12H [facility] Shanthi Lane PA-C      insulin lispro  1-5 Units Subcutaneous HS Shanthi Lane PA-C      insulin lispro  1-6 Units Subcutaneous TID AC Shanthi Lane PA-C      lisinopril  20 mg Oral Daily Shanthi Lane PA-C      magnesium Oxide  400 mg Oral BID Shanthi Lane PA-C      morphine injection  4 mg Intravenous Q4H PRN Shanthi Lane PA-C      nicotine  1 patch Transdermal Daily Shanthi Lane PA-C      oxyCODONE  2.5 mg Oral Q6H PRN Shanthi Lane, PA-C      oxyCODONE  5 mg Oral Q6H PRN Shanthi Lane, " ENOCH      prazosin  1 mg Oral HS Shanthi Lane PA-C      [Held by provider] rivaroxaban  20 mg Oral Daily With Breakfast Shanthi Lane PA-C         Infusions:       PRN:    albuterol    Artificial Tears    hydrALAZINE    hydrOXYzine HCL    morphine injection    oxyCODONE    oxyCODONE    Outpatient Medications:  Medications Ordered Prior to Encounter[3]    Allergies[4]    Anticoagulants: ASA    ASA classification: ASA 4 - Patient with severe systemic disease that is a constant threat to life    Airway Assessment: III (soft and hard palate and base of uvula visible)    Relevant family history: None    Relevant review of systems: None    Prior sedation/anesthesia: yes    Can the patient lie flat? Yes     NPO Status: yes    Labs:   CBC with diff:   Lab Results   Component Value Date    WBC 6.64 05/30/2025    HGB 11.9 05/30/2025    HCT 34.3 (L) 05/30/2025    MCV 76 (L) 05/30/2025     05/30/2025    RBC 4.52 05/30/2025    MCH 26.3 (L) 05/30/2025    MCHC 34.7 05/30/2025    RDW 15.0 05/30/2025    MPV 10.4 05/30/2025    NRBC 0 05/30/2025     BMP/CMP:  Lab Results   Component Value Date    K 4.2 05/30/2025    K 4.1 06/07/2023     05/30/2025     06/07/2023    CO2 28 05/30/2025    CO2 23 06/07/2023    BUN 22 05/30/2025    BUN 15 06/07/2023    CREATININE 0.47 (L) 05/30/2025    CREATININE 0.51 (L) 06/07/2023    GLUCOSE 177 (H) 09/14/2021    CALCIUM 8.3 (L) 05/30/2025    CALCIUM 8.9 06/07/2023    AST 11 (L) 05/30/2025    AST 14 06/07/2023    ALT 16 05/30/2025    ALT 23 06/07/2023    ALKPHOS 93 05/30/2025    ALKPHOS 135 (H) 06/07/2023    EGFR 125 05/30/2025    EGFR 124 06/07/2023    EGFR 108 10/25/2018   ,     Coags:   Lab Results   Component Value Date    PT 14.3 06/07/2023     (H) 08/12/2024    INR 0.95 05/30/2025    INR 1.1 06/07/2023   ,   Results from last 7 days   Lab Units 05/30/25  0523 05/28/25  0521   INR  0.95 1.08        Relevant imaging studies:   Reviewed.    Directed physical  examination:  I agree with the physical exam performed on 5/29/25 and there are no additional changes.    Assessment/Plan:   RLE arteriography and intervention.    Sedation/Anesthesia plan:  Moderate sedation will be used as needed for procedure.    Consent with alternatives to the procedure, risks and benefits have been explained and discussed with the patient/patient's family: yes.         [1]   Past Medical History:  Diagnosis Date    Asthma     Atherosclerosis     Bipolar 1 disorder ([facility]ly [facility])     COPD (chronic obstructive pulmonary disease) ([facility]ly [facility])     Depression     Diabetes mellitus ([facility])     Hypertension     Psychiatric disorder     cutting history    PTSD (post-traumatic stress disorder)     Schizoaffective disorder ([facility])     Tendonitis    [2]   Patient Active Problem List  Diagnosis    Essential hypertension    Type 2 diabetes mellitus with diabetic polyneuropathy, with long-term current use of insulin ([facility])    Paresthesia    COPD (chronic obstructive pulmonary disease)/asthma    Nicotine dependence    Bipolar disorder    Leg pain    Bilateral leg pain    Diarrhea    Morbid obesity    Bursitis of left knee    Peripheral arterial disease ([facility])    Positive D dimer    Class 3 severe obesity due to excess calories without serious comorbidity with body mass index (BMI) of 45.0 to 49.9 in adult    Postoperative blood loss anemia    Leukocytosis    Pyuria    Status post fall    Constipation    S/P AKA (above knee amputation) unilateral, left ([facility])    Cocaine use    Suicide attempt ([facility])    Obstructive sleep apnea    Moderate persistent asthma without complication    Mixed hyperlipidemia    Hydradenitis    Diplopia    Chronic bilateral low back pain without sciatica    Diabetic autonomic neuropathy associated with type 2 diabetes mellitus ([facility])    Trichomoniasis    Superficial femoral artery occlusion ([facility])    Syncope    Uncontrolled type 2 diabetes mellitus with diabetic polyneuropathy, without long-term current use of  "insulin    Hypertensive urgency    Iron deficiency anemia    Urinary incontinence    Tobacco abuse    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     Current Outpatient Medications on File Prior to Encounter   Medication Sig Dispense Refill    Advair -21 MCG/ACT inhaler Inhale 2 puffs 2 (two) times a day Rinse mouth after use 12 g 1    albuterol (PROVENTIL HFA,VENTOLIN HFA) 90 mcg/act inhaler INHALE 2 PUFFS EVERY 4 HOURS AS NEEDED FOR WHEEZING OR SHORTNESS OF BREATH 8.5 g 5    Alcohol Swabs (B-D SINGLE USE SWABS REGULAR) PADS USE 3-4 TIMES DAILY WITH  each 1    atorvastatin (LIPITOR) 40 mg tablet TAKE 1 TABLET (40 MG TOTAL) BY MOUTH DAILY WITH DINNER 90 tablet 1    B-D INS SYR MICROFINE .5CC/28G 28G X 1/2\" 0.5 ML MISC USE AS DIRECTED 100 each 5    BD Pen Needle Leann 2nd Gen 32G X 4 MM MISC INJECT UNDER THE SKIN 4 (FOUR) TIMES A DAY (BEFORE MEALS AND AT BEDTIME) 100 each 1    Blood Glucose Monitoring Suppl (ONE TOUCH ULTRA 2) w/Device KIT BY DEVICE ROUTE 2 (TWO) TIMES A DAY CHECK BLOOD SUGARS AND RECORD VALUE AND TIME OF DAY TWICE A DAY 1 kit 2    Blood Pressure Monitoring (Blood Pressure Monitor Automat) KATLYN Use Daily as Directed 1 each 0    Comfort EZ Pen Needles 33G X 4 MM MISC USE AS DIRECTED 300 each 0    DULoxetine (CYMBALTA) 30 mg delayed release capsule TAKE 2 CAPSULES (60 MG TOTAL) BY MOUTH DAILY 60 capsule 5    insulin lispro (HumALOG/ADMELOG) 100 units/mL injection INJECT 4-16 UNITS UNDER THE SKIN 3 (THREE) TIMES A DAY BEFORE MEALS 10 mL 6    lisinopril (ZESTRIL) 20 mg tablet TAKE 1 TABLET (20 MG TOTAL) BY MOUTH DAILY 90 tablet 1    Xarelto 20 MG tablet TAKE 1 TABLET (20 MG TOTAL) BY MOUTH DAILY WITH BREAKFAST 30 tablet 5    " acetaminophen (TYLENOL) 500 mg tablet Take 2 tablets (1,000 mg total) by mouth every 8 (eight) hours as needed for mild pain (Patient not taking: Reported on 5/26/2025) 60 tablet 0    aspirin 81 mg chewable tablet CHEW 1 TABLET (81 MG TOTAL) DAILY 30 tablet 2    Global Inject Ease Lancets 30G MISC USE 3 (THREE) TIMES A  each 0    glycerin-hypromellose- (ARTIFICIAL TEARS) 0.2-0.2-1 % SOLN Administer 2 drops to both eyes every 6 (six) hours as needed (for eye discomfort) 30 mL 1    hydrOXYzine HCL (ATARAX) 50 mg tablet TAKE 1 TABLET (50 MG TOTAL) BY MOUTH DAILY AT BEDTIME AS NEEDED FOR ITCHING OR ANXIETY (Patient not taking: Reported on 5/26/2025) 30 tablet 0    ibuprofen (MOTRIN) 600 mg tablet Take 1 tablet (600 mg total) by mouth every 8 (eight) hours as needed for mild pain 30 tablet 0    ketorolac (TORADOL) 10 mg tablet Take 1 tablet (10 mg total) by mouth every 6 (six) hours as needed for moderate pain (Patient not taking: Reported on 5/26/2025) 20 tablet 0    ketotifen (ZADITOR) 0.025 % ophthalmic solution Administer 1 drop to both eyes 2 (two) times a day as needed (for eye discomfort) (Patient not taking: Reported on 5/26/2025) 5 mL 0    Lantus 100 UNIT/ML subcutaneous injection INJECT 30 UNITS UNDER THE SKIN EVERY 12 (TWELVE) HOURS 10 mL 6    lidocaine (Lidoderm) 5 % Apply 1 patch topically over 12 hours daily Remove & Discard patch within 12 hours or as directed by MD 5 patch 0    magnesium chloride-calcium (Slow-Mag) 71.5-119 mg Take 2 tablets by mouth daily for 7 days 14 tablet 0    metFORMIN (GLUCOPHAGE) 1000 MG tablet TAKE ONE (1) TABLET BY MOUTH TWICE DAILY WITH FOOD 60 tablet 0    methocarbamol (ROBAXIN) 500 mg tablet Take 1 tablet (500 mg total) by mouth 2 (two) times a day (Patient not taking: Reported on 5/26/2025) 20 tablet 0    naproxen (Naprosyn) 500 mg tablet Take 1 tablet (500 mg total) by mouth 2 (two) times a day with meals for 7 days 14 tablet 0    nicotine (NICODERM CQ) 21  "mg/24 hr TD 24 hr patch Place 1 patch on the skin over 24 hours daily (Patient not taking: Reported on 9/11/2024) 28 patch 3    OneTouch Ultra test strip Use 1 each daily as needed (before meals) Use as instructed (Patient not taking: Reported on 5/26/2025) 200 each 2    prazosin (MINIPRESS) 1 mg capsule TAKE ONE (1) CAPSULE ONCE A DAY AT BEDTIME FOR HYPERTENSION (Patient not taking: Reported on 5/26/2025) 90 capsule 1    Sure Comfort Insulin Syringe 31G X 5/16\" 0.3 ML MISC 2 (two) times a day Use as directed (Patient not taking: Reported on 5/26/2025)      topiramate (TOPAMAX) 25 mg tablet TAKE 1 TABLET (25 MG TOTAL) BY MOUTH 2 (TWO) TIMES A DAY (Patient not taking: Reported on 5/26/2025) 60 tablet 1    Trulicity 1.5 MG/0.5ML SOAJ AS DIRECTED WEEKLY (Patient not taking: Reported on 5/26/2025) 2 mL 5   [4] No Known Allergies    "

## 2025-05-30 NOTE — PLAN OF CARE
Problem: PAIN - ADULT  Goal: Verbalizes/displays adequate comfort level or baseline comfort level  Description: Interventions:  - Encourage patient to monitor pain and request assistance  - Assess pain using appropriate pain scale  - Administer analgesics as ordered based on type and severity of pain and evaluate response  - Implement non-pharmacological measures as appropriate and evaluate response  - Consider cultural and social influences on pain and pain management  - Notify physician/advanced practitioner if interventions unsuccessful or patient reports new pain  - Educate patient/family on pain management process including their role and importance of  reporting pain   - Provide non-pharmacologic/complimentary pain relief interventions  Outcome: Progressing     Problem: SAFETY ADULT  Goal: Patient will remain free of falls  Description: INTERVENTIONS:  - Educate patient/family on patient safety including physical limitations  - Instruct patient to call for assistance with activity   - Consider consulting OT/PT to assist with strengthening/mobility based on AM PAC & JH-HLM score  - Consult OT/PT to assist with strengthening/mobility   - Keep Call bell within reach  - Keep bed low and locked with side rails adjusted as appropriate  - Keep care items and personal belongings within reach  - Initiate and maintain comfort rounds  - Make Fall Risk Sign visible to staff  - Offer Toileting every 2 Hours, in advance of need  - Initiate/Maintain bed alarm  - Obtain necessary fall risk management equipment: floor cushion  - Apply yellow socks and bracelet for high fall risk patients  - Consider moving patient to room near nurses station  Outcome: Progressing     Problem: DISCHARGE PLANNING  Goal: Discharge to home or other facility with appropriate resources  Description: INTERVENTIONS:  - Identify barriers to discharge w/patient and caregiver  - Arrange for needed discharge resources and transportation as  appropriate  - Identify discharge learning needs (meds, wound care, etc.)  - Arrange for interpretive services to assist at discharge as needed  - Refer to Case Management Department for coordinating discharge planning if the patient needs post-hospital services based on physician/advanced practitioner order or complex needs related to functional status, cognitive ability, or social support system  Outcome: Progressing     Problem: Knowledge Deficit  Goal: Patient/family/caregiver demonstrates understanding of disease process, treatment plan, medications, and discharge instructions  Description: Complete learning assessment and assess knowledge base.  Interventions:  - Provide teaching at level of understanding  - Provide teaching via preferred learning methods  Outcome: Progressing     Problem: Neurological Deficit  Goal: Neurological status is stable or improving  Description: Interventions:  - Monitor and assess patient's level of consciousness, motor function, sensory function, and level of assistance needed for ADLs.   - Monitor and report changes from baseline. Collaborate with interdisciplinary team to initiate plan and implement interventions as ordered.   - Provide and maintain a safe environment.  - Consider seizure precautions.  - Consider fall precautions.  - Consider aspiration precautions.  - Consider bleeding precautions.  Outcome: Progressing     Problem: Activity Intolerance/Impaired Mobility  Goal: Mobility/activity is maintained at optimum level for patient  Description: Interventions:  - Assess and monitor patient  barriers to mobility and need for assistive/adaptive devices.  - Assess patient's emotional response to limitations.  - Collaborate with interdisciplinary team and initiate plans and interventions as ordered.  - Encourage independent activity per ability.  - Maintain proper body alignment.  - Perform active/passive rom as tolerated/ordered.  - Plan activities to conserve energy.  -  Turn patient as appropriate  Outcome: Progressing     Problem: Communication Impairment  Goal: Ability to express needs and understand communication  Description: Assess patient's communication skills and ability to understand information.  Patient will demonstrate use of effective communication techniques, alternative methods of communication and understanding even if not able to speak.     - Encourage communication and provide alternate methods of communication as needed.  - Collaborate with case management/ for discharge needs.  - Include patient/family/caregiver in decisions related to communication.  Outcome: Progressing     Problem: Potential for Aspiration  Goal: Non-ventilated patient's risk of aspiration is minimized  Description: Assess and monitor vital signs, respiratory status, and labs (WBC).  Monitor for signs of aspiration (tachypnea, cough, rales, wheezing, cyanosis, fever).    - Assess and monitor patient's ability to swallow.  - Place patient up in chair to eat if possible.  - HOB up at 90 degrees to eat if unable to get patient up into chair.  - Supervise patient during oral intake.   - Instruct patient/ family to take small bites.  - Instruct patient/ family to take small single sips when taking liquids.  - Follow patient-specific strategies generated by speech pathologist.  Outcome: Progressing     Problem: Nutrition  Goal: Nutrition/Hydration status is improving  Description: Monitor and assess patient's nutrition/hydration status for malnutrition (ex- brittle hair, bruises, dry skin, pale skin and conjunctiva, muscle wasting, smooth red tongue, and disorientation). Collaborate with interdisciplinary team and initiate plan and interventions as ordered.  Monitor patient's weight and dietary intake as ordered or per policy. Utilize nutrition screening tool and intervene per policy. Determine patient's food preferences and provide high-protein, high-caloric foods as appropriate.      - Assist patient with eating.  - Allow adequate time for meals.  - Encourage patient to take dietary supplement as ordered.  - Collaborate with clinical nutritionist.  - Include patient/family/caregiver in decisions related to nutrition.  Outcome: Progressing

## 2025-05-30 NOTE — PROGRESS NOTES
05/30/25 1:58 PM     DM A1c outreach is not required, recent A1c performed in pt.5-26-25    Thank you.  Blaire Noyola  [facility]

## 2025-05-31 LAB
ALBUMIN SERPL BCG-MCNC: 3.2 G/DL (ref 3.5–5)
ALP SERPL-CCNC: 106 U/L (ref 34–104)
ALT SERPL W P-5'-P-CCNC: 20 U/L (ref 7–52)
ANION GAP SERPL CALCULATED.3IONS-SCNC: 7 MMOL/L (ref 4–13)
AST SERPL W P-5'-P-CCNC: 16 U/L (ref 13–39)
BILIRUB SERPL-MCNC: 0.25 MG/DL (ref 0.2–1)
BUN SERPL-MCNC: 18 MG/DL (ref 5–25)
CALCIUM ALBUM COR SERPL-MCNC: 8.7 MG/DL (ref 8.3–10.1)
CALCIUM SERPL-MCNC: 8.1 MG/DL (ref 8.4–10.2)
CHLORIDE SERPL-SCNC: 103 MMOL/L (ref 96–108)
CO2 SERPL-SCNC: 27 MMOL/L (ref 21–32)
CREAT SERPL-MCNC: 0.54 MG/DL (ref 0.6–1.3)
ERYTHROCYTE [DISTWIDTH] IN BLOOD BY AUTOMATED COUNT: 15.1 % (ref 11.6–15.1)
GFR SERPL CREATININE-BSD FRML MDRD: 120 ML/MIN/1.73SQ M
GLUCOSE SERPL-MCNC: 113 MG/DL (ref 65–140)
GLUCOSE SERPL-MCNC: 157 MG/DL (ref 65–140)
GLUCOSE SERPL-MCNC: 231 MG/DL (ref 65–140)
GLUCOSE SERPL-MCNC: 248 MG/DL (ref 65–140)
GLUCOSE SERPL-MCNC: 279 MG/DL (ref 65–140)
HCT VFR BLD AUTO: 34.3 % (ref 34.8–46.1)
HGB BLD-MCNC: 11.8 G/DL (ref 11.5–15.4)
MCH RBC QN AUTO: 26.1 PG (ref 26.8–34.3)
MCHC RBC AUTO-ENTMCNC: 34.4 G/DL (ref 31.4–37.4)
MCV RBC AUTO: 76 FL (ref 82–98)
PLATELET # BLD AUTO: 323 THOUSANDS/UL (ref 149–390)
PMV BLD AUTO: 10.6 FL (ref 8.9–12.7)
POTASSIUM SERPL-SCNC: 4.1 MMOL/L (ref 3.5–5.3)
PROT SERPL-MCNC: 5.8 G/DL (ref 6.4–8.4)
RBC # BLD AUTO: 4.52 MILLION/UL (ref 3.81–5.12)
SODIUM SERPL-SCNC: 137 MMOL/L (ref 135–147)
WBC # BLD AUTO: 7.49 THOUSAND/UL (ref 4.31–10.16)

## 2025-05-31 PROCEDURE — 82948 REAGENT STRIP/BLOOD GLUCOSE: CPT

## 2025-05-31 PROCEDURE — 99232 SBSQ HOSP IP/OBS MODERATE 35: CPT | Performed by: PHYSICIAN ASSISTANT

## 2025-05-31 PROCEDURE — 85027 COMPLETE CBC AUTOMATED: CPT

## 2025-05-31 PROCEDURE — 80053 COMPREHEN METABOLIC PANEL: CPT

## 2025-05-31 RX ORDER — INSULIN GLARGINE 100 [IU]/ML
32 INJECTION, SOLUTION SUBCUTANEOUS EVERY 12 HOURS SCHEDULED
Status: DISCONTINUED | OUTPATIENT
Start: 2025-05-31 | End: 2025-06-01

## 2025-05-31 RX ADMIN — ACETAMINOPHEN 975 MG: 325 TABLET, FILM COATED ORAL at 22:32

## 2025-05-31 RX ADMIN — GABAPENTIN 100 MG: 100 CAPSULE ORAL at 22:39

## 2025-05-31 RX ADMIN — CYCLOBENZAPRINE 10 MG: 10 TABLET, FILM COATED ORAL at 08:12

## 2025-05-31 RX ADMIN — INSULIN GLARGINE 32 UNITS: 100 INJECTION, SOLUTION SUBCUTANEOUS at 08:24

## 2025-05-31 RX ADMIN — ACETAMINOPHEN 975 MG: 325 TABLET, FILM COATED ORAL at 05:56

## 2025-05-31 RX ADMIN — LISINOPRIL 20 MG: 20 TABLET ORAL at 08:12

## 2025-05-31 RX ADMIN — Medication 400 MG: at 08:12

## 2025-05-31 RX ADMIN — GABAPENTIN 100 MG: 100 CAPSULE ORAL at 08:12

## 2025-05-31 RX ADMIN — OXYCODONE 5 MG: 5 TABLET ORAL at 20:34

## 2025-05-31 RX ADMIN — INSULIN LISPRO 2 UNITS: 100 INJECTION, SOLUTION INTRAVENOUS; SUBCUTANEOUS at 22:48

## 2025-05-31 RX ADMIN — ASPIRIN 81 MG 81 MG: 81 TABLET ORAL at 08:12

## 2025-05-31 RX ADMIN — RIVAROXABAN 20 MG: 20 TABLET, FILM COATED ORAL at 08:12

## 2025-05-31 RX ADMIN — DULOXETINE HYDROCHLORIDE 60 MG: 60 CAPSULE, DELAYED RELEASE ORAL at 08:12

## 2025-05-31 RX ADMIN — ATORVASTATIN CALCIUM 80 MG: 40 TABLET, FILM COATED ORAL at 17:24

## 2025-05-31 RX ADMIN — OXYCODONE 5 MG: 5 TABLET ORAL at 08:23

## 2025-05-31 RX ADMIN — INSULIN LISPRO 4 UNITS: 100 INJECTION, SOLUTION INTRAVENOUS; SUBCUTANEOUS at 08:24

## 2025-05-31 RX ADMIN — ACETAMINOPHEN 975 MG: 325 TABLET, FILM COATED ORAL at 13:28

## 2025-05-31 RX ADMIN — INSULIN LISPRO 1 UNITS: 100 INJECTION, SOLUTION INTRAVENOUS; SUBCUTANEOUS at 11:50

## 2025-05-31 RX ADMIN — CYCLOBENZAPRINE 10 MG: 10 TABLET, FILM COATED ORAL at 17:24

## 2025-05-31 RX ADMIN — PRAZOSIN HYDROCHLORIDE 1 MG: 1 CAPSULE ORAL at 22:32

## 2025-05-31 RX ADMIN — NICOTINE 1 PATCH: 21 PATCH, EXTENDED RELEASE TRANSDERMAL at 08:15

## 2025-05-31 RX ADMIN — INSULIN GLARGINE 32 UNITS: 100 INJECTION, SOLUTION SUBCUTANEOUS at 22:32

## 2025-05-31 RX ADMIN — CYCLOBENZAPRINE 10 MG: 10 TABLET, FILM COATED ORAL at 22:39

## 2025-05-31 RX ADMIN — GABAPENTIN 100 MG: 100 CAPSULE ORAL at 17:23

## 2025-05-31 RX ADMIN — Medication 400 MG: at 17:24

## 2025-05-31 NOTE — ASSESSMENT & PLAN NOTE
Lab Results   Component Value Date    HGBA1C 13.3 (H) 05/26/2025     Recent Labs     05/30/25  1643 05/30/25 2025 05/31/25  0723 05/31/25  1104   POCGLU 234* 225* 279* 157*     Blood Sugar Average: Last 72 hrs:  (P) [id number]    Poorly controlled T2DM per most recent HgbA1c 11. Patient admits to noncompliance with insulin/medications, notes difficulty with affording and running out of supplies for glucometer & recently applied for Medicaid.  Updated HgbA1c 13.3, very poorly controlled T2DM.  Prior home regimen: Lantus 30U BID, Humalog SSI 4-16U TID, Trulicity weekly injections.   Increase Lantus 30->32U BID with ongoing hyperglycemia, will need prescription/price check for diabetes supplies prior to discharge  SSI coverage with [employer]  Carb controlled diet, hypoglycemia protocol

## 2025-05-31 NOTE — ASSESSMENT & PLAN NOTE
History of prior strokes, initially found to have evidence of chronic infarcts on CT head in December 2024  Suspect possibly related to medication noncompliance, poorly controlled HTN and T2DM  Echo: EF 60%, normal systolic and diastolic function. No PFO with bubble study.  MRI brain this admission with no acute stroke  Updated lipid panel: , , HDL 50, .  Continue home baby aspirin, Xarelto restarted per vascular recommendations, increased atorvastatin to 80 mg daily

## 2025-05-31 NOTE — PLAN OF CARE
Problem: PAIN - ADULT  Goal: Verbalizes/displays adequate comfort level or baseline comfort level  Description: Interventions:  - Encourage patient to monitor pain and request assistance  - Assess pain using appropriate pain scale  - Administer analgesics as ordered based on type and severity of pain and evaluate response  - Implement non-pharmacological measures as appropriate and evaluate response  - Consider cultural and social influences on pain and pain management  - Notify physician/advanced practitioner if interventions unsuccessful or patient reports new pain  - Educate patient/family on pain management process including their role and importance of  reporting pain   - Provide non-pharmacologic/complimentary pain relief interventions  Outcome: Progressing     Problem: INFECTION - ADULT  Goal: Absence or prevention of progression during hospitalization  Description: INTERVENTIONS:  - Assess and monitor for signs and symptoms of infection  - Monitor lab/diagnostic results  - Monitor all insertion sites, i.e. indwelling lines, tubes, and drains  - Monitor endotracheal if appropriate and nasal secretions for changes in amount and color  - [place] appropriate cooling/warming therapies per order  - Administer medications as ordered  - Instruct and encourage patient and family to use good hand hygiene technique  - Identify and instruct in appropriate isolation precautions for identified infection/condition  Outcome: Progressing     Problem: SAFETY ADULT  Goal: Patient will remain free of falls  Description: INTERVENTIONS:  - Educate patient/family on patient safety including physical limitations  - Instruct patient to call for assistance with activity   - Consider consulting OT/PT to assist with strengthening/mobility based on AM PAC & JH-HLM score  - Consult OT/PT to assist with strengthening/mobility   - Keep Call bell within reach  - Keep bed low and locked with side rails adjusted as appropriate  - Keep  care items and personal belongings within reach  - Initiate and maintain comfort rounds  - Make Fall Risk Sign visible to staff  - Offer Toileting every 2 Hours, in advance of need  - Initiate/Maintain Bed alarm  - Obtain necessary fall risk management equipment:   - Apply yellow socks and bracelet for high fall risk patients  - Consider moving patient to room near nurses station  Outcome: Progressing

## 2025-05-31 NOTE — ASSESSMENT & PLAN NOTE
Patient presented with distal left arm pain/numbness/weakness since 2 AM on 5/26. Patient reports having numbness on bilateral 5th fingers for the past month, however weakness & pain from left elbow down to left hand is new.  Initial concern for stroke given prior hx. not a candidate for TNK, reports taking her Xarelto at around 2:30 AM PTA.  CTA head/neck: No acute intracranial hemorrhage, sequela of old infarction left thalamus, left caudate and left ganglial capsular region. No large vessel arterial occlusion, significant stenosis or focal aneurysm.  MRI brain showed no evidence of acute infarction  MRI C-spine with no disc herniation, canal or foraminal stenosis. XR left hand, wrist & forearm unremarkable.  Received aspirin 325 mg in ED. Restarted baby ASA, Xarelto & atorvastatin at higher dose.  Neurology signed off, symptoms likely due to neuropathy.  Orthopedic surgery signed off, could consider EMG/neuromuscular evaluation in 4-6 weeks.  Pain management with Tylenol, Flexeril, gabapentin; PRN oxycodone; completed IV Toradol, D/C IV morphine.

## 2025-05-31 NOTE — PLAN OF CARE
Problem: PAIN - ADULT  Goal: Verbalizes/displays adequate comfort level or baseline comfort level  Description: Interventions:  - Encourage patient to monitor pain and request assistance  - Assess pain using appropriate pain scale  - Administer analgesics as ordered based on type and severity of pain and evaluate response  - Implement non-pharmacological measures as appropriate and evaluate response  - Consider cultural and social influences on pain and pain management  - Notify physician/advanced practitioner if interventions unsuccessful or patient reports new pain  - Educate patient/family on pain management process including their role and importance of  reporting pain   - Provide non-pharmacologic/complimentary pain relief interventions  5/31/2025 0603 by Kirti Houston RN  Outcome: Progressing  5/30/2025 2246 by Kirti Houston RN  Outcome: Progressing  5/30/2025 2245 by Kirti Houston RN  Outcome: Progressing     Problem: INFECTION - ADULT  Goal: Absence or prevention of progression during hospitalization  Description: INTERVENTIONS:  - Assess and monitor for signs and symptoms of infection  - Monitor lab/diagnostic results  - Monitor all insertion sites, i.e. indwelling lines, tubes, and drains  - Monitor endotracheal if appropriate and nasal secretions for changes in amount and color  - [place] appropriate cooling/warming therapies per order  - Administer medications as ordered  - Instruct and encourage patient and family to use good hand hygiene technique  - Identify and instruct in appropriate isolation precautions for identified infection/condition  5/31/2025 0603 by Kirti Houston RN  Outcome: Progressing  5/30/2025 2246 by Kirti Houston RN  Outcome: Progressing  5/30/2025 2245 by Kirti Houston RN  Outcome: Progressing     Problem: DISCHARGE PLANNING  Goal: Discharge to home or other facility with appropriate resources  Description: INTERVENTIONS:  - Identify barriers to discharge  w/patient and caregiver  - Arrange for needed discharge resources and transportation as appropriate  - Identify discharge learning needs (meds, wound care, etc.)  - Arrange for interpretive services to assist at discharge as needed  - Refer to Case Management Department for coordinating discharge planning if the patient needs post-hospital services based on physician/advanced practitioner order or complex needs related to functional status, cognitive ability, or social support system  5/31/2025 0603 by Kirti Houston RN  Outcome: Progressing  5/30/2025 2246 by Kirti Houston RN  Outcome: Progressing  5/30/2025 2245 by Kirti Houston RN  Outcome: Progressing     Problem: Knowledge Deficit  Goal: Patient/family/caregiver demonstrates understanding of disease process, treatment plan, medications, and discharge instructions  Description: Complete learning assessment and assess knowledge base.  Interventions:  - Provide teaching at level of understanding  - Provide teaching via preferred learning methods  5/31/2025 0603 by Kirti Houston RN  Outcome: Progressing  5/30/2025 2246 by Kirti Houston RN  Outcome: Progressing  5/30/2025 2245 by Kirti Houston RN  Outcome: Progressing     Problem: Neurological Deficit  Goal: Neurological status is stable or improving  Description: Interventions:  - Monitor and assess patient's level of consciousness, motor function, sensory function, and level of assistance needed for ADLs.   - Monitor and report changes from baseline. Collaborate with interdisciplinary team to initiate plan and implement interventions as ordered.   - Provide and maintain a safe environment.  - Consider seizure precautions.  - Consider fall precautions.  - Consider aspiration precautions.  - Consider bleeding precautions.  5/31/2025 0603 by Kirti Houston RN  Outcome: Progressing  5/30/2025 2246 by Kirti Houston RN  Outcome: Progressing  5/30/2025 2245 by Kirti Houston RN  Outcome:  Progressing

## 2025-05-31 NOTE — PLAN OF CARE
Problem: PAIN - ADULT  Goal: Verbalizes/displays adequate comfort level or baseline comfort level  Description: Interventions:  - Encourage patient to monitor pain and request assistance  - Assess pain using appropriate pain scale  - Administer analgesics as ordered based on type and severity of pain and evaluate response  - Implement non-pharmacological measures as appropriate and evaluate response  - Consider cultural and social influences on pain and pain management  - Notify physician/advanced practitioner if interventions unsuccessful or patient reports new pain  - Educate patient/family on pain management process including their role and importance of  reporting pain   - Provide non-pharmacologic/complimentary pain relief interventions  Outcome: Progressing     Problem: INFECTION - ADULT  Goal: Absence or prevention of progression during hospitalization  Description: INTERVENTIONS:  - Assess and monitor for signs and symptoms of infection  - Monitor lab/diagnostic results  - Monitor all insertion sites, i.e. indwelling lines, tubes, and drains  - Monitor endotracheal if appropriate and nasal secretions for changes in amount and color  - [place] appropriate cooling/warming therapies per order  - Administer medications as ordered  - Instruct and encourage patient and family to use good hand hygiene technique  - Identify and instruct in appropriate isolation precautions for identified infection/condition  Outcome: Progressing     Problem: DISCHARGE PLANNING  Goal: Discharge to home or other facility with appropriate resources  Description: INTERVENTIONS:  - Identify barriers to discharge w/patient and caregiver  - Arrange for needed discharge resources and transportation as appropriate  - Identify discharge learning needs (meds, wound care, etc.)  - Arrange for interpretive services to assist at discharge as needed  - Refer to Case Management Department for coordinating discharge planning if the patient needs  post-hospital services based on physician/advanced practitioner order or complex needs related to functional status, cognitive ability, or social support system  Outcome: Progressing     Problem: Knowledge Deficit  Goal: Patient/family/caregiver demonstrates understanding of disease process, treatment plan, medications, and discharge instructions  Description: Complete learning assessment and assess knowledge base.  Interventions:  - Provide teaching at level of understanding  - Provide teaching via preferred learning methods  Outcome: Progressing     Problem: Activity Intolerance/Impaired Mobility  Goal: Mobility/activity is maintained at optimum level for patient  Description: Interventions:  - Assess and monitor patient  barriers to mobility and need for assistive/adaptive devices.  - Assess patient's emotional response to limitations.  - Collaborate with interdisciplinary team and initiate plans and interventions as ordered.  - Encourage independent activity per ability.  - Maintain proper body alignment.  - Perform active/passive rom as tolerated/ordered.  - Plan activities to conserve energy.  - Turn patient as appropriate  Outcome: Progressing     Problem: Nutrition  Goal: Nutrition/Hydration status is improving  Description: Monitor and assess patient's nutrition/hydration status for malnutrition (ex- brittle hair, bruises, dry skin, pale skin and conjunctiva, muscle wasting, smooth red tongue, and disorientation). Collaborate with interdisciplinary team and initiate plan and interventions as ordered.  Monitor patient's weight and dietary intake as ordered or per policy. Utilize nutrition screening tool and intervene per policy. Determine patient's food preferences and provide high-protein, high-caloric foods as appropriate.     - Assist patient with eating.  - Allow adequate time for meals.  - Encourage patient to take dietary supplement as ordered.  - Collaborate with clinical nutritionist.  - Include  patient/family/caregiver in decisions related to nutrition.  Outcome: Progressing

## 2025-05-31 NOTE — ASSESSMENT & PLAN NOTE
Wound on right lateral ankle and right first metatarsal base POA, patient reports sustaining right ankle wound from scraping on her wheelchair but has had poor healing  XR right foot/ankle with no acute osseous abnormalities  Does not appear infectious, poor wound healing with PAD  Podiatry following, recommend local wound care with hydrogel with silver & dry dressing daily & F/U outpatient in 1 week  WBAT to RLE, continue local wound care

## 2025-05-31 NOTE — ASSESSMENT & PLAN NOTE
[facility] BEHAVIORAL HEALTH SERVICES  [facility] [place] BEHAVIORAL HEALTH [facility] [address] [place] [address]      Glendy Arredondo :1983 MRN:[id number]      2022 Time Session Began:1:04pm Time Session Ended: 1:46pm    Due to COVID-19 precautions, this visit was performed via live interactive two-way video with patient's verbal consent.    Clinician Location:Home.  Patient Location: Home.  Verified patient identity:  [x] Yes    Session Type:45 Minute Therapy ([id number]) (38-52 minutes)    Others Present: dad was present     Intervention: Behavioral, Cognitive, Supportive    Suicide/Homicide/Violence Ideation: No    If Yes, explain:None    Current Outpatient Medications   Medication Sig   • hydroCORTisone (CORTIZONE) 2.5 % cream APPLY TO IRRITATED SKIN FOLD TWICE DAILY WITH THE KETOCONAZOLE CREAM AS NEEDED   • minocycline (MINOCIN) 100 MG capsule Take 1 capsule by mouth in the morning and 1 capsule before bedtime. Take each pill with a full glass of water to get all the way down.   • rifAMPin (RIFADIN) 300 MG capsule Take 2 capsules by mouth daily.   • escitalopram (LEXAPRO) 20 MG tablet TAKE 2 TABLETS BY MOUTH DAILY   • OLANZapine (ZyPREXA) 15 MG tablet Take 2 tablets by mouth nightly.   • pregabalin (LYRICA) 50 MG capsule Take 1 capsule by mouth 3 times daily.   • triamcinolone (ARISTOCORT) 0.1 % cream APPLY TO AFFECTED AREA TWICE DAILY APPLY CETAPHIL CREAM OVER EACH APPLICATION   • LORazepam (ATIVAN) 1 MG tablet Do not start before May 22, 2022. TAKE 1 TABLET BY MOUTH FOUR TIMES DAILY AS NEEDED FOR ANXIETY.   • clobetasol (TEMOVATE) 0.05 % ointment Apply  Bid , follow with cetaphil cream   • ketoconazole (NIZORAL) 2 % cream MIX WITH AN EQUAL AMOUNT OF HYDROCORTISONE CREAM AND APPLY UNDER BREAST TWICE DAILY   • methIMAzole (TAPAZOLE) 5 MG tablet Take 1 tablet by mouth daily. Complete lab work prior to appt   • minocycline (MINOCIN) 100 MG capsule Take 1 capsule by mouth 2 times daily. Take each pill  History of PAD, known CLTI s/p left fem-BK pop bypass which subsequently occluded; s/p L AKA for nonsalvageable LLE in 2023  Last LEAD (3/21/25) revealed diffuse disease, significant decrease in right NIKKI now in severe range at 0.57.  S/p boomerang transfer to [facility] for angiogram (5/30): RLE arteriogram showed multifocal critical stenosis involving entirety of SFA and popliteal; s/p embolization with tPA.  Vascular surgery following, Xarelto resumed & await repeat LEAD/NIKKI on Monday 6/2   with a full glass of water to get all the way down.   • clobetasol (TEMOVATE) 0.05 % topical solution Apply to areas of hear loss twice a day   • Cetirizine HCl 10 MG Cap Take 20 mg by mouth 2 times daily. May increase to BID if needed after 4-5 days i itching persists.   • lidocaine-prilocaine (EMLA) cream Apply to area to be treated 3 and 1 hour before follow-up appointment   • cyclobenzaprine (FLEXERIL) 5 MG tablet TAKE 1 TABLET BY MOUTH THREE TIMES DAILY AS NEEDED FOR MUSCLE SPASMS   • IRON PO Take 325 mg by mouth.   • Cholecalciferol (VITAMIN D3 PO)    • MELOXICAM PO Take by mouth daily.   • fluticasone (FLONASE) 50 MCG/ACT nasal spray Spray 2 sprays in each nostril as needed.    • esomeprazole (NEXIUM) 40 MG capsule Take 80 mg by mouth daily (before breakfast).    • ondansetron (ZOFRAN ODT) 4 MG disintegrating tablet Take 1 tablet by mouth every 6 hours.   • Daily Multiple Vitamins TABS Take 1 tablet by mouth daily. chewable   • Loratadine 10 MG CAPS Take 1 capsule by mouth daily.     No current facility-administered medications for this visit.       Change in Medication(s) Reported: yes If Yes, explain: she reported some medication changes, though she will follow up with her doctors as she has some questions   Patient/Family Education Provided: Yes  Patient/Family Displays Understanding: Yes    If No, explain: NA     Chief complaint in patient's own words:  Client has continued feeling worried.   Progress Note containing chief complaint and symptoms/problems related to the complaint:    (Data/Action/Response/Plan)   .  D: Client has continued feeling worried. She hears sirens, was worried to the point of being tearful. She worried about leaving the house. She has at times felt a twitching in her feet and thinks it means something \"evil\". She has still been stomping, though more so in the daytime than night. She was worried about side effects after a medication change, felt worried about the information the  pharmacist gave her.    A: Discussed worries, triggers, coping strategies.Encouraged her to reach out to doctors with questions (to get factual information regarding questions) to counteract worry. Practiced mindfulness in session. Told to call if necessary prior to next session.    R: Client was tearful at times.    P: 7/7/22 at 1pm, focus on reducing worry.  Need for Community Resources Assessed: Yes    Resources Needed: No    If Yes, what resources: NA    Primary Diagnosis:F25.0 Schizoaffective disorder, Bipolar type (CMS/[facility])  (primary encounter diagnosis)  F41.1 Generalized anxiety disorder  F84.0 Autistic disorder, residual state        F43.29 Adjustment disorder with mixed emotional features    Treatment Plan: unchanged    Discharge Plan: Continue with sessions until goals are met and adequately maintained.    Next Appointment: 7/7/22 at 1pm, focus on reducing worry.     MARCELLE Lester

## 2025-05-31 NOTE — PROGRESS NOTES
Progress Note - Hospitalist   Name: Tony Roberto 39 y.o. female I MRN: [id number]  Unit/Bed#: -[facility] I Date of Admission: 5/26/2025   Date of Service: 5/31/2025 I Hospital Day: 4    Assessment & Plan  Left hand pain  Patient presented with distal left arm pain/numbness/weakness since 2 AM on 5/26. Patient reports having numbness on bilateral 5th fingers for the past month, however weakness & pain from left elbow down to left hand is new.  Initial concern for stroke given prior hx. not a candidate for TNK, reports taking her Xarelto at around 2:30 AM PTA.  CTA head/neck: No acute intracranial hemorrhage, sequela of old infarction left thalamus, left caudate and left ganglial capsular region. No large vessel arterial occlusion, significant stenosis or focal aneurysm.  MRI brain showed no evidence of acute infarction  MRI C-spine with no disc herniation, canal or foraminal stenosis. XR left hand, wrist & forearm unremarkable.  Received aspirin 325 mg in ED. Restarted baby ASA, Xarelto & atorvastatin at higher dose.  Neurology signed off, symptoms likely due to neuropathy.  Orthopedic surgery signed off, could consider EMG/neuromuscular evaluation in 4-6 weeks.  Pain management with Tylenol, Flexeril, gabapentin; PRN oxycodone; completed IV Toradol, D/C IV morphine.  Peripheral arterial disease (HCC)  History of PAD, known CLTI s/p left fem-BK pop bypass which subsequently occluded; s/p L AKA for nonsalvageable LLE in 2023  Last LEAD (3/21/25) revealed diffuse disease, significant decrease in right NIKKI now in severe range at 0.57.  S/p boomerang transfer to [facility] for angiogram (5/30): RLE arteriogram showed multifocal critical stenosis involving entirety of SFA and popliteal; s/p embolization with tPA.  Vascular surgery following, Xarelto resumed & await repeat LEAD/NIKKI on Monday 6/2  Type 2 diabetes mellitus with diabetic polyneuropathy, with long-term current use of insulin (HCC)  Lab Results   Component Value  Date    HGBA1C 13.3 (H) 05/26/2025     Recent Labs     05/30/25  1643 05/30/25 2025 05/31/25  0723 05/31/25  1104   POCGLU 234* 225* 279* 157*     Blood Sugar Average: Last 72 hrs:  (P) [id number]    Poorly controlled T2DM per most recent HgbA1c 11. Patient admits to noncompliance with insulin/medications, notes difficulty with affording and running out of supplies for glucometer & recently applied for Medicaid.  Updated HgbA1c 13.3, very poorly controlled T2DM.  Prior home regimen: Lantus 30U BID, Humalog SSI 4-16U TID, Trulicity weekly injections.   Increase Lantus 30->32U BID with ongoing hyperglycemia, will need prescription/price check for diabetes supplies prior to discharge  SSI coverage with Accu-Cheks ACHS  Carb controlled diet, hypoglycemia protocol  Wound of right foot  Wound on right lateral ankle and right first metatarsal base POA, patient reports sustaining right ankle wound from scraping on her wheelchair but has had poor healing  XR right foot/ankle with no acute osseous abnormalities  Does not appear infectious, poor wound healing with PAD  Podiatry following, recommend local wound care with hydrogel with silver & dry dressing daily & F/U outpatient in 1 week  WBAT to RLE, continue local wound care  History of CVA (cerebrovascular accident)  History of prior strokes, initially found to have evidence of chronic infarcts on CT head in December 2024  Suspect possibly related to medication noncompliance, poorly controlled HTN and T2DM  Echo: EF 60%, normal systolic and diastolic function. No PFO with bubble study.  MRI brain this admission with no acute stroke  Updated lipid panel: , , HDL 50, .  Continue home baby aspirin, Xarelto restarted per vascular recommendations, increased atorvastatin to 80 mg daily  Essential hypertension  Hypertensive urgency on admission, BP up to 200/110s  Reports noncompliance with home antihypertensives  BP reviewed, HTN improving  Continue  home lisinopril 20 mg daily, prazosin 1 mg qHS  History of substance use  History of cocaine use, patient admits to smoking crack cocaine 4 days PTA & marijuana 2 days PTA  UDS: + Cocaine and THC. No signs of withdrawal.  Strongly encouraged cessation of substance use  Class 3 severe obesity due to excess calories without serious comorbidity with body mass index (BMI) of 45.0 to 49.9 in adult  BMI 41.57, affects all aspects of care  Strongly encourage dietary/lifestyle modifications  COPD (chronic obstructive pulmonary disease)/asthma  Not in acute exacerbation, currently stable on room air  Continue daily maintenance inhaler, PRN albuterol  S/P AKA (above knee amputation) unilateral, left (HCC)  S/p left AKA on 3/29/2023, wheelchair-bound at baseline  Tobacco abuse  Nicotine patches, smoking cessation education    VTE Pharmacologic Prophylaxis: VTE Score: 3 Moderate Risk (Score 3-4) - Pharmacological DVT Prophylaxis Ordered: rivaroxaban (Xarelto).    Mobility:   Basic Mobility Inpatient Raw Score: 16  JH-HLM Goal: 5: Stand one or more mins  JH-HLM Achieved: 4: Move to chair/commode  JH-HLM Goal NOT achieved. Continue with multidisciplinary rounding and encourage appropriate mobility to improve upon JH-HLM goals.    Patient Centered Rounds: I performed bedside rounds with nursing staff today.   Discussions with Specialists or Other Care Team Provider: None    Education and Discussions with Family / Patient: Updated  (wife) at bedside.    Current Length of Stay: 4 day(s)  Current Patient Status: Inpatient   Certification Statement: The patient will continue to require additional inpatient hospital stay due to pending repeat NIKKI/LEAD of right leg s/p angiogram  Discharge Plan: Anticipate discharge in 48 hrs to home.    Code Status: Level 1 - Full Code    Subjective   Patient is seen at bedside this a.m., reports improvement in pain on left hand/wrist, now main symptom is numbness on fingers. Reports mild  pain in right thigh, denies numbness/tingling of RLE otherwise.    Objective :  Temp:  [97.6 °F (36.4 °C)-98.3 °F (36.8 °C)] 98.3 °F (36.8 °C)  HR:  [77-93] 86  BP: (143-165)/(82-85) 143/82  Resp:  [16-18] 16  SpO2:  [96 %-98 %] 96 %  O2 Device: None (Room air)    Body mass index is 41.57 kg/m².     Input and Output Summary (last 24 hours):     Intake/Output Summary (Last 24 hours) at 5/31/2025 1321  Last data filed at 5/31/2025 0812  Gross per 24 hour   Intake 787 ml   Output 950 ml   Net -163 ml       Physical Exam  Constitutional:       General: She is not in acute distress.     Appearance: She is obese. She is not ill-appearing, toxic-appearing or diaphoretic.     Cardiovascular:      Rate and Rhythm: Normal rate and regular rhythm.      Pulses: Normal pulses.      Heart sounds: Normal heart sounds.   Pulmonary:      Effort: Pulmonary effort is normal. No respiratory distress.      Breath sounds: Normal breath sounds.   Abdominal:      General: There is no distension.      Palpations: Abdomen is soft.      Tenderness: There is no abdominal tenderness.     Musculoskeletal:         General: No swelling or tenderness.      Comments: ROM intact on left hand/wrist, nontender to palpation.     Skin:     General: Skin is warm.     Neurological:      General: No focal deficit present.      Mental Status: She is alert.     Psychiatric:         Mood and Affect: Mood normal.         Behavior: Behavior normal.           Lines/Drains:              Lab Results: I have reviewed the following results:   Results from last 7 days   Lab Units 05/31/25 0458 05/30/25  0523   WBC Thousand/uL 7.49 6.64   HEMOGLOBIN g/dL 11.8 11.9   HEMATOCRIT % 34.3* 34.3*   PLATELETS Thousands/uL 323 326   SEGS PCT %  --  48   LYMPHO PCT %  --  39   MONO PCT %  --  6   EOS PCT %  --  6     Results from last 7 days   Lab Units 05/31/25  0458   SODIUM mmol/L 137   POTASSIUM mmol/L 4.1   CHLORIDE mmol/L 103   CO2 mmol/L 27   BUN mg/dL 18   CREATININE  mg/dL 0.54*   ANION GAP mmol/L 7   CALCIUM mg/dL 8.1*   ALBUMIN g/dL 3.2*   TOTAL BILIRUBIN mg/dL 0.25   ALK PHOS U/L 106*   ALT U/L 20   AST U/L 16   GLUCOSE RANDOM mg/dL 248*     Results from last 7 days   Lab Units 05/30/25  0523   INR  0.95     Results from last 7 days   Lab Units 05/31/25  1104 05/31/25  0723 05/30/25  2025 05/30/25  1643 05/30/25  0007 05/29/25  2112 05/29/25  1632 05/29/25  1059 05/29/25  0702 05/28/25  2037 05/28/25  1614 05/28/25  1131   POC GLUCOSE mg/dl 157* 279* 225* 234* 238* 255* 276* 167* 275* 169* 334* 222*     Results from last 7 days   Lab Units 05/26/25  0321   HEMOGLOBIN A1C % 13.3*     Results from last 7 days   Lab Units 05/26/25  0321   LACTIC ACID mmol/L 1.5       Recent Cultures (last 7 days):         Imaging Results Review: I reviewed radiology reports from this admission including: MRI spine and procedure reports.  Other Study Results Review: No additional pertinent studies reviewed.    Last 24 Hours Medication List:     Current Facility-Administered Medications:     acetaminophen (TYLENOL) tablet 975 mg, Q8H ABDULKADIR    albuterol (PROVENTIL HFA,VENTOLIN HFA) inhaler 1 puff, Q4H PRN    alteplase (CATHFLO) injection, PRN    Artificial Tears Op Soln 2 drop, Q6H PRN    aspirin chewable tablet 81 mg, Daily    atorvastatin (LIPITOR) tablet 80 mg, Daily With Dinner    cyclobenzaprine (FLEXERIL) tablet 10 mg, TID    diphenhydrAMINE (BENADRYL) injection, PRN    DULoxetine (CYMBALTA) delayed release capsule 60 mg, Daily    fentaNYL injection, PRN    Fluticasone Furoate-Vilanterol 100-25 mcg/actuation 1 puff, Daily    gabapentin (NEURONTIN) capsule 100 mg, TID    heparin (porcine) injection, PRN    hydrALAZINE (APRESOLINE) injection 5 mg, Q6H PRN    hydrOXYzine HCL (ATARAX) tablet 25 mg, BID PRN    insulin glargine (LANTUS) subcutaneous injection 32 Units 0.32 mL, Q12H ABDULKADIR    insulin lispro (HumALOG/ADMELOG) 100 units/mL subcutaneous injection 1-5 Units, HS    insulin lispro  (HumALOG/ADMELOG) 100 units/mL subcutaneous injection 1-6 Units, TID AC **AND** Fingerstick Glucose (POCT), TID AC    lidocaine 1% buffered, PRN    lisinopril (ZESTRIL) tablet 20 mg, Daily    magnesium Oxide (MAG-OX) tablet 400 mg, BID    midazolam (VERSED) injection, PRN    nicotine (NICODERM CQ) 21 mg/24 hr TD 24 hr patch 1 patch, Daily    oxyCODONE (ROXICODONE) IR tablet 2.5 mg, Q6H PRN    oxyCODONE (ROXICODONE) IR tablet 5 mg, Q6H PRN    prazosin (MINIPRESS) capsule 1 mg, HS    rivaroxaban (XARELTO) tablet 20 mg, Daily With Breakfast    Administrative Statements   Today, Patient Was Seen By: Shanthi Lane PA-C  I have spent a total time of 45 minutes in caring for this patient on the day of the visit/encounter including Documenting in the medical record, Reviewing/placing orders in the medical record (including tests, medications, and/or procedures), and Communicating with other healthcare professionals .    **Please Note: This note may have been constructed using a voice recognition system.**

## 2025-06-01 LAB
GLUCOSE SERPL-MCNC: 228 MG/DL (ref 65–140)
GLUCOSE SERPL-MCNC: 248 MG/DL (ref 65–140)
GLUCOSE SERPL-MCNC: 263 MG/DL (ref 65–140)
GLUCOSE SERPL-MCNC: 94 MG/DL (ref 65–140)

## 2025-06-01 PROCEDURE — 82948 REAGENT STRIP/BLOOD GLUCOSE: CPT

## 2025-06-01 PROCEDURE — 99232 SBSQ HOSP IP/OBS MODERATE 35: CPT | Performed by: PHYSICIAN ASSISTANT

## 2025-06-01 RX ORDER — INSULIN GLARGINE 100 [IU]/ML
35 INJECTION, SOLUTION SUBCUTANEOUS EVERY 12 HOURS SCHEDULED
Status: DISCONTINUED | OUTPATIENT
Start: 2025-06-01 | End: 2025-06-02 | Stop reason: HOSPADM

## 2025-06-01 RX ORDER — GABAPENTIN 300 MG/1
300 CAPSULE ORAL 3 TIMES DAILY
Status: DISCONTINUED | OUTPATIENT
Start: 2025-06-01 | End: 2025-06-02 | Stop reason: HOSPADM

## 2025-06-01 RX ADMIN — INSULIN GLARGINE 35 UNITS: 100 INJECTION, SOLUTION SUBCUTANEOUS at 21:10

## 2025-06-01 RX ADMIN — CYCLOBENZAPRINE 10 MG: 10 TABLET, FILM COATED ORAL at 17:17

## 2025-06-01 RX ADMIN — INSULIN LISPRO 3 UNITS: 100 INJECTION, SOLUTION INTRAVENOUS; SUBCUTANEOUS at 11:52

## 2025-06-01 RX ADMIN — ACETAMINOPHEN 975 MG: 325 TABLET, FILM COATED ORAL at 21:11

## 2025-06-01 RX ADMIN — INSULIN GLARGINE 32 UNITS: 100 INJECTION, SOLUTION SUBCUTANEOUS at 09:38

## 2025-06-01 RX ADMIN — GABAPENTIN 100 MG: 100 CAPSULE ORAL at 09:39

## 2025-06-01 RX ADMIN — RIVAROXABAN 20 MG: 20 TABLET, FILM COATED ORAL at 09:38

## 2025-06-01 RX ADMIN — ACETAMINOPHEN 975 MG: 325 TABLET, FILM COATED ORAL at 05:39

## 2025-06-01 RX ADMIN — NICOTINE 1 PATCH: 21 PATCH, EXTENDED RELEASE TRANSDERMAL at 09:47

## 2025-06-01 RX ADMIN — ATORVASTATIN CALCIUM 80 MG: 40 TABLET, FILM COATED ORAL at 17:17

## 2025-06-01 RX ADMIN — GABAPENTIN 300 MG: 300 CAPSULE ORAL at 21:12

## 2025-06-01 RX ADMIN — ASPIRIN 81 MG 81 MG: 81 TABLET ORAL at 09:39

## 2025-06-01 RX ADMIN — LISINOPRIL 20 MG: 20 TABLET ORAL at 09:39

## 2025-06-01 RX ADMIN — Medication 400 MG: at 09:39

## 2025-06-01 RX ADMIN — DULOXETINE HYDROCHLORIDE 60 MG: 60 CAPSULE, DELAYED RELEASE ORAL at 09:39

## 2025-06-01 RX ADMIN — INSULIN LISPRO 2 UNITS: 100 INJECTION, SOLUTION INTRAVENOUS; SUBCUTANEOUS at 21:11

## 2025-06-01 RX ADMIN — PRAZOSIN HYDROCHLORIDE 1 MG: 1 CAPSULE ORAL at 21:12

## 2025-06-01 RX ADMIN — Medication 400 MG: at 17:17

## 2025-06-01 RX ADMIN — OXYCODONE 5 MG: 5 TABLET ORAL at 02:39

## 2025-06-01 RX ADMIN — GABAPENTIN 300 MG: 300 CAPSULE ORAL at 17:17

## 2025-06-01 RX ADMIN — CYCLOBENZAPRINE 10 MG: 10 TABLET, FILM COATED ORAL at 09:39

## 2025-06-01 RX ADMIN — CYCLOBENZAPRINE 10 MG: 10 TABLET, FILM COATED ORAL at 21:12

## 2025-06-01 RX ADMIN — INSULIN LISPRO 3 UNITS: 100 INJECTION, SOLUTION INTRAVENOUS; SUBCUTANEOUS at 09:40

## 2025-06-01 NOTE — ASSESSMENT & PLAN NOTE
Patient presented with distal left arm pain/numbness/weakness since 2 AM on 5/26. Patient reports having numbness on bilateral 5th fingers for the past month, however weakness & pain from left elbow down to left hand is new.  Initial concern for stroke given prior hx. not a candidate for TNK, reports taking her Xarelto at around 2:30 AM PTA.  CTA head/neck: No acute intracranial hemorrhage, sequela of old infarction left thalamus, left caudate and left ganglial capsular region. No large vessel arterial occlusion, significant stenosis or focal aneurysm.  MRI brain showed no evidence of acute infarction  MRI C-spine with no disc herniation, canal or foraminal stenosis. XR left hand, wrist & forearm unremarkable.  Received aspirin 325 mg in ED. Restarted baby ASA, Xarelto & atorvastatin at higher dose.  Neurology signed off, symptoms likely due to neuropathy.  Orthopedic surgery signed off, could consider EMG/neuromuscular evaluation in 4-6 weeks.  Pain management with Tylenol, Flexeril; PRN oxycodone; completed IV Toradol; increased gabapentin to 300 mg TID.

## 2025-06-01 NOTE — ASSESSMENT & PLAN NOTE
History of PAD, known CLTI s/p left fem-BK pop bypass which subsequently occluded; s/p L AKA for nonsalvageable LLE in 2023  Last LEAD (3/21/25) revealed diffuse disease, significant decrease in right NIKKI now in severe range at 0.57.  S/p boomerang transfer to [facility] for angiogram (5/30): RLE arteriogram showed multifocal critical stenosis involving entirety of SFA and popliteal; s/p embolization with tPA.  Vascular surgery following, Xarelto resumed & await repeat LEAD/NIKKI on Monday 6/2

## 2025-06-01 NOTE — PROGRESS NOTES
Progress Note - Hospitalist   Name: Tony Roberto 39 y.o. female I MRN: [id number]  Unit/Bed#: -[facility] I Date of Admission: 5/26/2025   Date of Service: 6/1/2025 I Hospital Day: 5    Assessment & Plan  Left hand pain  Patient presented with distal left arm pain/numbness/weakness since 2 AM on 5/26. Patient reports having numbness on bilateral 5th fingers for the past month, however weakness & pain from left elbow down to left hand is new.  Initial concern for stroke given prior hx. not a candidate for TNK, reports taking her Xarelto at around 2:30 AM PTA.  CTA head/neck: No acute intracranial hemorrhage, sequela of old infarction left thalamus, left caudate and left ganglial capsular region. No large vessel arterial occlusion, significant stenosis or focal aneurysm.  MRI brain showed no evidence of acute infarction  MRI C-spine with no disc herniation, canal or foraminal stenosis. XR left hand, wrist & forearm unremarkable.  Received aspirin 325 mg in ED. Restarted baby ASA, Xarelto & atorvastatin at higher dose.  Neurology signed off, symptoms likely due to neuropathy.  Orthopedic surgery signed off, could consider EMG/neuromuscular evaluation in 4-6 weeks.  Pain management with Tylenol, Flexeril; PRN oxycodone; completed IV Toradol; increased gabapentin to 300 mg TID.  Peripheral arterial disease (HCC)  History of PAD, known CLTI s/p left fem-BK pop bypass which subsequently occluded; s/p L AKA for nonsalvageable LLE in 2023  Last LEAD (3/21/25) revealed diffuse disease, significant decrease in right NIKKI now in severe range at 0.57.  S/p boomerang transfer to [facility] for angiogram (5/30): RLE arteriogram showed multifocal critical stenosis involving entirety of SFA and popliteal; s/p embolization with tPA.  Vascular surgery following, Xarelto resumed & await repeat LEAD/NIKKI on Monday 6/2  Type 2 diabetes mellitus with diabetic polyneuropathy, with long-term current use of insulin (HCC)  Lab Results   Component  Value Date    HGBA1C 13.3 (H) 05/26/2025     Recent Labs     05/31/25  1527 05/31/25  2111 06/01/25  0723 06/01/25  1107   POCGLU 113 231* 263* 248*     Blood Sugar Average: Last 72 hrs:  (P) [id number]    Poorly controlled T2DM per most recent HgbA1c 11. Patient admits to noncompliance with insulin/medications, notes difficulty with affording and running out of supplies for glucometer & recently applied for Medicaid.  Updated HgbA1c 13.3, very poorly controlled T2DM.  Prior home regimen: Lantus 30U BID, Humalog SSI 4-16U TID, Trulicity weekly injections.   Increase Lantus 32->35U BID with ongoing hyperglycemia, will need prescription/price check for diabetes supplies prior to discharge  SSI coverage with Accu-Cheks [employer]  Carb controlled diet, hypoglycemia protocol  Wound of right foot  Wound on right lateral ankle and right first metatarsal base POA, patient reports sustaining right ankle wound from scraping on her wheelchair but has had poor healing  XR right foot/ankle with no acute osseous abnormalities  Does not appear infectious, poor wound healing with PAD  Podiatry following, recommend local wound care with hydrogel with silver & dry dressing daily & F/U outpatient in 1 week  WBAT to RLE, continue local wound care  History of CVA (cerebrovascular accident)  History of prior strokes, initially found to have evidence of chronic infarcts on CT head in December 2024  Suspect possibly related to medication noncompliance, poorly controlled HTN and T2DM  Echo: EF 60%, normal systolic and diastolic function. No PFO with bubble study.  MRI brain this admission with no acute stroke  Updated lipid panel: , , HDL 50, .  Continue home baby aspirin, Xarelto restarted per vascular recommendations, increased atorvastatin to 80 mg daily  Essential hypertension  Hypertensive urgency on admission, BP up to 200/110s  Reports noncompliance with home antihypertensives  BP reviewed, HTN  improving  Continue home lisinopril 20 mg daily, prazosin 1 mg qHS  History of substance use  History of cocaine use, patient admits to smoking crack cocaine 4 days PTA & marijuana 2 days PTA  UDS: + Cocaine and THC. No signs of withdrawal.  Strongly encouraged cessation of substance use  Class 3 severe obesity due to excess calories without serious comorbidity with body mass index (BMI) of 45.0 to 49.9 in adult  BMI 41.57, affects all aspects of care  Strongly encourage dietary/lifestyle modifications  COPD (chronic obstructive pulmonary disease)/asthma  Not in acute exacerbation, currently stable on room air  Continue daily maintenance inhaler, PRN albuterol  S/P AKA (above knee amputation) unilateral, left (HCC)  S/p left AKA on 3/29/2023, wheelchair-bound at baseline  Tobacco abuse  Nicotine patches, smoking cessation education    VTE Pharmacologic Prophylaxis: VTE Score: 3 Moderate Risk (Score 3-4) - Pharmacological DVT Prophylaxis Ordered: rivaroxaban (Xarelto).    Mobility:   Basic Mobility Inpatient Raw Score: 16  JH-HLM Goal: 5: Stand one or more mins  JH-HLM Achieved: 4: Move to chair/commode  JH-HLM Goal NOT achieved. Continue with multidisciplinary rounding and encourage appropriate mobility to improve upon JH-HLM goals.    Patient Centered Rounds: I performed bedside rounds with nursing staff today.   Discussions with Specialists or Other Care Team Provider: None    Education and Discussions with Family / Patient: Updated  (wife) at bedside.    Current Length of Stay: 5 day(s)  Current Patient Status: Inpatient   Certification Statement: The patient will continue to require additional inpatient hospital stay due to pending repeat arterial duplex s/p angiogram RLE  Discharge Plan: Anticipate discharge tomorrow to home.    Code Status: Level 1 - Full Code    Subjective   Patient is seen at bedside this a.m., reports ongoing pain in right hand with numbness/tingling on 2nd and 4th fingers  especially, though does note sleeping on left side.    Objective :  Temp:  [97.8 °F (36.6 °C)-98.6 °F (37 °C)] 98.6 °F (37 °C)  HR:  [81-86] 86  BP: (134-146)/(73-92) 143/86  Resp:  [16-18] 16  SpO2:  [96 %-100 %] 100 %  O2 Device: None (Room air)    Body mass index is 41.57 kg/m².     Input and Output Summary (last 24 hours):     Intake/Output Summary (Last 24 hours) at 6/1/2025 1402  Last data filed at 6/1/2025 0501  Gross per 24 hour   Intake 875 ml   Output 750 ml   Net 125 ml       Physical Exam  Constitutional:       General: She is not in acute distress.     Appearance: She is obese. She is not ill-appearing, toxic-appearing or diaphoretic.     Cardiovascular:      Rate and Rhythm: Normal rate and regular rhythm.      Pulses: Normal pulses.      Heart sounds: Normal heart sounds.   Pulmonary:      Effort: Pulmonary effort is normal. No respiratory distress.      Breath sounds: Normal breath sounds.   Abdominal:      General: There is no distension.      Palpations: Abdomen is soft.      Tenderness: There is no abdominal tenderness.     Musculoskeletal:         General: No swelling or tenderness.      Comments: ROM of right hand slightly limited by pain, some difficulty with making a fist.     Skin:     General: Skin is warm.      Findings: No erythema.      Comments: Small closed wound at base of right great toe, nontender with no surrounding erythema or edema. Wound on the lateral right ankle with no purulent drainage, active bleeding or surrounding erythema or edema.     Neurological:      General: No focal deficit present.      Mental Status: She is alert.     Psychiatric:         Mood and Affect: Mood normal.         Behavior: Behavior normal.           Lines/Drains:              Lab Results: I have reviewed the following results:   Results from last 7 days   Lab Units 05/31/25  0458 05/30/25  0523   WBC Thousand/uL 7.49 6.64   HEMOGLOBIN g/dL 11.8 11.9   HEMATOCRIT % 34.3* 34.3*   PLATELETS Thousands/uL  323 326   SEGS PCT %  --  48   LYMPHO PCT %  --  39   MONO PCT %  --  6   EOS PCT %  --  6     Results from last 7 days   Lab Units 05/31/25  0458   SODIUM mmol/L 137   POTASSIUM mmol/L 4.1   CHLORIDE mmol/L 103   CO2 mmol/L 27   BUN mg/dL 18   CREATININE mg/dL 0.54*   ANION GAP mmol/L 7   CALCIUM mg/dL 8.1*   ALBUMIN g/dL 3.2*   TOTAL BILIRUBIN mg/dL 0.25   ALK PHOS U/L 106*   ALT U/L 20   AST U/L 16   GLUCOSE RANDOM mg/dL 248*     Results from last 7 days   Lab Units 05/30/25  0523   INR  0.95     Results from last 7 days   Lab Units 06/01/25  1107 06/01/25  0723 05/31/25  2111 05/31/25  1527 05/31/25  1104 05/31/25  0723 05/30/25  2025 05/30/25  1643 05/30/25  0007 05/29/25  2112 05/29/25  1632 05/29/25  1059   POC GLUCOSE mg/dl 248* 263* 231* 113 157* 279* 225* 234* 238* 255* 276* 167*     Results from last 7 days   Lab Units 05/26/25  0321   HEMOGLOBIN A1C % 13.3*     Results from last 7 days   Lab Units 05/26/25  0321   LACTIC ACID mmol/L 1.5       Recent Cultures (last 7 days):         Imaging Results Review: No pertinent imaging studies reviewed.  Other Study Results Review: No additional pertinent studies reviewed.    Last 24 Hours Medication List:     Current Facility-Administered Medications:     acetaminophen (TYLENOL) tablet 975 mg, Q8H ABDULKADIR    albuterol (PROVENTIL HFA,VENTOLIN HFA) inhaler 1 puff, Q4H PRN    alteplase (CATHFLO) injection, PRN    Artificial Tears Op Soln 2 drop, Q6H PRN    aspirin chewable tablet 81 mg, Daily    atorvastatin (LIPITOR) tablet 80 mg, Daily With Dinner    cyclobenzaprine (FLEXERIL) tablet 10 mg, TID    diphenhydrAMINE (BENADRYL) injection, PRN    DULoxetine (CYMBALTA) delayed release capsule 60 mg, Daily    fentaNYL injection, PRN    Fluticasone Furoate-Vilanterol 100-25 mcg/actuation 1 puff, Daily    gabapentin (NEURONTIN) capsule 300 mg, TID    heparin (porcine) injection, PRN    hydrALAZINE (APRESOLINE) injection 5 mg, Q6H PRN    hydrOXYzine HCL (ATARAX) tablet 25 mg,  BID PRN    insulin glargine (LANTUS) subcutaneous injection 35 Units 0.35 mL, Q12H ABDULKADIR    insulin lispro (HumALOG/ADMELOG) 100 units/mL subcutaneous injection 1-5 Units, HS    insulin lispro (HumALOG/ADMELOG) 100 units/mL subcutaneous injection 1-6 Units, TID AC **AND** Fingerstick Glucose (POCT), TID AC    lidocaine 1% buffered, PRN    lisinopril (ZESTRIL) tablet 20 mg, Daily    magnesium Oxide (MAG-OX) tablet 400 mg, BID    midazolam (VERSED) injection, PRN    nicotine (NICODERM CQ) 21 mg/24 hr TD 24 hr patch 1 patch, Daily    oxyCODONE (ROXICODONE) IR tablet 2.5 mg, Q6H PRN    oxyCODONE (ROXICODONE) IR tablet 5 mg, Q6H PRN    prazosin (MINIPRESS) capsule 1 mg, HS    rivaroxaban (XARELTO) tablet 20 mg, Daily With Breakfast    Administrative Statements   Today, Patient Was Seen By: Shanthi Lane PA-C  I have spent a total time of 45 minutes in caring for this patient on the day of the visit/encounter including Patient and family education, Documenting in the medical record, and Reviewing/placing orders in the medical record (including tests, medications, and/or procedures).    **Please Note: This note may have been constructed using a voice recognition system.**

## 2025-06-01 NOTE — PLAN OF CARE
Problem: PAIN - ADULT  Goal: Verbalizes/displays adequate comfort level or baseline comfort level  Description: Interventions:  - Encourage patient to monitor pain and request assistance  - Assess pain using appropriate pain scale  - Administer analgesics as ordered based on type and severity of pain and evaluate response  - Implement non-pharmacological measures as appropriate and evaluate response  - Consider cultural and social influences on pain and pain management  - Notify physician/advanced practitioner if interventions unsuccessful or patient reports new pain  - Educate patient/family on pain management process including their role and importance of  reporting pain   - Provide non-pharmacologic/complimentary pain relief interventions  Outcome: Progressing     Problem: INFECTION - ADULT  Goal: Absence or prevention of progression during hospitalization  Description: INTERVENTIONS:  - Assess and monitor for signs and symptoms of infection  - Monitor lab/diagnostic results  - Monitor all insertion sites, i.e. indwelling lines, tubes, and drains  - Monitor endotracheal if appropriate and nasal secretions for changes in amount and color  - [place] appropriate cooling/warming therapies per order  - Administer medications as ordered  - Instruct and encourage patient and family to use good hand hygiene technique  - Identify and instruct in appropriate isolation precautions for identified infection/condition  Outcome: Progressing     Problem: SAFETY ADULT  Goal: Maintain or return to baseline ADL function  Description: INTERVENTIONS:  -  Assess patient's ability to carry out ADLs; assess patient's baseline for ADL function and identify physical deficits which impact ability to perform ADLs (bathing, care of mouth/teeth, toileting, grooming, dressing, etc.)  - Assess/evaluate cause of self-care deficits   - Assess range of motion  - Assess patient's mobility; develop plan if impaired  - Assess patient's need for  assistive devices and provide as appropriate  - Encourage maximum independence but intervene and supervise when necessary  - Involve family in performance of ADLs  - Assess for home care needs following discharge   - Consider OT consult to assist with ADL evaluation and planning for discharge  - Provide patient education as appropriate  - Monitor functional capacity and physical performance, use of AM PAC & JH-HLM   - Monitor gait, balance and fatigue with ambulation    Outcome: Progressing     Problem: Knowledge Deficit  Goal: Patient/family/caregiver demonstrates understanding of disease process, treatment plan, medications, and discharge instructions  Description: Complete learning assessment and assess knowledge base.  Interventions:  - Provide teaching at level of understanding  - Provide teaching via preferred learning methods  Outcome: Progressing

## 2025-06-01 NOTE — ASSESSMENT & PLAN NOTE
Lab Results   Component Value Date    HGBA1C 13.3 (H) 05/26/2025     Recent Labs     05/31/25  1527 05/31/25  2111 06/01/25  0723 06/01/25  1107   POCGLU 113 231* 263* 248*     Blood Sugar Average: Last 72 hrs:  (P) [id number]    Poorly controlled T2DM per most recent HgbA1c 11. Patient admits to noncompliance with insulin/medications, notes difficulty with affording and running out of supplies for glucometer & recently applied for Medicaid.  Updated HgbA1c 13.3, very poorly controlled T2DM.  Prior home regimen: Lantus 30U BID, Humalog SSI 4-16U TID, Trulicity weekly injections.   Increase Lantus 32->35U BID with ongoing hyperglycemia, will need prescription/price check for diabetes supplies prior to discharge  SSI coverage with Accu-Cheks ACHS  Carb controlled diet, hypoglycemia protocol

## 2025-06-01 NOTE — PLAN OF CARE
Problem: PAIN - ADULT  Goal: Verbalizes/displays adequate comfort level or baseline comfort level  Description: Interventions:  - Encourage patient to monitor pain and request assistance  - Assess pain using appropriate pain scale  - Administer analgesics as ordered based on type and severity of pain and evaluate response  - Implement non-pharmacological measures as appropriate and evaluate response  - Consider cultural and social influences on pain and pain management  - Notify physician/advanced practitioner if interventions unsuccessful or patient reports new pain  - Educate patient/family on pain management process including their role and importance of  reporting pain   - Provide non-pharmacologic/complimentary pain relief interventions  6/1/2025 0535 by Kirti Houston RN  Outcome: Progressing  6/1/2025 0110 by Kirti Houston RN  Outcome: Progressing     Problem: INFECTION - ADULT  Goal: Absence or prevention of progression during hospitalization  Description: INTERVENTIONS:  - Assess and monitor for signs and symptoms of infection  - Monitor lab/diagnostic results  - Monitor all insertion sites, i.e. indwelling lines, tubes, and drains  - Monitor endotracheal if appropriate and nasal secretions for changes in amount and color  - [place] appropriate cooling/warming therapies per order  - Administer medications as ordered  - Instruct and encourage patient and family to use good hand hygiene technique  - Identify and instruct in appropriate isolation precautions for identified infection/condition  6/1/2025 0535 by Kirti Houston RN  Outcome: Progressing  6/1/2025 0110 by Kirti Houston RN  Outcome: Progressing     Problem: DISCHARGE PLANNING  Goal: Discharge to home or other facility with appropriate resources  Description: INTERVENTIONS:  - Identify barriers to discharge w/patient and caregiver  - Arrange for needed discharge resources and transportation as appropriate  - Identify discharge  learning needs (meds, wound care, etc.)  - Arrange for interpretive services to assist at discharge as needed  - Refer to Case Management Department for coordinating discharge planning if the patient needs post-hospital services based on physician/advanced practitioner order or complex needs related to functional status, cognitive ability, or social support system  6/1/2025 0535 by Kirti Houston RN  Outcome: Progressing  6/1/2025 0110 by Kirti Houston RN  Outcome: Progressing     Problem: Knowledge Deficit  Goal: Patient/family/caregiver demonstrates understanding of disease process, treatment plan, medications, and discharge instructions  Description: Complete learning assessment and assess knowledge base.  Interventions:  - Provide teaching at level of understanding  - Provide teaching via preferred learning methods  6/1/2025 0535 by Kirti Houston RN  Outcome: Progressing  6/1/2025 0110 by Kirti Houston RN  Outcome: Progressing     Problem: Activity Intolerance/Impaired Mobility  Goal: Mobility/activity is maintained at optimum level for patient  Description: Interventions:  - Assess and monitor patient  barriers to mobility and need for assistive/adaptive devices.  - Assess patient's emotional response to limitations.  - Collaborate with interdisciplinary team and initiate plans and interventions as ordered.  - Encourage independent activity per ability.  - Maintain proper body alignment.  - Perform active/passive rom as tolerated/ordered.  - Plan activities to conserve energy.  - Turn patient as appropriate  6/1/2025 0535 by Kirti Houston RN  Outcome: Progressing  6/1/2025 0110 by Kirti Houston RN  Outcome: Progressing

## 2025-06-01 NOTE — PLAN OF CARE
Problem: PAIN - ADULT  Goal: Verbalizes/displays adequate comfort level or baseline comfort level  Description: Interventions:  - Encourage patient to monitor pain and request assistance  - Assess pain using appropriate pain scale  - Administer analgesics as ordered based on type and severity of pain and evaluate response  - Implement non-pharmacological measures as appropriate and evaluate response  - Consider cultural and social influences on pain and pain management  - Notify physician/advanced practitioner if interventions unsuccessful or patient reports new pain  - Educate patient/family on pain management process including their role and importance of  reporting pain   - Provide non-pharmacologic/complimentary pain relief interventions  Outcome: Progressing     Problem: INFECTION - ADULT  Goal: Absence or prevention of progression during hospitalization  Description: INTERVENTIONS:  - Assess and monitor for signs and symptoms of infection  - Monitor lab/diagnostic results  - Monitor all insertion sites, i.e. indwelling lines, tubes, and drains  - Monitor endotracheal if appropriate and nasal secretions for changes in amount and color  - [place] appropriate cooling/warming therapies per order  - Administer medications as ordered  - Instruct and encourage patient and family to use good hand hygiene technique  - Identify and instruct in appropriate isolation precautions for identified infection/condition  Outcome: Progressing     Problem: DISCHARGE PLANNING  Goal: Discharge to home or other facility with appropriate resources  Description: INTERVENTIONS:  - Identify barriers to discharge w/patient and caregiver  - Arrange for needed discharge resources and transportation as appropriate  - Identify discharge learning needs (meds, wound care, etc.)  - Arrange for interpretive services to assist at discharge as needed  - Refer to Case Management Department for coordinating discharge planning if the patient needs  post-hospital services based on physician/advanced practitioner order or complex needs related to functional status, cognitive ability, or social support system  Outcome: Progressing     Problem: Knowledge Deficit  Goal: Patient/family/caregiver demonstrates understanding of disease process, treatment plan, medications, and discharge instructions  Description: Complete learning assessment and assess knowledge base.  Interventions:  - Provide teaching at level of understanding  - Provide teaching via preferred learning methods  Outcome: Progressing     Problem: Neurological Deficit  Goal: Neurological status is stable or improving  Description: Interventions:  - Monitor and assess patient's level of consciousness, motor function, sensory function, and level of assistance needed for ADLs.   - Monitor and report changes from baseline. Collaborate with interdisciplinary team to initiate plan and implement interventions as ordered.   - Provide and maintain a safe environment.  - Consider seizure precautions.  - Consider fall precautions.  - Consider aspiration precautions.  - Consider bleeding precautions.  Outcome: Progressing     Problem: Activity Intolerance/Impaired Mobility  Goal: Mobility/activity is maintained at optimum level for patient  Description: Interventions:  - Assess and monitor patient  barriers to mobility and need for assistive/adaptive devices.  - Assess patient's emotional response to limitations.  - Collaborate with interdisciplinary team and initiate plans and interventions as ordered.  - Encourage independent activity per ability.  - Maintain proper body alignment.  - Perform active/passive rom as tolerated/ordered.  - Plan activities to conserve energy.  - Turn patient as appropriate  Outcome: Progressing

## 2025-06-02 ENCOUNTER — APPOINTMENT (INPATIENT)
Dept: VASCULAR ULTRASOUND | Facility: HOSPITAL | Age: 40
DRG: 038 | End: 2025-06-02
Payer: COMMERCIAL

## 2025-06-02 VITALS
SYSTOLIC BLOOD PRESSURE: 143 MMHG | WEIGHT: 220.02 LBS | DIASTOLIC BLOOD PRESSURE: 81 MMHG | RESPIRATION RATE: 16 BRPM | HEIGHT: 61 IN | HEART RATE: 84 BPM | TEMPERATURE: 98.6 F | BODY MASS INDEX: 41.54 KG/M2 | OXYGEN SATURATION: 97 %

## 2025-06-02 LAB
GLUCOSE SERPL-MCNC: 155 MG/DL (ref 65–140)
GLUCOSE SERPL-MCNC: 181 MG/DL (ref 65–140)
GLUCOSE SERPL-MCNC: 189 MG/DL (ref 65–140)

## 2025-06-02 PROCEDURE — 93923 UPR/LXTR ART STDY 3+ LVLS: CPT

## 2025-06-02 PROCEDURE — 93923 UPR/LXTR ART STDY 3+ LVLS: CPT | Performed by: SURGERY

## 2025-06-02 PROCEDURE — 99239 HOSP IP/OBS DSCHRG MGMT >30: CPT | Performed by: PHYSICIAN ASSISTANT

## 2025-06-02 PROCEDURE — 82948 REAGENT STRIP/BLOOD GLUCOSE: CPT

## 2025-06-02 RX ORDER — CYCLOBENZAPRINE HCL 10 MG
10 TABLET ORAL 3 TIMES DAILY PRN
Qty: 30 TABLET | Refills: 0 | Status: SHIPPED | OUTPATIENT
Start: 2025-06-02

## 2025-06-02 RX ORDER — BLOOD-GLUCOSE METER
KIT MISCELLANEOUS
Qty: 1 KIT | Refills: 0 | Status: SHIPPED | OUTPATIENT
Start: 2025-06-02

## 2025-06-02 RX ORDER — GLUCOSAMINE HCL/CHONDROITIN SU 500-400 MG
CAPSULE ORAL
Qty: 200 EACH | Refills: 0 | Status: SHIPPED | OUTPATIENT
Start: 2025-06-02

## 2025-06-02 RX ORDER — PEN NEEDLE, DIABETIC 32GX 5/32"
NEEDLE, DISPOSABLE MISCELLANEOUS
Qty: 100 EACH | Refills: 0 | Status: SHIPPED | OUTPATIENT
Start: 2025-06-02

## 2025-06-02 RX ORDER — LANCETS 33 GAUGE
EACH MISCELLANEOUS
Qty: 200 EACH | Refills: 0 | Status: SHIPPED | OUTPATIENT
Start: 2025-06-02

## 2025-06-02 RX ORDER — BLOOD SUGAR DIAGNOSTIC
STRIP MISCELLANEOUS
Qty: 200 EACH | Refills: 0 | Status: SHIPPED | OUTPATIENT
Start: 2025-06-02

## 2025-06-02 RX ORDER — OXYCODONE HYDROCHLORIDE 5 MG/1
5 TABLET ORAL EVERY 6 HOURS PRN
Qty: 12 TABLET | Refills: 0 | Status: SHIPPED | OUTPATIENT
Start: 2025-06-02 | End: 2025-06-05

## 2025-06-02 RX ORDER — PRAZOSIN HYDROCHLORIDE 1 MG/1
1 CAPSULE ORAL
Qty: 30 CAPSULE | Refills: 0 | Status: SHIPPED | OUTPATIENT
Start: 2025-06-02 | End: 2025-07-02

## 2025-06-02 RX ORDER — ATORVASTATIN CALCIUM 80 MG/1
80 TABLET, FILM COATED ORAL
Qty: 30 TABLET | Refills: 0 | Status: SHIPPED | OUTPATIENT
Start: 2025-06-02 | End: 2025-07-02

## 2025-06-02 RX ORDER — ALBUTEROL SULFATE 90 UG/1
1 INHALANT RESPIRATORY (INHALATION) EVERY 4 HOURS PRN
Qty: 8.5 G | Refills: 0 | Status: SHIPPED | OUTPATIENT
Start: 2025-06-02

## 2025-06-02 RX ORDER — ASPIRIN 81 MG/1
81 TABLET, CHEWABLE ORAL DAILY
Qty: 30 TABLET | Refills: 0 | Status: SHIPPED | OUTPATIENT
Start: 2025-06-02 | End: 2025-07-02

## 2025-06-02 RX ORDER — INSULIN DEGLUDEC 100 U/ML
35 INJECTION, SOLUTION SUBCUTANEOUS EVERY 12 HOURS SCHEDULED
Qty: 21 ML | Refills: 0 | Status: SHIPPED | OUTPATIENT
Start: 2025-06-02

## 2025-06-02 RX ORDER — DULOXETIN HYDROCHLORIDE 30 MG/1
60 CAPSULE, DELAYED RELEASE ORAL DAILY
Qty: 60 CAPSULE | Refills: 0 | Status: SHIPPED | OUTPATIENT
Start: 2025-06-02 | End: 2025-07-02

## 2025-06-02 RX ORDER — LISINOPRIL 20 MG/1
20 TABLET ORAL DAILY
Qty: 30 TABLET | Refills: 0 | Status: SHIPPED | OUTPATIENT
Start: 2025-06-02 | End: 2025-07-02

## 2025-06-02 RX ORDER — LANOLIN ALCOHOL/MO/W.PET/CERES
400 CREAM (GRAM) TOPICAL 2 TIMES DAILY
Qty: 60 TABLET | Refills: 0 | Status: SHIPPED | OUTPATIENT
Start: 2025-06-02 | End: 2025-07-02

## 2025-06-02 RX ORDER — GABAPENTIN 300 MG/1
300 CAPSULE ORAL 3 TIMES DAILY
Qty: 90 CAPSULE | Refills: 0 | Status: SHIPPED | OUTPATIENT
Start: 2025-06-02 | End: 2025-07-02

## 2025-06-02 RX ORDER — FLUTICASONE PROPIONATE AND SALMETEROL XINAFOATE 115; 21 UG/1; UG/1
2 AEROSOL, METERED RESPIRATORY (INHALATION) 2 TIMES DAILY
Qty: 12 G | Refills: 0 | Status: SHIPPED | OUTPATIENT
Start: 2025-06-02

## 2025-06-02 RX ADMIN — CYCLOBENZAPRINE 10 MG: 10 TABLET, FILM COATED ORAL at 16:04

## 2025-06-02 RX ADMIN — Medication 400 MG: at 09:08

## 2025-06-02 RX ADMIN — INSULIN LISPRO 1 UNITS: 100 INJECTION, SOLUTION INTRAVENOUS; SUBCUTANEOUS at 12:32

## 2025-06-02 RX ADMIN — OXYCODONE 5 MG: 5 TABLET ORAL at 09:08

## 2025-06-02 RX ADMIN — ACETAMINOPHEN 975 MG: 325 TABLET, FILM COATED ORAL at 16:04

## 2025-06-02 RX ADMIN — ASPIRIN 81 MG 81 MG: 81 TABLET ORAL at 09:08

## 2025-06-02 RX ADMIN — CYCLOBENZAPRINE 10 MG: 10 TABLET, FILM COATED ORAL at 09:08

## 2025-06-02 RX ADMIN — GABAPENTIN 300 MG: 300 CAPSULE ORAL at 16:04

## 2025-06-02 RX ADMIN — LISINOPRIL 20 MG: 20 TABLET ORAL at 09:08

## 2025-06-02 RX ADMIN — INSULIN LISPRO 1 UNITS: 100 INJECTION, SOLUTION INTRAVENOUS; SUBCUTANEOUS at 09:10

## 2025-06-02 RX ADMIN — ACETAMINOPHEN 975 MG: 325 TABLET, FILM COATED ORAL at 06:22

## 2025-06-02 RX ADMIN — RIVAROXABAN 20 MG: 20 TABLET, FILM COATED ORAL at 09:08

## 2025-06-02 RX ADMIN — GABAPENTIN 300 MG: 300 CAPSULE ORAL at 09:08

## 2025-06-02 RX ADMIN — DULOXETINE HYDROCHLORIDE 60 MG: 60 CAPSULE, DELAYED RELEASE ORAL at 09:08

## 2025-06-02 RX ADMIN — INSULIN GLARGINE 35 UNITS: 100 INJECTION, SOLUTION SUBCUTANEOUS at 09:10

## 2025-06-02 NOTE — PLAN OF CARE
Problem: PAIN - ADULT  Goal: Verbalizes/displays adequate comfort level or baseline comfort level  Description: Interventions:  - Encourage patient to monitor pain and request assistance  - Assess pain using appropriate pain scale  - Administer analgesics as ordered based on type and severity of pain and evaluate response  - Implement non-pharmacological measures as appropriate and evaluate response  - Consider cultural and social influences on pain and pain management  - Notify physician/advanced practitioner if interventions unsuccessful or patient reports new pain  - Educate patient/family on pain management process including their role and importance of  reporting pain   - Provide non-pharmacologic/complimentary pain relief interventions  6/2/2025 0605 by Carroll Quiroz RN  Outcome: Progressing  6/2/2025 0605 by Carroll Quiroz RN  Outcome: Progressing  6/2/2025 0007 by Carroll Quiroz RN  Outcome: Progressing     Problem: INFECTION - ADULT  Goal: Absence or prevention of progression during hospitalization  Description: INTERVENTIONS:  - Assess and monitor for signs and symptoms of infection  - Monitor lab/diagnostic results  - Monitor all insertion sites, i.e. indwelling lines, tubes, and drains  - Monitor endotracheal if appropriate and nasal secretions for changes in amount and color  - [place] appropriate cooling/warming therapies per order  - Administer medications as ordered  - Instruct and encourage patient and family to use good hand hygiene technique  - Identify and instruct in appropriate isolation precautions for identified infection/condition  6/2/2025 0605 by Carroll Quiroz RN  Outcome: Progressing  6/2/2025 0605 by Carroll Quiroz RN  Outcome: Progressing  6/2/2025 0007 by Carroll Quiroz RN  Outcome: Progressing     Problem: SAFETY ADULT  Goal: Patient will remain free of falls  Description: INTERVENTIONS:  - Educate patient/family on patient safety including physical limitations  - Instruct  patient to call for assistance with activity   - Consider consulting OT/PT to assist with strengthening/mobility based on AM PAC & JH-HLM score  - Consult OT/PT to assist with strengthening/mobility   - Keep Call bell within reach  - Keep bed low and locked with side rails adjusted as appropriate  - Keep care items and personal belongings within reach  - Initiate and maintain comfort rounds  - Make Fall Risk Sign visible to staff  - Offer Toileting every 2 Hours, in advance of need  - Initiate/Maintain Bed alarm  - Obtain necessary fall risk management equipment:   - Apply yellow socks and bracelet for high fall risk patients  - Consider moving patient to room near nurses station  6/2/2025 0605 by Carroll Quiroz RN  Outcome: Progressing  6/2/2025 0605 by Carroll Quiroz RN  Outcome: Progressing  6/2/2025 0007 by Carroll Quiroz, RN  Outcome: Progressing

## 2025-06-02 NOTE — CASE MANAGEMENT
Case Management Progress Note    Patient name Tony Roberto  Location /-01 MRN [id number]  : 1985 Date 2025       LOS (days): 6  Geometric Mean LOS (GMLOS) (days): 2.8  Days to GMLOS:-3.2        OBJECTIVE:        Current admission status: Inpatient  Preferred Pharmacy:   [facility] [place]  [place], [place] - [address]  [address]  [place] [address]  Phone: [phone] Fax: [phone]    [facility], [place], [place] -  [facility]   [facility] [address]  Phone: [phone] Fax: [phone]    Primary Care Provider: Mati Johnson MD    Primary Insurance: [employer]  Secondary Insurance:     PROGRESS NOTE:      Cm called for price check on patients meds. Per pharmacy, patient has $0 copay. Provider updated.

## 2025-06-02 NOTE — DISCHARGE SUMMARY
Discharge Summary - Hospitalist   Name: Tony Roberto 39 y.o. female I MRN: [id number]  Unit/Bed#: -01 I Date of Admission: 5/26/2025   Date of Service: 6/2/2025 I Hospital Day: 6     Assessment & Plan  Left hand pain  Patient presented with distal left arm pain/numbness/weakness since 2 AM on 5/26. Patient reports having numbness on bilateral 5th fingers for the past month, however weakness & pain from left elbow down to left hand is new.  Likely due to compressive neuropathy with peripheral neuropathy from uncontrolled T2DM contributing  CTA head/neck: No acute intracranial hemorrhage, sequela of old infarction left thalamus, left caudate and left ganglial capsular region. No large vessel arterial occlusion, significant stenosis or focal aneurysm.  MRI brain showed no evidence of acute infarction  MRI C-spine with no disc herniation, canal or foraminal stenosis. XR left hand, wrist & forearm unremarkable.  Received aspirin 325 mg in ED. Restarted baby ASA, Xarelto & atorvastatin at higher dose.  Orthopedic surgery & neurology signed off, can F/U for neuromuscular evaluation in 4-6 weeks.  Pain management with Tylenol, Flexeril; PRN oxycodone;  increased gabapentin to 300 mg TID.  Peripheral arterial disease (HCC)  History of PAD, known CLTI s/p left fem-BK pop bypass which subsequently occluded; s/p L AKA for nonsalvageable LLE in 2023  Last LEAD (3/21/25) revealed diffuse disease, significant decrease in right NIKKI now in severe range at 0.57.  S/p boomerang transfer to [facility] for angiogram (5/30): RLE arteriogram showed multifocal critical stenosis involving entirety of SFA and popliteal; s/p embolization with tPA.  NIKKI/waveform analysis of RLE today, NIKKI 0.55 still in severe disease range though improved great toe pressures 57 mmHg compared to prior 50 mmHg.  Vascular surgery cleared for discharge, continue Xarelto & reinforced importance of outpatient F/U  Type 2 diabetes mellitus with diabetic  polyneuropathy, with long-term current use of insulin ([facility])  Lab Results   Component Value Date    HGBA1C 13.3 (H) 05/26/2025     Recent Labs     06/01/25  2058 06/02/25  0706 06/02/25  1105 06/02/25  1534   POCGLU 228* 181* 155* 189*     Blood Sugar Average: Last 72 hrs:  (P) 202.5    Poorly controlled T2DM per most recent HgbA1c 13. Patient admits to noncompliance with insulin/medications, notes difficulty with affording and running out of supplies for glucometer though recently applied for Medicaid.  Prior home regimen: Lantus 30U BID, Humalog SSI 4-16U TID, Trulicity weekly injections.   Continue Lantus 35U BID upon discharge - pen & glucometer supplies sent to pharmacy with $0 copay per CM & needs outpatient F/U with endocrinology  SSI coverage with Accu-Cheks ACHS  Carb controlled diet, hypoglycemia protocol  Wound of right foot  Wound on right lateral ankle and right first metatarsal base POA, patient reports sustaining right ankle wound from scraping on her wheelchair but has had poor healing  XR right foot/ankle with no acute osseous abnormalities  Does not appear infectious, poor wound healing with PAD  Podiatry following, recommend local wound care with hydrogel with silver & dry dressing daily - can F/U outpatient in 1 week  WBAT to RLE, continue local wound care  History of CVA (cerebrovascular accident)  History of prior strokes, initially found to have evidence of chronic infarcts on CT head in December 2024  Suspect possibly related to medication noncompliance, poorly controlled HTN and T2DM  Echo: EF 60%, normal systolic and diastolic function. No PFO with bubble study.  MRI brain this admission with no acute stroke  Updated lipid panel: , , HDL 50, .  Continue home baby aspirin, Xarelto restarted per vascular recommendations, increased atorvastatin to 80 mg daily  Essential hypertension  Hypertensive urgency on admission, BP up to 200/110s  Reports noncompliance with home  antihypertensives  BP reviewed, HTN improving  Continue home lisinopril 20 mg daily, prazosin 1 mg qHS  History of substance use  History of cocaine use, patient admits to smoking crack cocaine 4 days PTA & marijuana 2 days PTA  UDS: + Cocaine and THC. No signs of withdrawal.  Strongly encouraged cessation of substance use  Class 3 severe obesity due to excess calories without serious comorbidity with body mass index (BMI) of 45.0 to 49.9 in adult  BMI 41.57, affects all aspects of care  Strongly encourage dietary/lifestyle modifications  COPD (chronic obstructive pulmonary disease)/asthma  Not in acute exacerbation, currently stable on room air  Continue daily maintenance inhaler, PRN albuterol  S/P AKA (above knee amputation) unilateral, left (HCC)  S/p left AKA on 3/29/2023, wheelchair-bound at baseline  Tobacco abuse  Nicotine patches, smoking cessation education     Medical Problems       Resolved Problems  Date Reviewed: 8/13/2024   None       Discharging Physician / Practitioner: Shanthi Lane PA-C  PCP: Mati Johnson MD  Admission Date:   Admission Orders (From admission, onward)       Ordered        05/27/25 1305  INPATIENT ADMISSION  Once            05/26/25 0814  Place in Observation  Once                          Discharge Date: 06/02/25    Next Steps for Physician/AP Assuming Care:  Follow-up with neurology in 4-6 weeks for neuromuscular evaluation  Follow-up with endocrinology for management of type 2 diabetes  Follow-up with vascular surgery for continued management of peripheral arterial disease.  Follow-up with podiatry for management of wounds on right foot/ankle    Test Results Pending at Discharge (will require follow up):  None    Medication Changes for Discharge & Rationale:   Start Tresiba 35 units BID (for diabetes)  Increase atorvastatin to 80 mg daily (for high cholesterol)  Restart gabapentin 300 mg TID (for neuropathy)  Take Flexeril 10 mg TID PRN, oxycodone 5 mg q6hrs PRN (for  neuropathy pain)  See after visit summary for reconciled discharge medications provided to patient and/or family.     Consultations During Hospital Stay:  Vascular surgery  Neurology  Interventional radiology  Podiatry  Orthopedic surgery    Procedures Performed:   S/p right lower extremity arteriogram (5/30/25): Multifocal critical stenoses involving the entirety of the SFA and popliteal (including eccentric thrombus in the proximal pop). 3 vessel tibial runoff with small vessel foot arteriopathy and an incomplete pedal plantar arch. 4 and 5 mm DCB of the entirety of the SFA and P1 segment. Small amount of distal embolization into the peroneal and distal PT, treated with tPA infusion. Starclose L CFA for closure. 100 ml contrast.    Significant Findings / Test Results:   CTA head/neck: No acute intracranial hemorrhage.  Sequela of old infarcts in left thalamus, left caudate and left ganglial capsular region again seen. Unremarkable CTA neck and brain without large vessel arterial occlusion, significant flow limiting stenosis or focal saccular aneurysm.  MRI brain: No acute infarction.  Chronic microangiopathic changes and multifocal chronic infarctions.  MRI cervical spine: No disc herniation, canal or foraminal stenosis.  XR right foot, ankle: No acute osseous abnormality.  XR left hand, wrist, forearm: No acute osseous abnormality.  VAS NIKKI and waveform analysis RLE (post angiogram): Ankle/Brachial Index: 0.55 which is in the severe disease range. (Prior 0.57). PPG/PVR Tracings are dampened. Monophasic waveform's at the ankle. Metatarsal Pressure 89 mm Hg. Great Toe Pressure: 57 mm Hg, within the healing range for a diabetic. (Prior 50 mm Hg).  Echo:  Left Ventricle: Left ventricular cavity size is normal. Wall thickness is increased. There is mild concentric hypertrophy. The left ventricular ejection fraction is 60%. Systolic function is normal. Wall motion is normal. Diastolic function is normal.  Atrial  Septum: No patent foramen ovale detected at rest using color doppler and using agitated saline contrast, or by provocation with cough and valsalva using agitated saline contrast.  Mitral Valve: There is mild regurgitation.  Prior TTE study available for comparison. Prior study date: 9/10/2021. No significant changes noted compared to the prior study.  No diagnostic echocardiographic evidence of intracardiac thrombus or intracardiac shunt.    Lipid panel: , , HDL 50, .  Hemoglobin A1c: 13.3.    Incidental Findings:   None     Hospital Course:   Tony Roberto is a 39 y.o. female patient, PMH obesity, PAD s/p left AKA, uncontrolled T2DM, history of CVA, obesity, HTN, substance use, COPD, tobacco abuse, who originally presented to the hospital on 5/26/2025 due to left hand pain, numbness and weakness with radiation to left elbow. On arrival to ED, CTA head/neck did not show any acute abnormalities though sequela of old infarcts noted.  Patient was started on stroke pathway given prior stroke history, though MRI brain showed no acute infarction and MRI cervical spine with no abnormality suggestive of radiculopathy.  Suspect patient's symptoms are due to compressive neuropathy with peripheral neuropathy from poorly controlled T2DM contributing. Neurology and orthopedic surgery were consulted, recommended outpatient follow-up for neuromuscular evaluation.  Patient will also need outpatient follow-up with endocrinology, prescription sent for Tresiba pen and glucometer supplies.    In addition, podiatry and vascular surgery was also consulted due to presence of noninfectious wounds on right foot and ankle with most recent LEAD showing NIKKI in severe range.  IR was consulted, [facility] transfer to [facility] occurred on 5/30 to perform angiogram revealing critical aortic stenosis requiring angioplasty.  Patient's follow-up NIKKI was stable, recommend continuing home Xarelto, aspirin and statin  "with close outpatient follow-up with vascular surgery.    No notes on file    Please see above list of diagnoses and related plan for additional information.     Discharge Day Visit / Exam:   Subjective: Patient is seen at bedside this afternoon, denies any pain in right leg/foot, does note ongoing pain and numbness in left hand though is tolerable.  Patient agreeable with plan for discharge home, reinforced importance of outpatient follow-up with PCP, neurology, endocrinology and vascular surgery.  Vitals: Blood Pressure: 143/81 (06/02/25 1507)  Pulse: 84 (06/02/25 1507)  Temperature: 98.6 °F (37 °C) (06/02/25 1507)  Temp Source: Oral (06/02/25 1507)  Respirations: 16 (06/02/25 1507)  Height: 5' 1\" (154.9 cm) (05/27/25 1600)  Weight - Scale: 99.8 kg (220 lb 0.3 oz) (05/27/25 1600)  SpO2: 97 % (06/02/25 1507)  Physical Exam  Constitutional:       General: She is not in acute distress.     Appearance: She is not ill-appearing, toxic-appearing or diaphoretic.     Cardiovascular:      Rate and Rhythm: Normal rate and regular rhythm.      Pulses: Normal pulses.      Heart sounds: Normal heart sounds.   Pulmonary:      Effort: Pulmonary effort is normal. No respiratory distress.      Breath sounds: Normal breath sounds.   Abdominal:      General: There is no distension.      Palpations: Abdomen is soft.      Tenderness: There is no abdominal tenderness.     Musculoskeletal:         General: No swelling or tenderness.      Comments: S/p left AKA. ROM of left hand/wrist slightly limited by pain, 2+ left radial and brachial pulses.     Skin:     General: Skin is warm.     Neurological:      General: No focal deficit present.      Mental Status: She is alert.     Psychiatric:         Mood and Affect: Mood normal.         Behavior: Behavior normal.          Discussion with Family: Updated  (wife) at bedside.    Discharge instructions/Information to patient and family:   See after visit summary for information " provided to patient and family.      Provisions for Follow-Up Care:  See after visit summary for information related to follow-up care and any pertinent home health orders.      Mobility at time of Discharge:   Basic Mobility Inpatient Raw Score: 16  JH-HLM Goal: 5: Stand one or more mins  JH-HLM Achieved: 4: Move to chair/commode  HLM Goal NOT achieved. Continue to encourage mobility in post discharge setting.     Disposition:   Home    Planned Readmission: None    Administrative Statements   Discharge Statement:  I have spent a total time of 55 minutes in caring for this patient on the day of the visit/encounter. >30 minutes of time was spent on: Patient and family education, Counseling / Coordination of care, Documenting in the medical record, Reviewing / ordering tests, medicine, procedures  , and Communicating with other healthcare professionals .    **Please Note: This note may have been constructed using a voice recognition system**

## 2025-06-02 NOTE — ASSESSMENT & PLAN NOTE
Patient presented with distal left arm pain/numbness/weakness since 2 AM on 5/26. Patient reports having numbness on bilateral 5th fingers for the past month, however weakness & pain from left elbow down to left hand is new.  Likely due to compressive neuropathy with peripheral neuropathy from uncontrolled T2DM contributing  CTA head/neck: No acute intracranial hemorrhage, sequela of old infarction left thalamus, left caudate and left ganglial capsular region. No large vessel arterial occlusion, significant stenosis or focal aneurysm.  MRI brain showed no evidence of acute infarction  MRI C-spine with no disc herniation, canal or foraminal stenosis. XR left hand, wrist & forearm unremarkable.  Received aspirin 325 mg in ED. Restarted baby ASA, Xarelto & atorvastatin at higher dose.  Orthopedic surgery & neurology signed off, can F/U for neuromuscular evaluation in 4-6 weeks.  Pain management with Tylenol, Flexeril; PRN oxycodone;  increased gabapentin to 300 mg TID.

## 2025-06-02 NOTE — CASE MANAGEMENT
Case Management Progress Note    Patient name Tony Roberto  Location /-01 MRN [id number]  : 1985 Date 2025       LOS (days): 6  Geometric Mean LOS (GMLOS) (days): 2.8  Days to GMLOS:-3.1        OBJECTIVE:        Current admission status: Inpatient  Preferred Pharmacy:   [facility] [place]  [place], [place] - [address]  [address]  [facility] [address]  Phone: [phone] Fax: [phone]    [facility], [place], [place] -  [facility]   [facility] [address]  Phone: [phone] Fax: [phone]    Primary Care Provider: Mati Johnson MD    Primary Insurance: [employer]  Secondary Insurance:     PROGRESS NOTE:    Cm met with patient and spouse to provide resources at bedside. Patient and spouse thankful. Cm also provided medical records phone number as requested by patient. Cm to follow for any CM needs.

## 2025-06-02 NOTE — CASE MANAGEMENT
Case Management Progress Note    Patient name Tony Roberto  Location /-01 MRN [id number]  : 1985 Date 2025       LOS (days): 6  Geometric Mean LOS (GMLOS) (days): 2.8  Days to GMLOS:-3.2        OBJECTIVE:        Current admission status: Inpatient  Preferred Pharmacy:   [facility] [place]  [place], [place] - [address]  [address]  [place] [address]  Phone: [phone] Fax: [phone]    [facility], [place], [place] -  [facility]   [facility] [address]  Phone: [phone] Fax: [phone]    Primary Care Provider: Mati Johnson MD    Primary Insurance: [employer]  Secondary Insurance:     PROGRESS NOTE:    Cm attempted price check at patients pharmacy, per tech they are still running meds and requesting call back.

## 2025-06-02 NOTE — DISCHARGE INSTR - AVS FIRST PAGE
Dear Tony Roberto,     It was our pleasure to care for you here at [facility].  It is our hope that we were always able to exceed the expected standards for your care during your stay.  You were hospitalized due to pain/numbness/weakness in the left hand due to neuropathy. You were cared for on the 3rd floor under the service of Shanthi Lane PA-C with the [facility] who covers for your primary care physician (PCP), Mati Johnson MD, while you were hospitalized.  If you have any questions or concerns related to this hospitalization, you may contact us at .  For follow up as well as medication refills, we recommend that you follow up with your primary care physician.  A registered nurse will reach out to you by phone within a few days after your discharge to answer any additional questions that you may have after going home.  However, at this time we provide for you here, the most important instructions / recommendations at discharge:     Notable Medication Adjustments -   Start long-acting/basal insulin with Tresiba 35 units twice a day (for diabetes)  Increase your atorvastatin to 80 mg daily (for high cholesterol)  Restart gabapentin 300 mg 3 times a day (for neuropathy)  Testing Required after Discharge -   Follow-up with neurology in 4-6 weeks for neuromuscular evaluation.  ** Please contact your PCP to request testing orders for any of the testing recommended here **  Important follow up information -   Follow-up with endocrinology for management of your type 2 diabetes.  Follow-up with vascular surgery for continued management of peripheral arterial disease.  Follow-up with podiatry for management of wounds on your right foot/ankle.  Other Instructions -   Recommend checking your blood sugar 4 times a day, before meals and at bedtime.  Please review this entire after visit summary as additional general instructions including  medication list, appointments, activity, diet, any pertinent wound care, and other additional recommendations from your care team that may be provided for you.      Sincerely,     Shanthi Lane PA-C

## 2025-06-02 NOTE — PLAN OF CARE
Problem: PAIN - ADULT  Goal: Verbalizes/displays adequate comfort level or baseline comfort level  Description: Interventions:  - Encourage patient to monitor pain and request assistance  - Assess pain using appropriate pain scale  - Administer analgesics as ordered based on type and severity of pain and evaluate response  - Implement non-pharmacological measures as appropriate and evaluate response  - Consider cultural and social influences on pain and pain management  - Notify physician/advanced practitioner if interventions unsuccessful or patient reports new pain  - Educate patient/family on pain management process including their role and importance of  reporting pain   - Provide non-pharmacologic/complimentary pain relief interventions  Outcome: Progressing     Problem: INFECTION - ADULT  Goal: Absence or prevention of progression during hospitalization  Description: INTERVENTIONS:  - Assess and monitor for signs and symptoms of infection  - Monitor lab/diagnostic results  - Monitor all insertion sites, i.e. indwelling lines, tubes, and drains  - Monitor endotracheal if appropriate and nasal secretions for changes in amount and color  - [place] appropriate cooling/warming therapies per order  - Administer medications as ordered  - Instruct and encourage patient and family to use good hand hygiene technique  - Identify and instruct in appropriate isolation precautions for identified infection/condition  Outcome: Progressing     Problem: SAFETY ADULT  Goal: Maintain or return to baseline ADL function  Description: INTERVENTIONS:  -  Assess patient's ability to carry out ADLs; assess patient's baseline for ADL function and identify physical deficits which impact ability to perform ADLs (bathing, care of mouth/teeth, toileting, grooming, dressing, etc.)  - Assess/evaluate cause of self-care deficits   - Assess range of motion  - Assess patient's mobility; develop plan if impaired  - Assess patient's need for  assistive devices and provide as appropriate  - Encourage maximum independence but intervene and supervise when necessary  - Involve family in performance of ADLs  - Assess for home care needs following discharge   - Consider OT consult to assist with ADL evaluation and planning for discharge  - Provide patient education as appropriate  - Monitor functional capacity and physical performance, use of AM PAC & JH-HLM   - Monitor gait, balance and fatigue with ambulation    Outcome: Progressing

## 2025-06-02 NOTE — ASSESSMENT & PLAN NOTE
Wound on right lateral ankle and right first metatarsal base POA, patient reports sustaining right ankle wound from scraping on her wheelchair but has had poor healing  XR right foot/ankle with no acute osseous abnormalities  Does not appear infectious, poor wound healing with PAD  Podiatry following, recommend local wound care with hydrogel with silver & dry dressing daily - can F/U outpatient in 1 week  WBAT to RLE, continue local wound care

## 2025-06-02 NOTE — ASSESSMENT & PLAN NOTE
History of PAD, known CLTI s/p left fem-BK pop bypass which subsequently occluded; s/p L AKA for nonsalvageable LLE in 2023  Last LEAD (3/21/25) revealed diffuse disease, significant decrease in right NIKKI now in severe range at 0.57.  S/p boomerang transfer to [facility] for angiogram (5/30): RLE arteriogram showed multifocal critical stenosis involving entirety of SFA and popliteal; s/p embolization with tPA.  NIKKI/waveform analysis of RLE today, NIKKI 0.55 still in severe disease range though improved great toe pressures 57 mmHg compared to prior 50 mmHg.  Vascular surgery cleared for discharge, continue Xarelto & reinforced importance of outpatient F/U

## 2025-06-02 NOTE — PLAN OF CARE
Problem: PAIN - ADULT  Goal: Verbalizes/displays adequate comfort level or baseline comfort level  Description: Interventions:  - Encourage patient to monitor pain and request assistance  - Assess pain using appropriate pain scale  - Administer analgesics as ordered based on type and severity of pain and evaluate response  - Implement non-pharmacological measures as appropriate and evaluate response  - Consider cultural and social influences on pain and pain management  - Notify physician/advanced practitioner if interventions unsuccessful or patient reports new pain  - Educate patient/family on pain management process including their role and importance of  reporting pain   - Provide non-pharmacologic/complimentary pain relief interventions  Outcome: Progressing     Problem: INFECTION - ADULT  Goal: Absence or prevention of progression during hospitalization  Description: INTERVENTIONS:  - Assess and monitor for signs and symptoms of infection  - Monitor lab/diagnostic results  - Monitor all insertion sites, i.e. indwelling lines, tubes, and drains  - Monitor endotracheal if appropriate and nasal secretions for changes in amount and color  - [place] appropriate cooling/warming therapies per order  - Administer medications as ordered  - Instruct and encourage patient and family to use good hand hygiene technique  - Identify and instruct in appropriate isolation precautions for identified infection/condition  Outcome: Progressing     Problem: SAFETY ADULT  Goal: Patient will remain free of falls  Description: INTERVENTIONS:  - Educate patient/family on patient safety including physical limitations  - Instruct patient to call for assistance with activity   - Consider consulting OT/PT to assist with strengthening/mobility based on AM PAC & JH-HLM score  - Consult OT/PT to assist with strengthening/mobility   - Keep Call bell within reach  - Keep bed low and locked with side rails adjusted as appropriate  - Keep  care items and personal belongings within reach  - Initiate and maintain comfort rounds  - Make Fall Risk Sign visible to staff  - Offer Toileting every 2 Hours, in advance of need  - Initiate/Maintain alarm  - Obtain necessary fall risk management equipment:   - Apply yellow socks and bracelet for high fall risk patients  - Consider moving patient to room near nurses station  Outcome: Progressing  Goal: Maintain or return to baseline ADL function  Description: INTERVENTIONS:  -  Assess patient's ability to carry out ADLs; assess patient's baseline for ADL function and identify physical deficits which impact ability to perform ADLs (bathing, care of mouth/teeth, toileting, grooming, dressing, etc.)  - Assess/evaluate cause of self-care deficits   - Assess range of motion  - Assess patient's mobility; develop plan if impaired  - Assess patient's need for assistive devices and provide as appropriate  - Encourage maximum independence but intervene and supervise when necessary  - Involve family in performance of ADLs  - Assess for home care needs following discharge   - Consider OT consult to assist with ADL evaluation and planning for discharge  - Provide patient education as appropriate  - Monitor functional capacity and physical performance, use of AM PAC & JH-HLM   - Monitor gait, balance and fatigue with ambulation    Outcome: Progressing  Goal: Maintains/Returns to pre admission functional level  Description: INTERVENTIONS:  - Perform AM-PAC 6 Click Basic Mobility/ Daily Activity assessment daily.  - Set and communicate daily mobility goal to care team and patient/family/caregiver.   - Collaborate with rehabilitation services on mobility goals if consulted  - Perform Range of Motion 4 times a day.  - Reposition patient every 2 hours.  - Dangle patient 3 times a day  - Stand patient 3 times a day  - Ambulate patient 3 times a day  - Out of bed to chair 3 times a day   - Out of bed for meals 3 times a day  - Out of  bed for toileting  - Record patient progress and toleration of activity level   Outcome: Progressing     Problem: DISCHARGE PLANNING  Goal: Discharge to home or other facility with appropriate resources  Description: INTERVENTIONS:  - Identify barriers to discharge w/patient and caregiver  - Arrange for needed discharge resources and transportation as appropriate  - Identify discharge learning needs (meds, wound care, etc.)  - Arrange for interpretive services to assist at discharge as needed  - Refer to Case Management Department for coordinating discharge planning if the patient needs post-hospital services based on physician/advanced practitioner order or complex needs related to functional status, cognitive ability, or social support system  Outcome: Progressing     Problem: Knowledge Deficit  Goal: Patient/family/caregiver demonstrates understanding of disease process, treatment plan, medications, and discharge instructions  Description: Complete learning assessment and assess knowledge base.  Interventions:  - Provide teaching at level of understanding  - Provide teaching via preferred learning methods  Outcome: Progressing     Problem: Prexisting or High Potential for Compromised Skin Integrity  Goal: Skin integrity is maintained or improved  Description: INTERVENTIONS:  - Identify patients at risk for skin breakdown  - Assess and monitor skin integrity including under and around medical devices   - Assess and monitor nutrition and hydration status  - Monitor labs  - Assess for incontinence   - Turn and reposition patient  - Assist with mobility/ambulation  - Relieve pressure over peg prominences   - Avoid friction and shearing  - Provide appropriate hygiene as needed including keeping skin clean and dry  - Evaluate need for skin moisturizer/barrier cream  - Collaborate with interdisciplinary team  - Patient/family teaching  - Consider wound care consult    Assess:  - Review Jose De Jesus scale daily  - Clean and  moisturize skin every 2 hours or as needed  - Inspect skin when repositioning, toileting, and assisting with ADLS  - Assess under medical devices such as  every   - Assess extremities for adequate circulation and sensation     Bed Management:  - Have minimal linens on bed & keep smooth, unwrinkled  - Change linens as needed when moist or perspiring  - Avoid sitting or lying in one position for more than 2 hours while in bed?Keep HOB at 30 degrees   - Toileting:  - Offer bedside commode  - Assess for incontinence every   - Use incontinent care products after each incontinent episode such as barrier cream    Activity:  - Mobilize patient 2 times a day  - Encourage activity and walks on unit  - Encourage or provide ROM exercises   - Turn and reposition patient every 2 Hours  - Use appropriate equipment to lift or move patient in bed  - Instruct/ Assist with weight shifting every  when out of bed in chair  - Consider limitation of chair time 1 hour intervals    Skin Care:  - Avoid use of baby powder, tape, friction and shearing, hot water or constrictive clothing  - Relieve pressure over bony prominences using   - Do not massage red bony areas    Next Steps:  - Teach patient strategies to minimize risks such as   - Consider consults to  interdisciplinary teams such as   Outcome: Progressing     Problem: Neurological Deficit  Goal: Neurological status is stable or improving  Description: Interventions:  - Monitor and assess patient's level of consciousness, motor function, sensory function, and level of assistance needed for ADLs.   - Monitor and report changes from baseline. Collaborate with interdisciplinary team to initiate plan and implement interventions as ordered.   - Provide and maintain a safe environment.  - Consider seizure precautions.  - Consider fall precautions.  - Consider aspiration precautions.  - Consider bleeding precautions.  Outcome: Progressing     Problem: Activity Intolerance/Impaired  Mobility  Goal: Mobility/activity is maintained at optimum level for patient  Description: Interventions:  - Assess and monitor patient  barriers to mobility and need for assistive/adaptive devices.  - Assess patient's emotional response to limitations.  - Collaborate with interdisciplinary team and initiate plans and interventions as ordered.  - Encourage independent activity per ability.  - Maintain proper body alignment.  - Perform active/passive rom as tolerated/ordered.  - Plan activities to conserve energy.  - Turn patient as appropriate  Outcome: Progressing     Problem: Communication Impairment  Goal: Ability to express needs and understand communication  Description: Assess patient's communication skills and ability to understand information.  Patient will demonstrate use of effective communication techniques, alternative methods of communication and understanding even if not able to speak.     - Encourage communication and provide alternate methods of communication as needed.  - Collaborate with case management/ for discharge needs.  - Include patient/family/caregiver in decisions related to communication.  Outcome: Progressing     Problem: Potential for Aspiration  Goal: Non-ventilated patient's risk of aspiration is minimized  Description: Assess and monitor vital signs, respiratory status, and labs (WBC).  Monitor for signs of aspiration (tachypnea, cough, rales, wheezing, cyanosis, fever).    - Assess and monitor patient's ability to swallow.  - Place patient up in chair to eat if possible.  - HOB up at 90 degrees to eat if unable to get patient up into chair.  - Supervise patient during oral intake.   - Instruct patient/ family to take small bites.  - Instruct patient/ family to take small single sips when taking liquids.  - Follow patient-specific strategies generated by speech pathologist.  Outcome: Progressing     Problem: Nutrition  Goal: Nutrition/Hydration status is  improving  Description: Monitor and assess patient's nutrition/hydration status for malnutrition (ex- brittle hair, bruises, dry skin, pale skin and conjunctiva, muscle wasting, smooth red tongue, and disorientation). Collaborate with interdisciplinary team and initiate plan and interventions as ordered.  Monitor patient's weight and dietary intake as ordered or per policy. Utilize nutrition screening tool and intervene per policy. Determine patient's food preferences and provide high-protein, high-caloric foods as appropriate.     - Assist patient with eating.  - Allow adequate time for meals.  - Encourage patient to take dietary supplement as ordered.  - Collaborate with clinical nutritionist.  - Include patient/family/caregiver in decisions related to nutrition.  Outcome: Progressing

## 2025-06-02 NOTE — ASSESSMENT & PLAN NOTE
Lab Results   Component Value Date    HGBA1C 13.3 (H) 05/26/2025     Recent Labs     06/01/25 2058 06/02/25  0706 06/02/25  1105 06/02/25  1534   POCGLU 228* 181* 155* 189*     Blood Sugar Average: Last 72 hrs:  (P) 202.5    Poorly controlled T2DM per most recent HgbA1c 13. Patient admits to noncompliance with insulin/medications, notes difficulty with affording and running out of supplies for glucometer though recently applied for Medicaid.  Prior home regimen: Lantus 30U BID, Humalog SSI 4-16U TID, Trulicity weekly injections.   Continue Lantus 35U BID upon discharge - pen & glucometer supplies sent to pharmacy with $0 copay per CM & needs outpatient F/U with endocrinology  SSI coverage with Accu-Cheks ACHS  Carb controlled diet, hypoglycemia protocol

## 2025-06-03 ENCOUNTER — TRANSITIONAL CARE MANAGEMENT (OUTPATIENT)
Dept: FAMILY MEDICINE CLINIC | Facility: CLINIC | Age: 40
End: 2025-06-03

## 2025-06-03 DIAGNOSIS — Z79.4 TYPE 2 DIABETES MELLITUS WITH DIABETIC POLYNEUROPATHY, WITH LONG-TERM CURRENT USE OF INSULIN (HCC): ICD-10-CM

## 2025-06-03 DIAGNOSIS — E11.42 TYPE 2 DIABETES MELLITUS WITH DIABETIC POLYNEUROPATHY, WITH LONG-TERM CURRENT USE OF INSULIN (HCC): ICD-10-CM

## 2025-06-03 NOTE — UTILIZATION REVIEW
NOTIFICATION OF ADMISSION DISCHARGE   This is a Notification of Discharge from [facility]. Please be advised that this patient has been discharge from our facility. Below you will find the admission and discharge date and time including the patient’s disposition.   UTILIZATION REVIEW CONTACT:  Utilization Review Assistants  Network Utilization Review Department  Phone: [phone] x carefully listen to the prompts. All voicemails are confidential.  Email: [email or web address]     ADMISSION INFORMATION  PRESENTATION DATE: 5/26/2025  2:40 AM  OBERVATION ADMISSION DATE: 05/26/2025 0814  INPATIENT ADMISSION DATE: 5/27/25  1:05 PM   DISCHARGE DATE: 6/2/2025  6:12 PM   DISPOSITION:Home/Self Care    Network Utilization Review Department  ATTENTION: Please call with any questions or concerns to [phone] and carefully listen to the prompts so that you are directed to the right person. All voicemails are confidential.   For Discharge needs, contact Care Management DC Support Team at [phone] opt. 2  Send all requests for admission clinical reviews, approved or denied determinations and any other requests to dedicated fax number below belonging to the campus where the patient is receiving treatment. List of dedicated fax numbers for the Facilities:  FACILITY NAME UR FAX NUMBER   ADMISSION DENIALS (Administrative/Medical Necessity) [phone]   DISCHARGE SUPPORT TEAM ([facility]) [phone]   PARENT CHILD HEALTH (Maternity/NICU/Pediatrics) [phone]   [facility] [phone]   [facility] [phone]   [facility] [phone]   [facility] [phone]   [facility] [phone]   [facility] [phone]   [facility] [phone]   [facility] [phone]   [facility]  [facility] [phone]   [facility] [phone]   [facility] [phone]   [facility] [phone]

## 2025-06-03 NOTE — UTILIZATION REVIEW
NOTIFICATION OF ADMISSION DISCHARGE   This is a Notification of Discharge from [facility]. Please be advised that this patient has been discharge from our facility. Below you will find the admission and discharge date and time including the patient’s disposition.   UTILIZATION REVIEW CONTACT:  Utilization Review Assistants  Network Utilization Review Department  Phone: [phone] x carefully listen to the prompts. All voicemails are confidential.  Email: [email or web address]     ADMISSION INFORMATION  PRESENTATION DATE: 5/26/2025  2:40 AM  OBERVATION ADMISSION DATE: 05/26/2025 0814  INPATIENT ADMISSION DATE: 5/27/25  1:05 PM   DISCHARGE DATE: 6/2/2025  6:12 PM   DISPOSITION:Home/Self Care    Network Utilization Review Department  ATTENTION: Please call with any questions or concerns to [phone] and carefully listen to the prompts so that you are directed to the right person. All voicemails are confidential.   For Discharge needs, contact Care Management DC Support Team at [phone] opt. 2  Send all requests for admission clinical reviews, approved or denied determinations and any other requests to dedicated fax number below belonging to the campus where the patient is receiving treatment. List of dedicated fax numbers for the Facilities:  FACILITY NAME UR FAX NUMBER   ADMISSION DENIALS (Administrative/Medical Necessity) [phone]   DISCHARGE SUPPORT TEAM ([facility]) [phone]   PARENT CHILD HEALTH (Maternity/NICU/Pediatrics) [phone]   [facility] [phone]   General acute hospital [phone]   [facility] [phone]   [facility] [phone]   [facility] [phone]   [facility] [phone]   [facility] [phone]   [facility] [phone]   [facility]  [facility] [phone]   [facility] [phone]   [facility] [phone]   [facility] [phone]

## 2025-06-04 RX ORDER — INSULIN GLARGINE 100 [IU]/ML
INJECTION, SOLUTION SUBCUTANEOUS
Qty: 10 ML | Refills: 6 | OUTPATIENT
Start: 2025-06-04

## 2025-06-09 ENCOUNTER — TELEPHONE (OUTPATIENT)
Age: 40
End: 2025-06-09

## 2025-06-09 NOTE — TELEPHONE ENCOUNTER
Patient called to asked if she's ever had a mental health evaluation. She is looking for pysch eval documentation that she thinks she might've gotten in the office. Please advise back to patient to further assist.

## 2025-06-10 DIAGNOSIS — Z79.4 TYPE 2 DIABETES MELLITUS WITH DIABETIC POLYNEUROPATHY, WITH LONG-TERM CURRENT USE OF INSULIN (HCC): ICD-10-CM

## 2025-06-10 DIAGNOSIS — E11.42 TYPE 2 DIABETES MELLITUS WITH DIABETIC POLYNEUROPATHY, WITH LONG-TERM CURRENT USE OF INSULIN (HCC): ICD-10-CM

## 2025-06-10 RX ORDER — INSULIN GLARGINE 100 [IU]/ML
INJECTION, SOLUTION SUBCUTANEOUS
Qty: 10 ML | Refills: 6 | OUTPATIENT
Start: 2025-06-10

## 2025-06-10 NOTE — TELEPHONE ENCOUNTER
The original prescription was discontinued on 6/2/2025 by Shanthi Lane PA-C. Renewing this prescription may not be appropriate.

## 2025-06-10 NOTE — QUICK NOTE
Received inbox message that prescriptions for Advair, atorvastatin and gabapentin were not available at her pharmacy. Called and discussed with patient, she stated that she was able to get these prescriptions and has no concerns with her medications at home.

## 2025-06-14 NOTE — BRIEF OP NOTE (RAD/CATH)
INTERVENTIONAL RADIOLOGY PROCEDURE NOTE    Date: 5/30/2025    Procedure:   RLE arteriography with intervention      Preoperative diagnosis:   1. Left hand pain    2. Stroke-like symptoms    3. Left arm numbness    4. History of CVA (cerebrovascular accident)    5. Peripheral arterial disease (HCC)    6. S/P AKA (above knee amputation) unilateral, left (HCC)    7. Wound of right foot    8. Compression neuropathy of left upper extremity         Postoperative diagnosis: Same.    Surgeon: Stevan Montez MD     Assistant: None. No qualified resident was available.    Blood loss: 5 ml    Specimens: none.     Findings:   RLE arteriogram showed multifocal critical stenoses involving the entirety of the SFA and popliteal (including eccentric thrombus in the proximal pop). 3 vessel tibial runoff with small vessel foot arteriopathy and an incomplete pedal plantar arch.    4 and 5 mm DCB of the entirety of the SFA and P1 segment.    Small amount of distal embolization into the peroneal and distal PT, treated with tPA infusion.    Starclose L CFA for closure.  100 ml contrast.    Complications: Small volume distal embolization.    Anesthesia: conscious sedation      Vascular Quality Initiative - Peripheral Vascular Intervention     Urgency: Urgent    Functional Status:  Ambulatory and capable of all self care but unable to carry out any work activities. Up and about more than 50% of waking hours.   Ambulation: Amb w/ prosthesis =  independently ambulatory using an above or below knee prosthesis    Leg Symptoms    Right: Ischemic Rest Pain  Left: Asymptomatic:  documented peripheral arterial disease without symptoms of claudication or ischemic pain      Treatment of Native Artery to Maintain Bypass Patency?:  No    COVID Information  COVID Symptoms Pre-Procedure: Asymptomatic    Treatment Delayed by Pandemic: None    Access   Number of Sites: 1     Access Site 1:     Side 1: Left    Site 1: Femoral Retrograde    Access Guidance  1:U/S    Largest Sheath Size 1: 6 Fr.    Closure Device 1: StarClose      Number of Closure Devices: 1     Closure Device Outcome: Closure device successful         Procedure  Fluoro Time: 21.1 minutes  Contrast Volume: Visipaque 100 ml  DAP: 102 cGy.cm2  CO2: no  Anticoagulant: Heparin  Protamine: No  If Creatinine is > 1.2 or missing, ASHLEY Prophylaxis none     Treatment Details  Indication: Occlusive Disease,    Completion Assessment  Artery 1 treated: SFA+Pop   Right               Outflow: AT,PT,Peroneal: 3                       Segments treated: P1                    Was this Site previously treated?: No          TASC Grade: B          Total Treated Length: 30 cm          Total Occluded Length: 0 cm          Calcification: Moderate (calcification on both sides of artery < half length of lesion)          Number of Treatment types (Devices):   1           Device 1          Treatment Type: Special Balloon,  Drug Coated Balloon                Diameter: 5 mm          Length: 30 mm          Concomitant: None          Technical result: Successful (stenosis <=30%)      None     Post Procedure  Patient currently taking: Statin, Yes      Antiplatelet Medication, Yes    Procedure Complications: Yes   Complications:   Embolization:  Interventional    Target Lesion Thrombosis by Artery   Artery 1: Interventional   no

## 2025-06-17 DIAGNOSIS — Z79.4 TYPE 2 DIABETES MELLITUS WITH DIABETIC POLYNEUROPATHY, WITH LONG-TERM CURRENT USE OF INSULIN (HCC): ICD-10-CM

## 2025-06-17 DIAGNOSIS — E11.42 TYPE 2 DIABETES MELLITUS WITH DIABETIC POLYNEUROPATHY, WITH LONG-TERM CURRENT USE OF INSULIN (HCC): ICD-10-CM

## 2025-06-17 RX ORDER — INSULIN GLARGINE 100 [IU]/ML
INJECTION, SOLUTION SUBCUTANEOUS
Qty: 10 ML | Refills: 2 | Status: SHIPPED | OUTPATIENT
Start: 2025-06-17

## 2025-06-24 ENCOUNTER — TELEPHONE (OUTPATIENT)
Age: 40
End: 2025-06-24

## 2025-06-24 DIAGNOSIS — J44.9 CHRONIC OBSTRUCTIVE PULMONARY DISEASE, UNSPECIFIED COPD TYPE (HCC): ICD-10-CM

## 2025-06-24 NOTE — LETTER
June 25, 2025     Patient: Tony Roberto  YOB: 1985  Date of Visit: 6/24/2025      To Whom it May Concern:    Tony Roberto is under my professional care. Tony was seen in my office on 6/24/2025. Tony {Return to school/sport/work:[id number]}.    If you have any questions or concerns, please don't hesitate to call.         Sincerely,          George Reyes        CC: No Recipients

## 2025-06-24 NOTE — TELEPHONE ENCOUNTER
Tony would like for her provider to write a letter. This letter is needed because she is being evicted this Sunday. She needs the letter to say that she has bipolar schizophrenia affective and any other mental health diagnosis that is in the chart. She also said that any other diagnosis could be added in the letter. It also has to say that she is on medication for it and she is looking for mental health facility. Please call Tony for any other questions.    She also has an appointment coming up 6/27/25, she needs a lyft ride to be ordered for her.

## 2025-06-25 RX ORDER — ALBUTEROL SULFATE 90 UG/1
INHALANT RESPIRATORY (INHALATION)
Qty: 8.5 G | Refills: 0 | Status: SHIPPED | OUTPATIENT
Start: 2025-06-25

## 2025-06-25 NOTE — TELEPHONE ENCOUNTER
Reached out to patient in regard to the provider she is seeing is out of office and appt needed to be canceled, she stated that she needs the letter asap, is there any way something can be done for letter and have the patient come in for an office visit when there is a spot to place the pt.     Please complete letter if possible

## 2025-06-25 NOTE — TELEPHONE ENCOUNTER
Spoke with patient via phone, letter written and printed, patient will call back with fax number, letter needs to be Attn: Sheri.  Please note letter was written at another encounter because that is the only way my information would populate in the letter template (a communication written in this encounter will only populate the pod info).

## 2025-06-25 NOTE — TELEPHONE ENCOUNTER
Patient called asking to speak to who she was just talking to in office.  Patient disconnected call while waiting for clerical.  Please call patient when available.

## 2025-06-30 ENCOUNTER — TELEPHONE (OUTPATIENT)
Age: 40
End: 2025-06-30

## 2025-06-30 NOTE — TELEPHONE ENCOUNTER
Tony will need a Lyft ride for cassidy and Stephanie on July 9th for their 1pm and 2pm appointment at the office.    Please reach out to Tony with ride information.

## 2025-07-01 ENCOUNTER — TELEPHONE (OUTPATIENT)
Dept: FAMILY MEDICINE CLINIC | Facility: CLINIC | Age: 40
End: 2025-07-01

## 2025-07-01 NOTE — TELEPHONE ENCOUNTER
Received pw to be filled out for [employer] for the pt. It will be placed into the attendings folder to be comp, when comp it will be faxed

## 2025-07-08 DIAGNOSIS — J44.9 CHRONIC OBSTRUCTIVE PULMONARY DISEASE, UNSPECIFIED COPD TYPE (HCC): ICD-10-CM

## 2025-07-09 ENCOUNTER — OFFICE VISIT (OUTPATIENT)
Dept: FAMILY MEDICINE CLINIC | Facility: CLINIC | Age: 40
End: 2025-07-09

## 2025-07-09 ENCOUNTER — TELEPHONE (OUTPATIENT)
Age: 40
End: 2025-07-09

## 2025-07-09 VITALS
WEIGHT: 226 LBS | SYSTOLIC BLOOD PRESSURE: 151 MMHG | BODY MASS INDEX: 42.7 KG/M2 | DIASTOLIC BLOOD PRESSURE: 88 MMHG | HEART RATE: 95 BPM | TEMPERATURE: 98 F | OXYGEN SATURATION: 98 %

## 2025-07-09 DIAGNOSIS — H57.13 DISCOMFORT OF BOTH EYES: ICD-10-CM

## 2025-07-09 DIAGNOSIS — F17.200 NICOTINE DEPENDENCE, UNCOMPLICATED, UNSPECIFIED NICOTINE PRODUCT TYPE: ICD-10-CM

## 2025-07-09 DIAGNOSIS — E11.42 TYPE 2 DIABETES MELLITUS WITH DIABETIC POLYNEUROPATHY, WITH LONG-TERM CURRENT USE OF INSULIN (HCC): Primary | ICD-10-CM

## 2025-07-09 DIAGNOSIS — J44.1 CHRONIC OBSTRUCTIVE PULMONARY DISEASE WITH ACUTE EXACERBATION (HCC): ICD-10-CM

## 2025-07-09 DIAGNOSIS — F31.75 BIPOLAR DISORDER, IN PARTIAL REMISSION, MOST RECENT EPISODE DEPRESSED (HCC): ICD-10-CM

## 2025-07-09 DIAGNOSIS — E11.42 TYPE 2 DIABETES MELLITUS WITH DIABETIC POLYNEUROPATHY, WITH LONG-TERM CURRENT USE OF INSULIN (HCC): ICD-10-CM

## 2025-07-09 DIAGNOSIS — Z59.819 HOUSING INSECURITY: ICD-10-CM

## 2025-07-09 DIAGNOSIS — I10 HYPERTENSION: ICD-10-CM

## 2025-07-09 DIAGNOSIS — L03.311 CELLULITIS OF ABDOMINAL WALL: ICD-10-CM

## 2025-07-09 DIAGNOSIS — I73.9 PAD (PERIPHERAL ARTERY DISEASE) (HCC): ICD-10-CM

## 2025-07-09 DIAGNOSIS — Z79.4 TYPE 2 DIABETES MELLITUS WITH DIABETIC POLYNEUROPATHY, WITH LONG-TERM CURRENT USE OF INSULIN (HCC): Primary | ICD-10-CM

## 2025-07-09 DIAGNOSIS — Z89.612 S/P AKA (ABOVE KNEE AMPUTATION) UNILATERAL, LEFT (HCC): ICD-10-CM

## 2025-07-09 DIAGNOSIS — Z79.4 TYPE 2 DIABETES MELLITUS WITH DIABETIC POLYNEUROPATHY, WITH LONG-TERM CURRENT USE OF INSULIN (HCC): ICD-10-CM

## 2025-07-09 DIAGNOSIS — M79.642 LEFT HAND PAIN: ICD-10-CM

## 2025-07-09 DIAGNOSIS — Z59.9 FINANCIAL DIFFICULTIES: ICD-10-CM

## 2025-07-09 RX ORDER — GLUCOSAMINE HCL/CHONDROITIN SU 500-400 MG
CAPSULE ORAL
Qty: 200 EACH | Refills: 0 | Status: SHIPPED | OUTPATIENT
Start: 2025-07-09 | End: 2025-07-09

## 2025-07-09 RX ORDER — PEN NEEDLE, DIABETIC 32GX 5/32"
NEEDLE, DISPOSABLE MISCELLANEOUS 4 TIMES DAILY
Qty: 100 EACH | Refills: 3 | Status: SHIPPED | OUTPATIENT
Start: 2025-07-09 | End: 2025-07-09

## 2025-07-09 RX ORDER — KETOTIFEN FUMARATE 0.35 MG/ML
1 SOLUTION/ DROPS OPHTHALMIC 2 TIMES DAILY PRN
Qty: 5 ML | Refills: 0 | Status: SHIPPED | OUTPATIENT
Start: 2025-07-09 | End: 2025-07-09

## 2025-07-09 RX ORDER — LANOLIN ALCOHOL/MO/W.PET/CERES
400 CREAM (GRAM) TOPICAL 2 TIMES DAILY
Qty: 60 TABLET | Refills: 0 | Status: ON HOLD | OUTPATIENT
Start: 2025-07-09 | End: 2025-08-08

## 2025-07-09 RX ORDER — ATORVASTATIN CALCIUM 80 MG/1
80 TABLET, FILM COATED ORAL
Qty: 30 TABLET | Refills: 0 | Status: SHIPPED | OUTPATIENT
Start: 2025-07-09 | End: 2025-07-09

## 2025-07-09 RX ORDER — CYCLOBENZAPRINE HCL 10 MG
10 TABLET ORAL 3 TIMES DAILY PRN
Qty: 30 TABLET | Refills: 0 | Status: SHIPPED | OUTPATIENT
Start: 2025-07-09 | End: 2025-07-09

## 2025-07-09 RX ORDER — SYRING-NEEDL,DISP,INSUL,0.3 ML 28GX1/2"
SYRINGE, EMPTY DISPOSABLE MISCELLANEOUS EVERY 12 HOURS
Qty: 300 EACH | Refills: 5 | Status: ON HOLD | OUTPATIENT
Start: 2025-07-09

## 2025-07-09 RX ORDER — BLOOD SUGAR DIAGNOSTIC
STRIP MISCELLANEOUS
Qty: 200 EACH | Refills: 0 | Status: ON HOLD | OUTPATIENT
Start: 2025-07-09

## 2025-07-09 RX ORDER — ASPIRIN 81 MG/1
81 TABLET, CHEWABLE ORAL DAILY
Qty: 30 TABLET | Refills: 0 | Status: ON HOLD | OUTPATIENT
Start: 2025-07-09 | End: 2025-08-08

## 2025-07-09 RX ORDER — INSULIN GLARGINE 100 [IU]/ML
30 INJECTION, SOLUTION SUBCUTANEOUS EVERY 12 HOURS SCHEDULED
Qty: 10 ML | Refills: 2 | Status: SHIPPED | OUTPATIENT
Start: 2025-07-09 | End: 2025-07-09

## 2025-07-09 RX ORDER — ALBUTEROL SULFATE 90 UG/1
2 INHALANT RESPIRATORY (INHALATION) EVERY 4 HOURS PRN
Qty: 8.5 G | Refills: 3 | Status: SHIPPED | OUTPATIENT
Start: 2025-07-09 | End: 2025-07-09

## 2025-07-09 RX ORDER — PRAZOSIN HYDROCHLORIDE 1 MG/1
1 CAPSULE ORAL
Qty: 30 CAPSULE | Refills: 0 | Status: ON HOLD | OUTPATIENT
Start: 2025-07-09 | End: 2025-08-08

## 2025-07-09 RX ORDER — ASPIRIN 81 MG/1
81 TABLET, CHEWABLE ORAL DAILY
Qty: 30 TABLET | Refills: 0 | Status: SHIPPED | OUTPATIENT
Start: 2025-07-09 | End: 2025-07-09

## 2025-07-09 RX ORDER — PEN NEEDLE, DIABETIC 32GX 5/32"
NEEDLE, DISPOSABLE MISCELLANEOUS
Qty: 100 EACH | Refills: 0 | Status: ON HOLD | OUTPATIENT
Start: 2025-07-09

## 2025-07-09 RX ORDER — ALBUTEROL SULFATE 90 UG/1
2 INHALANT RESPIRATORY (INHALATION) EVERY 4 HOURS PRN
Qty: 8.5 G | Refills: 3 | Status: ON HOLD | OUTPATIENT
Start: 2025-07-09

## 2025-07-09 RX ORDER — LANCETS
EACH MISCELLANEOUS 3 TIMES DAILY
Qty: 30 EACH | Refills: 3 | Status: SHIPPED | OUTPATIENT
Start: 2025-07-09 | End: 2025-07-09

## 2025-07-09 RX ORDER — DULAGLUTIDE 1.5 MG/.5ML
0.75 INJECTION, SOLUTION SUBCUTANEOUS
Qty: 2 ML | Refills: 5 | Status: SHIPPED | OUTPATIENT
Start: 2025-07-09 | End: 2025-07-09

## 2025-07-09 RX ORDER — FLUTICASONE PROPIONATE AND SALMETEROL XINAFOATE 115; 21 UG/1; UG/1
2 AEROSOL, METERED RESPIRATORY (INHALATION) 2 TIMES DAILY
Qty: 12 G | Refills: 3 | Status: ON HOLD | OUTPATIENT
Start: 2025-07-09

## 2025-07-09 RX ORDER — CYCLOBENZAPRINE HCL 10 MG
10 TABLET ORAL 3 TIMES DAILY PRN
Qty: 30 TABLET | Refills: 0 | Status: ON HOLD | OUTPATIENT
Start: 2025-07-09

## 2025-07-09 RX ORDER — NICOTINE 21 MG/24HR
1 PATCH, TRANSDERMAL 24 HOURS TRANSDERMAL DAILY
Qty: 28 PATCH | Refills: 3 | Status: SHIPPED | OUTPATIENT
Start: 2025-07-09 | End: 2025-07-09

## 2025-07-09 RX ORDER — ALBUTEROL SULFATE 90 UG/1
INHALANT RESPIRATORY (INHALATION)
Qty: 8.5 G | Refills: 0 | OUTPATIENT
Start: 2025-07-09

## 2025-07-09 RX ORDER — INSULIN DEGLUDEC 100 U/ML
35 INJECTION, SOLUTION SUBCUTANEOUS EVERY 12 HOURS SCHEDULED
Qty: 21 ML | Refills: 2 | Status: SHIPPED | OUTPATIENT
Start: 2025-07-09 | End: 2025-07-09

## 2025-07-09 RX ORDER — ACETAMINOPHEN 500 MG
1000 TABLET ORAL EVERY 8 HOURS PRN
Qty: 60 TABLET | Refills: 0 | Status: ON HOLD | OUTPATIENT
Start: 2025-07-09

## 2025-07-09 RX ORDER — PEN NEEDLE, DIABETIC 32GX 5/32"
NEEDLE, DISPOSABLE MISCELLANEOUS
Qty: 100 EACH | Refills: 0 | Status: SHIPPED | OUTPATIENT
Start: 2025-07-09 | End: 2025-07-09

## 2025-07-09 RX ORDER — SYRING-NEEDL,DISP,INSUL,0.3 ML 28GX1/2"
SYRINGE, EMPTY DISPOSABLE MISCELLANEOUS EVERY 12 HOURS
Qty: 300 EACH | Refills: 5 | Status: SHIPPED | OUTPATIENT
Start: 2025-07-09 | End: 2025-07-09

## 2025-07-09 RX ORDER — BLOOD SUGAR DIAGNOSTIC
STRIP MISCELLANEOUS
Qty: 200 EACH | Refills: 0 | Status: SHIPPED | OUTPATIENT
Start: 2025-07-09 | End: 2025-07-09

## 2025-07-09 RX ORDER — ALCOHOL ANTISEPTIC PADS
PADS, MEDICATED (EA) TOPICAL 3 TIMES DAILY
Qty: 300 EACH | Refills: 3 | Status: ON HOLD | OUTPATIENT
Start: 2025-07-09 | End: 2025-08-08

## 2025-07-09 RX ORDER — ATORVASTATIN CALCIUM 80 MG/1
80 TABLET, FILM COATED ORAL
Qty: 30 TABLET | Refills: 0 | Status: ON HOLD | OUTPATIENT
Start: 2025-07-09 | End: 2025-08-08

## 2025-07-09 RX ORDER — LISINOPRIL 20 MG/1
20 TABLET ORAL DAILY
Qty: 30 TABLET | Refills: 0 | Status: SHIPPED | OUTPATIENT
Start: 2025-07-09 | End: 2025-07-09

## 2025-07-09 RX ORDER — PEN NEEDLE, DIABETIC 32GX 5/32"
NEEDLE, DISPOSABLE MISCELLANEOUS 4 TIMES DAILY
Qty: 100 EACH | Refills: 3 | Status: ON HOLD | OUTPATIENT
Start: 2025-07-09 | End: 2025-08-08

## 2025-07-09 RX ORDER — INSULIN DEGLUDEC 100 U/ML
35 INJECTION, SOLUTION SUBCUTANEOUS EVERY 12 HOURS SCHEDULED
Qty: 21 ML | Refills: 2 | Status: ON HOLD | OUTPATIENT
Start: 2025-07-09 | End: 2025-10-07

## 2025-07-09 RX ORDER — LANCETS
EACH MISCELLANEOUS 3 TIMES DAILY
Qty: 30 EACH | Refills: 3 | Status: ON HOLD | OUTPATIENT
Start: 2025-07-09 | End: 2025-08-08

## 2025-07-09 RX ORDER — KETOTIFEN FUMARATE 0.35 MG/ML
1 SOLUTION/ DROPS OPHTHALMIC 2 TIMES DAILY PRN
Qty: 5 ML | Refills: 0 | Status: ON HOLD | OUTPATIENT
Start: 2025-07-09 | End: 2025-08-28

## 2025-07-09 RX ORDER — GLUCOSAMINE HCL/CHONDROITIN SU 500-400 MG
CAPSULE ORAL
Qty: 200 EACH | Refills: 0 | Status: ON HOLD | OUTPATIENT
Start: 2025-07-09

## 2025-07-09 RX ORDER — DULAGLUTIDE 1.5 MG/.5ML
0.75 INJECTION, SOLUTION SUBCUTANEOUS
Qty: 2 ML | Refills: 5 | Status: ON HOLD | OUTPATIENT
Start: 2025-07-09 | End: 2025-08-08

## 2025-07-09 RX ORDER — LANCETS 30 GAUGE
EACH MISCELLANEOUS 3 TIMES DAILY
Qty: 100 EACH | Refills: 0 | Status: ON HOLD | OUTPATIENT
Start: 2025-07-09

## 2025-07-09 RX ORDER — FLUTICASONE PROPIONATE AND SALMETEROL XINAFOATE 115; 21 UG/1; UG/1
2 AEROSOL, METERED RESPIRATORY (INHALATION) 2 TIMES DAILY
Qty: 12 G | Refills: 3 | Status: SHIPPED | OUTPATIENT
Start: 2025-07-09 | End: 2025-07-09

## 2025-07-09 RX ORDER — INSULIN GLARGINE 100 [IU]/ML
30 INJECTION, SOLUTION SUBCUTANEOUS EVERY 12 HOURS SCHEDULED
Qty: 10 ML | Refills: 2 | Status: ON HOLD | OUTPATIENT
Start: 2025-07-09 | End: 2025-08-28

## 2025-07-09 RX ORDER — ALCOHOL ANTISEPTIC PADS
PADS, MEDICATED (EA) TOPICAL 3 TIMES DAILY
Qty: 300 EACH | Refills: 3 | Status: SHIPPED | OUTPATIENT
Start: 2025-07-09 | End: 2025-07-09

## 2025-07-09 RX ORDER — LISINOPRIL 20 MG/1
20 TABLET ORAL DAILY
Qty: 30 TABLET | Refills: 0 | Status: ON HOLD | OUTPATIENT
Start: 2025-07-09 | End: 2025-08-08

## 2025-07-09 RX ORDER — ISOPROPYL ALCOHOL 0.75 G/1
SWAB TOPICAL DAILY
Qty: 400 EACH | Refills: 3 | Status: SHIPPED | OUTPATIENT
Start: 2025-07-09 | End: 2025-07-09

## 2025-07-09 RX ORDER — ISOPROPYL ALCOHOL 0.75 G/1
SWAB TOPICAL DAILY
Qty: 400 EACH | Refills: 3 | Status: ON HOLD | OUTPATIENT
Start: 2025-07-09

## 2025-07-09 RX ORDER — DULOXETIN HYDROCHLORIDE 30 MG/1
60 CAPSULE, DELAYED RELEASE ORAL DAILY
Qty: 60 CAPSULE | Refills: 0 | Status: ON HOLD | OUTPATIENT
Start: 2025-07-09 | End: 2025-08-08

## 2025-07-09 RX ORDER — NICOTINE 21 MG/24HR
1 PATCH, TRANSDERMAL 24 HOURS TRANSDERMAL DAILY
Qty: 28 PATCH | Refills: 3 | Status: ON HOLD | OUTPATIENT
Start: 2025-07-09

## 2025-07-09 RX ORDER — LANOLIN ALCOHOL/MO/W.PET/CERES
400 CREAM (GRAM) TOPICAL 2 TIMES DAILY
Qty: 60 TABLET | Refills: 0 | Status: SHIPPED | OUTPATIENT
Start: 2025-07-09 | End: 2025-07-09

## 2025-07-09 RX ORDER — LANCETS 30 GAUGE
EACH MISCELLANEOUS 3 TIMES DAILY
Qty: 100 EACH | Refills: 0 | Status: SHIPPED | OUTPATIENT
Start: 2025-07-09 | End: 2025-07-09

## 2025-07-09 RX ORDER — DULOXETIN HYDROCHLORIDE 30 MG/1
60 CAPSULE, DELAYED RELEASE ORAL DAILY
Qty: 60 CAPSULE | Refills: 0 | Status: SHIPPED | OUTPATIENT
Start: 2025-07-09 | End: 2025-07-09

## 2025-07-09 RX ORDER — ACETAMINOPHEN 500 MG
1000 TABLET ORAL EVERY 8 HOURS PRN
Qty: 60 TABLET | Refills: 0 | Status: SHIPPED | OUTPATIENT
Start: 2025-07-09 | End: 2025-07-09

## 2025-07-09 RX ORDER — PRAZOSIN HYDROCHLORIDE 1 MG/1
1 CAPSULE ORAL
Qty: 30 CAPSULE | Refills: 0 | Status: SHIPPED | OUTPATIENT
Start: 2025-07-09 | End: 2025-07-09

## 2025-07-09 SDOH — ECONOMIC STABILITY - INCOME SECURITY: PROBLEM RELATED TO HOUSING AND ECONOMIC CIRCUMSTANCES, UNSPECIFIED: Z59.9

## 2025-07-09 SDOH — ECONOMIC STABILITY - HOUSING INSECURITY: HOUSING INSTABILITY UNSPECIFIED: Z59.819

## 2025-07-09 NOTE — ASSESSMENT & PLAN NOTE
- Stable. Refills ordered.    Orders:    nicotine (NICODERM CQ) 21 mg/24 hr TD 24 hr patch; Place 1 patch on the skin daily over 24 hours

## 2025-07-09 NOTE — ASSESSMENT & PLAN NOTE
"  Lab Results   Component Value Date    HGBA1C 13.3 (H) 05/26/2025     - Diabetic management protocol includes Tresiba 35 units q12h (Degludec) and Lantus (glargine) 30 units q12h.  - Pt on Trulicity 0.75mg once weekly.  - Continue checking blood sugar 3 times daily with meals.        Orders:    Trulicity 1.5 MG/0.5ML SOAJ; Inject 0.75 mg under the skin every 7 days    metFORMIN (GLUCOPHAGE) 1000 MG tablet; Take 1 tablet (1,000 mg total) by mouth 2 (two) times a day with meals    metFORMIN (GLUCOPHAGE) 1000 MG tablet; Take 1 tablet (1,000 mg total) by mouth 2 (two) times a day with meals    Lantus 100 UNIT/ML subcutaneous injection; Inject 30 Units under the skin every 12 (twelve) hours    Insulin Pen Needle (Comfort EZ Pen Needles) 33G X 4 MM MISC; Inject into a muscle in the morning and at noon and in the evening. Use as directed to administer insulin.    Insulin Pen Needle (BD Pen Needle Leann 2nd Gen) 32G X 4 MM MISC; For use with insulin pen. Pharmacy may dispense brand covered by insurance.    Insulin Pen Needle (BD Pen Needle Leann 2nd Gen) 32G X 4 MM MISC; Inject into a muscle in the morning and at noon and in the evening and before bedtime.    insulin degludec (Tresiba FlexTouch) 100 units/mL injection pen; Inject 35 Units under the skin every 12 (twelve) hours    INS SYRINGE/NEEDLE .5CC/28G (B-D INS SYR MICROFINE .5CC/28G) 28G X 1/2\" 0.5 ML MISC; Use every 12 (twelve) hours Use as directed to inject tresiba Q12H    glucose blood (OneTouch Verio) test strip; May substitute brand based on insurance coverage. Check glucose ACHS.    Global Inject Ease Lancets 30G MISC; Use 3 (three) times a day    Alcohol Swabs 70 % PADS; May substitute brand based on insurance coverage. Check glucose ACHS.    Alcohol Swabs (B-D SINGLE USE SWABS REGULAR) PADS; Apply topically in the morning    Accu-Chek FastClix Lancets MISC; Use in the morning and at noon and in the evening. May substitute brand based on insurance coverage. " Check glucose ACHS.

## 2025-07-09 NOTE — ASSESSMENT & PLAN NOTE
- Pt with numbness and tingling in LUE 4th and 5th digits, experienced relief with flexeril. No relief with ibuprofen or tylenol. Refills ordered. Will f/u in 1 month.  - Counseled on blood sugar management contributing to neuropathy.   Orders:    magnesium Oxide (MAG-OX) 400 mg TABS; Take 1 tablet (400 mg total) by mouth 2 (two) times a day    cyclobenzaprine (FLEXERIL) 10 mg tablet; Take 1 tablet (10 mg total) by mouth 3 (three) times a day as needed for muscle spasms    acetaminophen (TYLENOL) 500 mg tablet; Take 2 tablets (1,000 mg total) by mouth every 8 (eight) hours as needed for mild pain

## 2025-07-09 NOTE — ASSESSMENT & PLAN NOTE
"- Stable. Refills ordered.  Lab Results   Component Value Date    HGBA1C 13.3 (H) 05/26/2025       Orders:    Trulicity 1.5 MG/0.5ML SOAJ; Inject 0.75 mg under the skin every 7 days    metFORMIN (GLUCOPHAGE) 1000 MG tablet; Take 1 tablet (1,000 mg total) by mouth 2 (two) times a day with meals    metFORMIN (GLUCOPHAGE) 1000 MG tablet; Take 1 tablet (1,000 mg total) by mouth 2 (two) times a day with meals    Lantus 100 UNIT/ML subcutaneous injection; Inject 30 Units under the skin every 12 (twelve) hours    Insulin Pen Needle (Comfort EZ Pen Needles) 33G X 4 MM MISC; Inject into a muscle in the morning and at noon and in the evening. Use as directed to administer insulin.    Insulin Pen Needle (BD Pen Needle Leann 2nd Gen) 32G X 4 MM MISC; For use with insulin pen. Pharmacy may dispense brand covered by insurance.    Insulin Pen Needle (BD Pen Needle Leann 2nd Gen) 32G X 4 MM MISC; Inject into a muscle in the morning and at noon and in the evening and before bedtime.    insulin degludec (Tresiba FlexTouch) 100 units/mL injection pen; Inject 35 Units under the skin every 12 (twelve) hours    INS SYRINGE/NEEDLE .5CC/28G (B-D INS SYR MICROFINE .5CC/28G) 28G X 1/2\" 0.5 ML MISC; Use every 12 (twelve) hours Use as directed to inject tresiba Q12H    glucose blood (OneTouch Verio) test strip; May substitute brand based on insurance coverage. Check glucose ACHS.    Global Inject Ease Lancets 30G MISC; Use 3 (three) times a day    Alcohol Swabs 70 % PADS; May substitute brand based on insurance coverage. Check glucose ACHS.    Alcohol Swabs (B-D SINGLE USE SWABS REGULAR) PADS; Apply topically in the morning    Accu-Chek FastClix Lancets MISC; Use in the morning and at noon and in the evening. May substitute brand based on insurance coverage. Check glucose ACHS.    "

## 2025-07-09 NOTE — PROGRESS NOTES
"Name: Tony Roberto      : 1985      MRN: [id number]  Encounter Provider: Lisa Majano DO  Encounter Date: 2025   Encounter department: [facility]  :  Assessment & Plan  Type 2 diabetes mellitus with diabetic polyneuropathy, with long-term current use of insulin (HCC)    Lab Results   Component Value Date    HGBA1C 13.3 (H) 2025     - Diabetic management protocol includes Tresiba 35 units q12h (Degludec) and Lantus (glargine) 30 units q12h.  - Pt on Trulicity 0.75mg once weekly.  - Continue checking blood sugar 3 times daily with meals.        Orders:    Trulicity 1.5 MG/0.5ML SOAJ; Inject 0.75 mg under the skin every 7 days    metFORMIN (GLUCOPHAGE) 1000 MG tablet; Take 1 tablet (1,000 mg total) by mouth 2 (two) times a day with meals    metFORMIN (GLUCOPHAGE) 1000 MG tablet; Take 1 tablet (1,000 mg total) by mouth 2 (two) times a day with meals    Lantus 100 UNIT/ML subcutaneous injection; Inject 30 Units under the skin every 12 (twelve) hours    Insulin Pen Needle (Comfort EZ Pen Needles) 33G X 4 MM MISC; Inject into a muscle in the morning and at noon and in the evening. Use as directed to administer insulin.    Insulin Pen Needle (BD Pen Needle Leann 2nd Gen) 32G X 4 MM MISC; For use with insulin pen. Pharmacy may dispense brand covered by insurance.    Insulin Pen Needle (BD Pen Needle Leann 2nd Gen) 32G X 4 MM MISC; Inject into a muscle in the morning and at noon and in the evening and before bedtime.    insulin degludec (Tresiba FlexTouch) 100 units/mL injection pen; Inject 35 Units under the skin every 12 (twelve) hours    INS SYRINGE/NEEDLE .5CC/28G (B-D INS SYR MICROFINE .5CC/28G) 28G X 1/2\" 0.5 ML MISC; Use every 12 (twelve) hours Use as directed to inject tresiba Q12H    glucose blood (OneTouch Verio) test strip; May substitute brand based on insurance coverage. Check glucose ACHS.    Global Inject Ease Lancets 30G MISC; Use 3 (three) times a day    Alcohol " Swabs 70 % PADS; May substitute brand based on insurance coverage. Check glucose ACHS.    Alcohol Swabs (B-D SINGLE USE SWABS REGULAR) PADS; Apply topically in the morning    Accu-Chek FastClix Lancets MISC; Use in the morning and at noon and in the evening. May substitute brand based on insurance coverage. Check glucose ACHS.    Housing insecurity  - Referred to Case Management STAT. Will f/u in 1 month.  Orders:    Ambulatory Referral to Complex Care Management Program; Future    Financial difficulties  - Referred to Case Management STAT, will f/u in 1 month.  Orders:    Ambulatory Referral to Complex Care Management Program; Future    S/P AKA (above knee amputation) unilateral, left (HCC)  - Stable       Chronic obstructive pulmonary disease with acute exacerbation (HCC)  - Stable. Refills ordered.  Orders:    albuterol (PROVENTIL HFA,VENTOLIN HFA) 90 mcg/act inhaler; Inhale 2 puffs every 4 (four) hours as needed for wheezing or shortness of breath    Advair -21 MCG/ACT inhaler; Inhale 2 puffs 2 (two) times a day Rinse mouth after use    PAD (peripheral artery disease)  - Stable. Refills ordered.  - Pt referred from hospital visit upon discharge to follow up with podiatry, vascular surgery for outpatient management. Will f/u in 1 month.  Orders:    rivaroxaban (Xarelto) 20 mg tablet; Take 1 tablet (20 mg total) by mouth daily with breakfast    atorvastatin (LIPITOR) 80 mg tablet; Take 1 tablet (80 mg total) by mouth daily with dinner    aspirin 81 mg chewable tablet; Chew 1 tablet (81 mg total) daily    Insulin-dependent diabetes mellitus with neuropathy  - Stable. Refills ordered.  Lab Results   Component Value Date    HGBA1C 13.3 (H) 05/26/2025       Orders:    Trulicity 1.5 MG/0.5ML SOAJ; Inject 0.75 mg under the skin every 7 days    metFORMIN (GLUCOPHAGE) 1000 MG tablet; Take 1 tablet (1,000 mg total) by mouth 2 (two) times a day with meals    metFORMIN (GLUCOPHAGE) 1000 MG tablet; Take 1 tablet (1,000  "mg total) by mouth 2 (two) times a day with meals    Lantus 100 UNIT/ML subcutaneous injection; Inject 30 Units under the skin every 12 (twelve) hours    Insulin Pen Needle (Comfort EZ Pen Needles) 33G X 4 MM MISC; Inject into a muscle in the morning and at noon and in the evening. Use as directed to administer insulin.    Insulin Pen Needle (BD Pen Needle Leann 2nd Gen) 32G X 4 MM MISC; For use with insulin pen. Pharmacy may dispense brand covered by insurance.    Insulin Pen Needle (BD Pen Needle Leann 2nd Gen) 32G X 4 MM MISC; Inject into a muscle in the morning and at noon and in the evening and before bedtime.    insulin degludec (Tresiba FlexTouch) 100 units/mL injection pen; Inject 35 Units under the skin every 12 (twelve) hours    INS SYRINGE/NEEDLE .5CC/28G (B-D INS SYR MICROFINE .5CC/28G) 28G X 1/2\" 0.5 ML MISC; Use every 12 (twelve) hours Use as directed to inject tresiba Q12H    glucose blood (OneTouch Verio) test strip; May substitute brand based on insurance coverage. Check glucose ACHS.    Global Inject Ease Lancets 30G MISC; Use 3 (three) times a day    Alcohol Swabs 70 % PADS; May substitute brand based on insurance coverage. Check glucose ACHS.    Alcohol Swabs (B-D SINGLE USE SWABS REGULAR) PADS; Apply topically in the morning    Accu-Chek FastClix Lancets MISC; Use in the morning and at noon and in the evening. May substitute brand based on insurance coverage. Check glucose ACHS.    Left hand pain  - Pt with numbness and tingling in LUE 4th and 5th digits, experienced relief with flexeril. No relief with ibuprofen or tylenol. Refills ordered. Will f/u in 1 month.  - Counseled on blood sugar management contributing to neuropathy.   Orders:    magnesium Oxide (MAG-OX) 400 mg TABS; Take 1 tablet (400 mg total) by mouth 2 (two) times a day    cyclobenzaprine (FLEXERIL) 10 mg tablet; Take 1 tablet (10 mg total) by mouth 3 (three) times a day as needed for muscle spasms    acetaminophen (TYLENOL) 500 mg " tablet; Take 2 tablets (1,000 mg total) by mouth every 8 (eight) hours as needed for mild pain    Bipolar disorder, in partial remission, most recent episode depressed (HCC)  - Stable. Refills ordered.  Orders:    DULoxetine (CYMBALTA) 30 mg delayed release capsule; Take 2 capsules (60 mg total) by mouth daily    Discomfort of both eyes  - Stable. Refills ordered.  Orders:    Ketotifen Fumarate (ZADITOR) 0.035 % ophthalmic solution; Administer 1 drop to both eyes 2 (two) times a day as needed (for eye discomfort)    glycerin-hypromellose- (ARTIFICIAL TEARS) 0.2-0.2-1 % SOLN; Administer 2 drops to both eyes every 6 (six) hours as needed (for eye discomfort)    Hypertension  - Stable. Refills ordered.  Orders:    prazosin (MINIPRESS) 1 mg capsule; Take 1 capsule (1 mg total) by mouth daily at bedtime    lisinopril (ZESTRIL) 20 mg tablet; Take 1 tablet (20 mg total) by mouth daily    Nicotine dependence, uncomplicated, unspecified nicotine product type  - Stable. Refills ordered.    Orders:    nicotine (NICODERM CQ) 21 mg/24 hr TD 24 hr patch; Place 1 patch on the skin daily over 24 hours           History of Present Illness   Tony Roberto is a 40 y/o female presenting for follow up for numbness of L hand.     Numbness:  - L hand and arm, worst in 4th and 5th digit. Constant, tingling dull numb pain.   - began 3 months ago  - went to hospital for same complaint, unremarkable workup. Was asked to follow up with neurology, vascular surgery, endocrinology, and podiatry outpatient. Pt unable to do so.   - pt was prescribed cyclobenzaprine and oxycodone course, now finished.  - Pt has hx of diabetes, has been taking sugars daily, averaging in 500s. Pt is out of insulin that was given in hospital. When pt was taking insulin, she was averaging in the 100s.   - Recently started on degludec, was doing well. Pt ran out and is requesting a refill.   - Pt also requesting refill for trulicity.   - Pt dealing with  eviction today.   - At this time patient is requesting refills for all of her medications. She is also requesting something for pain for her L hand numbness. States that ibuprofen and tylenol do not help. Flexeril did help.       Review of Systems   Constitutional:  Negative for chills and fever.   HENT:  Negative for congestion and sore throat.    Eyes:  Negative for pain and redness.   Respiratory:  Negative for cough and shortness of breath.    Cardiovascular:  Negative for chest pain and palpitations.   Gastrointestinal:  Negative for abdominal pain, nausea and vomiting.   Genitourinary:  Negative for dysuria and hematuria.   Musculoskeletal:  Negative for back pain and neck pain.   Skin:  Positive for wound (R ankle\). Negative for rash.   Neurological:  Positive for numbness (L arm). Negative for tremors.       Objective   /88 (BP Location: Left arm, Patient Position: Sitting, Cuff Size: Large)   Pulse 95   Temp 98 °F (36.7 °C) (Temporal)   Wt 103 kg (226 lb)   SpO2 98%   BMI 42.70 kg/m²      Physical Exam  Vitals and nursing note reviewed.   Constitutional:       General: She is not in acute distress.     Appearance: She is well-developed.   HENT:      Head: Normocephalic and atraumatic.      Nose: No congestion or rhinorrhea.     Eyes:      General: No scleral icterus.        Right eye: No discharge.         Left eye: No discharge.      Extraocular Movements: Extraocular movements intact.      Conjunctiva/sclera: Conjunctivae normal.      Pupils: Pupils are equal, round, and reactive to light.       Cardiovascular:      Rate and Rhythm: Normal rate and regular rhythm.      Heart sounds: No murmur heard.  Pulmonary:      Effort: Pulmonary effort is normal. No respiratory distress.      Breath sounds: Normal breath sounds.   Abdominal:      Palpations: Abdomen is soft.      Tenderness: There is no abdominal tenderness.     Musculoskeletal:         General: No swelling.      Cervical back: Neck  supple. No rigidity or tenderness.      Comments: Wound on R ankle, healing well, non erythematous, non edematous, no pus or discharge.      Skin:     General: Skin is warm and dry.      Findings: No rash.     Neurological:      General: No focal deficit present.      Mental Status: She is alert and oriented to person, place, and time. Mental status is at baseline.      Sensory: Sensory deficit (LUE 4th and 5th digits, diminished sensation, responsive to squeeze but not light touch) present.     Psychiatric:         Mood and Affect: Mood normal.

## 2025-07-09 NOTE — ASSESSMENT & PLAN NOTE
- Stable. Refills ordered.  Orders:    albuterol (PROVENTIL HFA,VENTOLIN HFA) 90 mcg/act inhaler; Inhale 2 puffs every 4 (four) hours as needed for wheezing or shortness of breath

## 2025-07-09 NOTE — TELEPHONE ENCOUNTER
[facility] called because they received all the new medication scripts entered today but they should have been sent to [facility]. Please advise.

## 2025-07-09 NOTE — ASSESSMENT & PLAN NOTE
- Stable. Refills ordered.  Orders:    albuterol (PROVENTIL HFA,VENTOLIN HFA) 90 mcg/act inhaler; Inhale 2 puffs every 4 (four) hours as needed for wheezing or shortness of breath    Advair -21 MCG/ACT inhaler; Inhale 2 puffs 2 (two) times a day Rinse mouth after use

## 2025-07-09 NOTE — ASSESSMENT & PLAN NOTE
- Stable. Refills ordered.  Orders:    DULoxetine (CYMBALTA) 30 mg delayed release capsule; Take 2 capsules (60 mg total) by mouth daily

## 2025-07-09 NOTE — PATIENT INSTRUCTIONS
The [facility] provides safe housing, nutritionally balanced meals, clothing, hot showers, and access to medical care. Additionally, we connect the men in our care to a wide variety of local social service and support organizations for men 18 years old and older. How to access our Emergency Shelter:  Come to the Emergency Shelter at [address] [place] [address] starting at 4:00 pm with a photo ID. The Emergency Shelter is closed from 8:00 am to 4:00 pm. During inclement weather, the Emergency Shelter is open all day.  Call us at [phone] at any time, and we will give you instructions on how to access our facility and to learn more about the services we provide.  We offer our services to all men over 18 regardless of race, color, creed, political affiliation, Religion affiliation, or sexual orientation.  If you’re under the age of 18, a woman, or have dependent children, we recommend you call 211 from any phone to find shelter and other services. 211 is the [facility]’s direct phone line for “coordinated care” in the [facility].  They have expertise and access to a wide range of housing options.  If you are a man over the age of 18, without dependent children living with you, they will very likely direct you back to us.  But if you are in any other demographic or special needs group, they have numerous options available for you. Call 211. For more information reach out to our  [phone]

## 2025-07-12 ENCOUNTER — HOSPITAL ENCOUNTER (INPATIENT)
Facility: HOSPITAL | Age: 40
LOS: 2 days | DRG: 300 | End: 2025-07-15
Attending: EMERGENCY MEDICINE | Admitting: INTERNAL MEDICINE
Payer: COMMERCIAL

## 2025-07-12 DIAGNOSIS — F31.75 BIPOLAR DISORDER, IN PARTIAL REMISSION, MOST RECENT EPISODE DEPRESSED (HCC): ICD-10-CM

## 2025-07-12 DIAGNOSIS — E11.40 DIABETIC NEUROPATHY (HCC): Primary | ICD-10-CM

## 2025-07-12 DIAGNOSIS — Z79.4 DIABETES MELLITUS TYPE 2, INSULIN DEPENDENT (HCC): ICD-10-CM

## 2025-07-12 DIAGNOSIS — M79.642 LEFT HAND PAIN: ICD-10-CM

## 2025-07-12 DIAGNOSIS — M79.671 RIGHT FOOT PAIN: ICD-10-CM

## 2025-07-12 DIAGNOSIS — E11.9 DIABETES MELLITUS TYPE 2, INSULIN DEPENDENT (HCC): ICD-10-CM

## 2025-07-12 DIAGNOSIS — I73.9 PAD (PERIPHERAL ARTERY DISEASE) (HCC): ICD-10-CM

## 2025-07-12 DIAGNOSIS — Z87.898 HISTORY OF SUBSTANCE USE: ICD-10-CM

## 2025-07-12 DIAGNOSIS — Z78.9 DIFFICULTY REFILLING PRESCRIPTIONS: ICD-10-CM

## 2025-07-12 DIAGNOSIS — Z13.9 ENCOUNTER FOR SCREENING INVOLVING SOCIAL DETERMINANTS OF HEALTH (SDOH): ICD-10-CM

## 2025-07-12 LAB
ALBUMIN SERPL BCG-MCNC: 3.5 G/DL (ref 3.5–5)
ALP SERPL-CCNC: 91 U/L (ref 34–104)
ALT SERPL W P-5'-P-CCNC: 14 U/L (ref 7–52)
ANION GAP SERPL CALCULATED.3IONS-SCNC: 7 MMOL/L (ref 4–13)
AST SERPL W P-5'-P-CCNC: 10 U/L (ref 13–39)
BASOPHILS # BLD AUTO: 0.03 THOUSANDS/ÂΜL (ref 0–0.1)
BASOPHILS NFR BLD AUTO: 0 % (ref 0–1)
BILIRUB SERPL-MCNC: 0.35 MG/DL (ref 0.2–1)
BUN SERPL-MCNC: 22 MG/DL (ref 5–25)
CALCIUM SERPL-MCNC: 8.8 MG/DL (ref 8.4–10.2)
CHLORIDE SERPL-SCNC: 104 MMOL/L (ref 96–108)
CO2 SERPL-SCNC: 24 MMOL/L (ref 21–32)
CREAT SERPL-MCNC: 0.5 MG/DL (ref 0.6–1.3)
EOSINOPHIL # BLD AUTO: 0.37 THOUSAND/ÂΜL (ref 0–0.61)
EOSINOPHIL NFR BLD AUTO: 4 % (ref 0–6)
ERYTHROCYTE [DISTWIDTH] IN BLOOD BY AUTOMATED COUNT: 14.8 % (ref 11.6–15.1)
GFR SERPL CREATININE-BSD FRML MDRD: 123 ML/MIN/1.73SQ M
GLUCOSE SERPL-MCNC: 220 MG/DL (ref 65–140)
GLUCOSE SERPL-MCNC: 238 MG/DL (ref 65–140)
HCT VFR BLD AUTO: 35.6 % (ref 34.8–46.1)
HGB BLD-MCNC: 12.6 G/DL (ref 11.5–15.4)
IMM GRANULOCYTES # BLD AUTO: 0.03 THOUSAND/UL (ref 0–0.2)
IMM GRANULOCYTES NFR BLD AUTO: 0 % (ref 0–2)
LYMPHOCYTES # BLD AUTO: 3.32 THOUSANDS/ÂΜL (ref 0.6–4.47)
LYMPHOCYTES NFR BLD AUTO: 34 % (ref 14–44)
MCH RBC QN AUTO: 25.9 PG (ref 26.8–34.3)
MCHC RBC AUTO-ENTMCNC: 35.4 G/DL (ref 31.4–37.4)
MCV RBC AUTO: 73 FL (ref 82–98)
MONOCYTES # BLD AUTO: 0.55 THOUSAND/ÂΜL (ref 0.17–1.22)
MONOCYTES NFR BLD AUTO: 6 % (ref 4–12)
NEUTROPHILS # BLD AUTO: 5.35 THOUSANDS/ÂΜL (ref 1.85–7.62)
NEUTS SEG NFR BLD AUTO: 56 % (ref 43–75)
NRBC BLD AUTO-RTO: 0 /100 WBCS
PLATELET # BLD AUTO: 358 THOUSANDS/UL (ref 149–390)
PMV BLD AUTO: 10.2 FL (ref 8.9–12.7)
POTASSIUM SERPL-SCNC: 3.5 MMOL/L (ref 3.5–5.3)
PROT SERPL-MCNC: 6.8 G/DL (ref 6.4–8.4)
RBC # BLD AUTO: 4.86 MILLION/UL (ref 3.81–5.12)
SODIUM SERPL-SCNC: 135 MMOL/L (ref 135–147)
WBC # BLD AUTO: 9.65 THOUSAND/UL (ref 4.31–10.16)

## 2025-07-12 PROCEDURE — 99283 EMERGENCY DEPT VISIT LOW MDM: CPT

## 2025-07-12 PROCEDURE — 80053 COMPREHEN METABOLIC PANEL: CPT | Performed by: EMERGENCY MEDICINE

## 2025-07-12 PROCEDURE — 83036 HEMOGLOBIN GLYCOSYLATED A1C: CPT | Performed by: EMERGENCY MEDICINE

## 2025-07-12 PROCEDURE — 36415 COLL VENOUS BLD VENIPUNCTURE: CPT | Performed by: EMERGENCY MEDICINE

## 2025-07-12 PROCEDURE — 82948 REAGENT STRIP/BLOOD GLUCOSE: CPT

## 2025-07-12 PROCEDURE — 99285 EMERGENCY DEPT VISIT HI MDM: CPT | Performed by: EMERGENCY MEDICINE

## 2025-07-12 PROCEDURE — 85025 COMPLETE CBC W/AUTO DIFF WBC: CPT | Performed by: EMERGENCY MEDICINE

## 2025-07-12 RX ORDER — OXYCODONE HYDROCHLORIDE 5 MG/1
5 TABLET ORAL ONCE
Refills: 0 | Status: DISCONTINUED | OUTPATIENT
Start: 2025-07-12 | End: 2025-07-13

## 2025-07-13 PROBLEM — F19.10 POLYSUBSTANCE ABUSE (HCC): Status: ACTIVE | Noted: 2025-07-13

## 2025-07-13 PROBLEM — Z79.4 INSULIN DEPENDENT TYPE 2 DIABETES MELLITUS (HCC): Status: ACTIVE | Noted: 2025-07-13

## 2025-07-13 PROBLEM — E11.40 DIABETIC NEUROPATHY (HCC): Status: ACTIVE | Noted: 2025-07-13

## 2025-07-13 PROBLEM — E11.9 INSULIN DEPENDENT TYPE 2 DIABETES MELLITUS (HCC): Status: ACTIVE | Noted: 2025-07-13

## 2025-07-13 PROBLEM — M79.602 CHRONIC PAIN OF LEFT UPPER EXTREMITY: Status: ACTIVE | Noted: 2025-07-13

## 2025-07-13 PROBLEM — F99 PSYCHIATRIC DISORDER: Status: ACTIVE | Noted: 2025-07-13

## 2025-07-13 PROBLEM — Z86.73 HISTORY OF STROKE: Status: ACTIVE | Noted: 2025-07-13

## 2025-07-13 PROBLEM — G89.29 CHRONIC PAIN OF LEFT UPPER EXTREMITY: Status: ACTIVE | Noted: 2025-07-13

## 2025-07-13 LAB
AMPHETAMINES SERPL QL SCN: NEGATIVE
BARBITURATES UR QL: NEGATIVE
BENZODIAZ UR QL: NEGATIVE
BILIRUB UR QL STRIP: NEGATIVE
CLARITY UR: CLEAR
COCAINE UR QL: POSITIVE
COLOR UR: YELLOW
FENTANYL UR QL SCN: NEGATIVE
GLUCOSE SERPL-MCNC: 251 MG/DL (ref 65–140)
GLUCOSE SERPL-MCNC: 265 MG/DL (ref 65–140)
GLUCOSE SERPL-MCNC: 270 MG/DL (ref 65–140)
GLUCOSE SERPL-MCNC: 282 MG/DL (ref 65–140)
GLUCOSE SERPL-MCNC: 334 MG/DL (ref 65–140)
GLUCOSE UR STRIP-MCNC: ABNORMAL MG/DL
HGB UR QL STRIP.AUTO: NEGATIVE
HYDROCODONE UR QL SCN: NEGATIVE
KETONES UR STRIP-MCNC: NEGATIVE MG/DL
LEUKOCYTE ESTERASE UR QL STRIP: NEGATIVE
METHADONE UR QL: NEGATIVE
NITRITE UR QL STRIP: NEGATIVE
OPIATES UR QL SCN: NEGATIVE
OXYCODONE+OXYMORPHONE UR QL SCN: POSITIVE
PCP UR QL: NEGATIVE
PH UR STRIP.AUTO: 6 [PH]
PROT UR STRIP-MCNC: NEGATIVE MG/DL
SP GR UR STRIP.AUTO: 1.02 (ref 1–1.03)
THC UR QL: POSITIVE
UROBILINOGEN UR QL STRIP.AUTO: 1 E.U./DL

## 2025-07-13 PROCEDURE — 81003 URINALYSIS AUTO W/O SCOPE: CPT | Performed by: INTERNAL MEDICINE

## 2025-07-13 PROCEDURE — 80307 DRUG TEST PRSMV CHEM ANLYZR: CPT | Performed by: INTERNAL MEDICINE

## 2025-07-13 PROCEDURE — 82948 REAGENT STRIP/BLOOD GLUCOSE: CPT

## 2025-07-13 PROCEDURE — 99223 1ST HOSP IP/OBS HIGH 75: CPT | Performed by: INTERNAL MEDICINE

## 2025-07-13 RX ORDER — ACETAMINOPHEN 325 MG/1
650 TABLET ORAL EVERY 6 HOURS PRN
Status: DISCONTINUED | OUTPATIENT
Start: 2025-07-13 | End: 2025-07-13

## 2025-07-13 RX ORDER — ONDANSETRON 2 MG/ML
4 INJECTION INTRAMUSCULAR; INTRAVENOUS EVERY 4 HOURS PRN
Status: DISCONTINUED | OUTPATIENT
Start: 2025-07-13 | End: 2025-07-15 | Stop reason: HOSPADM

## 2025-07-13 RX ORDER — PRAZOSIN HYDROCHLORIDE 1 MG/1
1 CAPSULE ORAL
Status: DISCONTINUED | OUTPATIENT
Start: 2025-07-13 | End: 2025-07-15 | Stop reason: HOSPADM

## 2025-07-13 RX ORDER — INSULIN LISPRO 100 [IU]/ML
1-6 INJECTION, SOLUTION INTRAVENOUS; SUBCUTANEOUS
Status: DISCONTINUED | OUTPATIENT
Start: 2025-07-13 | End: 2025-07-15 | Stop reason: HOSPADM

## 2025-07-13 RX ORDER — OXYCODONE HYDROCHLORIDE 5 MG/1
5 TABLET ORAL ONCE
Refills: 0 | Status: COMPLETED | OUTPATIENT
Start: 2025-07-13 | End: 2025-07-13

## 2025-07-13 RX ORDER — NICOTINE 21 MG/24HR
1 PATCH, TRANSDERMAL 24 HOURS TRANSDERMAL DAILY
Status: DISCONTINUED | OUTPATIENT
Start: 2025-07-13 | End: 2025-07-15 | Stop reason: HOSPADM

## 2025-07-13 RX ORDER — ATORVASTATIN CALCIUM 40 MG/1
80 TABLET, FILM COATED ORAL
Status: DISCONTINUED | OUTPATIENT
Start: 2025-07-13 | End: 2025-07-15 | Stop reason: HOSPADM

## 2025-07-13 RX ORDER — OXYCODONE HYDROCHLORIDE 10 MG/1
10 TABLET ORAL EVERY 6 HOURS PRN
Refills: 0 | Status: DISCONTINUED | OUTPATIENT
Start: 2025-07-13 | End: 2025-07-13

## 2025-07-13 RX ORDER — GABAPENTIN 300 MG/1
300 CAPSULE ORAL 3 TIMES DAILY
Status: DISCONTINUED | OUTPATIENT
Start: 2025-07-13 | End: 2025-07-13

## 2025-07-13 RX ORDER — CYCLOBENZAPRINE HCL 10 MG
10 TABLET ORAL 3 TIMES DAILY
Status: DISCONTINUED | OUTPATIENT
Start: 2025-07-13 | End: 2025-07-15 | Stop reason: HOSPADM

## 2025-07-13 RX ORDER — INSULIN GLARGINE 100 [IU]/ML
30 INJECTION, SOLUTION SUBCUTANEOUS EVERY 12 HOURS SCHEDULED
Status: DISCONTINUED | OUTPATIENT
Start: 2025-07-13 | End: 2025-07-13

## 2025-07-13 RX ORDER — FLUTICASONE FUROATE AND VILANTEROL 100; 25 UG/1; UG/1
1 POWDER RESPIRATORY (INHALATION)
Status: DISCONTINUED | OUTPATIENT
Start: 2025-07-13 | End: 2025-07-15 | Stop reason: HOSPADM

## 2025-07-13 RX ORDER — CYCLOBENZAPRINE HCL 10 MG
10 TABLET ORAL 3 TIMES DAILY PRN
Status: DISCONTINUED | OUTPATIENT
Start: 2025-07-13 | End: 2025-07-13

## 2025-07-13 RX ORDER — GABAPENTIN 300 MG/1
600 CAPSULE ORAL 3 TIMES DAILY
Status: DISCONTINUED | OUTPATIENT
Start: 2025-07-13 | End: 2025-07-15 | Stop reason: HOSPADM

## 2025-07-13 RX ORDER — ACETAMINOPHEN 325 MG/1
975 TABLET ORAL EVERY 8 HOURS SCHEDULED
Status: DISCONTINUED | OUTPATIENT
Start: 2025-07-13 | End: 2025-07-15 | Stop reason: HOSPADM

## 2025-07-13 RX ORDER — ALBUTEROL SULFATE 90 UG/1
2 INHALANT RESPIRATORY (INHALATION) EVERY 4 HOURS PRN
Status: DISCONTINUED | OUTPATIENT
Start: 2025-07-13 | End: 2025-07-15 | Stop reason: HOSPADM

## 2025-07-13 RX ORDER — MORPHINE SULFATE 15 MG/1
15 TABLET ORAL EVERY 6 HOURS PRN
Refills: 0 | Status: DISCONTINUED | OUTPATIENT
Start: 2025-07-13 | End: 2025-07-14

## 2025-07-13 RX ORDER — MORPHINE SULFATE 15 MG/1
7.5 TABLET ORAL EVERY 6 HOURS PRN
Refills: 0 | Status: DISCONTINUED | OUTPATIENT
Start: 2025-07-13 | End: 2025-07-14

## 2025-07-13 RX ORDER — DULOXETIN HYDROCHLORIDE 60 MG/1
60 CAPSULE, DELAYED RELEASE ORAL DAILY
Status: DISCONTINUED | OUTPATIENT
Start: 2025-07-13 | End: 2025-07-15 | Stop reason: HOSPADM

## 2025-07-13 RX ORDER — KETOTIFEN FUMARATE 0.35 MG/ML
1 SOLUTION/ DROPS OPHTHALMIC 2 TIMES DAILY PRN
Status: DISCONTINUED | OUTPATIENT
Start: 2025-07-13 | End: 2025-07-15 | Stop reason: HOSPADM

## 2025-07-13 RX ORDER — LISINOPRIL 20 MG/1
20 TABLET ORAL DAILY
Status: DISCONTINUED | OUTPATIENT
Start: 2025-07-13 | End: 2025-07-15 | Stop reason: HOSPADM

## 2025-07-13 RX ORDER — LIDOCAINE 40 MG/G
CREAM TOPICAL 4 TIMES DAILY PRN
Status: DISCONTINUED | OUTPATIENT
Start: 2025-07-13 | End: 2025-07-15 | Stop reason: HOSPADM

## 2025-07-13 RX ORDER — ASPIRIN 81 MG/1
81 TABLET, CHEWABLE ORAL DAILY
Status: DISCONTINUED | OUTPATIENT
Start: 2025-07-13 | End: 2025-07-15 | Stop reason: HOSPADM

## 2025-07-13 RX ORDER — HYDROMORPHONE HCL/PF 1 MG/ML
0.5 SYRINGE (ML) INJECTION
Status: DISCONTINUED | OUTPATIENT
Start: 2025-07-13 | End: 2025-07-13

## 2025-07-13 RX ORDER — LANOLIN ALCOHOL/MO/W.PET/CERES
400 CREAM (GRAM) TOPICAL 2 TIMES DAILY
Status: DISCONTINUED | OUTPATIENT
Start: 2025-07-13 | End: 2025-07-15 | Stop reason: HOSPADM

## 2025-07-13 RX ORDER — INSULIN GLARGINE 100 [IU]/ML
35 INJECTION, SOLUTION SUBCUTANEOUS EVERY 12 HOURS SCHEDULED
Status: DISCONTINUED | OUTPATIENT
Start: 2025-07-13 | End: 2025-07-14

## 2025-07-13 RX ORDER — OXYCODONE HYDROCHLORIDE 5 MG/1
5 TABLET ORAL EVERY 6 HOURS PRN
Refills: 0 | Status: DISCONTINUED | OUTPATIENT
Start: 2025-07-13 | End: 2025-07-13

## 2025-07-13 RX ADMIN — PRAZOSIN HYDROCHLORIDE 1 MG: 1 CAPSULE ORAL at 21:40

## 2025-07-13 RX ADMIN — INSULIN LISPRO 4 UNITS: 100 INJECTION, SOLUTION INTRAVENOUS; SUBCUTANEOUS at 21:22

## 2025-07-13 RX ADMIN — INSULIN LISPRO 4 UNITS: 100 INJECTION, SOLUTION INTRAVENOUS; SUBCUTANEOUS at 16:48

## 2025-07-13 RX ADMIN — CYCLOBENZAPRINE 10 MG: 10 TABLET, FILM COATED ORAL at 21:22

## 2025-07-13 RX ADMIN — INSULIN LISPRO 4 UNITS: 100 INJECTION, SOLUTION INTRAVENOUS; SUBCUTANEOUS at 11:21

## 2025-07-13 RX ADMIN — ATORVASTATIN CALCIUM 80 MG: 40 TABLET, FILM COATED ORAL at 16:48

## 2025-07-13 RX ADMIN — MORPHINE SULFATE 15 MG: 15 TABLET ORAL at 18:28

## 2025-07-13 RX ADMIN — MORPHINE SULFATE 15 MG: 15 TABLET ORAL at 10:15

## 2025-07-13 RX ADMIN — NICOTINE 1 PATCH: 21 PATCH, EXTENDED RELEASE TRANSDERMAL at 08:12

## 2025-07-13 RX ADMIN — ACETAMINOPHEN 975 MG: 325 TABLET, FILM COATED ORAL at 14:41

## 2025-07-13 RX ADMIN — OXYCODONE 5 MG: 5 TABLET ORAL at 01:10

## 2025-07-13 RX ADMIN — GABAPENTIN 600 MG: 300 CAPSULE ORAL at 16:48

## 2025-07-13 RX ADMIN — NICOTINE 1 PATCH: 21 PATCH, EXTENDED RELEASE TRANSDERMAL at 02:23

## 2025-07-13 RX ADMIN — INSULIN GLARGINE 35 UNITS: 100 INJECTION, SOLUTION SUBCUTANEOUS at 18:28

## 2025-07-13 RX ADMIN — INSULIN LISPRO 5 UNITS: 100 INJECTION, SOLUTION INTRAVENOUS; SUBCUTANEOUS at 07:46

## 2025-07-13 RX ADMIN — MORPHINE SULFATE 15 MG: 15 TABLET ORAL at 03:48

## 2025-07-13 RX ADMIN — RIVAROXABAN 20 MG: 20 TABLET, FILM COATED ORAL at 08:00

## 2025-07-13 RX ADMIN — LISINOPRIL 20 MG: 20 TABLET ORAL at 08:00

## 2025-07-13 RX ADMIN — GABAPENTIN 600 MG: 300 CAPSULE ORAL at 21:22

## 2025-07-13 RX ADMIN — DULOXETINE HYDROCHLORIDE 60 MG: 60 CAPSULE, DELAYED RELEASE ORAL at 08:00

## 2025-07-13 RX ADMIN — LIDOCAINE: 40 CREAM TOPICAL at 16:48

## 2025-07-13 RX ADMIN — MORPHINE SULFATE 2 MG: 2 INJECTION, SOLUTION INTRAMUSCULAR; INTRAVENOUS at 07:33

## 2025-07-13 RX ADMIN — GABAPENTIN 600 MG: 300 CAPSULE ORAL at 08:00

## 2025-07-13 RX ADMIN — CYCLOBENZAPRINE 10 MG: 10 TABLET, FILM COATED ORAL at 16:48

## 2025-07-13 RX ADMIN — ACETAMINOPHEN 975 MG: 325 TABLET, FILM COATED ORAL at 21:22

## 2025-07-13 RX ADMIN — Medication 400 MG: at 08:00

## 2025-07-13 RX ADMIN — Medication 400 MG: at 17:32

## 2025-07-13 RX ADMIN — PRAZOSIN HYDROCHLORIDE 1 MG: 1 CAPSULE ORAL at 02:22

## 2025-07-13 RX ADMIN — INSULIN GLARGINE 30 UNITS: 100 INJECTION, SOLUTION SUBCUTANEOUS at 07:46

## 2025-07-13 RX ADMIN — ASPIRIN 81 MG 81 MG: 81 TABLET ORAL at 08:00

## 2025-07-13 RX ADMIN — LIDOCAINE: 40 CREAM TOPICAL at 10:17

## 2025-07-13 NOTE — ASSESSMENT & PLAN NOTE
Patient presented with worsening RLE pain, most prominently along sole of right foot. Hx extensive PAD in bilateral LE, s/p left AKA for nonsalvageable LLE in 2023 due to occluded prior left fem-BK pop bypass.   Recent RLE angiogram with IR (5/30/25): Multifocal critical stenosis involving entirety of the right SFA and popliteal, s/p angioplasty & catheterization of the tibioperoneal trunk with thrombolytic.   Arterial duplex RLE: High grade stenosis versus occlusion in the proximal, mid and distal superficial femoral artery with reconstitution distally in the proximal popliteal artery.  NIKKI severe at 0.55, great toe pressures worse at 23 mmHg compared to prior 57 mmHg.  Hold Xarelto, last dose this a.m. on 7/14 & will switch to heparin GTT starting a.m. of 7/15 given hx CVA  Vascular surgery & IR consulted, patient will need RLE angiogram later this week possibly as boomerang vs transfer pending IR availability  Resume PTA aspirin 81 mg daily, atorvastatin 80 mg daily  Strongly encourage smoking cessation, educated on importance of medication compliance

## 2025-07-13 NOTE — ASSESSMENT & PLAN NOTE
Lab Results   Component Value Date    HGBA1C 13.3 (H) 05/26/2025     Presents with acute on chronic arm/leg pains in the setting of known diabetic neuropathy and uncontrolled insulin-dependent diabetes mellitus -> pain regimen on board and will optimize a course of Gabapentin  Noncompliant with home insulin over the last several days due to lack of insulin supplies as patient endorsed to ED provider that her pharmacy no longer covers current regimen  Restart basal insulin with additional SSI coverage per [employer] -> will appreciate case management assistance in medication affordability options  Carbohydrate restriction  Hypoglycemia protocol

## 2025-07-13 NOTE — ED PROVIDER NOTES
Time reflects when diagnosis was documented in both MDM as applicable and the Disposition within this note       Time User Action Codes Description Comment    7/13/2025 12:20 AM Jose Sanabria [E11.40] Diabetic neuropathy (HCC)     7/13/2025 12:20 AM Jose Sanabria [E11.9,  Z79.4] Diabetes mellitus type 2, insulin dependent (HCC)     7/13/2025 12:20 AM Jose Sanabria [M79.642] Left hand pain     7/13/2025 12:20 AM Jose Sanabria [M79.671] Right foot pain     7/13/2025 12:27 AM Jose Sanabria [Z78.9] Difficulty refilling prescriptions           ED Disposition       ED Disposition   Admit    Condition   Stable    Date/Time   Sun Jul 13, 2025 12:27 AM    Comment   Case was discussed with Dr. Green and the patient's admission status was agreed to be Admission Status: observation status to the service of [facility].               Assessment & Plan   {Hyperlinks  Risk Stratification - NIHSS - HEART SCORE - Fill out sepsis note and make sure you call [id number] if severe or septic shock:[id number]}    Medical Decision Making  Amount and/or Complexity of Data Reviewed  Labs: ordered. Decision-making details documented in ED Course.    Risk  Prescription drug management.        ED Course as of 07/13/25 0027   Sat Jul 12, 2025   2332 POC Glucose(!): 238       Medications   oxyCODONE (ROXICODONE) IR tablet 5 mg (5 mg Oral Not Given 7/12/25 2314)       ED Risk Strat Scores                  No data recorded        SBIRT 22yo+      Flowsheet Row Most Recent Value   Initial Alcohol Screen: US AUDIT-C     1. How often do you have a drink containing alcohol? 0 Filed at: 07/12/2025 2247   2. How many drinks containing alcohol do you have on a typical day you are drinking?  0 Filed at: 07/12/2025 2247   3b. FEMALE Any Age, or MALE 65+: How often do you have 4 or more drinks on one occassion? 0 Filed at: 07/12/2025 2247   Audit-C Score 0 Filed at: 07/12/2025 2247   CRISTÓBAL: How many times in the past year have  you...    Used an illegal drug or used a prescription medication for non-medical reasons? Never Filed at: 07/12/2025 9893                            History of Present Illness   {Hyperlinks  History (Med, Surg, Fam, Social) - Current Medications - Allergies  :[id number]}    Chief Complaint   Patient presents with    Hand Pain     Patient brought in via EMS for L hand/R foot pain       Past Medical History[1]   Past Surgical History[2]   Family History[3]   Social History[4]   E-Cigarette/Vaping    E-Cigarette Use Never User       E-Cigarette/Vaping Substances    Nicotine No     THC No     CBD No     Flavoring No     Other No     Unknown No       I have reviewed and agree with the history as documented.     38 y/o female with hx of insulin-dependent diabetes, peripheral neuropathy, HTN, COPD, bipolar disorder, and left AKA    [facility] Dr. Green, can I talk to you about this patient for obs admission? 38 y/o female with hx of insulin-dependent diabetes, peripheral neuropathy, HTN, COPD, bipolar disorder, and left AKA.  Presented for her left hand and right foot pain, which are from her diabetic neuropathy, however she is also having issues with medication insecurity.  She was supposed to get her insulin and other medications delivered 3 days ago, however this did not happen, and when she called the pharmacy she was told they were not covered.  She is mildly hyperglycemic in the 200s, however the rest of her labs are normal.          Review of Systems   Constitutional:  Negative for chills and fever.   HENT:  Negative for congestion, rhinorrhea and sore throat.    Respiratory:  Negative for cough and shortness of breath.    Cardiovascular:  Negative for chest pain and palpitations.   Gastrointestinal:  Negative for abdominal pain, diarrhea, nausea and vomiting.   Genitourinary:  Negative for dysuria and hematuria.   Musculoskeletal:  Negative for back pain and neck pain.        Right foot pain, left hand pain, see HPI    Neurological:  Negative for dizziness, weakness, light-headedness, numbness and headaches.   All other systems reviewed and are negative.          Objective   {Hyperlinks  Historical Vitals - Historical Labs - Chart Review/Microbiology - Last Echo - Code Status  :[id number]}    ED Triage Vitals [07/12/25 2245]   Temperature Pulse Blood Pressure Respirations SpO2 Patient Position - Orthostatic VS   98.7 °F (37.1 °C) 88 142/82 21 100 % Lying      Temp Source Heart Rate Source BP Location FiO2 (%) Pain Score    Oral Monitor -- -- --      Vitals      Date and Time Temp Pulse SpO2 Resp BP Pain Score FACES Pain Rating User   07/12/25 2245 98.7 °F (37.1 °C) 88 100 % 21 142/82 -- -- REINIER            Physical Exam  Vitals and nursing note reviewed.   Constitutional:       General: She is not in acute distress.     Appearance: Normal appearance. She is obese. She is not ill-appearing.   HENT:      Head: Normocephalic and atraumatic.      Right Ear: External ear normal.      Left Ear: External ear normal.      Nose: Nose normal. No congestion or rhinorrhea.      Mouth/Throat:      Mouth: Mucous membranes are moist.      Pharynx: Oropharynx is clear. No oropharyngeal exudate or posterior oropharyngeal erythema.     Eyes:      Extraocular Movements: Extraocular movements intact.      Conjunctiva/sclera: Conjunctivae normal.      Pupils: Pupils are equal, round, and reactive to light.       Cardiovascular:      Rate and Rhythm: Normal rate and regular rhythm.      Pulses: Normal pulses.      Heart sounds: Normal heart sounds. No murmur heard.  Pulmonary:      Effort: Pulmonary effort is normal. No respiratory distress.      Breath sounds: Normal breath sounds. No wheezing or rales.   Abdominal:      General: Abdomen is flat. Bowel sounds are normal. There is no distension.      Palpations: Abdomen is soft.      Tenderness: There is no abdominal tenderness. There is no right CVA tenderness, left CVA tenderness or guarding.      Musculoskeletal:         General: No swelling or tenderness. Normal range of motion.      Cervical back: Normal range of motion and neck supple. No tenderness.      Comments: S/P left AKA.  2+ pulses in the bilateral upper extremities and distal right lower extremity.  Neurovascularly intact.  Compartments are soft.  No overlying skin changes.  No bony tenderness or deformity.     Skin:     General: Skin is warm and dry.      Capillary Refill: Capillary refill takes less than 2 seconds.     Neurological:      General: No focal deficit present.      Mental Status: She is alert and oriented to person, place, and time.      Sensory: No sensory deficit.      Motor: No weakness.         Results Reviewed       Procedure Component Value Units Date/Time    Comprehensive metabolic panel [[id number]]  (Abnormal) Collected: 07/12/25 2332    Lab Status: Final result Specimen: Blood from Arm, Left Updated: 07/12/25 2354     Sodium 135 mmol/L      Potassium 3.5 mmol/L      Chloride 104 mmol/L      CO2 24 mmol/L      ANION GAP 7 mmol/L      BUN 22 mg/dL      Creatinine 0.50 mg/dL      Glucose 220 mg/dL      Calcium 8.8 mg/dL      AST 10 U/L      ALT 14 U/L      Alkaline Phosphatase 91 U/L      Total Protein 6.8 g/dL      Albumin 3.5 g/dL      Total Bilirubin 0.35 mg/dL      eGFR 123 ml/min/1.73sq m     Narrative:      National Kidney Disease Foundation guidelines for Chronic Kidney Disease (CKD):     Stage 1 with normal or high GFR (GFR > 90 mL/min/1.73 square meters)    Stage 2 Mild CKD (GFR = 60-89 mL/min/1.73 square meters)    Stage 3A Moderate CKD (GFR = 45-59 mL/min/1.73 square meters)    Stage 3B Moderate CKD (GFR = 30-44 mL/min/1.73 square meters)    Stage 4 Severe CKD (GFR = 15-29 mL/min/1.73 square meters)    Stage 5 End Stage CKD (GFR <15 mL/min/1.73 square meters)  Note: GFR calculation is accurate only with a steady state creatinine    CBC and differential [[id number]]  (Abnormal) Collected: 07/12/25 2332    Lab  Status: Final result Specimen: Blood from Arm, Left Updated: 07/12/25 2336     WBC 9.65 Thousand/uL      RBC 4.86 Million/uL      Hemoglobin 12.6 g/dL      Hematocrit 35.6 %      MCV 73 fL      MCH 25.9 pg      MCHC 35.4 g/dL      RDW 14.8 %      MPV 10.2 fL      Platelets 358 Thousands/uL      nRBC 0 /100 WBCs      Segmented % 56 %      Immature Grans % 0 %      Lymphocytes % 34 %      Monocytes % 6 %      Eosinophils Relative 4 %      Basophils Relative 0 %      Absolute Neutrophils 5.35 Thousands/µL      Absolute Immature Grans 0.03 Thousand/uL      Absolute Lymphocytes 3.32 Thousands/µL      Absolute Monocytes 0.55 Thousand/µL      Eosinophils Absolute 0.37 Thousand/µL      Basophils Absolute 0.03 Thousands/µL     Hemoglobin A1C [[id number]] Collected: 07/12/25 2332    Lab Status: In process Specimen: Blood from Arm, Left Updated: 07/12/25 2334    Fingerstick Glucose (POCT) [[id number]]  (Abnormal) Collected: 07/12/25 2300    Lab Status: Final result Specimen: Blood Updated: 07/12/25 2301     POC Glucose 238 mg/dl             No orders to display       Procedures    ED Medication and Procedure Management   Prior to Admission Medications   Prescriptions Last Dose Informant Patient Reported? Taking?   Accu-Chek FastClix Lancets MISC   No No   Sig: Use in the morning and at noon and in the evening. May substitute brand based on insurance coverage. Check glucose ACHS.   Advair -21 MCG/ACT inhaler   No No   Sig: Inhale 2 puffs 2 (two) times a day Rinse mouth after use   Alcohol Swabs (B-D SINGLE USE SWABS REGULAR) PADS   No No   Sig: Apply topically in the morning   Alcohol Swabs 70 % PADS   No No   Sig: May substitute brand based on insurance coverage. Check glucose ACHS.   Blood Glucose Monitoring Suppl (ONE TOUCH ULTRA 2) w/Device KIT  Self No No   Sig: BY DEVICE ROUTE 2 (TWO) TIMES A DAY CHECK BLOOD SUGARS AND RECORD VALUE AND TIME OF DAY TWICE A DAY   Blood Glucose Monitoring Suppl (OneTouch Verio Reflect)  "w/Device KIT   No No   Sig: May substitute brand based on insurance coverage. Check glucose ACHS.   Blood Pressure Monitoring (Blood Pressure Monitor Automat) KATLYN  Self No No   Sig: Use Daily as Directed   DULoxetine (CYMBALTA) 30 mg delayed release capsule   No No   Sig: Take 2 capsules (60 mg total) by mouth daily   Global Inject Ease Lancets 30G MISC   No No   Sig: Use 3 (three) times a day   INS SYRINGE/NEEDLE .5CC/28G (B-D INS SYR MICROFINE .5CC/28G) 28G X 1/2\" 0.5 ML MISC   No No   Sig: Use every 12 (twelve) hours Use as directed to inject tresiba Q12H   Insulin Pen Needle (BD Pen Needle Leann 2nd Gen) 32G X 4 MM MISC   No No   Sig: For use with insulin pen. Pharmacy may dispense brand covered by insurance.   Insulin Pen Needle (BD Pen Needle Leann 2nd Gen) 32G X 4 MM MISC   No No   Sig: Inject into a muscle in the morning and at noon and in the evening and before bedtime.   Insulin Pen Needle (Comfort EZ Pen Needles) 33G X 4 MM MISC   No No   Sig: Inject into a muscle in the morning and at noon and in the evening. Use as directed to administer insulin.   Ketotifen Fumarate (ZADITOR) 0.035 % ophthalmic solution   No No   Sig: Administer 1 drop to both eyes 2 (two) times a day as needed (for eye discomfort)   Lantus 100 UNIT/ML subcutaneous injection   No No   Sig: Inject 30 Units under the skin every 12 (twelve) hours   Sure Comfort Insulin Syringe 31G X 5/16\" 0.3 ML MISC  Self Yes No   Si (two) times a day Use as directed   Trulicity 1.5 MG/0.5ML SOAJ   No No   Sig: Inject 0.75 mg under the skin every 7 days   acetaminophen (TYLENOL) 500 mg tablet   No No   Sig: Take 2 tablets (1,000 mg total) by mouth every 8 (eight) hours as needed for mild pain   albuterol (PROVENTIL HFA,VENTOLIN HFA) 90 mcg/act inhaler   No No   Sig: Inhale 2 puffs every 4 (four) hours as needed for wheezing or shortness of breath   aspirin 81 mg chewable tablet   No No   Sig: Chew 1 tablet (81 mg total) daily   atorvastatin (LIPITOR) " 80 mg tablet   No No   Sig: Take 1 tablet (80 mg total) by mouth daily with dinner   cyclobenzaprine (FLEXERIL) 10 mg tablet   No No   Sig: Take 1 tablet (10 mg total) by mouth 3 (three) times a day as needed for muscle spasms   glucose blood (OneTouch Verio) test strip   No No   Sig: May substitute brand based on insurance coverage. Check glucose ACHS.   glycerin-hypromellose- (ARTIFICIAL TEARS) 0.2-0.2-1 % SOLN   No No   Sig: Administer 2 drops to both eyes every 6 (six) hours as needed (for eye discomfort)   insulin degludec (Tresiba FlexTouch) 100 units/mL injection pen   No No   Sig: Inject 35 Units under the skin every 12 (twelve) hours   lisinopril (ZESTRIL) 20 mg tablet   No No   Sig: Take 1 tablet (20 mg total) by mouth daily   magnesium Oxide (MAG-OX) 400 mg TABS   No No   Sig: Take 1 tablet (400 mg total) by mouth 2 (two) times a day   metFORMIN (GLUCOPHAGE) 1000 MG tablet   No No   Sig: Take 1 tablet (1,000 mg total) by mouth 2 (two) times a day with meals   metFORMIN (GLUCOPHAGE) 1000 MG tablet   No No   Sig: Take 1 tablet (1,000 mg total) by mouth 2 (two) times a day with meals   nicotine (NICODERM CQ) 21 mg/24 hr TD 24 hr patch   No No   Sig: Place 1 patch on the skin daily over 24 hours   prazosin (MINIPRESS) 1 mg capsule   No No   Sig: Take 1 capsule (1 mg total) by mouth daily at bedtime   rivaroxaban (Xarelto) 20 mg tablet   No No   Sig: Take 1 tablet (20 mg total) by mouth daily with breakfast      Facility-Administered Medications: None     Patient's Medications   Discharge Prescriptions    No medications on file     No discharge procedures on file.  ED SEPSIS DOCUMENTATION   Time reflects when diagnosis was documented in both MDM as applicable and the Disposition within this note       Time User Action Codes Description Comment    7/13/2025 12:20 AM Jose Sanabria [E11.40] Diabetic neuropathy (HCC)     7/13/2025 12:20 AM Jose Sanabria [E11.9,  Z79.4] Diabetes mellitus type 2,  insulin dependent (HCC)     2025 12:20 AM Jose Sanabria [M79.642] Left hand pain     2025 12:20 AM Jose Sanabria [M79.671] Right foot pain     2025 12:27 AM Jose Sanabria [Z78.9] Difficulty refilling prescriptions                    [1]   Past Medical History:  Diagnosis Date    Asthma     Atherosclerosis     Bipolar 1 disorder (HCC)     COPD (chronic obstructive pulmonary disease) (HCC)     Depression     Diabetes mellitus (HCC)     Hypertension     Psychiatric disorder     cutting history    PTSD (post-traumatic stress disorder)     Schizoaffective disorder (HCC)     Tendonitis    [2]   Past Surgical History:  Procedure Laterality Date    AMPUTATION ABOVE KNEE (AKA) Left 3/29/2023    Procedure: AMPUTATION ABOVE KNEE (AKA);  Surgeon: Savi Langford MD;  Location: [facility];  Service: Vascular    BYPASS FEMORAL-POPLITEAL Left 2021    Procedure: Left Common Femoral Below Knee to Popliteal Bypass with Insitu GSV graft.  Left Lower Extremity Angiogram;  Surgeon: Savi Langford MD;  Location: [facility];  Service: Vascular     SECTION      EAR SURGERY      FL LUMBAR PUNCTURE DIAGNOSTIC  10/25/2018    IR LOWER EXTREMITY ANGIOGRAM  2021    IR LOWER EXTREMITY ANGIOGRAM  2021    IR LOWER EXTREMITY ANGIOGRAM  3/24/2023    IR LOWER EXTREMITY ANGIOGRAM  2025    FL SLCTV CATHJ 3RD+ ORD SLCTV ABDL PEL/LXTR BRNCH Left 3/24/2023    Procedure: Left leg angiogram;  Surgeon: Byron Wahl DO;  Location: [facility];  Service: Vascular    TUBAL LIGATION     [3]   Family History  Problem Relation Name Age of Onset    Hypertension Mother      Diabetes Mother      HIV Mother      Heart disease Mother      No Known Problems Father     [4]   Social History  Tobacco Use    Smoking status: Every Day     Current packs/day: 1.00     Average packs/day: 1 pack/day for 22.1 years (22.1 ttl pk-yrs)     Types: Cigarettes     Start date: 3/24/2003     Last attempt to quit: 3/24/2023     Passive  "exposure: Current    Smokeless tobacco: Former     Quit date: 4/29/2022   Vaping Use    Vaping status: Never Used   Substance Use Topics    Alcohol use: Not Currently     Comment: not currently    Drug use: Not Currently     Types: \"Crack\" cocaine, Marijuana     Comment: 1 years clean from crack cocaine since 2022     "

## 2025-07-13 NOTE — ASSESSMENT & PLAN NOTE
Status post prior femoral-popliteal bypass and subsequent left AKA  Continue ASA/statin/Xarelto  In regards to RLE pain, recently hospitalized for similar issue just over a month ago at which time patient underwent a boomerang transfer to the [facility] for an angiogram which revealed evidence of multifocal critical stenosis of the right SFA/popliteal vasculature status post SFA/proximal popliteal artery angioplasty and thrombolysis of the tibioperoneal trunk -> follow-up arterial duplex revealed an NIKKI still severe at 0.55 -> due to recurrent presentation with intractable pain, will await vascular surgery consultation  Strongly encourage smoking cessation

## 2025-07-13 NOTE — ASSESSMENT & PLAN NOTE
Lab Results   Component Value Date    HGBA1C 9.8 (H) 07/12/2025   History of uncontrolled T2DM, patient notes noncompliance with insulin due to difficulty with affordability in the past.   Patient reported to ED that she was told her Lantus & Trulicity were no longer covered at her pharmacy, however patient later reported that her prescriptions were scheduled to be delivered to her home on 7/10 but she never received and ran out on Friday 7/11.  Recently seen by PCP on 7/9, was prescribed Lantus 30U BID instead of prior Tresiba 35U BID  Home regimen: Lantus 30U BID, Trulicity 0.75 mg weekly injections, metformin 1000 mg BID.  Hold home Trulicity and metformin while inpatient  Increased Lantus 30->35U BID with ongoing hyperglycemia  SSI coverage with Accu-Cheks ACHS  Carb controlled diet, hypoglycemia protocol

## 2025-07-13 NOTE — ASSESSMENT & PLAN NOTE
Known history of cocaine and cannabinoid abuse, as recently as May 2025  Check urine drug screen  Prior toxicology screens also positive for opiates, however, patient has received opioid prescriptions for pain in the past

## 2025-07-13 NOTE — ASSESSMENT & PLAN NOTE
Suspect that patient's underlying psychiatric/bipolar disorder, as well as impaired mobility s/p left AKA with morbid obesity, polysubstance use & housing/financial insecurity are largely contributing to patient's ongoing noncompliance/poor control of complex medical comorbidities  Patient reported worsening depression, denies SI/HI   Consult psychiatry, resume PTA Cymbalta for now

## 2025-07-13 NOTE — PLAN OF CARE
Problem: PAIN - ADULT  Goal: Verbalizes/displays adequate comfort level or baseline comfort level  Description: Interventions:  - Encourage patient to monitor pain and request assistance  - Assess pain using appropriate pain scale  - Administer analgesics as ordered based on type and severity of pain and evaluate response  - Implement non-pharmacological measures as appropriate and evaluate response  - Consider cultural and social influences on pain and pain management  - Notify physician/advanced practitioner if interventions unsuccessful or patient reports new pain  - Educate patient/family on pain management process including their role and importance of  reporting pain   - Provide non-pharmacologic/complimentary pain relief interventions  Outcome: Progressing     Problem: INFECTION - ADULT  Goal: Absence or prevention of progression during hospitalization  Description: INTERVENTIONS:  - Assess and monitor for signs and symptoms of infection  - Monitor lab/diagnostic results  - Monitor all insertion sites, i.e. indwelling lines, tubes, and drains  - Monitor endotracheal if appropriate and nasal secretions for changes in amount and color  - [place] appropriate cooling/warming therapies per order  - Administer medications as ordered  - Instruct and encourage patient and family to use good hand hygiene technique  - Identify and instruct in appropriate isolation precautions for identified infection/condition  Outcome: Progressing     Problem: INFECTION - ADULT  Goal: Absence of fever/infection during neutropenic period  Description: INTERVENTIONS:  - Monitor WBC  - Perform strict hand hygiene  - Limit to healthy visitors only  - No plants, dried, fresh or silk flowers with argueta in patient room  Outcome: Progressing     Problem: SAFETY ADULT  Goal: Maintain or return to baseline ADL function  Description: INTERVENTIONS:  -  Assess patient's ability to carry out ADLs; assess patient's baseline for ADL function and  identify physical deficits which impact ability to perform ADLs (bathing, care of mouth/teeth, toileting, grooming, dressing, etc.)  - Assess/evaluate cause of self-care deficits   - Assess range of motion  - Assess patient's mobility; develop plan if impaired  - Assess patient's need for assistive devices and provide as appropriate  - Encourage maximum independence but intervene and supervise when necessary  - Involve family in performance of ADLs  - Assess for home care needs following discharge   - Consider OT consult to assist with ADL evaluation and planning for discharge  - Provide patient education as appropriate  - Monitor functional capacity and physical performance, use of AM PAC & JH-HLM   - Monitor gait, balance and fatigue with ambulation    Outcome: Progressing

## 2025-07-13 NOTE — ASSESSMENT & PLAN NOTE
Not in acute exacerbation, currently stable on room air  Continue PTA daily maintenance inhaler, PRN albuterol

## 2025-07-13 NOTE — ASSESSMENT & PLAN NOTE
Lab Results   Component Value Date    HGBA1C 9.8 (H) 07/12/2025     Patient presented with acute on chronic RLE pain as well as left hand pain. Known hx of neuropathy due to uncontrolled T2DM, patient notes noncompliance with insulin in the past due to difficulty with affordability and impaired mobility.  Likely due to diabetic neuropathy with underlying psychosocial and substance abuse issues contributing  Recent admission 5/26-6/2/25 for left hand pain suspected to be due to peripheral diabetic neuropathy  Patient was restarted on gabapentin 300 mg TID during recent admission  Scheduled Tylenol 975 q8hrs, gabapentin 600 mg TID, Flexeril 10 mg TID; PRN PO morphine for moderate & increased IV morphine for severe/breakthrough pain   Likely will need to establish care with pain management specialist upon discharge

## 2025-07-13 NOTE — PLAN OF CARE
Problem: PAIN - ADULT  Goal: Verbalizes/displays adequate comfort level or baseline comfort level  Description: Interventions:  - Encourage patient to monitor pain and request assistance  - Assess pain using appropriate pain scale  - Administer analgesics as ordered based on type and severity of pain and evaluate response  - Implement non-pharmacological measures as appropriate and evaluate response  - Consider cultural and social influences on pain and pain management  - Notify physician/advanced practitioner if interventions unsuccessful or patient reports new pain  - Educate patient/family on pain management process including their role and importance of  reporting pain   - Provide non-pharmacologic/complimentary pain relief interventions  Outcome: Progressing     Problem: SAFETY ADULT  Goal: Maintain or return to baseline ADL function  Description: INTERVENTIONS:  -  Assess patient's ability to carry out ADLs; assess patient's baseline for ADL function and identify physical deficits which impact ability to perform ADLs (bathing, care of mouth/teeth, toileting, grooming, dressing, etc.)  - Assess/evaluate cause of self-care deficits   - Assess range of motion  - Assess patient's mobility; develop plan if impaired  - Assess patient's need for assistive devices and provide as appropriate  - Encourage maximum independence but intervene and supervise when necessary  - Involve family in performance of ADLs  - Assess for home care needs following discharge   - Consider OT consult to assist with ADL evaluation and planning for discharge  - Provide patient education as appropriate  - Monitor functional capacity and physical performance, use of AM PAC & JH-HLM   - Monitor gait, balance and fatigue with ambulation    Outcome: Progressing     Problem: INFECTION - ADULT  Goal: Absence or prevention of progression during hospitalization  Description: INTERVENTIONS:  - Assess and monitor for signs and symptoms of  infection  - Monitor lab/diagnostic results  - Monitor all insertion sites, i.e. indwelling lines, tubes, and drains  - Monitor endotracheal if appropriate and nasal secretions for changes in amount and color  - [place] appropriate cooling/warming therapies per order  - Administer medications as ordered  - Instruct and encourage patient and family to use good hand hygiene technique  - Identify and instruct in appropriate isolation precautions for identified infection/condition  Outcome: Progressing     Problem: DISCHARGE PLANNING  Goal: Discharge to home or other facility with appropriate resources  Description: INTERVENTIONS:  - Identify barriers to discharge w/patient and caregiver  - Arrange for needed discharge resources and transportation as appropriate  - Identify discharge learning needs (meds, wound care, etc.)  - Arrange for interpretive services to assist at discharge as needed  - Refer to Case Management Department for coordinating discharge planning if the patient needs post-hospital services based on physician/advanced practitioner order or complex needs related to functional status, cognitive ability, or social support system  Outcome: Progressing

## 2025-07-13 NOTE — UTILIZATION REVIEW
Initial Clinical Review    Was OBSERVATION START OF CARE 7/12 @ 2248 converted to INPATIENT admission 7/13/25 @ 1517 due to continued stay required to care for patient with DX: DIABETIC NEUROPATHY.    Admission: Date/Time/Statement:   Admission Orders (From admission, onward)       Ordered        07/13/25 1517  INPATIENT ADMISSION  Once            07/13/25 0036  Place in Observation  Once                          Orders Placed This Encounter   Procedures    INPATIENT ADMISSION     Standing Status:   Standing     Number of Occurrences:   1     Level of Care:   Med Surg [16]     Estimated length of stay:   More than 2 Midnights     Certification:   I certify that inpatient services are medically necessary for this patient for a duration of greater than two midnights. See H&P and MD Progress Notes for additional information about the patient's course of treatment.     ED Arrival Information       Expected   7/12/2025 22:41    Arrival   7/12/2025 22:41    Acuity   Urgent              Means of arrival   -    Escorted by   -    Service   Hospitalist    Admission type   Emergency              Arrival complaint   hand pain             Chief Complaint   Patient presents with    Hand Pain     Patient brought in via EMS for L hand/R foot pain     START OF CARE 7/12 @ 2248     Initial Presentation: 39 y.o. female to ED via EMS from home  Present to ED with left hand and right foot pain in the setting of known insulin-dependent diabetes mellitus and noncompliance over the last several days. Of note, she also has a history of PAD status post left AKA. she has been noncompliant with her insulin over the last several days due to a lack of insulin supplies at which time she called her pharmacy and was told that some of her medications were no longer covered, including Trulicity.  In the ED, a BMP revealed a blood sugar of 220 with an Accu-Chek later on reading 251.    PMHX PAD status post left AKA; asthma; Bipolar; COPD; DM; HTN;  PTSD; schitozoaffective disorder  Admitted to OBS with DX: Diabetic neuropathy   on exam: pain on the right lower leg and intermittent pain/numbness of the left arm; Pain 10/10; Cr 0.50; Gluc 220  PLAN: pain control; CM consulted; Continue ASA/statin/Xarelto       Date: 7/13/25 - CHANGED TO INPATIENT   Patient endorses continued severe pain in RLE particularly along sole of right foot, also reports continued numbness on 4th-5th fingers of left hand with neuropathy/burning pain intermittently. Patient expresses continued frustration given her chronic pain and difficulty with managing her medical issues, stating that she cannot do much of anything at home due to pain & has had difficulty with getting her prescriptions delivered to her home from pharmacy and had ran out of her insulin on Friday 7/11.  Patient also states she has been very depressed though no SI/HI, agreeable to discuss with psychiatrist further.   discussed with vascular surgery krystal and will check updated arterial duplex RLE tomorrow.   Plan: pain control; CM consulted; Continue ASA/statin/Xarelto; f/u A1c; f/u arterial duplex      Date: 7/14/25    Day 3: Has surpassed a 2nd midnight with active treatments and services. Require additional inpatient hospital stay due to angiogram, psychiatry evaluation, pain management, safe discharge planning   Reports ongoing severe pain in right foot/lower leg only slightly relieved by IV morphine. Explained to patient arterial duplex results - patient will need RLE angiogram.  ongoing hyperglycemia.    Arterial duplex RLE: High grade stenosis versus occlusion in the proximal, mid and distal superficial femoral artery with reconstitution distally in the proximal popliteal artery.  NIKKI severe at 0.55, great toe pressures worse at 23 mmHg compared to prior 57 mmHg.  Plan: start Heparin gtt; pain control; CM consulted; Continue ASA/statin/Xarelto; f/u A1c;  vascular consulted; IR consulted;insulin adjustments;   consulted      ED Treatment-Medication Administration from 07/12/2025 2240 to 07/13/2025 0121         Date/Time Order Dose Route Action     07/13/2025 0110 oxyCODONE (ROXICODONE) IR tablet 5 mg 5 mg Oral Given            Scheduled Medications:  aspirin, 81 mg, Oral, Daily  atorvastatin, 80 mg, Oral, Daily With Dinner  [Held by provider] Dulaglutide, 0.75 mg, Subcutaneous, Q7 Days  DULoxetine, 60 mg, Oral, Daily  Fluticasone Furoate-Vilanterol, 1 puff, Inhalation, Daily  gabapentin, 600 mg, Oral, TID  insulin glargine, 30 Units, Subcutaneous, Q12H ABDULKADIR  insulin lispro, 1-6 Units, Subcutaneous, 4x Daily (AC & HS)  lisinopril, 20 mg, Oral, Daily  magnesium Oxide, 400 mg, Oral, BID  [Held by provider] metFORMIN, 1,000 mg, Oral, BID With Meals  nicotine, 1 patch, Transdermal, Daily  prazosin, 1 mg, Oral, HS  rivaroxaban, 20 mg, Oral, Daily With Breakfast      Continuous IV Infusions: None  heparin (porcine), 3-20 Units/kg/hr (Order-Specific), Intravenous, Titrated (started 7/14)      PRN Meds:  acetaminophen, 650 mg, Oral, Q6H PRN  albuterol, 2 puff, Inhalation, Q4H PRN  Artificial Tears, 2 drop, Both Eyes, Q6H PRN  cyclobenzaprine, 10 mg, Oral, TID PRN  Ketotifen Fumarate, 1 drop, Both Eyes, BID PRN  morphine, 15 mg, Oral, Q6H PRN: 7/13 x3; 7/14 x2 so far   morphine, 7.5 mg, Oral, Q6H PRN  morphine injection, 2 mg, Intravenous, Q3H PRN: 7/13 x1  ondansetron, 4 mg, Intravenous, Q4H PRN    oxyCODONE (ROXICODONE) IR tablet 5 mg  Dose: 5 mg  Freq: Once Route: PO  Start: 07/13/25 0100 End: 07/13/25 0110    ED Triage Vitals   Temperature Pulse Respirations Blood Pressure SpO2 Pain Score   07/12/25 2245 07/12/25 2245 07/12/25 2245 07/12/25 2245 07/12/25 2245 07/13/25 0110   98.7 °F (37.1 °C) 88 21 142/82 100 % 10 - Worst Possible Pain     Weight (last 2 days)       Date/Time Weight    07/13/25 0134 105 (232.37)            Vital Signs (last 3 days)       Date/Time Temp Pulse Resp BP MAP (mmHg) SpO2 O2 Device Patient Position -  Orthostatic VS Chromo Coma Scale Score Pain    07/14/25 1504 98.2 °F (36.8 °C) 88 20 146/79 -- 98 % None (Room air) Lying -- --    07/14/25 1348 -- -- -- -- -- -- -- -- -- 10 - Worst Possible Pain    07/14/25 1346 -- -- -- -- -- -- -- -- -- 10 - Worst Possible Pain    07/14/25 0742 98 °F (36.7 °C) 86 20 148/85 -- 97 % None (Room air) Lying -- --    07/14/25 0607 -- -- -- -- -- -- -- -- -- 6    07/14/25 0055 -- -- -- -- -- -- -- -- -- 10 - Worst Possible Pain    07/14/25 0001 97.9 °F (36.6 °C) 88 20 130/60 85 98 % None (Room air) Lying -- --    07/13/25 2130 98.6 °F (37 °C) 97 20 140/96 119 -- -- Lying 15 5    07/13/25 1828 -- -- -- -- -- -- -- -- -- 5    07/13/25 1441 -- -- -- -- -- -- -- -- -- 8    07/13/25 1434 98.7 °F (37.1 °C) 94 20 141/67 -- 99 % None (Room air) Lying -- --    07/13/25 1015 -- -- -- -- -- -- -- -- -- 10 - Worst Possible Pain    07/13/25 0900 -- -- -- -- -- -- -- -- 15 --    07/13/25 0750 -- 95 20 145/89 -- -- -- Lying -- --    07/13/25 0733 -- -- -- -- -- -- -- -- -- 10 - Worst Possible Pain    07/13/25 0704 98 °F (36.7 °C) 105 22 146/109 -- 100 % None (Room air) Sitting -- --    07/13/25 0348 -- -- -- -- -- -- -- -- -- 10 - Worst Possible Pain    07/13/25 0222 -- -- -- 145/90 -- -- -- -- -- --    07/13/25 0150 -- -- -- -- -- -- -- -- -- 10 - Worst Possible Pain    07/13/25 0134 98.5 °F (36.9 °C) 79 20 145/90 -- 100 % None (Room air) Lying -- --    07/13/25 0110 -- -- -- -- -- -- -- -- -- 10 - Worst Possible Pain    07/12/25 2300 -- -- -- -- -- -- -- -- 15 --    07/12/25 2245 98.7 °F (37.1 °C) 88 21 142/82 106 100 % None (Room air) Lying -- --              Pertinent Labs/Diagnostic Test Results:      Results from last 7 days   Lab Units 07/14/25  0551 07/12/25 2332   WBC Thousand/uL 7.59 9.65   HEMOGLOBIN g/dL 11.5 12.6   HEMATOCRIT % 33.6* 35.6   PLATELETS Thousands/uL 271 358   TOTAL NEUT ABS Thousands/µL  --  5.35         Results from last 7 days   Lab Units 07/14/25  0551 07/12/25 2332    SODIUM mmol/L 135 135   POTASSIUM mmol/L 4.3 3.5   CHLORIDE mmol/L 104 104   CO2 mmol/L 21 24   ANION GAP mmol/L 10 7   BUN mg/dL 18 22   CREATININE mg/dL 0.50* 0.50*   EGFR ml/min/1.73sq m 123 123   CALCIUM mg/dL 8.3* 8.8   MAGNESIUM mg/dL 2.1  --      Results from last 7 days   Lab Units 07/12/25  2332   AST U/L 10*   ALT U/L 14   ALK PHOS U/L 91   TOTAL PROTEIN g/dL 6.8   ALBUMIN g/dL 3.5   TOTAL BILIRUBIN mg/dL 0.35     Results from last 7 days   Lab Units 07/14/25  0706 07/14/25  0606 07/13/25  2101 07/13/25  1621 07/13/25  1108 07/13/25  0714 07/13/25  0133 07/12/25  2300   POC GLUCOSE mg/dl 256* 247* 282* 265* 270* 334* 251* 238*     Results from last 7 days   Lab Units 07/14/25  0551 07/12/25  2332   GLUCOSE RANDOM mg/dL 177* 220*        BETA-HYDROXYBUTYRATE   Date Value Ref Range Status   08/24/2023 1.1 (H) <0.6 mmol/L Final   05/15/2019 0.3 <0.6 mmol/L Final         Results from last 7 days   Lab Units 07/13/25  0315   CLARITY UA  Clear   COLOR UA  Yellow   SPEC GRAV UA  1.020   PH UA  6.0   GLUCOSE UA mg/dl 3+*   KETONES UA mg/dl Negative   BLOOD UA  Negative   PROTEIN UA mg/dl Negative   NITRITE UA  Negative   BILIRUBIN UA  Negative   UROBILINOGEN UA E.U./dl 1.0   LEUKOCYTES UA  Negative        Results from last 7 days   Lab Units 07/13/25  0316   AMPH/METH  Negative   BARBITURATE UR  Negative   BENZODIAZEPINE UR  Negative   COCAINE UR  Positive*   METHADONE URINE  Negative   OPIATE UR  Negative   PCP UR  Negative   THC UR  Positive*        Past Medical History[1]  Present on Admission:   Morbid obesity   PAD (peripheral artery disease) with right lower extremity pain   Essential hypertension   Tobacco abuse   COPD (chronic obstructive pulmonary disease)/asthma      Admitting Diagnosis: Diabetic neuropathy (HCC) [E11.40]  Hand pain [M79.643]  Left hand pain [M79.642]  Right foot pain [M79.671]  Diabetes mellitus type 2, insulin dependent (HCC) [E11.9, Z79.4]  Difficulty refilling prescriptions  [Z78.9]  Age/Sex: 39 y.o. female    Network Utilization Review Department  ATTENTION: Please call with any questions or concerns to [phone] and carefully listen to the prompts so that you are directed to the right person. All voicemails are confidential.   For Discharge needs, contact Care Management DC Support Team at [phone] opt. 2  Send all requests for admission clinical reviews, approved or denied determinations and any other requests to dedicated fax number below belonging to the [facility] where the patient is receiving treatment. List of dedicated fax numbers for the Facilities:  FACILITY NAME UR FAX NUMBER   ADMISSION DENIALS (Administrative/Medical Necessity) [phone]   DISCHARGE SUPPORT TEAM (NETWORK) [phone]   PARENT CHILD HEALTH (Maternity/NICU/Pediatrics) [phone]   [facility] [phone]   [facility] [phone]   [facility] [phone]   [facility] [phone]   Count includes the [facility] [phone]   [facility] [phone]   [facility] [phone]   [facility] [phone]   [facility] [phone]   Critical access hospital [phone]   [facility] [phone]   [facility] [phone]              [1]   Past Medical History:  Diagnosis Date    Asthma     Atherosclerosis     Bipolar 1 disorder (HCC)     COPD (chronic obstructive pulmonary disease) (HCC)     Depression     Diabetes mellitus (HCC)     Hypertension     Psychiatric disorder     cutting history    PTSD (post-traumatic stress disorder)     Schizoaffective disorder (HCC)     Tendonitis

## 2025-07-13 NOTE — QUICK NOTE
Patient evaluated this morning, wife is also present at bedside. Patient endorses continued severe pain in RLE particularly along sole of right foot, also reports continued numbness on 4th-5th fingers of left hand with neuropathy/burning pain intermittently. Patient expresses continued frustration given her chronic pain and difficulty with managing her medical issues, stating that she cannot do much of anything at home due to pain & has had difficulty with getting her prescriptions delivered to her home from pharmacy and had ran out of her insulin on Friday 7/11.  Patient also states she has been very depressed though no SI/HI, agreeable to discuss with psychiatrist further. I also explained to patient that her urine drug screen came back positive for cocaine, I explained the risks involved especially given severity of her PAD and diabetes.    Vital signs reviewed and are overall stable today. On physical exam patient is awake, alert and not in acute distress or ill-appearing, able to spontaneously move upper extremities without significant difficulty. Normal heart sounds and clear lungs to auscultation, abdomen is soft and nontender with normal bowel sounds. Right lower extremity is warm with no erythema/edema, no open wounds or fissures on right foot but with hypersensitivity/tenderness palpation on right sole.    Labs overnight also stable with benign UA, updated hemoglobin A1c pending. Patient's pain in left hand is likely 2/2 diabetic neuropathy with poorly controlled T2DM, also likely neuropathy component but majority of pain in RLE is likely due to known PAD. Patient's pain appears to be chronic & similar to previous hospitalization, discussed with vascular surgery curporter and will check updated arterial duplex RLE tomorrow.

## 2025-07-13 NOTE — ASSESSMENT & PLAN NOTE
Recently hospitalized just over a month ago for similar issue -> acute stroke was ruled out last month on hospitalization, and patient evaluated by neurology/orthopedic surgery with recommendation of follow-up outpatient for neuromuscular evaluation in approximately 4-6 weeks from that time  Optimize blood sugar control and Gabapentin regimen for underlying neuropathy

## 2025-07-13 NOTE — PLAN OF CARE
Problem: PAIN - ADULT  Goal: Verbalizes/displays adequate comfort level or baseline comfort level  Description: Interventions:  - Encourage patient to monitor pain and request assistance  - Assess pain using appropriate pain scale  - Administer analgesics as ordered based on type and severity of pain and evaluate response  - Implement non-pharmacological measures as appropriate and evaluate response  - Consider cultural and social influences on pain and pain management  - Notify physician/advanced practitioner if interventions unsuccessful or patient reports new pain  - Educate patient/family on pain management process including their role and importance of  reporting pain   - Provide non-pharmacologic/complimentary pain relief interventions  7/13/2025 0645 by Carroll Quiroz RN  Outcome: Progressing  7/13/2025 0201 by Carroll Quiroz RN  Outcome: Progressing     Problem: INFECTION - ADULT  Goal: Absence or prevention of progression during hospitalization  Description: INTERVENTIONS:  - Assess and monitor for signs and symptoms of infection  - Monitor lab/diagnostic results  - Monitor all insertion sites, i.e. indwelling lines, tubes, and drains  - Monitor endotracheal if appropriate and nasal secretions for changes in amount and color  - [place] appropriate cooling/warming therapies per order  - Administer medications as ordered  - Instruct and encourage patient and family to use good hand hygiene technique  - Identify and instruct in appropriate isolation precautions for identified infection/condition  7/13/2025 0645 by Carroll Quiroz RN  Outcome: Progressing  7/13/2025 0201 by Carroll Quiroz RN  Outcome: Progressing     Problem: INFECTION - ADULT  Goal: Absence of fever/infection during neutropenic period  Description: INTERVENTIONS:  - Monitor WBC  - Perform strict hand hygiene  - Limit to healthy visitors only  - No plants, dried, fresh or silk flowers with argueta in patient room  7/13/2025 0645 by Carroll  JEN Quiroz  Outcome: Progressing  7/13/2025 0201 by Carroll Quiroz RN  Outcome: Progressing

## 2025-07-13 NOTE — ASSESSMENT & PLAN NOTE
BMI of 43.90, affects all aspects of care  Obesity likely contributing to poor mobility  Recommend dietary/lifestyle modifications

## 2025-07-13 NOTE — ASSESSMENT & PLAN NOTE
Known history of prior strokes, evidence of chronic infarcts noted on CT imaging in 2024  Recent MRI brain showed no acute infarction though multifocal chronic infarctions  Resume PTA aspirin, atorvastatin  Hold Xarelto, switch to heparin GTT for angiogram

## 2025-07-13 NOTE — ASSESSMENT & PLAN NOTE
Known history of cocaine and cannabinoid abuse, noted to have positive UDS during admission in May 2025  UDS this admission positive for cocaine, THC and oxycodone (UDS was collected after receiving oxycodone in ED)  Patient admits to continued cocaine use, reports she had smoked crack/cocaine on Friday 7/11  Educated on risks involved of continued cocaine use, especially in setting of severe PAD  Psychiatry and CM consult, recommendations appreciated for drug rehab resources

## 2025-07-13 NOTE — H&P
H&P - Hospitalist   Name: Tony Roberto 39 y.o. female I MRN: [id number]  Unit/Bed#: -01 I Date of Admission: 7/12/2025   Date of Service: 7/13/2025 I Hospital Day: 0     Assessment & Plan  Diabetic neuropathy (HCC)  Insulin dependent diabetes mellitus (HCC)  Lab Results   Component Value Date    HGBA1C 13.3 (H) 05/26/2025     Presents with acute on chronic arm/leg pains in the setting of known diabetic neuropathy and uncontrolled insulin-dependent diabetes mellitus -> pain regimen on board and will optimize a course of Gabapentin  Noncompliant with home insulin over the last several days due to lack of insulin supplies as patient endorsed to ED provider that her pharmacy no longer covers current regimen  Restart basal insulin with additional SSI coverage per Accu-Cheks -> will appreciate case management assistance in medication affordability options  Carbohydrate restriction  Hypoglycemia protocol  PAD (peripheral artery disease) (HCC)  Status post prior femoral-popliteal bypass and subsequent left AKA  Continue ASA/statin/Xarelto  In regards to RLE pain, recently hospitalized for similar issue just over a month ago at which time patient underwent a boomerang transfer to the [facility] for an angiogram which revealed evidence of multifocal critical stenosis of the right SFA/popliteal vasculature status post SFA/proximal popliteal artery angioplasty and thrombolysis of the tibioperoneal trunk -> follow-up arterial duplex revealed an NIKKI still severe at 0.55 -> due to recurrent presentation with intractable pain, will await vascular surgery consultation  Strongly encourage smoking cessation  Morbid obesity  BMI of 43.90  Lifestyle/diet modifications  Chronic pain of left upper extremity  Recently hospitalized just over a month ago for similar issue -> acute stroke was ruled out last month on hospitalization, and patient evaluated by neurology/orthopedic surgery with recommendation of follow-up  outpatient for neuromuscular evaluation in approximately 4-6 weeks from that time  Optimize blood sugar control and Gabapentin regimen for underlying neuropathy  Essential hypertension  Continue Zestril/Minipress  Low-sodium diet  Tobacco abuse  Cessation counseling  Transdermal nicotine patch on board  History of polysubstance abuse (HCC)  Known history of cocaine and cannabinoid abuse, as recently as May 2025  Check urine drug screen  Prior toxicology screens also positive for opiates, however, patient has received opioid prescriptions for pain in the past  COPD (chronic obstructive pulmonary disease)/asthma  Stable  Continue IC/LABA maintenance regimen - PRN Albuterol on board  Psychiatric disorder  Continue Cymbalta  History of stroke  Continue ASA/Xarelto/statin      VTE Pharmacologic Prophylaxis: VTE Score: 3 Moderate Risk (Score 3-4) - Pharmacological DVT Prophylaxis Ordered: Rivaroxaban (Xarelto).    Code Status: Level 1 - Full Code    Discussion with:  Patient at bedside    Anticipated Length of Stay:  Patient will be admitted on an Observation basis with an anticipated length of stay of less than 2 midnights.   Justification for Hospital Stay: Hand-and-foot pain in the setting of noncompliance diabetes mellitus with neuropathy requiring pain control and establishment of home insulin regimen with case management assistance.    Total Time for Visit (including Counseling & Coordination of Care):  78 minutes. More than 50% of total time spent on counseling and coordination of care, on one or more of the following: performing physical exam; counseling and coordination of care; obtaining or reviewing history; documenting in the medical record; reviewing/ordering tests, medications or procedures; communicating with other healthcare professionals and discussing with patient's family/caregivers.      History of Present Illness    Chief Complaint: Left hand and right foot pain with insulin noncompliance    Tony COLLADO  Pardeep is a 39 y.o. female who presents with complaints of left hand and right foot pain in the setting of known insulin-dependent diabetes mellitus and noncompliance over the last several days.  Of note, she also has a history of PAD status post left AKA.  In the ED she endorsed to the ED provider that she has been noncompliant with her insulin over the last several days due to a lack of insulin supplies at which time she called her pharmacy and was told that some of her medications were no longer covered, including Trulicity.  In the ED, a BMP revealed a blood sugar of 220 with an Accu-Chek later on reading 251.  At the time of my encounter after arrival to the medical floor, she still continues to endorse increased pain on the right lower leg and intermittent pain/numbness of the left arm, similar to prior presentation and hospitalization over a month ago.  At the time she underwent a boomerang transfer to the [facility] for right lower extremity arteriogram with subsequent angioplasty and thrombolysis.  Other than waxing/waning pain, denies other acute complaints at this time.    Review of Systems:  A thorough 12 point review systems was conducted.  Pertinent positives and negatives are mentioned in the history of present illness.      Past Medical & Surgical History    Past Medical History[1]    Past Surgical History[2]      Medications:   Prior to Admission medications    Medication Sig Start Date End Date Taking? Authorizing Provider   Accu-Chek FastClix Lancets MISC Use in the morning and at noon and in the evening. May substitute brand based on insurance coverage. Check glucose ACHS. 7/9/25 8/8/25  Lisa Melecio, DO   acetaminophen (TYLENOL) 500 mg tablet Take 2 tablets (1,000 mg total) by mouth every 8 (eight) hours as needed for mild pain 7/9/25   Lisa Melecio, DO   Advair -21 MCG/ACT inhaler Inhale 2 puffs 2 (two) times a day Rinse mouth after use 7/9/25   Lisa Melecio, DO   albuterol  "(PROVENTIL HFA,VENTOLIN HFA) 90 mcg/act inhaler Inhale 2 puffs every 4 (four) hours as needed for wheezing or shortness of breath 7/9/25   Lisa Melecio, DO   Alcohol Swabs (B-D SINGLE USE SWABS REGULAR) PADS Apply topically in the morning 7/9/25   Lisa Melecio, DO   Alcohol Swabs 70 % PADS May substitute brand based on insurance coverage. Check glucose ACHS. 7/9/25   Lisa Melecio, DO   aspirin 81 mg chewable tablet Chew 1 tablet (81 mg total) daily 7/9/25 8/8/25  Lisa Melecio, DO   atorvastatin (LIPITOR) 80 mg tablet Take 1 tablet (80 mg total) by mouth daily with dinner 7/9/25 8/8/25  Lisa Melecio, DO   Blood Glucose Monitoring Suppl (ONE TOUCH ULTRA 2) w/Device KIT BY DEVICE ROUTE 2 (TWO) TIMES A DAY CHECK BLOOD SUGARS AND RECORD VALUE AND TIME OF DAY TWICE A DAY 10/12/23   Mati Prabhakar MD   Blood Glucose Monitoring Suppl (OneTouch Verio Reflect) w/Device KIT May substitute brand based on insurance coverage. Check glucose ACHS. 6/2/25   Shanthi Lane PA-C   Blood Pressure Monitoring (Blood Pressure Monitor Automat) KATLYN Use Daily as Directed 11/14/23   Mati Prabhakar MD   cyclobenzaprine (FLEXERIL) 10 mg tablet Take 1 tablet (10 mg total) by mouth 3 (three) times a day as needed for muscle spasms 7/9/25   Lisa Melecio, DO   DULoxetine (CYMBALTA) 30 mg delayed release capsule Take 2 capsules (60 mg total) by mouth daily 7/9/25 8/8/25  Lisa Melecio, DO   Global Inject Ease Lancets 30G MISC Use 3 (three) times a day 7/9/25   Lisa Melecio, DO   glucose blood (OneTouch Verio) test strip May substitute brand based on insurance coverage. Check glucose ACHS. 7/9/25   Lisa Melecio, DO   glycerin-hypromellose- (ARTIFICIAL TEARS) 0.2-0.2-1 % SOLN Administer 2 drops to both eyes every 6 (six) hours as needed (for eye discomfort) 7/9/25   Lisa Melecio, DO   INS SYRINGE/NEEDLE .5CC/28G (B-D INS SYR MICROFINE .5CC/28G) 28G X 1/2\" 0.5 ML MISC Use every 12 (twelve) hours Use as directed to inject tresiba Q12H 7/9/25   Lisa " "Melecio, DO   insulin degludec (Tresiba FlexTouch) 100 units/mL injection pen Inject 35 Units under the skin every 12 (twelve) hours 7/9/25 10/7/25  Lisa Majano, DO   Insulin Pen Needle (BD Pen Needle Leann 2nd Gen) 32G X 4 MM MISC For use with insulin pen. Pharmacy may dispense brand covered by insurance. 7/9/25   Lisa Majano, DO   Insulin Pen Needle (BD Pen Needle Leann 2nd Gen) 32G X 4 MM MISC Inject into a muscle in the morning and at noon and in the evening and before bedtime. 7/9/25 8/8/25  Lisa Majano, DO   Insulin Pen Needle (Comfort EZ Pen Needles) 33G X 4 MM MISC Inject into a muscle in the morning and at noon and in the evening. Use as directed to administer insulin. 7/9/25 8/8/25  Lisa Majano, DO   Ketotifen Fumarate (ZADITOR) 0.035 % ophthalmic solution Administer 1 drop to both eyes 2 (two) times a day as needed (for eye discomfort) 7/9/25 8/28/25  Lisa Majano, DO   Lantus 100 UNIT/ML subcutaneous injection Inject 30 Units under the skin every 12 (twelve) hours 7/9/25 8/28/25  Lisa Majano, DO   lisinopril (ZESTRIL) 20 mg tablet Take 1 tablet (20 mg total) by mouth daily 7/9/25 8/8/25  Lisa Majano, DO   magnesium Oxide (MAG-OX) 400 mg TABS Take 1 tablet (400 mg total) by mouth 2 (two) times a day 7/9/25 8/8/25  Lisa Ulload, DO   metFORMIN (GLUCOPHAGE) 1000 MG tablet Take 1 tablet (1,000 mg total) by mouth 2 (two) times a day with meals 7/9/25   Lisa Ulload, DO   metFORMIN (GLUCOPHAGE) 1000 MG tablet Take 1 tablet (1,000 mg total) by mouth 2 (two) times a day with meals 7/9/25   Lisa Majano, DO   nicotine (NICODERM CQ) 21 mg/24 hr TD 24 hr patch Place 1 patch on the skin daily over 24 hours 7/9/25   Lisa Melecio, DO   prazosin (MINIPRESS) 1 mg capsule Take 1 capsule (1 mg total) by mouth daily at bedtime 7/9/25 8/8/25  Lisa Majano DO   rivaroxaban (Xarelto) 20 mg tablet Take 1 tablet (20 mg total) by mouth daily with breakfast 7/9/25   Lisa Majano DO   Sure Comfort Insulin Syringe 31G X 5/16\" 0.3 ML MISC " "in the morning and in the evening. Use as directed. 11/9/23   Historical Provider, MD   Trulicity 1.5 MG/0.5ML SOAJ Inject 0.75 mg under the skin every 7 days 7/9/25 8/8/25  Lisa Majano DO       Allergies: Allergies[3]      Social & Family History    Social History     Substance and Sexual Activity   Alcohol Use Not Currently    Comment: not currently     Tobacco Use History[4]  Social History     Substance and Sexual Activity   Drug Use Not Currently    Types: \"Crack\" cocaine, Marijuana    Comment: 1 years clean from crack cocaine since 2022       Family History[5]      Objective     Vitals:   Blood Pressure: 145/90 (07/13/25 0134)  Pulse: 79 (07/13/25 0134)  Temperature: 98.5 °F (36.9 °C) (07/13/25 0134)  Temp Source: Oral (07/13/25 0134)  Respirations: 20 (07/13/25 0134)  Height: 5' 1\" (154.9 cm) (07/13/25 0134)  Weight - Scale: 105 kg (232 lb 5.8 oz) (07/13/25 0134)  SpO2: 100 % (07/13/25 0134)      Physical Exam:    GENERAL Obese - waxing/waning distress from pain   HEAD   Normocephalic - atraumatic   EYES Nonicteric   MOUTH   Mucosa moist   NECK   Supple - full range of motion   CARDIAC Rate controlled   PULMONARY   Clear breath sounds bilaterally - nonlabored respirations   ABDOMEN Nontender/nondistended   MUSCULOSKELETAL   Motor strength/range of motion deconditioned - s/p left AKA with healed stump   NEUROLOGIC   Alert/oriented at baseline   PSYCHIATRIC   Mood/affect mildly anxious         Labs & Recent Cultures:  Results from last 7 days   Lab Units 07/12/25  2332   WBC Thousand/uL 9.65   HEMOGLOBIN g/dL 12.6   HEMATOCRIT % 35.6   PLATELETS Thousands/uL 358   SEGS PCT % 56   LYMPHO PCT % 34   MONO PCT % 6   EOS PCT % 4     Results from last 7 days   Lab Units 07/12/25  2332   SODIUM mmol/L 135   POTASSIUM mmol/L 3.5   CHLORIDE mmol/L 104   CO2 mmol/L 24   BUN mg/dL 22   CREATININE mg/dL 0.50*   ANION GAP mmol/L 7   CALCIUM mg/dL 8.8   ALBUMIN g/dL 3.5   TOTAL BILIRUBIN mg/dL 0.35   ALK PHOS U/L 91   ALT " U/L 14   AST U/L 10*   GLUCOSE RANDOM mg/dL 220*         Results from last 7 days   Lab Units 25  0133 25  2300   POC GLUCOSE mg/dl 251* 238*                         Lines/Drains:  Invasive Devices       Peripheral Intravenous Line  Duration             Peripheral IV 25 Left Antecubital <1 day                            ISA TERAN MD   Hospitalist - [facility] Internal [facility]        ** Please Note:  Documentation is constructed using a voice recognition dictation system.  An occasional wrong word/phrase or “sound-a-like” substitution may have been picked up by dictation device due to the inherent limitations of voice recognition software.  Read the chart carefully and recognize, using reasonable context, where substitutions may have occurred.**          [1]   Past Medical History:  Diagnosis Date    Asthma     Atherosclerosis     Bipolar 1 disorder (HCC)     COPD (chronic obstructive pulmonary disease) (HCC)     Depression     Diabetes mellitus (HCC)     Hypertension     Psychiatric disorder     cutting history    PTSD (post-traumatic stress disorder)     Schizoaffective disorder (HCC)     Tendonitis    [2]   Past Surgical History:  Procedure Laterality Date    AMPUTATION ABOVE KNEE (AKA) Left 3/29/2023    Procedure: AMPUTATION ABOVE KNEE (AKA);  Surgeon: Savi Langford MD;  Location: [facility];  Service: Vascular    BYPASS FEMORAL-POPLITEAL Left 2021    Procedure: Left Common Femoral Below Knee to Popliteal Bypass with Insitu GSV graft.  Left Lower Extremity Angiogram;  Surgeon: Savi Langford MD;  Location: [facility];  Service: Vascular     SECTION      EAR SURGERY      FL LUMBAR PUNCTURE DIAGNOSTIC  10/25/2018    IR LOWER EXTREMITY ANGIOGRAM  2021    IR LOWER EXTREMITY ANGIOGRAM  2021    IR LOWER EXTREMITY ANGIOGRAM  3/24/2023    IR LOWER EXTREMITY ANGIOGRAM  2025    MT SLCTV CATHJ 3RD+ ORD SLCTV ABDL PEL/LXTR BRNCH Left 3/24/2023    Procedure: Left leg angiogram;   Surgeon: Byron Wahl DO;  Location: [facility];  Service: Vascular    TUBAL LIGATION     [3] No Known Allergies  [4]   Social History  Tobacco Use   Smoking Status Every Day    Current packs/day: 1.00    Average packs/day: 1 pack/day for 22.1 years (22.1 ttl pk-yrs)    Types: Cigarettes    Start date: 3/24/2003    Last attempt to quit: 3/24/2023    Passive exposure: Current   Smokeless Tobacco Former    Quit date: 4/29/2022   [5]   Family History  Problem Relation Name Age of Onset    Hypertension Mother      Diabetes Mother      HIV Mother      Heart disease Mother      No Known Problems Father

## 2025-07-14 ENCOUNTER — PATIENT OUTREACH (OUTPATIENT)
Dept: CASE MANAGEMENT | Facility: OTHER | Age: 40
End: 2025-07-14

## 2025-07-14 ENCOUNTER — TELEPHONE (OUTPATIENT)
Dept: PSYCHIATRY | Facility: CLINIC | Age: 40
End: 2025-07-14

## 2025-07-14 ENCOUNTER — APPOINTMENT (INPATIENT)
Dept: VASCULAR ULTRASOUND | Facility: HOSPITAL | Age: 40
DRG: 300 | End: 2025-07-14
Payer: COMMERCIAL

## 2025-07-14 LAB
ANION GAP SERPL CALCULATED.3IONS-SCNC: 10 MMOL/L (ref 4–13)
APTT PPP: 35 SECONDS (ref 23–34)
APTT PPP: 45 SECONDS (ref 23–34)
BUN SERPL-MCNC: 18 MG/DL (ref 5–25)
CALCIUM SERPL-MCNC: 8.3 MG/DL (ref 8.4–10.2)
CHLORIDE SERPL-SCNC: 104 MMOL/L (ref 96–108)
CO2 SERPL-SCNC: 21 MMOL/L (ref 21–32)
CREAT SERPL-MCNC: 0.5 MG/DL (ref 0.6–1.3)
ERYTHROCYTE [DISTWIDTH] IN BLOOD BY AUTOMATED COUNT: 14.9 % (ref 11.6–15.1)
EST. AVERAGE GLUCOSE BLD GHB EST-MCNC: 235 MG/DL
GFR SERPL CREATININE-BSD FRML MDRD: 123 ML/MIN/1.73SQ M
GLUCOSE SERPL-MCNC: 177 MG/DL (ref 65–140)
GLUCOSE SERPL-MCNC: 222 MG/DL (ref 65–140)
GLUCOSE SERPL-MCNC: 239 MG/DL (ref 65–140)
GLUCOSE SERPL-MCNC: 247 MG/DL (ref 65–140)
GLUCOSE SERPL-MCNC: 256 MG/DL (ref 65–140)
GLUCOSE SERPL-MCNC: 303 MG/DL (ref 65–140)
HBA1C MFR BLD: 9.8 %
HCT VFR BLD AUTO: 33.6 % (ref 34.8–46.1)
HGB BLD-MCNC: 11.5 G/DL (ref 11.5–15.4)
INR PPP: 1.08 (ref 0.85–1.19)
MAGNESIUM SERPL-MCNC: 2.1 MG/DL (ref 1.9–2.7)
MCH RBC QN AUTO: 25.8 PG (ref 26.8–34.3)
MCHC RBC AUTO-ENTMCNC: 34.2 G/DL (ref 31.4–37.4)
MCV RBC AUTO: 76 FL (ref 82–98)
PLATELET # BLD AUTO: 271 THOUSANDS/UL (ref 149–390)
PMV BLD AUTO: 10 FL (ref 8.9–12.7)
POTASSIUM SERPL-SCNC: 4.3 MMOL/L (ref 3.5–5.3)
PROTHROMBIN TIME: 14.4 SECONDS (ref 12.3–15)
RBC # BLD AUTO: 4.45 MILLION/UL (ref 3.81–5.12)
SODIUM SERPL-SCNC: 135 MMOL/L (ref 135–147)
WBC # BLD AUTO: 7.59 THOUSAND/UL (ref 4.31–10.16)

## 2025-07-14 PROCEDURE — G0426 INPT/ED TELECONSULT50: HCPCS | Performed by: STUDENT IN AN ORGANIZED HEALTH CARE EDUCATION/TRAINING PROGRAM

## 2025-07-14 PROCEDURE — 80048 BASIC METABOLIC PNL TOTAL CA: CPT | Performed by: PHYSICIAN ASSISTANT

## 2025-07-14 PROCEDURE — 93922 UPR/L XTREMITY ART 2 LEVELS: CPT | Performed by: SURGERY

## 2025-07-14 PROCEDURE — 93926 LOWER EXTREMITY STUDY: CPT

## 2025-07-14 PROCEDURE — 85730 THROMBOPLASTIN TIME PARTIAL: CPT | Performed by: INTERNAL MEDICINE

## 2025-07-14 PROCEDURE — 99223 1ST HOSP IP/OBS HIGH 75: CPT

## 2025-07-14 PROCEDURE — 85027 COMPLETE CBC AUTOMATED: CPT | Performed by: PHYSICIAN ASSISTANT

## 2025-07-14 PROCEDURE — 85610 PROTHROMBIN TIME: CPT | Performed by: PHYSICIAN ASSISTANT

## 2025-07-14 PROCEDURE — 99232 SBSQ HOSP IP/OBS MODERATE 35: CPT | Performed by: PHYSICIAN ASSISTANT

## 2025-07-14 PROCEDURE — 93926 LOWER EXTREMITY STUDY: CPT | Performed by: SURGERY

## 2025-07-14 PROCEDURE — 83735 ASSAY OF MAGNESIUM: CPT | Performed by: PHYSICIAN ASSISTANT

## 2025-07-14 PROCEDURE — 82948 REAGENT STRIP/BLOOD GLUCOSE: CPT

## 2025-07-14 RX ORDER — MORPHINE SULFATE 15 MG/1
15 TABLET ORAL EVERY 4 HOURS PRN
Refills: 0 | Status: DISCONTINUED | OUTPATIENT
Start: 2025-07-14 | End: 2025-07-15 | Stop reason: HOSPADM

## 2025-07-14 RX ORDER — HEPARIN SODIUM 1000 [USP'U]/ML
2000 INJECTION, SOLUTION INTRAVENOUS; SUBCUTANEOUS EVERY 6 HOURS PRN
Status: DISCONTINUED | OUTPATIENT
Start: 2025-07-14 | End: 2025-07-15 | Stop reason: HOSPADM

## 2025-07-14 RX ORDER — HEPARIN SODIUM 1000 [USP'U]/ML
4000 INJECTION, SOLUTION INTRAVENOUS; SUBCUTANEOUS EVERY 6 HOURS PRN
Status: DISCONTINUED | OUTPATIENT
Start: 2025-07-14 | End: 2025-07-15 | Stop reason: HOSPADM

## 2025-07-14 RX ORDER — ARIPIPRAZOLE 5 MG/1
5 TABLET ORAL DAILY
Status: DISCONTINUED | OUTPATIENT
Start: 2025-07-15 | End: 2025-07-15 | Stop reason: HOSPADM

## 2025-07-14 RX ORDER — HEPARIN SODIUM 10000 [USP'U]/100ML
3-20 INJECTION, SOLUTION INTRAVENOUS
Status: DISCONTINUED | OUTPATIENT
Start: 2025-07-14 | End: 2025-07-15 | Stop reason: HOSPADM

## 2025-07-14 RX ORDER — INSULIN GLARGINE 100 [IU]/ML
38 INJECTION, SOLUTION SUBCUTANEOUS EVERY 12 HOURS SCHEDULED
Status: DISCONTINUED | OUTPATIENT
Start: 2025-07-14 | End: 2025-07-15 | Stop reason: HOSPADM

## 2025-07-14 RX ORDER — MORPHINE SULFATE 4 MG/ML
4 INJECTION, SOLUTION INTRAMUSCULAR; INTRAVENOUS EVERY 4 HOURS PRN
Status: DISCONTINUED | OUTPATIENT
Start: 2025-07-14 | End: 2025-07-15 | Stop reason: HOSPADM

## 2025-07-14 RX ADMIN — GABAPENTIN 600 MG: 300 CAPSULE ORAL at 16:45

## 2025-07-14 RX ADMIN — ACETAMINOPHEN 975 MG: 325 TABLET, FILM COATED ORAL at 21:04

## 2025-07-14 RX ADMIN — DULOXETINE HYDROCHLORIDE 60 MG: 60 CAPSULE, DELAYED RELEASE ORAL at 08:25

## 2025-07-14 RX ADMIN — Medication 400 MG: at 08:30

## 2025-07-14 RX ADMIN — GABAPENTIN 600 MG: 300 CAPSULE ORAL at 21:03

## 2025-07-14 RX ADMIN — INSULIN LISPRO 3 UNITS: 100 INJECTION, SOLUTION INTRAVENOUS; SUBCUTANEOUS at 16:45

## 2025-07-14 RX ADMIN — NICOTINE 1 PATCH: 21 PATCH, EXTENDED RELEASE TRANSDERMAL at 08:22

## 2025-07-14 RX ADMIN — HEPARIN SODIUM 2000 UNITS: 1000 INJECTION INTRAVENOUS; SUBCUTANEOUS at 23:23

## 2025-07-14 RX ADMIN — INSULIN GLARGINE 38 UNITS: 100 INJECTION, SOLUTION SUBCUTANEOUS at 21:04

## 2025-07-14 RX ADMIN — INSULIN GLARGINE 35 UNITS: 100 INJECTION, SOLUTION SUBCUTANEOUS at 06:07

## 2025-07-14 RX ADMIN — LISINOPRIL 20 MG: 20 TABLET ORAL at 08:23

## 2025-07-14 RX ADMIN — MORPHINE SULFATE 2 MG: 2 INJECTION, SOLUTION INTRAMUSCULAR; INTRAVENOUS at 21:04

## 2025-07-14 RX ADMIN — CYCLOBENZAPRINE 10 MG: 10 TABLET, FILM COATED ORAL at 21:04

## 2025-07-14 RX ADMIN — MORPHINE SULFATE 15 MG: 15 TABLET ORAL at 13:46

## 2025-07-14 RX ADMIN — CYCLOBENZAPRINE 10 MG: 10 TABLET, FILM COATED ORAL at 16:45

## 2025-07-14 RX ADMIN — CYCLOBENZAPRINE 10 MG: 10 TABLET, FILM COATED ORAL at 08:24

## 2025-07-14 RX ADMIN — MORPHINE SULFATE 2 MG: 2 INJECTION, SOLUTION INTRAMUSCULAR; INTRAVENOUS at 16:51

## 2025-07-14 RX ADMIN — PRAZOSIN HYDROCHLORIDE 1 MG: 1 CAPSULE ORAL at 21:03

## 2025-07-14 RX ADMIN — ATORVASTATIN CALCIUM 80 MG: 40 TABLET, FILM COATED ORAL at 16:45

## 2025-07-14 RX ADMIN — ASPIRIN 81 MG 81 MG: 81 TABLET ORAL at 08:23

## 2025-07-14 RX ADMIN — INSULIN LISPRO 3 UNITS: 100 INJECTION, SOLUTION INTRAVENOUS; SUBCUTANEOUS at 08:15

## 2025-07-14 RX ADMIN — GABAPENTIN 600 MG: 300 CAPSULE ORAL at 08:23

## 2025-07-14 RX ADMIN — Medication 400 MG: at 18:05

## 2025-07-14 RX ADMIN — RIVAROXABAN 20 MG: 20 TABLET, FILM COATED ORAL at 08:25

## 2025-07-14 RX ADMIN — INSULIN LISPRO 2 UNITS: 100 INJECTION, SOLUTION INTRAVENOUS; SUBCUTANEOUS at 11:53

## 2025-07-14 RX ADMIN — ACETAMINOPHEN 975 MG: 325 TABLET, FILM COATED ORAL at 13:48

## 2025-07-14 RX ADMIN — ACETAMINOPHEN 975 MG: 325 TABLET, FILM COATED ORAL at 06:07

## 2025-07-14 RX ADMIN — INSULIN LISPRO 4 UNITS: 100 INJECTION, SOLUTION INTRAVENOUS; SUBCUTANEOUS at 21:04

## 2025-07-14 RX ADMIN — HEPARIN SODIUM 11.1 UNITS/KG/HR: 10000 INJECTION, SOLUTION INTRAVENOUS at 16:27

## 2025-07-14 RX ADMIN — MORPHINE SULFATE 15 MG: 15 TABLET ORAL at 00:55

## 2025-07-14 NOTE — ASSESSMENT & PLAN NOTE
39 y.o. female with PMH of tobacco use, nonambulatory, with obesity, COPD, HTN, bipolar 1, PAD with hx LLE CLTI s/p left fem-BK pop bypass which subsequently occluded. S/p L AKA for nonsalvageable LLE 3/29/23. She was recently admitted in May for RLE wounds/pain, she underwent RLE angiogram 5/30 with multifocal critical stenosis in SFA and pop. 3V runoff with small vessel disease in the foot and incomplete pedal plantar arch. DCB of the entire SFA and P1 segment of the popliteal artery. Small amount of distal embolization noted into the peroneal and distal PT, treated with tPA. Post intervention NIKKI 6/2 demonstrated no significant improvement in NIKKI, however toe pressures above healing range for a diabetic. She is now admitted for RLE pain. LEAD ordered for further evaluation. Vascular surgery was consulted for recommendations.     Imaging-   LEAD 7/14- Occluded right proximal, mid, and distal SFA with reconstitution distally in proximal popliteal artery. R NIKKI 0.55/49/23.     Labs-  WBC 7.59  Hgb 11.5  sCr 0.50/  INR 1.08    Plan-  -CLTI with RLE rest pain. Hx of L AKA.   -EZEKIEL demonstrates re-occlusion of her entire SFA with reconstitution in the popliteal artery.   -Recommend RLE angiogram for treatment of her SFA occlusion. IR consulted. Timing/location TBD.  -Will order vein mapping for possible bypass planning, however patient would be high risk for open revascularization given uncontrolled DM with A1c of 13%.   -Will need to hold xarelto for 48 hours prior to vascular intervention. SLIM aware. Last dose of Xarelto was 7/14 AM.   -Patient is high risk for more proximal amputation.   -Continue aspirin and statin.   -Pain control per SLIM.   -Smoking cessation.   -D/w CLIVE, IR.   -D/w Dr. Wahl.

## 2025-07-14 NOTE — ASSESSMENT & PLAN NOTE
Lab Results   Component Value Date    HGBA1C 13.3 (H) 05/26/2025       Recent Labs     07/13/25  1621 07/13/25  2101 07/14/25  0606 07/14/25  0706   POCGLU 265* 282* 247* 256*       Blood Sugar Average: Last 72 hrs:  (P) 267.875

## 2025-07-14 NOTE — PLAN OF CARE
Patient is cooperative with blood sugar checks and the administration of insulin. She needs further encouragement in managing her diet which also plays a part in maintaining her glucose with target range.

## 2025-07-14 NOTE — CASE MANAGEMENT
Case Management Assessment    Patient name Tony Roberto  Location /-01 MRN [id number]  : 1985 Date 2025       Current Admission Date: 2025  Current Admission Diagnosis:Diabetic neuropathy ([facility])   Patient Active Problem List    Diagnosis Date Noted    Diabetic neuropathy (HCC) 2025    Insulin dependent diabetes mellitus (HCC) 2025    History of polysubstance abuse (HCC) 2025    Psychiatric disorder 2025    History of stroke 2025    Chronic pain of left upper extremity 2025    Wound of right foot 2025    History of CVA (cerebrovascular accident) 2025    Left hand pain 2025    Hyponatremia 2025    History of substance use 2025    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, left leg ([facility]) 2024    Tobacco abuse 2024    Iron deficiency anemia 2023    Urinary incontinence 2023    Constipation 2023    Status post fall 2023    Pyuria 2023    Postoperative blood loss anemia 2023    Leukocytosis 2023    Class 3 severe obesity due to excess calories without serious comorbidity with body mass index (BMI) of 45.0 to 49.9 in adult 2022    Positive D dimer 10/02/2021    S/P AKA (above knee amputation) unilateral, left ([facility]) 2021    Bursitis of left knee 2021    Superficial femoral artery occlusion ([facility]) 2021    Morbid obesity 2021    PAD (peripheral artery disease) ([facility]) 2021    Diarrhea 2021    Bilateral leg pain 2021    Leg pain 2020    Paresthesia 2020    COPD (chronic obstructive pulmonary disease)/asthma 2020    Nicotine dependence 2020    Syncope 2020    Hypertensive urgency 2020    Hydradenitis 2019    Chronic bilateral low back pain without sciatica 2018    Bipolar disorder 2018    Cocaine use 10/24/2018    Mixed  hyperlipidemia 10/24/2018    Diplopia 10/24/2018    Essential hypertension 10/22/2018    Obstructive sleep apnea 08/30/2018    Trichomoniasis 08/30/2018    Type 2 diabetes mellitus with diabetic polyneuropathy, with long-term current use of insulin ([facility]) 07/18/2018    Suicide attempt ([facility]) 07/18/2018    Uncontrolled type 2 diabetes mellitus with diabetic polyneuropathy, without long-term current use of insulin 07/18/2018    Moderate persistent asthma without complication 04/10/2018    Diabetic autonomic neuropathy associated with type 2 diabetes mellitus ([facility]) 04/10/2018      LOS (days): 1  Geometric Mean LOS (GMLOS) (days): 3.1  Days to GMLOS:2.3     OBJECTIVE:    Risk of Unplanned Readmission Score: 32.84     Current admission status: Inpatient     Preferred Pharmacy:   [facility]  [place], [place] - [address]  [address]  [facility] [address]  Phone: [phone] Fax: [phone]    [facility], [place], [place] - [address]  [address] [facility] [address]  Phone: [phone] Fax: [phone]    Primary Care Provider: Stevan Orellana MD    Primary Insurance: [employer]  Secondary Insurance:     ASSESSMENT:  Active Health Care Proxies    There are no active Health Care Proxies on file.    Advance Directives  Does patient have a Health Care POA?: No  Does patient have Advance Directives?: No    Readmission Root Cause  30 Day Readmission: No    Patient Information  Admitted from:: Home  Mental Status: Alert  During Assessment patient was accompanied by: Spouse  Assessment information provided by:: Patient, Spouse  Primary Caregiver: Self  Support Systems: Spouse/significant other  County of Residence: [place]  What city do you live in?: [place]  Home entry access options. Select all that apply.: No steps to enter home  Type of Current Residence: Apartment  Floor Level: 1  Upon entering residence, is there a bedroom on the main floor (no further  "steps)?: Yes  Upon entering residence, is there a bathroom on the main floor (no further steps)?: Yes  Living Arrangements: Lives w/ Spouse/significant other  Is patient a ?: No    Activities of Daily Living Prior to Admission  Functional Status: Independent  Completes ADLs independently?: Yes  Ambulates independently?: Yes  Does patient use assisted devices?: Yes  Assisted Devices (DME) used: Wheelchair  Does patient currently own DME?: Yes  What DME does the patient currently own?: Wheelchair  Does patient have a history of Outpatient Therapy (PT/OT)?: Yes  Does the patient have a history of Short-Term Rehab?: Yes  Does patient have a history of HHC?: Yes  Does patient currently have HHC?: No    Patient Information Continued  Income Source: SSI/SSD  Does patient have prescription coverage?: Yes  Can the patient afford their medications and any related supplies (such as glucometers or test strips)?: Yes  Does patient receive dialysis treatments?: No  Does patient have a history of substance abuse?: Yes  Historical substance use preference: \"Crack\" cocaine  History of Withdrawal Symptoms: Denies past symptoms  Is patient currently in treatment for substance abuse?: Patient provided treatment options.  Does patient have a history of Mental Health Diagnosis?: Yes (Bipolar)  Is patient receiving treatment for mental health?: Yes  Has patient received inpatient treatment related to mental health in the last 2 years?: No    Means of Transportation  Means [facility] Transport to [facility]:: Family transport    The patient currently lives with her wife at home in an apartment. The patient was recently referred to OP RN CM due to SDOH concerns. Per chart review, the patient's medications on her last discharge were $0 copay. CM will do a price check on discharge meds prior to d/c. Referral made to [facility] for D&A resources. CM will continue to follow.  "

## 2025-07-14 NOTE — CASE MANAGEMENT
SURJIT met with the patient bedside. SURJIT liaison will continue to work with the patient on resources.

## 2025-07-14 NOTE — UTILIZATION REVIEW
NOTIFICATION OF INPATIENT ADMISSION   AUTHORIZATION REQUEST   SERVICING FACILITY:   [place] [address]  Tax ID: [id number]  NPI: [id number] ATTENDING PROVIDER:  Attending Name and NPI#: Adina Monsivais Md [[id number]]  Address: [address] [place] [address]  Phone:  [phone]     ADMISSION INFORMATION:  Place of Service: Inpatient Acute [facility]  Place of Service Code: 21  Inpatient Admission Date/Time: 7/13/25  3:17 PM  Discharge Date/Time: No discharge date for patient encounter.  Admitting Diagnosis Code/Description:  Diabetic neuropathy (HCC) [E11.40]  Hand pain [M79.643]  Left hand pain [M79.642]  Right foot pain [M79.671]  Diabetes mellitus type 2, insulin dependent (HCC) [E11.9, Z79.4]  Difficulty refilling prescriptions [Z78.9]     UTILIZATION REVIEW CONTACT:  Bettie Phan, Utilization   Network Utilization Review Department  Phone: [phone]  Fax: [phone]  Email: [email or web address]  Contact for approvals/pending authorizations, clinical reviews, and discharge.     PHYSICIAN ADVISORY SERVICES:  Medical Necessity Denial & Vxmd-cw-Lokn Review  Phone: [phone]  Fax: [phone]  Email: [email or web address]     DISCHARGE SUPPORT TEAM:  For Patients Discharge Needs & Updates  Phone: [phone] opt. 2 Fax: [phone]  Email: [email or web address]
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
## 2025-07-14 NOTE — PROGRESS NOTES
Received ADT alert patient was admitted to [facility] with diabetic neuropathy.  Will outreach patient upon discharge.

## 2025-07-14 NOTE — PROGRESS NOTES
Progress Note - Hospitalist   Name: Tony Roberto 39 y.o. female I MRN: [id number]  Unit/Bed#: -01 I Date of Admission: 7/12/2025   Date of Service: 7/14/2025 I Hospital Day: 1    Assessment & Plan  PAD (peripheral artery disease) with right lower extremity pain  Patient presented with worsening RLE pain, most prominently along sole of right foot. Hx extensive PAD in bilateral LE, s/p left AKA for nonsalvageable LLE in 2023 due to occluded prior left fem-BK pop bypass.   Recent RLE angiogram with IR (5/30/25): Multifocal critical stenosis involving entirety of the right SFA and popliteal, s/p angioplasty & catheterization of the tibioperoneal trunk with thrombolytic.   Arterial duplex RLE: High grade stenosis versus occlusion in the proximal, mid and distal superficial femoral artery with reconstitution distally in the proximal popliteal artery.  NIKKI severe at 0.55, great toe pressures worse at 23 mmHg compared to prior 57 mmHg.  Hold Xarelto, last dose this a.m. on 7/14 & will switch to heparin GTT starting a.m. of 7/15 given hx CVA  Vascular surgery & IR consulted, patient will need RLE angiogram later this week possibly as boomerang vs transfer pending IR availability  Resume PTA aspirin 81 mg daily, atorvastatin 80 mg daily  Strongly encourage smoking cessation, educated on importance of medication compliance  Diabetic neuropathy (HCC)  Lab Results   Component Value Date    HGBA1C 9.8 (H) 07/12/2025     Patient presented with acute on chronic RLE pain as well as left hand pain. Known hx of neuropathy due to uncontrolled T2DM, patient notes noncompliance with insulin in the past due to difficulty with affordability and impaired mobility.  Likely due to diabetic neuropathy with underlying psychosocial and substance abuse issues contributing  Recent admission 5/26-6/2/25 for left hand pain suspected to be due to peripheral diabetic neuropathy  Patient was restarted on gabapentin 300 mg TID during recent  admission  Scheduled Tylenol 975 q8hrs, gabapentin 600 mg TID, Flexeril 10 mg TID; PRN PO morphine for moderate & increased IV morphine for severe/breakthrough pain   Likely will need to establish care with pain management specialist upon discharge  Insulin dependent diabetes mellitus (HCC)  Lab Results   Component Value Date    HGBA1C 9.8 (H) 07/12/2025   History of uncontrolled T2DM, patient notes noncompliance with insulin due to difficulty with affordability in the past.   Patient reported to ED that she was told her Lantus & Trulicity were no longer covered at her pharmacy, however patient later reported that her prescriptions were scheduled to be delivered to her home on 7/10 but she never received and ran out on Friday 7/11.  Recently seen by PCP on 7/9, was prescribed Lantus 30U BID instead of prior Tresiba 35U BID  Home regimen: Lantus 30U BID, Trulicity 0.75 mg weekly injections, metformin 1000 mg BID.  Hold home Trulicity and metformin while inpatient  Increased Lantus 30->35U BID with ongoing hyperglycemia  SSI coverage with Accu-Cheks ACHS  Carb controlled diet, hypoglycemia protocol  History of polysubstance abuse (HCC)  Known history of cocaine and cannabinoid abuse, noted to have positive UDS during admission in May 2025  UDS this admission positive for cocaine, THC and oxycodone (UDS was collected after receiving oxycodone in ED)  Patient admits to continued cocaine use, reports she had smoked crack/cocaine on Friday 7/11  Educated on risks involved of continued cocaine use, especially in setting of severe PAD  Psychiatry and CM consult, recommendations appreciated for drug rehab resources  Psychiatric disorder  Suspect that patient's underlying psychiatric/bipolar disorder, as well as impaired mobility s/p left AKA with morbid obesity, polysubstance use & housing/financial insecurity are largely contributing to patient's ongoing noncompliance/poor control of complex medical comorbidities  Patient  reported worsening depression, denies SI/HI   Consult psychiatry, resume PTA Cymbalta for now  History of stroke  Known history of prior strokes, evidence of chronic infarcts noted on CT imaging in 2024  Recent MRI brain showed no acute infarction though multifocal chronic infarctions  Resume PTA aspirin, atorvastatin  Hold Xarelto, switch to heparin GTT for angiogram  Morbid obesity  BMI of 43.90, affects all aspects of care  Obesity likely contributing to poor mobility  Recommend dietary/lifestyle modifications  Essential hypertension  BP reviewed, currently acceptable  Continue PTA lisinopril, prazosin  Tobacco abuse  Nicotine patches, smoking cessation counseling  COPD (chronic obstructive pulmonary disease)/asthma  Not in acute exacerbation, currently stable on room air  Continue PTA daily maintenance inhaler, PRN albuterol    VTE Pharmacologic Prophylaxis: VTE Score: 3 Moderate Risk (Score 3-4) - Pharmacological DVT Prophylaxis Ordered: rivaroxaban (Xarelto).    Mobility:   Basic Mobility Inpatient Raw Score: 19  JH-HLM Goal: 6: Walk 10 steps or more  JH-HLM Achieved: 4: Move to chair/commode  JH-HLM Goal NOT achieved. Continue with multidisciplinary rounding and encourage appropriate mobility to improve upon JH-HLM goals.    Patient Centered Rounds: I performed bedside rounds with nursing staff today.   Discussions with Specialists or Other Care Team Provider: Vascular surgery, IR, case management    Education and Discussions with Family / Patient: Updated  (wife) at bedside.    Current Length of Stay: 1 day(s)  Current Patient Status: Inpatient   Certification Statement: The patient will continue to require additional inpatient hospital stay due to angiogram, psychiatry evaluation, pain management, safe discharge planning  Discharge Plan: Anticipate discharge in >72 hrs to home.    Code Status: Level 1 - Full Code    Subjective   Patient is seen at bedside this a.m., reports ongoing severe pain  in right foot/lower leg only slightly relieved by IV morphine. Explained to patient arterial duplex results, expressed understanding of need for repeat angiogram and agreeable with transfer to [place] if needed.    Objective :  Temp:  [97.9 °F (36.6 °C)-98.6 °F (37 °C)] 98.2 °F (36.8 °C)  HR:  [86-97] 88  BP: (130-148)/(60-96) 146/79  Resp:  [20] 20  SpO2:  [97 %-98 %] 98 %  O2 Device: None (Room air)    Body mass index is 43.9 kg/m².     Input and Output Summary (last 24 hours):     Intake/Output Summary (Last 24 hours) at 7/14/2025 1606  Last data filed at 7/14/2025 0615  Gross per 24 hour   Intake 490 ml   Output 1200 ml   Net -710 ml       Physical Exam  Constitutional:       General: She is not in acute distress.     Appearance: She is obese. She is not ill-appearing, toxic-appearing or diaphoretic.     Cardiovascular:      Rate and Rhythm: Normal rate and regular rhythm.      Pulses: Normal pulses.      Heart sounds: Normal heart sounds.   Pulmonary:      Effort: Pulmonary effort is normal. No respiratory distress.      Breath sounds: Normal breath sounds.   Abdominal:      General: There is no distension.      Palpations: Abdomen is soft.      Tenderness: There is no abdominal tenderness.     Musculoskeletal:         General: Tenderness present. No swelling.      Comments: Significant hypersensitivity/tenderness to palpation of foot, particularly sole of foot.      Skin:     General: Skin is warm.     Neurological:      General: No focal deficit present.      Mental Status: She is alert.     Psychiatric:         Behavior: Behavior normal.      Comments: Tearful, frustrated           Lines/Drains:              Lab Results: I have reviewed the following results:   Results from last 7 days   Lab Units 07/14/25  0551 07/12/25  2332   WBC Thousand/uL 7.59 9.65   HEMOGLOBIN g/dL 11.5 12.6   HEMATOCRIT % 33.6* 35.6   PLATELETS Thousands/uL 271 358   SEGS PCT %  --  56   LYMPHO PCT %  --  34   MONO PCT %  --  6    EOS PCT %  --  4     Results from last 7 days   Lab Units 07/14/25  0551 07/12/25  2332   SODIUM mmol/L 135 135   POTASSIUM mmol/L 4.3 3.5   CHLORIDE mmol/L 104 104   CO2 mmol/L 21 24   BUN mg/dL 18 22   CREATININE mg/dL 0.50* 0.50*   ANION GAP mmol/L 10 7   CALCIUM mg/dL 8.3* 8.8   ALBUMIN g/dL  --  3.5   TOTAL BILIRUBIN mg/dL  --  0.35   ALK PHOS U/L  --  91   ALT U/L  --  14   AST U/L  --  10*   GLUCOSE RANDOM mg/dL 177* 220*     Results from last 7 days   Lab Units 07/14/25  0551   INR  1.08     Results from last 7 days   Lab Units 07/14/25  0706 07/14/25  0606 07/13/25  2101 07/13/25  1621 07/13/25  1108 07/13/25  0714 07/13/25  0133 07/12/25  2300   POC GLUCOSE mg/dl 256* 247* 282* 265* 270* 334* 251* 238*     Results from last 7 days   Lab Units 07/12/25  2332   HEMOGLOBIN A1C % 9.8*           Recent Cultures (last 7 days):         Imaging Results Review: I reviewed radiology reports from this admission including: Ultrasound(s).  Other Study Results Review: No additional pertinent studies reviewed.    Last 24 Hours Medication List:     Current Facility-Administered Medications:     acetaminophen (TYLENOL) tablet 975 mg, Q8H ABDULKADIR    albuterol (PROVENTIL HFA,VENTOLIN HFA) inhaler 2 puff, Q4H PRN    Artificial Tears Op Soln 2 drop, Q6H PRN    aspirin chewable tablet 81 mg, Daily    atorvastatin (LIPITOR) tablet 80 mg, Daily With Dinner    cyclobenzaprine (FLEXERIL) tablet 10 mg, TID    DULoxetine (CYMBALTA) delayed release capsule 60 mg, Daily    Fluticasone Furoate-Vilanterol 100-25 mcg/actuation 1 puff, Daily    gabapentin (NEURONTIN) capsule 600 mg, TID    heparin (porcine) 25,000 units in 0.45% NaCl 250 mL infusion (premix), Titrated    heparin (porcine) injection 2,000 Units, Q6H PRN    heparin (porcine) injection 4,000 Units, Q6H PRN    insulin glargine (LANTUS) subcutaneous injection 35 Units 0.35 mL, Q12H ABDULKADIR    insulin lispro (HumALOG/ADMELOG) 100 units/mL subcutaneous injection 1-6 Units, 4x Daily (AC  & HS) **AND** Fingerstick Glucose (POCT), 4x Daily AC and at bedtime    Ketotifen Fumarate (ZADITOR) 0.035 % ophthalmic solution 1 drop, BID PRN    lidocaine (LMX) 4 % cream, 4x Daily PRN    lisinopril (ZESTRIL) tablet 20 mg, Daily    magnesium Oxide (MAG-OX) tablet 400 mg, BID    morphine (MSIR) IR tablet 15 mg, Q4H PRN    morphine injection 2 mg, Q4H PRN    morphine injection 4 mg, Q4H PRN    naloxone (NARCAN) 0.04 mg/mL syringe 0.04 mg, Q1MIN PRN    nicotine (NICODERM CQ) 21 mg/24 hr TD 24 hr patch 1 patch, Daily    ondansetron (ZOFRAN) injection 4 mg, Q4H PRN    prazosin (MINIPRESS) capsule 1 mg, HS    [Held by provider] rivaroxaban (XARELTO) tablet 20 mg, Daily With Breakfast    Administrative Statements   Today, Patient Was Seen By: Shanthi Lane PA-C  I have spent a total time of 45 minutes in caring for this patient on the day of the visit/encounter including Patient and family education, Documenting in the medical record, Reviewing/placing orders in the medical record (including tests, medications, and/or procedures), Obtaining or reviewing history  , and Communicating with other healthcare professionals .    **Please Note: This note may have been constructed using a voice recognition system.**

## 2025-07-14 NOTE — CONSULTS
Consultation - Vascular Surgery   Name: Tony Roberto 39 y.o. female I MRN: [id number]  Unit/Bed#: -01 I Date of Admission: 7/12/2025   Date of Service: 7/14/2025 I Hospital Day: 1   Inpatient consult to Vascular Surgery  Consult performed by: Jenn Colbert PA-C  Consult ordered by: Latoya Green MD        Physician Requesting Evaluation: Adina Monsivais MD   Reason for Evaluation / Principal Problem: PAD; RLE pain     Assessment & Plan  PAD (peripheral artery disease) (HCC)  39 y.o. female with PMH of tobacco use, nonambulatory, with obesity, COPD, HTN, bipolar 1, PAD with hx LLE CLTI s/p left fem-BK pop bypass which subsequently occluded. S/p L AKA for nonsalvageable LLE 3/29/23. She was recently admitted in May for RLE wounds/pain, she underwent RLE angiogram 5/30 with multifocal critical stenosis in SFA and pop. 3V runoff with small vessel disease in the foot and incomplete pedal plantar arch. DCB of the entire SFA and P1 segment of the popliteal artery. Small amount of distal embolization noted into the peroneal and distal PT, treated with tPA. Post intervention NIKKI 6/2 demonstrated no significant improvement in NIKKI, however toe pressures above healing range for a diabetic. She is now admitted for RLE pain. LEAD ordered for further evaluation. Vascular surgery was consulted for recommendations.     Imaging-   LEAD 7/14- Occluded right proximal, mid, and distal SFA with reconstitution distally in proximal popliteal artery. R NIKKI 0.55/49/23.     Labs-  WBC 7.59  Hgb 11.5  sCr 0.50/  INR 1.08    Plan-  -CLTI with RLE rest pain. Hx of L AKA.   -EZEKIEL demonstrates re-occlusion of her entire SFA with reconstitution in the popliteal artery.   -Recommend RLE angiogram for treatment of her SFA occlusion. IR consulted. Timing/location TBD.  -Will order vein mapping for possible bypass planning, however patient would be high risk for open revascularization given uncontrolled DM with A1c of 13%.    -Will need to hold xarelto for 48 hours prior to vascular intervention. SLIM aware. Last dose of Xarelto was 7/14 AM.   -Patient is high risk for more proximal amputation.   -Continue aspirin and statin.   -Pain control per SLIM.   -Smoking cessation.   -D/w CLIVE, IR.   -D/w Dr. Wahl.       Diabetic neuropathy (HCC)  Lab Results   Component Value Date    HGBA1C 13.3 (H) 05/26/2025       Recent Labs     07/13/25  1621 07/13/25  2101 07/14/25  0606 07/14/25  0706   POCGLU 265* 282* 247* 256*       Blood Sugar Average: Last 72 hrs:  (P) 267.875    Essential hypertension    COPD (chronic obstructive pulmonary disease)/asthma    Morbid obesity    Tobacco abuse    Insulin dependent diabetes mellitus (HCC)  Lab Results   Component Value Date    HGBA1C 13.3 (H) 05/26/2025       Recent Labs     07/13/25  1621 07/13/25  2101 07/14/25  0606 07/14/25  0706   POCGLU 265* 282* 247* 256*       Blood Sugar Average: Last 72 hrs:  (P) 267.875    History of polysubstance abuse (HCC)    Psychiatric disorder    History of stroke    Chronic pain of left upper extremity        History of Present Illness   Tony Roberto is a 39 y.o. female with PMH of tobacco use, nonambulatory, with obesity, COPD, HTN, bipolar 1, PAD with hx LLE CLTI s/p left fem-BK pop bypass which subsequently occluded. S/p L AKA for nonsalvageable LLE 3/29/23. She was recently admitted in May for RLE wounds/pain, she underwent RLE angiogram 5/30 with multifocal critical stenosis in SFA and pop. 3V runoff with small vessel disease in the foot and incomplete pedal plantar arch. DCB of the entire SFA and P1 segment of the popliteal artery. Small amount of distal embolization noted into the peroneal and distal PT, treated with tPA. Post intervention NIKKI 6/2 demonstrated no significant improvement in NIKKI, however toe pressures above healing range for a diabetic. She is now admitted for RLE pain. LEAD ordered for further evaluation. Vascular surgery was consulted for  recommendations.     Review of Systems  Medical History Review: I have reviewed the patient's PMH, PSH, Social History, Family History, Meds, and Allergies     Objective :  Temp:  [97.9 °F (36.6 °C)-98.7 °F (37.1 °C)] 98 °F (36.7 °C)  HR:  [86-97] 86  BP: (130-148)/(60-96) 148/85  Resp:  [20] 20  SpO2:  [97 %-99 %] 97 %  O2 Device: None (Room air)    I/O         07/12 0701  07/13 0700 07/13 0701  07/14 0700 07/14 0701  07/15 0700    P.O. 1180 1297     I.V. (mL/kg)  10 (0.1)     Total Intake(mL/kg) 1180 (11.2) 1307 (12.4)     Urine (mL/kg/hr) 400 2200 (0.9)     Total Output 400 2200     Net +780 -893                          Lab Results: I have reviewed the following results:  Recent Labs     07/12/25  2332 07/14/25  0551   WBC 9.65 7.59   HGB 12.6 11.5   HCT 35.6 33.6*    271   SODIUM 135 135   K 3.5 4.3    104   CO2 24 21   BUN 22 18   CREATININE 0.50* 0.50*   GLUC 220* 177*   MG  --  2.1   AST 10*  --    ALT 14  --    ALB 3.5  --    TBILI 0.35  --    ALKPHOS 91  --    INR  --  1.08       Imaging Results Review: I reviewed radiology reports from this admission including: Ultrasound(s).  Other Study Results Review: No additional pertinent studies reviewed.    VTE Prophylaxis: VTE covered by:  heparin (porcine), Intravenous  heparin (porcine), Intravenous  heparin (porcine), Intravenous  [Held by provider] rivaroxaban, Oral, 20 mg at 07/14/25 0884

## 2025-07-14 NOTE — TELEPHONE ENCOUNTER
Consult placed on [facility] queue at 1426 to be seen by an [facility] psychiatrist.

## 2025-07-14 NOTE — PLAN OF CARE
Problem: PAIN - ADULT  Goal: Verbalizes/displays adequate comfort level or baseline comfort level  Description: Interventions:  - Encourage patient to monitor pain and request assistance  - Assess pain using appropriate pain scale  - Administer analgesics as ordered based on type and severity of pain and evaluate response  - Implement non-pharmacological measures as appropriate and evaluate response  - Consider cultural and social influences on pain and pain management  - Notify physician/advanced practitioner if interventions unsuccessful or patient reports new pain  - Educate patient/family on pain management process including their role and importance of  reporting pain   - Provide non-pharmacologic/complimentary pain relief interventions  Outcome: Progressing     Problem: INFECTION - ADULT  Goal: Absence or prevention of progression during hospitalization  Description: INTERVENTIONS:  - Assess and monitor for signs and symptoms of infection  - Monitor lab/diagnostic results  - Monitor all insertion sites, i.e. indwelling lines, tubes, and drains  - Monitor endotracheal if appropriate and nasal secretions for changes in amount and color  - [place] appropriate cooling/warming therapies per order  - Administer medications as ordered  - Instruct and encourage patient and family to use good hand hygiene technique  - Identify and instruct in appropriate isolation precautions for identified infection/condition  Outcome: Progressing     Problem: SAFETY ADULT  Goal: Patient will remain free of falls  Description: INTERVENTIONS:  - Educate patient/family on patient safety including physical limitations  - Instruct patient to call for assistance with activity   - Consider consulting OT/PT to assist with strengthening/mobility based on AM PAC & JH-HLM score  - Consult OT/PT to assist with strengthening/mobility   - Keep Call bell within reach  - Keep bed low and locked with side rails adjusted as appropriate  - Keep  care items and personal belongings within reach  - Initiate and maintain comfort rounds  - Make Fall Risk Sign visible to staff  - Offer Toileting every 2 Hours, in advance of need  - Initiate/Maintain bed alarm  - Apply yellow socks and bracelet for high fall risk patients  - Consider moving patient to room near nurses station  Outcome: Progressing     Problem: DISCHARGE PLANNING  Goal: Discharge to home or other facility with appropriate resources  Description: INTERVENTIONS:  - Identify barriers to discharge w/patient and caregiver  - Arrange for needed discharge resources and transportation as appropriate  - Identify discharge learning needs (meds, wound care, etc.)  - Arrange for interpretive services to assist at discharge as needed  - Refer to Case Management Department for coordinating discharge planning if the patient needs post-hospital services based on physician/advanced practitioner order or complex needs related to functional status, cognitive ability, or social support system  Outcome: Progressing     Problem: Knowledge Deficit  Goal: Patient/family/caregiver demonstrates understanding of disease process, treatment plan, medications, and discharge instructions  Description: Complete learning assessment and assess knowledge base.  Interventions:  - Provide teaching at level of understanding  - Provide teaching via preferred learning methods  Outcome: Progressing     Problem: METABOLIC, FLUID AND ELECTROLYTES - ADULT  Goal: Electrolytes maintained within normal limits  Description: INTERVENTIONS:  - Monitor labs and assess patient for signs and symptoms of electrolyte imbalances  - Administer electrolyte replacement as ordered  - Monitor response to electrolyte replacements, including repeat lab results as appropriate  - Instruct patient on fluid and nutrition as appropriate  Outcome: Progressing  Goal: Glucose maintained within target range  Description: INTERVENTIONS:  - Monitor Blood Glucose as  ordered  - Assess for signs and symptoms of hyperglycemia and hypoglycemia  - Administer ordered medications to maintain glucose within target range  - Assess nutritional intake and initiate nutrition service referral as needed  Outcome: Progressing

## 2025-07-14 NOTE — PROGRESS NOTES
Medication.  Heparin IV delay in starting infusion.  Pt difficult stick and refused to have lab drawn until  it can be done under ultrsound

## 2025-07-15 ENCOUNTER — PATIENT OUTREACH (OUTPATIENT)
Dept: CASE MANAGEMENT | Facility: OTHER | Age: 40
End: 2025-07-15

## 2025-07-15 ENCOUNTER — APPOINTMENT (INPATIENT)
Dept: VASCULAR ULTRASOUND | Facility: HOSPITAL | Age: 40
DRG: 300 | End: 2025-07-15
Payer: COMMERCIAL

## 2025-07-15 ENCOUNTER — HOSPITAL ENCOUNTER (INPATIENT)
Facility: HOSPITAL | Age: 40
LOS: 12 days | Discharge: HOME HEALTH | DRG: 253 | End: 2025-07-27
Attending: FAMILY MEDICINE | Admitting: FAMILY MEDICINE
Payer: COMMERCIAL

## 2025-07-15 VITALS
DIASTOLIC BLOOD PRESSURE: 66 MMHG | RESPIRATION RATE: 20 BRPM | TEMPERATURE: 98.1 F | SYSTOLIC BLOOD PRESSURE: 130 MMHG | BODY MASS INDEX: 43.87 KG/M2 | OXYGEN SATURATION: 97 % | HEIGHT: 61 IN | HEART RATE: 79 BPM | WEIGHT: 232.37 LBS

## 2025-07-15 DIAGNOSIS — M79.604 PAIN OF RIGHT LOWER EXTREMITY: ICD-10-CM

## 2025-07-15 DIAGNOSIS — E11.42 TYPE 2 DIABETES MELLITUS WITH DIABETIC POLYNEUROPATHY, WITH LONG-TERM CURRENT USE OF INSULIN (HCC): ICD-10-CM

## 2025-07-15 DIAGNOSIS — Z79.4 TYPE 2 DIABETES MELLITUS WITH DIABETIC POLYNEUROPATHY, WITH LONG-TERM CURRENT USE OF INSULIN (HCC): ICD-10-CM

## 2025-07-15 DIAGNOSIS — M79.604 BILATERAL LEG PAIN: ICD-10-CM

## 2025-07-15 DIAGNOSIS — E11.9 INSULIN DEPENDENT TYPE 2 DIABETES MELLITUS (HCC): ICD-10-CM

## 2025-07-15 DIAGNOSIS — F31.75 BIPOLAR DISORDER, IN PARTIAL REMISSION, MOST RECENT EPISODE DEPRESSED (HCC): ICD-10-CM

## 2025-07-15 DIAGNOSIS — I10 ESSENTIAL HYPERTENSION: ICD-10-CM

## 2025-07-15 DIAGNOSIS — I73.9 PAD (PERIPHERAL ARTERY DISEASE) (HCC): ICD-10-CM

## 2025-07-15 DIAGNOSIS — R52 ACUTE PAIN: ICD-10-CM

## 2025-07-15 DIAGNOSIS — D50.9 IRON DEFICIENCY ANEMIA, UNSPECIFIED IRON DEFICIENCY ANEMIA TYPE: ICD-10-CM

## 2025-07-15 DIAGNOSIS — I70.422: ICD-10-CM

## 2025-07-15 DIAGNOSIS — E11.43 DIABETIC AUTONOMIC NEUROPATHY ASSOCIATED WITH TYPE 2 DIABETES MELLITUS (HCC): ICD-10-CM

## 2025-07-15 DIAGNOSIS — M79.642 LEFT HAND PAIN: ICD-10-CM

## 2025-07-15 DIAGNOSIS — M79.605 BILATERAL LEG PAIN: ICD-10-CM

## 2025-07-15 DIAGNOSIS — F19.10 POLYSUBSTANCE ABUSE (HCC): ICD-10-CM

## 2025-07-15 DIAGNOSIS — Z89.612 S/P AKA (ABOVE KNEE AMPUTATION) UNILATERAL, LEFT (HCC): ICD-10-CM

## 2025-07-15 DIAGNOSIS — Z79.4 INSULIN DEPENDENT TYPE 2 DIABETES MELLITUS (HCC): ICD-10-CM

## 2025-07-15 DIAGNOSIS — I73.9 PAD (PERIPHERAL ARTERY DISEASE) (HCC): Primary | ICD-10-CM

## 2025-07-15 DIAGNOSIS — E11.40 DIABETIC NEUROPATHY (HCC): ICD-10-CM

## 2025-07-15 LAB
ANION GAP SERPL CALCULATED.3IONS-SCNC: 6 MMOL/L (ref 4–13)
APTT PPP: 62 SECONDS (ref 23–34)
APTT PPP: 70 SECONDS (ref 23–34)
BUN SERPL-MCNC: 20 MG/DL (ref 5–25)
CALCIUM SERPL-MCNC: 8.4 MG/DL (ref 8.4–10.2)
CHLORIDE SERPL-SCNC: 102 MMOL/L (ref 96–108)
CO2 SERPL-SCNC: 27 MMOL/L (ref 21–32)
CREAT SERPL-MCNC: 0.61 MG/DL (ref 0.6–1.3)
ERYTHROCYTE [DISTWIDTH] IN BLOOD BY AUTOMATED COUNT: 14.8 % (ref 11.6–15.1)
GFR SERPL CREATININE-BSD FRML MDRD: 115 ML/MIN/1.73SQ M
GLUCOSE SERPL-MCNC: 186 MG/DL (ref 65–140)
GLUCOSE SERPL-MCNC: 204 MG/DL (ref 65–140)
GLUCOSE SERPL-MCNC: 240 MG/DL (ref 65–140)
GLUCOSE SERPL-MCNC: 259 MG/DL (ref 65–140)
GLUCOSE SERPL-MCNC: 269 MG/DL (ref 65–140)
GLUCOSE SERPL-MCNC: 431 MG/DL (ref 65–140)
HCT VFR BLD AUTO: 34.7 % (ref 34.8–46.1)
HGB BLD-MCNC: 11.8 G/DL (ref 11.5–15.4)
INR PPP: 1.04 (ref 0.85–1.19)
MCH RBC QN AUTO: 25.8 PG (ref 26.8–34.3)
MCHC RBC AUTO-ENTMCNC: 34 G/DL (ref 31.4–37.4)
MCV RBC AUTO: 76 FL (ref 82–98)
PLATELET # BLD AUTO: 251 THOUSANDS/UL (ref 149–390)
PMV BLD AUTO: 11.3 FL (ref 8.9–12.7)
POTASSIUM SERPL-SCNC: 4 MMOL/L (ref 3.5–5.3)
PROTHROMBIN TIME: 14 SECONDS (ref 12.3–15)
RBC # BLD AUTO: 4.57 MILLION/UL (ref 3.81–5.12)
SODIUM SERPL-SCNC: 135 MMOL/L (ref 135–147)
WBC # BLD AUTO: 7.78 THOUSAND/UL (ref 4.31–10.16)

## 2025-07-15 PROCEDURE — 99239 HOSP IP/OBS DSCHRG MGMT >30: CPT

## 2025-07-15 PROCEDURE — 85730 THROMBOPLASTIN TIME PARTIAL: CPT | Performed by: INTERNAL MEDICINE

## 2025-07-15 PROCEDURE — 80048 BASIC METABOLIC PNL TOTAL CA: CPT | Performed by: PHYSICIAN ASSISTANT

## 2025-07-15 PROCEDURE — 93971 EXTREMITY STUDY: CPT

## 2025-07-15 PROCEDURE — 82948 REAGENT STRIP/BLOOD GLUCOSE: CPT

## 2025-07-15 PROCEDURE — NC001 PR NO CHARGE: Performed by: RADIOLOGY

## 2025-07-15 PROCEDURE — NC001 PR NO CHARGE: Performed by: FAMILY MEDICINE

## 2025-07-15 PROCEDURE — 85027 COMPLETE CBC AUTOMATED: CPT | Performed by: PHYSICIAN ASSISTANT

## 2025-07-15 PROCEDURE — 85610 PROTHROMBIN TIME: CPT | Performed by: PHYSICIAN ASSISTANT

## 2025-07-15 RX ORDER — DULOXETIN HYDROCHLORIDE 60 MG/1
60 CAPSULE, DELAYED RELEASE ORAL DAILY
Status: DISCONTINUED | OUTPATIENT
Start: 2025-07-16 | End: 2025-07-27 | Stop reason: HOSPADM

## 2025-07-15 RX ORDER — MORPHINE SULFATE 15 MG/1
15 TABLET ORAL EVERY 4 HOURS PRN
Status: DISCONTINUED | OUTPATIENT
Start: 2025-07-15 | End: 2025-07-15

## 2025-07-15 RX ORDER — INSULIN GLARGINE 100 [IU]/ML
38 INJECTION, SOLUTION SUBCUTANEOUS EVERY 12 HOURS SCHEDULED
Status: DISCONTINUED | OUTPATIENT
Start: 2025-07-15 | End: 2025-07-16

## 2025-07-15 RX ORDER — LISINOPRIL 20 MG/1
20 TABLET ORAL DAILY
Status: CANCELLED | OUTPATIENT
Start: 2025-07-16

## 2025-07-15 RX ORDER — ATORVASTATIN CALCIUM 40 MG/1
80 TABLET, FILM COATED ORAL
Status: CANCELLED | OUTPATIENT
Start: 2025-07-15

## 2025-07-15 RX ORDER — FLUTICASONE FUROATE AND VILANTEROL 100; 25 UG/1; UG/1
1 POWDER RESPIRATORY (INHALATION)
Status: CANCELLED | OUTPATIENT
Start: 2025-07-16

## 2025-07-15 RX ORDER — ARIPIPRAZOLE 5 MG/1
5 TABLET ORAL DAILY
Status: DISCONTINUED | OUTPATIENT
Start: 2025-07-16 | End: 2025-07-27 | Stop reason: HOSPADM

## 2025-07-15 RX ORDER — HEPARIN SODIUM 1000 [USP'U]/ML
2000 INJECTION, SOLUTION INTRAVENOUS; SUBCUTANEOUS EVERY 6 HOURS PRN
Status: DISCONTINUED | OUTPATIENT
Start: 2025-07-15 | End: 2025-07-22

## 2025-07-15 RX ORDER — ALBUTEROL SULFATE 90 UG/1
2 INHALANT RESPIRATORY (INHALATION) EVERY 4 HOURS PRN
Status: CANCELLED | OUTPATIENT
Start: 2025-07-15

## 2025-07-15 RX ORDER — NICOTINE 21 MG/24HR
1 PATCH, TRANSDERMAL 24 HOURS TRANSDERMAL DAILY
Status: CANCELLED | OUTPATIENT
Start: 2025-07-16

## 2025-07-15 RX ORDER — LIDOCAINE 40 MG/G
CREAM TOPICAL 4 TIMES DAILY PRN
Status: CANCELLED | OUTPATIENT
Start: 2025-07-15

## 2025-07-15 RX ORDER — ALBUTEROL SULFATE 90 UG/1
2 INHALANT RESPIRATORY (INHALATION) EVERY 4 HOURS PRN
Status: DISCONTINUED | OUTPATIENT
Start: 2025-07-15 | End: 2025-07-27 | Stop reason: HOSPADM

## 2025-07-15 RX ORDER — ACETAMINOPHEN 325 MG/1
975 TABLET ORAL EVERY 8 HOURS SCHEDULED
Status: DISCONTINUED | OUTPATIENT
Start: 2025-07-15 | End: 2025-07-27 | Stop reason: HOSPADM

## 2025-07-15 RX ORDER — GABAPENTIN 300 MG/1
600 CAPSULE ORAL 3 TIMES DAILY
Status: DISCONTINUED | OUTPATIENT
Start: 2025-07-15 | End: 2025-07-21

## 2025-07-15 RX ORDER — OXYCODONE HYDROCHLORIDE 5 MG/1
5 TABLET ORAL EVERY 6 HOURS PRN
Refills: 0 | Status: DISCONTINUED | OUTPATIENT
Start: 2025-07-15 | End: 2025-07-16

## 2025-07-15 RX ORDER — LANOLIN ALCOHOL/MO/W.PET/CERES
400 CREAM (GRAM) TOPICAL 2 TIMES DAILY
Status: DISCONTINUED | OUTPATIENT
Start: 2025-07-16 | End: 2025-07-21

## 2025-07-15 RX ORDER — DULOXETIN HYDROCHLORIDE 60 MG/1
60 CAPSULE, DELAYED RELEASE ORAL DAILY
Status: CANCELLED | OUTPATIENT
Start: 2025-07-16

## 2025-07-15 RX ORDER — HEPARIN SODIUM 10000 [USP'U]/100ML
3-20 INJECTION, SOLUTION INTRAVENOUS
Status: DISCONTINUED | OUTPATIENT
Start: 2025-07-15 | End: 2025-07-22

## 2025-07-15 RX ORDER — LIDOCAINE 40 MG/G
CREAM TOPICAL 4 TIMES DAILY PRN
Status: DISCONTINUED | OUTPATIENT
Start: 2025-07-15 | End: 2025-07-27 | Stop reason: HOSPADM

## 2025-07-15 RX ORDER — ATORVASTATIN CALCIUM 80 MG/1
80 TABLET, FILM COATED ORAL
Status: DISCONTINUED | OUTPATIENT
Start: 2025-07-16 | End: 2025-07-27 | Stop reason: HOSPADM

## 2025-07-15 RX ORDER — KETOTIFEN FUMARATE 0.35 MG/ML
1 SOLUTION/ DROPS OPHTHALMIC 2 TIMES DAILY PRN
Status: DISCONTINUED | OUTPATIENT
Start: 2025-07-15 | End: 2025-07-27 | Stop reason: HOSPADM

## 2025-07-15 RX ORDER — PRAZOSIN HYDROCHLORIDE 1 MG/1
1 CAPSULE ORAL
Status: DISCONTINUED | OUTPATIENT
Start: 2025-07-15 | End: 2025-07-27 | Stop reason: HOSPADM

## 2025-07-15 RX ORDER — HEPARIN SODIUM 1000 [USP'U]/ML
4000 INJECTION, SOLUTION INTRAVENOUS; SUBCUTANEOUS EVERY 6 HOURS PRN
Status: CANCELLED | OUTPATIENT
Start: 2025-07-15

## 2025-07-15 RX ORDER — CYCLOBENZAPRINE HCL 10 MG
10 TABLET ORAL 3 TIMES DAILY
Status: CANCELLED | OUTPATIENT
Start: 2025-07-15

## 2025-07-15 RX ORDER — INSULIN GLARGINE 100 [IU]/ML
38 INJECTION, SOLUTION SUBCUTANEOUS EVERY 12 HOURS SCHEDULED
Status: CANCELLED | OUTPATIENT
Start: 2025-07-15

## 2025-07-15 RX ORDER — MORPHINE SULFATE 15 MG/1
15 TABLET ORAL EVERY 4 HOURS PRN
Refills: 0 | Status: CANCELLED | OUTPATIENT
Start: 2025-07-15

## 2025-07-15 RX ORDER — INSULIN LISPRO 100 [IU]/ML
1-6 INJECTION, SOLUTION INTRAVENOUS; SUBCUTANEOUS
Status: CANCELLED | OUTPATIENT
Start: 2025-07-15

## 2025-07-15 RX ORDER — MORPHINE SULFATE 4 MG/ML
4 INJECTION, SOLUTION INTRAMUSCULAR; INTRAVENOUS EVERY 4 HOURS PRN
Status: DISCONTINUED | OUTPATIENT
Start: 2025-07-15 | End: 2025-07-18

## 2025-07-15 RX ORDER — KETOTIFEN FUMARATE 0.35 MG/ML
1 SOLUTION/ DROPS OPHTHALMIC 2 TIMES DAILY PRN
Status: CANCELLED | OUTPATIENT
Start: 2025-07-15

## 2025-07-15 RX ORDER — GABAPENTIN 300 MG/1
600 CAPSULE ORAL 3 TIMES DAILY
Status: CANCELLED | OUTPATIENT
Start: 2025-07-15

## 2025-07-15 RX ORDER — NICOTINE 21 MG/24HR
1 PATCH, TRANSDERMAL 24 HOURS TRANSDERMAL DAILY
Status: DISCONTINUED | OUTPATIENT
Start: 2025-07-16 | End: 2025-07-27 | Stop reason: HOSPADM

## 2025-07-15 RX ORDER — ASPIRIN 81 MG/1
81 TABLET, CHEWABLE ORAL DAILY
Status: CANCELLED | OUTPATIENT
Start: 2025-07-16

## 2025-07-15 RX ORDER — MORPHINE SULFATE 4 MG/ML
4 INJECTION, SOLUTION INTRAMUSCULAR; INTRAVENOUS EVERY 4 HOURS PRN
Status: CANCELLED | OUTPATIENT
Start: 2025-07-15

## 2025-07-15 RX ORDER — ACETAMINOPHEN 325 MG/1
975 TABLET ORAL EVERY 8 HOURS SCHEDULED
Status: CANCELLED | OUTPATIENT
Start: 2025-07-15

## 2025-07-15 RX ORDER — INSULIN LISPRO 100 [IU]/ML
5 INJECTION, SOLUTION INTRAVENOUS; SUBCUTANEOUS ONCE
Status: COMPLETED | OUTPATIENT
Start: 2025-07-15 | End: 2025-07-15

## 2025-07-15 RX ORDER — ARIPIPRAZOLE 5 MG/1
5 TABLET ORAL DAILY
Status: CANCELLED | OUTPATIENT
Start: 2025-07-16

## 2025-07-15 RX ORDER — LANOLIN ALCOHOL/MO/W.PET/CERES
400 CREAM (GRAM) TOPICAL 2 TIMES DAILY
Status: CANCELLED | OUTPATIENT
Start: 2025-07-15

## 2025-07-15 RX ORDER — ONDANSETRON 2 MG/ML
4 INJECTION INTRAMUSCULAR; INTRAVENOUS EVERY 4 HOURS PRN
Status: DISCONTINUED | OUTPATIENT
Start: 2025-07-15 | End: 2025-07-26

## 2025-07-15 RX ORDER — ASPIRIN 81 MG/1
81 TABLET, CHEWABLE ORAL DAILY
Status: DISCONTINUED | OUTPATIENT
Start: 2025-07-16 | End: 2025-07-27 | Stop reason: HOSPADM

## 2025-07-15 RX ORDER — CYCLOBENZAPRINE HCL 10 MG
10 TABLET ORAL 3 TIMES DAILY
Status: DISCONTINUED | OUTPATIENT
Start: 2025-07-15 | End: 2025-07-27 | Stop reason: HOSPADM

## 2025-07-15 RX ORDER — NICOTINE 21 MG/24HR
1 PATCH, TRANSDERMAL 24 HOURS TRANSDERMAL DAILY
Status: DISCONTINUED | OUTPATIENT
Start: 2025-07-16 | End: 2025-07-15

## 2025-07-15 RX ORDER — HEPARIN SODIUM 1000 [USP'U]/ML
4000 INJECTION, SOLUTION INTRAVENOUS; SUBCUTANEOUS EVERY 6 HOURS PRN
Status: DISCONTINUED | OUTPATIENT
Start: 2025-07-15 | End: 2025-07-22

## 2025-07-15 RX ORDER — PRAZOSIN HYDROCHLORIDE 1 MG/1
1 CAPSULE ORAL
Status: CANCELLED | OUTPATIENT
Start: 2025-07-15

## 2025-07-15 RX ORDER — FLUTICASONE FUROATE AND VILANTEROL 100; 25 UG/1; UG/1
1 POWDER RESPIRATORY (INHALATION)
Status: DISCONTINUED | OUTPATIENT
Start: 2025-07-16 | End: 2025-07-27 | Stop reason: HOSPADM

## 2025-07-15 RX ORDER — INSULIN LISPRO 100 [IU]/ML
1-6 INJECTION, SOLUTION INTRAVENOUS; SUBCUTANEOUS
Status: DISCONTINUED | OUTPATIENT
Start: 2025-07-15 | End: 2025-07-16

## 2025-07-15 RX ORDER — HEPARIN SODIUM 10000 [USP'U]/100ML
3-20 INJECTION, SOLUTION INTRAVENOUS
Status: CANCELLED | OUTPATIENT
Start: 2025-07-15

## 2025-07-15 RX ORDER — ONDANSETRON 2 MG/ML
4 INJECTION INTRAMUSCULAR; INTRAVENOUS EVERY 4 HOURS PRN
Status: CANCELLED | OUTPATIENT
Start: 2025-07-15

## 2025-07-15 RX ORDER — LISINOPRIL 20 MG/1
20 TABLET ORAL DAILY
Status: DISCONTINUED | OUTPATIENT
Start: 2025-07-16 | End: 2025-07-23

## 2025-07-15 RX ORDER — HEPARIN SODIUM 1000 [USP'U]/ML
2000 INJECTION, SOLUTION INTRAVENOUS; SUBCUTANEOUS EVERY 6 HOURS PRN
Status: CANCELLED | OUTPATIENT
Start: 2025-07-15

## 2025-07-15 RX ADMIN — GABAPENTIN 600 MG: 300 CAPSULE ORAL at 08:46

## 2025-07-15 RX ADMIN — LISINOPRIL 20 MG: 20 TABLET ORAL at 08:47

## 2025-07-15 RX ADMIN — Medication 400 MG: at 08:46

## 2025-07-15 RX ADMIN — INSULIN LISPRO 6 UNITS: 100 INJECTION, SOLUTION INTRAVENOUS; SUBCUTANEOUS at 22:26

## 2025-07-15 RX ADMIN — MORPHINE SULFATE 2 MG: 2 INJECTION, SOLUTION INTRAMUSCULAR; INTRAVENOUS at 09:39

## 2025-07-15 RX ADMIN — INSULIN GLARGINE 38 UNITS: 100 INJECTION, SOLUTION SUBCUTANEOUS at 08:45

## 2025-07-15 RX ADMIN — ACETAMINOPHEN 975 MG: 325 TABLET, FILM COATED ORAL at 14:49

## 2025-07-15 RX ADMIN — INSULIN LISPRO 5 UNITS: 100 INJECTION, SOLUTION INTRAVENOUS; SUBCUTANEOUS at 22:54

## 2025-07-15 RX ADMIN — ASPIRIN 81 MG 81 MG: 81 TABLET ORAL at 08:46

## 2025-07-15 RX ADMIN — CYCLOBENZAPRINE HYDROCHLORIDE 10 MG: 10 TABLET, FILM COATED ORAL at 22:21

## 2025-07-15 RX ADMIN — GABAPENTIN 600 MG: 300 CAPSULE ORAL at 17:04

## 2025-07-15 RX ADMIN — HEPARIN SODIUM 13.1 UNITS/KG/HR: 10000 INJECTION, SOLUTION INTRAVENOUS at 19:52

## 2025-07-15 RX ADMIN — HEPARIN SODIUM 13.1 UNITS/KG/HR: 10000 INJECTION, SOLUTION INTRAVENOUS at 14:47

## 2025-07-15 RX ADMIN — ACETAMINOPHEN 975 MG: 325 TABLET, FILM COATED ORAL at 05:17

## 2025-07-15 RX ADMIN — ARIPIPRAZOLE 5 MG: 5 TABLET ORAL at 08:46

## 2025-07-15 RX ADMIN — Medication 400 MG: at 17:04

## 2025-07-15 RX ADMIN — INSULIN LISPRO 2 UNITS: 100 INJECTION, SOLUTION INTRAVENOUS; SUBCUTANEOUS at 11:32

## 2025-07-15 RX ADMIN — INSULIN GLARGINE 38 UNITS: 100 INJECTION, SOLUTION SUBCUTANEOUS at 22:26

## 2025-07-15 RX ADMIN — GABAPENTIN 600 MG: 300 CAPSULE ORAL at 22:21

## 2025-07-15 RX ADMIN — INSULIN LISPRO 3 UNITS: 100 INJECTION, SOLUTION INTRAVENOUS; SUBCUTANEOUS at 08:45

## 2025-07-15 RX ADMIN — CYCLOBENZAPRINE 10 MG: 10 TABLET, FILM COATED ORAL at 17:04

## 2025-07-15 RX ADMIN — INSULIN LISPRO 3 UNITS: 100 INJECTION, SOLUTION INTRAVENOUS; SUBCUTANEOUS at 17:05

## 2025-07-15 RX ADMIN — ACETAMINOPHEN 975 MG: 325 TABLET ORAL at 22:21

## 2025-07-15 RX ADMIN — CYCLOBENZAPRINE 10 MG: 10 TABLET, FILM COATED ORAL at 08:46

## 2025-07-15 RX ADMIN — NICOTINE 1 PATCH: 21 PATCH, EXTENDED RELEASE TRANSDERMAL at 08:48

## 2025-07-15 RX ADMIN — MORPHINE SULFATE 2 MG: 2 INJECTION, SOLUTION INTRAMUSCULAR; INTRAVENOUS at 20:42

## 2025-07-15 RX ADMIN — DULOXETINE HYDROCHLORIDE 60 MG: 60 CAPSULE, DELAYED RELEASE ORAL at 08:47

## 2025-07-15 RX ADMIN — ATORVASTATIN CALCIUM 80 MG: 40 TABLET, FILM COATED ORAL at 17:04

## 2025-07-15 RX ADMIN — PRAZOSIN HYDROCHLORIDE 1 MG: 1 CAPSULE ORAL at 22:21

## 2025-07-16 ENCOUNTER — ANESTHESIA EVENT (OUTPATIENT)
Dept: ANESTHESIOLOGY | Facility: HOSPITAL | Age: 40
End: 2025-07-16

## 2025-07-16 ENCOUNTER — ANESTHESIA (OUTPATIENT)
Dept: ANESTHESIOLOGY | Facility: HOSPITAL | Age: 40
End: 2025-07-16

## 2025-07-16 ENCOUNTER — TRANSITIONAL CARE MANAGEMENT (OUTPATIENT)
Dept: FAMILY MEDICINE CLINIC | Facility: CLINIC | Age: 40
End: 2025-07-16

## 2025-07-16 LAB
ALBUMIN SERPL BCG-MCNC: 3.2 G/DL (ref 3.5–5)
ALP SERPL-CCNC: 106 U/L (ref 34–104)
ALT SERPL W P-5'-P-CCNC: 21 U/L (ref 7–52)
ANION GAP SERPL CALCULATED.3IONS-SCNC: 8 MMOL/L (ref 4–13)
ANION GAP SERPL CALCULATED.3IONS-SCNC: 9 MMOL/L (ref 4–13)
APTT PPP: 107 SECONDS (ref 23–34)
APTT PPP: 55 SECONDS (ref 23–34)
AST SERPL W P-5'-P-CCNC: 17 U/L (ref 13–39)
B-OH-BUTYR SERPL-MCNC: <0.05 MMOL/L (ref 0.2–0.6)
BASE EX.OXY STD BLDV CALC-SCNC: 91 % (ref 60–80)
BASE EXCESS BLDV CALC-SCNC: -2 MMOL/L
BASOPHILS # BLD AUTO: 0.05 THOUSANDS/ÂΜL (ref 0–0.1)
BASOPHILS NFR BLD AUTO: 1 % (ref 0–1)
BILIRUB SERPL-MCNC: 0.26 MG/DL (ref 0.2–1)
BODY TEMPERATURE: 98.3 DEGREES FEHRENHEIT
BUN SERPL-MCNC: 14 MG/DL (ref 5–25)
BUN SERPL-MCNC: 15 MG/DL (ref 5–25)
CALCIUM ALBUM COR SERPL-MCNC: 9 MG/DL (ref 8.3–10.1)
CALCIUM SERPL-MCNC: 8.3 MG/DL (ref 8.4–10.2)
CALCIUM SERPL-MCNC: 8.4 MG/DL (ref 8.4–10.2)
CHLORIDE SERPL-SCNC: 101 MMOL/L (ref 96–108)
CHLORIDE SERPL-SCNC: 102 MMOL/L (ref 96–108)
CO2 SERPL-SCNC: 22 MMOL/L (ref 21–32)
CO2 SERPL-SCNC: 27 MMOL/L (ref 21–32)
CREAT SERPL-MCNC: 0.45 MG/DL (ref 0.6–1.3)
CREAT SERPL-MCNC: 0.57 MG/DL (ref 0.6–1.3)
EOSINOPHIL # BLD AUTO: 0.23 THOUSAND/ÂΜL (ref 0–0.61)
EOSINOPHIL NFR BLD AUTO: 3 % (ref 0–6)
ERYTHROCYTE [DISTWIDTH] IN BLOOD BY AUTOMATED COUNT: 15.1 % (ref 11.6–15.1)
GFR SERPL CREATININE-BSD FRML MDRD: 117 ML/MIN/1.73SQ M
GFR SERPL CREATININE-BSD FRML MDRD: 127 ML/MIN/1.73SQ M
GLUCOSE SERPL-MCNC: 153 MG/DL (ref 65–140)
GLUCOSE SERPL-MCNC: 218 MG/DL (ref 65–140)
GLUCOSE SERPL-MCNC: 227 MG/DL (ref 65–140)
GLUCOSE SERPL-MCNC: 295 MG/DL (ref 65–140)
GLUCOSE SERPL-MCNC: 360 MG/DL (ref 65–140)
GLUCOSE SERPL-MCNC: 374 MG/DL (ref 65–140)
GLUCOSE SERPL-MCNC: 415 MG/DL (ref 65–140)
GLUCOSE SERPL-MCNC: 424 MG/DL (ref 65–140)
HCO3 BLDV-SCNC: 23 MMOL/L (ref 24–30)
HCT VFR BLD AUTO: 34.9 % (ref 34.8–46.1)
HGB BLD-MCNC: 12.2 G/DL (ref 11.5–15.4)
IMM GRANULOCYTES # BLD AUTO: 0.06 THOUSAND/UL (ref 0–0.2)
IMM GRANULOCYTES NFR BLD AUTO: 1 % (ref 0–2)
LYMPHOCYTES # BLD AUTO: 3 THOUSANDS/ÂΜL (ref 0.6–4.47)
LYMPHOCYTES NFR BLD AUTO: 37 % (ref 14–44)
MAGNESIUM SERPL-MCNC: 1.8 MG/DL (ref 1.9–2.7)
MAGNESIUM SERPL-MCNC: 2.2 MG/DL (ref 1.9–2.7)
MCH RBC QN AUTO: 26 PG (ref 26.8–34.3)
MCHC RBC AUTO-ENTMCNC: 35 G/DL (ref 31.4–37.4)
MCV RBC AUTO: 74 FL (ref 82–98)
MONOCYTES # BLD AUTO: 0.53 THOUSAND/ÂΜL (ref 0.17–1.22)
MONOCYTES NFR BLD AUTO: 7 % (ref 4–12)
NEUTROPHILS # BLD AUTO: 4.24 THOUSANDS/ÂΜL (ref 1.85–7.62)
NEUTS SEG NFR BLD AUTO: 51 % (ref 43–75)
NRBC BLD AUTO-RTO: 0 /100 WBCS
O2 CT BLDV-SCNC: 16.3 ML/DL
PCO2 BLD: 39.9 MM HG (ref 42–50)
PCO2 BLDV: 40.3 MM HG (ref 42–50)
PH BLD: 7.38 [PH] (ref 7.3–7.4)
PH BLDV: 7.38 [PH] (ref 7.3–7.4)
PHOSPHATE SERPL-MCNC: 2.4 MG/DL (ref 2.7–4.5)
PHOSPHATE SERPL-MCNC: 3 MG/DL (ref 2.7–4.5)
PLATELET # BLD AUTO: 343 THOUSANDS/UL (ref 149–390)
PMV BLD AUTO: 10.5 FL (ref 8.9–12.7)
PO2 BLDV: 67.1 MM HG (ref 35–45)
PO2 VENOUS TEMP CORRECTED: 66.2 MM HG (ref 35–45)
POTASSIUM SERPL-SCNC: 4 MMOL/L (ref 3.5–5.3)
POTASSIUM SERPL-SCNC: 4 MMOL/L (ref 3.5–5.3)
PROT SERPL-MCNC: 6.2 G/DL (ref 6.4–8.4)
RBC # BLD AUTO: 4.7 MILLION/UL (ref 3.81–5.12)
SODIUM SERPL-SCNC: 132 MMOL/L (ref 135–147)
SODIUM SERPL-SCNC: 137 MMOL/L (ref 135–147)
WBC # BLD AUTO: 8.11 THOUSAND/UL (ref 4.31–10.16)

## 2025-07-16 PROCEDURE — NC001 PR NO CHARGE: Performed by: PHYSICIAN ASSISTANT

## 2025-07-16 PROCEDURE — 83735 ASSAY OF MAGNESIUM: CPT

## 2025-07-16 PROCEDURE — 93971 EXTREMITY STUDY: CPT | Performed by: SURGERY

## 2025-07-16 PROCEDURE — 80048 BASIC METABOLIC PNL TOTAL CA: CPT

## 2025-07-16 PROCEDURE — 80053 COMPREHEN METABOLIC PANEL: CPT

## 2025-07-16 PROCEDURE — 82010 KETONE BODYS QUAN: CPT

## 2025-07-16 PROCEDURE — 99232 SBSQ HOSP IP/OBS MODERATE 35: CPT | Performed by: FAMILY MEDICINE

## 2025-07-16 PROCEDURE — 85025 COMPLETE CBC W/AUTO DIFF WBC: CPT

## 2025-07-16 PROCEDURE — 82805 BLOOD GASES W/O2 SATURATION: CPT

## 2025-07-16 PROCEDURE — 84100 ASSAY OF PHOSPHORUS: CPT

## 2025-07-16 PROCEDURE — 82948 REAGENT STRIP/BLOOD GLUCOSE: CPT

## 2025-07-16 PROCEDURE — 85730 THROMBOPLASTIN TIME PARTIAL: CPT | Performed by: FAMILY MEDICINE

## 2025-07-16 PROCEDURE — 99232 SBSQ HOSP IP/OBS MODERATE 35: CPT | Performed by: SURGERY

## 2025-07-16 RX ORDER — INSULIN LISPRO 100 [IU]/ML
1-6 INJECTION, SOLUTION INTRAVENOUS; SUBCUTANEOUS EVERY 6 HOURS SCHEDULED
Status: DISCONTINUED | OUTPATIENT
Start: 2025-07-17 | End: 2025-07-17

## 2025-07-16 RX ORDER — SODIUM CHLORIDE 9 MG/ML
125 INJECTION, SOLUTION INTRAVENOUS CONTINUOUS
Status: DISCONTINUED | OUTPATIENT
Start: 2025-07-16 | End: 2025-07-16

## 2025-07-16 RX ORDER — INSULIN GLARGINE 100 [IU]/ML
38 INJECTION, SOLUTION SUBCUTANEOUS EVERY 12 HOURS SCHEDULED
Status: DISCONTINUED | OUTPATIENT
Start: 2025-07-16 | End: 2025-07-16

## 2025-07-16 RX ORDER — POLYETHYLENE GLYCOL 3350 17 G/17G
17 POWDER, FOR SOLUTION ORAL DAILY
Status: DISCONTINUED | OUTPATIENT
Start: 2025-07-16 | End: 2025-07-27 | Stop reason: HOSPADM

## 2025-07-16 RX ORDER — INSULIN GLARGINE 100 [IU]/ML
19 INJECTION, SOLUTION SUBCUTANEOUS EVERY 12 HOURS SCHEDULED
Status: DISCONTINUED | OUTPATIENT
Start: 2025-07-17 | End: 2025-07-16

## 2025-07-16 RX ORDER — BISACODYL 5 MG/1
5 TABLET, DELAYED RELEASE ORAL DAILY PRN
Status: DISCONTINUED | OUTPATIENT
Start: 2025-07-16 | End: 2025-07-16

## 2025-07-16 RX ORDER — INSULIN GLARGINE 100 [IU]/ML
19 INJECTION, SOLUTION SUBCUTANEOUS EVERY 12 HOURS SCHEDULED
Status: DISCONTINUED | OUTPATIENT
Start: 2025-07-16 | End: 2025-07-16

## 2025-07-16 RX ORDER — INSULIN GLARGINE 100 [IU]/ML
19 INJECTION, SOLUTION SUBCUTANEOUS EVERY 12 HOURS SCHEDULED
Status: DISCONTINUED | OUTPATIENT
Start: 2025-07-16 | End: 2025-07-18

## 2025-07-16 RX ORDER — INSULIN LISPRO 100 [IU]/ML
1-6 INJECTION, SOLUTION INTRAVENOUS; SUBCUTANEOUS EVERY 6 HOURS
Status: DISCONTINUED | OUTPATIENT
Start: 2025-07-16 | End: 2025-07-16

## 2025-07-16 RX ORDER — INSULIN LISPRO 100 [IU]/ML
6 INJECTION, SOLUTION INTRAVENOUS; SUBCUTANEOUS ONCE
Status: COMPLETED | OUTPATIENT
Start: 2025-07-16 | End: 2025-07-16

## 2025-07-16 RX ORDER — AMOXICILLIN 250 MG
1 CAPSULE ORAL
Status: DISCONTINUED | OUTPATIENT
Start: 2025-07-16 | End: 2025-07-19

## 2025-07-16 RX ORDER — OXYCODONE HYDROCHLORIDE 10 MG/1
10 TABLET ORAL EVERY 4 HOURS PRN
Refills: 0 | Status: DISCONTINUED | OUTPATIENT
Start: 2025-07-16 | End: 2025-07-21

## 2025-07-16 RX ORDER — ACETAMINOPHEN 10 MG/ML
1000 INJECTION, SOLUTION INTRAVENOUS ONCE
Status: COMPLETED | OUTPATIENT
Start: 2025-07-16 | End: 2025-07-16

## 2025-07-16 RX ORDER — BISACODYL 5 MG/1
5 TABLET, DELAYED RELEASE ORAL DAILY
Status: DISCONTINUED | OUTPATIENT
Start: 2025-07-16 | End: 2025-07-16

## 2025-07-16 RX ORDER — OXYCODONE HYDROCHLORIDE 5 MG/1
5 TABLET ORAL EVERY 4 HOURS PRN
Refills: 0 | Status: DISCONTINUED | OUTPATIENT
Start: 2025-07-16 | End: 2025-07-21

## 2025-07-16 RX ORDER — MAGNESIUM SULFATE HEPTAHYDRATE 40 MG/ML
2 INJECTION, SOLUTION INTRAVENOUS ONCE
Status: COMPLETED | OUTPATIENT
Start: 2025-07-16 | End: 2025-07-16

## 2025-07-16 RX ORDER — INSULIN LISPRO 100 [IU]/ML
1-6 INJECTION, SOLUTION INTRAVENOUS; SUBCUTANEOUS
Status: DISCONTINUED | OUTPATIENT
Start: 2025-07-16 | End: 2025-07-17

## 2025-07-16 RX ORDER — SODIUM CHLORIDE 9 MG/ML
125 INJECTION, SOLUTION INTRAVENOUS CONTINUOUS
Status: DISCONTINUED | OUTPATIENT
Start: 2025-07-17 | End: 2025-07-17

## 2025-07-16 RX ADMIN — GABAPENTIN 600 MG: 300 CAPSULE ORAL at 09:28

## 2025-07-16 RX ADMIN — SODIUM CHLORIDE 125 ML/HR: 0.9 INJECTION, SOLUTION INTRAVENOUS at 10:45

## 2025-07-16 RX ADMIN — CYCLOBENZAPRINE HYDROCHLORIDE 10 MG: 10 TABLET, FILM COATED ORAL at 21:06

## 2025-07-16 RX ADMIN — ARIPIPRAZOLE 5 MG: 5 TABLET ORAL at 09:30

## 2025-07-16 RX ADMIN — ACETAMINOPHEN 975 MG: 325 TABLET ORAL at 13:49

## 2025-07-16 RX ADMIN — PRAZOSIN HYDROCHLORIDE 1 MG: 1 CAPSULE ORAL at 21:07

## 2025-07-16 RX ADMIN — MORPHINE SULFATE 2 MG: 2 INJECTION, SOLUTION INTRAMUSCULAR; INTRAVENOUS at 17:27

## 2025-07-16 RX ADMIN — OXYCODONE HYDROCHLORIDE 10 MG: 10 TABLET ORAL at 16:48

## 2025-07-16 RX ADMIN — HEPARIN SODIUM 2000 UNITS: 1000 INJECTION, SOLUTION INTRAVENOUS; SUBCUTANEOUS at 06:09

## 2025-07-16 RX ADMIN — OXYCODONE HYDROCHLORIDE 10 MG: 10 TABLET ORAL at 22:26

## 2025-07-16 RX ADMIN — CYCLOBENZAPRINE HYDROCHLORIDE 10 MG: 10 TABLET, FILM COATED ORAL at 17:27

## 2025-07-16 RX ADMIN — MORPHINE SULFATE 2 MG: 2 INJECTION, SOLUTION INTRAMUSCULAR; INTRAVENOUS at 23:36

## 2025-07-16 RX ADMIN — ATORVASTATIN CALCIUM 80 MG: 80 TABLET, FILM COATED ORAL at 17:27

## 2025-07-16 RX ADMIN — MORPHINE SULFATE 2 MG: 2 INJECTION, SOLUTION INTRAMUSCULAR; INTRAVENOUS at 10:40

## 2025-07-16 RX ADMIN — ASPIRIN 81 MG CHEWABLE TABLET 81 MG: 81 TABLET CHEWABLE at 09:29

## 2025-07-16 RX ADMIN — GABAPENTIN 600 MG: 300 CAPSULE ORAL at 21:06

## 2025-07-16 RX ADMIN — GABAPENTIN 600 MG: 300 CAPSULE ORAL at 17:27

## 2025-07-16 RX ADMIN — POTASSIUM PHOSPHATE, MONOBASIC POTASSIUM PHOSPHATE, DIBASIC 9 MMOL: 224; 236 INJECTION, SOLUTION, CONCENTRATE INTRAVENOUS at 05:20

## 2025-07-16 RX ADMIN — INSULIN LISPRO 6 UNITS: 100 INJECTION, SOLUTION INTRAVENOUS; SUBCUTANEOUS at 23:47

## 2025-07-16 RX ADMIN — MORPHINE SULFATE 2 MG: 2 INJECTION, SOLUTION INTRAMUSCULAR; INTRAVENOUS at 06:40

## 2025-07-16 RX ADMIN — ACETAMINOPHEN 1000 MG: 10 INJECTION INTRAVENOUS at 05:38

## 2025-07-16 RX ADMIN — INSULIN LISPRO 6 UNITS: 100 INJECTION, SOLUTION INTRAVENOUS; SUBCUTANEOUS at 02:08

## 2025-07-16 RX ADMIN — Medication 2.5 MG: at 02:48

## 2025-07-16 RX ADMIN — MAGNESIUM OXIDE TAB 400 MG (241.3 MG ELEMENTAL MG) 400 MG: 400 (241.3 MG) TAB at 17:27

## 2025-07-16 RX ADMIN — MAGNESIUM SULFATE HEPTAHYDRATE 2 G: 40 INJECTION, SOLUTION INTRAVENOUS at 02:07

## 2025-07-16 RX ADMIN — INSULIN GLARGINE 19 UNITS: 100 INJECTION, SOLUTION SUBCUTANEOUS at 21:07

## 2025-07-16 RX ADMIN — INSULIN LISPRO 2 UNITS: 100 INJECTION, SOLUTION INTRAVENOUS; SUBCUTANEOUS at 05:22

## 2025-07-16 RX ADMIN — FLUTICASONE FUROATE AND VILANTEROL TRIFENATATE 1 PUFF: 100; 25 POWDER RESPIRATORY (INHALATION) at 09:30

## 2025-07-16 RX ADMIN — CYCLOBENZAPRINE HYDROCHLORIDE 10 MG: 10 TABLET, FILM COATED ORAL at 09:28

## 2025-07-16 RX ADMIN — INSULIN GLARGINE 19 UNITS: 100 INJECTION, SOLUTION SUBCUTANEOUS at 09:37

## 2025-07-16 RX ADMIN — INSULIN LISPRO 6 UNITS: 100 INJECTION, SOLUTION INTRAVENOUS; SUBCUTANEOUS at 21:07

## 2025-07-16 RX ADMIN — SODIUM CHLORIDE 125 ML/HR: 0.9 INJECTION, SOLUTION INTRAVENOUS at 23:37

## 2025-07-16 RX ADMIN — DULOXETINE 60 MG: 60 CAPSULE, DELAYED RELEASE ORAL at 09:30

## 2025-07-16 RX ADMIN — NICOTINE 1 PATCH: 21 PATCH, EXTENDED RELEASE TRANSDERMAL at 09:29

## 2025-07-16 RX ADMIN — MAGNESIUM OXIDE TAB 400 MG (241.3 MG ELEMENTAL MG) 400 MG: 400 (241.3 MG) TAB at 09:29

## 2025-07-16 RX ADMIN — HEPARIN SODIUM 13.1 UNITS/KG/HR: 10000 INJECTION, SOLUTION INTRAVENOUS at 13:47

## 2025-07-16 RX ADMIN — INSULIN LISPRO 4 UNITS: 100 INJECTION, SOLUTION INTRAVENOUS; SUBCUTANEOUS at 16:39

## 2025-07-16 RX ADMIN — OXYCODONE HYDROCHLORIDE 10 MG: 10 TABLET ORAL at 09:28

## 2025-07-16 NOTE — UTILIZATION REVIEW
NOTIFICATION OF ADMISSION DISCHARGE   This is a Notification of Discharge from [facility]. Please be advised that this patient has been discharge from our facility. Below you will find the admission and discharge date and time including the patient’s disposition.   UTILIZATION REVIEW CONTACT:  Utilization Review Assistants  Network Utilization Review Department  Phone: [phone] x carefully listen to the prompts. All voicemails are confidential.  Email: [email or web address]     ADMISSION INFORMATION  PRESENTATION DATE: 7/12/2025 10:41 PM  OBERVATION ADMISSION DATE: 07/13/2025 0036  INPATIENT ADMISSION DATE: 7/13/25  3:17 PM   DISCHARGE DATE: 7/15/2025  7:11 PM   DISPOSITION:[facility] Utilization Review Department  ATTENTION: Please call with any questions or concerns to [phone] and carefully listen to the prompts so that you are directed to the right person. All voicemails are confidential.   For Discharge needs, contact Care Management DC Support Team at [phone] opt. 2  Send all requests for admission clinical reviews, approved or denied determinations and any other requests to dedicated fax number below belonging to the campus where the patient is receiving treatment. List of dedicated fax numbers for the Facilities:  FACILITY NAME UR FAX NUMBER   ADMISSION DENIALS (Administrative/Medical Necessity) [phone]   DISCHARGE SUPPORT TEAM (Health system) [phone]   PARENT CHILD HEALTH (Maternity/NICU/Pediatrics) [phone]   [facility] [phone]   [facility] [phone]   [facility] [phone]   [facility] [phone]   [facility] [phone]   [facility] [phone]   [facility] [phone]   [facility] [phone]    [facility] [phone]   [facility] [phone]   [facility] [phone]   [facility] [phone]

## 2025-07-17 LAB
ANION GAP SERPL CALCULATED.3IONS-SCNC: 5 MMOL/L (ref 4–13)
APTT PPP: 102 SECONDS (ref 23–34)
APTT PPP: 50 SECONDS (ref 23–34)
APTT PPP: 59 SECONDS (ref 23–34)
APTT PPP: 60 SECONDS (ref 23–34)
BUN SERPL-MCNC: 15 MG/DL (ref 5–25)
CALCIUM SERPL-MCNC: 8.1 MG/DL (ref 8.4–10.2)
CHLORIDE SERPL-SCNC: 104 MMOL/L (ref 96–108)
CO2 SERPL-SCNC: 27 MMOL/L (ref 21–32)
CREAT SERPL-MCNC: 0.42 MG/DL (ref 0.6–1.3)
ERYTHROCYTE [DISTWIDTH] IN BLOOD BY AUTOMATED COUNT: 15.3 % (ref 11.6–15.1)
GFR SERPL CREATININE-BSD FRML MDRD: 130 ML/MIN/1.73SQ M
GLUCOSE SERPL-MCNC: 156 MG/DL (ref 65–140)
GLUCOSE SERPL-MCNC: 251 MG/DL (ref 65–140)
GLUCOSE SERPL-MCNC: 380 MG/DL (ref 65–140)
GLUCOSE SERPL-MCNC: 71 MG/DL (ref 65–140)
GLUCOSE SERPL-MCNC: 97 MG/DL (ref 65–140)
HCT VFR BLD AUTO: 33.5 % (ref 34.8–46.1)
HGB BLD-MCNC: 11.6 G/DL (ref 11.5–15.4)
MCH RBC QN AUTO: 26 PG (ref 26.8–34.3)
MCHC RBC AUTO-ENTMCNC: 34.6 G/DL (ref 31.4–37.4)
MCV RBC AUTO: 75 FL (ref 82–98)
PLATELET # BLD AUTO: 325 THOUSANDS/UL (ref 149–390)
PMV BLD AUTO: 10.3 FL (ref 8.9–12.7)
POTASSIUM SERPL-SCNC: 3.9 MMOL/L (ref 3.5–5.3)
RBC # BLD AUTO: 4.46 MILLION/UL (ref 3.81–5.12)
SODIUM SERPL-SCNC: 136 MMOL/L (ref 135–147)
WBC # BLD AUTO: 8.06 THOUSAND/UL (ref 4.31–10.16)

## 2025-07-17 PROCEDURE — 85027 COMPLETE CBC AUTOMATED: CPT | Performed by: PHYSICIAN ASSISTANT

## 2025-07-17 PROCEDURE — 85730 THROMBOPLASTIN TIME PARTIAL: CPT | Performed by: FAMILY MEDICINE

## 2025-07-17 PROCEDURE — 82948 REAGENT STRIP/BLOOD GLUCOSE: CPT

## 2025-07-17 PROCEDURE — 99406 BEHAV CHNG SMOKING 3-10 MIN: CPT | Performed by: SURGERY

## 2025-07-17 PROCEDURE — 99232 SBSQ HOSP IP/OBS MODERATE 35: CPT | Performed by: SURGERY

## 2025-07-17 PROCEDURE — 80048 BASIC METABOLIC PNL TOTAL CA: CPT | Performed by: PHYSICIAN ASSISTANT

## 2025-07-17 PROCEDURE — 99232 SBSQ HOSP IP/OBS MODERATE 35: CPT | Performed by: FAMILY MEDICINE

## 2025-07-17 RX ORDER — INSULIN LISPRO 100 [IU]/ML
1-6 INJECTION, SOLUTION INTRAVENOUS; SUBCUTANEOUS EVERY 6 HOURS SCHEDULED
Status: DISCONTINUED | OUTPATIENT
Start: 2025-07-18 | End: 2025-07-18

## 2025-07-17 RX ORDER — INSULIN LISPRO 100 [IU]/ML
4 INJECTION, SOLUTION INTRAVENOUS; SUBCUTANEOUS ONCE
Status: COMPLETED | OUTPATIENT
Start: 2025-07-17 | End: 2025-07-17

## 2025-07-17 RX ORDER — INSULIN LISPRO 100 [IU]/ML
1-6 INJECTION, SOLUTION INTRAVENOUS; SUBCUTANEOUS EVERY 6 HOURS
Status: DISCONTINUED | OUTPATIENT
Start: 2025-07-18 | End: 2025-07-17

## 2025-07-17 RX ORDER — INSULIN LISPRO 100 [IU]/ML
1-6 INJECTION, SOLUTION INTRAVENOUS; SUBCUTANEOUS
Status: DISCONTINUED | OUTPATIENT
Start: 2025-07-17 | End: 2025-07-17

## 2025-07-17 RX ADMIN — HEPARIN SODIUM 2000 UNITS: 1000 INJECTION, SOLUTION INTRAVENOUS; SUBCUTANEOUS at 15:57

## 2025-07-17 RX ADMIN — ATORVASTATIN CALCIUM 80 MG: 80 TABLET, FILM COATED ORAL at 16:04

## 2025-07-17 RX ADMIN — CYCLOBENZAPRINE HYDROCHLORIDE 10 MG: 10 TABLET, FILM COATED ORAL at 16:04

## 2025-07-17 RX ADMIN — INSULIN GLARGINE 19 UNITS: 100 INJECTION, SOLUTION SUBCUTANEOUS at 21:29

## 2025-07-17 RX ADMIN — GABAPENTIN 600 MG: 300 CAPSULE ORAL at 16:03

## 2025-07-17 RX ADMIN — MORPHINE SULFATE 2 MG: 2 INJECTION, SOLUTION INTRAMUSCULAR; INTRAVENOUS at 22:42

## 2025-07-17 RX ADMIN — PRAZOSIN HYDROCHLORIDE 1 MG: 1 CAPSULE ORAL at 21:34

## 2025-07-17 RX ADMIN — HEPARIN SODIUM 2000 UNITS: 1000 INJECTION, SOLUTION INTRAVENOUS; SUBCUTANEOUS at 01:37

## 2025-07-17 RX ADMIN — MAGNESIUM OXIDE TAB 400 MG (241.3 MG ELEMENTAL MG) 400 MG: 400 (241.3 MG) TAB at 18:23

## 2025-07-17 RX ADMIN — MORPHINE SULFATE 2 MG: 2 INJECTION, SOLUTION INTRAMUSCULAR; INTRAVENOUS at 08:36

## 2025-07-17 RX ADMIN — CYCLOBENZAPRINE HYDROCHLORIDE 10 MG: 10 TABLET, FILM COATED ORAL at 21:29

## 2025-07-17 RX ADMIN — HEPARIN SODIUM 12.1 UNITS/KG/HR: 10000 INJECTION, SOLUTION INTRAVENOUS at 13:11

## 2025-07-17 RX ADMIN — ACETAMINOPHEN 975 MG: 325 TABLET ORAL at 13:12

## 2025-07-17 RX ADMIN — ASPIRIN 81 MG CHEWABLE TABLET 81 MG: 81 TABLET CHEWABLE at 08:35

## 2025-07-17 RX ADMIN — MAGNESIUM OXIDE TAB 400 MG (241.3 MG ELEMENTAL MG) 400 MG: 400 (241.3 MG) TAB at 08:35

## 2025-07-17 RX ADMIN — NICOTINE 1 PATCH: 21 PATCH, EXTENDED RELEASE TRANSDERMAL at 08:40

## 2025-07-17 RX ADMIN — MORPHINE SULFATE 2 MG: 2 INJECTION, SOLUTION INTRAMUSCULAR; INTRAVENOUS at 18:23

## 2025-07-17 RX ADMIN — CYCLOBENZAPRINE HYDROCHLORIDE 10 MG: 10 TABLET, FILM COATED ORAL at 08:35

## 2025-07-17 RX ADMIN — INSULIN LISPRO 4 UNITS: 100 INJECTION, SOLUTION INTRAVENOUS; SUBCUTANEOUS at 00:23

## 2025-07-17 RX ADMIN — SODIUM CHLORIDE 125 ML/HR: 0.9 INJECTION, SOLUTION INTRAVENOUS at 09:23

## 2025-07-17 RX ADMIN — ACETAMINOPHEN 975 MG: 325 TABLET ORAL at 21:29

## 2025-07-17 RX ADMIN — GABAPENTIN 600 MG: 300 CAPSULE ORAL at 08:35

## 2025-07-17 RX ADMIN — INSULIN LISPRO 6 UNITS: 100 INJECTION, SOLUTION INTRAVENOUS; SUBCUTANEOUS at 22:29

## 2025-07-17 RX ADMIN — OXYCODONE HYDROCHLORIDE 10 MG: 10 TABLET ORAL at 21:29

## 2025-07-17 RX ADMIN — GABAPENTIN 600 MG: 300 CAPSULE ORAL at 21:29

## 2025-07-17 RX ADMIN — OXYCODONE HYDROCHLORIDE 10 MG: 10 TABLET ORAL at 16:51

## 2025-07-17 RX ADMIN — INSULIN LISPRO 1 UNITS: 100 INJECTION, SOLUTION INTRAVENOUS; SUBCUTANEOUS at 06:05

## 2025-07-17 RX ADMIN — OXYCODONE HYDROCHLORIDE 10 MG: 10 TABLET ORAL at 06:08

## 2025-07-17 RX ADMIN — ARIPIPRAZOLE 5 MG: 5 TABLET ORAL at 08:38

## 2025-07-17 RX ADMIN — ACETAMINOPHEN 975 MG: 325 TABLET ORAL at 06:03

## 2025-07-17 RX ADMIN — DULOXETINE 60 MG: 60 CAPSULE, DELAYED RELEASE ORAL at 08:35

## 2025-07-17 RX ADMIN — INSULIN LISPRO 3 UNITS: 100 INJECTION, SOLUTION INTRAVENOUS; SUBCUTANEOUS at 16:09

## 2025-07-17 NOTE — ANESTHESIA PREPROCEDURE EVALUATION
Procedure:  PRE-OP ONLY    Relevant Problems   CARDIO   (+) Essential hypertension   (+) Mixed hyperlipidemia   (+) PAD (peripheral artery disease) with right lower extremity pain      ENDO   (+) Insulin dependent diabetes mellitus (HCC)      HEMATOLOGY   (+) Postoperative blood loss anemia      MUSCULOSKELETAL   (+) Chronic bilateral low back pain without sciatica      NEURO/PSYCH   (+) Chronic bilateral low back pain without sciatica   (+) Chronic pain of left upper extremity   (+) Diplopia      PULMONARY   (+) COPD (chronic obstructive pulmonary disease)/asthma   (+) Moderate persistent asthma without complication   (+) Obstructive sleep apnea      Behavioral Health   (+) History of substance use   (+) Tobacco abuse      Neurology/Sleep   (+) History of CVA (cerebrovascular accident)      Care Coordination   (+) S/P AKA (above knee amputation) unilateral, left (HCC)      Right lower extremity peripheral artery disease now with ischemic leg pains at rest.  Patient underwent previous angiogram roughly 6 weeks ago.  New vascular study of the leg is indicating occluded right SFA proximal middle and distal.  IR has been consulted regarding possibility of right lower extremity angiography with intervention.     ECHO 5/2025:  Left Ventricle: Left ventricular cavity size is normal. Wall thickness is increased. There is mild concentric hypertrophy. The left ventricular ejection fraction is 60%. Systolic function is normal. Wall motion is normal. Diastolic function is normal.    Atrial Septum: No patent foramen ovale detected at rest using color doppler and using agitated saline contrast, or by provocation with cough and valsalva using agitated saline contrast.    Mitral Valve: There is mild regurgitation.       Anesthesia Plan  ASA Score- 3     Anesthesia Type- general with ASA Monitors.         Additional Monitors:     Airway Plan: LMA and LMA.           Plan Factors-    Chart reviewed. EKG reviewed.  Existing labs  reviewed. Patient summary reviewed.    Patient is a current smoker.              Induction- intravenous.    Postoperative Plan- .   Monitoring Plan - Monitoring plan - standard ASA monitoring      Perioperative Resuscitation Plan - Level 1 - Full Code.       Informed Consent-       NPO Status:  No vitals data found for the desired time range.

## 2025-07-18 ENCOUNTER — APPOINTMENT (INPATIENT)
Dept: RADIOLOGY | Facility: HOSPITAL | Age: 40
DRG: 253 | End: 2025-07-18
Attending: PHYSICIAN ASSISTANT
Payer: COMMERCIAL

## 2025-07-18 ENCOUNTER — ANESTHESIA EVENT (INPATIENT)
Dept: RADIOLOGY | Facility: HOSPITAL | Age: 40
End: 2025-07-18
Payer: COMMERCIAL

## 2025-07-18 ENCOUNTER — ANESTHESIA (INPATIENT)
Dept: RADIOLOGY | Facility: HOSPITAL | Age: 40
End: 2025-07-18
Payer: COMMERCIAL

## 2025-07-18 LAB
ANION GAP SERPL CALCULATED.3IONS-SCNC: 6 MMOL/L (ref 4–13)
APTT PPP: 62 SECONDS (ref 23–34)
APTT PPP: 64 SECONDS (ref 23–34)
BASOPHILS # BLD AUTO: 0.03 THOUSANDS/ÂΜL (ref 0–0.1)
BASOPHILS NFR BLD AUTO: 0 % (ref 0–1)
BUN SERPL-MCNC: 15 MG/DL (ref 5–25)
CALCIUM SERPL-MCNC: 8.4 MG/DL (ref 8.4–10.2)
CHLORIDE SERPL-SCNC: 101 MMOL/L (ref 96–108)
CO2 SERPL-SCNC: 27 MMOL/L (ref 21–32)
CREAT SERPL-MCNC: 0.48 MG/DL (ref 0.6–1.3)
EOSINOPHIL # BLD AUTO: 0.26 THOUSAND/ÂΜL (ref 0–0.61)
EOSINOPHIL NFR BLD AUTO: 4 % (ref 0–6)
ERYTHROCYTE [DISTWIDTH] IN BLOOD BY AUTOMATED COUNT: 15.4 % (ref 11.6–15.1)
GFR SERPL CREATININE-BSD FRML MDRD: 124 ML/MIN/1.73SQ M
GLUCOSE SERPL-MCNC: 109 MG/DL (ref 65–140)
GLUCOSE SERPL-MCNC: 186 MG/DL (ref 65–140)
GLUCOSE SERPL-MCNC: 199 MG/DL (ref 65–140)
GLUCOSE SERPL-MCNC: 220 MG/DL (ref 65–140)
GLUCOSE SERPL-MCNC: 375 MG/DL (ref 65–140)
GLUCOSE SERPL-MCNC: 558 MG/DL (ref 65–140)
GLUCOSE SERPL-MCNC: 81 MG/DL (ref 65–140)
HCT VFR BLD AUTO: 34.7 % (ref 34.8–46.1)
HGB BLD-MCNC: 12 G/DL (ref 11.5–15.4)
IMM GRANULOCYTES # BLD AUTO: 0.05 THOUSAND/UL (ref 0–0.2)
IMM GRANULOCYTES NFR BLD AUTO: 1 % (ref 0–2)
LYMPHOCYTES # BLD AUTO: 3.27 THOUSANDS/ÂΜL (ref 0.6–4.47)
LYMPHOCYTES NFR BLD AUTO: 44 % (ref 14–44)
MCH RBC QN AUTO: 26.3 PG (ref 26.8–34.3)
MCHC RBC AUTO-ENTMCNC: 34.6 G/DL (ref 31.4–37.4)
MCV RBC AUTO: 76 FL (ref 82–98)
MONOCYTES # BLD AUTO: 0.42 THOUSAND/ÂΜL (ref 0.17–1.22)
MONOCYTES NFR BLD AUTO: 6 % (ref 4–12)
NEUTROPHILS # BLD AUTO: 3.34 THOUSANDS/ÂΜL (ref 1.85–7.62)
NEUTS SEG NFR BLD AUTO: 45 % (ref 43–75)
NRBC BLD AUTO-RTO: 0 /100 WBCS
PLATELET # BLD AUTO: 349 THOUSANDS/UL (ref 149–390)
PMV BLD AUTO: 10.5 FL (ref 8.9–12.7)
POTASSIUM SERPL-SCNC: 4.4 MMOL/L (ref 3.5–5.3)
RBC # BLD AUTO: 4.57 MILLION/UL (ref 3.81–5.12)
SODIUM SERPL-SCNC: 134 MMOL/L (ref 135–147)
WBC # BLD AUTO: 7.37 THOUSAND/UL (ref 4.31–10.16)

## 2025-07-18 PROCEDURE — 75710 ARTERY X-RAYS ARM/LEG: CPT | Performed by: STUDENT IN AN ORGANIZED HEALTH CARE EDUCATION/TRAINING PROGRAM

## 2025-07-18 PROCEDURE — 85730 THROMBOPLASTIN TIME PARTIAL: CPT

## 2025-07-18 PROCEDURE — C1894 INTRO/SHEATH, NON-LASER: HCPCS | Performed by: STUDENT IN AN ORGANIZED HEALTH CARE EDUCATION/TRAINING PROGRAM

## 2025-07-18 PROCEDURE — C1887 CATHETER, GUIDING: HCPCS | Performed by: STUDENT IN AN ORGANIZED HEALTH CARE EDUCATION/TRAINING PROGRAM

## 2025-07-18 PROCEDURE — 99232 SBSQ HOSP IP/OBS MODERATE 35: CPT

## 2025-07-18 PROCEDURE — 36140 INTRO NDL ICATH UPR/LXTR ART: CPT

## 2025-07-18 PROCEDURE — 85025 COMPLETE CBC W/AUTO DIFF WBC: CPT | Performed by: FAMILY MEDICINE

## 2025-07-18 PROCEDURE — 80048 BASIC METABOLIC PNL TOTAL CA: CPT | Performed by: FAMILY MEDICINE

## 2025-07-18 PROCEDURE — 85730 THROMBOPLASTIN TIME PARTIAL: CPT | Performed by: FAMILY MEDICINE

## 2025-07-18 PROCEDURE — B41DYZZ FLUOROSCOPY OF AORTA AND BILATERAL LOWER EXTREMITY ARTERIES USING OTHER CONTRAST: ICD-10-PCS | Performed by: STUDENT IN AN ORGANIZED HEALTH CARE EDUCATION/TRAINING PROGRAM

## 2025-07-18 PROCEDURE — C1769 GUIDE WIRE: HCPCS | Performed by: STUDENT IN AN ORGANIZED HEALTH CARE EDUCATION/TRAINING PROGRAM

## 2025-07-18 PROCEDURE — 36200 PLACE CATHETER IN AORTA: CPT | Performed by: STUDENT IN AN ORGANIZED HEALTH CARE EDUCATION/TRAINING PROGRAM

## 2025-07-18 PROCEDURE — 82948 REAGENT STRIP/BLOOD GLUCOSE: CPT

## 2025-07-18 PROCEDURE — 76937 US GUIDE VASCULAR ACCESS: CPT | Performed by: STUDENT IN AN ORGANIZED HEALTH CARE EDUCATION/TRAINING PROGRAM

## 2025-07-18 RX ORDER — ONDANSETRON 2 MG/ML
4 INJECTION INTRAMUSCULAR; INTRAVENOUS ONCE AS NEEDED
Status: DISCONTINUED | OUTPATIENT
Start: 2025-07-18 | End: 2025-07-19 | Stop reason: HOSPADM

## 2025-07-18 RX ORDER — ONDANSETRON 2 MG/ML
INJECTION INTRAMUSCULAR; INTRAVENOUS AS NEEDED
Status: DISCONTINUED | OUTPATIENT
Start: 2025-07-18 | End: 2025-07-18

## 2025-07-18 RX ORDER — INSULIN GLARGINE 100 [IU]/ML
30 INJECTION, SOLUTION SUBCUTANEOUS EVERY 12 HOURS SCHEDULED
Status: DISCONTINUED | OUTPATIENT
Start: 2025-07-18 | End: 2025-07-19

## 2025-07-18 RX ORDER — ALBUTEROL SULFATE 0.83 MG/ML
2.5 SOLUTION RESPIRATORY (INHALATION) ONCE AS NEEDED
Status: DISCONTINUED | OUTPATIENT
Start: 2025-07-18 | End: 2025-07-18

## 2025-07-18 RX ORDER — HYDROMORPHONE HCL/PF 1 MG/ML
1 SYRINGE (ML) INJECTION ONCE
Status: COMPLETED | OUTPATIENT
Start: 2025-07-18 | End: 2025-07-18

## 2025-07-18 RX ORDER — LIDOCAINE HYDROCHLORIDE 10 MG/ML
INJECTION, SOLUTION EPIDURAL; INFILTRATION; INTRACAUDAL; PERINEURAL AS NEEDED
Status: DISCONTINUED | OUTPATIENT
Start: 2025-07-18 | End: 2025-07-18

## 2025-07-18 RX ORDER — SODIUM CHLORIDE 9 MG/ML
100 INJECTION, SOLUTION INTRAVENOUS CONTINUOUS
Status: DISPENSED | OUTPATIENT
Start: 2025-07-18 | End: 2025-07-18

## 2025-07-18 RX ORDER — DEXAMETHASONE SODIUM PHOSPHATE 10 MG/ML
INJECTION, SOLUTION INTRAMUSCULAR; INTRAVENOUS AS NEEDED
Status: DISCONTINUED | OUTPATIENT
Start: 2025-07-18 | End: 2025-07-18

## 2025-07-18 RX ORDER — INSULIN LISPRO 100 [IU]/ML
10 INJECTION, SOLUTION INTRAVENOUS; SUBCUTANEOUS ONCE
Status: COMPLETED | OUTPATIENT
Start: 2025-07-18 | End: 2025-07-19

## 2025-07-18 RX ORDER — HYDROMORPHONE HCL IN WATER/PF 6 MG/30 ML
0.2 PATIENT CONTROLLED ANALGESIA SYRINGE INTRAVENOUS
Status: DISCONTINUED | OUTPATIENT
Start: 2025-07-18 | End: 2025-07-19 | Stop reason: HOSPADM

## 2025-07-18 RX ORDER — NIFEDIPINE 10 MG/1
10 CAPSULE ORAL ONCE
Status: DISCONTINUED | OUTPATIENT
Start: 2025-07-18 | End: 2025-07-18

## 2025-07-18 RX ORDER — LIDOCAINE WITH 8.4% SOD BICARB 0.9%(10ML)
SYRINGE (ML) INJECTION AS NEEDED
Status: COMPLETED | OUTPATIENT
Start: 2025-07-18 | End: 2025-07-18

## 2025-07-18 RX ORDER — PROPOFOL 10 MG/ML
INJECTION, EMULSION INTRAVENOUS AS NEEDED
Status: DISCONTINUED | OUTPATIENT
Start: 2025-07-18 | End: 2025-07-18

## 2025-07-18 RX ORDER — FENTANYL CITRATE 50 UG/ML
INJECTION, SOLUTION INTRAMUSCULAR; INTRAVENOUS AS NEEDED
Status: DISCONTINUED | OUTPATIENT
Start: 2025-07-18 | End: 2025-07-18

## 2025-07-18 RX ORDER — MIDAZOLAM HYDROCHLORIDE 2 MG/2ML
INJECTION, SOLUTION INTRAMUSCULAR; INTRAVENOUS AS NEEDED
Status: DISCONTINUED | OUTPATIENT
Start: 2025-07-18 | End: 2025-07-18

## 2025-07-18 RX ORDER — FENTANYL CITRATE/PF 50 MCG/ML
25 SYRINGE (ML) INJECTION
Refills: 0 | Status: COMPLETED | OUTPATIENT
Start: 2025-07-18 | End: 2025-07-18

## 2025-07-18 RX ORDER — SODIUM CHLORIDE, SODIUM GLUCONATE, SODIUM ACETATE, POTASSIUM CHLORIDE, MAGNESIUM CHLORIDE, SODIUM PHOSPHATE, DIBASIC, AND POTASSIUM PHOSPHATE .53; .5; .37; .037; .03; .012; .00082 G/100ML; G/100ML; G/100ML; G/100ML; G/100ML; G/100ML; G/100ML
75 INJECTION, SOLUTION INTRAVENOUS CONTINUOUS
Status: DISCONTINUED | OUTPATIENT
Start: 2025-07-18 | End: 2025-07-18

## 2025-07-18 RX ORDER — INSULIN LISPRO 100 [IU]/ML
1-6 INJECTION, SOLUTION INTRAVENOUS; SUBCUTANEOUS
Status: DISCONTINUED | OUTPATIENT
Start: 2025-07-18 | End: 2025-07-19

## 2025-07-18 RX ORDER — IODIXANOL 320 MG/ML
300 INJECTION, SOLUTION INTRAVASCULAR
Status: COMPLETED | OUTPATIENT
Start: 2025-07-18 | End: 2025-07-18

## 2025-07-18 RX ORDER — SODIUM CHLORIDE, SODIUM GLUCONATE, SODIUM ACETATE, POTASSIUM CHLORIDE, MAGNESIUM CHLORIDE, SODIUM PHOSPHATE, DIBASIC, AND POTASSIUM PHOSPHATE .53; .5; .37; .037; .03; .012; .00082 G/100ML; G/100ML; G/100ML; G/100ML; G/100ML; G/100ML; G/100ML
75 INJECTION, SOLUTION INTRAVENOUS CONTINUOUS
Status: DISCONTINUED | OUTPATIENT
Start: 2025-07-19 | End: 2025-07-19

## 2025-07-18 RX ORDER — HYDROMORPHONE HCL/PF 1 MG/ML
0.5 SYRINGE (ML) INJECTION
Status: DISCONTINUED | OUTPATIENT
Start: 2025-07-18 | End: 2025-07-26

## 2025-07-18 RX ADMIN — CYCLOBENZAPRINE HYDROCHLORIDE 10 MG: 10 TABLET, FILM COATED ORAL at 19:49

## 2025-07-18 RX ADMIN — INSULIN GLARGINE 30 UNITS: 100 INJECTION, SOLUTION SUBCUTANEOUS at 22:03

## 2025-07-18 RX ADMIN — FENTANYL CITRATE 25 MCG: 50 INJECTION INTRAMUSCULAR; INTRAVENOUS at 19:03

## 2025-07-18 RX ADMIN — MIDAZOLAM 2 MG: 1 INJECTION INTRAMUSCULAR; INTRAVENOUS at 15:34

## 2025-07-18 RX ADMIN — DEXAMETHASONE SODIUM PHOSPHATE 5 MG: 10 INJECTION, SOLUTION INTRAMUSCULAR; INTRAVENOUS at 15:51

## 2025-07-18 RX ADMIN — INSULIN LISPRO 2 UNITS: 100 INJECTION, SOLUTION INTRAVENOUS; SUBCUTANEOUS at 06:31

## 2025-07-18 RX ADMIN — FENTANYL CITRATE 25 MCG: 50 INJECTION INTRAMUSCULAR; INTRAVENOUS at 18:39

## 2025-07-18 RX ADMIN — ACETAMINOPHEN 975 MG: 325 TABLET ORAL at 04:35

## 2025-07-18 RX ADMIN — SODIUM CHLORIDE: 0.9 INJECTION, SOLUTION INTRAVENOUS at 17:59

## 2025-07-18 RX ADMIN — LIDOCAINE HYDROCHLORIDE 50 MG: 10 INJECTION, SOLUTION EPIDURAL; INFILTRATION; INTRACAUDAL; PERINEURAL at 15:48

## 2025-07-18 RX ADMIN — NICOTINE 1 PATCH: 21 PATCH, EXTENDED RELEASE TRANSDERMAL at 09:27

## 2025-07-18 RX ADMIN — SENNOSIDES, DOCUSATE SODIUM 1 TABLET: 50; 8.6 TABLET, FILM COATED ORAL at 22:02

## 2025-07-18 RX ADMIN — INSULIN LISPRO 1 UNITS: 100 INJECTION, SOLUTION INTRAVENOUS; SUBCUTANEOUS at 22:03

## 2025-07-18 RX ADMIN — CYCLOBENZAPRINE HYDROCHLORIDE 10 MG: 10 TABLET, FILM COATED ORAL at 09:26

## 2025-07-18 RX ADMIN — HYDROMORPHONE HYDROCHLORIDE 1 MG: 1 INJECTION, SOLUTION INTRAMUSCULAR; INTRAVENOUS; SUBCUTANEOUS at 01:59

## 2025-07-18 RX ADMIN — FENTANYL CITRATE 25 MCG: 50 INJECTION INTRAMUSCULAR; INTRAVENOUS at 19:01

## 2025-07-18 RX ADMIN — IODIXANOL 49 ML: 320 INJECTION, SOLUTION INTRAVASCULAR at 17:59

## 2025-07-18 RX ADMIN — OXYCODONE HYDROCHLORIDE 10 MG: 10 TABLET ORAL at 04:20

## 2025-07-18 RX ADMIN — GABAPENTIN 600 MG: 300 CAPSULE ORAL at 23:22

## 2025-07-18 RX ADMIN — FENTANYL CITRATE 25 MCG: 50 INJECTION INTRAMUSCULAR; INTRAVENOUS at 18:42

## 2025-07-18 RX ADMIN — HYDROMORPHONE HYDROCHLORIDE 0.5 MG: 1 INJECTION, SOLUTION INTRAMUSCULAR; INTRAVENOUS; SUBCUTANEOUS at 22:04

## 2025-07-18 RX ADMIN — INSULIN LISPRO 6 UNITS: 100 INJECTION, SOLUTION INTRAVENOUS; SUBCUTANEOUS at 00:50

## 2025-07-18 RX ADMIN — HEPARIN SODIUM 14.1 UNITS/KG/HR: 10000 INJECTION, SOLUTION INTRAVENOUS at 11:18

## 2025-07-18 RX ADMIN — MORPHINE SULFATE 2 MG: 2 INJECTION, SOLUTION INTRAMUSCULAR; INTRAVENOUS at 01:37

## 2025-07-18 RX ADMIN — ARIPIPRAZOLE 5 MG: 5 TABLET ORAL at 09:27

## 2025-07-18 RX ADMIN — SODIUM CHLORIDE 100 ML/HR: 0.9 INJECTION, SOLUTION INTRAVENOUS at 09:53

## 2025-07-18 RX ADMIN — CYCLOBENZAPRINE HYDROCHLORIDE 10 MG: 10 TABLET, FILM COATED ORAL at 23:23

## 2025-07-18 RX ADMIN — FENTANYL CITRATE 75 MCG: 50 INJECTION INTRAMUSCULAR; INTRAVENOUS at 17:59

## 2025-07-18 RX ADMIN — PROPOFOL 200 MG: 10 INJECTION, EMULSION INTRAVENOUS at 15:48

## 2025-07-18 RX ADMIN — MORPHINE SULFATE 2 MG: 2 INJECTION, SOLUTION INTRAMUSCULAR; INTRAVENOUS at 06:31

## 2025-07-18 RX ADMIN — DULOXETINE 60 MG: 60 CAPSULE, DELAYED RELEASE ORAL at 09:26

## 2025-07-18 RX ADMIN — ONDANSETRON 4 MG: 2 INJECTION INTRAMUSCULAR; INTRAVENOUS at 15:51

## 2025-07-18 RX ADMIN — PRAZOSIN HYDROCHLORIDE 1 MG: 1 CAPSULE ORAL at 22:10

## 2025-07-18 RX ADMIN — Medication 10 ML: at 16:10

## 2025-07-18 RX ADMIN — FENTANYL CITRATE 25 MCG: 50 INJECTION INTRAMUSCULAR; INTRAVENOUS at 15:48

## 2025-07-18 RX ADMIN — ACETAMINOPHEN 975 MG: 325 TABLET ORAL at 22:02

## 2025-07-18 RX ADMIN — MAGNESIUM OXIDE TAB 400 MG (241.3 MG ELEMENTAL MG) 400 MG: 400 (241.3 MG) TAB at 19:49

## 2025-07-18 RX ADMIN — ASPIRIN 81 MG CHEWABLE TABLET 81 MG: 81 TABLET CHEWABLE at 09:25

## 2025-07-18 RX ADMIN — HYDROMORPHONE HYDROCHLORIDE 0.5 MG: 1 INJECTION, SOLUTION INTRAMUSCULAR; INTRAVENOUS; SUBCUTANEOUS at 10:54

## 2025-07-18 RX ADMIN — OXYCODONE HYDROCHLORIDE 10 MG: 10 TABLET ORAL at 09:50

## 2025-07-18 RX ADMIN — MAGNESIUM OXIDE TAB 400 MG (241.3 MG ELEMENTAL MG) 400 MG: 400 (241.3 MG) TAB at 09:26

## 2025-07-18 RX ADMIN — GABAPENTIN 600 MG: 300 CAPSULE ORAL at 19:49

## 2025-07-18 RX ADMIN — GABAPENTIN 600 MG: 300 CAPSULE ORAL at 09:26

## 2025-07-18 RX ADMIN — ATORVASTATIN CALCIUM 80 MG: 80 TABLET, FILM COATED ORAL at 19:49

## 2025-07-18 RX ADMIN — OXYCODONE HYDROCHLORIDE 10 MG: 10 TABLET ORAL at 20:25

## 2025-07-18 NOTE — ANESTHESIA PREPROCEDURE EVALUATION
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, left leg (HCC)   (+) Essential hypertension   (+) Hypertensive urgency   (+) Mixed hyperlipidemia   (+) PAD (peripheral artery disease) with right lower extremity pain   (+) Superficial femoral artery occlusion (HCC)      ENDO   (+) Insulin dependent diabetes mellitus (HCC)   (+) Type 2 diabetes mellitus with diabetic polyneuropathy, with long-term current use of insulin (HCC)      HEMATOLOGY   (+) Iron deficiency anemia   (+) Postoperative blood loss anemia      MUSCULOSKELETAL   (+) Chronic bilateral low back pain without sciatica      NEURO/PSYCH   (+) Chronic bilateral low back pain without sciatica   (+) Chronic pain of left upper extremity   (+) Diabetic autonomic neuropathy associated with type 2 diabetes mellitus (HCC)   (+) Diabetic neuropathy (HCC)   (+) Diplopia   (+) Paresthesia   (+) Type 2 diabetes mellitus with diabetic polyneuropathy, with long-term current use of insulin (HCC)   (+) Uncontrolled type 2 diabetes mellitus with diabetic polyneuropathy, without long-term current use of insulin      PULMONARY   (+) COPD (chronic obstructive pulmonary disease)/asthma   (+) Moderate persistent asthma without complication   (+) Obstructive sleep apnea        Physical Exam    Airway     Mallampati score: III  TM Distance: >3 FB  Neck ROM: full      Cardiovascular      Dental       Pulmonary      Neurological      Other Findings  post-pubertal.      Anesthesia Plan  ASA Score- 3     Anesthesia Type- general with ASA Monitors.         Additional Monitors:     Airway Plan: LMA and LMA.    Comment: General anesthesia, LMA; standard ASA monitors. Risks and benefits discussed with patient; patient consented and agrees to proceed.    I saw and evaluated the patient. If seen with CRNA, we have discussed the anesthetic plan and I am in agreement that the  plan is appropriate for the patient.  Denies any chance of pregnancy ( to a woman, tubal ligation) - refuses pregnancy test, risks explained, form signed.       Plan Factors-    Chart reviewed.   Existing labs reviewed.                   Induction-     Postoperative Plan- .   Monitoring Plan - Monitoring plan - standard ASA monitoring      Perioperative Resuscitation Plan - Level 1 - Full Code.       Informed Consent- Anesthetic plan and risks discussed with patient.  I personally reviewed this patient with the CRNA. Discussed and agreed on the Anesthesia Plan with the CRNA..      NPO Status:  Vitals Value Taken Time   Date of last liquid 07/17/25 07/17/25 23:00   Time of last liquid     Date of last solid     Time of last solid

## 2025-07-18 NOTE — ANESTHESIA POSTPROCEDURE EVALUATION
Post-Op Assessment Note    CV Status:  Stable    Pain management: adequate       Mental Status:  Sleepy   Hydration Status:  Stable   PONV Controlled:  Controlled   Airway Patency:  Patent     Post Op Vitals Reviewed: Yes    No anethesia notable event occurred.    Staff: Anesthesiologist, CRNA           Last Filed PACU Vitals:  Vitals Value Taken Time   Temp 97.4 °F (36.3 °C) 07/18/25 18:07   Pulse 105 07/18/25 18:11   /100 07/18/25 18:11   Resp 18 07/18/25 18:11   SpO2 96 % 07/18/25 18:11   Vitals shown include unfiled device data.

## 2025-07-19 PROBLEM — Z01.810 PREOPERATIVE CARDIOVASCULAR EXAMINATION: Status: ACTIVE | Noted: 2025-07-19

## 2025-07-19 PROBLEM — Z59.819 HOUSING INSTABILITY: Status: ACTIVE | Noted: 2025-07-19

## 2025-07-19 PROBLEM — Z91.199 NONCOMPLIANCE: Chronic | Status: ACTIVE | Noted: 2025-07-19

## 2025-07-19 LAB
ANION GAP SERPL CALCULATED.3IONS-SCNC: 11 MMOL/L (ref 4–13)
ANION GAP SERPL CALCULATED.3IONS-SCNC: 8 MMOL/L (ref 4–13)
APTT PPP: 106 SECONDS (ref 23–34)
APTT PPP: 57 SECONDS (ref 23–34)
APTT PPP: 67 SECONDS (ref 23–34)
ARTERIAL PATENCY WRIST A: NO
B-OH-BUTYR SERPL-MCNC: 0.16 MMOL/L (ref 0.2–0.6)
BASE EX.OXY STD BLDV CALC-SCNC: 65.3 % (ref 60–80)
BASE EX.OXY STD BLDV CALC-SCNC: 66.3 % (ref 60–80)
BASE EXCESS BLDV CALC-SCNC: -2.8 MMOL/L
BASE EXCESS BLDV CALC-SCNC: -4.8 MMOL/L
BUN SERPL-MCNC: 15 MG/DL (ref 5–25)
BUN SERPL-MCNC: 15 MG/DL (ref 5–25)
CALCIUM SERPL-MCNC: 7.8 MG/DL (ref 8.4–10.2)
CALCIUM SERPL-MCNC: 8.2 MG/DL (ref 8.4–10.2)
CHLORIDE SERPL-SCNC: 100 MMOL/L (ref 96–108)
CHLORIDE SERPL-SCNC: 100 MMOL/L (ref 96–108)
CO2 SERPL-SCNC: 23 MMOL/L (ref 21–32)
CO2 SERPL-SCNC: 24 MMOL/L (ref 21–32)
CREAT SERPL-MCNC: 0.62 MG/DL (ref 0.6–1.3)
CREAT SERPL-MCNC: 0.66 MG/DL (ref 0.6–1.3)
ERYTHROCYTE [DISTWIDTH] IN BLOOD BY AUTOMATED COUNT: 15.3 % (ref 11.6–15.1)
GFR SERPL CREATININE-BSD FRML MDRD: 112 ML/MIN/1.73SQ M
GFR SERPL CREATININE-BSD FRML MDRD: 114 ML/MIN/1.73SQ M
GLUCOSE SERPL-MCNC: 293 MG/DL (ref 65–140)
GLUCOSE SERPL-MCNC: 375 MG/DL (ref 65–140)
GLUCOSE SERPL-MCNC: 379 MG/DL (ref 65–140)
GLUCOSE SERPL-MCNC: 380 MG/DL (ref 65–140)
GLUCOSE SERPL-MCNC: 382 MG/DL (ref 65–140)
GLUCOSE SERPL-MCNC: 432 MG/DL (ref 65–140)
GLUCOSE SERPL-MCNC: 439 MG/DL (ref 65–140)
GLUCOSE SERPL-MCNC: 519 MG/DL (ref 65–140)
GLUCOSE SERPL-MCNC: 539 MG/DL (ref 65–140)
HCO3 BLDV-SCNC: 22.1 MMOL/L (ref 24–30)
HCO3 BLDV-SCNC: 24 MMOL/L (ref 24–30)
HCT VFR BLD AUTO: 34.8 % (ref 34.8–46.1)
HGB BLD-MCNC: 12 G/DL (ref 11.5–15.4)
MAGNESIUM SERPL-MCNC: 1.8 MG/DL (ref 1.9–2.7)
MCH RBC QN AUTO: 25.8 PG (ref 26.8–34.3)
MCHC RBC AUTO-ENTMCNC: 34.5 G/DL (ref 31.4–37.4)
MCV RBC AUTO: 75 FL (ref 82–98)
O2 CT BLDV-SCNC: 11.9 ML/DL
O2 CT BLDV-SCNC: 12.2 ML/DL
PCO2 BLDV: 47.8 MM HG (ref 42–50)
PCO2 BLDV: 49.9 MM HG (ref 42–50)
PH BLDV: 7.28 [PH] (ref 7.3–7.4)
PH BLDV: 7.3 [PH] (ref 7.3–7.4)
PHOSPHATE SERPL-MCNC: 3.3 MG/DL (ref 2.7–4.5)
PLATELET # BLD AUTO: 373 THOUSANDS/UL (ref 149–390)
PMV BLD AUTO: 10.8 FL (ref 8.9–12.7)
PO2 BLDV: 36.9 MM HG (ref 35–45)
PO2 BLDV: 37.7 MM HG (ref 35–45)
POTASSIUM SERPL-SCNC: 4.4 MMOL/L (ref 3.5–5.3)
POTASSIUM SERPL-SCNC: 4.5 MMOL/L (ref 3.5–5.3)
RBC # BLD AUTO: 4.65 MILLION/UL (ref 3.81–5.12)
SODIUM SERPL-SCNC: 132 MMOL/L (ref 135–147)
SODIUM SERPL-SCNC: 134 MMOL/L (ref 135–147)
WBC # BLD AUTO: 10.41 THOUSAND/UL (ref 4.31–10.16)

## 2025-07-19 PROCEDURE — 85730 THROMBOPLASTIN TIME PARTIAL: CPT

## 2025-07-19 PROCEDURE — 82948 REAGENT STRIP/BLOOD GLUCOSE: CPT

## 2025-07-19 PROCEDURE — 80048 BASIC METABOLIC PNL TOTAL CA: CPT

## 2025-07-19 PROCEDURE — 99232 SBSQ HOSP IP/OBS MODERATE 35: CPT | Performed by: SURGERY

## 2025-07-19 PROCEDURE — 85027 COMPLETE CBC AUTOMATED: CPT

## 2025-07-19 PROCEDURE — 83735 ASSAY OF MAGNESIUM: CPT

## 2025-07-19 PROCEDURE — 82805 BLOOD GASES W/O2 SATURATION: CPT

## 2025-07-19 PROCEDURE — 82010 KETONE BODYS QUAN: CPT

## 2025-07-19 PROCEDURE — 99232 SBSQ HOSP IP/OBS MODERATE 35: CPT

## 2025-07-19 PROCEDURE — 84100 ASSAY OF PHOSPHORUS: CPT

## 2025-07-19 PROCEDURE — 99222 1ST HOSP IP/OBS MODERATE 55: CPT | Performed by: INTERNAL MEDICINE

## 2025-07-19 RX ORDER — INSULIN LISPRO 100 [IU]/ML
2-12 INJECTION, SOLUTION INTRAVENOUS; SUBCUTANEOUS
Status: DISCONTINUED | OUTPATIENT
Start: 2025-07-19 | End: 2025-07-20

## 2025-07-19 RX ORDER — SODIUM CHLORIDE 9 MG/ML
100 INJECTION, SOLUTION INTRAVENOUS CONTINUOUS
Status: DISPENSED | OUTPATIENT
Start: 2025-07-19 | End: 2025-07-19

## 2025-07-19 RX ORDER — INSULIN GLARGINE 100 [IU]/ML
35 INJECTION, SOLUTION SUBCUTANEOUS 2 TIMES DAILY
Status: DISCONTINUED | OUTPATIENT
Start: 2025-07-19 | End: 2025-07-20

## 2025-07-19 RX ORDER — MAGNESIUM SULFATE HEPTAHYDRATE 40 MG/ML
2 INJECTION, SOLUTION INTRAVENOUS ONCE
Status: COMPLETED | OUTPATIENT
Start: 2025-07-19 | End: 2025-07-19

## 2025-07-19 RX ORDER — AMOXICILLIN 250 MG
1 CAPSULE ORAL 2 TIMES DAILY
Status: DISCONTINUED | OUTPATIENT
Start: 2025-07-19 | End: 2025-07-27 | Stop reason: HOSPADM

## 2025-07-19 RX ORDER — INSULIN LISPRO 100 [IU]/ML
5 INJECTION, SOLUTION INTRAVENOUS; SUBCUTANEOUS ONCE
Status: COMPLETED | OUTPATIENT
Start: 2025-07-19 | End: 2025-07-19

## 2025-07-19 RX ADMIN — HEPARIN SODIUM 16.1 UNITS/KG/HR: 10000 INJECTION, SOLUTION INTRAVENOUS at 08:30

## 2025-07-19 RX ADMIN — CYCLOBENZAPRINE HYDROCHLORIDE 10 MG: 10 TABLET, FILM COATED ORAL at 17:16

## 2025-07-19 RX ADMIN — INSULIN GLARGINE 35 UNITS: 100 INJECTION, SOLUTION SUBCUTANEOUS at 22:10

## 2025-07-19 RX ADMIN — ATORVASTATIN CALCIUM 80 MG: 80 TABLET, FILM COATED ORAL at 17:16

## 2025-07-19 RX ADMIN — GABAPENTIN 600 MG: 300 CAPSULE ORAL at 22:10

## 2025-07-19 RX ADMIN — CYCLOBENZAPRINE HYDROCHLORIDE 10 MG: 10 TABLET, FILM COATED ORAL at 22:10

## 2025-07-19 RX ADMIN — HYDROMORPHONE HYDROCHLORIDE 0.5 MG: 1 INJECTION, SOLUTION INTRAMUSCULAR; INTRAVENOUS; SUBCUTANEOUS at 11:42

## 2025-07-19 RX ADMIN — INSULIN GLARGINE 30 UNITS: 100 INJECTION, SOLUTION SUBCUTANEOUS at 10:31

## 2025-07-19 RX ADMIN — HYDROMORPHONE HYDROCHLORIDE 0.5 MG: 1 INJECTION, SOLUTION INTRAMUSCULAR; INTRAVENOUS; SUBCUTANEOUS at 05:33

## 2025-07-19 RX ADMIN — OXYCODONE HYDROCHLORIDE 10 MG: 10 TABLET ORAL at 04:36

## 2025-07-19 RX ADMIN — SENNOSIDES, DOCUSATE SODIUM 1 TABLET: 50; 8.6 TABLET, FILM COATED ORAL at 17:16

## 2025-07-19 RX ADMIN — MAGNESIUM SULFATE HEPTAHYDRATE 2 G: 40 INJECTION, SOLUTION INTRAVENOUS at 01:24

## 2025-07-19 RX ADMIN — HYDROMORPHONE HYDROCHLORIDE 0.5 MG: 1 INJECTION, SOLUTION INTRAMUSCULAR; INTRAVENOUS; SUBCUTANEOUS at 01:30

## 2025-07-19 RX ADMIN — INSULIN LISPRO 5 UNITS: 100 INJECTION, SOLUTION INTRAVENOUS; SUBCUTANEOUS at 05:35

## 2025-07-19 RX ADMIN — SODIUM CHLORIDE 75 ML/HR: 0.9 INJECTION, SOLUTION INTRAVENOUS at 00:11

## 2025-07-19 RX ADMIN — MAGNESIUM OXIDE TAB 400 MG (241.3 MG ELEMENTAL MG) 400 MG: 400 (241.3 MG) TAB at 17:16

## 2025-07-19 RX ADMIN — ACETAMINOPHEN 975 MG: 325 TABLET ORAL at 05:35

## 2025-07-19 RX ADMIN — NICOTINE 1 PATCH: 21 PATCH, EXTENDED RELEASE TRANSDERMAL at 08:08

## 2025-07-19 RX ADMIN — INSULIN LISPRO 4 UNITS: 100 INJECTION, SOLUTION INTRAVENOUS; SUBCUTANEOUS at 17:19

## 2025-07-19 RX ADMIN — INSULIN LISPRO 10 UNITS: 100 INJECTION, SOLUTION INTRAVENOUS; SUBCUTANEOUS at 00:08

## 2025-07-19 RX ADMIN — INSULIN LISPRO 10 UNITS: 100 INJECTION, SOLUTION INTRAVENOUS; SUBCUTANEOUS at 22:10

## 2025-07-19 RX ADMIN — OXYCODONE HYDROCHLORIDE 10 MG: 10 TABLET ORAL at 10:34

## 2025-07-19 RX ADMIN — OXYCODONE HYDROCHLORIDE 10 MG: 10 TABLET ORAL at 00:25

## 2025-07-19 RX ADMIN — PRAZOSIN HYDROCHLORIDE 1 MG: 1 CAPSULE ORAL at 22:11

## 2025-07-19 RX ADMIN — HEPARIN SODIUM 2000 UNITS: 1000 INJECTION, SOLUTION INTRAVENOUS; SUBCUTANEOUS at 05:33

## 2025-07-19 RX ADMIN — INSULIN LISPRO 5 UNITS: 100 INJECTION, SOLUTION INTRAVENOUS; SUBCUTANEOUS at 08:08

## 2025-07-19 RX ADMIN — ASPIRIN 81 MG CHEWABLE TABLET 81 MG: 81 TABLET CHEWABLE at 08:07

## 2025-07-19 RX ADMIN — POLYETHYLENE GLYCOL 3350 17 G: 17 POWDER, FOR SOLUTION ORAL at 08:23

## 2025-07-19 RX ADMIN — CYCLOBENZAPRINE HYDROCHLORIDE 10 MG: 10 TABLET, FILM COATED ORAL at 08:07

## 2025-07-19 RX ADMIN — INSULIN LISPRO 6 UNITS: 100 INJECTION, SOLUTION INTRAVENOUS; SUBCUTANEOUS at 08:08

## 2025-07-19 RX ADMIN — MAGNESIUM OXIDE TAB 400 MG (241.3 MG ELEMENTAL MG) 400 MG: 400 (241.3 MG) TAB at 08:07

## 2025-07-19 RX ADMIN — INSULIN LISPRO 6 UNITS: 100 INJECTION, SOLUTION INTRAVENOUS; SUBCUTANEOUS at 11:49

## 2025-07-19 RX ADMIN — ARIPIPRAZOLE 5 MG: 5 TABLET ORAL at 08:07

## 2025-07-19 RX ADMIN — HYDROMORPHONE HYDROCHLORIDE 0.5 MG: 1 INJECTION, SOLUTION INTRAMUSCULAR; INTRAVENOUS; SUBCUTANEOUS at 20:16

## 2025-07-19 RX ADMIN — GABAPENTIN 600 MG: 300 CAPSULE ORAL at 17:16

## 2025-07-19 RX ADMIN — OXYCODONE HYDROCHLORIDE 10 MG: 10 TABLET ORAL at 14:37

## 2025-07-19 RX ADMIN — DULOXETINE 60 MG: 60 CAPSULE, DELAYED RELEASE ORAL at 08:07

## 2025-07-19 RX ADMIN — GABAPENTIN 600 MG: 300 CAPSULE ORAL at 08:07

## 2025-07-19 RX ADMIN — INSULIN LISPRO 5 UNITS: 100 INJECTION, SOLUTION INTRAVENOUS; SUBCUTANEOUS at 01:36

## 2025-07-19 RX ADMIN — OXYCODONE HYDROCHLORIDE 10 MG: 10 TABLET ORAL at 18:45

## 2025-07-19 RX ADMIN — HYDROMORPHONE HYDROCHLORIDE 0.5 MG: 1 INJECTION, SOLUTION INTRAMUSCULAR; INTRAVENOUS; SUBCUTANEOUS at 15:32

## 2025-07-19 RX ADMIN — ACETAMINOPHEN 975 MG: 325 TABLET ORAL at 22:10

## 2025-07-19 RX ADMIN — HYDROMORPHONE HYDROCHLORIDE 0.5 MG: 1 INJECTION, SOLUTION INTRAMUSCULAR; INTRAVENOUS; SUBCUTANEOUS at 08:33

## 2025-07-19 NOTE — ANESTHESIA POSTPROCEDURE EVALUATION
Post-Op Assessment Note    CV Status:  Stable    Pain management: adequate       Mental Status:  Sleepy   Hydration Status:  Stable   PONV Controlled:  Controlled   Airway Patency:  Patent     Post Op Vitals Reviewed: Yes    No anethesia notable event occurred.    Staff: Anesthesiologist, CRNA           Last Filed PACU Vitals:  Vitals Value Taken Time   Temp 97.4 °F (36.3 °C) 07/18/25 18:07   Pulse 105 07/18/25 18:11   /100 07/18/25 18:11   Resp 18 07/18/25 18:11   SpO2 96 % 07/18/25 18:11   Vitals shown include unfiled device data.    Modified Wilfredo:     Vitals Value Taken Time   Activity 2 07/18/25 18:45   Respiration 2 07/18/25 18:45   Circulation 2 07/18/25 18:45   Consciousness 2 07/18/25 18:45   Oxygen Saturation 2 07/18/25 18:45     Modified Wilfredo Score: 10

## 2025-07-20 LAB
ANION GAP SERPL CALCULATED.3IONS-SCNC: 7 MMOL/L (ref 4–13)
APTT PPP: 60 SECONDS (ref 23–34)
ATRIAL RATE: 96 BPM
BUN SERPL-MCNC: 16 MG/DL (ref 5–25)
CALCIUM SERPL-MCNC: 8.2 MG/DL (ref 8.4–10.2)
CHLORIDE SERPL-SCNC: 103 MMOL/L (ref 96–108)
CO2 SERPL-SCNC: 27 MMOL/L (ref 21–32)
CREAT SERPL-MCNC: 0.55 MG/DL (ref 0.6–1.3)
ERYTHROCYTE [DISTWIDTH] IN BLOOD BY AUTOMATED COUNT: 15.3 % (ref 11.6–15.1)
GFR SERPL CREATININE-BSD FRML MDRD: 119 ML/MIN/1.73SQ M
GLUCOSE SERPL-MCNC: 184 MG/DL (ref 65–140)
GLUCOSE SERPL-MCNC: 274 MG/DL (ref 65–140)
GLUCOSE SERPL-MCNC: 292 MG/DL (ref 65–140)
GLUCOSE SERPL-MCNC: 320 MG/DL (ref 65–140)
GLUCOSE SERPL-MCNC: 90 MG/DL (ref 65–140)
HCT VFR BLD AUTO: 30.7 % (ref 34.8–46.1)
HGB BLD-MCNC: 10.9 G/DL (ref 11.5–15.4)
MAGNESIUM SERPL-MCNC: 1.9 MG/DL (ref 1.9–2.7)
MCH RBC QN AUTO: 26.7 PG (ref 26.8–34.3)
MCHC RBC AUTO-ENTMCNC: 35.5 G/DL (ref 31.4–37.4)
MCV RBC AUTO: 75 FL (ref 82–98)
P AXIS: 56 DEGREES
PLATELET # BLD AUTO: 331 THOUSANDS/UL (ref 149–390)
PMV BLD AUTO: 10.6 FL (ref 8.9–12.7)
POTASSIUM SERPL-SCNC: 4.5 MMOL/L (ref 3.5–5.3)
PR INTERVAL: 138 MS
QRS AXIS: 40 DEGREES
QRSD INTERVAL: 84 MS
QT INTERVAL: 338 MS
QTC INTERVAL: 428 MS
RBC # BLD AUTO: 4.09 MILLION/UL (ref 3.81–5.12)
SODIUM SERPL-SCNC: 137 MMOL/L (ref 135–147)
T WAVE AXIS: 6 DEGREES
VENTRICULAR RATE: 96 BPM
WBC # BLD AUTO: 10.05 THOUSAND/UL (ref 4.31–10.16)

## 2025-07-20 PROCEDURE — 99223 1ST HOSP IP/OBS HIGH 75: CPT | Performed by: INTERNAL MEDICINE

## 2025-07-20 PROCEDURE — 99232 SBSQ HOSP IP/OBS MODERATE 35: CPT

## 2025-07-20 PROCEDURE — 85027 COMPLETE CBC AUTOMATED: CPT | Performed by: INTERNAL MEDICINE

## 2025-07-20 PROCEDURE — 93010 ELECTROCARDIOGRAM REPORT: CPT | Performed by: INTERNAL MEDICINE

## 2025-07-20 PROCEDURE — 93005 ELECTROCARDIOGRAM TRACING: CPT

## 2025-07-20 PROCEDURE — 82948 REAGENT STRIP/BLOOD GLUCOSE: CPT

## 2025-07-20 PROCEDURE — 80048 BASIC METABOLIC PNL TOTAL CA: CPT | Performed by: INTERNAL MEDICINE

## 2025-07-20 PROCEDURE — 83735 ASSAY OF MAGNESIUM: CPT | Performed by: INTERNAL MEDICINE

## 2025-07-20 PROCEDURE — 85730 THROMBOPLASTIN TIME PARTIAL: CPT | Performed by: INTERNAL MEDICINE

## 2025-07-20 RX ORDER — LACTULOSE 10 G/15ML
20 SOLUTION ORAL DAILY
Status: DISPENSED | OUTPATIENT
Start: 2025-07-20 | End: 2025-07-24

## 2025-07-20 RX ORDER — INSULIN LISPRO 100 [IU]/ML
4-20 INJECTION, SOLUTION INTRAVENOUS; SUBCUTANEOUS
Status: DISCONTINUED | OUTPATIENT
Start: 2025-07-20 | End: 2025-07-21

## 2025-07-20 RX ORDER — LACTULOSE 10 G/15ML
20 SOLUTION ORAL DAILY
Status: DISCONTINUED | OUTPATIENT
Start: 2025-07-20 | End: 2025-07-20

## 2025-07-20 RX ORDER — INSULIN GLARGINE 100 [IU]/ML
50 INJECTION, SOLUTION SUBCUTANEOUS
Status: DISCONTINUED | OUTPATIENT
Start: 2025-07-20 | End: 2025-07-21

## 2025-07-20 RX ORDER — AMLODIPINE BESYLATE 5 MG/1
5 TABLET ORAL DAILY
Status: DISCONTINUED | OUTPATIENT
Start: 2025-07-20 | End: 2025-07-27 | Stop reason: HOSPADM

## 2025-07-20 RX ORDER — MUSCLE RUB CREAM 100; 150 MG/G; MG/G
CREAM TOPICAL 4 TIMES DAILY PRN
Status: DISCONTINUED | OUTPATIENT
Start: 2025-07-20 | End: 2025-07-27 | Stop reason: HOSPADM

## 2025-07-20 RX ORDER — INSULIN LISPRO 100 [IU]/ML
15 INJECTION, SOLUTION INTRAVENOUS; SUBCUTANEOUS
Status: DISCONTINUED | OUTPATIENT
Start: 2025-07-20 | End: 2025-07-21

## 2025-07-20 RX ADMIN — HYDROMORPHONE HYDROCHLORIDE 0.5 MG: 1 INJECTION, SOLUTION INTRAMUSCULAR; INTRAVENOUS; SUBCUTANEOUS at 23:40

## 2025-07-20 RX ADMIN — INSULIN LISPRO 15 UNITS: 100 INJECTION, SOLUTION INTRAVENOUS; SUBCUTANEOUS at 11:02

## 2025-07-20 RX ADMIN — GABAPENTIN 600 MG: 300 CAPSULE ORAL at 08:19

## 2025-07-20 RX ADMIN — SENNOSIDES, DOCUSATE SODIUM 1 TABLET: 50; 8.6 TABLET, FILM COATED ORAL at 21:40

## 2025-07-20 RX ADMIN — SENNOSIDES, DOCUSATE SODIUM 1 TABLET: 50; 8.6 TABLET, FILM COATED ORAL at 08:18

## 2025-07-20 RX ADMIN — OXYCODONE HYDROCHLORIDE 10 MG: 10 TABLET ORAL at 04:01

## 2025-07-20 RX ADMIN — OXYCODONE HYDROCHLORIDE 10 MG: 10 TABLET ORAL at 13:10

## 2025-07-20 RX ADMIN — MAGNESIUM OXIDE TAB 400 MG (241.3 MG ELEMENTAL MG) 400 MG: 400 (241.3 MG) TAB at 17:32

## 2025-07-20 RX ADMIN — OXYCODONE HYDROCHLORIDE 10 MG: 10 TABLET ORAL at 08:19

## 2025-07-20 RX ADMIN — GABAPENTIN 600 MG: 300 CAPSULE ORAL at 21:37

## 2025-07-20 RX ADMIN — CYCLOBENZAPRINE HYDROCHLORIDE 10 MG: 10 TABLET, FILM COATED ORAL at 08:18

## 2025-07-20 RX ADMIN — CYCLOBENZAPRINE HYDROCHLORIDE 10 MG: 10 TABLET, FILM COATED ORAL at 21:37

## 2025-07-20 RX ADMIN — INSULIN LISPRO 8 UNITS: 100 INJECTION, SOLUTION INTRAVENOUS; SUBCUTANEOUS at 06:41

## 2025-07-20 RX ADMIN — DULOXETINE 60 MG: 60 CAPSULE, DELAYED RELEASE ORAL at 08:18

## 2025-07-20 RX ADMIN — LACTULOSE 20 G: 20 SOLUTION ORAL at 21:38

## 2025-07-20 RX ADMIN — ATORVASTATIN CALCIUM 80 MG: 80 TABLET, FILM COATED ORAL at 15:52

## 2025-07-20 RX ADMIN — CYCLOBENZAPRINE HYDROCHLORIDE 10 MG: 10 TABLET, FILM COATED ORAL at 15:52

## 2025-07-20 RX ADMIN — OXYCODONE HYDROCHLORIDE 10 MG: 10 TABLET ORAL at 21:40

## 2025-07-20 RX ADMIN — HYDROMORPHONE HYDROCHLORIDE 0.5 MG: 1 INJECTION, SOLUTION INTRAMUSCULAR; INTRAVENOUS; SUBCUTANEOUS at 15:53

## 2025-07-20 RX ADMIN — INSULIN GLARGINE 50 UNITS: 100 INJECTION, SOLUTION SUBCUTANEOUS at 21:38

## 2025-07-20 RX ADMIN — PRAZOSIN HYDROCHLORIDE 1 MG: 1 CAPSULE ORAL at 21:41

## 2025-07-20 RX ADMIN — ACETAMINOPHEN 975 MG: 325 TABLET ORAL at 13:10

## 2025-07-20 RX ADMIN — MAGNESIUM OXIDE TAB 400 MG (241.3 MG ELEMENTAL MG) 400 MG: 400 (241.3 MG) TAB at 08:18

## 2025-07-20 RX ADMIN — GABAPENTIN 600 MG: 300 CAPSULE ORAL at 15:52

## 2025-07-20 RX ADMIN — INSULIN LISPRO 2 UNITS: 100 INJECTION, SOLUTION INTRAVENOUS; SUBCUTANEOUS at 11:02

## 2025-07-20 RX ADMIN — HEPARIN SODIUM 14.1 UNITS/KG/HR: 10000 INJECTION, SOLUTION INTRAVENOUS at 20:16

## 2025-07-20 RX ADMIN — ACETAMINOPHEN 975 MG: 325 TABLET ORAL at 05:00

## 2025-07-20 RX ADMIN — POLYETHYLENE GLYCOL 3350 17 G: 17 POWDER, FOR SOLUTION ORAL at 08:20

## 2025-07-20 RX ADMIN — NICOTINE 1 PATCH: 21 PATCH, EXTENDED RELEASE TRANSDERMAL at 08:24

## 2025-07-20 RX ADMIN — HYDROMORPHONE HYDROCHLORIDE 0.5 MG: 1 INJECTION, SOLUTION INTRAMUSCULAR; INTRAVENOUS; SUBCUTANEOUS at 10:09

## 2025-07-20 RX ADMIN — LISINOPRIL 20 MG: 20 TABLET ORAL at 08:18

## 2025-07-20 RX ADMIN — AMLODIPINE BESYLATE 5 MG: 5 TABLET ORAL at 08:18

## 2025-07-20 RX ADMIN — INSULIN LISPRO 12 UNITS: 100 INJECTION, SOLUTION INTRAVENOUS; SUBCUTANEOUS at 21:38

## 2025-07-20 RX ADMIN — METHYLNALTREXONE BROMIDE 12 MG: 12 INJECTION, SOLUTION SUBCUTANEOUS at 11:56

## 2025-07-20 RX ADMIN — HEPARIN SODIUM 14.1 UNITS/KG/HR: 10000 INJECTION, SOLUTION INTRAVENOUS at 01:42

## 2025-07-20 RX ADMIN — ARIPIPRAZOLE 5 MG: 5 TABLET ORAL at 08:21

## 2025-07-20 RX ADMIN — ACETAMINOPHEN 975 MG: 325 TABLET ORAL at 21:38

## 2025-07-20 RX ADMIN — ASPIRIN 81 MG CHEWABLE TABLET 81 MG: 81 TABLET CHEWABLE at 08:18

## 2025-07-21 ENCOUNTER — ANESTHESIA EVENT (INPATIENT)
Dept: PERIOP | Facility: HOSPITAL | Age: 40
End: 2025-07-21
Payer: COMMERCIAL

## 2025-07-21 LAB
ABO GROUP BLD: NORMAL
ANION GAP SERPL CALCULATED.3IONS-SCNC: 8 MMOL/L (ref 4–13)
APTT PPP: 59 SECONDS (ref 23–34)
APTT PPP: 64 SECONDS (ref 23–34)
APTT PPP: 72 SECONDS (ref 23–34)
BLD GP AB SCN SERPL QL: NEGATIVE
BUN SERPL-MCNC: 15 MG/DL (ref 5–25)
CALCIUM SERPL-MCNC: 8.5 MG/DL (ref 8.4–10.2)
CHLORIDE SERPL-SCNC: 102 MMOL/L (ref 96–108)
CO2 SERPL-SCNC: 27 MMOL/L (ref 21–32)
CREAT SERPL-MCNC: 0.44 MG/DL (ref 0.6–1.3)
ERYTHROCYTE [DISTWIDTH] IN BLOOD BY AUTOMATED COUNT: 15.4 % (ref 11.6–15.1)
GFR SERPL CREATININE-BSD FRML MDRD: 128 ML/MIN/1.73SQ M
GLUCOSE SERPL-MCNC: 137 MG/DL (ref 65–140)
GLUCOSE SERPL-MCNC: 138 MG/DL (ref 65–140)
GLUCOSE SERPL-MCNC: 186 MG/DL (ref 65–140)
GLUCOSE SERPL-MCNC: 210 MG/DL (ref 65–140)
GLUCOSE SERPL-MCNC: 277 MG/DL (ref 65–140)
GLUCOSE SERPL-MCNC: 64 MG/DL (ref 65–140)
GLUCOSE SERPL-MCNC: 75 MG/DL (ref 65–140)
HCG SERPL QL: NEGATIVE
HCT VFR BLD AUTO: 33.6 % (ref 34.8–46.1)
HGB BLD-MCNC: 11.7 G/DL (ref 11.5–15.4)
MCH RBC QN AUTO: 26.1 PG (ref 26.8–34.3)
MCHC RBC AUTO-ENTMCNC: 34.8 G/DL (ref 31.4–37.4)
MCV RBC AUTO: 75 FL (ref 82–98)
PLATELET # BLD AUTO: 360 THOUSANDS/UL (ref 149–390)
PMV BLD AUTO: 10.6 FL (ref 8.9–12.7)
POTASSIUM SERPL-SCNC: 4.4 MMOL/L (ref 3.5–5.3)
RBC # BLD AUTO: 4.48 MILLION/UL (ref 3.81–5.12)
RH BLD: POSITIVE
SODIUM SERPL-SCNC: 137 MMOL/L (ref 135–147)
SPECIMEN EXPIRATION DATE: NORMAL
WBC # BLD AUTO: 10.57 THOUSAND/UL (ref 4.31–10.16)

## 2025-07-21 PROCEDURE — 82948 REAGENT STRIP/BLOOD GLUCOSE: CPT

## 2025-07-21 PROCEDURE — 85730 THROMBOPLASTIN TIME PARTIAL: CPT | Performed by: INTERNAL MEDICINE

## 2025-07-21 PROCEDURE — 99232 SBSQ HOSP IP/OBS MODERATE 35: CPT | Performed by: FAMILY MEDICINE

## 2025-07-21 PROCEDURE — 85730 THROMBOPLASTIN TIME PARTIAL: CPT

## 2025-07-21 PROCEDURE — 86850 RBC ANTIBODY SCREEN: CPT | Performed by: PHYSICIAN ASSISTANT

## 2025-07-21 PROCEDURE — 84703 CHORIONIC GONADOTROPIN ASSAY: CPT | Performed by: PHYSICIAN ASSISTANT

## 2025-07-21 PROCEDURE — 86900 BLOOD TYPING SEROLOGIC ABO: CPT | Performed by: PHYSICIAN ASSISTANT

## 2025-07-21 PROCEDURE — 99232 SBSQ HOSP IP/OBS MODERATE 35: CPT | Performed by: INTERNAL MEDICINE

## 2025-07-21 PROCEDURE — 86923 COMPATIBILITY TEST ELECTRIC: CPT

## 2025-07-21 PROCEDURE — 80048 BASIC METABOLIC PNL TOTAL CA: CPT

## 2025-07-21 PROCEDURE — 85027 COMPLETE CBC AUTOMATED: CPT

## 2025-07-21 PROCEDURE — 86901 BLOOD TYPING SEROLOGIC RH(D): CPT | Performed by: PHYSICIAN ASSISTANT

## 2025-07-21 PROCEDURE — 85730 THROMBOPLASTIN TIME PARTIAL: CPT | Performed by: FAMILY MEDICINE

## 2025-07-21 PROCEDURE — 99223 1ST HOSP IP/OBS HIGH 75: CPT

## 2025-07-21 RX ORDER — INSULIN LISPRO 100 [IU]/ML
2-12 INJECTION, SOLUTION INTRAVENOUS; SUBCUTANEOUS
Status: DISCONTINUED | OUTPATIENT
Start: 2025-07-21 | End: 2025-07-21

## 2025-07-21 RX ORDER — INSULIN LISPRO 100 [IU]/ML
1-6 INJECTION, SOLUTION INTRAVENOUS; SUBCUTANEOUS
Status: DISCONTINUED | OUTPATIENT
Start: 2025-07-21 | End: 2025-07-23

## 2025-07-21 RX ORDER — CHLORHEXIDINE GLUCONATE ORAL RINSE 1.2 MG/ML
15 SOLUTION DENTAL ONCE
Status: COMPLETED | OUTPATIENT
Start: 2025-07-22 | End: 2025-07-22

## 2025-07-21 RX ORDER — MAGNESIUM CARB/ALUMINUM HYDROX 105-160MG
296 TABLET,CHEWABLE ORAL ONCE
Status: DISCONTINUED | OUTPATIENT
Start: 2025-07-21 | End: 2025-07-27 | Stop reason: HOSPADM

## 2025-07-21 RX ORDER — INSULIN LISPRO 100 [IU]/ML
10 INJECTION, SOLUTION INTRAVENOUS; SUBCUTANEOUS
Status: DISCONTINUED | OUTPATIENT
Start: 2025-07-21 | End: 2025-07-21

## 2025-07-21 RX ORDER — HALOPERIDOL 5 MG/ML
2 INJECTION INTRAMUSCULAR
Status: DISCONTINUED | OUTPATIENT
Start: 2025-07-21 | End: 2025-07-25

## 2025-07-21 RX ORDER — INSULIN GLARGINE 100 [IU]/ML
40 INJECTION, SOLUTION SUBCUTANEOUS
Status: DISCONTINUED | OUTPATIENT
Start: 2025-07-21 | End: 2025-07-23

## 2025-07-21 RX ORDER — OXYCODONE HYDROCHLORIDE 10 MG/1
10 TABLET ORAL EVERY 4 HOURS PRN
Refills: 0 | Status: DISCONTINUED | OUTPATIENT
Start: 2025-07-21 | End: 2025-07-22

## 2025-07-21 RX ORDER — CHLORHEXIDINE GLUCONATE ORAL RINSE 1.2 MG/ML
15 SOLUTION DENTAL ONCE
Status: DISCONTINUED | OUTPATIENT
Start: 2025-07-21 | End: 2025-07-21

## 2025-07-21 RX ORDER — INSULIN LISPRO 100 [IU]/ML
2-12 INJECTION, SOLUTION INTRAVENOUS; SUBCUTANEOUS EVERY 6 HOURS
Status: DISCONTINUED | OUTPATIENT
Start: 2025-07-22 | End: 2025-07-22

## 2025-07-21 RX ORDER — GLYCOPYRROLATE 0.2 MG/ML
0.2 INJECTION INTRAMUSCULAR; INTRAVENOUS EVERY 4 HOURS PRN
Status: DISCONTINUED | OUTPATIENT
Start: 2025-07-21 | End: 2025-07-25

## 2025-07-21 RX ORDER — INSULIN LISPRO 100 [IU]/ML
10 INJECTION, SOLUTION INTRAVENOUS; SUBCUTANEOUS EVERY 6 HOURS
Status: DISCONTINUED | OUTPATIENT
Start: 2025-07-22 | End: 2025-07-22

## 2025-07-21 RX ORDER — GABAPENTIN 300 MG/1
600 CAPSULE ORAL 4 TIMES DAILY
Status: DISCONTINUED | OUTPATIENT
Start: 2025-07-21 | End: 2025-07-27

## 2025-07-21 RX ORDER — LORAZEPAM 2 MG/ML
1 INJECTION INTRAMUSCULAR
Status: DISCONTINUED | OUTPATIENT
Start: 2025-07-21 | End: 2025-07-24

## 2025-07-21 RX ADMIN — GABAPENTIN 600 MG: 300 CAPSULE ORAL at 21:23

## 2025-07-21 RX ADMIN — HYDROMORPHONE HYDROCHLORIDE 0.5 MG: 1 INJECTION, SOLUTION INTRAMUSCULAR; INTRAVENOUS; SUBCUTANEOUS at 15:50

## 2025-07-21 RX ADMIN — GABAPENTIN 600 MG: 300 CAPSULE ORAL at 17:06

## 2025-07-21 RX ADMIN — DULOXETINE 60 MG: 60 CAPSULE, DELAYED RELEASE ORAL at 07:30

## 2025-07-21 RX ADMIN — HYDROMORPHONE HYDROCHLORIDE 0.5 MG: 1 INJECTION, SOLUTION INTRAMUSCULAR; INTRAVENOUS; SUBCUTANEOUS at 10:33

## 2025-07-21 RX ADMIN — HEPARIN SODIUM 2000 UNITS: 1000 INJECTION, SOLUTION INTRAVENOUS; SUBCUTANEOUS at 07:26

## 2025-07-21 RX ADMIN — SENNOSIDES, DOCUSATE SODIUM 1 TABLET: 50; 8.6 TABLET, FILM COATED ORAL at 07:30

## 2025-07-21 RX ADMIN — NICOTINE 1 PATCH: 21 PATCH, EXTENDED RELEASE TRANSDERMAL at 07:24

## 2025-07-21 RX ADMIN — SENNOSIDES, DOCUSATE SODIUM 1 TABLET: 50; 8.6 TABLET, FILM COATED ORAL at 20:32

## 2025-07-21 RX ADMIN — ACETAMINOPHEN 975 MG: 325 TABLET ORAL at 05:58

## 2025-07-21 RX ADMIN — INSULIN LISPRO 10 UNITS: 100 INJECTION, SOLUTION INTRAVENOUS; SUBCUTANEOUS at 17:08

## 2025-07-21 RX ADMIN — INSULIN LISPRO 15 UNITS: 100 INJECTION, SOLUTION INTRAVENOUS; SUBCUTANEOUS at 07:21

## 2025-07-21 RX ADMIN — ATORVASTATIN CALCIUM 80 MG: 80 TABLET, FILM COATED ORAL at 15:50

## 2025-07-21 RX ADMIN — HYDROMORPHONE HYDROCHLORIDE 0.5 MG: 1 INJECTION, SOLUTION INTRAMUSCULAR; INTRAVENOUS; SUBCUTANEOUS at 21:24

## 2025-07-21 RX ADMIN — LISINOPRIL 20 MG: 20 TABLET ORAL at 07:24

## 2025-07-21 RX ADMIN — ARIPIPRAZOLE 5 MG: 5 TABLET ORAL at 07:32

## 2025-07-21 RX ADMIN — CYCLOBENZAPRINE HYDROCHLORIDE 10 MG: 10 TABLET, FILM COATED ORAL at 15:50

## 2025-07-21 RX ADMIN — INSULIN LISPRO 4 UNITS: 100 INJECTION, SOLUTION INTRAVENOUS; SUBCUTANEOUS at 17:07

## 2025-07-21 RX ADMIN — OXYCODONE HYDROCHLORIDE 15 MG: 10 TABLET ORAL at 14:21

## 2025-07-21 RX ADMIN — INSULIN LISPRO 8 UNITS: 100 INJECTION, SOLUTION INTRAVENOUS; SUBCUTANEOUS at 07:20

## 2025-07-21 RX ADMIN — CYCLOBENZAPRINE HYDROCHLORIDE 10 MG: 10 TABLET, FILM COATED ORAL at 07:30

## 2025-07-21 RX ADMIN — HYDROMORPHONE HYDROCHLORIDE 0.5 MG: 1 INJECTION, SOLUTION INTRAMUSCULAR; INTRAVENOUS; SUBCUTANEOUS at 07:23

## 2025-07-21 RX ADMIN — ACETAMINOPHEN 975 MG: 325 TABLET ORAL at 21:23

## 2025-07-21 RX ADMIN — CYCLOBENZAPRINE HYDROCHLORIDE 10 MG: 10 TABLET, FILM COATED ORAL at 20:32

## 2025-07-21 RX ADMIN — AMLODIPINE BESYLATE 5 MG: 5 TABLET ORAL at 07:30

## 2025-07-21 RX ADMIN — OXYCODONE HYDROCHLORIDE 15 MG: 10 TABLET ORAL at 20:32

## 2025-07-21 RX ADMIN — PRAZOSIN HYDROCHLORIDE 1 MG: 1 CAPSULE ORAL at 21:24

## 2025-07-21 RX ADMIN — LACTULOSE 20 G: 20 SOLUTION ORAL at 07:24

## 2025-07-21 RX ADMIN — LIDOCAINE 1 APPLICATION: 40 CREAM TOPICAL at 02:16

## 2025-07-21 RX ADMIN — HEPARIN SODIUM 16.1 UNITS/KG/HR: 10000 INJECTION, SOLUTION INTRAVENOUS at 15:50

## 2025-07-21 RX ADMIN — OXYCODONE HYDROCHLORIDE 10 MG: 10 TABLET ORAL at 05:58

## 2025-07-21 RX ADMIN — OXYCODONE HYDROCHLORIDE 10 MG: 10 TABLET ORAL at 10:09

## 2025-07-21 RX ADMIN — GABAPENTIN 600 MG: 300 CAPSULE ORAL at 07:30

## 2025-07-21 RX ADMIN — INSULIN GLARGINE 40 UNITS: 100 INJECTION, SOLUTION SUBCUTANEOUS at 21:23

## 2025-07-21 RX ADMIN — ACETAMINOPHEN 975 MG: 325 TABLET ORAL at 14:21

## 2025-07-21 RX ADMIN — MAGNESIUM OXIDE TAB 400 MG (241.3 MG ELEMENTAL MG) 400 MG: 400 (241.3 MG) TAB at 07:24

## 2025-07-21 RX ADMIN — ASPIRIN 81 MG CHEWABLE TABLET 81 MG: 81 TABLET CHEWABLE at 07:24

## 2025-07-21 NOTE — ANESTHESIA PREPROCEDURE EVALUATION
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, left leg (HCC)   (+) Essential hypertension   (+) Mixed hyperlipidemia   (+) PAD (peripheral artery disease) with right lower extremity pain   (+) Superficial femoral artery occlusion (HCC)      ENDO   (+) Insulin dependent diabetes mellitus ([facility])   (+) Type 2 diabetes mellitus with diabetic polyneuropathy, with long-term current use of insulin (HCC)      HEMATOLOGY   (+) Iron deficiency anemia   (+) Postoperative blood loss anemia      MUSCULOSKELETAL   (+) Chronic bilateral low back pain without sciatica      NEURO/PSYCH   (+) Chronic bilateral low back pain without sciatica   (+) Chronic pain of left upper extremity   (+) Diabetic autonomic neuropathy associated with type 2 diabetes mellitus ([facility])   (+) Diabetic neuropathy ([facility])   (+) Diplopia   (+) Paresthesia   (+) Type 2 diabetes mellitus with diabetic polyneuropathy, with long-term current use of insulin ([facility])   (+) Uncontrolled type 2 diabetes mellitus with diabetic polyneuropathy, without long-term current use of insulin      PULMONARY   (+) COPD (chronic obstructive pulmonary disease)/asthma   (+) Moderate persistent asthma without complication   (+) Obstructive sleep apnea      Behavioral Health   (+) Bipolar disorder   (+) Cocaine use   (+) History of substance use   (+) Tobacco abuse      Neurology/Sleep   (+) History of CVA (cerebrovascular accident)      Care Coordination   (+) S/P AKA (above knee amputation) unilateral, left (HCC)      Other   (+) Morbid obesity        Physical Exam    Airway     Mallampati score: III  TM Distance: >3 FB  Neck ROM: full      Cardiovascular      Dental   Comment: Denies loose teeth     Pulmonary      Neurological    She appears awake, alert and oriented x3.      Other  Findings  post-pubertal.      Anesthesia Plan  ASA Score- 4     Anesthesia Type- general with ASA Monitors.         Additional Monitors: arterial line.    Airway Plan: Oral ETT.           Plan Factors-Exercise tolerance (METS): >4 METS.    Chart reviewed. EKG reviewed.  Existing labs reviewed. Patient summary reviewed.    Patient is a current smoker.              Induction- intravenous.    Postoperative Plan- .   Monitoring Plan - Monitoring plan - standard ASA monitoring and arterial line kit  Post Operative Pain Plan - multimodal analgesia    Perioperative Resuscitation Plan - Level 1 - Full Code.       Informed Consent- Anesthetic plan and risks discussed with patient.  I personally reviewed this patient with the CRNA. Discussed and agreed on the Anesthesia Plan with the CRNA..      NPO Status:  Vitals Value Taken Time   Date of last liquid 07/17/25 07/17/25 23:00   Time of last liquid     Date of last solid     Time of last solid

## 2025-07-22 ENCOUNTER — ANESTHESIA (INPATIENT)
Dept: PERIOP | Facility: HOSPITAL | Age: 40
End: 2025-07-22
Payer: COMMERCIAL

## 2025-07-22 ENCOUNTER — PATIENT OUTREACH (OUTPATIENT)
Dept: CASE MANAGEMENT | Facility: OTHER | Age: 40
End: 2025-07-22

## 2025-07-22 ENCOUNTER — APPOINTMENT (INPATIENT)
Dept: RADIOLOGY | Facility: HOSPITAL | Age: 40
DRG: 253 | End: 2025-07-22
Payer: COMMERCIAL

## 2025-07-22 PROBLEM — R52 ACUTE PAIN: Status: ACTIVE | Noted: 2025-07-22

## 2025-07-22 LAB
ANION GAP SERPL CALCULATED.3IONS-SCNC: 8 MMOL/L (ref 4–13)
APTT PPP: 78 SECONDS (ref 23–34)
BUN SERPL-MCNC: 17 MG/DL (ref 5–25)
CALCIUM SERPL-MCNC: 8.7 MG/DL (ref 8.4–10.2)
CHLORIDE SERPL-SCNC: 98 MMOL/L (ref 96–108)
CO2 SERPL-SCNC: 30 MMOL/L (ref 21–32)
CREAT SERPL-MCNC: 0.57 MG/DL (ref 0.6–1.3)
ERYTHROCYTE [DISTWIDTH] IN BLOOD BY AUTOMATED COUNT: 15.4 % (ref 11.6–15.1)
GFR SERPL CREATININE-BSD FRML MDRD: 117 ML/MIN/1.73SQ M
GLUCOSE SERPL-MCNC: 210 MG/DL (ref 65–140)
GLUCOSE SERPL-MCNC: 216 MG/DL (ref 65–140)
GLUCOSE SERPL-MCNC: 253 MG/DL (ref 65–140)
GLUCOSE SERPL-MCNC: 267 MG/DL (ref 65–140)
GLUCOSE SERPL-MCNC: 335 MG/DL (ref 65–140)
HCT VFR BLD AUTO: 34.7 % (ref 34.8–46.1)
HGB BLD-MCNC: 12 G/DL (ref 11.5–15.4)
KCT BLD-ACNC: 166 SEC (ref 89–137)
MCH RBC QN AUTO: 26.1 PG (ref 26.8–34.3)
MCHC RBC AUTO-ENTMCNC: 34.6 G/DL (ref 31.4–37.4)
MCV RBC AUTO: 76 FL (ref 82–98)
PLATELET # BLD AUTO: 380 THOUSANDS/UL (ref 149–390)
PMV BLD AUTO: 10.1 FL (ref 8.9–12.7)
POTASSIUM SERPL-SCNC: 4.2 MMOL/L (ref 3.5–5.3)
RBC # BLD AUTO: 4.59 MILLION/UL (ref 3.81–5.12)
SODIUM SERPL-SCNC: 136 MMOL/L (ref 135–147)
SPECIMEN SOURCE: ABNORMAL
WBC # BLD AUTO: 10.24 THOUSAND/UL (ref 4.31–10.16)

## 2025-07-22 PROCEDURE — 35556 ART BYP GRFT FEM-POPLITEAL: CPT | Performed by: SURGERY

## 2025-07-22 PROCEDURE — 85730 THROMBOPLASTIN TIME PARTIAL: CPT | Performed by: SURGERY

## 2025-07-22 PROCEDURE — 041K09H BYPASS RIGHT FEMORAL ARTERY TO RIGHT FEMORAL ARTERY WITH AUTOLOGOUS VENOUS TISSUE, OPEN APPROACH: ICD-10-PCS | Performed by: SURGERY

## 2025-07-22 PROCEDURE — 80048 BASIC METABOLIC PNL TOTAL CA: CPT | Performed by: SURGERY

## 2025-07-22 PROCEDURE — 85347 COAGULATION TIME ACTIVATED: CPT

## 2025-07-22 PROCEDURE — 75710 ARTERY X-RAYS ARM/LEG: CPT

## 2025-07-22 PROCEDURE — B41FYZZ FLUOROSCOPY OF RIGHT LOWER EXTREMITY ARTERIES USING OTHER CONTRAST: ICD-10-PCS | Performed by: SURGERY

## 2025-07-22 PROCEDURE — 99232 SBSQ HOSP IP/OBS MODERATE 35: CPT | Performed by: FAMILY MEDICINE

## 2025-07-22 PROCEDURE — NC001 PR NO CHARGE: Performed by: SURGERY

## 2025-07-22 PROCEDURE — 82948 REAGENT STRIP/BLOOD GLUCOSE: CPT

## 2025-07-22 PROCEDURE — A6250 SKIN SEAL PROTECT MOISTURIZR: HCPCS | Performed by: SURGERY

## 2025-07-22 PROCEDURE — C1894 INTRO/SHEATH, NON-LASER: HCPCS | Performed by: SURGERY

## 2025-07-22 PROCEDURE — 99232 SBSQ HOSP IP/OBS MODERATE 35: CPT

## 2025-07-22 PROCEDURE — 85027 COMPLETE CBC AUTOMATED: CPT | Performed by: SURGERY

## 2025-07-22 PROCEDURE — 06BP0ZZ EXCISION OF RIGHT SAPHENOUS VEIN, OPEN APPROACH: ICD-10-PCS | Performed by: SURGERY

## 2025-07-22 RX ORDER — CEFAZOLIN SODIUM 2 G/50ML
2000 SOLUTION INTRAVENOUS ONCE
Status: COMPLETED | OUTPATIENT
Start: 2025-07-22 | End: 2025-07-22

## 2025-07-22 RX ORDER — ROCURONIUM BROMIDE 10 MG/ML
INJECTION, SOLUTION INTRAVENOUS AS NEEDED
Status: DISCONTINUED | OUTPATIENT
Start: 2025-07-22 | End: 2025-07-22

## 2025-07-22 RX ORDER — ONDANSETRON 2 MG/ML
INJECTION INTRAMUSCULAR; INTRAVENOUS AS NEEDED
Status: DISCONTINUED | OUTPATIENT
Start: 2025-07-22 | End: 2025-07-22

## 2025-07-22 RX ORDER — INSULIN LISPRO 100 [IU]/ML
10 INJECTION, SOLUTION INTRAVENOUS; SUBCUTANEOUS
Status: DISCONTINUED | OUTPATIENT
Start: 2025-07-22 | End: 2025-07-22

## 2025-07-22 RX ORDER — ONDANSETRON 2 MG/ML
4 INJECTION INTRAMUSCULAR; INTRAVENOUS ONCE AS NEEDED
Status: DISCONTINUED | OUTPATIENT
Start: 2025-07-22 | End: 2025-07-22 | Stop reason: HOSPADM

## 2025-07-22 RX ORDER — INSULIN LISPRO 100 [IU]/ML
10 INJECTION, SOLUTION INTRAVENOUS; SUBCUTANEOUS
Status: DISCONTINUED | OUTPATIENT
Start: 2025-07-22 | End: 2025-07-23

## 2025-07-22 RX ORDER — SODIUM CHLORIDE, SODIUM LACTATE, POTASSIUM CHLORIDE, CALCIUM CHLORIDE 600; 310; 30; 20 MG/100ML; MG/100ML; MG/100ML; MG/100ML
INJECTION, SOLUTION INTRAVENOUS CONTINUOUS PRN
Status: DISCONTINUED | OUTPATIENT
Start: 2025-07-22 | End: 2025-07-22

## 2025-07-22 RX ORDER — IODIXANOL 320 MG/ML
INJECTION, SOLUTION INTRAVASCULAR AS NEEDED
Status: DISCONTINUED | OUTPATIENT
Start: 2025-07-22 | End: 2025-07-22 | Stop reason: HOSPADM

## 2025-07-22 RX ORDER — PROPOFOL 10 MG/ML
INJECTION, EMULSION INTRAVENOUS AS NEEDED
Status: DISCONTINUED | OUTPATIENT
Start: 2025-07-22 | End: 2025-07-22

## 2025-07-22 RX ORDER — MAGNESIUM HYDROXIDE 1200 MG/15ML
LIQUID ORAL AS NEEDED
Status: DISCONTINUED | OUTPATIENT
Start: 2025-07-22 | End: 2025-07-22 | Stop reason: HOSPADM

## 2025-07-22 RX ORDER — ALBUTEROL SULFATE 90 UG/1
INHALANT RESPIRATORY (INHALATION) AS NEEDED
Status: DISCONTINUED | OUTPATIENT
Start: 2025-07-22 | End: 2025-07-22

## 2025-07-22 RX ORDER — LABETALOL HYDROCHLORIDE 5 MG/ML
INJECTION, SOLUTION INTRAVENOUS AS NEEDED
Status: DISCONTINUED | OUTPATIENT
Start: 2025-07-22 | End: 2025-07-22

## 2025-07-22 RX ORDER — FENTANYL CITRATE/PF 50 MCG/ML
25 SYRINGE (ML) INJECTION
Status: DISCONTINUED | OUTPATIENT
Start: 2025-07-22 | End: 2025-07-22 | Stop reason: HOSPADM

## 2025-07-22 RX ORDER — HYDROMORPHONE HYDROCHLORIDE 2 MG/1
4 TABLET ORAL EVERY 4 HOURS PRN
Status: DISCONTINUED | OUTPATIENT
Start: 2025-07-22 | End: 2025-07-25

## 2025-07-22 RX ORDER — HYDROMORPHONE HCL IN WATER/PF 6 MG/30 ML
0.2 PATIENT CONTROLLED ANALGESIA SYRINGE INTRAVENOUS
Status: DISCONTINUED | OUTPATIENT
Start: 2025-07-22 | End: 2025-07-22 | Stop reason: HOSPADM

## 2025-07-22 RX ORDER — DEXAMETHASONE SODIUM PHOSPHATE 10 MG/ML
INJECTION, SOLUTION INTRAMUSCULAR; INTRAVENOUS AS NEEDED
Status: DISCONTINUED | OUTPATIENT
Start: 2025-07-22 | End: 2025-07-22

## 2025-07-22 RX ORDER — HYDROMORPHONE HYDROCHLORIDE 2 MG/ML
INJECTION, SOLUTION INTRAMUSCULAR; INTRAVENOUS; SUBCUTANEOUS AS NEEDED
Status: DISCONTINUED | OUTPATIENT
Start: 2025-07-22 | End: 2025-07-22

## 2025-07-22 RX ORDER — LIDOCAINE HYDROCHLORIDE 10 MG/ML
INJECTION, SOLUTION EPIDURAL; INFILTRATION; INTRACAUDAL; PERINEURAL AS NEEDED
Status: DISCONTINUED | OUTPATIENT
Start: 2025-07-22 | End: 2025-07-22

## 2025-07-22 RX ORDER — PROTAMINE SULFATE 10 MG/ML
INJECTION, SOLUTION INTRAVENOUS AS NEEDED
Status: DISCONTINUED | OUTPATIENT
Start: 2025-07-22 | End: 2025-07-22

## 2025-07-22 RX ORDER — INSULIN LISPRO 100 [IU]/ML
2-12 INJECTION, SOLUTION INTRAVENOUS; SUBCUTANEOUS
Status: DISCONTINUED | OUTPATIENT
Start: 2025-07-23 | End: 2025-07-23

## 2025-07-22 RX ORDER — HYDROMORPHONE HYDROCHLORIDE 2 MG/1
2 TABLET ORAL EVERY 4 HOURS PRN
Status: DISCONTINUED | OUTPATIENT
Start: 2025-07-22 | End: 2025-07-25

## 2025-07-22 RX ORDER — FENTANYL CITRATE 50 UG/ML
INJECTION, SOLUTION INTRAMUSCULAR; INTRAVENOUS AS NEEDED
Status: DISCONTINUED | OUTPATIENT
Start: 2025-07-22 | End: 2025-07-22

## 2025-07-22 RX ORDER — HEPARIN SODIUM 1000 [USP'U]/ML
INJECTION, SOLUTION INTRAVENOUS; SUBCUTANEOUS AS NEEDED
Status: DISCONTINUED | OUTPATIENT
Start: 2025-07-22 | End: 2025-07-22

## 2025-07-22 RX ORDER — HEPARIN SODIUM 5000 [USP'U]/ML
5000 INJECTION, SOLUTION INTRAVENOUS; SUBCUTANEOUS EVERY 8 HOURS SCHEDULED
Status: DISCONTINUED | OUTPATIENT
Start: 2025-07-22 | End: 2025-07-23

## 2025-07-22 RX ORDER — MIDAZOLAM HYDROCHLORIDE 2 MG/2ML
INJECTION, SOLUTION INTRAMUSCULAR; INTRAVENOUS CONTINUOUS PRN
Status: DISCONTINUED | OUTPATIENT
Start: 2025-07-22 | End: 2025-07-22

## 2025-07-22 RX ADMIN — HEPARIN SODIUM 9000 UNITS: 1000 INJECTION, SOLUTION INTRAVENOUS; SUBCUTANEOUS at 13:53

## 2025-07-22 RX ADMIN — NICARDIPINE HYDROCHLORIDE 300 MCG: 2.5 INJECTION, SOLUTION INTRAVENOUS at 17:27

## 2025-07-22 RX ADMIN — HYDROMORPHONE HYDROCHLORIDE 4 MG: 2 TABLET ORAL at 20:39

## 2025-07-22 RX ADMIN — PROPOFOL 30 MG: 10 INJECTION, EMULSION INTRAVENOUS at 11:17

## 2025-07-22 RX ADMIN — PRAZOSIN HYDROCHLORIDE 1 MG: 1 CAPSULE ORAL at 21:51

## 2025-07-22 RX ADMIN — INSULIN LISPRO 5 UNITS: 100 INJECTION, SOLUTION INTRAVENOUS; SUBCUTANEOUS at 21:51

## 2025-07-22 RX ADMIN — SENNOSIDES, DOCUSATE SODIUM 1 TABLET: 50; 8.6 TABLET, FILM COATED ORAL at 09:36

## 2025-07-22 RX ADMIN — HEPARIN SODIUM 5000 UNITS: 5000 INJECTION INTRAVENOUS; SUBCUTANEOUS at 21:51

## 2025-07-22 RX ADMIN — CYCLOBENZAPRINE HYDROCHLORIDE 10 MG: 10 TABLET, FILM COATED ORAL at 09:36

## 2025-07-22 RX ADMIN — INSULIN GLARGINE 40 UNITS: 100 INJECTION, SOLUTION SUBCUTANEOUS at 21:51

## 2025-07-22 RX ADMIN — HYDROMORPHONE HYDROCHLORIDE 0.5 MG: 2 INJECTION, SOLUTION INTRAMUSCULAR; INTRAVENOUS; SUBCUTANEOUS at 16:48

## 2025-07-22 RX ADMIN — PHENYLEPHRINE HYDROCHLORIDE 30 MCG/MIN: 50 INJECTION INTRAVENOUS at 11:42

## 2025-07-22 RX ADMIN — OXYCODONE HYDROCHLORIDE 15 MG: 10 TABLET ORAL at 02:47

## 2025-07-22 RX ADMIN — SUGAMMADEX 200 MG: 100 INJECTION, SOLUTION INTRAVENOUS at 17:17

## 2025-07-22 RX ADMIN — NICOTINE 1 PATCH: 21 PATCH, EXTENDED RELEASE TRANSDERMAL at 09:40

## 2025-07-22 RX ADMIN — HYDROMORPHONE HYDROCHLORIDE 0.5 MG: 1 INJECTION, SOLUTION INTRAMUSCULAR; INTRAVENOUS; SUBCUTANEOUS at 03:47

## 2025-07-22 RX ADMIN — FENTANYL CITRATE 50 MCG: 50 INJECTION INTRAMUSCULAR; INTRAVENOUS at 14:36

## 2025-07-22 RX ADMIN — GABAPENTIN 600 MG: 300 CAPSULE ORAL at 21:51

## 2025-07-22 RX ADMIN — AMLODIPINE BESYLATE 5 MG: 5 TABLET ORAL at 09:36

## 2025-07-22 RX ADMIN — PROTAMINE SULFATE 40 MG: 10 INJECTION, SOLUTION INTRAVENOUS at 15:50

## 2025-07-22 RX ADMIN — ONDANSETRON 4 MG: 2 INJECTION INTRAMUSCULAR; INTRAVENOUS at 16:00

## 2025-07-22 RX ADMIN — GABAPENTIN 600 MG: 300 CAPSULE ORAL at 09:36

## 2025-07-22 RX ADMIN — ARIPIPRAZOLE 5 MG: 5 TABLET ORAL at 09:39

## 2025-07-22 RX ADMIN — ROCURONIUM BROMIDE 20 MG: 10 INJECTION, SOLUTION INTRAVENOUS at 14:11

## 2025-07-22 RX ADMIN — FENTANYL CITRATE 50 MCG: 50 INJECTION INTRAMUSCULAR; INTRAVENOUS at 13:48

## 2025-07-22 RX ADMIN — SODIUM CHLORIDE, SODIUM LACTATE, POTASSIUM CHLORIDE, AND CALCIUM CHLORIDE: .6; .31; .03; .02 INJECTION, SOLUTION INTRAVENOUS at 14:55

## 2025-07-22 RX ADMIN — ALBUTEROL SULFATE 10 PUFF: 90 AEROSOL, METERED RESPIRATORY (INHALATION) at 16:12

## 2025-07-22 RX ADMIN — LIDOCAINE HYDROCHLORIDE 50 MG: 10 INJECTION, SOLUTION EPIDURAL; INFILTRATION; INTRACAUDAL; PERINEURAL at 10:56

## 2025-07-22 RX ADMIN — ROCURONIUM BROMIDE 10 MG: 10 INJECTION, SOLUTION INTRAVENOUS at 12:06

## 2025-07-22 RX ADMIN — DULOXETINE 60 MG: 60 CAPSULE, DELAYED RELEASE ORAL at 09:36

## 2025-07-22 RX ADMIN — ASPIRIN 81 MG CHEWABLE TABLET 81 MG: 81 TABLET CHEWABLE at 09:36

## 2025-07-22 RX ADMIN — CHLORHEXIDINE GLUCONATE 15 ML: 1.2 SOLUTION ORAL at 06:42

## 2025-07-22 RX ADMIN — OXYCODONE HYDROCHLORIDE 15 MG: 10 TABLET ORAL at 07:40

## 2025-07-22 RX ADMIN — CEFAZOLIN SODIUM 2000 MG: 2 SOLUTION INTRAVENOUS at 11:30

## 2025-07-22 RX ADMIN — LABETALOL HYDROCHLORIDE 10 MG: 5 INJECTION, SOLUTION INTRAVENOUS at 17:31

## 2025-07-22 RX ADMIN — FENTANYL CITRATE 50 MCG: 50 INJECTION INTRAMUSCULAR; INTRAVENOUS at 10:56

## 2025-07-22 RX ADMIN — INSULIN LISPRO 10 UNITS: 100 INJECTION, SOLUTION INTRAVENOUS; SUBCUTANEOUS at 20:06

## 2025-07-22 RX ADMIN — LORAZEPAM 1 MG: 2 INJECTION INTRAMUSCULAR; INTRAVENOUS at 23:37

## 2025-07-22 RX ADMIN — ROCURONIUM BROMIDE 50 MG: 10 INJECTION, SOLUTION INTRAVENOUS at 10:56

## 2025-07-22 RX ADMIN — PROPOFOL 200 MG: 10 INJECTION, EMULSION INTRAVENOUS at 10:56

## 2025-07-22 RX ADMIN — DEXAMETHASONE SODIUM PHOSPHATE 10 MG: 10 INJECTION, SOLUTION INTRAMUSCULAR; INTRAVENOUS at 10:56

## 2025-07-22 RX ADMIN — FENTANYL CITRATE 25 MCG: 50 INJECTION INTRAMUSCULAR; INTRAVENOUS at 16:34

## 2025-07-22 RX ADMIN — HEPARIN SODIUM 2500 UNITS: 1000 INJECTION, SOLUTION INTRAVENOUS; SUBCUTANEOUS at 14:53

## 2025-07-22 RX ADMIN — HYDROMORPHONE HYDROCHLORIDE 0.5 MG: 1 INJECTION, SOLUTION INTRAMUSCULAR; INTRAVENOUS; SUBCUTANEOUS at 08:49

## 2025-07-22 RX ADMIN — FENTANYL CITRATE 25 MCG: 50 INJECTION INTRAMUSCULAR; INTRAVENOUS at 16:00

## 2025-07-22 RX ADMIN — CYCLOBENZAPRINE HYDROCHLORIDE 10 MG: 10 TABLET, FILM COATED ORAL at 20:06

## 2025-07-22 RX ADMIN — ACETAMINOPHEN 975 MG: 325 TABLET ORAL at 06:37

## 2025-07-22 RX ADMIN — INSULIN LISPRO 4 UNITS: 100 INJECTION, SOLUTION INTRAVENOUS; SUBCUTANEOUS at 06:41

## 2025-07-22 RX ADMIN — SODIUM CHLORIDE, SODIUM LACTATE, POTASSIUM CHLORIDE, AND CALCIUM CHLORIDE: .6; .31; .03; .02 INJECTION, SOLUTION INTRAVENOUS at 10:35

## 2025-07-22 RX ADMIN — HEPARIN SODIUM 16.1 UNITS/KG/HR: 10000 INJECTION, SOLUTION INTRAVENOUS at 08:51

## 2025-07-22 RX ADMIN — KETAMINE HYDROCHLORIDE 0.1 MG/KG/HR: 100 INJECTION INTRAMUSCULAR; INTRAVENOUS at 09:45

## 2025-07-22 RX ADMIN — ACETAMINOPHEN 975 MG: 325 TABLET ORAL at 21:51

## 2025-07-22 RX ADMIN — HYDROMORPHONE HYDROCHLORIDE 0.5 MG: 1 INJECTION, SOLUTION INTRAMUSCULAR; INTRAVENOUS; SUBCUTANEOUS at 00:43

## 2025-07-22 NOTE — PROGRESS NOTES
Chart reviewed. Patient remains admitted as of 7/15 and transferred to [facility] to accommodate LE angiogram sooner than [facility] would be able to accommodate.     [facility] will continue to follow through d/c.

## 2025-07-22 NOTE — ANESTHESIA POSTPROCEDURE EVALUATION
Post-Op Assessment Note    CV Status:  Stable  Pain Score: 0    Pain management: adequate       Mental Status:  Alert   Hydration Status:  Stable   PONV Controlled:  None   Airway Patency:  Patent  Two or more mitigation strategies used for obstructive sleep apnea  No anethesia notable event occurred.    Staff: CRNA, Anesthesiologist           Last Filed PACU Vitals:  Vitals Value Taken Time   Temp 98.2 °F (36.8 °C) 07/22/25 17:40   Pulse 109 07/22/25 17:43   /96 07/22/25 17:40   Resp 37 07/22/25 17:43   SpO2 99 % 07/22/25 17:43   Vitals shown include unfiled device data.

## 2025-07-22 NOTE — ANESTHESIA PROCEDURE NOTES
Arterial Line Insertion    Performed by: Wood Witt CRNA  Authorized by: Brigido Salas DO  Consent: Written consent obtained  Patient identity confirmed: verbally with patient and arm band  Preparation: Patient was prepped and draped in the usual sterile fashion.  Indications: multiple ABGs and hemodynamic monitoring  Orientation:  Right  Location: radial artery  Procedure Details:      Line Type: Arterial Line  Chandra's test normal: yes  Needle gauge: 20  Placement technique:  Anatomical landmarks  Number of attempts: 1    Post-procedure:  Post-procedure: dressing applied  Waveform: good waveform  Post-procedure CNS: normal

## 2025-07-23 ENCOUNTER — VBI (OUTPATIENT)
Dept: ADMINISTRATIVE | Facility: OTHER | Age: 40
End: 2025-07-23

## 2025-07-23 LAB
ABO GROUP BLD BPU: NORMAL
ABO GROUP BLD BPU: NORMAL
ANION GAP SERPL CALCULATED.3IONS-SCNC: 10 MMOL/L (ref 4–13)
BASOPHILS # BLD AUTO: 0.02 THOUSANDS/ÂΜL (ref 0–0.1)
BASOPHILS NFR BLD AUTO: 0 % (ref 0–1)
BPU ID: NORMAL
BPU ID: NORMAL
BUN SERPL-MCNC: 16 MG/DL (ref 5–25)
CALCIUM SERPL-MCNC: 8.1 MG/DL (ref 8.4–10.2)
CHLORIDE SERPL-SCNC: 101 MMOL/L (ref 96–108)
CO2 SERPL-SCNC: 26 MMOL/L (ref 21–32)
CREAT SERPL-MCNC: 0.55 MG/DL (ref 0.6–1.3)
CROSSMATCH: NORMAL
CROSSMATCH: NORMAL
EOSINOPHIL # BLD AUTO: 0 THOUSAND/ÂΜL (ref 0–0.61)
EOSINOPHIL NFR BLD AUTO: 0 % (ref 0–6)
ERYTHROCYTE [DISTWIDTH] IN BLOOD BY AUTOMATED COUNT: 15.9 % (ref 11.6–15.1)
GFR SERPL CREATININE-BSD FRML MDRD: 119 ML/MIN/1.73SQ M
GLUCOSE SERPL-MCNC: 160 MG/DL (ref 65–140)
GLUCOSE SERPL-MCNC: 197 MG/DL (ref 65–140)
GLUCOSE SERPL-MCNC: 217 MG/DL (ref 65–140)
GLUCOSE SERPL-MCNC: 218 MG/DL (ref 65–140)
GLUCOSE SERPL-MCNC: 222 MG/DL (ref 65–140)
GLUCOSE SERPL-MCNC: 277 MG/DL (ref 65–140)
GLUCOSE SERPL-MCNC: 281 MG/DL (ref 65–140)
GLUCOSE SERPL-MCNC: 335 MG/DL (ref 65–140)
GLUCOSE SERPL-MCNC: 366 MG/DL (ref 65–140)
HCT VFR BLD AUTO: 31 % (ref 34.8–46.1)
HGB BLD-MCNC: 10.6 G/DL (ref 11.5–15.4)
IMM GRANULOCYTES # BLD AUTO: 0.1 THOUSAND/UL (ref 0–0.2)
IMM GRANULOCYTES NFR BLD AUTO: 1 % (ref 0–2)
LYMPHOCYTES # BLD AUTO: 1.46 THOUSANDS/ÂΜL (ref 0.6–4.47)
LYMPHOCYTES NFR BLD AUTO: 11 % (ref 14–44)
MCH RBC QN AUTO: 26.2 PG (ref 26.8–34.3)
MCHC RBC AUTO-ENTMCNC: 34.2 G/DL (ref 31.4–37.4)
MCV RBC AUTO: 77 FL (ref 82–98)
MONOCYTES # BLD AUTO: 0.96 THOUSAND/ÂΜL (ref 0.17–1.22)
MONOCYTES NFR BLD AUTO: 7 % (ref 4–12)
NEUTROPHILS # BLD AUTO: 10.55 THOUSANDS/ÂΜL (ref 1.85–7.62)
NEUTS SEG NFR BLD AUTO: 81 % (ref 43–75)
NRBC BLD AUTO-RTO: 0 /100 WBCS
PLATELET # BLD AUTO: 253 THOUSANDS/UL (ref 149–390)
PMV BLD AUTO: 10.6 FL (ref 8.9–12.7)
POTASSIUM SERPL-SCNC: 4.5 MMOL/L (ref 3.5–5.3)
RBC # BLD AUTO: 4.04 MILLION/UL (ref 3.81–5.12)
SODIUM SERPL-SCNC: 137 MMOL/L (ref 135–147)
UNIT DISPENSE STATUS: NORMAL
UNIT DISPENSE STATUS: NORMAL
UNIT PRODUCT CODE: NORMAL
UNIT PRODUCT CODE: NORMAL
UNIT PRODUCT VOLUME: 350 ML
UNIT PRODUCT VOLUME: 350 ML
UNIT RH: NORMAL
UNIT RH: NORMAL
WBC # BLD AUTO: 13.09 THOUSAND/UL (ref 4.31–10.16)

## 2025-07-23 PROCEDURE — 80048 BASIC METABOLIC PNL TOTAL CA: CPT | Performed by: SURGERY

## 2025-07-23 PROCEDURE — 82948 REAGENT STRIP/BLOOD GLUCOSE: CPT

## 2025-07-23 PROCEDURE — 99024 POSTOP FOLLOW-UP VISIT: CPT | Performed by: SURGERY

## 2025-07-23 PROCEDURE — 85025 COMPLETE CBC W/AUTO DIFF WBC: CPT | Performed by: SURGERY

## 2025-07-23 PROCEDURE — 99232 SBSQ HOSP IP/OBS MODERATE 35: CPT | Performed by: INTERNAL MEDICINE

## 2025-07-23 PROCEDURE — 99232 SBSQ HOSP IP/OBS MODERATE 35: CPT | Performed by: FAMILY MEDICINE

## 2025-07-23 PROCEDURE — 99233 SBSQ HOSP IP/OBS HIGH 50: CPT | Performed by: ANESTHESIOLOGY

## 2025-07-23 RX ORDER — RIVAROXABAN 2.5 MG/1
2.5 TABLET, FILM COATED ORAL 2 TIMES DAILY
Qty: 60 TABLET | Refills: 3 | Status: ON HOLD | OUTPATIENT
Start: 2025-07-23

## 2025-07-23 RX ORDER — RIVAROXABAN 2.5 MG/1
2.5 TABLET, FILM COATED ORAL 2 TIMES DAILY WITH MEALS
Status: DISCONTINUED | OUTPATIENT
Start: 2025-07-23 | End: 2025-07-27 | Stop reason: HOSPADM

## 2025-07-23 RX ORDER — RIVAROXABAN 2.5 MG/1
2.5 TABLET, FILM COATED ORAL 2 TIMES DAILY
Qty: 60 TABLET | Refills: 2 | Status: SHIPPED | OUTPATIENT
Start: 2025-07-23 | End: 2025-07-23

## 2025-07-23 RX ORDER — METOPROLOL TARTRATE 25 MG/1
25 TABLET, FILM COATED ORAL EVERY 12 HOURS SCHEDULED
Status: DISCONTINUED | OUTPATIENT
Start: 2025-07-23 | End: 2025-07-27 | Stop reason: HOSPADM

## 2025-07-23 RX ORDER — LISINOPRIL 20 MG/1
20 TABLET ORAL DAILY
Status: DISCONTINUED | OUTPATIENT
Start: 2025-07-23 | End: 2025-07-27 | Stop reason: HOSPADM

## 2025-07-23 RX ADMIN — AMLODIPINE BESYLATE 5 MG: 5 TABLET ORAL at 08:07

## 2025-07-23 RX ADMIN — DULOXETINE 60 MG: 60 CAPSULE, DELAYED RELEASE ORAL at 08:07

## 2025-07-23 RX ADMIN — GABAPENTIN 600 MG: 300 CAPSULE ORAL at 21:00

## 2025-07-23 RX ADMIN — INSULIN LISPRO 10 UNITS: 100 INJECTION, SOLUTION INTRAVENOUS; SUBCUTANEOUS at 08:10

## 2025-07-23 RX ADMIN — NICOTINE 1 PATCH: 21 PATCH, EXTENDED RELEASE TRANSDERMAL at 08:07

## 2025-07-23 RX ADMIN — HYDROMORPHONE HYDROCHLORIDE 4 MG: 2 TABLET ORAL at 01:02

## 2025-07-23 RX ADMIN — GABAPENTIN 600 MG: 300 CAPSULE ORAL at 18:16

## 2025-07-23 RX ADMIN — HEPARIN SODIUM 5000 UNITS: 5000 INJECTION INTRAVENOUS; SUBCUTANEOUS at 06:35

## 2025-07-23 RX ADMIN — SENNOSIDES, DOCUSATE SODIUM 1 TABLET: 50; 8.6 TABLET, FILM COATED ORAL at 21:00

## 2025-07-23 RX ADMIN — ACETAMINOPHEN 975 MG: 325 TABLET ORAL at 06:40

## 2025-07-23 RX ADMIN — INSULIN LISPRO 10 UNITS: 100 INJECTION, SOLUTION INTRAVENOUS; SUBCUTANEOUS at 11:17

## 2025-07-23 RX ADMIN — GABAPENTIN 600 MG: 300 CAPSULE ORAL at 08:08

## 2025-07-23 RX ADMIN — INSULIN LISPRO 10 UNITS: 100 INJECTION, SOLUTION INTRAVENOUS; SUBCUTANEOUS at 08:09

## 2025-07-23 RX ADMIN — HYDROMORPHONE HYDROCHLORIDE 4 MG: 2 TABLET ORAL at 06:39

## 2025-07-23 RX ADMIN — CYCLOBENZAPRINE HYDROCHLORIDE 10 MG: 10 TABLET, FILM COATED ORAL at 21:00

## 2025-07-23 RX ADMIN — ACETAMINOPHEN 975 MG: 325 TABLET ORAL at 13:20

## 2025-07-23 RX ADMIN — INSULIN LISPRO 6 UNITS: 100 INJECTION, SOLUTION INTRAVENOUS; SUBCUTANEOUS at 11:17

## 2025-07-23 RX ADMIN — SENNOSIDES, DOCUSATE SODIUM 1 TABLET: 50; 8.6 TABLET, FILM COATED ORAL at 08:07

## 2025-07-23 RX ADMIN — CYCLOBENZAPRINE HYDROCHLORIDE 10 MG: 10 TABLET, FILM COATED ORAL at 08:07

## 2025-07-23 RX ADMIN — HYDROMORPHONE HYDROCHLORIDE 0.5 MG: 1 INJECTION, SOLUTION INTRAMUSCULAR; INTRAVENOUS; SUBCUTANEOUS at 18:16

## 2025-07-23 RX ADMIN — LISINOPRIL 20 MG: 20 TABLET ORAL at 12:26

## 2025-07-23 RX ADMIN — HYDROMORPHONE HYDROCHLORIDE 0.5 MG: 1 INJECTION, SOLUTION INTRAMUSCULAR; INTRAVENOUS; SUBCUTANEOUS at 12:26

## 2025-07-23 RX ADMIN — HYDROMORPHONE HYDROCHLORIDE 0.5 MG: 1 INJECTION, SOLUTION INTRAMUSCULAR; INTRAVENOUS; SUBCUTANEOUS at 04:20

## 2025-07-23 RX ADMIN — RIVAROXABAN 2.5 MG: 2.5 TABLET, FILM COATED ORAL at 13:20

## 2025-07-23 RX ADMIN — GABAPENTIN 600 MG: 300 CAPSULE ORAL at 11:17

## 2025-07-23 RX ADMIN — METOPROLOL TARTRATE 25 MG: 25 TABLET, FILM COATED ORAL at 14:14

## 2025-07-23 RX ADMIN — ASPIRIN 81 MG CHEWABLE TABLET 81 MG: 81 TABLET CHEWABLE at 08:07

## 2025-07-23 RX ADMIN — ARIPIPRAZOLE 5 MG: 5 TABLET ORAL at 08:11

## 2025-07-23 RX ADMIN — HYDROMORPHONE HYDROCHLORIDE 4 MG: 2 TABLET ORAL at 22:46

## 2025-07-23 RX ADMIN — KETAMINE HYDROCHLORIDE 0.2 MG/KG/HR: 100 INJECTION INTRAMUSCULAR; INTRAVENOUS at 10:32

## 2025-07-23 RX ADMIN — ATORVASTATIN CALCIUM 80 MG: 80 TABLET, FILM COATED ORAL at 16:03

## 2025-07-23 RX ADMIN — LACTULOSE 20 G: 20 SOLUTION ORAL at 08:07

## 2025-07-23 RX ADMIN — HYDROMORPHONE HYDROCHLORIDE 0.5 MG: 1 INJECTION, SOLUTION INTRAMUSCULAR; INTRAVENOUS; SUBCUTANEOUS at 08:06

## 2025-07-23 RX ADMIN — HYDROMORPHONE HYDROCHLORIDE 0.5 MG: 1 INJECTION, SOLUTION INTRAMUSCULAR; INTRAVENOUS; SUBCUTANEOUS at 15:17

## 2025-07-23 RX ADMIN — CYCLOBENZAPRINE HYDROCHLORIDE 10 MG: 10 TABLET, FILM COATED ORAL at 15:17

## 2025-07-23 RX ADMIN — RIVAROXABAN 2.5 MG: 2.5 TABLET, FILM COATED ORAL at 18:17

## 2025-07-23 RX ADMIN — HYDROMORPHONE HYDROCHLORIDE 4 MG: 2 TABLET ORAL at 10:29

## 2025-07-23 RX ADMIN — ACETAMINOPHEN 975 MG: 325 TABLET ORAL at 21:00

## 2025-07-23 RX ADMIN — KETAMINE HYDROCHLORIDE 0.2 MG/KG/HR: 100 INJECTION INTRAMUSCULAR; INTRAVENOUS at 22:46

## 2025-07-23 RX ADMIN — SODIUM CHLORIDE 7 UNITS/HR: 9 INJECTION, SOLUTION INTRAVENOUS at 14:06

## 2025-07-23 NOTE — TELEPHONE ENCOUNTER
07/23/25 8:06 AM     Chart reviewed for Diabetic Eye Exam ; nothing is submitted to the patient's insurance at this time.     Al Estevez MA   [facility]

## 2025-07-24 LAB
ANION GAP SERPL CALCULATED.3IONS-SCNC: 4 MMOL/L (ref 4–13)
BUN SERPL-MCNC: 15 MG/DL (ref 5–25)
CALCIUM SERPL-MCNC: 8.1 MG/DL (ref 8.4–10.2)
CHLORIDE SERPL-SCNC: 102 MMOL/L (ref 96–108)
CO2 SERPL-SCNC: 28 MMOL/L (ref 21–32)
CREAT SERPL-MCNC: 0.52 MG/DL (ref 0.6–1.3)
ERYTHROCYTE [DISTWIDTH] IN BLOOD BY AUTOMATED COUNT: 15.7 % (ref 11.6–15.1)
GFR SERPL CREATININE-BSD FRML MDRD: 121 ML/MIN/1.73SQ M
GLUCOSE SERPL-MCNC: 108 MG/DL (ref 65–140)
GLUCOSE SERPL-MCNC: 128 MG/DL (ref 65–140)
GLUCOSE SERPL-MCNC: 148 MG/DL (ref 65–140)
GLUCOSE SERPL-MCNC: 160 MG/DL (ref 65–140)
GLUCOSE SERPL-MCNC: 172 MG/DL (ref 65–140)
GLUCOSE SERPL-MCNC: 175 MG/DL (ref 65–140)
GLUCOSE SERPL-MCNC: 181 MG/DL (ref 65–140)
GLUCOSE SERPL-MCNC: 203 MG/DL (ref 65–140)
GLUCOSE SERPL-MCNC: 205 MG/DL (ref 65–140)
GLUCOSE SERPL-MCNC: 210 MG/DL (ref 65–140)
GLUCOSE SERPL-MCNC: 276 MG/DL (ref 65–140)
GLUCOSE SERPL-MCNC: 88 MG/DL (ref 65–140)
HCT VFR BLD AUTO: 27.3 % (ref 34.8–46.1)
HGB BLD-MCNC: 9.3 G/DL (ref 11.5–15.4)
MCH RBC QN AUTO: 26.3 PG (ref 26.8–34.3)
MCHC RBC AUTO-ENTMCNC: 34.1 G/DL (ref 31.4–37.4)
MCV RBC AUTO: 77 FL (ref 82–98)
PLATELET # BLD AUTO: 334 THOUSANDS/UL (ref 149–390)
PMV BLD AUTO: 10.4 FL (ref 8.9–12.7)
POTASSIUM SERPL-SCNC: 4.3 MMOL/L (ref 3.5–5.3)
RBC # BLD AUTO: 3.54 MILLION/UL (ref 3.81–5.12)
SODIUM SERPL-SCNC: 134 MMOL/L (ref 135–147)
WBC # BLD AUTO: 12.81 THOUSAND/UL (ref 4.31–10.16)

## 2025-07-24 PROCEDURE — 99233 SBSQ HOSP IP/OBS HIGH 50: CPT | Performed by: NURSE PRACTITIONER

## 2025-07-24 PROCEDURE — 99232 SBSQ HOSP IP/OBS MODERATE 35: CPT | Performed by: INTERNAL MEDICINE

## 2025-07-24 PROCEDURE — 99024 POSTOP FOLLOW-UP VISIT: CPT | Performed by: SURGERY

## 2025-07-24 PROCEDURE — 97163 PT EVAL HIGH COMPLEX 45 MIN: CPT

## 2025-07-24 PROCEDURE — 80048 BASIC METABOLIC PNL TOTAL CA: CPT | Performed by: PHYSICIAN ASSISTANT

## 2025-07-24 PROCEDURE — 99232 SBSQ HOSP IP/OBS MODERATE 35: CPT | Performed by: FAMILY MEDICINE

## 2025-07-24 PROCEDURE — 82948 REAGENT STRIP/BLOOD GLUCOSE: CPT

## 2025-07-24 PROCEDURE — 97167 OT EVAL HIGH COMPLEX 60 MIN: CPT

## 2025-07-24 PROCEDURE — 85027 COMPLETE CBC AUTOMATED: CPT | Performed by: PHYSICIAN ASSISTANT

## 2025-07-24 RX ORDER — LORAZEPAM 2 MG/ML
0.5 INJECTION INTRAMUSCULAR EVERY 4 HOURS PRN
Status: DISCONTINUED | OUTPATIENT
Start: 2025-07-24 | End: 2025-07-25

## 2025-07-24 RX ADMIN — ACETAMINOPHEN 975 MG: 325 TABLET ORAL at 13:32

## 2025-07-24 RX ADMIN — HYDROMORPHONE HYDROCHLORIDE 4 MG: 2 TABLET ORAL at 10:31

## 2025-07-24 RX ADMIN — AMLODIPINE BESYLATE 5 MG: 5 TABLET ORAL at 08:18

## 2025-07-24 RX ADMIN — RIVAROXABAN 2.5 MG: 2.5 TABLET, FILM COATED ORAL at 17:06

## 2025-07-24 RX ADMIN — METOPROLOL TARTRATE 25 MG: 25 TABLET, FILM COATED ORAL at 08:18

## 2025-07-24 RX ADMIN — NICOTINE 1 PATCH: 21 PATCH, EXTENDED RELEASE TRANSDERMAL at 08:18

## 2025-07-24 RX ADMIN — ATORVASTATIN CALCIUM 80 MG: 80 TABLET, FILM COATED ORAL at 17:04

## 2025-07-24 RX ADMIN — GABAPENTIN 600 MG: 300 CAPSULE ORAL at 17:04

## 2025-07-24 RX ADMIN — ARIPIPRAZOLE 5 MG: 5 TABLET ORAL at 08:28

## 2025-07-24 RX ADMIN — SODIUM CHLORIDE 9 UNITS/HR: 9 INJECTION, SOLUTION INTRAVENOUS at 01:28

## 2025-07-24 RX ADMIN — PRAZOSIN HYDROCHLORIDE 1 MG: 1 CAPSULE ORAL at 21:00

## 2025-07-24 RX ADMIN — SENNOSIDES, DOCUSATE SODIUM 1 TABLET: 50; 8.6 TABLET, FILM COATED ORAL at 20:58

## 2025-07-24 RX ADMIN — RIVAROXABAN 2.5 MG: 2.5 TABLET, FILM COATED ORAL at 08:28

## 2025-07-24 RX ADMIN — HYDROMORPHONE HYDROCHLORIDE 0.5 MG: 1 INJECTION, SOLUTION INTRAMUSCULAR; INTRAVENOUS; SUBCUTANEOUS at 08:17

## 2025-07-24 RX ADMIN — HYDROMORPHONE HYDROCHLORIDE 4 MG: 2 TABLET ORAL at 14:55

## 2025-07-24 RX ADMIN — HYDROMORPHONE HYDROCHLORIDE 4 MG: 2 TABLET ORAL at 06:23

## 2025-07-24 RX ADMIN — HYDROMORPHONE HYDROCHLORIDE 0.5 MG: 1 INJECTION, SOLUTION INTRAMUSCULAR; INTRAVENOUS; SUBCUTANEOUS at 13:46

## 2025-07-24 RX ADMIN — ASPIRIN 81 MG CHEWABLE TABLET 81 MG: 81 TABLET CHEWABLE at 08:18

## 2025-07-24 RX ADMIN — ACETAMINOPHEN 975 MG: 325 TABLET ORAL at 21:00

## 2025-07-24 RX ADMIN — HYDROMORPHONE HYDROCHLORIDE 4 MG: 2 TABLET ORAL at 20:58

## 2025-07-24 RX ADMIN — KETAMINE HYDROCHLORIDE 0.1 MG/KG/HR: 100 INJECTION INTRAMUSCULAR; INTRAVENOUS at 15:02

## 2025-07-24 RX ADMIN — GABAPENTIN 600 MG: 300 CAPSULE ORAL at 13:32

## 2025-07-24 RX ADMIN — ACETAMINOPHEN 975 MG: 325 TABLET ORAL at 06:24

## 2025-07-24 RX ADMIN — GABAPENTIN 600 MG: 300 CAPSULE ORAL at 21:01

## 2025-07-24 RX ADMIN — CYCLOBENZAPRINE HYDROCHLORIDE 10 MG: 10 TABLET, FILM COATED ORAL at 08:18

## 2025-07-24 RX ADMIN — HYDROMORPHONE HYDROCHLORIDE 0.5 MG: 1 INJECTION, SOLUTION INTRAMUSCULAR; INTRAVENOUS; SUBCUTANEOUS at 22:38

## 2025-07-24 RX ADMIN — DULOXETINE 60 MG: 60 CAPSULE, DELAYED RELEASE ORAL at 08:18

## 2025-07-24 RX ADMIN — LISINOPRIL 20 MG: 20 TABLET ORAL at 08:18

## 2025-07-24 RX ADMIN — GABAPENTIN 600 MG: 300 CAPSULE ORAL at 08:18

## 2025-07-24 RX ADMIN — CYCLOBENZAPRINE HYDROCHLORIDE 10 MG: 10 TABLET, FILM COATED ORAL at 20:58

## 2025-07-24 RX ADMIN — HYDROMORPHONE HYDROCHLORIDE 0.5 MG: 1 INJECTION, SOLUTION INTRAMUSCULAR; INTRAVENOUS; SUBCUTANEOUS at 17:04

## 2025-07-24 RX ADMIN — CYCLOBENZAPRINE HYDROCHLORIDE 10 MG: 10 TABLET, FILM COATED ORAL at 14:55

## 2025-07-24 NOTE — ANESTHESIA POSTPROCEDURE EVALUATION
Post-Op Assessment Note    CV Status:  Stable    Pain management: adequate    Multimodal analgesia used between 6 hours prior to anesthesia start to PACU discharge    Mental Status:  Alert   PONV Controlled:  Controlled   Airway Patency:  Patent  Two or more mitigation strategies used for obstructive sleep apnea   Post Op Vitals Reviewed: Yes    No anethesia notable event occurred.    Staff: Anesthesiologist           Last Filed PACU Vitals:  Vitals Value Taken Time   Temp 98.2 °F (36.8 °C) 07/22/25 17:40   Pulse 108 07/22/25 18:11   /75 07/22/25 18:26   Resp 41 07/22/25 18:11   SpO2 98 % 07/22/25 18:15   Vitals shown include unfiled device data.    Modified Wilfredo:     Vitals Value Taken Time   Activity 2 07/22/25 18:15   Respiration 2 07/22/25 18:15   Circulation 2 07/22/25 18:15   Consciousness 2 07/22/25 18:15   Oxygen Saturation 1 07/22/25 18:15     Modified Wilfredo Score: 9

## 2025-07-25 LAB
ANION GAP SERPL CALCULATED.3IONS-SCNC: 6 MMOL/L (ref 4–13)
BUN SERPL-MCNC: 10 MG/DL (ref 5–25)
CALCIUM SERPL-MCNC: 8.6 MG/DL (ref 8.4–10.2)
CHLORIDE SERPL-SCNC: 102 MMOL/L (ref 96–108)
CO2 SERPL-SCNC: 30 MMOL/L (ref 21–32)
CREAT SERPL-MCNC: 0.44 MG/DL (ref 0.6–1.3)
ERYTHROCYTE [DISTWIDTH] IN BLOOD BY AUTOMATED COUNT: 15.9 % (ref 11.6–15.1)
GFR SERPL CREATININE-BSD FRML MDRD: 128 ML/MIN/1.73SQ M
GLUCOSE SERPL-MCNC: 130 MG/DL (ref 65–140)
GLUCOSE SERPL-MCNC: 144 MG/DL (ref 65–140)
GLUCOSE SERPL-MCNC: 145 MG/DL (ref 65–140)
GLUCOSE SERPL-MCNC: 148 MG/DL (ref 65–140)
GLUCOSE SERPL-MCNC: 153 MG/DL (ref 65–140)
GLUCOSE SERPL-MCNC: 153 MG/DL (ref 65–140)
GLUCOSE SERPL-MCNC: 172 MG/DL (ref 65–140)
GLUCOSE SERPL-MCNC: 175 MG/DL (ref 65–140)
GLUCOSE SERPL-MCNC: 180 MG/DL (ref 65–140)
GLUCOSE SERPL-MCNC: 202 MG/DL (ref 65–140)
GLUCOSE SERPL-MCNC: 214 MG/DL (ref 65–140)
GLUCOSE SERPL-MCNC: 227 MG/DL (ref 65–140)
GLUCOSE SERPL-MCNC: 232 MG/DL (ref 65–140)
HCT VFR BLD AUTO: 29.2 % (ref 34.8–46.1)
HGB BLD-MCNC: 9.9 G/DL (ref 11.5–15.4)
MCH RBC QN AUTO: 26 PG (ref 26.8–34.3)
MCHC RBC AUTO-ENTMCNC: 33.9 G/DL (ref 31.4–37.4)
MCV RBC AUTO: 77 FL (ref 82–98)
PLATELET # BLD AUTO: 341 THOUSANDS/UL (ref 149–390)
PMV BLD AUTO: 10.6 FL (ref 8.9–12.7)
POTASSIUM SERPL-SCNC: 3.7 MMOL/L (ref 3.5–5.3)
RBC # BLD AUTO: 3.81 MILLION/UL (ref 3.81–5.12)
SODIUM SERPL-SCNC: 138 MMOL/L (ref 135–147)
WBC # BLD AUTO: 11.07 THOUSAND/UL (ref 4.31–10.16)

## 2025-07-25 PROCEDURE — 80048 BASIC METABOLIC PNL TOTAL CA: CPT | Performed by: PHYSICIAN ASSISTANT

## 2025-07-25 PROCEDURE — 99233 SBSQ HOSP IP/OBS HIGH 50: CPT | Performed by: NURSE PRACTITIONER

## 2025-07-25 PROCEDURE — 99232 SBSQ HOSP IP/OBS MODERATE 35: CPT | Performed by: INTERNAL MEDICINE

## 2025-07-25 PROCEDURE — 82948 REAGENT STRIP/BLOOD GLUCOSE: CPT

## 2025-07-25 PROCEDURE — NC001 PR NO CHARGE: Performed by: STUDENT IN AN ORGANIZED HEALTH CARE EDUCATION/TRAINING PROGRAM

## 2025-07-25 PROCEDURE — 85027 COMPLETE CBC AUTOMATED: CPT | Performed by: PHYSICIAN ASSISTANT

## 2025-07-25 PROCEDURE — 99232 SBSQ HOSP IP/OBS MODERATE 35: CPT | Performed by: FAMILY MEDICINE

## 2025-07-25 PROCEDURE — 94762 N-INVAS EAR/PLS OXIMTRY CONT: CPT

## 2025-07-25 RX ORDER — INSULIN LISPRO 100 [IU]/ML
15 INJECTION, SOLUTION INTRAVENOUS; SUBCUTANEOUS
Status: DISCONTINUED | OUTPATIENT
Start: 2025-07-26 | End: 2025-07-27

## 2025-07-25 RX ORDER — OXYCODONE HYDROCHLORIDE 10 MG/1
10 TABLET ORAL EVERY 4 HOURS PRN
Refills: 0 | Status: DISCONTINUED | OUTPATIENT
Start: 2025-07-25 | End: 2025-07-27

## 2025-07-25 RX ORDER — POTASSIUM CHLORIDE 1500 MG/1
40 TABLET, EXTENDED RELEASE ORAL ONCE
Status: COMPLETED | OUTPATIENT
Start: 2025-07-25 | End: 2025-07-25

## 2025-07-25 RX ORDER — GLYCOPYRROLATE 0.2 MG/ML
0.2 INJECTION INTRAMUSCULAR; INTRAVENOUS EVERY 4 HOURS PRN
Status: DISCONTINUED | OUTPATIENT
Start: 2025-07-25 | End: 2025-07-26

## 2025-07-25 RX ORDER — INSULIN LISPRO 100 [IU]/ML
2-12 INJECTION, SOLUTION INTRAVENOUS; SUBCUTANEOUS
Status: DISCONTINUED | OUTPATIENT
Start: 2025-07-26 | End: 2025-07-27 | Stop reason: HOSPADM

## 2025-07-25 RX ORDER — LORAZEPAM 2 MG/ML
0.5 INJECTION INTRAMUSCULAR EVERY 4 HOURS PRN
Status: DISCONTINUED | OUTPATIENT
Start: 2025-07-25 | End: 2025-07-26

## 2025-07-25 RX ORDER — INSULIN LISPRO 100 [IU]/ML
2-12 INJECTION, SOLUTION INTRAVENOUS; SUBCUTANEOUS
Status: DISCONTINUED | OUTPATIENT
Start: 2025-07-25 | End: 2025-07-25

## 2025-07-25 RX ORDER — INSULIN GLARGINE 100 [IU]/ML
45 INJECTION, SOLUTION SUBCUTANEOUS
Status: DISCONTINUED | OUTPATIENT
Start: 2025-07-25 | End: 2025-07-26

## 2025-07-25 RX ORDER — INSULIN LISPRO 100 [IU]/ML
15 INJECTION, SOLUTION INTRAVENOUS; SUBCUTANEOUS
Status: DISCONTINUED | OUTPATIENT
Start: 2025-07-25 | End: 2025-07-25

## 2025-07-25 RX ORDER — HALOPERIDOL 5 MG/ML
2 INJECTION INTRAMUSCULAR
Status: DISCONTINUED | OUTPATIENT
Start: 2025-07-25 | End: 2025-07-26

## 2025-07-25 RX ADMIN — ATORVASTATIN CALCIUM 80 MG: 80 TABLET, FILM COATED ORAL at 16:55

## 2025-07-25 RX ADMIN — SODIUM CHLORIDE 6 UNITS/HR: 9 INJECTION, SOLUTION INTRAVENOUS at 10:38

## 2025-07-25 RX ADMIN — METOPROLOL TARTRATE 25 MG: 25 TABLET, FILM COATED ORAL at 08:33

## 2025-07-25 RX ADMIN — HYDROMORPHONE HYDROCHLORIDE 0.5 MG: 1 INJECTION, SOLUTION INTRAMUSCULAR; INTRAVENOUS; SUBCUTANEOUS at 16:46

## 2025-07-25 RX ADMIN — HYDROMORPHONE HYDROCHLORIDE 4 MG: 2 TABLET ORAL at 06:04

## 2025-07-25 RX ADMIN — GABAPENTIN 600 MG: 300 CAPSULE ORAL at 13:11

## 2025-07-25 RX ADMIN — CYCLOBENZAPRINE HYDROCHLORIDE 10 MG: 10 TABLET, FILM COATED ORAL at 08:33

## 2025-07-25 RX ADMIN — ACETAMINOPHEN 975 MG: 325 TABLET ORAL at 13:11

## 2025-07-25 RX ADMIN — ACETAMINOPHEN 975 MG: 325 TABLET ORAL at 21:57

## 2025-07-25 RX ADMIN — GABAPENTIN 600 MG: 300 CAPSULE ORAL at 08:32

## 2025-07-25 RX ADMIN — LISINOPRIL 20 MG: 20 TABLET ORAL at 08:33

## 2025-07-25 RX ADMIN — OXYCODONE HYDROCHLORIDE 15 MG: 5 TABLET ORAL at 20:04

## 2025-07-25 RX ADMIN — INSULIN GLARGINE 45 UNITS: 100 INJECTION, SOLUTION SUBCUTANEOUS at 21:56

## 2025-07-25 RX ADMIN — METOPROLOL TARTRATE 25 MG: 25 TABLET, FILM COATED ORAL at 21:57

## 2025-07-25 RX ADMIN — RIVAROXABAN 2.5 MG: 2.5 TABLET, FILM COATED ORAL at 16:55

## 2025-07-25 RX ADMIN — CYCLOBENZAPRINE HYDROCHLORIDE 10 MG: 10 TABLET, FILM COATED ORAL at 16:55

## 2025-07-25 RX ADMIN — AMLODIPINE BESYLATE 5 MG: 5 TABLET ORAL at 08:32

## 2025-07-25 RX ADMIN — PRAZOSIN HYDROCHLORIDE 1 MG: 1 CAPSULE ORAL at 21:58

## 2025-07-25 RX ADMIN — GABAPENTIN 600 MG: 300 CAPSULE ORAL at 16:55

## 2025-07-25 RX ADMIN — ASPIRIN 81 MG CHEWABLE TABLET 81 MG: 81 TABLET CHEWABLE at 08:32

## 2025-07-25 RX ADMIN — RIVAROXABAN 2.5 MG: 2.5 TABLET, FILM COATED ORAL at 08:35

## 2025-07-25 RX ADMIN — ARIPIPRAZOLE 5 MG: 5 TABLET ORAL at 08:35

## 2025-07-25 RX ADMIN — HYDROMORPHONE HYDROCHLORIDE 4 MG: 2 TABLET ORAL at 10:41

## 2025-07-25 RX ADMIN — ACETAMINOPHEN 975 MG: 325 TABLET ORAL at 06:04

## 2025-07-25 RX ADMIN — DULOXETINE 60 MG: 60 CAPSULE, DELAYED RELEASE ORAL at 08:33

## 2025-07-25 RX ADMIN — POTASSIUM CHLORIDE 40 MEQ: 1500 TABLET, EXTENDED RELEASE ORAL at 08:32

## 2025-07-25 RX ADMIN — OXYCODONE HYDROCHLORIDE 15 MG: 5 TABLET ORAL at 13:59

## 2025-07-25 RX ADMIN — HYDROMORPHONE HYDROCHLORIDE 0.5 MG: 1 INJECTION, SOLUTION INTRAMUSCULAR; INTRAVENOUS; SUBCUTANEOUS at 21:56

## 2025-07-25 RX ADMIN — KETAMINE HYDROCHLORIDE 0.1 MG/KG/HR: 100 INJECTION INTRAMUSCULAR; INTRAVENOUS at 13:59

## 2025-07-25 RX ADMIN — NICOTINE 1 PATCH: 21 PATCH, EXTENDED RELEASE TRANSDERMAL at 08:34

## 2025-07-25 RX ADMIN — HYDROMORPHONE HYDROCHLORIDE 0.5 MG: 1 INJECTION, SOLUTION INTRAMUSCULAR; INTRAVENOUS; SUBCUTANEOUS at 07:44

## 2025-07-25 RX ADMIN — GABAPENTIN 600 MG: 300 CAPSULE ORAL at 21:57

## 2025-07-25 RX ADMIN — FLUTICASONE FUROATE AND VILANTEROL TRIFENATATE 1 PUFF: 100; 25 POWDER RESPIRATORY (INHALATION) at 08:36

## 2025-07-25 RX ADMIN — CYCLOBENZAPRINE HYDROCHLORIDE 10 MG: 10 TABLET, FILM COATED ORAL at 21:57

## 2025-07-25 RX ADMIN — SENNOSIDES, DOCUSATE SODIUM 1 TABLET: 50; 8.6 TABLET, FILM COATED ORAL at 08:33

## 2025-07-26 LAB
ANION GAP SERPL CALCULATED.3IONS-SCNC: 7 MMOL/L (ref 4–13)
BUN SERPL-MCNC: 15 MG/DL (ref 5–25)
CALCIUM SERPL-MCNC: 8.6 MG/DL (ref 8.4–10.2)
CHLORIDE SERPL-SCNC: 101 MMOL/L (ref 96–108)
CO2 SERPL-SCNC: 28 MMOL/L (ref 21–32)
CREAT SERPL-MCNC: 0.48 MG/DL (ref 0.6–1.3)
ERYTHROCYTE [DISTWIDTH] IN BLOOD BY AUTOMATED COUNT: 15.6 % (ref 11.6–15.1)
GFR SERPL CREATININE-BSD FRML MDRD: 124 ML/MIN/1.73SQ M
GLUCOSE SERPL-MCNC: 170 MG/DL (ref 65–140)
GLUCOSE SERPL-MCNC: 203 MG/DL (ref 65–140)
GLUCOSE SERPL-MCNC: 253 MG/DL (ref 65–140)
GLUCOSE SERPL-MCNC: 274 MG/DL (ref 65–140)
GLUCOSE SERPL-MCNC: 293 MG/DL (ref 65–140)
HCT VFR BLD AUTO: 27.2 % (ref 34.8–46.1)
HGB BLD-MCNC: 9.2 G/DL (ref 11.5–15.4)
MCH RBC QN AUTO: 25.7 PG (ref 26.8–34.3)
MCHC RBC AUTO-ENTMCNC: 33.8 G/DL (ref 31.4–37.4)
MCV RBC AUTO: 76 FL (ref 82–98)
PLATELET # BLD AUTO: 337 THOUSANDS/UL (ref 149–390)
PMV BLD AUTO: 9.8 FL (ref 8.9–12.7)
POTASSIUM SERPL-SCNC: 4.4 MMOL/L (ref 3.5–5.3)
RBC # BLD AUTO: 3.58 MILLION/UL (ref 3.81–5.12)
SODIUM SERPL-SCNC: 136 MMOL/L (ref 135–147)
WBC # BLD AUTO: 10.38 THOUSAND/UL (ref 4.31–10.16)

## 2025-07-26 PROCEDURE — 99232 SBSQ HOSP IP/OBS MODERATE 35: CPT | Performed by: INTERNAL MEDICINE

## 2025-07-26 PROCEDURE — 80048 BASIC METABOLIC PNL TOTAL CA: CPT | Performed by: FAMILY MEDICINE

## 2025-07-26 PROCEDURE — 99232 SBSQ HOSP IP/OBS MODERATE 35: CPT | Performed by: FAMILY MEDICINE

## 2025-07-26 PROCEDURE — 82948 REAGENT STRIP/BLOOD GLUCOSE: CPT

## 2025-07-26 PROCEDURE — 99232 SBSQ HOSP IP/OBS MODERATE 35: CPT | Performed by: ANESTHESIOLOGY

## 2025-07-26 PROCEDURE — 85027 COMPLETE CBC AUTOMATED: CPT | Performed by: FAMILY MEDICINE

## 2025-07-26 RX ORDER — INSULIN GLARGINE 100 [IU]/ML
55 INJECTION, SOLUTION SUBCUTANEOUS
Status: DISCONTINUED | OUTPATIENT
Start: 2025-07-26 | End: 2025-07-27

## 2025-07-26 RX ORDER — FERROUS SULFATE 325(65) MG
325 TABLET ORAL
Status: DISCONTINUED | OUTPATIENT
Start: 2025-07-27 | End: 2025-07-27 | Stop reason: HOSPADM

## 2025-07-26 RX ORDER — ONDANSETRON 4 MG/1
4 TABLET, ORALLY DISINTEGRATING ORAL EVERY 6 HOURS PRN
Status: DISCONTINUED | OUTPATIENT
Start: 2025-07-26 | End: 2025-07-27 | Stop reason: HOSPADM

## 2025-07-26 RX ADMIN — PRAZOSIN HYDROCHLORIDE 1 MG: 1 CAPSULE ORAL at 22:12

## 2025-07-26 RX ADMIN — INSULIN LISPRO 15 UNITS: 100 INJECTION, SOLUTION INTRAVENOUS; SUBCUTANEOUS at 07:02

## 2025-07-26 RX ADMIN — ACETAMINOPHEN 975 MG: 325 TABLET ORAL at 04:43

## 2025-07-26 RX ADMIN — ACETAMINOPHEN 975 MG: 325 TABLET ORAL at 22:09

## 2025-07-26 RX ADMIN — OXYCODONE HYDROCHLORIDE 15 MG: 5 TABLET ORAL at 12:23

## 2025-07-26 RX ADMIN — CYCLOBENZAPRINE HYDROCHLORIDE 10 MG: 10 TABLET, FILM COATED ORAL at 08:17

## 2025-07-26 RX ADMIN — CYCLOBENZAPRINE HYDROCHLORIDE 10 MG: 10 TABLET, FILM COATED ORAL at 17:18

## 2025-07-26 RX ADMIN — NICOTINE 1 PATCH: 21 PATCH, EXTENDED RELEASE TRANSDERMAL at 08:19

## 2025-07-26 RX ADMIN — OXYCODONE HYDROCHLORIDE 15 MG: 5 TABLET ORAL at 03:49

## 2025-07-26 RX ADMIN — DULOXETINE 60 MG: 60 CAPSULE, DELAYED RELEASE ORAL at 08:17

## 2025-07-26 RX ADMIN — INSULIN GLARGINE 55 UNITS: 100 INJECTION, SOLUTION SUBCUTANEOUS at 22:09

## 2025-07-26 RX ADMIN — INSULIN LISPRO 6 UNITS: 100 INJECTION, SOLUTION INTRAVENOUS; SUBCUTANEOUS at 22:11

## 2025-07-26 RX ADMIN — GABAPENTIN 600 MG: 300 CAPSULE ORAL at 08:17

## 2025-07-26 RX ADMIN — RIVAROXABAN 2.5 MG: 2.5 TABLET, FILM COATED ORAL at 07:02

## 2025-07-26 RX ADMIN — METOPROLOL TARTRATE 25 MG: 25 TABLET, FILM COATED ORAL at 22:10

## 2025-07-26 RX ADMIN — OXYCODONE HYDROCHLORIDE 15 MG: 5 TABLET ORAL at 16:26

## 2025-07-26 RX ADMIN — RIVAROXABAN 2.5 MG: 2.5 TABLET, FILM COATED ORAL at 17:18

## 2025-07-26 RX ADMIN — ACETAMINOPHEN 975 MG: 325 TABLET ORAL at 13:44

## 2025-07-26 RX ADMIN — INSULIN LISPRO 4 UNITS: 100 INJECTION, SOLUTION INTRAVENOUS; SUBCUTANEOUS at 11:47

## 2025-07-26 RX ADMIN — GABAPENTIN 600 MG: 300 CAPSULE ORAL at 22:09

## 2025-07-26 RX ADMIN — OXYCODONE HYDROCHLORIDE 15 MG: 5 TABLET ORAL at 20:38

## 2025-07-26 RX ADMIN — HYDROMORPHONE HYDROCHLORIDE 0.5 MG: 1 INJECTION, SOLUTION INTRAMUSCULAR; INTRAVENOUS; SUBCUTANEOUS at 04:43

## 2025-07-26 RX ADMIN — HYDROMORPHONE HYDROCHLORIDE 0.5 MG: 1 INJECTION, SOLUTION INTRAMUSCULAR; INTRAVENOUS; SUBCUTANEOUS at 09:37

## 2025-07-26 RX ADMIN — CYCLOBENZAPRINE HYDROCHLORIDE 10 MG: 10 TABLET, FILM COATED ORAL at 22:10

## 2025-07-26 RX ADMIN — INSULIN LISPRO 15 UNITS: 100 INJECTION, SOLUTION INTRAVENOUS; SUBCUTANEOUS at 17:20

## 2025-07-26 RX ADMIN — INSULIN LISPRO 2 UNITS: 100 INJECTION, SOLUTION INTRAVENOUS; SUBCUTANEOUS at 17:21

## 2025-07-26 RX ADMIN — INSULIN LISPRO 6 UNITS: 100 INJECTION, SOLUTION INTRAVENOUS; SUBCUTANEOUS at 07:02

## 2025-07-26 RX ADMIN — ASPIRIN 81 MG CHEWABLE TABLET 81 MG: 81 TABLET CHEWABLE at 08:17

## 2025-07-26 RX ADMIN — IRON SUCROSE 200 MG: 20 INJECTION, SOLUTION INTRAVENOUS at 12:28

## 2025-07-26 RX ADMIN — INSULIN LISPRO 15 UNITS: 100 INJECTION, SOLUTION INTRAVENOUS; SUBCUTANEOUS at 11:51

## 2025-07-26 RX ADMIN — OXYCODONE HYDROCHLORIDE 15 MG: 5 TABLET ORAL at 08:19

## 2025-07-26 RX ADMIN — ATORVASTATIN CALCIUM 80 MG: 80 TABLET, FILM COATED ORAL at 17:18

## 2025-07-26 RX ADMIN — GABAPENTIN 600 MG: 300 CAPSULE ORAL at 11:51

## 2025-07-26 RX ADMIN — GABAPENTIN 600 MG: 300 CAPSULE ORAL at 17:18

## 2025-07-26 RX ADMIN — ARIPIPRAZOLE 5 MG: 5 TABLET ORAL at 08:21

## 2025-07-27 VITALS
OXYGEN SATURATION: 96 % | WEIGHT: 231.48 LBS | HEIGHT: 61 IN | HEART RATE: 92 BPM | TEMPERATURE: 98.5 F | BODY MASS INDEX: 43.7 KG/M2 | RESPIRATION RATE: 16 BRPM | SYSTOLIC BLOOD PRESSURE: 127 MMHG | DIASTOLIC BLOOD PRESSURE: 92 MMHG

## 2025-07-27 LAB
ANION GAP SERPL CALCULATED.3IONS-SCNC: 9 MMOL/L (ref 4–13)
ANISOCYTOSIS BLD QL SMEAR: PRESENT
BASOPHILS # BLD MANUAL: 0 THOUSAND/UL (ref 0–0.1)
BASOPHILS NFR MAR MANUAL: 0 % (ref 0–1)
BUN SERPL-MCNC: 16 MG/DL (ref 5–25)
CALCIUM SERPL-MCNC: 8.8 MG/DL (ref 8.4–10.2)
CHLORIDE SERPL-SCNC: 98 MMOL/L (ref 96–108)
CO2 SERPL-SCNC: 29 MMOL/L (ref 21–32)
CREAT SERPL-MCNC: 0.43 MG/DL (ref 0.6–1.3)
EOSINOPHIL # BLD MANUAL: 0.18 THOUSAND/UL (ref 0–0.4)
EOSINOPHIL NFR BLD MANUAL: 2 % (ref 0–6)
ERYTHROCYTE [DISTWIDTH] IN BLOOD BY AUTOMATED COUNT: 15.8 % (ref 11.6–15.1)
GFR SERPL CREATININE-BSD FRML MDRD: 129 ML/MIN/1.73SQ M
GLUCOSE SERPL-MCNC: 113 MG/DL (ref 65–140)
GLUCOSE SERPL-MCNC: 148 MG/DL (ref 65–140)
GLUCOSE SERPL-MCNC: 171 MG/DL (ref 65–140)
GLUCOSE SERPL-MCNC: 197 MG/DL (ref 65–140)
GLUCOSE SERPL-MCNC: 267 MG/DL (ref 65–140)
GLUCOSE SERPL-MCNC: 306 MG/DL (ref 65–140)
GLUCOSE SERPL-MCNC: 349 MG/DL (ref 65–140)
HCT VFR BLD AUTO: 26 % (ref 34.8–46.1)
HGB BLD-MCNC: 9 G/DL (ref 11.5–15.4)
LYMPHOCYTES # BLD AUTO: 3.55 THOUSAND/UL (ref 0.6–4.47)
LYMPHOCYTES # BLD AUTO: 37 % (ref 14–44)
MCH RBC QN AUTO: 26.1 PG (ref 26.8–34.3)
MCHC RBC AUTO-ENTMCNC: 34.6 G/DL (ref 31.4–37.4)
MCV RBC AUTO: 75 FL (ref 82–98)
MONOCYTES # BLD AUTO: 0.46 THOUSAND/UL (ref 0–1.22)
MONOCYTES NFR BLD: 5 % (ref 4–12)
MYELOCYTE ABSOLUTE CT: 0.09 THOUSAND/UL (ref 0–0.1)
MYELOCYTES NFR BLD MANUAL: 1 % (ref 0–1)
NEUTROPHILS # BLD MANUAL: 4.83 THOUSAND/UL (ref 1.85–7.62)
NEUTS BAND NFR BLD MANUAL: 3 % (ref 0–8)
NEUTS SEG NFR BLD AUTO: 50 % (ref 43–75)
PLATELET # BLD AUTO: 401 THOUSANDS/UL (ref 149–390)
PLATELET BLD QL SMEAR: ADEQUATE
PMV BLD AUTO: 9.8 FL (ref 8.9–12.7)
POLYCHROMASIA BLD QL SMEAR: PRESENT
POTASSIUM SERPL-SCNC: 4.2 MMOL/L (ref 3.5–5.3)
RBC # BLD AUTO: 3.45 MILLION/UL (ref 3.81–5.12)
RBC MORPH BLD: PRESENT
SODIUM SERPL-SCNC: 136 MMOL/L (ref 135–147)
VARIANT LYMPHS # BLD AUTO: 2 %
WBC # BLD AUTO: 9.11 THOUSAND/UL (ref 4.31–10.16)

## 2025-07-27 PROCEDURE — 99238 HOSP IP/OBS DSCHRG MGMT 30/<: CPT | Performed by: FAMILY MEDICINE

## 2025-07-27 PROCEDURE — 85027 COMPLETE CBC AUTOMATED: CPT

## 2025-07-27 PROCEDURE — NC001 PR NO CHARGE: Performed by: FAMILY MEDICINE

## 2025-07-27 PROCEDURE — 82948 REAGENT STRIP/BLOOD GLUCOSE: CPT

## 2025-07-27 PROCEDURE — 85007 BL SMEAR W/DIFF WBC COUNT: CPT

## 2025-07-27 PROCEDURE — 80048 BASIC METABOLIC PNL TOTAL CA: CPT

## 2025-07-27 RX ORDER — OXYCODONE HYDROCHLORIDE 5 MG/1
5 TABLET ORAL EVERY 4 HOURS PRN
Qty: 15 TABLET | Refills: 0 | Status: ON HOLD | OUTPATIENT
Start: 2025-07-27

## 2025-07-27 RX ORDER — OXYCODONE HYDROCHLORIDE 10 MG/1
10 TABLET ORAL EVERY 6 HOURS PRN
Qty: 15 TABLET | Refills: 0 | Status: ON HOLD | OUTPATIENT
Start: 2025-07-27

## 2025-07-27 RX ORDER — LIDOCAINE 40 MG/G
CREAM TOPICAL 4 TIMES DAILY PRN
Qty: 28 G | Refills: 0 | Status: ON HOLD | OUTPATIENT
Start: 2025-07-27

## 2025-07-27 RX ORDER — METOPROLOL TARTRATE 25 MG/1
25 TABLET, FILM COATED ORAL EVERY 12 HOURS SCHEDULED
Qty: 60 TABLET | Refills: 0 | Status: ON HOLD | OUTPATIENT
Start: 2025-07-27

## 2025-07-27 RX ORDER — AMLODIPINE BESYLATE 5 MG/1
5 TABLET ORAL DAILY
Qty: 30 TABLET | Refills: 0 | Status: ON HOLD | OUTPATIENT
Start: 2025-07-28

## 2025-07-27 RX ORDER — ARIPIPRAZOLE 5 MG/1
5 TABLET ORAL DAILY
Qty: 30 TABLET | Refills: 0 | Status: ON HOLD | OUTPATIENT
Start: 2025-07-28

## 2025-07-27 RX ORDER — INSULIN GLARGINE 100 [IU]/ML
66 INJECTION, SOLUTION SUBCUTANEOUS
Status: DISCONTINUED | OUTPATIENT
Start: 2025-07-27 | End: 2025-07-27

## 2025-07-27 RX ORDER — ONDANSETRON 4 MG/1
4 TABLET, ORALLY DISINTEGRATING ORAL EVERY 6 HOURS PRN
Qty: 12 TABLET | Refills: 0 | Status: ON HOLD | OUTPATIENT
Start: 2025-07-27

## 2025-07-27 RX ORDER — GABAPENTIN 300 MG/1
600 CAPSULE ORAL 3 TIMES DAILY
Status: DISCONTINUED | OUTPATIENT
Start: 2025-07-27 | End: 2025-07-27 | Stop reason: HOSPADM

## 2025-07-27 RX ORDER — INSULIN GLARGINE 100 [IU]/ML
60 INJECTION, SOLUTION SUBCUTANEOUS DAILY
Qty: 30 ML | Refills: 0 | Status: ON HOLD | OUTPATIENT
Start: 2025-07-27 | End: 2025-09-15

## 2025-07-27 RX ORDER — POLYETHYLENE GLYCOL 3350 17 G/17G
17 POWDER, FOR SOLUTION ORAL DAILY
Qty: 30 EACH | Refills: 0 | Status: ON HOLD | OUTPATIENT
Start: 2025-07-28

## 2025-07-27 RX ORDER — MUSCLE RUB CREAM 100; 150 MG/G; MG/G
CREAM TOPICAL 4 TIMES DAILY PRN
Qty: 35 G | Refills: 0 | Status: ON HOLD | OUTPATIENT
Start: 2025-07-27

## 2025-07-27 RX ORDER — ACETAMINOPHEN 325 MG/1
975 TABLET ORAL EVERY 8 HOURS SCHEDULED
Qty: 270 TABLET | Refills: 0 | Status: ON HOLD | OUTPATIENT
Start: 2025-07-27

## 2025-07-27 RX ORDER — RIVAROXABAN 2.5 MG/1
2.5 TABLET, FILM COATED ORAL 2 TIMES DAILY WITH MEALS
Qty: 60 TABLET | Refills: 0 | Status: SHIPPED | OUTPATIENT
Start: 2025-07-27 | End: 2025-07-30 | Stop reason: SDUPTHER

## 2025-07-27 RX ORDER — FERROUS SULFATE 325(65) MG
325 TABLET ORAL
Qty: 30 TABLET | Refills: 0 | Status: ON HOLD | OUTPATIENT
Start: 2025-07-28

## 2025-07-27 RX ORDER — INSULIN LISPRO 100 [IU]/ML
22 INJECTION, SOLUTION INTRAVENOUS; SUBCUTANEOUS
Status: DISCONTINUED | OUTPATIENT
Start: 2025-07-27 | End: 2025-07-27 | Stop reason: HOSPADM

## 2025-07-27 RX ORDER — GABAPENTIN 300 MG/1
600 CAPSULE ORAL 3 TIMES DAILY
Qty: 180 CAPSULE | Refills: 0 | Status: ON HOLD | OUTPATIENT
Start: 2025-07-27

## 2025-07-27 RX ORDER — OXYCODONE HYDROCHLORIDE 10 MG/1
10 TABLET ORAL EVERY 4 HOURS PRN
Refills: 0 | Status: DISCONTINUED | OUTPATIENT
Start: 2025-07-27 | End: 2025-07-27 | Stop reason: HOSPADM

## 2025-07-27 RX ORDER — OXYCODONE HYDROCHLORIDE 5 MG/1
5 TABLET ORAL EVERY 4 HOURS PRN
Refills: 0 | Status: DISCONTINUED | OUTPATIENT
Start: 2025-07-27 | End: 2025-07-27 | Stop reason: HOSPADM

## 2025-07-27 RX ORDER — INSULIN GLARGINE 100 [IU]/ML
60 INJECTION, SOLUTION SUBCUTANEOUS
Status: DISCONTINUED | OUTPATIENT
Start: 2025-07-27 | End: 2025-07-27 | Stop reason: HOSPADM

## 2025-07-27 RX ORDER — AMOXICILLIN 250 MG
1 CAPSULE ORAL 2 TIMES DAILY
Qty: 60 TABLET | Refills: 0 | Status: ON HOLD | OUTPATIENT
Start: 2025-07-27

## 2025-07-27 RX ORDER — INSULIN LISPRO 100 [IU]/ML
20 INJECTION, SOLUTION INTRAVENOUS; SUBCUTANEOUS
Qty: 18 ML | Refills: 0 | Status: ON HOLD | OUTPATIENT
Start: 2025-07-27

## 2025-07-27 RX ADMIN — AMLODIPINE BESYLATE 5 MG: 5 TABLET ORAL at 09:37

## 2025-07-27 RX ADMIN — OXYCODONE HYDROCHLORIDE 15 MG: 5 TABLET ORAL at 04:36

## 2025-07-27 RX ADMIN — ACETAMINOPHEN 975 MG: 325 TABLET ORAL at 04:36

## 2025-07-27 RX ADMIN — GABAPENTIN 600 MG: 300 CAPSULE ORAL at 17:31

## 2025-07-27 RX ADMIN — INSULIN LISPRO 15 UNITS: 100 INJECTION, SOLUTION INTRAVENOUS; SUBCUTANEOUS at 07:26

## 2025-07-27 RX ADMIN — INSULIN LISPRO 8 UNITS: 100 INJECTION, SOLUTION INTRAVENOUS; SUBCUTANEOUS at 07:27

## 2025-07-27 RX ADMIN — RIVAROXABAN 2.5 MG: 2.5 TABLET, FILM COATED ORAL at 17:32

## 2025-07-27 RX ADMIN — DULOXETINE 60 MG: 60 CAPSULE, DELAYED RELEASE ORAL at 09:35

## 2025-07-27 RX ADMIN — RIVAROXABAN 2.5 MG: 2.5 TABLET, FILM COATED ORAL at 07:26

## 2025-07-27 RX ADMIN — FERROUS SULFATE TAB 325 MG (65 MG ELEMENTAL FE) 325 MG: 325 (65 FE) TAB at 07:26

## 2025-07-27 RX ADMIN — METOPROLOL TARTRATE 25 MG: 25 TABLET, FILM COATED ORAL at 09:36

## 2025-07-27 RX ADMIN — GABAPENTIN 600 MG: 300 CAPSULE ORAL at 09:36

## 2025-07-27 RX ADMIN — LISINOPRIL 20 MG: 20 TABLET ORAL at 09:37

## 2025-07-27 RX ADMIN — INSULIN LISPRO 22 UNITS: 100 INJECTION, SOLUTION INTRAVENOUS; SUBCUTANEOUS at 11:36

## 2025-07-27 RX ADMIN — ATORVASTATIN CALCIUM 80 MG: 80 TABLET, FILM COATED ORAL at 17:31

## 2025-07-27 RX ADMIN — CYCLOBENZAPRINE HYDROCHLORIDE 10 MG: 10 TABLET, FILM COATED ORAL at 09:36

## 2025-07-27 RX ADMIN — ACETAMINOPHEN 975 MG: 325 TABLET ORAL at 14:27

## 2025-07-27 RX ADMIN — NICOTINE 1 PATCH: 21 PATCH, EXTENDED RELEASE TRANSDERMAL at 09:37

## 2025-07-27 RX ADMIN — CYCLOBENZAPRINE HYDROCHLORIDE 10 MG: 10 TABLET, FILM COATED ORAL at 17:31

## 2025-07-27 RX ADMIN — INSULIN LISPRO 22 UNITS: 100 INJECTION, SOLUTION INTRAVENOUS; SUBCUTANEOUS at 17:33

## 2025-07-27 RX ADMIN — ARIPIPRAZOLE 5 MG: 5 TABLET ORAL at 09:41

## 2025-07-27 RX ADMIN — ASPIRIN 81 MG CHEWABLE TABLET 81 MG: 81 TABLET CHEWABLE at 09:35

## 2025-07-28 ENCOUNTER — PATIENT OUTREACH (OUTPATIENT)
Dept: CASE MANAGEMENT | Facility: OTHER | Age: 40
End: 2025-07-28

## 2025-07-29 ENCOUNTER — TRANSITIONAL CARE MANAGEMENT (OUTPATIENT)
Dept: FAMILY MEDICINE CLINIC | Facility: CLINIC | Age: 40
End: 2025-07-29

## 2025-07-29 DIAGNOSIS — I73.9 PAD (PERIPHERAL ARTERY DISEASE) (HCC): ICD-10-CM

## 2025-07-29 DIAGNOSIS — F31.75 BIPOLAR DISORDER, IN PARTIAL REMISSION, MOST RECENT EPISODE DEPRESSED (HCC): ICD-10-CM

## 2025-07-29 DIAGNOSIS — Z89.612 S/P AKA (ABOVE KNEE AMPUTATION) UNILATERAL, LEFT (HCC): ICD-10-CM

## 2025-07-30 ENCOUNTER — TELEPHONE (OUTPATIENT)
Dept: VASCULAR SURGERY | Facility: CLINIC | Age: 40
End: 2025-07-30

## 2025-07-30 ENCOUNTER — PATIENT OUTREACH (OUTPATIENT)
Dept: CASE MANAGEMENT | Facility: OTHER | Age: 40
End: 2025-07-30

## 2025-07-30 RX ORDER — RIVAROXABAN 2.5 MG/1
2.5 TABLET, FILM COATED ORAL 2 TIMES DAILY WITH MEALS
Qty: 60 TABLET | Refills: 0 | Status: ON HOLD | OUTPATIENT
Start: 2025-07-30

## 2025-07-30 RX ORDER — ATORVASTATIN CALCIUM 80 MG/1
80 TABLET, FILM COATED ORAL
Qty: 30 TABLET | Refills: 0 | Status: ON HOLD | OUTPATIENT
Start: 2025-07-30 | End: 2025-08-29

## 2025-07-30 RX ORDER — DULOXETIN HYDROCHLORIDE 30 MG/1
60 CAPSULE, DELAYED RELEASE ORAL DAILY
Qty: 60 CAPSULE | Refills: 0 | Status: ON HOLD | OUTPATIENT
Start: 2025-07-30

## 2025-07-31 ENCOUNTER — PATIENT OUTREACH (OUTPATIENT)
Dept: CASE MANAGEMENT | Facility: OTHER | Age: 40
End: 2025-07-31

## 2025-08-01 ENCOUNTER — TELEPHONE (OUTPATIENT)
Age: 40
End: 2025-08-01

## 2025-08-01 ENCOUNTER — PATIENT OUTREACH (OUTPATIENT)
Dept: CASE MANAGEMENT | Facility: OTHER | Age: 40
End: 2025-08-01

## 2025-08-04 ENCOUNTER — TELEPHONE (OUTPATIENT)
Dept: VASCULAR SURGERY | Facility: CLINIC | Age: 40
End: 2025-08-04

## 2025-08-05 ENCOUNTER — PATIENT OUTREACH (OUTPATIENT)
Dept: CASE MANAGEMENT | Facility: OTHER | Age: 40
End: 2025-08-05

## 2025-08-05 DIAGNOSIS — I10 ESSENTIAL HYPERTENSION: ICD-10-CM

## 2025-08-05 DIAGNOSIS — F31.75 BIPOLAR DISORDER, IN PARTIAL REMISSION, MOST RECENT EPISODE DEPRESSED (HCC): ICD-10-CM

## 2025-08-05 DIAGNOSIS — I10 HYPERTENSION: ICD-10-CM

## 2025-08-05 DIAGNOSIS — M79.604 PAIN OF RIGHT LOWER EXTREMITY: ICD-10-CM

## 2025-08-05 RX ORDER — GABAPENTIN 300 MG/1
600 CAPSULE ORAL 3 TIMES DAILY
Qty: 180 CAPSULE | Refills: 0 | OUTPATIENT
Start: 2025-08-05

## 2025-08-05 RX ORDER — ARIPIPRAZOLE 5 MG/1
5 TABLET ORAL DAILY
Qty: 30 TABLET | Refills: 0 | OUTPATIENT
Start: 2025-08-05

## 2025-08-05 RX ORDER — METOPROLOL TARTRATE 25 MG/1
25 TABLET, FILM COATED ORAL 2 TIMES DAILY
Qty: 60 TABLET | Refills: 0 | OUTPATIENT
Start: 2025-08-05

## 2025-08-05 RX ORDER — AMLODIPINE BESYLATE 5 MG/1
5 TABLET ORAL DAILY
Qty: 30 TABLET | Refills: 0 | OUTPATIENT
Start: 2025-08-05

## 2025-08-06 ENCOUNTER — APPOINTMENT (INPATIENT)
Dept: MRI IMAGING | Facility: HOSPITAL | Age: 40
DRG: 065 | End: 2025-08-06
Payer: COMMERCIAL

## 2025-08-06 ENCOUNTER — HOSPITAL ENCOUNTER (INPATIENT)
Facility: HOSPITAL | Age: 40
LOS: 6 days | Discharge: NURSING, REHAB OR LONG-TERM CARE | DRG: 065 | End: 2025-08-12
Attending: EMERGENCY MEDICINE | Admitting: STUDENT IN AN ORGANIZED HEALTH CARE EDUCATION/TRAINING PROGRAM
Payer: COMMERCIAL

## 2025-08-06 ENCOUNTER — APPOINTMENT (INPATIENT)
Dept: NON INVASIVE DIAGNOSTICS | Facility: HOSPITAL | Age: 40
DRG: 065 | End: 2025-08-06
Payer: COMMERCIAL

## 2025-08-06 ENCOUNTER — APPOINTMENT (EMERGENCY)
Dept: CT IMAGING | Facility: HOSPITAL | Age: 40
DRG: 065 | End: 2025-08-06
Payer: COMMERCIAL

## 2025-08-06 DIAGNOSIS — Z87.898 HISTORY OF SUBSTANCE USE: ICD-10-CM

## 2025-08-06 DIAGNOSIS — I70.422: ICD-10-CM

## 2025-08-06 DIAGNOSIS — R53.1 ACUTE RIGHT-SIDED WEAKNESS: Primary | ICD-10-CM

## 2025-08-06 DIAGNOSIS — Z86.73 HISTORY OF CVA (CEREBROVASCULAR ACCIDENT): ICD-10-CM

## 2025-08-06 DIAGNOSIS — R29.90 STROKE-LIKE SYMPTOM: ICD-10-CM

## 2025-08-06 DIAGNOSIS — F31.75 BIPOLAR DISORDER, IN PARTIAL REMISSION, MOST RECENT EPISODE DEPRESSED (HCC): ICD-10-CM

## 2025-08-06 LAB
2HR DELTA HS TROPONIN: 0 NG/L
ALBUMIN SERPL BCG-MCNC: 3.7 G/DL (ref 3.5–5)
ALP SERPL-CCNC: 116 U/L (ref 34–104)
ALT SERPL W P-5'-P-CCNC: 22 U/L (ref 7–52)
AMPHETAMINES SERPL QL SCN: NEGATIVE
ANION GAP SERPL CALCULATED.3IONS-SCNC: 11 MMOL/L (ref 4–13)
APTT PPP: 24 SECONDS (ref 23–34)
AST SERPL W P-5'-P-CCNC: 15 U/L (ref 13–39)
ATRIAL RATE: 93 BPM
BARBITURATES UR QL: NEGATIVE
BENZODIAZ UR QL: NEGATIVE
BILIRUB SERPL-MCNC: 0.49 MG/DL (ref 0.2–1)
BUN SERPL-MCNC: 19 MG/DL (ref 5–25)
CALCIUM SERPL-MCNC: 8.7 MG/DL (ref 8.4–10.2)
CARDIAC TROPONIN I PNL SERPL HS: 4 NG/L (ref ?–50)
CARDIAC TROPONIN I PNL SERPL HS: 4 NG/L (ref ?–50)
CHLORIDE SERPL-SCNC: 100 MMOL/L (ref 96–108)
CO2 SERPL-SCNC: 26 MMOL/L (ref 21–32)
COCAINE UR QL: POSITIVE
CREAT SERPL-MCNC: 0.5 MG/DL (ref 0.6–1.3)
ERYTHROCYTE [DISTWIDTH] IN BLOOD BY AUTOMATED COUNT: 16 % (ref 11.6–15.1)
FENTANYL UR QL SCN: POSITIVE
GFR SERPL CREATININE-BSD FRML MDRD: 123 ML/MIN/1.73SQ M
GLUCOSE SERPL-MCNC: 115 MG/DL (ref 65–140)
GLUCOSE SERPL-MCNC: 177 MG/DL (ref 65–140)
GLUCOSE SERPL-MCNC: 181 MG/DL (ref 65–140)
GLUCOSE SERPL-MCNC: 205 MG/DL (ref 65–140)
GLUCOSE SERPL-MCNC: 242 MG/DL (ref 65–140)
GLUCOSE SERPL-MCNC: 250 MG/DL (ref 65–140)
GLUCOSE SERPL-MCNC: 56 MG/DL (ref 65–140)
HCT VFR BLD AUTO: 31.7 % (ref 34.8–46.1)
HGB BLD-MCNC: 10.8 G/DL (ref 11.5–15.4)
HYDROCODONE UR QL SCN: NEGATIVE
INR PPP: 1.07 (ref 0.85–1.19)
MCH RBC QN AUTO: 25.7 PG (ref 26.8–34.3)
MCHC RBC AUTO-ENTMCNC: 34.1 G/DL (ref 31.4–37.4)
MCV RBC AUTO: 76 FL (ref 82–98)
METHADONE UR QL: NEGATIVE
OPIATES UR QL SCN: POSITIVE
OXYCODONE+OXYMORPHONE UR QL SCN: POSITIVE
P AXIS: 62 DEGREES
PCP UR QL: NEGATIVE
PLATELET # BLD AUTO: 475 THOUSANDS/UL (ref 149–390)
PMV BLD AUTO: 9.7 FL (ref 8.9–12.7)
POTASSIUM SERPL-SCNC: 3.2 MMOL/L (ref 3.5–5.3)
PR INTERVAL: 128 MS
PROT SERPL-MCNC: 7.2 G/DL (ref 6.4–8.4)
PROTHROMBIN TIME: 14.4 SECONDS (ref 12.3–15)
QRS AXIS: 44 DEGREES
QRSD INTERVAL: 94 MS
QT INTERVAL: 358 MS
QTC INTERVAL: 445 MS
RBC # BLD AUTO: 4.2 MILLION/UL (ref 3.81–5.12)
SODIUM SERPL-SCNC: 137 MMOL/L (ref 135–147)
T WAVE AXIS: 10 DEGREES
THC UR QL: POSITIVE
TSH SERPL DL<=0.05 MIU/L-ACNC: 1.64 UIU/ML (ref 0.45–4.5)
VENTRICULAR RATE: 93 BPM
WBC # BLD AUTO: 10.96 THOUSAND/UL (ref 4.31–10.16)

## 2025-08-06 PROCEDURE — 70551 MRI BRAIN STEM W/O DYE: CPT

## 2025-08-06 PROCEDURE — 99448 NTRPROF PH1/NTRNET/EHR 21-30: CPT | Performed by: PSYCHIATRY & NEUROLOGY

## 2025-08-06 PROCEDURE — 96375 TX/PRO/DX INJ NEW DRUG ADDON: CPT

## 2025-08-06 PROCEDURE — 99223 1ST HOSP IP/OBS HIGH 75: CPT | Performed by: STUDENT IN AN ORGANIZED HEALTH CARE EDUCATION/TRAINING PROGRAM

## 2025-08-06 PROCEDURE — 99285 EMERGENCY DEPT VISIT HI MDM: CPT | Performed by: EMERGENCY MEDICINE

## 2025-08-06 PROCEDURE — 84484 ASSAY OF TROPONIN QUANT: CPT | Performed by: EMERGENCY MEDICINE

## 2025-08-06 PROCEDURE — 82948 REAGENT STRIP/BLOOD GLUCOSE: CPT

## 2025-08-06 PROCEDURE — 84443 ASSAY THYROID STIM HORMONE: CPT

## 2025-08-06 PROCEDURE — 92610 EVALUATE SWALLOWING FUNCTION: CPT

## 2025-08-06 PROCEDURE — 93005 ELECTROCARDIOGRAM TRACING: CPT

## 2025-08-06 PROCEDURE — 93010 ELECTROCARDIOGRAM REPORT: CPT | Performed by: INTERNAL MEDICINE

## 2025-08-06 PROCEDURE — 36415 COLL VENOUS BLD VENIPUNCTURE: CPT | Performed by: EMERGENCY MEDICINE

## 2025-08-06 PROCEDURE — 80307 DRUG TEST PRSMV CHEM ANLYZR: CPT | Performed by: STUDENT IN AN ORGANIZED HEALTH CARE EDUCATION/TRAINING PROGRAM

## 2025-08-06 PROCEDURE — 96365 THER/PROPH/DIAG IV INF INIT: CPT

## 2025-08-06 PROCEDURE — 85027 COMPLETE CBC AUTOMATED: CPT | Performed by: EMERGENCY MEDICINE

## 2025-08-06 PROCEDURE — 99284 EMERGENCY DEPT VISIT MOD MDM: CPT

## 2025-08-06 PROCEDURE — 80053 COMPREHEN METABOLIC PANEL: CPT | Performed by: EMERGENCY MEDICINE

## 2025-08-06 PROCEDURE — 85610 PROTHROMBIN TIME: CPT | Performed by: EMERGENCY MEDICINE

## 2025-08-06 PROCEDURE — 70496 CT ANGIOGRAPHY HEAD: CPT

## 2025-08-06 PROCEDURE — 70498 CT ANGIOGRAPHY NECK: CPT

## 2025-08-06 PROCEDURE — 85730 THROMBOPLASTIN TIME PARTIAL: CPT | Performed by: EMERGENCY MEDICINE

## 2025-08-06 RX ORDER — GABAPENTIN 300 MG/1
600 CAPSULE ORAL 3 TIMES DAILY
Status: DISCONTINUED | OUTPATIENT
Start: 2025-08-06 | End: 2025-08-12 | Stop reason: HOSPADM

## 2025-08-06 RX ORDER — DULOXETIN HYDROCHLORIDE 60 MG/1
60 CAPSULE, DELAYED RELEASE ORAL DAILY
Status: DISCONTINUED | OUTPATIENT
Start: 2025-08-06 | End: 2025-08-12 | Stop reason: HOSPADM

## 2025-08-06 RX ORDER — FLUTICASONE FUROATE AND VILANTEROL 100; 25 UG/1; UG/1
1 POWDER RESPIRATORY (INHALATION) DAILY
Status: DISCONTINUED | OUTPATIENT
Start: 2025-08-06 | End: 2025-08-12 | Stop reason: HOSPADM

## 2025-08-06 RX ORDER — LISINOPRIL 20 MG/1
20 TABLET ORAL DAILY
Status: DISCONTINUED | OUTPATIENT
Start: 2025-08-06 | End: 2025-08-12 | Stop reason: HOSPADM

## 2025-08-06 RX ORDER — POLYETHYLENE GLYCOL 3350 17 G/17G
17 POWDER, FOR SOLUTION ORAL DAILY
Status: DISCONTINUED | OUTPATIENT
Start: 2025-08-06 | End: 2025-08-12 | Stop reason: HOSPADM

## 2025-08-06 RX ORDER — OXYCODONE HYDROCHLORIDE 5 MG/1
5 TABLET ORAL EVERY 4 HOURS PRN
Status: DISCONTINUED | OUTPATIENT
Start: 2025-08-06 | End: 2025-08-07

## 2025-08-06 RX ORDER — NICOTINE 21 MG/24HR
1 PATCH, TRANSDERMAL 24 HOURS TRANSDERMAL DAILY
Status: DISCONTINUED | OUTPATIENT
Start: 2025-08-06 | End: 2025-08-12 | Stop reason: HOSPADM

## 2025-08-06 RX ORDER — MORPHINE SULFATE 15 MG/1
15 TABLET ORAL ONCE
Refills: 0 | Status: COMPLETED | OUTPATIENT
Start: 2025-08-06 | End: 2025-08-06

## 2025-08-06 RX ORDER — ASPIRIN 81 MG/1
81 TABLET, CHEWABLE ORAL DAILY
Status: DISCONTINUED | OUTPATIENT
Start: 2025-08-06 | End: 2025-08-07

## 2025-08-06 RX ORDER — OXYCODONE HYDROCHLORIDE 10 MG/1
10 TABLET ORAL EVERY 6 HOURS PRN
Status: DISCONTINUED | OUTPATIENT
Start: 2025-08-06 | End: 2025-08-07

## 2025-08-06 RX ORDER — POTASSIUM CHLORIDE 1500 MG/1
40 TABLET, EXTENDED RELEASE ORAL ONCE
Status: COMPLETED | OUTPATIENT
Start: 2025-08-06 | End: 2025-08-06

## 2025-08-06 RX ORDER — INSULIN LISPRO 100 [IU]/ML
1-6 INJECTION, SOLUTION INTRAVENOUS; SUBCUTANEOUS
Status: DISCONTINUED | OUTPATIENT
Start: 2025-08-06 | End: 2025-08-12 | Stop reason: HOSPADM

## 2025-08-06 RX ORDER — RIVAROXABAN 2.5 MG/1
2.5 TABLET, FILM COATED ORAL 2 TIMES DAILY
Status: DISCONTINUED | OUTPATIENT
Start: 2025-08-06 | End: 2025-08-12 | Stop reason: HOSPADM

## 2025-08-06 RX ORDER — INSULIN GLARGINE 100 [IU]/ML
50 INJECTION, SOLUTION SUBCUTANEOUS
Status: DISCONTINUED | OUTPATIENT
Start: 2025-08-06 | End: 2025-08-12 | Stop reason: HOSPADM

## 2025-08-06 RX ORDER — AMLODIPINE BESYLATE 5 MG/1
5 TABLET ORAL DAILY
Status: DISCONTINUED | OUTPATIENT
Start: 2025-08-06 | End: 2025-08-12 | Stop reason: HOSPADM

## 2025-08-06 RX ORDER — INSULIN LISPRO 100 [IU]/ML
15 INJECTION, SOLUTION INTRAVENOUS; SUBCUTANEOUS
Status: DISCONTINUED | OUTPATIENT
Start: 2025-08-06 | End: 2025-08-11

## 2025-08-06 RX ORDER — KETOTIFEN FUMARATE 0.35 MG/ML
1 SOLUTION/ DROPS OPHTHALMIC 2 TIMES DAILY PRN
Status: DISCONTINUED | OUTPATIENT
Start: 2025-08-06 | End: 2025-08-12 | Stop reason: HOSPADM

## 2025-08-06 RX ORDER — ACETAMINOPHEN 325 MG/1
975 TABLET ORAL EVERY 8 HOURS PRN
Status: DISCONTINUED | OUTPATIENT
Start: 2025-08-06 | End: 2025-08-12 | Stop reason: HOSPADM

## 2025-08-06 RX ORDER — FERROUS SULFATE 325(65) MG
325 TABLET ORAL
Status: DISCONTINUED | OUTPATIENT
Start: 2025-08-06 | End: 2025-08-12 | Stop reason: HOSPADM

## 2025-08-06 RX ORDER — FENTANYL CITRATE 50 UG/ML
50 INJECTION, SOLUTION INTRAMUSCULAR; INTRAVENOUS ONCE
Refills: 0 | Status: COMPLETED | OUTPATIENT
Start: 2025-08-06 | End: 2025-08-06

## 2025-08-06 RX ORDER — ARIPIPRAZOLE 5 MG/1
5 TABLET ORAL DAILY
Status: DISCONTINUED | OUTPATIENT
Start: 2025-08-06 | End: 2025-08-12 | Stop reason: HOSPADM

## 2025-08-06 RX ORDER — MUSCLE RUB CREAM 100; 150 MG/G; MG/G
CREAM TOPICAL 4 TIMES DAILY PRN
Status: DISCONTINUED | OUTPATIENT
Start: 2025-08-06 | End: 2025-08-12 | Stop reason: HOSPADM

## 2025-08-06 RX ORDER — ATORVASTATIN CALCIUM 40 MG/1
80 TABLET, FILM COATED ORAL
Status: DISCONTINUED | OUTPATIENT
Start: 2025-08-06 | End: 2025-08-12 | Stop reason: HOSPADM

## 2025-08-06 RX ORDER — ALBUTEROL SULFATE 90 UG/1
2 INHALANT RESPIRATORY (INHALATION) EVERY 4 HOURS PRN
Status: DISCONTINUED | OUTPATIENT
Start: 2025-08-06 | End: 2025-08-12 | Stop reason: HOSPADM

## 2025-08-06 RX ORDER — PRAZOSIN HYDROCHLORIDE 1 MG/1
1 CAPSULE ORAL
Status: DISCONTINUED | OUTPATIENT
Start: 2025-08-06 | End: 2025-08-12 | Stop reason: HOSPADM

## 2025-08-06 RX ORDER — METOPROLOL TARTRATE 25 MG/1
25 TABLET, FILM COATED ORAL EVERY 12 HOURS SCHEDULED
Status: DISCONTINUED | OUTPATIENT
Start: 2025-08-06 | End: 2025-08-12 | Stop reason: HOSPADM

## 2025-08-06 RX ORDER — ACETAMINOPHEN 10 MG/ML
1000 INJECTION, SOLUTION INTRAVENOUS ONCE
Status: COMPLETED | OUTPATIENT
Start: 2025-08-06 | End: 2025-08-06

## 2025-08-06 RX ADMIN — ATORVASTATIN CALCIUM 80 MG: 40 TABLET, FILM COATED ORAL at 17:40

## 2025-08-06 RX ADMIN — INSULIN LISPRO 15 UNITS: 100 INJECTION, SOLUTION INTRAVENOUS; SUBCUTANEOUS at 10:20

## 2025-08-06 RX ADMIN — POLYETHYLENE GLYCOL 3350 17 G: 17 POWDER, FOR SOLUTION ORAL at 10:19

## 2025-08-06 RX ADMIN — ASPIRIN 81 MG 81 MG: 81 TABLET ORAL at 10:20

## 2025-08-06 RX ADMIN — FENTANYL CITRATE 50 MCG: 50 INJECTION INTRAMUSCULAR; INTRAVENOUS at 03:24

## 2025-08-06 RX ADMIN — FLUTICASONE FUROATE AND VILANTEROL TRIFENATATE 1 PUFF: 100; 25 POWDER RESPIRATORY (INHALATION) at 10:20

## 2025-08-06 RX ADMIN — INSULIN LISPRO 15 UNITS: 100 INJECTION, SOLUTION INTRAVENOUS; SUBCUTANEOUS at 13:27

## 2025-08-06 RX ADMIN — INSULIN LISPRO 3 UNITS: 100 INJECTION, SOLUTION INTRAVENOUS; SUBCUTANEOUS at 22:12

## 2025-08-06 RX ADMIN — NICOTINE 1 PATCH: 21 PATCH, EXTENDED RELEASE TRANSDERMAL at 10:19

## 2025-08-06 RX ADMIN — INSULIN LISPRO 3 UNITS: 100 INJECTION, SOLUTION INTRAVENOUS; SUBCUTANEOUS at 13:27

## 2025-08-06 RX ADMIN — GABAPENTIN 600 MG: 300 CAPSULE ORAL at 22:12

## 2025-08-06 RX ADMIN — ACETAMINOPHEN 1000 MG: 10 INJECTION, SOLUTION INTRAVENOUS at 03:28

## 2025-08-06 RX ADMIN — INSULIN LISPRO 1 UNITS: 100 INJECTION, SOLUTION INTRAVENOUS; SUBCUTANEOUS at 10:21

## 2025-08-06 RX ADMIN — MORPHINE SULFATE 15 MG: 15 TABLET ORAL at 05:00

## 2025-08-06 RX ADMIN — RIVAROXABAN 2.5 MG: 2.5 TABLET, FILM COATED ORAL at 17:40

## 2025-08-06 RX ADMIN — RIVAROXABAN 2.5 MG: 2.5 TABLET, FILM COATED ORAL at 11:56

## 2025-08-06 RX ADMIN — KETOTIFEN FUMARATE 1 DROP: 0.25 SOLUTION/ DROPS OPHTHALMIC at 10:20

## 2025-08-06 RX ADMIN — IOHEXOL 100 ML: 350 INJECTION, SOLUTION INTRAVENOUS at 03:17

## 2025-08-06 RX ADMIN — FERROUS SULFATE TAB 325 MG (65 MG ELEMENTAL FE) 325 MG: 325 (65 FE) TAB at 10:20

## 2025-08-06 RX ADMIN — INSULIN GLARGINE 50 UNITS: 100 INJECTION, SOLUTION SUBCUTANEOUS at 22:32

## 2025-08-06 RX ADMIN — GABAPENTIN 600 MG: 300 CAPSULE ORAL at 17:40

## 2025-08-06 RX ADMIN — OXYCODONE HYDROCHLORIDE 10 MG: 10 TABLET ORAL at 10:23

## 2025-08-06 RX ADMIN — DULOXETINE HYDROCHLORIDE 60 MG: 60 CAPSULE, DELAYED RELEASE ORAL at 10:20

## 2025-08-06 RX ADMIN — GABAPENTIN 600 MG: 300 CAPSULE ORAL at 10:20

## 2025-08-06 RX ADMIN — ARIPIPRAZOLE 5 MG: 5 TABLET ORAL at 10:20

## 2025-08-06 RX ADMIN — POTASSIUM CHLORIDE 40 MEQ: 1500 TABLET, EXTENDED RELEASE ORAL at 10:20

## 2025-08-07 ENCOUNTER — VBI (OUTPATIENT)
Dept: ADMINISTRATIVE | Facility: OTHER | Age: 40
End: 2025-08-07

## 2025-08-07 PROBLEM — I63.9 CVA (CEREBRAL VASCULAR ACCIDENT) (HCC): Status: ACTIVE | Noted: 2025-07-13

## 2025-08-07 LAB
ANION GAP SERPL CALCULATED.3IONS-SCNC: 8 MMOL/L (ref 4–13)
BASOPHILS # BLD AUTO: 0.04 THOUSANDS/ÂΜL (ref 0–0.1)
BASOPHILS NFR BLD AUTO: 1 % (ref 0–1)
BUN SERPL-MCNC: 15 MG/DL (ref 5–25)
CALCIUM SERPL-MCNC: 8.5 MG/DL (ref 8.4–10.2)
CHLORIDE SERPL-SCNC: 104 MMOL/L (ref 96–108)
CHOLEST SERPL-MCNC: 181 MG/DL (ref ?–200)
CO2 SERPL-SCNC: 28 MMOL/L (ref 21–32)
CREAT SERPL-MCNC: 0.43 MG/DL (ref 0.6–1.3)
EOSINOPHIL # BLD AUTO: 0.3 THOUSAND/ÂΜL (ref 0–0.61)
EOSINOPHIL NFR BLD AUTO: 4 % (ref 0–6)
ERYTHROCYTE [DISTWIDTH] IN BLOOD BY AUTOMATED COUNT: 15.8 % (ref 11.6–15.1)
GFR SERPL CREATININE-BSD FRML MDRD: 129 ML/MIN/1.73SQ M
GLUCOSE SERPL-MCNC: 170 MG/DL (ref 65–140)
GLUCOSE SERPL-MCNC: 173 MG/DL (ref 65–140)
GLUCOSE SERPL-MCNC: 196 MG/DL (ref 65–140)
GLUCOSE SERPL-MCNC: 256 MG/DL (ref 65–140)
GLUCOSE SERPL-MCNC: 298 MG/DL (ref 65–140)
HCT VFR BLD AUTO: 30.5 % (ref 34.8–46.1)
HDLC SERPL-MCNC: 42 MG/DL
HGB BLD-MCNC: 10.4 G/DL (ref 11.5–15.4)
IMM GRANULOCYTES # BLD AUTO: 0.05 THOUSAND/UL (ref 0–0.2)
IMM GRANULOCYTES NFR BLD AUTO: 1 % (ref 0–2)
LDLC SERPL CALC-MCNC: 130 MG/DL (ref 0–100)
LYMPHOCYTES # BLD AUTO: 2.39 THOUSANDS/ÂΜL (ref 0.6–4.47)
LYMPHOCYTES NFR BLD AUTO: 31 % (ref 14–44)
MCH RBC QN AUTO: 25.8 PG (ref 26.8–34.3)
MCHC RBC AUTO-ENTMCNC: 34.1 G/DL (ref 31.4–37.4)
MCV RBC AUTO: 76 FL (ref 82–98)
MONOCYTES # BLD AUTO: 0.56 THOUSAND/ÂΜL (ref 0.17–1.22)
MONOCYTES NFR BLD AUTO: 7 % (ref 4–12)
NEUTROPHILS # BLD AUTO: 4.39 THOUSANDS/ÂΜL (ref 1.85–7.62)
NEUTS SEG NFR BLD AUTO: 56 % (ref 43–75)
NRBC BLD AUTO-RTO: 0 /100 WBCS
PLATELET # BLD AUTO: 444 THOUSANDS/UL (ref 149–390)
PMV BLD AUTO: 9.6 FL (ref 8.9–12.7)
POTASSIUM SERPL-SCNC: 3.5 MMOL/L (ref 3.5–5.3)
RBC # BLD AUTO: 4.03 MILLION/UL (ref 3.81–5.12)
SODIUM SERPL-SCNC: 140 MMOL/L (ref 135–147)
TRIGL SERPL-MCNC: 45 MG/DL (ref ?–150)
WBC # BLD AUTO: 7.73 THOUSAND/UL (ref 4.31–10.16)

## 2025-08-07 PROCEDURE — 99233 SBSQ HOSP IP/OBS HIGH 50: CPT | Performed by: PHYSICIAN ASSISTANT

## 2025-08-07 PROCEDURE — 85025 COMPLETE CBC W/AUTO DIFF WBC: CPT

## 2025-08-07 PROCEDURE — 97163 PT EVAL HIGH COMPLEX 45 MIN: CPT

## 2025-08-07 PROCEDURE — 97167 OT EVAL HIGH COMPLEX 60 MIN: CPT

## 2025-08-07 PROCEDURE — 80061 LIPID PANEL: CPT

## 2025-08-07 PROCEDURE — 80048 BASIC METABOLIC PNL TOTAL CA: CPT

## 2025-08-07 PROCEDURE — 82948 REAGENT STRIP/BLOOD GLUCOSE: CPT

## 2025-08-07 RX ORDER — OXYCODONE HYDROCHLORIDE 10 MG/1
10 TABLET ORAL EVERY 4 HOURS PRN
Refills: 0 | Status: DISCONTINUED | OUTPATIENT
Start: 2025-08-07 | End: 2025-08-12 | Stop reason: HOSPADM

## 2025-08-07 RX ORDER — LISINOPRIL 20 MG/1
20 TABLET ORAL DAILY
Qty: 30 TABLET | Refills: 5 | Status: ON HOLD | OUTPATIENT
Start: 2025-08-07

## 2025-08-07 RX ORDER — HYDROMORPHONE HCL/PF 1 MG/ML
0.5 SYRINGE (ML) INJECTION
Status: DISCONTINUED | OUTPATIENT
Start: 2025-08-07 | End: 2025-08-12 | Stop reason: HOSPADM

## 2025-08-07 RX ORDER — OXYCODONE HYDROCHLORIDE 5 MG/1
5 TABLET ORAL EVERY 6 HOURS PRN
Refills: 0 | Status: DISCONTINUED | OUTPATIENT
Start: 2025-08-07 | End: 2025-08-12 | Stop reason: HOSPADM

## 2025-08-07 RX ORDER — OXYCODONE HYDROCHLORIDE 10 MG/1
10 TABLET ORAL EVERY 6 HOURS PRN
Refills: 0 | Status: DISCONTINUED | OUTPATIENT
Start: 2025-08-07 | End: 2025-08-07

## 2025-08-07 RX ORDER — OXYCODONE HYDROCHLORIDE 5 MG/1
5 TABLET ORAL EVERY 6 HOURS PRN
Refills: 0 | Status: DISCONTINUED | OUTPATIENT
Start: 2025-08-07 | End: 2025-08-07

## 2025-08-07 RX ORDER — CLOPIDOGREL BISULFATE 75 MG/1
75 TABLET ORAL DAILY
Status: DISCONTINUED | OUTPATIENT
Start: 2025-08-07 | End: 2025-08-12 | Stop reason: HOSPADM

## 2025-08-07 RX ORDER — HYDROMORPHONE HCL/PF 1 MG/ML
0.5 SYRINGE (ML) INJECTION EVERY 4 HOURS PRN
Refills: 0 | Status: DISCONTINUED | OUTPATIENT
Start: 2025-08-07 | End: 2025-08-07

## 2025-08-07 RX ADMIN — GABAPENTIN 600 MG: 300 CAPSULE ORAL at 08:42

## 2025-08-07 RX ADMIN — GABAPENTIN 600 MG: 300 CAPSULE ORAL at 22:13

## 2025-08-07 RX ADMIN — INSULIN LISPRO 4 UNITS: 100 INJECTION, SOLUTION INTRAVENOUS; SUBCUTANEOUS at 22:11

## 2025-08-07 RX ADMIN — FERROUS SULFATE TAB 325 MG (65 MG ELEMENTAL FE) 325 MG: 325 (65 FE) TAB at 08:44

## 2025-08-07 RX ADMIN — HYDROMORPHONE HYDROCHLORIDE 0.5 MG: 1 INJECTION, SOLUTION INTRAMUSCULAR; INTRAVENOUS; SUBCUTANEOUS at 20:59

## 2025-08-07 RX ADMIN — RIVAROXABAN 2.5 MG: 2.5 TABLET, FILM COATED ORAL at 08:42

## 2025-08-07 RX ADMIN — INSULIN LISPRO 3 UNITS: 100 INJECTION, SOLUTION INTRAVENOUS; SUBCUTANEOUS at 18:05

## 2025-08-07 RX ADMIN — INSULIN LISPRO 15 UNITS: 100 INJECTION, SOLUTION INTRAVENOUS; SUBCUTANEOUS at 08:41

## 2025-08-07 RX ADMIN — INSULIN LISPRO 2 UNITS: 100 INJECTION, SOLUTION INTRAVENOUS; SUBCUTANEOUS at 12:07

## 2025-08-07 RX ADMIN — INSULIN GLARGINE 50 UNITS: 100 INJECTION, SOLUTION SUBCUTANEOUS at 22:34

## 2025-08-07 RX ADMIN — INSULIN LISPRO 15 UNITS: 100 INJECTION, SOLUTION INTRAVENOUS; SUBCUTANEOUS at 18:05

## 2025-08-07 RX ADMIN — OXYCODONE HYDROCHLORIDE 10 MG: 10 TABLET ORAL at 22:34

## 2025-08-07 RX ADMIN — ATORVASTATIN CALCIUM 80 MG: 40 TABLET, FILM COATED ORAL at 18:04

## 2025-08-07 RX ADMIN — RIVAROXABAN 2.5 MG: 2.5 TABLET, FILM COATED ORAL at 18:04

## 2025-08-07 RX ADMIN — INSULIN LISPRO 15 UNITS: 100 INJECTION, SOLUTION INTRAVENOUS; SUBCUTANEOUS at 12:07

## 2025-08-07 RX ADMIN — CLOPIDOGREL 75 MG: 75 TABLET ORAL at 14:33

## 2025-08-07 RX ADMIN — GABAPENTIN 600 MG: 300 CAPSULE ORAL at 18:04

## 2025-08-07 RX ADMIN — NICOTINE 1 PATCH: 21 PATCH, EXTENDED RELEASE TRANSDERMAL at 08:42

## 2025-08-07 RX ADMIN — ASPIRIN 81 MG 81 MG: 81 TABLET ORAL at 08:42

## 2025-08-07 RX ADMIN — INSULIN LISPRO 1 UNITS: 100 INJECTION, SOLUTION INTRAVENOUS; SUBCUTANEOUS at 08:41

## 2025-08-07 RX ADMIN — ARIPIPRAZOLE 5 MG: 5 TABLET ORAL at 08:42

## 2025-08-07 RX ADMIN — DULOXETINE HYDROCHLORIDE 60 MG: 60 CAPSULE, DELAYED RELEASE ORAL at 08:42

## 2025-08-07 RX ADMIN — OXYCODONE HYDROCHLORIDE 10 MG: 10 TABLET ORAL at 18:09

## 2025-08-08 LAB
ANION GAP SERPL CALCULATED.3IONS-SCNC: 8 MMOL/L (ref 4–13)
BUN SERPL-MCNC: 14 MG/DL (ref 5–25)
CALCIUM SERPL-MCNC: 8.4 MG/DL (ref 8.4–10.2)
CHLORIDE SERPL-SCNC: 103 MMOL/L (ref 96–108)
CO2 SERPL-SCNC: 26 MMOL/L (ref 21–32)
CREAT SERPL-MCNC: 0.53 MG/DL (ref 0.6–1.3)
ERYTHROCYTE [DISTWIDTH] IN BLOOD BY AUTOMATED COUNT: 16.1 % (ref 11.6–15.1)
GFR SERPL CREATININE-BSD FRML MDRD: 120 ML/MIN/1.73SQ M
GLUCOSE SERPL-MCNC: 156 MG/DL (ref 65–140)
GLUCOSE SERPL-MCNC: 255 MG/DL (ref 65–140)
GLUCOSE SERPL-MCNC: 284 MG/DL (ref 65–140)
GLUCOSE SERPL-MCNC: 290 MG/DL (ref 65–140)
GLUCOSE SERPL-MCNC: 303 MG/DL (ref 65–140)
HCT VFR BLD AUTO: 30.9 % (ref 34.8–46.1)
HGB BLD-MCNC: 10.4 G/DL (ref 11.5–15.4)
MCH RBC QN AUTO: 26 PG (ref 26.8–34.3)
MCHC RBC AUTO-ENTMCNC: 33.7 G/DL (ref 31.4–37.4)
MCV RBC AUTO: 77 FL (ref 82–98)
PLATELET # BLD AUTO: 435 THOUSANDS/UL (ref 149–390)
PMV BLD AUTO: 9.8 FL (ref 8.9–12.7)
POTASSIUM SERPL-SCNC: 4 MMOL/L (ref 3.5–5.3)
RBC # BLD AUTO: 4 MILLION/UL (ref 3.81–5.12)
SODIUM SERPL-SCNC: 137 MMOL/L (ref 135–147)
WBC # BLD AUTO: 8.81 THOUSAND/UL (ref 4.31–10.16)

## 2025-08-08 PROCEDURE — 97530 THERAPEUTIC ACTIVITIES: CPT

## 2025-08-08 PROCEDURE — 97110 THERAPEUTIC EXERCISES: CPT

## 2025-08-08 PROCEDURE — 99232 SBSQ HOSP IP/OBS MODERATE 35: CPT

## 2025-08-08 PROCEDURE — 80048 BASIC METABOLIC PNL TOTAL CA: CPT | Performed by: PHYSICIAN ASSISTANT

## 2025-08-08 PROCEDURE — 97112 NEUROMUSCULAR REEDUCATION: CPT

## 2025-08-08 PROCEDURE — 85027 COMPLETE CBC AUTOMATED: CPT | Performed by: PHYSICIAN ASSISTANT

## 2025-08-08 PROCEDURE — 82948 REAGENT STRIP/BLOOD GLUCOSE: CPT

## 2025-08-08 RX ADMIN — GABAPENTIN 600 MG: 300 CAPSULE ORAL at 20:48

## 2025-08-08 RX ADMIN — DULOXETINE HYDROCHLORIDE 60 MG: 60 CAPSULE, DELAYED RELEASE ORAL at 09:21

## 2025-08-08 RX ADMIN — OXYCODONE HYDROCHLORIDE 10 MG: 10 TABLET ORAL at 19:49

## 2025-08-08 RX ADMIN — OXYCODONE HYDROCHLORIDE 10 MG: 10 TABLET ORAL at 03:07

## 2025-08-08 RX ADMIN — GABAPENTIN 600 MG: 300 CAPSULE ORAL at 09:21

## 2025-08-08 RX ADMIN — RIVAROXABAN 2.5 MG: 2.5 TABLET, FILM COATED ORAL at 09:27

## 2025-08-08 RX ADMIN — ATORVASTATIN CALCIUM 80 MG: 40 TABLET, FILM COATED ORAL at 16:38

## 2025-08-08 RX ADMIN — INSULIN GLARGINE 50 UNITS: 100 INJECTION, SOLUTION SUBCUTANEOUS at 21:42

## 2025-08-08 RX ADMIN — ARIPIPRAZOLE 5 MG: 5 TABLET ORAL at 09:21

## 2025-08-08 RX ADMIN — HYDROMORPHONE HYDROCHLORIDE 0.5 MG: 1 INJECTION, SOLUTION INTRAMUSCULAR; INTRAVENOUS; SUBCUTANEOUS at 05:25

## 2025-08-08 RX ADMIN — FERROUS SULFATE TAB 325 MG (65 MG ELEMENTAL FE) 325 MG: 325 (65 FE) TAB at 09:27

## 2025-08-08 RX ADMIN — NICOTINE 1 PATCH: 21 PATCH, EXTENDED RELEASE TRANSDERMAL at 09:21

## 2025-08-08 RX ADMIN — CLOPIDOGREL 75 MG: 75 TABLET ORAL at 09:21

## 2025-08-08 RX ADMIN — INSULIN LISPRO 15 UNITS: 100 INJECTION, SOLUTION INTRAVENOUS; SUBCUTANEOUS at 09:21

## 2025-08-08 RX ADMIN — INSULIN LISPRO 4 UNITS: 100 INJECTION, SOLUTION INTRAVENOUS; SUBCUTANEOUS at 21:42

## 2025-08-08 RX ADMIN — INSULIN LISPRO 1 UNITS: 100 INJECTION, SOLUTION INTRAVENOUS; SUBCUTANEOUS at 16:38

## 2025-08-08 RX ADMIN — INSULIN LISPRO 15 UNITS: 100 INJECTION, SOLUTION INTRAVENOUS; SUBCUTANEOUS at 11:38

## 2025-08-08 RX ADMIN — INSULIN LISPRO 4 UNITS: 100 INJECTION, SOLUTION INTRAVENOUS; SUBCUTANEOUS at 09:21

## 2025-08-08 RX ADMIN — GABAPENTIN 600 MG: 300 CAPSULE ORAL at 15:20

## 2025-08-08 RX ADMIN — INSULIN LISPRO 15 UNITS: 100 INJECTION, SOLUTION INTRAVENOUS; SUBCUTANEOUS at 16:38

## 2025-08-08 RX ADMIN — OXYCODONE HYDROCHLORIDE 10 MG: 10 TABLET ORAL at 15:20

## 2025-08-08 RX ADMIN — HYDROMORPHONE HYDROCHLORIDE 0.5 MG: 1 INJECTION, SOLUTION INTRAMUSCULAR; INTRAVENOUS; SUBCUTANEOUS at 00:09

## 2025-08-08 RX ADMIN — RIVAROXABAN 2.5 MG: 2.5 TABLET, FILM COATED ORAL at 17:10

## 2025-08-08 RX ADMIN — HYDROMORPHONE HYDROCHLORIDE 0.5 MG: 1 INJECTION, SOLUTION INTRAMUSCULAR; INTRAVENOUS; SUBCUTANEOUS at 18:56

## 2025-08-08 RX ADMIN — INSULIN LISPRO 3 UNITS: 100 INJECTION, SOLUTION INTRAVENOUS; SUBCUTANEOUS at 11:38

## 2025-08-09 LAB
GLUCOSE SERPL-MCNC: 143 MG/DL (ref 65–140)
GLUCOSE SERPL-MCNC: 171 MG/DL (ref 65–140)
GLUCOSE SERPL-MCNC: 241 MG/DL (ref 65–140)
GLUCOSE SERPL-MCNC: 99 MG/DL (ref 65–140)

## 2025-08-09 PROCEDURE — 82948 REAGENT STRIP/BLOOD GLUCOSE: CPT

## 2025-08-09 PROCEDURE — 99232 SBSQ HOSP IP/OBS MODERATE 35: CPT | Performed by: PHYSICIAN ASSISTANT

## 2025-08-09 RX ADMIN — OXYCODONE HYDROCHLORIDE 10 MG: 10 TABLET ORAL at 19:38

## 2025-08-09 RX ADMIN — INSULIN LISPRO 15 UNITS: 100 INJECTION, SOLUTION INTRAVENOUS; SUBCUTANEOUS at 12:04

## 2025-08-09 RX ADMIN — INSULIN GLARGINE 50 UNITS: 100 INJECTION, SOLUTION SUBCUTANEOUS at 21:44

## 2025-08-09 RX ADMIN — RIVAROXABAN 2.5 MG: 2.5 TABLET, FILM COATED ORAL at 09:34

## 2025-08-09 RX ADMIN — GABAPENTIN 600 MG: 300 CAPSULE ORAL at 09:16

## 2025-08-09 RX ADMIN — NICOTINE 1 PATCH: 21 PATCH, EXTENDED RELEASE TRANSDERMAL at 09:16

## 2025-08-09 RX ADMIN — ARIPIPRAZOLE 5 MG: 5 TABLET ORAL at 09:16

## 2025-08-09 RX ADMIN — OXYCODONE HYDROCHLORIDE 10 MG: 10 TABLET ORAL at 06:07

## 2025-08-09 RX ADMIN — INSULIN LISPRO 15 UNITS: 100 INJECTION, SOLUTION INTRAVENOUS; SUBCUTANEOUS at 17:17

## 2025-08-09 RX ADMIN — GABAPENTIN 600 MG: 300 CAPSULE ORAL at 17:17

## 2025-08-09 RX ADMIN — INSULIN LISPRO 15 UNITS: 100 INJECTION, SOLUTION INTRAVENOUS; SUBCUTANEOUS at 07:39

## 2025-08-09 RX ADMIN — FERROUS SULFATE TAB 325 MG (65 MG ELEMENTAL FE) 325 MG: 325 (65 FE) TAB at 07:41

## 2025-08-09 RX ADMIN — HYDROMORPHONE HYDROCHLORIDE 0.5 MG: 1 INJECTION, SOLUTION INTRAMUSCULAR; INTRAVENOUS; SUBCUTANEOUS at 21:44

## 2025-08-09 RX ADMIN — RIVAROXABAN 2.5 MG: 2.5 TABLET, FILM COATED ORAL at 17:17

## 2025-08-09 RX ADMIN — GABAPENTIN 600 MG: 300 CAPSULE ORAL at 21:49

## 2025-08-09 RX ADMIN — DULOXETINE HYDROCHLORIDE 60 MG: 60 CAPSULE, DELAYED RELEASE ORAL at 09:16

## 2025-08-09 RX ADMIN — METOPROLOL TARTRATE 25 MG: 25 TABLET, FILM COATED ORAL at 20:31

## 2025-08-09 RX ADMIN — ATORVASTATIN CALCIUM 80 MG: 40 TABLET, FILM COATED ORAL at 17:17

## 2025-08-09 RX ADMIN — INSULIN LISPRO 1 UNITS: 100 INJECTION, SOLUTION INTRAVENOUS; SUBCUTANEOUS at 07:39

## 2025-08-09 RX ADMIN — HYDROMORPHONE HYDROCHLORIDE 0.5 MG: 1 INJECTION, SOLUTION INTRAMUSCULAR; INTRAVENOUS; SUBCUTANEOUS at 07:36

## 2025-08-09 RX ADMIN — HYDROMORPHONE HYDROCHLORIDE 0.5 MG: 1 INJECTION, SOLUTION INTRAMUSCULAR; INTRAVENOUS; SUBCUTANEOUS at 14:34

## 2025-08-09 RX ADMIN — OXYCODONE HYDROCHLORIDE 10 MG: 10 TABLET ORAL at 12:02

## 2025-08-09 RX ADMIN — CLOPIDOGREL 75 MG: 75 TABLET ORAL at 09:16

## 2025-08-09 RX ADMIN — PRAZOSIN HYDROCHLORIDE 1 MG: 1 CAPSULE ORAL at 21:44

## 2025-08-09 RX ADMIN — POLYETHYLENE GLYCOL 3350 17 G: 17 POWDER, FOR SOLUTION ORAL at 09:16

## 2025-08-09 RX ADMIN — OXYCODONE HYDROCHLORIDE 10 MG: 10 TABLET ORAL at 23:58

## 2025-08-09 RX ADMIN — INSULIN LISPRO 3 UNITS: 100 INJECTION, SOLUTION INTRAVENOUS; SUBCUTANEOUS at 21:44

## 2025-08-09 RX ADMIN — OXYCODONE HYDROCHLORIDE 10 MG: 10 TABLET ORAL at 02:21

## 2025-08-10 LAB
GLUCOSE SERPL-MCNC: 186 MG/DL (ref 65–140)
GLUCOSE SERPL-MCNC: 203 MG/DL (ref 65–140)
GLUCOSE SERPL-MCNC: 302 MG/DL (ref 65–140)
GLUCOSE SERPL-MCNC: 370 MG/DL (ref 65–140)

## 2025-08-10 PROCEDURE — 82948 REAGENT STRIP/BLOOD GLUCOSE: CPT

## 2025-08-10 PROCEDURE — 99232 SBSQ HOSP IP/OBS MODERATE 35: CPT | Performed by: PHYSICIAN ASSISTANT

## 2025-08-10 RX ADMIN — OXYCODONE HYDROCHLORIDE 10 MG: 10 TABLET ORAL at 05:52

## 2025-08-10 RX ADMIN — ARIPIPRAZOLE 5 MG: 5 TABLET ORAL at 08:57

## 2025-08-10 RX ADMIN — CLOPIDOGREL 75 MG: 75 TABLET ORAL at 08:57

## 2025-08-10 RX ADMIN — PRAZOSIN HYDROCHLORIDE 1 MG: 1 CAPSULE ORAL at 21:44

## 2025-08-10 RX ADMIN — INSULIN LISPRO 15 UNITS: 100 INJECTION, SOLUTION INTRAVENOUS; SUBCUTANEOUS at 16:52

## 2025-08-10 RX ADMIN — ATORVASTATIN CALCIUM 80 MG: 40 TABLET, FILM COATED ORAL at 16:52

## 2025-08-10 RX ADMIN — FERROUS SULFATE TAB 325 MG (65 MG ELEMENTAL FE) 325 MG: 325 (65 FE) TAB at 07:35

## 2025-08-10 RX ADMIN — INSULIN LISPRO 4 UNITS: 100 INJECTION, SOLUTION INTRAVENOUS; SUBCUTANEOUS at 21:49

## 2025-08-10 RX ADMIN — OXYCODONE HYDROCHLORIDE 10 MG: 10 TABLET ORAL at 23:01

## 2025-08-10 RX ADMIN — HYDROMORPHONE HYDROCHLORIDE 0.5 MG: 1 INJECTION, SOLUTION INTRAMUSCULAR; INTRAVENOUS; SUBCUTANEOUS at 07:39

## 2025-08-10 RX ADMIN — INSULIN LISPRO 6 UNITS: 100 INJECTION, SOLUTION INTRAVENOUS; SUBCUTANEOUS at 07:32

## 2025-08-10 RX ADMIN — RIVAROXABAN 2.5 MG: 2.5 TABLET, FILM COATED ORAL at 08:56

## 2025-08-10 RX ADMIN — INSULIN GLARGINE 50 UNITS: 100 INJECTION, SOLUTION SUBCUTANEOUS at 21:44

## 2025-08-10 RX ADMIN — METOPROLOL TARTRATE 25 MG: 25 TABLET, FILM COATED ORAL at 08:57

## 2025-08-10 RX ADMIN — INSULIN LISPRO 15 UNITS: 100 INJECTION, SOLUTION INTRAVENOUS; SUBCUTANEOUS at 12:11

## 2025-08-10 RX ADMIN — GABAPENTIN 600 MG: 300 CAPSULE ORAL at 16:52

## 2025-08-10 RX ADMIN — OXYCODONE HYDROCHLORIDE 10 MG: 10 TABLET ORAL at 12:18

## 2025-08-10 RX ADMIN — GABAPENTIN 600 MG: 300 CAPSULE ORAL at 21:44

## 2025-08-10 RX ADMIN — LISINOPRIL 20 MG: 20 TABLET ORAL at 08:57

## 2025-08-10 RX ADMIN — POLYETHYLENE GLYCOL 3350 17 G: 17 POWDER, FOR SOLUTION ORAL at 08:56

## 2025-08-10 RX ADMIN — DULOXETINE HYDROCHLORIDE 60 MG: 60 CAPSULE, DELAYED RELEASE ORAL at 08:56

## 2025-08-10 RX ADMIN — RIVAROXABAN 2.5 MG: 2.5 TABLET, FILM COATED ORAL at 17:01

## 2025-08-10 RX ADMIN — AMLODIPINE BESYLATE 5 MG: 5 TABLET ORAL at 08:56

## 2025-08-10 RX ADMIN — HYDROMORPHONE HYDROCHLORIDE 0.5 MG: 1 INJECTION, SOLUTION INTRAMUSCULAR; INTRAVENOUS; SUBCUTANEOUS at 15:16

## 2025-08-10 RX ADMIN — NICOTINE 1 PATCH: 21 PATCH, EXTENDED RELEASE TRANSDERMAL at 08:58

## 2025-08-10 RX ADMIN — INSULIN LISPRO 2 UNITS: 100 INJECTION, SOLUTION INTRAVENOUS; SUBCUTANEOUS at 16:53

## 2025-08-10 RX ADMIN — INSULIN LISPRO 1 UNITS: 100 INJECTION, SOLUTION INTRAVENOUS; SUBCUTANEOUS at 12:11

## 2025-08-10 RX ADMIN — INSULIN LISPRO 15 UNITS: 100 INJECTION, SOLUTION INTRAVENOUS; SUBCUTANEOUS at 07:34

## 2025-08-10 RX ADMIN — GABAPENTIN 600 MG: 300 CAPSULE ORAL at 08:57

## 2025-08-10 RX ADMIN — METOPROLOL TARTRATE 25 MG: 25 TABLET, FILM COATED ORAL at 21:44

## 2025-08-11 LAB
GLUCOSE SERPL-MCNC: 238 MG/DL (ref 65–140)
GLUCOSE SERPL-MCNC: 253 MG/DL (ref 65–140)
GLUCOSE SERPL-MCNC: 258 MG/DL (ref 65–140)
GLUCOSE SERPL-MCNC: 280 MG/DL (ref 65–140)

## 2025-08-11 PROCEDURE — 82948 REAGENT STRIP/BLOOD GLUCOSE: CPT

## 2025-08-11 PROCEDURE — 99239 HOSP IP/OBS DSCHRG MGMT >30: CPT

## 2025-08-11 PROCEDURE — 99232 SBSQ HOSP IP/OBS MODERATE 35: CPT | Performed by: PHYSICIAN ASSISTANT

## 2025-08-11 PROCEDURE — 97112 NEUROMUSCULAR REEDUCATION: CPT

## 2025-08-11 PROCEDURE — 97110 THERAPEUTIC EXERCISES: CPT

## 2025-08-11 PROCEDURE — 97530 THERAPEUTIC ACTIVITIES: CPT

## 2025-08-11 RX ORDER — INSULIN LISPRO 100 [IU]/ML
3 INJECTION, SOLUTION INTRAVENOUS; SUBCUTANEOUS ONCE
Status: COMPLETED | OUTPATIENT
Start: 2025-08-11 | End: 2025-08-11

## 2025-08-11 RX ORDER — INSULIN LISPRO 100 [IU]/ML
18 INJECTION, SOLUTION INTRAVENOUS; SUBCUTANEOUS
Status: DISCONTINUED | OUTPATIENT
Start: 2025-08-11 | End: 2025-08-12 | Stop reason: HOSPADM

## 2025-08-11 RX ADMIN — GABAPENTIN 600 MG: 300 CAPSULE ORAL at 17:02

## 2025-08-11 RX ADMIN — GABAPENTIN 600 MG: 300 CAPSULE ORAL at 21:36

## 2025-08-11 RX ADMIN — GABAPENTIN 600 MG: 300 CAPSULE ORAL at 08:52

## 2025-08-11 RX ADMIN — INSULIN LISPRO 3 UNITS: 100 INJECTION, SOLUTION INTRAVENOUS; SUBCUTANEOUS at 12:25

## 2025-08-11 RX ADMIN — ARIPIPRAZOLE 5 MG: 5 TABLET ORAL at 08:53

## 2025-08-11 RX ADMIN — INSULIN GLARGINE 50 UNITS: 100 INJECTION, SOLUTION SUBCUTANEOUS at 21:36

## 2025-08-11 RX ADMIN — HYDROMORPHONE HYDROCHLORIDE 0.5 MG: 1 INJECTION, SOLUTION INTRAMUSCULAR; INTRAVENOUS; SUBCUTANEOUS at 03:06

## 2025-08-11 RX ADMIN — LISINOPRIL 20 MG: 20 TABLET ORAL at 08:53

## 2025-08-11 RX ADMIN — AMLODIPINE BESYLATE 5 MG: 5 TABLET ORAL at 08:53

## 2025-08-11 RX ADMIN — FERROUS SULFATE TAB 325 MG (65 MG ELEMENTAL FE) 325 MG: 325 (65 FE) TAB at 08:53

## 2025-08-11 RX ADMIN — INSULIN LISPRO 3 UNITS: 100 INJECTION, SOLUTION INTRAVENOUS; SUBCUTANEOUS at 08:52

## 2025-08-11 RX ADMIN — NICOTINE 1 PATCH: 21 PATCH, EXTENDED RELEASE TRANSDERMAL at 08:55

## 2025-08-11 RX ADMIN — INSULIN LISPRO 3 UNITS: 100 INJECTION, SOLUTION INTRAVENOUS; SUBCUTANEOUS at 13:27

## 2025-08-11 RX ADMIN — PRAZOSIN HYDROCHLORIDE 1 MG: 1 CAPSULE ORAL at 21:36

## 2025-08-11 RX ADMIN — INSULIN LISPRO 18 UNITS: 100 INJECTION, SOLUTION INTRAVENOUS; SUBCUTANEOUS at 17:02

## 2025-08-11 RX ADMIN — INSULIN LISPRO 4 UNITS: 100 INJECTION, SOLUTION INTRAVENOUS; SUBCUTANEOUS at 17:02

## 2025-08-11 RX ADMIN — INSULIN LISPRO 3 UNITS: 100 INJECTION, SOLUTION INTRAVENOUS; SUBCUTANEOUS at 21:35

## 2025-08-11 RX ADMIN — METOPROLOL TARTRATE 25 MG: 25 TABLET, FILM COATED ORAL at 21:36

## 2025-08-11 RX ADMIN — RIVAROXABAN 2.5 MG: 2.5 TABLET, FILM COATED ORAL at 08:52

## 2025-08-11 RX ADMIN — METOPROLOL TARTRATE 25 MG: 25 TABLET, FILM COATED ORAL at 08:53

## 2025-08-11 RX ADMIN — HYDROMORPHONE HYDROCHLORIDE 0.5 MG: 1 INJECTION, SOLUTION INTRAMUSCULAR; INTRAVENOUS; SUBCUTANEOUS at 19:16

## 2025-08-11 RX ADMIN — INSULIN LISPRO 15 UNITS: 100 INJECTION, SOLUTION INTRAVENOUS; SUBCUTANEOUS at 08:54

## 2025-08-11 RX ADMIN — OXYCODONE HYDROCHLORIDE 10 MG: 10 TABLET ORAL at 13:25

## 2025-08-11 RX ADMIN — DULOXETINE HYDROCHLORIDE 60 MG: 60 CAPSULE, DELAYED RELEASE ORAL at 08:53

## 2025-08-11 RX ADMIN — CLOPIDOGREL 75 MG: 75 TABLET ORAL at 08:53

## 2025-08-11 RX ADMIN — RIVAROXABAN 2.5 MG: 2.5 TABLET, FILM COATED ORAL at 17:02

## 2025-08-11 RX ADMIN — ATORVASTATIN CALCIUM 80 MG: 40 TABLET, FILM COATED ORAL at 17:02

## 2025-08-11 RX ADMIN — OXYCODONE HYDROCHLORIDE 10 MG: 10 TABLET ORAL at 17:27

## 2025-08-12 VITALS
BODY MASS INDEX: 44.29 KG/M2 | HEART RATE: 91 BPM | SYSTOLIC BLOOD PRESSURE: 101 MMHG | WEIGHT: 234.57 LBS | OXYGEN SATURATION: 95 % | DIASTOLIC BLOOD PRESSURE: 45 MMHG | RESPIRATION RATE: 18 BRPM | HEIGHT: 61 IN | TEMPERATURE: 97.8 F

## 2025-08-12 LAB
GLUCOSE SERPL-MCNC: 119 MG/DL (ref 65–140)
GLUCOSE SERPL-MCNC: 143 MG/DL (ref 65–140)
GLUCOSE SERPL-MCNC: 299 MG/DL (ref 65–140)

## 2025-08-12 PROCEDURE — 82948 REAGENT STRIP/BLOOD GLUCOSE: CPT

## 2025-08-12 PROCEDURE — NC001 PR NO CHARGE

## 2025-08-12 RX ORDER — CLOPIDOGREL BISULFATE 75 MG/1
75 TABLET ORAL DAILY
Status: ON HOLD
Start: 2025-08-13

## 2025-08-12 RX ADMIN — POLYETHYLENE GLYCOL 3350 17 G: 17 POWDER, FOR SOLUTION ORAL at 09:21

## 2025-08-12 RX ADMIN — OXYCODONE HYDROCHLORIDE 10 MG: 10 TABLET ORAL at 11:16

## 2025-08-12 RX ADMIN — OXYCODONE HYDROCHLORIDE 10 MG: 10 TABLET ORAL at 04:25

## 2025-08-12 RX ADMIN — HYDROMORPHONE HYDROCHLORIDE 0.5 MG: 1 INJECTION, SOLUTION INTRAMUSCULAR; INTRAVENOUS; SUBCUTANEOUS at 14:15

## 2025-08-12 RX ADMIN — LISINOPRIL 20 MG: 20 TABLET ORAL at 09:21

## 2025-08-12 RX ADMIN — METOPROLOL TARTRATE 25 MG: 25 TABLET, FILM COATED ORAL at 09:21

## 2025-08-12 RX ADMIN — ATORVASTATIN CALCIUM 80 MG: 40 TABLET, FILM COATED ORAL at 16:16

## 2025-08-12 RX ADMIN — DULOXETINE HYDROCHLORIDE 60 MG: 60 CAPSULE, DELAYED RELEASE ORAL at 09:21

## 2025-08-12 RX ADMIN — FERROUS SULFATE TAB 325 MG (65 MG ELEMENTAL FE) 325 MG: 325 (65 FE) TAB at 09:23

## 2025-08-12 RX ADMIN — HYDROMORPHONE HYDROCHLORIDE 0.5 MG: 1 INJECTION, SOLUTION INTRAMUSCULAR; INTRAVENOUS; SUBCUTANEOUS at 00:47

## 2025-08-12 RX ADMIN — INSULIN LISPRO 18 UNITS: 100 INJECTION, SOLUTION INTRAVENOUS; SUBCUTANEOUS at 13:21

## 2025-08-12 RX ADMIN — RIVAROXABAN 2.5 MG: 2.5 TABLET, FILM COATED ORAL at 17:01

## 2025-08-12 RX ADMIN — AMLODIPINE BESYLATE 5 MG: 5 TABLET ORAL at 09:21

## 2025-08-12 RX ADMIN — INSULIN LISPRO 18 UNITS: 100 INJECTION, SOLUTION INTRAVENOUS; SUBCUTANEOUS at 16:58

## 2025-08-12 RX ADMIN — INSULIN LISPRO 18 UNITS: 100 INJECTION, SOLUTION INTRAVENOUS; SUBCUTANEOUS at 09:20

## 2025-08-12 RX ADMIN — INSULIN LISPRO 4 UNITS: 100 INJECTION, SOLUTION INTRAVENOUS; SUBCUTANEOUS at 09:20

## 2025-08-12 RX ADMIN — ARIPIPRAZOLE 5 MG: 5 TABLET ORAL at 09:21

## 2025-08-12 RX ADMIN — NICOTINE 1 PATCH: 21 PATCH, EXTENDED RELEASE TRANSDERMAL at 09:21

## 2025-08-12 RX ADMIN — CLOPIDOGREL 75 MG: 75 TABLET ORAL at 09:21

## 2025-08-12 RX ADMIN — GABAPENTIN 600 MG: 300 CAPSULE ORAL at 09:21

## 2025-08-12 RX ADMIN — RIVAROXABAN 2.5 MG: 2.5 TABLET, FILM COATED ORAL at 09:21

## 2025-08-12 RX ADMIN — GABAPENTIN 600 MG: 300 CAPSULE ORAL at 16:16

## 2025-08-15 ENCOUNTER — TRANSITIONAL CARE MANAGEMENT (OUTPATIENT)
Dept: FAMILY MEDICINE CLINIC | Facility: CLINIC | Age: 40
End: 2025-08-15

## 2025-08-16 PROBLEM — T81.30XA WOUND DEHISCENCE: Status: ACTIVE | Noted: 2021-10-28

## 2025-08-18 PROBLEM — Z87.898 HISTORY OF SUBSTANCE USE: Status: RESOLVED | Noted: 2025-05-26 | Resolved: 2025-08-18

## 2025-08-18 PROBLEM — F19.90 SUBSTANCE USE DISORDER: Status: ACTIVE | Noted: 2025-07-13

## 2025-08-18 PROBLEM — I82.431 ACUTE DEEP VEIN THROMBOSIS (DVT) OF RIGHT POPLITEAL VEIN (HCC): Status: ACTIVE | Noted: 2025-08-18

## 2025-08-19 DIAGNOSIS — E11.42 TYPE 2 DIABETES MELLITUS WITH DIABETIC POLYNEUROPATHY, WITH LONG-TERM CURRENT USE OF INSULIN (HCC): ICD-10-CM

## 2025-08-19 DIAGNOSIS — Z79.4 TYPE 2 DIABETES MELLITUS WITH DIABETIC POLYNEUROPATHY, WITH LONG-TERM CURRENT USE OF INSULIN (HCC): ICD-10-CM

## 2025-08-19 DIAGNOSIS — M79.604 PAIN OF RIGHT LOWER EXTREMITY: ICD-10-CM

## 2025-08-19 DIAGNOSIS — F31.75 BIPOLAR DISORDER, IN PARTIAL REMISSION, MOST RECENT EPISODE DEPRESSED (HCC): ICD-10-CM

## 2025-08-19 DIAGNOSIS — I10 ESSENTIAL HYPERTENSION: ICD-10-CM

## 2025-08-19 RX ORDER — METOPROLOL TARTRATE 25 MG/1
25 TABLET, FILM COATED ORAL 2 TIMES DAILY
Qty: 60 TABLET | Refills: 0 | OUTPATIENT
Start: 2025-08-19

## 2025-08-19 RX ORDER — ARIPIPRAZOLE 5 MG/1
5 TABLET ORAL DAILY
Qty: 30 TABLET | Refills: 0 | OUTPATIENT
Start: 2025-08-19

## 2025-08-19 RX ORDER — GABAPENTIN 300 MG/1
600 CAPSULE ORAL 3 TIMES DAILY
Qty: 180 CAPSULE | Refills: 0 | OUTPATIENT
Start: 2025-08-19

## 2025-08-19 RX ORDER — INSULIN LISPRO 100 [IU]/ML
20 INJECTION, SOLUTION INTRAVENOUS; SUBCUTANEOUS
Qty: 18 ML | Refills: 0 | OUTPATIENT
Start: 2025-08-19

## 2025-08-19 RX ORDER — AMLODIPINE BESYLATE 5 MG/1
5 TABLET ORAL DAILY
Qty: 30 TABLET | Refills: 0 | OUTPATIENT
Start: 2025-08-19

## 2025-08-25 PROBLEM — M79.604 LEG PAIN, ANTERIOR, RIGHT: Status: ACTIVE | Noted: 2025-08-25

## 2025-08-27 PROBLEM — T82.898A OCCLUSION OF BYPASS GRAFT (HCC): Status: ACTIVE | Noted: 2025-08-18

## 2025-08-27 PROBLEM — I82.401 DEEP VEIN THROMBOSIS (DVT) OF RIGHT LOWER EXTREMITY (HCC): Status: ACTIVE | Noted: 2025-08-18

## 2025-08-27 PROBLEM — T82.898A OCCLUSION OF BYPASS GRAFT (HCC): Status: ACTIVE | Noted: 2025-08-27

## (undated) DEVICE — 4F TEMPO AQUA .038 INCHES 125CM VERT: Brand: TEMPO AQUA

## (undated) DEVICE — OCCLUSIVE GAUZE STRIP,3% BISMUTH TRIBROMOPHENATE IN PETROLATUM BLEND: Brand: XEROFORM

## (undated) DEVICE — SUT PROLENE 5-0 C-1/C-1 36 IN 8720H

## (undated) DEVICE — Device

## (undated) DEVICE — SUT PROLENE 4-0 RB-1/RB-1 36 IN 8557H

## (undated) DEVICE — BULB SYRINGE,IRRIGATION WITH PROTECTIVE CAP: Brand: DOVER

## (undated) DEVICE — SUT PROLENE 7-0 BV175-6 24 IN M8737

## (undated) DEVICE — 1840 FOAM BLOCK NEEDLE COUNTER: Brand: DEVON

## (undated) DEVICE — GLOVE SRG BIOGEL ECLIPSE 7.5

## (undated) DEVICE — CHLORAPREP HI-LITE 26ML ORANGE

## (undated) DEVICE — ELECTRODE BLADE E-Z CLEAN 4IN -0014A

## (undated) DEVICE — ACE WRAP 6 IN STERILE

## (undated) DEVICE — VESSEL LOOPS X-RAY DETECTABLE: Brand: DEROYAL

## (undated) DEVICE — MEDI-VAC YANK SUCT HNDL W/TPRD BULBOUS TIP: Brand: CARDINAL HEALTH

## (undated) DEVICE — SUT VICRYL 2-0 SH 27 IN UNDYED J417H

## (undated) DEVICE — SUT SILK 2-0 SH CR/8 18 IN C012D

## (undated) DEVICE — GAUZE SPONGES,16 PLY: Brand: CURITY

## (undated) DEVICE — PACK UNIVERSAL DRAPES SUB-Q ICD

## (undated) DEVICE — Device: Brand: MEDEX

## (undated) DEVICE — ANTIBACTERIAL UNDYED BRAIDED (POLYGLACTIN 910), SYNTHETIC ABSORBABLE SUTURE: Brand: COATED VICRYL

## (undated) DEVICE — MICROPUNCTURE INTRODUCER SET SILHOUETTE TRANSITIONLESS PUSH-PLUS DESIGN - STIFFENED CANNULA WITH NITINOL WIRE GUIDE: Brand: MICROPUNCTURE

## (undated) DEVICE — PAD GROUNDING ADULT

## (undated) DEVICE — IV CATH INTROCAN 18G X 1 1/4 SAFETY

## (undated) DEVICE — PETRI DISH STERILE

## (undated) DEVICE — SYRINGE 10ML LL

## (undated) DEVICE — GLOVE SRG BIOGEL 6

## (undated) DEVICE — RADIFOCUS TORQUE DEVICE MULTI-TORQUE VISE: Brand: RADIFOCUS TORQUE DEVICE

## (undated) DEVICE — IV BUTTERFLY 21G SAFETY

## (undated) DEVICE — VALVULOTOME EZ

## (undated) DEVICE — SUT SILK 3-0 18 IN A184H

## (undated) DEVICE — DRESSING MEPILEX AG BORDER 4 X 4 IN

## (undated) DEVICE — RADIFOCUS GLIDEWIRE: Brand: GLIDEWIRE

## (undated) DEVICE — UNIVERSAL MAJOR EXTREMITY,KIT: Brand: CARDINAL HEALTH

## (undated) DEVICE — HEMOSTATIC MATRIX SURGIFLO 8ML W/THROMBIN

## (undated) DEVICE — 2108 SERIES SAGITTAL BLADE (18.6 X 0.64 X 61.1MM)

## (undated) DEVICE — 3M™ IOBAN™ 2 ANTIMICROBIAL INCISE DRAPE 6640EZ: Brand: IOBAN™ 2

## (undated) DEVICE — DRAPE C-ARM X-RAY

## (undated) DEVICE — MICRO KITS 5FR W/10CM

## (undated) DEVICE — CATH DIAG 5FR 0.035IN 65CM BS RBI SIDE PORTS

## (undated) DEVICE — GLOVE INDICATOR PI UNDERGLOVE SZ 6.5 BLUE

## (undated) DEVICE — ADHESIVE SKIN HIGH VISCOSITY EXOFIN 1ML

## (undated) DEVICE — 1200CC GUARDIAN II: Brand: GUARDIAN

## (undated) DEVICE — SUT SILK 2-0 18 IN A185H

## (undated) DEVICE — SURGICEL FIBRILLAR 1 X 2

## (undated) DEVICE — PENCIL ELECTROSURG E-Z CLEAN -0035H

## (undated) DEVICE — NEEDLE HYPO 2G X 1 IN

## (undated) DEVICE — PACK CUSTOM FEM POP RF

## (undated) DEVICE — ANTIBACTERIAL VIOLET BRAIDED (POLYGLACTIN 910), SYNTHETIC ABSORBABLE SUTURE: Brand: COATED VICRYL

## (undated) DEVICE — SUT PROLENE 6-0 BV-1/BV-1 24 IN 8305H

## (undated) DEVICE — STOCKINETTE REGULAR

## (undated) DEVICE — INTENDED FOR TISSUE SEPARATION, AND OTHER PROCEDURES THAT REQUIRE A SHARP SURGICAL BLADE TO PUNCTURE OR CUT.: Brand: BARD-PARKER SAFETY BLADES SIZE 10, STERILE

## (undated) DEVICE — SUT SILK 4-0 18 IN A183H

## (undated) DEVICE — SUT SILK 3-0 SH 30 IN K832H

## (undated) DEVICE — DRESSING MEPILEX AG BORDER 4 X 8 IN

## (undated) DEVICE — NON-DEHP HIGH FLOW RATE EXTENSION SET, MALE LUER LOCK ADAPTER

## (undated) DEVICE — STERILE ICS CARDIOVASCULAR PK: Brand: CARDINAL HEALTH

## (undated) DEVICE — SMALL (YELLOW) FOR GRAFTS UP TO 6 MM, 12" / 30.5 CM (1/PKG): Brand: SCANLAN® VASCULAR TUNNELER SHEATHS AND BULLET TIPS FULL CURVE

## (undated) DEVICE — 40601 PROLONGED POSITIONING SYSTEM: Brand: 40601 PROLONGED POSITIONING SYSTEM

## (undated) DEVICE — SUT VICRYL 0 CT-1 27 IN J260H

## (undated) DEVICE — DRAPE SHEET THREE QUARTER

## (undated) DEVICE — INTENDED FOR TISSUE SEPARATION, AND OTHER PROCEDURES THAT REQUIRE A SHARP SURGICAL BLADE TO PUNCTURE OR CUT.: Brand: BARD-PARKER ® CARBON RIB-BACK BLADES

## (undated) DEVICE — STOPCOCK 3-WAY

## (undated) DEVICE — FLUID MANAGEMENT KIT - IR

## (undated) DEVICE — STERILE BETHLEHEM FEM POP PACK: Brand: CARDINAL HEALTH

## (undated) DEVICE — CATH DIAG 5FR .035 65CM 6S OMMI-FLUSH

## (undated) DEVICE — DRESSING MEPILEX BORDER 4 X 12 IN

## (undated) DEVICE — SUT PROLENE 6-0 BV-1/BV-1 24 IN 8805H

## (undated) DEVICE — SPONGE STICK WITH PVP-I: Brand: KENDALL

## (undated) DEVICE — SPECIMEN CONTAINER STERILE PEEL PACK

## (undated) DEVICE — LIGACLIP MCA MULTIPLE CLIP APPLIERS, 20 SMALL CLIPS: Brand: LIGACLIP

## (undated) DEVICE — BETADINE SURGICAL SCRUB 32OZ

## (undated) DEVICE — SPONGE LAP 18 X 18 IN STRL RFD

## (undated) DEVICE — SUT MONOCRYL 3-0 SH 27 IN Y416H

## (undated) DEVICE — IV EXTENSION TUBING 33 IN

## (undated) DEVICE — SUT PROLENE 5-0 BV-1 24 IN 9702H

## (undated) DEVICE — 3000CC GUARDIAN II: Brand: GUARDIAN

## (undated) DEVICE — PROXIMATE PLUS MD MULTI-DIRECTIONAL RELEASE SKIN STAPLERS CONTAINS 35 STAINLESS STEEL STAPLES APPROXIMATE CLOSED DIMENSIONS: 6.9MM X 3.9MM WIDE: Brand: PROXIMATE

## (undated) DEVICE — KERLIX BANDAGE ROLL: Brand: KERLIX

## (undated) DEVICE — SUT MONOCRYL 4-0 PS-2 18 IN Y496G

## (undated) DEVICE — BAG DECANTER

## (undated) DEVICE — DRESSING XEROFORM 5 X 9

## (undated) DEVICE — SPONGE 4 X 4 XRAY 16 PLY STRL LF RFD

## (undated) DEVICE — LIGACLIP MCA MULTIPLE CLIP APPLIERS, 20 MEDIUM CLIPS: Brand: LIGACLIP

## (undated) DEVICE — SPONGE LAP 18 X 18 IN

## (undated) DEVICE — SUT VICRYL 0 REEL 54 IN J287G

## (undated) DEVICE — SUT ETHILON 3-0 FSLX 30 IN 1673H

## (undated) DEVICE — 1/2 FORCE SURGICAL SPRING CLIP: Brand: STEALTH® SPRING CLIP

## (undated) DEVICE — GLOVE INDICATOR PI UNDERGLOVE SZ 8 BLUE

## (undated) DEVICE — COVER PROBE INTRAOPERATIVE 6 X 96 IN

## (undated) DEVICE — TRAY FOLEY 16FR URIMETER SURESTEP

## (undated) DEVICE — SUT SILK 3-0 SH CR/8 18 IN C013D

## (undated) DEVICE — DESTINATION PERIPHERAL GUIDING SHEATH: Brand: DESTINATION

## (undated) DEVICE — INTENDED FOR TISSUE SEPARATION, AND OTHER PROCEDURES THAT REQUIRE A SHARP SURGICAL BLADE TO PUNCTURE OR CUT.: Brand: BARD-PARKER SAFETY BLADES SIZE 15, STERILE

## (undated) DEVICE — SUT MONOCRYL 2-0 CT-1 27 IN Y339H

## (undated) DEVICE — PINNACLE R/O II INTRODUCER SHEATH WITH RADIOPAQUE MARKER: Brand: PINNACLE

## (undated) DEVICE — SURGIFOAM 8.5 X 12.5